# Patient Record
Sex: FEMALE | Race: BLACK OR AFRICAN AMERICAN | NOT HISPANIC OR LATINO | Employment: OTHER | URBAN - METROPOLITAN AREA
[De-identification: names, ages, dates, MRNs, and addresses within clinical notes are randomized per-mention and may not be internally consistent; named-entity substitution may affect disease eponyms.]

---

## 2017-01-03 ENCOUNTER — OFFICE VISIT (OUTPATIENT)
Dept: INTERNAL MEDICINE | Facility: CLINIC | Age: 67
End: 2017-01-03
Payer: MEDICARE

## 2017-01-03 VITALS
HEIGHT: 65 IN | TEMPERATURE: 98 F | WEIGHT: 206.44 LBS | SYSTOLIC BLOOD PRESSURE: 115 MMHG | DIASTOLIC BLOOD PRESSURE: 70 MMHG | HEART RATE: 92 BPM | OXYGEN SATURATION: 96 % | BODY MASS INDEX: 34.39 KG/M2

## 2017-01-03 DIAGNOSIS — M71.50 TRAUMATIC BURSITIS: Primary | ICD-10-CM

## 2017-01-03 PROBLEM — S88.119A: Status: ACTIVE | Noted: 2017-01-03

## 2017-01-03 PROCEDURE — 1125F AMNT PAIN NOTED PAIN PRSNT: CPT | Mod: S$GLB,,, | Performed by: FAMILY MEDICINE

## 2017-01-03 PROCEDURE — 3078F DIAST BP <80 MM HG: CPT | Mod: S$GLB,,, | Performed by: FAMILY MEDICINE

## 2017-01-03 PROCEDURE — 99999 PR PBB SHADOW E&M-EST. PATIENT-LVL III: CPT | Mod: PBBFAC,,, | Performed by: FAMILY MEDICINE

## 2017-01-03 PROCEDURE — 1157F ADVNC CARE PLAN IN RCRD: CPT | Mod: S$GLB,,, | Performed by: FAMILY MEDICINE

## 2017-01-03 PROCEDURE — 1159F MED LIST DOCD IN RCRD: CPT | Mod: S$GLB,,, | Performed by: FAMILY MEDICINE

## 2017-01-03 PROCEDURE — 20610 DRAIN/INJ JOINT/BURSA W/O US: CPT | Mod: S$GLB,,, | Performed by: FAMILY MEDICINE

## 2017-01-03 PROCEDURE — 1160F RVW MEDS BY RX/DR IN RCRD: CPT | Mod: S$GLB,,, | Performed by: FAMILY MEDICINE

## 2017-01-03 PROCEDURE — 99213 OFFICE O/P EST LOW 20 MIN: CPT | Mod: 25,S$GLB,, | Performed by: FAMILY MEDICINE

## 2017-01-03 PROCEDURE — 3074F SYST BP LT 130 MM HG: CPT | Mod: S$GLB,,, | Performed by: FAMILY MEDICINE

## 2017-01-03 RX ORDER — HYDROCODONE BITARTRATE AND ACETAMINOPHEN 7.5; 325 MG/1; MG/1
1 TABLET ORAL DAILY PRN
Qty: 15 TABLET | Refills: 0 | Status: SHIPPED | OUTPATIENT
Start: 2017-01-03 | End: 2017-01-18 | Stop reason: SDUPTHER

## 2017-01-03 NOTE — PROGRESS NOTES
Subjective:       Patient ID: Cassandra Campos is a 66 y.o. female.    Chief Complaint: Joint Swelling (right knee swollen)    HPI  Review of Systems    Objective:      Physical Exam    Assessment:       No diagnosis found.    Plan:       ***

## 2017-01-03 NOTE — PROGRESS NOTES
Subjective:       Patient ID: Cassandra Campos is a 66 y.o. female.    Chief Complaint: Joint Swelling (right knee swollen)    HPI Comments: 10 weeks ago she fell onto her right knee.  Emergency evaluation was done with x-rays of knee reported  negative.  She was reevaluated here 2 weeks later and  x-rays were done and found to be without fracture.  She continues with some discomfort as well as persistent swelling of the right anterior knee.  She denies any fever redness.  She is diabetic and reports her home fasting glucoses are usually 105    Review of Systems   Constitutional: Negative for fever.   Musculoskeletal:        Right knee pain and swelling       Objective:      Physical Exam   Musculoskeletal:   She is a mild to moderate amount of fluctuant swelling to right patellar region.  There is no deformity.  She has full range of motion of the knee.  There is no redness.       Assessment:       No diagnosis found.    Plan:     Betadine prep, 21 gauge needle  aspirated 7.5 cc of bloody fluid with no difficulty. bandaid was applied.   She will continue on Relafen daily.  Apply heat twice a day.  Increase walking.  She requests a refill hydrocodone 7.5 mg that she  used at the time of initial trauma.  Follow-up in 2 weeks if swelling recurs.

## 2017-01-10 ENCOUNTER — TELEPHONE (OUTPATIENT)
Dept: DIABETES | Facility: CLINIC | Age: 67
End: 2017-01-10

## 2017-01-10 ENCOUNTER — CLINICAL SUPPORT (OUTPATIENT)
Dept: DIABETES | Facility: CLINIC | Age: 67
End: 2017-01-10
Payer: MEDICARE

## 2017-01-10 ENCOUNTER — OFFICE VISIT (OUTPATIENT)
Dept: SURGERY | Facility: CLINIC | Age: 67
End: 2017-01-10
Payer: MEDICARE

## 2017-01-10 VITALS
BODY MASS INDEX: 36.72 KG/M2 | HEIGHT: 63 IN | DIASTOLIC BLOOD PRESSURE: 80 MMHG | SYSTOLIC BLOOD PRESSURE: 120 MMHG | WEIGHT: 207.25 LBS

## 2017-01-10 VITALS
SYSTOLIC BLOOD PRESSURE: 121 MMHG | BODY MASS INDEX: 36.9 KG/M2 | WEIGHT: 208.31 LBS | HEART RATE: 100 BPM | DIASTOLIC BLOOD PRESSURE: 76 MMHG

## 2017-01-10 DIAGNOSIS — E66.01 MORBID OBESITY DUE TO EXCESS CALORIES: Primary | ICD-10-CM

## 2017-01-10 DIAGNOSIS — E66.01 MORBID OBESITY DUE TO EXCESS CALORIES: ICD-10-CM

## 2017-01-10 PROCEDURE — 1160F RVW MEDS BY RX/DR IN RCRD: CPT | Mod: S$GLB,,, | Performed by: SURGERY

## 2017-01-10 PROCEDURE — 99499 UNLISTED E&M SERVICE: CPT | Mod: S$GLB,,, | Performed by: SURGERY

## 2017-01-10 PROCEDURE — 3078F DIAST BP <80 MM HG: CPT | Mod: S$GLB,,, | Performed by: SURGERY

## 2017-01-10 PROCEDURE — 99999 PR PBB SHADOW E&M-EST. PATIENT-LVL IV: CPT | Mod: PBBFAC,,, | Performed by: SURGERY

## 2017-01-10 PROCEDURE — 99999 PR PBB SHADOW E&M-EST. PATIENT-LVL III: CPT | Mod: PBBFAC,,, | Performed by: DIETITIAN, REGISTERED

## 2017-01-10 PROCEDURE — 1159F MED LIST DOCD IN RCRD: CPT | Mod: S$GLB,,, | Performed by: SURGERY

## 2017-01-10 PROCEDURE — 3074F SYST BP LT 130 MM HG: CPT | Mod: S$GLB,,, | Performed by: SURGERY

## 2017-01-10 PROCEDURE — 99214 OFFICE O/P EST MOD 30 MIN: CPT | Mod: S$GLB,,, | Performed by: SURGERY

## 2017-01-10 PROCEDURE — 97802 MEDICAL NUTRITION INDIV IN: CPT | Mod: S$GLB,,, | Performed by: DIETITIAN, REGISTERED

## 2017-01-10 PROCEDURE — 1157F ADVNC CARE PLAN IN RCRD: CPT | Mod: S$GLB,,, | Performed by: SURGERY

## 2017-01-10 PROCEDURE — 1126F AMNT PAIN NOTED NONE PRSNT: CPT | Mod: S$GLB,,, | Performed by: SURGERY

## 2017-01-10 RX ORDER — SODIUM CHLORIDE, SODIUM LACTATE, POTASSIUM CHLORIDE, CALCIUM CHLORIDE 600; 310; 30; 20 MG/100ML; MG/100ML; MG/100ML; MG/100ML
INJECTION, SOLUTION INTRAVENOUS CONTINUOUS
Status: CANCELLED | OUTPATIENT
Start: 2017-01-10

## 2017-01-10 RX ORDER — SODIUM CHLORIDE 9 MG/ML
INJECTION, SOLUTION INTRAVENOUS CONTINUOUS
Status: CANCELLED | OUTPATIENT
Start: 2017-01-10

## 2017-01-10 NOTE — TELEPHONE ENCOUNTER
----- Message from Ryanne Avila RD, CDE sent at 1/6/2017  7:58 AM CST -----  Please call pt. Okay for her to be seen on 1/10 but at 1130am since I'll be in class and we have a gestational coming at 1230p. priscila Mcallister

## 2017-01-10 NOTE — H&P
History & Physical    SUBJECTIVE:     History of Present Illness:  Patient is a 66 y.o. female presents with morbid obesity with BMI of 36.9 kg/m².  She did undergo complete and thorough evaluation including preoperative laboratory workups.  She also had dietary as well as psychosocial evaluation consultations.  She has been approved for bariatric surgery.  We have discussed the options and we plan to proceed with robotic-assisted laparoscopic sleeve gastrectomy.  Upon completion of her upper endoscopic examination, she is expected to proceed with the surgery unless there are contraindications.  She has past surgical history of laparoscopic cholecystectomy.    Chief Complaint   Patient presents with    Follow-up     Bariatrics       Review of patient's allergies indicates:  No Known Allergies    Current Outpatient Prescriptions   Medication Sig Dispense Refill    ACCU-CHEK FASTCLIX Misc USE ONE TIME DAILY 100 each 3    albuterol 90 mcg/actuation inhaler Inhale 2 puffs into the lungs every 6 (six) hours as needed for Wheezing. 1 Inhaler 11    amitriptyline (ELAVIL) 100 MG tablet TAKE 1 TABLET (100 MG TOTAL) BY MOUTH NIGHTLY. 90 tablet 0    aspirin (ECOTRIN) 81 MG EC tablet 81 mg once daily.       blood sugar diagnostic (ACCU-CHEK SMARTVIEW TEST STRIP) Strp Use to test once daily 100 strip 0    blood-glucose meter kit Use as instructed 1 each 0    blood-glucose meter Misc 1 each by Misc.(Non-Drug; Combo Route) route once daily. 1 each 0    eszopiclone (LUNESTA) 3 mg Tab Take 1 tablet (3 mg total) by mouth nightly as needed. 90 tablet 0    FOLIC ACID/MULTIVIT-MIN/LUTEIN (CENTRUM SILVER ORAL) Take by mouth.      hydrocodone-acetaminophen 7.5-325mg (NORCO) 7.5-325 mg per tablet Take 1 tablet by mouth daily as needed for Pain. 15 tablet 0    metformin (GLUCOPHAGE-XR) 500 MG 24 hr tablet 4 tablets with meal daily 360 tablet 1    metoprolol succinate (TOPROL-XL) 50 MG 24 hr tablet Take 1 tablet by mouth once  daily.      mupirocin (BACTROBAN) 2 % ointment       nabumetone (RELAFEN) 500 MG tablet Take 500 mg by mouth once daily.      omeprazole (PRILOSEC) 20 MG capsule Take 20 mg by mouth once daily.      pravastatin (PRAVACHOL) 40 MG tablet TAKE 1 TABLET ONE TIME DAILY 30 tablet 0     No current facility-administered medications for this visit.        Past Medical History   Diagnosis Date    Borderline glaucoma     COPD (chronic obstructive pulmonary disease)     Diabetes mellitus, type 2     Gastritis     Hyperlipidemia     Hypertension     Insomnia     Migraines 02/01/2000    Nasal septum perforation     SVT (supraventricular tachycardia) 09/2013    Type II or unspecified type diabetes mellitus without mention of complication, not stated as uncontrolled 05/20/2013     Past Surgical History   Procedure Laterality Date    Cholecystectomy  2/2014    Tonsillectomy, adenoidectomy      Carpal tunnel release       bilateral    Cataract extraction       OU    Axillary hidradenitis excision      Breast lumpectomy Bilateral 07/1998     Benign    Trigger finger release Right april 1, 2015     Dr. Pedraza    Cyst removal  july 2015     sebaceous cyst removed from face     Family History   Problem Relation Age of Onset    Prostate cancer Brother     Diabetes Maternal Aunt      Social History   Substance Use Topics    Smoking status: Former Smoker     Start date: 1/1/2012    Smokeless tobacco: Never Used    Alcohol use Yes      Comment: rarely // wine         Review of Systems:  Review of Systems   Constitutional: Positive for activity change and appetite change. Negative for chills, fever and unexpected weight change.   Respiratory: Negative.    Cardiovascular: Negative.    Gastrointestinal: Negative.    Endocrine: Negative.    Genitourinary: Negative.    Musculoskeletal: Negative.    Psychiatric/Behavioral: Negative.        OBJECTIVE:     Vital Signs (Most Recent)  Pulse: 100 (01/10/17 1256)  BP: 121/76  (01/10/17 1256)     94.5 kg (208 lb 5.4 oz)     Physical Exam:  Physical Exam   Constitutional: She appears well-developed and well-nourished.   Morbidly obese   HENT:   Head: Normocephalic.   Eyes: Pupils are equal, round, and reactive to light.   Neck: Normal range of motion. Neck supple.   Cardiovascular: Normal rate, regular rhythm, normal heart sounds and intact distal pulses.    Pulmonary/Chest: Effort normal and breath sounds normal.   Abdominal: Soft. Bowel sounds are normal.   Morbidly obese abdomen with well-healed scars from previous gallbladder surgery.   Musculoskeletal: Normal range of motion.   Skin: Skin is warm.   Psychiatric: She has a normal mood and affect.   Vitals reviewed.      Laboratory  Lab Results   Component Value Date    WBC 8.24 11/28/2016    HGB 14.5 11/28/2016    HCT 42.2 11/28/2016     11/28/2016    CHOL 170 11/28/2016    TRIG 208 (H) 11/28/2016    HDL 30 (L) 11/28/2016    ALT 45 (H) 11/28/2016    AST 30 11/28/2016     11/28/2016    K 4.3 11/28/2016     11/28/2016    CREATININE 0.8 11/28/2016    BUN 14 11/28/2016    CO2 25 11/28/2016    TSH 2.386 11/28/2016    HGBA1C 7.4 (H) 11/28/2016       No results found for this or any previous visit.      Diagnostic Results:  Labs: Reviewed  ECG: Reviewed  X-Ray: Reviewed    None    ASSESSMENT/PLAN:     Morbid obesity with BMI of 36.90 kg/m².    PLAN:Plan     Robotic-assisted laparoscopic sleeve gastrectomy.  The risk and the benefit of the surgery was fully discussed with the patient.  Patient will initially undergo upper endoscopic examination on February 8 of 2017.  The surgery will follow on February 13 of 2017 at 10:30 AM.    Rashaad Watson

## 2017-01-10 NOTE — PROGRESS NOTES
PCP: Denia Maldonado MD  REFERRING PROVIDER:  Rashaad Watson MD      HISTORY OF PRESENT ILLNESS:  66 y.o. female patient is in clinic today for bariatric surgery assessment. Patient has 0 month(s) of MSD. Patient has history of diabetes and currently takes metformin 500mg 2 tabs twice daily for diabetes.     Hemoglobin A1C   Date Value Ref Range Status   11/28/2016 7.4 (H) 4.5 - 6.2 % Final     Comment:     According to ADA guidelines, hemoglobin A1C <7.0% represents  optimal control in non-pregnant diabetic patients.  Different  metrics may apply to specific populations.   Standards of Medical Care in Diabetes - 2016.  For the purpose of screening for the presence of diabetes:  <5.7%     Consistent with the absence of diabetes  5.7-6.4%  Consistent with increasing risk for diabetes   (prediabetes)  >or=6.5%  Consistent with diabetes  Currently no consensus exists for use of hemoglobin A1C  for diagnosis of diabetes for children.     , ADA recommends less than 7.0.     Per recall, 2 hrs after meal BG . Weight difficulties for entire life.  Weight loss strategies include nutrisystem, weight watcher with most lost (regained) 10.    LABORATORY:  A1C:   Hemoglobin A1C   Date Value Ref Range Status   11/28/2016 7.4 (H) 4.5 - 6.2 % Final     Comment:     According to ADA guidelines, hemoglobin A1C <7.0% represents  optimal control in non-pregnant diabetic patients.  Different  metrics may apply to specific populations.   Standards of Medical Care in Diabetes - 2016.  For the purpose of screening for the presence of diabetes:  <5.7%     Consistent with the absence of diabetes  5.7-6.4%  Consistent with increasing risk for diabetes   (prediabetes)  >or=6.5%  Consistent with diabetes  Currently no consensus exists for use of hemoglobin A1C  for diagnosis of diabetes for children.       Chol:   Cholesterol   Date Value Ref Range Status   11/28/2016 170 120 - 199 mg/dL Final     Comment:     The National Cholesterol  Education Program (NCEP) has set the  following guidelines (reference ranges) for Cholesterol:  Optimal.....................<200 mg/dL  Borderline High.............200-239 mg/dL  High........................> or = 240 mg/dL       Trig:   Triglycerides   Date Value Ref Range Status   11/28/2016 208 (H) 30 - 150 mg/dL Final     Comment:     The National Cholesterol Education Program (NCEP) has set the  following guidelines (reference values) for triglycerides:  Normal......................<150 mg/dL  Borderline High.............150-199 mg/dL  High........................200-499 mg/dL       HDL:   HDL   Date Value Ref Range Status   11/28/2016 30 (L) 40 - 75 mg/dL Final     Comment:     The National Cholesterol Education Program (NCEP) has set the  following guidelines (reference values) for HDL Cholesterol:  Low...............<40 mg/dL  Optimal...........>60 mg/dL       LDL: No components found for: LDL   Micro/Cr Ratio:    Creatinine   Date Value Ref Range Status   11/28/2016 0.8 0.5 - 1.4 mg/dL Final     Protein/Cr Ratio: No components found for: PROTEIN    HEALTH MAINTENANCE: Reviewed and Reconciled  Eye exam:  7/16   Dental exam: Recommend regular exams; denies gums bleeding.  Podiatry exam:  5/16     SELF-MONITORING: Glucometer - accuchek; Food - none are avail    ACTIVITY LEVEL: Walk 2 hrs daily     NUTRITION INTAKE: Meal patterns include 3 meals, 1-2 snacks daily with intake 800-1200 cals/d. Patient is limiting carbohydrates, saturated fat or sodium. Adequate intakes of fruits, vegetables, whole grains.  B - skips - water  L - grilled chix 2.5 strips, cabbage, strawberries OR turkey on wheat w/ baked chips - diet cola   S - none OR fruit  D - gumbo, salad (vinegarette) OR healthy choice cafe steamers - water, crystal light (pt using portion containers)  S - fruit  Beverages - diet cola, water, crystal light  DIning out - rare    PSYCHOSOCIAL: Stage of change - action  Barriers to change - none  Motivation to  change (10high): 10  Predicted compliance (10high): 10  Realistic expectation for wt loss (10high): 10  Verbalizes understanding of dietary changes post procedure and willingness to participate in physical activity (10high): 10    MNT ASSESSMENT:   800-1200 calories, 80 ounces protein daily  15-30 grams carb/meal, 5-15 grams carb/snack  increase fruit 2 serv/d, vegetables 2+ cups/d, whole grains 3+serv/d, lowfat dairy 3+serv/d  low-fat, low-sodium  150 min physical activity per week, moderate intensity, as tolerated    PLAN:  · Reviewed pathophysiology diabetes, complications and importance of annual dilated eye exams, regular dental exams, and daily foot self-exams.   · Encouraged daily self-monitoring of glucose, food and activity patterns, return records to clinic.   · Reviewed glucose goals. Discussed prevention, identification and treatment of hyperglycemia and hypoglycemia and when to contact clinic. Instructed to test glucose fasting, before supper or at bedtime.  · Reviewed action of diabetes medications and to continue taking as prescribed.  · Provided pre & post surgery meal-planning instruction via bariatric diet manual, foodlists, plate method, food models, food labels and/or ADA booklet. Reviewed micro/macronutrient effect on glucose and weight management. Discussed carbohydrate counting and spacing techniques with emphasis on supplementation necessary for altered metabolism. Reviewed principles of energy metabolism to assist weight and health management.                                                         · Discussed physical activity with review of benefits, methods, precautions.  · Discussed bx strategies for improving, strategies for improving social & environmental support of lifestyle changes.  · Discussed role of stress on diabetes management. Reviewed stress management techniques and healthy coping strategies.  · Reviewed risks of smoking and encouraged continued smoking cessation.    · Reviewed ADA goals, progress. Will update ADA labs per protocol.     GOALS: Daily glucose, food & activity journal. Meal plan-90% accuracy. Activity-150 minutes per week.   Visit Time Spent:  60 minutes    Thank you for the opportunity to work with your patient.

## 2017-01-10 NOTE — MR AVS SNAPSHOT
[facility]  [address] [address]  Phone: [phone]  Fax: [phone]                  Cassandra Campos   1/10/2017 1:00 PM   Office Visit    Description:  Female : 1950   Provider:  Rashaad Watson MD   Department:  [facility]           Reason for Visit     Follow-up           Diagnoses this Visit        Comments    Morbid obesity due to excess calories    -  Primary            To Do List           Future Appointments        Provider Department Dept Phone    2017 1:45 PM Jayro Pedraza Sr., MD [facility] [phone]    2017 11:10 AM [facility], [facility] [facility] [phone]    3/7/2017 1:00 PM Tsering Bazzi PA-C [facility] [phone]    2017 9:40 AM Denia Maldonado MD [facility] [phone]      Your Future Surgeries/Procedures     2017   Surgery with Rashaad Watson MD   [facility] ([facility])    [id number] [place] [address] [phone]            2017   Surgery with Rashaad Watson MD   [facility] ([facility])    [address] [place] [address] [phone]              Goals (5 Years of Data)     None      [facility]     [facility] Care [facility] -  Assistance  Registered nurses in the [facility] provide clinical advisement, health education, appointment booking, and other advisory services.  Call for this free service at [phone].             Medications           Message regarding Medications     Verify the changes and/or additions to your medication regime listed below are the same as discussed with your clinician today.  If any of these changes or additions are incorrect, please notify your healthcare provider.             Verify that the below list of medications is an accurate representation of the medications you are  currently taking.  If none reported, the list may be blank. If incorrect, please contact your healthcare provider. Carry this list with you in case of emergency.           Current Medications     ACCU-CHEK FASTCLIX Misc USE ONE TIME DAILY    albuterol 90 mcg/actuation inhaler Inhale 2 puffs into the lungs every 6 (six) hours as needed for Wheezing.    amitriptyline (ELAVIL) 100 MG tablet TAKE 1 TABLET (100 MG TOTAL) BY MOUTH NIGHTLY.    aspirin (ECOTRIN) 81 MG EC tablet 81 mg once daily.     blood sugar diagnostic (ACCU-CHEK SMARTVIEW TEST STRIP) Strp Use to test once daily    blood-glucose meter kit Use as instructed    blood-glucose meter Misc 1 each by Misc.(Non-Drug; Combo Route) route once daily.    eszopiclone (LUNESTA) 3 mg Tab Take 1 tablet (3 mg total) by mouth nightly as needed.    FOLIC ACID/MULTIVIT-MIN/LUTEIN (CENTRUM SILVER ORAL) Take by mouth.    hydrocodone-acetaminophen 7.5-325mg (NORCO) 7.5-325 mg per tablet Take 1 tablet by mouth daily as needed for Pain.    metformin (GLUCOPHAGE-XR) 500 MG 24 hr tablet 4 tablets with meal daily    metoprolol succinate (TOPROL-XL) 50 MG 24 hr tablet Take 1 tablet by mouth once daily.    mupirocin (BACTROBAN) 2 % ointment     nabumetone (RELAFEN) 500 MG tablet Take 500 mg by mouth once daily.    omeprazole (PRILOSEC) 20 MG capsule Take 20 mg by mouth once daily.    pravastatin (PRAVACHOL) 40 MG tablet TAKE 1 TABLET ONE TIME DAILY           Clinical Reference Information           Vital Signs - Last Recorded  Most recent update: 1/10/2017 12:57 PM by Hetal Burks LPN    BP Pulse Wt BMI       121/76 (BP Location: Right arm, Patient Position: Sitting) 100 94.5 kg (208 lb 5.4 oz) 36.9 kg/m2       Blood Pressure          Most Recent Value    BP  121/76      Allergies as of 1/10/2017     No Known Allergies      Immunizations Administered on Date of Encounter - 1/10/2017     None      Orders Placed During Today's Visit      Normal Orders This Visit    Case request  GI: ESOPHAGOGASTRODUODENOSCOPY (EGD)     Case Request Operating Room: XI ROBOT ASSISTED LAPAROSCOPIC SLEEVE-GASTRECTOMY     Medium Risk of VTE     Future Labs/Procedures Expected by Expires    CBC auto differential  1/30/2017 3/11/2018    Comprehensive metabolic panel  1/30/2017 3/11/2018

## 2017-01-10 NOTE — MR AVS SNAPSHOT
[facility]  [address] [facility] [address] [place] [address]  Phone: [phone]  Fax: [phone]                  Cassandra Campos   1/10/2017 11:00 AM   Clinical Support    Description:  Female : 1950   Provider:  Ryanne Avila RD, CDE   Department:  [facility]           Reason for Visit     Diabetes     Nutrition Counseling           Diagnoses this Visit        Comments    Uncontrolled type 2 diabetes mellitus with complication, without long-term current use of insulin    -  Primary     Morbid obesity due to excess calories                To Do List           Future Appointments        Provider Department Dept Phone    1/10/2017 1:00 PM Rashaad Watson MD [facility] General Surgery [phone]    2017 1:45 PM Jayro Pedraza Sr., MD [facility] Orthopedics [phone]    2017 9:40 AM Denia Maldonado MD [facility] Care [phone]      Goals (5 Years of Data)     None      Follow-Up and Disposition     Return surgery dept will coord appt 1 month after surgery .      [facility]     [facility] On Call Nurse [facility] -  Assistance  Registered nurses in the [facility] On Call Center provide clinical advisement, health education, appointment booking, and other advisory services.  Call for this free service at [phone].             Medications           Message regarding Medications     Verify the changes and/or additions to your medication regime listed below are the same as discussed with your clinician today.  If any of these changes or additions are incorrect, please notify your healthcare provider.             Verify that the below list of medications is an accurate representation of the medications you are currently taking.  If none reported, the list may be blank. If incorrect, please contact your healthcare provider. Carry this list with you in case of emergency.           Current Medications     ACCU-CHEK FASTCLIX Misc USE ONE TIME DAILY     "albuterol 90 mcg/actuation inhaler Inhale 2 puffs into the lungs every 6 (six) hours as needed for Wheezing.    amitriptyline (ELAVIL) 100 MG tablet TAKE 1 TABLET (100 MG TOTAL) BY MOUTH NIGHTLY.    aspirin (ECOTRIN) 81 MG EC tablet 81 mg once daily.     blood sugar diagnostic (ACCU-CHEK SMARTVIEW TEST STRIP) Strp Use to test once daily    blood-glucose meter kit Use as instructed    blood-glucose meter Misc 1 each by Misc.(Non-Drug; Combo Route) route once daily.    eszopiclone (LUNESTA) 3 mg Tab Take 1 tablet (3 mg total) by mouth nightly as needed.    FOLIC ACID/MULTIVIT-MIN/LUTEIN (CENTRUM SILVER ORAL) Take by mouth.    hydrocodone-acetaminophen 7.5-325mg (NORCO) 7.5-325 mg per tablet Take 1 tablet by mouth daily as needed for Pain.    metformin (GLUCOPHAGE-XR) 500 MG 24 hr tablet 4 tablets with meal daily    metoprolol succinate (TOPROL-XL) 50 MG 24 hr tablet Take 1 tablet by mouth once daily.    mupirocin (BACTROBAN) 2 % ointment     nabumetone (RELAFEN) 500 MG tablet Take 500 mg by mouth once daily.    omeprazole (PRILOSEC) 20 MG capsule Take 20 mg by mouth once daily.    pravastatin (PRAVACHOL) 40 MG tablet TAKE 1 TABLET ONE TIME DAILY           Clinical Reference Information           Vital Signs - Last Recorded  Most recent update: 1/10/2017 11:18 AM by Melissa Nance MA    BP Ht Wt BMI       120/80 5' 3" (1.6 m) 94 kg (207 lb 3.7 oz) 36.71 kg/m2       Blood Pressure          Most Recent Value    BP  120/80      Allergies as of 1/10/2017     No Known Allergies      Immunizations Administered on Date of Encounter - 1/10/2017     None      "

## 2017-01-17 ENCOUNTER — PATIENT MESSAGE (OUTPATIENT)
Dept: CARDIOLOGY | Facility: CLINIC | Age: 67
End: 2017-01-17

## 2017-01-17 DIAGNOSIS — I15.9 SECONDARY HYPERTENSION: Primary | ICD-10-CM

## 2017-01-18 ENCOUNTER — OFFICE VISIT (OUTPATIENT)
Dept: INTERNAL MEDICINE | Facility: CLINIC | Age: 67
End: 2017-01-18
Payer: MEDICARE

## 2017-01-18 VITALS
SYSTOLIC BLOOD PRESSURE: 140 MMHG | WEIGHT: 204.5 LBS | BODY MASS INDEX: 36.22 KG/M2 | TEMPERATURE: 93 F | DIASTOLIC BLOOD PRESSURE: 88 MMHG | HEART RATE: 96 BPM | OXYGEN SATURATION: 94 %

## 2017-01-18 DIAGNOSIS — I47.10 PAROXYSMAL SVT (SUPRAVENTRICULAR TACHYCARDIA): ICD-10-CM

## 2017-01-18 DIAGNOSIS — T14.90XA TRAUMA: Primary | ICD-10-CM

## 2017-01-18 DIAGNOSIS — E11.9 CONTROLLED TYPE 2 DIABETES MELLITUS WITHOUT COMPLICATION, WITHOUT LONG-TERM CURRENT USE OF INSULIN: ICD-10-CM

## 2017-01-18 DIAGNOSIS — G47.00 INSOMNIA, UNSPECIFIED TYPE: ICD-10-CM

## 2017-01-18 PROCEDURE — 1125F AMNT PAIN NOTED PAIN PRSNT: CPT | Mod: S$GLB,,, | Performed by: FAMILY MEDICINE

## 2017-01-18 PROCEDURE — 3045F PR MOST RECENT HEMOGLOBIN A1C LEVEL 7.0-9.0%: CPT | Mod: S$GLB,,, | Performed by: FAMILY MEDICINE

## 2017-01-18 PROCEDURE — 99999 PR PBB SHADOW E&M-EST. PATIENT-LVL III: CPT | Mod: PBBFAC,,, | Performed by: FAMILY MEDICINE

## 2017-01-18 PROCEDURE — 1160F RVW MEDS BY RX/DR IN RCRD: CPT | Mod: S$GLB,,, | Performed by: FAMILY MEDICINE

## 2017-01-18 PROCEDURE — 99213 OFFICE O/P EST LOW 20 MIN: CPT | Mod: 25,S$GLB,, | Performed by: FAMILY MEDICINE

## 2017-01-18 PROCEDURE — 3060F POS MICROALBUMINURIA REV: CPT | Mod: S$GLB,,, | Performed by: FAMILY MEDICINE

## 2017-01-18 PROCEDURE — 2022F DILAT RTA XM EVC RTNOPTHY: CPT | Mod: S$GLB,,, | Performed by: FAMILY MEDICINE

## 2017-01-18 PROCEDURE — 1159F MED LIST DOCD IN RCRD: CPT | Mod: S$GLB,,, | Performed by: FAMILY MEDICINE

## 2017-01-18 PROCEDURE — 99499 UNLISTED E&M SERVICE: CPT | Mod: S$GLB,,, | Performed by: FAMILY MEDICINE

## 2017-01-18 PROCEDURE — 3079F DIAST BP 80-89 MM HG: CPT | Mod: S$GLB,,, | Performed by: FAMILY MEDICINE

## 2017-01-18 PROCEDURE — 3077F SYST BP >= 140 MM HG: CPT | Mod: S$GLB,,, | Performed by: FAMILY MEDICINE

## 2017-01-18 PROCEDURE — 1157F ADVNC CARE PLAN IN RCRD: CPT | Mod: S$GLB,,, | Performed by: FAMILY MEDICINE

## 2017-01-18 PROCEDURE — 20605 DRAIN/INJ JOINT/BURSA W/O US: CPT | Mod: S$GLB,,, | Performed by: FAMILY MEDICINE

## 2017-01-18 RX ORDER — METOPROLOL SUCCINATE 50 MG/1
50 TABLET, EXTENDED RELEASE ORAL DAILY
Qty: 90 TABLET | Refills: 1 | Status: SHIPPED | OUTPATIENT
Start: 2017-01-18 | End: 2017-04-10 | Stop reason: SDUPTHER

## 2017-01-18 RX ORDER — ESZOPICLONE 3 MG/1
3 TABLET, FILM COATED ORAL NIGHTLY PRN
Qty: 90 TABLET | Refills: 0 | Status: SHIPPED | OUTPATIENT
Start: 2017-01-18 | End: 2017-04-21 | Stop reason: SDUPTHER

## 2017-01-18 RX ORDER — HYDROCODONE BITARTRATE AND ACETAMINOPHEN 7.5; 325 MG/1; MG/1
1 TABLET ORAL DAILY PRN
Qty: 15 TABLET | Refills: 0 | Status: SHIPPED | OUTPATIENT
Start: 2017-01-18 | End: 2017-02-17 | Stop reason: ALTCHOICE

## 2017-01-18 RX ORDER — OMEPRAZOLE 20 MG/1
20 CAPSULE, DELAYED RELEASE ORAL DAILY
Qty: 90 CAPSULE | Refills: 1 | Status: SHIPPED | OUTPATIENT
Start: 2017-01-18 | End: 2017-11-27 | Stop reason: SDUPTHER

## 2017-01-18 NOTE — PROGRESS NOTES
Subjective:       Patient ID: Cassandra Campos is a 66 y.o. female.    Chief Complaint: Medication Refill and right knee swollen    HPI Comments: Prepatellar swelling has returned.  Past medical history includes diabetes, paroxysmal SVT, insomnia, esophageal reflux.  Insomnia and reflux controlled with lunesta  and Prilosec.  Cardiology is following SVT.    Medication Refill       Review of Systems   Musculoskeletal:        Right knee swelling       Objective:      Physical Exam   Musculoskeletal:   Prepatellar area right knee with fluctuant swelling.  No redness tenderness or increased heat       Assessment:       1. Trauma    2. Insomnia, unspecified type    3. Controlled type 2 diabetes mellitus without complication, without long-term current use of insulin    4. Paroxysmal SVT (supraventricular tachycardia)        Plan:       Betadine dressing, 10 cc of nonclotting dark blood  was aspirated with no difficulty from the rt prepatella .  4 x 4 dressing and compression Ace wrap was applied.  Return in one week.  Apply Ace wrap and daily when bathing and then reapplied.  Restricted activity to walking and  discontinue treadmill for 1 week.

## 2017-01-18 NOTE — MR AVS SNAPSHOT
[facility]  [address] [address]  Phone: [phone]  Fax: [phone]                  Cassandra Campos   2017 11:30 AM   Office Visit    Description:  Female : 1950   Provider:  Emil Zhang MD   Department:  [facility]           Reason for Visit     Medication Refill     right knee swollen           Diagnoses this Visit        Comments    Trauma    -  Primary     Insomnia, unspecified type         Controlled type 2 diabetes mellitus without complication, without long-term current use of insulin         Paroxysmal SVT (supraventricular tachycardia)                To Do List           Future Appointments        Provider Department Dept Phone    2017 11:30 AM Emil Zhang MD [facility] [phone]    2017 3:30 PM [facility], [facility] [phone]    2017 4:45 PM Jayro Pedraza Sr., MD [facility] [phone]    3/7/2017 1:00 PM Tsering Bazzi PA-C [facility] General Surgery [phone]    2017 9:40 AM Denia Maldonado MD [facility] [phone]      Your Future Surgeries/Procedures     2017   Surgery with Rashaad Watson MD   [facility]    [address] [place] [id number] [phone]            2017   Surgery with Rashaad Watson MD   [facility]    [address] [place] [id number] [phone]              Goals (5 Years of Data)     None      Follow-Up and Disposition     Return in about 1 week (around 2017).       These Medications        Disp Refills Start End    hydrocodone-acetaminophen 7.5-325mg (NORCO) 7.5-325 mg per tablet 15 tablet 0 2017     Take 1 tablet by mouth daily as needed for Pain. - Oral    Pharmacy: [id number] - [place] - [address] AT [facility] Ph #:  [phone]       eszopiclone (LUNESTA) 3 mg Tab 90 tablet 0 1/18/2017     Take 1 tablet (3 mg total) by mouth nightly as needed. - Oral    Pharmacy: [facility] [address] [facility] #: [phone]       Notes to Pharmacy: This replaces the 90 day rx sent to local [facility] in error    omeprazole (PRILOSEC) 20 MG capsule 90 capsule 1 1/18/2017     Take 1 capsule (20 mg total) by mouth once daily. - Oral    Pharmacy: [facility] - [place], [place] - [address] [facility] Ph #: [phone]         [facility]     [facility] - 24/7 Assistance  Registered nurses in the [facility] Center provide clinical advisement, health education, appointment booking, and other advisory services.  Call for this free service at [phone].             Medications           Message regarding Medications     Verify the changes and/or additions to your medication regime listed below are the same as discussed with your clinician today.  If any of these changes or additions are incorrect, please notify your healthcare provider.        CHANGE how you are taking these medications     Start Taking Instead of    omeprazole (PRILOSEC) 20 MG capsule omeprazole (PRILOSEC) 20 MG capsule    Dosage:  Take 1 capsule (20 mg total) by mouth once daily. Dosage:  Take 20 mg by mouth once daily.    Reason for Change:  Reorder            Verify that the below list of medications is an accurate representation of the medications you are currently taking.  If none reported, the list may be blank. If incorrect, please contact your healthcare provider. Carry this list with you in case of emergency.           Current Medications     ACCU-CHEK FASTCLIX Misc USE ONE TIME DAILY    amitriptyline (ELAVIL) 100 MG tablet TAKE 1 TABLET (100 MG TOTAL) BY MOUTH NIGHTLY.    aspirin (ECOTRIN) 81 MG EC tablet 81 mg once daily.     blood sugar diagnostic (ACCU-CHEK SMARTVIEW TEST STRIP) Strp Use  to test once daily    blood-glucose meter kit Use as instructed    blood-glucose meter Misc 1 each by Misc.(Non-Drug; Combo Route) route once daily.    eszopiclone (LUNESTA) 3 mg Tab Take 1 tablet (3 mg total) by mouth nightly as needed.    FOLIC ACID/MULTIVIT-MIN/LUTEIN (CENTRUM SILVER ORAL) Take by mouth.    hydrocodone-acetaminophen 7.5-325mg (NORCO) 7.5-325 mg per tablet Take 1 tablet by mouth daily as needed for Pain.    metformin (GLUCOPHAGE-XR) 500 MG 24 hr tablet 4 tablets with meal daily    metoprolol succinate (TOPROL-XL) 50 MG 24 hr tablet Take 1 tablet (50 mg total) by mouth once daily.    mupirocin (BACTROBAN) 2 % ointment     nabumetone (RELAFEN) 500 MG tablet Take 500 mg by mouth once daily.    omeprazole (PRILOSEC) 20 MG capsule Take 1 capsule (20 mg total) by mouth once daily.    pravastatin (PRAVACHOL) 40 MG tablet TAKE 1 TABLET ONE TIME DAILY           Clinical Reference Information           Vital Signs - Last Recorded  Most recent update: 1/18/2017 11:13 AM by April Rhodes MA    BP Pulse Temp    (!) 140/88 (BP Location: Right arm, Patient Position: Sitting, BP Method: Automatic) 96 (!) 92.7 °F (33.7 °C) (Tympanic)    Wt SpO2 BMI    92.8 kg (204 lb 7.6 oz) (!) 94% 36.22 kg/m2      Blood Pressure          Most Recent Value    BP  (!)  140/88      Allergies as of 1/18/2017     No Known Allergies      Immunizations Administered on Date of Encounter - 1/18/2017     None

## 2017-01-23 ENCOUNTER — OFFICE VISIT (OUTPATIENT)
Dept: INTERNAL MEDICINE | Facility: CLINIC | Age: 67
End: 2017-01-23
Payer: MEDICARE

## 2017-01-23 VITALS
OXYGEN SATURATION: 94 % | SYSTOLIC BLOOD PRESSURE: 118 MMHG | WEIGHT: 209 LBS | TEMPERATURE: 97 F | HEART RATE: 100 BPM | DIASTOLIC BLOOD PRESSURE: 78 MMHG | BODY MASS INDEX: 37.02 KG/M2

## 2017-01-23 DIAGNOSIS — M70.51 PATELLAR BURSITIS OF RIGHT KNEE: Primary | ICD-10-CM

## 2017-01-23 PROCEDURE — 1126F AMNT PAIN NOTED NONE PRSNT: CPT | Mod: S$GLB,,, | Performed by: FAMILY MEDICINE

## 2017-01-23 PROCEDURE — 3078F DIAST BP <80 MM HG: CPT | Mod: S$GLB,,, | Performed by: FAMILY MEDICINE

## 2017-01-23 PROCEDURE — 1159F MED LIST DOCD IN RCRD: CPT | Mod: S$GLB,,, | Performed by: FAMILY MEDICINE

## 2017-01-23 PROCEDURE — 3074F SYST BP LT 130 MM HG: CPT | Mod: S$GLB,,, | Performed by: FAMILY MEDICINE

## 2017-01-23 PROCEDURE — 99999 PR PBB SHADOW E&M-EST. PATIENT-LVL IV: CPT | Mod: PBBFAC,,, | Performed by: FAMILY MEDICINE

## 2017-01-23 PROCEDURE — 99212 OFFICE O/P EST SF 10 MIN: CPT | Mod: S$GLB,,, | Performed by: FAMILY MEDICINE

## 2017-01-23 PROCEDURE — 1160F RVW MEDS BY RX/DR IN RCRD: CPT | Mod: S$GLB,,, | Performed by: FAMILY MEDICINE

## 2017-01-23 PROCEDURE — 1157F ADVNC CARE PLAN IN RCRD: CPT | Mod: S$GLB,,, | Performed by: FAMILY MEDICINE

## 2017-01-23 NOTE — PATIENT INSTRUCTIONS
OK to continue the nabumetone (Relafen) once daily as currently taking.  What is bursitis?  A bursa is a fluid-filled sac that helps cushion the muscles, tendons, and bones around a joint. When a bursa becomes inflamed, its called bursitis. Common symptoms of bursitis include pain, tenderness, and swelling that limits movement of the joint.  What causes bursitis?  Bursitis is most often caused by overuse of a joint. The repeated movements irritate the bursa and may cause it to swell. When that happens, other surrounding tissues may become inflamed or have less space to move. Bursitis is most common in large joints such as the knee, shoulder, and hip.       Nonsurgical treatment involves both rest and exercise.    How is bursitis treated?  To help reduce pain and swelling, your healthcare provider may recommend one or more of the following:   · Rest gives the bursa time to heal. This means limiting activities that put stress on the joint.  · Anti-inflammatory medications help reduce painful swelling. In some cases, this can include injections of cortisone or other steroid medicines into the bursa.  · Splints and support bandages improve your comfort and allow the bursa to heal.  · Physical therapy may be used to increase flexibility and strengthen muscles that support the joint.  · Aspiration removes extra fluid from the bursa using a needle. This can help your healthcare provider find out what is causing your bursitis. For example, it might be an infection or overuse.   · Surgery can be used to remove an inflamed or infected bursa. This is rarely needed.  © 2984-0476 [employer]. [address] [place] [address], [place] [address]. All rights reserved. This information is not intended as a substitute for professional medical care. Always follow your healthcare professional's instructions.

## 2017-01-23 NOTE — PROGRESS NOTES
Subjective:       Patient ID: Cassandra Campos is a 66 y.o. female.    Chief Complaint: Joint Swelling    HPI Comments: She is here with continued right knee prepatellar traumatic bursitis, s/p aspiration of bloody fluid  1/3/17 and again 1/18/17. The initial aspiration seemed to improve the swelling for a while. The most recent aspiration did not produce any lasting reduction in her swelling. She has not been using any kind of wrap to the area.  Taking relafen once daily with food.     Review of Systems   Constitutional: Negative for fever.   Musculoskeletal: Positive for gait problem and joint swelling.   Psychiatric/Behavioral: Negative for sleep disturbance.       Objective:      Physical Exam   Constitutional: She is oriented to person, place, and time. She appears well-developed.   HENT:   Head: Normocephalic and atraumatic.   Cardiovascular: Normal rate, regular rhythm and normal heart sounds.    Pulmonary/Chest: Effort normal and breath sounds normal.   Musculoskeletal:   Swelling overlying right patella without warmth, erythema, or TTP. Knee otherwise without swelling.   Neurological: She is alert and oriented to person, place, and time.   Skin: Skin is warm and dry.   Psychiatric: She has a normal mood and affect. Her behavior is normal.       Assessment:       1. Patellar bursitis of right knee        Plan:     1. Patellar bursitis of right knee  Ambulatory referral to Orthopedics   continue nabumetone one daily. Refer to ortho for definitive care.

## 2017-01-24 PROBLEM — S88.119A: Status: RESOLVED | Noted: 2017-01-03 | Resolved: 2017-01-24

## 2017-01-27 ENCOUNTER — LAB VISIT (OUTPATIENT)
Dept: LAB | Facility: HOSPITAL | Age: 67
End: 2017-01-27
Attending: SURGERY
Payer: MEDICARE

## 2017-01-27 DIAGNOSIS — E66.01 MORBID OBESITY DUE TO EXCESS CALORIES: ICD-10-CM

## 2017-01-27 LAB
ALBUMIN SERPL BCP-MCNC: 3.7 G/DL
ALP SERPL-CCNC: 111 U/L
ALT SERPL W/O P-5'-P-CCNC: 40 U/L
ANION GAP SERPL CALC-SCNC: 8 MMOL/L
AST SERPL-CCNC: 24 U/L
BASOPHILS # BLD AUTO: 0.04 K/UL
BASOPHILS NFR BLD: 0.5 %
BILIRUB SERPL-MCNC: 0.2 MG/DL
BUN SERPL-MCNC: 13 MG/DL
CALCIUM SERPL-MCNC: 9.9 MG/DL
CHLORIDE SERPL-SCNC: 105 MMOL/L
CO2 SERPL-SCNC: 26 MMOL/L
CREAT SERPL-MCNC: 0.8 MG/DL
DIFFERENTIAL METHOD: ABNORMAL
EOSINOPHIL # BLD AUTO: 0.2 K/UL
EOSINOPHIL NFR BLD: 2.3 %
ERYTHROCYTE [DISTWIDTH] IN BLOOD BY AUTOMATED COUNT: 15.2 %
EST. GFR  (AFRICAN AMERICAN): >60 ML/MIN/1.73 M^2
EST. GFR  (NON AFRICAN AMERICAN): >60 ML/MIN/1.73 M^2
GLUCOSE SERPL-MCNC: 120 MG/DL
HCT VFR BLD AUTO: 38.4 %
HGB BLD-MCNC: 12.9 G/DL
LYMPHOCYTES # BLD AUTO: 4.3 K/UL
LYMPHOCYTES NFR BLD: 50.7 %
MCH RBC QN AUTO: 24.8 PG
MCHC RBC AUTO-ENTMCNC: 33.6 %
MCV RBC AUTO: 74 FL
MONOCYTES # BLD AUTO: 0.7 K/UL
MONOCYTES NFR BLD: 8.3 %
NEUTROPHILS # BLD AUTO: 3.2 K/UL
NEUTROPHILS NFR BLD: 37.8 %
PLATELET # BLD AUTO: 275 K/UL
PMV BLD AUTO: 10.1 FL
POTASSIUM SERPL-SCNC: 4.2 MMOL/L
PROT SERPL-MCNC: 8.3 G/DL
RBC # BLD AUTO: 5.2 M/UL
SODIUM SERPL-SCNC: 139 MMOL/L
WBC # BLD AUTO: 8.44 K/UL

## 2017-01-27 PROCEDURE — 36415 COLL VENOUS BLD VENIPUNCTURE: CPT | Mod: PO

## 2017-01-27 PROCEDURE — 80053 COMPREHEN METABOLIC PANEL: CPT

## 2017-01-27 PROCEDURE — 85025 COMPLETE CBC W/AUTO DIFF WBC: CPT

## 2017-01-31 ENCOUNTER — PATIENT MESSAGE (OUTPATIENT)
Dept: DIABETES | Facility: CLINIC | Age: 67
End: 2017-01-31

## 2017-02-01 RX ORDER — NABUMETONE 500 MG/1
TABLET, FILM COATED ORAL
Qty: 90 TABLET | Refills: 2 | OUTPATIENT
Start: 2017-02-01

## 2017-02-07 ENCOUNTER — PATIENT MESSAGE (OUTPATIENT)
Dept: DIABETES | Facility: CLINIC | Age: 67
End: 2017-02-07

## 2017-02-08 ENCOUNTER — ANESTHESIA (OUTPATIENT)
Dept: ENDOSCOPY | Facility: HOSPITAL | Age: 67
End: 2017-02-08
Payer: MEDICARE

## 2017-02-08 ENCOUNTER — HOSPITAL ENCOUNTER (OUTPATIENT)
Facility: HOSPITAL | Age: 67
Discharge: HOME | End: 2017-02-08
Attending: SURGERY | Admitting: SURGERY
Payer: MEDICARE

## 2017-02-08 ENCOUNTER — ANESTHESIA EVENT (OUTPATIENT)
Dept: ENDOSCOPY | Facility: HOSPITAL | Age: 67
End: 2017-02-08
Payer: MEDICARE

## 2017-02-08 ENCOUNTER — SURGERY (OUTPATIENT)
Age: 67
End: 2017-02-08

## 2017-02-08 VITALS
HEART RATE: 92 BPM | DIASTOLIC BLOOD PRESSURE: 87 MMHG | RESPIRATION RATE: 18 BRPM | SYSTOLIC BLOOD PRESSURE: 117 MMHG | OXYGEN SATURATION: 97 % | TEMPERATURE: 98 F | RESPIRATION RATE: 50 BRPM | WEIGHT: 199 LBS | HEIGHT: 63 IN | BODY MASS INDEX: 35.26 KG/M2

## 2017-02-08 DIAGNOSIS — E66.01 MORBID OBESITY DUE TO EXCESS CALORIES: Primary | ICD-10-CM

## 2017-02-08 DIAGNOSIS — K21.9 GERD (GASTROESOPHAGEAL REFLUX DISEASE): ICD-10-CM

## 2017-02-08 LAB — POCT GLUCOSE: 130 MG/DL (ref 70–110)

## 2017-02-08 PROCEDURE — 25000003 PHARM REV CODE 250: Performed by: NURSE ANESTHETIST, CERTIFIED REGISTERED

## 2017-02-08 PROCEDURE — 43235 EGD DIAGNOSTIC BRUSH WASH: CPT | Performed by: SURGERY

## 2017-02-08 PROCEDURE — 43235 EGD DIAGNOSTIC BRUSH WASH: CPT | Mod: ,,, | Performed by: SURGERY

## 2017-02-08 PROCEDURE — 25000003 PHARM REV CODE 250: Performed by: SURGERY

## 2017-02-08 PROCEDURE — 82962 GLUCOSE BLOOD TEST: CPT | Performed by: SURGERY

## 2017-02-08 PROCEDURE — 37000009 HC ANESTHESIA EA ADD 15 MINS: Performed by: SURGERY

## 2017-02-08 PROCEDURE — 37000008 HC ANESTHESIA 1ST 15 MINUTES: Performed by: SURGERY

## 2017-02-08 PROCEDURE — 63600175 PHARM REV CODE 636 W HCPCS: Performed by: NURSE ANESTHETIST, CERTIFIED REGISTERED

## 2017-02-08 RX ORDER — LIDOCAINE HYDROCHLORIDE 10 MG/ML
INJECTION INFILTRATION; PERINEURAL
Status: DISCONTINUED | OUTPATIENT
Start: 2017-02-08 | End: 2017-02-08

## 2017-02-08 RX ORDER — GLYCOPYRROLATE 0.2 MG/ML
INJECTION INTRAMUSCULAR; INTRAVENOUS
Status: DISCONTINUED | OUTPATIENT
Start: 2017-02-08 | End: 2017-02-08

## 2017-02-08 RX ORDER — PROPOFOL 10 MG/ML
VIAL (ML) INTRAVENOUS
Status: DISCONTINUED | OUTPATIENT
Start: 2017-02-08 | End: 2017-02-08

## 2017-02-08 RX ORDER — SODIUM CHLORIDE, SODIUM LACTATE, POTASSIUM CHLORIDE, CALCIUM CHLORIDE 600; 310; 30; 20 MG/100ML; MG/100ML; MG/100ML; MG/100ML
INJECTION, SOLUTION INTRAVENOUS CONTINUOUS
Status: DISCONTINUED | OUTPATIENT
Start: 2017-02-08 | End: 2017-02-08 | Stop reason: HOSPADM

## 2017-02-08 RX ADMIN — PROPOFOL 30 MG: 10 INJECTION, EMULSION INTRAVENOUS at 07:02

## 2017-02-08 RX ADMIN — GLYCOPYRROLATE 0.2 MG: 0.2 INJECTION INTRAMUSCULAR; INTRAVENOUS at 07:02

## 2017-02-08 RX ADMIN — LIDOCAINE HYDROCHLORIDE 50 MG: 10 INJECTION, SOLUTION INFILTRATION; PERINEURAL at 07:02

## 2017-02-08 RX ADMIN — PROPOFOL 20 MG: 10 INJECTION, EMULSION INTRAVENOUS at 07:02

## 2017-02-08 RX ADMIN — SODIUM CHLORIDE, SODIUM LACTATE, POTASSIUM CHLORIDE, AND CALCIUM CHLORIDE: 600; 310; 30; 20 INJECTION, SOLUTION INTRAVENOUS at 06:02

## 2017-02-08 RX ADMIN — SODIUM CHLORIDE, SODIUM LACTATE, POTASSIUM CHLORIDE, AND CALCIUM CHLORIDE: 600; 310; 30; 20 INJECTION, SOLUTION INTRAVENOUS at 07:02

## 2017-02-08 RX ADMIN — PROPOFOL 50 MG: 10 INJECTION, EMULSION INTRAVENOUS at 07:02

## 2017-02-08 NOTE — ANESTHESIA RELEASE NOTE
"Anesthesia Release from PACU Note    Patient: Cassandra Campos    Procedure(s) Performed: Procedure(s) (LRB):  ESOPHAGOGASTRODUODENOSCOPY (EGD) (N/A)    Anesthesia type: MAC    Post pain: Adequate analgesia    Post assessment: no apparent anesthetic complications, tolerated procedure well and no evidence of recall    Last Vitals:   Visit Vitals    BP (!) 143/98 (BP Location: Left arm, Patient Position: Lying, BP Method: Automatic)    Pulse 98    Temp 36.7 °C (98 °F) (Oral)    Resp 18    Ht 5' 3" (1.6 m)    Wt 90.3 kg (199 lb)    SpO2 97%    Breastfeeding No    BMI 35.25 kg/m2       Post vital signs: stable    Level of consciousness: awake    Nausea/Vomiting: no nausea/no vomiting    Complications: none    Airway Patency: patent    Respiratory: unassisted, spontaneous ventilation, room air    Cardiovascular: stable and blood pressure at baseline    Hydration: euvolemic  "

## 2017-02-08 NOTE — DISCHARGE INSTRUCTIONS
Gastritis (Adult)    Gastritis is inflammation and irritation of the stomach lining. It can be present for a short time (acute) or be long lasting (chronic). Gastritis is often caused by infection with bacteria called H pylori. More than a third of people in the [place] have this bacteria in their bodies. In many cases, H pylori causes no problems or symptoms. In some people, though, the infection irritates the stomach lining and causes gastritis. Other causes of stomach irritation include drinking alcohol or taking pain-relieving medicines called NSAIDs (such as aspirin or ibuprofen).   Symptoms of gastritis can include:  · Abdominal pain or bloating  · Loss of appetite  · Nausea or vomiting  · Vomiting blood or having black stools  · Feeling more tired than usual  An inflamed and irritated stomach lining is more likely to develop a sore called an ulcer. To help prevent this, gastritis should be treated.  Home care  If needed, medicines may be prescribed. If you have H pylori infection, treating it will likely relieve your symptoms. Other changes can help reduce stomach irritation and help it heal.  · If you have been prescribed medicines for H pylori infection, take them as directed. Take all of the medicine until it is finished or your healthcare provider tells you to stop, even if you feel better.  · Your healthcare provider may recommend avoiding NSAIDs. If you take daily aspirin for your heart or other medical reasons, do not stop without talking to your healthcare provider first.  · Avoid drinking alcohol.  · Stop smoking. Smoking can irritate the stomach and delay healing. As much as possible, stay away from second hand smoke.  Follow-up care  Follow up with your healthcare provider, or as advised by our staff. Testing may be needed to check for inflammation or an ulcer.  When to seek medical advice  Call your healthcare provider for any of the following:  · Stomach pain that gets worse or moves to the lower  right abdomen (appendix area)  · Chest pain that appears or gets worse, or spreads to the back, neck, shoulder, or arm  · Frequent vomiting (cant keep down liquids)  · Blood in the stool or vomit (red or black in color)  · Feeling weak or dizzy  · Fever of 100.4ºF (38ºC) or higher, or as directed by your healthcare provider  Date Last Reviewed: 6/22/2015  © 2679-4050 [employer]. [address] [place] [address]. All rights reserved. This information is not intended as a substitute for professional medical care. Always follow your healthcare professional's instructions.

## 2017-02-08 NOTE — TRANSFER OF CARE
"Anesthesia Transfer of Care Note    Patient: Cassandra Campos    Procedure(s) Performed: Procedure(s) (LRB):  ESOPHAGOGASTRODUODENOSCOPY (EGD) (N/A)    Patient location: PACU    Anesthesia Type: MAC    Transport from OR: Transported from OR on room air with adequate spontaneous ventilation    Post pain: adequate analgesia    Post assessment: no apparent anesthetic complications and tolerated procedure well    Post vital signs: stable    Level of consciousness: awake    Nausea/Vomiting: no nausea/vomiting    Complications: none          Last vitals:   Visit Vitals    BP (!) 143/98 (BP Location: Left arm, Patient Position: Lying, BP Method: Automatic)    Pulse 98    Temp 36.7 °C (98 °F) (Oral)    Resp 18    Ht 5' 3" (1.6 m)    Wt 90.3 kg (199 lb)    SpO2 97%    Breastfeeding No    BMI 35.25 kg/m2     "

## 2017-02-08 NOTE — INTERVAL H&P NOTE
The patient has been examined and the H&P has been reviewed:    I concur with the findings and no changes have occurred since H&P was written.    Anesthesia/Surgery risks, benefits and alternative options discussed and understood by patient/family.          Active Hospital Problems    Diagnosis  POA    GERD (gastroesophageal reflux disease) [K21.9]  Yes      Resolved Hospital Problems    Diagnosis Date Resolved POA   No resolved problems to display.

## 2017-02-08 NOTE — IP AVS SNAPSHOT
[address] [place] [address]           Patient Discharge Instructions     Our goal is to set you up for success. This packet includes information on your condition, medications, and your home care. It will help you to care for yourself so you don't get sicker and need to go back to the hospital.     Please ask your nurse if you have any questions.        There are many details to remember when preparing to leave the hospital. Here is what you will need to do:    1. Take your medicine. If you are prescribed medications, review your Medication List in the following pages. You may have new medications to  at the pharmacy and others that you'll need to stop taking. Review the instructions for how and when to take your medications. Talk with your doctor or nurses if you are unsure of what to do.     2. Go to your follow-up appointments. Specific follow-up information is listed in the following pages. Your may be contacted by a transition nurse or clinical provider about future appointments. Be sure we have all of the phone numbers to reach you, if needed. Please contact your provider's office if you are unable to make an appointment.     3. Watch for warning signs. Your doctor or nurse will give you detailed warning signs to watch for and when to call for assistance. These instructions may also include educational information about your condition. If you experience any of warning signs to your health, call your doctor.               ** Verify the list of medication(s) below is accurate and up to date. Carry this with you in case of emergency. If your medications have changed, please notify your healthcare provider.             Medication List      CONTINUE taking these medications        Additional Info                      ACCU-CHEK FASTCLIX Misc   Quantity:  100 each   Refills:  3   Generic drug:  lancets    Instructions:  USE ONE TIME DAILY     Begin Date    AM     Noon    PM    Bedtime       amitriptyline 100 MG tablet   Commonly known as:  ELAVIL   Quantity:  90 tablet   Refills:  0    Instructions:  TAKE 1 TABLET (100 MG TOTAL) BY MOUTH NIGHTLY.     Begin Date    AM    Noon    PM    Bedtime       aspirin 81 MG EC tablet   Commonly known as:  ECOTRIN   Refills:  0   Dose:  81 mg    Instructions:  81 mg once daily.     Begin Date    AM    Noon    PM    Bedtime       blood sugar diagnostic Strp   Commonly known as:  ACCU-CHEK SMARTVIEW TEST STRIP   Quantity:  100 strip   Refills:  0    Instructions:  Use to test once daily     Begin Date    AM    Noon    PM    Bedtime       * blood-glucose meter Misc   Quantity:  1 each   Refills:  0   Dose:  1 each   Comments:  Pt requests Accuchek Saadia replacement - it is not working after changing battery.    Instructions:  1 each by Misc.(Non-Drug; Combo Route) route once daily.     Begin Date    AM    Noon    PM    Bedtime       * blood-glucose meter kit   Quantity:  1 each   Refills:  0   Comments:  accu-chek laya connect meter     Instructions:  Use as instructed     Begin Date    AM    Noon    PM    Bedtime       eszopiclone 3 mg Tab   Commonly known as:  LUNESTA   Quantity:  90 tablet   Refills:  0   Dose:  3 mg   Comments:  This replaces the 90 day rx sent to local [facility] in error    Instructions:  Take 1 tablet (3 mg total) by mouth nightly as needed.     Begin Date    AM    Noon    PM    Bedtime       hydrocodone-acetaminophen 7.5-325mg 7.5-325 mg per tablet   Commonly known as:  NORCO   Quantity:  15 tablet   Refills:  0   Dose:  1 tablet    Instructions:  Take 1 tablet by mouth daily as needed for Pain.     Begin Date    AM    Noon    PM    Bedtime       metformin 500 MG 24 hr tablet   Commonly known as:  GLUCOPHAGE-XR   Quantity:  360 tablet   Refills:  1    Instructions:  4 tablets with meal daily     Begin Date    AM    Noon    PM    Bedtime       metoprolol succinate 50 MG 24 hr tablet   Commonly known as:  TOPROL-XL    Quantity:  90 tablet   Refills:  1   Dose:  50 mg    Instructions:  Take 1 tablet (50 mg total) by mouth once daily.     Begin Date    AM    Noon    PM    Bedtime       mupirocin 2 % ointment   Commonly known as:  BACTROBAN   Refills:  0      Begin Date    AM    Noon    PM    Bedtime       nabumetone 500 MG tablet   Commonly known as:  RELAFEN   Refills:  0   Dose:  500 mg    Instructions:  Take 500 mg by mouth once daily.     Begin Date    AM    Noon    PM    Bedtime       omeprazole 20 MG capsule   Commonly known as:  PRILOSEC   Quantity:  90 capsule   Refills:  1   Dose:  20 mg    Instructions:  Take 1 capsule (20 mg total) by mouth once daily.     Begin Date    AM    Noon    PM    Bedtime       pravastatin 40 MG tablet   Commonly known as:  PRAVACHOL   Quantity:  30 tablet   Refills:  0    Instructions:  TAKE 1 TABLET ONE TIME DAILY     Begin Date    AM    Noon    PM    Bedtime       * Notice:  This list has 2 medication(s) that are the same as other medications prescribed for you. Read the directions carefully, and ask your doctor or other care provider to review them with you.      STOP taking these medications     CENTRUM SILVER ORAL                  Please bring to all follow up appointments:    1. A copy of your discharge instructions.  2. All medicines you are currently taking in their original bottles.  3. Identification and insurance card.    Please arrive 15 minutes ahead of scheduled appointment time.    Please call 24 hours in advance if you must reschedule your appointment and/or time.        Your Scheduled Appointments     Feb 09, 2017  3:30 PM CST   Consult with Jayro Pedraza Sr., MD   [facility]    [address] [place] [address]   [phone]            Mar 07, 2017  1:00 PM CST   Post OP with Tsering Bazzi PA-C   [facility] ([facility]    [address] [place] [address]   [phone]            Jun 29, 2017  9:40 AM CDT   Established Patient  Visit with Denia Maldonado MD   [facility] [place]    [address] [id number] [phone]              Your Future Surgeries/Procedures     Feb 08, 2017   Surgery with Rashaad Watson MD   [facility] ([facility]    [address] [address] [phone]            Feb 13, 2017   Surgery with Rashaad Watson MD   [facility])    [address] [address] [phone]              Follow-up Information     Follow up with Rashaad Watson MD.    Specialty:  General Surgery    Why:  As needed    Contact information:    [address]  [facility] [address] [phone]          Discharge Instructions     Future Orders    Activity as tolerated     Call MD for:  difficulty breathing, headache or visual disturbances     Call MD for:  hives     Call MD for:  persistent dizziness or light-headedness     Call MD for:  persistent nausea and vomiting     Call MD for:  redness, tenderness, or signs of infection (pain, swelling, redness, odor or green/yellow discharge around incision site)     Call MD for:  severe uncontrolled pain     Call MD for:  temperature >100.4     Diet general     Questions:    Total calories:      Fat restriction, if any:      Protein restriction, if any:      Na restriction, if any:      Fluid restriction:      Additional restrictions:      No dressing needed         Discharge Instructions         Gastritis (Adult)    Gastritis is inflammation and irritation of the stomach lining. It can be present for a short time (acute) or be long lasting (chronic). Gastritis is often caused by infection with bacteria called H pylori. More than a third of people in the  have this bacteria in their bodies. In many cases, H pylori causes no problems or symptoms. In some people, though, the infection irritates the stomach lining and causes gastritis. Other causes of  stomach irritation include drinking alcohol or taking pain-relieving medicines called NSAIDs (such as aspirin or ibuprofen).   Symptoms of gastritis can include:  · Abdominal pain or bloating  · Loss of appetite  · Nausea or vomiting  · Vomiting blood or having black stools  · Feeling more tired than usual  An inflamed and irritated stomach lining is more likely to develop a sore called an ulcer. To help prevent this, gastritis should be treated.  Home care  If needed, medicines may be prescribed. If you have H pylori infection, treating it will likely relieve your symptoms. Other changes can help reduce stomach irritation and help it heal.  · If you have been prescribed medicines for H pylori infection, take them as directed. Take all of the medicine until it is finished or your healthcare provider tells you to stop, even if you feel better.  · Your healthcare provider may recommend avoiding NSAIDs. If you take daily aspirin for your heart or other medical reasons, do not stop without talking to your healthcare provider first.  · Avoid drinking alcohol.  · Stop smoking. Smoking can irritate the stomach and delay healing. As much as possible, stay away from second hand smoke.  Follow-up care  Follow up with your healthcare provider, or as advised by our staff. Testing may be needed to check for inflammation or an ulcer.  When to seek medical advice  Call your healthcare provider for any of the following:  · Stomach pain that gets worse or moves to the lower right abdomen (appendix area)  · Chest pain that appears or gets worse, or spreads to the back, neck, shoulder, or arm  · Frequent vomiting (cant keep down liquids)  · Blood in the stool or vomit (red or black in color)  · Feeling weak or dizzy  · Fever of 100.4ºF (38ºC) or higher, or as directed by your healthcare provider  Date Last Reviewed: 6/22/2015  © 8809-1118 [employer]. [address] [place] [address], [place] [address]. All rights reserved. This  "information is not intended as a substitute for professional medical care. Always follow your healthcare professional's instructions.            Primary Diagnosis     Your primary diagnosis was:  Acid Reflux Disease      Admission Information     Date & Time Provider Department CSN    2/8/2017  6:10 AM Rashaad Watson MD [facility] [id number]      Care Providers     Provider Role Specialty Primary office phone    Rashaad Watson MD Attending Provider General Surgery [phone]    Rashaad Watson MD Surgeon  General Surgery [phone]      Your Vitals Were     BP Pulse Temp Resp Height Weight    107/85 (BP Location: Left arm, Patient Position: Lying, BP Method: Automatic) 102 97.8 °F (36.6 °C) (Oral) 18 5' 3" (1.6 m) 90.3 kg (199 lb)    SpO2 BMI             97% 35.25 kg/m2         Recent Lab Values        5/21/2014 9/2/2014 1/20/2015 6/4/2015 12/22/2015 6/15/2016 11/28/2016         8:34 AM 10:41 AM  1:17 PM 10:30 AM  9:46 AM  8:53 AM  2:09 PM     A1C 6.7 (H) 6.8 (H) 6.9 (H) 6.6 (H) 6.7 (H) 6.6 (H) 7.4 (H)     Comment for A1C at  2:09 PM on 11/28/2016:  According to ADA guidelines, hemoglobin A1C <7.0% represents  optimal control in non-pregnant diabetic patients.  Different  metrics may apply to specific populations.   Standards of Medical Care in Diabetes - 2016.  For the purpose of screening for the presence of diabetes:  <5.7%     Consistent with the absence of diabetes  5.7-6.4%  Consistent with increasing risk for diabetes   (prediabetes)  >or=6.5%  Consistent with diabetes  Currently no consensus exists for use of hemoglobin A1C  for diagnosis of diabetes for children.        Allergies as of 2/8/2017     No Known Allergies      [facility]     [facility] Care [facility] - 24/7 Assistance  Unless otherwise directed by your provider, please contact [facility], our nurse care line that is available for 24/7 assistance.     Registered nurses in the [facility] provide clinical " advisement, health education, appointment booking, and other advisory services.  Call for this free service at [phone].        Advance Directives     An advance directive is a document which, in the event you are no longer able to make decisions for yourself, tells your healthcare team what kind of treatment you do or do not want to receive, or who you would like to make those decisions for you.  If you do not currently have an advance directive, [facility] encourages you to create one.  For more information call:  [phone]), [phone] ([phone]),  or log on to [email or web address].        Smoking Cessation     If you would like to quit smoking:   You may be eligible for free services if you are a [place] resident and started smoking cigarettes before September 1, 1988.  Call the Smoking Cessation Trust (SCT) toll free at (497) 658-1916 or (252) 932-5969.   Call 2-065-QUIT-NOW if you do not meet the above criteria.            Language Assistance Services     ATTENTION: Language assistance services are available, free of charge. Please call [phone].      ATENCIÓN: Si habla español, tiene a machuca disposición servicios gratuitos de asistencia lingüística. Llame al [phone].     CHÚ Ý: N?u b?n nói Ti?ng Vi?t, có các d?ch v? h? tr? ngôn ng? mi?n phí dành cho b?n. G?i s? [phone].        Diabetes Discharge Instructions                                    [facility] complies with applicable Federal civil rights laws and does not discriminate on the basis of race, color, national origin, age, disability, or sex.

## 2017-02-08 NOTE — DISCHARGE SUMMARY
[facility]  Short Stay  Discharge Summary    Admit Date: 2/8/2017    Discharge Date and Time: 2/8/2017      Discharge Attending Physician: Rashaad Watson MD     Reason for Admission: preoperative evaluation    Hospital Course (synopsis of major diagnoses, care, treatment, and services provided during the course of the hospital stay): Uneventful and full recovery    Final Diagnoses:    Principal Problem: GERD (gastroesophageal reflux disease)   Secondary Diagnoses: GERD (gastroesophageal reflux disease)    Procedures Performed: Procedure(s) (LRB):  ESOPHAGOGASTRODUODENOSCOPY (EGD) (N/A)      Discharged Condition: good    Disposition: Home or Self Care    Discharge Condition: Stable    Follow up/Patient Instructions:  Follow-up Information     Follow up with Rashaad Watson MD.    Specialty:  General Surgery    Why:  As needed    Contact information:    [address]  [place] [address] [phone]            Medications:      Medication List      CONTINUE taking these medications          ACCU-CHEK FASTCLIX Misc   Generic drug:  lancets   USE ONE TIME DAILY       amitriptyline 100 MG tablet   Commonly known as:  ELAVIL   TAKE 1 TABLET (100 MG TOTAL) BY MOUTH NIGHTLY.       aspirin 81 MG EC tablet   Commonly known as:  ECOTRIN       blood sugar diagnostic Strp   Commonly known as:  ACCU-CHEK SMARTVIEW TEST STRIP   Use to test once daily       * blood-glucose meter Misc   1 each by Misc.(Non-Drug; Combo Route) route once daily.       * blood-glucose meter kit   Use as instructed       eszopiclone 3 mg Tab   Commonly known as:  LUNESTA   Take 1 tablet (3 mg total) by mouth nightly as needed.       hydrocodone-acetaminophen 7.5-325mg 7.5-325 mg per tablet   Commonly known as:  NORCO   Take 1 tablet by mouth daily as needed for Pain.       metformin 500 MG 24 hr tablet   Commonly known as:  GLUCOPHAGE-XR   4 tablets with meal daily       metoprolol succinate 50 MG 24 hr tablet   Commonly known as:  TOPROL-XL    Take 1 tablet (50 mg total) by mouth once daily.       mupirocin 2 % ointment   Commonly known as:  BACTROBAN       nabumetone 500 MG tablet   Commonly known as:  RELAFEN       omeprazole 20 MG capsule   Commonly known as:  PRILOSEC   Take 1 capsule (20 mg total) by mouth once daily.       pravastatin 40 MG tablet   Commonly known as:  PRAVACHOL   TAKE 1 TABLET ONE TIME DAILY       * Notice:  This list has 2 medication(s) that are the same as other medications prescribed for you. Read the directions carefully, and ask your doctor or other care provider to review them with you.      STOP taking these medications          CENTRUM SILVER ORAL             Discharge Procedure Orders  Diet general     Activity as tolerated     Call MD for:  temperature >100.4     Call MD for:  persistent nausea and vomiting     Call MD for:  severe uncontrolled pain     Call MD for:  difficulty breathing, headache or visual disturbances     Call MD for:  redness, tenderness, or signs of infection (pain, swelling, redness, odor or green/yellow discharge around incision site)     Call MD for:  hives     Call MD for:  persistent dizziness or light-headedness     No dressing needed       Rashaad Watson

## 2017-02-08 NOTE — ANESTHESIA PREPROCEDURE EVALUATION
02/08/2017  Cassandra Campos is a 66 y.o., female.    [facility] Anesthesia Evaluation    I have reviewed the Patient Summary Reports.    I have reviewed the Nursing Notes.   I have reviewed the Medications.     Review of Systems  Anesthesia Hx:  No problems with previous Anesthesia  Denies Family Hx of Anesthesia complications.   Denies Personal Hx of Anesthesia complications.   Social:  Former Smoker, Social Alcohol Use    Hematology/Oncology:  Hematology Normal   Oncology Normal     Cardiovascular:   Hypertension Denies MI.   Denies CABG/stent. Dysrhythmias      hyperlipidemia ECG has been reviewed. ekg 11/2016:  Sinus tachycardia 104  Left anterior fascicular block  Abnormal ECG  When compared with ECG of 16-MAY-2016 09:52   Pulmonary:   Denies COPD.  Denies Asthma.  Denies Sleep Apnea.    Renal/:  Renal/ Normal     Hepatic/GI:   GERD Denies Liver Disease. Denies Hepatitis.    Musculoskeletal:   Arthritis     Neurological:   Denies CVA. Headaches Denies Seizures.    Endocrine:   Diabetes, type 2 Denies Hypothyroidism. Denies Hyperthyroidism.        Physical Exam  General:  Morbid Obesity    Airway/Jaw/Neck:  Airway Findings: Mouth Opening: Normal Tongue: Normal  General Airway Assessment: Adult  Mallampati: II      Dental:  Dental Findings: Upper Dentures, Lower Dentures   Chest/Lungs:  Chest/Lungs Findings: Clear to auscultation, Normal Respiratory Rate     Heart/Vascular:  Heart Findings: Rate: Normal  Rhythm: Regular Rhythm  Sounds: Normal             Anesthesia Plan  Type of Anesthesia, risks & benefits discussed:  Anesthesia Type:  MAC  Patient's Preference:   Intra-op Monitoring Plan: standard ASA monitors  Intra-op Monitoring Plan Comments:   Post Op Pain Control Plan:   Post Op Pain Control Plan Comments:   Induction:   IV  Beta Blocker:  Patient is on a Beta-Blocker and has received one dose  within the past 24 hours (No further documentation required).       Informed Consent: Patient understands risks and agrees with Anesthesia plan.  Questions answered. Anesthesia consent signed with patient.  ASA Score: 2     Day of Surgery Review of History & Physical: I have interviewed and examined the patient. I have reviewed the patient's H&P dated:  There are no significant changes.  H&P update referred to the surgeon.         Ready For Surgery From Anesthesia Perspective.

## 2017-02-08 NOTE — ANESTHESIA POSTPROCEDURE EVALUATION
"Anesthesia Post Evaluation    Patient: Cassandra Campos    Procedure(s) Performed: Procedure(s) (LRB):  ESOPHAGOGASTRODUODENOSCOPY (EGD) (N/A)    Final Anesthesia Type: MAC  Patient location during evaluation: PACU  Patient participation: Yes- Able to Participate  Level of consciousness: awake  Post-procedure vital signs: reviewed and stable  Pain management: adequate  Airway patency: patent  PONV status at discharge: No PONV  Anesthetic complications: no      Cardiovascular status: blood pressure returned to baseline and hemodynamically stable  Respiratory status: unassisted, spontaneous ventilation and room air  Hydration status: euvolemic  Follow-up not needed.        Visit Vitals    /85 (BP Location: Left arm, Patient Position: Lying, BP Method: Automatic)    Pulse 102    Temp 36.6 °C (97.8 °F) (Oral)    Resp 18    Ht 5' 3" (1.6 m)    Wt 90.3 kg (199 lb)    SpO2 97%    Breastfeeding No    BMI 35.25 kg/m2       Pain/Francheska Score: Pain Assessment Performed: Yes (2/8/2017  6:42 AM)  Presence of Pain: denies (2/8/2017  6:42 AM)      "

## 2017-02-09 ENCOUNTER — OFFICE VISIT (OUTPATIENT)
Dept: ORTHOPEDICS | Facility: CLINIC | Age: 67
End: 2017-02-09
Payer: MEDICARE

## 2017-02-09 VITALS
HEIGHT: 63 IN | HEART RATE: 97 BPM | SYSTOLIC BLOOD PRESSURE: 111 MMHG | BODY MASS INDEX: 34.38 KG/M2 | RESPIRATION RATE: 12 BRPM | DIASTOLIC BLOOD PRESSURE: 75 MMHG | WEIGHT: 194 LBS

## 2017-02-09 DIAGNOSIS — M70.41 PREPATELLAR BURSITIS OF RIGHT KNEE: Primary | ICD-10-CM

## 2017-02-09 PROCEDURE — 99999 PR PBB SHADOW E&M-EST. PATIENT-LVL III: CPT | Mod: PBBFAC,,, | Performed by: ORTHOPAEDIC SURGERY

## 2017-02-09 PROCEDURE — 3074F SYST BP LT 130 MM HG: CPT | Mod: S$GLB,,, | Performed by: ORTHOPAEDIC SURGERY

## 2017-02-09 PROCEDURE — 1159F MED LIST DOCD IN RCRD: CPT | Mod: S$GLB,,, | Performed by: ORTHOPAEDIC SURGERY

## 2017-02-09 PROCEDURE — 1125F AMNT PAIN NOTED PAIN PRSNT: CPT | Mod: S$GLB,,, | Performed by: ORTHOPAEDIC SURGERY

## 2017-02-09 PROCEDURE — 99214 OFFICE O/P EST MOD 30 MIN: CPT | Mod: S$GLB,,, | Performed by: ORTHOPAEDIC SURGERY

## 2017-02-09 PROCEDURE — 3078F DIAST BP <80 MM HG: CPT | Mod: S$GLB,,, | Performed by: ORTHOPAEDIC SURGERY

## 2017-02-09 PROCEDURE — 1157F ADVNC CARE PLAN IN RCRD: CPT | Mod: S$GLB,,, | Performed by: ORTHOPAEDIC SURGERY

## 2017-02-09 PROCEDURE — 1160F RVW MEDS BY RX/DR IN RCRD: CPT | Mod: S$GLB,,, | Performed by: ORTHOPAEDIC SURGERY

## 2017-02-09 NOTE — PROGRESS NOTES
CC:This is a 66-year-old female that complains of right knee swelling.    HPI:The patient has had a recurrent and persistent right prepatellar bursitis.    PMH:    Past Medical History   Diagnosis Date    Borderline glaucoma     COPD (chronic obstructive pulmonary disease)     Diabetes mellitus, type 2     Gastritis     Hyperlipidemia     Hypertension     Insomnia     Migraines 2000    Nasal septum perforation     SVT (supraventricular tachycardia) 2013    Type II or unspecified type diabetes mellitus without mention of complication, not stated as uncontrolled 2013       PSH:    Past Surgical History   Procedure Laterality Date    Cholecystectomy  2014    Tonsillectomy, adenoidectomy      Carpal tunnel release       bilateral    Cataract extraction       OU    Axillary hidradenitis excision      Breast lumpectomy Bilateral 1998     Benign    Trigger finger release Right 2015     Dr. Pedraza    Cyst removal  2015     sebaceous cyst removed from face     section         Family Hx:    Family History   Problem Relation Age of Onset    Prostate cancer Brother     Diabetes Maternal Aunt        Allergy:  Review of patient's allergies indicates:  No Known Allergies    Medication:    Current Outpatient Prescriptions:     ACCU-CHEK FASTCLIX Misc, USE ONE TIME DAILY, Disp: 100 each, Rfl: 3    amitriptyline (ELAVIL) 100 MG tablet, TAKE 1 TABLET (100 MG TOTAL) BY MOUTH NIGHTLY., Disp: 90 tablet, Rfl: 0    aspirin (ECOTRIN) 81 MG EC tablet, 81 mg once daily. , Disp: , Rfl:     blood sugar diagnostic (ACCU-CHEK SMARTVIEW TEST STRIP) Strp, Use to test once daily, Disp: 100 strip, Rfl: 0    blood-glucose meter kit, Use as instructed, Disp: 1 each, Rfl: 0    blood-glucose meter Misc, 1 each by Misc.(Non-Drug; Combo Route) route once daily., Disp: 1 each, Rfl: 0    eszopiclone (LUNESTA) 3 mg Tab, Take 1 tablet (3 mg total) by mouth nightly as needed., Disp: 90  "tablet, Rfl: 0    hydrocodone-acetaminophen 7.5-325mg (NORCO) 7.5-325 mg per tablet, Take 1 tablet by mouth daily as needed for Pain., Disp: 15 tablet, Rfl: 0    metformin (GLUCOPHAGE-XR) 500 MG 24 hr tablet, 4 tablets with meal daily, Disp: 360 tablet, Rfl: 1    metoprolol succinate (TOPROL-XL) 50 MG 24 hr tablet, Take 1 tablet (50 mg total) by mouth once daily., Disp: 90 tablet, Rfl: 1    mupirocin (BACTROBAN) 2 % ointment, , Disp: , Rfl:     nabumetone (RELAFEN) 500 MG tablet, Take 500 mg by mouth once daily., Disp: , Rfl:     omeprazole (PRILOSEC) 20 MG capsule, Take 1 capsule (20 mg total) by mouth once daily., Disp: 90 capsule, Rfl: 1    pravastatin (PRAVACHOL) 40 MG tablet, TAKE 1 TABLET ONE TIME DAILY, Disp: 30 tablet, Rfl: 0    Social History:    Social History     Social History    Marital status:      Spouse name: N/A    Number of children: 1    Years of education: N/A     Occupational History     aid      Social History Main Topics    Smoking status: Former Smoker     Packs/day: 0.50     Years: 30.00     Types: Cigarettes     Start date: 1/1/2012    Smokeless tobacco: Never Used    Alcohol use Yes      Comment: rarely // wine     Drug use: No    Sexual activity: Not Currently     Other Topics Concern    Not on file     Social History Narrative    Single, part-time teacher. Masters degree biology.        Vitals:     Visit Vitals    /75    Pulse 97    Resp 12    Ht 5' 3" (1.6 m)    Wt 88 kg (194 lb 0.1 oz)    BMI 34.37 kg/m2        ROS:  GENERAL: No fever, chills, fatigability or weight loss.  SKIN: No rashes, itching or changes in color or texture of skin.  HEAD: No headaches or recent head trauma.  EYES: Visual acuity fine. No photophobia, ocular pain or diplopia.  EARS: Denies ear pain, discharge or vertigo.  NOSE: No loss of smell, no epistaxis or postnasal drip.  MOUTH & THROAT: No hoarseness or change in voice. No excessive gum bleeding.  NODES: " Denies swollen glands.  CHEST: Denies BOGGS, cyanosis, wheezing, cough and sputum production.  CARDIOVASCULAR: Denies chest pain, PND, orthopnea or reduced exercise tolerance.  ABDOMEN: Appetite fine. No weight loss. Denies diarrhea, abdominal pain, hematemesis or blood in stool.  URINARY: No flank pain, dysuria or hematuria.  PERIPHERAL VASCULAR: No claudication or cyanosis.  NEUROLOGIC: No history of seizures, paralysis, alteration of gait or coordination.  MUSCULOSKELETAL: See HPI    PE:  APPEARANCE: Well nourished, well developed, in no acute distress.   HEAD: Normocephalic, atraumatic.  EYES: PERRL. EOMI.   EARS: TM's intact. Light reflex normal. No retraction or perforation.   NOSE: Mucosa pink. Airway clear.  MOUTH & THROAT: No tonsillar enlargement. No pharyngeal erythema or exudate. No stridor.  NECK: Supple.   NODES: No cervical, axillary or inguinal lymph node enlargement.  CHEST: Lungs clear to auscultation.  CARDIOVASCULAR: Normal S1, S2. No rubs, murmurs or gallops.  ABDOMEN: Bowel sounds normal. Not distended. Soft. No tenderness or masses.  NEUROLOGIC: Cranial Nerves: II-XII grossly intact, also see MUSCULOSKELETAL  MUSCULOSKELETAL:             Right Knee  2 plus dorsalis pedis and posterior tibial artery pulses, light touch intact Right lower extremity.  All digits are warm. No erythema, no warmth, no drainage, Mild swelling, no significant tenderness.  Less than 2 seconds capillary refill all digits.  4 x 4 centimeter prepatellar bursa with no erythema         Assessment:           Diagnosis:              1.right prepatellar bursitis                    Diagnostic Studies  MRI-No  X-Ray-No  EMG/NCV-No  Arthrogram-No  Bone Scan-No  CT Scan-No  Doppler-No  ESR-No  CRP-No  CBC with Diff-No   Rheumatoid/Arthritis Panel-No      Plan:                                                 1. PT-no                                                 2.OT-no                                          3.NSAID-no                                         4. Narcotics-no                                     5. Wound care-No                                 6. Rest-yes                                           7. Surgery-yes                                         8. VAZQUEZ Hose-no                                    9. Anticoagulation therapy-no               10. Elevation-no                                     11. Crutches-no                                    12. Walker-no             13. Cane no                        14. Referral-no                                     15.Injection-no                            16. Splint   /    Cast   /   Cast Shoe-No              17. RICE-none            18. Follow up- 3 weeks

## 2017-02-09 NOTE — LETTER
February 9, 2017      Denia Maldonado MD  [address] [place] [address]           [facility]  [address] [place] [address]  Phone: [phone]  Fax: [phone]          Patient: Cassandra Campos   MR Number: [id number]   YOB: 1950   Date of Visit: 2/9/2017       Dear Dr. Denia Maldonado:    Thank you for referring Cassandra Campos to me for evaluation. Attached you will find relevant portions of my assessment and plan of care.    If you have questions, please do not hesitate to call me. I look forward to following Cassandra Campos along with you.    Sincerely,    Jayro Pedraza Sr., MD    Enclosure  CC:  No Recipients    If you would like to receive this communication electronically, please contact [email or web address] or [phone] to request more information on [employer] access.    For providers and/or their staff who would like to refer a patient to [facility], please contact us through our one-stop-shop provider referral line, [facility], at [phone].    If you feel you have received this communication in error or would no longer like to receive these types of communications, please e-mail [email or web address]

## 2017-02-10 ENCOUNTER — ANESTHESIA EVENT (OUTPATIENT)
Dept: SURGERY | Facility: HOSPITAL | Age: 67
DRG: 621 | End: 2017-02-10
Payer: MEDICARE

## 2017-02-10 DIAGNOSIS — E11.9 TYPE II OR UNSPECIFIED TYPE DIABETES MELLITUS WITHOUT MENTION OF COMPLICATION, NOT STATED AS UNCONTROLLED: ICD-10-CM

## 2017-02-10 RX ORDER — METFORMIN HYDROCHLORIDE 500 MG/1
TABLET, EXTENDED RELEASE ORAL
Qty: 270 TABLET | Refills: 0 | Status: SHIPPED | OUTPATIENT
Start: 2017-02-10 | End: 2017-04-12 | Stop reason: SDUPTHER

## 2017-02-10 RX ORDER — AMITRIPTYLINE HYDROCHLORIDE 100 MG/1
TABLET ORAL
Qty: 90 TABLET | Refills: 0 | Status: SHIPPED | OUTPATIENT
Start: 2017-02-10 | End: 2017-04-11 | Stop reason: SDUPTHER

## 2017-02-10 NOTE — PRE ADMISSION SCREENING
Pre op instructions reviewed with patient per phone:    To confirm, Your surgeon has instructed you:  Surgery is scheduled 02/13/17 radha 1025.      Please report to [facility] 1st floor main lobby by 0900  Pre admit office to call later today only if arrival time changes.      INSTRUCTIONS IMPORTANT!!!  ¨ Do not eat, drink, or smoke after 12 midnight-including water. OK to brush teeth, no gum, candy or mints!    ¨ Take only these medicines with a small swallow of water-morning of surgery.  N/A    Hold Metformin 24 h prior to surgery      ____  Do not wear makeup, including mascara.  ____  No powder, lotions or creams to surgical area.  ____  Please remove all jewelry, including piercings and leave at home.  ____  No money or valuables needed. Please leave at home.  ____  Please bring identification and insurance information to hospital.  ____  If going home the same day, arrange for a ride home. You will not be able to   drive if Anesthesia was used.  ____  Children, under 12 years old, must remain in the waiting room with an adult.  They are not allowed in patient areas.  ____  Wear loose fitting clothing. Allow for dressings, bandages.  ____  Stop Aspirin, Ibuprofen, Motrin and Aleve at least 5-7 days before surgery, unless otherwise instructed by your doctor, or the nurse.   You MAY use Tylenol/acetaminophen until day of surgery.  ____  If you take diabetic medication, do not take am of surgery unless instructed by   Doctor.  ____ Stop taking any Fish Oil supplement or any Vitamins that contain Vitamin E at least 5 days prior to surgery.          Bathing Instructions-- The night before surgery and the morning prior to coming to the hospital:   -Do not shave the surgical area.   -Shower and wash your hair and body as usual with your regular soap and shampoo.   -Rinse your hair and body completely.   -Use one packet of hibiclens to wash the surgical site (using your hand) gently for 5  minutes.  Do not scrub you skin too hard.   -Do not use hibiclens on your head, face, or genitals.   -Do not wash with regular soap after you use the hibiclens.   -Rinse your body thoroughly.   -Dry with clean, soft towel.  Do not use lotion, cream, deodorant, or powders on   the surgical site.    Use antibacterial soap in place of hibiclens if your surgery is on the head, face or genitals.         Surgical Site Infection    Prevention of surgical site infections:     -Keep incisions clean and dry.   -Do not soak/submerge incisions in water until completely healed.   -Do not apply lotions, powders, creams, or deodorants to site.   -Always make sure hands are cleaned with antibacterial soap/ alcohol-based   prior to touching the surgical site.  (This includes doctors, nurses, staff, and yourself.)    Signs and symptoms:   -Redness and pain around the area where you had surgery   -Drainage of cloudy fluid from your surgical wound   -Fever over 100.4  I have read or had read and explained to me, and understand the above information.

## 2017-02-10 NOTE — PATIENT INSTRUCTIONS
What is bursitis?  A bursa is a fluid-filled sac that helps cushion the muscles, tendons, and bones around a joint. When a bursa becomes inflamed, its called bursitis. Common symptoms of bursitis include pain, tenderness, and swelling that limits movement of the joint.  What causes bursitis?  Bursitis is most often caused by overuse of a joint. The repeated movements irritate the bursa and may cause it to swell. When that happens, other surrounding tissues may become inflamed or have less space to move. Bursitis is most common in large joints such as the knee, shoulder, and hip.       Nonsurgical treatment involves both rest and exercise.    How is bursitis treated?  To help reduce pain and swelling, your healthcare provider may recommend one or more of the following:   · Rest gives the bursa time to heal. This means limiting activities that put stress on the joint.  · Anti-inflammatory medications help reduce painful swelling. In some cases, this can include injections of cortisone or other steroid medicines into the bursa.  · Splints and support bandages improve your comfort and allow the bursa to heal.  · Physical therapy may be used to increase flexibility and strengthen muscles that support the joint.  · Aspiration removes extra fluid from the bursa using a needle. This can help your healthcare provider find out what is causing your bursitis. For example, it might be an infection or overuse.   · Surgery can be used to remove an inflamed or infected bursa. This is rarely needed.  Date Last Reviewed: 9/11/2015  © 5803-8962 [employer]. [address] [place] [address], [place] [address]. All rights reserved. This information is not intended as a substitute for professional medical care. Always follow your healthcare professional's instructions.

## 2017-02-13 ENCOUNTER — ANESTHESIA (OUTPATIENT)
Dept: SURGERY | Facility: HOSPITAL | Age: 67
DRG: 621 | End: 2017-02-13
Payer: MEDICARE

## 2017-02-13 ENCOUNTER — SURGERY (OUTPATIENT)
Age: 67
End: 2017-02-13

## 2017-02-13 ENCOUNTER — HOSPITAL ENCOUNTER (INPATIENT)
Facility: HOSPITAL | Age: 67
LOS: 2 days | Discharge: HOME | DRG: 621 | End: 2017-02-15
Attending: SURGERY | Admitting: SURGERY
Payer: MEDICARE

## 2017-02-13 DIAGNOSIS — E66.01 MORBID OBESITY DUE TO EXCESS CALORIES: ICD-10-CM

## 2017-02-13 LAB
POCT GLUCOSE: 131 MG/DL (ref 70–110)
POCT GLUCOSE: 141 MG/DL (ref 70–110)
POCT GLUCOSE: 151 MG/DL (ref 70–110)
POCT GLUCOSE: 161 MG/DL (ref 70–110)

## 2017-02-13 PROCEDURE — 71000016 HC POSTOP RECOV ADDL HR: Performed by: SURGERY

## 2017-02-13 PROCEDURE — 63600175 PHARM REV CODE 636 W HCPCS: Performed by: NURSE ANESTHETIST, CERTIFIED REGISTERED

## 2017-02-13 PROCEDURE — 11000001 HC ACUTE MED/SURG PRIVATE ROOM

## 2017-02-13 PROCEDURE — 88305 TISSUE EXAM BY PATHOLOGIST: CPT | Performed by: PATHOLOGY

## 2017-02-13 PROCEDURE — 25000003 PHARM REV CODE 250: Performed by: SURGERY

## 2017-02-13 PROCEDURE — 36000713 HC OR TIME LEV V EA ADD 15 MIN: Performed by: SURGERY

## 2017-02-13 PROCEDURE — 37000008 HC ANESTHESIA 1ST 15 MINUTES: Performed by: SURGERY

## 2017-02-13 PROCEDURE — 63600175 PHARM REV CODE 636 W HCPCS: Performed by: SURGERY

## 2017-02-13 PROCEDURE — 27201423 OPTIME MED/SURG SUP & DEVICES STERILE SUPPLY: Performed by: SURGERY

## 2017-02-13 PROCEDURE — 71000015 HC POSTOP RECOV 1ST HR: Performed by: SURGERY

## 2017-02-13 PROCEDURE — 83036 HEMOGLOBIN GLYCOSYLATED A1C: CPT

## 2017-02-13 PROCEDURE — 36000712 HC OR TIME LEV V 1ST 15 MIN: Performed by: SURGERY

## 2017-02-13 PROCEDURE — 25000003 PHARM REV CODE 250: Performed by: NURSE ANESTHETIST, CERTIFIED REGISTERED

## 2017-02-13 PROCEDURE — 88309 TISSUE EXAM BY PATHOLOGIST: CPT | Mod: 26,,, | Performed by: PATHOLOGY

## 2017-02-13 PROCEDURE — C9113 INJ PANTOPRAZOLE SODIUM, VIA: HCPCS | Performed by: SURGERY

## 2017-02-13 PROCEDURE — 0DB64Z3 EXCISION OF STOMACH, PERCUTANEOUS ENDOSCOPIC APPROACH, VERTICAL: ICD-10-PCS | Performed by: SURGERY

## 2017-02-13 PROCEDURE — 88305 TISSUE EXAM BY PATHOLOGIST: CPT | Mod: 26,,, | Performed by: PATHOLOGY

## 2017-02-13 PROCEDURE — 43775 LAP SLEEVE GASTRECTOMY: CPT | Mod: ,,, | Performed by: SURGERY

## 2017-02-13 PROCEDURE — 37000009 HC ANESTHESIA EA ADD 15 MINS: Performed by: SURGERY

## 2017-02-13 PROCEDURE — 8E0W4CZ ROBOTIC ASSISTED PROCEDURE OF TRUNK REGION, PERCUTANEOUS ENDOSCOPIC APPROACH: ICD-10-PCS | Performed by: SURGERY

## 2017-02-13 PROCEDURE — 71000039 HC RECOVERY, EACH ADD'L HOUR: Performed by: SURGERY

## 2017-02-13 PROCEDURE — 63600175 PHARM REV CODE 636 W HCPCS: Performed by: ANESTHESIOLOGY

## 2017-02-13 PROCEDURE — 25000003 PHARM REV CODE 250: Performed by: ANESTHESIOLOGY

## 2017-02-13 PROCEDURE — 71000033 HC RECOVERY, INTIAL HOUR: Performed by: SURGERY

## 2017-02-13 RX ORDER — SUCCINYLCHOLINE CHLORIDE 20 MG/ML
INJECTION INTRAMUSCULAR; INTRAVENOUS
Status: DISCONTINUED | OUTPATIENT
Start: 2017-02-13 | End: 2017-02-13

## 2017-02-13 RX ORDER — HYDROCODONE BITARTRATE AND ACETAMINOPHEN 7.5; 325 MG/15ML; MG/15ML
15 SOLUTION ORAL EVERY 4 HOURS PRN
Status: DISCONTINUED | OUTPATIENT
Start: 2017-02-14 | End: 2017-02-15 | Stop reason: HOSPADM

## 2017-02-13 RX ORDER — LIDOCAINE HCL/PF 100 MG/5ML
SYRINGE (ML) INTRAVENOUS
Status: DISCONTINUED | OUTPATIENT
Start: 2017-02-13 | End: 2017-02-13

## 2017-02-13 RX ORDER — AMITRIPTYLINE HYDROCHLORIDE 50 MG/1
100 TABLET, FILM COATED ORAL NIGHTLY
Status: DISCONTINUED | OUTPATIENT
Start: 2017-02-13 | End: 2017-02-15 | Stop reason: HOSPADM

## 2017-02-13 RX ORDER — BUPIVACAINE HYDROCHLORIDE 2.5 MG/ML
INJECTION, SOLUTION EPIDURAL; INFILTRATION; INTRACAUDAL
Status: DISCONTINUED | OUTPATIENT
Start: 2017-02-13 | End: 2017-02-13 | Stop reason: HOSPADM

## 2017-02-13 RX ORDER — FENTANYL CITRATE 50 UG/ML
INJECTION, SOLUTION INTRAMUSCULAR; INTRAVENOUS
Status: DISCONTINUED | OUTPATIENT
Start: 2017-02-13 | End: 2017-02-13

## 2017-02-13 RX ORDER — GLYCOPYRROLATE 0.2 MG/ML
INJECTION INTRAMUSCULAR; INTRAVENOUS
Status: DISCONTINUED | OUTPATIENT
Start: 2017-02-13 | End: 2017-02-13

## 2017-02-13 RX ORDER — ROCURONIUM BROMIDE 10 MG/ML
INJECTION, SOLUTION INTRAVENOUS
Status: DISCONTINUED | OUTPATIENT
Start: 2017-02-13 | End: 2017-02-13

## 2017-02-13 RX ORDER — GLUCAGON 1 MG
1 KIT INJECTION
Status: DISCONTINUED | OUTPATIENT
Start: 2017-02-13 | End: 2017-02-15 | Stop reason: HOSPADM

## 2017-02-13 RX ORDER — HYDROMORPHONE HYDROCHLORIDE 2 MG/ML
0.2 INJECTION, SOLUTION INTRAMUSCULAR; INTRAVENOUS; SUBCUTANEOUS EVERY 5 MIN PRN
Status: DISCONTINUED | OUTPATIENT
Start: 2017-02-13 | End: 2017-02-14 | Stop reason: HOSPADM

## 2017-02-13 RX ORDER — ONDANSETRON 2 MG/ML
INJECTION INTRAMUSCULAR; INTRAVENOUS
Status: DISCONTINUED | OUTPATIENT
Start: 2017-02-13 | End: 2017-02-13

## 2017-02-13 RX ORDER — ACETAMINOPHEN 10 MG/ML
1000 INJECTION, SOLUTION INTRAVENOUS EVERY 8 HOURS
Status: COMPLETED | OUTPATIENT
Start: 2017-02-13 | End: 2017-02-14

## 2017-02-13 RX ORDER — NEOSTIGMINE METHYLSULFATE 1 MG/ML
INJECTION, SOLUTION INTRAVENOUS
Status: DISCONTINUED | OUTPATIENT
Start: 2017-02-13 | End: 2017-02-13

## 2017-02-13 RX ORDER — SODIUM CHLORIDE, SODIUM LACTATE, POTASSIUM CHLORIDE, CALCIUM CHLORIDE 600; 310; 30; 20 MG/100ML; MG/100ML; MG/100ML; MG/100ML
INJECTION, SOLUTION INTRAVENOUS CONTINUOUS
Status: DISCONTINUED | OUTPATIENT
Start: 2017-02-13 | End: 2017-02-13

## 2017-02-13 RX ORDER — INSULIN ASPART 100 [IU]/ML
0-5 INJECTION, SOLUTION INTRAVENOUS; SUBCUTANEOUS
Status: DISCONTINUED | OUTPATIENT
Start: 2017-02-13 | End: 2017-02-15 | Stop reason: HOSPADM

## 2017-02-13 RX ORDER — PANTOPRAZOLE SODIUM 40 MG/10ML
40 INJECTION, POWDER, LYOPHILIZED, FOR SOLUTION INTRAVENOUS 2 TIMES DAILY
Status: DISCONTINUED | OUTPATIENT
Start: 2017-02-13 | End: 2017-02-15 | Stop reason: HOSPADM

## 2017-02-13 RX ORDER — PROPOFOL 10 MG/ML
VIAL (ML) INTRAVENOUS
Status: DISCONTINUED | OUTPATIENT
Start: 2017-02-13 | End: 2017-02-13

## 2017-02-13 RX ORDER — SODIUM CHLORIDE 9 MG/ML
INJECTION, SOLUTION INTRAVENOUS CONTINUOUS
Status: DISCONTINUED | OUTPATIENT
Start: 2017-02-13 | End: 2017-02-13

## 2017-02-13 RX ORDER — IBUPROFEN 200 MG
24 TABLET ORAL
Status: DISCONTINUED | OUTPATIENT
Start: 2017-02-13 | End: 2017-02-15 | Stop reason: HOSPADM

## 2017-02-13 RX ORDER — SODIUM CHLORIDE 9 MG/ML
INJECTION, SOLUTION INTRAVENOUS CONTINUOUS
Status: DISCONTINUED | OUTPATIENT
Start: 2017-02-13 | End: 2017-02-15 | Stop reason: HOSPADM

## 2017-02-13 RX ORDER — SODIUM CHLORIDE 9 MG/ML
3 INJECTION, SOLUTION INTRAMUSCULAR; INTRAVENOUS; SUBCUTANEOUS
Status: DISCONTINUED | OUTPATIENT
Start: 2017-02-13 | End: 2017-02-14 | Stop reason: HOSPADM

## 2017-02-13 RX ORDER — METOPROLOL SUCCINATE 50 MG/1
50 TABLET, EXTENDED RELEASE ORAL DAILY
Status: DISCONTINUED | OUTPATIENT
Start: 2017-02-14 | End: 2017-02-15 | Stop reason: HOSPADM

## 2017-02-13 RX ORDER — IBUPROFEN 200 MG
16 TABLET ORAL
Status: DISCONTINUED | OUTPATIENT
Start: 2017-02-13 | End: 2017-02-15 | Stop reason: HOSPADM

## 2017-02-13 RX ORDER — FENTANYL CITRATE 50 UG/ML
25 INJECTION, SOLUTION INTRAMUSCULAR; INTRAVENOUS EVERY 5 MIN PRN
Status: DISCONTINUED | OUTPATIENT
Start: 2017-02-13 | End: 2017-02-14 | Stop reason: HOSPADM

## 2017-02-13 RX ORDER — HYDROMORPHONE HYDROCHLORIDE 2 MG/ML
0.5 INJECTION, SOLUTION INTRAMUSCULAR; INTRAVENOUS; SUBCUTANEOUS
Status: DISCONTINUED | OUTPATIENT
Start: 2017-02-13 | End: 2017-02-15 | Stop reason: HOSPADM

## 2017-02-13 RX ORDER — ONDANSETRON 2 MG/ML
4 INJECTION INTRAMUSCULAR; INTRAVENOUS EVERY 6 HOURS PRN
Status: DISCONTINUED | OUTPATIENT
Start: 2017-02-13 | End: 2017-02-15 | Stop reason: HOSPADM

## 2017-02-13 RX ORDER — CEFAZOLIN SODIUM 2 G/50ML
2 SOLUTION INTRAVENOUS
Status: COMPLETED | OUTPATIENT
Start: 2017-02-13 | End: 2017-02-13

## 2017-02-13 RX ORDER — MEPERIDINE HYDROCHLORIDE 50 MG/ML
12.5 INJECTION INTRAMUSCULAR; INTRAVENOUS; SUBCUTANEOUS ONCE AS NEEDED
Status: ACTIVE | OUTPATIENT
Start: 2017-02-13 | End: 2017-02-13

## 2017-02-13 RX ORDER — CEFAZOLIN SODIUM 2 G/50ML
2 SOLUTION INTRAVENOUS ONCE
Status: COMPLETED | OUTPATIENT
Start: 2017-02-13 | End: 2017-02-13

## 2017-02-13 RX ADMIN — HYDROMORPHONE HYDROCHLORIDE 0.5 MG: 2 INJECTION, SOLUTION INTRAMUSCULAR; INTRAVENOUS; SUBCUTANEOUS at 09:02

## 2017-02-13 RX ADMIN — PANTOPRAZOLE SODIUM 40 MG: 40 INJECTION, POWDER, FOR SOLUTION INTRAVENOUS at 10:02

## 2017-02-13 RX ADMIN — ROCURONIUM BROMIDE 10 MG: 10 INJECTION, SOLUTION INTRAVENOUS at 12:02

## 2017-02-13 RX ADMIN — ROCURONIUM BROMIDE 10 MG: 10 INJECTION, SOLUTION INTRAVENOUS at 11:02

## 2017-02-13 RX ADMIN — SODIUM CHLORIDE, SODIUM LACTATE, POTASSIUM CHLORIDE, AND CALCIUM CHLORIDE: 600; 310; 30; 20 INJECTION, SOLUTION INTRAVENOUS at 10:02

## 2017-02-13 RX ADMIN — ROCURONIUM BROMIDE 35 MG: 10 INJECTION, SOLUTION INTRAVENOUS at 10:02

## 2017-02-13 RX ADMIN — CEFAZOLIN SODIUM 2 G: 2 SOLUTION INTRAVENOUS at 11:02

## 2017-02-13 RX ADMIN — ONDANSETRON 4 MG: 2 INJECTION, SOLUTION INTRAMUSCULAR; INTRAVENOUS at 01:02

## 2017-02-13 RX ADMIN — NEOSTIGMINE METHYLSULFATE 4 MG: 1 INJECTION INTRAVENOUS at 01:02

## 2017-02-13 RX ADMIN — GLYCOPYRROLATE 0.6 MG: 0.2 INJECTION, SOLUTION INTRAMUSCULAR; INTRAVENOUS at 01:02

## 2017-02-13 RX ADMIN — CEFAZOLIN SODIUM 2 G: 2 SOLUTION INTRAVENOUS at 10:02

## 2017-02-13 RX ADMIN — PROPOFOL 160 MG: 10 INJECTION, EMULSION INTRAVENOUS at 10:02

## 2017-02-13 RX ADMIN — ROCURONIUM BROMIDE 5 MG: 10 INJECTION, SOLUTION INTRAVENOUS at 10:02

## 2017-02-13 RX ADMIN — ONDANSETRON 4 MG: 2 INJECTION INTRAMUSCULAR; INTRAVENOUS at 05:02

## 2017-02-13 RX ADMIN — SODIUM CHLORIDE: 0.9 INJECTION, SOLUTION INTRAVENOUS at 04:02

## 2017-02-13 RX ADMIN — SUCCINYLCHOLINE CHLORIDE 100 MG: 20 INJECTION, SOLUTION INTRAMUSCULAR; INTRAVENOUS at 10:02

## 2017-02-13 RX ADMIN — ACETAMINOPHEN 1000 MG: 10 INJECTION, SOLUTION INTRAVENOUS at 09:02

## 2017-02-13 RX ADMIN — FENTANYL CITRATE 25 MCG: 50 INJECTION INTRAMUSCULAR; INTRAVENOUS at 03:02

## 2017-02-13 RX ADMIN — BUPIVACAINE HYDROCHLORIDE 30 ML: 2.5 INJECTION, SOLUTION EPIDURAL; INFILTRATION; INTRACAUDAL; PERINEURAL at 11:02

## 2017-02-13 RX ADMIN — FENTANYL CITRATE 100 MCG: 50 INJECTION, SOLUTION INTRAMUSCULAR; INTRAVENOUS at 10:02

## 2017-02-13 RX ADMIN — LIDOCAINE HYDROCHLORIDE 40 MG: 20 INJECTION, SOLUTION INTRAVENOUS at 10:02

## 2017-02-13 RX ADMIN — SODIUM CHLORIDE, SODIUM LACTATE, POTASSIUM CHLORIDE, AND CALCIUM CHLORIDE: 600; 310; 30; 20 INJECTION, SOLUTION INTRAVENOUS at 12:02

## 2017-02-13 NOTE — ANESTHESIA PREPROCEDURE EVALUATION
02/13/2017  Cassandra Campos is a 66 y.o., female.    [facility] Anesthesia Evaluation    I have reviewed the Patient Summary Reports.    I have reviewed the Nursing Notes.   I have reviewed the Medications.     Review of Systems  Anesthesia Hx:  No problems with previous Anesthesia  Denies Family Hx of Anesthesia complications.   Denies Personal Hx of Anesthesia complications.   Social:  Former Smoker    Hematology/Oncology:  Hematology Normal        EENT/Dental:EENT/Dental Normal   Cardiovascular:   Hypertension    Pulmonary:   COPD, mild Sleep Apnea    Renal/:  Renal/ Normal     Hepatic/GI:   GERD    Neurological:   Headaches    Endocrine:   Diabetes, type 2        Physical Exam   Airway/Jaw/Neck:  Airway Findings: General Airway Assessment: Adult Mallampati: II  Improves to II with phonation.  TM Distance: Normal, at least 6 cm       Chest/Lungs:  Chest/Lungs Findings: Clear to auscultation, Normal Respiratory Rate     Heart/Vascular:  Heart Findings: Rate: Normal  Rhythm: Regular Rhythm             Anesthesia Plan  Type of Anesthesia, risks & benefits discussed:  Anesthesia Type:  general  Patient's Preference:   Intra-op Monitoring Plan:   Intra-op Monitoring Plan Comments:   Post Op Pain Control Plan:   Post Op Pain Control Plan Comments:   Induction:    Beta Blocker:  Patient is not currently on a Beta-Blocker (No further documentation required).       Informed Consent: Patient understands risks and agrees with Anesthesia plan.  Questions answered. Anesthesia consent signed with patient.  ASA Score: 2     Day of Surgery Review of History & Physical:    H&P update referred to the surgeon.         Ready For Surgery From Anesthesia Perspective.

## 2017-02-13 NOTE — TRANSFER OF CARE
"Anesthesia Transfer of Care Note    Patient: Cassandra Campos    Procedure(s) Performed: Procedure(s) (LRB):  XI ROBOT ASSISTED LAPAROSCOPIC SLEEVE-GASTRECTOMY (N/A)    Patient location: PACU    Anesthesia Type: general    Transport from OR: Transported from OR on room air with adequate spontaneous ventilation    Post pain: adequate analgesia    Post assessment: no apparent anesthetic complications    Post vital signs: stable    Level of consciousness: responds to stimulation    Nausea/Vomiting: no nausea/vomiting    Complications: none          Last vitals:   Visit Vitals    /69    Pulse 102    Temp 37.4 °C (99.3 °F) (Temporal)    Resp 12    Ht 5' 3" (1.6 m)    Wt 89.5 kg (197 lb 5 oz)    SpO2 (!) 87%    Breastfeeding No    BMI 34.95 kg/m2     "

## 2017-02-13 NOTE — ANESTHESIA RELEASE NOTE
"Anesthesia Release from PACU Note    Patient: Cassandra Campos    Procedure(s) Performed: Procedure(s) (LRB):  XI ROBOT ASSISTED LAPAROSCOPIC SLEEVE-GASTRECTOMY (N/A)    Anesthesia type: general    Post pain: Adequate analgesia    Post assessment: no apparent anesthetic complications, tolerated procedure well and no evidence of recall    Last Vitals:   Visit Vitals    /69    Pulse 102    Temp 37.4 °C (99.3 °F) (Temporal)    Resp 12    Ht 5' 3" (1.6 m)    Wt 89.5 kg (197 lb 5 oz)    SpO2 (!) 87%    Breastfeeding No    BMI 34.95 kg/m2       Post vital signs: stable    Level of consciousness: responds to stimulation    Nausea/Vomiting: no nausea/no vomiting    Complications: none    Airway Patency: patent    Respiratory: unassisted    Cardiovascular: stable and blood pressure at baseline    Hydration: euvolemic     "

## 2017-02-13 NOTE — OP NOTE
"Operative Note       SURGERY DATE:  02/13/2017    PRE-OP DIAGNOSIS:  Morbid obesity due to excess calories [E66.01]    POST-OP DIAGNOSIS:  Morbid obesity due to excess calories [E66.01]    Procedure(s) (LRB):  XI ROBOT ASSISTED LAPAROSCOPIC SLEEVE-GASTRECTOMY (N/A)    Surgeon(s) and Role:     * Rashaad Watson MD - Primary    ASSISTANTS: None  ANESTHESIA: General    FINDINGS: Unremarkable with hepatomegaly.    ESTIMATED BLOOD LOSS: 10 mL              COMPLICATIONS:  None    SPECIMEN:  gastric antrum    Implants: None    INDICATION:  Morbid obesity    DESCRIPTION OF PROCEDURE:    The patient was taken to the operating room and under went general anesthesia with orotracheal intubation. Once the patient was fully sedated, the patient was prepared and draped in the sterile manner. Once the patient was sleep, a "time-out" was called, the patient's ID, the site of surgery, the names of participants, and the planned surgery were confirmed prior to making the incision. Preoperative upper endoscopic examination was completed and visualized all the way to the duodenum second portion. Then attention was turned to the abdomen. A Verres needle was placed on the left upper quadrant, and gained pneumoperitoneum to the pressure of 15 mm Hg. Then in a straight line and 10 cm apart from each other, three 8 mm metal trocars placed above the umbilicus. The camera port was placed slightly left of the midline. A 12 mm metal trocar was then placed on the far right of the straight line. With the patient in a steep reverse trendelenburg position, the stomach was examined. A Eriberto's retractor was placed at the epigastric area after a tract was created with a 5 mm trocar. The tip of the trocar was then drove into the peritoneal cavity, rotated, and then left lobe was lifted. The retractor was then connected to the fast clamp. Once docking of robot completed, then instruments (from far right; bioplar, camera, vessel sealer, and Cardiere) were " loaded. The dissection was commenced from the 5 cm cornell proximally from the pylorus, and thus taking down the omentum, short gastric vessels leading all the way up to the angle of His. During dissection, the posterior attachments were lysed with the vessel sealer. Once the stomach was dissected on the greater curvature, then a stapler was brought in with a Green load, then 48 bougie was inserted into the stomach. With the bougie in place, the stapler was fired twice with Green, and Blue loads the rest of the way all the way up to the angle of His. The staple line was then suture closed with 2-0 Stratifix, imbricating the staple line. Small bleeders also were coagulated with a cautery. After bougie was removed, EGD was performed to check for possible leak. The integrity of the staple line was very goo. Irrigation was then performed and removed irrigant. After the resected stomach removed from the peritoneal cavity, then the robot was undocked. All ports removed, and injected with 30 ml of 0.25% Marcaine, then closed with 4-0 Vicryl. During the procedure, the EBL was minimal. The patient was later awaken, extubated and taken to the recovery room.              CONDITION: Good    DISPOSITION: PACU - hemodynamically stable.     Rashaad Watson

## 2017-02-13 NOTE — IP AVS SNAPSHOT
[facility]  [id number] [place] [address] [address] [place] [address]           Patient Discharge Instructions     Our goal is to set you up for success. This packet includes information on your condition, medications, and your home care. It will help you to care for yourself so you don't get sicker and need to go back to the hospital.     Please ask your nurse if you have any questions.        There are many details to remember when preparing to leave the hospital. Here is what you will need to do:    1. Take your medicine. If you are prescribed medications, review your Medication List in the following pages. You may have new medications to  at the pharmacy and others that you'll need to stop taking. Review the instructions for how and when to take your medications. Talk with your doctor or nurses if you are unsure of what to do.     2. Go to your follow-up appointments. Specific follow-up information is listed in the following pages. Your may be contacted by a transition nurse or clinical provider about future appointments. Be sure we have all of the phone numbers to reach you, if needed. Please contact your provider's office if you are unable to make an appointment.     3. Watch for warning signs. Your doctor or nurse will give you detailed warning signs to watch for and when to call for assistance. These instructions may also include educational information about your condition. If you experience any of warning signs to your health, call your doctor.               ** Verify the list of medication(s) below is accurate and up to date. Carry this with you in case of emergency. If your medications have changed, please notify your healthcare provider.             Medication List      START taking these medications        Additional Info                      ondansetron 4 MG Jayshree   Commonly known as:  ZOFRAN-ODT   Quantity:  15 tablet   Refills:  0   Dose:  4 mg    Instructions:  Take 1 tablet (4 mg  total) by mouth every 8 (eight) hours as needed.     Begin Date    AM    Noon    PM    Bedtime         CHANGE how you take these medications        Additional Info                      * hydrocodone-acetaminophen 7.5-325mg 7.5-325 mg per tablet   Commonly known as:  NORCO   Quantity:  15 tablet   Refills:  0   Dose:  1 tablet   What changed:  Another medication with the same name was added. Make sure you understand how and when to take each.    Instructions:  Take 1 tablet by mouth daily as needed for Pain.     Begin Date    AM    Noon    PM    Bedtime       * hydrocodone-apap 2.5-108 MG/5 ML oral solution   Commonly known as:  HYCET   Quantity:  473 mL   Refills:  0   Dose:  15 mL   What changed:  You were already taking a medication with the same name, and this prescription was added. Make sure you understand how and when to take each.    Last time this was given:  15 mLs on 2/15/2017  4:29 AM   Instructions:  Take 15 mLs by mouth every 4 (four) hours as needed.     Begin Date    AM    Noon    PM    Bedtime       metoprolol succinate 50 MG 24 hr tablet   Commonly known as:  TOPROL-XL   Quantity:  90 tablet   Refills:  1   Dose:  50 mg   What changed:  when to take this    Last time this was given:  50 mg on 2/15/2017  9:20 AM   Instructions:  Take 1 tablet (50 mg total) by mouth once daily.     Begin Date    AM    Noon    PM    Bedtime       omeprazole 20 MG capsule   Commonly known as:  PRILOSEC   Quantity:  90 capsule   Refills:  1   Dose:  20 mg   What changed:  when to take this    Instructions:  Take 1 capsule (20 mg total) by mouth once daily.     Begin Date    AM    Noon    PM    Bedtime       pravastatin 40 MG tablet   Commonly known as:  PRAVACHOL   Quantity:  30 tablet   Refills:  0   What changed:    - how much to take  - how to take this  - when to take this  - additional instructions    Instructions:  TAKE 1 TABLET ONE TIME DAILY     Begin Date    AM    Noon    PM    Bedtime       * Notice:  This list  has 2 medication(s) that are the same as other medications prescribed for you. Read the directions carefully, and ask your doctor or other care provider to review them with you.      CONTINUE taking these medications        Additional Info                      ACCU-CHEK FASTCLIX Misc   Quantity:  100 each   Refills:  3   Generic drug:  lancets    Instructions:  USE ONE TIME DAILY     Begin Date    AM    Noon    PM    Bedtime       amitriptyline 100 MG tablet   Commonly known as:  ELAVIL   Quantity:  90 tablet   Refills:  0    Last time this was given:  100 mg on 2/14/2017 10:05 PM   Instructions:  TAKE 1 TABLET (100 MG TOTAL) BY MOUTH NIGHTLY.     Begin Date    AM    Noon    PM    Bedtime       aspirin 81 MG EC tablet   Commonly known as:  ECOTRIN   Refills:  0   Dose:  81 mg    Instructions:  81 mg once daily.     Begin Date    AM    Noon    PM    Bedtime       blood sugar diagnostic Strp   Commonly known as:  ACCU-CHEK SMARTVIEW TEST STRIP   Quantity:  100 strip   Refills:  0    Instructions:  Use to test once daily     Begin Date    AM    Noon    PM    Bedtime       * blood-glucose meter Misc   Quantity:  1 each   Refills:  0   Dose:  1 each   Comments:  Pt requests Accuchek Saadia replacement - it is not working after changing battery.    Instructions:  1 each by Misc.(Non-Drug; Combo Route) route once daily.     Begin Date    AM    Noon    PM    Bedtime       * blood-glucose meter kit   Quantity:  1 each   Refills:  0   Comments:  accu-chek laya connect meter     Instructions:  Use as instructed     Begin Date    AM    Noon    PM    Bedtime       eszopiclone 3 mg Tab   Commonly known as:  LUNESTA   Quantity:  90 tablet   Refills:  0   Dose:  3 mg   Comments:  This replaces the 90 day rx sent to local [facility] in error    Instructions:  Take 1 tablet (3 mg total) by mouth nightly as needed.     Begin Date    AM    Noon    PM    Bedtime       metformin 500 MG 24 hr tablet   Commonly known as:  GLUCOPHAGE-XR   Quantity:   270 tablet   Refills:  0    Instructions:  TAKE 3 TABLETS ONE TIME DAILY     Begin Date    AM    Noon    PM    Bedtime       mupirocin 2 % ointment   Commonly known as:  BACTROBAN   Refills:  0    Instructions:  Apply topically.     Begin Date    AM    Noon    PM    Bedtime       nabumetone 500 MG tablet   Commonly known as:  RELAFEN   Refills:  0   Dose:  500 mg    Instructions:  Take 500 mg by mouth once daily.     Begin Date    AM    Noon    PM    Bedtime       VENTOLIN HFA INHL   Refills:  0   Dose:  1 puff    Instructions:  Inhale 1 puff into the lungs every 4 (four) hours as needed.     Begin Date    AM    Noon    PM    Bedtime       * Notice:  This list has 2 medication(s) that are the same as other medications prescribed for you. Read the directions carefully, and ask your doctor or other care provider to review them with you.         Where to Get Your Medications      These medications were sent to [facility]/pharmacy #[id number] - [place] - [address] [facility]  [address] [address]     Phone:  [phone]     hydrocodone-apap 2.5-108 MG/5 ML oral solution    ondansetron 4 MG Tbdl                  Please bring to all follow up appointments:    1. A copy of your discharge instructions.  2. All medicines you are currently taking in their original bottles.  3. Identification and insurance card.    Please arrive 15 minutes ahead of scheduled appointment time.    Please call 24 hours in advance if you must reschedule your appointment and/or time.        Your Scheduled Appointments     Mar 07, 2017  1:00 PM CST   Post OP with Tsering Bazzi PA-C   [facility] - General Surgery ([facility])    [address]  [place] [id number] [phone]            Jun 29, 2017  9:40 AM CDT   Established Patient Visit with Denia Maldonado MD   [facility] ([facility]    [address] [place] [address] [phone]              Follow-up Information     Follow up  "with Rashaad Watson MD.    Specialty:  General Surgery    Contact information:    [address]  [place] [address]  [phone]          Discharge Instructions     Future Orders    Call MD for:  difficulty breathing or increased cough     Call MD for:  increased confusion or weakness     Call MD for:  persistent dizziness, light-headedness, or visual disturbances     Call MD for:  persistent nausea and vomiting or diarrhea     Call MD for:  redness, tenderness, or signs of infection (pain, swelling, redness, odor or green/yellow discharge around incision site)     Call MD for:  severe uncontrolled pain     Call MD for:  temperature >100.4     Lifting restrictions     Comments:    Avoid lifting over 20lbs    No dressing needed       Discharge References/Attachments     BARIATRIC (OBESITY) SURGERY, DISCHARGE INSTRUCTIONS FOR (ENGLISH)    ACETAMINOPHEN; HYDROCODONE TABLETS OR CAPSULES (ENGLISH)        Primary Diagnosis     Your primary diagnosis was:  Severe Obesity      Admission Information     Date & Time Provider Department CSN    2/13/2017  7:50 AM Rashaad Watson MD [facility] -  [id number]      Care Providers     Provider Role Specialty Primary office phone    Rashaad Watson MD Attending Provider General Surgery [phone]    Rashaad Watson MD Surgeon  General Surgery [phone]      Your Vitals Were     BP Pulse Temp Resp Height Weight    102/63 (BP Location: Right arm, Patient Position: Lying, BP Method: Automatic) 94 97.8 °F (36.6 °C) (Oral) 18 5' 3" (1.6 m) 89.5 kg (197 lb 5 oz)    SpO2 BMI             98% 34.95 kg/m2         Recent Lab Values        5/21/2014 9/2/2014 1/20/2015 6/4/2015 12/22/2015 6/15/2016 11/28/2016 2/13/2017      8:34 AM 10:41 AM  1:17 PM 10:30 AM  9:46 AM  8:53 AM  2:09 PM  4:15 PM    A1C 6.7 (H) 6.8 (H) 6.9 (H) 6.6 (H) 6.7 (H) 6.6 (H) 7.4 (H) 6.3 (H)    Comment for A1C at  2:09 PM on 11/28/2016:  According to ADA guidelines, hemoglobin A1C <7.0% represents  optimal control in " non-pregnant diabetic patients.  Different  metrics may apply to specific populations.   Standards of Medical Care in Diabetes - 2016.  For the purpose of screening for the presence of diabetes:  <5.7%     Consistent with the absence of diabetes  5.7-6.4%  Consistent with increasing risk for diabetes   (prediabetes)  >or=6.5%  Consistent with diabetes  Currently no consensus exists for use of hemoglobin A1C  for diagnosis of diabetes for children.      Comment for A1C at  4:15 PM on 2/13/2017:  According to ADA guidelines, hemoglobin A1C <7.0% represents  optimal control in non-pregnant diabetic patients.  Different  metrics may apply to specific populations.   Standards of Medical Care in Diabetes - 2016.  For the purpose of screening for the presence of diabetes:  <5.7%     Consistent with the absence of diabetes  5.7-6.4%  Consistent with increasing risk for diabetes   (prediabetes)  >or=6.5%  Consistent with diabetes  Currently no consensus exists for use of hemoglobin A1C  for diagnosis of diabetes for children.        Pending Labs     Order Current Status    Specimen to Pathology - Surgery In process      Allergies as of 2/15/2017     No Known Allergies      [facility]     [facility] Nurse Care Line - 24/7 Assistance  Unless otherwise directed by your provider, please contact [facility], our nurse care line that is available for 24/7 assistance.     Registered nurses in the [facility] provide clinical advisement, health education, appointment booking, and other advisory services.  Call for this free service at [phone].        Advance Directives     An advance directive is a document which, in the event you are no longer able to make decisions for yourself, tells your healthcare team what kind of treatment you do or do not want to receive, or who you would like to make those decisions for you.  If you do not currently have an advance directive, [facility] encourages you to create  one.  For more information call:  [phone]), [phone] ([phone]),  or log on to [email or web address].        Smoking Cessation     If you would like to quit smoking:   You may be eligible for free services if you are a [place] resident and started smoking cigarettes before September 1, 1988.  Call the Smoking Cessation Trust (SCT) toll free at (548) 210-2186 or (782) 818-1820.   Call 4-622-QUIT-NOW if you do not meet the above criteria.            Language Assistance Services     ATTENTION: Language assistance services are available, free of charge. Please call [phone].      ATENCIÓN: Si dasiala yanelis, tiene a machuca disposición servicios gratuitos de asistencia lingüística. Llame al [phone].     CHÚ Ý: N?u b?n nói Ti?ng Vi?t, có các d?ch v? h? tr? ngôn ng? mi?n phí dành cho b?n. G?i s? [phone].        Diabetes Discharge Instructions                                    [facility] -  complies with applicable Federal civil rights laws and does not discriminate on the basis of race, color, national origin, age, disability, or sex.

## 2017-02-13 NOTE — H&P
History & Physical    SUBJECTIVE:     History of Present Illness:  Patient is a 66 y.o. female presents with morbid obesity with BMI of 36.9 kg/m².  She did undergo complete and thorough evaluation including preoperative laboratory workups.  She also had dietary as well as psychosocial evaluation consultations.  She has been approved for bariatric surgery.  We have discussed the options and we plan to proceed with robotic-assisted laparoscopic sleeve gastrectomy.  Upon completion of her upper endoscopic examination, she is expected to proceed with the surgery unless there are contraindications.  She has past surgical history of laparoscopic cholecystectomy.    No chief complaint on file.      Review of patient's allergies indicates:  No Known Allergies    Current Facility-Administered Medications   Medication Dose Route Frequency Provider Last Rate Last Dose    0.9%  NaCl infusion   Intravenous Continuous Rashaad Watson MD        cefazolin (ANCEF) 2 gram in dextrose 5% 50 mL IVPB (premix)  2 g Intravenous On Call Procedure Rashaad Watson MD        lactated ringers infusion   Intravenous Continuous Rehana Mayfield MD           Past Medical History   Diagnosis Date    Borderline glaucoma     COPD (chronic obstructive pulmonary disease)      pt states she does not have copd    Diabetes mellitus, type 2     Gastritis     Hydradenitis     Hyperlipidemia     Hypertension     Insomnia     Migraines 02/01/2000    Nasal septum perforation     Sleep apnea     SVT (supraventricular tachycardia) 09/2013    Type II or unspecified type diabetes mellitus without mention of complication, not stated as uncontrolled 05/20/2013     Past Surgical History   Procedure Laterality Date    Cholecystectomy  2/2014    Tonsillectomy, adenoidectomy      Carpal tunnel release       bilateral    Cataract extraction       OU    Axillary hidradenitis excision      Breast lumpectomy Bilateral 07/1998     Benign    Trigger  "finger release Right 2015     Dr. Pedraza    Cyst removal  2015     sebaceous cyst removed from face     section       Family History   Problem Relation Age of Onset    Prostate cancer Brother     Diabetes Maternal Aunt      Social History   Substance Use Topics    Smoking status: Former Smoker     Packs/day: 0.50     Years: 30.00     Types: Cigarettes     Start date: 2012    Smokeless tobacco: Never Used    Alcohol use Yes      Comment: rarely // wine  No alcohol prior to surgery        Review of Systems:  Review of Systems   Constitutional: Positive for activity change and appetite change. Negative for chills, fever and unexpected weight change.   Respiratory: Negative.    Cardiovascular: Negative.    Gastrointestinal: Negative.    Endocrine: Negative.    Genitourinary: Negative.    Musculoskeletal: Negative.    Psychiatric/Behavioral: Negative.        OBJECTIVE:     Vital Signs (Most Recent)  Temp: 98 °F (36.7 °C) (17)  Pulse: 99 (17)  Resp: 16 (17)  BP: 137/79 (17)  SpO2: (!) 94 % (17)  5' 3" (1.6 m)  89.5 kg (197 lb 5 oz)     Physical Exam:  Physical Exam   Constitutional: She appears well-developed and well-nourished.   Morbidly obese   HENT:   Head: Normocephalic.   Eyes: Pupils are equal, round, and reactive to light.   Neck: Normal range of motion. Neck supple.   Cardiovascular: Normal rate, regular rhythm, normal heart sounds and intact distal pulses.    Pulmonary/Chest: Effort normal and breath sounds normal.   Abdominal: Soft. Bowel sounds are normal.   Morbidly obese abdomen with well-healed scars from previous gallbladder surgery.   Musculoskeletal: Normal range of motion.   Skin: Skin is warm.   Psychiatric: She has a normal mood and affect.   Vitals reviewed.      Laboratory  Lab Results   Component Value Date    WBC 8.44 2017    HGB 12.9 2017    HCT 38.4 2017     2017    CHOL 170 " 11/28/2016    TRIG 208 (H) 11/28/2016    HDL 30 (L) 11/28/2016    ALT 40 01/27/2017    AST 24 01/27/2017     01/27/2017    K 4.2 01/27/2017     01/27/2017    CREATININE 0.8 01/27/2017    BUN 13 01/27/2017    CO2 26 01/27/2017    TSH 2.386 11/28/2016    HGBA1C 7.4 (H) 11/28/2016       No results found for this or any previous visit.      Diagnostic Results:  Labs: Reviewed  ECG: Reviewed  X-Ray: Reviewed    None    ASSESSMENT/PLAN:     Morbid obesity with BMI of 36.90 kg/m².    PLAN:Plan     Robotic-assisted laparoscopic sleeve gastrectomy.  The risk and the benefit of the surgery was fully discussed with the patient.  Patient will initially undergo upper endoscopic examination on February 8 of 2017.  The surgery will follow on February 13 of 2017 at 10:30 AM. No changes noted since I have seen the patient.    Rashaad Watson

## 2017-02-13 NOTE — PLAN OF CARE
Patient prepared for surgery. Patient request that her family not be informed of what surgery she is having. Note placed on the chart to maintain patient confidentiality. Patient verbalized understanding.

## 2017-02-14 LAB
ANION GAP SERPL CALC-SCNC: 11 MMOL/L
BASOPHILS # BLD AUTO: 0.03 K/UL
BASOPHILS NFR BLD: 0.3 %
BUN SERPL-MCNC: 6 MG/DL
CALCIUM SERPL-MCNC: 8.7 MG/DL
CHLORIDE SERPL-SCNC: 103 MMOL/L
CO2 SERPL-SCNC: 25 MMOL/L
CREAT SERPL-MCNC: 0.6 MG/DL
DIFFERENTIAL METHOD: ABNORMAL
EOSINOPHIL # BLD AUTO: 0.1 K/UL
EOSINOPHIL NFR BLD: 0.5 %
ERYTHROCYTE [DISTWIDTH] IN BLOOD BY AUTOMATED COUNT: 14.7 %
EST. GFR  (AFRICAN AMERICAN): >60 ML/MIN/1.73 M^2
EST. GFR  (NON AFRICAN AMERICAN): >60 ML/MIN/1.73 M^2
GLUCOSE SERPL-MCNC: 121 MG/DL
HCT VFR BLD AUTO: 35.6 %
HGB BLD-MCNC: 12.4 G/DL
LYMPHOCYTES # BLD AUTO: 3.2 K/UL
LYMPHOCYTES NFR BLD: 28.8 %
MAGNESIUM SERPL-MCNC: 1.6 MG/DL
MCH RBC QN AUTO: 25.2 PG
MCHC RBC AUTO-ENTMCNC: 34.8 %
MCV RBC AUTO: 72 FL
MONOCYTES # BLD AUTO: 0.8 K/UL
MONOCYTES NFR BLD: 7 %
NEUTROPHILS # BLD AUTO: 7 K/UL
NEUTROPHILS NFR BLD: 63.6 %
PHOSPHATE SERPL-MCNC: 2.4 MG/DL
PLATELET # BLD AUTO: 233 K/UL
PLATELET BLD QL SMEAR: ABNORMAL
PMV BLD AUTO: 9.5 FL
POCT GLUCOSE: 121 MG/DL (ref 70–110)
POCT GLUCOSE: 126 MG/DL (ref 70–110)
POCT GLUCOSE: 127 MG/DL (ref 70–110)
POIKILOCYTOSIS BLD QL SMEAR: SLIGHT
POLYCHROMASIA BLD QL SMEAR: ABNORMAL
POTASSIUM SERPL-SCNC: 3.2 MMOL/L
RBC # BLD AUTO: 4.92 M/UL
SODIUM SERPL-SCNC: 139 MMOL/L
STOMATOCYTES BLD QL SMEAR: PRESENT
TARGETS BLD QL SMEAR: ABNORMAL
WBC # BLD AUTO: 11.1 K/UL

## 2017-02-14 PROCEDURE — 63600175 PHARM REV CODE 636 W HCPCS: Performed by: SURGERY

## 2017-02-14 PROCEDURE — 25000003 PHARM REV CODE 250: Performed by: SURGERY

## 2017-02-14 PROCEDURE — C9113 INJ PANTOPRAZOLE SODIUM, VIA: HCPCS | Performed by: SURGERY

## 2017-02-14 PROCEDURE — 83735 ASSAY OF MAGNESIUM: CPT

## 2017-02-14 PROCEDURE — 84100 ASSAY OF PHOSPHORUS: CPT

## 2017-02-14 PROCEDURE — 85025 COMPLETE CBC W/AUTO DIFF WBC: CPT

## 2017-02-14 PROCEDURE — 99024 POSTOP FOLLOW-UP VISIT: CPT | Mod: ,,, | Performed by: SURGERY

## 2017-02-14 PROCEDURE — 11000001 HC ACUTE MED/SURG PRIVATE ROOM

## 2017-02-14 PROCEDURE — 80048 BASIC METABOLIC PNL TOTAL CA: CPT

## 2017-02-14 RX ADMIN — AMITRIPTYLINE HYDROCHLORIDE 100 MG: 50 TABLET, FILM COATED ORAL at 10:02

## 2017-02-14 RX ADMIN — SODIUM CHLORIDE: 0.9 INJECTION, SOLUTION INTRAVENOUS at 12:02

## 2017-02-14 RX ADMIN — HYDROMORPHONE HYDROCHLORIDE 0.5 MG: 2 INJECTION, SOLUTION INTRAMUSCULAR; INTRAVENOUS; SUBCUTANEOUS at 01:02

## 2017-02-14 RX ADMIN — SODIUM CHLORIDE: 0.9 INJECTION, SOLUTION INTRAVENOUS at 04:02

## 2017-02-14 RX ADMIN — HYDROCODONE BITARTRATE AND ACETAMINOPHEN 15 ML: 7.5; 325 SOLUTION ORAL at 10:02

## 2017-02-14 RX ADMIN — METOPROLOL SUCCINATE 50 MG: 25 TABLET, FILM COATED, EXTENDED RELEASE ORAL at 07:02

## 2017-02-14 RX ADMIN — PANTOPRAZOLE SODIUM 40 MG: 40 INJECTION, POWDER, FOR SOLUTION INTRAVENOUS at 11:02

## 2017-02-14 RX ADMIN — ACETAMINOPHEN 1000 MG: 10 INJECTION, SOLUTION INTRAVENOUS at 05:02

## 2017-02-14 RX ADMIN — ACETAMINOPHEN 1000 MG: 10 INJECTION, SOLUTION INTRAVENOUS at 01:02

## 2017-02-14 RX ADMIN — HYDROMORPHONE HYDROCHLORIDE 0.5 MG: 2 INJECTION, SOLUTION INTRAMUSCULAR; INTRAVENOUS; SUBCUTANEOUS at 08:02

## 2017-02-14 RX ADMIN — PANTOPRAZOLE SODIUM 40 MG: 40 INJECTION, POWDER, FOR SOLUTION INTRAVENOUS at 08:02

## 2017-02-14 RX ADMIN — SODIUM CHLORIDE: 0.9 INJECTION, SOLUTION INTRAVENOUS at 07:02

## 2017-02-14 RX ADMIN — HYDROMORPHONE HYDROCHLORIDE 0.5 MG: 2 INJECTION, SOLUTION INTRAMUSCULAR; INTRAVENOUS; SUBCUTANEOUS at 04:02

## 2017-02-14 NOTE — PLAN OF CARE
Pt doing well. Vital signs stable. Pt able to maintain sats on 2l nasal cannula while asleep. Adequately recovered from anesthesia. Pt ambulating well to bathroom. Voiding very well. Clear yellow urine noted. Pt pain well controlled with dilaudid and ofirmev. cbg <200. No coverage needed. Pt was very upset early evening r/t room not available tonight. Pt was very uncomfortable, pacing the room and refusing to get back in the stretcher saying richi been in it too long. She wanted to call somebody to listen. Supervisor celso vann rn came to bedside and spoke with pt. She also went to 5th floor and obtained a bed. After pt in bed and dilaudid initiated, pt calmed down and reported excellent pain relief and she was feeling much better. Will continue to monitor until room available or change of shift.

## 2017-02-14 NOTE — PROGRESS NOTES
Dr duarte notified pt c/o pain 10/10. ofirmev ord and given. Next dose scheduled for 10pm. Pt oral solution to start in am but also unable to give due to tylenol component. Order received for dilaudid 0.5 mg ivp q 3hr for severe pain 7-10/10. Will continue to monitor.

## 2017-02-14 NOTE — PROGRESS NOTES
Dr duarte notified pt not ordered post op antibiotic. Pt also has metoprolol to start in am and pt is strict npo tonight. Pt has hx of svt, htn, copd, sleep apnea. Pt unclear as to when she last took metoprolol. Currently heart rate 102-104/min and temp 99.1. Orders received to hold metoprolol tonight and start in am and give 2 gms ancef now.

## 2017-02-14 NOTE — TREATMENT PLAN
Pt lying in bed sleeping. No signs/symptoms of pain or distress. Family called to check on pt. Family given update.

## 2017-02-14 NOTE — PLAN OF CARE
Problem: Patient Care Overview  Goal: Plan of Care Review  Outcome: Ongoing (interventions implemented as appropriate)  Pt injury free this shift. Pain controlled. IVF infusing as ordered. Tolerating clear liquids. Ambulating independently. VSS. Chart check complete. Will monitor.

## 2017-02-14 NOTE — PLAN OF CARE
Pt resting on stretcher awaiting room assignment. Denies pain at present. Pt dosing and and off. Pt's sisters called to bedside to sit with pt while awaiting a room assignment. VSS. Will cont to monitor.

## 2017-02-14 NOTE — ANESTHESIA POSTPROCEDURE EVALUATION
"Anesthesia Post Evaluation    Patient: Cassandra Campos    Procedure(s) Performed: Procedure(s) (LRB):  XI ROBOT ASSISTED LAPAROSCOPIC SLEEVE-GASTRECTOMY (N/A)    Final Anesthesia Type: general  Patient location during evaluation: PACU  Patient participation: Yes- Able to Participate  Level of consciousness: awake and alert  Post-procedure vital signs: reviewed and stable  Pain management: adequate  Airway patency: patent  PONV status at discharge: No PONV  Anesthetic complications: no      Cardiovascular status: blood pressure returned to baseline  Respiratory status: unassisted and spontaneous ventilation  Hydration status: euvolemic  Follow-up not needed.        Visit Vitals    BP (!) 168/85    Pulse 94    Temp 37 °C (98.6 °F) (Temporal)    Resp 14    Ht 5' 3" (1.6 m)    Wt 89.5 kg (197 lb 5 oz)    SpO2 96%    Breastfeeding No    BMI 34.95 kg/m2       Pain/Francheska Score: Pain Assessment Performed: Yes (2/13/2017  8:10 AM)  Presence of Pain: non-verbal indicators absent (2/13/2017  4:00 PM)  Pain Rating Prior to Med Admin: 7 (2/13/2017  3:25 PM)  Francheska Score: 8 (2/13/2017  4:00 PM)      "

## 2017-02-14 NOTE — PROGRESS NOTES
General Surgery Progress Note    SUBJECTIVE:    No complaints.  Tolerating sips of clear liquids, pain controlled.      OBJECTIVE:    Vital signs and intake/output reviewed.     Physical Exam:   General: no distress   Abdomen: appropriately tender to palpation, no peritoneal signs  Incisions:  Clean, dry, intact    Labs and images reviewed.    ASSESSMENT/PLAN:    Active Hospital Problems    Diagnosis  POA    Morbid obesity due to excess calories [E66.01]  Yes      Resolved Hospital Problems    Diagnosis Date Resolved POA   No resolved problems to display.      Continue sips of clears, ambulate, incentive spirometer, and DVT prophylaxis.

## 2017-02-15 VITALS
HEIGHT: 63 IN | DIASTOLIC BLOOD PRESSURE: 63 MMHG | SYSTOLIC BLOOD PRESSURE: 102 MMHG | OXYGEN SATURATION: 98 % | HEART RATE: 94 BPM | RESPIRATION RATE: 18 BRPM | BODY MASS INDEX: 34.96 KG/M2 | WEIGHT: 197.31 LBS | TEMPERATURE: 98 F

## 2017-02-15 LAB
ESTIMATED AVG GLUCOSE: 134 MG/DL
HBA1C MFR BLD HPLC: 6.3 %
POCT GLUCOSE: 113 MG/DL (ref 70–110)
POCT GLUCOSE: 126 MG/DL (ref 70–110)

## 2017-02-15 PROCEDURE — 27000221 HC OXYGEN, UP TO 24 HOURS

## 2017-02-15 PROCEDURE — 63600175 PHARM REV CODE 636 W HCPCS: Performed by: SURGERY

## 2017-02-15 PROCEDURE — 25000003 PHARM REV CODE 250: Performed by: SURGERY

## 2017-02-15 PROCEDURE — C9113 INJ PANTOPRAZOLE SODIUM, VIA: HCPCS | Performed by: SURGERY

## 2017-02-15 RX ORDER — HYDROCODONE BITARTRATE AND ACETAMINOPHEN 7.5; 325 MG/15ML; MG/15ML
15 SOLUTION ORAL EVERY 4 HOURS PRN
Qty: 473 ML | Refills: 0 | Status: SHIPPED | OUTPATIENT
Start: 2017-02-15 | End: 2017-03-22

## 2017-02-15 RX ORDER — ONDANSETRON 4 MG/1
4 TABLET, ORALLY DISINTEGRATING ORAL EVERY 8 HOURS PRN
Qty: 15 TABLET | Refills: 0 | Status: SHIPPED | OUTPATIENT
Start: 2017-02-15 | End: 2017-03-22

## 2017-02-15 RX ADMIN — HYDROCODONE BITARTRATE AND ACETAMINOPHEN 15 ML: 7.5; 325 SOLUTION ORAL at 04:02

## 2017-02-15 RX ADMIN — SODIUM CHLORIDE: 0.9 INJECTION, SOLUTION INTRAVENOUS at 04:02

## 2017-02-15 RX ADMIN — PANTOPRAZOLE SODIUM 40 MG: 40 INJECTION, POWDER, FOR SOLUTION INTRAVENOUS at 10:02

## 2017-02-15 RX ADMIN — METOPROLOL SUCCINATE 50 MG: 25 TABLET, FILM COATED, EXTENDED RELEASE ORAL at 09:02

## 2017-02-15 NOTE — PROGRESS NOTES
The patient was seen and examined. Doing well.  No particular concerns.  On clear liquid diet per bariatric protocol.    Rashaad Watson

## 2017-02-15 NOTE — DISCHARGE SUMMARY
[facility]  Discharge Summary  General Surgery      Admit Date: 2/13/2017    Discharge Date and Time:  02/15/2017 9:03 AM    Attending Physician: Rashaad Watson MD     Discharge Provider: Tsering Bazzi    Reason for Admission: robotic sleeve gastrectomy    Procedures Performed: Procedure(s) (LRB):  XI ROBOT ASSISTED LAPAROSCOPIC SLEEVE-GASTRECTOMY (N/A)    Hospital Course (synopsis of major diagnoses, care, treatment, and services provided during the course of the hospital stay): uneventful post op course     Consults: dietician    Significant Diagnostic Studies: Labs:   CMP   Recent Labs  Lab 02/14/17  0550      K 3.2*      CO2 25   *   BUN 6*   CREATININE 0.6   CALCIUM 8.7   ANIONGAP 11   ESTGFRAFRICA >60   EGFRNONAA >60    and CBC   Recent Labs  Lab 02/14/17  0550   WBC 11.10   HGB 12.4   HCT 35.6*          Final Diagnoses:   Principal Problem: Morbid obesity due to excess calories   Secondary Diagnoses:   Active Hospital Problems    Diagnosis  POA    *Morbid obesity due to excess calories [E66.01]  Yes      Resolved Hospital Problems    Diagnosis Date Resolved POA   No resolved problems to display.       Discharged Condition: good    Disposition: Home or Self Care    Follow Up/Patient Instructions:     Medications:  Reconciled Home Medications:   Current Discharge Medication List      START taking these medications    Details   hydrocodone-acetaminophen (HYCET) solution 7.5-325 mg/15mL Take 15 mLs by mouth every 4 (four) hours as needed.  Qty: 473 mL, Refills: 0         CONTINUE these medications which have NOT CHANGED    Details   ACCU-CHEK FASTCLIX Misc USE ONE TIME DAILY  Qty: 100 each, Refills: 3    Associated Diagnoses: DM II (diabetes mellitus, type II), controlled      eszopiclone (LUNESTA) 3 mg Tab Take 1 tablet (3 mg total) by mouth nightly as needed.  Qty: 90 tablet, Refills: 0    Comments: This replaces the 90 day rx sent to local [facility] in error  Associated  Diagnoses: Insomnia, unspecified type      hydrocodone-acetaminophen 7.5-325mg (NORCO) 7.5-325 mg per tablet Take 1 tablet by mouth daily as needed for Pain.  Qty: 15 tablet, Refills: 0    Associated Diagnoses: Trauma      metoprolol succinate (TOPROL-XL) 50 MG 24 hr tablet Take 1 tablet (50 mg total) by mouth once daily.  Qty: 90 tablet, Refills: 1    Associated Diagnoses: Secondary hypertension      nabumetone (RELAFEN) 500 MG tablet Take 500 mg by mouth once daily.      omeprazole (PRILOSEC) 20 MG capsule Take 1 capsule (20 mg total) by mouth once daily.  Qty: 90 capsule, Refills: 1      pravastatin (PRAVACHOL) 40 MG tablet TAKE 1 TABLET ONE TIME DAILY  Qty: 30 tablet, Refills: 0      ALBUTEROL SULFATE (VENTOLIN HFA INHL) Inhale 1 puff into the lungs every 4 (four) hours as needed.      amitriptyline (ELAVIL) 100 MG tablet TAKE 1 TABLET (100 MG TOTAL) BY MOUTH NIGHTLY.  Qty: 90 tablet, Refills: 0      aspirin (ECOTRIN) 81 MG EC tablet 81 mg once daily.       blood sugar diagnostic (ACCU-CHEK SMARTVIEW TEST STRIP) Strp Use to test once daily  Qty: 100 strip, Refills: 0    Associated Diagnoses: DM II (diabetes mellitus, type II), controlled      blood-glucose meter kit Use as instructed  Qty: 1 each, Refills: 0    Comments: accu-chek laya connect meter       blood-glucose meter Misc 1 each by Misc.(Non-Drug; Combo Route) route once daily.  Qty: 1 each, Refills: 0    Comments: Pt requests Accuchek Saadia replacement - it is not working after changing battery.  Associated Diagnoses: Diabetes mellitus type 2, controlled      metformin (GLUCOPHAGE-XR) 500 MG 24 hr tablet TAKE 3 TABLETS ONE TIME DAILY  Qty: 270 tablet, Refills: 0    Associated Diagnoses: Type II or unspecified type diabetes mellitus without mention of complication, not stated as uncontrolled      mupirocin (BACTROBAN) 2 % ointment Apply topically.              Discharge Procedure Orders  Lifting restrictions   Order Comments: Avoid lifting over 20lbs      Call MD for:  temperature >100.4     Call MD for:  persistent nausea and vomiting or diarrhea     Call MD for:  severe uncontrolled pain     Call MD for:  redness, tenderness, or signs of infection (pain, swelling, redness, odor or green/yellow discharge around incision site)     Call MD for:  difficulty breathing or increased cough     Call MD for:  increased confusion or weakness     Call MD for:  persistent dizziness, light-headedness, or visual disturbances     No dressing needed

## 2017-02-15 NOTE — CONSULTS
Provided written materials and verbal instruction of diet post sleeve gastrectomy. Patient has all required and appropriate vitamin/mineral and protein supplementations at home. She was very well versed on the diet materials. Anticipate good compliance.

## 2017-02-15 NOTE — PLAN OF CARE
Met with patient at the bedside to assess for discharge needs.  Patient states she has a walker at home if she needs it, but mostly ambulates without assistance.  Her son will be home with her following discharge and will be able to assist her with any needs.  Her son will also drive her to follow up appointments until she can drive herself.  She does not have any post discharge needs.     02/15/17 0919   Discharge Assessment   Assessment Type Discharge Planning Assessment   Confirmed/corrected address and phone number on facesheet? Yes   Assessment information obtained from? Patient;Medical Record   Expected Length of Stay (days) (discharge today)   Communicated expected length of stay with patient/caregiver yes   Prior to hospitilization cognitive status: Alert/Oriented   Prior to hospitalization functional status: Independent   Current cognitive status: Alert/Oriented   Current Functional Status: Independent   Arrived From admitted as an inpatient;home or self-care   Lives With child(joelle), adult   Able to Return to Prior Arrangements yes   Is patient able to care for self after discharge? Yes   How many people do you have in your home that can help with your care after discharge? 1   Who are your caregiver(s) and their phone number(s)? Claude Monroy, son [phone]   Patient's perception of discharge disposition admitted as an inpatient;home or selfcare   Readmission Within The Last 30 Days no previous admission in last 30 days   Patient currently being followed by outpatient case management? No   Patient currently receives home health services? No   Does the patient currently use HME? Yes   Patient currently receives private duty nursing? No   Patient currently receives any other outside agency services? No   Equipment Currently Used at Home walker, rolling   Do you have any problems affording any of your prescribed medications? No   Is the patient taking medications as prescribed? yes   Do you have any  financial concerns preventing you from receiving the healthcare you need? No   Does the patient have transportation to healthcare appointments? Yes   Transportation Available car;family or friend will provide   On Dialysis? No   Does the patient receive services at the Coumadin Clinic? No   Are there any open cases? No   Discharge Plan A Home with family   Discharge Plan B Home with family   Patient/Family In Agreement With Plan yes

## 2017-02-16 ENCOUNTER — PATIENT MESSAGE (OUTPATIENT)
Dept: SURGERY | Facility: CLINIC | Age: 67
End: 2017-02-16

## 2017-02-16 NOTE — NURSING
Patient discharged home with family. Patient is AAOx4.. Denies discomfort or pain. Discharge instructions given to patient. Patient verbalized understanding.

## 2017-02-17 ENCOUNTER — PATIENT OUTREACH (OUTPATIENT)
Dept: ADMINISTRATIVE | Facility: CLINIC | Age: 67
End: 2017-02-17
Payer: MEDICARE

## 2017-02-17 RX ORDER — MULTIVIT WITH MINERALS/HERBS
1 TABLET ORAL NIGHTLY
COMMUNITY
End: 2018-07-17

## 2017-02-17 RX ORDER — IBUPROFEN 200 MG
1 CAPSULE ORAL DAILY
COMMUNITY
End: 2018-07-17

## 2017-02-17 RX ORDER — FERROUS SULFATE 325(65) MG
18 TABLET, DELAYED RELEASE (ENTERIC COATED) ORAL DAILY
COMMUNITY
End: 2018-07-17

## 2017-02-17 RX ORDER — MULTIVITAMIN
1 TABLET ORAL NIGHTLY
COMMUNITY
End: 2019-01-17

## 2017-02-17 NOTE — PATIENT INSTRUCTIONS
Discharge Instructions for Gastrectomy  You had a gastrectomy. During this surgery, some or all of your stomach was removed. As you heal from surgery, heres what youll need to know to care for yourself.  Eating and drinking  · Follow the diet that was prescribed for you in the hospital. Eat pureed foods and liquids for 3 weeks after the surgery.  · Drink liquids in smaller amounts than you used to. This will make it easier for your body to digest liquids. But, it is important that you continue to drink liquids (in small amounts) so that you do not become dehydrated. Some signs of dehydration include dry mouth and dark urine.  · Eat slowly. Eating too much or too fast will cause nausea and vomiting.  · Liquids and solids may need to be eaten separately.  · Use liquid nutritional supplements recommended by a health care provider to make sure you get enough calories.  · Try to eat small, frequent high protein low carbohydrate meals when you are eating solids again.  Activity  · Remember, recovery takes several weeks. It is common to feel tired. Rest as needed.  · Walk as often as you feel able. Increase your activity slowly.  · Do not lift anything heavier than 10 pounds until the healthcare provider says it's OK.  · Avoid strenuous chores, such as vacuuming or lifting full bags of garbage, until the healthcare provider says its OK.  · Climb stairs slowly and pause after every few steps.  · Do not drive for 2 weeks after surgery.  · Start an exercise program 1 week after discharge. You can benefit from simple activities such as walking or gardening. Ask your healthcare provider how to get started.  · Ask your healthcare provider when you can expect to return to work.  Other home care  · Continue the coughing and deep breathing exercises that you learned in the hospital.  · Shower as needed. But avoid baths, swimming pools, and hot tubs until your healthcare provider says they are OK. This helps prevent infection  of the incision site.  · Keep the incision clean and dry. Wash the incision gently with mild soap and warm water. Then gently pat the incision dry with a towel.  · Follow your healthcare providers instructions about caring for the dressing covering your incisions.  · If your healthcare provider used small white adhesive strips to close the incision, do not remove them. Let the strips fall off on their own. If they dont come off within 2 weeks after you were sent home, call your healthcare provider.  · Take your medicines in crushed or liquid form for 3 weeks after surgery.  · Take a chewable vitamin 2 times a day. Ask your healthcare provider if you also need to take a supplement for vitamin B12.  · Take all medicines as directed by your healthcare provider.  Follow-up care  Follow up with your healthcare provider, or as advised.     When to call your healthcare provider  Call your healthcare provider right away if you have any of the following:  · Cloudy or smelly drainage from the incision site  · Fever of 100.4°F (38°C) or higher, or as directed by your healthcare provider  · Shaking chills  · Fast pulse  · Night sweats  · Pain, nausea, or vomiting that keeps occurring after you eat  · Diarrhea beyond the first week after discharge  · Pain in your upper back, chest, or left shoulder  · Hiccups that wont stop or that keep coming back  · Confusion, depression, or unusual fatigue  · Signs of bladder infection. These include urinating more often than usual, and burning, pain, bleeding, or hesitancy when you urinate.   Date Last Reviewed: 10/1/2016  © 7906-6148 [employer]. [address] [place] [address], [place] [address]. All rights reserved. This information is not intended as a substitute for professional medical care. Always follow your healthcare professional's instructions.

## 2017-02-19 ENCOUNTER — PATIENT MESSAGE (OUTPATIENT)
Dept: SURGERY | Facility: CLINIC | Age: 67
End: 2017-02-19

## 2017-03-07 ENCOUNTER — PATIENT MESSAGE (OUTPATIENT)
Dept: SURGERY | Facility: CLINIC | Age: 67
End: 2017-03-07

## 2017-03-14 DIAGNOSIS — E11.9 DM II (DIABETES MELLITUS, TYPE II), CONTROLLED: ICD-10-CM

## 2017-03-14 RX ORDER — BLOOD SUGAR DIAGNOSTIC
STRIP MISCELLANEOUS
Qty: 100 STRIP | Refills: 3 | Status: SHIPPED | OUTPATIENT
Start: 2017-03-14 | End: 2018-01-09 | Stop reason: SDUPTHER

## 2017-03-22 ENCOUNTER — NUTRITION (OUTPATIENT)
Dept: DIABETES | Facility: CLINIC | Age: 67
End: 2017-03-22
Payer: MEDICARE

## 2017-03-22 ENCOUNTER — OFFICE VISIT (OUTPATIENT)
Dept: SURGERY | Facility: CLINIC | Age: 67
End: 2017-03-22
Payer: MEDICARE

## 2017-03-22 VITALS
HEART RATE: 95 BPM | TEMPERATURE: 98 F | SYSTOLIC BLOOD PRESSURE: 129 MMHG | WEIGHT: 187.38 LBS | DIASTOLIC BLOOD PRESSURE: 82 MMHG | BODY MASS INDEX: 33.19 KG/M2

## 2017-03-22 VITALS — WEIGHT: 188.69 LBS | HEIGHT: 63 IN | BODY MASS INDEX: 33.43 KG/M2

## 2017-03-22 DIAGNOSIS — E66.01 MORBID OBESITY DUE TO EXCESS CALORIES: ICD-10-CM

## 2017-03-22 DIAGNOSIS — Z98.890 POST-OPERATIVE STATE: Primary | ICD-10-CM

## 2017-03-22 PROCEDURE — 99999 PR PBB SHADOW E&M-EST. PATIENT-LVL III: CPT | Mod: PBBFAC,,, | Performed by: DIETITIAN, REGISTERED

## 2017-03-22 PROCEDURE — 99999 PR PBB SHADOW E&M-EST. PATIENT-LVL III: CPT | Mod: PBBFAC,,, | Performed by: PHYSICIAN ASSISTANT

## 2017-03-22 PROCEDURE — 99024 POSTOP FOLLOW-UP VISIT: CPT | Mod: S$GLB,,, | Performed by: PHYSICIAN ASSISTANT

## 2017-03-22 PROCEDURE — 97803 MED NUTRITION INDIV SUBSEQ: CPT | Mod: S$GLB,,, | Performed by: DIETITIAN, REGISTERED

## 2017-03-22 NOTE — PROGRESS NOTES
PCP: Denia Maldonado MD  REFERRING PROVIDER:  Rashaad Watson MD      HISTORY OF PRESENT ILLNESS:  66 y.o. female patient is in clinic today for s/p bariatric surgery.    LAB: Results reviewed    SELF-MONITORING: Glucometer - accuchek; Food - none are avail    ACTIVITY LEVEL: Walk 1-1.5 miles 30 min daily      NUTRITION INTAKE: Meal patterns include 3 meals, 3 snacks daily with intake 1400 cals/d. Excess protein, fruit and bread intake. Adequate vit/min supplementation.   B - banana, protein shake (pure protein) - water  S - premier protein  L - jello, fruit OR 2 slc wheat bread, 2 slc turkey, ff pudding   S - premier protein   D - 2 bkd chix legs, greek yogurt   S -  protein shake (pure protein)  Beverages - water, crystal light  DIning out - rare    PSYCHOSOCIAL: Stage of change - action; Barriers to change - none    MNT ASSESSMENT:   600-800 calories, 80 ounces protein daily  low-fat, low-sodium  150 min physical activity per week, moderate intensity, as tolerated  Protein, vit/min supplementation     PLAN:  · Encouraged daily self-monitoring of glucose, food and activity patterns, return records to clinic.   · Reviewed glucose goals. Discussed prevention, identification and treatment of hyperglycemia and hypoglycemia and when to contact clinic. Instructed to test glucose fasting, before supper or at bedtime. Reviewed action of diabetes medications and to continue taking as prescribed.  · Provided post surgery meal-planning instruction w/ emphasis on reducing fruit servings, protein shakes to 2-3 serv/d and mixing w/ water and avoiding breads.                               · Discussed physical activity with review of benefits, methods, precautions. Encouraged progress.   · Discussed bx strategies for improving, strategies for improving social & environmental support of lifestyle changes.  · Reviewed ADA goals, progress.    GOALS: Daily glucose, food & activity journal. Meal plan-90% accuracy. Activity-150 minutes  per week.   Visit Time Spent:  30 min    Thank you for the opportunity to work with your patient.

## 2017-03-22 NOTE — MR AVS SNAPSHOT
[facility]  [address] [place] [address]  Phone: [phone]  Fax: [phone]                  Cassandra Campos   3/22/2017 1:40 PM   Office Visit    Description:  Female : 1950   Provider:  Tsering Bazzi PA-C   Department:  [facility]           Reason for Visit     Post-op Evaluation                To Do List           Future Appointments        Provider Department Dept Phone    3/22/2017 3:30 PM Ryanne Avila RD, CDE [facility] Diabetes Management [phone]    2017 2:45 PM Jayro Pedraza Sr., MD [facility] Orthopedics [phone]    2017 1:20 PM Rashaad Watson MD [facility] General Surgery [phone]    2017 9:40 AM Denia Maldonado MD [facility] Care [phone]      Goals (5 Years of Data)     None      [facility] On [facility]     [facility] - 7 Assistance  Registered nurses in the [facility] provide clinical advisement, health education, appointment booking, and other advisory services.  Call for this free service at [phone].             Medications           Message regarding Medications     Verify the changes and/or additions to your medication regime listed below are the same as discussed with your clinician today.  If any of these changes or additions are incorrect, please notify your healthcare provider.        STOP taking these medications     hydrocodone-acetaminophen (HYCET) solution 7.5-325 mg/15mL Take 15 mLs by mouth every 4 (four) hours as needed.    ondansetron (ZOFRAN-ODT) 4 MG TbDL Take 1 tablet (4 mg total) by mouth every 8 (eight) hours as needed.           Verify that the below list of medications is an accurate representation of the medications you are currently taking.  If none reported, the list may be blank. If incorrect, please contact your healthcare provider. Carry this list with you in case of emergency.           Current Medications     ACCU-CHEK FASTCLIX Misc USE ONE  TIME DAILY    ACCU-CHEK SMARTVIEW TEST STRIP Strp USE TO TEST ONCE DAILY    ALBUTEROL SULFATE (VENTOLIN HFA INHL) Inhale 1 puff into the lungs every 4 (four) hours as needed.    amitriptyline (ELAVIL) 100 MG tablet TAKE 1 TABLET (100 MG TOTAL) BY MOUTH NIGHTLY.    b complex vitamins tablet Take 1 tablet by mouth once daily.    blood-glucose meter kit Use as instructed    calcium citrate (CALCITRATE) 200 mg (950 mg) tablet Take 1 tablet by mouth once daily.    cyanocobalamin, vitamin B-12, (VITAMIN B-12) 50 mcg tablet Take 50 mcg by mouth once daily.    eszopiclone (LUNESTA) 3 mg Tab Take 1 tablet (3 mg total) by mouth nightly as needed.    ferrous sulfate 325 (65 FE) MG EC tablet Take 325 mg by mouth 3 (three) times daily with meals.    metformin (GLUCOPHAGE-XR) 500 MG 24 hr tablet TAKE 3 TABLETS ONE TIME DAILY    metoprolol succinate (TOPROL-XL) 50 MG 24 hr tablet Take 1 tablet (50 mg total) by mouth once daily.    multivitamin (ONE DAILY MULTIVITAMIN) per tablet Take 1 tablet by mouth once daily.    nabumetone (RELAFEN) 500 MG tablet Take 500 mg by mouth once daily.    omeprazole (PRILOSEC) 20 MG capsule Take 1 capsule (20 mg total) by mouth once daily.    pravastatin (PRAVACHOL) 40 MG tablet TAKE 1 TABLET ONE TIME DAILY           Clinical Reference Information           Your Vitals Were     BP Pulse Temp Weight BMI    129/82 95 98 °F (36.7 °C) (Oral) 85 kg (187 lb 6.3 oz) 33.19 kg/m2      Blood Pressure          Most Recent Value    BP  129/82      Allergies as of 3/22/2017     No Known Allergies      Immunizations Administered on Date of Encounter - 3/22/2017     None      Language Assistance Services     ATTENTION: Language assistance services are available, free of charge. Please call [phone].      ATENCIÓN: Si sheila yanelis, tiene a machuca disposición servicios gratuitos de asistencia lingüística. Llame al [phone].     CHÚ Ý: N?u b?n nói Ti?ng Vi?t, có các d?ch v? h? tr? ngôn ng? mi?n phí dành cho  b?n. G?i s? [phone].         [facility] complies with applicable Federal civil rights laws and does not discriminate on the basis of race, color, national origin, age, disability, or sex.

## 2017-03-22 NOTE — MR AVS SNAPSHOT
[facility] Diabetes Management  [address] [place] [address]  Phone: [phone]  Fax: [phone]                  Cassandra Campos   3/22/2017 3:30 PM   Nutrition    Description:  Female : 1950   Provider:  Rynane Avila RD, CDE   Department:  [facility] - Diabetes Management           Reason for Visit     Nutrition Counseling           Diagnoses this Visit        Comments    Uncontrolled type 2 diabetes mellitus with complication, without long-term current use of insulin    -  Primary     Morbid obesity due to excess calories                To Do List           Future Appointments        Provider Department Dept Phone    3/22/2017 3:30 PM Ryanne Avila RD, CDE [facility] Diabetes Management [phone]    2017 2:45 PM Jayro Pedraza Sr., MD [facility] Orthopedics [phone]    2017 1:20 PM Rashaad Watson MD [facility] General Surgery [phone]    2017 9:40 AM Denia Maldonado MD [facility] Primary Care [phone]      Goals (5 Years of Data)     None      Follow-Up and Disposition     Return in about 3 months (around 2017), or E66.01,E11.9 Rashaad Watson MD; [facility] try coord other appts if poss.      [facility]     [facility] - 24/ Assistance  Registered nurses in the [facility] On Call Center provide clinical advisement, health education, appointment booking, and other advisory services.  Call for this free service at [phone].             Medications           Message regarding Medications     Verify the changes and/or additions to your medication regime listed below are the same as discussed with your clinician today.  If any of these changes or additions are incorrect, please notify your healthcare provider.             Verify that the below list of medications is an accurate representation of the medications you are currently taking.  If none reported, the list may be blank. If incorrect, please contact your healthcare provider.  "Carry this list with you in case of emergency.           Current Medications     ACCU-CHEK FASTCLIX Misc USE ONE TIME DAILY    ACCU-CHEK SMARTVIEW TEST STRIP Strp USE TO TEST ONCE DAILY    ALBUTEROL SULFATE (VENTOLIN HFA INHL) Inhale 1 puff into the lungs every 4 (four) hours as needed.    amitriptyline (ELAVIL) 100 MG tablet TAKE 1 TABLET (100 MG TOTAL) BY MOUTH NIGHTLY.    b complex vitamins tablet Take 1 tablet by mouth once daily.    blood-glucose meter kit Use as instructed    calcium citrate (CALCITRATE) 200 mg (950 mg) tablet Take 1 tablet by mouth once daily. Ca 630 mg/Vit D 500IU - Pt takes 1 tab three times daily    cyanocobalamin, vitamin B-12, (VITAMIN B-12) 50 mcg tablet Take 50 mcg by mouth once daily.    eszopiclone (LUNESTA) 3 mg Tab Take 1 tablet (3 mg total) by mouth nightly as needed.    ferrous sulfate 325 (65 FE) MG EC tablet Take 18 mg by mouth once daily.     metformin (GLUCOPHAGE-XR) 500 MG 24 hr tablet TAKE 3 TABLETS ONE TIME DAILY    metoprolol succinate (TOPROL-XL) 50 MG 24 hr tablet Take 1 tablet (50 mg total) by mouth once daily.    multivitamin (ONE DAILY MULTIVITAMIN) per tablet Take 1 tablet by mouth 2 (two) times daily.     nabumetone (RELAFEN) 500 MG tablet Take 500 mg by mouth once daily.    omeprazole (PRILOSEC) 20 MG capsule Take 1 capsule (20 mg total) by mouth once daily.    pravastatin (PRAVACHOL) 40 MG tablet TAKE 1 TABLET ONE TIME DAILY           Clinical Reference Information           Your Vitals Were     Height Weight BMI          5' 3" (1.6 m) 85.6 kg (188 lb 11.4 oz) 33.43 kg/m2        Allergies as of 3/22/2017     No Known Allergies      Immunizations Administered on Date of Encounter - 3/22/2017     None      Language Assistance Services     ATTENTION: Language assistance services are available, free of charge. Please call [phone].      ATENCIÓN: Si habla español, tiene a machuca disposición servicios gratuitos de asistencia lingüística. Llame al " [phone].     SHORTY Ý: N?u b?n nói Ti?ng Vi?t, có các d?ch v? h? tr? ngôn ng? mi?n phí dành cho b?n. G?i s? [phone].         [facility] complies with applicable Federal civil rights laws and does not discriminate on the basis of race, color, national origin, age, disability, or sex.

## 2017-03-22 NOTE — PROGRESS NOTES
Cassandra Campos is status post robotic sleeve gastrectomy and presents today for follow-up care.  She is tolerating a regular diet and denies fevers, nausea or vomiting. BG averaging around 100.     preop weight: 94.5kg (208 lb 5.4 oz)  Weight today: 85 kg (187 lb 6.3 oz),   PE: Abdomen is soft, non-tender, non-distended.  Incisions clean, dry, and intact.    Pathology report was reviewed with the patient, which showed Gastric antrum:  Partial gastrectomy specimen without significant histologic alteration;  Morbid obesity by clinical history.    A/P:  Normal post-operative course.    The patient is instructed to avoid heavy lifting until 2 weeks after the surgery date.  Return to clinic at 3 months post op.

## 2017-03-26 ENCOUNTER — PATIENT MESSAGE (OUTPATIENT)
Dept: ORTHOPEDICS | Facility: CLINIC | Age: 67
End: 2017-03-26

## 2017-04-09 ENCOUNTER — PATIENT MESSAGE (OUTPATIENT)
Dept: INTERNAL MEDICINE | Facility: CLINIC | Age: 67
End: 2017-04-09

## 2017-04-09 DIAGNOSIS — I15.9 SECONDARY HYPERTENSION: ICD-10-CM

## 2017-04-10 RX ORDER — METOPROLOL SUCCINATE 50 MG/1
50 TABLET, EXTENDED RELEASE ORAL DAILY
Qty: 90 TABLET | Refills: 1 | Status: SHIPPED | OUTPATIENT
Start: 2017-04-10 | End: 2018-01-29 | Stop reason: SDUPTHER

## 2017-04-11 RX ORDER — AMITRIPTYLINE HYDROCHLORIDE 100 MG/1
TABLET ORAL
Qty: 90 TABLET | Refills: 3 | Status: SHIPPED | OUTPATIENT
Start: 2017-04-11 | End: 2018-01-09 | Stop reason: SDUPTHER

## 2017-04-12 RX ORDER — METFORMIN HYDROCHLORIDE 500 MG/1
TABLET, EXTENDED RELEASE ORAL
Qty: 270 TABLET | Refills: 0 | Status: SHIPPED | OUTPATIENT
Start: 2017-04-12 | End: 2017-06-20 | Stop reason: SDUPTHER

## 2017-04-17 ENCOUNTER — PATIENT MESSAGE (OUTPATIENT)
Dept: ORTHOPEDICS | Facility: CLINIC | Age: 67
End: 2017-04-17

## 2017-04-17 DIAGNOSIS — M79.641 RIGHT HAND PAIN: ICD-10-CM

## 2017-04-17 DIAGNOSIS — M25.522 LEFT ELBOW PAIN: Primary | ICD-10-CM

## 2017-04-20 ENCOUNTER — HOSPITAL ENCOUNTER (OUTPATIENT)
Dept: RADIOLOGY | Facility: HOSPITAL | Age: 67
Discharge: HOME | End: 2017-04-20
Attending: ORTHOPAEDIC SURGERY
Payer: MEDICARE

## 2017-04-20 ENCOUNTER — OFFICE VISIT (OUTPATIENT)
Dept: ORTHOPEDICS | Facility: CLINIC | Age: 67
End: 2017-04-20
Payer: MEDICARE

## 2017-04-20 ENCOUNTER — TELEPHONE (OUTPATIENT)
Dept: SURGERY | Facility: CLINIC | Age: 67
End: 2017-04-20

## 2017-04-20 VITALS
SYSTOLIC BLOOD PRESSURE: 91 MMHG | WEIGHT: 182.13 LBS | HEART RATE: 89 BPM | BODY MASS INDEX: 32.26 KG/M2 | DIASTOLIC BLOOD PRESSURE: 63 MMHG

## 2017-04-20 DIAGNOSIS — M65.30 TRIGGER FINGER OF RIGHT HAND, UNSPECIFIED FINGER: Primary | ICD-10-CM

## 2017-04-20 DIAGNOSIS — M25.522 LEFT ELBOW PAIN: ICD-10-CM

## 2017-04-20 DIAGNOSIS — Z01.818 PRE-OP EXAM: Primary | ICD-10-CM

## 2017-04-20 DIAGNOSIS — M65.339 TRIGGER MIDDLE FINGER, UNSPECIFIED LATERALITY: ICD-10-CM

## 2017-04-20 DIAGNOSIS — M77.12 LATERAL EPICONDYLITIS OF LEFT ELBOW: ICD-10-CM

## 2017-04-20 DIAGNOSIS — M79.641 RIGHT HAND PAIN: ICD-10-CM

## 2017-04-20 PROCEDURE — 3078F DIAST BP <80 MM HG: CPT | Mod: S$GLB,,, | Performed by: ORTHOPAEDIC SURGERY

## 2017-04-20 PROCEDURE — 73130 X-RAY EXAM OF HAND: CPT | Mod: 26,RT,, | Performed by: RADIOLOGY

## 2017-04-20 PROCEDURE — 99214 OFFICE O/P EST MOD 30 MIN: CPT | Mod: S$GLB,,, | Performed by: ORTHOPAEDIC SURGERY

## 2017-04-20 PROCEDURE — 73070 X-RAY EXAM OF ELBOW: CPT | Mod: 26,LT,, | Performed by: RADIOLOGY

## 2017-04-20 PROCEDURE — 3074F SYST BP LT 130 MM HG: CPT | Mod: S$GLB,,, | Performed by: ORTHOPAEDIC SURGERY

## 2017-04-20 PROCEDURE — 1160F RVW MEDS BY RX/DR IN RCRD: CPT | Mod: S$GLB,,, | Performed by: ORTHOPAEDIC SURGERY

## 2017-04-20 PROCEDURE — 1159F MED LIST DOCD IN RCRD: CPT | Mod: S$GLB,,, | Performed by: ORTHOPAEDIC SURGERY

## 2017-04-20 PROCEDURE — 1125F AMNT PAIN NOTED PAIN PRSNT: CPT | Mod: S$GLB,,, | Performed by: ORTHOPAEDIC SURGERY

## 2017-04-20 PROCEDURE — 99999 PR PBB SHADOW E&M-EST. PATIENT-LVL III: CPT | Mod: PBBFAC,,, | Performed by: ORTHOPAEDIC SURGERY

## 2017-04-20 NOTE — PROGRESS NOTES
CC:This is a 66-year-old female that complains of right Middle finger triggering and left elbow pain    HPI:The patient has had  Persistent right middle finger triggering for over 6 months as well as left elbow tenderness and pain.    PMH:    Past Medical History:   Diagnosis Date    Borderline glaucoma     COPD (chronic obstructive pulmonary disease)     pt states she does not have copd    Diabetes mellitus, type 2     Gastritis     Hydradenitis     Hyperlipidemia     Hypertension     Insomnia     Migraines 2000    Nasal septum perforation     Sleep apnea     SVT (supraventricular tachycardia) 2013    Type II or unspecified type diabetes mellitus without mention of complication, not stated as uncontrolled 2013       PSH:    Past Surgical History:   Procedure Laterality Date    AXILLARY HIDRADENITIS EXCISION      BREAST LUMPECTOMY Bilateral 1998    Benign    CARPAL TUNNEL RELEASE      bilateral    CATARACT EXTRACTION      OU     SECTION      CHOLECYSTECTOMY  2014    CYST REMOVAL  2015    sebaceous cyst removed from face    TONSILLECTOMY, ADENOIDECTOMY      TRIGGER FINGER RELEASE Right 2015    Dr. Pedraza       Family Hx:    Family History   Problem Relation Age of Onset    Prostate cancer Brother     Diabetes Maternal Aunt        Allergy:  Review of patient's allergies indicates:  No Known Allergies    Medication:    Current Outpatient Prescriptions:     ACCU-CHEK FASTCLIX Misc, USE ONE TIME DAILY, Disp: 100 each, Rfl: 3    ACCU-CHEK SMARTVIEW TEST STRIP Strp, USE TO TEST ONCE DAILY, Disp: 100 strip, Rfl: 3    amitriptyline (ELAVIL) 100 MG tablet, TAKE 1 TABLET NIGHTLY, Disp: 90 tablet, Rfl: 3    b complex vitamins tablet, Take 1 tablet by mouth once daily., Disp: , Rfl:     blood-glucose meter kit, Use as instructed, Disp: 1 each, Rfl: 0    calcium citrate (CALCITRATE) 200 mg (950 mg) tablet, Take 1 tablet by mouth once daily. Ca 630 mg/Vit D  500IU - Pt takes 1 tab three times daily, Disp: , Rfl:     cyanocobalamin, vitamin B-12, (VITAMIN B-12) 50 mcg tablet, Take 50 mcg by mouth once daily., Disp: , Rfl:     eszopiclone (LUNESTA) 3 mg Tab, Take 1 tablet (3 mg total) by mouth nightly as needed., Disp: 90 tablet, Rfl: 0    ferrous sulfate 325 (65 FE) MG EC tablet, Take 18 mg by mouth once daily. , Disp: , Rfl:     metformin (GLUCOPHAGE-XR) 500 MG 24 hr tablet, TAKE 3 TABLETS ONE TIME DAILY, Disp: 270 tablet, Rfl: 0    metoprolol succinate (TOPROL-XL) 50 MG 24 hr tablet, Take 1 tablet (50 mg total) by mouth once daily., Disp: 90 tablet, Rfl: 1    multivitamin (ONE DAILY MULTIVITAMIN) per tablet, Take 1 tablet by mouth 2 (two) times daily. , Disp: , Rfl:     omeprazole (PRILOSEC) 20 MG capsule, Take 1 capsule (20 mg total) by mouth once daily. (Patient taking differently: Take 20 mg by mouth every evening. ), Disp: 90 capsule, Rfl: 1    pravastatin (PRAVACHOL) 40 MG tablet, TAKE 1 TABLET ONE TIME DAILY (Patient taking differently: Take 40 mg by mouth every evening. TAKE 1 TABLET ONE TIME DAILY), Disp: 30 tablet, Rfl: 0    ALBUTEROL SULFATE (VENTOLIN HFA INHL), Inhale 1 puff into the lungs every 4 (four) hours as needed., Disp: , Rfl:     nabumetone (RELAFEN) 500 MG tablet, Take 500 mg by mouth once daily., Disp: , Rfl:     Social History:    Social History     Social History    Marital status:      Spouse name: N/A    Number of children: 1    Years of education: N/A     Occupational History     aid      Social History Main Topics    Smoking status: Former Smoker     Packs/day: 0.50     Years: 30.00     Types: Cigarettes     Start date: 1/1/2012    Smokeless tobacco: Never Used    Alcohol use Yes      Comment: rarely // wine  No alcohol prior to surgery    Drug use: No    Sexual activity: Not Currently     Other Topics Concern    Not on file     Social History Narrative    Single, part-time teacher. Masters  degree biology.        Vitals:     BP 91/63 (BP Location: Right arm, Patient Position: Sitting, BP Method: Automatic)  Pulse 89  Wt 82.6 kg (182 lb 1.6 oz)  BMI 32.26 kg/m2     ROS:  GENERAL: No fever, chills, fatigability or weight loss.  SKIN: No rashes, itching or changes in color or texture of skin.  HEAD: No headaches or recent head trauma.  EYES: Visual acuity fine. No photophobia, ocular pain or diplopia.  EARS: Denies ear pain, discharge or vertigo.  NOSE: No loss of smell, no epistaxis or postnasal drip.  MOUTH & THROAT: No hoarseness or change in voice. No excessive gum bleeding.  NODES: Denies swollen glands.  CHEST: Denies BOGGS, cyanosis, wheezing, cough and sputum production.  CARDIOVASCULAR: Denies chest pain, PND, orthopnea or reduced exercise tolerance.  ABDOMEN: Appetite fine. No weight loss. Denies diarrhea, abdominal pain, hematemesis or blood in stool.  URINARY: No flank pain, dysuria or hematuria.  PERIPHERAL VASCULAR: No claudication or cyanosis.  NEUROLOGIC: No history of seizures, paralysis, alteration of gait or coordination.  MUSCULOSKELETAL: See HPI    PE:  APPEARANCE: Well nourished, well developed, in no acute distress.   HEAD: Normocephalic, atraumatic.  EYES: PERRL. EOMI.   EARS: TM's intact. Light reflex normal. No retraction or perforation.   NOSE: Mucosa pink. Airway clear.  MOUTH & THROAT: No tonsillar enlargement. No pharyngeal erythema or exudate. No stridor.  NECK: Supple.   NODES: No cervical, axillary or inguinal lymph node enlargement.  CHEST: Lungs clear to auscultation.  CARDIOVASCULAR: Normal S1, S2. No rubs, murmurs or gallops.  ABDOMEN: Bowel sounds normal. Not distended. Soft. No tenderness or masses.  NEUROLOGIC: Cranial Nerves: II-XII grossly intact, also see MUSCULOSKELETAL  MUSCULOSKELETAL:               Right middle finger-  2 plus radial  artery and ulnar artery pulses, light touch intact Right upper extremity.  All digits are warm. No erythema, no warmth, no  drainage, No swelling, no significant tenderness. Triggering and locking of the right middle finger    Left elbow-  2 plus radial  artery and ulnar artery pulses, light touch intact Right upper extremity.  All digits are warm. No erythema, no warmth, no drainage, No swelling,  significant tenderness Over the lateral epicondyle.Tenderness and pain with resisted wrist extension at the lateral epicondyle.         Assessment:           Diagnosis:              1.right  Middle trigger finger               2. Left lateral epicondylitis                   Diagnostic Studies  MRI-No  X-Ray-No  EMG/NCV-No  Arthrogram-No  Bone Scan-No  CT Scan-No  Doppler-No  ESR-No  CRP-No  CBC with Diff-No   Rheumatoid/Arthritis Panel-No      Plan:                                                 1. PT-no                                                 2.OT-Yes, left elbow                                          3.NSAID-no                                        4. Narcotics-no                                     5. Wound care-No                                 6. Rest-yes                                           7. Surgery-yes , Right middle trigger finger release                                        8. VAZQUEZ Hose-no                                    9. Anticoagulation therapy-no               10. Elevation-no                                     11. Crutches-no                                    12. Walker-no             13. Cane no                        14. Referral-no                                     15.Injection-no                            16. Splint Left elbow            17. RICE-none            18. Follow up- 3 weeks

## 2017-04-20 NOTE — PATIENT INSTRUCTIONS
Treating Trigger Finger     The tendon sheath is opened to release the tendon. Once the tendon can move freely again, the finger can bend and straighten more normally.     Trigger finger occurs when the tissue inside your finger or thumb becomes inflamed. Mild cases can be treated without surgery. If the problem is severe, surgery may be needed. Your doctor will discuss your options with you.  Nonsurgical treatment  For mild symptoms, your doctor may have you rest the finger or thumb. You may also be told to take anti-inflammatory medicines. These include ibuprofen or aspirin. You may be given an injection of medicine in the base of the finger or thumb. This typically is a steroid, such as cortisone.  Surgery  If nonsurgical treatments dont ease your symptoms, surgery may be recommended. A tendon is a cordlike fiber that attaches muscle to bone and allows joints to bend. The tendon is surrounded by a protective cover called a sheath. During surgery, the sheath in your finger or thumb is opened to enlarge the space and release the swollen tendon. This allows the finger or thumb to bend and straighten normally. Surgery takes about 20 minutes. It can often be done using a local anesthetic. You may be able to go home the same day. Your hand will be wrapped in a soft bandage. You may need to wear a plaster splint for a short time to keep the finger or thumb still as it heals. The stitches will be removed in about 2 weeks. Your doctor can discuss the risks and benefits of surgery with you.  Date Last Reviewed: 9/21/2015 © 2000-2016 [employer]. [address] [place] [address], [place] [address]. All rights reserved. This information is not intended as a substitute for professional medical care. Always follow your healthcare professional's instructions.

## 2017-04-20 NOTE — TELEPHONE ENCOUNTER
Return call in rg to knot at surgical site, no answer left message via v/m to call office. Will cont to contact pt

## 2017-04-20 NOTE — TELEPHONE ENCOUNTER
----- Message from Mary Mirian sent at 4/20/2017 11:33 AM CDT -----  Pt at [phone]//states she had surgery in February 13. 2017//she feels alittle knot near her navel and is concerned//please call to discuss//thanks/[facility]

## 2017-04-21 DIAGNOSIS — G47.00 INSOMNIA, UNSPECIFIED TYPE: ICD-10-CM

## 2017-04-24 RX ORDER — ESZOPICLONE 3 MG/1
TABLET, FILM COATED ORAL
Qty: 90 TABLET | Refills: 0 | Status: SHIPPED | OUTPATIENT
Start: 2017-04-24 | End: 2017-07-02 | Stop reason: SDUPTHER

## 2017-04-25 DIAGNOSIS — Z01.818 PRE-OP TESTING: Primary | ICD-10-CM

## 2017-04-26 DIAGNOSIS — M25.561 CHRONIC PAIN OF RIGHT KNEE: Primary | ICD-10-CM

## 2017-04-26 DIAGNOSIS — G89.29 CHRONIC PAIN OF RIGHT KNEE: Primary | ICD-10-CM

## 2017-05-09 ENCOUNTER — NUTRITION (OUTPATIENT)
Dept: DIABETES | Facility: CLINIC | Age: 67
End: 2017-05-09
Payer: MEDICARE

## 2017-05-09 ENCOUNTER — OFFICE VISIT (OUTPATIENT)
Dept: SURGERY | Facility: CLINIC | Age: 67
End: 2017-05-09
Payer: MEDICARE

## 2017-05-09 ENCOUNTER — OFFICE VISIT (OUTPATIENT)
Dept: PODIATRY | Facility: CLINIC | Age: 67
End: 2017-05-09
Payer: MEDICARE

## 2017-05-09 VITALS
HEART RATE: 74 BPM | WEIGHT: 181 LBS | HEIGHT: 63 IN | BODY MASS INDEX: 32.19 KG/M2 | WEIGHT: 181.69 LBS | SYSTOLIC BLOOD PRESSURE: 100 MMHG | HEIGHT: 63 IN | BODY MASS INDEX: 32.07 KG/M2 | DIASTOLIC BLOOD PRESSURE: 65 MMHG

## 2017-05-09 VITALS
HEIGHT: 63 IN | DIASTOLIC BLOOD PRESSURE: 65 MMHG | HEART RATE: 83 BPM | BODY MASS INDEX: 32.19 KG/M2 | SYSTOLIC BLOOD PRESSURE: 102 MMHG | WEIGHT: 181.69 LBS | TEMPERATURE: 98 F

## 2017-05-09 DIAGNOSIS — E66.9 OBESITY (BMI 30-39.9): ICD-10-CM

## 2017-05-09 DIAGNOSIS — Z98.84 STATUS POST LAPAROSCOPIC SLEEVE GASTRECTOMY: Primary | ICD-10-CM

## 2017-05-09 DIAGNOSIS — B35.1 DERMATOPHYTOSIS OF NAIL: Primary | ICD-10-CM

## 2017-05-09 DIAGNOSIS — M21.611 BILATERAL BUNIONS: ICD-10-CM

## 2017-05-09 DIAGNOSIS — E66.01 MORBID OBESITY DUE TO EXCESS CALORIES: ICD-10-CM

## 2017-05-09 DIAGNOSIS — M21.612 BILATERAL BUNIONS: ICD-10-CM

## 2017-05-09 PROCEDURE — 3078F DIAST BP <80 MM HG: CPT | Mod: S$GLB,,, | Performed by: PODIATRIST

## 2017-05-09 PROCEDURE — 1126F AMNT PAIN NOTED NONE PRSNT: CPT | Mod: S$GLB,,, | Performed by: PODIATRIST

## 2017-05-09 PROCEDURE — 99213 OFFICE O/P EST LOW 20 MIN: CPT | Mod: S$GLB,,, | Performed by: PODIATRIST

## 2017-05-09 PROCEDURE — 99999 PR PBB SHADOW E&M-EST. PATIENT-LVL III: CPT | Mod: PBBFAC,,, | Performed by: DIETITIAN, REGISTERED

## 2017-05-09 PROCEDURE — 1160F RVW MEDS BY RX/DR IN RCRD: CPT | Mod: S$GLB,,, | Performed by: PODIATRIST

## 2017-05-09 PROCEDURE — 99999 PR PBB SHADOW E&M-EST. PATIENT-LVL III: CPT | Mod: PBBFAC,,, | Performed by: PODIATRIST

## 2017-05-09 PROCEDURE — 97803 MED NUTRITION INDIV SUBSEQ: CPT | Mod: S$GLB,,, | Performed by: DIETITIAN, REGISTERED

## 2017-05-09 PROCEDURE — 99999 PR PBB SHADOW E&M-EST. PATIENT-LVL III: CPT | Mod: PBBFAC,,, | Performed by: SURGERY

## 2017-05-09 PROCEDURE — 3074F SYST BP LT 130 MM HG: CPT | Mod: S$GLB,,, | Performed by: PODIATRIST

## 2017-05-09 PROCEDURE — 99499 UNLISTED E&M SERVICE: CPT | Mod: S$GLB,,, | Performed by: PODIATRIST

## 2017-05-09 PROCEDURE — 99024 POSTOP FOLLOW-UP VISIT: CPT | Mod: S$GLB,,, | Performed by: SURGERY

## 2017-05-09 PROCEDURE — 1159F MED LIST DOCD IN RCRD: CPT | Mod: S$GLB,,, | Performed by: PODIATRIST

## 2017-05-09 NOTE — PROGRESS NOTES
PODIATRY NOTE    CHIEF COMPLAINT  Chief Complaint   Patient presents with    Diabetic Foot Exam     Next with PCP Dr. Maldonado 06/07/2017         HPI    SUBJECTIVE: Cassandra Campos is a 66 y.o. female w/ PMH of DM2,  and other medical problems who presents to clinic for high risk diabetic foot exam and care.  Patient denies numbness, burning, and/or tingling sensations in their feet. She complains about bilateral bunions. Pain is present only with certain shoes. She also complains about fungal toenails. No further pedal complaints.        HgA1c:   Hemoglobin A1C   Date Value Ref Range Status   02/13/2017 6.3 (H) 4.5 - 6.2 % Final     Comment:     According to ADA guidelines, hemoglobin A1C <7.0% represents  optimal control in non-pregnant diabetic patients.  Different  metrics may apply to specific populations.   Standards of Medical Care in Diabetes - 2016.  For the purpose of screening for the presence of diabetes:  <5.7%     Consistent with the absence of diabetes  5.7-6.4%  Consistent with increasing risk for diabetes   (prediabetes)  >or=6.5%  Consistent with diabetes  Currently no consensus exists for use of hemoglobin A1C  for diagnosis of diabetes for children.     11/28/2016 7.4 (H) 4.5 - 6.2 % Final     Comment:     According to ADA guidelines, hemoglobin A1C <7.0% represents  optimal control in non-pregnant diabetic patients.  Different  metrics may apply to specific populations.   Standards of Medical Care in Diabetes - 2016.  For the purpose of screening for the presence of diabetes:  <5.7%     Consistent with the absence of diabetes  5.7-6.4%  Consistent with increasing risk for diabetes   (prediabetes)  >or=6.5%  Consistent with diabetes  Currently no consensus exists for use of hemoglobin A1C  for diagnosis of diabetes for children.     06/15/2016 6.6 (H) 4.5 - 6.2 % Final       REVIEW OF SYSTEMS  General: Denies any fever or chills  Chest: Denies shortness of breath, wheezing, coughing, or sputum  "production  Heart: Denies chest pain.  As noted above and per history of current illness above, otherwise negative in the remainder of the 14 systems.     PHYSICAL EXAM  Vitals:    05/09/17 1401   BP: 100/65   Pulse: 74   Weight: 82.4 kg (181 lb 10.5 oz)   Height: 5' 3" (1.6 m)   PainSc: 0-No pain       GEN:  This patient is well-developed, well-nourished and appears stated age, well-oriented to person, place and time, and cooperative and pleasant on today's visit.      LOWER EXTREMITY  Vascular:   · DP pedal pulse 2/4 b/l, PT pedal pulse 2/4 b/l  · Skin temperature warm to warm from prox to distally  · CFT <5 secs b/l  · There is no  edema noted b/l.     Dermatologic:   · Thickened, dystrophic, elongated toenails with subungal debris 1-5 b/l.   · No open skin lesions noted  · No erythema or drainage noted b/l.  · Webspaces are C/D/I B/L.  · There is no hyperkeratotic tissue noted.  · Skin texture and turgor dry/shiny  · There is no pedal hair growth noted    Neurologic:  · Vibratory sensation diminished at level of Hallux IPJ b/l    · Protective sensation absent at 0/10 sites upon examination with [place] Weinsten 5.07 g monofilament.   · Propioception intact at 1st MTPJ b/l.   · Achilles and patellar deep tendon reflexes intact  · Babinski reflex absent b/l. Light touch and sharp/dull sensation intact b/l.    Musculoskeletal/Orthopedic:  · No symptomatic structural abnormalities noted.   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 Muscle tone is normal.  · Pain free range of motion in all four quadrants without stiffness and limitation b/l      ASSESSMENT  1. Dermatophytosis of nail  2.  HAV b/l     Plan:  -Discuss presenting problems, etiology, pathologic processes and management options with patient today.   -I counseled the patient on their conditions, their implications and medical management. An in depth " discussion on diabetic management, risk prevention, amputation prevention verbally and provided educational literature in written format.  -Currently bunion deformity is mildlysymptomatic and patient was guided on accommodating bunions appropriately with wider toe box shoes. Patient was also guided on over-the-counter anti-inflammatories and offloading cushion padding for any acute flareups. If conservative treatment fails, then due to osseous deformity we will discuss surgical intervention.  -vicks vapor rub for toenails    Future Appointments  Date Time Provider Department Center   5/30/2017 8:00 AM Twan Pelaez MD [facility] CARDIO [facility]   5/30/2017 9:00 AM HRA, [facility] IM [facility]   5/30/2017 10:20 AM LABORATORY, [facility] LAB [facility]   5/30/2017 10:30 AM [facility] XR2 [facility] XRAY [facility]   5/30/2017 11:15 AM ORTHOPEDICS NURSE, [facility] ORTHO [facility]   6/7/2017 10:40 AM Denia Maldonado MD [facility] PRIMARY [place]   6/27/2017 2:30 PM Jayro Pedraza Sr., MD [facility] ORTHO [facility]   11/14/2017 1:00 PM Rashaad Watson MD [facility] GENSUR [facility]         Report Electronically Signed By:  Deanna Mchugh DPM     Podiatric Medicine & Surgery  [facility]  5/9/2017

## 2017-05-09 NOTE — PROGRESS NOTES
Cassandra Campos 66 y.o.female  This patient was seen for postoperative visit. Cassandra Campos has no specific complaints and denies of any issues relevant to the surgery. The wound has healed without any complications.  I have discussed the pathology result with the patient. Cassandra Campos will follow up in 6 months.  Her initial preoperative weight was 208 pounds with BMI of 36.9 kg/m².  Today she weighs 181 pounds with BMI of 32.18 kg/m².  She feels extremely healthy and much active.    Rashaad Watson

## 2017-05-09 NOTE — MR AVS SNAPSHOT
[facility]  [address] [address]  Phone: [phone]  Fax: [phone]                  Cassandra Campos   2017 3:00 PM   Nutrition    Description:  Female : 1950   Provider:  Ryanne Avila RD, CDE   Department:  [facility]           Diagnoses this Visit        Comments    Uncontrolled type 2 diabetes mellitus with complication, without long-term current use of insulin    -  Primary     Morbid obesity due to excess calories                To Do List           Future Appointments        Provider Department Dept Phone    2017 3:00 PM Ryanne Avila RD, CDE [facility] [phone]    2017 8:00 AM Twan Pelaez MD [facility] Cardiology [phone]    2017 9:00 AM HRA [facility] [phone]    2017 10:20 AM LABORATORY, [facility] [phone]    2017 10:30 AM [facility] XR2 [facility] [phone]      Your Future Surgeries/Procedures     2017   Surgery with Jayro Pedraza Sr., MD   [facility]  ([facility])    [address] [place] [address] [phone]              Goals (5 Years of Data)     None      Follow-Up and Disposition     Return in about 6 months (around 2017), or E11.8, E66.01 [place]; [facility] pm .      [facility]     [facility] Care Line -  Assistance  Unless otherwise directed by your provider, please contact [facility], our nurse care line that is available for  assistance.     Registered nurses in the [facility] provide: appointment scheduling, clinical advisement, health education, and other advisory services.  Call: [phone] (toll free)               Medications           Message regarding Medications     Verify the changes and/or additions to your medication regime listed below are the same as discussed with  "your clinician today.  If any of these changes or additions are incorrect, please notify your healthcare provider.             Verify that the below list of medications is an accurate representation of the medications you are currently taking.  If none reported, the list may be blank. If incorrect, please contact your healthcare provider. Carry this list with you in case of emergency.           Current Medications     ACCU-CHEK FASTCLIX Misc USE ONE TIME DAILY    ACCU-CHEK SMARTVIEW TEST STRIP Strp USE TO TEST ONCE DAILY    ALBUTEROL SULFATE (VENTOLIN HFA INHL) Inhale 1 puff into the lungs every 4 (four) hours as needed.    amitriptyline (ELAVIL) 100 MG tablet TAKE 1 TABLET NIGHTLY    b complex vitamins tablet Take 1 tablet by mouth once daily.    blood-glucose meter kit Use as instructed    calcium citrate (CALCITRATE) 200 mg (950 mg) tablet Take 1 tablet by mouth once daily. Ca 630 mg/Vit D 500IU - Pt takes 1 tab three times daily    cyanocobalamin, vitamin B-12, (VITAMIN B-12) 50 mcg tablet Take 50 mcg by mouth once daily.    eszopiclone 3 mg Tab TAKE 1 TABLET EVERY NIGHT AS NEEDED    ferrous sulfate 325 (65 FE) MG EC tablet Take 18 mg by mouth once daily.     metformin (GLUCOPHAGE-XR) 500 MG 24 hr tablet TAKE 3 TABLETS ONE TIME DAILY    metoprolol succinate (TOPROL-XL) 50 MG 24 hr tablet Take 1 tablet (50 mg total) by mouth once daily.    multivitamin (ONE DAILY MULTIVITAMIN) per tablet Take 1 tablet by mouth 2 (two) times daily.     nabumetone (RELAFEN) 500 MG tablet Take 500 mg by mouth once daily.    omeprazole (PRILOSEC) 20 MG capsule Take 1 capsule (20 mg total) by mouth once daily.    pravastatin (PRAVACHOL) 40 MG tablet TAKE 1 TABLET ONE TIME DAILY           Clinical Reference Information           Your Vitals Were     Height Weight BMI          5' 3" (1.6 m) 82.1 kg (181 lb) 32.06 kg/m2        Allergies as of 5/9/2017     No Known Allergies      Immunizations Administered on Date of Encounter - 5/9/2017  "    None      Language Assistance Services     ATTENTION: Language assistance services are available, free of charge. Please call [phone].      ATENCIÓN: Si habla yanelis, tiene a machuca disposición servicios gratuitos de asistencia lingüística. Llame al [phone].     CHÚ Ý: N?u b?n nói Ti?ng Vi?t, có các d?ch v? h? tr? ngôn ng? mi?n phí dành cho b?n. G?i s? [phone].         [facility] complies with applicable Federal civil rights laws and does not discriminate on the basis of race, color, national origin, age, disability, or sex.

## 2017-05-09 NOTE — MR AVS SNAPSHOT
[facility]  [address] [facility] [address]  Phone: [phone]  Fax: [phone]                  Cassandra Campos   2017 1:20 PM   Office Visit    Description:  Female : 1950   Provider:  Rashaad Watson MD   Department:  [facility]           Reason for Visit     Post-op Evaluation                To Do List           Future Appointments        Provider Department Dept Phone    2017 2:00 PM Deanna Mchugh DPM [facility] - Podiatry [phone]    2017 3:00 PM Ryanne Avila RD, CDE [facility] - Diabetes Management [phone]    2017 8:00 AM Twan Pelaez MD [facility] Cardiology [phone]    2017 9:00 AM HRA, [facility] Internal Medicine [phone]    2017 10:20 AM LABORATORY, [facility] [phone]      Your Future Surgeries/Procedures     2017   Surgery with Jayro Pedraza Sr., MD   [facility]  ([facility])    [address] [address] [phone]              Goals (5 Years of Data)     None      [facility]     [facility] Care Line - 24/ Assistance  Unless otherwise directed by your provider, please contact [facility], our nurse care line that is available for  assistance.     Registered nurses in the [facility] provide: appointment scheduling, clinical advisement, health education, and other advisory services.  Call: [phone] (toll free)               Medications           Message regarding Medications     Verify the changes and/or additions to your medication regime listed below are the same as discussed with your clinician today.  If any of these changes or additions are incorrect, please notify your healthcare provider.             Verify that the below list of medications is an accurate representation of the medications you are currently taking.  If none reported, the list may be blank. If  "incorrect, please contact your healthcare provider. Carry this list with you in case of emergency.           Current Medications     ACCU-CHEK FASTCLIX Misc USE ONE TIME DAILY    ACCU-CHEK SMARTVIEW TEST STRIP Strp USE TO TEST ONCE DAILY    ALBUTEROL SULFATE (VENTOLIN HFA INHL) Inhale 1 puff into the lungs every 4 (four) hours as needed.    amitriptyline (ELAVIL) 100 MG tablet TAKE 1 TABLET NIGHTLY    b complex vitamins tablet Take 1 tablet by mouth once daily.    blood-glucose meter kit Use as instructed    calcium citrate (CALCITRATE) 200 mg (950 mg) tablet Take 1 tablet by mouth once daily. Ca 630 mg/Vit D 500IU - Pt takes 1 tab three times daily    cyanocobalamin, vitamin B-12, (VITAMIN B-12) 50 mcg tablet Take 50 mcg by mouth once daily.    eszopiclone 3 mg Tab TAKE 1 TABLET EVERY NIGHT AS NEEDED    ferrous sulfate 325 (65 FE) MG EC tablet Take 18 mg by mouth once daily.     metformin (GLUCOPHAGE-XR) 500 MG 24 hr tablet TAKE 3 TABLETS ONE TIME DAILY    metoprolol succinate (TOPROL-XL) 50 MG 24 hr tablet Take 1 tablet (50 mg total) by mouth once daily.    multivitamin (ONE DAILY MULTIVITAMIN) per tablet Take 1 tablet by mouth 2 (two) times daily.     nabumetone (RELAFEN) 500 MG tablet Take 500 mg by mouth once daily.    omeprazole (PRILOSEC) 20 MG capsule Take 1 capsule (20 mg total) by mouth once daily.    pravastatin (PRAVACHOL) 40 MG tablet TAKE 1 TABLET ONE TIME DAILY           Clinical Reference Information           Your Vitals Were     BP Pulse Temp Height Weight BMI    102/65 83 98.4 °F (36.9 °C) (Oral) 5' 3" (1.6 m) 82.4 kg (181 lb 10.5 oz) 32.18 kg/m2      Blood Pressure          Most Recent Value    BP  102/65      Allergies as of 5/9/2017     No Known Allergies      Immunizations Administered on Date of Encounter - 5/9/2017     None      Language Assistance Services     ATTENTION: Language assistance services are available, free of charge. Please call [phone].      ATENCIÓN: Marbella hilario, " tiene a machuca disposición servicios gratuitos de asistencia lingüística. Llame al [phone].     SHORTY Ý: N?u b?n nói Ti?ng Vi?t, có các d?ch v? h? tr? ngôn ng? mi?n phí dành cho b?n. G?i s? [phone].         [facility] - General Surgery complies with applicable Federal civil rights laws and does not discriminate on the basis of race, color, national origin, age, disability, or sex.

## 2017-05-09 NOTE — PROGRESS NOTES
PCP: Denia Maldonado MD  REFERRING PROVIDER:  Rashaad Watson MD      HISTORY OF PRESENT ILLNESS:  66 y.o. female patient is in clinic today for s/p bariatric surgery. Since surgery 2/17, pt wt decreased 18 lbs.  Avg BG .    LAB: Results reviewed    SELF-MONITORING: Glucometer - accuchek; Food - none are avail    ACTIVITY LEVEL: Walk 2-3miles 50-70 min daily      NUTRITION INTAKE: Meal patterns include 3 meals, 3 snacks daily with intake 800-1000 cals/d. Adequate protein, vit/min supplementation.   B - banana, protein shake (pure protein) - water  S - premier protein bar  L - 2 chix legs (no skin) or fish or shrimp (2-3 oz) w/ 1/2-1 cup vegetable (tomato, greens, asparagus)    S - fruit (pear 1 small, arnel or watermelon)  D - leftover OR sardines, salmon on wheat cracker (4)  S -  fruit (pear 1 small, arnel or watermelon)  Beverages - water, crystal light  DIning out - rare    PSYCHOSOCIAL: Stage of change - action; Barriers to change - none    MNT ASSESSMENT:   600-800 calories, 80 ounces protein daily  low-fat, low-sodium  150 min physical activity per week, moderate intensity, as tolerated  Protein, vit/min supplementation     PLAN:  · Encouraged daily self-monitoring of glucose, food and activity patterns, return records to clinic.   · Reviewed glucose goals. Discussed prevention, identification and treatment of hyperglycemia and hypoglycemia and when to contact clinic. Instructed to test glucose fasting, before supper or at bedtime. Reviewed action of diabetes medications and to continue taking as prescribed.  · Provided post surgery meal-planning instruction and support. Encouraged progress and to continue daily protein shakes 2-3 serv/d and vit/min supplements as directed.                              · Discussed physical activity with review of benefits, methods, precautions. Encouraged progress.   · Discussed bx strategies for improving, strategies for improving social & environmental support of  lifestyle changes.  · Reviewed ADA goals, progress.    GOALS: Daily glucose, food & activity journal. Meal plan-90% accuracy. Activity-150 minutes per week.   Visit Time Spent:  30 min    Thank you for the opportunity to work with your patient.

## 2017-05-22 ENCOUNTER — TELEPHONE (OUTPATIENT)
Dept: ORTHOPEDICS | Facility: CLINIC | Age: 67
End: 2017-05-22

## 2017-05-22 NOTE — TELEPHONE ENCOUNTER
----- Message from Rosibel Lorenz sent at 5/22/2017  2:53 PM CDT -----  Contact: pt  Pt states she is returning a missed call, pt can be reached at [phone]//thxMW

## 2017-05-24 ENCOUNTER — PATIENT OUTREACH (OUTPATIENT)
Dept: ADMINISTRATIVE | Facility: HOSPITAL | Age: 67
End: 2017-05-24

## 2017-05-24 DIAGNOSIS — E11.9 DIABETES MELLITUS WITHOUT COMPLICATION: Primary | ICD-10-CM

## 2017-05-25 ENCOUNTER — OFFICE VISIT (OUTPATIENT)
Dept: ORTHOPEDICS | Facility: CLINIC | Age: 67
End: 2017-05-25
Payer: MEDICARE

## 2017-05-25 VITALS
BODY MASS INDEX: 32.11 KG/M2 | HEIGHT: 63 IN | WEIGHT: 181.19 LBS | HEART RATE: 74 BPM | DIASTOLIC BLOOD PRESSURE: 60 MMHG | SYSTOLIC BLOOD PRESSURE: 98 MMHG

## 2017-05-25 DIAGNOSIS — M67.361 TRANSIENT SYNOVITIS OF RIGHT KNEE: ICD-10-CM

## 2017-05-25 DIAGNOSIS — M94.261 CHONDROMALACIA OF RIGHT KNEE: Primary | ICD-10-CM

## 2017-05-25 DIAGNOSIS — M65.30 TRIGGER FINGER, UNSPECIFIED FINGER, UNSPECIFIED LATERALITY: ICD-10-CM

## 2017-05-25 PROCEDURE — 1159F MED LIST DOCD IN RCRD: CPT | Mod: S$GLB,,, | Performed by: ORTHOPAEDIC SURGERY

## 2017-05-25 PROCEDURE — 1126F AMNT PAIN NOTED NONE PRSNT: CPT | Mod: S$GLB,,, | Performed by: ORTHOPAEDIC SURGERY

## 2017-05-25 PROCEDURE — 99999 PR PBB SHADOW E&M-EST. PATIENT-LVL III: CPT | Mod: PBBFAC,,, | Performed by: ORTHOPAEDIC SURGERY

## 2017-05-25 PROCEDURE — 99214 OFFICE O/P EST MOD 30 MIN: CPT | Mod: S$GLB,,, | Performed by: ORTHOPAEDIC SURGERY

## 2017-05-25 NOTE — PATIENT INSTRUCTIONS
What is Trigger Finger?  Trigger finger is an inflammation of tissue inside your finger or thumb. It is also called tenosynovitis (ten-oh-sin-oh-VY-tis). Tendons (cordlike fibers that attach muscle to bone and allow you to bend the joints) become swollen. So does the synovium (a slick membrane that allows the tendons to move easily). This makes it difficult to straighten the finger or thumb.    Causes  Repeated use of a tool with strong gripping, such as a drill or wrench, can irritate and inflame the tendons and the synovium. It is also more common in certain medical conditions such as rheumatoid arthritis, gout, and diabetes. But often the cause of trigger finger is unknown.  Inside your finger  Tendons connect muscles in your forearm to the bones in your fingers. The tendons in each finger are surrounded by a protective tendon sheath. This sheath is lined with synovium, which produces a fluid that allows the tendons to slide easily when you bend and straighten the finger. If a tendon is irritated, it becomes inflamed.  When a tendon is inflamed  When a tendon is inflamed, it causes the lining of the tendon sheath to swell and thicken. Or the tendon itself may thicken. Then the sheath pinches the tendon, and the tendon can no longer slide easily inside the sheath. When you straighten your finger, the tendon sticks or locks as it tries to squeeze back through the sheath.    Symptoms  The first sign of trigger finger may be pain where the finger or thumb joins the palm. You may also notice some swelling. As the tendon becomes more inflamed, the finger may start to catch when you try to straighten it. When the locked tendon releases, the finger jumps, as if you were releasing the trigger of a gun. This further irritates the tendon, and may set up a cycle of catching and swelling.   Date Last Reviewed: 9/28/2015  © 8806-6438 [employer]. [address] [place] [address], [place] [address]. All rights reserved.  This information is not intended as a substitute for professional medical care. Always follow your healthcare professional's instructions.        Knee Arthroscopy  Knee problems can often be diagnosed and treated with a technique called arthroscopy. This type of surgery is done using an instrument called an arthroscope (scope). Only a few small incisions are needed for this surgery. The procedure can be used to diagnose a knee problem. In many cases, treatment can also be done using arthroscopy.     Insertion of fluid, arthroscope, and instruments through small incisions (portals).         Patient undergoes procedure      The arthroscope  The scope allows the doctor to look directly into the knee joint. It is about the size of a pencil and contains a pathway for fluids. It also contains coated glass fibers that beam an intense, cool light into the knee joint. A camera is attached to the scope as well. It provides clear images of most areas in your knee joint. The doctor views these images on a monitor.  Preparing for the procedure  · Have lab or other testing done as advised.  · Tell your doctor about any medicines or supplements you take  · Do not eat or drink anything for 10 hours before the procedure.  · Once you arrive for surgery, you will be given an IV line in your arm or hand. This provides fluids and medicines.  · To keep you free of pain during the surgery, youll receive medicine called anesthesia. You may have:  ¨ General anesthesia. This puts you into a deep sleep during the surgery.  ¨ Regional anesthesia. This numbs the body from the waist down.  ¨ Local anesthesia. This numbs just the knee.  In addition to regional or local anesthesia, you may receive sedation. This medicine makes you relaxed and sleepy during the surgery.  The procedure  · A few small incisions (portals) are made in your knee.  · The scope is inserted through one of the portals.  · Sterile fluid is put into the knee joint. This  makes it easier to see and work inside your joint.  · Using the scope, the doctor confirms the type and degree of knee damage. If possible, the problem is treated at this time. This is done using surgical tools put through the other portals.  · When the surgery is done, all tools are removed. The incisions are closed with sutures, staples, surgical glue, or strips of surgical tape.  Risks and complications of arthroscopy  All surgeries have risks. The risks of arthroscopy include:  · Bleeding  · Infection  · Blood clots  · Swelling and stiffness of the knee  · Injury to normal tissue  · Continuing knee problems   Date Last Reviewed: 9/20/2015  © 1856-8816 [employer]. [address] [place] [address], [place] [address]. All rights reserved. This information is not intended as a substitute for professional medical care. Always follow your healthcare professional's instructions.

## 2017-05-25 NOTE — PROGRESS NOTES
CC:This is a 66-year-old female that complains of right Middle finger triggering and left elbow pain.  She also complains of right knee pain.    HPI:The patient has had  Persistent right middle finger triggering for over 6 months as well as left elbow tenderness and pain.    PMH:    Past Medical History:   Diagnosis Date    Borderline glaucoma     COPD (chronic obstructive pulmonary disease)     pt states she does not have copd    Diabetes mellitus, type 2     Gastritis     Hydradenitis     Hyperlipidemia     Hypertension     Insomnia     Migraines 2000    Nasal septum perforation     Sleep apnea     SVT (supraventricular tachycardia) 2013    Type II or unspecified type diabetes mellitus without mention of complication, not stated as uncontrolled 2013       PSH:    Past Surgical History:   Procedure Laterality Date    AXILLARY HIDRADENITIS EXCISION      BREAST LUMPECTOMY Bilateral 1998    Benign    CARPAL TUNNEL RELEASE      bilateral    CATARACT EXTRACTION      OU     SECTION      CHOLECYSTECTOMY  2014    CYST REMOVAL  2015    sebaceous cyst removed from face    TONSILLECTOMY, ADENOIDECTOMY      TRIGGER FINGER RELEASE Right 2015    Dr. Pedraza       Family Hx:    Family History   Problem Relation Age of Onset    Prostate cancer Brother     Diabetes Maternal Aunt        Allergy:  Review of patient's allergies indicates:  No Known Allergies    Medication:    Current Outpatient Prescriptions:     ACCU-CHEK FASTCLIX Misc, USE ONE TIME DAILY, Disp: 100 each, Rfl: 3    ACCU-CHEK SMARTVIEW TEST STRIP Strp, USE TO TEST ONCE DAILY, Disp: 100 strip, Rfl: 3    ALBUTEROL SULFATE (VENTOLIN HFA INHL), Inhale 1 puff into the lungs every 4 (four) hours as needed., Disp: , Rfl:     amitriptyline (ELAVIL) 100 MG tablet, TAKE 1 TABLET NIGHTLY, Disp: 90 tablet, Rfl: 3    b complex vitamins tablet, Take 1 tablet by mouth once daily., Disp: , Rfl:     blood-glucose  meter kit, Use as instructed, Disp: 1 each, Rfl: 0    calcium citrate (CALCITRATE) 200 mg (950 mg) tablet, Take 1 tablet by mouth once daily. Ca 630 mg/Vit D 500IU - Pt takes 1 tab three times daily, Disp: , Rfl:     cyanocobalamin, vitamin B-12, (VITAMIN B-12) 50 mcg tablet, Take 50 mcg by mouth once daily., Disp: , Rfl:     eszopiclone 3 mg Tab, TAKE 1 TABLET EVERY NIGHT AS NEEDED, Disp: 90 tablet, Rfl: 0    ferrous sulfate 325 (65 FE) MG EC tablet, Take 18 mg by mouth once daily. , Disp: , Rfl:     metformin (GLUCOPHAGE-XR) 500 MG 24 hr tablet, TAKE 3 TABLETS ONE TIME DAILY, Disp: 270 tablet, Rfl: 0    metoprolol succinate (TOPROL-XL) 50 MG 24 hr tablet, Take 1 tablet (50 mg total) by mouth once daily., Disp: 90 tablet, Rfl: 1    multivitamin (ONE DAILY MULTIVITAMIN) per tablet, Take 1 tablet by mouth 2 (two) times daily. , Disp: , Rfl:     nabumetone (RELAFEN) 500 MG tablet, Take 500 mg by mouth once daily., Disp: , Rfl:     omeprazole (PRILOSEC) 20 MG capsule, Take 1 capsule (20 mg total) by mouth once daily. (Patient taking differently: Take 20 mg by mouth every evening. ), Disp: 90 capsule, Rfl: 1    pravastatin (PRAVACHOL) 40 MG tablet, TAKE 1 TABLET ONE TIME DAILY (Patient taking differently: Take 40 mg by mouth every evening. TAKE 1 TABLET ONE TIME DAILY), Disp: 30 tablet, Rfl: 0    Social History:    Social History     Social History    Marital status:      Spouse name: N/A    Number of children: 1    Years of education: N/A     Occupational History     aid      Social History Main Topics    Smoking status: Former Smoker     Packs/day: 0.50     Years: 30.00     Types: Cigarettes     Start date: 1/1/2012    Smokeless tobacco: Never Used    Alcohol use Yes      Comment: rarely // wine  No alcohol prior to surgery    Drug use: No    Sexual activity: Not Currently     Other Topics Concern    Not on file     Social History Narrative    Single, part-time teacher.  "Masters degree biology.        Vitals:     BP 98/60 (BP Location: Right arm, Patient Position: Sitting, BP Method: Automatic)   Pulse 74   Ht 5' 3" (1.6 m)   Wt 82.2 kg (181 lb 3.5 oz)   BMI 32.10 kg/m²      ROS:  GENERAL: No fever, chills, fatigability or weight loss.  SKIN: No rashes, itching or changes in color or texture of skin.  HEAD: No headaches or recent head trauma.  EYES: Visual acuity fine. No photophobia, ocular pain or diplopia.  EARS: Denies ear pain, discharge or vertigo.  NOSE: No loss of smell, no epistaxis or postnasal drip.  MOUTH & THROAT: No hoarseness or change in voice. No excessive gum bleeding.  NODES: Denies swollen glands.  CHEST: Denies BOGGS, cyanosis, wheezing, cough and sputum production.  CARDIOVASCULAR: Denies chest pain, PND, orthopnea or reduced exercise tolerance.  ABDOMEN: Appetite fine. No weight loss. Denies diarrhea, abdominal pain, hematemesis or blood in stool.  URINARY: No flank pain, dysuria or hematuria.  PERIPHERAL VASCULAR: No claudication or cyanosis.  NEUROLOGIC: No history of seizures, paralysis, alteration of gait or coordination.  MUSCULOSKELETAL: See HPI    PE:  APPEARANCE: Well nourished, well developed, in no acute distress.   HEAD: Normocephalic, atraumatic.  EYES: PERRL. EOMI.   EARS: TM's intact. Light reflex normal. No retraction or perforation.   NOSE: Mucosa pink. Airway clear.  MOUTH & THROAT: No tonsillar enlargement. No pharyngeal erythema or exudate. No stridor.  NECK: Supple.   NODES: No cervical, axillary or inguinal lymph node enlargement.  CHEST: Lungs clear to auscultation.  CARDIOVASCULAR: Normal S1, S2. No rubs, murmurs or gallops.  ABDOMEN: Bowel sounds normal. Not distended. Soft. No tenderness or masses.  NEUROLOGIC: Cranial Nerves: II-XII grossly intact, also see MUSCULOSKELETAL  MUSCULOSKELETAL:               Right middle finger-  2 plus radial  artery and ulnar artery pulses, light touch intact Right upper extremity.  All digits are " warm. No erythema, no warmth, no drainage, No swelling, no significant tenderness. Triggering and locking of the right middle finger    Left elbow-  2 plus radial  artery and ulnar artery pulses, light touch intact Right upper extremity.  All digits are warm. No erythema, no warmth, no drainage, No swelling,  significant tenderness Over the lateral epicondyle.Tenderness and pain with resisted wrist extension at the lateral epicondyle.       Right  Knee Exam-abnormal    Gait-abnormal  Muscle Appearance:abnormal  Grooming:normal  Spine Alignment-normal  Muscle Atrophy-Positive  Deformities-Negative  Tenderness-Positive  Paresthesias-Negative  Range of Motion         Ext-normal, 0 degrees         Flex-abnormal  Muscle Strength-abnormal  Sensation-normal  Reflexes-normal  Crepitus-Positive                                Swelling-Negative  Effusion- Positive                                Edema-Negative  Lachman-Negative                                Erythema-Negative  [facility]'s-Positive                              Apley Grind-Positive  Patellar Comp-Positive                         Alignment-normal/symmetric  Patellar Apprehension-Negative              Synovial fullness-Positive  Passive Patellar Tilt-normal  Patellar Tracking-normal   Patellar Glide-normal  Q-Angle at 90 degrees-normal  Patellar Grind-abnormal  [id number]  Fatigue-Negative                                     HS Tightness-Negative  Tests on Exam, No ligamentous laxity  Neurovascular Status-normal+2 DP and PT artery pulses  Skin-normal       Assessment:           Diagnosis:              1.right  Middle trigger finger               2. Left lateral epicondylitis               3. Right knee chondromalacia and synovitis    Diagnostic Studies  MRI-No  X-Ray-No  EMG/NCV-No  Arthrogram-No  Bone Scan-No  CT Scan-No  Doppler-No  ESR-No  CRP-No  CBC with Diff-No   Rheumatoid/Arthritis Panel-No      Plan:                                                 1.  PT-no                                                 2.OT-Yes, left elbow                                          3.NSAID-no                                        4. Narcotics-no                                     5. Wound care-No                                 6. Rest-yes                                           7. Surgery-yes , Right middle trigger finger release and right knee arthroscope.                                       8. VAZQUEZ Hose-no                                    9. Anticoagulation therapy-no               10. Elevation-no                                     11. Crutches-no                                    12. Walker-no             13. Cane no                        14. Referral-no                                     15.Injection-no                            16. Splint Left elbow            17. RICE-none            18. Follow up- 3 weeks

## 2017-05-30 ENCOUNTER — OFFICE VISIT (OUTPATIENT)
Dept: INTERNAL MEDICINE | Facility: CLINIC | Age: 67
End: 2017-05-30
Payer: MEDICARE

## 2017-05-30 ENCOUNTER — OFFICE VISIT (OUTPATIENT)
Dept: CARDIOLOGY | Facility: CLINIC | Age: 67
End: 2017-05-30
Payer: MEDICARE

## 2017-05-30 ENCOUNTER — HOSPITAL ENCOUNTER (OUTPATIENT)
Dept: RADIOLOGY | Facility: HOSPITAL | Age: 67
Discharge: HOME | End: 2017-05-30
Attending: ORTHOPAEDIC SURGERY
Payer: MEDICARE

## 2017-05-30 VITALS
HEIGHT: 63 IN | WEIGHT: 182.75 LBS | BODY MASS INDEX: 32.38 KG/M2 | HEART RATE: 76 BPM | SYSTOLIC BLOOD PRESSURE: 108 MMHG | DIASTOLIC BLOOD PRESSURE: 64 MMHG

## 2017-05-30 VITALS
OXYGEN SATURATION: 97 % | HEIGHT: 63 IN | HEART RATE: 76 BPM | WEIGHT: 182.75 LBS | BODY MASS INDEX: 32.38 KG/M2 | SYSTOLIC BLOOD PRESSURE: 110 MMHG | DIASTOLIC BLOOD PRESSURE: 68 MMHG

## 2017-05-30 DIAGNOSIS — E11.69 HYPERLIPIDEMIA ASSOCIATED WITH TYPE 2 DIABETES MELLITUS: ICD-10-CM

## 2017-05-30 DIAGNOSIS — Z01.818 PRE-OP TESTING: ICD-10-CM

## 2017-05-30 DIAGNOSIS — G47.00 INSOMNIA, UNSPECIFIED TYPE: ICD-10-CM

## 2017-05-30 DIAGNOSIS — M67.361 TRANSIENT SYNOVITIS OF RIGHT KNEE: ICD-10-CM

## 2017-05-30 DIAGNOSIS — K21.9 GASTROESOPHAGEAL REFLUX DISEASE, ESOPHAGITIS PRESENCE NOT SPECIFIED: Chronic | ICD-10-CM

## 2017-05-30 DIAGNOSIS — E78.5 HYPERLIPIDEMIA ASSOCIATED WITH TYPE 2 DIABETES MELLITUS: ICD-10-CM

## 2017-05-30 DIAGNOSIS — J84.10 CALCIFIED GRANULOMA OF LUNG: ICD-10-CM

## 2017-05-30 DIAGNOSIS — E66.9 OBESITY (BMI 30.0-34.9): ICD-10-CM

## 2017-05-30 DIAGNOSIS — Z01.810 PREOP CARDIOVASCULAR EXAM: ICD-10-CM

## 2017-05-30 DIAGNOSIS — I47.10 PAROXYSMAL SVT (SUPRAVENTRICULAR TACHYCARDIA): Primary | ICD-10-CM

## 2017-05-30 DIAGNOSIS — Z00.00 ENCOUNTER FOR PREVENTIVE HEALTH EXAMINATION: Primary | ICD-10-CM

## 2017-05-30 DIAGNOSIS — E11.9 CONTROLLED TYPE 2 DIABETES MELLITUS WITHOUT COMPLICATION, WITHOUT LONG-TERM CURRENT USE OF INSULIN: ICD-10-CM

## 2017-05-30 DIAGNOSIS — E78.2 MIXED HYPERLIPIDEMIA: ICD-10-CM

## 2017-05-30 DIAGNOSIS — M85.80 OSTEOPENIA, UNSPECIFIED LOCATION: ICD-10-CM

## 2017-05-30 DIAGNOSIS — I10 ESSENTIAL HYPERTENSION: ICD-10-CM

## 2017-05-30 DIAGNOSIS — M65.331 TRIGGER MIDDLE FINGER OF RIGHT HAND: ICD-10-CM

## 2017-05-30 DIAGNOSIS — I47.10 PAROXYSMAL SVT (SUPRAVENTRICULAR TACHYCARDIA): ICD-10-CM

## 2017-05-30 DIAGNOSIS — B35.1 ONYCHOMYCOSIS OF MULTIPLE TOENAILS WITH TYPE 2 DIABETES MELLITUS: ICD-10-CM

## 2017-05-30 DIAGNOSIS — E11.69 ONYCHOMYCOSIS OF MULTIPLE TOENAILS WITH TYPE 2 DIABETES MELLITUS: ICD-10-CM

## 2017-05-30 PROBLEM — M77.12 LATERAL EPICONDYLITIS OF LEFT ELBOW: Status: RESOLVED | Noted: 2017-04-20 | Resolved: 2017-05-30

## 2017-05-30 PROBLEM — J98.4 CALCIFIED GRANULOMA OF LUNG: Status: ACTIVE | Noted: 2017-05-30

## 2017-05-30 PROCEDURE — 1159F MED LIST DOCD IN RCRD: CPT | Mod: S$GLB,,, | Performed by: INTERNAL MEDICINE

## 2017-05-30 PROCEDURE — 99213 OFFICE O/P EST LOW 20 MIN: CPT | Mod: S$GLB,,, | Performed by: INTERNAL MEDICINE

## 2017-05-30 PROCEDURE — 99499 UNLISTED E&M SERVICE: CPT | Mod: S$GLB,,, | Performed by: PHYSICIAN ASSISTANT

## 2017-05-30 PROCEDURE — 99999 PR PBB SHADOW E&M-EST. PATIENT-LVL III: CPT | Mod: PBBFAC,,, | Performed by: INTERNAL MEDICINE

## 2017-05-30 PROCEDURE — G0439 PPPS, SUBSEQ VISIT: HCPCS | Mod: S$GLB,,, | Performed by: PHYSICIAN ASSISTANT

## 2017-05-30 PROCEDURE — 93000 ELECTROCARDIOGRAM COMPLETE: CPT | Mod: S$GLB,,, | Performed by: INTERNAL MEDICINE

## 2017-05-30 PROCEDURE — 99999 PR PBB SHADOW E&M-EST. PATIENT-LVL IV: CPT | Mod: PBBFAC,,, | Performed by: PHYSICIAN ASSISTANT

## 2017-05-30 PROCEDURE — 99499 UNLISTED E&M SERVICE: CPT | Mod: S$GLB,,, | Performed by: INTERNAL MEDICINE

## 2017-05-30 PROCEDURE — 71020 XR CHEST PA AND LATERAL PRE-OP: CPT | Mod: 26,,, | Performed by: RADIOLOGY

## 2017-05-30 NOTE — PATIENT INSTRUCTIONS
Counseling and Referral of Other Preventative  (Italic type indicates deductible and co-insurance are waived)    Patient Name: Cassandra Campos  Today's Date: 5/30/2017      SERVICE LIMITATIONS RECOMMENDATION    Vaccines    · Pneumococcal (once after 65)    · Influenza (annually)    · Hepatitis B (if medium/high risk)    · Prevnar 13      Hepatitis B medium/high risk factors:       - End-stage renal disease       - Hemophiliacs who received Factor VII or         IX concentrates       - Clients of institutions for the mentally             retarded       - Persons who live in the same house as          a HepB carrier       - Homosexual men       - Illicit injectable drug abusers     Pneumococcal: done 7/2013   Influenza:10/2016 Done, repeat in one year     Hepatitis B: N/A     Prevnar 13:76/2016 Done, no repeat necessary    Mammogram (biennial age 50-74)  Annually (age 40 or over)  Last done 11/2016, recommend to repeat every 1  years    Pap (up to age 70 and after 70 if unknown history or abnormal study last 10 years)    up to date per pt. Continue follow with PCP     The USPSTF recommends screening for cervical cancer in women age 21 to 65 years with cytology (Pap smear) every 3 years.     Colorectal cancer screening (to age 75)    · Fecal occult blood test (annual)  · Flexible sigmoidoscopy (5y)  · Screening colonoscopy (10y)  · Barium enema   done 2010. Continue follow PCP and GI    Diabetes self-management training (no USPSTF recommendations)  Requires referral by treating physician for patient with diabetes or renal disease. 10 hours of initial DSMT sessions of no less than 30 minutes each in a continuous 12-month period. 2 hours of follow-up DSMT in subsequent years.  Done this year, repeat every year    Bone mass measurements (age 65 & older, biennial)  Requires diagnosis related to osteoporosis or estrogen deficiency. Biennial benefit unless patient has history of long-term glucocorticoid  Last done 11/2015,  recommend to repeat every 5  years    Glaucoma screening (no USPSTF recommendation)  Diabetes mellitus, family history   , age 50 or over    American, age 65 or over  done 7/2016. Follows Dr. Lawrence    Medical nutrition therapy for diabetes or renal disease (no recommended schedule)  Requires referral by treating physician for patient with diabetes or renal disease or kidney transplant within the past 3 years.  Can be provided in same year as diabetes self-management training (DSMT), and CMS recommends medical nutrition therapy take place after DSMT. Up to 3 hours for initial year and 2 hours in subsequent years.  Follow PCP    Cardiovascular screening blood tests (every 5 years)  · Fasting lipid panel  Order as a panel if possible  Done this year, repeat every year. Follow PCP and cardiologist, Dr. Pelaez    Diabetes screening tests (at least every 3 years, Medicare covers annually or at 6-month intervals for prediabetic patients)  · Fasting blood sugar (FBS) or glucose tolerance test (GTT)  Patient must be diagnosed with one of the following:       - Hypertension       - Dyslipidemia       - Obesity (BMI 30kg/m2)       - Previous elevated impaired FBS or GTT       ... or any two of the following:       - Overweight (BMI 25 but <30)       - Family history of diabetes       - Age 65 or older       - History of gestational diabetes or birth of baby weighing more than 9 pounds  Done this year, repeat every year    Abdominal aortic aneurysm screening (once)  · Sonogram   Limited to patients who meet one of the following criteria:       - Men who are 65-75 years old and have smoked more than 100 cigarette in their lifetime       - Anyone with a family history of abdominal aortic aneurysm       - Anyone recommended for screening by the USPSTF  follow PCP    HIV screening (annually for increased risk patients)  · HIV-1 and HIV-2 by EIA, or MELIDA, rapid antibody test or oral mucosa transudate   Patients must be at increased risk for HIV infection per USPSTF guidelines or pregnant. Tests covered annually for patient at increased risk or as requested by the patient. Pregnant patients may receive up to 3 tests during pregnancy.  Risks discussed, screening is not recommended    Smoking cessation counseling (up to 8 sessions per year)  Patients must be asymptomatic of tobacco-related conditions to receive as a preventative service.  Non-smoker    Subsequent annual wellness visit  At least 12 months since last AWV  Return in one year     The following information is provided to all patients.  This information is to help you find resources for any of the problems found today that may be affecting your health:                Living healthy guide: www.[facility].[place].[facility]      Understanding Diabetes: www.diabetes.org      Eating healthy: www.cdc.gov/healthyweight      CDC home safety checklist: www.cdc.gov/steadi/patient.html      Agency on Aging: www.goea.[place].[facility]      Alcoholics anonymous (AA): www.aa.org      Physical Activity: www.marycarmen.nih.gov/hx1mcdo      Tobacco use: www.quitwithusla.org

## 2017-05-30 NOTE — PROGRESS NOTES
Subjective:   Patient ID:  Cassandra Campos is a 66 y.o. female who presents for follow up of Pre-op Exam (trigger finger)        67 yo, female, came in for preop clearance of releasing right finger trigger.  PMH HTN, HLP, DM, obesity, ex smoker, DANIEL, not on CPAP.   Had bariatric surgery done in . Uncomplicated and last 30 pounds.  Walks on treadmill 1 mile daily  Lives alone and no limitation of exercise  Patient feels OK, no chest pain, no sob, no palpitation, no dizziness, no syncope, no CNS symptoms.  Today, EKG NSR                          Past Medical History:   Diagnosis Date    Borderline glaucoma     COPD (chronic obstructive pulmonary disease)     pt states she does not have copd    Diabetes mellitus, type 2     Gastritis     Hydradenitis     Hyperlipidemia     Hypertension     Insomnia     Migraines 2000    Nasal septum perforation     Sleep apnea     SVT (supraventricular tachycardia) 2013    Type II or unspecified type diabetes mellitus without mention of complication, not stated as uncontrolled 2013       Past Surgical History:   Procedure Laterality Date    AXILLARY HIDRADENITIS EXCISION      BREAST LUMPECTOMY Bilateral 1998    Benign    CARPAL TUNNEL RELEASE      bilateral    CATARACT EXTRACTION      OU     SECTION      CHOLECYSTECTOMY  2014    CYST REMOVAL  2015    sebaceous cyst removed from face    TONSILLECTOMY, ADENOIDECTOMY      TRIGGER FINGER RELEASE Right 2015    Dr. Pedraza       Social History   Substance Use Topics    Smoking status: Former Smoker     Packs/day: 0.50     Years: 30.00     Types: Cigarettes     Start date: 2012    Smokeless tobacco: Never Used    Alcohol use Yes      Comment: rarely // wine  No alcohol prior to surgery       Family History   Problem Relation Age of Onset    Prostate cancer Brother     Diabetes Maternal Aunt          Review of Systems   Constitution: Negative for decreased  appetite, diaphoresis, fever, weakness, malaise/fatigue and night sweats.   HENT: Negative for headaches and nosebleeds.    Eyes: Negative for blurred vision and double vision.   Cardiovascular: Negative for chest pain, claudication, dyspnea on exertion, irregular heartbeat, leg swelling, near-syncope, orthopnea, palpitations, paroxysmal nocturnal dyspnea and syncope.   Respiratory: Negative for cough, shortness of breath, sleep disturbances due to breathing, snoring, sputum production and wheezing.    Endocrine: Negative for cold intolerance and polyuria.   Hematologic/Lymphatic: Does not bruise/bleed easily.   Skin: Negative for rash.   Musculoskeletal: Negative for back pain, falls, joint pain, joint swelling and neck pain.   Gastrointestinal: Negative for abdominal pain, heartburn, nausea and vomiting.   Genitourinary: Negative for dysuria, frequency and hematuria.   Neurological: Negative for difficulty with concentration, dizziness, focal weakness, light-headedness, numbness and seizures.   Psychiatric/Behavioral: Negative for depression, memory loss and substance abuse. The patient does not have insomnia.    Allergic/Immunologic: Negative for HIV exposure and hives.       Objective:   Physical Exam   Constitutional: She is oriented to person, place, and time. She appears well-nourished.   HENT:   Head: Normocephalic.   Eyes: Pupils are equal, round, and reactive to light.   Neck: Normal carotid pulses and no JVD present. Carotid bruit is not present. No thyromegaly present.   Cardiovascular: Normal rate, regular rhythm, normal heart sounds and normal pulses.   No extrasystoles are present. PMI is not displaced.  Exam reveals no gallop and no S3.    No murmur heard.  Pulmonary/Chest: Breath sounds normal. No stridor. No respiratory distress.   Abdominal: Soft. Bowel sounds are normal. There is no tenderness. There is no rebound.   Musculoskeletal: Normal range of motion.   Neurological: She is alert and  oriented to person, place, and time.   Skin: Skin is intact. No rash noted.   Psychiatric: Her behavior is normal.       Lab Results   Component Value Date    CHOL 170 11/28/2016    CHOL 132 12/22/2015    CHOL 143 06/04/2015     Lab Results   Component Value Date    HDL 30 (L) 11/28/2016    HDL 33 (L) 12/22/2015    HDL 33 (L) 06/04/2015     Lab Results   Component Value Date    LDLCALC 98.4 11/28/2016    LDLCALC 63.8 12/22/2015    LDLCALC 70.2 06/04/2015     Lab Results   Component Value Date    TRIG 208 (H) 11/28/2016    TRIG 176 (H) 12/22/2015    TRIG 199 (H) 06/04/2015     Lab Results   Component Value Date    CHOLHDL 17.6 (L) 11/28/2016    CHOLHDL 25.0 12/22/2015    CHOLHDL 23.1 06/04/2015       Chemistry        Component Value Date/Time     02/14/2017 0550    K 3.2 (L) 02/14/2017 0550     02/14/2017 0550    CO2 25 02/14/2017 0550    BUN 6 (L) 02/14/2017 0550    CREATININE 0.6 02/14/2017 0550     (H) 02/14/2017 0550        Component Value Date/Time    CALCIUM 8.7 02/14/2017 0550    ALKPHOS 111 01/27/2017 1541    AST 24 01/27/2017 1541    ALT 40 01/27/2017 1541    BILITOT 0.2 01/27/2017 1541          Lab Results   Component Value Date    TSH 2.386 11/28/2016     No results found for: INR, PROTIME  Lab Results   Component Value Date    WBC 11.10 02/14/2017    HGB 12.4 02/14/2017    HCT 35.6 (L) 02/14/2017    MCV 72 (L) 02/14/2017     02/14/2017     BMP  Sodium   Date Value Ref Range Status   02/14/2017 139 136 - 145 mmol/L Final     Potassium   Date Value Ref Range Status   02/14/2017 3.2 (L) 3.5 - 5.1 mmol/L Final     Chloride   Date Value Ref Range Status   02/14/2017 103 95 - 110 mmol/L Final     CO2   Date Value Ref Range Status   02/14/2017 25 23 - 29 mmol/L Final     BUN, Bld   Date Value Ref Range Status   02/14/2017 6 (L) 8 - 23 mg/dL Final     Creatinine   Date Value Ref Range Status   02/14/2017 0.6 0.5 - 1.4 mg/dL Final     Calcium   Date Value Ref Range Status   02/14/2017 8.7  8.7 - 10.5 mg/dL Final     Anion Gap   Date Value Ref Range Status   02/14/2017 11 8 - 16 mmol/L Final     eGFR if    Date Value Ref Range Status   02/14/2017 >60 >60 mL/min/1.73 m^2 Final     eGFR if non    Date Value Ref Range Status   02/14/2017 >60 >60 mL/min/1.73 m^2 Final     Comment:     Calculation used to obtain the estimated glomerular filtration  rate (eGFR) is the CKD-EPI equation. Since race is unknown   in our information system, the eGFR values for   -American and Non--American patients are given   for each creatinine result.       CrCl cannot be calculated (Patient's most recent sCr result is older than the maximum 14 days allowed.).     Assessment:      1. Paroxysmal SVT (supraventricular tachycardia)    2. Preop cardiovascular exam    3. Mixed hyperlipidemia    4. Essential hypertension        Plan:   Low periop risk of CV events for low risk procedure.  Good functional and exercise capacity.  No chest pain, active arrhythmia and CHF symptoms.  Ok to proceed the scheduled surgery without further cardiac study.  Continue Metoprolol and Statin periop.  Avoid periop fluid overloaded.  RTC in 1 yr

## 2017-05-30 NOTE — PROGRESS NOTES
"Cassandra Campos presented for a  Medicare AWV and comprehensive Health Risk Assessment today. The following components were reviewed and updated:    · Medical history  · Family History  · Social history  · Allergies and Current Medications  · Health Risk Assessment  · Health Maintenance  · Care Team     ** See Completed Assessments for Annual Wellness Visit within the encounter summary.**       The following assessments were completed:  · Living Situation  · CAGE  · Depression Screening  · Timed Get Up and Go  · Whisper Test  · Cognitive Function Screening  · Nutrition Screening  · ADL Screening  · PAQ Screening    Vitals:    05/30/17 0853   BP: 110/68   BP Location: Left arm   Patient Position: Sitting   BP Method: Manual   Pulse: 76   SpO2: 97%   Weight: 82.9 kg (182 lb 12.2 oz)   Height: 5' 3" (1.6 m)     Body mass index is 32.37 kg/m².  Physical Exam   Constitutional: She appears well-nourished. No distress.   HENT:   Head: Normocephalic and atraumatic.   Decrease hearing   Eyes: Conjunctivae and EOM are normal. Right eye exhibits no discharge. Left eye exhibits no discharge.   Wearing glasses   Neck: Normal range of motion. Neck supple. No tracheal deviation present. No thyromegaly present.   Cardiovascular: Normal rate, regular rhythm and normal heart sounds.    No murmur heard.  Pulses:       Radial pulses are 2+ on the right side, and 2+ on the left side.   Pulmonary/Chest: Effort normal and breath sounds normal. No respiratory distress. She has no wheezes.   Abdominal: Soft. Bowel sounds are normal. She exhibits no distension. There is no tenderness. There is no rebound and no guarding.   Musculoskeletal: Normal range of motion. She exhibits no edema or tenderness.   Right knee discomfort, right middle finger triggering   Neurological: No cranial nerve deficit.   Grasp equal both hands, No tremors, or muscle fasciculations noted. Toes downgoing, Sensation intact to soft touch. Gait: No ataxia.    Skin: Skin " is warm and dry. No rash noted. She is not diaphoretic. No erythema. No pallor.   Psychiatric: She has a normal mood and affect. Her behavior is normal. Judgment and thought content normal.   Nursing note and vitals reviewed.        Diagnoses and health risks identified today and associated recommendations/orders:    1. Encounter for preventive health examination  Completed today    2. Controlled type 2 diabetes mellitus without complication, without long-term current use of insulin  Stable. On Metformin. Continue current treatment plan as previously prescribed with your PCP.    3. Obesity (BMI 30.0-34.9)  S/p Sleeve Gastrectomy 2/13/17 Dr. Watson. Pt reports losing 40lbs since surgery.  Stable. Continue current treatment plan as previously prescribed with your surgeon. Appt 11/14/17.    4. Paroxysmal SVT (supraventricular tachycardia)  Stable. Followed Dr. LILI Davila in the past. Now Dr. Pelaez. Continue current treatment plan as previously prescribed with your cardiologist, Dr. Pelaez. Appt today.    5. Hyperlipidemia associated with type 2 diabetes mellitus  Triglyceride 208 11/28/16. On Pravastatin. Continue follow with PCP.    6. Essential hypertension  Stable. On Metoprolol. Continue current treatment plan as previously prescribed with your PCP.    7. Insomnia, unspecified type  Stable. Takes Amitriptyline. Continue current treatment plan as previously prescribed with your PCP.    8. Transient synovitis of right knee  Follows Dr. Pedraza. Knee MRI scheduled 6/6/17. Takes Relafen.  Continue current treatment plan as previously prescribed with your orthopaedist.    9. Trigger middle finger of right hand  Scheduled for trigger finger release with Dr. Pedraza. Please continue to follow with orthopaedist.    10. Onychomycosis of multiple toenails with type 2 diabetes mellitus  Stable. Continue current treatment plan as previously prescribed with your podiatrist, Dr. Mchugh.    11. Calcified granuloma of lung  Chest xray  3/10/15. Stable. Continue follow with your PCP.    12. Osteopenia, unspecified location  Dexa 11/2015. Takes Calcium. Stable. Continue current treatment plan as previously prescribed with your PCP.    13. Gastroesophageal reflux disease, esophagitis presence not specified  Stable. Takes Omeprazole. Continue current treatment plan as previously prescribed with your PCP.    Provided Cassandra with a 5-10 year written screening schedule and personal prevention plan. Recommendations were developed using the USPSTF age appropriate recommendations. Education, counseling, and referrals were provided as needed. After Visit Summary printed and given to patient which includes a list of additional screenings\tests needed.  Continue to follow with your PCP as scheduled 6/7/17 or sooner if necessary.    Tristan King PA-C

## 2017-06-01 ENCOUNTER — OFFICE VISIT (OUTPATIENT)
Dept: INTERNAL MEDICINE | Facility: CLINIC | Age: 67
End: 2017-06-01
Payer: MEDICARE

## 2017-06-01 VITALS
WEIGHT: 181.75 LBS | TEMPERATURE: 97 F | BODY MASS INDEX: 32.2 KG/M2 | SYSTOLIC BLOOD PRESSURE: 94 MMHG | OXYGEN SATURATION: 97 % | HEART RATE: 83 BPM | DIASTOLIC BLOOD PRESSURE: 66 MMHG

## 2017-06-01 DIAGNOSIS — E11.9 CONTROLLED TYPE 2 DIABETES MELLITUS WITHOUT COMPLICATION, WITHOUT LONG-TERM CURRENT USE OF INSULIN: ICD-10-CM

## 2017-06-01 DIAGNOSIS — L02.411 CUTANEOUS ABSCESS OF RIGHT AXILLA: Primary | ICD-10-CM

## 2017-06-01 DIAGNOSIS — R74.8 ELEVATED LIVER ENZYMES: ICD-10-CM

## 2017-06-01 PROCEDURE — 1125F AMNT PAIN NOTED PAIN PRSNT: CPT | Mod: S$GLB,,, | Performed by: FAMILY MEDICINE

## 2017-06-01 PROCEDURE — 1159F MED LIST DOCD IN RCRD: CPT | Mod: S$GLB,,, | Performed by: FAMILY MEDICINE

## 2017-06-01 PROCEDURE — 3044F HG A1C LEVEL LT 7.0%: CPT | Mod: S$GLB,,, | Performed by: FAMILY MEDICINE

## 2017-06-01 PROCEDURE — 99999 PR PBB SHADOW E&M-EST. PATIENT-LVL III: CPT | Mod: PBBFAC,,, | Performed by: FAMILY MEDICINE

## 2017-06-01 PROCEDURE — 99499 UNLISTED E&M SERVICE: CPT | Mod: S$GLB,,, | Performed by: FAMILY MEDICINE

## 2017-06-01 PROCEDURE — 99213 OFFICE O/P EST LOW 20 MIN: CPT | Mod: S$GLB,,, | Performed by: FAMILY MEDICINE

## 2017-06-01 RX ORDER — MUPIROCIN 20 MG/G
OINTMENT TOPICAL 3 TIMES DAILY
Qty: 1 TUBE | Refills: 0 | Status: SHIPPED | OUTPATIENT
Start: 2017-06-01 | End: 2017-06-11

## 2017-06-01 RX ORDER — SULFAMETHOXAZOLE AND TRIMETHOPRIM 800; 160 MG/1; MG/1
1 TABLET ORAL 2 TIMES DAILY
Qty: 14 TABLET | Refills: 0 | Status: SHIPPED | OUTPATIENT
Start: 2017-06-01 | End: 2017-06-07 | Stop reason: SDUPTHER

## 2017-06-01 RX ORDER — HYDROCODONE BITARTRATE AND ACETAMINOPHEN 5; 325 MG/1; MG/1
1 TABLET ORAL EVERY 6 HOURS PRN
Qty: 8 TABLET | Refills: 0 | Status: ON HOLD | OUTPATIENT
Start: 2017-06-01 | End: 2017-06-16 | Stop reason: HOSPADM

## 2017-06-01 NOTE — PROGRESS NOTES
"Subjective:       Patient ID: Cassandra Campos is a 66 y.o. female.    Chief Complaint: boil under right arm    Noted a boil under her right arm. She has been using "boil Eeze" warm compresses. Had hydradinitis superativa surgery by Dr Monaco to left axilla in distant past.  She is asking for pain med for this as well.  She has trigger finger sx scheduled for 6/16/17. She will be seen here next week for pre-op.      Review of Systems   Constitutional: Negative for chills and fever.   Gastrointestinal: Positive for constipation.   Musculoskeletal: Positive for arthralgias (left elbow).   Skin: Positive for color change.       Objective:      Physical Exam   Constitutional: She is oriented to person, place, and time. She appears well-developed and well-nourished.   HENT:   Head: Normocephalic and atraumatic.   Neurological: She is alert and oriented to person, place, and time.   Skin: Skin is warm and dry.   erythematous swelling to right axilla - mild induration no fluctuance   Psychiatric: She has a normal mood and affect. Her behavior is normal.         Assessment/Plan:     1. Cutaneous abscess of right axilla  mupirocin (BACTROBAN) 2 % ointment    sulfamethoxazole-trimethoprim 800-160mg (BACTRIM DS) 800-160 mg Tab   2. Controlled type 2 diabetes mellitus without complication, without long-term current use of insulin  Hemoglobin A1c    Microalbumin/creatinine urine ratio    Lipid panel    Comprehensive metabolic panel    CBC auto differential   3. Elevated liver enzymes     LFTs elevated - consider rechecking this at her visit next week (per-op eval for trigger finger).  Warm compresses/topical and oral abx for subacute abscess - she will go to urgent care over the weekend if worsening occurs.  Will plan for DM labs early August.    "

## 2017-06-01 NOTE — PATIENT INSTRUCTIONS
Abscess (Antibiotic Treatment Only)  An abscess (sometimes called a boil) happens when bacteria get trapped under the skin and start to grow. Pus forms inside the abscess as the body responds to the bacteria. An abscess can happen with an insect bite, ingrown hair, blocked oil gland, pimple, cyst, or puncture wound.  In the early stages, your wound may be red and tender. For this stage, you may get antibiotics. If the abscess does not get better with antibiotics, it will need to be drained with a small cut.  Home care  These tips will help you care for your abscess at home:  · Soak the wound in hot water or apply hot packs (small towel soaked in hot water) to the area for 20 minutes at a time. Do this 3 to 4 times a day.  · Do not cut, squeeze, or pop the boil yourself.  · Apply antibiotic cream or ointment to the skin 3 to 4 times a day, unless something else was prescribed. Some ointments include an antibiotic plus a pain reliever.  · If your doctor prescribed antibiotics, do not stop taking them until you have finished the medicine or the doctor tells you to stop.  · You may use an over-the-counter pain medicine to control pain, unless another pain medicine was prescribed. If you have chronic liver or kidney disease or ever had a stomach ulcer or gastrointestinal bleeding, talk with your doctor before using these any of these.  Follow-up care  Follow up with your healthcare provider, or as advised. Check your wound each day for the signs of worsening infection listed below.  When to seek medical advice  Get prompt medical attention if any of these occur:  · An increase in redness or swelling  · Red streaks in the skin leading away from the abscess  · An increase in local pain or swelling  · Fever of 100.4ºF (38ºC) or higher, or as directed by your healthcare provider  · Pus or fluid coming from the abscess  · Boil returns after getting better  Date Last Reviewed: 9/1/2016  © 6156-3330 [employer] [address] [place] [address]. All rights reserved. This information is not intended as a substitute for professional medical care. Always follow your healthcare professional's instructions.

## 2017-06-05 ENCOUNTER — TELEPHONE (OUTPATIENT)
Dept: RADIOLOGY | Facility: HOSPITAL | Age: 67
End: 2017-06-05

## 2017-06-05 DIAGNOSIS — M25.561 CHRONIC PAIN OF RIGHT KNEE: Primary | ICD-10-CM

## 2017-06-05 DIAGNOSIS — G89.29 CHRONIC PAIN OF RIGHT KNEE: Primary | ICD-10-CM

## 2017-06-06 ENCOUNTER — HOSPITAL ENCOUNTER (OUTPATIENT)
Dept: RADIOLOGY | Facility: HOSPITAL | Age: 67
Discharge: HOME | End: 2017-06-06
Attending: ORTHOPAEDIC SURGERY
Payer: MEDICARE

## 2017-06-06 DIAGNOSIS — M25.561 CHRONIC PAIN OF RIGHT KNEE: ICD-10-CM

## 2017-06-06 DIAGNOSIS — G89.29 CHRONIC PAIN OF RIGHT KNEE: ICD-10-CM

## 2017-06-06 PROCEDURE — 73721 MRI JNT OF LWR EXTRE W/O DYE: CPT | Mod: TC,PO,RT

## 2017-06-06 PROCEDURE — 73721 MRI JNT OF LWR EXTRE W/O DYE: CPT | Mod: 26,RT,, | Performed by: RADIOLOGY

## 2017-06-07 ENCOUNTER — OFFICE VISIT (OUTPATIENT)
Dept: INTERNAL MEDICINE | Facility: CLINIC | Age: 67
End: 2017-06-07
Payer: MEDICARE

## 2017-06-07 VITALS
BODY MASS INDEX: 32.36 KG/M2 | OXYGEN SATURATION: 96 % | SYSTOLIC BLOOD PRESSURE: 94 MMHG | HEART RATE: 89 BPM | WEIGHT: 182.63 LBS | TEMPERATURE: 96 F | DIASTOLIC BLOOD PRESSURE: 64 MMHG

## 2017-06-07 DIAGNOSIS — Z01.818 PREOP GENERAL PHYSICAL EXAM: ICD-10-CM

## 2017-06-07 DIAGNOSIS — R74.8 ELEVATED LIVER ENZYMES: Primary | ICD-10-CM

## 2017-06-07 DIAGNOSIS — L02.411 CUTANEOUS ABSCESS OF RIGHT AXILLA: ICD-10-CM

## 2017-06-07 PROCEDURE — 99214 OFFICE O/P EST MOD 30 MIN: CPT | Mod: S$GLB,,, | Performed by: FAMILY MEDICINE

## 2017-06-07 PROCEDURE — 84450 TRANSFERASE (AST) (SGOT): CPT

## 2017-06-07 PROCEDURE — 99999 PR PBB SHADOW E&M-EST. PATIENT-LVL III: CPT | Mod: PBBFAC,,, | Performed by: FAMILY MEDICINE

## 2017-06-07 PROCEDURE — 1159F MED LIST DOCD IN RCRD: CPT | Mod: S$GLB,,, | Performed by: FAMILY MEDICINE

## 2017-06-07 PROCEDURE — 1126F AMNT PAIN NOTED NONE PRSNT: CPT | Mod: S$GLB,,, | Performed by: FAMILY MEDICINE

## 2017-06-07 PROCEDURE — 84460 ALANINE AMINO (ALT) (SGPT): CPT

## 2017-06-07 RX ORDER — SULFAMETHOXAZOLE AND TRIMETHOPRIM 800; 160 MG/1; MG/1
1 TABLET ORAL 2 TIMES DAILY
Qty: 10 TABLET | Refills: 0 | Status: SHIPPED | OUTPATIENT
Start: 2017-06-07 | End: 2017-06-14

## 2017-06-07 NOTE — PROGRESS NOTES
Subjective:       Patient ID: Cassandra Campos is a 66 y.o. female.    Chief Complaint: surgery clearance and recheck boil    She is here for preop exam for right trigger finger scheduled for 6/16/17 with Dr. Pedraza.  She is also here for recheck on her right axillary abscess being treated with antibiotics alone.  She states it is much improved at not sore like it was.  It didn't drain, but is improving.  Less red, less swollen.  Labs are reviewed.  CMP and a CBC were performed, as well as chest x-ray and EKG.  All are acceptable except that she has a new elevation of liver enzymes.      Review of Systems   Constitutional: Positive for diaphoresis. Negative for fever.   HENT: Negative for congestion and sore throat.    Respiratory: Negative for cough, chest tightness and shortness of breath.    Cardiovascular: Negative for chest pain.   Gastrointestinal: Positive for constipation. Negative for abdominal pain, diarrhea, nausea and vomiting.   Genitourinary: Negative for dysuria, frequency and vaginal discharge.   Musculoskeletal: Positive for arthralgias (right middle finger).   Skin: Positive for color change.   Neurological: Headaches: occas mild.       Objective:      Physical Exam   Constitutional: She is oriented to person, place, and time. She appears well-developed and well-nourished.   HENT:   Head: Normocephalic and atraumatic.   Right Ear: Tympanic membrane, external ear and ear canal normal.   Left Ear: Tympanic membrane, external ear and ear canal normal.   Nose: Nose normal.   Mouth/Throat: Oropharynx is clear and moist. No oropharyngeal exudate.   Eyes: Conjunctivae and EOM are normal.   Neck: Normal range of motion. Neck supple. No thyromegaly present.   Cardiovascular: Normal rate, regular rhythm and normal heart sounds.  Exam reveals no gallop and no friction rub.    No murmur heard.  Pulmonary/Chest: Effort normal and breath sounds normal. She exhibits no tenderness.   Abdominal: Soft. She exhibits  no distension. There is no tenderness.   Musculoskeletal: She exhibits no edema.   Lymphadenopathy:     She has no cervical adenopathy.   Neurological: She is alert and oriented to person, place, and time.   Skin: Skin is warm and dry.   Psychiatric: She has a normal mood and affect. Her behavior is normal.         Assessment/Plan:     1. Elevated liver enzymes  AST (SGOT)    ALT (SGPT)   2. Cutaneous abscess of right axilla  sulfamethoxazole-trimethoprim 800-160mg (BACTRIM DS) 800-160 mg Tab   3. Preop general physical exam     repeat AST, ALT today - if level decreasing or WNL, no contraindications to surgery planned for 6/16/17. If increasing will require further eval, and assessment re any increased risk for planned surgery.  I am extending her abx course another 5 days - the abscess is resolving without I&D. Minimal induration remaining.  BP 90/61 noted - Recommend reducing metoprolol to 25 mg metoprolol daily, though she is asymptomatic with BP 90s/60s.    Addendum: Rising AST/ALT which were 24/44 months ago, then 64/132 two weeks ago with repeat at increasing level of 80/219 last week.  Repeat performed 6/13/17 shows stabilization of AST at 79 and decreased ALT of 170.  Acute hepatitis panels were negative.  No contraindication to planned surgery tomorrow.

## 2017-06-08 DIAGNOSIS — R74.02 NONSPECIFIC ELEVATION OF LEVELS OF TRANSAMINASE AND LACTIC ACID DEHYDROGENASE (LDH): ICD-10-CM

## 2017-06-08 DIAGNOSIS — R74.01 NONSPECIFIC ELEVATION OF LEVELS OF TRANSAMINASE AND LACTIC ACID DEHYDROGENASE (LDH): ICD-10-CM

## 2017-06-08 DIAGNOSIS — R74.8 ELEVATED LIVER ENZYMES: Primary | ICD-10-CM

## 2017-06-08 LAB
ALT SERPL W/O P-5'-P-CCNC: 219 U/L
AST SERPL-CCNC: 80 U/L

## 2017-06-09 ENCOUNTER — PATIENT MESSAGE (OUTPATIENT)
Dept: SURGERY | Facility: HOSPITAL | Age: 67
End: 2017-06-09

## 2017-06-09 NOTE — PRE-PROCEDURE INSTRUCTIONS
Pre op instructions reviewed with patient per phone:    To confirm, Your surgeon has instructed you:  Surgery is scheduled 6/16/17 at 0700.      Please report to [facility] 1[facility] floor main lobby by 0530 Pre admit nurse will call day prior to surgery with final arrival time.      INSTRUCTIONS IMPORTANT!!!  ¨ Do not eat, drink, or smoke after 12 midnight-including water. OK to brush teeth, no gum, candy or mints!    ¨ Take only these medicines with a small swallow of water-morning of surgery.  None  ____  Do not wear makeup, including mascara.  ____  No powder, lotions or creams to surgical area.  ____  Please remove all jewelry, including piercings and leave at home.  ____  No money or valuables needed. Please leave at home.  ____  Please bring identification and insurance information to hospital.  ____  If going home the same day, arrange for a ride home. You will not be able to   drive if Anesthesia was used.  ____  Children, under 12 years old, must remain in the waiting room with an adult.  They are not allowed in patient areas.  ____  Wear loose fitting clothing. Allow for dressings, bandages.  ____  Stop Aspirin, Ibuprofen, Motrin and Aleve at least 5-7 days before surgery, unless otherwise instructed by your doctor, or the nurse.   You MAY use Tylenol/acetaminophen until day of surgery.  ____  If you take diabetic medication, do not take am of surgery unless instructed by   Doctor.  ____ Stop taking any Fish Oil supplement or any Vitamins that contain Vitamin E at least 5 days prior to surgery.          Bathing Instructions-- The night before surgery and the morning prior to coming to the hospital:   -Do not shave the surgical area.   -Shower and wash your hair and body as usual with anti-bacterial  soap and shampoo.   -Rinse your hair and body completely.   -Use one packet of hibiclens to wash the surgical site (using your hand) gently for 5 minutes.  Do not scrub you skin too  hard.   -Do not use hibiclens on your head, face, or genitals.   -Do not wash with anti-bacterial soap after you use the hibiclens.   -Rinse your body thoroughly.   -Dry with clean, soft towel.  Do not use lotion, cream, deodorant, or powders on   the surgical site.    Use antibacterial soap in place of hibiclens if your surgery is on the head, face or genitals.         Surgical Site Infection    Prevention of surgical site infections:     -Keep incisions clean and dry.   -Do not soak/submerge incisions in water until completely healed.   -Do not apply lotions, powders, creams, or deodorants to site.   -Always make sure hands are cleaned with antibacterial soap/ alcohol-based   prior to touching the surgical site.  (This includes doctors, nurses, staff, and yourself.)    Signs and symptoms:   -Redness and pain around the area where you had surgery   -Drainage of cloudy fluid from your surgical wound   -Fever over 100.4  I have read or had read and explained to me, and understand the above information.

## 2017-06-12 ENCOUNTER — TELEPHONE (OUTPATIENT)
Dept: INTERNAL MEDICINE | Facility: CLINIC | Age: 67
End: 2017-06-12

## 2017-06-12 ENCOUNTER — ANESTHESIA EVENT (OUTPATIENT)
Dept: SURGERY | Facility: HOSPITAL | Age: 67
End: 2017-06-12
Payer: MEDICARE

## 2017-06-12 ENCOUNTER — LAB VISIT (OUTPATIENT)
Dept: LAB | Facility: HOSPITAL | Age: 67
End: 2017-06-12
Attending: FAMILY MEDICINE
Payer: MEDICARE

## 2017-06-12 DIAGNOSIS — R74.01 NONSPECIFIC ELEVATION OF LEVELS OF TRANSAMINASE AND LACTIC ACID DEHYDROGENASE (LDH): ICD-10-CM

## 2017-06-12 DIAGNOSIS — R74.02 NONSPECIFIC ELEVATION OF LEVELS OF TRANSAMINASE AND LACTIC ACID DEHYDROGENASE (LDH): ICD-10-CM

## 2017-06-12 DIAGNOSIS — R74.8 ELEVATED LIVER ENZYMES: ICD-10-CM

## 2017-06-12 LAB — LDH SERPL L TO P-CCNC: 151 U/L

## 2017-06-12 PROCEDURE — 83615 LACTATE (LD) (LDH) ENZYME: CPT

## 2017-06-12 PROCEDURE — 80074 ACUTE HEPATITIS PANEL: CPT

## 2017-06-12 PROCEDURE — 36415 COLL VENOUS BLD VENIPUNCTURE: CPT | Mod: PO

## 2017-06-12 NOTE — TELEPHONE ENCOUNTER
----- Message from Denia Maldonado MD sent at 6/10/2017 11:57 PM CDT -----  Please see her lab results from my note 6/8/17.  It does not appear that the lab was contacted on Friday.  Please see if those lab tests, the acute hepatitis panel, hepatitis C antibody and LDH, can be run on her blood work from Thursday.  At this point it seems doubtful, so please be sure to get the patient in ASAP to have labs drawn.  Have her do it at one of the labs instead of at our office so that it will be done more quickly. (LDH not important if there is not enough blood.)  Her surgery is scheduled for 6/16 - Friday.

## 2017-06-12 NOTE — TELEPHONE ENCOUNTER
The patient has been notified, she said she will try to get it done today, if not tomorrow.  Stressed to the patient that we need to get it done ASAP

## 2017-06-13 ENCOUNTER — PATIENT MESSAGE (OUTPATIENT)
Dept: INTERNAL MEDICINE | Facility: CLINIC | Age: 67
End: 2017-06-13

## 2017-06-13 ENCOUNTER — TELEPHONE (OUTPATIENT)
Dept: INTERNAL MEDICINE | Facility: CLINIC | Age: 67
End: 2017-06-13

## 2017-06-13 ENCOUNTER — LAB VISIT (OUTPATIENT)
Dept: LAB | Facility: HOSPITAL | Age: 67
End: 2017-06-13
Attending: FAMILY MEDICINE
Payer: MEDICARE

## 2017-06-13 DIAGNOSIS — R74.8 ABNORMAL LIVER ENZYMES: ICD-10-CM

## 2017-06-13 DIAGNOSIS — Z98.890 POST-OPERATIVE STATE: Primary | ICD-10-CM

## 2017-06-13 DIAGNOSIS — R74.8 ABNORMAL LIVER ENZYMES: Primary | ICD-10-CM

## 2017-06-13 LAB
HAV IGM SERPL QL IA: NEGATIVE
HBV CORE IGM SERPL QL IA: NEGATIVE
HBV SURFACE AG SERPL QL IA: NEGATIVE
HCV AB SERPL QL IA: NEGATIVE
HCV AB SERPL QL IA: NEGATIVE

## 2017-06-13 PROCEDURE — 84460 ALANINE AMINO (ALT) (SGPT): CPT

## 2017-06-13 PROCEDURE — 36415 COLL VENOUS BLD VENIPUNCTURE: CPT | Mod: PO

## 2017-06-13 PROCEDURE — 84450 TRANSFERASE (AST) (SGOT): CPT

## 2017-06-14 LAB
ALT SERPL W/O P-5'-P-CCNC: 170 U/L
AST SERPL-CCNC: 79 U/L

## 2017-06-15 ENCOUNTER — PATIENT MESSAGE (OUTPATIENT)
Dept: INTERNAL MEDICINE | Facility: CLINIC | Age: 67
End: 2017-06-15

## 2017-06-15 ENCOUNTER — TELEPHONE (OUTPATIENT)
Dept: INTERNAL MEDICINE | Facility: CLINIC | Age: 67
End: 2017-06-15

## 2017-06-15 NOTE — ANESTHESIA PREPROCEDURE EVALUATION
06/15/2017  Cassandra Campos is a 66 y.o., female.  With trigger finger    Anesthesia Evaluation    I have reviewed the Patient Summary Reports.    I have reviewed the Nursing Notes.      Review of Systems  Hematology/Oncology:     Oncology Normal     Cardiovascular:   Hypertension    Hepatic/GI:   GERD Liver Disease, Improving elevated liver enzymes   Musculoskeletal:  Musculoskeletal Normal    Neurological:   Headaches    Endocrine:   Diabetes    Dermatological:  Skin Normal        Physical Exam  General:  Well nourished    Airway/Jaw/Neck:  Airway Findings: Mouth Opening: Normal General Airway Assessment: Adult  Mallampati: II      Dental:  Dental Findings: Edentulous        Mental Status:  Mental Status Findings:  Cooperative         Anesthesia Plan  Type of Anesthesia, risks & benefits discussed:  Anesthesia Type:  general  Patient's Preference:   Intra-op Monitoring Plan:   Intra-op Monitoring Plan Comments:   Post Op Pain Control Plan:   Post Op Pain Control Plan Comments:   Induction:   IV  Beta Blocker:  Patient is on a Beta-Blocker and has received one dose within the past 24 hours (No further documentation required).       Informed Consent:    ASA Score: 3     Day of Surgery Review of History & Physical: I have interviewed and examined the patient. I have reviewed the patient's H&P dated: 6/7/17. There are no significant changes.          Ready For Surgery From Anesthesia Perspective.

## 2017-06-16 ENCOUNTER — SURGERY (OUTPATIENT)
Age: 67
End: 2017-06-16

## 2017-06-16 ENCOUNTER — ANESTHESIA (OUTPATIENT)
Dept: SURGERY | Facility: HOSPITAL | Age: 67
End: 2017-06-16
Payer: MEDICARE

## 2017-06-16 ENCOUNTER — HOSPITAL ENCOUNTER (OUTPATIENT)
Facility: HOSPITAL | Age: 67
Discharge: HOME | End: 2017-06-16
Attending: ORTHOPAEDIC SURGERY | Admitting: ORTHOPAEDIC SURGERY
Payer: MEDICARE

## 2017-06-16 VITALS
TEMPERATURE: 99 F | DIASTOLIC BLOOD PRESSURE: 57 MMHG | BODY MASS INDEX: 32.07 KG/M2 | HEIGHT: 63 IN | RESPIRATION RATE: 20 BRPM | SYSTOLIC BLOOD PRESSURE: 97 MMHG | OXYGEN SATURATION: 92 % | WEIGHT: 181 LBS | HEART RATE: 91 BPM

## 2017-06-16 DIAGNOSIS — M65.331 TRIGGER MIDDLE FINGER OF RIGHT HAND: Primary | ICD-10-CM

## 2017-06-16 DIAGNOSIS — M65.30 TRIGGER FINGER: ICD-10-CM

## 2017-06-16 DIAGNOSIS — M94.261 CHONDROMALACIA OF RIGHT KNEE: ICD-10-CM

## 2017-06-16 LAB
POCT GLUCOSE: 107 MG/DL (ref 70–110)
POCT GLUCOSE: 109 MG/DL (ref 70–110)

## 2017-06-16 PROCEDURE — 36000711: Performed by: ORTHOPAEDIC SURGERY

## 2017-06-16 PROCEDURE — 25000003 PHARM REV CODE 250: Performed by: CLINIC/CENTER

## 2017-06-16 PROCEDURE — 25000003 PHARM REV CODE 250: Performed by: ORTHOPAEDIC SURGERY

## 2017-06-16 PROCEDURE — 27201423 OPTIME MED/SURG SUP & DEVICES STERILE SUPPLY: Performed by: ORTHOPAEDIC SURGERY

## 2017-06-16 PROCEDURE — 63600175 PHARM REV CODE 636 W HCPCS: Performed by: CLINIC/CENTER

## 2017-06-16 PROCEDURE — 37000009 HC ANESTHESIA EA ADD 15 MINS: Performed by: ORTHOPAEDIC SURGERY

## 2017-06-16 PROCEDURE — 25000003 PHARM REV CODE 250: Performed by: ANESTHESIOLOGY

## 2017-06-16 PROCEDURE — 63600175 PHARM REV CODE 636 W HCPCS: Performed by: PHYSICIAN ASSISTANT

## 2017-06-16 PROCEDURE — 71000015 HC POSTOP RECOV 1ST HR: Performed by: ORTHOPAEDIC SURGERY

## 2017-06-16 PROCEDURE — 37000008 HC ANESTHESIA 1ST 15 MINUTES: Performed by: ORTHOPAEDIC SURGERY

## 2017-06-16 PROCEDURE — 63600175 PHARM REV CODE 636 W HCPCS: Performed by: ANESTHESIOLOGY

## 2017-06-16 PROCEDURE — 36000710: Performed by: ORTHOPAEDIC SURGERY

## 2017-06-16 PROCEDURE — 71000033 HC RECOVERY, INTIAL HOUR: Performed by: ORTHOPAEDIC SURGERY

## 2017-06-16 RX ORDER — BUPIVACAINE HYDROCHLORIDE 2.5 MG/ML
INJECTION, SOLUTION EPIDURAL; INFILTRATION; INTRACAUDAL
Status: DISCONTINUED | OUTPATIENT
Start: 2017-06-16 | End: 2017-06-16 | Stop reason: HOSPADM

## 2017-06-16 RX ORDER — LIDOCAINE HYDROCHLORIDE 10 MG/ML
INJECTION INFILTRATION; PERINEURAL
Status: DISCONTINUED | OUTPATIENT
Start: 2017-06-16 | End: 2017-06-16

## 2017-06-16 RX ORDER — ONDANSETRON 2 MG/ML
INJECTION INTRAMUSCULAR; INTRAVENOUS
Status: DISCONTINUED | OUTPATIENT
Start: 2017-06-16 | End: 2017-06-16

## 2017-06-16 RX ORDER — HYDROMORPHONE HYDROCHLORIDE 2 MG/ML
0.2 INJECTION, SOLUTION INTRAMUSCULAR; INTRAVENOUS; SUBCUTANEOUS EVERY 5 MIN PRN
Status: DISCONTINUED | OUTPATIENT
Start: 2017-06-16 | End: 2017-06-16 | Stop reason: HOSPADM

## 2017-06-16 RX ORDER — GLYCOPYRROLATE 0.2 MG/ML
INJECTION INTRAMUSCULAR; INTRAVENOUS
Status: DISCONTINUED | OUTPATIENT
Start: 2017-06-16 | End: 2017-06-16

## 2017-06-16 RX ORDER — HYDROCODONE BITARTRATE AND ACETAMINOPHEN 5; 325 MG/1; MG/1
1 TABLET ORAL EVERY 4 HOURS PRN
Status: DISCONTINUED | OUTPATIENT
Start: 2017-06-16 | End: 2017-06-16 | Stop reason: HOSPADM

## 2017-06-16 RX ORDER — FENTANYL CITRATE 50 UG/ML
INJECTION, SOLUTION INTRAMUSCULAR; INTRAVENOUS
Status: DISCONTINUED | OUTPATIENT
Start: 2017-06-16 | End: 2017-06-16

## 2017-06-16 RX ORDER — SODIUM CHLORIDE 9 MG/ML
3 INJECTION, SOLUTION INTRAMUSCULAR; INTRAVENOUS; SUBCUTANEOUS EVERY 8 HOURS
Status: DISCONTINUED | OUTPATIENT
Start: 2017-06-16 | End: 2017-06-16 | Stop reason: HOSPADM

## 2017-06-16 RX ORDER — SUCCINYLCHOLINE CHLORIDE 20 MG/ML
INJECTION INTRAMUSCULAR; INTRAVENOUS
Status: DISCONTINUED | OUTPATIENT
Start: 2017-06-16 | End: 2017-06-16

## 2017-06-16 RX ORDER — ACETAMINOPHEN 10 MG/ML
1000 INJECTION, SOLUTION INTRAVENOUS ONCE
Status: DISCONTINUED | OUTPATIENT
Start: 2017-06-16 | End: 2017-06-16 | Stop reason: HOSPADM

## 2017-06-16 RX ORDER — MEPERIDINE HYDROCHLORIDE 50 MG/ML
12.5 INJECTION INTRAMUSCULAR; INTRAVENOUS; SUBCUTANEOUS ONCE AS NEEDED
Status: DISCONTINUED | OUTPATIENT
Start: 2017-06-16 | End: 2017-06-16 | Stop reason: HOSPADM

## 2017-06-16 RX ORDER — MIDAZOLAM HYDROCHLORIDE 1 MG/ML
INJECTION, SOLUTION INTRAMUSCULAR; INTRAVENOUS
Status: DISCONTINUED | OUTPATIENT
Start: 2017-06-16 | End: 2017-06-16

## 2017-06-16 RX ORDER — PHENYLEPHRINE HYDROCHLORIDE 10 MG/ML
INJECTION INTRAVENOUS
Status: DISCONTINUED | OUTPATIENT
Start: 2017-06-16 | End: 2017-06-16

## 2017-06-16 RX ORDER — LIDOCAINE HYDROCHLORIDE 10 MG/ML
INJECTION, SOLUTION EPIDURAL; INFILTRATION; INTRACAUDAL; PERINEURAL
Status: DISCONTINUED | OUTPATIENT
Start: 2017-06-16 | End: 2017-06-16 | Stop reason: HOSPADM

## 2017-06-16 RX ORDER — MUPIROCIN 20 MG/G
1 OINTMENT TOPICAL
Status: DISCONTINUED | OUTPATIENT
Start: 2017-06-16 | End: 2017-06-16 | Stop reason: HOSPADM

## 2017-06-16 RX ORDER — CEFAZOLIN SODIUM 2 G/50ML
2 SOLUTION INTRAVENOUS ONCE
Status: COMPLETED | OUTPATIENT
Start: 2017-06-16 | End: 2017-06-16

## 2017-06-16 RX ORDER — PROPOFOL 10 MG/ML
VIAL (ML) INTRAVENOUS
Status: DISCONTINUED | OUTPATIENT
Start: 2017-06-16 | End: 2017-06-16

## 2017-06-16 RX ORDER — SODIUM CHLORIDE 9 MG/ML
3 INJECTION, SOLUTION INTRAMUSCULAR; INTRAVENOUS; SUBCUTANEOUS
Status: DISCONTINUED | OUTPATIENT
Start: 2017-06-16 | End: 2017-06-16 | Stop reason: HOSPADM

## 2017-06-16 RX ORDER — OXYCODONE AND ACETAMINOPHEN 10; 325 MG/1; MG/1
1 TABLET ORAL EVERY 4 HOURS PRN
Qty: 90 TABLET | Refills: 0 | Status: SHIPPED | OUTPATIENT
Start: 2017-06-16 | End: 2017-07-26 | Stop reason: ALTCHOICE

## 2017-06-16 RX ORDER — SODIUM CHLORIDE, SODIUM LACTATE, POTASSIUM CHLORIDE, CALCIUM CHLORIDE 600; 310; 30; 20 MG/100ML; MG/100ML; MG/100ML; MG/100ML
INJECTION, SOLUTION INTRAVENOUS CONTINUOUS
Status: DISCONTINUED | OUTPATIENT
Start: 2017-06-16 | End: 2017-06-16 | Stop reason: HOSPADM

## 2017-06-16 RX ORDER — ROCURONIUM BROMIDE 10 MG/ML
INJECTION, SOLUTION INTRAVENOUS
Status: DISCONTINUED | OUTPATIENT
Start: 2017-06-16 | End: 2017-06-16

## 2017-06-16 RX ORDER — LIDOCAINE HYDROCHLORIDE 10 MG/ML
1 INJECTION, SOLUTION EPIDURAL; INFILTRATION; INTRACAUDAL; PERINEURAL ONCE
Status: DISCONTINUED | OUTPATIENT
Start: 2017-06-16 | End: 2017-06-16 | Stop reason: HOSPADM

## 2017-06-16 RX ORDER — CEFAZOLIN SODIUM 2 G/50ML
2 SOLUTION INTRAVENOUS
Status: DISCONTINUED | OUTPATIENT
Start: 2017-06-16 | End: 2017-06-16 | Stop reason: HOSPADM

## 2017-06-16 RX ADMIN — LIDOCAINE HYDROCHLORIDE 10 ML: 10 INJECTION, SOLUTION EPIDURAL; INFILTRATION; INTRACAUDAL; PERINEURAL at 08:06

## 2017-06-16 RX ADMIN — ROCURONIUM BROMIDE 5 MG: 10 INJECTION, SOLUTION INTRAVENOUS at 07:06

## 2017-06-16 RX ADMIN — FENTANYL CITRATE 100 MCG: 50 INJECTION, SOLUTION INTRAMUSCULAR; INTRAVENOUS at 07:06

## 2017-06-16 RX ADMIN — SUCCINYLCHOLINE CHLORIDE 100 MG: 20 INJECTION, SOLUTION INTRAMUSCULAR; INTRAVENOUS at 07:06

## 2017-06-16 RX ADMIN — LIDOCAINE HYDROCHLORIDE 50 MG: 10 INJECTION, SOLUTION INFILTRATION; PERINEURAL at 07:06

## 2017-06-16 RX ADMIN — GLYCOPYRROLATE 0.2 MG: 0.2 INJECTION, SOLUTION INTRAMUSCULAR; INTRAVENOUS at 08:06

## 2017-06-16 RX ADMIN — HYDROMORPHONE HYDROCHLORIDE 0.2 MG: 2 INJECTION, SOLUTION INTRAMUSCULAR; INTRAVENOUS; SUBCUTANEOUS at 09:06

## 2017-06-16 RX ADMIN — PHENYLEPHRINE HYDROCHLORIDE 100 MCG: 10 INJECTION INTRAVENOUS at 08:06

## 2017-06-16 RX ADMIN — ONDANSETRON 4 MG: 2 INJECTION, SOLUTION INTRAMUSCULAR; INTRAVENOUS at 08:06

## 2017-06-16 RX ADMIN — PROPOFOL 120 MG: 10 INJECTION, EMULSION INTRAVENOUS at 07:06

## 2017-06-16 RX ADMIN — HYDROCODONE BITARTRATE AND ACETAMINOPHEN 1 TABLET: 5; 325 TABLET ORAL at 09:06

## 2017-06-16 RX ADMIN — BUPIVACAINE HYDROCHLORIDE 30 ML: 2.5 INJECTION, SOLUTION EPIDURAL; INFILTRATION; INTRACAUDAL; PERINEURAL at 08:06

## 2017-06-16 RX ADMIN — GLYCOPYRROLATE 0.2 MG: 0.2 INJECTION, SOLUTION INTRAMUSCULAR; INTRAVENOUS at 07:06

## 2017-06-16 RX ADMIN — PHENYLEPHRINE HYDROCHLORIDE 200 MCG: 10 INJECTION INTRAVENOUS at 08:06

## 2017-06-16 RX ADMIN — SODIUM CHLORIDE, SODIUM LACTATE, POTASSIUM CHLORIDE, AND CALCIUM CHLORIDE: 600; 310; 30; 20 INJECTION, SOLUTION INTRAVENOUS at 06:06

## 2017-06-16 RX ADMIN — CEFAZOLIN SODIUM 2 G: 2 SOLUTION INTRAVENOUS at 06:06

## 2017-06-16 RX ADMIN — MIDAZOLAM HYDROCHLORIDE 2 MG: 1 INJECTION, SOLUTION INTRAMUSCULAR; INTRAVENOUS at 06:06

## 2017-06-16 NOTE — H&P (VIEW-ONLY)
CC:This is a 66-year-old female that complains of right Middle finger triggering and left elbow pain.  She also complains of right knee pain.    HPI:The patient has had  Persistent right middle finger triggering for over 6 months as well as left elbow tenderness and pain.    PMH:    Past Medical History:   Diagnosis Date    Borderline glaucoma     COPD (chronic obstructive pulmonary disease)     pt states she does not have copd    Diabetes mellitus, type 2     Gastritis     Hydradenitis     Hyperlipidemia     Hypertension     Insomnia     Migraines 2000    Nasal septum perforation     Sleep apnea     SVT (supraventricular tachycardia) 2013    Type II or unspecified type diabetes mellitus without mention of complication, not stated as uncontrolled 2013       PSH:    Past Surgical History:   Procedure Laterality Date    AXILLARY HIDRADENITIS EXCISION      BREAST LUMPECTOMY Bilateral 1998    Benign    CARPAL TUNNEL RELEASE      bilateral    CATARACT EXTRACTION      OU     SECTION      CHOLECYSTECTOMY  2014    CYST REMOVAL  2015    sebaceous cyst removed from face    TONSILLECTOMY, ADENOIDECTOMY      TRIGGER FINGER RELEASE Right 2015    Dr. Pedraza       Family Hx:    Family History   Problem Relation Age of Onset    Prostate cancer Brother     Diabetes Maternal Aunt        Allergy:  Review of patient's allergies indicates:  No Known Allergies    Medication:    Current Outpatient Prescriptions:     ACCU-CHEK FASTCLIX Misc, USE ONE TIME DAILY, Disp: 100 each, Rfl: 3    ACCU-CHEK SMARTVIEW TEST STRIP Strp, USE TO TEST ONCE DAILY, Disp: 100 strip, Rfl: 3    ALBUTEROL SULFATE (VENTOLIN HFA INHL), Inhale 1 puff into the lungs every 4 (four) hours as needed., Disp: , Rfl:     amitriptyline (ELAVIL) 100 MG tablet, TAKE 1 TABLET NIGHTLY, Disp: 90 tablet, Rfl: 3    b complex vitamins tablet, Take 1 tablet by mouth once daily., Disp: , Rfl:     blood-glucose  meter kit, Use as instructed, Disp: 1 each, Rfl: 0    calcium citrate (CALCITRATE) 200 mg (950 mg) tablet, Take 1 tablet by mouth once daily. Ca 630 mg/Vit D 500IU - Pt takes 1 tab three times daily, Disp: , Rfl:     cyanocobalamin, vitamin B-12, (VITAMIN B-12) 50 mcg tablet, Take 50 mcg by mouth once daily., Disp: , Rfl:     eszopiclone 3 mg Tab, TAKE 1 TABLET EVERY NIGHT AS NEEDED, Disp: 90 tablet, Rfl: 0    ferrous sulfate 325 (65 FE) MG EC tablet, Take 18 mg by mouth once daily. , Disp: , Rfl:     metformin (GLUCOPHAGE-XR) 500 MG 24 hr tablet, TAKE 3 TABLETS ONE TIME DAILY, Disp: 270 tablet, Rfl: 0    metoprolol succinate (TOPROL-XL) 50 MG 24 hr tablet, Take 1 tablet (50 mg total) by mouth once daily., Disp: 90 tablet, Rfl: 1    multivitamin (ONE DAILY MULTIVITAMIN) per tablet, Take 1 tablet by mouth 2 (two) times daily. , Disp: , Rfl:     nabumetone (RELAFEN) 500 MG tablet, Take 500 mg by mouth once daily., Disp: , Rfl:     omeprazole (PRILOSEC) 20 MG capsule, Take 1 capsule (20 mg total) by mouth once daily. (Patient taking differently: Take 20 mg by mouth every evening. ), Disp: 90 capsule, Rfl: 1    pravastatin (PRAVACHOL) 40 MG tablet, TAKE 1 TABLET ONE TIME DAILY (Patient taking differently: Take 40 mg by mouth every evening. TAKE 1 TABLET ONE TIME DAILY), Disp: 30 tablet, Rfl: 0    Social History:    Social History     Social History    Marital status:      Spouse name: N/A    Number of children: 1    Years of education: N/A     Occupational History     aid      Social History Main Topics    Smoking status: Former Smoker     Packs/day: 0.50     Years: 30.00     Types: Cigarettes     Start date: 1/1/2012    Smokeless tobacco: Never Used    Alcohol use Yes      Comment: rarely // wine  No alcohol prior to surgery    Drug use: No    Sexual activity: Not Currently     Other Topics Concern    Not on file     Social History Narrative    Single, part-time teacher.  "Masters degree biology.        Vitals:     BP 98/60 (BP Location: Right arm, Patient Position: Sitting, BP Method: Automatic)   Pulse 74   Ht 5' 3" (1.6 m)   Wt 82.2 kg (181 lb 3.5 oz)   BMI 32.10 kg/m²      ROS:  GENERAL: No fever, chills, fatigability or weight loss.  SKIN: No rashes, itching or changes in color or texture of skin.  HEAD: No headaches or recent head trauma.  EYES: Visual acuity fine. No photophobia, ocular pain or diplopia.  EARS: Denies ear pain, discharge or vertigo.  NOSE: No loss of smell, no epistaxis or postnasal drip.  MOUTH & THROAT: No hoarseness or change in voice. No excessive gum bleeding.  NODES: Denies swollen glands.  CHEST: Denies BOGGS, cyanosis, wheezing, cough and sputum production.  CARDIOVASCULAR: Denies chest pain, PND, orthopnea or reduced exercise tolerance.  ABDOMEN: Appetite fine. No weight loss. Denies diarrhea, abdominal pain, hematemesis or blood in stool.  URINARY: No flank pain, dysuria or hematuria.  PERIPHERAL VASCULAR: No claudication or cyanosis.  NEUROLOGIC: No history of seizures, paralysis, alteration of gait or coordination.  MUSCULOSKELETAL: See HPI    PE:  APPEARANCE: Well nourished, well developed, in no acute distress.   HEAD: Normocephalic, atraumatic.  EYES: PERRL. EOMI.   EARS: TM's intact. Light reflex normal. No retraction or perforation.   NOSE: Mucosa pink. Airway clear.  MOUTH & THROAT: No tonsillar enlargement. No pharyngeal erythema or exudate. No stridor.  NECK: Supple.   NODES: No cervical, axillary or inguinal lymph node enlargement.  CHEST: Lungs clear to auscultation.  CARDIOVASCULAR: Normal S1, S2. No rubs, murmurs or gallops.  ABDOMEN: Bowel sounds normal. Not distended. Soft. No tenderness or masses.  NEUROLOGIC: Cranial Nerves: II-XII grossly intact, also see MUSCULOSKELETAL  MUSCULOSKELETAL:               Right middle finger-  2 plus radial  artery and ulnar artery pulses, light touch intact Right upper extremity.  All digits are " warm. No erythema, no warmth, no drainage, No swelling, no significant tenderness. Triggering and locking of the right middle finger    Left elbow-  2 plus radial  artery and ulnar artery pulses, light touch intact Right upper extremity.  All digits are warm. No erythema, no warmth, no drainage, No swelling,  significant tenderness Over the lateral epicondyle.Tenderness and pain with resisted wrist extension at the lateral epicondyle.       Right  Knee Exam-abnormal    Gait-abnormal  Muscle Appearance:abnormal  Grooming:normal  Spine Alignment-normal  Muscle Atrophy-Positive  Deformities-Negative  Tenderness-Positive  Paresthesias-Negative  Range of Motion         Ext-normal, 0 degrees         Flex-abnormal  Muscle Strength-abnormal  Sensation-normal  Reflexes-normal  Crepitus-Positive                                Swelling-Negative  Effusion- Positive                                Edema-Negative  Lachman-Negative                                Erythema-Negative  [facility]'s-Positive                              Apley Grind-Positive  Patellar Comp-Positive                         Alignment-normal/symmetric  Patellar Apprehension-Negative              Synovial fullness-Positive  Passive Patellar Tilt-normal  Patellar Tracking-normal   Patellar Glide-normal  Q-Angle at 90 degrees-normal  Patellar Grind-abnormal  [id number]  Fatigue-Negative                                     HS Tightness-Negative  Tests on Exam, No ligamentous laxity  Neurovascular Status-normal+2 DP and PT artery pulses  Skin-normal       Assessment:           Diagnosis:              1.right  Middle trigger finger               2. Left lateral epicondylitis               3. Right knee chondromalacia and synovitis    Diagnostic Studies  MRI-No  X-Ray-No  EMG/NCV-No  Arthrogram-No  Bone Scan-No  CT Scan-No  Doppler-No  ESR-No  CRP-No  CBC with Diff-No   Rheumatoid/Arthritis Panel-No      Plan:                                                 1.  PT-no                                                 2.OT-Yes, left elbow                                          3.NSAID-no                                        4. Narcotics-no                                     5. Wound care-No                                 6. Rest-yes                                           7. Surgery-yes , Right middle trigger finger release and right knee arthroscope.                                       8. VAZQUEZ Hose-no                                    9. Anticoagulation therapy-no               10. Elevation-no                                     11. Crutches-no                                    12. Walker-no             13. Cane no                        14. Referral-no                                     15.Injection-no                            16. Splint Left elbow            17. RICE-none            18. Follow up- 3 weeks

## 2017-06-16 NOTE — PLAN OF CARE
Patient prepared for surgery. No family at bedside. Dentures removed and placed in cup in belongings bag. All belongings locked in locker #6. Obtained number for ride home and placed on PACU jot sheet.

## 2017-06-16 NOTE — OP NOTE
OPERATIVE DICTATION:    DATE OF SERVICE:June 16, 2017      Preoperative Diagnosis:  Right middle trigger finger, right knee chondromalacia    Postoperative Diagnosis:  Right middle finger trigger finger, tenosynovitis of the flexor tendon.  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     Procedure: Right knee patella abrasion arthroplasty and microfracture, lateral release of the patella, partial endoscopic lateral meniscectomy, partial synovectomy, right middle finger trigger finger release, right middle finger flexor tenosynovectomy    Indication for surgery: Right trigger finger and right knee chondral malacia    Anesthesia: Gen. anesthesia     Complications:  None    Surgeon: Jayro Pedraza M.D.     Specimen: None    Assistant:FACUNDO Mejia.  His assistance was critical and necessary with positioning of the extremity throughout the surgical procedure.   The physician assistant allowed me to access areas of the knee and middle finger that could not be possible without the assistance of a trained orthopedic physician assistant.  His assistance was critical to allowing me to provide the highest level of care to the patient.      Operative procedure:    The patient was brought to the operating room and placed under general anesthesia with intubation, after the consent was in.  The timeout was performed and the correct extremity identified.  The right knee was prepped with alcohol chlorhexidine and sterilely draped.  The right upper extremity was Prepped with alcohol chlorhexidine and sterilely draped.    The attention was turned to the right knee.  The Esmarch used for exsanguination and the tourniquet inflated to 285 mmHg pressure.  The inferior medial and inferolateral portals made.  The cannula inserted the camera inserted.  Patient noted to have a fulminant peripatellar synovitis with lateral subluxation of patella.  The patient also  noted to have chondromalacia of the odd facet.  The shaver inserted and the chondroplasty performed.  Patient noted to have grade 4 chondromalacia.  The awl inserted the microfracture patella performed.  The curette inserted and the abrasion arthroplasty performed.  The lateral release was made 1.5 cm lateral to the lateral edge of the patella.  The lateral release extended 2 cm into the vastus lateralis.  The medial gutter was clear and lateral gutter was clear the medial compartment the patient noted to have inflamed synovium protruding into the joint.  The shaver inserted and the partial synovectomy  performed there.  The patient noted to have grade 3 chondral malacia of the mediofemoral condyle.  The shaver inserted and the chondroplasty performed there.  The patient noted to have inflamed synovium and intercondylar notch protruding into the popliteal area.  The shaver inserted and the synovectomy performed in those areas.  The anterior cruciate ligament was noted to be intact and PCL noted to be intact.  The lateral compartment the patient noted to have inflamed synovium protruding into the joint.  Inserted the synovectomy performed.  The patient noted to have degenerative tear of the posterior horn of the lateral meniscus.  The shaver and biter inserted and the partial endoscopic lateral meniscectomy performed.  The remnant of the lateral meniscus noted to be beveled and stable.  Patient noted to have grade 3 chondromalacia of the lateral edge of the lateral femoral condyle.  The shaver inserted and the chondroplasty performed.  The fluid exsanguinated from the knee.  The inferior medial and inferolateral portals closed using interrupted 3-0 nylon sutures.  The tourniquet was deflated.  The patient knee was injected with 0.25% Marcaine plain and 1% Xylocaine plain.    The attention turned to the right middle finger.  The Esmarch used for exsanguination and the tourniquet inflated to 250 mmHg pressure.  An  oblique incision was made over the A1 pulley of the middle finger.  Dissection carried bluntly through the subcutaneous tennis tissue down to the A1 pulley was noted to be thickened and fibrotic.  The A1 pulley was incised and a 2 mm section of the A1 pulley was resected.  The patient noted to have inflamed synovium.  The inflamed synovium was excised.  The wound was irrigated.  The overlying skin opposed with a running 3-0 nylon suture.  Betadine gauze and an Ace wrap was placed and the tourniquet was deflated.               Jayro Pedraza M.D.

## 2017-06-16 NOTE — PROGRESS NOTES
The patient is in pre-op.  I have signed off on her right knee and middle finger and  the consent is signed.  The H and P is updated and the orders have been placed.  I am in room #5    Jayro Pedraza M.D.

## 2017-06-16 NOTE — DISCHARGE SUMMARY
[facility] -   Brief Operative Note     SUMMARY     Surgery Date: 6/16/2017     Surgeon(s) and Role:     * Jayro Pedraza Sr., MD - Primary    Assisting Surgeon: None    Pre-op Diagnosis:  Trigger middle finger, unspecified laterality [M65.339]    Post-op Diagnosis:  Post-Op Diagnosis Codes:     * Trigger middle finger, unspecified laterality [M65.339]  Right middle finger trigger finger, tenosynovitis of the flexor tendon.  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       Procedure(s) (LRB):  RELEASE-FINGER-TRIGGER-right middle finger (Right)  ARTHROSCOPY-KNEE (Right)  SYNOVECTOMY-KNEE (Right)  ARTHROSCOPY-KNEE W/ CHONDROPLASTY (Right)  MICROFRACTURE (Right)  ARTHROSCOPY-MENISCECTOMY medial (Right)    Anesthesia: General    Description of the findings of the procedure:  Right middle finger trigger finger, tenosynovitis of the flexor tendon.  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     Findings/Key Components:  Right middle finger trigger finger, tenosynovitis of the flexor tendon.  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     Estimated Blood Loss: 2 cc         Specimens:   Specimen (12h ago through future)    None          Discharge Note    SUMMARY     Admit Date: 6/16/2017    Discharge Date and Time:  06/16/2017 8:49 AM    Hospital Course (synopsis of major diagnoses, care, treatment, and services provided during the course of the hospital stay): without complications      Final Diagnosis: Post-Op Diagnosis Codes:     * Trigger middle finger, unspecified laterality [M65.339]  Right middle finger trigger finger, tenosynovitis of the flexor tendon.  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      Disposition: Home or Self Care    Follow Up/Patient Instructions: follow up in 2 weeks, elevate the extremities to 5 cm above the level of the heart.  Crutch assisted ambulation weight-bear as tolerated right lower extremity.    Medications:Percocet  Reconciled Home Medications:   Current Discharge Medication List      CONTINUE these medications which have NOT CHANGED    Details   amitriptyline (ELAVIL) 100 MG tablet TAKE 1 TABLET NIGHTLY  Qty: 90 tablet, Refills: 3      b complex vitamins tablet Take 1 tablet by mouth every evening.       calcium citrate (CALCITRATE) 200 mg (950 mg) tablet Take 1 tablet by mouth once daily. Ca 630 mg/Vit D 500IU - Pt takes 1 tab three times daily      eszopiclone 3 mg Tab TAKE 1 TABLET EVERY NIGHT AS NEEDED  Qty: 90 tablet, Refills: 0    Associated Diagnoses: Insomnia, unspecified type      ferrous sulfate 325 (65 FE) MG EC tablet Take 18 mg by mouth once daily.       metformin (GLUCOPHAGE-XR) 500 MG 24 hr tablet TAKE 3 TABLETS ONE TIME DAILY  Qty: 270 tablet, Refills: 0      metoprolol succinate (TOPROL-XL) 50 MG 24 hr tablet Take 1 tablet (50 mg total) by mouth once daily.  Qty: 90 tablet, Refills: 1    Associated Diagnoses: Secondary hypertension      multivitamin (ONE DAILY MULTIVITAMIN) per tablet Take 1 tablet by mouth 2 (two) times daily.       omeprazole (PRILOSEC) 20 MG capsule Take 1 capsule (20 mg total) by mouth once daily.  Qty: 90 capsule, Refills: 1      pravastatin (PRAVACHOL) 40 MG tablet TAKE 1 TABLET ONE TIME DAILY  Qty: 30 tablet, Refills: 0      ACCU-CHEK FASTCLIX Misc USE ONE TIME DAILY  Qty: 100 each, Refills: 3    Associated Diagnoses: DM II (diabetes mellitus, type II), controlled      ACCU-CHEK SMARTVIEW TEST STRIP Strp USE TO TEST ONCE DAILY  Qty: 100 strip, Refills: 3    Associated Diagnoses: DM II (diabetes mellitus, type II), controlled      ALBUTEROL SULFATE (VENTOLIN HFA INHL) Inhale 1 puff into the lungs every 4 (four) hours as needed.     "  blood-glucose meter kit Use as instructed  Qty: 1 each, Refills: 0    Comments: accu-chek laya connect meter       cyanocobalamin, vitamin B-12, (VITAMIN B-12) 50 mcg tablet Take 50 mcg by mouth once daily.         STOP taking these medications       hydrocodone-acetaminophen 5-325mg (NORCO) 5-325 mg per tablet Comments:   Reason for Stopping:               Discharge Procedure Orders  CRUTCHES FOR HOME USE   Order Specific Question Answer Comments   Type: Axillary    Height: 5' 3" (1.6 m)    Weight: 82.1 kg (181 lb)    Length of need (1-99 months): 3      Referral to Physical therapy   Referral Priority: Routine Referral Type: Physical Medicine   Referral Reason: Specialty Services Required    Requested Specialty: Physical Therapy    Number of Visits Requested: 1      Diet general     Call MD for:  temperature >100.4     Call MD for:  persistent nausea and vomiting     Call MD for:  severe uncontrolled pain     Call MD for:  difficulty breathing, headache or visual disturbances     Call MD for:  redness, tenderness, or signs of infection (pain, swelling, redness, odor or green/yellow discharge around incision site)     Call MD for:  hives     Call MD for:  persistent dizziness or light-headedness     Call MD for:  extreme fatigue     Other restrictions (specify):   Order Comments: Assisted ambulation weightbearing as tolerated, keep the knee immobilizer in place, except when performing range of motion and strengthening exercises with physical therapy.     Remove dressing in 48 hours   Order Comments: Apply Betadine gauze and an Ace wrap daily after 48 hours.  Reinforce the dressing with gauze and an Ace wrap with bleeding through the bandage.       Follow-up Information     Jayro Pedraza Sr, MD.    Specialty:  Orthopedic Surgery  Why:  As needed, If symptoms worsen, For suture removal, For wound re-check  Contact information:  [address]  [place] [address] [phone]                 "

## 2017-06-16 NOTE — TRANSFER OF CARE
"Anesthesia Transfer of Care Note    Patient: Cassandra Campos    Procedure(s) Performed: Procedure(s) (LRB):  RELEASE-FINGER-TRIGGER-right middle finger (Right)  ARTHROSCOPY-KNEE (Right)  SYNOVECTOMY-KNEE (Right)  ARTHROSCOPY-KNEE W/ CHONDROPLASTY (Right)  MICROFRACTURE (Right)  ARTHROSCOPY-MENISCECTOMY medial (Right)    Patient location: PACU    Anesthesia Type: general    Transport from OR: Transported from OR on room air with adequate spontaneous ventilation    Post pain: adequate analgesia    Post assessment: no apparent anesthetic complications and tolerated procedure well    Post vital signs: stable    Level of consciousness: sedated    Nausea/Vomiting: no nausea/vomiting    Complications: none    Transfer of care protocol was followed      Last vitals:   Visit Vitals  Ht 5' 3" (1.6 m)   Wt 82.1 kg (181 lb)   Breastfeeding? No   BMI 32.06 kg/m²     "

## 2017-06-16 NOTE — DISCHARGE INSTRUCTIONS
General Information:    1.  Do not drink alcoholic beverages including beer for 24 hours or as long as you are on pain medication..  2.  Do not drive a motor vehicle, operate machinery or power tools, or signs legal papers for 24 hours or as long as you are on pain medication.   3.  You may experience light-headedness, dizziness, and sleepiness following surgery. Please do not stay alone. A responsible adult should be with you for this 24 hour period.  4.  Go home and rest.  5. Progress slowly to a normal diet unless instructed.  Otherwise, begin with liquids such as soft drinks, then soup and crackers working up to solid foods. Drink plenty of nonalcoholic fluids.  6.  Certain anesthetics and pain medications produce nausea and vomiting in certain       individuals. If nausea becomes a problem at home, call you doctor.  7. A nurse will be calling you sometime after surgery. Do not be alarmed. This is our way of finding out how you are doing.  8. Several times every hour while you are awake, take 2-3 deep breaths and cough. If you had stomach surgery hold a pillow or rolled towel firmly against your stomach before you cough. This will help with any pain the cough might cause.  9. Several times every hour while you are awake, pump and flex your feet 5-6 times and do foot circles. This will help prevent blood clots.  10.Call your doctor for severe pain, bleeding, fever, or signs or symptoms of infection (pain, swelling, redness, foul odor, drainage).  11.You can contact your doctor anytime by [phone] for the [facility] (at [facility]) or [phone] for the [facility] on [facility].   [email or web address] is another way to contact your doctor if you are an active participant online with [facility].        Discharge Instructions for Knee Arthroscopy  You had knee arthroscopy. This surgical procedure uses small incisions to locate, identify, and treat problems inside the knee. These problems  include loose bodies, meniscal tears, bone spurs, osteochondritis dissecans (OCD), and synovitis. Below are tips to help speed your recovery from surgery.  Activity  · Dont drive until your doctor says its OK. And never drive while taking opioid pain medicine.  · Remember to take pain medicines as directed; dont wait for the pain to get bad. And don't drink alcohol while taking pain medicines.  · Follow weight-bearing instructions given by your doctor. He or she may require you to use crutches to keep weight off your knee.  · Unless your doctor tells you otherwise, begin using the affected knee as much as you can tolerate 3 days after surgery.  · Slowly bend and straighten your affected leg as far as you can, unless your doctor tells you otherwise. Do this several times a day.  · Rest your knee by lying down and putting pillows under it for the first 3 days after surgery. Keep your ankle elevated above the level of your heart. This helps keep swelling down.  · Follow your doctors instructions about wearing and caring for a brace, immobilizer, or elastic dressing.  · Point and flex your foot, and rotate your ankle as much as possible during the first few weeks following surgery. Also, wiggle your toes as much as possible.  Incision care  · Check your incision daily for redness, tenderness, or drainage.  · Dont be alarmed if there is some bruising, slight swelling of the knee, or a small amount of blood on the bandage.  · Adjust the bandage or brace as needed. It should feel supportive on your knee, but not too tight.   · Dont soak your incision in water (no hot tubs, bathtubs, swimming pools) until your doctor says its OK.  · Wait 2 day(s) after your surgery to begin showering. Then shower as needed. Cover your knee with plastic to keep the dressing or brace dry. Once your dressing is removed, follow your doctors instructions for care of the wound. And sit on a shower stool so that you dont fall while  showering.  · Use an ice pack or bag of frozen peas--or something similar--wrapped in a thin towel to reduce the swelling. Keep the foot elevated while you ice the knee. Apply the ice pack for 20 minutes; then remove it for 20 minutes. Repeat as needed. Icing helps reduce swelling.  Other precautions  · Arrange your household to keep the items you need within reach.  · Remove throw rugs, electrical cords, and anything else that may cause you to fall.  · Use nonslip bath mats, grab bars, an elevated toilet seat, and a shower chair in your bathroom.  · Use a cane, crutches, a walker, or handrails until your balance, flexibility, and strength improve, and you can put weight on your leg. And remember to ask for help from others when you need it.  · Free up your hands so that you can use them to keep balance. Use a jason pack, apron, or pockets to carry things.  Follow-up  Make a follow-up appointment as directed by your doctor.     When to seek medical attention  Call 911 right away if you have any of the following:  · Chest pain  · Shortness of breath  · Severe nausea  Otherwise, call your doctor immediately if you have any of the following:  · Pain that is not relieved by medicine or rest  · Continued bleeding through the bandage  · Tingling, numbness, or coldness in your foot or leg  · Fever above 100.4°F (38.0°C) or shaking chills  · Excessive swelling, increased redness, or any drainage around the incision  · Swelling, tenderness, or pain in your leg      Date Last Reviewed: 11/16/2015 © 2000-2016 [employer]. [address] [place] [address], [place] [address]. All rights reserved. This information is not intended as a substitute for professional medical care. Always follow your healthcare professional's instructions.      Treating Trigger Finger     The tendon sheath is opened to release the tendon. Once the tendon can move freely again, the finger can bend and straighten more normally.     Trigger finger  occurs when the tissue inside your finger or thumb becomes inflamed. Mild cases can be treated without surgery. If the problem is severe, surgery may be needed. Your doctor will discuss your options with you.  Nonsurgical treatment  For mild symptoms, your doctor may have you rest the finger or thumb. You may also be told to take anti-inflammatory medicines. These include ibuprofen or aspirin. You may be given an injection of medicine in the base of the finger or thumb. This typically is a steroid, such as cortisone.  Surgery  If nonsurgical treatments dont ease your symptoms, surgery may be recommended. A tendon is a cordlike fiber that attaches muscle to bone and allows joints to bend. The tendon is surrounded by a protective cover called a sheath. During surgery, the sheath in your finger or thumb is opened to enlarge the space and release the swollen tendon. This allows the finger or thumb to bend and straighten normally. Surgery takes about 20 minutes. It can often be done using a local anesthetic. You may be able to go home the same day. Your hand will be wrapped in a soft bandage. You may need to wear a plaster splint for a short time to keep the finger or thumb still as it heals. The stitches will be removed in about 2 weeks. Your doctor can discuss the risks and benefits of surgery with you.  Date Last Reviewed: 9/21/2015 © 2000-2016 [employer]. [address] [place] [address], [place] [address]. All rights reserved. This information is not intended as a substitute for professional medical care. Always follow your healthcare professional's instructions.          Acetaminophen; Oxycodone tablets  What is this medicine?  ACETAMINOPHEN; OXYCODONE (a set a KUSUM josé miguel fen; ox i KOE done) is a pain reliever. It is used to treat moderate to severe pain.  How should I use this medicine?  Take this medicine by mouth with a full glass of water. Follow the directions on the prescription label. You can take it  with or without food. If it upsets your stomach, take it with food. Take your medicine at regular intervals. Do not take it more often than directed.  A special MedGuide will be given to you by the pharmacist with each prescription and refill. Be sure to read this information carefully each time.  Talk to your pediatrician regarding the use of this medicine in children. Special care may be needed.  What side effects may I notice from receiving this medicine?  Side effects that you should report to your doctor or health care professional as soon as possible:  · allergic reactions like skin rash, itching or hives, swelling of the face, lips, or tongue  · breathing problems  · confusion  · redness, blistering, peeling or loosening of the skin, including inside the mouth  · signs and symptoms of liver injury like dark yellow or brown urine; general ill feeling or flu-like symptoms; light-colored stools; loss of appetite; nausea; right upper belly pain; unusually weak or tired; yellowing of the eyes or skin  · signs and symptoms of low blood pressure like dizziness; feeling faint or lightheaded, falls; unusually weak or tired  · trouble passing urine or change in the amount of urine  Side effects that usually do not require medical attention (report to your doctor or health care professional if they continue or are bothersome):  · constipation  · dry mouth  · nausea, vomiting  · tiredness  What may interact with this medicine?  This medicine may interact with the following medications:  · alcohol  · antihistamines for allergy, cough and cold  · antiviral medicines for HIV or AIDS  · atropine  · certain antibiotics like clarithromycin, erythromycin, linezolid, rifampin  · certain medicines for anxiety or sleep  · certain medicines for bladder problems like oxybutynin, tolterodine  · certain medicines for depression like amitriptyline, fluoxetine, sertraline  · certain medicines for fungal infections like ketoconazole,  itraconazole, voriconazole  · certain medicines for migraine headache like almotriptan, eletriptan, frovatriptan, naratriptan, rizatriptan, sumatriptan, zolmitriptan  · certain medicines for nausea or vomiting like dolasetron, ondansetron, palonosetron  · certain medicines for Parkinson's disease like benztropine, trihexyphenidyl  · certain medicines for seizures like phenobarbital, phenytoin, primidone  · certain medicines for stomach problems like dicyclomine, hyoscyamine  · certain medicines for travel sickness like scopolamine  · diuretics  · general anesthetics like halothane, isoflurane, methoxyflurane, propofol  · ipratropium  · local anesthetics like lidocaine, pramoxine, tetracaine  · MAOIs like Carbex, Eldepryl, Marplan, Nardil, and Parnate  · medicines that relax muscles for surgery  · methylene blue  · nilotinib  · other medicines with acetaminophen  · other narcotic medicines for pain or cough  · phenothiazines like chlorpromazine, mesoridazine, prochlorperazine, thioridazine  What if I miss a dose?  If you miss a dose, take it as soon as you can. If it is almost time for your next dose, take only that dose. Do not take double or extra doses.  Where should I keep my medicine?  Keep out of the reach of children. This medicine can be abused. Keep your medicine in a safe place to protect it from theft. Do not share this medicine with anyone. Selling or giving away this medicine is dangerous and against the law.  This medicine may cause accidental overdose and death if it taken by other adults, children, or pets. Mix any unused medicine with a substance like cat litter or coffee grounds. Then throw the medicine away in a sealed container like a sealed bag or a coffee can with a lid. Do not use the medicine after the expiration date.  Store at room temperature between 20 and 25 degrees C (68 and 77 degrees F).  What should I tell my health care provider before I take this medicine?  They need to know if you  have any of these conditions:  · brain tumor  · Crohn's disease, inflammatory bowel disease, or ulcerative colitis  · drug abuse or addiction  · head injury  · heart or circulation problems  · if you often drink alcohol  · kidney disease or problems going to the bathroom  · liver disease  · lung disease, asthma, or breathing problems  · an unusual or allergic reaction to acetaminophen, oxycodone, other opioid analgesics, other medicines, foods, dyes, or preservatives  · pregnant or trying to get pregnant  · breast-feeding  What should I watch for while using this medicine?  Tell your doctor or health care professional if your pain does not go away, if it gets worse, or if you have new or a different type of pain. You may develop tolerance to the medicine. Tolerance means that you will need a higher dose of the medication for pain relief. Tolerance is normal and is expected if you take this medicine for a long time.  Do not suddenly stop taking your medicine because you may develop a severe reaction. Your body becomes used to the medicine. This does NOT mean you are addicted. Addiction is a behavior related to getting and using a drug for a non-medical reason. If you have pain, you have a medical reason to take pain medicine. Your doctor will tell you how much medicine to take. If your doctor wants you to stop the medicine, the dose will be slowly lowered over time to avoid any side effects.  There are different types of narcotic medicines (opiates). If you take more than one type at the same time or if you are taking another medicine that also causes drowsiness, you may have more side effects. Give your health care provider a list of all medicines you use. Your doctor will tell you how much medicine to take. Do not take more medicine than directed. Call emergency for help if you have problems breathing or unusual sleepiness.  Do not take other medicines that contain acetaminophen with this medicine. Always read  labels carefully. If you have questions, ask your doctor or pharmacist.  If you take too much acetaminophen get medical help right away. Too much acetaminophen can be very dangerous and cause liver damage. Even if you do not have symptoms, it is important to get help right away.  You may get drowsy or dizzy. Do not drive, use machinery, or do anything that needs mental alertness until you know how this medicine affects you. Do not stand or sit up quickly, especially if you are an older patient. This reduces the risk of dizzy or fainting spells. Alcohol may interfere with the effect of this medicine. Avoid alcoholic drinks.  The medicine will cause constipation. Try to have a bowel movement at least every 2 to 3 days. If you do not have a bowel movement for 3 days, call your doctor or health care professional.  Your mouth may get dry. Chewing sugarless gum or sucking hard candy, and drinking plenty or water may help. Contact your doctor if the problem does not go away or is severe.  Date Last Reviewed:   NOTE:This sheet is a summary. It may not cover all possible information. If you have questions about this medicine, talk to your doctor, pharmacist, or health care provider. Copyright© 2016 [employer]        Crutch Walking  Crutch adjustment    Make sure the crutches you use are adjusted to fit you. When you stand, there should be room to fit 2 to 3 fingers between the top of the crutch and your armpit. Your elbow should be slightly bent when holding the hand . When your arms hang down, the crutch handle should be at the top of your hip.   Crutch walking  Place the crutches forward about 1 foot in front of you. The crutches should be a little farther apart than your body. Lean your weight forward as you push down on the hand . Make sure your weight is on your hands and your strong leg, not your armpits. Let your body swing forward, landing on the strong leg. Move the crutches forward again. The crutch  and your injured leg should move together.  Going up steps with no handrails  (Up with the good leg)  · With both crutches (under each armpit) on the same step as your feet, push down on the hand .  · Balancing with very light pressure on the weak leg, let your hands support your weight. Raise your strong leg onto the next higher step.  · Transfer all your weight to your strong leg (still bent). Move the crutches up to the next step, next to your strong leg.  · Keep your weight evenly balanced on the two crutches and your strong leg. Straighten your strong knee as you raise your weak leg up to the next step.  Going down steps with no handrails  (Down with the bad leg)  · With both crutches (under each armpit) on the same step as your feet, push down on the hand .  · Keep your weight evenly balanced on the two crutches and your strong leg. Bend your strong knee as you lower your weak leg down to the next step.  · Let your strong leg support you (still bent) as you move the crutches down next to the weak leg.  · Transfer your weight to your hands. Balance with very light pressure on your weak leg as you lower your strong leg next to your weak leg  Going up steps with handrails  (Up with the good leg)  · Face the stairs, holding the handrail with one hand. Place both crutches under your armpit on the opposite side. Push down on the hand .  · Balancing with very light pressure on the weak leg, let your hands support your weight. Raise your strong leg onto the next higher step.  · Transfer all your weight to your strong leg (still bent) as you move the crutches up (while holding on to the handrail) to the next step next to the strong leg.  · Keep your weight evenly balanced on the handrail, the crutches (still under the same armpit opposite the handrail), and your strong leg. Straighten your strong knee as you raise the weak leg up to the next step.  Going down steps with handrails  (Down with the bad  leg)  · Face the stairs, holding the handrail with one hand. Place both crutches under your armpit on the opposite side. Push down on the hand .  · Balance your weight evenly on the crutches, handrail, and your strong leg. Then bend your strong knee as you lower the weak leg down to the next step.  · Let the handrail and your strong leg support you (still bent) as you move the crutches down alongside the weak leg.  · While holding on to the handrail and crutches (under the same armpit on the other side), transfer your weight to your hands, balancing with very light pressure on the weak leg as you lower your strong leg alongside your weak leg  Tip: If you are worried about falling or you feel unsteady, try sitting when going up or down stairs instead. Sit on the bottom step and keep your injured leg out in front of you. Hold your crutches flat against the stairs. Then slide up to the next step on your bottom. Use your free hand and good leg for support. Face the same way when going down stairs.  Date Last Reviewed: 10/6/2015  © 2217-3710 [employer]. [address] [place] [address], [place] [address]. All rights reserved. This information is not intended as a substitute for professional medical care. Always follow your healthcare professional's instructions.        Discharge Instructions: Using Crutches (Weight-Bearing)  Your healthcare provider has prescribed crutches for you. A healthy leg can support your body weight, but when you have an injured leg or foot, you need to keep weight off it. Once you are told that you can put some weight on your leg, use a weight-bearing method of walking as the leg heals. Depending on your arm strength and balance, you can either step to or step through. Practice will help you learn to step through so that you can cover more ground with each step.      Before you use crutches  Be prepared with the following tips:  · Remove throw rugs, electrical cords, and anything  else that may cause you to fall.  · Arrange your household to keep the items you need handy. Keep everything else out of the way.  · Find a backpack, jason pack, or apron, or use pockets to carry things. This will help you keep your hands free.  Standing with crutches  Use the balanced standing (tripod) position when you start or end a movement. Also use it whenever you're standing for a length of time.  · Move your crutches in front of you about 12 inches.  · Find your balance.  · Be sure not to rest your armpits on the pads.  Walking with crutches  Follow these tips:  · Start in a balanced standing (tripod) position.  · Step forward with your affected foot.  · Land lightly between your crutches.  · Squeeze the pads against the sides of your chest.  · Support your weight with your hands and your affected leg.  · Press down on the handgrips.  Step to  This method includes the following:  · Lift your unaffected foot and step to the crutches.  · Land on your unaffected foot, between your crutches.  · Keep the knee slightly bent.  · Reach forward and out with the crutches to begin the next step.  Step through  This method includes the following:  · Lift the unaffected foot.  · Step forward through the crutches.  · Land on the unaffected foot, with the heel slightly in front of the toe of the other foot.  · Keep the knee slightly bent.  · Reach forward and out with the crutches to begin the next step.  Follow-up  Make a follow-up appointment as directed by your healthcare provider.     When to call your healthcare provider  Call your healthcare provider right away if you have any of the following:  · Sudden or increased shortness of breath  · Sudden chest pain or localized chest pain with coughing  · Fever above 100.4°F (38.0°C)  · Increasing redness, tenderness, or swelling at theincision site or in the injured limb  · Drainage from the incision or injured limb  · Opening of the incision or injury  · Increasing pain,  with or without activity   Date Last Reviewed: 8/1/2016  © 1891-4377 [employer]. [address] [place] [address], [place] [address]. All rights reserved. This information is not intended as a substitute for professional medical care. Always follow your healthcare professional's instructions.

## 2017-06-16 NOTE — PLAN OF CARE
Pt stating pain level is tolerable. Respirations even and unlabored on room air. Both dsgs remain c/d/i. VSS. Pt meets d/c criteria. Will cont to monitor. See flow sheet for detailed assessment.

## 2017-06-16 NOTE — ANESTHESIA POSTPROCEDURE EVALUATION
"Anesthesia Post Evaluation    Patient: Cassandra Campos    Procedure(s) Performed: Procedure(s) (LRB):  RELEASE-FINGER-TRIGGER-right middle finger (Right)  ARTHROSCOPY-KNEE (Right)  SYNOVECTOMY-KNEE (Right)  ARTHROSCOPY-KNEE W/ CHONDROPLASTY (Right)  MICROFRACTURE (Right)  ARTHROSCOPY-MENISCECTOMY medial (Right)    Final Anesthesia Type: general  Patient location during evaluation: PACU  Patient participation: Yes- Able to Participate  Level of consciousness: awake and alert  Post-procedure vital signs: reviewed and stable  Pain management: adequate  Airway patency: patent  PONV status at discharge: No PONV  Anesthetic complications: no      Cardiovascular status: stable  Respiratory status: unassisted  Hydration status: euvolemic  Follow-up not needed.        Visit Vitals  BP (!) 97/57   Pulse 91   Temp 37.1 °C (98.8 °F) (Temporal)   Resp 20   Ht 5' 3" (1.6 m)   Wt 82.1 kg (181 lb)   SpO2 (!) 92%   Breastfeeding? No   BMI 32.06 kg/m²       Pain/Francheska Score: Pain Assessment Performed: Yes (6/16/2017 10:23 AM)  Presence of Pain: complains of pain/discomfort (6/16/2017 10:23 AM)  Pain Rating Prior to Med Admin: 5 (6/16/2017  9:32 AM)  Francheska Score: 10 (6/16/2017 10:23 AM)      "

## 2017-06-16 NOTE — H&P
CC:This is a 66-year-old female that complains of right Middle finger triggering and left elbow pain.  She also complains of right knee pain.     HPI:The patient has had  Persistent right middle finger triggering for over 6 months as well as left elbow tenderness and pain.     PMH:         Past Medical History:   Diagnosis Date    Borderline glaucoma      COPD (chronic obstructive pulmonary disease)       pt states she does not have copd    Diabetes mellitus, type 2      Gastritis      Hydradenitis      Hyperlipidemia      Hypertension      Insomnia      Migraines 2000    Nasal septum perforation      Sleep apnea      SVT (supraventricular tachycardia) 2013    Type II or unspecified type diabetes mellitus without mention of complication, not stated as uncontrolled 2013         PSH:          Past Surgical History:   Procedure Laterality Date    AXILLARY HIDRADENITIS EXCISION        BREAST LUMPECTOMY Bilateral 1998     Benign    CARPAL TUNNEL RELEASE         bilateral    CATARACT EXTRACTION         OU     SECTION        CHOLECYSTECTOMY   2014    CYST REMOVAL   2015     sebaceous cyst removed from face    TONSILLECTOMY, ADENOIDECTOMY        TRIGGER FINGER RELEASE Right 2015     Dr. Pedraza         Family Hx:          Family History   Problem Relation Age of Onset    Prostate cancer Brother      Diabetes Maternal Aunt           Allergy:  Review of patient's allergies indicates:  No Known Allergies     Medication:    Current Outpatient Prescriptions:     ACCU-CHEK FASTCLIX Misc, USE ONE TIME DAILY, Disp: 100 each, Rfl: 3    ACCU-CHEK SMARTVIEW TEST STRIP Strp, USE TO TEST ONCE DAILY, Disp: 100 strip, Rfl: 3    ALBUTEROL SULFATE (VENTOLIN HFA INHL), Inhale 1 puff into the lungs every 4 (four) hours as needed., Disp: , Rfl:     amitriptyline (ELAVIL) 100 MG tablet, TAKE 1 TABLET NIGHTLY, Disp: 90 tablet, Rfl: 3    b complex vitamins tablet, Take 1 tablet  by mouth once daily., Disp: , Rfl:     blood-glucose meter kit, Use as instructed, Disp: 1 each, Rfl: 0    calcium citrate (CALCITRATE) 200 mg (950 mg) tablet, Take 1 tablet by mouth once daily. Ca 630 mg/Vit D 500IU - Pt takes 1 tab three times daily, Disp: , Rfl:     cyanocobalamin, vitamin B-12, (VITAMIN B-12) 50 mcg tablet, Take 50 mcg by mouth once daily., Disp: , Rfl:     eszopiclone 3 mg Tab, TAKE 1 TABLET EVERY NIGHT AS NEEDED, Disp: 90 tablet, Rfl: 0    ferrous sulfate 325 (65 FE) MG EC tablet, Take 18 mg by mouth once daily. , Disp: , Rfl:     metformin (GLUCOPHAGE-XR) 500 MG 24 hr tablet, TAKE 3 TABLETS ONE TIME DAILY, Disp: 270 tablet, Rfl: 0    metoprolol succinate (TOPROL-XL) 50 MG 24 hr tablet, Take 1 tablet (50 mg total) by mouth once daily., Disp: 90 tablet, Rfl: 1    multivitamin (ONE DAILY MULTIVITAMIN) per tablet, Take 1 tablet by mouth 2 (two) times daily. , Disp: , Rfl:     nabumetone (RELAFEN) 500 MG tablet, Take 500 mg by mouth once daily., Disp: , Rfl:     omeprazole (PRILOSEC) 20 MG capsule, Take 1 capsule (20 mg total) by mouth once daily. (Patient taking differently: Take 20 mg by mouth every evening. ), Disp: 90 capsule, Rfl: 1    pravastatin (PRAVACHOL) 40 MG tablet, TAKE 1 TABLET ONE TIME DAILY (Patient taking differently: Take 40 mg by mouth every evening. TAKE 1 TABLET ONE TIME DAILY), Disp: 30 tablet, Rfl: 0     Social History:     Social History    Social History            Social History    Marital status:        Spouse name: N/A    Number of children: 1    Years of education: N/A           Occupational History     aid               Social History Main Topics    Smoking status: Former Smoker       Packs/day: 0.50       Years: 30.00       Types: Cigarettes       Start date: 1/1/2012    Smokeless tobacco: Never Used    Alcohol use Yes         Comment: rarely // wine  No alcohol prior to surgery    Drug use: No    Sexual activity: Not  "Currently           Other Topics Concern    Not on file          Social History Narrative     Single, part-time teacher. Masters degree biology.             Vitals:     BP 98/60 (BP Location: Right arm, Patient Position: Sitting, BP Method: Automatic)   Pulse 74   Ht 5' 3" (1.6 m)   Wt 82.2 kg (181 lb 3.5 oz)   BMI 32.10 kg/m²       ROS:  GENERAL: No fever, chills, fatigability or weight loss.  SKIN: No rashes, itching or changes in color or texture of skin.  HEAD: No headaches or recent head trauma.  EYES: Visual acuity fine. No photophobia, ocular pain or diplopia.  EARS: Denies ear pain, discharge or vertigo.  NOSE: No loss of smell, no epistaxis or postnasal drip.  MOUTH & THROAT: No hoarseness or change in voice. No excessive gum bleeding.  NODES: Denies swollen glands.  CHEST: Denies BOGGS, cyanosis, wheezing, cough and sputum production.  CARDIOVASCULAR: Denies chest pain, PND, orthopnea or reduced exercise tolerance.  ABDOMEN: Appetite fine. No weight loss. Denies diarrhea, abdominal pain, hematemesis or blood in stool.  URINARY: No flank pain, dysuria or hematuria.  PERIPHERAL VASCULAR: No claudication or cyanosis.  NEUROLOGIC: No history of seizures, paralysis, alteration of gait or coordination.  MUSCULOSKELETAL: See HPI     PE:  APPEARANCE: Well nourished, well developed, in no acute distress.   HEAD: Normocephalic, atraumatic.  EYES: PERRL. EOMI.   EARS: TM's intact. Light reflex normal. No retraction or perforation.   NOSE: Mucosa pink. Airway clear.  MOUTH & THROAT: No tonsillar enlargement. No pharyngeal erythema or exudate. No stridor.  NECK: Supple.   NODES: No cervical, axillary or inguinal lymph node enlargement.  CHEST: Lungs clear to auscultation.  CARDIOVASCULAR: Normal S1, S2. No rubs, murmurs or gallops.  ABDOMEN: Bowel sounds normal. Not distended. Soft. No tenderness or masses.  NEUROLOGIC: Cranial Nerves: II-XII grossly intact, also see MUSCULOSKELETAL  MUSCULOSKELETAL:                 " Right middle finger-  2 plus radial  artery and ulnar artery pulses, light touch intact Right upper extremity.  All digits are warm. No erythema, no warmth, no drainage, No swelling, no significant tenderness. Triggering and locking of the right middle finger     Left elbow-  2 plus radial  artery and ulnar artery pulses, light touch intact Right upper extremity.  All digits are warm. No erythema, no warmth, no drainage, No swelling,  significant tenderness Over the lateral epicondyle.Tenderness and pain with resisted wrist extension at the lateral epicondyle.        Right  Knee Exam-abnormal     Gait-abnormal  Muscle Appearance:abnormal  Grooming:normal  Spine Alignment-normal  Muscle Atrophy-Positive  Deformities-Negative  Tenderness-Positive  Paresthesias-Negative  Range of Motion         Ext-normal, 0 degrees         Flex-abnormal  Muscle Strength-abnormal  Sensation-normal  Reflexes-normal  Crepitus-Positive                                Swelling-Negative  Effusion- Positive                                Edema-Negative  Lachman-Negative                                Erythema-Negative  Jostin's-Positive                              Apley Grind-Positive  Patellar Comp-Positive                         Alignment-normal/symmetric  Patellar Apprehension-Negative              Synovial fullness-Positive  Passive Patellar Tilt-normal  Patellar Tracking-normal   Patellar Glide-normal  Q-Angle at 90 degrees-normal  Patellar Grind-abnormal  X-Vuiu-Sfwoeoju  Fatigue-Negative                                     HS Tightness-Negative  Tests on Exam, No ligamentous laxity  Neurovascular Status-normal+2 DP and PT artery pulses  Skin-normal        Assessment:            Diagnosis:              1.right  Middle trigger finger               2. Left lateral epicondylitis               3. Right knee chondromalacia and synovitis     Diagnostic Studies  MRI-No  X-Ray-No  EMG/NCV-No  Arthrogram-No  Bone Scan-No  CT  Scan-No  Doppler-No  ESR-No  CRP-No  CBC with Diff-No   Rheumatoid/Arthritis Panel-No        Plan:                                                  1. PT-no                                                 2.OT-Yes, left elbow                                          3.NSAID-no                                        4. Narcotics-no                                     5. Wound care-No                                 6. Rest-yes                                           7. Surgery-yes , Right middle trigger finger release and right knee arthroscope.                                       8. VAZQUEZ Hose-no                                    9. Anticoagulation therapy-no               10. Elevation-no                                     11. Crutches-no                                    12. Walker-no             13. Cane no                        14. Referral-no                                     15.Injection-no                            16. Splint Left elbow            17. RICE-none            18. Follow up- 3 weeks

## 2017-06-20 RX ORDER — METFORMIN HYDROCHLORIDE 500 MG/1
TABLET, EXTENDED RELEASE ORAL
Qty: 270 TABLET | Refills: 0 | Status: SHIPPED | OUTPATIENT
Start: 2017-06-20 | End: 2018-02-12 | Stop reason: SDUPTHER

## 2017-06-24 ENCOUNTER — NURSE TRIAGE (OUTPATIENT)
Dept: ADMINISTRATIVE | Facility: CLINIC | Age: 67
End: 2017-06-24

## 2017-06-24 ENCOUNTER — PATIENT MESSAGE (OUTPATIENT)
Dept: ORTHOPEDICS | Facility: CLINIC | Age: 67
End: 2017-06-24

## 2017-06-24 NOTE — TELEPHONE ENCOUNTER
Reason for Disposition   General activity, questions about    Protocols used: ST POST-OP SYMPTOMS AND [id number]    Pt calling with concerns of swelling to knee after procedure on 06/16/2017.  Advised to elevate knee above heart level.  Care advice given.

## 2017-06-25 ENCOUNTER — PATIENT MESSAGE (OUTPATIENT)
Dept: ORTHOPEDICS | Facility: CLINIC | Age: 67
End: 2017-06-25

## 2017-06-26 ENCOUNTER — PATIENT MESSAGE (OUTPATIENT)
Dept: INTERNAL MEDICINE | Facility: CLINIC | Age: 67
End: 2017-06-26

## 2017-06-26 DIAGNOSIS — R74.8 ELEVATED LIVER ENZYMES: Primary | ICD-10-CM

## 2017-06-28 ENCOUNTER — PATIENT MESSAGE (OUTPATIENT)
Dept: DIABETES | Facility: CLINIC | Age: 67
End: 2017-06-28

## 2017-06-29 ENCOUNTER — LAB VISIT (OUTPATIENT)
Dept: LAB | Facility: HOSPITAL | Age: 67
End: 2017-06-29
Attending: FAMILY MEDICINE
Payer: MEDICARE

## 2017-06-29 DIAGNOSIS — R74.8 ELEVATED LIVER ENZYMES: ICD-10-CM

## 2017-06-29 LAB
ALT SERPL W/O P-5'-P-CCNC: 65 U/L
AST SERPL-CCNC: 40 U/L

## 2017-06-29 PROCEDURE — 84450 TRANSFERASE (AST) (SGOT): CPT

## 2017-06-29 PROCEDURE — 36415 COLL VENOUS BLD VENIPUNCTURE: CPT | Mod: PO

## 2017-06-29 PROCEDURE — 84460 ALANINE AMINO (ALT) (SGPT): CPT

## 2017-06-30 ENCOUNTER — OFFICE VISIT (OUTPATIENT)
Dept: ORTHOPEDICS | Facility: CLINIC | Age: 67
End: 2017-06-30
Payer: MEDICARE

## 2017-06-30 VITALS
BODY MASS INDEX: 31.48 KG/M2 | TEMPERATURE: 99 F | WEIGHT: 177.69 LBS | HEIGHT: 63 IN | DIASTOLIC BLOOD PRESSURE: 77 MMHG | HEART RATE: 99 BPM | SYSTOLIC BLOOD PRESSURE: 110 MMHG

## 2017-06-30 DIAGNOSIS — Z98.890 POSTOPERATIVE STATE: Primary | ICD-10-CM

## 2017-06-30 PROCEDURE — 99024 POSTOP FOLLOW-UP VISIT: CPT | Mod: S$GLB,,, | Performed by: ORTHOPAEDIC SURGERY

## 2017-06-30 PROCEDURE — 99999 PR PBB SHADOW E&M-EST. PATIENT-LVL III: CPT | Mod: PBBFAC,,, | Performed by: ORTHOPAEDIC SURGERY

## 2017-06-30 RX ORDER — HYDROCODONE BITARTRATE AND ACETAMINOPHEN 10; 325 MG/1; MG/1
1 TABLET ORAL EVERY 4 HOURS PRN
Qty: 60 TABLET | Refills: 0 | Status: SHIPPED | OUTPATIENT
Start: 2017-06-30 | End: 2017-07-10

## 2017-06-30 RX ORDER — HYDROCODONE BITARTRATE AND ACETAMINOPHEN 10; 325 MG/1; MG/1
1 TABLET ORAL EVERY 4 HOURS PRN
Qty: 60 TABLET | Refills: 0 | Status: SHIPPED | OUTPATIENT
Start: 2017-06-30 | End: 2017-06-30

## 2017-06-30 NOTE — PROGRESS NOTES
SUBJECTIVE:  Patient is status post Right Knee arthroscope and middle finger trigger finger release.  Patient complains of 0/ 10 pain..     Past Medical History:   Diagnosis Date    Arthritis     hands    Borderline glaucoma     COPD (chronic obstructive pulmonary disease)     pt states she does not have copd    Diabetes mellitus, type 2     Gastritis     Gastritis     upper GI 2017    Hydradenitis     Hyperlipidemia     Hypertension     Insomnia     Migraines 2000    Morbid obesity due to excess calories 2016    Nasal septum perforation     Obesity     Pneumonia     Sleep apnea     SVT (supraventricular tachycardia) 2013    Type 2 diabetes mellitus     Type II or unspecified type diabetes mellitus without mention of complication, not stated as uncontrolled 2013     Past Surgical History:   Procedure Laterality Date    AXILLARY HIDRADENITIS EXCISION      BREAST LUMPECTOMY Bilateral 1998    Benign    CARPAL TUNNEL RELEASE      bilateral    CATARACT EXTRACTION      OU     SECTION  1979    CHOLECYSTECTOMY  2014    CYST REMOVAL  2015    sebaceous cyst removed from face    gastric sleeve  2017    Dr. Watson    TONSILLECTOMY, ADENOIDECTOMY  1980s    TRIGGER FINGER RELEASE Right 2015    Dr. Pedraza     Review of patient's allergies indicates:  No Known Allergies  Current Outpatient Prescriptions on File Prior to Visit   Medication Sig Dispense Refill    ACCU-CHEK FASTCLIX Misc USE ONE TIME DAILY 100 each 3    ACCU-CHEK SMARTVIEW TEST STRIP Strp USE TO TEST ONCE DAILY 100 strip 3    ALBUTEROL SULFATE (VENTOLIN HFA INHL) Inhale 1 puff into the lungs every 4 (four) hours as needed.      amitriptyline (ELAVIL) 100 MG tablet TAKE 1 TABLET NIGHTLY 90 tablet 3    b complex vitamins tablet Take 1 tablet by mouth every evening.       blood-glucose meter kit Use as instructed 1 each 0    calcium citrate (CALCITRATE) 200 mg (950 mg) tablet Take 1  "tablet by mouth once daily. Ca 630 mg/Vit D 500IU - Pt takes 1 tab three times daily      cyanocobalamin, vitamin B-12, (VITAMIN B-12) 50 mcg tablet Take 50 mcg by mouth once daily.      eszopiclone 3 mg Tab TAKE 1 TABLET EVERY NIGHT AS NEEDED 90 tablet 0    ferrous sulfate 325 (65 FE) MG EC tablet Take 18 mg by mouth once daily.       metformin (GLUCOPHAGE-XR) 500 MG 24 hr tablet TAKE 3 TABLETS ONE TIME DAILY 270 tablet 0    metoprolol succinate (TOPROL-XL) 50 MG 24 hr tablet Take 1 tablet (50 mg total) by mouth once daily. (Patient taking differently: Take 50 mg by mouth every evening. ) 90 tablet 1    multivitamin (ONE DAILY MULTIVITAMIN) per tablet Take 1 tablet by mouth 2 (two) times daily.       omeprazole (PRILOSEC) 20 MG capsule Take 1 capsule (20 mg total) by mouth once daily. (Patient taking differently: Take 20 mg by mouth every evening. ) 90 capsule 1    oxycodone-acetaminophen (PERCOCET)  mg per tablet Take 1 tablet by mouth every 4 (four) hours as needed. 90 tablet 0    pravastatin (PRAVACHOL) 40 MG tablet TAKE 1 TABLET ONE TIME DAILY (Patient taking differently: Take 40 mg by mouth every evening. TAKE 1 TABLET ONE TIME DAILY) 30 tablet 0     No current facility-administered medications on file prior to visit.      /77   Pulse 99   Temp 98.8 °F (37.1 °C)   Ht 5' 3" (1.6 m)   Wt 80.6 kg (177 lb 11.1 oz)   BMI 31.48 kg/m²    ROS:  GENERAL: No fever, chills, fatigability or weight loss.  SKIN: No rashes, itching or changes in color or texture of skin.  HEAD: No headaches or recent head trauma.  EYES: Visual acuity fine. No photophobia, ocular pain or diplopia.  EARS: Denies ear pain, discharge or vertigo.  NOSE: No loss of smell, no epistaxis or postnasal drip.  MOUTH & THROAT: No hoarseness or change in voice. No excessive gum bleeding.  NODES: Denies swollen glands.  CHEST: Denies BOGGS, cyanosis, wheezing, cough and sputum production.  CARDIOVASCULAR: Denies chest pain, PND, " orthopnea or reduced exercise tolerance.  ABDOMEN: Appetite fine. No weight loss. Denies diarrhea, abdominal pain, hematemesis or blood in stool.  URINARY: No flank pain, dysuria or hematuria.  PERIPHERAL VASCULAR: No claudication or cyanosis.  NEUROLOGIC: No history of seizures, paralysis, alteration of gait or coordination.  MUSCULOSKELETAL: See HPI    PE:  APPEARANCE: Well nourished, well developed, in no acute distress.   HEAD: Normocephalic, atraumatic.  EYES: PERRL. EOMI.   EARS: TM's intact. Light reflex normal. No retraction or perforation.   NOSE: Mucosa pink. Airway clear.  MOUTH & THROAT: No tonsillar enlargement. No pharyngeal erythema or exudate. No stridor.  NECK: Supple.   NODES: No cervical, axillary or inguinal lymph node enlargement.  CHEST: Lungs clear to auscultation.  CARDIOVASCULAR: Normal S1, S2. No rubs, murmurs or gallops.  ABDOMEN: Bowel sounds normal. Not distended. Soft. No tenderness or masses.  NEUROLOGIC: Cranial Nerves: II-XII grossly intact, also see MUSCULOSKELETAL  MUSCULOSKELETAL:          Right hand-  Dressing intact, 2 plus radial  artery and ulnar artery pulses, light touch intact Right upper extremity.  All digits are warm. No erythema, no warmth, no drainage, No swelling, no significant tenderness.  Good range of motion of the middle finger without triggering.              Right Knee  -dressing intact, 2 plus dorsalis pedis and posterior tibial artery pulses, light touch intact Right lower extremity.  All digits are warm. No erythema, no warmth, no drainage, minimal swelling, no significant tenderness.  Less than 2 seconds capillary refill all digits.      ASSESSMENT:    The patient is stable and improving.      PLAN:  Sutures removed  Follow-up in6 weeks.  Continue pain medication  Continue wound care  Continue physical therapy

## 2017-06-30 NOTE — PATIENT INSTRUCTIONS
Treating Trigger Finger     The tendon sheath is opened to release the tendon. Once the tendon can move freely again, the finger can bend and straighten more normally.     Trigger finger occurs when the tissue inside your finger or thumb becomes inflamed. Mild cases can be treated without surgery. If the problem is severe, surgery may be needed. Your doctor will discuss your options with you.  Nonsurgical treatment  For mild symptoms, your doctor may have you rest the finger or thumb. You may also be told to take anti-inflammatory medicines. These include ibuprofen or aspirin. You may be given an injection of medicine in the base of the finger or thumb. This typically is a steroid, such as cortisone.  Surgery  If nonsurgical treatments dont ease your symptoms, surgery may be recommended. A tendon is a cordlike fiber that attaches muscle to bone and allows joints to bend. The tendon is surrounded by a protective cover called a sheath. During surgery, the sheath in your finger or thumb is opened to enlarge the space and release the swollen tendon. This allows the finger or thumb to bend and straighten normally. Surgery takes about 20 minutes. It can often be done using a local anesthetic. You may be able to go home the same day. Your hand will be wrapped in a soft bandage. You may need to wear a plaster splint for a short time to keep the finger or thumb still as it heals. The stitches will be removed in about 2 weeks. Your doctor can discuss the risks and benefits of surgery with you.  Date Last Reviewed: 9/21/2015 © 2000-2016 [employer]. [address] [place] [address], [place] [address]. All rights reserved. This information is not intended as a substitute for professional medical care. Always follow your healthcare professional's instructions.

## 2017-07-02 ENCOUNTER — PATIENT MESSAGE (OUTPATIENT)
Dept: INTERNAL MEDICINE | Facility: CLINIC | Age: 67
End: 2017-07-02

## 2017-07-02 ENCOUNTER — PATIENT MESSAGE (OUTPATIENT)
Dept: ORTHOPEDICS | Facility: CLINIC | Age: 67
End: 2017-07-02

## 2017-07-02 DIAGNOSIS — G47.00 INSOMNIA, UNSPECIFIED TYPE: ICD-10-CM

## 2017-07-03 RX ORDER — ESZOPICLONE 3 MG/1
TABLET, FILM COATED ORAL
Qty: 90 TABLET | Refills: 0 | Status: SHIPPED | OUTPATIENT
Start: 2017-07-03 | End: 2017-09-19 | Stop reason: SDUPTHER

## 2017-07-10 ENCOUNTER — PATIENT MESSAGE (OUTPATIENT)
Dept: INTERNAL MEDICINE | Facility: CLINIC | Age: 67
End: 2017-07-10

## 2017-07-11 ENCOUNTER — PATIENT MESSAGE (OUTPATIENT)
Dept: ORTHOPEDICS | Facility: CLINIC | Age: 67
End: 2017-07-11

## 2017-07-13 ENCOUNTER — PATIENT MESSAGE (OUTPATIENT)
Dept: DIABETES | Facility: CLINIC | Age: 67
End: 2017-07-13

## 2017-07-26 ENCOUNTER — OFFICE VISIT (OUTPATIENT)
Dept: INTERNAL MEDICINE | Facility: CLINIC | Age: 67
End: 2017-07-26
Payer: MEDICARE

## 2017-07-26 ENCOUNTER — TELEPHONE (OUTPATIENT)
Dept: INTERNAL MEDICINE | Facility: CLINIC | Age: 67
End: 2017-07-26

## 2017-07-26 VITALS
TEMPERATURE: 98 F | DIASTOLIC BLOOD PRESSURE: 79 MMHG | BODY MASS INDEX: 30.86 KG/M2 | HEART RATE: 103 BPM | OXYGEN SATURATION: 97 % | WEIGHT: 180.75 LBS | SYSTOLIC BLOOD PRESSURE: 114 MMHG | HEIGHT: 64 IN

## 2017-07-26 DIAGNOSIS — E11.9 CONTROLLED TYPE 2 DIABETES MELLITUS WITHOUT COMPLICATION, WITHOUT LONG-TERM CURRENT USE OF INSULIN: ICD-10-CM

## 2017-07-26 DIAGNOSIS — R05.9 COUGH IN ADULT: Primary | ICD-10-CM

## 2017-07-26 DIAGNOSIS — K90.9 IMPAIRED INTESTINAL ABSORPTION: Primary | ICD-10-CM

## 2017-07-26 PROCEDURE — 1126F AMNT PAIN NOTED NONE PRSNT: CPT | Mod: S$GLB,,, | Performed by: FAMILY MEDICINE

## 2017-07-26 PROCEDURE — 99213 OFFICE O/P EST LOW 20 MIN: CPT | Mod: S$GLB,,, | Performed by: FAMILY MEDICINE

## 2017-07-26 PROCEDURE — 99999 PR PBB SHADOW E&M-EST. PATIENT-LVL IV: CPT | Mod: PBBFAC,,, | Performed by: FAMILY MEDICINE

## 2017-07-26 PROCEDURE — 1159F MED LIST DOCD IN RCRD: CPT | Mod: S$GLB,,, | Performed by: FAMILY MEDICINE

## 2017-07-26 PROCEDURE — 99499 UNLISTED E&M SERVICE: CPT | Mod: S$GLB,,, | Performed by: FAMILY MEDICINE

## 2017-07-26 PROCEDURE — 3044F HG A1C LEVEL LT 7.0%: CPT | Mod: S$GLB,,, | Performed by: FAMILY MEDICINE

## 2017-07-26 RX ORDER — PROMETHAZINE HYDROCHLORIDE AND CODEINE PHOSPHATE 6.25; 1 MG/5ML; MG/5ML
5 SOLUTION ORAL EVERY 4 HOURS PRN
Qty: 180 ML | Refills: 0 | Status: SHIPPED | OUTPATIENT
Start: 2017-07-26 | End: 2017-08-05

## 2017-07-26 RX ORDER — ALBUTEROL SULFATE 90 UG/1
2 AEROSOL, METERED RESPIRATORY (INHALATION) EVERY 6 HOURS PRN
Qty: 18 G | Refills: 0 | Status: SHIPPED | OUTPATIENT
Start: 2017-07-26 | End: 2017-08-01

## 2017-07-26 NOTE — TELEPHONE ENCOUNTER
Please advise if she can go back on her multivitamins.  She has been off of them for a month now and would like to start back

## 2017-07-26 NOTE — TELEPHONE ENCOUNTER
Spoke with the pt advised her that she could start back taking her centrum vitamins, but just too hold off on taking her other supplements until after her nurse visit in 2 wks.pt verbalized understanding of information given.

## 2017-07-26 NOTE — PATIENT INSTRUCTIONS
Step-by-Step  Using an Inhaler Without a Spacer       Date Last Reviewed: 5/29/2015  © 6653-7870 [employer], [place]. [address] [place] [address], [place] [address]. All rights reserved. This information is not intended as a substitute for professional medical care. Always follow your healthcare professional's instructions.

## 2017-07-26 NOTE — TELEPHONE ENCOUNTER
She can go back on her Centrum Silver now and then add the rest after we do her labs next week.  I reviewed the copies she left for me 7/17/17.    I will add B12 to the labs we are obtaining next week.

## 2017-07-26 NOTE — PROGRESS NOTES
Subjective:       Patient ID: Cassandra Campos is a 67 y.o. female.    Chief Complaint: Cough    Started a week ago with cough, not associated with head cold or other symptoms. It is a very wet cough.  She has used phenergan with codeine in the past and is looking for a refill on this if warrented.      Cough   This is a recurrent problem. The current episode started in the past 7 days. The problem occurs every few hours. The cough is productive of purulent sputum. Associated symptoms include chills, headaches, nasal congestion, postnasal drip, rhinorrhea, a sore throat and wheezing. Pertinent negatives include no chest pain, ear congestion, ear pain, fever, heartburn, hemoptysis, myalgias, rash, shortness of breath, sweats or weight loss. The symptoms are aggravated by lying down. She has tried OTC cough suppressant (mucinex and Delsym) for the symptoms. The treatment provided no relief. Her past medical history is significant for bronchitis and pneumonia.     Review of Systems   Constitutional: Positive for chills. Negative for fever and weight loss.   HENT: Positive for postnasal drip, rhinorrhea and sore throat. Negative for ear pain.    Respiratory: Positive for cough and wheezing. Negative for hemoptysis, chest tightness and shortness of breath.    Cardiovascular: Negative for chest pain.   Gastrointestinal: Negative for heartburn.   Musculoskeletal: Negative for myalgias.   Skin: Negative for rash.   Neurological: Positive for headaches.       Objective:      Physical Exam   Constitutional: She is oriented to person, place, and time. She appears well-developed and well-nourished.   HENT:   Head: Normocephalic and atraumatic.   Right Ear: Tympanic membrane, external ear and ear canal normal.   Left Ear: Tympanic membrane, external ear and ear canal normal.   Nose: Nose normal.   Mouth/Throat: Oropharynx is clear and moist. No oropharyngeal exudate.   Eyes: Conjunctivae and EOM are normal.   Neck: Neck  supple. No thyromegaly present.   Cardiovascular: Normal rate, regular rhythm and normal heart sounds.    Pulmonary/Chest: Effort normal and breath sounds normal. No respiratory distress. She has no wheezes.   Lymphadenopathy:     She has no cervical adenopathy.   Neurological: She is alert and oriented to person, place, and time.   Skin: Skin is warm and dry.   Psychiatric: She has a normal mood and affect. Her behavior is normal.         Assessment/Plan:     1. Cough in adult  albuterol 90 mcg/actuation inhaler    promethazine-codeine 6.25-10 mg/5 ml (PHENERGAN WITH CODEINE) 6.25-10 mg/5 mL syrup   2. Controlled type 2 diabetes mellitus without complication, without long-term current use of insulin  Ambulatory referral to Optometry    CANCELED: Ambulatory referral to Ophthalmology   she is due for labs, then visit next month for DM.

## 2017-07-27 ENCOUNTER — PATIENT MESSAGE (OUTPATIENT)
Dept: INTERNAL MEDICINE | Facility: CLINIC | Age: 67
End: 2017-07-27

## 2017-08-01 ENCOUNTER — CLINICAL SUPPORT (OUTPATIENT)
Dept: INTERNAL MEDICINE | Facility: CLINIC | Age: 67
End: 2017-08-01
Payer: MEDICARE

## 2017-08-01 ENCOUNTER — OFFICE VISIT (OUTPATIENT)
Dept: OPHTHALMOLOGY | Facility: CLINIC | Age: 67
End: 2017-08-01
Payer: MEDICARE

## 2017-08-01 ENCOUNTER — HOSPITAL ENCOUNTER (OUTPATIENT)
Dept: RADIOLOGY | Facility: HOSPITAL | Age: 67
Discharge: HOME | End: 2017-08-01
Attending: NURSE PRACTITIONER
Payer: MEDICARE

## 2017-08-01 ENCOUNTER — OFFICE VISIT (OUTPATIENT)
Dept: URGENT CARE | Facility: CLINIC | Age: 67
End: 2017-08-01
Payer: MEDICARE

## 2017-08-01 VITALS
HEIGHT: 63 IN | TEMPERATURE: 97 F | BODY MASS INDEX: 30.95 KG/M2 | WEIGHT: 174.69 LBS | DIASTOLIC BLOOD PRESSURE: 80 MMHG | SYSTOLIC BLOOD PRESSURE: 124 MMHG | OXYGEN SATURATION: 97 % | HEART RATE: 99 BPM

## 2017-08-01 DIAGNOSIS — E11.9 TYPE 2 DIABETES MELLITUS WITHOUT RETINOPATHY: Primary | ICD-10-CM

## 2017-08-01 DIAGNOSIS — J20.9 ACUTE BRONCHITIS, UNSPECIFIED ORGANISM: ICD-10-CM

## 2017-08-01 DIAGNOSIS — J01.01 ACUTE RECURRENT MAXILLARY SINUSITIS: ICD-10-CM

## 2017-08-01 DIAGNOSIS — H52.203 ASTIGMATISM WITH PRESBYOPIA, BILATERAL: ICD-10-CM

## 2017-08-01 DIAGNOSIS — E78.5 HYPERLIPIDEMIA ASSOCIATED WITH TYPE 2 DIABETES MELLITUS: ICD-10-CM

## 2017-08-01 DIAGNOSIS — E11.9 CONTROLLED TYPE 2 DIABETES MELLITUS WITHOUT COMPLICATION, WITHOUT LONG-TERM CURRENT USE OF INSULIN: ICD-10-CM

## 2017-08-01 DIAGNOSIS — E11.69 HYPERLIPIDEMIA ASSOCIATED WITH TYPE 2 DIABETES MELLITUS: ICD-10-CM

## 2017-08-01 DIAGNOSIS — R06.2 WHEEZING: Primary | ICD-10-CM

## 2017-08-01 DIAGNOSIS — Z13.5 GLAUCOMA SCREENING: ICD-10-CM

## 2017-08-01 DIAGNOSIS — H52.4 ASTIGMATISM WITH PRESBYOPIA, BILATERAL: ICD-10-CM

## 2017-08-01 DIAGNOSIS — I10 ESSENTIAL HYPERTENSION: ICD-10-CM

## 2017-08-01 DIAGNOSIS — E66.9 OBESITY (BMI 30.0-34.9): ICD-10-CM

## 2017-08-01 DIAGNOSIS — R06.2 WHEEZING: ICD-10-CM

## 2017-08-01 DIAGNOSIS — K90.9 IMPAIRED INTESTINAL ABSORPTION: ICD-10-CM

## 2017-08-01 LAB
ALBUMIN SERPL BCP-MCNC: 3.6 G/DL
ALP SERPL-CCNC: 137 U/L
ALT SERPL W/O P-5'-P-CCNC: 79 U/L
ANION GAP SERPL CALC-SCNC: 10 MMOL/L
AST SERPL-CCNC: 34 U/L
BASOPHILS # BLD AUTO: 0.03 K/UL
BASOPHILS NFR BLD: 0.4 %
BILIRUB SERPL-MCNC: 0.4 MG/DL
BUN SERPL-MCNC: 11 MG/DL
CALCIUM SERPL-MCNC: 9.9 MG/DL
CHLORIDE SERPL-SCNC: 105 MMOL/L
CHOLEST/HDLC SERPL: 4.2 {RATIO}
CO2 SERPL-SCNC: 24 MMOL/L
CREAT SERPL-MCNC: 0.8 MG/DL
CREAT UR-MCNC: 70 MG/DL
DIFFERENTIAL METHOD: ABNORMAL
EOSINOPHIL # BLD AUTO: 0.3 K/UL
EOSINOPHIL NFR BLD: 3.7 %
ERYTHROCYTE [DISTWIDTH] IN BLOOD BY AUTOMATED COUNT: 15.4 %
EST. GFR  (AFRICAN AMERICAN): >60 ML/MIN/1.73 M^2
EST. GFR  (NON AFRICAN AMERICAN): >60 ML/MIN/1.73 M^2
GLUCOSE SERPL-MCNC: 105 MG/DL
HCT VFR BLD AUTO: 40.1 %
HDL/CHOLESTEROL RATIO: 23.7 %
HDLC SERPL-MCNC: 169 MG/DL
HDLC SERPL-MCNC: 40 MG/DL
HGB BLD-MCNC: 13.9 G/DL
LDLC SERPL CALC-MCNC: 96 MG/DL
LYMPHOCYTES # BLD AUTO: 4.3 K/UL
LYMPHOCYTES NFR BLD: 51.1 %
MCH RBC QN AUTO: 25.3 PG
MCHC RBC AUTO-ENTMCNC: 34.7 G/DL
MCV RBC AUTO: 73 FL
MICROALBUMIN UR DL<=1MG/L-MCNC: 9 UG/ML
MICROALBUMIN/CREATININE RATIO: 12.9 UG/MG
MONOCYTES # BLD AUTO: 0.5 K/UL
MONOCYTES NFR BLD: 6.1 %
NEUTROPHILS # BLD AUTO: 3.2 K/UL
NEUTROPHILS NFR BLD: 38.6 %
NONHDLC SERPL-MCNC: 129 MG/DL
PLATELET # BLD AUTO: 293 K/UL
PMV BLD AUTO: 9.7 FL
POTASSIUM SERPL-SCNC: 4.1 MMOL/L
PROT SERPL-MCNC: 8.6 G/DL
RBC # BLD AUTO: 5.5 M/UL
SODIUM SERPL-SCNC: 139 MMOL/L
TRIGL SERPL-MCNC: 165 MG/DL
VIT B12 SERPL-MCNC: 1532 PG/ML
WBC # BLD AUTO: 8.35 K/UL

## 2017-08-01 PROCEDURE — 99999 PR PBB SHADOW E&M-EST. PATIENT-LVL V: CPT | Mod: PBBFAC,,, | Performed by: NURSE PRACTITIONER

## 2017-08-01 PROCEDURE — 99213 OFFICE O/P EST LOW 20 MIN: CPT | Mod: 25,S$GLB,, | Performed by: NURSE PRACTITIONER

## 2017-08-01 PROCEDURE — 99499 UNLISTED E&M SERVICE: CPT | Mod: S$GLB,,, | Performed by: NURSE PRACTITIONER

## 2017-08-01 PROCEDURE — 99999 PR PBB SHADOW E&M-EST. PATIENT-LVL II: CPT | Mod: PBBFAC,,, | Performed by: OPTOMETRIST

## 2017-08-01 PROCEDURE — 92004 COMPRE OPH EXAM NEW PT 1/>: CPT | Mod: S$GLB,,, | Performed by: OPTOMETRIST

## 2017-08-01 PROCEDURE — 71020 XR CHEST PA AND LATERAL: CPT | Mod: TC,PO

## 2017-08-01 PROCEDURE — 71020 XR CHEST PA AND LATERAL: CPT | Mod: 26,,, | Performed by: RADIOLOGY

## 2017-08-01 PROCEDURE — 92015 DETERMINE REFRACTIVE STATE: CPT | Mod: S$GLB,,, | Performed by: OPTOMETRIST

## 2017-08-01 PROCEDURE — 99499 UNLISTED E&M SERVICE: CPT | Mod: S$GLB,,, | Performed by: OPTOMETRIST

## 2017-08-01 PROCEDURE — 94640 AIRWAY INHALATION TREATMENT: CPT | Mod: S$GLB,,, | Performed by: NURSE PRACTITIONER

## 2017-08-01 PROCEDURE — 36415 COLL VENOUS BLD VENIPUNCTURE: CPT | Mod: S$GLB,,, | Performed by: FAMILY MEDICINE

## 2017-08-01 RX ORDER — PREDNISONE 20 MG/1
20 TABLET ORAL 2 TIMES DAILY
Qty: 6 TABLET | Refills: 0 | Status: SHIPPED | OUTPATIENT
Start: 2017-08-01 | End: 2017-08-04

## 2017-08-01 RX ORDER — ALBUTEROL SULFATE 90 UG/1
2 AEROSOL, METERED RESPIRATORY (INHALATION)
Qty: 1 INHALER | Refills: 1 | Status: SHIPPED | OUTPATIENT
Start: 2017-08-01 | End: 2017-10-04 | Stop reason: SDUPTHER

## 2017-08-01 RX ORDER — DOXYCYCLINE 100 MG/1
100 CAPSULE ORAL 2 TIMES DAILY
Qty: 14 CAPSULE | Refills: 0 | Status: SHIPPED | OUTPATIENT
Start: 2017-08-01 | End: 2017-08-08

## 2017-08-01 RX ORDER — BENZONATATE 100 MG/1
200 CAPSULE ORAL 3 TIMES DAILY PRN
Qty: 60 CAPSULE | Refills: 1 | Status: SHIPPED | OUTPATIENT
Start: 2017-08-01 | End: 2017-08-11

## 2017-08-01 RX ORDER — LEVALBUTEROL INHALATION SOLUTION 1.25 MG/3ML
1.25 SOLUTION RESPIRATORY (INHALATION)
Status: COMPLETED | OUTPATIENT
Start: 2017-08-01 | End: 2017-08-01

## 2017-08-01 RX ADMIN — LEVALBUTEROL INHALATION SOLUTION 1.25 MG: 1.25 SOLUTION RESPIRATORY (INHALATION) at 11:08

## 2017-08-01 NOTE — PROGRESS NOTES
HPI     Diabetic Eye Exam    Additional comments: bs 101 this morning           Blurred Vision    Additional comments: distance>near           Comments   Pt's last eye exam was about a year ago elsewhere. First time seeing [facility].   Wears progressive lenses, last updated about 2 yrs ago. States possible   change in va, distance>near.   1. PCIOL OU 5+ yrs ago       Last edited by Jami Sorenson on 8/1/2017 10:20 AM. (History)            Assessment /Plan     For exam results, see Encounter Report.    Type 2 diabetes mellitus without retinopathy    Glaucoma screening    Astigmatism with presbyopia, bilateral      No diabetic retinopathy both eyes.  Glaucoma screening negative both eyes.  Refractive error change right eye.  Updated glasses prescription.  Return to clinic 1 yr.

## 2017-08-01 NOTE — PATIENT INSTRUCTIONS
PLAN: CXR,   Advised increased p.o. Fluids  Advise monitor Blood sugar  Nebulizer with Xopenex 1.25 for 15 and clinic now x 1  Drink plenty of clear fluids--at least 64 ounces of water/juice & rest  Simply saline nasal wash or flonase to irrigate sinuses and for congestion/runny nose.  Cool mist humidifier/vaporizer.  Meds: Doxycycline, prednisone, albuterol inhaler & Tessalon Perles  Practice good handwashing..  Mucinex for cough and chest congestion.  Tylenol  for fever, headache and body aches.  Warm salt water gargles for throat comfort.  Chloraseptic spray or lozenges for throat comfort.  Advise follow up with PCP  Advise go to ER if symptoms worsen or fail to improve with treatment.

## 2017-08-01 NOTE — LETTER
August 1, 2017      Denia Maldonado MD  [address] [place] [address] [address] [place] [id number]           [facility]  [address] [facility] [place] [address]  Phone: [phone]  Fax: [phone]          Patient: Cassandra Campos   MR Number: [id number]   YOB: 1950   Date of Visit: 8/1/2017       Dear Dr. Deina Maldonado:    Thank you for referring Cassandra Campos to me for evaluation. Attached you will find relevant portions of my assessment and plan of care.    If you have questions, please do not hesitate to call me. I look forward to following Cassandra Campos along with you.    Sincerely,    Teetee Zimmerman, OD    Enclosure  CC:  No Recipients    If you would like to receive this communication electronically, please contact [email or web address] or [phone] to request more information on [employer] access.    For providers and/or their staff who would like to refer a patient to [facility], please contact us through our one-stop-shop provider referral line, [facility], at [phone].    If you feel you have received this communication in error or would no longer like to receive these types of communications, please e-mail [email or web address]

## 2017-08-01 NOTE — PROGRESS NOTES
Chief complaint/reason for visit: Nasal congestion, postnasal drip, cough and fever    HISTORY OF PRESENT ILLNESS:  68 y/o female complains of nasal congestion, postnasal drip, headache, shortness of breath, forceful productive cough with wheezing, urinating on self, chills & fatigue onset 3 weeks.  Patient complains cough with wheezing worse at night.  Admits seen & treated per Primary care with cough syrup. Patient requesting refill on cough syrup! Patient admits has  albuterol inhaler & requesting. Admits tried medication with no relief.      Past Medical History:   Diagnosis Date    Arthritis     hands    Borderline glaucoma     COPD (chronic obstructive pulmonary disease)     pt states she does not have copd    Diabetes mellitus, type 2     Gastritis     Gastritis     upper GI 2017    Hydradenitis     Hyperlipidemia     Hypertension     Insomnia     Migraines 2000    Morbid obesity due to excess calories 2016    Nasal septum perforation     Obesity     Pneumonia     Sleep apnea     SVT (supraventricular tachycardia) 2013    Type 2 diabetes mellitus     Type II or unspecified type diabetes mellitus without mention of complication, not stated as uncontrolled 2013       Past Surgical History:   Procedure Laterality Date    AXILLARY HIDRADENITIS EXCISION      BREAST LUMPECTOMY Bilateral 1998    Benign    CARPAL TUNNEL RELEASE      bilateral    CATARACT EXTRACTION      OU     SECTION  1979    CHOLECYSTECTOMY  2014    CYST REMOVAL  2015    sebaceous cyst removed from face    gastric sleeve  2017    Dr. Watson    KNEE SURGERY Right     TONSILLECTOMY, ADENOIDECTOMY  1980s    TRIGGER FINGER RELEASE Right 2015    Dr. Pedraza            Family History   Problem Relation Age of Onset    Prostate cancer Brother     Diabetes Maternal Aunt             Social History     Social History    Marital status:      Spouse name: N/A     Number of children: 1    Years of education: N/A     Occupational History     aid      Social History Main Topics    Smoking status: Former Smoker     Packs/day: 0.50     Years: 30.00     Types: Cigarettes     Start date: 1/1/2012    Smokeless tobacco: Never Used    Alcohol use Yes      Comment: rarely // wine  No alcohol prior to surgery    Drug use: No    Sexual activity: Not Currently     Partners: Male     Other Topics Concern    Not on file     Social History Narrative    Single, part-time teacher. Masters degree biology.        ROS:  GENERAL: Reports fatigue.   SKIN: No rashes, itching or changes in color or texture of skin.  HEENT: Reports nasal congestion, postnasal drip, headache, hoarseness.   NODES: No masses or lesions. Denies swollen glands.  CHEST: Reports productive cough. & wheezing  CARDIOVASCULAR: Denies chest pain, shortness of breath  ABDOMEN: Appetite fair, No weight loss.  MUSCULOSKELETAL: reports no back pain.  NEUROLOGIC: No history of seizures, paralysis, alteration of gait or coordination.  PSYCHIATRIC: Reilly mood swings, depression.    PE:   APPEARANCE: Well nourished, well developed, in moderate distress, pulse ox: 97%  SKIN: Normal skin turgor, no lesions.  HEENT: Turbinates red, Minimal red pharynx, TM's poor light reflex bilateral.  CHEST: minimal expiratory wheezing with rhonchi on auscultation.  CARDIOVASCULAR: Regular rate and rhythm.   MUSCULOSKELETAL: PATIÑO without difficulty  NEUROLOGIC: No sensory deficits. Gait & Posture: normal, No cerebellar signs.  MENTAL STATUS: Patient alert, oriented x 3 & conversant.    PLAN: CXR,   Advised increased p.o. Fluids  Advise monitor Blood sugar  Nebulizer with Xopenex 1.25 for 15 and clinic now x 1  Drink plenty of clear fluids--at least 64 ounces of water/juice & rest  Simply saline nasal wash or Flonase to irrigate sinuses and for congestion/runny nose.  Cool mist humidifier/vaporizer.  Meds: Doxycycline, prednisone,  albuterol inhaler & Tessalon Perles  Practice good handwashing..  Mucinex for cough and chest congestion.  Tylenol  for fever, headache and body aches.  Warm salt water gargles for throat comfort.  Chloraseptic spray or lozenges for throat comfort.  Advise follow up with PCP  Advise go to ER if symptoms worsen or fail to improve with treatment.      DIAGNOSIS:  Sinobronchitis   Hypertension  Morbid obesity  Hyperlipidemia associated with Diabetes

## 2017-08-02 LAB
ESTIMATED AVG GLUCOSE: 111 MG/DL
HBA1C MFR BLD HPLC: 5.5 %

## 2017-08-03 ENCOUNTER — PATIENT MESSAGE (OUTPATIENT)
Dept: INTERNAL MEDICINE | Facility: CLINIC | Age: 67
End: 2017-08-03

## 2017-08-08 ENCOUNTER — OFFICE VISIT (OUTPATIENT)
Dept: INTERNAL MEDICINE | Facility: CLINIC | Age: 67
End: 2017-08-08
Payer: MEDICARE

## 2017-08-08 VITALS
HEART RATE: 93 BPM | WEIGHT: 172.38 LBS | OXYGEN SATURATION: 96 % | BODY MASS INDEX: 30.54 KG/M2 | DIASTOLIC BLOOD PRESSURE: 71 MMHG | SYSTOLIC BLOOD PRESSURE: 104 MMHG | TEMPERATURE: 96 F

## 2017-08-08 DIAGNOSIS — H00.024 HORDEOLUM INTERNUM LEFT UPPER EYELID: Primary | ICD-10-CM

## 2017-08-08 DIAGNOSIS — E11.69 HYPERLIPIDEMIA ASSOCIATED WITH TYPE 2 DIABETES MELLITUS: ICD-10-CM

## 2017-08-08 DIAGNOSIS — R74.8 ABNORMAL LIVER ENZYMES: ICD-10-CM

## 2017-08-08 DIAGNOSIS — R05.9 COUGH IN ADULT: ICD-10-CM

## 2017-08-08 DIAGNOSIS — E11.9 CONTROLLED TYPE 2 DIABETES MELLITUS WITHOUT COMPLICATION, WITHOUT LONG-TERM CURRENT USE OF INSULIN: ICD-10-CM

## 2017-08-08 DIAGNOSIS — E78.5 HYPERLIPIDEMIA ASSOCIATED WITH TYPE 2 DIABETES MELLITUS: ICD-10-CM

## 2017-08-08 DIAGNOSIS — G47.00 INSOMNIA, UNSPECIFIED TYPE: ICD-10-CM

## 2017-08-08 PROCEDURE — 3078F DIAST BP <80 MM HG: CPT | Mod: S$GLB,,, | Performed by: FAMILY MEDICINE

## 2017-08-08 PROCEDURE — 99499 UNLISTED E&M SERVICE: CPT | Mod: S$GLB,,, | Performed by: FAMILY MEDICINE

## 2017-08-08 PROCEDURE — 99214 OFFICE O/P EST MOD 30 MIN: CPT | Mod: S$GLB,,, | Performed by: FAMILY MEDICINE

## 2017-08-08 PROCEDURE — 3044F HG A1C LEVEL LT 7.0%: CPT | Mod: S$GLB,,, | Performed by: FAMILY MEDICINE

## 2017-08-08 PROCEDURE — 84460 ALANINE AMINO (ALT) (SGPT): CPT

## 2017-08-08 PROCEDURE — 1159F MED LIST DOCD IN RCRD: CPT | Mod: S$GLB,,, | Performed by: FAMILY MEDICINE

## 2017-08-08 PROCEDURE — 84450 TRANSFERASE (AST) (SGOT): CPT

## 2017-08-08 PROCEDURE — 3008F BODY MASS INDEX DOCD: CPT | Mod: S$GLB,,, | Performed by: FAMILY MEDICINE

## 2017-08-08 PROCEDURE — 3074F SYST BP LT 130 MM HG: CPT | Mod: S$GLB,,, | Performed by: FAMILY MEDICINE

## 2017-08-08 PROCEDURE — 1126F AMNT PAIN NOTED NONE PRSNT: CPT | Mod: S$GLB,,, | Performed by: FAMILY MEDICINE

## 2017-08-08 PROCEDURE — 99999 PR PBB SHADOW E&M-EST. PATIENT-LVL III: CPT | Mod: PBBFAC,,, | Performed by: FAMILY MEDICINE

## 2017-08-08 NOTE — PROGRESS NOTES
Subjective:       Patient ID: Cassandra Campos is a 67 y.o. female.    Chief Complaint: f/u on blood work and right eye swollen    Here to review labs - excellent weight loss post gastric sleeve and now normal A1C with metformin 500 3 daily.  Excellent lipid levels. BP controlled.  Note ALT is sl up again - AST continues WNL. We are not sure of the etiology of her transaminitis. She is off her supplements due to the elevation - she also stopped the protein drinks she was given after surgery.  Walking 3 miles QOD. Takes an hour.    Lab Results       Component                Value               Date                       WBC                      8.35                08/01/2017                 HGB                      13.9                08/01/2017                 HCT                      40.1                08/01/2017                 PLT                      293                 08/01/2017                 CHOL                     169                 08/01/2017                 TRIG                     165 (H)             08/01/2017                 HDL                      40                  08/01/2017                 LDLCALC                  96.0                08/01/2017                 ALT                      79 (H)              08/01/2017                 AST                      34                  08/01/2017                 NA                       139                 08/01/2017                 K                        4.1                 08/01/2017                 CL                       105                 08/01/2017                 CALCIUM                  9.9                 08/01/2017                 CREATININE               0.8                 08/01/2017                 BUN                      11                  08/01/2017                 CO2                      24                  08/01/2017                 TSH                      2.386               11/28/2016                 GLU                       105                 08/01/2017                 ESTGFRAFRICA             >60.0               08/01/2017                 EGFRNONAA                >60.0               08/01/2017                 HGBA1C                   5.5                 08/01/2017                 MICALBCREAT              12.9                08/01/2017            Reviewed her liver enzymes: ALT now within normal limits and AST still elevated at 79.  Reviewed her supplements, all of which she has been holding, and recommendations given.      Diabetes   She presents for her follow-up diabetic visit. She has type 2 diabetes mellitus. No MedicAlert identification noted. The initial diagnosis of diabetes was made 2012 years ago. Pertinent negatives for hypoglycemia include no confusion, dizziness, headaches, hunger, mood changes, nervousness/anxiousness, pallor, seizures, sleepiness, speech difficulty, sweats or tremors. Associated symptoms include weight loss. Pertinent negatives for diabetes include no blurred vision, no chest pain, no fatigue, no foot paresthesias, no foot ulcerations, no polydipsia, no polyphagia, no polyuria, no visual change and no weakness. (She has lost total 42 lbs since her gastric sleeve 2/13/17) Pertinent negatives for hypoglycemia complications include no blackouts, no hospitalization, no nocturnal hypoglycemia, no required assistance and no required glucagon injection. Symptoms are stable. Pertinent negatives for diabetic complications include no autonomic neuropathy, CVA, heart disease, impotence, nephropathy, peripheral neuropathy, PVD or retinopathy. Risk factors for coronary artery disease include dyslipidemia, hypertension, obesity and diabetes mellitus. Current diabetic treatment includes oral agent (monotherapy). Her weight is decreasing steadily. She is following a generally healthy diet. Meal planning includes avoidance of concentrated sweets. She has had a previous visit with a dietitian. She participates in  exercise every other day. She monitors blood glucose at home 1-2 x per day. Blood glucose monitoring compliance is excellent. Her home blood glucose trend is decreasing steadily. Her breakfast blood glucose range is generally 110-130 mg/dl. An ACE inhibitor/angiotensin II receptor blocker is being taken. She sees a podiatrist.Eye exam is current.     Review of Systems   Constitutional: Positive for weight loss. Negative for fatigue.   Eyes: Negative for blurred vision.   Cardiovascular: Negative for chest pain.   Endocrine: Negative for polydipsia, polyphagia and polyuria.   Genitourinary: Negative for impotence.   Skin: Negative for pallor.   Neurological: Negative for dizziness, tremors, seizures, speech difficulty, weakness and headaches.   Psychiatric/Behavioral: Negative for confusion. The patient is not nervous/anxious.        Objective:      Physical Exam   Constitutional: She is oriented to person, place, and time. She appears well-developed.   HENT:   Head: Normocephalic and atraumatic.   Eyes:   Mild erythematous papule to right upper lid with no external pore   Cardiovascular: Normal rate, regular rhythm and normal heart sounds.    Pulmonary/Chest: Effort normal and breath sounds normal.   Neurological: She is alert and oriented to person, place, and time.   Skin: Skin is warm and dry.   Psychiatric: She has a normal mood and affect. Her behavior is normal.         Assessment/Plan:     1. Hordeolum internum left upper eyelid     2. Controlled type 2 diabetes mellitus without complication, without long-term current use of insulin     3. Cough in adult     4. Abnormal liver enzymes  ALT (SGPT)    AST (SGOT)    ALT (SGPT)    AST (SGOT)   5. Hyperlipidemia associated with type 2 diabetes mellitus     6. Insomnia, unspecified type     Recommend she use a natural tears type eyedrop and warm compresses.  Information given.  Pt instructions:  Restart protein drinks.  One multivitamin (centrum silver) daily with a  meal/food.  OK to restart calcium citrate taken 3 times daily with food (945 mg).  Continue to hold the B-complex and the B12 for now.    You may decrease metformin to 2 with PM meal.  Use the albuterol inhaler 2 puffs 3 x day: morning, midday and evening.    We will recheck her liver enzymes in 4 weeks and if within normal limits, we can recheck 6 months/February with labs.

## 2017-08-10 ENCOUNTER — OFFICE VISIT (OUTPATIENT)
Dept: ORTHOPEDICS | Facility: CLINIC | Age: 67
End: 2017-08-10
Payer: MEDICARE

## 2017-08-10 VITALS
RESPIRATION RATE: 12 BRPM | BODY MASS INDEX: 30.86 KG/M2 | DIASTOLIC BLOOD PRESSURE: 73 MMHG | HEART RATE: 82 BPM | SYSTOLIC BLOOD PRESSURE: 109 MMHG | WEIGHT: 174.19 LBS | HEIGHT: 63 IN

## 2017-08-10 DIAGNOSIS — Z98.890 POSTOPERATIVE STATE: Primary | ICD-10-CM

## 2017-08-10 PROCEDURE — 99024 POSTOP FOLLOW-UP VISIT: CPT | Mod: S$GLB,,, | Performed by: ORTHOPAEDIC SURGERY

## 2017-08-10 PROCEDURE — 99999 PR PBB SHADOW E&M-EST. PATIENT-LVL III: CPT | Mod: PBBFAC,,, | Performed by: ORTHOPAEDIC SURGERY

## 2017-08-10 RX ORDER — NABUMETONE 500 MG/1
500 TABLET, FILM COATED ORAL DAILY
Qty: 30 TABLET | Refills: 2 | Status: SHIPPED | OUTPATIENT
Start: 2017-08-10 | End: 2017-11-06

## 2017-08-10 NOTE — PROGRESS NOTES
SUBJECTIVE:  Patient is status post Right Knee arthroscope and middle finger trigger finger release.  Patient complains of 0/ 10 pain..     Past Medical History:   Diagnosis Date    Arthritis     hands    Borderline glaucoma     COPD (chronic obstructive pulmonary disease)     pt states she does not have copd    Diabetes mellitus, type 2     Gastritis     Gastritis     upper GI 2017    Hydradenitis     Hyperlipidemia     Hypertension     Insomnia     Migraines 2000    Morbid obesity due to excess calories 2016    Nasal septum perforation     Obesity     Pneumonia     Sleep apnea     SVT (supraventricular tachycardia) 2013    Type 2 diabetes mellitus     Type II or unspecified type diabetes mellitus without mention of complication, not stated as uncontrolled 2013     Past Surgical History:   Procedure Laterality Date    AXILLARY HIDRADENITIS EXCISION      BREAST LUMPECTOMY Bilateral 1998    Benign    CARPAL TUNNEL RELEASE      bilateral    CATARACT EXTRACTION Bilateral     OU     SECTION  1979    CHOLECYSTECTOMY  2014    CYST REMOVAL  2015    sebaceous cyst removed from face    gastric sleeve  2017    Dr. Watson    KNEE SURGERY Right     TONSILLECTOMY, ADENOIDECTOMY  1980s    TRIGGER FINGER RELEASE Right 2015    Dr. Pedraza     Review of patient's allergies indicates:  No Known Allergies  Current Outpatient Prescriptions on File Prior to Visit   Medication Sig Dispense Refill    ACCU-CHEK FASTCLIX Misc USE ONE TIME DAILY 100 each 3    ACCU-CHEK SMARTVIEW TEST STRIP Strp USE TO TEST ONCE DAILY 100 strip 3    albuterol 90 mcg/actuation inhaler Inhale 2 puffs into the lungs every 4 to 6 hours as needed for Wheezing. 1 Inhaler 1    amitriptyline (ELAVIL) 100 MG tablet TAKE 1 TABLET NIGHTLY 90 tablet 3    b complex vitamins tablet Take 1 tablet by mouth every evening.       benzonatate (TESSALON) 100 MG capsule Take 2 capsules (200 mg  "total) by mouth 3 (three) times daily as needed. 60 capsule 1    blood-glucose meter kit Use as instructed 1 each 0    calcium citrate (CALCITRATE) 200 mg (950 mg) tablet Take 1 tablet by mouth once daily. Ca 630 mg/Vit D 500IU - Pt takes 1 tab three times daily      cyanocobalamin, vitamin B-12, (VITAMIN B-12) 50 mcg tablet Take 50 mcg by mouth once daily.      eszopiclone 3 mg Tab TAKE 1 TABLET EVERY NIGHT AS NEEDED 90 tablet 0    ferrous sulfate 325 (65 FE) MG EC tablet Take 18 mg by mouth once daily.       metformin (GLUCOPHAGE-XR) 500 MG 24 hr tablet TAKE 3 TABLETS ONE TIME DAILY 270 tablet 0    metoprolol succinate (TOPROL-XL) 50 MG 24 hr tablet Take 1 tablet (50 mg total) by mouth once daily. (Patient taking differently: Take 50 mg by mouth every evening. ) 90 tablet 1    multivitamin (ONE DAILY MULTIVITAMIN) per tablet Take 1 tablet by mouth 2 (two) times daily.       omeprazole (PRILOSEC) 20 MG capsule Take 1 capsule (20 mg total) by mouth once daily. (Patient taking differently: Take 20 mg by mouth every evening. ) 90 capsule 1    pravastatin (PRAVACHOL) 40 MG tablet TAKE 1 TABLET ONE TIME DAILY (Patient taking differently: Take 40 mg by mouth every evening. TAKE 1 TABLET ONE TIME DAILY) 30 tablet 0     No current facility-administered medications on file prior to visit.      /73   Pulse 82   Resp 12   Ht 5' 3" (1.6 m)   Wt 79 kg (174 lb 2.6 oz)   BMI 30.85 kg/m²    ROS:  GENERAL: No fever, chills, fatigability or weight loss.  SKIN: No rashes, itching or changes in color or texture of skin.  HEAD: No headaches or recent head trauma.  EYES: Visual acuity fine. No photophobia, ocular pain or diplopia.  EARS: Denies ear pain, discharge or vertigo.  NOSE: No loss of smell, no epistaxis or postnasal drip.  MOUTH & THROAT: No hoarseness or change in voice. No excessive gum bleeding.  NODES: Denies swollen glands.  CHEST: Denies BOGGS, cyanosis, wheezing, cough and sputum " production.  CARDIOVASCULAR: Denies chest pain, PND, orthopnea or reduced exercise tolerance.  ABDOMEN: Appetite fine. No weight loss. Denies diarrhea, abdominal pain, hematemesis or blood in stool.  URINARY: No flank pain, dysuria or hematuria.  PERIPHERAL VASCULAR: No claudication or cyanosis.  NEUROLOGIC: No history of seizures, paralysis, alteration of gait or coordination.  MUSCULOSKELETAL: See HPI    PE:  APPEARANCE: Well nourished, well developed, in no acute distress.   HEAD: Normocephalic, atraumatic.  EYES: PERRL. EOMI.   EARS: TM's intact. Light reflex normal. No retraction or perforation.   NOSE: Mucosa pink. Airway clear.  MOUTH & THROAT: No tonsillar enlargement. No pharyngeal erythema or exudate. No stridor.  NECK: Supple.   NODES: No cervical, axillary or inguinal lymph node enlargement.  CHEST: Lungs clear to auscultation.  CARDIOVASCULAR: Normal S1, S2. No rubs, murmurs or gallops.  ABDOMEN: Bowel sounds normal. Not distended. Soft. No tenderness or masses.  NEUROLOGIC: Cranial Nerves: II-XII grossly intact, also see MUSCULOSKELETAL  MUSCULOSKELETAL:          Right hand- 2 plus radial  artery and ulnar artery pulses, light touch intact Right upper extremity.  All digits are warm. No erythema, no warmth, no drainage, No swelling, no significant tenderness.  Good range of motion of the middle finger without triggering.              Right Knee  - 2 plus dorsalis pedis and posterior tibial artery pulses, light touch intact Right lower extremity.  All digits are warm. No erythema, no warmth, no drainage, minimal swelling, no significant tenderness.  Less than 2 seconds capillary refill all digits.      ASSESSMENT:    The patient is stable and improving.      PLAN:  Follow-up in 24 weeks.  Continue pain medication  Continue physical therapy

## 2017-08-10 NOTE — PATIENT INSTRUCTIONS
What is Arthritis?  Arthritis is a disease that affects the joints (the parts where bones meet and move). It can affect any joint in your body. There are many types of arthritis, including osteoarthritis and rheumatoid arthrtitis. If your symptoms are mild, medications may be enough to reduce pain and swelling. For more severe arthritis, surgery may be needed to improve the condition of the joint or replace the joint entirely.                  What causes arthritis?  Cartilage is a smooth substance that protects the ends of your bones and provides cushioning. When you have arthritis, this cartilage breaks down and can no longer protect your bones. The bones rub against each other, causing pain and swelling. Over time, bone spurs (small pieces of rough or splintered bone) may develop, and the joint's range of motion can become limited.  Symptoms  Some of the more common symptoms of arthritis include:  · Joint pain and stiffness. Pain and stiffness get worse with long periods of rest or using a joint too long or too hard.  · Joints that have lost normal shape and motion.  · Tender, inflamed joints. They may look red and feel warm.  · Grinding or popping noise with joint movement.   · Feeling tired all the time.  Reducing symptoms  Following a healthy lifestyle by losing weight and exercising can help reduce symptoms of osteoarthritis. Medicines can be very helpful for arthritis.     Date Last Reviewed: 9/10/2015  © 2290-9222 [email or web address]. [address] [place] [address], [place] [address]. All rights reserved. This information is not intended as a substitute for professional medical care. Always follow your healthcare professional's instructions.

## 2017-08-22 ENCOUNTER — PATIENT MESSAGE (OUTPATIENT)
Dept: INTERNAL MEDICINE | Facility: CLINIC | Age: 67
End: 2017-08-22

## 2017-08-22 ENCOUNTER — OFFICE VISIT (OUTPATIENT)
Dept: INTERNAL MEDICINE | Facility: CLINIC | Age: 67
End: 2017-08-22
Payer: MEDICARE

## 2017-08-22 VITALS
OXYGEN SATURATION: 95 % | WEIGHT: 180.75 LBS | BODY MASS INDEX: 32.02 KG/M2 | SYSTOLIC BLOOD PRESSURE: 119 MMHG | TEMPERATURE: 98 F | HEART RATE: 88 BPM | DIASTOLIC BLOOD PRESSURE: 84 MMHG

## 2017-08-22 DIAGNOSIS — R05.3 PERSISTENT COUGH: Primary | ICD-10-CM

## 2017-08-22 DIAGNOSIS — R06.2 EXPIRATORY WHEEZING: ICD-10-CM

## 2017-08-22 DIAGNOSIS — H00.024 HORDEOLUM INTERNUM LEFT UPPER EYELID: ICD-10-CM

## 2017-08-22 PROCEDURE — 3079F DIAST BP 80-89 MM HG: CPT | Mod: S$GLB,,, | Performed by: FAMILY MEDICINE

## 2017-08-22 PROCEDURE — 99213 OFFICE O/P EST LOW 20 MIN: CPT | Mod: S$GLB,,, | Performed by: FAMILY MEDICINE

## 2017-08-22 PROCEDURE — 1126F AMNT PAIN NOTED NONE PRSNT: CPT | Mod: S$GLB,,, | Performed by: FAMILY MEDICINE

## 2017-08-22 PROCEDURE — 3074F SYST BP LT 130 MM HG: CPT | Mod: S$GLB,,, | Performed by: FAMILY MEDICINE

## 2017-08-22 PROCEDURE — 3008F BODY MASS INDEX DOCD: CPT | Mod: S$GLB,,, | Performed by: FAMILY MEDICINE

## 2017-08-22 PROCEDURE — 1159F MED LIST DOCD IN RCRD: CPT | Mod: S$GLB,,, | Performed by: FAMILY MEDICINE

## 2017-08-22 PROCEDURE — 99999 PR PBB SHADOW E&M-EST. PATIENT-LVL IV: CPT | Mod: PBBFAC,,, | Performed by: FAMILY MEDICINE

## 2017-08-22 RX ORDER — PROMETHAZINE HYDROCHLORIDE AND DEXTROMETHORPHAN HYDROBROMIDE 6.25; 15 MG/5ML; MG/5ML
5 SYRUP ORAL
Qty: 180 ML | Refills: 0 | Status: SHIPPED | OUTPATIENT
Start: 2017-08-22 | End: 2017-08-23 | Stop reason: RX

## 2017-08-22 RX ORDER — AZITHROMYCIN 250 MG/1
TABLET, FILM COATED ORAL
Qty: 6 TABLET | Refills: 0 | Status: SHIPPED | OUTPATIENT
Start: 2017-08-22 | End: 2017-09-11 | Stop reason: ALTCHOICE

## 2017-08-22 RX ORDER — PREDNISONE 20 MG/1
40 TABLET ORAL DAILY
Qty: 10 TABLET | Refills: 0 | Status: SHIPPED | OUTPATIENT
Start: 2017-08-22 | End: 2017-08-27

## 2017-08-22 NOTE — PROGRESS NOTES
Subjective:       Patient ID: Cassandra Campos is a 67 y.o. female.    Chief Complaint: stye on eye; Cough; mucus; Sore Throat; and chest hurts    She is c/o persistent cough for a month. I saw her 7/27 and she had been coughing a week at that time. Then seen by urgent care 8/1/17 and given 1 week doxy, albuterol, 3 days prednisone 20 BID. Still coughing a week later when I saw her and I asked her to take the albuterol TID. She states she takes it BID now. Has finished all other meds. She thinks the inhaler makes her cough more.  Reviewed the chest x-ray results with the patient.    She still has swelling to right upper eyelid - sts she has done some compresses but not for long and not daily.      Cough   This is a recurrent problem. The current episode started 1 to 4 weeks ago. The problem has been rapidly worsening. The problem occurs every few minutes. The cough is non-productive. Associated symptoms include chest pain (substernal), headaches, nasal congestion, a sore throat (just with coughing), shortness of breath (just with coughing - no problem when on treadmill ), weight loss and wheezing. Pertinent negatives include no chills, ear congestion, ear pain, fever, heartburn, hemoptysis, postnasal drip, rash, rhinorrhea or sweats. The symptoms are aggravated by lying down. She has tried rest, oral steroids, a beta-agonist inhaler, prescription cough suppressant and OTC cough suppressant for the symptoms. The treatment provided no relief. Her past medical history is significant for bronchitis and pneumonia. There is no history of asthma, bronchiectasis, COPD, emphysema or environmental allergies.     Review of Systems   Constitutional: Positive for weight loss. Negative for chills and fever.   HENT: Positive for sore throat (just with coughing). Negative for ear pain, postnasal drip and rhinorrhea.    Respiratory: Positive for cough, shortness of breath (just with coughing - no problem when on treadmill ) and  wheezing. Negative for hemoptysis.    Cardiovascular: Positive for chest pain (substernal).   Gastrointestinal: Negative for heartburn.   Skin: Negative for rash.   Allergic/Immunologic: Negative for environmental allergies.   Neurological: Positive for headaches.       Objective:      Physical Exam   Constitutional: She is oriented to person, place, and time. She appears well-developed and well-nourished.   HENT:   Head: Normocephalic and atraumatic.   Right Ear: Tympanic membrane, external ear and ear canal normal.   Left Ear: Tympanic membrane, external ear and ear canal normal.   Nose: Nose normal.   Mouth/Throat: Oropharynx is clear and moist. No oropharyngeal exudate.   Eyes: Conjunctivae and EOM are normal.   Neck: Neck supple. No thyromegaly present.   Cardiovascular: Normal rate, regular rhythm and normal heart sounds.    Pulmonary/Chest: Effort normal. She has wheezes (faint coarse end exp basilar wheezes R>L ).   Lymphadenopathy:     She has no cervical adenopathy.   Neurological: She is alert and oriented to person, place, and time.   Skin: Skin is warm and dry.   Psychiatric: She has a normal mood and affect. Her behavior is normal.         Assessment/Plan:     1. Persistent cough  azithromycin (ZITHROMAX Z-EULALIA) 250 MG tablet    Ambulatory referral to Pulmonology    DISCONTINUED: promethazine-dextromethorphan (PROMETHAZINE-DM) 6.25-15 mg/5 mL Syrp   2. Hordeolum internum left upper eyelid  Ambulatory referral to Ophthalmology   3. Expiratory wheezing  predniSONE (DELTASONE) 20 MG tablet    Ambulatory referral to Pulmonology     Try another course this time of Z-Eulalia and oral steroids as well as symptomatic cough medication which patient is requesting.  Referral to pulmonology for further evaluation.

## 2017-08-23 RX ORDER — PROMETHAZINE HYDROCHLORIDE 6.25 MG/5ML
SYRUP ORAL
Qty: 1 BOTTLE | Refills: 0 | Status: SHIPPED | OUTPATIENT
Start: 2017-08-23 | End: 2017-09-11

## 2017-08-31 ENCOUNTER — TELEPHONE (OUTPATIENT)
Dept: INTERNAL MEDICINE | Facility: CLINIC | Age: 67
End: 2017-08-31

## 2017-08-31 ENCOUNTER — PATIENT MESSAGE (OUTPATIENT)
Dept: INTERNAL MEDICINE | Facility: CLINIC | Age: 67
End: 2017-08-31

## 2017-08-31 NOTE — TELEPHONE ENCOUNTER
Pt was calling in regards to scheduling an appointment for her lab work and her annual appointment. Scheduled her for her appointment. Pt verbalized understanding of the information given.

## 2017-08-31 NOTE — TELEPHONE ENCOUNTER
----- Message from Rosibel Lorenz sent at 8/31/2017 11:41 AM CDT -----  Contact: pt  The pt wants to be worked in on 9/5 for her annual appt, pt can be reached at [phone]///[id number]

## 2017-09-04 ENCOUNTER — PATIENT MESSAGE (OUTPATIENT)
Dept: INTERNAL MEDICINE | Facility: CLINIC | Age: 67
End: 2017-09-04

## 2017-09-05 ENCOUNTER — PATIENT MESSAGE (OUTPATIENT)
Dept: INTERNAL MEDICINE | Facility: CLINIC | Age: 67
End: 2017-09-05

## 2017-09-05 DIAGNOSIS — G47.00 INSOMNIA, UNSPECIFIED TYPE: ICD-10-CM

## 2017-09-05 RX ORDER — PRAVASTATIN SODIUM 40 MG/1
TABLET ORAL
Qty: 90 TABLET | Refills: 1 | Status: CANCELLED | OUTPATIENT
Start: 2017-09-05

## 2017-09-05 RX ORDER — PRAVASTATIN SODIUM 40 MG/1
TABLET ORAL
Qty: 90 TABLET | Refills: 1 | Status: SHIPPED | OUTPATIENT
Start: 2017-09-05 | End: 2018-01-29 | Stop reason: SDUPTHER

## 2017-09-05 RX ORDER — ESZOPICLONE 3 MG/1
TABLET, FILM COATED ORAL
Qty: 90 TABLET | Refills: 0 | Status: CANCELLED | OUTPATIENT
Start: 2017-09-05

## 2017-09-05 RX ORDER — ESZOPICLONE 3 MG/1
TABLET, FILM COATED ORAL
Qty: 90 TABLET | Refills: 0 | OUTPATIENT
Start: 2017-09-05

## 2017-09-05 NOTE — TELEPHONE ENCOUNTER
----- Message from Rosemary Alberto sent at 9/5/2017  4:15 PM CDT -----  Contact: Patient  Patient called and stated she sent a request today for refills but made a mistake. She requested that they be sent to [facility] but they need to go to [facility].    [facility] - [place], [place] - [address]  [address] [address]  Phone: [phone] Fax: [phone]    She can be contacted at [phone].    Thanks,  Rosemary

## 2017-09-05 NOTE — TELEPHONE ENCOUNTER
Spoke with pt,pt states that she was wanting her Rx to go to [facility] not the [facility]. Also advised the pt that it was to early to refill her eszopiclone. Pt states that it is not to early and wants to know when will she be able to refill her medication??    Please Advise

## 2017-09-05 NOTE — TELEPHONE ENCOUNTER
Pt is requesting a refill on her medications. eszopiclone 3 mg and Pravastatin 40 mg. Pt is requesting that these Rx be sent through to the [facility].

## 2017-09-05 NOTE — TELEPHONE ENCOUNTER
I'm refusing the eszopiclone Rx because it was sent 7/3/17 for #90 to [facility] mail order. It is too early to refill it.    The pravastatin was sent this AM by Dr Zhang to [facility]. I can send it to [facility] but she will have to contact [facility] to cancel the rx sent earlier today.    Let me know what she wants re pravastatin refill - and [facility]

## 2017-09-11 ENCOUNTER — OFFICE VISIT (OUTPATIENT)
Dept: INTERNAL MEDICINE | Facility: CLINIC | Age: 67
End: 2017-09-11
Payer: MEDICARE

## 2017-09-11 VITALS
WEIGHT: 184.5 LBS | BODY MASS INDEX: 32.69 KG/M2 | HEART RATE: 98 BPM | TEMPERATURE: 96 F | SYSTOLIC BLOOD PRESSURE: 108 MMHG | DIASTOLIC BLOOD PRESSURE: 74 MMHG | OXYGEN SATURATION: 97 %

## 2017-09-11 DIAGNOSIS — Z00.00 WELLNESS EXAMINATION: Primary | ICD-10-CM

## 2017-09-11 DIAGNOSIS — E11.9 CONTROLLED TYPE 2 DIABETES MELLITUS WITHOUT COMPLICATION, WITHOUT LONG-TERM CURRENT USE OF INSULIN: ICD-10-CM

## 2017-09-11 DIAGNOSIS — Z12.31 ENCOUNTER FOR SCREENING MAMMOGRAM FOR BREAST CANCER: ICD-10-CM

## 2017-09-11 DIAGNOSIS — R74.8 ABNORMAL LIVER ENZYMES: ICD-10-CM

## 2017-09-11 LAB
ALT SERPL W/O P-5'-P-CCNC: 112 U/L
AST SERPL-CCNC: 55 U/L

## 2017-09-11 PROCEDURE — 99499 UNLISTED E&M SERVICE: CPT | Mod: S$GLB,,, | Performed by: FAMILY MEDICINE

## 2017-09-11 PROCEDURE — 99999 PR PBB SHADOW E&M-EST. PATIENT-LVL III: CPT | Mod: PBBFAC,,, | Performed by: FAMILY MEDICINE

## 2017-09-11 PROCEDURE — 99397 PER PM REEVAL EST PAT 65+ YR: CPT | Mod: S$GLB,,, | Performed by: FAMILY MEDICINE

## 2017-09-11 NOTE — PROGRESS NOTES
Subjective:       Patient ID: Cassandra Campos is a 67 y.o. female.    Chief Complaint: Annual Exam; pap testing; and lab work    Requesting annual exam.  Also due for recheck on liver enzymes. Reviewed recommendations for PAP testing. No hx abnormal PAPs. Reviewed last PAP 2014 - no endocervical component present.  Patient agreeable to cessation of that testing at this time.    She has been off the iron, B12, multi-B since 8/8/17.  She is taking her calcium TID. We need to check her bottle again: two of her calcium tablets give 635 mg.  She is eating yogurt frequently often twice daily.    ANNUAL EXAM:  Preventative Health Checklist 65+   Cassandra Campos presents today for an annual exam.    Influenza Vaccine due on 08/01/2017  Mammogram due on 11/02/2017    Health Maintenance Summary                Influenza Vaccine Overdue 8/1/2017      Done 10/17/2016      Done 10/17/2016 Imm Admin: Influenza - High Dose     Done 10/20/2015 Imm Admin: Influenza - High Dose     Done 10/17/2014 Imm Admin: Influenza - High Dose     Done 8/21/2012      Patient has more history with this topic...    Mammogram Next Due 11/2/2017      Done 11/2/2016 MAMMO DIGITAL SCREENING BILAT WITH CAD     Done 9/11/2015 MAMMO DIGITAL SCREENING BILAT WITH CAD     Done 9/10/2015 MAMMO PREVIOUS     Done 9/10/2015 MAMMO PREVIOUS     Done 9/10/2015 MAMMO PREVIOUS     Patient has more history with this topic...    Hemoglobin A1c Next Due 2/1/2018      Done 8/1/2017 Hemoglobin A1C     Done 6/1/2017 [employer]: WORKFLOW - HEALTHY PLANET - EXTERNAL DATA -   EXTERNAL LAB DATE - HEMOGLOBIN A1C     Done 2/13/2017 Hemoglobin A1C (A)     Done 1/18/2017 [employer]: WORKFLOW - HEALTHY PLANET - EXTERNAL DATA -   EXTERNAL LAB DATE - HEMOGLOBIN A1C     Done 12/29/2016 [employer]: WORKFLOW - HEALTHY PLANET - EXTERNAL DATA -   EXTERNAL LAB DATE - HEMOGLOBIN A1C     Patient has more history with this topic...    Foot Exam Next Due 5/9/2018      Done 5/9/2017      Done  5/16/2016      Done 5/16/2016 [employer]: WORKFLOW - DIABETES - DIABETIC FOOT EXAM   PERFORMED     Done 6/18/2015 per Dr. Deanna Mchugh     Done 6/8/2015      Patient has more history with this topic...    Pneumococcal (65+) Next Due 7/26/2018      Done 6/22/2016 Imm Admin: Pneumococcal Conjugate - 13 Valent     Done 7/26/2013 Imm Admin: Pneumococcal Polysaccharide - 23 Valent    Lipid Panel Next Due 8/1/2018      Done 8/1/2017 LIPID PANEL (A)     Done 11/28/2016 LIPID PANEL (A)     Done 12/22/2015 LIPID PANEL (A)     Done 6/4/2015 LIPID PANEL (A)     Done 1/20/2015 LIPID PANEL (A)     Patient has more history with this topic...    Eye Exam Next Due 8/1/2018      Done 8/1/2017      Done 8/1/2017 [id number] EYE EXAM, NEW PATIENT,COMPREHESV     Done 1/18/2017 [employer]: WORKFLOW - HEALTHY PLANET - EXTERNAL DATA -   EXTERNAL PROCEDURES DATE - EYE EXAM     Done 12/29/2016 [employer]: WORKFLOW - HEALTHY PLANET - EXTERNAL DATA -   EXTERNAL PROCEDURES DATE - EYE EXAM     Done 12/16/2016 [employer]: WORKFLOW - HEALTHY PLANET - EXTERNAL DATA -   EXTERNAL PROCEDURES DATE - EYE EXAM     Patient has more history with this topic...    Urine Microalbumin Next Due 8/1/2018      Done 8/1/2017 Microalb Creat Ratio     Done 1/18/2017 [employer]: WORKFLOW - HEALTHY PLANET - EXTERNAL DATA -   EXTERNAL LAB DATE - URINE MICROALBUMIN     Done 12/29/2016 [employer]: WORKFLOW - HEALTHY PLANET - EXTERNAL DATA -   EXTERNAL LAB DATE - URINE MICROALBUMIN     Done 12/16/2016 [employer]: WORKFLOW - HEALTHY PLANET - EXTERNAL DATA -   EXTERNAL LAB DATE - URINE MICROALBUMIN     Done 6/15/2016 Microalb Creat Ratio     Patient has more history with this topic...    Colonoscopy Next Due 9/23/2020      Done 9/23/2010 Done at [facility] by Dr Parker    DEXA SCAN Next Due 11/20/2020      Done 11/20/2015 BONE DENSITY REPORT     Done 11/20/2015 DEXA BONE DENSITY SPINE HIP    TETANUS VACCINE Next Due 10/14/2026      Done 10/14/2016      Done 10/14/2016 Imm Admin:  Tdap     Done 11/9/2007 Imm Admin: Tdap     Done 11/9/2007     Zoster Vaccine Completed      Done 8/21/2012 Imm Admin: Zoster    Hepatitis C Screening Completed      Done 6/12/2017 HEPATITIS C ANTIBODY     Done 6/12/2017 HEPATITIS PANEL, ACUTE     Done 9/2/2014 HEPATITIS C ANTIBODY     Done 12/19/2006         Counseling:  Nutrition: Encouraged to take 5-10 servings fruits and vegetables daily   Exercise:  Physical activity recommendations reviewed and given hand out  Avoid Tobacco Use: reviewed  Avoid ETOH/Drug Use:  reviewed  STD Prevention/Abstinence:   Avoid High Risk Behavior:   Unintended Pregnancy:    Lap-shoulder Belts:  Yes  No text/talk while driving: Reviewed  Bike/ATV Motorcycle Helmets:  N/A  Smoke Detector:  yes  Safe Storage/Removal of Firearms: reviewed    Calcium Intake (females):  Calcium recommendations given with handout  Regular Dental Visits:  yes  Floss, Brush and Fluoride: yes  Fall risks: Reviewed  HCPOA/LW: Reviewed  Hot Water Heater Temp: Reviewed  CPR Family Members: Reviewed    She has completed a full 14 system review. All items are negative except as indicated.      Review of Systems   Constitutional: Negative for activity change and unexpected weight change.   HENT: Negative for hearing loss, rhinorrhea and trouble swallowing.    Eyes: Negative for discharge and visual disturbance.   Respiratory: Negative for cough (resolved), chest tightness and wheezing.    Cardiovascular: Negative for chest pain and palpitations.   Gastrointestinal: Positive for constipation. Negative for blood in stool, diarrhea and vomiting.   Endocrine: Negative for polydipsia and polyuria.   Genitourinary: Positive for enuresis (infrequent). Negative for difficulty urinating, dysuria, hematuria and menstrual problem.   Musculoskeletal: Negative for arthralgias, joint swelling and neck pain.   Neurological: Negative for weakness and headaches.   Psychiatric/Behavioral: Negative for confusion and dysphoric mood.        Objective:      Physical Exam   Constitutional: She is oriented to person, place, and time. She appears well-developed and well-nourished.   HENT:   Head: Normocephalic and atraumatic.   Right Ear: Tympanic membrane, external ear and ear canal normal.   Left Ear: Tympanic membrane, external ear and ear canal normal.   Nose: Nose normal.   Mouth/Throat: Oropharynx is clear and moist. No oropharyngeal exudate.   Eyes: Conjunctivae and EOM are normal.   Neck: Normal range of motion. Neck supple. No thyromegaly present.   Cardiovascular: Normal rate, regular rhythm and normal heart sounds.  Exam reveals no gallop and no friction rub.    No murmur heard.  Pulmonary/Chest: Effort normal and breath sounds normal. She exhibits no tenderness.   Abdominal: Soft. She exhibits no distension. There is no tenderness.   Musculoskeletal: She exhibits no edema.   Lymphadenopathy:     She has no cervical adenopathy.   Neurological: She is alert and oriented to person, place, and time.   Skin: Skin is warm and dry.   Psychiatric: She has a normal mood and affect. Her behavior is normal.         Assessment/Plan:     1. Wellness examination     2. Encounter for screening mammogram for breast cancer  Mammo Digital Screening Bilat with CAD   3. Abnormal liver enzymes  Comprehensive metabolic panel   4. Controlled type 2 diabetes mellitus without complication, without long-term current use of insulin  Hemoglobin A1c    Comprehensive metabolic panel   recheck January with labs.  Her AST/ALT is being rechecked today.

## 2017-09-12 DIAGNOSIS — R74.8 ABNORMAL LIVER ENZYMES: Primary | ICD-10-CM

## 2017-09-13 ENCOUNTER — PATIENT MESSAGE (OUTPATIENT)
Dept: INTERNAL MEDICINE | Facility: CLINIC | Age: 67
End: 2017-09-13

## 2017-09-14 ENCOUNTER — PATIENT MESSAGE (OUTPATIENT)
Dept: INTERNAL MEDICINE | Facility: CLINIC | Age: 67
End: 2017-09-14

## 2017-09-19 ENCOUNTER — PATIENT MESSAGE (OUTPATIENT)
Dept: INTERNAL MEDICINE | Facility: CLINIC | Age: 67
End: 2017-09-19

## 2017-09-19 DIAGNOSIS — G47.00 INSOMNIA, UNSPECIFIED TYPE: ICD-10-CM

## 2017-09-20 RX ORDER — ESZOPICLONE 3 MG/1
TABLET, FILM COATED ORAL
Qty: 90 TABLET | Refills: 0 | Status: SHIPPED | OUTPATIENT
Start: 2017-09-20 | End: 2017-11-27 | Stop reason: SDUPTHER

## 2017-09-22 ENCOUNTER — INITIAL CONSULT (OUTPATIENT)
Dept: GASTROENTEROLOGY | Facility: CLINIC | Age: 67
End: 2017-09-22
Payer: MEDICARE

## 2017-09-22 ENCOUNTER — LAB VISIT (OUTPATIENT)
Dept: LAB | Facility: HOSPITAL | Age: 67
End: 2017-09-22
Attending: INTERNAL MEDICINE
Payer: MEDICARE

## 2017-09-22 VITALS
WEIGHT: 183.63 LBS | HEIGHT: 63 IN | DIASTOLIC BLOOD PRESSURE: 78 MMHG | SYSTOLIC BLOOD PRESSURE: 124 MMHG | BODY MASS INDEX: 32.54 KG/M2 | HEART RATE: 92 BPM

## 2017-09-22 DIAGNOSIS — R79.89 ABNORMAL LFTS: Primary | ICD-10-CM

## 2017-09-22 DIAGNOSIS — B35.1 ONYCHOMYCOSIS: ICD-10-CM

## 2017-09-22 DIAGNOSIS — R79.89 ABNORMAL LFTS: ICD-10-CM

## 2017-09-22 DIAGNOSIS — E78.1 HYPERTRIGLYCERIDEMIA: ICD-10-CM

## 2017-09-22 DIAGNOSIS — E11.9 DIABETES MELLITUS WITHOUT COMPLICATION: ICD-10-CM

## 2017-09-22 LAB
A1AT SERPL-MCNC: 122 MG/DL
CERULOPLASMIN SERPL-MCNC: 37 MG/DL

## 2017-09-22 PROCEDURE — 86704 HEP B CORE ANTIBODY TOTAL: CPT

## 2017-09-22 PROCEDURE — 1126F AMNT PAIN NOTED NONE PRSNT: CPT | Mod: S$GLB,,, | Performed by: INTERNAL MEDICINE

## 2017-09-22 PROCEDURE — 99999 PR PBB SHADOW E&M-EST. PATIENT-LVL III: CPT | Mod: PBBFAC,,, | Performed by: INTERNAL MEDICINE

## 2017-09-22 PROCEDURE — 86256 FLUORESCENT ANTIBODY TITER: CPT

## 2017-09-22 PROCEDURE — 86235 NUCLEAR ANTIGEN ANTIBODY: CPT | Mod: 91

## 2017-09-22 PROCEDURE — 3074F SYST BP LT 130 MM HG: CPT | Mod: S$GLB,,, | Performed by: INTERNAL MEDICINE

## 2017-09-22 PROCEDURE — 99499 UNLISTED E&M SERVICE: CPT | Mod: S$GLB,,, | Performed by: INTERNAL MEDICINE

## 2017-09-22 PROCEDURE — 86706 HEP B SURFACE ANTIBODY: CPT

## 2017-09-22 PROCEDURE — 82390 ASSAY OF CERULOPLASMIN: CPT

## 2017-09-22 PROCEDURE — 3008F BODY MASS INDEX DOCD: CPT | Mod: S$GLB,,, | Performed by: INTERNAL MEDICINE

## 2017-09-22 PROCEDURE — 36415 COLL VENOUS BLD VENIPUNCTURE: CPT

## 2017-09-22 PROCEDURE — 1159F MED LIST DOCD IN RCRD: CPT | Mod: S$GLB,,, | Performed by: INTERNAL MEDICINE

## 2017-09-22 PROCEDURE — 99204 OFFICE O/P NEW MOD 45 MIN: CPT | Mod: S$GLB,,, | Performed by: INTERNAL MEDICINE

## 2017-09-22 PROCEDURE — 3078F DIAST BP <80 MM HG: CPT | Mod: S$GLB,,, | Performed by: INTERNAL MEDICINE

## 2017-09-22 PROCEDURE — 82103 ALPHA-1-ANTITRYPSIN TOTAL: CPT

## 2017-09-22 NOTE — PROGRESS NOTES
Subjective:       Patient ID: Cassandra Campos is a 67 y.o. female.    Chief Complaint: Elevated Hepatic Enzymes    The patient has abn LFTs. She has recently had Gastric Sleve on February 13 and has lost 30 pounds since then. She has had low level elevations except on 2 occasions when the increases have spiked. Her acute hepatitis panel is normal. Her triglyceride levels are normal and have decreased. She has not had an abdominal U/S. There is no clear pattern related to intake of medications or supplements. She has never been transfused. She has never used IV drugs but has used cocaine in college years ago. 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 The patient also has history of Type 2 DM with hyperlipidemia which makes her set up for fatty liver; however, these spikes in the abnormalities of her LFTs do not seem to have weight correlation as the patient has been losing weight since her gastric sleeve.      Review of Systems   Constitutional: Positive for unexpected weight change. Negative for activity change, appetite change, chills, diaphoresis, fatigue and fever.        Weight loss because of surgery.   HENT: Negative for congestion, ear discharge, ear pain, hearing loss, nosebleeds, postnasal drip and tinnitus.         Loss sense of smell.   Eyes: Negative for photophobia and visual disturbance.   Respiratory: Negative for apnea, cough, choking, chest tightness, shortness of breath and wheezing.    Cardiovascular: Negative for chest pain, palpitations and leg swelling.   Gastrointestinal: Positive for constipation. Negative for abdominal distention, abdominal pain, anal bleeding, blood in stool, diarrhea, nausea, rectal pain and vomiting.   Genitourinary: Negative for difficulty urinating, dyspareunia, dysuria, flank pain, frequency, hematuria, menstrual problem, pelvic pain, urgency, vaginal bleeding and vaginal discharge.   Musculoskeletal: Negative for  arthralgias, back pain, gait problem, joint swelling, myalgias and neck stiffness.   Skin: Negative for pallor and rash.   Neurological: Negative for dizziness, tremors, seizures, syncope, speech difficulty, weakness, numbness and headaches.   Hematological: Negative for adenopathy.   Psychiatric/Behavioral: Negative for agitation, confusion, hallucinations, sleep disturbance and suicidal ideas.       Objective:      Physical Exam   Constitutional: She is oriented to person, place, and time. She appears well-developed and well-nourished.   HENT:   Head: Normocephalic and atraumatic.   Bilateral turbinate congestion   Eyes: Conjunctivae and EOM are normal. Pupils are equal, round, and reactive to light. Right eye exhibits no discharge. Left eye exhibits no discharge. No scleral icterus.   Neck: Normal range of motion. Neck supple. No JVD present. No thyromegaly present.   Cardiovascular: Normal rate, regular rhythm, normal heart sounds and intact distal pulses.  Exam reveals no gallop and no friction rub.    No murmur heard.  Pulmonary/Chest: Effort normal and breath sounds normal. No respiratory distress. She has no wheezes. She has no rales. She exhibits no tenderness.   Abdominal: Soft. Bowel sounds are normal. She exhibits no distension and no mass. There is no tenderness. There is no rebound and no guarding.   Musculoskeletal: Normal range of motion. She exhibits no edema.   Lymphadenopathy:     She has no cervical adenopathy.   Neurological: She is alert and oriented to person, place, and time. She has normal reflexes. She exhibits normal muscle tone. Coordination normal.   Skin: Skin is warm and dry. No rash noted. No erythema. No pallor.   Psychiatric: She has a normal mood and affect. Her behavior is normal. Judgment and thought content normal.   Vitals reviewed.      Assessment:     Abnormal LFTs    Uncomplicated type 2 diabetes mellitus    Hyperlipidemia    Hypertension    Onychomycosis  No diagnosis  found.    Plan:     Ultrasound. Related labs

## 2017-09-25 ENCOUNTER — NURSE TRIAGE (OUTPATIENT)
Dept: ADMINISTRATIVE | Facility: CLINIC | Age: 67
End: 2017-09-25

## 2017-09-25 LAB
HBV CORE AB SERPL QL IA: NEGATIVE
HBV SURFACE AB SER-ACNC: NEGATIVE M[IU]/ML
MITOCHONDRIA AB TITR SER IF: NORMAL {TITER}
SMOOTH MUSCLE AB TITR SER IF: NORMAL {TITER}

## 2017-09-25 NOTE — TELEPHONE ENCOUNTER
Reason for Disposition   Fall in systolic BP > 20 mm Hg from normal and NOT dizzy, lightheaded, or weak   Mild-moderate nosebleed and bleeding has stopped now    Protocols used: ST LOW BLOOD PRESSURE-A-OH, ST NOSEBLEED-A-OH    Pt calling with concerns of nosebleeds and low blood pressure reading.  Pt states she had a nosebleed earlier this morning (nosebleed has stopped).  Blood pressure reading after nosebleed 111/64, earlier BP reading of 126/75.  Slight headache per Pt.  Care advice given.  Will message MD and Staff.

## 2017-10-03 ENCOUNTER — TELEPHONE (OUTPATIENT)
Dept: RADIOLOGY | Facility: HOSPITAL | Age: 67
End: 2017-10-03

## 2017-10-04 ENCOUNTER — HOSPITAL ENCOUNTER (OUTPATIENT)
Dept: RADIOLOGY | Facility: HOSPITAL | Age: 67
Discharge: HOME | End: 2017-10-04
Attending: INTERNAL MEDICINE
Payer: MEDICARE

## 2017-10-04 ENCOUNTER — OFFICE VISIT (OUTPATIENT)
Dept: PULMONOLOGY | Facility: CLINIC | Age: 67
End: 2017-10-04
Payer: MEDICARE

## 2017-10-04 VITALS
RESPIRATION RATE: 18 BRPM | WEIGHT: 181.88 LBS | DIASTOLIC BLOOD PRESSURE: 68 MMHG | OXYGEN SATURATION: 97 % | BODY MASS INDEX: 32.23 KG/M2 | HEIGHT: 63 IN | SYSTOLIC BLOOD PRESSURE: 110 MMHG | HEART RATE: 88 BPM

## 2017-10-04 DIAGNOSIS — R06.2 WHEEZING: ICD-10-CM

## 2017-10-04 DIAGNOSIS — J01.01 ACUTE RECURRENT MAXILLARY SINUSITIS: ICD-10-CM

## 2017-10-04 DIAGNOSIS — R06.02 SOB (SHORTNESS OF BREATH): ICD-10-CM

## 2017-10-04 DIAGNOSIS — R79.89 ABNORMAL LFTS: ICD-10-CM

## 2017-10-04 DIAGNOSIS — I10 ESSENTIAL HYPERTENSION: ICD-10-CM

## 2017-10-04 DIAGNOSIS — E11.9 DIABETES MELLITUS WITHOUT COMPLICATION: ICD-10-CM

## 2017-10-04 DIAGNOSIS — R93.89 ABNORMAL CXR: Primary | ICD-10-CM

## 2017-10-04 DIAGNOSIS — K21.9 GASTROESOPHAGEAL REFLUX DISEASE, ESOPHAGITIS PRESENCE NOT SPECIFIED: Chronic | ICD-10-CM

## 2017-10-04 DIAGNOSIS — J20.9 ACUTE BRONCHITIS, UNSPECIFIED ORGANISM: ICD-10-CM

## 2017-10-04 PROCEDURE — 76700 US EXAM ABDOM COMPLETE: CPT | Mod: 26,,, | Performed by: RADIOLOGY

## 2017-10-04 PROCEDURE — 76700 US EXAM ABDOM COMPLETE: CPT | Mod: TC,PO

## 2017-10-04 PROCEDURE — G0008 ADMIN INFLUENZA VIRUS VAC: HCPCS | Mod: S$GLB,,, | Performed by: INTERNAL MEDICINE

## 2017-10-04 PROCEDURE — 99999 PR PBB SHADOW E&M-EST. PATIENT-LVL IV: CPT | Mod: PBBFAC,,, | Performed by: INTERNAL MEDICINE

## 2017-10-04 PROCEDURE — 99205 OFFICE O/P NEW HI 60 MIN: CPT | Mod: S$GLB,,, | Performed by: INTERNAL MEDICINE

## 2017-10-04 PROCEDURE — 99499 UNLISTED E&M SERVICE: CPT | Mod: S$GLB,,, | Performed by: INTERNAL MEDICINE

## 2017-10-04 PROCEDURE — 90662 IIV NO PRSV INCREASED AG IM: CPT | Mod: S$GLB,,, | Performed by: INTERNAL MEDICINE

## 2017-10-04 RX ORDER — ALBUTEROL SULFATE 90 UG/1
2 AEROSOL, METERED RESPIRATORY (INHALATION)
Qty: 1 INHALER | Refills: 11 | Status: SHIPPED | OUTPATIENT
Start: 2017-10-04 | End: 2018-07-10 | Stop reason: SDUPTHER

## 2017-10-04 NOTE — PATIENT INSTRUCTIONS
Diagnosing Chest and Lung Problems: Imaging Tests  Youve been told that you need imaging tests to diagnose a problem in your chest or lung. These images (scans) help the healthcare provider find the problem and figure out if it affects other structures. You will likely need more than one imaging test. If a mass has been found, imaging tests can also help find out if it has spread. Common imaging tests are described below.  CT scan  CT scans allow the healthcare provider to see a more detailed picture of the chest and lungs than a regular chest X-ray. During a CT scan, many images are taken of the lungs and chest. A computer combines the images to create one detailed image. In some cases, special dye (contrast) is given through an IV (intravenous) line. The contrast highlights any suspicious area on the scan.  PET scan    Positron emission tomography (PET) is used to diagnose chest and lung problems. For a PET scan, a safe radioactive liquid (tracer) is injected into the bloodstream. It takes about 45 minutes for the tracer to be in your system. Once the tracer is there, the healthcare provider takes a scan of your body. A PET scan can be helpful for finding cancer. It can also help find out if a cancer has spread. This is called staging. In some cases, you may need more testing before cancer is diagnosed or ruled out.  MRI   Like the CT scan, MRI takes many detailed images of the chest and lungs without the use of X-ray radiation. Contrast dye may be given through an IV line. The healthcare provider then takes a scan. MRI helps your provider find out if a mass is affecting other structures in the chest, such as blood vessels.  Getting ready for the test  Before your imaging test, do the following:  · Follow your healthcare providers instructions about eating and drinking  · Tell your provider about the medicines you take. You may need to stop taking certain medicines before the test.  · Discuss any allergies and  health problems with your healthcare provider. Be sure to tell him or her if you are allergic to iodine or contrast or if you have kidney problems.  · Tell your healthcare provider and the healthcare person doing the scan if you wear a medicated adhesive patch.  · Mention if you have any metal in your body. This includes loose pieces of metal or metal devices such as an aneurysm clip, a pacemaker, a prosthesis, braces on your teeth, or an intraocular lens.  · Tell your healthcare provider if you are pregnant.  During the test  For the test, you lie on your back inside a tube-like machine (scanner). You hear loud clicking sounds as images are taken. To make sure the images taken are clear, you must lie still. Straps may be used to help with this. Imaging tests (especially MRI) are done in a confined space. Talk with your healthcare provider before the day of your test if you are afraid of confined spaces. You may be given medicine (sedation) to help you relax during the test.  Risks and complications  · Swelling, infection, or other problems at the IV site  · Contrast or tracer-related problems, such as allergic reaction or kidney damage  · Damage to metal devices or prostheses from large magnetic MRI scanner   Date Last Reviewed: 11/1/2016 © 2000-2017 [employer] [address] [place] [address], [place] [address]. All rights reserved. This information is not intended as a substitute for professional medical care. Always follow your healthcare professional's instructions.        Albuterol inhalation aerosol  What is this medicine?  ALBUTEROL (al BYOO ter ole) is a bronchodilator. It helps open up the airways in your lungs to make it easier to breathe. This medicine is used to treat and to prevent bronchospasm.  How should I use this medicine?  This medicine is for inhalation through the mouth. Follow the directions on your prescription label. Take your medicine at regular intervals. Do not use more often than  directed. Make sure that you are using your inhaler correctly. Ask you doctor or health care provider if you have any questions.  Talk to your pediatrician regarding the use of this medicine in children. Special care may be needed.  What side effects may I notice from receiving this medicine?  Side effects that you should report to your doctor or health care professional as soon as possible:  · allergic reactions like skin rash, itching or hives, swelling of the face, lips, or tongue  · breathing problems  · chest pain  · feeling faint or lightheaded, falls  · high blood pressure  · irregular heartbeat  · fever  · muscle cramps or weakness  · pain, tingling, numbness in the hands or feet  · vomiting  Side effects that usually do not require medical attention (report to your doctor or health care professional if they continue or are bothersome):  · cough  · difficulty sleeping  · headache  · nervousness or trembling  · stomach upset  · stuffy or runny nose  · throat irritation  · unusual taste  What may interact with this medicine?  · anti-infectives like chloroquine and pentamidine  · caffeine  · cisapride  · diuretics  · medicines for colds  · medicines for depression or for emotional or psychotic conditions  · medicines for weight loss including some herbal products  · methadone  · some antibiotics like clarithromycin, erythromycin, levofloxacin, and linezolid  · some heart medicines  · steroid hormones like dexamethasone, cortisone, hydrocortisone  · theophylline  · thyroid hormones  What if I miss a dose?  If you miss a dose, use it as soon as you can. If it is almost time for your next dose, use only that dose. Do not use double or extra doses.  Where should I keep my medicine?  Keep out of the reach of children.  Store at room temperature between 15 and 30 degrees C (59 and 86 degrees F). The contents are under pressure and may burst when exposed to heat or flame. Do not freeze. This medicine does not work as  well if it is too cold. Throw away any unused medicine after the expiration date. Inhalers need to be thrown away after the labeled number of puffs have been used or by the expiration date; whichever comes first. Ventolin HFA should be thrown away 12 months after removing from foil pouch. Check the instructions that come with your medicine.  What should I tell my health care provider before I take this medicine?  They need to know if you have any of the following conditions:  · diabetes  · heart disease or irregular heartbeat  · high blood pressure  · pheochromocytoma  · seizures  · thyroid disease  · an unusual or allergic reaction to albuterol, levalbuterol, sulfites, other medicines, foods, dyes, or preservatives  · pregnant or trying to get pregnant  · breast-feeding  What should I watch for while using this medicine?  Tell your doctor or health care professional if your symptoms do not improve. Do not use extra albuterol. If your asthma or bronchitis gets worse while you are using this medicine, call your doctor right away.  If your mouth gets dry try chewing sugarless gum or sucking hard candy. Drink water as directed.  NOTE:This sheet is a summary. It may not cover all possible information. If you have questions about this medicine, talk to your doctor, pharmacist, or health care provider. Copyright© 2017 [employer]        Inhaler Use  The inhaler that you were prescribed contains a potent medicine. It should only be used as directed. The medicine in your inhaler must be breathed deeply into your lungs for it to work. It will not work at all if it only reaches your mouth and throat. Follow the instructions below for best results. And remember to follow your asthma action plan as given to you by your doctor.  1. Keep your inhaler at room temperature.  2. Hold the inhaler so that the part that goes into your mouth is at the bottom.  3. Shake the inhaler well and remove the cap.  4. Breathe out through your  mouth to fully empty your lungs.  5. Place the inhaler in your mouth and close your lips tightly around it. (Or hold the inhaler 1 to 2 inches from your open mouth if told to do so by your healthcare provider.)  6. Squeeze the inhaler as you breathe in slowly through your mouth until your lungs are full of air, drawing the medicine deep into your lungs.  7. Hold your breath for 10 seconds, or as long as you can comfortably hold your breath. Then breathe out slowly.  8. If you have been advised to take 2 puffs, wait 5 minutes, then repeat steps 3-7 above. Waiting 5 minutes between puffs will alow the medicine to open up your lungs so the second puff can get deeper into the lungs. Replace the cap when done.  9. If you were prescribed both a steroid inhaler and a bronchodilator inhaler, use the bronchodilator first to open the air passages. Wait 5 minutes, then use the steroid inhaler.  10. Rinse your mouth with water and spit it out (especially after using a steroid inhaler). This is very important if you are using a steroid inhaler to prevent thrush, a mild yeast infection of the mouth and back of the throat.  11. A special chamber (spacer) may be prescribed that attaches to your inhaler. This increases the amount of medicine that goes to your lungs. It also improves how well each treatment works. Ask your doctor about this if you did not receive one.    Keep it clean  Remove the metal canister and do not immerse it in water. Then clean the plastic mouthpiece, cap, and spacer if you have one, by rinsing them well in warm running water for 30 to 60 seconds. Shake off excess water and allow the mouthpiece to dry completely (overnight is recommended). This should be done once a week. If you need the inhaler before the mouthpiece is dry, shake off excess water, replace canister, and test spray 2 times (away from the face).  Warning  A steroid inhaler is used to prevent an asthma attack. Do not use this to treat an  "acute wheezing episode. Use only bronchodilator inhalers (quick relief) to treat an acute asthma attack.  If you find that your medicine is not working and you need to use it more often than prescribed, this could be a sign that your asthma is getting worse. Go to the emergency room or urgent care right away. An asthma attack is easiest to treat in the early stages before it becomes severe.  When to seek medical advice  Get prompt medical attention if any of the following occur:  · Increased wheezing or shortness of breath  · Need to use your inhalers more often than usual without relief  · Fever of 100.4°F (38°C) or higher, or as directed by your healthcare provider  · Coughing up lots of dark-colored or bloody sputum (mucus)  · Chest pain with each breath  · Blue lips or fingernails  · Peak flow reading less than 50% of your normal best  Date Last Reviewed: 12/2/2015  © 9183-2404 [employer]. [address] [place] [address]. All rights reserved. This information is not intended as a substitute for professional medical care. Always follow your healthcare professional's instructions.        Using an Inhaler  Your healthcare provider may prescribe medicine that you breathe in using a metered-dose inhaler (MDI). An inhaler sends a measured amount of medicine in a fine mist.  Step 1:  · Shake the inhaler and remove the cap.  · Take a deep breath and let it out.  Step 2:  · Close your lips around the end of the inhaler mouthpiece. Or if you were told to use the "open-mouth" method, hold the inhaler 1 to 2 inches from your mouth.  Step 3:  · Breathe in slowly and deeply as you press down on the inhaler to release the medicine.  · Inhale fully.  Step 4:  · Hold your breath for a count of 10, or as long as you can comfortably.  · Then breathe out slowly through your mouth.  · Repeat these steps for each puff of medicine prescribed.             Important  · If the inhaler is being used for the first " time or has not been used for a while, prime it as directed by the product maker. You can find important information about the medicine in the package insert. This is the paper that comes with the medicine.  · If you use more than one inhaler, make sure you know which one to use first.  · Your healthcare provider or pharmacist can show you how to use your inhaler the right way. Even if you think you are using it the right way, it is still a good idea to check.   Date Last Reviewed: 10/1/2016  © 1328-5186 [employer]. [address] [place] [address]. All rights reserved. This information is not intended as a substitute for professional medical care. Always follow your healthcare professional's instructions.

## 2017-10-04 NOTE — PROGRESS NOTES
Subjective:       Patient ID: Cassandra Campos is a 67 y.o. female.    Chief Complaint: She       Chest Congestion    HPI     Exercises regularly  Intermittent cough and chest congestion  Sinus congestion - hole in nose from snorting cocaine  Smoked 1ppd for many years    Asthma  She presents for initial evaluation of chronic breathing problems and a history of wheezing. The patient has not been previously diagnosed with asthma. The patient is not currently have symptoms / an exacerbation. The patient has been having episodes for approximately 2 months. Symptoms in previous episodes have included dyspnea, non-productive cough and wheezing, and typically last 2 weeks. Previous episodes have been triggered by smoke. Treatments tried during prior episodes include short-acting inhaled beta-adrenergic agonists, which usually provides some relief of symptoms.        Current Disease Severity  The patient is having daytime symptoms less than or equal to 2 days per week. The patient is having daytime symptoms less than or equal to 2 times per month. The patient is using short-acting beta agonists for symptom control less than or equal to 2 days per week. She has exacerbations requiring oral systemic corticosteroids 0 times per year. Current limitations in activity from asthma: none. Number of days of school or work missed in the last month: not applicable. Number of urgent/emergent visit in last year: 0.  The patient is not using a spacer with MDIs. Her best peak flow rate is na. She is not monitoring peak flow rates at home.        Past Medical History:   Diagnosis Date    Arthritis     hands    Borderline glaucoma     COPD (chronic obstructive pulmonary disease)     pt states she does not have copd    Diabetes mellitus, type 2     Gastritis     Gastritis     upper GI 2/2017    Hydradenitis     Hyperlipidemia     Hypertension     Insomnia     Migraines 02/01/2000    Morbid obesity due to excess calories  2016    Nasal septum perforation     Obesity     Pneumonia     Sleep apnea     SVT (supraventricular tachycardia) 2013    Type 2 diabetes mellitus     Type II or unspecified type diabetes mellitus without mention of complication, not stated as uncontrolled 2013     Past Surgical History:   Procedure Laterality Date    AXILLARY HIDRADENITIS EXCISION      BREAST LUMPECTOMY Bilateral 1998    Benign    CARPAL TUNNEL RELEASE      bilateral    CATARACT EXTRACTION Bilateral     OU     SECTION  1979    CHOLECYSTECTOMY  2014    CYST REMOVAL  2015    sebaceous cyst removed from face    gastric sleeve  2017    Dr. Watson    KNEE SURGERY Right     TONSILLECTOMY, ADENOIDECTOMY  1980s    TRIGGER FINGER RELEASE Right 2015    Dr. Pedraza     Social History     Social History    Marital status:      Spouse name: N/A    Number of children: 1    Years of education: N/A     Occupational History     aid      Social History Main Topics    Smoking status: Former Smoker     Packs/day: 0.50     Years: 30.00     Types: Cigarettes     Start date: 1970     Quit date: 2012    Smokeless tobacco: Never Used    Alcohol use Yes      Comment: rarely // wine  No alcohol prior to surgery    Drug use: No    Sexual activity: Not Currently     Partners: Male     Other Topics Concern    Not on file     Social History Narrative    Single, part-time teacher. Masters degree biology.      Review of Systems   Constitutional: Positive for fatigue. Negative for fever.   HENT: Positive for postnasal drip, rhinorrhea and congestion.    Eyes: Negative for redness and itching.   Respiratory: Positive for cough, sputum production, shortness of breath, dyspnea on extertion, use of rescue inhaler and Paroxysmal Nocturnal Dyspnea.    Cardiovascular: Negative for chest pain, palpitations and leg swelling.   Genitourinary: Negative for difficulty urinating and  hematuria.   Endocrine: Negative for cold intolerance and heat intolerance.    Skin: Negative for rash.   Gastrointestinal: Negative for nausea and abdominal pain.   Neurological: Negative for dizziness, syncope, weakness and light-headedness.   Hematological: Negative for adenopathy. Does not bruise/bleed easily.   Psychiatric/Behavioral: Negative for sleep disturbance. The patient is not nervous/anxious.        Objective:      Physical Exam   Constitutional: She is oriented to person, place, and time. She appears well-developed and well-nourished.   HENT:   Head: Normocephalic and atraumatic.   Mouth/Throat: Oropharyngeal exudate present.   Eyes: Conjunctivae are normal. Pupils are equal, round, and reactive to light.   Neck: Neck supple. No JVD present. No tracheal deviation present. No thyromegaly present.   Cardiovascular: Normal rate, regular rhythm and normal heart sounds.    Pulmonary/Chest: Effort normal. No respiratory distress. She has decreased breath sounds. She has wheezes in the right lower field and the left lower field. She has no rhonchi. She has no rales. She exhibits no tenderness.   Abdominal: Soft. Bowel sounds are normal.   Musculoskeletal: Normal range of motion. She exhibits no edema.   Lymphadenopathy:     She has no cervical adenopathy.   Neurological: She is alert and oriented to person, place, and time.   Skin: Skin is warm and dry.   Nursing note and vitals reviewed.    Personal Diagnostic Review  Chest x-ray: min interstitial changes  Radiology Result      Name:   :  Patient MRN:   Cassandra Campos 1950 [id number]   Account Number: Room & Bed Accession Number:   [id number]   [id number]   Authorizing Physician: Patient Class: Diagnosis:   Danna Mai OP- Outpatient Diagnostic Testing Wheezing [R06.2 (ICD-10-CM)]  Acute bronchitis, unspecified organism [J20.9 (ICD-10-CM)]  Acute recurrent maxillary sinusitis [J01.01 (ICD-10-CM)]   Procedure:  Exam Date: Reason for Exam:    X-Ray Chest PA And Lateral 08/01/2017 cough with wheezing          RESULTS:  Comparison: 05/30/2017     2 views     Findings:     Heart and pulmonary vasculature are within normal limits.  Minimal prominence interstitial markings without consolidation.  Minimal bilateral apical pleural thickening.  Aorta is tortuous and trachea midline.  CP angles are sharp.  Mild spondylosis.  IMPRESSION:          Stable exam without acute infiltrate.           Electronically signed by: ARIE WINTERS III, MD  Date:                                            08/01/17  Time:                                           10:51           Signed By:  Arie Winters III, MD on 8/1/2017 10:51 AM           No flowsheet data found.      Assessment:       1. Abnormal CXR    2. SOB (shortness of breath)    3. Gastroesophageal reflux disease, esophagitis presence not specified    4. Wheezing    5. Acute bronchitis, unspecified organism    6. Acute recurrent maxillary sinusitis    7. Essential hypertension        Outpatient Encounter Prescriptions as of 10/4/2017   Medication Sig Dispense Refill    ACCU-CHEK FASTCLIX Misc USE ONE TIME DAILY 100 each 3    ACCU-CHEK SMARTVIEW TEST STRIP Strp USE TO TEST ONCE DAILY 100 strip 3    albuterol 90 mcg/actuation inhaler Inhale 2 puffs into the lungs every 4 to 6 hours as needed for Wheezing. 1 Inhaler 11    amitriptyline (ELAVIL) 100 MG tablet TAKE 1 TABLET NIGHTLY 90 tablet 3    blood-glucose meter kit Use as instructed 1 each 0    calcium citrate (CALCITRATE) 200 mg (950 mg) tablet Take 1 tablet by mouth once daily. Ca 630 mg/Vit D 500IU - Pt takes 1 tab three times daily      eszopiclone 3 mg Tab TAKE 1 TABLET EVERY NIGHT AS NEEDED 90 tablet 0    metformin (GLUCOPHAGE-XR) 500 MG 24 hr tablet TAKE 3 TABLETS ONE TIME DAILY 270 tablet 0    metoprolol succinate (TOPROL-XL) 50 MG 24 hr tablet Take 1 tablet (50 mg total) by mouth once daily. (Patient taking differently: Take 50 mg by mouth every  evening. ) 90 tablet 1    multivitamin (ONE DAILY MULTIVITAMIN) per tablet Take 1 tablet by mouth 2 (two) times daily.       omeprazole (PRILOSEC) 20 MG capsule Take 1 capsule (20 mg total) by mouth once daily. (Patient taking differently: Take 20 mg by mouth every evening. ) 90 capsule 1    pravastatin (PRAVACHOL) 40 MG tablet TAKE 1 TABLET ONE TIME DAILY 90 tablet 1    [DISCONTINUED] albuterol 90 mcg/actuation inhaler Inhale 2 puffs into the lungs every 4 to 6 hours as needed for Wheezing. 1 Inhaler 1    b complex vitamins tablet Take 1 tablet by mouth every evening.       cyanocobalamin, vitamin B-12, (VITAMIN B-12) 50 mcg tablet Take 50 mcg by mouth once daily.      ferrous sulfate 325 (65 FE) MG EC tablet Take 18 mg by mouth once daily.       nabumetone (RELAFEN) 500 MG tablet Take 1 tablet (500 mg total) by mouth once daily. 30 tablet 2     No facility-administered encounter medications on file as of 10/4/2017.      Orders Placed This Encounter   Procedures    CT Chest Without Contrast     Standing Status:   Future     Standing Expiration Date:   10/4/2018     Order Specific Question:   May the Radiologist modify the order per protocol to meet the clinical needs of the patient?     Answer:   Yes    Influenza - High Dose (65+) (PF) (IM)    Spirometry with/without bronchodilator     Standing Status:   Future     Standing Expiration Date:   10/4/2018     Plan:       Requested Prescriptions     Signed Prescriptions Disp Refills    albuterol 90 mcg/actuation inhaler 1 Inhaler 11     Sig: Inhale 2 puffs into the lungs every 4 to 6 hours as needed for Wheezing.     Abnormal CXR  -     CT Chest Without Contrast; Future; Expected date: 10/04/2017    SOB (shortness of breath)  -     Spirometry with/without bronchodilator; Future; Expected date: 04/06/2018  -     Influenza - High Dose (65+) (PF) (IM)    Gastroesophageal reflux disease, esophagitis presence not specified    Wheezing  -     albuterol 90  mcg/actuation inhaler; Inhale 2 puffs into the lungs every 4 to 6 hours as needed for Wheezing.  Dispense: 1 Inhaler; Refill: 11    Acute bronchitis, unspecified organism  -     albuterol 90 mcg/actuation inhaler; Inhale 2 puffs into the lungs every 4 to 6 hours as needed for Wheezing.  Dispense: 1 Inhaler; Refill: 11    Acute recurrent maxillary sinusitis    Essential hypertension           Return in about 2 weeks (around 10/18/2017) for review CT and ami.    MEDICAL DECISION MAKING: Moderate to high complexity.  Overall, the multiple problems listed are of moderate to high severity that may impact quality of life and activities of daily living. Side effects of medications, treatment plan as well as options and alternatives reviewed and discussed with patient. There was counseling of patient concerning these issues.60    Total time spent in face to face counseling and coordination of care - 60  minutes over 50% of time was used in discussion of prognosis, risks, benefits of treatment, instructions and compliance with regimen . Discussion with other physicians or health care providers (DME, NP, pharmacy, respiratory therapy) occurred.

## 2017-10-04 NOTE — LETTER
October 6, 2017      Denia Maldonado MD  [address] [place] [address]           [facility]  [address] [place] [address]  Phone: [phone]  Fax: [phone]          Patient: Cassandra Campos   MR Number: [id number]   YOB: 1950   Date of Visit: 10/4/2017       Dear Dr. Denia Maldonado:    Thank you for referring Cassandra Campos to me for evaluation. Attached you will find relevant portions of my assessment and plan of care.    If you have questions, please do not hesitate to call me. I look forward to following Cassandra Campos along with you.    Sincerely,    Nixon Mcdermott MD    Enclosure  CC:  No Recipients    If you would like to receive this communication electronically, please contact [email or web address] or [phone] to request more information on [employer] access.    For providers and/or their staff who would like to refer a patient to [facility], please contact us through our one-stop-shop provider referral line, [facility], at [phone].    If you feel you have received this communication in error or would no longer like to receive these types of communications, please e-mail [email or web address]

## 2017-10-06 ENCOUNTER — PATIENT MESSAGE (OUTPATIENT)
Dept: GASTROENTEROLOGY | Facility: CLINIC | Age: 67
End: 2017-10-06

## 2017-10-09 ENCOUNTER — PATIENT MESSAGE (OUTPATIENT)
Dept: INTERNAL MEDICINE | Facility: CLINIC | Age: 67
End: 2017-10-09

## 2017-10-09 ENCOUNTER — OFFICE VISIT (OUTPATIENT)
Dept: GASTROENTEROLOGY | Facility: CLINIC | Age: 67
End: 2017-10-09
Payer: MEDICARE

## 2017-10-09 VITALS
BODY MASS INDEX: 36.02 KG/M2 | HEIGHT: 60 IN | SYSTOLIC BLOOD PRESSURE: 112 MMHG | HEART RATE: 84 BPM | WEIGHT: 183.44 LBS | DIASTOLIC BLOOD PRESSURE: 70 MMHG

## 2017-10-09 DIAGNOSIS — E11.9 TYPE 2 DIABETES MELLITUS WITHOUT COMPLICATION, WITHOUT LONG-TERM CURRENT USE OF INSULIN: ICD-10-CM

## 2017-10-09 DIAGNOSIS — E78.5 HYPERLIPIDEMIA, UNSPECIFIED HYPERLIPIDEMIA TYPE: ICD-10-CM

## 2017-10-09 DIAGNOSIS — R16.0 HEPATOMEGALY: ICD-10-CM

## 2017-10-09 DIAGNOSIS — R79.89 ABNORMAL LIVER FUNCTION TESTS: Primary | ICD-10-CM

## 2017-10-09 PROCEDURE — 99212 OFFICE O/P EST SF 10 MIN: CPT | Mod: S$GLB,,, | Performed by: INTERNAL MEDICINE

## 2017-10-09 PROCEDURE — 99499 UNLISTED E&M SERVICE: CPT | Mod: S$GLB,,, | Performed by: INTERNAL MEDICINE

## 2017-10-09 PROCEDURE — 99999 PR PBB SHADOW E&M-EST. PATIENT-LVL II: CPT | Mod: PBBFAC,,, | Performed by: INTERNAL MEDICINE

## 2017-10-09 NOTE — PROGRESS NOTES
Subjective:       Patient ID: Cassandra Campos is a 67 y.o. female.    Chief Complaint: Follow-up    The patient has been seen regarding  abnormal liver function tests.  Her initial consultation was done on September 22 in the details of that evaluation were reviewed today with the patient as outlined in the note in the record from that date.    At the time of the patient's visit, labs to assess for alternative etiologies to the patient's abnormal liver function tests were undertaken.  All of the studies are reviewed today and found to be normal.  Abdominal ultrasound was also done and findings included a post cholecystectomy situation and associated bile duct dilatation.  There was also mild hepatomegaly noted which may be suggestive of the possibility of fatty infiltration of the liver.  This does not explain all of the findings and the patient's history, however, because as outlined in her note from the previous visit, the changes in her weight do not appear to correspond with the SPIKES and decreases in the patient's values.  The patient definitely has risk factors for fatty infiltration and she remains well above her ideal body weight though her weight has decreased significantly since her gastric sleeve.    We have also discussed the fact that she is on therapy for cholesterol.  We may also have to consider discontinuing this; however, there doesn't seem to be a good correlation with the changes in her labs and dosing of her Pravachol.    The patient is also had significant weight loss which she feels is beyond what could be contributed to her sleeve alone.  There have been improvements in her other conditions so we will continue to observe to see of there is any improvement as this process continues.  If not, the patient may have to undergo liver biopsy in referral to hepatology.      Review of Systems    Objective:      Physical Exam    Assessment:     Abnormal liver function tests    Hepatomegaly     Hyperlipidemia    Type 2 diabetes mellitus  No diagnosis found.    Plan:     As outlined above.

## 2017-10-13 ENCOUNTER — PATIENT MESSAGE (OUTPATIENT)
Dept: INTERNAL MEDICINE | Facility: CLINIC | Age: 67
End: 2017-10-13

## 2017-10-17 ENCOUNTER — PATIENT MESSAGE (OUTPATIENT)
Dept: INTERNAL MEDICINE | Facility: CLINIC | Age: 67
End: 2017-10-17

## 2017-10-19 ENCOUNTER — PATIENT MESSAGE (OUTPATIENT)
Dept: INTERNAL MEDICINE | Facility: CLINIC | Age: 67
End: 2017-10-19

## 2017-10-25 ENCOUNTER — TELEPHONE (OUTPATIENT)
Dept: RADIOLOGY | Facility: HOSPITAL | Age: 67
End: 2017-10-25

## 2017-10-26 ENCOUNTER — PROCEDURE VISIT (OUTPATIENT)
Dept: PULMONOLOGY | Facility: CLINIC | Age: 67
End: 2017-10-26
Payer: MEDICARE

## 2017-10-26 ENCOUNTER — TELEPHONE (OUTPATIENT)
Dept: PULMONOLOGY | Facility: CLINIC | Age: 67
End: 2017-10-26

## 2017-10-26 ENCOUNTER — OFFICE VISIT (OUTPATIENT)
Dept: PULMONOLOGY | Facility: CLINIC | Age: 67
End: 2017-10-26
Payer: MEDICARE

## 2017-10-26 ENCOUNTER — HOSPITAL ENCOUNTER (OUTPATIENT)
Dept: RADIOLOGY | Facility: HOSPITAL | Age: 67
Discharge: HOME | End: 2017-10-26
Attending: INTERNAL MEDICINE
Payer: MEDICARE

## 2017-10-26 VITALS
BODY MASS INDEX: 32.15 KG/M2 | HEIGHT: 63 IN | OXYGEN SATURATION: 97 % | DIASTOLIC BLOOD PRESSURE: 76 MMHG | HEART RATE: 104 BPM | SYSTOLIC BLOOD PRESSURE: 108 MMHG | WEIGHT: 181.44 LBS

## 2017-10-26 DIAGNOSIS — R93.89 ABNORMAL CXR: ICD-10-CM

## 2017-10-26 DIAGNOSIS — R06.02 SOB (SHORTNESS OF BREATH): ICD-10-CM

## 2017-10-26 DIAGNOSIS — R91.1 PULMONARY NODULE: Primary | ICD-10-CM

## 2017-10-26 DIAGNOSIS — J45.20 MILD INTERMITTENT ASTHMA, UNSPECIFIED WHETHER COMPLICATED: ICD-10-CM

## 2017-10-26 PROCEDURE — 71250 CT THORAX DX C-: CPT | Mod: 26,,, | Performed by: RADIOLOGY

## 2017-10-26 PROCEDURE — 99215 OFFICE O/P EST HI 40 MIN: CPT | Mod: 25,S$GLB,, | Performed by: INTERNAL MEDICINE

## 2017-10-26 PROCEDURE — 94060 EVALUATION OF WHEEZING: CPT | Mod: S$GLB,,, | Performed by: INTERNAL MEDICINE

## 2017-10-26 PROCEDURE — 99499 UNLISTED E&M SERVICE: CPT | Mod: S$GLB,,, | Performed by: INTERNAL MEDICINE

## 2017-10-26 PROCEDURE — 99999 PR PBB SHADOW E&M-EST. PATIENT-LVL IV: CPT | Mod: PBBFAC,,, | Performed by: INTERNAL MEDICINE

## 2017-10-26 PROCEDURE — 71250 CT THORAX DX C-: CPT | Mod: TC,PO

## 2017-10-26 NOTE — PROGRESS NOTES
Subjective:       Patient ID: Cassandra Campos is a 67 y.o. female.    Chief Complaint: She       No chief complaint on file.    HPI     Exercises regularly  Intermittent cough and chest congestion  Sinus congestion - hole in nose from snorting cocaine  Smoked 1ppd for many years     Asthma  She presents for initial evaluation of chronic breathing problems and a history of wheezing. The patient has not been previously diagnosed with asthma. The patient is not currently have symptoms / an exacerbation. The patient has been having episodes for approximately 2 months. Symptoms in previous episodes have included dyspnea, non-productive cough and wheezing, and typically last 2 weeks. Previous episodes have been triggered by smoke. Treatments tried during prior episodes include short-acting inhaled beta-adrenergic agonists, which usually provides some relief of symptoms.           Current Disease Severity  The patient is having daytime symptoms less than or equal to 2 days per week. The patient is having daytime symptoms less than or equal to 2 times per month. The patient is using short-acting beta agonists for symptom control less than or equal to 2 days per week. She has exacerbations requiring oral systemic corticosteroids 0 times per year. Current limitations in activity from asthma: none. Number of days of school or work missed in the last month: not applicable. Number of urgent/emergent visit in last year: 0.  The patient is not using a spacer with MDIs. Her best peak flow rate is na. She is not monitoring peak flow rates     Past Medical History:   Diagnosis Date    Arthritis     hands    Borderline glaucoma     COPD (chronic obstructive pulmonary disease)     pt states she does not have copd    Diabetes mellitus, type 2     Gastritis     Gastritis     upper GI 2/2017    Hydradenitis     Hyperlipidemia     Hypertension     Insomnia     Migraines 02/01/2000    Morbid obesity due to excess calories  2016    Nasal septum perforation     Obesity     Pneumonia     Sleep apnea     SVT (supraventricular tachycardia) 2013    Type 2 diabetes mellitus     Type II or unspecified type diabetes mellitus without mention of complication, not stated as uncontrolled 2013     Past Surgical History:   Procedure Laterality Date    AXILLARY HIDRADENITIS EXCISION      BREAST LUMPECTOMY Bilateral 1998    Benign    CARPAL TUNNEL RELEASE      bilateral    CATARACT EXTRACTION Bilateral     OU     SECTION  1979    CHOLECYSTECTOMY  2014    CYST REMOVAL  2015    sebaceous cyst removed from face    gastric sleeve  2017    Dr. Watson    KNEE SURGERY Right     TONSILLECTOMY, ADENOIDECTOMY  1980s    TRIGGER FINGER RELEASE Right 2015    Dr. Pedraza     Social History     Social History    Marital status:      Spouse name: N/A    Number of children: 1    Years of education: N/A     Occupational History     aid      Social History Main Topics    Smoking status: Former Smoker     Packs/day: 0.50     Years: 30.00     Types: Cigarettes     Start date: 1970     Quit date: 2012    Smokeless tobacco: Never Used    Alcohol use Yes      Comment: rarely // wine  No alcohol prior to surgery    Drug use: No    Sexual activity: Not Currently     Partners: Male     Other Topics Concern    Not on file     Social History Narrative    Single, part-time teacher. Masters degree biology.      Review of Systems   Constitutional: Negative for fever and fatigue.   HENT: Negative for postnasal drip and rhinorrhea.    Eyes: Negative for redness and itching.   Respiratory: Negative for cough, shortness of breath, wheezing, dyspnea on extertion and Paroxysmal Nocturnal Dyspnea.    Cardiovascular: Negative for chest pain.   Genitourinary: Negative for difficulty urinating and hematuria.   Endocrine: Negative for polyphagia, cold intolerance and heat intolerance.     Musculoskeletal: Negative for arthralgias.   Skin: Negative for rash.   Gastrointestinal: Negative for nausea, vomiting, abdominal pain and abdominal distention.   Neurological: Negative for dizziness and headaches.   Hematological: Negative for adenopathy. Does not bruise/bleed easily and no excessive bruising.   Psychiatric/Behavioral: The patient is not nervous/anxious.        Objective:      Physical Exam   Constitutional: She is oriented to person, place, and time. She appears well-developed and well-nourished.   HENT:   Head: Normocephalic and atraumatic.   Eyes: Conjunctivae are normal. Pupils are equal, round, and reactive to light.   Neck: Neck supple. No JVD present. No tracheal deviation present. No thyromegaly present.   Cardiovascular: Normal rate, regular rhythm and normal heart sounds.    Pulmonary/Chest: Effort normal and breath sounds normal. No respiratory distress. She has no wheezes. She has no rales. She exhibits no tenderness.   Abdominal: Soft. Bowel sounds are normal.   Musculoskeletal: Normal range of motion. She exhibits no edema.   Lymphadenopathy:     She has no cervical adenopathy.   Neurological: She is alert and oriented to person, place, and time.   Skin: Skin is warm and dry.   Nursing note and vitals reviewed.    Personal Diagnostic Review  CT of chest performed on 10/26/2017 without contrast revealed pulmonary nodule    .  Radiology Result      Name:   :  Patient MRN:   Cassandra Campos 1950 [id number]   Account Number: Room & Bed Accession Number:   [id number]   [id number]   Authorizing Physician: Patient Class: Diagnosis:   Nixon MLilia Mcdermott OP- Outpatient Diagnostic Testing Abnormal CXR [R93.8 (ICD-10-CM)]   Procedure:  Exam Date: Reason for Exam:   CT Chest Without Contrast 10/26/2017 None Specified          RESULTS:  Comparison:  None.     Technique:  CT of the chest was performed withoutintravenous contrast material.     Findings: There is mild subpleural reticulation  and pleural-based nodularity in the lungs bilaterally, more pronounced in the upper lung zones and consistent with nonspecific fibrotic change.  An 11 mm noncalcified nodule is identified in the posterior left lower lobe which contacts the pleural surface.  Scattered tiny pulmonary micronodules are present along the fissures bilaterally.  No alveolar consolidation or pleural effusion.     There are multiple subcentimeter in short axis lymph nodes in the mediastinum and axillae bilaterally.  No definite pathologic lymphadenopathy.  Aortic and coronary arterial calcifications are noted.  No pericardial effusion.     The gallbladder is surgically absent.  Post surgical changes in the stomach.  Included upper abdominal structures are otherwise within normal limits.     There is multilevel degenerative change in the spine.  IMPRESSION:         1.  Indeterminate 11 mm noncalcified nodule left lower lobe.  PET/CT scan recommended for further evaluation.     2.  Mild reticulonodular fibrotic changes and pleural parenchymal scarring in the lungs bilaterally.     3.  Remainder as above.        Electronically signed by: SERGEI DAVIES MD  Date:                                            10/26/17  Time:                                           13:35           Signed By:  Sergei Davies MD on 10/26/2017  1:36 PM              No flowsheet data found.      Assessment:       1. Pulmonary nodule    2. Mild intermittent asthma, unspecified whether complicated        Outpatient Encounter Prescriptions as of 10/26/2017   Medication Sig Dispense Refill    ACCU-CHEK FASTCLIX Misc USE ONE TIME DAILY 100 each 3    ACCU-CHEK SMARTVIEW TEST STRIP Strp USE TO TEST ONCE DAILY 100 strip 3    albuterol 90 mcg/actuation inhaler Inhale 2 puffs into the lungs every 4 to 6 hours as needed for Wheezing. 1 Inhaler 11    amitriptyline (ELAVIL) 100 MG tablet TAKE 1 TABLET NIGHTLY 90 tablet 3    b complex vitamins tablet Take 1 tablet by mouth  every evening.       blood-glucose meter kit Use as instructed 1 each 0    calcium citrate (CALCITRATE) 200 mg (950 mg) tablet Take 1 tablet by mouth once daily. Ca 630 mg/Vit D 500IU - Pt takes 1 tab three times daily      cyanocobalamin, vitamin B-12, (VITAMIN B-12) 50 mcg tablet Take 50 mcg by mouth once daily.      eszopiclone 3 mg Tab TAKE 1 TABLET EVERY NIGHT AS NEEDED 90 tablet 0    ferrous sulfate 325 (65 FE) MG EC tablet Take 18 mg by mouth once daily.       metformin (GLUCOPHAGE-XR) 500 MG 24 hr tablet TAKE 3 TABLETS ONE TIME DAILY 270 tablet 0    metoprolol succinate (TOPROL-XL) 50 MG 24 hr tablet Take 1 tablet (50 mg total) by mouth once daily. (Patient taking differently: Take 50 mg by mouth every evening. ) 90 tablet 1    multivitamin (ONE DAILY MULTIVITAMIN) per tablet Take 1 tablet by mouth 2 (two) times daily.       nabumetone (RELAFEN) 500 MG tablet Take 1 tablet (500 mg total) by mouth once daily. 30 tablet 2    omeprazole (PRILOSEC) 20 MG capsule Take 1 capsule (20 mg total) by mouth once daily. (Patient taking differently: Take 20 mg by mouth every evening. ) 90 capsule 1    pravastatin (PRAVACHOL) 40 MG tablet TAKE 1 TABLET ONE TIME DAILY 90 tablet 1     No facility-administered encounter medications on file as of 10/26/2017.      Orders Placed This Encounter   Procedures    X-Ray Chest PA And Lateral     Standing Status:   Future     Standing Expiration Date:   4/26/2019     Order Specific Question:   Reason for Exam:     Answer:   SOB    Spirometry with/without bronchodilator     Standing Status:   Future     Standing Expiration Date:   10/26/2018     Plan:       Requested Prescriptions      No prescriptions requested or ordered in this encounter     Pulmonary nodule    Mild intermittent asthma, unspecified whether complicated  -     Spirometry with/without bronchodilator; Future; Expected date: 04/28/2018  -     X-Ray Chest PA And Lateral; Future; Expected date: 10/26/2017            Return in about 1 year (around 10/26/2018) for ami and cxr.    MEDICAL DECISION MAKING: Moderate to high complexity.  Overall, the multiple problems listed are of moderate to high severity that may impact quality of life and activities of daily living. Side effects of medications, treatment plan as well as options and alternatives reviewed and discussed with patient. There was counseling of patient concerning these issues.    Total time spent in face to face counseling and coordination of care - 40  minutes over 50% of time was used in discussion of prognosis, risks, benefits of treatment, instructions and compliance with regimen . Discussion with other physicians or health care providers (DME, NP, pharmacy, respiratory therapy) occurred.

## 2017-11-03 ENCOUNTER — TELEPHONE (OUTPATIENT)
Dept: RADIOLOGY | Facility: HOSPITAL | Age: 67
End: 2017-11-03

## 2017-11-06 ENCOUNTER — OFFICE VISIT (OUTPATIENT)
Dept: PULMONOLOGY | Facility: CLINIC | Age: 67
End: 2017-11-06
Payer: MEDICARE

## 2017-11-06 ENCOUNTER — HOSPITAL ENCOUNTER (OUTPATIENT)
Dept: RADIOLOGY | Facility: HOSPITAL | Age: 67
Discharge: HOME | End: 2017-11-06
Attending: INTERNAL MEDICINE
Payer: MEDICARE

## 2017-11-06 VITALS
HEART RATE: 104 BPM | SYSTOLIC BLOOD PRESSURE: 110 MMHG | WEIGHT: 185.19 LBS | RESPIRATION RATE: 18 BRPM | DIASTOLIC BLOOD PRESSURE: 68 MMHG | OXYGEN SATURATION: 95 % | HEIGHT: 63 IN | BODY MASS INDEX: 32.81 KG/M2

## 2017-11-06 DIAGNOSIS — R91.1 PULMONARY NODULE: ICD-10-CM

## 2017-11-06 DIAGNOSIS — R91.1 PULMONARY NODULE, LEFT: Primary | ICD-10-CM

## 2017-11-06 LAB
PRE FEV1 FVC: 91.8 % (ref 67.11–88.47)
PRE FEV1: 1.52 L (ref 1.21–2.39)
PRE FVC: 1.66 L (ref 1.63–3.01)
PRE PEF: 5.05 L/S (ref 2.91–6.79)

## 2017-11-06 PROCEDURE — 78815 PET IMAGE W/CT SKULL-THIGH: CPT | Mod: 26,PI,, | Performed by: RADIOLOGY

## 2017-11-06 PROCEDURE — A9552 F18 FDG: HCPCS | Mod: PO

## 2017-11-06 PROCEDURE — 99215 OFFICE O/P EST HI 40 MIN: CPT | Mod: S$GLB,,, | Performed by: INTERNAL MEDICINE

## 2017-11-06 PROCEDURE — 99999 PR PBB SHADOW E&M-EST. PATIENT-LVL IV: CPT | Mod: PBBFAC,,, | Performed by: INTERNAL MEDICINE

## 2017-11-06 NOTE — PROGRESS NOTES
Subjective:       Patient ID: Cassandra Campos is a 67 y.o. female.    Chief Complaint: She       Calcified Granuloma of lung (rev PET)    HPI       Lung Nodule  She presents for evaluation and treatment of a lung nodule. The patient had an abnormal imaging study but reports experiencing no current or past pulmonary symptoms. The patient denies other associated symptoms. She quit smoking approximately 6 years ago. The patient has a known exposure to tuberculosis.  Negative TB skin test in past. The patient does not have a history of cancer.      Exercises regularly  Intermittent cough and chest congestion  Sinus congestion - hole in nose from snorting cocaine  Smoked 1ppd for many years         Past Medical History:   Diagnosis Date    Arthritis     hands    Borderline glaucoma     COPD (chronic obstructive pulmonary disease)     pt states she does not have copd    Diabetes mellitus, type 2     Gastritis     Gastritis     upper GI 2017    Hydradenitis     Hyperlipidemia     Hypertension     Insomnia     Migraines 2000    Morbid obesity due to excess calories 2016    Nasal septum perforation     Obesity     Pneumonia     Sleep apnea     SVT (supraventricular tachycardia) 2013    Type 2 diabetes mellitus     Type II or unspecified type diabetes mellitus without mention of complication, not stated as uncontrolled 2013     Past Surgical History:   Procedure Laterality Date    AXILLARY HIDRADENITIS EXCISION      BREAST LUMPECTOMY Bilateral 1998    Benign    CARPAL TUNNEL RELEASE      bilateral    CATARACT EXTRACTION Bilateral     OU     SECTION  1979    CHOLECYSTECTOMY  2014    CYST REMOVAL  2015    sebaceous cyst removed from face    gastric sleeve  2017    Dr. Watson    KNEE SURGERY Right     TONSILLECTOMY, ADENOIDECTOMY  1980s    TRIGGER FINGER RELEASE Right 2015    Dr. Pedraza     Social History     Social History    Marital status:       Spouse name: N/A    Number of children: 1    Years of education: N/A     Occupational History     aid      Social History Main Topics    Smoking status: Former Smoker     Packs/day: 0.50     Years: 30.00     Types: Cigarettes     Start date: 1/1/1970     Quit date: 1/1/2012    Smokeless tobacco: Never Used    Alcohol use No      Comment: rarely // wine  No alcohol prior to surgery    Drug use: No    Sexual activity: Not Currently     Partners: Male     Other Topics Concern    Not on file     Social History Narrative    Single, part-time teacher. Masters degree biology.      Review of Systems   Constitutional: Negative for fever and fatigue.   HENT: Positive for postnasal drip. Negative for rhinorrhea.    Eyes: Negative for redness and itching.   Respiratory: Positive for dyspnea on extertion. Negative for cough, shortness of breath, wheezing and Paroxysmal Nocturnal Dyspnea.    Cardiovascular: Negative for chest pain.   Genitourinary: Negative for difficulty urinating and hematuria.   Endocrine: Negative for polyphagia, cold intolerance and heat intolerance.    Musculoskeletal: Negative for arthralgias.   Skin: Negative for rash.   Gastrointestinal: Negative for nausea, vomiting, abdominal pain and abdominal distention.   Neurological: Negative for dizziness and headaches.   Hematological: Negative for adenopathy. Does not bruise/bleed easily and no excessive bruising.   Psychiatric/Behavioral: The patient is not nervous/anxious.        Objective:      Physical Exam   Constitutional: She is oriented to person, place, and time. She appears well-developed and well-nourished.   HENT:   Head: Normocephalic and atraumatic.   Eyes: Conjunctivae are normal. Pupils are equal, round, and reactive to light.   Neck: Neck supple. No JVD present. No tracheal deviation present. No thyromegaly present.   Cardiovascular: Normal rate, regular rhythm and normal heart sounds.    Pulmonary/Chest:  Effort normal. No respiratory distress. She has decreased breath sounds. She has no wheezes. She has no rales. She exhibits no tenderness.   Abdominal: Soft. Bowel sounds are normal.   Musculoskeletal: Normal range of motion. She exhibits no edema.   Lymphadenopathy:     She has no cervical adenopathy.   Neurological: She is alert and oriented to person, place, and time.   Skin: Skin is warm and dry.   Nursing note and vitals reviewed.    Personal Diagnostic Review  PET:CT of chest performed revealed :  Radiology Result      Name:   :  Patient MRN:   Cassandra Campos 1950 [id number]   Account Number: Room & Bed Accession Number:   [id number]   [id number]   Authorizing Physician: Patient Class: Diagnosis:   Nixon Mcdermott OP- Outpatient Diagnostic Testing Pulmonary nodule [R91.1 (ICD-10-CM)]   Procedure:  Exam Date: Reason for Exam:   NM PET CT Routine Skull to Mid Thigh 2017 Cancer suspected          RESULTS:  Comparison: CT chest from 10/26/2017.     Clinical History: Solitary pulmonary nodule, initial treatment strategy     Technique:  Segmented attenuation corrected 3-D PET imaging was obtained from the skull base through the mid thighs utilizing 11.85 mCi F-18-FDG.  Noncontrast CT imaging was performed for attenuation correction, diagnosis, and anatomical fusion with emission PET images.     Findings:     Head/neck: There is normal physiologic uptake noted within the visualized brain parenchyma. No FDG avid adenopathy within the neck.      Chest: The noncalcified pulmonary nodule within the left lower lobe near the lung base demonstrates no FDG avidity. No FDG avid mediastinal, hilar or axillary lymph nodes.     Abdomen/Pelvis: Normal physiologic uptake noted within the liver, spleen, urinary tract, and bowel.  Postoperative changes associated with the stomach.  The gallbladder is surgically absent.  A fat filled periumbilical hernia is noted.  No FDG avid osseus lesions identified.  IMPRESSION:          1. No FDG avidity associated with the pulmonary nodules noted within the left lower lobe.  Continued followup with CT is recommended.      2.  Remaining findings as discussed above.        Electronically signed by: SOLOMON FOURNIER D.O.  Date:                                            11/06/17  Time:                                           14:31           Signed By:  Solomon Fournier DO on 11/6/2017  2:32 PM       No flowsheet data found.      Assessment:       1. Pulmonary nodule, left        Outpatient Encounter Prescriptions as of 11/6/2017   Medication Sig Dispense Refill    ACCU-CHEK FASTCLIX Misc USE ONE TIME DAILY 100 each 3    ACCU-CHEK SMARTVIEW TEST STRIP Strp USE TO TEST ONCE DAILY 100 strip 3    albuterol 90 mcg/actuation inhaler Inhale 2 puffs into the lungs every 4 to 6 hours as needed for Wheezing. 1 Inhaler 11    amitriptyline (ELAVIL) 100 MG tablet TAKE 1 TABLET NIGHTLY 90 tablet 3    b complex vitamins tablet Take 1 tablet by mouth every evening.       blood-glucose meter kit Use as instructed 1 each 0    calcium citrate (CALCITRATE) 200 mg (950 mg) tablet Take 1 tablet by mouth once daily. Ca 630 mg/Vit D 500IU - Pt takes 1 tab three times daily      cyanocobalamin, vitamin B-12, (VITAMIN B-12) 50 mcg tablet Take 50 mcg by mouth once daily.      eszopiclone 3 mg Tab TAKE 1 TABLET EVERY NIGHT AS NEEDED 90 tablet 0    ferrous sulfate 325 (65 FE) MG EC tablet Take 18 mg by mouth once daily.       metformin (GLUCOPHAGE-XR) 500 MG 24 hr tablet TAKE 3 TABLETS ONE TIME DAILY 270 tablet 0    metoprolol succinate (TOPROL-XL) 50 MG 24 hr tablet Take 1 tablet (50 mg total) by mouth once daily. (Patient taking differently: Take 50 mg by mouth every evening. ) 90 tablet 1    multivitamin (ONE DAILY MULTIVITAMIN) per tablet Take 1 tablet by mouth 2 (two) times daily.       omeprazole (PRILOSEC) 20 MG capsule Take 1 capsule (20 mg total) by mouth once daily. (Patient taking differently:  Take 20 mg by mouth every evening. ) 90 capsule 1    pravastatin (PRAVACHOL) 40 MG tablet TAKE 1 TABLET ONE TIME DAILY 90 tablet 1    [DISCONTINUED] nabumetone (RELAFEN) 500 MG tablet Take 1 tablet (500 mg total) by mouth once daily. 30 tablet 2     No facility-administered encounter medications on file as of 11/6/2017.      Orders Placed This Encounter   Procedures    CT Chest Without Contrast     Standing Status:   Future     Standing Expiration Date:   11/6/2018     Order Specific Question:   May the Radiologist modify the order per protocol to meet the clinical needs of the patient?     Answer:   Yes     Plan:       Requested Prescriptions      No prescriptions requested or ordered in this encounter     Pulmonary nodule, left  -     CT Chest Without Contrast; Future; Expected date: 11/06/2017           Return in about 1 year (around 11/6/2018) for CT scan on retrun.    MEDICAL DECISION MAKING: Moderate to high complexity.  Overall, the multiple problems listed are of moderate to high severity that may impact quality of life and activities of daily living. Side effects of medications, treatment plan as well as options and alternatives reviewed and discussed with patient. There was counseling of patient concerning these issues.    Total time spent in face to face counseling and coordination of care - 40  minutes over 50% of time was used in discussion of prognosis, risks, benefits of treatment, instructions and compliance with regimen . Discussion with other physicians or health care providers (DME, NP, pharmacy, respiratory therapy) occurred.

## 2017-11-21 ENCOUNTER — HOSPITAL ENCOUNTER (OUTPATIENT)
Dept: RADIOLOGY | Facility: HOSPITAL | Age: 67
Discharge: HOME | End: 2017-11-21
Attending: FAMILY MEDICINE
Payer: MEDICARE

## 2017-11-21 ENCOUNTER — OFFICE VISIT (OUTPATIENT)
Dept: SURGERY | Facility: CLINIC | Age: 67
End: 2017-11-21
Payer: MEDICARE

## 2017-11-21 VITALS
WEIGHT: 183.19 LBS | DIASTOLIC BLOOD PRESSURE: 67 MMHG | HEART RATE: 94 BPM | SYSTOLIC BLOOD PRESSURE: 98 MMHG | TEMPERATURE: 98 F | BODY MASS INDEX: 32.45 KG/M2

## 2017-11-21 DIAGNOSIS — Z98.84 S/P LAPAROSCOPIC SLEEVE GASTRECTOMY: Primary | ICD-10-CM

## 2017-11-21 DIAGNOSIS — Z12.31 ENCOUNTER FOR SCREENING MAMMOGRAM FOR BREAST CANCER: ICD-10-CM

## 2017-11-21 DIAGNOSIS — Z98.890 POST-OPERATIVE STATE: ICD-10-CM

## 2017-11-21 PROCEDURE — 77067 SCR MAMMO BI INCL CAD: CPT | Mod: 26,,, | Performed by: RADIOLOGY

## 2017-11-21 PROCEDURE — 99999 PR PBB SHADOW E&M-EST. PATIENT-LVL III: CPT | Mod: PBBFAC,,, | Performed by: PHYSICIAN ASSISTANT

## 2017-11-21 PROCEDURE — 77063 BREAST TOMOSYNTHESIS BI: CPT | Mod: 26,,, | Performed by: RADIOLOGY

## 2017-11-21 PROCEDURE — 77067 SCR MAMMO BI INCL CAD: CPT | Mod: TC

## 2017-11-21 PROCEDURE — 99211 OFF/OP EST MAY X REQ PHY/QHP: CPT | Mod: S$GLB,,, | Performed by: PHYSICIAN ASSISTANT

## 2017-11-21 NOTE — PROGRESS NOTES
Cassandra Campos is status post sleeve gastrectomy and presents today for follow-up care.  Her weight has been fairly stable but she reports eating more because she stopped taking her vitamin. She is seeing Dr. Briggs, GI regarding elevated transaminases.     PE: Abdomen is soft, non-tender, non-distended.  Incisions clean, dry, and intact.      A/P:  Normal post-operative course.  CMP today.   Recommend dietician follow up

## 2017-11-27 ENCOUNTER — PATIENT MESSAGE (OUTPATIENT)
Dept: DIABETES | Facility: CLINIC | Age: 67
End: 2017-11-27

## 2017-11-27 DIAGNOSIS — G47.00 INSOMNIA, UNSPECIFIED TYPE: ICD-10-CM

## 2017-11-27 RX ORDER — OMEPRAZOLE 20 MG/1
CAPSULE, DELAYED RELEASE ORAL
Qty: 90 CAPSULE | Refills: 1 | Status: SHIPPED | OUTPATIENT
Start: 2017-11-27 | End: 2018-06-18 | Stop reason: SDUPTHER

## 2017-11-28 RX ORDER — ESZOPICLONE 3 MG/1
TABLET, FILM COATED ORAL
Qty: 90 TABLET | Refills: 0 | Status: SHIPPED | OUTPATIENT
Start: 2017-11-28 | End: 2018-02-05 | Stop reason: SDUPTHER

## 2018-01-09 DIAGNOSIS — E11.9 DM II (DIABETES MELLITUS, TYPE II), CONTROLLED: ICD-10-CM

## 2018-01-09 RX ORDER — AMITRIPTYLINE HYDROCHLORIDE 100 MG/1
TABLET ORAL
Qty: 90 TABLET | Refills: 3 | Status: SHIPPED | OUTPATIENT
Start: 2018-01-09 | End: 2018-10-23 | Stop reason: SDUPTHER

## 2018-01-09 RX ORDER — BLOOD SUGAR DIAGNOSTIC
STRIP MISCELLANEOUS
Qty: 100 STRIP | Refills: 3 | Status: SHIPPED | OUTPATIENT
Start: 2018-01-09 | End: 2019-01-02 | Stop reason: SDUPTHER

## 2018-01-15 ENCOUNTER — PATIENT MESSAGE (OUTPATIENT)
Dept: DIABETES | Facility: CLINIC | Age: 68
End: 2018-01-15

## 2018-01-29 DIAGNOSIS — I15.9 SECONDARY HYPERTENSION: ICD-10-CM

## 2018-01-30 RX ORDER — METOPROLOL SUCCINATE 50 MG/1
TABLET, EXTENDED RELEASE ORAL
Qty: 90 TABLET | Refills: 1 | Status: SHIPPED | OUTPATIENT
Start: 2018-01-30 | End: 2018-07-18 | Stop reason: SDUPTHER

## 2018-01-30 RX ORDER — PRAVASTATIN SODIUM 40 MG/1
TABLET ORAL
Qty: 90 TABLET | Refills: 1 | Status: SHIPPED | OUTPATIENT
Start: 2018-01-30 | End: 2018-08-03 | Stop reason: SDUPTHER

## 2018-01-31 ENCOUNTER — PATIENT MESSAGE (OUTPATIENT)
Dept: INTERNAL MEDICINE | Facility: CLINIC | Age: 68
End: 2018-01-31

## 2018-02-01 ENCOUNTER — PATIENT MESSAGE (OUTPATIENT)
Dept: INTERNAL MEDICINE | Facility: CLINIC | Age: 68
End: 2018-02-01

## 2018-02-05 ENCOUNTER — PATIENT OUTREACH (OUTPATIENT)
Dept: ADMINISTRATIVE | Facility: HOSPITAL | Age: 68
End: 2018-02-05

## 2018-02-05 ENCOUNTER — PATIENT MESSAGE (OUTPATIENT)
Dept: INTERNAL MEDICINE | Facility: CLINIC | Age: 68
End: 2018-02-05

## 2018-02-05 DIAGNOSIS — G47.00 INSOMNIA, UNSPECIFIED TYPE: ICD-10-CM

## 2018-02-05 NOTE — PROGRESS NOTES
Last documented 2017 Medication reconciliation Post d/c has been sent to [facility] as attestation documentation for MEDICATION RECONCILITION.(DOS 02/17/17 PER [facility] CALL) Measure has been met.

## 2018-02-07 RX ORDER — ESZOPICLONE 3 MG/1
TABLET, FILM COATED ORAL
Qty: 90 TABLET | Refills: 0 | Status: SHIPPED | OUTPATIENT
Start: 2018-02-07 | End: 2018-04-11 | Stop reason: SDUPTHER

## 2018-02-12 ENCOUNTER — OFFICE VISIT (OUTPATIENT)
Dept: INTERNAL MEDICINE | Facility: CLINIC | Age: 68
End: 2018-02-12
Payer: MEDICARE

## 2018-02-12 VITALS
OXYGEN SATURATION: 93 % | TEMPERATURE: 98 F | HEART RATE: 86 BPM | WEIGHT: 190.94 LBS | BODY MASS INDEX: 33.83 KG/M2 | DIASTOLIC BLOOD PRESSURE: 82 MMHG | HEIGHT: 63 IN | SYSTOLIC BLOOD PRESSURE: 129 MMHG

## 2018-02-12 DIAGNOSIS — E78.5 HYPERLIPIDEMIA ASSOCIATED WITH TYPE 2 DIABETES MELLITUS: ICD-10-CM

## 2018-02-12 DIAGNOSIS — G47.00 INSOMNIA, UNSPECIFIED TYPE: ICD-10-CM

## 2018-02-12 DIAGNOSIS — K90.9 IMPAIRED INTESTINAL ABSORPTION: ICD-10-CM

## 2018-02-12 DIAGNOSIS — R74.8 ELEVATED LIVER ENZYMES: ICD-10-CM

## 2018-02-12 DIAGNOSIS — E11.9 CONTROLLED TYPE 2 DIABETES MELLITUS WITHOUT COMPLICATION, WITHOUT LONG-TERM CURRENT USE OF INSULIN: Primary | ICD-10-CM

## 2018-02-12 DIAGNOSIS — R74.8 ABNORMAL LIVER ENZYMES: ICD-10-CM

## 2018-02-12 DIAGNOSIS — I47.10 PAROXYSMAL SVT (SUPRAVENTRICULAR TACHYCARDIA): ICD-10-CM

## 2018-02-12 DIAGNOSIS — E11.69 HYPERLIPIDEMIA ASSOCIATED WITH TYPE 2 DIABETES MELLITUS: ICD-10-CM

## 2018-02-12 DIAGNOSIS — I77.1 TORTUOUS AORTA: ICD-10-CM

## 2018-02-12 DIAGNOSIS — J84.10 CALCIFIED GRANULOMA OF LUNG: ICD-10-CM

## 2018-02-12 LAB
ALBUMIN SERPL BCP-MCNC: 3.6 G/DL
ALP SERPL-CCNC: 111 U/L
ALT SERPL W/O P-5'-P-CCNC: 38 U/L
ANION GAP SERPL CALC-SCNC: 8 MMOL/L
AST SERPL-CCNC: 30 U/L
BILIRUB SERPL-MCNC: 0.4 MG/DL
BUN SERPL-MCNC: 9 MG/DL
CALCIUM SERPL-MCNC: 9.6 MG/DL
CHLORIDE SERPL-SCNC: 106 MMOL/L
CO2 SERPL-SCNC: 26 MMOL/L
CREAT SERPL-MCNC: 0.7 MG/DL
EST. GFR  (AFRICAN AMERICAN): >60 ML/MIN/1.73 M^2
EST. GFR  (NON AFRICAN AMERICAN): >60 ML/MIN/1.73 M^2
ESTIMATED AVG GLUCOSE: 105 MG/DL
GLUCOSE SERPL-MCNC: 96 MG/DL
HBA1C MFR BLD HPLC: 5.3 %
POTASSIUM SERPL-SCNC: 4.4 MMOL/L
PROT SERPL-MCNC: 8.4 G/DL
SODIUM SERPL-SCNC: 140 MMOL/L

## 2018-02-12 PROCEDURE — 99999 PR PBB SHADOW E&M-EST. PATIENT-LVL III: CPT | Mod: PBBFAC,,, | Performed by: FAMILY MEDICINE

## 2018-02-12 PROCEDURE — 99214 OFFICE O/P EST MOD 30 MIN: CPT | Mod: S$GLB,,, | Performed by: FAMILY MEDICINE

## 2018-02-12 PROCEDURE — 1126F AMNT PAIN NOTED NONE PRSNT: CPT | Mod: S$GLB,,, | Performed by: FAMILY MEDICINE

## 2018-02-12 PROCEDURE — 99499 UNLISTED E&M SERVICE: CPT | Mod: S$GLB,,, | Performed by: FAMILY MEDICINE

## 2018-02-12 PROCEDURE — 83036 HEMOGLOBIN GLYCOSYLATED A1C: CPT

## 2018-02-12 PROCEDURE — 3008F BODY MASS INDEX DOCD: CPT | Mod: S$GLB,,, | Performed by: FAMILY MEDICINE

## 2018-02-12 PROCEDURE — 80053 COMPREHEN METABOLIC PANEL: CPT

## 2018-02-12 PROCEDURE — 1159F MED LIST DOCD IN RCRD: CPT | Mod: S$GLB,,, | Performed by: FAMILY MEDICINE

## 2018-02-12 RX ORDER — METFORMIN HYDROCHLORIDE 500 MG/1
1000 TABLET, EXTENDED RELEASE ORAL DAILY
Qty: 180 TABLET | Refills: 3 | Status: SHIPPED | OUTPATIENT
Start: 2018-02-12 | End: 2019-01-02 | Stop reason: SDUPTHER

## 2018-02-12 NOTE — PROGRESS NOTES
Subjective:       Patient ID: Cassandra Campos is a 67 y.o. female.    Chief Complaint: Diabetes; Extremity Weakness; and dry mouth    Here for 6 month recheck DM2 - labs due. revewed meds and most recent labs. She is taking 2 metformin daily and her BS checks are good per her report.  Lab Results       Component                Value               Date                       WBC                      8.35                08/01/2017                 HGB                      13.9                08/01/2017                 HCT                      40.1                08/01/2017                 PLT                      293                 08/01/2017                 CHOL                     169                 08/01/2017                 TRIG                     165 (H)             08/01/2017                 HDL                      40                  08/01/2017                 LDLCALC                  96.0                08/01/2017                 ALT                      89 (H)              11/21/2017                 AST                      48 (H)              11/21/2017                 NA                       140                 11/21/2017                 K                        4.4                 11/21/2017                 CL                       103                 11/21/2017                 CALCIUM                  10.2                11/21/2017                 CREATININE               0.8                 11/21/2017                 BUN                      20                  11/21/2017                 CO2                      30 (H)              11/21/2017                 TSH                      2.386               11/28/2016                 GLU                      91                  11/21/2017                 ESTGFRAFRICA             >60.0               11/21/2017                 EGFRNONAA                >60.0               11/21/2017                 HGBA1C                   5.5                 08/01/2017                  MICALBCREAT              12.9                08/01/2017            Obtain A1C and CMP today as prev ordered.  Reviewed prior chest x-ray results and note CT exam ordered.  Her next follow-up with pulmonary is in October.  Tortuous aorta noted.      Diabetes   She presents for her follow-up diabetic visit. She has type 2 diabetes mellitus. No MedicAlert identification noted. The initial diagnosis of diabetes was made 5 years ago. Her disease course has been stable. Hypoglycemia symptoms include hunger. Pertinent negatives for hypoglycemia include no confusion, dizziness, headaches, mood changes, nervousness/anxiousness, pallor, seizures, sleepiness, speech difficulty, sweats or tremors. Associated symptoms include blurred vision, polyphagia and polyuria. Pertinent negatives for diabetes include no chest pain, no fatigue, no foot paresthesias, no foot ulcerations, no polydipsia, no visual change, no weakness and no weight loss. Hypoglycemia complications include nocturnal hypoglycemia. Pertinent negatives for hypoglycemia complications include no blackouts, no hospitalization, no required assistance and no required glucagon injection. Symptoms are stable. Pertinent negatives for diabetic complications include no autonomic neuropathy, CVA, heart disease, impotence, nephropathy, peripheral neuropathy, PVD or retinopathy. There are no known risk factors for coronary artery disease. Current diabetic treatment includes diet and oral agent (monotherapy). Her weight is fluctuating minimally. She is following a diabetic diet. Meal planning includes avoidance of concentrated sweets, calorie counting and carbohydrate counting. She has had a previous visit with a dietitian. She participates in exercise daily. She monitors blood glucose at home 3-4 x per week. She monitors urine at home <1 x per month. Blood glucose monitoring compliance is excellent. There is no change in her home blood glucose trend. Her breakfast  blood glucose range is generally  mg/dl. An ACE inhibitor/angiotensin II receptor blocker is not being taken (She is on a beta blocker for PSVT). She sees a podiatrist.Eye exam is current.     Review of Systems   Constitutional: Negative for fatigue and weight loss.   HENT: Negative for congestion and rhinorrhea.    Eyes: Positive for blurred vision.   Respiratory: Positive for cough (some at night).    Cardiovascular: Negative for chest pain.   Endocrine: Positive for polyphagia and polyuria. Negative for polydipsia.   Genitourinary: Negative for impotence.   Musculoskeletal: Positive for extremity weakness.   Skin: Negative for pallor.   Neurological: Negative for dizziness, tremors, seizures, speech difficulty, weakness and headaches.   Psychiatric/Behavioral: Negative for confusion. The patient is not nervous/anxious.        Objective:      Physical Exam   Constitutional: She is oriented to person, place, and time. She appears well-developed and well-nourished.   HENT:   Head: Normocephalic and atraumatic.   Right Ear: External ear normal.   Left Ear: External ear normal.   Mouth/Throat: Oropharynx is clear and moist. No oropharyngeal exudate.   Neck: Normal range of motion. Neck supple.   Cardiovascular: Normal rate, regular rhythm and normal heart sounds.    Pulses:       Dorsalis pedis pulses are 2+ on the right side, and 2+ on the left side.        Posterior tibial pulses are 1+ on the right side, and 1+ on the left side.   Pulmonary/Chest: Effort normal and breath sounds normal.   Abdominal: Soft. Bowel sounds are normal. She exhibits no distension. There is no tenderness.   Musculoskeletal:        Right foot: There is normal range of motion and no deformity.        Left foot: There is normal range of motion and no deformity.   Feet:   Right Foot:   Protective Sensation: 10 sites tested. 10 sites sensed.   Skin Integrity: Negative for ulcer or skin breakdown.   Left Foot:   Protective Sensation: 10  sites tested. 10 sites sensed.   Skin Integrity: Negative for ulcer or skin breakdown.   Neurological: She is alert and oriented to person, place, and time.   Skin: Skin is warm and dry.   Onychomycosis multiple nails B feet   Psychiatric: She has a normal mood and affect. Her behavior is normal.         Assessment/Plan:     1. Controlled type 2 diabetes mellitus without complication, without long-term current use of insulin  Hemoglobin A1c    Comprehensive metabolic panel    metFORMIN (GLUCOPHAGE-XR) 500 MG 24 hr tablet    Hemoglobin A1c    Microalbumin/creatinine urine ratio    Comprehensive metabolic panel   2. Abnormal liver enzymes  Comprehensive metabolic panel    Comprehensive metabolic panel   3. Hyperlipidemia associated with type 2 diabetes mellitus  Lipid panel   4. Insomnia, unspecified type     5. Elevated liver enzymes     6. Impaired intestinal absorption  CBC auto differential    Vitamin B12   7. Paroxysmal SVT (supraventricular tachycardia)     8. Calcified granuloma of lung      excellent control of diabetes on metformin alone.  Lipids acceptable on pravastatin 40 mg daily.  No history of hypertension.  Her blood pressure is 129/82 on metoprolol 50.  Consider Ace in the future.  She will continue to f/u with pulmonary for lung rechecks.  PSVT controlled on BB.  Continue to F/U with GI for elevated LFTS.  She will see podiatry again in May.  Recheck 6 months with labs.

## 2018-02-13 ENCOUNTER — PATIENT MESSAGE (OUTPATIENT)
Dept: INTERNAL MEDICINE | Facility: CLINIC | Age: 68
End: 2018-02-13

## 2018-02-13 ENCOUNTER — PATIENT MESSAGE (OUTPATIENT)
Dept: PODIATRY | Facility: CLINIC | Age: 68
End: 2018-02-13

## 2018-04-11 DIAGNOSIS — G47.00 INSOMNIA, UNSPECIFIED TYPE: ICD-10-CM

## 2018-04-11 RX ORDER — ESZOPICLONE 3 MG/1
TABLET, FILM COATED ORAL
Qty: 90 TABLET | Refills: 0 | Status: SHIPPED | OUTPATIENT
Start: 2018-04-11 | End: 2018-06-26 | Stop reason: SDUPTHER

## 2018-04-12 ENCOUNTER — PES CALL (OUTPATIENT)
Dept: ADMINISTRATIVE | Facility: CLINIC | Age: 68
End: 2018-04-12

## 2018-05-20 ENCOUNTER — OFFICE VISIT (OUTPATIENT)
Dept: URGENT CARE | Facility: CLINIC | Age: 68
End: 2018-05-20
Payer: MEDICARE

## 2018-05-20 VITALS
DIASTOLIC BLOOD PRESSURE: 70 MMHG | BODY MASS INDEX: 35.17 KG/M2 | RESPIRATION RATE: 18 BRPM | SYSTOLIC BLOOD PRESSURE: 110 MMHG | TEMPERATURE: 97 F | HEART RATE: 88 BPM | WEIGHT: 198.5 LBS | OXYGEN SATURATION: 95 % | HEIGHT: 63 IN

## 2018-05-20 DIAGNOSIS — J98.01 BRONCHOSPASM: Primary | ICD-10-CM

## 2018-05-20 DIAGNOSIS — J06.9 VIRAL UPPER RESPIRATORY TRACT INFECTION: ICD-10-CM

## 2018-05-20 PROCEDURE — 99999 PR PBB SHADOW E&M-EST. PATIENT-LVL III: CPT | Mod: PBBFAC,,, | Performed by: FAMILY MEDICINE

## 2018-05-20 PROCEDURE — 3074F SYST BP LT 130 MM HG: CPT | Mod: CPTII,S$GLB,, | Performed by: FAMILY MEDICINE

## 2018-05-20 PROCEDURE — 99214 OFFICE O/P EST MOD 30 MIN: CPT | Mod: 25,S$GLB,, | Performed by: FAMILY MEDICINE

## 2018-05-20 PROCEDURE — 96372 THER/PROPH/DIAG INJ SC/IM: CPT | Mod: 59,S$GLB,, | Performed by: FAMILY MEDICINE

## 2018-05-20 PROCEDURE — 94640 AIRWAY INHALATION TREATMENT: CPT | Mod: S$GLB,,, | Performed by: FAMILY MEDICINE

## 2018-05-20 PROCEDURE — 3078F DIAST BP <80 MM HG: CPT | Mod: CPTII,S$GLB,, | Performed by: FAMILY MEDICINE

## 2018-05-20 RX ORDER — BETAMETHASONE SODIUM PHOSPHATE AND BETAMETHASONE ACETATE 3; 3 MG/ML; MG/ML
6 INJECTION, SUSPENSION INTRA-ARTICULAR; INTRALESIONAL; INTRAMUSCULAR; SOFT TISSUE
Status: COMPLETED | OUTPATIENT
Start: 2018-05-20 | End: 2018-05-20

## 2018-05-20 RX ORDER — METHYLPREDNISOLONE 4 MG/1
TABLET ORAL
Qty: 1 PACKAGE | Refills: 0 | Status: SHIPPED | OUTPATIENT
Start: 2018-05-20 | End: 2018-06-05

## 2018-05-20 RX ORDER — ALBUTEROL SULFATE 0.83 MG/ML
2.5 SOLUTION RESPIRATORY (INHALATION)
Status: COMPLETED | OUTPATIENT
Start: 2018-05-20 | End: 2018-05-20

## 2018-05-20 RX ORDER — PROMETHAZINE HYDROCHLORIDE AND DEXTROMETHORPHAN HYDROBROMIDE 6.25; 15 MG/5ML; MG/5ML
5 SYRUP ORAL NIGHTLY PRN
Qty: 120 ML | Refills: 0 | Status: SHIPPED | OUTPATIENT
Start: 2018-05-20 | End: 2018-06-05

## 2018-05-20 RX ADMIN — ALBUTEROL SULFATE 2.5 MG: 0.83 SOLUTION RESPIRATORY (INHALATION) at 02:05

## 2018-05-20 RX ADMIN — BETAMETHASONE SODIUM PHOSPHATE AND BETAMETHASONE ACETATE 6 MG: 3; 3 INJECTION, SUSPENSION INTRA-ARTICULAR; INTRALESIONAL; INTRAMUSCULAR; SOFT TISSUE at 02:05

## 2018-05-20 NOTE — PROGRESS NOTES
"Subjective:       Patient ID: Cassandra Campos is a 67 y.o. female.    Chief Complaint: Sinus Problem and Cough    /70 (BP Location: Right arm, Patient Position: Sitting, BP Method: Large (Manual))   Pulse 88   Temp 97.4 °F (36.3 °C) (Tympanic)   Resp 18   Ht 5' 3" (1.6 m)   Wt 90.1 kg (198 lb 8.4 oz)   SpO2 95%   BMI 35.17 kg/m²     HPI  Patient presented with congestion, sore throat, cough wheeze shortness of breath, right upper back pain for the past 3 weeks. She used home inhaler once, not too much help.     Review of Systems   Constitutional: Positive for activity change. Negative for chills and fever.   HENT: Positive for congestion, sinus pressure and sore throat. Negative for ear pain, facial swelling, rhinorrhea and sinus pain.    Eyes: Negative.    Respiratory: Positive for cough, shortness of breath and wheezing.    Cardiovascular: Negative.    Gastrointestinal: Negative for diarrhea and nausea.   Musculoskeletal: Positive for back pain.   Neurological: Negative.  Negative for headaches.       Objective:      Physical Exam   Constitutional: She is oriented to person, place, and time. She appears well-developed and well-nourished. No distress.   HENT:   Head: Normocephalic and atraumatic.   Mouth/Throat: Oropharynx is clear and moist. No oropharyngeal exudate.   TM no erythema   Eyes: EOM are normal. Pupils are equal, round, and reactive to light. Right eye exhibits no discharge. Left eye exhibits no discharge.   Neck: Normal range of motion. Neck supple.   Cardiovascular: Regular rhythm and normal heart sounds.    Pulmonary/Chest: Effort normal. No respiratory distress. She has no wheezes. She has no rales.   Tight BS   Musculoskeletal: Normal range of motion.   Lymphadenopathy:     She has no cervical adenopathy.   Neurological: She is alert and oriented to person, place, and time. A cranial nerve deficit is present.   Skin: Skin is warm and dry. She is not diaphoretic.   Nursing note and " vitals reviewed.      Assessment:       1. Bronchospasm    2. Viral upper respiratory tract infection        Plan:     Cassandra was seen today for sinus problem and cough.    Diagnoses and all orders for this visit:    Bronchospasm  -     albuterol nebulizer solution 2.5 mg; Take 3 mLs (2.5 mg total) by nebulization one time.  -     methylPREDNISolone (MEDROL DOSEPACK) 4 mg tablet; use as directed  -     promethazine-dextromethorphan (PROMETHAZINE-DM) 6.25-15 mg/5 mL Syrp; Take 5 mLs by mouth nightly as needed (Cough).  -     betamethasone acetate-betamethasone sodium phosphate injection 6 mg; Inject 1 mL (6 mg total) into the muscle one time.    Viral upper respiratory tract infection      Instructions  1. Use your home albuterol inhaler for wheeze/cough/shortness of breath, 2 puffs every 4-6 hours  2. You received corticosteroid injection today. Start medrol dose pack tomorrow  3. Take Rx cough syrup at night  4. Follow up with PCP if not better in a few days  Discussed with pt/family all information and results pertaining to this visit. Discussed diagnosis and plan of treatment.  All questions and concerns were addressed at this time. Pt/family expresses understanding of information and instructions.  Care and follow up instruction provided.

## 2018-05-20 NOTE — PROGRESS NOTES
Per written order by Dr. Pelaez  Albuterol .083%/3ml given per inhalation, Patient tolerated well, Pulse ox before 97% , Pulse ox after 97%,  Per written order Dr. Pelaez , Celestone 6 mg/ml was given to the Right  VG, advised to wait 15-20 min, to observe for S/S of adverse reaction, AVS given and read to patient, No further questions at this time.

## 2018-05-20 NOTE — PATIENT INSTRUCTIONS
Bronchospasm (Adult)    Bronchospasm occurs when the airways (bronchial tubes) go into spasm and contract. This makes it hard to breathe and causes wheezing (a high-pitched whistling sound). Bronchospasm can also cause frequent coughing without wheezing.  Bronchospasm is due to irritation, inflammation, or allergic reaction of the airways. People with asthma get bronchospasm. However, not everyone with bronchospasm has asthma.  Being exposed to harmful fumes, a recent case of bronchitis, exercise, or a flare-up of chronic obstructive pulmonary disease (COPD) may cause the airways to spasm. An episode of bronchospasm may last 7 to 14 days. Medicine may be prescribed to relax the airways and prevent wheezing. Antibiotics will be prescribed only if your healthcare provider thinks there is a bacterial infection. Antibiotics do not help a viral infection.  Home care  · Drink lots of water or other fluids (at least 10 glasses a day) during an attack. This will loosen lung secretions and make it easier to breathe. If you have heart or kidney disease, check with your doctor before you drink extra fluids.  · Take prescribed medicine exactly at the times advised. If you take an inhaled medicine to help with breathing, do not use it more than once every 4 hours, unless told to do so. If prescribed an antibiotic or prednisone, take all of the medicine, even if you are feeling better after a few days.  · Do not smoke. Also avoid being exposed to secondhand smoke.  · If you were given an inhaler, use it exactly as directed. If you need to use it more often than prescribed, your condition may be getting worse. Contact your healthcare provider.  Follow-up care  Follow up with your healthcare provider, or as advised.  Note: If you are age 65 or older, have a chronic lung disease or condition that affects your immune system, or you smoke, we recommend getting pneumococcal vaccinations, as well as an influenza vaccination (flu shot)  every autumn. Ask your healthcare provider about this.  When to seek medical advice  Call your healthcare provider right away if any of these occur:  · You need to use your inhalers more often than usual.  · You develop a fever of 100.4°F (38°C) or higher.  · You are coughing up lots of dark-colored sputum (mucus).  · You do not start to improve within 24 hours.  Call 911, or get immediate medical care  Contact emergency services if any of these occur:  · Coughing up bloody sputum (mucus)  · Chest pain with each breath  · Increased wheezing or shortness of breath  Date Last Reviewed: 9/13/2015  © 7480-1634 [employer]. [address] [place] [address], [place] [address]. All rights reserved. This information is not intended as a substitute for professional medical care. Always follow your healthcare professional's instructions.

## 2018-06-05 ENCOUNTER — OFFICE VISIT (OUTPATIENT)
Dept: CARDIOLOGY | Facility: CLINIC | Age: 68
End: 2018-06-05
Payer: MEDICARE

## 2018-06-05 ENCOUNTER — HOSPITAL ENCOUNTER (OUTPATIENT)
Dept: RADIOLOGY | Facility: HOSPITAL | Age: 68
Discharge: HOME | End: 2018-06-05
Attending: ORTHOPAEDIC SURGERY
Payer: MEDICARE

## 2018-06-05 ENCOUNTER — OFFICE VISIT (OUTPATIENT)
Dept: PODIATRY | Facility: CLINIC | Age: 68
End: 2018-06-05
Payer: MEDICARE

## 2018-06-05 ENCOUNTER — OFFICE VISIT (OUTPATIENT)
Dept: ORTHOPEDICS | Facility: CLINIC | Age: 68
End: 2018-06-05
Payer: MEDICARE

## 2018-06-05 VITALS
DIASTOLIC BLOOD PRESSURE: 64 MMHG | WEIGHT: 194 LBS | HEIGHT: 63 IN | BODY MASS INDEX: 34.38 KG/M2 | SYSTOLIC BLOOD PRESSURE: 110 MMHG | HEART RATE: 89 BPM

## 2018-06-05 VITALS
WEIGHT: 194 LBS | SYSTOLIC BLOOD PRESSURE: 109 MMHG | HEART RATE: 84 BPM | HEIGHT: 63 IN | DIASTOLIC BLOOD PRESSURE: 70 MMHG | BODY MASS INDEX: 34.38 KG/M2

## 2018-06-05 VITALS
HEIGHT: 63 IN | SYSTOLIC BLOOD PRESSURE: 119 MMHG | DIASTOLIC BLOOD PRESSURE: 81 MMHG | HEART RATE: 90 BPM | BODY MASS INDEX: 34.38 KG/M2 | WEIGHT: 194 LBS

## 2018-06-05 DIAGNOSIS — Z01.818 PRE-OP TESTING: Primary | ICD-10-CM

## 2018-06-05 DIAGNOSIS — I47.10 PAROXYSMAL SVT (SUPRAVENTRICULAR TACHYCARDIA): Primary | ICD-10-CM

## 2018-06-05 DIAGNOSIS — M70.21 OLECRANON BURSITIS OF RIGHT ELBOW: Primary | ICD-10-CM

## 2018-06-05 DIAGNOSIS — E78.5 HYPERLIPIDEMIA ASSOCIATED WITH TYPE 2 DIABETES MELLITUS: Chronic | ICD-10-CM

## 2018-06-05 DIAGNOSIS — I10 HYPERTENSION, UNSPECIFIED TYPE: ICD-10-CM

## 2018-06-05 DIAGNOSIS — R52 PAIN: ICD-10-CM

## 2018-06-05 DIAGNOSIS — M89.8X9 EXOSTOSIS: ICD-10-CM

## 2018-06-05 DIAGNOSIS — R52 PAIN: Primary | ICD-10-CM

## 2018-06-05 DIAGNOSIS — I10 ESSENTIAL HYPERTENSION: Chronic | ICD-10-CM

## 2018-06-05 DIAGNOSIS — E11.9 CONTROLLED TYPE 2 DIABETES MELLITUS WITHOUT COMPLICATION, WITHOUT LONG-TERM CURRENT USE OF INSULIN: Chronic | ICD-10-CM

## 2018-06-05 DIAGNOSIS — B35.1 DERMATOPHYTOSIS OF NAIL: Primary | ICD-10-CM

## 2018-06-05 DIAGNOSIS — E11.69 HYPERLIPIDEMIA ASSOCIATED WITH TYPE 2 DIABETES MELLITUS: Chronic | ICD-10-CM

## 2018-06-05 PROCEDURE — 93000 ELECTROCARDIOGRAM COMPLETE: CPT | Mod: S$GLB,,, | Performed by: INTERNAL MEDICINE

## 2018-06-05 PROCEDURE — 73080 X-RAY EXAM OF ELBOW: CPT | Mod: TC,FY,PO,RT

## 2018-06-05 PROCEDURE — 3078F DIAST BP <80 MM HG: CPT | Mod: CPTII,S$GLB,, | Performed by: ORTHOPAEDIC SURGERY

## 2018-06-05 PROCEDURE — 99499 UNLISTED E&M SERVICE: CPT | Mod: S$PBB,,, | Performed by: INTERNAL MEDICINE

## 2018-06-05 PROCEDURE — 99214 OFFICE O/P EST MOD 30 MIN: CPT | Mod: S$GLB,,, | Performed by: ORTHOPAEDIC SURGERY

## 2018-06-05 PROCEDURE — 99999 PR PBB SHADOW E&M-EST. PATIENT-LVL III: CPT | Mod: PBBFAC,,, | Performed by: ORTHOPAEDIC SURGERY

## 2018-06-05 PROCEDURE — 73080 X-RAY EXAM OF ELBOW: CPT | Mod: 26,RT,, | Performed by: RADIOLOGY

## 2018-06-05 PROCEDURE — 3044F HG A1C LEVEL LT 7.0%: CPT | Mod: CPTII,S$GLB,, | Performed by: INTERNAL MEDICINE

## 2018-06-05 PROCEDURE — 99213 OFFICE O/P EST LOW 20 MIN: CPT | Mod: S$GLB,,, | Performed by: PODIATRIST

## 2018-06-05 PROCEDURE — 99999 PR PBB SHADOW E&M-EST. PATIENT-LVL III: CPT | Mod: PBBFAC,,, | Performed by: INTERNAL MEDICINE

## 2018-06-05 PROCEDURE — 99213 OFFICE O/P EST LOW 20 MIN: CPT | Mod: S$GLB,,, | Performed by: INTERNAL MEDICINE

## 2018-06-05 PROCEDURE — 3079F DIAST BP 80-89 MM HG: CPT | Mod: CPTII,S$GLB,, | Performed by: PODIATRIST

## 2018-06-05 PROCEDURE — 99999 PR PBB SHADOW E&M-EST. PATIENT-LVL IV: CPT | Mod: PBBFAC,,, | Performed by: PODIATRIST

## 2018-06-05 PROCEDURE — 3074F SYST BP LT 130 MM HG: CPT | Mod: CPTII,S$GLB,, | Performed by: INTERNAL MEDICINE

## 2018-06-05 PROCEDURE — 3074F SYST BP LT 130 MM HG: CPT | Mod: CPTII,S$GLB,, | Performed by: PODIATRIST

## 2018-06-05 PROCEDURE — 3078F DIAST BP <80 MM HG: CPT | Mod: CPTII,S$GLB,, | Performed by: INTERNAL MEDICINE

## 2018-06-05 PROCEDURE — 3074F SYST BP LT 130 MM HG: CPT | Mod: CPTII,S$GLB,, | Performed by: ORTHOPAEDIC SURGERY

## 2018-06-05 RX ORDER — CICLOPIROX 80 MG/ML
SOLUTION TOPICAL
Qty: 1 BOTTLE | Refills: 10 | Status: SHIPPED | OUTPATIENT
Start: 2018-06-05 | End: 2020-04-07

## 2018-06-05 NOTE — PROGRESS NOTES
PODIATRY NOTE    CHIEF COMPLAINT  Chief Complaint   Patient presents with    Diabetic Foot Exam     annual PCP Dr. Maldonado 2/12/18         HPI    SUBJECTIVE: Cassandra Campos is a 67 y.o. female w/ PMH of DM2,  and other medical problems who presents to clinic for follow up fungal toenails. She has tried vicks vapor rub with mild improvement. She would like topical Rx. No further pedal complaints.        HgA1c:   Hemoglobin A1C   Date Value Ref Range Status   02/12/2018 5.3 4.0 - 5.6 % Final     Comment:     According to ADA guidelines, hemoglobin A1c <7.0% represents  optimal control in non-pregnant diabetic patients. Different  metrics may apply to specific patient populations.   Standards of Medical Care in Diabetes-2016.  For the purpose of screening for the presence of diabetes:  <5.7%     Consistent with the absence of diabetes  5.7-6.4%  Consistent with increasing risk for diabetes   (prediabetes)  >or=6.5%  Consistent with diabetes  Currently, no consensus exists for use of hemoglobin A1c  for diagnosis of diabetes for children.  This Hemoglobin A1c assay has significant interference with fetal   hemoglobin   (HbF). The results are invalid for patients with abnormal amounts of   HbF,   including those with known Hereditary Persistence   of Fetal Hemoglobin. Heterozygous hemoglobin variants (HbAS, HbAC,   HbAD, HbAE, HbA2) do not significantly interfere with this assay;   however, presence of multiple variants in a sample may impact the %   interference.     08/01/2017 5.5 4.0 - 5.6 % Final     Comment:     According to ADA guidelines, hemoglobin A1c <7.0% represents  optimal control in non-pregnant diabetic patients. Different  metrics may apply to specific patient populations.   Standards of Medical Care in Diabetes-2016.  For the purpose of screening for the presence of diabetes:  <5.7%     Consistent with the absence of diabetes  5.7-6.4%  Consistent with increasing risk for diabetes  "  (prediabetes)  >or=6.5%  Consistent with diabetes  Currently, no consensus exists for use of hemoglobin A1c  for diagnosis of diabetes for children.  This Hemoglobin A1c assay has significant interference with fetal   hemoglobin   (HbF). The results are invalid for patients with abnormal amounts of   HbF,   including those with known Hereditary Persistence   of Fetal Hemoglobin. Heterozygous hemoglobin variants (HbAS, HbAC,   HbAD, HbAE, HbA2) do not significantly interfere with this assay;   however, presence of multiple variants in a sample may impact the %   interference.     02/13/2017 6.3 (H) 4.5 - 6.2 % Final     Comment:     According to ADA guidelines, hemoglobin A1C <7.0% represents  optimal control in non-pregnant diabetic patients.  Different  metrics may apply to specific populations.   Standards of Medical Care in Diabetes - 2016.  For the purpose of screening for the presence of diabetes:  <5.7%     Consistent with the absence of diabetes  5.7-6.4%  Consistent with increasing risk for diabetes   (prediabetes)  >or=6.5%  Consistent with diabetes  Currently no consensus exists for use of hemoglobin A1C  for diagnosis of diabetes for children.         REVIEW OF SYSTEMS  General: Denies any fever or chills  Chest: Denies shortness of breath, wheezing, coughing, or sputum production  Heart: Denies chest pain.  As noted above and per history of current illness above, otherwise negative in the remainder of the 14 systems.     PHYSICAL EXAM  Vitals:    06/05/18 1039   BP: 119/81   Pulse: 90   Weight: 88 kg (194 lb 0.1 oz)   Height: 5' 3" (1.6 m)       GEN:  This patient is well-developed, well-nourished and appears stated age, well-oriented to person, place and time, and cooperative and pleasant on today's visit.      LOWER EXTREMITY  Vascular:   · DP pedal pulse 2/4 b/l, PT pedal pulse 2/4 b/l  · Skin temperature warm to warm from prox to distally  · CFT <5 secs b/l  · There is no  edema noted b/l. "     Dermatologic:   · Thickened, dystrophic, elongated toenails with subungal debris 1-5 b/l.   · No open skin lesions noted  · No erythema or drainage noted b/l.  · Webspaces are C/D/I B/L.  · There is no hyperkeratotic tissue noted.  · Skin texture and turgor dry/shiny  · There is no pedal hair growth noted    Neurologic:  · Vibratory sensation diminished at level of Hallux IPJ b/l    · Protective sensation absent at 0/10 sites upon examination with Koppel Weinsten 5.07 g monofilament.   · Propioception intact at 1st MTPJ b/l.   · Achilles and patellar deep tendon reflexes intact  · Babinski reflex absent b/l. Light touch and sharp/dull sensation intact b/l.    Musculoskeletal/Orthopedic:  · No symptomatic structural abnormalities noted.   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 Muscle tone is normal.  · Pain free range of motion in all four quadrants without stiffness and limitation b/l      ASSESSMENT  Dermatophytosis of nail    Other orders  -     ciclopirox (PENLAC) 8 % Soln; Apply to affected toenails at night time DAILY. On 7th day, file nails down, clean all nails with alcohol and restart application process.  Dispense: 1 Bottle; Refill: 10          Plan:  -Discuss presenting problems, etiology, pathologic processes and management options with patient today.     -Patient was given written and verbal instructions on maintenance care for mycotic nails. The need for proper skin care in order to prevent infection and colonization in the nails was explained to the patient.    -For the nails, patient was prescribed  Ciclopirox nail lacquer to be applied every day in the evening. On the 7th day clean nails with alcohol swab, file nails down, and restart cycle.    -In addition, recommendations were made to spray and disinfect shoes with Lysol and to alternate shoes            Future Appointments  Date Time  Provider Department Center   6/5/2018 1:15 PM [facility] X[facility]2 [facility] [place]   6/5/2018 1:30 PM Jayro Pedraza Sr., MD [facility] ORTHO [place]   8/13/2018 10:30 AM Denia Maldonado MD [facility] [place]   10/9/2018 12:45 PM [facility] XR2 [facility] X[facility] [place]   10/9/2018 1:00 PM PULMONARY LAB, [facility] PULMLAB [place]   10/9/2018 1:20 PM Danna Mckeon NP [facility] PULMSVC [place]

## 2018-06-05 NOTE — PATIENT INSTRUCTIONS
PENLAC ANTI-FUNGAL TOPICAL INSTRUCTIONS   1. You have been prescribed Penlac nail lacquer (Ciclopirox) topical solution 8%. Go and  the prescription from your local pharmacy.    2. Remove any nail polish on your feet. File away (with emery board) loose nail material and trim nails.    3. Apply the Penlac nail lacquer (ciclopirox) Topical Solution, 8% once daily (preferably at bedtime or eight hours before washing) to all affected nails with the applicator brush provided. The nail lacquer should be applied evenly over the entire nail plate. If possible, apply the solution to the nail bed, hyponychium, and the under surface of the nail plate when if your nail is loosely attached. Remember fungus lives under the nail plate, therefore the more the solution penetrates the nail bed, the better.    3. Continue to apply the solution daily (at bedtime preferably).  Daily applications should be made over the previous coat and removed with alcohol every seven days. This cycle should be repeated throughout the duration of therapy.     4. On the 7th day, remove the solution from all of your nails with alcohol. File your nails again with an emery board and then repeat the cycle again.    5. This treatment must be consistent. If you skip a day, continue the cycle as recommended. It may take up to 1 year for your nails to clear the fungal infection. Be patient.    It is recommended to dispose of all infected shoe gear that you will not likely wear again as well as weekly cleansing of all showering/bathing areas with antiseptics.Fungal spores like to hide in dark, warm, moist environments. Lastly, monthly treatments of all current shoe gear with moth balls x 8 hours.                  FUNGAL NAIL INFECTIONS    If your nail is thick and looks white or yellow, it may be infected with fungus. Nail   infections don't look pleasant, but they are not serious. There are treatments that   can clear up the infection.     What is a  fungal nail infection?   It's easy to catch a fungal nail infection, and lots of people get them. The sooner you   treat an infection, the easier it is to get rid of it. The fungi that cause these infections often live in warm, damp places, such as showers and floors around changing rooms.   People used to think there was nothing you could do about a fungal nail infection. But   there are now good treatments that can get rid of it. However, they take a long time to   work (as long as one year if the infection is bad). So you need to be patient.    Fungal infection of the nails causes changes in the appearance of fingernails and toenails. They may thicken, discolor, change shape or split. This condition is difficult to treat because nails grow slowly and have limited blood supply. It is common to have the infection come back after treatment.       What are the symptoms?   Your nail may look whitish or yellowish, and raised up from the finger or toe. The skin   around your nail may turn red. Sometimes the nail lifts off altogether. If the infection is   severe, the nail may also be crumbly. It's more common to get an infection of a toenail   than a fingernail. If you've had an infection for a long time, it may be painful. If you have a badly infected toe, it may be difficult to walk. If your immune system is weak or you have diabetes, you may be more likely to get these infections. The infection can also be more serious. So it's important to see your doctor as soon as possible if you think you have a nail infection.     What treatments work?   To get rid of a fungal nail infection you will probably have to take tablets, sometimes for   several months. There are also topical solutions (over the counter and prescription) that  you can put on your nail, and  things you can do to try to avoid getting another nail infection.    There are two types of medicines used.   Topical anti-fungal medicines are applied to the  "surface of the skin and nail area. These medicines are not very effective because they cannot get deep into the nail.     Topical medicines are most useful in combination with oral medicines . Oral antifungal medicines are more effective because they penetrate the nail from the inside out.  If medicines fail, the nail can be removed surgically or chemically. This improves the effectiveness of medical treatment because the fungus is physically removed from the body.       Tablets   The tablets that doctors use most often are called itraconazole (brand name Sporanox)   and terbinafine (Lamisil). Terbinafine seems to work slightly better. If you take terbinafine every day for 12 weeks, there's a 6 in 10 chance you'll get rid of   your fungal toenail infection.      How are fungal nail infections treated? -- Treatment depends, in part, on how severe the infection is, and how much it bothers you. If your infection is mild or doesn't bother you very much, you might choose not to treat it. An untreated nail infection probably won't go away, but it probably won't cause any long-term problems either.  When people need or choose to have treatment, it usually involves "antifungal" medicines that you get with a prescription from your doctor. These medicines are taken by mouth or put on the nail.Treatment with pills usually lasts a few months. Some people who take these medicines need to have blood tests. That's because these medicines can affect the liver.  If you don't want to or can't take antifungal pills, your doctor will talk with you about other treatment options. These might include using an antifungal medicine on the nail or having surgery to remove your nail.    Before starting any of these treatments, you should know that:  ?It can take many months for your nail to look normal again.  ?There is a chance that the treatment won't work. The infection might not get better, or it might come back. If either of these things " happen, your doctor can try another treatment or send you to a specialist.  Can fungal nail infections be prevented? -- Sometimes. To reduce your chance of getting one, you can:  ?Keep your feet clean and dry.  ?Avoid sharing nail tools, such as clippers and scissors.  ?Wear flip-flops or other footwear in a gym shower or locker room.  What if I want to get pregnant? -- If you want to get pregnant, let your doctor or nurse know. He or she might recommend that you not take certain antifungal medicines during pregnancy.    Alternative final options are:   If still no resolution with oral tablets or topics: then we can completely avulse/remove all involved toenails with subsequent treatment with topical antifungal solution.       Home Care:   1) Use medicines exactly as directed for as long as directed. Treating a fungal infection can take longer than other kinds of infections.   2) Smoking is a risk factor for fungal infection. This is one more reason to quit.   3) Wear absorbent socks and shoes that have ventilation. Sweaty feet increases risk of fungal infection and make an existing infection harder to treat.   4) Use footwear when in damp public places like swimming pools, gyms and shower rooms. This helps avoid exposure to the fungus that grows there.   It is recommended to dispose of all infected shoe gear that you will not likely wear again as well as weekly cleansing of all showering/bathing areas with antiseptics.Fungal spores like to hide in dark, warm, moist environments. Lastly, monthly treatments of all current shoe gear with moth balls x 8 hours.       Follow Up   with your doctor as directed by our staff.   Get Prompt Medical Attention   if any of the following occur:   -- Skin alongside the nail becomes reddened, swollen, painful or drains pus   -- Side effects from oral anti-fungal medicines       © 5353-0910 [employer]. [address] [place] [address], [place] [address]. All rights reserved.  This information is not intended as a substitute for professional medical care. Always follow your healthcare professional's instructions.

## 2018-06-05 NOTE — PROGRESS NOTES
Subjective:   Patient ID:  Cassandra Campos is a 67 y.o. female who presents for follow up of No chief complaint on file.        67 yo, female, came in fro 1yr f/u  PMH PSVT, HTN, HLP, NIDDM, obesity, ex smoker, DANIEL, not on CPAP.   Had bariatric surgery done in . Uncomplicated and last 30 pounds.  Walks 2 miles daily  Lives alone and no limitation of exercise  Patient feels OK, no chest pain, no sob, no palpitation, no dizziness, no syncope, no CNS symptoms.  Today, EKG NSR                          Past Medical History:   Diagnosis Date    Arthritis     hands    Borderline glaucoma     COPD (chronic obstructive pulmonary disease)     pt states she does not have copd    Diabetes mellitus, type 2     Gastritis     Gastritis     upper GI 2017    Hydradenitis     Hyperlipidemia     Hypertension     Insomnia     Migraines 2000    Morbid obesity due to excess calories 2016    Nasal septum perforation     Obesity     Pneumonia     Sleep apnea     SVT (supraventricular tachycardia) 2013    Type 2 diabetes mellitus     Type II or unspecified type diabetes mellitus without mention of complication, not stated as uncontrolled 2013       Past Surgical History:   Procedure Laterality Date    AXILLARY HIDRADENITIS EXCISION      BREAST BIOPSY      BREAST LUMPECTOMY Bilateral 1998    Benign    CARPAL TUNNEL RELEASE      bilateral    CATARACT EXTRACTION Bilateral     OU     SECTION  1979    CHOLECYSTECTOMY  2014    CYST REMOVAL  2015    sebaceous cyst removed from face    gastric sleeve  2017    Dr. Watson    KNEE SURGERY Right     TONSILLECTOMY, ADENOIDECTOMY  1980s    TRIGGER FINGER RELEASE Right 2015    Dr. Pedraza       Social History   Substance Use Topics    Smoking status: Former Smoker     Packs/day: 0.50     Years: 30.00     Types: Cigarettes     Start date: 1970     Quit date: 2012    Smokeless tobacco: Never Used    Alcohol  use Yes      Comment: rarely // wine  No alcohol prior to surgery       Family History   Problem Relation Age of Onset    Prostate cancer Brother     Diabetes Maternal Aunt          Review of Systems   Constitution: Negative for decreased appetite, diaphoresis, fever, weakness, malaise/fatigue and night sweats.   HENT: Negative for nosebleeds.    Eyes: Negative for blurred vision and double vision.   Cardiovascular: Negative for chest pain, claudication, dyspnea on exertion, irregular heartbeat, leg swelling, near-syncope, orthopnea, palpitations, paroxysmal nocturnal dyspnea and syncope.   Respiratory: Negative for cough, shortness of breath, sleep disturbances due to breathing, snoring, sputum production and wheezing.    Endocrine: Negative for cold intolerance and polyuria.   Hematologic/Lymphatic: Does not bruise/bleed easily.   Skin: Negative for rash.   Musculoskeletal: Negative for back pain, falls, joint pain, joint swelling and neck pain.   Gastrointestinal: Negative for abdominal pain, heartburn, nausea and vomiting.   Genitourinary: Negative for dysuria, frequency and hematuria.   Neurological: Negative for difficulty with concentration, dizziness, focal weakness, headaches, light-headedness, numbness and seizures.   Psychiatric/Behavioral: Negative for depression, memory loss and substance abuse. The patient does not have insomnia.    Allergic/Immunologic: Negative for HIV exposure and hives.       Objective:   Physical Exam   Constitutional: She is oriented to person, place, and time. She appears well-nourished.   HENT:   Head: Normocephalic.   Eyes: Pupils are equal, round, and reactive to light.   Neck: Normal carotid pulses and no JVD present. Carotid bruit is not present. No thyromegaly present.   Cardiovascular: Normal rate, regular rhythm, normal heart sounds and normal pulses.   No extrasystoles are present. PMI is not displaced.  Exam reveals no gallop and no S3.    No murmur  heard.  Pulmonary/Chest: Breath sounds normal. No stridor. No respiratory distress.   Abdominal: Soft. Bowel sounds are normal. There is no tenderness. There is no rebound.   Musculoskeletal: Normal range of motion.   Neurological: She is alert and oriented to person, place, and time.   Skin: Skin is intact. No rash noted.   Psychiatric: Her behavior is normal.       Lab Results   Component Value Date    CHOL 169 08/01/2017    CHOL 170 11/28/2016    CHOL 132 12/22/2015     Lab Results   Component Value Date    HDL 40 08/01/2017    HDL 30 (L) 11/28/2016    HDL 33 (L) 12/22/2015     Lab Results   Component Value Date    LDLCALC 96.0 08/01/2017    LDLCALC 98.4 11/28/2016    LDLCALC 63.8 12/22/2015     Lab Results   Component Value Date    TRIG 165 (H) 08/01/2017    TRIG 208 (H) 11/28/2016    TRIG 176 (H) 12/22/2015     Lab Results   Component Value Date    CHOLHDL 23.7 08/01/2017    CHOLHDL 17.6 (L) 11/28/2016    CHOLHDL 25.0 12/22/2015       Chemistry        Component Value Date/Time     02/12/2018 1226    K 4.4 02/12/2018 1226     02/12/2018 1226    CO2 26 02/12/2018 1226    BUN 9 02/12/2018 1226    CREATININE 0.7 02/12/2018 1226    GLU 96 02/12/2018 1226        Component Value Date/Time    CALCIUM 9.6 02/12/2018 1226    ALKPHOS 111 02/12/2018 1226    AST 30 02/12/2018 1226    ALT 38 02/12/2018 1226    BILITOT 0.4 02/12/2018 1226    ESTGFRAFRICA >60.0 02/12/2018 1226    EGFRNONAA >60.0 02/12/2018 1226          Lab Results   Component Value Date    TSH 2.386 11/28/2016     No results found for: INR, PROTIME  Lab Results   Component Value Date    WBC 8.35 08/01/2017    HGB 13.9 08/01/2017    HCT 40.1 08/01/2017    MCV 73 (L) 08/01/2017     08/01/2017     BMP  Sodium   Date Value Ref Range Status   02/12/2018 140 136 - 145 mmol/L Final     Potassium   Date Value Ref Range Status   02/12/2018 4.4 3.5 - 5.1 mmol/L Final     Chloride   Date Value Ref Range Status   02/12/2018 106 95 - 110 mmol/L Final      CO2   Date Value Ref Range Status   02/12/2018 26 23 - 29 mmol/L Final     BUN, Bld   Date Value Ref Range Status   02/12/2018 9 8 - 23 mg/dL Final     Creatinine   Date Value Ref Range Status   02/12/2018 0.7 0.5 - 1.4 mg/dL Final     Calcium   Date Value Ref Range Status   02/12/2018 9.6 8.7 - 10.5 mg/dL Final     Anion Gap   Date Value Ref Range Status   02/12/2018 8 8 - 16 mmol/L Final     eGFR if    Date Value Ref Range Status   02/12/2018 >60.0 >60 mL/min/1.73 m^2 Final     eGFR if non    Date Value Ref Range Status   02/12/2018 >60.0 >60 mL/min/1.73 m^2 Final     Comment:     Calculation used to obtain the estimated glomerular filtration  rate (eGFR) is the CKD-EPI equation.        CrCl cannot be calculated (Patient's most recent lab result is older than the maximum 7 days allowed.).     Assessment:      1. Paroxysmal SVT (supraventricular tachycardia)    2. Hyperlipidemia associated with type 2 diabetes mellitus    3. Essential hypertension    4. Controlled type 2 diabetes mellitus without complication, without long-term current use of insulin      BP and LDL wnl  Np palpitation  Plan:   contineu metoprolol and Statin  RTC in 1 yr

## 2018-06-06 PROBLEM — M70.21 OLECRANON BURSITIS OF RIGHT ELBOW: Status: ACTIVE | Noted: 2018-06-06

## 2018-06-06 PROBLEM — M89.8X9 EXOSTOSIS: Status: ACTIVE | Noted: 2018-06-06

## 2018-06-06 NOTE — PROGRESS NOTES
CC:  This is a 67-year-old female that complains of right elbow pain that interferes with activities of daily living.  Chief Complaint   Patient presents with    Right Elbow - Pain       HPI:  Patient states that she has failed conservative treatment of her right elbow pain. She continues to bump the right olecranon which causes significant pain and debility.  Patient states that the pain level as a 10/10.    PMH:    Past Medical History:   Diagnosis Date    Arthritis     hands    Borderline glaucoma     COPD (chronic obstructive pulmonary disease)     pt states she does not have copd    Diabetes mellitus, type 2     Gastritis     Gastritis     upper GI 2017    Hydradenitis     Hyperlipidemia     Hypertension     Insomnia     Migraines 2000    Morbid obesity due to excess calories 2016    Nasal septum perforation     Obesity     Pneumonia     Sleep apnea     SVT (supraventricular tachycardia) 2013    Type 2 diabetes mellitus     Type II or unspecified type diabetes mellitus without mention of complication, not stated as uncontrolled 2013       PSH:    Past Surgical History:   Procedure Laterality Date    AXILLARY HIDRADENITIS EXCISION      BREAST BIOPSY      BREAST LUMPECTOMY Bilateral 1998    Benign    CARPAL TUNNEL RELEASE      bilateral    CATARACT EXTRACTION Bilateral     OU     SECTION  1979    CHOLECYSTECTOMY  2014    CYST REMOVAL  2015    sebaceous cyst removed from face    gastric sleeve  2017    Dr. Watson    KNEE SURGERY Right     TONSILLECTOMY, ADENOIDECTOMY  1980s    TRIGGER FINGER RELEASE Right 2015    Dr. Pedraza       Family Hx:    Family History   Problem Relation Age of Onset    Prostate cancer Brother     Diabetes Maternal Aunt        Allergy:  Review of patient's allergies indicates:  No Known Allergies    Medication:    Current Outpatient Prescriptions:     ACCU-CHEK FASTCLIX Misc, USE ONE TIME DAILY, Disp: 100  each, Rfl: 3    ACCU-CHEK SMARTVIEW TEST STRIP Strp, TEST ONE TIME DAILY, Disp: 100 strip, Rfl: 3    albuterol 90 mcg/actuation inhaler, Inhale 2 puffs into the lungs every 4 to 6 hours as needed for Wheezing., Disp: 1 Inhaler, Rfl: 11    amitriptyline (ELAVIL) 100 MG tablet, TAKE 1 TABLET NIGHTLY, Disp: 90 tablet, Rfl: 3    b complex vitamins tablet, Take 1 tablet by mouth every evening. , Disp: , Rfl:     blood-glucose meter kit, Use as instructed, Disp: 1 each, Rfl: 0    calcium citrate (CALCITRATE) 200 mg (950 mg) tablet, Take 1 tablet by mouth once daily. Ca 630 mg/Vit D 500IU - Pt takes 1 tab three times daily, Disp: , Rfl:     ciclopirox (PENLAC) 8 % Soln, Apply to affected toenails at night time DAILY. On 7th day, file nails down, clean all nails with alcohol and restart application process., Disp: 1 Bottle, Rfl: 10    cyanocobalamin, vitamin B-12, (VITAMIN B-12) 50 mcg tablet, Take 50 mcg by mouth once daily., Disp: , Rfl:     eszopiclone 3 mg Tab, TAKE 1 TABLET EVERY NIGHT AS NEEDED, Disp: 90 tablet, Rfl: 0    ferrous sulfate 325 (65 FE) MG EC tablet, Take 18 mg by mouth once daily. , Disp: , Rfl:     metFORMIN (GLUCOPHAGE-XR) 500 MG 24 hr tablet, Take 2 tablets (1,000 mg total) by mouth once daily., Disp: 180 tablet, Rfl: 3    metoprolol succinate (TOPROL-XL) 50 MG 24 hr tablet, TAKE 1 TABLET EVERY DAY, Disp: 90 tablet, Rfl: 1    multivitamin (ONE DAILY MULTIVITAMIN) per tablet, Take 1 tablet by mouth 2 (two) times daily. , Disp: , Rfl:     omeprazole (PRILOSEC) 20 MG capsule, TAKE 1 CAPSULE ONE TIME DAILY, Disp: 90 capsule, Rfl: 1    pravastatin (PRAVACHOL) 40 MG tablet, TAKE 1 TABLET EVERY DAY, Disp: 90 tablet, Rfl: 1    Social History:    Social History     Social History    Marital status:      Spouse name: N/A    Number of children: 1    Years of education: N/A     Occupational History     aid      Social History Main Topics    Smoking status: Former  "Smoker     Packs/day: 0.50     Years: 30.00     Types: Cigarettes     Start date: 1/1/1970     Quit date: 1/1/2012    Smokeless tobacco: Never Used    Alcohol use Yes      Comment: rarely // wine  No alcohol prior to surgery    Drug use: No    Sexual activity: Not Currently     Partners: Male     Other Topics Concern    Not on file     Social History Narrative    Single, part-time teacher. Masters degree biology.        Vitals:   /70   Pulse 84   Ht 5' 3" (1.6 m)   Wt 88 kg (194 lb 0.1 oz)   BMI 34.37 kg/m²      ROS:  GENERAL: No fever, chills, fatigability or weight loss.  SKIN: No rashes, itching or changes in color or texture of skin.  HEAD: No headaches or recent head trauma.  EYES: Visual acuity fine. No photophobia, ocular pain or diplopia.  EARS: Denies ear pain, discharge or vertigo.  NOSE: No loss of smell, no epistaxis or postnasal drip.  MOUTH & THROAT: No hoarseness or change in voice. No excessive gum bleeding.  NODES: Denies swollen glands.  CHEST: Denies BOGGS, cyanosis, wheezing, cough and sputum production.  CARDIOVASCULAR: Denies chest pain, PND, orthopnea or reduced exercise tolerance.  ABDOMEN: Appetite fine. No weight loss. Denies diarrhea, abdominal pain, hematemesis or blood in stool.  URINARY: No flank pain, dysuria or hematuria.  PERIPHERAL VASCULAR: No claudication or cyanosis.  NEUROLOGIC: No history of seizures, paralysis, alteration of gait or coordination.  MUSCULOSKELETAL: See HPI    PE:  APPEARANCE: Well nourished, well developed, in no acute distress.   HEAD: Normocephalic, atraumatic.  EYES: PERRL. EOMI.   EARS: TM's intact. Light reflex normal. No retraction or perforation.   NOSE: Mucosa pink. Airway clear.  MOUTH & THROAT: No tonsillar enlargement. No pharyngeal erythema or exudate. No stridor.  NECK: Supple.   NODES: No cervical, axillary or inguinal lymph node enlargement.  CHEST: Lungs clear to auscultation.  CARDIOVASCULAR: Normal S1, S2. No rubs, murmurs or " gallops.  ABDOMEN: Bowel sounds normal. Not distended. Soft. No tenderness or masses.  NEUROLOGIC: Cranial Nerves: II-XII grossly intact, also see MUSCULOSKELETAL  MUSCULOSKELETAL:     Right elbow-prominent olecranon osteophyte, swelling within the olecranon bursa.  Full range motion of the elbow no erythema or warmth.  Patient's skin is intact.    Assessment:  X-Ray Elbow Complete 3 view Right  Narrative: EXAMINATION:  XR ELBOW COMPLETE 3 VIEW RIGHT    CLINICAL HISTORY:  . Pain, unspecified    TECHNIQUE:  AP, lateral, and oblique views of the right elbow were performed.    COMPARISON:  Radiographs from September 8, 2016    FINDINGS:  A posterior olecranon spur is again noted.  There is mild spurring about the coronoid and lateral epicondyle regions which is stable in appearance.  No acute fracture or dislocation.  No joint effusion.  Impression: Stable exam as above    Electronically signed by: Pasha Johnson MD  Date:    06/05/2018  Time:    13:12             Diagnosis:              1.  Right olecranon bursitis                2.  Right elbow exostosis                   Diagnostic Studies  MRI-No  X-Ray-No  EMG/NCV-No  Arthrogram-No  Bone Scan-No  CT Scan-No  Doppler-No  ESR-No  CRP-No  CBC with Diff-No   Rheumatoid/Arthritis Panel-No      Plan:                                                 1. PT-no                                                 2.OT-yes                                          3.NSAID-yes                                        4. Narcotics-no                                     5. Wound care-No                                 6. Rest-yes                                           7. Surgery-yes , exostosis excision of the right elbow with olecranon bursectomy                                        8. VAZQUEZ Hose-no                                    9. Anticoagulation therapy-no               10. Elevation-no                                     11. Crutches-no                                    12.  Walker-no             13. Cane no                        14. Referral-no                                     15.Injection-no                            16. Splint   /    Cast   /   Cast Shoe-No              17. RICE-none            18. Follow up-  32 weeks

## 2018-06-06 NOTE — PATIENT INSTRUCTIONS
Bursitis of the Elbow (Olecranon)  Your elbow joint contains a small fluid-filled sac called a bursa. The bursa helps the muscles and tendons move smoothly over the bone. It also cushions and protects your elbow. Bursitis is when the bursa is inflamed or swollen. This is most often due to overuse of or injury to the elbow. Symptoms include swelling and pain. If the elbow is red and feels warm to the touch, the bursa itself may be infected.  In most cases, elbow bursitis resolves with medicine and self-care at home. It may take several weeks for the bursa to heal and the swelling to go away. In some cases, your healthcare provider may drain excess fluid from the bursa. Or, he or she may inject medicine directly into the bursa to help relieve symptoms. In severe cases, you may need surgery to remove the bursa may. If there is concern that the bursa is infected, your healthcare provider may prescribe antibiotics to treat the infection.    Home care  Your healthcare provider may prescribe medicine to help relieve pain and swelling. This may be an over-the-counter pain reliever or prescription pain medicine. Take all medicines as directed. To help treat or prevent infection, your provider may prescribe antibiotics. If these are prescribed, take them as directed until they are gone.  The following are general care guidelines:  · Apply an ice pack or bag of frozen peas wrapped in a thin towel to your elbow for 15 to 20 minutes at a time. Do this 3 to 4 times a day until pain and swelling improve.  · Keep your elbow raised above the level of your heart whenever possible. This helps reduce swelling. When sitting or lying down, place your arm on a pillow that rests on your chest or on a pillow at your side.  · Use an elastic wrap around the elbow joint to compress the area while it is healing. Make the wrap snug but not tight to the point of causing pain.  · Rest your elbow to give it time to heal. You may need to wear an  elbow pad to help protect and limit the movement of your elbow. During and after healing, avoid leaning on your elbows.  Follow-up care  Follow up with your healthcare provider, or as advised. If you have been referred to a specialist, make that appointment promptly.  When to seek medical advice  Call your healthcare provider right away if any of these occur:  · Fever of 100.4°F (38°C) or higher, or as advised  · Chills  · Increased pain, swelling, warmth, redness, or drainage from the joint  · Trouble moving the elbow joint  · Numbness or tingling in the hand  · Severe pain or swelling in forearm or hand  · Loss of pink color and slow return of color after squeezing fingertip or hand  Date Last Reviewed: 6/1/2016  © 3588-8953 [facility], [employer]. [address] [place] [address], [place] [address]. All rights reserved. This information is not intended as a substitute for professional medical care. Always follow your healthcare professional's instructions.

## 2018-06-13 DIAGNOSIS — M70.21 OLECRANON BURSITIS OF RIGHT ELBOW: Primary | ICD-10-CM

## 2018-06-15 ENCOUNTER — TELEPHONE (OUTPATIENT)
Dept: PULMONOLOGY | Facility: CLINIC | Age: 68
End: 2018-06-15

## 2018-06-15 ENCOUNTER — TELEPHONE (OUTPATIENT)
Dept: INTERNAL MEDICINE | Facility: CLINIC | Age: 68
End: 2018-06-15

## 2018-06-15 NOTE — TELEPHONE ENCOUNTER
Pt contacted staff to schedule [facility] appointment with Dr Mcdermott on 7/02/2018. Pt informed that MD Baldemar would not be available at [facility] on that day.    Pt verbalized understanding.

## 2018-06-15 NOTE — TELEPHONE ENCOUNTER
Patient called in regards to add a urine test to her July visit. Pt urine was added to her visit. Pt verbalized understanding of the information given.

## 2018-06-15 NOTE — TELEPHONE ENCOUNTER
----- Message from Carmen Castro sent at 6/15/2018 12:04 PM CDT -----  Contact: pt  Pt wants to add urine test for her August visit.

## 2018-06-15 NOTE — TELEPHONE ENCOUNTER
----- Message from Sugar Hernandez sent at 6/15/2018 12:14 PM CDT -----  Contact: self/[phone]  Would like to consult with nurse regarding come in on 07-02, please call back at [phone]. Thanks/ar

## 2018-06-18 RX ORDER — OMEPRAZOLE 20 MG/1
CAPSULE, DELAYED RELEASE ORAL
Qty: 90 CAPSULE | Refills: 1 | Status: SHIPPED | OUTPATIENT
Start: 2018-06-18 | End: 2018-12-13 | Stop reason: SDUPTHER

## 2018-06-26 DIAGNOSIS — G47.00 INSOMNIA, UNSPECIFIED TYPE: ICD-10-CM

## 2018-06-26 RX ORDER — ESZOPICLONE 3 MG/1
TABLET, FILM COATED ORAL
Qty: 90 TABLET | Refills: 0 | Status: SHIPPED | OUTPATIENT
Start: 2018-06-26 | End: 2018-09-05 | Stop reason: SDUPTHER

## 2018-07-02 ENCOUNTER — LAB VISIT (OUTPATIENT)
Dept: LAB | Facility: HOSPITAL | Age: 68
End: 2018-07-02
Attending: ORTHOPAEDIC SURGERY
Payer: MEDICARE

## 2018-07-02 DIAGNOSIS — Z01.818 PRE-OP TESTING: ICD-10-CM

## 2018-07-02 LAB
ALBUMIN SERPL BCP-MCNC: 3.7 G/DL
ALP SERPL-CCNC: 120 U/L
ALT SERPL W/O P-5'-P-CCNC: 70 U/L
ANION GAP SERPL CALC-SCNC: 6 MMOL/L
AST SERPL-CCNC: 38 U/L
BASOPHILS # BLD AUTO: 0.03 K/UL
BASOPHILS NFR BLD: 0.4 %
BILIRUB SERPL-MCNC: 0.3 MG/DL
BUN SERPL-MCNC: 20 MG/DL
CALCIUM SERPL-MCNC: 10 MG/DL
CHLORIDE SERPL-SCNC: 105 MMOL/L
CO2 SERPL-SCNC: 28 MMOL/L
CREAT SERPL-MCNC: 0.8 MG/DL
DIFFERENTIAL METHOD: ABNORMAL
EOSINOPHIL # BLD AUTO: 0.2 K/UL
EOSINOPHIL NFR BLD: 2.8 %
ERYTHROCYTE [DISTWIDTH] IN BLOOD BY AUTOMATED COUNT: 15 %
EST. GFR  (AFRICAN AMERICAN): >60 ML/MIN/1.73 M^2
EST. GFR  (NON AFRICAN AMERICAN): >60 ML/MIN/1.73 M^2
GLUCOSE SERPL-MCNC: 122 MG/DL
HCT VFR BLD AUTO: 40.7 %
HGB BLD-MCNC: 13.7 G/DL
LYMPHOCYTES # BLD AUTO: 3.6 K/UL
LYMPHOCYTES NFR BLD: 52.8 %
MCH RBC QN AUTO: 25.8 PG
MCHC RBC AUTO-ENTMCNC: 33.7 G/DL
MCV RBC AUTO: 77 FL
MONOCYTES # BLD AUTO: 0.4 K/UL
MONOCYTES NFR BLD: 5.9 %
NEUTROPHILS # BLD AUTO: 2.6 K/UL
NEUTROPHILS NFR BLD: 38.1 %
PLATELET # BLD AUTO: 233 K/UL
PMV BLD AUTO: 9.1 FL
POTASSIUM SERPL-SCNC: 3.9 MMOL/L
PROT SERPL-MCNC: 8.2 G/DL
RBC # BLD AUTO: 5.32 M/UL
SODIUM SERPL-SCNC: 139 MMOL/L
WBC # BLD AUTO: 6.82 K/UL

## 2018-07-02 PROCEDURE — 80053 COMPREHEN METABOLIC PANEL: CPT | Mod: PO

## 2018-07-02 PROCEDURE — 85025 COMPLETE CBC W/AUTO DIFF WBC: CPT | Mod: PO

## 2018-07-05 ENCOUNTER — TELEPHONE (OUTPATIENT)
Dept: PULMONOLOGY | Facility: CLINIC | Age: 68
End: 2018-07-05

## 2018-07-05 DIAGNOSIS — R06.02 SOB (SHORTNESS OF BREATH): Primary | ICD-10-CM

## 2018-07-05 NOTE — TELEPHONE ENCOUNTER
Pt contacted to schedule PreOp Testing.    Home called. Message left stating intentions.  Mobile called. Message left stating intentions.

## 2018-07-06 ENCOUNTER — TELEPHONE (OUTPATIENT)
Dept: PULMONOLOGY | Facility: CLINIC | Age: 68
End: 2018-07-06

## 2018-07-10 ENCOUNTER — PROCEDURE VISIT (OUTPATIENT)
Dept: PULMONOLOGY | Facility: CLINIC | Age: 68
End: 2018-07-10
Payer: MEDICARE

## 2018-07-10 ENCOUNTER — HOSPITAL ENCOUNTER (OUTPATIENT)
Dept: RADIOLOGY | Facility: HOSPITAL | Age: 68
Discharge: HOME | End: 2018-07-10
Attending: INTERNAL MEDICINE
Payer: MEDICARE

## 2018-07-10 ENCOUNTER — OFFICE VISIT (OUTPATIENT)
Dept: PULMONOLOGY | Facility: CLINIC | Age: 68
End: 2018-07-10
Payer: MEDICARE

## 2018-07-10 VITALS
BODY MASS INDEX: 34.44 KG/M2 | OXYGEN SATURATION: 94 % | RESPIRATION RATE: 18 BRPM | HEART RATE: 86 BPM | DIASTOLIC BLOOD PRESSURE: 66 MMHG | WEIGHT: 194.38 LBS | HEIGHT: 63 IN | SYSTOLIC BLOOD PRESSURE: 104 MMHG

## 2018-07-10 DIAGNOSIS — R06.02 SOB (SHORTNESS OF BREATH): ICD-10-CM

## 2018-07-10 DIAGNOSIS — R06.2 WHEEZING: ICD-10-CM

## 2018-07-10 DIAGNOSIS — Z01.811 PRE-OPERATIVE RESPIRATORY EXAMINATION: Primary | ICD-10-CM

## 2018-07-10 DIAGNOSIS — J98.4 RESTRICTIVE LUNG DISEASE: ICD-10-CM

## 2018-07-10 LAB
ALLENS TEST: ABNORMAL
DELSYS: ABNORMAL
FIO2: 21
HCO3 UR-SCNC: 24.8 MMOL/L (ref 24–28)
MODE: ABNORMAL
PCO2 BLDA: 39.7 MMHG (ref 35–45)
PH SMN: 7.4 [PH] (ref 7.35–7.45)
PO2 BLDA: 66 MMHG (ref 80–100)
POC BE: 0 MMOL/L
POC SATURATED O2: 93 % (ref 95–100)
SAMPLE: ABNORMAL
SITE: ABNORMAL

## 2018-07-10 PROCEDURE — 3074F SYST BP LT 130 MM HG: CPT | Mod: CPTII,S$GLB,, | Performed by: INTERNAL MEDICINE

## 2018-07-10 PROCEDURE — 94729 DIFFUSING CAPACITY: CPT | Mod: S$GLB,,, | Performed by: INTERNAL MEDICINE

## 2018-07-10 PROCEDURE — 94060 EVALUATION OF WHEEZING: CPT | Mod: S$GLB,,, | Performed by: INTERNAL MEDICINE

## 2018-07-10 PROCEDURE — 99215 OFFICE O/P EST HI 40 MIN: CPT | Mod: 25,S$GLB,, | Performed by: INTERNAL MEDICINE

## 2018-07-10 PROCEDURE — 94726 PLETHYSMOGRAPHY LUNG VOLUMES: CPT | Mod: S$GLB,,, | Performed by: INTERNAL MEDICINE

## 2018-07-10 PROCEDURE — 36600 WITHDRAWAL OF ARTERIAL BLOOD: CPT | Mod: S$GLB,,, | Performed by: INTERNAL MEDICINE

## 2018-07-10 PROCEDURE — 71048 X-RAY EXAM CHEST 4+ VIEWS: CPT | Mod: TC,PO

## 2018-07-10 PROCEDURE — 71048 X-RAY EXAM CHEST 4+ VIEWS: CPT | Mod: 26,,, | Performed by: RADIOLOGY

## 2018-07-10 PROCEDURE — 99999 PR PBB SHADOW E&M-EST. PATIENT-LVL IV: CPT | Mod: PBBFAC,,, | Performed by: INTERNAL MEDICINE

## 2018-07-10 PROCEDURE — 82803 BLOOD GASES ANY COMBINATION: CPT | Mod: S$GLB,,, | Performed by: INTERNAL MEDICINE

## 2018-07-10 PROCEDURE — 3078F DIAST BP <80 MM HG: CPT | Mod: CPTII,S$GLB,, | Performed by: INTERNAL MEDICINE

## 2018-07-10 RX ORDER — ALBUTEROL SULFATE 90 UG/1
2 AEROSOL, METERED RESPIRATORY (INHALATION)
Qty: 1 INHALER | Refills: 11 | Status: SHIPPED | OUTPATIENT
Start: 2018-07-10 | End: 2019-08-22 | Stop reason: SDUPTHER

## 2018-07-10 NOTE — PROCEDURES
See ABG Results  .Interpretation:  Arterial blood gases on room air are abnormal demonstrating hypoxemia.  Nixon Mcdermott MD  Pulmonary / Critical Care Medicine

## 2018-07-10 NOTE — PROGRESS NOTES
Subjective:       Patient ID: Cassandra Campos is a 67 y.o. female.    Chief Complaint: She       Pre-op Exam    HPI   Preop Dr. Powell - right elbow - bone spurs    Dyspnea  Patient complains of shortness of breath. Symptoms occur with more than one block walking, able to walk a few miles at a time. Symptoms began 5 years ago, unchanged since. Associated symptoms include  post nasal drip. She denies chest pain, located left chest. She does not have had recent travel. Weight has been stable. Symptoms are exacerbated by strenuous activity. Symptoms are alleviated by rest.   Exercises regularly  Intermittent cough and chest congestion - seaonal  Sinus congestion - hole in nose from snorting cocaine  Smoked 1ppd for many years - not smoking now, 15 pack years    Lung Nodule  She presents for evaluation and treatment of a lung nodule. The patient had an abnormal imaging study but reports experiencing no current or past pulmonary symptoms. The patient denies other associated symptoms. She quit smoking approximately 6 years ago. The patient has a known exposure to tuberculosis.  Negative TB skin test in past. The patient does not have a history of cancer.          Past Medical History:   Diagnosis Date    Arthritis     hands    Borderline glaucoma     COPD (chronic obstructive pulmonary disease)     pt states she does not have copd    Diabetes mellitus, type 2     Gastritis     Gastritis     upper GI 2/2017    Hydradenitis     Hyperlipidemia     Hypertension     Insomnia     Migraines 02/01/2000    Morbid obesity due to excess calories 11/1/2016    Nasal septum perforation     Obesity     Pneumonia     Sleep apnea     SVT (supraventricular tachycardia) 09/2013    Type 2 diabetes mellitus     Type II or unspecified type diabetes mellitus without mention of complication, not stated as uncontrolled 05/20/2013     Past Surgical History:   Procedure Laterality Date    AXILLARY HIDRADENITIS EXCISION       BREAST BIOPSY      BREAST LUMPECTOMY Bilateral 1998    Benign    CARPAL TUNNEL RELEASE      bilateral    CATARACT EXTRACTION Bilateral     OU     SECTION  1979    CHOLECYSTECTOMY  2014    CYST REMOVAL  2015    sebaceous cyst removed from face    gastric sleeve  2017    Dr. Watson    KNEE SURGERY Right     TONSILLECTOMY, ADENOIDECTOMY  1980s    TRIGGER FINGER RELEASE Right 2015    Dr. Pedraza     Social History     Social History    Marital status:      Spouse name: N/A    Number of children: 1    Years of education: N/A     Occupational History     aid      Social History Main Topics    Smoking status: Former Smoker     Packs/day: 0.50     Years: 30.00     Types: Cigarettes     Start date: 1970     Quit date: 2012    Smokeless tobacco: Never Used    Alcohol use Yes      Comment: rarely // wine  No alcohol prior to surgery    Drug use: No    Sexual activity: Not Currently     Partners: Male     Other Topics Concern    Not on file     Social History Narrative    Single, part-time teacher. Masters degree biology.      Review of Systems   Constitutional: Negative for fever and fatigue.   HENT: Negative for postnasal drip and rhinorrhea.    Eyes: Negative for redness and itching.   Respiratory: Positive for dyspnea on extertion. Negative for cough, shortness of breath, wheezing and Paroxysmal Nocturnal Dyspnea.    Cardiovascular: Negative for chest pain.   Genitourinary: Negative for difficulty urinating and hematuria.   Endocrine: Negative for polyphagia, cold intolerance and heat intolerance.    Musculoskeletal: Negative for arthralgias.   Skin: Negative for rash.   Gastrointestinal: Negative for nausea, vomiting, abdominal pain and abdominal distention.   Neurological: Negative for dizziness and headaches.   Hematological: Negative for adenopathy. Does not bruise/bleed easily and no excessive bruising.   Psychiatric/Behavioral: The  patient is not nervous/anxious.        Objective:      Physical Exam   Constitutional: She is oriented to person, place, and time. She appears well-developed and well-nourished.   HENT:   Head: Normocephalic and atraumatic.   Eyes: Conjunctivae are normal. Pupils are equal, round, and reactive to light.   Neck: Neck supple. No JVD present. No tracheal deviation present. No thyromegaly present.   Cardiovascular: Normal rate, regular rhythm and normal heart sounds.    Pulmonary/Chest: Effort normal. No respiratory distress. She has decreased breath sounds in the right lower field and the left lower field. She has no wheezes. She has no rales. She exhibits no tenderness.   Abdominal: Soft. Bowel sounds are normal.   Musculoskeletal: She exhibits no edema.   Right elbow - decrease ROM     Lymphadenopathy:     She has no cervical adenopathy.   Neurological: She is alert and oriented to person, place, and time.   Skin: Skin is warm and dry.   Nursing note and vitals reviewed.    Personal Diagnostic Review  Chest x-ray: stable;    Radiology Result      Name:   :  Patient MRN:   Cassandra Campos Maria 1950 [id number]   Account Number: Room & Bed Accession Number:   [id number]   [id number]   Authorizing Physician: Patient Class: Diagnosis:   Nixon Mcdermott OP- Outpatient Diagnostic Testing SOB (shortness of breath) [R06.02 (ICD-10-CM)]   Procedure:  Exam Date: Reason for Exam:   X-Ray Chest 4 Or More View 07/10/2018 None Specified             RESULTS:  EXAMINATION:  XR CHEST 4 OR MORE VIEW     CLINICAL HISTORY:  Shortness of breath     TECHNIQUE:  Four views of the chest     COMPARISON:  2017     FINDINGS:  Heart size and mediastinal contour are normal.  Lungs are clear bilaterally.  Multilevel degenerative change in the spine.     IMPRESSION:      No acute disease.        Electronically signed by: NABEEL Davies MD  Date:                                            07/10/2018  Time:                                            08:56                    Signed By:  Sergei Davies MD on 7/10/2018  8:56 AM       Pulmonary function tests: Mild restriction    Office Spirometry Results:      Results for RICK SAMAYOA (MRN [id number]) as of 7/10/2018 11:00   Ref. Range 7/10/2018 09:13   POC PH Latest Ref Range: 7.35 - 7.45  7.403   POC PCO2 Latest Ref Range: 35 - 45 mmHg 39.7   POC PO2 Latest Ref Range: 80 - 100 mmHg 66 (L)   POC BE Latest Ref Range: -2 to 2 mmol/L 0   POC HCO3 Latest Ref Range: 24 - 28 mmol/L 24.8   POC SATURATED O2 Latest Ref Range: 95 - 100 % 93 (L)   FiO2 Unknown 21   Sample Unknown ARTERIAL   DelSys Unknown Room Air   Allens Test Unknown Pass   Site Unknown LR   Mode Unknown SPONT         No flowsheet data found.  Pulmonary Studies Review 7/10/2018   SpO2 94   Height 63.000   Weight 3110.4   BMI (Calculated) 34.5   Predicted Distance 266.28   Predicted Distance Meters (Calculated) 409.63         Assessment:       1. Pre-operative respiratory examination    2. Restrictive lung disease    3. Wheezing        Outpatient Encounter Prescriptions as of 7/10/2018   Medication Sig Dispense Refill    ACCU-CHEK FASTCLIX Misc USE ONE TIME DAILY 100 each 3    ACCU-CHEK SMARTVIEW TEST STRIP Strp TEST ONE TIME DAILY 100 strip 3    albuterol 90 mcg/actuation inhaler Inhale 2 puffs into the lungs every 4 to 6 hours as needed for Wheezing. 1 Inhaler 11    amitriptyline (ELAVIL) 100 MG tablet TAKE 1 TABLET NIGHTLY 90 tablet 3    b complex vitamins tablet Take 1 tablet by mouth every evening.       blood-glucose meter kit Use as instructed 1 each 0    calcium citrate (CALCITRATE) 200 mg (950 mg) tablet Take 1 tablet by mouth once daily. Ca 630 mg/Vit D 500IU - Pt takes 1 tab three times daily      ciclopirox (PENLAC) 8 % Soln Apply to affected toenails at night time DAILY. On 7th day, file nails down, clean all nails with alcohol and restart application process. 1 Bottle 10    cyanocobalamin, vitamin B-12, (VITAMIN  B-12) 50 mcg tablet Take 50 mcg by mouth once daily.      eszopiclone 3 mg Tab TAKE 1 TABLET EVERY NIGHT AS NEEDED 90 tablet 0    ferrous sulfate 325 (65 FE) MG EC tablet Take 18 mg by mouth once daily.       metFORMIN (GLUCOPHAGE-XR) 500 MG 24 hr tablet Take 2 tablets (1,000 mg total) by mouth once daily. 180 tablet 3    metoprolol succinate (TOPROL-XL) 50 MG 24 hr tablet TAKE 1 TABLET EVERY DAY 90 tablet 1    multivitamin (ONE DAILY MULTIVITAMIN) per tablet Take 1 tablet by mouth 2 (two) times daily.       omeprazole (PRILOSEC) 20 MG capsule TAKE 1 CAPSULE EVERY DAY 90 capsule 1    pravastatin (PRAVACHOL) 40 MG tablet TAKE 1 TABLET EVERY DAY 90 tablet 1    [DISCONTINUED] albuterol 90 mcg/actuation inhaler Inhale 2 puffs into the lungs every 4 to 6 hours as needed for Wheezing. 1 Inhaler 11     No facility-administered encounter medications on file as of 7/10/2018.      Orders Placed This Encounter   Procedures    X-Ray Chest PA And Lateral     Standing Status:   Future     Standing Expiration Date:   1/10/2020     Order Specific Question:   Reason for Exam:     Answer:   SOB    Spirometry with/without bronchodilator     Standing Status:   Future     Standing Expiration Date:   7/10/2019     Plan:       Requested Prescriptions     Signed Prescriptions Disp Refills    albuterol 90 mcg/actuation inhaler 1 Inhaler 11     Sig: Inhale 2 puffs into the lungs every 4 to 6 hours as needed for Wheezing.     Pre-operative respiratory examination    Restrictive lung disease  -     X-Ray Chest PA And Lateral; Future; Expected date: 07/10/2018  -     Spirometry with/without bronchodilator; Future; Expected date: 01/10/2019    Wheezing  -     albuterol 90 mcg/actuation inhaler; Inhale 2 puffs into the lungs every 4 to 6 hours as needed for Wheezing.  Dispense: 1 Inhaler; Refill: 11    Clearance for surgery:  The patient is at an increased risk of complications from surgery due to multiple medical problems including  restrictive lung disease. Vaccination status reviewed and updated. Risks of possible complications of surgery discussed in detail including risk of respiratory failure requiring mechanical ventilation, post operative pneumonia, deep venous thrombosis, pulmonary embolism and death.  Pulmonary function studies, arterial blood gases and chest X ray reports are independently reviewed. Methods of improving lung function prior to surgery including incentive spirometry, regular use of bronchodilators, exercise and respiratory toilet discussed. Patient voices understanding of increased risks involved due to compromised pulmonary status and the need to comply with treatment plan prior to surgery.  The patient is cleared for anesthesia and surgery from a pulmonary standpoint.         Follow-up in about 1 year (around 7/10/2019) for Review ami and CXR.    MEDICAL DECISION MAKING: Moderate to high complexity.  Overall, the multiple problems listed are of moderate to high severity that may impact quality of life and activities of daily living. Side effects of medications, treatment plan as well as options and alternatives reviewed and discussed with patient. There was counseling of patient concerning these issues.    Total time spent in face to face counseling and coordination of care - 45  minutes over 50% of time was used in discussion of prognosis, risks, benefits of treatment, instructions and compliance with regimen . Discussion with other physicians or health care providers (DME, NP, pharmacy, respiratory therapy) occurred.

## 2018-07-10 NOTE — LETTER
July 10, 2018      Jayro Pedraza Sr., MD  [address] [facility] [place] [address]           [facility]  [address] [address] [place] [address]  Phone: [phone]  Fax: [phone]          Patient: Cassandra Campos   MR Number: [id number]   YOB: 1950   Date of Visit: 7/10/2018       Dear No ref. provider found:    Thank you for referring Cassandra Campos to me for evaluation. Attached you will find relevant portions of my assessment and plan of care.    If you have questions, please do not hesitate to call me. I look forward to following Cassandra Campos along with you.    Sincerely,    Nixon Mcdermott MD    Enclosure  CC:  Denia Maldonado MD    IIf you would like to receive this communication electronically, please contact [email or web address] or [phone] to request [employer] Link access.    [employer] Link is a tool which provides read-only access to select patient information with whom you have a relationship. Its easy to use and provides real time access to review your patients record including encounter summaries, notes, results, and demographic information.    If you feel you have received this communication in error or would no longer like to receive these types of communications, please e-mail [email or web address]

## 2018-07-11 ENCOUNTER — PATIENT MESSAGE (OUTPATIENT)
Dept: INTERNAL MEDICINE | Facility: CLINIC | Age: 68
End: 2018-07-11

## 2018-07-11 LAB
BRPFT: ABNORMAL
DLCO ADJ PRE: 12.74 ML/(MIN*MMHG) (ref 15.22–26.68)
DLCO PRE: 12.86 ML/(MIN*MMHG) (ref 15.22–26.68)
DLCO SINGLE BREATH LLN: 15.22
DLCO SINGLE BREATH PRE REF: 61.4 %
DLCO SINGLE BREATH REF: 20.95
DLCOC SBVA LLN: 2.88
DLCOC SBVA PRE REF: 121.7 %
DLCOC SBVA REF: 4.39
DLCOC SINGLE BREATH LLN: 15.22
DLCOC SINGLE BREATH PRE REF: 60.8 %
DLCOC SINGLE BREATH REF: 20.95
DLCOVA LLN: 2.88
DLCOVA PRE REF: 122.8 %
DLCOVA PRE: 5.39 ML/(MIN*MMHG*L) (ref 2.88–5.9)
DLCOVA REF: 4.39
DLVAADJ PRE: 5.34 ML/(MIN*MMHG*L) (ref 2.88–5.9)
ERV LLN: 0.67
ERV PRE REF: 9.4 %
ERV PRE: 0.06 L (ref 0.67–0.67)
ERV REF: 0.67
ERVN2 LLN: 0.67
ERVN2 REF: 0.67
FEF 25 75 CHG: -23.6 %
FEF 25 75 LLN: 0.63
FEF 25 75 POST REF: 72.8 %
FEF 25 75 POST: 1.2 L/S (ref 0.63–2.66)
FEF 25 75 PRE REF: 95.2 %
FEF 25 75 PRE: 1.57 L/S (ref 0.63–2.66)
FEF 25 75 REF: 1.65
FET100 CHG: 7.1 %
FET100 POST: 7.74 SEC
FET100 PRE: 7.23 SEC
FEV1 CHG: -0.9 %
FEV1 FVC CHG: -11.9 %
FEV1 FVC LLN: 66
FEV1 FVC POST REF: 96.4 %
FEV1 FVC POST: 76.02 % (ref 66.03–91.61)
FEV1 FVC PRE REF: 109.5 %
FEV1 FVC PRE: 86.3 % (ref 66.03–91.61)
FEV1 FVC REF: 79
FEV1 LLN: 1.31
FEV1 POST REF: 73.2 %
FEV1 POST: 1.37 L (ref 1.31–2.42)
FEV1 PRE REF: 73.9 %
FEV1 PRE: 1.38 L (ref 1.31–2.42)
FEV1 REF: 1.87
FEV6 CHG: 12 %
FEV6 LLN: 1.55
FEV6 POST REF: 80.4 %
FEV6 POST: 1.79 L (ref 1.55–2.9)
FEV6 PRE REF: 71.8 %
FEV6 PRE: 1.6 L (ref 1.55–2.9)
FEV6 REF: 2.23
FRCN2 LLN: 1.83
FRCN2 REF: 2.65
FRCPL PRE: 1.54 L
FRCPLETH LLN: 1.83
FRCPLETH PREREF: 58 %
FRCPLETH REF: 2.65
FVC CHG: 12.5 %
FVC LLN: 1.69
FVC POST REF: 75.5 %
FVC POST: 1.8 L (ref 1.69–3.07)
FVC PRE REF: 67.1 %
FVC PRE: 1.6 L (ref 1.69–3.07)
FVC REF: 2.38
IVC PRE: 1.6 L (ref 1.69–3.07)
IVC SINGLE BREATH LLN: 1.69
IVC SINGLE BREATH PRE REF: 67.4 %
IVC SINGLE BREATH REF: 2.38
MVV LLN: 69
MVV REF: 81
PEF CHG: -22.2 %
PEF LLN: 2.91
PEF POST REF: 87.5 %
PEF POST: 4.24 L/S (ref 2.91–6.79)
PEF PRE REF: 112.4 %
PEF PRE: 5.45 L/S (ref 2.91–6.79)
PEF REF: 4.85
RAW LLN: 3.06
RAW PRE REF: 265.2 %
RAW PRE: 8.11 CMH2O*S/L (ref 3.06–3.06)
RAW REF: 3.06
RV LLN: 1.41
RV PRE REF: 73.9 %
RV PRE: 1.47 L (ref 1.41–2.56)
RV REF: 1.98
RVN2 LLN: 1.41
RVN2 REF: 1.98
RVN2TLCN2 LLN: 32
RVN2TLCN2 REF: 42
RVTLC LLN: 32
RVTLC PRE REF: 105.1 %
RVTLC PRE: 44.23 % (ref 32.49–51.67)
RVTLC REF: 42
TLC LLN: 3.78
TLC PRE REF: 69.5 %
TLC PRE: 3.32 L (ref 3.78–5.76)
TLC REF: 4.77
TLCN2 LLN: 3.78
TLCN2 REF: 4.77
VA PRE: 2.39 L (ref 4.62–4.62)
VA SINGLE BREATH LLN: 4.62
VA SINGLE BREATH PRE REF: 51.7 %
VA SINGLE BREATH REF: 4.62
VC LLN: 1.69
VC PRE REF: 77.7 %
VC PRE: 1.85 L (ref 1.69–3.07)
VC REF: 2.38
VCMAXN2 LLN: 1.69
VCMAXN2 REF: 2.38
VTGRAWPRE: 1.91 L

## 2018-07-17 ENCOUNTER — OFFICE VISIT (OUTPATIENT)
Dept: INTERNAL MEDICINE | Facility: CLINIC | Age: 68
End: 2018-07-17
Payer: MEDICARE

## 2018-07-17 VITALS
RESPIRATION RATE: 18 BRPM | BODY MASS INDEX: 34.59 KG/M2 | HEART RATE: 84 BPM | WEIGHT: 195.19 LBS | SYSTOLIC BLOOD PRESSURE: 102 MMHG | DIASTOLIC BLOOD PRESSURE: 76 MMHG | OXYGEN SATURATION: 95 % | HEIGHT: 63 IN | TEMPERATURE: 98 F

## 2018-07-17 DIAGNOSIS — Z01.818 PREOPERATIVE GENERAL PHYSICAL EXAMINATION: ICD-10-CM

## 2018-07-17 DIAGNOSIS — J98.4 RESTRICTIVE LUNG DISEASE: ICD-10-CM

## 2018-07-17 DIAGNOSIS — E11.9 CONTROLLED TYPE 2 DIABETES MELLITUS WITHOUT COMPLICATION, WITHOUT LONG-TERM CURRENT USE OF INSULIN: Primary | ICD-10-CM

## 2018-07-17 DIAGNOSIS — I47.10 PAROXYSMAL SVT (SUPRAVENTRICULAR TACHYCARDIA): ICD-10-CM

## 2018-07-17 DIAGNOSIS — R39.9 URINARY SYMPTOM OR SIGN: ICD-10-CM

## 2018-07-17 LAB
BILIRUB SERPL-MCNC: NORMAL MG/DL
BLOOD URINE, POC: NORMAL
COLOR, POC UA: NORMAL
GLUCOSE UR QL STRIP: NORMAL
KETONES UR QL STRIP: NORMAL
LEUKOCYTE ESTERASE URINE, POC: NORMAL
NITRITE, POC UA: NORMAL
PH, POC UA: 8
PROTEIN, POC: NORMAL
SPECIFIC GRAVITY, POC UA: 1
UROBILINOGEN, POC UA: NORMAL

## 2018-07-17 PROCEDURE — 81002 URINALYSIS NONAUTO W/O SCOPE: CPT | Mod: S$GLB,,, | Performed by: FAMILY MEDICINE

## 2018-07-17 PROCEDURE — 99499 UNLISTED E&M SERVICE: CPT | Mod: S$GLB,,, | Performed by: FAMILY MEDICINE

## 2018-07-17 PROCEDURE — 3044F HG A1C LEVEL LT 7.0%: CPT | Mod: CPTII,S$GLB,, | Performed by: FAMILY MEDICINE

## 2018-07-17 PROCEDURE — 3078F DIAST BP <80 MM HG: CPT | Mod: CPTII,S$GLB,, | Performed by: FAMILY MEDICINE

## 2018-07-17 PROCEDURE — 99999 PR PBB SHADOW E&M-EST. PATIENT-LVL III: CPT | Mod: PBBFAC,,, | Performed by: FAMILY MEDICINE

## 2018-07-17 PROCEDURE — 99214 OFFICE O/P EST MOD 30 MIN: CPT | Mod: 25,S$GLB,, | Performed by: FAMILY MEDICINE

## 2018-07-17 PROCEDURE — 3074F SYST BP LT 130 MM HG: CPT | Mod: CPTII,S$GLB,, | Performed by: FAMILY MEDICINE

## 2018-07-17 NOTE — PROGRESS NOTES
Subjective:       Patient ID: Cassandra Campos is a 67 y.o. female.    Chief Complaint: Pre-op Exam    She has type 2 diabetes, controlled, essential hypertension, hyperlipidemia, GERD, insomnia, restrictive pulmonary disease, history of SVT.  She is due for her regular DM labs in August.  She is scheduled for right olecranon bursectomy with Dr. Pedraza for July 27th.  Labs, chest x-ray, EKG are reviewed.  Lab Results       Component                Value               Date                       WBC                      6.82                07/02/2018                 HGB                      13.7                07/02/2018                 HCT                      40.7                07/02/2018                 PLT                      233                 07/02/2018                 CHOL                     169                 08/01/2017                 TRIG                     165 (H)             08/01/2017                 HDL                      40                  08/01/2017                 LDLCALC                  96.0                08/01/2017                 ALT                      70 (H)              07/02/2018                 AST                      38                  07/02/2018                 NA                       139                 07/02/2018                 K                        3.9                 07/02/2018                 CL                       105                 07/02/2018                 CALCIUM                  10.0                07/02/2018                 CREATININE               0.8                 07/02/2018                 BUN                      20                  07/02/2018                 CO2                      28                  07/02/2018                 TSH                      2.386               11/28/2016                 GLU                      122 (H)             07/02/2018                 ESTGFRAFRICA             >60                 07/02/2018                 EGFRNONAA                 >60                 07/02/2018                 HGBA1C                   5.3                 02/12/2018                 MICALBCREAT              12.9                08/01/2017            Pulmonary evaluation was reviewed.  She was instructed to do inspiration spirometry, pulmonary toilet prior to surgery. Pt sts she was told to do 2 puffs alb prior to her walk in the days/weeks prior to susrgery.  She is having some problems with urination current - will check a dip.      Review of Systems   Constitutional: Negative for activity change, fever and unexpected weight change.   HENT: Positive for hearing loss (chronic). Negative for congestion, dental problem, rhinorrhea, sore throat and trouble swallowing.    Eyes: Positive for visual disturbance (not new - sees opto). Negative for discharge.   Respiratory: Positive for chest tightness (not a problem - sts walknig with no problem - ), shortness of breath (sometimes - has been told to use albuterol prior to walking) and wheezing (unaware of any wheezing =- has been told she wheezes).    Cardiovascular: Positive for palpitations (she has hx of SVT but does not feel any palpitations). Negative for chest pain.   Gastrointestinal: Positive for constipation (uses fiber supplement - last stool yesterday). Negative for abdominal pain, blood in stool, diarrhea, nausea and vomiting.   Endocrine: Positive for polyuria. Negative for polydipsia.   Genitourinary: Positive for difficulty urinating (hard time getting stream started sometimes) and vaginal discharge (always - no change - no odor). Negative for dysuria, frequency, hematuria and menstrual problem.   Musculoskeletal: Negative for arthralgias, joint swelling and neck pain.   Neurological: Negative for weakness and headaches.   Psychiatric/Behavioral: Positive for sleep disturbance (uses meds and sleeps OK). Negative for confusion and dysphoric mood.       Objective:      Physical Exam   Constitutional: She is  oriented to person, place, and time. She appears well-developed and well-nourished.   HENT:   Head: Normocephalic and atraumatic.   Right Ear: Tympanic membrane, external ear and ear canal normal.   Left Ear: Tympanic membrane, external ear and ear canal normal.   Nose: Nose normal.   Mouth/Throat: Oropharynx is clear and moist. No oropharyngeal exudate.   Eyes: Conjunctivae and EOM are normal.   Neck: Normal range of motion. Neck supple. No thyromegaly present.   Cardiovascular: Normal rate, regular rhythm and normal heart sounds.  Exam reveals no gallop and no friction rub.    No murmur heard.  Pulmonary/Chest: Effort normal and breath sounds normal. She exhibits no tenderness.   Abdominal: Soft. She exhibits no distension. There is no tenderness.   obese   Musculoskeletal: She exhibits no edema.   Lymphadenopathy:     She has no cervical adenopathy.   Neurological: She is alert and oriented to person, place, and time.   Skin: Skin is warm and dry.   Psychiatric: She has a normal mood and affect. Her behavior is normal.         Assessment/Plan:     1. Controlled type 2 diabetes mellitus without complication, without long-term current use of insulin     2. Paroxysmal SVT (supraventricular tachycardia)     3. Restrictive lung disease     4. Urinary symptom or sign  POCT urine dipstick without microscope   5. Preoperative general physical examination     DM2 controlled, PSVT controlled on BB. Urine dip negative.  All testing reviewed -  she has no contraindications to proposed surgical procedure. She has increased risk due to pulmonary issues, in addition to chronic diseases, as outlined by Dr Kiser.  She is informed she may take her metformin the night prior to surgery but to skip it the evening following surgery.  Will recheck with DM labs in August.

## 2018-07-18 DIAGNOSIS — I15.9 SECONDARY HYPERTENSION: ICD-10-CM

## 2018-07-18 RX ORDER — METOPROLOL SUCCINATE 50 MG/1
TABLET, EXTENDED RELEASE ORAL
Qty: 90 TABLET | Refills: 1 | Status: SHIPPED | OUTPATIENT
Start: 2018-07-18 | End: 2018-12-13 | Stop reason: SDUPTHER

## 2018-07-23 NOTE — PRE ADMISSION SCREENING
Pre op instructions reviewed with patient per phone:    To confirm, Your surgeon has instructed you:  Surgery is scheduled 07/25/18 at 1215      Please report to [facility] 1st floor main lobby by 1045.   Pre admit office to call afternoon prior to surgery with final arrival time      INSTRUCTIONS IMPORTANT!!!  ¨ Do not eat, drink, or smoke after 12 midnight-including water. OK to brush teeth, no gum, candy or mints!    ¨ Take only these medicines with a small swallow of water-morning of surgery.  Use Albuterol inhaler    Remove all artificial nails/polish prior to surgery      ____  Do not wear makeup, including mascara.  ____  No powder, lotions or creams to surgical area.  ____  Please remove all jewelry, including piercings and leave at home.  ____  No money or valuables needed. Please leave at home.  ____  Please bring identification and insurance information to hospital.  ____  If going home the same day, arrange for a ride home. You will not be able to   drive if Anesthesia was used.  ____  Children, under 12 years old, must remain in the waiting room with an adult.  They are not allowed in patient areas.  ____  Wear loose fitting clothing. Allow for dressings, bandages.  ____  Stop Aspirin, Ibuprofen, Motrin and Aleve at least 5-7 days before surgery, unless otherwise instructed by your doctor, or the nurse.   You MAY use Tylenol/acetaminophen until day of surgery.  ____  If you take diabetic medication, do not take am of surgery unless instructed by   Doctor.  ____ Stop taking any Fish Oil supplement or any Vitamins that contain Vitamin E at least 5 days prior to surgery.          Bathing Instructions-- The night before surgery and the morning prior to coming to the hospital:   -Do not shave the surgical area.   -Shower and wash your hair and body as usual with anti-bacterial  soap and shampoo.   -Rinse your hair and body completely.   -Use one packet of hibiclens to wash the  surgical site (using your hand) gently for 5 minutes.  Do not scrub you skin too hard.   -Do not use hibiclens on your head, face, or genitals.   -Do not wash with anti-bacterial soap after you use the hibiclens.   -Rinse your body thoroughly.   -Dry with clean, soft towel.  Do not use lotion, cream, deodorant, or powders on   the surgical site.    Use antibacterial soap in place of hibiclens if your surgery is on the head, face or genitals.         Surgical Site Infection    Prevention of surgical site infections:     -Keep incisions clean and dry.   -Do not soak/submerge incisions in water until completely healed.   -Do not apply lotions, powders, creams, or deodorants to site.   -Always make sure hands are cleaned with antibacterial soap/ alcohol-based   prior to touching the surgical site.  (This includes doctors, nurses, staff, and yourself.)    Signs and symptoms:   -Redness and pain around the area where you had surgery   -Drainage of cloudy fluid from your surgical wound   -Fever over 100.4  I have read or had read and explained to me, and understand the above information.

## 2018-07-24 ENCOUNTER — ANESTHESIA EVENT (OUTPATIENT)
Dept: SURGERY | Facility: HOSPITAL | Age: 68
End: 2018-07-24
Payer: MEDICARE

## 2018-07-25 ENCOUNTER — HOSPITAL ENCOUNTER (OUTPATIENT)
Facility: HOSPITAL | Age: 68
Discharge: HOME | End: 2018-07-25
Attending: ORTHOPAEDIC SURGERY | Admitting: ORTHOPAEDIC SURGERY
Payer: MEDICARE

## 2018-07-25 ENCOUNTER — SURGERY (OUTPATIENT)
Age: 68
End: 2018-07-25

## 2018-07-25 ENCOUNTER — ANESTHESIA (OUTPATIENT)
Dept: SURGERY | Facility: HOSPITAL | Age: 68
End: 2018-07-25
Payer: MEDICARE

## 2018-07-25 DIAGNOSIS — Z98.890 POST-OPERATIVE STATE: ICD-10-CM

## 2018-07-25 DIAGNOSIS — M70.21 OLECRANON BURSITIS OF RIGHT ELBOW: Primary | ICD-10-CM

## 2018-07-25 LAB — POCT GLUCOSE: 106 MG/DL (ref 70–110)

## 2018-07-25 PROCEDURE — 25000003 PHARM REV CODE 250: Performed by: ANESTHESIOLOGY

## 2018-07-25 PROCEDURE — 63600175 PHARM REV CODE 636 W HCPCS: Performed by: NURSE ANESTHETIST, CERTIFIED REGISTERED

## 2018-07-25 PROCEDURE — 37000009 HC ANESTHESIA EA ADD 15 MINS: Performed by: ORTHOPAEDIC SURGERY

## 2018-07-25 PROCEDURE — 71000015 HC POSTOP RECOV 1ST HR: Performed by: ORTHOPAEDIC SURGERY

## 2018-07-25 PROCEDURE — 88305 TISSUE EXAM BY PATHOLOGIST: CPT | Mod: 26,,, | Performed by: PATHOLOGY

## 2018-07-25 PROCEDURE — 24105 EXCISION OLECRANON BURSA: CPT | Mod: RT,,, | Performed by: ORTHOPAEDIC SURGERY

## 2018-07-25 PROCEDURE — 36000707: Performed by: ORTHOPAEDIC SURGERY

## 2018-07-25 PROCEDURE — 25000003 PHARM REV CODE 250: Performed by: ORTHOPAEDIC SURGERY

## 2018-07-25 PROCEDURE — 36000706: Performed by: ORTHOPAEDIC SURGERY

## 2018-07-25 PROCEDURE — 25000003 PHARM REV CODE 250: Performed by: NURSE ANESTHETIST, CERTIFIED REGISTERED

## 2018-07-25 PROCEDURE — 63600175 PHARM REV CODE 636 W HCPCS: Performed by: ANESTHESIOLOGY

## 2018-07-25 PROCEDURE — 71000033 HC RECOVERY, INTIAL HOUR: Performed by: ORTHOPAEDIC SURGERY

## 2018-07-25 PROCEDURE — 88305 TISSUE EXAM BY PATHOLOGIST: CPT | Performed by: PATHOLOGY

## 2018-07-25 PROCEDURE — 37000008 HC ANESTHESIA 1ST 15 MINUTES: Performed by: ORTHOPAEDIC SURGERY

## 2018-07-25 PROCEDURE — 63600175 PHARM REV CODE 636 W HCPCS: Performed by: ORTHOPAEDIC SURGERY

## 2018-07-25 RX ORDER — PROPOFOL 10 MG/ML
VIAL (ML) INTRAVENOUS
Status: DISCONTINUED | OUTPATIENT
Start: 2018-07-25 | End: 2018-07-25

## 2018-07-25 RX ORDER — LIDOCAINE HYDROCHLORIDE 10 MG/ML
1 INJECTION, SOLUTION EPIDURAL; INFILTRATION; INTRACAUDAL; PERINEURAL ONCE
Status: DISCONTINUED | OUTPATIENT
Start: 2018-07-25 | End: 2018-07-25 | Stop reason: HOSPADM

## 2018-07-25 RX ORDER — ROCURONIUM BROMIDE 10 MG/ML
INJECTION, SOLUTION INTRAVENOUS
Status: DISCONTINUED | OUTPATIENT
Start: 2018-07-25 | End: 2018-07-25

## 2018-07-25 RX ORDER — CHLORHEXIDINE GLUCONATE ORAL RINSE 1.2 MG/ML
10 SOLUTION DENTAL 2 TIMES DAILY
Status: DISCONTINUED | OUTPATIENT
Start: 2018-07-25 | End: 2018-07-25 | Stop reason: HOSPADM

## 2018-07-25 RX ORDER — BUPIVACAINE HYDROCHLORIDE 2.5 MG/ML
INJECTION, SOLUTION EPIDURAL; INFILTRATION; INTRACAUDAL
Status: DISCONTINUED | OUTPATIENT
Start: 2018-07-25 | End: 2018-07-25 | Stop reason: HOSPADM

## 2018-07-25 RX ORDER — ONDANSETRON 2 MG/ML
INJECTION INTRAMUSCULAR; INTRAVENOUS
Status: DISCONTINUED | OUTPATIENT
Start: 2018-07-25 | End: 2018-07-25

## 2018-07-25 RX ORDER — ONDANSETRON 2 MG/ML
4 INJECTION INTRAMUSCULAR; INTRAVENOUS DAILY PRN
Status: DISCONTINUED | OUTPATIENT
Start: 2018-07-25 | End: 2018-07-25 | Stop reason: HOSPADM

## 2018-07-25 RX ORDER — SODIUM CHLORIDE 9 MG/ML
INJECTION, SOLUTION INTRAVENOUS CONTINUOUS
Status: DISCONTINUED | OUTPATIENT
Start: 2018-07-25 | End: 2018-07-25 | Stop reason: HOSPADM

## 2018-07-25 RX ORDER — HYDROCODONE BITARTRATE AND ACETAMINOPHEN 10; 325 MG/1; MG/1
1 TABLET ORAL EVERY 4 HOURS PRN
Qty: 60 TABLET | Refills: 0 | Status: SHIPPED | OUTPATIENT
Start: 2018-07-25 | End: 2018-08-04

## 2018-07-25 RX ORDER — HYDROMORPHONE HYDROCHLORIDE 1 MG/ML
0.2 INJECTION, SOLUTION INTRAMUSCULAR; INTRAVENOUS; SUBCUTANEOUS EVERY 5 MIN PRN
Status: DISCONTINUED | OUTPATIENT
Start: 2018-07-25 | End: 2018-07-25 | Stop reason: HOSPADM

## 2018-07-25 RX ORDER — SODIUM CHLORIDE 0.9 % (FLUSH) 0.9 %
3 SYRINGE (ML) INJECTION
Status: DISCONTINUED | OUTPATIENT
Start: 2018-07-25 | End: 2018-07-25 | Stop reason: HOSPADM

## 2018-07-25 RX ORDER — FENTANYL CITRATE 50 UG/ML
INJECTION, SOLUTION INTRAMUSCULAR; INTRAVENOUS
Status: DISCONTINUED | OUTPATIENT
Start: 2018-07-25 | End: 2018-07-25

## 2018-07-25 RX ORDER — OXYCODONE HYDROCHLORIDE 5 MG/1
5 TABLET ORAL
Status: DISCONTINUED | OUTPATIENT
Start: 2018-07-25 | End: 2018-07-25 | Stop reason: HOSPADM

## 2018-07-25 RX ORDER — CEFAZOLIN SODIUM 2 G/50ML
2 SOLUTION INTRAVENOUS
Status: DISCONTINUED | OUTPATIENT
Start: 2018-07-25 | End: 2018-07-25 | Stop reason: HOSPADM

## 2018-07-25 RX ORDER — HYDROCODONE BITARTRATE AND ACETAMINOPHEN 5; 325 MG/1; MG/1
1 TABLET ORAL EVERY 4 HOURS PRN
Status: DISCONTINUED | OUTPATIENT
Start: 2018-07-25 | End: 2018-07-25 | Stop reason: HOSPADM

## 2018-07-25 RX ORDER — CHLORHEXIDINE GLUCONATE ORAL RINSE 1.2 MG/ML
10 SOLUTION DENTAL
Status: DISCONTINUED | OUTPATIENT
Start: 2018-07-25 | End: 2018-07-25 | Stop reason: HOSPADM

## 2018-07-25 RX ORDER — LIDOCAINE HYDROCHLORIDE 10 MG/ML
INJECTION INFILTRATION; PERINEURAL
Status: DISCONTINUED | OUTPATIENT
Start: 2018-07-25 | End: 2018-07-25

## 2018-07-25 RX ORDER — SODIUM CHLORIDE, SODIUM LACTATE, POTASSIUM CHLORIDE, CALCIUM CHLORIDE 600; 310; 30; 20 MG/100ML; MG/100ML; MG/100ML; MG/100ML
INJECTION, SOLUTION INTRAVENOUS CONTINUOUS PRN
Status: DISCONTINUED | OUTPATIENT
Start: 2018-07-25 | End: 2018-07-25

## 2018-07-25 RX ORDER — ACETAMINOPHEN 10 MG/ML
1000 INJECTION, SOLUTION INTRAVENOUS ONCE
Status: COMPLETED | OUTPATIENT
Start: 2018-07-25 | End: 2018-07-25

## 2018-07-25 RX ORDER — LIDOCAINE HYDROCHLORIDE 10 MG/ML
INJECTION, SOLUTION EPIDURAL; INFILTRATION; INTRACAUDAL; PERINEURAL
Status: DISCONTINUED | OUTPATIENT
Start: 2018-07-25 | End: 2018-07-25 | Stop reason: HOSPADM

## 2018-07-25 RX ADMIN — Medication 0.2 MG: at 02:07

## 2018-07-25 RX ADMIN — ONDANSETRON 4 MG: 2 INJECTION, SOLUTION INTRAMUSCULAR; INTRAVENOUS at 12:07

## 2018-07-25 RX ADMIN — PROPOFOL 100 MG: 10 INJECTION, EMULSION INTRAVENOUS at 12:07

## 2018-07-25 RX ADMIN — ROCURONIUM BROMIDE 20 MG: 10 INJECTION, SOLUTION INTRAVENOUS at 12:07

## 2018-07-25 RX ADMIN — CEFAZOLIN SODIUM 2 G: 2 SOLUTION INTRAVENOUS at 12:07

## 2018-07-25 RX ADMIN — SODIUM CHLORIDE, SODIUM LACTATE, POTASSIUM CHLORIDE, AND CALCIUM CHLORIDE: 600; 310; 30; 20 INJECTION, SOLUTION INTRAVENOUS at 12:07

## 2018-07-25 RX ADMIN — LIDOCAINE HYDROCHLORIDE 5 ML: 10 INJECTION, SOLUTION EPIDURAL; INFILTRATION; INTRACAUDAL; PERINEURAL at 12:07

## 2018-07-25 RX ADMIN — LIDOCAINE HYDROCHLORIDE 50 MG: 10 INJECTION, SOLUTION INFILTRATION; PERINEURAL at 12:07

## 2018-07-25 RX ADMIN — BUPIVACAINE HYDROCHLORIDE 10 ML: 2.5 INJECTION, SOLUTION EPIDURAL; INFILTRATION; INTRACAUDAL; PERINEURAL at 12:07

## 2018-07-25 RX ADMIN — Medication 0.2 MG: at 01:07

## 2018-07-25 RX ADMIN — FENTANYL CITRATE 100 MCG: 50 INJECTION, SOLUTION INTRAMUSCULAR; INTRAVENOUS at 12:07

## 2018-07-25 RX ADMIN — ACETAMINOPHEN 1000 MG: 10 INJECTION, SOLUTION INTRAVENOUS at 02:07

## 2018-07-25 RX ADMIN — OXYCODONE HYDROCHLORIDE 5 MG: 5 TABLET ORAL at 02:07

## 2018-07-25 NOTE — DISCHARGE SUMMARY
[facility] -   Brief Operative Note     SUMMARY     Surgery Date: 7/25/2018     Surgeon(s) and Role:     * Jayro Pedraza Sr., MD - Primary    Assisting Surgeon: None    Pre-op Diagnosis:  Olecranon bursitis of right elbow [M70.21]    Post-op Diagnosis:  Post-Op Diagnosis Codes:     * Olecranon bursitis of right elbow [M70.21]    Procedure(s) (LRB):  BURSECTOMY, OLECRANON-RIGHT (Right)  EXCISION, EXOSTOSIS (Right)    Anesthesia: General    Description of the findings of the procedure:   Right elbow olecranon bursitis, exostosis of the olecranon.    Findings/Key Components:   Right elbow olecranon bursitis, exostosis of the olecranon.    Estimated Blood Loss: 1 cc         Specimens:   Specimen (12h ago through future)    Start     Ordered    07/25/18 1308  Specimen to Pathology - Surgery  Once     Comments:  1. Right elbow bursa Dx: Olecranon bursitis of right elbow      07/25/18 1308          Discharge Note    SUMMARY     Admit Date: 7/25/2018    Discharge Date and Time:  07/25/2018 1:35 PM    Hospital Course (synopsis of major diagnoses, care, treatment, and services provided during the course of the hospital stay):  Without complication     Final Diagnosis: Post-Op Diagnosis Codes:     * Olecranon bursitis of right elbow [M70.21]   Right elbow olecranon bursitis, exostosis of the olecranon.    Disposition: Home or Self Care    Follow Up/Patient Instructions:  Follow-up in 2 weeks, daily dressing changes with Betadine gauze and an Ace wrap    Medications:  Central.  Reconciled Home Medications:      Medication List      START taking these medications    HYDROcodone-acetaminophen  mg per tablet  Commonly known as:  NORCO  Take 1 tablet by mouth every 4 (four) hours as needed.        CHANGE how you take these medications    metFORMIN 500 MG 24 hr tablet  Commonly known as:  GLUCOPHAGE-XR  Take 2 tablets (1,000 mg total) by mouth once daily.  What changed:  when to take this     metoprolol succinate  50 MG 24 hr tablet  Commonly known as:  TOPROL-XL  TAKE 1 TABLET EVERY DAY  What changed:  See the new instructions.     omeprazole 20 MG capsule  Commonly known as:  PRILOSEC  TAKE 1 CAPSULE EVERY DAY  What changed:  See the new instructions.     pravastatin 40 MG tablet  Commonly known as:  PRAVACHOL  TAKE 1 TABLET EVERY DAY  What changed:  See the new instructions.        CONTINUE taking these medications    ACCU-CHEK FASTCLIX LANCING DEV Misc  Generic drug:  lancets  USE ONE TIME DAILY     ACCU-CHEK SMARTVIEW TEST STRIP Strp  Generic drug:  blood sugar diagnostic  TEST ONE TIME DAILY     albuterol 90 mcg/actuation inhaler  Inhale 2 puffs into the lungs every 4 to 6 hours as needed for Wheezing.     amitriptyline 100 MG tablet  Commonly known as:  ELAVIL  TAKE 1 TABLET NIGHTLY     blood-glucose meter kit  Use as instructed     ciclopirox 8 % Soln  Commonly known as:  PENLAC  Apply to affected toenails at night time DAILY. On 7th day, file nails down, clean all nails with alcohol and restart application process.     eszopiclone 3 mg Tab  TAKE 1 TABLET EVERY NIGHT AS NEEDED     ONE DAILY MULTIVITAMIN per tablet  Generic drug:  multivitamin  Take 1 tablet by mouth nightly.            Discharge Procedure Orders  Diet general     Call MD for:  temperature >100.4     Call MD for:  persistent nausea and vomiting     Call MD for:  severe uncontrolled pain     Call MD for:  difficulty breathing, headache or visual disturbances     Call MD for:  redness, tenderness, or signs of infection (pain, swelling, redness, odor or green/yellow discharge around incision site)     Call MD for:  hives     Call MD for:  persistent dizziness or light-headedness     Call MD for:  extreme fatigue     Other restrictions (specify):   Order Comments: Keep the right elbow immobilized for 2 weeks     Remove dressing in 48 hours   Order Comments: Apply betadine, gauze and an ace wrap, daily       Follow-up Information     Jayro Pedraza Sr, MD.     Specialty:  Orthopedic Surgery  Why:  As needed, If symptoms worsen, For suture removal, For wound re-check  Contact information:  [address] [address] [phone]

## 2018-07-25 NOTE — INTERVAL H&P NOTE
The patient has been examined and the H&P has been reviewed:    I concur with the findings and no changes have occurred since H&P was written.    Anesthesia/Surgery risks, benefits and alternative options discussed and understood by patient/family.          Active Hospital Problems    Diagnosis  POA    Olecranon bursitis of right elbow [M70.21]  Yes      Resolved Hospital Problems    Diagnosis Date Resolved POA   No resolved problems to display.

## 2018-07-25 NOTE — ANESTHESIA POSTPROCEDURE EVALUATION
"Anesthesia Post Evaluation    Patient: Cassandra Campos    Procedure(s) Performed: Procedure(s) (LRB):  BURSECTOMY, OLECRANON-RIGHT (Right)  EXCISION, EXOSTOSIS (Right)    Final Anesthesia Type: general  Patient location during evaluation: PACU  Patient participation: Yes- Able to Participate  Level of consciousness: awake and alert  Post-procedure vital signs: reviewed and stable  Pain management: adequate  Airway patency: patent  PONV status at discharge: No PONV  Anesthetic complications: no      Cardiovascular status: blood pressure returned to baseline  Respiratory status: unassisted  Hydration status: euvolemic  Follow-up not needed.        Visit Vitals  /78   Pulse 80   Temp 36.9 °C (98.5 °F) (Oral)   Resp 13   Ht 5' 3" (1.6 m)   Wt 90.4 kg (199 lb 4.7 oz)   SpO2 (!) 94%   Breastfeeding? No   BMI 35.30 kg/m²       Pain/Francheska Score: Pain Assessment Performed: Yes (7/25/2018  2:15 PM)  Presence of Pain: complains of pain/discomfort (7/25/2018  2:15 PM)  Pain Rating Prior to Med Admin: 5 (7/25/2018  2:12 PM)  Francheska Score: 10 (7/25/2018  2:15 PM)      "

## 2018-07-25 NOTE — TRANSFER OF CARE
"Anesthesia Transfer of Care Note    Patient: Cassandra Campos    Procedure(s) Performed: Procedure(s) (LRB):  BURSECTOMY, OLECRANON-RIGHT (Right)  EXCISION, EXOSTOSIS (Right)    Patient location: PACU    Anesthesia Type: general    Transport from OR: Transported from OR on room air with adequate spontaneous ventilation    Post pain: adequate analgesia    Post assessment: no apparent anesthetic complications    Post vital signs: stable    Level of consciousness: awake    Nausea/Vomiting: no nausea/vomiting    Complications: none    Transfer of care protocol was followed      Last vitals:   Visit Vitals  /79 (BP Location: Left arm, Patient Position: Sitting)   Pulse 85   Temp 36.6 °C (97.9 °F) (Temporal)   Resp 16   Ht 5' 3" (1.6 m)   Wt 90.4 kg (199 lb 4.7 oz)   SpO2 97%   Breastfeeding? No   BMI 35.30 kg/m²     "

## 2018-07-25 NOTE — H&P
CC:  This is a 67-year-old female that complains of right elbow pain that interferes with activities of daily living.      Chief Complaint   Patient presents with    Right Elbow - Pain         HPI:  Patient states that she has failed conservative treatment of her right elbow pain. She continues to bump the right olecranon which causes significant pain and debility.  Patient states that the pain level as a 10/10.     PMH:         Past Medical History:   Diagnosis Date    Arthritis       hands    Borderline glaucoma      COPD (chronic obstructive pulmonary disease)       pt states she does not have copd    Diabetes mellitus, type 2      Gastritis      Gastritis       upper GI 2017    Hydradenitis      Hyperlipidemia      Hypertension      Insomnia      Migraines 2000    Morbid obesity due to excess calories 2016    Nasal septum perforation      Obesity      Pneumonia      Sleep apnea      SVT (supraventricular tachycardia) 2013    Type 2 diabetes mellitus      Type II or unspecified type diabetes mellitus without mention of complication, not stated as uncontrolled 2013         PSH:          Past Surgical History:   Procedure Laterality Date    AXILLARY HIDRADENITIS EXCISION        BREAST BIOPSY        BREAST LUMPECTOMY Bilateral 1998     Benign    CARPAL TUNNEL RELEASE         bilateral    CATARACT EXTRACTION Bilateral       OU     SECTION   1979    CHOLECYSTECTOMY   2014    CYST REMOVAL   2015     sebaceous cyst removed from face    gastric sleeve   2017     Dr. Watson    KNEE SURGERY Right      TONSILLECTOMY, ADENOIDECTOMY   1980s    TRIGGER FINGER RELEASE Right 2015     Dr. Pedraza         Family Hx:          Family History   Problem Relation Age of Onset    Prostate cancer Brother      Diabetes Maternal Aunt           Allergy:  Review of patient's allergies indicates:  No Known Allergies     Medication:    Current Outpatient  Prescriptions:     ACCU-CHEK FASTCLIX Misc, USE ONE TIME DAILY, Disp: 100 each, Rfl: 3    ACCU-CHEK SMARTVIEW TEST STRIP Strp, TEST ONE TIME DAILY, Disp: 100 strip, Rfl: 3    albuterol 90 mcg/actuation inhaler, Inhale 2 puffs into the lungs every 4 to 6 hours as needed for Wheezing., Disp: 1 Inhaler, Rfl: 11    amitriptyline (ELAVIL) 100 MG tablet, TAKE 1 TABLET NIGHTLY, Disp: 90 tablet, Rfl: 3    b complex vitamins tablet, Take 1 tablet by mouth every evening. , Disp: , Rfl:     blood-glucose meter kit, Use as instructed, Disp: 1 each, Rfl: 0    calcium citrate (CALCITRATE) 200 mg (950 mg) tablet, Take 1 tablet by mouth once daily. Ca 630 mg/Vit D 500IU - Pt takes 1 tab three times daily, Disp: , Rfl:     ciclopirox (PENLAC) 8 % Soln, Apply to affected toenails at night time DAILY. On 7th day, file nails down, clean all nails with alcohol and restart application process., Disp: 1 Bottle, Rfl: 10    cyanocobalamin, vitamin B-12, (VITAMIN B-12) 50 mcg tablet, Take 50 mcg by mouth once daily., Disp: , Rfl:     eszopiclone 3 mg Tab, TAKE 1 TABLET EVERY NIGHT AS NEEDED, Disp: 90 tablet, Rfl: 0    ferrous sulfate 325 (65 FE) MG EC tablet, Take 18 mg by mouth once daily. , Disp: , Rfl:     metFORMIN (GLUCOPHAGE-XR) 500 MG 24 hr tablet, Take 2 tablets (1,000 mg total) by mouth once daily., Disp: 180 tablet, Rfl: 3    metoprolol succinate (TOPROL-XL) 50 MG 24 hr tablet, TAKE 1 TABLET EVERY DAY, Disp: 90 tablet, Rfl: 1    multivitamin (ONE DAILY MULTIVITAMIN) per tablet, Take 1 tablet by mouth 2 (two) times daily. , Disp: , Rfl:     omeprazole (PRILOSEC) 20 MG capsule, TAKE 1 CAPSULE ONE TIME DAILY, Disp: 90 capsule, Rfl: 1    pravastatin (PRAVACHOL) 40 MG tablet, TAKE 1 TABLET EVERY DAY, Disp: 90 tablet, Rfl: 1     Social History:    Social History   Social History            Social History    Marital status:        Spouse name: N/A    Number of children: 1    Years of education: N/A          "  Occupational History     aid               Social History Main Topics    Smoking status: Former Smoker       Packs/day: 0.50       Years: 30.00       Types: Cigarettes       Start date: 1/1/1970       Quit date: 1/1/2012    Smokeless tobacco: Never Used    Alcohol use Yes         Comment: rarely // wine  No alcohol prior to surgery    Drug use: No    Sexual activity: Not Currently       Partners: Male           Other Topics Concern    Not on file          Social History Narrative     Single, part-time teacher. Masters degree biology.             Vitals:   /70   Pulse 84   Ht 5' 3" (1.6 m)   Wt 88 kg (194 lb 0.1 oz)   BMI 34.37 kg/m²       ROS:  GENERAL: No fever, chills, fatigability or weight loss.  SKIN: No rashes, itching or changes in color or texture of skin.  HEAD: No headaches or recent head trauma.  EYES: Visual acuity fine. No photophobia, ocular pain or diplopia.  EARS: Denies ear pain, discharge or vertigo.  NOSE: No loss of smell, no epistaxis or postnasal drip.  MOUTH & THROAT: No hoarseness or change in voice. No excessive gum bleeding.  NODES: Denies swollen glands.  CHEST: Denies BOGGS, cyanosis, wheezing, cough and sputum production.  CARDIOVASCULAR: Denies chest pain, PND, orthopnea or reduced exercise tolerance.  ABDOMEN: Appetite fine. No weight loss. Denies diarrhea, abdominal pain, hematemesis or blood in stool.  URINARY: No flank pain, dysuria or hematuria.  PERIPHERAL VASCULAR: No claudication or cyanosis.  NEUROLOGIC: No history of seizures, paralysis, alteration of gait or coordination.  MUSCULOSKELETAL: See HPI     PE:  APPEARANCE: Well nourished, well developed, in no acute distress.   HEAD: Normocephalic, atraumatic.  EYES: PERRL. EOMI.   EARS: TM's intact. Light reflex normal. No retraction or perforation.   NOSE: Mucosa pink. Airway clear.  MOUTH & THROAT: No tonsillar enlargement. No pharyngeal erythema or exudate. No stridor.  NECK: Supple.   NODES: " No cervical, axillary or inguinal lymph node enlargement.  CHEST: Lungs clear to auscultation.  CARDIOVASCULAR: Normal S1, S2. No rubs, murmurs or gallops.  ABDOMEN: Bowel sounds normal. Not distended. Soft. No tenderness or masses.  NEUROLOGIC: Cranial Nerves: II-XII grossly intact, also see MUSCULOSKELETAL  MUSCULOSKELETAL:      Right elbow-prominent olecranon osteophyte, swelling within the olecranon bursa.  Full range motion of the elbow no erythema or warmth.  Patient's skin is intact.     Assessment:  X-Ray Elbow Complete 3 view Right  Narrative: EXAMINATION:  XR ELBOW COMPLETE 3 VIEW RIGHT     CLINICAL HISTORY:  . Pain, unspecified     TECHNIQUE:  AP, lateral, and oblique views of the right elbow were performed.     COMPARISON:  Radiographs from September 8, 2016     FINDINGS:  A posterior olecranon spur is again noted.  There is mild spurring about the coronoid and lateral epicondyle regions which is stable in appearance.  No acute fracture or dislocation.  No joint effusion.  Impression: Stable exam as above     Electronically signed by:         Pasha Johnson MD  Date:                                        06/05/2018  Time:                                       13:12               Diagnosis:              1.  Right olecranon bursitis                2.  Right elbow exostosis                    Diagnostic Studies  MRI-No  X-Ray-No  EMG/NCV-No  Arthrogram-No  Bone Scan-No  CT Scan-No  Doppler-No  ESR-No  CRP-No  CBC with Diff-No   Rheumatoid/Arthritis Panel-No        Plan:                                                  1. PT-no                                                 2.OT-yes                                          3.NSAID-yes                                        4. Narcotics-no                                     5. Wound care-No                                 6. Rest-yes                                           7. Surgery-yes , exostosis excision of the right elbow with olecranon bursectomy                                         8. VAZQUEZ Hose-no                                    9. Anticoagulation therapy-no               10. Elevation-no                                     11. Crutches-no                                    12. Walker-no             13. Cane no                        14. Referral-no                                     15.Injection-no                            16. Splint   /    Cast   /   Cast Shoe-No              17. RICE-none            18. Follow up-  32 weeks

## 2018-07-25 NOTE — ANESTHESIA PREPROCEDURE EVALUATION
07/25/2018  Cassandra Campos is a 68 y.o., female.    Anesthesia Evaluation    I have reviewed the Patient Summary Reports.        Review of Systems  Anesthesia Hx:  No problems with previous Anesthesia  Denies Family Hx of Anesthesia complications.   Denies Personal Hx of Anesthesia complications.   Cardiovascular:   Hypertension Denies MI.  Denies CAD.       Pulmonary:   Pneumonia Sleep Apnea    Renal/:   Denies Chronic Renal Disease.     Hepatic/GI:   GERD    Neurological:   Denies CVA. Headaches Denies Seizures.    Endocrine:   Diabetes        Physical Exam  General:  Well nourished    Airway/Jaw/Neck:  Airway Findings: Mouth Opening: Normal General Airway Assessment: Adult  Mallampati: II       Chest/Lungs:  Chest/Lungs Findings: Clear to auscultation, Normal Respiratory Rate     Heart/Vascular:  Heart Findings: Rate: Normal  Rhythm: Regular Rhythm  Sounds: Normal             Anesthesia Plan  Type of Anesthesia, risks & benefits discussed:  Anesthesia Type:  general  Patient's Preference:   Intra-op Monitoring Plan:   Intra-op Monitoring Plan Comments:   Post Op Pain Control Plan:   Post Op Pain Control Plan Comments:   Induction:   IV  Beta Blocker:  Patient is not currently on a Beta-Blocker (No further documentation required).       Informed Consent: Patient understands risks and agrees with Anesthesia plan.  Questions answered. Anesthesia consent signed with patient.  ASA Score: 2     Day of Surgery Review of History & Physical:            Ready For Surgery From Anesthesia Perspective.

## 2018-07-25 NOTE — DISCHARGE INSTRUCTIONS
General Information:    1. Do not drink alcoholic beverages including beer for 24 hours or as long as you are on pain medication..  2. Do not drive a motor vehicle, operate machinery or power tools, or signs legal papers for 24 hours or as long as you are on pain medication.   3. You may experience light-headedness, dizziness, and sleepiness following surgery. Please do not stay alone. A responsible adult should be with you for this 24 hour period.  4. Go home and rest.  5. Progress slowly to a normal diet unless instructed.  Otherwise, begin with liquids such as soft drinks, then soup and crackers working up to solid foods. Drink plenty of nonalcoholic fluids.  6. Certain anesthetics and pain medications produce nausea and vomiting in certain individuals. If nausea becomes a problem at home, call you doctor.  7. A nurse will be calling you sometime after surgery. Do not be alarmed. This is our way of finding out how you are doing.  8. Several times every hour while you are awake, take 2-3 deep breaths and cough. If you had stomach surgery hold a pillow or rolled towel firmly against your stomach before you cough. This will help with any pain the cough might cause.  9. Several times every hour while you are awake, pump and flex your feet 5-6 times and do foot circles. This will help prevent blood clots.  10. Call your doctor for severe pain, bleeding, fever, or signs or symptoms of infection (pain, swelling, redness, foul odor, drainage).  11. You can contact your doctor anytime by [phone] for the [facility] (at [facility]) or [phone] for the [facility] on [facility].   [email or web address] is another way to contact your doctor if you are an active participant online with [facility].  12. Continue Nozin provided at discharge twice daily for 7 days or until the incision is healed.  See pamphlet or box for instructions.

## 2018-07-25 NOTE — OP NOTE
OPERATIVE DICTATION:    DATE OF SERVICE:07/25/2018      Preoperative Diagnosis:  Right elbow olecranon bursitis, exostosis of the olecranon.    Postoperative Diagnosis:   Right elbow olecranon bursitis, exostosis of the olecranon.    Procedure:  Right olecranon bursectomy, exostosis is excision of the olecranon    Indication for surgery:  Right olecranon bursitis, exostosis of the olecranon.    Anesthesia:  General anesthesia    Complications:  None    Surgeon: Jayro Pedraza M.D.     Specimen:  Right olecranon bursa    Assistant: FACUNDO Mejia. His assistance was critical and necesssary with positioning of the extremity throughout the surgical procedure.The physician assistant allowed me to access areas of the elbow that could not be possible without the assistance of a trained orthopedic physician assistant.  His assistance was critical to allowing me to provide the highest level of care to the patient.      Operative procedure:  Patient was brought to the operating room after proper consent and placed under general anesthesia with intubation.  The right upper extremity was prepped with alcohol, chlorhexidine and sterilely draped.  The time-out was performed and the correct extremity identified. The Esmarch used for exsanguination and the tourniquet inflated to 250 mm hg pressure.  A curved incision was made just ulnar to the olecranon extending distally 1/2 cm and proximally 2 cm.  Dissection carried through the subcutaneous tissue and the olecranon bursa identified. The bursa noted to be thickened and fibrotic with inflammation within the bursa.  The bursa was excised on block and noted to be approximately 2 x 2 cm.  Patient noted to have an exostosis over the posterior aspect of the proximal olecranon.  The osteotome was used to excise the exostosis.  Patient had minimal degeneration over the radial aspect the triceps tendon insertion at the proximal olecranon.  The fibrotic fibers were debrided.   Irrigation was performed.  The incision closed using a interrupted 3 O nylon suture as well as a running 3 O nylon suture. The patient was injected with 0.25% Marcaine plain and 1% xylocaine plain.  Betadine gauze and cast padding applied. The patient placed in a posterior splint at 90° of flexion. Patient tolerated procedure well left the operating room in good condition.                 Jayro Pedraza M.D.

## 2018-07-25 NOTE — ANESTHESIA RELEASE NOTE
"Anesthesia Release from PACU Note    Patient: Cassandra Campos    Procedure(s) Performed: Procedure(s) (LRB):  BURSECTOMY, OLECRANON-RIGHT (Right)  EXCISION, EXOSTOSIS (Right)    Anesthesia type: general    Post pain: Adequate analgesia    Post assessment: no apparent anesthetic complications    Last Vitals:   Visit Vitals  /79 (BP Location: Left arm, Patient Position: Sitting)   Pulse 85   Temp 36.6 °C (97.9 °F) (Temporal)   Resp 16   Ht 5' 3" (1.6 m)   Wt 90.4 kg (199 lb 4.7 oz)   SpO2 97%   Breastfeeding? No   BMI 35.30 kg/m²       Post vital signs: stable    Level of consciousness: awake    Nausea/Vomiting: no nausea/no vomiting    Complications: none    Airway Patency: patent    Respiratory: unassisted    Cardiovascular: stable and blood pressure at baseline    Hydration: euvolemic  "

## 2018-08-02 VITALS
WEIGHT: 199.31 LBS | HEIGHT: 63 IN | TEMPERATURE: 99 F | HEART RATE: 80 BPM | BODY MASS INDEX: 35.32 KG/M2 | RESPIRATION RATE: 13 BRPM | OXYGEN SATURATION: 94 % | DIASTOLIC BLOOD PRESSURE: 78 MMHG | SYSTOLIC BLOOD PRESSURE: 138 MMHG

## 2018-08-03 ENCOUNTER — PATIENT MESSAGE (OUTPATIENT)
Dept: INTERNAL MEDICINE | Facility: CLINIC | Age: 68
End: 2018-08-03

## 2018-08-03 DIAGNOSIS — G47.00 INSOMNIA, UNSPECIFIED TYPE: ICD-10-CM

## 2018-08-03 RX ORDER — ESZOPICLONE 3 MG/1
TABLET, FILM COATED ORAL
Qty: 90 TABLET | Refills: 0 | OUTPATIENT
Start: 2018-08-03

## 2018-08-03 RX ORDER — PRAVASTATIN SODIUM 40 MG/1
40 TABLET ORAL DAILY
Qty: 90 TABLET | Refills: 0 | Status: SHIPPED | OUTPATIENT
Start: 2018-08-03 | End: 2018-11-01 | Stop reason: SDUPTHER

## 2018-08-04 RX ORDER — PRAVASTATIN SODIUM 40 MG/1
TABLET ORAL
Qty: 90 TABLET | Refills: 1 | Status: SHIPPED | OUTPATIENT
Start: 2018-08-04 | End: 2018-11-01 | Stop reason: SDUPTHER

## 2018-08-06 ENCOUNTER — PATIENT MESSAGE (OUTPATIENT)
Dept: ORTHOPEDICS | Facility: CLINIC | Age: 68
End: 2018-08-06

## 2018-08-06 ENCOUNTER — TELEPHONE (OUTPATIENT)
Dept: INTERNAL MEDICINE | Facility: CLINIC | Age: 68
End: 2018-08-06

## 2018-08-10 ENCOUNTER — OFFICE VISIT (OUTPATIENT)
Dept: ORTHOPEDICS | Facility: CLINIC | Age: 68
End: 2018-08-10
Payer: MEDICARE

## 2018-08-10 VITALS
RESPIRATION RATE: 18 BRPM | BODY MASS INDEX: 35.26 KG/M2 | HEART RATE: 95 BPM | HEIGHT: 63 IN | DIASTOLIC BLOOD PRESSURE: 79 MMHG | TEMPERATURE: 98 F | WEIGHT: 199 LBS | SYSTOLIC BLOOD PRESSURE: 109 MMHG

## 2018-08-10 DIAGNOSIS — Z98.890 POSTOPERATIVE STATE: ICD-10-CM

## 2018-08-10 DIAGNOSIS — M89.8X9 EXOSTOSIS: ICD-10-CM

## 2018-08-10 DIAGNOSIS — M70.21 OLECRANON BURSITIS OF RIGHT ELBOW: Primary | ICD-10-CM

## 2018-08-10 PROCEDURE — 99999 PR PBB SHADOW E&M-EST. PATIENT-LVL IV: CPT | Mod: PBBFAC,,, | Performed by: PHYSICIAN ASSISTANT

## 2018-08-10 PROCEDURE — 99024 POSTOP FOLLOW-UP VISIT: CPT | Mod: S$GLB,,, | Performed by: PHYSICIAN ASSISTANT

## 2018-08-10 NOTE — PROGRESS NOTES
Patient ID: Cassandra aCmpos is a 68 y.o. female.    Chief Complaint: Pain of the Right Elbow      HPI: Cassandra Campos  is a 68 y.o. female who c/o Pain of the Right Elbow   for duration of nearly 2 and half weeks.  She had a right elbow olecranon bursectomy by Dr. Pedraza.  She has a family emergency at home today, so I am seeing her for him.  Pain level today at worst is 7/10 in severity.  It is improving steadily.  She is off pain medications. She denies erythema, purulent drainage, as well as fevers.  She is happy with her progress.  She was not giving any antibiotics at time of discharge per her report.    Past Medical History:   Diagnosis Date    Arthritis     hands    Borderline glaucoma     Gastritis     upper GI 2017    Hydradenitis     Hyperlipidemia     Hypertension     Insomnia     Migraines 2000    Morbid obesity due to excess calories 2016    Nasal septum perforation     Obesity     Pneumonia     Restrictive airway disease     Sleep apnea     SVT (supraventricular tachycardia) 2013    Type 2 diabetes mellitus     Type II or unspecified type diabetes mellitus without mention of complication, not stated as uncontrolled 2013     Past Surgical History:   Procedure Laterality Date    AXILLARY HIDRADENITIS EXCISION      BONE EXOSTOSIS EXCISION Right 2018    Procedure: EXCISION, EXOSTOSIS;  Surgeon: Jayro Pedraza Sr., MD;  Location: [facility] OR;  Service: Orthopedics;  Laterality: Right;    BREAST BIOPSY      BREAST LUMPECTOMY Bilateral 1998    Benign    CARPAL TUNNEL RELEASE      bilateral    CATARACT EXTRACTION Bilateral     OU     SECTION  1979    CHOLECYSTECTOMY  2014    CYST REMOVAL  2015    sebaceous cyst removed from face    gastric sleeve  2017    Dr. Watson    KNEE SURGERY Right     OLECRANON BURSECTOMY Right 2018    Procedure: BURSECTOMY, OLECRANON;  Surgeon: Jayro Pedraza Sr., MD;  Location: [facility] OR;  Service:  Orthopedics;  Laterality: Right;    TONSILLECTOMY, ADENOIDECTOMY  1980s    TRIGGER FINGER RELEASE Right april 1, 2015    Dr. Pedraza     Family History   Problem Relation Age of Onset    Prostate cancer Brother     Diabetes Maternal Aunt      Social History     Social History    Marital status:      Spouse name: N/A    Number of children: 1    Years of education: N/A     Occupational History     aid      Social History Main Topics    Smoking status: Former Smoker     Packs/day: 0.50     Years: 30.00     Types: Cigarettes     Start date: 1/1/1970     Quit date: 1/1/2012    Smokeless tobacco: Never Used    Alcohol use Yes      Comment: rarely // wine  No alcohol prior to surgery    Drug use: No    Sexual activity: Not Currently     Partners: Male     Other Topics Concern    Not on file     Social History Narrative    Single, part-time teacher. Masters degree biology.      Medication List with Changes/Refills   Current Medications    ACCU-CHEK FASTCLIX MISC    USE ONE TIME DAILY    ACCU-CHEK SMARTVIEW TEST STRIP STRP    TEST ONE TIME DAILY    ALBUTEROL 90 MCG/ACTUATION INHALER    Inhale 2 puffs into the lungs every 4 to 6 hours as needed for Wheezing.    AMITRIPTYLINE (ELAVIL) 100 MG TABLET    TAKE 1 TABLET NIGHTLY    BLOOD-GLUCOSE METER KIT    Use as instructed    CICLOPIROX (PENLAC) 8 % SOLN    Apply to affected toenails at night time DAILY. On 7th day, file nails down, clean all nails with alcohol and restart application process.    ESZOPICLONE 3 MG TAB    TAKE 1 TABLET EVERY NIGHT AS NEEDED    METFORMIN (GLUCOPHAGE-XR) 500 MG 24 HR TABLET    Take 2 tablets (1,000 mg total) by mouth once daily.    METOPROLOL SUCCINATE (TOPROL-XL) 50 MG 24 HR TABLET    TAKE 1 TABLET EVERY DAY    MULTIVITAMIN (ONE DAILY MULTIVITAMIN) PER TABLET    Take 1 tablet by mouth nightly.     OMEPRAZOLE (PRILOSEC) 20 MG CAPSULE    TAKE 1 CAPSULE EVERY DAY    PRAVASTATIN (PRAVACHOL) 40 MG TABLET    Take 1  tablet (40 mg total) by mouth once daily.    PRAVASTATIN (PRAVACHOL) 40 MG TABLET    TAKE 1 TABLET EVERY DAY     Review of patient's allergies indicates:  No Known Allergies        Objective:                Right Elbow Exam     Comments:  Full ROM right elbow  No erythema, no purulence, no drainage, no s/sx infxn  No TTP over olecranon  Minimal swelling  2+ radial pulse  Sensation intact                      Assessment:       Encounter Diagnoses   Name Primary?    Olecranon bursitis of right elbow Yes    Exostosis     Postoperative state           Plan:       Cassandra was seen today for pain.    Diagnoses and all orders for this visit:    Olecranon bursitis of right elbow    Exostosis    Postoperative state    Ms. Trujillo underwent a right elbow olecranon bursectomy nearly 2 weeks ago by Dr. Pedraza.  I am seeing her today, she has a family emergency needs to leave the clinic as soon as possible.  She is doing quite well.  She is off pain medications. Her incision is clean dry and intact. We have clean the incision remove sutures. Suture strips were applied across the incision.  She can get it wet with clean running water an antibacterial soap.  I have advised her against soaking it in standing water.  She will follow up with Dr. Pedraza in the office in 1 month.  If she has any problems before then, she will notify the office.  Specifically, she develops erythema, worsening pain, purulent drainage, or fevers, she needs to be seen.  She verbalizes understanding and agrees with the above plan.  Follow-up in about 1 month (around 9/10/2018) for f/u with Dr. Pedraza.          The patient understands, chooses and consents to this plan and accepts all   the risks which include but are not limited to the risks mentioned above.     Disclaimer: This note was prepared using a voice recognition system and is likely to have sound alike errors within the text.

## 2018-08-14 ENCOUNTER — CLINICAL SUPPORT (OUTPATIENT)
Dept: INTERNAL MEDICINE | Facility: CLINIC | Age: 68
End: 2018-08-14
Payer: MEDICARE

## 2018-08-14 DIAGNOSIS — E11.9 CONTROLLED TYPE 2 DIABETES MELLITUS WITHOUT COMPLICATION, WITHOUT LONG-TERM CURRENT USE OF INSULIN: ICD-10-CM

## 2018-08-14 DIAGNOSIS — E78.5 HYPERLIPIDEMIA ASSOCIATED WITH TYPE 2 DIABETES MELLITUS: ICD-10-CM

## 2018-08-14 DIAGNOSIS — E11.69 HYPERLIPIDEMIA ASSOCIATED WITH TYPE 2 DIABETES MELLITUS: ICD-10-CM

## 2018-08-14 DIAGNOSIS — K90.9 IMPAIRED INTESTINAL ABSORPTION: ICD-10-CM

## 2018-08-14 DIAGNOSIS — R74.8 ABNORMAL LIVER ENZYMES: ICD-10-CM

## 2018-08-14 LAB
ALBUMIN SERPL BCP-MCNC: 3.6 G/DL
ALBUMIN/CREAT UR: 9.2 UG/MG
ALP SERPL-CCNC: 97 U/L
ALT SERPL W/O P-5'-P-CCNC: 30 U/L
ANION GAP SERPL CALC-SCNC: 8 MMOL/L
AST SERPL-CCNC: 26 U/L
BASOPHILS # BLD AUTO: 0.06 K/UL
BASOPHILS NFR BLD: 0.9 %
BILIRUB SERPL-MCNC: 0.4 MG/DL
BUN SERPL-MCNC: 13 MG/DL
CALCIUM SERPL-MCNC: 9.9 MG/DL
CHLORIDE SERPL-SCNC: 106 MMOL/L
CHOLEST SERPL-MCNC: 132 MG/DL
CHOLEST/HDLC SERPL: 3.8 {RATIO}
CO2 SERPL-SCNC: 26 MMOL/L
CREAT SERPL-MCNC: 0.7 MG/DL
CREAT UR-MCNC: 87 MG/DL
DIFFERENTIAL METHOD: ABNORMAL
EOSINOPHIL # BLD AUTO: 0.2 K/UL
EOSINOPHIL NFR BLD: 2.9 %
ERYTHROCYTE [DISTWIDTH] IN BLOOD BY AUTOMATED COUNT: 15.1 %
EST. GFR  (AFRICAN AMERICAN): >60 ML/MIN/1.73 M^2
EST. GFR  (NON AFRICAN AMERICAN): >60 ML/MIN/1.73 M^2
ESTIMATED AVG GLUCOSE: 117 MG/DL
GLUCOSE SERPL-MCNC: 95 MG/DL
HBA1C MFR BLD HPLC: 5.7 %
HCT VFR BLD AUTO: 38.6 %
HDLC SERPL-MCNC: 35 MG/DL
HDLC SERPL: 26.5 %
HGB BLD-MCNC: 12.7 G/DL
IMM GRANULOCYTES # BLD AUTO: 0.01 K/UL
IMM GRANULOCYTES NFR BLD AUTO: 0.1 %
LDLC SERPL CALC-MCNC: 61 MG/DL
LYMPHOCYTES # BLD AUTO: 3.4 K/UL
LYMPHOCYTES NFR BLD: 50.3 %
MCH RBC QN AUTO: 25.3 PG
MCHC RBC AUTO-ENTMCNC: 32.9 G/DL
MCV RBC AUTO: 77 FL
MICROALBUMIN UR DL<=1MG/L-MCNC: 8 UG/ML
MONOCYTES # BLD AUTO: 0.6 K/UL
MONOCYTES NFR BLD: 9.2 %
NEUTROPHILS # BLD AUTO: 2.5 K/UL
NEUTROPHILS NFR BLD: 36.6 %
NONHDLC SERPL-MCNC: 97 MG/DL
NRBC BLD-RTO: 0 /100 WBC
PLATELET # BLD AUTO: 320 K/UL
PMV BLD AUTO: 10.6 FL
POTASSIUM SERPL-SCNC: 4.1 MMOL/L
PROT SERPL-MCNC: 7.8 G/DL
RBC # BLD AUTO: 5.02 M/UL
SODIUM SERPL-SCNC: 140 MMOL/L
TRIGL SERPL-MCNC: 180 MG/DL
VIT B12 SERPL-MCNC: 1440 PG/ML
WBC # BLD AUTO: 6.84 K/UL

## 2018-08-14 PROCEDURE — 82607 VITAMIN B-12: CPT

## 2018-08-14 PROCEDURE — 80053 COMPREHEN METABOLIC PANEL: CPT

## 2018-08-14 PROCEDURE — 85025 COMPLETE CBC W/AUTO DIFF WBC: CPT

## 2018-08-14 PROCEDURE — 80061 LIPID PANEL: CPT

## 2018-08-14 PROCEDURE — 83036 HEMOGLOBIN GLYCOSYLATED A1C: CPT

## 2018-08-14 PROCEDURE — 82043 UR ALBUMIN QUANTITATIVE: CPT

## 2018-08-16 NOTE — PROGRESS NOTES
Patient labs drawn. Patient verbally understood the information given and will be notified when her results are in.

## 2018-08-20 ENCOUNTER — TELEPHONE (OUTPATIENT)
Dept: ORTHOPEDICS | Facility: CLINIC | Age: 68
End: 2018-08-20

## 2018-08-20 RX ORDER — HYDROCODONE BITARTRATE AND ACETAMINOPHEN 5; 325 MG/1; MG/1
1 TABLET ORAL EVERY 8 HOURS PRN
Qty: 30 TABLET | Refills: 0 | Status: SHIPPED | OUTPATIENT
Start: 2018-08-20 | End: 2019-01-17

## 2018-08-20 NOTE — TELEPHONE ENCOUNTER
Called patient to tell her that her medication was sent  to her pharmacy. Patient verbalized understanding..        ----- Message from Star Pryor PA-C sent at 8/20/2018  3:05 PM CDT -----  RX sent

## 2018-08-23 ENCOUNTER — PATIENT OUTREACH (OUTPATIENT)
Dept: ADMINISTRATIVE | Facility: HOSPITAL | Age: 68
End: 2018-08-23

## 2018-08-23 NOTE — LETTER
2018      We are seeing Cassandra Campos,  1950, at [facility]. Denia Maldonado MD is their primary care physician. To help with our [place] maintenance records could you please send the following:     RECENT DM EYE EXAM    Please fax to [facility] [phone].     Thank-you in advance for your assistance. If you have any questions or concerns please feel free to call.     Shaila KAPLAN LPN Care Coordinator  Care Coordination Department  [facility]  [phone]

## 2018-08-24 ENCOUNTER — TELEPHONE (OUTPATIENT)
Dept: ORTHOPEDICS | Facility: CLINIC | Age: 68
End: 2018-08-24

## 2018-08-24 NOTE — TELEPHONE ENCOUNTER
Called the patient to reschedule her appointment that she has on 9/11/18 with Dr. Pedraza. Due to the fact that Dr. Pedraza is out of the office on that day. Left a message for her to give the office a call back to reschedule.FP

## 2018-08-28 ENCOUNTER — PATIENT MESSAGE (OUTPATIENT)
Dept: ORTHOPEDICS | Facility: CLINIC | Age: 68
End: 2018-08-28

## 2018-09-04 DIAGNOSIS — G47.00 INSOMNIA, UNSPECIFIED TYPE: ICD-10-CM

## 2018-09-04 RX ORDER — ESZOPICLONE 3 MG/1
TABLET, FILM COATED ORAL
Qty: 90 TABLET | Refills: 0 | OUTPATIENT
Start: 2018-09-04

## 2018-09-05 RX ORDER — ESZOPICLONE 3 MG/1
TABLET, FILM COATED ORAL
Qty: 90 TABLET | Refills: 1 | Status: SHIPPED | OUTPATIENT
Start: 2018-09-05 | End: 2019-02-13 | Stop reason: SDUPTHER

## 2018-09-05 NOTE — TELEPHONE ENCOUNTER
Pt states that since it takes so long to get it from pharmacy she wanted to go ahead and get it sent to pharmacy. States she waited until she was almost left and ran out before she got her meds in. She has only 10 left now. Please advise.

## 2018-09-06 ENCOUNTER — OFFICE VISIT (OUTPATIENT)
Dept: INTERNAL MEDICINE | Facility: CLINIC | Age: 68
End: 2018-09-06
Payer: MEDICARE

## 2018-09-06 VITALS
DIASTOLIC BLOOD PRESSURE: 78 MMHG | TEMPERATURE: 98 F | OXYGEN SATURATION: 94 % | HEART RATE: 97 BPM | HEIGHT: 63 IN | BODY MASS INDEX: 34.22 KG/M2 | SYSTOLIC BLOOD PRESSURE: 116 MMHG | WEIGHT: 193.13 LBS

## 2018-09-06 DIAGNOSIS — Z23 IMMUNIZATION DUE: ICD-10-CM

## 2018-09-06 DIAGNOSIS — J34.89 NASAL SEPTAL PERFORATION: ICD-10-CM

## 2018-09-06 DIAGNOSIS — R74.8 ABNORMAL LIVER ENZYMES: ICD-10-CM

## 2018-09-06 DIAGNOSIS — E11.9 CONTROLLED TYPE 2 DIABETES MELLITUS WITHOUT COMPLICATION, WITHOUT LONG-TERM CURRENT USE OF INSULIN: ICD-10-CM

## 2018-09-06 DIAGNOSIS — R04.0 ANTERIOR EPISTAXIS: Primary | ICD-10-CM

## 2018-09-06 PROCEDURE — 1101F PT FALLS ASSESS-DOCD LE1/YR: CPT | Mod: CPTII,,, | Performed by: FAMILY MEDICINE

## 2018-09-06 PROCEDURE — 99213 OFFICE O/P EST LOW 20 MIN: CPT | Mod: PBBFAC,PO | Performed by: FAMILY MEDICINE

## 2018-09-06 PROCEDURE — 99999 PR PBB SHADOW E&M-EST. PATIENT-LVL III: CPT | Mod: PBBFAC,,, | Performed by: FAMILY MEDICINE

## 2018-09-06 PROCEDURE — 3078F DIAST BP <80 MM HG: CPT | Mod: CPTII,,, | Performed by: FAMILY MEDICINE

## 2018-09-06 PROCEDURE — 99214 OFFICE O/P EST MOD 30 MIN: CPT | Mod: S$PBB,,, | Performed by: FAMILY MEDICINE

## 2018-09-06 PROCEDURE — 3044F HG A1C LEVEL LT 7.0%: CPT | Mod: CPTII,,, | Performed by: FAMILY MEDICINE

## 2018-09-06 PROCEDURE — 3074F SYST BP LT 130 MM HG: CPT | Mod: CPTII,,, | Performed by: FAMILY MEDICINE

## 2018-09-06 RX ORDER — CODEINE PHOSPHATE AND GUAIFENESIN 10; 100 MG/5ML; MG/5ML
SOLUTION ORAL
Refills: 0 | COMMUNITY
Start: 2018-09-02 | End: 2019-01-17

## 2018-09-06 NOTE — PROGRESS NOTES
Subjective:       Patient ID: Cassandra Campos is a 68 y.o. female.    Chief Complaint: Follow-up (lab results)    She went to urgent care at [facility] 9/2/18 due to coughing for 3 days. Got IM steroids, ZPack, tussiOrganidin.  She started with nose bleed later that day - she was laughing and leaned over and had bleeding. Yesterday leaned over and bled. All from right - she has nasal septal perforation.  Again today it occurred with leaning over - later today she blew her nose and it restarted.    Lab Results       Component                Value               Date                       WBC                      6.84                08/14/2018                 HGB                      12.7                08/14/2018                 HCT                      38.6                08/14/2018                 PLT                      320                 08/14/2018                 CHOL                     132                 08/14/2018                 TRIG                     180 (H)             08/14/2018                 HDL                      35 (L)              08/14/2018                 LDLCALC                  61.0 (L)            08/14/2018                 ALT                      30                  08/14/2018                 AST                      26                  08/14/2018                 NA                       140                 08/14/2018                 K                        4.1                 08/14/2018                 CL                       106                 08/14/2018                 CALCIUM                  9.9                 08/14/2018                 CREATININE               0.7                 08/14/2018                 BUN                      13                  08/14/2018                 CO2                      26                  08/14/2018                 TSH                      2.386               11/28/2016                 GLU                      95                   08/14/2018                 ESTGFRAFRICA             >60.0               08/14/2018                 EGFRNONAA                >60.0               08/14/2018                 HGBA1C                   5.7 (H)             08/14/2018                 MICALBCREAT              9.2                 08/14/2018            Reviewed labs from last month.  The A1c is up from normal range 6 months ago but still shows excellent control on diet and 1000 mg metformin daily.  No microalbuminuria present.  Excellent lipid levels on pravastatin 40 mg daily.  The blood pressure is controlled.  She states the metoprolol actually is for palpitations not for high blood pressure.      Review of Systems   Constitutional: Positive for unexpected weight change. Negative for activity change.   HENT: Positive for hearing loss, nosebleeds, rhinorrhea and trouble swallowing.    Eyes: Negative for discharge.   Respiratory: Positive for wheezing.    Cardiovascular: Positive for palpitations. Negative for chest pain.   Gastrointestinal: Negative for blood in stool, constipation, diarrhea and vomiting.   Endocrine: Negative for polydipsia and polyuria.   Genitourinary: Negative for difficulty urinating, dysuria, hematuria and menstrual problem.   Musculoskeletal: Negative for arthralgias, joint swelling and neck pain.   Neurological: Positive for headaches. Negative for weakness.   Psychiatric/Behavioral: Negative for confusion and dysphoric mood.       Objective:      Physical Exam   Constitutional: She is oriented to person, place, and time. She appears well-developed and well-nourished.   HENT:   Head: Normocephalic and atraumatic.   Right Ear: Tympanic membrane, external ear and ear canal normal.   Left Ear: Tympanic membrane, external ear and ear canal normal.   Mouth/Throat: No oropharyngeal exudate.   Nasal septal perforation present.  There is fresh blood clotted along the inferior and anterior borders of the perforation.  No active bleeding.    Eyes: Conjunctivae and EOM are normal.   Neck: Neck supple. No thyromegaly present.   Cardiovascular: Normal rate, regular rhythm and normal heart sounds.   Pulmonary/Chest: Effort normal and breath sounds normal.   Lymphadenopathy:     She has no cervical adenopathy.   Neurological: She is alert and oriented to person, place, and time.   Skin: Skin is warm and dry.   Psychiatric: She has a normal mood and affect. Her behavior is normal.         Assessment/Plan:     1. Anterior epistaxis     2. Nasal septal perforation     3. Controlled type 2 diabetes mellitus without complication, without long-term current use of insulin  Comprehensive metabolic panel    Hemoglobin A1c   4. Abnormal liver enzymes     5. Immunization due  Pneumococcal Polysaccharide Vaccine (23 Valent) (SQ/IM)   will wait a week before giving her the PNV 23 which is now due, 2/2 recent IM steroid tx.  She is going to use nasal saline gel and spray, blot don't blow. If still problems in a week with gently nasal precautions, will have her see ENT.  Controlled DM2/lipids/HTN. Recheck with labs in 6 months.

## 2018-09-07 DIAGNOSIS — G47.00 INSOMNIA, UNSPECIFIED TYPE: ICD-10-CM

## 2018-09-07 RX ORDER — ESZOPICLONE 3 MG/1
TABLET, FILM COATED ORAL
Qty: 90 TABLET | Refills: 1 | OUTPATIENT
Start: 2018-09-07

## 2018-09-17 ENCOUNTER — PATIENT MESSAGE (OUTPATIENT)
Dept: INTERNAL MEDICINE | Facility: CLINIC | Age: 68
End: 2018-09-17

## 2018-09-18 NOTE — TELEPHONE ENCOUNTER
Patient sent a My Chart message in regards to scheduling her FLU and Pneumonia injection. Pt is scheduled to come in on Monday 24, 2018 for 4pm. Pt verbally understood the information given.

## 2018-09-24 ENCOUNTER — CLINICAL SUPPORT (OUTPATIENT)
Dept: INTERNAL MEDICINE | Facility: CLINIC | Age: 68
End: 2018-09-24
Payer: MEDICARE

## 2018-09-24 DIAGNOSIS — Z23 IMMUNIZATION DUE: ICD-10-CM

## 2018-09-24 PROCEDURE — 99212 OFFICE O/P EST SF 10 MIN: CPT | Mod: PBBFAC,PO

## 2018-09-24 PROCEDURE — 90662 IIV NO PRSV INCREASED AG IM: CPT | Mod: PBBFAC,PO

## 2018-09-24 PROCEDURE — 99999 PR PBB SHADOW E&M-EST. PATIENT-LVL II: CPT | Mod: PBBFAC,,,

## 2018-09-24 PROCEDURE — 90732 PPSV23 VACC 2 YRS+ SUBQ/IM: CPT | Mod: PBBFAC,PO

## 2018-10-09 RX ORDER — AMITRIPTYLINE HYDROCHLORIDE 100 MG/1
TABLET ORAL
Qty: 90 TABLET | Refills: 3 | OUTPATIENT
Start: 2018-10-09

## 2018-10-11 DIAGNOSIS — E11.9 DM II (DIABETES MELLITUS, TYPE II), CONTROLLED: ICD-10-CM

## 2018-10-11 RX ORDER — BLOOD SUGAR DIAGNOSTIC
STRIP MISCELLANEOUS
Qty: 100 STRIP | Refills: 3 | OUTPATIENT
Start: 2018-10-11

## 2018-10-11 RX ORDER — AMITRIPTYLINE HYDROCHLORIDE 100 MG/1
TABLET ORAL
Qty: 90 TABLET | Refills: 3 | OUTPATIENT
Start: 2018-10-11

## 2018-10-22 ENCOUNTER — PATIENT MESSAGE (OUTPATIENT)
Dept: INTERNAL MEDICINE | Facility: CLINIC | Age: 68
End: 2018-10-22

## 2018-10-22 RX ORDER — AMITRIPTYLINE HYDROCHLORIDE 100 MG/1
TABLET ORAL
Qty: 90 TABLET | Refills: 3 | OUTPATIENT
Start: 2018-10-22

## 2018-10-23 ENCOUNTER — PATIENT MESSAGE (OUTPATIENT)
Dept: INTERNAL MEDICINE | Facility: CLINIC | Age: 68
End: 2018-10-23

## 2018-10-23 RX ORDER — AMITRIPTYLINE HYDROCHLORIDE 100 MG/1
100 TABLET ORAL NIGHTLY
Qty: 90 TABLET | Refills: 3 | Status: SHIPPED | OUTPATIENT
Start: 2018-10-23 | End: 2019-09-06 | Stop reason: SDUPTHER

## 2018-10-23 RX ORDER — AMITRIPTYLINE HYDROCHLORIDE 100 MG/1
TABLET ORAL
Qty: 90 TABLET | Refills: 0 | OUTPATIENT
Start: 2018-10-23

## 2018-10-23 NOTE — TELEPHONE ENCOUNTER
[facility] is refusing to fill her 4th refill - they think I only ordered 9 months, not 12 months in January - pt sts pls send to local pharmacy.

## 2018-10-24 ENCOUNTER — TELEPHONE (OUTPATIENT)
Dept: INTERNAL MEDICINE | Facility: CLINIC | Age: 68
End: 2018-10-24

## 2018-10-24 DIAGNOSIS — Z12.31 ENCOUNTER FOR SCREENING MAMMOGRAM FOR BREAST CANCER: Primary | ICD-10-CM

## 2018-10-24 NOTE — TELEPHONE ENCOUNTER
Returned the patient phone call. She called in regards to getting an order placed for her mammogram. Patient last seen on 9/6/18. Patient was made aware of Dr. Maldonado being out of the office on this afternoon and verbally understood the information given.     Please Advise

## 2018-10-24 NOTE — TELEPHONE ENCOUNTER
----- Message from Sudha Abernathyite sent at 10/24/2018  1:47 PM CDT -----  Contact: Pt   Pt called and stated she needs an order to schedule a mammo. She can be reached at [phone].    Thanks,  TF

## 2018-10-25 NOTE — TELEPHONE ENCOUNTER
Will call [facility] pharmacy for member [phone] - RX 1/9/28 was for #90 x 4.    [phone] - open 7A-7P CST

## 2018-10-25 NOTE — TELEPHONE ENCOUNTER
Spoke to April with [facility] and was advised that a new prescription was needed for amitriptyline.  Advised April that prescription was sent to [facility] on 10/23/2018.  April stated ok.    Called [facility] to verify if prescription for amitriptyline was picked up by patient.  Was informed by pharmacist that prescription was picked up.

## 2018-10-25 NOTE — TELEPHONE ENCOUNTER
----- Message from Nancy Mendenhall sent at 10/25/2018 10:58 AM CDT -----  Contact: [facility]  She's calling to get a Rx for Amitriptyline 100 mg, please advise [phone]

## 2018-10-26 NOTE — TELEPHONE ENCOUNTER
Called [facility] pharmacy yesterday, 10/25/2018.  They gave me the dates that she requested and the dates she was sent her four 90 day refills.  She has gotten #360 tablets since early January.    I informed the patient that she should have approximately 88 tablets left according to my calculations.  She did get some tablets locally.  She states she will look and see if she can find tablets in bottles that she might have.

## 2018-11-01 RX ORDER — PRAVASTATIN SODIUM 40 MG/1
TABLET ORAL
Qty: 90 TABLET | Refills: 2 | Status: SHIPPED | OUTPATIENT
Start: 2018-11-01 | End: 2019-08-01 | Stop reason: SDUPTHER

## 2018-12-06 ENCOUNTER — PATIENT MESSAGE (OUTPATIENT)
Dept: INTERNAL MEDICINE | Facility: CLINIC | Age: 68
End: 2018-12-06

## 2018-12-07 ENCOUNTER — TELEPHONE (OUTPATIENT)
Dept: ORTHOPEDICS | Facility: CLINIC | Age: 68
End: 2018-12-07

## 2018-12-07 NOTE — TELEPHONE ENCOUNTER
I spoke with the patient.  She will make an appointment with Carlita Macdonald whom she has seen previously.

## 2018-12-07 NOTE — TELEPHONE ENCOUNTER
Discussed Dr. Pedraza no longer at [facility]. No further information available regarding that. Discussed she could see TABITHA CUELLAR as previously and if needed she could be seen by another surgeon that's available here. She verbalized understanding.

## 2018-12-07 NOTE — TELEPHONE ENCOUNTER
----- Message from Lubna Ross sent at 12/7/2018  2:17 PM CST -----  Contact: pt  She's calling in regards to speak with nurse pls call pt back at [phone]

## 2018-12-13 DIAGNOSIS — I15.9 SECONDARY HYPERTENSION: ICD-10-CM

## 2018-12-13 RX ORDER — OMEPRAZOLE 20 MG/1
CAPSULE, DELAYED RELEASE ORAL
Qty: 90 CAPSULE | Refills: 1 | Status: SHIPPED | OUTPATIENT
Start: 2018-12-13 | End: 2019-05-08 | Stop reason: SDUPTHER

## 2018-12-16 RX ORDER — METOPROLOL SUCCINATE 50 MG/1
TABLET, EXTENDED RELEASE ORAL
Qty: 90 TABLET | Refills: 1 | Status: SHIPPED | OUTPATIENT
Start: 2018-12-16 | End: 2019-05-08 | Stop reason: SDUPTHER

## 2018-12-20 ENCOUNTER — OFFICE VISIT (OUTPATIENT)
Dept: ORTHOPEDICS | Facility: CLINIC | Age: 68
End: 2018-12-20
Payer: MEDICARE

## 2018-12-20 ENCOUNTER — OFFICE VISIT (OUTPATIENT)
Dept: OPHTHALMOLOGY | Facility: CLINIC | Age: 68
End: 2018-12-20
Payer: MEDICARE

## 2018-12-20 ENCOUNTER — HOSPITAL ENCOUNTER (OUTPATIENT)
Dept: RADIOLOGY | Facility: HOSPITAL | Age: 68
Discharge: HOME | End: 2018-12-20
Attending: PHYSICIAN ASSISTANT
Payer: MEDICARE

## 2018-12-20 ENCOUNTER — HOSPITAL ENCOUNTER (OUTPATIENT)
Dept: RADIOLOGY | Facility: HOSPITAL | Age: 68
Discharge: HOME | End: 2018-12-20
Attending: FAMILY MEDICINE
Payer: MEDICARE

## 2018-12-20 VITALS
SYSTOLIC BLOOD PRESSURE: 140 MMHG | HEIGHT: 63 IN | DIASTOLIC BLOOD PRESSURE: 90 MMHG | BODY MASS INDEX: 34.2 KG/M2 | HEART RATE: 109 BPM | WEIGHT: 193 LBS

## 2018-12-20 VITALS — WEIGHT: 193 LBS | BODY MASS INDEX: 34.2 KG/M2 | HEIGHT: 63 IN

## 2018-12-20 DIAGNOSIS — Z96.1 STATUS POST CATARACT EXTRACTION AND INSERTION OF INTRAOCULAR LENS, RIGHT: ICD-10-CM

## 2018-12-20 DIAGNOSIS — M65.4 DE QUERVAIN'S TENOSYNOVITIS, RIGHT: Primary | ICD-10-CM

## 2018-12-20 DIAGNOSIS — E11.9 TYPE 2 DIABETES MELLITUS WITHOUT RETINOPATHY: Primary | ICD-10-CM

## 2018-12-20 DIAGNOSIS — Z96.1 STATUS POST CATARACT EXTRACTION AND INSERTION OF INTRAOCULAR LENS, LEFT: ICD-10-CM

## 2018-12-20 DIAGNOSIS — M25.531 RIGHT WRIST PAIN: Primary | ICD-10-CM

## 2018-12-20 DIAGNOSIS — Z98.42 STATUS POST CATARACT EXTRACTION AND INSERTION OF INTRAOCULAR LENS, LEFT: ICD-10-CM

## 2018-12-20 DIAGNOSIS — Z12.31 ENCOUNTER FOR SCREENING MAMMOGRAM FOR BREAST CANCER: ICD-10-CM

## 2018-12-20 DIAGNOSIS — I10 ESSENTIAL HYPERTENSION: ICD-10-CM

## 2018-12-20 DIAGNOSIS — Z98.41 STATUS POST CATARACT EXTRACTION AND INSERTION OF INTRAOCULAR LENS, RIGHT: ICD-10-CM

## 2018-12-20 DIAGNOSIS — M25.531 RIGHT WRIST PAIN: ICD-10-CM

## 2018-12-20 DIAGNOSIS — Z13.5 GLAUCOMA SCREENING: ICD-10-CM

## 2018-12-20 DIAGNOSIS — H52.203 ASTIGMATISM WITH PRESBYOPIA, BILATERAL: ICD-10-CM

## 2018-12-20 DIAGNOSIS — H52.4 ASTIGMATISM WITH PRESBYOPIA, BILATERAL: ICD-10-CM

## 2018-12-20 PROCEDURE — 92015 DETERMINE REFRACTIVE STATE: CPT | Mod: HCNC,S$GLB,, | Performed by: OPTOMETRIST

## 2018-12-20 PROCEDURE — 77063 BREAST TOMOSYNTHESIS BI: CPT | Mod: TC,HCNC,PO

## 2018-12-20 PROCEDURE — 99999 PR PBB SHADOW E&M-EST. PATIENT-LVL IV: CPT | Mod: PBBFAC,HCNC,, | Performed by: PHYSICIAN ASSISTANT

## 2018-12-20 PROCEDURE — 1101F PT FALLS ASSESS-DOCD LE1/YR: CPT | Mod: CPTII,HCNC,S$GLB, | Performed by: PHYSICIAN ASSISTANT

## 2018-12-20 PROCEDURE — 92014 COMPRE OPH EXAM EST PT 1/>: CPT | Mod: HCNC,S$GLB,, | Performed by: OPTOMETRIST

## 2018-12-20 PROCEDURE — 3080F DIAST BP >= 90 MM HG: CPT | Mod: CPTII,HCNC,S$GLB, | Performed by: PHYSICIAN ASSISTANT

## 2018-12-20 PROCEDURE — 77067 SCR MAMMO BI INCL CAD: CPT | Mod: 26,HCNC,, | Performed by: RADIOLOGY

## 2018-12-20 PROCEDURE — 3077F SYST BP >= 140 MM HG: CPT | Mod: CPTII,HCNC,S$GLB, | Performed by: PHYSICIAN ASSISTANT

## 2018-12-20 PROCEDURE — 99999 PR PBB SHADOW E&M-EST. PATIENT-LVL II: CPT | Mod: PBBFAC,HCNC,, | Performed by: OPTOMETRIST

## 2018-12-20 PROCEDURE — 73110 X-RAY EXAM OF WRIST: CPT | Mod: 26,HCNC,RT, | Performed by: RADIOLOGY

## 2018-12-20 PROCEDURE — 20550 NJX 1 TENDON SHEATH/LIGAMENT: CPT | Mod: HCNC,RT,S$GLB, | Performed by: PHYSICIAN ASSISTANT

## 2018-12-20 PROCEDURE — 73110 X-RAY EXAM OF WRIST: CPT | Mod: TC,FY,HCNC,PO,RT

## 2018-12-20 PROCEDURE — 77063 BREAST TOMOSYNTHESIS BI: CPT | Mod: 26,HCNC,, | Performed by: RADIOLOGY

## 2018-12-20 PROCEDURE — 99214 OFFICE O/P EST MOD 30 MIN: CPT | Mod: 25,HCNC,S$GLB, | Performed by: PHYSICIAN ASSISTANT

## 2018-12-20 RX ORDER — METHYLPREDNISOLONE ACETATE 40 MG/ML
40 INJECTION, SUSPENSION INTRA-ARTICULAR; INTRALESIONAL; INTRAMUSCULAR; SOFT TISSUE ONCE
Status: COMPLETED | OUTPATIENT
Start: 2018-12-20 | End: 2018-12-20

## 2018-12-20 RX ORDER — MELOXICAM 7.5 MG/1
7.5 TABLET ORAL DAILY
Qty: 30 TABLET | Refills: 1 | Status: SHIPPED | OUTPATIENT
Start: 2018-12-20 | End: 2019-05-08

## 2018-12-20 RX ADMIN — METHYLPREDNISOLONE ACETATE 40 MG: 40 INJECTION, SUSPENSION INTRA-ARTICULAR; INTRALESIONAL; INTRAMUSCULAR; SOFT TISSUE at 03:12

## 2018-12-20 NOTE — PROGRESS NOTES
HPI     Diabetic Eye Exam      Additional comments: Yearly              Comments     Last seen by [facility] on 8/01/17 for yearly DM eye exam  Patient here today for yearly DM eye exam  No noticeable changes in vision   Wears PAL glasses (broken)  No other complaints  Blood sugar stable   No drops          Last edited by Nya Wilkinson, PCT on 12/20/2018  1:30 PM.   (History)            Assessment /Plan     For exam results, see Encounter Report.    Type 2 diabetes mellitus without retinopathy    Essential hypertension    Glaucoma screening    Astigmatism with presbyopia, bilateral    Status post cataract extraction and insertion of intraocular lens, right    Status post cataract extraction and insertion of intraocular lens, left      No diabetic or hypertensive retinopathy in either eye.  Glaucoma screening negative in both eyes.  Updated glasses prescription.  Return to clinic 1 yr.

## 2018-12-20 NOTE — PROGRESS NOTES
Patient ID: Cassandra Campos is a 68 y.o. female.    Chief Complaint: Pain of the Right Wrist      HPI: Cassandra Campos  is a 68 y.o. female who c/o Pain of the Right Wrist   for duration of 2 weeks.  She denies any inciting injury.  Pain level is 9/10 in severity.  Quality is aching and throbbing.  It is intermittent in nature.  Alleviating factors include a volar wrist splint.  Aggravating factors include repetitive use as well as movement of the wrist. She denies associated numbness and tingling.    Past Medical History:   Diagnosis Date    Arthritis     hands    Borderline glaucoma     Gastritis     upper GI 2/2017    Hydradenitis     Hyperlipidemia     Hypertension     Insomnia     Migraines 02/01/2000    Morbid obesity due to excess calories 11/1/2016    Nasal septum perforation     Obesity     Pneumonia     Restrictive airway disease     Sleep apnea     SVT (supraventricular tachycardia) 09/2013    Type 2 diabetes mellitus     Type II or unspecified type diabetes mellitus without mention of complication, not stated as uncontrolled 05/20/2013     Past Surgical History:   Procedure Laterality Date    ARTHROGRAM Left 5/25/2016    Performed by Jayro Pedraza Sr., [facility] at [facility] OR    ARTHROSCOPY-KNEE Right 6/16/2017    Performed by [facility] Sr[facility] at [facility] OR    ARTHROSCOPY-KNEE W/ CHONDROPLASTY Right 6/16/2017    [facility] by [facility] at [facility] OR    ARTHROSCOPY-MENISCECTOMY Right 6/16/2017    Per[facility] by [facility] Sr[facility] at [facility] OR    AXILLARY HIDRADENITIS EXCISION      BONE EXOSTOSIS EXCISION Right 7/25/2018    Procedure: EXCISION, EXOSTOSIS;  Surgeon: Jayro Pedraza Sr., MD;  Location: [facility] OR;  Service: Orthopedics;  Laterality: Right;    BREAST BIOPSY      BREAST LUMPECTOMY Bilateral 07/1998    Benign    BURSECTOMY, OLECRANON Right 7/25/2018    [facility] by [facility] Sr[facility] at [facility] OR    CARPAL TUNNEL RELEASE      bilateral    CATARACT EXTRACTION  Bilateral     OU     SECTION  1979    CHOLECYSTECTOMY  2014    CYST REMOVAL  2015    sebaceous cyst removed from face    ESOPHAGOGASTRODUODENOSCOPY (EGD) N/A 2017    Performed by R[facility]ad Watson [facility] at [facility] ENDO    EXCISION, EXOSTOSIS Right 2018    Per[facility] by [facility] Sr[facility] at [facility] OR    gastric sleeve  2017    Dr. Watson    KNEE SURGERY Right     MICROFRACTURE Right 2017    Performed by [facility] Sr[facility] at [facility] OR    OLECRANON BURSECTOMY Right 2018    Procedure: BURSECTOMY, OLECRANON;  Surgeon: Jayor Pedraza Sr., MD;  Location: [facility] OR;  Service: Orthopedics;  Laterality: Right;    RELEASE-FINGER-TRIGGER Right 2017    Performed by [facility] Sr[facility] at [facility] OR    RELEASE-FINGER-TRIGGER Right 2015    Performed by [facility] Sr[facility] at [facility] OR    SYNOVECTOMY-KNEE Right 2017    Performed by [facility] Sr[facility] at [facility] OR    TENOSYNOVECTOMY Right 2017    Performed by [facility] Sr[facility] at [facility] OR    TENOSYNOVECTOMY Right 2015    Performed by [facility] Sr[facility] at [facility] OR    TONSILLECTOMY, ADENOIDECTOMY  1980s    TRIGGER FINGER RELEASE Right 2015    Dr. Milo HALL ROBOT ASSISTED LAPAROSCOPIC SLEEVE-GASTRECTOMY N/A 2017    Performed by [facility]ad Watson MD at [facility] OR     Family History   Problem Relation Age of Onset    Prostate cancer Brother     Diabetes Maternal Aunt      Social History     Socioeconomic History    Marital status:      Spouse name: Not on file    Number of children: 1    Years of education: Not on file    Highest education level: Not on file   Social Needs    Financial resource strain: Not on file    Food insecurity - worry: Not on file    Food insecurity - inability: Not on file    Transportation needs - medical: Not on file    Transportation needs - non-medical: Not on file   Occupational History    Occupation:  aid   Tobacco Use    Smoking  status: Former Smoker     Packs/day: 0.50     Years: 30.00     Pack years: 15.00     Types: Cigarettes     Start date: 1970     Last attempt to quit: 2012     Years since quittin.9    Smokeless tobacco: Never Used   Substance and Sexual Activity    Alcohol use: Yes     Comment: rarely // wine  No alcohol prior to surgery    Drug use: No    Sexual activity: Not Currently     Partners: Male   Other Topics Concern    Not on file   Social History Narrative    Single, part-time teacher. Masters degree biology.         Medication List           Accurate as of 18  3:37 PM. If you have any questions, ask your nurse or doctor.               START taking these medications    meloxicam 7.5 MG tablet  Commonly known as:  MOBIC  Take 1 tablet (7.5 mg total) by mouth once daily. Take with food.  DC if develop GI side effects  Started by:  Carlita Macdonald PA-C        CHANGE how you take these medications    metFORMIN 500 MG 24 hr tablet  Commonly known as:  GLUCOPHAGE-XR  Take 2 tablets (1,000 mg total) by mouth once daily.  What changed:  when to take this        CONTINUE taking these medications    ACCU-CHEK FASTCLIX LANCING DEV Misc  Generic drug:  lancets  USE ONE TIME DAILY     ACCU-CHEK SMARTVIEW TEST STRIP Strp  Generic drug:  blood sugar diagnostic  TEST ONE TIME DAILY     albuterol 90 mcg/actuation inhaler  Commonly known as:  PROVENTIL/VENTOLIN HFA  Inhale 2 puffs into the lungs every 4 to 6 hours as needed for Wheezing.     amitriptyline 100 MG tablet  Commonly known as:  ELAVIL  Take 1 tablet (100 mg total) by mouth nightly.     blood-glucose meter kit  Use as instructed     ciclopirox 8 % Soln  Commonly known as:  PENLAC  Apply to affected toenails at night time DAILY. On  day, file nails down, clean all nails with alcohol and restart application process.     eszopiclone 3 mg Tab  Commonly known as:  LUNESTA  TAKE 1 TABLET EVERY NIGHT AS NEEDED     guaifenesin-codeine 100-10 mg/5 ml   mg/5 mL syrup  Commonly known as:  TUSSI-ORGANIDIN NR     HYDROcodone-acetaminophen 5-325 mg per tablet  Commonly known as:  NORCO  Take 1 tablet by mouth every 8 (eight) hours as needed for Pain.     metoprolol succinate 50 MG 24 hr tablet  Commonly known as:  TOPROL-XL  TAKE 1 TABLET EVERY DAY     omeprazole 20 MG capsule  Commonly known as:  PRILOSEC  TAKE 1 CAPSULE EVERY DAY     ONE DAILY MULTIVITAMIN per tablet  Generic drug:  multivitamin     pravastatin 40 MG tablet  Commonly known as:  PRAVACHOL  TAKE 1 TABLET BY MOUTH EVERY DAY           Where to Get Your Medications      These medications were sent to [facility]/pharmacy #[id number] - [place], [place] - [address] AT AT [facility]  [address], [place] [address]    Phone:  [phone]   · meloxicam 7.5 MG tablet       Review of patient's allergies indicates:  No Known Allergies        Objective:        General    Nursing note and vitals reviewed.  Constitutional: She is oriented to person, place, and time. She appears well-developed and well-nourished.   HENT:   Head: Normocephalic and atraumatic.   Eyes: EOM are normal.   Cardiovascular: Normal rate and regular rhythm.    Pulmonary/Chest: Effort normal.   Abdominal: Soft.   Neurological: She is alert and oriented to person, place, and time.   Psychiatric: She has a normal mood and affect. Her behavior is normal.             Right Hand/Wrist Exam     Inspection   Scars: Wrist - absent Hand -  absent  Effusion: Wrist - absent Hand -  absent  Bruising: Wrist - absent Hand -  absent  Deformity: Wrist - deformity Hand -  deformity    Tenderness   The patient is tender to palpation of the radial area.    Range of Motion     Wrist   Extension: normal   Flexion: normal   Pronation: normal   Supination: normal     Tests   Phalens Sign: negative  Tinel's sign (median nerve): negative  Finkelstein's test: positive    Atrophy   Thenar:  negative  Hypothenar:  negative  Intrinsic:  negative  1st  Dorsal Interosseous: negative    Other     Neuorologic Exam    Median Distribution: normal  Ulnar Distribution: normal  Radial Distribution: normal    Comments:  TTP 1st dorsal compartment      Left Hand/Wrist Exam     Inspection   Scars: Wrist - absent Hand -  absent  Effusion: Wrist - absent Hand -  absent  Bruising: Wrist - absent Hand -  absent  Deformity: Wrist - absent Hand -  absent    Range of Motion     Wrist   Extension: normal   Flexion: normal   Pronation: normal   Supination: normal       Right Elbow Exam     Tests   Tinel's sign (cubital tunnel): negative          Muscle Strength   Right Upper Extremity   Wrist extension: 5/5/5   Wrist flexion: 5/5/5   : 5/5/5   Thumb - APB: 4/5  Pinch Mechanism: 5/5  Left Upper Extremity  Wrist extension: 5/5/5   Wrist flexion: 5/5/5   :  5/5/5   Pinch Mechanism: 5/5    Vascular Exam       Capillary Refill  Right Hand: normal capillary refill            Xray:   Right wrist from today images and report were reviewed today.  I agree with the radiologist's interpretation.  No acute fracture, subluxation, or dislocation is seen.  There is a small calcific focus seen on the lateral projection projecting about the distal carpal row.    Assessment:       Encounter Diagnosis   Name Primary?    De Quervain's tenosynovitis, right Yes          Plan:       Cassandra was seen today for pain.    Diagnoses and all orders for this visit:    De Quervain's tenosynovitis, right  -     methylPREDNISolone acetate injection 40 mg  -     meloxicam (MOBIC) 7.5 MG tablet; Take 1 tablet (7.5 mg total) by mouth once daily. Take with food.  DC if develop GI side effects        Cassandra Campos comes in today with the above problems.  Is an established patient with a new problem.  She has de Quervain tenosynovitis of the thumb.  We have discussed risks and benefits of a steroid injection into the de Quervain tenosynovitis.  She wishes to proceed. I also recommend splint immobilization  using a thumb spica splint rather than the volar wrist splint.  I have also given her prescription for meloxicam as above. She can follow up on an as-needed basis.  If she does not get good symptomatic relief, I would consider to occupational therapy referral sources a referral to 1 of our surgeons to discuss a de Quervain release.  She has asked about narcotic pain prescription.  I have advised I would not prescribe narcotics for this.  She did stated that she would go to a clinic elsewhere if the injection and brace did not help.  I told her that that was perfectly fine and up to her to decide.  As it would be the decision of the provider she sees as to whether not they would prescribe narcotics for this.  However, it is my belief that narcotics are not indicated at this time. She verbalizes understanding and agrees with the above/    No Follow-up on file.    Right Dequervain's Injection Report:  After verbal consent was obtained for right wrist dequervain's injection, patient ID, site, and side were verified.  The  right wrist was sterilly prepped in the standard fashion.  A 22-gauge needle was introduced into right wrist without complication. The rightt wrist dequervain's tenosynovitis was then injected with 10 mg lidocaine plain and 40 mg depomedrol.  A sterile bandaid was applied.  The patient was informed to apply an ice pack approximately 10min once arriving home and not to do anything strenuous for 24hours.  She was instructed to call if there were any problems. The patient was discharged in stable condition.    The patient understands, chooses and consents to this plan and accepts all   the risks which include but are not limited to the risks mentioned above.     Disclaimer: This note was prepared using a voice recognition system and is likely to have sound alike errors within the text.

## 2019-01-02 ENCOUNTER — PATIENT MESSAGE (OUTPATIENT)
Dept: INTERNAL MEDICINE | Facility: CLINIC | Age: 69
End: 2019-01-02

## 2019-01-02 DIAGNOSIS — E11.9 DM II (DIABETES MELLITUS, TYPE II), CONTROLLED: ICD-10-CM

## 2019-01-02 DIAGNOSIS — E11.9 CONTROLLED TYPE 2 DIABETES MELLITUS WITHOUT COMPLICATION, WITHOUT LONG-TERM CURRENT USE OF INSULIN: ICD-10-CM

## 2019-01-02 RX ORDER — METFORMIN HYDROCHLORIDE 500 MG/1
1000 TABLET, EXTENDED RELEASE ORAL DAILY
Qty: 180 TABLET | Refills: 3 | OUTPATIENT
Start: 2019-01-02

## 2019-01-02 RX ORDER — METFORMIN HYDROCHLORIDE 500 MG/1
TABLET, EXTENDED RELEASE ORAL
Qty: 180 TABLET | Refills: 3 | Status: SHIPPED | OUTPATIENT
Start: 2019-01-02 | End: 2020-03-06

## 2019-01-02 RX ORDER — BLOOD SUGAR DIAGNOSTIC
STRIP MISCELLANEOUS
Qty: 100 STRIP | Refills: 3 | Status: SHIPPED | OUTPATIENT
Start: 2019-01-02 | End: 2020-05-19

## 2019-01-03 ENCOUNTER — NURSE TRIAGE (OUTPATIENT)
Dept: ADMINISTRATIVE | Facility: CLINIC | Age: 69
End: 2019-01-03

## 2019-01-03 ENCOUNTER — PATIENT MESSAGE (OUTPATIENT)
Dept: INTERNAL MEDICINE | Facility: CLINIC | Age: 69
End: 2019-01-03

## 2019-01-03 NOTE — TELEPHONE ENCOUNTER
"Woke up sweating this am, felt dizzy, and had a slight headache.  Still has a slight headache, but the sweating is gone, feels a little light headed when she stands up.  176 blood glucose while on the line with me.     Pt states she's been drinking a lot of liquids lately, and she checked her bp while on the phone with me, she is hypotensive.  Advised ER or UCC now.    Reason for Disposition   Patient sounds very sick or weak to the triager    Answer Assessment - Initial Assessment Questions  1. DESCRIPTION: "Describe your dizziness."      Lightheaded this am upon waking and when she tries to stand up  2. LIGHTHEADED: "Do you feel lightheaded?" (e.g., somewhat faint, woozy, weak upon standing)      yes  3. VERTIGO: "Do you feel like either you or the room is spinning or tilting?" (i.e. vertigo)      no  4. SEVERITY: "How bad is it?"  "Do you feel like you are going to faint?" "Can you stand and walk?"    - MILD - walking normally    - MODERATE - interferes with normal activities (e.g., work, school)     - SEVERE - unable to stand, requires support to walk, feels like passing out now.       mild  5. ONSET:  "When did the dizziness begin?"      This am  6. AGGRAVATING FACTORS: "Does anything make it worse?" (e.g., standing, change in head position)      Just when she tried to stand up  7. HEART RATE: "Can you tell me your heart rate?" "How many beats in 15 seconds?"  (Note: not all patients can do this)        87/64, pulse 104, 88/66 pulse 86  8. CAUSE: "What do you think is causing the dizziness?"      unsure  9. RECURRENT SYMPTOM: "Have you had dizziness before?" If so, ask: "When was the last time?" "What happened that time?"      Can't remember  10. OTHER SYMPTOMS: "Do you have any other symptoms?" (e.g., fever, chest pain, vomiting, diarrhea, bleeding)        none  11. PREGNANCY: "Is there any chance you are pregnant?" "When was your last menstrual period?"        Na    Protocols used: ST DIZZINESS - " [id number]

## 2019-01-05 NOTE — TELEPHONE ENCOUNTER
Please check patient chart.  If she has not made an appointment with Cardiology/Dr. DOREEN Pelaez, please contact patient to schedule that.  It is a follow-up for episode of SVT.    I spoke with the patient.  She had only been taking 1/2 of her 50 mg metoprolol.  She is now taking 1 full tablet daily since her visit to the emergency department and episode of SVT with adenosine treatment on 01/03/2019.    Patient is advised to make an appointment with Dr. Pelaez, cards, for recheck    .  She also notes she was seen at a fast track at [facility] [address] on 12/20/2018 and diagnosed with bilateral pneumonia.  She was placed on a Z-Andre and given some anti-inflammatories, meloxicam which she is still taking.  Reviewed that she had a chest x-ray done 01/03/2018 at [facility] and there was no sign of infiltrate on that chest x-ray.  She states she is doing well and I advised no sign continuing infection.

## 2019-01-07 ENCOUNTER — TELEPHONE (OUTPATIENT)
Dept: CARDIOLOGY | Facility: CLINIC | Age: 69
End: 2019-01-07

## 2019-01-07 NOTE — TELEPHONE ENCOUNTER
Spoke with pt and made follow up.  Pt also wanted to know if she should increase her Metoprolol based on [facility] visit on 1/3.  Will ask Dr. Pelaez to advise.    ----- Message from Rica Rodriguez sent at 1/7/2019  3:41 PM CST -----  pls work pt in for 1/3 [facility]..[phone] (home)

## 2019-01-07 NOTE — TELEPHONE ENCOUNTER
Called patient to schedule an appointment with cardiology.  Received no answer.  Left a message and sent a message through [facility].

## 2019-01-09 ENCOUNTER — PATIENT MESSAGE (OUTPATIENT)
Dept: INTERNAL MEDICINE | Facility: CLINIC | Age: 69
End: 2019-01-09

## 2019-01-09 ENCOUNTER — TELEPHONE (OUTPATIENT)
Dept: CARDIOLOGY | Facility: CLINIC | Age: 69
End: 2019-01-09

## 2019-01-09 NOTE — TELEPHONE ENCOUNTER
"Spoke with pt who stated when she went to [facility] her HR was 110 and BP was 101/71.  She stated ER  Said she should increase metoprolol.  Pt wants to know if she should.  Pt stated her HR is normally "high" and this has happened before.  Pt states she takes Amitriptyline and knows this can raise the pulse.  Pts pulse today is 95 and BP is 113/71.  Should she increase metoprolol?  If increase is needed, a new RX needs to be sent to [facility] [address].  Will ask Dr. Pelaez to advise.  Will return call to pt on cell as she is going out of town.    "

## 2019-01-09 NOTE — TELEPHONE ENCOUNTER
Ok  Check BP and Pulse at home.  Keep SBP > 100 mmHG and pulse > 55 bpm.  If below above parameters, decrease Metoprolol dose

## 2019-01-17 ENCOUNTER — OFFICE VISIT (OUTPATIENT)
Dept: INTERNAL MEDICINE | Facility: CLINIC | Age: 69
End: 2019-01-17
Payer: MEDICARE

## 2019-01-17 VITALS
BODY MASS INDEX: 36.08 KG/M2 | WEIGHT: 203.69 LBS | SYSTOLIC BLOOD PRESSURE: 110 MMHG | TEMPERATURE: 97 F | HEART RATE: 92 BPM | DIASTOLIC BLOOD PRESSURE: 82 MMHG | OXYGEN SATURATION: 96 %

## 2019-01-17 DIAGNOSIS — I47.10 PAROXYSMAL SVT (SUPRAVENTRICULAR TACHYCARDIA): ICD-10-CM

## 2019-01-17 DIAGNOSIS — B97.89 VIRAL SORE THROAT: Primary | ICD-10-CM

## 2019-01-17 DIAGNOSIS — J02.8 VIRAL SORE THROAT: Primary | ICD-10-CM

## 2019-01-17 PROCEDURE — 99212 OFFICE O/P EST SF 10 MIN: CPT | Mod: HCNC,S$GLB,, | Performed by: FAMILY MEDICINE

## 2019-01-17 PROCEDURE — 99499 RISK ADDL DX/OHS AUDIT: ICD-10-PCS | Mod: HCNC,S$GLB,, | Performed by: FAMILY MEDICINE

## 2019-01-17 PROCEDURE — 3079F PR MOST RECENT DIASTOLIC BLOOD PRESSURE 80-89 MM HG: ICD-10-PCS | Mod: CPTII,HCNC,S$GLB, | Performed by: FAMILY MEDICINE

## 2019-01-17 PROCEDURE — 3074F SYST BP LT 130 MM HG: CPT | Mod: CPTII,HCNC,S$GLB, | Performed by: FAMILY MEDICINE

## 2019-01-17 PROCEDURE — 99212 PR OFFICE/OUTPT VISIT, EST, LEVL II, 10-19 MIN: ICD-10-PCS | Mod: HCNC,S$GLB,, | Performed by: FAMILY MEDICINE

## 2019-01-17 PROCEDURE — 3074F PR MOST RECENT SYSTOLIC BLOOD PRESSURE < 130 MM HG: ICD-10-PCS | Mod: CPTII,HCNC,S$GLB, | Performed by: FAMILY MEDICINE

## 2019-01-17 PROCEDURE — 99999 PR PBB SHADOW E&M-EST. PATIENT-LVL III: CPT | Mod: PBBFAC,HCNC,, | Performed by: FAMILY MEDICINE

## 2019-01-17 PROCEDURE — 3288F FALL RISK ASSESSMENT DOCD: CPT | Mod: CPTII,HCNC,S$GLB, | Performed by: FAMILY MEDICINE

## 2019-01-17 PROCEDURE — 99499 UNLISTED E&M SERVICE: CPT | Mod: HCNC,S$GLB,, | Performed by: FAMILY MEDICINE

## 2019-01-17 PROCEDURE — 3288F PR FALLS RISK ASSESSMENT DOCUMENTED: ICD-10-PCS | Mod: CPTII,HCNC,S$GLB, | Performed by: FAMILY MEDICINE

## 2019-01-17 PROCEDURE — 99999 PR PBB SHADOW E&M-EST. PATIENT-LVL III: ICD-10-PCS | Mod: PBBFAC,HCNC,, | Performed by: FAMILY MEDICINE

## 2019-01-17 PROCEDURE — 1100F PR PT FALLS ASSESS DOC 2+ FALLS/FALL W/INJURY/YR: ICD-10-PCS | Mod: CPTII,HCNC,S$GLB, | Performed by: FAMILY MEDICINE

## 2019-01-17 PROCEDURE — 3079F DIAST BP 80-89 MM HG: CPT | Mod: CPTII,HCNC,S$GLB, | Performed by: FAMILY MEDICINE

## 2019-01-17 PROCEDURE — 1100F PTFALLS ASSESS-DOCD GE2>/YR: CPT | Mod: CPTII,HCNC,S$GLB, | Performed by: FAMILY MEDICINE

## 2019-01-17 NOTE — PATIENT INSTRUCTIONS

## 2019-01-17 NOTE — PROGRESS NOTES
Subjective:       Patient ID: Cassandra Campos is a 68 y.o. female.    Chief Complaint: Annual Exam    Sore Throat    This is a new problem. The current episode started in the past 7 days. There has been no fever. The pain is at a severity of 3/10. Associated symptoms include congestion and coughing. Pertinent negatives include no ear pain, hoarse voice, plugged ear sensation, shortness of breath or trouble swallowing.     Review of Systems   HENT: Positive for congestion and sore throat. Negative for ear pain, hoarse voice and trouble swallowing.    Respiratory: Positive for cough. Negative for shortness of breath.        Objective:      Physical Exam   Constitutional: She is oriented to person, place, and time. She appears well-developed and well-nourished.   HENT:   Head: Normocephalic and atraumatic.   Right Ear: Tympanic membrane, external ear and ear canal normal.   Left Ear: Tympanic membrane, external ear and ear canal normal.   Nose: Nose normal.   Mouth/Throat: Oropharynx is clear and moist. No oropharyngeal exudate.   Left tonsillar fullness - no erythema no exudate   Eyes: Conjunctivae and EOM are normal.   Neck: Neck supple. No thyromegaly present.   Cardiovascular: Normal rate, regular rhythm and normal heart sounds.   Pulmonary/Chest: Effort normal and breath sounds normal. No respiratory distress.   Lymphadenopathy:     She has no cervical adenopathy.   Neurological: She is alert and oriented to person, place, and time.   Skin: Skin is warm and dry.   Psychiatric: She has a normal mood and affect. Her behavior is normal.         Assessment/Plan:     1. Viral sore throat     2. Paroxysmal SVT (supraventricular tachycardia)     asked to pls Follow up with Dr Pelaez.  jerome given.

## 2019-01-18 ENCOUNTER — TELEPHONE (OUTPATIENT)
Dept: INTERNAL MEDICINE | Facility: CLINIC | Age: 69
End: 2019-01-18

## 2019-01-18 NOTE — TELEPHONE ENCOUNTER
Called patient to advise that appointment has been changed.  Received no answer.  Left her a message.

## 2019-01-18 NOTE — TELEPHONE ENCOUNTER
----- Message from Carter Abraham sent at 1/18/2019  4:08 PM CST -----  Contact: Pt.   Pt is calling regarding requesting to have nurse call pt. Pt states that she would like to reschedule nurse visit appt for 02/19/2019 @ 10:00 at the [facility]. .[phone] (home)         .Thank You  Adrianne Abraham

## 2019-02-13 DIAGNOSIS — G47.00 INSOMNIA, UNSPECIFIED TYPE: ICD-10-CM

## 2019-02-13 RX ORDER — ESZOPICLONE 3 MG/1
TABLET, FILM COATED ORAL
Qty: 90 TABLET | Refills: 1 | Status: SHIPPED | OUTPATIENT
Start: 2019-02-13 | End: 2019-08-01 | Stop reason: SDUPTHER

## 2019-02-13 NOTE — TELEPHONE ENCOUNTER
Spoke to patient and was advised that April was faxing over her refill request and a PA notification.  Advised patient that faxes have not come over yet. Patient verbally understood.

## 2019-02-13 NOTE — TELEPHONE ENCOUNTER
----- Message from Maura Cho sent at 2/13/2019  3:34 PM CST -----  Contact: pt  Type:  Needs Medical Advice    Who Called: Pt  Symptoms (please be specific): N/A   How long has patient had these symptoms:  N/A  Pharmacy name and phone #: N/A  Would the patient rather a call back or a response via [facility]? Call back  Best Call Back Number: [phone]  Additional Information: She is calling in regards to getting a prior authorization for her medication called Ezopiclone and [facility] has sent in the forms.

## 2019-02-13 NOTE — TELEPHONE ENCOUNTER
----- Message from Milton Londono sent at 2/13/2019 10:09 AM CST -----  Contact: Pt  Caller is calling to inform the staff that April has faxed over a request for her medication. Please give pt a andrew at [phone]

## 2019-02-18 ENCOUNTER — TELEPHONE (OUTPATIENT)
Dept: INTERNAL MEDICINE | Facility: CLINIC | Age: 69
End: 2019-02-18

## 2019-02-18 NOTE — TELEPHONE ENCOUNTER
Spoke with pt, states that pharmacy is telling her she needs a PA for her med. Will check to see if anything was faxed over. Will call pharmacy to start PA if no paper received.

## 2019-02-18 NOTE — TELEPHONE ENCOUNTER
----- Message from Mary Vela sent at 2/18/2019  2:10 PM CST -----  Type:  RX Refill Request    Who Called: pt: Cassandra  Refill or New Rx: refill  RX Name and Strength:Esqclopiclone??????  How is the patient currently taking it? (ex. 1XDay): takes once daily  Is this a 30 day or 90 day RX:90 days  Preferred Pharmacy with phone number: [facility]  Local or Mail Order: Mail Order  Ordering Provider: Dr Maldonado  Would the patient rather a call back or a response via [facility]?  Call back  Best Call Back Number: [phone]  Additional Information:  Pharmacy is needing a prior authorization to fill her med//she has been waiting since last Thursday//2-14-19//please call asap//rg/herman

## 2019-02-19 ENCOUNTER — OFFICE VISIT (OUTPATIENT)
Dept: PODIATRY | Facility: CLINIC | Age: 69
End: 2019-02-19
Payer: MEDICARE

## 2019-02-19 ENCOUNTER — LAB VISIT (OUTPATIENT)
Dept: LAB | Facility: HOSPITAL | Age: 69
End: 2019-02-19
Attending: FAMILY MEDICINE
Payer: MEDICARE

## 2019-02-19 VITALS
HEART RATE: 103 BPM | DIASTOLIC BLOOD PRESSURE: 71 MMHG | WEIGHT: 203.69 LBS | HEIGHT: 63 IN | BODY MASS INDEX: 36.09 KG/M2 | SYSTOLIC BLOOD PRESSURE: 131 MMHG

## 2019-02-19 DIAGNOSIS — E11.9 CONTROLLED TYPE 2 DIABETES MELLITUS WITHOUT COMPLICATION, WITHOUT LONG-TERM CURRENT USE OF INSULIN: ICD-10-CM

## 2019-02-19 DIAGNOSIS — M21.612 BILATERAL BUNIONS: ICD-10-CM

## 2019-02-19 DIAGNOSIS — M21.611 BILATERAL BUNIONS: ICD-10-CM

## 2019-02-19 DIAGNOSIS — B35.1 DERMATOPHYTOSIS OF NAIL: Primary | ICD-10-CM

## 2019-02-19 LAB
ALBUMIN SERPL BCP-MCNC: 4 G/DL
ALP SERPL-CCNC: 108 U/L
ALT SERPL W/O P-5'-P-CCNC: 36 U/L
ANION GAP SERPL CALC-SCNC: 9 MMOL/L
AST SERPL-CCNC: 29 U/L
BILIRUB SERPL-MCNC: 0.5 MG/DL
BUN SERPL-MCNC: 12 MG/DL
CALCIUM SERPL-MCNC: 10.2 MG/DL
CHLORIDE SERPL-SCNC: 103 MMOL/L
CO2 SERPL-SCNC: 29 MMOL/L
CREAT SERPL-MCNC: 0.8 MG/DL
EST. GFR  (AFRICAN AMERICAN): >60 ML/MIN/1.73 M^2
EST. GFR  (NON AFRICAN AMERICAN): >60 ML/MIN/1.73 M^2
ESTIMATED AVG GLUCOSE: 128 MG/DL
GLUCOSE SERPL-MCNC: 108 MG/DL
HBA1C MFR BLD HPLC: 6.1 %
POTASSIUM SERPL-SCNC: 4.4 MMOL/L
PROT SERPL-MCNC: 8.6 G/DL
SODIUM SERPL-SCNC: 141 MMOL/L

## 2019-02-19 PROCEDURE — 3288F FALL RISK ASSESSMENT DOCD: CPT | Mod: HCNC,CPTII,S$GLB, | Performed by: PODIATRIST

## 2019-02-19 PROCEDURE — 3075F SYST BP GE 130 - 139MM HG: CPT | Mod: HCNC,CPTII,S$GLB, | Performed by: PODIATRIST

## 2019-02-19 PROCEDURE — 99999 PR PBB SHADOW E&M-EST. PATIENT-LVL III: CPT | Mod: PBBFAC,HCNC,, | Performed by: PODIATRIST

## 2019-02-19 PROCEDURE — 36415 COLL VENOUS BLD VENIPUNCTURE: CPT | Mod: HCNC

## 2019-02-19 PROCEDURE — 1100F PR PT FALLS ASSESS DOC 2+ FALLS/FALL W/INJURY/YR: ICD-10-PCS | Mod: HCNC,CPTII,S$GLB, | Performed by: PODIATRIST

## 2019-02-19 PROCEDURE — 99213 OFFICE O/P EST LOW 20 MIN: CPT | Mod: HCNC,S$GLB,, | Performed by: PODIATRIST

## 2019-02-19 PROCEDURE — 3078F PR MOST RECENT DIASTOLIC BLOOD PRESSURE < 80 MM HG: ICD-10-PCS | Mod: HCNC,CPTII,S$GLB, | Performed by: PODIATRIST

## 2019-02-19 PROCEDURE — 3075F PR MOST RECENT SYSTOLIC BLOOD PRESS GE 130-139MM HG: ICD-10-PCS | Mod: HCNC,CPTII,S$GLB, | Performed by: PODIATRIST

## 2019-02-19 PROCEDURE — 99999 PR PBB SHADOW E&M-EST. PATIENT-LVL III: ICD-10-PCS | Mod: PBBFAC,HCNC,, | Performed by: PODIATRIST

## 2019-02-19 PROCEDURE — 3078F DIAST BP <80 MM HG: CPT | Mod: HCNC,CPTII,S$GLB, | Performed by: PODIATRIST

## 2019-02-19 PROCEDURE — 83036 HEMOGLOBIN GLYCOSYLATED A1C: CPT | Mod: HCNC

## 2019-02-19 PROCEDURE — 99213 PR OFFICE/OUTPT VISIT, EST, LEVL III, 20-29 MIN: ICD-10-PCS | Mod: HCNC,S$GLB,, | Performed by: PODIATRIST

## 2019-02-19 PROCEDURE — 1100F PTFALLS ASSESS-DOCD GE2>/YR: CPT | Mod: HCNC,CPTII,S$GLB, | Performed by: PODIATRIST

## 2019-02-19 PROCEDURE — 3288F PR FALLS RISK ASSESSMENT DOCUMENTED: ICD-10-PCS | Mod: HCNC,CPTII,S$GLB, | Performed by: PODIATRIST

## 2019-02-19 PROCEDURE — 80053 COMPREHEN METABOLIC PANEL: CPT | Mod: HCNC

## 2019-02-25 ENCOUNTER — PATIENT MESSAGE (OUTPATIENT)
Dept: INTERNAL MEDICINE | Facility: CLINIC | Age: 69
End: 2019-02-25

## 2019-02-26 ENCOUNTER — PATIENT MESSAGE (OUTPATIENT)
Dept: INTERNAL MEDICINE | Facility: CLINIC | Age: 69
End: 2019-02-26

## 2019-02-26 DIAGNOSIS — E78.5 HYPERLIPIDEMIA ASSOCIATED WITH TYPE 2 DIABETES MELLITUS: ICD-10-CM

## 2019-02-26 DIAGNOSIS — E11.9 CONTROLLED TYPE 2 DIABETES MELLITUS WITHOUT COMPLICATION, WITHOUT LONG-TERM CURRENT USE OF INSULIN: Primary | ICD-10-CM

## 2019-02-26 DIAGNOSIS — I10 ESSENTIAL HYPERTENSION: Chronic | ICD-10-CM

## 2019-02-26 DIAGNOSIS — E11.69 HYPERLIPIDEMIA ASSOCIATED WITH TYPE 2 DIABETES MELLITUS: ICD-10-CM

## 2019-03-04 ENCOUNTER — PATIENT MESSAGE (OUTPATIENT)
Dept: INTERNAL MEDICINE | Facility: CLINIC | Age: 69
End: 2019-03-04

## 2019-03-04 ENCOUNTER — TELEPHONE (OUTPATIENT)
Dept: INTERNAL MEDICINE | Facility: CLINIC | Age: 69
End: 2019-03-04

## 2019-03-04 DIAGNOSIS — J98.01 BRONCHOSPASM: ICD-10-CM

## 2019-03-04 DIAGNOSIS — R06.2 WHEEZING: ICD-10-CM

## 2019-03-04 DIAGNOSIS — J01.01 ACUTE RECURRENT MAXILLARY SINUSITIS: ICD-10-CM

## 2019-03-04 DIAGNOSIS — I10 ESSENTIAL HYPERTENSION: ICD-10-CM

## 2019-03-04 DIAGNOSIS — J20.9 ACUTE BRONCHITIS, UNSPECIFIED ORGANISM: ICD-10-CM

## 2019-03-04 RX ORDER — PROMETHAZINE HYDROCHLORIDE AND DEXTROMETHORPHAN HYDROBROMIDE 6.25; 15 MG/5ML; MG/5ML
5 SYRUP ORAL NIGHTLY PRN
Qty: 120 ML | Refills: 0 | Status: SHIPPED | OUTPATIENT
Start: 2019-03-04 | End: 2019-03-12

## 2019-03-04 NOTE — TELEPHONE ENCOUNTER
----- Message from Leela Connor sent at 3/4/2019  3:04 PM CST -----  Contact: pt  States she has a dry cough and its making her throat sore. She doesn't have any fever. She would like to get some cough syrup called in. Pt uses     [facility]/pharmacy #[id number] - [place] - [address] AT AT [facility]  [address]  [place] [address]  Phone: [phone] Fax: [phone]    Please call pt at [phone]. Thank you

## 2019-03-05 ENCOUNTER — PATIENT MESSAGE (OUTPATIENT)
Dept: INTERNAL MEDICINE | Facility: CLINIC | Age: 69
End: 2019-03-05

## 2019-03-05 NOTE — TELEPHONE ENCOUNTER
----- Message from Rosibel Kelechi sent at 3/5/2019  1:39 PM CST -----  Contact: pt  1. What is the name of the medication you are requesting? Robitussin AC, Phenergan is on back order  2. What is the dose? n/a  3. How do you take the medication? Orally, topically, etc? n/a  4. How often do you take this medication? n/a  5. Do you need a 30 day or 90 day supply? n/a  6. How many refills are you requesting? n/a  7. What is your preferred pharmacy and location of the pharmacy? [facility] [address]  8. Who can we contact with further questions? The pt

## 2019-03-06 ENCOUNTER — TELEPHONE (OUTPATIENT)
Dept: INTERNAL MEDICINE | Facility: CLINIC | Age: 69
End: 2019-03-06

## 2019-03-06 ENCOUNTER — OFFICE VISIT (OUTPATIENT)
Dept: INTERNAL MEDICINE | Facility: CLINIC | Age: 69
End: 2019-03-06
Payer: MEDICARE

## 2019-03-06 ENCOUNTER — APPOINTMENT (OUTPATIENT)
Dept: RADIOLOGY | Facility: HOSPITAL | Age: 69
End: 2019-03-06
Attending: FAMILY MEDICINE
Payer: MEDICARE

## 2019-03-06 VITALS
HEART RATE: 103 BPM | WEIGHT: 205.94 LBS | BODY MASS INDEX: 36.48 KG/M2 | SYSTOLIC BLOOD PRESSURE: 118 MMHG | OXYGEN SATURATION: 97 % | TEMPERATURE: 99 F | DIASTOLIC BLOOD PRESSURE: 78 MMHG

## 2019-03-06 DIAGNOSIS — R05.9 COUGH: ICD-10-CM

## 2019-03-06 DIAGNOSIS — J11.1 FLU: ICD-10-CM

## 2019-03-06 DIAGNOSIS — E11.9 CONTROLLED TYPE 2 DIABETES MELLITUS WITHOUT COMPLICATION, WITHOUT LONG-TERM CURRENT USE OF INSULIN: ICD-10-CM

## 2019-03-06 DIAGNOSIS — I10 ESSENTIAL HYPERTENSION: ICD-10-CM

## 2019-03-06 DIAGNOSIS — I47.10 PAROXYSMAL SVT (SUPRAVENTRICULAR TACHYCARDIA): ICD-10-CM

## 2019-03-06 DIAGNOSIS — R05.9 COUGH: Primary | ICD-10-CM

## 2019-03-06 LAB
CTP QC/QA: YES
POC MOLECULAR INFLUENZA A AGN: POSITIVE
POC MOLECULAR INFLUENZA B AGN: NEGATIVE

## 2019-03-06 PROCEDURE — 99214 PR OFFICE/OUTPT VISIT, EST, LEVL IV, 30-39 MIN: ICD-10-PCS | Mod: HCNC,S$GLB,, | Performed by: FAMILY MEDICINE

## 2019-03-06 PROCEDURE — 87502 INFLUENZA DNA AMP PROBE: CPT | Mod: QW,HCNC,S$GLB, | Performed by: FAMILY MEDICINE

## 2019-03-06 PROCEDURE — 3044F HG A1C LEVEL LT 7.0%: CPT | Mod: HCNC,CPTII,S$GLB, | Performed by: FAMILY MEDICINE

## 2019-03-06 PROCEDURE — 1101F PT FALLS ASSESS-DOCD LE1/YR: CPT | Mod: HCNC,CPTII,S$GLB, | Performed by: FAMILY MEDICINE

## 2019-03-06 PROCEDURE — 71046 X-RAY EXAM CHEST 2 VIEWS: CPT | Mod: TC,HCNC,FY,PO

## 2019-03-06 PROCEDURE — 99999 PR PBB SHADOW E&M-EST. PATIENT-LVL IV: CPT | Mod: PBBFAC,HCNC,, | Performed by: FAMILY MEDICINE

## 2019-03-06 PROCEDURE — 3078F PR MOST RECENT DIASTOLIC BLOOD PRESSURE < 80 MM HG: ICD-10-PCS | Mod: HCNC,CPTII,S$GLB, | Performed by: FAMILY MEDICINE

## 2019-03-06 PROCEDURE — 3074F PR MOST RECENT SYSTOLIC BLOOD PRESSURE < 130 MM HG: ICD-10-PCS | Mod: HCNC,CPTII,S$GLB, | Performed by: FAMILY MEDICINE

## 2019-03-06 PROCEDURE — 99214 OFFICE O/P EST MOD 30 MIN: CPT | Mod: HCNC,S$GLB,, | Performed by: FAMILY MEDICINE

## 2019-03-06 PROCEDURE — 99999 PR PBB SHADOW E&M-EST. PATIENT-LVL IV: ICD-10-PCS | Mod: PBBFAC,HCNC,, | Performed by: FAMILY MEDICINE

## 2019-03-06 PROCEDURE — 3074F SYST BP LT 130 MM HG: CPT | Mod: HCNC,CPTII,S$GLB, | Performed by: FAMILY MEDICINE

## 2019-03-06 PROCEDURE — 71046 XR CHEST PA AND LATERAL: ICD-10-PCS | Mod: 26,HCNC,, | Performed by: RADIOLOGY

## 2019-03-06 PROCEDURE — 3078F DIAST BP <80 MM HG: CPT | Mod: HCNC,CPTII,S$GLB, | Performed by: FAMILY MEDICINE

## 2019-03-06 PROCEDURE — 87502 POCT INFLUENZA A/B MOLECULAR: ICD-10-PCS | Mod: QW,HCNC,S$GLB, | Performed by: FAMILY MEDICINE

## 2019-03-06 PROCEDURE — 71046 X-RAY EXAM CHEST 2 VIEWS: CPT | Mod: 26,HCNC,, | Performed by: RADIOLOGY

## 2019-03-06 PROCEDURE — 1101F PR PT FALLS ASSESS DOC 0-1 FALLS W/OUT INJ PAST YR: ICD-10-PCS | Mod: HCNC,CPTII,S$GLB, | Performed by: FAMILY MEDICINE

## 2019-03-06 PROCEDURE — 3044F PR MOST RECENT HEMOGLOBIN A1C LEVEL <7.0%: ICD-10-PCS | Mod: HCNC,CPTII,S$GLB, | Performed by: FAMILY MEDICINE

## 2019-03-06 RX ORDER — ACETAMINOPHEN AND CODEINE PHOSPHATE 300; 15 MG/1; MG/1
TABLET ORAL
Qty: 21 TABLET | Refills: 0 | Status: ON HOLD | OUTPATIENT
Start: 2019-03-06 | End: 2019-05-10 | Stop reason: HOSPADM

## 2019-03-06 RX ORDER — OSELTAMIVIR PHOSPHATE 75 MG/1
75 CAPSULE ORAL 2 TIMES DAILY
Qty: 10 CAPSULE | Refills: 0 | Status: SHIPPED | OUTPATIENT
Start: 2019-03-06 | End: 2019-03-11

## 2019-03-06 NOTE — TELEPHONE ENCOUNTER
----- Message from Quentin Moses sent at 3/6/2019  3:03 PM CST -----  Contact: [phone]   Pt would like to consult with the nurse about Rx medication. Pt is at pharmacy waiting on Tylenol Rx.  Pharmacy has not received Rx for medication.  Pt state that she is very weak and is at the pharmacy waiting.  Please call back at [phone].  [facility]

## 2019-03-06 NOTE — PROGRESS NOTES
Subjective:       Patient ID: Cassandra Campos is a 68 y.o. female.    Chief Complaint: Cough and Sore Throat (all since monday)    Medical history includes diabetes hypertension.  She reports she was hospitalized in the recent past for pneumonia.  She has received flu vaccination and pneumonia vaccination up-to-date.  She reports 2 days duration nasal congestion frequent cough chills low-grade fever fatigue and myalgia.  She also reports that her blood sugars controlled with last A1c of 6.1.  She denies polyuria polydipsia hypoglycemic symptoms.  She denies headache chest pain palpitations.  She has a past history of paroxysmal supraventricular tachycardia.      Cough   This is a new problem. The current episode started yesterday. The problem has been rapidly worsening. The problem occurs constantly. The cough is non-productive. Associated symptoms include chills, a fever, headaches, myalgias, nasal congestion, postnasal drip, rhinorrhea and a sore throat. Pertinent negatives include no chest pain, ear congestion, ear pain, heartburn, hemoptysis, rash, shortness of breath, sweats, weight loss or wheezing. Nothing aggravates the symptoms. Risk factors for lung disease include occupational exposure. She has tried OTC cough suppressant and rest for the symptoms. Her past medical history is significant for bronchitis and pneumonia. There is no history of bronchiectasis, COPD, emphysema or environmental allergies.     Review of Systems   Constitutional: Positive for chills, fatigue and fever. Negative for activity change and weight loss.   HENT: Positive for congestion, postnasal drip, rhinorrhea and sore throat. Negative for ear pain and sinus pressure.    Respiratory: Positive for cough. Negative for hemoptysis, chest tightness, shortness of breath and wheezing.    Cardiovascular: Negative for chest pain, palpitations and leg swelling.        Denies lightheadedness   Gastrointestinal: Negative for diarrhea,  heartburn and nausea.   Genitourinary: Negative for difficulty urinating.   Musculoskeletal: Positive for myalgias.   Skin: Negative for rash.   Allergic/Immunologic: Negative for environmental allergies.   Neurological: Positive for weakness and headaches. Negative for dizziness and light-headedness.       Objective:      Physical Exam   Constitutional: She is oriented to person, place, and time. She appears well-developed and well-nourished. No distress.   HENT:   Right Ear: External ear normal.   Left Ear: External ear normal.   Mouth/Throat: Oropharynx is clear and moist. No oropharyngeal exudate.   Nasal congestion   Neck: Neck supple. No thyromegaly present.   Cardiovascular: Normal rate, regular rhythm and normal heart sounds.   No murmur heard.  Pulmonary/Chest: Effort normal and breath sounds normal. No respiratory distress. She has no wheezes.   Abdominal: Soft. Bowel sounds are normal. She exhibits no mass. There is no tenderness.   Lymphadenopathy:     She has no cervical adenopathy.   Neurological: She is alert and oriented to person, place, and time.   Skin: She is not diaphoretic.       Lab Visit on 02/19/2019   Component Date Value Ref Range Status    Sodium 02/19/2019 141  136 - 145 mmol/L Final    Potassium 02/19/2019 4.4  3.5 - 5.1 mmol/L Final    Chloride 02/19/2019 103  95 - 110 mmol/L Final    CO2 02/19/2019 29  23 - 29 mmol/L Final    Glucose 02/19/2019 108  70 - 110 mg/dL Final    BUN, Bld 02/19/2019 12  8 - 23 mg/dL Final    Creatinine 02/19/2019 0.8  0.5 - 1.4 mg/dL Final    Calcium 02/19/2019 10.2  8.7 - 10.5 mg/dL Final    Total Protein 02/19/2019 8.6* 6.0 - 8.4 g/dL Final    Albumin 02/19/2019 4.0  3.5 - 5.2 g/dL Final    Total Bilirubin 02/19/2019 0.5  0.1 - 1.0 mg/dL Final    Alkaline Phosphatase 02/19/2019 108  55 - 135 U/L Final    AST 02/19/2019 29  10 - 40 U/L Final    ALT 02/19/2019 36  10 - 44 U/L Final    Anion Gap 02/19/2019 9  8 - 16 mmol/L Final    eGFR if   02/19/2019 >60.0  >60 mL/min/1.73 m^2 Final    eGFR if non African American 02/19/2019 >60.0  >60 mL/min/1.73 m^2 Final    Hemoglobin A1C 02/19/2019 6.1* 4.0 - 5.6 % Final    Estimated Avg Glucose 02/19/2019 128  68 - 131 mg/dL Final     Assessment:       1. Cough        Plan:   Flu screen pos chest x-ray no infiltrate rest fluids Tamiflu Tylenol with codeine for cough.  Blood pressure 118/78.  Pulse is 103 but regular  Follow-up if needed.      Cough  -     POCT Influenza A/B Molecular  -     X-Ray Chest PA And Lateral; Future; Expected date: 03/06/2019

## 2019-03-11 ENCOUNTER — PES CALL (OUTPATIENT)
Dept: ADMINISTRATIVE | Facility: CLINIC | Age: 69
End: 2019-03-11

## 2019-03-12 ENCOUNTER — PATIENT MESSAGE (OUTPATIENT)
Dept: INTERNAL MEDICINE | Facility: CLINIC | Age: 69
End: 2019-03-12

## 2019-03-12 RX ORDER — BENZONATATE 200 MG/1
200 CAPSULE ORAL 3 TIMES DAILY PRN
Qty: 30 CAPSULE | Refills: 1 | Status: SHIPPED | OUTPATIENT
Start: 2019-03-12 | End: 2019-03-22

## 2019-04-29 ENCOUNTER — OFFICE VISIT (OUTPATIENT)
Dept: PODIATRY | Facility: CLINIC | Age: 69
End: 2019-04-29
Payer: MEDICARE

## 2019-04-29 ENCOUNTER — CLINICAL SUPPORT (OUTPATIENT)
Dept: CARDIOLOGY | Facility: CLINIC | Age: 69
End: 2019-04-29
Payer: MEDICARE

## 2019-04-29 ENCOUNTER — HOSPITAL ENCOUNTER (OUTPATIENT)
Dept: RADIOLOGY | Facility: HOSPITAL | Age: 69
Discharge: HOME | End: 2019-04-29
Attending: PODIATRIST
Payer: MEDICARE

## 2019-04-29 VITALS
HEIGHT: 63 IN | BODY MASS INDEX: 36.52 KG/M2 | WEIGHT: 206.13 LBS | DIASTOLIC BLOOD PRESSURE: 87 MMHG | SYSTOLIC BLOOD PRESSURE: 134 MMHG | HEART RATE: 105 BPM

## 2019-04-29 DIAGNOSIS — M20.11 HALLUX VALGUS (ACQUIRED), RIGHT FOOT: Primary | ICD-10-CM

## 2019-04-29 DIAGNOSIS — E11.9 CONTROLLED TYPE 2 DIABETES MELLITUS WITHOUT COMPLICATION, WITHOUT LONG-TERM CURRENT USE OF INSULIN: ICD-10-CM

## 2019-04-29 DIAGNOSIS — M79.672 PAIN IN LEFT FOOT: ICD-10-CM

## 2019-04-29 DIAGNOSIS — M20.12 HALLUX VALGUS (ACQUIRED), LEFT FOOT: ICD-10-CM

## 2019-04-29 DIAGNOSIS — M20.11 HALLUX VALGUS (ACQUIRED), RIGHT FOOT: ICD-10-CM

## 2019-04-29 DIAGNOSIS — M79.671 PAIN IN RIGHT FOOT: ICD-10-CM

## 2019-04-29 DIAGNOSIS — E66.01 SEVERE OBESITY (BMI 35.0-39.9) WITH COMORBIDITY: ICD-10-CM

## 2019-04-29 DIAGNOSIS — M20.5X2 HALLUX LIMITUS, ACQUIRED, LEFT: ICD-10-CM

## 2019-04-29 DIAGNOSIS — E55.9 VITAMIN D DEFICIENCY: ICD-10-CM

## 2019-04-29 PROCEDURE — 73630 X-RAY EXAM OF FOOT: CPT | Mod: 50,TC,HCNC

## 2019-04-29 PROCEDURE — 99214 PR OFFICE/OUTPT VISIT, EST, LEVL IV, 30-39 MIN: ICD-10-PCS | Mod: HCNC,S$GLB,, | Performed by: PODIATRIST

## 2019-04-29 PROCEDURE — 3044F PR MOST RECENT HEMOGLOBIN A1C LEVEL <7.0%: ICD-10-PCS | Mod: HCNC,CPTII,S$GLB, | Performed by: PODIATRIST

## 2019-04-29 PROCEDURE — 3079F DIAST BP 80-89 MM HG: CPT | Mod: HCNC,CPTII,S$GLB, | Performed by: PODIATRIST

## 2019-04-29 PROCEDURE — 1101F PR PT FALLS ASSESS DOC 0-1 FALLS W/OUT INJ PAST YR: ICD-10-PCS | Mod: HCNC,CPTII,S$GLB, | Performed by: PODIATRIST

## 2019-04-29 PROCEDURE — 99999 PR PBB SHADOW E&M-EST. PATIENT-LVL IV: ICD-10-PCS | Mod: PBBFAC,HCNC,, | Performed by: PODIATRIST

## 2019-04-29 PROCEDURE — 93005 ELECTROCARDIOGRAM TRACING: CPT | Mod: HCNC,S$GLB,, | Performed by: PODIATRIST

## 2019-04-29 PROCEDURE — 3075F PR MOST RECENT SYSTOLIC BLOOD PRESS GE 130-139MM HG: ICD-10-PCS | Mod: HCNC,CPTII,S$GLB, | Performed by: PODIATRIST

## 2019-04-29 PROCEDURE — 3044F HG A1C LEVEL LT 7.0%: CPT | Mod: HCNC,CPTII,S$GLB, | Performed by: PODIATRIST

## 2019-04-29 PROCEDURE — 93005 EKG 12-LEAD: ICD-10-PCS | Mod: HCNC,S$GLB,, | Performed by: PODIATRIST

## 2019-04-29 PROCEDURE — 1101F PT FALLS ASSESS-DOCD LE1/YR: CPT | Mod: HCNC,CPTII,S$GLB, | Performed by: PODIATRIST

## 2019-04-29 PROCEDURE — 93010 EKG 12-LEAD: ICD-10-PCS | Mod: HCNC,S$GLB,, | Performed by: NUCLEAR MEDICINE

## 2019-04-29 PROCEDURE — 99999 PR PBB SHADOW E&M-EST. PATIENT-LVL IV: CPT | Mod: PBBFAC,HCNC,, | Performed by: PODIATRIST

## 2019-04-29 PROCEDURE — 3075F SYST BP GE 130 - 139MM HG: CPT | Mod: HCNC,CPTII,S$GLB, | Performed by: PODIATRIST

## 2019-04-29 PROCEDURE — 73630 X-RAY EXAM OF FOOT: CPT | Mod: 26,50,HCNC, | Performed by: RADIOLOGY

## 2019-04-29 PROCEDURE — 99214 OFFICE O/P EST MOD 30 MIN: CPT | Mod: HCNC,S$GLB,, | Performed by: PODIATRIST

## 2019-04-29 PROCEDURE — 93010 ELECTROCARDIOGRAM REPORT: CPT | Mod: HCNC,S$GLB,, | Performed by: NUCLEAR MEDICINE

## 2019-04-29 PROCEDURE — 3079F PR MOST RECENT DIASTOLIC BLOOD PRESSURE 80-89 MM HG: ICD-10-PCS | Mod: HCNC,CPTII,S$GLB, | Performed by: PODIATRIST

## 2019-04-29 PROCEDURE — 73630 XR FOOT COMPLETE 3 VIEW BILATERAL: ICD-10-PCS | Mod: 26,50,HCNC, | Performed by: RADIOLOGY

## 2019-04-29 NOTE — H&P (VIEW-ONLY)
Subjective:       Patient ID: Cassandra Campos is a 68 y.o. female.    Chief Complaint: Foot Problem (consult for left bunion sx; pt rates pain at 8/10.)      HPI: Cassandra Campos presents to the office today with complaints of moderate pains to the right and left foot at the great toe due to bunion deformity. Patient states pains are recalcitrant to NSAID therapy and wider width shoe gear. Patient has no had injection therapy to the 1st MTPJ of either limb. Patient has had discomfort to the great toe for the past several months. Patient is indeed interested in surgical intervention and/or cure for alleviation of symptoms. Patient's Primary Care Provider is Denia Maldonado MD. Patient is a DMII without stated pedal complications.  Patient was referred by my colleague Dr. Deanna Soto.  Patient states NSAIDs and/or Tylenol as needed.    Hemoglobin A1C   Date Value Ref Range Status   02/19/2019 6.1 (H) 4.0 - 5.6 % Final     Comment:     ADA Screening Guidelines:  5.7-6.4%  Consistent with prediabetes  >or=6.5%  Consistent with diabetes  High levels of fetal hemoglobin interfere with the HbA1C  assay. Heterozygous hemoglobin variants (HbS, HgC, etc)do  not significantly interfere with this assay.   However, presence of multiple variants may affect accuracy.     08/14/2018 5.7 (H) 4.0 - 5.6 % Final     Comment:     ADA Screening Guidelines:  5.7-6.4%  Consistent with prediabetes  >or=6.5%  Consistent with diabetes  High levels of fetal hemoglobin interfere with the HbA1C  assay. Heterozygous hemoglobin variants (HbS, HgC, etc)do  not significantly interfere with this assay.   However, presence of multiple variants may affect accuracy.     02/12/2018 5.3 4.0 - 5.6 % Final     Comment:     According to ADA guidelines, hemoglobin A1c <7.0% represents  optimal control in non-pregnant diabetic patients. Different  metrics may apply to specific patient populations.   Standards of Medical Care in Diabetes-2016.  For  the purpose of screening for the presence of diabetes:  <5.7%     Consistent with the absence of diabetes  5.7-6.4%  Consistent with increasing risk for diabetes   (prediabetes)  >or=6.5%  Consistent with diabetes  Currently, no consensus exists for use of hemoglobin A1c  for diagnosis of diabetes for children.  This Hemoglobin A1c assay has significant interference with fetal   hemoglobin   (HbF). The results are invalid for patients with abnormal amounts of   HbF,   including those with known Hereditary Persistence   of Fetal Hemoglobin. Heterozygous hemoglobin variants (HbAS, HbAC,   HbAD, HbAE, HbA2) do not significantly interfere with this assay;   however, presence of multiple variants in a sample may impact the %   interference.         Review of patient's allergies indicates:  No Known Allergies    Past Medical History:   Diagnosis Date    Arthritis     hands    Borderline glaucoma     Gastritis     upper GI 2/2017    Hydradenitis     Hyperlipidemia     Hypertension     Insomnia     Migraines 02/01/2000    Morbid obesity due to excess calories 11/1/2016    Nasal septum perforation     Obesity     Pneumonia     Restrictive airway disease     Sleep apnea     SVT (supraventricular tachycardia) 09/2013    Type 2 diabetes mellitus     Type II or unspecified type diabetes mellitus without mention of complication, not stated as uncontrolled 05/20/2013       Family History   Problem Relation Age of Onset    Prostate cancer Brother     Diabetes Maternal Aunt        Social History     Socioeconomic History    Marital status:      Spouse name: Not on file    Number of children: 1    Years of education: Not on file    Highest education level: Not on file   Occupational History    Occupation:  aid   Social Needs    Financial resource strain: Not on file    Food insecurity:     Worry: Not on file     Inability: Not on file    Transportation needs:     Medical: Not on  file     Non-medical: Not on file   Tobacco Use    Smoking status: Former Smoker     Packs/day: 0.50     Years: 30.00     Pack years: 15.00     Types: Cigarettes     Start date: 1970     Last attempt to quit: 2012     Years since quittin.3    Smokeless tobacco: Never Used   Substance and Sexual Activity    Alcohol use: Yes     Comment: rarely // wine  No alcohol prior to surgery    Drug use: No    Sexual activity: Not Currently     Partners: Male   Lifestyle    Physical activity:     Days per week: Not on file     Minutes per session: Not on file    Stress: Not on file   Relationships    Social connections:     Talks on phone: Not on file     Gets together: Not on file     Attends Oriental orthodox service: Not on file     Active member of club or organization: Not on file     Attends meetings of clubs or organizations: Not on file     Relationship status: Not on file   Other Topics Concern    Not on file   Social History Narrative    Single, part-time teacher. Masters degree biology.        Past Surgical History:   Procedure Laterality Date    ARTHROGRAM Left 2016    [facility]    ARTHROSCOPY-KNEE Right 2017    [facility]    ARTHROSCOPY-KNEE W/ CHONDROPLASTY Right 2017    [facility]    ARTHROSCOPY-MENISCECTOMY Right 2017    [facility]    AXILLARY HIDRADENITIS EXCISION      BREAST BIOPSY      BREAST LUMPECTOMY Bilateral 1998    Benign    BURSECTOMY, OLECRANON Right 2018    [facility]    CARPAL TUNNEL RELEASE      bilateral    CATARACT EXTRACTION Bilateral     OU     SECTION  1979    CHOLECYSTECTOMY  2014    CYST REMOVAL  2015    sebaceous cyst removed from face    ESOPHAGOGASTRODUODENOSCOPY (EGD) N/A 2017    [facility]ad [facility] at [facility]    EXCISION,  "EXOSTOSIS Right 7/25/2018    [facility]    gastric sleeve  02/13/2017    Dr. Watson    KNEE SURGERY Right     MICROFRACTURE Right 6/16/2017    [facility] at [facility]    RELEASE-FINGER-TRIGGER Right 6/16/2017    Per[facility] by [facility] at [facility]    RELEASE-FINGER-TRIGGER Right 4/1/2015    [facility]    SYNOVECTOMY-KNEE Right 6/16/2017    Performed [facility] at [facility]    TENOSYNOVECTOMY Right 6/16/2017    Performed [facility] at [facility]    TENOSYNOVECTOMY Right 4/1/2015    Performed [facility] at [facility]    TONSILLECTOMY, ADENOIDECTOMY  1980s    TRIGGER FINGER RELEASE Right april 1, 2015    Dr. Pedraza    XI ROBOT ASSISTED LAPAROSCOPIC SLEEVE-GASTRECTOMY N/A 2/13/2017    Performed by [facility]ad Watson MD at [facility]       Review of Systems   Constitutional: Negative for chills, fatigue and fever.   HENT: Negative for hearing loss.    Eyes: Negative for photophobia and visual disturbance.   Respiratory: Negative for cough, chest tightness, shortness of breath and wheezing.    Cardiovascular: Negative for chest pain and palpitations.   Gastrointestinal: Negative for constipation, diarrhea, nausea and vomiting.   Endocrine: Negative for cold intolerance and heat intolerance.   Genitourinary: Negative for flank pain.   Musculoskeletal: Positive for gait problem. Negative for neck pain and neck stiffness.   Skin: Negative for wound.   Neurological: Negative for light-headedness, numbness and headaches.   Psychiatric/Behavioral: Negative for sleep disturbance.         Objective:   /87 (BP Location: Right arm, Patient Position: Sitting)   Pulse 105   Ht 5' 3" (1.6 m)   Wt 93.5 kg (206 lb 2.1 oz)   BMI 36.51 kg/m²       LOWER EXTREMITY PHYSICAL EXAMINATION    VASCULAR: On the right and left foot, the dorsalis pedis pulse is 2/4 and the posterior tibial pulse is " 2/4. Capillary refill time is less than 3 seconds. Hair growth is sparse, but present on the dorsum of the foot and at the digits. Proximal to distal temperature is warm to warm.    ORTHOPEDIC (right and left):  Mild bunion deformity is noted to the right foot. Upon ROM in the sagittal plan, there is mild pain related at the end ROM, dorsally; no plantar pains at the 1st MTPJ are stated. No pains to palpation of the tibial or the fibular sesamoid. There is a moderately sized medial eminence appreciated. There is minimal dorsal spurring noted. Palpation of the dorsal-lateral aspect of the 1st MTPJ is painful. Hallux abductus is noted.  Hallux interphalangeus is not noted. There is tracking. The hallux is not trackbound. There is no hypermobility noted at the 1st TMTJ. Upon reduction of the IM angle at the 1st metatarsal head, the hallux is not rectus.     DERMATOLOGY: Skin is supple, dry and intact. No ecchymosis is noted. No ulcerations are noted. Skin is supple and moist.     NEUROLOGY: Protective sensation is intact via 5.07 [place] monofilament. Proprioception is intact. Sensation to light touch is intact.     Assessment:     1. Hallux valgus (acquired), right foot    2. Hallux valgus (acquired), left foot    3. Pain in right foot    4. Pain in left foot    5. Controlled type 2 diabetes mellitus without complication, without long-term current use of insulin    6. Severe obesity (BMI 35.0-39.9) with comorbidity    7. Vitamin D deficiency    8. Hallux limitus, acquired, left          Plan:     Hallux valgus (acquired), right foot  Pain in right foot  XRAYS are reviewed in detail with the patient. All questions and concerns regarding findings and its/their implications are outlined and discussed.    Rec. shoe gear with soft and accommodative foot bed and with a high toe box for alleviation of symptoms. Possible EE or EEE in width as well for alleviation of symptoms.    NSAIDs or Tylenol as needed.    Hallux  valgus (acquired), left foot  Pain in left foot  Hallux limitus, left  XRAYS are reviewed in detail with the patient. All questions and concerns regarding findings and its/their implications are outlined and discussed.    Rec. shoe gear with soft and accommodative foot bed and with a high toe box for alleviation of symptoms. Possible EE or EEE in width as well for alleviation of symptoms.    The procedure of (left bunionectomy ([place] vs. [place] (for decompression)) was thoroughly explained to the patient. Its necessity was outlined, including its necessity, implications, advantages and/or disadvantages, and possible complications, if any. Possible complications include recurrence of pathology and/or deformity, infection (cellulitis, drainage, purulence, malodor, etc...), pain, numbness, neuritis, edema, burning, loss of function, need for further surgery, possible need for removal of any implanted hardware, soft tissue contracture and/or scarring, etc... No guarantees were given and/or implied. Post-operative expectations and weightbearing protocol is thoroughly explained the patient, who acknowledges understanding. The patient acknowledges understanding of all the aforementioned, and does sign a consent form, that is witnessed. Preoperative labs and/or EKG and/or XRay Chest or other pertinent imaging, as well as medical clearances are to be reviewed and read over by myself.      Controlled type 2 diabetes mellitus without complication, without long-term current use of insulin  I counseled the patient on his/her Diabetic Mellitus regarding today's clinical examination and objection findings. We did also discuss recent medication changes, pertinent labs and imaging evaluations and other medical consultation notes and progress notes. Greater than 50% of this visit was spent on counseling and coordination of care. Greater than 20 minutes of this appt. was spent on education about the diabetic foot, in relation to  PVD and/or neuropathy, and the prevention of limb loss.     Shoe gear is inspected and wear and proper fit/type. Patient is reminded of the importance of good nutrition and blood sugar control to help prevent podiatric complications of diabetes. Patient instructed on proper foot hygeine. We discussed wearing proper shoe gear, daily foot inspections, never walking without protective shoe gear, never putting sharp instruments to feet.  Patient  will continue to monitor the areas daily, inspect feet, wear protective shoe gear when ambulatory, moisturizer to maintain skin integrity.     Patient's DMI/DMII is managed by Primary Care Provider and/or Endocrinology Advanced Practice Provider. As per the most recent glycohemoglobin level, this patient is at goal.    Severe obesity (BMI 35.0-39.9) with comorbidity  Patient is counseled and reminded concerning the importance of good nutrition and healthy eating habits, which may include blood sugar control, to prevent and/or help podiatric foot and ankle complications.    Surgical Consent Information  Procedure Date: Friday, May 10th, 2019.    Treatment/Procedure: Bunionectomy, left foot.    Sedation: Moderate: Local MAC.    Patient Condition/Indication for Procedure:  To alleviate pain and deformity.    Surgical Coding  Diagnosis: Hallux valgus (acquired), left foot  CPT: 72860          Future Appointments   Date Time Provider Department Center   5/2/2019 11:00 AM Emil Zhang MD [facility] [place] [facility] [place]   5/27/2019 11:30 AM Srinivasan Villanueva DPM [facility] POD  [facility]   5/28/2019 11:45 AM Srinivasan Villanueva DPM [facility] POD  [facility]   7/10/2019  1:45 PM [facility] XR2 [facility] [place]   7/10/2019  2:00 PM [facility]ULMONARY LAB, [facility] [place]   7/10/2019  2:20 PM Nixon Mcdermott MD [facility] [place]

## 2019-04-29 NOTE — PROGRESS NOTES
Subjective:       Patient ID: Cassandra Campos is a 68 y.o. female.    Chief Complaint: Foot Problem (consult for left bunion sx; pt rates pain at 8/10.)      HPI: Cassandra Campos presents to the office today with complaints of moderate pains to the right and left foot at the great toe due to bunion deformity. Patient states pains are recalcitrant to NSAID therapy and wider width shoe gear. Patient has no had injection therapy to the 1st MTPJ of either limb. Patient has had discomfort to the great toe for the past several months. Patient is indeed interested in surgical intervention and/or cure for alleviation of symptoms. Patient's Primary Care Provider is Denia Maldonado MD. Patient is a DMII without stated pedal complications.  Patient was referred by my colleague Dr. Deanna Soto.  Patient states NSAIDs and/or Tylenol as needed.    Hemoglobin A1C   Date Value Ref Range Status   02/19/2019 6.1 (H) 4.0 - 5.6 % Final     Comment:     ADA Screening Guidelines:  5.7-6.4%  Consistent with prediabetes  >or=6.5%  Consistent with diabetes  High levels of fetal hemoglobin interfere with the HbA1C  assay. Heterozygous hemoglobin variants (HbS, HgC, etc)do  not significantly interfere with this assay.   However, presence of multiple variants may affect accuracy.     08/14/2018 5.7 (H) 4.0 - 5.6 % Final     Comment:     ADA Screening Guidelines:  5.7-6.4%  Consistent with prediabetes  >or=6.5%  Consistent with diabetes  High levels of fetal hemoglobin interfere with the HbA1C  assay. Heterozygous hemoglobin variants (HbS, HgC, etc)do  not significantly interfere with this assay.   However, presence of multiple variants may affect accuracy.     02/12/2018 5.3 4.0 - 5.6 % Final     Comment:     According to ADA guidelines, hemoglobin A1c <7.0% represents  optimal control in non-pregnant diabetic patients. Different  metrics may apply to specific patient populations.   Standards of Medical Care in Diabetes-2016.  For  the purpose of screening for the presence of diabetes:  <5.7%     Consistent with the absence of diabetes  5.7-6.4%  Consistent with increasing risk for diabetes   (prediabetes)  >or=6.5%  Consistent with diabetes  Currently, no consensus exists for use of hemoglobin A1c  for diagnosis of diabetes for children.  This Hemoglobin A1c assay has significant interference with fetal   hemoglobin   (HbF). The results are invalid for patients with abnormal amounts of   HbF,   including those with known Hereditary Persistence   of Fetal Hemoglobin. Heterozygous hemoglobin variants (HbAS, HbAC,   HbAD, HbAE, HbA2) do not significantly interfere with this assay;   however, presence of multiple variants in a sample may impact the %   interference.         Review of patient's allergies indicates:  No Known Allergies    Past Medical History:   Diagnosis Date    Arthritis     hands    Borderline glaucoma     Gastritis     upper GI 2/2017    Hydradenitis     Hyperlipidemia     Hypertension     Insomnia     Migraines 02/01/2000    Morbid obesity due to excess calories 11/1/2016    Nasal septum perforation     Obesity     Pneumonia     Restrictive airway disease     Sleep apnea     SVT (supraventricular tachycardia) 09/2013    Type 2 diabetes mellitus     Type II or unspecified type diabetes mellitus without mention of complication, not stated as uncontrolled 05/20/2013       Family History   Problem Relation Age of Onset    Prostate cancer Brother     Diabetes Maternal Aunt        Social History     Socioeconomic History    Marital status:      Spouse name: Not on file    Number of children: 1    Years of education: Not on file    Highest education level: Not on file   Occupational History    Occupation:  aid   Social Needs    Financial resource strain: Not on file    Food insecurity:     Worry: Not on file     Inability: Not on file    Transportation needs:     Medical: Not on  file     Non-medical: Not on file   Tobacco Use    Smoking status: Former Smoker     Packs/day: 0.50     Years: 30.00     Pack years: 15.00     Types: Cigarettes     Start date: 1970     Last attempt to quit: 2012     Years since quittin.3    Smokeless tobacco: Never Used   Substance and Sexual Activity    Alcohol use: Yes     Comment: rarely // wine  No alcohol prior to surgery    Drug use: No    Sexual activity: Not Currently     Partners: Male   Lifestyle    Physical activity:     Days per week: Not on file     Minutes per session: Not on file    Stress: Not on file   Relationships    Social connections:     Talks on phone: Not on file     Gets together: Not on file     Attends Hindu service: Not on file     Active member of club or organization: Not on file     Attends meetings of clubs or organizations: Not on file     Relationship status: Not on file   Other Topics Concern    Not on file   Social History Narrative    Single, part-time teacher. Masters degree biology.        Past Surgical History:   Procedure Laterality Date    ARTHROGRAM Left 2016    Per[facility] Sr[facility] OR    ARTHROSCOPY-KNEE Right 2017    [facility]    ARTHROSCOPY-KNEE W/ CHONDROPLASTY Right 2017    [facility] OR    ARTHROSCOPY-MENISCECTOMY Right 2017    [facility]    AXILLARY HIDRADENITIS EXCISION      BREAST BIOPSY      BREAST LUMPECTOMY Bilateral 1998    Benign    BURSECTOMY, OLECRANON Right 2018    [facility]    CARPAL TUNNEL RELEASE      bilateral    CATARACT EXTRACTION Bilateral     OU     SECTION  1979    CHOLECYSTECTOMY  2014    CYST REMOVAL  2015    sebaceous cyst removed from face    ESOPHAGOGASTRODUODENOSCOPY (EGD) N/A 2017    Performed by R[facility]ad Watson [facility] at [facility] ENDO    EXCISION,  "EXOSTOSIS Right 7/25/2018    [facility] OR    gastric sleeve  02/13/2017    Dr. Watson    KNEE SURGERY Right     MICROFRACTURE Right 6/16/2017    [facility] OR    RELEASE-FINGER-TRIGGER Right 6/16/2017    [facility]    RELEASE-FINGER-TRIGGER Right 4/1/2015    Performed by [facility]    SYNOVECTOMY-KNEE Right 6/16/2017    Performed by [facility]    TENOSYNOVECTOMY Right 6/16/2017    [facility] OR    TENOSYNOVECTOMY Right 4/1/2015    [facility] OR    TONSILLECTOMY, ADENOIDECTOMY  1980s    TRIGGER FINGER RELEASE Right april 1, 2015    Dr. Pedraza    XI ROBOT ASSISTED LAPAROSCOPIC SLEEVE-GASTRECTOMY N/A 2/13/2017    Performed by [facility]ad Watson [facility] at [facility] OR       Review of Systems   Constitutional: Negative for chills, fatigue and fever.   HENT: Negative for hearing loss.    Eyes: Negative for photophobia and visual disturbance.   Respiratory: Negative for cough, chest tightness, shortness of breath and wheezing.    Cardiovascular: Negative for chest pain and palpitations.   Gastrointestinal: Negative for constipation, diarrhea, nausea and vomiting.   Endocrine: Negative for cold intolerance and heat intolerance.   Genitourinary: Negative for flank pain.   Musculoskeletal: Positive for gait problem. Negative for neck pain and neck stiffness.   Skin: Negative for wound.   Neurological: Negative for light-headedness, numbness and headaches.   Psychiatric/Behavioral: Negative for sleep disturbance.         Objective:   /87 (BP Location: Right arm, Patient Position: Sitting)   Pulse 105   Ht 5' 3" (1.6 m)   Wt 93.5 kg (206 lb 2.1 oz)   BMI 36.51 kg/m²       LOWER EXTREMITY PHYSICAL EXAMINATION    VASCULAR: On the right and left foot, the dorsalis pedis pulse is 2/4 and the posterior tibial pulse is " 2/4. Capillary refill time is less than 3 seconds. Hair growth is sparse, but present on the dorsum of the foot and at the digits. Proximal to distal temperature is warm to warm.    ORTHOPEDIC (right and left):  Mild bunion deformity is noted to the right foot. Upon ROM in the sagittal plan, there is mild pain related at the end ROM, dorsally; no plantar pains at the 1st MTPJ are stated. No pains to palpation of the tibial or the fibular sesamoid. There is a moderately sized medial eminence appreciated. There is minimal dorsal spurring noted. Palpation of the dorsal-lateral aspect of the 1st MTPJ is painful. Hallux abductus is noted.  Hallux interphalangeus is not noted. There is tracking. The hallux is not trackbound. There is no hypermobility noted at the 1st TMTJ. Upon reduction of the IM angle at the 1st metatarsal head, the hallux is not rectus.     DERMATOLOGY: Skin is supple, dry and intact. No ecchymosis is noted. No ulcerations are noted. Skin is supple and moist.     NEUROLOGY: Protective sensation is intact via 5.07 Prentice Alejo monofilament. Proprioception is intact. Sensation to light touch is intact.     Assessment:     1. Hallux valgus (acquired), right foot    2. Hallux valgus (acquired), left foot    3. Pain in right foot    4. Pain in left foot    5. Controlled type 2 diabetes mellitus without complication, without long-term current use of insulin    6. Severe obesity (BMI 35.0-39.9) with comorbidity    7. Vitamin D deficiency    8. Hallux limitus, acquired, left          Plan:     Hallux valgus (acquired), right foot  Pain in right foot  XRAYS are reviewed in detail with the patient. All questions and concerns regarding findings and its/their implications are outlined and discussed.    Rec. shoe gear with soft and accommodative foot bed and with a high toe box for alleviation of symptoms. Possible EE or EEE in width as well for alleviation of symptoms.    NSAIDs or Tylenol as needed.    Hallux  valgus (acquired), left foot  Pain in left foot  Hallux limitus, left  XRAYS are reviewed in detail with the patient. All questions and concerns regarding findings and its/their implications are outlined and discussed.    Rec. shoe gear with soft and accommodative foot bed and with a high toe box for alleviation of symptoms. Possible EE or EEE in width as well for alleviation of symptoms.    The procedure of (left bunionectomy ([place] vs. [place] (for decompression)) was thoroughly explained to the patient. Its necessity was outlined, including its necessity, implications, advantages and/or disadvantages, and possible complications, if any. Possible complications include recurrence of pathology and/or deformity, infection (cellulitis, drainage, purulence, malodor, etc...), pain, numbness, neuritis, edema, burning, loss of function, need for further surgery, possible need for removal of any implanted hardware, soft tissue contracture and/or scarring, etc... No guarantees were given and/or implied. Post-operative expectations and weightbearing protocol is thoroughly explained the patient, who acknowledges understanding. The patient acknowledges understanding of all the aforementioned, and does sign a consent form, that is witnessed. Preoperative labs and/or EKG and/or XRay Chest or other pertinent imaging, as well as medical clearances are to be reviewed and read over by myself.      Controlled type 2 diabetes mellitus without complication, without long-term current use of insulin  I counseled the patient on his/her Diabetic Mellitus regarding today's clinical examination and objection findings. We did also discuss recent medication changes, pertinent labs and imaging evaluations and other medical consultation notes and progress notes. Greater than 50% of this visit was spent on counseling and coordination of care. Greater than 20 minutes of this appt. was spent on education about the diabetic foot, in relation to  PVD and/or neuropathy, and the prevention of limb loss.     Shoe gear is inspected and wear and proper fit/type. Patient is reminded of the importance of good nutrition and blood sugar control to help prevent podiatric complications of diabetes. Patient instructed on proper foot hygeine. We discussed wearing proper shoe gear, daily foot inspections, never walking without protective shoe gear, never putting sharp instruments to feet.  Patient  will continue to monitor the areas daily, inspect feet, wear protective shoe gear when ambulatory, moisturizer to maintain skin integrity.     Patient's DMI/DMII is managed by Primary Care Provider and/or Endocrinology Advanced Practice Provider. As per the most recent glycohemoglobin level, this patient is at goal.    Severe obesity (BMI 35.0-39.9) with comorbidity  Patient is counseled and reminded concerning the importance of good nutrition and healthy eating habits, which may include blood sugar control, to prevent and/or help podiatric foot and ankle complications.    Surgical Consent Information  Procedure Date: Friday, May 10th, 2019.    Treatment/Procedure: Bunionectomy, left foot.    Sedation: Moderate: Local MAC.    Patient Condition/Indication for Procedure:  To alleviate pain and deformity.    Surgical Coding  Diagnosis: Hallux valgus (acquired), left foot  CPT: 47800          Future Appointments   Date Time Provider Department Center   5/2/2019 11:00 AM [place] Zhang [place] [facility] [place] [facility] [place]   5/27/2019 11:30 AM Srinivasan Villanueva DPM [facility] POD  [facility]   5/28/2019 11:45 AM Srinivasan Villanueva DPM [facility] POD  [facility]   7/10/2019  1:45 PM [facility]2 [facility] [place]   7/10/2019  2:00 PM [facility]ULMONARY LAB, [facility] [place]   7/10/2019  2:20 PM Nixon Mcdermott MD [facility] [place]

## 2019-04-30 ENCOUNTER — PATIENT MESSAGE (OUTPATIENT)
Dept: INTERNAL MEDICINE | Facility: CLINIC | Age: 69
End: 2019-04-30

## 2019-04-30 ENCOUNTER — PATIENT MESSAGE (OUTPATIENT)
Dept: SURGERY | Facility: HOSPITAL | Age: 69
End: 2019-04-30

## 2019-04-30 ENCOUNTER — PATIENT MESSAGE (OUTPATIENT)
Dept: CARDIOLOGY | Facility: CLINIC | Age: 69
End: 2019-04-30

## 2019-04-30 DIAGNOSIS — E55.9 VITAMIN D DEFICIENCY: Primary | ICD-10-CM

## 2019-04-30 RX ORDER — ERGOCALCIFEROL 1.25 MG/1
50000 CAPSULE ORAL
Qty: 8 CAPSULE | Refills: 0 | Status: SHIPPED | OUTPATIENT
Start: 2019-04-30 | End: 2019-05-21 | Stop reason: SDUPTHER

## 2019-05-01 ENCOUNTER — TELEPHONE (OUTPATIENT)
Dept: PODIATRY | Facility: CLINIC | Age: 69
End: 2019-05-01

## 2019-05-01 NOTE — TELEPHONE ENCOUNTER
Spoke with pt about med, pt do understand that she needs to take Vit.D 1 tab weeky... Please advise    Ivonne Orr MA  Podiatry Surgical Department

## 2019-05-07 NOTE — DISCHARGE INSTRUCTIONS
Weightbearing Status and Wound care:  1. When walking, wear the surgical shoe at all times.  2. When walking with the surgical shoe, place the majority of the weight on the lateral of the foot (towards the 5th toe) and the back of the foot (towards the heel).  3. Ice the anterior (front) of the ankle or the posterior (behind) the knee, every 15-20 minutes per hour for the initial 3-4 days, then at east 30 minutes per hour thereafter.  4. Elevate the extremity above the level of the heart at all times when sitting.  5. Take the pain mediations as prescribed for the initial 3 or so days, then only as needed.  6. If no overt contra-indication, take Motrin or Advil or Ibuprofen or Aleve in between the pain medication doses.  7. Do not change the dressings.  8. Do not get the dressings wet.

## 2019-05-08 ENCOUNTER — OFFICE VISIT (OUTPATIENT)
Dept: INTERNAL MEDICINE | Facility: CLINIC | Age: 69
End: 2019-05-08
Payer: MEDICARE

## 2019-05-08 VITALS
HEART RATE: 97 BPM | OXYGEN SATURATION: 93 % | DIASTOLIC BLOOD PRESSURE: 62 MMHG | BODY MASS INDEX: 36.5 KG/M2 | TEMPERATURE: 98 F | HEIGHT: 63 IN | SYSTOLIC BLOOD PRESSURE: 104 MMHG | WEIGHT: 206 LBS

## 2019-05-08 DIAGNOSIS — I47.10 PAROXYSMAL SVT (SUPRAVENTRICULAR TACHYCARDIA): ICD-10-CM

## 2019-05-08 DIAGNOSIS — I15.9 SECONDARY HYPERTENSION: ICD-10-CM

## 2019-05-08 DIAGNOSIS — E11.69 HYPERLIPIDEMIA ASSOCIATED WITH TYPE 2 DIABETES MELLITUS: ICD-10-CM

## 2019-05-08 DIAGNOSIS — Z01.818 PREOPERATIVE CLEARANCE: Primary | ICD-10-CM

## 2019-05-08 DIAGNOSIS — E11.9 CONTROLLED TYPE 2 DIABETES MELLITUS WITHOUT COMPLICATION, WITHOUT LONG-TERM CURRENT USE OF INSULIN: ICD-10-CM

## 2019-05-08 DIAGNOSIS — G47.00 INSOMNIA, UNSPECIFIED TYPE: ICD-10-CM

## 2019-05-08 DIAGNOSIS — E78.5 HYPERLIPIDEMIA ASSOCIATED WITH TYPE 2 DIABETES MELLITUS: ICD-10-CM

## 2019-05-08 DIAGNOSIS — K21.9 GASTROESOPHAGEAL REFLUX DISEASE, ESOPHAGITIS PRESENCE NOT SPECIFIED: ICD-10-CM

## 2019-05-08 PROBLEM — J11.1 FLU: Status: RESOLVED | Noted: 2019-03-06 | Resolved: 2019-05-08

## 2019-05-08 PROBLEM — M70.21 OLECRANON BURSITIS OF RIGHT ELBOW: Status: RESOLVED | Noted: 2018-06-06 | Resolved: 2019-05-08

## 2019-05-08 PROBLEM — R05.9 COUGH: Status: RESOLVED | Noted: 2019-03-06 | Resolved: 2019-05-08

## 2019-05-08 PROCEDURE — 3074F PR MOST RECENT SYSTOLIC BLOOD PRESSURE < 130 MM HG: ICD-10-PCS | Mod: HCNC,CPTII,S$GLB, | Performed by: FAMILY MEDICINE

## 2019-05-08 PROCEDURE — 3044F HG A1C LEVEL LT 7.0%: CPT | Mod: HCNC,CPTII,S$GLB, | Performed by: FAMILY MEDICINE

## 2019-05-08 PROCEDURE — 3078F PR MOST RECENT DIASTOLIC BLOOD PRESSURE < 80 MM HG: ICD-10-PCS | Mod: HCNC,CPTII,S$GLB, | Performed by: FAMILY MEDICINE

## 2019-05-08 PROCEDURE — 99999 PR PBB SHADOW E&M-EST. PATIENT-LVL III: CPT | Mod: PBBFAC,HCNC,, | Performed by: FAMILY MEDICINE

## 2019-05-08 PROCEDURE — 1101F PR PT FALLS ASSESS DOC 0-1 FALLS W/OUT INJ PAST YR: ICD-10-PCS | Mod: HCNC,CPTII,S$GLB, | Performed by: FAMILY MEDICINE

## 2019-05-08 PROCEDURE — 1101F PT FALLS ASSESS-DOCD LE1/YR: CPT | Mod: HCNC,CPTII,S$GLB, | Performed by: FAMILY MEDICINE

## 2019-05-08 PROCEDURE — 3074F SYST BP LT 130 MM HG: CPT | Mod: HCNC,CPTII,S$GLB, | Performed by: FAMILY MEDICINE

## 2019-05-08 PROCEDURE — 3078F DIAST BP <80 MM HG: CPT | Mod: HCNC,CPTII,S$GLB, | Performed by: FAMILY MEDICINE

## 2019-05-08 PROCEDURE — 3044F PR MOST RECENT HEMOGLOBIN A1C LEVEL <7.0%: ICD-10-PCS | Mod: HCNC,CPTII,S$GLB, | Performed by: FAMILY MEDICINE

## 2019-05-08 PROCEDURE — 99999 PR PBB SHADOW E&M-EST. PATIENT-LVL III: ICD-10-PCS | Mod: PBBFAC,HCNC,, | Performed by: FAMILY MEDICINE

## 2019-05-08 PROCEDURE — 99214 PR OFFICE/OUTPT VISIT, EST, LEVL IV, 30-39 MIN: ICD-10-PCS | Mod: HCNC,S$GLB,, | Performed by: FAMILY MEDICINE

## 2019-05-08 PROCEDURE — 99214 OFFICE O/P EST MOD 30 MIN: CPT | Mod: HCNC,S$GLB,, | Performed by: FAMILY MEDICINE

## 2019-05-08 RX ORDER — OMEPRAZOLE 20 MG/1
20 CAPSULE, DELAYED RELEASE ORAL DAILY
Qty: 90 CAPSULE | Refills: 3 | Status: SHIPPED | OUTPATIENT
Start: 2019-05-08 | End: 2020-05-18

## 2019-05-08 RX ORDER — METOPROLOL SUCCINATE 50 MG/1
50 TABLET, EXTENDED RELEASE ORAL DAILY
Qty: 90 TABLET | Refills: 3 | Status: SHIPPED | OUTPATIENT
Start: 2019-05-08 | End: 2020-05-18

## 2019-05-08 NOTE — H&P (VIEW-ONLY)
Subjective:       Patient ID: Cassandra Campos is a 68 y.o. female.    Chief Complaint: Pre-op Exam    Preop clearance for bunionectomy.  Medical history includes hypertension paroxysmal supraventricular tachycardia hyperlipidemia diabetes mellitus vitamin-D deficiency.  She denies headache chest pain palpitations shortness of breath or edema.    Review of Systems   Constitutional: Negative for activity change, appetite change, chills, diaphoresis, fatigue, fever and unexpected weight change.   HENT: Negative for congestion.    Respiratory: Negative for cough, chest tightness, shortness of breath and wheezing.    Cardiovascular: Negative for chest pain, palpitations and leg swelling.        Denies lightheadedness   Gastrointestinal: Negative for abdominal distention, abdominal pain, blood in stool, constipation, diarrhea, nausea and vomiting.        Reflux controlled with Prilosec   Genitourinary: Negative for difficulty urinating, dysuria, frequency and urgency.   Musculoskeletal: Positive for joint swelling.        Bilateral bunions 1st MTP joints   Skin: Negative for rash and wound.   Neurological: Negative for dizziness, tremors, syncope, weakness, light-headedness, numbness and headaches.   Hematological: Negative for adenopathy. Does not bruise/bleed easily.   Psychiatric/Behavioral: Positive for sleep disturbance.        Lunesta is working well for sleep       Objective:      Physical Exam   Constitutional: She is oriented to person, place, and time. She appears well-developed and well-nourished. No distress.   HENT:   Right Ear: External ear normal.   Left Ear: External ear normal.   Nose: Nose normal.   Mouth/Throat: Oropharynx is clear and moist.   Eyes: Pupils are equal, round, and reactive to light.   Neck: Neck supple. No JVD present. No thyromegaly present.   Cardiovascular: Normal rate, regular rhythm and normal heart sounds.   No murmur heard.  Pulmonary/Chest: Effort normal and breath sounds  normal. No respiratory distress. She has no wheezes.   Abdominal: Soft. Bowel sounds are normal. She exhibits no mass. There is no tenderness.   Musculoskeletal:   Bilateral bunions   Lymphadenopathy:     She has no cervical adenopathy.   Neurological: She is alert and oriented to person, place, and time. She exhibits normal muscle tone. Coordination normal.   Skin: Skin is warm and dry. She is not diaphoretic.       Lab Visit on 04/29/2019   Component Date Value Ref Range Status    Vit D, 25-Hydroxy 04/29/2019 21* 30 - 96 ng/mL Final     Assessment:       1. Secondary hypertension    2. Insomnia, unspecified type        Plan:     Blood pressure controlled 01/04/2062.  CBC CMP vitamin-D were reviewed.  Vitamin-D level is low which she recently started vitamin-D replacement.  Recent EKG was normal Refill metoprolol and Prilosec.  She is cleared for surgery.    Secondary hypertension  -     metoprolol succinate (TOPROL-XL) 50 MG 24 hr tablet; Take 1 tablet (50 mg total) by mouth once daily.  Dispense: 90 tablet; Refill: 3    Insomnia, unspecified type    Other orders  -     omeprazole (PRILOSEC) 20 MG capsule; Take 1 capsule (20 mg total) by mouth once daily.  Dispense: 90 capsule; Refill: 3

## 2019-05-08 NOTE — PRE ADMISSION SCREENING
Pre op instructions reviewed with patient per phone:    To confirm, Your surgeon has instructed you:  Surgery is scheduled 05/10/2019 at 12:40 PM.      Please report to [facility] 1st floor main lobby by 11:10 AM.   Pre admit office to call afternoon prior to surgery with final arrival time      INSTRUCTIONS IMPORTANT!!!  ¨ Do not eat, drink, or smoke after 12 midnight-including water. OK to brush teeth, no gum, candy or mints!    ¨ Take only these medicines with a small swallow of water-morning of surgery.  N/A  ____  Do not wear makeup, including mascara.  ____  No powder, lotions or creams to surgical area.  ____  Please remove all jewelry, including piercings and leave at home.  ____  No money or valuables needed. Please leave at home.  ____  Please bring identification and insurance information to hospital.  ____  If going home the same day, arrange for a ride home. You will not be able to   drive if Anesthesia was used.  ____  Children, under 12 years old, must remain in the waiting room with an adult.  They are not allowed in patient areas.  ____  Wear loose fitting clothing. Allow for dressings, bandages.  ____  Stop Aspirin, Ibuprofen, Motrin and Aleve at least 5-7 days before surgery, unless otherwise instructed by your doctor, or the nurse.   You MAY use Tylenol/acetaminophen until day of surgery.  ____  If you take diabetic medication, do not take am of surgery unless instructed by   Doctor.  ____ Stop taking any Fish Oil supplement or any Vitamins that contain Vitamin E at least 5 days prior to surgery.          Bathing Instructions-- The night before surgery and the morning prior to coming to the hospital:   -Do not shave the surgical area.   -Shower and wash your hair and body as usual with anti-bacterial  soap and shampoo.   -Rinse your hair and body completely.   -Use one packet of hibiclens to wash the surgical site (using your hand) gently for 5 minutes.  Do not scrub you  skin too hard.   -Do not use hibiclens on your head, face, or genitals.   -Do not wash with anti-bacterial soap after you use the hibiclens.   -Rinse your body thoroughly.   -Dry with clean, soft towel.  Do not use lotion, cream, deodorant, or powders on   the surgical site.    Use antibacterial soap in place of hibiclens if your surgery is on the head, face or genitals.         Surgical Site Infection    Prevention of surgical site infections:     -Keep incisions clean and dry.   -Do not soak/submerge incisions in water until completely healed.   -Do not apply lotions, powders, creams, or deodorants to site.   -Always make sure hands are cleaned with antibacterial soap/ alcohol-based   prior to touching the surgical site.  (This includes doctors, nurses, staff, and yourself.)    Signs and symptoms:   -Redness and pain around the area where you had surgery   -Drainage of cloudy fluid from your surgical wound   -Fever over 100.4  I have read or had read and explained to me, and understand the above information.

## 2019-05-10 ENCOUNTER — ANESTHESIA EVENT (OUTPATIENT)
Dept: SURGERY | Facility: HOSPITAL | Age: 69
End: 2019-05-10
Payer: MEDICARE

## 2019-05-10 ENCOUNTER — HOSPITAL ENCOUNTER (OUTPATIENT)
Facility: HOSPITAL | Age: 69
Discharge: HOME | End: 2019-05-10
Attending: PODIATRIST | Admitting: PODIATRIST
Payer: MEDICARE

## 2019-05-10 ENCOUNTER — NURSE TRIAGE (OUTPATIENT)
Dept: ADMINISTRATIVE | Facility: CLINIC | Age: 69
End: 2019-05-10

## 2019-05-10 ENCOUNTER — ANESTHESIA (OUTPATIENT)
Dept: SURGERY | Facility: HOSPITAL | Age: 69
End: 2019-05-10
Payer: MEDICARE

## 2019-05-10 DIAGNOSIS — M21.612 BUNION, LEFT FOOT: ICD-10-CM

## 2019-05-10 LAB
POCT GLUCOSE: 110 MG/DL (ref 70–110)
POCT GLUCOSE: 125 MG/DL (ref 70–110)

## 2019-05-10 PROCEDURE — 37000008 HC ANESTHESIA 1ST 15 MINUTES: Mod: HCNC | Performed by: PODIATRIST

## 2019-05-10 PROCEDURE — 27201423 OPTIME MED/SURG SUP & DEVICES STERILE SUPPLY: Mod: HCNC | Performed by: PODIATRIST

## 2019-05-10 PROCEDURE — 28299 PR CORRECT BUNION,DOUBLE OSTEOTOMY: ICD-10-PCS | Mod: TA,HCNC,, | Performed by: PODIATRIST

## 2019-05-10 PROCEDURE — 37000009 HC ANESTHESIA EA ADD 15 MINS: Mod: HCNC | Performed by: PODIATRIST

## 2019-05-10 PROCEDURE — C1713 ANCHOR/SCREW BN/BN,TIS/BN: HCPCS | Mod: HCNC | Performed by: PODIATRIST

## 2019-05-10 PROCEDURE — 25000003 PHARM REV CODE 250: Mod: HCNC | Performed by: NURSE ANESTHETIST, CERTIFIED REGISTERED

## 2019-05-10 PROCEDURE — 25000003 PHARM REV CODE 250: Mod: HCNC | Performed by: PODIATRIST

## 2019-05-10 PROCEDURE — 36000707: Mod: HCNC | Performed by: PODIATRIST

## 2019-05-10 PROCEDURE — 28299 COR HLX VLGS DOUBLE OSTEOT: CPT | Mod: TA,HCNC,, | Performed by: PODIATRIST

## 2019-05-10 PROCEDURE — C1769 GUIDE WIRE: HCPCS | Mod: HCNC | Performed by: PODIATRIST

## 2019-05-10 PROCEDURE — 36000706: Mod: HCNC | Performed by: PODIATRIST

## 2019-05-10 PROCEDURE — 63600175 PHARM REV CODE 636 W HCPCS: Mod: HCNC | Performed by: NURSE ANESTHETIST, CERTIFIED REGISTERED

## 2019-05-10 PROCEDURE — S0020 INJECTION, BUPIVICAINE HYDRO: HCPCS | Mod: HCNC | Performed by: PODIATRIST

## 2019-05-10 PROCEDURE — 25000003 PHARM REV CODE 250: Mod: HCNC | Performed by: ANESTHESIOLOGY

## 2019-05-10 PROCEDURE — 71000033 HC RECOVERY, INTIAL HOUR: Mod: HCNC | Performed by: PODIATRIST

## 2019-05-10 PROCEDURE — 71000015 HC POSTOP RECOV 1ST HR: Mod: HCNC | Performed by: PODIATRIST

## 2019-05-10 DEVICE — IMPLANTABLE DEVICE: Type: IMPLANTABLE DEVICE | Site: FOOT | Status: FUNCTIONAL

## 2019-05-10 RX ORDER — SODIUM CHLORIDE 0.9 % (FLUSH) 0.9 %
10 SYRINGE (ML) INJECTION
Status: DISCONTINUED | OUTPATIENT
Start: 2019-05-10 | End: 2019-05-10 | Stop reason: HOSPADM

## 2019-05-10 RX ORDER — OXYCODONE HYDROCHLORIDE 5 MG/1
5 TABLET ORAL
Status: DISCONTINUED | OUTPATIENT
Start: 2019-05-10 | End: 2019-05-10 | Stop reason: HOSPADM

## 2019-05-10 RX ORDER — PROPOFOL 10 MG/ML
VIAL (ML) INTRAVENOUS
Status: DISCONTINUED | OUTPATIENT
Start: 2019-05-10 | End: 2019-05-10

## 2019-05-10 RX ORDER — LIDOCAINE HYDROCHLORIDE 10 MG/ML
INJECTION INFILTRATION; PERINEURAL
Status: DISCONTINUED | OUTPATIENT
Start: 2019-05-10 | End: 2019-05-10

## 2019-05-10 RX ORDER — OXYCODONE AND ACETAMINOPHEN 7.5; 325 MG/1; MG/1
1 TABLET ORAL EVERY 6 HOURS PRN
Qty: 28 TABLET | Refills: 0 | Status: SHIPPED | OUTPATIENT
Start: 2019-05-10 | End: 2019-05-17

## 2019-05-10 RX ORDER — FENTANYL CITRATE 50 UG/ML
INJECTION, SOLUTION INTRAMUSCULAR; INTRAVENOUS
Status: DISCONTINUED | OUTPATIENT
Start: 2019-05-10 | End: 2019-05-10

## 2019-05-10 RX ORDER — CEFAZOLIN SODIUM 2 G/50ML
2 SOLUTION INTRAVENOUS
Status: DISPENSED | OUTPATIENT
Start: 2019-05-10 | End: 2019-05-10

## 2019-05-10 RX ORDER — HYDROMORPHONE HYDROCHLORIDE 2 MG/ML
0.2 INJECTION, SOLUTION INTRAMUSCULAR; INTRAVENOUS; SUBCUTANEOUS EVERY 5 MIN PRN
Status: DISCONTINUED | OUTPATIENT
Start: 2019-05-10 | End: 2019-05-10 | Stop reason: HOSPADM

## 2019-05-10 RX ORDER — ONDANSETRON 2 MG/ML
4 INJECTION INTRAMUSCULAR; INTRAVENOUS DAILY PRN
Status: DISCONTINUED | OUTPATIENT
Start: 2019-05-10 | End: 2019-05-10 | Stop reason: HOSPADM

## 2019-05-10 RX ORDER — CEFAZOLIN SODIUM 1 G/3ML
INJECTION, POWDER, FOR SOLUTION INTRAMUSCULAR; INTRAVENOUS
Status: DISCONTINUED | OUTPATIENT
Start: 2019-05-10 | End: 2019-05-10

## 2019-05-10 RX ORDER — MEPERIDINE HYDROCHLORIDE 50 MG/ML
12.5 INJECTION INTRAMUSCULAR; INTRAVENOUS; SUBCUTANEOUS EVERY 10 MIN PRN
Status: DISCONTINUED | OUTPATIENT
Start: 2019-05-10 | End: 2019-05-10 | Stop reason: HOSPADM

## 2019-05-10 RX ORDER — BUPIVACAINE HYDROCHLORIDE 5 MG/ML
INJECTION, SOLUTION EPIDURAL; INTRACAUDAL
Status: DISCONTINUED | OUTPATIENT
Start: 2019-05-10 | End: 2019-05-10 | Stop reason: HOSPADM

## 2019-05-10 RX ORDER — MIDAZOLAM HYDROCHLORIDE 1 MG/ML
INJECTION, SOLUTION INTRAMUSCULAR; INTRAVENOUS
Status: DISCONTINUED | OUTPATIENT
Start: 2019-05-10 | End: 2019-05-10

## 2019-05-10 RX ORDER — FENTANYL CITRATE 50 UG/ML
25 INJECTION, SOLUTION INTRAMUSCULAR; INTRAVENOUS EVERY 5 MIN PRN
Status: DISCONTINUED | OUTPATIENT
Start: 2019-05-10 | End: 2019-05-10 | Stop reason: HOSPADM

## 2019-05-10 RX ORDER — CEFADROXIL 500 MG/1
500 CAPSULE ORAL EVERY 12 HOURS
Qty: 10 CAPSULE | Refills: 0 | Status: SHIPPED | OUTPATIENT
Start: 2019-05-10 | End: 2019-05-15

## 2019-05-10 RX ORDER — SODIUM CHLORIDE, SODIUM LACTATE, POTASSIUM CHLORIDE, CALCIUM CHLORIDE 600; 310; 30; 20 MG/100ML; MG/100ML; MG/100ML; MG/100ML
INJECTION, SOLUTION INTRAVENOUS CONTINUOUS PRN
Status: DISCONTINUED | OUTPATIENT
Start: 2019-05-10 | End: 2019-05-10

## 2019-05-10 RX ADMIN — PROPOFOL 40 MG: 10 INJECTION, EMULSION INTRAVENOUS at 01:05

## 2019-05-10 RX ADMIN — CEFAZOLIN 2 G: 1 INJECTION, POWDER, FOR SOLUTION INTRAMUSCULAR; INTRAVENOUS at 01:05

## 2019-05-10 RX ADMIN — PROPOFOL 30 MG: 10 INJECTION, EMULSION INTRAVENOUS at 02:05

## 2019-05-10 RX ADMIN — MIDAZOLAM 2 MG: 1 INJECTION INTRAMUSCULAR; INTRAVENOUS at 01:05

## 2019-05-10 RX ADMIN — OXYCODONE HYDROCHLORIDE 5 MG: 5 TABLET ORAL at 03:05

## 2019-05-10 RX ADMIN — PROPOFOL 40 MG: 10 INJECTION, EMULSION INTRAVENOUS at 02:05

## 2019-05-10 RX ADMIN — SODIUM CHLORIDE, SODIUM LACTATE, POTASSIUM CHLORIDE, AND CALCIUM CHLORIDE: 600; 310; 30; 20 INJECTION, SOLUTION INTRAVENOUS at 01:05

## 2019-05-10 RX ADMIN — LIDOCAINE HYDROCHLORIDE 30 MG: 10 INJECTION, SOLUTION INFILTRATION; PERINEURAL at 01:05

## 2019-05-10 RX ADMIN — FENTANYL CITRATE 100 MCG: 50 INJECTION, SOLUTION INTRAMUSCULAR; INTRAVENOUS at 01:05

## 2019-05-10 NOTE — OP NOTE
[facility]  Podiatric Medicine & Surgery  Operative Report    SUMMARY     Date of Procedure: 5/10/2019    Procedure: Procedure(s):  BUNIONECTOMY, WITH METATARSAL OSTEOTOMY    Surgeon(s) and Role: Surgeon(s) and Role:     * Srinivasan Villanueva DPM - Primary    Pre-Operative Diagnosis: Pre-Op Diagnosis Codes:     * Hallux valgus (acquired), left foot [M20.12]     * Pain in left foot [M79.672]    Post-Operative Diagnosis: Post-Op Diagnosis Codes:     * Hallux valgus (acquired), left foot [M20.12]     * Pain in left foot [M79.672]    Anesthesia: Local MAC    Technical Procedures Used:   1. Nhan bunionectomy, left foot.   2. AKIN phalangeal osteotomy, left foot    Description of the Findings of the Procedure: The patient was seen in the Holding Room. The risks, benefits, complications, treatment options, and expected outcomes were discussed with the patient. The risks and potential complications of their problem and purposed treatment include but are not limited to infection, nerve injury, vascular injury, nonunion/malunion/delayed union of the surgical site, persistent pain, potential skin necrosis, deep vein thrombosis, possible pulmonary embolus, complications of the anesthetics and failure of the implant.  The patient concurred with the proposed plan, giving informed consent. The patient is aware that the procedure may be a part of a staged collection of procedures for definitive cure and/or alleviation of symptoms. The site of surgery properly noted/marked. Preoperative intravenous antibiotics are hanging at bedside, and are currently being administered via the heparin lock. The patient was taken to Operating Suite.    The patient is wheeled back to the operative suite on a gurney/stretcher/hospital bed in the supine position. Once in the operative suite, the patient is transferred onto the operative table in the supine position. A TIME-OUT is taken as per protocol to identify the proper  patient, procedure to be performed, and laterality.  The patient is properly positioned on the operating room table for ease of dissection and for any ancillary imaging.  A well-padded tourniquet was applied to the left ankle.  Nursing and ancillary OR staff prepared the patient for the procedure. The patient is adequately sedated by the Attending Anesthesiologist and/or covering CRNA.  Following this, a preoperative regional/local block was given to the proximal first ray in Bazzi block fashion.  Next, the operative limb was rendered sterile using chlorhexidine paint and scrub.  Sterile sheets and drapes were applied thereafter. Another TIME-OUT is taken as per protocol to identify the proper patient, procedure to be performed, and laterality.      The limb is elevated and is exsanguinated with an Esmarch bandage for approximately 2 min.  The ankle tourniquet was inflated to 250mmHg.  Dorsal medial skin incision about the 1st metatarsophalangeal joint with a sharp scalpel blade. Deep dissection with large curved Metzenbaum scissors.  The extensor hallucis longus tendon is retracted laterally.  The capsule was entered with a sharp scalpel blade. The capsule was freed from the metatarsal head as well as the base of the proximal phalanx with a sharp scalpel.  Of note, there are/is OCD lesions of the 1st met. head. These lesions are subchondral drilled with a 0.062 k-wire.    Following this, lateral release and a lateral capsulotomy is performed in standard fashion.  No adductor tendon transfer performed.  The sesamoids were free with a large McGlamry elevator.  Further soft tissue dissection at the metatarsal head.  The dorsal osteophytes of the metatarsal head are resected with a large bone rongeur.  The dorsal osteophytes to the proximal phalanx were resected in similar fashion.  The medial eminences resected in line with the diaphysis of the 1st metatarsal bone.    Following standard technique, a standard 60 degree  Nhan osteotomy was performed.  The capital fragment shifted laterally.  X-ray confirmed anatomical reduction and alignment of the sesamoids.  The osteotomy was fixated with 2 Arthrex FT compression screws; 3.5mm and 2.5mm.  Copious irrigation with sterile saline solution.    Following this, it is determined that an AKIN phalangeal osteotomy will be performed.  Following standard technique, the soft tissues at the medial border of the diaphysis of the proximal phalanx were resected with a sharp scalpel blade. An osteotomy is performed at the mid-shaft of the proximal phalanx in a medial to lateral direction, be mindful not to sever the lateral cortex.  Copious irrigation with sterile saline solution.  This osteotomy was fixated using an Arthrex 10mm staple.     The medial soft tissues of the 1st metatarsophalangeal joint are plicated and tightened with 2-0 Vicryl suture. Copious irrigation with sterile saline solution.  Following this, deep closure of the wound using 2-0 Vicryl suture. Subcutaneous/Subcuticular closure using for 3-0 Vicryl. The skin is closed with 3-0 Prolene. A postop block consisting of approximately 15 cc of Marcaine 0.50% was given in a Abzzi block fashion.  The limb is dressed with Xeroform/Adaptic nonadherent, followed by sterile 4 x 4 gauze, a light Vic and an Ace bandage for compression.  The ankle tourniquet deflated and capillary refill time is within normal limits x5.    The anesthesia is weaned. There were no complications to this procedure. Any final necessary imaging to be performed in the PACU if it was not performed here in the OR suite.  The patient is transferred to the [facility] bed/stretcher, [facility]. The patient is transferred to the PACU.    Significant Surgical Tasks Conducted by the Assistant(s), if Applicable: N/A    Complications: * No complications entered in OR log *    Estimated Blood Loss (EBL): Minimal    Implants:   Implant Name Type Inv. Item Serial No.   Lot No. LRB No. Used   DynaNite Nitinol Staple     [id number] Left 1   SCREW COMPRESION FT 2.5X20 - [id number]  SCREW COMPRESION FT 2.5X20  ARTHREX  Left 1   GUIDEWIRE .86MM TROCAR TIP - [id number]  GUIDEWIRE .86MM TROCAR TIP  ARTHREX  Left 1       Specimens: * No specimens in log *    Condition: stable    Disposition: PACU - hemodynamically stable.    Attestation: I performed the procedure.

## 2019-05-10 NOTE — INTERVAL H&P NOTE
The patient has been examined and the H&P has been reviewed:    I concur with the findings and no changes have occurred since H&P was written.    Anesthesia/Surgery risks, benefits and alternative options discussed and understood by patient/family.          Active Hospital Problems    Diagnosis  POA    Bunion, left foot [M21.612]  Yes      Resolved Hospital Problems   No resolved problems to display.

## 2019-05-10 NOTE — TRANSFER OF CARE
"Anesthesia Transfer of Care Note    Patient: Cassandra Campos    Procedure(s) Performed: Procedure(s) (LRB):  BUNIONECTOMY, WITH METATARSAL OSTEOTOMY (Left)    Patient location: PACU    Anesthesia Type: MAC    Transport from OR: Transported from OR on room air with adequate spontaneous ventilation    Post pain: adequate analgesia    Post assessment: no apparent anesthetic complications and tolerated procedure well    Post vital signs: stable    Level of consciousness: awake    Nausea/Vomiting: no nausea/vomiting    Complications: none    Transfer of care protocol was followed      Last vitals:   Visit Vitals  /77 (BP Location: Left arm, Patient Position: Sitting)   Pulse 94   Temp 36.2 °C (97.2 °F) (Temporal)   Resp 18   Ht 5' 3" (1.6 m)   Wt 93.3 kg (205 lb 11 oz)   SpO2 98%   Breastfeeding? No   BMI 36.44 kg/m²     "

## 2019-05-10 NOTE — ANESTHESIA POSTPROCEDURE EVALUATION
Anesthesia Post Evaluation    Patient: Cassandra Campos    Procedure(s) Performed: Procedure(s) (LRB):  BUNIONECTOMY, WITH METATARSAL OSTEOTOMY (Left)    Final Anesthesia Type: MAC  Patient location during evaluation: PACU  Patient participation: Yes- Able to Participate  Level of consciousness: awake  Post-procedure vital signs: reviewed and stable  Pain management: adequate  Airway patency: patent  PONV status at discharge: No PONV  Anesthetic complications: no      Cardiovascular status: blood pressure returned to baseline  Respiratory status: unassisted, spontaneous ventilation and room air  Hydration status: euvolemic  Follow-up not needed.          Vitals Value Taken Time   /68 5/10/2019  4:10 PM   Temp 36.7 °C (98.1 °F) 5/10/2019  4:10 PM   Pulse 100 5/10/2019  4:10 PM   Resp 18 5/10/2019  4:10 PM   SpO2 97 % 5/10/2019  4:10 PM         Event Time     Out of Recovery 15:48:00          Pain/Francheska Score: Pain Rating Prior to Med Admin: 5 (5/10/2019  3:17 PM)  Francheska Score: 10 (5/10/2019  4:00 PM)

## 2019-05-10 NOTE — BRIEF OP NOTE
[facility] -   Brief Operative Note     SUMMARY     Surgery Date: 5/10/2019     Surgeon(s) and Role:     * Srinivasan Villanueva DPM - Primary    Assisting Surgeon: None    Pre-op Diagnosis:  Hallux valgus (acquired), left foot [M20.12]  Pain in left foot [M79.672]    Post-op Diagnosis:  Post-Op Diagnosis Codes:     * Hallux valgus (acquired), left foot [M20.12]     * Pain in left foot [M79.672]    Procedure(s) (LRB):  BUNIONECTOMY, WITH METATARSAL OSTEOTOMY (Left)    Anesthesia: Local MAC    Description of the findings of the procedure:    1. Nhan bunionectomy, left foot.   2. AKIN phalangeal osteotomy, left foot    Findings/Key Components:  As per diagnosis    Estimated Blood Loss: * No values recorded between 5/10/2019  1:53 PM and 5/10/2019  2:47 PM *         Specimens:   Specimen (12h ago, onward)    None          Discharge Note    SUMMARY     Admit Date: 5/10/2019    Discharge Date and Time:  05/10/2019 2:48 PM    Hospital Course (synopsis of major diagnoses, care, treatment, and services provided during the course of the hospital stay):  Patient on a successful left foot, 1st ray bunionectomy.      Final Diagnosis: Post-Op Diagnosis Codes:     * Hallux valgus (acquired), left foot [M20.12]     * Pain in left foot [M79.672]    Disposition: Home or Self Care    Follow Up/Patient Instructions:     Medications:  Reconciled Home Medications:      Medication List      START taking these medications    cefadroxil 500 MG Cap  Commonly known as:  DURICEF  Take 1 capsule (500 mg total) by mouth every 12 (twelve) hours. for 5 days     oxyCODONE-acetaminophen 7.5-325 mg per tablet  Commonly known as:  PERCOCET  Take 1 tablet by mouth every 6 (six) hours as needed for Pain.        CHANGE how you take these medications    metFORMIN 500 MG 24 hr tablet  Commonly known as:  GLUCOPHAGE-XR  TAKE 2 TABLETS ONE TIME DAILY  What changed:    · how much to take  · how to take this  · when to take this        CONTINUE  taking these medications    ACCU-CHEK FASTCLIX LANCING DEV Misc  Generic drug:  lancets  USE ONE TIME DAILY     ACCU-CHEK SMARTVIEW TEST STRIP Strp  Generic drug:  blood sugar diagnostic  TEST ONE TIME DAILY     albuterol 90 mcg/actuation inhaler  Commonly known as:  PROVENTIL/VENTOLIN HFA  Inhale 2 puffs into the lungs every 4 to 6 hours as needed for Wheezing.     amitriptyline 100 MG tablet  Commonly known as:  ELAVIL  Take 1 tablet (100 mg total) by mouth nightly.     blood-glucose meter kit  Use as instructed     ciclopirox 8 % Soln  Commonly known as:  PENLAC  Apply to affected toenails at night time DAILY. On 7th day, file nails down, clean all nails with alcohol and restart application process.     ergocalciferol 50,000 unit Cap  Commonly known as:  ERGOCALCIFEROL  Take 1 capsule (50,000 Units total) by mouth every 7 days. for 8 doses     eszopiclone 3 mg Tab  Commonly known as:  LUNESTA  TAKE 1 TABLET EVERY NIGHT AS NEEDED     metoprolol succinate 50 MG 24 hr tablet  Commonly known as:  TOPROL-XL  Take 1 tablet (50 mg total) by mouth once daily.     omeprazole 20 MG capsule  Commonly known as:  PRILOSEC  Take 1 capsule (20 mg total) by mouth once daily.        STOP taking these medications    acetaminophen-codeine 300-15mg 300-15 mg per tablet  Commonly known as:  TYLENOL #2        ASK your doctor about these medications    pravastatin 40 MG tablet  Commonly known as:  PRAVACHOL  TAKE 1 TABLET BY MOUTH EVERY DAY          No discharge procedures on file.  Follow-up Information     Srinivasan Villanueva DPM On 5/28/2019.    Specialty:  Podiatry  Contact information:  [address] [place] [address] [address] [place] [id number] [phone]

## 2019-05-10 NOTE — ANESTHESIA PREPROCEDURE EVALUATION
05/10/2019  Cassandra Campos is a 68 y.o., female.    Anesthesia Evaluation    I have reviewed the Patient Summary Reports.    I have reviewed the Nursing Notes.   I have reviewed the Medications.     Review of Systems  Cardiovascular:   Hypertension Dysrhythmias (SVT) ECG has been reviewed.    Pulmonary:   Sleep Apnea    Hepatic/GI:   GERD    Musculoskeletal:   Lt foot hallux valgus   Neurological:   Headaches    Endocrine:   Diabetes           Anesthesia Plan  Type of Anesthesia, risks & benefits discussed:  Anesthesia Type:  MAC  Patient's Preference:   Intra-op Monitoring Plan: standard ASA monitors  Intra-op Monitoring Plan Comments:   Post Op Pain Control Plan: multimodal analgesia  Post Op Pain Control Plan Comments:   Induction:   IV  Beta Blocker:  Patient is on a Beta-Blocker and has received one dose within the past 24 hours (No further documentation required).       Informed Consent: Patient understands risks and agrees with Anesthesia plan.  Questions answered. Anesthesia consent signed with patient.  ASA Score: 3     Day of Surgery Review of History & Physical: I have interviewed and examined the patient. I have reviewed the patient's H&P dated:  There are no significant changes.          Ready For Surgery From Anesthesia Perspective.

## 2019-05-11 NOTE — TELEPHONE ENCOUNTER
"  Reason for Disposition   Health Information question, no triage required and triager able to answer question    Protocols used: INFORMATION ONLY CALL-A-    Patient calling asking if she has to wear boot while asleep. Advised patient according to discharge instructions she only needs to wear surgical shoe while walking at all times. "1. When walking, wear the surgical shoe at all times." patient voices understanding. Denies any further questions. Please contact caller directly to discuss any further care advice.  "

## 2019-05-13 VITALS
HEIGHT: 63 IN | TEMPERATURE: 98 F | WEIGHT: 205.69 LBS | HEART RATE: 100 BPM | DIASTOLIC BLOOD PRESSURE: 68 MMHG | RESPIRATION RATE: 18 BRPM | BODY MASS INDEX: 36.45 KG/M2 | SYSTOLIC BLOOD PRESSURE: 131 MMHG | OXYGEN SATURATION: 97 %

## 2019-05-20 ENCOUNTER — PATIENT MESSAGE (OUTPATIENT)
Dept: INTERNAL MEDICINE | Facility: CLINIC | Age: 69
End: 2019-05-20

## 2019-05-20 ENCOUNTER — PATIENT MESSAGE (OUTPATIENT)
Dept: PODIATRY | Facility: CLINIC | Age: 69
End: 2019-05-20

## 2019-05-20 DIAGNOSIS — M20.12 HALLUX VALGUS (ACQUIRED), LEFT FOOT: Primary | ICD-10-CM

## 2019-05-20 DIAGNOSIS — M79.672 PAIN IN LEFT FOOT: ICD-10-CM

## 2019-05-20 RX ORDER — OXYCODONE AND ACETAMINOPHEN 5; 325 MG/1; MG/1
1 TABLET ORAL EVERY 6 HOURS PRN
Qty: 28 TABLET | Refills: 0 | Status: SHIPPED | OUTPATIENT
Start: 2019-05-20 | End: 2019-05-27

## 2019-05-21 DIAGNOSIS — E55.9 VITAMIN D DEFICIENCY: ICD-10-CM

## 2019-05-21 RX ORDER — ERGOCALCIFEROL 1.25 MG/1
CAPSULE ORAL
Qty: 8 CAPSULE | Refills: 0 | Status: SHIPPED | OUTPATIENT
Start: 2019-05-21 | End: 2019-06-21 | Stop reason: ALTCHOICE

## 2019-05-28 ENCOUNTER — PATIENT MESSAGE (OUTPATIENT)
Dept: PODIATRY | Facility: CLINIC | Age: 69
End: 2019-05-28

## 2019-05-28 ENCOUNTER — OFFICE VISIT (OUTPATIENT)
Dept: PODIATRY | Facility: CLINIC | Age: 69
End: 2019-05-28
Payer: MEDICARE

## 2019-05-28 VITALS — RESPIRATION RATE: 17 BRPM | WEIGHT: 205 LBS | HEIGHT: 63 IN | BODY MASS INDEX: 36.32 KG/M2

## 2019-05-28 DIAGNOSIS — E55.9 VITAMIN D DEFICIENCY: ICD-10-CM

## 2019-05-28 DIAGNOSIS — E11.9 CONTROLLED TYPE 2 DIABETES MELLITUS WITHOUT COMPLICATION, WITHOUT LONG-TERM CURRENT USE OF INSULIN: ICD-10-CM

## 2019-05-28 DIAGNOSIS — M79.672 PAIN IN LEFT FOOT: ICD-10-CM

## 2019-05-28 DIAGNOSIS — T81.89XA DELAYED SURGICAL WOUND HEALING, INITIAL ENCOUNTER: ICD-10-CM

## 2019-05-28 DIAGNOSIS — E66.01 SEVERE OBESITY (BMI 35.0-39.9) WITH COMORBIDITY: ICD-10-CM

## 2019-05-28 DIAGNOSIS — M20.12 HALLUX VALGUS (ACQUIRED), LEFT FOOT: Primary | ICD-10-CM

## 2019-05-28 PROCEDURE — 99024 POSTOP FOLLOW-UP VISIT: CPT | Mod: HCNC,S$GLB,, | Performed by: PODIATRIST

## 2019-05-28 PROCEDURE — 99999 PR PBB SHADOW E&M-EST. PATIENT-LVL III: ICD-10-PCS | Mod: PBBFAC,HCNC,, | Performed by: PODIATRIST

## 2019-05-28 PROCEDURE — 99999 PR PBB SHADOW E&M-EST. PATIENT-LVL III: CPT | Mod: PBBFAC,HCNC,, | Performed by: PODIATRIST

## 2019-05-28 PROCEDURE — 99024 PR POST-OP FOLLOW-UP VISIT: ICD-10-PCS | Mod: HCNC,S$GLB,, | Performed by: PODIATRIST

## 2019-05-28 RX ORDER — SULFAMETHOXAZOLE AND TRIMETHOPRIM 800; 160 MG/1; MG/1
1 TABLET ORAL 2 TIMES DAILY
Qty: 14 TABLET | Refills: 0 | Status: SHIPPED | OUTPATIENT
Start: 2019-05-28 | End: 2019-06-04

## 2019-05-28 RX ORDER — OXYCODONE AND ACETAMINOPHEN 7.5; 325 MG/1; MG/1
1 TABLET ORAL EVERY 6 HOURS PRN
Qty: 20 TABLET | Refills: 0 | Status: SHIPPED | OUTPATIENT
Start: 2019-05-28 | End: 2019-06-02

## 2019-05-28 NOTE — PROGRESS NOTES
Subjective:       Patient ID: Cassandra Campos is a 68 y.o. female.    Chief Complaint: Foot Problem (Left foot post-op, pain 5/10, wear walking boot, ambualtion with out ephraim, diabetic, PCP Dr. Maldonado)      HPI:  Cassandra Campos presents to the office today, s/p 5/10/19 Nhan bunionectomy, left foot w/ AKIN phalangeal osteotomy, left foot. Patient presents weight-bearing as tolerated with a CAM Walker.  Patient denies local or systemic signs of infection.  Patient does admit to change the dressings once or twice since status post.  Patient denies local or systemic signs infection.  Patient does state swelling of the left lower extremity.  Patient does state RICE therapy.  Patient states her pains are typically 5/10 with walking and standing. Patient is a well controlled DMII without complications. Denia Maldonado MD is her primary care provider.    Hemoglobin A1C   Date Value Ref Range Status   02/19/2019 6.1 (H) 4.0 - 5.6 % Final     Comment:     ADA Screening Guidelines:  5.7-6.4%  Consistent with prediabetes  >or=6.5%  Consistent with diabetes  High levels of fetal hemoglobin interfere with the HbA1C  assay. Heterozygous hemoglobin variants (HbS, HgC, etc)do  not significantly interfere with this assay.   However, presence of multiple variants may affect accuracy.     08/14/2018 5.7 (H) 4.0 - 5.6 % Final     Comment:     ADA Screening Guidelines:  5.7-6.4%  Consistent with prediabetes  >or=6.5%  Consistent with diabetes  High levels of fetal hemoglobin interfere with the HbA1C  assay. Heterozygous hemoglobin variants (HbS, HgC, etc)do  not significantly interfere with this assay.   However, presence of multiple variants may affect accuracy.     02/12/2018 5.3 4.0 - 5.6 % Final     Comment:     According to ADA guidelines, hemoglobin A1c <7.0% represents  optimal control in non-pregnant diabetic patients. Different  metrics may apply to specific patient populations.   Standards of Medical Care in  Diabetes-2016.  For the purpose of screening for the presence of diabetes:  <5.7%     Consistent with the absence of diabetes  5.7-6.4%  Consistent with increasing risk for diabetes   (prediabetes)  >or=6.5%  Consistent with diabetes  Currently, no consensus exists for use of hemoglobin A1c  for diagnosis of diabetes for children.  This Hemoglobin A1c assay has significant interference with fetal   hemoglobin   (HbF). The results are invalid for patients with abnormal amounts of   HbF,   including those with known Hereditary Persistence   of Fetal Hemoglobin. Heterozygous hemoglobin variants (HbAS, HbAC,   HbAD, HbAE, HbA2) do not significantly interfere with this assay;   however, presence of multiple variants in a sample may impact the %   interference.           Review of patient's allergies indicates:  No Known Allergies    Past Medical History:   Diagnosis Date    Arthritis     hands    Borderline glaucoma     Gastritis     upper GI 2/2017    Hydradenitis     Hyperlipidemia     Hypertension     Insomnia     Migraines 02/01/2000    Morbid obesity due to excess calories 11/1/2016    Nasal septum perforation     Obesity     Pneumonia     Restrictive airway disease     Sleep apnea     SVT (supraventricular tachycardia) 09/2013    Type 2 diabetes mellitus     Type II or unspecified type diabetes mellitus without mention of complication, not stated as uncontrolled 05/20/2013       Family History   Problem Relation Age of Onset    Prostate cancer Brother     Diabetes Maternal Aunt        Social History     Socioeconomic History    Marital status:      Spouse name: Not on file    Number of children: 1    Years of education: Not on file    Highest education level: Not on file   Occupational History    Occupation:  aid   Social Needs    Financial resource strain: Not on file    Food insecurity:     Worry: Not on file     Inability: Not on file    Transportation needs:      Medical: Not on file     Non-medical: Not on file   Tobacco Use    Smoking status: Former Smoker     Packs/day: 0.50     Years: 30.00     Pack years: 15.00     Types: Cigarettes     Start date: 1970     Last attempt to quit: 2012     Years since quittin.4    Smokeless tobacco: Never Used   Substance and Sexual Activity    Alcohol use: Yes     Comment: rarely // wine  No alcohol 72 HOURS prior to surgery    Drug use: No    Sexual activity: Not Currently     Partners: Male   Lifestyle    Physical activity:     Days per week: Not on file     Minutes per session: Not on file    Stress: Not on file   Relationships    Social connections:     Talks on phone: Not on file     Gets together: Not on file     Attends Cheondoism service: Not on file     Active member of club or organization: Not on file     Attends meetings of clubs or organizations: Not on file     Relationship status: Not on file   Other Topics Concern    Not on file   Social History Narrative    Single, part-time teacher. Masters degree biology.        Past Surgical History:   Procedure Laterality Date    ARTHROGRAM Left 2016    Performed by [facility]ro [facility]za Sr[facility] at [facility] OR    ARTHROSCOPY-KNEE Right 2017    Per[facility] at [facility]    ARTHROSCOPY-KNEE W/ CHONDROPLASTY Right 2017    Performed [facility] at [facility] OR    ARTHROSCOPY-MENISCECTOMY Right 2017    Performed [facility] at [facility] OR    AXILLARY HIDRADENITIS EXCISION      BREAST BIOPSY Bilateral     BREAST LUMPECTOMY Bilateral 1998    Benign    BUNIONECTOMY, WITH METATARSAL OSTEOTOMY Left 5/10/2019    Performed by Srinivasan Villanueva DPM at [facility] OR    BURSECTOMY, OLECRANON Right 2018    Performed by aJyro [facility] Sr[facility] at [facility] OR    CARPAL TUNNEL RELEASE      bilateral    CATARACT EXTRACTION Bilateral     OU     SECTION  1979    CHOLECYSTECTOMY  2014    CYST REMOVAL  2015     "sebaceous cyst removed from face    ESOPHAGOGASTRODUODENOSCOPY (EGD) N/A 2/8/2017    [facility]formed [facility]ad [facility] at [facility] ENDO    EXCISION, EXOSTOSIS Right 7/25/2018    Performed by [facility] Sr[facility] at [facility] OR    gastric sleeve  02/13/2017    Dr. Watson    KNEE SURGERY Right     MICROFRACTURE Right 6/16/2017    Performed by [facility] Sr[facility] at [facility] OR    RELEASE-FINGER-TRIGGER Right 6/16/2017    Performed by Jayro Pedraza Sr., MD at [facility] OR    RELEASE-FINGER-TRIGGER Right 4/1/2015    Performed by Jayro Pedra[facility] Sr[facility] at [facility]    SYNOVECTOMY-KNEE Right 6/16/2017    Performed by Jayro [facility] Sr[facility]    TENOSYNOVECTOMY Right 6/16/2017    Performed by Jayro [facility] Sr[facility]    TENOSYNOVECTOMY Right 4/1/2015    Performed by Jayro Pedraza Sr., [facility] at [facility]    TONSILLECTOMY, ADENOIDECTOMY  1980s    TRIGGER FINGER RELEASE Right april 1, 2015    Dr. Pedraza    XI ROBOT ASSISTED LAPAROSCOPIC SLEEVE-GASTRECTOMY N/A 2/13/2017    Performed by Rashaad Watson MD at [facility] OR       Review of Systems   Constitutional: Negative for chills, fatigue and fever.   HENT: Negative for hearing loss.    Eyes: Negative for photophobia and visual disturbance.   Respiratory: Negative for cough, chest tightness, shortness of breath and wheezing.    Cardiovascular: Negative for chest pain and palpitations.   Gastrointestinal: Negative for constipation, diarrhea, nausea and vomiting.   Endocrine: Negative for cold intolerance and heat intolerance.   Genitourinary: Negative for flank pain.   Musculoskeletal: Positive for gait problem. Negative for neck pain and neck stiffness.   Skin: Positive for wound.   Neurological: Positive for numbness. Negative for light-headedness and headaches.   Psychiatric/Behavioral: Negative for sleep disturbance.          Objective:   Resp 17   Ht 5' 3" (1.6 m)   Wt 93 kg (205 lb 0.4 oz)   BMI 36.32 kg/m²     X-Ray Foot 2 View Left  Narrative: EXAMINATION:  XR FOOT 2 " VIEW LEFT    CLINICAL HISTORY:  s/p bunion repair;    COMPARISON:  04/29/2019    FINDINGS:  There has been interval surgical changes associated with a bunionectomy.  There has been interval placement of 2 screws in the distal diaphyseal portion of the left 1st metatarsal.  There has been interval placement of a surgical staple across an osteotomy in the proximal metadiaphyseal portion of the proximal phalanx of the great toe.  There is no dislocation.  Impression: 1. There has been interval surgical changes associated with a bunionectomy. There has been interval placement of 2 screws in the distal diaphyseal portion of the left 1st metatarsal.  2. There has been interval placement of a surgical staple across an osteotomy in the proximal metadiaphyseal portion of the proximal phalanx of the great toe.    Electronically signed by: Venu Perez MD  Date:    05/10/2019  Time:    15:28       LOWER EXTREMITY PHYSICAL EXAMINATION  ORTHOPEDIC:  Anatomic alignment the 1st ray is noted. Mild edema is noted, proximal aspect of the 1st metatarsophalangeal joint, left.  Slight discomfort palpation at the proximal incision.     VASCULAR: On the right and left foot, the dorsalis pedis pulse is 2/4 and the posterior tibial pulse is 2/4. Capillary refill time is less than 3 seconds. Hair growth is sparse, but present on the dorsum of the foot and at the digits. Proximal to distal temperature is warm to warm.    DERMATOLOGY:  Upon removal of sutures, proximally, there is an area delayed closure/dehiscence.  There is slight and scar tissue with underlying fibrosis.  There is no overt wound dehiscence noted. No probe to bone or osseous exposure is noted. No periwound erythema or cellulitis noted. The area of wound dehiscence/delayed closure measures approximately 2.0 cm x 0.30 cm x 0.20 cm.     NEUROLOGY: Protective sensation is intact via 5.07 [place] monofilament. Proprioception is intact. Sensation to light touch is  intact.     Assessment:     1. Hallux valgus (acquired), left foot    2. Pain in left foot    3. Vitamin D deficiency    4. Controlled type 2 diabetes mellitus without complication, without long-term current use of insulin    5. Severe obesity (BMI 35.0-39.9) with comorbidity    6. Delayed surgical wound healing, initial encounter          Plan:     Hallux valgus (acquired), left foot  Pain in left foot  -     sulfamethoxazole-trimethoprim 800-160mg (BACTRIM DS) 800-160 mg Tab; Take 1 tablet by mouth 2 (two) times daily. for 7 days  Dispense: 14 tablet; Refill: 0  -     oxyCODONE-acetaminophen (PERCOCET) 7.5-325 mg per tablet; Take 1 tablet by mouth every 6 (six) hours as needed for Pain.  Dispense: 20 tablet; Refill: 0      Vitamin D deficiency  Continue oral supplementation.    Controlled type 2 diabetes mellitus without complication, without long-term current use of insulin  Patient advised to follow up with Primary Care Physician for management of comorbid states.    Severe obesity (BMI 35.0-39.9) with comorbidity  Patient is counseled and reminded concerning the importance of good nutrition and healthy eating habits, which may include blood sugar control, to prevent and/or help podiatric foot and ankle complications.    Delayed surgical wound healing, initial encounter  -     collagenase (SANTYL) ointment; Apply topically once daily.  Dispense: 30 g; Refill: 1    Thorough discussion is had with the patient today, concerning the diagnosis, its etiology, and the treatment algorithm at present.    The wound was surgically debrided after adequate prep with alcohol and/or betadine paint. Excisional wound debridement was performed using sharp #10/#15 blade/rounded scalpel and tissue nipper, with removal of all non-viable skin and soft tissues; necrotic skin/tissue formation. The woundbase/wound bed was also debrided to encourage bleeding as to promote/stimulate healing. Debridement was excisional and included  epidermal, dermal and subcutaneous tissues. Post debridement measurements are as above. Hemostasis was achieved. Patient tolerated procedure well and without complications. Local woundcare with wet to dry dressings and bandage thereafter.     Patient will continue similar dressings until follow-up on next week.  Patient may transition to surgical shoe.         Future Appointments   Date Time Provider Department Center   6/4/2019 11:45 AM Srinivasan Villanueva DPM [facility]   7/10/2019  1:45 PM [facility] XR2 [facility] [place]   7/10/2019  2:00 PM PULMONARY LAB, [facility] [place]   7/10/2019  2:20 PM [facility]

## 2019-05-29 ENCOUNTER — PATIENT MESSAGE (OUTPATIENT)
Dept: PODIATRY | Facility: CLINIC | Age: 69
End: 2019-05-29

## 2019-06-04 ENCOUNTER — PATIENT MESSAGE (OUTPATIENT)
Dept: PODIATRY | Facility: CLINIC | Age: 69
End: 2019-06-04

## 2019-06-04 ENCOUNTER — HOSPITAL ENCOUNTER (OUTPATIENT)
Dept: RADIOLOGY | Facility: HOSPITAL | Age: 69
Discharge: HOME | End: 2019-06-04
Attending: PODIATRIST
Payer: MEDICARE

## 2019-06-04 ENCOUNTER — OFFICE VISIT (OUTPATIENT)
Dept: PODIATRY | Facility: CLINIC | Age: 69
End: 2019-06-04
Payer: MEDICARE

## 2019-06-04 VITALS
BODY MASS INDEX: 36.5 KG/M2 | HEART RATE: 96 BPM | SYSTOLIC BLOOD PRESSURE: 124 MMHG | WEIGHT: 206 LBS | HEIGHT: 63 IN | DIASTOLIC BLOOD PRESSURE: 85 MMHG

## 2019-06-04 DIAGNOSIS — E11.9 CONTROLLED TYPE 2 DIABETES MELLITUS WITHOUT COMPLICATION, WITHOUT LONG-TERM CURRENT USE OF INSULIN: ICD-10-CM

## 2019-06-04 DIAGNOSIS — M79.672 PAIN IN LEFT FOOT: ICD-10-CM

## 2019-06-04 DIAGNOSIS — E66.01 SEVERE OBESITY (BMI 35.0-39.9) WITH COMORBIDITY: ICD-10-CM

## 2019-06-04 DIAGNOSIS — M79.672 FOOT PAIN, LEFT: Primary | ICD-10-CM

## 2019-06-04 DIAGNOSIS — T81.89XD DELAYED SURGICAL WOUND HEALING, SUBSEQUENT ENCOUNTER: ICD-10-CM

## 2019-06-04 DIAGNOSIS — M20.12 HALLUX VALGUS (ACQUIRED), LEFT FOOT: Primary | ICD-10-CM

## 2019-06-04 DIAGNOSIS — M79.672 FOOT PAIN, LEFT: ICD-10-CM

## 2019-06-04 PROCEDURE — 99999 PR PBB SHADOW E&M-EST. PATIENT-LVL III: ICD-10-PCS | Mod: PBBFAC,HCNC,, | Performed by: PODIATRIST

## 2019-06-04 PROCEDURE — 99024 POSTOP FOLLOW-UP VISIT: CPT | Mod: HCNC,S$GLB,, | Performed by: PODIATRIST

## 2019-06-04 PROCEDURE — 73630 X-RAY EXAM OF FOOT: CPT | Mod: TC,HCNC,LT

## 2019-06-04 PROCEDURE — 73630 X-RAY EXAM OF FOOT: CPT | Mod: 26,HCNC,LT, | Performed by: RADIOLOGY

## 2019-06-04 PROCEDURE — 99499 RISK ADDL DX/OHS AUDIT: ICD-10-PCS | Mod: HCNC,S$GLB,, | Performed by: PODIATRIST

## 2019-06-04 PROCEDURE — 99499 UNLISTED E&M SERVICE: CPT | Mod: HCNC,S$GLB,, | Performed by: PODIATRIST

## 2019-06-04 PROCEDURE — 99999 PR PBB SHADOW E&M-EST. PATIENT-LVL III: CPT | Mod: PBBFAC,HCNC,, | Performed by: PODIATRIST

## 2019-06-04 PROCEDURE — 73630 XR FOOT COMPLETE 3 VIEW LEFT: ICD-10-PCS | Mod: 26,HCNC,LT, | Performed by: RADIOLOGY

## 2019-06-04 PROCEDURE — 99024 PR POST-OP FOLLOW-UP VISIT: ICD-10-PCS | Mod: HCNC,S$GLB,, | Performed by: PODIATRIST

## 2019-06-04 RX ORDER — ACETAMINOPHEN AND CODEINE PHOSPHATE 300; 30 MG/1; MG/1
1 TABLET ORAL EVERY 6 HOURS PRN
Qty: 28 TABLET | Refills: 0 | Status: SHIPPED | OUTPATIENT
Start: 2019-06-04 | End: 2019-06-11

## 2019-06-04 RX ORDER — TRAMADOL HYDROCHLORIDE 50 MG/1
50 TABLET ORAL EVERY 8 HOURS PRN
Qty: 21 TABLET | Refills: 0 | Status: SHIPPED | OUTPATIENT
Start: 2019-06-04 | End: 2019-06-04

## 2019-06-04 NOTE — H&P (VIEW-ONLY)
Subjective:       Patient ID: Cassandra Campos is a 68 y.o. female.    Chief Complaint: Wound Care (left foot pain rated at 3/10; pt reports no problem with wound. )    HPI:  Cassandra Campos presents to the office today, s/p 5/10/19 Nhan bunionectomy, left foot w/ AKIN phalangeal osteotomy, left foot. Patient presents weight-bearing as tolerated with a surgical shoe. Patient states tolerable pains at approximately 3/10. Patient does state improved swelling. Patient is nearly completely oral antibiotics that were prescribed on last week. Patient denies systemic signs of infection at this time. States no drainage from the wound at present. States less redness. Patient states QDaily collagenase dressings. Patient is a well controlled DMII without complications. Denia Maldonado MD is her primary care provider.     Hemoglobin A1C   Date Value Ref Range Status   02/19/2019 6.1 (H) 4.0 - 5.6 % Final     Comment:     ADA Screening Guidelines:  5.7-6.4%  Consistent with prediabetes  >or=6.5%  Consistent with diabetes  High levels of fetal hemoglobin interfere with the HbA1C  assay. Heterozygous hemoglobin variants (HbS, HgC, etc)do  not significantly interfere with this assay.   However, presence of multiple variants may affect accuracy.     08/14/2018 5.7 (H) 4.0 - 5.6 % Final     Comment:     ADA Screening Guidelines:  5.7-6.4%  Consistent with prediabetes  >or=6.5%  Consistent with diabetes  High levels of fetal hemoglobin interfere with the HbA1C  assay. Heterozygous hemoglobin variants (HbS, HgC, etc)do  not significantly interfere with this assay.   However, presence of multiple variants may affect accuracy.     02/12/2018 5.3 4.0 - 5.6 % Final     Comment:     According to ADA guidelines, hemoglobin A1c <7.0% represents  optimal control in non-pregnant diabetic patients. Different  metrics may apply to specific patient populations.   Standards of Medical Care in Diabetes-2016.  For the purpose of screening  for the presence of diabetes:  <5.7%     Consistent with the absence of diabetes  5.7-6.4%  Consistent with increasing risk for diabetes   (prediabetes)  >or=6.5%  Consistent with diabetes  Currently, no consensus exists for use of hemoglobin A1c  for diagnosis of diabetes for children.  This Hemoglobin A1c assay has significant interference with fetal   hemoglobin   (HbF). The results are invalid for patients with abnormal amounts of   HbF,   including those with known Hereditary Persistence   of Fetal Hemoglobin. Heterozygous hemoglobin variants (HbAS, HbAC,   HbAD, HbAE, HbA2) do not significantly interfere with this assay;   however, presence of multiple variants in a sample may impact the %   interference.         Review of patient's allergies indicates:  No Known Allergies    Past Medical History:   Diagnosis Date    Arthritis     hands    Borderline glaucoma     Gastritis     upper GI 2/2017    Hydradenitis     Hyperlipidemia     Hypertension     Insomnia     Migraines 02/01/2000    Morbid obesity due to excess calories 11/1/2016    Nasal septum perforation     Obesity     Pneumonia     Restrictive airway disease     Sleep apnea     SVT (supraventricular tachycardia) 09/2013    Type 2 diabetes mellitus     Type II or unspecified type diabetes mellitus without mention of complication, not stated as uncontrolled 05/20/2013       Family History   Problem Relation Age of Onset    Prostate cancer Brother     Diabetes Maternal Aunt        Social History     Socioeconomic History    Marital status:      Spouse name: Not on file    Number of children: 1    Years of education: Not on file    Highest education level: Not on file   Occupational History    Occupation:  aid   Social Needs    Financial resource strain: Not on file    Food insecurity:     Worry: Not on file     Inability: Not on file    Transportation needs:     Medical: Not on file     Non-medical: Not  on file   Tobacco Use    Smoking status: Former Smoker     Packs/day: 0.50     Years: 30.00     Pack years: 15.00     Types: Cigarettes     Start date: 1970     Last attempt to quit: 2012     Years since quittin.4    Smokeless tobacco: Never Used   Substance and Sexual Activity    Alcohol use: Yes     Comment: rarely // wine  No alcohol 72 HOURS prior to surgery    Drug use: No    Sexual activity: Not Currently     Partners: Male   Lifestyle    Physical activity:     Days per week: Not on file     Minutes per session: Not on file    Stress: Not on file   Relationships    Social connections:     Talks on phone: Not on file     Gets together: Not on file     Attends Baptism service: Not on file     Active member of club or organization: Not on file     Attends meetings of clubs or organizations: Not on file     Relationship status: Not on file   Other Topics Concern    Not on file   Social History Narrative    Single, part-time teacher. Masters degree biology.        Past Surgical History:   Procedure Laterality Date    ARTHROGRAM Left 2016    Performed by Jayro Pedraza Sr., MD at [facility] OR    ARTHROSCOPY-KNEE Right 2017    Performed by Jayro Pedraza Sr., MD at [facility] OR    ARTHROSCOPY-KNEE W/ CHONDROPLASTY Right 2017    Performed by Jayro Ped[facility] Sr[facility] MD at [facility] OR    ARTHROSCOPY-MENISCECTOMY Right 2017    Performed by Jayro Ped[facility] Sr[facility] at [facility] OR    AXILLARY HIDRADENITIS EXCISION      BREAST BIOPSY Bilateral     BREAST LUMPECTOMY Bilateral 1998    Benign    BUNIONECTOMY, WITH METATARSAL OSTEOTOMY Left 5/10/2019    Performed by Srinivasan Villanueva DPM at [facility]    BURSECTOMY, OLECRANON Right 2018    Performed by Jayro Ped[facility] Sr[facility] at [facility] OR    CARPAL TUNNEL RELEASE      bilateral    CATARACT EXTRACTION Bilateral     OU     SECTION  1979    CHOLECYSTECTOMY  2014    CYST REMOVAL  2015    sebaceous cyst removed from face     "ESOPHAGOGASTRODUODENOSCOPY (EGD) N/A 2/8/2017    Performed by [facility]ad Watson [facility] at [facility] ENDO    EXCISION, EXOSTOSIS Right 7/25/2018    Performed by [facility] Sr[facility] at [facility] OR    gastric sleeve  02/13/2017    Dr. Watson    KNEE SURGERY Right     MICROFRACTURE Right 6/16/2017    Performed by [facility] Sr[facility] at [facility] OR    RELEASE-FINGER-TRIGGER Right 6/16/2017    Performed by [facility]ro [facility] Sr[facility] at [facility] OR    RELEASE-FINGER-TRIGGER Right 4/1/2015    Performed by [facility] Sr[facility] at [facility]    SYNOVECTOMY-KNEE Right 6/16/2017    Performed by Jayro Pedra[facility] Sr., [facility] at [facility]    TENOSYNOVECTOMY Right 6/16/2017    Performed by [facility] at [facility] OR    TENOSYNOVECTOMY Right 4/1/2015    Performed by [facility] Sr[facility] at [facility] OR    TONSILLECTOMY, ADENOIDECTOMY  1980s    TRIGGER FINGER RELEASE Right april 1, 2015    Dr. Pedraza    XI ROBOT ASSISTED LAPAROSCOPIC SLEEVE-GASTRECTOMY N/A 2/13/2017    Performed by Rashaad Watson MD at [facility] OR       Review of Systems   Constitutional: Negative for chills, fatigue and fever.   HENT: Negative for hearing loss.    Eyes: Negative for photophobia and visual disturbance.   Respiratory: Negative for cough, chest tightness, shortness of breath and wheezing.    Cardiovascular: Negative for chest pain and palpitations.   Gastrointestinal: Negative for constipation, diarrhea, nausea and vomiting.   Endocrine: Negative for cold intolerance and heat intolerance.   Genitourinary: Negative for flank pain.   Musculoskeletal: Positive for arthralgias and gait problem. Negative for neck pain and neck stiffness.   Skin: Positive for wound. Negative for color change.   Neurological: Negative for light-headedness, numbness and headaches.   Psychiatric/Behavioral: Negative for sleep disturbance.          Objective:   /85 (BP Location: Right arm, Patient Position: Sitting)   Pulse 96   Ht 5' 3" (1.6 m)   Wt 93.4 kg (206 lb)   BMI 36.49 kg/m²      X-Ray " Foot 2 View Left  Narrative: EXAMINATION:  XR FOOT 2 VIEW LEFT    CLINICAL HISTORY:  s/p bunion repair;    COMPARISON:  04/29/2019    FINDINGS:  There has been interval surgical changes associated with a bunionectomy.  There has been interval placement of 2 screws in the distal diaphyseal portion of the left 1st metatarsal.  There has been interval placement of a surgical staple across an osteotomy in the proximal metadiaphyseal portion of the proximal phalanx of the great toe.  There is no dislocation.  Impression: 1. There has been interval surgical changes associated with a bunionectomy. There has been interval placement of 2 screws in the distal diaphyseal portion of the left 1st metatarsal.  2. There has been interval placement of a surgical staple across an osteotomy in the proximal metadiaphyseal portion of the proximal phalanx of the great toe.    Electronically signed by: Venu Perez MD  Date:    05/10/2019  Time:    15:28     LOWER EXTREMITY PHYSICAL EXAMINATION    NEUROLOGY: Proprioception is intact. Sensation to light touch is intact. Lisa-incisional sensation is intact.    VASCULAR: On the left foot, the dorsalis pedis pulse is 2/4 and the posterior tibial pulse is 2/4. Capillary refill time is less than 3 seconds. Hair growth is present on the dorsum of the foot and at the digits. No rubor is present. Proximal to distal temperature is warm to warm.     DERMATOLOGY: Persistent area of delayed wound healing is noted to the proximal aspect of the incision site. No cellulitis is noted. No erythema is noted. No fluctuance. No purulence is noted. No osseous exposure is noted. Mild lisa-incisional edema and hyperpigmentation is noted.     ORTHOPEDIC: Mild edema is noted to the left 1st MTPJ. Decreased ROM of the 1st MTPJ is noted. No overt crepitus is noted.      Assessment:     1. Hallux valgus (acquired), left foot    2. Pain in left foot    3. Controlled type 2 diabetes mellitus without complication,  without long-term current use of insulin    4. Severe obesity (BMI 35.0-39.9) with comorbidity    5. Delayed surgical wound healing, subsequent encounter          Plan:     Hallux valgus (acquired), left foot  Pain in left foot  Delayed surgical wound healing, subsequent encounter  Thorough discussion is had with the patient today, concerning the diagnosis, its etiology, and the treatment algorithm at present.  Orders placed for x-ray evaluation.    The wound was surgically debrided after adequate prep with alcohol and/or betadine paint. Excisional wound debridement was performed using sharp #10/#15 blade/rounded scalpel and tissue nipper, with removal of all non-viable skin and soft tissues; necrotic skin/tissue formation. The woundbase/wound bed was also debrided to encourage bleeding as to promote/stimulate healing. Debridement was excisional and included epidermal, dermal and subcutaneous tissues. Post debridement measurements are as above. Hemostasis was achieved. Patient tolerated procedure well and without complications. Local woundcare with wet to dry dressings and bandage thereafter.         Patient may continue daily collagenase changes as instructed. No signs of localized infection at this time. Patient to continue oral antibiotics as prescribed at present. Patient to follow up in approximately 10 days.     Controlled type 2 diabetes mellitus without complication, without long-term current use of insulin  I counseled the patient on his/her Diabetic Mellitus regarding today's clinical examination and objection findings. We did also discuss recent medication changes, pertinent labs and imaging evaluations and other medical consultation notes and progress notes. Greater than 50% of this visit was spent on counseling and coordination of care. Greater than 20 minutes of this appt. was spent on education about the diabetic foot, in relation to PVD and/or neuropathy, and the prevention of limb loss.     Shoe  gear is inspected and wear and proper fit/type. Patient is reminded of the importance of good nutrition and blood sugar control to help prevent podiatric complications of diabetes. Patient instructed on proper foot hygeine. We discussed wearing proper shoe gear, daily foot inspections, never walking without protective shoe gear, never putting sharp instruments to feet.  Patient  will continue to monitor the areas daily, inspect feet, wear protective shoe gear when ambulatory, moisturizer to maintain skin integrity.     Patient's DMI/DMII is managed by Primary Care Provider and/or Endocrinology Advanced Practice Provider. As per the most recent glycohemoglobin level, this patient is  at goal.    Severe obesity (BMI 35.0-39.9) with comorbidity  Patient is counseled and reminded concerning the importance of good nutrition and healthy eating habits, which may include blood sugar control, to prevent and/or help podiatric foot and ankle complications.          Future Appointments   Date Time Provider Department Center   6/4/2019  1:15 PM [facility] XR1- [facility] [place]   6/18/2019  1:30 PM Srinivasan Villanueva DPM [facility]   7/10/2019  1:45 PM [facility] XR2 [facility] [place]   7/10/2019  2:00 PM [facility] [place]   7/10/2019  2:20 PM Nixon Mcdermott MD [facility] [place]

## 2019-06-04 NOTE — PROGRESS NOTES
Subjective:       Patient ID: Cassandra Campos is a 68 y.o. female.    Chief Complaint: Wound Care (left foot pain rated at 3/10; pt reports no problem with wound. )    HPI:  Cassandra Campos presents to the office today, s/p 5/10/19 Nhan bunionectomy, left foot w/ AKIN phalangeal osteotomy, left foot. Patient presents weight-bearing as tolerated with a surgical shoe. Patient states tolerable pains at approximately 3/10. Patient does state improved swelling. Patient is nearly completely oral antibiotics that were prescribed on last week. Patient denies systemic signs of infection at this time. States no drainage from the wound at present. States less redness. Patient states QDaily collagenase dressings. Patient is a well controlled DMII without complications. Denia Maldonado MD is her primary care provider.     Hemoglobin A1C   Date Value Ref Range Status   02/19/2019 6.1 (H) 4.0 - 5.6 % Final     Comment:     ADA Screening Guidelines:  5.7-6.4%  Consistent with prediabetes  >or=6.5%  Consistent with diabetes  High levels of fetal hemoglobin interfere with the HbA1C  assay. Heterozygous hemoglobin variants (HbS, HgC, etc)do  not significantly interfere with this assay.   However, presence of multiple variants may affect accuracy.     08/14/2018 5.7 (H) 4.0 - 5.6 % Final     Comment:     ADA Screening Guidelines:  5.7-6.4%  Consistent with prediabetes  >or=6.5%  Consistent with diabetes  High levels of fetal hemoglobin interfere with the HbA1C  assay. Heterozygous hemoglobin variants (HbS, HgC, etc)do  not significantly interfere with this assay.   However, presence of multiple variants may affect accuracy.     02/12/2018 5.3 4.0 - 5.6 % Final     Comment:     According to ADA guidelines, hemoglobin A1c <7.0% represents  optimal control in non-pregnant diabetic patients. Different  metrics may apply to specific patient populations.   Standards of Medical Care in Diabetes-2016.  For the purpose of screening  for the presence of diabetes:  <5.7%     Consistent with the absence of diabetes  5.7-6.4%  Consistent with increasing risk for diabetes   (prediabetes)  >or=6.5%  Consistent with diabetes  Currently, no consensus exists for use of hemoglobin A1c  for diagnosis of diabetes for children.  This Hemoglobin A1c assay has significant interference with fetal   hemoglobin   (HbF). The results are invalid for patients with abnormal amounts of   HbF,   including those with known Hereditary Persistence   of Fetal Hemoglobin. Heterozygous hemoglobin variants (HbAS, HbAC,   HbAD, HbAE, HbA2) do not significantly interfere with this assay;   however, presence of multiple variants in a sample may impact the %   interference.         Review of patient's allergies indicates:  No Known Allergies    Past Medical History:   Diagnosis Date    Arthritis     hands    Borderline glaucoma     Gastritis     upper GI 2/2017    Hydradenitis     Hyperlipidemia     Hypertension     Insomnia     Migraines 02/01/2000    Morbid obesity due to excess calories 11/1/2016    Nasal septum perforation     Obesity     Pneumonia     Restrictive airway disease     Sleep apnea     SVT (supraventricular tachycardia) 09/2013    Type 2 diabetes mellitus     Type II or unspecified type diabetes mellitus without mention of complication, not stated as uncontrolled 05/20/2013       Family History   Problem Relation Age of Onset    Prostate cancer Brother     Diabetes Maternal Aunt        Social History     Socioeconomic History    Marital status:      Spouse name: Not on file    Number of children: 1    Years of education: Not on file    Highest education level: Not on file   Occupational History    Occupation:  aid   Social Needs    Financial resource strain: Not on file    Food insecurity:     Worry: Not on file     Inability: Not on file    Transportation needs:     Medical: Not on file     Non-medical: Not  on file   Tobacco Use    Smoking status: Former Smoker     Packs/day: 0.50     Years: 30.00     Pack years: 15.00     Types: Cigarettes     Start date: 1970     Last attempt to quit: 2012     Years since quittin.4    Smokeless tobacco: Never Used   Substance and Sexual Activity    Alcohol use: Yes     Comment: rarely // wine  No alcohol 72 HOURS prior to surgery    Drug use: No    Sexual activity: Not Currently     Partners: Male   Lifestyle    Physical activity:     Days per week: Not on file     Minutes per session: Not on file    Stress: Not on file   Relationships    Social connections:     Talks on phone: Not on file     Gets together: Not on file     Attends Mosque service: Not on file     Active member of club or organization: Not on file     Attends meetings of clubs or organizations: Not on file     Relationship status: Not on file   Other Topics Concern    Not on file   Social History Narrative    Single, part-time teacher. Masters degree biology.        Past Surgical History:   Procedure Laterality Date    ARTHROGRAM Left 2016    Performed by [facility] Sr[facility] at [facility]    ARTHROSCOPY-KNEE Right 2017    Per[facility] at [facility]    ARTHROSCOPY-KNEE W/ CHONDROPLASTY Right 2017    [facility] at [facility]    ARTHROSCOPY-MENISCECTOMY Right 2017    [facility] at [facility]    AXILLARY HIDRADENITIS EXCISION      BREAST BIOPSY Bilateral     BREAST LUMPECTOMY Bilateral 1998    Benign    BUNIONECTOMY, WITH METATARSAL OSTEOTOMY Left 5/10/2019    Performed by Srinivasan Vill[facility] D[facility] at [facility]    BURSECTOMY, OLECRANON Right 2018    [facility] at [facility]    CARPAL TUNNEL RELEASE      bilateral    CATARACT EXTRACTION Bilateral     OU     SECTION  1979    CHOLECYSTECTOMY  2014    CYST REMOVAL  2015    sebaceous cyst removed from face     "ESOPHAGOGASTRODUODENOSCOPY (EGD) N/A 2/8/2017    Performed [facility]ad [facility] at [facility]    EXCISION, EXOSTOSIS Right 7/25/2018    Per[facility] by [facility] at [facility] OR    gastric sleeve  02/13/2017    Dr. Watsno    KNEE SURGERY Right     MICROFRACTURE Right 6/16/2017    Performed by [facility] Sr[facility] at [facility] OR    RELEASE-FINGER-TRIGGER Right 6/16/2017    Performed by Jayro Pedraza Sr., MD at [facility] OR    RELEASE-FINGER-TRIGGER Right 4/1/2015    Performed by [facility] Sr[facility] at [facility]    SYNOVECTOMY-KNEE Right 6/16/2017    Performed by [facility] Sr[facility]    TENOSYNOVECTOMY Right 6/16/2017    Performed by [facility] Sr[facility]    TENOSYNOVECTOMY Right 4/1/2015    Performed by Jayro [facility] Sr., [facility] at [facility]    TONSILLECTOMY, ADENOIDECTOMY  1980s    TRIGGER FINGER RELEASE Right april 1, 2015    Dr. Pedraza    XI ROBOT ASSISTED LAPAROSCOPIC SLEEVE-GASTRECTOMY N/A 2/13/2017    Performed by R[facility]ad Watson MD at [facility] OR       Review of Systems   Constitutional: Negative for chills, fatigue and fever.   HENT: Negative for hearing loss.    Eyes: Negative for photophobia and visual disturbance.   Respiratory: Negative for cough, chest tightness, shortness of breath and wheezing.    Cardiovascular: Negative for chest pain and palpitations.   Gastrointestinal: Negative for constipation, diarrhea, nausea and vomiting.   Endocrine: Negative for cold intolerance and heat intolerance.   Genitourinary: Negative for flank pain.   Musculoskeletal: Positive for arthralgias and gait problem. Negative for neck pain and neck stiffness.   Skin: Positive for wound. Negative for color change.   Neurological: Negative for light-headedness, numbness and headaches.   Psychiatric/Behavioral: Negative for sleep disturbance.          Objective:   /85 (BP Location: Right arm, Patient Position: Sitting)   Pulse 96   Ht 5' 3" (1.6 m)   Wt 93.4 kg (206 lb)   BMI 36.49 kg/m²      X-Ray " Foot 2 View Left  Narrative: EXAMINATION:  XR FOOT 2 VIEW LEFT    CLINICAL HISTORY:  s/p bunion repair;    COMPARISON:  04/29/2019    FINDINGS:  There has been interval surgical changes associated with a bunionectomy.  There has been interval placement of 2 screws in the distal diaphyseal portion of the left 1st metatarsal.  There has been interval placement of a surgical staple across an osteotomy in the proximal metadiaphyseal portion of the proximal phalanx of the great toe.  There is no dislocation.  Impression: 1. There has been interval surgical changes associated with a bunionectomy. There has been interval placement of 2 screws in the distal diaphyseal portion of the left 1st metatarsal.  2. There has been interval placement of a surgical staple across an osteotomy in the proximal metadiaphyseal portion of the proximal phalanx of the great toe.    Electronically signed by: Venu Perez MD  Date:    05/10/2019  Time:    15:28     LOWER EXTREMITY PHYSICAL EXAMINATION    NEUROLOGY: Proprioception is intact. Sensation to light touch is intact. Lisa-incisional sensation is intact.    VASCULAR: On the left foot, the dorsalis pedis pulse is 2/4 and the posterior tibial pulse is 2/4. Capillary refill time is less than 3 seconds. Hair growth is present on the dorsum of the foot and at the digits. No rubor is present. Proximal to distal temperature is warm to warm.     DERMATOLOGY: Persistent area of delayed wound healing is noted to the proximal aspect of the incision site. No cellulitis is noted. No erythema is noted. No fluctuance. No purulence is noted. No osseous exposure is noted. Mild lisa-incisional edema and hyperpigmentation is noted.     ORTHOPEDIC: Mild edema is noted to the left 1st MTPJ. Decreased ROM of the 1st MTPJ is noted. No overt crepitus is noted.      Assessment:     1. Hallux valgus (acquired), left foot    2. Pain in left foot    3. Controlled type 2 diabetes mellitus without complication,  without long-term current use of insulin    4. Severe obesity (BMI 35.0-39.9) with comorbidity    5. Delayed surgical wound healing, subsequent encounter          Plan:     Hallux valgus (acquired), left foot  Pain in left foot  Delayed surgical wound healing, subsequent encounter  Thorough discussion is had with the patient today, concerning the diagnosis, its etiology, and the treatment algorithm at present.  Orders placed for x-ray evaluation.    The wound was surgically debrided after adequate prep with alcohol and/or betadine paint. Excisional wound debridement was performed using sharp #10/#15 blade/rounded scalpel and tissue nipper, with removal of all non-viable skin and soft tissues; necrotic skin/tissue formation. The woundbase/wound bed was also debrided to encourage bleeding as to promote/stimulate healing. Debridement was excisional and included epidermal, dermal and subcutaneous tissues. Post debridement measurements are as above. Hemostasis was achieved. Patient tolerated procedure well and without complications. Local woundcare with wet to dry dressings and bandage thereafter.         Patient may continue daily collagenase changes as instructed. No signs of localized infection at this time. Patient to continue oral antibiotics as prescribed at present. Patient to follow up in approximately 10 days.     Controlled type 2 diabetes mellitus without complication, without long-term current use of insulin  I counseled the patient on his/her Diabetic Mellitus regarding today's clinical examination and objection findings. We did also discuss recent medication changes, pertinent labs and imaging evaluations and other medical consultation notes and progress notes. Greater than 50% of this visit was spent on counseling and coordination of care. Greater than 20 minutes of this appt. was spent on education about the diabetic foot, in relation to PVD and/or neuropathy, and the prevention of limb loss.     Shoe  gear is inspected and wear and proper fit/type. Patient is reminded of the importance of good nutrition and blood sugar control to help prevent podiatric complications of diabetes. Patient instructed on proper foot hygeine. We discussed wearing proper shoe gear, daily foot inspections, never walking without protective shoe gear, never putting sharp instruments to feet.  Patient  will continue to monitor the areas daily, inspect feet, wear protective shoe gear when ambulatory, moisturizer to maintain skin integrity.     Patient's DMI/DMII is managed by Primary Care Provider and/or Endocrinology Advanced Practice Provider. As per the most recent glycohemoglobin level, this patient is  at goal.    Severe obesity (BMI 35.0-39.9) with comorbidity  Patient is counseled and reminded concerning the importance of good nutrition and healthy eating habits, which may include blood sugar control, to prevent and/or help podiatric foot and ankle complications.          Future Appointments   Date Time Provider Department Center   6/4/2019  1:15 PM [facility] XR1- [facility] [place]   6/18/2019  1:30 PM Srinivasan Villanueva DPM [facility]   7/10/2019  1:45 PM [facility] XR2 [facility] [place]   7/10/2019  2:00 PM [facility] [place]   7/10/2019  2:20 PM Nixon Mcdermott MD [facility] [place]

## 2019-06-18 ENCOUNTER — OFFICE VISIT (OUTPATIENT)
Dept: PODIATRY | Facility: CLINIC | Age: 69
End: 2019-06-18
Payer: MEDICARE

## 2019-06-18 ENCOUNTER — LAB VISIT (OUTPATIENT)
Dept: LAB | Facility: HOSPITAL | Age: 69
End: 2019-06-18
Attending: PODIATRIST
Payer: MEDICARE

## 2019-06-18 VITALS
HEART RATE: 112 BPM | RESPIRATION RATE: 17 BRPM | SYSTOLIC BLOOD PRESSURE: 143 MMHG | WEIGHT: 205.94 LBS | BODY MASS INDEX: 36.49 KG/M2 | HEIGHT: 63 IN | DIASTOLIC BLOOD PRESSURE: 81 MMHG

## 2019-06-18 DIAGNOSIS — T81.89XD DELAYED SURGICAL WOUND HEALING, SUBSEQUENT ENCOUNTER: Primary | ICD-10-CM

## 2019-06-18 DIAGNOSIS — M79.671 PAIN IN RIGHT FOOT: ICD-10-CM

## 2019-06-18 DIAGNOSIS — M79.672 FOOT PAIN, LEFT: ICD-10-CM

## 2019-06-18 DIAGNOSIS — M20.11 HALLUX VALGUS (ACQUIRED), RIGHT FOOT: ICD-10-CM

## 2019-06-18 DIAGNOSIS — E11.9 CONTROLLED TYPE 2 DIABETES MELLITUS WITHOUT COMPLICATION, WITHOUT LONG-TERM CURRENT USE OF INSULIN: ICD-10-CM

## 2019-06-18 DIAGNOSIS — M20.12 HALLUX VALGUS (ACQUIRED), LEFT FOOT: ICD-10-CM

## 2019-06-18 DIAGNOSIS — E66.01 SEVERE OBESITY (BMI 35.0-39.9) WITH COMORBIDITY: ICD-10-CM

## 2019-06-18 LAB
ALBUMIN SERPL BCP-MCNC: 3.9 G/DL (ref 3.5–5.2)
ALP SERPL-CCNC: 126 U/L (ref 55–135)
ALT SERPL W/O P-5'-P-CCNC: 37 U/L (ref 10–44)
ANION GAP SERPL CALC-SCNC: 12 MMOL/L (ref 8–16)
AST SERPL-CCNC: 35 U/L (ref 10–40)
BILIRUB SERPL-MCNC: 0.3 MG/DL (ref 0.1–1)
BUN SERPL-MCNC: 12 MG/DL (ref 8–23)
CALCIUM SERPL-MCNC: 10.4 MG/DL (ref 8.7–10.5)
CHLORIDE SERPL-SCNC: 104 MMOL/L (ref 95–110)
CO2 SERPL-SCNC: 25 MMOL/L (ref 23–29)
CREAT SERPL-MCNC: 0.8 MG/DL (ref 0.5–1.4)
ERYTHROCYTE [DISTWIDTH] IN BLOOD BY AUTOMATED COUNT: 15.9 % (ref 11.5–14.5)
EST. GFR  (AFRICAN AMERICAN): >60 ML/MIN/1.73 M^2
EST. GFR  (NON AFRICAN AMERICAN): >60 ML/MIN/1.73 M^2
ESTIMATED AVG GLUCOSE: 131 MG/DL (ref 68–131)
GLUCOSE SERPL-MCNC: 126 MG/DL (ref 70–110)
HBA1C MFR BLD HPLC: 6.2 % (ref 4–5.6)
HCT VFR BLD AUTO: 43.3 % (ref 37–48.5)
HGB BLD-MCNC: 13.7 G/DL (ref 12–16)
MCH RBC QN AUTO: 24.6 PG (ref 27–31)
MCHC RBC AUTO-ENTMCNC: 31.6 G/DL (ref 32–36)
MCV RBC AUTO: 78 FL (ref 82–98)
PLATELET # BLD AUTO: 273 K/UL (ref 150–350)
PMV BLD AUTO: 10.7 FL (ref 9.2–12.9)
POTASSIUM SERPL-SCNC: 4 MMOL/L (ref 3.5–5.1)
PROT SERPL-MCNC: 8.8 G/DL (ref 6–8.4)
RBC # BLD AUTO: 5.57 M/UL (ref 4–5.4)
SODIUM SERPL-SCNC: 141 MMOL/L (ref 136–145)
WBC # BLD AUTO: 7.39 K/UL (ref 3.9–12.7)

## 2019-06-18 PROCEDURE — 80053 COMPREHEN METABOLIC PANEL: CPT | Mod: HCNC

## 2019-06-18 PROCEDURE — 3077F SYST BP >= 140 MM HG: CPT | Mod: HCNC,CPTII,S$GLB, | Performed by: PODIATRIST

## 2019-06-18 PROCEDURE — 1101F PT FALLS ASSESS-DOCD LE1/YR: CPT | Mod: HCNC,CPTII,S$GLB, | Performed by: PODIATRIST

## 2019-06-18 PROCEDURE — 3079F DIAST BP 80-89 MM HG: CPT | Mod: HCNC,CPTII,S$GLB, | Performed by: PODIATRIST

## 2019-06-18 PROCEDURE — 3044F HG A1C LEVEL LT 7.0%: CPT | Mod: HCNC,CPTII,S$GLB, | Performed by: PODIATRIST

## 2019-06-18 PROCEDURE — 99999 PR PBB SHADOW E&M-EST. PATIENT-LVL IV: CPT | Mod: PBBFAC,HCNC,, | Performed by: PODIATRIST

## 2019-06-18 PROCEDURE — 3079F PR MOST RECENT DIASTOLIC BLOOD PRESSURE 80-89 MM HG: ICD-10-PCS | Mod: HCNC,CPTII,S$GLB, | Performed by: PODIATRIST

## 2019-06-18 PROCEDURE — 3044F PR MOST RECENT HEMOGLOBIN A1C LEVEL <7.0%: ICD-10-PCS | Mod: HCNC,CPTII,S$GLB, | Performed by: PODIATRIST

## 2019-06-18 PROCEDURE — 99214 PR OFFICE/OUTPT VISIT, EST, LEVL IV, 30-39 MIN: ICD-10-PCS | Mod: 57,HCNC,S$GLB, | Performed by: PODIATRIST

## 2019-06-18 PROCEDURE — 1101F PR PT FALLS ASSESS DOC 0-1 FALLS W/OUT INJ PAST YR: ICD-10-PCS | Mod: HCNC,CPTII,S$GLB, | Performed by: PODIATRIST

## 2019-06-18 PROCEDURE — 85027 COMPLETE CBC AUTOMATED: CPT | Mod: HCNC

## 2019-06-18 PROCEDURE — 36415 COLL VENOUS BLD VENIPUNCTURE: CPT | Mod: HCNC

## 2019-06-18 PROCEDURE — 99214 OFFICE O/P EST MOD 30 MIN: CPT | Mod: 57,HCNC,S$GLB, | Performed by: PODIATRIST

## 2019-06-18 PROCEDURE — 83036 HEMOGLOBIN GLYCOSYLATED A1C: CPT | Mod: HCNC

## 2019-06-18 PROCEDURE — 99999 PR PBB SHADOW E&M-EST. PATIENT-LVL IV: ICD-10-PCS | Mod: PBBFAC,HCNC,, | Performed by: PODIATRIST

## 2019-06-18 PROCEDURE — 3077F PR MOST RECENT SYSTOLIC BLOOD PRESSURE >= 140 MM HG: ICD-10-PCS | Mod: HCNC,CPTII,S$GLB, | Performed by: PODIATRIST

## 2019-06-18 NOTE — H&P (VIEW-ONLY)
Subjective:       Patient ID: Cassandra Campos is a 68 y.o. female.    Chief Complaint: Wound Check (Left wound check, pain 0/10,ambulation withotu ephraim, wear post-op, diabetic, PCP Dr. Maldonado)    HPI:  Cassandra Campos presents to the office today, s/p 5/10/19 Nhan bunionectomy, left foot w/ AKIN phalangeal osteotomy, left foot. Patient presents weight-bearing as tolerated with sneaker. Patient states tolerable pains at approximately 0/10 on the right lower extremity.  Patient states the wound has long since healed.  Patient is a well controlled DMII without complications. Denia Maldonado MD is her primary care provider.  At this time, patient is interested in surgical intervention on the contralateral limb.  She states pains due to bunion deformity.    Hemoglobin A1C   Date Value Ref Range Status   02/19/2019 6.1 (H) 4.0 - 5.6 % Final     Comment:     ADA Screening Guidelines:  5.7-6.4%  Consistent with prediabetes  >or=6.5%  Consistent with diabetes  High levels of fetal hemoglobin interfere with the HbA1C  assay. Heterozygous hemoglobin variants (HbS, HgC, etc)do  not significantly interfere with this assay.   However, presence of multiple variants may affect accuracy.     08/14/2018 5.7 (H) 4.0 - 5.6 % Final     Comment:     ADA Screening Guidelines:  5.7-6.4%  Consistent with prediabetes  >or=6.5%  Consistent with diabetes  High levels of fetal hemoglobin interfere with the HbA1C  assay. Heterozygous hemoglobin variants (HbS, HgC, etc)do  not significantly interfere with this assay.   However, presence of multiple variants may affect accuracy.     02/12/2018 5.3 4.0 - 5.6 % Final     Comment:     According to ADA guidelines, hemoglobin A1c <7.0% represents  optimal control in non-pregnant diabetic patients. Different  metrics may apply to specific patient populations.   Standards of Medical Care in Diabetes-2016.  For the purpose of screening for the presence of diabetes:  <5.7%     Consistent with  the absence of diabetes  5.7-6.4%  Consistent with increasing risk for diabetes   (prediabetes)  >or=6.5%  Consistent with diabetes  Currently, no consensus exists for use of hemoglobin A1c  for diagnosis of diabetes for children.  This Hemoglobin A1c assay has significant interference with fetal   hemoglobin   (HbF). The results are invalid for patients with abnormal amounts of   HbF,   including those with known Hereditary Persistence   of Fetal Hemoglobin. Heterozygous hemoglobin variants (HbAS, HbAC,   HbAD, HbAE, HbA2) do not significantly interfere with this assay;   however, presence of multiple variants in a sample may impact the %   interference.           Review of patient's allergies indicates:  No Known Allergies    Past Medical History:   Diagnosis Date    Arthritis     hands    Borderline glaucoma     Gastritis     upper GI 2/2017    Hydradenitis     Hyperlipidemia     Hypertension     Insomnia     Migraines 02/01/2000    Morbid obesity due to excess calories 11/1/2016    Nasal septum perforation     Obesity     Pneumonia     Restrictive airway disease     Sleep apnea     SVT (supraventricular tachycardia) 09/2013    Type 2 diabetes mellitus     Type II or unspecified type diabetes mellitus without mention of complication, not stated as uncontrolled 05/20/2013       Family History   Problem Relation Age of Onset    Prostate cancer Brother     Diabetes Maternal Aunt        Social History     Socioeconomic History    Marital status:      Spouse name: Not on file    Number of children: 1    Years of education: Not on file    Highest education level: Not on file   Occupational History    Occupation:  aid   Social Needs    Financial resource strain: Not on file    Food insecurity:     Worry: Not on file     Inability: Not on file    Transportation needs:     Medical: Not on file     Non-medical: Not on file   Tobacco Use    Smoking status: Former Smoker      Packs/day: 0.50     Years: 30.00     Pack years: 15.00     Types: Cigarettes     Start date: 1970     Last attempt to quit: 2012     Years since quittin.4    Smokeless tobacco: Never Used   Substance and Sexual Activity    Alcohol use: Yes     Comment: rarely // wine  No alcohol 72 HOURS prior to surgery    Drug use: No    Sexual activity: Not Currently     Partners: Male   Lifestyle    Physical activity:     Days per week: Not on file     Minutes per session: Not on file    Stress: Not on file   Relationships    Social connections:     Talks on phone: Not on file     Gets together: Not on file     Attends [employer]ism service: Not on file     Active member of club or organization: Not on file     Attends meetings of clubs or organizations: Not on file     Relationship status: Not on file   Other Topics Concern    Not on file   Social History Narrative    Single, part-time teacher. Masters degree biology.        Past Surgical History:   Procedure Laterality Date    ARTHROGRAM Left 2016    Performed by [facility]raza Sr[facility] at [facility] OR    ARTHROSCOPY-KNEE Right 2017    Per[facility] at [facility]    ARTHROSCOPY-KNEE W/ CHONDROPLASTY Right 2017    [facility] at [facility]    ARTHROSCOPY-MENISCECTOMY Right 2017    [facility] at [facility]    AXILLARY HIDRADENITIS EXCISION      BREAST BIOPSY Bilateral     BREAST LUMPECTOMY Bilateral 1998    Benign    BUNIONECTOMY, WITH METATARSAL OSTEOTOMY Left 5/10/2019    Performed [facility]rin[facility]an [facility]ueva DPM at [facility] OR    BURSECTOMY, OLECRANON Right 2018    [facility] at [facility]    CARPAL TUNNEL RELEASE      bilateral    CATARACT EXTRACTION Bilateral     OU     SECTION  1979    CHOLECYSTECTOMY  2014    CYST REMOVAL  2015    sebaceous cyst removed from face    ESOPHAGOGASTRODUODENOSCOPY (EGD) N/A 2017    Performed by  "[facility]ad Watson MD at [facility] ENDO    EXCISION, EXOSTOSIS Right 7/25/2018    Per[facility] at [facility] OR    gastric sleeve  02/13/2017    Dr. Watson    KNEE SURGERY Right     MICROFRACTURE Right 6/16/2017    Performed by [facility] Sr[facility] at [facility] OR    RELEASE-FINGER-TRIGGER Right 6/16/2017    Performed by Jayro Pedraza Sr., [facility] at [facility]    RELEASE-FINGER-TRIGGER Right 4/1/2015    Performed by Jayro [facility] Sr[facility]    SYNOVECTOMY-KNEE Right 6/16/2017    Performed by Jayro [facility] Sr[facility]    TENOSYNOVECTOMY Right 6/16/2017    Performed by Jay[facility] Sr[facility]    TENOSYNOVECTOMY Right 4/1/2015    Performed by Jayro [facility] Sr., [facility] at [facility]    TONSILLECTOMY, ADENOIDECTOMY  1980s    TRIGGER FINGER RELEASE Right april 1, 2015    Dr. Pedraza    XI ROBOT ASSISTED LAPAROSCOPIC SLEEVE-GASTRECTOMY N/A 2/13/2017    Performed by Rashaad Watson MD at [facility] OR       Review of Systems   Constitutional: Negative for chills, fatigue and fever.   HENT: Negative for hearing loss.    Eyes: Negative for photophobia and visual disturbance.   Respiratory: Negative for cough, chest tightness, shortness of breath and wheezing.    Cardiovascular: Negative for chest pain and palpitations.   Gastrointestinal: Negative for constipation, diarrhea, nausea and vomiting.   Endocrine: Negative for cold intolerance and heat intolerance.   Genitourinary: Negative for flank pain.   Musculoskeletal: Positive for arthralgias and gait problem. Negative for neck pain and neck stiffness.   Neurological: Negative for light-headedness, numbness and headaches.   Psychiatric/Behavioral: Negative for sleep disturbance.          Objective:   BP (!) 143/81 (BP Location: Right arm, Patient Position: Sitting, BP Method: Medium (Automatic))   Pulse (!) 112   Resp 17   Ht 5' 3" (1.6 m)   Wt 93.4 kg (205 lb 14.6 oz)   BMI 36.48 kg/m²     X-Ray Foot Complete Bilateral   Order: [id number]   Status:  Final " result   Visible to patient:  Yes (Patient Portal) Next appt:  07/10/2019 at 01:45 PM in Radiology ([facility] XR2) Dx:  Hallux valgus (acquired), right foot;...   Details     Reading Physician Reading Date Result Priority   Ulises Dunne MD 4/29/2019       Narrative     EXAMINATION:  XR FOOT COMPLETE 3 VIEW BILATERAL    CLINICAL HISTORY:  Hallux valgus (acquired), right foot    TECHNIQUE:  AP, lateral, and oblique views of both feet were performed.    COMPARISON:  None    FINDINGS:  Right: There is no radiographic evidence of acute osseous, articular, or soft tissue abnormality.  There is mild hallux valgus deformity with mild associated degenerative changes at the great toe MPT joint.  No erosive changes demonstrated.    Left: There is no radiographic evidence of acute osseous, articular, or soft tissue abnormality.  There is mild hallux valgus with mild-to-moderate degenerative findings present at the great toe MTP joint.No erosive changes demonstrated.      Impression       As above.  No acute findings.      Electronically signed by: Ulises Dunne MD  Date: 04/29/2019  Time: 10:51             Last Resulted: 04/29/19 10:51                LOWER EXTREMITY PHYSICAL EXAMINATION    NEUROLOGY: Proprioception is intact. Sensation to light touch is intact. Lisa-incisional sensation is intact.    VASCULAR: On the left and right foot, the dorsalis pedis pulse is 2/4 and the posterior tibial pulse is 1/4. Capillary refill time is less than 3 seconds. Hair growth is present on the dorsum of the foot and at the digits. No rubor is present. Proximal to distal temperature is warm to warm.     DERMATOLOGY: Skin is supple, dry and intact. No ecchymosis is noted. No hypertrophic skin formation. No erythema or cellulitis is noted. The incision site that had delayed closure the left lower extremity is healed and resolved.  There are no local signs infection.      ORTHOPEDIC:  Mild edema is noted, left great toe joint.  Anatomic  and rectus alignment of the 1st metatarsophalangeal joint is noted on the left.  Concerning the contralateral limb, there is a mild bunion deformity is noted. Upon ROM in the sagittal plan, there is mild pains to the end ROM, dorsally; no plantar pains at the 1st MTPJ are noted. No pains to palpation of the tibial or the fibular sesamoid. There is a small to moderately sized medial eminence appreciated. There is minimal dorsal spurring noted. Palpation of the dorsal-lateral aspect of the 1st MTPJ is slightly painful. Hallux abductus is noted.  Hallux interphalangeus is not noted. There is tracking. The hallux is trackbound. There is no hypermobility noted at the 1st TMTJ. Upon reduction of the IM angle at the 1st metatarsal head, the hallux is rectus.     Assessment:     1. Delayed surgical wound healing, subsequent encounter    2. Foot pain, left    3. Hallux valgus (acquired), left foot    4. Controlled type 2 diabetes mellitus without complication, without long-term current use of insulin    5. Severe obesity (BMI 35.0-39.9) with comorbidity    6. Hallux valgus (acquired), right foot    7. Pain in right foot          Plan:     Delayed surgical wound healing, subsequent encounter  Foot pain, left  Hallux valgus (acquired), left foot  Resolved symptomology.  Patient may continue to weight bear and ambulate with normal shoe gear.  Range of motion exercises are to be started.    Controlled type 2 diabetes mellitus without complication, without long-term current use of insulin  Hallux valgus (acquired), right foot  Pain in right foot  -     Comprehensive metabolic panel; Future; Expected date: 06/18/2019  -     CBC Without Differential; Future; Expected date: 06/18/2019  -     Hemoglobin A1c; Future; Expected date: 06/18/2019    Thorough discussion is had with the patient today, concerning the diagnosis, its etiology, and the treatment algorithm at present.  XRAYS are reviewed in detail with the patient. All questions  and concerns regarding findings and its/their implications are outlined and discussed.  Most recent labs reviewed in detail with the patient. All questions and concerns regarding findings and its/their implications are outlined and discussed.  Patient's past medical history is thoroughly reviewed today, in light of the fact that surgical intervention is discussed/planned/initiated.    The procedure of (bunionectomy, right great toe) was thoroughly explained to the patient. Its necessity and/or purpose and the implications therein were outlined, including any pertinent advantages and/or disadvantages, and possible complications, if any. Possible complications include recurrence of pathology and/or deformity, infection (cellulitis, drainage, purulence, malodor, etc...), pain, numbness, neuritis, edema, burning, loss of function, need for further surgery, possible need for removal of any implanted hardware, soft tissue contracture and/or scarring, etc... No guarantees were given and/or implied. Post-operative expectations and weightbearing protocol is thoroughly explained the patient, who acknowledges understanding. Rx. is given for pre-operative labs and for medical clearance by patient's PMD.       Severe obesity (BMI 35.0-39.9) with comorbidity  Patient is counseled and reminded concerning the importance of good nutrition and healthy eating habits, which may include blood sugar control, to prevent and/or help podiatric foot and ankle complications.          Future Appointments   Date Time Provider Department Center   7/10/2019  1:45 PM [facility] XR2 [facility] [place]   7/10/2019  2:00 PM PULMONARY LAB, [facility] VIPIN [place]   7/10/2019  2:20 PM Nixon Mcdermott MD [facility]

## 2019-06-18 NOTE — PROGRESS NOTES
Subjective:       Patient ID: Cassadnra Campos is a 68 y.o. female.    Chief Complaint: Wound Check (Left wound check, pain 0/10,ambulation withotu ephraim, wear post-op, diabetic, PCP Dr. Maldonado)    HPI:  Cassandra Campos presents to the office today, s/p 5/10/19 Nhan bunionectomy, left foot w/ AKIN phalangeal osteotomy, left foot. Patient presents weight-bearing as tolerated with sneaker. Patient states tolerable pains at approximately 0/10 on the right lower extremity.  Patient states the wound has long since healed.  Patient is a well controlled DMII without complications. Denia Maldonado MD is her primary care provider.  At this time, patient is interested in surgical intervention on the contralateral limb.  She states pains due to bunion deformity.    Hemoglobin A1C   Date Value Ref Range Status   02/19/2019 6.1 (H) 4.0 - 5.6 % Final     Comment:     ADA Screening Guidelines:  5.7-6.4%  Consistent with prediabetes  >or=6.5%  Consistent with diabetes  High levels of fetal hemoglobin interfere with the HbA1C  assay. Heterozygous hemoglobin variants (HbS, HgC, etc)do  not significantly interfere with this assay.   However, presence of multiple variants may affect accuracy.     08/14/2018 5.7 (H) 4.0 - 5.6 % Final     Comment:     ADA Screening Guidelines:  5.7-6.4%  Consistent with prediabetes  >or=6.5%  Consistent with diabetes  High levels of fetal hemoglobin interfere with the HbA1C  assay. Heterozygous hemoglobin variants (HbS, HgC, etc)do  not significantly interfere with this assay.   However, presence of multiple variants may affect accuracy.     02/12/2018 5.3 4.0 - 5.6 % Final     Comment:     According to ADA guidelines, hemoglobin A1c <7.0% represents  optimal control in non-pregnant diabetic patients. Different  metrics may apply to specific patient populations.   Standards of Medical Care in Diabetes-2016.  For the purpose of screening for the presence of diabetes:  <5.7%     Consistent with  the absence of diabetes  5.7-6.4%  Consistent with increasing risk for diabetes   (prediabetes)  >or=6.5%  Consistent with diabetes  Currently, no consensus exists for use of hemoglobin A1c  for diagnosis of diabetes for children.  This Hemoglobin A1c assay has significant interference with fetal   hemoglobin   (HbF). The results are invalid for patients with abnormal amounts of   HbF,   including those with known Hereditary Persistence   of Fetal Hemoglobin. Heterozygous hemoglobin variants (HbAS, HbAC,   HbAD, HbAE, HbA2) do not significantly interfere with this assay;   however, presence of multiple variants in a sample may impact the %   interference.           Review of patient's allergies indicates:  No Known Allergies    Past Medical History:   Diagnosis Date    Arthritis     hands    Borderline glaucoma     Gastritis     upper GI 2/2017    Hydradenitis     Hyperlipidemia     Hypertension     Insomnia     Migraines 02/01/2000    Morbid obesity due to excess calories 11/1/2016    Nasal septum perforation     Obesity     Pneumonia     Restrictive airway disease     Sleep apnea     SVT (supraventricular tachycardia) 09/2013    Type 2 diabetes mellitus     Type II or unspecified type diabetes mellitus without mention of complication, not stated as uncontrolled 05/20/2013       Family History   Problem Relation Age of Onset    Prostate cancer Brother     Diabetes Maternal Aunt        Social History     Socioeconomic History    Marital status:      Spouse name: Not on file    Number of children: 1    Years of education: Not on file    Highest education level: Not on file   Occupational History    Occupation:  aid   Social Needs    Financial resource strain: Not on file    Food insecurity:     Worry: Not on file     Inability: Not on file    Transportation needs:     Medical: Not on file     Non-medical: Not on file   Tobacco Use    Smoking status: Former Smoker      Packs/day: 0.50     Years: 30.00     Pack years: 15.00     Types: Cigarettes     Start date: 1970     Last attempt to quit: 2012     Years since quittin.4    Smokeless tobacco: Never Used   Substance and Sexual Activity    Alcohol use: Yes     Comment: rarely // wine  No alcohol 72 HOURS prior to surgery    Drug use: No    Sexual activity: Not Currently     Partners: Male   Lifestyle    Physical activity:     Days per week: Not on file     Minutes per session: Not on file    Stress: Not on file   Relationships    Social connections:     Talks on phone: Not on file     Gets together: Not on file     Attends Sabianism service: Not on file     Active member of club or organization: Not on file     Attends meetings of clubs or organizations: Not on file     Relationship status: Not on file   Other Topics Concern    Not on file   Social History Narrative    Single, part-time teacher. Masters degree biology.        Past Surgical History:   Procedure Laterality Date    ARTHROGRAM Left 2016    Performed by [facility] Sr[facility] at [facility] OR    ARTHROSCOPY-KNEE Right 2017    Performed by [facility] at [facility]    ARTHROSCOPY-KNEE W/ CHONDROPLASTY Right 2017    Performed by [facility] at [facility] OR    ARTHROSCOPY-MENISCECTOMY Right 2017    Performed by [facility] at [facility] OR    AXILLARY HIDRADENITIS EXCISION      BREAST BIOPSY Bilateral     BREAST LUMPECTOMY Bilateral 1998    Benign    BUNIONECTOMY, WITH METATARSAL OSTEOTOMY Left 5/10/2019    Performed by Srinivasan Villanueva DPM at [facility]    BURSECTOMY, OLECRANON Right 2018    Performed by [facility] at [facility] OR    CARPAL TUNNEL RELEASE      bilateral    CATARACT EXTRACTION Bilateral     OU     SECTION  1979    CHOLECYSTECTOMY  2014    CYST REMOVAL  2015    sebaceous cyst removed from face    ESOPHAGOGASTRODUODENOSCOPY (EGD) N/A 2017    Performed by  "R[facility]ad Watson MD at [facility] ENDO    EXCISION, EXOSTOSIS Right 7/25/2018    Performed by [facility] Sr[facility] at [facility] OR    gastric sleeve  02/13/2017    Dr. Watson    KNEE SURGERY Right     MICROFRACTURE Right 6/16/2017    Performed by [facility] Sr[facility] at [facility] OR    RELEASE-FINGER-TRIGGER Right 6/16/2017    Performed by Jayro Pedraza Sr., MD at [facility] OR    RELEASE-FINGER-TRIGGER Right 4/1/2015    Performed by Jayro [facility]raza Sr., [facility] at [facility] OR    SYNOVECTOMY-KNEE Right 6/16/2017    Performed by Jayro [facility] Sr[facility] at [facility]    TENOSYNOVECTOMY Right 6/16/2017    Performed by [facility] Sr[facility] at [facility] OR    TENOSYNOVECTOMY Right 4/1/2015    Performed by [facility] Sr[facility] at [facility] OR    TONSILLECTOMY, ADENOIDECTOMY  1980s    TRIGGER FINGER RELEASE Right april 1, 2015    Dr. Pedraza    XI ROBOT ASSISTED LAPAROSCOPIC SLEEVE-GASTRECTOMY N/A 2/13/2017    Performed by R[facility]ad Watson MD at [facility] OR       Review of Systems   Constitutional: Negative for chills, fatigue and fever.   HENT: Negative for hearing loss.    Eyes: Negative for photophobia and visual disturbance.   Respiratory: Negative for cough, chest tightness, shortness of breath and wheezing.    Cardiovascular: Negative for chest pain and palpitations.   Gastrointestinal: Negative for constipation, diarrhea, nausea and vomiting.   Endocrine: Negative for cold intolerance and heat intolerance.   Genitourinary: Negative for flank pain.   Musculoskeletal: Positive for arthralgias and gait problem. Negative for neck pain and neck stiffness.   Neurological: Negative for light-headedness, numbness and headaches.   Psychiatric/Behavioral: Negative for sleep disturbance.          Objective:   BP (!) 143/81 (BP Location: Right arm, Patient Position: Sitting, BP Method: Medium (Automatic))   Pulse (!) 112   Resp 17   Ht 5' 3" (1.6 m)   Wt 93.4 kg (205 lb 14.6 oz)   BMI 36.48 kg/m²     X-Ray Foot Complete Bilateral   Order: [id number]   Status:  Final " result   Visible to patient:  Yes (Patient Portal) Next appt:  07/10/2019 at 01:45 PM in Radiology ([facility] XR2) Dx:  Hallux valgus (acquired), right foot;...   Details     Reading Physician Reading Date Result Priority   Ulises Dunne MD 4/29/2019       Narrative     EXAMINATION:  XR FOOT COMPLETE 3 VIEW BILATERAL    CLINICAL HISTORY:  Hallux valgus (acquired), right foot    TECHNIQUE:  AP, lateral, and oblique views of both feet were performed.    COMPARISON:  None    FINDINGS:  Right: There is no radiographic evidence of acute osseous, articular, or soft tissue abnormality.  There is mild hallux valgus deformity with mild associated degenerative changes at the great toe MPT joint.  No erosive changes demonstrated.    Left: There is no radiographic evidence of acute osseous, articular, or soft tissue abnormality.  There is mild hallux valgus with mild-to-moderate degenerative findings present at the great toe MTP joint.No erosive changes demonstrated.      Impression       As above.  No acute findings.      Electronically signed by: Ulises Dunne MD  Date: 04/29/2019  Time: 10:51             Last Resulted: 04/29/19 10:51                LOWER EXTREMITY PHYSICAL EXAMINATION    NEUROLOGY: Proprioception is intact. Sensation to light touch is intact. Lisa-incisional sensation is intact.    VASCULAR: On the left and right foot, the dorsalis pedis pulse is 2/4 and the posterior tibial pulse is 1/4. Capillary refill time is less than 3 seconds. Hair growth is present on the dorsum of the foot and at the digits. No rubor is present. Proximal to distal temperature is warm to warm.     DERMATOLOGY: Skin is supple, dry and intact. No ecchymosis is noted. No hypertrophic skin formation. No erythema or cellulitis is noted. The incision site that had delayed closure the left lower extremity is healed and resolved.  There are no local signs infection.      ORTHOPEDIC:  Mild edema is noted, left great toe joint.  Anatomic  and rectus alignment of the 1st metatarsophalangeal joint is noted on the left.  Concerning the contralateral limb, there is a mild bunion deformity is noted. Upon ROM in the sagittal plan, there is mild pains to the end ROM, dorsally; no plantar pains at the 1st MTPJ are noted. No pains to palpation of the tibial or the fibular sesamoid. There is a small to moderately sized medial eminence appreciated. There is minimal dorsal spurring noted. Palpation of the dorsal-lateral aspect of the 1st MTPJ is slightly painful. Hallux abductus is noted.  Hallux interphalangeus is not noted. There is tracking. The hallux is trackbound. There is no hypermobility noted at the 1st TMTJ. Upon reduction of the IM angle at the 1st metatarsal head, the hallux is rectus.     Assessment:     1. Delayed surgical wound healing, subsequent encounter    2. Foot pain, left    3. Hallux valgus (acquired), left foot    4. Controlled type 2 diabetes mellitus without complication, without long-term current use of insulin    5. Severe obesity (BMI 35.0-39.9) with comorbidity    6. Hallux valgus (acquired), right foot    7. Pain in right foot          Plan:     Delayed surgical wound healing, subsequent encounter  Foot pain, left  Hallux valgus (acquired), left foot  Resolved symptomology.  Patient may continue to weight bear and ambulate with normal shoe gear.  Range of motion exercises are to be started.    Controlled type 2 diabetes mellitus without complication, without long-term current use of insulin  Hallux valgus (acquired), right foot  Pain in right foot  -     Comprehensive metabolic panel; Future; Expected date: 06/18/2019  -     CBC Without Differential; Future; Expected date: 06/18/2019  -     Hemoglobin A1c; Future; Expected date: 06/18/2019    Thorough discussion is had with the patient today, concerning the diagnosis, its etiology, and the treatment algorithm at present.  XRAYS are reviewed in detail with the patient. All questions  and concerns regarding findings and its/their implications are outlined and discussed.  Most recent labs reviewed in detail with the patient. All questions and concerns regarding findings and its/their implications are outlined and discussed.  Patient's past medical history is thoroughly reviewed today, in light of the fact that surgical intervention is discussed/planned/initiated.    The procedure of (bunionectomy, right great toe) was thoroughly explained to the patient. Its necessity and/or purpose and the implications therein were outlined, including any pertinent advantages and/or disadvantages, and possible complications, if any. Possible complications include recurrence of pathology and/or deformity, infection (cellulitis, drainage, purulence, malodor, etc...), pain, numbness, neuritis, edema, burning, loss of function, need for further surgery, possible need for removal of any implanted hardware, soft tissue contracture and/or scarring, etc... No guarantees were given and/or implied. Post-operative expectations and weightbearing protocol is thoroughly explained the patient, who acknowledges understanding. Rx. is given for pre-operative labs and for medical clearance by patient's PMD.       Severe obesity (BMI 35.0-39.9) with comorbidity  Patient is counseled and reminded concerning the importance of good nutrition and healthy eating habits, which may include blood sugar control, to prevent and/or help podiatric foot and ankle complications.          Future Appointments   Date Time Provider Department Center   7/10/2019  1:45 PM [facility] XR2 [facility] XRAY [place]   7/10/2019  2:00 PM PULMONARY LAB, [facility] VIPIN [place]   7/10/2019  2:20 PM Nixon Mcdermott MD [facility]

## 2019-06-20 NOTE — PRE-PROCEDURE INSTRUCTIONS
Pre op instructions reviewed with patient per phone.     To confirm, Your surgeon has instructed you:  Surgery is scheduled 06/28/19 at 1320  Pre admit office to call afternoon prior to surgery with final arrival time.    [facility] main lobby by 1145.      INSTRUCTIONS IMPORTANT!!!  ¨ No smoking after 12 midnight, the night before surgery.  ¨ No solid food after 12 midnight, but you may have clear liquids up until 3 hours prior to surgery.  This includes: grape, cranberry, and apple juice (not orange, and no coffee.)   ¨ OK to brush teeth, but no gum, candy or mints!    ¨ Take only these medicines with a small swallow of water-morning of surgery.  N/A  ____  Do not wear makeup, including mascara.  ____  No powder, lotions or creams to surgical area.  ____  Please remove all jewelry, including piercings and leave at home.  ____  No money or valuables needed. Please leave at home.  ____  Please bring identification and insurance information to hospital.  ____  If going home the same day, arrange for a ride home. You will not be able to   drive if Anesthesia was used.  ____  Children, under 12 years old, must remain in the waiting room with an adult.  They are not allowed in patient areas.  ____  Wear loose fitting clothing. Allow for dressings, bandages.  ____  Stop Aspirin, Ibuprofen, Motrin and Aleve at least 5-7 days before surgery, unless otherwise instructed by your doctor, or the nurse.   You MAY use Tylenol/acetaminophen until day of surgery.  ____  If you take diabetic medication, do not take am of surgery unless instructed by   Doctor.  ____ Stop taking any Fish Oil supplement or any Vitamins that contain Vitamin E at least 5 days prior to surgery.          Bathing Instructions-- The night before surgery and the morning prior to coming to the hospital:   -Do not shave the surgical area.   -Shower and wash your hair and body as usual with your regular soap  and shampoo.   -Rinse your hair and body completely.   -Use one packet of hibiclens to wash the surgical site (using your hand) gently for 5 minutes.  Do not scrub you skin too hard.   -Do not use hibiclens on your head, face, or genitals.   -Do not wash with regular soap after you use the hibiclens.   -Rinse your body thoroughly.   -Dry with clean, soft towel.  Do not use lotion, cream, deodorant, or powders on   the surgical site.    Use antibacterial soap in place of hibiclens if your surgery is on the head, face or genitals.         Surgical Site Infection    Prevention of surgical site infections:     -Keep incisions clean and dry.   -Do not soak/submerge incisions in water until completely healed.   -Do not apply lotions, powders, creams, or deodorants to site.   -Always make sure hands are cleaned with antibacterial soap/ alcohol-based   prior to touching the surgical site.  (This includes doctors, nurses, staff, and yourself.)    Signs and symptoms:   -Redness and pain around the area where you had surgery   -Drainage of cloudy fluid from your surgical wound   -Fever over 100.4  I have read or had read and explained to me, and understand the above information.

## 2019-06-21 ENCOUNTER — PATIENT MESSAGE (OUTPATIENT)
Dept: INTERNAL MEDICINE | Facility: CLINIC | Age: 69
End: 2019-06-21

## 2019-06-21 ENCOUNTER — OFFICE VISIT (OUTPATIENT)
Dept: INTERNAL MEDICINE | Facility: CLINIC | Age: 69
End: 2019-06-21
Payer: MEDICARE

## 2019-06-21 VITALS
TEMPERATURE: 97 F | HEIGHT: 63 IN | OXYGEN SATURATION: 98 % | BODY MASS INDEX: 36.17 KG/M2 | DIASTOLIC BLOOD PRESSURE: 61 MMHG | SYSTOLIC BLOOD PRESSURE: 115 MMHG | WEIGHT: 204.13 LBS | HEART RATE: 92 BPM

## 2019-06-21 DIAGNOSIS — I10 ESSENTIAL HYPERTENSION: ICD-10-CM

## 2019-06-21 DIAGNOSIS — R91.1 INCIDENTAL LUNG NODULE, GREATER THAN OR EQUAL TO 8MM: ICD-10-CM

## 2019-06-21 DIAGNOSIS — E78.5 HYPERLIPIDEMIA ASSOCIATED WITH TYPE 2 DIABETES MELLITUS: ICD-10-CM

## 2019-06-21 DIAGNOSIS — M21.611 BUNION OF RIGHT FOOT: ICD-10-CM

## 2019-06-21 DIAGNOSIS — J84.10 CALCIFIED GRANULOMA OF LUNG: ICD-10-CM

## 2019-06-21 DIAGNOSIS — E11.69 HYPERLIPIDEMIA ASSOCIATED WITH TYPE 2 DIABETES MELLITUS: ICD-10-CM

## 2019-06-21 DIAGNOSIS — E11.9 CONTROLLED TYPE 2 DIABETES MELLITUS WITHOUT COMPLICATION, WITHOUT LONG-TERM CURRENT USE OF INSULIN: Primary | ICD-10-CM

## 2019-06-21 DIAGNOSIS — M79.671 RIGHT FOOT PAIN: ICD-10-CM

## 2019-06-21 PROCEDURE — 99999 PR PBB SHADOW E&M-EST. PATIENT-LVL III: ICD-10-PCS | Mod: PBBFAC,HCNC,, | Performed by: FAMILY MEDICINE

## 2019-06-21 PROCEDURE — 3074F PR MOST RECENT SYSTOLIC BLOOD PRESSURE < 130 MM HG: ICD-10-PCS | Mod: HCNC,CPTII,S$GLB, | Performed by: FAMILY MEDICINE

## 2019-06-21 PROCEDURE — 99499 RISK ADDL DX/OHS AUDIT: ICD-10-PCS | Mod: HCNC,S$GLB,, | Performed by: FAMILY MEDICINE

## 2019-06-21 PROCEDURE — 3078F PR MOST RECENT DIASTOLIC BLOOD PRESSURE < 80 MM HG: ICD-10-PCS | Mod: HCNC,CPTII,S$GLB, | Performed by: FAMILY MEDICINE

## 2019-06-21 PROCEDURE — 99499 UNLISTED E&M SERVICE: CPT | Mod: HCNC,S$GLB,, | Performed by: FAMILY MEDICINE

## 2019-06-21 PROCEDURE — 3044F PR MOST RECENT HEMOGLOBIN A1C LEVEL <7.0%: ICD-10-PCS | Mod: HCNC,CPTII,S$GLB, | Performed by: FAMILY MEDICINE

## 2019-06-21 PROCEDURE — 3074F SYST BP LT 130 MM HG: CPT | Mod: HCNC,CPTII,S$GLB, | Performed by: FAMILY MEDICINE

## 2019-06-21 PROCEDURE — 99214 PR OFFICE/OUTPT VISIT, EST, LEVL IV, 30-39 MIN: ICD-10-PCS | Mod: HCNC,S$GLB,, | Performed by: FAMILY MEDICINE

## 2019-06-21 PROCEDURE — 99999 PR PBB SHADOW E&M-EST. PATIENT-LVL III: CPT | Mod: PBBFAC,HCNC,, | Performed by: FAMILY MEDICINE

## 2019-06-21 PROCEDURE — 1101F PT FALLS ASSESS-DOCD LE1/YR: CPT | Mod: HCNC,CPTII,S$GLB, | Performed by: FAMILY MEDICINE

## 2019-06-21 PROCEDURE — 99214 OFFICE O/P EST MOD 30 MIN: CPT | Mod: HCNC,S$GLB,, | Performed by: FAMILY MEDICINE

## 2019-06-21 PROCEDURE — 3078F DIAST BP <80 MM HG: CPT | Mod: HCNC,CPTII,S$GLB, | Performed by: FAMILY MEDICINE

## 2019-06-21 PROCEDURE — 1101F PR PT FALLS ASSESS DOC 0-1 FALLS W/OUT INJ PAST YR: ICD-10-PCS | Mod: HCNC,CPTII,S$GLB, | Performed by: FAMILY MEDICINE

## 2019-06-21 PROCEDURE — 3044F HG A1C LEVEL LT 7.0%: CPT | Mod: HCNC,CPTII,S$GLB, | Performed by: FAMILY MEDICINE

## 2019-06-21 RX ORDER — TRAMADOL HYDROCHLORIDE 50 MG/1
50 TABLET ORAL EVERY 8 HOURS PRN
Refills: 0 | COMMUNITY
Start: 2019-06-04 | End: 2019-06-21

## 2019-06-21 NOTE — LETTER
June 23, 2019    Cassandra Campos  [address]  [place] [address]             [address] [address]  Phone: [phone]  Fax: [phone] Srinivasan Villanueva DPM:    Please see preoperative evaluation 06/21/2019.  Patient with acceptable risk for proposed procedure.  There are no contraindications to proposed procedure.      Denia Maldonado MD

## 2019-06-21 NOTE — H&P (VIEW-ONLY)
Subjective:       Patient ID: Cassandra Campos is a 68 y.o. female.    Chief Complaint: Pre-op Exam    Patient with controlled type 2 diabetes, controlled hypertension, hyperlipidemia controlled on meds, here for preop eval with recent labs.  Dr. Villanueva to perform right bunionectomy with metatarsal osteotomy 06/28/2019.  He has obtained A1c, CBC, CMP.       EKG reviewed from April 2018.  Chest x-ray report 03/06/2019 reviewed.  Medical clearance is being requested by Dr. Villanueva.  She had a left bunionectomy 05/10/2019.  This is to be on the right.    Component                Value               Date                       WBC                      7.39                06/18/2019                 HGB                      13.7                06/18/2019                 HCT                      43.3                06/18/2019                 PLT                      273                 06/18/2019                 CHOL                     132                 08/14/2018                 TRIG                     180 (H)             08/14/2018                 HDL                      35 (L)              08/14/2018                 LDLCALC                  61.0 (L)            08/14/2018                 ALT                      37                  06/18/2019                 AST                      35                  06/18/2019                 NA                       141                 06/18/2019                 K                        4.0                 06/18/2019                 CL                       104                 06/18/2019                 CALCIUM                  10.4                06/18/2019                 CREATININE               0.8                 06/18/2019                 BUN                      12                  06/18/2019                 CO2                      25                  06/18/2019                 TSH                      2.386               11/28/2016                 GLU                       126 (H)             06/18/2019                 ESTGFRAFRICA             >60.0               06/18/2019                 EGFRNONAA                >60.0               06/18/2019                 HGBA1C                   6.2 (H)             06/18/2019                 MICALBCREAT              9.2                 08/14/2018              Review of Systems   Constitutional: Negative for activity change and unexpected weight change.   HENT: Positive for hearing loss. Negative for congestion, rhinorrhea, sore throat and trouble swallowing.    Eyes: Negative for discharge and visual disturbance.   Respiratory: Positive for shortness of breath (just with rushing). Negative for cough, chest tightness and wheezing.    Cardiovascular: Positive for palpitations (occas brief asymptomatic palpitations - currently rare). Negative for chest pain.   Gastrointestinal: Positive for abdominal pain (occas epigastric pain lately - at night - eats near bedtime). Negative for blood in stool, constipation, diarrhea and vomiting.   Endocrine: Negative for polyuria.   Genitourinary: Positive for difficulty urinating (has occas hesitancy). Negative for dysuria, hematuria, menstrual problem and urgency.   Musculoskeletal: Negative for arthralgias, back pain, joint swelling and neck pain.   Neurological: Negative for weakness and headaches.   Psychiatric/Behavioral: Negative for confusion, dysphoric mood and sleep disturbance.       Objective:      Physical Exam   Constitutional: She is oriented to person, place, and time. She appears well-developed and well-nourished.   HENT:   Head: Normocephalic and atraumatic.   Right Ear: Tympanic membrane, external ear and ear canal normal.   Left Ear: Tympanic membrane, external ear and ear canal normal.   Nose: Nose normal.   Mouth/Throat: Oropharynx is clear and moist. No oropharyngeal exudate.   Eyes: Conjunctivae and EOM are normal.   Neck: Normal range of motion. Neck supple. No thyromegaly present.  "  Cardiovascular: Normal rate, regular rhythm and normal heart sounds. Exam reveals no gallop and no friction rub.   No murmur heard.  Pulmonary/Chest: Effort normal and breath sounds normal. She exhibits no tenderness.   Abdominal: Soft. She exhibits no distension and no mass. There is tenderness (mild epigastric TTP). There is no rebound and no guarding.   Musculoskeletal: She exhibits no edema.   Lymphadenopathy:     She has no cervical adenopathy.   Neurological: She is alert and oriented to person, place, and time.   Skin: Skin is warm and dry.   Psychiatric: She has a normal mood and affect. Her behavior is normal.         Assessment/Plan:     1. Controlled type 2 diabetes mellitus without complication, without long-term current use of insulin     2. Hyperlipidemia associated with type 2 diabetes mellitus  CANCELED: Comprehensive metabolic panel   3. Essential hypertension     4. Right foot pain     5. Bunion of right foot     6. Calcified granuloma of lung     7. Incidental lung nodule, greater than or equal to 8mm     shingrix info given - talk to pharmacy.  Removed CMP and CBC from August orders - she is to go ahead with other labs and DM2 recheck at that time.  Needs to see cards for annual - will assist scheduling. F/U with pulmonary.  Preoperative evlauation:  She has controlled DM2, controlled HTN, DANIEL, restrictive airway disease.  She has no contraindications to proposed procedure, performed under MAC, with attn to history DANIEL dx during procedure and during recovery.  Acceptable risk for proposed procedure and did well last month with same procedure on the opposite limb.  Letter sent to Dr Villanueva.    Addendum 6/23/19: Scattered calcified granulomas noted present noted on 3/10/15 CXR, with stability noted on further CXRs. CT chest 10/26/15 found 11 mm noncalcified nodule is identified in the posterior left lower lobe which contacts the pleural surface. Subsequent PET 11/6/19 "No FDG avidity associated " "with the pulmonary nodules noted within the left lower lobe.  Continued followup with CT is recommended." she is following with pulmonary, advised to keep/make appt with pulmonary.    CXR, EKG, recent labs reviewed. Pt with hx restrictive lung ds. occas use of albuterol. She has no current lung c/o. Had similar sx 2 months ago, performed via MAC.  She has average risk for planned procedure.  She is advised not to take metformin evening prior to surgery and restart 48 hrs later. OK to take her BB the evening before and all other meds.    "

## 2019-06-21 NOTE — TELEPHONE ENCOUNTER
Spoke to patient and was advised that she has mis placed her prescription for pravastatin (PRAVACHOL) 40 MG tablet.  Patient then stated that she was advised by the pharmacy that she picked up a 90 day supply back in may.  Patient stated that she does not know where she put the prescription.  I advised the patient that she can contact her pharmacy and advise the pharmacy that she has misplaced her prescription and they can refill it for her, but she will have to pay for it.  Patient verbally understood.

## 2019-06-21 NOTE — LETTER
June 23, 2019    Cassandra Campos  [address] [place] [address]             [facility]  [address] [facility] [address]  Phone: [phone]  Fax: [phone] Srinivasan Villanueva DPM:    Re preoperative clearance:    Ms Cassandra Campos has controlled DM2, controlled HTN, DANIEL, restrictive airway disease.    She has no contraindications to proposed procedure, performed under MAC, with attn to history DANIEL dx during procedure and during recovery.    Acceptable risk for proposed procedure and did well last month with same procedure on the opposite limb.    See her visit 6/21/19 for full report.        Denia Maldonado MD

## 2019-06-25 ENCOUNTER — PATIENT MESSAGE (OUTPATIENT)
Dept: PULMONOLOGY | Facility: CLINIC | Age: 69
End: 2019-06-25

## 2019-06-25 NOTE — DISCHARGE INSTRUCTIONS
Weightbearing Status and Wound care:  1. When walking, wear the surgical shoe at all times.  2. When walking with the surgical shoe, place the majority of the weight on the lateral of the foot (towards the 5th toe) and the back of the foot (towards the heel).  3. During daytime/awake hours, if a CAST or POSTERIOR SPLINT is in place, ice the posterior aspect of the leg, behind the knee, 20 minutes on (w/ ice) and followed by 20 minutes off (w/o ice) until follow up; repeat accordingly until follow up. IF no CAST or POSTERIOR SPLINT is in place, ice the anterior aspect of the ankle, in a similar manner. During night time/sleep hours, simply elevating the limb above the level of the heart is sufficient.   4. Elevate the extremity above the level of the heart at all times when sitting.  5. Take the pain mediations as prescribed for the initial 3 or so days, then only as needed.  6. If no overt contra-indication, take Motrin or Advil or Ibuprofen or Aleve in between the pain medication doses.  7. Do not change the dressings.  8. Do not get the dressings wet.

## 2019-06-26 ENCOUNTER — PATIENT MESSAGE (OUTPATIENT)
Dept: SURGERY | Facility: HOSPITAL | Age: 69
End: 2019-06-26

## 2019-06-28 ENCOUNTER — ANESTHESIA EVENT (OUTPATIENT)
Dept: SURGERY | Facility: HOSPITAL | Age: 69
End: 2019-06-28
Payer: MEDICARE

## 2019-06-28 ENCOUNTER — ANESTHESIA (OUTPATIENT)
Dept: SURGERY | Facility: HOSPITAL | Age: 69
End: 2019-06-28
Payer: MEDICARE

## 2019-06-28 ENCOUNTER — HOSPITAL ENCOUNTER (OUTPATIENT)
Facility: HOSPITAL | Age: 69
Discharge: HOME | End: 2019-06-28
Attending: PODIATRIST | Admitting: PODIATRIST
Payer: MEDICARE

## 2019-06-28 VITALS
BODY MASS INDEX: 36.17 KG/M2 | OXYGEN SATURATION: 97 % | SYSTOLIC BLOOD PRESSURE: 118 MMHG | RESPIRATION RATE: 20 BRPM | DIASTOLIC BLOOD PRESSURE: 77 MMHG | TEMPERATURE: 98 F | HEIGHT: 63 IN | HEART RATE: 89 BPM | WEIGHT: 204.13 LBS

## 2019-06-28 LAB
POCT GLUCOSE: 135 MG/DL (ref 70–110)
POCT GLUCOSE: 140 MG/DL (ref 70–110)

## 2019-06-28 PROCEDURE — 25000003 PHARM REV CODE 250: Mod: HCNC | Performed by: PODIATRIST

## 2019-06-28 PROCEDURE — 36000707: Mod: HCNC | Performed by: PODIATRIST

## 2019-06-28 PROCEDURE — 63600175 PHARM REV CODE 636 W HCPCS: Mod: HCNC | Performed by: PODIATRIST

## 2019-06-28 PROCEDURE — 27201423 OPTIME MED/SURG SUP & DEVICES STERILE SUPPLY: Mod: HCNC | Performed by: PODIATRIST

## 2019-06-28 PROCEDURE — 63600175 PHARM REV CODE 636 W HCPCS: Mod: HCNC | Performed by: NURSE ANESTHETIST, CERTIFIED REGISTERED

## 2019-06-28 PROCEDURE — C9290 INJ, BUPIVACAINE LIPOSOME: HCPCS | Mod: HCNC | Performed by: PODIATRIST

## 2019-06-28 PROCEDURE — 71000015 HC POSTOP RECOV 1ST HR: Mod: HCNC | Performed by: PODIATRIST

## 2019-06-28 PROCEDURE — 37000009 HC ANESTHESIA EA ADD 15 MINS: Mod: HCNC | Performed by: PODIATRIST

## 2019-06-28 PROCEDURE — S0020 INJECTION, BUPIVICAINE HYDRO: HCPCS | Mod: HCNC | Performed by: PODIATRIST

## 2019-06-28 PROCEDURE — 71000033 HC RECOVERY, INTIAL HOUR: Mod: HCNC | Performed by: PODIATRIST

## 2019-06-28 PROCEDURE — 28296 COR HLX VLGS DSTL MTAR OSTEO: CPT | Mod: T5,HCNC,, | Performed by: PODIATRIST

## 2019-06-28 PROCEDURE — 28296 PR CORRECT BUNION, DISTAL METATARSAL OSTEOTOMY: ICD-10-PCS | Mod: T5,HCNC,, | Performed by: PODIATRIST

## 2019-06-28 PROCEDURE — C1769 GUIDE WIRE: HCPCS | Mod: HCNC | Performed by: PODIATRIST

## 2019-06-28 PROCEDURE — C1713 ANCHOR/SCREW BN/BN,TIS/BN: HCPCS | Mod: HCNC | Performed by: PODIATRIST

## 2019-06-28 PROCEDURE — 37000008 HC ANESTHESIA 1ST 15 MINUTES: Mod: HCNC | Performed by: PODIATRIST

## 2019-06-28 PROCEDURE — 36000706: Mod: HCNC | Performed by: PODIATRIST

## 2019-06-28 PROCEDURE — 25000003 PHARM REV CODE 250: Mod: HCNC | Performed by: NURSE ANESTHETIST, CERTIFIED REGISTERED

## 2019-06-28 DEVICE — IMPLANTABLE DEVICE: Type: IMPLANTABLE DEVICE | Site: FOOT | Status: FUNCTIONAL

## 2019-06-28 RX ORDER — FENTANYL CITRATE 50 UG/ML
INJECTION, SOLUTION INTRAMUSCULAR; INTRAVENOUS
Status: DISCONTINUED | OUTPATIENT
Start: 2019-06-28 | End: 2019-06-28

## 2019-06-28 RX ORDER — SODIUM CHLORIDE 0.9 % (FLUSH) 0.9 %
10 SYRINGE (ML) INJECTION
Status: DISCONTINUED | OUTPATIENT
Start: 2019-06-28 | End: 2019-06-28 | Stop reason: HOSPADM

## 2019-06-28 RX ORDER — ONDANSETRON 2 MG/ML
INJECTION INTRAMUSCULAR; INTRAVENOUS
Status: DISCONTINUED | OUTPATIENT
Start: 2019-06-28 | End: 2019-06-28

## 2019-06-28 RX ORDER — MIDAZOLAM HYDROCHLORIDE 1 MG/ML
INJECTION, SOLUTION INTRAMUSCULAR; INTRAVENOUS
Status: DISCONTINUED | OUTPATIENT
Start: 2019-06-28 | End: 2019-06-28

## 2019-06-28 RX ORDER — SODIUM CHLORIDE, SODIUM LACTATE, POTASSIUM CHLORIDE, CALCIUM CHLORIDE 600; 310; 30; 20 MG/100ML; MG/100ML; MG/100ML; MG/100ML
INJECTION, SOLUTION INTRAVENOUS CONTINUOUS PRN
Status: DISCONTINUED | OUTPATIENT
Start: 2019-06-28 | End: 2019-06-28

## 2019-06-28 RX ORDER — CEFAZOLIN SODIUM 2 G/50ML
2 SOLUTION INTRAVENOUS
Status: COMPLETED | OUTPATIENT
Start: 2019-06-28 | End: 2019-06-28

## 2019-06-28 RX ORDER — CEFADROXIL 500 MG/1
500 CAPSULE ORAL EVERY 12 HOURS
Qty: 10 CAPSULE | Refills: 0 | Status: SHIPPED | OUTPATIENT
Start: 2019-06-28 | End: 2019-07-03

## 2019-06-28 RX ORDER — LIDOCAINE HYDROCHLORIDE AND EPINEPHRINE 10; 10 MG/ML; UG/ML
INJECTION, SOLUTION INFILTRATION; PERINEURAL
Status: DISCONTINUED | OUTPATIENT
Start: 2019-06-28 | End: 2019-06-28 | Stop reason: HOSPADM

## 2019-06-28 RX ORDER — BUPIVACAINE HYDROCHLORIDE 5 MG/ML
INJECTION, SOLUTION EPIDURAL; INTRACAUDAL
Status: DISCONTINUED | OUTPATIENT
Start: 2019-06-28 | End: 2019-06-28 | Stop reason: HOSPADM

## 2019-06-28 RX ORDER — KETOROLAC TROMETHAMINE 30 MG/ML
15 INJECTION, SOLUTION INTRAMUSCULAR; INTRAVENOUS EVERY 8 HOURS PRN
Status: DISCONTINUED | OUTPATIENT
Start: 2019-06-28 | End: 2019-06-28 | Stop reason: HOSPADM

## 2019-06-28 RX ORDER — OXYCODONE HYDROCHLORIDE 5 MG/1
5 TABLET ORAL
Status: DISCONTINUED | OUTPATIENT
Start: 2019-06-28 | End: 2019-06-28 | Stop reason: HOSPADM

## 2019-06-28 RX ORDER — ONDANSETRON 2 MG/ML
4 INJECTION INTRAMUSCULAR; INTRAVENOUS DAILY PRN
Status: DISCONTINUED | OUTPATIENT
Start: 2019-06-28 | End: 2019-06-28 | Stop reason: HOSPADM

## 2019-06-28 RX ORDER — OXYCODONE AND ACETAMINOPHEN 7.5; 325 MG/1; MG/1
1 TABLET ORAL EVERY 6 HOURS PRN
Qty: 28 TABLET | Refills: 0 | Status: SHIPPED | OUTPATIENT
Start: 2019-06-28 | End: 2019-07-05

## 2019-06-28 RX ORDER — PROPOFOL 10 MG/ML
VIAL (ML) INTRAVENOUS
Status: DISCONTINUED | OUTPATIENT
Start: 2019-06-28 | End: 2019-06-28

## 2019-06-28 RX ORDER — FENTANYL CITRATE 50 UG/ML
25 INJECTION, SOLUTION INTRAMUSCULAR; INTRAVENOUS EVERY 5 MIN PRN
Status: DISCONTINUED | OUTPATIENT
Start: 2019-06-28 | End: 2019-06-28 | Stop reason: HOSPADM

## 2019-06-28 RX ORDER — LIDOCAINE HCL/PF 100 MG/5ML
SYRINGE (ML) INTRAVENOUS
Status: DISCONTINUED | OUTPATIENT
Start: 2019-06-28 | End: 2019-06-28

## 2019-06-28 RX ORDER — PROPOFOL 10 MG/ML
VIAL (ML) INTRAVENOUS CONTINUOUS PRN
Status: DISCONTINUED | OUTPATIENT
Start: 2019-06-28 | End: 2019-06-28

## 2019-06-28 RX ADMIN — FENTANYL CITRATE 25 MCG: 50 INJECTION, SOLUTION INTRAMUSCULAR; INTRAVENOUS at 07:06

## 2019-06-28 RX ADMIN — BUPIVACAINE 266 MG: 13.3 INJECTION, SUSPENSION, LIPOSOMAL INFILTRATION at 05:06

## 2019-06-28 RX ADMIN — ONDANSETRON 4 MG: 2 INJECTION, SOLUTION INTRAMUSCULAR; INTRAVENOUS at 07:06

## 2019-06-28 RX ADMIN — CEFAZOLIN SODIUM 2 G: 2 SOLUTION INTRAVENOUS at 07:06

## 2019-06-28 RX ADMIN — LIDOCAINE HYDROCHLORIDE 100 MG: 20 INJECTION, SOLUTION INTRAVENOUS at 07:06

## 2019-06-28 RX ADMIN — MIDAZOLAM 2 MG: 1 INJECTION INTRAMUSCULAR; INTRAVENOUS at 07:06

## 2019-06-28 RX ADMIN — PROPOFOL 50 MCG/KG/MIN: 10 INJECTION, EMULSION INTRAVENOUS at 07:06

## 2019-06-28 RX ADMIN — PROPOFOL 30 MG: 10 INJECTION, EMULSION INTRAVENOUS at 07:06

## 2019-06-28 RX ADMIN — SODIUM CHLORIDE, SODIUM LACTATE, POTASSIUM CHLORIDE, AND CALCIUM CHLORIDE: 600; 310; 30; 20 INJECTION, SOLUTION INTRAVENOUS at 07:06

## 2019-06-28 NOTE — ANESTHESIA POSTPROCEDURE EVALUATION
Anesthesia Post Evaluation    Patient: Cassandra Campos    Procedure(s) Performed: Procedure(s) (LRB):  BUNIONECTOMY, WITH METATARSAL OSTEOTOMY (Right)    Final Anesthesia Type: MAC  Patient location during evaluation: PACU  Patient participation: Yes- Able to Participate  Level of consciousness: awake and alert  Post-procedure vital signs: reviewed and stable  Pain management: adequate  Airway patency: patent  PONV status at discharge: No PONV  Anesthetic complications: no      Cardiovascular status: hemodynamically stable  Respiratory status: spontaneous ventilation  Hydration status: euvolemic  Follow-up not needed.          Vitals Value Taken Time   /71 6/28/2019  8:45 AM   Temp 36.7 °C (98.1 °F) 6/28/2019  5:55 AM   Pulse 98 6/28/2019  8:46 AM   Resp 33 6/28/2019  8:46 AM   SpO2 95 % 6/28/2019  8:46 AM   Vitals shown include unvalidated device data.      No case tracking events are documented in the log.      Pain/Francheska Score: No data recorded

## 2019-06-28 NOTE — OP NOTE
[facility]  Podiatric Medicine & Surgery  Operative Report    SUMMARY     Date of Procedure: 6/28/2019    Procedure: Procedure(s):  BUNIONECTOMY, WITH METATARSAL OSTEOTOMY    Surgeon(s) and Role: Surgeon(s) and Role:     * Srinivasan Villanueva DPM - Primary    Pre-Operative Diagnosis: Pre-Op Diagnosis Codes:     * Hallux valgus (acquired), right foot [M20.11]     * Pain in right foot [M79.671]    Post-Operative Diagnosis: Post-Op Diagnosis Codes:     * Hallux valgus (acquired), right foot [M20.11]     * Pain in right foot [M79.671]    Anesthesia: Local MAC    Technical Procedures Used:   1.  Nhan bunionectomy, right foot.    Description of the Findings of the Procedure: The patient was seen in the Holding Room. The risks, benefits, complications, treatment options, and expected outcomes were discussed with the patient. The risks and potential complications of their problem and purposed treatment include but are not limited to infection, nerve injury, vascular injury, nonunion/malunion/delayed union of the surgical site, persistent pain, potential skin necrosis, deep vein thrombosis, possible pulmonary embolus, complications of the anesthetics and failure of the implant.  The patient concurred with the proposed plan, giving informed consent. The patient is aware that the procedure may be a part of a staged collection of procedures for definitive cure and/or alleviation of symptoms. The site of surgery properly noted/marked. Preoperative intravenous antibiotics are hanging at bedside, and are currently being administered via the heparin lock. The patient was taken to Operating Suite.    The patient is wheeled back to the operative suite on a gurney/stretcher/hospital bed in the supine position. Once in the operative suite, the patient is transferred onto the operative table in the supine position. A TIME-OUT is taken as per protocol to identify the proper patient, procedure to be performed, and  laterality.  The patient is properly positioned on the operating room table for ease of dissection and for any ancillary imaging.  A well-padded tourniquet was applied to the right proximal ankle.  Nursing and ancillary OR staff prepared the patient for the procedure. The patient is adequately sedated by the Attending Anesthesiologist and/or covering CRNA.  Following this, a preoperative regional/local block was given to the proximal first ray in Bazzi block fashion.  Next, the operative limb was rendered sterile using chlorhexidine paint and scrub.  Sterile sheets and drapes were applied thereafter. Another TIME-OUT is taken as per protocol to identify the proper patient, procedure to be performed, and laterality.      The limb is elevated and is exsanguinated with an Esmarch bandage for approximately 2 min.  The ankle tourniquet was inflated to 250mmHg.  Dorsal medial skin incision about the 1st metatarsophalangeal joint with a sharp scalpel blade. Deep dissection with large curved Metzenbaum scissors.  The extensor hallucis longus tendon is retracted laterally.  The capsule was entered with a sharp scalpel blade. The capsule was freed from the metatarsal head as well as the base of the proximal phalanx with a sharp scalpel.  Of note, there are/is no OCD lesions of the 1st met. head.     Following this, lateral release and a lateral capsulotomy is performed in standard fashion.  No adductor tendon transfer performed.  The sesamoids were free with a large McGlamry elevator.  Further soft tissue dissection at the metatarsal head.  The dorsal osteophytes of the metatarsal head are resected with a large bone rongeur.  The dorsal osteophytes to the proximal phalanx were resected in similar fashion.  The medial eminences resected in line with the diaphysis of the 1st metatarsal bone.    Following standard technique, a standard 60 degree Nhan osteotomy was performed.  The capital fragment shifted laterally.  X-ray  confirmed anatomical reduction and alignment of the sesamoids.  The osteotomy was fixated with [place] 3.0 x 26 mm compression headless screw.  Copious irrigation with sterile saline solution.    The medial soft tissues of the 1st metatarsophalangeal joint are plicated and tightened with 2-0 Vicryl suture. Copious irrigation with sterile saline solution.  Following this, deep closure of the wound using 2-0 Vicryl suture. The skin is closed with 3-0 Nylon. A postop block consisting of approximately 15 cc of Exparel was given in a Bazzi block fashion.  The limb is dressed with Xeroform/Adaptic nonadherent, followed by sterile 4 x 4 gauze, a light Vic and an Ace bandage for compression.  The ankle tourniquet deflated and capillary refill time is within normal limits x5.    The anesthesia is weaned. There were no complications to this procedure. Any final necessary imaging to be performed in the PACU if it was not performed here in the OR suite.  The patient is transferred to the [facility] bed/stretcher, [facility]. The patient is transferred to the PACU.    Significant Surgical Tasks Conducted by the Assistant(s), if Applicable: N/A    Complications: * No complications entered in OR log *    Estimated Blood Loss (EBL): Minimal    Implants:   Implant Name Type Inv. Item Serial No.  Lot No. LRB No. Used   KWIRE FIXATION NTHRD 3.0MM - [id number]  KWIRE FIXATION NTHRD 3.0MM  [employer] MOLLY.  Right 2   3.0 x 26mm screw      Right 1       Specimens: * No specimens in log *    Condition: stable    Disposition: PACU - hemodynamically stable.    Attestation: I performed the procedure.

## 2019-06-28 NOTE — TRANSFER OF CARE
"Anesthesia Transfer of Care Note    Patient: Cassandra Campos    Procedure(s) Performed: Procedure(s) (LRB):  BUNIONECTOMY, WITH METATARSAL OSTEOTOMY (Right)    Patient location: PACU    Anesthesia Type: MAC    Transport from OR: Transported from OR on room air with adequate spontaneous ventilation    Post pain: adequate analgesia    Post assessment: no apparent anesthetic complications and tolerated procedure well    Post vital signs: stable    Level of consciousness: awake    Nausea/Vomiting: no nausea/vomiting    Complications: none    Transfer of care protocol was followed      Last vitals:   Visit Vitals  /75 (BP Location: Right arm)   Pulse 95   Temp 36.7 °C (98.1 °F) (Temporal)   Resp 18   Ht 5' 3" (1.6 m)   Wt 92.6 kg (204 lb 2.3 oz)   SpO2 96%   Breastfeeding? No   BMI 36.16 kg/m²     "

## 2019-06-28 NOTE — ANESTHESIA PREPROCEDURE EVALUATION
06/28/2019  Cassandra Campos is a 68 y.o., female.    Anesthesia Evaluation    I have reviewed the Patient Summary Reports.    I have reviewed the Nursing Notes.   I have reviewed the Medications.     Review of Systems  Cardiovascular:   Hypertension Dysrhythmias (Pt reports tachycardia)    Hepatic/GI:   GERD, well controlled    Neurological:   Headaches    Endocrine:   Diabetes, well controlled        Physical Exam  General:  Obesity    Airway/Jaw/Neck:  Airway Findings: Mouth Opening: Normal General Airway Assessment: Adult, Good  Mallampati: III  Improves to II with phonation.  TM Distance: Normal, at least 6 cm      Dental:  Dental Findings: Edentulous   Chest/Lungs:  Chest/Lungs Findings: Clear to auscultation     Heart/Vascular:  Heart Findings: Rate: Normal  Rhythm: Regular Rhythm            Patient Active Problem List   Diagnosis    Hyperlipidemia associated with type 2 diabetes mellitus    Insomnia    Hip arthritis    Obesity (BMI 30.0-34.9)    Paroxysmal SVT (supraventricular tachycardia)    GERD (gastroesophageal reflux disease)    Prepatellar bursitis of right knee    Transient synovitis of right knee    Chondromalacia of right knee    Preop cardiovascular exam    Essential hypertension    Calcified granuloma of lung    Osteopenia    Trigger middle finger of right hand    Onychomycosis of multiple toenails with type 2 diabetes mellitus    Trigger finger    Exostosis    Controlled type 2 diabetes mellitus without complication, without long-term current use of insulin    Secondary hypertension    Preoperative clearance    Bunion, left foot         Anesthesia Plan  Type of Anesthesia, risks & benefits discussed:  Anesthesia Type:  general, MAC  Patient's Preference:   Intra-op Monitoring Plan: standard ASA monitors  Intra-op Monitoring Plan Comments:   Post Op Pain Control  Plan:   Post Op Pain Control Plan Comments:   Induction:   IV  Beta Blocker:  Patient is on a Beta-Blocker and has received one dose within the past 24 hours (No further documentation required).       Informed Consent: Patient understands risks and agrees with Anesthesia plan.  Questions answered.   ASA Score: 3     Day of Surgery Review of History & Physical:  There are no significant changes.          Ready For Surgery From Anesthesia Perspective.     Contains abnormal data Complete PFT with bronchodilator   Order: [id number]   Status:  Final result   Visible to patient:  Yes (Patient Portal) Next appt:  07/15/2019 at 08:00 AM in Pulmonology (PULMONARY LAB 2, ON) Dx:  SOB (shortness of breath)    Ref Range & Units 11mo ago   Interpretation    Mild obstruction (FEV1 >70% and <79% predicted).   Improvement in airflow following bronchodilator therapy suggests an asthmatic component.   Patient unable to perform lung volumes - results not accurate.   Mild reduction in diffusion capacity - adjusted for hemoglobin (DLCO >60% and less than 79%) .

## 2019-06-28 NOTE — BRIEF OP NOTE
[facility]  Brief Operative Note     SUMMARY     Surgery Date: 6/28/2019     Surgeon(s) and Role:     * Srinivasan Villanueva DPM - Primary    Assisting Surgeon: None    Pre-op Diagnosis:  Hallux valgus (acquired), right foot [M20.11]  Pain in right foot [M79.671]    Post-op Diagnosis:  Post-Op Diagnosis Codes:     * Hallux valgus (acquired), right foot [M20.11]     * Pain in right foot [M79.671]    Procedure(s) (LRB):  BUNIONECTOMY, WITH METATARSAL OSTEOTOMY (Right)    Anesthesia: Local MAC    Description of the findings of the procedure: Nhan bunionectomy, right foot.    Findings/Key Components:  As per diagnosis.    Estimated Blood Loss: * No values recorded between 6/28/2019  7:24 AM and 6/28/2019  8:15 AM *         Specimens:   Specimen (12h ago, onward)    None          Discharge Note    SUMMARY     Admit Date: 6/28/2019    Discharge Date and Time:  06/28/2019 8:15 AM    Hospital Course (synopsis of major diagnoses, care, treatment, and services provided during the course of the hospital stay):  Patient underwent successful Nhan bunionectomy, right foot.     Final Diagnosis: Post-Op Diagnosis Codes:     * Hallux valgus (acquired), right foot [M20.11]     * Pain in right foot [M79.671]    Disposition: Home or Self Care    Follow Up/Patient Instructions:   Weightbearing Status and Wound care:  1. When walking, wear the surgical shoe or walking boot at all times.  2. During daytime/awake hours, if a CAST or POSTERIOR SPLINT is in place, ice the posterior aspect of the leg, behind the knee, 20 minutes on (w/ ice) and followed by 20 minutes off (w/o ice) until follow up; repeat accordingly until follow up. IF no CAST or POSTERIOR SPLINT is in place, ice the anterior aspect of the ankle, in a similar manner. During night time/sleep hours, simply elevating the limb above the level of the heart is sufficient.   3. Elevate the extremity above the level of the heart at all times when sitting.  4. Take  the pain mediations as prescribed for the initial 3 or so days, then only as needed.  5. If no overt medical contra-indication, take Motrin or Advil or Ibuprofen or Aleve in between the pain medication doses.  7. Do not change the dressings.  8. Do not get the dressings wet.     Medications:  Reconciled Home Medications:      Medication List      START taking these medications    cefadroxil 500 MG Cap  Commonly known as:  DURICEF  Take 1 capsule (500 mg total) by mouth every 12 (twelve) hours. for 5 days     oxyCODONE-acetaminophen 7.5-325 mg per tablet  Commonly known as:  PERCOCET  Take 1 tablet by mouth every 6 (six) hours as needed for Pain.        CHANGE how you take these medications    metFORMIN 500 MG 24 hr tablet  Commonly known as:  GLUCOPHAGE-XR  TAKE 2 TABLETS ONE TIME DAILY  What changed:    · how much to take  · how to take this  · when to take this     metoprolol succinate 50 MG 24 hr tablet  Commonly known as:  TOPROL-XL  Take 1 tablet (50 mg total) by mouth once daily.  What changed:  when to take this     omeprazole 20 MG capsule  Commonly known as:  PRILOSEC  Take 1 capsule (20 mg total) by mouth once daily.  What changed:  when to take this     pravastatin 40 MG tablet  Commonly known as:  PRAVACHOL  TAKE 1 TABLET BY MOUTH EVERY DAY  What changed:    · how much to take  · how to take this  · when to take this        CONTINUE taking these medications    ACCU-CHEK FASTCLIX LANCING DEV Misc  Generic drug:  lancets  USE ONE TIME DAILY     ACCU-CHEK SMARTVIEW TEST STRIP Strp  Generic drug:  blood sugar diagnostic  TEST ONE TIME DAILY     albuterol 90 mcg/actuation inhaler  Commonly known as:  PROVENTIL/VENTOLIN HFA  Inhale 2 puffs into the lungs every 4 to 6 hours as needed for Wheezing.     amitriptyline 100 MG tablet  Commonly known as:  ELAVIL  Take 1 tablet (100 mg total) by mouth nightly.     blood-glucose meter kit  Use as instructed     ciclopirox 8 % Soln  Commonly known as:  PENLAC  Apply to  affected toenails at night time DAILY. On 7th day, file nails down, clean all nails with alcohol and restart application process.     collagenase ointment  Commonly known as:  SANTYL  Apply topically once daily.     eszopiclone 3 mg Tab  Commonly known as:  LUNESTA  TAKE 1 TABLET EVERY NIGHT AS NEEDED          No discharge procedures on file.  Follow-up Information     Srinivasan Villanueva DPM On 7/15/2019.    Specialty:  Podiatry  Contact information:  [address] [place] [address] [address] [place] [address] [phone]

## 2019-07-03 NOTE — MR AVS SNAPSHOT
[address] [address]  Phone: [phone]  Fax: [phone]                  Cassandra Campos   2017 4:00 PM   Office Visit    Description:  Female : 1950   Provider:  Denia Maldonado MD   Department:  [facility]           Reason for Visit     Joint Swelling           Diagnoses this Visit        Comments    Patellar bursitis of right knee    -  Primary            To Do List           Future Appointments        Provider Department Dept Phone    2017 11:45 AM Jayro Pedraza Sr., MD [facility] [phone]    2017 3:30 PM [facility], [facility] [phone]    2017 3:30 PM Jayro Pedraza Sr., MD [facility] [phone]    3/7/2017 1:00 PM Tsering Bazzi PA-C [facility] [phone]    2017 9:40 AM Denia Maldonado MD [facility] [phone]      Your Future Surgeries/Procedures     2017   Surgery with Rashaad Watson MD   [facility]    [address] [place] [id number] [phone]            2017   Surgery with Rashaad Watson MD   [facility] ([facility]    [address] [place] [id number] [phone]              Goals (5 Years of Data)     None      [facility]     [facility] Care [facility] -  Assistance  Registered nurses in the [facility] provide clinical advisement, health education, appointment booking, and other advisory services.  Call for this free service at [phone].             Medications           Message regarding Medications     Verify the changes and/or additions to your medication regime listed below are the same as discussed with your clinician today.  If any of these changes or additions are incorrect, please notify your healthcare provider.             Verify that the below  Patient called made her an appointment. She has had c-diff a few years back. Thinks she might have it again. Advised her to go to ER if it gets any worse. Mailed new patient paper work.   list of medications is an accurate representation of the medications you are currently taking.  If none reported, the list may be blank. If incorrect, please contact your healthcare provider. Carry this list with you in case of emergency.           Current Medications     ACCU-CHEK FASTCLIX Misc USE ONE TIME DAILY    amitriptyline (ELAVIL) 100 MG tablet TAKE 1 TABLET (100 MG TOTAL) BY MOUTH NIGHTLY.    aspirin (ECOTRIN) 81 MG EC tablet 81 mg once daily.     blood sugar diagnostic (ACCU-CHEK SMARTVIEW TEST STRIP) Strp Use to test once daily    blood-glucose meter kit Use as instructed    blood-glucose meter Misc 1 each by Misc.(Non-Drug; Combo Route) route once daily.    eszopiclone (LUNESTA) 3 mg Tab Take 1 tablet (3 mg total) by mouth nightly as needed.    FOLIC ACID/MULTIVIT-MIN/LUTEIN (CENTRUM SILVER ORAL) Take by mouth.    hydrocodone-acetaminophen 7.5-325mg (NORCO) 7.5-325 mg per tablet Take 1 tablet by mouth daily as needed for Pain.    metformin (GLUCOPHAGE-XR) 500 MG 24 hr tablet 4 tablets with meal daily    metoprolol succinate (TOPROL-XL) 50 MG 24 hr tablet Take 1 tablet (50 mg total) by mouth once daily.    mupirocin (BACTROBAN) 2 % ointment     nabumetone (RELAFEN) 500 MG tablet Take 500 mg by mouth once daily.    omeprazole (PRILOSEC) 20 MG capsule Take 1 capsule (20 mg total) by mouth once daily.    pravastatin (PRAVACHOL) 40 MG tablet TAKE 1 TABLET ONE TIME DAILY           Clinical Reference Information           Vital Signs - Last Recorded  Most recent update: 1/23/2017  4:45 PM by Denia Maldonado MD    BP Pulse Temp Wt SpO2 BMI    118/78 100 97.4 °F (36.3 °C) (Tympanic) 94.8 kg (208 lb 15.9 oz) (!) 94% 37.02 kg/m2      Blood Pressure          Most Recent Value    BP  118/78      Allergies as of 1/23/2017     No Known Allergies      Immunizations Administered on Date of Encounter - 1/23/2017     None      Orders Placed During Today's Visit      Normal Orders This Visit    Ambulatory referral to  Orthopedics       Instructions    OK to continue the nabumetone (Relafen) once daily as currently taking.  What is bursitis?  A bursa is a fluid-filled sac that helps cushion the muscles, tendons, and bones around a joint. When a bursa becomes inflamed, its called bursitis. Common symptoms of bursitis include pain, tenderness, and swelling that limits movement of the joint.  What causes bursitis?  Bursitis is most often caused by overuse of a joint. The repeated movements irritate the bursa and may cause it to swell. When that happens, other surrounding tissues may become inflamed or have less space to move. Bursitis is most common in large joints such as the knee, shoulder, and hip.       Nonsurgical treatment involves both rest and exercise.    How is bursitis treated?  To help reduce pain and swelling, your healthcare provider may recommend one or more of the following:   · Rest gives the bursa time to heal. This means limiting activities that put stress on the joint.  · Anti-inflammatory medications help reduce painful swelling. In some cases, this can include injections of cortisone or other steroid medicines into the bursa.  · Splints and support bandages improve your comfort and allow the bursa to heal.  · Physical therapy may be used to increase flexibility and strengthen muscles that support the joint.  · Aspiration removes extra fluid from the bursa using a needle. This can help your healthcare provider find out what is causing your bursitis. For example, it might be an infection or overuse.   · Surgery can be used to remove an inflamed or infected bursa. This is rarely needed.  © 2647-7846 [employer]. [address] [place] [address], [place] [address]. All rights reserved. This information is not intended as a substitute for professional medical care. Always follow your healthcare professional's instructions.

## 2019-07-11 ENCOUNTER — PATIENT MESSAGE (OUTPATIENT)
Dept: PODIATRY | Facility: CLINIC | Age: 69
End: 2019-07-11

## 2019-07-11 DIAGNOSIS — M20.11 HALLUX VALGUS (ACQUIRED), RIGHT FOOT: Primary | ICD-10-CM

## 2019-07-11 DIAGNOSIS — M79.671 PAIN IN RIGHT FOOT: ICD-10-CM

## 2019-07-11 RX ORDER — OXYCODONE AND ACETAMINOPHEN 5; 325 MG/1; MG/1
1 TABLET ORAL EVERY 8 HOURS PRN
Qty: 15 TABLET | Refills: 0 | Status: SHIPPED | OUTPATIENT
Start: 2019-07-11 | End: 2019-07-16

## 2019-07-11 RX ORDER — SULFAMETHOXAZOLE AND TRIMETHOPRIM 800; 160 MG/1; MG/1
1 TABLET ORAL 2 TIMES DAILY
Qty: 14 TABLET | Refills: 0 | Status: SHIPPED | OUTPATIENT
Start: 2019-07-11 | End: 2019-07-18

## 2019-07-15 ENCOUNTER — CLINICAL SUPPORT (OUTPATIENT)
Dept: PULMONOLOGY | Facility: CLINIC | Age: 69
End: 2019-07-15
Payer: MEDICARE

## 2019-07-15 ENCOUNTER — OFFICE VISIT (OUTPATIENT)
Dept: PODIATRY | Facility: CLINIC | Age: 69
End: 2019-07-15
Payer: MEDICARE

## 2019-07-15 VITALS
SYSTOLIC BLOOD PRESSURE: 130 MMHG | RESPIRATION RATE: 17 BRPM | WEIGHT: 202.81 LBS | DIASTOLIC BLOOD PRESSURE: 82 MMHG | BODY MASS INDEX: 35.93 KG/M2 | HEART RATE: 79 BPM | HEIGHT: 63 IN

## 2019-07-15 DIAGNOSIS — M20.11 HALLUX VALGUS (ACQUIRED), RIGHT FOOT: Primary | ICD-10-CM

## 2019-07-15 DIAGNOSIS — M79.671 PAIN IN RIGHT FOOT: ICD-10-CM

## 2019-07-15 DIAGNOSIS — E55.9 VITAMIN D DEFICIENCY: ICD-10-CM

## 2019-07-15 DIAGNOSIS — E11.9 CONTROLLED TYPE 2 DIABETES MELLITUS WITHOUT COMPLICATION, WITHOUT LONG-TERM CURRENT USE OF INSULIN: ICD-10-CM

## 2019-07-15 DIAGNOSIS — J98.4 RESTRICTIVE LUNG DISEASE: ICD-10-CM

## 2019-07-15 LAB
BRPFT: NORMAL
FEF 25 75 CHG: 30.6 %
FEF 25 75 LLN: 0.61
FEF 25 75 POST REF: 68.1 %
FEF 25 75 PRE REF: 52.2 %
FEF 25 75 REF: 1.62
FET100 CHG: -24.8 %
FEV1 CHG: 13.2 %
FEV1 FVC CHG: 5.4 %
FEV1 FVC LLN: 66
FEV1 FVC POST REF: 92.7 %
FEV1 FVC PRE REF: 88 %
FEV1 FVC REF: 79
FEV1 LLN: 1.29
FEV1 POST REF: 81.4 %
FEV1 PRE REF: 71.9 %
FEV1 REF: 1.84
FVC CHG: 7.4 %
FVC LLN: 1.67
FVC POST REF: 87.4 %
FVC PRE REF: 81.3 %
FVC REF: 2.35
PEF CHG: 25.2 %
PEF LLN: 2.83
PEF POST REF: 79.5 %
PEF PRE REF: 63.5 %
PEF REF: 4.77
POST FEF 25 75: 1.1 L/S (ref 0.61–2.62)
POST FET 100: 8.09 SEC
POST FEV1 FVC: 72.96 % (ref 65.78–91.62)
POST FEV1: 1.5 L (ref 1.29–2.4)
POST FVC: 2.06 L (ref 1.67–3.04)
POST PEF: 3.79 L/S (ref 2.83–6.71)
PRE FEF 25 75: 0.84 L/S (ref 0.61–2.62)
PRE FET 100: 10.76 SEC
PRE FEV1 FVC: 69.23 % (ref 65.78–91.62)
PRE FEV1: 1.32 L (ref 1.29–2.4)
PRE FVC: 1.91 L (ref 1.67–3.04)
PRE PEF: 3.03 L/S (ref 2.83–6.71)

## 2019-07-15 PROCEDURE — 99999 PR PBB SHADOW E&M-EST. PATIENT-LVL III: CPT | Mod: PBBFAC,HCNC,, | Performed by: PODIATRIST

## 2019-07-15 PROCEDURE — 99024 POSTOP FOLLOW-UP VISIT: CPT | Mod: HCNC,S$GLB,, | Performed by: PODIATRIST

## 2019-07-15 PROCEDURE — 94060 EVALUATION OF WHEEZING: CPT | Mod: HCNC,S$GLB,, | Performed by: INTERNAL MEDICINE

## 2019-07-15 PROCEDURE — 99999 PR PBB SHADOW E&M-EST. PATIENT-LVL III: ICD-10-PCS | Mod: PBBFAC,HCNC,, | Performed by: PODIATRIST

## 2019-07-15 PROCEDURE — 99024 PR POST-OP FOLLOW-UP VISIT: ICD-10-PCS | Mod: HCNC,S$GLB,, | Performed by: PODIATRIST

## 2019-07-15 PROCEDURE — 94060 PR EVAL OF BRONCHOSPASM: ICD-10-PCS | Mod: HCNC,S$GLB,, | Performed by: INTERNAL MEDICINE

## 2019-07-15 NOTE — PROGRESS NOTES
Subjective:       Patient ID: Cassandra Campos is a 68 y.o. female.    Chief Complaint: Post-op Evaluation (2 wk post-op, pain 5/10, ambulation without ephraim, diabetic, PCP Dr. Maldonado)      HPI:  Cassandra Campos presents to the office today, s/p 6/28/19 RLE Nhan bunionectomy. Patient is also s/p 5/10/2019 LLE bunionectomy. Patient is a DMII w/o complications. She does continue Vit. D supplementation. Patient did come my office on last week stating severe pains.  At that time, I did reorder pain medications and start oral antibiotic.  Patient was unable to be seen due to the volume inpatient on that day, which is prior to the pending bed weather.  Patient states controlled blood sugars.  At this time, patient states her pains on the right lower extremity are 5/10.  Patient does present ambulatory with a walking boot.    Hemoglobin A1C   Date Value Ref Range Status   06/18/2019 6.2 (H) 4.0 - 5.6 % Final     Comment:     ADA Screening Guidelines:  5.7-6.4%  Consistent with prediabetes  >or=6.5%  Consistent with diabetes  High levels of fetal hemoglobin interfere with the HbA1C  assay. Heterozygous hemoglobin variants (HbS, HgC, etc)do  not significantly interfere with this assay.   However, presence of multiple variants may affect accuracy.     02/19/2019 6.1 (H) 4.0 - 5.6 % Final     Comment:     ADA Screening Guidelines:  5.7-6.4%  Consistent with prediabetes  >or=6.5%  Consistent with diabetes  High levels of fetal hemoglobin interfere with the HbA1C  assay. Heterozygous hemoglobin variants (HbS, HgC, etc)do  not significantly interfere with this assay.   However, presence of multiple variants may affect accuracy.     08/14/2018 5.7 (H) 4.0 - 5.6 % Final     Comment:     ADA Screening Guidelines:  5.7-6.4%  Consistent with prediabetes  >or=6.5%  Consistent with diabetes  High levels of fetal hemoglobin interfere with the HbA1C  assay. Heterozygous hemoglobin variants (HbS, HgC, etc)do  not significantly  interfere with this assay.   However, presence of multiple variants may affect accuracy.           Review of patient's allergies indicates:  No Known Allergies    Past Medical History:   Diagnosis Date    Arthritis     hands    Borderline glaucoma     Gastritis     upper GI 2017    Hydradenitis     Hyperlipidemia     Hypertension     Insomnia     Migraines 2000    Morbid obesity due to excess calories 2016    Nasal septum perforation     Obesity     Pneumonia     Restrictive airway disease     Sleep apnea     SVT (supraventricular tachycardia) 2013    Type 2 diabetes mellitus     Type II or unspecified type diabetes mellitus without mention of complication, not stated as uncontrolled 2013       Family History   Problem Relation Age of Onset    Prostate cancer Brother     Diabetes Maternal Aunt        Social History     Socioeconomic History    Marital status:      Spouse name: Not on file    Number of children: 1    Years of education: Not on file    Highest education level: Not on file   Occupational History    Occupation:  aid   Social Needs    Financial resource strain: Not on file    Food insecurity:     Worry: Not on file     Inability: Not on file    Transportation needs:     Medical: Not on file     Non-medical: Not on file   Tobacco Use    Smoking status: Former Smoker     Packs/day: 0.50     Years: 30.00     Pack years: 15.00     Types: Cigarettes     Start date: 1970     Last attempt to quit: 2012     Years since quittin.5    Smokeless tobacco: Never Used   Substance and Sexual Activity    Alcohol use: Yes     Comment: rarely // No alcohol 72 HOURS prior to surgery    Drug use: No    Sexual activity: Not Currently     Partners: Male   Lifestyle    Physical activity:     Days per week: Not on file     Minutes per session: Not on file    Stress: Not on file   Relationships    Social connections:     Talks on phone:  Not on file     Gets together: Not on file     Attends Islam service: Not on file     Active member of club or organization: Not on file     Attends meetings of clubs or organizations: Not on file     Relationship status: Not on file   Other Topics Concern    Not on file   Social History Narrative    Single, part-time teacher. Masters degree biology.        Past Surgical History:   Procedure Laterality Date    ARTHROGRAM Left 2016    [facility]    ARTHROSCOPY-KNEE Right 2017    [facility]    ARTHROSCOPY-KNEE W/ CHONDROPLASTY Right 2017    [facility]    ARTHROSCOPY-MENISCECTOMY Right 2017    [facility]    AXILLARY HIDRADENITIS EXCISION      BREAST BIOPSY Bilateral     BREAST LUMPECTOMY Bilateral 1998    Benign    BUNIONECTOMY, WITH METATARSAL OSTEOTOMY Right 2019    [facility]    BUNIONECTOMY, WITH METATARSAL OSTEOTOMY Left 5/10/2019    [facility]    BURSECTOMY, OLECRANON Right 2018    [facility]    CARPAL TUNNEL RELEASE      bilateral    CATARACT EXTRACTION Bilateral     OU     SECTION  1979    CHOLECYSTECTOMY  2014    CYST REMOVAL  2015    sebaceous cyst removed from face    ESOPHAGOGASTRODUODENOSCOPY (EGD) N/A 2017    [facility]    EXCISION, EXOSTOSIS Right 2018    [facility]    gastric sleeve  2017    Dr. Watson    KNEE SURGERY Right     MICROFRACTURE Right 2017    [facility]    RELEASE-FINGER-TRIGGER Right 2017    [facility]    RELEASE-FINGER-TRIGGER Right 2015    [facility]    SYNOVECTOMY-KNEE  "Right 6/16/2017    [facility]    TENOSYNOVECTOMY Right 6/16/2017    [facility]    TENOSYNOVECTOMY Right 4/1/2015    [facility]    TONSILLECTOMY, ADENOIDECTOMY  1980s    TRIGGER FINGER RELEASE Right april 1, 2015    Dr. Milo HALL ROBOT ASSISTED LAPAROSCOPIC SLEEVE-GASTRECTOMY N/A 2/13/2017    Performed by [facility] at [facility]       Review of Systems   Constitutional: Negative for chills, fatigue and fever.   HENT: Negative for hearing loss.    Eyes: Negative for photophobia and visual disturbance.   Respiratory: Negative for cough, chest tightness, shortness of breath and wheezing.    Cardiovascular: Negative for chest pain and palpitations.   Gastrointestinal: Negative for constipation, diarrhea, nausea and vomiting.   Endocrine: Negative for cold intolerance and heat intolerance.   Genitourinary: Negative for flank pain.   Musculoskeletal: Positive for gait problem. Negative for neck pain and neck stiffness.   Skin: Negative for wound.   Neurological: Positive for numbness. Negative for light-headedness and headaches.   Psychiatric/Behavioral: Negative for sleep disturbance.          Objective:   /82 (BP Location: Right arm, Patient Position: Sitting, BP Method: Medium (Automatic))   Pulse 79   Resp 17   Ht 5' 3" (1.6 m)   Wt 92 kg (202 lb 13.2 oz)   BMI 35.93 kg/m²       LOWER EXTREMITY PHYSICAL EXAMINATION    NEUROLOGY: Proprioception is intact. Sensation to light touch is intact. Lisa-incisional sensation is intact.    VASCULAR: On the right foot, the dorsalis pedis pulse is 2/4 and the posterior tibial pulse is 1/4. Capillary refill time is less than 3 seconds. Hair growth is present on the dorsum of the foot and at the digits. No rubor is present. Proximal to distal temperature is warm to warm. No ipsilateral calf pain or tenderness is noted with palpation and compression. No " palpable cords noted.    DERMATOLOGY:  No signs of wound healing complications pre or post suture removal.    ORTHOPEDIC:  Anatomic alignment of 1st ray is noted. Mild edema is noted to the 1st metatarsophalangeal joint    Assessment:     1. Hallux valgus (acquired), right foot    2. Pain in right foot    3. Controlled type 2 diabetes mellitus without complication, without long-term current use of insulin    4. Vitamin D deficiency          Plan:     Hallux valgus (acquired), right foot  Pain in right foot  Thorough discussion is had with the patient today, concerning the diagnosis, its etiology, and the treatment algorithm at present.  Sutures are removed without incident.  Patient may discontinue CAM Walker and transition to surgical shoe.  Local wound care with Betadine paint and DSD.  Patient may start to shower and bathe starting on this Wednesday. Please repeat Xrays upon follow up.      Controlled type 2 diabetes mellitus without complication, without long-term current use of insulin  I counseled the patient on his/her Diabetic Mellitus regarding today's clinical examination and objection findings. We did also discuss recent medication changes, pertinent labs and imaging evaluations and other medical consultation notes and progress notes. Greater than 50% of this visit was spent on counseling and coordination of care. Greater than 20 minutes of this appt. was spent on education about the diabetic foot, in relation to PVD and/or neuropathy, and the prevention of limb loss.     Shoe gear is inspected and wear and proper fit/type. Patient is reminded of the importance of good nutrition and blood sugar control to help prevent podiatric complications of diabetes. Patient instructed on proper foot hygeine. We discussed wearing proper shoe gear, daily foot inspections, never walking without protective shoe gear, never putting sharp instruments to feet.  Patient  will continue to monitor the areas daily,  inspect feet, wear protective shoe gear when ambulatory, moisturizer to maintain skin integrity.     Patient's DMI/DMII is managed by Primary Care Provider and/or Endocrinology Advanced Practice Provider. As per the most recent glycohemoglobin level, this patient is at goal.    Vitamin D deficiency  Continue oral supplementation.        Future Appointments   Date Time Provider Department Center   7/29/2019  9:15 AM Srinivasan Villanueva DPM [facility]   8/22/2019 11:00 AM Twan Pelaez MD [facility] CARDIO [facility]   8/22/2019 11:30 AM LABORATORY, [facility] HG LAB [facility]   8/22/2019  1:00 PM [facility]

## 2019-07-29 ENCOUNTER — OFFICE VISIT (OUTPATIENT)
Dept: INTERNAL MEDICINE | Facility: CLINIC | Age: 69
End: 2019-07-29
Payer: MEDICARE

## 2019-07-29 ENCOUNTER — HOSPITAL ENCOUNTER (OUTPATIENT)
Dept: RADIOLOGY | Facility: HOSPITAL | Age: 69
Discharge: HOME | End: 2019-07-29
Attending: PODIATRIST
Payer: MEDICARE

## 2019-07-29 ENCOUNTER — OFFICE VISIT (OUTPATIENT)
Dept: PODIATRY | Facility: CLINIC | Age: 69
End: 2019-07-29
Payer: MEDICARE

## 2019-07-29 VITALS
DIASTOLIC BLOOD PRESSURE: 75 MMHG | WEIGHT: 205.69 LBS | HEART RATE: 98 BPM | HEIGHT: 63 IN | SYSTOLIC BLOOD PRESSURE: 124 MMHG | BODY MASS INDEX: 36.45 KG/M2

## 2019-07-29 VITALS
SYSTOLIC BLOOD PRESSURE: 122 MMHG | HEIGHT: 63 IN | HEART RATE: 92 BPM | DIASTOLIC BLOOD PRESSURE: 84 MMHG | TEMPERATURE: 98 F | BODY MASS INDEX: 36.5 KG/M2 | OXYGEN SATURATION: 95 % | WEIGHT: 206 LBS

## 2019-07-29 DIAGNOSIS — M79.671 PAIN IN RIGHT FOOT: ICD-10-CM

## 2019-07-29 DIAGNOSIS — E66.01 SEVERE OBESITY (BMI 35.0-39.9) WITH COMORBIDITY: ICD-10-CM

## 2019-07-29 DIAGNOSIS — M20.11 HALLUX VALGUS (ACQUIRED), RIGHT FOOT: ICD-10-CM

## 2019-07-29 DIAGNOSIS — R07.81 RIB PAIN ON RIGHT SIDE: Primary | ICD-10-CM

## 2019-07-29 DIAGNOSIS — G47.00 INSOMNIA, UNSPECIFIED TYPE: ICD-10-CM

## 2019-07-29 DIAGNOSIS — M20.11 HALLUX VALGUS (ACQUIRED), RIGHT FOOT: Primary | ICD-10-CM

## 2019-07-29 DIAGNOSIS — E11.9 CONTROLLED TYPE 2 DIABETES MELLITUS WITHOUT COMPLICATION, WITHOUT LONG-TERM CURRENT USE OF INSULIN: ICD-10-CM

## 2019-07-29 PROCEDURE — 3079F PR MOST RECENT DIASTOLIC BLOOD PRESSURE 80-89 MM HG: ICD-10-PCS | Mod: HCNC,CPTII,S$GLB, | Performed by: FAMILY MEDICINE

## 2019-07-29 PROCEDURE — 99024 PR POST-OP FOLLOW-UP VISIT: ICD-10-PCS | Mod: HCNC,S$GLB,, | Performed by: PODIATRIST

## 2019-07-29 PROCEDURE — 3074F SYST BP LT 130 MM HG: CPT | Mod: HCNC,CPTII,S$GLB, | Performed by: FAMILY MEDICINE

## 2019-07-29 PROCEDURE — 1101F PT FALLS ASSESS-DOCD LE1/YR: CPT | Mod: HCNC,CPTII,S$GLB, | Performed by: FAMILY MEDICINE

## 2019-07-29 PROCEDURE — 3074F PR MOST RECENT SYSTOLIC BLOOD PRESSURE < 130 MM HG: ICD-10-PCS | Mod: HCNC,CPTII,S$GLB, | Performed by: FAMILY MEDICINE

## 2019-07-29 PROCEDURE — 99213 OFFICE O/P EST LOW 20 MIN: CPT | Mod: HCNC,S$GLB,, | Performed by: FAMILY MEDICINE

## 2019-07-29 PROCEDURE — 99999 PR PBB SHADOW E&M-EST. PATIENT-LVL III: ICD-10-PCS | Mod: PBBFAC,HCNC,, | Performed by: PODIATRIST

## 2019-07-29 PROCEDURE — 73630 XR FOOT COMPLETE 3 VIEW RIGHT: ICD-10-PCS | Mod: 26,HCNC,RT, | Performed by: RADIOLOGY

## 2019-07-29 PROCEDURE — 99213 PR OFFICE/OUTPT VISIT, EST, LEVL III, 20-29 MIN: ICD-10-PCS | Mod: HCNC,S$GLB,, | Performed by: FAMILY MEDICINE

## 2019-07-29 PROCEDURE — 99024 POSTOP FOLLOW-UP VISIT: CPT | Mod: HCNC,S$GLB,, | Performed by: PODIATRIST

## 2019-07-29 PROCEDURE — 1101F PR PT FALLS ASSESS DOC 0-1 FALLS W/OUT INJ PAST YR: ICD-10-PCS | Mod: HCNC,CPTII,S$GLB, | Performed by: FAMILY MEDICINE

## 2019-07-29 PROCEDURE — 99999 PR PBB SHADOW E&M-EST. PATIENT-LVL III: CPT | Mod: PBBFAC,HCNC,, | Performed by: PODIATRIST

## 2019-07-29 PROCEDURE — 73630 X-RAY EXAM OF FOOT: CPT | Mod: TC,HCNC,RT

## 2019-07-29 PROCEDURE — 73630 X-RAY EXAM OF FOOT: CPT | Mod: 26,HCNC,RT, | Performed by: RADIOLOGY

## 2019-07-29 PROCEDURE — 99999 PR PBB SHADOW E&M-EST. PATIENT-LVL III: CPT | Mod: PBBFAC,HCNC,, | Performed by: FAMILY MEDICINE

## 2019-07-29 PROCEDURE — 99999 PR PBB SHADOW E&M-EST. PATIENT-LVL III: ICD-10-PCS | Mod: PBBFAC,HCNC,, | Performed by: FAMILY MEDICINE

## 2019-07-29 PROCEDURE — 3079F DIAST BP 80-89 MM HG: CPT | Mod: HCNC,CPTII,S$GLB, | Performed by: FAMILY MEDICINE

## 2019-07-29 RX ORDER — ESZOPICLONE 3 MG/1
TABLET, FILM COATED ORAL
Qty: 90 TABLET | Refills: 1 | Status: CANCELLED | OUTPATIENT
Start: 2019-07-29

## 2019-07-29 RX ORDER — IBUPROFEN 600 MG/1
600 TABLET ORAL 3 TIMES DAILY PRN
Qty: 30 TABLET | Refills: 0 | Status: SHIPPED | OUTPATIENT
Start: 2019-07-29 | End: 2019-08-22

## 2019-07-29 RX ORDER — CYCLOBENZAPRINE HCL 5 MG
5 TABLET ORAL NIGHTLY PRN
Qty: 20 TABLET | Refills: 0 | Status: SHIPPED | OUTPATIENT
Start: 2019-07-29 | End: 2019-08-08

## 2019-07-29 NOTE — PROGRESS NOTES
Subjective:       Patient ID: Cassandra Campos is a 69 y.o. female.    Chief Complaint: possible pulled muscle (right side)    Noted pain to right side for last week. Notes pain with movement.  No exacerbating activity recalled.  She is coughing - it does hurt with coughing as well.  Taking aleve two every 4 hrs - up to 6 daily. None today.  No problem with sleeping.    Back Pain   This is a new problem. The current episode started in the past 7 days. The problem has been gradually worsening since onset. The pain is present in the thoracic spine. The quality of the pain is described as aching. The pain does not radiate. The pain is at a severity of 8/10. The pain is moderate. The pain is the same all the time. The symptoms are aggravated by position and twisting. Stiffness is present all day. Pertinent negatives include no abdominal pain, bladder incontinence, bowel incontinence, chest pain, dysuria, fever, headaches, leg pain, numbness, paresis, paresthesias, pelvic pain, perianal numbness, tingling, weakness or weight loss. Risk factors include lack of exercise, obesity and sedentary lifestyle. She has tried NSAIDs for the symptoms. The treatment provided mild relief.     Review of Systems   Constitutional: Negative for fever and weight loss.   Cardiovascular: Negative for chest pain.   Gastrointestinal: Negative for abdominal pain and bowel incontinence.   Genitourinary: Negative for bladder incontinence, dysuria, hematuria and pelvic pain.   Musculoskeletal: Positive for back pain and myalgias.   Neurological: Negative for tingling, weakness, numbness, headaches and paresthesias.       Objective:      Physical Exam   Constitutional: She is oriented to person, place, and time. She appears well-developed.   HENT:   Head: Normocephalic and atraumatic.   Cardiovascular: Normal rate, regular rhythm and normal heart sounds.   Pulmonary/Chest: Effort normal and breath sounds normal. She exhibits tenderness (Tender to  palpation over right lateral ribs).   Pain to right lateral ribs with left lateral flexion and bilateral trunk rotation.   Neurological: She is alert and oriented to person, place, and time.   Skin: Skin is warm and dry.   Psychiatric: She has a normal mood and affect. Her behavior is normal.         Assessment/Plan:     1. Rib pain on right side  ibuprofen (ADVIL,MOTRIN) 600 MG tablet    cyclobenzaprine (FLEXERIL) 5 MG tablet   2. Insomnia, unspecified type      Muscle relaxer at night for few days.  Ibuprofen okay b.i.d. to t.i.d..  Continue to monitor.    Answers for HPI/ROS submitted by the patient on 7/27/2019   Back pain  genital pain: No

## 2019-07-29 NOTE — PROGRESS NOTES
Subjective:       Patient ID: Cassandra Campos is a 69 y.o. female.    Chief Complaint: Post-op Evaluation (DOS: 6/28/19-bunionectomy, right foot; pt reports pain at 8/10.)      HPI:  Cassandra Campos presents to the office today, s/p 6/28/19 RLE Nhan bunionectomy. Patient is also s/p 5/10/2019 LLE bunionectomy. Patient is a DMII w/o complications. She does continue Vit. D supplementation.  Patient denies local or systemic signs of infection at this time.  She does state markedly swelling, moderate.  States her pains are 8/10.  She does present ambulatory with a flip-flop.  Patient denies RICE therapy.  Denies wound healing complications.    Hemoglobin A1C   Date Value Ref Range Status   06/18/2019 6.2 (H) 4.0 - 5.6 % Final     Comment:     ADA Screening Guidelines:  5.7-6.4%  Consistent with prediabetes  >or=6.5%  Consistent with diabetes  High levels of fetal hemoglobin interfere with the HbA1C  assay. Heterozygous hemoglobin variants (HbS, HgC, etc)do  not significantly interfere with this assay.   However, presence of multiple variants may affect accuracy.     02/19/2019 6.1 (H) 4.0 - 5.6 % Final     Comment:     ADA Screening Guidelines:  5.7-6.4%  Consistent with prediabetes  >or=6.5%  Consistent with diabetes  High levels of fetal hemoglobin interfere with the HbA1C  assay. Heterozygous hemoglobin variants (HbS, HgC, etc)do  not significantly interfere with this assay.   However, presence of multiple variants may affect accuracy.     08/14/2018 5.7 (H) 4.0 - 5.6 % Final     Comment:     ADA Screening Guidelines:  5.7-6.4%  Consistent with prediabetes  >or=6.5%  Consistent with diabetes  High levels of fetal hemoglobin interfere with the HbA1C  assay. Heterozygous hemoglobin variants (HbS, HgC, etc)do  not significantly interfere with this assay.   However, presence of multiple variants may affect accuracy.         Review of patient's allergies indicates:  No Known Allergies    Past Medical History:    Diagnosis Date    Arthritis     hands    Borderline glaucoma     Gastritis     upper GI 2017    Hydradenitis     Hyperlipidemia     Hypertension     Insomnia     Migraines 2000    Morbid obesity due to excess calories 2016    Nasal septum perforation     Obesity     Pneumonia     Restrictive airway disease     Sleep apnea     SVT (supraventricular tachycardia) 2013    Type 2 diabetes mellitus     Type II or unspecified type diabetes mellitus without mention of complication, not stated as uncontrolled 2013       Family History   Problem Relation Age of Onset    Prostate cancer Brother     Diabetes Maternal Aunt        Social History     Socioeconomic History    Marital status:      Spouse name: Not on file    Number of children: 1    Years of education: Not on file    Highest education level: Not on file   Occupational History    Occupation:  aid   Social Needs    Financial resource strain: Not on file    Food insecurity:     Worry: Not on file     Inability: Not on file    Transportation needs:     Medical: Not on file     Non-medical: Not on file   Tobacco Use    Smoking status: Former Smoker     Packs/day: 0.50     Years: 30.00     Pack years: 15.00     Types: Cigarettes     Start date: 1970     Last attempt to quit: 2012     Years since quittin.5    Smokeless tobacco: Never Used   Substance and Sexual Activity    Alcohol use: Yes     Comment: rarely // No alcohol 72 HOURS prior to surgery    Drug use: No    Sexual activity: Not Currently     Partners: Male   Lifestyle    Physical activity:     Days per week: Not on file     Minutes per session: Not on file    Stress: Not on file   Relationships    Social connections:     Talks on phone: Not on file     Gets together: Not on file     Attends Spiritism service: Not on file     Active member of club or organization: Not on file     Attends meetings of clubs or  organizations: Not on file     Relationship status: Not on file   Other Topics Concern    Not on file   Social History Narrative    Single, part-time teacher. Masters degree biology.        Past Surgical History:   Procedure Laterality Date    ARTHROGRAM Left 2016    Performed by Jayro Pedraza Sr., MD at [facility] OR    ARTHROSCOPY-KNEE Right 2017    Performed by Jayro Ped[facility]za Sr[facility] MD at [facility] OR    ARTHROSCOPY-KNEE W/ CHONDROPLASTY Right 2017    Performed by Jayro Ped[facility]za Sr[facility] MD at [facility] OR    ARTHROSCOPY-MENISCECTOMY Right 2017    Performed by [facility] Sr[facility] at [facility] OR    AXILLARY HIDRADENITIS EXCISION      BREAST BIOPSY Bilateral     BREAST LUMPECTOMY Bilateral 1998    Benign    BUNIONECTOMY, WITH METATARSAL OSTEOTOMY Right 2019    Performed by Srinivasan Villanueva DPM at [facility] OR    BUNIONECTOMY, WITH METATARSAL OSTEOTOMY Left 5/10/2019    Performed by S[facility]ivasan Vill[facility]va D[facility] at [facility] OR    BURSECTOMY, OLECRANON Right 2018    Performed by Jayro Pedraza Sr[facility] MD at [facility] OR    CARPAL TUNNEL RELEASE      bilateral    CATARACT EXTRACTION Bilateral     OU     SECTION  1979    CHOLECYSTECTOMY  2014    CYST REMOVAL  2015    sebaceous cyst removed from face    ESOPHAGOGASTRODUODENOSCOPY (EGD) N/A 2017    Performed by Rashaad Watson MD at [facility] ENDO    EXCISION, EXOSTOSIS Right 2018    Performed by Jay[facility] Sr[facility] MD at [facility] OR    gastric sleeve  2017    Dr. Watson    KNEE SURGERY Right     MICROFRACTURE Right 2017    Performed by Jayro Ped[facility] Sr[facility] MD at [facility] OR    RELEASE-FINGER-TRIGGER Right 2017    Performed by Jayro Pedraza Sr., MD at [facility] OR    RELEASE-FINGER-TRIGGER Right 2015    Performed by Jayro Pedraza Sr., MD at [facility]    SYNOVECTOMY-KNEE Right 2017    Performed by Jayro Pedraza Sr., MD at [facility]    TENOSYNOVECTOMY Right 2017    Performed by Jayro Pedra[facility] Sr[facility] MD at [facility] OR    TENOSYNOVECTOMY Right  "4/1/2015    Performed [facility] at [facility] OR    TONSILLECTOMY, ADENOIDECTOMY  1980s    TRIGGER FINGER RELEASE Right april 1, 2015    Dr. Pedraza    XI ROBOT ASSISTED LAPAROSCOPIC SLEEVE-GASTRECTOMY N/A 2/13/2017    Performed by Rashaad Watson MD at [facility] OR       Review of Systems   Constitutional: Negative for chills, fatigue and fever.   HENT: Negative for hearing loss.    Eyes: Negative for photophobia and visual disturbance.   Respiratory: Negative for cough, chest tightness, shortness of breath and wheezing.    Cardiovascular: Negative for chest pain and palpitations.   Gastrointestinal: Negative for constipation, diarrhea, nausea and vomiting.   Endocrine: Negative for cold intolerance and heat intolerance.   Genitourinary: Negative for flank pain.   Musculoskeletal: Positive for gait problem. Negative for neck pain and neck stiffness.   Skin: Negative for wound.   Neurological: Negative for light-headedness, numbness and headaches.   Psychiatric/Behavioral: Negative for sleep disturbance.          Objective:   /75 (BP Location: Left arm, Patient Position: Sitting)   Pulse 98   Ht 5' 3" (1.6 m)   Wt 93.3 kg (205 lb 11 oz)   BMI 36.44 kg/m²     X-Ray Foot Complete Right  Narrative: EXAMINATION:  XR FOOT COMPLETE 3 VIEW RIGHT    CLINICAL HISTORY:  s/p HAV correction;. Hallux valgus (acquired), right foot    TECHNIQUE:  AP, lateral, and oblique views of the foot were performed.    COMPARISON:  06/28/2019    FINDINGS:  Osteotomy changes associated with the 1st metatarsal head.  Degenerative changes noted at the 1st MTP joint.  Bandaging material has been removed in the interval.  There is some persistent soft tissue swelling.  Impression: As above    Electronically signed by: Solomon Pennington DO  Date:    07/29/2019  Time:    09:19       LOWER EXTREMITY PHYSICAL EXAMINATION  VASCULAR: On the right foot, the dorsalis pedis pulse is 2/4 and the posterior tibial pulse is 1/4. Capillary refill time " is less than 3 seconds. Hair growth is sparse to absent on the dorsum of the foot and at the digits. No rubor is present. Proximal to distal temperature is warm to warm.     NEUROLOGY: Proprioception is intact. Sensation to light touch is intact. Lisa-incisional sensation is intact.    DERMATOLOGY: Skin is supple, dry and intact. No ecchymosis is noted. No hypertrophic skin formation. No erythema or cellulitis is noted. No calor is noted.     ORTHOPEDIC:  Moderate edema is noted, right 1st metatarsophalangeal joint. Joint range of motion is slightly painful, but not crepitant.  Mild residual HAV is noted.      Assessment:     1. Hallux valgus (acquired), right foot    2. Pain in right foot    3. Controlled type 2 diabetes mellitus without complication, without long-term current use of insulin    4. Severe obesity (BMI 35.0-39.9) with comorbidity          Plan:     Hallux valgus (acquired), right foot  Pain in right foot  Thorough discussion is had with the patient today, concerning the diagnosis, its etiology, and the treatment algorithm at present.  XRAYS are reviewed in detail with the patient. All questions and concerns regarding findings and its/their implications are outlined and discussed.  X-ray does show progressive healing.  Patient does have pain and swelling as the osteotomy does need more time to consolidate.  As such, please transition back to stiff-soled surgical shoe.  Follow up in approximately 1 month.      Controlled type 2 diabetes mellitus without complication, without long-term current use of insulin  I counseled the patient on his/her Diabetic Mellitus regarding today's clinical examination and objection findings. We did also discuss recent medication changes, pertinent labs and imaging evaluations and other medical consultation notes and progress notes. Greater than 50% of this visit was spent on counseling and coordination of care. Greater than 20 minutes of this appt. was spent on education  about the diabetic foot, in relation to PVD and/or neuropathy, and the prevention of limb loss.     Shoe gear is inspected and wear and proper fit/type. Patient is reminded of the importance of good nutrition and blood sugar control to help prevent podiatric complications of diabetes. Patient instructed on proper foot hygeine. We discussed wearing proper shoe gear, daily foot inspections, never walking without protective shoe gear, never putting sharp instruments to feet.  Patient  will continue to monitor the areas daily, inspect feet, wear protective shoe gear when ambulatory, moisturizer to maintain skin integrity.     Patient's DMI/DMII is managed by Primary Care Provider and/or Endocrinology Advanced Practice Provider. As per the most recent glycohemoglobin level, this patient is at goal.    Severe obesity (BMI 35.0-39.9) with comorbidity  Patient is counseled and reminded concerning the importance of good nutrition and healthy eating habits, which may include blood sugar control, to prevent and/or help podiatric foot and ankle complications.            Future Appointments   Date Time Provider Department Center   8/22/2019 11:00 AM Twan Pelaez MD [facility] CARDIO [facility]   8/22/2019 11:30 AM LABORATORY, [facility] LAB [facility]   8/22/2019  1:00 PM Nixon Mcdermott MD [facility] PULMSVC [facility]   8/29/2019  1:30 PM Srinivasan Villanueva DPM [facility]

## 2019-07-31 ENCOUNTER — PATIENT MESSAGE (OUTPATIENT)
Dept: PODIATRY | Facility: CLINIC | Age: 69
End: 2019-07-31

## 2019-07-31 ENCOUNTER — PATIENT MESSAGE (OUTPATIENT)
Dept: INTERNAL MEDICINE | Facility: CLINIC | Age: 69
End: 2019-07-31

## 2019-07-31 DIAGNOSIS — G47.00 INSOMNIA, UNSPECIFIED TYPE: ICD-10-CM

## 2019-08-01 RX ORDER — ESZOPICLONE 3 MG/1
3 TABLET, FILM COATED ORAL NIGHTLY PRN
Qty: 90 TABLET | Refills: 1 | Status: SHIPPED | OUTPATIENT
Start: 2019-08-01 | End: 2020-01-15

## 2019-08-01 RX ORDER — PRAVASTATIN SODIUM 40 MG/1
40 TABLET ORAL NIGHTLY
Qty: 30 TABLET | Refills: 0 | Status: SHIPPED | OUTPATIENT
Start: 2019-08-01 | End: 2019-09-26 | Stop reason: SDUPTHER

## 2019-08-01 NOTE — TELEPHONE ENCOUNTER
Please advise on a referral for Ortho for her trigger finger that is getting worse. Also needing a refill on Lunesta, last refill 02/13/19.

## 2019-08-02 ENCOUNTER — PATIENT MESSAGE (OUTPATIENT)
Dept: INTERNAL MEDICINE | Facility: CLINIC | Age: 69
End: 2019-08-02

## 2019-08-02 NOTE — TELEPHONE ENCOUNTER
I have no dx for trigger finger in chart - she is on an HMO. Would need to be seen by PCP first. Please schedule.

## 2019-08-08 DIAGNOSIS — M79.641 PAIN OF RIGHT HAND: Primary | ICD-10-CM

## 2019-08-22 ENCOUNTER — CLINICAL SUPPORT (OUTPATIENT)
Dept: CARDIOLOGY | Facility: CLINIC | Age: 69
End: 2019-08-22
Attending: INTERNAL MEDICINE
Payer: MEDICARE

## 2019-08-22 ENCOUNTER — OFFICE VISIT (OUTPATIENT)
Dept: CARDIOLOGY | Facility: CLINIC | Age: 69
End: 2019-08-22
Payer: MEDICARE

## 2019-08-22 ENCOUNTER — PATIENT MESSAGE (OUTPATIENT)
Dept: PODIATRY | Facility: CLINIC | Age: 69
End: 2019-08-22

## 2019-08-22 ENCOUNTER — OFFICE VISIT (OUTPATIENT)
Dept: PULMONOLOGY | Facility: CLINIC | Age: 69
End: 2019-08-22
Payer: MEDICARE

## 2019-08-22 ENCOUNTER — LAB VISIT (OUTPATIENT)
Dept: LAB | Facility: HOSPITAL | Age: 69
End: 2019-08-22
Attending: FAMILY MEDICINE
Payer: MEDICARE

## 2019-08-22 ENCOUNTER — OFFICE VISIT (OUTPATIENT)
Dept: PODIATRY | Facility: CLINIC | Age: 69
End: 2019-08-22
Payer: MEDICARE

## 2019-08-22 VITALS
HEART RATE: 106 BPM | BODY MASS INDEX: 36.41 KG/M2 | RESPIRATION RATE: 18 BRPM | WEIGHT: 205.5 LBS | HEIGHT: 63 IN | OXYGEN SATURATION: 95 % | SYSTOLIC BLOOD PRESSURE: 138 MMHG | DIASTOLIC BLOOD PRESSURE: 80 MMHG

## 2019-08-22 VITALS
DIASTOLIC BLOOD PRESSURE: 72 MMHG | BODY MASS INDEX: 36.41 KG/M2 | WEIGHT: 205.5 LBS | SYSTOLIC BLOOD PRESSURE: 120 MMHG | HEIGHT: 63 IN | HEART RATE: 94 BPM

## 2019-08-22 VITALS
WEIGHT: 205 LBS | SYSTOLIC BLOOD PRESSURE: 127 MMHG | BODY MASS INDEX: 36.32 KG/M2 | DIASTOLIC BLOOD PRESSURE: 84 MMHG | HEIGHT: 63 IN | HEART RATE: 98 BPM

## 2019-08-22 DIAGNOSIS — I10 ESSENTIAL HYPERTENSION: Primary | Chronic | ICD-10-CM

## 2019-08-22 DIAGNOSIS — E11.9 CONTROLLED TYPE 2 DIABETES MELLITUS WITHOUT COMPLICATION, WITHOUT LONG-TERM CURRENT USE OF INSULIN: ICD-10-CM

## 2019-08-22 DIAGNOSIS — M20.11 HALLUX VALGUS (ACQUIRED), RIGHT FOOT: Primary | ICD-10-CM

## 2019-08-22 DIAGNOSIS — E66.9 OBESITY (BMI 30.0-34.9): ICD-10-CM

## 2019-08-22 DIAGNOSIS — Z01.811 PRE-OPERATIVE RESPIRATORY EXAMINATION: ICD-10-CM

## 2019-08-22 DIAGNOSIS — J45.20 MILD INTERMITTENT ASTHMA, UNSPECIFIED WHETHER COMPLICATED: Primary | ICD-10-CM

## 2019-08-22 DIAGNOSIS — R09.89 UNEQUAL BLOOD PRESSURES IN PAIRED EXTREMITIES: ICD-10-CM

## 2019-08-22 DIAGNOSIS — E78.5 HYPERLIPIDEMIA ASSOCIATED WITH TYPE 2 DIABETES MELLITUS: Chronic | ICD-10-CM

## 2019-08-22 DIAGNOSIS — E11.69 HYPERLIPIDEMIA ASSOCIATED WITH TYPE 2 DIABETES MELLITUS: ICD-10-CM

## 2019-08-22 DIAGNOSIS — R06.2 WHEEZING: ICD-10-CM

## 2019-08-22 DIAGNOSIS — E78.5 HYPERLIPIDEMIA ASSOCIATED WITH TYPE 2 DIABETES MELLITUS: ICD-10-CM

## 2019-08-22 DIAGNOSIS — M79.671 PAIN IN RIGHT FOOT: ICD-10-CM

## 2019-08-22 DIAGNOSIS — E66.01 SEVERE OBESITY (BMI 35.0-39.9) WITH COMORBIDITY: ICD-10-CM

## 2019-08-22 DIAGNOSIS — I47.10 PAROXYSMAL SVT (SUPRAVENTRICULAR TACHYCARDIA): ICD-10-CM

## 2019-08-22 DIAGNOSIS — E55.9 VITAMIN D DEFICIENCY: ICD-10-CM

## 2019-08-22 DIAGNOSIS — E11.69 HYPERLIPIDEMIA ASSOCIATED WITH TYPE 2 DIABETES MELLITUS: Chronic | ICD-10-CM

## 2019-08-22 LAB
CHOLEST SERPL-MCNC: 162 MG/DL (ref 120–199)
CHOLEST/HDLC SERPL: 4.3 {RATIO} (ref 2–5)
ESTIMATED AVG GLUCOSE: 131 MG/DL (ref 68–131)
HBA1C MFR BLD HPLC: 6.2 % (ref 4–5.6)
HDLC SERPL-MCNC: 38 MG/DL (ref 40–75)
HDLC SERPL: 23.5 % (ref 20–50)
LDLC SERPL CALC-MCNC: 62.4 MG/DL (ref 63–159)
NONHDLC SERPL-MCNC: 124 MG/DL
TRIGL SERPL-MCNC: 308 MG/DL (ref 30–150)

## 2019-08-22 PROCEDURE — 36415 COLL VENOUS BLD VENIPUNCTURE: CPT | Mod: HCNC

## 2019-08-22 PROCEDURE — 99214 OFFICE O/P EST MOD 30 MIN: CPT | Mod: HCNC,S$GLB,, | Performed by: INTERNAL MEDICINE

## 2019-08-22 PROCEDURE — 99999 PR PBB SHADOW E&M-EST. PATIENT-LVL III: CPT | Mod: PBBFAC,HCNC,, | Performed by: INTERNAL MEDICINE

## 2019-08-22 PROCEDURE — 99214 PR OFFICE/OUTPT VISIT, EST, LEVL IV, 30-39 MIN: ICD-10-PCS | Mod: HCNC,S$GLB,, | Performed by: INTERNAL MEDICINE

## 2019-08-22 PROCEDURE — 1101F PT FALLS ASSESS-DOCD LE1/YR: CPT | Mod: HCNC,CPTII,S$GLB, | Performed by: INTERNAL MEDICINE

## 2019-08-22 PROCEDURE — 93931 UPPER EXTREMITY STUDY: CPT | Mod: HCNC,S$GLB,, | Performed by: INTERNAL MEDICINE

## 2019-08-22 PROCEDURE — 3079F PR MOST RECENT DIASTOLIC BLOOD PRESSURE 80-89 MM HG: ICD-10-PCS | Mod: HCNC,CPTII,S$GLB, | Performed by: INTERNAL MEDICINE

## 2019-08-22 PROCEDURE — 3044F HG A1C LEVEL LT 7.0%: CPT | Mod: HCNC,CPTII,S$GLB, | Performed by: INTERNAL MEDICINE

## 2019-08-22 PROCEDURE — 3075F SYST BP GE 130 - 139MM HG: CPT | Mod: HCNC,CPTII,S$GLB, | Performed by: INTERNAL MEDICINE

## 2019-08-22 PROCEDURE — 80061 LIPID PANEL: CPT | Mod: HCNC

## 2019-08-22 PROCEDURE — 99024 PR POST-OP FOLLOW-UP VISIT: ICD-10-PCS | Mod: HCNC,S$GLB,, | Performed by: PODIATRIST

## 2019-08-22 PROCEDURE — 1101F PR PT FALLS ASSESS DOC 0-1 FALLS W/OUT INJ PAST YR: ICD-10-PCS | Mod: HCNC,CPTII,S$GLB, | Performed by: INTERNAL MEDICINE

## 2019-08-22 PROCEDURE — 99999 PR PBB SHADOW E&M-EST. PATIENT-LVL III: ICD-10-PCS | Mod: PBBFAC,HCNC,, | Performed by: PODIATRIST

## 2019-08-22 PROCEDURE — 93931 CAR US DOPPLER ARTERIAL ARM RIGHT: ICD-10-PCS | Mod: HCNC,S$GLB,, | Performed by: INTERNAL MEDICINE

## 2019-08-22 PROCEDURE — 3074F SYST BP LT 130 MM HG: CPT | Mod: HCNC,CPTII,S$GLB, | Performed by: INTERNAL MEDICINE

## 2019-08-22 PROCEDURE — 99999 PR PBB SHADOW E&M-EST. PATIENT-LVL III: ICD-10-PCS | Mod: PBBFAC,HCNC,, | Performed by: INTERNAL MEDICINE

## 2019-08-22 PROCEDURE — 99024 POSTOP FOLLOW-UP VISIT: CPT | Mod: HCNC,S$GLB,, | Performed by: PODIATRIST

## 2019-08-22 PROCEDURE — 3078F PR MOST RECENT DIASTOLIC BLOOD PRESSURE < 80 MM HG: ICD-10-PCS | Mod: HCNC,CPTII,S$GLB, | Performed by: INTERNAL MEDICINE

## 2019-08-22 PROCEDURE — 3078F DIAST BP <80 MM HG: CPT | Mod: HCNC,CPTII,S$GLB, | Performed by: INTERNAL MEDICINE

## 2019-08-22 PROCEDURE — 3044F PR MOST RECENT HEMOGLOBIN A1C LEVEL <7.0%: ICD-10-PCS | Mod: HCNC,CPTII,S$GLB, | Performed by: INTERNAL MEDICINE

## 2019-08-22 PROCEDURE — 3079F DIAST BP 80-89 MM HG: CPT | Mod: HCNC,CPTII,S$GLB, | Performed by: INTERNAL MEDICINE

## 2019-08-22 PROCEDURE — 3074F PR MOST RECENT SYSTOLIC BLOOD PRESSURE < 130 MM HG: ICD-10-PCS | Mod: HCNC,CPTII,S$GLB, | Performed by: INTERNAL MEDICINE

## 2019-08-22 PROCEDURE — 83036 HEMOGLOBIN GLYCOSYLATED A1C: CPT | Mod: HCNC

## 2019-08-22 PROCEDURE — 99999 PR PBB SHADOW E&M-EST. PATIENT-LVL III: CPT | Mod: PBBFAC,HCNC,, | Performed by: PODIATRIST

## 2019-08-22 PROCEDURE — 3075F PR MOST RECENT SYSTOLIC BLOOD PRESS GE 130-139MM HG: ICD-10-PCS | Mod: HCNC,CPTII,S$GLB, | Performed by: INTERNAL MEDICINE

## 2019-08-22 RX ORDER — ALBUTEROL SULFATE 90 UG/1
2 AEROSOL, METERED RESPIRATORY (INHALATION)
Qty: 18 G | Refills: 11 | Status: SHIPPED | OUTPATIENT
Start: 2019-08-22 | End: 2019-08-22 | Stop reason: SDUPTHER

## 2019-08-22 RX ORDER — ALBUTEROL SULFATE 90 UG/1
2 AEROSOL, METERED RESPIRATORY (INHALATION)
Qty: 18 G | Refills: 11 | Status: SHIPPED | OUTPATIENT
Start: 2019-08-22 | End: 2020-08-21 | Stop reason: SDUPTHER

## 2019-08-22 NOTE — PROGRESS NOTES
Subjective:       Patient ID: Cassandra Campos is a 69 y.o. female.    Chief Complaint: She       Insomnia    HPI   Preop Dr. Powell - right elbow - bone spurs    Dyspnea  Patient complains of shortness of breath. Symptoms occur with more than one block walking, able to walk a few miles at a time. Symptoms began 5 years ago, unchanged since. Associated symptoms include  post nasal drip. She denies chest pain, located left chest. She does not have had recent travel. Weight has been stable. Symptoms are exacerbated by strenuous activity. Symptoms are alleviated by rest.   Exercises regularly  Intermittent cough and chest congestion - seaonal  Sinus congestion - hole in nose from snorting cocaine  Smoked 1ppd for many years - not smoking now, 15 pack years    Lung Nodule  She presents for evaluation and treatment of a lung nodule. The patient had an abnormal imaging study but reports experiencing no current or past pulmonary symptoms. The patient denies other associated symptoms. She quit smoking approximately 6 years ago. The patient has a known exposure to tuberculosis.  Negative TB skin test in past. The patient does not have a history of cancer.          Past Medical History:   Diagnosis Date    Arthritis     hands    Borderline glaucoma     Gastritis     upper GI 2/2017    Hydradenitis     Hyperlipidemia     Hypertension     Insomnia     Migraines 02/01/2000    Morbid obesity due to excess calories 11/1/2016    Nasal septum perforation     Obesity     Pneumonia     Restrictive airway disease     Sleep apnea     SVT (supraventricular tachycardia) 09/2013    Type 2 diabetes mellitus     Type II or unspecified type diabetes mellitus without mention of complication, not stated as uncontrolled 05/20/2013     Past Surgical History:   Procedure Laterality Date    ARTHROGRAM Left 5/25/2016    Performed [facility] Sr[facility] OR    ARTHROSCOPY-KNEE Right 6/16/2017    Performed by Jayro  Milo Trevino [facility] at [facility] OR    ARTHROSCOPY-KNEE W/ CHONDROPLASTY Right 2017    Per[facility] at [facility] OR    ARTHROSCOPY-MENISCECTOMY Right 2017    [facility] at [facility] OR    AXILLARY HIDRADENITIS EXCISION      BREAST BIOPSY Bilateral     BREAST LUMPECTOMY Bilateral 1998    Benign    BUNIONECTOMY, WITH METATARSAL OSTEOTOMY Right 2019    Performed [facility]ivasan [facility]ueva DPM at [facility] OR    BUNIONECTOMY, WITH METATARSAL OSTEOTOMY Left 5/10/2019    Performed [facility]va [facility] at [facility] OR    BURSECTOMY, OLECRANON Right 2018    Performed [facility] Sr[facility] at [facility] OR    CARPAL TUNNEL RELEASE      bilateral    CATARACT EXTRACTION Bilateral     OU     SECTION  1979    CHOLECYSTECTOMY  2014    CYST REMOVAL  2015    sebaceous cyst removed from face    ESOPHAGOGASTRODUODENOSCOPY (EGD) N/A 2017    Performed by Rashaad Watson MD at [facility] ENDO    EXCISION, EXOSTOSIS Right 2018    Per[facility] Sr[facility] at [facility] OR    gastric sleeve  2017    Dr. Watson    KNEE SURGERY Right     MICROFRACTURE Right 2017    Per[facility] Sr[facility] at [facility] OR    RELEASE-FINGER-TRIGGER Right 2017    Performed by [facility] Sr[facility] at [facility] OR    RELEASE-FINGER-TRIGGER Right 2015    Performed by [facility] Sr[facility] at [facility] OR    SYNOVECTOMY-KNEE Right 2017    Performed by Jayro Pedra[facility] Sr[facility] at [facility]    TENOSYNOVECTOMY Right 2017    Per[facility] at [facility] OR    TENOSYNOVECTOMY Right 2015    Performed by [facility] Sr[facility] at [facility] OR    TONSILLECTOMY, ADENOIDECTOMY  1980s    TRIGGER FINGER RELEASE Right 2015    Dr. Milo HALL ROBOT ASSISTED LAPAROSCOPIC SLEEVE-GASTRECTOMY N/A 2017    Performed by R[facility]ad Watson [facility] at [facility] OR     Social History     Socioeconomic History    Marital status:      Spouse name: Not on file    Number  of children: 1    Years of education: Not on file    Highest education level: Not on file   Occupational History    Occupation:  aid   Social Needs    Financial resource strain: Not on file    Food insecurity:     Worry: Not on file     Inability: Not on file    Transportation needs:     Medical: Not on file     Non-medical: Not on file   Tobacco Use    Smoking status: Former Smoker     Packs/day: 0.50     Years: 30.00     Pack years: 15.00     Types: Cigarettes     Start date: 1970     Last attempt to quit: 2012     Years since quittin.6    Smokeless tobacco: Never Used   Substance and Sexual Activity    Alcohol use: Yes     Comment: rarely // No alcohol 72 HOURS prior to surgery    Drug use: No    Sexual activity: Not Currently     Partners: Male   Lifestyle    Physical activity:     Days per week: Not on file     Minutes per session: Not on file    Stress: Not on file   Relationships    Social connections:     Talks on phone: Not on file     Gets together: Not on file     Attends L[employer] service: Not on file     Active member of club or organization: Not on file     Attends meetings of clubs or organizations: Not on file     Relationship status: Not on file   Other Topics Concern    Not on file   Social History Narrative    Single, part-time teacher. Masters degree biology.      Review of Systems   Constitutional: Negative for fever and fatigue.   HENT: Negative for postnasal drip and rhinorrhea.    Eyes: Negative for redness and itching.   Respiratory: Positive for dyspnea on extertion. Negative for cough, shortness of breath, wheezing and Paroxysmal Nocturnal Dyspnea.    Cardiovascular: Negative for chest pain.   Genitourinary: Negative for difficulty urinating and hematuria.   Endocrine: Negative for polyphagia, cold intolerance and heat intolerance.    Musculoskeletal: Negative for arthralgias.   Skin: Negative for rash.   Gastrointestinal: Negative for nausea,  vomiting, abdominal pain and abdominal distention.   Neurological: Negative for dizziness and headaches.   Hematological: Negative for adenopathy. Does not bruise/bleed easily and no excessive bruising.   Psychiatric/Behavioral: The patient is not nervous/anxious.        Objective:      Physical Exam   Constitutional: She is oriented to person, place, and time. She appears well-developed and well-nourished.   HENT:   Head: Normocephalic and atraumatic.   Eyes: Pupils are equal, round, and reactive to light. Conjunctivae are normal.   Neck: Neck supple. No JVD present. No tracheal deviation present. No thyromegaly present.   Cardiovascular: Normal rate, regular rhythm and normal heart sounds.   Pulmonary/Chest: Effort normal. No respiratory distress. She has decreased breath sounds in the right lower field and the left lower field. She has no wheezes. She has no rales. She exhibits no tenderness.   Abdominal: Soft. Bowel sounds are normal.   Musculoskeletal: She exhibits no edema.   Right elbow - decrease ROM     Lymphadenopathy:     She has no cervical adenopathy.   Neurological: She is alert and oriented to person, place, and time.   Skin: Skin is warm and dry.   Nursing note and vitals reviewed.    Personal Diagnostic Review  Improvement in airflow following bronchodilator therapy suggests an asthmatic component. (FEV1>12% and >200ml  and/or FVC >12% and >200mls). Normal spirometry. (FEV1/VC greater than or equal to LLN and FVC greater than or  equal to LLN)  (Physician Final: 07/15/2019 11:07AM, Electronically signed by Dr. Nixon Mcdermott)  Physician    Chest x-ray: stable;    Radiology Result      Name:   :  Patient MRN:   Cassandra Campos Maria 1950 [id number]   Account Number: Room & Bed Accession Number:   [id number]   [id number]   Authorizing Physician: Patient Class: Diagnosis:   Nixon Mcdermott OP- Outpatient Diagnostic Testing SOB (shortness of breath) [R06.02 (ICD-10-CM)]   Procedure:  Exam Date: Reason for  Exam:   X-Ray Chest 4 Or More View 07/10/2018 None Specified             RESULTS:  EXAMINATION:  XR CHEST 4 OR MORE VIEW     CLINICAL HISTORY:  Shortness of breath     TECHNIQUE:  Four views of the chest     COMPARISON:  August 1, 2017     FINDINGS:  Heart size and mediastinal contour are normal.  Lungs are clear bilaterally.  Multilevel degenerative change in the spine.     IMPRESSION:      No acute disease.        Electronically signed by: NABEEL Davies MD  Date:                                            07/10/2018  Time:                                           08:56                    Signed By:  Sergei Davies MD on 7/10/2018  8:56 AM       Pulmonary function tests: reversible obstruction    Office Spirometry Results:      Results for RICK SAMAYOA (MRN [id number]) as of 7/10/2018 11:00   Ref. Range 7/10/2018 09:13   POC PH Latest Ref Range: 7.35 - 7.45  7.403   POC PCO2 Latest Ref Range: 35 - 45 mmHg 39.7   POC PO2 Latest Ref Range: 80 - 100 mmHg 66 (L)   POC BE Latest Ref Range: -2 to 2 mmol/L 0   POC HCO3 Latest Ref Range: 24 - 28 mmol/L 24.8   POC SATURATED O2 Latest Ref Range: 95 - 100 % 93 (L)   FiO2 Unknown 21   Sample Unknown ARTERIAL   DelSys Unknown Room Air   Allens Test Unknown Pass   Site Unknown LR   Mode Unknown SPONT         No flowsheet data found.  Pulmonary Studies Review 8/22/2019   SpO2 95   Height 63.000   Weight 3287.5   BMI (Calculated) 36.5   Predicted Distance 242.14   Predicted Distance Meters (Calculated) 386.57         Assessment:       1. Mild intermittent asthma, unspecified whether complicated    2. Pre-operative respiratory examination    3. Wheezing        Outpatient Encounter Medications as of 8/22/2019   Medication Sig Dispense Refill    ACCU-CHEK FASTCLIX Misc USE ONE TIME DAILY 100 each 3    ACCU-CHEK SMARTVIEW TEST STRIP Strp TEST ONE TIME DAILY 100 strip 3    albuterol (PROVENTIL/VENTOLIN HFA) 90 mcg/actuation inhaler Inhale 2 puffs into the lungs every 4 to  6 hours as needed for Wheezing or Shortness of Breath. Hold for refills 18 g 11    amitriptyline (ELAVIL) 100 MG tablet Take 1 tablet (100 mg total) by mouth nightly. 90 tablet 3    blood-glucose meter kit Use as instructed 1 each 0    ciclopirox (PENLAC) 8 % Soln Apply to affected toenails at night time DAILY. On 7th day, file nails down, clean all nails with alcohol and restart application process. 1 Bottle 10    eszopiclone (LUNESTA) 3 mg Tab Take 1 tablet (3 mg total) by mouth nightly as needed (sleep). 90 tablet 1    metFORMIN (GLUCOPHAGE-XR) 500 MG 24 hr tablet TAKE 2 TABLETS ONE TIME DAILY (Patient taking differently: TAKE 2 TABLETS ONE TIME nightly) 180 tablet 3    metoprolol succinate (TOPROL-XL) 50 MG 24 hr tablet Take 1 tablet (50 mg total) by mouth once daily. (Patient taking differently: Take 50 mg by mouth every evening. ) 90 tablet 3    omeprazole (PRILOSEC) 20 MG capsule Take 1 capsule (20 mg total) by mouth once daily. (Patient taking differently: Take 20 mg by mouth every evening. ) 90 capsule 3    pravastatin (PRAVACHOL) 40 MG tablet Take 1 tablet (40 mg total) by mouth nightly. 30 tablet 0    [DISCONTINUED] albuterol (PROVENTIL/VENTOLIN HFA) 90 mcg/actuation inhaler Inhale 2 puffs into the lungs every 4 to 6 hours as needed for Wheezing or Shortness of Breath. Hold for refills 18 g 11    [DISCONTINUED] albuterol 90 mcg/actuation inhaler Inhale 2 puffs into the lungs every 4 to 6 hours as needed for Wheezing. 1 Inhaler 11    [DISCONTINUED] collagenase (SANTYL) ointment Apply topically once daily. 30 g 1    [DISCONTINUED] ibuprofen (ADVIL,MOTRIN) 600 MG tablet Take 1 tablet (600 mg total) by mouth 3 (three) times daily as needed for Pain. Take with food. 30 tablet 0     No facility-administered encounter medications on file as of 8/22/2019.      Orders Placed This Encounter   Procedures    X-Ray Chest PA And Lateral     Standing Status:   Future     Standing Expiration Date:    2/22/2021     Order Specific Question:   Reason for Exam:     Answer:   SOB    Spirometry with/without bronchodilator     Standing Status:   Future     Standing Expiration Date:   8/21/2020     Plan:       Requested Prescriptions     Signed Prescriptions Disp Refills    albuterol (PROVENTIL/VENTOLIN HFA) 90 mcg/actuation inhaler 18 g 11     Sig: Inhale 2 puffs into the lungs every 4 to 6 hours as needed for Wheezing or Shortness of Breath. Hold for refills     Mild intermittent asthma, unspecified whether complicated  -     Spirometry with/without bronchodilator; Future; Expected date: 02/22/2020  -     X-Ray Chest PA And Lateral; Future; Expected date: 08/22/2019  -     albuterol (PROVENTIL/VENTOLIN HFA) 90 mcg/actuation inhaler; Inhale 2 puffs into the lungs every 4 to 6 hours as needed for Wheezing or Shortness of Breath. Hold for refills  Dispense: 18 g; Refill: 11    Pre-operative respiratory examination    Wheezing  -     Discontinue: albuterol (PROVENTIL/VENTOLIN HFA) 90 mcg/actuation inhaler; Inhale 2 puffs into the lungs every 4 to 6 hours as needed for Wheezing or Shortness of Breath. Hold for refills  Dispense: 18 g; Refill: 11  -     albuterol (PROVENTIL/VENTOLIN HFA) 90 mcg/actuation inhaler; Inhale 2 puffs into the lungs every 4 to 6 hours as needed for Wheezing or Shortness of Breath. Hold for refills  Dispense: 18 g; Refill: 11    Clearance for surgery:  The patient is at an increased risk of complications from surgery due to multiple medical problems including COPD. Vaccination status reviewed and updated. Risks of possible complications of surgery discussed in detail including risk of respiratory failure requiring mechanical ventilation, post operative pneumonia, deep venous thrombosis, pulmonary embolism and death.  Pulmonary function studies, arterial blood gases and chest X ray reports are independently reviewed. Methods of improving lung function prior to surgery including incentive  spirometry, regular use of bronchodilators, exercise and respiratory toilet discussed. Patient voices understanding of increased risks involved due to compromised pulmonary status and the need to comply with treatment plan prior to surgery.  The patient is cleared for anesthesia and surgery from a pulmonary standpoint.         Follow up in about 1 year (around 8/22/2020) for Review ami and CXR.    MEDICAL DECISION MAKING: Moderate to high complexity.  Overall, the multiple problems listed are of moderate to high severity that may impact quality of life and activities of daily living. Side effects of medications, treatment plan as well as options and alternatives reviewed and discussed with patient. There was counseling of patient concerning these issues.    Total time spent in face to face counseling and coordination of care - 25 minutes over 50% of time was used in discussion of prognosis, risks, benefits of treatment, instructions and compliance with regimen . Discussion with other physicians or health care providers (DME, NP, pharmacy, respiratory therapy) occurred.

## 2019-08-22 NOTE — PROGRESS NOTES
Subjective:   Patient ID:  Cassandra Campos is a 69 y.o. female who presents for follow up of Tachycardia        68 yo, female, came in fro 1yr f/u  PMH PSVT, HTN, HLP, NIDDM 10 yrs, obesity, ex smoker, DANIEL, not on CPAP.   Had bariatric surgery done in . Uncomplicated and last 30 pounds.  Walks 2 miles daily.   No smoking and occasional drink  Lives alone and no limitation of exercise  Patient feels OK, no chest pain, no sob, no palpitation, no dizziness, no syncope, no CNS symptoms.   EKG NSR  Plan to see orthopedic for possible carpal tunnel syndromes                         Past Medical History:   Diagnosis Date    Arthritis     hands    Borderline glaucoma     Gastritis     upper GI 2/2017    Hydradenitis     Hyperlipidemia     Hypertension     Insomnia     Migraines 02/01/2000    Morbid obesity due to excess calories 11/1/2016    Nasal septum perforation     Obesity     Pneumonia     Restrictive airway disease     Sleep apnea     SVT (supraventricular tachycardia) 09/2013    Type 2 diabetes mellitus     Type II or unspecified type diabetes mellitus without mention of complication, not stated as uncontrolled 05/20/2013       Past Surgical History:   Procedure Laterality Date    ARTHROGRAM Left 5/25/2016    [facility]    ARTHROSCOPY-KNEE Right 6/16/2017    [facility]    ARTHROSCOPY-KNEE W/ CHONDROPLASTY Right 6/16/2017    [facility]    ARTHROSCOPY-MENISCECTOMY Right 6/16/2017    [facility]    AXILLARY HIDRADENITIS EXCISION      BREAST BIOPSY Bilateral     BREAST LUMPECTOMY Bilateral 07/1998    Benign    BUNIONECTOMY, WITH METATARSAL OSTEOTOMY Right 6/28/2019    [facility]formed by [facility]ivasan [facility]ueva DPM at [facility] OR    BUNIONECTOMY, WITH METATARSAL OSTEOTOMY Left 5/10/2019    [facility] OR    BURSECTOMY,  OLECRANON Right 2018    [facility] OR    CARPAL TUNNEL RELEASE      bilateral    CATARACT EXTRACTION Bilateral     OU     SECTION  1979    CHOLECYSTECTOMY  2014    CYST REMOVAL  2015    sebaceous cyst removed from face    ESOPHAGOGASTRODUODENOSCOPY (EGD) N/A 2017    Performed by Rashaad Watson MD at [facility] ENDO    EXCISION, EXOSTOSIS Right 2018    [facility] at [facility] OR    gastric sleeve  2017    Dr. Watson    KNEE SURGERY Right     MICROFRACTURE Right 2017    [facility] at [facility] OR    RELEASE-FINGER-TRIGGER Right 2017    [facility] at [facility] OR    RELEASE-FINGER-TRIGGER Right 2015    Performed by [facility] Sr[facility] at [facility] OR    SYNOVECTOMY-KNEE Right 2017    Per[facility] at [facility] OR    TENOSYNOVECTOMY Right 2017    [facility] at [facility] OR    TENOSYNOVECTOMY Right 2015    [facility] by [facility] at [facility] OR    TONSILLECTOMY, ADENOIDECTOMY  1980s    TRIGGER FINGER RELEASE Right 2015    Dr. Pedraza    XI ROBOT ASSISTED LAPAROSCOPIC SLEEVE-GASTRECTOMY N/A 2017    Performed by R[facility]ad Watson MD at [facility] OR       Social History     Tobacco Use    Smoking status: Former Smoker     Packs/day: 0.50     Years: 30.00     Pack years: 15.00     Types: Cigarettes     Start date: 1970     Last attempt to quit: 2012     Years since quittin.6    Smokeless tobacco: Never Used   Substance Use Topics    Alcohol use: Yes     Comment: rarely // No alcohol 72 HOURS prior to surgery    Drug use: No       Family History   Problem Relation Age of Onset    Prostate cancer Brother     Diabetes Maternal Aunt          Review of Systems   Constitution: Negative for decreased appetite, diaphoresis, fever, malaise/fatigue and night sweats.   HENT: Negative for nosebleeds.    Eyes:  Negative for blurred vision and double vision.   Cardiovascular: Negative for chest pain, claudication, dyspnea on exertion, irregular heartbeat, leg swelling, near-syncope, orthopnea, palpitations, paroxysmal nocturnal dyspnea and syncope.   Respiratory: Negative for cough, shortness of breath, sleep disturbances due to breathing, snoring, sputum production and wheezing.    Endocrine: Negative for cold intolerance and polyuria.   Hematologic/Lymphatic: Does not bruise/bleed easily.   Skin: Negative for rash.   Musculoskeletal: Negative for back pain, falls, joint pain, joint swelling and neck pain.        Right index finger pain   Gastrointestinal: Negative for abdominal pain, heartburn, nausea and vomiting.   Genitourinary: Negative for dysuria, frequency and hematuria.   Neurological: Negative for difficulty with concentration, dizziness, focal weakness, headaches, light-headedness, numbness, seizures and weakness.   Psychiatric/Behavioral: Negative for depression, memory loss and substance abuse. The patient does not have insomnia.    Allergic/Immunologic: Negative for HIV exposure and hives.       Objective:   Physical Exam   Constitutional: She is oriented to person, place, and time. She appears well-nourished.   HENT:   Head: Normocephalic.   Eyes: Pupils are equal, round, and reactive to light.   Neck: Normal carotid pulses and no JVD present. Carotid bruit is not present. No thyromegaly present.   Cardiovascular: Normal rate, regular rhythm, normal heart sounds and normal pulses.  No extrasystoles are present. PMI is not displaced. Exam reveals no gallop and no S3.   No murmur heard.  Pulmonary/Chest: Breath sounds normal. No stridor. No respiratory distress.   Abdominal: Soft. Bowel sounds are normal. There is no tenderness. There is no rebound.   Musculoskeletal: Normal range of motion.   Neurological: She is alert and oriented to person, place, and time.   Skin: Skin is intact. No rash noted.    Psychiatric: Her behavior is normal.       Lab Results   Component Value Date    CHOL 132 08/14/2018    CHOL 169 08/01/2017    CHOL 170 11/28/2016     Lab Results   Component Value Date    HDL 35 (L) 08/14/2018    HDL 40 08/01/2017    HDL 30 (L) 11/28/2016     Lab Results   Component Value Date    LDLCALC 61.0 (L) 08/14/2018    LDLCALC 96.0 08/01/2017    LDLCALC 98.4 11/28/2016     Lab Results   Component Value Date    TRIG 180 (H) 08/14/2018    TRIG 165 (H) 08/01/2017    TRIG 208 (H) 11/28/2016     Lab Results   Component Value Date    CHOLHDL 26.5 08/14/2018    CHOLHDL 23.7 08/01/2017    CHOLHDL 17.6 (L) 11/28/2016       Chemistry        Component Value Date/Time     06/18/2019 1419    K 4.0 06/18/2019 1419     06/18/2019 1419    CO2 25 06/18/2019 1419    BUN 12 06/18/2019 1419    CREATININE 0.8 06/18/2019 1419     (H) 06/18/2019 1419        Component Value Date/Time    CALCIUM 10.4 06/18/2019 1419    ALKPHOS 126 06/18/2019 1419    AST 35 06/18/2019 1419    ALT 37 06/18/2019 1419    BILITOT 0.3 06/18/2019 1419    ESTGFRAFRICA >60.0 06/18/2019 1419    EGFRNONAA >60.0 06/18/2019 1419          Lab Results   Component Value Date    HGBA1C 6.2 (H) 06/18/2019     Lab Results   Component Value Date    TSH 2.386 11/28/2016     No results found for: INR, PROTIME  Lab Results   Component Value Date    WBC 7.39 06/18/2019    HGB 13.7 06/18/2019    HCT 43.3 06/18/2019    MCV 78 (L) 06/18/2019     06/18/2019     BMP  Sodium   Date Value Ref Range Status   06/18/2019 141 136 - 145 mmol/L Final     Potassium   Date Value Ref Range Status   06/18/2019 4.0 3.5 - 5.1 mmol/L Final     Chloride   Date Value Ref Range Status   06/18/2019 104 95 - 110 mmol/L Final     CO2   Date Value Ref Range Status   06/18/2019 25 23 - 29 mmol/L Final     BUN, Bld   Date Value Ref Range Status   06/18/2019 12 8 - 23 mg/dL Final     Creatinine   Date Value Ref Range Status   06/18/2019 0.8 0.5 - 1.4 mg/dL Final     Calcium    Date Value Ref Range Status   06/18/2019 10.4 8.7 - 10.5 mg/dL Final     Anion Gap   Date Value Ref Range Status   06/18/2019 12 8 - 16 mmol/L Final     eGFR if    Date Value Ref Range Status   06/18/2019 >60.0 >60 mL/min/1.73 m^2 Final     eGFR if non    Date Value Ref Range Status   06/18/2019 >60.0 >60 mL/min/1.73 m^2 Final     Comment:     Calculation used to obtain the estimated glomerular filtration  rate (eGFR) is the CKD-EPI equation.        BNP  @LABRCNTIP(BNP,BNPTRIAGEBLO)@  @LABRCNTIP(troponini)@  CrCl cannot be calculated (Patient's most recent lab result is older than the maximum 7 days allowed.).  No results found in the last 24 hours.  No results found in the last 24 hours.  No results found in the last 24 hours.    Assessment:      1. Essential hypertension    2. Hyperlipidemia associated with type 2 diabetes mellitus    3. Paroxysmal SVT (supraventricular tachycardia)    4. Controlled type 2 diabetes mellitus without complication, without long-term current use of insulin    5. Obesity (BMI 30.0-34.9)    6. Unequal blood pressures in paired extremities      Right arm BP low    Plan:   RUE arterial US  Continue BB and statin  Continue current meds.  Recommend heart-healthy diet, weight control and regular exercise.  Adrian. Risk modification.   RTC in 1 yr    I have reviewed all pertinent labs and cardiac studies. Plans and recommendations have been formulated under my direct supervision. All questions answered and patient voiced understanding. Patient to continue current medications.

## 2019-08-22 NOTE — LETTER
August 22, 2019      Denia Maldonado MD  [address] [place] [address] [address] [place] [address]           [facility]  [address] [facility]  [place] [address]  Phone: [phone]  Fax: [phone]          Patient: Cassandra Campos   MR Number: [id number]   YOB: 1950   Date of Visit: 8/22/2019       Dear Dr. Denia Maldonado:    Thank you for referring Cassandra Campos to me for evaluation. Attached you will find relevant portions of my assessment and plan of care.    If you have questions, please do not hesitate to call me. I look forward to following Cassandra Campos along with you.    Sincerely,    Twan Pelaez MD    Enclosure  CC:  No Recipients    If you would like to receive this communication electronically, please contact [email or web address] or [phone] to request more information on [employer] Link access.    For providers and/or their staff who would like to refer a patient to [facility], please contact us through our one-stop-shop provider referral line, [facility], at [phone].    If you feel you have received this communication in error or would no longer like to receive these types of communications, please e-mail [email or web address]

## 2019-08-22 NOTE — PROGRESS NOTES
Subjective:       Patient ID: Cassandra Campos is a 69 y.o. female.    Chief Complaint: Follow-up (follow/up for bunions; patient reports no pain at present. )      HPI:  Cassandra Campos presents to the office today, s/p 6/28/19 RLE Nhan bunionectomy. Patient is also s/p 5/10/2019 LLE bunionectomy. Patient states no pains this time, but states mild persistent swelling. Patient is a well controlled DMII w/o complications. Denia Maldonado MD is her primary care provider.    Hemoglobin A1C   Date Value Ref Range Status   06/18/2019 6.2 (H) 4.0 - 5.6 % Final     Comment:     ADA Screening Guidelines:  5.7-6.4%  Consistent with prediabetes  >or=6.5%  Consistent with diabetes  High levels of fetal hemoglobin interfere with the HbA1C  assay. Heterozygous hemoglobin variants (HbS, HgC, etc)do  not significantly interfere with this assay.   However, presence of multiple variants may affect accuracy.     02/19/2019 6.1 (H) 4.0 - 5.6 % Final     Comment:     ADA Screening Guidelines:  5.7-6.4%  Consistent with prediabetes  >or=6.5%  Consistent with diabetes  High levels of fetal hemoglobin interfere with the HbA1C  assay. Heterozygous hemoglobin variants (HbS, HgC, etc)do  not significantly interfere with this assay.   However, presence of multiple variants may affect accuracy.     08/14/2018 5.7 (H) 4.0 - 5.6 % Final     Comment:     ADA Screening Guidelines:  5.7-6.4%  Consistent with prediabetes  >or=6.5%  Consistent with diabetes  High levels of fetal hemoglobin interfere with the HbA1C  assay. Heterozygous hemoglobin variants (HbS, HgC, etc)do  not significantly interfere with this assay.   However, presence of multiple variants may affect accuracy.         Review of patient's allergies indicates:  No Known Allergies    Past Medical History:   Diagnosis Date    Arthritis     hands    Borderline glaucoma     Gastritis     upper GI 2/2017    Hydradenitis     Hyperlipidemia     Hypertension     Insomnia      Migraines 2000    Morbid obesity due to excess calories 2016    Nasal septum perforation     Obesity     Pneumonia     Restrictive airway disease     Sleep apnea     SVT (supraventricular tachycardia) 2013    Type 2 diabetes mellitus     Type II or unspecified type diabetes mellitus without mention of complication, not stated as uncontrolled 2013       Family History   Problem Relation Age of Onset    Prostate cancer Brother     Diabetes Maternal Aunt        Social History     Socioeconomic History    Marital status:      Spouse name: Not on file    Number of children: 1    Years of education: Not on file    Highest education level: Not on file   Occupational History    Occupation:  aid   Social Needs    Financial resource strain: Not on file    Food insecurity:     Worry: Not on file     Inability: Not on file    Transportation needs:     Medical: Not on file     Non-medical: Not on file   Tobacco Use    Smoking status: Former Smoker     Packs/day: 0.50     Years: 30.00     Pack years: 15.00     Types: Cigarettes     Start date: 1970     Last attempt to quit: 2012     Years since quittin.6    Smokeless tobacco: Never Used   Substance and Sexual Activity    Alcohol use: Yes     Comment: rarely // No alcohol 72 HOURS prior to surgery    Drug use: No    Sexual activity: Not Currently     Partners: Male   Lifestyle    Physical activity:     Days per week: Not on file     Minutes per session: Not on file    Stress: Not on file   Relationships    Social connections:     Talks on phone: Not on file     Gets together: Not on file     Attends Quaker service: Not on file     Active member of club or organization: Not on file     Attends meetings of clubs or organizations: Not on file     Relationship status: Not on file   Other Topics Concern    Not on file   Social History Narrative    Single, part-time teacher. Masters degree biology.         Past Surgical History:   Procedure Laterality Date    ARTHROGRAM Left 2016    [facility] OR    ARTHROSCOPY-KNEE Right 2017    [facility]    ARTHROSCOPY-KNEE W/ CHONDROPLASTY Right 2017    [facility]    ARTHROSCOPY-MENISCECTOMY Right 2017    [facility]    AXILLARY HIDRADENITIS EXCISION      BREAST BIOPSY Bilateral     BREAST LUMPECTOMY Bilateral 1998    Benign    BUNIONECTOMY, WITH METATARSAL OSTEOTOMY Right 2019    [facility] at [facility]    BUNIONECTOMY, WITH METATARSAL OSTEOTOMY Left 5/10/2019    [facility]    BURSECTOMY, OLECRANON Right 2018    [facility] by [facility] at [facility]    CARPAL TUNNEL RELEASE      bilateral    CATARACT EXTRACTION Bilateral     OU     SECTION  1979    CHOLECYSTECTOMY  2014    CYST REMOVAL  2015    sebaceous cyst removed from face    ESOPHAGOGASTRODUODENOSCOPY (EGD) N/A 2017    [facility] by [facility]ad Watson [facility] at [facility] ENDO    EXCISION, EXOSTOSIS Right 2018    [facility]    gastric sleeve  2017    Dr. Watson    KNEE SURGERY Right     MICROFRACTURE Right 2017    [facility]    RELEASE-FINGER-TRIGGER Right 2017    [facility]    RELEASE-FINGER-TRIGGER Right 2015    [facility] at [facility]    SYNOVECTOMY-KNEE Right 2017    Performed by [facility] Sr[facility] at [facility]    TENOSYNOVECTOMY Right 2017    [facility]    TENOSYNOVECTOMY Right 2015    [facility] at [facility]    TONSILLECTOMY, ADENOIDECTOMY  1980s    TRIGGER FINGER RELEASE Right 2015    Dr. Pedraza    XI ROBOT ASSISTED  "LAPAROSCOPIC SLEEVE-GASTRECTOMY N/A 2/13/2017    Performed by [facility] at [facility] OR       Review of Systems   Constitutional: Negative for chills, fatigue and fever.   HENT: Negative for hearing loss.    Eyes: Negative for photophobia and visual disturbance.   Respiratory: Negative for cough, chest tightness, shortness of breath and wheezing.    Cardiovascular: Negative for chest pain and palpitations.   Gastrointestinal: Negative for constipation, diarrhea, nausea and vomiting.   Endocrine: Negative for cold intolerance and heat intolerance.   Genitourinary: Negative for flank pain.   Musculoskeletal: Negative for gait problem, neck pain and neck stiffness.   Skin: Negative for wound.   Neurological: Negative for light-headedness and headaches.   Psychiatric/Behavioral: Negative for sleep disturbance.          Objective:   /84 (BP Location: Left arm, Patient Position: Sitting)   Pulse 98   Ht 5' 3" (1.6 m)   Wt 93 kg (205 lb)   BMI 36.31 kg/m²       Physical Exam  LOWER EXTREMITY PHYSICAL EXAMINATION    NEUROLOGY: Proprioception is intact. Sensation to light touch is intact. Lisa-incisional sensation is intact.    VASCULAR: Hair growth is sparse to absent on the dorsum of the foot and at the digits. On the  left and right  foot, the dorsalis pedis pulse is 2/4 and the posterior tibial pulse is 1/4. Capillary refill time is less than 3 seconds.  No rubor is present. Proximal to distal temperature is warm to warm.      DERMATOLOGY: Skin is supple, dry and intact. No ecchymosis is noted. No hypertrophic skin formation. No erythema or cellulitis is noted. No calor is noted.      ORTHOPEDIC: Anatomic alignment to the bilateral 1st ray is noted.       Assessment:     1. Hallux valgus (acquired), right foot    2. Pain in right foot    3. Controlled type 2 diabetes mellitus without complication, without long-term current use of insulin    4. Severe obesity (BMI 35.0-39.9) with comorbidity    5. Vitamin D " deficiency          Plan:     Hallux valgus (acquired), right foot  Pain in right foot  No signs of complications postoperatively. Patient is discharged at present, and is advised to follow up as needed or in the event of symptomology.    Controlled type 2 diabetes mellitus without complication, without long-term current use of insulin  I counseled the patient on his/her Diabetic Mellitus regarding today's clinical examination and objection findings. We did also discuss recent medication changes, pertinent labs and imaging evaluations and other medical consultation notes and progress notes. Greater than 50% of this visit was spent on counseling and coordination of care. Greater than 20 minutes of this appt. was spent on education about the diabetic foot, in relation to PVD and/or neuropathy, and the prevention of limb loss.     Shoe gear is inspected and wear and proper fit/type. Patient is reminded of the importance of good nutrition and blood sugar control to help prevent podiatric complications of diabetes. Patient instructed on proper foot hygeine. We discussed wearing proper shoe gear, daily foot inspections, never walking without protective shoe gear, never putting sharp instruments to feet.  Patient  will continue to monitor the areas daily, inspect feet, wear protective shoe gear when ambulatory, moisturizer to maintain skin integrity.     Patient's DMI/DMII is managed by Primary Care Provider and/or Endocrinology Advanced Practice Provider. As per the most recent glycohemoglobin level, this patient is at goal.    Severe obesity (BMI 35.0-39.9) with comorbidity  Patient is counseled and reminded concerning the importance of good nutrition and healthy eating habits, which may include blood sugar control, to prevent and/or help podiatric foot and ankle complications.    Vitamin D deficiency  Continue oral supplementation.        Future Appointments   Date Time Provider Department Center   8/23/2019 11:15 AM  [facility] XR1-DR ALEKSANDER [place]   8/23/2019 11:30 AM [facility]

## 2019-08-22 NOTE — LETTER
August 22, 2019      Jaime Oswald MD  [address]  [place] [address]           [facility]  [address] [facility] [address]  Phone: [phone]  Fax: [phone]          Patient: Cassandra Campos   MR Number: [id number]   YOB: 1950   Date of Visit: 8/22/2019           Thank you for referring Cassandra Campos to me for evaluation. Attached you will find relevant portions of my assessment and plan of care.    If you have questions, please do not hesitate to call me. I look forward to following Cassandra Campos along with you.    Sincerely,    Nixon Mcdermott MD    Enclosure  CC:  Denia Maldonado MD    IIf you would like to receive this communication electronically, please contact [email or web address] or [phone] to request [employer] Link access.    [employer] Link is a tool which provides read-only access to select patient information with whom you have a relationship. Its easy to use and provides real time access to review your patients record including encounter summaries, notes, results, and demographic information.    If you feel you have received this communication in error or would no longer like to receive these types of communications, please e-mail [email or web address]

## 2019-08-23 ENCOUNTER — TELEPHONE (OUTPATIENT)
Dept: INTERNAL MEDICINE | Facility: CLINIC | Age: 69
End: 2019-08-23

## 2019-08-23 ENCOUNTER — HOSPITAL ENCOUNTER (OUTPATIENT)
Dept: RADIOLOGY | Facility: HOSPITAL | Age: 69
Discharge: HOME | End: 2019-08-23
Attending: ORTHOPAEDIC SURGERY
Payer: MEDICARE

## 2019-08-23 ENCOUNTER — OFFICE VISIT (OUTPATIENT)
Dept: ORTHOPEDICS | Facility: CLINIC | Age: 69
End: 2019-08-23
Payer: MEDICARE

## 2019-08-23 VITALS
HEART RATE: 111 BPM | WEIGHT: 205 LBS | SYSTOLIC BLOOD PRESSURE: 108 MMHG | BODY MASS INDEX: 36.32 KG/M2 | DIASTOLIC BLOOD PRESSURE: 78 MMHG | HEIGHT: 63 IN

## 2019-08-23 DIAGNOSIS — M65.321 TRIGGER INDEX FINGER OF RIGHT HAND: ICD-10-CM

## 2019-08-23 DIAGNOSIS — M79.641 PAIN OF RIGHT HAND: ICD-10-CM

## 2019-08-23 DIAGNOSIS — M77.8 TENDINITIS OF RIGHT WRIST: Primary | ICD-10-CM

## 2019-08-23 PROCEDURE — 73130 X-RAY EXAM OF HAND: CPT | Mod: TC,HCNC,RT

## 2019-08-23 PROCEDURE — 20550 TENDON SHEATH: ICD-10-PCS | Mod: F6,HCNC,S$GLB, | Performed by: ORTHOPAEDIC SURGERY

## 2019-08-23 PROCEDURE — 99214 OFFICE O/P EST MOD 30 MIN: CPT | Mod: 25,HCNC,S$GLB, | Performed by: ORTHOPAEDIC SURGERY

## 2019-08-23 PROCEDURE — 20550 NJX 1 TENDON SHEATH/LIGAMENT: CPT | Mod: F6,HCNC,S$GLB, | Performed by: ORTHOPAEDIC SURGERY

## 2019-08-23 PROCEDURE — 1101F PT FALLS ASSESS-DOCD LE1/YR: CPT | Mod: HCNC,CPTII,S$GLB, | Performed by: ORTHOPAEDIC SURGERY

## 2019-08-23 PROCEDURE — 73130 XR HAND COMPLETE 3 VIEW RIGHT: ICD-10-PCS | Mod: 26,HCNC,RT, | Performed by: RADIOLOGY

## 2019-08-23 PROCEDURE — 1101F PR PT FALLS ASSESS DOC 0-1 FALLS W/OUT INJ PAST YR: ICD-10-PCS | Mod: HCNC,CPTII,S$GLB, | Performed by: ORTHOPAEDIC SURGERY

## 2019-08-23 PROCEDURE — 3078F PR MOST RECENT DIASTOLIC BLOOD PRESSURE < 80 MM HG: ICD-10-PCS | Mod: HCNC,CPTII,S$GLB, | Performed by: ORTHOPAEDIC SURGERY

## 2019-08-23 PROCEDURE — 3074F SYST BP LT 130 MM HG: CPT | Mod: HCNC,CPTII,S$GLB, | Performed by: ORTHOPAEDIC SURGERY

## 2019-08-23 PROCEDURE — 99999 PR PBB SHADOW E&M-EST. PATIENT-LVL III: CPT | Mod: PBBFAC,HCNC,, | Performed by: ORTHOPAEDIC SURGERY

## 2019-08-23 PROCEDURE — 73130 X-RAY EXAM OF HAND: CPT | Mod: 26,HCNC,RT, | Performed by: RADIOLOGY

## 2019-08-23 PROCEDURE — 3078F DIAST BP <80 MM HG: CPT | Mod: HCNC,CPTII,S$GLB, | Performed by: ORTHOPAEDIC SURGERY

## 2019-08-23 PROCEDURE — 3074F PR MOST RECENT SYSTOLIC BLOOD PRESSURE < 130 MM HG: ICD-10-PCS | Mod: HCNC,CPTII,S$GLB, | Performed by: ORTHOPAEDIC SURGERY

## 2019-08-23 PROCEDURE — 99999 PR PBB SHADOW E&M-EST. PATIENT-LVL III: ICD-10-PCS | Mod: PBBFAC,HCNC,, | Performed by: ORTHOPAEDIC SURGERY

## 2019-08-23 PROCEDURE — 99214 PR OFFICE/OUTPT VISIT, EST, LEVL IV, 30-39 MIN: ICD-10-PCS | Mod: 25,HCNC,S$GLB, | Performed by: ORTHOPAEDIC SURGERY

## 2019-08-23 NOTE — LETTER
August 23, 2019      Denia Maldonado MD  [address] [place] [address] [address] [place] [address]           [facility]  [address] [place] [address]  [place] [address]  Phone: [phone]  Fax: [phone]          Patient: Cassandra Campos   MR Number: [id number]   YOB: 1950   Date of Visit: 8/23/2019       Dear Dr. Denia Maldonado:    Thank you for referring Cassandra Campos to me for evaluation. Attached you will find relevant portions of my assessment and plan of care.    If you have questions, please do not hesitate to call me. I look forward to following Cassandra Campos along with you.    Sincerely,    Jaime Oswald MD    Enclosure  CC:  No Recipients    If you would like to receive this communication electronically, please contact [email or web address] or [phone] to request more information on [employer] Link access.    For providers and/or their staff who would like to refer a patient to [facility], please contact us through our one-stop-shop provider referral line, [facility], at [phone].    If you feel you have received this communication in error or would no longer like to receive these types of communications, please e-mail [email or web address]

## 2019-08-23 NOTE — TELEPHONE ENCOUNTER
----- Message from Jina Arndt sent at 8/23/2019 11:16 AM CDT -----  Patient would like orders to be put in so she could do her labs..[phone] (home)

## 2019-08-23 NOTE — TELEPHONE ENCOUNTER
Spoke to Zhang and was advise that the patient came on yesterday to have her fasting labs completed.  Patient did not mention that she had a coke zero until after completing the labs and leaving.  The patient returned to the lab this morning advising Zhang that she drunk a coke zero and wants to re-do her labs.  I advised Zhang that  was out of the office and I will send a message to her to have look at the patient lab results from yesterday and will call the patient to advise the next step.

## 2019-08-23 NOTE — PROCEDURES
Tendon Sheath  Date/Time: 8/23/2019 12:49 PM  Performed by: Jaime Oswald MD  Authorized by: Jaime Oswald MD     Timeout: prior to procedure the correct patient, procedure, and site was verified    Indications:  Pain  Timeout: prior to procedure the correct patient, procedure, and site was verified    Location:  Index finger  Needle size:  25 G  Medications:  32 mg triamcinolone acetonide 32 mg

## 2019-08-23 NOTE — PROGRESS NOTES
Subjective:     Patient ID: Cassandra Campos is a 69 y.o. female.    Chief Complaint: Pain of the Right Hand    The patient is 69-year-old female with right wrist de Quervain tendonitis and right index trigger finger unresponsive to conservative measures that she wished to have injected.  She is status post a right thumb and long finger trigger finger release by       Past Medical History:   Diagnosis Date    Arthritis     hands    Borderline glaucoma     Gastritis     upper GI 2017    Hydradenitis     Hyperlipidemia     Hypertension     Insomnia     Migraines 2000    Morbid obesity due to excess calories 2016    Nasal septum perforation     Obesity     Pneumonia     Restrictive airway disease     Sleep apnea     SVT (supraventricular tachycardia) 2013    Type 2 diabetes mellitus     Type II or unspecified type diabetes mellitus without mention of complication, not stated as uncontrolled 2013     Past Surgical History:   Procedure Laterality Date    ARTHROGRAM Left 2016    Performed by [facility] Sr[facility] at [facility]    ARTHROSCOPY-KNEE Right 2017    Performed by [facility]    ARTHROSCOPY-KNEE W/ CHONDROPLASTY Right 2017    [facility]    ARTHROSCOPY-MENISCECTOMY Right 2017    [facility]    AXILLARY HIDRADENITIS EXCISION      BREAST BIOPSY Bilateral     BREAST LUMPECTOMY Bilateral 1998    Benign    BUNIONECTOMY, WITH METATARSAL OSTEOTOMY Right 2019    Performed by [facility]an [facility]va DPM at [facility]    BUNIONECTOMY, WITH METATARSAL OSTEOTOMY Left 5/10/2019    Performed [facility] at [facility]    BURSECTOMY, OLECRANON Right 2018    Performed by [facility] Sr[facility]    CARPAL TUNNEL RELEASE      bilateral    CATARACT EXTRACTION Bilateral     OU     SECTION  1979    CHOLECYSTECTOMY  2014    CYST  REMOVAL  2015    sebaceous cyst removed from face    ESOPHAGOGASTRODUODENOSCOPY (EGD) N/A 2017    Per[facility] at [facility]    EXCISION, EXOSTOSIS Right 2018    Performed by [facility] Sr[facility] at [facility]    gastric sleeve  2017    Dr. Watson    KNEE SURGERY Right     MICROFRACTURE Right 2017    Performed [facility] Sr[facility] at [facility]    RELEASE-FINGER-TRIGGER Right 2017    Performed by [facility] Sr[facility] MD at [facility]    RELEASE-FINGER-TRIGGER Right 2015    Performed by [facility] Sr[facility] MD at [facility]    SYNOVECTOMY-KNEE Right 2017    Performed by [facility] Sr[facility] at [facility]    TENOSYNOVECTOMY Right 2017    Performed by [facility] Sr[facility] at [facility]    TENOSYNOVECTOMY Right 2015    Performed by [facility] Sr[facility] at [facility]    TONSILLECTOMY, ADENOIDECTOMY  1980s    TRIGGER FINGER RELEASE Right 2015    Dr. Pedraza    XI ROBOT ASSISTED LAPAROSCOPIC SLEEVE-GASTRECTOMY N/A 2017    Performed by Rashaad Watson MD at [facility]     Family History   Problem Relation Age of Onset    Prostate cancer Brother     Diabetes Maternal Aunt      Social History     Socioeconomic History    Marital status:      Spouse name: Not on file    Number of children: 1    Years of education: Not on file    Highest education level: Not on file   Occupational History    Occupation:  aid   Social Needs    Financial resource strain: Not on file    Food insecurity:     Worry: Not on file     Inability: Not on file    Transportation needs:     Medical: Not on file     Non-medical: Not on file   Tobacco Use    Smoking status: Former Smoker     Packs/day: 0.50     Years: 30.00     Pack years: 15.00     Types: Cigarettes     Start date: 1970     Last attempt to quit: 2012     Years since quittin.6    Smokeless tobacco: Never Used   Substance and Sexual Activity    Alcohol use: Yes     Comment:  rarely // No alcohol 72 HOURS prior to surgery    Drug use: No    Sexual activity: Not Currently     Partners: Male   Lifestyle    Physical activity:     Days per week: Not on file     Minutes per session: Not on file    Stress: Not on file   Relationships    Social connections:     Talks on phone: Not on file     Gets together: Not on file     Attends Roman Catholic service: Not on file     Active member of club or organization: Not on file     Attends meetings of clubs or organizations: Not on file     Relationship status: Not on file   Other Topics Concern    Not on file   Social History Narrative    Single, part-time teacher. Masters degree biology.      Medication List with Changes/Refills   Current Medications    ACCU-CHEK FASTCLIX MISC    USE ONE TIME DAILY    ACCU-CHEK SMARTVIEW TEST STRIP STRP    TEST ONE TIME DAILY    ALBUTEROL (PROVENTIL/VENTOLIN HFA) 90 MCG/ACTUATION INHALER    Inhale 2 puffs into the lungs every 4 to 6 hours as needed for Wheezing or Shortness of Breath. Hold for refills    AMITRIPTYLINE (ELAVIL) 100 MG TABLET    Take 1 tablet (100 mg total) by mouth nightly.    BLOOD-GLUCOSE METER KIT    Use as instructed    CICLOPIROX (PENLAC) 8 % SOLN    Apply to affected toenails at night time DAILY. On 7th day, file nails down, clean all nails with alcohol and restart application process.    ESZOPICLONE (LUNESTA) 3 MG TAB    Take 1 tablet (3 mg total) by mouth nightly as needed (sleep).    METFORMIN (GLUCOPHAGE-XR) 500 MG 24 HR TABLET    TAKE 2 TABLETS ONE TIME DAILY    METOPROLOL SUCCINATE (TOPROL-XL) 50 MG 24 HR TABLET    Take 1 tablet (50 mg total) by mouth once daily.    OMEPRAZOLE (PRILOSEC) 20 MG CAPSULE    Take 1 capsule (20 mg total) by mouth once daily.    PRAVASTATIN (PRAVACHOL) 40 MG TABLET    Take 1 tablet (40 mg total) by mouth nightly.     Review of patient's allergies indicates:  No Known Allergies  Review of Systems   Constitution: Negative for chills and decreased appetite.    HENT: Negative for hearing loss.    Eyes: Negative for double vision and visual disturbance.   Cardiovascular: Positive for palpitations. Negative for chest pain.   Endocrine: Negative for cold intolerance.   Hematologic/Lymphatic: Does not bruise/bleed easily.   Skin: Negative for poor wound healing and suspicious lesions.   Musculoskeletal: Positive for arthritis, joint pain and joint swelling. Negative for gout.   Gastrointestinal: Positive for heartburn. Negative for nausea and vomiting.   Genitourinary: Negative for dysuria.   Neurological: Negative for numbness, paresthesias, seizures and sensory change.   Psychiatric/Behavioral: Negative for depression, memory loss, substance abuse, suicidal ideas and thoughts of violence.   Allergic/Immunologic: Negative for HIV exposure and persistent infections.       Objective:   Body mass index is 36.32 kg/m².  Vitals:    08/23/19 1139   BP: 108/78   Pulse: (!) 111                General    Constitutional: She is oriented to person, place, and time. She appears well-developed and well-nourished.   HENT:   Head: Normocephalic.   Eyes: EOM are normal. Pupils are equal, round, and reactive to light.   Neck: Normal range of motion.   Pulmonary/Chest: Effort normal.   Neurological: She is oriented to person, place, and time.   Psychiatric: She has a normal mood and affect.             Right Hand/Wrist Exam     Inspection   Scars: Wrist - absent   Effusion: Hand -  absent  Deformity: Wrist - deformity Hand -  deformity    Tests   Finkelstein's test: positive      Other     Neuorologic Exam    Median Distribution: normal  Ulnar Distribution: normal  Radial Distribution: normal    Comments:  The patient had tenderness of the 1st dorsal extensor tendon compartment and a positive Finkelstein test.  Additionally she had active triggering of the right index finger with a palpable nodule that moves with tendon excursion.  There are no motor or sensory deficits.          Vascular  Exam       Capillary Refill  Right Hand: normal capillary refill      Relevant imaging results reviewed and interpreted by me, discussed with the patient and / or family today radiographs of the right wrist showed only early degenerative change  Assessment:     Encounter Diagnoses   Name Primary?    Tendinitis of right wrist Yes    Trigger index finger of right hand         Plan:       The patient was injected with 0.5 cc of plain lidocaine and 0.5 cc of Kenalog both in the 1st dorsal extensor tendon compartment and the tendon sheath of the index finger of flexor tendon. This is done under sterile technique. She will return in 3 weeks if not improved.  She was told that may end up with a surgery if the injection did not work.            Disclaimer: This note was prepared using a voice recognition system and is likely to have sound alike errors within the text.

## 2019-08-25 NOTE — TELEPHONE ENCOUNTER
No repeat lab needed.  Coke Zero has no calories/no carbs/no caffeine. Her numbers were very good - see lab results.  She is due for her DM visit.  Please schedule. The microalbumin urine test was not checked for some reason.  We will check it at her visit.

## 2019-08-26 ENCOUNTER — TELEPHONE (OUTPATIENT)
Dept: CARDIOLOGY | Facility: CLINIC | Age: 69
End: 2019-08-26

## 2019-08-26 ENCOUNTER — PATIENT MESSAGE (OUTPATIENT)
Dept: INTERNAL MEDICINE | Facility: CLINIC | Age: 69
End: 2019-08-26

## 2019-08-26 NOTE — TELEPHONE ENCOUNTER
Called and left v/m arm ultrasound was normal. Call if any questions. Cm  ----- Message from Twan Pelaez MD sent at 8/23/2019  3:41 PM CDT -----  Arm us normal

## 2019-08-28 RX ORDER — AMITRIPTYLINE HYDROCHLORIDE 100 MG/1
TABLET ORAL
Qty: 30 TABLET | Refills: 2 | OUTPATIENT
Start: 2019-08-28

## 2019-09-03 RX ORDER — AMITRIPTYLINE HYDROCHLORIDE 100 MG/1
100 TABLET ORAL NIGHTLY
Qty: 90 TABLET | Refills: 3 | OUTPATIENT
Start: 2019-09-03

## 2019-09-06 RX ORDER — AMITRIPTYLINE HYDROCHLORIDE 100 MG/1
100 TABLET ORAL NIGHTLY
Qty: 90 TABLET | Refills: 4 | Status: SHIPPED | OUTPATIENT
Start: 2019-09-06 | End: 2020-08-21 | Stop reason: SDUPTHER

## 2019-09-26 RX ORDER — PRAVASTATIN SODIUM 40 MG/1
40 TABLET ORAL NIGHTLY
Qty: 90 TABLET | Refills: 0 | Status: SHIPPED | OUTPATIENT
Start: 2019-09-26 | End: 2019-12-24

## 2019-10-11 LAB — MICROALB/CREAT RATIO: 0.29

## 2019-10-21 ENCOUNTER — PATIENT MESSAGE (OUTPATIENT)
Dept: ADMINISTRATIVE | Facility: HOSPITAL | Age: 69
End: 2019-10-21

## 2019-10-29 DIAGNOSIS — E11.9 CONTROLLED TYPE 2 DIABETES MELLITUS WITHOUT COMPLICATION, WITHOUT LONG-TERM CURRENT USE OF INSULIN: ICD-10-CM

## 2019-10-30 RX ORDER — METFORMIN HYDROCHLORIDE 500 MG/1
TABLET, EXTENDED RELEASE ORAL
Qty: 180 TABLET | Refills: 3 | OUTPATIENT
Start: 2019-10-30

## 2019-11-06 ENCOUNTER — PATIENT OUTREACH (OUTPATIENT)
Dept: ADMINISTRATIVE | Facility: HOSPITAL | Age: 69
End: 2019-11-06

## 2019-11-21 ENCOUNTER — PATIENT OUTREACH (OUTPATIENT)
Dept: ADMINISTRATIVE | Facility: HOSPITAL | Age: 69
End: 2019-11-21

## 2019-11-21 ENCOUNTER — PATIENT MESSAGE (OUTPATIENT)
Dept: INTERNAL MEDICINE | Facility: CLINIC | Age: 69
End: 2019-11-21

## 2019-12-02 ENCOUNTER — TELEPHONE (OUTPATIENT)
Dept: ORTHOPEDICS | Facility: CLINIC | Age: 69
End: 2019-12-02

## 2019-12-02 DIAGNOSIS — M25.532 LEFT WRIST PAIN: Primary | ICD-10-CM

## 2019-12-02 DIAGNOSIS — M79.641 PAIN IN BOTH HANDS: ICD-10-CM

## 2019-12-02 DIAGNOSIS — M79.642 PAIN IN BOTH HANDS: ICD-10-CM

## 2019-12-02 NOTE — TELEPHONE ENCOUNTER
Left voicemail for patient stating that we need xrays before her appointment on Wed. 12/4/19 and to be here at 3:15 P.M. If she needs anything else or has any questions to please call back.

## 2019-12-04 ENCOUNTER — OFFICE VISIT (OUTPATIENT)
Dept: ORTHOPEDICS | Facility: CLINIC | Age: 69
End: 2019-12-04
Payer: MEDICARE

## 2019-12-04 ENCOUNTER — HOSPITAL ENCOUNTER (OUTPATIENT)
Dept: RADIOLOGY | Facility: HOSPITAL | Age: 69
Discharge: HOME | End: 2019-12-04
Attending: ORTHOPAEDIC SURGERY
Payer: MEDICARE

## 2019-12-04 ENCOUNTER — PATIENT MESSAGE (OUTPATIENT)
Dept: INTERNAL MEDICINE | Facility: CLINIC | Age: 69
End: 2019-12-04

## 2019-12-04 VITALS
BODY MASS INDEX: 36.32 KG/M2 | DIASTOLIC BLOOD PRESSURE: 69 MMHG | HEIGHT: 63 IN | SYSTOLIC BLOOD PRESSURE: 112 MMHG | WEIGHT: 205 LBS | HEART RATE: 107 BPM

## 2019-12-04 DIAGNOSIS — M25.532 LEFT WRIST PAIN: ICD-10-CM

## 2019-12-04 DIAGNOSIS — M65.4 RADIAL STYLOID TENOSYNOVITIS (DE QUERVAIN): Primary | ICD-10-CM

## 2019-12-04 DIAGNOSIS — M79.642 PAIN IN BOTH HANDS: ICD-10-CM

## 2019-12-04 DIAGNOSIS — M79.641 PAIN IN BOTH HANDS: ICD-10-CM

## 2019-12-04 PROCEDURE — 3074F PR MOST RECENT SYSTOLIC BLOOD PRESSURE < 130 MM HG: ICD-10-PCS | Mod: HCNC,CPTII,S$GLB, | Performed by: ORTHOPAEDIC SURGERY

## 2019-12-04 PROCEDURE — 3078F DIAST BP <80 MM HG: CPT | Mod: HCNC,CPTII,S$GLB, | Performed by: ORTHOPAEDIC SURGERY

## 2019-12-04 PROCEDURE — 20550 TENDON SHEATH: L THUMB MCP: ICD-10-PCS | Mod: FA,HCNC,S$GLB, | Performed by: ORTHOPAEDIC SURGERY

## 2019-12-04 PROCEDURE — 1101F PT FALLS ASSESS-DOCD LE1/YR: CPT | Mod: HCNC,CPTII,S$GLB, | Performed by: ORTHOPAEDIC SURGERY

## 2019-12-04 PROCEDURE — 73110 X-RAY EXAM OF WRIST: CPT | Mod: TC,HCNC,LT

## 2019-12-04 PROCEDURE — 73110 XR WRIST COMPLETE 3 VIEWS LEFT: ICD-10-PCS | Mod: 26,HCNC,LT, | Performed by: RADIOLOGY

## 2019-12-04 PROCEDURE — 73110 X-RAY EXAM OF WRIST: CPT | Mod: 26,HCNC,LT, | Performed by: RADIOLOGY

## 2019-12-04 PROCEDURE — 73130 X-RAY EXAM OF HAND: CPT | Mod: 50,TC,HCNC

## 2019-12-04 PROCEDURE — 99214 OFFICE O/P EST MOD 30 MIN: CPT | Mod: 25,HCNC,S$GLB, | Performed by: ORTHOPAEDIC SURGERY

## 2019-12-04 PROCEDURE — 1159F MED LIST DOCD IN RCRD: CPT | Mod: HCNC,S$GLB,, | Performed by: ORTHOPAEDIC SURGERY

## 2019-12-04 PROCEDURE — 1101F PR PT FALLS ASSESS DOC 0-1 FALLS W/OUT INJ PAST YR: ICD-10-PCS | Mod: HCNC,CPTII,S$GLB, | Performed by: ORTHOPAEDIC SURGERY

## 2019-12-04 PROCEDURE — 99999 PR PBB SHADOW E&M-EST. PATIENT-LVL III: CPT | Mod: PBBFAC,HCNC,, | Performed by: ORTHOPAEDIC SURGERY

## 2019-12-04 PROCEDURE — 99214 PR OFFICE/OUTPT VISIT, EST, LEVL IV, 30-39 MIN: ICD-10-PCS | Mod: 25,HCNC,S$GLB, | Performed by: ORTHOPAEDIC SURGERY

## 2019-12-04 PROCEDURE — 3078F PR MOST RECENT DIASTOLIC BLOOD PRESSURE < 80 MM HG: ICD-10-PCS | Mod: HCNC,CPTII,S$GLB, | Performed by: ORTHOPAEDIC SURGERY

## 2019-12-04 PROCEDURE — 99999 PR PBB SHADOW E&M-EST. PATIENT-LVL III: ICD-10-PCS | Mod: PBBFAC,HCNC,, | Performed by: ORTHOPAEDIC SURGERY

## 2019-12-04 PROCEDURE — 1125F AMNT PAIN NOTED PAIN PRSNT: CPT | Mod: HCNC,S$GLB,, | Performed by: ORTHOPAEDIC SURGERY

## 2019-12-04 PROCEDURE — 1159F PR MEDICATION LIST DOCUMENTED IN MEDICAL RECORD: ICD-10-PCS | Mod: HCNC,S$GLB,, | Performed by: ORTHOPAEDIC SURGERY

## 2019-12-04 PROCEDURE — 20550 NJX 1 TENDON SHEATH/LIGAMENT: CPT | Mod: FA,HCNC,S$GLB, | Performed by: ORTHOPAEDIC SURGERY

## 2019-12-04 PROCEDURE — 3074F SYST BP LT 130 MM HG: CPT | Mod: HCNC,CPTII,S$GLB, | Performed by: ORTHOPAEDIC SURGERY

## 2019-12-04 PROCEDURE — 73130 X-RAY EXAM OF HAND: CPT | Mod: 26,50,HCNC, | Performed by: RADIOLOGY

## 2019-12-04 PROCEDURE — 1125F PR PAIN SEVERITY QUANTIFIED, PAIN PRESENT: ICD-10-PCS | Mod: HCNC,S$GLB,, | Performed by: ORTHOPAEDIC SURGERY

## 2019-12-04 PROCEDURE — 73130 XR HAND COMPLETE 3 VIEWS BILATERAL: ICD-10-PCS | Mod: 26,50,HCNC, | Performed by: RADIOLOGY

## 2019-12-04 RX ORDER — METHYLPREDNISOLONE ACETATE 80 MG/ML
40 INJECTION, SUSPENSION INTRA-ARTICULAR; INTRALESIONAL; INTRAMUSCULAR; SOFT TISSUE
Status: DISCONTINUED | OUTPATIENT
Start: 2019-12-04 | End: 2019-12-04 | Stop reason: HOSPADM

## 2019-12-04 RX ORDER — HYDROCODONE BITARTRATE AND ACETAMINOPHEN 5; 325 MG/1; MG/1
1 TABLET ORAL EVERY 6 HOURS PRN
Qty: 15 TABLET | Refills: 0 | Status: SHIPPED | OUTPATIENT
Start: 2019-12-04 | End: 2020-01-29 | Stop reason: SDUPTHER

## 2019-12-04 RX ADMIN — METHYLPREDNISOLONE ACETATE 40 MG: 80 INJECTION, SUSPENSION INTRA-ARTICULAR; INTRALESIONAL; INTRAMUSCULAR; SOFT TISSUE at 03:12

## 2019-12-04 NOTE — PROCEDURES
Tendon Sheath: L thumb MCP  Date/Time: 12/4/2019 3:40 PM  Performed by: Jaime Oswald MD  Authorized by: Jaime Oswald MD     Consent Done?:  Yes (Verbal)  Timeout: prior to procedure the correct patient, procedure, and site was verified    Timeout: prior to procedure the correct patient, procedure, and site was verified    Location:  Thumb  Site:  L thumb MCP  Prep: patient was prepped and draped in usual sterile fashion    Needle size:  25 G  Medications:  40 mg methylPREDNISolone acetate 80 mg/mL

## 2019-12-04 NOTE — PROGRESS NOTES
Subjective:     Patient ID: Cassandra Campos is a 69 y.o. female.    Chief Complaint: Pain of the Left Wrist    The patient is a 69-year-old female with left de Quervain tendonitis unresponsive to conservative measures.  She has tried splinting with a wrist splint without improvement.  She wishes to try cortisone injection.      Past Medical History:   Diagnosis Date    Arthritis     hands    Borderline glaucoma     Gastritis     upper GI 2017    Hydradenitis     Hyperlipidemia     Hypertension     Insomnia     Migraines 2000    Morbid obesity due to excess calories 2016    Nasal septum perforation     Obesity     Pneumonia     Restrictive airway disease     Sleep apnea     SVT (supraventricular tachycardia) 2013    Type 2 diabetes mellitus     Type II or unspecified type diabetes mellitus without mention of complication, not stated as uncontrolled 2013     Past Surgical History:   Procedure Laterality Date    AXILLARY HIDRADENITIS EXCISION      BONE EXOSTOSIS EXCISION Right 2018    Procedure: EXCISION, EXOSTOSIS;  Surgeon: Jayro Pedraza Sr., MD;  Location: [facility];  Service: Orthopedics;  Laterality: Right;    BREAST BIOPSY Bilateral     BREAST LUMPECTOMY Bilateral 1998    Benign    CARPAL TUNNEL RELEASE      bilateral    CATARACT EXTRACTION Bilateral     OU     SECTION  1979    CHOLECYSTECTOMY  2014    CYST REMOVAL  2015    sebaceous cyst removed from face    gastric sleeve  2017    Dr. Watson    KNEE SURGERY Right     OLECRANON BURSECTOMY Right 2018    Procedure: BURSECTOMY, OLECRANON;  Surgeon: Jayro Pedraza Sr., MD;  Location: [facility] OR;  Service: Orthopedics;  Laterality: Right;    SURGICAL REMOVAL OF BUNION WITH OSTEOTOMY OF METATARSAL BONE Left 5/10/2019    Procedure: BUNIONECTOMY, WITH METATARSAL OSTEOTOMY;  Surgeon: Srinivasan Villanueva DPM;  Location: [facility] OR;  Service: Podiatry;  Laterality: Left;    SURGICAL REMOVAL OF  BUNION WITH OSTEOTOMY OF METATARSAL BONE Right 2019    Procedure: BUNIONECTOMY, WITH METATARSAL OSTEOTOMY;  Surgeon: Srinivasan Villanueva DPM;  Location: [facility];  Service: Podiatry;  Laterality: Right;    TONSILLECTOMY, ADENOIDECTOMY  1980s    TRIGGER FINGER RELEASE Right 2015    Dr. Pedraza     Family History   Problem Relation Age of Onset    Prostate cancer Brother     Diabetes Maternal Aunt      Social History     Socioeconomic History    Marital status:      Spouse name: Not on file    Number of children: 1    Years of education: Not on file    Highest education level: Not on file   Occupational History    Occupation:  aid   Social Needs    Financial resource strain: Not on file    Food insecurity:     Worry: Not on file     Inability: Not on file    Transportation needs:     Medical: Not on file     Non-medical: Not on file   Tobacco Use    Smoking status: Former Smoker     Packs/day: 0.50     Years: 30.00     Pack years: 15.00     Types: Cigarettes     Start date: 1970     Last attempt to quit: 2012     Years since quittin.9    Smokeless tobacco: Never Used   Substance and Sexual Activity    Alcohol use: Yes     Comment: rarely // No alcohol 72 HOURS prior to surgery    Drug use: No    Sexual activity: Not Currently     Partners: Male   Lifestyle    Physical activity:     Days per week: Not on file     Minutes per session: Not on file    Stress: Not on file   Relationships    Social connections:     Talks on phone: Not on file     Gets together: Not on file     Attends Jewish service: Not on file     Active member of club or organization: Not on file     Attends meetings of clubs or organizations: Not on file     Relationship status: Not on file   Other Topics Concern    Not on file   Social History Narrative    Single, part-time teacher. Masters degree biology.      Medication List with Changes/Refills   Current Medications    ACCU-CHEK  FASTCLIX MISC    USE ONE TIME DAILY    ACCU-CHEK SMARTVIEW TEST STRIP STRP    TEST ONE TIME DAILY    ALBUTEROL (PROVENTIL/VENTOLIN HFA) 90 MCG/ACTUATION INHALER    Inhale 2 puffs into the lungs every 4 to 6 hours as needed for Wheezing or Shortness of Breath. Hold for refills    AMITRIPTYLINE (ELAVIL) 100 MG TABLET    Take 1 tablet (100 mg total) by mouth nightly.    BLOOD-GLUCOSE METER KIT    Use as instructed    CICLOPIROX (PENLAC) 8 % SOLN    Apply to affected toenails at night time DAILY. On 7th day, file nails down, clean all nails with alcohol and restart application process.    ESZOPICLONE (LUNESTA) 3 MG TAB    Take 1 tablet (3 mg total) by mouth nightly as needed (sleep).    METFORMIN (GLUCOPHAGE-XR) 500 MG 24 HR TABLET    TAKE 2 TABLETS ONE TIME DAILY    METOPROLOL SUCCINATE (TOPROL-XL) 50 MG 24 HR TABLET    Take 1 tablet (50 mg total) by mouth once daily.    OMEPRAZOLE (PRILOSEC) 20 MG CAPSULE    Take 1 capsule (20 mg total) by mouth once daily.    PRAVASTATIN (PRAVACHOL) 40 MG TABLET    Take 1 tablet (40 mg total) by mouth nightly.     Review of patient's allergies indicates:  No Known Allergies  Review of Systems   Constitution: Negative for malaise/fatigue.   HENT: Negative for hearing loss.    Eyes: Negative for double vision and visual disturbance.   Cardiovascular: Negative for chest pain.   Respiratory: Negative for shortness of breath.    Endocrine: Negative for cold intolerance.   Hematologic/Lymphatic: Does not bruise/bleed easily.   Skin: Negative for poor wound healing and suspicious lesions.   Musculoskeletal: Positive for arthritis. Negative for gout, joint pain and joint swelling.   Gastrointestinal: Positive for heartburn. Negative for nausea and vomiting.   Genitourinary: Negative for dysuria.   Neurological: Negative for numbness, paresthesias and sensory change.   Psychiatric/Behavioral: Negative for depression, memory loss and substance abuse. The patient has insomnia. The patient is  not nervous/anxious.    Allergic/Immunologic: Negative for persistent infections.       Objective:   Body mass index is 36.31 kg/m².  Vitals:    12/04/19 1505   BP: 112/69   Pulse: 107                General    Constitutional: She is oriented to person, place, and time. She appears well-developed and well-nourished. No distress.   HENT:   Head: Normocephalic.   Eyes: EOM are normal.   Neck: Normal range of motion.   Pulmonary/Chest: Effort normal.   Neurological: She is oriented to person, place, and time.   Psychiatric: She has a normal mood and affect.         Left Hand/Wrist Exam     Inspection   Scars: Wrist - absent Hand -  absent  Effusion: Wrist - present Hand -  absent    Tenderness   The patient is tender to palpation of the radial area.     Tests   Finkelstein's test: positive      Other     Sensory Exam  Median Distribution: normal  Ulnar Distribution: normal  Radial Distribution: normal    Comments:  The patient has tenderness 1st dorsal extensor tendon compartment and a positive Finkelstein test.  There are no motor or sensory deficits.          Vascular Exam       Capillary Refill  Left Hand: normal capillary refill      Relevant imaging results reviewed and interpreted by me, discussed with the patient and / or family today radiographs left wrist were normal.  Assessment:     Encounter Diagnosis   Name Primary?    Radial styloid tenosynovitis (de quervain) Yes        Plan:     The patient was injected in the 1st dorsal extensor tendon compartment with 0.5 cc of 2% plain lidocaine 0.5 cc of Depo-Medrol.  She was given a thumb spica splint.  She will return in 3 weeks if not improved.                Disclaimer: This note was prepared using a voice recognition system and is likely to have sound alike errors within the text.

## 2019-12-05 NOTE — TELEPHONE ENCOUNTER
Patient asking if urine protein -check form that was faxed been received and updated. Please advise.

## 2019-12-10 ENCOUNTER — PATIENT OUTREACH (OUTPATIENT)
Dept: ADMINISTRATIVE | Facility: HOSPITAL | Age: 69
End: 2019-12-10

## 2019-12-24 RX ORDER — PRAVASTATIN SODIUM 40 MG/1
TABLET ORAL
Qty: 90 TABLET | Refills: 4 | Status: SHIPPED | OUTPATIENT
Start: 2019-12-24 | End: 2021-02-03

## 2020-01-06 ENCOUNTER — PATIENT MESSAGE (OUTPATIENT)
Dept: ORTHOPEDICS | Facility: CLINIC | Age: 70
End: 2020-01-06

## 2020-01-06 ENCOUNTER — PATIENT MESSAGE (OUTPATIENT)
Dept: INTERNAL MEDICINE | Facility: CLINIC | Age: 70
End: 2020-01-06

## 2020-01-06 DIAGNOSIS — Z12.31 ENCOUNTER FOR SCREENING MAMMOGRAM FOR BREAST CANCER: Primary | ICD-10-CM

## 2020-01-07 ENCOUNTER — TELEPHONE (OUTPATIENT)
Dept: ORTHOPEDICS | Facility: CLINIC | Age: 70
End: 2020-01-07

## 2020-01-07 ENCOUNTER — PATIENT MESSAGE (OUTPATIENT)
Dept: ORTHOPEDICS | Facility: CLINIC | Age: 70
End: 2020-01-07

## 2020-01-07 NOTE — TELEPHONE ENCOUNTER
Left voicemail for patient regarding previous message concerning her hand not feeling better after injection. I spoke to Dr. Oswald an he stated that Surgery would be the way to fix her concerns. I also left that information for the patient on her voicemail and that if she has any other concerns or questions to please give us a call back.

## 2020-01-14 ENCOUNTER — OFFICE VISIT (OUTPATIENT)
Dept: PODIATRY | Facility: CLINIC | Age: 70
End: 2020-01-14
Payer: MEDICARE

## 2020-01-14 ENCOUNTER — OFFICE VISIT (OUTPATIENT)
Dept: ORTHOPEDICS | Facility: CLINIC | Age: 70
End: 2020-01-14
Payer: MEDICARE

## 2020-01-14 ENCOUNTER — HOSPITAL ENCOUNTER (OUTPATIENT)
Dept: RADIOLOGY | Facility: HOSPITAL | Age: 70
Discharge: HOME | End: 2020-01-14
Attending: FAMILY MEDICINE
Payer: MEDICARE

## 2020-01-14 ENCOUNTER — CLINICAL SUPPORT (OUTPATIENT)
Dept: CARDIOLOGY | Facility: CLINIC | Age: 70
End: 2020-01-14
Payer: MEDICARE

## 2020-01-14 ENCOUNTER — HOSPITAL ENCOUNTER (OUTPATIENT)
Dept: RADIOLOGY | Facility: HOSPITAL | Age: 70
Discharge: HOME | End: 2020-01-14
Attending: ORTHOPAEDIC SURGERY
Payer: MEDICARE

## 2020-01-14 VITALS
SYSTOLIC BLOOD PRESSURE: 120 MMHG | BODY MASS INDEX: 36.64 KG/M2 | HEART RATE: 92 BPM | DIASTOLIC BLOOD PRESSURE: 84 MMHG | HEIGHT: 63 IN | WEIGHT: 206 LBS | WEIGHT: 206.81 LBS | DIASTOLIC BLOOD PRESSURE: 84 MMHG | SYSTOLIC BLOOD PRESSURE: 134 MMHG | HEART RATE: 90 BPM | BODY MASS INDEX: 36.5 KG/M2 | HEIGHT: 63 IN

## 2020-01-14 VITALS — HEIGHT: 63 IN | WEIGHT: 206 LBS | BODY MASS INDEX: 36.5 KG/M2

## 2020-01-14 DIAGNOSIS — Z01.818 PREOP TESTING: ICD-10-CM

## 2020-01-14 DIAGNOSIS — E11.9 COMPREHENSIVE DIABETIC FOOT EXAMINATION, TYPE 2 DM, ENCOUNTER FOR: Primary | ICD-10-CM

## 2020-01-14 DIAGNOSIS — Z12.31 ENCOUNTER FOR SCREENING MAMMOGRAM FOR BREAST CANCER: ICD-10-CM

## 2020-01-14 DIAGNOSIS — E11.8 TYPE 2 DIABETES MELLITUS WITH COMPLICATION: ICD-10-CM

## 2020-01-14 DIAGNOSIS — E66.01 SEVERE OBESITY (BMI 35.0-39.9) WITH COMORBIDITY: ICD-10-CM

## 2020-01-14 DIAGNOSIS — B35.1 ONYCHOMYCOSIS: ICD-10-CM

## 2020-01-14 DIAGNOSIS — M65.4 RADIAL STYLOID TENOSYNOVITIS (DE QUERVAIN): Primary | ICD-10-CM

## 2020-01-14 PROCEDURE — 93005 EKG 12-LEAD: ICD-10-PCS | Mod: HCNC,S$GLB,, | Performed by: ORTHOPAEDIC SURGERY

## 2020-01-14 PROCEDURE — 77063 BREAST TOMOSYNTHESIS BI: CPT | Mod: 26,HCNC,, | Performed by: RADIOLOGY

## 2020-01-14 PROCEDURE — 93010 ELECTROCARDIOGRAM REPORT: CPT | Mod: HCNC,S$GLB,, | Performed by: INTERNAL MEDICINE

## 2020-01-14 PROCEDURE — 93010 EKG 12-LEAD: ICD-10-PCS | Mod: HCNC,S$GLB,, | Performed by: INTERNAL MEDICINE

## 2020-01-14 PROCEDURE — 99999 PR PBB SHADOW E&M-EST. PATIENT-LVL III: ICD-10-PCS | Mod: PBBFAC,HCNC,, | Performed by: PODIATRIST

## 2020-01-14 PROCEDURE — 93005 ELECTROCARDIOGRAM TRACING: CPT | Mod: HCNC,S$GLB,, | Performed by: ORTHOPAEDIC SURGERY

## 2020-01-14 PROCEDURE — 99213 OFFICE O/P EST LOW 20 MIN: CPT | Mod: HCNC,S$GLB,, | Performed by: ORTHOPAEDIC SURGERY

## 2020-01-14 PROCEDURE — 3079F DIAST BP 80-89 MM HG: CPT | Mod: HCNC,CPTII,S$GLB, | Performed by: ORTHOPAEDIC SURGERY

## 2020-01-14 PROCEDURE — 3079F PR MOST RECENT DIASTOLIC BLOOD PRESSURE 80-89 MM HG: ICD-10-PCS | Mod: HCNC,CPTII,S$GLB, | Performed by: ORTHOPAEDIC SURGERY

## 2020-01-14 PROCEDURE — 71046 X-RAY EXAM CHEST 2 VIEWS: CPT | Mod: 26,HCNC,, | Performed by: RADIOLOGY

## 2020-01-14 PROCEDURE — 71046 XR CHEST PA AND LATERAL PRE-OP: ICD-10-PCS | Mod: 26,HCNC,, | Performed by: RADIOLOGY

## 2020-01-14 PROCEDURE — 1101F PR PT FALLS ASSESS DOC 0-1 FALLS W/OUT INJ PAST YR: ICD-10-PCS | Mod: HCNC,CPTII,S$GLB, | Performed by: PODIATRIST

## 2020-01-14 PROCEDURE — 3079F DIAST BP 80-89 MM HG: CPT | Mod: HCNC,CPTII,S$GLB, | Performed by: PODIATRIST

## 2020-01-14 PROCEDURE — 3075F PR MOST RECENT SYSTOLIC BLOOD PRESS GE 130-139MM HG: ICD-10-PCS | Mod: HCNC,CPTII,S$GLB, | Performed by: ORTHOPAEDIC SURGERY

## 2020-01-14 PROCEDURE — 3079F PR MOST RECENT DIASTOLIC BLOOD PRESSURE 80-89 MM HG: ICD-10-PCS | Mod: HCNC,CPTII,S$GLB, | Performed by: PODIATRIST

## 2020-01-14 PROCEDURE — 3044F HG A1C LEVEL LT 7.0%: CPT | Mod: HCNC,CPTII,S$GLB, | Performed by: PODIATRIST

## 2020-01-14 PROCEDURE — 77067 MAMMO DIGITAL SCREENING BILAT WITH TOMOSYNTHESIS_CAD: ICD-10-PCS | Mod: 26,HCNC,, | Performed by: RADIOLOGY

## 2020-01-14 PROCEDURE — 71046 X-RAY EXAM CHEST 2 VIEWS: CPT | Mod: TC,HCNC

## 2020-01-14 PROCEDURE — 1126F AMNT PAIN NOTED NONE PRSNT: CPT | Mod: HCNC,S$GLB,, | Performed by: PODIATRIST

## 2020-01-14 PROCEDURE — 99214 PR OFFICE/OUTPT VISIT, EST, LEVL IV, 30-39 MIN: ICD-10-PCS | Mod: HCNC,S$GLB,, | Performed by: PODIATRIST

## 2020-01-14 PROCEDURE — 99499 UNLISTED E&M SERVICE: CPT | Mod: HCNC,S$GLB,, | Performed by: PODIATRIST

## 2020-01-14 PROCEDURE — 1159F MED LIST DOCD IN RCRD: CPT | Mod: HCNC,S$GLB,, | Performed by: PODIATRIST

## 2020-01-14 PROCEDURE — 1159F MED LIST DOCD IN RCRD: CPT | Mod: HCNC,S$GLB,, | Performed by: ORTHOPAEDIC SURGERY

## 2020-01-14 PROCEDURE — 1159F PR MEDICATION LIST DOCUMENTED IN MEDICAL RECORD: ICD-10-PCS | Mod: HCNC,S$GLB,, | Performed by: PODIATRIST

## 2020-01-14 PROCEDURE — 1101F PT FALLS ASSESS-DOCD LE1/YR: CPT | Mod: HCNC,CPTII,S$GLB, | Performed by: ORTHOPAEDIC SURGERY

## 2020-01-14 PROCEDURE — 1101F PT FALLS ASSESS-DOCD LE1/YR: CPT | Mod: HCNC,CPTII,S$GLB, | Performed by: PODIATRIST

## 2020-01-14 PROCEDURE — 3044F PR MOST RECENT HEMOGLOBIN A1C LEVEL <7.0%: ICD-10-PCS | Mod: HCNC,CPTII,S$GLB, | Performed by: PODIATRIST

## 2020-01-14 PROCEDURE — 3074F PR MOST RECENT SYSTOLIC BLOOD PRESSURE < 130 MM HG: ICD-10-PCS | Mod: HCNC,CPTII,S$GLB, | Performed by: PODIATRIST

## 2020-01-14 PROCEDURE — 1125F AMNT PAIN NOTED PAIN PRSNT: CPT | Mod: HCNC,S$GLB,, | Performed by: ORTHOPAEDIC SURGERY

## 2020-01-14 PROCEDURE — 3074F SYST BP LT 130 MM HG: CPT | Mod: HCNC,CPTII,S$GLB, | Performed by: PODIATRIST

## 2020-01-14 PROCEDURE — 99499 RISK ADDL DX/OHS AUDIT: ICD-10-PCS | Mod: HCNC,S$GLB,, | Performed by: PODIATRIST

## 2020-01-14 PROCEDURE — 1126F PR PAIN SEVERITY QUANTIFIED, NO PAIN PRESENT: ICD-10-PCS | Mod: HCNC,S$GLB,, | Performed by: PODIATRIST

## 2020-01-14 PROCEDURE — 1159F PR MEDICATION LIST DOCUMENTED IN MEDICAL RECORD: ICD-10-PCS | Mod: HCNC,S$GLB,, | Performed by: ORTHOPAEDIC SURGERY

## 2020-01-14 PROCEDURE — 1101F PR PT FALLS ASSESS DOC 0-1 FALLS W/OUT INJ PAST YR: ICD-10-PCS | Mod: HCNC,CPTII,S$GLB, | Performed by: ORTHOPAEDIC SURGERY

## 2020-01-14 PROCEDURE — 1125F PR PAIN SEVERITY QUANTIFIED, PAIN PRESENT: ICD-10-PCS | Mod: HCNC,S$GLB,, | Performed by: ORTHOPAEDIC SURGERY

## 2020-01-14 PROCEDURE — 99999 PR PBB SHADOW E&M-EST. PATIENT-LVL III: ICD-10-PCS | Mod: PBBFAC,HCNC,, | Performed by: ORTHOPAEDIC SURGERY

## 2020-01-14 PROCEDURE — 99999 PR PBB SHADOW E&M-EST. PATIENT-LVL III: CPT | Mod: PBBFAC,HCNC,, | Performed by: ORTHOPAEDIC SURGERY

## 2020-01-14 PROCEDURE — 3075F SYST BP GE 130 - 139MM HG: CPT | Mod: HCNC,CPTII,S$GLB, | Performed by: ORTHOPAEDIC SURGERY

## 2020-01-14 PROCEDURE — 77063 MAMMO DIGITAL SCREENING BILAT WITH TOMOSYNTHESIS_CAD: ICD-10-PCS | Mod: 26,HCNC,, | Performed by: RADIOLOGY

## 2020-01-14 PROCEDURE — 99999 PR PBB SHADOW E&M-EST. PATIENT-LVL III: CPT | Mod: PBBFAC,HCNC,, | Performed by: PODIATRIST

## 2020-01-14 PROCEDURE — 77067 SCR MAMMO BI INCL CAD: CPT | Mod: TC,HCNC

## 2020-01-14 PROCEDURE — 77067 SCR MAMMO BI INCL CAD: CPT | Mod: 26,HCNC,, | Performed by: RADIOLOGY

## 2020-01-14 PROCEDURE — 99213 PR OFFICE/OUTPT VISIT, EST, LEVL III, 20-29 MIN: ICD-10-PCS | Mod: HCNC,S$GLB,, | Performed by: ORTHOPAEDIC SURGERY

## 2020-01-14 PROCEDURE — 99214 OFFICE O/P EST MOD 30 MIN: CPT | Mod: HCNC,S$GLB,, | Performed by: PODIATRIST

## 2020-01-14 RX ORDER — KETOCONAZOLE 20 MG/G
CREAM TOPICAL 2 TIMES DAILY
Qty: 30 G | Refills: 2 | Status: SHIPPED | OUTPATIENT
Start: 2020-01-14 | End: 2020-04-07

## 2020-01-14 NOTE — H&P (VIEW-ONLY)
Subjective:     Patient ID: Cassandra Campos is a 69 y.o. female.    Chief Complaint: Pain of the Left Wrist    The patient is a 69-year-old female with left de Quervain tendonitis.  She has tried splinting and injection without improvement.  She wishes to try a surgical release.      Past Medical History:   Diagnosis Date    Arthritis     hands    Borderline glaucoma     Gastritis     upper GI 2017    Hydradenitis     Hyperlipidemia     Hypertension     Insomnia     Migraines 2000    Morbid obesity due to excess calories 2016    Nasal septum perforation     Obesity     Pneumonia     Restrictive airway disease     Sleep apnea     SVT (supraventricular tachycardia) 2013    Type 2 diabetes mellitus     Type II or unspecified type diabetes mellitus without mention of complication, not stated as uncontrolled 2013     Past Surgical History:   Procedure Laterality Date    AXILLARY HIDRADENITIS EXCISION      BONE EXOSTOSIS EXCISION Right 2018    Procedure: EXCISION, EXOSTOSIS;  Surgeon: Jayro Pedraza Sr., MD;  Location: [facility] OR;  Service: Orthopedics;  Laterality: Right;    BREAST BIOPSY Bilateral     BREAST LUMPECTOMY Bilateral 1998    Benign    CARPAL TUNNEL RELEASE      bilateral    CATARACT EXTRACTION Bilateral     OU     SECTION  1979    CHOLECYSTECTOMY  2014    CYST REMOVAL  2015    sebaceous cyst removed from face    gastric sleeve  2017    Dr. Watson    KNEE SURGERY Right     OLECRANON BURSECTOMY Right 2018    Procedure: BURSECTOMY, OLECRANON;  Surgeon: Jayro Pedraza Sr., MD;  Location: [facility] OR;  Service: Orthopedics;  Laterality: Right;    SURGICAL REMOVAL OF BUNION WITH OSTEOTOMY OF METATARSAL BONE Left 5/10/2019    Procedure: BUNIONECTOMY, WITH METATARSAL OSTEOTOMY;  Surgeon: Srinivasan Villanueva DPM;  Location: [facility] OR;  Service: Podiatry;  Laterality: Left;    SURGICAL REMOVAL OF BUNION WITH OSTEOTOMY OF METATARSAL BONE Right  2019    Procedure: BUNIONECTOMY, WITH METATARSAL OSTEOTOMY;  Surgeon: Srinivasan Villanueva DPM;  Location: [facility];  Service: Podiatry;  Laterality: Right;    TONSILLECTOMY, ADENOIDECTOMY  1980s    TRIGGER FINGER RELEASE Right 2015    Dr. Pedraza     Family History   Problem Relation Age of Onset    Prostate cancer Brother     Diabetes Maternal Aunt      Social History     Socioeconomic History    Marital status:      Spouse name: Not on file    Number of children: 1    Years of education: Not on file    Highest education level: Not on file   Occupational History    Occupation:  aid   Social Needs    Financial resource strain: Not on file    Food insecurity:     Worry: Not on file     Inability: Not on file    Transportation needs:     Medical: Not on file     Non-medical: Not on file   Tobacco Use    Smoking status: Former Smoker     Packs/day: 0.50     Years: 30.00     Pack years: 15.00     Types: Cigarettes     Start date: 1970     Last attempt to quit: 2012     Years since quittin.0    Smokeless tobacco: Never Used   Substance and Sexual Activity    Alcohol use: Yes     Comment: rarely // No alcohol 72 HOURS prior to surgery    Drug use: No    Sexual activity: Not Currently     Partners: Male   Lifestyle    Physical activity:     Days per week: Not on file     Minutes per session: Not on file    Stress: Not on file   Relationships    Social connections:     Talks on phone: Not on file     Gets together: Not on file     Attends Latter-day service: Not on file     Active member of club or organization: Not on file     Attends meetings of clubs or organizations: Not on file     Relationship status: Not on file   Other Topics Concern    Not on file   Social History Narrative    Single, part-time teacher. Masters degree biology.      Medication List with Changes/Refills   New Medications    KETOCONAZOLE (NIZORAL) 2 % CREAM    Apply topically 2 (two)  times daily.   Current Medications    ACCU-CHEK FASTCLIX MISC    USE ONE TIME DAILY    ACCU-CHEK SMARTVIEW TEST STRIP STRP    TEST ONE TIME DAILY    ALBUTEROL (PROVENTIL/VENTOLIN HFA) 90 MCG/ACTUATION INHALER    Inhale 2 puffs into the lungs every 4 to 6 hours as needed for Wheezing or Shortness of Breath. Hold for refills    AMITRIPTYLINE (ELAVIL) 100 MG TABLET    Take 1 tablet (100 mg total) by mouth nightly.    BLOOD-GLUCOSE METER KIT    Use as instructed    CICLOPIROX (PENLAC) 8 % SOLN    Apply to affected toenails at night time DAILY. On 7th day, file nails down, clean all nails with alcohol and restart application process.    ESZOPICLONE (LUNESTA) 3 MG TAB    Take 1 tablet (3 mg total) by mouth nightly as needed (sleep).    HYDROCODONE-ACETAMINOPHEN (NORCO) 5-325 MG PER TABLET    Take 1 tablet by mouth every 6 (six) hours as needed for Pain.    METFORMIN (GLUCOPHAGE-XR) 500 MG 24 HR TABLET    TAKE 2 TABLETS ONE TIME DAILY    METOPROLOL SUCCINATE (TOPROL-XL) 50 MG 24 HR TABLET    Take 1 tablet (50 mg total) by mouth once daily.    OMEPRAZOLE (PRILOSEC) 20 MG CAPSULE    Take 1 capsule (20 mg total) by mouth once daily.    PRAVASTATIN (PRAVACHOL) 40 MG TABLET    TAKE 1 TABLET BY MOUTH EVERY DAY AT NIGHT     Review of patient's allergies indicates:  No Known Allergies  Review of Systems   Constitution: Negative for malaise/fatigue.   HENT: Negative for hearing loss.    Eyes: Negative for double vision and visual disturbance.   Cardiovascular: Negative for chest pain.   Respiratory: Negative for shortness of breath.    Endocrine: Negative for cold intolerance.   Hematologic/Lymphatic: Does not bruise/bleed easily.   Skin: Negative for poor wound healing and suspicious lesions.   Musculoskeletal: Positive for arthritis, joint pain and joint swelling. Negative for gout.   Gastrointestinal: Positive for heartburn. Negative for nausea and vomiting.   Genitourinary: Negative for dysuria.   Neurological: Negative for  numbness, paresthesias and sensory change.   Psychiatric/Behavioral: Negative for depression, memory loss and substance abuse. The patient has insomnia. The patient is not nervous/anxious.    Allergic/Immunologic: Negative for persistent infections.       Objective:   Body mass index is 36.49 kg/m².  Vitals:    01/14/20 1021   BP: 134/84   Pulse: 90                General    Constitutional: She is oriented to person, place, and time. She appears well-developed and well-nourished. No distress.   HENT:   Head: Normocephalic.   Mouth/Throat: Oropharynx is clear and moist.   Eyes: EOM are normal.   Neck: Normal range of motion.   Cardiovascular: Normal rate.    Pulmonary/Chest: Effort normal.   Abdominal: Soft.   Neurological: She is alert and oriented to person, place, and time. No cranial nerve deficit.   Psychiatric: She has a normal mood and affect.         Left Hand/Wrist Exam     Inspection   Scars: Wrist - absent   Effusion: Wrist - absent     Pain   Wrist - The patient exhibits pain of the extensory musculature.    Tests   Finkelstein's test: positive      Other     Sensory Exam  Median Distribution: normal  Ulnar Distribution: normal  Radial Distribution: normal    Comments:  The patient has a tender 1st dorsal extensor tendon compartment left thumb.  There is no sign of infection.  There is a positive Finkelstein test.  There are no motor or sensory deficits.          Vascular Exam       Capillary Refill  Left Hand: normal capillary refill       radiographs left wrist were normal  Assessment:     Encounter Diagnosis   Name Primary?    Radial styloid tenosynovitis (de quervain) Yes    left wrist    Plan:     The patient wished to have a left de Quervain release.  She was counseled regarding that surgery. Risk complications and alternatives were discussed including the risk of infection, anesthetic risk, injury to nerves and vessels, loss of motion, and possible need for additional surgeries were discussed. She  seems to understand and agree that surgery. All questions were answered.              Disclaimer: This note was prepared using a voice recognition system and is likely to have sound alike errors within the text.

## 2020-01-14 NOTE — PROGRESS NOTES
Subjective:       Patient ID: Cassandra Campos is a 69 y.o. female.    Chief Complaint: Diabetic Foot Exam (pt rates pain 0/10,slippers ,diabetic pt, PCP Dr. Maldonado.)      HPI: Cassandra Campos presents to the office today, under referral by their Primary Care Provider, Denia Maldonado MD, for her annual diabetic foot assessment and risk evalution Patient is a DMII. Patient states no complications due to the disease state. This patient last saw his/her primary care provider on 1/6/2020.     Hemoglobin A1C   Date Value Ref Range Status   08/22/2019 6.2 (H) 4.0 - 5.6 % Final     Comment:     ADA Screening Guidelines:  5.7-6.4%  Consistent with prediabetes  >or=6.5%  Consistent with diabetes  High levels of fetal hemoglobin interfere with the HbA1C  assay. Heterozygous hemoglobin variants (HbS, HgC, etc)do  not significantly interfere with this assay.   However, presence of multiple variants may affect accuracy.     06/18/2019 6.2 (H) 4.0 - 5.6 % Final     Comment:     ADA Screening Guidelines:  5.7-6.4%  Consistent with prediabetes  >or=6.5%  Consistent with diabetes  High levels of fetal hemoglobin interfere with the HbA1C  assay. Heterozygous hemoglobin variants (HbS, HgC, etc)do  not significantly interfere with this assay.   However, presence of multiple variants may affect accuracy.     02/19/2019 6.1 (H) 4.0 - 5.6 % Final     Comment:     ADA Screening Guidelines:  5.7-6.4%  Consistent with prediabetes  >or=6.5%  Consistent with diabetes  High levels of fetal hemoglobin interfere with the HbA1C  assay. Heterozygous hemoglobin variants (HbS, HgC, etc)do  not significantly interfere with this assay.   However, presence of multiple variants may affect accuracy.     .    Review of patient's allergies indicates:  No Known Allergies    Past Medical History:   Diagnosis Date    Arthritis     hands    Borderline glaucoma     Gastritis     upper GI 2/2017    Hydradenitis     Hyperlipidemia     Hypertension      Insomnia     Migraines 2000    Morbid obesity due to excess calories 2016    Nasal septum perforation     Obesity     Pneumonia     Restrictive airway disease     Sleep apnea     SVT (supraventricular tachycardia) 2013    Type 2 diabetes mellitus     Type II or unspecified type diabetes mellitus without mention of complication, not stated as uncontrolled 2013       Family History   Problem Relation Age of Onset    Prostate cancer Brother     Diabetes Maternal Aunt        Social History     Socioeconomic History    Marital status:      Spouse name: Not on file    Number of children: 1    Years of education: Not on file    Highest education level: Not on file   Occupational History    Occupation:  aid   Social Needs    Financial resource strain: Not on file    Food insecurity:     Worry: Not on file     Inability: Not on file    Transportation needs:     Medical: Not on file     Non-medical: Not on file   Tobacco Use    Smoking status: Former Smoker     Packs/day: 0.50     Years: 30.00     Pack years: 15.00     Types: Cigarettes     Start date: 1970     Last attempt to quit: 2012     Years since quittin.0    Smokeless tobacco: Never Used   Substance and Sexual Activity    Alcohol use: Yes     Comment: rarely // No alcohol 72 HOURS prior to surgery    Drug use: No    Sexual activity: Not Currently     Partners: Male   Lifestyle    Physical activity:     Days per week: Not on file     Minutes per session: Not on file    Stress: Not on file   Relationships    Social connections:     Talks on phone: Not on file     Gets together: Not on file     Attends Taoism service: Not on file     Active member of club or organization: Not on file     Attends meetings of clubs or organizations: Not on file     Relationship status: Not on file   Other Topics Concern    Not on file   Social History Narrative    Single, part-time teacher.  Masters degree biology.        Past Surgical History:   Procedure Laterality Date    AXILLARY HIDRADENITIS EXCISION      BONE EXOSTOSIS EXCISION Right 2018    Procedure: EXCISION, EXOSTOSIS;  Surgeon: Jayro Pedraza Sr., MD;  Location: [facility] OR;  Service: Orthopedics;  Laterality: Right;    BREAST BIOPSY Bilateral     BREAST LUMPECTOMY Bilateral 1998    Benign    CARPAL TUNNEL RELEASE      bilateral    CATARACT EXTRACTION Bilateral     OU     SECTION  1979    CHOLECYSTECTOMY  2014    CYST REMOVAL  2015    sebaceous cyst removed from face    gastric sleeve  2017    Dr. Watson    KNEE SURGERY Right     OLECRANON BURSECTOMY Right 2018    Procedure: BURSECTOMY, OLECRANON;  Surgeon: Jayro Pedraza Sr., MD;  Location: [facility] OR;  Service: Orthopedics;  Laterality: Right;    SURGICAL REMOVAL OF BUNION WITH OSTEOTOMY OF METATARSAL BONE Left 5/10/2019    Procedure: BUNIONECTOMY, WITH METATARSAL OSTEOTOMY;  Surgeon: Srinivasan Villanueva DPM;  Location: [facility] OR;  Service: Podiatry;  Laterality: Left;    SURGICAL REMOVAL OF BUNION WITH OSTEOTOMY OF METATARSAL BONE Right 2019    Procedure: BUNIONECTOMY, WITH METATARSAL OSTEOTOMY;  Surgeon: Srinivasan Villanueva DPM;  Location: [facility] OR;  Service: Podiatry;  Laterality: Right;    TONSILLECTOMY, ADENOIDECTOMY  1980s    TRIGGER FINGER RELEASE Right 2015    Dr. Pedraza       Review of Systems   Constitutional: Negative for chills, fatigue and fever.   HENT: Negative for hearing loss.    Eyes: Negative for photophobia and visual disturbance.   Respiratory: Negative for cough, chest tightness, shortness of breath and wheezing.    Cardiovascular: Negative for chest pain and palpitations.   Gastrointestinal: Negative for constipation, diarrhea, nausea and vomiting.   Endocrine: Negative for cold intolerance and heat intolerance.   Genitourinary: Negative for flank pain.   Musculoskeletal: Negative for gait problem, neck pain and neck  "stiffness.   Skin: Negative for wound.   Neurological: Negative for light-headedness and headaches.   Psychiatric/Behavioral: Negative for sleep disturbance.         Objective:   /84 (BP Location: Right arm, Patient Position: Sitting, BP Method: Medium (Automatic))   Pulse 92   Ht 5' 3" (1.6 m)   Wt 93.8 kg (206 lb 12.7 oz)   BMI 36.63 kg/m²     Physical Exam  LOWER EXTREMITY PHYSICAL EXAMINATION    ORTHOPEDIC: Manual Muscle Testing is 5/5 in all planes on the left and right, without pains, with and without resistance. No pains to palpation of the medial or lateral ankle ligaments. No discomfort to palpation of the posterior tibial tendon, peroneal tendon, Achilles tendon or the anterior ankle tendons.  Rectus foot type is noted. No pain upon range of motion of the midfoot or hindfoot joints. No crepitus upon range of motion and midfoot or hindfoot joints. No ankle pathology is noted. Gait pattern is non-antalgic.    VASCULAR: Warm to warm, proximal to distal. Capillary refill time is within normal limits and less  than 3 seconds. Hair growth is sparse on the left and right dorsal foot and at the digits. The left dorsalis pedis pulse is 1/4 and on the right is 1/4, and the left posterior tibial pulse is 1/4 and is 1/4 on the right.     NEUROLOGY: Proprioception is intact. Sensation to light touch is intact. Protective sensation is intact via 5.07 [place] monofilament. Straight leg raise is negative. Negative Tinel's Sign and negative Valleix Sign. No neurological sensations with compression of the area of Martinez's Nerve in the area of the Abductor Hallucis muscle belly. Upon palpation of the interspaces, there are no neurological sensations stated that radiate proximal or distal. Upon compression of the metatarsal heads from medial to lateral, no neurological sensations or symptoms are stated. Deep tendon reflexes to the lower extremity are WNL.    DERMATOLOGY: Skin is supple, moist, dry and " intact. There are nail changes which are consistent with onychomycosis on the left and right foot. On the left and the right foot, the great toe nails are thickened, are dystrophic, with yellow discoloration, and with malodorous subungual debris. These thickened and dystrophic nails are painful to touch and palpation. The remaining nail plates are elongated, and are not suggestive of onychomycosis.     Assessment:     1. Comprehensive diabetic foot examination, type 2 DM, encounter for    2. Type 2 diabetes mellitus with complication    3. Severe obesity (BMI 35.0-39.9) with comorbidity    4. Onychomycosis        Plan:     Comprehensive diabetic foot examination, type 2 DM, encounter for  Type 2 diabetes mellitus with complication  I counseled the patient on his/her Diabetic Mellitus regarding today's clinical examination and objection findings. We did also discuss recent medication changes, pertinent labs and imaging evaluations and other medical consultation notes and progress notes. Greater than 50% of this visit was spent on counseling and coordination of care. Greater than 20 minutes of this appt. was spent on education about the diabetic foot, in relation to PVD and/or neuropathy, and the prevention of limb loss.     Shoe gear is inspected and wear and proper fit/type. Patient is reminded of the importance of good nutrition and blood sugar control to help prevent podiatric complications of diabetes. Patient instructed on proper foot hygeine. We discussed wearing proper shoe gear, daily foot inspections, never walking without protective shoe gear, never putting sharp instruments to feet.  Patient  will continue to monitor the areas daily, inspect feet, wear protective shoe gear when ambulatory, moisturizer to maintain skin integrity.     Patient's DMI/DMII is managed by Primary Care Provider and/or Endocrinology Advanced Practice Provider. As per the most recent glycohemoglobin level, this patient is at  goal.    Severe obesity (BMI 35.0-39.9) with comorbidity  Patient is counseled and reminded concerning the importance of good nutrition and healthy eating habits, which may include blood sugar control, to prevent and/or help podiatric foot and ankle complications.    Onychomycosis  -     ketoconazole (NIZORAL) 2 % cream; Apply topically 2 (two) times daily.  Dispense: 30 g; Refill: 2    Discussed the various treatment options concerning onychomycosis, these being over-the-counter topicals (efficacy is low in regards to cure), prescription strength topicals (better efficacy as compared to OTC variants, but only slightly so, and potentially costly), laser therapy (which is not covered by insurance companies), oral medications (patient is advised that there is a potential, though less than 5%, for the potential of adverse health and side effects; namely liver pathology) and doing nothing (frequent debridements) as onychomycosis is a cosmetic ailment, and has no potential for long-term deleterious effects on the health.     Protective Sensation (w/ 10 gram monofilament):  Right: Intact  Left: Intact    Visual Inspection:  Onychomycosis -  Bilateral    Pedal Pulses:   Right: Diminshed  Left: Diminshed    Posterior tibialis:   Right:Diminshed  Left: Diminshed          Future Appointments   Date Time Provider Department Center   1/14/2020 11:00 AM [facility] MAMMO2 [facility] M[facility]MO [facility]   3/3/2020 11:30 AM [facility] XR2 [facility] XRAY [facility]

## 2020-01-14 NOTE — PROGRESS NOTES
Subjective:     Patient ID: Cassandra Campos is a 69 y.o. female.    Chief Complaint: Pain of the Left Wrist    The patient is a 69-year-old female with left de Quervain tendonitis.  She has tried splinting and injection without improvement.  She wishes to try a surgical release.      Past Medical History:   Diagnosis Date    Arthritis     hands    Borderline glaucoma     Gastritis     upper GI 2017    Hydradenitis     Hyperlipidemia     Hypertension     Insomnia     Migraines 2000    Morbid obesity due to excess calories 2016    Nasal septum perforation     Obesity     Pneumonia     Restrictive airway disease     Sleep apnea     SVT (supraventricular tachycardia) 2013    Type 2 diabetes mellitus     Type II or unspecified type diabetes mellitus without mention of complication, not stated as uncontrolled 2013     Past Surgical History:   Procedure Laterality Date    AXILLARY HIDRADENITIS EXCISION      BONE EXOSTOSIS EXCISION Right 2018    Procedure: EXCISION, EXOSTOSIS;  Surgeon: Jayro Pedraza Sr., MD;  Location: [facility] OR;  Service: Orthopedics;  Laterality: Right;    BREAST BIOPSY Bilateral     BREAST LUMPECTOMY Bilateral 1998    Benign    CARPAL TUNNEL RELEASE      bilateral    CATARACT EXTRACTION Bilateral     OU     SECTION  1979    CHOLECYSTECTOMY  2014    CYST REMOVAL  2015    sebaceous cyst removed from face    gastric sleeve  2017    Dr. Watson    KNEE SURGERY Right     OLECRANON BURSECTOMY Right 2018    Procedure: BURSECTOMY, OLECRANON;  Surgeon: Jayro Pedraza Sr., MD;  Location: [facility] OR;  Service: Orthopedics;  Laterality: Right;    SURGICAL REMOVAL OF BUNION WITH OSTEOTOMY OF METATARSAL BONE Left 5/10/2019    Procedure: BUNIONECTOMY, WITH METATARSAL OSTEOTOMY;  Surgeon: Srinivasan Villanueva DPM;  Location: [facility] OR;  Service: Podiatry;  Laterality: Left;    SURGICAL REMOVAL OF BUNION WITH OSTEOTOMY OF METATARSAL BONE Right  2019    Procedure: BUNIONECTOMY, WITH METATARSAL OSTEOTOMY;  Surgeon: Srinivasan Villanueva DPM;  Location: [facility];  Service: Podiatry;  Laterality: Right;    TONSILLECTOMY, ADENOIDECTOMY  1980s    TRIGGER FINGER RELEASE Right 2015    Dr. Pedraza     Family History   Problem Relation Age of Onset    Prostate cancer Brother     Diabetes Maternal Aunt      Social History     Socioeconomic History    Marital status:      Spouse name: Not on file    Number of children: 1    Years of education: Not on file    Highest education level: Not on file   Occupational History    Occupation:  aid   Social Needs    Financial resource strain: Not on file    Food insecurity:     Worry: Not on file     Inability: Not on file    Transportation needs:     Medical: Not on file     Non-medical: Not on file   Tobacco Use    Smoking status: Former Smoker     Packs/day: 0.50     Years: 30.00     Pack years: 15.00     Types: Cigarettes     Start date: 1970     Last attempt to quit: 2012     Years since quittin.0    Smokeless tobacco: Never Used   Substance and Sexual Activity    Alcohol use: Yes     Comment: rarely // No alcohol 72 HOURS prior to surgery    Drug use: No    Sexual activity: Not Currently     Partners: Male   Lifestyle    Physical activity:     Days per week: Not on file     Minutes per session: Not on file    Stress: Not on file   Relationships    Social connections:     Talks on phone: Not on file     Gets together: Not on file     Attends [employer] service: Not on file     Active member of club or organization: Not on file     Attends meetings of clubs or organizations: Not on file     Relationship status: Not on file   Other Topics Concern    Not on file   Social History Narrative    Single, part-time teacher. Masters degree biology.      Medication List with Changes/Refills   New Medications    KETOCONAZOLE (NIZORAL) 2 % CREAM    Apply topically 2 (two)  times daily.   Current Medications    ACCU-CHEK FASTCLIX MISC    USE ONE TIME DAILY    ACCU-CHEK SMARTVIEW TEST STRIP STRP    TEST ONE TIME DAILY    ALBUTEROL (PROVENTIL/VENTOLIN HFA) 90 MCG/ACTUATION INHALER    Inhale 2 puffs into the lungs every 4 to 6 hours as needed for Wheezing or Shortness of Breath. Hold for refills    AMITRIPTYLINE (ELAVIL) 100 MG TABLET    Take 1 tablet (100 mg total) by mouth nightly.    BLOOD-GLUCOSE METER KIT    Use as instructed    CICLOPIROX (PENLAC) 8 % SOLN    Apply to affected toenails at night time DAILY. On 7th day, file nails down, clean all nails with alcohol and restart application process.    ESZOPICLONE (LUNESTA) 3 MG TAB    Take 1 tablet (3 mg total) by mouth nightly as needed (sleep).    HYDROCODONE-ACETAMINOPHEN (NORCO) 5-325 MG PER TABLET    Take 1 tablet by mouth every 6 (six) hours as needed for Pain.    METFORMIN (GLUCOPHAGE-XR) 500 MG 24 HR TABLET    TAKE 2 TABLETS ONE TIME DAILY    METOPROLOL SUCCINATE (TOPROL-XL) 50 MG 24 HR TABLET    Take 1 tablet (50 mg total) by mouth once daily.    OMEPRAZOLE (PRILOSEC) 20 MG CAPSULE    Take 1 capsule (20 mg total) by mouth once daily.    PRAVASTATIN (PRAVACHOL) 40 MG TABLET    TAKE 1 TABLET BY MOUTH EVERY DAY AT NIGHT     Review of patient's allergies indicates:  No Known Allergies  Review of Systems   Constitution: Negative for malaise/fatigue.   HENT: Negative for hearing loss.    Eyes: Negative for double vision and visual disturbance.   Cardiovascular: Negative for chest pain.   Respiratory: Negative for shortness of breath.    Endocrine: Negative for cold intolerance.   Hematologic/Lymphatic: Does not bruise/bleed easily.   Skin: Negative for poor wound healing and suspicious lesions.   Musculoskeletal: Positive for arthritis, joint pain and joint swelling. Negative for gout.   Gastrointestinal: Positive for heartburn. Negative for nausea and vomiting.   Genitourinary: Negative for dysuria.   Neurological: Negative for  numbness, paresthesias and sensory change.   Psychiatric/Behavioral: Negative for depression, memory loss and substance abuse. The patient has insomnia. The patient is not nervous/anxious.    Allergic/Immunologic: Negative for persistent infections.       Objective:   Body mass index is 36.49 kg/m².  Vitals:    01/14/20 1021   BP: 134/84   Pulse: 90                General    Constitutional: She is oriented to person, place, and time. She appears well-developed and well-nourished. No distress.   HENT:   Head: Normocephalic.   Mouth/Throat: Oropharynx is clear and moist.   Eyes: EOM are normal.   Neck: Normal range of motion.   Cardiovascular: Normal rate.    Pulmonary/Chest: Effort normal.   Abdominal: Soft.   Neurological: She is alert and oriented to person, place, and time. No cranial nerve deficit.   Psychiatric: She has a normal mood and affect.         Left Hand/Wrist Exam     Inspection   Scars: Wrist - absent   Effusion: Wrist - absent     Pain   Wrist - The patient exhibits pain of the extensory musculature.    Tests   Finkelstein's test: positive      Other     Sensory Exam  Median Distribution: normal  Ulnar Distribution: normal  Radial Distribution: normal    Comments:  The patient has a tender 1st dorsal extensor tendon compartment left thumb.  There is no sign of infection.  There is a positive Finkelstein test.  There are no motor or sensory deficits.          Vascular Exam       Capillary Refill  Left Hand: normal capillary refill       radiographs left wrist were normal  Assessment:     Encounter Diagnosis   Name Primary?    Radial styloid tenosynovitis (de quervain) Yes    left wrist    Plan:     The patient wished to have a left de Quervain release.  She was counseled regarding that surgery. Risk complications and alternatives were discussed including the risk of infection, anesthetic risk, injury to nerves and vessels, loss of motion, and possible need for additional surgeries were discussed. She  seems to understand and agree that surgery. All questions were answered.              Disclaimer: This note was prepared using a voice recognition system and is likely to have sound alike errors within the text.

## 2020-01-15 ENCOUNTER — PATIENT MESSAGE (OUTPATIENT)
Dept: INTERNAL MEDICINE | Facility: CLINIC | Age: 70
End: 2020-01-15

## 2020-01-15 ENCOUNTER — ANESTHESIA EVENT (OUTPATIENT)
Dept: SURGERY | Facility: HOSPITAL | Age: 70
End: 2020-01-15
Payer: MEDICARE

## 2020-01-15 DIAGNOSIS — G47.00 INSOMNIA, UNSPECIFIED TYPE: ICD-10-CM

## 2020-01-15 PROBLEM — Z01.818 PREOPERATIVE CLEARANCE: Status: RESOLVED | Noted: 2019-05-08 | Resolved: 2020-01-15

## 2020-01-15 PROBLEM — M65.30 TRIGGER FINGER: Status: RESOLVED | Noted: 2017-06-16 | Resolved: 2020-01-15

## 2020-01-15 PROBLEM — M21.612 BUNION, LEFT FOOT: Status: RESOLVED | Noted: 2019-05-10 | Resolved: 2020-01-15

## 2020-01-15 PROBLEM — Z01.810 PREOP CARDIOVASCULAR EXAM: Status: RESOLVED | Noted: 2017-05-30 | Resolved: 2020-01-15

## 2020-01-15 PROBLEM — M65.331 TRIGGER MIDDLE FINGER OF RIGHT HAND: Status: RESOLVED | Noted: 2017-05-30 | Resolved: 2020-01-15

## 2020-01-15 PROBLEM — M89.8X9 EXOSTOSIS: Status: RESOLVED | Noted: 2018-06-06 | Resolved: 2020-01-15

## 2020-01-15 RX ORDER — ESZOPICLONE 3 MG/1
TABLET, FILM COATED ORAL
Qty: 90 TABLET | Refills: 1 | Status: SHIPPED | OUTPATIENT
Start: 2020-01-15 | End: 2020-07-01 | Stop reason: SDUPTHER

## 2020-01-15 NOTE — ANESTHESIA PREPROCEDURE EVALUATION
01/15/2020  Cassandra Campos is a 69 y.o., female.    Anesthesia Evaluation    I have reviewed the Patient Summary Reports.    I have reviewed the Nursing Notes.   I have reviewed the Medications.     Review of Systems  Anesthesia Hx:  History of prior surgery of interest to airway management or planning: Previous anesthesia: General  Denies Personal Hx of Anesthesia complications.   Social:  Former Smoker    Cardiovascular:   Hypertension hyperlipidemia ECG has been reviewed.    Pulmonary:   Pneumonia Denies Recent URI.    Hepatic/GI:   GERD, well controlled    Neurological:   Headaches    Endocrine:   Diabetes, well controlled, type 2        Physical Exam  General:  Obesity    Airway/Jaw/Neck:  Airway Findings: General Airway Assessment: Adult Jaw/Neck Findings:  Neck Findings:  Girth Increased      Dental:  Dental Findings: Upper Dentures, Lower Dentures        Mental Status:  Mental Status Findings:  Cooperative, Alert and Oriented         Anesthesia Plan  Type of Anesthesia, risks & benefits discussed:  Anesthesia Type:  MAC, regional  Patient's Preference:   Intra-op Monitoring Plan: standard ASA monitors  Intra-op Monitoring Plan Comments:   Post Op Pain Control Plan: peripheral nerve block  Post Op Pain Control Plan Comments:   Induction:   IV  Beta Blocker:  Patient is on a Beta-Blocker and has received one dose within the past 24 hours (No further documentation required).       Informed Consent: Patient understands risks and agrees with Anesthesia plan.  Questions answered. Anesthesia consent signed with patient.  ASA Score: 2     Day of Surgery Review of History & Physical:    H&P update referred to the surgeon.         Ready For Surgery From Anesthesia Perspective.

## 2020-01-16 ENCOUNTER — ANESTHESIA (OUTPATIENT)
Dept: SURGERY | Facility: HOSPITAL | Age: 70
End: 2020-01-16
Payer: MEDICARE

## 2020-01-16 ENCOUNTER — HOSPITAL ENCOUNTER (OUTPATIENT)
Facility: HOSPITAL | Age: 70
Discharge: HOME | End: 2020-01-16
Attending: ORTHOPAEDIC SURGERY | Admitting: ORTHOPAEDIC SURGERY
Payer: MEDICARE

## 2020-01-16 VITALS
OXYGEN SATURATION: 96 % | HEIGHT: 63 IN | HEART RATE: 94 BPM | BODY MASS INDEX: 37.13 KG/M2 | WEIGHT: 209.56 LBS | DIASTOLIC BLOOD PRESSURE: 86 MMHG | RESPIRATION RATE: 18 BRPM | TEMPERATURE: 98 F | SYSTOLIC BLOOD PRESSURE: 148 MMHG

## 2020-01-16 DIAGNOSIS — M65.4 DE QUERVAIN'S DISEASE (RADIAL STYLOID TENOSYNOVITIS): Primary | ICD-10-CM

## 2020-01-16 LAB
POCT GLUCOSE: 105 MG/DL (ref 70–110)
POCT GLUCOSE: 95 MG/DL (ref 70–110)

## 2020-01-16 PROCEDURE — 25000 PR INCIS TENDON SHEATH,RADIAL STYLOID: ICD-10-PCS | Mod: HCNC,LT,, | Performed by: ORTHOPAEDIC SURGERY

## 2020-01-16 PROCEDURE — 25000 INCISION OF TENDON SHEATH: CPT | Mod: HCNC,LT,, | Performed by: ORTHOPAEDIC SURGERY

## 2020-01-16 PROCEDURE — 25000003 PHARM REV CODE 250: Mod: HCNC | Performed by: ANESTHESIOLOGY

## 2020-01-16 PROCEDURE — 36000707: Mod: HCNC | Performed by: ORTHOPAEDIC SURGERY

## 2020-01-16 PROCEDURE — 71000015 HC POSTOP RECOV 1ST HR: Mod: HCNC | Performed by: ORTHOPAEDIC SURGERY

## 2020-01-16 PROCEDURE — 37000009 HC ANESTHESIA EA ADD 15 MINS: Mod: HCNC | Performed by: ORTHOPAEDIC SURGERY

## 2020-01-16 PROCEDURE — 63600175 PHARM REV CODE 636 W HCPCS: Mod: HCNC | Performed by: ANESTHESIOLOGY

## 2020-01-16 PROCEDURE — 25000003 PHARM REV CODE 250: Mod: HCNC | Performed by: ORTHOPAEDIC SURGERY

## 2020-01-16 PROCEDURE — 36000706: Mod: HCNC | Performed by: ORTHOPAEDIC SURGERY

## 2020-01-16 PROCEDURE — 64450 NJX AA&/STRD OTHER PN/BRANCH: CPT | Mod: HCNC | Performed by: ORTHOPAEDIC SURGERY

## 2020-01-16 PROCEDURE — 76942 ECHO GUIDE FOR BIOPSY: CPT | Mod: HCNC | Performed by: ANESTHESIOLOGY

## 2020-01-16 PROCEDURE — D9220A PRA ANESTHESIA: Mod: HCNC,CRNA,, | Performed by: NURSE ANESTHETIST, CERTIFIED REGISTERED

## 2020-01-16 PROCEDURE — D9220A PRA ANESTHESIA: ICD-10-PCS | Mod: HCNC,CRNA,, | Performed by: NURSE ANESTHETIST, CERTIFIED REGISTERED

## 2020-01-16 PROCEDURE — 37000008 HC ANESTHESIA 1ST 15 MINUTES: Mod: HCNC | Performed by: ORTHOPAEDIC SURGERY

## 2020-01-16 PROCEDURE — 63600175 PHARM REV CODE 636 W HCPCS: Mod: HCNC | Performed by: NURSE ANESTHETIST, CERTIFIED REGISTERED

## 2020-01-16 PROCEDURE — 71000033 HC RECOVERY, INTIAL HOUR: Mod: HCNC | Performed by: ORTHOPAEDIC SURGERY

## 2020-01-16 PROCEDURE — D9220A PRA ANESTHESIA: Mod: HCNC,ANES,, | Performed by: ANESTHESIOLOGY

## 2020-01-16 PROCEDURE — S0020 INJECTION, BUPIVICAINE HYDRO: HCPCS | Mod: HCNC | Performed by: ANESTHESIOLOGY

## 2020-01-16 PROCEDURE — D9220A PRA ANESTHESIA: ICD-10-PCS | Mod: HCNC,ANES,, | Performed by: ANESTHESIOLOGY

## 2020-01-16 RX ORDER — ACETAMINOPHEN 500 MG
1000 TABLET ORAL ONCE
Status: COMPLETED | OUTPATIENT
Start: 2020-01-16 | End: 2020-01-16

## 2020-01-16 RX ORDER — KETOROLAC TROMETHAMINE 30 MG/ML
15 INJECTION, SOLUTION INTRAMUSCULAR; INTRAVENOUS EVERY 8 HOURS PRN
Status: DISCONTINUED | OUTPATIENT
Start: 2020-01-16 | End: 2020-01-16 | Stop reason: HOSPADM

## 2020-01-16 RX ORDER — ONDANSETRON 2 MG/ML
4 INJECTION INTRAMUSCULAR; INTRAVENOUS ONCE AS NEEDED
Status: DISCONTINUED | OUTPATIENT
Start: 2020-01-16 | End: 2020-01-16 | Stop reason: HOSPADM

## 2020-01-16 RX ORDER — HYDROCODONE BITARTRATE AND ACETAMINOPHEN 5; 325 MG/1; MG/1
1 TABLET ORAL
Status: DISCONTINUED | OUTPATIENT
Start: 2020-01-16 | End: 2020-01-16 | Stop reason: HOSPADM

## 2020-01-16 RX ORDER — MEPERIDINE HYDROCHLORIDE 25 MG/ML
12.5 INJECTION INTRAMUSCULAR; INTRAVENOUS; SUBCUTANEOUS ONCE
Status: DISCONTINUED | OUTPATIENT
Start: 2020-01-16 | End: 2020-01-16 | Stop reason: HOSPADM

## 2020-01-16 RX ORDER — ALPRAZOLAM 0.25 MG/1
0.25 TABLET ORAL ONCE AS NEEDED
Status: DISCONTINUED | OUTPATIENT
Start: 2020-01-16 | End: 2020-01-16

## 2020-01-16 RX ORDER — LIDOCAINE HYDROCHLORIDE 20 MG/ML
INJECTION, SOLUTION INFILTRATION; PERINEURAL
Status: DISCONTINUED
Start: 2020-01-16 | End: 2020-01-16 | Stop reason: HOSPADM

## 2020-01-16 RX ORDER — DIPHENHYDRAMINE HYDROCHLORIDE 50 MG/ML
25 INJECTION INTRAMUSCULAR; INTRAVENOUS EVERY 6 HOURS PRN
Status: DISCONTINUED | OUTPATIENT
Start: 2020-01-16 | End: 2020-01-16 | Stop reason: HOSPADM

## 2020-01-16 RX ORDER — SODIUM CHLORIDE 0.9 % (FLUSH) 0.9 %
10 SYRINGE (ML) INJECTION
Status: DISCONTINUED | OUTPATIENT
Start: 2020-01-16 | End: 2020-01-16 | Stop reason: HOSPADM

## 2020-01-16 RX ORDER — FENTANYL CITRATE 50 UG/ML
INJECTION, SOLUTION INTRAMUSCULAR; INTRAVENOUS
Status: DISCONTINUED | OUTPATIENT
Start: 2020-01-16 | End: 2020-01-16

## 2020-01-16 RX ORDER — LIDOCAINE HYDROCHLORIDE 20 MG/ML
INJECTION, SOLUTION EPIDURAL; INFILTRATION; INTRACAUDAL; PERINEURAL
Status: DISCONTINUED | OUTPATIENT
Start: 2020-01-16 | End: 2020-01-16 | Stop reason: HOSPADM

## 2020-01-16 RX ORDER — FENTANYL CITRATE 50 UG/ML
25 INJECTION, SOLUTION INTRAMUSCULAR; INTRAVENOUS EVERY 5 MIN PRN
Status: DISCONTINUED | OUTPATIENT
Start: 2020-01-16 | End: 2020-01-16 | Stop reason: HOSPADM

## 2020-01-16 RX ORDER — LIDOCAINE HYDROCHLORIDE 10 MG/ML
0.5 INJECTION, SOLUTION EPIDURAL; INFILTRATION; INTRACAUDAL; PERINEURAL ONCE
Status: DISCONTINUED | OUTPATIENT
Start: 2020-01-16 | End: 2020-01-16 | Stop reason: HOSPADM

## 2020-01-16 RX ORDER — SODIUM CHLORIDE, SODIUM LACTATE, POTASSIUM CHLORIDE, CALCIUM CHLORIDE 600; 310; 30; 20 MG/100ML; MG/100ML; MG/100ML; MG/100ML
INJECTION, SOLUTION INTRAVENOUS CONTINUOUS
Status: DISCONTINUED | OUTPATIENT
Start: 2020-01-16 | End: 2020-01-16 | Stop reason: HOSPADM

## 2020-01-16 RX ORDER — CHLORHEXIDINE GLUCONATE ORAL RINSE 1.2 MG/ML
10 SOLUTION DENTAL 2 TIMES DAILY
Status: DISCONTINUED | OUTPATIENT
Start: 2020-01-16 | End: 2020-01-16 | Stop reason: HOSPADM

## 2020-01-16 RX ORDER — PROPOFOL 10 MG/ML
VIAL (ML) INTRAVENOUS
Status: DISCONTINUED | OUTPATIENT
Start: 2020-01-16 | End: 2020-01-16

## 2020-01-16 RX ORDER — LIDOCAINE HCL/PF 100 MG/5ML
SYRINGE (ML) INTRAVENOUS
Status: DISCONTINUED | OUTPATIENT
Start: 2020-01-16 | End: 2020-01-16

## 2020-01-16 RX ORDER — MIDAZOLAM HYDROCHLORIDE 1 MG/ML
INJECTION, SOLUTION INTRAMUSCULAR; INTRAVENOUS
Status: DISCONTINUED | OUTPATIENT
Start: 2020-01-16 | End: 2020-01-16

## 2020-01-16 RX ORDER — HYDROCODONE BITARTRATE AND ACETAMINOPHEN 5; 325 MG/1; MG/1
1 TABLET ORAL EVERY 4 HOURS PRN
Status: DISCONTINUED | OUTPATIENT
Start: 2020-01-16 | End: 2020-01-16 | Stop reason: HOSPADM

## 2020-01-16 RX ORDER — BUPIVACAINE HYDROCHLORIDE 2.5 MG/ML
INJECTION, SOLUTION INFILTRATION; PERINEURAL
Status: COMPLETED | OUTPATIENT
Start: 2020-01-16 | End: 2020-01-16

## 2020-01-16 RX ORDER — CHLORHEXIDINE GLUCONATE ORAL RINSE 1.2 MG/ML
10 SOLUTION DENTAL
Status: DISCONTINUED | OUTPATIENT
Start: 2020-01-16 | End: 2020-01-16 | Stop reason: HOSPADM

## 2020-01-16 RX ORDER — OXYCODONE AND ACETAMINOPHEN 5; 325 MG/1; MG/1
1 TABLET ORAL EVERY 6 HOURS PRN
Qty: 15 TABLET | Refills: 0 | Status: SHIPPED | OUTPATIENT
Start: 2020-01-16 | End: 2020-04-07 | Stop reason: ALTCHOICE

## 2020-01-16 RX ORDER — ALBUTEROL SULFATE 0.83 MG/ML
2.5 SOLUTION RESPIRATORY (INHALATION) EVERY 4 HOURS PRN
Status: DISCONTINUED | OUTPATIENT
Start: 2020-01-16 | End: 2020-01-16 | Stop reason: HOSPADM

## 2020-01-16 RX ADMIN — PROPOFOL 25 MG: 10 INJECTION, EMULSION INTRAVENOUS at 11:01

## 2020-01-16 RX ADMIN — BUPIVACAINE HYDROCHLORIDE 10 ML: 2.5 INJECTION, SOLUTION INFILTRATION; PERINEURAL at 09:01

## 2020-01-16 RX ADMIN — FENTANYL CITRATE 25 MCG: 50 INJECTION, SOLUTION INTRAMUSCULAR; INTRAVENOUS at 11:01

## 2020-01-16 RX ADMIN — MIDAZOLAM 1 MG: 1 INJECTION INTRAMUSCULAR; INTRAVENOUS at 11:01

## 2020-01-16 RX ADMIN — SODIUM CHLORIDE, SODIUM LACTATE, POTASSIUM CHLORIDE, AND CALCIUM CHLORIDE: 600; 310; 30; 20 INJECTION, SOLUTION INTRAVENOUS at 12:01

## 2020-01-16 RX ADMIN — LIDOCAINE HYDROCHLORIDE 50 MG: 20 INJECTION, SOLUTION INTRAVENOUS at 11:01

## 2020-01-16 RX ADMIN — DEXTROSE 2 G: 50 INJECTION, SOLUTION INTRAVENOUS at 11:01

## 2020-01-16 RX ADMIN — ACETAMINOPHEN 1000 MG: 500 TABLET ORAL at 08:01

## 2020-01-16 RX ADMIN — MIDAZOLAM 1 MG: 1 INJECTION INTRAMUSCULAR; INTRAVENOUS at 09:01

## 2020-01-16 RX ADMIN — SODIUM CHLORIDE, SODIUM LACTATE, POTASSIUM CHLORIDE, AND CALCIUM CHLORIDE: 600; 310; 30; 20 INJECTION, SOLUTION INTRAVENOUS at 08:01

## 2020-01-16 RX ADMIN — MIDAZOLAM 2 MG: 1 INJECTION INTRAMUSCULAR; INTRAVENOUS at 11:01

## 2020-01-16 NOTE — DISCHARGE SUMMARY
[facility] ED Services  Discharge Note  Short Stay    OUTCOME: Patient tolerated treatment/procedure well without complication and is now ready for discharge.    DISPOSITION: Home or Self Care    FINAL DIAGNOSIS:  De Quervain's disease (radial styloid tenosynovitis) left wrist    FOLLOWUP: In orthopedic clinic

## 2020-01-16 NOTE — INTERVAL H&P NOTE
The patient has been examined and the H&P has been reviewed:    I concur with the findings and no changes have occurred since H&P was written.    Anesthesia/Surgery risks, benefits and alternative options discussed and understood by patient/family.          Active Hospital Problems    Diagnosis  POA    *De Quervain's disease (radial styloid tenosynovitis) [M65.4]  Yes      Resolved Hospital Problems   No resolved problems to display.

## 2020-01-16 NOTE — OP NOTE
[facility]  General Surgery  Operative Note    SUMMARY     Date of Procedure: 1/16/2020     Procedure: Procedure(s) (LRB):  RELEASE, HAND, FOR DEQUERVAIN'S TENOSYNOVITIS (Left)       Surgeon(s) and Role:     * Jaime Oswald MD - Primary    Assisting Surgeon: None    Pre-Operative Diagnosis: Radial styloid tenosynovitis (de quervain) [M65.4]    Post-Operative Diagnosis: Post-Op Diagnosis Codes:     * Radial styloid tenosynovitis (de quervain) [M65.4]    Anesthesia: General    Description:  The patient was taken to the operating room where 10 cc of 2% plain lidocaine use local anesthetic about the radial styloid area. Left wrist. Satisfactory anesthesia had been achieved the left arm was prepped and draped in the usual sterile fashion.  After exsanguination of the extremity a proximal tourniquet was inflated 250 mm of mercury after the patient received 2 g of Ancef intravenously preoperatively.  At this time a transverse incision was made 1 cm proximal to the radial styloid.  The incision extended using blunt sharp dissection using 3.5 loupe magnification.  Cutaneous neurovascular structures were identified and carefully retracted.  The tendon sheath of the 1st dorsal extensor tendon compartment was opened.  It was completely released and a search for a separate compartment for the extensor pollicis brevis was performed and there was no separate compartment.  Satisfactory release had been achieved skin was closed using interrupted 4 nylon suture.  Xeroform gauze 4x4s and 2 ABD pads over wrapped with 3 in gauze dressing.  The patient tolerated the procedure well and was transferred to the recovery room in satisfactory condition    Significant Surgical Tasks Conducted by the Assistant(s), if Applicable:  No assistant    Complications:  None     Estimated Blood Loss (EBL):  5 mL           Implants: None    Specimens: None           Condition: Good    Disposition: PACU - hemodynamically  stable.    Attestation: I was present and scrubbed for the entire procedure.

## 2020-01-16 NOTE — ANESTHESIA PROCEDURE NOTES
Peripheral Block    Patient location during procedure: holding area    Reason for block: primary anesthetic   Diagnosis: left De Quervain's tenosynovitis   Start time: 1/16/2020 9:48 AM  Timeout: 1/16/2020 9:46 AM   End time: 1/16/2020 9:52 AM    Staffing  Authorizing Provider: Naman Frausto MD  Performing Provider: Naman Frausto MD    Preanesthetic Checklist  Completed: patient identified, site marked, surgical consent, pre-op evaluation, timeout performed, IV checked, risks and benefits discussed and monitors and equipment checked  Peripheral Block  Patient position: supine  Prep: ChloraPrep  Patient monitoring: continuous pulse ox, cardiac monitor, heart rate and frequent blood pressure checks  Block type: forearm - radial  Laterality: left  Injection technique: single shot  Needle  Needle type: Quincke   Needle gauge: 25 G  Needle length: 1.5 in  Needle localization: ultrasound guidance   -ultrasound image captured on disc.  Assessment  Injection assessment: negative aspiration, negative parasthesia and local visualized surrounding nerve  Heart rate change: no  Slow fractionated injection: yes

## 2020-01-16 NOTE — TRANSFER OF CARE
"Anesthesia Transfer of Care Note    Patient: Cassandra Campos    Procedure(s) Performed: Procedure(s) (LRB):  RELEASE, HAND, FOR DEQUERVAIN'S TENOSYNOVITIS (Left)    Patient location: PACU    Anesthesia Type: MAC    Transport from OR: Transported from OR on room air with adequate spontaneous ventilation    Post pain: adequate analgesia    Post assessment: no apparent anesthetic complications and tolerated procedure well    Post vital signs: stable    Level of consciousness: awake, alert and oriented    Nausea/Vomiting: no nausea/vomiting    Complications: none    Transfer of care protocol was followed      Last vitals:   Visit Vitals  /75   Pulse 80   Temp 36.7 °C (98.1 °F) (Temporal)   Resp 12   Ht 5' 3" (1.6 m)   Wt 95.1 kg (209 lb 8.8 oz)   SpO2 96%   Breastfeeding? No   BMI 37.12 kg/m²     "

## 2020-01-16 NOTE — ANESTHESIA POSTPROCEDURE EVALUATION
Anesthesia Post Evaluation    Patient: Cassandra Campos    Procedure(s) Performed: Procedure(s) (LRB):  RELEASE, HAND, FOR DEQUERVAIN'S TENOSYNOVITIS (Left)    Final Anesthesia Type: MAC    Patient location during evaluation: PACU  Patient participation: Yes- Able to Participate  Level of consciousness: awake and alert and oriented  Post-procedure vital signs: reviewed and stable  Pain management: adequate  Airway patency: patent    PONV status at discharge: No PONV  Anesthetic complications: no      Cardiovascular status: hemodynamically stable  Respiratory status: unassisted  Hydration status: euvolemic  Follow-up not needed.          Vitals Value Taken Time   /85 1/16/2020 12:20 PM   Temp 36.7 °C (98.1 °F) 1/16/2020  7:57 AM   Pulse 99 1/16/2020 12:21 PM   Resp 11 1/16/2020 12:21 PM   SpO2 99 % 1/16/2020 12:21 PM   Vitals shown include unvalidated device data.      No case tracking events are documented in the log.      Pain/Francheska Score: Pain Rating Prior to Med Admin: 9 (1/16/2020  8:09 AM)

## 2020-01-21 ENCOUNTER — OFFICE VISIT (OUTPATIENT)
Dept: ORTHOPEDICS | Facility: CLINIC | Age: 70
End: 2020-01-21
Payer: MEDICARE

## 2020-01-21 ENCOUNTER — PATIENT MESSAGE (OUTPATIENT)
Dept: ORTHOPEDICS | Facility: CLINIC | Age: 70
End: 2020-01-21

## 2020-01-21 VITALS
BODY MASS INDEX: 37.03 KG/M2 | HEART RATE: 111 BPM | WEIGHT: 209 LBS | HEIGHT: 63 IN | DIASTOLIC BLOOD PRESSURE: 96 MMHG | SYSTOLIC BLOOD PRESSURE: 153 MMHG

## 2020-01-21 DIAGNOSIS — M65.4 DE QUERVAIN'S DISEASE (RADIAL STYLOID TENOSYNOVITIS): Primary | ICD-10-CM

## 2020-01-21 PROCEDURE — 20550 TENDON SHEATH: ICD-10-PCS | Mod: HCNC,79,RT,S$GLB | Performed by: ORTHOPAEDIC SURGERY

## 2020-01-21 PROCEDURE — 20550 NJX 1 TENDON SHEATH/LIGAMENT: CPT | Mod: HCNC,79,RT,S$GLB | Performed by: ORTHOPAEDIC SURGERY

## 2020-01-21 PROCEDURE — 99999 PR PBB SHADOW E&M-EST. PATIENT-LVL III: ICD-10-PCS | Mod: PBBFAC,HCNC,, | Performed by: ORTHOPAEDIC SURGERY

## 2020-01-21 PROCEDURE — 99024 POSTOP FOLLOW-UP VISIT: CPT | Mod: HCNC,S$GLB,, | Performed by: ORTHOPAEDIC SURGERY

## 2020-01-21 PROCEDURE — 99999 PR PBB SHADOW E&M-EST. PATIENT-LVL III: CPT | Mod: PBBFAC,HCNC,, | Performed by: ORTHOPAEDIC SURGERY

## 2020-01-21 PROCEDURE — 99024 PR POST-OP FOLLOW-UP VISIT: ICD-10-PCS | Mod: HCNC,S$GLB,, | Performed by: ORTHOPAEDIC SURGERY

## 2020-01-21 RX ORDER — TRIAMCINOLONE ACETONIDE 40 MG/ML
40 INJECTION, SUSPENSION INTRA-ARTICULAR; INTRAMUSCULAR
Status: DISCONTINUED | OUTPATIENT
Start: 2020-01-21 | End: 2020-01-21 | Stop reason: HOSPADM

## 2020-01-21 RX ADMIN — TRIAMCINOLONE ACETONIDE 40 MG: 40 INJECTION, SUSPENSION INTRA-ARTICULAR; INTRAMUSCULAR at 03:01

## 2020-01-21 NOTE — PROCEDURES
R first doral compartmentTendon Sheath  Date/Time: 1/21/2020 3:40 PM  Performed by: Jaime Oswald MD  Authorized by: Jaime Oswald MD     Consent Done?: Yes (Verbal)  Timeout: prior to procedure the correct patient, procedure, and site was verified    Timeout: prior to procedure the correct patient, procedure, and site was verified    Location:  Wrist  Prep: patient was prepped and draped in usual sterile fashion    Needle size:  25 G  Approach:  Radial  Medications:  40 mg triamcinolone acetonide 40 mg/mL

## 2020-01-21 NOTE — PROGRESS NOTES
Subjective:     Patient ID: Cassandra Campos is a 69 y.o. female.    Chief Complaint: Pain and Post-op Evaluation of the Left Hand    The patient is a 69-year-old female seen in follow-up after left de Quervain release.  Date of that surgery was 2020.  She is doing well with that but complains of right wrist de Quervain tendonitis that she wishes to have injected today.      Past Medical History:   Diagnosis Date    Arthritis     hands    Bilateral bunions     Borderline glaucoma     De Quervain's disease (radial styloid tenosynovitis)     Gastritis     upper GI 2017    Hydradenitis     Hyperlipidemia     Hypertension     Insomnia     Migraines 2000    Nasal septum perforation     Obesity     Pneumonia     Restrictive airway disease     Sleep apnea     SVT (supraventricular tachycardia) 2013    Trigger finger     Type 2 diabetes mellitus      Past Surgical History:   Procedure Laterality Date    AXILLARY HIDRADENITIS EXCISION      BONE EXOSTOSIS EXCISION Right 2018    Procedure: EXCISION, EXOSTOSIS;  Surgeon: Jayro Pedraza Sr., MD;  Location: [facility] OR;  Service: Orthopedics;  Laterality: Right;    BREAST BIOPSY Bilateral     BREAST LUMPECTOMY Bilateral 1998    Benign    CARPAL TUNNEL RELEASE      bilateral    CATARACT EXTRACTION Bilateral     OU     SECTION  1979    CHOLECYSTECTOMY  2014    CYST REMOVAL  2015    sebaceous cyst removed from face    DE QUERVAIN'S RELEASE Left 2020    Procedure: RELEASE, HAND, FOR DEQUERVAIN'S TENOSYNOVITIS;  Surgeon: Jaime Oswald MD;  Location: [facility] OR;  Service: Orthopedics;  Laterality: Left;    gastric sleeve  2017    Dr. Watson    KNEE SURGERY Right     OLECRANON BURSECTOMY Right 2018    Procedure: BURSECTOMY, OLECRANON;  Surgeon: Jayro Pedraza Sr., MD;  Location: [facility] OR;  Service: Orthopedics;  Laterality: Right;    SURGICAL REMOVAL OF BUNION WITH OSTEOTOMY OF METATARSAL BONE Left  5/10/2019    Procedure: BUNIONECTOMY, WITH METATARSAL OSTEOTOMY;  Surgeon: Srinivasan Villanueva DPM;  Location: [facility] OR;  Service: Podiatry;  Laterality: Left;    SURGICAL REMOVAL OF BUNION WITH OSTEOTOMY OF METATARSAL BONE Right 2019    Procedure: BUNIONECTOMY, WITH METATARSAL OSTEOTOMY;  Surgeon: Srinivasan Villanueva DPM;  Location: [facility] OR;  Service: Podiatry;  Laterality: Right;    TONSILLECTOMY, ADENOIDECTOMY  1980s    TRIGGER FINGER RELEASE Right 2015    Dr. Pedraza     Family History   Problem Relation Age of Onset    Prostate cancer Brother     Diabetes Maternal Aunt      Social History     Socioeconomic History    Marital status:      Spouse name: Not on file    Number of children: 1    Years of education: Not on file    Highest education level: Not on file   Occupational History    Occupation:  aid   Social Needs    Financial resource strain: Not on file    Food insecurity:     Worry: Not on file     Inability: Not on file    Transportation needs:     Medical: Not on file     Non-medical: Not on file   Tobacco Use    Smoking status: Former Smoker     Packs/day: 0.50     Years: 30.00     Pack years: 15.00     Types: Cigarettes     Start date: 1970     Last attempt to quit: 2012     Years since quittin.0    Smokeless tobacco: Never Used   Substance and Sexual Activity    Alcohol use: Yes     Comment: rarely // No alcohol 72 HOURS prior to surgery    Drug use: No    Sexual activity: Not Currently     Partners: Male   Lifestyle    Physical activity:     Days per week: Not on file     Minutes per session: Not on file    Stress: Not on file   Relationships    Social connections:     Talks on phone: Not on file     Gets together: Not on file     Attends Zoroastrianism service: Not on file     Active member of club or organization: Not on file     Attends meetings of clubs or organizations: Not on file     Relationship status: Not on file   Other Topics  Concern    Not on file   Social History Narrative    Single, part-time teacher. Masters degree biology.      Medication List with Changes/Refills   Current Medications    ACCU-CHEK FASTCLIX MISC    USE ONE TIME DAILY    ACCU-CHEK SMARTVIEW TEST STRIP STRP    TEST ONE TIME DAILY    ALBUTEROL (PROVENTIL/VENTOLIN HFA) 90 MCG/ACTUATION INHALER    Inhale 2 puffs into the lungs every 4 to 6 hours as needed for Wheezing or Shortness of Breath. Hold for refills    AMITRIPTYLINE (ELAVIL) 100 MG TABLET    Take 1 tablet (100 mg total) by mouth nightly.    BLOOD-GLUCOSE METER KIT    Use as instructed    CICLOPIROX (PENLAC) 8 % SOLN    Apply to affected toenails at night time DAILY. On 7th day, file nails down, clean all nails with alcohol and restart application process.    ESZOPICLONE (LUNESTA) 3 MG TAB    TAKE 1 TABLET EVERY NIGHT AS NEEDED FOR SLEEP    HYDROCODONE-ACETAMINOPHEN (NORCO) 5-325 MG PER TABLET    Take 1 tablet by mouth every 6 (six) hours as needed for Pain.    KETOCONAZOLE (NIZORAL) 2 % CREAM    Apply topically 2 (two) times daily.    METFORMIN (GLUCOPHAGE-XR) 500 MG 24 HR TABLET    TAKE 2 TABLETS ONE TIME DAILY    METOPROLOL SUCCINATE (TOPROL-XL) 50 MG 24 HR TABLET    Take 1 tablet (50 mg total) by mouth once daily.    OMEPRAZOLE (PRILOSEC) 20 MG CAPSULE    Take 1 capsule (20 mg total) by mouth once daily.    OXYCODONE-ACETAMINOPHEN (PERCOCET) 5-325 MG PER TABLET    Take 1 tablet by mouth every 6 (six) hours as needed for Pain.    PRAVASTATIN (PRAVACHOL) 40 MG TABLET    TAKE 1 TABLET BY MOUTH EVERY DAY AT NIGHT     Review of patient's allergies indicates:  No Known Allergies  Review of Systems   Constitution: Negative for malaise/fatigue.   HENT: Negative for hearing loss.    Eyes: Negative for double vision and visual disturbance.   Cardiovascular: Negative for chest pain.   Respiratory: Negative for shortness of breath.    Endocrine: Negative for cold intolerance.   Hematologic/Lymphatic: Does not  bruise/bleed easily.   Skin: Negative for poor wound healing and suspicious lesions.   Musculoskeletal: Positive for arthritis, joint pain and joint swelling. Negative for gout.   Gastrointestinal: Positive for heartburn. Negative for nausea and vomiting.   Genitourinary: Negative for dysuria.   Neurological: Negative for numbness, paresthesias and sensory change.   Psychiatric/Behavioral: Negative for depression, memory loss and substance abuse. The patient has insomnia. The patient is not nervous/anxious.    Allergic/Immunologic: Negative for persistent infections.       Objective:   Body mass index is 37.02 kg/m².  Vitals:    01/21/20 1553   BP: (!) 153/96   Pulse: (!) 111                General    Constitutional: She is oriented to person, place, and time. She appears well-developed and well-nourished. No distress.   HENT:   Head: Normocephalic.   Eyes: EOM are normal.   Neck: Normal range of motion.   Pulmonary/Chest: Effort normal.   Neurological: She is oriented to person, place, and time.   Psychiatric: She has a normal mood and affect.             Right Hand/Wrist Exam     Inspection   Scars: Wrist - absent   Effusion: Wrist - absent     Pain   Hand - The patient exhibits pain of the extensory musculature.    Tests   Finkelstein's test: positive      Other     Neuorologic Exam    Median Distribution: normal  Ulnar Distribution: normal  Radial Distribution: normal    Comments:  The patient has a tenderness about the 1st dorsal extensor tendon compartment and a positive Finkelstein test.  There are no motor or sensory deficits.      Left Hand/Wrist Exam     Inspection   Scars: Wrist - present   Effusion: Wrist - absent     Other     Sensory Exam  Median Distribution: normal  Ulnar Distribution: normal  Radial Distribution: normal    Comments:  The suture line is intact radial styloid area left wrist.  There is no sign of infection.  There are no motor or sensory deficits.          Vascular Exam        Capillary Refill  Right Hand: normal capillary refill  Left Hand: normal capillary refill         radiographs were not obtained today  Assessment:     Encounter Diagnosis   Name Primary?    De Quervain's disease (radial styloid tenosynovitis) Yes    status post loop left de Quervain release    Plan:     The patient was injected in the right wrist 1st dorsal extensor tendon compartment with 0.5 cc of Kenalog and 0.5 cc of 2% plain lidocaine under sterile technique.  The left wrist incision was clean with peroxide a Band-Aid was applied. She will return in 1 week for suture removal.                Disclaimer: This note was prepared using a voice recognition system and is likely to have sound alike errors within the text.

## 2020-01-29 ENCOUNTER — OFFICE VISIT (OUTPATIENT)
Dept: ORTHOPEDICS | Facility: CLINIC | Age: 70
End: 2020-01-29
Payer: MEDICARE

## 2020-01-29 VITALS
HEIGHT: 63 IN | SYSTOLIC BLOOD PRESSURE: 128 MMHG | BODY MASS INDEX: 37.03 KG/M2 | WEIGHT: 209 LBS | HEART RATE: 95 BPM | DIASTOLIC BLOOD PRESSURE: 84 MMHG

## 2020-01-29 DIAGNOSIS — M65.4 DE QUERVAIN'S DISEASE (RADIAL STYLOID TENOSYNOVITIS): Primary | ICD-10-CM

## 2020-01-29 PROCEDURE — 99999 PR PBB SHADOW E&M-EST. PATIENT-LVL III: CPT | Mod: PBBFAC,HCNC,, | Performed by: ORTHOPAEDIC SURGERY

## 2020-01-29 PROCEDURE — 99024 PR POST-OP FOLLOW-UP VISIT: ICD-10-PCS | Mod: HCNC,S$GLB,, | Performed by: ORTHOPAEDIC SURGERY

## 2020-01-29 PROCEDURE — 99024 POSTOP FOLLOW-UP VISIT: CPT | Mod: HCNC,S$GLB,, | Performed by: ORTHOPAEDIC SURGERY

## 2020-01-29 PROCEDURE — 99999 PR PBB SHADOW E&M-EST. PATIENT-LVL III: ICD-10-PCS | Mod: PBBFAC,HCNC,, | Performed by: ORTHOPAEDIC SURGERY

## 2020-01-29 RX ORDER — HYDROCODONE BITARTRATE AND ACETAMINOPHEN 5; 325 MG/1; MG/1
1 TABLET ORAL EVERY 6 HOURS PRN
Qty: 15 TABLET | Refills: 0 | Status: SHIPPED | OUTPATIENT
Start: 2020-01-29 | End: 2020-04-07 | Stop reason: ALTCHOICE

## 2020-01-29 NOTE — PROGRESS NOTES
Subjective:     Patient ID: Cassandra Campos is a 69 y.o. female.    Chief Complaint: Pain and Post-op Evaluation of the Left Hand    The patient is a 69-year-old female seen in follow-up after left de Quervain release.  She is doing well with that surgery and comes in for suture removal.  Date of surgery was 2020.  She says she has significant pain in the right wrist. She was injected on her last visit.  She wants pain pills until the shot kicks in.      Past Medical History:   Diagnosis Date    Arthritis     hands    Bilateral bunions     Borderline glaucoma     De Quervain's disease (radial styloid tenosynovitis)     Gastritis     upper GI 2017    Hydradenitis     Hyperlipidemia     Hypertension     Insomnia     Migraines 2000    Nasal septum perforation     Obesity     Pneumonia     Restrictive airway disease     Sleep apnea     SVT (supraventricular tachycardia) 2013    Trigger finger     Type 2 diabetes mellitus      Past Surgical History:   Procedure Laterality Date    AXILLARY HIDRADENITIS EXCISION      BONE EXOSTOSIS EXCISION Right 2018    Procedure: EXCISION, EXOSTOSIS;  Surgeon: Jayro Pedraza Sr., MD;  Location: [facility];  Service: Orthopedics;  Laterality: Right;    BREAST BIOPSY Bilateral     BREAST LUMPECTOMY Bilateral 1998    Benign    CARPAL TUNNEL RELEASE      bilateral    CATARACT EXTRACTION Bilateral     OU     SECTION  1979    CHOLECYSTECTOMY  2014    CYST REMOVAL  2015    sebaceous cyst removed from face    DE QUERVAIN'S RELEASE Left 2020    Procedure: RELEASE, HAND, FOR DEQUERVAIN'S TENOSYNOVITIS;  Surgeon: Jaime Oswald MD;  Location: [facility] OR;  Service: Orthopedics;  Laterality: Left;    gastric sleeve  2017    Dr. Watson    KNEE SURGERY Right     OLECRANON BURSECTOMY Right 2018    Procedure: BURSECTOMY, OLECRANON;  Surgeon: Jayro Pedraza Sr., MD;  Location: [facility];  Service: Orthopedics;   Laterality: Right;    SURGICAL REMOVAL OF BUNION WITH OSTEOTOMY OF METATARSAL BONE Left 5/10/2019    Procedure: BUNIONECTOMY, WITH METATARSAL OSTEOTOMY;  Surgeon: Srinivasan Villanueva DPM;  Location: [facility] OR;  Service: Podiatry;  Laterality: Left;    SURGICAL REMOVAL OF BUNION WITH OSTEOTOMY OF METATARSAL BONE Right 2019    Procedure: BUNIONECTOMY, WITH METATARSAL OSTEOTOMY;  Surgeon: Srinivasan Villanueva DPM;  Location: [facility] OR;  Service: Podiatry;  Laterality: Right;    TONSILLECTOMY, ADENOIDECTOMY  1980s    TRIGGER FINGER RELEASE Right 2015    Dr. Pedraza     Family History   Problem Relation Age of Onset    Prostate cancer Brother     Diabetes Maternal Aunt      Social History     Socioeconomic History    Marital status:      Spouse name: Not on file    Number of children: 1    Years of education: Not on file    Highest education level: Not on file   Occupational History    Occupation:  aid   Social Needs    Financial resource strain: Not on file    Food insecurity:     Worry: Not on file     Inability: Not on file    Transportation needs:     Medical: Not on file     Non-medical: Not on file   Tobacco Use    Smoking status: Former Smoker     Packs/day: 0.50     Years: 30.00     Pack years: 15.00     Types: Cigarettes     Start date: 1970     Last attempt to quit: 2012     Years since quittin.0    Smokeless tobacco: Never Used   Substance and Sexual Activity    Alcohol use: Yes     Comment: rarely // No alcohol 72 HOURS prior to surgery    Drug use: No    Sexual activity: Not Currently     Partners: Male   Lifestyle    Physical activity:     Days per week: Not on file     Minutes per session: Not on file    Stress: Not on file   Relationships    Social connections:     Talks on phone: Not on file     Gets together: Not on file     Attends Buddhist service: Not on file     Active member of club or organization: Not on file     Attends meetings of  clubs or organizations: Not on file     Relationship status: Not on file   Other Topics Concern    Not on file   Social History Narrative    Single, part-time teacher. Masters degree biology.      Medication List with Changes/Refills   Current Medications    ACCU-CHEK FASTCLIX MISC    USE ONE TIME DAILY    ACCU-CHEK SMARTVIEW TEST STRIP STRP    TEST ONE TIME DAILY    ALBUTEROL (PROVENTIL/VENTOLIN HFA) 90 MCG/ACTUATION INHALER    Inhale 2 puffs into the lungs every 4 to 6 hours as needed for Wheezing or Shortness of Breath. Hold for refills    AMITRIPTYLINE (ELAVIL) 100 MG TABLET    Take 1 tablet (100 mg total) by mouth nightly.    BLOOD-GLUCOSE METER KIT    Use as instructed    CICLOPIROX (PENLAC) 8 % SOLN    Apply to affected toenails at night time DAILY. On 7th day, file nails down, clean all nails with alcohol and restart application process.    ESZOPICLONE (LUNESTA) 3 MG TAB    TAKE 1 TABLET EVERY NIGHT AS NEEDED FOR SLEEP    HYDROCODONE-ACETAMINOPHEN (NORCO) 5-325 MG PER TABLET    Take 1 tablet by mouth every 6 (six) hours as needed for Pain.    KETOCONAZOLE (NIZORAL) 2 % CREAM    Apply topically 2 (two) times daily.    METFORMIN (GLUCOPHAGE-XR) 500 MG 24 HR TABLET    TAKE 2 TABLETS ONE TIME DAILY    METOPROLOL SUCCINATE (TOPROL-XL) 50 MG 24 HR TABLET    Take 1 tablet (50 mg total) by mouth once daily.    OMEPRAZOLE (PRILOSEC) 20 MG CAPSULE    Take 1 capsule (20 mg total) by mouth once daily.    OXYCODONE-ACETAMINOPHEN (PERCOCET) 5-325 MG PER TABLET    Take 1 tablet by mouth every 6 (six) hours as needed for Pain.    PRAVASTATIN (PRAVACHOL) 40 MG TABLET    TAKE 1 TABLET BY MOUTH EVERY DAY AT NIGHT     Review of patient's allergies indicates:  No Known Allergies  Review of Systems   Constitution: Negative for malaise/fatigue.   HENT: Negative for hearing loss.    Eyes: Negative for double vision and visual disturbance.   Cardiovascular: Negative for chest pain.   Respiratory: Negative for shortness of breath.     Endocrine: Negative for cold intolerance.   Hematologic/Lymphatic: Does not bruise/bleed easily.   Skin: Negative for poor wound healing and suspicious lesions.   Musculoskeletal: Negative for gout, joint pain and joint swelling.   Gastrointestinal: Positive for heartburn. Negative for nausea and vomiting.   Genitourinary: Negative for dysuria.   Neurological: Negative for numbness, paresthesias and sensory change.   Psychiatric/Behavioral: Negative for depression, memory loss and substance abuse. The patient has insomnia. The patient is not nervous/anxious.    Allergic/Immunologic: Negative for persistent infections.       Objective:   Body mass index is 37.02 kg/m².  Vitals:    01/29/20 1543   BP: 128/84   Pulse: 95                General    Constitutional: She is oriented to person, place, and time. She appears well-developed and well-nourished. No distress.   HENT:   Head: Normocephalic.   Eyes: EOM are normal.   Neck: Normal range of motion.   Pulmonary/Chest: Effort normal.   Neurological: She is oriented to person, place, and time.   Psychiatric: She has a normal mood and affect.             Right Hand/Wrist Exam     Inspection   Scars: Wrist - absent   Effusion: Wrist - absent     Pain   Wrist - The patient exhibits pain of the extensory musculature.    Tenderness   The patient is tender to palpation of the radial area.    Tests   Finkelstein's test: positive      Other     Neuorologic Exam    Median Distribution: normal  Ulnar Distribution: normal  Radial Distribution: normal    Comments:  She is tender over the 1st dorsal extensor tendon compartment with a positive Finkelstein test.  There are no motor or sensory deficits.      Left Hand/Wrist Exam     Inspection   Scars: Wrist - present   Effusion: Wrist - absent     Other     Sensory Exam  Median Distribution: normal  Ulnar Distribution: normal  Radial Distribution: normal    Comments:  The suture lines intact is no sign of infection.  There are no  motor or sensory deficits.          Vascular Exam       Capillary Refill  Right Hand: normal capillary refill  Left Hand: normal capillary refill            radiographs were not obtained today  Assessment:     Encounter Diagnosis   Name Primary?    De Quervain's disease (radial styloid tenosynovitis) Yes    status post left de Quervain release  Right de Quervain tendonitis    Plan:     The patient had the sutures removed today left wrist.  She was given Norco 5 mg 15.  One p.o. t.i.d. p.r.n. pain. She will return in a month or so if the shot is not helpful.              Disclaimer: This note was prepared using a voice recognition system and is likely to have sound alike errors within the text.

## 2020-02-25 ENCOUNTER — PATIENT MESSAGE (OUTPATIENT)
Dept: INTERNAL MEDICINE | Facility: CLINIC | Age: 70
End: 2020-02-25

## 2020-02-25 DIAGNOSIS — E11.9 CONTROLLED TYPE 2 DIABETES MELLITUS WITHOUT COMPLICATION, WITHOUT LONG-TERM CURRENT USE OF INSULIN: Primary | ICD-10-CM

## 2020-02-25 DIAGNOSIS — R74.8 ELEVATED LIVER ENZYMES: ICD-10-CM

## 2020-02-26 NOTE — TELEPHONE ENCOUNTER
Please contact patient and schedule her for labs on April 7th as requested.  Also schedule her for a visit to follow.

## 2020-03-05 DIAGNOSIS — E11.9 CONTROLLED TYPE 2 DIABETES MELLITUS WITHOUT COMPLICATION, WITHOUT LONG-TERM CURRENT USE OF INSULIN: ICD-10-CM

## 2020-03-06 RX ORDER — METFORMIN HYDROCHLORIDE 500 MG/1
TABLET, EXTENDED RELEASE ORAL
Qty: 180 TABLET | Refills: 0 | Status: SHIPPED | OUTPATIENT
Start: 2020-03-06 | End: 2020-05-19

## 2020-03-20 ENCOUNTER — PATIENT MESSAGE (OUTPATIENT)
Dept: ADMINISTRATIVE | Facility: OTHER | Age: 70
End: 2020-03-20

## 2020-03-23 ENCOUNTER — TELEPHONE (OUTPATIENT)
Dept: PULMONOLOGY | Facility: CLINIC | Age: 70
End: 2020-03-23

## 2020-03-23 DIAGNOSIS — E11.9 TYPE 2 DIABETES MELLITUS: ICD-10-CM

## 2020-03-23 DIAGNOSIS — J98.11 ATELECTASIS OF RIGHT LUNG: ICD-10-CM

## 2020-03-23 DIAGNOSIS — R91.1 LUNG NODULE: Primary | ICD-10-CM

## 2020-03-23 NOTE — Clinical Note
Patient has an abnormal chest x-ray of 01/14/2020 as well as an abnormal CT scan from 2017.  Issues are noted on the report of both radiographic studies that require further investigation.Please cancel order for chest x-ray.  Reschedule the patient for CT scan of the chest within 60 days of this order.  New order for CT scan submitted.

## 2020-03-23 NOTE — PROGRESS NOTES
Patient has an abnormal chest x-ray of 01/14/2020 as well as an abnormal CT scan from 2017.  Issues are noted on the report of both radiographic studies that require further investigation.  Please cancel order for chest x-ray.  Reschedule the patient for CT scan of the chest within 60 days of this order.  New order for CT scan submitted.

## 2020-03-23 NOTE — TELEPHONE ENCOUNTER
----- Message from Nixon Mcdermott MD sent at 3/23/2020 12:20 PM CDT -----  Patient has an abnormal chest x-ray of 01/14/2020 as well as an abnormal CT scan from 2017.  Issues are noted on the report of both radiographic studies that require further investigation.  Please cancel order for chest x-ray.  Reschedule the patient for CT scan of the chest within 60 days of this order.  New order for CT scan submitted.      ----- Message -----  From: Vladislav Solorzano LPN  Sent: 3/20/2020   2:58 PM CDT  To: Nixon Mcdermott MD        ----- Message -----  From: RT Kayley  Sent: 3/20/2020  12:46 PM CDT  To: [facility] Staff    Due to concerns associated with Covid19 the radiology team is seeking to reschedule outpatient diagnostic exams between 3/21 - 4/21 that have been ordered prior to 3/19. Cassandra  is scheduled for x-ray on 4/7/2020 at [facility].  Please respond to this message if you believe it is safe to postpone this exam and the radiology team will reschedule and update you on the patient's response.  If you have concerns that postponement is not safe (urgent or time sensitive diagnostic study), then reply and it will be performed as ordered.   If further discussion needed, please provide a contact number and a Radiologist will reach out to you shortly

## 2020-04-02 ENCOUNTER — PATIENT MESSAGE (OUTPATIENT)
Dept: INTERNAL MEDICINE | Facility: CLINIC | Age: 70
End: 2020-04-02

## 2020-04-07 ENCOUNTER — OFFICE VISIT (OUTPATIENT)
Dept: INTERNAL MEDICINE | Facility: CLINIC | Age: 70
End: 2020-04-07
Payer: MEDICARE

## 2020-04-07 VITALS — SYSTOLIC BLOOD PRESSURE: 131 MMHG | DIASTOLIC BLOOD PRESSURE: 91 MMHG | HEART RATE: 94 BPM

## 2020-04-07 DIAGNOSIS — E78.5 HYPERLIPIDEMIA ASSOCIATED WITH TYPE 2 DIABETES MELLITUS: ICD-10-CM

## 2020-04-07 DIAGNOSIS — I10 ESSENTIAL HYPERTENSION: ICD-10-CM

## 2020-04-07 DIAGNOSIS — I47.10 PAROXYSMAL SVT (SUPRAVENTRICULAR TACHYCARDIA): ICD-10-CM

## 2020-04-07 DIAGNOSIS — F41.0 PANIC ATTACK AS REACTION TO STRESS: ICD-10-CM

## 2020-04-07 DIAGNOSIS — E11.69 HYPERLIPIDEMIA ASSOCIATED WITH TYPE 2 DIABETES MELLITUS: ICD-10-CM

## 2020-04-07 DIAGNOSIS — F43.0 PANIC ATTACK AS REACTION TO STRESS: ICD-10-CM

## 2020-04-07 DIAGNOSIS — F43.0 STRESS REACTION: Primary | ICD-10-CM

## 2020-04-07 DIAGNOSIS — E11.9 CONTROLLED TYPE 2 DIABETES MELLITUS WITHOUT COMPLICATION, WITHOUT LONG-TERM CURRENT USE OF INSULIN: ICD-10-CM

## 2020-04-07 DIAGNOSIS — K21.9 GASTROESOPHAGEAL REFLUX DISEASE, ESOPHAGITIS PRESENCE NOT SPECIFIED: ICD-10-CM

## 2020-04-07 PROCEDURE — 99213 PR OFFICE/OUTPT VISIT, EST, LEVL III, 20-29 MIN: ICD-10-PCS | Mod: HCNC,95,, | Performed by: FAMILY MEDICINE

## 2020-04-07 PROCEDURE — 1159F PR MEDICATION LIST DOCUMENTED IN MEDICAL RECORD: ICD-10-PCS | Mod: HCNC,95,, | Performed by: FAMILY MEDICINE

## 2020-04-07 PROCEDURE — 1101F PR PT FALLS ASSESS DOC 0-1 FALLS W/OUT INJ PAST YR: ICD-10-PCS | Mod: HCNC,CPTII,95, | Performed by: FAMILY MEDICINE

## 2020-04-07 PROCEDURE — 99499 UNLISTED E&M SERVICE: CPT | Mod: HCNC,95,, | Performed by: FAMILY MEDICINE

## 2020-04-07 PROCEDURE — 3044F PR MOST RECENT HEMOGLOBIN A1C LEVEL <7.0%: ICD-10-PCS | Mod: HCNC,CPTII,95, | Performed by: FAMILY MEDICINE

## 2020-04-07 PROCEDURE — 3075F PR MOST RECENT SYSTOLIC BLOOD PRESS GE 130-139MM HG: ICD-10-PCS | Mod: HCNC,CPTII,95, | Performed by: FAMILY MEDICINE

## 2020-04-07 PROCEDURE — 3044F HG A1C LEVEL LT 7.0%: CPT | Mod: HCNC,CPTII,95, | Performed by: FAMILY MEDICINE

## 2020-04-07 PROCEDURE — 3078F PR MOST RECENT DIASTOLIC BLOOD PRESSURE < 80 MM HG: ICD-10-PCS | Mod: HCNC,CPTII,95, | Performed by: FAMILY MEDICINE

## 2020-04-07 PROCEDURE — 1101F PT FALLS ASSESS-DOCD LE1/YR: CPT | Mod: HCNC,CPTII,95, | Performed by: FAMILY MEDICINE

## 2020-04-07 PROCEDURE — 1159F MED LIST DOCD IN RCRD: CPT | Mod: HCNC,95,, | Performed by: FAMILY MEDICINE

## 2020-04-07 PROCEDURE — 3078F DIAST BP <80 MM HG: CPT | Mod: HCNC,CPTII,95, | Performed by: FAMILY MEDICINE

## 2020-04-07 PROCEDURE — 99499 RISK ADDL DX/OHS AUDIT: ICD-10-PCS | Mod: HCNC,95,, | Performed by: FAMILY MEDICINE

## 2020-04-07 PROCEDURE — 3075F SYST BP GE 130 - 139MM HG: CPT | Mod: HCNC,CPTII,95, | Performed by: FAMILY MEDICINE

## 2020-04-07 PROCEDURE — 99213 OFFICE O/P EST LOW 20 MIN: CPT | Mod: HCNC,95,, | Performed by: FAMILY MEDICINE

## 2020-04-07 RX ORDER — LORAZEPAM 0.5 MG/1
TABLET ORAL
Qty: 15 TABLET | Refills: 0 | Status: SHIPPED | OUTPATIENT
Start: 2020-04-07 | End: 2020-04-27 | Stop reason: SDUPTHER

## 2020-04-07 NOTE — PROGRESS NOTES
Subjective:       Patient ID: Cassandra Campos is a 69 y.o. female.    Chief Complaint: Anxiety    The patient location is: home  The chief complaint leading to consultation is: anxiety, panic  Visit type: Virtual visit with synchronous audio and video  Total time spent with patient: 2:36 -   Each patient to whom he or she provides medical services by telemedicine is:  (1) informed of the relationship between the physician and patient and the respective role of any other health care provider with respect to management of the patient; and (2) notified that he or she may decline to receive medical services by telemedicine and may withdraw from such care at any time.    Notes: she is anxious having panic attacks, walks to distract herself. Becomes restless. Last time this AM - 15 -30 min. Prays. She is worried about her son. She talked to  who suggested she see PCP.  She is afraid for herself also knowing the risks.  Had panic attacks when her son  number of years ago  .      Review of Systems   Constitutional: Negative for fatigue and fever.   Respiratory: Positive for shortness of breath (using albuterol during panic attacks also uses it prior to treadmill). Negative for chest tightness and wheezing.    Cardiovascular: Negative for chest pain and palpitations.   Gastrointestinal: Negative for constipation and diarrhea.        No heartburn on omeprazole   Musculoskeletal: Negative for arthralgias and back pain.   Neurological: Positive for headaches.   Psychiatric/Behavioral: Positive for sleep disturbance (on amitriptyline and eszopiclon).       Objective:      Physical Exam   Constitutional: She is oriented to person, place, and time. She appears well-developed and well-nourished.   HENT:   Head: Normocephalic and atraumatic.   Cardiovascular: Normal rate.   Pulmonary/Chest: Effort normal. No respiratory distress.   Neurological: She is alert and oriented to person, place, and time.   Psychiatric: She  has a normal mood and affect. Her behavior is normal.         Assessment/Plan:     1. Stress reaction  LORazepam (ATIVAN) 0.5 MG tablet   2. Controlled type 2 diabetes mellitus without complication, without long-term current use of insulin     3. Hyperlipidemia associated with type 2 diabetes mellitus  Lipid panel   4. Essential hypertension     5. Gastroesophageal reflux disease, esophagitis presence not specified     6. Panic attack as reaction to stress  LORazepam (ATIVAN) 0.5 MG tablet   7. Paroxysmal SVT (supraventricular tachycardia)      Okay for as needed lorazepam.  Continue to see  for counseling.  Information given including diaphragmatic breathing.  Not symptomatic for SVT.  BP elevated at visit.  Recheck 3 months with labs - lipids added to A1c, CMP already in chart.

## 2020-04-07 NOTE — Clinical Note
Schedule labs due today for July please (in orders file) (A1C, CMP) and the lipid ordered today also schedule for July - followed by a visit.

## 2020-04-07 NOTE — PATIENT INSTRUCTIONS
Breathing Techniques: Diaphragmatic Breathing  Diaphragmatic breathing or belly breathing helps you to breathe with your diaphragm. The diaphragm is a large muscle that plays an important part in breathing. It's located below your lungs. It separates your chest from your abdomen.  With a chronic lung disease, you may use your accessory muscles (a combination of muscles in your chest, shoulders, and neck) instead of your diaphragm. Using these muscles takes more effort and makes shortness of breath worse. Using your diaphragm makes breathing easier and allows you to take in more air.  Diaphragmatic breathing may help you:  · Be able to breathe easier  · Take in more air  · Relax  · Exercise or be more active  How to do diaphragmatic breathing      1. Lie on your back with a pillow under your head or sit in a comfortable chair. Make sure your back is supported.  2. Place one hand on your chest and one hand on your abdomen, the area over your stomach.  3. Breathe in slowly through your nose. Count to 2. As you inhale, your abdomen should move out against your hand. Your chest should stay still. .  4. Breathe outthrough your nose. Count to 4. As you breathe out, you should feel your stomach move in.  5. Notice that you breathe in to a count of 2 and that you breathe out to a count of 4. This helps you to keep your breathing slow and steady.  6. Practice this breathing technique for 5 to 10 minutes at first. Try to do it 3 to 4 times a day. Then increase the length of time and how often you practice it.  Date Last Reviewed: 2/1/2017  © 6200-6293 [employer]. [address] [place] [address], [place] [address]. All rights reserved. This information is not intended as a substitute for professional medical care. Always follow your healthcare professional's instructions.        Panic Attack  A panic attack is an extreme fear reaction that comes on for no clear reason. There is often a fear that something terrible will  happen or that you may die. The attack may last a few minutes up to a few hours. Between attacks, things will seem quite normal. This condition has a psychological cause and can be treated with the help of a therapist or psychiatrist. Medicine can be very helpful for this problem.  Panic attacks usually come on suddenly, reaches a peak within minutes, and includes at least 4 of these symptoms:  · Palpitations, pounding heart, or accelerated heart rate  · Sweating  · Crying  · Trembling or shaking  · Sensations of shortness of breath or smothering  · Feelings of choking  · Chest pain or discomfort  · Nausea or abdominal distress  · Feeling dizzy, unsteady, light-headed, or faint  · Numbness or tingling sensations  · Fear of dying  · Fear of going crazy or of losing control  · Feelings of unreality, strangeness, or detachment from the environment  Many of these symptoms can be linked to physical problems, so it is sometimes necessary to rule out conditions like thyroid disorders, heart disease, gastrointestinal problems, and others. They can also start as physical symptoms, but psychologically we may react to them in a fearful way, worsening the way we react and feel.  Home care  · Try to find the sources of stress in your life. They may not be obvious. These may include:  ¨ Daily hassles of life which pile up (traffic jams, missed appointments, car troubles, etc.).  ¨ Major life changes, both good (new baby, job promotion) and bad (loss of job, loss of loved one).  ¨ Feeling that you have too many responsibilities and can't take care of everything at once.  ¨ Helplessness: feeling like your problems are too much for you to handle.  · Notice how your body reacts to stress. Learn to listen to your body signals so that you can take action before the stress becomes severe.  · Try to be aware of what you were doing before the reaction started; this may give you clues to things that can trigger a reaction. It may be  situations in your life, or what you were doing at the time.  · When possible, avoid or reduce the cause of stress. Avoid hassles, limit the amount of change that is happening in your life at one time or take a break when you feel overloaded.  · Unfortunately, many stressful situations cannot be avoided. Therefore, it is necessary to learn how to manage stress better. There are many proven methods that work and will reduce your anxiety. These include simple things like exercise, good nutrition and adequate rest. Also, there are certain techniques that are helpful: relaxation and breathing exercises, visualization, biofeedback, meditation or simply taking some time-out to clear your mind. For more information about this, ask your doctor or go to a local bookstore and review the many books and tapes available on this subject.  Follow-up care  Follow-up with your healthcare provider, or as advised.  Call 911  Call 911 if any of these occur:  · Trouble breathing  · Confusion  · Very drowsy or trouble awakening  · Fainting or loss of consciousness  · Rapid heart rate  · Seizure  · New chest pain that becomes more severe, lasts longer, or spreads into your shoulder, arm, neck, jaw or back  When to seek medical advice  Call your healthcare provider right away if any of these occur:  · Worsening of your symptoms to the point of feeling out-of-control  · Increased pain with breathing  · Increasing feeling of weakness or dizziness  · Cough with dark colored sputum (phlegm) or blood  · Fever 1 degree above normal temperature ) lasting 24 to 48 hours or what your healthcare provider has advised  · Swelling, pain or redness in one leg  · Requests by family or friends for you to seek help for your symptoms  Date Last Reviewed: 9/29/2015  © 8505-0592 [employer]. [address] [place] [address], [place] [address]. All rights reserved. This information is not intended as a substitute for professional medical care. Always  follow your healthcare professional's instructions.

## 2020-04-27 ENCOUNTER — PATIENT MESSAGE (OUTPATIENT)
Dept: INTERNAL MEDICINE | Facility: CLINIC | Age: 70
End: 2020-04-27

## 2020-04-27 DIAGNOSIS — F43.0 PANIC ATTACK AS REACTION TO STRESS: ICD-10-CM

## 2020-04-27 DIAGNOSIS — F41.0 PANIC ATTACK AS REACTION TO STRESS: ICD-10-CM

## 2020-04-27 DIAGNOSIS — F43.0 STRESS REACTION: ICD-10-CM

## 2020-04-27 RX ORDER — LORAZEPAM 0.5 MG/1
TABLET ORAL
Qty: 25 TABLET | Refills: 0 | Status: SHIPPED | OUTPATIENT
Start: 2020-04-27 | End: 2020-05-25 | Stop reason: SDUPTHER

## 2020-04-29 ENCOUNTER — PATIENT MESSAGE (OUTPATIENT)
Dept: INTERNAL MEDICINE | Facility: CLINIC | Age: 70
End: 2020-04-29

## 2020-05-07 ENCOUNTER — PATIENT MESSAGE (OUTPATIENT)
Dept: OTHER | Facility: OTHER | Age: 70
End: 2020-05-07

## 2020-05-18 DIAGNOSIS — I15.9 SECONDARY HYPERTENSION: ICD-10-CM

## 2020-05-18 DIAGNOSIS — E11.9 DM II (DIABETES MELLITUS, TYPE II), CONTROLLED: ICD-10-CM

## 2020-05-18 DIAGNOSIS — E11.9 CONTROLLED TYPE 2 DIABETES MELLITUS WITHOUT COMPLICATION, WITHOUT LONG-TERM CURRENT USE OF INSULIN: ICD-10-CM

## 2020-05-18 RX ORDER — METOPROLOL SUCCINATE 50 MG/1
TABLET, EXTENDED RELEASE ORAL
Qty: 90 TABLET | Refills: 3 | Status: SHIPPED | OUTPATIENT
Start: 2020-05-18 | End: 2021-05-17

## 2020-05-18 RX ORDER — OMEPRAZOLE 20 MG/1
CAPSULE, DELAYED RELEASE ORAL
Qty: 90 CAPSULE | Refills: 1 | Status: SHIPPED | OUTPATIENT
Start: 2020-05-18 | End: 2020-11-30 | Stop reason: SDUPTHER

## 2020-05-19 RX ORDER — METFORMIN HYDROCHLORIDE 500 MG/1
TABLET, EXTENDED RELEASE ORAL
Qty: 180 TABLET | Refills: 0 | Status: SHIPPED | OUTPATIENT
Start: 2020-05-19 | End: 2020-09-02 | Stop reason: SDUPTHER

## 2020-05-19 RX ORDER — BLOOD SUGAR DIAGNOSTIC
STRIP MISCELLANEOUS
Qty: 100 STRIP | Refills: 3 | Status: SHIPPED | OUTPATIENT
Start: 2020-05-19 | End: 2021-03-22

## 2020-05-24 ENCOUNTER — PATIENT MESSAGE (OUTPATIENT)
Dept: INTERNAL MEDICINE | Facility: CLINIC | Age: 70
End: 2020-05-24

## 2020-05-24 DIAGNOSIS — F43.0 PANIC ATTACK AS REACTION TO STRESS: ICD-10-CM

## 2020-05-24 DIAGNOSIS — F43.0 STRESS REACTION: ICD-10-CM

## 2020-05-24 DIAGNOSIS — F41.0 PANIC ATTACK AS REACTION TO STRESS: ICD-10-CM

## 2020-05-25 RX ORDER — LORAZEPAM 0.5 MG/1
TABLET ORAL
Qty: 25 TABLET | Refills: 0 | Status: SHIPPED | OUTPATIENT
Start: 2020-05-25 | End: 2020-06-18 | Stop reason: SDUPTHER

## 2020-05-26 DIAGNOSIS — E11.9 CONTROLLED TYPE 2 DIABETES MELLITUS WITHOUT COMPLICATION, WITHOUT LONG-TERM CURRENT USE OF INSULIN: ICD-10-CM

## 2020-05-27 RX ORDER — METFORMIN HYDROCHLORIDE 500 MG/1
TABLET, EXTENDED RELEASE ORAL
Qty: 180 TABLET | Refills: 0 | OUTPATIENT
Start: 2020-05-27

## 2020-06-08 ENCOUNTER — TELEPHONE (OUTPATIENT)
Dept: OPHTHALMOLOGY | Facility: CLINIC | Age: 70
End: 2020-06-08

## 2020-06-08 NOTE — TELEPHONE ENCOUNTER
----- Message from Digna Macias sent at 6/8/2020  4:05 PM CDT -----  Contact: self [phone]  Type:  Patient Returning Call    Who Called: Cassandra Campos  Who Left Message for Patient: unk  Does the patient know what this is regarding?:appt  Would the patient rather a call back or a response via [facility]? Call back   Best Call Back Number:[phone]  Additional Information:

## 2020-06-15 ENCOUNTER — PATIENT OUTREACH (OUTPATIENT)
Dept: ADMINISTRATIVE | Facility: HOSPITAL | Age: 70
End: 2020-06-15

## 2020-06-15 NOTE — PROGRESS NOTES
[employer] report for pt. over due on HM. Pt. Is already scheduled for HA1c & Eye Exam on 07-.

## 2020-06-17 ENCOUNTER — PATIENT MESSAGE (OUTPATIENT)
Dept: INTERNAL MEDICINE | Facility: CLINIC | Age: 70
End: 2020-06-17

## 2020-06-17 DIAGNOSIS — F41.0 PANIC ATTACK AS REACTION TO STRESS: ICD-10-CM

## 2020-06-17 DIAGNOSIS — F43.0 PANIC ATTACK AS REACTION TO STRESS: ICD-10-CM

## 2020-06-17 DIAGNOSIS — F43.0 STRESS REACTION: ICD-10-CM

## 2020-06-18 RX ORDER — LORAZEPAM 0.5 MG/1
TABLET ORAL
Qty: 25 TABLET | Refills: 0 | Status: SHIPPED | OUTPATIENT
Start: 2020-06-18 | End: 2020-07-10 | Stop reason: SDUPTHER

## 2020-07-01 ENCOUNTER — TELEPHONE (OUTPATIENT)
Dept: INTERNAL MEDICINE | Facility: CLINIC | Age: 70
End: 2020-07-01

## 2020-07-01 DIAGNOSIS — G47.00 INSOMNIA, UNSPECIFIED TYPE: Primary | ICD-10-CM

## 2020-07-01 DIAGNOSIS — G47.00 INSOMNIA, UNSPECIFIED TYPE: ICD-10-CM

## 2020-07-01 RX ORDER — ESZOPICLONE 3 MG/1
TABLET, FILM COATED ORAL
Qty: 90 TABLET | Refills: 1 | Status: SHIPPED | OUTPATIENT
Start: 2020-07-01 | End: 2020-11-30 | Stop reason: SDUPTHER

## 2020-07-01 NOTE — TELEPHONE ENCOUNTER
Please advise- patient is requesting refill for Eszopiclone 3 mg. Last office visit was 04/07/2020

## 2020-07-01 NOTE — TELEPHONE ENCOUNTER
Please advise ----- Message from Adelaida Fabian sent at 7/1/2020  3:13 PM CDT -----  Contact: Afua with [employer] [facility] [phone]  Requesting an RX refill or new RX.  Is this a refill or new RX:  Refill  RX name and strength: Eszopiclone (LUNESTA) 3 mg Tab  Directions (copy/paste from chart): TAKE 1 TABLET EVERY NIGHT AS NEEDED FOR SLEEP   Is this a 30 day or 90 day RX:  7-9 days until mail order comes in  Local pharmacy or mail order pharmacy:  Local  Pharmacy name and phone # (copy/paste from chart):[facility]  [phone]     Comments:

## 2020-07-02 RX ORDER — ESZOPICLONE 3 MG/1
3 TABLET, FILM COATED ORAL NIGHTLY
Qty: 10 TABLET | Refills: 0 | Status: ON HOLD | OUTPATIENT
Start: 2020-07-02 | End: 2020-10-02 | Stop reason: SDUPTHER

## 2020-07-02 NOTE — TELEPHONE ENCOUNTER
April just wanted you to send an order for 1 weeks worth of medication to [facility] on [address] by Mo[address]  until her mail order comes in which will be 7 to 9 days.

## 2020-07-02 NOTE — TELEPHONE ENCOUNTER
This was requested as [employer] rx and submitted to [employer] this AM. Does pt want it sent locally instead?

## 2020-07-06 ENCOUNTER — PATIENT OUTREACH (OUTPATIENT)
Dept: ADMINISTRATIVE | Facility: OTHER | Age: 70
End: 2020-07-06

## 2020-07-06 ENCOUNTER — TELEPHONE (OUTPATIENT)
Dept: INTERNAL MEDICINE | Facility: CLINIC | Age: 70
End: 2020-07-06

## 2020-07-06 NOTE — PROGRESS NOTES
Requested updates within Care Everywhere.  Patient's chart was reviewed for overdue OPHELIA topics.  Immunizations reconciled.    Orders previously placed: hgb a1c  Eye exam scheduled 7/7/20.

## 2020-07-06 NOTE — TELEPHONE ENCOUNTER
----- Message from Lauren Zhang sent at 7/6/2020  1:46 PM CDT -----  Regarding: returning call  Contact: self [phone]  Patient is returning a phone call.  Who left a message for the patient:pablo padilla  Does patient know what this is regarding:    Comments:

## 2020-07-06 NOTE — TELEPHONE ENCOUNTER
Called pt to let pt know that her medications were approved and sent to her pharmacy. Pt stated that she has her medication already. Pt verbalized understanding.

## 2020-07-07 ENCOUNTER — OFFICE VISIT (OUTPATIENT)
Dept: OPHTHALMOLOGY | Facility: CLINIC | Age: 70
End: 2020-07-07
Payer: MEDICARE

## 2020-07-07 ENCOUNTER — HOSPITAL ENCOUNTER (OUTPATIENT)
Dept: RADIOLOGY | Facility: HOSPITAL | Age: 70
Discharge: HOME | End: 2020-07-07
Attending: INTERNAL MEDICINE
Payer: MEDICARE

## 2020-07-07 DIAGNOSIS — R91.1 LUNG NODULE: ICD-10-CM

## 2020-07-07 DIAGNOSIS — E11.9 TYPE 2 DIABETES MELLITUS WITHOUT RETINOPATHY: Primary | ICD-10-CM

## 2020-07-07 DIAGNOSIS — J98.11 ATELECTASIS OF RIGHT LUNG: ICD-10-CM

## 2020-07-07 PROCEDURE — 71250 CT THORAX DX C-: CPT | Mod: TC,HCNC

## 2020-07-07 PROCEDURE — 92014 COMPRE OPH EXAM EST PT 1/>: CPT | Mod: HCNC,S$GLB,, | Performed by: OPTOMETRIST

## 2020-07-07 PROCEDURE — 99999 PR PBB SHADOW E&M-EST. PATIENT-LVL III: CPT | Mod: PBBFAC,HCNC,, | Performed by: OPTOMETRIST

## 2020-07-07 PROCEDURE — 71250 CT THORAX DX C-: CPT | Mod: 26,HCNC,, | Performed by: RADIOLOGY

## 2020-07-07 PROCEDURE — 92014 PR EYE EXAM, EST PATIENT,COMPREHESV: ICD-10-PCS | Mod: HCNC,S$GLB,, | Performed by: OPTOMETRIST

## 2020-07-07 PROCEDURE — 99999 PR PBB SHADOW E&M-EST. PATIENT-LVL III: ICD-10-PCS | Mod: PBBFAC,HCNC,, | Performed by: OPTOMETRIST

## 2020-07-07 PROCEDURE — 71250 CT CHEST WITHOUT CONTRAST: ICD-10-PCS | Mod: 26,HCNC,, | Performed by: RADIOLOGY

## 2020-07-07 NOTE — PROGRESS NOTES
HPI     Diabetic Eye Exam     Comments: Yearly              Comments     NP to DNL  Last seen by [facility] on 12/20/18 for yearly eye exam  Diabetic eye exam  Diagnosed with diabetes in 2015  Recent vision fluctuations No  Lab Results       Component                Value               Date                       HGBA1C                   6.2 (H)             08/22/2019              HPI    Any vision changes since last exam: No   Eye pain: No  Other ocular symptoms: No    Do you wear currently wear glasses or contacts? PAL glasses     Interested in contacts today? No    Do you plan on getting new glasses today? No just updated glasses                Last edited by Nya Wilkinson, PCT on 7/7/2020 10:45 AM. (History)            Assessment /Plan     For exam results, see Encounter Report.    Type 2 diabetes mellitus without retinopathy      No diabetic retinopathy in either eye  Continue close care with PCP   Monitor 12 months    RTC 1 yr for dilated eye exam or PRN if any problems.   Discussed above and answered questions.

## 2020-07-10 DIAGNOSIS — F43.0 STRESS REACTION: ICD-10-CM

## 2020-07-10 DIAGNOSIS — F41.0 PANIC ATTACK AS REACTION TO STRESS: ICD-10-CM

## 2020-07-10 DIAGNOSIS — F43.0 PANIC ATTACK AS REACTION TO STRESS: ICD-10-CM

## 2020-07-10 NOTE — TELEPHONE ENCOUNTER
Dr. Maldonado i had an appointment(video) for 3PM today but had difficulty doing the call. we were to discuss my blood work myAIC is 6.2 also i need my rx for anxiety i also would like the rx for 1mg one time a day instead of 0.5 2 times a day .  Thanking you in advance  Ms Cassandra Campos  [phone] Cell#

## 2020-07-11 ENCOUNTER — PATIENT MESSAGE (OUTPATIENT)
Dept: INTERNAL MEDICINE | Facility: CLINIC | Age: 70
End: 2020-07-11

## 2020-07-11 RX ORDER — LORAZEPAM 0.5 MG/1
TABLET ORAL
Qty: 25 TABLET | Refills: 0 | Status: SHIPPED | OUTPATIENT
Start: 2020-07-11 | End: 2020-07-30 | Stop reason: SDUPTHER

## 2020-07-15 ENCOUNTER — PATIENT MESSAGE (OUTPATIENT)
Dept: INTERNAL MEDICINE | Facility: CLINIC | Age: 70
End: 2020-07-15

## 2020-07-15 ENCOUNTER — PATIENT MESSAGE (OUTPATIENT)
Dept: PULMONOLOGY | Facility: CLINIC | Age: 70
End: 2020-07-15

## 2020-07-15 ENCOUNTER — TELEPHONE (OUTPATIENT)
Dept: PULMONOLOGY | Facility: CLINIC | Age: 70
End: 2020-07-15

## 2020-07-15 NOTE — TELEPHONE ENCOUNTER
Pt asking questions about info sent to her from Internal Medicine.  Encouraged her to reach out to that dept for further explanation.

## 2020-07-15 NOTE — TELEPHONE ENCOUNTER
----- Message from Ana Romero sent at 7/15/2020  1:24 PM CDT -----  Regarding: NEED ORDERS TO GET COVID TEST AN APPOINTMENT W/PROVIDER  Contact: PATIENT  CALL PATIENT @ [phone]. THANKS

## 2020-07-30 ENCOUNTER — OFFICE VISIT (OUTPATIENT)
Dept: INTERNAL MEDICINE | Facility: CLINIC | Age: 70
End: 2020-07-30
Payer: MEDICARE

## 2020-07-30 VITALS — DIASTOLIC BLOOD PRESSURE: 70 MMHG | SYSTOLIC BLOOD PRESSURE: 115 MMHG

## 2020-07-30 DIAGNOSIS — G47.00 INSOMNIA, UNSPECIFIED TYPE: ICD-10-CM

## 2020-07-30 DIAGNOSIS — J84.10 CALCIFIED GRANULOMA OF LUNG: ICD-10-CM

## 2020-07-30 DIAGNOSIS — F41.0 PANIC ATTACK AS REACTION TO STRESS: ICD-10-CM

## 2020-07-30 DIAGNOSIS — F43.0 PANIC ATTACK AS REACTION TO STRESS: ICD-10-CM

## 2020-07-30 DIAGNOSIS — I10 ESSENTIAL HYPERTENSION: ICD-10-CM

## 2020-07-30 DIAGNOSIS — E11.9 CONTROLLED TYPE 2 DIABETES MELLITUS WITHOUT COMPLICATION, WITHOUT LONG-TERM CURRENT USE OF INSULIN: Primary | ICD-10-CM

## 2020-07-30 DIAGNOSIS — Z12.31 ENCOUNTER FOR SCREENING MAMMOGRAM FOR BREAST CANCER: ICD-10-CM

## 2020-07-30 DIAGNOSIS — E11.69 HYPERLIPIDEMIA ASSOCIATED WITH TYPE 2 DIABETES MELLITUS: ICD-10-CM

## 2020-07-30 DIAGNOSIS — Z78.0 ASYMPTOMATIC MENOPAUSAL STATE: ICD-10-CM

## 2020-07-30 DIAGNOSIS — E78.5 HYPERLIPIDEMIA ASSOCIATED WITH TYPE 2 DIABETES MELLITUS: ICD-10-CM

## 2020-07-30 PROCEDURE — 3078F DIAST BP <80 MM HG: CPT | Mod: HCNC,CPTII,95, | Performed by: FAMILY MEDICINE

## 2020-07-30 PROCEDURE — 3044F PR MOST RECENT HEMOGLOBIN A1C LEVEL <7.0%: ICD-10-PCS | Mod: HCNC,CPTII,95, | Performed by: FAMILY MEDICINE

## 2020-07-30 PROCEDURE — 1159F MED LIST DOCD IN RCRD: CPT | Mod: HCNC,95,, | Performed by: FAMILY MEDICINE

## 2020-07-30 PROCEDURE — 99214 OFFICE O/P EST MOD 30 MIN: CPT | Mod: HCNC,95,, | Performed by: FAMILY MEDICINE

## 2020-07-30 PROCEDURE — 1101F PR PT FALLS ASSESS DOC 0-1 FALLS W/OUT INJ PAST YR: ICD-10-PCS | Mod: HCNC,CPTII,95, | Performed by: FAMILY MEDICINE

## 2020-07-30 PROCEDURE — 3074F PR MOST RECENT SYSTOLIC BLOOD PRESSURE < 130 MM HG: ICD-10-PCS | Mod: HCNC,CPTII,95, | Performed by: FAMILY MEDICINE

## 2020-07-30 PROCEDURE — 1159F PR MEDICATION LIST DOCUMENTED IN MEDICAL RECORD: ICD-10-PCS | Mod: HCNC,95,, | Performed by: FAMILY MEDICINE

## 2020-07-30 PROCEDURE — 3074F SYST BP LT 130 MM HG: CPT | Mod: HCNC,CPTII,95, | Performed by: FAMILY MEDICINE

## 2020-07-30 PROCEDURE — 3078F PR MOST RECENT DIASTOLIC BLOOD PRESSURE < 80 MM HG: ICD-10-PCS | Mod: HCNC,CPTII,95, | Performed by: FAMILY MEDICINE

## 2020-07-30 PROCEDURE — 3044F HG A1C LEVEL LT 7.0%: CPT | Mod: HCNC,CPTII,95, | Performed by: FAMILY MEDICINE

## 2020-07-30 PROCEDURE — 99214 PR OFFICE/OUTPT VISIT, EST, LEVL IV, 30-39 MIN: ICD-10-PCS | Mod: HCNC,95,, | Performed by: FAMILY MEDICINE

## 2020-07-30 PROCEDURE — 1101F PT FALLS ASSESS-DOCD LE1/YR: CPT | Mod: HCNC,CPTII,95, | Performed by: FAMILY MEDICINE

## 2020-07-30 RX ORDER — LORAZEPAM 0.5 MG/1
0.5 TABLET ORAL 2 TIMES DAILY PRN
Qty: 25 TABLET | Refills: 0 | Status: SHIPPED | OUTPATIENT
Start: 2020-07-30 | End: 2020-08-21 | Stop reason: SDUPTHER

## 2020-07-30 NOTE — PROGRESS NOTES
Subjective:       Patient ID: Cassandra Campos is a 70 y.o. female.    Chief Complaint: COVID-19 Concerns    The patient location is: home /[place]  The chief complaint leading to consultation is: covid and recheck DM - recent labs.  Visit type: audiovisual  Total time spent with patient: 1:43 - 2:29  Each patient to whom he or she provides medical services by telemedicine is:  (1) informed of the relationship between the physician and patient and the respective role of any other health care provider with respect to management of the patient; and (2) notified that he or she may decline to receive medical services by telemedicine and may withdraw from such care at any time.    Notes:covid questions answered - given the [employer] website address and info.    Patient with type 2 diabetes, hypertension, hyperlipidemia here for scheduled recheck with recent labs.   Lab Results       Component                Value               Date                       WBC                      7.98                01/14/2020                 HGB                      13.4                01/14/2020                 HCT                      42.3                01/14/2020                 PLT                      297                 01/14/2020                 CHOL                     127                 07/07/2020                 TRIG                     135                 07/07/2020                 HDL                      32 (L)              07/07/2020                 LDLCALC                  68.0                07/07/2020                 ALT                      50 (H)              07/07/2020                 AST                      39                  07/07/2020                 NA                       141                 07/07/2020                 K                        4.2                 07/07/2020                 CL                       106                 07/07/2020                 CALCIUM                  9.4                 07/07/2020                  CREATININE               0.7                 07/07/2020                 BUN                      16                  07/07/2020                 CO2                      28                  07/07/2020                 TSH                      2.386               11/28/2016                 GLU                      108                 07/07/2020                 ESTGFRAFRICA             >60.0               07/07/2020                 EGFRNONAA                >60.0               07/07/2020                 HGBA1C                   6.3 (H)             07/07/2020                 MICALBCREAT              0.29                10/11/19            Lab Results       Component                Value               Date                       HGBA1C                   6.3 (H)             07/07/2020                 HGBA1C                   6.2 (H)             08/22/2019                 HGBA1C                   6.2 (H)             06/18/2019                 HGBA1C                   6.1 (H)             02/19/2019                 HGBA1C                   5.7 (H)             08/14/2018                 HGBA1C                   5.3                 02/12/2018            Diabetes Medications        metFORMIN (GLUCOPHAGE-XR) 500 MG XR 24hr tablet TAKE 2 TABLETS ONE TIME DAILY   Continued excellent control of diabetes on diet and metformin.      BP Readings from Last 3 Encounters:  04/07/20 : (!) 131/91  01/29/20 : 128/84  01/21/20 : (!) 153/96  Her last /90s she states - she is going to test now: 115/70 on her meter - visualized by me.  Hypertension Medications        metoprolol succinate (TOPROL-XL) 50 MG 24 hr tablet TAKE 1 TABLET EVERY DAY        Lab Results on pravastatin 40       Component                Value               Date                       CHOL                     127                 07/07/2020                 CHOL                     162                 08/22/2019                 CHOL                     132                  08/14/2018            Lab Results       Component                Value               Date                       HDL                      32 (L)              07/07/2020                 HDL                      38 (L)              08/22/2019                 HDL                      35 (L)              08/14/2018            Lab Results       Component                Value               Date                       LDLCALC                  68.0                07/07/2020                 LDLCALC                  62.4 (L)            08/22/2019                 LDLCALC                  61.0 (L)            08/14/2018            Lab Results       Component                Value               Date                       TRIG                     135                 07/07/2020                 TRIG                     308 (H)             08/22/2019                 TRIG                     180 (H)             08/14/2018                 She is still having panic - last time 10 days ago.        Diabetes  She has type 2 diabetes mellitus. No MedicAlert identification noted. The initial diagnosis of diabetes was made 12 years ago. Hypoglycemia symptoms include headaches and nervousness/anxiousness. Pertinent negatives for hypoglycemia include no confusion, dizziness, hunger, mood changes, pallor, seizures, sleepiness, speech difficulty, sweats or tremors. Pertinent negatives for diabetes include no blurred vision, no chest pain, no fatigue, no foot paresthesias, no foot ulcerations, no polydipsia, no polyphagia, no polyuria, no visual change, no weakness and no weight loss. Hypoglycemia complications include nocturnal hypoglycemia. Pertinent negatives for hypoglycemia complications include no blackouts, no hospitalization, no required assistance and no required glucagon injection. Symptoms are stable. Pertinent negatives for diabetic complications include no autonomic neuropathy, CVA, heart disease, impotence, nephropathy,  peripheral neuropathy, PVD or retinopathy. Risk factors for coronary artery disease include diabetes mellitus. Current diabetic treatment includes oral agent (monotherapy). She is compliant with treatment all of the time. Her weight is stable. She is following a generally healthy diet. Meal planning includes avoidance of concentrated sweets. She has not had a previous visit with a dietitian. She participates in exercise daily (60 minutes walking five days week). She monitors blood glucose at home 3-4 x per week. She monitors urine at home <1 x per month. Blood glucose monitoring compliance is excellent. Her home blood glucose trend is decreasing steadily. An ACE inhibitor/angiotensin II receptor blocker is contraindicated. She sees a podiatrist.Eye exam is current.     Review of Systems   Constitutional: Negative for fatigue and weight loss.   Eyes: Negative for blurred vision.   Cardiovascular: Negative for chest pain.   Endocrine: Negative for polydipsia, polyphagia and polyuria.   Genitourinary: Negative for impotence.   Skin: Negative for pallor.   Neurological: Positive for headaches. Negative for dizziness, tremors, seizures, speech difficulty and weakness.   Psychiatric/Behavioral: Negative for confusion. The patient is nervous/anxious.        Objective:      Physical Exam  Constitutional:       Appearance: Normal appearance. She is well-developed.   HENT:      Head: Normocephalic and atraumatic.   Pulmonary:      Effort: Pulmonary effort is normal. No respiratory distress.      Breath sounds: Normal breath sounds.   Neurological:      Mental Status: She is alert and oriented to person, place, and time.   Psychiatric:         Behavior: Behavior normal.           Assessment/Plan:     1. Controlled type 2 diabetes mellitus without complication, without long-term current use of insulin  Hemoglobin A1C   2. Calcified granuloma of lung     3. Panic attack as reaction to stress  DISCONTINUED: LORazepam (ATIVAN) 0.5  MG tablet   4. Hyperlipidemia associated with type 2 diabetes mellitus     5. Essential hypertension     6. Insomnia, unspecified type     7. Encounter for screening mammogram for breast cancer  Mammo Digital Screening Bilat w/ Ricardo   8. Asymptomatic menopausal state  DXA Bone Density Spine And Hip

## 2020-07-31 ENCOUNTER — TELEPHONE (OUTPATIENT)
Dept: PODIATRY | Facility: CLINIC | Age: 70
End: 2020-07-31

## 2020-07-31 NOTE — TELEPHONE ENCOUNTER
Spoke with pt she wanted to make a appt on January 14,2021. I informed the pt that the Dr will not be in clinic on that day I offered the day before January 13, or the day after January 15, pt stated that she will give the office a call back.        Tiny Guerra MA  Podiatry Surgical Department  [facility]                      ----- Message from Vahe Rizvi sent at 7/31/2020  1:57 PM CDT -----  Regarding: pt  .Type:  Patient Returning Call    Who Called:pt   Who Left Message for Patient:Tiny   Does the patient know what this is regarding?:n/a   Would the patient rather a call back or a response via [facility]? Call back   Best Call Back Number:.[phone]  Additional Information:       Thank You,   Vahe Rizvi

## 2020-07-31 NOTE — TELEPHONE ENCOUNTER
Contacted pt to see what time she wanted to make her appt for. Pt did not answer the phone I left a voice mail.          Tiny Guerra MA  Podiatry Surgical Department  [facility]

## 2020-08-21 ENCOUNTER — OFFICE VISIT (OUTPATIENT)
Dept: INTERNAL MEDICINE | Facility: CLINIC | Age: 70
End: 2020-08-21
Payer: MEDICARE

## 2020-08-21 VITALS
OXYGEN SATURATION: 95 % | HEIGHT: 63 IN | HEART RATE: 97 BPM | DIASTOLIC BLOOD PRESSURE: 86 MMHG | SYSTOLIC BLOOD PRESSURE: 138 MMHG | TEMPERATURE: 98 F | WEIGHT: 204.81 LBS | BODY MASS INDEX: 36.29 KG/M2

## 2020-08-21 DIAGNOSIS — R05.9 COUGH: Primary | ICD-10-CM

## 2020-08-21 DIAGNOSIS — Z86.69 HISTORY OF MIGRAINE: ICD-10-CM

## 2020-08-21 DIAGNOSIS — Z12.11 SCREENING FOR COLON CANCER: ICD-10-CM

## 2020-08-21 DIAGNOSIS — J45.20 MILD INTERMITTENT ASTHMA, UNSPECIFIED WHETHER COMPLICATED: ICD-10-CM

## 2020-08-21 DIAGNOSIS — R06.2 WHEEZING: ICD-10-CM

## 2020-08-21 DIAGNOSIS — J98.01 BRONCHOSPASM: ICD-10-CM

## 2020-08-21 DIAGNOSIS — F43.0 PANIC ATTACK AS REACTION TO STRESS: ICD-10-CM

## 2020-08-21 DIAGNOSIS — F41.0 PANIC ATTACK AS REACTION TO STRESS: ICD-10-CM

## 2020-08-21 LAB
MICROALBUM.,U,RANDOM: 12
MICROALBUMIN URINE: 12

## 2020-08-21 PROCEDURE — 3079F PR MOST RECENT DIASTOLIC BLOOD PRESSURE 80-89 MM HG: ICD-10-PCS | Mod: HCNC,CPTII,S$GLB, | Performed by: FAMILY MEDICINE

## 2020-08-21 PROCEDURE — 3008F BODY MASS INDEX DOCD: CPT | Mod: HCNC,CPTII,S$GLB, | Performed by: FAMILY MEDICINE

## 2020-08-21 PROCEDURE — 99214 PR OFFICE/OUTPT VISIT, EST, LEVL IV, 30-39 MIN: ICD-10-PCS | Mod: HCNC,S$GLB,, | Performed by: FAMILY MEDICINE

## 2020-08-21 PROCEDURE — 3008F PR BODY MASS INDEX (BMI) DOCUMENTED: ICD-10-PCS | Mod: HCNC,CPTII,S$GLB, | Performed by: FAMILY MEDICINE

## 2020-08-21 PROCEDURE — 99999 PR PBB SHADOW E&M-EST. PATIENT-LVL IV: CPT | Mod: PBBFAC,HCNC,, | Performed by: FAMILY MEDICINE

## 2020-08-21 PROCEDURE — 99214 OFFICE O/P EST MOD 30 MIN: CPT | Mod: HCNC,S$GLB,, | Performed by: FAMILY MEDICINE

## 2020-08-21 PROCEDURE — 1101F PR PT FALLS ASSESS DOC 0-1 FALLS W/OUT INJ PAST YR: ICD-10-PCS | Mod: HCNC,CPTII,S$GLB, | Performed by: FAMILY MEDICINE

## 2020-08-21 PROCEDURE — 1101F PT FALLS ASSESS-DOCD LE1/YR: CPT | Mod: HCNC,CPTII,S$GLB, | Performed by: FAMILY MEDICINE

## 2020-08-21 PROCEDURE — 3079F DIAST BP 80-89 MM HG: CPT | Mod: HCNC,CPTII,S$GLB, | Performed by: FAMILY MEDICINE

## 2020-08-21 PROCEDURE — 3075F SYST BP GE 130 - 139MM HG: CPT | Mod: HCNC,CPTII,S$GLB, | Performed by: FAMILY MEDICINE

## 2020-08-21 PROCEDURE — 1159F PR MEDICATION LIST DOCUMENTED IN MEDICAL RECORD: ICD-10-PCS | Mod: HCNC,S$GLB,, | Performed by: FAMILY MEDICINE

## 2020-08-21 PROCEDURE — 1126F PR PAIN SEVERITY QUANTIFIED, NO PAIN PRESENT: ICD-10-PCS | Mod: HCNC,S$GLB,, | Performed by: FAMILY MEDICINE

## 2020-08-21 PROCEDURE — 3075F PR MOST RECENT SYSTOLIC BLOOD PRESS GE 130-139MM HG: ICD-10-PCS | Mod: HCNC,CPTII,S$GLB, | Performed by: FAMILY MEDICINE

## 2020-08-21 PROCEDURE — 1126F AMNT PAIN NOTED NONE PRSNT: CPT | Mod: HCNC,S$GLB,, | Performed by: FAMILY MEDICINE

## 2020-08-21 PROCEDURE — 99999 PR PBB SHADOW E&M-EST. PATIENT-LVL IV: ICD-10-PCS | Mod: PBBFAC,HCNC,, | Performed by: FAMILY MEDICINE

## 2020-08-21 PROCEDURE — 1159F MED LIST DOCD IN RCRD: CPT | Mod: HCNC,S$GLB,, | Performed by: FAMILY MEDICINE

## 2020-08-21 RX ORDER — LORAZEPAM 0.5 MG/1
0.5 TABLET ORAL 2 TIMES DAILY PRN
Qty: 25 TABLET | Refills: 0 | Status: SHIPPED | OUTPATIENT
Start: 2020-08-21 | End: 2020-09-30 | Stop reason: SDUPTHER

## 2020-08-21 RX ORDER — PROMETHAZINE HYDROCHLORIDE AND DEXTROMETHORPHAN HYDROBROMIDE 6.25; 15 MG/5ML; MG/5ML
5 SYRUP ORAL NIGHTLY PRN
Qty: 180 ML | Refills: 0 | Status: ON HOLD | OUTPATIENT
Start: 2020-08-21 | End: 2020-10-02

## 2020-08-21 RX ORDER — AMITRIPTYLINE HYDROCHLORIDE 100 MG/1
100 TABLET ORAL NIGHTLY
Qty: 90 TABLET | Refills: 4 | Status: SHIPPED | OUTPATIENT
Start: 2020-08-21 | End: 2021-08-17 | Stop reason: SDUPTHER

## 2020-08-21 RX ORDER — ALBUTEROL SULFATE 90 UG/1
2 AEROSOL, METERED RESPIRATORY (INHALATION)
Qty: 18 G | Refills: 0 | Status: SHIPPED | OUTPATIENT
Start: 2020-08-21 | End: 2020-09-14

## 2020-08-21 NOTE — PROGRESS NOTES
Subjective:       Patient ID: Cassandra Campos is a 70 y.o. female.    Chief Complaint: Chest Congestion, Epistaxis, and Cough    She made the appt for pain with urination yesterday. Has not hurt since then. Is currently concerned aobut cough  She has had a cough for last 2 weeks - tickles her throat at night. Taking cough med from pharmacy q4 hours: Tussin for diabetics. Coughing up discolored sputum. No change to taste or smell. See ROS for other particulars. Wants cough med.        Cough  This is a new problem. The current episode started 1 to 4 weeks ago. The problem has been unchanged. The cough is productive of sputum. Associated symptoms include headaches (sl brief all-over HA - uses tylenol, it can resolve on its own after 20-30 min), nasal congestion and a sore throat. Pertinent negatives include no chest pain, chills, ear pain, fever, hemoptysis, postnasal drip, rhinorrhea, shortness of breath or sweats. Associated symptoms comments: Nose bleeding x 2 weeks. The symptoms are aggravated by lying down. She has tried OTC cough suppressant for the symptoms.     Review of Systems   Constitutional: Negative for chills and fever.   HENT: Positive for sore throat. Negative for ear pain, postnasal drip and rhinorrhea.    Respiratory: Positive for cough. Negative for hemoptysis and shortness of breath.    Cardiovascular: Negative for chest pain.   Neurological: Positive for headaches (sl brief all-over HA - uses tylenol, it can resolve on its own after 20-30 min).       Objective:      Physical Exam  Constitutional:       Appearance: She is well-developed.   HENT:      Head: Normocephalic and atraumatic.   Cardiovascular:      Rate and Rhythm: Normal rate and regular rhythm.      Heart sounds: Normal heart sounds.   Pulmonary:      Effort: Pulmonary effort is normal. No respiratory distress.      Breath sounds: Normal breath sounds.   Skin:     General: Skin is warm and dry.   Neurological:      Mental Status: She  is alert and oriented to person, place, and time.   Psychiatric:         Behavior: Behavior normal.           Assessment/Plan:     1. Cough  COVID-19 Routine Screening   2. Screening for colon cancer  Case request GI: COLONOSCOPY   3. Wheezing  albuterol (PROVENTIL/VENTOLIN HFA) 90 mcg/actuation inhaler   4. Mild intermittent asthma, unspecified whether complicated  albuterol (PROVENTIL/VENTOLIN HFA) 90 mcg/actuation inhaler   5. Bronchospasm  promethazine-dextromethorphan (PROMETHAZINE-DM) 6.25-15 mg/5 mL Syrp   6. History of migraine  amitriptyline (ELAVIL) 100 MG tablet   7. Panic attack as reaction to stress  LORazepam (ATIVAN) 0.5 MG tablet   check COVID 19 first. Advised to use albuterol TID for cough, OK for cough syrup.   She was sent urine collection kit for microalbumin from her [employer] - and is sending it in today.  Colonoscopy due October - refer.  Wants refill on her BZD. usijng up to BID - but intermittently. Has had more frequent anxiety even panic with COVID, other changes    She has lab and appt for Feb already sched for chronic diseases.    Using amitriptyline for sleep and migraine prophylaxis and wants refill.

## 2020-08-21 NOTE — PATIENT INSTRUCTIONS
Take your albuterol inhaler up to 3 x dialy for cough.    Instructions for Patients with Confirmed or Suspected COVID-19    If you are awaiting your test result, you will either be called or it will be released to the patient portal.  If you have any questions about your test, please visit [email or web address] or call our COVID-19 information line at [phone].      Instructions for non-hospitalized or discharged patients with confirmed or suspected COVID-19:       Stay home except to get medical care.    Separate yourself from other people and animals in your home.    Call ahead before visiting your doctor.    Wear a face mask.    Cover your coughs and sneezes.    Clean your hands often.    Avoid sharing personal household items.    Clean all high-touch surfaces every day.    Monitor your symptoms. Seek prompt medical attention if your illness is worsening (e.g., difficulty breathing). Before seeking care, call your healthcare provider.    If you have a medical emergency and must call 911, notify the dispatcher that you have or are being evaluated for COVID-19. If possible, put on a face mask before emergency medical services arrive.    Use the following symptom-based strategy to return to normal activity following a suspected or confirmed case of COVID-19. Continue isolation until:   o At least 3 days (72 hours) have passed since recovery defined as resolution of fever without the use of fever-reducing medications and improvement in respiratory symptoms (e.g. cough, shortness of breath), and   o At least 10 days have passed since the first positive test.       As one of the next steps, you will receive a call or text from the [place] Department of Health ([facility]) COVID-19 Tracing Team. See the contact information below so you know not to ignore the health departments call. It is important that you contact them back immediately so they can help.     Contact Tracer  Number:  [phone]  Caller ID for most carriers: [facility]    What is contact tracing?   Contact tracing is a process that helps identify everyone who has been in close contact with an infected person. Contact tracers let those people know they may have been exposed and guide them on next steps. Confidentiality is important for everyone; no one will be told who may have exposed them to the virus.   Your involvement is important. The more we know about where and how this virus is spreading, the better chance we have at stopping it from spreading further.  What does exposure mean?   Exposure means you have been within 6 feet for more than 15 minutes with a person who has or had COVID-19.  What kind of questions do the contact tracers ask?   A contact tracer will confirm your basic contact information including name, address, phone number, and next of kin, as well as asking about any symptoms you may have had. Theyll also ask you how you think you may have gotten sick, such as places where you may have been exposed to the virus, and people you were with. Those names will never be shared with anyone outside of that call, and will only be used to help trace and stop the spread of the virus.   I have privacy concerns. How will the state use my information?   Your privacy about your health is important. All calls are completed using call centers that use the appropriate health privacy protection measures (HIPAA compliance), meaning that your patient information is safe. No one will ever ask you any questions related to immigration status. Your health comes first.   Do I have to participate?   You do not have to participate, but we strongly encourage you to. Contact tracing can help us catch and control new outbreaks as theyre developing to keep your friends and family safe.   What if I dont hear from anyone?   If you dont receive a call within 24 hours, you can call the number above right away to  inquire about your status. That line is open from 8:00 am - 8:00 p.m., 7 days a week.  Contact tracing saves lives! Together, we have the power to beat this virus and keep our loved ones and neighbors safe.       Instructions for household members, intimate partners and caregivers in a non-healthcare setting of a patient with confirmed or suspected COVID-19:         Close contacts should monitor their health and call their healthcare provider right away if they develop symptoms suggestive of COVID-19 (e.g., fever, cough, shortness of breath).    Stay home except to get medical care. Separate yourself from other people and animals in the home.   Monitor the patients symptoms. If the patient is getting sicker, call his or her healthcare provider. If the patient has a medical emergency and you need to call 911, notify the dispatch personnel that the patient has or is being evaluated for COVID-19.    Wear a facemask when around other people such as sharing a room or vehicle and before entering a healthcare provider's office.   Cover coughs and sneezes with a tissue. Throw used tissues in a lined trash can immediately and wash hands.   Clean hands often with soap and water for at least 20 seconds or with an alcohol-based hand , rubbing hands together until they feel dry. Avoid touching your eyes, nose, and mouth with unwashed hands.   Clean all high-touch; surfaces every day, including counters, tabletops, doorknobs, bathroom fixtures, toilets, phones, keyboards, tablets, bedside tables, etc. Use a household cleaning spray or wipe according to label instructions.   Avoid sharing personal household items such as dishes, drinking glasses, cups, towels, bedding, etc. After these items are used, they should be washed thoroughly with soap and water.   Continue isolation until:   At least 3 days (72 hours) have passed since recovery defined as resolution of fever without the use of fever-reducing  medications and improvement in respiratory symptoms (e.g. cough, shortness of breath), and    At least 10 days have passed since the patients first positive test.    https://www.cdc.gov/coronavirus/2019-ncov/your-health/index.htm

## 2020-08-23 DIAGNOSIS — J98.01 BRONCHOSPASM: Primary | ICD-10-CM

## 2020-08-23 RX ORDER — METHYLPREDNISOLONE 4 MG/1
TABLET ORAL
Qty: 1 PACKAGE | Refills: 0 | Status: ON HOLD | OUTPATIENT
Start: 2020-08-23 | End: 2020-10-02 | Stop reason: ALTCHOICE

## 2020-09-02 ENCOUNTER — PATIENT MESSAGE (OUTPATIENT)
Dept: INTERNAL MEDICINE | Facility: CLINIC | Age: 70
End: 2020-09-02

## 2020-09-02 ENCOUNTER — NURSE TRIAGE (OUTPATIENT)
Dept: ADMINISTRATIVE | Facility: CLINIC | Age: 70
End: 2020-09-02

## 2020-09-02 DIAGNOSIS — E11.9 CONTROLLED TYPE 2 DIABETES MELLITUS WITHOUT COMPLICATION, WITHOUT LONG-TERM CURRENT USE OF INSULIN: ICD-10-CM

## 2020-09-02 RX ORDER — LANCETS
1 EACH MISCELLANEOUS DAILY
Qty: 100 EACH | Refills: 4 | Status: SHIPPED | OUTPATIENT
Start: 2020-09-02 | End: 2020-09-03 | Stop reason: SDUPTHER

## 2020-09-02 RX ORDER — METFORMIN HYDROCHLORIDE 500 MG/1
1000 TABLET, EXTENDED RELEASE ORAL DAILY
Qty: 180 TABLET | Refills: 3 | Status: SHIPPED | OUTPATIENT
Start: 2020-09-02 | End: 2020-09-02 | Stop reason: SDUPTHER

## 2020-09-02 RX ORDER — METFORMIN HYDROCHLORIDE 500 MG/1
1000 TABLET, EXTENDED RELEASE ORAL DAILY
Qty: 180 TABLET | Refills: 3 | Status: SHIPPED | OUTPATIENT
Start: 2020-09-02 | End: 2020-09-03 | Stop reason: SDUPTHER

## 2020-09-02 NOTE — TELEPHONE ENCOUNTER
Pt is out of medication: requesting orders for:     Metformin 500 mg  2 by mouth  daily.   accucheck softclix lancets- dispense 100.     [facility] Fax#[phone]    instructed I would send high alert message to MD office. Verbalizes understanding.     Reason for Disposition   Request for URGENT new prescription or refill of 'essential' medication (i.e., likelihood of harm to patient if not taken) and triager unable to fill per department policy    Protocols used: MEDICATION QUESTION CALL-A-[place]

## 2020-09-02 NOTE — TELEPHONE ENCOUNTER
pharmacy called back and has another request     Also can a Rx be sent to local pharmacy at the [facility].

## 2020-09-02 NOTE — TELEPHONE ENCOUNTER
Reason for Disposition   [1] Follow-up call to recent contact AND [2] information only call, no triage required    Protocols used: INFORMATION ONLY CALL-A-AH    [employer] Rep Jason  Called stated he just spoke with Melissa Rn and needed to speak with her again. Melissa notified and call transferred.

## 2020-09-04 RX ORDER — LANCETS
1 EACH MISCELLANEOUS DAILY
Qty: 100 EACH | Refills: 4 | Status: SHIPPED | OUTPATIENT
Start: 2020-09-04 | End: 2021-02-18

## 2020-09-04 RX ORDER — METFORMIN HYDROCHLORIDE 500 MG/1
1000 TABLET, EXTENDED RELEASE ORAL DAILY
Qty: 180 TABLET | Refills: 3 | Status: SHIPPED | OUTPATIENT
Start: 2020-09-04 | End: 2021-10-14 | Stop reason: SDUPTHER

## 2020-09-17 ENCOUNTER — PATIENT MESSAGE (OUTPATIENT)
Dept: INTERNAL MEDICINE | Facility: CLINIC | Age: 70
End: 2020-09-17

## 2020-09-18 ENCOUNTER — TELEPHONE (OUTPATIENT)
Dept: ADMINISTRATIVE | Facility: HOSPITAL | Age: 70
End: 2020-09-18

## 2020-09-28 ENCOUNTER — PATIENT MESSAGE (OUTPATIENT)
Dept: INTERNAL MEDICINE | Facility: CLINIC | Age: 70
End: 2020-09-28

## 2020-09-29 ENCOUNTER — PATIENT MESSAGE (OUTPATIENT)
Dept: INTERNAL MEDICINE | Facility: CLINIC | Age: 70
End: 2020-09-29

## 2020-09-29 ENCOUNTER — LAB VISIT (OUTPATIENT)
Dept: OTOLARYNGOLOGY | Facility: CLINIC | Age: 70
End: 2020-09-29
Payer: MEDICARE

## 2020-09-29 DIAGNOSIS — F43.0 PANIC ATTACK AS REACTION TO STRESS: ICD-10-CM

## 2020-09-29 DIAGNOSIS — F41.0 PANIC ATTACK AS REACTION TO STRESS: ICD-10-CM

## 2020-09-29 DIAGNOSIS — Z01.818 PRE-OP TESTING: Primary | ICD-10-CM

## 2020-09-29 PROCEDURE — U0003 INFECTIOUS AGENT DETECTION BY NUCLEIC ACID (DNA OR RNA); SEVERE ACUTE RESPIRATORY SYNDROME CORONAVIRUS 2 (SARS-COV-2) (CORONAVIRUS DISEASE [COVID-19]), AMPLIFIED PROBE TECHNIQUE, MAKING USE OF HIGH THROUGHPUT TECHNOLOGIES AS DESCRIBED BY CMS-2020-01-R: HCPCS | Mod: HCNC

## 2020-09-29 NOTE — TELEPHONE ENCOUNTER
Currently scheduled with [facility] for January - if she wants to get BMD by itself in November that is fine.

## 2020-09-30 ENCOUNTER — PATIENT MESSAGE (OUTPATIENT)
Dept: ENDOSCOPY | Facility: HOSPITAL | Age: 70
End: 2020-09-30

## 2020-09-30 ENCOUNTER — TELEPHONE (OUTPATIENT)
Dept: GASTROENTEROLOGY | Facility: CLINIC | Age: 70
End: 2020-09-30

## 2020-09-30 ENCOUNTER — PATIENT MESSAGE (OUTPATIENT)
Dept: INTERNAL MEDICINE | Facility: CLINIC | Age: 70
End: 2020-09-30

## 2020-09-30 LAB — SARS-COV-2 RNA RESP QL NAA+PROBE: NOT DETECTED

## 2020-09-30 RX ORDER — LORAZEPAM 0.5 MG/1
0.5 TABLET ORAL 2 TIMES DAILY PRN
Qty: 25 TABLET | Refills: 0 | Status: SHIPPED | OUTPATIENT
Start: 2020-09-30 | End: 2020-10-29 | Stop reason: SDUPTHER

## 2020-09-30 RX ORDER — SODIUM, POTASSIUM,MAG SULFATES 17.5-3.13G
1 SOLUTION, RECONSTITUTED, ORAL ORAL DAILY
Qty: 1 KIT | Refills: 0 | Status: ON HOLD | OUTPATIENT
Start: 2020-09-30 | End: 2020-10-02 | Stop reason: ALTCHOICE

## 2020-09-30 NOTE — TELEPHONE ENCOUNTER
Spoke with patient in regards to not receiving prep for colonoscopy scheduled on 10/02/2020. Prep order sent for approval before being sent to pharmacy. Patient verbalized understanding. Instructions sent through patient portal.

## 2020-10-01 ENCOUNTER — TELEPHONE (OUTPATIENT)
Dept: INTERNAL MEDICINE | Facility: CLINIC | Age: 70
End: 2020-10-01

## 2020-10-01 ENCOUNTER — PATIENT MESSAGE (OUTPATIENT)
Dept: INTERNAL MEDICINE | Facility: CLINIC | Age: 70
End: 2020-10-01

## 2020-10-02 ENCOUNTER — HOSPITAL ENCOUNTER (OUTPATIENT)
Facility: HOSPITAL | Age: 70
Discharge: HOME | End: 2020-10-02
Attending: SURGERY | Admitting: SURGERY
Payer: MEDICARE

## 2020-10-02 ENCOUNTER — ANESTHESIA (OUTPATIENT)
Dept: ENDOSCOPY | Facility: HOSPITAL | Age: 70
End: 2020-10-02
Payer: MEDICARE

## 2020-10-02 ENCOUNTER — ANESTHESIA EVENT (OUTPATIENT)
Dept: ENDOSCOPY | Facility: HOSPITAL | Age: 70
End: 2020-10-02
Payer: MEDICARE

## 2020-10-02 VITALS
TEMPERATURE: 98 F | OXYGEN SATURATION: 95 % | DIASTOLIC BLOOD PRESSURE: 70 MMHG | RESPIRATION RATE: 14 BRPM | SYSTOLIC BLOOD PRESSURE: 109 MMHG | HEART RATE: 101 BPM

## 2020-10-02 DIAGNOSIS — Z12.11 COLON CANCER SCREENING: ICD-10-CM

## 2020-10-02 PROCEDURE — 27201012 HC FORCEPS, HOT/COLD, DISP: Mod: HCNC | Performed by: SURGERY

## 2020-10-02 PROCEDURE — 88305 TISSUE EXAM BY PATHOLOGIST: ICD-10-PCS | Mod: 26,HCNC,, | Performed by: PATHOLOGY

## 2020-10-02 PROCEDURE — 45380 COLONOSCOPY AND BIOPSY: CPT | Mod: HCNC | Performed by: SURGERY

## 2020-10-02 PROCEDURE — 45380 COLONOSCOPY AND BIOPSY: CPT | Mod: PT,HCNC,, | Performed by: SURGERY

## 2020-10-02 PROCEDURE — 88305 TISSUE EXAM BY PATHOLOGIST: CPT | Mod: 26,HCNC,, | Performed by: PATHOLOGY

## 2020-10-02 PROCEDURE — 37000009 HC ANESTHESIA EA ADD 15 MINS: Mod: HCNC | Performed by: SURGERY

## 2020-10-02 PROCEDURE — 25000003 PHARM REV CODE 250: Mod: HCNC | Performed by: NURSE ANESTHETIST, CERTIFIED REGISTERED

## 2020-10-02 PROCEDURE — 45380 PR COLONOSCOPY,BIOPSY: ICD-10-PCS | Mod: PT,HCNC,, | Performed by: SURGERY

## 2020-10-02 PROCEDURE — 88305 TISSUE EXAM BY PATHOLOGIST: CPT | Mod: 59,HCNC | Performed by: PATHOLOGY

## 2020-10-02 PROCEDURE — 37000008 HC ANESTHESIA 1ST 15 MINUTES: Mod: HCNC | Performed by: SURGERY

## 2020-10-02 PROCEDURE — 63600175 PHARM REV CODE 636 W HCPCS: Mod: HCNC | Performed by: NURSE ANESTHETIST, CERTIFIED REGISTERED

## 2020-10-02 RX ORDER — SODIUM CHLORIDE 0.9 % (FLUSH) 0.9 %
3 SYRINGE (ML) INJECTION
Status: DISCONTINUED | OUTPATIENT
Start: 2020-10-02 | End: 2020-10-02 | Stop reason: HOSPADM

## 2020-10-02 RX ORDER — PROPOFOL 10 MG/ML
VIAL (ML) INTRAVENOUS
Status: DISCONTINUED | OUTPATIENT
Start: 2020-10-02 | End: 2020-10-02

## 2020-10-02 RX ORDER — SODIUM CHLORIDE, SODIUM LACTATE, POTASSIUM CHLORIDE, CALCIUM CHLORIDE 600; 310; 30; 20 MG/100ML; MG/100ML; MG/100ML; MG/100ML
INJECTION, SOLUTION INTRAVENOUS CONTINUOUS
Status: DISCONTINUED | OUTPATIENT
Start: 2020-10-02 | End: 2020-10-02 | Stop reason: HOSPADM

## 2020-10-02 RX ORDER — LIDOCAINE HYDROCHLORIDE 10 MG/ML
INJECTION, SOLUTION EPIDURAL; INFILTRATION; INTRACAUDAL; PERINEURAL
Status: DISCONTINUED | OUTPATIENT
Start: 2020-10-02 | End: 2020-10-02

## 2020-10-02 RX ORDER — SODIUM CHLORIDE, SODIUM LACTATE, POTASSIUM CHLORIDE, CALCIUM CHLORIDE 600; 310; 30; 20 MG/100ML; MG/100ML; MG/100ML; MG/100ML
INJECTION, SOLUTION INTRAVENOUS CONTINUOUS PRN
Status: DISCONTINUED | OUTPATIENT
Start: 2020-10-02 | End: 2020-10-02

## 2020-10-02 RX ADMIN — LIDOCAINE HYDROCHLORIDE 50 MG: 10 INJECTION, SOLUTION EPIDURAL; INFILTRATION; INTRACAUDAL; PERINEURAL at 02:10

## 2020-10-02 RX ADMIN — SODIUM CHLORIDE, SODIUM LACTATE, POTASSIUM CHLORIDE, AND CALCIUM CHLORIDE: 600; 310; 30; 20 INJECTION, SOLUTION INTRAVENOUS at 02:10

## 2020-10-02 RX ADMIN — PROPOFOL 20 MG: 10 INJECTION, EMULSION INTRAVENOUS at 02:10

## 2020-10-02 RX ADMIN — PROPOFOL 10 MG: 10 INJECTION, EMULSION INTRAVENOUS at 02:10

## 2020-10-02 RX ADMIN — PROPOFOL 80 MG: 10 INJECTION, EMULSION INTRAVENOUS at 02:10

## 2020-10-02 NOTE — ANESTHESIA POSTPROCEDURE EVALUATION
Anesthesia Post Evaluation    Patient: Casasndra Campos    Procedure(s) Performed: Procedure(s) (LRB):  COLONOSCOPY (N/A)    Final Anesthesia Type: MAC    Patient location during evaluation: PACU  Patient participation: Yes- Able to Participate  Level of consciousness: awake and alert  Post-procedure vital signs: reviewed and stable  Pain management: adequate  Airway patency: patent    PONV status at discharge: No PONV  Anesthetic complications: no      Cardiovascular status: blood pressure returned to baseline and hemodynamically stable  Respiratory status: unassisted, spontaneous ventilation and room air  Hydration status: euvolemic  Follow-up not needed.          Vitals Value Taken Time   /72 10/02/20 1443   Temp 36.7 °C (98 °F) 10/02/20 1443   Pulse 94 10/02/20 1443   Resp 15 10/02/20 1443   SpO2 93 % 10/02/20 1443         No case tracking events are documented in the log.      Pain/Francheska Score: Francheska Score: 9 (10/2/2020  2:43 PM)

## 2020-10-02 NOTE — [employer] PATIENT INSTRUCTIONS
Discharge Summary/Instructions after an Endoscopic Procedure  Patient Name: Cassandra Campos  Patient MRN: [id number]  Patient YOB: 1950 Friday, October 2, 2020 Tushar Edwards MD  RESTRICTIONS:  During your procedure today, you received medications for sedation.  These   medications may affect your judgment, balance and coordination.  Therefore,   for 24 hours, you have the following restrictions:   - DO NOT drive a car, operate machinery, make legal/financial decisions,   sign important papers or drink alcohol.    ACTIVITY:  Today: no heavy lifting, straining or running due to procedural   sedation/anesthesia.  The following day: return to full activity including work.  DIET:  Eat and drink normally unless instructed otherwise.     TREATMENT FOR COMMON SIDE EFFECTS:  - Mild abdominal pain, nausea, belching, bloating or excessive gas:  rest,   eat lightly and use a heating pad.  - Sore Throat: treat with throat lozenges and/or gargle with warm salt   water.  - Because air was used during the procedure, expelling large amounts of air   from your rectum or belching is normal.  - If a bowel prep was taken, you may not have a bowel movement for 1-3 days.    This is normal.  SYMPTOMS TO WATCH FOR AND REPORT TO YOUR PHYSICIAN:  1. Abdominal pain or bloating, other than gas cramps.  2. Chest pain.  3. Back pain.  4. Signs of infection such as: chills or fever occurring within 24 hours   after the procedure.  5. Rectal bleeding, which would show as bright red, maroon, or black stools.   (A tablespoon of blood from the rectum is not serious, especially if   hemorrhoids are present.)  6. Vomiting.  7. Weakness or dizziness.  GO DIRECTLY TO THE NEAREST EMERGENCY ROOM IF YOU HAVE ANY OF THE FOLLOWING:      Difficulty breathing              Chills and/or fever over 101 F   Persistent vomiting and/or vomiting blood   Severe abdominal pain   Severe chest pain   Black, tarry stools   Bleeding- more than one  tablespoon   Any other symptom or condition that you feel may need urgent attention  Your doctor recommends these additional instructions:  If any biopsies were taken, your doctors clinic will contact you in 1 to 2   weeks with any results.  - Patient has a contact number available for emergencies.  The signs and   symptoms of potential delayed complications were discussed with the   patient.  Return to normal activities tomorrow.  Written discharge   instructions were provided to the patient.   - Resume previous diet.   - Continue present medications.   - Await pathology results.   - Repeat colonoscopy in 5 years for surveillance.   - Return to referring physician as previously scheduled.   - Discharge patient to home.  For questions, problems or results please call your physician Tushar Edwards MD at Work:  [phone]  If you have any questions about the above instructions, call the GI   department at [phone] or call the endoscopy unit at [phone]   from 7am until 3 pm.  [facility], EMERGENCY ROOM PHONE NUMBER:   [phone]  IF A COMPLICATION OR EMERGENCY SITUATION ARISES AND YOU ARE UNABLE TO REACH   YOUR PHYSICIAN - GO DIRECTLY TO THE EMERGENCY ROOM.  I have read or have had read to me these discharge instructions for my   procedure and have received a written copy.  I understand these   instructions and will follow-up with my physician if I have any questions.     __________________________________       _____________________________________  Nurse Signature                                          Patient/Designated   Responsible Party Signature  Tushar Edwards MD  10/2/2020 2:42:55 PM  This report has been verified and signed electronically.  [employer]

## 2020-10-02 NOTE — PLAN OF CARE
D/C criteria met. D/C instructions given to pt and son. Demonstrated understanding by teach back method.

## 2020-10-02 NOTE — ANESTHESIA PREPROCEDURE EVALUATION
10/02/2020  Cassandra Campos is a 70 y.o., female.    Anesthesia Evaluation    I have reviewed the Patient Summary Reports.    I have reviewed the Nursing Notes. I have reviewed the NPO Status.   I have reviewed the Medications.     Review of Systems  Anesthesia Hx:  No problems with previous Anesthesia  Denies Family Hx of Anesthesia complications.   Denies Personal Hx of Anesthesia complications.   Social:  Non-Smoker    Hematology/Oncology:  Hematology Normal   Oncology Normal     EENT/Dental:EENT/Dental Normal   Cardiovascular:   Exercise tolerance: good Hypertension, well controlled ECG has been reviewed.    Pulmonary:   Sleep Apnea    Renal/:  Renal/ Normal     Hepatic/GI:   GERD, well controlled    Musculoskeletal:   Arthritis     Neurological:   Headaches    Endocrine:   Diabetes, well controlled, type 2    Dermatological:  Skin Normal    Psych:  Psychiatric Normal           Physical Exam  General:  Well nourished, Obesity    Airway/Jaw/Neck:  Airway Findings: Mouth Opening: Normal Tongue: Normal  General Airway Assessment: Adult  Mallampati: III  TM Distance: Normal, at least 6 cm  Jaw/Neck Findings:  Neck ROM: Normal ROM      Dental:  Dental Findings: In tact, Periodontal disease, MildPt denies loose teeth.   Chest/Lungs:  Chest/Lungs Findings: Clear to auscultation, Normal Respiratory Rate     Heart/Vascular:  Heart Findings: Rate: Normal  Rhythm: Regular Rhythm  Sounds: Normal             Anesthesia Plan  Type of Anesthesia, risks & benefits discussed:  Anesthesia Type:  MAC  Patient's Preference:   Intra-op Monitoring Plan: standard ASA monitors  Intra-op Monitoring Plan Comments:   Post Op Pain Control Plan: multimodal analgesia  Post Op Pain Control Plan Comments:   Induction:   IV  Beta Blocker:  Patient is on a Beta-Blocker and has received one dose within the past 24 hours (No further  documentation required).       Informed Consent: Patient understands risks and agrees with Anesthesia plan.  Questions answered. Anesthesia consent signed with patient.  ASA Score: 3     Day of Surgery Review of History & Physical: I have interviewed and examined the patient. I have reviewed the patient's H&P dated:  There are no significant changes.          Ready For Surgery From Anesthesia Perspective.

## 2020-10-02 NOTE — DISCHARGE SUMMARY
[facility]  Discharge Note  Short Stay    Procedure(s) (LRB):  COLONOSCOPY (N/A)    OUTCOME: Patient tolerated treatment/procedure well without complication and is now ready for discharge.    DISPOSITION: Home or Self Care    FINAL DIAGNOSIS:  Colon cancer screening    FOLLOWUP: None   None known

## 2020-10-02 NOTE — TRANSFER OF CARE
Anesthesia Transfer of Care Note    Patient: Cassandra Campos    Procedure(s) Performed: Procedure(s) (LRB):  COLONOSCOPY (N/A)    Patient location: PACU    Anesthesia Type: MAC    Transport from OR: Transported from OR on room air with adequate spontaneous ventilation    Post pain: adequate analgesia    Post assessment: no apparent anesthetic complications and tolerated procedure well    Post vital signs: stable    Level of consciousness: awake    Nausea/Vomiting: no nausea/vomiting    Complications: none    Transfer of care protocol was followed      Last vitals:   Visit Vitals  /72 (BP Location: Left arm, Patient Position: Lying)   Pulse 94   Temp 36.7 °C (98 °F) (Temporal)   Resp 15   SpO2 (!) 93%   Breastfeeding No

## 2020-10-02 NOTE — DISCHARGE INSTRUCTIONS

## 2020-10-02 NOTE — H&P
Endoscopy H&P    Procedure : Colonoscopy      asymptomatic screening exam and most recent endoscopic exam 10 years ago      Past Medical History:   Diagnosis Date    Arthritis     hands    Bilateral bunions     Borderline glaucoma     De Quervain's disease (radial styloid tenosynovitis)     Gastritis     upper GI 2017    Hydradenitis     Hyperlipidemia     Hypertension     Insomnia     Migraines 2000    Nasal septum perforation     Obesity     Pneumonia     Restrictive airway disease     Sleep apnea     SVT (supraventricular tachycardia) 2013    Trigger finger     Type 2 diabetes mellitus        Past Surgical History:   Procedure Laterality Date    AXILLARY HIDRADENITIS EXCISION      BONE EXOSTOSIS EXCISION Right 2018    Procedure: EXCISION, EXOSTOSIS;  Surgeon: Jayro Pedraza Sr., MD;  Location: [facility] OR;  Service: Orthopedics;  Laterality: Right;    BREAST BIOPSY Bilateral     BREAST LUMPECTOMY Bilateral 1998    Benign    CARPAL TUNNEL RELEASE      bilateral    CATARACT EXTRACTION Bilateral     OU     SECTION  1979    CHOLECYSTECTOMY  2014    CYST REMOVAL  2015    sebaceous cyst removed from face    DE QUERVAIN'S RELEASE Left 2020    Procedure: RELEASE, HAND, FOR DEQUERVAIN'S TENOSYNOVITIS;  Surgeon: Jaime Oswald MD;  Location: [facility] OR;  Service: Orthopedics;  Laterality: Left;    gastric sleeve  2017    Dr. Watson    KNEE SURGERY Right     OLECRANON BURSECTOMY Right 2018    Procedure: BURSECTOMY, OLECRANON;  Surgeon: Jayro Pedraza Sr., MD;  Location: [facility];  Service: Orthopedics;  Laterality: Right;    SURGICAL REMOVAL OF BUNION WITH OSTEOTOMY OF METATARSAL BONE Left 5/10/2019    Procedure: BUNIONECTOMY, WITH METATARSAL OSTEOTOMY;  Surgeon: Srinivasan Villanueva DPM;  Location: [facility] OR;  Service: Podiatry;  Laterality: Left;    SURGICAL REMOVAL OF BUNION WITH OSTEOTOMY OF METATARSAL BONE Right 2019    Procedure:  BUNIONECTOMY, WITH METATARSAL OSTEOTOMY;  Surgeon: Srinivasan Villanueva DPM;  Location: [facility];  Service: Podiatry;  Laterality: Right;    TONSILLECTOMY, ADENOIDECTOMY  1980s    TRIGGER FINGER RELEASE Right april 1, 2015    Dr. Pedraza     No current facility-administered medications on file prior to encounter.      Current Outpatient Medications on File Prior to Encounter   Medication Sig Dispense Refill    amitriptyline (ELAVIL) 100 MG tablet Take 1 tablet (100 mg total) by mouth nightly. 90 tablet 4    eszopiclone (LUNESTA) 3 mg Tab TAKE 1 TABLET EVERY NIGHT AS NEEDED FOR SLEEP 90 tablet 1    metoprolol succinate (TOPROL-XL) 50 MG 24 hr tablet TAKE 1 TABLET EVERY DAY 90 tablet 3    omeprazole (PRILOSEC) 20 MG capsule TAKE 1 CAPSULE EVERY DAY 90 capsule 1    pravastatin (PRAVACHOL) 40 MG tablet TAKE 1 TABLET BY MOUTH EVERY DAY AT NIGHT 90 tablet 4    ACCU-CHEK FASTCLIX Misc USE ONE TIME DAILY 100 each 3    ACCU-CHEK SMARTVIEW TEST STRIP Strp TEST ONE TIME DAILY 100 strip 3    blood-glucose meter kit Use as instructed 1 each 0    [DISCONTINUED] eszopiclone (LUNESTA) 3 mg Tab Take 1 tablet (3 mg total) by mouth every evening. (Patient not taking: Reported on 8/21/2020) 10 tablet 0    [DISCONTINUED] promethazine-dextromethorphan (PROMETHAZINE-DM) 6.25-15 mg/5 mL Syrp Take 5 mLs by mouth nightly as needed (Cough). 180 mL 0     Review of patient's allergies indicates:  No Known Allergies        Review of Systems -ROS:  GENERAL: No fever, chills, fatigability or weight loss.  CHEST: Denies BOGGS, cyanosis, wheezing, cough and sputum production.  CARDIOVASCULAR: Denies chest pain, PND, orthopnea or reduced exercise tolerance.   Musculoskeletal ROS: negative for - gait disturbance or joint pain  Neurological ROS: negative for - confusion or memory loss        Physical Exam:  General: well developed, well nourished, no distress  Head: normocephalic  Neck: supple, symmetrical, trachea midline  Lungs:  clear to  auscultation bilaterally and normal respiratory effort  Heart: regular rate and rhythm, S1, S2 normal, no murmur, rub or gallop and regular rate and rhythm  Abdomen: soft, non-tender non-distented; bowel sounds normal; no masses,  no organomegaly  Extremities: no cyanosis or edema, or clubbing       Moderate Sedation (choice): Mallampati Score 1    ASA : II    IMP: asymptomatic screening exam and most recent endoscopic exam 10 years ago    Plan: Colonoscopy with Moderate sedation.  I have explained the procedure including indications, alternatives, expected outcomes and potential complications. The patient appears to understand and gives informed consent. The patient is medically ready for surgery.

## 2020-10-05 ENCOUNTER — PATIENT OUTREACH (OUTPATIENT)
Dept: ADMINISTRATIVE | Facility: HOSPITAL | Age: 70
End: 2020-10-05

## 2020-10-07 ENCOUNTER — PATIENT MESSAGE (OUTPATIENT)
Dept: INTERNAL MEDICINE | Facility: CLINIC | Age: 70
End: 2020-10-07

## 2020-10-09 LAB
FINAL PATHOLOGIC DIAGNOSIS: NORMAL
GROSS: NORMAL
Lab: NORMAL

## 2020-10-11 ENCOUNTER — PATIENT MESSAGE (OUTPATIENT)
Dept: SURGERY | Facility: CLINIC | Age: 70
End: 2020-10-11

## 2020-10-27 ENCOUNTER — TELEPHONE (OUTPATIENT)
Dept: ORTHOPEDICS | Facility: CLINIC | Age: 70
End: 2020-10-27

## 2020-10-28 ENCOUNTER — PATIENT MESSAGE (OUTPATIENT)
Dept: INTERNAL MEDICINE | Facility: CLINIC | Age: 70
End: 2020-10-28

## 2020-10-28 DIAGNOSIS — F41.0 PANIC ATTACK AS REACTION TO STRESS: ICD-10-CM

## 2020-10-28 DIAGNOSIS — F43.0 PANIC ATTACK AS REACTION TO STRESS: ICD-10-CM

## 2020-10-29 RX ORDER — LORAZEPAM 0.5 MG/1
0.5 TABLET ORAL 2 TIMES DAILY PRN
Qty: 25 TABLET | Refills: 0 | Status: SHIPPED | OUTPATIENT
Start: 2020-10-29 | End: 2020-12-02

## 2020-10-30 ENCOUNTER — PATIENT MESSAGE (OUTPATIENT)
Dept: ORTHOPEDICS | Facility: CLINIC | Age: 70
End: 2020-10-30

## 2020-11-10 ENCOUNTER — TELEPHONE (OUTPATIENT)
Dept: UROLOGY | Facility: CLINIC | Age: 70
End: 2020-11-10

## 2020-11-10 ENCOUNTER — OFFICE VISIT (OUTPATIENT)
Dept: ORTHOPEDICS | Facility: CLINIC | Age: 70
End: 2020-11-10
Payer: MEDICARE

## 2020-11-10 ENCOUNTER — LAB VISIT (OUTPATIENT)
Dept: LAB | Facility: HOSPITAL | Age: 70
End: 2020-11-10
Attending: ORTHOPAEDIC SURGERY
Payer: MEDICARE

## 2020-11-10 VITALS
SYSTOLIC BLOOD PRESSURE: 143 MMHG | HEIGHT: 63 IN | HEART RATE: 105 BPM | WEIGHT: 204 LBS | BODY MASS INDEX: 36.14 KG/M2 | DIASTOLIC BLOOD PRESSURE: 88 MMHG

## 2020-11-10 DIAGNOSIS — Z01.818 PREOP TESTING: ICD-10-CM

## 2020-11-10 DIAGNOSIS — M65.4 DE QUERVAIN'S DISEASE (RADIAL STYLOID TENOSYNOVITIS): Primary | ICD-10-CM

## 2020-11-10 LAB
ALBUMIN SERPL BCP-MCNC: 3.9 G/DL (ref 3.5–5.2)
ALP SERPL-CCNC: 116 U/L (ref 55–135)
ALT SERPL W/O P-5'-P-CCNC: 70 U/L (ref 10–44)
ANION GAP SERPL CALC-SCNC: 8 MMOL/L (ref 8–16)
AST SERPL-CCNC: 65 U/L (ref 10–40)
BILIRUB SERPL-MCNC: 0.4 MG/DL (ref 0.1–1)
BUN SERPL-MCNC: 11 MG/DL (ref 8–23)
CALCIUM SERPL-MCNC: 10.2 MG/DL (ref 8.7–10.5)
CHLORIDE SERPL-SCNC: 104 MMOL/L (ref 95–110)
CO2 SERPL-SCNC: 29 MMOL/L (ref 23–29)
CREAT SERPL-MCNC: 0.7 MG/DL (ref 0.5–1.4)
EST. GFR  (AFRICAN AMERICAN): >60 ML/MIN/1.73 M^2
EST. GFR  (NON AFRICAN AMERICAN): >60 ML/MIN/1.73 M^2
GLUCOSE SERPL-MCNC: 135 MG/DL (ref 70–110)
POTASSIUM SERPL-SCNC: 4.1 MMOL/L (ref 3.5–5.1)
PROT SERPL-MCNC: 8.7 G/DL (ref 6–8.4)
SODIUM SERPL-SCNC: 141 MMOL/L (ref 136–145)

## 2020-11-10 PROCEDURE — 3077F PR MOST RECENT SYSTOLIC BLOOD PRESSURE >= 140 MM HG: ICD-10-PCS | Mod: CPTII,S$GLB,, | Performed by: ORTHOPAEDIC SURGERY

## 2020-11-10 PROCEDURE — 1101F PT FALLS ASSESS-DOCD LE1/YR: CPT | Mod: CPTII,S$GLB,, | Performed by: ORTHOPAEDIC SURGERY

## 2020-11-10 PROCEDURE — 1159F MED LIST DOCD IN RCRD: CPT | Mod: S$GLB,,, | Performed by: ORTHOPAEDIC SURGERY

## 2020-11-10 PROCEDURE — 1125F PR PAIN SEVERITY QUANTIFIED, PAIN PRESENT: ICD-10-PCS | Mod: S$GLB,,, | Performed by: ORTHOPAEDIC SURGERY

## 2020-11-10 PROCEDURE — 3079F PR MOST RECENT DIASTOLIC BLOOD PRESSURE 80-89 MM HG: ICD-10-PCS | Mod: CPTII,S$GLB,, | Performed by: ORTHOPAEDIC SURGERY

## 2020-11-10 PROCEDURE — 80053 COMPREHEN METABOLIC PANEL: CPT

## 2020-11-10 PROCEDURE — 99213 PR OFFICE/OUTPT VISIT, EST, LEVL III, 20-29 MIN: ICD-10-PCS | Mod: S$GLB,,, | Performed by: ORTHOPAEDIC SURGERY

## 2020-11-10 PROCEDURE — 99999 PR PBB SHADOW E&M-EST. PATIENT-LVL III: ICD-10-PCS | Mod: PBBFAC,HCNC,, | Performed by: ORTHOPAEDIC SURGERY

## 2020-11-10 PROCEDURE — 1101F PR PT FALLS ASSESS DOC 0-1 FALLS W/OUT INJ PAST YR: ICD-10-PCS | Mod: CPTII,S$GLB,, | Performed by: ORTHOPAEDIC SURGERY

## 2020-11-10 PROCEDURE — 3008F BODY MASS INDEX DOCD: CPT | Mod: CPTII,S$GLB,, | Performed by: ORTHOPAEDIC SURGERY

## 2020-11-10 PROCEDURE — 99213 OFFICE O/P EST LOW 20 MIN: CPT | Mod: S$GLB,,, | Performed by: ORTHOPAEDIC SURGERY

## 2020-11-10 PROCEDURE — 3008F PR BODY MASS INDEX (BMI) DOCUMENTED: ICD-10-PCS | Mod: CPTII,S$GLB,, | Performed by: ORTHOPAEDIC SURGERY

## 2020-11-10 PROCEDURE — 36415 COLL VENOUS BLD VENIPUNCTURE: CPT | Mod: HCNC

## 2020-11-10 PROCEDURE — 3079F DIAST BP 80-89 MM HG: CPT | Mod: CPTII,S$GLB,, | Performed by: ORTHOPAEDIC SURGERY

## 2020-11-10 PROCEDURE — 1159F PR MEDICATION LIST DOCUMENTED IN MEDICAL RECORD: ICD-10-PCS | Mod: S$GLB,,, | Performed by: ORTHOPAEDIC SURGERY

## 2020-11-10 PROCEDURE — 99999 PR PBB SHADOW E&M-EST. PATIENT-LVL III: CPT | Mod: PBBFAC,HCNC,, | Performed by: ORTHOPAEDIC SURGERY

## 2020-11-10 PROCEDURE — 85025 COMPLETE CBC W/AUTO DIFF WBC: CPT

## 2020-11-10 PROCEDURE — 1125F AMNT PAIN NOTED PAIN PRSNT: CPT | Mod: S$GLB,,, | Performed by: ORTHOPAEDIC SURGERY

## 2020-11-10 PROCEDURE — 3077F SYST BP >= 140 MM HG: CPT | Mod: CPTII,S$GLB,, | Performed by: ORTHOPAEDIC SURGERY

## 2020-11-10 RX ORDER — HYDROCODONE BITARTRATE AND ACETAMINOPHEN 5; 325 MG/1; MG/1
1 TABLET ORAL EVERY 6 HOURS PRN
Qty: 28 TABLET | Refills: 0 | Status: SHIPPED | OUTPATIENT
Start: 2020-11-10 | End: 2020-11-10

## 2020-11-10 RX ORDER — HYDROCODONE BITARTRATE AND ACETAMINOPHEN 5; 325 MG/1; MG/1
1 TABLET ORAL EVERY 6 HOURS PRN
Qty: 28 TABLET | Refills: 0 | Status: SHIPPED | OUTPATIENT
Start: 2020-11-10 | End: 2020-11-25

## 2020-11-10 NOTE — TELEPHONE ENCOUNTER
Pts Rx for [place] was sent to [facility]; please resend to [facility] [place]; pt is waiting.  Thanks

## 2020-11-10 NOTE — PROGRESS NOTES
The patient is a 70-year-old female with right de Quervain tendonitis.  She had left de Quervain release and is quite pleased with that.  She wishes to have a right de Quervain release.  She has not responded to splinting or injection.  Subjective:     Patient ID: Cassandra Campos is a 70 y.o. female.    Chief Complaint: Pain of the Right Wrist    The patient is a 70-year-old female with right de Quervain tendonitis that is not responded to injection or splinting.  She is pleased the results were left de Quervain release and wishes to have a right de Quervain release      Past Medical History:   Diagnosis Date    Arthritis     hands    Bilateral bunions     Borderline glaucoma     De Quervain's disease (radial styloid tenosynovitis)     Gastritis     upper GI 2017    Hydradenitis     Hyperlipidemia     Hypertension     Insomnia     Migraines 2000    Nasal septum perforation     Obesity     Pneumonia     Restrictive airway disease     Sleep apnea     SVT (supraventricular tachycardia) 2013    Trigger finger     Type 2 diabetes mellitus      Past Surgical History:   Procedure Laterality Date    AXILLARY HIDRADENITIS EXCISION      BONE EXOSTOSIS EXCISION Right 2018    Procedure: EXCISION, EXOSTOSIS;  Surgeon: Jayro Pedraza Sr., MD;  Location: [facility] OR;  Service: Orthopedics;  Laterality: Right;    BREAST BIOPSY Bilateral     BREAST LUMPECTOMY Bilateral 1998    Benign    CARPAL TUNNEL RELEASE      bilateral    CATARACT EXTRACTION Bilateral     OU     SECTION  1979    CHOLECYSTECTOMY  2014    COLONOSCOPY N/A 10/2/2020    Procedure: COLONOSCOPY;  Surgeon: Tushar Edwards MD;  Location: [facility];  Service: Endoscopy;  Laterality: N/A;    COLONOSCOPY W/ POLYPECTOMY  10/02/2020    Polyps x3, repeat 5 years; Tushar Edwards MD     CYST REMOVAL  2015    sebaceous cyst removed from face    DE QUERVAIN'S RELEASE Left 2020    Procedure: RELEASE, HAND, FOR  DEQUERVAIN'S TENOSYNOVITIS;  Surgeon: Jaime Oswald MD;  Location: [facility] OR;  Service: Orthopedics;  Laterality: Left;    gastric sleeve  2017    Dr. Watson    KNEE SURGERY Right     OLECRANON BURSECTOMY Right 2018    Procedure: BURSECTOMY, OLECRANON;  Surgeon: Jayro Pedraza Sr., MD;  Location: [facility];  Service: Orthopedics;  Laterality: Right;    SURGICAL REMOVAL OF BUNION WITH OSTEOTOMY OF METATARSAL BONE Left 5/10/2019    Procedure: BUNIONECTOMY, WITH METATARSAL OSTEOTOMY;  Surgeon: Srinivasan Villanueva DPM;  Location: [facility] OR;  Service: Podiatry;  Laterality: Left;    SURGICAL REMOVAL OF BUNION WITH OSTEOTOMY OF METATARSAL BONE Right 2019    Procedure: BUNIONECTOMY, WITH METATARSAL OSTEOTOMY;  Surgeon: Srinivasan Villanueva DPM;  Location: [facility];  Service: Podiatry;  Laterality: Right;    TONSILLECTOMY, ADENOIDECTOMY  1980s    TRIGGER FINGER RELEASE Right 2015    Dr. Pedraza     Family History   Problem Relation Age of Onset    Prostate cancer Brother     Diabetes Maternal Aunt      Social History     Socioeconomic History    Marital status:      Spouse name: Not on file    Number of children: 1    Years of education: Not on file    Highest education level: Not on file   Occupational History    Occupation:  aid   Social Needs    Financial resource strain: Not very hard    Food insecurity     Worry: Sometimes true     Inability: Sometimes true    Transportation needs     Medical: No     Non-medical: No   Tobacco Use    Smoking status: Former Smoker     Packs/day: 0.50     Years: 30.00     Pack years: 15.00     Types: Cigarettes     Start date: 1970     Quit date: 2012     Years since quittin.8    Smokeless tobacco: Never Used   Substance and Sexual Activity    Alcohol use: Yes     Frequency: Never     Drinks per session: 1 or 2     Binge frequency: Never     Comment: rarely // No alcohol 72 HOURS prior to surgery    Drug use: No     Sexual activity: Not Currently     Partners: Male   Lifestyle    Physical activity     Days per week: 5 days     Minutes per session: 90 min    Stress: To some extent   Relationships    Social connections     Talks on phone: More than three times a week     Gets together: Once a week     Attends Taoist service: More than 4 times per year     Active member of club or organization: Yes     Attends meetings of clubs or organizations: More than 4 times per year     Relationship status:    Other Topics Concern    Not on file   Social History Narrative    Single, part-time teacher. Masters degree biology.      Medication List with Changes/Refills   Current Medications    ACCU-CHEK FASTCLIX MISC    USE ONE TIME DAILY    ACCU-CHEK SMARTVIEW TEST STRIP STRP    TEST ONE TIME DAILY    ALBUTEROL (PROVENTIL/VENTOLIN HFA) 90 MCG/ACTUATION INHALER    INHALE 2 PUFFS INTO THE LUNGS EVERY 4 TO 6 HOURS AS NEEDED FOR WHEEZING OR SHORTNESS OF BREATH.    AMITRIPTYLINE (ELAVIL) 100 MG TABLET    Take 1 tablet (100 mg total) by mouth nightly.    BLOOD-GLUCOSE METER KIT    Use as instructed    ESZOPICLONE (LUNESTA) 3 MG TAB    TAKE 1 TABLET EVERY NIGHT AS NEEDED FOR SLEEP    LANCETS MISC    1 each by Misc.(Non-Drug; Combo Route) route once daily.    LORAZEPAM (ATIVAN) 0.5 MG TABLET    Take 1 tablet (0.5 mg total) by mouth 2 (two) times daily as needed for Anxiety.    METFORMIN (GLUCOPHAGE-XR) 500 MG ER 24HR TABLET    Take 2 tablets (1,000 mg total) by mouth once daily.    METOPROLOL SUCCINATE (TOPROL-XL) 50 MG 24 HR TABLET    TAKE 1 TABLET EVERY DAY    OMEPRAZOLE (PRILOSEC) 20 MG CAPSULE    TAKE 1 CAPSULE EVERY DAY    PRAVASTATIN (PRAVACHOL) 40 MG TABLET    TAKE 1 TABLET BY MOUTH EVERY DAY AT NIGHT     Review of patient's allergies indicates:  No Known Allergies  Review of Systems   Constitution: Negative for malaise/fatigue.   HENT: Negative for hearing loss.    Eyes: Negative for double vision and visual disturbance.    Cardiovascular: Negative for chest pain.   Respiratory: Negative for shortness of breath.    Endocrine: Negative for cold intolerance.   Hematologic/Lymphatic: Does not bruise/bleed easily.   Skin: Negative for poor wound healing and suspicious lesions.   Musculoskeletal: Negative for gout, joint pain and joint swelling.   Gastrointestinal: Positive for heartburn. Negative for nausea and vomiting.   Genitourinary: Negative for dysuria.   Neurological: Negative for numbness, paresthesias and sensory change.   Psychiatric/Behavioral: Negative for depression, memory loss and substance abuse. The patient has insomnia. The patient is not nervous/anxious.    Allergic/Immunologic: Negative for persistent infections.       Objective:   Body mass index is 36.14 kg/m².  Vitals:    11/10/20 1455   BP: (!) 143/88   Pulse: 105                General    Constitutional: She is oriented to person, place, and time. She appears well-developed and well-nourished. No distress.   HENT:   Head: Normocephalic.   Mouth/Throat: Oropharynx is clear and moist.   Eyes: EOM are normal.   Neck: Normal range of motion.   Cardiovascular: Normal rate.    Pulmonary/Chest: Effort normal.   Abdominal: Soft.   Neurological: She is alert and oriented to person, place, and time. No cranial nerve deficit.   Psychiatric: She has a normal mood and affect.             Right Hand/Wrist Exam     Inspection   Scars: Wrist - absent Hand -  absent  Effusion: Wrist - absent Hand -  absent    Pain   Wrist - The patient exhibits pain of the extensory musculature.    Tests   Finkelstein's test: positive      Other     Neuorologic Exam    Median Distribution: normal  Ulnar Distribution: normal  Radial Distribution: normal    Comments:  The patient is tender 1st dorsal extensor tendon compartment with a positive Finkelstein test.  There are no motor or sensory deficits.          Vascular Exam       Capillary Refill  Right Hand: normal capillary refill          radiographs were not obtained today.  Previous radiographs of both wrist have been normal  Assessment:     Encounter Diagnosis   Name Primary?    De Quervain's disease (radial styloid tenosynovitis) Yes        Plan:     The patient wishes to have a right de Quervain release.  She was counseled regarding the surgery.  Risk complications and alternatives were discussed including the risk of infection, anesthetic risk, injury to nerves and vessels, loss of motion, and possible need for additional surgeries were discussed.  She seems to understand and agree that surgery.  All questions were answered.                Disclaimer: This note was prepared using a voice recognition system and is likely to have sound alike errors within the text.

## 2020-11-10 NOTE — H&P (VIEW-ONLY)
The patient is a 70-year-old female with right de Quervain tendonitis.  She had left de Quervain release and is quite pleased with that.  She wishes to have a right de Quervain release.  She has not responded to splinting or injection.  Subjective:     Patient ID: Cassandra Campos is a 70 y.o. female.    Chief Complaint: Pain of the Right Wrist    The patient is a 70-year-old female with right de Quervain tendonitis that is not responded to injection or splinting.  She is pleased the results were left de Quervain release and wishes to have a right de Quervain release      Past Medical History:   Diagnosis Date    Arthritis     hands    Bilateral bunions     Borderline glaucoma     De Quervain's disease (radial styloid tenosynovitis)     Gastritis     upper GI 2017    Hydradenitis     Hyperlipidemia     Hypertension     Insomnia     Migraines 2000    Nasal septum perforation     Obesity     Pneumonia     Restrictive airway disease     Sleep apnea     SVT (supraventricular tachycardia) 2013    Trigger finger     Type 2 diabetes mellitus      Past Surgical History:   Procedure Laterality Date    AXILLARY HIDRADENITIS EXCISION      BONE EXOSTOSIS EXCISION Right 2018    Procedure: EXCISION, EXOSTOSIS;  Surgeon: Jayro Pedraza Sr., MD;  Location: [facility] OR;  Service: Orthopedics;  Laterality: Right;    BREAST BIOPSY Bilateral     BREAST LUMPECTOMY Bilateral 1998    Benign    CARPAL TUNNEL RELEASE      bilateral    CATARACT EXTRACTION Bilateral     OU     SECTION  1979    CHOLECYSTECTOMY  2014    COLONOSCOPY N/A 10/2/2020    Procedure: COLONOSCOPY;  Surgeon: Tushar Edwards MD;  Location: [facility];  Service: Endoscopy;  Laterality: N/A;    COLONOSCOPY W/ POLYPECTOMY  10/02/2020    Polyps x3, repeat 5 years; Tushar Edwards MD     CYST REMOVAL  2015    sebaceous cyst removed from face    DE QUERVAIN'S RELEASE Left 2020    Procedure: RELEASE, HAND, FOR  DEQUERVAIN'S TENOSYNOVITIS;  Surgeon: Jaime Oswald MD;  Location: [facility] OR;  Service: Orthopedics;  Laterality: Left;    gastric sleeve  2017    Dr. Waston    KNEE SURGERY Right     OLECRANON BURSECTOMY Right 2018    Procedure: BURSECTOMY, OLECRANON;  Surgeon: Jayro Pedraza Sr., MD;  Location: [facility];  Service: Orthopedics;  Laterality: Right;    SURGICAL REMOVAL OF BUNION WITH OSTEOTOMY OF METATARSAL BONE Left 5/10/2019    Procedure: BUNIONECTOMY, WITH METATARSAL OSTEOTOMY;  Surgeon: Srinivasan Villanueva DPM;  Location: [facility] OR;  Service: Podiatry;  Laterality: Left;    SURGICAL REMOVAL OF BUNION WITH OSTEOTOMY OF METATARSAL BONE Right 2019    Procedure: BUNIONECTOMY, WITH METATARSAL OSTEOTOMY;  Surgeon: Srinivasan Villanueva DPM;  Location: [facility];  Service: Podiatry;  Laterality: Right;    TONSILLECTOMY, ADENOIDECTOMY  1980s    TRIGGER FINGER RELEASE Right 2015    Dr. Pedraza     Family History   Problem Relation Age of Onset    Prostate cancer Brother     Diabetes Maternal Aunt      Social History     Socioeconomic History    Marital status:      Spouse name: Not on file    Number of children: 1    Years of education: Not on file    Highest education level: Not on file   Occupational History    Occupation:  aid   Social Needs    Financial resource strain: Not very hard    Food insecurity     Worry: Sometimes true     Inability: Sometimes true    Transportation needs     Medical: No     Non-medical: No   Tobacco Use    Smoking status: Former Smoker     Packs/day: 0.50     Years: 30.00     Pack years: 15.00     Types: Cigarettes     Start date: 1970     Quit date: 2012     Years since quittin.8    Smokeless tobacco: Never Used   Substance and Sexual Activity    Alcohol use: Yes     Frequency: Never     Drinks per session: 1 or 2     Binge frequency: Never     Comment: rarely // No alcohol 72 HOURS prior to surgery    Drug use: No     Sexual activity: Not Currently     Partners: Male   Lifestyle    Physical activity     Days per week: 5 days     Minutes per session: 90 min    Stress: To some extent   Relationships    Social connections     Talks on phone: More than three times a week     Gets together: Once a week     Attends Confucianism service: More than 4 times per year     Active member of club or organization: Yes     Attends meetings of clubs or organizations: More than 4 times per year     Relationship status:    Other Topics Concern    Not on file   Social History Narrative    Single, part-time teacher. Masters degree biology.      Medication List with Changes/Refills   Current Medications    ACCU-CHEK FASTCLIX MISC    USE ONE TIME DAILY    ACCU-CHEK SMARTVIEW TEST STRIP STRP    TEST ONE TIME DAILY    ALBUTEROL (PROVENTIL/VENTOLIN HFA) 90 MCG/ACTUATION INHALER    INHALE 2 PUFFS INTO THE LUNGS EVERY 4 TO 6 HOURS AS NEEDED FOR WHEEZING OR SHORTNESS OF BREATH.    AMITRIPTYLINE (ELAVIL) 100 MG TABLET    Take 1 tablet (100 mg total) by mouth nightly.    BLOOD-GLUCOSE METER KIT    Use as instructed    ESZOPICLONE (LUNESTA) 3 MG TAB    TAKE 1 TABLET EVERY NIGHT AS NEEDED FOR SLEEP    LANCETS MISC    1 each by Misc.(Non-Drug; Combo Route) route once daily.    LORAZEPAM (ATIVAN) 0.5 MG TABLET    Take 1 tablet (0.5 mg total) by mouth 2 (two) times daily as needed for Anxiety.    METFORMIN (GLUCOPHAGE-XR) 500 MG ER 24HR TABLET    Take 2 tablets (1,000 mg total) by mouth once daily.    METOPROLOL SUCCINATE (TOPROL-XL) 50 MG 24 HR TABLET    TAKE 1 TABLET EVERY DAY    OMEPRAZOLE (PRILOSEC) 20 MG CAPSULE    TAKE 1 CAPSULE EVERY DAY    PRAVASTATIN (PRAVACHOL) 40 MG TABLET    TAKE 1 TABLET BY MOUTH EVERY DAY AT NIGHT     Review of patient's allergies indicates:  No Known Allergies  Review of Systems   Constitution: Negative for malaise/fatigue.   HENT: Negative for hearing loss.    Eyes: Negative for double vision and visual disturbance.    Cardiovascular: Negative for chest pain.   Respiratory: Negative for shortness of breath.    Endocrine: Negative for cold intolerance.   Hematologic/Lymphatic: Does not bruise/bleed easily.   Skin: Negative for poor wound healing and suspicious lesions.   Musculoskeletal: Negative for gout, joint pain and joint swelling.   Gastrointestinal: Positive for heartburn. Negative for nausea and vomiting.   Genitourinary: Negative for dysuria.   Neurological: Negative for numbness, paresthesias and sensory change.   Psychiatric/Behavioral: Negative for depression, memory loss and substance abuse. The patient has insomnia. The patient is not nervous/anxious.    Allergic/Immunologic: Negative for persistent infections.       Objective:   Body mass index is 36.14 kg/m².  Vitals:    11/10/20 1455   BP: (!) 143/88   Pulse: 105                General    Constitutional: She is oriented to person, place, and time. She appears well-developed and well-nourished. No distress.   HENT:   Head: Normocephalic.   Mouth/Throat: Oropharynx is clear and moist.   Eyes: EOM are normal.   Neck: Normal range of motion.   Cardiovascular: Normal rate.    Pulmonary/Chest: Effort normal.   Abdominal: Soft.   Neurological: She is alert and oriented to person, place, and time. No cranial nerve deficit.   Psychiatric: She has a normal mood and affect.             Right Hand/Wrist Exam     Inspection   Scars: Wrist - absent Hand -  absent  Effusion: Wrist - absent Hand -  absent    Pain   Wrist - The patient exhibits pain of the extensory musculature.    Tests   Finkelstein's test: positive      Other     Neuorologic Exam    Median Distribution: normal  Ulnar Distribution: normal  Radial Distribution: normal    Comments:  The patient is tender 1st dorsal extensor tendon compartment with a positive Finkelstein test.  There are no motor or sensory deficits.          Vascular Exam       Capillary Refill  Right Hand: normal capillary refill          radiographs were not obtained today.  Previous radiographs of both wrist have been normal  Assessment:     Encounter Diagnosis   Name Primary?    De Quervain's disease (radial styloid tenosynovitis) Yes        Plan:     The patient wishes to have a right de Quervain release.  She was counseled regarding the surgery.  Risk complications and alternatives were discussed including the risk of infection, anesthetic risk, injury to nerves and vessels, loss of motion, and possible need for additional surgeries were discussed.  She seems to understand and agree that surgery.  All questions were answered.                Disclaimer: This note was prepared using a voice recognition system and is likely to have sound alike errors within the text.      [FreeTextEntry1] : f/u abnl cv testing

## 2020-11-11 DIAGNOSIS — M65.4 DE QUERVAIN'S TENOSYNOVITIS: Primary | ICD-10-CM

## 2020-11-11 LAB
BASOPHILS # BLD AUTO: 0.06 K/UL (ref 0–0.2)
BASOPHILS NFR BLD: 0.8 % (ref 0–1.9)
DIFFERENTIAL METHOD: ABNORMAL
EOSINOPHIL # BLD AUTO: 0.2 K/UL (ref 0–0.5)
EOSINOPHIL NFR BLD: 2.1 % (ref 0–8)
ERYTHROCYTE [DISTWIDTH] IN BLOOD BY AUTOMATED COUNT: 16.4 % (ref 11.5–14.5)
HCT VFR BLD AUTO: 42 % (ref 37–48.5)
HGB BLD-MCNC: 13.4 G/DL (ref 12–16)
IMM GRANULOCYTES # BLD AUTO: 0.01 K/UL (ref 0–0.04)
IMM GRANULOCYTES NFR BLD AUTO: 0.1 % (ref 0–0.5)
LYMPHOCYTES # BLD AUTO: 3.9 K/UL (ref 1–4.8)
LYMPHOCYTES NFR BLD: 51.2 % (ref 18–48)
MCH RBC QN AUTO: 24.6 PG (ref 27–31)
MCHC RBC AUTO-ENTMCNC: 31.9 G/DL (ref 32–36)
MCV RBC AUTO: 77 FL (ref 82–98)
MONOCYTES # BLD AUTO: 0.7 K/UL (ref 0.3–1)
MONOCYTES NFR BLD: 8.6 % (ref 4–15)
NEUTROPHILS # BLD AUTO: 2.8 K/UL (ref 1.8–7.7)
NEUTROPHILS NFR BLD: 37.2 % (ref 38–73)
NRBC BLD-RTO: 0 /100 WBC
PLATELET # BLD AUTO: 262 K/UL (ref 150–350)
PMV BLD AUTO: 10.8 FL (ref 9.2–12.9)
RBC # BLD AUTO: 5.44 M/UL (ref 4–5.4)
WBC # BLD AUTO: 7.56 K/UL (ref 3.9–12.7)

## 2020-11-17 ENCOUNTER — LAB VISIT (OUTPATIENT)
Dept: OTOLARYNGOLOGY | Facility: CLINIC | Age: 70
End: 2020-11-17
Payer: MEDICARE

## 2020-11-17 DIAGNOSIS — Z01.818 PREOP TESTING: ICD-10-CM

## 2020-11-17 PROCEDURE — U0003 INFECTIOUS AGENT DETECTION BY NUCLEIC ACID (DNA OR RNA); SEVERE ACUTE RESPIRATORY SYNDROME CORONAVIRUS 2 (SARS-COV-2) (CORONAVIRUS DISEASE [COVID-19]), AMPLIFIED PROBE TECHNIQUE, MAKING USE OF HIGH THROUGHPUT TECHNOLOGIES AS DESCRIBED BY CMS-2020-01-R: HCPCS

## 2020-11-18 ENCOUNTER — NURSE TRIAGE (OUTPATIENT)
Dept: ADMINISTRATIVE | Facility: CLINIC | Age: 70
End: 2020-11-18

## 2020-11-18 ENCOUNTER — PATIENT MESSAGE (OUTPATIENT)
Dept: ADMINISTRATIVE | Facility: OTHER | Age: 70
End: 2020-11-18

## 2020-11-18 NOTE — TELEPHONE ENCOUNTER
RN ELIZABETH for pt providing info on how long results take to come back in response to message sent to COVID Questions inbox.    Reason for Disposition   Message left on unidentified voice mail. Phone number verified.    Protocols used: NO CONTACT OR DUPLICATE CONTACT CALL-A-OH

## 2020-11-19 ENCOUNTER — HOSPITAL ENCOUNTER (OUTPATIENT)
Dept: CARDIOLOGY | Facility: HOSPITAL | Age: 70
Discharge: HOME | End: 2020-11-19
Attending: ORTHOPAEDIC SURGERY
Payer: MEDICARE

## 2020-11-19 DIAGNOSIS — Z01.818 PREOP TESTING: Primary | ICD-10-CM

## 2020-11-19 DIAGNOSIS — Z01.818 PREOP TESTING: ICD-10-CM

## 2020-11-19 LAB — SARS-COV-2 RNA RESP QL NAA+PROBE: NOT DETECTED

## 2020-11-19 PROCEDURE — 93010 EKG 12-LEAD: ICD-10-PCS | Mod: HCNC,,, | Performed by: INTERNAL MEDICINE

## 2020-11-19 PROCEDURE — 93010 ELECTROCARDIOGRAM REPORT: CPT | Mod: HCNC,,, | Performed by: INTERNAL MEDICINE

## 2020-11-19 PROCEDURE — 93005 ELECTROCARDIOGRAM TRACING: CPT | Mod: HCNC

## 2020-11-19 NOTE — PRE ADMISSION SCREENING
Pre op instructions reviewed with patient per phone:    To confirm, Your surgeon has instructed you:  Surgery is scheduled 11/20/20at 1015.      [facility] main lobby by 0890.  Pre admit office to call later today if arrival time changes    Covid 19 testing is scheduled for 11/17/20 at 1120 @ ON  Please self quarantine after Covid testing, prior to surgery    INSTRUCTIONS IMPORTANT!!!  ¨ No smoking after 12 midnight, the night before surgery.  ¨ No solid food after 12 midnight, but you may have clear liquids up until 3 hours prior to surgery.  This includes: grape, cranberry, and apple juice (not orange, and no coffee.)   ¨ OK to brush teeth, but no gum, candy or mints!    ¨ Take only these medicines with a small swallow of water-morning of surgery.  N/A             ____   Due to COVID 19 concerns, 1 visitor will be allowed in the pre operative area, and must adhere to social distancing guidelines.  One visitor/family member is currently allowed to visit in-patient rooms from 08:00 a.m to 6:00 p.m  ____   Family/caregivers will be updated re pt status via text/cell phone      ____  Do not wear makeup, including mascara.  ____  No powder, lotions or creams to surgical area.  ____  Please remove all jewelry, including piercings and leave at home.  ____  No money or valuables needed. Please leave at home.  ____  Please bring identification and insurance information to hospital.  ____  If going home the same day, arrange for a ride home. You will not be able to   drive if Anesthesia was used.  ____  Children, under 12 years old, must remain in the waiting room with an adult.  They are not allowed in patient areas.  ____  Wear loose fitting clothing. Allow for dressings, bandages.  ____  Stop Aspirin, Ibuprofen, Motrin and Aleve at least 5-7 days before surgery, unless otherwise instructed by your doctor, or the nurse.   You MAY use Tylenol/acetaminophen until day of  surgery.  ____  If you take diabetic medication, do not take am of surgery unless instructed by   Doctor.  ____ Stop taking any Fish Oil supplement or any Vitamins that contain Vitamin E at least 5 days prior to surgery.          Bathing Instructions-- The night before surgery and the morning prior to coming to the hospital:   -Do not shave the surgical area.   -Shower and wash your hair and body as usual with your regular soap and shampoo.   -Rinse your hair and body completely.   -Use one packet of hibiclens to wash the surgical site (using your hand) gently for 5 minutes.  Do not scrub you skin too hard.   -Do not use hibiclens on your head, face, or genitals.   -Do not wash with regular soap after you use the hibiclens.   -Rinse your body thoroughly.   -Dry with clean, soft towel.  Do not use lotion, cream, deodorant, or powders on   the surgical site.    Use antibacterial soap in place of hibiclens if your surgery is on the head, face or genitals.         Surgical Site Infection    Prevention of surgical site infections:     -Keep incisions clean and dry.   -Do not soak/submerge incisions in water until completely healed.   -Do not apply lotions, powders, creams, or deodorants to site.   -Always make sure hands are cleaned with antibacterial soap/ alcohol-based   prior to touching the surgical site.  (This includes doctors, nurses, staff, and yourself.)    Signs and symptoms:   -Redness and pain around the area where you had surgery   -Drainage of cloudy fluid from your surgical wound   -Fever over 100.4  I have read or had read and explained to me, and understand the above information.

## 2020-11-20 ENCOUNTER — HOSPITAL ENCOUNTER (OUTPATIENT)
Facility: HOSPITAL | Age: 70
Discharge: HOME | End: 2020-11-20
Attending: ORTHOPAEDIC SURGERY | Admitting: ORTHOPAEDIC SURGERY
Payer: MEDICARE

## 2020-11-20 ENCOUNTER — ANESTHESIA EVENT (OUTPATIENT)
Dept: SURGERY | Facility: HOSPITAL | Age: 70
End: 2020-11-20
Payer: MEDICARE

## 2020-11-20 ENCOUNTER — ANESTHESIA (OUTPATIENT)
Dept: SURGERY | Facility: HOSPITAL | Age: 70
End: 2020-11-20
Payer: MEDICARE

## 2020-11-20 VITALS
HEART RATE: 95 BPM | BODY MASS INDEX: 35.75 KG/M2 | RESPIRATION RATE: 18 BRPM | SYSTOLIC BLOOD PRESSURE: 140 MMHG | HEIGHT: 63 IN | WEIGHT: 201.75 LBS | DIASTOLIC BLOOD PRESSURE: 68 MMHG | TEMPERATURE: 98 F | OXYGEN SATURATION: 95 %

## 2020-11-20 DIAGNOSIS — M65.4 TENOSYNOVITIS, DE QUERVAIN: Primary | ICD-10-CM

## 2020-11-20 LAB
POCT GLUCOSE: 159 MG/DL (ref 70–110)
POCT GLUCOSE: 165 MG/DL (ref 70–110)

## 2020-11-20 PROCEDURE — 63600175 PHARM REV CODE 636 W HCPCS: Mod: HCNC | Performed by: NURSE ANESTHETIST, CERTIFIED REGISTERED

## 2020-11-20 PROCEDURE — 25000 PR INCIS TENDON SHEATH,RADIAL STYLOID: ICD-10-PCS | Mod: HCNC,RT,, | Performed by: ORTHOPAEDIC SURGERY

## 2020-11-20 PROCEDURE — 37000008 HC ANESTHESIA 1ST 15 MINUTES: Mod: HCNC | Performed by: ORTHOPAEDIC SURGERY

## 2020-11-20 PROCEDURE — 37000009 HC ANESTHESIA EA ADD 15 MINS: Mod: HCNC | Performed by: ORTHOPAEDIC SURGERY

## 2020-11-20 PROCEDURE — 25000003 PHARM REV CODE 250: Mod: HCNC | Performed by: NURSE ANESTHETIST, CERTIFIED REGISTERED

## 2020-11-20 PROCEDURE — 36000706: Mod: HCNC | Performed by: ORTHOPAEDIC SURGERY

## 2020-11-20 PROCEDURE — 36000707: Mod: HCNC | Performed by: ORTHOPAEDIC SURGERY

## 2020-11-20 PROCEDURE — 71000015 HC POSTOP RECOV 1ST HR: Mod: HCNC | Performed by: ORTHOPAEDIC SURGERY

## 2020-11-20 PROCEDURE — 25000003 PHARM REV CODE 250: Mod: HCNC | Performed by: ORTHOPAEDIC SURGERY

## 2020-11-20 PROCEDURE — 71000033 HC RECOVERY, INTIAL HOUR: Mod: HCNC | Performed by: ORTHOPAEDIC SURGERY

## 2020-11-20 PROCEDURE — 25000 INCISION OF TENDON SHEATH: CPT | Mod: HCNC,RT,, | Performed by: ORTHOPAEDIC SURGERY

## 2020-11-20 PROCEDURE — 63600175 PHARM REV CODE 636 W HCPCS: Mod: HCNC | Performed by: PHYSICIAN ASSISTANT

## 2020-11-20 RX ORDER — HYDROCODONE BITARTRATE AND ACETAMINOPHEN 10; 325 MG/1; MG/1
1 TABLET ORAL EVERY 6 HOURS PRN
Qty: 15 TABLET | Refills: 0 | Status: SHIPPED | OUTPATIENT
Start: 2020-11-20 | End: 2020-11-25

## 2020-11-20 RX ORDER — FENTANYL CITRATE 50 UG/ML
25 INJECTION, SOLUTION INTRAMUSCULAR; INTRAVENOUS EVERY 5 MIN PRN
Status: DISCONTINUED | OUTPATIENT
Start: 2020-11-20 | End: 2020-11-20 | Stop reason: HOSPADM

## 2020-11-20 RX ORDER — LIDOCAINE HYDROCHLORIDE 20 MG/ML
INJECTION, SOLUTION EPIDURAL; INFILTRATION; INTRACAUDAL; PERINEURAL
Status: DISCONTINUED | OUTPATIENT
Start: 2020-11-20 | End: 2020-11-20 | Stop reason: HOSPADM

## 2020-11-20 RX ORDER — HYDROCODONE BITARTRATE AND ACETAMINOPHEN 5; 325 MG/1; MG/1
1 TABLET ORAL EVERY 4 HOURS PRN
Status: DISCONTINUED | OUTPATIENT
Start: 2020-11-20 | End: 2020-11-20 | Stop reason: HOSPADM

## 2020-11-20 RX ORDER — ONDANSETRON 2 MG/ML
4 INJECTION INTRAMUSCULAR; INTRAVENOUS DAILY PRN
Status: DISCONTINUED | OUTPATIENT
Start: 2020-11-20 | End: 2020-11-20 | Stop reason: HOSPADM

## 2020-11-20 RX ORDER — FENTANYL CITRATE 50 UG/ML
INJECTION, SOLUTION INTRAMUSCULAR; INTRAVENOUS
Status: DISCONTINUED | OUTPATIENT
Start: 2020-11-20 | End: 2020-11-20

## 2020-11-20 RX ORDER — PROPOFOL 10 MG/ML
VIAL (ML) INTRAVENOUS
Status: DISCONTINUED | OUTPATIENT
Start: 2020-11-20 | End: 2020-11-20

## 2020-11-20 RX ORDER — CHLORHEXIDINE GLUCONATE ORAL RINSE 1.2 MG/ML
10 SOLUTION DENTAL 2 TIMES DAILY
Status: DISCONTINUED | OUTPATIENT
Start: 2020-11-20 | End: 2020-11-20 | Stop reason: HOSPADM

## 2020-11-20 RX ORDER — CEFAZOLIN SODIUM 2 G/50ML
2 SOLUTION INTRAVENOUS
Status: COMPLETED | OUTPATIENT
Start: 2020-11-20 | End: 2020-11-20

## 2020-11-20 RX ORDER — LIDOCAINE HYDROCHLORIDE 20 MG/ML
INJECTION INTRAVENOUS
Status: DISCONTINUED | OUTPATIENT
Start: 2020-11-20 | End: 2020-11-20

## 2020-11-20 RX ORDER — SODIUM CHLORIDE, SODIUM LACTATE, POTASSIUM CHLORIDE, CALCIUM CHLORIDE 600; 310; 30; 20 MG/100ML; MG/100ML; MG/100ML; MG/100ML
INJECTION, SOLUTION INTRAVENOUS
Status: ACTIVE | OUTPATIENT
Start: 2020-11-20

## 2020-11-20 RX ADMIN — PROPOFOL 50 MG: 10 INJECTION, EMULSION INTRAVENOUS at 09:11

## 2020-11-20 RX ADMIN — CEFAZOLIN SODIUM 2 G: 2 SOLUTION INTRAVENOUS at 09:11

## 2020-11-20 RX ADMIN — PROPOFOL 20 MG: 10 INJECTION, EMULSION INTRAVENOUS at 09:11

## 2020-11-20 RX ADMIN — LIDOCAINE HYDROCHLORIDE 100 MG: 20 INJECTION, SOLUTION INTRAVENOUS at 09:11

## 2020-11-20 RX ADMIN — FENTANYL CITRATE 100 MCG: 50 INJECTION, SOLUTION INTRAMUSCULAR; INTRAVENOUS at 09:11

## 2020-11-20 RX ADMIN — SODIUM CHLORIDE, SODIUM LACTATE, POTASSIUM CHLORIDE, AND CALCIUM CHLORIDE: 600; 310; 30; 20 INJECTION, SOLUTION INTRAVENOUS at 09:11

## 2020-11-20 NOTE — DISCHARGE SUMMARY
[facility]  Discharge Note  Short Stay    Procedure(s) (LRB):  RELEASE, HAND, FOR DEQUERVAIN'S TENOSYNOVITIS (Right)    OUTCOME: Patient tolerated treatment/procedure well without complication and is now ready for discharge.    DISPOSITION: Home or Self Care    FINAL DIAGNOSIS:  Right de Quervain tendonitis    FOLLOWUP: In clinic    DISCHARGE INSTRUCTIONS:    Discharge Procedure Orders   Diet general     Call MD for:  temperature >100.4     Call MD for:  persistent nausea and vomiting     Call MD for:  severe uncontrolled pain     Call MD for:  difficulty breathing, headache or visual disturbances     Call MD for:  redness, tenderness, or signs of infection (pain, swelling, redness, odor or green/yellow discharge around incision site)     Call MD for:  hives     Call MD for:  persistent dizziness or light-headedness     Call MD for:  extreme fatigue

## 2020-11-20 NOTE — TRANSFER OF CARE
"Anesthesia Transfer of Care Note    Patient: Cassandra Campos    Procedure(s) Performed: Procedure(s) (LRB):  RELEASE, HAND, FOR DEQUERVAIN'S TENOSYNOVITIS (Right)    Anesthesia Type: MAC    Transport from OR: Transported from OR on room air with adequate spontaneous ventilation    Post pain: adequate analgesia    Post assessment: no apparent anesthetic complications    Post vital signs: stable    Level of consciousness: awake    Nausea/Vomiting: no nausea/vomiting    Complications: none    Transfer of care protocol was followed      Last vitals:   Visit Vitals  /75 (BP Location: Right arm, Patient Position: Sitting)   Pulse 102   Temp 36.5 °C (97.7 °F) (Temporal)   Resp 20   Ht 5' 3" (1.6 m)   Wt 91.5 kg (201 lb 11.5 oz)   SpO2 95%   Breastfeeding No   BMI 35.73 kg/m²     "

## 2020-11-20 NOTE — ANESTHESIA PREPROCEDURE EVALUATION
11/20/2020  Cassandra Campos is a 70 y.o., female.    Anesthesia Evaluation    I have reviewed the Patient Summary Reports.    I have reviewed the Nursing Notes. I have reviewed the NPO Status.   I have reviewed the Medications.     Review of Systems  Anesthesia Hx:  No problems with previous Anesthesia Denies Hx of Anesthetic complications  Denies Family Hx of Anesthesia complications.   Denies Personal Hx of Anesthesia complications.   Social:  Non-Smoker, No Alcohol Use    Cardiovascular:   Hypertension ECG has been reviewed.    Pulmonary:   Pneumonia Sleep Apnea    Renal/:  Renal/ Normal     Hepatic/GI:   GERD    Musculoskeletal:   Arthritis     Neurological:   Headaches    Endocrine:   Diabetes, type 2    Psych:  Psychiatric Normal         Patient Active Problem List   Diagnosis    Hyperlipidemia associated with type 2 diabetes mellitus    Insomnia    Hip arthritis    Obesity    Paroxysmal SVT (supraventricular tachycardia)    GERD (gastroesophageal reflux disease)    Prepatellar bursitis of right knee    Transient synovitis of right knee    Chondromalacia of right knee    Essential hypertension    Calcified granuloma of lung    Osteopenia    Onychomycosis of multiple toenails with type 2 diabetes mellitus    Controlled type 2 diabetes mellitus without complication, without long-term current use of insulin    Secondary hypertension    Unequal blood pressures in paired extremities    De Quervain's disease (radial styloid tenosynovitis)    Colon cancer screening    Tenosynovitis, de Quervain     No current facility-administered medications on file prior to encounter.      Current Outpatient Medications on File Prior to Encounter   Medication Sig Dispense Refill    ACCU-CHEK FASTCLIX Misc USE ONE TIME DAILY 100 each 3    ACCU-CHEK SMARTVIEW TEST STRIP Strp TEST ONE TIME DAILY 100  strip 3    amitriptyline (ELAVIL) 100 MG tablet Take 1 tablet (100 mg total) by mouth nightly. 90 tablet 4    eszopiclone (LUNESTA) 3 mg Tab TAKE 1 TABLET EVERY NIGHT AS NEEDED FOR SLEEP (Patient taking differently: Take 3 mg by mouth nightly as needed. TAKE 1 TABLET EVERY NIGHT AS NEEDED FOR SLEEP) 90 tablet 1    HYDROcodone-acetaminophen (NORCO) 5-325 mg per tablet Take 1 tablet by mouth every 6 (six) hours as needed for Pain. 28 tablet 0    LORazepam (ATIVAN) 0.5 MG tablet Take 1 tablet (0.5 mg total) by mouth 2 (two) times daily as needed for Anxiety. 25 tablet 0    metFORMIN (GLUCOPHAGE-XR) 500 MG ER 24hr tablet Take 2 tablets (1,000 mg total) by mouth once daily. (Patient taking differently: Take 1,000 mg by mouth nightly. ) 180 tablet 3    metoprolol succinate (TOPROL-XL) 50 MG 24 hr tablet TAKE 1 TABLET EVERY DAY (Patient taking differently: Take 50 mg by mouth every evening. DO NOT CRUSH OR CHEW; SWALLOW WHOLE.) 90 tablet 3    omeprazole (PRILOSEC) 20 MG capsule TAKE 1 CAPSULE EVERY DAY (Patient taking differently: Take 20 mg by mouth nightly. ) 90 capsule 1    pravastatin (PRAVACHOL) 40 MG tablet TAKE 1 TABLET BY MOUTH EVERY DAY AT NIGHT (Patient taking differently: Take 40 mg by mouth every evening. ) 90 tablet 4    albuterol (PROVENTIL/VENTOLIN HFA) 90 mcg/actuation inhaler INHALE 2 PUFFS INTO THE LUNGS EVERY 4 TO 6 HOURS AS NEEDED FOR WHEEZING OR SHORTNESS OF BREATH. 18 g 0    blood-glucose meter kit Use as instructed 1 each 0    lancets Misc 1 each by Misc.(Non-Drug; Combo Route) route once daily. 100 each 4           Physical Exam  General:  Well nourished    Airway/Jaw/Neck:  Airway Findings: Mouth Opening: Normal Tongue: Normal  General Airway Assessment: Adult  Mallampati: II  TM Distance: 4 - 6 cm  Jaw/Neck Findings:  Neck ROM: Normal ROM      Dental:  Dental Findings: Edentulous   Chest/Lungs:  Chest/Lungs Findings: Clear to auscultation, Normal Respiratory Rate      Heart/Vascular:  Heart Findings: Rate: Normal  Rhythm: Regular Rhythm  Sounds: Normal        Mental Status:  Mental Status Findings:  Cooperative, Alert and Oriented     .  Lab Results   Component Value Date    WBC 7.56 11/10/2020    HGB 13.4 11/10/2020    HCT 42.0 11/10/2020    MCV 77 (L) 11/10/2020     11/10/2020         Chemistry        Component Value Date/Time     11/10/2020 1609    K 4.1 11/10/2020 1609     11/10/2020 1609    CO2 29 11/10/2020 1609    BUN 11 11/10/2020 1609    CREATININE 0.7 11/10/2020 1609     (H) 11/10/2020 1609        Component Value Date/Time    CALCIUM 10.2 11/10/2020 1609    ALKPHOS 116 11/10/2020 1609    AST 65 (H) 11/10/2020 1609    ALT 70 (H) 11/10/2020 1609    BILITOT 0.4 11/10/2020 1609    ESTGFRAFRICA >60.0 11/10/2020 1609    EGFRNONAA >60.0 11/10/2020 1609            Anesthesia Plan  Type of Anesthesia, risks & benefits discussed:  Anesthesia Type:  general  Patient's Preference:   Intra-op Monitoring Plan: standard ASA monitors  Intra-op Monitoring Plan Comments:   Post Op Pain Control Plan: per primary service following discharge from PACU  Post Op Pain Control Plan Comments:   Induction:   IV  Beta Blocker:  Patient is on a Beta-Blocker and has received one dose within the past 24 hours (No further documentation required).       Informed Consent: Patient understands risks and agrees with Anesthesia plan.  Questions answered. Anesthesia consent signed with patient.  ASA Score: 3     Day of Surgery Review of History & Physical: I have interviewed and examined the patient. I have reviewed the patient's H&P dated:  There are no significant changes.  H&P update referred to the surgeon.         Ready For Surgery From Anesthesia Perspective.

## 2020-11-20 NOTE — ANESTHESIA RELEASE NOTE
"Anesthesia Release from PACU Note    Patient: Cassandra Campos    Procedure(s) Performed: Procedure(s) (LRB):  RELEASE, HAND, FOR DEQUERVAIN'S TENOSYNOVITIS (Right)    Anesthesia type: MAC    Post pain: Adequate analgesia    Post assessment: no apparent anesthetic complications    Last Vitals:   Visit Vitals  /75 (BP Location: Right arm, Patient Position: Sitting)   Pulse 102   Temp 36.5 °C (97.7 °F) (Temporal)   Resp 20   Ht 5' 3" (1.6 m)   Wt 91.5 kg (201 lb 11.5 oz)   SpO2 95%   Breastfeeding No   BMI 35.73 kg/m²       Post vital signs: stable    Level of consciousness: awake    Nausea/Vomiting: no nausea/no vomiting    Complications: none    Airway Patency: patent    Respiratory: unassisted    Cardiovascular: stable and blood pressure at baseline    Hydration: euvolemic  "

## 2020-11-20 NOTE — OP NOTE
[facility] -   General Surgery  Operative Note    SUMMARY     Date of Procedure: 11/20/2020     Procedure: Procedure(s) (LRB):  RELEASE, HAND, FOR DEQUERVAIN'S TENOSYNOVITIS (Right)       Surgeon(s) and Role:     * Jaime Oswald MD - Primary    Assisting Surgeon: None    Pre-Operative Diagnosis: De Quervain's tenosynovitis [M65.4] right    Post-Operative Diagnosis: Post-Op Diagnosis Codes:     * De Quervain's tenosynovitis [M65.4] right    Anesthesia: Local MAC    Description:  The patient is taken the operating room where 10 cc of 2% plain lidocaine use local anesthetic about the radial styloid area right wrist.  Satisfactory anesthesia had been achieved the right hand was prepped and draped usual sterile fashion.  After exsanguination of the extremity a proximal tourniquet was inflated to 250 mm of mercury after patient received 2 g of Ancef intravenously preoperatively.  At this time a transverse incision was made over the 1st dorsal extensor tendon compartment of finger breath proximal to the radial styloid.  The incision was extended using blunt sharp dissection using 3.5 loupe magnification.  Cutaneous neurovascular structures were identified and carefully retracted.  Extensor tendon sheath 1st dorsal extensor tendon compartment was sharply incised under direct vision.  A search was made for separate compartment for the extensor pollicis brevis but no separate compartment was identified.  Skin was closed using horizontal mattress 4 nylon suture.  Xeroform gauze 4x4s and 2 ABD pads over wrapped with 3 in gauze dressing.  The patient tolerated the procedure well and was transferred recovery room in satisfactory condition.    Significant Surgical Tasks Conducted by the Assistant(s), if Applicable:  No assistant    Complications:  None     Estimated Blood Loss (EBL):  5 mL           Implants: None    Specimens: None           Condition: Good    Disposition: PACU - hemodynamically  stable.    Attestation: I was present and scrubbed for the entire procedure.

## 2020-11-21 ENCOUNTER — PATIENT MESSAGE (OUTPATIENT)
Dept: CARDIOLOGY | Facility: CLINIC | Age: 70
End: 2020-11-21

## 2020-11-23 NOTE — ANESTHESIA POSTPROCEDURE EVALUATION
Anesthesia Post Evaluation    Patient: Cassandra Campos    Procedure(s) Performed: Procedure(s) (LRB):  RELEASE, HAND, FOR DEQUERVAIN'S TENOSYNOVITIS (Right)    Final Anesthesia Type: MAC    Patient location during evaluation: PACU  Patient participation: Yes- Able to Participate  Level of consciousness: awake and alert  Post-procedure vital signs: reviewed and stable  Pain management: adequate  Airway patency: patent    PONV status at discharge: No PONV  Anesthetic complications: no      Cardiovascular status: blood pressure returned to baseline  Respiratory status: unassisted  Hydration status: euvolemic  Follow-up not needed.          Vitals Value Taken Time   /68 11/20/20 1015   Temp 36.7 °C (98 °F) 11/20/20 1015   Pulse 95 11/20/20 1015   Resp 18 11/20/20 0945   SpO2 95 % 11/20/20 1015         Event Time   Out of Recovery 10:19:25         Pain/Francheska Score: No data recorded

## 2020-11-25 ENCOUNTER — OFFICE VISIT (OUTPATIENT)
Dept: ORTHOPEDICS | Facility: CLINIC | Age: 70
End: 2020-11-25
Payer: MEDICARE

## 2020-11-25 ENCOUNTER — PATIENT MESSAGE (OUTPATIENT)
Dept: INTERNAL MEDICINE | Facility: CLINIC | Age: 70
End: 2020-11-25

## 2020-11-25 VITALS
BODY MASS INDEX: 35.75 KG/M2 | HEART RATE: 104 BPM | HEIGHT: 63 IN | WEIGHT: 201.75 LBS | SYSTOLIC BLOOD PRESSURE: 130 MMHG | TEMPERATURE: 98 F | DIASTOLIC BLOOD PRESSURE: 89 MMHG

## 2020-11-25 DIAGNOSIS — M65.4 TENOSYNOVITIS, DE QUERVAIN: Primary | ICD-10-CM

## 2020-11-25 DIAGNOSIS — G47.00 INSOMNIA, UNSPECIFIED TYPE: ICD-10-CM

## 2020-11-25 DIAGNOSIS — F43.0 PANIC ATTACK AS REACTION TO STRESS: ICD-10-CM

## 2020-11-25 DIAGNOSIS — F41.0 PANIC ATTACK AS REACTION TO STRESS: ICD-10-CM

## 2020-11-25 PROCEDURE — 3288F PR FALLS RISK ASSESSMENT DOCUMENTED: ICD-10-PCS | Mod: HCNC,CPTII,S$GLB, | Performed by: ORTHOPAEDIC SURGERY

## 2020-11-25 PROCEDURE — 1101F PR PT FALLS ASSESS DOC 0-1 FALLS W/OUT INJ PAST YR: ICD-10-PCS | Mod: HCNC,CPTII,S$GLB, | Performed by: ORTHOPAEDIC SURGERY

## 2020-11-25 PROCEDURE — 99999 PR PBB SHADOW E&M-EST. PATIENT-LVL IV: ICD-10-PCS | Mod: PBBFAC,HCNC,, | Performed by: ORTHOPAEDIC SURGERY

## 2020-11-25 PROCEDURE — 3008F BODY MASS INDEX DOCD: CPT | Mod: HCNC,CPTII,S$GLB, | Performed by: ORTHOPAEDIC SURGERY

## 2020-11-25 PROCEDURE — 3008F PR BODY MASS INDEX (BMI) DOCUMENTED: ICD-10-PCS | Mod: HCNC,CPTII,S$GLB, | Performed by: ORTHOPAEDIC SURGERY

## 2020-11-25 PROCEDURE — 99024 POSTOP FOLLOW-UP VISIT: CPT | Mod: HCNC,S$GLB,, | Performed by: ORTHOPAEDIC SURGERY

## 2020-11-25 PROCEDURE — 99999 PR PBB SHADOW E&M-EST. PATIENT-LVL IV: CPT | Mod: PBBFAC,HCNC,, | Performed by: ORTHOPAEDIC SURGERY

## 2020-11-25 PROCEDURE — 3288F FALL RISK ASSESSMENT DOCD: CPT | Mod: HCNC,CPTII,S$GLB, | Performed by: ORTHOPAEDIC SURGERY

## 2020-11-25 PROCEDURE — 1125F AMNT PAIN NOTED PAIN PRSNT: CPT | Mod: HCNC,S$GLB,, | Performed by: ORTHOPAEDIC SURGERY

## 2020-11-25 PROCEDURE — 1125F PR PAIN SEVERITY QUANTIFIED, PAIN PRESENT: ICD-10-PCS | Mod: HCNC,S$GLB,, | Performed by: ORTHOPAEDIC SURGERY

## 2020-11-25 PROCEDURE — 99024 PR POST-OP FOLLOW-UP VISIT: ICD-10-PCS | Mod: HCNC,S$GLB,, | Performed by: ORTHOPAEDIC SURGERY

## 2020-11-25 PROCEDURE — 1101F PT FALLS ASSESS-DOCD LE1/YR: CPT | Mod: HCNC,CPTII,S$GLB, | Performed by: ORTHOPAEDIC SURGERY

## 2020-11-25 RX ORDER — A/SINGAPORE/GP1908/2015 IVR-180 (AN A/MICHIGAN/45/2015 (H1N1)PDM09-LIKE VIRUS, A/HONG KONG/4801/2014, NYMC X-263B (H3N2) (AN A/HONG KONG/4801/2014-LIKE VIRUS), AND B/BRISBANE/60/2008, WILD TYPE (A B/BRISBANE/60/2008-LIKE VIRUS) 15; 15; 15 UG/.5ML; UG/.5ML; UG/.5ML
INJECTION, SUSPENSION INTRAMUSCULAR
COMMUNITY
Start: 2020-09-03 | End: 2021-01-24 | Stop reason: ALTCHOICE

## 2020-11-25 RX ORDER — HYDROCODONE BITARTRATE AND ACETAMINOPHEN 10; 325 MG/1; MG/1
1 TABLET ORAL EVERY 6 HOURS PRN
Qty: 15 TABLET | Refills: 0 | Status: SHIPPED | OUTPATIENT
Start: 2020-11-25 | End: 2021-03-18

## 2020-11-25 RX ORDER — PNEUMOCOCCAL 13-VALENT CONJUGATE VACCINE 2.2; 2.2; 2.2; 2.2; 2.2; 4.4; 2.2; 2.2; 2.2; 2.2; 2.2; 2.2; 2.2 UG/.5ML; UG/.5ML; UG/.5ML; UG/.5ML; UG/.5ML; UG/.5ML; UG/.5ML; UG/.5ML; UG/.5ML; UG/.5ML; UG/.5ML; UG/.5ML; UG/.5ML
INJECTION, SUSPENSION INTRAMUSCULAR
COMMUNITY
Start: 2020-09-03 | End: 2022-03-01

## 2020-11-25 NOTE — PROGRESS NOTES
Subjective:     Patient ID: Cassandra Campos is a 70 y.o. female.    Chief Complaint: Post-op Evaluation and Pain of the Right Hand    The patient is 70-year-old female status post right de Quervain release date of surgery was 2020.  She seems to be doing well.      Past Medical History:   Diagnosis Date    Arthritis     hands    Bilateral bunions     Borderline glaucoma     De Quervain's disease (radial styloid tenosynovitis)     Gastritis     upper GI 2017    Hydradenitis     Hyperlipidemia     Hypertension     Insomnia     Migraines 2000    Nasal septum perforation     Obesity     Pneumonia     Restrictive airway disease     Sleep apnea     SVT (supraventricular tachycardia) 2013    Trigger finger     Type 2 diabetes mellitus      Past Surgical History:   Procedure Laterality Date    AXILLARY HIDRADENITIS EXCISION Bilateral     BONE EXOSTOSIS EXCISION Right 2018    Procedure: EXCISION, EXOSTOSIS;  Surgeon: Jayro Pedraza Sr., MD;  Location: [facility];  Service: Orthopedics;  Laterality: Right;    BREAST BIOPSY Bilateral     BREAST LUMPECTOMY Bilateral 1998    Benign    CARPAL TUNNEL RELEASE      bilateral    CATARACT EXTRACTION Bilateral     OU     SECTION  1979    CHOLECYSTECTOMY  2014    COLONOSCOPY N/A 10/2/2020    Procedure: COLONOSCOPY;  Surgeon: Tushar Edwards MD;  Location: [facility];  Service: Endoscopy;  Laterality: N/A;    COLONOSCOPY W/ POLYPECTOMY  10/02/2020    Polyps x3, repeat 5 years; Tushar Edwards MD     CYST REMOVAL  2015    sebaceous cyst removed from face    DE QUERVAIN'S RELEASE Left 2020    Procedure: RELEASE, HAND, FOR DEQUERVAIN'S TENOSYNOVITIS;  Surgeon: Jaime Oswald MD;  Location: [facility] OR;  Service: Orthopedics;  Laterality: Left;    DE QUERVAIN'S RELEASE Right 2020    Procedure: RELEASE, HAND, FOR DEQUERVAIN'S TENOSYNOVITIS;  Surgeon: Jaime Oswald MD;  Location: [facility] OR;  Service:  Orthopedics;  Laterality: Right;    gastric sleeve  2017    Dr. Watson    KNEE SURGERY Right     OLECRANON BURSECTOMY Right 2018    Procedure: BURSECTOMY, OLECRANON;  Surgeon: Jayro Pedraza Sr., MD;  Location: [facility] OR;  Service: Orthopedics;  Laterality: Right;    SURGICAL REMOVAL OF BUNION WITH OSTEOTOMY OF METATARSAL BONE Left 5/10/2019    Procedure: BUNIONECTOMY, WITH METATARSAL OSTEOTOMY;  Surgeon: Srinivasan Villanueva DPM;  Location: [facility] OR;  Service: Podiatry;  Laterality: Left;    SURGICAL REMOVAL OF BUNION WITH OSTEOTOMY OF METATARSAL BONE Right 2019    Procedure: BUNIONECTOMY, WITH METATARSAL OSTEOTOMY;  Surgeon: Srinivasan Villanueva DPM;  Location: [facility] OR;  Service: Podiatry;  Laterality: Right;    TONSILLECTOMY, ADENOIDECTOMY  1980s    TRIGGER FINGER RELEASE Right 2015    Dr. Pedraza     Family History   Problem Relation Age of Onset    Prostate cancer Brother     Diabetes Maternal Aunt      Social History     Socioeconomic History    Marital status:      Spouse name: Not on file    Number of children: 1    Years of education: Not on file    Highest education level: Not on file   Occupational History    Occupation:  aid   Social Needs    Financial resource strain: Not very hard    Food insecurity     Worry: Sometimes true     Inability: Sometimes true    Transportation needs     Medical: No     Non-medical: No   Tobacco Use    Smoking status: Former Smoker     Packs/day: 0.50     Years: 30.00     Pack years: 15.00     Types: Cigarettes     Start date: 1970     Quit date: 2012     Years since quittin.9    Smokeless tobacco: Never Used   Substance and Sexual Activity    Alcohol use: Yes     Frequency: Never     Drinks per session: 1 or 2     Binge frequency: Never     Comment: rarely / No alcohol prior to surgery    Drug use: No    Sexual activity: Not Currently     Partners: Male   Lifestyle    Physical activity     Days per week:  5 days     Minutes per session: 90 min    Stress: To some extent   Relationships    Social connections     Talks on phone: More than three times a week     Gets together: Once a week     Attends Spiritism service: More than 4 times per year     Active member of club or organization: Yes     Attends meetings of clubs or organizations: More than 4 times per year     Relationship status:    Other Topics Concern    Not on file   Social History Narrative    Single, part-time teacher. Masters degree biology.      Medication List with Changes/Refills   Current Medications    ACCU-CHEK FASTCLIX MISC    USE ONE TIME DAILY    ACCU-CHEK SMARTVIEW TEST STRIP STRP    TEST ONE TIME DAILY    ALBUTEROL (PROVENTIL/VENTOLIN HFA) 90 MCG/ACTUATION INHALER    INHALE 2 PUFFS INTO THE LUNGS EVERY 4 TO 6 HOURS AS NEEDED FOR WHEEZING OR SHORTNESS OF BREATH.    AMITRIPTYLINE (ELAVIL) 100 MG TABLET    Take 1 tablet (100 mg total) by mouth nightly.    BLOOD-GLUCOSE METER KIT    Use as instructed    ESZOPICLONE (LUNESTA) 3 MG TAB    TAKE 1 TABLET EVERY NIGHT AS NEEDED FOR SLEEP    FLUAD QUAD 2020-21,65Y UP,,PF, 60 MCG (15 MCG X 4)/0.5 ML SYRG    PHARMACY ADMINISTERED    HYDROCODONE-ACETAMINOPHEN (NORCO)  MG PER TABLET    Take 1 tablet by mouth every 6 (six) hours as needed for Pain.    HYDROCODONE-ACETAMINOPHEN (NORCO) 5-325 MG PER TABLET    Take 1 tablet by mouth every 6 (six) hours as needed for Pain.    LANCETS MISC    1 each by Misc.(Non-Drug; Combo Route) route once daily.    LORAZEPAM (ATIVAN) 0.5 MG TABLET    Take 1 tablet (0.5 mg total) by mouth 2 (two) times daily as needed for Anxiety.    METFORMIN (GLUCOPHAGE-XR) 500 MG ER 24HR TABLET    Take 2 tablets (1,000 mg total) by mouth once daily.    METOPROLOL SUCCINATE (TOPROL-XL) 50 MG 24 HR TABLET    TAKE 1 TABLET EVERY DAY    OMEPRAZOLE (PRILOSEC) 20 MG CAPSULE    TAKE 1 CAPSULE EVERY DAY    PRAVASTATIN (PRAVACHOL) 40 MG TABLET    TAKE 1 TABLET BY MOUTH EVERY DAY AT  NIGHT    PREVNAR 13, PF, 0.5 ML SYRG         Review of patient's allergies indicates:  No Known Allergies  Review of Systems   Constitution: Negative for malaise/fatigue.   HENT: Negative for hearing loss.    Eyes: Negative for double vision and visual disturbance.   Cardiovascular: Negative for chest pain.   Respiratory: Negative for shortness of breath.    Endocrine: Negative for cold intolerance.   Hematologic/Lymphatic: Does not bruise/bleed easily.   Skin: Negative for poor wound healing and suspicious lesions.   Musculoskeletal: Positive for arthritis, joint pain and joint swelling. Negative for gout.   Gastrointestinal: Positive for heartburn. Negative for nausea and vomiting.   Genitourinary: Negative for dysuria.   Neurological: Negative for numbness, paresthesias and sensory change.   Psychiatric/Behavioral: Negative for depression, memory loss and substance abuse. The patient is not nervous/anxious.    Allergic/Immunologic: Negative for persistent infections.       Objective:   Body mass index is 35.73 kg/m².  Vitals:    11/25/20 1432   BP: 130/89   Pulse: 104   Temp: 97.8 °F (36.6 °C)                General    Constitutional: She is oriented to person, place, and time. She appears well-developed and well-nourished. No distress.   HENT:   Head: Normocephalic.   Eyes: EOM are normal.   Neck: Normal range of motion.   Pulmonary/Chest: Effort normal.   Neurological: She is oriented to person, place, and time.   Psychiatric: She has a normal mood and affect.             Right Hand/Wrist Exam     Inspection   Scars: Wrist - present Hand -  present  Effusion: Wrist - absent Hand -  absent    Pain   Wrist - The patient exhibits pain of the extensory musculature.    Other     Neuorologic Exam    Median Distribution: normal  Ulnar Distribution: normal  Radial Distribution: normal    Comments:  The de Quervain incision right wrist looks good.  The suture lines intact.  There is no sign of infection.  There are no  motor or sensory deficits.          Vascular Exam       Capillary Refill  Right Hand: normal capillary refill      Relevant imaging results reviewed and interpreted by me, discussed with the patient and / or family today radiographs were not obtained today  Assessment:     Encounter Diagnosis   Name Primary?    Tenosynovitis, de Quervain Yes      Status post right de Quervain release  Plan:       The patient had a Band-Aid applied.  Wound care was discussed.  She will return in 1 week for suture removal.  She is given Norco 10 mg 15.  One p.o. t.i.d. p.r.n. pain              Disclaimer: This note was prepared using a voice recognition system and is likely to have sound alike errors within the text.

## 2020-11-30 ENCOUNTER — PATIENT MESSAGE (OUTPATIENT)
Dept: INTERNAL MEDICINE | Facility: CLINIC | Age: 70
End: 2020-11-30

## 2020-11-30 DIAGNOSIS — G47.00 INSOMNIA, UNSPECIFIED TYPE: ICD-10-CM

## 2020-11-30 RX ORDER — ESZOPICLONE 3 MG/1
TABLET, FILM COATED ORAL
Qty: 90 TABLET | Refills: 1 | Status: CANCELLED | OUTPATIENT
Start: 2020-11-30

## 2020-11-30 RX ORDER — OMEPRAZOLE 20 MG/1
20 CAPSULE, DELAYED RELEASE ORAL DAILY
Qty: 90 CAPSULE | Refills: 1 | Status: CANCELLED | OUTPATIENT
Start: 2020-11-30

## 2020-12-01 ENCOUNTER — TELEPHONE (OUTPATIENT)
Dept: ORTHOPEDICS | Facility: CLINIC | Age: 70
End: 2020-12-01

## 2020-12-01 ENCOUNTER — PATIENT MESSAGE (OUTPATIENT)
Dept: ORTHOPEDICS | Facility: CLINIC | Age: 70
End: 2020-12-01

## 2020-12-01 NOTE — TELEPHONE ENCOUNTER
Spoke with patient and she will come in for 1:30pm due to transportation issues she needs to come in tomorrow afternoon.

## 2020-12-01 NOTE — TELEPHONE ENCOUNTER
Called the patient in regards to their appointment on 12/2/20. Called the patient to reschedule their appointment due to the fact that Carlita Hammond will be out of office on that day. Left a message for the patient to give the office a call back. FP

## 2020-12-01 NOTE — TELEPHONE ENCOUNTER
Called the patient in regards to their appointment on 12/2/20. Called the patient to reschedule their appointment due to the fact that Carlita Renae will be out of the office on that day and time. Left a message for the patient to give the office a call back.FP

## 2020-12-01 NOTE — TELEPHONE ENCOUNTER
----- Message from Georgina Wilkinson sent at 12/1/2020  2:44 PM CST -----  Regarding: post op appt  Good afternoon,      Pt missed call about post op appt. She is supposed to get stitches out and would like a call back at [phone]      Thanks,  Georgina Wilkinson

## 2020-12-02 ENCOUNTER — OFFICE VISIT (OUTPATIENT)
Dept: ORTHOPEDICS | Facility: CLINIC | Age: 70
End: 2020-12-02
Payer: MEDICARE

## 2020-12-02 VITALS
BODY MASS INDEX: 35.75 KG/M2 | HEIGHT: 63 IN | HEART RATE: 110 BPM | SYSTOLIC BLOOD PRESSURE: 134 MMHG | DIASTOLIC BLOOD PRESSURE: 85 MMHG | TEMPERATURE: 98 F | WEIGHT: 201.75 LBS

## 2020-12-02 DIAGNOSIS — Z09 POSTOP CHECK: ICD-10-CM

## 2020-12-02 DIAGNOSIS — M65.4 TENOSYNOVITIS, DE QUERVAIN: Primary | ICD-10-CM

## 2020-12-02 PROCEDURE — 3288F FALL RISK ASSESSMENT DOCD: CPT | Mod: HCNC,CPTII,S$GLB, | Performed by: PHYSICIAN ASSISTANT

## 2020-12-02 PROCEDURE — 99024 POSTOP FOLLOW-UP VISIT: CPT | Mod: HCNC,S$GLB,, | Performed by: PHYSICIAN ASSISTANT

## 2020-12-02 PROCEDURE — 3288F PR FALLS RISK ASSESSMENT DOCUMENTED: ICD-10-PCS | Mod: HCNC,CPTII,S$GLB, | Performed by: PHYSICIAN ASSISTANT

## 2020-12-02 PROCEDURE — 1101F PT FALLS ASSESS-DOCD LE1/YR: CPT | Mod: HCNC,CPTII,S$GLB, | Performed by: PHYSICIAN ASSISTANT

## 2020-12-02 PROCEDURE — 3008F PR BODY MASS INDEX (BMI) DOCUMENTED: ICD-10-PCS | Mod: HCNC,CPTII,S$GLB, | Performed by: PHYSICIAN ASSISTANT

## 2020-12-02 PROCEDURE — 99999 PR PBB SHADOW E&M-EST. PATIENT-LVL IV: CPT | Mod: PBBFAC,HCNC,, | Performed by: PHYSICIAN ASSISTANT

## 2020-12-02 PROCEDURE — 1101F PR PT FALLS ASSESS DOC 0-1 FALLS W/OUT INJ PAST YR: ICD-10-PCS | Mod: HCNC,CPTII,S$GLB, | Performed by: PHYSICIAN ASSISTANT

## 2020-12-02 PROCEDURE — 99024 PR POST-OP FOLLOW-UP VISIT: ICD-10-PCS | Mod: HCNC,S$GLB,, | Performed by: PHYSICIAN ASSISTANT

## 2020-12-02 PROCEDURE — 3008F BODY MASS INDEX DOCD: CPT | Mod: HCNC,CPTII,S$GLB, | Performed by: PHYSICIAN ASSISTANT

## 2020-12-02 PROCEDURE — 1125F AMNT PAIN NOTED PAIN PRSNT: CPT | Mod: HCNC,S$GLB,, | Performed by: PHYSICIAN ASSISTANT

## 2020-12-02 PROCEDURE — 1125F PR PAIN SEVERITY QUANTIFIED, PAIN PRESENT: ICD-10-PCS | Mod: HCNC,S$GLB,, | Performed by: PHYSICIAN ASSISTANT

## 2020-12-02 PROCEDURE — 99999 PR PBB SHADOW E&M-EST. PATIENT-LVL IV: ICD-10-PCS | Mod: PBBFAC,HCNC,, | Performed by: PHYSICIAN ASSISTANT

## 2020-12-02 RX ORDER — ESZOPICLONE 3 MG/1
TABLET, FILM COATED ORAL
Qty: 90 TABLET | Refills: 1 | Status: SHIPPED | OUTPATIENT
Start: 2020-12-02 | End: 2021-05-17 | Stop reason: SDUPTHER

## 2020-12-02 RX ORDER — LORAZEPAM 0.5 MG/1
0.5 TABLET ORAL 2 TIMES DAILY PRN
Qty: 25 TABLET | Refills: 0 | OUTPATIENT
Start: 2020-12-02 | End: 2021-01-01

## 2020-12-02 RX ORDER — OMEPRAZOLE 20 MG/1
20 CAPSULE, DELAYED RELEASE ORAL DAILY
Qty: 90 CAPSULE | Refills: 1 | Status: SHIPPED | OUTPATIENT
Start: 2020-12-02 | End: 2021-04-27

## 2020-12-02 NOTE — PROGRESS NOTES
Patient ID: Cassandra Campos is a 70 y.o. female.    Chief Complaint: No chief complaint on file.      HPI: Cassandra Campos  is a 70 y.o. female who c/o No chief complaint on file.   for duration of 2 weeks.  She underwent right de Quervain release by Dr. Oswald.  Patient states pain is much improved.  She rates it a 7/10, but states she really does not have any functional limitations and minimal pain to speak of.  Quality is intermittent aching pain.  Alleviating factors include ice and rest.  Also improved with Advil.  She occasionally takes Norco at night go to bed.  Aggravating factors include weight-bearing.    Procedure: Right wrist de Quervain release  DOS:  2020    Past Medical History:   Diagnosis Date    Arthritis     hands    Bilateral bunions     Borderline glaucoma     De Quervain's disease (radial styloid tenosynovitis)     Gastritis     upper GI 2017    Hydradenitis     Hyperlipidemia     Hypertension     Insomnia     Migraines 2000    Nasal septum perforation     Obesity     Pneumonia     Restrictive airway disease     Sleep apnea     SVT (supraventricular tachycardia) 2013    Trigger finger     Type 2 diabetes mellitus      Past Surgical History:   Procedure Laterality Date    AXILLARY HIDRADENITIS EXCISION Bilateral     BONE EXOSTOSIS EXCISION Right 2018    Procedure: EXCISION, EXOSTOSIS;  Surgeon: Jayro Pedraza Sr., MD;  Location: [facility] OR;  Service: Orthopedics;  Laterality: Right;    BREAST BIOPSY Bilateral     BREAST LUMPECTOMY Bilateral 1998    Benign    CARPAL TUNNEL RELEASE      bilateral    CATARACT EXTRACTION Bilateral     OU     SECTION  1979    CHOLECYSTECTOMY  2014    COLONOSCOPY N/A 10/2/2020    Procedure: COLONOSCOPY;  Surgeon: Tushar Edwards MD;  Location: [facility];  Service: Endoscopy;  Laterality: N/A;    COLONOSCOPY W/ POLYPECTOMY  10/02/2020    Polyps x3, repeat 5 years; Tushar Edwards MD     CYST REMOVAL   july 2015    sebaceous cyst removed from face    DE QUERVAIN'S RELEASE Left 1/16/2020    Procedure: RELEASE, HAND, FOR DEQUERVAIN'S TENOSYNOVITIS;  Surgeon: Jaime Oswald MD;  Location: [facility];  Service: Orthopedics;  Laterality: Left;    DE QUERVAIN'S RELEASE Right 11/20/2020    Procedure: RELEASE, HAND, FOR DEQUERVAIN'S TENOSYNOVITIS;  Surgeon: Jaime Oswald MD;  Location: [facility];  Service: Orthopedics;  Laterality: Right;    gastric sleeve  02/13/2017    Dr. Watson    KNEE SURGERY Right     OLECRANON BURSECTOMY Right 7/25/2018    Procedure: BURSECTOMY, OLECRANON;  Surgeon: Jayro Pedraza Sr., MD;  Location: [facility];  Service: Orthopedics;  Laterality: Right;    SURGICAL REMOVAL OF BUNION WITH OSTEOTOMY OF METATARSAL BONE Left 5/10/2019    Procedure: BUNIONECTOMY, WITH METATARSAL OSTEOTOMY;  Surgeon: Srinivasan Villanueva DPM;  Location: [facility];  Service: Podiatry;  Laterality: Left;    SURGICAL REMOVAL OF BUNION WITH OSTEOTOMY OF METATARSAL BONE Right 6/28/2019    Procedure: BUNIONECTOMY, WITH METATARSAL OSTEOTOMY;  Surgeon: Srinivasan Villanueva DPM;  Location: [facility];  Service: Podiatry;  Laterality: Right;    TONSILLECTOMY, ADENOIDECTOMY  1980s    TRIGGER FINGER RELEASE Right april 1, 2015    Dr. Pedraza     Family History   Problem Relation Age of Onset    Prostate cancer Brother     Diabetes Maternal Aunt      Social History     Socioeconomic History    Marital status:      Spouse name: Not on file    Number of children: 1    Years of education: Not on file    Highest education level: Not on file   Occupational History    Occupation:  aid   Social Needs    Financial resource strain: Not very hard    Food insecurity     Worry: Sometimes true     Inability: Sometimes true    Transportation needs     Medical: No     Non-medical: No   Tobacco Use    Smoking status: Former Smoker     Packs/day: 0.50     Years: 30.00     Pack years: 15.00     Types: Cigarettes      Start date: 1970     Quit date: 2012     Years since quittin.9    Smokeless tobacco: Never Used   Substance and Sexual Activity    Alcohol use: Yes     Frequency: Never     Drinks per session: 1 or 2     Binge frequency: Never     Comment: rarely / No alcohol prior to surgery    Drug use: No    Sexual activity: Not Currently     Partners: Male   Lifestyle    Physical activity     Days per week: 5 days     Minutes per session: 90 min    Stress: To some extent   Relationships    Social connections     Talks on phone: More than three times a week     Gets together: Once a week     Attends Confucianism service: More than 4 times per year     Active member of club or organization: Yes     Attends meetings of clubs or organizations: More than 4 times per year     Relationship status:    Other Topics Concern    Not on file   Social History Narrative    Single, part-time teacher. Masters degree biology.      Medication List with Changes/Refills   Current Medications    ACCU-CHEK FASTCLIX MISC    USE ONE TIME DAILY    ACCU-CHEK SMARTVIEW TEST STRIP STRP    TEST ONE TIME DAILY    ALBUTEROL (PROVENTIL/VENTOLIN HFA) 90 MCG/ACTUATION INHALER    INHALE 2 PUFFS INTO THE LUNGS EVERY 4 TO 6 HOURS AS NEEDED FOR WHEEZING OR SHORTNESS OF BREATH.    AMITRIPTYLINE (ELAVIL) 100 MG TABLET    Take 1 tablet (100 mg total) by mouth nightly.    BLOOD-GLUCOSE METER KIT    Use as instructed    ESZOPICLONE (LUNESTA) 3 MG TAB    TAKE 1 TABLET EVERY NIGHT AS NEEDED FOR SLEEP    FLUAD QUAD 2020-21,65Y UP,,PF, 60 MCG (15 MCG X 4)/0.5 ML SYRG    PHARMACY ADMINISTERED    HYDROCODONE-ACETAMINOPHEN (NORCO)  MG PER TABLET    Take 1 tablet by mouth every 6 (six) hours as needed for Pain.    LANCETS MISC    1 each by Misc.(Non-Drug; Combo Route) route once daily.    LORAZEPAM (ATIVAN) 0.5 MG TABLET    TAKE 1 TABLET (0.5 MG TOTAL) BY MOUTH 2 (TWO) TIMES DAILY AS NEEDED FOR ANXIETY.    METFORMIN (GLUCOPHAGE-XR) 500 MG ER  24HR TABLET    Take 2 tablets (1,000 mg total) by mouth once daily.    METOPROLOL SUCCINATE (TOPROL-XL) 50 MG 24 HR TABLET    TAKE 1 TABLET EVERY DAY    OMEPRAZOLE (PRILOSEC) 20 MG CAPSULE    Take 1 capsule (20 mg total) by mouth once daily.    PRAVASTATIN (PRAVACHOL) 40 MG TABLET    TAKE 1 TABLET BY MOUTH EVERY DAY AT NIGHT    PREVNAR 13, PF, 0.5 ML SYRG         Review of patient's allergies indicates:  No Known Allergies    Objective:     Right Hand  2+ rad pulse  Comp soft  Sensation intact  Inc C/D/I  No SOI  Cap refill < 2 sec  Motor intact to hand and wrist    Assessment:       Encounter Diagnoses   Name Primary?    Tenosynovitis, de Quervain Yes    Postop check           Plan:       Diagnoses and all orders for this visit:    Tenosynovitis, de Quervain    Postop check        Cassandra Maria Campos is an established pt here for postop follow-up after surgery as above by Dr. Oswald.  She is doing well.  She is happy with her progress.  She has minimal discomfort.  Clean the incision with peroxide and removed all sutures.  I applied suture strips across the incision.  She may get the incision wet with clean running water and antibacterial soap.  She will avoid submerging in standing water for another 2 weeks.  I have offered to see her back 1 last time in about a month for final recheck.  She has declined that stating she will call if she has any problems.  She verbalized understanding and agrees.    Follow up if symptoms worsen or fail to improve - per pt request.    The patient understands, chooses and consents to this plan and accepts all   the risks which include but are not limited to the risks mentioned above.     Disclaimer: This note was prepared using a voice recognition system and is likely to have sound alike errors within the text.

## 2020-12-09 ENCOUNTER — PATIENT OUTREACH (OUTPATIENT)
Dept: ADMINISTRATIVE | Facility: HOSPITAL | Age: 70
End: 2020-12-09

## 2020-12-22 ENCOUNTER — PATIENT MESSAGE (OUTPATIENT)
Dept: PODIATRY | Facility: CLINIC | Age: 70
End: 2020-12-22

## 2020-12-22 ENCOUNTER — PATIENT MESSAGE (OUTPATIENT)
Dept: INTERNAL MEDICINE | Facility: CLINIC | Age: 70
End: 2020-12-22

## 2020-12-22 DIAGNOSIS — F43.0 PANIC ATTACK AS REACTION TO STRESS: ICD-10-CM

## 2020-12-22 DIAGNOSIS — F41.0 PANIC ATTACK AS REACTION TO STRESS: ICD-10-CM

## 2020-12-22 RX ORDER — LORAZEPAM 0.5 MG/1
0.5 TABLET ORAL 2 TIMES DAILY PRN
Qty: 25 TABLET | Refills: 0 | Status: SHIPPED | OUTPATIENT
Start: 2020-12-22 | End: 2021-01-11 | Stop reason: SDUPTHER

## 2021-01-06 ENCOUNTER — PATIENT MESSAGE (OUTPATIENT)
Dept: PODIATRY | Facility: CLINIC | Age: 71
End: 2021-01-06

## 2021-01-10 ENCOUNTER — IMMUNIZATION (OUTPATIENT)
Dept: INTERNAL MEDICINE | Facility: CLINIC | Age: 71
End: 2021-01-10
Payer: MEDICARE

## 2021-01-10 DIAGNOSIS — Z23 NEED FOR VACCINATION: ICD-10-CM

## 2021-01-10 PROCEDURE — 91300 COVID-19, MRNA, LNP-S, PF, 30 MCG/0.3 ML DOSE VACCINE: CPT | Mod: HCNC,PBBFAC | Performed by: FAMILY MEDICINE

## 2021-01-13 ENCOUNTER — PATIENT MESSAGE (OUTPATIENT)
Dept: INTERNAL MEDICINE | Facility: CLINIC | Age: 71
End: 2021-01-13

## 2021-01-14 ENCOUNTER — HOSPITAL ENCOUNTER (OUTPATIENT)
Dept: RADIOLOGY | Facility: HOSPITAL | Age: 71
Discharge: HOME | End: 2021-01-14
Attending: FAMILY MEDICINE
Payer: MEDICARE

## 2021-01-14 DIAGNOSIS — Z12.31 ENCOUNTER FOR SCREENING MAMMOGRAM FOR BREAST CANCER: ICD-10-CM

## 2021-01-14 PROCEDURE — 77067 MAMMO DIGITAL SCREENING BILAT WITH TOMOSYNTHESIS_CAD: ICD-10-PCS | Mod: 26,HCNC,, | Performed by: RADIOLOGY

## 2021-01-14 PROCEDURE — 77067 SCR MAMMO BI INCL CAD: CPT | Mod: TC,HCNC

## 2021-01-14 PROCEDURE — 77063 MAMMO DIGITAL SCREENING BILAT WITH TOMOSYNTHESIS_CAD: ICD-10-PCS | Mod: 26,HCNC,, | Performed by: RADIOLOGY

## 2021-01-14 PROCEDURE — 77063 BREAST TOMOSYNTHESIS BI: CPT | Mod: 26,HCNC,, | Performed by: RADIOLOGY

## 2021-01-14 PROCEDURE — 77067 SCR MAMMO BI INCL CAD: CPT | Mod: 26,HCNC,, | Performed by: RADIOLOGY

## 2021-01-21 ENCOUNTER — OFFICE VISIT (OUTPATIENT)
Dept: PODIATRY | Facility: CLINIC | Age: 71
End: 2021-01-21
Payer: MEDICARE

## 2021-01-21 ENCOUNTER — PATIENT MESSAGE (OUTPATIENT)
Dept: INTERNAL MEDICINE | Facility: CLINIC | Age: 71
End: 2021-01-21

## 2021-01-21 ENCOUNTER — HOSPITAL ENCOUNTER (OUTPATIENT)
Dept: RADIOLOGY | Facility: HOSPITAL | Age: 71
Discharge: HOME | End: 2021-01-21
Attending: PODIATRIST
Payer: MEDICARE

## 2021-01-21 ENCOUNTER — OFFICE VISIT (OUTPATIENT)
Dept: INTERNAL MEDICINE | Facility: CLINIC | Age: 71
End: 2021-01-21
Payer: MEDICARE

## 2021-01-21 VITALS
BODY MASS INDEX: 35.9 KG/M2 | DIASTOLIC BLOOD PRESSURE: 76 MMHG | WEIGHT: 202.63 LBS | TEMPERATURE: 98 F | OXYGEN SATURATION: 94 % | HEART RATE: 96 BPM | HEIGHT: 63 IN | SYSTOLIC BLOOD PRESSURE: 128 MMHG

## 2021-01-21 VITALS
HEIGHT: 63 IN | WEIGHT: 202.63 LBS | DIASTOLIC BLOOD PRESSURE: 76 MMHG | SYSTOLIC BLOOD PRESSURE: 128 MMHG | HEART RATE: 96 BPM | BODY MASS INDEX: 35.9 KG/M2

## 2021-01-21 DIAGNOSIS — F43.0 STRESS REACTION: ICD-10-CM

## 2021-01-21 DIAGNOSIS — B35.1 ONYCHOMYCOSIS OF MULTIPLE TOENAILS WITH TYPE 2 DIABETES MELLITUS: ICD-10-CM

## 2021-01-21 DIAGNOSIS — F41.0 PANIC ATTACK AS REACTION TO STRESS: ICD-10-CM

## 2021-01-21 DIAGNOSIS — R69 TAKING MEDICATION FOR CHRONIC DISEASE: ICD-10-CM

## 2021-01-21 DIAGNOSIS — E11.69 ONYCHOMYCOSIS OF MULTIPLE TOENAILS WITH TYPE 2 DIABETES MELLITUS: ICD-10-CM

## 2021-01-21 DIAGNOSIS — M79.675 PAIN OF LEFT GREAT TOE: ICD-10-CM

## 2021-01-21 DIAGNOSIS — E11.9 COMPREHENSIVE DIABETIC FOOT EXAMINATION, TYPE 2 DM, ENCOUNTER FOR: Primary | ICD-10-CM

## 2021-01-21 DIAGNOSIS — E66.01 SEVERE OBESITY (BMI 35.0-39.9) WITH COMORBIDITY: ICD-10-CM

## 2021-01-21 DIAGNOSIS — Z98.890 HISTORY OF BUNIONECTOMY OF LEFT GREAT TOE: ICD-10-CM

## 2021-01-21 DIAGNOSIS — E78.5 HYPERLIPIDEMIA ASSOCIATED WITH TYPE 2 DIABETES MELLITUS: ICD-10-CM

## 2021-01-21 DIAGNOSIS — I10 ESSENTIAL HYPERTENSION: ICD-10-CM

## 2021-01-21 DIAGNOSIS — M19.072 OSTEOARTHRITIS OF LEFT ANKLE OR FOOT: ICD-10-CM

## 2021-01-21 DIAGNOSIS — M79.675 PAIN IN TOES OF BOTH FEET: ICD-10-CM

## 2021-01-21 DIAGNOSIS — E11.69 HYPERLIPIDEMIA ASSOCIATED WITH TYPE 2 DIABETES MELLITUS: ICD-10-CM

## 2021-01-21 DIAGNOSIS — F43.0 PANIC ATTACK AS REACTION TO STRESS: ICD-10-CM

## 2021-01-21 DIAGNOSIS — E11.9 CONTROLLED TYPE 2 DIABETES MELLITUS WITHOUT COMPLICATION, WITHOUT LONG-TERM CURRENT USE OF INSULIN: ICD-10-CM

## 2021-01-21 DIAGNOSIS — E11.9 CONTROLLED TYPE 2 DIABETES MELLITUS WITHOUT COMPLICATION, WITHOUT LONG-TERM CURRENT USE OF INSULIN: Primary | ICD-10-CM

## 2021-01-21 DIAGNOSIS — M79.674 PAIN IN TOES OF BOTH FEET: ICD-10-CM

## 2021-01-21 PROCEDURE — 20600 PR DRAIN/INJECT SMALL JOINT/BURSA: ICD-10-PCS | Mod: HCNC,LT,S$GLB, | Performed by: PODIATRIST

## 2021-01-21 PROCEDURE — 3074F PR MOST RECENT SYSTOLIC BLOOD PRESSURE < 130 MM HG: ICD-10-PCS | Mod: HCNC,CPTII,S$GLB, | Performed by: PODIATRIST

## 2021-01-21 PROCEDURE — 99999 PR PBB SHADOW E&M-EST. PATIENT-LVL IV: ICD-10-PCS | Mod: PBBFAC,HCNC,, | Performed by: PODIATRIST

## 2021-01-21 PROCEDURE — 1101F PR PT FALLS ASSESS DOC 0-1 FALLS W/OUT INJ PAST YR: ICD-10-PCS | Mod: HCNC,CPTII,S$GLB, | Performed by: FAMILY MEDICINE

## 2021-01-21 PROCEDURE — 1159F MED LIST DOCD IN RCRD: CPT | Mod: HCNC,S$GLB,, | Performed by: PODIATRIST

## 2021-01-21 PROCEDURE — 11721 DEBRIDE NAIL 6 OR MORE: CPT | Mod: HCNC,S$GLB,, | Performed by: PODIATRIST

## 2021-01-21 PROCEDURE — 3078F DIAST BP <80 MM HG: CPT | Mod: HCNC,CPTII,S$GLB, | Performed by: FAMILY MEDICINE

## 2021-01-21 PROCEDURE — 3078F PR MOST RECENT DIASTOLIC BLOOD PRESSURE < 80 MM HG: ICD-10-PCS | Mod: HCNC,CPTII,S$GLB, | Performed by: FAMILY MEDICINE

## 2021-01-21 PROCEDURE — 1126F PR PAIN SEVERITY QUANTIFIED, NO PAIN PRESENT: ICD-10-PCS | Mod: HCNC,S$GLB,, | Performed by: PODIATRIST

## 2021-01-21 PROCEDURE — 3074F SYST BP LT 130 MM HG: CPT | Mod: HCNC,CPTII,S$GLB, | Performed by: FAMILY MEDICINE

## 2021-01-21 PROCEDURE — 99999 PR PBB SHADOW E&M-EST. PATIENT-LVL IV: ICD-10-PCS | Mod: PBBFAC,HCNC,, | Performed by: FAMILY MEDICINE

## 2021-01-21 PROCEDURE — 1126F AMNT PAIN NOTED NONE PRSNT: CPT | Mod: HCNC,S$GLB,, | Performed by: FAMILY MEDICINE

## 2021-01-21 PROCEDURE — 3074F SYST BP LT 130 MM HG: CPT | Mod: HCNC,CPTII,S$GLB, | Performed by: PODIATRIST

## 2021-01-21 PROCEDURE — 99999 PR PBB SHADOW E&M-EST. PATIENT-LVL IV: CPT | Mod: PBBFAC,HCNC,, | Performed by: PODIATRIST

## 2021-01-21 PROCEDURE — 3288F PR FALLS RISK ASSESSMENT DOCUMENTED: ICD-10-PCS | Mod: HCNC,CPTII,S$GLB, | Performed by: FAMILY MEDICINE

## 2021-01-21 PROCEDURE — 3044F PR MOST RECENT HEMOGLOBIN A1C LEVEL <7.0%: ICD-10-PCS | Mod: HCNC,CPTII,S$GLB, | Performed by: FAMILY MEDICINE

## 2021-01-21 PROCEDURE — 3008F PR BODY MASS INDEX (BMI) DOCUMENTED: ICD-10-PCS | Mod: HCNC,CPTII,S$GLB, | Performed by: PODIATRIST

## 2021-01-21 PROCEDURE — 1159F PR MEDICATION LIST DOCUMENTED IN MEDICAL RECORD: ICD-10-PCS | Mod: HCNC,S$GLB,, | Performed by: FAMILY MEDICINE

## 2021-01-21 PROCEDURE — 99214 PR OFFICE/OUTPT VISIT, EST, LEVL IV, 30-39 MIN: ICD-10-PCS | Mod: 25,HCNC,S$GLB, | Performed by: PODIATRIST

## 2021-01-21 PROCEDURE — 3288F PR FALLS RISK ASSESSMENT DOCUMENTED: ICD-10-PCS | Mod: HCNC,CPTII,S$GLB, | Performed by: PODIATRIST

## 2021-01-21 PROCEDURE — 3008F BODY MASS INDEX DOCD: CPT | Mod: HCNC,CPTII,S$GLB, | Performed by: FAMILY MEDICINE

## 2021-01-21 PROCEDURE — 99999 PR PBB SHADOW E&M-EST. PATIENT-LVL IV: CPT | Mod: PBBFAC,HCNC,, | Performed by: FAMILY MEDICINE

## 2021-01-21 PROCEDURE — 73630 X-RAY EXAM OF FOOT: CPT | Mod: TC,HCNC,LT

## 2021-01-21 PROCEDURE — 3044F PR MOST RECENT HEMOGLOBIN A1C LEVEL <7.0%: ICD-10-PCS | Mod: HCNC,CPTII,S$GLB, | Performed by: PODIATRIST

## 2021-01-21 PROCEDURE — 99499 RISK ADDL DX/OHS AUDIT: ICD-10-PCS | Mod: HCNC,S$GLB,, | Performed by: PODIATRIST

## 2021-01-21 PROCEDURE — 1126F AMNT PAIN NOTED NONE PRSNT: CPT | Mod: HCNC,S$GLB,, | Performed by: PODIATRIST

## 2021-01-21 PROCEDURE — 73630 X-RAY EXAM OF FOOT: CPT | Mod: 26,HCNC,LT, | Performed by: RADIOLOGY

## 2021-01-21 PROCEDURE — 3078F DIAST BP <80 MM HG: CPT | Mod: HCNC,CPTII,S$GLB, | Performed by: PODIATRIST

## 2021-01-21 PROCEDURE — 3074F PR MOST RECENT SYSTOLIC BLOOD PRESSURE < 130 MM HG: ICD-10-PCS | Mod: HCNC,CPTII,S$GLB, | Performed by: FAMILY MEDICINE

## 2021-01-21 PROCEDURE — 20600 DRAIN/INJ JOINT/BURSA W/O US: CPT | Mod: HCNC,LT,S$GLB, | Performed by: PODIATRIST

## 2021-01-21 PROCEDURE — 1101F PT FALLS ASSESS-DOCD LE1/YR: CPT | Mod: HCNC,CPTII,S$GLB, | Performed by: FAMILY MEDICINE

## 2021-01-21 PROCEDURE — 3072F PR LOW RISK FOR RETINOPATHY: ICD-10-PCS | Mod: HCNC,S$GLB,, | Performed by: PODIATRIST

## 2021-01-21 PROCEDURE — 99214 PR OFFICE/OUTPT VISIT, EST, LEVL IV, 30-39 MIN: ICD-10-PCS | Mod: HCNC,S$GLB,, | Performed by: FAMILY MEDICINE

## 2021-01-21 PROCEDURE — 1101F PR PT FALLS ASSESS DOC 0-1 FALLS W/OUT INJ PAST YR: ICD-10-PCS | Mod: HCNC,CPTII,S$GLB, | Performed by: PODIATRIST

## 2021-01-21 PROCEDURE — 3072F PR LOW RISK FOR RETINOPATHY: ICD-10-PCS | Mod: HCNC,S$GLB,, | Performed by: FAMILY MEDICINE

## 2021-01-21 PROCEDURE — 3072F LOW RISK FOR RETINOPATHY: CPT | Mod: HCNC,S$GLB,, | Performed by: FAMILY MEDICINE

## 2021-01-21 PROCEDURE — 1126F PR PAIN SEVERITY QUANTIFIED, NO PAIN PRESENT: ICD-10-PCS | Mod: HCNC,S$GLB,, | Performed by: FAMILY MEDICINE

## 2021-01-21 PROCEDURE — 3072F LOW RISK FOR RETINOPATHY: CPT | Mod: HCNC,S$GLB,, | Performed by: PODIATRIST

## 2021-01-21 PROCEDURE — 3044F HG A1C LEVEL LT 7.0%: CPT | Mod: HCNC,CPTII,S$GLB, | Performed by: FAMILY MEDICINE

## 2021-01-21 PROCEDURE — 1159F PR MEDICATION LIST DOCUMENTED IN MEDICAL RECORD: ICD-10-PCS | Mod: HCNC,S$GLB,, | Performed by: PODIATRIST

## 2021-01-21 PROCEDURE — 99499 UNLISTED E&M SERVICE: CPT | Mod: HCNC,S$GLB,, | Performed by: PODIATRIST

## 2021-01-21 PROCEDURE — 99214 OFFICE O/P EST MOD 30 MIN: CPT | Mod: 25,HCNC,S$GLB, | Performed by: PODIATRIST

## 2021-01-21 PROCEDURE — 1101F PT FALLS ASSESS-DOCD LE1/YR: CPT | Mod: HCNC,CPTII,S$GLB, | Performed by: PODIATRIST

## 2021-01-21 PROCEDURE — 3078F PR MOST RECENT DIASTOLIC BLOOD PRESSURE < 80 MM HG: ICD-10-PCS | Mod: HCNC,CPTII,S$GLB, | Performed by: PODIATRIST

## 2021-01-21 PROCEDURE — 11721 PR DEBRIDEMENT OF NAILS, 6 OR MORE: ICD-10-PCS | Mod: HCNC,S$GLB,, | Performed by: PODIATRIST

## 2021-01-21 PROCEDURE — 3008F PR BODY MASS INDEX (BMI) DOCUMENTED: ICD-10-PCS | Mod: HCNC,CPTII,S$GLB, | Performed by: FAMILY MEDICINE

## 2021-01-21 PROCEDURE — 73630 XR FOOT COMPLETE 3 VIEW LEFT: ICD-10-PCS | Mod: 26,HCNC,LT, | Performed by: RADIOLOGY

## 2021-01-21 PROCEDURE — 3008F BODY MASS INDEX DOCD: CPT | Mod: HCNC,CPTII,S$GLB, | Performed by: PODIATRIST

## 2021-01-21 PROCEDURE — 3288F FALL RISK ASSESSMENT DOCD: CPT | Mod: HCNC,CPTII,S$GLB, | Performed by: FAMILY MEDICINE

## 2021-01-21 PROCEDURE — 3044F HG A1C LEVEL LT 7.0%: CPT | Mod: HCNC,CPTII,S$GLB, | Performed by: PODIATRIST

## 2021-01-21 PROCEDURE — 1159F MED LIST DOCD IN RCRD: CPT | Mod: HCNC,S$GLB,, | Performed by: FAMILY MEDICINE

## 2021-01-21 PROCEDURE — 99214 OFFICE O/P EST MOD 30 MIN: CPT | Mod: HCNC,S$GLB,, | Performed by: FAMILY MEDICINE

## 2021-01-21 PROCEDURE — 3288F FALL RISK ASSESSMENT DOCD: CPT | Mod: HCNC,CPTII,S$GLB, | Performed by: PODIATRIST

## 2021-01-21 RX ORDER — QUINAPRIL 5 1/1
5 TABLET ORAL NIGHTLY
Qty: 90 TABLET | Refills: 1 | Status: SHIPPED | OUTPATIENT
Start: 2021-01-21 | End: 2021-05-08

## 2021-01-21 RX ORDER — DEXAMETHASONE SODIUM PHOSPHATE 4 MG/ML
4 INJECTION, SOLUTION INTRA-ARTICULAR; INTRALESIONAL; INTRAMUSCULAR; INTRAVENOUS; SOFT TISSUE ONCE
Status: COMPLETED | OUTPATIENT
Start: 2021-01-21 | End: 2021-01-21

## 2021-01-21 RX ORDER — LORAZEPAM 0.5 MG/1
0.5 TABLET ORAL 2 TIMES DAILY PRN
Qty: 30 TABLET | Refills: 2 | Status: SHIPPED | OUTPATIENT
Start: 2021-01-21 | End: 2021-03-18 | Stop reason: SDUPTHER

## 2021-01-21 RX ORDER — SERTRALINE HYDROCHLORIDE 50 MG/1
50 TABLET, FILM COATED ORAL DAILY
Qty: 30 TABLET | Refills: 5 | Status: SHIPPED | OUTPATIENT
Start: 2021-01-21 | End: 2021-02-18

## 2021-01-21 RX ORDER — TRIAMCINOLONE ACETONIDE 40 MG/ML
40 INJECTION, SUSPENSION INTRA-ARTICULAR; INTRAMUSCULAR ONCE
Status: COMPLETED | OUTPATIENT
Start: 2021-01-21 | End: 2021-01-21

## 2021-01-21 RX ADMIN — DEXAMETHASONE SODIUM PHOSPHATE 4 MG: 4 INJECTION, SOLUTION INTRA-ARTICULAR; INTRALESIONAL; INTRAMUSCULAR; INTRAVENOUS; SOFT TISSUE at 03:01

## 2021-01-21 RX ADMIN — TRIAMCINOLONE ACETONIDE 40 MG: 40 INJECTION, SUSPENSION INTRA-ARTICULAR; INTRAMUSCULAR at 03:01

## 2021-01-26 ENCOUNTER — PATIENT MESSAGE (OUTPATIENT)
Dept: PODIATRY | Facility: CLINIC | Age: 71
End: 2021-01-26

## 2021-01-31 ENCOUNTER — IMMUNIZATION (OUTPATIENT)
Dept: INTERNAL MEDICINE | Facility: CLINIC | Age: 71
End: 2021-01-31
Payer: MEDICARE

## 2021-01-31 DIAGNOSIS — Z23 NEED FOR VACCINATION: Primary | ICD-10-CM

## 2021-01-31 PROCEDURE — 91300 COVID-19, MRNA, LNP-S, PF, 30 MCG/0.3 ML DOSE VACCINE: CPT | Mod: HCNC,PBBFAC | Performed by: FAMILY MEDICINE

## 2021-01-31 PROCEDURE — 0002A COVID-19, MRNA, LNP-S, PF, 30 MCG/0.3 ML DOSE VACCINE: CPT | Mod: HCNC,PBBFAC | Performed by: FAMILY MEDICINE

## 2021-02-03 RX ORDER — VIT C/E/ZN/COPPR/LUTEIN/ZEAXAN 250MG-90MG
1000 CAPSULE ORAL DAILY
COMMUNITY

## 2021-02-03 RX ORDER — CALCIUM CARBONATE 500(1250)
1 TABLET ORAL 2 TIMES DAILY
COMMUNITY
End: 2021-11-03

## 2021-02-09 ENCOUNTER — PES CALL (OUTPATIENT)
Dept: ADMINISTRATIVE | Facility: CLINIC | Age: 71
End: 2021-02-09

## 2021-02-18 ENCOUNTER — LAB VISIT (OUTPATIENT)
Dept: LAB | Facility: HOSPITAL | Age: 71
End: 2021-02-18
Attending: FAMILY MEDICINE
Payer: MEDICARE

## 2021-02-18 ENCOUNTER — OFFICE VISIT (OUTPATIENT)
Dept: INTERNAL MEDICINE | Facility: CLINIC | Age: 71
End: 2021-02-18
Payer: MEDICARE

## 2021-02-18 VITALS
SYSTOLIC BLOOD PRESSURE: 132 MMHG | HEART RATE: 100 BPM | WEIGHT: 203.06 LBS | DIASTOLIC BLOOD PRESSURE: 80 MMHG | OXYGEN SATURATION: 94 % | BODY MASS INDEX: 35.98 KG/M2 | HEIGHT: 63 IN | TEMPERATURE: 99 F

## 2021-02-18 DIAGNOSIS — I10 ESSENTIAL HYPERTENSION: ICD-10-CM

## 2021-02-18 DIAGNOSIS — E11.69 HYPERLIPIDEMIA ASSOCIATED WITH TYPE 2 DIABETES MELLITUS: ICD-10-CM

## 2021-02-18 DIAGNOSIS — I47.10 PAROXYSMAL SVT (SUPRAVENTRICULAR TACHYCARDIA): ICD-10-CM

## 2021-02-18 DIAGNOSIS — R69 TAKING MEDICATION FOR CHRONIC DISEASE: ICD-10-CM

## 2021-02-18 DIAGNOSIS — F43.0 PANIC ATTACK AS REACTION TO STRESS: Primary | ICD-10-CM

## 2021-02-18 DIAGNOSIS — F41.0 PANIC ATTACK AS REACTION TO STRESS: Primary | ICD-10-CM

## 2021-02-18 DIAGNOSIS — E78.5 HYPERLIPIDEMIA ASSOCIATED WITH TYPE 2 DIABETES MELLITUS: ICD-10-CM

## 2021-02-18 DIAGNOSIS — E11.9 CONTROLLED TYPE 2 DIABETES MELLITUS WITHOUT COMPLICATION, WITHOUT LONG-TERM CURRENT USE OF INSULIN: ICD-10-CM

## 2021-02-18 DIAGNOSIS — L91.8 SKIN TAG: ICD-10-CM

## 2021-02-18 PROCEDURE — 3079F DIAST BP 80-89 MM HG: CPT | Mod: CPTII,S$GLB,, | Performed by: FAMILY MEDICINE

## 2021-02-18 PROCEDURE — 1126F AMNT PAIN NOTED NONE PRSNT: CPT | Mod: S$GLB,,, | Performed by: FAMILY MEDICINE

## 2021-02-18 PROCEDURE — 1126F PR PAIN SEVERITY QUANTIFIED, NO PAIN PRESENT: ICD-10-PCS | Mod: S$GLB,,, | Performed by: FAMILY MEDICINE

## 2021-02-18 PROCEDURE — 3075F SYST BP GE 130 - 139MM HG: CPT | Mod: CPTII,S$GLB,, | Performed by: FAMILY MEDICINE

## 2021-02-18 PROCEDURE — 80048 BASIC METABOLIC PNL TOTAL CA: CPT

## 2021-02-18 PROCEDURE — 1101F PT FALLS ASSESS-DOCD LE1/YR: CPT | Mod: CPTII,S$GLB,, | Performed by: FAMILY MEDICINE

## 2021-02-18 PROCEDURE — 3044F HG A1C LEVEL LT 7.0%: CPT | Mod: CPTII,S$GLB,, | Performed by: FAMILY MEDICINE

## 2021-02-18 PROCEDURE — 3008F BODY MASS INDEX DOCD: CPT | Mod: CPTII,S$GLB,, | Performed by: FAMILY MEDICINE

## 2021-02-18 PROCEDURE — 1101F PR PT FALLS ASSESS DOC 0-1 FALLS W/OUT INJ PAST YR: ICD-10-PCS | Mod: CPTII,S$GLB,, | Performed by: FAMILY MEDICINE

## 2021-02-18 PROCEDURE — 99214 PR OFFICE/OUTPT VISIT, EST, LEVL IV, 30-39 MIN: ICD-10-PCS | Mod: S$GLB,,, | Performed by: FAMILY MEDICINE

## 2021-02-18 PROCEDURE — 3072F LOW RISK FOR RETINOPATHY: CPT | Mod: S$GLB,,, | Performed by: FAMILY MEDICINE

## 2021-02-18 PROCEDURE — 99214 OFFICE O/P EST MOD 30 MIN: CPT | Mod: S$GLB,,, | Performed by: FAMILY MEDICINE

## 2021-02-18 PROCEDURE — 3079F PR MOST RECENT DIASTOLIC BLOOD PRESSURE 80-89 MM HG: ICD-10-PCS | Mod: CPTII,S$GLB,, | Performed by: FAMILY MEDICINE

## 2021-02-18 PROCEDURE — 99999 PR PBB SHADOW E&M-EST. PATIENT-LVL V: ICD-10-PCS | Mod: PBBFAC,,, | Performed by: FAMILY MEDICINE

## 2021-02-18 PROCEDURE — 99999 PR PBB SHADOW E&M-EST. PATIENT-LVL V: CPT | Mod: PBBFAC,,, | Performed by: FAMILY MEDICINE

## 2021-02-18 PROCEDURE — 1159F MED LIST DOCD IN RCRD: CPT | Mod: S$GLB,,, | Performed by: FAMILY MEDICINE

## 2021-02-18 PROCEDURE — 36415 COLL VENOUS BLD VENIPUNCTURE: CPT

## 2021-02-18 PROCEDURE — 3288F FALL RISK ASSESSMENT DOCD: CPT | Mod: CPTII,S$GLB,, | Performed by: FAMILY MEDICINE

## 2021-02-18 PROCEDURE — 3072F PR LOW RISK FOR RETINOPATHY: ICD-10-PCS | Mod: S$GLB,,, | Performed by: FAMILY MEDICINE

## 2021-02-18 PROCEDURE — 1159F PR MEDICATION LIST DOCUMENTED IN MEDICAL RECORD: ICD-10-PCS | Mod: S$GLB,,, | Performed by: FAMILY MEDICINE

## 2021-02-18 PROCEDURE — 3075F PR MOST RECENT SYSTOLIC BLOOD PRESS GE 130-139MM HG: ICD-10-PCS | Mod: CPTII,S$GLB,, | Performed by: FAMILY MEDICINE

## 2021-02-18 PROCEDURE — 3044F PR MOST RECENT HEMOGLOBIN A1C LEVEL <7.0%: ICD-10-PCS | Mod: CPTII,S$GLB,, | Performed by: FAMILY MEDICINE

## 2021-02-18 PROCEDURE — 3008F PR BODY MASS INDEX (BMI) DOCUMENTED: ICD-10-PCS | Mod: CPTII,S$GLB,, | Performed by: FAMILY MEDICINE

## 2021-02-18 PROCEDURE — 3288F PR FALLS RISK ASSESSMENT DOCUMENTED: ICD-10-PCS | Mod: CPTII,S$GLB,, | Performed by: FAMILY MEDICINE

## 2021-02-18 RX ORDER — BUSPIRONE HYDROCHLORIDE 5 MG/1
5 TABLET ORAL 2 TIMES DAILY
Qty: 60 TABLET | Refills: 0 | Status: SHIPPED | OUTPATIENT
Start: 2021-02-18 | End: 2021-03-12

## 2021-02-19 LAB
ANION GAP SERPL CALC-SCNC: 12 MMOL/L (ref 8–16)
BUN SERPL-MCNC: 11 MG/DL (ref 8–23)
CALCIUM SERPL-MCNC: 10 MG/DL (ref 8.7–10.5)
CHLORIDE SERPL-SCNC: 105 MMOL/L (ref 95–110)
CO2 SERPL-SCNC: 25 MMOL/L (ref 23–29)
CREAT SERPL-MCNC: 0.8 MG/DL (ref 0.5–1.4)
EST. GFR  (AFRICAN AMERICAN): >60 ML/MIN/1.73 M^2
EST. GFR  (NON AFRICAN AMERICAN): >60 ML/MIN/1.73 M^2
GLUCOSE SERPL-MCNC: 144 MG/DL (ref 70–110)
POTASSIUM SERPL-SCNC: 4.2 MMOL/L (ref 3.5–5.1)
SODIUM SERPL-SCNC: 142 MMOL/L (ref 136–145)

## 2021-03-12 RX ORDER — BUSPIRONE HYDROCHLORIDE 5 MG/1
TABLET ORAL
Qty: 60 TABLET | Refills: 0 | Status: SHIPPED | OUTPATIENT
Start: 2021-03-12 | End: 2021-08-25

## 2021-03-18 ENCOUNTER — OFFICE VISIT (OUTPATIENT)
Dept: INTERNAL MEDICINE | Facility: CLINIC | Age: 71
End: 2021-03-18
Payer: MEDICARE

## 2021-03-18 ENCOUNTER — OFFICE VISIT (OUTPATIENT)
Dept: DERMATOLOGY | Facility: CLINIC | Age: 71
End: 2021-03-18
Payer: MEDICARE

## 2021-03-18 VITALS
BODY MASS INDEX: 36.32 KG/M2 | HEIGHT: 63 IN | SYSTOLIC BLOOD PRESSURE: 122 MMHG | DIASTOLIC BLOOD PRESSURE: 84 MMHG | HEART RATE: 103 BPM | WEIGHT: 205 LBS | OXYGEN SATURATION: 95 % | TEMPERATURE: 98 F

## 2021-03-18 DIAGNOSIS — F41.0 PANIC ATTACK AS REACTION TO STRESS: Primary | ICD-10-CM

## 2021-03-18 DIAGNOSIS — F43.0 PANIC ATTACK AS REACTION TO STRESS: Primary | ICD-10-CM

## 2021-03-18 DIAGNOSIS — L70.0 ACNE VULGARIS: Primary | ICD-10-CM

## 2021-03-18 DIAGNOSIS — I47.10 PAROXYSMAL SVT (SUPRAVENTRICULAR TACHYCARDIA): ICD-10-CM

## 2021-03-18 DIAGNOSIS — I10 ESSENTIAL HYPERTENSION: ICD-10-CM

## 2021-03-18 DIAGNOSIS — E11.9 CONTROLLED TYPE 2 DIABETES MELLITUS WITHOUT COMPLICATION, WITHOUT LONG-TERM CURRENT USE OF INSULIN: ICD-10-CM

## 2021-03-18 DIAGNOSIS — D23.9 DILATED PORE OF WINER: ICD-10-CM

## 2021-03-18 DIAGNOSIS — L91.8 ACROCHORDON: ICD-10-CM

## 2021-03-18 PROCEDURE — 3078F PR MOST RECENT DIASTOLIC BLOOD PRESSURE < 80 MM HG: ICD-10-PCS | Mod: CPTII,S$GLB,, | Performed by: DERMATOLOGY

## 2021-03-18 PROCEDURE — 1126F PR PAIN SEVERITY QUANTIFIED, NO PAIN PRESENT: ICD-10-PCS | Mod: S$GLB,,, | Performed by: FAMILY MEDICINE

## 2021-03-18 PROCEDURE — 1159F PR MEDICATION LIST DOCUMENTED IN MEDICAL RECORD: ICD-10-PCS | Mod: S$GLB,,, | Performed by: FAMILY MEDICINE

## 2021-03-18 PROCEDURE — 1126F PR PAIN SEVERITY QUANTIFIED, NO PAIN PRESENT: ICD-10-PCS | Mod: S$GLB,,, | Performed by: DERMATOLOGY

## 2021-03-18 PROCEDURE — 1159F MED LIST DOCD IN RCRD: CPT | Mod: S$GLB,,, | Performed by: DERMATOLOGY

## 2021-03-18 PROCEDURE — 99999 PR PBB SHADOW E&M-EST. PATIENT-LVL III: ICD-10-PCS | Mod: PBBFAC,,, | Performed by: DERMATOLOGY

## 2021-03-18 PROCEDURE — 1159F MED LIST DOCD IN RCRD: CPT | Mod: S$GLB,,, | Performed by: FAMILY MEDICINE

## 2021-03-18 PROCEDURE — 3072F LOW RISK FOR RETINOPATHY: CPT | Mod: S$GLB,,, | Performed by: FAMILY MEDICINE

## 2021-03-18 PROCEDURE — 1101F PT FALLS ASSESS-DOCD LE1/YR: CPT | Mod: CPTII,S$GLB,, | Performed by: DERMATOLOGY

## 2021-03-18 PROCEDURE — 99999 PR PBB SHADOW E&M-EST. PATIENT-LVL IV: ICD-10-PCS | Mod: PBBFAC,,, | Performed by: FAMILY MEDICINE

## 2021-03-18 PROCEDURE — 3074F PR MOST RECENT SYSTOLIC BLOOD PRESSURE < 130 MM HG: ICD-10-PCS | Mod: CPTII,S$GLB,, | Performed by: DERMATOLOGY

## 2021-03-18 PROCEDURE — 3072F PR LOW RISK FOR RETINOPATHY: ICD-10-PCS | Mod: S$GLB,,, | Performed by: DERMATOLOGY

## 2021-03-18 PROCEDURE — 3044F HG A1C LEVEL LT 7.0%: CPT | Mod: CPTII,S$GLB,, | Performed by: FAMILY MEDICINE

## 2021-03-18 PROCEDURE — 99214 OFFICE O/P EST MOD 30 MIN: CPT | Mod: S$GLB,,, | Performed by: FAMILY MEDICINE

## 2021-03-18 PROCEDURE — 3008F BODY MASS INDEX DOCD: CPT | Mod: CPTII,S$GLB,, | Performed by: FAMILY MEDICINE

## 2021-03-18 PROCEDURE — 3074F SYST BP LT 130 MM HG: CPT | Mod: CPTII,S$GLB,, | Performed by: DERMATOLOGY

## 2021-03-18 PROCEDURE — 3288F FALL RISK ASSESSMENT DOCD: CPT | Mod: CPTII,S$GLB,, | Performed by: DERMATOLOGY

## 2021-03-18 PROCEDURE — 3044F PR MOST RECENT HEMOGLOBIN A1C LEVEL <7.0%: ICD-10-PCS | Mod: CPTII,S$GLB,, | Performed by: FAMILY MEDICINE

## 2021-03-18 PROCEDURE — 3074F SYST BP LT 130 MM HG: CPT | Mod: CPTII,S$GLB,, | Performed by: FAMILY MEDICINE

## 2021-03-18 PROCEDURE — 1101F PR PT FALLS ASSESS DOC 0-1 FALLS W/OUT INJ PAST YR: ICD-10-PCS | Mod: CPTII,S$GLB,, | Performed by: FAMILY MEDICINE

## 2021-03-18 PROCEDURE — 1126F AMNT PAIN NOTED NONE PRSNT: CPT | Mod: S$GLB,,, | Performed by: DERMATOLOGY

## 2021-03-18 PROCEDURE — 99999 PR PBB SHADOW E&M-EST. PATIENT-LVL III: CPT | Mod: PBBFAC,,, | Performed by: DERMATOLOGY

## 2021-03-18 PROCEDURE — 3288F FALL RISK ASSESSMENT DOCD: CPT | Mod: CPTII,S$GLB,, | Performed by: FAMILY MEDICINE

## 2021-03-18 PROCEDURE — 99214 PR OFFICE/OUTPT VISIT, EST, LEVL IV, 30-39 MIN: ICD-10-PCS | Mod: S$GLB,,, | Performed by: FAMILY MEDICINE

## 2021-03-18 PROCEDURE — 1101F PR PT FALLS ASSESS DOC 0-1 FALLS W/OUT INJ PAST YR: ICD-10-PCS | Mod: CPTII,S$GLB,, | Performed by: DERMATOLOGY

## 2021-03-18 PROCEDURE — 3078F DIAST BP <80 MM HG: CPT | Mod: CPTII,S$GLB,, | Performed by: DERMATOLOGY

## 2021-03-18 PROCEDURE — 3072F LOW RISK FOR RETINOPATHY: CPT | Mod: S$GLB,,, | Performed by: DERMATOLOGY

## 2021-03-18 PROCEDURE — 1159F PR MEDICATION LIST DOCUMENTED IN MEDICAL RECORD: ICD-10-PCS | Mod: S$GLB,,, | Performed by: DERMATOLOGY

## 2021-03-18 PROCEDURE — 99203 OFFICE O/P NEW LOW 30 MIN: CPT | Mod: S$GLB,,, | Performed by: DERMATOLOGY

## 2021-03-18 PROCEDURE — 3288F PR FALLS RISK ASSESSMENT DOCUMENTED: ICD-10-PCS | Mod: CPTII,S$GLB,, | Performed by: FAMILY MEDICINE

## 2021-03-18 PROCEDURE — 3008F PR BODY MASS INDEX (BMI) DOCUMENTED: ICD-10-PCS | Mod: CPTII,S$GLB,, | Performed by: FAMILY MEDICINE

## 2021-03-18 PROCEDURE — 3074F PR MOST RECENT SYSTOLIC BLOOD PRESSURE < 130 MM HG: ICD-10-PCS | Mod: CPTII,S$GLB,, | Performed by: FAMILY MEDICINE

## 2021-03-18 PROCEDURE — 1126F AMNT PAIN NOTED NONE PRSNT: CPT | Mod: S$GLB,,, | Performed by: FAMILY MEDICINE

## 2021-03-18 PROCEDURE — 3288F PR FALLS RISK ASSESSMENT DOCUMENTED: ICD-10-PCS | Mod: CPTII,S$GLB,, | Performed by: DERMATOLOGY

## 2021-03-18 PROCEDURE — 3079F DIAST BP 80-89 MM HG: CPT | Mod: CPTII,S$GLB,, | Performed by: FAMILY MEDICINE

## 2021-03-18 PROCEDURE — 99999 PR PBB SHADOW E&M-EST. PATIENT-LVL IV: CPT | Mod: PBBFAC,,, | Performed by: FAMILY MEDICINE

## 2021-03-18 PROCEDURE — 99203 PR OFFICE/OUTPT VISIT, NEW, LEVL III, 30-44 MIN: ICD-10-PCS | Mod: S$GLB,,, | Performed by: DERMATOLOGY

## 2021-03-18 PROCEDURE — 1101F PT FALLS ASSESS-DOCD LE1/YR: CPT | Mod: CPTII,S$GLB,, | Performed by: FAMILY MEDICINE

## 2021-03-18 PROCEDURE — 3079F PR MOST RECENT DIASTOLIC BLOOD PRESSURE 80-89 MM HG: ICD-10-PCS | Mod: CPTII,S$GLB,, | Performed by: FAMILY MEDICINE

## 2021-03-18 PROCEDURE — 3072F PR LOW RISK FOR RETINOPATHY: ICD-10-PCS | Mod: S$GLB,,, | Performed by: FAMILY MEDICINE

## 2021-03-18 RX ORDER — BUSPIRONE HYDROCHLORIDE 10 MG/1
10 TABLET ORAL 3 TIMES DAILY
Qty: 90 TABLET | Refills: 0 | Status: SHIPPED | OUTPATIENT
Start: 2021-03-18 | End: 2021-04-07

## 2021-03-18 RX ORDER — TRETINOIN 0.25 MG/G
CREAM TOPICAL NIGHTLY
Qty: 20 G | Refills: 3 | Status: SHIPPED | OUTPATIENT
Start: 2021-03-18 | End: 2021-08-25

## 2021-03-18 RX ORDER — LORAZEPAM 0.5 MG/1
0.5 TABLET ORAL DAILY PRN
Qty: 30 TABLET | Refills: 0 | Status: SHIPPED | OUTPATIENT
Start: 2021-03-18 | End: 2021-04-22 | Stop reason: SDUPTHER

## 2021-03-19 DIAGNOSIS — E11.9 DM II (DIABETES MELLITUS, TYPE II), CONTROLLED: ICD-10-CM

## 2021-03-19 RX ORDER — BUSPIRONE HYDROCHLORIDE 5 MG/1
TABLET ORAL
Qty: 180 TABLET | Refills: 1 | OUTPATIENT
Start: 2021-03-19

## 2021-03-20 ENCOUNTER — PATIENT MESSAGE (OUTPATIENT)
Dept: DERMATOLOGY | Facility: CLINIC | Age: 71
End: 2021-03-20

## 2021-03-22 ENCOUNTER — PATIENT MESSAGE (OUTPATIENT)
Dept: INTERNAL MEDICINE | Facility: CLINIC | Age: 71
End: 2021-03-22

## 2021-03-22 RX ORDER — BLOOD SUGAR DIAGNOSTIC
STRIP MISCELLANEOUS
Qty: 100 STRIP | Refills: 3 | Status: SHIPPED | OUTPATIENT
Start: 2021-03-22 | End: 2022-03-21 | Stop reason: SDUPTHER

## 2021-04-20 RX ORDER — BUSPIRONE HYDROCHLORIDE 10 MG/1
TABLET ORAL
Qty: 90 TABLET | Refills: 0 | Status: SHIPPED | OUTPATIENT
Start: 2021-04-20 | End: 2021-04-22 | Stop reason: SDUPTHER

## 2021-04-22 ENCOUNTER — OFFICE VISIT (OUTPATIENT)
Dept: INTERNAL MEDICINE | Facility: CLINIC | Age: 71
End: 2021-04-22
Payer: MEDICARE

## 2021-04-22 VITALS
SYSTOLIC BLOOD PRESSURE: 138 MMHG | TEMPERATURE: 98 F | OXYGEN SATURATION: 95 % | HEART RATE: 106 BPM | HEIGHT: 63 IN | WEIGHT: 207.25 LBS | DIASTOLIC BLOOD PRESSURE: 82 MMHG | BODY MASS INDEX: 36.72 KG/M2

## 2021-04-22 DIAGNOSIS — I10 ESSENTIAL HYPERTENSION: ICD-10-CM

## 2021-04-22 DIAGNOSIS — E11.9 CONTROLLED TYPE 2 DIABETES MELLITUS WITHOUT COMPLICATION, WITHOUT LONG-TERM CURRENT USE OF INSULIN: ICD-10-CM

## 2021-04-22 DIAGNOSIS — F43.0 PANIC ATTACK AS REACTION TO STRESS: Primary | ICD-10-CM

## 2021-04-22 DIAGNOSIS — F41.0 PANIC ATTACK AS REACTION TO STRESS: Primary | ICD-10-CM

## 2021-04-22 DIAGNOSIS — I47.10 PAROXYSMAL SVT (SUPRAVENTRICULAR TACHYCARDIA): ICD-10-CM

## 2021-04-22 PROCEDURE — 1101F PT FALLS ASSESS-DOCD LE1/YR: CPT | Mod: HCNC,CPTII,S$GLB, | Performed by: FAMILY MEDICINE

## 2021-04-22 PROCEDURE — 99999 PR PBB SHADOW E&M-EST. PATIENT-LVL IV: CPT | Mod: PBBFAC,HCNC,, | Performed by: FAMILY MEDICINE

## 2021-04-22 PROCEDURE — 3288F FALL RISK ASSESSMENT DOCD: CPT | Mod: HCNC,CPTII,S$GLB, | Performed by: FAMILY MEDICINE

## 2021-04-22 PROCEDURE — 99214 PR OFFICE/OUTPT VISIT, EST, LEVL IV, 30-39 MIN: ICD-10-PCS | Mod: HCNC,S$GLB,, | Performed by: FAMILY MEDICINE

## 2021-04-22 PROCEDURE — 1159F MED LIST DOCD IN RCRD: CPT | Mod: HCNC,S$GLB,, | Performed by: FAMILY MEDICINE

## 2021-04-22 PROCEDURE — 99214 OFFICE O/P EST MOD 30 MIN: CPT | Mod: HCNC,S$GLB,, | Performed by: FAMILY MEDICINE

## 2021-04-22 PROCEDURE — 99499 RISK ADDL DX/OHS AUDIT: ICD-10-PCS | Mod: S$GLB,,, | Performed by: FAMILY MEDICINE

## 2021-04-22 PROCEDURE — 3288F PR FALLS RISK ASSESSMENT DOCUMENTED: ICD-10-PCS | Mod: HCNC,CPTII,S$GLB, | Performed by: FAMILY MEDICINE

## 2021-04-22 PROCEDURE — 3008F PR BODY MASS INDEX (BMI) DOCUMENTED: ICD-10-PCS | Mod: HCNC,CPTII,S$GLB, | Performed by: FAMILY MEDICINE

## 2021-04-22 PROCEDURE — 99499 UNLISTED E&M SERVICE: CPT | Mod: S$GLB,,, | Performed by: FAMILY MEDICINE

## 2021-04-22 PROCEDURE — 3008F BODY MASS INDEX DOCD: CPT | Mod: HCNC,CPTII,S$GLB, | Performed by: FAMILY MEDICINE

## 2021-04-22 PROCEDURE — 99999 PR PBB SHADOW E&M-EST. PATIENT-LVL IV: ICD-10-PCS | Mod: PBBFAC,HCNC,, | Performed by: FAMILY MEDICINE

## 2021-04-22 PROCEDURE — 3072F LOW RISK FOR RETINOPATHY: CPT | Mod: HCNC,S$GLB,, | Performed by: FAMILY MEDICINE

## 2021-04-22 PROCEDURE — 1101F PR PT FALLS ASSESS DOC 0-1 FALLS W/OUT INJ PAST YR: ICD-10-PCS | Mod: HCNC,CPTII,S$GLB, | Performed by: FAMILY MEDICINE

## 2021-04-22 PROCEDURE — 1159F PR MEDICATION LIST DOCUMENTED IN MEDICAL RECORD: ICD-10-PCS | Mod: HCNC,S$GLB,, | Performed by: FAMILY MEDICINE

## 2021-04-22 PROCEDURE — 3072F PR LOW RISK FOR RETINOPATHY: ICD-10-PCS | Mod: HCNC,S$GLB,, | Performed by: FAMILY MEDICINE

## 2021-04-22 PROCEDURE — 1126F PR PAIN SEVERITY QUANTIFIED, NO PAIN PRESENT: ICD-10-PCS | Mod: HCNC,S$GLB,, | Performed by: FAMILY MEDICINE

## 2021-04-22 PROCEDURE — 1126F AMNT PAIN NOTED NONE PRSNT: CPT | Mod: HCNC,S$GLB,, | Performed by: FAMILY MEDICINE

## 2021-04-22 RX ORDER — BUSPIRONE HYDROCHLORIDE 10 MG/1
10 TABLET ORAL 3 TIMES DAILY
Qty: 90 TABLET | Refills: 2 | Status: SHIPPED | OUTPATIENT
Start: 2021-04-22 | End: 2021-06-27

## 2021-04-22 RX ORDER — LORAZEPAM 0.5 MG/1
0.5 TABLET ORAL DAILY PRN
Qty: 30 TABLET | Refills: 2 | Status: SHIPPED | OUTPATIENT
Start: 2021-04-22 | End: 2021-07-22 | Stop reason: SDUPTHER

## 2021-04-22 RX ORDER — ASPIRIN 81 MG/1
81 TABLET ORAL DAILY
COMMUNITY
End: 2021-11-03

## 2021-04-27 RX ORDER — OMEPRAZOLE 20 MG/1
20 CAPSULE, DELAYED RELEASE ORAL DAILY
Qty: 90 CAPSULE | Refills: 3 | Status: SHIPPED | OUTPATIENT
Start: 2021-04-27 | End: 2022-04-06

## 2021-05-07 DIAGNOSIS — I10 ESSENTIAL HYPERTENSION: ICD-10-CM

## 2021-05-08 RX ORDER — QUINAPRIL 5 1/1
TABLET ORAL
Qty: 90 TABLET | Refills: 2 | Status: SHIPPED | OUTPATIENT
Start: 2021-05-08 | End: 2021-11-03

## 2021-05-13 DIAGNOSIS — I15.9 SECONDARY HYPERTENSION: ICD-10-CM

## 2021-05-16 ENCOUNTER — PATIENT MESSAGE (OUTPATIENT)
Dept: INTERNAL MEDICINE | Facility: CLINIC | Age: 71
End: 2021-05-16

## 2021-05-16 DIAGNOSIS — G47.00 INSOMNIA, UNSPECIFIED TYPE: ICD-10-CM

## 2021-05-17 RX ORDER — METOPROLOL SUCCINATE 50 MG/1
TABLET, EXTENDED RELEASE ORAL
Qty: 90 TABLET | Refills: 3 | Status: SHIPPED | OUTPATIENT
Start: 2021-05-17 | End: 2022-04-06

## 2021-05-17 RX ORDER — ESZOPICLONE 3 MG/1
TABLET, FILM COATED ORAL
Qty: 90 TABLET | Refills: 1 | Status: SHIPPED | OUTPATIENT
Start: 2021-05-17 | End: 2021-10-25 | Stop reason: SDUPTHER

## 2021-05-26 ENCOUNTER — PES CALL (OUTPATIENT)
Dept: ADMINISTRATIVE | Facility: CLINIC | Age: 71
End: 2021-05-26

## 2021-06-03 NOTE — TELEPHONE ENCOUNTER
Patient informed of lab results.  Attempted to add AST/ALT to the blood drawn yesterday but tubes noncompatible.  Patient informed I'm requesting a repeat of her AST/ALT.  She will get it tonight or tomorrow.   Signed form faxed back to forms completion.

## 2021-06-27 RX ORDER — BUSPIRONE HYDROCHLORIDE 10 MG/1
TABLET ORAL
Qty: 270 TABLET | Refills: 0 | Status: SHIPPED | OUTPATIENT
Start: 2021-06-27 | End: 2021-08-25

## 2021-07-11 ENCOUNTER — PATIENT MESSAGE (OUTPATIENT)
Dept: INTERNAL MEDICINE | Facility: CLINIC | Age: 71
End: 2021-07-11

## 2021-07-22 ENCOUNTER — OFFICE VISIT (OUTPATIENT)
Dept: INTERNAL MEDICINE | Facility: CLINIC | Age: 71
End: 2021-07-22
Payer: MEDICARE

## 2021-07-22 ENCOUNTER — LAB VISIT (OUTPATIENT)
Dept: LAB | Facility: HOSPITAL | Age: 71
End: 2021-07-22
Attending: FAMILY MEDICINE
Payer: MEDICARE

## 2021-07-22 VITALS
DIASTOLIC BLOOD PRESSURE: 80 MMHG | BODY MASS INDEX: 36.45 KG/M2 | SYSTOLIC BLOOD PRESSURE: 136 MMHG | TEMPERATURE: 98 F | WEIGHT: 205.69 LBS | HEIGHT: 63 IN | HEART RATE: 118 BPM

## 2021-07-22 DIAGNOSIS — E11.9 CONTROLLED TYPE 2 DIABETES MELLITUS WITHOUT COMPLICATION, WITHOUT LONG-TERM CURRENT USE OF INSULIN: ICD-10-CM

## 2021-07-22 DIAGNOSIS — E11.69 HYPERLIPIDEMIA ASSOCIATED WITH TYPE 2 DIABETES MELLITUS: ICD-10-CM

## 2021-07-22 DIAGNOSIS — I47.10 PAROXYSMAL SVT (SUPRAVENTRICULAR TACHYCARDIA): ICD-10-CM

## 2021-07-22 DIAGNOSIS — I10 ESSENTIAL HYPERTENSION: ICD-10-CM

## 2021-07-22 DIAGNOSIS — K21.9 GASTROESOPHAGEAL REFLUX DISEASE, UNSPECIFIED WHETHER ESOPHAGITIS PRESENT: Chronic | ICD-10-CM

## 2021-07-22 DIAGNOSIS — F41.0 PANIC ATTACK AS REACTION TO STRESS: ICD-10-CM

## 2021-07-22 DIAGNOSIS — E78.5 HYPERLIPIDEMIA ASSOCIATED WITH TYPE 2 DIABETES MELLITUS: ICD-10-CM

## 2021-07-22 DIAGNOSIS — I10 ESSENTIAL HYPERTENSION: Primary | ICD-10-CM

## 2021-07-22 DIAGNOSIS — F43.0 PANIC ATTACK AS REACTION TO STRESS: ICD-10-CM

## 2021-07-22 LAB
BILIRUB UR QL STRIP: NEGATIVE
CLARITY UR REFRACT.AUTO: ABNORMAL
COLOR UR AUTO: YELLOW
GLUCOSE UR QL STRIP: NEGATIVE
HGB UR QL STRIP: NEGATIVE
KETONES UR QL STRIP: NEGATIVE
LEUKOCYTE ESTERASE UR QL STRIP: NEGATIVE
NITRITE UR QL STRIP: NEGATIVE
PH UR STRIP: 6 [PH] (ref 5–8)
PROT UR QL STRIP: NEGATIVE
SP GR UR STRIP: 1.01 (ref 1–1.03)
URN SPEC COLLECT METH UR: ABNORMAL

## 2021-07-22 PROCEDURE — 1126F PR PAIN SEVERITY QUANTIFIED, NO PAIN PRESENT: ICD-10-PCS | Mod: HCNC,CPTII,S$GLB, | Performed by: FAMILY MEDICINE

## 2021-07-22 PROCEDURE — 3072F LOW RISK FOR RETINOPATHY: CPT | Mod: HCNC,CPTII,S$GLB, | Performed by: FAMILY MEDICINE

## 2021-07-22 PROCEDURE — 3288F PR FALLS RISK ASSESSMENT DOCUMENTED: ICD-10-PCS | Mod: HCNC,CPTII,S$GLB, | Performed by: FAMILY MEDICINE

## 2021-07-22 PROCEDURE — 3075F SYST BP GE 130 - 139MM HG: CPT | Mod: HCNC,CPTII,S$GLB, | Performed by: FAMILY MEDICINE

## 2021-07-22 PROCEDURE — 99214 PR OFFICE/OUTPT VISIT, EST, LEVL IV, 30-39 MIN: ICD-10-PCS | Mod: HCNC,S$GLB,, | Performed by: FAMILY MEDICINE

## 2021-07-22 PROCEDURE — 82043 UR ALBUMIN QUANTITATIVE: CPT | Mod: HCNC | Performed by: FAMILY MEDICINE

## 2021-07-22 PROCEDURE — 3008F PR BODY MASS INDEX (BMI) DOCUMENTED: ICD-10-PCS | Mod: HCNC,CPTII,S$GLB, | Performed by: FAMILY MEDICINE

## 2021-07-22 PROCEDURE — 3079F DIAST BP 80-89 MM HG: CPT | Mod: HCNC,CPTII,S$GLB, | Performed by: FAMILY MEDICINE

## 2021-07-22 PROCEDURE — 99499 RISK ADDL DX/OHS AUDIT: ICD-10-PCS | Mod: S$GLB,,, | Performed by: FAMILY MEDICINE

## 2021-07-22 PROCEDURE — 3288F FALL RISK ASSESSMENT DOCD: CPT | Mod: HCNC,CPTII,S$GLB, | Performed by: FAMILY MEDICINE

## 2021-07-22 PROCEDURE — 3075F PR MOST RECENT SYSTOLIC BLOOD PRESS GE 130-139MM HG: ICD-10-PCS | Mod: HCNC,CPTII,S$GLB, | Performed by: FAMILY MEDICINE

## 2021-07-22 PROCEDURE — 1101F PR PT FALLS ASSESS DOC 0-1 FALLS W/OUT INJ PAST YR: ICD-10-PCS | Mod: HCNC,CPTII,S$GLB, | Performed by: FAMILY MEDICINE

## 2021-07-22 PROCEDURE — 81003 URINALYSIS AUTO W/O SCOPE: CPT | Mod: HCNC | Performed by: FAMILY MEDICINE

## 2021-07-22 PROCEDURE — 3072F PR LOW RISK FOR RETINOPATHY: ICD-10-PCS | Mod: HCNC,CPTII,S$GLB, | Performed by: FAMILY MEDICINE

## 2021-07-22 PROCEDURE — 1159F PR MEDICATION LIST DOCUMENTED IN MEDICAL RECORD: ICD-10-PCS | Mod: HCNC,CPTII,S$GLB, | Performed by: FAMILY MEDICINE

## 2021-07-22 PROCEDURE — 1126F AMNT PAIN NOTED NONE PRSNT: CPT | Mod: HCNC,CPTII,S$GLB, | Performed by: FAMILY MEDICINE

## 2021-07-22 PROCEDURE — 3079F PR MOST RECENT DIASTOLIC BLOOD PRESSURE 80-89 MM HG: ICD-10-PCS | Mod: HCNC,CPTII,S$GLB, | Performed by: FAMILY MEDICINE

## 2021-07-22 PROCEDURE — 99999 PR PBB SHADOW E&M-EST. PATIENT-LVL V: CPT | Mod: PBBFAC,HCNC,, | Performed by: FAMILY MEDICINE

## 2021-07-22 PROCEDURE — 99214 OFFICE O/P EST MOD 30 MIN: CPT | Mod: HCNC,S$GLB,, | Performed by: FAMILY MEDICINE

## 2021-07-22 PROCEDURE — 1159F MED LIST DOCD IN RCRD: CPT | Mod: HCNC,CPTII,S$GLB, | Performed by: FAMILY MEDICINE

## 2021-07-22 PROCEDURE — 99999 PR PBB SHADOW E&M-EST. PATIENT-LVL V: ICD-10-PCS | Mod: PBBFAC,HCNC,, | Performed by: FAMILY MEDICINE

## 2021-07-22 PROCEDURE — 3008F BODY MASS INDEX DOCD: CPT | Mod: HCNC,CPTII,S$GLB, | Performed by: FAMILY MEDICINE

## 2021-07-22 PROCEDURE — 1101F PT FALLS ASSESS-DOCD LE1/YR: CPT | Mod: HCNC,CPTII,S$GLB, | Performed by: FAMILY MEDICINE

## 2021-07-22 PROCEDURE — 3044F PR MOST RECENT HEMOGLOBIN A1C LEVEL <7.0%: ICD-10-PCS | Mod: HCNC,CPTII,S$GLB, | Performed by: FAMILY MEDICINE

## 2021-07-22 PROCEDURE — 3044F HG A1C LEVEL LT 7.0%: CPT | Mod: HCNC,CPTII,S$GLB, | Performed by: FAMILY MEDICINE

## 2021-07-22 PROCEDURE — 82570 ASSAY OF URINE CREATININE: CPT | Mod: HCNC | Performed by: FAMILY MEDICINE

## 2021-07-22 PROCEDURE — 99499 UNLISTED E&M SERVICE: CPT | Mod: S$GLB,,, | Performed by: FAMILY MEDICINE

## 2021-07-22 RX ORDER — LORAZEPAM 0.5 MG/1
0.5 TABLET ORAL DAILY PRN
Qty: 30 TABLET | Refills: 2 | Status: SHIPPED | OUTPATIENT
Start: 2021-07-22 | End: 2021-08-25 | Stop reason: SDUPTHER

## 2021-07-23 LAB
ALBUMIN/CREAT UR: 92.7 UG/MG (ref 0–30)
CREAT UR-MCNC: 124 MG/DL (ref 15–325)
MICROALBUMIN UR DL<=1MG/L-MCNC: 115 UG/ML

## 2021-07-27 ENCOUNTER — TELEPHONE (OUTPATIENT)
Dept: INTERNAL MEDICINE | Facility: CLINIC | Age: 71
End: 2021-07-27

## 2021-07-28 DIAGNOSIS — E11.69 HYPERLIPIDEMIA ASSOCIATED WITH TYPE 2 DIABETES MELLITUS: ICD-10-CM

## 2021-07-28 DIAGNOSIS — E78.5 HYPERLIPIDEMIA ASSOCIATED WITH TYPE 2 DIABETES MELLITUS: ICD-10-CM

## 2021-08-09 ENCOUNTER — PATIENT OUTREACH (OUTPATIENT)
Dept: ADMINISTRATIVE | Facility: OTHER | Age: 71
End: 2021-08-09

## 2021-08-10 ENCOUNTER — OFFICE VISIT (OUTPATIENT)
Dept: OPHTHALMOLOGY | Facility: CLINIC | Age: 71
End: 2021-08-10
Payer: MEDICARE

## 2021-08-10 DIAGNOSIS — E11.9 TYPE 2 DIABETES MELLITUS WITHOUT RETINOPATHY: Primary | ICD-10-CM

## 2021-08-10 DIAGNOSIS — E11.9 CONTROLLED TYPE 2 DIABETES MELLITUS WITHOUT COMPLICATION, WITHOUT LONG-TERM CURRENT USE OF INSULIN: ICD-10-CM

## 2021-08-10 DIAGNOSIS — J84.10 CALCIFIED GRANULOMA OF LUNG: ICD-10-CM

## 2021-08-10 DIAGNOSIS — Z96.1 PSEUDOPHAKIA OF BOTH EYES: ICD-10-CM

## 2021-08-10 DIAGNOSIS — H52.7 REFRACTIVE ERRORS: ICD-10-CM

## 2021-08-10 PROCEDURE — 1159F MED LIST DOCD IN RCRD: CPT | Mod: HCNC,CPTII,S$GLB, | Performed by: OPTOMETRIST

## 2021-08-10 PROCEDURE — 2023F PR DILATED RETINAL EXAM W/O EVID OF RETINOPATHY: ICD-10-PCS | Mod: HCNC,CPTII,S$GLB, | Performed by: OPTOMETRIST

## 2021-08-10 PROCEDURE — 92014 PR EYE EXAM, EST PATIENT,COMPREHESV: ICD-10-PCS | Mod: HCNC,S$GLB,, | Performed by: OPTOMETRIST

## 2021-08-10 PROCEDURE — 92015 DETERMINE REFRACTIVE STATE: CPT | Mod: HCNC,S$GLB,, | Performed by: OPTOMETRIST

## 2021-08-10 PROCEDURE — 99999 PR PBB SHADOW E&M-EST. PATIENT-LVL III: CPT | Mod: PBBFAC,HCNC,, | Performed by: OPTOMETRIST

## 2021-08-10 PROCEDURE — 1159F PR MEDICATION LIST DOCUMENTED IN MEDICAL RECORD: ICD-10-PCS | Mod: HCNC,CPTII,S$GLB, | Performed by: OPTOMETRIST

## 2021-08-10 PROCEDURE — 92014 COMPRE OPH EXAM EST PT 1/>: CPT | Mod: HCNC,S$GLB,, | Performed by: OPTOMETRIST

## 2021-08-10 PROCEDURE — 3051F HG A1C>EQUAL 7.0%<8.0%: CPT | Mod: HCNC,CPTII,S$GLB, | Performed by: OPTOMETRIST

## 2021-08-10 PROCEDURE — 3051F PR MOST RECENT HEMOGLOBIN A1C LEVEL 7.0 - < 8.0%: ICD-10-PCS | Mod: HCNC,CPTII,S$GLB, | Performed by: OPTOMETRIST

## 2021-08-10 PROCEDURE — 99999 PR PBB SHADOW E&M-EST. PATIENT-LVL III: ICD-10-PCS | Mod: PBBFAC,HCNC,, | Performed by: OPTOMETRIST

## 2021-08-10 PROCEDURE — 92015 PR REFRACTION: ICD-10-PCS | Mod: HCNC,S$GLB,, | Performed by: OPTOMETRIST

## 2021-08-10 PROCEDURE — 2023F DILAT RTA XM W/O RTNOPTHY: CPT | Mod: HCNC,CPTII,S$GLB, | Performed by: OPTOMETRIST

## 2021-08-12 ENCOUNTER — PATIENT MESSAGE (OUTPATIENT)
Dept: INTERNAL MEDICINE | Facility: CLINIC | Age: 71
End: 2021-08-12

## 2021-08-14 ENCOUNTER — PATIENT MESSAGE (OUTPATIENT)
Dept: INTERNAL MEDICINE | Facility: CLINIC | Age: 71
End: 2021-08-14

## 2021-08-17 DIAGNOSIS — Z86.69 HISTORY OF MIGRAINE: ICD-10-CM

## 2021-08-17 RX ORDER — AMITRIPTYLINE HYDROCHLORIDE 100 MG/1
100 TABLET ORAL NIGHTLY
Qty: 90 TABLET | Refills: 1 | Status: SHIPPED | OUTPATIENT
Start: 2021-08-17 | End: 2021-12-24

## 2021-08-24 ENCOUNTER — PATIENT MESSAGE (OUTPATIENT)
Dept: INTERNAL MEDICINE | Facility: CLINIC | Age: 71
End: 2021-08-24

## 2021-08-25 ENCOUNTER — TELEPHONE (OUTPATIENT)
Dept: INTERNAL MEDICINE | Facility: CLINIC | Age: 71
End: 2021-08-25

## 2021-08-25 DIAGNOSIS — F41.0 PANIC ATTACK AS REACTION TO STRESS: ICD-10-CM

## 2021-08-25 DIAGNOSIS — J45.20 MILD INTERMITTENT ASTHMA, UNSPECIFIED WHETHER COMPLICATED: ICD-10-CM

## 2021-08-25 DIAGNOSIS — R06.2 WHEEZING: ICD-10-CM

## 2021-08-25 DIAGNOSIS — F43.0 PANIC ATTACK AS REACTION TO STRESS: ICD-10-CM

## 2021-08-25 RX ORDER — ALBUTEROL SULFATE 90 UG/1
2 AEROSOL, METERED RESPIRATORY (INHALATION) EVERY 6 HOURS PRN
Qty: 18 G | Refills: 0 | Status: SHIPPED | OUTPATIENT
Start: 2021-08-25 | End: 2021-09-13

## 2021-08-25 RX ORDER — BENZONATATE 200 MG/1
200 CAPSULE ORAL 3 TIMES DAILY PRN
Qty: 30 CAPSULE | Refills: 0 | Status: SHIPPED | OUTPATIENT
Start: 2021-08-25 | End: 2021-09-04

## 2021-08-25 RX ORDER — LORAZEPAM 0.5 MG/1
0.5 TABLET ORAL DAILY PRN
Qty: 10 TABLET | Refills: 0 | Status: SHIPPED | OUTPATIENT
Start: 2021-08-25 | End: 2021-09-21

## 2021-09-11 DIAGNOSIS — J45.20 MILD INTERMITTENT ASTHMA, UNSPECIFIED WHETHER COMPLICATED: ICD-10-CM

## 2021-09-11 DIAGNOSIS — R06.2 WHEEZING: ICD-10-CM

## 2021-09-13 RX ORDER — ALBUTEROL SULFATE 90 UG/1
2 AEROSOL, METERED RESPIRATORY (INHALATION) EVERY 6 HOURS PRN
Qty: 54 G | Refills: 3 | Status: SHIPPED | OUTPATIENT
Start: 2021-09-13 | End: 2022-02-08

## 2021-09-21 ENCOUNTER — PATIENT MESSAGE (OUTPATIENT)
Dept: INTERNAL MEDICINE | Facility: CLINIC | Age: 71
End: 2021-09-21

## 2021-09-21 DIAGNOSIS — F43.0 PANIC ATTACK AS REACTION TO STRESS: ICD-10-CM

## 2021-09-21 DIAGNOSIS — F41.0 PANIC ATTACK AS REACTION TO STRESS: ICD-10-CM

## 2021-09-21 RX ORDER — LORAZEPAM 0.5 MG/1
TABLET ORAL
Qty: 10 TABLET | Refills: 0 | Status: SHIPPED | OUTPATIENT
Start: 2021-09-21 | End: 2021-09-22 | Stop reason: SDUPTHER

## 2021-09-21 RX ORDER — LORAZEPAM 0.5 MG/1
0.5 TABLET ORAL DAILY PRN
Qty: 30 TABLET | Refills: 0 | Status: CANCELLED | OUTPATIENT
Start: 2021-09-21

## 2021-09-22 ENCOUNTER — TELEPHONE (OUTPATIENT)
Dept: ORTHOPEDICS | Facility: CLINIC | Age: 71
End: 2021-09-22

## 2021-09-22 DIAGNOSIS — F41.0 PANIC ATTACK AS REACTION TO STRESS: ICD-10-CM

## 2021-09-22 DIAGNOSIS — F43.0 PANIC ATTACK AS REACTION TO STRESS: ICD-10-CM

## 2021-09-22 RX ORDER — LORAZEPAM 0.5 MG/1
0.5 TABLET ORAL DAILY PRN
Qty: 30 TABLET | Refills: 0 | Status: SHIPPED | OUTPATIENT
Start: 2021-09-22 | End: 2021-10-25 | Stop reason: SDUPTHER

## 2021-09-24 RX ORDER — BLOOD-GLUCOSE METER
1 EACH MISCELLANEOUS DAILY
Qty: 1 EACH | Refills: 5 | Status: SHIPPED | OUTPATIENT
Start: 2021-09-24 | End: 2022-01-24 | Stop reason: SDUPTHER

## 2021-09-24 RX ORDER — LANCETS 33 GAUGE
1 EACH MISCELLANEOUS DAILY
Qty: 100 EACH | Refills: 0 | Status: SHIPPED | OUTPATIENT
Start: 2021-09-24 | End: 2021-12-13

## 2021-10-01 DIAGNOSIS — E11.9 DM II (DIABETES MELLITUS, TYPE II), CONTROLLED: ICD-10-CM

## 2021-10-01 NOTE — INTERVAL H&P NOTE
The patient has been examined and the H&P has been reviewed:    I concur with the findings and no changes have occurred since H&P was written.    Anesthesia/Surgery risks, benefits and alternative options discussed and understood by patient/family.          There are no hospital problems to display for this patient.     [Fever] : no fever [Chills] : no chills [Chest Pain] : no chest pain [Palpitations] : no palpitations [Lower Ext Edema] : no lower extremity edema [Shortness Of Breath] : no shortness of breath [Wheezing] : no wheezing [Cough] : no cough [Dyspnea on Exertion] : not dyspnea on exertion [Abdominal Pain] : no abdominal pain [Nausea] : no nausea [Diarrhea] : no diarrhea [Vomiting] : no vomiting [Anxiety] : no anxiety [Depression] : no depression [Negative] : Integumentary [FreeTextEntry2] : Gained 20 pounds since last visit

## 2021-10-03 RX ORDER — LANCETS
EACH MISCELLANEOUS
Refills: 0 | OUTPATIENT
Start: 2021-10-03

## 2021-10-03 RX ORDER — BLOOD GLUCOSE CONTROL HIGH,LOW
EACH MISCELLANEOUS
Refills: 0 | OUTPATIENT
Start: 2021-10-03

## 2021-10-03 RX ORDER — DEXTROSE 4 G
TABLET,CHEWABLE ORAL
Refills: 0 | OUTPATIENT
Start: 2021-10-03

## 2021-10-03 RX ORDER — ISOPROPYL ALCOHOL 70 ML/100ML
SWAB TOPICAL
Refills: 0 | OUTPATIENT
Start: 2021-10-03

## 2021-10-13 RX ORDER — LANCETS
EACH MISCELLANEOUS
Qty: 100 EACH | Refills: 3 | Status: SHIPPED | OUTPATIENT
Start: 2021-10-13 | End: 2021-12-13 | Stop reason: SDUPTHER

## 2021-10-13 RX ORDER — INSULIN PUMP SYRINGE, 3 ML
EACH MISCELLANEOUS
Qty: 1 EACH | Refills: 0 | Status: SHIPPED | OUTPATIENT
Start: 2021-10-13 | End: 2022-02-07

## 2021-10-14 DIAGNOSIS — E11.9 CONTROLLED TYPE 2 DIABETES MELLITUS WITHOUT COMPLICATION, WITHOUT LONG-TERM CURRENT USE OF INSULIN: ICD-10-CM

## 2021-10-14 RX ORDER — METFORMIN HYDROCHLORIDE 500 MG/1
1000 TABLET, EXTENDED RELEASE ORAL DAILY
Qty: 180 TABLET | Refills: 0 | Status: SHIPPED | OUTPATIENT
Start: 2021-10-14 | End: 2021-12-27

## 2021-10-23 ENCOUNTER — PATIENT MESSAGE (OUTPATIENT)
Dept: INTERNAL MEDICINE | Facility: CLINIC | Age: 71
End: 2021-10-23
Payer: MEDICARE

## 2021-10-23 DIAGNOSIS — F43.0 PANIC ATTACK AS REACTION TO STRESS: ICD-10-CM

## 2021-10-23 DIAGNOSIS — G47.00 INSOMNIA, UNSPECIFIED TYPE: ICD-10-CM

## 2021-10-23 DIAGNOSIS — F41.0 PANIC ATTACK AS REACTION TO STRESS: ICD-10-CM

## 2021-10-25 ENCOUNTER — TELEPHONE (OUTPATIENT)
Dept: PODIATRY | Facility: CLINIC | Age: 71
End: 2021-10-25
Payer: MEDICARE

## 2021-10-25 ENCOUNTER — PATIENT MESSAGE (OUTPATIENT)
Dept: INTERNAL MEDICINE | Facility: CLINIC | Age: 71
End: 2021-10-25
Payer: MEDICARE

## 2021-10-25 RX ORDER — ESZOPICLONE 3 MG/1
TABLET, FILM COATED ORAL
Qty: 90 TABLET | Refills: 1 | Status: SHIPPED | OUTPATIENT
Start: 2021-10-25 | End: 2022-04-11 | Stop reason: SDUPTHER

## 2021-10-25 RX ORDER — LORAZEPAM 0.5 MG/1
0.5 TABLET ORAL DAILY PRN
Qty: 30 TABLET | Refills: 0 | Status: SHIPPED | OUTPATIENT
Start: 2021-10-25 | End: 2021-11-19 | Stop reason: SDUPTHER

## 2021-11-03 ENCOUNTER — OFFICE VISIT (OUTPATIENT)
Dept: ORTHOPEDICS | Facility: CLINIC | Age: 71
End: 2021-11-03
Payer: MEDICARE

## 2021-11-03 ENCOUNTER — HOSPITAL ENCOUNTER (OUTPATIENT)
Dept: RADIOLOGY | Facility: HOSPITAL | Age: 71
Discharge: HOME | End: 2021-11-03
Attending: FAMILY MEDICINE
Payer: MEDICARE

## 2021-11-03 ENCOUNTER — OFFICE VISIT (OUTPATIENT)
Dept: INTERNAL MEDICINE | Facility: CLINIC | Age: 71
End: 2021-11-03
Payer: MEDICARE

## 2021-11-03 ENCOUNTER — OFFICE VISIT (OUTPATIENT)
Dept: PODIATRY | Facility: CLINIC | Age: 71
End: 2021-11-03
Payer: MEDICARE

## 2021-11-03 VITALS
BODY MASS INDEX: 36.29 KG/M2 | TEMPERATURE: 98 F | HEART RATE: 106 BPM | DIASTOLIC BLOOD PRESSURE: 80 MMHG | WEIGHT: 204 LBS | DIASTOLIC BLOOD PRESSURE: 80 MMHG | HEIGHT: 63 IN | WEIGHT: 204.81 LBS | HEART RATE: 106 BPM | BODY MASS INDEX: 36.14 KG/M2 | SYSTOLIC BLOOD PRESSURE: 114 MMHG | SYSTOLIC BLOOD PRESSURE: 114 MMHG | OXYGEN SATURATION: 96 % | HEIGHT: 63 IN

## 2021-11-03 VITALS — HEIGHT: 63 IN | BODY MASS INDEX: 36.14 KG/M2 | WEIGHT: 204 LBS

## 2021-11-03 DIAGNOSIS — E11.69 HYPERLIPIDEMIA ASSOCIATED WITH TYPE 2 DIABETES MELLITUS: Chronic | ICD-10-CM

## 2021-11-03 DIAGNOSIS — E11.9 CONTROLLED TYPE 2 DIABETES MELLITUS WITHOUT COMPLICATION, WITHOUT LONG-TERM CURRENT USE OF INSULIN: ICD-10-CM

## 2021-11-03 DIAGNOSIS — I15.9 SECONDARY HYPERTENSION: Primary | ICD-10-CM

## 2021-11-03 DIAGNOSIS — G56.01 CARPAL TUNNEL SYNDROME OF RIGHT WRIST: Primary | ICD-10-CM

## 2021-11-03 DIAGNOSIS — I47.10 PAROXYSMAL SVT (SUPRAVENTRICULAR TACHYCARDIA): ICD-10-CM

## 2021-11-03 DIAGNOSIS — B35.1 ONYCHOMYCOSIS: ICD-10-CM

## 2021-11-03 DIAGNOSIS — J45.20 MILD INTERMITTENT ASTHMA, UNSPECIFIED WHETHER COMPLICATED: ICD-10-CM

## 2021-11-03 DIAGNOSIS — J84.10 CALCIFIED GRANULOMA OF LUNG: ICD-10-CM

## 2021-11-03 DIAGNOSIS — M79.675 PAIN IN TOES OF BOTH FEET: ICD-10-CM

## 2021-11-03 DIAGNOSIS — M79.674 PAIN IN TOES OF BOTH FEET: ICD-10-CM

## 2021-11-03 DIAGNOSIS — I10 ESSENTIAL HYPERTENSION: Chronic | ICD-10-CM

## 2021-11-03 DIAGNOSIS — R05.9 COUGH: ICD-10-CM

## 2021-11-03 DIAGNOSIS — E78.5 HYPERLIPIDEMIA ASSOCIATED WITH TYPE 2 DIABETES MELLITUS: Chronic | ICD-10-CM

## 2021-11-03 DIAGNOSIS — E11.9 COMPREHENSIVE DIABETIC FOOT EXAMINATION, TYPE 2 DM, ENCOUNTER FOR: Primary | ICD-10-CM

## 2021-11-03 PROCEDURE — 3072F LOW RISK FOR RETINOPATHY: CPT | Mod: CPTII,S$GLB,, | Performed by: ORTHOPAEDIC SURGERY

## 2021-11-03 PROCEDURE — 99499 UNLISTED E&M SERVICE: CPT | Mod: S$GLB,,, | Performed by: FAMILY MEDICINE

## 2021-11-03 PROCEDURE — 99213 PR OFFICE/OUTPT VISIT, EST, LEVL III, 20-29 MIN: ICD-10-PCS | Mod: 25,S$GLB,, | Performed by: PODIATRIST

## 2021-11-03 PROCEDURE — 1101F PT FALLS ASSESS-DOCD LE1/YR: CPT | Mod: CPTII,S$GLB,, | Performed by: PODIATRIST

## 2021-11-03 PROCEDURE — 99999 PR PBB SHADOW E&M-EST. PATIENT-LVL IV: ICD-10-PCS | Mod: PBBFAC,,, | Performed by: ORTHOPAEDIC SURGERY

## 2021-11-03 PROCEDURE — 4010F ACE/ARB THERAPY RXD/TAKEN: CPT | Mod: CPTII,S$GLB,, | Performed by: ORTHOPAEDIC SURGERY

## 2021-11-03 PROCEDURE — 99999 PR PBB SHADOW E&M-EST. PATIENT-LVL IV: ICD-10-PCS | Mod: PBBFAC,,, | Performed by: PODIATRIST

## 2021-11-03 PROCEDURE — 1159F PR MEDICATION LIST DOCUMENTED IN MEDICAL RECORD: ICD-10-PCS | Mod: CPTII,S$GLB,, | Performed by: ORTHOPAEDIC SURGERY

## 2021-11-03 PROCEDURE — 99999 PR PBB SHADOW E&M-EST. PATIENT-LVL IV: CPT | Mod: PBBFAC,,, | Performed by: PODIATRIST

## 2021-11-03 PROCEDURE — 1160F RVW MEDS BY RX/DR IN RCRD: CPT | Mod: CPTII,S$GLB,, | Performed by: ORTHOPAEDIC SURGERY

## 2021-11-03 PROCEDURE — 3008F PR BODY MASS INDEX (BMI) DOCUMENTED: ICD-10-PCS | Mod: CPTII,S$GLB,, | Performed by: PODIATRIST

## 2021-11-03 PROCEDURE — 3288F PR FALLS RISK ASSESSMENT DOCUMENTED: ICD-10-PCS | Mod: CPTII,S$GLB,, | Performed by: FAMILY MEDICINE

## 2021-11-03 PROCEDURE — 3288F FALL RISK ASSESSMENT DOCD: CPT | Mod: CPTII,S$GLB,, | Performed by: PODIATRIST

## 2021-11-03 PROCEDURE — 3072F LOW RISK FOR RETINOPATHY: CPT | Mod: CPTII,S$GLB,, | Performed by: PODIATRIST

## 2021-11-03 PROCEDURE — 3051F HG A1C>EQUAL 7.0%<8.0%: CPT | Mod: CPTII,S$GLB,, | Performed by: ORTHOPAEDIC SURGERY

## 2021-11-03 PROCEDURE — 3060F POS MICROALBUMINURIA REV: CPT | Mod: CPTII,S$GLB,, | Performed by: FAMILY MEDICINE

## 2021-11-03 PROCEDURE — 99213 OFFICE O/P EST LOW 20 MIN: CPT | Mod: 25,S$GLB,, | Performed by: PODIATRIST

## 2021-11-03 PROCEDURE — 71046 X-RAY EXAM CHEST 2 VIEWS: CPT | Mod: 26,,, | Performed by: RADIOLOGY

## 2021-11-03 PROCEDURE — 11720 PR DEBRIDEMENT OF NAIL(S), 1-5: ICD-10-PCS | Mod: S$GLB,,, | Performed by: PODIATRIST

## 2021-11-03 PROCEDURE — 1101F PR PT FALLS ASSESS DOC 0-1 FALLS W/OUT INJ PAST YR: ICD-10-PCS | Mod: CPTII,S$GLB,, | Performed by: PODIATRIST

## 2021-11-03 PROCEDURE — 3060F PR POS MICROALBUMINURIA RESULT DOCUMENTED/REVIEW: ICD-10-PCS | Mod: CPTII,S$GLB,, | Performed by: PODIATRIST

## 2021-11-03 PROCEDURE — 3051F PR MOST RECENT HEMOGLOBIN A1C LEVEL 7.0 - < 8.0%: ICD-10-PCS | Mod: CPTII,S$GLB,, | Performed by: ORTHOPAEDIC SURGERY

## 2021-11-03 PROCEDURE — 1160F PR REVIEW ALL MEDS BY PRESCRIBER/CLIN PHARMACIST DOCUMENTED: ICD-10-PCS | Mod: CPTII,S$GLB,, | Performed by: PODIATRIST

## 2021-11-03 PROCEDURE — 1159F MED LIST DOCD IN RCRD: CPT | Mod: CPTII,S$GLB,, | Performed by: PODIATRIST

## 2021-11-03 PROCEDURE — 11720 DEBRIDE NAIL 1-5: CPT | Mod: S$GLB,,, | Performed by: PODIATRIST

## 2021-11-03 PROCEDURE — 1159F PR MEDICATION LIST DOCUMENTED IN MEDICAL RECORD: ICD-10-PCS | Mod: CPTII,S$GLB,, | Performed by: PODIATRIST

## 2021-11-03 PROCEDURE — 1125F AMNT PAIN NOTED PAIN PRSNT: CPT | Mod: CPTII,S$GLB,, | Performed by: ORTHOPAEDIC SURGERY

## 2021-11-03 PROCEDURE — 3288F FALL RISK ASSESSMENT DOCD: CPT | Mod: CPTII,S$GLB,, | Performed by: ORTHOPAEDIC SURGERY

## 2021-11-03 PROCEDURE — 3060F PR POS MICROALBUMINURIA RESULT DOCUMENTED/REVIEW: ICD-10-PCS | Mod: CPTII,S$GLB,, | Performed by: ORTHOPAEDIC SURGERY

## 2021-11-03 PROCEDURE — 3051F PR MOST RECENT HEMOGLOBIN A1C LEVEL 7.0 - < 8.0%: ICD-10-PCS | Mod: CPTII,S$GLB,, | Performed by: FAMILY MEDICINE

## 2021-11-03 PROCEDURE — 1126F AMNT PAIN NOTED NONE PRSNT: CPT | Mod: CPTII,S$GLB,, | Performed by: PODIATRIST

## 2021-11-03 PROCEDURE — 99214 PR OFFICE/OUTPT VISIT, EST, LEVL IV, 30-39 MIN: ICD-10-PCS | Mod: S$GLB,,, | Performed by: FAMILY MEDICINE

## 2021-11-03 PROCEDURE — 3066F NEPHROPATHY DOC TX: CPT | Mod: CPTII,S$GLB,, | Performed by: FAMILY MEDICINE

## 2021-11-03 PROCEDURE — 99999 PR PBB SHADOW E&M-EST. PATIENT-LVL V: CPT | Mod: PBBFAC,,, | Performed by: FAMILY MEDICINE

## 2021-11-03 PROCEDURE — 3079F DIAST BP 80-89 MM HG: CPT | Mod: CPTII,S$GLB,, | Performed by: FAMILY MEDICINE

## 2021-11-03 PROCEDURE — 99213 OFFICE O/P EST LOW 20 MIN: CPT | Mod: 25,S$GLB,, | Performed by: ORTHOPAEDIC SURGERY

## 2021-11-03 PROCEDURE — 1101F PT FALLS ASSESS-DOCD LE1/YR: CPT | Mod: CPTII,S$GLB,, | Performed by: ORTHOPAEDIC SURGERY

## 2021-11-03 PROCEDURE — 1126F AMNT PAIN NOTED NONE PRSNT: CPT | Mod: CPTII,S$GLB,, | Performed by: FAMILY MEDICINE

## 2021-11-03 PROCEDURE — 3072F PR LOW RISK FOR RETINOPATHY: ICD-10-PCS | Mod: CPTII,S$GLB,, | Performed by: PODIATRIST

## 2021-11-03 PROCEDURE — 3008F BODY MASS INDEX DOCD: CPT | Mod: CPTII,S$GLB,, | Performed by: ORTHOPAEDIC SURGERY

## 2021-11-03 PROCEDURE — 3079F PR MOST RECENT DIASTOLIC BLOOD PRESSURE 80-89 MM HG: ICD-10-PCS | Mod: CPTII,S$GLB,, | Performed by: ORTHOPAEDIC SURGERY

## 2021-11-03 PROCEDURE — 71046 XR CHEST PA AND LATERAL: ICD-10-PCS | Mod: 26,,, | Performed by: RADIOLOGY

## 2021-11-03 PROCEDURE — 1160F PR REVIEW ALL MEDS BY PRESCRIBER/CLIN PHARMACIST DOCUMENTED: ICD-10-PCS | Mod: CPTII,S$GLB,, | Performed by: ORTHOPAEDIC SURGERY

## 2021-11-03 PROCEDURE — 99999 PR PBB SHADOW E&M-EST. PATIENT-LVL IV: CPT | Mod: PBBFAC,,, | Performed by: ORTHOPAEDIC SURGERY

## 2021-11-03 PROCEDURE — 1159F PR MEDICATION LIST DOCUMENTED IN MEDICAL RECORD: ICD-10-PCS | Mod: CPTII,S$GLB,, | Performed by: FAMILY MEDICINE

## 2021-11-03 PROCEDURE — 99213 PR OFFICE/OUTPT VISIT, EST, LEVL III, 20-29 MIN: ICD-10-PCS | Mod: 25,S$GLB,, | Performed by: ORTHOPAEDIC SURGERY

## 2021-11-03 PROCEDURE — 3074F PR MOST RECENT SYSTOLIC BLOOD PRESSURE < 130 MM HG: ICD-10-PCS | Mod: CPTII,S$GLB,, | Performed by: FAMILY MEDICINE

## 2021-11-03 PROCEDURE — 1159F MED LIST DOCD IN RCRD: CPT | Mod: CPTII,S$GLB,, | Performed by: ORTHOPAEDIC SURGERY

## 2021-11-03 PROCEDURE — 4010F PR ACE/ARB THEARPY RXD/TAKEN: ICD-10-PCS | Mod: CPTII,S$GLB,, | Performed by: PODIATRIST

## 2021-11-03 PROCEDURE — 3066F PR DOCUMENTATION OF TREATMENT FOR NEPHROPATHY: ICD-10-PCS | Mod: CPTII,S$GLB,, | Performed by: PODIATRIST

## 2021-11-03 PROCEDURE — 4010F PR ACE/ARB THEARPY RXD/TAKEN: ICD-10-PCS | Mod: CPTII,S$GLB,, | Performed by: ORTHOPAEDIC SURGERY

## 2021-11-03 PROCEDURE — 3066F NEPHROPATHY DOC TX: CPT | Mod: CPTII,S$GLB,, | Performed by: PODIATRIST

## 2021-11-03 PROCEDURE — 1101F PT FALLS ASSESS-DOCD LE1/YR: CPT | Mod: CPTII,S$GLB,, | Performed by: FAMILY MEDICINE

## 2021-11-03 PROCEDURE — 4010F PR ACE/ARB THEARPY RXD/TAKEN: ICD-10-PCS | Mod: CPTII,S$GLB,, | Performed by: FAMILY MEDICINE

## 2021-11-03 PROCEDURE — 1101F PR PT FALLS ASSESS DOC 0-1 FALLS W/OUT INJ PAST YR: ICD-10-PCS | Mod: CPTII,S$GLB,, | Performed by: ORTHOPAEDIC SURGERY

## 2021-11-03 PROCEDURE — 1159F MED LIST DOCD IN RCRD: CPT | Mod: CPTII,S$GLB,, | Performed by: FAMILY MEDICINE

## 2021-11-03 PROCEDURE — 3060F POS MICROALBUMINURIA REV: CPT | Mod: CPTII,S$GLB,, | Performed by: ORTHOPAEDIC SURGERY

## 2021-11-03 PROCEDURE — 99499 RISK ADDL DX/OHS AUDIT: ICD-10-PCS | Mod: S$GLB,,, | Performed by: FAMILY MEDICINE

## 2021-11-03 PROCEDURE — 3074F PR MOST RECENT SYSTOLIC BLOOD PRESSURE < 130 MM HG: ICD-10-PCS | Mod: CPTII,S$GLB,, | Performed by: ORTHOPAEDIC SURGERY

## 2021-11-03 PROCEDURE — 3079F PR MOST RECENT DIASTOLIC BLOOD PRESSURE 80-89 MM HG: ICD-10-PCS | Mod: CPTII,S$GLB,, | Performed by: FAMILY MEDICINE

## 2021-11-03 PROCEDURE — 99999 PR PBB SHADOW E&M-EST. PATIENT-LVL V: ICD-10-PCS | Mod: PBBFAC,,, | Performed by: FAMILY MEDICINE

## 2021-11-03 PROCEDURE — 20526 THER INJECTION CARP TUNNEL: CPT | Mod: RT,S$GLB,, | Performed by: ORTHOPAEDIC SURGERY

## 2021-11-03 PROCEDURE — 1101F PR PT FALLS ASSESS DOC 0-1 FALLS W/OUT INJ PAST YR: ICD-10-PCS | Mod: CPTII,S$GLB,, | Performed by: FAMILY MEDICINE

## 2021-11-03 PROCEDURE — 3079F DIAST BP 80-89 MM HG: CPT | Mod: CPTII,S$GLB,, | Performed by: ORTHOPAEDIC SURGERY

## 2021-11-03 PROCEDURE — 3008F PR BODY MASS INDEX (BMI) DOCUMENTED: ICD-10-PCS | Mod: CPTII,S$GLB,, | Performed by: ORTHOPAEDIC SURGERY

## 2021-11-03 PROCEDURE — 4010F ACE/ARB THERAPY RXD/TAKEN: CPT | Mod: CPTII,S$GLB,, | Performed by: FAMILY MEDICINE

## 2021-11-03 PROCEDURE — 3008F BODY MASS INDEX DOCD: CPT | Mod: CPTII,S$GLB,, | Performed by: FAMILY MEDICINE

## 2021-11-03 PROCEDURE — 3008F BODY MASS INDEX DOCD: CPT | Mod: CPTII,S$GLB,, | Performed by: PODIATRIST

## 2021-11-03 PROCEDURE — 3072F LOW RISK FOR RETINOPATHY: CPT | Mod: CPTII,S$GLB,, | Performed by: FAMILY MEDICINE

## 2021-11-03 PROCEDURE — 1125F PR PAIN SEVERITY QUANTIFIED, PAIN PRESENT: ICD-10-PCS | Mod: CPTII,S$GLB,, | Performed by: ORTHOPAEDIC SURGERY

## 2021-11-03 PROCEDURE — 3008F PR BODY MASS INDEX (BMI) DOCUMENTED: ICD-10-PCS | Mod: CPTII,S$GLB,, | Performed by: FAMILY MEDICINE

## 2021-11-03 PROCEDURE — 3074F SYST BP LT 130 MM HG: CPT | Mod: CPTII,S$GLB,, | Performed by: FAMILY MEDICINE

## 2021-11-03 PROCEDURE — 3288F PR FALLS RISK ASSESSMENT DOCUMENTED: ICD-10-PCS | Mod: CPTII,S$GLB,, | Performed by: PODIATRIST

## 2021-11-03 PROCEDURE — 3060F PR POS MICROALBUMINURIA RESULT DOCUMENTED/REVIEW: ICD-10-PCS | Mod: CPTII,S$GLB,, | Performed by: FAMILY MEDICINE

## 2021-11-03 PROCEDURE — 3288F PR FALLS RISK ASSESSMENT DOCUMENTED: ICD-10-PCS | Mod: CPTII,S$GLB,, | Performed by: ORTHOPAEDIC SURGERY

## 2021-11-03 PROCEDURE — 1126F PR PAIN SEVERITY QUANTIFIED, NO PAIN PRESENT: ICD-10-PCS | Mod: CPTII,S$GLB,, | Performed by: FAMILY MEDICINE

## 2021-11-03 PROCEDURE — 3072F PR LOW RISK FOR RETINOPATHY: ICD-10-PCS | Mod: CPTII,S$GLB,, | Performed by: FAMILY MEDICINE

## 2021-11-03 PROCEDURE — 1126F PR PAIN SEVERITY QUANTIFIED, NO PAIN PRESENT: ICD-10-PCS | Mod: CPTII,S$GLB,, | Performed by: PODIATRIST

## 2021-11-03 PROCEDURE — 1160F RVW MEDS BY RX/DR IN RCRD: CPT | Mod: CPTII,S$GLB,, | Performed by: PODIATRIST

## 2021-11-03 PROCEDURE — 3072F PR LOW RISK FOR RETINOPATHY: ICD-10-PCS | Mod: CPTII,S$GLB,, | Performed by: ORTHOPAEDIC SURGERY

## 2021-11-03 PROCEDURE — 3074F SYST BP LT 130 MM HG: CPT | Mod: CPTII,S$GLB,, | Performed by: ORTHOPAEDIC SURGERY

## 2021-11-03 PROCEDURE — 3066F PR DOCUMENTATION OF TREATMENT FOR NEPHROPATHY: ICD-10-PCS | Mod: CPTII,S$GLB,, | Performed by: ORTHOPAEDIC SURGERY

## 2021-11-03 PROCEDURE — 99214 OFFICE O/P EST MOD 30 MIN: CPT | Mod: S$GLB,,, | Performed by: FAMILY MEDICINE

## 2021-11-03 PROCEDURE — 3066F PR DOCUMENTATION OF TREATMENT FOR NEPHROPATHY: ICD-10-PCS | Mod: CPTII,S$GLB,, | Performed by: FAMILY MEDICINE

## 2021-11-03 PROCEDURE — 20526 CARPAL TUNNEL: ICD-10-PCS | Mod: RT,S$GLB,, | Performed by: ORTHOPAEDIC SURGERY

## 2021-11-03 PROCEDURE — 3066F NEPHROPATHY DOC TX: CPT | Mod: CPTII,S$GLB,, | Performed by: ORTHOPAEDIC SURGERY

## 2021-11-03 PROCEDURE — 3060F POS MICROALBUMINURIA REV: CPT | Mod: CPTII,S$GLB,, | Performed by: PODIATRIST

## 2021-11-03 PROCEDURE — 4010F ACE/ARB THERAPY RXD/TAKEN: CPT | Mod: CPTII,S$GLB,, | Performed by: PODIATRIST

## 2021-11-03 PROCEDURE — 3288F FALL RISK ASSESSMENT DOCD: CPT | Mod: CPTII,S$GLB,, | Performed by: FAMILY MEDICINE

## 2021-11-03 PROCEDURE — 3051F HG A1C>EQUAL 7.0%<8.0%: CPT | Mod: CPTII,S$GLB,, | Performed by: FAMILY MEDICINE

## 2021-11-03 PROCEDURE — 3051F HG A1C>EQUAL 7.0%<8.0%: CPT | Mod: CPTII,S$GLB,, | Performed by: PODIATRIST

## 2021-11-03 PROCEDURE — 71046 X-RAY EXAM CHEST 2 VIEWS: CPT | Mod: TC

## 2021-11-03 PROCEDURE — 3051F PR MOST RECENT HEMOGLOBIN A1C LEVEL 7.0 - < 8.0%: ICD-10-PCS | Mod: CPTII,S$GLB,, | Performed by: PODIATRIST

## 2021-11-03 RX ORDER — LOSARTAN POTASSIUM 25 MG/1
25 TABLET ORAL DAILY
Qty: 90 TABLET | Refills: 3 | Status: SHIPPED | OUTPATIENT
Start: 2021-11-03 | End: 2022-08-30

## 2021-11-03 RX ORDER — TRIAMCINOLONE ACETONIDE 40 MG/ML
40 INJECTION, SUSPENSION INTRA-ARTICULAR; INTRAMUSCULAR
Status: DISCONTINUED | OUTPATIENT
Start: 2021-11-03 | End: 2021-11-03 | Stop reason: HOSPADM

## 2021-11-03 RX ADMIN — TRIAMCINOLONE ACETONIDE 40 MG: 40 INJECTION, SUSPENSION INTRA-ARTICULAR; INTRAMUSCULAR at 02:11

## 2021-11-19 ENCOUNTER — PATIENT MESSAGE (OUTPATIENT)
Dept: INTERNAL MEDICINE | Facility: CLINIC | Age: 71
End: 2021-11-19
Payer: MEDICARE

## 2021-11-19 DIAGNOSIS — F43.0 PANIC ATTACK AS REACTION TO STRESS: ICD-10-CM

## 2021-11-19 DIAGNOSIS — F41.0 PANIC ATTACK AS REACTION TO STRESS: ICD-10-CM

## 2021-11-21 RX ORDER — LORAZEPAM 0.5 MG/1
0.5 TABLET ORAL DAILY PRN
Qty: 30 TABLET | Refills: 0 | Status: SHIPPED | OUTPATIENT
Start: 2021-11-21 | End: 2021-12-21 | Stop reason: SDUPTHER

## 2021-12-13 RX ORDER — LANCETS 33 GAUGE
EACH MISCELLANEOUS
Qty: 100 EACH | Refills: 3 | Status: SHIPPED | OUTPATIENT
Start: 2021-12-13 | End: 2022-11-30

## 2021-12-21 ENCOUNTER — PATIENT MESSAGE (OUTPATIENT)
Dept: INTERNAL MEDICINE | Facility: CLINIC | Age: 71
End: 2021-12-21
Payer: MEDICARE

## 2021-12-21 DIAGNOSIS — F43.0 PANIC ATTACK AS REACTION TO STRESS: ICD-10-CM

## 2021-12-21 DIAGNOSIS — F41.0 PANIC ATTACK AS REACTION TO STRESS: ICD-10-CM

## 2021-12-22 DIAGNOSIS — Z86.69 HISTORY OF MIGRAINE: ICD-10-CM

## 2021-12-22 RX ORDER — LORAZEPAM 0.5 MG/1
0.5 TABLET ORAL DAILY PRN
Qty: 30 TABLET | Refills: 0 | Status: SHIPPED | OUTPATIENT
Start: 2021-12-22 | End: 2022-01-24 | Stop reason: SDUPTHER

## 2021-12-24 RX ORDER — AMITRIPTYLINE HYDROCHLORIDE 100 MG/1
TABLET ORAL
Qty: 90 TABLET | Refills: 1 | Status: SHIPPED | OUTPATIENT
Start: 2021-12-24 | End: 2022-02-08

## 2021-12-29 DIAGNOSIS — E11.9 CONTROLLED TYPE 2 DIABETES MELLITUS WITHOUT COMPLICATION, WITHOUT LONG-TERM CURRENT USE OF INSULIN: ICD-10-CM

## 2022-01-06 ENCOUNTER — PATIENT MESSAGE (OUTPATIENT)
Dept: INTERNAL MEDICINE | Facility: CLINIC | Age: 72
End: 2022-01-06
Payer: MEDICARE

## 2022-01-06 DIAGNOSIS — E11.9 CONTROLLED TYPE 2 DIABETES MELLITUS WITHOUT COMPLICATION, WITHOUT LONG-TERM CURRENT USE OF INSULIN: ICD-10-CM

## 2022-01-09 DIAGNOSIS — Z12.31 ENCOUNTER FOR SCREENING MAMMOGRAM FOR MALIGNANT NEOPLASM OF BREAST: ICD-10-CM

## 2022-01-09 DIAGNOSIS — Z12.39 BREAST SCREENING: Primary | ICD-10-CM

## 2022-01-12 DIAGNOSIS — E11.9 CONTROLLED TYPE 2 DIABETES MELLITUS WITHOUT COMPLICATION, WITHOUT LONG-TERM CURRENT USE OF INSULIN: ICD-10-CM

## 2022-01-19 NOTE — TELEPHONE ENCOUNTER
No new care gaps identified.  Powered by [employer]. Reference number: [id number].   1/19/2022 8:25:36 AM CST

## 2022-01-21 NOTE — PATIENT INSTRUCTIONS
Restart protein drinks.    One multivitamin (centrum silver) daily with a meal/food.  OK to restart calcium citrate taken 3 times daily with food (945 mg).    Continue to hold the B-complex and the B12 for now.    You may decrease metformin to 2 with PM meal.  Use the albuterol inhaler 2 puffs 3 x day: morning, midday and evening.    Sty (or Stye)  A sty is an infection of the oil gland of the eyelid. It may develop into a small pocket of pus (abscess). This can cause pain, redness, and swelling. In early stages, styes are treated with antibiotic cream, eye drops, or warm packs (small towels soaked in warm water). More severe cases may need to be opened and drained by a health care provider.  Home care  · Eye drops or ointment are usually prescribed to treat the infection. Use these as directed.   ¨ Artificial tears may also be used to lubricate the eye and make it more comfortable. These may be purchased without a prescription.   ¨ Talk to your health care provider before using any over-the-counter treatment for a sty.  · Apply a warm, damp towel to the affected eye for at least 5 minutes, 3 to 4 times a day for a week. Warm compresses open the pores and speed the healing. If the compresses are too hot, they may burn your eyelid.  · Sometimes the sty will drain with this treatment alone. If this happens, continue the antibiotic until all the redness and swelling are gone.  · Wash your hands before and after touching the infected eye to avoid spreading the infection.  · Do not squeeze or try to puncture the sty.  Follow-up care  Follow up with your health care provider, or as advised.   When to seek medical advice  Call your health care provider right away if you have:  · Increase in swelling or redness around the eyelid after 48 to 72 hours  · Increase in eye pain or the eyelid blisters  · Increase in warmth--the eyelid feels hot  · Drainage of blood or thick pus from the sty  · Blister on the eyelid  · Inability  to open the eyelid due to swelling  · Fever  ¨ 1 degree above your normal temperature lasting for 24 to 48 hours, or  ¨ Whatever your health care provider told you to report based on your medical condition  · Vision changes  · Headache or stiff neck  · Recurrence of the sty  Date Last Reviewed: 6/14/2015  © 8014-4151 [employer]. [address] [place] [address]. All rights reserved. This information is not intended as a substitute for professional medical care. Always follow your healthcare professional's instructions.        Step-by-Step  Using an Inhaler Without a Spacer       Date Last Reviewed: 5/29/2015  © 1581-9481 [employer]. [address] [place] [address]. All rights reserved. This information is not intended as a substitute for professional medical care. Always follow your healthcare professional's instructions.         buttocks pain s/t abscess/yes

## 2022-01-22 ENCOUNTER — PATIENT MESSAGE (OUTPATIENT)
Dept: INTERNAL MEDICINE | Facility: CLINIC | Age: 72
End: 2022-01-22
Payer: MEDICARE

## 2022-01-22 DIAGNOSIS — E11.65 UNCONTROLLED TYPE 2 DIABETES MELLITUS WITH HYPERGLYCEMIA: ICD-10-CM

## 2022-01-22 DIAGNOSIS — I10 PRIMARY HYPERTENSION: Primary | ICD-10-CM

## 2022-01-24 ENCOUNTER — PATIENT MESSAGE (OUTPATIENT)
Dept: INTERNAL MEDICINE | Facility: CLINIC | Age: 72
End: 2022-01-24
Payer: MEDICARE

## 2022-01-24 DIAGNOSIS — F43.0 PANIC ATTACK AS REACTION TO STRESS: ICD-10-CM

## 2022-01-24 DIAGNOSIS — F41.0 PANIC ATTACK AS REACTION TO STRESS: ICD-10-CM

## 2022-01-24 RX ORDER — LORAZEPAM 0.5 MG/1
0.5 TABLET ORAL DAILY PRN
Qty: 30 TABLET | Refills: 0 | Status: SHIPPED | OUTPATIENT
Start: 2022-01-24 | End: 2022-01-25 | Stop reason: SDUPTHER

## 2022-01-24 RX ORDER — BLOOD-GLUCOSE METER
1 EACH MISCELLANEOUS DAILY
Qty: 1 EACH | Refills: 5 | Status: SHIPPED | OUTPATIENT
Start: 2022-01-24

## 2022-01-24 RX ORDER — LORAZEPAM 0.5 MG/1
0.5 TABLET ORAL DAILY PRN
Qty: 30 TABLET | Refills: 0 | OUTPATIENT
Start: 2022-01-24

## 2022-01-24 NOTE — TELEPHONE ENCOUNTER
----- Message from Kesha Oneil sent at 1/24/2022  1:11 PM CST -----  Type:  Pharmacy Calling to Clarify an RX    Name of Caller pharmacist   Pharmacy Name:[facility]  Prescription Name:Lorazepam 0.5mg  What do they need to clarify?:refill request  Best Call Back Number:[phone] or fax [phone]  Additional Information:na

## 2022-01-24 NOTE — TELEPHONE ENCOUNTER
----- Message from Rehana Chinchilla sent at 1/24/2022  8:07 AM CST -----  .Type:  RX Refill Request      Refill or New Rx:LORazepam (ATIVAN) 0.5 MG tablet  RX Name and Strength  How is the patient currently taking it? (ex. 1XDay): Daily   Is this a 30 day or 90 day RX: 30    Preferred Pharmacy with phone number:   [facility] - [place], [place] - [address]  [address] [address]  Phone: [phone] Fax: [phone]

## 2022-01-24 NOTE — TELEPHONE ENCOUNTER
LOV 11/3/21 Vicente    Transmission to pharmacy failed and I can't give  Verbal for a control substance w/o your NIKKI#

## 2022-01-24 NOTE — TELEPHONE ENCOUNTER
No new care gaps identified.  Powered by [employer] Reference number: [id number].   1/24/2022 8:29:00 AM CST

## 2022-01-25 ENCOUNTER — PATIENT MESSAGE (OUTPATIENT)
Dept: INTERNAL MEDICINE | Facility: CLINIC | Age: 72
End: 2022-01-25
Payer: MEDICARE

## 2022-01-25 DIAGNOSIS — F43.0 PANIC ATTACK AS REACTION TO STRESS: ICD-10-CM

## 2022-01-25 DIAGNOSIS — F41.0 PANIC ATTACK AS REACTION TO STRESS: ICD-10-CM

## 2022-01-25 RX ORDER — LORAZEPAM 0.5 MG/1
0.5 TABLET ORAL DAILY PRN
Qty: 30 TABLET | Refills: 0 | Status: SHIPPED | OUTPATIENT
Start: 2022-01-25 | End: 2022-01-25 | Stop reason: SDUPTHER

## 2022-01-25 RX ORDER — LORAZEPAM 0.5 MG/1
0.5 TABLET ORAL DAILY PRN
Qty: 30 TABLET | Refills: 0 | Status: SHIPPED | OUTPATIENT
Start: 2022-01-25 | End: 2022-01-27 | Stop reason: SDUPTHER

## 2022-01-27 ENCOUNTER — TELEPHONE (OUTPATIENT)
Dept: INTERNAL MEDICINE | Facility: CLINIC | Age: 72
End: 2022-01-27
Payer: MEDICARE

## 2022-01-27 DIAGNOSIS — F43.0 PANIC ATTACK AS REACTION TO STRESS: ICD-10-CM

## 2022-01-27 DIAGNOSIS — F41.0 PANIC ATTACK AS REACTION TO STRESS: ICD-10-CM

## 2022-01-27 RX ORDER — LORAZEPAM 0.5 MG/1
0.5 TABLET ORAL DAILY PRN
Qty: 15 TABLET | Refills: 0 | Status: SHIPPED | OUTPATIENT
Start: 2022-01-27 | End: 2022-03-01 | Stop reason: SDUPTHER

## 2022-01-27 NOTE — TELEPHONE ENCOUNTER
April was calling to see if a short supply of pt Rx can be sent in to a local pharmacy since pt is currently out of medication and it can take up to 1-2 before April can deliver pt RX. Can you please advise.    Lorazepam

## 2022-01-27 NOTE — TELEPHONE ENCOUNTER
----- Message from Norma Pavon sent at 1/27/2022 10:56 AM CST -----  Contact: Ayesha from [facility]/ [phone]  Requesting an RX refill or new RX.  Is this a refill or new RX: refill  RX name and strength :  LORazepam (ATIVAN) 0.5 MG tablet  Is this a 30 day or 90 day RX: 2 week supply  Patient advised that in the future they can use their [facility] account to request a refill?:  n/a  Patient advised that RX refills can take up to 72 hours?:  n/a  Pharmacy name and phone # :  [id number] [place] [address] AT AT [facility]   Phone:  [phone]  Fax:  [phone]  Comments:

## 2022-01-31 ENCOUNTER — PATIENT MESSAGE (OUTPATIENT)
Dept: INTERNAL MEDICINE | Facility: CLINIC | Age: 72
End: 2022-01-31
Payer: MEDICARE

## 2022-02-04 NOTE — TELEPHONE ENCOUNTER
Quick DC. Inappropriate Request    Refill Authorization Note   Cassandra Campos  is requesting a refill authorization.  Brief Assessment and Rationale for Refill:  Quick Discontinue  Medication Therapy Plan:  True Metrix BG Meter last picked up 10/13/21    Medication Reconciliation Completed:  No      Comments:   Pended Medication(s)       Requested Prescriptions     Pending Prescriptions Disp Refills    TRUE METRIX GLUCOSE METER kit [Pharmacy Med Name: TRUE METRIX BLOOD GLUCOSEMETER w/Device  Kit]       Sig: USE AS DIRECTED        Duplicate Pended Encounter(s)/ Last Prescribed Details: (includes pharmacy & prescriber details)   Ordering Encounter Report    Associated Reports   View Encounter              Note composed:12:21 PM 02/04/2022

## 2022-02-07 RX ORDER — INSULIN PUMP SYRINGE, 3 ML
EACH MISCELLANEOUS
Qty: 1 EACH | Refills: 0 | Status: SHIPPED | OUTPATIENT
Start: 2022-02-07 | End: 2023-02-07

## 2022-02-07 NOTE — TELEPHONE ENCOUNTER
Refill Authorization Note   Cassandra Campos  is requesting a refill authorization.  Brief Assessment and Rationale for Refill:  Approve     Medication Therapy Plan:       Medication Reconciliation Completed: No   Comments:   --->Care Gap information included below if applicable.   Orders Placed This Encounter    blood-glucose meter (TRUE METRIX GLUCOSE METER) kit      Requested Prescriptions   Signed Prescriptions Disp Refills    blood-glucose meter (TRUE METRIX GLUCOSE METER) kit 1 each 0     Sig: To check BG 1 times daily, to use with insurance preferred meter       Endocrinology: Diabetes - Supplies Passed - 2/7/2022  7:45 AM        Passed - Patient is at least 18 years old        Passed - Valid encounter within last 15 months     Recent Visits  Date Type Provider Dept   11/03/21 Office Visit Emil Zhang MD [facility] Internal Medicine   07/22/21 Office Visit Emil Zhang MD [facility] Internal Medicine   04/22/21 Office Visit Emil Zhang MD [facility] Internal Medicine   01/21/21 Office Visit Denia Maldonado MD [facility] Internal Medicine   08/21/20 Office Visit Denia Maldonado MD [facility] Internal Medicine   07/30/20 Office Visit Denia Maldonado MD [facility] Internal Medicine   04/07/20 Office Visit Denia Maldonado MD [facility] Internal Medicine   Showing recent visits within past 720 days and meeting all other requirements  Future Appointments  No visits were found meeting these conditions.  Showing future appointments within next 150 days and meeting all other requirements      Future Appointments              In 3 weeks [facility]    In 3 weeks Emil Zhang MD [facility] Internal Medicine,  [facility]    In 3 weeks Adam Wilkins, [facility] - Audiology,  [facility]    In 3 weeks MD Alexsandra Florez - Ear Nose Throat,  Medical C                Passed - HBA1C within 1080 days     Lab Results   Component Value Date    HGBA1C 7.2 (H) 11/03/2021    HGBA1C 7.4 (H) 07/22/2021     HGBA1C 6.8 (H) 01/14/2021                  Appointments  past 12m or future 3m with PCP    Date Provider   Last Visit   11/3/2021 Emil Zhang MD   Next Visit   1/24/2022 Emil Zhang MD   ED visits in past 90 days: 0     Note composed:7:47 AM 02/07/2022

## 2022-02-08 ENCOUNTER — OFFICE VISIT (OUTPATIENT)
Dept: INTERNAL MEDICINE | Facility: CLINIC | Age: 72
End: 2022-02-08
Payer: MEDICARE

## 2022-02-08 DIAGNOSIS — J20.9 ACUTE BRONCHITIS, UNSPECIFIED ORGANISM: Primary | ICD-10-CM

## 2022-02-08 PROCEDURE — 1159F MED LIST DOCD IN RCRD: CPT | Mod: HCNC,CPTII,95, | Performed by: PHYSICIAN ASSISTANT

## 2022-02-08 PROCEDURE — 1160F RVW MEDS BY RX/DR IN RCRD: CPT | Mod: HCNC,CPTII,95, | Performed by: PHYSICIAN ASSISTANT

## 2022-02-08 PROCEDURE — 1160F PR REVIEW ALL MEDS BY PRESCRIBER/CLIN PHARMACIST DOCUMENTED: ICD-10-PCS | Mod: HCNC,CPTII,95, | Performed by: PHYSICIAN ASSISTANT

## 2022-02-08 PROCEDURE — 3072F PR LOW RISK FOR RETINOPATHY: ICD-10-PCS | Mod: HCNC,CPTII,95, | Performed by: PHYSICIAN ASSISTANT

## 2022-02-08 PROCEDURE — 99213 PR OFFICE/OUTPT VISIT, EST, LEVL III, 20-29 MIN: ICD-10-PCS | Mod: HCNC,95,, | Performed by: PHYSICIAN ASSISTANT

## 2022-02-08 PROCEDURE — 3072F LOW RISK FOR RETINOPATHY: CPT | Mod: HCNC,CPTII,95, | Performed by: PHYSICIAN ASSISTANT

## 2022-02-08 PROCEDURE — 99213 OFFICE O/P EST LOW 20 MIN: CPT | Mod: HCNC,95,, | Performed by: PHYSICIAN ASSISTANT

## 2022-02-08 PROCEDURE — 1159F PR MEDICATION LIST DOCUMENTED IN MEDICAL RECORD: ICD-10-PCS | Mod: HCNC,CPTII,95, | Performed by: PHYSICIAN ASSISTANT

## 2022-02-08 RX ORDER — ALBUTEROL SULFATE 90 UG/1
2 AEROSOL, METERED RESPIRATORY (INHALATION) EVERY 4 HOURS PRN
Qty: 18 G | Refills: 0 | Status: SHIPPED | OUTPATIENT
Start: 2022-02-08 | End: 2022-04-02

## 2022-02-08 RX ORDER — LEVOCETIRIZINE DIHYDROCHLORIDE 5 MG/1
5 TABLET, FILM COATED ORAL NIGHTLY
Qty: 30 TABLET | Refills: 0 | Status: SHIPPED | OUTPATIENT
Start: 2022-02-08 | End: 2022-04-02

## 2022-02-08 RX ORDER — PROMETHAZINE HYDROCHLORIDE AND DEXTROMETHORPHAN HYDROBROMIDE 6.25; 15 MG/5ML; MG/5ML
5 SYRUP ORAL EVERY 6 HOURS PRN
Qty: 118 ML | Refills: 0 | Status: SHIPPED | OUTPATIENT
Start: 2022-02-08 | End: 2022-03-01

## 2022-02-08 RX ORDER — PREDNISONE 10 MG/1
10 TABLET ORAL 2 TIMES DAILY
Qty: 10 TABLET | Refills: 0 | Status: SHIPPED | OUTPATIENT
Start: 2022-02-08 | End: 2022-02-13

## 2022-02-08 NOTE — PROGRESS NOTES
Subjective:       Patient ID: Cassandra Campos is a 71 y.o. female.    Chief Complaint: No chief complaint on file.      The patient location is: work, [place]  The chief complaint leading to consultation is: cough    Visit type: audiovisual    Face to Face time with patient: 5 minutes (chart review started 837; video started 837; video ended 841)  6 minutes of total time spent on the encounter, which includes face to face time and non-face to face time preparing to see the patient (eg, review of tests), Obtaining and/or reviewing separately obtained history, Documenting clinical information in the electronic or other health record, Independently interpreting results (not separately reported) and communicating results to the patient/family/caregiver, or Care coordination (not separately reported).     Each patient to whom he or she provides medical services by telemedicine is:  (1) informed of the relationship between the physician and patient and the respective role of any other health care provider with respect to management of the patient; and (2) notified that he or she may decline to receive medical services by telemedicine and may withdraw from such care at any time.    Patient Care Team:  Emil Zhang MD as PCP - General (Family Medicine)  Duane Davila MD (Cardiovascular Disease)  Sultana Sofia MD as Consulting Physician (Dermatology)  Eduardo Lawrence MD (Ophthalmology)  Twan Pelaez MD as Consulting Physician (Cardiology)  Deanna Soto DPM as Consulting Physician (Podiatry)  Rashaad Watson MD as Consulting Physician (General Surgery)     Cough  This is a new problem. The current episode started yesterday. The problem has been rapidly worsening. The problem occurs every few minutes. The cough is non-productive. Associated symptoms include headaches, postnasal drip, rhinorrhea, a sore throat and wheezing. Pertinent negatives include no chest pain, chills, ear congestion, ear pain, fever, heartburn,  hemoptysis, myalgias, nasal congestion, rash, sweats or weight loss. The treatment provided no relief. Her past medical history is significant for bronchitis and pneumonia. There is no history of asthma, bronchiectasis, COPD, emphysema or environmental allergies.   She has taken 2 covid tests, last one yesterday, both negative.    Review of Systems   Constitutional: Negative for chills, fever and weight loss.   HENT: Positive for postnasal drip, rhinorrhea and sore throat. Negative for ear pain.    Respiratory: Positive for cough and wheezing. Negative for hemoptysis.    Cardiovascular: Negative for chest pain.   Gastrointestinal: Negative for diarrhea, heartburn, nausea and vomiting.   Musculoskeletal: Negative for myalgias.   Skin: Negative for rash.   Allergic/Immunologic: Negative for environmental allergies.   Neurological: Positive for headaches.         Objective:        Wt Readings from Last 3 Encounters:   11/03/21 92.5 kg (204 lb)   11/03/21 92.5 kg (204 lb)   11/03/21 92.9 kg (204 lb 12.9 oz)     Temp Readings from Last 3 Encounters:   11/03/21 98 °F (36.7 °C) (Tympanic)   07/22/21 97.9 °F (36.6 °C) (Tympanic)   04/22/21 97.9 °F (36.6 °C) (Tympanic)     BP Readings from Last 3 Encounters:   11/03/21 114/80   11/03/21 114/80   07/22/21 136/80     Pulse Readings from Last 3 Encounters:   11/03/21 106   11/03/21 106   07/22/21 (!) 118     There is no height or weight on file to calculate BMI.    Physical Exam  Vitals and nursing note reviewed.   Constitutional:       General: She is not in acute distress.     Appearance: She is well-developed.   HENT:      Head: Normocephalic and atraumatic.   Eyes:      General: Lids are normal. No scleral icterus.     Extraocular Movements: Extraocular movements intact.      Conjunctiva/sclera: Conjunctivae normal.   Pulmonary:      Effort: Pulmonary effort is normal.   Neurological:      Mental Status: She is alert.   Psychiatric:         Mood and Affect: Mood and affect  normal.         Assessment:       1. Acute bronchitis, unspecified organism        Plan:     Problem List Items Addressed This Visit    None     Visit Diagnoses     Acute bronchitis, unspecified organism    -  Primary    Relevant Medications    levocetirizine (XYZAL) 5 MG tablet    promethazine-dextromethorphan (PROMETHAZINE-DM) 6.25-15 mg/5 mL Syrp    albuterol (VENTOLIN HFA) 90 mcg/actuation inhaler    predniSONE (DELTASONE) 10 MG tablet

## 2022-02-08 NOTE — PATIENT INSTRUCTIONS
1. Drink plenty of fluids  2. Get plenty of rest.  3. Take Tylenol as directed on package for pain/fever. Do not take more than package suggests to take.   4. Go to Emergency Department if your symptoms worsen.  5. Follow up with your PCP in 1 week if symptoms persist.   Patient Education       Acute Bronchitis Discharge Instructions, Adult   About this topic   Acute bronchitis is an infection of the bronchi which are tubes that carry air into your lungs. Most of the time, this infection is caused by a virus so an antibiotic wont help. Your cough should get better within 2 to 3 weeks, but may take a bit longer.     What care is needed at home?   · Ask your doctor what you need to do when you go home. Make sure you ask questions if you do not understand what the doctor says.  · Do not smoke or be in smoke-filled places. Avoid other things that may cause breathing problems like fumes, pollution, dust, and other common allergens.  · Drink lots of water, juice, or broth to replace fluids lost in runny nose and fever.  · If you have medicines to take when you are feeling short of breath, be sure to carry them with you. Then, you can take them when needed.  · If you smoke, stop.  · Take warm, steamy showers to help soothe the cough.  · Use a cool mist humidifier. This may make it easier to breathe.  · Use hard candy or cough drops to soothe sore throat and cough.  · Wash your hands often. This will help prevent you from spreading germs to others.  What follow-up care is needed?   · Your doctor may ask you to make visits to the office to check on your progress. Be sure to keep these visits.  · It may take 1 to 3 weeks to feel better.  · Ask your doctor if you need a vaccine to prevent problems from bronchitis.  What drugs may be needed?   Take your drugs as ordered by your doctor. The doctor may order drugs to:  · Make breathing easier  · Control coughing  · Lower swelling in your airways  · Treat infection  · Control  extra mucus  Will physical activity be limited?   Your physical activities may be limited as long as you have the signs of this health problem. Avoid heavy and tiring activities. Talk to your doctor about the right amount of activity for you.  What problems could happen?   · Long-term swelling of the lungs. This is chronic bronchitis.  · Asthma  · Lung infection  · Cough continues even after you feel better  What can be done to prevent this health problem?   · Wash your hands often with soap and water for at least 20 seconds, especially after coughing or sneezing. Alcohol-based hand sanitizers also work to kill germs.       · If you are sick, cover your mouth and nose with tissue when you cough or sneeze. You can also cough into your elbow. Throw away tissues in the trash and wash your hands after touching used tissues.  · Do not get too close (kissing, hugging) to people who are sick.  · Do not share towels or hankies with anyone who is sick.  · Clean commonly handled things like door handles, remotes, toys, and phones. Wipe them with a disinfectant.  · Stay away from crowded places.  · Stop smoking. Stay away from dust and fumes.  · Get a flu shot each year.     When do I need to call the doctor?   · Signs of infection. These include a fever of 100.4°F (38°C) or higher, chills, cough, more sputum or change in color of sputum.  · You are having so much trouble breathing that you can only say one or two words at a time.  · You need to sit upright at all times to be able to breathe and or cannot lie down.  · You have trouble breathing when talking or sitting still.  · You have a fever of 100.4°F (38°C) or higher or chills.  · You have chest pain when you cough, have trouble breathing but can still talk in full sentences, or cough up blood.  · You develop a barking cough.  · You are still coughing in 3 weeks.  Teach Back: Helping You Understand   The Teach Back Method helps you understand the information we are giving  you. After you talk with the staff, tell them in your own words what you learned. This helps to make sure the staff has described each thing clearly. It also helps to explain things that may have been confusing. Before going home, make sure you can do these:  · I can tell you about my condition.  · I can tell you what may help ease my breathing.  · I can tell you what I will do if I have more trouble breathing, it is harder to breathe, or I feel like I am getting less air.  Where can I learn more?   American Academy of [employer]icians  https://familydoctor.org/condition/acute-bronchitis/   [facility] Choices  https://www.nhs.uk/conditions/bronchitis/   Last Reviewed Date   2021-06-09  Consumer Information Use and Disclaimer   This information is not specific medical advice and does not replace information you receive from your health care provider. This is only a brief summary of general information. It does NOT include all information about conditions, illnesses, injuries, tests, procedures, treatments, therapies, discharge instructions or life-style choices that may apply to you. You must talk with your health care provider for complete information about your health and treatment options. This information should not be used to decide whether or not to accept your health care providers advice, instructions or recommendations. Only your health care provider has the knowledge and training to provide advice that is right for you.  Copyright   Copyright © 2021 [employer] and its affiliates and/or licensors. All rights reserved.

## 2022-02-14 ENCOUNTER — PATIENT MESSAGE (OUTPATIENT)
Dept: INTERNAL MEDICINE | Facility: CLINIC | Age: 72
End: 2022-02-14
Payer: MEDICARE

## 2022-02-15 RX ORDER — AMOXICILLIN 500 MG/1
500 CAPSULE ORAL 3 TIMES DAILY
Qty: 21 CAPSULE | Refills: 0 | Status: SHIPPED | OUTPATIENT
Start: 2022-02-15 | End: 2022-03-01

## 2022-02-15 RX ORDER — BENZONATATE 200 MG/1
200 CAPSULE ORAL 3 TIMES DAILY PRN
Qty: 30 CAPSULE | Refills: 0 | Status: SHIPPED | OUTPATIENT
Start: 2022-02-15 | End: 2022-02-25

## 2022-03-01 ENCOUNTER — HOSPITAL ENCOUNTER (OUTPATIENT)
Dept: RADIOLOGY | Facility: HOSPITAL | Age: 72
Discharge: HOME | End: 2022-03-01
Attending: FAMILY MEDICINE
Payer: MEDICARE

## 2022-03-01 ENCOUNTER — OFFICE VISIT (OUTPATIENT)
Dept: INTERNAL MEDICINE | Facility: CLINIC | Age: 72
End: 2022-03-01
Payer: MEDICARE

## 2022-03-01 VITALS
HEART RATE: 125 BPM | WEIGHT: 203.69 LBS | HEIGHT: 63 IN | RESPIRATION RATE: 18 BRPM | BODY MASS INDEX: 36.09 KG/M2 | OXYGEN SATURATION: 95 % | DIASTOLIC BLOOD PRESSURE: 80 MMHG | SYSTOLIC BLOOD PRESSURE: 132 MMHG | TEMPERATURE: 98 F

## 2022-03-01 DIAGNOSIS — J45.20 MILD INTERMITTENT ASTHMA WITHOUT COMPLICATION: ICD-10-CM

## 2022-03-01 DIAGNOSIS — E11.9 CONTROLLED TYPE 2 DIABETES MELLITUS WITHOUT COMPLICATION, WITHOUT LONG-TERM CURRENT USE OF INSULIN: ICD-10-CM

## 2022-03-01 DIAGNOSIS — F41.0 PANIC ATTACK AS REACTION TO STRESS: ICD-10-CM

## 2022-03-01 DIAGNOSIS — R05.9 COUGH: ICD-10-CM

## 2022-03-01 DIAGNOSIS — Z12.39 BREAST SCREENING: ICD-10-CM

## 2022-03-01 DIAGNOSIS — I10 PRIMARY HYPERTENSION: Primary | ICD-10-CM

## 2022-03-01 DIAGNOSIS — Z12.31 ENCOUNTER FOR SCREENING MAMMOGRAM FOR MALIGNANT NEOPLASM OF BREAST: ICD-10-CM

## 2022-03-01 DIAGNOSIS — I47.10 PAROXYSMAL SVT (SUPRAVENTRICULAR TACHYCARDIA): ICD-10-CM

## 2022-03-01 DIAGNOSIS — F43.0 PANIC ATTACK AS REACTION TO STRESS: ICD-10-CM

## 2022-03-01 PROCEDURE — 3051F HG A1C>EQUAL 7.0%<8.0%: CPT | Mod: CPTII,S$GLB,, | Performed by: FAMILY MEDICINE

## 2022-03-01 PROCEDURE — 3075F SYST BP GE 130 - 139MM HG: CPT | Mod: CPTII,S$GLB,, | Performed by: FAMILY MEDICINE

## 2022-03-01 PROCEDURE — 77063 BREAST TOMOSYNTHESIS BI: CPT | Mod: 26,,, | Performed by: RADIOLOGY

## 2022-03-01 PROCEDURE — 1159F MED LIST DOCD IN RCRD: CPT | Mod: CPTII,S$GLB,, | Performed by: FAMILY MEDICINE

## 2022-03-01 PROCEDURE — 3051F PR MOST RECENT HEMOGLOBIN A1C LEVEL 7.0 - < 8.0%: ICD-10-PCS | Mod: CPTII,S$GLB,, | Performed by: FAMILY MEDICINE

## 2022-03-01 PROCEDURE — 77067 SCR MAMMO BI INCL CAD: CPT | Mod: 26,,, | Performed by: RADIOLOGY

## 2022-03-01 PROCEDURE — 1101F PR PT FALLS ASSESS DOC 0-1 FALLS W/OUT INJ PAST YR: ICD-10-PCS | Mod: CPTII,S$GLB,, | Performed by: FAMILY MEDICINE

## 2022-03-01 PROCEDURE — 99214 PR OFFICE/OUTPT VISIT, EST, LEVL IV, 30-39 MIN: ICD-10-PCS | Mod: S$GLB,,, | Performed by: FAMILY MEDICINE

## 2022-03-01 PROCEDURE — 1126F PR PAIN SEVERITY QUANTIFIED, NO PAIN PRESENT: ICD-10-PCS | Mod: CPTII,S$GLB,, | Performed by: FAMILY MEDICINE

## 2022-03-01 PROCEDURE — 77063 BREAST TOMOSYNTHESIS BI: CPT | Mod: TC

## 2022-03-01 PROCEDURE — 4010F ACE/ARB THERAPY RXD/TAKEN: CPT | Mod: CPTII,S$GLB,, | Performed by: FAMILY MEDICINE

## 2022-03-01 PROCEDURE — 3072F PR LOW RISK FOR RETINOPATHY: ICD-10-PCS | Mod: CPTII,S$GLB,, | Performed by: FAMILY MEDICINE

## 2022-03-01 PROCEDURE — 77063 MAMMO DIGITAL SCREENING BILAT WITH TOMO: ICD-10-PCS | Mod: 26,,, | Performed by: RADIOLOGY

## 2022-03-01 PROCEDURE — 3075F PR MOST RECENT SYSTOLIC BLOOD PRESS GE 130-139MM HG: ICD-10-PCS | Mod: CPTII,S$GLB,, | Performed by: FAMILY MEDICINE

## 2022-03-01 PROCEDURE — 3288F FALL RISK ASSESSMENT DOCD: CPT | Mod: CPTII,S$GLB,, | Performed by: FAMILY MEDICINE

## 2022-03-01 PROCEDURE — 99999 PR PBB SHADOW E&M-EST. PATIENT-LVL IV: CPT | Mod: PBBFAC,,, | Performed by: FAMILY MEDICINE

## 2022-03-01 PROCEDURE — 3079F DIAST BP 80-89 MM HG: CPT | Mod: CPTII,S$GLB,, | Performed by: FAMILY MEDICINE

## 2022-03-01 PROCEDURE — 3072F LOW RISK FOR RETINOPATHY: CPT | Mod: CPTII,S$GLB,, | Performed by: FAMILY MEDICINE

## 2022-03-01 PROCEDURE — 99214 OFFICE O/P EST MOD 30 MIN: CPT | Mod: S$GLB,,, | Performed by: FAMILY MEDICINE

## 2022-03-01 PROCEDURE — 99999 PR PBB SHADOW E&M-EST. PATIENT-LVL IV: ICD-10-PCS | Mod: PBBFAC,,, | Performed by: FAMILY MEDICINE

## 2022-03-01 PROCEDURE — 3008F PR BODY MASS INDEX (BMI) DOCUMENTED: ICD-10-PCS | Mod: CPTII,S$GLB,, | Performed by: FAMILY MEDICINE

## 2022-03-01 PROCEDURE — 1101F PT FALLS ASSESS-DOCD LE1/YR: CPT | Mod: CPTII,S$GLB,, | Performed by: FAMILY MEDICINE

## 2022-03-01 PROCEDURE — 77067 MAMMO DIGITAL SCREENING BILAT WITH TOMO: ICD-10-PCS | Mod: 26,,, | Performed by: RADIOLOGY

## 2022-03-01 PROCEDURE — 1126F AMNT PAIN NOTED NONE PRSNT: CPT | Mod: CPTII,S$GLB,, | Performed by: FAMILY MEDICINE

## 2022-03-01 PROCEDURE — 3288F PR FALLS RISK ASSESSMENT DOCUMENTED: ICD-10-PCS | Mod: CPTII,S$GLB,, | Performed by: FAMILY MEDICINE

## 2022-03-01 PROCEDURE — 4010F PR ACE/ARB THEARPY RXD/TAKEN: ICD-10-PCS | Mod: CPTII,S$GLB,, | Performed by: FAMILY MEDICINE

## 2022-03-01 PROCEDURE — 1159F PR MEDICATION LIST DOCUMENTED IN MEDICAL RECORD: ICD-10-PCS | Mod: CPTII,S$GLB,, | Performed by: FAMILY MEDICINE

## 2022-03-01 PROCEDURE — 3008F BODY MASS INDEX DOCD: CPT | Mod: CPTII,S$GLB,, | Performed by: FAMILY MEDICINE

## 2022-03-01 PROCEDURE — 3079F PR MOST RECENT DIASTOLIC BLOOD PRESSURE 80-89 MM HG: ICD-10-PCS | Mod: CPTII,S$GLB,, | Performed by: FAMILY MEDICINE

## 2022-03-01 RX ORDER — LORAZEPAM 0.5 MG/1
0.5 TABLET ORAL DAILY PRN
Qty: 30 TABLET | Refills: 5 | Status: SHIPPED | OUTPATIENT
Start: 2022-03-01 | End: 2022-06-09 | Stop reason: SDUPTHER

## 2022-03-01 NOTE — PROGRESS NOTES
Patient, Cassandra Campos (MRN #[id number]), presented with a recorded BMI of 36.08 kg/m^2 and a documented comorbidity(s):  - Diabetes Mellitus Type 2  - Hypertension  to which the severe obesity is a contributing factor. This is consistent with the definition of severe obesity (BMI 35.0-39.9) with comorbidity (ICD-10 E66.01, Z68.35). The patient's severe obesity was monitored, evaluated, addressed and/or treated. This addendum to the medical record is made on 03/01/2022.

## 2022-03-01 NOTE — PROGRESS NOTES
Subjective:       Patient ID: Cassandra Campos is a 71 y.o. female.    Chief Complaint: Follow-up    Follow-up hypertension cough diabetes elevated serum protein leg pain.  She denies headache chest pain palpitations shortness of breath edema.  She reports cough is improved since lisinopril discontinued Cozaar started.  She has had that she strained her left thigh several weeks ago.    Review of Systems   Constitutional: Negative for activity change, appetite change, fatigue and unexpected weight change.   HENT: Negative for congestion.    Respiratory: Positive for cough. Negative for chest tightness, shortness of breath and wheezing.    Cardiovascular: Negative for chest pain, palpitations and leg swelling.   Gastrointestinal: Negative for abdominal distention and abdominal pain.   Endocrine: Negative for polydipsia and polyuria.   Genitourinary: Negative for difficulty urinating.   Musculoskeletal: Positive for myalgias.   Neurological: Negative for headaches.       Objective:      Physical Exam  Constitutional:       General: She is not in acute distress.     Appearance: She is not ill-appearing or diaphoretic.   Cardiovascular:      Rate and Rhythm: Normal rate and regular rhythm.      Heart sounds: No murmur heard.    No gallop.   Pulmonary:      Effort: Pulmonary effort is normal. No respiratory distress.      Breath sounds: No wheezing, rhonchi or rales.   Abdominal:      General: There is no distension.   Lymphadenopathy:      Cervical: No cervical adenopathy.   Neurological:      Mental Status: She is alert and oriented to person, place, and time.         Lab Visit on 11/03/2021   Component Date Value Ref Range Status    Sodium 11/03/2021 138  136 - 145 mmol/L Final    Potassium 11/03/2021 3.9  3.5 - 5.1 mmol/L Final    Chloride 11/03/2021 103  95 - 110 mmol/L Final    CO2 11/03/2021 25  23 - 29 mmol/L Final    Glucose 11/03/2021 115 (A) 70 - 110 mg/dL Final    BUN 11/03/2021 11  8 - 23 mg/dL Final     Creatinine 11/03/2021 0.7  0.5 - 1.4 mg/dL Final    Calcium 11/03/2021 9.7  8.7 - 10.5 mg/dL Final    Total Protein 11/03/2021 9.0 (A) 6.0 - 8.4 g/dL Final    Albumin 11/03/2021 3.9  3.5 - 5.2 g/dL Final    Total Bilirubin 11/03/2021 0.4  0.1 - 1.0 mg/dL Final    Alkaline Phosphatase 11/03/2021 116  55 - 135 U/L Final    AST 11/03/2021 80 (A) 10 - 40 U/L Final    ALT 11/03/2021 83 (A) 10 - 44 U/L Final    Anion Gap 11/03/2021 10  8 - 16 mmol/L Final    eGFR if African American 11/03/2021 >60.0  >60 mL/min/1.73 m^2 Final    eGFR if non African American 11/03/2021 >60.0  >60 mL/min/1.73 m^2 Final    Hemoglobin A1C 11/03/2021 7.2 (A) 4.0 - 5.6 % Final    Estimated Avg Glucose 11/03/2021 160 (A) 68 - 131 mg/dL Final    Cholesterol 11/03/2021 164  120 - 199 mg/dL Final    Triglycerides 11/03/2021 149  30 - 150 mg/dL Final    HDL 11/03/2021 38 (A) 40 - 75 mg/dL Final    LDL Cholesterol 11/03/2021 96.2  63.0 - 159.0 mg/dL Final    HDL/Cholesterol Ratio 11/03/2021 23.2  20.0 - 50.0 % Final    Total Cholesterol/HDL Ratio 11/03/2021 4.3  2.0 - 5.0 Final    Non-HDL Cholesterol 11/03/2021 126  mg/dL Final     Assessment:       1. Primary hypertension    2. Panic attack as reaction to stress    3. Controlled type 2 diabetes mellitus without complication, without long-term current use of insulin    4. Paroxysmal SVT (supraventricular tachycardia)    5. BMI 36.0-36.9,adult    6. Cough    7. Mild intermittent asthma without complication        Plan:     Blood pressure controlled refill lorazepam lab was ordered follow-up in 3 months.  Elevated pulse noted.  History of SVT.  She is also using albuterol for mild intermittent asthma    Primary hypertension    Panic attack as reaction to stress  -     LORazepam (ATIVAN) 0.5 MG tablet; Take 1 tablet (0.5 mg total) by mouth daily as needed.  Dispense: 30 tablet; Refill: 5    Controlled type 2 diabetes mellitus without complication, without long-term current use of  insulin  -     Comprehensive Metabolic Panel; Future; Expected date: 03/01/2022  -     Hemoglobin A1C; Future; Expected date: 03/01/2022    Paroxysmal SVT (supraventricular tachycardia)    BMI 36.0-36.9,adult    Cough    Mild intermittent asthma without complication

## 2022-03-02 ENCOUNTER — PATIENT MESSAGE (OUTPATIENT)
Dept: INTERNAL MEDICINE | Facility: CLINIC | Age: 72
End: 2022-03-02
Payer: MEDICARE

## 2022-03-09 ENCOUNTER — PATIENT MESSAGE (OUTPATIENT)
Dept: INTERNAL MEDICINE | Facility: CLINIC | Age: 72
End: 2022-03-09
Payer: MEDICARE

## 2022-03-16 ENCOUNTER — IMMUNIZATION (OUTPATIENT)
Dept: PRIMARY CARE CLINIC | Facility: CLINIC | Age: 72
End: 2022-03-16
Payer: MEDICARE

## 2022-03-16 DIAGNOSIS — E11.9 DM II (DIABETES MELLITUS, TYPE II), CONTROLLED: ICD-10-CM

## 2022-03-16 DIAGNOSIS — Z23 NEED FOR VACCINATION: Primary | ICD-10-CM

## 2022-03-16 PROCEDURE — 0003A COVID-19, MRNA, LNP-S, PF, 30 MCG/0.3 ML DOSE VACCINE: CPT | Mod: CV19,PBBFAC | Performed by: FAMILY MEDICINE

## 2022-03-16 NOTE — TELEPHONE ENCOUNTER
No new care gaps identified.  Powered by [employer]. Reference number: [id number].   3/16/2022 3:11:06 AM CDT

## 2022-03-21 ENCOUNTER — PATIENT MESSAGE (OUTPATIENT)
Dept: INTERNAL MEDICINE | Facility: CLINIC | Age: 72
End: 2022-03-21
Payer: MEDICARE

## 2022-03-21 DIAGNOSIS — E11.9 DM II (DIABETES MELLITUS, TYPE II), CONTROLLED: ICD-10-CM

## 2022-03-21 NOTE — TELEPHONE ENCOUNTER
No new care gaps identified.  Powered by [employer]. Reference number: [id number].   3/21/2022 10:55:13 AM CDT

## 2022-03-22 RX ORDER — BLOOD SUGAR DIAGNOSTIC
STRIP MISCELLANEOUS
Qty: 100 STRIP | Refills: 3 | OUTPATIENT
Start: 2022-03-22

## 2022-04-05 DIAGNOSIS — I15.9 SECONDARY HYPERTENSION: ICD-10-CM

## 2022-04-05 DIAGNOSIS — E11.9 CONTROLLED TYPE 2 DIABETES MELLITUS WITHOUT COMPLICATION, WITHOUT LONG-TERM CURRENT USE OF INSULIN: ICD-10-CM

## 2022-04-05 NOTE — TELEPHONE ENCOUNTER
No new care gaps identified.  Powered by [employer] by [employer] Reference number: [id number].   4/05/2022 12:30:01 AM CDT

## 2022-04-06 RX ORDER — METFORMIN HYDROCHLORIDE 500 MG/1
TABLET, EXTENDED RELEASE ORAL
Qty: 180 TABLET | Refills: 1 | Status: SHIPPED | OUTPATIENT
Start: 2022-04-06 | End: 2022-08-30

## 2022-04-06 RX ORDER — OMEPRAZOLE 20 MG/1
CAPSULE, DELAYED RELEASE ORAL
Qty: 90 CAPSULE | Refills: 3 | Status: SHIPPED | OUTPATIENT
Start: 2022-04-06 | End: 2023-02-15 | Stop reason: SDUPTHER

## 2022-04-06 RX ORDER — METOPROLOL SUCCINATE 50 MG/1
TABLET, EXTENDED RELEASE ORAL
Qty: 90 TABLET | Refills: 3 | Status: SHIPPED | OUTPATIENT
Start: 2022-04-06 | End: 2023-02-15 | Stop reason: SDUPTHER

## 2022-04-06 NOTE — TELEPHONE ENCOUNTER
Refill Routing Note   Medication(s) are not appropriate for processing by [facility] for the following reason(s):      - Required laboratory values are abnormal    [facility] action(s):  Defer  Approve       Medication Therapy Plan: Defer toprol xl - abnormal lab  Medication reconciliation completed: No     Appointments  past 12m or future 3m with PCP    Date Provider   Last Visit   3/1/2022 Emil Zhang MD   Next Visit   6/1/2022 Emil Zhang MD   ED visits in past 90 days: 0        Note composed:1:19 PM 04/06/2022

## 2022-04-11 ENCOUNTER — PATIENT MESSAGE (OUTPATIENT)
Dept: INTERNAL MEDICINE | Facility: CLINIC | Age: 72
End: 2022-04-11
Payer: MEDICARE

## 2022-04-11 DIAGNOSIS — G47.00 INSOMNIA, UNSPECIFIED TYPE: ICD-10-CM

## 2022-04-11 RX ORDER — ESZOPICLONE 3 MG/1
TABLET, FILM COATED ORAL
Qty: 90 TABLET | Refills: 1 | Status: SHIPPED | OUTPATIENT
Start: 2022-04-11 | End: 2022-08-31

## 2022-04-11 NOTE — TELEPHONE ENCOUNTER
No new care gaps identified.  Powered by [employer]. Reference number: [id number].   4/11/2022 11:08:24 AM CDT

## 2022-04-13 ENCOUNTER — OFFICE VISIT (OUTPATIENT)
Dept: OTOLARYNGOLOGY | Facility: CLINIC | Age: 72
End: 2022-04-13
Payer: MEDICARE

## 2022-04-13 ENCOUNTER — LAB VISIT (OUTPATIENT)
Dept: LAB | Facility: HOSPITAL | Age: 72
End: 2022-04-13
Attending: PHYSICIAN ASSISTANT
Payer: MEDICARE

## 2022-04-13 ENCOUNTER — OFFICE VISIT (OUTPATIENT)
Dept: INTERNAL MEDICINE | Facility: CLINIC | Age: 72
End: 2022-04-13
Payer: MEDICARE

## 2022-04-13 ENCOUNTER — CLINICAL SUPPORT (OUTPATIENT)
Dept: AUDIOLOGY | Facility: CLINIC | Age: 72
End: 2022-04-13
Payer: MEDICARE

## 2022-04-13 VITALS
DIASTOLIC BLOOD PRESSURE: 84 MMHG | HEART RATE: 104 BPM | HEIGHT: 63 IN | TEMPERATURE: 98 F | SYSTOLIC BLOOD PRESSURE: 134 MMHG | BODY MASS INDEX: 35.86 KG/M2 | RESPIRATION RATE: 18 BRPM | WEIGHT: 202.38 LBS | OXYGEN SATURATION: 96 %

## 2022-04-13 VITALS — SYSTOLIC BLOOD PRESSURE: 116 MMHG | TEMPERATURE: 98 F | DIASTOLIC BLOOD PRESSURE: 75 MMHG

## 2022-04-13 DIAGNOSIS — I10 PRIMARY HYPERTENSION: ICD-10-CM

## 2022-04-13 DIAGNOSIS — F14.11 HISTORY OF COCAINE ABUSE: ICD-10-CM

## 2022-04-13 DIAGNOSIS — E78.5 HYPERLIPIDEMIA ASSOCIATED WITH TYPE 2 DIABETES MELLITUS: Chronic | ICD-10-CM

## 2022-04-13 DIAGNOSIS — R74.8 ELEVATED LIVER ENZYMES: ICD-10-CM

## 2022-04-13 DIAGNOSIS — H90.3 SENSORINEURAL HEARING LOSS (SNHL) OF BOTH EARS: ICD-10-CM

## 2022-04-13 DIAGNOSIS — J34.89 NASAL SEPTAL PERFORATION: Primary | ICD-10-CM

## 2022-04-13 DIAGNOSIS — H61.23 IMPACTED CERUMEN, BILATERAL: Primary | ICD-10-CM

## 2022-04-13 DIAGNOSIS — E11.69 HYPERLIPIDEMIA ASSOCIATED WITH TYPE 2 DIABETES MELLITUS: Chronic | ICD-10-CM

## 2022-04-13 DIAGNOSIS — R74.8 ELEVATED LIVER ENZYMES: Primary | ICD-10-CM

## 2022-04-13 DIAGNOSIS — H61.23 BILATERAL IMPACTED CERUMEN: ICD-10-CM

## 2022-04-13 DIAGNOSIS — H90.3 SENSORINEURAL HEARING LOSS, BILATERAL: ICD-10-CM

## 2022-04-13 LAB
ALBUMIN SERPL BCP-MCNC: 3.7 G/DL (ref 3.5–5.2)
ALP SERPL-CCNC: 111 U/L (ref 55–135)
ALT SERPL W/O P-5'-P-CCNC: 58 U/L (ref 10–44)
AST SERPL-CCNC: 52 U/L (ref 10–40)
BILIRUB DIRECT SERPL-MCNC: 0.1 MG/DL (ref 0.1–0.3)
BILIRUB SERPL-MCNC: 0.3 MG/DL (ref 0.1–1)
CHOLEST SERPL-MCNC: 208 MG/DL (ref 120–199)
CHOLEST/HDLC SERPL: 5.6 {RATIO} (ref 2–5)
HDLC SERPL-MCNC: 37 MG/DL (ref 40–75)
HDLC SERPL: 17.8 % (ref 20–50)
LDLC SERPL CALC-MCNC: 129 MG/DL (ref 63–159)
NONHDLC SERPL-MCNC: 171 MG/DL
PROT SERPL-MCNC: 8.8 G/DL (ref 6–8.4)
TRIGL SERPL-MCNC: 210 MG/DL (ref 30–150)

## 2022-04-13 PROCEDURE — 69210 PR REMOVAL IMPACTED CERUMEN REQUIRING INSTRUMENTATION, UNILATERAL: ICD-10-PCS | Mod: S$GLB,,, | Performed by: OTOLARYNGOLOGY

## 2022-04-13 PROCEDURE — 3072F PR LOW RISK FOR RETINOPATHY: ICD-10-PCS | Mod: CPTII,S$GLB,, | Performed by: PHYSICIAN ASSISTANT

## 2022-04-13 PROCEDURE — 3051F HG A1C>EQUAL 7.0%<8.0%: CPT | Mod: CPTII,S$GLB,, | Performed by: PHYSICIAN ASSISTANT

## 2022-04-13 PROCEDURE — 3075F PR MOST RECENT SYSTOLIC BLOOD PRESS GE 130-139MM HG: ICD-10-PCS | Mod: CPTII,S$GLB,, | Performed by: PHYSICIAN ASSISTANT

## 2022-04-13 PROCEDURE — 3008F PR BODY MASS INDEX (BMI) DOCUMENTED: ICD-10-PCS | Mod: CPTII,S$GLB,, | Performed by: PHYSICIAN ASSISTANT

## 2022-04-13 PROCEDURE — 1126F PR PAIN SEVERITY QUANTIFIED, NO PAIN PRESENT: ICD-10-PCS | Mod: CPTII,S$GLB,, | Performed by: OTOLARYNGOLOGY

## 2022-04-13 PROCEDURE — 99999 PR PBB SHADOW E&M-EST. PATIENT-LVL I: CPT | Mod: PBBFAC,,, | Performed by: AUDIOLOGIST-HEARING AID FITTER

## 2022-04-13 PROCEDURE — 80074 ACUTE HEPATITIS PANEL: CPT | Mod: GA | Performed by: PHYSICIAN ASSISTANT

## 2022-04-13 PROCEDURE — 1160F PR REVIEW ALL MEDS BY PRESCRIBER/CLIN PHARMACIST DOCUMENTED: ICD-10-PCS | Mod: CPTII,S$GLB,, | Performed by: PHYSICIAN ASSISTANT

## 2022-04-13 PROCEDURE — 3288F PR FALLS RISK ASSESSMENT DOCUMENTED: ICD-10-PCS | Mod: CPTII,S$GLB,, | Performed by: PHYSICIAN ASSISTANT

## 2022-04-13 PROCEDURE — 99203 OFFICE O/P NEW LOW 30 MIN: CPT | Mod: 25,S$GLB,, | Performed by: OTOLARYNGOLOGY

## 2022-04-13 PROCEDURE — 99203 PR OFFICE/OUTPT VISIT, NEW, LEVL III, 30-44 MIN: ICD-10-PCS | Mod: 25,S$GLB,, | Performed by: OTOLARYNGOLOGY

## 2022-04-13 PROCEDURE — 1101F PR PT FALLS ASSESS DOC 0-1 FALLS W/OUT INJ PAST YR: ICD-10-PCS | Mod: CPTII,S$GLB,, | Performed by: PHYSICIAN ASSISTANT

## 2022-04-13 PROCEDURE — 99999 PR PBB SHADOW E&M-EST. PATIENT-LVL V: CPT | Mod: PBBFAC,,, | Performed by: PHYSICIAN ASSISTANT

## 2022-04-13 PROCEDURE — 4010F PR ACE/ARB THEARPY RXD/TAKEN: ICD-10-PCS | Mod: CPTII,S$GLB,, | Performed by: PHYSICIAN ASSISTANT

## 2022-04-13 PROCEDURE — 80076 HEPATIC FUNCTION PANEL: CPT | Performed by: PHYSICIAN ASSISTANT

## 2022-04-13 PROCEDURE — 3074F PR MOST RECENT SYSTOLIC BLOOD PRESSURE < 130 MM HG: ICD-10-PCS | Mod: CPTII,S$GLB,, | Performed by: OTOLARYNGOLOGY

## 2022-04-13 PROCEDURE — 1159F PR MEDICATION LIST DOCUMENTED IN MEDICAL RECORD: ICD-10-PCS | Mod: CPTII,S$GLB,, | Performed by: PHYSICIAN ASSISTANT

## 2022-04-13 PROCEDURE — 3075F SYST BP GE 130 - 139MM HG: CPT | Mod: CPTII,S$GLB,, | Performed by: PHYSICIAN ASSISTANT

## 2022-04-13 PROCEDURE — 99999 PR PBB SHADOW E&M-EST. PATIENT-LVL V: ICD-10-PCS | Mod: PBBFAC,,, | Performed by: PHYSICIAN ASSISTANT

## 2022-04-13 PROCEDURE — 3078F DIAST BP <80 MM HG: CPT | Mod: CPTII,S$GLB,, | Performed by: OTOLARYNGOLOGY

## 2022-04-13 PROCEDURE — 69210 REMOVE IMPACTED EAR WAX UNI: CPT | Mod: S$GLB,,, | Performed by: OTOLARYNGOLOGY

## 2022-04-13 PROCEDURE — 36415 COLL VENOUS BLD VENIPUNCTURE: CPT | Performed by: PHYSICIAN ASSISTANT

## 2022-04-13 PROCEDURE — 3051F HG A1C>EQUAL 7.0%<8.0%: CPT | Mod: CPTII,S$GLB,, | Performed by: OTOLARYNGOLOGY

## 2022-04-13 PROCEDURE — 3288F PR FALLS RISK ASSESSMENT DOCUMENTED: ICD-10-PCS | Mod: CPTII,S$GLB,, | Performed by: OTOLARYNGOLOGY

## 2022-04-13 PROCEDURE — 4010F ACE/ARB THERAPY RXD/TAKEN: CPT | Mod: CPTII,S$GLB,, | Performed by: OTOLARYNGOLOGY

## 2022-04-13 PROCEDURE — 1101F PT FALLS ASSESS-DOCD LE1/YR: CPT | Mod: CPTII,S$GLB,, | Performed by: PHYSICIAN ASSISTANT

## 2022-04-13 PROCEDURE — 3008F BODY MASS INDEX DOCD: CPT | Mod: CPTII,S$GLB,, | Performed by: PHYSICIAN ASSISTANT

## 2022-04-13 PROCEDURE — 1101F PR PT FALLS ASSESS DOC 0-1 FALLS W/OUT INJ PAST YR: ICD-10-PCS | Mod: CPTII,S$GLB,, | Performed by: OTOLARYNGOLOGY

## 2022-04-13 PROCEDURE — 3051F PR MOST RECENT HEMOGLOBIN A1C LEVEL 7.0 - < 8.0%: ICD-10-PCS | Mod: CPTII,S$GLB,, | Performed by: OTOLARYNGOLOGY

## 2022-04-13 PROCEDURE — 3072F LOW RISK FOR RETINOPATHY: CPT | Mod: CPTII,S$GLB,, | Performed by: PHYSICIAN ASSISTANT

## 2022-04-13 PROCEDURE — 1159F MED LIST DOCD IN RCRD: CPT | Mod: CPTII,S$GLB,, | Performed by: PHYSICIAN ASSISTANT

## 2022-04-13 PROCEDURE — 3079F PR MOST RECENT DIASTOLIC BLOOD PRESSURE 80-89 MM HG: ICD-10-PCS | Mod: CPTII,S$GLB,, | Performed by: PHYSICIAN ASSISTANT

## 2022-04-13 PROCEDURE — 3288F FALL RISK ASSESSMENT DOCD: CPT | Mod: CPTII,S$GLB,, | Performed by: OTOLARYNGOLOGY

## 2022-04-13 PROCEDURE — 3051F PR MOST RECENT HEMOGLOBIN A1C LEVEL 7.0 - < 8.0%: ICD-10-PCS | Mod: CPTII,S$GLB,, | Performed by: PHYSICIAN ASSISTANT

## 2022-04-13 PROCEDURE — 1160F RVW MEDS BY RX/DR IN RCRD: CPT | Mod: CPTII,S$GLB,, | Performed by: PHYSICIAN ASSISTANT

## 2022-04-13 PROCEDURE — 1101F PT FALLS ASSESS-DOCD LE1/YR: CPT | Mod: CPTII,S$GLB,, | Performed by: OTOLARYNGOLOGY

## 2022-04-13 PROCEDURE — 3072F LOW RISK FOR RETINOPATHY: CPT | Mod: CPTII,S$GLB,, | Performed by: OTOLARYNGOLOGY

## 2022-04-13 PROCEDURE — 3074F SYST BP LT 130 MM HG: CPT | Mod: CPTII,S$GLB,, | Performed by: OTOLARYNGOLOGY

## 2022-04-13 PROCEDURE — 99214 PR OFFICE/OUTPT VISIT, EST, LEVL IV, 30-39 MIN: ICD-10-PCS | Mod: S$GLB,,, | Performed by: PHYSICIAN ASSISTANT

## 2022-04-13 PROCEDURE — 4010F PR ACE/ARB THEARPY RXD/TAKEN: ICD-10-PCS | Mod: CPTII,S$GLB,, | Performed by: OTOLARYNGOLOGY

## 2022-04-13 PROCEDURE — 3288F FALL RISK ASSESSMENT DOCD: CPT | Mod: CPTII,S$GLB,, | Performed by: PHYSICIAN ASSISTANT

## 2022-04-13 PROCEDURE — 99999 PR PBB SHADOW E&M-EST. PATIENT-LVL I: ICD-10-PCS | Mod: PBBFAC,,, | Performed by: AUDIOLOGIST-HEARING AID FITTER

## 2022-04-13 PROCEDURE — 99999 PR PBB SHADOW E&M-EST. PATIENT-LVL III: CPT | Mod: PBBFAC,,, | Performed by: OTOLARYNGOLOGY

## 2022-04-13 PROCEDURE — 3079F DIAST BP 80-89 MM HG: CPT | Mod: CPTII,S$GLB,, | Performed by: PHYSICIAN ASSISTANT

## 2022-04-13 PROCEDURE — 99214 OFFICE O/P EST MOD 30 MIN: CPT | Mod: S$GLB,,, | Performed by: PHYSICIAN ASSISTANT

## 2022-04-13 PROCEDURE — 80061 LIPID PANEL: CPT | Performed by: PHYSICIAN ASSISTANT

## 2022-04-13 PROCEDURE — 3072F PR LOW RISK FOR RETINOPATHY: ICD-10-PCS | Mod: CPTII,S$GLB,, | Performed by: OTOLARYNGOLOGY

## 2022-04-13 PROCEDURE — 4010F ACE/ARB THERAPY RXD/TAKEN: CPT | Mod: CPTII,S$GLB,, | Performed by: PHYSICIAN ASSISTANT

## 2022-04-13 PROCEDURE — 99999 PR PBB SHADOW E&M-EST. PATIENT-LVL III: ICD-10-PCS | Mod: PBBFAC,,, | Performed by: OTOLARYNGOLOGY

## 2022-04-13 PROCEDURE — 3078F PR MOST RECENT DIASTOLIC BLOOD PRESSURE < 80 MM HG: ICD-10-PCS | Mod: CPTII,S$GLB,, | Performed by: OTOLARYNGOLOGY

## 2022-04-13 PROCEDURE — 1126F AMNT PAIN NOTED NONE PRSNT: CPT | Mod: CPTII,S$GLB,, | Performed by: OTOLARYNGOLOGY

## 2022-04-13 NOTE — Clinical Note
Patient seen by you in March.  On her labs live enzymes were elevated, she was advised to d/c statin.  I saw her for follow up last week.  Liver enzymes are still mildly elevated, somewhat improved.  Hepatitis panel negative.  Cholesterol is elevated since d/c statin.  She is due to follow up with you in June.  Would like me to do anything additionally prior to that time?  Liver u/s?  Alternative cholesterol medication?  Thank you for your assistance.

## 2022-04-13 NOTE — PROGRESS NOTES
Referring Provider:    Aaareferral Self  No address on file  Subjective:   Patient: Cassandra Campos [id number], :1950   Visit date:2022 11:38 AM    Chief Complaint: see below  HPI:         Prior notes reviewed  Clinical documentation obtained by nursing staff reviewed.     Ear- slowly progressive hearing loss AU.  Recent audiogram with symmetric SNHL bilaterally.  Needs medical clearance.  Notes hearing has worsened more recently on the left with some pressure AU.    Nose- chronic crusting secondary to a chronic perforation.  No surgery or trauma.  +History of cocaine, no longer using.        Objective:     Physical Exam:  Vitals:  /75   Temp 97.9 °F (36.6 °C) (Temporal)   General appearance:  Well developed, well nourished    Ears:  Bilateral ears completely impacted with cerumen    Nose:  No masses/lesions of external nose, nasal mucosa, and turbinates were within normal limits.  Large central perforation with crusting present.     Mouth:  No mass/lesion of lips, teeth, gums, hard/soft palate, tongue, tonsils, or oropharynx.    Neck & Lymphatics:  No cervical lymphadenopathy, no neck mass/crepitus/ asymmetry, trachea is midline, no thyroid enlargement/tenderness/mass.              []  Data Reviewed:    Lab Results   Component Value Date    WBC 7.56 2021    HGB 13.8 2021    HCT 42.6 2021    MCV 77 (L) 2021    EOSINOPHIL 2.1 2021         Patient: Cassandra Campos [id number], :1950  Procedure date:2022  Patient's medications, allergies, past medical, surgical, social and family histories were reviewed and updated as appropriate.  Chief Complaint:  Cerumen Impaction (Tried to clean wax with Q tips)    HPI:  Cassandra is a 71 y.o. female with the history of present illness as discussed in the clinic note from today.  During this unrelated patient encounter I observed impacted cerumen.  The otoscopic examination of the tympanic membrane was not possible due  to copious cerumen impaction.      Procedure: The patient was in agreement with the examination and debridement of the ears. Removal of the cerumen required a high level of expertise and use of an operating microscope and multiple micro-instruments.     With the patient in the supine position, we used the operating microscope to examine both ears with the appropriate sized ear speculum.  A variety of sterile, micro-instruments were utilized to remove the cerumen atraumatically.  I performed the procedure which required a significant amount of time and effort. The tympanic membrane was then well visualized.  The patient tolerated the procedure well and there were no complications.    Findings:   Right ear had significant wax, the EAC was normal, and the tympanic membrane was intact with no evidence of middle ear fluid.    Left ear had significant wax, the EAC was normal, and the tympanic membrane was intact with no evidence of middle ear fluid.          Assessment & Plan:   Nasal septal perforation    Sensorineural hearing loss (SNHL) of both ears    Bilateral impacted cerumen    History of cocaine abuse        -     Cerumen Impaction - Cassandra has cerumen in both ear(s).  We discussed preventative measures and treatment options.  Q-tips must be avoided, instead the ears can be cleaned with OTC ear rinses (or mineral oil).  If the cerumen impacts the ear canal and causes hearing loss or infection she needs to follow-up in the clinic for treatment and cleaning.    SNHL- clear for hearing aids bilaterally    Septal perforation- recommend consistent use of nasal saline to assist in clearance of crusting.      Thank you for allowing me to participate in the care of Cassandra.       Jorge A Pan MD, FACS  [facility]   [facility]  [address]  [place] [address]  P: [phone]  F: [phone]

## 2022-04-13 NOTE — PROGRESS NOTES
"Subjective:      Patient ID: Cassandra Campos is a 71 y.o. female.    Chief Complaint: Follow-up    Patient is new to me, being seen today for follow up.     Liver labs completed, liver enzymes elevated, recommended she d/c statin    Last visit March 2022 with Dr. Zhang.     Review of Systems   Constitutional: Negative for chills, diaphoresis and fever.   HENT: Negative for congestion, rhinorrhea and sore throat.    Respiratory: Negative for cough, shortness of breath and wheezing.    Cardiovascular: Negative for chest pain and leg swelling.   Gastrointestinal: Negative for abdominal pain, constipation, diarrhea, nausea and vomiting.        Denies change in stool color   Skin: Negative for rash.   Neurological: Negative for dizziness, light-headedness and headaches.       Objective:   /84   Pulse 104   Temp 98 °F (36.7 °C) (Tympanic)   Resp 18   Ht 5' 3" (1.6 m)   Wt 91.8 kg (202 lb 6.1 oz)   SpO2 96%   BMI 35.85 kg/m²   Physical Exam  Constitutional:       General: She is not in acute distress.     Appearance: Normal appearance. She is well-developed. She is not ill-appearing.   HENT:      Head: Normocephalic and atraumatic.   Cardiovascular:      Rate and Rhythm: Normal rate and regular rhythm.      Heart sounds: Normal heart sounds. No murmur heard.  Pulmonary:      Effort: Pulmonary effort is normal. No respiratory distress.      Breath sounds: Normal breath sounds. No decreased breath sounds.   Abdominal:      General: Bowel sounds are normal.      Palpations: Abdomen is soft.      Tenderness: There is no abdominal tenderness.   Musculoskeletal:      Right lower leg: No edema.      Left lower leg: No edema.   Skin:     General: Skin is warm and dry.      Findings: No rash.   Psychiatric:         Speech: Speech normal.         Behavior: Behavior normal.         Thought Content: Thought content normal.       Assessment:      1. Elevated liver enzymes    2. Hyperlipidemia associated with type 2 " diabetes mellitus    3. Primary hypertension       Plan:   Elevated liver enzymes  -     Hepatic Function Panel; Future; Expected date: 04/13/2022  -     HEPATITIS PANEL, ACUTE; Future; Expected date: 04/13/2022    Hyperlipidemia associated with type 2 diabetes mellitus  -     Lipid Panel; Future; Expected date: 04/13/2022    Primary hypertension   Controlled on current regimen     Fasting labs today   Additional recs pending above    Keep f/u with Dr. Zhang in [place]

## 2022-04-13 NOTE — PROGRESS NOTES
Cassandra Campos was scheduled 04/13/2022 for an audiological evaluation.  However, patient had comprehensive audiogram last month at [facility]. She is here today for cerumen impaction and medical clearance so that she may return to [facility] for hearing aids.     Otoscopy confirms complete cerumen impaction for the right ear and complete cerumen impaction for the left ear. She followed with ENT today. Repeat audiogram is not needed. Audiogram from 03/09/2022 from [facility] scanned into patient media.

## 2022-04-14 LAB
HAV IGM SERPL QL IA: NEGATIVE
HBV CORE IGM SERPL QL IA: NEGATIVE
HBV SURFACE AG SERPL QL IA: NEGATIVE
HCV AB SERPL QL IA: NEGATIVE

## 2022-04-19 DIAGNOSIS — R74.8 ELEVATED LIVER ENZYMES: Primary | ICD-10-CM

## 2022-04-19 NOTE — PROGRESS NOTES
Please advise patient I discussed her recent results with Dr. Zhang.  He recommends liver ultrasound d/t persistently elevated liver enzymes. Please schedule.

## 2022-04-21 ENCOUNTER — HOSPITAL ENCOUNTER (OUTPATIENT)
Dept: RADIOLOGY | Facility: HOSPITAL | Age: 72
Discharge: HOME | End: 2022-04-21
Attending: PHYSICIAN ASSISTANT
Payer: MEDICARE

## 2022-04-21 ENCOUNTER — TELEPHONE (OUTPATIENT)
Dept: HEPATOLOGY | Facility: CLINIC | Age: 72
End: 2022-04-21
Payer: MEDICARE

## 2022-04-21 DIAGNOSIS — R74.8 ELEVATED LIVER ENZYMES: ICD-10-CM

## 2022-04-21 DIAGNOSIS — R74.8 ELEVATED LIVER ENZYMES: Primary | ICD-10-CM

## 2022-04-21 PROCEDURE — 76705 ECHO EXAM OF ABDOMEN: CPT | Mod: TC

## 2022-04-21 PROCEDURE — 76705 US ABDOMEN LIMITED_LIVER: ICD-10-PCS | Mod: 26,,, | Performed by: RADIOLOGY

## 2022-04-21 PROCEDURE — 76705 ECHO EXAM OF ABDOMEN: CPT | Mod: 26,,, | Performed by: RADIOLOGY

## 2022-04-21 NOTE — TELEPHONE ENCOUNTER
----- Message from Jin Castro sent at 4/21/2022  2:49 PM CDT -----  Contact: self  Cassandra Campos would like a call back at [phone], in regards to if she has to fast or not before her appointment on 4/27/22.

## 2022-04-21 NOTE — TELEPHONE ENCOUNTER
Called patient to confirm appt for next Wednesday with Dr. Roque and patient wanted to know if she needed to fast patient was informed she does not need to fast. Patient verbalized understanding.

## 2022-04-27 ENCOUNTER — LAB VISIT (OUTPATIENT)
Dept: LAB | Facility: HOSPITAL | Age: 72
End: 2022-04-27
Attending: INTERNAL MEDICINE
Payer: MEDICARE

## 2022-04-27 ENCOUNTER — OFFICE VISIT (OUTPATIENT)
Dept: HEPATOLOGY | Facility: CLINIC | Age: 72
End: 2022-04-27
Payer: MEDICARE

## 2022-04-27 VITALS
SYSTOLIC BLOOD PRESSURE: 140 MMHG | DIASTOLIC BLOOD PRESSURE: 80 MMHG | HEIGHT: 63 IN | WEIGHT: 207.69 LBS | HEART RATE: 74 BPM | BODY MASS INDEX: 36.8 KG/M2

## 2022-04-27 DIAGNOSIS — E88.810 METABOLIC SYNDROME: Primary | ICD-10-CM

## 2022-04-27 DIAGNOSIS — R74.8 ELEVATED LIVER ENZYMES: ICD-10-CM

## 2022-04-27 LAB
FERRITIN SERPL-MCNC: 36 NG/ML (ref 20–300)
IGA SERPL-MCNC: 418 MG/DL (ref 40–350)
IGG SERPL-MCNC: 2133 MG/DL (ref 650–1600)
IGM SERPL-MCNC: 88 MG/DL (ref 50–300)

## 2022-04-27 PROCEDURE — 1126F PR PAIN SEVERITY QUANTIFIED, NO PAIN PRESENT: ICD-10-PCS | Mod: CPTII,S$GLB,, | Performed by: INTERNAL MEDICINE

## 2022-04-27 PROCEDURE — 99999 PR PBB SHADOW E&M-EST. PATIENT-LVL V: CPT | Mod: PBBFAC,,, | Performed by: INTERNAL MEDICINE

## 2022-04-27 PROCEDURE — 3077F SYST BP >= 140 MM HG: CPT | Mod: CPTII,S$GLB,, | Performed by: INTERNAL MEDICINE

## 2022-04-27 PROCEDURE — 99204 PR OFFICE/OUTPT VISIT, NEW, LEVL IV, 45-59 MIN: ICD-10-PCS | Mod: S$GLB,,, | Performed by: INTERNAL MEDICINE

## 2022-04-27 PROCEDURE — 86039 ANTINUCLEAR ANTIBODIES (ANA): CPT | Performed by: INTERNAL MEDICINE

## 2022-04-27 PROCEDURE — 86256 FLUORESCENT ANTIBODY TITER: CPT | Mod: 91 | Performed by: INTERNAL MEDICINE

## 2022-04-27 PROCEDURE — 86235 NUCLEAR ANTIGEN ANTIBODY: CPT | Performed by: INTERNAL MEDICINE

## 2022-04-27 PROCEDURE — 3008F PR BODY MASS INDEX (BMI) DOCUMENTED: ICD-10-PCS | Mod: CPTII,S$GLB,, | Performed by: INTERNAL MEDICINE

## 2022-04-27 PROCEDURE — 82784 ASSAY IGA/IGD/IGG/IGM EACH: CPT | Performed by: INTERNAL MEDICINE

## 2022-04-27 PROCEDURE — 1159F MED LIST DOCD IN RCRD: CPT | Mod: CPTII,S$GLB,, | Performed by: INTERNAL MEDICINE

## 2022-04-27 PROCEDURE — 3051F PR MOST RECENT HEMOGLOBIN A1C LEVEL 7.0 - < 8.0%: ICD-10-PCS | Mod: CPTII,S$GLB,, | Performed by: INTERNAL MEDICINE

## 2022-04-27 PROCEDURE — 3051F HG A1C>EQUAL 7.0%<8.0%: CPT | Mod: CPTII,S$GLB,, | Performed by: INTERNAL MEDICINE

## 2022-04-27 PROCEDURE — 86038 ANTINUCLEAR ANTIBODIES: CPT | Performed by: INTERNAL MEDICINE

## 2022-04-27 PROCEDURE — 1159F PR MEDICATION LIST DOCUMENTED IN MEDICAL RECORD: ICD-10-PCS | Mod: CPTII,S$GLB,, | Performed by: INTERNAL MEDICINE

## 2022-04-27 PROCEDURE — 3008F BODY MASS INDEX DOCD: CPT | Mod: CPTII,S$GLB,, | Performed by: INTERNAL MEDICINE

## 2022-04-27 PROCEDURE — 3072F PR LOW RISK FOR RETINOPATHY: ICD-10-PCS | Mod: CPTII,S$GLB,, | Performed by: INTERNAL MEDICINE

## 2022-04-27 PROCEDURE — 3079F DIAST BP 80-89 MM HG: CPT | Mod: CPTII,S$GLB,, | Performed by: INTERNAL MEDICINE

## 2022-04-27 PROCEDURE — 3079F PR MOST RECENT DIASTOLIC BLOOD PRESSURE 80-89 MM HG: ICD-10-PCS | Mod: CPTII,S$GLB,, | Performed by: INTERNAL MEDICINE

## 2022-04-27 PROCEDURE — 99499 UNLISTED E&M SERVICE: CPT | Mod: S$GLB,,, | Performed by: INTERNAL MEDICINE

## 2022-04-27 PROCEDURE — 86376 MICROSOMAL ANTIBODY EACH: CPT | Performed by: INTERNAL MEDICINE

## 2022-04-27 PROCEDURE — 1101F PT FALLS ASSESS-DOCD LE1/YR: CPT | Mod: CPTII,S$GLB,, | Performed by: INTERNAL MEDICINE

## 2022-04-27 PROCEDURE — 4010F PR ACE/ARB THEARPY RXD/TAKEN: ICD-10-PCS | Mod: CPTII,S$GLB,, | Performed by: INTERNAL MEDICINE

## 2022-04-27 PROCEDURE — 1101F PR PT FALLS ASSESS DOC 0-1 FALLS W/OUT INJ PAST YR: ICD-10-PCS | Mod: CPTII,S$GLB,, | Performed by: INTERNAL MEDICINE

## 2022-04-27 PROCEDURE — 3288F FALL RISK ASSESSMENT DOCD: CPT | Mod: CPTII,S$GLB,, | Performed by: INTERNAL MEDICINE

## 2022-04-27 PROCEDURE — 4010F ACE/ARB THERAPY RXD/TAKEN: CPT | Mod: CPTII,S$GLB,, | Performed by: INTERNAL MEDICINE

## 2022-04-27 PROCEDURE — 82728 ASSAY OF FERRITIN: CPT | Performed by: INTERNAL MEDICINE

## 2022-04-27 PROCEDURE — 1160F RVW MEDS BY RX/DR IN RCRD: CPT | Mod: CPTII,S$GLB,, | Performed by: INTERNAL MEDICINE

## 2022-04-27 PROCEDURE — 86235 NUCLEAR ANTIGEN ANTIBODY: CPT | Mod: 59 | Performed by: INTERNAL MEDICINE

## 2022-04-27 PROCEDURE — 99499 RISK ADDL DX/OHS AUDIT: ICD-10-PCS | Mod: S$GLB,,, | Performed by: INTERNAL MEDICINE

## 2022-04-27 PROCEDURE — 99204 OFFICE O/P NEW MOD 45 MIN: CPT | Mod: S$GLB,,, | Performed by: INTERNAL MEDICINE

## 2022-04-27 PROCEDURE — 3072F LOW RISK FOR RETINOPATHY: CPT | Mod: CPTII,S$GLB,, | Performed by: INTERNAL MEDICINE

## 2022-04-27 PROCEDURE — 3077F PR MOST RECENT SYSTOLIC BLOOD PRESSURE >= 140 MM HG: ICD-10-PCS | Mod: CPTII,S$GLB,, | Performed by: INTERNAL MEDICINE

## 2022-04-27 PROCEDURE — 3288F PR FALLS RISK ASSESSMENT DOCUMENTED: ICD-10-PCS | Mod: CPTII,S$GLB,, | Performed by: INTERNAL MEDICINE

## 2022-04-27 PROCEDURE — 99999 PR PBB SHADOW E&M-EST. PATIENT-LVL V: ICD-10-PCS | Mod: PBBFAC,,, | Performed by: INTERNAL MEDICINE

## 2022-04-27 PROCEDURE — 1160F PR REVIEW ALL MEDS BY PRESCRIBER/CLIN PHARMACIST DOCUMENTED: ICD-10-PCS | Mod: CPTII,S$GLB,, | Performed by: INTERNAL MEDICINE

## 2022-04-27 PROCEDURE — 36415 COLL VENOUS BLD VENIPUNCTURE: CPT | Performed by: INTERNAL MEDICINE

## 2022-04-27 PROCEDURE — 1126F AMNT PAIN NOTED NONE PRSNT: CPT | Mod: CPTII,S$GLB,, | Performed by: INTERNAL MEDICINE

## 2022-04-28 LAB
ANA PATTERN 1: NORMAL
ANA SER QL IF: POSITIVE
ANA TITR SER IF: NORMAL {TITER}

## 2022-04-29 ENCOUNTER — TELEPHONE (OUTPATIENT)
Dept: HEPATOLOGY | Facility: CLINIC | Age: 72
End: 2022-04-29
Payer: MEDICARE

## 2022-04-29 LAB
MITOCHONDRIA AB TITR SER IF: NORMAL {TITER}
SMOOTH MUSCLE AB TITR SER IF: NORMAL {TITER}

## 2022-04-29 NOTE — TELEPHONE ENCOUNTER
Spoke with patient who asks to receive a call on Monday, 05/02/2022 after 12:00 pm to schedule. She has to check her school calendar and will be unable to answer due to LEAP testing.

## 2022-04-29 NOTE — TELEPHONE ENCOUNTER
----- Message from Akosua Gupta MA sent at 4/27/2022  3:14 PM CDT -----  Regarding: FIbroscan  Please call and schedule patient US Elastography Liver

## 2022-05-02 ENCOUNTER — TELEPHONE (OUTPATIENT)
Dept: GASTROENTEROLOGY | Facility: CLINIC | Age: 72
End: 2022-05-02

## 2022-05-02 LAB
ANTI SM ANTIBODY: 0.1 RATIO (ref 0–0.99)
ANTI SM/RNP ANTIBODY: 0.15 RATIO (ref 0–0.99)
ANTI-SM INTERPRETATION: NEGATIVE
ANTI-SM/RNP INTERPRETATION: NEGATIVE
ANTI-SSA ANTIBODY: 4.47 RATIO (ref 0–0.99)
ANTI-SSA INTERPRETATION: POSITIVE
ANTI-SSB ANTIBODY: 0.15 RATIO (ref 0–0.99)
ANTI-SSB INTERPRETATION: NEGATIVE
DSDNA AB SER-ACNC: ABNORMAL [IU]/ML
LKM AB SER-ACNC: 1.8 UNITS

## 2022-05-02 NOTE — TELEPHONE ENCOUNTER
Returned call and spoke with patient.   She wanted to schedule a fibroscan.  Advised her that the person who schedules those are gone for the day and will get back with her tomorrow.  Patient verbalized understanding.

## 2022-05-02 NOTE — TELEPHONE ENCOUNTER
----- Message from Rosibel Lorenz sent at 4/29/2022  1:45 PM CDT -----  Regarding: appt  Contact: pt  The pt request a call to schedule a elastography appt, no additional info given and can be reached at [phone]///[email or web address]

## 2022-05-03 ENCOUNTER — PATIENT MESSAGE (OUTPATIENT)
Dept: HEPATOLOGY | Facility: CLINIC | Age: 72
End: 2022-05-03
Payer: MEDICARE

## 2022-05-04 ENCOUNTER — PATIENT MESSAGE (OUTPATIENT)
Dept: HEPATOLOGY | Facility: CLINIC | Age: 72
End: 2022-05-04
Payer: MEDICARE

## 2022-05-05 ENCOUNTER — PROCEDURE VISIT (OUTPATIENT)
Dept: HEPATOLOGY | Facility: CLINIC | Age: 72
End: 2022-05-05
Attending: INTERNAL MEDICINE
Payer: MEDICARE

## 2022-05-05 ENCOUNTER — PATIENT MESSAGE (OUTPATIENT)
Dept: HEPATOLOGY | Facility: CLINIC | Age: 72
End: 2022-05-05

## 2022-05-05 VITALS — WEIGHT: 203.5 LBS | BODY MASS INDEX: 36.06 KG/M2 | HEIGHT: 63 IN

## 2022-05-05 DIAGNOSIS — R74.8 ELEVATED LIVER ENZYMES: ICD-10-CM

## 2022-05-05 PROCEDURE — 91200 LIVER ELASTOGRAPHY: CPT | Mod: S$GLB,,, | Performed by: INTERNAL MEDICINE

## 2022-05-05 PROCEDURE — 91200 PR LIVER ELASTOGRAPHY W/OUT IMAG W/INTERP & REPORT: ICD-10-PCS | Mod: S$GLB,,, | Performed by: INTERNAL MEDICINE

## 2022-05-05 NOTE — PROGRESS NOTES
Subjective:     Cassandra Campos is here for evaluation of abnormal LFTs    History of Present Illness:  Cassandra Campos is here for eval of abnormal LFTs, she had persistent elevation of liver enzymes since 2019, prior to that she had fluctuating levels of for elevated transaminases, normal total bilirubin and the noted only 1 time elevation of alkaline phosphatase.  Mainly ALT is elevated, but AST and ALT are less than twice the upper limit of normal.  denies liver related complaints    Duration of abnormality- since 2020  Medications/OTC/Herbal- denies   ETOH- occasional  Metabolic issues- HTN, DM< Hyperlipidemia   BMI-36  Family Hx-none      Review of Systems   Constitutional: Negative for fatigue and fever.   Gastrointestinal: Negative for abdominal pain, blood in stool, nausea and vomiting.   Musculoskeletal: Positive for arthralgias and back pain.   Hematological: Does not bruise/bleed easily.   Psychiatric/Behavioral: Negative for confusion.       Objective:     Physical Exam  Vitals reviewed.   Constitutional:       Appearance: Normal appearance. She is obese.   Eyes:      General: No scleral icterus.  Abdominal:      General: Bowel sounds are normal. There is no distension.      Palpations: There is no mass.      Tenderness: There is no abdominal tenderness.   Musculoskeletal:      Right lower leg: No edema.      Left lower leg: No edema.   Skin:     General: Skin is warm and dry.      Coloration: Skin is not jaundiced.   Neurological:      Mental Status: She is alert and oriented to person, place, and time.   Psychiatric:         Thought Content: Thought content normal.         Computed MELD-Na score unavailable. Necessary lab results were not found in the last year.  Computed MELD score unavailable. Necessary lab results were not found in the last year.    WBC   Date Value Ref Range Status   07/22/2021 7.56 3.90 - 12.70 K/uL Final     Hemoglobin   Date Value Ref Range Status   07/22/2021 13.8 12.0 -  16.0 g/dL Final     Hematocrit   Date Value Ref Range Status   07/22/2021 42.6 37.0 - 48.5 % Final     Platelets   Date Value Ref Range Status   07/22/2021 263 150 - 450 K/uL Final     BUN   Date Value Ref Range Status   03/01/2022 11 8 - 23 mg/dL Final     Creatinine   Date Value Ref Range Status   03/01/2022 0.7 0.5 - 1.4 mg/dL Final     Glucose   Date Value Ref Range Status   03/01/2022 104 70 - 110 mg/dL Final     Calcium   Date Value Ref Range Status   03/01/2022 9.7 8.7 - 10.5 mg/dL Final     Sodium   Date Value Ref Range Status   03/01/2022 143 136 - 145 mmol/L Final     Potassium   Date Value Ref Range Status   03/01/2022 4.2 3.5 - 5.1 mmol/L Final     Chloride   Date Value Ref Range Status   03/01/2022 103 95 - 110 mmol/L Final     Magnesium   Date Value Ref Range Status   02/14/2017 1.6 1.6 - 2.6 mg/dL Final     AST   Date Value Ref Range Status   04/13/2022 52 (H) 10 - 40 U/L Final     ALT   Date Value Ref Range Status   04/13/2022 58 (H) 10 - 44 U/L Final     Alkaline Phosphatase   Date Value Ref Range Status   04/13/2022 111 55 - 135 U/L Final     Total Bilirubin   Date Value Ref Range Status   04/13/2022 0.3 0.1 - 1.0 mg/dL Final     Comment:     For infants and newborns, interpretation of results should be based  on gestational age, weight and in agreement with clinical  observations.    Premature Infant recommended reference ranges:  Up to 24 hours.............<8.0 mg/dL  Up to 48 hours............<12.0 mg/dL  3-5 days..................<15.0 mg/dL  6-29 days.................<15.0 mg/dL       Albumin   Date Value Ref Range Status   04/13/2022 3.7 3.5 - 5.2 g/dL Final     No results found for: INR      Assessment/Plan:       1.Abnormal LFTs:  Will initiate work up to rule out infectious/autoimmune/genetic disorders of the liver  Suspect she may have underlying fatty liver disease, certainly she has the risk factors.  She may need liver biopsy if workup is negative  Will also obtain VCTE to assess the  steatosis and fibrosis.    2.Obesity-class -1  Discussed dietary recommendations- Low calorie, low carbohydrate, high protein diet with goal of loosing >3-5% to improve steatosis and >7-10% to improve histological changes if present    3.Metabolic syndrome:   Reduction of risk factors for CVD  Continued treatment for HTN  Optimization of blood glucose control.  Continue lipid-lowering therapy  Increase physical activity       I have reviewed existing labs, imaging, procedures. Educated patient about disease process, prognosis. Ordered required labs, images and discussed treatment plan.     Loretta Roque MD  Transplant Hepatologist  Dept of Hepatology, [place] [facility]

## 2022-05-05 NOTE — PROCEDURES
Fibroscan Procedure     Name: Cassandra Campos  Date of Procedure : 2022   :: Loretta Roque MD  Diagnosis: NAFLD    Probe: M    Fibroscan readin.0 KPa    Fibrosis:F3     CAP readin dB/m    Steatosis: :S3       Interpretation: moderate steatosis and moderate  fibrosis

## 2022-06-08 ENCOUNTER — OFFICE VISIT (OUTPATIENT)
Dept: HEPATOLOGY | Facility: CLINIC | Age: 72
End: 2022-06-08
Payer: MEDICARE

## 2022-06-08 ENCOUNTER — TELEPHONE (OUTPATIENT)
Dept: RADIOLOGY | Facility: HOSPITAL | Age: 72
End: 2022-06-08
Payer: MEDICARE

## 2022-06-08 VITALS
DIASTOLIC BLOOD PRESSURE: 80 MMHG | BODY MASS INDEX: 35.9 KG/M2 | HEART RATE: 76 BPM | SYSTOLIC BLOOD PRESSURE: 130 MMHG | WEIGHT: 202.63 LBS | HEIGHT: 63 IN

## 2022-06-08 DIAGNOSIS — R74.8 ELEVATED LIVER ENZYMES: Primary | ICD-10-CM

## 2022-06-08 PROCEDURE — 1126F PR PAIN SEVERITY QUANTIFIED, NO PAIN PRESENT: ICD-10-PCS | Mod: CPTII,S$GLB,, | Performed by: INTERNAL MEDICINE

## 2022-06-08 PROCEDURE — 3075F PR MOST RECENT SYSTOLIC BLOOD PRESS GE 130-139MM HG: ICD-10-PCS | Mod: CPTII,S$GLB,, | Performed by: INTERNAL MEDICINE

## 2022-06-08 PROCEDURE — 3008F PR BODY MASS INDEX (BMI) DOCUMENTED: ICD-10-PCS | Mod: CPTII,S$GLB,, | Performed by: INTERNAL MEDICINE

## 2022-06-08 PROCEDURE — 3051F HG A1C>EQUAL 7.0%<8.0%: CPT | Mod: CPTII,S$GLB,, | Performed by: INTERNAL MEDICINE

## 2022-06-08 PROCEDURE — 99499 UNLISTED E&M SERVICE: CPT | Mod: S$GLB,,, | Performed by: INTERNAL MEDICINE

## 2022-06-08 PROCEDURE — 1160F RVW MEDS BY RX/DR IN RCRD: CPT | Mod: CPTII,S$GLB,, | Performed by: INTERNAL MEDICINE

## 2022-06-08 PROCEDURE — 3008F BODY MASS INDEX DOCD: CPT | Mod: CPTII,S$GLB,, | Performed by: INTERNAL MEDICINE

## 2022-06-08 PROCEDURE — 3072F LOW RISK FOR RETINOPATHY: CPT | Mod: CPTII,S$GLB,, | Performed by: INTERNAL MEDICINE

## 2022-06-08 PROCEDURE — 3079F DIAST BP 80-89 MM HG: CPT | Mod: CPTII,S$GLB,, | Performed by: INTERNAL MEDICINE

## 2022-06-08 PROCEDURE — 3072F PR LOW RISK FOR RETINOPATHY: ICD-10-PCS | Mod: CPTII,S$GLB,, | Performed by: INTERNAL MEDICINE

## 2022-06-08 PROCEDURE — 99999 PR PBB SHADOW E&M-EST. PATIENT-LVL IV: ICD-10-PCS | Mod: PBBFAC,,, | Performed by: INTERNAL MEDICINE

## 2022-06-08 PROCEDURE — 1160F PR REVIEW ALL MEDS BY PRESCRIBER/CLIN PHARMACIST DOCUMENTED: ICD-10-PCS | Mod: CPTII,S$GLB,, | Performed by: INTERNAL MEDICINE

## 2022-06-08 PROCEDURE — 3051F PR MOST RECENT HEMOGLOBIN A1C LEVEL 7.0 - < 8.0%: ICD-10-PCS | Mod: CPTII,S$GLB,, | Performed by: INTERNAL MEDICINE

## 2022-06-08 PROCEDURE — 99214 PR OFFICE/OUTPT VISIT, EST, LEVL IV, 30-39 MIN: ICD-10-PCS | Mod: S$GLB,,, | Performed by: INTERNAL MEDICINE

## 2022-06-08 PROCEDURE — 1101F PR PT FALLS ASSESS DOC 0-1 FALLS W/OUT INJ PAST YR: ICD-10-PCS | Mod: CPTII,S$GLB,, | Performed by: INTERNAL MEDICINE

## 2022-06-08 PROCEDURE — 99499 RISK ADDL DX/OHS AUDIT: ICD-10-PCS | Mod: S$GLB,,, | Performed by: INTERNAL MEDICINE

## 2022-06-08 PROCEDURE — 4010F PR ACE/ARB THEARPY RXD/TAKEN: ICD-10-PCS | Mod: CPTII,S$GLB,, | Performed by: INTERNAL MEDICINE

## 2022-06-08 PROCEDURE — 99214 OFFICE O/P EST MOD 30 MIN: CPT | Mod: S$GLB,,, | Performed by: INTERNAL MEDICINE

## 2022-06-08 PROCEDURE — 99999 PR PBB SHADOW E&M-EST. PATIENT-LVL IV: CPT | Mod: PBBFAC,,, | Performed by: INTERNAL MEDICINE

## 2022-06-08 PROCEDURE — 3075F SYST BP GE 130 - 139MM HG: CPT | Mod: CPTII,S$GLB,, | Performed by: INTERNAL MEDICINE

## 2022-06-08 PROCEDURE — 3288F FALL RISK ASSESSMENT DOCD: CPT | Mod: CPTII,S$GLB,, | Performed by: INTERNAL MEDICINE

## 2022-06-08 PROCEDURE — 3288F PR FALLS RISK ASSESSMENT DOCUMENTED: ICD-10-PCS | Mod: CPTII,S$GLB,, | Performed by: INTERNAL MEDICINE

## 2022-06-08 PROCEDURE — 1159F PR MEDICATION LIST DOCUMENTED IN MEDICAL RECORD: ICD-10-PCS | Mod: CPTII,S$GLB,, | Performed by: INTERNAL MEDICINE

## 2022-06-08 PROCEDURE — 1101F PT FALLS ASSESS-DOCD LE1/YR: CPT | Mod: CPTII,S$GLB,, | Performed by: INTERNAL MEDICINE

## 2022-06-08 PROCEDURE — 4010F ACE/ARB THERAPY RXD/TAKEN: CPT | Mod: CPTII,S$GLB,, | Performed by: INTERNAL MEDICINE

## 2022-06-08 PROCEDURE — 1126F AMNT PAIN NOTED NONE PRSNT: CPT | Mod: CPTII,S$GLB,, | Performed by: INTERNAL MEDICINE

## 2022-06-08 PROCEDURE — 1159F MED LIST DOCD IN RCRD: CPT | Mod: CPTII,S$GLB,, | Performed by: INTERNAL MEDICINE

## 2022-06-08 PROCEDURE — 3079F PR MOST RECENT DIASTOLIC BLOOD PRESSURE 80-89 MM HG: ICD-10-PCS | Mod: CPTII,S$GLB,, | Performed by: INTERNAL MEDICINE

## 2022-06-08 NOTE — PROGRESS NOTES
Subjective:     Cassandra Campos is here for evaluation of abnormal LFTs    History of Present Illness:  Cassandra Campos is here for eval of abnormal LFTs, she had persistent elevation of liver enzymes since 2019, prior to that she had fluctuating levels of for elevated transaminases, normal total bilirubin and the noted only 1 time elevation of alkaline phosphatase.  Mainly ALT is elevated, but AST and ALT are less than twice the upper limit of normal.  denies liver related complaints    Duration of abnormality- since 2020  Medications/OTC/Herbal- denies   ETOH- occasional  Metabolic issues- HTN, DM< Hyperlipidemia   BMI-36  Family Hx-none    Interval history 06/08/2022;  Since her last visit 5 weeks ago, she lost 5 lb.  She changed her dietary habits to low-calorie, low-carbohydrate, high-protein diet, also started exercising.  She is motivated to lose 15 more lbs.  Denies liver related complaints, no hospitalizations, no new medication.      Review of Systems   Constitutional: Negative for fatigue and fever.   Gastrointestinal: Negative for abdominal pain, blood in stool, nausea and vomiting.   Musculoskeletal: Positive for arthralgias and back pain.   Hematological: Does not bruise/bleed easily.   Psychiatric/Behavioral: Negative for confusion.       Objective:     Physical Exam  Vitals reviewed.   Constitutional:       Appearance: Normal appearance. She is obese.   Eyes:      General: No scleral icterus.  Abdominal:      General: Bowel sounds are normal. There is no distension.      Palpations: There is no mass.      Tenderness: There is no abdominal tenderness.   Musculoskeletal:      Right lower leg: No edema.      Left lower leg: No edema.   Skin:     General: Skin is warm and dry.      Coloration: Skin is not jaundiced.   Neurological:      Mental Status: She is alert and oriented to person, place, and time.   Psychiatric:         Thought Content: Thought content normal.         Computed MELD-Na score  unavailable. Necessary lab results were not found in the last year.  Computed MELD score unavailable. Necessary lab results were not found in the last year.    WBC   Date Value Ref Range Status   07/22/2021 7.56 3.90 - 12.70 K/uL Final     Hemoglobin   Date Value Ref Range Status   07/22/2021 13.8 12.0 - 16.0 g/dL Final     Hematocrit   Date Value Ref Range Status   07/22/2021 42.6 37.0 - 48.5 % Final     Platelets   Date Value Ref Range Status   07/22/2021 263 150 - 450 K/uL Final     BUN   Date Value Ref Range Status   03/01/2022 11 8 - 23 mg/dL Final     Creatinine   Date Value Ref Range Status   03/01/2022 0.7 0.5 - 1.4 mg/dL Final     Glucose   Date Value Ref Range Status   03/01/2022 104 70 - 110 mg/dL Final     Calcium   Date Value Ref Range Status   03/01/2022 9.7 8.7 - 10.5 mg/dL Final     Sodium   Date Value Ref Range Status   03/01/2022 143 136 - 145 mmol/L Final     Potassium   Date Value Ref Range Status   03/01/2022 4.2 3.5 - 5.1 mmol/L Final     Chloride   Date Value Ref Range Status   03/01/2022 103 95 - 110 mmol/L Final     Magnesium   Date Value Ref Range Status   02/14/2017 1.6 1.6 - 2.6 mg/dL Final     AST   Date Value Ref Range Status   04/13/2022 52 (H) 10 - 40 U/L Final     ALT   Date Value Ref Range Status   04/13/2022 58 (H) 10 - 44 U/L Final     Alkaline Phosphatase   Date Value Ref Range Status   04/13/2022 111 55 - 135 U/L Final     Total Bilirubin   Date Value Ref Range Status   04/13/2022 0.3 0.1 - 1.0 mg/dL Final     Comment:     For infants and newborns, interpretation of results should be based  on gestational age, weight and in agreement with clinical  observations.    Premature Infant recommended reference ranges:  Up to 24 hours.............<8.0 mg/dL  Up to 48 hours............<12.0 mg/dL  3-5 days..................<15.0 mg/dL  6-29 days.................<15.0 mg/dL       Albumin   Date Value Ref Range Status   04/13/2022 3.7 3.5 - 5.2 g/dL Final     No results found for:  INR      Assessment/Plan:     1.Abnormal LFTs:  Work up showed positive DEBORAH  1:1280, IgG 2133, IgA 418  Suspect she may have underlying fatty liver disease, however underlying autoimmune liver disease need to be ruled out.  Risks and benefits of liver biopsy were discussed, she decided to proceed with  liver biopsy.   VCTE showed S3 steatosis and F3 fibrosis.    2.Obesity-class -1  Discussed dietary recommendations- Low calorie, low carbohydrate, high protein diet with goal of loosing >3-5% to improve steatosis and >7-10% to improve histological changes if present    3.Metabolic syndrome:   Reduction of risk factors for CVD  Continued treatment for HTN  Optimization of blood glucose control.  Continue lipid-lowering therapy  Increase physical activity       I have reviewed existing labs, imaging, procedures. Educated patient about disease process, prognosis. Ordered required labs, images and discussed treatment plan.     Loretta Roque MD  Transplant Hepatologist  Dept of Hepatology, [place] [facility]

## 2022-06-09 ENCOUNTER — TELEPHONE (OUTPATIENT)
Dept: INTERNAL MEDICINE | Facility: CLINIC | Age: 72
End: 2022-06-09

## 2022-06-09 ENCOUNTER — LAB VISIT (OUTPATIENT)
Dept: LAB | Facility: HOSPITAL | Age: 72
End: 2022-06-09
Attending: FAMILY MEDICINE
Payer: MEDICARE

## 2022-06-09 ENCOUNTER — OFFICE VISIT (OUTPATIENT)
Dept: INTERNAL MEDICINE | Facility: CLINIC | Age: 72
End: 2022-06-09
Payer: MEDICARE

## 2022-06-09 VITALS
SYSTOLIC BLOOD PRESSURE: 120 MMHG | BODY MASS INDEX: 36.01 KG/M2 | WEIGHT: 203.25 LBS | OXYGEN SATURATION: 96 % | DIASTOLIC BLOOD PRESSURE: 84 MMHG | HEART RATE: 94 BPM | HEIGHT: 63 IN

## 2022-06-09 DIAGNOSIS — F41.0 PANIC ATTACK AS REACTION TO STRESS: ICD-10-CM

## 2022-06-09 DIAGNOSIS — E78.5 HYPERLIPIDEMIA ASSOCIATED WITH TYPE 2 DIABETES MELLITUS: ICD-10-CM

## 2022-06-09 DIAGNOSIS — F43.0 PANIC ATTACK AS REACTION TO STRESS: ICD-10-CM

## 2022-06-09 DIAGNOSIS — I10 PRIMARY HYPERTENSION: ICD-10-CM

## 2022-06-09 DIAGNOSIS — E11.9 CONTROLLED TYPE 2 DIABETES MELLITUS WITHOUT COMPLICATION, WITHOUT LONG-TERM CURRENT USE OF INSULIN: ICD-10-CM

## 2022-06-09 DIAGNOSIS — R74.8 ELEVATED LIVER ENZYMES: ICD-10-CM

## 2022-06-09 DIAGNOSIS — E11.69 HYPERLIPIDEMIA ASSOCIATED WITH TYPE 2 DIABETES MELLITUS: ICD-10-CM

## 2022-06-09 DIAGNOSIS — E11.9 CONTROLLED TYPE 2 DIABETES MELLITUS WITHOUT COMPLICATION, WITHOUT LONG-TERM CURRENT USE OF INSULIN: Primary | ICD-10-CM

## 2022-06-09 LAB
ESTIMATED AVG GLUCOSE: 157 MG/DL (ref 68–131)
GLUCOSE SERPL-MCNC: 131 MG/DL (ref 70–110)
HBA1C MFR BLD: 7.1 % (ref 4–5.6)

## 2022-06-09 PROCEDURE — 1101F PT FALLS ASSESS-DOCD LE1/YR: CPT | Mod: CPTII,S$GLB,, | Performed by: FAMILY MEDICINE

## 2022-06-09 PROCEDURE — 1159F MED LIST DOCD IN RCRD: CPT | Mod: CPTII,S$GLB,, | Performed by: FAMILY MEDICINE

## 2022-06-09 PROCEDURE — 3288F PR FALLS RISK ASSESSMENT DOCUMENTED: ICD-10-PCS | Mod: CPTII,S$GLB,, | Performed by: FAMILY MEDICINE

## 2022-06-09 PROCEDURE — 3051F PR MOST RECENT HEMOGLOBIN A1C LEVEL 7.0 - < 8.0%: ICD-10-PCS | Mod: CPTII,S$GLB,, | Performed by: FAMILY MEDICINE

## 2022-06-09 PROCEDURE — 3072F LOW RISK FOR RETINOPATHY: CPT | Mod: CPTII,S$GLB,, | Performed by: FAMILY MEDICINE

## 2022-06-09 PROCEDURE — 3008F BODY MASS INDEX DOCD: CPT | Mod: CPTII,S$GLB,, | Performed by: FAMILY MEDICINE

## 2022-06-09 PROCEDURE — 1126F AMNT PAIN NOTED NONE PRSNT: CPT | Mod: CPTII,S$GLB,, | Performed by: FAMILY MEDICINE

## 2022-06-09 PROCEDURE — 1159F PR MEDICATION LIST DOCUMENTED IN MEDICAL RECORD: ICD-10-PCS | Mod: CPTII,S$GLB,, | Performed by: FAMILY MEDICINE

## 2022-06-09 PROCEDURE — 36415 COLL VENOUS BLD VENIPUNCTURE: CPT | Performed by: FAMILY MEDICINE

## 2022-06-09 PROCEDURE — 3051F HG A1C>EQUAL 7.0%<8.0%: CPT | Mod: CPTII,S$GLB,, | Performed by: FAMILY MEDICINE

## 2022-06-09 PROCEDURE — 99214 PR OFFICE/OUTPT VISIT, EST, LEVL IV, 30-39 MIN: ICD-10-PCS | Mod: S$GLB,,, | Performed by: FAMILY MEDICINE

## 2022-06-09 PROCEDURE — 99214 OFFICE O/P EST MOD 30 MIN: CPT | Mod: S$GLB,,, | Performed by: FAMILY MEDICINE

## 2022-06-09 PROCEDURE — 4010F ACE/ARB THERAPY RXD/TAKEN: CPT | Mod: CPTII,S$GLB,, | Performed by: FAMILY MEDICINE

## 2022-06-09 PROCEDURE — 3074F SYST BP LT 130 MM HG: CPT | Mod: CPTII,S$GLB,, | Performed by: FAMILY MEDICINE

## 2022-06-09 PROCEDURE — 1126F PR PAIN SEVERITY QUANTIFIED, NO PAIN PRESENT: ICD-10-PCS | Mod: CPTII,S$GLB,, | Performed by: FAMILY MEDICINE

## 2022-06-09 PROCEDURE — 82947 ASSAY GLUCOSE BLOOD QUANT: CPT | Performed by: FAMILY MEDICINE

## 2022-06-09 PROCEDURE — 99999 PR PBB SHADOW E&M-EST. PATIENT-LVL IV: ICD-10-PCS | Mod: PBBFAC,,, | Performed by: FAMILY MEDICINE

## 2022-06-09 PROCEDURE — 3288F FALL RISK ASSESSMENT DOCD: CPT | Mod: CPTII,S$GLB,, | Performed by: FAMILY MEDICINE

## 2022-06-09 PROCEDURE — 3079F PR MOST RECENT DIASTOLIC BLOOD PRESSURE 80-89 MM HG: ICD-10-PCS | Mod: CPTII,S$GLB,, | Performed by: FAMILY MEDICINE

## 2022-06-09 PROCEDURE — 3079F DIAST BP 80-89 MM HG: CPT | Mod: CPTII,S$GLB,, | Performed by: FAMILY MEDICINE

## 2022-06-09 PROCEDURE — 83036 HEMOGLOBIN GLYCOSYLATED A1C: CPT | Performed by: FAMILY MEDICINE

## 2022-06-09 PROCEDURE — 3074F PR MOST RECENT SYSTOLIC BLOOD PRESSURE < 130 MM HG: ICD-10-PCS | Mod: CPTII,S$GLB,, | Performed by: FAMILY MEDICINE

## 2022-06-09 PROCEDURE — 3008F PR BODY MASS INDEX (BMI) DOCUMENTED: ICD-10-PCS | Mod: CPTII,S$GLB,, | Performed by: FAMILY MEDICINE

## 2022-06-09 PROCEDURE — 99999 PR PBB SHADOW E&M-EST. PATIENT-LVL IV: CPT | Mod: PBBFAC,,, | Performed by: FAMILY MEDICINE

## 2022-06-09 PROCEDURE — 1101F PR PT FALLS ASSESS DOC 0-1 FALLS W/OUT INJ PAST YR: ICD-10-PCS | Mod: CPTII,S$GLB,, | Performed by: FAMILY MEDICINE

## 2022-06-09 PROCEDURE — 4010F PR ACE/ARB THEARPY RXD/TAKEN: ICD-10-PCS | Mod: CPTII,S$GLB,, | Performed by: FAMILY MEDICINE

## 2022-06-09 PROCEDURE — 3072F PR LOW RISK FOR RETINOPATHY: ICD-10-PCS | Mod: CPTII,S$GLB,, | Performed by: FAMILY MEDICINE

## 2022-06-09 RX ORDER — LORAZEPAM 0.5 MG/1
1 TABLET ORAL 2 TIMES DAILY
Qty: 180 TABLET | Refills: 1 | Status: SHIPPED | OUTPATIENT
Start: 2022-06-09 | End: 2022-12-15

## 2022-06-09 NOTE — H&P (VIEW-ONLY)
Subjective:       Patient ID: Cassandra Campos is a 71 y.o. female.    Chief Complaint: Annual Exam    Follow-up hypertension hyperlipidemia diabetes.  She is followed by hepatology regards elevated liver enzymes.  She denies headache chest pain palpitations shortness of breath or edema.  Pravastatin was held due to elevated liver functions.  Recent total cholesterol of 204 with an LDL of 129. She has increased anxiety dealing with increased liver functions.  She would like to increased dose of lorazepam 0.5 mg twice a day.      Review of Systems   Constitutional: Negative for appetite change, fatigue and unexpected weight change.   Respiratory: Negative for cough, chest tightness, shortness of breath and wheezing.    Cardiovascular: Negative for chest pain, palpitations and leg swelling.   Gastrointestinal: Negative for abdominal distention, abdominal pain, nausea and vomiting.   Endocrine: Negative for polydipsia and polyuria.   Genitourinary: Negative for difficulty urinating.   Neurological: Negative for dizziness, weakness, light-headedness and headaches.   Psychiatric/Behavioral: The patient is nervous/anxious.        Objective:      Physical Exam  Constitutional:       General: She is not in acute distress.     Appearance: She is not ill-appearing or diaphoretic.   Cardiovascular:      Rate and Rhythm: Normal rate and regular rhythm.      Heart sounds: No murmur heard.    No gallop.   Pulmonary:      Effort: Pulmonary effort is normal. No respiratory distress.      Breath sounds: No wheezing, rhonchi or rales.   Abdominal:      General: There is no distension.      Palpations: Abdomen is soft.   Lymphadenopathy:      Cervical: No cervical adenopathy.   Skin:     General: Skin is warm and dry.      Coloration: Skin is not pale.      Findings: No erythema.   Neurological:      Mental Status: She is alert and oriented to person, place, and time.   Psychiatric:         Behavior: Behavior normal.          Judgment: Judgment normal.         Lab Visit on 04/27/2022   Component Date Value Ref Range Status    IgG 04/27/2022 2133 (A) 650 - 1600 mg/dL Final    IgA 04/27/2022 418 (A) 40 - 350 mg/dL Final    IgM 04/27/2022 88  50 - 300 mg/dL Final    Smooth Muscle Ab 04/27/2022 Negative 1:40  Negative Final    Anti-Liver/Kidney Microsome Ab 04/27/2022 1.8  <=20 UNITS Final    DEBORAH Screen 04/27/2022 Positive (A) Negative <1:80 Final    Anti-Mitochon Ab IFA 04/27/2022 Negative 1:40  Negative Final    Ferritin 04/27/2022 36  20.0 - 300.0 ng/mL Final    DEBORAH PATTERN 1 04/27/2022 Speckled   Final    Anti Sm Antibody 04/27/2022 0.10  0.00 - 0.99 Ratio Final    Anti-Sm Interpretation 04/27/2022 Negative  Negative Final    Anti-SSA Antibody 04/27/2022 4.47 (A) 0.00 - 0.99 Ratio Final    Anti-SSA Interpretation 04/27/2022 Positive (A) Negative Final    Anti-SSB Antibody 04/27/2022 0.15  0.00 - 0.99 Ratio Final    Anti-SSB Interpretation 04/27/2022 Negative  Negative Final    ds DNA Ab 04/27/2022 Negative 1:10  Negative 1:10 Final    Anti Sm/RNP Antibody 04/27/2022 0.15  0.00 - 0.99 Ratio Final    Anti-Sm/RNP Interpretation 04/27/2022 Negative  Negative Final    DEOBRAH Titer 1 04/27/2022 1:1280   Final     Assessment:       1. Controlled type 2 diabetes mellitus without complication, without long-term current use of insulin    2. Panic attack as reaction to stress    3. Elevated liver enzymes    4. Primary hypertension    5. Hyperlipidemia associated with type 2 diabetes mellitus        Plan:   Blood pressure controlled glucose A1c ordered medications reviewed increase lorazepam start 1 mg twice a day follow-up in 3 months      Controlled type 2 diabetes mellitus without complication, without long-term current use of insulin  -     Hemoglobin A1C; Future; Expected date: 06/09/2022  -     Glucose, Fasting; Future; Expected date: 06/09/2022    Panic attack as reaction to stress  -     LORazepam (ATIVAN) 0.5 MG tablet; Take  2 tablets (1 mg total) by mouth 2 (two) times daily.  Dispense: 180 tablet; Refill: 1    Elevated liver enzymes    Primary hypertension    Hyperlipidemia associated with type 2 diabetes mellitus

## 2022-06-09 NOTE — PROGRESS NOTES
Subjective:       Patient ID: Cassandra Campos is a 71 y.o. female.    Chief Complaint: Annual Exam    Follow-up hypertension hyperlipidemia diabetes.  She is followed by hepatology regards elevated liver enzymes.  She denies headache chest pain palpitations shortness of breath or edema.  Pravastatin was held due to elevated liver functions.  Recent total cholesterol of 204 with an LDL of 129. She has increased anxiety dealing with increased liver functions.  She would like to increased dose of lorazepam 0.5 mg twice a day.      Review of Systems   Constitutional: Negative for appetite change, fatigue and unexpected weight change.   Respiratory: Negative for cough, chest tightness, shortness of breath and wheezing.    Cardiovascular: Negative for chest pain, palpitations and leg swelling.   Gastrointestinal: Negative for abdominal distention, abdominal pain, nausea and vomiting.   Endocrine: Negative for polydipsia and polyuria.   Genitourinary: Negative for difficulty urinating.   Neurological: Negative for dizziness, weakness, light-headedness and headaches.   Psychiatric/Behavioral: The patient is nervous/anxious.        Objective:      Physical Exam  Constitutional:       General: She is not in acute distress.     Appearance: She is not ill-appearing or diaphoretic.   Cardiovascular:      Rate and Rhythm: Normal rate and regular rhythm.      Heart sounds: No murmur heard.    No gallop.   Pulmonary:      Effort: Pulmonary effort is normal. No respiratory distress.      Breath sounds: No wheezing, rhonchi or rales.   Abdominal:      General: There is no distension.      Palpations: Abdomen is soft.   Lymphadenopathy:      Cervical: No cervical adenopathy.   Skin:     General: Skin is warm and dry.      Coloration: Skin is not pale.      Findings: No erythema.   Neurological:      Mental Status: She is alert and oriented to person, place, and time.   Psychiatric:         Behavior: Behavior normal.          Judgment: Judgment normal.         Lab Visit on 04/27/2022   Component Date Value Ref Range Status    IgG 04/27/2022 2133 (A) 650 - 1600 mg/dL Final    IgA 04/27/2022 418 (A) 40 - 350 mg/dL Final    IgM 04/27/2022 88  50 - 300 mg/dL Final    Smooth Muscle Ab 04/27/2022 Negative 1:40  Negative Final    Anti-Liver/Kidney Microsome Ab 04/27/2022 1.8  <=20 UNITS Final    DEBORAH Screen 04/27/2022 Positive (A) Negative <1:80 Final    Anti-Mitochon Ab IFA 04/27/2022 Negative 1:40  Negative Final    Ferritin 04/27/2022 36  20.0 - 300.0 ng/mL Final    DEBORAH PATTERN 1 04/27/2022 Speckled   Final    Anti Sm Antibody 04/27/2022 0.10  0.00 - 0.99 Ratio Final    Anti-Sm Interpretation 04/27/2022 Negative  Negative Final    Anti-SSA Antibody 04/27/2022 4.47 (A) 0.00 - 0.99 Ratio Final    Anti-SSA Interpretation 04/27/2022 Positive (A) Negative Final    Anti-SSB Antibody 04/27/2022 0.15  0.00 - 0.99 Ratio Final    Anti-SSB Interpretation 04/27/2022 Negative  Negative Final    ds DNA Ab 04/27/2022 Negative 1:10  Negative 1:10 Final    Anti Sm/RNP Antibody 04/27/2022 0.15  0.00 - 0.99 Ratio Final    Anti-Sm/RNP Interpretation 04/27/2022 Negative  Negative Final    DEBORAH Titer 1 04/27/2022 1:1280   Final     Assessment:       1. Controlled type 2 diabetes mellitus without complication, without long-term current use of insulin    2. Panic attack as reaction to stress    3. Elevated liver enzymes    4. Primary hypertension    5. Hyperlipidemia associated with type 2 diabetes mellitus        Plan:   Blood pressure controlled glucose A1c ordered medications reviewed increase lorazepam start 1 mg twice a day follow-up in 3 months      Controlled type 2 diabetes mellitus without complication, without long-term current use of insulin  -     Hemoglobin A1C; Future; Expected date: 06/09/2022  -     Glucose, Fasting; Future; Expected date: 06/09/2022    Panic attack as reaction to stress  -     LORazepam (ATIVAN) 0.5 MG tablet; Take  2 tablets (1 mg total) by mouth 2 (two) times daily.  Dispense: 180 tablet; Refill: 1    Elevated liver enzymes    Primary hypertension    Hyperlipidemia associated with type 2 diabetes mellitus

## 2022-06-09 NOTE — TELEPHONE ENCOUNTER
----- Message from Carol Alejandro sent at 6/9/2022 12:58 PM CDT -----  Contact: Joseph([facility])  Ty called to consult with nurse or staff regarding a prescription dosage change  for the lorazepam. She would like a call back and can be reached at [phone] dial [phone], then 2 again and ask to speak with pharmacy staff. Thanks/MR

## 2022-06-20 ENCOUNTER — TELEPHONE (OUTPATIENT)
Dept: RADIOLOGY | Facility: HOSPITAL | Age: 72
End: 2022-06-20
Payer: MEDICARE

## 2022-06-20 DIAGNOSIS — R74.8 ELEVATED LIVER ENZYMES: Primary | ICD-10-CM

## 2022-06-20 DIAGNOSIS — D68.9 COAGULATION DEFECT: ICD-10-CM

## 2022-06-20 NOTE — TELEPHONE ENCOUNTER
Called pt to confirm radiology procedure appointment on 6/21/22 at 10:30. Advised pt to arrive to [facility] on [address] at 09:30 and be NPO after midnight tonight. Ok to take BP meds in AM with small sip of water. Pt verbalized understanding and all questions answered. Pt confirmed she is not on any blood thinners and will have a ride home post-procedure.

## 2022-06-21 ENCOUNTER — HOSPITAL ENCOUNTER (OUTPATIENT)
Dept: RADIOLOGY | Facility: HOSPITAL | Age: 72
Discharge: HOME | End: 2022-06-21
Attending: INTERNAL MEDICINE
Payer: MEDICARE

## 2022-06-21 VITALS
HEIGHT: 63 IN | OXYGEN SATURATION: 98 % | DIASTOLIC BLOOD PRESSURE: 62 MMHG | HEART RATE: 71 BPM | BODY MASS INDEX: 35.97 KG/M2 | RESPIRATION RATE: 18 BRPM | WEIGHT: 203 LBS | SYSTOLIC BLOOD PRESSURE: 118 MMHG

## 2022-06-21 DIAGNOSIS — R74.8 ELEVATED LIVER ENZYMES: ICD-10-CM

## 2022-06-21 PROCEDURE — 63600175 PHARM REV CODE 636 W HCPCS: Performed by: STUDENT IN AN ORGANIZED HEALTH CARE EDUCATION/TRAINING PROGRAM

## 2022-06-21 PROCEDURE — 77012 CT SCAN FOR NEEDLE BIOPSY: CPT | Mod: TC

## 2022-06-21 PROCEDURE — 88313 SPECIAL STAINS GROUP 2: CPT | Mod: 26,,, | Performed by: PATHOLOGY

## 2022-06-21 PROCEDURE — 88307 TISSUE EXAM BY PATHOLOGIST: CPT | Performed by: PATHOLOGY

## 2022-06-21 PROCEDURE — 88307 TISSUE EXAM BY PATHOLOGIST: CPT | Mod: 26,,, | Performed by: PATHOLOGY

## 2022-06-21 PROCEDURE — 88313 SPECIAL STAINS GROUP 2: CPT | Mod: 59 | Performed by: PATHOLOGY

## 2022-06-21 PROCEDURE — 88307 PR  SURG PATH,LEVEL V: ICD-10-PCS | Mod: 26,,, | Performed by: PATHOLOGY

## 2022-06-21 PROCEDURE — 88313 PR  SPECIAL STAINS,GROUP II: ICD-10-PCS | Mod: 26,,, | Performed by: PATHOLOGY

## 2022-06-21 RX ORDER — FENTANYL CITRATE 50 UG/ML
INJECTION, SOLUTION INTRAMUSCULAR; INTRAVENOUS CODE/TRAUMA/SEDATION MEDICATION
Status: COMPLETED | OUTPATIENT
Start: 2022-06-21 | End: 2022-06-21

## 2022-06-21 RX ORDER — MIDAZOLAM HYDROCHLORIDE 1 MG/ML
INJECTION INTRAMUSCULAR; INTRAVENOUS CODE/TRAUMA/SEDATION MEDICATION
Status: COMPLETED | OUTPATIENT
Start: 2022-06-21 | End: 2022-06-21

## 2022-06-21 RX ADMIN — MIDAZOLAM HYDROCHLORIDE 1 MG: 1 INJECTION INTRAMUSCULAR; INTRAVENOUS at 10:06

## 2022-06-21 RX ADMIN — FENTANYL CITRATE 25 MCG: 50 INJECTION, SOLUTION INTRAMUSCULAR; INTRAVENOUS at 10:06

## 2022-06-21 NOTE — SEDATION DOCUMENTATION
While moving patient to her bed, IV tubing was caught in the blankets and the IV was accidentally pulled.  Cath intact, no bleeding noted

## 2022-06-21 NOTE — PLAN OF CARE
Patient received from procedure area awake and alert. Bandaid noted to right side C/D/I. Report received from BEAU More. Will continue to monitor.

## 2022-06-23 ENCOUNTER — PATIENT MESSAGE (OUTPATIENT)
Dept: HEPATOLOGY | Facility: CLINIC | Age: 72
End: 2022-06-23
Payer: MEDICARE

## 2022-06-23 LAB
FINAL PATHOLOGIC DIAGNOSIS: NORMAL
GROSS: NORMAL
Lab: NORMAL

## 2022-06-29 ENCOUNTER — OFFICE VISIT (OUTPATIENT)
Dept: HEPATOLOGY | Facility: CLINIC | Age: 72
End: 2022-06-29
Payer: MEDICARE

## 2022-06-29 VITALS
WEIGHT: 205.38 LBS | SYSTOLIC BLOOD PRESSURE: 120 MMHG | BODY MASS INDEX: 36.39 KG/M2 | HEIGHT: 63 IN | DIASTOLIC BLOOD PRESSURE: 78 MMHG | HEART RATE: 85 BPM

## 2022-06-29 DIAGNOSIS — K75.81 NASH (NONALCOHOLIC STEATOHEPATITIS): Primary | ICD-10-CM

## 2022-06-29 DIAGNOSIS — E66.9 OBESITY (BMI 35.0-39.9 WITHOUT COMORBIDITY): ICD-10-CM

## 2022-06-29 PROCEDURE — 99213 OFFICE O/P EST LOW 20 MIN: CPT | Mod: S$GLB,,, | Performed by: INTERNAL MEDICINE

## 2022-06-29 PROCEDURE — 3288F FALL RISK ASSESSMENT DOCD: CPT | Mod: CPTII,S$GLB,, | Performed by: INTERNAL MEDICINE

## 2022-06-29 PROCEDURE — 3072F LOW RISK FOR RETINOPATHY: CPT | Mod: CPTII,S$GLB,, | Performed by: INTERNAL MEDICINE

## 2022-06-29 PROCEDURE — 3051F PR MOST RECENT HEMOGLOBIN A1C LEVEL 7.0 - < 8.0%: ICD-10-PCS | Mod: CPTII,S$GLB,, | Performed by: INTERNAL MEDICINE

## 2022-06-29 PROCEDURE — 3008F BODY MASS INDEX DOCD: CPT | Mod: CPTII,S$GLB,, | Performed by: INTERNAL MEDICINE

## 2022-06-29 PROCEDURE — 1126F PR PAIN SEVERITY QUANTIFIED, NO PAIN PRESENT: ICD-10-PCS | Mod: CPTII,S$GLB,, | Performed by: INTERNAL MEDICINE

## 2022-06-29 PROCEDURE — 99499 RISK ADDL DX/OHS AUDIT: ICD-10-PCS | Mod: S$GLB,,, | Performed by: INTERNAL MEDICINE

## 2022-06-29 PROCEDURE — 3078F PR MOST RECENT DIASTOLIC BLOOD PRESSURE < 80 MM HG: ICD-10-PCS | Mod: CPTII,S$GLB,, | Performed by: INTERNAL MEDICINE

## 2022-06-29 PROCEDURE — 1126F AMNT PAIN NOTED NONE PRSNT: CPT | Mod: CPTII,S$GLB,, | Performed by: INTERNAL MEDICINE

## 2022-06-29 PROCEDURE — 1101F PR PT FALLS ASSESS DOC 0-1 FALLS W/OUT INJ PAST YR: ICD-10-PCS | Mod: CPTII,S$GLB,, | Performed by: INTERNAL MEDICINE

## 2022-06-29 PROCEDURE — 4010F PR ACE/ARB THEARPY RXD/TAKEN: ICD-10-PCS | Mod: CPTII,S$GLB,, | Performed by: INTERNAL MEDICINE

## 2022-06-29 PROCEDURE — 1101F PT FALLS ASSESS-DOCD LE1/YR: CPT | Mod: CPTII,S$GLB,, | Performed by: INTERNAL MEDICINE

## 2022-06-29 PROCEDURE — 3078F DIAST BP <80 MM HG: CPT | Mod: CPTII,S$GLB,, | Performed by: INTERNAL MEDICINE

## 2022-06-29 PROCEDURE — 99213 PR OFFICE/OUTPT VISIT, EST, LEVL III, 20-29 MIN: ICD-10-PCS | Mod: S$GLB,,, | Performed by: INTERNAL MEDICINE

## 2022-06-29 PROCEDURE — 3288F PR FALLS RISK ASSESSMENT DOCUMENTED: ICD-10-PCS | Mod: CPTII,S$GLB,, | Performed by: INTERNAL MEDICINE

## 2022-06-29 PROCEDURE — 4010F ACE/ARB THERAPY RXD/TAKEN: CPT | Mod: CPTII,S$GLB,, | Performed by: INTERNAL MEDICINE

## 2022-06-29 PROCEDURE — 3074F SYST BP LT 130 MM HG: CPT | Mod: CPTII,S$GLB,, | Performed by: INTERNAL MEDICINE

## 2022-06-29 PROCEDURE — 1159F PR MEDICATION LIST DOCUMENTED IN MEDICAL RECORD: ICD-10-PCS | Mod: CPTII,S$GLB,, | Performed by: INTERNAL MEDICINE

## 2022-06-29 PROCEDURE — 3072F PR LOW RISK FOR RETINOPATHY: ICD-10-PCS | Mod: CPTII,S$GLB,, | Performed by: INTERNAL MEDICINE

## 2022-06-29 PROCEDURE — 3051F HG A1C>EQUAL 7.0%<8.0%: CPT | Mod: CPTII,S$GLB,, | Performed by: INTERNAL MEDICINE

## 2022-06-29 PROCEDURE — 99999 PR PBB SHADOW E&M-EST. PATIENT-LVL IV: CPT | Mod: PBBFAC,,, | Performed by: INTERNAL MEDICINE

## 2022-06-29 PROCEDURE — 1159F MED LIST DOCD IN RCRD: CPT | Mod: CPTII,S$GLB,, | Performed by: INTERNAL MEDICINE

## 2022-06-29 PROCEDURE — 99999 PR PBB SHADOW E&M-EST. PATIENT-LVL IV: ICD-10-PCS | Mod: PBBFAC,,, | Performed by: INTERNAL MEDICINE

## 2022-06-29 PROCEDURE — 99499 UNLISTED E&M SERVICE: CPT | Mod: S$GLB,,, | Performed by: INTERNAL MEDICINE

## 2022-06-29 PROCEDURE — 3008F PR BODY MASS INDEX (BMI) DOCUMENTED: ICD-10-PCS | Mod: CPTII,S$GLB,, | Performed by: INTERNAL MEDICINE

## 2022-06-29 PROCEDURE — 3074F PR MOST RECENT SYSTOLIC BLOOD PRESSURE < 130 MM HG: ICD-10-PCS | Mod: CPTII,S$GLB,, | Performed by: INTERNAL MEDICINE

## 2022-06-29 NOTE — PROGRESS NOTES
Subjective:     Cassandra Campos is here for evaluation of abnormal LFTs    History of Present Illness:  Cassandra Campos is here for eval of abnormal LFTs, she had persistent elevation of liver enzymes since 2019, prior to that she had fluctuating levels of for elevated transaminases, normal total bilirubin and the noted only 1 time elevation of alkaline phosphatase.  Mainly ALT is elevated, but AST and ALT are less than twice the upper limit of normal.  denies liver related complaints    Duration of abnormality- since 2020  Medications/OTC/Herbal- denies   ETOH- occasional  Metabolic issues- HTN, DM< Hyperlipidemia   BMI-36  Family Hx-none    Interval history 06/08/2022;  Since her last visit 5 weeks ago, she lost 5 lb.  She changed her dietary habits to low-calorie, low-carbohydrate, high-protein diet, also started exercising.  She is motivated to lose 15 more lbs.  Denies liver related complaints, no hospitalizations, no new medication.    Interval history: 6/29/22  Came to discuss biopsy findings. No liver related complaints since last visit     LIVER, BIOPSY: 6/21/22  Macrovesicular steatosis - 15%   Few ballooned hepatocytes   Mild non-specific portal chronic inflammation   Glycogenated nuclei present   Increased pericellular and portal fibrosis (stage 1-2 of 4)   Negative for iron deposits   Causes of steatohepatitis are numerous and include drug/toxin (including   alcohol), metabolic conditions (including nonalcoholic fatty liver disease),   infections and others. Please correlate clinically.   [facility] [employer] SCORING SYSTEM   Steatosis grade - 1   Lobular inflammation - 0   Hepatocellular ballooning - 1   Fibrosis - 1/2     Review of Systems   Constitutional: Negative for fatigue and fever.   Gastrointestinal: Negative for abdominal pain, blood in stool, nausea and vomiting.   Musculoskeletal: Positive for arthralgias and back pain.   Hematological: Does not bruise/bleed easily.    Psychiatric/Behavioral: Negative for confusion.       Objective:     Physical Exam  Vitals reviewed.   Constitutional:       Appearance: Normal appearance. She is obese.   Eyes:      General: No scleral icterus.  Abdominal:      General: Bowel sounds are normal. There is no distension.      Palpations: There is no mass.      Tenderness: There is no abdominal tenderness.   Musculoskeletal:      Right lower leg: No edema.      Left lower leg: No edema.   Skin:     General: Skin is warm and dry.      Coloration: Skin is not jaundiced.   Neurological:      Mental Status: She is alert and oriented to person, place, and time.   Psychiatric:         Thought Content: Thought content normal.         MELD-Na score: 6 at 6/21/2022  9:35 AM  MELD score: 6 at 6/21/2022  9:35 AM  Calculated from:  Serum Creatinine: 0.7 mg/dL (Using min of 1 mg/dL) at 6/21/2022  9:35 AM  Serum Sodium: 138 mmol/L (Using max of 137 mmol/L) at 6/21/2022  9:35 AM  Total Bilirubin: 0.3 mg/dL (Using min of 1 mg/dL) at 6/21/2022  9:35 AM  INR(ratio): 1.0 at 6/21/2022  9:35 AM  Age: 71 years    WBC   Date Value Ref Range Status   06/21/2022 7.65 3.90 - 12.70 K/uL Final     Hemoglobin   Date Value Ref Range Status   06/21/2022 13.2 12.0 - 16.0 g/dL Final     Hematocrit   Date Value Ref Range Status   06/21/2022 40.2 37.0 - 48.5 % Final     Platelets   Date Value Ref Range Status   06/21/2022 244 150 - 450 K/uL Final     BUN   Date Value Ref Range Status   06/21/2022 16 8 - 23 mg/dL Final     Creatinine   Date Value Ref Range Status   06/21/2022 0.7 0.5 - 1.4 mg/dL Final     Glucose   Date Value Ref Range Status   06/21/2022 131 (H) 70 - 110 mg/dL Final     Calcium   Date Value Ref Range Status   06/21/2022 9.5 8.7 - 10.5 mg/dL Final     Sodium   Date Value Ref Range Status   06/21/2022 138 136 - 145 mmol/L Final     Potassium   Date Value Ref Range Status   06/21/2022 4.2 3.5 - 5.1 mmol/L Final     Chloride   Date Value Ref Range Status   06/21/2022 104  95 - 110 mmol/L Final     Magnesium   Date Value Ref Range Status   02/14/2017 1.6 1.6 - 2.6 mg/dL Final     AST   Date Value Ref Range Status   06/21/2022 38 10 - 40 U/L Final     ALT   Date Value Ref Range Status   06/21/2022 57 (H) 10 - 44 U/L Final     Alkaline Phosphatase   Date Value Ref Range Status   06/21/2022 103 55 - 135 U/L Final     Total Bilirubin   Date Value Ref Range Status   06/21/2022 0.3 0.1 - 1.0 mg/dL Final     Comment:     For infants and newborns, interpretation of results should be based  on gestational age, weight and in agreement with clinical  observations.    Premature Infant recommended reference ranges:  Up to 24 hours.............<8.0 mg/dL  Up to 48 hours............<12.0 mg/dL  3-5 days..................<15.0 mg/dL  6-29 days.................<15.0 mg/dL       Albumin   Date Value Ref Range Status   06/21/2022 3.7 3.5 - 5.2 g/dL Final     INR   Date Value Ref Range Status   06/21/2022 1.0 0.8 - 1.2 Final     Comment:     Coumadin Therapy:  2.0 - 3.0 for INR for all indicators except mechanical heart valves  and antiphospholipid syndromes which should use 2.5 - 3.5.           Assessment/Plan:     1.Abnormal LFTs:  Work up showed positive DEBORAH  1:1280, IgG 2133, IgA 418  Suspect she may have underlying fatty liver disease, however underlying autoimmune liver disease needed to be ruled out. S/p liver biopsy shows macrovesicular steatosis 15%, ballooning hepatocytes,steatohepatitis, no lobular inflammation or bile duct injury. F2 fibrosis   VCTE showed S3 steatosis and F3 fibrosis.    2.Obesity-class -1  Discussed dietary recommendations- Low calorie, low carbohydrate, high protein diet with goal of loosing >3-5% to improve steatosis and >7-10% to improve histological changes if present    3.Metabolic syndrome:   Reduction of risk factors for CVD  Continued treatment for HTN  Optimization of blood glucose control.  Continue lipid-lowering therapy  Increase physical activity     RTC; 3  months     I have reviewed existing labs, imaging, procedures. Educated patient about disease process, prognosis. Ordered required labs, images and discussed treatment plan.     Loretta Roque MD  Transplant Hepatologist  Dept of Hepatology, [place] [facility]

## 2022-08-17 ENCOUNTER — PATIENT MESSAGE (OUTPATIENT)
Dept: HEPATOLOGY | Facility: CLINIC | Age: 72
End: 2022-08-17
Payer: MEDICARE

## 2022-08-24 ENCOUNTER — PATIENT OUTREACH (OUTPATIENT)
Dept: ADMINISTRATIVE | Facility: HOSPITAL | Age: 72
End: 2022-08-24
Payer: MEDICARE

## 2022-08-24 NOTE — PROGRESS NOTES
Working [employer] Statin Report.    Per med card pt had been on pravastatin 40 mg.  Notes indicate med stopped due to elevated LFT's.    Mess sent to pcp to update problem list as appropriate and drop code.   A list of a few of the codes to be used were sent.    Pt has appt scheduled, 9/12/22.

## 2022-08-24 NOTE — Clinical Note
[employer] is triggering for a statin due to DM. April note indicates statin stopped due to elevated lft's.  If patient cannot take statin medications due to allergy or intolerance the PCP must drop that code every year in a visit.  A few codes to place on Problem list instead of insensitivity/intolerance/allergy:  G72.0 Drug induced myopathy G72.2 Myopathy due to other toxic agent G72.9 Myopathy, unspecified M62.82 Rhabdomyolysis M60.80 Other myositis unspecified M79.1 Myalgia Elevated LFT (enzymes)  T.46.6 adverse reaction to statin (Elevated LFT's) K71.9 Hepatotoxicity due to statin (Elevated LFT's)

## 2022-09-12 ENCOUNTER — LAB VISIT (OUTPATIENT)
Dept: LAB | Facility: HOSPITAL | Age: 72
End: 2022-09-12
Attending: INTERNAL MEDICINE
Payer: MEDICARE

## 2022-09-12 ENCOUNTER — OFFICE VISIT (OUTPATIENT)
Dept: INTERNAL MEDICINE | Facility: CLINIC | Age: 72
End: 2022-09-12
Payer: MEDICARE

## 2022-09-12 VITALS
DIASTOLIC BLOOD PRESSURE: 74 MMHG | BODY MASS INDEX: 34.84 KG/M2 | WEIGHT: 196.63 LBS | SYSTOLIC BLOOD PRESSURE: 116 MMHG | HEIGHT: 63 IN | OXYGEN SATURATION: 95 % | RESPIRATION RATE: 18 BRPM | HEART RATE: 95 BPM

## 2022-09-12 DIAGNOSIS — E78.5 HYPERLIPIDEMIA ASSOCIATED WITH TYPE 2 DIABETES MELLITUS: ICD-10-CM

## 2022-09-12 DIAGNOSIS — E11.9 CONTROLLED TYPE 2 DIABETES MELLITUS WITHOUT COMPLICATION, WITHOUT LONG-TERM CURRENT USE OF INSULIN: ICD-10-CM

## 2022-09-12 DIAGNOSIS — I10 PRIMARY HYPERTENSION: Primary | ICD-10-CM

## 2022-09-12 DIAGNOSIS — R74.8 ELEVATED LIVER ENZYMES: ICD-10-CM

## 2022-09-12 DIAGNOSIS — I10 PRIMARY HYPERTENSION: ICD-10-CM

## 2022-09-12 DIAGNOSIS — F41.0 PANIC ATTACK AS REACTION TO STRESS: ICD-10-CM

## 2022-09-12 DIAGNOSIS — I47.10 PAROXYSMAL SVT (SUPRAVENTRICULAR TACHYCARDIA): ICD-10-CM

## 2022-09-12 DIAGNOSIS — E61.1 IRON DEFICIENCY: ICD-10-CM

## 2022-09-12 DIAGNOSIS — J84.10 CALCIFIED GRANULOMA OF LUNG: ICD-10-CM

## 2022-09-12 DIAGNOSIS — F43.0 PANIC ATTACK AS REACTION TO STRESS: ICD-10-CM

## 2022-09-12 DIAGNOSIS — E11.69 HYPERLIPIDEMIA ASSOCIATED WITH TYPE 2 DIABETES MELLITUS: ICD-10-CM

## 2022-09-12 LAB
ALBUMIN SERPL BCP-MCNC: 3.8 G/DL (ref 3.5–5.2)
ALP SERPL-CCNC: 96 U/L (ref 55–135)
ALT SERPL W/O P-5'-P-CCNC: 42 U/L (ref 10–44)
ANION GAP SERPL CALC-SCNC: 11 MMOL/L (ref 8–16)
AST SERPL-CCNC: 33 U/L (ref 10–40)
BILIRUB SERPL-MCNC: 0.3 MG/DL (ref 0.1–1)
BUN SERPL-MCNC: 15 MG/DL (ref 8–23)
CALCIUM SERPL-MCNC: 10.1 MG/DL (ref 8.7–10.5)
CHLORIDE SERPL-SCNC: 102 MMOL/L (ref 95–110)
CHOLEST SERPL-MCNC: 190 MG/DL (ref 120–199)
CHOLEST/HDLC SERPL: 4.9 {RATIO} (ref 2–5)
CO2 SERPL-SCNC: 24 MMOL/L (ref 23–29)
CREAT SERPL-MCNC: 0.8 MG/DL (ref 0.5–1.4)
EST. GFR  (NO RACE VARIABLE): >60 ML/MIN/1.73 M^2
ESTIMATED AVG GLUCOSE: 126 MG/DL (ref 68–131)
FERRITIN SERPL-MCNC: 35 NG/ML (ref 20–300)
GLUCOSE SERPL-MCNC: 88 MG/DL (ref 70–110)
GLUCOSE SERPL-MCNC: 88 MG/DL (ref 70–110)
HBA1C MFR BLD: 6 % (ref 4–5.6)
HDLC SERPL-MCNC: 39 MG/DL (ref 40–75)
HDLC SERPL: 20.5 % (ref 20–50)
IRON SERPL-MCNC: 112 UG/DL (ref 30–160)
LDLC SERPL CALC-MCNC: 114.6 MG/DL (ref 63–159)
NONHDLC SERPL-MCNC: 151 MG/DL
POTASSIUM SERPL-SCNC: 4.2 MMOL/L (ref 3.5–5.1)
PROT SERPL-MCNC: 8.8 G/DL (ref 6–8.4)
SODIUM SERPL-SCNC: 137 MMOL/L (ref 136–145)
TRIGL SERPL-MCNC: 182 MG/DL (ref 30–150)
TSH SERPL DL<=0.005 MIU/L-ACNC: 3.22 UIU/ML (ref 0.4–4)

## 2022-09-12 PROCEDURE — 3008F PR BODY MASS INDEX (BMI) DOCUMENTED: ICD-10-PCS | Mod: CPTII,S$GLB,, | Performed by: FAMILY MEDICINE

## 2022-09-12 PROCEDURE — 4010F ACE/ARB THERAPY RXD/TAKEN: CPT | Mod: CPTII,S$GLB,, | Performed by: FAMILY MEDICINE

## 2022-09-12 PROCEDURE — 1159F MED LIST DOCD IN RCRD: CPT | Mod: CPTII,S$GLB,, | Performed by: FAMILY MEDICINE

## 2022-09-12 PROCEDURE — 1101F PT FALLS ASSESS-DOCD LE1/YR: CPT | Mod: CPTII,S$GLB,, | Performed by: FAMILY MEDICINE

## 2022-09-12 PROCEDURE — 84443 ASSAY THYROID STIM HORMONE: CPT | Performed by: FAMILY MEDICINE

## 2022-09-12 PROCEDURE — 3074F SYST BP LT 130 MM HG: CPT | Mod: CPTII,S$GLB,, | Performed by: FAMILY MEDICINE

## 2022-09-12 PROCEDURE — 80061 LIPID PANEL: CPT | Performed by: FAMILY MEDICINE

## 2022-09-12 PROCEDURE — 83540 ASSAY OF IRON: CPT | Performed by: FAMILY MEDICINE

## 2022-09-12 PROCEDURE — 1126F PR PAIN SEVERITY QUANTIFIED, NO PAIN PRESENT: ICD-10-PCS | Mod: CPTII,S$GLB,, | Performed by: FAMILY MEDICINE

## 2022-09-12 PROCEDURE — 1101F PR PT FALLS ASSESS DOC 0-1 FALLS W/OUT INJ PAST YR: ICD-10-PCS | Mod: CPTII,S$GLB,, | Performed by: FAMILY MEDICINE

## 2022-09-12 PROCEDURE — 3072F PR LOW RISK FOR RETINOPATHY: ICD-10-PCS | Mod: CPTII,S$GLB,, | Performed by: FAMILY MEDICINE

## 2022-09-12 PROCEDURE — 82728 ASSAY OF FERRITIN: CPT | Performed by: FAMILY MEDICINE

## 2022-09-12 PROCEDURE — 3008F BODY MASS INDEX DOCD: CPT | Mod: CPTII,S$GLB,, | Performed by: FAMILY MEDICINE

## 2022-09-12 PROCEDURE — 99999 PR PBB SHADOW E&M-EST. PATIENT-LVL IV: ICD-10-PCS | Mod: PBBFAC,,, | Performed by: FAMILY MEDICINE

## 2022-09-12 PROCEDURE — 3078F PR MOST RECENT DIASTOLIC BLOOD PRESSURE < 80 MM HG: ICD-10-PCS | Mod: CPTII,S$GLB,, | Performed by: FAMILY MEDICINE

## 2022-09-12 PROCEDURE — 3078F DIAST BP <80 MM HG: CPT | Mod: CPTII,S$GLB,, | Performed by: FAMILY MEDICINE

## 2022-09-12 PROCEDURE — 3072F LOW RISK FOR RETINOPATHY: CPT | Mod: CPTII,S$GLB,, | Performed by: FAMILY MEDICINE

## 2022-09-12 PROCEDURE — 80053 COMPREHEN METABOLIC PANEL: CPT | Performed by: INTERNAL MEDICINE

## 2022-09-12 PROCEDURE — 82947 ASSAY GLUCOSE BLOOD QUANT: CPT | Performed by: FAMILY MEDICINE

## 2022-09-12 PROCEDURE — 3074F PR MOST RECENT SYSTOLIC BLOOD PRESSURE < 130 MM HG: ICD-10-PCS | Mod: CPTII,S$GLB,, | Performed by: FAMILY MEDICINE

## 2022-09-12 PROCEDURE — 99999 PR PBB SHADOW E&M-EST. PATIENT-LVL IV: CPT | Mod: PBBFAC,,, | Performed by: FAMILY MEDICINE

## 2022-09-12 PROCEDURE — 36415 COLL VENOUS BLD VENIPUNCTURE: CPT | Performed by: INTERNAL MEDICINE

## 2022-09-12 PROCEDURE — 1159F PR MEDICATION LIST DOCUMENTED IN MEDICAL RECORD: ICD-10-PCS | Mod: CPTII,S$GLB,, | Performed by: FAMILY MEDICINE

## 2022-09-12 PROCEDURE — 99499 RISK ADDL DX/OHS AUDIT: ICD-10-PCS | Mod: S$GLB,,, | Performed by: FAMILY MEDICINE

## 2022-09-12 PROCEDURE — 99499 UNLISTED E&M SERVICE: CPT | Mod: S$GLB,,, | Performed by: FAMILY MEDICINE

## 2022-09-12 PROCEDURE — 3288F FALL RISK ASSESSMENT DOCD: CPT | Mod: CPTII,S$GLB,, | Performed by: FAMILY MEDICINE

## 2022-09-12 PROCEDURE — 3288F PR FALLS RISK ASSESSMENT DOCUMENTED: ICD-10-PCS | Mod: CPTII,S$GLB,, | Performed by: FAMILY MEDICINE

## 2022-09-12 PROCEDURE — 4010F PR ACE/ARB THEARPY RXD/TAKEN: ICD-10-PCS | Mod: CPTII,S$GLB,, | Performed by: FAMILY MEDICINE

## 2022-09-12 PROCEDURE — 1126F AMNT PAIN NOTED NONE PRSNT: CPT | Mod: CPTII,S$GLB,, | Performed by: FAMILY MEDICINE

## 2022-09-12 PROCEDURE — 3051F HG A1C>EQUAL 7.0%<8.0%: CPT | Mod: CPTII,S$GLB,, | Performed by: FAMILY MEDICINE

## 2022-09-12 PROCEDURE — 85025 COMPLETE CBC W/AUTO DIFF WBC: CPT | Performed by: FAMILY MEDICINE

## 2022-09-12 PROCEDURE — 99214 PR OFFICE/OUTPT VISIT, EST, LEVL IV, 30-39 MIN: ICD-10-PCS | Mod: S$GLB,,, | Performed by: FAMILY MEDICINE

## 2022-09-12 PROCEDURE — 99214 OFFICE O/P EST MOD 30 MIN: CPT | Mod: S$GLB,,, | Performed by: FAMILY MEDICINE

## 2022-09-12 PROCEDURE — 3051F PR MOST RECENT HEMOGLOBIN A1C LEVEL 7.0 - < 8.0%: ICD-10-PCS | Mod: CPTII,S$GLB,, | Performed by: FAMILY MEDICINE

## 2022-09-12 PROCEDURE — 83036 HEMOGLOBIN GLYCOSYLATED A1C: CPT | Performed by: FAMILY MEDICINE

## 2022-09-12 NOTE — PROGRESS NOTES
Subjective:       Patient ID: Cassandra Campos is a 72 y.o. female.    Chief Complaint: Annual Exam    Annual exam.  Follow-up hypertension hyperlipidemia diabetes paroxysmal SVT.  She is also being follow-up elevated liver enzymes.  She is due for diabetic eye exam soon.  She has requested our level being done will along with her routine lab.  She denies headache chest pain palpitations shortness of breath edema.  She denies polyuria polydipsia hypoglycemic symptoms.    Diabetes  She has type 2 diabetes mellitus. No MedicAlert identification noted. Hypoglycemia symptoms include nervousness/anxiousness. Pertinent negatives for hypoglycemia include no confusion, dizziness, headaches, hunger, mood changes, pallor, seizures, sleepiness, speech difficulty, sweats or tremors. Associated symptoms include weight loss. Pertinent negatives for diabetes include no blurred vision, no chest pain, no fatigue, no foot paresthesias, no foot ulcerations, no polydipsia, no polyphagia, no polyuria, no visual change and no weakness. Pertinent negatives for hypoglycemia complications include no blackouts, no hospitalization, no nocturnal hypoglycemia, no required assistance and no required glucagon injection. Symptoms are stable. Pertinent negatives for diabetic complications include no autonomic neuropathy, CVA, heart disease, nephropathy, peripheral neuropathy, PVD or retinopathy. Risk factors for coronary artery disease include dyslipidemia, hypertension, obesity, stress and diabetes mellitus. Current diabetic treatment includes diet and oral agent (monotherapy). She is compliant with treatment most of the time. Her weight is fluctuating minimally. She is following a diabetic and generally healthy diet. Meal planning includes carbohydrate counting. She has not had a previous visit with a dietitian. She participates in exercise three times a week. She monitors blood glucose at home 1-2 x per day. She monitors urine at home <1 x per  month. Blood glucose monitoring compliance is excellent. Her home blood glucose trend is fluctuating dramatically. She sees a podiatrist.Eye exam is current.   Review of Systems   Constitutional:  Positive for weight loss. Negative for activity change, appetite change, chills, fatigue, fever and unexpected weight change.   HENT:  Negative for congestion.    Eyes:  Negative for blurred vision.   Respiratory:  Negative for cough, chest tightness, shortness of breath and wheezing.    Cardiovascular:  Negative for chest pain, palpitations and leg swelling.   Gastrointestinal:  Negative for abdominal distention, abdominal pain, diarrhea and nausea.   Endocrine: Negative for polydipsia, polyphagia and polyuria.   Genitourinary:  Negative for dysuria, frequency, hematuria and urgency.   Skin:  Negative for pallor.   Neurological:  Negative for dizziness, tremors, seizures, speech difficulty, weakness and headaches.   Psychiatric/Behavioral:  Negative for confusion. The patient is nervous/anxious.      Objective:      Physical Exam  Constitutional:       General: She is not in acute distress.     Appearance: She is not ill-appearing or diaphoretic.   HENT:      Nose: Nose normal.   Eyes:      Conjunctiva/sclera: Conjunctivae normal.   Neck:      Comments: Normal thyroid 2+ carotids  Cardiovascular:      Rate and Rhythm: Normal rate and regular rhythm.      Heart sounds: No murmur heard.    No gallop.   Pulmonary:      Effort: Pulmonary effort is normal. No respiratory distress.      Breath sounds: No wheezing, rhonchi or rales.   Abdominal:      General: There is no distension.      Palpations: Abdomen is soft. There is no mass.      Tenderness: There is no abdominal tenderness.   Musculoskeletal:      Right foot: No deformity.      Left foot: No deformity (Good  capillary filling).   Feet:      Right foot:      Protective Sensation: 10 sites tested.  10 sites sensed.      Skin integrity: Skin integrity normal.      Toenail  "Condition: Right toenails are normal.      Left foot:      Protective Sensation: 10 sites tested.  10 sites sensed.      Skin integrity: Skin integrity normal.      Toenail Condition: Left toenails are normal.   Lymphadenopathy:      Cervical: No cervical adenopathy.   Skin:     General: Skin is warm and dry.      Coloration: Skin is not pale.      Findings: No erythema.   Neurological:      Mental Status: She is alert and oriented to person, place, and time.   Psychiatric:         Mood and Affect: Mood normal.         Behavior: Behavior normal.         Thought Content: Thought content normal.         Judgment: Judgment normal.       Hospital Outpatient Visit on 06/21/2022   Component Date Value Ref Range Status    Final Pathologic Diagnosis 06/21/2022    Final                    Value:LIVER, BIOPSY:  Macrovesicular steatosis - 15%  Few ballooned hepatocytes  Mild non-specific portal chronic inflammation  Glycogenated nuclei present  Increased pericellular and portal fibrosis (stage 1-2 of 4)  Negative for iron deposits  Causes of steatohepatitis are numerous and include drug/toxin (including  alcohol), metabolic conditions (including nonalcoholic fatty liver disease),  infections and others. Please correlate clinically.  [facility] [employer] SC[employer] SYSTEM  Steatosis grade - 1  Lobular inflammation - 0  Hepatocellular ballooning - 1  Fibrosis - 1/2  Special stains iron and trichrome performed.      Gross 06/21/2022    Final                    Value:Surgery ID:  [id number]   Pathology ID:  [id number]  1. Received in formalin labeled "random liver" are 2 red-brown core tissue  fragments each measuring 1.5 cm in length.  The specimen is submitted  entirely in cassette 1A.  YFB--1-A    Grossed by: Brien Santana      Disclaimer 06/21/2022    Final                    Value:Unless the case is a 'gross only' or additional testing only, the final  diagnosis for each specimen is based on a microscopic examination " of  appropriate tissue sections.       Assessment:       1. Primary hypertension    2. Controlled type 2 diabetes mellitus without complication, without long-term current use of insulin    3. Iron deficiency    4. Calcified granuloma of lung    5. BMI 34.0-34.9,adult    6. Paroxysmal SVT (supraventricular tachycardia)    7. Elevated liver enzymes    8. Hyperlipidemia associated with type 2 diabetes mellitus    9. Panic attack as reaction to stress          Plan:     Blood pressure controlled.  Lab was ordered.  Diabetic foot exam done today.  Medication reviewed  BMI 34.  She lost some weight.  Continue diet.  .    Primary hypertension  -     Lipid Panel; Future; Expected date: 09/12/2022  -     TSH; Future; Expected date: 09/12/2022    Controlled type 2 diabetes mellitus without complication, without long-term current use of insulin  -     Glucose, Fasting; Future; Expected date: 09/12/2022  -     Hemoglobin A1C; Future; Expected date: 09/12/2022  -     Microalbumin/Creatinine Ratio, Urine; Future; Expected date: 09/12/2022    Iron deficiency  -     CBC Auto Differential; Future; Expected date: 09/12/2022  -     Ferritin; Future; Expected date: 09/12/2022  -     Iron; Future; Expected date: 09/12/2022    Calcified granuloma of lung    BMI 34.0-34.9,adult    Paroxysmal SVT (supraventricular tachycardia)    Elevated liver enzymes    Hyperlipidemia associated with type 2 diabetes mellitus    Panic attack as reaction to stress

## 2022-09-13 ENCOUNTER — PATIENT MESSAGE (OUTPATIENT)
Dept: INTERNAL MEDICINE | Facility: CLINIC | Age: 72
End: 2022-09-13
Payer: MEDICARE

## 2022-09-13 ENCOUNTER — TELEPHONE (OUTPATIENT)
Dept: INTERNAL MEDICINE | Facility: CLINIC | Age: 72
End: 2022-09-13
Payer: MEDICARE

## 2022-09-13 LAB
BASOPHILS # BLD AUTO: 0.05 K/UL (ref 0–0.2)
BASOPHILS NFR BLD: 0.7 % (ref 0–1.9)
DIFFERENTIAL METHOD: ABNORMAL
EOSINOPHIL # BLD AUTO: 0.2 K/UL (ref 0–0.5)
EOSINOPHIL NFR BLD: 3.2 % (ref 0–8)
ERYTHROCYTE [DISTWIDTH] IN BLOOD BY AUTOMATED COUNT: 16.7 % (ref 11.5–14.5)
HCT VFR BLD AUTO: 42 % (ref 37–48.5)
HGB BLD-MCNC: 13.8 G/DL (ref 12–16)
IMM GRANULOCYTES # BLD AUTO: 0.01 K/UL (ref 0–0.04)
IMM GRANULOCYTES NFR BLD AUTO: 0.1 % (ref 0–0.5)
LYMPHOCYTES # BLD AUTO: 3.7 K/UL (ref 1–4.8)
LYMPHOCYTES NFR BLD: 49 % (ref 18–48)
MCH RBC QN AUTO: 24.7 PG (ref 27–31)
MCHC RBC AUTO-ENTMCNC: 32.9 G/DL (ref 32–36)
MCV RBC AUTO: 75 FL (ref 82–98)
MONOCYTES # BLD AUTO: 0.7 K/UL (ref 0.3–1)
MONOCYTES NFR BLD: 8.6 % (ref 4–15)
NEUTROPHILS # BLD AUTO: 2.9 K/UL (ref 1.8–7.7)
NEUTROPHILS NFR BLD: 38.4 % (ref 38–73)
NRBC BLD-RTO: 0 /100 WBC
PLATELET # BLD AUTO: 255 K/UL (ref 150–450)
PMV BLD AUTO: 10.5 FL (ref 9.2–12.9)
RBC # BLD AUTO: 5.59 M/UL (ref 4–5.4)
WBC # BLD AUTO: 7.55 K/UL (ref 3.9–12.7)

## 2022-09-13 NOTE — TELEPHONE ENCOUNTER
----- Message from Emil Zhang MD sent at 9/13/2022  8:07 AM CDT -----  No anemia iron levels are normal A1c is good at 6.0 rest lab looks good continue as planned  ----- Message -----  From: [facility]  Sent: 9/12/2022   9:53 PM CDT  To: Emil Zhang MD

## 2022-09-19 ENCOUNTER — TELEPHONE (OUTPATIENT)
Dept: OPHTHALMOLOGY | Facility: CLINIC | Age: 72
End: 2022-09-19
Payer: MEDICARE

## 2022-09-20 ENCOUNTER — OFFICE VISIT (OUTPATIENT)
Dept: HEPATOLOGY | Facility: CLINIC | Age: 72
End: 2022-09-20
Payer: MEDICARE

## 2022-09-20 VITALS
HEIGHT: 63 IN | WEIGHT: 196.88 LBS | HEART RATE: 99 BPM | DIASTOLIC BLOOD PRESSURE: 80 MMHG | BODY MASS INDEX: 34.88 KG/M2 | SYSTOLIC BLOOD PRESSURE: 120 MMHG

## 2022-09-20 DIAGNOSIS — K75.81 NASH (NONALCOHOLIC STEATOHEPATITIS): Primary | ICD-10-CM

## 2022-09-20 PROCEDURE — 3008F PR BODY MASS INDEX (BMI) DOCUMENTED: ICD-10-PCS | Mod: CPTII,S$GLB,, | Performed by: INTERNAL MEDICINE

## 2022-09-20 PROCEDURE — 3079F DIAST BP 80-89 MM HG: CPT | Mod: CPTII,S$GLB,, | Performed by: INTERNAL MEDICINE

## 2022-09-20 PROCEDURE — 3066F NEPHROPATHY DOC TX: CPT | Mod: CPTII,S$GLB,, | Performed by: INTERNAL MEDICINE

## 2022-09-20 PROCEDURE — 3072F PR LOW RISK FOR RETINOPATHY: ICD-10-PCS | Mod: CPTII,S$GLB,, | Performed by: INTERNAL MEDICINE

## 2022-09-20 PROCEDURE — 1101F PR PT FALLS ASSESS DOC 0-1 FALLS W/OUT INJ PAST YR: ICD-10-PCS | Mod: CPTII,S$GLB,, | Performed by: INTERNAL MEDICINE

## 2022-09-20 PROCEDURE — 1159F PR MEDICATION LIST DOCUMENTED IN MEDICAL RECORD: ICD-10-PCS | Mod: CPTII,S$GLB,, | Performed by: INTERNAL MEDICINE

## 2022-09-20 PROCEDURE — 99213 PR OFFICE/OUTPT VISIT, EST, LEVL III, 20-29 MIN: ICD-10-PCS | Mod: S$GLB,,, | Performed by: INTERNAL MEDICINE

## 2022-09-20 PROCEDURE — 3061F PR NEG MICROALBUMINURIA RESULT DOCUMENTED/REVIEW: ICD-10-PCS | Mod: CPTII,S$GLB,, | Performed by: INTERNAL MEDICINE

## 2022-09-20 PROCEDURE — 3074F PR MOST RECENT SYSTOLIC BLOOD PRESSURE < 130 MM HG: ICD-10-PCS | Mod: CPTII,S$GLB,, | Performed by: INTERNAL MEDICINE

## 2022-09-20 PROCEDURE — 4010F ACE/ARB THERAPY RXD/TAKEN: CPT | Mod: CPTII,S$GLB,, | Performed by: INTERNAL MEDICINE

## 2022-09-20 PROCEDURE — 3061F NEG MICROALBUMINURIA REV: CPT | Mod: CPTII,S$GLB,, | Performed by: INTERNAL MEDICINE

## 2022-09-20 PROCEDURE — 3008F BODY MASS INDEX DOCD: CPT | Mod: CPTII,S$GLB,, | Performed by: INTERNAL MEDICINE

## 2022-09-20 PROCEDURE — 99499 UNLISTED E&M SERVICE: CPT | Mod: S$GLB,,, | Performed by: INTERNAL MEDICINE

## 2022-09-20 PROCEDURE — 3072F LOW RISK FOR RETINOPATHY: CPT | Mod: CPTII,S$GLB,, | Performed by: INTERNAL MEDICINE

## 2022-09-20 PROCEDURE — 1101F PT FALLS ASSESS-DOCD LE1/YR: CPT | Mod: CPTII,S$GLB,, | Performed by: INTERNAL MEDICINE

## 2022-09-20 PROCEDURE — 99999 PR PBB SHADOW E&M-EST. PATIENT-LVL III: CPT | Mod: PBBFAC,,, | Performed by: INTERNAL MEDICINE

## 2022-09-20 PROCEDURE — 1159F MED LIST DOCD IN RCRD: CPT | Mod: CPTII,S$GLB,, | Performed by: INTERNAL MEDICINE

## 2022-09-20 PROCEDURE — 3044F HG A1C LEVEL LT 7.0%: CPT | Mod: CPTII,S$GLB,, | Performed by: INTERNAL MEDICINE

## 2022-09-20 PROCEDURE — 3074F SYST BP LT 130 MM HG: CPT | Mod: CPTII,S$GLB,, | Performed by: INTERNAL MEDICINE

## 2022-09-20 PROCEDURE — 3079F PR MOST RECENT DIASTOLIC BLOOD PRESSURE 80-89 MM HG: ICD-10-PCS | Mod: CPTII,S$GLB,, | Performed by: INTERNAL MEDICINE

## 2022-09-20 PROCEDURE — 99213 OFFICE O/P EST LOW 20 MIN: CPT | Mod: S$GLB,,, | Performed by: INTERNAL MEDICINE

## 2022-09-20 PROCEDURE — 3288F PR FALLS RISK ASSESSMENT DOCUMENTED: ICD-10-PCS | Mod: CPTII,S$GLB,, | Performed by: INTERNAL MEDICINE

## 2022-09-20 PROCEDURE — 3044F PR MOST RECENT HEMOGLOBIN A1C LEVEL <7.0%: ICD-10-PCS | Mod: CPTII,S$GLB,, | Performed by: INTERNAL MEDICINE

## 2022-09-20 PROCEDURE — 3066F PR DOCUMENTATION OF TREATMENT FOR NEPHROPATHY: ICD-10-PCS | Mod: CPTII,S$GLB,, | Performed by: INTERNAL MEDICINE

## 2022-09-20 PROCEDURE — 99999 PR PBB SHADOW E&M-EST. PATIENT-LVL III: ICD-10-PCS | Mod: PBBFAC,,, | Performed by: INTERNAL MEDICINE

## 2022-09-20 PROCEDURE — 99499 RISK ADDL DX/OHS AUDIT: ICD-10-PCS | Mod: S$GLB,,, | Performed by: INTERNAL MEDICINE

## 2022-09-20 PROCEDURE — 4010F PR ACE/ARB THEARPY RXD/TAKEN: ICD-10-PCS | Mod: CPTII,S$GLB,, | Performed by: INTERNAL MEDICINE

## 2022-09-20 PROCEDURE — 3288F FALL RISK ASSESSMENT DOCD: CPT | Mod: CPTII,S$GLB,, | Performed by: INTERNAL MEDICINE

## 2022-09-20 NOTE — PROGRESS NOTES
Subjective:     Cassandra Campos is here for evaluation of abnormal LFTs    History of Present Illness:  Cassandra Campos is here for eval of abnormal LFTs, she had persistent elevation of liver enzymes since 2019, prior to that she had fluctuating levels of for elevated transaminases, normal total bilirubin noted only 1 time elevation of alkaline phosphatase.  Mainly ALT is elevated, but AST and ALT are less than twice the upper limit of normal.  denies liver related complaints    Duration of abnormality- since 2020  Medications/OTC/Herbal- denies   ETOH- occasional  Metabolic issues- HTN, DM< Hyperlipidemia   BMI-36  Family Hx-none    Interval history 06/08/2022;  Since her last visit 5 weeks ago, she lost 5 lb.  She changed her dietary habits to low-calorie, low-carbohydrate, high-protein diet, also started exercising.  She is motivated to lose 15 more lbs.  Denies liver related complaints, no hospitalizations, no new medication.    Interval history: 6/29/22  Came to discuss biopsy findings. No liver related complaints since last visit     LIVER, BIOPSY: 6/21/22  Macrovesicular steatosis - 15%   Few ballooned hepatocytes   Mild non-specific portal chronic inflammation   Glycogenated nuclei present   Increased pericellular and portal fibrosis (stage 1-2 of 4)   Negative for iron deposits   Causes of steatohepatitis are numerous and include drug/toxin (including   alcohol), metabolic conditions (including nonalcoholic fatty liver disease),   infections and others. Please correlate clinically.   [facility] [employer] SCORING SYSTEM   Steatosis grade - 1   Lobular inflammation - 0   Hepatocellular ballooning - 1   Fibrosis - 1/2     9/20/22  Watching diet, focusing on low carb, low sugar, lost 10 lbs. Feeling better, started walking 30 minutes once a week.     Review of Systems   Constitutional:  Negative for fatigue and fever.   Gastrointestinal:  Negative for abdominal pain, blood in stool, nausea and  vomiting.   Musculoskeletal:  Positive for arthralgias and back pain.   Hematological:  Does not bruise/bleed easily.   Psychiatric/Behavioral:  Negative for confusion.      Objective:     Physical Exam  Vitals reviewed.   Constitutional:       Appearance: Normal appearance. She is obese.   Eyes:      General: No scleral icterus.  Abdominal:      General: Bowel sounds are normal. There is no distension.      Palpations: There is no mass.      Tenderness: There is no abdominal tenderness.   Musculoskeletal:      Right lower leg: No edema.      Left lower leg: No edema.   Skin:     General: Skin is warm and dry.      Coloration: Skin is not jaundiced.   Neurological:      Mental Status: She is alert and oriented to person, place, and time.   Psychiatric:         Thought Content: Thought content normal.       MELD-Na score: 6 at 6/21/2022  9:35 AM  MELD score: 6 at 6/21/2022  9:35 AM  Calculated from:  Serum Creatinine: 0.7 mg/dL (Using min of 1 mg/dL) at 6/21/2022  9:35 AM  Serum Sodium: 138 mmol/L (Using max of 137 mmol/L) at 6/21/2022  9:35 AM  Total Bilirubin: 0.3 mg/dL (Using min of 1 mg/dL) at 6/21/2022  9:35 AM  INR(ratio): 1.0 at 6/21/2022  9:35 AM  Age: 71 years    WBC   Date Value Ref Range Status   09/12/2022 7.55 3.90 - 12.70 K/uL Final     Hemoglobin   Date Value Ref Range Status   09/12/2022 13.8 12.0 - 16.0 g/dL Final     Hematocrit   Date Value Ref Range Status   09/12/2022 42.0 37.0 - 48.5 % Final     Platelets   Date Value Ref Range Status   09/12/2022 255 150 - 450 K/uL Final     BUN   Date Value Ref Range Status   09/12/2022 15 8 - 23 mg/dL Final     Creatinine   Date Value Ref Range Status   09/12/2022 0.8 0.5 - 1.4 mg/dL Final     Glucose   Date Value Ref Range Status   09/12/2022 88 70 - 110 mg/dL Final     Calcium   Date Value Ref Range Status   09/12/2022 10.1 8.7 - 10.5 mg/dL Final     Sodium   Date Value Ref Range Status   09/12/2022 137 136 - 145 mmol/L Final     Potassium   Date Value Ref  Range Status   09/12/2022 4.2 3.5 - 5.1 mmol/L Final     Chloride   Date Value Ref Range Status   09/12/2022 102 95 - 110 mmol/L Final     Magnesium   Date Value Ref Range Status   02/14/2017 1.6 1.6 - 2.6 mg/dL Final     AST   Date Value Ref Range Status   09/12/2022 33 10 - 40 U/L Final     ALT   Date Value Ref Range Status   09/12/2022 42 10 - 44 U/L Final     Alkaline Phosphatase   Date Value Ref Range Status   09/12/2022 96 55 - 135 U/L Final     Total Bilirubin   Date Value Ref Range Status   09/12/2022 0.3 0.1 - 1.0 mg/dL Final     Comment:     For infants and newborns, interpretation of results should be based  on gestational age, weight and in agreement with clinical  observations.    Premature Infant recommended reference ranges:  Up to 24 hours.............<8.0 mg/dL  Up to 48 hours............<12.0 mg/dL  3-5 days..................<15.0 mg/dL  6-29 days.................<15.0 mg/dL       Albumin   Date Value Ref Range Status   09/12/2022 3.8 3.5 - 5.2 g/dL Final     INR   Date Value Ref Range Status   06/21/2022 1.0 0.8 - 1.2 Final     Comment:     Coumadin Therapy:  2.0 - 3.0 for INR for all indicators except mechanical heart valves  and antiphospholipid syndromes which should use 2.5 - 3.5.           Assessment/Plan:     1.Abnormal LFTs/ Steatohepatitis:  Suspect secondary to TSANG  Work up showed positive DEBORAH  1:1280, IgG 2133, IgA 418  Underlying autoimmune liver disease can not  be ruled out. S/p liver biopsy shows macrovesicular steatosis 15%, ballooning hepatocytes,steatohepatitis, no lobular inflammation or bile duct injury. F2 fibrosis  VCTE showed S3 steatosis and F3 fibrosis.    2.Obesity-class -1  Discussed dietary recommendations- Low calorie, low carbohydrate, high protein diet with goal of loosing >3-5% to improve steatosis and >7-10% to improve histological changes if present    3.Metabolic syndrome:   Reduction of risk factors for CVD  Continued treatment for HTN  Optimization of blood  glucose control.  Continue lipid-lowering therapy  Increase physical activity     LFTs improving, A1C improved, LDL and cholesterol decreased. Encouraged to continue to loose weight, increase exercise    RTC: 6 months     I have reviewed existing labs, imaging, procedures. Educated patient about disease process, prognosis. Ordered required labs, images and discussed treatment plan.     Loretta Roque MD  Transplant Hepatologist  Dept of Hepatology, [place] [facility]

## 2022-09-25 ENCOUNTER — PATIENT MESSAGE (OUTPATIENT)
Dept: INTERNAL MEDICINE | Facility: CLINIC | Age: 72
End: 2022-09-25
Payer: MEDICARE

## 2022-09-26 DIAGNOSIS — Z86.69 HISTORY OF MIGRAINE: ICD-10-CM

## 2022-09-26 RX ORDER — AMITRIPTYLINE HYDROCHLORIDE 100 MG/1
100 TABLET ORAL NIGHTLY
Qty: 15 TABLET | Refills: 1 | Status: SHIPPED | OUTPATIENT
Start: 2022-09-26 | End: 2022-12-15 | Stop reason: SDUPTHER

## 2022-10-05 ENCOUNTER — PATIENT MESSAGE (OUTPATIENT)
Dept: INTERNAL MEDICINE | Facility: CLINIC | Age: 72
End: 2022-10-05
Payer: MEDICARE

## 2022-10-05 RX ORDER — PROMETHAZINE HYDROCHLORIDE AND DEXTROMETHORPHAN HYDROBROMIDE 6.25; 15 MG/5ML; MG/5ML
5 SYRUP ORAL 3 TIMES DAILY
Qty: 150 ML | Refills: 0 | Status: SHIPPED | OUTPATIENT
Start: 2022-10-05 | End: 2022-10-13

## 2022-10-06 ENCOUNTER — PATIENT OUTREACH (OUTPATIENT)
Dept: ADMINISTRATIVE | Facility: HOSPITAL | Age: 72
End: 2022-10-06
Payer: MEDICARE

## 2022-10-13 ENCOUNTER — PATIENT MESSAGE (OUTPATIENT)
Dept: INTERNAL MEDICINE | Facility: CLINIC | Age: 72
End: 2022-10-13
Payer: MEDICARE

## 2022-10-13 RX ORDER — PROMETHAZINE HYDROCHLORIDE AND CODEINE PHOSPHATE 6.25; 1 MG/5ML; MG/5ML
5 SOLUTION ORAL EVERY 6 HOURS PRN
Qty: 240 ML | Refills: 0 | Status: SHIPPED | OUTPATIENT
Start: 2022-10-13 | End: 2022-10-24 | Stop reason: SDUPTHER

## 2022-10-18 ENCOUNTER — PATIENT MESSAGE (OUTPATIENT)
Dept: ADMINISTRATIVE | Facility: HOSPITAL | Age: 72
End: 2022-10-18
Payer: MEDICARE

## 2022-10-21 ENCOUNTER — PATIENT MESSAGE (OUTPATIENT)
Dept: INTERNAL MEDICINE | Facility: CLINIC | Age: 72
End: 2022-10-21
Payer: MEDICARE

## 2022-10-23 ENCOUNTER — PATIENT MESSAGE (OUTPATIENT)
Dept: INTERNAL MEDICINE | Facility: CLINIC | Age: 72
End: 2022-10-23
Payer: MEDICARE

## 2022-10-24 ENCOUNTER — PATIENT MESSAGE (OUTPATIENT)
Dept: INTERNAL MEDICINE | Facility: CLINIC | Age: 72
End: 2022-10-24
Payer: MEDICARE

## 2022-10-24 RX ORDER — PROMETHAZINE HYDROCHLORIDE AND CODEINE PHOSPHATE 6.25; 1 MG/5ML; MG/5ML
5 SOLUTION ORAL EVERY 6 HOURS PRN
Qty: 240 ML | Refills: 0 | Status: SHIPPED | OUTPATIENT
Start: 2022-10-24 | End: 2022-10-25 | Stop reason: SDUPTHER

## 2022-10-25 RX ORDER — PROMETHAZINE HYDROCHLORIDE AND CODEINE PHOSPHATE 6.25; 1 MG/5ML; MG/5ML
5 SOLUTION ORAL EVERY 6 HOURS PRN
Qty: 240 ML | Refills: 0 | Status: SHIPPED | OUTPATIENT
Start: 2022-10-25 | End: 2022-11-06

## 2022-11-11 ENCOUNTER — PATIENT MESSAGE (OUTPATIENT)
Dept: INTERNAL MEDICINE | Facility: CLINIC | Age: 72
End: 2022-11-11
Payer: MEDICARE

## 2022-11-11 ENCOUNTER — TELEPHONE (OUTPATIENT)
Dept: INTERNAL MEDICINE | Facility: CLINIC | Age: 72
End: 2022-11-11
Payer: MEDICARE

## 2022-11-11 NOTE — TELEPHONE ENCOUNTER
Returned call to patient regarding UTI patient stated she took a home test but is going to follow up at Urgent Care since Dr Zhang is out of clinic today.

## 2022-11-15 ENCOUNTER — TELEPHONE (OUTPATIENT)
Dept: ADMINISTRATIVE | Facility: HOSPITAL | Age: 72
End: 2022-11-15
Payer: MEDICARE

## 2022-11-20 ENCOUNTER — PATIENT MESSAGE (OUTPATIENT)
Dept: INTERNAL MEDICINE | Facility: CLINIC | Age: 72
End: 2022-11-20
Payer: MEDICARE

## 2022-11-26 ENCOUNTER — NURSE TRIAGE (OUTPATIENT)
Dept: ADMINISTRATIVE | Facility: CLINIC | Age: 72
End: 2022-11-26
Payer: MEDICARE

## 2022-11-26 NOTE — TELEPHONE ENCOUNTER
Please be advised per triage message     Spoke with patient states she has moderate pain, redness, swelling, and clear drainage coming from right eye.  Patient states her symptoms started 97.4.  Patient states she has tried visine no relief.  Symptoms started yesterday.  Advised patient to be seen in 4 hours.  Patient verbalized understanding.   Reason for Disposition   MODERATE eye pain (e.g., interferes with normal activities)    Additional Information   Negative: SEVERE eye pain (e.g., excruciating)   Negative: [1] Eyelids are very swollen (shut or almost) AND [2] fever   Negative: [1] Eyelid (outer) is very red (or tender to touch) AND [2] fever   Negative: Patient sounds very sick or weak to the triager    Protocols used: Eye - Pus or [id number]

## 2022-11-26 NOTE — TELEPHONE ENCOUNTER
Spoke with patient states she has moderate pain, redness, swelling, and clear drainage coming from right eye.  Patient states her symptoms started 97.4.  Patient states she has tried visine no relief.  Symptoms started yesterday.  Advised patient to be seen in 4 hours.  Patient verbalized understanding.   Reason for Disposition   MODERATE eye pain (e.g., interferes with normal activities)    Additional Information   Negative: SEVERE eye pain (e.g., excruciating)   Negative: [1] Eyelids are very swollen (shut or almost) AND [2] fever   Negative: [1] Eyelid (outer) is very red (or tender to touch) AND [2] fever   Negative: Patient sounds very sick or weak to the triager    Protocols used: Eye - Pus or [id number]

## 2022-12-07 DIAGNOSIS — E11.9 DM II (DIABETES MELLITUS, TYPE II), CONTROLLED: ICD-10-CM

## 2022-12-07 RX ORDER — CALCIUM CITRATE/VITAMIN D3 200MG-6.25
TABLET ORAL
Qty: 100 STRIP | Refills: 3 | Status: SHIPPED | OUTPATIENT
Start: 2022-12-07 | End: 2023-01-25

## 2022-12-07 NOTE — TELEPHONE ENCOUNTER
Refill Decision Note   Cassandra Campos  is requesting a refill authorization.  Brief Assessment and Rationale for Refill:  Approve     Medication Therapy Plan:       Medication Reconciliation Completed: No   Comments:     No Care Gaps recommended.     Note composed:4:44 PM 12/07/2022           [facility]

## 2022-12-07 NOTE — TELEPHONE ENCOUNTER
No new care gaps identified.  [facility] Embedded Care Gaps. Reference number: [id number]. 12/07/2022   1:05:46 AM CST

## 2022-12-15 ENCOUNTER — LAB VISIT (OUTPATIENT)
Dept: LAB | Facility: HOSPITAL | Age: 72
End: 2022-12-15
Attending: FAMILY MEDICINE
Payer: MEDICARE

## 2022-12-15 ENCOUNTER — OFFICE VISIT (OUTPATIENT)
Dept: INTERNAL MEDICINE | Facility: CLINIC | Age: 72
End: 2022-12-15
Payer: MEDICARE

## 2022-12-15 VITALS
HEIGHT: 63 IN | TEMPERATURE: 98 F | BODY MASS INDEX: 35.27 KG/M2 | DIASTOLIC BLOOD PRESSURE: 80 MMHG | SYSTOLIC BLOOD PRESSURE: 126 MMHG | HEART RATE: 89 BPM | WEIGHT: 199.06 LBS | OXYGEN SATURATION: 97 %

## 2022-12-15 DIAGNOSIS — E66.01 SEVERE OBESITY (BMI 35.0-39.9) WITH COMORBIDITY: ICD-10-CM

## 2022-12-15 DIAGNOSIS — I10 PRIMARY HYPERTENSION: ICD-10-CM

## 2022-12-15 DIAGNOSIS — E11.9 CONTROLLED TYPE 2 DIABETES MELLITUS WITHOUT COMPLICATION, WITHOUT LONG-TERM CURRENT USE OF INSULIN: ICD-10-CM

## 2022-12-15 DIAGNOSIS — E11.69 HYPERLIPIDEMIA ASSOCIATED WITH TYPE 2 DIABETES MELLITUS: ICD-10-CM

## 2022-12-15 DIAGNOSIS — E78.5 HYPERLIPIDEMIA ASSOCIATED WITH TYPE 2 DIABETES MELLITUS: ICD-10-CM

## 2022-12-15 DIAGNOSIS — E11.9 CONTROLLED TYPE 2 DIABETES MELLITUS WITHOUT COMPLICATION, WITHOUT LONG-TERM CURRENT USE OF INSULIN: Primary | ICD-10-CM

## 2022-12-15 LAB
ESTIMATED AVG GLUCOSE: 126 MG/DL (ref 68–131)
GLUCOSE SERPL-MCNC: 121 MG/DL (ref 70–110)
HBA1C MFR BLD: 6 % (ref 4–5.6)

## 2022-12-15 PROCEDURE — 1125F AMNT PAIN NOTED PAIN PRSNT: CPT | Mod: HCNC,CPTII,S$GLB, | Performed by: FAMILY MEDICINE

## 2022-12-15 PROCEDURE — 99999 PR PBB SHADOW E&M-EST. PATIENT-LVL IV: CPT | Mod: PBBFAC,HCNC,, | Performed by: FAMILY MEDICINE

## 2022-12-15 PROCEDURE — 3074F PR MOST RECENT SYSTOLIC BLOOD PRESSURE < 130 MM HG: ICD-10-PCS | Mod: HCNC,CPTII,S$GLB, | Performed by: FAMILY MEDICINE

## 2022-12-15 PROCEDURE — 3288F PR FALLS RISK ASSESSMENT DOCUMENTED: ICD-10-PCS | Mod: HCNC,CPTII,S$GLB, | Performed by: FAMILY MEDICINE

## 2022-12-15 PROCEDURE — 99999 PR PBB SHADOW E&M-EST. PATIENT-LVL IV: ICD-10-PCS | Mod: PBBFAC,HCNC,, | Performed by: FAMILY MEDICINE

## 2022-12-15 PROCEDURE — 3066F NEPHROPATHY DOC TX: CPT | Mod: HCNC,CPTII,S$GLB, | Performed by: FAMILY MEDICINE

## 2022-12-15 PROCEDURE — 82947 ASSAY GLUCOSE BLOOD QUANT: CPT | Mod: HCNC | Performed by: FAMILY MEDICINE

## 2022-12-15 PROCEDURE — 99214 PR OFFICE/OUTPT VISIT, EST, LEVL IV, 30-39 MIN: ICD-10-PCS | Mod: HCNC,S$GLB,, | Performed by: FAMILY MEDICINE

## 2022-12-15 PROCEDURE — 90694 VACC AIIV4 NO PRSRV 0.5ML IM: CPT | Mod: HCNC,S$GLB,, | Performed by: FAMILY MEDICINE

## 2022-12-15 PROCEDURE — 1101F PR PT FALLS ASSESS DOC 0-1 FALLS W/OUT INJ PAST YR: ICD-10-PCS | Mod: HCNC,CPTII,S$GLB, | Performed by: FAMILY MEDICINE

## 2022-12-15 PROCEDURE — 1159F MED LIST DOCD IN RCRD: CPT | Mod: HCNC,CPTII,S$GLB, | Performed by: FAMILY MEDICINE

## 2022-12-15 PROCEDURE — G0008 ADMIN INFLUENZA VIRUS VAC: HCPCS | Mod: HCNC,S$GLB,, | Performed by: FAMILY MEDICINE

## 2022-12-15 PROCEDURE — 36415 COLL VENOUS BLD VENIPUNCTURE: CPT | Mod: HCNC | Performed by: FAMILY MEDICINE

## 2022-12-15 PROCEDURE — 3079F PR MOST RECENT DIASTOLIC BLOOD PRESSURE 80-89 MM HG: ICD-10-PCS | Mod: HCNC,CPTII,S$GLB, | Performed by: FAMILY MEDICINE

## 2022-12-15 PROCEDURE — 3066F PR DOCUMENTATION OF TREATMENT FOR NEPHROPATHY: ICD-10-PCS | Mod: HCNC,CPTII,S$GLB, | Performed by: FAMILY MEDICINE

## 2022-12-15 PROCEDURE — 3072F LOW RISK FOR RETINOPATHY: CPT | Mod: HCNC,CPTII,S$GLB, | Performed by: FAMILY MEDICINE

## 2022-12-15 PROCEDURE — 3008F BODY MASS INDEX DOCD: CPT | Mod: HCNC,CPTII,S$GLB, | Performed by: FAMILY MEDICINE

## 2022-12-15 PROCEDURE — 3044F HG A1C LEVEL LT 7.0%: CPT | Mod: HCNC,CPTII,S$GLB, | Performed by: FAMILY MEDICINE

## 2022-12-15 PROCEDURE — 1159F PR MEDICATION LIST DOCUMENTED IN MEDICAL RECORD: ICD-10-PCS | Mod: HCNC,CPTII,S$GLB, | Performed by: FAMILY MEDICINE

## 2022-12-15 PROCEDURE — 4010F ACE/ARB THERAPY RXD/TAKEN: CPT | Mod: HCNC,CPTII,S$GLB, | Performed by: FAMILY MEDICINE

## 2022-12-15 PROCEDURE — 99214 OFFICE O/P EST MOD 30 MIN: CPT | Mod: HCNC,S$GLB,, | Performed by: FAMILY MEDICINE

## 2022-12-15 PROCEDURE — 3061F NEG MICROALBUMINURIA REV: CPT | Mod: HCNC,CPTII,S$GLB, | Performed by: FAMILY MEDICINE

## 2022-12-15 PROCEDURE — 3074F SYST BP LT 130 MM HG: CPT | Mod: HCNC,CPTII,S$GLB, | Performed by: FAMILY MEDICINE

## 2022-12-15 PROCEDURE — 90694 FLU VACCINE - QUADRIVALENT - ADJUVANTED: ICD-10-PCS | Mod: HCNC,S$GLB,, | Performed by: FAMILY MEDICINE

## 2022-12-15 PROCEDURE — 3008F PR BODY MASS INDEX (BMI) DOCUMENTED: ICD-10-PCS | Mod: HCNC,CPTII,S$GLB, | Performed by: FAMILY MEDICINE

## 2022-12-15 PROCEDURE — 3079F DIAST BP 80-89 MM HG: CPT | Mod: HCNC,CPTII,S$GLB, | Performed by: FAMILY MEDICINE

## 2022-12-15 PROCEDURE — 4010F PR ACE/ARB THEARPY RXD/TAKEN: ICD-10-PCS | Mod: HCNC,CPTII,S$GLB, | Performed by: FAMILY MEDICINE

## 2022-12-15 PROCEDURE — 83036 HEMOGLOBIN GLYCOSYLATED A1C: CPT | Mod: HCNC | Performed by: FAMILY MEDICINE

## 2022-12-15 PROCEDURE — 1101F PT FALLS ASSESS-DOCD LE1/YR: CPT | Mod: HCNC,CPTII,S$GLB, | Performed by: FAMILY MEDICINE

## 2022-12-15 PROCEDURE — 3061F PR NEG MICROALBUMINURIA RESULT DOCUMENTED/REVIEW: ICD-10-PCS | Mod: HCNC,CPTII,S$GLB, | Performed by: FAMILY MEDICINE

## 2022-12-15 PROCEDURE — 3044F PR MOST RECENT HEMOGLOBIN A1C LEVEL <7.0%: ICD-10-PCS | Mod: HCNC,CPTII,S$GLB, | Performed by: FAMILY MEDICINE

## 2022-12-15 PROCEDURE — 3288F FALL RISK ASSESSMENT DOCD: CPT | Mod: HCNC,CPTII,S$GLB, | Performed by: FAMILY MEDICINE

## 2022-12-15 PROCEDURE — 1125F PR PAIN SEVERITY QUANTIFIED, PAIN PRESENT: ICD-10-PCS | Mod: HCNC,CPTII,S$GLB, | Performed by: FAMILY MEDICINE

## 2022-12-15 PROCEDURE — G0008 FLU VACCINE - QUADRIVALENT - ADJUVANTED: ICD-10-PCS | Mod: HCNC,S$GLB,, | Performed by: FAMILY MEDICINE

## 2022-12-15 PROCEDURE — 3072F PR LOW RISK FOR RETINOPATHY: ICD-10-PCS | Mod: HCNC,CPTII,S$GLB, | Performed by: FAMILY MEDICINE

## 2022-12-15 RX ORDER — SEMAGLUTIDE 1.34 MG/ML
0.25 INJECTION, SOLUTION SUBCUTANEOUS
Qty: 1 PEN | Refills: 1 | Status: SHIPPED | OUTPATIENT
Start: 2022-12-15 | End: 2022-12-15 | Stop reason: SDUPTHER

## 2022-12-15 RX ORDER — SEMAGLUTIDE 1.34 MG/ML
0.25 INJECTION, SOLUTION SUBCUTANEOUS
Qty: 1 PEN | Refills: 1 | Status: SHIPPED | OUTPATIENT
Start: 2022-12-15 | End: 2023-01-09 | Stop reason: SDUPTHER

## 2022-12-15 NOTE — PROGRESS NOTES
Subjective:       Patient ID: Cassandra Campos is a 72 y.o. female.    Chief Complaint: Follow-up    Follow-up hypertension hyperlipidemia diabetes BMI 35.26.  She is on metformin XR.  She reports diarrhea gas and bloating with the medication.  She would like to try Ozempic.  She denies chest pain palpitations shortness breath or edema.    Follow-up  Associated symptoms include nausea. Pertinent negatives include no abdominal pain, chest pain, chills, coughing, fever, headaches, vomiting or weakness.   Review of Systems   Constitutional:  Negative for chills and fever.   Eyes:         She is scheduled for diabetic eye exam   Respiratory:  Negative for cough, chest tightness, shortness of breath and wheezing.    Cardiovascular:  Negative for chest pain, palpitations and leg swelling.   Gastrointestinal:  Positive for abdominal distention, diarrhea and nausea. Negative for abdominal pain and vomiting.   Endocrine: Negative for polydipsia and polyuria.   Neurological:  Negative for dizziness, weakness, light-headedness and headaches.     Objective:      Physical Exam  Constitutional:       General: She is not in acute distress.     Appearance: She is not ill-appearing or diaphoretic.   Cardiovascular:      Rate and Rhythm: Normal rate and regular rhythm.      Heart sounds: No murmur heard.    No gallop.   Pulmonary:      Effort: Pulmonary effort is normal. No respiratory distress.      Breath sounds: No wheezing, rhonchi or rales.   Abdominal:      General: There is no distension.   Lymphadenopathy:      Cervical: No cervical adenopathy.   Skin:     Coloration: Skin is not pale.      Findings: No erythema.   Neurological:      Mental Status: She is alert.       Lab Visit on 09/12/2022   Component Date Value Ref Range Status    Microalbumin, Urine 09/12/2022 9.0  ug/mL Final    Creatinine, Urine 09/12/2022 55.0  15.0 - 325.0 mg/dL Final    Microalb/Creat Ratio 09/12/2022 16.4  0.0 - 30.0 ug/mg Final     Assessment:        1. Controlled type 2 diabetes mellitus without complication, without long-term current use of insulin    2. Severe obesity (BMI 35.0-39.9) with comorbidity    3. Primary hypertension    4. Hyperlipidemia associated with type 2 diabetes mellitus          Plan:     Blood pressure controlled glucose A1c was ordered.  Discontinue metformin and start Ozempic 0.25 mg weekly.  Follow-up 1 month    Controlled type 2 diabetes mellitus without complication, without long-term current use of insulin  -     Hemoglobin A1C; Future; Expected date: 12/15/2022  -     Glucose, Fasting; Future; Expected date: 12/15/2022    Severe obesity (BMI 35.0-39.9) with comorbidity    Primary hypertension    Hyperlipidemia associated with type 2 diabetes mellitus    Other orders  -     semaglutide (OZEMPIC) 0.25 mg or 0.5 mg(2 mg/1.5 mL) pen injector; Inject 0.25 mg into the skin every 7 days.  Dispense: 1 pen; Refill: 1

## 2023-01-01 NOTE — TELEPHONE ENCOUNTER
----- Message from Lachelle Orosco sent at 10/23/2018  3:19 PM CDT -----  Contact: Pharmacy  1. What is the name of the medication you are requesting? Rx Amitriptyline  2. What is the dose? 100 mg  3. How do you take the medication? Orally, topically, etc? oral  4. How often do you take this medication? Once a day   5. Do you need a 30 day or 90 day supply? 30 days if possible   6. How many refills are you requesting? 0  7. What is your preferred pharmacy and location of the pharmacy?   [facility]/pharmacy #[id number] - [place] - [address] AT AT [facility]  [address]  [place] [address]  Phone: [phone] Fax: [phone]  8. Who can we contact with further questions? Patient/[phone], She does not have a year refill she only got 4 refills that was back in 1/9/18 on the last prescription. And she wants to know if she can get enough to cover her at a local pharmacy.     Infant is sleeping in open crib at this time, repeat bilirubin id 10.6, Dr Francis of [facility] notified, says will see talk to the parents  this AM.

## 2023-01-09 ENCOUNTER — PATIENT MESSAGE (OUTPATIENT)
Dept: INTERNAL MEDICINE | Facility: CLINIC | Age: 73
End: 2023-01-09
Payer: MEDICARE

## 2023-01-09 RX ORDER — SEMAGLUTIDE 1.34 MG/ML
0.25 INJECTION, SOLUTION SUBCUTANEOUS
Qty: 1 PEN | Refills: 1 | Status: SHIPPED | OUTPATIENT
Start: 2023-01-09 | End: 2023-01-17 | Stop reason: SDUPTHER

## 2023-01-09 NOTE — TELEPHONE ENCOUNTER
No new care gaps identified.  [facility] Embedded Care Gaps. Reference number: [id number]. 1/09/2023   11:22:26 AM CST

## 2023-01-17 ENCOUNTER — PATIENT MESSAGE (OUTPATIENT)
Dept: INTERNAL MEDICINE | Facility: CLINIC | Age: 73
End: 2023-01-17
Payer: MEDICARE

## 2023-01-17 RX ORDER — SEMAGLUTIDE 1.34 MG/ML
0.25 INJECTION, SOLUTION SUBCUTANEOUS
Qty: 1 PEN | Refills: 1 | Status: SHIPPED | OUTPATIENT
Start: 2023-01-17 | End: 2023-01-17

## 2023-01-17 NOTE — TELEPHONE ENCOUNTER
No new care gaps identified.  [facility] Embedded Care Gaps. Reference number: [id number]. 1/17/2023   10:21:01 AM CST

## 2023-01-19 ENCOUNTER — PATIENT MESSAGE (OUTPATIENT)
Dept: INTERNAL MEDICINE | Facility: CLINIC | Age: 73
End: 2023-01-19
Payer: MEDICARE

## 2023-01-19 RX ORDER — SEMAGLUTIDE 1.34 MG/ML
0.25 INJECTION, SOLUTION SUBCUTANEOUS
Qty: 1 PEN | Refills: 1 | Status: CANCELLED | OUTPATIENT
Start: 2023-01-19 | End: 2024-01-19

## 2023-01-19 NOTE — TELEPHONE ENCOUNTER
No new care gaps identified.  [facility] Embedded Care Gaps. Reference number: [id number]. 1/19/2023   1:14:24 PM CST

## 2023-01-19 NOTE — TELEPHONE ENCOUNTER
----- Message from Jyotsna Galvan sent at 1/19/2023  1:15 PM CST -----  Contact: [phone]  Patient states [facility] does not have her Ozempic in stock ,Please send script to [facility].Please call back  patient at [phone].Thanks

## 2023-01-20 RX ORDER — SEMAGLUTIDE 1.34 MG/ML
0.25 INJECTION, SOLUTION SUBCUTANEOUS
Qty: 1 PEN | Refills: 1 | Status: SHIPPED | OUTPATIENT
Start: 2023-01-20 | End: 2023-02-27

## 2023-01-20 NOTE — TELEPHONE ENCOUNTER
No new care gaps identified.  [facility] Embedded Care Gaps. Reference number: [id number]. 1/20/2023   9:46:11 AM CST

## 2023-01-24 ENCOUNTER — PATIENT MESSAGE (OUTPATIENT)
Dept: OPHTHALMOLOGY | Facility: CLINIC | Age: 73
End: 2023-01-24

## 2023-01-24 ENCOUNTER — OFFICE VISIT (OUTPATIENT)
Dept: OPHTHALMOLOGY | Facility: CLINIC | Age: 73
End: 2023-01-24
Payer: MEDICARE

## 2023-01-24 ENCOUNTER — PATIENT MESSAGE (OUTPATIENT)
Dept: INTERNAL MEDICINE | Facility: CLINIC | Age: 73
End: 2023-01-24
Payer: MEDICARE

## 2023-01-24 DIAGNOSIS — E11.9 TYPE 2 DIABETES MELLITUS WITHOUT RETINOPATHY: Primary | ICD-10-CM

## 2023-01-24 DIAGNOSIS — H52.7 REFRACTIVE ERRORS: ICD-10-CM

## 2023-01-24 DIAGNOSIS — Z96.1 PSEUDOPHAKIA OF BOTH EYES: ICD-10-CM

## 2023-01-24 PROCEDURE — 1159F PR MEDICATION LIST DOCUMENTED IN MEDICAL RECORD: ICD-10-PCS | Mod: CPTII,S$GLB,, | Performed by: OPTOMETRIST

## 2023-01-24 PROCEDURE — 2023F PR DILATED RETINAL EXAM W/O EVID OF RETINOPATHY: ICD-10-PCS | Mod: CPTII,S$GLB,, | Performed by: OPTOMETRIST

## 2023-01-24 PROCEDURE — 92015 PR REFRACTION: ICD-10-PCS | Mod: S$GLB,,, | Performed by: OPTOMETRIST

## 2023-01-24 PROCEDURE — 99999 PR PBB SHADOW E&M-EST. PATIENT-LVL II: ICD-10-PCS | Mod: PBBFAC,,, | Performed by: OPTOMETRIST

## 2023-01-24 PROCEDURE — 1159F MED LIST DOCD IN RCRD: CPT | Mod: CPTII,S$GLB,, | Performed by: OPTOMETRIST

## 2023-01-24 PROCEDURE — 92014 PR EYE EXAM, EST PATIENT,COMPREHESV: ICD-10-PCS | Mod: S$GLB,,, | Performed by: OPTOMETRIST

## 2023-01-24 PROCEDURE — 92014 COMPRE OPH EXAM EST PT 1/>: CPT | Mod: S$GLB,,, | Performed by: OPTOMETRIST

## 2023-01-24 PROCEDURE — 99999 PR PBB SHADOW E&M-EST. PATIENT-LVL II: CPT | Mod: PBBFAC,,, | Performed by: OPTOMETRIST

## 2023-01-24 PROCEDURE — 2023F DILAT RTA XM W/O RTNOPTHY: CPT | Mod: CPTII,S$GLB,, | Performed by: OPTOMETRIST

## 2023-01-24 PROCEDURE — 92015 DETERMINE REFRACTIVE STATE: CPT | Mod: S$GLB,,, | Performed by: OPTOMETRIST

## 2023-01-24 NOTE — PROGRESS NOTES
HPI     Diabetic Eye Exam     Additional comments: Lab Results       Component                Value               Date                       HGBA1C                   6.0 (H)             12/15/2022                       Comments    Pt. States vision unchanged since last year.  No other complaints.    Lab Results       Component                Value               Date                       HGBA1C                   6.0 (H)             12/15/2022                      Last edited by Jennifer Verdugo MA on 1/24/2023 10:00 AM.            Assessment /Plan     For exam results, see Encounter Report.    Type 2 diabetes mellitus without retinopathy    Pseudophakia of both eyes    Refractive errors      No Background Diabetic Retinopathy    Stable IOL OU.    Dispense Final Rx for glasses.  RTC 1 year  Discussed above and answered questions.

## 2023-01-25 DIAGNOSIS — E11.9 DM II (DIABETES MELLITUS, TYPE II), CONTROLLED: ICD-10-CM

## 2023-02-03 ENCOUNTER — PATIENT MESSAGE (OUTPATIENT)
Dept: INTERNAL MEDICINE | Facility: CLINIC | Age: 73
End: 2023-02-03
Payer: MEDICARE

## 2023-02-03 DIAGNOSIS — E11.9 CONTROLLED TYPE 2 DIABETES MELLITUS WITHOUT COMPLICATION, WITH LONG-TERM CURRENT USE OF INSULIN: Primary | ICD-10-CM

## 2023-02-03 DIAGNOSIS — Z79.4 CONTROLLED TYPE 2 DIABETES MELLITUS WITHOUT COMPLICATION, WITH LONG-TERM CURRENT USE OF INSULIN: Primary | ICD-10-CM

## 2023-02-03 RX ORDER — DEXTROSE 4 G
1 TABLET,CHEWABLE ORAL 2 TIMES DAILY
COMMUNITY
End: 2023-02-03 | Stop reason: SDUPTHER

## 2023-02-03 RX ORDER — DEXTROSE 4 G
1 TABLET,CHEWABLE ORAL 2 TIMES DAILY
Qty: 1 EACH | Refills: 1 | Status: SHIPPED | OUTPATIENT
Start: 2023-02-03

## 2023-02-03 RX ORDER — LANCETS 26 GAUGE
1 EACH MISCELLANEOUS 2 TIMES DAILY
Qty: 1 EACH | Refills: 4 | Status: SHIPPED | OUTPATIENT
Start: 2023-02-03 | End: 2024-02-03

## 2023-02-03 NOTE — TELEPHONE ENCOUNTER
No new care gaps identified.  [facility] Embedded Care Gaps. Reference number: [id number]. 2/03/2023   9:25:22 AM CST

## 2023-02-04 NOTE — DISCHARGE SUMMARY
Pre Op Diagnosis: elevated LFTs     Post Op Diagnosis: same     Procedure:  Liver core biopsy     Procedure performed by: Claudia MARES, Dayo MENDES     Written Informed Consent Obtained: Yes     Specimen Removed:  yes, liver (type of tissue to be determined by lab analysis)     Estimated Blood Loss:  minimal    Moderate Sedation: yes     The patient tolerated the procedure well and there were no complications.      Sterile technique was performed in the RUQ, lidocaine was used as a local anesthetic.  Multiple samples taken percutaneously from the liver core.  Pt tolerated the procedure well without immediate complications.  Please see radiologist report for details. F/u with PCP and/or ordering physician.    -Patient sustained multiple mandibular fractures after a mechanical fall   -OMFS planning on ORIF of mandibular fractures in the OR  -RCRI 0 for low risk procedure  -mets >4  -F/U EKG if Qtc, MA interval are normal, and no pathologic Q waves or ST elevations patient is medically optimized. If abnormalities noted please reach    - psych to management home meds

## 2023-02-14 ENCOUNTER — PATIENT MESSAGE (OUTPATIENT)
Dept: INTERNAL MEDICINE | Facility: CLINIC | Age: 73
End: 2023-02-14
Payer: MEDICARE

## 2023-02-14 DIAGNOSIS — G47.00 INSOMNIA, UNSPECIFIED TYPE: ICD-10-CM

## 2023-02-15 ENCOUNTER — PATIENT MESSAGE (OUTPATIENT)
Dept: INTERNAL MEDICINE | Facility: CLINIC | Age: 73
End: 2023-02-15
Payer: MEDICARE

## 2023-02-15 DIAGNOSIS — I15.9 SECONDARY HYPERTENSION: ICD-10-CM

## 2023-02-15 RX ORDER — LOSARTAN POTASSIUM 25 MG/1
TABLET ORAL
Qty: 90 TABLET | Refills: 0 | OUTPATIENT
Start: 2023-02-15

## 2023-02-15 RX ORDER — LORAZEPAM 1 MG/1
1 TABLET ORAL 2 TIMES DAILY
Qty: 60 TABLET | Refills: 0 | Status: SHIPPED | OUTPATIENT
Start: 2023-02-15 | End: 2023-03-13 | Stop reason: SDUPTHER

## 2023-02-15 RX ORDER — LOSARTAN POTASSIUM 25 MG/1
25 TABLET ORAL DAILY
Qty: 90 TABLET | Refills: 1 | Status: SHIPPED | OUTPATIENT
Start: 2023-02-15 | End: 2023-07-10

## 2023-02-15 RX ORDER — OMEPRAZOLE 20 MG/1
CAPSULE, DELAYED RELEASE ORAL
Qty: 90 CAPSULE | Refills: 0 | OUTPATIENT
Start: 2023-02-15

## 2023-02-15 RX ORDER — METOPROLOL SUCCINATE 50 MG/1
50 TABLET, EXTENDED RELEASE ORAL DAILY
Qty: 90 TABLET | Refills: 3 | Status: SHIPPED | OUTPATIENT
Start: 2023-02-15 | End: 2023-12-05

## 2023-02-15 RX ORDER — ESZOPICLONE 3 MG/1
TABLET, FILM COATED ORAL
Qty: 30 TABLET | Refills: 0 | Status: SHIPPED | OUTPATIENT
Start: 2023-02-15 | End: 2023-05-23

## 2023-02-15 RX ORDER — OMEPRAZOLE 20 MG/1
20 CAPSULE, DELAYED RELEASE ORAL DAILY
Qty: 90 CAPSULE | Refills: 3 | Status: SHIPPED | OUTPATIENT
Start: 2023-02-15 | End: 2023-12-05

## 2023-02-15 NOTE — TELEPHONE ENCOUNTER
No new care gaps identified.  [facility] Embedded Care Gaps. Reference number: [id number]. 2/15/2023   7:23:51 AM CST

## 2023-02-15 NOTE — TELEPHONE ENCOUNTER
No new care gaps identified.  [facility] Embedded Care Gaps. Reference number: [id number]. 2/15/2023   9:55:32 AM CST

## 2023-02-15 NOTE — TELEPHONE ENCOUNTER
Refill Decision Note   Cassandra Campos  is requesting a refill authorization.  Brief Assessment and Rationale for Refill:  Quick Discontinue     Medication Therapy Plan:      Pharmacy is requesting new scripts for the following medications without required information, (sig/ frequency/qty/etc)      Medication Reconciliation Completed: No     Comments: Pharmacies have been requesting medications for patients without required information, (sig, frequency, qty, etc.). In addition, requests are sent for medication(s) pt. are currently not taking, and medications patients have never taken.    We have spoken to the pharmacies about these request types and advised their teams previously that we are unable to assess these New Script requests and require all details for these requests. This is a known issue and has been reported.     Note composed:9:09 AM 02/15/2023

## 2023-02-15 NOTE — TELEPHONE ENCOUNTER
Refill Decision Note   Cassandra Campos  is requesting a refill authorization.  Brief Assessment and Rationale for Refill:  Approve     Medication Therapy Plan:  Rx sent to patient's requested pharmacy. Toprol - PCP previously prescribed rx.    Medication Reconciliation Completed: No   Comments:     No Care Gaps recommended.     Note composed:12:08 PM 02/15/2023

## 2023-02-15 NOTE — TELEPHONE ENCOUNTER
No new care gaps identified.  [facility] Embedded Care Gaps. Reference number: [id number]. 2/15/2023   12:03:30 PM CST

## 2023-02-15 NOTE — TELEPHONE ENCOUNTER
No new care gaps identified.  [facility] Embedded Care Gaps. Reference number: [id number]. 2/15/2023   7:57:28 AM CST

## 2023-02-27 ENCOUNTER — OFFICE VISIT (OUTPATIENT)
Dept: INTERNAL MEDICINE | Facility: CLINIC | Age: 73
End: 2023-02-27
Payer: MEDICARE

## 2023-02-27 VITALS
SYSTOLIC BLOOD PRESSURE: 130 MMHG | BODY MASS INDEX: 35.86 KG/M2 | HEIGHT: 63 IN | TEMPERATURE: 98 F | HEART RATE: 98 BPM | OXYGEN SATURATION: 95 % | WEIGHT: 202.38 LBS | DIASTOLIC BLOOD PRESSURE: 76 MMHG

## 2023-02-27 DIAGNOSIS — E11.9 CONTROLLED TYPE 2 DIABETES MELLITUS WITHOUT COMPLICATION, WITHOUT LONG-TERM CURRENT USE OF INSULIN: ICD-10-CM

## 2023-02-27 DIAGNOSIS — E66.01 SEVERE OBESITY (BMI 35.0-39.9) WITH COMORBIDITY: ICD-10-CM

## 2023-02-27 DIAGNOSIS — Z12.31 ENCOUNTER FOR SCREENING MAMMOGRAM FOR MALIGNANT NEOPLASM OF BREAST: ICD-10-CM

## 2023-02-27 DIAGNOSIS — I47.10 PAROXYSMAL SVT (SUPRAVENTRICULAR TACHYCARDIA): ICD-10-CM

## 2023-02-27 DIAGNOSIS — J84.10 CALCIFIED GRANULOMA OF LUNG: ICD-10-CM

## 2023-02-27 DIAGNOSIS — E78.5 HYPERLIPIDEMIA ASSOCIATED WITH TYPE 2 DIABETES MELLITUS: ICD-10-CM

## 2023-02-27 DIAGNOSIS — G47.00 INSOMNIA, UNSPECIFIED TYPE: ICD-10-CM

## 2023-02-27 DIAGNOSIS — I10 PRIMARY HYPERTENSION: Primary | ICD-10-CM

## 2023-02-27 DIAGNOSIS — Z12.39 BREAST SCREENING: ICD-10-CM

## 2023-02-27 DIAGNOSIS — E11.69 HYPERLIPIDEMIA ASSOCIATED WITH TYPE 2 DIABETES MELLITUS: ICD-10-CM

## 2023-02-27 DIAGNOSIS — I70.0 AORTIC ATHEROSCLEROSIS: ICD-10-CM

## 2023-02-27 PROBLEM — F14.11 HISTORY OF COCAINE ABUSE: Status: ACTIVE | Noted: 2023-02-27

## 2023-02-27 PROCEDURE — 3008F BODY MASS INDEX DOCD: CPT | Mod: CPTII,S$GLB,, | Performed by: FAMILY MEDICINE

## 2023-02-27 PROCEDURE — 3008F PR BODY MASS INDEX (BMI) DOCUMENTED: ICD-10-PCS | Mod: CPTII,S$GLB,, | Performed by: FAMILY MEDICINE

## 2023-02-27 PROCEDURE — 1159F PR MEDICATION LIST DOCUMENTED IN MEDICAL RECORD: ICD-10-PCS | Mod: CPTII,S$GLB,, | Performed by: FAMILY MEDICINE

## 2023-02-27 PROCEDURE — 99999 PR PBB SHADOW E&M-EST. PATIENT-LVL IV: CPT | Mod: PBBFAC,,, | Performed by: FAMILY MEDICINE

## 2023-02-27 PROCEDURE — 1101F PR PT FALLS ASSESS DOC 0-1 FALLS W/OUT INJ PAST YR: ICD-10-PCS | Mod: CPTII,S$GLB,, | Performed by: FAMILY MEDICINE

## 2023-02-27 PROCEDURE — 99214 PR OFFICE/OUTPT VISIT, EST, LEVL IV, 30-39 MIN: ICD-10-PCS | Mod: S$GLB,,, | Performed by: FAMILY MEDICINE

## 2023-02-27 PROCEDURE — 3288F PR FALLS RISK ASSESSMENT DOCUMENTED: ICD-10-PCS | Mod: CPTII,S$GLB,, | Performed by: FAMILY MEDICINE

## 2023-02-27 PROCEDURE — 1101F PT FALLS ASSESS-DOCD LE1/YR: CPT | Mod: CPTII,S$GLB,, | Performed by: FAMILY MEDICINE

## 2023-02-27 PROCEDURE — 3075F PR MOST RECENT SYSTOLIC BLOOD PRESS GE 130-139MM HG: ICD-10-PCS | Mod: CPTII,S$GLB,, | Performed by: FAMILY MEDICINE

## 2023-02-27 PROCEDURE — 3078F PR MOST RECENT DIASTOLIC BLOOD PRESSURE < 80 MM HG: ICD-10-PCS | Mod: CPTII,S$GLB,, | Performed by: FAMILY MEDICINE

## 2023-02-27 PROCEDURE — 1126F PR PAIN SEVERITY QUANTIFIED, NO PAIN PRESENT: ICD-10-PCS | Mod: CPTII,S$GLB,, | Performed by: FAMILY MEDICINE

## 2023-02-27 PROCEDURE — 3075F SYST BP GE 130 - 139MM HG: CPT | Mod: CPTII,S$GLB,, | Performed by: FAMILY MEDICINE

## 2023-02-27 PROCEDURE — 4010F PR ACE/ARB THEARPY RXD/TAKEN: ICD-10-PCS | Mod: CPTII,S$GLB,, | Performed by: FAMILY MEDICINE

## 2023-02-27 PROCEDURE — 3078F DIAST BP <80 MM HG: CPT | Mod: CPTII,S$GLB,, | Performed by: FAMILY MEDICINE

## 2023-02-27 PROCEDURE — 99999 PR PBB SHADOW E&M-EST. PATIENT-LVL IV: ICD-10-PCS | Mod: PBBFAC,,, | Performed by: FAMILY MEDICINE

## 2023-02-27 PROCEDURE — 4010F ACE/ARB THERAPY RXD/TAKEN: CPT | Mod: CPTII,S$GLB,, | Performed by: FAMILY MEDICINE

## 2023-02-27 PROCEDURE — 1126F AMNT PAIN NOTED NONE PRSNT: CPT | Mod: CPTII,S$GLB,, | Performed by: FAMILY MEDICINE

## 2023-02-27 PROCEDURE — 99214 OFFICE O/P EST MOD 30 MIN: CPT | Mod: S$GLB,,, | Performed by: FAMILY MEDICINE

## 2023-02-27 PROCEDURE — 3288F FALL RISK ASSESSMENT DOCD: CPT | Mod: CPTII,S$GLB,, | Performed by: FAMILY MEDICINE

## 2023-02-27 PROCEDURE — 1159F MED LIST DOCD IN RCRD: CPT | Mod: CPTII,S$GLB,, | Performed by: FAMILY MEDICINE

## 2023-02-27 RX ORDER — SEMAGLUTIDE 1.34 MG/ML
0.5 INJECTION, SOLUTION SUBCUTANEOUS
Qty: 3 PEN | Refills: 1 | Status: SHIPPED | OUTPATIENT
Start: 2023-02-27 | End: 2023-03-13

## 2023-02-27 NOTE — PROGRESS NOTES
Subjective:       Patient ID: Cassandra Campos is a 72 y.o. female.    Chief Complaint: Follow-up    Follow-up diabetes BMI 35.85 hyperlipidemia paroxysmal SVT hypertension aortic atherosclerosis anxiety insomnia.  She gained 3 lb of weight since started on Ozempic.  Up-to-date A1c was good.  She would like to increase Ozempic 0.25 and start 0.50.  She denies chest pain palpitations shortness of breath or edema.  Lunesta is not covered on her no insurance.  She is currently on amitriptyline 100 mg a day which is helping with headaches.  She would like something for insomnia.  Approved medications reviewed.  Trazodone is on her list.    Follow-up  Pertinent negatives include no abdominal pain, chest pain, chills, congestion, coughing, fever, headaches or weakness.   Review of Systems   Constitutional:  Negative for chills and fever.   HENT:  Negative for congestion.    Respiratory:  Negative for cough, chest tightness, shortness of breath and wheezing.         History of pulmonary granuloma   Cardiovascular:  Negative for chest pain, palpitations and leg swelling.   Gastrointestinal:  Negative for abdominal distention, abdominal pain and diarrhea.        Diarrhea stopped when metformin was discontinued   Endocrine: Negative for polydipsia and polyuria.   Neurological:  Negative for dizziness, weakness, light-headedness and headaches.   Psychiatric/Behavioral:  Positive for self-injury. The patient is nervous/anxious.         She was seen by ENT for nasal septal perforation with a history of previous cocaine use     Objective:      Physical Exam  Constitutional:       General: She is not in acute distress.     Appearance: She is not ill-appearing or diaphoretic.   Cardiovascular:      Rate and Rhythm: Normal rate and regular rhythm.      Heart sounds: No murmur heard.    No gallop.   Pulmonary:      Effort: Pulmonary effort is normal. No respiratory distress.      Breath sounds: No wheezing, rhonchi or rales.    Abdominal:      General: There is no distension.   Lymphadenopathy:      Cervical: No cervical adenopathy.   Neurological:      Mental Status: She is alert and oriented to person, place, and time.   Psychiatric:         Mood and Affect: Mood normal.         Behavior: Behavior normal.         Thought Content: Thought content normal.         Judgment: Judgment normal.       Lab Visit on 12/15/2022   Component Date Value Ref Range Status    Hemoglobin A1C 12/15/2022 6.0 (H)  4.0 - 5.6 % Final    Estimated Avg Glucose 12/15/2022 126  68 - 131 mg/dL Final    Glucose, Fasting 12/15/2022 121 (H)  70 - 110 mg/dL Final     Assessment:       1. Primary hypertension    2. Aortic atherosclerosis    3. Hyperlipidemia associated with type 2 diabetes mellitus    4. Severe obesity (BMI 35.0-39.9) with comorbidity    5. Paroxysmal SVT (supraventricular tachycardia)    6. Calcified granuloma of lung    7. Controlled type 2 diabetes mellitus without complication, without long-term current use of insulin    8. Breast screening    9. Encounter for screening mammogram for malignant neoplasm of breast    10. Insomnia, unspecified type          Plan:   She has soon due for mammogram which is being ordered.  Will increase Ozempic 0.5 mg per week follow-up in 6 weeks.  BMI elevated.  Diet exercise discussed.  She wants to try trazodone for insomnia.  Will decrease amitriptyline 50 mg at bedtime for 2 weeks and if she is able discontinue will start trazodone.  Blood Pressure controlled      Primary hypertension    Aortic atherosclerosis    Hyperlipidemia associated with type 2 diabetes mellitus    Severe obesity (BMI 35.0-39.9) with comorbidity    Paroxysmal SVT (supraventricular tachycardia)    Calcified granuloma of lung    Controlled type 2 diabetes mellitus without complication, without long-term current use of insulin    Breast screening    Encounter for screening mammogram for malignant neoplasm of breast    Insomnia, unspecified  type

## 2023-03-01 ENCOUNTER — PATIENT MESSAGE (OUTPATIENT)
Dept: INTERNAL MEDICINE | Facility: CLINIC | Age: 73
End: 2023-03-01
Payer: MEDICARE

## 2023-03-13 ENCOUNTER — LAB VISIT (OUTPATIENT)
Dept: LAB | Facility: HOSPITAL | Age: 73
End: 2023-03-13
Attending: INTERNAL MEDICINE
Payer: MEDICARE

## 2023-03-13 ENCOUNTER — OFFICE VISIT (OUTPATIENT)
Dept: INTERNAL MEDICINE | Facility: CLINIC | Age: 73
End: 2023-03-13
Payer: MEDICARE

## 2023-03-13 VITALS
TEMPERATURE: 98 F | OXYGEN SATURATION: 96 % | HEIGHT: 63 IN | HEART RATE: 107 BPM | WEIGHT: 203.94 LBS | BODY MASS INDEX: 36.14 KG/M2 | SYSTOLIC BLOOD PRESSURE: 128 MMHG | DIASTOLIC BLOOD PRESSURE: 76 MMHG

## 2023-03-13 DIAGNOSIS — E66.01 SEVERE OBESITY (BMI 35.0-39.9) WITH COMORBIDITY: ICD-10-CM

## 2023-03-13 DIAGNOSIS — I10 PRIMARY HYPERTENSION: ICD-10-CM

## 2023-03-13 DIAGNOSIS — F41.9 ANXIETY: ICD-10-CM

## 2023-03-13 DIAGNOSIS — E11.9 CONTROLLED TYPE 2 DIABETES MELLITUS WITHOUT COMPLICATION, WITHOUT LONG-TERM CURRENT USE OF INSULIN: Primary | ICD-10-CM

## 2023-03-13 DIAGNOSIS — G47.00 INSOMNIA, UNSPECIFIED TYPE: Chronic | ICD-10-CM

## 2023-03-13 DIAGNOSIS — K75.81 NASH (NONALCOHOLIC STEATOHEPATITIS): ICD-10-CM

## 2023-03-13 LAB
ALBUMIN SERPL BCP-MCNC: 3.7 G/DL (ref 3.5–5.2)
ALP SERPL-CCNC: 98 U/L (ref 55–135)
ALT SERPL W/O P-5'-P-CCNC: 51 U/L (ref 10–44)
ANION GAP SERPL CALC-SCNC: 6 MMOL/L (ref 8–16)
AST SERPL-CCNC: 33 U/L (ref 10–40)
BILIRUB SERPL-MCNC: 0.3 MG/DL (ref 0.1–1)
BUN SERPL-MCNC: 12 MG/DL (ref 8–23)
CALCIUM SERPL-MCNC: 9.9 MG/DL (ref 8.7–10.5)
CHLORIDE SERPL-SCNC: 104 MMOL/L (ref 95–110)
CO2 SERPL-SCNC: 29 MMOL/L (ref 23–29)
CREAT SERPL-MCNC: 0.7 MG/DL (ref 0.5–1.4)
EST. GFR  (NO RACE VARIABLE): >60 ML/MIN/1.73 M^2
GLUCOSE SERPL-MCNC: 96 MG/DL (ref 70–110)
POTASSIUM SERPL-SCNC: 4.4 MMOL/L (ref 3.5–5.1)
PROT SERPL-MCNC: 8.2 G/DL (ref 6–8.4)
SODIUM SERPL-SCNC: 139 MMOL/L (ref 136–145)

## 2023-03-13 PROCEDURE — 1159F PR MEDICATION LIST DOCUMENTED IN MEDICAL RECORD: ICD-10-PCS | Mod: CPTII,S$GLB,, | Performed by: FAMILY MEDICINE

## 2023-03-13 PROCEDURE — 1126F PR PAIN SEVERITY QUANTIFIED, NO PAIN PRESENT: ICD-10-PCS | Mod: CPTII,S$GLB,, | Performed by: FAMILY MEDICINE

## 2023-03-13 PROCEDURE — 99999 PR PBB SHADOW E&M-EST. PATIENT-LVL III: ICD-10-PCS | Mod: PBBFAC,,, | Performed by: FAMILY MEDICINE

## 2023-03-13 PROCEDURE — 80053 COMPREHEN METABOLIC PANEL: CPT | Performed by: INTERNAL MEDICINE

## 2023-03-13 PROCEDURE — 4010F PR ACE/ARB THEARPY RXD/TAKEN: ICD-10-PCS | Mod: CPTII,S$GLB,, | Performed by: FAMILY MEDICINE

## 2023-03-13 PROCEDURE — 3074F PR MOST RECENT SYSTOLIC BLOOD PRESSURE < 130 MM HG: ICD-10-PCS | Mod: CPTII,S$GLB,, | Performed by: FAMILY MEDICINE

## 2023-03-13 PROCEDURE — 3074F SYST BP LT 130 MM HG: CPT | Mod: CPTII,S$GLB,, | Performed by: FAMILY MEDICINE

## 2023-03-13 PROCEDURE — 1101F PT FALLS ASSESS-DOCD LE1/YR: CPT | Mod: CPTII,S$GLB,, | Performed by: FAMILY MEDICINE

## 2023-03-13 PROCEDURE — 1159F MED LIST DOCD IN RCRD: CPT | Mod: CPTII,S$GLB,, | Performed by: FAMILY MEDICINE

## 2023-03-13 PROCEDURE — 3078F PR MOST RECENT DIASTOLIC BLOOD PRESSURE < 80 MM HG: ICD-10-PCS | Mod: CPTII,S$GLB,, | Performed by: FAMILY MEDICINE

## 2023-03-13 PROCEDURE — 3008F BODY MASS INDEX DOCD: CPT | Mod: CPTII,S$GLB,, | Performed by: FAMILY MEDICINE

## 2023-03-13 PROCEDURE — 99999 PR PBB SHADOW E&M-EST. PATIENT-LVL III: CPT | Mod: PBBFAC,,, | Performed by: FAMILY MEDICINE

## 2023-03-13 PROCEDURE — 3288F PR FALLS RISK ASSESSMENT DOCUMENTED: ICD-10-PCS | Mod: CPTII,S$GLB,, | Performed by: FAMILY MEDICINE

## 2023-03-13 PROCEDURE — 36415 COLL VENOUS BLD VENIPUNCTURE: CPT | Performed by: INTERNAL MEDICINE

## 2023-03-13 PROCEDURE — 3008F PR BODY MASS INDEX (BMI) DOCUMENTED: ICD-10-PCS | Mod: CPTII,S$GLB,, | Performed by: FAMILY MEDICINE

## 2023-03-13 PROCEDURE — 3288F FALL RISK ASSESSMENT DOCD: CPT | Mod: CPTII,S$GLB,, | Performed by: FAMILY MEDICINE

## 2023-03-13 PROCEDURE — 99214 PR OFFICE/OUTPT VISIT, EST, LEVL IV, 30-39 MIN: ICD-10-PCS | Mod: S$GLB,,, | Performed by: FAMILY MEDICINE

## 2023-03-13 PROCEDURE — 1126F AMNT PAIN NOTED NONE PRSNT: CPT | Mod: CPTII,S$GLB,, | Performed by: FAMILY MEDICINE

## 2023-03-13 PROCEDURE — 99214 OFFICE O/P EST MOD 30 MIN: CPT | Mod: S$GLB,,, | Performed by: FAMILY MEDICINE

## 2023-03-13 PROCEDURE — 1101F PR PT FALLS ASSESS DOC 0-1 FALLS W/OUT INJ PAST YR: ICD-10-PCS | Mod: CPTII,S$GLB,, | Performed by: FAMILY MEDICINE

## 2023-03-13 PROCEDURE — 3078F DIAST BP <80 MM HG: CPT | Mod: CPTII,S$GLB,, | Performed by: FAMILY MEDICINE

## 2023-03-13 PROCEDURE — 4010F ACE/ARB THERAPY RXD/TAKEN: CPT | Mod: CPTII,S$GLB,, | Performed by: FAMILY MEDICINE

## 2023-03-13 RX ORDER — TRAZODONE HYDROCHLORIDE 50 MG/1
50 TABLET ORAL NIGHTLY
Qty: 30 TABLET | Refills: 5 | Status: SHIPPED | OUTPATIENT
Start: 2023-03-13 | End: 2023-04-11 | Stop reason: SDUPTHER

## 2023-03-13 RX ORDER — LORAZEPAM 1 MG/1
1 TABLET ORAL 2 TIMES DAILY
Qty: 60 TABLET | Refills: 0 | Status: SHIPPED | OUTPATIENT
Start: 2023-03-13 | End: 2023-04-11 | Stop reason: SDUPTHER

## 2023-03-13 RX ORDER — SEMAGLUTIDE 1.34 MG/ML
1 INJECTION, SOLUTION SUBCUTANEOUS
Qty: 3 ML | Refills: 2 | Status: SHIPPED | OUTPATIENT
Start: 2023-03-13 | End: 2023-04-11

## 2023-03-13 NOTE — PROGRESS NOTES
Subjective:       Patient ID: Cassandra Campos is a 72 y.o. female.    Chief Complaint: Follow-up    Follow-up diabetes with a BMI of 36.12.  She is Ozempic 0.5 mg weekly.  She gained 2 lb of weight since last visit.  She reports follow diabetic diet and walking.  She would like to increase the dose of Ozempic.  She denies any side effects such as nausea vomiting.  Additionally she needs refill on lorazepam for cracking anxiety.  She weaned herself off amitriptyline would like to start trazodone for insomnia.  She is also followed by hepatology for elevated liver enzymes with repeat labs scheduled for today.    Follow-up  Pertinent negatives include no abdominal pain, chest pain, chills, coughing, fever, headaches, nausea, vomiting or weakness.   Review of Systems   Constitutional:  Negative for activity change, appetite change, chills, fever and unexpected weight change.   Respiratory:  Negative for cough, chest tightness, shortness of breath and wheezing.    Cardiovascular:  Negative for chest pain, palpitations and leg swelling.   Gastrointestinal:  Negative for abdominal distention, abdominal pain, diarrhea, nausea and vomiting.   Endocrine: Negative for polydipsia and polyuria.   Neurological:  Negative for dizziness, weakness, light-headedness and headaches.     Objective:      Physical Exam  Constitutional:       General: She is not in acute distress.     Appearance: She is not ill-appearing or diaphoretic.   Cardiovascular:      Rate and Rhythm: Normal rate and regular rhythm.      Heart sounds: No murmur heard.    No gallop.   Pulmonary:      Effort: Pulmonary effort is normal. No respiratory distress.      Breath sounds: No wheezing, rhonchi or rales.   Abdominal:      General: There is no distension.      Palpations: Abdomen is soft.   Lymphadenopathy:      Cervical: No cervical adenopathy.   Skin:     General: Skin is warm and dry.      Coloration: Skin is not pale.      Findings: No erythema.    Neurological:      Mental Status: She is alert and oriented to person, place, and time.   Psychiatric:         Mood and Affect: Mood normal.         Behavior: Behavior normal.         Thought Content: Thought content normal.         Judgment: Judgment normal.       Lab Visit on 12/15/2022   Component Date Value Ref Range Status    Hemoglobin A1C 12/15/2022 6.0 (H)  4.0 - 5.6 % Final    Estimated Avg Glucose 12/15/2022 126  68 - 131 mg/dL Final    Glucose, Fasting 12/15/2022 121 (H)  70 - 110 mg/dL Final     Assessment:       1. Controlled type 2 diabetes mellitus without complication, without long-term current use of insulin    2. Primary hypertension    3. Severe obesity (BMI 35.0-39.9) with comorbidity    4. Insomnia, unspecified type    5. Anxiety        Plan:   Blood pressure controlled.  Increase Ozempic 1 mg weekly.  CMP was ordered by hepatology.  Diabetic diet was discussed.  Start trazodone 50 mg at bedtime for insomnia and refill lorazepam.  Follow-up in 1 month.      Controlled type 2 diabetes mellitus without complication, without long-term current use of insulin    Primary hypertension    Severe obesity (BMI 35.0-39.9) with comorbidity    Insomnia, unspecified type    Anxiety    Other orders  -     semaglutide (OZEMPIC) 1 mg/dose (4 mg/3 mL); Inject 1 mg into the skin every 7 days.  Dispense: 3 mL; Refill: 2  -     traZODone (DESYREL) 50 MG tablet; Take 1 tablet (50 mg total) by mouth every evening.  Dispense: 30 tablet; Refill: 5  -     LORazepam (ATIVAN) 1 MG tablet; Take 1 tablet (1 mg total) by mouth 2 (two) times daily.  Dispense: 60 tablet; Refill: 0

## 2023-04-04 ENCOUNTER — HOSPITAL ENCOUNTER (OUTPATIENT)
Dept: RADIOLOGY | Facility: HOSPITAL | Age: 73
Discharge: HOME | End: 2023-04-04
Attending: FAMILY MEDICINE
Payer: MEDICARE

## 2023-04-04 DIAGNOSIS — Z12.39 BREAST SCREENING: ICD-10-CM

## 2023-04-04 DIAGNOSIS — Z12.31 ENCOUNTER FOR SCREENING MAMMOGRAM FOR MALIGNANT NEOPLASM OF BREAST: ICD-10-CM

## 2023-04-04 PROCEDURE — 77067 SCR MAMMO BI INCL CAD: CPT | Mod: TC

## 2023-04-04 PROCEDURE — 77063 BREAST TOMOSYNTHESIS BI: CPT | Mod: 26,,, | Performed by: RADIOLOGY

## 2023-04-04 PROCEDURE — 77067 SCR MAMMO BI INCL CAD: CPT | Mod: 26,,, | Performed by: RADIOLOGY

## 2023-04-04 PROCEDURE — 77067 MAMMO DIGITAL SCREENING BILAT WITH TOMO: ICD-10-PCS | Mod: 26,,, | Performed by: RADIOLOGY

## 2023-04-04 PROCEDURE — 77063 MAMMO DIGITAL SCREENING BILAT WITH TOMO: ICD-10-PCS | Mod: 26,,, | Performed by: RADIOLOGY

## 2023-04-10 DIAGNOSIS — M79.641 RIGHT HAND PAIN: Primary | ICD-10-CM

## 2023-04-11 ENCOUNTER — OFFICE VISIT (OUTPATIENT)
Dept: INTERNAL MEDICINE | Facility: CLINIC | Age: 73
End: 2023-04-11
Payer: MEDICARE

## 2023-04-11 ENCOUNTER — HOSPITAL ENCOUNTER (OUTPATIENT)
Dept: RADIOLOGY | Facility: HOSPITAL | Age: 73
Discharge: HOME | End: 2023-04-11
Attending: ORTHOPAEDIC SURGERY
Payer: MEDICARE

## 2023-04-11 ENCOUNTER — OFFICE VISIT (OUTPATIENT)
Dept: ORTHOPEDICS | Facility: CLINIC | Age: 73
End: 2023-04-11
Payer: MEDICARE

## 2023-04-11 VITALS
DIASTOLIC BLOOD PRESSURE: 72 MMHG | SYSTOLIC BLOOD PRESSURE: 124 MMHG | HEART RATE: 104 BPM | HEIGHT: 63 IN | WEIGHT: 198.19 LBS | TEMPERATURE: 98 F | OXYGEN SATURATION: 97 % | BODY MASS INDEX: 35.12 KG/M2

## 2023-04-11 VITALS — WEIGHT: 203 LBS | BODY MASS INDEX: 35.97 KG/M2 | HEIGHT: 63 IN

## 2023-04-11 DIAGNOSIS — I10 PRIMARY HYPERTENSION: ICD-10-CM

## 2023-04-11 DIAGNOSIS — M65.30 TRIGGER FINGER, ACQUIRED: Primary | ICD-10-CM

## 2023-04-11 DIAGNOSIS — E11.9 CONTROLLED TYPE 2 DIABETES MELLITUS WITHOUT COMPLICATION, WITHOUT LONG-TERM CURRENT USE OF INSULIN: Primary | ICD-10-CM

## 2023-04-11 DIAGNOSIS — G47.00 INSOMNIA, UNSPECIFIED TYPE: ICD-10-CM

## 2023-04-11 DIAGNOSIS — M79.641 RIGHT HAND PAIN: ICD-10-CM

## 2023-04-11 PROCEDURE — 99999 PR PBB SHADOW E&M-EST. PATIENT-LVL III: ICD-10-PCS | Mod: PBBFAC,,, | Performed by: ORTHOPAEDIC SURGERY

## 2023-04-11 PROCEDURE — 1159F MED LIST DOCD IN RCRD: CPT | Mod: CPTII,S$GLB,, | Performed by: ORTHOPAEDIC SURGERY

## 2023-04-11 PROCEDURE — 3288F FALL RISK ASSESSMENT DOCD: CPT | Mod: CPTII,S$GLB,, | Performed by: FAMILY MEDICINE

## 2023-04-11 PROCEDURE — 1101F PT FALLS ASSESS-DOCD LE1/YR: CPT | Mod: CPTII,S$GLB,, | Performed by: FAMILY MEDICINE

## 2023-04-11 PROCEDURE — 3288F PR FALLS RISK ASSESSMENT DOCUMENTED: ICD-10-PCS | Mod: CPTII,S$GLB,, | Performed by: ORTHOPAEDIC SURGERY

## 2023-04-11 PROCEDURE — 20550 TENDON SHEATH: ICD-10-PCS | Mod: RT,S$GLB,, | Performed by: ORTHOPAEDIC SURGERY

## 2023-04-11 PROCEDURE — 3078F DIAST BP <80 MM HG: CPT | Mod: CPTII,S$GLB,, | Performed by: FAMILY MEDICINE

## 2023-04-11 PROCEDURE — 3074F PR MOST RECENT SYSTOLIC BLOOD PRESSURE < 130 MM HG: ICD-10-PCS | Mod: CPTII,S$GLB,, | Performed by: FAMILY MEDICINE

## 2023-04-11 PROCEDURE — 99999 PR PBB SHADOW E&M-EST. PATIENT-LVL III: CPT | Mod: PBBFAC,,, | Performed by: ORTHOPAEDIC SURGERY

## 2023-04-11 PROCEDURE — 4010F PR ACE/ARB THEARPY RXD/TAKEN: ICD-10-PCS | Mod: CPTII,S$GLB,, | Performed by: FAMILY MEDICINE

## 2023-04-11 PROCEDURE — 1101F PT FALLS ASSESS-DOCD LE1/YR: CPT | Mod: CPTII,S$GLB,, | Performed by: ORTHOPAEDIC SURGERY

## 2023-04-11 PROCEDURE — 99213 OFFICE O/P EST LOW 20 MIN: CPT | Mod: 25,S$GLB,, | Performed by: ORTHOPAEDIC SURGERY

## 2023-04-11 PROCEDURE — 1125F AMNT PAIN NOTED PAIN PRSNT: CPT | Mod: CPTII,S$GLB,, | Performed by: ORTHOPAEDIC SURGERY

## 2023-04-11 PROCEDURE — 3008F PR BODY MASS INDEX (BMI) DOCUMENTED: ICD-10-PCS | Mod: CPTII,S$GLB,, | Performed by: ORTHOPAEDIC SURGERY

## 2023-04-11 PROCEDURE — 3074F SYST BP LT 130 MM HG: CPT | Mod: CPTII,S$GLB,, | Performed by: FAMILY MEDICINE

## 2023-04-11 PROCEDURE — 3008F PR BODY MASS INDEX (BMI) DOCUMENTED: ICD-10-PCS | Mod: CPTII,S$GLB,, | Performed by: FAMILY MEDICINE

## 2023-04-11 PROCEDURE — 3008F BODY MASS INDEX DOCD: CPT | Mod: CPTII,S$GLB,, | Performed by: FAMILY MEDICINE

## 2023-04-11 PROCEDURE — 1125F AMNT PAIN NOTED PAIN PRSNT: CPT | Mod: CPTII,S$GLB,, | Performed by: FAMILY MEDICINE

## 2023-04-11 PROCEDURE — 3288F FALL RISK ASSESSMENT DOCD: CPT | Mod: CPTII,S$GLB,, | Performed by: ORTHOPAEDIC SURGERY

## 2023-04-11 PROCEDURE — 99999 PR PBB SHADOW E&M-EST. PATIENT-LVL III: ICD-10-PCS | Mod: PBBFAC,,, | Performed by: FAMILY MEDICINE

## 2023-04-11 PROCEDURE — 4010F ACE/ARB THERAPY RXD/TAKEN: CPT | Mod: CPTII,S$GLB,, | Performed by: FAMILY MEDICINE

## 2023-04-11 PROCEDURE — 99214 OFFICE O/P EST MOD 30 MIN: CPT | Mod: S$GLB,,, | Performed by: FAMILY MEDICINE

## 2023-04-11 PROCEDURE — 1101F PR PT FALLS ASSESS DOC 0-1 FALLS W/OUT INJ PAST YR: ICD-10-PCS | Mod: CPTII,S$GLB,, | Performed by: FAMILY MEDICINE

## 2023-04-11 PROCEDURE — 4010F PR ACE/ARB THEARPY RXD/TAKEN: ICD-10-PCS | Mod: CPTII,S$GLB,, | Performed by: ORTHOPAEDIC SURGERY

## 2023-04-11 PROCEDURE — 1160F PR REVIEW ALL MEDS BY PRESCRIBER/CLIN PHARMACIST DOCUMENTED: ICD-10-PCS | Mod: CPTII,S$GLB,, | Performed by: ORTHOPAEDIC SURGERY

## 2023-04-11 PROCEDURE — 73130 XR HAND COMPLETE 3 VIEW RIGHT: ICD-10-PCS | Mod: 26,RT,, | Performed by: RADIOLOGY

## 2023-04-11 PROCEDURE — 1125F PR PAIN SEVERITY QUANTIFIED, PAIN PRESENT: ICD-10-PCS | Mod: CPTII,S$GLB,, | Performed by: FAMILY MEDICINE

## 2023-04-11 PROCEDURE — 1159F MED LIST DOCD IN RCRD: CPT | Mod: CPTII,S$GLB,, | Performed by: FAMILY MEDICINE

## 2023-04-11 PROCEDURE — 4010F ACE/ARB THERAPY RXD/TAKEN: CPT | Mod: CPTII,S$GLB,, | Performed by: ORTHOPAEDIC SURGERY

## 2023-04-11 PROCEDURE — 3008F BODY MASS INDEX DOCD: CPT | Mod: CPTII,S$GLB,, | Performed by: ORTHOPAEDIC SURGERY

## 2023-04-11 PROCEDURE — 99214 PR OFFICE/OUTPT VISIT, EST, LEVL IV, 30-39 MIN: ICD-10-PCS | Mod: S$GLB,,, | Performed by: FAMILY MEDICINE

## 2023-04-11 PROCEDURE — 99213 PR OFFICE/OUTPT VISIT, EST, LEVL III, 20-29 MIN: ICD-10-PCS | Mod: 25,S$GLB,, | Performed by: ORTHOPAEDIC SURGERY

## 2023-04-11 PROCEDURE — 73130 X-RAY EXAM OF HAND: CPT | Mod: 26,RT,, | Performed by: RADIOLOGY

## 2023-04-11 PROCEDURE — 1159F PR MEDICATION LIST DOCUMENTED IN MEDICAL RECORD: ICD-10-PCS | Mod: CPTII,S$GLB,, | Performed by: ORTHOPAEDIC SURGERY

## 2023-04-11 PROCEDURE — 73130 X-RAY EXAM OF HAND: CPT | Mod: TC,RT

## 2023-04-11 PROCEDURE — 3288F PR FALLS RISK ASSESSMENT DOCUMENTED: ICD-10-PCS | Mod: CPTII,S$GLB,, | Performed by: FAMILY MEDICINE

## 2023-04-11 PROCEDURE — 99999 PR PBB SHADOW E&M-EST. PATIENT-LVL III: CPT | Mod: PBBFAC,,, | Performed by: FAMILY MEDICINE

## 2023-04-11 PROCEDURE — 1125F PR PAIN SEVERITY QUANTIFIED, PAIN PRESENT: ICD-10-PCS | Mod: CPTII,S$GLB,, | Performed by: ORTHOPAEDIC SURGERY

## 2023-04-11 PROCEDURE — 3078F PR MOST RECENT DIASTOLIC BLOOD PRESSURE < 80 MM HG: ICD-10-PCS | Mod: CPTII,S$GLB,, | Performed by: FAMILY MEDICINE

## 2023-04-11 PROCEDURE — 20550 NJX 1 TENDON SHEATH/LIGAMENT: CPT | Mod: RT,S$GLB,, | Performed by: ORTHOPAEDIC SURGERY

## 2023-04-11 PROCEDURE — 1159F PR MEDICATION LIST DOCUMENTED IN MEDICAL RECORD: ICD-10-PCS | Mod: CPTII,S$GLB,, | Performed by: FAMILY MEDICINE

## 2023-04-11 PROCEDURE — 1160F RVW MEDS BY RX/DR IN RCRD: CPT | Mod: CPTII,S$GLB,, | Performed by: ORTHOPAEDIC SURGERY

## 2023-04-11 PROCEDURE — 1101F PR PT FALLS ASSESS DOC 0-1 FALLS W/OUT INJ PAST YR: ICD-10-PCS | Mod: CPTII,S$GLB,, | Performed by: ORTHOPAEDIC SURGERY

## 2023-04-11 RX ORDER — TRIAMCINOLONE ACETONIDE 40 MG/ML
40 INJECTION, SUSPENSION INTRA-ARTICULAR; INTRAMUSCULAR
Status: DISCONTINUED | OUTPATIENT
Start: 2023-04-11 | End: 2023-04-11 | Stop reason: HOSPADM

## 2023-04-11 RX ORDER — LORAZEPAM 1 MG/1
1 TABLET ORAL 2 TIMES DAILY
Qty: 60 TABLET | Refills: 2 | Status: SHIPPED | OUTPATIENT
Start: 2023-04-11 | End: 2023-07-06

## 2023-04-11 RX ORDER — TRAZODONE HYDROCHLORIDE 100 MG/1
100 TABLET ORAL NIGHTLY
Qty: 30 TABLET | Refills: 5 | Status: SHIPPED | OUTPATIENT
Start: 2023-04-11 | End: 2023-05-23

## 2023-04-11 RX ORDER — SEMAGLUTIDE 2.68 MG/ML
2 INJECTION, SOLUTION SUBCUTANEOUS
Qty: 3 ML | Refills: 2 | Status: SHIPPED | OUTPATIENT
Start: 2023-04-11 | End: 2023-05-23 | Stop reason: SDUPTHER

## 2023-04-11 RX ORDER — SEMAGLUTIDE 1.34 MG/ML
1.5 INJECTION, SOLUTION SUBCUTANEOUS
Qty: 6 ML | Refills: 2 | Status: SHIPPED | OUTPATIENT
Start: 2023-04-11 | End: 2023-04-11

## 2023-04-11 RX ADMIN — TRIAMCINOLONE ACETONIDE 40 MG: 40 INJECTION, SUSPENSION INTRA-ARTICULAR; INTRAMUSCULAR at 11:04

## 2023-04-11 NOTE — PROCEDURES
Tendon Sheath    Date/Time: 4/11/2023 11:45 AM  Performed by: Jaime Oswald MD  Authorized by: Jaime Oswald MD     Consent Done?:  Yes (Verbal)  Indications:  Pain  Timeout: prior to procedure the correct patient, procedure, and site was verified    Prep: patient was prepped and draped in usual sterile fashion      Local anesthesia used?: Yes    Local anesthetic:  Lidocaine 2% without epinephrine  Anesthetic total (ml):  0.5    Location:  Long finger  Site:  R long flexor tendon sheath  Ultrasonic guidance for needle placement?: No    Needle size:  25 G  Approach:  Volar  Medications:  40 mg triamcinolone acetonide 40 mg/mL

## 2023-04-11 NOTE — PROCEDURES
Tendon Sheath    Date/Time: 4/11/2023 11:45 AM  Performed by: Jaime Oswald MD  Authorized by: Jaime Oswald MD     Consent Done?:  Yes (Verbal)  Indications:  Pain  Timeout: prior to procedure the correct patient, procedure, and site was verified    Prep: patient was prepped and draped in usual sterile fashion      Local anesthesia used?: Yes    Local anesthetic:  Lidocaine 2% without epinephrine  Anesthetic total (ml):  0.5    Location:  Ring finger  Site:  R ring flexor tendon sheath  Ultrasonic guidance for needle placement?: No    Needle size:  25 G  Approach:  Volar  Medications:  40 mg triamcinolone acetonide 40 mg/mL

## 2023-04-11 NOTE — PROGRESS NOTES
Subjective:       Patient ID: Cassandra Campos is a 72 y.o. female.    Chief Complaint: Follow-up and Diabetes    Follow-up hypertension diabetes BMI 35.11 insomnia chronic anxiety.  She lost about 5 lb of weight since last visit.  She is currently on Ozempic 1.0 mg weekly.  She is denied any nausea vomiting.  She is monitor blood sugars at home and mostly been running normal range.  She denies hypoglycemic symptoms.  Trazodone 50 mg at bedtime is not helping her sleep.  She would like to increase the dose.    Follow-up  Pertinent negatives include no abdominal pain, chest pain, coughing or nausea.   Diabetes  Hypoglycemia symptoms include nervousness/anxiousness. Pertinent negatives for diabetes include no chest pain, no polydipsia and no polyuria.   Review of Systems   Constitutional:  Negative for activity change, appetite change and unexpected weight change.   Respiratory:  Negative for cough, chest tightness, shortness of breath and wheezing.    Cardiovascular:  Negative for chest pain, palpitations and leg swelling.   Gastrointestinal:  Negative for abdominal distention, abdominal pain, diarrhea and nausea.   Endocrine: Negative for polydipsia and polyuria.   Psychiatric/Behavioral:  Positive for sleep disturbance. The patient is nervous/anxious.      Objective:      Physical Exam  Constitutional:       General: She is not in acute distress.     Appearance: She is not ill-appearing or diaphoretic.   Cardiovascular:      Rate and Rhythm: Normal rate and regular rhythm.      Heart sounds: No murmur heard.    No gallop.   Pulmonary:      Effort: Pulmonary effort is normal. No respiratory distress.      Breath sounds: No wheezing, rhonchi or rales.   Abdominal:      General: There is no distension.   Lymphadenopathy:      Cervical: No cervical adenopathy.   Neurological:      Mental Status: She is alert and oriented to person, place, and time.   Psychiatric:         Mood and Affect: Mood normal.          Behavior: Behavior normal.         Thought Content: Thought content normal.         Judgment: Judgment normal.       Lab Visit on 03/13/2023   Component Date Value Ref Range Status    Sodium 03/13/2023 139  136 - 145 mmol/L Final    Potassium 03/13/2023 4.4  3.5 - 5.1 mmol/L Final    Chloride 03/13/2023 104  95 - 110 mmol/L Final    CO2 03/13/2023 29  23 - 29 mmol/L Final    Glucose 03/13/2023 96  70 - 110 mg/dL Final    BUN 03/13/2023 12  8 - 23 mg/dL Final    Creatinine 03/13/2023 0.7  0.5 - 1.4 mg/dL Final    Calcium 03/13/2023 9.9  8.7 - 10.5 mg/dL Final    Total Protein 03/13/2023 8.2  6.0 - 8.4 g/dL Final    Albumin 03/13/2023 3.7  3.5 - 5.2 g/dL Final    Total Bilirubin 03/13/2023 0.3  0.1 - 1.0 mg/dL Final    Alkaline Phosphatase 03/13/2023 98  55 - 135 U/L Final    AST 03/13/2023 33  10 - 40 U/L Final    ALT 03/13/2023 51 (H)  10 - 44 U/L Final    Anion Gap 03/13/2023 6 (L)  8 - 16 mmol/L Final    eGFR 03/13/2023 >60.0  >60 mL/min/1.73 m^2 Final     Assessment:       1. Controlled type 2 diabetes mellitus without complication, without long-term current use of insulin    2. Insomnia, unspecified type    3. BMI 35.0-35.9,adult    4. Primary hypertension        Plan:   A1c ordered will increase Ozempic to 1.5 mg weekly.  Increase trazodone 100 mg nightly refill lorazepam follow-up in 6 weeks diet discussed      Controlled type 2 diabetes mellitus without complication, without long-term current use of insulin  -     Hemoglobin A1C; Future; Expected date: 04/11/2023    Insomnia, unspecified type  -     traZODone (DESYREL) 100 MG tablet; Take 1 tablet (100 mg total) by mouth every evening.  Dispense: 30 tablet; Refill: 5    BMI 35.0-35.9,adult    Primary hypertension    Other orders  -     semaglutide (OZEMPIC) 1 mg/dose (4 mg/3 mL); Inject 1.5 mg into the skin every 7 days.  Dispense: 6 mL; Refill: 2  -     LORazepam (ATIVAN) 1 MG tablet; Take 1 tablet (1 mg total) by mouth 2 (two) times daily.  Dispense: 60  tablet; Refill: 2

## 2023-04-12 ENCOUNTER — PATIENT MESSAGE (OUTPATIENT)
Dept: INTERNAL MEDICINE | Facility: CLINIC | Age: 73
End: 2023-04-12
Payer: MEDICARE

## 2023-05-23 ENCOUNTER — OFFICE VISIT (OUTPATIENT)
Dept: INTERNAL MEDICINE | Facility: CLINIC | Age: 73
End: 2023-05-23
Payer: MEDICARE

## 2023-05-23 VITALS
DIASTOLIC BLOOD PRESSURE: 72 MMHG | HEART RATE: 103 BPM | TEMPERATURE: 98 F | SYSTOLIC BLOOD PRESSURE: 128 MMHG | HEIGHT: 63 IN | WEIGHT: 194.69 LBS | BODY MASS INDEX: 34.5 KG/M2 | OXYGEN SATURATION: 95 %

## 2023-05-23 DIAGNOSIS — G47.00 INSOMNIA, UNSPECIFIED TYPE: ICD-10-CM

## 2023-05-23 DIAGNOSIS — E11.9 CONTROLLED TYPE 2 DIABETES MELLITUS WITHOUT COMPLICATION, WITHOUT LONG-TERM CURRENT USE OF INSULIN: Primary | ICD-10-CM

## 2023-05-23 DIAGNOSIS — I10 PRIMARY HYPERTENSION: ICD-10-CM

## 2023-05-23 PROCEDURE — 1101F PT FALLS ASSESS-DOCD LE1/YR: CPT | Mod: CPTII,S$GLB,, | Performed by: FAMILY MEDICINE

## 2023-05-23 PROCEDURE — 99214 PR OFFICE/OUTPT VISIT, EST, LEVL IV, 30-39 MIN: ICD-10-PCS | Mod: S$GLB,,, | Performed by: FAMILY MEDICINE

## 2023-05-23 PROCEDURE — 3008F BODY MASS INDEX DOCD: CPT | Mod: CPTII,S$GLB,, | Performed by: FAMILY MEDICINE

## 2023-05-23 PROCEDURE — 99214 OFFICE O/P EST MOD 30 MIN: CPT | Mod: S$GLB,,, | Performed by: FAMILY MEDICINE

## 2023-05-23 PROCEDURE — 3074F PR MOST RECENT SYSTOLIC BLOOD PRESSURE < 130 MM HG: ICD-10-PCS | Mod: CPTII,S$GLB,, | Performed by: FAMILY MEDICINE

## 2023-05-23 PROCEDURE — 3078F PR MOST RECENT DIASTOLIC BLOOD PRESSURE < 80 MM HG: ICD-10-PCS | Mod: CPTII,S$GLB,, | Performed by: FAMILY MEDICINE

## 2023-05-23 PROCEDURE — 3008F PR BODY MASS INDEX (BMI) DOCUMENTED: ICD-10-PCS | Mod: CPTII,S$GLB,, | Performed by: FAMILY MEDICINE

## 2023-05-23 PROCEDURE — 3288F PR FALLS RISK ASSESSMENT DOCUMENTED: ICD-10-PCS | Mod: CPTII,S$GLB,, | Performed by: FAMILY MEDICINE

## 2023-05-23 PROCEDURE — 99999 PR PBB SHADOW E&M-EST. PATIENT-LVL IV: ICD-10-PCS | Mod: PBBFAC,,, | Performed by: FAMILY MEDICINE

## 2023-05-23 PROCEDURE — 3074F SYST BP LT 130 MM HG: CPT | Mod: CPTII,S$GLB,, | Performed by: FAMILY MEDICINE

## 2023-05-23 PROCEDURE — 1126F PR PAIN SEVERITY QUANTIFIED, NO PAIN PRESENT: ICD-10-PCS | Mod: CPTII,S$GLB,, | Performed by: FAMILY MEDICINE

## 2023-05-23 PROCEDURE — 4010F ACE/ARB THERAPY RXD/TAKEN: CPT | Mod: CPTII,S$GLB,, | Performed by: FAMILY MEDICINE

## 2023-05-23 PROCEDURE — 1126F AMNT PAIN NOTED NONE PRSNT: CPT | Mod: CPTII,S$GLB,, | Performed by: FAMILY MEDICINE

## 2023-05-23 PROCEDURE — 3288F FALL RISK ASSESSMENT DOCD: CPT | Mod: CPTII,S$GLB,, | Performed by: FAMILY MEDICINE

## 2023-05-23 PROCEDURE — 3044F HG A1C LEVEL LT 7.0%: CPT | Mod: CPTII,S$GLB,, | Performed by: FAMILY MEDICINE

## 2023-05-23 PROCEDURE — 1159F PR MEDICATION LIST DOCUMENTED IN MEDICAL RECORD: ICD-10-PCS | Mod: CPTII,S$GLB,, | Performed by: FAMILY MEDICINE

## 2023-05-23 PROCEDURE — 4010F PR ACE/ARB THEARPY RXD/TAKEN: ICD-10-PCS | Mod: CPTII,S$GLB,, | Performed by: FAMILY MEDICINE

## 2023-05-23 PROCEDURE — 3044F PR MOST RECENT HEMOGLOBIN A1C LEVEL <7.0%: ICD-10-PCS | Mod: CPTII,S$GLB,, | Performed by: FAMILY MEDICINE

## 2023-05-23 PROCEDURE — 1101F PR PT FALLS ASSESS DOC 0-1 FALLS W/OUT INJ PAST YR: ICD-10-PCS | Mod: CPTII,S$GLB,, | Performed by: FAMILY MEDICINE

## 2023-05-23 PROCEDURE — 1159F MED LIST DOCD IN RCRD: CPT | Mod: CPTII,S$GLB,, | Performed by: FAMILY MEDICINE

## 2023-05-23 PROCEDURE — 3078F DIAST BP <80 MM HG: CPT | Mod: CPTII,S$GLB,, | Performed by: FAMILY MEDICINE

## 2023-05-23 PROCEDURE — 99999 PR PBB SHADOW E&M-EST. PATIENT-LVL IV: CPT | Mod: PBBFAC,,, | Performed by: FAMILY MEDICINE

## 2023-05-23 RX ORDER — MIRTAZAPINE 15 MG/1
15 TABLET, FILM COATED ORAL NIGHTLY
Qty: 30 TABLET | Refills: 2 | Status: SHIPPED | OUTPATIENT
Start: 2023-05-23 | End: 2023-06-13

## 2023-05-23 RX ORDER — SEMAGLUTIDE 2.68 MG/ML
2 INJECTION, SOLUTION SUBCUTANEOUS
Qty: 3 ML | Refills: 2 | Status: SHIPPED | OUTPATIENT
Start: 2023-05-23 | End: 2023-08-28 | Stop reason: SDUPTHER

## 2023-05-23 NOTE — PROGRESS NOTES
Subjective:       Patient ID: Cassandra Campos is a 72 y.o. female.    Chief Complaint: Follow-up    Follow-up diabetes ozempic therapy and insomnia.  Follow-up hypertension.  Her weight has decreased additional 4 lb over the last 6 weeks.  Initially her weight was 203 than 198 now 194 she has no side effects from medication.  She denies chest pain palpitations shortness of breath edema.  Trazodone is not apnea.  She use Lunesta will try Remeron.  She is also taking lorazepam twice a day.  Medical history includes past cocaine abuse.    Follow-up  Pertinent negatives include no abdominal pain, chest pain, chills, coughing, fever or nausea.   Review of Systems   Constitutional:  Negative for activity change, appetite change, chills, fever and unexpected weight change.   Respiratory:  Negative for cough, chest tightness, shortness of breath and wheezing.    Cardiovascular:  Negative for chest pain, palpitations and leg swelling.   Gastrointestinal:  Negative for abdominal distention, abdominal pain, diarrhea and nausea.   Endocrine: Negative for polyphagia and polyuria.   Psychiatric/Behavioral:  Positive for sleep disturbance.      Objective:      Physical Exam  Constitutional:       General: She is not in acute distress.     Appearance: She is not ill-appearing or diaphoretic.   Cardiovascular:      Rate and Rhythm: Normal rate and regular rhythm.      Heart sounds: No murmur heard.    No gallop.   Pulmonary:      Effort: Pulmonary effort is normal. No respiratory distress.      Breath sounds: No wheezing, rhonchi or rales.   Abdominal:      General: There is no distension.   Lymphadenopathy:      Cervical: No cervical adenopathy.   Skin:     General: Skin is warm and dry.   Neurological:      Mental Status: She is alert and oriented to person, place, and time.   Psychiatric:         Mood and Affect: Mood normal.         Behavior: Behavior normal.         Thought Content: Thought content normal.         Judgment:  Judgment normal.       Lab Visit on 04/11/2023   Component Date Value Ref Range Status    Hemoglobin A1C 04/11/2023 5.6  4.0 - 5.6 % Final    Estimated Avg Glucose 04/11/2023 114  68 - 131 mg/dL Final     Assessment:       1. Controlled type 2 diabetes mellitus without complication, without long-term current use of insulin    2. BMI 34.0-34.9,adult    3. Insomnia, unspecified type    4. Primary hypertension        Plan:     Blood pressure controlled up-to-date A1c is normal  cont ozempic 2 mg weekly.  Remeron for sleep.  Follow-up in 6 weeks.    Controlled type 2 diabetes mellitus without complication, without long-term current use of insulin    BMI 34.0-34.9,adult    Insomnia, unspecified type    Primary hypertension    Other orders  -     mirtazapine (REMERON) 15 MG tablet; Take 1 tablet (15 mg total) by mouth every evening.  Dispense: 30 tablet; Refill: 2  -     semaglutide (OZEMPIC) 2 mg/dose (8 mg/3 mL) PnIj; Inject 2 mg into the skin every 7 days.  Dispense: 3 mL; Refill: 2

## 2023-06-10 ENCOUNTER — PATIENT MESSAGE (OUTPATIENT)
Dept: INTERNAL MEDICINE | Facility: CLINIC | Age: 73
End: 2023-06-10
Payer: MEDICARE

## 2023-06-13 RX ORDER — ESZOPICLONE 3 MG/1
3 TABLET, FILM COATED ORAL NIGHTLY
Qty: 90 TABLET | Refills: 0 | Status: SHIPPED | OUTPATIENT
Start: 2023-06-13 | End: 2023-06-20

## 2023-06-14 DIAGNOSIS — E11.9 CONTROLLED TYPE 2 DIABETES MELLITUS WITHOUT COMPLICATION, WITH LONG-TERM CURRENT USE OF INSULIN: ICD-10-CM

## 2023-06-14 DIAGNOSIS — Z79.4 CONTROLLED TYPE 2 DIABETES MELLITUS WITHOUT COMPLICATION, WITH LONG-TERM CURRENT USE OF INSULIN: ICD-10-CM

## 2023-06-14 RX ORDER — BLOOD SUGAR DIAGNOSTIC
STRIP MISCELLANEOUS
Qty: 200 STRIP | Refills: 3 | Status: SHIPPED | OUTPATIENT
Start: 2023-06-14 | End: 2024-02-12 | Stop reason: SDUPTHER

## 2023-06-14 NOTE — TELEPHONE ENCOUNTER
No care due was identified.  Health [facility] Embedded Care Due Messages. Reference number: [id number].   6/14/2023 12:49:00 PM CDT

## 2023-06-14 NOTE — TELEPHONE ENCOUNTER
Refill Decision Note   Cassandra Campos  is requesting a refill authorization.  Brief Assessment and Rationale for Refill:  Approve     Medication Therapy Plan:         Comments:     Note composed:1:09 PM 06/14/2023             Appointments     Last Visit   5/23/2023 Emil Zhang MD   Next Visit   7/26/2023 Emil Zhang MD

## 2023-06-15 ENCOUNTER — PATIENT MESSAGE (OUTPATIENT)
Dept: INTERNAL MEDICINE | Facility: CLINIC | Age: 73
End: 2023-06-15
Payer: MEDICARE

## 2023-06-16 ENCOUNTER — PATIENT MESSAGE (OUTPATIENT)
Dept: INTERNAL MEDICINE | Facility: CLINIC | Age: 73
End: 2023-06-16
Payer: MEDICARE

## 2023-06-16 ENCOUNTER — TELEPHONE (OUTPATIENT)
Dept: INTERNAL MEDICINE | Facility: CLINIC | Age: 73
End: 2023-06-16
Payer: MEDICARE

## 2023-06-20 ENCOUNTER — PATIENT MESSAGE (OUTPATIENT)
Dept: INTERNAL MEDICINE | Facility: CLINIC | Age: 73
End: 2023-06-20
Payer: MEDICARE

## 2023-06-20 RX ORDER — DARIDOREXANT 25 MG/1
1 TABLET, FILM COATED ORAL NIGHTLY
Qty: 90 TABLET | Refills: 1 | Status: SHIPPED | OUTPATIENT
Start: 2023-06-20 | End: 2023-07-26

## 2023-07-05 NOTE — TELEPHONE ENCOUNTER
No care due was identified.  [facility] Embedded Care Due Messages. Reference number: [id number].   7/05/2023 3:27:11 PM CDT

## 2023-07-06 RX ORDER — LORAZEPAM 1 MG/1
TABLET ORAL
Qty: 60 TABLET | Refills: 2 | Status: SHIPPED | OUTPATIENT
Start: 2023-07-06 | End: 2023-09-27

## 2023-07-10 RX ORDER — LOSARTAN POTASSIUM 25 MG/1
TABLET ORAL
Qty: 90 TABLET | Refills: 2 | Status: SHIPPED | OUTPATIENT
Start: 2023-07-10 | End: 2024-03-21

## 2023-07-10 NOTE — TELEPHONE ENCOUNTER
Refill Decision Note   Cassandra Campos  is requesting a refill authorization.  Brief Assessment and Rationale for Refill:  Approve     Medication Therapy Plan:         Comments:     Note composed:11:12 AM 07/10/2023

## 2023-07-10 NOTE — TELEPHONE ENCOUNTER
No care due was identified.  Health [facility] Embedded Care Due Messages. Reference number: [id number].   7/09/2023 9:27:03 PM CDT

## 2023-07-19 DIAGNOSIS — E11.9 CONTROLLED TYPE 2 DIABETES MELLITUS WITHOUT COMPLICATION, WITHOUT LONG-TERM CURRENT USE OF INSULIN: Primary | ICD-10-CM

## 2023-07-19 RX ORDER — BLOOD-GLUCOSE SENSOR
1 EACH MISCELLANEOUS CONTINUOUS
Qty: 3 EACH | Refills: 2 | Status: SHIPPED | OUTPATIENT
Start: 2023-07-19 | End: 2023-12-26

## 2023-07-23 ENCOUNTER — PATIENT MESSAGE (OUTPATIENT)
Dept: INTERNAL MEDICINE | Facility: CLINIC | Age: 73
End: 2023-07-23
Payer: MEDICARE

## 2023-07-26 ENCOUNTER — OFFICE VISIT (OUTPATIENT)
Dept: INTERNAL MEDICINE | Facility: CLINIC | Age: 73
End: 2023-07-26
Payer: MEDICARE

## 2023-07-26 ENCOUNTER — LAB VISIT (OUTPATIENT)
Dept: LAB | Facility: HOSPITAL | Age: 73
End: 2023-07-26
Attending: FAMILY MEDICINE
Payer: MEDICARE

## 2023-07-26 ENCOUNTER — OFFICE VISIT (OUTPATIENT)
Dept: ORTHOPEDICS | Facility: CLINIC | Age: 73
End: 2023-07-26
Payer: MEDICARE

## 2023-07-26 VITALS
WEIGHT: 192.88 LBS | WEIGHT: 194.69 LBS | DIASTOLIC BLOOD PRESSURE: 78 MMHG | BODY MASS INDEX: 34.5 KG/M2 | HEIGHT: 63 IN | HEART RATE: 100 BPM | TEMPERATURE: 98 F | SYSTOLIC BLOOD PRESSURE: 132 MMHG | BODY MASS INDEX: 34.18 KG/M2 | HEIGHT: 63 IN | OXYGEN SATURATION: 96 %

## 2023-07-26 DIAGNOSIS — I10 PRIMARY HYPERTENSION: ICD-10-CM

## 2023-07-26 DIAGNOSIS — E11.9 CONTROLLED TYPE 2 DIABETES MELLITUS WITHOUT COMPLICATION, WITHOUT LONG-TERM CURRENT USE OF INSULIN: ICD-10-CM

## 2023-07-26 DIAGNOSIS — E11.9 CONTROLLED TYPE 2 DIABETES MELLITUS WITHOUT COMPLICATION, WITHOUT LONG-TERM CURRENT USE OF INSULIN: Primary | ICD-10-CM

## 2023-07-26 DIAGNOSIS — M65.30 TRIGGER FINGER, ACQUIRED: Primary | ICD-10-CM

## 2023-07-26 DIAGNOSIS — G47.00 INSOMNIA, UNSPECIFIED TYPE: ICD-10-CM

## 2023-07-26 LAB
ALBUMIN SERPL BCP-MCNC: 3.7 G/DL (ref 3.5–5.2)
ALP SERPL-CCNC: 94 U/L (ref 55–135)
ALT SERPL W/O P-5'-P-CCNC: 61 U/L (ref 10–44)
ANION GAP SERPL CALC-SCNC: 11 MMOL/L (ref 8–16)
AST SERPL-CCNC: 38 U/L (ref 10–40)
BILIRUB SERPL-MCNC: 0.3 MG/DL (ref 0.1–1)
BUN SERPL-MCNC: 12 MG/DL (ref 8–23)
CALCIUM SERPL-MCNC: 10.5 MG/DL (ref 8.7–10.5)
CHLORIDE SERPL-SCNC: 103 MMOL/L (ref 95–110)
CO2 SERPL-SCNC: 24 MMOL/L (ref 23–29)
CREAT SERPL-MCNC: 0.7 MG/DL (ref 0.5–1.4)
EST. GFR  (NO RACE VARIABLE): >60 ML/MIN/1.73 M^2
GLUCOSE SERPL-MCNC: 88 MG/DL (ref 70–110)
POTASSIUM SERPL-SCNC: 4.3 MMOL/L (ref 3.5–5.1)
PROT SERPL-MCNC: 8.7 G/DL (ref 6–8.4)
SODIUM SERPL-SCNC: 138 MMOL/L (ref 136–145)

## 2023-07-26 PROCEDURE — 3288F FALL RISK ASSESSMENT DOCD: CPT | Mod: CPTII,S$GLB,, | Performed by: FAMILY MEDICINE

## 2023-07-26 PROCEDURE — 1126F PR PAIN SEVERITY QUANTIFIED, NO PAIN PRESENT: ICD-10-PCS | Mod: CPTII,S$GLB,, | Performed by: FAMILY MEDICINE

## 2023-07-26 PROCEDURE — 1125F PR PAIN SEVERITY QUANTIFIED, PAIN PRESENT: ICD-10-PCS | Mod: CPTII,S$GLB,, | Performed by: ORTHOPAEDIC SURGERY

## 2023-07-26 PROCEDURE — 99999 PR PBB SHADOW E&M-EST. PATIENT-LVL III: CPT | Mod: PBBFAC,,, | Performed by: ORTHOPAEDIC SURGERY

## 2023-07-26 PROCEDURE — 4010F PR ACE/ARB THEARPY RXD/TAKEN: ICD-10-PCS | Mod: CPTII,S$GLB,, | Performed by: ORTHOPAEDIC SURGERY

## 2023-07-26 PROCEDURE — 99213 OFFICE O/P EST LOW 20 MIN: CPT | Mod: 25,S$GLB,, | Performed by: ORTHOPAEDIC SURGERY

## 2023-07-26 PROCEDURE — 1159F PR MEDICATION LIST DOCUMENTED IN MEDICAL RECORD: ICD-10-PCS | Mod: CPTII,S$GLB,, | Performed by: ORTHOPAEDIC SURGERY

## 2023-07-26 PROCEDURE — 3044F HG A1C LEVEL LT 7.0%: CPT | Mod: CPTII,S$GLB,, | Performed by: FAMILY MEDICINE

## 2023-07-26 PROCEDURE — 36415 COLL VENOUS BLD VENIPUNCTURE: CPT | Performed by: FAMILY MEDICINE

## 2023-07-26 PROCEDURE — 99214 PR OFFICE/OUTPT VISIT, EST, LEVL IV, 30-39 MIN: ICD-10-PCS | Mod: S$GLB,,, | Performed by: FAMILY MEDICINE

## 2023-07-26 PROCEDURE — 1101F PT FALLS ASSESS-DOCD LE1/YR: CPT | Mod: CPTII,S$GLB,, | Performed by: ORTHOPAEDIC SURGERY

## 2023-07-26 PROCEDURE — 3008F BODY MASS INDEX DOCD: CPT | Mod: CPTII,S$GLB,, | Performed by: ORTHOPAEDIC SURGERY

## 2023-07-26 PROCEDURE — 3008F PR BODY MASS INDEX (BMI) DOCUMENTED: ICD-10-PCS | Mod: CPTII,S$GLB,, | Performed by: FAMILY MEDICINE

## 2023-07-26 PROCEDURE — 3044F PR MOST RECENT HEMOGLOBIN A1C LEVEL <7.0%: ICD-10-PCS | Mod: CPTII,S$GLB,, | Performed by: FAMILY MEDICINE

## 2023-07-26 PROCEDURE — 3075F PR MOST RECENT SYSTOLIC BLOOD PRESS GE 130-139MM HG: ICD-10-PCS | Mod: CPTII,S$GLB,, | Performed by: FAMILY MEDICINE

## 2023-07-26 PROCEDURE — 20550 NJX 1 TENDON SHEATH/LIGAMENT: CPT | Mod: LT,S$GLB,, | Performed by: ORTHOPAEDIC SURGERY

## 2023-07-26 PROCEDURE — 3044F HG A1C LEVEL LT 7.0%: CPT | Mod: CPTII,S$GLB,, | Performed by: ORTHOPAEDIC SURGERY

## 2023-07-26 PROCEDURE — 3008F BODY MASS INDEX DOCD: CPT | Mod: CPTII,S$GLB,, | Performed by: FAMILY MEDICINE

## 2023-07-26 PROCEDURE — 4010F PR ACE/ARB THEARPY RXD/TAKEN: ICD-10-PCS | Mod: CPTII,S$GLB,, | Performed by: FAMILY MEDICINE

## 2023-07-26 PROCEDURE — 1159F MED LIST DOCD IN RCRD: CPT | Mod: CPTII,S$GLB,, | Performed by: ORTHOPAEDIC SURGERY

## 2023-07-26 PROCEDURE — 99999 PR PBB SHADOW E&M-EST. PATIENT-LVL III: CPT | Mod: PBBFAC,,, | Performed by: FAMILY MEDICINE

## 2023-07-26 PROCEDURE — 99999 PR PBB SHADOW E&M-EST. PATIENT-LVL III: ICD-10-PCS | Mod: PBBFAC,,, | Performed by: FAMILY MEDICINE

## 2023-07-26 PROCEDURE — 4010F ACE/ARB THERAPY RXD/TAKEN: CPT | Mod: CPTII,S$GLB,, | Performed by: ORTHOPAEDIC SURGERY

## 2023-07-26 PROCEDURE — 3288F FALL RISK ASSESSMENT DOCD: CPT | Mod: CPTII,S$GLB,, | Performed by: ORTHOPAEDIC SURGERY

## 2023-07-26 PROCEDURE — 99999 PR PBB SHADOW E&M-EST. PATIENT-LVL III: ICD-10-PCS | Mod: PBBFAC,,, | Performed by: ORTHOPAEDIC SURGERY

## 2023-07-26 PROCEDURE — 3075F SYST BP GE 130 - 139MM HG: CPT | Mod: CPTII,S$GLB,, | Performed by: FAMILY MEDICINE

## 2023-07-26 PROCEDURE — 3044F PR MOST RECENT HEMOGLOBIN A1C LEVEL <7.0%: ICD-10-PCS | Mod: CPTII,S$GLB,, | Performed by: ORTHOPAEDIC SURGERY

## 2023-07-26 PROCEDURE — 1101F PR PT FALLS ASSESS DOC 0-1 FALLS W/OUT INJ PAST YR: ICD-10-PCS | Mod: CPTII,S$GLB,, | Performed by: FAMILY MEDICINE

## 2023-07-26 PROCEDURE — 3288F PR FALLS RISK ASSESSMENT DOCUMENTED: ICD-10-PCS | Mod: CPTII,S$GLB,, | Performed by: ORTHOPAEDIC SURGERY

## 2023-07-26 PROCEDURE — 3078F PR MOST RECENT DIASTOLIC BLOOD PRESSURE < 80 MM HG: ICD-10-PCS | Mod: CPTII,S$GLB,, | Performed by: FAMILY MEDICINE

## 2023-07-26 PROCEDURE — 1101F PR PT FALLS ASSESS DOC 0-1 FALLS W/OUT INJ PAST YR: ICD-10-PCS | Mod: CPTII,S$GLB,, | Performed by: ORTHOPAEDIC SURGERY

## 2023-07-26 PROCEDURE — 80053 COMPREHEN METABOLIC PANEL: CPT | Performed by: FAMILY MEDICINE

## 2023-07-26 PROCEDURE — 99214 OFFICE O/P EST MOD 30 MIN: CPT | Mod: S$GLB,,, | Performed by: FAMILY MEDICINE

## 2023-07-26 PROCEDURE — 20550 TENDON SHEATH: ICD-10-PCS | Mod: LT,S$GLB,, | Performed by: ORTHOPAEDIC SURGERY

## 2023-07-26 PROCEDURE — 3078F DIAST BP <80 MM HG: CPT | Mod: CPTII,S$GLB,, | Performed by: FAMILY MEDICINE

## 2023-07-26 PROCEDURE — 1126F AMNT PAIN NOTED NONE PRSNT: CPT | Mod: CPTII,S$GLB,, | Performed by: FAMILY MEDICINE

## 2023-07-26 PROCEDURE — 99213 PR OFFICE/OUTPT VISIT, EST, LEVL III, 20-29 MIN: ICD-10-PCS | Mod: 25,S$GLB,, | Performed by: ORTHOPAEDIC SURGERY

## 2023-07-26 PROCEDURE — 1160F PR REVIEW ALL MEDS BY PRESCRIBER/CLIN PHARMACIST DOCUMENTED: ICD-10-PCS | Mod: CPTII,S$GLB,, | Performed by: ORTHOPAEDIC SURGERY

## 2023-07-26 PROCEDURE — 1125F AMNT PAIN NOTED PAIN PRSNT: CPT | Mod: CPTII,S$GLB,, | Performed by: ORTHOPAEDIC SURGERY

## 2023-07-26 PROCEDURE — 1101F PT FALLS ASSESS-DOCD LE1/YR: CPT | Mod: CPTII,S$GLB,, | Performed by: FAMILY MEDICINE

## 2023-07-26 PROCEDURE — 3008F PR BODY MASS INDEX (BMI) DOCUMENTED: ICD-10-PCS | Mod: CPTII,S$GLB,, | Performed by: ORTHOPAEDIC SURGERY

## 2023-07-26 PROCEDURE — 4010F ACE/ARB THERAPY RXD/TAKEN: CPT | Mod: CPTII,S$GLB,, | Performed by: FAMILY MEDICINE

## 2023-07-26 PROCEDURE — 3288F PR FALLS RISK ASSESSMENT DOCUMENTED: ICD-10-PCS | Mod: CPTII,S$GLB,, | Performed by: FAMILY MEDICINE

## 2023-07-26 PROCEDURE — 1160F RVW MEDS BY RX/DR IN RCRD: CPT | Mod: CPTII,S$GLB,, | Performed by: ORTHOPAEDIC SURGERY

## 2023-07-26 RX ORDER — ESZOPICLONE 3 MG/1
3 TABLET, FILM COATED ORAL NIGHTLY
Qty: 30 TABLET | Refills: 2 | Status: SHIPPED | OUTPATIENT
Start: 2023-07-26 | End: 2023-10-23 | Stop reason: SDUPTHER

## 2023-07-26 RX ORDER — AMITRIPTYLINE HYDROCHLORIDE 150 MG/1
150 TABLET ORAL NIGHTLY
Qty: 90 TABLET | Refills: 1 | Status: SHIPPED | OUTPATIENT
Start: 2023-07-26 | End: 2024-01-17 | Stop reason: SDUPTHER

## 2023-07-26 RX ORDER — TRIAMCINOLONE ACETONIDE 40 MG/ML
40 INJECTION, SUSPENSION INTRA-ARTICULAR; INTRAMUSCULAR
Status: DISCONTINUED | OUTPATIENT
Start: 2023-07-26 | End: 2023-07-26 | Stop reason: HOSPADM

## 2023-07-26 RX ADMIN — TRIAMCINOLONE ACETONIDE 40 MG: 40 INJECTION, SUSPENSION INTRA-ARTICULAR; INTRAMUSCULAR at 01:07

## 2023-07-26 NOTE — PROGRESS NOTES
Subjective:     Patient ID: Cassandra Campos is a 73 y.o. female.    Chief Complaint: Pain of the Left Hand      HPI:  The patient is a 73-year-old female with a left thumb index long and ring trigger finger who request injection.  She had her right ring and long finger trigger fingers injected 2023 with good results and no recurrence yet    Past Medical History:   Diagnosis Date    Arthritis     hands    Bilateral bunions     Borderline glaucoma     De Quervain's disease (radial styloid tenosynovitis)     Gastritis     upper GI 2017    Hydradenitis     Hyperlipidemia     Hypertension     Insomnia     Migraines 2000    Nasal septum perforation     Obesity     Pneumonia     Restrictive airway disease     Sleep apnea     SVT (supraventricular tachycardia) 2013    Trigger finger     Type 2 diabetes mellitus      am 08/10/2021     Past Surgical History:   Procedure Laterality Date    AXILLARY HIDRADENITIS EXCISION Bilateral     BONE EXOSTOSIS EXCISION Right 2018    Procedure: EXCISION, EXOSTOSIS;  Surgeon: Jayro Pedraza Sr., MD;  Location: [facility];  Service: Orthopedics;  Laterality: Right;    BREAST BIOPSY Bilateral     both benign    BREAST SURGERY  1998    CARPAL TUNNEL RELEASE      bilateral    CATARACT EXTRACTION Bilateral     OU     SECTION  1979    CHOLECYSTECTOMY  2014    COLONOSCOPY N/A 10/02/2020    Procedure: COLONOSCOPY;  Surgeon: Tushar Edwards MD;  Location: [facility];  Service: Endoscopy;  Laterality: N/A;    COLONOSCOPY W/ POLYPECTOMY  10/02/2020    Polyps x3, repeat 5 years; Tushar Edwards MD     CYST REMOVAL  2015    sebaceous cyst removed from face    DE QUERVAIN'S RELEASE Left 2020    Procedure: RELEASE, HAND, FOR DEQUERVAIN'S TENOSYNOVITIS;  Surgeon: Jaime Oswald MD;  Location: [facility] OR;  Service: Orthopedics;  Laterality: Left;    DE QUERVAIN'S RELEASE Right 2020    Procedure: RELEASE, HAND, FOR DEQUERVAIN'S TENOSYNOVITIS;   Surgeon: Jaime Oswald MD;  Location: [facility] OR;  Service: Orthopedics;  Laterality: Right;    EYE SURGERY      gastric sleeve  2017    Dr. Watson    KNEE SURGERY Right     OLECRANON BURSECTOMY Right 2018    Procedure: BURSECTOMY, OLECRANON;  Surgeon: Jayro Pedraza Sr., MD;  Location: [facility];  Service: Orthopedics;  Laterality: Right;    SURGICAL REMOVAL OF BUNION WITH OSTEOTOMY OF METATARSAL BONE Left 05/10/2019    Procedure: BUNIONECTOMY, WITH METATARSAL OSTEOTOMY;  Surgeon: Srinivasan Villanueva DPM;  Location: [facility];  Service: Podiatry;  Laterality: Left;    SURGICAL REMOVAL OF BUNION WITH OSTEOTOMY OF METATARSAL BONE Right 2019    Procedure: BUNIONECTOMY, WITH METATARSAL OSTEOTOMY;  Surgeon: Srinivasan Villanueva DPM;  Location: [facility];  Service: Podiatry;  Laterality: Right;    TONSILLECTOMY, ADENOIDECTOMY  1980s    TRIGGER FINGER RELEASE Right 2015    Dr. Pedraza     Family History   Problem Relation Age of Onset    Prostate cancer Brother     Diabetes Maternal Aunt     Diabetes Cousin     Hypertension Maternal Grandmother      Social History     Socioeconomic History    Marital status:     Number of children: 1   Occupational History    Occupation:  aid   Tobacco Use    Smoking status: Former     Packs/day: 0.50     Years: 30.00     Pack years: 15.00     Types: Cigarettes     Start date: 1970     Quit date: 2012     Years since quittin.5    Smokeless tobacco: Never   Substance and Sexual Activity    Alcohol use: Yes     Alcohol/week: 1.0 standard drink     Types: 1 Glasses of wine per week     Comment: Glass red wine once a every 2 weeks    Drug use: Never    Sexual activity: Not Currently     Partners: Male     Birth control/protection: Abstinence, None   Social History Narrative    Single, part-time teacher. Masters degree biology.      Social Determinants of Health     Financial Resource Strain: Low Risk     Difficulty of Paying Living Expenses:  Not hard at all   Food Insecurity: Food Insecurity Present    Worried About Running Out of Food in the Last Year: Sometimes true    Ran Out of Food in the Last Year: Sometimes true   Transportation Needs: No Transportation Needs    Lack of Transportation (Medical): No    Lack of Transportation (Non-Medical): No   Physical Activity: Insufficiently Active    Days of Exercise per Week: 3 days    Minutes of Exercise per Session: 30 min   Stress: Stress Concern Present    Feeling of Stress : To some extent   Social Connections: Unknown    Frequency of Communication with Friends and Family: Twice a week    Frequency of Social Gatherings with Friends and Family: Three times a week    Active Member of Clubs or Organizations: Yes    Attends Club or Organization Meetings: 1 to 4 times per year    Marital Status:    Housing Stability: Low Risk     Unable to Pay for Housing in the Last Year: No    Number of Places Lived in the Last Year: 1    Unstable Housing in the Last Year: No     Medication List with Changes/Refills   Current Medications    ACCU-CHEK GUIDE TEST STRIPS STRP    USE TO TEST TWICE A DAY    ALBUTEROL (PROVENTIL/VENTOLIN HFA) 90 MCG/ACTUATION INHALER    INHALE 2 PUFFS INTO THE LUNGS EVERY 4 HOURS AS NEEDED FOR WHEEZING OR SHORTNESS OF BREATH.    BLOOD GLUCOSE CONTROL, HIGH (TRUE METRIX LEVEL 3) SOLN    1 each by Other route once daily.    BLOOD-GLUCOSE METER MISC    1 each by Misc.(Non-Drug; Combo Route) route 2 (two) times a day.    BLOOD-GLUCOSE SENSOR (DEXCOM G7 SENSOR) LUISA    1 Device by Misc.(Non-Drug; Combo Route) route continuous.    CHOLECALCIFEROL, VITAMIN D3, (VITAMIN D3) 25 MCG (1,000 UNIT) CAPSULE    Take 1,000 Units by mouth once daily.    DARIDOREXANT (QUVIVIQ) 25 MG TAB    Take 1 each by mouth every evening.    LANCING DEVICE WITH LANCETS (ACCU-CHEK SOFT DEV LANCETS) KIT    1 each by Misc.(Non-Drug; Combo Route) route 2 (two) times a day.    LORAZEPAM (ATIVAN) 1 MG TABLET    TAKE 1 TABLET  BY MOUTH TWICE A DAY    LOSARTAN (COZAAR) 25 MG TABLET    TAKE 1 TABLET BY MOUTH ONCE  DAILY    METOPROLOL SUCCINATE (TOPROL-XL) 50 MG 24 HR TABLET    Take 1 tablet (50 mg total) by mouth once daily.    OMEPRAZOLE (PRILOSEC) 20 MG CAPSULE    Take 1 capsule (20 mg total) by mouth once daily.    SEMAGLUTIDE (OZEMPIC) 2 MG/DOSE (8 MG/3 ML) PNIJ    Inject 2 mg into the skin every 7 days.    TRUEPLUS LANCETS 33 GAUGE MISC    TEST BLOOD SUGAR ONE TIME DAILY   Discontinued Medications    LANCETS MISC    USE TO TEST BLOOD SUGAR ONCE DAILY     Review of patient's allergies indicates:   Allergen Reactions    Statins-hmg-coa reductase inhibitors Other (See Comments)     Elevated liver functions     Review of Systems   Constitutional: Negative for malaise/fatigue.   HENT:  Negative for hearing loss.    Eyes:  Negative for double vision and visual disturbance.   Cardiovascular:  Negative for chest pain.   Respiratory:  Positive for wheezing. Negative for shortness of breath.    Endocrine: Negative for cold intolerance.   Hematologic/Lymphatic: Does not bruise/bleed easily.   Skin:  Negative for poor wound healing and suspicious lesions.   Musculoskeletal:  Positive for arthritis, joint pain and joint swelling. Negative for gout.   Gastrointestinal:  Positive for heartburn. Negative for nausea and vomiting.   Genitourinary:  Negative for dysuria.   Neurological:  Negative for numbness, paresthesias and sensory change.   Psychiatric/Behavioral:  Positive for altered mental status and substance abuse. Negative for depression and memory loss. The patient has insomnia and is nervous/anxious.    Allergic/Immunologic: Negative for persistent infections.     Objective:   Body mass index is 34.48 kg/m².  There were no vitals filed for this visit.             General    Constitutional: She is oriented to person, place, and time. She appears well-developed and well-nourished. No distress.   HENT:   Head: Normocephalic.   Eyes: EOM are  normal.   Pulmonary/Chest: Effort normal.   Neurological: She is oriented to person, place, and time.   Psychiatric: She has a normal mood and affect.         Left Hand/Wrist Exam     Inspection   Scars: Wrist - absent Hand -  absent  Effusion: Wrist - absent Hand -  absent    Pain   Hand - The patient exhibits pain of the thumb MCP, ring MCP, middle MCP and index MCP.    Other     Sensory Exam  Median Distribution: normal  Ulnar Distribution: normal  Radial Distribution: normal    Comments:  The patient has active triggering left thumb index long and ring fingers with palpable nodules that move with tendon excursion.          Vascular Exam       Capillary Refill  Left Hand: normal capillary refill        Relevant imaging results reviewed and interpreted by me, discussed with the patient and / or family today radiographs were not obtained today  Assessment:     Encounter Diagnosis   Name Primary?    Trigger finger, acquired Yes    Left thumb index long and ring fingers    Plan:     The patient is left thumb index long and ring fingers were injected each with 0.5 cc of Kenalog and 0.5 cc of 2% plain lidocaine under sterile technique.  She will wait at least 3 months between injections.                Disclaimer: This note was prepared using a voice recognition system and is likely to have sound alike errors within the text.

## 2023-07-26 NOTE — PROCEDURES
Tendon Sheath    Date/Time: 7/26/2023 1:15 PM  Performed by: Jaime Oswald MD  Authorized by: Jaime Oswald MD     Consent Done?:  Yes (Verbal)  Indications:  Pain  Timeout: prior to procedure the correct patient, procedure, and site was verified    Prep: patient was prepped and draped in usual sterile fashion      Local anesthesia used?: Yes    Local anesthetic:  Lidocaine 2% without epinephrine  Anesthetic total (ml):  0.5    Location:  Ring finger  Site:  L ring flexor tendon sheath  Ultrasonic guidance for needle placement?: No    Needle size:  25 G  Approach:  Volar  Medications:  40 mg triamcinolone acetonide 40 mg/mL

## 2023-07-26 NOTE — PROGRESS NOTES
Subjective:       Patient ID: Cassandra Campos is a 73 y.o. female.    Chief Complaint: Follow-up    Follow-up diabetes BMI insomnia hypertension.  She could not afford new sleep medication.  She wants to resume Lunesta 3 mg at bedtime which helped.  She also needs refill on amitriptyline 150 mg.  She denies chest pain palpitations shortness breath.  She denies polyuria polydipsia hypoglycemic symptoms.  She lost additional 3 lb of weight since last visit.  She currently is not using Ozempic because is on back order.    Follow-up  Pertinent negatives include no abdominal pain, chest pain, chills, coughing, fever or weakness.   Review of Systems   Constitutional:  Negative for chills and fever.   Respiratory:  Negative for cough, chest tightness, shortness of breath and wheezing.    Cardiovascular:  Negative for chest pain, palpitations and leg swelling.   Gastrointestinal:  Negative for abdominal distention and abdominal pain.   Endocrine: Negative for polydipsia and polyuria.   Neurological:  Negative for dizziness, weakness and light-headedness.   Psychiatric/Behavioral:  Positive for sleep disturbance.      Objective:      Physical Exam  Constitutional:       General: She is not in acute distress.     Appearance: She is not ill-appearing or diaphoretic.   Cardiovascular:      Rate and Rhythm: Normal rate and regular rhythm.      Heart sounds: No murmur heard.    No gallop.   Pulmonary:      Effort: Pulmonary effort is normal. No respiratory distress.      Breath sounds: No wheezing, rhonchi or rales.   Abdominal:      General: There is no distension.   Musculoskeletal:      Right foot: No deformity.      Left foot: No deformity (good capillary filling).   Feet:      Right foot:      Protective Sensation: 10 sites tested.  10 sites sensed.      Skin integrity: Skin integrity normal.      Toenail Condition: Right toenails are normal.      Left foot:      Protective Sensation: 10 sites tested.  10 sites sensed.       Skin integrity: Skin integrity normal.      Toenail Condition: Left toenails are normal.   Lymphadenopathy:      Cervical: No cervical adenopathy.   Skin:     General: Skin is warm and dry.      Coloration: Skin is not pale.      Findings: No erythema.   Neurological:      Mental Status: She is alert.   Psychiatric:         Mood and Affect: Mood normal.         Behavior: Behavior normal.       Lab Visit on 04/11/2023   Component Date Value Ref Range Status    Hemoglobin A1C 04/11/2023 5.6  4.0 - 5.6 % Final    Estimated Avg Glucose 04/11/2023 114  68 - 131 mg/dL Final     Assessment:       1. Controlled type 2 diabetes mellitus without complication, without long-term current use of insulin        Plan:   Refill lunesta refill amitriptyline.  CMP microalbumin creatinine ratio was ordered.  Continue diet.  Follow-up in 3 months.      Controlled type 2 diabetes mellitus without complication, without long-term current use of insulin  -     Comprehensive Metabolic Panel; Future; Expected date: 07/26/2023  -     Microalbumin/Creatinine Ratio, Urine; Future; Expected date: 07/26/2023    Other orders  -     eszopiclone (LUNESTA) 3 mg Tab; Take 1 tablet (3 mg total) by mouth every evening.  Dispense: 30 tablet; Refill: 2  -     amitriptyline (ELAVIL) 150 MG Tab; Take 1 tablet (150 mg total) by mouth every evening.  Dispense: 90 tablet; Refill: 1

## 2023-07-26 NOTE — PROCEDURES
Tendon Sheath    Date/Time: 7/26/2023 1:15 PM  Performed by: Jaime Oswald MD  Authorized by: Jaime Oswald MD     Consent Done?:  Yes (Verbal)  Indications:  Pain  Timeout: prior to procedure the correct patient, procedure, and site was verified    Prep: patient was prepped and draped in usual sterile fashion      Local anesthesia used?: Yes    Local anesthetic:  Lidocaine 2% without epinephrine  Anesthetic total (ml):  0.5    Location:  Long finger  Site:  L long flexor tendon sheath  Ultrasonic guidance for needle placement?: No    Needle size:  25 G  Approach:  Volar  Medications:  40 mg triamcinolone acetonide 40 mg/mL

## 2023-07-26 NOTE — PROCEDURES
Tendon Sheath    Date/Time: 7/26/2023 1:15 PM  Performed by: Jaime Oswald MD  Authorized by: Jaime Oswald MD     Consent Done?:  Yes (Verbal)  Indications:  Pain  Timeout: prior to procedure the correct patient, procedure, and site was verified    Prep: patient was prepped and draped in usual sterile fashion      Local anesthesia used?: Yes    Local anesthetic:  Lidocaine 2% without epinephrine  Anesthetic total (ml):  0.5    Location:  Index finger  Site:  L index flexor tendon sheath  Ultrasonic guidance for needle placement?: No    Needle size:  25 G  Approach:  Volar  Medications:  40 mg triamcinolone acetonide 40 mg/mL

## 2023-07-26 NOTE — PROCEDURES
Tendon Sheath    Date/Time: 7/26/2023 1:15 PM  Performed by: Jaime Oswald MD  Authorized by: Jaime Osawld MD     Consent Done?:  Yes (Verbal)  Indications:  Pain  Timeout: prior to procedure the correct patient, procedure, and site was verified    Prep: patient was prepped and draped in usual sterile fashion      Local anesthesia used?: Yes    Local anesthetic:  Lidocaine 2% without epinephrine  Anesthetic total (ml):  0.5    Location:  Thumb  Site:  L thumb flexor tendon sheath  Ultrasonic guidance for needle placement?: No    Needle size:  25 G  Approach:  Volar  Medications:  40 mg triamcinolone acetonide 40 mg/mL

## 2023-08-26 DIAGNOSIS — G47.00 INSOMNIA, UNSPECIFIED TYPE: ICD-10-CM

## 2023-08-27 RX ORDER — SEMAGLUTIDE 2.68 MG/ML
2 INJECTION, SOLUTION SUBCUTANEOUS
Qty: 3 ML | Refills: 2 | Status: CANCELLED | OUTPATIENT
Start: 2023-08-27 | End: 2024-08-26

## 2023-08-27 NOTE — TELEPHONE ENCOUNTER
No care due was identified.  Health [facility] Embedded Care Due Messages. Reference number: [id number].   8/27/2023 11:01:30 AM CDT

## 2023-08-27 NOTE — TELEPHONE ENCOUNTER
No care due was identified.  [facility] Embedded Care Due Messages. Reference number: [id number].   8/26/2023 9:46:34 PM CDT

## 2023-08-28 RX ORDER — TRAZODONE HYDROCHLORIDE 100 MG/1
100 TABLET ORAL NIGHTLY
Qty: 90 TABLET | Refills: 1 | OUTPATIENT
Start: 2023-08-28

## 2023-08-28 RX ORDER — SEMAGLUTIDE 2.68 MG/ML
2 INJECTION, SOLUTION SUBCUTANEOUS
Qty: 3 ML | Refills: 2 | Status: SHIPPED | OUTPATIENT
Start: 2023-08-28 | End: 2023-11-21 | Stop reason: SDUPTHER

## 2023-08-28 NOTE — TELEPHONE ENCOUNTER
No care due was identified.  [facility] Embedded Care Due Messages. Reference number: [id number].   8/28/2023 9:18:17 AM CDT

## 2023-09-22 ENCOUNTER — PATIENT MESSAGE (OUTPATIENT)
Dept: INTERNAL MEDICINE | Facility: CLINIC | Age: 73
End: 2023-09-22
Payer: MEDICARE

## 2023-09-22 ENCOUNTER — TELEPHONE (OUTPATIENT)
Dept: ORTHOPEDICS | Facility: CLINIC | Age: 73
End: 2023-09-22
Payer: MEDICARE

## 2023-09-22 NOTE — TELEPHONE ENCOUNTER
Called patient in regards to appointment requests. Patient expressed pain in her shoulder going down into her arm. Appt made with Dr. Palma next Wednesday at 10:45am. Patient also requests to be seen sooner for her hands. Informed patient that she has Dr. Oswald's next available but we can get her scheduled with Falguni Lindquist PA-C, patient accepted. Advised patient she cannot get Left hand injections until 10/26 due to having to wait 3 month between shots. Informed patients I can make her appointment that exact day. Patient now scheduled with Falguni Lindquist PA-C on 10/26 at 1pm. Patient expressed frustration about appointment scheduling.

## 2023-09-23 DIAGNOSIS — M25.512 BILATERAL SHOULDER PAIN, UNSPECIFIED CHRONICITY: Primary | ICD-10-CM

## 2023-09-23 DIAGNOSIS — M25.511 BILATERAL SHOULDER PAIN, UNSPECIFIED CHRONICITY: Primary | ICD-10-CM

## 2023-09-25 ENCOUNTER — TELEPHONE (OUTPATIENT)
Dept: SPORTS MEDICINE | Facility: CLINIC | Age: 73
End: 2023-09-25
Payer: MEDICARE

## 2023-09-25 NOTE — TELEPHONE ENCOUNTER
Reached out to pt about her upcoming appointment and let her know of the X-rays that are needed before, Pt expressed understanding

## 2023-09-27 ENCOUNTER — OFFICE VISIT (OUTPATIENT)
Dept: SPORTS MEDICINE | Facility: CLINIC | Age: 73
End: 2023-09-27
Payer: MEDICARE

## 2023-09-27 ENCOUNTER — HOSPITAL ENCOUNTER (OUTPATIENT)
Dept: RADIOLOGY | Facility: HOSPITAL | Age: 73
Discharge: HOME | End: 2023-09-27
Attending: STUDENT IN AN ORGANIZED HEALTH CARE EDUCATION/TRAINING PROGRAM
Payer: MEDICARE

## 2023-09-27 VITALS — WEIGHT: 192 LBS | BODY MASS INDEX: 34.02 KG/M2 | HEIGHT: 63 IN | RESPIRATION RATE: 17 BRPM

## 2023-09-27 DIAGNOSIS — M67.919 TENDINOPATHY OF ROTATOR CUFF, UNSPECIFIED LATERALITY: Primary | ICD-10-CM

## 2023-09-27 DIAGNOSIS — M25.511 BILATERAL SHOULDER PAIN, UNSPECIFIED CHRONICITY: ICD-10-CM

## 2023-09-27 DIAGNOSIS — M75.40 SUBACROMIAL IMPINGEMENT, UNSPECIFIED LATERALITY: ICD-10-CM

## 2023-09-27 DIAGNOSIS — M25.512 BILATERAL SHOULDER PAIN, UNSPECIFIED CHRONICITY: ICD-10-CM

## 2023-09-27 PROCEDURE — 1160F PR REVIEW ALL MEDS BY PRESCRIBER/CLIN PHARMACIST DOCUMENTED: ICD-10-PCS | Mod: CPTII,S$GLB,, | Performed by: STUDENT IN AN ORGANIZED HEALTH CARE EDUCATION/TRAINING PROGRAM

## 2023-09-27 PROCEDURE — 4010F PR ACE/ARB THEARPY RXD/TAKEN: ICD-10-PCS | Mod: CPTII,S$GLB,, | Performed by: STUDENT IN AN ORGANIZED HEALTH CARE EDUCATION/TRAINING PROGRAM

## 2023-09-27 PROCEDURE — 3044F PR MOST RECENT HEMOGLOBIN A1C LEVEL <7.0%: ICD-10-PCS | Mod: CPTII,S$GLB,, | Performed by: STUDENT IN AN ORGANIZED HEALTH CARE EDUCATION/TRAINING PROGRAM

## 2023-09-27 PROCEDURE — 97110 PR THERAPEUTIC EXERCISES: ICD-10-PCS | Mod: GP,S$GLB,, | Performed by: STUDENT IN AN ORGANIZED HEALTH CARE EDUCATION/TRAINING PROGRAM

## 2023-09-27 PROCEDURE — 1125F AMNT PAIN NOTED PAIN PRSNT: CPT | Mod: CPTII,S$GLB,, | Performed by: STUDENT IN AN ORGANIZED HEALTH CARE EDUCATION/TRAINING PROGRAM

## 2023-09-27 PROCEDURE — 1160F RVW MEDS BY RX/DR IN RCRD: CPT | Mod: CPTII,S$GLB,, | Performed by: STUDENT IN AN ORGANIZED HEALTH CARE EDUCATION/TRAINING PROGRAM

## 2023-09-27 PROCEDURE — 1101F PR PT FALLS ASSESS DOC 0-1 FALLS W/OUT INJ PAST YR: ICD-10-PCS | Mod: CPTII,S$GLB,, | Performed by: STUDENT IN AN ORGANIZED HEALTH CARE EDUCATION/TRAINING PROGRAM

## 2023-09-27 PROCEDURE — 97110 THERAPEUTIC EXERCISES: CPT | Mod: GP,S$GLB,, | Performed by: STUDENT IN AN ORGANIZED HEALTH CARE EDUCATION/TRAINING PROGRAM

## 2023-09-27 PROCEDURE — 3288F FALL RISK ASSESSMENT DOCD: CPT | Mod: CPTII,S$GLB,, | Performed by: STUDENT IN AN ORGANIZED HEALTH CARE EDUCATION/TRAINING PROGRAM

## 2023-09-27 PROCEDURE — 73030 XR SHOULDER COMPLETE 2 OR MORE VIEWS BILATERAL: ICD-10-PCS | Mod: 26,50,, | Performed by: RADIOLOGY

## 2023-09-27 PROCEDURE — 1125F PR PAIN SEVERITY QUANTIFIED, PAIN PRESENT: ICD-10-PCS | Mod: CPTII,S$GLB,, | Performed by: STUDENT IN AN ORGANIZED HEALTH CARE EDUCATION/TRAINING PROGRAM

## 2023-09-27 PROCEDURE — 99999 PR PBB SHADOW E&M-EST. PATIENT-LVL IV: CPT | Mod: PBBFAC,,, | Performed by: STUDENT IN AN ORGANIZED HEALTH CARE EDUCATION/TRAINING PROGRAM

## 2023-09-27 PROCEDURE — 3008F BODY MASS INDEX DOCD: CPT | Mod: CPTII,S$GLB,, | Performed by: STUDENT IN AN ORGANIZED HEALTH CARE EDUCATION/TRAINING PROGRAM

## 2023-09-27 PROCEDURE — 3044F HG A1C LEVEL LT 7.0%: CPT | Mod: CPTII,S$GLB,, | Performed by: STUDENT IN AN ORGANIZED HEALTH CARE EDUCATION/TRAINING PROGRAM

## 2023-09-27 PROCEDURE — 20610 DRAIN/INJ JOINT/BURSA W/O US: CPT | Mod: 50,S$GLB,, | Performed by: STUDENT IN AN ORGANIZED HEALTH CARE EDUCATION/TRAINING PROGRAM

## 2023-09-27 PROCEDURE — 1159F MED LIST DOCD IN RCRD: CPT | Mod: CPTII,S$GLB,, | Performed by: STUDENT IN AN ORGANIZED HEALTH CARE EDUCATION/TRAINING PROGRAM

## 2023-09-27 PROCEDURE — 99213 OFFICE O/P EST LOW 20 MIN: CPT | Mod: 25,S$GLB,, | Performed by: STUDENT IN AN ORGANIZED HEALTH CARE EDUCATION/TRAINING PROGRAM

## 2023-09-27 PROCEDURE — 3066F NEPHROPATHY DOC TX: CPT | Mod: CPTII,S$GLB,, | Performed by: STUDENT IN AN ORGANIZED HEALTH CARE EDUCATION/TRAINING PROGRAM

## 2023-09-27 PROCEDURE — 73030 X-RAY EXAM OF SHOULDER: CPT | Mod: 26,50,, | Performed by: RADIOLOGY

## 2023-09-27 PROCEDURE — 1159F PR MEDICATION LIST DOCUMENTED IN MEDICAL RECORD: ICD-10-PCS | Mod: CPTII,S$GLB,, | Performed by: STUDENT IN AN ORGANIZED HEALTH CARE EDUCATION/TRAINING PROGRAM

## 2023-09-27 PROCEDURE — 4010F ACE/ARB THERAPY RXD/TAKEN: CPT | Mod: CPTII,S$GLB,, | Performed by: STUDENT IN AN ORGANIZED HEALTH CARE EDUCATION/TRAINING PROGRAM

## 2023-09-27 PROCEDURE — 1101F PT FALLS ASSESS-DOCD LE1/YR: CPT | Mod: CPTII,S$GLB,, | Performed by: STUDENT IN AN ORGANIZED HEALTH CARE EDUCATION/TRAINING PROGRAM

## 2023-09-27 PROCEDURE — 73030 X-RAY EXAM OF SHOULDER: CPT | Mod: TC,50

## 2023-09-27 PROCEDURE — 99999 PR PBB SHADOW E&M-EST. PATIENT-LVL IV: ICD-10-PCS | Mod: PBBFAC,,, | Performed by: STUDENT IN AN ORGANIZED HEALTH CARE EDUCATION/TRAINING PROGRAM

## 2023-09-27 PROCEDURE — 3288F PR FALLS RISK ASSESSMENT DOCUMENTED: ICD-10-PCS | Mod: CPTII,S$GLB,, | Performed by: STUDENT IN AN ORGANIZED HEALTH CARE EDUCATION/TRAINING PROGRAM

## 2023-09-27 PROCEDURE — 3008F PR BODY MASS INDEX (BMI) DOCUMENTED: ICD-10-PCS | Mod: CPTII,S$GLB,, | Performed by: STUDENT IN AN ORGANIZED HEALTH CARE EDUCATION/TRAINING PROGRAM

## 2023-09-27 PROCEDURE — 3061F NEG MICROALBUMINURIA REV: CPT | Mod: CPTII,S$GLB,, | Performed by: STUDENT IN AN ORGANIZED HEALTH CARE EDUCATION/TRAINING PROGRAM

## 2023-09-27 PROCEDURE — 99213 PR OFFICE/OUTPT VISIT, EST, LEVL III, 20-29 MIN: ICD-10-PCS | Mod: 25,S$GLB,, | Performed by: STUDENT IN AN ORGANIZED HEALTH CARE EDUCATION/TRAINING PROGRAM

## 2023-09-27 PROCEDURE — 3061F PR NEG MICROALBUMINURIA RESULT DOCUMENTED/REVIEW: ICD-10-PCS | Mod: CPTII,S$GLB,, | Performed by: STUDENT IN AN ORGANIZED HEALTH CARE EDUCATION/TRAINING PROGRAM

## 2023-09-27 PROCEDURE — 3066F PR DOCUMENTATION OF TREATMENT FOR NEPHROPATHY: ICD-10-PCS | Mod: CPTII,S$GLB,, | Performed by: STUDENT IN AN ORGANIZED HEALTH CARE EDUCATION/TRAINING PROGRAM

## 2023-09-27 PROCEDURE — 20610 LARGE JOINT ASPIRATION/INJECTION: BILATERAL SUBACROMIAL BURSA: ICD-10-PCS | Mod: 50,S$GLB,, | Performed by: STUDENT IN AN ORGANIZED HEALTH CARE EDUCATION/TRAINING PROGRAM

## 2023-09-27 RX ORDER — LORAZEPAM 1 MG/1
TABLET ORAL
Qty: 60 TABLET | Refills: 2 | Status: SHIPPED | OUTPATIENT
Start: 2023-09-27 | End: 2023-12-19 | Stop reason: SDUPTHER

## 2023-09-27 RX ADMIN — TRIAMCINOLONE ACETONIDE 40 MG: 40 INJECTION, SUSPENSION INTRA-ARTICULAR; INTRAMUSCULAR at 10:09

## 2023-09-27 NOTE — PROGRESS NOTES
Orthopaedics Sports Medicine     Shoulder Initial Visit         2023    Referring MD: Self, Aaareferral    Chief Complaint   Patient presents with    Right Shoulder - Pain    Left Shoulder - Pain         History of Present Illness:   Cassandra Campos is a 73 y.o. right-hand dominant female who presents with left shoulder pain and dysfunction. Endorses a hx of a fall in the last year but cannot recall a specific date.     Onset of the symptoms was 3-4 weeks     Inciting event:Fall     Current symptoms include pain and lack of range of potion     Pain is aggravated by overhead movement, reaching movements       Evaluation to date: X-Ray, ***     Treatment to date: Rest, activity modification, Muscle relaxer, ibuprofen, heat and ice    Past Medical History:   Past Medical History:   Diagnosis Date    Arthritis     hands    Bilateral bunions     Borderline glaucoma     De Quervain's disease (radial styloid tenosynovitis)     Gastritis     upper GI 2017    Hydradenitis     Hyperlipidemia     Hypertension     Insomnia     Migraines 2000    Nasal septum perforation     Obesity     Pneumonia     Restrictive airway disease     Sleep apnea     SVT (supraventricular tachycardia) 2013    Trigger finger     Type 2 diabetes mellitus      am 08/10/2021       Past Surgical History:   Past Surgical History:   Procedure Laterality Date    AXILLARY HIDRADENITIS EXCISION Bilateral     BONE EXOSTOSIS EXCISION Right 2018    Procedure: EXCISION, EXOSTOSIS;  Surgeon: Jayro Pedraza Sr., MD;  Location: [facility] OR;  Service: Orthopedics;  Laterality: Right;    BREAST BIOPSY Bilateral     both benign    BREAST SURGERY  1998    CARPAL TUNNEL RELEASE      bilateral    CATARACT EXTRACTION Bilateral     OU     SECTION  1979    CHOLECYSTECTOMY  2014    COLONOSCOPY N/A 10/02/2020    Procedure: COLONOSCOPY;  Surgeon: Tushar Edwards MD;  Location: [facility] ENDO;  Service: Endoscopy;  Laterality: N/A;     COLONOSCOPY W/ POLYPECTOMY  10/02/2020    Polyps x3, repeat 5 years; Tushar Edwards MD     CYST REMOVAL  07/2015    sebaceous cyst removed from face    DE QUERVAIN'S RELEASE Left 01/16/2020    Procedure: RELEASE, HAND, FOR DEQUERVAIN'S TENOSYNOVITIS;  Surgeon: Jaime Oswald MD;  Location: [facility];  Service: Orthopedics;  Laterality: Left;    DE QUERVAIN'S RELEASE Right 11/20/2020    Procedure: RELEASE, HAND, FOR DEQUERVAIN'S TENOSYNOVITIS;  Surgeon: Jaime Oswald MD;  Location: [facility];  Service: Orthopedics;  Laterality: Right;    EYE SURGERY      gastric sleeve  02/13/2017    Dr. Watson    KNEE SURGERY Right     OLECRANON BURSECTOMY Right 07/25/2018    Procedure: BURSECTOMY, OLECRANON;  Surgeon: Jayro Pedraza Sr., MD;  Location: [facility];  Service: Orthopedics;  Laterality: Right;    SURGICAL REMOVAL OF BUNION WITH OSTEOTOMY OF METATARSAL BONE Left 05/10/2019    Procedure: BUNIONECTOMY, WITH METATARSAL OSTEOTOMY;  Surgeon: Srinivasan Villanueva DPM;  Location: [facility];  Service: Podiatry;  Laterality: Left;    SURGICAL REMOVAL OF BUNION WITH OSTEOTOMY OF METATARSAL BONE Right 06/28/2019    Procedure: BUNIONECTOMY, WITH METATARSAL OSTEOTOMY;  Surgeon: Srinivasan Villanueva DPM;  Location: [facility];  Service: Podiatry;  Laterality: Right;    TONSILLECTOMY, ADENOIDECTOMY  1980s    TRIGGER FINGER RELEASE Right 04/01/2015    Dr. Pedraza       Medications:  Patient's Medications   New Prescriptions    No medications on file   Previous Medications    ACCU-CHEK GUIDE TEST STRIPS STRP    USE TO TEST TWICE A DAY    ALBUTEROL (PROVENTIL/VENTOLIN HFA) 90 MCG/ACTUATION INHALER    INHALE 2 PUFFS INTO THE LUNGS EVERY 4 HOURS AS NEEDED FOR WHEEZING OR SHORTNESS OF BREATH.    AMITRIPTYLINE (ELAVIL) 150 MG TAB    Take 1 tablet (150 mg total) by mouth every evening.    BLOOD GLUCOSE CONTROL, HIGH (TRUE METRIX LEVEL 3) SOLN    1 each by Other route once daily.    BLOOD-GLUCOSE METER MISC    1 each by Misc.(Non-Drug; Combo  Route) route 2 (two) times a day.    BLOOD-GLUCOSE SENSOR (DEXCOM G7 SENSOR) LUISA    1 Device by Misc.(Non-Drug; Combo Route) route continuous.    CHOLECALCIFEROL, VITAMIN D3, (VITAMIN D3) 25 MCG (1,000 UNIT) CAPSULE    Take 1,000 Units by mouth once daily.    ESZOPICLONE (LUNESTA) 3 MG TAB    Take 1 tablet (3 mg total) by mouth every evening.    LANCING DEVICE WITH LANCETS (ACCU-CHEK SOFT DEV LANCETS) KIT    1 each by Misc.(Non-Drug; Combo Route) route 2 (two) times a day.    LORAZEPAM (ATIVAN) 1 MG TABLET    TAKE 1 TABLET BY MOUTH TWICE A DAY    LOSARTAN (COZAAR) 25 MG TABLET    TAKE 1 TABLET BY MOUTH ONCE  DAILY    METOPROLOL SUCCINATE (TOPROL-XL) 50 MG 24 HR TABLET    Take 1 tablet (50 mg total) by mouth once daily.    OMEPRAZOLE (PRILOSEC) 20 MG CAPSULE    Take 1 capsule (20 mg total) by mouth once daily.    SEMAGLUTIDE (OZEMPIC) 2 MG/DOSE (8 MG/3 ML) PNIJ    Inject 2 mg into the skin every 7 days.    TRUEPLUS LANCETS 33 GAUGE MISC    TEST BLOOD SUGAR ONE TIME DAILY   Modified Medications    No medications on file   Discontinued Medications    No medications on file       Allergies:   Review of patient's allergies indicates:   Allergen Reactions    Statins-hmg-coa reductase inhibitors Other (See Comments)     Elevated liver functions       Social History:   Home town: ***  Occupation: ***  Alcohol use: She reports current alcohol use of about 1.0 standard drink of alcohol per week.  Tobacco use: She reports that she quit smoking about 11 years ago. Her smoking use included cigarettes. She started smoking about 53 years ago. She has a 21.0 pack-year smoking history. She has never used smokeless tobacco.    Review of systems:  History of recent illness, fevers, shakes, or chills: no***  History of cardiac problems or chest pain: no***  History of pulmonary problems or asthma: no***  History of diabetes: no***  History of prior dvt or clotting problems: no***  History of sleep apnea: no***      Physical  "Examination:  Estimated body mass index is 34.01 kg/m² as calculated from the following:    Height as of this encounter: 5' 3" (1.6 m).    Weight as of this encounter: 87.1 kg (192 lb).    General  Healthy appearing female in no acute distress  Alert and oriented, normal mood, appropriate affect    Shoulder Examination:  Patient is alert and oriented, no distress. Skin is intact. Neuro is normal with no focal motor or sensory findings.    Cervical exam is unremarkable. Intact cervical ROM. Negative Spurling's test    Physical Exam:  RIGHT    LEFT    Scap Dyskinesis/Winging (-)    (-)    Tenderness:          Greater Tuberosity             (-)    (-)  Bicipital Groove  (-)    (-)  AC joint   (-)    (-)  Other:     ROM:  Forward Elevation 180***    100***  Abduction  120***    100***  ER (at side)  80***    30***  IR   T8***    T8***    Strength:   Supraspinatus  5/5    5/5  Infraspinatus  5/5    5/5  Subscap / IR  5/5    5/5     Special Tests:   Apprehension:   (-)    (-)   [place] Relocation:  (-)    (-)   Jerk / Posterior Load:  (-)    (-)   Neer:    (-)    +   Whalen:   (-)    (+   SS Stress:   (-)    +   Bear Hug:   (-)    (-)   [place]'s:   (-)    +   Resisted Thrower's:   (-)    +   Cross Arm Abduction:  (-)    (-)   Speeds test       +    Neurovascular examination  - Motor grossly intact bilaterally to shoulder abduction, elbow flexion and extension, wrist flexion and extension, and intrinsic hand musculature  - Sensation intact to light touch bilaterally in axillary, median, radial, and ulnar distributions  - Symmetrical radial pulses      Imaging:  XR Results:  Results for orders placed during the hospital encounter of 09/27/23    X-Ray Shoulder 2 or more views Bilat    Narrative  EXAM: XR SHOULDER COMPLETE 2 OR MORE VIEWS BILATERAL    CLINICAL HISTORY: [Pain]    FINDINGS: 8 views bilateral shoulders.  Mild AC joint degenerative changes.  No evidence of acute fracture or dislocation.  Normal mineralization.  " Soft tissues are unremarkable.    Impression  No acute findings.    Finalized on: 9/27/2023 10:07 AM By:  Sergei Mcclure MD  RG# [id number]      2023-09-27 10:09:43.166    BRANNMARIE      MRI Results:  No results found for this or any previous visit.      CT Results:  No results found for this or any previous visit.      Physician Read: I agree with the above impression.***      Impression:  73 y.o. female with rotator cuff tendinopathy      Plan:  Discussed diagnosis and treatment options with patient today.   I discussed performing a steroid injection and going to physical therapy. Patient does not want to go to physical therapy but will do a home exercise program instead.   I recommend proceeding with intra-articular corticosteroid injection into the bilateral subacromial space. The patient is in agreement with this plan.   Procedure performed today and patient tolerated the procedure well with no immediate complications.   I also explained if the HEP and injections don't provide any relief then we can consider moving forward with an MRI to look for further pathology  At least 15 minutes were spent developing, teaching, and performing a home exercise program.  A written summary was provided and all questions were answered.  This service was performed under the direction of Francesco Palma MD.  CPT 58284-IG.  Follow up as needed           Francesco Palma MD    I, Carlos Farley, acted as a scribe for Francesco Palma MD for the duration of this office visit.

## 2023-09-27 NOTE — TELEPHONE ENCOUNTER
No care due was identified.  Health [facility] Embedded Care Due Messages. Reference number: [id number].   9/26/2023 10:04:04 PM CDT

## 2023-10-03 NOTE — PROGRESS NOTES
Orthopaedics Sports Medicine     Shoulder Initial Visit         10/3/2023    Referring MD: Self, Aaareferral    Chief Complaint   Patient presents with    Right Shoulder - Pain    Left Shoulder - Pain         History of Present Illness:   Cassandra Campos is a 73 y.o. right-hand dominant female who presents with left shoulder pain and dysfunction. Endorses a history of a fall in the last year but cannot recall a specific date.     Onset of the symptoms was 3-4 weeks ago     Inciting event: Fall onto left shoulder    Current symptoms include pain and lack of range of motion     Pain is aggravated by overhead movement, reaching movements       Evaluation to date: X-Ray     Treatment to date: Rest, activity modification, Muscle relaxer, ibuprofen, heat and ice    Past Medical History:   Past Medical History:   Diagnosis Date    Arthritis     hands    Bilateral bunions     Borderline glaucoma     De Quervain's disease (radial styloid tenosynovitis)     Gastritis     upper GI 2017    Hydradenitis     Hyperlipidemia     Hypertension     Insomnia     Migraines 2000    Nasal septum perforation     Obesity     Pneumonia     Restrictive airway disease     Sleep apnea     SVT (supraventricular tachycardia) 2013    Trigger finger     Type 2 diabetes mellitus      am 08/10/2021       Past Surgical History:   Past Surgical History:   Procedure Laterality Date    AXILLARY HIDRADENITIS EXCISION Bilateral     BONE EXOSTOSIS EXCISION Right 2018    Procedure: EXCISION, EXOSTOSIS;  Surgeon: Jayro Pedraza Sr., MD;  Location: [facility] OR;  Service: Orthopedics;  Laterality: Right;    BREAST BIOPSY Bilateral     both benign    BREAST SURGERY  1998    CARPAL TUNNEL RELEASE      bilateral    CATARACT EXTRACTION Bilateral     OU     SECTION  1979    CHOLECYSTECTOMY  2014    COLONOSCOPY N/A 10/02/2020    Procedure: COLONOSCOPY;  Surgeon: Tushar Edwards MD;  Location: [facility] ENDO;  Service: Endoscopy;   Laterality: N/A;    COLONOSCOPY W/ POLYPECTOMY  10/02/2020    Polyps x3, repeat 5 years; Tushar Edwards MD     CYST REMOVAL  07/2015    sebaceous cyst removed from face    DE QUERVAIN'S RELEASE Left 01/16/2020    Procedure: RELEASE, HAND, FOR DEQUERVAIN'S TENOSYNOVITIS;  Surgeon: Jaime Oswald MD;  Location: [facility];  Service: Orthopedics;  Laterality: Left;    DE QUERVAIN'S RELEASE Right 11/20/2020    Procedure: RELEASE, HAND, FOR DEQUERVAIN'S TENOSYNOVITIS;  Surgeon: Jaime Oswald MD;  Location: [facility];  Service: Orthopedics;  Laterality: Right;    EYE SURGERY      gastric sleeve  02/13/2017    Dr. Watson    KNEE SURGERY Right     OLECRANON BURSECTOMY Right 07/25/2018    Procedure: BURSECTOMY, OLECRANON;  Surgeon: Jayro Pedraza Sr., MD;  Location: [facility];  Service: Orthopedics;  Laterality: Right;    SURGICAL REMOVAL OF BUNION WITH OSTEOTOMY OF METATARSAL BONE Left 05/10/2019    Procedure: BUNIONECTOMY, WITH METATARSAL OSTEOTOMY;  Surgeon: Srinivasan Villanueva DPM;  Location: [facility];  Service: Podiatry;  Laterality: Left;    SURGICAL REMOVAL OF BUNION WITH OSTEOTOMY OF METATARSAL BONE Right 06/28/2019    Procedure: BUNIONECTOMY, WITH METATARSAL OSTEOTOMY;  Surgeon: Srinivasan Villanueva DPM;  Location: [facility];  Service: Podiatry;  Laterality: Right;    TONSILLECTOMY, ADENOIDECTOMY  1980s    TRIGGER FINGER RELEASE Right 04/01/2015    Dr. Pedraza       Medications:  Patient's Medications   New Prescriptions    No medications on file   Previous Medications    ACCU-CHEK GUIDE TEST STRIPS STRP    USE TO TEST TWICE A DAY    ALBUTEROL (PROVENTIL/VENTOLIN HFA) 90 MCG/ACTUATION INHALER    INHALE 2 PUFFS INTO THE LUNGS EVERY 4 HOURS AS NEEDED FOR WHEEZING OR SHORTNESS OF BREATH.    AMITRIPTYLINE (ELAVIL) 150 MG TAB    Take 1 tablet (150 mg total) by mouth every evening.    BLOOD GLUCOSE CONTROL, HIGH (TRUE METRIX LEVEL 3) SOLN    1 each by Other route once daily.    BLOOD-GLUCOSE METER MISC    1 each by  Misc.(Non-Drug; Combo Route) route 2 (two) times a day.    BLOOD-GLUCOSE SENSOR (DEXCOM G7 SENSOR) LUISA    1 Device by Misc.(Non-Drug; Combo Route) route continuous.    CHOLECALCIFEROL, VITAMIN D3, (VITAMIN D3) 25 MCG (1,000 UNIT) CAPSULE    Take 1,000 Units by mouth once daily.    ESZOPICLONE (LUNESTA) 3 MG TAB    Take 1 tablet (3 mg total) by mouth every evening.    LANCING DEVICE WITH LANCETS (ACCU-CHEK SOFT DEV LANCETS) KIT    1 each by Misc.(Non-Drug; Combo Route) route 2 (two) times a day.    LORAZEPAM (ATIVAN) 1 MG TABLET    TAKE 1 TABLET BY MOUTH TWICE A DAY    LOSARTAN (COZAAR) 25 MG TABLET    TAKE 1 TABLET BY MOUTH ONCE  DAILY    METOPROLOL SUCCINATE (TOPROL-XL) 50 MG 24 HR TABLET    Take 1 tablet (50 mg total) by mouth once daily.    OMEPRAZOLE (PRILOSEC) 20 MG CAPSULE    Take 1 capsule (20 mg total) by mouth once daily.    SEMAGLUTIDE (OZEMPIC) 2 MG/DOSE (8 MG/3 ML) PNIJ    Inject 2 mg into the skin every 7 days.    TRUEPLUS LANCETS 33 GAUGE MISC    TEST BLOOD SUGAR ONE TIME DAILY   Modified Medications    No medications on file   Discontinued Medications    No medications on file       Allergies:   Review of patient's allergies indicates:   Allergen Reactions    Statins-hmg-coa reductase inhibitors Other (See Comments)     Elevated liver functions       Social History:   Home town: [place], [place]  Alcohol use: She reports current alcohol use of about 1.0 standard drink of alcohol per week.  Tobacco use: She reports that she quit smoking about 11 years ago. Her smoking use included cigarettes. She started smoking about 53 years ago. She has a 21.0 pack-year smoking history. She has never used smokeless tobacco.    Review of systems:  History of recent illness, fevers, shakes, or chills: no  History of cardiac problems or chest pain: Yes  History of pulmonary problems or asthma: Yes  History of diabetes: no  History of prior dvt or clotting problems: no  History of sleep apnea: no      Physical  "Examination:  Estimated body mass index is 34.01 kg/m² as calculated from the following:    Height as of this encounter: 5' 3" (1.6 m).    Weight as of this encounter: 87.1 kg (192 lb).    General  Healthy appearing female in no acute distress  Alert and oriented, normal mood, appropriate affect    Shoulder Examination:  Patient is alert and oriented, no distress. Skin is intact. Neuro is normal with no focal motor or sensory findings.    Cervical exam is unremarkable. Intact cervical ROM. Negative Spurling's test    Physical Exam:  RIGHT    LEFT    Scap Dyskinesis/Winging (-)    (-)    Tenderness:          Greater Tuberosity             (-)    +  Bicipital Groove  (-)    (-)  AC joint   (-)    (-)  Other:     ROM:  Forward Elevation 180    100  Abduction  120    90  ER (at side)  80    30    Strength:   Supraspinatus  5/5    5/5  Infraspinatus  5/5    5/5  Subscap / IR  5/5    5/5     Special Tests:   Neer:    (-)    +   Whalen:   (-)    +   SS Stress:   (-)    +   Bear Hug:   (-)    (-)   [place]'s:   (-)    +   Resisted Thrower's:   (-)    +   Cross Arm Abduction:  (-)    (-)   Speeds test       +    Neurovascular examination  - Motor grossly intact bilaterally to shoulder abduction, elbow flexion and extension, wrist flexion and extension, and intrinsic hand musculature  - Sensation intact to light touch bilaterally in axillary, median, radial, and ulnar distributions  - Symmetrical radial pulses      Imaging:  XR Results:  Results for orders placed during the hospital encounter of 09/27/23    X-Ray Shoulder 2 or more views Bilat    Narrative  EXAM: XR SHOULDER COMPLETE 2 OR MORE VIEWS BILATERAL    CLINICAL HISTORY: [Pain]    FINDINGS: 8 views bilateral shoulders.  Mild AC joint degenerative changes.  No evidence of acute fracture or dislocation.  Normal mineralization.  Soft tissues are unremarkable.    Impression  No acute findings.    Finalized on: 9/27/2023 10:07 AM By:  Sergei Mcclure MD  BRRG# [id number]     "  2023-09-27 10:09:43.166    RG      MRI Results:  No results found for this or any previous visit.      CT Results:  No results found for this or any previous visit.      Physician Read: I agree with the above impression.      Impression:  73 y.o. female with bilateral shoulder subacromial impingement, rotator cuff tendinopathy      Plan:  Discussed diagnosis and treatment options with patient today. Her symptoms and physical exam was most consistent with bilateral shoulder subacromial impingement and rotator cuff tendinopathy.   Discussed non-operative treatment options in the form of rest, activity modifications, oral anti-inflammatories, corticosteroid injections, and physical therapy/physician directed home exercise program. At this time she has tried rest, activity modification, Muscle relaxer, ibuprofen, heat and ice  We discussed moving forward with corticosteroid injections into the subacromial space and referral to physical therapy or home exercise program. Patient elected to proceed with home exercise program. The patient is in agreement with this plan.   Bilateral shoulder corticosteroid injections into the subacromial space performed today and patient tolerated the procedure well with no immediate complications.  At least 15 minutes were spent developing, teaching, and performing a home exercise program.  A written summary was provided and all questions were answered.  This service was performed under the direction of Francesco Palma MD.  CPT 15874-SU.    Follow up as needed. Discussed that if her symptoms persisted following these treatments we would likely proceed with MRI.            Francesco Palma MD    I, Carlos Farley, acted as a scribe for Francesco Palma MD for the duration of this office visit.

## 2023-10-13 RX ORDER — TRIAMCINOLONE ACETONIDE 40 MG/ML
40 INJECTION, SUSPENSION INTRA-ARTICULAR; INTRAMUSCULAR
Status: DISCONTINUED | OUTPATIENT
Start: 2023-09-27 | End: 2023-10-13 | Stop reason: HOSPADM

## 2023-10-13 NOTE — PROCEDURES
Large Joint Aspiration/Injection: bilateral subacromial bursa    Date/Time: 9/27/2023 10:45 AM    Performed by: Francesco Palma MD  Authorized by: Francesco Palma MD    Consent Done?:  Yes (Verbal)  Indications:  Pain  Site marked: the procedure site was marked    Timeout: prior to procedure the correct patient, procedure, and site was verified    Prep: patient was prepped and draped in usual sterile fashion      Local anesthesia used?: Yes    Anesthesia:  Local infiltration  Local anesthetic:  Lidocaine 1% without epinephrine    Details:  Needle Size:  22 G  Ultrasonic Guidance for needle placement?: No    Approach:  Posterior  Location:  Shoulder  Laterality:  Bilateral  Site:  Bilateral subacromial bursa  Medications (Right):  40 mg triamcinolone acetonide 40 mg/mL  Medications (Left):  40 mg triamcinolone acetonide 40 mg/mL  Patient tolerance:  Patient tolerated the procedure well with no immediate complications

## 2023-10-23 ENCOUNTER — TELEPHONE (OUTPATIENT)
Dept: SPORTS MEDICINE | Facility: CLINIC | Age: 73
End: 2023-10-23
Payer: MEDICARE

## 2023-10-23 ENCOUNTER — TELEPHONE (OUTPATIENT)
Dept: ORTHOPEDICS | Facility: CLINIC | Age: 73
End: 2023-10-23
Payer: MEDICARE

## 2023-10-23 RX ORDER — ESZOPICLONE 3 MG/1
3 TABLET, FILM COATED ORAL NIGHTLY
Qty: 30 TABLET | Refills: 2 | Status: SHIPPED | OUTPATIENT
Start: 2023-10-23 | End: 2024-01-17 | Stop reason: SDUPTHER

## 2023-10-23 NOTE — TELEPHONE ENCOUNTER
Attempted to return the patients phone call there was no answer so I LVM         ----- Message from Silas Rizvi sent at 10/23/2023 10:47 AM CDT -----  Contact: Cassandra Trujillo is needing a call back in regards to scheduling an injection. Please give her a call back at [phone]

## 2023-10-23 NOTE — TELEPHONE ENCOUNTER
Care Due:                  Date            Visit Type   Department     Provider  --------------------------------------------------------------------------------                                EP -                              PRIMARY      [facility] INTERNAL  Last Visit: 07-      CARE ([facility])   ANA Zhang                              EP -                              PRIMARY      [facility] INTERNAL  Next Visit: 01-      CARE ([facility])   ANA Zhang                                                            Last  Test          Frequency    Reason                     Performed    Due Date  --------------------------------------------------------------------------------    HBA1C.......  6 months...  semaglutide..............  04-   10-    Health [facility] Embedded Care Due Messages. Reference number: [id number].   10/23/2023 11:12:34 AM CDT

## 2023-10-23 NOTE — TELEPHONE ENCOUNTER
Reached out to pt about her message to get her appointment moved up to a sooner date, pt did not answer LVM for a return call

## 2023-10-30 ENCOUNTER — TELEPHONE (OUTPATIENT)
Dept: ORTHOPEDICS | Facility: CLINIC | Age: 73
End: 2023-10-30
Payer: MEDICARE

## 2023-10-30 NOTE — TELEPHONE ENCOUNTER
Attempted to return the patients phone call there was no answer so I left a voicemail stating that Dr. Oswald is going to be on vacation so right now December is his next available appointment       ----- Message from Taylor Blackwell sent at 10/30/2023  8:19 AM CDT -----  Contact: Cassandra  Type:  Same Day Appointment Request    Caller is requesting a same day appointment.  Caller declined first available appointment listed below.    Name of Caller:Cassandra  When is the first available appointment?12/6  Symptoms:hand pain, cannot lift hand  Best Call Back Number:[phone]  Additional Information: MRN: [id number]\Margret Campos    Thanks  TS

## 2023-11-05 NOTE — PROGRESS NOTES
"Orthopaedic Follow-Up Visit    Last Appointment: 9/27/23  Diagnosis: Bilateral shoulder subacromial impingement, rotator cuff tendinopathy  Prior Procedure: Bi-L SAS CSI and HEP    Cassandra Campos is a 73 y.o. female who is here for f/u evaluation of her right shoulder. The patient was last seen here by me on 9/27/23 at which her symptoms and physical exam were most consistent with bilateral shoulder subacromial impingement and rotator cuff tendinopathy. She had tried rest, activity modification, Muscle relaxer, ibuprofen, heat and ice. We proceeded with corticosteroid injections into the subacromial space and home exercise program. The patient returns today reporting minimal relief from the injections and home exercise program. She also uses Tylenol as needed but reports that this does not provide her with much relief. Her symptoms have persisted and is interested in discussing further treatment options. She also reports some new left hand pain in the area of her MCP joint. She notes a little "bump" in this area.     To review her history, Cassandra Campos is a 73 y.o. right-hand dominant female who presented on 9/27/23 with left shoulder pain and dysfunction that had started 3-4 weeks prior due to a fall onto the left shoulder. Her symptoms included pain and lack of range of motion. Her pain was aggravated by overhead movement, reaching movements. She had tried rest, activity modification, Muscle relaxer, ibuprofen, heat and ice.     Patient's medications, allergies, past medical, surgical, social and family histories were reviewed and updated as appropriate.    Review of Systems   All systems reviewed were negative.  Specifically, the patient denies fever, chills, weight loss, chest pain, shortness of breath, or dyspnea on exertion.      Past Medical History:   Diagnosis Date    Arthritis     hands    Bilateral bunions     Borderline glaucoma     De Quervain's disease (radial styloid tenosynovitis)     " Gastritis     upper GI 2017    Hydradenitis     Hyperlipidemia     Hypertension     Insomnia     Migraines 2000    Nasal septum perforation     Obesity     Pneumonia     Restrictive airway disease     Sleep apnea     SVT (supraventricular tachycardia) 2013    Trigger finger     Type 2 diabetes mellitus 2012     am 08/10/2021       Past Surgical History:   Procedure Laterality Date    AXILLARY HIDRADENITIS EXCISION Bilateral     BONE EXOSTOSIS EXCISION Right 2018    Procedure: EXCISION, EXOSTOSIS;  Surgeon: Jayro Pedraza Sr., MD;  Location: [facility];  Service: Orthopedics;  Laterality: Right;    BREAST BIOPSY Bilateral     both benign    BREAST SURGERY  1998    CARPAL TUNNEL RELEASE      bilateral    CATARACT EXTRACTION Bilateral     OU     SECTION  1979    CHOLECYSTECTOMY  2014    COLONOSCOPY N/A 10/02/2020    Procedure: COLONOSCOPY;  Surgeon: Tushar Edwards MD;  Location: [facility];  Service: Endoscopy;  Laterality: N/A;    COLONOSCOPY W/ POLYPECTOMY  10/02/2020    Polyps x3, repeat 5 years; Tushar Edwards MD     CYST REMOVAL  2015    sebaceous cyst removed from face    DE QUERVAIN'S RELEASE Left 2020    Procedure: RELEASE, HAND, FOR DEQUERVAIN'S TENOSYNOVITIS;  Surgeon: Jaime Oswald MD;  Location: [facility] OR;  Service: Orthopedics;  Laterality: Left;    DE QUERVAIN'S RELEASE Right 2020    Procedure: RELEASE, HAND, FOR DEQUERVAIN'S TENOSYNOVITIS;  Surgeon: Jaime Oswald MD;  Location: [facility];  Service: Orthopedics;  Laterality: Right;    EYE SURGERY      gastric sleeve  2017    Dr. Watson    KNEE SURGERY Right     OLECRANON BURSECTOMY Right 2018    Procedure: BURSECTOMY, OLECRANON;  Surgeon: Jayro Pedraza Sr., MD;  Location: [facility];  Service: Orthopedics;  Laterality: Right;    SURGICAL REMOVAL OF BUNION WITH OSTEOTOMY OF METATARSAL BONE Left 05/10/2019    Procedure: BUNIONECTOMY, WITH METATARSAL OSTEOTOMY;  Surgeon: Srinivasan Villanueva,  RICCO;  Location: [facility];  Service: Podiatry;  Laterality: Left;    SURGICAL REMOVAL OF BUNION WITH OSTEOTOMY OF METATARSAL BONE Right 06/28/2019    Procedure: BUNIONECTOMY, WITH METATARSAL OSTEOTOMY;  Surgeon: Srinivasan Villanueva DPM;  Location: [facility] OR;  Service: Podiatry;  Laterality: Right;    TONSILLECTOMY, ADENOIDECTOMY  1980s    TRIGGER FINGER RELEASE Right 04/01/2015    Dr. Pedraza       Patient's Medications   New Prescriptions    No medications on file   Previous Medications    ACCU-CHEK GUIDE TEST STRIPS STRP    USE TO TEST TWICE A DAY    ALBUTEROL (PROVENTIL/VENTOLIN HFA) 90 MCG/ACTUATION INHALER    INHALE 2 PUFFS INTO THE LUNGS EVERY 4 HOURS AS NEEDED FOR WHEEZING OR SHORTNESS OF BREATH.    AMITRIPTYLINE (ELAVIL) 150 MG TAB    Take 1 tablet (150 mg total) by mouth every evening.    BLOOD GLUCOSE CONTROL, HIGH (TRUE METRIX LEVEL 3) SOLN    1 each by Other route once daily.    BLOOD-GLUCOSE METER MISC    1 each by Misc.(Non-Drug; Combo Route) route 2 (two) times a day.    BLOOD-GLUCOSE SENSOR (DEXCOM G7 SENSOR) LUISA    1 Device by Misc.(Non-Drug; Combo Route) route continuous.    CHOLECALCIFEROL, VITAMIN D3, (VITAMIN D3) 25 MCG (1,000 UNIT) CAPSULE    Take 1,000 Units by mouth once daily.    ESZOPICLONE (LUNESTA) 3 MG TAB    Take 1 tablet (3 mg total) by mouth every evening.    LANCING DEVICE WITH LANCETS (ACCU-CHEK SOFT DEV LANCETS) KIT    1 each by Misc.(Non-Drug; Combo Route) route 2 (two) times a day.    LORAZEPAM (ATIVAN) 1 MG TABLET    TAKE 1 TABLET BY MOUTH TWICE A DAY    LOSARTAN (COZAAR) 25 MG TABLET    TAKE 1 TABLET BY MOUTH ONCE  DAILY    METOPROLOL SUCCINATE (TOPROL-XL) 50 MG 24 HR TABLET    Take 1 tablet (50 mg total) by mouth once daily.    OMEPRAZOLE (PRILOSEC) 20 MG CAPSULE    Take 1 capsule (20 mg total) by mouth once daily.    SEMAGLUTIDE (OZEMPIC) 2 MG/DOSE (8 MG/3 ML) PNIJ    Inject 2 mg into the skin every 7 days.    TRUEPLUS LANCETS 33 GAUGE MISC    TEST BLOOD SUGAR ONE TIME DAILY    Modified Medications    No medications on file   Discontinued Medications    No medications on file       Family History   Problem Relation Age of Onset    Prostate cancer Brother     Diabetes Maternal Aunt     Diabetes Cousin     Hypertension Maternal Grandmother        Review of patient's allergies indicates:   Allergen Reactions    Statins-hmg-coa reductase inhibitors Other (See Comments)     Elevated liver functions         Objective:      Physical Exam  Patient is alert and oriented, no distress. Skin is intact. Neuro is normal with no focal motor or sensory findings.    Cervical exam is unremarkable. Intact cervical ROM. Negative Spurling's test    Physical Exam:                       RIGHT                                     LEFT     Scap Dyskinesis/Winging       (-)                                             (-)     Tenderness:                                                                              Greater Tuberosity                  (-)                                            +  Bicipital Groove                       (-)                                             (-)  AC joint                                   (-)                                             (-)  Other:      ROM:  Forward Elevation       180                                          140  Abduction                    120                                          100  ER (at side)                 80                                            50     Strength:   Supraspinatus             5/5                                           4/5  Infraspinatus               5/5                                           4/5  Subscap / IR               5/5                                           5/5      Special Tests:              Neer:                                       (-)                                             +              Whalen:                                 (-)                                             +              SS  Stress:                               (-)                                             +              Bear Hug:                                (-)                                             (-)              Ventura's:                                 (-)                                             +              Resisted Thrower's:                (-)                                             +              Cross Arm Abduction:             (-)                                             (-)              Speeds test                                                                             +    Neurovascular examination  - Motor grossly intact bilaterally to shoulder abduction, elbow flexion and extension, wrist flexion and extension, and intrinsic hand musculature  - Sensation intact to light touch bilaterally in axillary, median, radial, and ulnar distributions  - Symmetrical radial pulses    Imaging:    XR Results:  Results for orders placed during the hospital encounter of 09/27/23    X-Ray Shoulder 2 or more views Bilat    Narrative  EXAM: XR SHOULDER COMPLETE 2 OR MORE VIEWS BILATERAL    CLINICAL HISTORY: [Pain]    FINDINGS: 8 views bilateral shoulders.  Mild AC joint degenerative changes.  No evidence of acute fracture or dislocation.  Normal mineralization.  Soft tissues are unremarkable.    Impression  No acute findings.    Finalized on: 9/27/2023 10:07 AM By:  Sergei Mcclure MD  [facility]# [id number]      2023-09-27 10:09:[id number]    [facility]      MRI Results:  No results found for this or any previous visit.      CT Results:  No results found for this or any previous visit.      Physician read: I agree with the above impression.    Assessment/Plan:   Cassandra Campos is a 73 y.o. female with chronic left shoulder pain, suspected rotator cuff tear     Plan:    She continues to have left shoulder pain and symptoms despite over 6 weeks of conservative treatment that has consisted of rest, activity modification, Tylenol,  corticosteroid injection and home exercise program. These treatments have only provided her with minimal relief thus far.   Due to her continued symptoms, I recommend proceeding with MRI of the left shoulder for further evaluation   We will also start her on Mobic to try and help her pain at night.   Follow up with me after MRI.           Francesco Palma MD    I, Toñito Martinez, acted as a scribe for Francesco Palma MD for the duration of this office visit.

## 2023-11-07 ENCOUNTER — OFFICE VISIT (OUTPATIENT)
Dept: SPORTS MEDICINE | Facility: CLINIC | Age: 73
End: 2023-11-07
Payer: MEDICARE

## 2023-11-07 VITALS — HEIGHT: 63 IN | BODY MASS INDEX: 34.02 KG/M2 | WEIGHT: 192 LBS | RESPIRATION RATE: 17 BRPM

## 2023-11-07 DIAGNOSIS — G89.29 CHRONIC LEFT SHOULDER PAIN: Primary | ICD-10-CM

## 2023-11-07 DIAGNOSIS — M25.512 CHRONIC LEFT SHOULDER PAIN: Primary | ICD-10-CM

## 2023-11-07 DIAGNOSIS — M67.912 TENDINOPATHY OF ROTATOR CUFF, LEFT: ICD-10-CM

## 2023-11-07 PROCEDURE — 1101F PR PT FALLS ASSESS DOC 0-1 FALLS W/OUT INJ PAST YR: ICD-10-PCS | Mod: CPTII,S$GLB,, | Performed by: STUDENT IN AN ORGANIZED HEALTH CARE EDUCATION/TRAINING PROGRAM

## 2023-11-07 PROCEDURE — 3288F FALL RISK ASSESSMENT DOCD: CPT | Mod: CPTII,S$GLB,, | Performed by: STUDENT IN AN ORGANIZED HEALTH CARE EDUCATION/TRAINING PROGRAM

## 2023-11-07 PROCEDURE — 3066F PR DOCUMENTATION OF TREATMENT FOR NEPHROPATHY: ICD-10-PCS | Mod: CPTII,S$GLB,, | Performed by: STUDENT IN AN ORGANIZED HEALTH CARE EDUCATION/TRAINING PROGRAM

## 2023-11-07 PROCEDURE — 3288F PR FALLS RISK ASSESSMENT DOCUMENTED: ICD-10-PCS | Mod: CPTII,S$GLB,, | Performed by: STUDENT IN AN ORGANIZED HEALTH CARE EDUCATION/TRAINING PROGRAM

## 2023-11-07 PROCEDURE — 3044F PR MOST RECENT HEMOGLOBIN A1C LEVEL <7.0%: ICD-10-PCS | Mod: CPTII,S$GLB,, | Performed by: STUDENT IN AN ORGANIZED HEALTH CARE EDUCATION/TRAINING PROGRAM

## 2023-11-07 PROCEDURE — 3008F BODY MASS INDEX DOCD: CPT | Mod: CPTII,S$GLB,, | Performed by: STUDENT IN AN ORGANIZED HEALTH CARE EDUCATION/TRAINING PROGRAM

## 2023-11-07 PROCEDURE — 1159F MED LIST DOCD IN RCRD: CPT | Mod: CPTII,S$GLB,, | Performed by: STUDENT IN AN ORGANIZED HEALTH CARE EDUCATION/TRAINING PROGRAM

## 2023-11-07 PROCEDURE — 99214 OFFICE O/P EST MOD 30 MIN: CPT | Mod: S$GLB,,, | Performed by: STUDENT IN AN ORGANIZED HEALTH CARE EDUCATION/TRAINING PROGRAM

## 2023-11-07 PROCEDURE — 1159F PR MEDICATION LIST DOCUMENTED IN MEDICAL RECORD: ICD-10-PCS | Mod: CPTII,S$GLB,, | Performed by: STUDENT IN AN ORGANIZED HEALTH CARE EDUCATION/TRAINING PROGRAM

## 2023-11-07 PROCEDURE — 3061F PR NEG MICROALBUMINURIA RESULT DOCUMENTED/REVIEW: ICD-10-PCS | Mod: CPTII,S$GLB,, | Performed by: STUDENT IN AN ORGANIZED HEALTH CARE EDUCATION/TRAINING PROGRAM

## 2023-11-07 PROCEDURE — 99999 PR PBB SHADOW E&M-EST. PATIENT-LVL IV: ICD-10-PCS | Mod: PBBFAC,,, | Performed by: STUDENT IN AN ORGANIZED HEALTH CARE EDUCATION/TRAINING PROGRAM

## 2023-11-07 PROCEDURE — 1125F AMNT PAIN NOTED PAIN PRSNT: CPT | Mod: CPTII,S$GLB,, | Performed by: STUDENT IN AN ORGANIZED HEALTH CARE EDUCATION/TRAINING PROGRAM

## 2023-11-07 PROCEDURE — 4010F ACE/ARB THERAPY RXD/TAKEN: CPT | Mod: CPTII,S$GLB,, | Performed by: STUDENT IN AN ORGANIZED HEALTH CARE EDUCATION/TRAINING PROGRAM

## 2023-11-07 PROCEDURE — 3066F NEPHROPATHY DOC TX: CPT | Mod: CPTII,S$GLB,, | Performed by: STUDENT IN AN ORGANIZED HEALTH CARE EDUCATION/TRAINING PROGRAM

## 2023-11-07 PROCEDURE — 3061F NEG MICROALBUMINURIA REV: CPT | Mod: CPTII,S$GLB,, | Performed by: STUDENT IN AN ORGANIZED HEALTH CARE EDUCATION/TRAINING PROGRAM

## 2023-11-07 PROCEDURE — 99214 PR OFFICE/OUTPT VISIT, EST, LEVL IV, 30-39 MIN: ICD-10-PCS | Mod: S$GLB,,, | Performed by: STUDENT IN AN ORGANIZED HEALTH CARE EDUCATION/TRAINING PROGRAM

## 2023-11-07 PROCEDURE — 3008F PR BODY MASS INDEX (BMI) DOCUMENTED: ICD-10-PCS | Mod: CPTII,S$GLB,, | Performed by: STUDENT IN AN ORGANIZED HEALTH CARE EDUCATION/TRAINING PROGRAM

## 2023-11-07 PROCEDURE — 1101F PT FALLS ASSESS-DOCD LE1/YR: CPT | Mod: CPTII,S$GLB,, | Performed by: STUDENT IN AN ORGANIZED HEALTH CARE EDUCATION/TRAINING PROGRAM

## 2023-11-07 PROCEDURE — 1125F PR PAIN SEVERITY QUANTIFIED, PAIN PRESENT: ICD-10-PCS | Mod: CPTII,S$GLB,, | Performed by: STUDENT IN AN ORGANIZED HEALTH CARE EDUCATION/TRAINING PROGRAM

## 2023-11-07 PROCEDURE — 3044F HG A1C LEVEL LT 7.0%: CPT | Mod: CPTII,S$GLB,, | Performed by: STUDENT IN AN ORGANIZED HEALTH CARE EDUCATION/TRAINING PROGRAM

## 2023-11-07 PROCEDURE — 99999 PR PBB SHADOW E&M-EST. PATIENT-LVL IV: CPT | Mod: PBBFAC,,, | Performed by: STUDENT IN AN ORGANIZED HEALTH CARE EDUCATION/TRAINING PROGRAM

## 2023-11-07 PROCEDURE — 1160F RVW MEDS BY RX/DR IN RCRD: CPT | Mod: CPTII,S$GLB,, | Performed by: STUDENT IN AN ORGANIZED HEALTH CARE EDUCATION/TRAINING PROGRAM

## 2023-11-07 PROCEDURE — 1160F PR REVIEW ALL MEDS BY PRESCRIBER/CLIN PHARMACIST DOCUMENTED: ICD-10-PCS | Mod: CPTII,S$GLB,, | Performed by: STUDENT IN AN ORGANIZED HEALTH CARE EDUCATION/TRAINING PROGRAM

## 2023-11-07 PROCEDURE — 4010F PR ACE/ARB THEARPY RXD/TAKEN: ICD-10-PCS | Mod: CPTII,S$GLB,, | Performed by: STUDENT IN AN ORGANIZED HEALTH CARE EDUCATION/TRAINING PROGRAM

## 2023-11-07 RX ORDER — MELOXICAM 15 MG/1
15 TABLET ORAL DAILY
Qty: 30 TABLET | Refills: 1 | Status: SHIPPED | OUTPATIENT
Start: 2023-11-07 | End: 2024-02-01

## 2023-11-18 ENCOUNTER — HOSPITAL ENCOUNTER (OUTPATIENT)
Dept: RADIOLOGY | Facility: HOSPITAL | Age: 73
Discharge: HOME | End: 2023-11-18
Attending: STUDENT IN AN ORGANIZED HEALTH CARE EDUCATION/TRAINING PROGRAM
Payer: MEDICARE

## 2023-11-18 DIAGNOSIS — M25.512 CHRONIC LEFT SHOULDER PAIN: ICD-10-CM

## 2023-11-18 DIAGNOSIS — G89.29 CHRONIC LEFT SHOULDER PAIN: ICD-10-CM

## 2023-11-18 PROCEDURE — 73221 MRI JOINT UPR EXTREM W/O DYE: CPT | Mod: 26,LT,, | Performed by: RADIOLOGY

## 2023-11-18 PROCEDURE — 73221 MRI JOINT UPR EXTREM W/O DYE: CPT | Mod: TC,LT

## 2023-11-18 PROCEDURE — 73221 MRI SHOULDER WITHOUT CONTRAST LEFT: ICD-10-PCS | Mod: 26,LT,, | Performed by: RADIOLOGY

## 2023-11-18 NOTE — PROGRESS NOTES
Orthopaedic Follow-Up Visit    Last Appointment: 11/7/23  Diagnosis: Chronic left shoulder pain, suspected rotator cuff tear   Prior Procedure: MRI and Mobic    Cassandra Campos is a 73 y.o. female who is here for f/u evaluation of her right shoulder. The patient was last seen here by me on 11/7/23 at which point she continued to have left shoulder pain and symptoms despite treatments of rest, activity modification, Tylenol, corticosteroid injection and home exercise program, so we proceeded with MRI of the left shoulder for further evaluation. We also started her on Mobic to try and help her pain at night. The patient returns today to review her MRI and discuss further treatment options.     To review her history, Cassandra Campos is a 73 y.o. right-hand dominant female who presented on 9/27/23 with left shoulder pain and dysfunction that had started 3-4 weeks prior due to a fall onto the left shoulder. Her symptoms included pain and lack of range of motion. Her pain was aggravated by overhead movement, reaching movements. She had tried rest, activity modification, Muscle relaxer, ibuprofen, heat and ice. Her symptoms and physical exam were most consistent with bilateral shoulder subacromial impingement and rotator cuff tendinopathy. She had tried rest, activity modification, Muscle relaxer, ibuprofen, heat and ice. We proceeded with corticosteroid injections into the subacromial space and home exercise program.    Patient's medications, allergies, past medical, surgical, social and family histories were reviewed and updated as appropriate.    Review of Systems   All systems reviewed were negative.  Specifically, the patient denies fever, chills, weight loss, chest pain, shortness of breath, or dyspnea on exertion.      Past Medical History:   Diagnosis Date    Arthritis     hands    Bilateral bunions     Borderline glaucoma     De Quervain's disease (radial styloid tenosynovitis)     Gastritis     upper GI  2017    Hydradenitis     Hyperlipidemia     Hypertension     Insomnia     Migraines 2000    Nasal septum perforation     Obesity     Pneumonia     Restrictive airway disease     Sleep apnea     SVT (supraventricular tachycardia) 2013    Trigger finger     Type 2 diabetes mellitus 2012     am 08/10/2021       Past Surgical History:   Procedure Laterality Date    AXILLARY HIDRADENITIS EXCISION Bilateral     BONE EXOSTOSIS EXCISION Right 2018    Procedure: EXCISION, EXOSTOSIS;  Surgeon: Jayro Pedraza Sr., MD;  Location: [facility] OR;  Service: Orthopedics;  Laterality: Right;    BREAST BIOPSY Bilateral     both benign    BREAST SURGERY  1998    CARPAL TUNNEL RELEASE      bilateral    CATARACT EXTRACTION Bilateral     OU     SECTION  1979    CHOLECYSTECTOMY  2014    COLONOSCOPY N/A 10/02/2020    Procedure: COLONOSCOPY;  Surgeon: Tushar Edwards MD;  Location: [facility];  Service: Endoscopy;  Laterality: N/A;    COLONOSCOPY W/ POLYPECTOMY  10/02/2020    Polyps x3, repeat 5 years; Tushar Edwards MD     CYST REMOVAL  2015    sebaceous cyst removed from face    DE QUERVAIN'S RELEASE Left 2020    Procedure: RELEASE, HAND, FOR DEQUERVAIN'S TENOSYNOVITIS;  Surgeon: Jaime Oswald MD;  Location: [facility] OR;  Service: Orthopedics;  Laterality: Left;    DE QUERVAIN'S RELEASE Right 2020    Procedure: RELEASE, HAND, FOR DEQUERVAIN'S TENOSYNOVITIS;  Surgeon: Jaime Oswald MD;  Location: [facility];  Service: Orthopedics;  Laterality: Right;    EYE SURGERY      gastric sleeve  2017    Dr. Watson    KNEE SURGERY Right     OLECRANON BURSECTOMY Right 2018    Procedure: BURSECTOMY, OLECRANON;  Surgeon: Jayro Pedraza Sr., MD;  Location: [facility] OR;  Service: Orthopedics;  Laterality: Right;    SURGICAL REMOVAL OF BUNION WITH OSTEOTOMY OF METATARSAL BONE Left 05/10/2019    Procedure: BUNIONECTOMY, WITH METATARSAL OSTEOTOMY;  Surgeon: Srinivasan Villanueva DPM;  Location: [facility]  OR;  Service: Podiatry;  Laterality: Left;    SURGICAL REMOVAL OF BUNION WITH OSTEOTOMY OF METATARSAL BONE Right 06/28/2019    Procedure: BUNIONECTOMY, WITH METATARSAL OSTEOTOMY;  Surgeon: Srinivasan Villanueva DPM;  Location: [facility] OR;  Service: Podiatry;  Laterality: Right;    TONSILLECTOMY, ADENOIDECTOMY  1980s    TRIGGER FINGER RELEASE Right 04/01/2015    Dr. Pedraza       Patient's Medications   New Prescriptions    No medications on file   Previous Medications    ACCU-CHEK GUIDE TEST STRIPS STRP    USE TO TEST TWICE A DAY    ALBUTEROL (PROVENTIL/VENTOLIN HFA) 90 MCG/ACTUATION INHALER    INHALE 2 PUFFS INTO THE LUNGS EVERY 4 HOURS AS NEEDED FOR WHEEZING OR SHORTNESS OF BREATH.    AMITRIPTYLINE (ELAVIL) 150 MG TAB    Take 1 tablet (150 mg total) by mouth every evening.    BLOOD GLUCOSE CONTROL, HIGH (TRUE METRIX LEVEL 3) SOLN    1 each by Other route once daily.    BLOOD-GLUCOSE METER MISC    1 each by Misc.(Non-Drug; Combo Route) route 2 (two) times a day.    BLOOD-GLUCOSE SENSOR (DEXCOM G7 SENSOR) LUISA    1 Device by Misc.(Non-Drug; Combo Route) route continuous.    CHOLECALCIFEROL, VITAMIN D3, (VITAMIN D3) 25 MCG (1,000 UNIT) CAPSULE    Take 1,000 Units by mouth once daily.    ESZOPICLONE (LUNESTA) 3 MG TAB    Take 1 tablet (3 mg total) by mouth every evening.    LANCING DEVICE WITH LANCETS (ACCU-CHEK SOFT DEV LANCETS) KIT    1 each by Misc.(Non-Drug; Combo Route) route 2 (two) times a day.    LORAZEPAM (ATIVAN) 1 MG TABLET    TAKE 1 TABLET BY MOUTH TWICE A DAY    LOSARTAN (COZAAR) 25 MG TABLET    TAKE 1 TABLET BY MOUTH ONCE  DAILY    MELOXICAM (MOBIC) 15 MG TABLET    Take 1 tablet (15 mg total) by mouth once daily.    METOPROLOL SUCCINATE (TOPROL-XL) 50 MG 24 HR TABLET    Take 1 tablet (50 mg total) by mouth once daily.    OMEPRAZOLE (PRILOSEC) 20 MG CAPSULE    Take 1 capsule (20 mg total) by mouth once daily.    SEMAGLUTIDE (OZEMPIC) 2 MG/DOSE (8 MG/3 ML) PNIJ    Inject 2 mg into the skin every 7 days.    TRUEPLUS  LANCETS 33 GAUGE MISC    TEST BLOOD SUGAR ONE TIME DAILY   Modified Medications    No medications on file   Discontinued Medications    No medications on file       Family History   Problem Relation Age of Onset    Prostate cancer Brother     Diabetes Maternal Aunt     Diabetes Cousin     Hypertension Maternal Grandmother        Review of patient's allergies indicates:   Allergen Reactions    Statins-hmg-coa reductase inhibitors Other (See Comments)     Elevated liver functions         Objective:      Physical Exam  Patient is alert and oriented, no distress. Skin is intact. Neuro is normal with no focal motor or sensory findings.    Cervical exam is unremarkable. Intact cervical ROM. Negative Spurling's test    Physical Exam:                       RIGHT                                     LEFT     Scap Dyskinesis/Winging       (-)                                             (-)     Tenderness:                                                                              Greater Tuberosity                  (-)                                            +  Bicipital Groove                       (-)                                             (-)  AC joint                                   (-)                                             (-)  Other:      ROM:  Forward Elevation       180                                          140  Abduction                    120                                          100  ER (at side)                 80                                            50     Strength:   Supraspinatus             5/5                                           4/5  Infraspinatus               5/5                                           4/5  Subscap / IR               5/5                                           5/5      Special Tests:              Neer:                                       (-)                                             +              Whalen:                                 (-)                                              +              SS Stress:                               (-)                                             +              Bear Hug:                                (-)                                             (-)              Bartholomew's:                                 (-)                                             +              Resisted Thrower's:                (-)                                             +              Cross Arm Abduction:             (-)                                             (-)              Speeds test                                                                             +    Neurovascular examination  - Motor grossly intact bilaterally to shoulder abduction, elbow flexion and extension, wrist flexion and extension, and intrinsic hand musculature  - Sensation intact to light touch bilaterally in axillary, median, radial, and ulnar distributions  - Symmetrical radial pulses    Imaging:    XR Results:  Results for orders placed during the hospital encounter of 09/27/23    X-Ray Shoulder 2 or more views Bilat    Narrative  EXAM: XR SHOULDER COMPLETE 2 OR MORE VIEWS BILATERAL    CLINICAL HISTORY: [Pain]    FINDINGS: 8 views bilateral shoulders.  Mild AC joint degenerative changes.  No evidence of acute fracture or dislocation.  Normal mineralization.  Soft tissues are unremarkable.    Impression  No acute findings.    Finalized on: 9/27/2023 10:07 AM By:  Sergei Mcclure MD  [facility]# [id number]      2023-09-27 10:09:43.166    [facility]      MRI Results:  ***      CT Results:  No results found for this or any previous visit.      Physician read: I agree with the above impression.    Assessment/Plan:   Cassandra Campos is a 73 y.o. female with chronic left shoulder pain, suspected rotator cuff tear     Plan:    Reviewed MRI with the patient today. Her MRI shows ***  Follow up ***          Francesco Palma MD    I, Toñito Martinez, acted as a scribe for  Francesco Palma MD for the duration of this office visit.

## 2023-11-21 ENCOUNTER — OFFICE VISIT (OUTPATIENT)
Dept: SPORTS MEDICINE | Facility: CLINIC | Age: 73
End: 2023-11-21
Payer: MEDICARE

## 2023-11-21 DIAGNOSIS — M67.814 TENDINOSIS OF LEFT ROTATOR CUFF: Primary | ICD-10-CM

## 2023-11-21 DIAGNOSIS — M67.813 TENDINOSIS OF RIGHT ROTATOR CUFF: ICD-10-CM

## 2023-11-21 DIAGNOSIS — M54.12 CERVICAL RADICULOPATHY: ICD-10-CM

## 2023-11-21 PROCEDURE — 3061F NEG MICROALBUMINURIA REV: CPT | Mod: CPTII,S$GLB,, | Performed by: STUDENT IN AN ORGANIZED HEALTH CARE EDUCATION/TRAINING PROGRAM

## 2023-11-21 PROCEDURE — 1100F PR PT FALLS ASSESS DOC 2+ FALLS/FALL W/INJURY/YR: ICD-10-PCS | Mod: CPTII,S$GLB,, | Performed by: STUDENT IN AN ORGANIZED HEALTH CARE EDUCATION/TRAINING PROGRAM

## 2023-11-21 PROCEDURE — 97110 PR THERAPEUTIC EXERCISES: ICD-10-PCS | Mod: S$GLB,,, | Performed by: STUDENT IN AN ORGANIZED HEALTH CARE EDUCATION/TRAINING PROGRAM

## 2023-11-21 PROCEDURE — 3044F HG A1C LEVEL LT 7.0%: CPT | Mod: CPTII,S$GLB,, | Performed by: STUDENT IN AN ORGANIZED HEALTH CARE EDUCATION/TRAINING PROGRAM

## 2023-11-21 PROCEDURE — 97110 THERAPEUTIC EXERCISES: CPT | Mod: S$GLB,,, | Performed by: STUDENT IN AN ORGANIZED HEALTH CARE EDUCATION/TRAINING PROGRAM

## 2023-11-21 PROCEDURE — 1159F MED LIST DOCD IN RCRD: CPT | Mod: CPTII,S$GLB,, | Performed by: STUDENT IN AN ORGANIZED HEALTH CARE EDUCATION/TRAINING PROGRAM

## 2023-11-21 PROCEDURE — 3044F PR MOST RECENT HEMOGLOBIN A1C LEVEL <7.0%: ICD-10-PCS | Mod: CPTII,S$GLB,, | Performed by: STUDENT IN AN ORGANIZED HEALTH CARE EDUCATION/TRAINING PROGRAM

## 2023-11-21 PROCEDURE — 3066F PR DOCUMENTATION OF TREATMENT FOR NEPHROPATHY: ICD-10-PCS | Mod: CPTII,S$GLB,, | Performed by: STUDENT IN AN ORGANIZED HEALTH CARE EDUCATION/TRAINING PROGRAM

## 2023-11-21 PROCEDURE — 3061F PR NEG MICROALBUMINURIA RESULT DOCUMENTED/REVIEW: ICD-10-PCS | Mod: CPTII,S$GLB,, | Performed by: STUDENT IN AN ORGANIZED HEALTH CARE EDUCATION/TRAINING PROGRAM

## 2023-11-21 PROCEDURE — 99999 PR PBB SHADOW E&M-EST. PATIENT-LVL III: CPT | Mod: PBBFAC,,, | Performed by: STUDENT IN AN ORGANIZED HEALTH CARE EDUCATION/TRAINING PROGRAM

## 2023-11-21 PROCEDURE — 3288F PR FALLS RISK ASSESSMENT DOCUMENTED: ICD-10-PCS | Mod: CPTII,S$GLB,, | Performed by: STUDENT IN AN ORGANIZED HEALTH CARE EDUCATION/TRAINING PROGRAM

## 2023-11-21 PROCEDURE — 3288F FALL RISK ASSESSMENT DOCD: CPT | Mod: CPTII,S$GLB,, | Performed by: STUDENT IN AN ORGANIZED HEALTH CARE EDUCATION/TRAINING PROGRAM

## 2023-11-21 PROCEDURE — 4010F ACE/ARB THERAPY RXD/TAKEN: CPT | Mod: CPTII,S$GLB,, | Performed by: STUDENT IN AN ORGANIZED HEALTH CARE EDUCATION/TRAINING PROGRAM

## 2023-11-21 PROCEDURE — 1160F PR REVIEW ALL MEDS BY PRESCRIBER/CLIN PHARMACIST DOCUMENTED: ICD-10-PCS | Mod: CPTII,S$GLB,, | Performed by: STUDENT IN AN ORGANIZED HEALTH CARE EDUCATION/TRAINING PROGRAM

## 2023-11-21 PROCEDURE — 4010F PR ACE/ARB THEARPY RXD/TAKEN: ICD-10-PCS | Mod: CPTII,S$GLB,, | Performed by: STUDENT IN AN ORGANIZED HEALTH CARE EDUCATION/TRAINING PROGRAM

## 2023-11-21 PROCEDURE — 1160F RVW MEDS BY RX/DR IN RCRD: CPT | Mod: CPTII,S$GLB,, | Performed by: STUDENT IN AN ORGANIZED HEALTH CARE EDUCATION/TRAINING PROGRAM

## 2023-11-21 PROCEDURE — 1100F PTFALLS ASSESS-DOCD GE2>/YR: CPT | Mod: CPTII,S$GLB,, | Performed by: STUDENT IN AN ORGANIZED HEALTH CARE EDUCATION/TRAINING PROGRAM

## 2023-11-21 PROCEDURE — 99214 PR OFFICE/OUTPT VISIT, EST, LEVL IV, 30-39 MIN: ICD-10-PCS | Mod: S$GLB,,, | Performed by: STUDENT IN AN ORGANIZED HEALTH CARE EDUCATION/TRAINING PROGRAM

## 2023-11-21 PROCEDURE — 99214 OFFICE O/P EST MOD 30 MIN: CPT | Mod: S$GLB,,, | Performed by: STUDENT IN AN ORGANIZED HEALTH CARE EDUCATION/TRAINING PROGRAM

## 2023-11-21 PROCEDURE — 3066F NEPHROPATHY DOC TX: CPT | Mod: CPTII,S$GLB,, | Performed by: STUDENT IN AN ORGANIZED HEALTH CARE EDUCATION/TRAINING PROGRAM

## 2023-11-21 PROCEDURE — 99999 PR PBB SHADOW E&M-EST. PATIENT-LVL III: ICD-10-PCS | Mod: PBBFAC,,, | Performed by: STUDENT IN AN ORGANIZED HEALTH CARE EDUCATION/TRAINING PROGRAM

## 2023-11-21 PROCEDURE — 1159F PR MEDICATION LIST DOCUMENTED IN MEDICAL RECORD: ICD-10-PCS | Mod: CPTII,S$GLB,, | Performed by: STUDENT IN AN ORGANIZED HEALTH CARE EDUCATION/TRAINING PROGRAM

## 2023-11-21 PROCEDURE — 1125F AMNT PAIN NOTED PAIN PRSNT: CPT | Mod: CPTII,S$GLB,, | Performed by: STUDENT IN AN ORGANIZED HEALTH CARE EDUCATION/TRAINING PROGRAM

## 2023-11-21 PROCEDURE — 1125F PR PAIN SEVERITY QUANTIFIED, PAIN PRESENT: ICD-10-PCS | Mod: CPTII,S$GLB,, | Performed by: STUDENT IN AN ORGANIZED HEALTH CARE EDUCATION/TRAINING PROGRAM

## 2023-11-21 RX ORDER — METHYLPREDNISOLONE 4 MG/1
TABLET ORAL
Status: CANCELLED | OUTPATIENT
Start: 2023-11-21

## 2023-11-21 RX ORDER — METHYLPREDNISOLONE 4 MG/1
TABLET ORAL
Qty: 21 EACH | Refills: 0 | Status: SHIPPED | OUTPATIENT
Start: 2023-11-21 | End: 2023-12-26

## 2023-11-21 NOTE — PROGRESS NOTES
Orthopaedic Follow-Up Visit    Last Appointment: 11/7/23  Diagnosis: Chronic left shoulder pain, suspected rotator cuff tear   Prior Procedure: MRI and Mobic    Cassandra Campos is a 73 y.o. female who is here for f/u evaluation of her left shoulder. The patient was last seen here by me on 11/7/23 at which point she continued to have left shoulder pain and symptoms despite treatments of rest, activity modification, Tylenol, corticosteroid injection and home exercise program, so we proceeded with MRI of the left shoulder for further evaluation. We also started her on Mobic to try and help her pain at night. The patient returns today to review her MRI and discuss further treatment options.  She reports continued pain in both shoulders, worse on the left.    To review her history, Cassandra Campos is a 73 y.o. right-hand dominant female who presented on 9/27/23 with left shoulder pain and dysfunction that had started 3-4 weeks prior due to a fall onto the left shoulder. Her symptoms included pain and lack of range of motion. Her pain was aggravated by overhead movement, reaching movements. She had tried rest, activity modification, Muscle relaxer, ibuprofen, heat and ice. Her symptoms and physical exam were most consistent with bilateral shoulder subacromial impingement and rotator cuff tendinopathy. She had tried rest, activity modification, Muscle relaxer, ibuprofen, heat and ice. We proceeded with corticosteroid injections into the subacromial space and home exercise program.    Patient's medications, allergies, past medical, surgical, social and family histories were reviewed and updated as appropriate.    Review of Systems   All systems reviewed were negative.  Specifically, the patient denies fever, chills, weight loss, chest pain, shortness of breath, or dyspnea on exertion.      Past Medical History:   Diagnosis Date    Arthritis     hands    Bilateral bunions     Borderline glaucoma     De Quervain's  disease (radial styloid tenosynovitis)     Gastritis     upper GI 2017    Hydradenitis     Hyperlipidemia     Hypertension     Insomnia     Migraines 2000    Nasal septum perforation     Obesity     Pneumonia     Restrictive airway disease     Sleep apnea     SVT (supraventricular tachycardia) 2013    Trigger finger     Type 2 diabetes mellitus      am 08/10/2021       Past Surgical History:   Procedure Laterality Date    AXILLARY HIDRADENITIS EXCISION Bilateral     BONE EXOSTOSIS EXCISION Right 2018    Procedure: EXCISION, EXOSTOSIS;  Surgeon: Jayro Pedraza Sr., MD;  Location: [facility] OR;  Service: Orthopedics;  Laterality: Right;    BREAST BIOPSY Bilateral     both benign    BREAST SURGERY  1998    CARPAL TUNNEL RELEASE      bilateral    CATARACT EXTRACTION Bilateral     OU     SECTION  1979    CHOLECYSTECTOMY  2014    COLONOSCOPY N/A 10/02/2020    Procedure: COLONOSCOPY;  Surgeon: Tushar Edwards MD;  Location: [facility];  Service: Endoscopy;  Laterality: N/A;    COLONOSCOPY W/ POLYPECTOMY  10/02/2020    Polyps x3, repeat 5 years; Tushar Edwards MD     CYST REMOVAL  2015    sebaceous cyst removed from face    DE QUERVAIN'S RELEASE Left 2020    Procedure: RELEASE, HAND, FOR DEQUERVAIN'S TENOSYNOVITIS;  Surgeon: Jaime Oswald MD;  Location: [facility] OR;  Service: Orthopedics;  Laterality: Left;    DE QUERVAIN'S RELEASE Right 2020    Procedure: RELEASE, HAND, FOR DEQUERVAIN'S TENOSYNOVITIS;  Surgeon: Jaime Oswald MD;  Location: [facility];  Service: Orthopedics;  Laterality: Right;    EYE SURGERY      gastric sleeve  2017    Dr. Watson    KNEE SURGERY Right     OLECRANON BURSECTOMY Right 2018    Procedure: BURSECTOMY, OLECRANON;  Surgeon: Jayro Pedraza Sr., MD;  Location: [facility];  Service: Orthopedics;  Laterality: Right;    SURGICAL REMOVAL OF BUNION WITH OSTEOTOMY OF METATARSAL BONE Left 05/10/2019    Procedure: BUNIONECTOMY, WITH  METATARSAL OSTEOTOMY;  Surgeon: Srinivasan Villanueva DPM;  Location: [facility] OR;  Service: Podiatry;  Laterality: Left;    SURGICAL REMOVAL OF BUNION WITH OSTEOTOMY OF METATARSAL BONE Right 06/28/2019    Procedure: BUNIONECTOMY, WITH METATARSAL OSTEOTOMY;  Surgeon: Srinivasan Villanueva DPM;  Location: [facility] OR;  Service: Podiatry;  Laterality: Right;    TONSILLECTOMY, ADENOIDECTOMY  1980s    TRIGGER FINGER RELEASE Right 04/01/2015    Dr. Pedraza       Patient's Medications   New Prescriptions    No medications on file   Previous Medications    ACCU-CHEK GUIDE TEST STRIPS STRP    USE TO TEST TWICE A DAY    ALBUTEROL (PROVENTIL/VENTOLIN HFA) 90 MCG/ACTUATION INHALER    INHALE 2 PUFFS INTO THE LUNGS EVERY 4 HOURS AS NEEDED FOR WHEEZING OR SHORTNESS OF BREATH.    AMITRIPTYLINE (ELAVIL) 150 MG TAB    Take 1 tablet (150 mg total) by mouth every evening.    BLOOD GLUCOSE CONTROL, HIGH (TRUE METRIX LEVEL 3) SOLN    1 each by Other route once daily.    BLOOD-GLUCOSE METER MISC    1 each by Misc.(Non-Drug; Combo Route) route 2 (two) times a day.    BLOOD-GLUCOSE SENSOR (DEXCOM G7 SENSOR) LUISA    1 Device by Misc.(Non-Drug; Combo Route) route continuous.    CHOLECALCIFEROL, VITAMIN D3, (VITAMIN D3) 25 MCG (1,000 UNIT) CAPSULE    Take 1,000 Units by mouth once daily.    ESZOPICLONE (LUNESTA) 3 MG TAB    Take 1 tablet (3 mg total) by mouth every evening.    LANCING DEVICE WITH LANCETS (ACCU-CHEK SOFT DEV LANCETS) KIT    1 each by Misc.(Non-Drug; Combo Route) route 2 (two) times a day.    LORAZEPAM (ATIVAN) 1 MG TABLET    TAKE 1 TABLET BY MOUTH TWICE A DAY    LOSARTAN (COZAAR) 25 MG TABLET    TAKE 1 TABLET BY MOUTH ONCE  DAILY    MELOXICAM (MOBIC) 15 MG TABLET    Take 1 tablet (15 mg total) by mouth once daily.    METOPROLOL SUCCINATE (TOPROL-XL) 50 MG 24 HR TABLET    Take 1 tablet (50 mg total) by mouth once daily.    OMEPRAZOLE (PRILOSEC) 20 MG CAPSULE    Take 1 capsule (20 mg total) by mouth once daily.    SEMAGLUTIDE (OZEMPIC) 2 MG/DOSE (8  MG/3 ML) PNIJ    Inject 2 mg into the skin every 7 days.    TRUEPLUS LANCETS 33 GAUGE MISC    TEST BLOOD SUGAR ONE TIME DAILY   Modified Medications    No medications on file   Discontinued Medications    No medications on file       Family History   Problem Relation Age of Onset    Prostate cancer Brother     Diabetes Maternal Aunt     Diabetes Cousin     Hypertension Maternal Grandmother        Review of patient's allergies indicates:   Allergen Reactions    Statins-hmg-coa reductase inhibitors Other (See Comments)     Elevated liver functions         Objective:      Physical Exam  Patient is alert and oriented, no distress. Skin is intact. Neuro is normal with no focal motor or sensory findings.    Limited cervical ROM. + spurling's test    Physical Exam:                       RIGHT                                     LEFT     Scap Dyskinesis/Winging       (-)                                             (-)     Tenderness:                                                                              Greater Tuberosity                  (-)                                            +  Bicipital Groove                       (-)                                             (-)  AC joint                                   (-)                                             (-)  Other:      ROM:  Forward Elevation       160                                          140  Abduction                    120                                          100  ER (at side)                 80                                            50     Strength:   Supraspinatus             5/5                                           4/5  Infraspinatus               5/5                                           4/5  Subscap / IR               5/5                                           5/5      Special Tests:              Neer:                                       (-)                                             +              Whalen:                                   +                                             +              SS Stress:                                +                                             +              Bear Hug:                                (-)                                             (-)              Grant's:                                 +                                             +              Resisted Thrower's:                +                                             +              Cross Arm Abduction:             (-)                                             (-)       Neurovascular examination  - Motor grossly intact bilaterally to shoulder abduction, elbow flexion and extension, wrist flexion and extension, and intrinsic hand musculature  - Sensation intact to light touch bilaterally in axillary, median, radial, and ulnar distributions  - Symmetrical radial pulses    Imaging:    XR Results:  Results for orders placed during the hospital encounter of 09/27/23    X-Ray Shoulder 2 or more views Bilat    Narrative  EXAM: XR SHOULDER COMPLETE 2 OR MORE VIEWS BILATERAL    CLINICAL HISTORY: [Pain]    FINDINGS: 8 views bilateral shoulders.  Mild AC joint degenerative changes.  No evidence of acute fracture or dislocation.  Normal mineralization.  Soft tissues are unremarkable.    Impression  No acute findings.    Finalized on: 9/27/2023 10:07 AM By:  Sergei Mcclure MD  [facility]# [id number]      2023-09-27 10:09:43.166    [facility]      MRI Results:  MRI Shoulder Without Contrast Left  Narrative: EXAM:  MRI SHOULDER WITHOUT CONTRAST LEFT    CLINICAL HISTORY: Left shoulder pain. Shoulder pain, rotator cuff disorder suspected, xray done; [M25.512]-Pain in left shoulder./[G89.29]-Other chronic pain.    TECHNIQUE: Standard multiplanar, multisequence MR imaging protocol of the left shoulder was performed.    FINDINGS:  ROTATOR CUFF: Moderate to severe supraspinatus, infraspinatus and subscapularis tendinosis.  Tiny low-grade partial  interstitial tear of the posterior supraspinatus tendon insertion at the footprint. Small high-grade partial articular surface tear of the subscapularis tendon insertion.  No other rotator cuff tear identified.    MUSCULATURE: Within normal limits.    BURSITIS: Mild subcoracoid bursitis.    LONG HEAD BICEPS TENDON: Intact and normal in position.    LABRUM and GLENOHUMERAL LIGAMENTS: No discernible tears.    MARROW: No fractures, marrow edema or suspicious bone lesions.    GLENOHUMERAL JOINT: No full-thickness chondral defects.  Normal alignment.  Small joint effusion.    AC JOINT: Mild  osteoarthritis with capsular hypertrophy and small marginal osteophytes.  Normal alignment.    ACROMIAL ARCH: Type 1 acromion with minor anterior downsloping.  No os acromiale.  Impression: 1.  Significant rotator cuff tendinosis.  Tiny low-grade interstitial tear supraspinatus tendon insertion.  Small high-grade partial articular surface tear subscapularis tendon insertion.  Mild subcoracoid bursitis.  2.  Mild acromioclavicular osteoarthritis.  Glenohumeral joint effusion.  Minimally downsloping acromion.    Finalized on: 11/20/2023 9:18 AM By:  Dmitri Helm MD  [facility]# [id number]      2023-11-20 09:20:55.740    [facility]          CT Results:  No results found for this or any previous visit.      Physician read: I agree with the above impression.    Assessment/Plan:   Cassandra Campos is a 73 y.o. female with chronic left shoulder rotator cuff tendinosis, right rotator cuff tendinosis, cervical radiculopathy.      Plan:    We discussed diagnosis and treatment options and reviewed MRI with the patient today. Her MRI shows left shoulder rotator cuff tendinosis without any significant tear.   Based on imaging findings, I do not recommend any operative treatment.  We went over options for continued nonoperative management.  Some of her symptoms seem to be related to her cervical spine and could be radicular in nature.  I recommend physical  therapy to improve blood flow and strength to her rotator cuff and shoulders. Patient deferred and opted for a home exercise program instead.   I also recommend short course of oral steroids for treatment of cervical radiculopathy.  At least 15 minutes were spent developing, teaching, and performing a home exercise program.  A written summary was provided and all questions were answered.  This service was performed under the direction of Francesco Palma MD.  CPT 62581-HY.  Follow up as needed          Francesco Palma MD    I, Carlos Farley, acted as a scribe for Francesco Palma MD for the duration of this office visit.

## 2023-11-22 RX ORDER — SEMAGLUTIDE 2.68 MG/ML
2 INJECTION, SOLUTION SUBCUTANEOUS
Qty: 3 ML | Refills: 2 | Status: SHIPPED | OUTPATIENT
Start: 2023-11-22 | End: 2024-02-13 | Stop reason: SDUPTHER

## 2023-11-22 NOTE — TELEPHONE ENCOUNTER
No care due was identified.  [facility] Embedded Care Due Messages. Reference number: [id number].   11/21/2023 6:03:54 PM CST

## 2023-11-28 ENCOUNTER — OFFICE VISIT (OUTPATIENT)
Dept: ORTHOPEDICS | Facility: CLINIC | Age: 73
End: 2023-11-28
Payer: MEDICARE

## 2023-11-28 VITALS — BODY MASS INDEX: 34.02 KG/M2 | WEIGHT: 192 LBS | HEIGHT: 63 IN

## 2023-11-28 DIAGNOSIS — M65.30 TRIGGER FINGER, ACQUIRED: Primary | ICD-10-CM

## 2023-11-28 PROCEDURE — 20550 TENDON SHEATH: ICD-10-PCS | Mod: 50,S$GLB,, | Performed by: ORTHOPAEDIC SURGERY

## 2023-11-28 PROCEDURE — 1125F AMNT PAIN NOTED PAIN PRSNT: CPT | Mod: CPTII,S$GLB,, | Performed by: ORTHOPAEDIC SURGERY

## 2023-11-28 PROCEDURE — 99213 OFFICE O/P EST LOW 20 MIN: CPT | Mod: 25,S$GLB,, | Performed by: ORTHOPAEDIC SURGERY

## 2023-11-28 PROCEDURE — 3066F NEPHROPATHY DOC TX: CPT | Mod: CPTII,S$GLB,, | Performed by: ORTHOPAEDIC SURGERY

## 2023-11-28 PROCEDURE — 1159F PR MEDICATION LIST DOCUMENTED IN MEDICAL RECORD: ICD-10-PCS | Mod: CPTII,S$GLB,, | Performed by: ORTHOPAEDIC SURGERY

## 2023-11-28 PROCEDURE — 1101F PR PT FALLS ASSESS DOC 0-1 FALLS W/OUT INJ PAST YR: ICD-10-PCS | Mod: CPTII,S$GLB,, | Performed by: ORTHOPAEDIC SURGERY

## 2023-11-28 PROCEDURE — 1159F MED LIST DOCD IN RCRD: CPT | Mod: CPTII,S$GLB,, | Performed by: ORTHOPAEDIC SURGERY

## 2023-11-28 PROCEDURE — 1101F PT FALLS ASSESS-DOCD LE1/YR: CPT | Mod: CPTII,S$GLB,, | Performed by: ORTHOPAEDIC SURGERY

## 2023-11-28 PROCEDURE — 3288F PR FALLS RISK ASSESSMENT DOCUMENTED: ICD-10-PCS | Mod: CPTII,S$GLB,, | Performed by: ORTHOPAEDIC SURGERY

## 2023-11-28 PROCEDURE — 4010F ACE/ARB THERAPY RXD/TAKEN: CPT | Mod: CPTII,S$GLB,, | Performed by: ORTHOPAEDIC SURGERY

## 2023-11-28 PROCEDURE — 3061F NEG MICROALBUMINURIA REV: CPT | Mod: CPTII,S$GLB,, | Performed by: ORTHOPAEDIC SURGERY

## 2023-11-28 PROCEDURE — 4010F PR ACE/ARB THEARPY RXD/TAKEN: ICD-10-PCS | Mod: CPTII,S$GLB,, | Performed by: ORTHOPAEDIC SURGERY

## 2023-11-28 PROCEDURE — 99999 PR PBB SHADOW E&M-EST. PATIENT-LVL III: CPT | Mod: PBBFAC,,, | Performed by: ORTHOPAEDIC SURGERY

## 2023-11-28 PROCEDURE — 99999 PR PBB SHADOW E&M-EST. PATIENT-LVL III: ICD-10-PCS | Mod: PBBFAC,,, | Performed by: ORTHOPAEDIC SURGERY

## 2023-11-28 PROCEDURE — 3044F HG A1C LEVEL LT 7.0%: CPT | Mod: CPTII,S$GLB,, | Performed by: ORTHOPAEDIC SURGERY

## 2023-11-28 PROCEDURE — 3008F BODY MASS INDEX DOCD: CPT | Mod: CPTII,S$GLB,, | Performed by: ORTHOPAEDIC SURGERY

## 2023-11-28 PROCEDURE — 99213 PR OFFICE/OUTPT VISIT, EST, LEVL III, 20-29 MIN: ICD-10-PCS | Mod: 25,S$GLB,, | Performed by: ORTHOPAEDIC SURGERY

## 2023-11-28 PROCEDURE — 20550 NJX 1 TENDON SHEATH/LIGAMENT: CPT | Mod: 50,S$GLB,, | Performed by: ORTHOPAEDIC SURGERY

## 2023-11-28 PROCEDURE — 3066F PR DOCUMENTATION OF TREATMENT FOR NEPHROPATHY: ICD-10-PCS | Mod: CPTII,S$GLB,, | Performed by: ORTHOPAEDIC SURGERY

## 2023-11-28 PROCEDURE — 3044F PR MOST RECENT HEMOGLOBIN A1C LEVEL <7.0%: ICD-10-PCS | Mod: CPTII,S$GLB,, | Performed by: ORTHOPAEDIC SURGERY

## 2023-11-28 PROCEDURE — 3288F FALL RISK ASSESSMENT DOCD: CPT | Mod: CPTII,S$GLB,, | Performed by: ORTHOPAEDIC SURGERY

## 2023-11-28 PROCEDURE — 1160F PR REVIEW ALL MEDS BY PRESCRIBER/CLIN PHARMACIST DOCUMENTED: ICD-10-PCS | Mod: CPTII,S$GLB,, | Performed by: ORTHOPAEDIC SURGERY

## 2023-11-28 PROCEDURE — 1160F RVW MEDS BY RX/DR IN RCRD: CPT | Mod: CPTII,S$GLB,, | Performed by: ORTHOPAEDIC SURGERY

## 2023-11-28 PROCEDURE — 1125F PR PAIN SEVERITY QUANTIFIED, PAIN PRESENT: ICD-10-PCS | Mod: CPTII,S$GLB,, | Performed by: ORTHOPAEDIC SURGERY

## 2023-11-28 PROCEDURE — 3061F PR NEG MICROALBUMINURIA RESULT DOCUMENTED/REVIEW: ICD-10-PCS | Mod: CPTII,S$GLB,, | Performed by: ORTHOPAEDIC SURGERY

## 2023-11-28 PROCEDURE — 3008F PR BODY MASS INDEX (BMI) DOCUMENTED: ICD-10-PCS | Mod: CPTII,S$GLB,, | Performed by: ORTHOPAEDIC SURGERY

## 2023-11-28 RX ORDER — TRIAMCINOLONE ACETONIDE 40 MG/ML
40 INJECTION, SUSPENSION INTRA-ARTICULAR; INTRAMUSCULAR
Status: DISCONTINUED | OUTPATIENT
Start: 2023-11-28 | End: 2023-11-28 | Stop reason: HOSPADM

## 2023-11-28 RX ADMIN — TRIAMCINOLONE ACETONIDE 40 MG: 40 INJECTION, SUSPENSION INTRA-ARTICULAR; INTRAMUSCULAR at 09:11

## 2023-11-28 NOTE — PROGRESS NOTES
Subjective:     Patient ID: Cassandra Campos is a 73 y.o. female.    Chief Complaint: Pain of the Left Hand and Pain of the Right Hand      HPI:  The patient is a 73-year-old female with bilateral trigger thumb and left ring trigger finger.  She requests injection today    Past Medical History:   Diagnosis Date    Arthritis     hands    Bilateral bunions     Borderline glaucoma     De Quervain's disease (radial styloid tenosynovitis)     Gastritis     upper GI 2017    Hydradenitis     Hyperlipidemia     Hypertension     Insomnia     Migraines 2000    Nasal septum perforation     Obesity     Pneumonia     Restrictive airway disease     Sleep apnea     SVT (supraventricular tachycardia) 2013    Trigger finger     Type 2 diabetes mellitus      am 08/10/2021     Past Surgical History:   Procedure Laterality Date    AXILLARY HIDRADENITIS EXCISION Bilateral     BONE EXOSTOSIS EXCISION Right 2018    Procedure: EXCISION, EXOSTOSIS;  Surgeon: Jayro Pedraza Sr., MD;  Location: [facility];  Service: Orthopedics;  Laterality: Right;    BREAST BIOPSY Bilateral     both benign    BREAST SURGERY  1998    CARPAL TUNNEL RELEASE      bilateral    CATARACT EXTRACTION Bilateral     OU     SECTION  1979    CHOLECYSTECTOMY  2014    COLONOSCOPY N/A 10/02/2020    Procedure: COLONOSCOPY;  Surgeon: Tushar Edwards MD;  Location: [facility];  Service: Endoscopy;  Laterality: N/A;    COLONOSCOPY W/ POLYPECTOMY  10/02/2020    Polyps x3, repeat 5 years; Tushar Edwards MD     CYST REMOVAL  2015    sebaceous cyst removed from face    DE QUERVAIN'S RELEASE Left 2020    Procedure: RELEASE, HAND, FOR DEQUERVAIN'S TENOSYNOVITIS;  Surgeon: Jaime Oswald MD;  Location[facility] OR;  Service: Orthopedics;  Laterality: Left;    DE QUERVAIN'S RELEASE Right 2020    Procedure: RELEASE, HAND, FOR DEQUERVAIN'S TENOSYNOVITIS;  Surgeon: Jaime Oswald MD;  Location: [facility] OR;  Service:  Orthopedics;  Laterality: Right;    EYE SURGERY      gastric sleeve  2017    Dr. Watson    KNEE SURGERY Right     OLECRANON BURSECTOMY Right 2018    Procedure: BURSECTOMY, OLECRANON;  Surgeon: Jayro Pedraza Sr., MD;  Location: [facility] OR;  Service: Orthopedics;  Laterality: Right;    SURGICAL REMOVAL OF BUNION WITH OSTEOTOMY OF METATARSAL BONE Left 05/10/2019    Procedure: BUNIONECTOMY, WITH METATARSAL OSTEOTOMY;  Surgeon: Srinivasan Villanueva DPM;  Location: [facility] OR;  Service: Podiatry;  Laterality: Left;    SURGICAL REMOVAL OF BUNION WITH OSTEOTOMY OF METATARSAL BONE Right 2019    Procedure: BUNIONECTOMY, WITH METATARSAL OSTEOTOMY;  Surgeon: Srinivasan Villanueva DPM;  Location: [facility] OR;  Service: Podiatry;  Laterality: Right;    TONSILLECTOMY, ADENOIDECTOMY  1980s    TRIGGER FINGER RELEASE Right 2015    Dr. Pedraza     Family History   Problem Relation Age of Onset    Prostate cancer Brother     Diabetes Maternal Aunt     Diabetes Cousin     Hypertension Maternal Grandmother      Social History     Socioeconomic History    Marital status:     Number of children: 1   Occupational History    Occupation:  aid   Tobacco Use    Smoking status: Former     Current packs/day: 0.00     Average packs/day: 0.5 packs/day for 42.0 years (21.0 ttl pk-yrs)     Types: Cigarettes     Start date: 1970     Quit date: 2012     Years since quittin.9    Smokeless tobacco: Never   Substance and Sexual Activity    Alcohol use: Yes     Alcohol/week: 1.0 standard drink of alcohol     Types: 1 Glasses of wine per week     Comment: Glass red wine once a every 2 weeks    Drug use: Never    Sexual activity: Not Currently     Partners: Male     Birth control/protection: Abstinence, None   Social History Narrative    Single, part-time teacher. Masters degree biology.      Social Determinants of Health     Financial Resource Strain: Low Risk  (2023)    Overall Financial Resource Strain (CARDIA)      Difficulty of Paying Living Expenses: Not hard at all   Food Insecurity: Food Insecurity Present (7/19/2023)    Hunger Vital Sign     Worried About Running Out of Food in the Last Year: Sometimes true     Ran Out of Food in the Last Year: Sometimes true   Transportation Needs: No Transportation Needs (7/19/2023)    PRAPARE - Transportation     Lack of Transportation (Medical): No     Lack of Transportation (Non-Medical): No   Physical Activity: Insufficiently Active (7/19/2023)    Exercise Vital Sign     Days of Exercise per Week: 3 days     Minutes of Exercise per Session: 30 min   Stress: Stress Concern Present (7/19/2023)    [employer] [place] [employer] - Occupational Stress Questionnaire     Feeling of Stress : To some extent   Social Connections: Unknown (7/19/2023)    Social Connection and Isolation Panel [NHANES]     Frequency of Communication with Friends and Family: Twice a week     Frequency of Social Gatherings with Friends and Family: Three times a week     Active Member of Clubs or Organizations: Yes     Attends Club or Organization Meetings: 1 to 4 times per year     Marital Status:    Housing Stability: Low Risk  (7/19/2023)    Housing Stability Vital Sign     Unable to Pay for Housing in the Last Year: No     Number of Places Lived in the Last Year: 1     Unstable Housing in the Last Year: No     Medication List with Changes/Refills   Current Medications    ACCU-CHEK GUIDE TEST STRIPS STRP    USE TO TEST TWICE A DAY    ALBUTEROL (PROVENTIL/VENTOLIN HFA) 90 MCG/ACTUATION INHALER    INHALE 2 PUFFS INTO THE LUNGS EVERY 4 HOURS AS NEEDED FOR WHEEZING OR SHORTNESS OF BREATH.    AMITRIPTYLINE (ELAVIL) 150 MG TAB    Take 1 tablet (150 mg total) by mouth every evening.    BLOOD GLUCOSE CONTROL, HIGH (TRUE METRIX LEVEL 3) SOLN    1 each by Other route once daily.    BLOOD-GLUCOSE METER MISC    1 each by Misc.(Non-Drug; Combo Route) route 2 (two) times a day.    BLOOD-GLUCOSE SENSOR  (DEXCOM G7 SENSOR) LUISA    1 Device by Misc.(Non-Drug; Combo Route) route continuous.    CHOLECALCIFEROL, VITAMIN D3, (VITAMIN D3) 25 MCG (1,000 UNIT) CAPSULE    Take 1,000 Units by mouth once daily.    ESZOPICLONE (LUNESTA) 3 MG TAB    Take 1 tablet (3 mg total) by mouth every evening.    LANCING DEVICE WITH LANCETS (ACCU-CHEK SOFT DEV LANCETS) KIT    1 each by Misc.(Non-Drug; Combo Route) route 2 (two) times a day.    LORAZEPAM (ATIVAN) 1 MG TABLET    TAKE 1 TABLET BY MOUTH TWICE A DAY    LOSARTAN (COZAAR) 25 MG TABLET    TAKE 1 TABLET BY MOUTH ONCE  DAILY    MELOXICAM (MOBIC) 15 MG TABLET    Take 1 tablet (15 mg total) by mouth once daily.    METHYLPREDNISOLONE (MEDROL DOSEPACK) 4 MG TABLET    use as directed    METOPROLOL SUCCINATE (TOPROL-XL) 50 MG 24 HR TABLET    Take 1 tablet (50 mg total) by mouth once daily.    OMEPRAZOLE (PRILOSEC) 20 MG CAPSULE    Take 1 capsule (20 mg total) by mouth once daily.    SEMAGLUTIDE (OZEMPIC) 2 MG/DOSE (8 MG/3 ML) PNIJ    Inject 2 mg into the skin every 7 days.    TRUEPLUS LANCETS 33 GAUGE MISC    TEST BLOOD SUGAR ONE TIME DAILY     Review of patient's allergies indicates:   Allergen Reactions    Statins-hmg-coa reductase inhibitors Other (See Comments)     Elevated liver functions     Review of Systems   Constitutional: Negative for malaise/fatigue.   HENT:  Negative for hearing loss.    Eyes:  Negative for double vision and visual disturbance.   Cardiovascular:  Positive for irregular heartbeat. Negative for chest pain.   Respiratory:  Positive for wheezing. Negative for shortness of breath.    Endocrine: Negative for cold intolerance.   Hematologic/Lymphatic: Does not bruise/bleed easily.   Skin:  Negative for poor wound healing and suspicious lesions.   Musculoskeletal:  Positive for arthritis, joint pain and joint swelling. Negative for gout.   Gastrointestinal:  Positive for heartburn. Negative for nausea and vomiting.   Genitourinary:  Negative for dysuria.    Neurological:  Negative for numbness, paresthesias and sensory change.   Psychiatric/Behavioral:  Positive for altered mental status and substance abuse. Negative for depression and memory loss. The patient has insomnia and is nervous/anxious.    Allergic/Immunologic: Negative for persistent infections.       Objective:   Body mass index is 34.01 kg/m².  There were no vitals filed for this visit.             General    Constitutional: She is oriented to person, place, and time. She appears well-developed and well-nourished. No distress.   HENT:   Head: Normocephalic.   Eyes: EOM are normal.   Pulmonary/Chest: Effort normal.   Neurological: She is oriented to person, place, and time.   Psychiatric: She has a normal mood and affect.             Right Hand/Wrist Exam     Inspection   Scars: Wrist - present Hand -  present  Effusion: Wrist - absent Hand -  absent    Pain   Hand - The patient exhibits pain of the thumb MCP.    Other     Neuorologic Exam    Median Distribution: normal  Ulnar Distribution: normal  Radial Distribution: normal    Comments:  The patient has a right carpal tunnel scar and de Quervain scar.  She has active triggering of the right thumb with a palpable nodule that moves with tendon excursion.      Left Hand/Wrist Exam     Inspection   Scars: Wrist - present Hand -  present  Effusion: Wrist - absent Hand -  absent    Pain   Hand - The patient exhibits pain of the thumb MCP and ring MCP.    Other     Sensory Exam  Median Distribution: normal  Ulnar Distribution: normal  Radial Distribution: normal    Comments:  The patient has left carpal tunnel scar and de Quervain scar.  She has active triggering of the left thumb and left ring finger with palpable nodules that move with tendon excursion.          Vascular Exam       Capillary Refill  Right Hand: normal capillary refill  Left Hand: normal capillary refill         radiographs were not obtained today  Assessment:     Encounter Diagnosis   Name  Primary?    Trigger finger, acquired Yes    Bilateral thumb and left ring finger    Plan:       The patient injected bilateral thumb trigger thumbs and left ring trigger finger each with 0.5 cc of Kenalog and 0.5 cc of 2% plain lidocaine.  She will wait at least 3 months between injection.              Disclaimer: This note was prepared using a voice recognition system and is likely to have sound alike errors within the text.

## 2023-11-28 NOTE — PROCEDURES
Tendon Sheath    Date/Time: 11/28/2023 9:15 AM    Performed by: Jaime Oswald MD  Authorized by: Jaime Oswald MD    Consent Done?:  Yes (Verbal)  Indications:  Pain  Timeout: prior to procedure the correct patient, procedure, and site was verified    Prep: patient was prepped and draped in usual sterile fashion      Local anesthesia used?: Yes    Local anesthetic:  Lidocaine 2% without epinephrine  Anesthetic total (ml):  0.5    Location:  Thumb  Site:  R thumb flexor tendon sheath and L thumb flexor tendon sheath  Ultrasonic guidance for needle placement?: No    Needle size:  25 G  Approach:  Volar  Medications:  40 mg triamcinolone acetonide 40 mg/mL; 40 mg triamcinolone acetonide 40 mg/mL

## 2023-11-28 NOTE — PROCEDURES
Tendon Sheath    Date/Time: 11/28/2023 9:15 AM    Performed by: Jaime Oswald MD  Authorized by: Jaime Oswald MD    Consent Done?:  Yes (Verbal)  Indications:  Pain  Timeout: prior to procedure the correct patient, procedure, and site was verified    Prep: patient was prepped and draped in usual sterile fashion      Local anesthesia used?: Yes    Local anesthetic:  Lidocaine 2% without epinephrine  Anesthetic total (ml):  0.5    Location:  Ring finger  Site:  L ring flexor tendon sheath  Ultrasonic guidance for needle placement?: No    Needle size:  25 G  Approach:  Volar  Medications:  40 mg triamcinolone acetonide 40 mg/mL

## 2023-11-28 NOTE — PROGRESS NOTES
Detail Level: Detailed PODIATRY NOTE    CHIEF COMPLAINT  Chief Complaint   Patient presents with    Diabetic Foot Exam     PCP Dr. Maldonado 01/17/19, DFE         HPI    SUBJECTIVE: Cassandra Campos is a 68 y.o. female w/ PMH of DM2,  and other medical problems who presents to clinic for follow up fungal toenails and routine foot exam. She has no further pedal complaints.    HgA1c:   Hemoglobin A1C   Date Value Ref Range Status   02/19/2019 6.1 (H) 4.0 - 5.6 % Final     Comment:     ADA Screening Guidelines:  5.7-6.4%  Consistent with prediabetes  >or=6.5%  Consistent with diabetes  High levels of fetal hemoglobin interfere with the HbA1C  assay. Heterozygous hemoglobin variants (HbS, HgC, etc)do  not significantly interfere with this assay.   However, presence of multiple variants may affect accuracy.     08/14/2018 5.7 (H) 4.0 - 5.6 % Final     Comment:     ADA Screening Guidelines:  5.7-6.4%  Consistent with prediabetes  >or=6.5%  Consistent with diabetes  High levels of fetal hemoglobin interfere with the HbA1C  assay. Heterozygous hemoglobin variants (HbS, HgC, etc)do  not significantly interfere with this assay.   However, presence of multiple variants may affect accuracy.     02/12/2018 5.3 4.0 - 5.6 % Final     Comment:     According to ADA guidelines, hemoglobin A1c <7.0% represents  optimal control in non-pregnant diabetic patients. Different  metrics may apply to specific patient populations.   Standards of Medical Care in Diabetes-2016.  For the purpose of screening for the presence of diabetes:  <5.7%     Consistent with the absence of diabetes  5.7-6.4%  Consistent with increasing risk for diabetes   (prediabetes)  >or=6.5%  Consistent with diabetes  Currently, no consensus exists for use of hemoglobin A1c  for diagnosis of diabetes for children.  This Hemoglobin A1c assay has significant interference with fetal   hemoglobin   (HbF). The results are invalid for patients with abnormal amounts of   HbF,   including those with  "known Hereditary Persistence   of Fetal Hemoglobin. Heterozygous hemoglobin variants (HbAS, HbAC,   HbAD, HbAE, HbA2) do not significantly interfere with this assay;   however, presence of multiple variants in a sample may impact the %   interference.         REVIEW OF SYSTEMS  General: Denies any fever or chills  Chest: Denies shortness of breath, wheezing, coughing, or sputum production  Heart: Denies chest pain.  As noted above and per history of current illness above, otherwise negative in the remainder of the 14 systems.     PHYSICAL EXAM  Vitals:    02/19/19 1109   BP: 131/71   Pulse: 103   Weight: 92.4 kg (203 lb 11.3 oz)   Height: 5' 3" (1.6 m)       GEN:  This patient is well-developed, well-nourished and appears stated age, well-oriented to person, place and time, and cooperative and pleasant on today's visit.      LOWER EXTREMITY  Vascular:   · DP pedal pulse 2/4 b/l, PT pedal pulse 2/4 b/l  · Skin temperature warm to warm from prox to distally  · CFT <5 secs b/l  · There is no  edema noted b/l.     Dermatologic:   · Thickened, dystrophic, elongated toenails with subungal debris 1-5 b/l.   · No open skin lesions noted  · No erythema or drainage noted b/l.  · Webspaces are C/D/I B/L.  · There is no hyperkeratotic tissue noted.  · Skin texture and turgor dry/shiny  · There is no pedal hair growth noted    Neurologic:  · Vibratory sensation diminished at level of Hallux IPJ b/l    · Protective sensation absent at 0/10 sites upon examination with Dana Weinsten 5.07 g monofilament.   · Propioception intact at 1st MTPJ b/l.   · Achilles and patellar deep tendon reflexes intact  · Babinski reflex absent b/l. Light touch and sharp/dull sensation intact b/l.    Musculoskeletal/Orthopedic:  · No symptomatic structural abnormalities noted.   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 Muscle tone is normal.  · Pain " Add 48841 Cpt? (Important Note: In 2017 The Use Of 01699 Is Being Tracked By Cms To Determine Future Global Period Reimbursement For Global Periods): no Wound Evaluated By (Optional): Sage Rela MD free range of motion in all four quadrants without stiffness and limitation b/l      ASSESSMENT  Dermatophytosis of nail    Bilateral bunions      Plan:  -Discuss presenting problems, etiology, pathologic processes and management options with patient today.   -continue with topical antifungal     Currently bunion deformity is asymptomatic and patient was guided on accommodating bunions appropriately with wider toe box shoes. Patient was also guided on over-the-counter anti-inflammatories and offloading cushion padding for any acute flareups. If conservative treatment fails, then due to osseous deformity we will discuss surgical intervention.            Future Appointments   Date Time Provider Department Center   2/26/2019  3:30 PM Denia Maldonado [place] [facility] [place] [facility] [place]   7/10/2019  1:45 PM [facility] XR2 [facility] [place]   7/10/2019  2:00 PM PULMONARY LAB, [facility] [place]   7/10/2019  2:20 PM [facility]            Wound Diameter In Cm(Optional): 0 Wound Crusting?: clean Wound Color?: pink Patient To Follow-Up With?: our clinic Follow Up Units (Optional): 6 Follow Up Time Frame (Optional): months

## 2023-12-04 DIAGNOSIS — I15.9 SECONDARY HYPERTENSION: ICD-10-CM

## 2023-12-05 RX ORDER — OMEPRAZOLE 20 MG/1
20 CAPSULE, DELAYED RELEASE ORAL
Qty: 90 CAPSULE | Refills: 2 | Status: SHIPPED | OUTPATIENT
Start: 2023-12-05

## 2023-12-05 RX ORDER — METOPROLOL SUCCINATE 50 MG/1
50 TABLET, EXTENDED RELEASE ORAL
Qty: 90 TABLET | Refills: 2 | Status: SHIPPED | OUTPATIENT
Start: 2023-12-05

## 2023-12-05 NOTE — TELEPHONE ENCOUNTER
Refill Decision Note   Cassandra Campos  is requesting a refill authorization.  Brief Assessment and Rationale for Refill:  Approve     Medication Therapy Plan:         Comments:     Note composed:12:23 PM 12/05/2023

## 2023-12-05 NOTE — TELEPHONE ENCOUNTER
No care due was identified.  Health [facility] Embedded Care Due Messages. Reference number: [id number].   12/04/2023 9:28:30 PM CST

## 2023-12-11 ENCOUNTER — PATIENT MESSAGE (OUTPATIENT)
Dept: ORTHOPEDICS | Facility: CLINIC | Age: 73
End: 2023-12-11
Payer: MEDICARE

## 2023-12-13 ENCOUNTER — OFFICE VISIT (OUTPATIENT)
Dept: ORTHOPEDICS | Facility: CLINIC | Age: 73
End: 2023-12-13
Payer: MEDICARE

## 2023-12-13 VITALS — BODY MASS INDEX: 34.02 KG/M2 | WEIGHT: 192 LBS | HEIGHT: 63 IN

## 2023-12-13 DIAGNOSIS — M65.30 TRIGGER FINGER, ACQUIRED: Primary | ICD-10-CM

## 2023-12-13 PROCEDURE — 99213 PR OFFICE/OUTPT VISIT, EST, LEVL III, 20-29 MIN: ICD-10-PCS | Mod: 25,S$GLB,, | Performed by: ORTHOPAEDIC SURGERY

## 2023-12-13 PROCEDURE — 3044F HG A1C LEVEL LT 7.0%: CPT | Mod: CPTII,S$GLB,, | Performed by: ORTHOPAEDIC SURGERY

## 2023-12-13 PROCEDURE — 1101F PT FALLS ASSESS-DOCD LE1/YR: CPT | Mod: CPTII,S$GLB,, | Performed by: ORTHOPAEDIC SURGERY

## 2023-12-13 PROCEDURE — 1101F PR PT FALLS ASSESS DOC 0-1 FALLS W/OUT INJ PAST YR: ICD-10-PCS | Mod: CPTII,S$GLB,, | Performed by: ORTHOPAEDIC SURGERY

## 2023-12-13 PROCEDURE — 3066F PR DOCUMENTATION OF TREATMENT FOR NEPHROPATHY: ICD-10-PCS | Mod: CPTII,S$GLB,, | Performed by: ORTHOPAEDIC SURGERY

## 2023-12-13 PROCEDURE — 4010F ACE/ARB THERAPY RXD/TAKEN: CPT | Mod: CPTII,S$GLB,, | Performed by: ORTHOPAEDIC SURGERY

## 2023-12-13 PROCEDURE — 1125F PR PAIN SEVERITY QUANTIFIED, PAIN PRESENT: ICD-10-PCS | Mod: CPTII,S$GLB,, | Performed by: ORTHOPAEDIC SURGERY

## 2023-12-13 PROCEDURE — 3008F PR BODY MASS INDEX (BMI) DOCUMENTED: ICD-10-PCS | Mod: CPTII,S$GLB,, | Performed by: ORTHOPAEDIC SURGERY

## 2023-12-13 PROCEDURE — 3044F PR MOST RECENT HEMOGLOBIN A1C LEVEL <7.0%: ICD-10-PCS | Mod: CPTII,S$GLB,, | Performed by: ORTHOPAEDIC SURGERY

## 2023-12-13 PROCEDURE — 3066F NEPHROPATHY DOC TX: CPT | Mod: CPTII,S$GLB,, | Performed by: ORTHOPAEDIC SURGERY

## 2023-12-13 PROCEDURE — 1159F MED LIST DOCD IN RCRD: CPT | Mod: CPTII,S$GLB,, | Performed by: ORTHOPAEDIC SURGERY

## 2023-12-13 PROCEDURE — 1159F PR MEDICATION LIST DOCUMENTED IN MEDICAL RECORD: ICD-10-PCS | Mod: CPTII,S$GLB,, | Performed by: ORTHOPAEDIC SURGERY

## 2023-12-13 PROCEDURE — 3288F PR FALLS RISK ASSESSMENT DOCUMENTED: ICD-10-PCS | Mod: CPTII,S$GLB,, | Performed by: ORTHOPAEDIC SURGERY

## 2023-12-13 PROCEDURE — 3288F FALL RISK ASSESSMENT DOCD: CPT | Mod: CPTII,S$GLB,, | Performed by: ORTHOPAEDIC SURGERY

## 2023-12-13 PROCEDURE — 1160F PR REVIEW ALL MEDS BY PRESCRIBER/CLIN PHARMACIST DOCUMENTED: ICD-10-PCS | Mod: CPTII,S$GLB,, | Performed by: ORTHOPAEDIC SURGERY

## 2023-12-13 PROCEDURE — 4010F PR ACE/ARB THEARPY RXD/TAKEN: ICD-10-PCS | Mod: CPTII,S$GLB,, | Performed by: ORTHOPAEDIC SURGERY

## 2023-12-13 PROCEDURE — 1125F AMNT PAIN NOTED PAIN PRSNT: CPT | Mod: CPTII,S$GLB,, | Performed by: ORTHOPAEDIC SURGERY

## 2023-12-13 PROCEDURE — 3061F PR NEG MICROALBUMINURIA RESULT DOCUMENTED/REVIEW: ICD-10-PCS | Mod: CPTII,S$GLB,, | Performed by: ORTHOPAEDIC SURGERY

## 2023-12-13 PROCEDURE — 20550 TENDON SHEATH: ICD-10-PCS | Mod: 50,S$GLB,, | Performed by: ORTHOPAEDIC SURGERY

## 2023-12-13 PROCEDURE — 99213 OFFICE O/P EST LOW 20 MIN: CPT | Mod: 25,S$GLB,, | Performed by: ORTHOPAEDIC SURGERY

## 2023-12-13 PROCEDURE — 1160F RVW MEDS BY RX/DR IN RCRD: CPT | Mod: CPTII,S$GLB,, | Performed by: ORTHOPAEDIC SURGERY

## 2023-12-13 PROCEDURE — 3008F BODY MASS INDEX DOCD: CPT | Mod: CPTII,S$GLB,, | Performed by: ORTHOPAEDIC SURGERY

## 2023-12-13 PROCEDURE — 20550 NJX 1 TENDON SHEATH/LIGAMENT: CPT | Mod: 50,S$GLB,, | Performed by: ORTHOPAEDIC SURGERY

## 2023-12-13 PROCEDURE — 3061F NEG MICROALBUMINURIA REV: CPT | Mod: CPTII,S$GLB,, | Performed by: ORTHOPAEDIC SURGERY

## 2023-12-13 PROCEDURE — 99999 PR PBB SHADOW E&M-EST. PATIENT-LVL III: ICD-10-PCS | Mod: PBBFAC,,, | Performed by: ORTHOPAEDIC SURGERY

## 2023-12-13 PROCEDURE — 99999 PR PBB SHADOW E&M-EST. PATIENT-LVL III: CPT | Mod: PBBFAC,,, | Performed by: ORTHOPAEDIC SURGERY

## 2023-12-13 RX ORDER — TRIAMCINOLONE ACETONIDE 40 MG/ML
40 INJECTION, SUSPENSION INTRA-ARTICULAR; INTRAMUSCULAR
Status: DISCONTINUED | OUTPATIENT
Start: 2023-12-13 | End: 2023-12-13 | Stop reason: HOSPADM

## 2023-12-13 RX ADMIN — TRIAMCINOLONE ACETONIDE 40 MG: 40 INJECTION, SUSPENSION INTRA-ARTICULAR; INTRAMUSCULAR at 08:12

## 2023-12-13 NOTE — PROGRESS NOTES
Subjective:     Patient ID: Cassandra Campos is a 73 y.o. female.    Chief Complaint: Pain of the Left Hand and Pain of the Right Hand      HPI:  The patient is a 73-year-old female with a left small and right index trigger finger.  She requests injection.  She was last injected bilateral thumb and left ring finger 2023.  This was good results.    Past Medical History:   Diagnosis Date    Arthritis     hands    Bilateral bunions     Borderline glaucoma     De Quervain's disease (radial styloid tenosynovitis)     Gastritis     upper GI 2017    Hydradenitis     Hyperlipidemia     Hypertension     Insomnia     Migraines 2000    Nasal septum perforation     Obesity     Pneumonia     Restrictive airway disease     Sleep apnea     SVT (supraventricular tachycardia) 2013    Trigger finger     Type 2 diabetes mellitus      am 08/10/2021     Past Surgical History:   Procedure Laterality Date    AXILLARY HIDRADENITIS EXCISION Bilateral     BONE EXOSTOSIS EXCISION Right 2018    Procedure: EXCISION, EXOSTOSIS;  Surgeon: Jayro Pedraza Sr., MD;  Location: [facility] OR;  Service: Orthopedics;  Laterality: Right;    BREAST BIOPSY Bilateral     both benign    BREAST SURGERY  1998    CARPAL TUNNEL RELEASE      bilateral    CATARACT EXTRACTION Bilateral     OU     SECTION  1979    CHOLECYSTECTOMY  2014    COLONOSCOPY N/A 10/02/2020    Procedure: COLONOSCOPY;  Surgeon: Tushar Edwards MD;  Location: [facility];  Service: Endoscopy;  Laterality: N/A;    COLONOSCOPY W/ POLYPECTOMY  10/02/2020    Polyps x3, repeat 5 years; Tushar Edwards MD     CYST REMOVAL  2015    sebaceous cyst removed from face    DE QUERVAIN'S RELEASE Left 2020    Procedure: RELEASE, HAND, FOR DEQUERVAIN'S TENOSYNOVITIS;  Surgeon: Jaime Oswald MD;  Location: [facility] OR;  Service: Orthopedics;  Laterality: Left;    DE QUERVAIN'S RELEASE Right 2020    Procedure: RELEASE, HAND, FOR DEQUERVAIN'S  TENOSYNOVITIS;  Surgeon: Jaime Oswald MD;  Location: [facility] OR;  Service: Orthopedics;  Laterality: Right;    EYE SURGERY      gastric sleeve  2017    Dr. Watson    KNEE SURGERY Right     OLECRANON BURSECTOMY Right 2018    Procedure: BURSECTOMY, OLECRANON;  Surgeon: Jayro Pedraza Sr., MD;  Location: [facility] OR;  Service: Orthopedics;  Laterality: Right;    SURGICAL REMOVAL OF BUNION WITH OSTEOTOMY OF METATARSAL BONE Left 05/10/2019    Procedure: BUNIONECTOMY, WITH METATARSAL OSTEOTOMY;  Surgeon: Srinivasan Villanueva DPM;  Location: [facility] OR;  Service: Podiatry;  Laterality: Left;    SURGICAL REMOVAL OF BUNION WITH OSTEOTOMY OF METATARSAL BONE Right 2019    Procedure: BUNIONECTOMY, WITH METATARSAL OSTEOTOMY;  Surgeon: Srinivasan Villanueva DPM;  Location: [facility] OR;  Service: Podiatry;  Laterality: Right;    TONSILLECTOMY, ADENOIDECTOMY  1980s    TRIGGER FINGER RELEASE Right 2015    Dr. Pedraza     Family History   Problem Relation Age of Onset    Prostate cancer Brother     Diabetes Maternal Aunt     Diabetes Cousin     Hypertension Maternal Grandmother      Social History     Socioeconomic History    Marital status:     Number of children: 1   Occupational History    Occupation:  aid   Tobacco Use    Smoking status: Former     Current packs/day: 0.00     Average packs/day: 0.5 packs/day for 42.0 years (21.0 ttl pk-yrs)     Types: Cigarettes     Start date: 1970     Quit date: 2012     Years since quittin.9    Smokeless tobacco: Never   Substance and Sexual Activity    Alcohol use: Yes     Alcohol/week: 1.0 standard drink of alcohol     Types: 1 Glasses of wine per week     Comment: Glass red wine once a every 2 weeks    Drug use: Never    Sexual activity: Not Currently     Partners: Male     Birth control/protection: Abstinence, None   Social History Narrative    Single, part-time teacher. Masters degree biology.      Social Determinants of Health     Financial  Resource Strain: Low Risk  (7/19/2023)    Overall Financial Resource Strain (CARDIA)     Difficulty of Paying Living Expenses: Not hard at all   Food Insecurity: Food Insecurity Present (7/19/2023)    Hunger Vital Sign     Worried About Running Out of Food in the Last Year: Sometimes true     Ran Out of Food in the Last Year: Sometimes true   Transportation Needs: No Transportation Needs (7/19/2023)    PRAPARE - Transportation     Lack of Transportation (Medical): No     Lack of Transportation (Non-Medical): No   Physical Activity: Insufficiently Active (7/19/2023)    Exercise Vital Sign     Days of Exercise per Week: 3 days     Minutes of Exercise per Session: 30 min   Stress: Stress Concern Present (7/19/2023)    [place] [employer] - Occupational Stress Questionnaire     Feeling of Stress : To some extent   Social Connections: Unknown (7/19/2023)    Social Connection and Isolation Panel [NHANES]     Frequency of Communication with Friends and Family: Twice a week     Frequency of Social Gatherings with Friends and Family: Three times a week     Active Member of Clubs or Organizations: Yes     Attends Club or Organization Meetings: 1 to 4 times per year     Marital Status:    Housing Stability: Low Risk  (7/19/2023)    Housing Stability Vital Sign     Unable to Pay for Housing in the Last Year: No     Number of Places Lived in the Last Year: 1     Unstable Housing in the Last Year: No     Medication List with Changes/Refills   Current Medications    ACCU-CHEK GUIDE TEST STRIPS STRP    USE TO TEST TWICE A DAY    ALBUTEROL (PROVENTIL/VENTOLIN HFA) 90 MCG/ACTUATION INHALER    INHALE 2 PUFFS INTO THE LUNGS EVERY 4 HOURS AS NEEDED FOR WHEEZING OR SHORTNESS OF BREATH.    AMITRIPTYLINE (ELAVIL) 150 MG TAB    Take 1 tablet (150 mg total) by mouth every evening.    BLOOD GLUCOSE CONTROL, HIGH (TRUE METRIX LEVEL 3) SOLN    1 each by Other route once daily.    BLOOD-GLUCOSE METER MISC    1 each  by Misc.(Non-Drug; Combo Route) route 2 (two) times a day.    BLOOD-GLUCOSE SENSOR (DEXCOM G7 SENSOR) LUISA    1 Device by Misc.(Non-Drug; Combo Route) route continuous.    CHOLECALCIFEROL, VITAMIN D3, (VITAMIN D3) 25 MCG (1,000 UNIT) CAPSULE    Take 1,000 Units by mouth once daily.    ESZOPICLONE (LUNESTA) 3 MG TAB    Take 1 tablet (3 mg total) by mouth every evening.    LANCING DEVICE WITH LANCETS (ACCU-CHEK SOFT DEV LANCETS) KIT    1 each by Misc.(Non-Drug; Combo Route) route 2 (two) times a day.    LORAZEPAM (ATIVAN) 1 MG TABLET    TAKE 1 TABLET BY MOUTH TWICE A DAY    LOSARTAN (COZAAR) 25 MG TABLET    TAKE 1 TABLET BY MOUTH ONCE  DAILY    MELOXICAM (MOBIC) 15 MG TABLET    Take 1 tablet (15 mg total) by mouth once daily.    METHYLPREDNISOLONE (MEDROL DOSEPACK) 4 MG TABLET    use as directed    METOPROLOL SUCCINATE (TOPROL-XL) 50 MG 24 HR TABLET    TAKE 1 TABLET BY MOUTH ONCE  DAILY    OMEPRAZOLE (PRILOSEC) 20 MG CAPSULE    TAKE 1 CAPSULE BY MOUTH ONCE  DAILY    SEMAGLUTIDE (OZEMPIC) 2 MG/DOSE (8 MG/3 ML) PNIJ    Inject 2 mg into the skin every 7 days.    TRUEPLUS LANCETS 33 GAUGE MISC    TEST BLOOD SUGAR ONE TIME DAILY     Review of patient's allergies indicates:   Allergen Reactions    Statins-hmg-coa reductase inhibitors Other (See Comments)     Elevated liver functions     Review of Systems   Constitutional: Negative for malaise/fatigue.   HENT:  Negative for hearing loss.    Eyes:  Negative for double vision and visual disturbance.   Cardiovascular:  Positive for irregular heartbeat. Negative for chest pain.   Respiratory:  Positive for wheezing. Negative for shortness of breath.    Endocrine: Negative for cold intolerance.   Hematologic/Lymphatic: Does not bruise/bleed easily.   Skin:  Negative for poor wound healing and suspicious lesions.   Musculoskeletal:  Negative for gout, joint pain and joint swelling.   Gastrointestinal:  Negative for nausea and vomiting.   Genitourinary:  Negative for dysuria.    Neurological:  Negative for numbness, paresthesias and sensory change.   Psychiatric/Behavioral:  Positive for altered mental status and substance abuse. Negative for depression and memory loss. The patient has insomnia and is nervous/anxious.    Allergic/Immunologic: Negative for persistent infections.       Objective:   Body mass index is 34.01 kg/m².  There were no vitals filed for this visit.             General    Constitutional: She is oriented to person, place, and time. She appears well-developed and well-nourished. No distress.   HENT:   Head: Normocephalic.   Eyes: EOM are normal.   Pulmonary/Chest: Effort normal.   Neurological: She is oriented to person, place, and time.   Psychiatric: She has a normal mood and affect.             Right Hand/Wrist Exam     Inspection   Scars: Wrist - present Hand -  present  Effusion: Wrist - absent Hand -  absent    Pain   Hand - The patient exhibits pain of the index MCP.    Other     Neuorologic Exam    Median Distribution: normal  Ulnar Distribution: normal  Radial Distribution: normal    Comments:  The patient has well-healed carpal tunnel scar.  She is active triggering of the right index finger with palpable nodule that moves with tendon excursion.      Left Hand/Wrist Exam     Inspection   Scars: Wrist - present Hand -  present  Effusion: Wrist - absent Hand -  absent    Pain   Hand - The patient exhibits pain of the little MCP.    Other     Sensory Exam  Median Distribution: normal  Ulnar Distribution: normal  Radial Distribution: normal    Comments:  The patient has well-healed carpal tunnel scar.  She is active triggering left small finger with a palpable nodule that moves with tendon excursion.          Vascular Exam       Capillary Refill  Right Hand: normal capillary refill  Left Hand: normal capillary refill         radiographs were not obtained today  Assessment:     Encounter Diagnosis   Name Primary?    Trigger finger, acquired Yes    Right index and  left small finger    Plan:     The patient injected right index trigger finger with 0.5 cc of Kenalog and 0.5 cc of 2% plain lidocaine and left small finger again with 0.5 cc of Kenalog and 0.5 cc of 2% plain lidocaine.  She will wait at least 3 months between injections.  She says she may have a trigger finger release if she has recurrence                Disclaimer: This note was prepared using a voice recognition system and is likely to have sound alike errors within the text.

## 2023-12-19 DIAGNOSIS — G47.00 INSOMNIA, UNSPECIFIED TYPE: ICD-10-CM

## 2023-12-19 RX ORDER — TRAZODONE HYDROCHLORIDE 100 MG/1
100 TABLET ORAL NIGHTLY
Qty: 90 TABLET | Refills: 1 | OUTPATIENT
Start: 2023-12-19

## 2023-12-19 NOTE — TELEPHONE ENCOUNTER
Refill Decision Note   Cassandra Campos  is requesting a refill authorization.  Brief Assessment and Rationale for Refill:  Quick Discontinue     Medication Therapy Plan:  The original prescription was discontinued on 5/23/2023 by Emil Zhang MD.      Comments:     Note composed:12:11 PM 12/19/2023             Appointments     Last Visit   7/26/2023 Emil Zhang MD   Next Visit   12/26/2023 Emil Zhang MD           Appointments     Last Visit   7/26/2023 Emil Zhang MD   Next Visit   12/26/2023 Emil Zhang MD

## 2023-12-19 NOTE — TELEPHONE ENCOUNTER
No care due was identified.  Health [facility] Embedded Care Due Messages. Reference number: [id number].   12/19/2023 11:57:24 AM CST

## 2023-12-20 RX ORDER — PRAVASTATIN SODIUM 40 MG/1
1 TABLET ORAL EVERY MORNING
COMMUNITY
End: 2024-02-01

## 2023-12-20 RX ORDER — TRAZODONE HYDROCHLORIDE 100 MG/1
100 TABLET ORAL
COMMUNITY
Start: 2023-07-07 | End: 2024-02-01

## 2023-12-20 NOTE — TELEPHONE ENCOUNTER
No care due was identified.  Health [facility] Embedded Care Due Messages. Reference number: [id number].   12/19/2023 7:39:45 PM CST

## 2023-12-20 NOTE — TELEPHONE ENCOUNTER
Refill Routing Note   Medication(s) are not appropriate for processing by [facility] for the following reason(s):        Outside of protocol    [facility] action(s):  Route               Appointments  past 12m or future 3m with PCP    Date Provider   Last Visit   7/26/2023 Emil Zhang MD   Next Visit   12/26/2023 Emil Zhang MD   ED visits in past 90 days: 0        Note composed:9:26 AM 12/20/2023

## 2023-12-21 ENCOUNTER — PATIENT MESSAGE (OUTPATIENT)
Dept: ADMINISTRATIVE | Facility: HOSPITAL | Age: 73
End: 2023-12-21
Payer: MEDICARE

## 2023-12-21 DIAGNOSIS — E11.9 CONTROLLED TYPE 2 DIABETES MELLITUS WITHOUT COMPLICATION, WITHOUT LONG-TERM CURRENT USE OF INSULIN: Primary | ICD-10-CM

## 2023-12-22 ENCOUNTER — TELEPHONE (OUTPATIENT)
Dept: INTERNAL MEDICINE | Facility: CLINIC | Age: 73
End: 2023-12-22
Payer: MEDICARE

## 2023-12-22 RX ORDER — LORAZEPAM 1 MG/1
1 TABLET ORAL 2 TIMES DAILY
Qty: 60 TABLET | Refills: 2 | Status: SHIPPED | OUTPATIENT
Start: 2023-12-22 | End: 2024-01-18 | Stop reason: SDUPTHER

## 2023-12-22 NOTE — TELEPHONE ENCOUNTER
Replying to Campaign Questionnaire for Overdue HM:  DM Labs.     Labs scheduled with PCP visit 12/26/23

## 2023-12-22 NOTE — TELEPHONE ENCOUNTER
Called pt to inform her that the prescription for lorazepam was refused  because pt needs an appointment. Appointment already scheduled for 12/26/2023.

## 2023-12-26 ENCOUNTER — LAB VISIT (OUTPATIENT)
Dept: LAB | Facility: HOSPITAL | Age: 73
End: 2023-12-26
Attending: FAMILY MEDICINE
Payer: MEDICARE

## 2023-12-26 ENCOUNTER — OFFICE VISIT (OUTPATIENT)
Dept: INTERNAL MEDICINE | Facility: CLINIC | Age: 73
End: 2023-12-26
Payer: MEDICARE

## 2023-12-26 ENCOUNTER — TELEPHONE (OUTPATIENT)
Dept: OPHTHALMOLOGY | Facility: CLINIC | Age: 73
End: 2023-12-26
Payer: MEDICARE

## 2023-12-26 VITALS
OXYGEN SATURATION: 97 % | WEIGHT: 180.31 LBS | DIASTOLIC BLOOD PRESSURE: 86 MMHG | HEART RATE: 100 BPM | SYSTOLIC BLOOD PRESSURE: 130 MMHG | TEMPERATURE: 98 F | BODY MASS INDEX: 31.95 KG/M2 | RESPIRATION RATE: 18 BRPM

## 2023-12-26 DIAGNOSIS — I47.10 PAROXYSMAL SVT (SUPRAVENTRICULAR TACHYCARDIA): ICD-10-CM

## 2023-12-26 DIAGNOSIS — E11.9 CONTROLLED TYPE 2 DIABETES MELLITUS WITHOUT COMPLICATION, WITHOUT LONG-TERM CURRENT USE OF INSULIN: Primary | ICD-10-CM

## 2023-12-26 DIAGNOSIS — E11.69 HYPERLIPIDEMIA ASSOCIATED WITH TYPE 2 DIABETES MELLITUS: Chronic | ICD-10-CM

## 2023-12-26 DIAGNOSIS — E78.5 HYPERLIPIDEMIA ASSOCIATED WITH TYPE 2 DIABETES MELLITUS: Chronic | ICD-10-CM

## 2023-12-26 DIAGNOSIS — I10 PRIMARY HYPERTENSION: ICD-10-CM

## 2023-12-26 DIAGNOSIS — E11.9 CONTROLLED TYPE 2 DIABETES MELLITUS WITHOUT COMPLICATION, WITHOUT LONG-TERM CURRENT USE OF INSULIN: ICD-10-CM

## 2023-12-26 LAB
ALBUMIN SERPL BCP-MCNC: 3.1 G/DL (ref 3.5–5.2)
ALP SERPL-CCNC: 108 U/L (ref 55–135)
ALT SERPL W/O P-5'-P-CCNC: 71 U/L (ref 10–44)
ANION GAP SERPL CALC-SCNC: 10 MMOL/L (ref 8–16)
AST SERPL-CCNC: 32 U/L (ref 10–40)
BILIRUB SERPL-MCNC: 0.2 MG/DL (ref 0.1–1)
BUN SERPL-MCNC: 11 MG/DL (ref 8–23)
CALCIUM SERPL-MCNC: 10.1 MG/DL (ref 8.7–10.5)
CHLORIDE SERPL-SCNC: 101 MMOL/L (ref 95–110)
CHOLEST SERPL-MCNC: 184 MG/DL (ref 120–199)
CHOLEST/HDLC SERPL: 3.8 {RATIO} (ref 2–5)
CO2 SERPL-SCNC: 26 MMOL/L (ref 23–29)
CREAT SERPL-MCNC: 0.7 MG/DL (ref 0.5–1.4)
EST. GFR  (NO RACE VARIABLE): >60 ML/MIN/1.73 M^2
ESTIMATED AVG GLUCOSE: 111 MG/DL (ref 68–131)
GLUCOSE SERPL-MCNC: 93 MG/DL (ref 70–110)
HBA1C MFR BLD: 5.5 % (ref 4–5.6)
HDLC SERPL-MCNC: 48 MG/DL (ref 40–75)
HDLC SERPL: 26.1 % (ref 20–50)
LDLC SERPL CALC-MCNC: 110.4 MG/DL (ref 63–159)
NONHDLC SERPL-MCNC: 136 MG/DL
POTASSIUM SERPL-SCNC: 4.1 MMOL/L (ref 3.5–5.1)
PROT SERPL-MCNC: 8.1 G/DL (ref 6–8.4)
SODIUM SERPL-SCNC: 137 MMOL/L (ref 136–145)
TRIGL SERPL-MCNC: 128 MG/DL (ref 30–150)

## 2023-12-26 PROCEDURE — 3288F FALL RISK ASSESSMENT DOCD: CPT | Mod: CPTII,S$GLB,, | Performed by: FAMILY MEDICINE

## 2023-12-26 PROCEDURE — 3061F NEG MICROALBUMINURIA REV: CPT | Mod: CPTII,S$GLB,, | Performed by: FAMILY MEDICINE

## 2023-12-26 PROCEDURE — 4010F PR ACE/ARB THEARPY RXD/TAKEN: ICD-10-PCS | Mod: CPTII,S$GLB,, | Performed by: FAMILY MEDICINE

## 2023-12-26 PROCEDURE — 3075F PR MOST RECENT SYSTOLIC BLOOD PRESS GE 130-139MM HG: ICD-10-PCS | Mod: CPTII,S$GLB,, | Performed by: FAMILY MEDICINE

## 2023-12-26 PROCEDURE — 3008F BODY MASS INDEX DOCD: CPT | Mod: CPTII,S$GLB,, | Performed by: FAMILY MEDICINE

## 2023-12-26 PROCEDURE — 3079F DIAST BP 80-89 MM HG: CPT | Mod: CPTII,S$GLB,, | Performed by: FAMILY MEDICINE

## 2023-12-26 PROCEDURE — 3066F PR DOCUMENTATION OF TREATMENT FOR NEPHROPATHY: ICD-10-PCS | Mod: CPTII,S$GLB,, | Performed by: FAMILY MEDICINE

## 2023-12-26 PROCEDURE — 1125F AMNT PAIN NOTED PAIN PRSNT: CPT | Mod: CPTII,S$GLB,, | Performed by: FAMILY MEDICINE

## 2023-12-26 PROCEDURE — 3008F PR BODY MASS INDEX (BMI) DOCUMENTED: ICD-10-PCS | Mod: CPTII,S$GLB,, | Performed by: FAMILY MEDICINE

## 2023-12-26 PROCEDURE — 3066F NEPHROPATHY DOC TX: CPT | Mod: CPTII,S$GLB,, | Performed by: FAMILY MEDICINE

## 2023-12-26 PROCEDURE — 4010F ACE/ARB THERAPY RXD/TAKEN: CPT | Mod: CPTII,S$GLB,, | Performed by: FAMILY MEDICINE

## 2023-12-26 PROCEDURE — 99214 OFFICE O/P EST MOD 30 MIN: CPT | Mod: S$GLB,,, | Performed by: FAMILY MEDICINE

## 2023-12-26 PROCEDURE — 36415 COLL VENOUS BLD VENIPUNCTURE: CPT | Performed by: FAMILY MEDICINE

## 2023-12-26 PROCEDURE — 99999 PR PBB SHADOW E&M-EST. PATIENT-LVL III: ICD-10-PCS | Mod: PBBFAC,,, | Performed by: FAMILY MEDICINE

## 2023-12-26 PROCEDURE — 80061 LIPID PANEL: CPT | Performed by: FAMILY MEDICINE

## 2023-12-26 PROCEDURE — 1125F PR PAIN SEVERITY QUANTIFIED, PAIN PRESENT: ICD-10-PCS | Mod: CPTII,S$GLB,, | Performed by: FAMILY MEDICINE

## 2023-12-26 PROCEDURE — 1101F PR PT FALLS ASSESS DOC 0-1 FALLS W/OUT INJ PAST YR: ICD-10-PCS | Mod: CPTII,S$GLB,, | Performed by: FAMILY MEDICINE

## 2023-12-26 PROCEDURE — 99214 PR OFFICE/OUTPT VISIT, EST, LEVL IV, 30-39 MIN: ICD-10-PCS | Mod: S$GLB,,, | Performed by: FAMILY MEDICINE

## 2023-12-26 PROCEDURE — 3044F HG A1C LEVEL LT 7.0%: CPT | Mod: CPTII,S$GLB,, | Performed by: FAMILY MEDICINE

## 2023-12-26 PROCEDURE — 1101F PT FALLS ASSESS-DOCD LE1/YR: CPT | Mod: CPTII,S$GLB,, | Performed by: FAMILY MEDICINE

## 2023-12-26 PROCEDURE — 3079F PR MOST RECENT DIASTOLIC BLOOD PRESSURE 80-89 MM HG: ICD-10-PCS | Mod: CPTII,S$GLB,, | Performed by: FAMILY MEDICINE

## 2023-12-26 PROCEDURE — 3044F PR MOST RECENT HEMOGLOBIN A1C LEVEL <7.0%: ICD-10-PCS | Mod: CPTII,S$GLB,, | Performed by: FAMILY MEDICINE

## 2023-12-26 PROCEDURE — 3288F PR FALLS RISK ASSESSMENT DOCUMENTED: ICD-10-PCS | Mod: CPTII,S$GLB,, | Performed by: FAMILY MEDICINE

## 2023-12-26 PROCEDURE — 83036 HEMOGLOBIN GLYCOSYLATED A1C: CPT | Performed by: FAMILY MEDICINE

## 2023-12-26 PROCEDURE — 3061F PR NEG MICROALBUMINURIA RESULT DOCUMENTED/REVIEW: ICD-10-PCS | Mod: CPTII,S$GLB,, | Performed by: FAMILY MEDICINE

## 2023-12-26 PROCEDURE — 99999 PR PBB SHADOW E&M-EST. PATIENT-LVL III: CPT | Mod: PBBFAC,,, | Performed by: FAMILY MEDICINE

## 2023-12-26 PROCEDURE — 3075F SYST BP GE 130 - 139MM HG: CPT | Mod: CPTII,S$GLB,, | Performed by: FAMILY MEDICINE

## 2023-12-26 PROCEDURE — 80053 COMPREHEN METABOLIC PANEL: CPT | Performed by: FAMILY MEDICINE

## 2023-12-26 RX ORDER — TIZANIDINE 2 MG/1
2 TABLET ORAL 2 TIMES DAILY
Qty: 30 TABLET | Refills: 1 | Status: SHIPPED | OUTPATIENT
Start: 2023-12-26 | End: 2024-02-01

## 2023-12-26 NOTE — TELEPHONE ENCOUNTER
Called patient at 3:50pm LVM to call the main number if she needs to reschedule appt she has scheduled with Dr. Jason.    SRS

## 2023-12-26 NOTE — TELEPHONE ENCOUNTER
----- Message from Marko Rizvi sent at 12/26/2023  2:58 PM CST -----  Contact: self [phone]  Pt is calling regarding appt . Please call back at [phone]

## 2023-12-26 NOTE — TELEPHONE ENCOUNTER
----- Message from Marko Rizvi sent at 12/26/2023  2:58 PM CST -----  Contact: self [phone]  Pt is calling regarding appt . Please call back at [phone]

## 2023-12-26 NOTE — PROGRESS NOTES
Subjective:       Patient ID: Cassandra Campos is a 73 y.o. female.    Chief Complaint: Follow-up (6M F/U per last labs. Patient had elevated lfts at that time. Patient states whole body is aching. )    Follow-up hypertension hyperlipidemia diabetes SVT elevated BMI.  She is also followed by orthopedics regards a fall as well as carpal tunnel syndrome.  She is requesting muscle relaxant for neck pain.  She is currently on meloxicam.  She lost 12 lb of weight on Ozempic.  She reports improvement in her diabetic diet.  She denies chest pain palpitations shortness of breath edema she denies hypoglycemic symptoms.    Follow-up  Pertinent negatives include no abdominal pain, chest pain, coughing, headaches or weakness.     Review of Systems   Constitutional:  Negative for appetite change and unexpected weight change.   Respiratory:  Negative for cough, chest tightness, shortness of breath and wheezing.    Cardiovascular:  Negative for chest pain, palpitations and leg swelling.   Gastrointestinal:  Negative for abdominal distention and abdominal pain.   Endocrine: Negative for polydipsia and polyuria.   Musculoskeletal:  Negative for neck stiffness.   Neurological:  Negative for dizziness, weakness, light-headedness and headaches.   Psychiatric/Behavioral:  Positive for sleep disturbance. The patient is nervous/anxious.        Objective:      Physical Exam  Constitutional:       Appearance: She is not ill-appearing or diaphoretic.   Cardiovascular:      Rate and Rhythm: Normal rate and regular rhythm.      Heart sounds: No murmur heard.     No gallop.   Pulmonary:      Effort: Pulmonary effort is normal. No respiratory distress.      Breath sounds: No wheezing, rhonchi or rales.   Abdominal:      General: There is no distension.   Skin:     General: Skin is warm and dry.      Coloration: Skin is not pale.      Findings: No erythema.   Neurological:      Mental Status: She is alert.   Psychiatric:         Mood and  Affect: Mood normal.         Behavior: Behavior normal.         Lab Visit on 07/26/2023   Component Date Value Ref Range Status    Sodium 07/26/2023 138  136 - 145 mmol/L Final    Potassium 07/26/2023 4.3  3.5 - 5.1 mmol/L Final    Chloride 07/26/2023 103  95 - 110 mmol/L Final    CO2 07/26/2023 24  23 - 29 mmol/L Final    Glucose 07/26/2023 88  70 - 110 mg/dL Final    BUN 07/26/2023 12  8 - 23 mg/dL Final    Creatinine 07/26/2023 0.7  0.5 - 1.4 mg/dL Final    Calcium 07/26/2023 10.5  8.7 - 10.5 mg/dL Final    Total Protein 07/26/2023 8.7 (H)  6.0 - 8.4 g/dL Final    Albumin 07/26/2023 3.7  3.5 - 5.2 g/dL Final    Total Bilirubin 07/26/2023 0.3  0.1 - 1.0 mg/dL Final    Alkaline Phosphatase 07/26/2023 94  55 - 135 U/L Final    AST 07/26/2023 38  10 - 40 U/L Final    ALT 07/26/2023 61 (H)  10 - 44 U/L Final    eGFR 07/26/2023 >60.0  >60 mL/min/1.73 m^2 Final    Anion Gap 07/26/2023 11  8 - 16 mmol/L Final     Assessment:       1. Controlled type 2 diabetes mellitus without complication, without long-term current use of insulin    2. Primary hypertension    3. Paroxysmal SVT (supraventricular tachycardia)    4. Hyperlipidemia associated with type 2 diabetes mellitus    5. BMI 31.0-31.9,adult        Plan:   Blood pressure controlled lab was ordered to include CMP A1c lipid profile.  Prescription for Zanaflex as she requested.  Medications reviewed.  Continue diabetic diet continue Ozempic.  Follow-up in 6 weeks regards diabetes and elevated BMI she reports she quit smoking cigarettes about 40 years ago.      Controlled type 2 diabetes mellitus without complication, without long-term current use of insulin    Primary hypertension    Paroxysmal SVT (supraventricular tachycardia)    Hyperlipidemia associated with type 2 diabetes mellitus    BMI 31.0-31.9,adult    Other orders  -     tiZANidine (ZANAFLEX) 2 MG tablet; Take 1 tablet (2 mg total) by mouth 2 (two) times a day.  Dispense: 30 tablet; Refill: 1

## 2023-12-27 ENCOUNTER — TELEPHONE (OUTPATIENT)
Dept: INTERNAL MEDICINE | Facility: CLINIC | Age: 73
End: 2023-12-27
Payer: MEDICARE

## 2023-12-27 ENCOUNTER — HOSPITAL ENCOUNTER (EMERGENCY)
Facility: HOSPITAL | Age: 73
Discharge: HOME | End: 2023-12-27
Attending: EMERGENCY MEDICINE
Payer: MEDICARE

## 2023-12-27 ENCOUNTER — NURSE TRIAGE (OUTPATIENT)
Dept: ADMINISTRATIVE | Facility: CLINIC | Age: 73
End: 2023-12-27
Payer: MEDICARE

## 2023-12-27 VITALS
DIASTOLIC BLOOD PRESSURE: 70 MMHG | TEMPERATURE: 98 F | SYSTOLIC BLOOD PRESSURE: 130 MMHG | OXYGEN SATURATION: 100 % | RESPIRATION RATE: 18 BRPM | HEART RATE: 88 BPM

## 2023-12-27 DIAGNOSIS — M79.10 MYALGIA: Primary | ICD-10-CM

## 2023-12-27 LAB
ALBUMIN SERPL BCP-MCNC: 3.2 G/DL (ref 3.5–5.2)
ALP SERPL-CCNC: 110 U/L (ref 55–135)
ALT SERPL W/O P-5'-P-CCNC: 58 U/L (ref 10–44)
ANION GAP SERPL CALC-SCNC: 10 MMOL/L (ref 8–16)
AST SERPL-CCNC: 26 U/L (ref 10–40)
BASOPHILS # BLD AUTO: 0.03 K/UL (ref 0–0.2)
BASOPHILS NFR BLD: 0.4 % (ref 0–1.9)
BILIRUB SERPL-MCNC: 0.2 MG/DL (ref 0.1–1)
BILIRUB UR QL STRIP: NEGATIVE
BUN SERPL-MCNC: 14 MG/DL (ref 8–23)
CALCIUM SERPL-MCNC: 9.7 MG/DL (ref 8.7–10.5)
CHLORIDE SERPL-SCNC: 103 MMOL/L (ref 95–110)
CK SERPL-CCNC: 30 U/L (ref 20–180)
CLARITY UR: CLEAR
CO2 SERPL-SCNC: 24 MMOL/L (ref 23–29)
COLOR UR: COLORLESS
CREAT SERPL-MCNC: 0.7 MG/DL (ref 0.5–1.4)
DIFFERENTIAL METHOD BLD: ABNORMAL
EOSINOPHIL # BLD AUTO: 0.1 K/UL (ref 0–0.5)
EOSINOPHIL NFR BLD: 0.9 % (ref 0–8)
ERYTHROCYTE [DISTWIDTH] IN BLOOD BY AUTOMATED COUNT: 13.9 % (ref 11.5–14.5)
EST. GFR  (NO RACE VARIABLE): >60 ML/MIN/1.73 M^2
GLUCOSE SERPL-MCNC: 91 MG/DL (ref 70–110)
GLUCOSE UR QL STRIP: NEGATIVE
HCT VFR BLD AUTO: 40.2 % (ref 37–48.5)
HGB BLD-MCNC: 13.5 G/DL (ref 12–16)
HGB UR QL STRIP: NEGATIVE
IMM GRANULOCYTES # BLD AUTO: 0.02 K/UL (ref 0–0.04)
IMM GRANULOCYTES NFR BLD AUTO: 0.2 % (ref 0–0.5)
INFLUENZA A, MOLECULAR: NEGATIVE
INFLUENZA B, MOLECULAR: NEGATIVE
KETONES UR QL STRIP: NEGATIVE
LEUKOCYTE ESTERASE UR QL STRIP: NEGATIVE
LYMPHOCYTES # BLD AUTO: 2.6 K/UL (ref 1–4.8)
LYMPHOCYTES NFR BLD: 31.2 % (ref 18–48)
MCH RBC QN AUTO: 25.4 PG (ref 27–31)
MCHC RBC AUTO-ENTMCNC: 33.6 G/DL (ref 32–36)
MCV RBC AUTO: 76 FL (ref 82–98)
MONOCYTES # BLD AUTO: 0.7 K/UL (ref 0.3–1)
MONOCYTES NFR BLD: 7.7 % (ref 4–15)
NEUTROPHILS # BLD AUTO: 5 K/UL (ref 1.8–7.7)
NEUTROPHILS NFR BLD: 59.6 % (ref 38–73)
NITRITE UR QL STRIP: NEGATIVE
NRBC BLD-RTO: 0 /100 WBC
PH UR STRIP: 6 [PH] (ref 5–8)
PLATELET # BLD AUTO: 311 K/UL (ref 150–450)
PMV BLD AUTO: 9.2 FL (ref 9.2–12.9)
POTASSIUM SERPL-SCNC: 4 MMOL/L (ref 3.5–5.1)
PROT SERPL-MCNC: 8.3 G/DL (ref 6–8.4)
PROT UR QL STRIP: NEGATIVE
RBC # BLD AUTO: 5.31 M/UL (ref 4–5.4)
SARS-COV-2 RDRP RESP QL NAA+PROBE: NEGATIVE
SODIUM SERPL-SCNC: 137 MMOL/L (ref 136–145)
SP GR UR STRIP: 1.01 (ref 1–1.03)
SPECIMEN SOURCE: NORMAL
URN SPEC COLLECT METH UR: ABNORMAL
UROBILINOGEN UR STRIP-ACNC: NEGATIVE EU/DL
WBC # BLD AUTO: 8.45 K/UL (ref 3.9–12.7)

## 2023-12-27 PROCEDURE — 87502 INFLUENZA DNA AMP PROBE: CPT | Performed by: EMERGENCY MEDICINE

## 2023-12-27 PROCEDURE — 25000003 PHARM REV CODE 250: Performed by: EMERGENCY MEDICINE

## 2023-12-27 PROCEDURE — 80053 COMPREHEN METABOLIC PANEL: CPT | Performed by: EMERGENCY MEDICINE

## 2023-12-27 PROCEDURE — 85025 COMPLETE CBC W/AUTO DIFF WBC: CPT | Performed by: EMERGENCY MEDICINE

## 2023-12-27 PROCEDURE — U0002 COVID-19 LAB TEST NON-CDC: HCPCS | Performed by: EMERGENCY MEDICINE

## 2023-12-27 PROCEDURE — 99284 EMERGENCY DEPT VISIT MOD MDM: CPT | Mod: 25

## 2023-12-27 PROCEDURE — 96361 HYDRATE IV INFUSION ADD-ON: CPT

## 2023-12-27 PROCEDURE — 81003 URINALYSIS AUTO W/O SCOPE: CPT | Performed by: EMERGENCY MEDICINE

## 2023-12-27 PROCEDURE — 82550 ASSAY OF CK (CPK): CPT | Performed by: EMERGENCY MEDICINE

## 2023-12-27 PROCEDURE — 96360 HYDRATION IV INFUSION INIT: CPT

## 2023-12-27 RX ORDER — HYDROCODONE BITARTRATE AND ACETAMINOPHEN 5; 325 MG/1; MG/1
2 TABLET ORAL
Status: COMPLETED | OUTPATIENT
Start: 2023-12-27 | End: 2023-12-27

## 2023-12-27 RX ORDER — TRAMADOL HYDROCHLORIDE 50 MG/1
50 TABLET ORAL EVERY 6 HOURS PRN
Qty: 12 TABLET | Refills: 0 | Status: SHIPPED | OUTPATIENT
Start: 2023-12-27 | End: 2024-02-01

## 2023-12-27 RX ADMIN — SODIUM CHLORIDE 1000 ML: 0.9 INJECTION, SOLUTION INTRAVENOUS at 01:12

## 2023-12-27 RX ADMIN — HYDROCODONE BITARTRATE AND ACETAMINOPHEN 2 TABLET: 5; 325 TABLET ORAL at 01:12

## 2023-12-27 NOTE — ED PROVIDER NOTES
"SCRIBE #1 NOTE: ITiera, am scribing for, and in the presence of, Michael Curran MD. I have scribed the HPI, ROS, PEx.     SCRIBE #2 NOTE: I, Analisa Phipps, am scribing for, and in the presence of,  Shaila Mathews MD. I have scribed the remaining portions of the note not scribed by Scribe #1.      History     Chief Complaint   Patient presents with    Generalized Body Aches     Pt complaining of body aches and weakness x2-3 days      Review of patient's allergies indicates:  No Known Allergies      History of Present Illness     HPI    12/27/2023, 1:58 PM  History obtained from the patient      History of Present Illness: Cassandra Campos is a 73 y.o. female patient with a PMHx of HLD, HTN, DM who presents to the Emergency Department for evaluation of generalized myalgias which onset 2-3 days PTA. Symptoms are constant and moderate in severity. No mitigating or exacerbating factors reported. Pt also complains of weakness and swollen lymph nodes in the L neck. She states that "she is in pain all over." Patient denies any fever, chills, cough, congestion, headaches, n/v/d, and all other sxs at this time. No prior Tx mentioned. Pt denies any PMHx of cancer. No further complaints or concerns at this time.       Arrival mode: EMS    PCP: Emil Zhang MD        Past Medical History:  Past Medical History:   Diagnosis Date    Arthritis     hands    Bilateral bunions     Borderline glaucoma     De Quervain's disease (radial styloid tenosynovitis)     Gastritis     upper GI 2/2017    Hydradenitis     Hyperlipidemia     Hypertension     Insomnia     Migraines 02/01/2000    Nasal septum perforation     Obesity     Pneumonia     Restrictive airway disease     Sleep apnea     SVT (supraventricular tachycardia) 09/2013    Trigger finger     Type 2 diabetes mellitus 2012     am 08/10/2021       Past Surgical History:  Past Surgical History:   Procedure Laterality Date    AXILLARY HIDRADENITIS " EXCISION Bilateral     BONE EXOSTOSIS EXCISION Right 2018    Procedure: EXCISION, EXOSTOSIS;  Surgeon: Jayro Pedraza Sr., MD;  Location: [facility] OR;  Service: Orthopedics;  Laterality: Right;    BREAST BIOPSY Bilateral     both benign    BREAST SURGERY  1998    CARPAL TUNNEL RELEASE      bilateral    CATARACT EXTRACTION Bilateral     OU     SECTION  1979    CHOLECYSTECTOMY  2014    COLONOSCOPY N/A 10/02/2020    Procedure: COLONOSCOPY;  Surgeon: Tushar Edwards MD;  Location: [facility];  Service: Endoscopy;  Laterality: N/A;    COLONOSCOPY W/ POLYPECTOMY  10/02/2020    Polyps x3, repeat 5 years; Tushar Edwards MD     CYST REMOVAL  2015    sebaceous cyst removed from face    DE QUERVAIN'S RELEASE Left 2020    Procedure: RELEASE, HAND, FOR DEQUERVAIN'S TENOSYNOVITIS;  Surgeon: Jaime Oswald MD;  Location: [facility] OR;  Service: Orthopedics;  Laterality: Left;    DE QUERVAIN'S RELEASE Right 2020    Procedure: RELEASE, HAND, FOR DEQUERVAIN'S TENOSYNOVITIS;  Surgeon: Jaime Oswald MD;  Location: [facility];  Service: Orthopedics;  Laterality: Right;    EYE SURGERY      gastric sleeve  2017    Dr. Watson    KNEE SURGERY Right     OLECRANON BURSECTOMY Right 2018    Procedure: BURSECTOMY, OLECRANON;  Surgeon: Jayro Pedraza Sr., MD;  Location: [facility];  Service: Orthopedics;  Laterality: Right;    SURGICAL REMOVAL OF BUNION WITH OSTEOTOMY OF METATARSAL BONE Left 05/10/2019    Procedure: BUNIONECTOMY, WITH METATARSAL OSTEOTOMY;  Surgeon: Srinivasan Villanueva DPM;  Location: [facility];  Service: Podiatry;  Laterality: Left;    SURGICAL REMOVAL OF BUNION WITH OSTEOTOMY OF METATARSAL BONE Right 2019    Procedure: BUNIONECTOMY, WITH METATARSAL OSTEOTOMY;  Surgeon: Srinivasan Villanueva DPM;  Location: [facility];  Service: Podiatry;  Laterality: Right;    TONSILLECTOMY, ADENOIDECTOMY  1980s    TRIGGER FINGER RELEASE Right 2015    Dr. Pedraza         Family History:  Family  History   Problem Relation Age of Onset    Prostate cancer Brother     Diabetes Maternal Aunt     Diabetes Cousin     Hypertension Maternal Grandmother        Social History:  Social History     Tobacco Use    Smoking status: Former     Current packs/day: 0.00     Average packs/day: 0.5 packs/day for 42.0 years (21.0 ttl pk-yrs)     Types: Cigarettes     Start date: 1970     Quit date: 2012     Years since quittin.9     Passive exposure: Past    Smokeless tobacco: Never   Substance and Sexual Activity    Alcohol use: Not Currently     Alcohol/week: 1.0 standard drink of alcohol     Types: 1 Glasses of wine per week     Comment: Glass red wine once a every 2 weeks    Drug use: Never    Sexual activity: Not Currently     Partners: Female     Birth control/protection: Abstinence, None        Review of Systems     Review of Systems   Constitutional:  Negative for chills and fever.   HENT:  Negative for congestion and sore throat.    Respiratory:  Negative for cough and shortness of breath.    Cardiovascular:  Negative for chest pain.   Gastrointestinal:  Negative for diarrhea, nausea and vomiting.   Genitourinary:  Negative for dysuria.   Musculoskeletal:  Positive for myalgias (generalized). Negative for back pain.   Skin:  Negative for rash.   Neurological:  Positive for weakness. Negative for headaches.   Hematological:  Does not bruise/bleed easily.   All other systems reviewed and are negative.     Physical Exam     Initial Vitals [23 1257]   BP Pulse Resp Temp SpO2   134/68 86 18 98 °F (36.7 °C) 100 %      MAP       --          Physical Exam  Nursing Notes and Vital Signs Reviewed.  Constitutional: Patient is in no acute distress. Well-developed and well-nourished.  Head: Atraumatic. Normocephalic.  Eyes: PERRL. EOM intact. Conjunctivae are not pale. No scleral icterus.  ENT: Mucous membranes are moist. Oropharynx is clear and symmetric.    Neck: Tender jayna-cervical lymphadenotomy in L  neck.  Cardiovascular: Regular rate. Regular rhythm. No murmurs, rubs, or gallops. Distal pulses are 2+ and symmetric.  Pulmonary/Chest: No respiratory distress. Clear to auscultation bilaterally. No wheezing or rales.  Abdominal: Soft and non-distended.  There is no tenderness.  No rebound, guarding, or rigidity. Good bowel sounds.  Genitourinary: No CVA tenderness  Musculoskeletal: Moves all extremities. No obvious deformities. No edema. No calf tenderness.  Skin: Warm and dry.  Neurological:  Alert, awake, and appropriate.  Normal speech.  No acute focal neurological deficits are appreciated.  Psychiatric: Normal affect. Good eye contact. Appropriate in content.     ED Course   Procedures  ED Vital Signs:  Vitals:    12/27/23 1257 12/27/23 1358 12/27/23 1953   BP: 134/68  130/70   Pulse: 86  88   Resp: 18 18 18   Temp: 98 °F (36.7 °C)  98 °F (36.7 °C)   TempSrc: Oral  Oral   SpO2: 100%         Abnormal Lab Results:  Labs Reviewed   CBC W/ AUTO DIFFERENTIAL - Abnormal; Notable for the following components:       Result Value    MCV 76 (*)     MCH 25.4 (*)     All other components within normal limits   COMPREHENSIVE METABOLIC PANEL - Abnormal; Notable for the following components:    Albumin 3.2 (*)     ALT 58 (*)     All other components within normal limits   URINALYSIS, REFLEX TO URINE CULTURE - Abnormal; Notable for the following components:    Color, UA Colorless (*)     All other components within normal limits    Narrative:     Specimen Source->Urine   INFLUENZA A & B BY MOLECULAR   SARS-COV-2 RNA AMPLIFICATION, QUAL   CK        All Lab Results:  Results for orders placed or performed during the hospital encounter of 12/27/23   Influenza A & B by Molecular    Specimen: Nasopharyngeal Swab   Result Value Ref Range    Influenza A, Molecular Negative Negative    Influenza B, Molecular Negative Negative    Flu A & B Source NP    COVID-19 Rapid Screening   Result Value Ref Range    SARS-CoV-2 RNA, Amplification,  Qual Negative Negative   CBC auto differential   Result Value Ref Range    WBC 8.45 3.90 - 12.70 K/uL    RBC 5.31 4.00 - 5.40 M/uL    Hemoglobin 13.5 12.0 - 16.0 g/dL    Hematocrit 40.2 37.0 - 48.5 %    MCV 76 (L) 82 - 98 fL    MCH 25.4 (L) 27.0 - 31.0 pg    MCHC 33.6 32.0 - 36.0 g/dL    RDW 13.9 11.5 - 14.5 %    Platelets 311 150 - 450 K/uL    MPV 9.2 9.2 - 12.9 fL    Immature Granulocytes 0.2 0.0 - 0.5 %    Gran # (ANC) 5.0 1.8 - 7.7 K/uL    Immature Grans (Abs) 0.02 0.00 - 0.04 K/uL    Lymph # 2.6 1.0 - 4.8 K/uL    Mono # 0.7 0.3 - 1.0 K/uL    Eos # 0.1 0.0 - 0.5 K/uL    Baso # 0.03 0.00 - 0.20 K/uL    nRBC 0 0 /100 WBC    Gran % 59.6 38.0 - 73.0 %    Lymph % 31.2 18.0 - 48.0 %    Mono % 7.7 4.0 - 15.0 %    Eosinophil % 0.9 0.0 - 8.0 %    Basophil % 0.4 0.0 - 1.9 %    Differential Method Automated    Comprehensive metabolic panel   Result Value Ref Range    Sodium 137 136 - 145 mmol/L    Potassium 4.0 3.5 - 5.1 mmol/L    Chloride 103 95 - 110 mmol/L    CO2 24 23 - 29 mmol/L    Glucose 91 70 - 110 mg/dL    BUN 14 8 - 23 mg/dL    Creatinine 0.7 0.5 - 1.4 mg/dL    Calcium 9.7 8.7 - 10.5 mg/dL    Total Protein 8.3 6.0 - 8.4 g/dL    Albumin 3.2 (L) 3.5 - 5.2 g/dL    Total Bilirubin 0.2 0.1 - 1.0 mg/dL    Alkaline Phosphatase 110 55 - 135 U/L    AST 26 10 - 40 U/L    ALT 58 (H) 10 - 44 U/L    eGFR >60 >60 mL/min/1.73 m^2    Anion Gap 10 8 - 16 mmol/L   CPK   Result Value Ref Range    CPK 30 20 - 180 U/L   Urinalysis, Reflex to Urine Culture Urine, Clean Catch    Specimen: Urine   Result Value Ref Range    Specimen UA Urine, Clean Catch     Color, UA Colorless (A) Yellow, Straw, Yoon    Appearance, UA Clear Clear    pH, UA 6.0 5.0 - 8.0    Specific Gravity, UA 1.015 1.005 - 1.030    Protein, UA Negative Negative    Glucose, UA Negative Negative    Ketones, UA Negative Negative    Bilirubin (UA) Negative Negative    Occult Blood UA Negative Negative    Nitrite, UA Negative Negative    Urobilinogen, UA Negative <2.0  EU/dL    Leukocytes, UA Negative Negative     *Note: Due to a large number of results and/or encounters for the requested time period, some results have not been displayed. A complete set of results can be found in Results Review.        Imaging Results:  Imaging Results    None                 The Emergency Provider reviewed the vital signs and test results, which are outlined above.     ED Discussion     4:00 PM: Dr. Curran transfers care of patient to Dr. Mathews pending lab results.     7:21 PM: Reassessed pt at this time.Discussed with pt all pertinent ED information and results. Discussed pt dx and plan of tx. Gave pt all f/u and return to the ED instructions. All questions and concerns were addressed at this time. Pt expresses understanding of information and instructions, and is comfortable with plan to discharge. Pt is stable for discharge.    I discussed with patient and/or family/caretaker that evaluation in the ED does not suggest any emergent or life threatening medical conditions requiring immediate intervention beyond what was provided in the ED, and I believe patient is safe for discharge.  Regardless, an unremarkable evaluation in the ED does not preclude the development or presence of a serious of life threatening condition. As such, patient was instructed to return immediately for any worsening or change in current symptoms.         Medical Decision Making  Amount and/or Complexity of Data Reviewed  External Data Reviewed: notes.     Details: External records reviewed including telephone call with Emil Zhang (Family Medicine) today, 12/27/2023.    Labs: ordered. Decision-making details documented in ED Course.    Risk  Prescription drug management.                ED Medication(s):  Medications   HYDROcodone-acetaminophen 5-325 mg per tablet 2 tablet (2 tablets Oral Given 12/27/23 1358)   sodium chloride 0.9% bolus 1,000 mL 1,000 mL (0 mLs Intravenous Stopped 12/27/23 4857)       Discharge  Medication List as of 12/27/2023  7:06 PM           Follow-up Information       Schedule an appointment as soon as possible for a visit  with Emil Zhang MD.    Specialty: Family Medicine  Why: As needed  Contact information:  [address] [facility] [address] [phone]                                 Scribe Attestation:   Scribe #1: I performed the above scribed service and the documentation accurately describes the services I performed. I attest to the accuracy of the note.     Attending:   Physician Attestation Statement for Scribe #1: I, Michael Curran MD, personally performed the services described in this documentation, as scribed by Tiera Villavicencio, in my presence, and it is both accurate and complete.       Scribe Attestation:   Scribe #2: I performed the above scribed service and the documentation accurately describes the services I performed. I attest to the accuracy of the note.    Attending Attestation:           Physician Attestation for Scribe:    Physician Attestation Statement for Scribe #2: I, Shaila Mathews MD, reviewed documentation, as scribed by Analisa Phipps in my presence, and it is both accurate and complete. I also acknowledge and confirm the content of the note done by Traciibe #1.           Clinical Impression       ICD-10-CM ICD-9-CM   1. Myalgia  M79.10 729.1       Disposition:   Disposition: Discharged  Condition: Stable        Shaila Mathews MD  12/28/23 0041

## 2023-12-27 NOTE — TELEPHONE ENCOUNTER
Pt states two weeks ago she fell went to ortho and was told nothing was tore then had labs yesterday and was given a muscle relaxer.states this morning she  can't walk without falling and can't lift arms above her head and states she feels disoriented. Per protocol instructed pt to hang up and call 911 now.   Reason for Disposition   Difficult to awaken or acting confused (e.g., disoriented, slurred speech)    Additional Information   Negative: SEVERE difficulty breathing (e.g., struggling for each breath, speaks in single words)   Negative: Shock suspected (e.g., cold/pale/clammy skin, too weak to stand, low BP, rapid pulse)    Protocols used: Weakness (Generalized) and Fatigue-A-OH

## 2023-12-28 NOTE — ED NOTES
Patient identifiers verified and correct for Cassandra Campos.    Pt present to er with generalized body.  LOC: The patient is awake, alert and aware of environment with an appropriate affect, the patient is oriented x 3 and speaking appropriately.  APPEARANCE: Patient resting comfortably and in no acute distress, patient is clean and well groomed, patient's clothing is properly fastened.  SKIN: The skin is warm and dry, color consistent with ethnicity, patient has normal skin turgor and moist mucus membranes, skin intact, no breakdown or bruising noted.  MUSCULOSKELETAL: Patient moving all extremities spontaneously.  RESPIRATORY: Airway is open and patent, respirations are spontaneous.  CARDIAC: Patient has a normal rate, no periphreal edema noted, capillary refill < 3 seconds.  ABDOMEN: Soft and non tender to palpation.

## 2024-01-03 ENCOUNTER — OFFICE VISIT (OUTPATIENT)
Dept: ORTHOPEDICS | Facility: CLINIC | Age: 74
End: 2024-01-03
Payer: MEDICARE

## 2024-01-03 VITALS — BODY MASS INDEX: 31.95 KG/M2 | HEIGHT: 63 IN

## 2024-01-03 DIAGNOSIS — R20.2 NUMBNESS AND TINGLING: Primary | ICD-10-CM

## 2024-01-03 DIAGNOSIS — G56.03 CARPAL TUNNEL SYNDROME ON BOTH SIDES: Primary | ICD-10-CM

## 2024-01-03 DIAGNOSIS — R20.0 NUMBNESS AND TINGLING: Primary | ICD-10-CM

## 2024-01-03 PROCEDURE — 1125F AMNT PAIN NOTED PAIN PRSNT: CPT | Mod: CPTII,S$GLB,, | Performed by: ORTHOPAEDIC SURGERY

## 2024-01-03 PROCEDURE — 3072F LOW RISK FOR RETINOPATHY: CPT | Mod: CPTII,S$GLB,, | Performed by: ORTHOPAEDIC SURGERY

## 2024-01-03 PROCEDURE — 3008F BODY MASS INDEX DOCD: CPT | Mod: CPTII,S$GLB,, | Performed by: ORTHOPAEDIC SURGERY

## 2024-01-03 PROCEDURE — 99213 OFFICE O/P EST LOW 20 MIN: CPT | Mod: 25,S$GLB,, | Performed by: ORTHOPAEDIC SURGERY

## 2024-01-03 PROCEDURE — 1159F MED LIST DOCD IN RCRD: CPT | Mod: CPTII,S$GLB,, | Performed by: ORTHOPAEDIC SURGERY

## 2024-01-03 PROCEDURE — 1101F PT FALLS ASSESS-DOCD LE1/YR: CPT | Mod: CPTII,S$GLB,, | Performed by: ORTHOPAEDIC SURGERY

## 2024-01-03 PROCEDURE — 3288F FALL RISK ASSESSMENT DOCD: CPT | Mod: CPTII,S$GLB,, | Performed by: ORTHOPAEDIC SURGERY

## 2024-01-03 PROCEDURE — 1160F RVW MEDS BY RX/DR IN RCRD: CPT | Mod: CPTII,S$GLB,, | Performed by: ORTHOPAEDIC SURGERY

## 2024-01-03 PROCEDURE — 99999 PR PBB SHADOW E&M-EST. PATIENT-LVL III: CPT | Mod: PBBFAC,,, | Performed by: ORTHOPAEDIC SURGERY

## 2024-01-03 PROCEDURE — 20526 THER INJECTION CARP TUNNEL: CPT | Mod: RT,S$GLB,, | Performed by: ORTHOPAEDIC SURGERY

## 2024-01-03 RX ORDER — TRIAMCINOLONE ACETONIDE 40 MG/ML
40 INJECTION, SUSPENSION INTRA-ARTICULAR; INTRAMUSCULAR
Status: DISCONTINUED | OUTPATIENT
Start: 2024-01-03 | End: 2024-01-03 | Stop reason: HOSPADM

## 2024-01-03 RX ORDER — HYDROCODONE BITARTRATE AND ACETAMINOPHEN 5; 325 MG/1; MG/1
1 TABLET ORAL EVERY 6 HOURS PRN
Qty: 15 TABLET | Refills: 0 | Status: SHIPPED | OUTPATIENT
Start: 2024-01-03 | End: 2024-02-01

## 2024-01-03 RX ADMIN — TRIAMCINOLONE ACETONIDE 40 MG: 40 INJECTION, SUSPENSION INTRA-ARTICULAR; INTRAMUSCULAR at 02:01

## 2024-01-03 NOTE — PROGRESS NOTES
Subjective:     Patient ID: Cassandra Campos is a 73 y.o. female.    Chief Complaint: No chief complaint on file.      HPI:  The patient is a 73-year-old female who had bilateral carpal tunnel surgery many years ago.  She was last injected for carpal tunnel 2021.  Her right is more symptomatic than the left.  She request injection right carpal tunnel.  She does not have wrist splints.  She has not had recent nerve conduction studies    Past Medical History:   Diagnosis Date    Arthritis     hands    Bilateral bunions     Borderline glaucoma     De Quervain's disease (radial styloid tenosynovitis)     Gastritis     upper GI 2017    Hydradenitis     Hyperlipidemia     Hypertension     Insomnia     Migraines 2000    Nasal septum perforation     Obesity     Pneumonia     Restrictive airway disease     Sleep apnea     SVT (supraventricular tachycardia) 2013    Trigger finger     Type 2 diabetes mellitus      am 08/10/2021     Past Surgical History:   Procedure Laterality Date    AXILLARY HIDRADENITIS EXCISION Bilateral     BONE EXOSTOSIS EXCISION Right 2018    Procedure: EXCISION, EXOSTOSIS;  Surgeon: Jayro Pedraza Sr., MD;  Location: [facility];  Service: Orthopedics;  Laterality: Right;    BREAST BIOPSY Bilateral     both benign    BREAST SURGERY  1998    CARPAL TUNNEL RELEASE      bilateral    CATARACT EXTRACTION Bilateral     OU     SECTION  1979    CHOLECYSTECTOMY  2014    COLONOSCOPY N/A 10/02/2020    Procedure: COLONOSCOPY;  Surgeon: Tushar Edwards MD;  Location: [facility];  Service: Endoscopy;  Laterality: N/A;    COLONOSCOPY W/ POLYPECTOMY  10/02/2020    Polyps x3, repeat 5 years; Tushar Edwards MD     CYST REMOVAL  2015    sebaceous cyst removed from face    DE QUERVAIN'S RELEASE Left 2020    Procedure: RELEASE, HAND, FOR DEQUERVAIN'S TENOSYNOVITIS;  Surgeon: Jaime Oswald MD;  Location: [facility];  Service: Orthopedics;  Laterality: Left;    DE  QUERVAIN'S RELEASE Right 2020    Procedure: RELEASE, HAND, FOR DEQUERVAIN'S TENOSYNOVITIS;  Surgeon: Jaime Oswald MD;  Location: [facility] OR;  Service: Orthopedics;  Laterality: Right;    EYE SURGERY      gastric sleeve  2017    Dr. Watson    KNEE SURGERY Right     OLECRANON BURSECTOMY Right 2018    Procedure: BURSECTOMY, OLECRANON;  Surgeon: Jayro Pedraza Sr., MD;  Location: [facility] OR;  Service: Orthopedics;  Laterality: Right;    SURGICAL REMOVAL OF BUNION WITH OSTEOTOMY OF METATARSAL BONE Left 05/10/2019    Procedure: BUNIONECTOMY, WITH METATARSAL OSTEOTOMY;  Surgeon: Srinivasan Villanueva DPM;  Location: [facility] OR;  Service: Podiatry;  Laterality: Left;    SURGICAL REMOVAL OF BUNION WITH OSTEOTOMY OF METATARSAL BONE Right 2019    Procedure: BUNIONECTOMY, WITH METATARSAL OSTEOTOMY;  Surgeon: Srinivasan Villanueva DPM;  Location: [facility] OR;  Service: Podiatry;  Laterality: Right;    TONSILLECTOMY, ADENOIDECTOMY  1980s    TRIGGER FINGER RELEASE Right 2015    Dr. Pedraza     Family History   Problem Relation Age of Onset    Prostate cancer Brother     Diabetes Maternal Aunt     Diabetes Cousin     Hypertension Maternal Grandmother      Social History     Socioeconomic History    Marital status:     Number of children: 1   Occupational History    Occupation:  aid   Tobacco Use    Smoking status: Former     Current packs/day: 0.00     Average packs/day: 0.5 packs/day for 42.0 years (21.0 ttl pk-yrs)     Types: Cigarettes     Start date: 1970     Quit date: 2012     Years since quittin.0     Passive exposure: Past    Smokeless tobacco: Never   Substance and Sexual Activity    Alcohol use: Not Currently     Alcohol/week: 1.0 standard drink of alcohol     Types: 1 Glasses of wine per week     Comment: Glass red wine once a every 2 weeks    Drug use: Never    Sexual activity: Not Currently     Partners: Female     Birth control/protection: Abstinence, None   Social  History Narrative    Single, part-time teacher. Masters degree biology.      Social Determinants of Health     Financial Resource Strain: Low Risk  (12/19/2023)    Overall Financial Resource Strain (CARDIA)     Difficulty of Paying Living Expenses: Not hard at all   Food Insecurity: Food Insecurity Present (12/19/2023)    Hunger Vital Sign     Worried About Running Out of Food in the Last Year: Never true     Ran Out of Food in the Last Year: Sometimes true   Transportation Needs: No Transportation Needs (12/19/2023)    PRAPARE - Transportation     Lack of Transportation (Medical): No     Lack of Transportation (Non-Medical): No   Physical Activity: Insufficiently Active (12/19/2023)    Exercise Vital Sign     Days of Exercise per Week: 3 days     Minutes of Exercise per Session: 20 min   Stress: No Stress Concern Present (12/19/2023)    [place] [place] [employer] - Occupational Stress Questionnaire     Feeling of Stress : Not at all   Social Connections: Unknown (12/19/2023)    Social Connection and Isolation Panel [NHANES]     Frequency of Communication with Friends and Family: Never     Frequency of Social Gatherings with Friends and Family: More than three times a week     Active Member of Clubs or Organizations: Patient declined     Attends Club or Organization Meetings: 1 to 4 times per year     Marital Status:    Housing Stability: Low Risk  (12/19/2023)    Housing Stability Vital Sign     Unable to Pay for Housing in the Last Year: No     Number of Places Lived in the Last Year: 1     Unstable Housing in the Last Year: No     Medication List with Changes/Refills   Current Medications    ACCU-CHEK GUIDE TEST STRIPS STRP    USE TO TEST TWICE A DAY    ALBUTEROL (PROVENTIL/VENTOLIN HFA) 90 MCG/ACTUATION INHALER    INHALE 2 PUFFS INTO THE LUNGS EVERY 4 HOURS AS NEEDED FOR WHEEZING OR SHORTNESS OF BREATH.    AMITRIPTYLINE (ELAVIL) 150 MG TAB    Take 1 tablet (150 mg total) by mouth every  evening.    BLOOD GLUCOSE CONTROL, HIGH (TRUE METRIX LEVEL 3) SOLN    1 each by Other route once daily.    BLOOD-GLUCOSE METER MISC    1 each by Misc.(Non-Drug; Combo Route) route 2 (two) times a day.    CAMPHOR-METHYL SALICYL-MENTHOL 3.1-10-6 % PTMD    Apply 1 patch topically.    CHOLECALCIFEROL, VITAMIN D3, (VITAMIN D3) 25 MCG (1,000 UNIT) CAPSULE    Take 1,000 Units by mouth once daily.    ESZOPICLONE (LUNESTA) 3 MG TAB    Take 1 tablet (3 mg total) by mouth every evening.    LANCING DEVICE WITH LANCETS (ACCU-CHEK SOFT DEV LANCETS) KIT    1 each by Misc.(Non-Drug; Combo Route) route 2 (two) times a day.    LORAZEPAM (ATIVAN) 1 MG TABLET    Take 1 tablet (1 mg total) by mouth 2 (two) times daily.    LOSARTAN (COZAAR) 25 MG TABLET    TAKE 1 TABLET BY MOUTH ONCE  DAILY    MELOXICAM (MOBIC) 15 MG TABLET    Take 1 tablet (15 mg total) by mouth once daily.    METOPROLOL SUCCINATE (TOPROL-XL) 50 MG 24 HR TABLET    TAKE 1 TABLET BY MOUTH ONCE  DAILY    OMEPRAZOLE (PRILOSEC) 20 MG CAPSULE    TAKE 1 CAPSULE BY MOUTH ONCE  DAILY    PRAVASTATIN (PRAVACHOL) 40 MG TABLET    Take 1 tablet by mouth every morning.    SEMAGLUTIDE (OZEMPIC) 2 MG/DOSE (8 MG/3 ML) PNIJ    Inject 2 mg into the skin every 7 days.    TIZANIDINE (ZANAFLEX) 2 MG TABLET    Take 1 tablet (2 mg total) by mouth 2 (two) times a day.    TRAMADOL (ULTRAM) 50 MG TABLET    Take 1 tablet (50 mg total) by mouth every 6 (six) hours as needed.    TRAZODONE (DESYREL) 100 MG TABLET    Take 100 mg by mouth.    TRUEPLUS LANCETS 33 GAUGE MISC    TEST BLOOD SUGAR ONE TIME DAILY     Review of patient's allergies indicates:  No Known Allergies  Review of Systems   Constitutional: Negative for malaise/fatigue.   HENT:  Negative for hearing loss.    Eyes:  Negative for double vision and visual disturbance.   Cardiovascular:  Positive for irregular heartbeat. Negative for chest pain.   Respiratory:  Positive for wheezing. Negative for shortness of breath.    Endocrine: Negative  for cold intolerance.   Hematologic/Lymphatic: Does not bruise/bleed easily.   Skin:  Negative for poor wound healing and suspicious lesions.   Musculoskeletal:  Positive for arthritis, joint pain and joint swelling. Negative for gout.   Gastrointestinal:  Positive for heartburn. Negative for nausea and vomiting.   Genitourinary:  Negative for dysuria.   Neurological:  Positive for numbness, paresthesias and sensory change.   Psychiatric/Behavioral:  Positive for substance abuse. Negative for depression and memory loss. The patient has insomnia and is nervous/anxious.    Allergic/Immunologic: Negative for persistent infections.       Objective:   There is no height or weight on file to calculate BMI.  There were no vitals filed for this visit.             General    Constitutional: She is oriented to person, place, and time. She appears well-developed and well-nourished. No distress.   HENT:   Head: Normocephalic.   Eyes: EOM are normal.   Pulmonary/Chest: Effort normal.   Neurological: She is oriented to person, place, and time.   Psychiatric: She has a normal mood and affect.             Right Hand/Wrist Exam     Inspection   Scars: Wrist - present Hand -  present  Effusion: Wrist - absent Hand -  absent    Pain   Wrist - The patient exhibits pain of the flexor/pronator group.    Tests   Phalens sign: positive  Tinel's sign (median nerve): positive  Carpal Tunnel Compression Test: positive    Atrophy   Thenar:  negative  Intrinsic:  negative    Other     Neuorologic Exam    Median Distribution: abnormal  Ulnar Distribution: normal  Radial Distribution: normal    Comments:  The patient has a positive Tinel and positive Phalen sign.  There is no thenar atrophy noted.  She has a well-healed carpal tunnel scar      Left Hand/Wrist Exam     Inspection   Scars: Wrist - present Hand -  present  Effusion: Wrist - absent Hand -  absent    Pain   Wrist - The patient exhibits pain of the flexor/pronator group.    Tests    Phalens sign: positive  Tinel's sign (median nerve): positive  Carpal Tunnel Compression Test: positive    Atrophy  Thenar:  Negative  Intrinsic: negative    Other     Sensory Exam  Median Distribution: abnormal  Ulnar Distribution: normal  Radial Distribution: normal    Comments:  There is a well-healed carpal tunnel scar.  The patient has a positive Tinel and positive Phalen sign.  There is no thenar atrophy noted.          Vascular Exam       Capillary Refill  Right Hand: normal capillary refill  Left Hand: normal capillary refill           radiographs were not obtained today  Assessment:     Recurrent bilateral carpal tunnel syndrome right greater than left     Plan:       The patient is sent for repeat nerve conduction studies both upper extremities.  She was injected in the right carpal tunnel with 1 cc of Kenalog and 1 cc of 2% plain lidocaine.  She was given bilateral wrist splints.  She was given Norco 5 mg number 15 one p.o. q.i.d. p.r.n. pain              Disclaimer: This note was prepared using a voice recognition system and is likely to have sound alike errors within the text.

## 2024-01-03 NOTE — PROCEDURES
Carpal Tunnel    Date/Time: 1/3/2024 2:00 PM    Performed by: Jaime Oswald MD  Authorized by: Jaime Oswald MD    Consent Done?:  Yes (Verbal)  Indications:  Pain  Timeout: prior to procedure the correct patient, procedure, and site was verified    Prep: patient was prepped and draped in usual sterile fashion      Local anesthesia used?: Yes    Local anesthetic:  Lidocaine 2% without epinephrine  Anesthetic total (ml):  1    Location:  Wrist  Site:  R carpal tunnel  Ultrasonic Guidance for Needle Placement?: No    Needle size:  25 G  Approach:  Volar  Medications:  40 mg triamcinolone acetonide 40 mg/mL

## 2024-01-17 ENCOUNTER — OFFICE VISIT (OUTPATIENT)
Dept: ORTHOPEDICS | Facility: CLINIC | Age: 74
End: 2024-01-17
Payer: MEDICARE

## 2024-01-17 ENCOUNTER — OFFICE VISIT (OUTPATIENT)
Dept: PHYSICAL MEDICINE AND REHAB | Facility: CLINIC | Age: 74
End: 2024-01-17
Payer: MEDICARE

## 2024-01-17 VITALS — BODY MASS INDEX: 31.89 KG/M2 | HEIGHT: 63 IN | WEIGHT: 180 LBS

## 2024-01-17 VITALS
BODY MASS INDEX: 31.89 KG/M2 | DIASTOLIC BLOOD PRESSURE: 84 MMHG | RESPIRATION RATE: 13 BRPM | SYSTOLIC BLOOD PRESSURE: 122 MMHG | WEIGHT: 180 LBS | HEART RATE: 96 BPM | HEIGHT: 63 IN

## 2024-01-17 DIAGNOSIS — G56.03 CARPAL TUNNEL SYNDROME ON BOTH SIDES: Primary | ICD-10-CM

## 2024-01-17 DIAGNOSIS — Z01.818 PREOPERATIVE EXAMINATION: ICD-10-CM

## 2024-01-17 DIAGNOSIS — G56.03 BILATERAL CARPAL TUNNEL SYNDROME: ICD-10-CM

## 2024-01-17 PROCEDURE — 3288F FALL RISK ASSESSMENT DOCD: CPT | Mod: CPTII,S$GLB,, | Performed by: ORTHOPAEDIC SURGERY

## 2024-01-17 PROCEDURE — 1160F RVW MEDS BY RX/DR IN RCRD: CPT | Mod: CPTII,S$GLB,, | Performed by: ORTHOPAEDIC SURGERY

## 2024-01-17 PROCEDURE — 99999 PR PBB SHADOW E&M-EST. PATIENT-LVL III: CPT | Mod: PBBFAC,,, | Performed by: PHYSICAL MEDICINE & REHABILITATION

## 2024-01-17 PROCEDURE — 1125F AMNT PAIN NOTED PAIN PRSNT: CPT | Mod: CPTII,S$GLB,, | Performed by: ORTHOPAEDIC SURGERY

## 2024-01-17 PROCEDURE — 99499 UNLISTED E&M SERVICE: CPT | Mod: S$GLB,,, | Performed by: PHYSICAL MEDICINE & REHABILITATION

## 2024-01-17 PROCEDURE — 3008F BODY MASS INDEX DOCD: CPT | Mod: CPTII,S$GLB,, | Performed by: ORTHOPAEDIC SURGERY

## 2024-01-17 PROCEDURE — 99214 OFFICE O/P EST MOD 30 MIN: CPT | Mod: S$GLB,,, | Performed by: ORTHOPAEDIC SURGERY

## 2024-01-17 PROCEDURE — 3072F LOW RISK FOR RETINOPATHY: CPT | Mod: CPTII,S$GLB,, | Performed by: ORTHOPAEDIC SURGERY

## 2024-01-17 PROCEDURE — 99999 PR PBB SHADOW E&M-EST. PATIENT-LVL III: CPT | Mod: PBBFAC,,, | Performed by: ORTHOPAEDIC SURGERY

## 2024-01-17 PROCEDURE — 1101F PT FALLS ASSESS-DOCD LE1/YR: CPT | Mod: CPTII,S$GLB,, | Performed by: ORTHOPAEDIC SURGERY

## 2024-01-17 PROCEDURE — 95911 NRV CNDJ TEST 9-10 STUDIES: CPT | Mod: S$GLB,,, | Performed by: PHYSICAL MEDICINE & REHABILITATION

## 2024-01-17 PROCEDURE — 1159F MED LIST DOCD IN RCRD: CPT | Mod: CPTII,S$GLB,, | Performed by: ORTHOPAEDIC SURGERY

## 2024-01-17 NOTE — PROGRESS NOTES
[facility]   [id number]  [place] [address]  Phone: [phone]        Full Name: Cassandra Campos YOB: 1950  Patient ID: [id number]      Visit Date: 1/17/2024 12:54  Age: 73 Years 5 Months Old  Examining Physician: Kristen Nichole M.D.  Referring Physician: Dr Oswald  Reason for Referral: upper ext      Chief Complaint   Patient presents with    Hand Pain       HPI: This is a 73 y.o.  female being seen in clinic today for evaluation of chronic hand numbness/tingling-worse on the right.  She has a history of CTR years ago and CTS around 2021.  Her hands bother her with rest and activity.  Bracing and position change provide minimal relief.     History obtained from patient    Past family, medical, social, and surgical history reviewed in chart    Review of Systems:     General- denies lethargy, weight change, fever, chills  Head/neck- denies swallowing difficulties  ENT- denies hearing changes  Cardiovascular-denies chest pain  Pulmonary- denies shortness of breath  GI- denies constipation or bowel incontinence  - denies bladder incontinence  Skin- denies wounds or rashes  Musculoskeletal- denies weakness, + pain  Neurologic- + numbness and tingling  Psychiatric- denies depressive or psychotic features, + anxiety  Lymphatic-denies swelling  Endocrine- denies hypoglycemic symptoms/+DM history  All other pertinent systems negative     Physical Examination:  General: Well developed, well nourished female, NAD  HEENT:NCAT EOMI bilaterally   Pulmonary:Normal respirations    Spinal Examination: CERVICAL  Active ROM is within normal limits.  Inspection: No deformity of spinal alignment.    Musculoskeletal Tests:  Phalen:   Elbow compression (ulnar): neg  Tinels at wrist: +bilaterally    Bilateral Upper and Lower Extremities:  Pulses are 2+ at radial bilaterally.  Shoulder/Elbow/Wrist/Hand ROM wnl  Hip/Knee/Ankle ROM   Bilateral Extremities show normal capillary refill.  No signs of  cyanosis, rubor, edema, skin changes, or dysvascular changes of appendages.  Nails appear intact.    Neurological Exam:  Cranial Nerves:  II-XII grossly intact    Manual Muscle Testing: (Motor 5=normal)  5/5 strength bilateral upper extremities    No focal atrophy is noted of either upper extremity.    Bilateral Reflexes:  Wilkins's response is absent bilaterally.    Sensation: tested to light touch  - intact in arms except dec at right 1st-4th digits  Gait: Narrow base and good arm swing.      Entire procedure explained to patient prior to proceeding.  Verbal consent obtained        SNC      Nerve / Sites Rec. Site Onset Lat Peak Lat Amp Segments Distance Velocity     ms ms µV  mm m/s   R Median - Digit II (Antidromic)      Wrist Dig II 4.4 5.7 10.0 Wrist - Dig  32   L Median - Digit II (Antidromic)      Wrist Dig II 3.8 4.7 19.7 Wrist - Dig  37   R Ulnar - Digit V (Antidromic)      Wrist Dig V 2.4 3.1 21.0 Wrist - Dig V 140 58   L Ulnar - Digit V (Antidromic)      Wrist Dig V 2.7 3.5 11.7 Wrist - Dig V 140 53   R Radial - Anatomical snuff box (Forearm)      Forearm Wrist 1.7 2.3 16.0 Forearm - Wrist 100 58   L Radial - Anatomical snuff box (Forearm)      Forearm Wrist 1.8 2.3 18.0 Forearm - Wrist 100 56       MNC      Nerve / Sites Muscle Latency Amplitude Duration Rel Amp Segments Distance Lat Diff Velocity     ms mV ms %  mm ms m/s   R Median - APB      Wrist APB 4.9 6.8 7.8 100 Wrist - APB 80        Elbow APB 8.8 5.7 8.1 83.5 Elbow - Wrist 210 3.9 54   L Median - APB      Wrist APB 4.1 7.1 7.2 100 Wrist - APB 80        Elbow APB 8.1 6.4 7.5 90.1 Elbow - Wrist 210 4.0 53   R Ulnar - ADM      Wrist ADM 2.7 9.2 5.4 100 Wrist -         B. Elbow ADM 6.5 7.5 5.6 81.5 B. Elbow - Wrist 230 3.8 60      A. Elbow ADM 8.2 7.9 5.8 105 A. Elbow - B. Elbow 90 1.7 52   L Ulnar - ADM      Wrist ADM 3.2 6.1 5.7 100 Wrist -         B. Elbow ADM 6.9 6.2 6.1 102 B. Elbow - Wrist 230 3.6 63      A. Elbow  ADM 8.4 6.0 7.0 97 A. Elbow - B. Elbow 95 1.5 63                                          INTERPRETATION  -Bilateral median motor nerve conduction study showed prolonged latency on the right, normal amplitude, and conduction velocity  -Bilateral median sensory nerve conduction study showed prolonged peak latency and normal amplitude  -Bilateral ulnar motor nerve conduction study showed normal latency, amplitude, and conduction velocity  -Bilateral ulnar sensory nerve conduction study showed normal peak latency and amplitude  -Bilateral radial sensory nerve conduction study showed normal peak latency and amplitude      IMPRESSION  ABNORMAL study  2. 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 Follow up with referring provider: Dr. Jaime Moreno*  2. Handouts on CTS provided  3. This study is good for one year. If symptoms worsen or do not improve, please re-consult.    Kristen Nichole M.D.  Physical Medicine and Rehab

## 2024-01-17 NOTE — PROGRESS NOTES
Subjective:     Patient ID: Cassandra Campos is a 73 y.o. female.    Chief Complaint: No chief complaint on file.      HPI:  The patient is a 73-year-old female who had bilateral carpal tunnel syndrome many years ago.  She had recurrence about 3 years ago.  She wishes to have a right carpal tunnel release.  She had nerve conduction studies today that did confirm bilateral carpal tunnel syndrome.  She has tried splints and injections with recurrence    Past Medical History:   Diagnosis Date    Arthritis     hands    Bilateral bunions     Borderline glaucoma     De Quervain's disease (radial styloid tenosynovitis)     Gastritis     upper GI 2017    Hydradenitis     Hyperlipidemia     Hypertension     Insomnia     Migraines 2000    Nasal septum perforation     Obesity     Pneumonia     Restrictive airway disease     Sleep apnea     SVT (supraventricular tachycardia) 2013    Trigger finger     Type 2 diabetes mellitus      am 08/10/2021     Past Surgical History:   Procedure Laterality Date    AXILLARY HIDRADENITIS EXCISION Bilateral     BONE EXOSTOSIS EXCISION Right 2018    Procedure: EXCISION, EXOSTOSIS;  Surgeon: Jayro Pedraza Sr., MD;  Location: [facility];  Service: Orthopedics;  Laterality: Right;    BREAST BIOPSY Bilateral     both benign    BREAST SURGERY  1998    CARPAL TUNNEL RELEASE      bilateral    CATARACT EXTRACTION Bilateral     OU     SECTION  1979    CHOLECYSTECTOMY  2014    COLONOSCOPY N/A 10/02/2020    Procedure: COLONOSCOPY;  Surgeon: Tushar Edwards MD;  Location: [facility];  Service: Endoscopy;  Laterality: N/A;    COLONOSCOPY W/ POLYPECTOMY  10/02/2020    Polyps x3, repeat 5 years; Tushar Edwards MD     CYST REMOVAL  2015    sebaceous cyst removed from face    DE QUERVAIN'S RELEASE Left 2020    Procedure: RELEASE, HAND, FOR DEQUERVAIN'S TENOSYNOVITIS;  Surgeon: Jaime Oswald MD;  Location: [facility];  Service: Orthopedics;  Laterality:  Left;    DE QUERVAIN'S RELEASE Right 2020    Procedure: RELEASE, HAND, FOR DEQUERVAIN'S TENOSYNOVITIS;  Surgeon: Jaime Oswald MD;  Location: [facility];  Service: Orthopedics;  Laterality: Right;    EYE SURGERY      gastric sleeve  2017    Dr. Watson    KNEE SURGERY Right     OLECRANON BURSECTOMY Right 2018    Procedure: BURSECTOMY, OLECRANON;  Surgeon: Jayro Pedraza Sr., MD;  Location: [facility];  Service: Orthopedics;  Laterality: Right;    SURGICAL REMOVAL OF BUNION WITH OSTEOTOMY OF METATARSAL BONE Left 05/10/2019    Procedure: BUNIONECTOMY, WITH METATARSAL OSTEOTOMY;  Surgeon: Srinivasan Villanueva DPM;  Location: [facility];  Service: Podiatry;  Laterality: Left;    SURGICAL REMOVAL OF BUNION WITH OSTEOTOMY OF METATARSAL BONE Right 2019    Procedure: BUNIONECTOMY, WITH METATARSAL OSTEOTOMY;  Surgeon: Srinivasan Villanueva DPM;  Location: [facility];  Service: Podiatry;  Laterality: Right;    TONSILLECTOMY, ADENOIDECTOMY  1980s    TRIGGER FINGER RELEASE Right 2015    Dr. Pedraza     Family History   Problem Relation Age of Onset    Prostate cancer Brother     Diabetes Maternal Aunt     Diabetes Cousin     Hypertension Maternal Grandmother      Social History     Socioeconomic History    Marital status:     Number of children: 1   Occupational History    Occupation:  aid   Tobacco Use    Smoking status: Former     Current packs/day: 0.00     Average packs/day: 0.5 packs/day for 42.0 years (21.0 ttl pk-yrs)     Types: Cigarettes     Start date: 1970     Quit date: 2012     Years since quittin.0     Passive exposure: Past    Smokeless tobacco: Never   Substance and Sexual Activity    Alcohol use: Not Currently     Alcohol/week: 1.0 standard drink of alcohol     Types: 1 Glasses of wine per week     Comment: Glass red wine once a every 2 weeks    Drug use: Never    Sexual activity: Not Currently     Partners: Female     Birth control/protection: Abstinence, None    Social History Narrative    Single, part-time teacher. Masters degree biology.      Social Determinants of Health     Financial Resource Strain: Low Risk  (12/19/2023)    Overall Financial Resource Strain (CARDIA)     Difficulty of Paying Living Expenses: Not hard at all   Food Insecurity: Food Insecurity Present (12/19/2023)    Hunger Vital Sign     Worried About Running Out of Food in the Last Year: Never true     Ran Out of Food in the Last Year: Sometimes true   Transportation Needs: No Transportation Needs (12/19/2023)    PRAPARE - Transportation     Lack of Transportation (Medical): No     Lack of Transportation (Non-Medical): No   Physical Activity: Insufficiently Active (12/19/2023)    Exercise Vital Sign     Days of Exercise per Week: 3 days     Minutes of Exercise per Session: 20 min   Stress: No Stress Concern Present (12/19/2023)    [employer] [place] [employer] - Occupational Stress Questionnaire     Feeling of Stress : Not at all   Social Connections: Unknown (12/19/2023)    Social Connection and Isolation Panel [NHANES]     Frequency of Communication with Friends and Family: Never     Frequency of Social Gatherings with Friends and Family: More than three times a week     Active Member of Clubs or Organizations: Patient declined     Attends Club or Organization Meetings: 1 to 4 times per year     Marital Status:    Housing Stability: Low Risk  (12/19/2023)    Housing Stability Vital Sign     Unable to Pay for Housing in the Last Year: No     Number of Places Lived in the Last Year: 1     Unstable Housing in the Last Year: No     Medication List with Changes/Refills   Current Medications    ACCU-CHEK GUIDE TEST STRIPS STRP    USE TO TEST TWICE A DAY    ALBUTEROL (PROVENTIL/VENTOLIN HFA) 90 MCG/ACTUATION INHALER    INHALE 2 PUFFS INTO THE LUNGS EVERY 4 HOURS AS NEEDED FOR WHEEZING OR SHORTNESS OF BREATH.    AMITRIPTYLINE (ELAVIL) 150 MG TAB    Take 1 tablet (150 mg total) by mouth  every evening.    BLOOD GLUCOSE CONTROL, HIGH (TRUE METRIX LEVEL 3) SOLN    1 each by Other route once daily.    BLOOD-GLUCOSE METER MISC    1 each by Misc.(Non-Drug; Combo Route) route 2 (two) times a day.    CAMPHOR-METHYL SALICYL-MENTHOL 3.1-10-6 % PTMD    Apply 1 patch topically.    CHOLECALCIFEROL, VITAMIN D3, (VITAMIN D3) 25 MCG (1,000 UNIT) CAPSULE    Take 1,000 Units by mouth once daily.    ESZOPICLONE (LUNESTA) 3 MG TAB    Take 1 tablet (3 mg total) by mouth every evening.    HYDROCODONE-ACETAMINOPHEN (NORCO) 5-325 MG PER TABLET    Take 1 tablet by mouth every 6 (six) hours as needed for Pain.    LANCING DEVICE WITH LANCETS (ACCU-CHEK SOFT DEV LANCETS) KIT    1 each by Misc.(Non-Drug; Combo Route) route 2 (two) times a day.    LORAZEPAM (ATIVAN) 1 MG TABLET    Take 1 tablet (1 mg total) by mouth 2 (two) times daily.    LOSARTAN (COZAAR) 25 MG TABLET    TAKE 1 TABLET BY MOUTH ONCE  DAILY    MELOXICAM (MOBIC) 15 MG TABLET    Take 1 tablet (15 mg total) by mouth once daily.    METOPROLOL SUCCINATE (TOPROL-XL) 50 MG 24 HR TABLET    TAKE 1 TABLET BY MOUTH ONCE  DAILY    OMEPRAZOLE (PRILOSEC) 20 MG CAPSULE    TAKE 1 CAPSULE BY MOUTH ONCE  DAILY    PRAVASTATIN (PRAVACHOL) 40 MG TABLET    Take 1 tablet by mouth every morning.    SEMAGLUTIDE (OZEMPIC) 2 MG/DOSE (8 MG/3 ML) PNIJ    Inject 2 mg into the skin every 7 days.    TIZANIDINE (ZANAFLEX) 2 MG TABLET    Take 1 tablet (2 mg total) by mouth 2 (two) times a day.    TRAMADOL (ULTRAM) 50 MG TABLET    Take 1 tablet (50 mg total) by mouth every 6 (six) hours as needed.    TRAZODONE (DESYREL) 100 MG TABLET    Take 100 mg by mouth.    TRUEPLUS LANCETS 33 GAUGE MISC    TEST BLOOD SUGAR ONE TIME DAILY     Review of patient's allergies indicates:  No Known Allergies  Review of Systems   Constitutional: Negative for malaise/fatigue.   HENT:  Negative for hearing loss.    Eyes:  Negative for double vision and visual disturbance.   Cardiovascular:  Positive for irregular  heartbeat. Negative for chest pain.   Respiratory:  Negative for shortness of breath.    Endocrine: Negative for cold intolerance.   Hematologic/Lymphatic: Does not bruise/bleed easily.   Skin:  Negative for poor wound healing and suspicious lesions.   Musculoskeletal:  Positive for arthritis, joint pain and joint swelling. Negative for gout.   Gastrointestinal:  Positive for heartburn. Negative for nausea and vomiting.   Genitourinary:  Negative for dysuria.   Neurological:  Positive for numbness, paresthesias and sensory change.   Psychiatric/Behavioral:  Positive for altered mental status and substance abuse. Negative for depression and memory loss. The patient has insomnia and is nervous/anxious.    Allergic/Immunologic: Negative for persistent infections.       Objective:   There is no height or weight on file to calculate BMI.  There were no vitals filed for this visit.             General    Constitutional: She is oriented to person, place, and time. She appears well-developed and well-nourished. No distress.   HENT:   Head: Normocephalic.   Mouth/Throat: Oropharynx is clear and moist.   Eyes: EOM are normal.   Cardiovascular:  Normal rate.            Pulmonary/Chest: Effort normal.   Abdominal: Soft.   Neurological: She is alert and oriented to person, place, and time. No cranial nerve deficit.   Psychiatric: She has a normal mood and affect.             Right Hand/Wrist Exam     Inspection   Scars: Wrist - present Hand -  present  Effusion: Wrist - absent Hand -  absent    Pain   Wrist - The patient exhibits pain of the flexor/pronator group.    Tests   Phalens sign: positive  Tinel's sign (median nerve): positive  Carpal Tunnel Compression Test: positive    Atrophy   Thenar:  negative  Intrinsic:  negative    Other     Neuorologic Exam    Median Distribution: abnormal  Ulnar Distribution: normal  Radial Distribution: normal    Comments:  The patient has well-healed carpal tunnel scar.  There is a positive  Tinel and positive Phalen sign.  There is no thenar atrophy noted.      Left Hand/Wrist Exam     Inspection   Scars: Wrist - present Hand -  present  Effusion: Wrist - absent Hand -  absent    Pain   Wrist - The patient exhibits pain of the flexor/pronator group.    Tests   Phalens sign: positive  Tinel's sign (median nerve): positive  Carpal Tunnel Compression Test: positive    Atrophy  Thenar:  Negative  Intrinsic: negative    Other     Sensory Exam  Median Distribution: abnormal  Ulnar Distribution: normal  Radial Distribution: normal    Comments:  The patient has well-healed carpal tunnel scar.  There is a positive Tinel and positive Phalen sign.  There is no thenar atrophy noted.          Vascular Exam       Capillary Refill  Right Hand: normal capillary refill  Left Hand: normal capillary refill           radiographs were not obtained today  Assessment:     Bilateral carpal tunnel syndrome, recurrent     Plan:     The patient wishes to have a right carpal tunnel release with median nerve epineural neural lysis.  Risk complications and alternatives were discussed including the risk of infection, anesthetic risk, injury to nerves and vessels, loss of motion, and possible need for additional surgeries were discussed.  She seems to understand and agree that surgery.  All questions were answered.                Disclaimer: This note was prepared using a voice recognition system and is likely to have sound alike errors within the text.

## 2024-01-17 NOTE — TELEPHONE ENCOUNTER
No care due was identified.  [facility] Embedded Care Due Messages. Reference number: [id number].   1/17/2024 1:27:09 PM CST

## 2024-01-17 NOTE — H&P (VIEW-ONLY)
Subjective:     Patient ID: Cassandra Campos is a 73 y.o. female.    Chief Complaint: No chief complaint on file.      HPI:  The patient is a 73-year-old female who had bilateral carpal tunnel syndrome many years ago.  She had recurrence about 3 years ago.  She wishes to have a right carpal tunnel release.  She had nerve conduction studies today that did confirm bilateral carpal tunnel syndrome.  She has tried splints and injections with recurrence    Past Medical History:   Diagnosis Date    Arthritis     hands    Bilateral bunions     Borderline glaucoma     De Quervain's disease (radial styloid tenosynovitis)     Gastritis     upper GI 2017    Hydradenitis     Hyperlipidemia     Hypertension     Insomnia     Migraines 2000    Nasal septum perforation     Obesity     Pneumonia     Restrictive airway disease     Sleep apnea     SVT (supraventricular tachycardia) 2013    Trigger finger     Type 2 diabetes mellitus      am 08/10/2021     Past Surgical History:   Procedure Laterality Date    AXILLARY HIDRADENITIS EXCISION Bilateral     BONE EXOSTOSIS EXCISION Right 2018    Procedure: EXCISION, EXOSTOSIS;  Surgeon: Jayro Pedraza Sr., MD;  Location: [facility];  Service: Orthopedics;  Laterality: Right;    BREAST BIOPSY Bilateral     both benign    BREAST SURGERY  1998    CARPAL TUNNEL RELEASE      bilateral    CATARACT EXTRACTION Bilateral     OU     SECTION  1979    CHOLECYSTECTOMY  2014    COLONOSCOPY N/A 10/02/2020    Procedure: COLONOSCOPY;  Surgeon: Tushar Edwards MD;  Location: [facility];  Service: Endoscopy;  Laterality: N/A;    COLONOSCOPY W/ POLYPECTOMY  10/02/2020    Polyps x3, repeat 5 years; Tushar Edwards MD     CYST REMOVAL  2015    sebaceous cyst removed from face    DE QUERVAIN'S RELEASE Left 2020    Procedure: RELEASE, HAND, FOR DEQUERVAIN'S TENOSYNOVITIS;  Surgeon: Jaime Oswald MD;  Location: [facility];  Service: Orthopedics;  Laterality:  Left;    DE QUERVAIN'S RELEASE Right 2020    Procedure: RELEASE, HAND, FOR DEQUERVAIN'S TENOSYNOVITIS;  Surgeon: Jaime Oswald MD;  Location: [facility];  Service: Orthopedics;  Laterality: Right;    EYE SURGERY      gastric sleeve  2017    Dr. Watson    KNEE SURGERY Right     OLECRANON BURSECTOMY Right 2018    Procedure: BURSECTOMY, OLECRANON;  Surgeon: Jayro Pedraza Sr., MD;  Location: [facility] OR;  Service: Orthopedics;  Laterality: Right;    SURGICAL REMOVAL OF BUNION WITH OSTEOTOMY OF METATARSAL BONE Left 05/10/2019    Procedure: BUNIONECTOMY, WITH METATARSAL OSTEOTOMY;  Surgeon: Srinivasan Villanueva DPM;  Location: [facility];  Service: Podiatry;  Laterality: Left;    SURGICAL REMOVAL OF BUNION WITH OSTEOTOMY OF METATARSAL BONE Right 2019    Procedure: BUNIONECTOMY, WITH METATARSAL OSTEOTOMY;  Surgeon: Srinivasan Villanueva DPM;  Location: [facility];  Service: Podiatry;  Laterality: Right;    TONSILLECTOMY, ADENOIDECTOMY  1980s    TRIGGER FINGER RELEASE Right 2015    Dr. Pedraza     Family History   Problem Relation Age of Onset    Prostate cancer Brother     Diabetes Maternal Aunt     Diabetes Cousin     Hypertension Maternal Grandmother      Social History     Socioeconomic History    Marital status:     Number of children: 1   Occupational History    Occupation:  aid   Tobacco Use    Smoking status: Former     Current packs/day: 0.00     Average packs/day: 0.5 packs/day for 42.0 years (21.0 ttl pk-yrs)     Types: Cigarettes     Start date: 1970     Quit date: 2012     Years since quittin.0     Passive exposure: Past    Smokeless tobacco: Never   Substance and Sexual Activity    Alcohol use: Not Currently     Alcohol/week: 1.0 standard drink of alcohol     Types: 1 Glasses of wine per week     Comment: Glass red wine once a every 2 weeks    Drug use: Never    Sexual activity: Not Currently     Partners: Female     Birth control/protection: Abstinence, None    Social History Narrative    Single, part-time teacher. Masters degree biology.      Social Determinants of Health     Financial Resource Strain: Low Risk  (12/19/2023)    Overall Financial Resource Strain (CARDIA)     Difficulty of Paying Living Expenses: Not hard at all   Food Insecurity: Food Insecurity Present (12/19/2023)    Hunger Vital Sign     Worried About Running Out of Food in the Last Year: Never true     Ran Out of Food in the Last Year: Sometimes true   Transportation Needs: No Transportation Needs (12/19/2023)    PRAPARE - Transportation     Lack of Transportation (Medical): No     Lack of Transportation (Non-Medical): No   Physical Activity: Insufficiently Active (12/19/2023)    Exercise Vital Sign     Days of Exercise per Week: 3 days     Minutes of Exercise per Session: 20 min   Stress: No Stress Concern Present (12/19/2023)    [employer] - Occupational Stress Questionnaire     Feeling of Stress : Not at all   Social Connections: Unknown (12/19/2023)    Social Connection and Isolation Panel [NHANES]     Frequency of Communication with Friends and Family: Never     Frequency of Social Gatherings with Friends and Family: More than three times a week     Active Member of Clubs or Organizations: Patient declined     Attends Club or Organization Meetings: 1 to 4 times per year     Marital Status:    Housing Stability: Low Risk  (12/19/2023)    Housing Stability Vital Sign     Unable to Pay for Housing in the Last Year: No     Number of Places Lived in the Last Year: 1     Unstable Housing in the Last Year: No     Medication List with Changes/Refills   Current Medications    ACCU-CHEK GUIDE TEST STRIPS STRP    USE TO TEST TWICE A DAY    ALBUTEROL (PROVENTIL/VENTOLIN HFA) 90 MCG/ACTUATION INHALER    INHALE 2 PUFFS INTO THE LUNGS EVERY 4 HOURS AS NEEDED FOR WHEEZING OR SHORTNESS OF BREATH.    AMITRIPTYLINE (ELAVIL) 150 MG TAB    Take 1 tablet (150 mg total) by mouth  every evening.    BLOOD GLUCOSE CONTROL, HIGH (TRUE METRIX LEVEL 3) SOLN    1 each by Other route once daily.    BLOOD-GLUCOSE METER MISC    1 each by Misc.(Non-Drug; Combo Route) route 2 (two) times a day.    CAMPHOR-METHYL SALICYL-MENTHOL 3.1-10-6 % PTMD    Apply 1 patch topically.    CHOLECALCIFEROL, VITAMIN D3, (VITAMIN D3) 25 MCG (1,000 UNIT) CAPSULE    Take 1,000 Units by mouth once daily.    ESZOPICLONE (LUNESTA) 3 MG TAB    Take 1 tablet (3 mg total) by mouth every evening.    HYDROCODONE-ACETAMINOPHEN (NORCO) 5-325 MG PER TABLET    Take 1 tablet by mouth every 6 (six) hours as needed for Pain.    LANCING DEVICE WITH LANCETS (ACCU-CHEK SOFT DEV LANCETS) KIT    1 each by Misc.(Non-Drug; Combo Route) route 2 (two) times a day.    LORAZEPAM (ATIVAN) 1 MG TABLET    Take 1 tablet (1 mg total) by mouth 2 (two) times daily.    LOSARTAN (COZAAR) 25 MG TABLET    TAKE 1 TABLET BY MOUTH ONCE  DAILY    MELOXICAM (MOBIC) 15 MG TABLET    Take 1 tablet (15 mg total) by mouth once daily.    METOPROLOL SUCCINATE (TOPROL-XL) 50 MG 24 HR TABLET    TAKE 1 TABLET BY MOUTH ONCE  DAILY    OMEPRAZOLE (PRILOSEC) 20 MG CAPSULE    TAKE 1 CAPSULE BY MOUTH ONCE  DAILY    PRAVASTATIN (PRAVACHOL) 40 MG TABLET    Take 1 tablet by mouth every morning.    SEMAGLUTIDE (OZEMPIC) 2 MG/DOSE (8 MG/3 ML) PNIJ    Inject 2 mg into the skin every 7 days.    TIZANIDINE (ZANAFLEX) 2 MG TABLET    Take 1 tablet (2 mg total) by mouth 2 (two) times a day.    TRAMADOL (ULTRAM) 50 MG TABLET    Take 1 tablet (50 mg total) by mouth every 6 (six) hours as needed.    TRAZODONE (DESYREL) 100 MG TABLET    Take 100 mg by mouth.    TRUEPLUS LANCETS 33 GAUGE MISC    TEST BLOOD SUGAR ONE TIME DAILY     Review of patient's allergies indicates:  No Known Allergies  Review of Systems   Constitutional: Negative for malaise/fatigue.   HENT:  Negative for hearing loss.    Eyes:  Negative for double vision and visual disturbance.   Cardiovascular:  Positive for irregular  heartbeat. Negative for chest pain.   Respiratory:  Negative for shortness of breath.    Endocrine: Negative for cold intolerance.   Hematologic/Lymphatic: Does not bruise/bleed easily.   Skin:  Negative for poor wound healing and suspicious lesions.   Musculoskeletal:  Positive for arthritis, joint pain and joint swelling. Negative for gout.   Gastrointestinal:  Positive for heartburn. Negative for nausea and vomiting.   Genitourinary:  Negative for dysuria.   Neurological:  Positive for numbness, paresthesias and sensory change.   Psychiatric/Behavioral:  Positive for altered mental status and substance abuse. Negative for depression and memory loss. The patient has insomnia and is nervous/anxious.    Allergic/Immunologic: Negative for persistent infections.       Objective:   There is no height or weight on file to calculate BMI.  There were no vitals filed for this visit.             General    Constitutional: She is oriented to person, place, and time. She appears well-developed and well-nourished. No distress.   HENT:   Head: Normocephalic.   Mouth/Throat: Oropharynx is clear and moist.   Eyes: EOM are normal.   Cardiovascular:  Normal rate.            Pulmonary/Chest: Effort normal.   Abdominal: Soft.   Neurological: She is alert and oriented to person, place, and time. No cranial nerve deficit.   Psychiatric: She has a normal mood and affect.             Right Hand/Wrist Exam     Inspection   Scars: Wrist - present Hand -  present  Effusion: Wrist - absent Hand -  absent    Pain   Wrist - The patient exhibits pain of the flexor/pronator group.    Tests   Phalens sign: positive  Tinel's sign (median nerve): positive  Carpal Tunnel Compression Test: positive    Atrophy   Thenar:  negative  Intrinsic:  negative    Other     Neuorologic Exam    Median Distribution: abnormal  Ulnar Distribution: normal  Radial Distribution: normal    Comments:  The patient has well-healed carpal tunnel scar.  There is a positive  Tinel and positive Phalen sign.  There is no thenar atrophy noted.      Left Hand/Wrist Exam     Inspection   Scars: Wrist - present Hand -  present  Effusion: Wrist - absent Hand -  absent    Pain   Wrist - The patient exhibits pain of the flexor/pronator group.    Tests   Phalens sign: positive  Tinel's sign (median nerve): positive  Carpal Tunnel Compression Test: positive    Atrophy  Thenar:  Negative  Intrinsic: negative    Other     Sensory Exam  Median Distribution: abnormal  Ulnar Distribution: normal  Radial Distribution: normal    Comments:  The patient has well-healed carpal tunnel scar.  There is a positive Tinel and positive Phalen sign.  There is no thenar atrophy noted.          Vascular Exam       Capillary Refill  Right Hand: normal capillary refill  Left Hand: normal capillary refill           radiographs were not obtained today  Assessment:     Bilateral carpal tunnel syndrome, recurrent     Plan:     The patient wishes to have a right carpal tunnel release with median nerve epineural neural lysis.  Risk complications and alternatives were discussed including the risk of infection, anesthetic risk, injury to nerves and vessels, loss of motion, and possible need for additional surgeries were discussed.  She seems to understand and agree that surgery.  All questions were answered.                Disclaimer: This note was prepared using a voice recognition system and is likely to have sound alike errors within the text.

## 2024-01-18 ENCOUNTER — PATIENT MESSAGE (OUTPATIENT)
Dept: INTERNAL MEDICINE | Facility: CLINIC | Age: 74
End: 2024-01-18
Payer: MEDICARE

## 2024-01-18 RX ORDER — ESZOPICLONE 3 MG/1
3 TABLET, FILM COATED ORAL NIGHTLY
Qty: 30 TABLET | Refills: 2 | Status: SHIPPED | OUTPATIENT
Start: 2024-01-18 | End: 2024-04-10 | Stop reason: SDUPTHER

## 2024-01-18 RX ORDER — AMITRIPTYLINE HYDROCHLORIDE 150 MG/1
150 TABLET ORAL NIGHTLY
Qty: 90 TABLET | Refills: 1 | Status: SHIPPED | OUTPATIENT
Start: 2024-01-18 | End: 2024-02-01

## 2024-01-18 NOTE — TELEPHONE ENCOUNTER
No care due was identified.  [facility] Embedded Care Due Messages. Reference number: [id number].   1/18/2024 2:27:37 PM CST

## 2024-01-20 RX ORDER — LORAZEPAM 1 MG/1
1 TABLET ORAL 2 TIMES DAILY
Qty: 60 TABLET | Refills: 2 | Status: SHIPPED | OUTPATIENT
Start: 2024-01-20 | End: 2024-04-10 | Stop reason: SDUPTHER

## 2024-02-01 ENCOUNTER — HOSPITAL ENCOUNTER (OUTPATIENT)
Dept: CARDIOLOGY | Facility: HOSPITAL | Age: 74
Discharge: HOME | End: 2024-02-01
Payer: MEDICARE

## 2024-02-01 ENCOUNTER — OFFICE VISIT (OUTPATIENT)
Dept: OPHTHALMOLOGY | Facility: CLINIC | Age: 74
End: 2024-02-01
Payer: MEDICARE

## 2024-02-01 ENCOUNTER — OFFICE VISIT (OUTPATIENT)
Dept: INTERNAL MEDICINE | Facility: CLINIC | Age: 74
End: 2024-02-01
Payer: MEDICARE

## 2024-02-01 ENCOUNTER — PATIENT MESSAGE (OUTPATIENT)
Dept: OPHTHALMOLOGY | Facility: CLINIC | Age: 74
End: 2024-02-01

## 2024-02-01 VITALS
OXYGEN SATURATION: 94 % | HEART RATE: 94 BPM | DIASTOLIC BLOOD PRESSURE: 78 MMHG | SYSTOLIC BLOOD PRESSURE: 117 MMHG | TEMPERATURE: 98 F

## 2024-02-01 DIAGNOSIS — E11.9 CONTROLLED TYPE 2 DIABETES MELLITUS WITHOUT COMPLICATION, WITHOUT LONG-TERM CURRENT USE OF INSULIN: ICD-10-CM

## 2024-02-01 DIAGNOSIS — E11.9 DIABETES MELLITUS TYPE 2 WITHOUT RETINOPATHY: Primary | ICD-10-CM

## 2024-02-01 DIAGNOSIS — F14.11 HISTORY OF COCAINE ABUSE: ICD-10-CM

## 2024-02-01 DIAGNOSIS — G47.00 INSOMNIA, UNSPECIFIED TYPE: Chronic | ICD-10-CM

## 2024-02-01 DIAGNOSIS — Z01.818 PREOPERATIVE EXAMINATION: ICD-10-CM

## 2024-02-01 DIAGNOSIS — I10 PRIMARY HYPERTENSION: ICD-10-CM

## 2024-02-01 DIAGNOSIS — G56.03 BILATERAL CARPAL TUNNEL SYNDROME: Primary | ICD-10-CM

## 2024-02-01 DIAGNOSIS — F41.9 ANXIETY: ICD-10-CM

## 2024-02-01 DIAGNOSIS — Z96.1 PSEUDOPHAKIA OF BOTH EYES: ICD-10-CM

## 2024-02-01 DIAGNOSIS — I47.10 PAROXYSMAL SVT (SUPRAVENTRICULAR TACHYCARDIA): ICD-10-CM

## 2024-02-01 DIAGNOSIS — H52.7 REFRACTIVE ERRORS: ICD-10-CM

## 2024-02-01 DIAGNOSIS — Z01.818 PREOP TESTING: ICD-10-CM

## 2024-02-01 DIAGNOSIS — J45.20 MILD INTERMITTENT ASTHMA WITHOUT COMPLICATION: ICD-10-CM

## 2024-02-01 PROCEDURE — 99999 PR PBB SHADOW E&M-EST. PATIENT-LVL III: CPT | Mod: PBBFAC,,,

## 2024-02-01 PROCEDURE — 92014 COMPRE OPH EXAM EST PT 1/>: CPT | Mod: S$GLB,,, | Performed by: OPTOMETRIST

## 2024-02-01 PROCEDURE — 4010F ACE/ARB THERAPY RXD/TAKEN: CPT | Mod: CPTII,S$GLB,, | Performed by: SPECIALIST

## 2024-02-01 PROCEDURE — 3074F SYST BP LT 130 MM HG: CPT | Mod: CPTII,S$GLB,, | Performed by: SPECIALIST

## 2024-02-01 PROCEDURE — 93010 ELECTROCARDIOGRAM REPORT: CPT | Mod: ,,, | Performed by: INTERNAL MEDICINE

## 2024-02-01 PROCEDURE — 92015 DETERMINE REFRACTIVE STATE: CPT | Mod: S$GLB,,, | Performed by: OPTOMETRIST

## 2024-02-01 PROCEDURE — 99999 PR PBB SHADOW E&M-EST. PATIENT-LVL III: CPT | Mod: PBBFAC,,, | Performed by: OPTOMETRIST

## 2024-02-01 PROCEDURE — 1159F MED LIST DOCD IN RCRD: CPT | Mod: CPTII,S$GLB,, | Performed by: OPTOMETRIST

## 2024-02-01 PROCEDURE — 3078F DIAST BP <80 MM HG: CPT | Mod: CPTII,S$GLB,, | Performed by: SPECIALIST

## 2024-02-01 PROCEDURE — 99204 OFFICE O/P NEW MOD 45 MIN: CPT | Mod: S$GLB,,, | Performed by: SPECIALIST

## 2024-02-01 PROCEDURE — 4010F ACE/ARB THERAPY RXD/TAKEN: CPT | Mod: CPTII,S$GLB,, | Performed by: OPTOMETRIST

## 2024-02-01 PROCEDURE — 93005 ELECTROCARDIOGRAM TRACING: CPT

## 2024-02-01 PROCEDURE — 2023F DILAT RTA XM W/O RTNOPTHY: CPT | Mod: CPTII,S$GLB,, | Performed by: OPTOMETRIST

## 2024-02-01 NOTE — DISCHARGE INSTRUCTIONS
Pre op instructions reviewed with patient during Clinic Visit with Provider, verbalized understanding.    To confirm, Surgery is scheduled on 2/09/24. We will call you late afternoon the business day prior to surgery with your arrival time.    *Please report to the [facility] (1st Floor) located off of [facility] (2nd Entrance/Building on the left, in front of the flag pole).  Address: [address] [place] [address] [place]. [address]    INSTRUCTIONS IMPORTANT!!!  DO NOT Eat, Drink, or Smoke after 12 midnight unless instructed otherwise by your Surgeon. OK to brush teeth, no gum, candy or mints!    MORNING OF SURGERY, drink small sip of water with the following medications instructed by Pre-Admit Provider:  NONE-Pt takes all meds a night    *Additional Medication Instructions: HOLD OZEMPIC 7 DAYS PRIOR TO SURGERY-LAST DOSE 1/26/24    Diabetic Patients: If you take diabetic or weight loss medication, Do NOT take morning of surgery unless instructed by Doctor. Metformin to be stopped 24 hrs prior to surgery. Ozempic/ Mounjaro/ Wegovy/ Trulicity/ Semaglutide or any weight loss injections to be stopped 7 days prior to surgery. DO NOT take long-acting insulin the evening before surgery. Blood sugars will be checked in pre-op by Nurse.    *Patients should HOLD all vitamins, herbal supplements, weight loss medication, aspirin products & NSAIDS 7 days prior to surgery, as these can thin the blood. Ok to take Tylenol.    ____  Avoid Alcoholic beverages 3 days prior to surgery, as it can thin the blood.  ____  NO Acrylic/fake nails or nail polish worn day of surgery (specifically hand/arm & foot surgeries).  ____  NO powder, lotions, deodorants, oils or cream on body.  ____  Remove all jewelry, piercings, & foreign objects prior to arrival and leave at home.  ____  Remove Dentures, Hearing Aids & Contact Lens prior to surgery.  ____  Bring photo ID and insurance information to hospital (Leave Valuables at  Home).  ____  If going home the same day, arrange for a ride home. You will not be able to drive for 24 hrs if Anesthesia was used.   ____  Females (ages 11-60): may need to give a urine sample the morning of surgery; please see Pre op Nurse prior to using the restroom.  ____  Males: Stop ED medications (Viagra, Cialis) 24 hrs prior to surgery.  ____  Wear clean, loose fitting clothing to allow for dressings/ bandages.      Bathing Instructions:    -Shower with anti-bacterial Soap (Hibiclens or Dial) the night before surgery and the morning of.   -Do not use Hibiclens on your face or genitals.   -Apply clean clothes after shower.  -Do not shave your face or body 2 days prior to surgery unless instructed otherwise by your Surgeon.  -Do not shave pubic hair 7 days prior to surgery (gyn pt's).    [facility] Policy:  Only 2 adults allowed in pre op/recovery area during your procedure. You MUST HAVE A RIDE HOME from a responsible adult that you know and trust. Medical Transport, Uber or Lyft can ONLY be used if patient has a responsible adult to accompany them during ride home.    Discharge Instructions: You will receive Post-op/Discharge instructions by your Discharge Nurse prior to going home.   *Prevention of surgical site infections:   -Keep incisions clean and dry.   -Do not soak/submerge incisions in water until completely healed.   -Do not apply lotions, powders, creams, or deodorants to site.   -Always make sure hands are cleaned with antibacterial soap/ alcohol-based  prior to touching the surgical site.        *Signs and symptoms of Infection Before or After Surgery:               !!!If you experience any fever, chills, nausea/ vomiting, foul odor/ excessive drainage from surgical site, flu-like symptoms, new wounds or cuts, PLEASE CALL THE SURGEON OFFICE at [phone] or SEND MESSAGE THROUGH [employer]!!!       *If you are running late day of surgery, please call the Surgery Dept @  [phone].    *Billing question, please call  [phone]       Thank you,  -[facility] Pre Admit Dept.  [phone] or [phone]  M-F 7:30 am-4:00 pm (Closed Major Holidays)    Additional Tests Scheduled Today:  EKG (4TH Floor) Check in at the !  Additional Tests Scheduled Today:  LABS (1ST Floor) Check in at the !

## 2024-02-01 NOTE — PROGRESS NOTES
Preoperative History and Physical    Chief Complaint: Preoperative evaluation     History of Present Illness:      Cassandra Campos is a 73 y.o. female with a history of HTN, HLD, DM who presents to the office today for a preoperative consultation at the request of Dr. Oswald who plans on performing carpal tunnel release on .     Functional Status:      The patient is able to climb a flight of stairs. The patient is able to ambulate without difficulty. The patient's functional status is not affected by the surgical problem. The patient's functional status is not affected by shortness of breath, chest pain, dyspnea on exertion and fatigue.    MET score greater than 4    Past Medical History:      Past Medical History:   Diagnosis Date    Arthritis     hands    Bilateral bunions     Borderline glaucoma     De Quervain's disease (radial styloid tenosynovitis)     Gastritis     upper GI 2017    Hydradenitis     Hyperlipidemia     Hypertension     Insomnia     Migraines 2000    Nasal septum perforation     Obesity     Pneumonia     Restrictive airway disease     Sleep apnea     SVT (supraventricular tachycardia) 2013    Trigger finger     Type 2 diabetes mellitus      am 08/10/2021        Past Surgical History:      Past Surgical History:   Procedure Laterality Date    AXILLARY HIDRADENITIS EXCISION Bilateral     BONE EXOSTOSIS EXCISION Right 2018    Procedure: EXCISION, EXOSTOSIS;  Surgeon: Jayro Pedraza Sr., MD;  Location: [facility] OR;  Service: Orthopedics;  Laterality: Right;    BREAST BIOPSY Bilateral     both benign    BREAST SURGERY  1998    CARPAL TUNNEL RELEASE      bilateral    CATARACT EXTRACTION Bilateral     OU     SECTION  1979    CHOLECYSTECTOMY  2014    COLONOSCOPY N/A 10/02/2020    Procedure: COLONOSCOPY;  Surgeon: Tushar Edwards MD;  Location: [facility] ENDO;  Service: Endoscopy;  Laterality: N/A;    COLONOSCOPY  W/ POLYPECTOMY  10/02/2020    Polyps x3, repeat 5 years; Tusahr Edwards MD     CYST REMOVAL  2015    sebaceous cyst removed from face    DE QUERVAIN'S RELEASE Left 2020    Procedure: RELEASE, HAND, FOR DEQUERVAIN'S TENOSYNOVITIS;  Surgeon: Jaime Oswald MD;  Location: [facility];  Service: Orthopedics;  Laterality: Left;    DE QUERVAIN'S RELEASE Right 2020    Procedure: RELEASE, HAND, FOR DEQUERVAIN'S TENOSYNOVITIS;  Surgeon: Jaime Oswald MD;  Location: [facility];  Service: Orthopedics;  Laterality: Right;    EYE SURGERY      gastric sleeve  2017    Dr. Watson    KNEE SURGERY Right     OLECRANON BURSECTOMY Right 2018    Procedure: BURSECTOMY, OLECRANON;  Surgeon: Jayro Pedraza Sr., MD;  Location: [facility];  Service: Orthopedics;  Laterality: Right;    SURGICAL REMOVAL OF BUNION WITH OSTEOTOMY OF METATARSAL BONE Left 05/10/2019    Procedure: BUNIONECTOMY, WITH METATARSAL OSTEOTOMY;  Surgeon: Srinivasan Villanueva DPM;  Location: [facility];  Service: Podiatry;  Laterality: Left;    SURGICAL REMOVAL OF BUNION WITH OSTEOTOMY OF METATARSAL BONE Right 2019    Procedure: BUNIONECTOMY, WITH METATARSAL OSTEOTOMY;  Surgeon: Srinivasan Villanueva DPM;  Location: [facility];  Service: Podiatry;  Laterality: Right;    TONSILLECTOMY, ADENOIDECTOMY  1980s    TRIGGER FINGER RELEASE Right 2015    Dr. Pedraza        Social History:      Social History     Socioeconomic History    Marital status:     Number of children: 1   Occupational History    Occupation:  aid   Tobacco Use    Smoking status: Former     Current packs/day: 0.00     Average packs/day: 0.5 packs/day for 42.0 years (21.0 ttl pk-yrs)     Types: Cigarettes     Start date: 1970     Quit date: 2012     Years since quittin.0     Passive exposure: Past    Smokeless tobacco: Never   Substance and Sexual Activity    Alcohol use: Not Currently     Alcohol/week: 1.0 standard drink of alcohol     Types:  1 Glasses of wine per week     Comment: Glass red wine once a every 2 weeks    Drug use: Never    Sexual activity: Not Currently     Partners: Female     Birth control/protection: Abstinence, None   Social History Narrative    Single, part-time teacher. Masters degree biology.      Social Determinants of Health     Financial Resource Strain: Low Risk  (12/19/2023)    Overall Financial Resource Strain (CARDIA)     Difficulty of Paying Living Expenses: Not hard at all   Food Insecurity: Food Insecurity Present (12/19/2023)    Hunger Vital Sign     Worried About Running Out of Food in the Last Year: Never true     Ran Out of Food in the Last Year: Sometimes true   Transportation Needs: No Transportation Needs (12/19/2023)    PRAPARE - Transportation     Lack of Transportation (Medical): No     Lack of Transportation (Non-Medical): No   Physical Activity: Insufficiently Active (12/19/2023)    Exercise Vital Sign     Days of Exercise per Week: 3 days     Minutes of Exercise per Session: 20 min   Stress: No Stress Concern Present (12/19/2023)    [place] [employer] - Occupational Stress Questionnaire     Feeling of Stress : Not at all   Social Connections: Unknown (12/19/2023)    Social Connection and Isolation Panel [NHANES]     Frequency of Communication with Friends and Family: Never     Frequency of Social Gatherings with Friends and Family: More than three times a week     Active Member of Clubs or Organizations: Patient declined     Attends Club or Organization Meetings: 1 to 4 times per year     Marital Status:    Housing Stability: Low Risk  (12/19/2023)    Housing Stability Vital Sign     Unable to Pay for Housing in the Last Year: No     Number of Places Lived in the Last Year: 1     Unstable Housing in the Last Year: No        Family History:      Family History   Problem Relation Age of Onset    Prostate cancer Brother     Diabetes Maternal Aunt     Diabetes Cousin     Hypertension  Maternal Grandmother        Allergies:      Review of patient's allergies indicates:  No Known Allergies    Medications:      Current Outpatient Medications   Medication Sig    ACCU-CHEK GUIDE TEST STRIPS Strp USE TO TEST TWICE A DAY    albuterol (PROVENTIL/VENTOLIN HFA) 90 mcg/actuation inhaler INHALE 2 PUFFS INTO THE LUNGS EVERY 4 HOURS AS NEEDED FOR WHEEZING OR SHORTNESS OF BREATH.    blood glucose control, high (TRUE METRIX LEVEL 3) Soln 1 each by Other route once daily.    blood-glucose meter Misc 1 each by Misc.(Non-Drug; Combo Route) route 2 (two) times a day.    cholecalciferol, vitamin D3, (VITAMIN D3) 25 mcg (1,000 unit) capsule Take 1,000 Units by mouth once daily.    eszopiclone (LUNESTA) 3 mg Tab Take 1 tablet (3 mg total) by mouth every evening.    lancing device with lancets (ACCU-CHEK SOFT DEV LANCETS) Kit 1 each by Misc.(Non-Drug; Combo Route) route 2 (two) times a day.    LORazepam (ATIVAN) 1 MG tablet Take 1 tablet (1 mg total) by mouth 2 (two) times daily.    losartan (COZAAR) 25 MG tablet TAKE 1 TABLET BY MOUTH ONCE  DAILY    metoprolol succinate (TOPROL-XL) 50 MG 24 hr tablet TAKE 1 TABLET BY MOUTH ONCE  DAILY    omeprazole (PRILOSEC) 20 MG capsule TAKE 1 CAPSULE BY MOUTH ONCE  DAILY    semaglutide (OZEMPIC) 2 mg/dose (8 mg/3 mL) PnIj Inject 2 mg into the skin every 7 days.    TRUEPLUS LANCETS 33 gauge Misc TEST BLOOD SUGAR ONE TIME DAILY     No current facility-administered medications for this visit.     Facility-Administered Medications Ordered in Other Visits   Medication    lactated ringers infusion    nozaseptin (NOZIN) nasal        Vitals:      Vitals:    02/01/24 1323   BP: 117/78   Pulse: 94   Temp: 97.5 °F (36.4 °C)       Review of Systems:        Constitutional: Negative for fever, chills, weight loss, malaise/fatigue and diaphoresis.   HENT: Negative for hearing loss, ear pain, nosebleeds, congestion, sore throat, neck pain, tinnitus and ear discharge.    Eyes: Negative for  blurred vision, double vision, photophobia, pain, discharge and redness.   Respiratory: Negative for cough, hemoptysis, sputum production, shortness of breath, wheezing and stridor.    Cardiovascular: Negative for chest pain, palpitations, orthopnea, claudication, leg swelling and PND.   Gastrointestinal: Negative for heartburn, nausea, vomiting, abdominal pain, diarrhea, constipation, blood in stool and melena.   Genitourinary: Negative for dysuria, urgency, frequency, hematuria and flank pain.   Musculoskeletal: +right hand pain Negative for myalgias, back pain, joint pain and falls.   Skin: Negative for itching and rash.   Neurological: Negative for dizziness, tingling, tremors, sensory change, speech change, focal weakness, seizures, loss of consciousness, weakness and headaches.   Endo/Heme/Allergies: Negative for environmental allergies and polydipsia. Does not bruise/bleed easily.   Psychiatric/Behavioral: Negative for depression, suicidal ideas, hallucinations, memory loss and substance abuse. The patient is not nervous/anxious and does not have insomnia.    All 14 systems reviewed and negative except as noted above.    Physical Exam:      Constitutional: Appears well-developed, well-nourished and in no acute distress.  Patient is oriented to person, place, and time.   Head: Normocephalic and atraumatic. Mucous membranes moist.  Neck: Neck supple no mass.   Cardiovascular: Normal rate and regular rhythm.  S1 S2 appreciated by ascultation.  Pulmonary/Chest: Effort normal and clear to auscultation bilaterally. No respiratory distress.   Abdomen: Soft. Non-tender and non-distended. Bowel sounds are normal.   Neurological: Patient is alert and oriented to person, place and time. Moves all extremities.  Skin: Warm and dry. No lesions.  Extremities: No clubbing, cyanosis or edema.    Laboratory data:      Reviewed and noted in plan where applicable. Please see chart for full laboratory data.    Lab Results    Component Value Date    WBC 7.12 02/01/2024    HGB 13.9 02/01/2024    HCT 41.8 02/01/2024    MCV 76 (L) 02/01/2024     02/01/2024       Sodium   Date Value Ref Range Status   02/01/2024 139 136 - 145 mmol/L Final     Chloride   Date Value Ref Range Status   02/01/2024 106 95 - 110 mmol/L Final     CO2   Date Value Ref Range Status   02/01/2024 26 23 - 29 mmol/L Final     Glucose   Date Value Ref Range Status   02/01/2024 78 70 - 110 mg/dL Final     BUN   Date Value Ref Range Status   02/01/2024 7 (L) 8 - 23 mg/dL Final     Creatinine   Date Value Ref Range Status   02/01/2024 0.7 0.5 - 1.4 mg/dL Final     Calcium   Date Value Ref Range Status   02/01/2024 9.7 8.7 - 10.5 mg/dL Final     Anion Gap   Date Value Ref Range Status   02/01/2024 7 (L) 8 - 16 mmol/L Final     eGFR   Date Value Ref Range Status   02/01/2024 >60 >60 mL/min/1.73 m^2 Final           Predictors of intubation difficulty:       Morbid obesity? yes - BMI 31   Anatomically abnormal facies? no   Prominent incisors? no   Receding mandible? no   Short, thick neck? no   Neck range of motion: normal   Dentition:  upper and lower dentures  Based on the Modified Mallampati, patient is a mallampati score: I (soft palate, uvula, fauces, and tonsillar pillars visible)    Cardiographics:      ECG: normal sinus rhythm, no blocks or conduction defects, no ischemic changes  Echocardiogram: not indicated    Imaging:      Chest x-ray: not indicated    Assessment and Plan:      Bilateral carpal tunnel syndrome  - follows with Dr. Oswald, planning on carparl tunnel release 2.9  - hx of previous carpal tunnel release approx 4 years ago, tolerated surgery well    Known risk factors for perioperative complications: HTN, asthma  Difficulty with intubation is not anticipated.    Cardiac Risk Estimation: Based on the Revised Cardiac Risk index, patient is a Class I risk with a 3.9% risk of a major cardiac event in a low risk procedure.    1.) Preoperative workup  as follows: ECG, hemoglobin, hematocrit, electrolytes, creatinine, glucose.  2.) Change in medication regimen before surgery: not on any asa/nsaids, hold home ozempic 1 week prior to surgery;   3.) Prophylaxis for cardiac events with perioperative beta-blockers: continue home metoprolol am of surgery .  4.) Invasive hemodynamic monitoring perioperatively: at the discretion of anesthesiologist.  5.) Deep vein thrombosis prophylaxis postoperatively: intermittent pneumatic compression boots and regimen to be chosen by surgical team.  6.) Current medications which may produce withdrawal symptoms if withheld perioperatively: none  7.) Other measures: Postoperative hypertension management with IV hydralazine until able to take oral medications.  Postoperative incentive spirometry to prevent pneumonia.    Anxiety  - ativan as needed    Mild intermittent asthma  - albuterol inhaler as needed, has not used in over a month  - no recent hospitalizations for exacerbation   - o2 sats stable on room air     Primary hypertension  - on losartan nightly, may continue    Controlled type 2 diabetes mellitus without complication, without long-term current use of insulin  - A1C 5.5 in 12/23  - holding home ozempic 1 week prior to sx     Paroxysmal SVT (supraventricular tachycardia)  - remote hx of episode of svt in 2013, remains on metoprolol nightly, will continue    Insomnia  - Lunesta as needed nightly        Electronically signed by Yesy Ricardo MSN, FNP-C on 2/1/2024 at 1:47 PM.

## 2024-02-01 NOTE — ASSESSMENT & PLAN NOTE
- follows with Dr. Oswald, planning on carparl tunnel release 2.9    Known risk factors for perioperative complications: HTN, asthma  Difficulty with intubation is not anticipated.    Cardiac Risk Estimation: Based on the Revised Cardiac Risk index, patient is a Class risk with a % risk of a major cardiac event in a low risk procedure.    1.) Preoperative workup as follows: ECG, hemoglobin, hematocrit, electrolytes, creatinine, glucose.  2.) Change in medication regimen before surgery: discontinue ASA 6 days before surgery, discontinue NSAIDs 5 days before surgery, hold Metformin 24 hours prior to surgery.  3.) Prophylaxis for cardiac events with perioperative beta-blockers: continue home metoprolol am of surgery .  4.) Invasive hemodynamic monitoring perioperatively: at the discretion of anesthesiologist.  5.) Deep vein thrombosis prophylaxis postoperatively: intermittent pneumatic compression boots and regimen to be chosen by surgical team.  6.) Current medications which may produce withdrawal symptoms if withheld perioperatively: none  7.) Other measures: Postoperative hypertension management with IV hydralazine until able to take oral medications.  Postoperative incentive spirometry to prevent pneumonia.

## 2024-02-01 NOTE — ASSESSMENT & PLAN NOTE
- albuterol inhaler as needed, has not used in over a month  - no recent hospitalizations for exacerbation   - o2 sats stable on room air

## 2024-02-01 NOTE — PRE-PROCEDURE INSTRUCTIONS
To confirm, Surgery is scheduled on 2/09/24. We will call you late afternoon the business day prior to surgery with your arrival time.    *Please report to the [facility] (1st Floor) located off of [facility] (2nd Entrance/Building on the left, in front of the flag pole).  Address: [address] [place] [address] [place]. [address]    INSTRUCTIONS IMPORTANT!!!  DO NOT Eat, Drink, or Smoke after 12 midnight unless instructed otherwise by your Surgeon. OK to brush teeth, no gum, candy or mints!    MORNING OF SURGERY, drink small sip of water with the following medications instructed by Pre-Admit Provider:  NONE    *Additional Medication Instructions: HOLD OZEMPIC 7 DAYS PRIOR TO SURGERY-LAST DOSE 1/26/24    Diabetic Patients: If you take diabetic or weight loss medication, Do NOT take morning of surgery unless instructed by Doctor. Metformin to be stopped 24 hrs prior to surgery. Ozempic/ Mounjaro/ Wegovy/ Trulicity/ Semaglutide or any weight loss injections to be stopped 7 days prior to surgery. DO NOT take long-acting insulin the evening before surgery. Blood sugars will be checked in pre-op by Nurse.    *Patients should HOLD all vitamins, herbal supplements, weight loss medication, aspirin products & NSAIDS 7 days prior to surgery, as these can thin the blood. Ok to take Tylenol.    ____  Avoid Alcoholic beverages 3 days prior to surgery, as it can thin the blood.  ____  NO Acrylic/fake nails or nail polish worn day of surgery (specifically hand/arm & foot surgeries).  ____  NO powder, lotions, deodorants, oils or cream on body.  ____  Remove all jewelry, piercings, & foreign objects prior to arrival and leave at home.  ____  Remove Dentures, Hearing Aids & Contact Lens prior to surgery.  ____  Bring photo ID and insurance information to hospital (Leave Valuables at Home).  ____  If going home the same day, arrange for a ride home. You will not be able to drive for 24 hrs if Anesthesia was used.   ____   yes Females (ages 11-60): may need to give a urine sample the morning of surgery; please see Pre op Nurse prior to using the restroom.  ____  Males: Stop ED medications (Viagra, Cialis) 24 hrs prior to surgery.  ____  Wear clean, loose fitting clothing to allow for dressings/ bandages.      Bathing Instructions:    -Shower with anti-bacterial Soap (Hibiclens or Dial) the night before surgery and the morning of.   -Do not use Hibiclens on your face or genitals.   -Apply clean clothes after shower.  -Do not shave your face or body 2 days prior to surgery unless instructed otherwise by your Surgeon.  -Do not shave pubic hair 7 days prior to surgery (gyn pt's).    [facility]Ride Policy:  Only 2 adults allowed in pre op/recovery area during your procedure. You MUST HAVE A RIDE HOME from a responsible adult that you know and trust. Medical Transport, Uber or Lyft can ONLY be used if patient has a responsible adult to accompany them during ride home.    Discharge Instructions: You will receive Post-op/Discharge instructions by your Discharge Nurse prior to going home.   *Prevention of surgical site infections:   -Keep incisions clean and dry.   -Do not soak/submerge incisions in water until completely healed.   -Do not apply lotions, powders, creams, or deodorants to site.   -Always make sure hands are cleaned with antibacterial soap/ alcohol-based  prior to touching the surgical site.        *Signs and symptoms of Infection Before or After Surgery:               !!!If you experience any fever, chills, nausea/ vomiting, foul odor/ excessive drainage from surgical site, flu-like symptoms, new wounds or cuts, PLEASE CALL THE SURGEON OFFICE at [phone] or SEND MESSAGE THROUGH [employer]!!!       *If you are running late day of surgery, please call the Surgery Dept @ [phone].    *Billing question, please call  [phone]       Thank you,  -[facility] Pre Admit Dept.  [phone]  or [phone]  M-F 7:30 am-4:00 pm (Closed Major Holidays)    Additional Tests Scheduled Today:  EKG (4TH Floor) Check in at the !  Additional Tests Scheduled Today:  LABS (1ST Floor) Check in at the !

## 2024-02-01 NOTE — PROGRESS NOTES
SUBJECTIVE  Cassandra Maria Campos is 73 y.o. female  Corrected distance visual acuity was 20/25 -2 in the right eye and 20/25 -2 in the left eye. Corrected near visual acuity was J4 in the right eye and J2 in the left eye.   Chief Complaint   Patient presents with    Diabetic Eye Exam    Hypertensive Eye Exam    Eye Exam          HPI    NIDDM exam  No visual complaints.  Last eye exam 01/24/2023 TRF.  Update glasses RX.  Lab Results       Component                Value               Date                       HGBA1C                   5.5                 12/26/2023              Last edited by Ioana Lorenz MA on 2/1/2024  2:18 PM.         Assessment /Plan :  1. Diabetes mellitus type 2 without retinopathy   No Background Diabetic Retinopathy  Strict BG control, f/u w/ PCP, and annual DFE  Stressed importance of DM control to preserve vision      2. Pseudophakia of both eyes   Well-centered, stable IOL OU. Monitor annually.      3. Refractive errors   Dispense Final Rx for glasses.  RTC 1 year  Discussed above and answered questions.

## 2024-02-06 ENCOUNTER — ANESTHESIA EVENT (OUTPATIENT)
Dept: SURGERY | Facility: HOSPITAL | Age: 74
End: 2024-02-06
Payer: MEDICARE

## 2024-02-06 ENCOUNTER — PATIENT MESSAGE (OUTPATIENT)
Dept: SPORTS MEDICINE | Facility: CLINIC | Age: 74
End: 2024-02-06
Payer: MEDICARE

## 2024-02-07 ENCOUNTER — OFFICE VISIT (OUTPATIENT)
Dept: SPORTS MEDICINE | Facility: CLINIC | Age: 74
End: 2024-02-07
Payer: MEDICARE

## 2024-02-07 VITALS — RESPIRATION RATE: 17 BRPM | HEIGHT: 63 IN | BODY MASS INDEX: 31.89 KG/M2 | WEIGHT: 180 LBS

## 2024-02-07 DIAGNOSIS — M67.814 TENDINOSIS OF LEFT ROTATOR CUFF: Primary | ICD-10-CM

## 2024-02-07 DIAGNOSIS — M54.12 CERVICAL RADICULOPATHY: ICD-10-CM

## 2024-02-07 DIAGNOSIS — M67.813 TENDINOSIS OF RIGHT ROTATOR CUFF: ICD-10-CM

## 2024-02-07 PROCEDURE — 1101F PT FALLS ASSESS-DOCD LE1/YR: CPT | Mod: CPTII,S$GLB,, | Performed by: STUDENT IN AN ORGANIZED HEALTH CARE EDUCATION/TRAINING PROGRAM

## 2024-02-07 PROCEDURE — 1160F RVW MEDS BY RX/DR IN RCRD: CPT | Mod: CPTII,S$GLB,, | Performed by: STUDENT IN AN ORGANIZED HEALTH CARE EDUCATION/TRAINING PROGRAM

## 2024-02-07 PROCEDURE — 20610 DRAIN/INJ JOINT/BURSA W/O US: CPT | Mod: 50,S$GLB,, | Performed by: STUDENT IN AN ORGANIZED HEALTH CARE EDUCATION/TRAINING PROGRAM

## 2024-02-07 PROCEDURE — 1159F MED LIST DOCD IN RCRD: CPT | Mod: CPTII,S$GLB,, | Performed by: STUDENT IN AN ORGANIZED HEALTH CARE EDUCATION/TRAINING PROGRAM

## 2024-02-07 PROCEDURE — 1125F AMNT PAIN NOTED PAIN PRSNT: CPT | Mod: CPTII,S$GLB,, | Performed by: STUDENT IN AN ORGANIZED HEALTH CARE EDUCATION/TRAINING PROGRAM

## 2024-02-07 PROCEDURE — 99214 OFFICE O/P EST MOD 30 MIN: CPT | Mod: 25,S$GLB,, | Performed by: STUDENT IN AN ORGANIZED HEALTH CARE EDUCATION/TRAINING PROGRAM

## 2024-02-07 PROCEDURE — 99999 PR PBB SHADOW E&M-EST. PATIENT-LVL III: CPT | Mod: PBBFAC,,, | Performed by: STUDENT IN AN ORGANIZED HEALTH CARE EDUCATION/TRAINING PROGRAM

## 2024-02-07 PROCEDURE — 4010F ACE/ARB THERAPY RXD/TAKEN: CPT | Mod: CPTII,S$GLB,, | Performed by: STUDENT IN AN ORGANIZED HEALTH CARE EDUCATION/TRAINING PROGRAM

## 2024-02-07 PROCEDURE — 3008F BODY MASS INDEX DOCD: CPT | Mod: CPTII,S$GLB,, | Performed by: STUDENT IN AN ORGANIZED HEALTH CARE EDUCATION/TRAINING PROGRAM

## 2024-02-07 PROCEDURE — 3288F FALL RISK ASSESSMENT DOCD: CPT | Mod: CPTII,S$GLB,, | Performed by: STUDENT IN AN ORGANIZED HEALTH CARE EDUCATION/TRAINING PROGRAM

## 2024-02-07 RX ORDER — TRIAMCINOLONE ACETONIDE 40 MG/ML
40 INJECTION, SUSPENSION INTRA-ARTICULAR; INTRAMUSCULAR
Status: DISCONTINUED | OUTPATIENT
Start: 2024-02-07 | End: 2024-02-07 | Stop reason: HOSPADM

## 2024-02-07 RX ADMIN — TRIAMCINOLONE ACETONIDE 40 MG: 40 INJECTION, SUSPENSION INTRA-ARTICULAR; INTRAMUSCULAR at 07:02

## 2024-02-07 NOTE — PROGRESS NOTES
Orthopaedic Follow-Up Visit    Last Appointment: 11/21/23  Diagnosis: Chronic left shoulder rotator cuff tendinosis, right rotator cuff tendinosis, cervical radiculopathy  Prior Procedure: HEP    Cassandra Campos is a 73 y.o. female who is here for f/u evaluation of her left shoulder. The patient was last seen here by me on 11/7/23 at which point her MRI showed left shoulder rotator cuff tendinosis without any significant tear.  Based on imaging findings, I did not recommend any operative treatment.  We went over options for continued nonoperative management.  Some of her symptoms seem to be related to her cervical spine and could be radicular in nature. I recommended physical therapy to improve blood flow and strength to her rotator cuff and shoulders but patient deferred and opted for a home exercise program instead. I also recommended short course of oral steroids for treatment of cervical radiculopathy. The patient returns today ***    To review her history, Cassandra Campos is a 73 y.o. right-hand dominant female who presented on 9/27/23 with left shoulder pain and dysfunction that had started 3-4 weeks prior due to a fall onto the left shoulder. Her symptoms included pain and lack of range of motion. Her pain was aggravated by overhead movement, reaching movements. She had tried rest, activity modification, Muscle relaxer, ibuprofen, heat and ice. Her symptoms and physical exam were most consistent with bilateral shoulder subacromial impingement and rotator cuff tendinopathy. She had tried rest, activity modification, Muscle relaxer, ibuprofen, heat and ice. We proceeded with corticosteroid injections into the subacromial space and home exercise program. At follow-up on 11/7/23 she continued to have left shoulder pain and symptoms despite treatments of rest, activity modification, Tylenol, corticosteroid injection and home exercise program, so we proceeded with MRI of the left shoulder for further  evaluation. We also started her on Mobic to try and help her pain at night.    Patient's medications, allergies, past medical, surgical, social and family histories were reviewed and updated as appropriate.    Review of Systems   All systems reviewed were negative.  Specifically, the patient denies fever, chills, weight loss, chest pain, shortness of breath, or dyspnea on exertion.      Past Medical History:   Diagnosis Date    Arthritis     hands    Bilateral bunions     Borderline glaucoma     De Quervain's disease (radial styloid tenosynovitis)     Gastritis     upper GI 2017    Hydradenitis     Hyperlipidemia     Hypertension     Insomnia     Migraines 2000    Nasal septum perforation     Obesity     Pneumonia     Restrictive airway disease     Sleep apnea     SVT (supraventricular tachycardia) 2013    Trigger finger     Type 2 diabetes mellitus      am 2024       Past Surgical History:   Procedure Laterality Date    AXILLARY HIDRADENITIS EXCISION Bilateral     BONE EXOSTOSIS EXCISION Right 2018    Procedure: EXCISION, EXOSTOSIS;  Surgeon: Jayro Pedraza Sr., MD;  Location: [facility];  Service: Orthopedics;  Laterality: Right;    BREAST BIOPSY Bilateral     both benign    BREAST SURGERY  1998    CARPAL TUNNEL RELEASE      bilateral    CATARACT EXTRACTION Bilateral     OU     SECTION  1979    CHOLECYSTECTOMY  2014    COLONOSCOPY N/A 10/02/2020    Procedure: COLONOSCOPY;  Surgeon: Tushar Edwards MD;  Location: [facility];  Service: Endoscopy;  Laterality: N/A;    COLONOSCOPY W/ POLYPECTOMY  10/02/2020    Polyps x3, repeat 5 years; Tushar Edwards MD     CYST REMOVAL  2015    sebaceous cyst removed from face    DE QUERVAIN'S RELEASE Left 2020    Procedure: RELEASE, HAND, FOR DEQUERVAIN'S TENOSYNOVITIS;  Surgeon: Jaime Oswald MD;  Location: [facility] OR;  Service: Orthopedics;  Laterality: Left;    DE QUERVAIN'S RELEASE Right 2020    Procedure:  RELEASE, HAND, FOR DEQUERVAIN'S TENOSYNOVITIS;  Surgeon: Jaime Oswald MD;  Location: [facility] OR;  Service: Orthopedics;  Laterality: Right;    EYE SURGERY      gastric sleeve  02/13/2017    Dr. Watson    KNEE SURGERY Right     OLECRANON BURSECTOMY Right 07/25/2018    Procedure: BURSECTOMY, OLECRANON;  Surgeon: Jayro Pedraza Sr., MD;  Location: [facility] OR;  Service: Orthopedics;  Laterality: Right;    SURGICAL REMOVAL OF BUNION WITH OSTEOTOMY OF METATARSAL BONE Left 05/10/2019    Procedure: BUNIONECTOMY, WITH METATARSAL OSTEOTOMY;  Surgeon: Srinivasan Villanueva DPM;  Location: [facility] OR;  Service: Podiatry;  Laterality: Left;    SURGICAL REMOVAL OF BUNION WITH OSTEOTOMY OF METATARSAL BONE Right 06/28/2019    Procedure: BUNIONECTOMY, WITH METATARSAL OSTEOTOMY;  Surgeon: Srinivasan Villanueva DPM;  Location: [facility] OR;  Service: Podiatry;  Laterality: Right;    TONSILLECTOMY, ADENOIDECTOMY  1980s    TRIGGER FINGER RELEASE Right 04/01/2015    Dr. Pedraza       Patient's Medications   New Prescriptions    No medications on file   Previous Medications    ACCU-CHEK GUIDE TEST STRIPS STRP    USE TO TEST TWICE A DAY    ALBUTEROL (PROVENTIL/VENTOLIN HFA) 90 MCG/ACTUATION INHALER    INHALE 2 PUFFS INTO THE LUNGS EVERY 4 HOURS AS NEEDED FOR WHEEZING OR SHORTNESS OF BREATH.    BLOOD GLUCOSE CONTROL, HIGH (TRUE METRIX LEVEL 3) SOLN    1 each by Other route once daily.    BLOOD-GLUCOSE METER MISC    1 each by Misc.(Non-Drug; Combo Route) route 2 (two) times a day.    CHOLECALCIFEROL, VITAMIN D3, (VITAMIN D3) 25 MCG (1,000 UNIT) CAPSULE    Take 1,000 Units by mouth once daily.    ESZOPICLONE (LUNESTA) 3 MG TAB    Take 1 tablet (3 mg total) by mouth every evening.    LANCING DEVICE WITH LANCETS (ACCU-CHEK SOFT DEV LANCETS) KIT    1 each by Misc.(Non-Drug; Combo Route) route 2 (two) times a day.    LORAZEPAM (ATIVAN) 1 MG TABLET    Take 1 tablet (1 mg total) by mouth 2 (two) times daily.    LOSARTAN (COZAAR) 25 MG TABLET    TAKE 1 TABLET BY MOUTH  ONCE  DAILY    METOPROLOL SUCCINATE (TOPROL-XL) 50 MG 24 HR TABLET    TAKE 1 TABLET BY MOUTH ONCE  DAILY    OMEPRAZOLE (PRILOSEC) 20 MG CAPSULE    TAKE 1 CAPSULE BY MOUTH ONCE  DAILY    SEMAGLUTIDE (OZEMPIC) 2 MG/DOSE (8 MG/3 ML) PNIJ    Inject 2 mg into the skin every 7 days.    TRUEPLUS LANCETS 33 GAUGE MISC    TEST BLOOD SUGAR ONE TIME DAILY   Modified Medications    No medications on file   Discontinued Medications    No medications on file       Family History   Problem Relation Age of Onset    Prostate cancer Brother     Diabetes Maternal Aunt     Diabetes Cousin     Hypertension Maternal Grandmother        Review of patient's allergies indicates:  No Known Allergies        Objective:      Physical Exam  Patient is alert and oriented, no distress. Skin is intact. Neuro is normal with no focal motor or sensory findings.    Limited cervical ROM. + spurling's test    Physical Exam:                       RIGHT                                     LEFT     Scap Dyskinesis/Winging       (-)                                             (-)     Tenderness:                                                                              Greater Tuberosity                  (-)                                            +  Bicipital Groove                       (-)                                             (-)  AC joint                                   (-)                                             (-)  Other:      ROM:  Forward Elevation       160                                          140  Abduction                    120                                          100  ER (at side)                 80                                            50     Strength:   Supraspinatus             5/5                                           4/5  Infraspinatus               5/5                                           4/5  Subscap / IR               5/5                                           5/5      Special Tests:               Neer:                                       (-)                                             +              Whalen:                                  +                                             +              SS Stress:                                +                                             +              Bear Hug:                                (-)                                             (-)              Modena's:                                 +                                             +              Resisted Thrower's:                +                                             +              Cross Arm Abduction:             (-)                                             (-)       Neurovascular examination  - Motor grossly intact bilaterally to shoulder abduction, elbow flexion and extension, wrist flexion and extension, and intrinsic hand musculature  - Sensation intact to light touch bilaterally in axillary, median, radial, and ulnar distributions  - Symmetrical radial pulses    Imaging:    XR Results:  Results for orders placed during the hospital encounter of 09/27/23    X-Ray Shoulder 2 or more views Bilat    Narrative  EXAM: XR SHOULDER COMPLETE 2 OR MORE VIEWS BILATERAL    CLINICAL HISTORY: [Pain]    FINDINGS: 8 views bilateral shoulders.  Mild AC joint degenerative changes.  No evidence of acute fracture or dislocation.  Normal mineralization.  Soft tissues are unremarkable.    Impression  No acute findings.    Finalized on: 9/27/2023 10:07 AM By:  Sergei Mcclure MD  [facility]# [id number]      2023-09-27 10:09:[id number]    [facility]      MRI Results:  MRI Shoulder Without Contrast Left  Narrative: EXAM:  MRI SHOULDER WITHOUT CONTRAST LEFT    CLINICAL HISTORY: Left shoulder pain. Shoulder pain, rotator cuff disorder suspected, xray done; [M25.512]-Pain in left shoulder./[G89.29]-Other chronic pain.    TECHNIQUE: Standard multiplanar, multisequence MR imaging protocol of the left shoulder was  performed.    FINDINGS:  ROTATOR CUFF: Moderate to severe supraspinatus, infraspinatus and subscapularis tendinosis.  Tiny low-grade partial interstitial tear of the posterior supraspinatus tendon insertion at the footprint. Small high-grade partial articular surface tear of the subscapularis tendon insertion.  No other rotator cuff tear identified.    MUSCULATURE: Within normal limits.    BURSITIS: Mild subcoracoid bursitis.    LONG HEAD BICEPS TENDON: Intact and normal in position.    LABRUM and GLENOHUMERAL LIGAMENTS: No discernible tears.    MARROW: No fractures, marrow edema or suspicious bone lesions.    GLENOHUMERAL JOINT: No full-thickness chondral defects.  Normal alignment.  Small joint effusion.    AC JOINT: Mild  osteoarthritis with capsular hypertrophy and small marginal osteophytes.  Normal alignment.    ACROMIAL ARCH: Type 1 acromion with minor anterior downsloping.  No os acromiale.  Impression: 1.  Significant rotator cuff tendinosis.  Tiny low-grade interstitial tear supraspinatus tendon insertion.  Small high-grade partial articular surface tear subscapularis tendon insertion.  Mild subcoracoid bursitis.  2.  Mild acromioclavicular osteoarthritis.  Glenohumeral joint effusion.  Minimally downsloping acromion.    Finalized on: 11/20/2023 9:18 AM By:  Dmitri Helm MD  [facility]# [id number]      2023-11-20 09:20:[id number]    [facility]      CT Results:  No results found for this or any previous visit.      Physician read: I agree with the above impression.    Assessment/Plan:   Cassandra Campos is a 73 y.o. female with chronic left shoulder rotator cuff tendinosis, right rotator cuff tendinosis, cervical radiculopathy.      Plan:    She has left shoulder rotator cuff tendinosis without any significant tear as evidenced on prior MRI and symptoms and exam findings consistent with right shoulder rotator cuff tendinosis as well. ***  ***  Follow up ***          Francesco Palma MD    I, Toñito Martinez, acted as a  Francesco Brooks MD for the duration of this office visit.

## 2024-02-07 NOTE — PROGRESS NOTES
Orthopaedic Follow-Up Visit    Last Appointment: 11/21/23  Diagnosis: Chronic left shoulder rotator cuff tendinosis, right rotator cuff tendinosis, cervical radiculopathy  Prior Procedure: HEP    Cassandra Campos is a 73 y.o. female who is here for f/u evaluation of her left shoulder. The patient was last seen here by me on 11/7/23 at which point her MRI showed left shoulder rotator cuff tendinosis without any significant tear.  Based on imaging findings, I did not recommend any operative treatment.  We went over options for continued nonoperative management.  Some of her symptoms seem to be related to her cervical spine and could be radicular in nature. I recommended physical therapy to improve strength to her rotator cuff and shoulders but patient deferred and opted for a home exercise program instead. I also recommended short course of oral steroids for treatment of cervical radiculopathy. The patient returns today reporting the pain has returned. Of note, she is getting carpal tunnel surgery in the next two weeks.     To review her history, Cassandra Campos is a 73 y.o. right-hand dominant female who presented on 9/27/23 with left shoulder pain and dysfunction that had started 3-4 weeks prior due to a fall onto the left shoulder. Her symptoms included pain and lack of range of motion. Her pain was aggravated by overhead movement, reaching movements. She had tried rest, activity modification, Muscle relaxer, ibuprofen, heat and ice. Her symptoms and physical exam were most consistent with bilateral shoulder subacromial impingement and rotator cuff tendinopathy. She had tried rest, activity modification, Muscle relaxer, ibuprofen, heat and ice. We proceeded with corticosteroid injections into the subacromial space and home exercise program. At follow-up on 11/7/23 she continued to have left shoulder pain and symptoms despite treatments of rest, activity modification, Tylenol, corticosteroid injection and home  exercise program, so we proceeded with MRI of the left shoulder for further evaluation. We also started her on Mobic to try and help her pain at night.    Patient's medications, allergies, past medical, surgical, social and family histories were reviewed and updated as appropriate.    Review of Systems   All systems reviewed were negative.  Specifically, the patient denies fever, chills, weight loss, chest pain, shortness of breath, or dyspnea on exertion.      Past Medical History:   Diagnosis Date    Arthritis     hands    Bilateral bunions     Borderline glaucoma     De Quervain's disease (radial styloid tenosynovitis)     Gastritis     upper GI 2017    Hydradenitis     Hyperlipidemia     Hypertension     Insomnia     Migraines 2000    Nasal septum perforation     Obesity     Pneumonia     Restrictive airway disease     Sleep apnea     SVT (supraventricular tachycardia) 2013    Trigger finger     Type 2 diabetes mellitus      am 2024       Past Surgical History:   Procedure Laterality Date    AXILLARY HIDRADENITIS EXCISION Bilateral     BONE EXOSTOSIS EXCISION Right 2018    Procedure: EXCISION, EXOSTOSIS;  Surgeon: Jayro Pedraza Sr., MD;  Location: [facility];  Service: Orthopedics;  Laterality: Right;    BREAST BIOPSY Bilateral     both benign    BREAST SURGERY  1998    CARPAL TUNNEL RELEASE      bilateral    CATARACT EXTRACTION Bilateral     OU     SECTION  1979    CHOLECYSTECTOMY  2014    COLONOSCOPY N/A 10/02/2020    Procedure: COLONOSCOPY;  Surgeon: Tushar Edwards MD;  Location: [facility];  Service: Endoscopy;  Laterality: N/A;    COLONOSCOPY W/ POLYPECTOMY  10/02/2020    Polyps x3, repeat 5 years; Tushar Edwards MD     CYST REMOVAL  2015    sebaceous cyst removed from face    DE QUERVAIN'S RELEASE Left 2020    Procedure: RELEASE, HAND, FOR DEQUERVAIN'S TENOSYNOVITIS;  Surgeon: Jaime Oswald MD;  Location: [facility] OR;  Service: Orthopedics;   Laterality: Left;    DE QUERVAIN'S RELEASE Right 11/20/2020    Procedure: RELEASE, HAND, FOR DEQUERVAIN'S TENOSYNOVITIS;  Surgeon: Jaime Oswald MD;  Location: [facility] OR;  Service: Orthopedics;  Laterality: Right;    EYE SURGERY      gastric sleeve  02/13/2017    Dr. Watson    KNEE SURGERY Right     OLECRANON BURSECTOMY Right 07/25/2018    Procedure: BURSECTOMY, OLECRANON;  Surgeon: Jayro Pderaza Sr., MD;  Location: [facility] OR;  Service: Orthopedics;  Laterality: Right;    SURGICAL REMOVAL OF BUNION WITH OSTEOTOMY OF METATARSAL BONE Left 05/10/2019    Procedure: BUNIONECTOMY, WITH METATARSAL OSTEOTOMY;  Surgeon: Srinivasan Villanueva DPM;  Location: [facility] OR;  Service: Podiatry;  Laterality: Left;    SURGICAL REMOVAL OF BUNION WITH OSTEOTOMY OF METATARSAL BONE Right 06/28/2019    Procedure: BUNIONECTOMY, WITH METATARSAL OSTEOTOMY;  Surgeon: Srinivasan Villanueva DPM;  Location: [facility] OR;  Service: Podiatry;  Laterality: Right;    TONSILLECTOMY, ADENOIDECTOMY  1980s    TRIGGER FINGER RELEASE Right 04/01/2015    Dr. Pedraza       Patient's Medications   New Prescriptions    No medications on file   Previous Medications    ACCU-CHEK GUIDE TEST STRIPS STRP    USE TO TEST TWICE A DAY    ALBUTEROL (PROVENTIL/VENTOLIN HFA) 90 MCG/ACTUATION INHALER    INHALE 2 PUFFS INTO THE LUNGS EVERY 4 HOURS AS NEEDED FOR WHEEZING OR SHORTNESS OF BREATH.    BLOOD GLUCOSE CONTROL, HIGH (TRUE METRIX LEVEL 3) SOLN    1 each by Other route once daily.    BLOOD-GLUCOSE METER MISC    1 each by Misc.(Non-Drug; Combo Route) route 2 (two) times a day.    CHOLECALCIFEROL, VITAMIN D3, (VITAMIN D3) 25 MCG (1,000 UNIT) CAPSULE    Take 1,000 Units by mouth once daily.    ESZOPICLONE (LUNESTA) 3 MG TAB    Take 1 tablet (3 mg total) by mouth every evening.    LANCING DEVICE WITH LANCETS (ACCU-CHEK SOFT DEV LANCETS) KIT    1 each by Misc.(Non-Drug; Combo Route) route 2 (two) times a day.    LORAZEPAM (ATIVAN) 1 MG TABLET    Take 1 tablet (1 mg total) by mouth 2  (two) times daily.    LOSARTAN (COZAAR) 25 MG TABLET    TAKE 1 TABLET BY MOUTH ONCE  DAILY    METOPROLOL SUCCINATE (TOPROL-XL) 50 MG 24 HR TABLET    TAKE 1 TABLET BY MOUTH ONCE  DAILY    OMEPRAZOLE (PRILOSEC) 20 MG CAPSULE    TAKE 1 CAPSULE BY MOUTH ONCE  DAILY    SEMAGLUTIDE (OZEMPIC) 2 MG/DOSE (8 MG/3 ML) PNIJ    Inject 2 mg into the skin every 7 days.    TRUEPLUS LANCETS 33 GAUGE MISC    TEST BLOOD SUGAR ONE TIME DAILY   Modified Medications    No medications on file   Discontinued Medications    No medications on file       Family History   Problem Relation Age of Onset    Prostate cancer Brother     Diabetes Maternal Aunt     Diabetes Cousin     Hypertension Maternal Grandmother        Review of patient's allergies indicates:  No Known Allergies        Objective:      Physical Exam  Patient is alert and oriented, no distress. Skin is intact. Neuro is normal with no focal motor or sensory findings.    Limited cervical ROM. + spurling's test    Physical Exam:                       RIGHT                                     LEFT     Scap Dyskinesis/Winging       (-)                                             (-)     Tenderness:                                                                              Greater Tuberosity                  (-)                                            +  Bicipital Groove                       (-)                                             (-)  AC joint                                   (-)                                             (-)  Other:      ROM:  Forward Elevation       160                                          140  Abduction                    120                                          100  ER (at side)                 80                                            50     Strength:   Supraspinatus             5/5                                           4/5  Infraspinatus               5/5                                           4/5  Subscap / IR                5/5 5/5      Special Tests:              Neer:                                       (-)                                             +              Whalen:                                  +                                             +              SS Stress:                                +                                             +              Bear Hug:                                (-)                                             (-)              Caroline's:                                 +                                             +              Resisted Thrower's:                +                                             +              Cross Arm Abduction:             (-)                                             (-)       Neurovascular examination  - Motor grossly intact bilaterally to shoulder abduction, elbow flexion and extension, wrist flexion and extension, and intrinsic hand musculature  - Sensation intact to light touch bilaterally in axillary, median, radial, and ulnar distributions  - Symmetrical radial pulses    Imaging:    XR Results:  Results for orders placed during the hospital encounter of 09/27/23    X-Ray Shoulder 2 or more views Bilat    Narrative  EXAM: XR SHOULDER COMPLETE 2 OR MORE VIEWS BILATERAL    CLINICAL HISTORY: [Pain]    FINDINGS: 8 views bilateral shoulders.  Mild AC joint degenerative changes.  No evidence of acute fracture or dislocation.  Normal mineralization.  Soft tissues are unremarkable.    Impression  No acute findings.    Finalized on: 9/27/2023 10:07 AM By:  Sergei Mcclure MD  [facility]# [id number]      2023-09-27 10:09:[id number]    [facility]      MRI Results:  MRI Shoulder Without Contrast Left  Narrative: EXAM:  MRI SHOULDER WITHOUT CONTRAST LEFT    CLINICAL HISTORY: Left shoulder pain. Shoulder pain, rotator cuff disorder suspected, xray done; [M25.512]-Pain in left shoulder./[G89.29]-Other chronic pain.    TECHNIQUE: Standard  multiplanar, multisequence MR imaging protocol of the left shoulder was performed.    FINDINGS:  ROTATOR CUFF: Moderate to severe supraspinatus, infraspinatus and subscapularis tendinosis.  Tiny low-grade partial interstitial tear of the posterior supraspinatus tendon insertion at the footprint. Small high-grade partial articular surface tear of the subscapularis tendon insertion.  No other rotator cuff tear identified.    MUSCULATURE: Within normal limits.    BURSITIS: Mild subcoracoid bursitis.    LONG HEAD BICEPS TENDON: Intact and normal in position.    LABRUM and GLENOHUMERAL LIGAMENTS: No discernible tears.    MARROW: No fractures, marrow edema or suspicious bone lesions.    GLENOHUMERAL JOINT: No full-thickness chondral defects.  Normal alignment.  Small joint effusion.    AC JOINT: Mild  osteoarthritis with capsular hypertrophy and small marginal osteophytes.  Normal alignment.    ACROMIAL ARCH: Type 1 acromion with minor anterior downsloping.  No os acromiale.  Impression: 1.  Significant rotator cuff tendinosis.  Tiny low-grade interstitial tear supraspinatus tendon insertion.  Small high-grade partial articular surface tear subscapularis tendon insertion.  Mild subcoracoid bursitis.  2.  Mild acromioclavicular osteoarthritis.  Glenohumeral joint effusion.  Minimally downsloping acromion.    Finalized on: 11/20/2023 9:18 AM By:  Dmitri Helm MD  [facility]# [id number]      2023-11-20 09:20:55.740    [facility]      CT Results:  No results found for this or any previous visit.      Physician read: I agree with the above impression.    Assessment/Plan:   Cassandra Campos is a 73 y.o. female with chronic left shoulder rotator cuff tendinosis, right rotator cuff tendinosis, cervical radiculopathy.      Plan:    She has left shoulder rotator cuff tendinosis without any significant tear as evidenced on prior MRI and symptoms and exam findings consistent with right shoulder rotator cuff tendinosis as well.   Again reviewed  MRI which shows no significant rotator cuff tear that I think would benefit from operative intervention  I recommend proceeding with left and right shoulder corticosteroid injection into the subacromial space. The patient is in agreement with this plan.   Procedure performed today and patient tolerated the procedure well with no immediate complications.   Follow up as needed          Francesco Palma MD    I, Carlos Farley, acted as a scribe for Francesco Palma MD for the duration of this office visit.

## 2024-02-07 NOTE — PROCEDURES
Large Joint Aspiration/Injection: R subacromial bursa    Date/Time: 2/7/2024 7:45 AM    Performed by: Francesco Palma MD  Authorized by: Francesco Palma MD    Consent Done?:  Yes (Verbal)  Indications:  Pain  Site marked: the procedure site was marked    Timeout: prior to procedure the correct patient, procedure, and site was verified    Prep: patient was prepped and draped in usual sterile fashion      Local anesthesia used?: Yes    Anesthesia:  Local infiltration  Local anesthetic:  Lidocaine 1% without epinephrine    Details:  Needle Size:  22 G  Ultrasonic Guidance for needle placement?: No    Approach:  Posterior  Location:  Shoulder  Site:  R subacromial bursa  Medications:  40 mg triamcinolone acetonide 40 mg/mL  Patient tolerance:  Patient tolerated the procedure well with no immediate complications  Large Joint Aspiration/Injection: L subacromial bursa    Date/Time: 2/7/2024 7:45 AM    Performed by: Francesoc Palma MD  Authorized by: Francesco Palma MD    Consent Done?:  Yes (Verbal)  Indications:  Pain  Site marked: the procedure site was marked    Timeout: prior to procedure the correct patient, procedure, and site was verified    Prep: patient was prepped and draped in usual sterile fashion      Local anesthesia used?: Yes    Anesthesia:  Local infiltration  Local anesthetic:  Lidocaine 1% without epinephrine    Details:  Needle Size:  22 G  Ultrasonic Guidance for needle placement?: No    Approach:  Posterior  Location:  Shoulder  Site:  L subacromial bursa  Medications:  40 mg triamcinolone acetonide 40 mg/mL  Patient tolerance:  Patient tolerated the procedure well with no immediate complications

## 2024-02-08 ENCOUNTER — TELEPHONE (OUTPATIENT)
Dept: PREADMISSION TESTING | Facility: HOSPITAL | Age: 74
End: 2024-02-08
Payer: MEDICARE

## 2024-02-08 NOTE — TELEPHONE ENCOUNTER
Called and spoke with Pt about the following:     Please arrive to [facility] ([facility] at 6:00 am on 2/09/24 for your scheduled procedure.  Address: [address] [place] [address] [place]. [address] ([address] on left, [address] Floor Lobby)  >>>NO eating or drinking after midnight unless instructed otherwise by your Surgeon<<<

## 2024-02-09 ENCOUNTER — HOSPITAL ENCOUNTER (OUTPATIENT)
Facility: HOSPITAL | Age: 74
Discharge: HOME | End: 2024-02-09
Attending: ORTHOPAEDIC SURGERY | Admitting: ORTHOPAEDIC SURGERY
Payer: MEDICARE

## 2024-02-09 ENCOUNTER — ANESTHESIA (OUTPATIENT)
Dept: SURGERY | Facility: HOSPITAL | Age: 74
End: 2024-02-09
Payer: MEDICARE

## 2024-02-09 DIAGNOSIS — Z01.818 PREOP TESTING: ICD-10-CM

## 2024-02-09 DIAGNOSIS — G56.01 RIGHT CARPAL TUNNEL SYNDROME: ICD-10-CM

## 2024-02-09 LAB — POCT GLUCOSE: 106 MG/DL (ref 70–110)

## 2024-02-09 PROCEDURE — 37000009 HC ANESTHESIA EA ADD 15 MINS: Performed by: ORTHOPAEDIC SURGERY

## 2024-02-09 PROCEDURE — 36000706: Performed by: ORTHOPAEDIC SURGERY

## 2024-02-09 PROCEDURE — 63600175 PHARM REV CODE 636 W HCPCS

## 2024-02-09 PROCEDURE — 71000033 HC RECOVERY, INTIAL HOUR: Performed by: ORTHOPAEDIC SURGERY

## 2024-02-09 PROCEDURE — 25000003 PHARM REV CODE 250: Performed by: FAMILY MEDICINE

## 2024-02-09 PROCEDURE — 37000008 HC ANESTHESIA 1ST 15 MINUTES: Performed by: ORTHOPAEDIC SURGERY

## 2024-02-09 PROCEDURE — 64721 CARPAL TUNNEL SURGERY: CPT | Mod: RT,,, | Performed by: ORTHOPAEDIC SURGERY

## 2024-02-09 PROCEDURE — 63600175 PHARM REV CODE 636 W HCPCS: Performed by: FAMILY MEDICINE

## 2024-02-09 PROCEDURE — 36000707: Performed by: ORTHOPAEDIC SURGERY

## 2024-02-09 PROCEDURE — 25000003 PHARM REV CODE 250: Performed by: ORTHOPAEDIC SURGERY

## 2024-02-09 PROCEDURE — 71000015 HC POSTOP RECOV 1ST HR: Performed by: ORTHOPAEDIC SURGERY

## 2024-02-09 RX ORDER — SODIUM CHLORIDE 0.9 % (FLUSH) 0.9 %
10 SYRINGE (ML) INJECTION
Status: DISCONTINUED | OUTPATIENT
Start: 2024-02-09 | End: 2024-02-09 | Stop reason: HOSPADM

## 2024-02-09 RX ORDER — HYDROCODONE BITARTRATE AND ACETAMINOPHEN 5; 325 MG/1; MG/1
1 TABLET ORAL EVERY 6 HOURS PRN
Qty: 15 TABLET | Refills: 0 | Status: SHIPPED | OUTPATIENT
Start: 2024-02-09 | End: 2024-04-01

## 2024-02-09 RX ORDER — HYDROCODONE BITARTRATE AND ACETAMINOPHEN 5; 325 MG/1; MG/1
1 TABLET ORAL EVERY 4 HOURS PRN
Status: CANCELLED | OUTPATIENT
Start: 2024-02-09

## 2024-02-09 RX ORDER — FENTANYL CITRATE 50 UG/ML
INJECTION, SOLUTION INTRAMUSCULAR; INTRAVENOUS
Status: DISCONTINUED | OUTPATIENT
Start: 2024-02-09 | End: 2024-02-09

## 2024-02-09 RX ORDER — CHLORHEXIDINE GLUCONATE ORAL RINSE 1.2 MG/ML
10 SOLUTION DENTAL
Status: DISCONTINUED | OUTPATIENT
Start: 2024-02-09 | End: 2024-02-22

## 2024-02-09 RX ORDER — LIDOCAINE HYDROCHLORIDE 20 MG/ML
INJECTION, SOLUTION EPIDURAL; INFILTRATION; INTRACAUDAL; PERINEURAL
Status: DISCONTINUED | OUTPATIENT
Start: 2024-02-09 | End: 2024-02-09

## 2024-02-09 RX ORDER — CEFAZOLIN SODIUM 2 G/50ML
2 SOLUTION INTRAVENOUS
Status: COMPLETED | OUTPATIENT
Start: 2024-02-09 | End: 2024-02-09

## 2024-02-09 RX ORDER — PROPOFOL 10 MG/ML
VIAL (ML) INTRAVENOUS
Status: DISCONTINUED | OUTPATIENT
Start: 2024-02-09 | End: 2024-02-09

## 2024-02-09 RX ORDER — OXYCODONE AND ACETAMINOPHEN 5; 325 MG/1; MG/1
1 TABLET ORAL
Status: DISCONTINUED | OUTPATIENT
Start: 2024-02-09 | End: 2024-02-09 | Stop reason: HOSPADM

## 2024-02-09 RX ORDER — CHLORHEXIDINE GLUCONATE ORAL RINSE 1.2 MG/ML
10 SOLUTION DENTAL 2 TIMES DAILY
Status: CANCELLED | OUTPATIENT
Start: 2024-02-09 | End: 2024-02-14

## 2024-02-09 RX ORDER — HYDROMORPHONE HYDROCHLORIDE 2 MG/ML
0.2 INJECTION, SOLUTION INTRAMUSCULAR; INTRAVENOUS; SUBCUTANEOUS EVERY 5 MIN PRN
Status: DISCONTINUED | OUTPATIENT
Start: 2024-02-09 | End: 2024-02-09 | Stop reason: HOSPADM

## 2024-02-09 RX ORDER — LIDOCAINE HYDROCHLORIDE 20 MG/ML
INJECTION, SOLUTION EPIDURAL; INFILTRATION; INTRACAUDAL; PERINEURAL
Status: DISCONTINUED | OUTPATIENT
Start: 2024-02-09 | End: 2024-02-09 | Stop reason: HOSPADM

## 2024-02-09 RX ORDER — ONDANSETRON HYDROCHLORIDE 2 MG/ML
4 INJECTION, SOLUTION INTRAVENOUS DAILY PRN
Status: DISCONTINUED | OUTPATIENT
Start: 2024-02-09 | End: 2024-02-09 | Stop reason: HOSPADM

## 2024-02-09 RX ADMIN — FENTANYL CITRATE 50 MCG: 50 INJECTION, SOLUTION INTRAMUSCULAR; INTRAVENOUS at 07:02

## 2024-02-09 RX ADMIN — CEFAZOLIN SODIUM 2 G: 2 SOLUTION INTRAVENOUS at 07:02

## 2024-02-09 RX ADMIN — PROPOFOL 50 MG: 10 INJECTION, EMULSION INTRAVENOUS at 07:02

## 2024-02-09 RX ADMIN — LIDOCAINE HYDROCHLORIDE 50 MG: 20 INJECTION, SOLUTION EPIDURAL; INFILTRATION; INTRACAUDAL; PERINEURAL at 07:02

## 2024-02-09 RX ADMIN — SODIUM CHLORIDE, SODIUM LACTATE, POTASSIUM CHLORIDE, AND CALCIUM CHLORIDE: .6; .31; .03; .02 INJECTION, SOLUTION INTRAVENOUS at 07:02

## 2024-02-09 NOTE — DISCHARGE SUMMARY
[facility] - Surgery (Hospital)  Discharge Note  Short Stay    Procedure(s) (LRB):  RELEASE, CARPAL TUNNEL (Right) with median nerve epineural neural lysis      OUTCOME: Patient tolerated treatment/procedure well without complication and is now ready for discharge.    DISPOSITION: Home or Self Care    FINAL DIAGNOSIS:  Recurrent right carpal tunnel syndrome    FOLLOWUP: In clinic    DISCHARGE INSTRUCTIONS:    Discharge Procedure Orders   Basic Metabolic Panel   Standing Status: Future Number of Occurrences: 1 Standing Exp. Date: 03/31/25     CBC Auto Differential   Standing Status: Future Number of Occurrences: 1 Standing Exp. Date: 03/31/25     EKG 12-lead   Standing Status: Future Number of Occurrences: 1 Standing Exp. Date: 01/31/25        TIME SPENT ON DISCHARGE:  20 minutes

## 2024-02-09 NOTE — TRANSFER OF CARE
"Anesthesia Transfer of Care Note    Patient: Cassandra Campos    Procedure(s) Performed: Procedure(s) (LRB):  RELEASE, CARPAL TUNNEL (Right)    Patient location: PACU    Anesthesia Type: MAC    Transport from OR: Transported from OR on room air with adequate spontaneous ventilation    Post pain: adequate analgesia    Post assessment: no apparent anesthetic complications    Post vital signs: stable    Level of consciousness: awake and responds to stimulation    Nausea/Vomiting: no nausea/vomiting    Complications: none    Transfer of care protocol was followed      Last vitals: Visit Vitals  /81 (BP Location: Right arm, Patient Position: Sitting)   Pulse 90   Temp 36.3 °C (97.3 °F) (Temporal)   Resp 18   Ht 5' 3" (1.6 m)   Wt 79.1 kg (174 lb 6.1 oz)   SpO2 96%   Breastfeeding No   BMI 30.89 kg/m²     "

## 2024-02-09 NOTE — OP NOTE
[facility] - Surgery ([facility])  Orthopedic Surgery  Operative Note    SUMMARY     Date of Procedure: 2/9/2024   Assistant: None    Procedure: Procedure(s) (LRB):  RELEASE, CARPAL TUNNEL (Right)   and median nerve epineural neural lysis    Surgeon(s) and Role:     * Jaime Oswald MD - Primary    Assisting Surgeon: None    Pre-Operative Diagnosis: Carpal tunnel syndrome right, recurrent [G56.03]    Post-Operative Diagnosis:  Recurrent right carpal tunnel syndrome    Anesthesia:  Local with sedation    Technical Procedures Used:  right carpal tunnel release with epineural neural lysis median nerve    Description of the Findings of the Procedure:  The patient was taken to the operating room where 10 cc of 2% plain lidocaine was used for local anesthetic and was administered.  After exsanguination of the extremity a proximal tourniquet was inflated to 250 mm of mercury.  Satisfactory anesthesia had been achieved the right hand was prepped and draped in the usual sterile fashion.  The patient did receive 2 g of Ancef intravenously preoperatively.  At this time a longitudinal incision was made in line with the 4th ray over the transverse carpal ligament and extended about 2 in proximal to the flexor crease of the wrist in a zigzag manner.  The skin flaps were elevated and a ulnar to the palmaris longus tendon incision was made in the volar antebrachial fascia.  The transverse carpal ligament was sharply incised under direct vision.  A complete release was verified proximally and distally  The median nerve was identified.  The recurrent motor branch was identified and penetrated the radial transverse carpal ligament.  This was carefully avoided but a epineural neural lysis of the median nerve was performed circumferentially.  The transverse carpal ligament was identified and sharply incised under direct vision.  A complete release was performed and verified by the surgeon's small finger both proximally and distally.   No additional pathology was encountered.  Satisfactory release had been achieved subcutaneous tissue was closed using 4-0 Vicryl suture. Skin was closed using horizontal mattress 4 0 nylon suture.  Xeroform gauze 4x4s and 2 ABD pads were over wrapped with 3 in gauze dressing.  The patient tolerated the procedure well and was transferred to the recovery room in satisfactory condition.      Complications: No    Estimated Blood Loss (EBL): 5cc                        Condition: Good    Disposition: PACU - hemodynamically stable.    Attestation: I was present and scrubbed for the entire procedure.

## 2024-02-09 NOTE — ANESTHESIA PREPROCEDURE EVALUATION
02/09/2024  Cassandra Campos is a 73 y.o., female    Patient Active Problem List   Diagnosis    Hyperlipidemia associated with type 2 diabetes mellitus    Insomnia    Hip arthritis    Obesity    Paroxysmal SVT (supraventricular tachycardia)    GERD (gastroesophageal reflux disease)    Prepatellar bursitis of right knee    Transient synovitis of right knee    Chondromalacia of right knee    Primary hypertension    Calcified granuloma of lung    Osteopenia    Onychomycosis of multiple toenails with type 2 diabetes mellitus    Cough    Controlled type 2 diabetes mellitus without complication, without long-term current use of insulin    Secondary hypertension    Unequal blood pressures in paired extremities    De Quervain's disease (radial styloid tenosynovitis)    Breast screening    Tenosynovitis, de Quervain    Skin tag    Panic attack as reaction to stress    Mild intermittent asthma    BMI 31.0-31.9,adult    Elevated liver enzymes    Iron deficiency    Severe obesity (BMI 35.0-39.9) with comorbidity    Aortic atherosclerosis    Anxiety    Bilateral carpal tunnel syndrome     Past Medical History:   Diagnosis Date    Arthritis     hands    Bilateral bunions     Borderline glaucoma     De Quervain's disease (radial styloid tenosynovitis)     Gastritis     upper GI 2/2017    Hydradenitis     Hyperlipidemia     Hypertension     Insomnia     Migraines 02/01/2000    Nasal septum perforation     Obesity     Pneumonia     Restrictive airway disease     Sleep apnea     SVT (supraventricular tachycardia) 09/2013    Trigger finger     Type 2 diabetes mellitus 2012     am 02/01/2024     Past Surgical History:   Procedure Laterality Date    AXILLARY HIDRADENITIS EXCISION Bilateral     BONE EXOSTOSIS EXCISION Right 07/25/2018    Procedure: EXCISION, EXOSTOSIS;  Surgeon: Jayro Pedraza Sr., MD;  Location: [facility] OR;   Service: Orthopedics;  Laterality: Right;    BREAST BIOPSY Bilateral     both benign    BREAST SURGERY  1998    CARPAL TUNNEL RELEASE      bilateral    CATARACT EXTRACTION Bilateral     OU     SECTION  1979    CHOLECYSTECTOMY  2014    COLONOSCOPY N/A 10/02/2020    Procedure: COLONOSCOPY;  Surgeon: Tushar Edwards MD;  Location: [facility];  Service: Endoscopy;  Laterality: N/A;    COLONOSCOPY W/ POLYPECTOMY  10/02/2020    Polyps x3, repeat 5 years; Tushar Edwards MD     CYST REMOVAL  2015    sebaceous cyst removed from face    DE QUERVAIN'S RELEASE Left 2020    Procedure: RELEASE, HAND, FOR DEQUERVAIN'S TENOSYNOVITIS;  Surgeon: Jaime Oswald MD;  Location: [facility];  Service: Orthopedics;  Laterality: Left;    DE QUERVAIN'S RELEASE Right 2020    Procedure: RELEASE, HAND, FOR DEQUERVAIN'S TENOSYNOVITIS;  Surgeon: Jaime Oswald MD;  Location: [facility];  Service: Orthopedics;  Laterality: Right;    EYE SURGERY      gastric sleeve  2017    Dr. Watson    KNEE SURGERY Right     OLECRANON BURSECTOMY Right 2018    Procedure: BURSECTOMY, OLECRANON;  Surgeon: Jayro Pedraza Sr., MD;  Location: [facility];  Service: Orthopedics;  Laterality: Right;    SURGICAL REMOVAL OF BUNION WITH OSTEOTOMY OF METATARSAL BONE Left 05/10/2019    Procedure: BUNIONECTOMY, WITH METATARSAL OSTEOTOMY;  Surgeon: Srinivasan Villanueva DPM;  Location: [facility];  Service: Podiatry;  Laterality: Left;    SURGICAL REMOVAL OF BUNION WITH OSTEOTOMY OF METATARSAL BONE Right 2019    Procedure: BUNIONECTOMY, WITH METATARSAL OSTEOTOMY;  Surgeon: Srinivasan Villanueva DPM;  Location: [facility];  Service: Podiatry;  Laterality: Right;    TONSILLECTOMY, ADENOIDECTOMY  1980s    TRIGGER FINGER RELEASE Right 2015    Dr. Pedraza       Chemistry        Component Value Date/Time     2024 1410    K 4.1 2024 1410     2024 1410    CO2 26 2024 1410    BUN 7 (L) 2024 1410     CREATININE 0.7 02/01/2024 1410    GLU 78 02/01/2024 1410        Component Value Date/Time    CALCIUM 9.7 02/01/2024 1410    ALKPHOS 110 12/27/2023 1441    AST 26 12/27/2023 1441    ALT 58 (H) 12/27/2023 1441    BILITOT 0.2 12/27/2023 1441    ESTGFRAFRICA >60 06/21/2022 0935    EGFRNONAA >60 06/21/2022 0935        Lab Results   Component Value Date    WBC 7.12 02/01/2024    HGB 13.9 02/01/2024    HCT 41.8 02/01/2024    MCV 76 (L) 02/01/2024     02/01/2024       Pre-op Assessment    I have reviewed the Patient Summary Reports.    I have reviewed the NPO Status.   I have reviewed the Medications.     Review of Systems  Anesthesia Hx:  No problems with previous Anesthesia   History of prior surgery of interest to airway management or planning:  Previous anesthesia: General, MAC          Denies Personal Hx of Anesthesia complications.                    Social:  Non-Smoker       Hematology/Oncology:  Hematology Normal   Oncology Normal                                   Cardiovascular:     Hypertension              ECG has been reviewed. Normal sinus rhythm   Left axis deviation   Minimal voltage criteria for LVH, may be normal variant ( [place] product )   Abnormal ECG   When compared with ECG of 19-NOV-2020 16:10,   No significant change was found   Confirmed by TY MARES, LAM ([id number]) on 2/1/2024 11:21:17 PM                            Pulmonary:  Pneumonia  Asthma mild   Sleep Apnea                Renal/:  Renal/ Normal                 Hepatic/GI:     GERD             Musculoskeletal:  Arthritis   Bilateral CTS            Neurological:      Headaches                                 Endocrine:  Diabetes, type 2   BMI 31      Obesity / BMI > 30      Physical Exam  General: Well nourished, Cooperative, Alert and Oriented    Airway:  Mallampati: II   Mouth Opening: Normal  TM Distance: Normal  Tongue: Normal  Neck ROM: Normal ROM    Dental:  Intact        Anesthesia Plan  Type of Anesthesia, risks & benefits  discussed:    Anesthesia Type: MAC  Intra-op Monitoring Plan: Standard ASA Monitors  Post Op Pain Control Plan: multimodal analgesia and IV/PO Opioids PRN  Induction:  IV  Informed Consent: Informed consent signed with the Patient and all parties understand the risks and agree with anesthesia plan.  All questions answered.   ASA Score: 3  Day of Surgery Review of History & Physical: H&P Update referred to the surgeon/provider.    Ready For Surgery From Anesthesia Perspective.     .

## 2024-02-09 NOTE — PLAN OF CARE
Patient  ready for surgery. Family at bedside. Belongings given to family to secure. Patient instructed on Preop Process verbalizes understanding.

## 2024-02-12 ENCOUNTER — PATIENT MESSAGE (OUTPATIENT)
Dept: INTERNAL MEDICINE | Facility: CLINIC | Age: 74
End: 2024-02-12
Payer: MEDICARE

## 2024-02-12 VITALS
HEIGHT: 63 IN | DIASTOLIC BLOOD PRESSURE: 71 MMHG | WEIGHT: 174.38 LBS | RESPIRATION RATE: 16 BRPM | SYSTOLIC BLOOD PRESSURE: 145 MMHG | TEMPERATURE: 99 F | OXYGEN SATURATION: 95 % | BODY MASS INDEX: 30.9 KG/M2 | HEART RATE: 83 BPM

## 2024-02-12 DIAGNOSIS — Z79.4 CONTROLLED TYPE 2 DIABETES MELLITUS WITHOUT COMPLICATION, WITH LONG-TERM CURRENT USE OF INSULIN: ICD-10-CM

## 2024-02-12 DIAGNOSIS — E11.9 CONTROLLED TYPE 2 DIABETES MELLITUS WITHOUT COMPLICATION, WITH LONG-TERM CURRENT USE OF INSULIN: ICD-10-CM

## 2024-02-12 NOTE — ANESTHESIA POSTPROCEDURE EVALUATION
Anesthesia Post Evaluation    Patient: Cassandra Campos    Procedure(s) Performed: Procedure(s) (LRB):  RELEASE, CARPAL TUNNEL (Right)    Final Anesthesia Type: MAC      Patient location during evaluation: PACU  Patient participation: Yes- Able to Participate  Level of consciousness: awake and alert  Post-procedure vital signs: reviewed and stable  Airway patency: patent      Anesthetic complications: no      Cardiovascular status: blood pressure returned to baseline  Respiratory status: unassisted and spontaneous ventilation  Hydration status: euvolemic  Follow-up not needed.              Vitals Value Taken Time   /71 02/09/24 0820   Temp 37 °C (98.6 °F) 02/09/24 0800   Pulse 88 02/09/24 0821   Resp 44 02/09/24 0821   SpO2 95 % 02/09/24 0821   Vitals shown include unvalidated device data.      Event Time   Out of Recovery 08:22:30         Pain/Francheska Score: No data recorded

## 2024-02-12 NOTE — TELEPHONE ENCOUNTER
No care due was identified.  Health [facility] Embedded Care Due Messages. Reference number: [id number].   2/12/2024 3:32:26 PM CST

## 2024-02-13 ENCOUNTER — HOSPITAL ENCOUNTER (OUTPATIENT)
Dept: RADIOLOGY | Facility: HOSPITAL | Age: 74
Discharge: HOME | End: 2024-02-13
Attending: FAMILY MEDICINE
Payer: MEDICARE

## 2024-02-13 ENCOUNTER — TELEPHONE (OUTPATIENT)
Dept: ORTHOPEDICS | Facility: CLINIC | Age: 74
End: 2024-02-13

## 2024-02-13 ENCOUNTER — OFFICE VISIT (OUTPATIENT)
Dept: ORTHOPEDICS | Facility: CLINIC | Age: 74
End: 2024-02-13
Payer: MEDICARE

## 2024-02-13 ENCOUNTER — OFFICE VISIT (OUTPATIENT)
Dept: INTERNAL MEDICINE | Facility: CLINIC | Age: 74
End: 2024-02-13
Payer: MEDICARE

## 2024-02-13 VITALS
HEIGHT: 63 IN | WEIGHT: 177 LBS | DIASTOLIC BLOOD PRESSURE: 74 MMHG | SYSTOLIC BLOOD PRESSURE: 132 MMHG | BODY MASS INDEX: 31.36 KG/M2 | TEMPERATURE: 98 F | BODY MASS INDEX: 31.48 KG/M2 | HEIGHT: 63 IN | OXYGEN SATURATION: 96 % | HEART RATE: 103 BPM | WEIGHT: 177.69 LBS

## 2024-02-13 DIAGNOSIS — R10.2 PELVIC PAIN: ICD-10-CM

## 2024-02-13 DIAGNOSIS — I70.0 AORTIC ATHEROSCLEROSIS: ICD-10-CM

## 2024-02-13 DIAGNOSIS — R91.1 SOLITARY PULMONARY NODULE: ICD-10-CM

## 2024-02-13 DIAGNOSIS — Z98.890 S/P CARPAL TUNNEL RELEASE: Primary | ICD-10-CM

## 2024-02-13 DIAGNOSIS — J84.10 CALCIFIED GRANULOMA OF LUNG: ICD-10-CM

## 2024-02-13 DIAGNOSIS — R22.2 ABDOMINAL WALL MASS: ICD-10-CM

## 2024-02-13 DIAGNOSIS — E78.5 HYPERLIPIDEMIA ASSOCIATED WITH TYPE 2 DIABETES MELLITUS: ICD-10-CM

## 2024-02-13 DIAGNOSIS — I47.10 PAROXYSMAL SVT (SUPRAVENTRICULAR TACHYCARDIA): ICD-10-CM

## 2024-02-13 DIAGNOSIS — E11.69 HYPERLIPIDEMIA ASSOCIATED WITH TYPE 2 DIABETES MELLITUS: ICD-10-CM

## 2024-02-13 DIAGNOSIS — G47.00 INSOMNIA, UNSPECIFIED TYPE: ICD-10-CM

## 2024-02-13 DIAGNOSIS — R22.2 ABDOMINAL WALL MASS: Primary | ICD-10-CM

## 2024-02-13 DIAGNOSIS — E11.9 CONTROLLED TYPE 2 DIABETES MELLITUS WITHOUT COMPLICATION, WITHOUT LONG-TERM CURRENT USE OF INSULIN: ICD-10-CM

## 2024-02-13 DIAGNOSIS — E66.01 SEVERE OBESITY (BMI 35.0-39.9) WITH COMORBIDITY: ICD-10-CM

## 2024-02-13 DIAGNOSIS — F32.0 MAJOR DEPRESSIVE DISORDER, SINGLE EPISODE, MILD: ICD-10-CM

## 2024-02-13 PROCEDURE — 99999 PR PBB SHADOW E&M-EST. PATIENT-LVL III: CPT | Mod: PBBFAC,,,

## 2024-02-13 PROCEDURE — 99999 PR PBB SHADOW E&M-EST. PATIENT-LVL IV: CPT | Mod: PBBFAC,,, | Performed by: FAMILY MEDICINE

## 2024-02-13 PROCEDURE — 1125F AMNT PAIN NOTED PAIN PRSNT: CPT | Mod: CPTII,S$GLB,,

## 2024-02-13 PROCEDURE — 4010F ACE/ARB THERAPY RXD/TAKEN: CPT | Mod: CPTII,S$GLB,, | Performed by: FAMILY MEDICINE

## 2024-02-13 PROCEDURE — 3288F FALL RISK ASSESSMENT DOCD: CPT | Mod: CPTII,S$GLB,, | Performed by: FAMILY MEDICINE

## 2024-02-13 PROCEDURE — 1159F MED LIST DOCD IN RCRD: CPT | Mod: CPTII,S$GLB,, | Performed by: FAMILY MEDICINE

## 2024-02-13 PROCEDURE — 3008F BODY MASS INDEX DOCD: CPT | Mod: CPTII,S$GLB,, | Performed by: FAMILY MEDICINE

## 2024-02-13 PROCEDURE — 4010F ACE/ARB THERAPY RXD/TAKEN: CPT | Mod: CPTII,S$GLB,,

## 2024-02-13 PROCEDURE — 3078F DIAST BP <80 MM HG: CPT | Mod: CPTII,S$GLB,, | Performed by: FAMILY MEDICINE

## 2024-02-13 PROCEDURE — 1100F PTFALLS ASSESS-DOCD GE2>/YR: CPT | Mod: CPTII,S$GLB,, | Performed by: FAMILY MEDICINE

## 2024-02-13 PROCEDURE — 76705 ECHO EXAM OF ABDOMEN: CPT | Mod: 26,,, | Performed by: RADIOLOGY

## 2024-02-13 PROCEDURE — 3075F SYST BP GE 130 - 139MM HG: CPT | Mod: CPTII,S$GLB,, | Performed by: FAMILY MEDICINE

## 2024-02-13 PROCEDURE — 99214 OFFICE O/P EST MOD 30 MIN: CPT | Mod: S$GLB,,, | Performed by: FAMILY MEDICINE

## 2024-02-13 PROCEDURE — 99024 POSTOP FOLLOW-UP VISIT: CPT | Mod: S$GLB,,,

## 2024-02-13 PROCEDURE — 1125F AMNT PAIN NOTED PAIN PRSNT: CPT | Mod: CPTII,S$GLB,, | Performed by: FAMILY MEDICINE

## 2024-02-13 PROCEDURE — 76705 ECHO EXAM OF ABDOMEN: CPT | Mod: TC

## 2024-02-13 PROCEDURE — 3288F FALL RISK ASSESSMENT DOCD: CPT | Mod: CPTII,S$GLB,,

## 2024-02-13 PROCEDURE — 1101F PT FALLS ASSESS-DOCD LE1/YR: CPT | Mod: CPTII,S$GLB,,

## 2024-02-13 PROCEDURE — 1159F MED LIST DOCD IN RCRD: CPT | Mod: CPTII,S$GLB,,

## 2024-02-13 RX ORDER — AMITRIPTYLINE HYDROCHLORIDE 100 MG/1
100 TABLET ORAL NIGHTLY
Qty: 90 TABLET | Refills: 1 | Status: SHIPPED | OUTPATIENT
Start: 2024-02-13

## 2024-02-13 RX ORDER — SEMAGLUTIDE 2.68 MG/ML
2 INJECTION, SOLUTION SUBCUTANEOUS
Qty: 3 ML | Refills: 2 | Status: SHIPPED | OUTPATIENT
Start: 2024-02-13 | End: 2024-05-08 | Stop reason: SDUPTHER

## 2024-02-13 NOTE — TELEPHONE ENCOUNTER
Spoke to the patient an was able to get her rescheduled to 2/21/2024 @ 11:30      ----- Message from Vicenta Lopez sent at 2/13/2024  2:08 PM CST -----  Contact: jeremiah Allen is calling to follow up on appointment that provider needed to change. Please call her back at [phone].          Thanks  DD

## 2024-02-13 NOTE — PROGRESS NOTES
Subjective:       Patient ID: Cassandra Campos is a 73 y.o. female.    Chief Complaint: Follow-up    Three-month follow-up diabetes hypertension anxiety elevated BMI paroxysmal supraventricular tachycardia abdominal swelling pelvic pain.  She has tolerated Ozempic.  She continues to lose weight.  She denies chest pain palpitations shortness breath edema.  She has a mass of the abdomen has been present for quite some time in his painful.  She also has some left-sided pelvic pain.  Review of her record with pulmonary nodule seen on CT lung 2020.  She needs follow-up which is being ordered.  She reports she quit smoking cigarettes during adolescence.  She needs refill on amitriptyline 100 mg daily that helps with anxiety and headaches    Follow-up  Associated symptoms include headaches. Pertinent negatives include no abdominal pain, chest pain, chills, congestion, coughing, fever or weakness.     Review of Systems   Constitutional:  Negative for activity change, chills and fever.   HENT:  Negative for congestion.    Respiratory:  Negative for cough, chest tightness, shortness of breath and wheezing.    Cardiovascular:  Negative for chest pain, palpitations and leg swelling.   Gastrointestinal:  Negative for abdominal distention and abdominal pain.   Genitourinary:  Positive for pelvic pain. Negative for vaginal bleeding.   Neurological:  Positive for headaches. Negative for dizziness, weakness and light-headedness.       Objective:      Physical Exam  Constitutional:       General: She is not in acute distress.     Appearance: She is not ill-appearing or diaphoretic.   Cardiovascular:      Rate and Rhythm: Normal rate and regular rhythm.      Heart sounds: No murmur heard.     No gallop.   Pulmonary:      Effort: Pulmonary effort is normal. No respiratory distress.      Breath sounds: No wheezing, rhonchi or rales.   Abdominal:      General: There is no distension.      Palpations: Abdomen is soft. There is mass.       Comments: She has a tender mass about 3-4 cm in diameter below the umbilicus.  No abdominal distension.   Lymphadenopathy:      Cervical: No cervical adenopathy.   Neurological:      Mental Status: She is alert.   Psychiatric:         Mood and Affect: Mood normal.         Behavior: Behavior normal.         Admission on 02/09/2024, Discharged on 02/09/2024   Component Date Value Ref Range Status    POCT Glucose 02/09/2024 106  70 - 110 mg/dL Final     Assessment:       1. Abdominal wall mass    2. Pelvic pain    3. Controlled type 2 diabetes mellitus without complication, without long-term current use of insulin    4. Insomnia, unspecified type    5. Solitary pulmonary nodule    6. Major depressive disorder, single episode, mild    7. Calcified granuloma of lung    8. Hyperlipidemia associated with type 2 diabetes mellitus    9. Severe obesity (BMI 35.0-39.9) with comorbidity    10. Aortic atherosclerosis    11. Paroxysmal SVT (supraventricular tachycardia)        Plan:     Blood pressure controlled continue Ozempic for diabetes and elevated BMI.  Gyn referral for pelvic pain.  Refill amitriptyline.  CT lung was ordered.  She has a history of calcium carotid granuloma of the lung as well as pulmonary nodule.  She is due for RSV depression is controlled with current medication.  Lipid profile up-to-date and normal.  Continue diet.  Continue Ozempic diet for obesity.  Atherosclerosis aorta is stable and monitored..  Follow-up in 3 months.     Abdominal wall mass  -     US Soft Tissue Abdomen; Future; Expected date: 02/13/2024    Pelvic pain  -     Ambulatory referral/consult to Gynecology; Future; Expected date: 02/20/2024    Controlled type 2 diabetes mellitus without complication, without long-term current use of insulin  -     semaglutide (OZEMPIC) 2 mg/dose (8 mg/3 mL) PnIj; Inject 2 mg into the skin every 7 days.  Dispense: 3 mL; Refill: 2    Insomnia, unspecified type    Solitary pulmonary nodule  -     CT Chest  Without Contrast; Future; Expected date: 02/13/2024    Major depressive disorder, single episode, mild    Calcified granuloma of lung    Hyperlipidemia associated with type 2 diabetes mellitus    Severe obesity (BMI 35.0-39.9) with comorbidity    Aortic atherosclerosis    Paroxysmal SVT (supraventricular tachycardia)    Other orders  -     amitriptyline (ELAVIL) 100 MG tablet; Take 1 tablet (100 mg total) by mouth every evening.  Dispense: 90 tablet; Refill: 1

## 2024-02-13 NOTE — PROGRESS NOTES
SUBJECTIVE:      Patient ID: Cassandra Campos is a 73 y.o. female.    HPI: Ms. Campos is here today for post-operative visit #1.  She is 4 days status post RELEASE, CARPAL TUNNEL (Right) and median nerve epineural neural lysis by Dr. Oswald on 24. She reports that she is doing well.  Pain is 5/10. She is taking the Norco for pain as prescribed.  She has been compliant with postop instructions and keeping the extremity dry. She denies fever, chills, and sweats since the time of the surgery.     Past Medical History:   Diagnosis Date    Arthritis     hands    Bilateral bunions     Borderline glaucoma     De Quervain's disease (radial styloid tenosynovitis)     Gastritis     upper GI 2017    Hydradenitis     Hyperlipidemia     Hypertension     Insomnia     Migraines 2000    Nasal septum perforation     Obesity     Pneumonia     Restrictive airway disease     Sleep apnea     SVT (supraventricular tachycardia) 2013    Trigger finger     Type 2 diabetes mellitus      am 2024     Past Surgical History:   Procedure Laterality Date    AXILLARY HIDRADENITIS EXCISION Bilateral     BONE EXOSTOSIS EXCISION Right 2018    Procedure: EXCISION, EXOSTOSIS;  Surgeon: Jayro Pedraza Sr., MD;  Location: [facility];  Service: Orthopedics;  Laterality: Right;    BREAST BIOPSY Bilateral     both benign    BREAST SURGERY  1998    CARPAL TUNNEL RELEASE      bilateral    CARPAL TUNNEL RELEASE Right 2024    Procedure: RELEASE, CARPAL TUNNEL;  Surgeon: Jaime Oswald MD;  Location: [facility];  Service: Orthopedics;  Laterality: Right;    CATARACT EXTRACTION Bilateral     OU     SECTION  1979    CHOLECYSTECTOMY  2014    COLONOSCOPY N/A 10/02/2020    Procedure: COLONOSCOPY;  Surgeon: Tushar Edwards MD;  Location: [facility];  Service: Endoscopy;  Laterality: N/A;    COLONOSCOPY W/ POLYPECTOMY  10/02/2020    Polyps x3, repeat 5 years; Tushar Edwards MD     CYST REMOVAL  2015     sebaceous cyst removed from face    DE QUERVAIN'S RELEASE Left 2020    Procedure: RELEASE, HAND, FOR DEQUERVAIN'S TENOSYNOVITIS;  Surgeon: Jaime Oswald MD;  Location: [facility];  Service: Orthopedics;  Laterality: Left;    DE QUERVAIN'S RELEASE Right 2020    Procedure: RELEASE, HAND, FOR DEQUERVAIN'S TENOSYNOVITIS;  Surgeon: Jaime Oswald MD;  Location: [facility];  Service: Orthopedics;  Laterality: Right;    EYE SURGERY      gastric sleeve  2017    Dr. Watson    KNEE SURGERY Right     OLECRANON BURSECTOMY Right 2018    Procedure: BURSECTOMY, OLECRANON;  Surgeon: Jayro Pedraza Sr., MD;  Location: [facility];  Service: Orthopedics;  Laterality: Right;    SURGICAL REMOVAL OF BUNION WITH OSTEOTOMY OF METATARSAL BONE Left 05/10/2019    Procedure: BUNIONECTOMY, WITH METATARSAL OSTEOTOMY;  Surgeon: Srinivasan Villanueva DPM;  Location: [facility];  Service: Podiatry;  Laterality: Left;    SURGICAL REMOVAL OF BUNION WITH OSTEOTOMY OF METATARSAL BONE Right 2019    Procedure: BUNIONECTOMY, WITH METATARSAL OSTEOTOMY;  Surgeon: Srinivasan Villanueva DPM;  Location: [facility];  Service: Podiatry;  Laterality: Right;    TONSILLECTOMY, ADENOIDECTOMY  1980s    TRIGGER FINGER RELEASE Right 2015    Dr. Pedraza     Family History   Problem Relation Age of Onset    Prostate cancer Brother     Diabetes Maternal Aunt     Diabetes Cousin     Hypertension Maternal Grandmother      Social History     Socioeconomic History    Marital status:     Number of children: 1   Occupational History    Occupation:  aid   Tobacco Use    Smoking status: Former     Current packs/day: 0.00     Average packs/day: 0.5 packs/day for 42.0 years (21.0 ttl pk-yrs)     Types: Cigarettes     Start date: 1970     Quit date: 2012     Years since quittin.1     Passive exposure: Past    Smokeless tobacco: Never   Substance and Sexual Activity    Alcohol use: Not Currently     Alcohol/week: 1.0  standard drink of alcohol     Types: 1 Glasses of wine per week     Comment: Glass red wine once a every 2 weeks    Drug use: Never    Sexual activity: Not Currently     Partners: Female     Birth control/protection: Abstinence, None   Social History Narrative    Single, part-time teacher. Masters degree biology.      Social Determinants of Health     Financial Resource Strain: Low Risk  (12/19/2023)    Overall Financial Resource Strain (CARDIA)     Difficulty of Paying Living Expenses: Not hard at all   Food Insecurity: Food Insecurity Present (12/19/2023)    Hunger Vital Sign     Worried About Running Out of Food in the Last Year: Never true     Ran Out of Food in the Last Year: Sometimes true   Transportation Needs: No Transportation Needs (12/19/2023)    PRAPARE - Transportation     Lack of Transportation (Medical): No     Lack of Transportation (Non-Medical): No   Physical Activity: Insufficiently Active (12/19/2023)    Exercise Vital Sign     Days of Exercise per Week: 3 days     Minutes of Exercise per Session: 20 min   Stress: No Stress Concern Present (12/19/2023)    [place] [employer] - Occupational Stress Questionnaire     Feeling of Stress : Not at all   Social Connections: Unknown (12/19/2023)    Social Connection and Isolation Panel [NHANES]     Frequency of Communication with Friends and Family: Never     Frequency of Social Gatherings with Friends and Family: More than three times a week     Active Member of Clubs or Organizations: Patient declined     Attends Club or Organization Meetings: 1 to 4 times per year     Marital Status:    Housing Stability: Low Risk  (12/19/2023)    Housing Stability Vital Sign     Unable to Pay for Housing in the Last Year: No     Number of Places Lived in the Last Year: 1     Unstable Housing in the Last Year: No     Medication List with Changes/Refills   Current Medications    ALBUTEROL (PROVENTIL/VENTOLIN HFA) 90 MCG/ACTUATION INHALER     "INHALE 2 PUFFS INTO THE LUNGS EVERY 4 HOURS AS NEEDED FOR WHEEZING OR SHORTNESS OF BREATH.    AMITRIPTYLINE (ELAVIL) 100 MG TABLET    Take 1 tablet (100 mg total) by mouth every evening.    BLOOD GLUCOSE CONTROL, HIGH (TRUE METRIX LEVEL 3) SOLN    1 each by Other route once daily.    BLOOD SUGAR DIAGNOSTIC (ACCU-CHEK GUIDE TEST STRIPS) STRP    USE TO TEST TWICE A DAY    BLOOD-GLUCOSE METER MISC    1 each by Misc.(Non-Drug; Combo Route) route 2 (two) times a day.    CHOLECALCIFEROL, VITAMIN D3, (VITAMIN D3) 25 MCG (1,000 UNIT) CAPSULE    Take 1,000 Units by mouth once daily.    ESZOPICLONE (LUNESTA) 3 MG TAB    Take 1 tablet (3 mg total) by mouth every evening.    HYDROCODONE-ACETAMINOPHEN (NORCO) 5-325 MG PER TABLET    Take 1 tablet by mouth every 6 (six) hours as needed for Pain.    LANCING DEVICE WITH LANCETS (ACCU-CHEK SOFT DEV LANCETS) KIT    1 each by Misc.(Non-Drug; Combo Route) route 2 (two) times a day.    LORAZEPAM (ATIVAN) 1 MG TABLET    Take 1 tablet (1 mg total) by mouth 2 (two) times daily.    LOSARTAN (COZAAR) 25 MG TABLET    TAKE 1 TABLET BY MOUTH ONCE  DAILY    METOPROLOL SUCCINATE (TOPROL-XL) 50 MG 24 HR TABLET    TAKE 1 TABLET BY MOUTH ONCE  DAILY    OMEPRAZOLE (PRILOSEC) 20 MG CAPSULE    TAKE 1 CAPSULE BY MOUTH ONCE  DAILY    SEMAGLUTIDE (OZEMPIC) 2 MG/DOSE (8 MG/3 ML) PNIJ    Inject 2 mg into the skin every 7 days.    TRUEPLUS LANCETS 33 GAUGE MISC    TEST BLOOD SUGAR ONE TIME DAILY     Review of patient's allergies indicates:  No Known Allergies    OBJECTIVE:     Physical exam:    Vitals:    02/13/24 1055   Weight: 80.3 kg (177 lb)   Height: 5' 3" (1.6 m)   PainSc:   5   PainLoc: Wrist     Vital signs are stable, patient is afebrile.  Patient is well dressed and well groomed, no acute distress.  Alert and oriented to person, place, and time.    Right UE:  Post op dressing taken down.   Incision is clean, dry, and intact.   There is no erythema or exudate. There is no sign of any infection. "   Mild swelling to hand and wrist  Mild-mod ecchymosis locally  She is NVI.   Sutures in place.   2+ pulses noted.  Cap refill <2 seconds  Motor intact to hand    ASSESSMENT         Encounter Diagnosis   Name Primary?    S/P carpal tunnel release Yes            4 days status post RELEASE, CARPAL TUNNEL (Right) and median nerve epineural neural lysis    PLAN:           Cassandra was seen today for pain and post-op evaluation.    Diagnoses and all orders for this visit:    S/P carpal tunnel release      - PO instruction reviewed and provided to patient  - The incision was cleaned with hydrogen peroxide and normal saline. A sterile Band-Aid was applied.   - Patient may clean the incision daily as above.   - she has a brace at home. Patient may use brace as needed for symptomatic relief.    - Patient should notify the office of any signs or symptoms of infection including fevers, erythema, purulent drainage, increasing pain.    - Follow up for suture removal     POST OPERATIVE INSTRUCTIONS - Visit #1    BANDAGES/DRESSING/BATHING:  We have removed the dressing, cleaned your incision, and put on a band-aid at your first post op visit today. You may clean the incision daily as we did today. Keep the incision clean and dry. When showering, you may cover the incision with a plastic bag/umbrella bag.   Do not use Neosporin or other topical ointments or creams on the incision. If you are concerned about any redness or drainage of the incisions, please call the office.    SWELLING  Some swelling of your arm, hand and fingers is normal. The swelling can be decreased by  elevating your arm on a few pillows when lying down. Swelling can be further controlled by cold therapy over the surgical site. Flexion/extension of the fingers (opening and closing your hands) will also help to relieve swelling and prevent stiffness.    ACTIVITY  You may use the hand and wrist as tolerated for light activity. You are encouraged to bend the wrist,  elbow and fingers as soon as the initial surgical dressing is removed. Avoid lifting, pushing, or pulling any object greater than 5-10 pounds for the first 10-14 days.     BRACE  Wear your brace or splint as directed.    MEDICATIONS  Take pain medicines exactly as directed.  If the doctor gave you a prescription medicine for pain, take it as prescribed.  If you are not taking a prescription pain medicine, take an over-the-counter medicine such as acetaminophen (Tylenol), ibuprofen (Advil, Motrin), or naproxen (Aleve) as long as you do not have an allergy or medical condition that prevents you from taking them.  Do not take two or more pain medicines at the same time unless the doctor told you to. Many pain medicines have acetaminophen, which is Tylenol. Too much acetaminophen (Tylenol) can be harmful.    FOLLOW -UP  You should be scheduled for a post-op appointment within the 10-14 days following surgery, at which time we will review your surgery, remove sutures and evaluate incisions, review your post-operative program and answer any of your questions.          Randi Seneca, PA-C [facility]

## 2024-02-14 ENCOUNTER — OFFICE VISIT (OUTPATIENT)
Dept: OBSTETRICS AND GYNECOLOGY | Facility: CLINIC | Age: 74
End: 2024-02-14
Payer: MEDICARE

## 2024-02-14 VITALS
WEIGHT: 177 LBS | DIASTOLIC BLOOD PRESSURE: 68 MMHG | BODY MASS INDEX: 31.36 KG/M2 | HEIGHT: 63 IN | SYSTOLIC BLOOD PRESSURE: 102 MMHG

## 2024-02-14 DIAGNOSIS — R10.32 LEFT GROIN PAIN: Primary | ICD-10-CM

## 2024-02-14 DIAGNOSIS — K42.9 UMBILICAL HERNIA WITHOUT OBSTRUCTION AND WITHOUT GANGRENE: Primary | ICD-10-CM

## 2024-02-14 DIAGNOSIS — R10.2 PELVIC PAIN: ICD-10-CM

## 2024-02-14 PROCEDURE — 3288F FALL RISK ASSESSMENT DOCD: CPT | Mod: CPTII,S$GLB,, | Performed by: NURSE PRACTITIONER

## 2024-02-14 PROCEDURE — 4010F ACE/ARB THERAPY RXD/TAKEN: CPT | Mod: CPTII,S$GLB,, | Performed by: NURSE PRACTITIONER

## 2024-02-14 PROCEDURE — 1101F PT FALLS ASSESS-DOCD LE1/YR: CPT | Mod: CPTII,S$GLB,, | Performed by: NURSE PRACTITIONER

## 2024-02-14 PROCEDURE — 3008F BODY MASS INDEX DOCD: CPT | Mod: CPTII,S$GLB,, | Performed by: NURSE PRACTITIONER

## 2024-02-14 PROCEDURE — 99202 OFFICE O/P NEW SF 15 MIN: CPT | Mod: S$GLB,,, | Performed by: NURSE PRACTITIONER

## 2024-02-14 PROCEDURE — 99999 PR PBB SHADOW E&M-EST. PATIENT-LVL V: CPT | Mod: PBBFAC,,, | Performed by: NURSE PRACTITIONER

## 2024-02-14 PROCEDURE — 3078F DIAST BP <80 MM HG: CPT | Mod: CPTII,S$GLB,, | Performed by: NURSE PRACTITIONER

## 2024-02-14 PROCEDURE — 1160F RVW MEDS BY RX/DR IN RCRD: CPT | Mod: CPTII,S$GLB,, | Performed by: NURSE PRACTITIONER

## 2024-02-14 PROCEDURE — 3074F SYST BP LT 130 MM HG: CPT | Mod: CPTII,S$GLB,, | Performed by: NURSE PRACTITIONER

## 2024-02-14 PROCEDURE — 1125F AMNT PAIN NOTED PAIN PRSNT: CPT | Mod: CPTII,S$GLB,, | Performed by: NURSE PRACTITIONER

## 2024-02-14 PROCEDURE — 1159F MED LIST DOCD IN RCRD: CPT | Mod: CPTII,S$GLB,, | Performed by: NURSE PRACTITIONER

## 2024-02-14 NOTE — PROGRESS NOTES
Subjective:       Patient ID: Cassandra Campos is a 73 y.o. female.    Chief Complaint:  Pelvic Pain    No LMP recorded. Patient is postmenopausal.  History of Present Illness  Over the past two weeks complains of left side groin pain (pretty consistent). Feels the pain when she is sitting walking and sometimes when she is walking   Was seen by Dr. Zhang for abdominal/pelvic pain and pre Dr. Zhang   Ultrasound large umbilical hernia.  Needs referral to surgery for possible treatment   States that she is aware of his recommendations but she would still like a pelvic ultrasound to rule out gynecological issue as the source of pain   OB History    Para Term  AB Living   1 1 1         SAB IAB Ectopic Multiple Live Births                  # Outcome Date GA Lbr Rex/2nd Weight Sex Delivery Anes PTL Lv   1 Term                Review of Systems  Review of Systems        Objective:    Physical Exam  Abdominal:             Assessment:     1. Left groin pain    2. Pelvic pain              Plan:   Cassandra was seen today for pelvic pain.    Diagnoses and all orders for this visit:    Left groin pain  -     US Soft Tissue, Groin Left; Future    Pelvic pain  -     Ambulatory referral/consult to Gynecology  -     Cancel: US Pelvis Complete Non OB; Future      Will rule out gynecological issues

## 2024-02-16 ENCOUNTER — HOSPITAL ENCOUNTER (OUTPATIENT)
Dept: RADIOLOGY | Facility: HOSPITAL | Age: 74
Discharge: HOME | End: 2024-02-16
Attending: FAMILY MEDICINE
Payer: MEDICARE

## 2024-02-16 ENCOUNTER — HOSPITAL ENCOUNTER (OUTPATIENT)
Dept: RADIOLOGY | Facility: HOSPITAL | Age: 74
Discharge: HOME | End: 2024-02-16
Attending: NURSE PRACTITIONER
Payer: MEDICARE

## 2024-02-16 DIAGNOSIS — R91.1 SOLITARY PULMONARY NODULE: ICD-10-CM

## 2024-02-16 DIAGNOSIS — R10.32 LEFT GROIN PAIN: ICD-10-CM

## 2024-02-16 PROCEDURE — 71250 CT THORAX DX C-: CPT | Mod: TC

## 2024-02-16 PROCEDURE — 71250 CT THORAX DX C-: CPT | Mod: 26,,, | Performed by: RADIOLOGY

## 2024-02-16 PROCEDURE — 76882 US LMTD JT/FCL EVL NVASC XTR: CPT | Mod: TC,LT

## 2024-02-16 PROCEDURE — 76882 US LMTD JT/FCL EVL NVASC XTR: CPT | Mod: 26,LT,, | Performed by: RADIOLOGY

## 2024-02-18 DIAGNOSIS — R91.8 PULMONARY NODULES: Primary | ICD-10-CM

## 2024-02-20 NOTE — PROGRESS NOTES
SUBJECTIVE:      Patient ID: Cassandra Campos is a 73 y.o. female.    HPI: Ms. Campos is here today for post-operative visit #2.  She is 12 days status post RELEASE, CARPAL TUNNEL (Right) and median nerve epineural neural lysis by Dr. Oswald on 2/9/24. She reports that she is doing well.  Pain is 3/10.  She is taking tylenol as needed for pain. She is ready to schedule a left carpal tunnel release. She has been compliant with postop instructions and keeping the extremity dry. She denies fever, chills, and sweats since the time of the surgery.     Interval hx 2/13/24: Ms. Campos is here today for post-operative visit #1.  She is 4 days status post RELEASE, CARPAL TUNNEL (Right) and median nerve epineural neural lysis by Dr. Oswald on 2/9/24. She reports that she is doing well.  Pain is 5/10. She is taking the Norco for pain as prescribed.  She has been compliant with postop instructions and keeping the extremity dry. She denies fever, chills, and sweats since the time of the surgery.     Past Medical History:   Diagnosis Date    Arthritis     hands    Bilateral bunions     Borderline glaucoma     De Quervain's disease (radial styloid tenosynovitis)     Gastritis     upper GI 2/2017    Hydradenitis     Hyperlipidemia     Hypertension     Insomnia     Migraines 02/01/2000    Nasal septum perforation     Obesity     Pneumonia     Restrictive airway disease     Sleep apnea     SVT (supraventricular tachycardia) 09/2013    Trigger finger     Type 2 diabetes mellitus 2012     am 02/01/2024     Past Surgical History:   Procedure Laterality Date    AXILLARY HIDRADENITIS EXCISION Bilateral     BONE EXOSTOSIS EXCISION Right 07/25/2018    Procedure: EXCISION, EXOSTOSIS;  Surgeon: Jayro Pedraza Sr., MD;  Location: [facility];  Service: Orthopedics;  Laterality: Right;    BREAST BIOPSY Bilateral     both benign    BREAST SURGERY  07/1998    CARPAL TUNNEL RELEASE      bilateral    CARPAL TUNNEL RELEASE Right 2/9/2024     Procedure: RELEASE, CARPAL TUNNEL;  Surgeon: Jaime Oswald MD;  Location: [facility] OR;  Service: Orthopedics;  Laterality: Right;    CATARACT EXTRACTION Bilateral     OU     SECTION  1979    CHOLECYSTECTOMY  2014    COLONOSCOPY N/A 10/02/2020    Procedure: COLONOSCOPY;  Surgeon: Tushar Edwards MD;  Location: [facility];  Service: Endoscopy;  Laterality: N/A;    COLONOSCOPY W/ POLYPECTOMY  10/02/2020    Polyps x3, repeat 5 years; Tushar Edwards MD     CYST REMOVAL  2015    sebaceous cyst removed from face    DE QUERVAIN'S RELEASE Left 2020    Procedure: RELEASE, HAND, FOR DEQUERVAIN'S TENOSYNOVITIS;  Surgeon: Jaime Oswald MD;  Location: [facility] OR;  Service: Orthopedics;  Laterality: Left;    DE QUERVAIN'S RELEASE Right 2020    Procedure: RELEASE, HAND, FOR DEQUERVAIN'S TENOSYNOVITIS;  Surgeon: Jaime Oswald MD;  Location: [facility] OR;  Service: Orthopedics;  Laterality: Right;    EYE SURGERY      gastric sleeve  2017    Dr. Watson    KNEE SURGERY Right     OLECRANON BURSECTOMY Right 2018    Procedure: BURSECTOMY, OLECRANON;  Surgeon: Jayro Pedraza Sr., MD;  Location: [facility] OR;  Service: Orthopedics;  Laterality: Right;    SURGICAL REMOVAL OF BUNION WITH OSTEOTOMY OF METATARSAL BONE Left 05/10/2019    Procedure: BUNIONECTOMY, WITH METATARSAL OSTEOTOMY;  Surgeon: Srinivasan Villanueva DPM;  Location: [facility] OR;  Service: Podiatry;  Laterality: Left;    SURGICAL REMOVAL OF BUNION WITH OSTEOTOMY OF METATARSAL BONE Right 2019    Procedure: BUNIONECTOMY, WITH METATARSAL OSTEOTOMY;  Surgeon: Srinivasan Villanueva DPM;  Location: [facility] OR;  Service: Podiatry;  Laterality: Right;    TONSILLECTOMY, ADENOIDECTOMY  1980s    TRIGGER FINGER RELEASE Right 2015    Dr. Pedraza     Family History   Problem Relation Age of Onset    Prostate cancer Brother     Diabetes Maternal Aunt     Diabetes Cousin     Hypertension Maternal Grandmother      Social History     Socioeconomic History     Marital status:     Number of children: 1   Occupational History    Occupation:  aid   Tobacco Use    Smoking status: Former     Current packs/day: 0.00     Average packs/day: 0.5 packs/day for 42.0 years (21.0 ttl pk-yrs)     Types: Cigarettes     Start date: 1970     Quit date: 2012     Years since quittin.1     Passive exposure: Past    Smokeless tobacco: Never   Substance and Sexual Activity    Alcohol use: Not Currently     Alcohol/week: 1.0 standard drink of alcohol     Types: 1 Glasses of wine per week     Comment: Glass red wine once a every 2 weeks    Drug use: Never    Sexual activity: Not Currently     Partners: Female     Birth control/protection: Abstinence, None   Social History Narrative    Single, part-time teacher. Masters degree biology.      Social Determinants of Health     Financial Resource Strain: Low Risk  (2023)    Overall Financial Resource Strain (CARDIA)     Difficulty of Paying Living Expenses: Not hard at all   Food Insecurity: Food Insecurity Present (2023)    Hunger Vital Sign     Worried About Running Out of Food in the Last Year: Never true     Ran Out of Food in the Last Year: Sometimes true   Transportation Needs: No Transportation Needs (2023)    PRAPARE - Transportation     Lack of Transportation (Medical): No     Lack of Transportation (Non-Medical): No   Physical Activity: Insufficiently Active (2023)    Exercise Vital Sign     Days of Exercise per Week: 3 days     Minutes of Exercise per Session: 20 min   Stress: No Stress Concern Present (2023)    [employer] - Occupational Stress Questionnaire     Feeling of Stress : Not at all   Social Connections: Unknown (2023)    Social Connection and Isolation Panel [NHANES]     Frequency of Communication with Friends and Family: Never     Frequency of Social Gatherings with Friends and Family: More than three times a week      Active Member of Clubs or Organizations: Patient declined     Attends Club or Organization Meetings: 1 to 4 times per year     Marital Status:    Housing Stability: Low Risk  (12/19/2023)    Housing Stability Vital Sign     Unable to Pay for Housing in the Last Year: No     Number of Places Lived in the Last Year: 1     Unstable Housing in the Last Year: No     Medication List with Changes/Refills   Current Medications    ALBUTEROL (PROVENTIL/VENTOLIN HFA) 90 MCG/ACTUATION INHALER    INHALE 2 PUFFS INTO THE LUNGS EVERY 4 HOURS AS NEEDED FOR WHEEZING OR SHORTNESS OF BREATH.    AMITRIPTYLINE (ELAVIL) 100 MG TABLET    Take 1 tablet (100 mg total) by mouth every evening.    BLOOD GLUCOSE CONTROL, HIGH (TRUE METRIX LEVEL 3) SOLN    1 each by Other route once daily.    BLOOD SUGAR DIAGNOSTIC (ACCU-CHEK GUIDE TEST STRIPS) STRP    USE TO TEST TWICE A DAY    BLOOD-GLUCOSE METER MISC    1 each by Misc.(Non-Drug; Combo Route) route 2 (two) times a day.    CHOLECALCIFEROL, VITAMIN D3, (VITAMIN D3) 25 MCG (1,000 UNIT) CAPSULE    Take 1,000 Units by mouth once daily.    ESZOPICLONE (LUNESTA) 3 MG TAB    Take 1 tablet (3 mg total) by mouth every evening.    HYDROCODONE-ACETAMINOPHEN (NORCO) 5-325 MG PER TABLET    Take 1 tablet by mouth every 6 (six) hours as needed for Pain.    LANCING DEVICE WITH LANCETS (ACCU-CHEK SOFT DEV LANCETS) KIT    1 each by Misc.(Non-Drug; Combo Route) route 2 (two) times a day.    LORAZEPAM (ATIVAN) 1 MG TABLET    Take 1 tablet (1 mg total) by mouth 2 (two) times daily.    LOSARTAN (COZAAR) 25 MG TABLET    TAKE 1 TABLET BY MOUTH ONCE  DAILY    METOPROLOL SUCCINATE (TOPROL-XL) 50 MG 24 HR TABLET    TAKE 1 TABLET BY MOUTH ONCE  DAILY    OMEPRAZOLE (PRILOSEC) 20 MG CAPSULE    TAKE 1 CAPSULE BY MOUTH ONCE  DAILY    RSVPREF3 ANTIGEN-AS01E, PF, (AREXVY, PF,) 120 MCG/0.5 ML SUSR VACCINE    Inject into the muscle.    SEMAGLUTIDE (OZEMPIC) 2 MG/DOSE (8 MG/3 ML) PNIJ    Inject 2 mg into the skin every 7  "days.    TRUEPLUS LANCETS 33 GAUGE MISC    TEST BLOOD SUGAR ONE TIME DAILY     Review of patient's allergies indicates:  No Known Allergies    OBJECTIVE:     Physical exam:    Vitals:    02/21/24 1113   Weight: 80.3 kg (177 lb 0.5 oz)   Height: 5' 3" (1.6 m)   PainSc:   3   PainLoc: Wrist     Vital signs are stable, patient is afebrile.  Patient is well dressed and well groomed, no acute distress.  Alert and oriented to person, place, and time.    Right UE:  Incision is clean, dry, and intact. Mild scabbing noted  There is no erythema or exudate. There is no sign of any infection.   Mild swelling locally  She is NVI.   Sutures in place.   2+ pulses noted.  Cap refill <2 seconds  Motor intact to hand    EMG 1/17/24  IMPRESSION  ABNORMAL study  2. There is electrodiagnostic evidence of a moderate demyelinating median neuropathy (Carpal tunnel syndrome) across the right and a mild demyelinating CTS across the left wrist     ASSESSMENT         Encounter Diagnoses   Name Primary?    S/P carpal tunnel release Yes    Left carpal tunnel syndrome             12 days status post RELEASE, CARPAL TUNNEL (Right) and median nerve epineural neural lysis     PLAN:           Cassandra was seen today for post-op evaluation.    Diagnoses and all orders for this visit:    S/P carpal tunnel release    Left carpal tunnel syndrome      - PO instruction reviewed and provided to patient.   - Dr. Oswald also saw the patient face to face and counseled her on a left carpal tunnel release. Risks complications and alternatives were discussed including the risk of infection, anesthetic risk, injury to nerves and vessels, loss of motion, and possible need for additional surgeries were discussed. She seems to understand and agree that surgery. All questions were answered.   - The incision was cleaned with hydrogen peroxide. Sutures removed with no difficulty. Steri-Strips applied.   - Patient may use brace as needed for symptomatic relief.    - " Patient should notify the office of any signs or symptoms of infection including fevers, erythema, purulent drainage, increasing pain.    - Follow up PRN     POST OPERATIVE VISIT INSTRUCTIONS - Visit #2    1. No soaking the incision for at least 7-10 days. You may get it wet in the shower and use regular soap.    2. To avoid a hard, painful scar, we recommend you use Mederma and/or Silicone Scar patches. You may also use Cocoa Butter, Vitamin E oil, Coconut oil, etc.    You may start this 5-7 days after stitches are removed and when wound is completely closed.    - Mederma scar cream: scar massage over incision 1-2 times per day. You may use topical lotion or Vitamin E oil. Massage an additional few times a day to help the scar become soft and less sensitive.    - Silicone Scar Patches: cut and place over the incision, then massage over the patch to help with scarring and sensitivity. Can be reused up to 10 days if you rinse it clean, let it dry out, then reapply.    Brands: Mepiform Silicone Scar Sheets (recommended), Scar Away, [employer] brand or generic pharmacy brand ([employer]obubble, "[employer])    3. Continue range of motion exercises as instructed at todays visit.    4. You may take Tylenol 500mg and/or Ibuprofen 400mg every 4-6 hours with food for pain as tolerated as long as you do not have an allergy or medical condition that prevents you from taking them.    5. Therapy recommended: none at this time    6. Immobilization: brace as needed    7. Lifting restrictions: start light at about 10lb and increase gradually as tolerated    8. Follow up: prn         Randi Seneca, PA-C [facility]

## 2024-02-21 ENCOUNTER — OFFICE VISIT (OUTPATIENT)
Dept: ORTHOPEDICS | Facility: CLINIC | Age: 74
End: 2024-02-21
Payer: MEDICARE

## 2024-02-21 VITALS — HEIGHT: 63 IN | WEIGHT: 177 LBS | BODY MASS INDEX: 31.36 KG/M2

## 2024-02-21 DIAGNOSIS — Z98.890 S/P CARPAL TUNNEL RELEASE: Primary | ICD-10-CM

## 2024-02-21 DIAGNOSIS — G56.03 CARPAL TUNNEL SYNDROME ON BOTH SIDES: Primary | ICD-10-CM

## 2024-02-21 DIAGNOSIS — G56.02 LEFT CARPAL TUNNEL SYNDROME: ICD-10-CM

## 2024-02-21 PROCEDURE — 1101F PT FALLS ASSESS-DOCD LE1/YR: CPT | Mod: CPTII,S$GLB,,

## 2024-02-21 PROCEDURE — 4010F ACE/ARB THERAPY RXD/TAKEN: CPT | Mod: CPTII,S$GLB,,

## 2024-02-21 PROCEDURE — 1125F AMNT PAIN NOTED PAIN PRSNT: CPT | Mod: CPTII,S$GLB,,

## 2024-02-21 PROCEDURE — 99999 PR PBB SHADOW E&M-EST. PATIENT-LVL III: CPT | Mod: PBBFAC,,,

## 2024-02-21 PROCEDURE — 3288F FALL RISK ASSESSMENT DOCD: CPT | Mod: CPTII,S$GLB,,

## 2024-02-21 PROCEDURE — 99024 POSTOP FOLLOW-UP VISIT: CPT | Mod: S$GLB,,,

## 2024-02-21 PROCEDURE — 1159F MED LIST DOCD IN RCRD: CPT | Mod: CPTII,S$GLB,,

## 2024-02-26 ENCOUNTER — OFFICE VISIT (OUTPATIENT)
Dept: SURGERY | Facility: CLINIC | Age: 74
End: 2024-02-26
Payer: MEDICARE

## 2024-02-26 VITALS
TEMPERATURE: 97 F | HEIGHT: 63 IN | DIASTOLIC BLOOD PRESSURE: 80 MMHG | WEIGHT: 176 LBS | BODY MASS INDEX: 31.18 KG/M2 | SYSTOLIC BLOOD PRESSURE: 115 MMHG | HEART RATE: 103 BPM

## 2024-02-26 DIAGNOSIS — R91.8 LUNG MASS: Primary | ICD-10-CM

## 2024-02-26 DIAGNOSIS — K42.9 UMBILICAL HERNIA WITHOUT OBSTRUCTION AND WITHOUT GANGRENE: ICD-10-CM

## 2024-02-26 PROCEDURE — 3074F SYST BP LT 130 MM HG: CPT | Mod: CPTII,S$GLB,, | Performed by: SURGERY

## 2024-02-26 PROCEDURE — 99999 PR PBB SHADOW E&M-EST. PATIENT-LVL V: CPT | Mod: PBBFAC,,, | Performed by: SURGERY

## 2024-02-26 PROCEDURE — 1159F MED LIST DOCD IN RCRD: CPT | Mod: CPTII,S$GLB,, | Performed by: SURGERY

## 2024-02-26 PROCEDURE — 1126F AMNT PAIN NOTED NONE PRSNT: CPT | Mod: CPTII,S$GLB,, | Performed by: SURGERY

## 2024-02-26 PROCEDURE — 4010F ACE/ARB THERAPY RXD/TAKEN: CPT | Mod: CPTII,S$GLB,, | Performed by: SURGERY

## 2024-02-26 PROCEDURE — 99203 OFFICE O/P NEW LOW 30 MIN: CPT | Mod: S$GLB,,, | Performed by: SURGERY

## 2024-02-26 PROCEDURE — 3079F DIAST BP 80-89 MM HG: CPT | Mod: CPTII,S$GLB,, | Performed by: SURGERY

## 2024-02-26 PROCEDURE — 3008F BODY MASS INDEX DOCD: CPT | Mod: CPTII,S$GLB,, | Performed by: SURGERY

## 2024-02-26 NOTE — PATIENT INSTRUCTIONS
It was very important that you get your PET scan.      After the PET scan you will need to see the pulmonologist or lung doctors to further discuss the abnormality on the CT scan in your lung.      It was likely you will need some type of biopsy of the lesion and or a bronchoscopy, looking into your lungs, as a further evaluation of this lesion.          With regard to the umbilical hernia      I would recommend that you stop taking your Ozempic or Semaglutide now as taking it can delay any necessary procedures    I will be happy to call you with the results of your PET scan    Our office phone numbers are  [phone] and

## 2024-02-26 NOTE — LETTER
February 26, 2024        Adrian Robin MD  [address]  [place] [address]             [facility]  [address] [place] [id number]  Phone: [phone]  Fax: [phone]   Patient: Cassandra Campos   MR Number: [id number]   YOB: 1950   Date of Visit: 2/26/2024       Dear Dr. Robin:    Thank you for referring Cassandra Campos to me for evaluation. Below are the relevant portions of my assessment and plan of care.            If you have questions, please do not hesitate to call me. I look forward to following Cassandra along with you.    Sincerely,      Harmeet Ball MD           CC    No Recipients

## 2024-02-26 NOTE — PROGRESS NOTES
Patient ID: Cassandra Campos is a 73 y.o. female.    Chief Complaint: No chief complaint on file.      HPI:  Evaluation of an umbilical hernia.  She noticed a fullness around her umbilicus and an ultrasound was done showing a hernia.      The patient states that that is fullness is present for several years it does not cause her any pain.      The patient has an approximately 20 pack year smoking history.  She did request a screening CT scan and this was performed.  The screening CT scan showed a 2 cm mass in the left lung that was concerning for malignancy.  A PET scan was recommended.      The patient stated that she was scheduled to see pulmonology in May for this.      She does not report any significant shortness of breath.  She has not report any weight loss or sputum production    Review of Systems   Constitutional:  Negative for activity change and unexpected weight change.   HENT:  Positive for hearing loss and rhinorrhea. Negative for trouble swallowing.    Eyes:  Negative for discharge and visual disturbance.   Respiratory:  Negative for chest tightness and wheezing.    Cardiovascular:  Negative for chest pain and palpitations.   Gastrointestinal:  Positive for diarrhea. Negative for blood in stool, constipation and vomiting.   Endocrine: Negative for polyuria.   Genitourinary:  Positive for difficulty urinating. Negative for dysuria, hematuria and menstrual problem.   Musculoskeletal:  Negative for arthralgias, joint swelling and neck pain.   Neurological:  Negative for weakness and headaches.   Psychiatric/Behavioral:  Negative for confusion and dysphoric mood.      Current Outpatient Medications   Medication Sig Dispense Refill    albuterol (PROVENTIL/VENTOLIN HFA) 90 mcg/actuation inhaler INHALE 2 PUFFS INTO THE LUNGS EVERY 4 HOURS AS NEEDED FOR WHEEZING OR SHORTNESS OF BREATH. 18 g 1    amitriptyline (ELAVIL) 100 MG tablet Take 1 tablet (100 mg total) by mouth every evening. 90 tablet 1    blood  glucose control, high (TRUE METRIX LEVEL 3) Soln 1 each by Other route once daily. 1 each 5    blood sugar diagnostic (ACCU-CHEK GUIDE TEST STRIPS) Strp USE TO TEST TWICE A  strip 3    blood-glucose meter Misc 1 each by Misc.(Non-Drug; Combo Route) route 2 (two) times a day. 1 each 1    cholecalciferol, vitamin D3, (VITAMIN D3) 25 mcg (1,000 unit) capsule Take 1,000 Units by mouth once daily.      eszopiclone (LUNESTA) 3 mg Tab Take 1 tablet (3 mg total) by mouth every evening. 30 tablet 2    HYDROcodone-acetaminophen (NORCO) 5-325 mg per tablet Take 1 tablet by mouth every 6 (six) hours as needed for Pain. 15 tablet 0    lancing device with lancets (ACCU-CHEK SOFT DEV LANCETS) Kit 1 each by Misc.(Non-Drug; Combo Route) route 2 (two) times a day. 1 each 4    LORazepam (ATIVAN) 1 MG tablet Take 1 tablet (1 mg total) by mouth 2 (two) times daily. 60 tablet 2    losartan (COZAAR) 25 MG tablet TAKE 1 TABLET BY MOUTH ONCE  DAILY 90 tablet 2    metoprolol succinate (TOPROL-XL) 50 MG 24 hr tablet TAKE 1 TABLET BY MOUTH ONCE  DAILY 90 tablet 2    omeprazole (PRILOSEC) 20 MG capsule TAKE 1 CAPSULE BY MOUTH ONCE  DAILY 90 capsule 2    RSVPreF3 antigen-AS01E, PF, (AREXVY, PF,) 120 mcg/0.5 mL SusR vaccine Inject into the muscle. 0.5 mL 0    semaglutide (OZEMPIC) 2 mg/dose (8 mg/3 mL) PnIj Inject 2 mg into the skin every 7 days. 3 mL 2    TRUEPLUS LANCETS 33 gauge Misc TEST BLOOD SUGAR ONE TIME DAILY 100 each 3     No current facility-administered medications for this visit.     Facility-Administered Medications Ordered in Other Visits   Medication Dose Route Frequency Provider Last Rate Last Admin    lactated ringers infusion   Intravenous On Call Procedure Dayanna Jones PA   New Bag at 11/20/20 0912    nozaseptin (NOZIN) nasal    Each Nostril On Call Procedure Dayanna Jones PA   Given at 11/20/20 0825       Review of patient's allergies indicates:  No Known Allergies    Past Medical History:    Diagnosis Date    Arthritis     hands    Bilateral bunions     Borderline glaucoma     De Quervain's disease (radial styloid tenosynovitis)     Gastritis     upper GI 2017    Hydradenitis     Hyperlipidemia     Hypertension     Insomnia     Migraines 2000    Nasal septum perforation     Obesity     Pneumonia     Restrictive airway disease     Sleep apnea     SVT (supraventricular tachycardia) 2013    Trigger finger     Type 2 diabetes mellitus 2012     am 2024       Past Surgical History:   Procedure Laterality Date    AXILLARY HIDRADENITIS EXCISION Bilateral     BONE EXOSTOSIS EXCISION Right 2018    Procedure: EXCISION, EXOSTOSIS;  Surgeon: Jayro Pedraza Sr., MD;  Location: [facility];  Service: Orthopedics;  Laterality: Right;    BREAST BIOPSY Bilateral     both benign    BREAST SURGERY  1998    CARPAL TUNNEL RELEASE      bilateral    CARPAL TUNNEL RELEASE Right 2024    Procedure: RELEASE, CARPAL TUNNEL;  Surgeon: Jaime Oswald MD;  Location: [facility];  Service: Orthopedics;  Laterality: Right;    CATARACT EXTRACTION Bilateral     OU     SECTION  1979    CHOLECYSTECTOMY  2014    COLONOSCOPY N/A 10/02/2020    Procedure: COLONOSCOPY;  Surgeon: Tushar Edwards MD;  Location: [facility];  Service: Endoscopy;  Laterality: N/A;    COLONOSCOPY W/ POLYPECTOMY  10/02/2020    Polyps x3, repeat 5 years; Tushar Edwards MD     CYST REMOVAL  2015    sebaceous cyst removed from face    DE QUERVAIN'S RELEASE Left 2020    Procedure: RELEASE, HAND, FOR DEQUERVAIN'S TENOSYNOVITIS;  Surgeon: Jaime Oswald MD;  Location: [facility] OR;  Service: Orthopedics;  Laterality: Left;    DE QUERVAIN'S RELEASE Right 2020    Procedure: RELEASE, HAND, FOR DEQUERVAIN'S TENOSYNOVITIS;  Surgeon: Jaime Oswald MD;  Location: [facility];  Service: Orthopedics;  Laterality: Right;    EYE SURGERY      gastric sleeve  2017    Dr. Watson    KNEE SURGERY Right      OLECRANON BURSECTOMY Right 2018    Procedure: BURSECTOMY, OLECRANON;  Surgeon: Jayro Pedraza Sr., MD;  Location: [facility] OR;  Service: Orthopedics;  Laterality: Right;    SURGICAL REMOVAL OF BUNION WITH OSTEOTOMY OF METATARSAL BONE Left 05/10/2019    Procedure: BUNIONECTOMY, WITH METATARSAL OSTEOTOMY;  Surgeon: Srinivasan Villanueva DPM;  Location: [facility] OR;  Service: Podiatry;  Laterality: Left;    SURGICAL REMOVAL OF BUNION WITH OSTEOTOMY OF METATARSAL BONE Right 2019    Procedure: BUNIONECTOMY, WITH METATARSAL OSTEOTOMY;  Surgeon: Srinivasan Villanueva DPM;  Location: [facility] OR;  Service: Podiatry;  Laterality: Right;    TONSILLECTOMY, ADENOIDECTOMY  1980s    TRIGGER FINGER RELEASE Right 2015    Dr. Pedraza       Social History     Socioeconomic History    Marital status:     Number of children: 1   Occupational History    Occupation:  aid   Tobacco Use    Smoking status: Former     Current packs/day: 0.00     Average packs/day: 0.5 packs/day for 42.0 years (21.0 ttl pk-yrs)     Types: Cigarettes     Start date: 1970     Quit date: 2012     Years since quittin.1     Passive exposure: Past    Smokeless tobacco: Never   Substance and Sexual Activity    Alcohol use: Not Currently     Alcohol/week: 1.0 standard drink of alcohol     Types: 1 Glasses of wine per week     Comment: Glass red wine once a every 2 weeks    Drug use: Never    Sexual activity: Not Currently     Partners: Female     Birth control/protection: Abstinence, None   Social History Narrative    Single, part-time teacher. Masters degree biology.      Social Determinants of Health     Financial Resource Strain: Low Risk  (2023)    Overall Financial Resource Strain (CARDIA)     Difficulty of Paying Living Expenses: Not hard at all   Food Insecurity: Food Insecurity Present (2023)    Hunger Vital Sign     Worried About Running Out of Food in the Last Year: Never true     Ran Out of Food in the Last  Year: Sometimes true   Transportation Needs: No Transportation Needs (12/19/2023)    PRAPARE - Transportation     Lack of Transportation (Medical): No     Lack of Transportation (Non-Medical): No   Physical Activity: Insufficiently Active (12/19/2023)    Exercise Vital Sign     Days of Exercise per Week: 3 days     Minutes of Exercise per Session: 20 min   Stress: No Stress Concern Present (12/19/2023)    [employer] - Occupational Stress Questionnaire     Feeling of Stress : Not at all   Social Connections: Unknown (12/19/2023)    Social Connection and Isolation Panel [NHANES]     Frequency of Communication with Friends and Family: Never     Frequency of Social Gatherings with Friends and Family: More than three times a week     Active Member of Clubs or Organizations: Patient declined     Attends Club or Organization Meetings: 1 to 4 times per year     Marital Status:    Housing Stability: Low Risk  (12/19/2023)    Housing Stability Vital Sign     Unable to Pay for Housing in the Last Year: No     Number of Places Lived in the Last Year: 1     Unstable Housing in the Last Year: No       Vitals:    02/26/24 1402   BP: 115/80   Pulse: 103   Temp: 97 °F (36.1 °C)       Physical Exam  Constitutional:       Appearance: She is well-developed. She is obese.   HENT:      Head: Normocephalic.   Eyes:      Pupils: Pupils are equal, round, and reactive to light.   Neck:      Thyroid: No thyromegaly.      Vascular: No JVD.      Trachea: No tracheal deviation.   Cardiovascular:      Rate and Rhythm: Normal rate and regular rhythm.      Heart sounds: Normal heart sounds.   Pulmonary:      Breath sounds: Normal breath sounds. No wheezing.   Abdominal:      General: Bowel sounds are normal. There is no distension.      Palpations: Abdomen is soft. Abdomen is not rigid. There is no mass.      Tenderness: There is no abdominal tenderness. There is no guarding or rebound.      Comments: There is  a fullness to the left and inferiorly to the umbilicus   Musculoskeletal:         General: Normal range of motion.   Lymphadenopathy:      Cervical: No cervical adenopathy.   Skin:     General: Skin is warm and dry.      Findings: No erythema or rash.   Neurological:      Mental Status: She is alert and oriented to person, place, and time.     EXAM: US SOFT TISSUE, ABDOMEN     CLINICAL HISTORY:  Abdominal wall mass     PRIOR:  NONE     TECHNIQUE: Grayscale and Doppler imaging provided     FINDINGS: Ultrasound interrogation of the area of palpability at the umbilicus confirms the presence of a 4.6 cm fat containing umbilical hernia sac with a 1.3 cm fascial defect.  There is no peristalsis in the sac and there appears to be preperitoneal fat only.        Impression:   Large umbilical hernia containing preperitoneal fat as described    EXAMINATION:  CT CHEST WITHOUT CONTRAST     CLINICAL HISTORY:  Solitary pulmonary noduleAbnormal xray - lung nodule, < 1 cm, low risk;     TECHNIQUE:  Noncontrast CT scan of the chest.     COMPARISON:  Chest x-ray dated 11/03/2021.     FINDINGS:  2.3 x 1.9 cm lobular mass involving the lingula abutting the pleural surface in the left lung base.  Additional 13 mm subpleural nodule left lower lobe.  No other pulmonary nodules are identified.  No enlarged lymph nodes.  No pleural fluid.  There are aortic and coronary artery calcifications.  Gallbladder has been removed.  No acute osseous abnormality is identified.     Impression:     There are 2 nodules in the left lung, 1 in the lingula measuring 2.3 x 1.9 cm in diameter and a subpleural 1.3 cm nodule in the left lower lobe.  Consider PET scan or histologic evaluation.        Electronically signed by: Jorge A Olivares  Date:                                            02/16/2024  Time:                                           12:33    Assessment & Plan:    1.  An umbilical hernia.  The patient would likely benefit from repair of the umbilical  hernia through an open reports with mesh however the lung issue is more pressing    Two.  2.3 x 1 point 9 cm mass involving the lingula that is concerning for malignancy   A stable 13 mm subpleural left lung nodule that is essentially stable in size      Plan: 1.  Obtain a PET scan   A more urgent appointment with Pulmonary Medicine, this was discussed with Dr. Robin and he will see her after the PET scan has been completed.      Follow up with surgery as needed    PET scan is scheduled for :  March 14, 2024

## 2024-02-27 ENCOUNTER — TELEPHONE (OUTPATIENT)
Dept: PULMONOLOGY | Facility: CLINIC | Age: 74
End: 2024-02-27
Payer: MEDICARE

## 2024-02-27 NOTE — TELEPHONE ENCOUNTER
----- Message from Adrian Robin MD sent at 2/26/2024  3:13 PM CST -----  Okay, appt after PET  ----- Message -----  From: Harmeet Ball MD  Sent: 2/26/2024   2:52 PM CST  To: Adrian Robin MD    PET scan is scheduled for March 14th.

## 2024-03-01 ENCOUNTER — PATIENT MESSAGE (OUTPATIENT)
Dept: ORTHOPEDICS | Facility: CLINIC | Age: 74
End: 2024-03-01
Payer: MEDICARE

## 2024-03-07 ENCOUNTER — TELEPHONE (OUTPATIENT)
Dept: PREADMISSION TESTING | Facility: HOSPITAL | Age: 74
End: 2024-03-07
Payer: MEDICARE

## 2024-03-07 ENCOUNTER — ANESTHESIA EVENT (OUTPATIENT)
Dept: SURGERY | Facility: HOSPITAL | Age: 74
End: 2024-03-07
Payer: MEDICARE

## 2024-03-07 NOTE — TELEPHONE ENCOUNTER
Called and spoke with pt about the following:     Please arrive to [facility] ([facility] at 0530 am on 3/8/24 for your scheduled procedure.  Address: [address] [place] [address] [place] [address] ([address] Building on left, 1st Floor Lobby)  >>>NO eating or drinking after midnight unless instructed otherwise by your Surgeon<<<    Thank you,  -[facility].  Mon-Fri 7:30 am - 4 pm [phone]

## 2024-03-08 ENCOUNTER — ANESTHESIA (OUTPATIENT)
Dept: SURGERY | Facility: HOSPITAL | Age: 74
End: 2024-03-08
Payer: MEDICARE

## 2024-03-08 ENCOUNTER — HOSPITAL ENCOUNTER (OUTPATIENT)
Facility: HOSPITAL | Age: 74
Discharge: HOME | End: 2024-03-08
Attending: ORTHOPAEDIC SURGERY | Admitting: ORTHOPAEDIC SURGERY
Payer: MEDICARE

## 2024-03-08 DIAGNOSIS — G56.02 LEFT CARPAL TUNNEL SYNDROME: ICD-10-CM

## 2024-03-08 DIAGNOSIS — G56.03 BILATERAL CARPAL TUNNEL SYNDROME: Primary | ICD-10-CM

## 2024-03-08 LAB — POCT GLUCOSE: 84 MG/DL (ref 70–110)

## 2024-03-08 PROCEDURE — 25000003 PHARM REV CODE 250: Performed by: ORTHOPAEDIC SURGERY

## 2024-03-08 PROCEDURE — 36000707: Performed by: ORTHOPAEDIC SURGERY

## 2024-03-08 PROCEDURE — 64721 CARPAL TUNNEL SURGERY: CPT | Mod: 79,LT,, | Performed by: ORTHOPAEDIC SURGERY

## 2024-03-08 PROCEDURE — 37000008 HC ANESTHESIA 1ST 15 MINUTES: Performed by: ORTHOPAEDIC SURGERY

## 2024-03-08 PROCEDURE — 36000706: Performed by: ORTHOPAEDIC SURGERY

## 2024-03-08 PROCEDURE — 37000009 HC ANESTHESIA EA ADD 15 MINS: Performed by: ORTHOPAEDIC SURGERY

## 2024-03-08 PROCEDURE — 63600175 PHARM REV CODE 636 W HCPCS: Performed by: FAMILY MEDICINE

## 2024-03-08 PROCEDURE — 71000015 HC POSTOP RECOV 1ST HR: Performed by: ORTHOPAEDIC SURGERY

## 2024-03-08 PROCEDURE — 63600175 PHARM REV CODE 636 W HCPCS

## 2024-03-08 PROCEDURE — 25000003 PHARM REV CODE 250: Performed by: FAMILY MEDICINE

## 2024-03-08 PROCEDURE — 71000033 HC RECOVERY, INTIAL HOUR: Performed by: ORTHOPAEDIC SURGERY

## 2024-03-08 RX ORDER — PROPOFOL 10 MG/ML
VIAL (ML) INTRAVENOUS
Status: DISCONTINUED | OUTPATIENT
Start: 2024-03-08 | End: 2024-03-08

## 2024-03-08 RX ORDER — ONDANSETRON HYDROCHLORIDE 2 MG/ML
4 INJECTION, SOLUTION INTRAVENOUS DAILY PRN
Status: CANCELLED | OUTPATIENT
Start: 2024-03-08

## 2024-03-08 RX ORDER — OXYCODONE AND ACETAMINOPHEN 5; 325 MG/1; MG/1
1 TABLET ORAL
Status: CANCELLED | OUTPATIENT
Start: 2024-03-08

## 2024-03-08 RX ORDER — OXYCODONE HYDROCHLORIDE 5 MG/1
10 TABLET ORAL EVERY 4 HOURS PRN
Status: CANCELLED | OUTPATIENT
Start: 2024-03-08

## 2024-03-08 RX ORDER — HYDROMORPHONE HYDROCHLORIDE 2 MG/ML
0.2 INJECTION, SOLUTION INTRAMUSCULAR; INTRAVENOUS; SUBCUTANEOUS EVERY 5 MIN PRN
Status: CANCELLED | OUTPATIENT
Start: 2024-03-08

## 2024-03-08 RX ORDER — LIDOCAINE HYDROCHLORIDE 20 MG/ML
INJECTION, SOLUTION EPIDURAL; INFILTRATION; INTRACAUDAL; PERINEURAL
Status: DISCONTINUED | OUTPATIENT
Start: 2024-03-08 | End: 2024-03-08 | Stop reason: HOSPADM

## 2024-03-08 RX ORDER — SODIUM CHLORIDE 0.9 % (FLUSH) 0.9 %
10 SYRINGE (ML) INJECTION
Status: DISCONTINUED | OUTPATIENT
Start: 2024-03-08 | End: 2024-03-08 | Stop reason: HOSPADM

## 2024-03-08 RX ORDER — LIDOCAINE HYDROCHLORIDE 20 MG/ML
INJECTION, SOLUTION EPIDURAL; INFILTRATION; INTRACAUDAL; PERINEURAL
Status: DISCONTINUED | OUTPATIENT
Start: 2024-03-08 | End: 2024-03-08

## 2024-03-08 RX ORDER — CHLORHEXIDINE GLUCONATE ORAL RINSE 1.2 MG/ML
10 SOLUTION DENTAL
Status: ACTIVE | OUTPATIENT
Start: 2024-03-08

## 2024-03-08 RX ORDER — CHLORHEXIDINE GLUCONATE ORAL RINSE 1.2 MG/ML
10 SOLUTION DENTAL 2 TIMES DAILY
Status: CANCELLED | OUTPATIENT
Start: 2024-03-08 | End: 2024-03-13

## 2024-03-08 RX ORDER — CEFAZOLIN SODIUM 2 G/50ML
2 SOLUTION INTRAVENOUS
Status: COMPLETED | OUTPATIENT
Start: 2024-03-08 | End: 2024-03-08

## 2024-03-08 RX ORDER — OXYCODONE AND ACETAMINOPHEN 10; 325 MG/1; MG/1
1 TABLET ORAL EVERY 6 HOURS PRN
Qty: 28 TABLET | Refills: 0 | Status: SHIPPED | OUTPATIENT
Start: 2024-03-08 | End: 2024-04-01

## 2024-03-08 RX ADMIN — PROPOFOL 30 MG: 10 INJECTION, EMULSION INTRAVENOUS at 06:03

## 2024-03-08 RX ADMIN — SODIUM CHLORIDE, SODIUM LACTATE, POTASSIUM CHLORIDE, AND CALCIUM CHLORIDE: .6; .31; .03; .02 INJECTION, SOLUTION INTRAVENOUS at 06:03

## 2024-03-08 RX ADMIN — PROPOFOL 20 MG: 10 INJECTION, EMULSION INTRAVENOUS at 07:03

## 2024-03-08 RX ADMIN — CEFAZOLIN SODIUM 2 G: 2 SOLUTION INTRAVENOUS at 07:03

## 2024-03-08 RX ADMIN — LIDOCAINE HYDROCHLORIDE 50 MG: 20 INJECTION, SOLUTION EPIDURAL; INFILTRATION; INTRACAUDAL; PERINEURAL at 06:03

## 2024-03-08 NOTE — ANESTHESIA PREPROCEDURE EVALUATION
03/07/2024  Cassandra Campos is a 73 y.o., female    Patient Active Problem List   Diagnosis    Hyperlipidemia associated with type 2 diabetes mellitus    Insomnia    Hip arthritis    Obesity    Paroxysmal SVT (supraventricular tachycardia)    GERD (gastroesophageal reflux disease)    Prepatellar bursitis of right knee    Transient synovitis of right knee    Chondromalacia of right knee    Primary hypertension    Calcified granuloma of lung    Osteopenia    Onychomycosis of multiple toenails with type 2 diabetes mellitus    Cough    Controlled type 2 diabetes mellitus without complication, without long-term current use of insulin    Secondary hypertension    Unequal blood pressures in paired extremities    De Quervain's disease (radial styloid tenosynovitis)    Breast screening    Tenosynovitis, de Quervain    Skin tag    Panic attack as reaction to stress    Mild intermittent asthma    BMI 31.0-31.9,adult    Elevated liver enzymes    Iron deficiency    Severe obesity (BMI 35.0-39.9) with comorbidity    Aortic atherosclerosis    Anxiety    Bilateral carpal tunnel syndrome    Major depressive disorder, single episode, mild    Solitary pulmonary nodule    Pelvic pain    Abdominal wall mass     Past Medical History:   Diagnosis Date    Arthritis     hands    Bilateral bunions     Borderline glaucoma     De Quervain's disease (radial styloid tenosynovitis)     Gastritis     upper GI 2/2017    Hydradenitis     Hyperlipidemia     Hypertension     Insomnia     Migraines 02/01/2000    Nasal septum perforation     Obesity     Pneumonia     Restrictive airway disease     Sleep apnea     SVT (supraventricular tachycardia) 09/2013    Trigger finger     Type 2 diabetes mellitus 2012     am 02/01/2024     Past Surgical History:   Procedure Laterality Date    AXILLARY HIDRADENITIS EXCISION Bilateral     BONE  EXOSTOSIS EXCISION Right 2018    Procedure: EXCISION, EXOSTOSIS;  Surgeon: Jayro Pedraza Sr., MD;  Location: [facility] OR;  Service: Orthopedics;  Laterality: Right;    BREAST BIOPSY Bilateral     both benign    BREAST SURGERY  1998    CARPAL TUNNEL RELEASE      bilateral    CARPAL TUNNEL RELEASE Right 2024    Procedure: RELEASE, CARPAL TUNNEL;  Surgeon: Jaime Oswald MD;  Location: [facility] OR;  Service: Orthopedics;  Laterality: Right;    CATARACT EXTRACTION Bilateral     OU     SECTION  1979    CHOLECYSTECTOMY  2014    COLONOSCOPY N/A 10/02/2020    Procedure: COLONOSCOPY;  Surgeon: Tushar Edwards MD;  Location: [facility];  Service: Endoscopy;  Laterality: N/A;    COLONOSCOPY W/ POLYPECTOMY  10/02/2020    Polyps x3, repeat 5 years; Tushar Edwards MD     CYST REMOVAL  2015    sebaceous cyst removed from face    DE QUERVAIN'S RELEASE Left 2020    Procedure: RELEASE, HAND, FOR DEQUERVAIN'S TENOSYNOVITIS;  Surgeon: Jaime Oswald MD;  Location: [facility] OR;  Service: Orthopedics;  Laterality: Left;    DE QUERVAIN'S RELEASE Right 2020    Procedure: RELEASE, HAND, FOR DEQUERVAIN'S TENOSYNOVITIS;  Surgeon: Jaime Oswald MD;  Location: [facility];  Service: Orthopedics;  Laterality: Right;    EYE SURGERY      gastric sleeve  2017    Dr. Watson    KNEE SURGERY Right     OLECRANON BURSECTOMY Right 2018    Procedure: BURSECTOMY, OLECRANON;  Surgeon: Jayro Pedraza Sr., MD;  Location: [facility];  Service: Orthopedics;  Laterality: Right;    SURGICAL REMOVAL OF BUNION WITH OSTEOTOMY OF METATARSAL BONE Left 05/10/2019    Procedure: BUNIONECTOMY, WITH METATARSAL OSTEOTOMY;  Surgeon: Srinivasan Villanueva DPM;  Location: [facility] OR;  Service: Podiatry;  Laterality: Left;    SURGICAL REMOVAL OF BUNION WITH OSTEOTOMY OF METATARSAL BONE Right 2019    Procedure: BUNIONECTOMY, WITH METATARSAL OSTEOTOMY;  Surgeon: Srinivasan Villanueva DPM;  Location: [facility] OR;  Service: Podiatry;   Laterality: Right;    TONSILLECTOMY, ADENOIDECTOMY  1980s    TRIGGER FINGER RELEASE Right 04/01/2015    Dr. Pedraza       Chemistry        Component Value Date/Time     02/01/2024 1410    K 4.1 02/01/2024 1410     02/01/2024 1410    CO2 26 02/01/2024 1410    BUN 7 (L) 02/01/2024 1410    CREATININE 0.7 02/01/2024 1410    GLU 78 02/01/2024 1410        Component Value Date/Time    CALCIUM 9.7 02/01/2024 1410    ALKPHOS 110 12/27/2023 1441    AST 26 12/27/2023 1441    ALT 58 (H) 12/27/2023 1441    BILITOT 0.2 12/27/2023 1441    ESTGFRAFRICA >60 06/21/2022 0935    EGFRNONAA >60 06/21/2022 0935        Lab Results   Component Value Date    WBC 7.12 02/01/2024    HGB 13.9 02/01/2024    HCT 41.8 02/01/2024    MCV 76 (L) 02/01/2024     02/01/2024       Pre-op Assessment    I have reviewed the Patient Summary Reports.    I have reviewed the NPO Status.   I have reviewed the Medications.     Review of Systems  Anesthesia Hx:  No problems with previous Anesthesia   History of prior surgery of interest to airway management or planning:  Previous anesthesia: General, MAC          Denies Personal Hx of Anesthesia complications.                    Social:  Non-Smoker       Hematology/Oncology:  Hematology Normal   Oncology Normal                                   Cardiovascular:     Hypertension              ECG has been reviewed. Normal sinus rhythm   Left axis deviation   Minimal voltage criteria for LVH, may be normal variant ( Madhav product )   Abnormal ECG   When compared with ECG of 19-NOV-2020 16:10,   No significant change was found   Confirmed by TY MARES, LAM ([id number]) on 2/1/2024 11:21:17 PM                            Pulmonary:  Pneumonia  Asthma mild   Sleep Apnea                Renal/:  Renal/ Normal                 Hepatic/GI:     GERD             Musculoskeletal:  Arthritis   Bilateral CTS            Neurological:      Headaches                                 Endocrine:  Diabetes, type 2   BMI  31      Obesity / BMI > 30      Physical Exam  General: Well nourished, Cooperative, Alert and Oriented    Airway:  Mallampati: II   Mouth Opening: Normal  TM Distance: Normal  Tongue: Normal  Neck ROM: Normal ROM    Dental:  Intact        Anesthesia Plan  Type of Anesthesia, risks & benefits discussed:    Anesthesia Type: MAC  Intra-op Monitoring Plan: Standard ASA Monitors  Post Op Pain Control Plan: multimodal analgesia and IV/PO Opioids PRN  Induction:  IV  Informed Consent: Informed consent signed with the Patient and all parties understand the risks and agree with anesthesia plan.  All questions answered.   ASA Score: 3  Day of Surgery Review of History & Physical: H&P Update referred to the surgeon/provider.    Ready For Surgery From Anesthesia Perspective.     .

## 2024-03-08 NOTE — OP NOTE
Critical access hospital - Surgery ([facility])  Orthopedic Surgery  Operative Note    SUMMARY     Date of Procedure: 3/8/2024   Assistant: None    Procedure: Procedure(s) (LRB):  RELEASE, CARPAL TUNNEL (Left)       Surgeon(s) and Role:     * Jaime Oswald MD - Primary    Assisting Surgeon: None    Pre-Operative Diagnosis: Carpal tunnel syndrome left wrist    Post-Operative Diagnosis:  Left carpal tunnel syndrome    Anesthesia:  Local with sedation    Technical Procedures Used:  left carpal tunnel release    Description of the Findings of the Procedure:  The patient was taken to the operating room where 10 cc of 2% plain lidocaine was used for local anesthetic and was administered.  After exsanguination of the extremity a proximal tourniquet was inflated to 250 mm of mercury.  Satisfactory anesthesia had been achieved the left hand was prepped and draped in the usual sterile fashion.  The patient did receive 2 g of Ancef intravenously preoperatively.  At this time a longitudinal incision was made in line with the 4th ray over the transverse carpal ligament.  The incision was extended using blunt and sharp dissection under 3.5 loupe magnification.  The transverse carpal ligament was identified and sharply incised under direct vision.  A complete release was performed and verified by the surgeon's small finger both proximally and distally.  No additional pathology was encountered.  Satisfactory release had been achieved skin was closed using horizontal mattress 4 0 nylon suture.  Xeroform gauze 4x4s and 2 ABD pads were over wrapped with 3 in gauze dressing.  The patient tolerated the procedure well and was transferred to the recovery room in satisfactory condition.      Complications: No    Estimated Blood Loss (EBL): 5cc                        Condition: Good    Disposition: PACU - hemodynamically stable.    Attestation: I was present and scrubbed for the entire procedure.

## 2024-03-08 NOTE — H&P
SUBJECTIVE:      Patient ID: Cassandra Campos is a 73 y.o. female.    HPI: Ms. Campos is here today for post-operative visit #1.  She is 4 days status post RELEASE, CARPAL TUNNEL (Right) and median nerve epineural neural lysis by Dr. Oswald on 24. She reports that she is doing well.  Pain is 5/10. She is taking the Norco for pain as prescribed.  She has been compliant with postop instructions and keeping the extremity dry. She denies fever, chills, and sweats since the time of the surgery.     Past Medical History:   Diagnosis Date    Arthritis     hands    Bilateral bunions     Borderline glaucoma     De Quervain's disease (radial styloid tenosynovitis)     Gastritis     upper GI 2017    Hydradenitis     Hyperlipidemia     Hypertension     Insomnia     Migraines 2000    Nasal septum perforation     Obesity     Pneumonia     Restrictive airway disease     Sleep apnea     SVT (supraventricular tachycardia) 2013    Trigger finger     Type 2 diabetes mellitus      am 2024     Past Surgical History:   Procedure Laterality Date    AXILLARY HIDRADENITIS EXCISION Bilateral     BONE EXOSTOSIS EXCISION Right 2018    Procedure: EXCISION, EXOSTOSIS;  Surgeon: Jayro Pedraza Sr., MD;  Location: [facility] OR;  Service: Orthopedics;  Laterality: Right;    BREAST BIOPSY Bilateral     both benign    BREAST SURGERY  1998    CARPAL TUNNEL RELEASE      bilateral    CARPAL TUNNEL RELEASE Right 2024    Procedure: RELEASE, CARPAL TUNNEL;  Surgeon: Jaime Oswald MD;  Location: [facility] OR;  Service: Orthopedics;  Laterality: Right;    CATARACT EXTRACTION Bilateral     OU     SECTION  1979    CHOLECYSTECTOMY  2014    COLONOSCOPY N/A 10/02/2020    Procedure: COLONOSCOPY;  Surgeon: Tushar Edwards MD;  Location: [facility] ENDO;  Service: Endoscopy;  Laterality: N/A;    COLONOSCOPY W/ POLYPECTOMY  10/02/2020    Polyps x3, repeat 5 years; Tushar Edwards MD     CYST REMOVAL  2015     sebaceous cyst removed from face    DE QUERVAIN'S RELEASE Left 2020    Procedure: RELEASE, HAND, FOR DEQUERVAIN'S TENOSYNOVITIS;  Surgeon: Jaime Oswald MD;  Location: [facility];  Service: Orthopedics;  Laterality: Left;    DE QUERVAIN'S RELEASE Right 2020    Procedure: RELEASE, HAND, FOR DEQUERVAIN'S TENOSYNOVITIS;  Surgeon: Jaime Oswald MD;  Location: [facility];  Service: Orthopedics;  Laterality: Right;    EYE SURGERY      gastric sleeve  2017    Dr. Watson    KNEE SURGERY Right     OLECRANON BURSECTOMY Right 2018    Procedure: BURSECTOMY, OLECRANON;  Surgeon: Jayro Pedraza Sr., MD;  Location: [facility];  Service: Orthopedics;  Laterality: Right;    SURGICAL REMOVAL OF BUNION WITH OSTEOTOMY OF METATARSAL BONE Left 05/10/2019    Procedure: BUNIONECTOMY, WITH METATARSAL OSTEOTOMY;  Surgeon: Srinivasan Villanueva DPM;  Location: [facility];  Service: Podiatry;  Laterality: Left;    SURGICAL REMOVAL OF BUNION WITH OSTEOTOMY OF METATARSAL BONE Right 2019    Procedure: BUNIONECTOMY, WITH METATARSAL OSTEOTOMY;  Surgeon: Srinivasan Villanueva DPM;  Location: [facility];  Service: Podiatry;  Laterality: Right;    TONSILLECTOMY, ADENOIDECTOMY  1980s    TRIGGER FINGER RELEASE Right 2015    Dr. Pedraza     Family History   Problem Relation Age of Onset    Prostate cancer Brother     Diabetes Maternal Aunt     Diabetes Cousin     Hypertension Maternal Grandmother      Social History     Socioeconomic History    Marital status:     Number of children: 1   Occupational History    Occupation:  aid   Tobacco Use    Smoking status: Former     Current packs/day: 0.00     Average packs/day: 0.5 packs/day for 42.0 years (21.0 ttl pk-yrs)     Types: Cigarettes     Start date: 1970     Quit date: 2012     Years since quittin.1     Passive exposure: Past    Smokeless tobacco: Never   Substance and Sexual Activity    Alcohol use: Not Currently     Alcohol/week: 1.0  standard drink of alcohol     Types: 1 Glasses of wine per week     Comment: Glass red wine once a every 2 weeks    Drug use: Never    Sexual activity: Not Currently     Partners: Female     Birth control/protection: Abstinence, None   Social History Narrative    Single, part-time teacher. Masters degree biology.      Social Determinants of Health     Financial Resource Strain: Low Risk  (12/19/2023)    Overall Financial Resource Strain (CARDIA)     Difficulty of Paying Living Expenses: Not hard at all   Food Insecurity: Food Insecurity Present (12/19/2023)    Hunger Vital Sign     Worried About Running Out of Food in the Last Year: Never true     Ran Out of Food in the Last Year: Sometimes true   Transportation Needs: No Transportation Needs (12/19/2023)    PRAPARE - Transportation     Lack of Transportation (Medical): No     Lack of Transportation (Non-Medical): No   Physical Activity: Insufficiently Active (12/19/2023)    Exercise Vital Sign     Days of Exercise per Week: 3 days     Minutes of Exercise per Session: 20 min   Stress: No Stress Concern Present (12/19/2023)    [employer] [place] [employer] - Occupational Stress Questionnaire     Feeling of Stress : Not at all   Social Connections: Unknown (12/19/2023)    Social Connection and Isolation Panel [NHANES]     Frequency of Communication with Friends and Family: Never     Frequency of Social Gatherings with Friends and Family: More than three times a week     Active Member of Clubs or Organizations: Patient declined     Attends Club or Organization Meetings: 1 to 4 times per year     Marital Status:    Housing Stability: Low Risk  (12/19/2023)    Housing Stability Vital Sign     Unable to Pay for Housing in the Last Year: No     Number of Places Lived in the Last Year: 1     Unstable Housing in the Last Year: No     Current Discharge Medication List        CONTINUE these medications which have NOT CHANGED    Details   amitriptyline  (ELAVIL) 100 MG tablet Take 1 tablet (100 mg total) by mouth every evening.  Qty: 90 tablet, Refills: 1      cholecalciferol, vitamin D3, (VITAMIN D3) 25 mcg (1,000 unit) capsule Take 1,000 Units by mouth once daily.      LORazepam (ATIVAN) 1 MG tablet Take 1 tablet (1 mg total) by mouth 2 (two) times daily.  Qty: 60 tablet, Refills: 2    Comments: Not to exceed 3 additional fills before 01/02/2024      losartan (COZAAR) 25 MG tablet TAKE 1 TABLET BY MOUTH ONCE  DAILY  Qty: 90 tablet, Refills: 2    Comments: Requesting 1 year supply      metoprolol succinate (TOPROL-XL) 50 MG 24 hr tablet TAKE 1 TABLET BY MOUTH ONCE  DAILY  Qty: 90 tablet, Refills: 2    Comments: Please send a replace/new response with 90-Day Supply if appropriate to maximize member benefit. Requesting 1 year supply. - .  Associated Diagnoses: Secondary hypertension      omeprazole (PRILOSEC) 20 MG capsule TAKE 1 CAPSULE BY MOUTH ONCE  DAILY  Qty: 90 capsule, Refills: 2    Comments: Please send a replace/new response with 90-Day Supply if appropriate to maximize member benefit. Requesting 1 year supply.      semaglutide (OZEMPIC) 2 mg/dose (8 mg/3 mL) PnIj Inject 2 mg into the skin every 7 days.  Qty: 3 mL, Refills: 2    Associated Diagnoses: Controlled type 2 diabetes mellitus without complication, without long-term current use of insulin      albuterol (PROVENTIL/VENTOLIN HFA) 90 mcg/actuation inhaler INHALE 2 PUFFS INTO THE LUNGS EVERY 4 HOURS AS NEEDED FOR WHEEZING OR SHORTNESS OF BREATH.  Qty: 18 g, Refills: 1    Associated Diagnoses: Acute bronchitis, unspecified organism      blood glucose control, high (TRUE METRIX LEVEL 3) Soln 1 each by Other route once daily.  Qty: 1 each, Refills: 5      blood sugar diagnostic (ACCU-CHEK GUIDE TEST STRIPS) Strp USE TO TEST TWICE A DAY  Qty: 200 strip, Refills: 3    Associated Diagnoses: Controlled type 2 diabetes mellitus without complication, with long-term current use of insulin      blood-glucose  "meter Misc 1 each by Misc.(Non-Drug; Combo Route) route 2 (two) times a day.  Qty: 1 each, Refills: 1    Comments: AccuChek  Associated Diagnoses: Controlled type 2 diabetes mellitus without complication, with long-term current use of insulin      eszopiclone (LUNESTA) 3 mg Tab Take 1 tablet (3 mg total) by mouth every evening.  Qty: 30 tablet, Refills: 2      HYDROcodone-acetaminophen (NORCO) 5-325 mg per tablet Take 1 tablet by mouth every 6 (six) hours as needed for Pain.  Qty: 15 tablet, Refills: 0    Comments: Quantity prescribed more than 7 day supply? No      lancing device with lancets (ACCU-CHEK SOFT DEV LANCETS) Kit 1 each by Misc.(Non-Drug; Combo Route) route 2 (two) times a day.  Qty: 1 each, Refills: 4    Associated Diagnoses: Controlled type 2 diabetes mellitus without complication, with long-term current use of insulin      RSVPreF3 antigen-AS01E, PF, (AREXVY, PF,) 120 mcg/0.5 mL SusR vaccine Inject into the muscle.  Qty: 0.5 mL, Refills: 0      TRUEPLUS LANCETS 33 gauge Misc TEST BLOOD SUGAR ONE TIME DAILY  Qty: 100 each, Refills: 3           Review of patient's allergies indicates:  No Known Allergies    OBJECTIVE:     Physical exam:    Vitals:    03/08/24 0547   BP: 120/82   Pulse: 93   Resp: 18   Temp: 97.5 °F (36.4 °C)   TempSrc: Temporal   Weight: 80 kg (176 lb 5.9 oz)   Height: 5' 3" (1.6 m)     Vital signs are stable, patient is afebrile.  Patient is well dressed and well groomed, no acute distress.  Alert and oriented to person, place, and time.    Right UE:  Post op dressing taken down.   Incision is clean, dry, and intact.   There is no erythema or exudate. There is no sign of any infection.   Mild swelling to hand and wrist  Mild-mod ecchymosis locally  She is NVI.   Sutures in place.   2+ pulses noted.  Cap refill <2 seconds  Motor intact to hand    ASSESSMENT         Encounter Diagnosis   Name Primary?    S/P carpal tunnel release Yes            4 days status post RELEASE, CARPAL TUNNEL " (Right) and median nerve epineural neural lysis    PLAN:           Cassandra was seen today for pain and post-op evaluation.    Diagnoses and all orders for this visit:    S/P carpal tunnel release    - PO instruction reviewed and provided to patient  - The incision was cleaned with hydrogen peroxide and normal saline. A sterile Band-Aid was applied.   - Patient may clean the incision daily as above.   - she has a brace at home. Patient may use brace as needed for symptomatic relief.    - Patient should notify the office of any signs or symptoms of infection including fevers, erythema, purulent drainage, increasing pain.    - Follow up for suture removal     POST OPERATIVE INSTRUCTIONS - Visit #1    BANDAGES/DRESSING/BATHING:  We have removed the dressing, cleaned your incision, and put on a band-aid at your first post op visit today. You may clean the incision daily as we did today. Keep the incision clean and dry. When showering, you may cover the incision with a plastic bag/umbrella bag.   Do not use Neosporin or other topical ointments or creams on the incision. If you are concerned about any redness or drainage of the incisions, please call the office.    SWELLING  Some swelling of your arm, hand and fingers is normal. The swelling can be decreased by  elevating your arm on a few pillows when lying down. Swelling can be further controlled by cold therapy over the surgical site. Flexion/extension of the fingers (opening and closing your hands) will also help to relieve swelling and prevent stiffness.    ACTIVITY  You may use the hand and wrist as tolerated for light activity. You are encouraged to bend the wrist, elbow and fingers as soon as the initial surgical dressing is removed. Avoid lifting, pushing, or pulling any object greater than 5-10 pounds for the first 10-14 days.     BRACE  Wear your brace or splint as directed.    MEDICATIONS  Take pain medicines exactly as directed.  If the doctor gave you a  prescription medicine for pain, take it as prescribed.  If you are not taking a prescription pain medicine, take an over-the-counter medicine such as acetaminophen (Tylenol), ibuprofen (Advil, Motrin), or naproxen (Aleve) as long as you do not have an allergy or medical condition that prevents you from taking them.  Do not take two or more pain medicines at the same time unless the doctor told you to. Many pain medicines have acetaminophen, which is Tylenol. Too much acetaminophen (Tylenol) can be harmful.    FOLLOW -UP  You should be scheduled for a post-op appointment within the 10-14 days following surgery, at which time we will review your surgery, remove sutures and evaluate incisions, review your post-operative program and answer any of your questions.          Falguni Lindquist PA-C   [facility]   SUBJECTIVE:      Patient ID: Cassandra Campos is a 73 y.o. female.    HPI: Ms. Campos is here today for post-operative visit #2.  She is 12 days status post RELEASE, CARPAL TUNNEL (Right) and median nerve epineural neural lysis by Dr. Oswald on 2/9/24. She reports that she is doing well.  Pain is 3/10.  She is taking tylenol as needed for pain. She is ready to schedule a left carpal tunnel release. She has been compliant with postop instructions and keeping the extremity dry. She denies fever, chills, and sweats since the time of the surgery.     Interval hx 2/13/24: Ms. Campos is here today for post-operative visit #1.  She is 4 days status post RELEASE, CARPAL TUNNEL (Right) and median nerve epineural neural lysis by Dr. Oswald on 2/9/24. She reports that she is doing well.  Pain is 5/10. She is taking the Norco for pain as prescribed.  She has been compliant with postop instructions and keeping the extremity dry. She denies fever, chills, and sweats since the time of the surgery.     Past Medical History:   Diagnosis Date    Arthritis     hands    Bilateral bunions     Borderline glaucoma     De Quervain's  disease (radial styloid tenosynovitis)     Gastritis     upper GI 2017    Hydradenitis     Hyperlipidemia     Hypertension     Insomnia     Migraines 2000    Nasal septum perforation     Obesity     Pneumonia     Restrictive airway disease     Sleep apnea     SVT (supraventricular tachycardia) 2013    Trigger finger     Type 2 diabetes mellitus      am 2024     Past Surgical History:   Procedure Laterality Date    AXILLARY HIDRADENITIS EXCISION Bilateral     BONE EXOSTOSIS EXCISION Right 2018    Procedure: EXCISION, EXOSTOSIS;  Surgeon: Jayro Pedraza Sr., MD;  Location: [facility] OR;  Service: Orthopedics;  Laterality: Right;    BREAST BIOPSY Bilateral     both benign    BREAST SURGERY  1998    CARPAL TUNNEL RELEASE      bilateral    CARPAL TUNNEL RELEASE Right 2024    Procedure: RELEASE, CARPAL TUNNEL;  Surgeon: Jaime Oswald MD;  Location: [facility];  Service: Orthopedics;  Laterality: Right;    CATARACT EXTRACTION Bilateral     OU     SECTION  1979    CHOLECYSTECTOMY  2014    COLONOSCOPY N/A 10/02/2020    Procedure: COLONOSCOPY;  Surgeon: Tushar Edwards MD;  Location: [facility];  Service: Endoscopy;  Laterality: N/A;    COLONOSCOPY W/ POLYPECTOMY  10/02/2020    Polyps x3, repeat 5 years; Tushar Edwards MD     CYST REMOVAL  2015    sebaceous cyst removed from face    DE QUERVAIN'S RELEASE Left 2020    Procedure: RELEASE, HAND, FOR DEQUERVAIN'S TENOSYNOVITIS;  Surgeon: Jaime Oswald MD;  Location: [facility] OR;  Service: Orthopedics;  Laterality: Left;    DE QUERVAIN'S RELEASE Right 2020    Procedure: RELEASE, HAND, FOR DEQUERVAIN'S TENOSYNOVITIS;  Surgeon: Jaime Oswald MD;  Location: [facility];  Service: Orthopedics;  Laterality: Right;    EYE SURGERY      gastric sleeve  2017    Dr. Watson    KNEE SURGERY Right     OLECRANON BURSECTOMY Right 2018    Procedure: BURSECTOMY, OLECRANON;  Surgeon: Jayro Pedraza Sr., MD;   Location: [facility] OR;  Service: Orthopedics;  Laterality: Right;    SURGICAL REMOVAL OF BUNION WITH OSTEOTOMY OF METATARSAL BONE Left 05/10/2019    Procedure: BUNIONECTOMY, WITH METATARSAL OSTEOTOMY;  Surgeon: Srinivasan Villanueva DPM;  Location: [facility] OR;  Service: Podiatry;  Laterality: Left;    SURGICAL REMOVAL OF BUNION WITH OSTEOTOMY OF METATARSAL BONE Right 2019    Procedure: BUNIONECTOMY, WITH METATARSAL OSTEOTOMY;  Surgeon: Srinivasan Villanueva DPM;  Location: [facility] OR;  Service: Podiatry;  Laterality: Right;    TONSILLECTOMY, ADENOIDECTOMY  1980s    TRIGGER FINGER RELEASE Right 2015    Dr. Pedraza     Family History   Problem Relation Age of Onset    Prostate cancer Brother     Diabetes Maternal Aunt     Diabetes Cousin     Hypertension Maternal Grandmother      Social History     Socioeconomic History    Marital status:     Number of children: 1   Occupational History    Occupation:  aid   Tobacco Use    Smoking status: Former     Current packs/day: 0.00     Average packs/day: 0.5 packs/day for 42.0 years (21.0 ttl pk-yrs)     Types: Cigarettes     Start date: 1970     Quit date: 2012     Years since quittin.1     Passive exposure: Past    Smokeless tobacco: Never   Substance and Sexual Activity    Alcohol use: Not Currently     Alcohol/week: 1.0 standard drink of alcohol     Types: 1 Glasses of wine per week     Comment: Glass red wine once a every 2 weeks    Drug use: Never    Sexual activity: Not Currently     Partners: Female     Birth control/protection: Abstinence, None   Social History Narrative    Single, part-time teacher. Masters degree biology.      Social Determinants of Health     Financial Resource Strain: Low Risk  (2023)    Overall Financial Resource Strain (CARDIA)     Difficulty of Paying Living Expenses: Not hard at all   Food Insecurity: Food Insecurity Present (2023)    Hunger Vital Sign     Worried About Running Out of Food in the Last  Year: Never true     Ran Out of Food in the Last Year: Sometimes true   Transportation Needs: No Transportation Needs (12/19/2023)    PRAPARE - Transportation     Lack of Transportation (Medical): No     Lack of Transportation (Non-Medical): No   Physical Activity: Insufficiently Active (12/19/2023)    Exercise Vital Sign     Days of Exercise per Week: 3 days     Minutes of Exercise per Session: 20 min   Stress: No Stress Concern Present (12/19/2023)    [employer] [place] [employer] - Occupational Stress Questionnaire     Feeling of Stress : Not at all   Social Connections: Unknown (12/19/2023)    Social Connection and Isolation Panel [NHANES]     Frequency of Communication with Friends and Family: Never     Frequency of Social Gatherings with Friends and Family: More than three times a week     Active Member of Clubs or Organizations: Patient declined     Attends Club or Organization Meetings: 1 to 4 times per year     Marital Status:    Housing Stability: Low Risk  (12/19/2023)    Housing Stability Vital Sign     Unable to Pay for Housing in the Last Year: No     Number of Places Lived in the Last Year: 1     Unstable Housing in the Last Year: No     Current Discharge Medication List        CONTINUE these medications which have NOT CHANGED    Details   amitriptyline (ELAVIL) 100 MG tablet Take 1 tablet (100 mg total) by mouth every evening.  Qty: 90 tablet, Refills: 1      cholecalciferol, vitamin D3, (VITAMIN D3) 25 mcg (1,000 unit) capsule Take 1,000 Units by mouth once daily.      LORazepam (ATIVAN) 1 MG tablet Take 1 tablet (1 mg total) by mouth 2 (two) times daily.  Qty: 60 tablet, Refills: 2    Comments: Not to exceed 3 additional fills before 01/02/2024      losartan (COZAAR) 25 MG tablet TAKE 1 TABLET BY MOUTH ONCE  DAILY  Qty: 90 tablet, Refills: 2    Comments: Requesting 1 year supply      metoprolol succinate (TOPROL-XL) 50 MG 24 hr tablet TAKE 1 TABLET BY MOUTH ONCE  DAILY  Qty: 90  tablet, Refills: 2    Comments: Please send a replace/new response with 90-Day Supply if appropriate to maximize member benefit. Requesting 1 year supply. - .  Associated Diagnoses: Secondary hypertension      omeprazole (PRILOSEC) 20 MG capsule TAKE 1 CAPSULE BY MOUTH ONCE  DAILY  Qty: 90 capsule, Refills: 2    Comments: Please send a replace/new response with 90-Day Supply if appropriate to maximize member benefit. Requesting 1 year supply.      semaglutide (OZEMPIC) 2 mg/dose (8 mg/3 mL) PnIj Inject 2 mg into the skin every 7 days.  Qty: 3 mL, Refills: 2    Associated Diagnoses: Controlled type 2 diabetes mellitus without complication, without long-term current use of insulin      albuterol (PROVENTIL/VENTOLIN HFA) 90 mcg/actuation inhaler INHALE 2 PUFFS INTO THE LUNGS EVERY 4 HOURS AS NEEDED FOR WHEEZING OR SHORTNESS OF BREATH.  Qty: 18 g, Refills: 1    Associated Diagnoses: Acute bronchitis, unspecified organism      blood glucose control, high (TRUE METRIX LEVEL 3) Soln 1 each by Other route once daily.  Qty: 1 each, Refills: 5      blood sugar diagnostic (ACCU-CHEK GUIDE TEST STRIPS) Strp USE TO TEST TWICE A DAY  Qty: 200 strip, Refills: 3    Associated Diagnoses: Controlled type 2 diabetes mellitus without complication, with long-term current use of insulin      blood-glucose meter Misc 1 each by Misc.(Non-Drug; Combo Route) route 2 (two) times a day.  Qty: 1 each, Refills: 1    Comments: AccuChek  Associated Diagnoses: Controlled type 2 diabetes mellitus without complication, with long-term current use of insulin      eszopiclone (LUNESTA) 3 mg Tab Take 1 tablet (3 mg total) by mouth every evening.  Qty: 30 tablet, Refills: 2      HYDROcodone-acetaminophen (NORCO) 5-325 mg per tablet Take 1 tablet by mouth every 6 (six) hours as needed for Pain.  Qty: 15 tablet, Refills: 0    Comments: Quantity prescribed more than 7 day supply? No      lancing device with lancets (ACCU-CHEK SOFT DEV LANCETS) Kit 1 each by  "Misc.(Non-Drug; Combo Route) route 2 (two) times a day.  Qty: 1 each, Refills: 4    Associated Diagnoses: Controlled type 2 diabetes mellitus without complication, with long-term current use of insulin      RSVPreF3 antigen-AS01E, PF, (AREXVY, PF,) 120 mcg/0.5 mL SusR vaccine Inject into the muscle.  Qty: 0.5 mL, Refills: 0      TRUEPLUS LANCETS 33 gauge Misc TEST BLOOD SUGAR ONE TIME DAILY  Qty: 100 each, Refills: 3           Review of patient's allergies indicates:  No Known Allergies    OBJECTIVE:     Physical exam:    Vitals:    03/08/24 0547   BP: 120/82   Pulse: 93   Resp: 18   Temp: 97.5 °F (36.4 °C)   TempSrc: Temporal   Weight: 80 kg (176 lb 5.9 oz)   Height: 5' 3" (1.6 m)     Vital signs are stable, patient is afebrile.  Patient is well dressed and well groomed, no acute distress.  Alert and oriented to person, place, and time.    Right UE:  Incision is clean, dry, and intact. Mild scabbing noted  There is no erythema or exudate. There is no sign of any infection.   Mild swelling locally  She is NVI.   Sutures in place.   2+ pulses noted.  Cap refill <2 seconds  Motor intact to hand    EMG 1/17/24  IMPRESSION  ABNORMAL study  2. There is electrodiagnostic evidence of a moderate demyelinating median neuropathy (Carpal tunnel syndrome) across the right and a mild demyelinating CTS across the left wrist     ASSESSMENT         Encounter Diagnoses   Name Primary?    S/P carpal tunnel release Yes    Left carpal tunnel syndrome             12 days status post RELEASE, CARPAL TUNNEL (Right) and median nerve epineural neural lysis     PLAN:           Cassandra was seen today for post-op evaluation.    Diagnoses and all orders for this visit:    S/P carpal tunnel release    Left carpal tunnel syndrome    - PO instruction reviewed and provided to patient.   - Dr. Oswald also saw the patient face to face and counseled her on a left carpal tunnel release. Risks complications and alternatives were discussed including " the risk of infection, anesthetic risk, injury to nerves and vessels, loss of motion, and possible need for additional surgeries were discussed. She seems to understand and agree that surgery. All questions were answered.   - The incision was cleaned with hydrogen peroxide. Sutures removed with no difficulty. Steri-Strips applied.   - Patient may use brace as needed for symptomatic relief.    - Patient should notify the office of any signs or symptoms of infection including fevers, erythema, purulent drainage, increasing pain.    - Follow up PRN     POST OPERATIVE VISIT INSTRUCTIONS - Visit #2    1. No soaking the incision for at least 7-10 days. You may get it wet in the shower and use regular soap.    2. To avoid a hard, painful scar, we recommend you use Mederma and/or Silicone Scar patches. You may also use Cocoa Butter, Vitamin E oil, Coconut oil, etc.    You may start this 5-7 days after stitches are removed and when wound is completely closed.    - Mederma scar cream: scar massage over incision 1-2 times per day. You may use topical lotion or Vitamin E oil. Massage an additional few times a day to help the scar become soft and less sensitive.    - Silicone Scar Patches: cut and place over the incision, then massage over the patch to help with scarring and sensitivity. Can be reused up to 10 days if you rinse it clean, let it dry out, then reapply.    Brands: Mepiform Silicone Scar Sheets (recommended), Scar Away, [employer] brand or generic pharmacy brand ([facility], [facility], [facility])    3. Continue range of motion exercises as instructed at todays visit.    4. You may take Tylenol 500mg and/or Ibuprofen 400mg every 4-6 hours with food for pain as tolerated as long as you do not have an allergy or medical condition that prevents you from taking them.    5. Therapy recommended: none at this time    6. Immobilization: brace as needed    7. Lifting restrictions: start light at about 10lb and increase gradually as  tolerated    8. Follow up: prn Randi Seneca, PA-C [facility]

## 2024-03-08 NOTE — TRANSFER OF CARE
"Anesthesia Transfer of Care Note    Patient: Cassandra Campos    Procedure(s) Performed: Procedure(s) (LRB):  RELEASE, CARPAL TUNNEL (Left)    Patient location: PACU    Anesthesia Type: MAC    Transport from OR: Transported from OR on room air with adequate spontaneous ventilation    Post pain: adequate analgesia    Post assessment: no apparent anesthetic complications    Post vital signs: stable    Level of consciousness: awake and responds to stimulation    Nausea/Vomiting: no nausea/vomiting    Complications: none    Transfer of care protocol was followed      Last vitals: Visit Vitals  /82 (BP Location: Right arm, Patient Position: Sitting)   Pulse 93   Temp 36.4 °C (97.5 °F) (Temporal)   Resp 18   Ht 5' 3" (1.6 m)   Wt 80 kg (176 lb 5.9 oz)   Breastfeeding No   BMI 31.24 kg/m²     "

## 2024-03-08 NOTE — DISCHARGE SUMMARY
[facility] (Hospital)  Discharge Note  Short Stay    Procedure(s) (LRB):  RELEASE, CARPAL TUNNEL (Left)      OUTCOME: Patient tolerated treatment/procedure well without complication and is now ready for discharge.    DISPOSITION: Home or Self Care    FINAL DIAGNOSIS:  Left carpal tunnel syndrome    FOLLOWUP: In clinic    DISCHARGE INSTRUCTIONS:    Discharge Procedure Orders   Diet general     Call MD for:  temperature >100.4     Call MD for:  persistent nausea and vomiting     Call MD for:  severe uncontrolled pain     Call MD for:  difficulty breathing, headache or visual disturbances     Call MD for:  redness, tenderness, or signs of infection (pain, swelling, redness, odor or green/yellow discharge around incision site)     Call MD for:  hives     Call MD for:  persistent dizziness or light-headedness     Call MD for:  extreme fatigue        TIME SPENT ON DISCHARGE:  20 minutes

## 2024-03-11 VITALS
SYSTOLIC BLOOD PRESSURE: 133 MMHG | DIASTOLIC BLOOD PRESSURE: 81 MMHG | TEMPERATURE: 98 F | RESPIRATION RATE: 19 BRPM | OXYGEN SATURATION: 97 % | HEART RATE: 89 BPM | HEIGHT: 63 IN | BODY MASS INDEX: 31.25 KG/M2 | WEIGHT: 176.38 LBS

## 2024-03-12 ENCOUNTER — HOSPITAL ENCOUNTER (OUTPATIENT)
Dept: RADIOLOGY | Facility: HOSPITAL | Age: 74
Discharge: HOME | End: 2024-03-12
Attending: SURGERY
Payer: MEDICARE

## 2024-03-12 ENCOUNTER — OFFICE VISIT (OUTPATIENT)
Dept: ORTHOPEDICS | Facility: CLINIC | Age: 74
End: 2024-03-12
Payer: MEDICARE

## 2024-03-12 VITALS — WEIGHT: 176.38 LBS | HEIGHT: 63 IN | BODY MASS INDEX: 31.25 KG/M2

## 2024-03-12 DIAGNOSIS — Z98.890 S/P CARPAL TUNNEL RELEASE: Primary | ICD-10-CM

## 2024-03-12 DIAGNOSIS — M25.561 PAIN IN BOTH KNEES, UNSPECIFIED CHRONICITY: Primary | ICD-10-CM

## 2024-03-12 DIAGNOSIS — M25.562 PAIN IN BOTH KNEES, UNSPECIFIED CHRONICITY: Primary | ICD-10-CM

## 2024-03-12 DIAGNOSIS — M25.552 BILATERAL HIP PAIN: ICD-10-CM

## 2024-03-12 DIAGNOSIS — R91.8 LUNG MASS: ICD-10-CM

## 2024-03-12 DIAGNOSIS — M25.551 BILATERAL HIP PAIN: ICD-10-CM

## 2024-03-12 PROCEDURE — A9552 F18 FDG: HCPCS | Performed by: SURGERY

## 2024-03-12 PROCEDURE — 99999 PR PBB SHADOW E&M-EST. PATIENT-LVL III: CPT | Mod: PBBFAC,,,

## 2024-03-12 PROCEDURE — 78815 PET IMAGE W/CT SKULL-THIGH: CPT | Mod: 26,PS,, | Performed by: RADIOLOGY

## 2024-03-12 PROCEDURE — 1159F MED LIST DOCD IN RCRD: CPT | Mod: CPTII,S$GLB,,

## 2024-03-12 PROCEDURE — 78815 PET IMAGE W/CT SKULL-THIGH: CPT | Mod: TC

## 2024-03-12 PROCEDURE — 4010F ACE/ARB THERAPY RXD/TAKEN: CPT | Mod: CPTII,S$GLB,,

## 2024-03-12 PROCEDURE — 99024 POSTOP FOLLOW-UP VISIT: CPT | Mod: S$GLB,,,

## 2024-03-12 PROCEDURE — 1125F AMNT PAIN NOTED PAIN PRSNT: CPT | Mod: CPTII,S$GLB,,

## 2024-03-12 RX ORDER — FLUDEOXYGLUCOSE F18 500 MCI/ML
10.9 INJECTION INTRAVENOUS
Status: COMPLETED | OUTPATIENT
Start: 2024-03-12 | End: 2024-03-12

## 2024-03-12 RX ADMIN — FLUDEOXYGLUCOSE F-18 10.9 MILLICURIE: 500 INJECTION INTRAVENOUS at 12:03

## 2024-03-12 NOTE — PROGRESS NOTES
SUBJECTIVE:      Patient ID: Cassandra Campos is a 73 y.o. female.    HPI: Ms. Campos is here today for post-operative visit #1.  She is 4 days status post left carpal tunnel release by Dr. Oswald on 3/8/24. She reports that she is doing well.  Pain is 3/10.  She is taking the Percocet as directed for pain.  She has been compliant with postop instructions and keeping the extremity dry. She denies fever, chills, and sweats since the time of the surgery.   Of note, she also admits to left knee and left groin pain for about 2 weeks.  Upon chart review, the patient has a large umbilical hernia on the left side and has been seen by General surgery for this.  She wishes to be evaluated for her knee pain.    Past Medical History:   Diagnosis Date    Arthritis     hands    Bilateral bunions     Borderline glaucoma     De Quervain's disease (radial styloid tenosynovitis)     Gastritis     upper GI 2/2017    Hydradenitis     Hyperlipidemia     Hypertension     Insomnia     Migraines 02/01/2000    Nasal septum perforation     Obesity     Pneumonia     Restrictive airway disease     Sleep apnea     SVT (supraventricular tachycardia) 09/2013    Trigger finger     Type 2 diabetes mellitus 2012     am 02/01/2024     Past Surgical History:   Procedure Laterality Date    AXILLARY HIDRADENITIS EXCISION Bilateral     BONE EXOSTOSIS EXCISION Right 07/25/2018    Procedure: EXCISION, EXOSTOSIS;  Surgeon: Jayro Pedraza Sr., MD;  Location: [facility] OR;  Service: Orthopedics;  Laterality: Right;    BREAST BIOPSY Bilateral     both benign    BREAST SURGERY  07/1998    CARPAL TUNNEL RELEASE      bilateral    CARPAL TUNNEL RELEASE Right 2/9/2024    Procedure: RELEASE, CARPAL TUNNEL;  Surgeon: Jaime Oswald MD;  Location: [facility] OR;  Service: Orthopedics;  Laterality: Right;    CARPAL TUNNEL RELEASE Left 3/8/2024    Procedure: RELEASE, CARPAL TUNNEL;  Surgeon: Jaime Oswald MD;  Location: [facility] OR;  Service: Orthopedics;   Laterality: Left;    CATARACT EXTRACTION Bilateral     OU     SECTION  1979    CHOLECYSTECTOMY  2014    COLONOSCOPY N/A 10/02/2020    Procedure: COLONOSCOPY;  Surgeon: Tushar Edwards MD;  Location: [facility];  Service: Endoscopy;  Laterality: N/A;    COLONOSCOPY W/ POLYPECTOMY  10/02/2020    Polyps x3, repeat 5 years; Tushar Edwards MD     CYST REMOVAL  2015    sebaceous cyst removed from face    DE QUERVAIN'S RELEASE Left 2020    Procedure: RELEASE, HAND, FOR DEQUERVAIN'S TENOSYNOVITIS;  Surgeon: Jaime Oswald MD;  Location: [facility];  Service: Orthopedics;  Laterality: Left;    DE QUERVAIN'S RELEASE Right 2020    Procedure: RELEASE, HAND, FOR DEQUERVAIN'S TENOSYNOVITIS;  Surgeon: Jaime Oswald MD;  Location: [facility];  Service: Orthopedics;  Laterality: Right;    EYE SURGERY      gastric sleeve  2017    Dr. Watson    KNEE SURGERY Right     OLECRANON BURSECTOMY Right 2018    Procedure: BURSECTOMY, OLECRANON;  Surgeon: Jayro Pedraza Sr., MD;  Location: [facility];  Service: Orthopedics;  Laterality: Right;    SURGICAL REMOVAL OF BUNION WITH OSTEOTOMY OF METATARSAL BONE Left 05/10/2019    Procedure: BUNIONECTOMY, WITH METATARSAL OSTEOTOMY;  Surgeon: Srinivasan Villanueva DPM;  Location: [facility];  Service: Podiatry;  Laterality: Left;    SURGICAL REMOVAL OF BUNION WITH OSTEOTOMY OF METATARSAL BONE Right 2019    Procedure: BUNIONECTOMY, WITH METATARSAL OSTEOTOMY;  Surgeon: Srinivasan Villanueva DPM;  Location: [facility];  Service: Podiatry;  Laterality: Right;    TONSILLECTOMY, ADENOIDECTOMY  1980s    TRIGGER FINGER RELEASE Right 2015    Dr. Pedraza     Family History   Problem Relation Age of Onset    Prostate cancer Brother     Diabetes Maternal Aunt     Diabetes Cousin     Hypertension Maternal Grandmother      Social History     Socioeconomic History    Marital status:     Number of children: 1   Occupational History    Occupation:  aid    Tobacco Use    Smoking status: Former     Current packs/day: 0.00     Average packs/day: 0.5 packs/day for 42.0 years (21.0 ttl pk-yrs)     Types: Cigarettes     Start date: 1970     Quit date: 2012     Years since quittin.2     Passive exposure: Past    Smokeless tobacco: Never   Substance and Sexual Activity    Alcohol use: Not Currently     Alcohol/week: 1.0 standard drink of alcohol     Types: 1 Glasses of wine per week     Comment: Glass red wine once a every 2 weeks    Drug use: Never    Sexual activity: Not Currently     Partners: Female     Birth control/protection: Abstinence, None   Social History Narrative    Single, part-time teacher. Masters degree biology.      Social Determinants of Health     Financial Resource Strain: Low Risk  (2023)    Overall Financial Resource Strain (CARDIA)     Difficulty of Paying Living Expenses: Not hard at all   Food Insecurity: Food Insecurity Present (2023)    Hunger Vital Sign     Worried About Running Out of Food in the Last Year: Never true     Ran Out of Food in the Last Year: Sometimes true   Transportation Needs: No Transportation Needs (2023)    PRAPARE - Transportation     Lack of Transportation (Medical): No     Lack of Transportation (Non-Medical): No   Physical Activity: Insufficiently Active (2023)    Exercise Vital Sign     Days of Exercise per Week: 3 days     Minutes of Exercise per Session: 20 min   Stress: No Stress Concern Present (2023)    [employer] [place] [employer] - Occupational Stress Questionnaire     Feeling of Stress : Not at all   Social Connections: Unknown (2023)    Social Connection and Isolation Panel [NHANES]     Frequency of Communication with Friends and Family: Never     Frequency of Social Gatherings with Friends and Family: More than three times a week     Active Member of Clubs or Organizations: Patient declined     Attends Club or Organization Meetings: 1 to 4 times per  year     Marital Status:    Housing Stability: Low Risk  (12/19/2023)    Housing Stability Vital Sign     Unable to Pay for Housing in the Last Year: No     Number of Places Lived in the Last Year: 1     Unstable Housing in the Last Year: No     Medication List with Changes/Refills   Current Medications    ALBUTEROL (PROVENTIL/VENTOLIN HFA) 90 MCG/ACTUATION INHALER    INHALE 2 PUFFS INTO THE LUNGS EVERY 4 HOURS AS NEEDED FOR WHEEZING OR SHORTNESS OF BREATH.    AMITRIPTYLINE (ELAVIL) 100 MG TABLET    Take 1 tablet (100 mg total) by mouth every evening.    BLOOD GLUCOSE CONTROL, HIGH (TRUE METRIX LEVEL 3) SOLN    1 each by Other route once daily.    BLOOD SUGAR DIAGNOSTIC (ACCU-CHEK GUIDE TEST STRIPS) STRP    USE TO TEST TWICE A DAY    BLOOD-GLUCOSE METER MISC    1 each by Misc.(Non-Drug; Combo Route) route 2 (two) times a day.    CHOLECALCIFEROL, VITAMIN D3, (VITAMIN D3) 25 MCG (1,000 UNIT) CAPSULE    Take 1,000 Units by mouth once daily.    ESZOPICLONE (LUNESTA) 3 MG TAB    Take 1 tablet (3 mg total) by mouth every evening.    HYDROCODONE-ACETAMINOPHEN (NORCO) 5-325 MG PER TABLET    Take 1 tablet by mouth every 6 (six) hours as needed for Pain.    LANCING DEVICE WITH LANCETS (ACCU-CHEK SOFT DEV LANCETS) KIT    1 each by Misc.(Non-Drug; Combo Route) route 2 (two) times a day.    LORAZEPAM (ATIVAN) 1 MG TABLET    Take 1 tablet (1 mg total) by mouth 2 (two) times daily.    LOSARTAN (COZAAR) 25 MG TABLET    TAKE 1 TABLET BY MOUTH ONCE  DAILY    METOPROLOL SUCCINATE (TOPROL-XL) 50 MG 24 HR TABLET    TAKE 1 TABLET BY MOUTH ONCE  DAILY    OMEPRAZOLE (PRILOSEC) 20 MG CAPSULE    TAKE 1 CAPSULE BY MOUTH ONCE  DAILY    OXYCODONE-ACETAMINOPHEN (PERCOCET)  MG PER TABLET    Take 1 tablet by mouth every 6 (six) hours as needed for Pain.    RSVPREF3 ANTIGEN-AS01E, PF, (AREXVY, PF,) 120 MCG/0.5 ML SUSR VACCINE    Inject into the muscle.    SEMAGLUTIDE (OZEMPIC) 2 MG/DOSE (8 MG/3 ML) PNIJ    Inject 2 mg into the skin  "every 7 days.    TRUEPLUS LANCETS 33 GAUGE MISC    TEST BLOOD SUGAR ONE TIME DAILY     Review of patient's allergies indicates:  No Known Allergies    OBJECTIVE:     Physical exam:    Vitals:    03/12/24 1006   Weight: 80 kg (176 lb 5.9 oz)   Height: 5' 3" (1.6 m)   PainSc:   3   PainLoc: Wrist     Vital signs are stable, patient is afebrile.  Patient is well dressed and well groomed, no acute distress.  Alert and oriented to person, place, and time.    Left UE:  Post op dressing taken down.   Incision is clean, dry, and intact.   There is no erythema or exudate. There is no sign of any infection.   Mild swelling to hand.  Mild ecchymosis locally  She is NVI.   Sutures in place.   2+ pulses noted.  Cap refill <2 seconds  Motor intact to hand    ASSESSMENT         Encounter Diagnosis   Name Primary?    S/P carpal tunnel release Yes            4 days status post left carpal tunnel release    PLAN:           Cassandra was seen today for post-op evaluation.    Diagnoses and all orders for this visit:    S/P carpal tunnel release      - PO instruction reviewed and provided to patient  - The incision was cleaned with hydrogen peroxide and normal saline. A sterile Band-Aid was applied.   - Patient may clean the incision daily as above.   - carpal tunnel brace provided today.  Patient may use brace as needed for symptomatic relief.    - Patient should notify the office of any signs or symptoms of infection including fevers, erythema, purulent drainage, increasing pain.    - appointment made for left knee pain  - Follow up for suture removal     POST OPERATIVE INSTRUCTIONS - Visit #1    BANDAGES/DRESSING/BATHING:  We have removed the dressing, cleaned your incision, and put on a band-aid at your first post op visit today. You may clean the incision daily as we did today. Keep the incision clean and dry. When showering, you may cover the incision with a plastic bag/umbrella bag.   Do not use Neosporin or other topical " ointments or creams on the incision. If you are concerned about any redness or drainage of the incisions, please call the office.    SWELLING  Some swelling of your arm, hand and fingers is normal. The swelling can be decreased by  elevating your arm on a few pillows when lying down. Swelling can be further controlled by cold therapy over the surgical site. Flexion/extension of the fingers (opening and closing your hands) will also help to relieve swelling and prevent stiffness.    ACTIVITY  You may use the hand and wrist as tolerated for light activity. You are encouraged to bend the wrist, elbow and fingers as soon as the initial surgical dressing is removed. Avoid lifting, pushing, or pulling any object greater than 5-10 pounds for the first 10-14 days.     BRACE  Wear your brace or splint as directed.    MEDICATIONS  Take pain medicines exactly as directed.  If the doctor gave you a prescription medicine for pain, take it as prescribed.  If you are not taking a prescription pain medicine, take an over-the-counter medicine such as acetaminophen (Tylenol), ibuprofen (Advil, Motrin), or naproxen (Aleve) as long as you do not have an allergy or medical condition that prevents you from taking them.  Do not take two or more pain medicines at the same time unless the doctor told you to. Many pain medicines have acetaminophen, which is Tylenol. Too much acetaminophen (Tylenol) can be harmful.    FOLLOW -UP  You should be scheduled for a post-op appointment within the 10-14 days following surgery, at which time we will review your surgery, remove sutures and evaluate incisions, review your post-operative program and answer any of your questions.          Falguni Lindquist PA-C   [facility]

## 2024-03-13 ENCOUNTER — TELEPHONE (OUTPATIENT)
Dept: PULMONOLOGY | Facility: CLINIC | Age: 74
End: 2024-03-13
Payer: MEDICARE

## 2024-03-13 ENCOUNTER — TELEPHONE (OUTPATIENT)
Dept: SURGERY | Facility: HOSPITAL | Age: 74
End: 2024-03-13
Payer: MEDICARE

## 2024-03-13 ENCOUNTER — TELEPHONE (OUTPATIENT)
Dept: SURGERY | Facility: CLINIC | Age: 74
End: 2024-03-13
Payer: MEDICARE

## 2024-03-13 NOTE — TELEPHONE ENCOUNTER
----- Message from Isak Maharaj sent at 3/13/2024  3:07 PM CDT -----  Contact: [phone]  Patient is calling for CT results. Please call patient at [phone]. Thanks KB

## 2024-03-13 NOTE — TELEPHONE ENCOUNTER
Attempted to call patient her PET scan results.    Not answer her phone.      Message was left for her to call the office

## 2024-03-14 ENCOUNTER — TELEPHONE (OUTPATIENT)
Dept: PULMONOLOGY | Facility: CLINIC | Age: 74
End: 2024-03-14
Payer: MEDICARE

## 2024-03-14 ENCOUNTER — TELEPHONE (OUTPATIENT)
Dept: SURGERY | Facility: CLINIC | Age: 74
End: 2024-03-14
Payer: MEDICARE

## 2024-03-14 NOTE — TELEPHONE ENCOUNTER
----- Message from Silas Rizvi sent at 3/14/2024  3:06 PM CDT -----  Contact: Cassandra  Type:  Sooner Apoointment Request    Caller is requesting a sooner appointment.  Caller declined first available appointment listed below.  Caller will not accept being placed on the waitlist and is requesting a message be sent to doctor.  Name of Caller:Cassandra  When is the first available appointment?05/28  Symptoms:pulmonary  Would the patient rather a call back or a response via [facility]? call  Best Call Back Number:[phone]   Additional Information: needs to be scheduled sooner per Dr. George

## 2024-03-14 NOTE — TELEPHONE ENCOUNTER
Patient was informed of her PET scan results in the need to see pulmonary for further evaluation, biopsies and recommendations of treatment of a likely lung cancer        Reading Physician Reading Date Result Priority   Juan Pablo Vega MD  [phone] 3/12/2024 Routine     Narrative & Impression  EXAMINATION:  NM PET CT FDG SKULL BASE TO MID THIGH     CLINICAL HISTORY:  Lung nodule, > 8mm; Other nonspecific abnormal finding of lung field     TECHNIQUE:  10.9 mCi of F18-FDG was administered intravenously in the left antecubital fossa.  After an approximately 60 min distribution time, PET/CT images were acquired from the skull base to the mid thigh. Transmission images were acquired to correct for attenuation using a whole body low-dose CT scan without contrast with the arms positioned above the head. Glycemia at the time of injection was 97 mg/dL.     COMPARISON:  Chest CT, 02/16/2024.  PET CT, 11/06/2017.     FINDINGS:  Quality of the study: Adequate.     SUV max of the liver parenchyma is 2.7     Neck: No abnormal FDG avidity.  No lymphadenopathy.     Chest: Lingular subpleural pulmonary nodule measuring 2.3 x 2.2 cm with SUV max 8.4.     Subpleural posterior left lower lobe pulmonary nodule measuring 1.4 x 1.1 cm with SUV max 1.4.     Left hilar lymph node measuring 1.8 x 1.3 cm with SUV max 4.7.     Left axillary lymph node measuring 1.9 x 1.5 cm with eccentric cortical thickening with SUV max 3.6.     Abdomen/pelvis: No abnormal FDG avidity.  No ascites or lymphadenopathy.     Skeletal structures: No abnormal FDG avidity.  No focal lytic or sclerotic lesion.     Physiologic uptake of the tracer is present within the brain, salivary glands, myocardium, GI and  tracts.     Incidental CT findings: N/A     Impression:     1. Markedly FDG avid lingular subpleural pulmonary nodule concerning for primary lung cancer.  2. Mildly FDG avid left hilar lymph node measuring 1.8 x 1.3 cm, nonspecific, but could be a  metastatic left hilar lymph node.  3. Mildly FDG avid left axillary lymph node as described above, nonspecific.  4. No abnormal FDG avidity in the neck, abdomen, pelvis, or osseous structures.  All CT scans at this facility are performed  using dose modulation techniques as appropriate to performed exam including the following:  automated exposure control; adjustment of mA and/or kV according to the patients size (this includes techniques or standardized protocols for targeted exams where dose is matched to indication/reason for exam: i.e. extremities or head);  iterative reconstruction technique.        Electronically signed by: Juan Pablo Vega MD  Date:                                            03/12/2024  Time:                                           18:23

## 2024-03-18 ENCOUNTER — OFFICE VISIT (OUTPATIENT)
Dept: PULMONOLOGY | Facility: CLINIC | Age: 74
End: 2024-03-18
Payer: MEDICARE

## 2024-03-18 VITALS
OXYGEN SATURATION: 97 % | RESPIRATION RATE: 17 BRPM | HEIGHT: 63 IN | SYSTOLIC BLOOD PRESSURE: 122 MMHG | WEIGHT: 171.75 LBS | BODY MASS INDEX: 30.43 KG/M2 | DIASTOLIC BLOOD PRESSURE: 80 MMHG | HEART RATE: 118 BPM

## 2024-03-18 DIAGNOSIS — R91.1 SOLITARY PULMONARY NODULE: ICD-10-CM

## 2024-03-18 DIAGNOSIS — R91.8 PULMONARY NODULES: Primary | ICD-10-CM

## 2024-03-18 DIAGNOSIS — I10 PRIMARY HYPERTENSION: ICD-10-CM

## 2024-03-18 DIAGNOSIS — Z12.31 ENCOUNTER FOR SCREENING MAMMOGRAM FOR MALIGNANT NEOPLASM OF BREAST: Primary | ICD-10-CM

## 2024-03-18 DIAGNOSIS — J45.20 MILD INTERMITTENT ASTHMA WITHOUT COMPLICATION: ICD-10-CM

## 2024-03-18 DIAGNOSIS — R91.8 HILAR MASS: ICD-10-CM

## 2024-03-18 DIAGNOSIS — G47.09 OTHER INSOMNIA: Chronic | ICD-10-CM

## 2024-03-18 DIAGNOSIS — E78.5 HYPERLIPIDEMIA ASSOCIATED WITH TYPE 2 DIABETES MELLITUS: Chronic | ICD-10-CM

## 2024-03-18 DIAGNOSIS — E11.9 CONTROLLED TYPE 2 DIABETES MELLITUS WITHOUT COMPLICATION, WITHOUT LONG-TERM CURRENT USE OF INSULIN: ICD-10-CM

## 2024-03-18 DIAGNOSIS — J84.10 CALCIFIED GRANULOMA OF LUNG: ICD-10-CM

## 2024-03-18 DIAGNOSIS — E11.69 HYPERLIPIDEMIA ASSOCIATED WITH TYPE 2 DIABETES MELLITUS: Chronic | ICD-10-CM

## 2024-03-18 PROBLEM — E66.01 SEVERE OBESITY (BMI 35.0-39.9) WITH COMORBIDITY: Status: RESOLVED | Noted: 2022-12-15 | Resolved: 2024-03-18

## 2024-03-18 PROCEDURE — 3074F SYST BP LT 130 MM HG: CPT | Mod: CPTII,S$GLB,, | Performed by: INTERNAL MEDICINE

## 2024-03-18 PROCEDURE — 1159F MED LIST DOCD IN RCRD: CPT | Mod: CPTII,S$GLB,, | Performed by: INTERNAL MEDICINE

## 2024-03-18 PROCEDURE — 4010F ACE/ARB THERAPY RXD/TAKEN: CPT | Mod: CPTII,S$GLB,, | Performed by: INTERNAL MEDICINE

## 2024-03-18 PROCEDURE — 1101F PT FALLS ASSESS-DOCD LE1/YR: CPT | Mod: CPTII,S$GLB,, | Performed by: INTERNAL MEDICINE

## 2024-03-18 PROCEDURE — 3079F DIAST BP 80-89 MM HG: CPT | Mod: CPTII,S$GLB,, | Performed by: INTERNAL MEDICINE

## 2024-03-18 PROCEDURE — 99205 OFFICE O/P NEW HI 60 MIN: CPT | Mod: S$GLB,,, | Performed by: INTERNAL MEDICINE

## 2024-03-18 PROCEDURE — 1160F RVW MEDS BY RX/DR IN RCRD: CPT | Mod: CPTII,S$GLB,, | Performed by: INTERNAL MEDICINE

## 2024-03-18 PROCEDURE — 99999 PR PBB SHADOW E&M-EST. PATIENT-LVL V: CPT | Mod: PBBFAC,,, | Performed by: INTERNAL MEDICINE

## 2024-03-18 PROCEDURE — 3288F FALL RISK ASSESSMENT DOCD: CPT | Mod: CPTII,S$GLB,, | Performed by: INTERNAL MEDICINE

## 2024-03-18 PROCEDURE — 3008F BODY MASS INDEX DOCD: CPT | Mod: CPTII,S$GLB,, | Performed by: INTERNAL MEDICINE

## 2024-03-18 NOTE — ASSESSMENT & PLAN NOTE
2.3 cm subpleural lingular mass.    FDG avid   Pre test is high for neoplasm   Low-grade uptake in the left hilar lymph nodes  Will need a percutaneous CT-guided biopsy  EBUS will be   planned subsequently     Inform patient she likely has early stage disease that would be amenable to a surgical resection  PFT ABG and 6 minute walk  Will discussing multidisciplinary tumor board

## 2024-03-18 NOTE — PROGRESS NOTES
"                                           Pulmonary Outpatient  Visit     Subjective:       Patient ID: Cassandra Campos is a 73 y.o. female.    Social History     Tobacco Use   Smoking Status Former    Current packs/day: 0.00    Average packs/day: 0.5 packs/day for 42.0 years (21.0 ttl pk-yrs)    Types: Cigarettes    Start date: 1970    Quit date: 2012    Years since quittin.2    Passive exposure: Past   Smokeless Tobacco Never            Chief Complaint: Cough, Insomnia, and Asthma          Cassandra Campos is 73 y.o.  Asked to see by Dr Ball  Abn imaging CT abdomen   No cough no wheezing no shortness of breath   Last seen in this department in to any to any   History of asthma on inhaler   Former smoker quit 20 years ago   Retired teacher   No past no history of cancer   No family history of cancer   Sinus congestion - hole in nose from snorting cocaine  Smoked 1ppd for many years - not smoking now, 15 pack years  Denies weight loss hemoptysis no TB exposure       Imaging reviewed with patient        Solitary Pulmonary Nodule (SPN) Malignancy Risk Score ([facility] Model) from [employer]  on 3/18/2024  ** All calculations should be rechecked by clinician prior to use **    RESULT SUMMARY:  95.7 %  Probability of malignancy      One study suggests watchful waiting only at very low post-test probabilities (<2%), biopsy at "lower" post-test probabilities (2% to 20%), and surgery at higher post-test probabilities (>70%). See Next Steps.      INPUTS:  Age --> 73 years  Nodule diameter --> 23 mm  Current or former smoker --> 1 = Yes  Extrathoracic cancer diagnosis ?5 years prior --> 0 = No  Upper lobe location of tumor --> 1 = Yes  Nodule spiculation --> 1 = Yes  FDG-PET --> 4 = Intense uptake      Review of Systems   Constitutional: Negative.    Eyes: Negative.    Respiratory:  Negative for snoring, cough, sputum production, shortness of breath, wheezing, use of rescue inhaler and somnolence.  "   Cardiovascular: Negative.    Genitourinary: Negative.    Endocrine: endocrine negative    Musculoskeletal: Negative.    Skin: Negative.    Gastrointestinal: Negative.    Psychiatric/Behavioral: Negative.     All other systems reviewed and are negative.      Outpatient Encounter Medications as of 3/18/2024   Medication Sig Dispense Refill    albuterol (PROVENTIL/VENTOLIN HFA) 90 mcg/actuation inhaler INHALE 2 PUFFS INTO THE LUNGS EVERY 4 HOURS AS NEEDED FOR WHEEZING OR SHORTNESS OF BREATH. 18 g 1    amitriptyline (ELAVIL) 100 MG tablet Take 1 tablet (100 mg total) by mouth every evening. 90 tablet 1    blood glucose control, high (TRUE METRIX LEVEL 3) Soln 1 each by Other route once daily. 1 each 5    blood sugar diagnostic (ACCU-CHEK GUIDE TEST STRIPS) Strp USE TO TEST TWICE A  strip 3    blood-glucose meter Misc 1 each by Misc.(Non-Drug; Combo Route) route 2 (two) times a day. 1 each 1    cholecalciferol, vitamin D3, (VITAMIN D3) 25 mcg (1,000 unit) capsule Take 1,000 Units by mouth once daily.      eszopiclone (LUNESTA) 3 mg Tab Take 1 tablet (3 mg total) by mouth every evening. 30 tablet 2    HYDROcodone-acetaminophen (NORCO) 5-325 mg per tablet Take 1 tablet by mouth every 6 (six) hours as needed for Pain. 15 tablet 0    lancing device with lancets (ACCU-CHEK SOFT DEV LANCETS) Kit 1 each by Misc.(Non-Drug; Combo Route) route 2 (two) times a day. 1 each 4    LORazepam (ATIVAN) 1 MG tablet Take 1 tablet (1 mg total) by mouth 2 (two) times daily. 60 tablet 2    losartan (COZAAR) 25 MG tablet TAKE 1 TABLET BY MOUTH ONCE  DAILY 90 tablet 2    metoprolol succinate (TOPROL-XL) 50 MG 24 hr tablet TAKE 1 TABLET BY MOUTH ONCE  DAILY 90 tablet 2    omeprazole (PRILOSEC) 20 MG capsule TAKE 1 CAPSULE BY MOUTH ONCE  DAILY 90 capsule 2    oxyCODONE-acetaminophen (PERCOCET)  mg per tablet Take 1 tablet by mouth every 6 (six) hours as needed for Pain. 28 tablet 0    RSVPreF3 antigen-AS01E, PF, (AREXVY, PF,) 120  mcg/0.5 mL SusR vaccine Inject into the muscle. 0.5 mL 0    semaglutide (OZEMPIC) 2 mg/dose (8 mg/3 mL) PnIj Inject 2 mg into the skin every 7 days. 3 mL 2    TRUEPLUS LANCETS 33 gauge Misc TEST BLOOD SUGAR ONE TIME DAILY 100 each 3     Facility-Administered Encounter Medications as of 3/18/2024   Medication Dose Route Frequency Provider Last Rate Last Admin    chlorhexidine 0.12 % solution 10 mL  10 mL Mouth/Throat On Call Procedure Falguni Lindquist PA-C        lactated ringers infusion   Intravenous On Call Procedure Dayanna Jones PA   New Bag at 20 0912    nozaseptin (NOZIN) nasal    Each Nostril On Call Procedure Dayanna Jones PA   Given at 20 0825       The following portions of the patient's history were reviewed and updated as appropriate: She  has a past medical history of Arthritis, Bilateral bunions, Borderline glaucoma, De Quervain's disease (radial styloid tenosynovitis), Gastritis, Hydradenitis, Hyperlipidemia, Hypertension, Insomnia, Migraines (2000), Nasal septum perforation, Obesity, Pneumonia, Restrictive airway disease, Sleep apnea, SVT (supraventricular tachycardia) (2013), Trigger finger, and Type 2 diabetes mellitus ().  She does not have any pertinent problems on file.  She  has a past surgical history that includes Cholecystectomy (2014); Carpal tunnel release; Axillary hidradenitis excision (Bilateral); Trigger finger release (Right, 2015); Cyst Removal (2015); TONSILLECTOMY, ADENOIDECTOMY ();  section (); gastric sleeve (2017); Knee surgery (Right); Cataract extraction (Bilateral); Breast biopsy (Bilateral); Olecranon bursectomy (Right, 2018); Bone exostosis excision (Right, 2018); Surgical removal of bunion with osteotomy of metatarsal bone (Left, 05/10/2019); Surgical removal of bunion with osteotomy of metatarsal bone (Right, 2019); De Quervain's release (Left, 2020); Colonoscopy w/  polypectomy (10/02/2020); Colonoscopy (N/A, 10/02/2020); De Quervain's release (Right, 11/20/2020); Eye surgery; Breast surgery (07/1998); Carpal tunnel release (Right, 2/9/2024); and Carpal tunnel release (Left, 3/8/2024).  Her family history includes Diabetes in her cousin and maternal aunt; Hypertension in her maternal grandmother; Prostate cancer in her brother.  She  reports that she quit smoking about 12 years ago. Her smoking use included cigarettes. She started smoking about 54 years ago. She has a 21.0 pack-year smoking history. She has been exposed to tobacco smoke. She has never used smokeless tobacco. She reports that she does not currently use alcohol after a past usage of about 1.0 standard drink of alcohol per week. She reports that she does not use drugs.  She has a current medication list which includes the following prescription(s): albuterol, amitriptyline, true metrix level 3, blood sugar diagnostic, blood-glucose meter, cholecalciferol (vitamin d3), eszopiclone, hydrocodone-acetaminophen, lancing device with lancets, lorazepam, losartan, metoprolol succinate, omeprazole, oxycodone-acetaminophen, arexvy (pf), ozempic, and trueplus lancets, and the following Facility-Administered Medications: chlorhexidine, lactated ringers, and nozaseptin.  Current Outpatient Medications on File Prior to Visit   Medication Sig Dispense Refill    albuterol (PROVENTIL/VENTOLIN HFA) 90 mcg/actuation inhaler INHALE 2 PUFFS INTO THE LUNGS EVERY 4 HOURS AS NEEDED FOR WHEEZING OR SHORTNESS OF BREATH. 18 g 1    amitriptyline (ELAVIL) 100 MG tablet Take 1 tablet (100 mg total) by mouth every evening. 90 tablet 1    blood glucose control, high (TRUE METRIX LEVEL 3) Soln 1 each by Other route once daily. 1 each 5    blood sugar diagnostic (ACCU-CHEK GUIDE TEST STRIPS) Strp USE TO TEST TWICE A  strip 3    blood-glucose meter Misc 1 each by Misc.(Non-Drug; Combo Route) route 2 (two) times a day. 1 each 1     cholecalciferol, vitamin D3, (VITAMIN D3) 25 mcg (1,000 unit) capsule Take 1,000 Units by mouth once daily.      eszopiclone (LUNESTA) 3 mg Tab Take 1 tablet (3 mg total) by mouth every evening. 30 tablet 2    HYDROcodone-acetaminophen (NORCO) 5-325 mg per tablet Take 1 tablet by mouth every 6 (six) hours as needed for Pain. 15 tablet 0    lancing device with lancets (ACCU-CHEK SOFT DEV LANCETS) Kit 1 each by Misc.(Non-Drug; Combo Route) route 2 (two) times a day. 1 each 4    LORazepam (ATIVAN) 1 MG tablet Take 1 tablet (1 mg total) by mouth 2 (two) times daily. 60 tablet 2    losartan (COZAAR) 25 MG tablet TAKE 1 TABLET BY MOUTH ONCE  DAILY 90 tablet 2    metoprolol succinate (TOPROL-XL) 50 MG 24 hr tablet TAKE 1 TABLET BY MOUTH ONCE  DAILY 90 tablet 2    omeprazole (PRILOSEC) 20 MG capsule TAKE 1 CAPSULE BY MOUTH ONCE  DAILY 90 capsule 2    oxyCODONE-acetaminophen (PERCOCET)  mg per tablet Take 1 tablet by mouth every 6 (six) hours as needed for Pain. 28 tablet 0    RSVPreF3 antigen-AS01E, PF, (AREXVY, PF,) 120 mcg/0.5 mL SusR vaccine Inject into the muscle. 0.5 mL 0    semaglutide (OZEMPIC) 2 mg/dose (8 mg/3 mL) PnIj Inject 2 mg into the skin every 7 days. 3 mL 2    TRUEPLUS LANCETS 33 gauge Misc TEST BLOOD SUGAR ONE TIME DAILY 100 each 3     Current Facility-Administered Medications on File Prior to Visit   Medication Dose Route Frequency Provider Last Rate Last Admin    chlorhexidine 0.12 % solution 10 mL  10 mL Mouth/Throat On Call Procedure Falguni Lindquist PA-C        lactated ringers infusion   Intravenous On Call Procedure Dayanna Jones PA   New Bag at 11/20/20 0912    nozaseptin (NOZIN) nasal    Each Nostril On Call Procedure Dayanna Jones PA   Given at 11/20/20 0825     She has No Known Allergies..      BP Readings from Last 3 Encounters:   03/18/24 122/80   03/08/24 133/81   02/26/24 115/80     Snoring / Sleep:            MMRC Dyspnea Scale (4 is worst)     [x] MMRC 0: Dyspneic  "on strenuous excercise (0 points)    [] MMRC 1: Dyspneic on walking a slight hill (0 points)    [] MMRC 2: Dyspneic on walking level ground; must stop occasionally due to breathlessness (1 point)    [] MMRC 3: Must stop for breathlessness after walking 100 yards or after a few minutes (2 points)    [] MMRC 4: Cannot leave house; breathless on dressing/undressing (3 points)       Asthma Control Test  In the past 4  weeks, how much of the time did your asthma keep you from getting as much done at work, school or at home?: None of the time  During the past 4 weeks, how often have you had shortness of breath?: Not at all  During the past 4 weeks, how often did your asthma symptoms (wheezing, couging, shortness of breath, chest tightness or pain) wake you up at night or earlier than usual in the morning?: Not at all  During the past 4 weeks, how often have you used your rescue inhaler or nebulizer medication (such as albuterol)?: Not at all  How would you rate your asthma control during the past 4 weeks?: Completely controlled  If your score is 19 or less, your asthma may not be under control: 25                  No data to display                          Objective:     Vital Signs (Most Recent)  Vital Signs  Pulse: (!) 118  Resp: 17  SpO2: 97 %  BP: 122/80  Height and Weight  Height: 5' 3" (160 cm)  Weight: 77.9 kg (171 lb 11.8 oz)  BSA (Calculated - sq m): 1.86 sq meters  BMI (Calculated): 30.4  Weight in (lb) to have BMI = 25: 140.8]  Wt Readings from Last 2 Encounters:   03/18/24 77.9 kg (171 lb 11.8 oz)   03/12/24 80 kg (176 lb 5.9 oz)       Physical Exam  Vitals and nursing note reviewed.   Constitutional:       Appearance: She is normal weight.   HENT:      Head: Normocephalic and atraumatic.      Nose: Nose normal.   Eyes:      Pupils: Pupils are equal, round, and reactive to light.   Cardiovascular:      Rate and Rhythm: Normal rate and regular rhythm.      Pulses: Normal pulses.      Heart sounds: Normal heart " "sounds.   Pulmonary:      Effort: Pulmonary effort is normal.      Breath sounds: Normal breath sounds.   Abdominal:      General: Bowel sounds are normal.      Palpations: Abdomen is soft.   Musculoskeletal:      Cervical back: Normal range of motion.   Skin:     General: Skin is warm.   Neurological:      General: No focal deficit present.      Mental Status: She is alert and oriented to person, place, and time.   Psychiatric:         Mood and Affect: Mood normal.          Laboratory  Lab Results   Component Value Date    WBC 7.12 02/01/2024    RBC 5.50 (H) 02/01/2024    HGB 13.9 02/01/2024    HCT 41.8 02/01/2024    MCV 76 (L) 02/01/2024    MCH 25.3 (L) 02/01/2024    MCHC 33.3 02/01/2024    RDW 15.0 (H) 02/01/2024     02/01/2024    MPV 9.8 02/01/2024    GRAN 3.7 02/01/2024    GRAN 52.6 02/01/2024    LYMPH 2.6 02/01/2024    LYMPH 35.8 02/01/2024    MONO 0.7 02/01/2024    MONO 9.7 02/01/2024    EOS 0.1 02/01/2024    BASO 0.03 02/01/2024    EOSINOPHIL 1.4 02/01/2024    BASOPHIL 0.4 02/01/2024       BMP  Lab Results   Component Value Date     02/01/2024    K 4.1 02/01/2024     02/01/2024    CO2 26 02/01/2024    BUN 7 (L) 02/01/2024    CREATININE 0.7 02/01/2024    CALCIUM 9.7 02/01/2024    ANIONGAP 7 (L) 02/01/2024    ESTGFRAFRICA >60 06/21/2022    EGFRNONAA >60 06/21/2022    AST 26 12/27/2023    ALT 58 (H) 12/27/2023    PROT 8.3 12/27/2023          No results found for: "IGE"     No results found for: "ASPERGILLUS"  No results found for: "AFUMIGATUSCL"     No results found for: "ACE"     Diagnostic Results:  I have personally reviewed today the following studies:    NM PET CT FDG Skull Base to Mid Thigh  Narrative: EXAMINATION:  NM PET CT FDG SKULL BASE TO MID THIGH    CLINICAL HISTORY:  Lung nodule, > 8mm; Other nonspecific abnormal finding of lung field    TECHNIQUE:  10.9 mCi of F18-FDG was administered intravenously in the left antecubital fossa.  After an approximately 60 min distribution time, " PET/CT images were acquired from the skull base to the mid thigh. Transmission images were acquired to correct for attenuation using a whole body low-dose CT scan without contrast with the arms positioned above the head. Glycemia at the time of injection was 97 mg/dL.    COMPARISON:  Chest CT, 02/16/2024.  PET CT, 11/06/2017.    FINDINGS:  Quality of the study: Adequate.    SUV max of the liver parenchyma is 2.7    Neck: No abnormal FDG avidity.  No lymphadenopathy.    Chest: Lingular subpleural pulmonary nodule measuring 2.3 x 2.2 cm with SUV max 8.4.    Subpleural posterior left lower lobe pulmonary nodule measuring 1.4 x 1.1 cm with SUV max 1.4.    Left hilar lymph node measuring 1.8 x 1.3 cm with SUV max 4.7.    Left axillary lymph node measuring 1.9 x 1.5 cm with eccentric cortical thickening with SUV max 3.6.    Abdomen/pelvis: No abnormal FDG avidity.  No ascites or lymphadenopathy.    Skeletal structures: No abnormal FDG avidity.  No focal lytic or sclerotic lesion.    Physiologic uptake of the tracer is present within the brain, salivary glands, myocardium, GI and  tracts.    Incidental CT findings: N/A  Impression: 1. Markedly FDG avid lingular subpleural pulmonary nodule concerning for primary lung cancer.  2. Mildly FDG avid left hilar lymph node measuring 1.8 x 1.3 cm, nonspecific, but could be a metastatic left hilar lymph node.  3. Mildly FDG avid left axillary lymph node as described above, nonspecific.  4. No abnormal FDG avidity in the neck, abdomen, pelvis, or osseous structures.  All CT scans at this facility are performed  using dose modulation techniques as appropriate to performed exam including the following:  automated exposure control; adjustment of mA and/or kV according to the patients size (this includes techniques or standardized protocols for targeted exams where dose is matched to indication/reason for exam: i.e. extremities or head);  iterative reconstruction  technique.    Electronically signed by: Juan Pablo Vega MD  Date:    03/12/2024  Time:    18:23                       Assessment/Plan:     Problem List Items Addressed This Visit       Hyperlipidemia associated with type 2 diabetes mellitus (Chronic)    Insomnia (Chronic)     On LUNESTA         Calcified granuloma of lung    BMI 31.0-31.9,adult    Primary hypertension     On Cozaar and Metoprolol         Controlled type 2 diabetes mellitus without complication, without long-term current use of insulin     On OZEMPIC         Mild intermittent asthma     PFT's  P.r.n. albuterol         Solitary pulmonary nodule     2.3 cm subpleural lingular mass.    FDG avid   Pre test is high for neoplasm   Low-grade uptake in the left hilar lymph nodes  Will need a percutaneous CT-guided biopsy  EBUS will be   planned subsequently     Inform patient she likely has early stage disease that would be amenable to a surgical resection  PFT ABG and 6 minute walk  Will discussing multidisciplinary tumor board           Hilar mass     FDG avid  Will plan for EBUS after CT biopsy          Other Visit Diagnoses       Pulmonary nodules    -  Primary    Relevant Orders    Stress test, pulmonary    Complete PFT without bronchodilator - Clinic    PFT - related Arterial Blood Gas    CT Biopsy Lung w/ guidance                  Follow up in about 2 weeks (around 4/1/2024), or CT biopsy, PFT, ABG, 6MWD.    This note was prepared using voice recognition system and is likely to have sound alike errors that may have been overlooked even after proof reading.  Please call me with any questions    Discussed diagnosis, its evaluation, treatment and usual course. All questions answered.    Thank you for the courtesy of participating in the care of this patient    Adrian Robin MD      Personal Diagnostic Review  []  CXR    []  ECHO    []  ONSAT    []  6MWD    []  LABS    []  CHEST CT    []  PET CT    []  Biopsy results

## 2024-03-18 NOTE — ANESTHESIA POSTPROCEDURE EVALUATION
Anesthesia Post Evaluation    Patient: Cassandra Campos    Procedure(s) Performed: Procedure(s) (LRB):  RELEASE, CARPAL TUNNEL (Left)    Final Anesthesia Type: MAC      Patient location during evaluation: PACU  Patient participation: Yes- Able to Participate  Level of consciousness: awake and alert and oriented  Post-procedure vital signs: reviewed and stable  Pain management: adequate  Airway patency: patent  DANIEL mitigation strategies: Multimodal analgesia  PONV status at discharge: No PONV  Anesthetic complications: no      Cardiovascular status: blood pressure returned to baseline and hemodynamically stable  Respiratory status: unassisted  Hydration status: euvolemic  Follow-up not needed.              Vitals Value Taken Time   /81 03/08/24 0745   Temp 36.6 °C (97.8 °F) 03/08/24 0745   Pulse 89 03/08/24 0745   Resp 19 03/08/24 0745   SpO2 97 % 03/08/24 0745         Event Time   Out of Recovery 07:49:56         Pain/Francheska Score: No data recorded

## 2024-03-21 RX ORDER — LOSARTAN POTASSIUM 25 MG/1
TABLET ORAL
Qty: 90 TABLET | Refills: 3 | Status: SHIPPED | OUTPATIENT
Start: 2024-03-21

## 2024-03-21 NOTE — TELEPHONE ENCOUNTER
Refill Decision Note   Cassandra Campos  is requesting a refill authorization.  Brief Assessment and Rationale for Refill:  Approve     Medication Therapy Plan:         Comments:     Note composed:9:14 AM 03/21/2024             Appointments     Last Visit   2/13/2024 Emil Zhang MD   Next Visit   5/13/2024 Emil Zhang MD

## 2024-03-21 NOTE — TELEPHONE ENCOUNTER
No care due was identified.  [facility] Embedded Care Due Messages. Reference number: [id number].   3/20/2024 9:34:10 PM CDT

## 2024-03-22 ENCOUNTER — OFFICE VISIT (OUTPATIENT)
Dept: ORTHOPEDICS | Facility: CLINIC | Age: 74
End: 2024-03-22
Payer: MEDICARE

## 2024-03-22 ENCOUNTER — HOSPITAL ENCOUNTER (OUTPATIENT)
Dept: RADIOLOGY | Facility: HOSPITAL | Age: 74
Discharge: HOME | End: 2024-03-22
Attending: PHYSICIAN ASSISTANT
Payer: MEDICARE

## 2024-03-22 VITALS
DIASTOLIC BLOOD PRESSURE: 89 MMHG | WEIGHT: 171.75 LBS | SYSTOLIC BLOOD PRESSURE: 123 MMHG | BODY MASS INDEX: 30.43 KG/M2 | HEART RATE: 115 BPM | BODY MASS INDEX: 30.43 KG/M2 | HEIGHT: 63 IN | HEIGHT: 63 IN | WEIGHT: 171.75 LBS

## 2024-03-22 DIAGNOSIS — Z98.890 S/P CARPAL TUNNEL RELEASE: Primary | ICD-10-CM

## 2024-03-22 DIAGNOSIS — M25.562 PAIN IN BOTH KNEES, UNSPECIFIED CHRONICITY: ICD-10-CM

## 2024-03-22 DIAGNOSIS — M54.16 LUMBAR RADICULOPATHY: ICD-10-CM

## 2024-03-22 DIAGNOSIS — M17.0 PRIMARY OSTEOARTHRITIS OF BOTH KNEES: ICD-10-CM

## 2024-03-22 DIAGNOSIS — M25.552 BILATERAL HIP PAIN: ICD-10-CM

## 2024-03-22 DIAGNOSIS — M25.561 PAIN IN BOTH KNEES, UNSPECIFIED CHRONICITY: ICD-10-CM

## 2024-03-22 DIAGNOSIS — M25.552 BILATERAL HIP PAIN: Primary | ICD-10-CM

## 2024-03-22 DIAGNOSIS — M25.551 BILATERAL HIP PAIN: Primary | ICD-10-CM

## 2024-03-22 DIAGNOSIS — M77.11 LATERAL EPICONDYLITIS, RIGHT ELBOW: ICD-10-CM

## 2024-03-22 DIAGNOSIS — M25.551 BILATERAL HIP PAIN: ICD-10-CM

## 2024-03-22 PROCEDURE — 1101F PT FALLS ASSESS-DOCD LE1/YR: CPT | Mod: CPTII,S$GLB,, | Performed by: PHYSICIAN ASSISTANT

## 2024-03-22 PROCEDURE — 73564 X-RAY EXAM KNEE 4 OR MORE: CPT | Mod: TC,50

## 2024-03-22 PROCEDURE — 3288F FALL RISK ASSESSMENT DOCD: CPT | Mod: CPTII,S$GLB,,

## 2024-03-22 PROCEDURE — 1125F AMNT PAIN NOTED PAIN PRSNT: CPT | Mod: CPTII,S$GLB,, | Performed by: PHYSICIAN ASSISTANT

## 2024-03-22 PROCEDURE — 99999 PR PBB SHADOW E&M-EST. PATIENT-LVL IV: CPT | Mod: PBBFAC,,, | Performed by: PHYSICIAN ASSISTANT

## 2024-03-22 PROCEDURE — 73521 X-RAY EXAM HIPS BI 2 VIEWS: CPT | Mod: TC

## 2024-03-22 PROCEDURE — 72100 X-RAY EXAM L-S SPINE 2/3 VWS: CPT | Mod: TC

## 2024-03-22 PROCEDURE — 99204 OFFICE O/P NEW MOD 45 MIN: CPT | Mod: 25,S$GLB,, | Performed by: PHYSICIAN ASSISTANT

## 2024-03-22 PROCEDURE — 72100 X-RAY EXAM L-S SPINE 2/3 VWS: CPT | Mod: 26,,, | Performed by: RADIOLOGY

## 2024-03-22 PROCEDURE — 3288F FALL RISK ASSESSMENT DOCD: CPT | Mod: CPTII,S$GLB,, | Performed by: PHYSICIAN ASSISTANT

## 2024-03-22 PROCEDURE — 73564 X-RAY EXAM KNEE 4 OR MORE: CPT | Mod: 26,50,, | Performed by: RADIOLOGY

## 2024-03-22 PROCEDURE — 1126F AMNT PAIN NOTED NONE PRSNT: CPT | Mod: CPTII,S$GLB,,

## 2024-03-22 PROCEDURE — 3079F DIAST BP 80-89 MM HG: CPT | Mod: CPTII,S$GLB,, | Performed by: PHYSICIAN ASSISTANT

## 2024-03-22 PROCEDURE — 20610 DRAIN/INJ JOINT/BURSA W/O US: CPT | Mod: LT,S$GLB,, | Performed by: PHYSICIAN ASSISTANT

## 2024-03-22 PROCEDURE — 4010F ACE/ARB THERAPY RXD/TAKEN: CPT | Mod: CPTII,S$GLB,, | Performed by: PHYSICIAN ASSISTANT

## 2024-03-22 PROCEDURE — 1101F PT FALLS ASSESS-DOCD LE1/YR: CPT | Mod: CPTII,S$GLB,,

## 2024-03-22 PROCEDURE — 73521 X-RAY EXAM HIPS BI 2 VIEWS: CPT | Mod: 26,,, | Performed by: RADIOLOGY

## 2024-03-22 PROCEDURE — 20551 NJX 1 TENDON ORIGIN/INSJ: CPT | Mod: RT,S$GLB,,

## 2024-03-22 PROCEDURE — 3008F BODY MASS INDEX DOCD: CPT | Mod: CPTII,S$GLB,, | Performed by: PHYSICIAN ASSISTANT

## 2024-03-22 PROCEDURE — 3074F SYST BP LT 130 MM HG: CPT | Mod: CPTII,S$GLB,, | Performed by: PHYSICIAN ASSISTANT

## 2024-03-22 PROCEDURE — 99999 PR PBB SHADOW E&M-EST. PATIENT-LVL III: CPT | Mod: PBBFAC,,,

## 2024-03-22 PROCEDURE — 4010F ACE/ARB THERAPY RXD/TAKEN: CPT | Mod: CPTII,S$GLB,,

## 2024-03-22 PROCEDURE — 99024 POSTOP FOLLOW-UP VISIT: CPT | Mod: S$GLB,,,

## 2024-03-22 RX ORDER — METHYLPREDNISOLONE ACETATE 80 MG/ML
80 INJECTION, SUSPENSION INTRA-ARTICULAR; INTRALESIONAL; INTRAMUSCULAR; SOFT TISSUE
Status: DISCONTINUED | OUTPATIENT
Start: 2024-03-22 | End: 2024-03-22 | Stop reason: HOSPADM

## 2024-03-22 RX ORDER — TRIAMCINOLONE ACETONIDE 40 MG/ML
40 INJECTION, SUSPENSION INTRA-ARTICULAR; INTRAMUSCULAR
Status: DISCONTINUED | OUTPATIENT
Start: 2024-03-22 | End: 2024-03-22 | Stop reason: HOSPADM

## 2024-03-22 RX ORDER — METHOCARBAMOL 500 MG/1
500 TABLET, FILM COATED ORAL 3 TIMES DAILY
Qty: 30 TABLET | Refills: 0 | Status: SHIPPED | OUTPATIENT
Start: 2024-03-22 | End: 2024-04-04

## 2024-03-22 RX ORDER — LIDOCAINE HYDROCHLORIDE 10 MG/ML
5 INJECTION INFILTRATION; PERINEURAL
Status: DISCONTINUED | OUTPATIENT
Start: 2024-03-22 | End: 2024-03-22 | Stop reason: HOSPADM

## 2024-03-22 RX ORDER — GABAPENTIN 100 MG/1
100 CAPSULE ORAL NIGHTLY
Qty: 30 CAPSULE | Refills: 0 | Status: SHIPPED | OUTPATIENT
Start: 2024-03-22 | End: 2024-04-25

## 2024-03-22 RX ADMIN — METHYLPREDNISOLONE ACETATE 80 MG: 80 INJECTION, SUSPENSION INTRA-ARTICULAR; INTRALESIONAL; INTRAMUSCULAR; SOFT TISSUE at 10:03

## 2024-03-22 RX ADMIN — TRIAMCINOLONE ACETONIDE 40 MG: 40 INJECTION, SUSPENSION INTRA-ARTICULAR; INTRAMUSCULAR at 11:03

## 2024-03-22 RX ADMIN — LIDOCAINE HYDROCHLORIDE 5 ML: 10 INJECTION INFILTRATION; PERINEURAL at 10:03

## 2024-03-22 NOTE — PROGRESS NOTES
SUBJECTIVE:      Patient ID: Cassandra Campos is a 73 y.o. female.    HPI: Ms. Campos is here today for post-operative visit #2.  She is 14 days status post L carpal tunnel release by Dr. Oswald on 3/8/24. She reports that she is doing well. Pain is 0/10.  She is taking no medication for pain.  She has been compliant with postop instructions and keeping the extremity dry. She denies fever, chills, and sweats since the time of the surgery.   She also reports pain at the right lateral epicondyle today. States that she cannot lift things without it hurting. She would like an injection today.    Interval hx 3/12/24: Ms. Campos is here today for post-operative visit #1.  She is 4 days status post left carpal tunnel release by Dr. Oswald on 3/8/24. She reports that she is doing well.  Pain is 3/10.  She is taking the Percocet as directed for pain.  She has been compliant with postop instructions and keeping the extremity dry. She denies fever, chills, and sweats since the time of the surgery.   Of note, she also admits to left knee and left groin pain for about 2 weeks.  Upon chart review, the patient has a large umbilical hernia on the left side and has been seen by General surgery for this.  She wishes to be evaluated for her knee pain.    Past Medical History:   Diagnosis Date    Arthritis     hands    Bilateral bunions     Borderline glaucoma     De Quervain's disease (radial styloid tenosynovitis)     Gastritis     upper GI 2/2017    Hydradenitis     Hyperlipidemia     Hypertension     Insomnia     Migraines 02/01/2000    Nasal septum perforation     Obesity     Pneumonia     Restrictive airway disease     Sleep apnea     SVT (supraventricular tachycardia) 09/2013    Trigger finger     Type 2 diabetes mellitus 2012     am 02/01/2024     Past Surgical History:   Procedure Laterality Date    AXILLARY HIDRADENITIS EXCISION Bilateral     BONE EXOSTOSIS EXCISION Right 07/25/2018    Procedure: EXCISION, EXOSTOSIS;   Surgeon: Jayro Pedraza Sr., MD;  Location: [facility];  Service: Orthopedics;  Laterality: Right;    BREAST BIOPSY Bilateral     both benign    BREAST SURGERY  1998    CARPAL TUNNEL RELEASE      bilateral    CARPAL TUNNEL RELEASE Right 2024    Procedure: RELEASE, CARPAL TUNNEL;  Surgeon: Jaime Oswald MD;  Location: [facility] OR;  Service: Orthopedics;  Laterality: Right;    CARPAL TUNNEL RELEASE Left 3/8/2024    Procedure: RELEASE, CARPAL TUNNEL;  Surgeon: Jaime Oswald MD;  Location: [facility] OR;  Service: Orthopedics;  Laterality: Left;    CATARACT EXTRACTION Bilateral     OU     SECTION  1979    CHOLECYSTECTOMY  2014    COLONOSCOPY N/A 10/02/2020    Procedure: COLONOSCOPY;  Surgeon: Tushar Edwards MD;  Location: [facility];  Service: Endoscopy;  Laterality: N/A;    COLONOSCOPY W/ POLYPECTOMY  10/02/2020    Polyps x3, repeat 5 years; Tushar Edwards MD     CYST REMOVAL  2015    sebaceous cyst removed from face    DE QUERVAIN'S RELEASE Left 2020    Procedure: RELEASE, HAND, FOR DEQUERVAIN'S TENOSYNOVITIS;  Surgeon: Jaime Oswald MD;  Location: [facility] OR;  Service: Orthopedics;  Laterality: Left;    DE QUERVAIN'S RELEASE Right 2020    Procedure: RELEASE, HAND, FOR DEQUERVAIN'S TENOSYNOVITIS;  Surgeon: Jaime Oswald MD;  Location: [facility];  Service: Orthopedics;  Laterality: Right;    EYE SURGERY      gastric sleeve  2017    Dr. Watson    KNEE SURGERY Right     OLECRANON BURSECTOMY Right 2018    Procedure: BURSECTOMY, OLECRANON;  Surgeon: Jayro Pedraza Sr., MD;  Location: [facility];  Service: Orthopedics;  Laterality: Right;    SURGICAL REMOVAL OF BUNION WITH OSTEOTOMY OF METATARSAL BONE Left 05/10/2019    Procedure: BUNIONECTOMY, WITH METATARSAL OSTEOTOMY;  Surgeon: Srinivasan Villanueva DPM;  Location: [facility] OR;  Service: Podiatry;  Laterality: Left;    SURGICAL REMOVAL OF BUNION WITH OSTEOTOMY OF METATARSAL BONE Right 2019    Procedure:  BUNIONECTOMY, WITH METATARSAL OSTEOTOMY;  Surgeon: Srinivasan Villanueva DPM;  Location: [facility];  Service: Podiatry;  Laterality: Right;    TONSILLECTOMY, ADENOIDECTOMY  1980s    TRIGGER FINGER RELEASE Right 2015    Dr. Pedraza     Family History   Problem Relation Age of Onset    Prostate cancer Brother     Diabetes Maternal Aunt     Diabetes Cousin     Hypertension Maternal Grandmother      Social History     Socioeconomic History    Marital status:     Number of children: 1   Occupational History    Occupation:  aid   Tobacco Use    Smoking status: Former     Current packs/day: 0.00     Average packs/day: 0.5 packs/day for 42.0 years (21.0 ttl pk-yrs)     Types: Cigarettes     Start date: 1970     Quit date: 2012     Years since quittin.2     Passive exposure: Past    Smokeless tobacco: Never   Substance and Sexual Activity    Alcohol use: Not Currently     Alcohol/week: 1.0 standard drink of alcohol     Types: 1 Glasses of wine per week     Comment: Glass red wine once a every 2 weeks    Drug use: Never    Sexual activity: Not Currently     Partners: Female     Birth control/protection: Abstinence, None   Social History Narrative    Single, part-time teacher. Masters degree biology.      Social Determinants of Health     Financial Resource Strain: Low Risk  (2023)    Overall Financial Resource Strain (CARDIA)     Difficulty of Paying Living Expenses: Not hard at all   Food Insecurity: Food Insecurity Present (2023)    Hunger Vital Sign     Worried About Running Out of Food in the Last Year: Never true     Ran Out of Food in the Last Year: Sometimes true   Transportation Needs: No Transportation Needs (2023)    PRAPARE - Transportation     Lack of Transportation (Medical): No     Lack of Transportation (Non-Medical): No   Physical Activity: Insufficiently Active (2023)    Exercise Vital Sign     Days of Exercise per Week: 3 days     Minutes of  Exercise per Session: 20 min   Stress: No Stress Concern Present (12/19/2023)    [employer] - Occupational Stress Questionnaire     Feeling of Stress : Not at all   Social Connections: Unknown (12/19/2023)    Social Connection and Isolation Panel [NHANES]     Frequency of Communication with Friends and Family: Never     Frequency of Social Gatherings with Friends and Family: More than three times a week     Active Member of Clubs or Organizations: Patient declined     Attends Club or Organization Meetings: 1 to 4 times per year     Marital Status:    Housing Stability: Low Risk  (12/19/2023)    Housing Stability Vital Sign     Unable to Pay for Housing in the Last Year: No     Number of Places Lived in the Last Year: 1     Unstable Housing in the Last Year: No     Medication List with Changes/Refills   Current Medications    ALBUTEROL (PROVENTIL/VENTOLIN HFA) 90 MCG/ACTUATION INHALER    INHALE 2 PUFFS INTO THE LUNGS EVERY 4 HOURS AS NEEDED FOR WHEEZING OR SHORTNESS OF BREATH.    AMITRIPTYLINE (ELAVIL) 100 MG TABLET    Take 1 tablet (100 mg total) by mouth every evening.    BLOOD GLUCOSE CONTROL, HIGH (TRUE METRIX LEVEL 3) SOLN    1 each by Other route once daily.    BLOOD SUGAR DIAGNOSTIC (ACCU-CHEK GUIDE TEST STRIPS) STRP    USE TO TEST TWICE A DAY    BLOOD-GLUCOSE METER MISC    1 each by Misc.(Non-Drug; Combo Route) route 2 (two) times a day.    CHOLECALCIFEROL, VITAMIN D3, (VITAMIN D3) 25 MCG (1,000 UNIT) CAPSULE    Take 1,000 Units by mouth once daily.    ESZOPICLONE (LUNESTA) 3 MG TAB    Take 1 tablet (3 mg total) by mouth every evening.    GABAPENTIN (NEURONTIN) 100 MG CAPSULE    Take 1 capsule (100 mg total) by mouth every evening.    HYDROCODONE-ACETAMINOPHEN (NORCO) 5-325 MG PER TABLET    Take 1 tablet by mouth every 6 (six) hours as needed for Pain.    LANCING DEVICE WITH LANCETS (ACCU-CHEK SOFT DEV LANCETS) KIT    1 each by Misc.(Non-Drug; Combo Route) route 2 (two)  "times a day.    LORAZEPAM (ATIVAN) 1 MG TABLET    Take 1 tablet (1 mg total) by mouth 2 (two) times daily.    LOSARTAN (COZAAR) 25 MG TABLET    TAKE 1 TABLET BY MOUTH ONCE  DAILY    METHOCARBAMOL (ROBAXIN) 500 MG TAB    Take 1 tablet (500 mg total) by mouth 3 (three) times daily. for 10 days    METOPROLOL SUCCINATE (TOPROL-XL) 50 MG 24 HR TABLET    TAKE 1 TABLET BY MOUTH ONCE  DAILY    OMEPRAZOLE (PRILOSEC) 20 MG CAPSULE    TAKE 1 CAPSULE BY MOUTH ONCE  DAILY    OXYCODONE-ACETAMINOPHEN (PERCOCET)  MG PER TABLET    Take 1 tablet by mouth every 6 (six) hours as needed for Pain.    RSVPREF3 ANTIGEN-AS01E, PF, (AREXVY, PF,) 120 MCG/0.5 ML SUSR VACCINE    Inject into the muscle.    SEMAGLUTIDE (OZEMPIC) 2 MG/DOSE (8 MG/3 ML) PNIJ    Inject 2 mg into the skin every 7 days.    TRUEPLUS LANCETS 33 GAUGE MISC    TEST BLOOD SUGAR ONE TIME DAILY     Review of patient's allergies indicates:  No Known Allergies    OBJECTIVE:     Physical exam:    Vitals:    03/22/24 1031   Weight: 77.9 kg (171 lb 11.8 oz)   Height: 5' 3" (1.6 m)   PainSc: 0-No pain   PainLoc: Wrist     Vital signs are stable, patient is afebrile.  Patient is well dressed and well groomed, no acute distress.  Alert and oriented to person, place, and time.    Left UE:  Incision is clean, dry, and intact. Wound margins well approximated.  There is no erythema or exudate. There is no sign of any infection.   She is NVI.   Sutures in place.   2+ pulses noted.  Cap refill <2 seconds  Motor intact to hand    Right Elbow:  TTP at the lateral epicondyle. Pain with resisted wrist extension.  Skin intact, no abrasions no lesions  She is NVI  No motor or sensory deficits  No signs of infection    ASSESSMENT         Encounter Diagnoses   Name Primary?    S/P carpal tunnel release Yes    Lateral epicondylitis, right elbow             14 days status post L carpal tunnel release     PLAN:           Cassandra was seen today for pain and post-op evaluation.    Diagnoses and " all orders for this visit:    S/P carpal tunnel release    Lateral epicondylitis, right elbow  -     Tendon Origin: R elbow    Other orders  -     Cancel: Carpal Tunnel      - Lateral epicondylitis dx and treatment options discussed with patient.   - Right tennis elbow injection today. Patient must wait at least 3 months between injections.    PROCEDURE:  I have explained the risks, benefits, and alternatives of the procedure in detail.  The patient voices understanding and all questions have been answered.  The patient agrees to proceed as planned. So after a sterile prep of the skin in the normal fashion the right lateral epicondyle is injected from the anterolateral approach using a 25 gauge needle with a combination of 1cc 2% plain lidocaine and 1cc of kenalog. The patient is cautioned and immediate relief of pain is secondary to the local anesthetic and will be temporary. After the anesthetic wears off there may be a increase in pain that may last for a few hours or a few days and they should use ice to help alleviate this flare up of pain. Patient tolerated the procedure well.     - PO instruction reviewed and provided to patient. She seems to be doing well.  - The incision was cleaned with hydrogen peroxide. Sutures removed with no difficulty. Steri-Strips applied.   - Patient may use brace as needed for symptomatic relief.  Right CT brace provided.  - Patient should notify the office of any signs or symptoms of infection including fevers, erythema, purulent drainage, increasing pain.    - Follow up PRN     POST OPERATIVE VISIT INSTRUCTIONS - Visit #2    1. No soaking the incision for at least 7-10 days. You may get it wet in the shower and use regular soap.    2. To avoid a hard, painful scar, we recommend you use Mederma and/or Silicone Scar patches. You may also use Cocoa Butter, Vitamin E oil, Coconut oil, etc.    You may start this 5-7 days after stitches are removed and when wound is completely  closed.    - Mederma scar cream: scar massage over incision 1-2 times per day. You may use topical lotion or Vitamin E oil. Massage an additional few times a day to help the scar become soft and less sensitive.    - Silicone Scar Patches: cut and place over the incision, then massage over the patch to help with scarring and sensitivity. Can be reused up to 10 days if you rinse it clean, let it dry out, then reapply.    Brands: Mepiform Silicone Scar Sheets (recommended), Scar Away, [employer] brand or generic pharmacy brand ([employer], [employer], [employer])    3. Continue range of motion exercises as instructed at todays visit.    4. You may take Tylenol 500mg and/or Ibuprofen 400mg every 4-6 hours with food for pain as tolerated as long as you do not have an allergy or medical condition that prevents you from taking them.    5. Therapy recommended: none at this time    6. Immobilization: brace as needed    7. Lifting restrictions: start light at about 10lb and increase gradually as tolerated    8. Follow up: prn Randi Seneca, PA-C [facility]

## 2024-03-22 NOTE — PROCEDURES
Tendon Origin: R elbow    Date/Time: 3/22/2024 11:00 AM    Performed by: Falguni Lindquist PA-C  Authorized by: Falguni Lindquist PA-C    Consent Done?:  Yes (Verbal)  Timeout: prior to procedure the correct patient, procedure, and site was verified    Indications:  Pain  Site marked: the procedure site was marked    Timeout: prior to procedure the correct patient, procedure, and site was verified    Location:  Elbow  Site:  R elbow (lateral epicondyle)  Prep: patient was prepped and draped in usual sterile fashion    Ultrasonic Guidance for Needle Placement?: No    Needle size:  25 G  Approach:  Anterolateral  Medications:  40 mg triamcinolone acetonide 40 mg/mL  Patient tolerance:  Patient tolerated the procedure well with no immediate complications

## 2024-03-22 NOTE — PROGRESS NOTES
Patient ID: Cassandra Campos is a 73 y.o. female.    Chief Complaint: Pain of the Left Knee and Pain of the Left Hip      HPI: Cassandra Campos  is a 73 y.o. female who c/o Pain of the Left Knee and Pain of the Left Hip       Patient presents as a new patient to me today with chief complaint of left knee and left hip pain.  Patient notes pain is at worst a 10/10 at times affecting her activity of daily living and thus her quality of life.  She has not using any devices to assist with ambulation nor is she wearing any knee braces.  She states she takes Tylenol as needed to help with the pain but she has a lot going on, presently waiting to undergo a lung biopsy for further evaluation.  She states she has having a hard time walking long distances, going from a sitting to standing or standing to seated position, unlevel terrain or stairs.  She states the knee is bothering her more today would like to focus on that.  She points to the patella saying it aches and affects mobility of bending her leg.      Patient is presently denying any shortness of breath, chest pain, fever/chills, nausea/vomiting, loss of taste or smell, numbness/tingling or sensation changes, loss of bladder or bowel function.    Past Medical History:   Diagnosis Date    Arthritis     hands    Bilateral bunions     Borderline glaucoma     De Quervain's disease (radial styloid tenosynovitis)     Gastritis     upper GI 2/2017    Hydradenitis     Hyperlipidemia     Hypertension     Insomnia     Migraines 02/01/2000    Nasal septum perforation     Obesity     Pneumonia     Restrictive airway disease     Sleep apnea     SVT (supraventricular tachycardia) 09/2013    Trigger finger     Type 2 diabetes mellitus 2012     am 02/01/2024       Past Surgical History:   Procedure Laterality Date    AXILLARY HIDRADENITIS EXCISION Bilateral     BONE EXOSTOSIS EXCISION Right 07/25/2018    Procedure: EXCISION, EXOSTOSIS;  Surgeon: Jayro Pedraza Sr., MD;   Location: [facility] OR;  Service: Orthopedics;  Laterality: Right;    BREAST BIOPSY Bilateral     both benign    BREAST SURGERY  1998    CARPAL TUNNEL RELEASE      bilateral    CARPAL TUNNEL RELEASE Right 2024    Procedure: RELEASE, CARPAL TUNNEL;  Surgeon: Jaime Oswald MD;  Location: [facility] OR;  Service: Orthopedics;  Laterality: Right;    CARPAL TUNNEL RELEASE Left 3/8/2024    Procedure: RELEASE, CARPAL TUNNEL;  Surgeon: Jaime Oswald MD;  Location: [facility];  Service: Orthopedics;  Laterality: Left;    CATARACT EXTRACTION Bilateral     OU     SECTION  1979    CHOLECYSTECTOMY  2014    COLONOSCOPY N/A 10/02/2020    Procedure: COLONOSCOPY;  Surgeon: Tushar Edwards MD;  Location: [facility];  Service: Endoscopy;  Laterality: N/A;    COLONOSCOPY W/ POLYPECTOMY  10/02/2020    Polyps x3, repeat 5 years; Tushar Edwards MD     CYST REMOVAL  2015    sebaceous cyst removed from face    DE QUERVAIN'S RELEASE Left 2020    Procedure: RELEASE, HAND, FOR DEQUERVAIN'S TENOSYNOVITIS;  Surgeon: Jiame Oswald MD;  Location: [facility] OR;  Service: Orthopedics;  Laterality: Left;    DE QUERVAIN'S RELEASE Right 2020    Procedure: RELEASE, HAND, FOR DEQUERVAIN'S TENOSYNOVITIS;  Surgeon: Jaime Oswald MD;  Location: [facility];  Service: Orthopedics;  Laterality: Right;    EYE SURGERY      gastric sleeve  2017    Dr. Watson    KNEE SURGERY Right     OLECRANON BURSECTOMY Right 2018    Procedure: BURSECTOMY, OLECRANON;  Surgeon: Jayro Pedraza Sr., MD;  Location: [facility];  Service: Orthopedics;  Laterality: Right;    SURGICAL REMOVAL OF BUNION WITH OSTEOTOMY OF METATARSAL BONE Left 05/10/2019    Procedure: BUNIONECTOMY, WITH METATARSAL OSTEOTOMY;  Surgeon: Srinivasan Villanueva DPM;  Location: [facility];  Service: Podiatry;  Laterality: Left;    SURGICAL REMOVAL OF BUNION WITH OSTEOTOMY OF METATARSAL BONE Right 2019    Procedure: BUNIONECTOMY, WITH METATARSAL OSTEOTOMY;   Surgeon: Srinivasan Villanueva DPM;  Location: [facility];  Service: Podiatry;  Laterality: Right;    TONSILLECTOMY, ADENOIDECTOMY  1980s    TRIGGER FINGER RELEASE Right 2015    Dr. Pedraza       Family History   Problem Relation Age of Onset    Prostate cancer Brother     Diabetes Maternal Aunt     Diabetes Cousin     Hypertension Maternal Grandmother        Social History     Socioeconomic History    Marital status:     Number of children: 1   Occupational History    Occupation:  aid   Tobacco Use    Smoking status: Former     Current packs/day: 0.00     Average packs/day: 0.5 packs/day for 42.0 years (21.0 ttl pk-yrs)     Types: Cigarettes     Start date: 1970     Quit date: 2012     Years since quittin.2     Passive exposure: Past    Smokeless tobacco: Never   Substance and Sexual Activity    Alcohol use: Not Currently     Alcohol/week: 1.0 standard drink of alcohol     Types: 1 Glasses of wine per week     Comment: Glass red wine once a every 2 weeks    Drug use: Never    Sexual activity: Not Currently     Partners: Female     Birth control/protection: Abstinence, None   Social History Narrative    Single, part-time teacher. Masters degree biology.      Social Determinants of Health     Financial Resource Strain: Low Risk  (2023)    Overall Financial Resource Strain (CARDIA)     Difficulty of Paying Living Expenses: Not hard at all   Food Insecurity: Food Insecurity Present (2023)    Hunger Vital Sign     Worried About Running Out of Food in the Last Year: Never true     Ran Out of Food in the Last Year: Sometimes true   Transportation Needs: No Transportation Needs (2023)    PRAPARE - Transportation     Lack of Transportation (Medical): No     Lack of Transportation (Non-Medical): No   Physical Activity: Insufficiently Active (2023)    Exercise Vital Sign     Days of Exercise per Week: 3 days     Minutes of Exercise per Session: 20 min   Stress: No  Stress Concern Present (12/19/2023)    [employer] - Occupational Stress Questionnaire     Feeling of Stress : Not at all   Social Connections: Unknown (12/19/2023)    Social Connection and Isolation Panel [NHANES]     Frequency of Communication with Friends and Family: Never     Frequency of Social Gatherings with Friends and Family: More than three times a week     Active Member of Clubs or Organizations: Patient declined     Attends Club or Organization Meetings: 1 to 4 times per year     Marital Status:    Housing Stability: Low Risk  (12/19/2023)    Housing Stability Vital Sign     Unable to Pay for Housing in the Last Year: No     Number of Places Lived in the Last Year: 1     Unstable Housing in the Last Year: No       Medication List with Changes/Refills   Current Medications    ALBUTEROL (PROVENTIL/VENTOLIN HFA) 90 MCG/ACTUATION INHALER    INHALE 2 PUFFS INTO THE LUNGS EVERY 4 HOURS AS NEEDED FOR WHEEZING OR SHORTNESS OF BREATH.    AMITRIPTYLINE (ELAVIL) 100 MG TABLET    Take 1 tablet (100 mg total) by mouth every evening.    BLOOD GLUCOSE CONTROL, HIGH (TRUE METRIX LEVEL 3) SOLN    1 each by Other route once daily.    BLOOD SUGAR DIAGNOSTIC (ACCU-CHEK GUIDE TEST STRIPS) STRP    USE TO TEST TWICE A DAY    BLOOD-GLUCOSE METER MISC    1 each by Misc.(Non-Drug; Combo Route) route 2 (two) times a day.    CHOLECALCIFEROL, VITAMIN D3, (VITAMIN D3) 25 MCG (1,000 UNIT) CAPSULE    Take 1,000 Units by mouth once daily.    ESZOPICLONE (LUNESTA) 3 MG TAB    Take 1 tablet (3 mg total) by mouth every evening.    HYDROCODONE-ACETAMINOPHEN (NORCO) 5-325 MG PER TABLET    Take 1 tablet by mouth every 6 (six) hours as needed for Pain.    LANCING DEVICE WITH LANCETS (ACCU-CHEK SOFT DEV LANCETS) KIT    1 each by Misc.(Non-Drug; Combo Route) route 2 (two) times a day.    LORAZEPAM (ATIVAN) 1 MG TABLET    Take 1 tablet (1 mg total) by mouth 2 (two) times daily.    LOSARTAN (COZAAR) 25 MG  TABLET    TAKE 1 TABLET BY MOUTH ONCE  DAILY    METOPROLOL SUCCINATE (TOPROL-XL) 50 MG 24 HR TABLET    TAKE 1 TABLET BY MOUTH ONCE  DAILY    OMEPRAZOLE (PRILOSEC) 20 MG CAPSULE    TAKE 1 CAPSULE BY MOUTH ONCE  DAILY    OXYCODONE-ACETAMINOPHEN (PERCOCET)  MG PER TABLET    Take 1 tablet by mouth every 6 (six) hours as needed for Pain.    RSVPREF3 ANTIGEN-AS01E, PF, (AREXVY, PF,) 120 MCG/0.5 ML SUSR VACCINE    Inject into the muscle.    SEMAGLUTIDE (OZEMPIC) 2 MG/DOSE (8 MG/3 ML) PNIJ    Inject 2 mg into the skin every 7 days.    TRUEPLUS LANCETS 33 GAUGE MISC    TEST BLOOD SUGAR ONE TIME DAILY       Review of patient's allergies indicates:  No Known Allergies      Objective:     Right Lower Extremity    HIP:  Pelvis equal   Pain to palpation along the greater trochanter  Groin pain seen with both internal and external rotation  Abbductors/Adductors 5/5  Quad resistance 5/5    KNEE:  ROM: passive flex/ ext full slightly hyperextending  Patella midling, mild to moderate crepitus noted   Ligaments stable  No defect in the patellar quadriceps tendon  Pain on palpation to anterior patella patellofemoral  Patella tracks midline  Calf NT, soft  (-) Prisca sign  DF/PF full  Wiggles toes  Sensation intact to light touch   No pitting edema appreciated   NVI  Cap refill < 2 sec    Skin warm to touch, no obvious lesion noted       IMAGING:    XRAY:  Impression:     1.  Negative for acute process involving the visualized osseous structures.  2.  Age-appropriate degenerative changes as detailed above.    Xray knee  FINDINGS:  No comparison studies are available.  AP standing views, AP flexion views, sunrise views and lateral views of the bilateral knees were submitted for interpretation.  Negative for right or left knee joint effusion.     Alignment is satisfactory. No     fractures, dislocations, or erosive arthritic change.  Negative for radiopaque foreign bodies or air in the soft tissues.  Mild bilateral medial  tibiofemoral compartment joint space narrowing.  Joint spaces otherwise well-maintained with mild tricompartment osteophyte formation.  Vascular calcifications.        Impression:     1.  Negative for acute process involving the right or left knee.  2.  Mild tricompartment degenerative changes most significantly involving the bilateral medial tibiofemoral compartments.    Kellgren Sanchez scale : 3       Assessment:       Encounter Diagnoses   Name Primary?    Bilateral hip pain Yes    Pain in both knees, unspecified chronicity     Primary osteoarthritis of both knees     Lumbar radiculopathy           Plan:       Cassandra was seen today for pain and pain.    Diagnoses and all orders for this visit:    Bilateral hip pain    Pain in both knees, unspecified chronicity    Primary osteoarthritis of both knees  -     Large Joint Aspiration/Injection: L knee    Lumbar radiculopathy  -     X-Ray Lumbar Spine AP And Lateral; Future        Cassandra Campos is a new patient who presents to me today with chief complaint of left knee pain.  The patient also attests to left hip and leg pain. We had a long discussion today regarding degenerative arthritis in the knees. The patient understands that arthritis is chronic and will worsen over time.  The patient also understands that arthritis may cause episodic flare-ups in pain. Management or if arthritis is achieved through a multi-modal approach including weight loss in obese individuals, activity modification, NSAIDs (topical vs oral) where appropriate, periodic intra-articular steroid injections, viscosupplementation, physical therapy, knee bracing, ambulatory aids, as well as geniculate nerve blocks.  We discussed that I do believe some of her pain is referring from her lumbar and I would like to obtain lumbar x-rays on her way out today.  Additionally she can continue the Tylenol as needed to help with the pain.  I would like to add Voltaren gel, muscle relaxer and  Neurontin to her regimen to see if this provides further relief.  We also discussed adding a short acting steroid injection to the left knee today which she wishes to proceed with.  Ask that she refrain from soaking in standing water for 24 hours.  She may advance activity as tolerated with elevation and ice as needed.  I would like to see her back in 3 months for further evaluation.  She may call with any questions or concerns in the interim.    Dr. Latham is aware of the patient & current presentation. He agrees with the current plan above.     Patient verbalized understanding of all instructions and agreed with the above plan.    No follow-ups on file.    The patient understands, chooses and consents to this plan and accepts all   the risks which include but are not limited to the risks mentioned above.     Disclaimer: This note was prepared using a voice recognition system and is likely to have sound alike errors within the text.

## 2024-03-22 NOTE — PROCEDURES
Large Joint Aspiration/Injection: L knee    Date/Time: 3/22/2024 10:00 AM    Performed by: Dayanna Jones PA  Authorized by: Dayanna Jones PA    Consent Done?:  Yes (Verbal)  Indications:  Joint swelling, arthritis and pain  Site marked: the procedure site was marked      Local anesthesia used?: Yes    Local anesthetic:  Topical anesthetic    Details:  Needle Size:  21 G  Approach:  Anterolateral  Location:  Knee  Site:  L knee  Medications:  5 mL LIDOcaine HCL 10 mg/ml (1%) 10 mg/mL (1 %); 80 mg methylPREDNISolone acetate 80 mg/mL  Patient tolerance:  Patient tolerated the procedure well with no immediate complications     Verbal consent was obtained  The patient's ID, site, side was verified  The site was sterile prepped in standard fashion  The injection was performed in the jayna-lateral side without complication  A sterile Band-Aid was applied    Patient was directed to apply ice today at roughly 15 minutes at a time as needed.  It was discussed that they may be sore for the next few days or so.  Please avoid strenuous activity over the next 24 hours.  It was also discussed that the patient may have a increase in glucose if diabetic and should monitor levels.  Patient was instructed to call as needed.

## 2024-03-25 ENCOUNTER — TELEPHONE (OUTPATIENT)
Dept: ORTHOPEDICS | Facility: CLINIC | Age: 74
End: 2024-03-25
Payer: MEDICARE

## 2024-03-25 NOTE — TELEPHONE ENCOUNTER
Called the patient in regards to their appointment. Informed the patient that we just saw them on Friday and did an injection. Informed the patient that it would be to soon for them to come in. Asked the patient if they picked up their prescriptions from the [facility] pharmacy. Patient states that they did not pick them up. Informed the patient to  the medications from the pharmacy. Informed the patient that I will cancel this appointment this afternoon. Patient verbalized understanding.

## 2024-03-26 ENCOUNTER — TELEPHONE (OUTPATIENT)
Dept: RADIOLOGY | Facility: HOSPITAL | Age: 74
End: 2024-03-26
Payer: MEDICARE

## 2024-03-26 NOTE — TELEPHONE ENCOUNTER
Interventional Radiology  Scheduled 8:30AM lung biopsy for 4/4/24 w/patient.  Instructed pt. to arrive by 7:15AM at hospital off [address], she must have a ride and NPO after midnight the night before.  Pt. will skip her 3/29/24 Ozempic injection.  Pt. denies taking ASA and other blood thinners, NSAIDS, or fish oil.  I gave patient our direct callback number [phone] and she verbalized understanding of all instructions.

## 2024-04-01 ENCOUNTER — CLINICAL SUPPORT (OUTPATIENT)
Dept: PULMONOLOGY | Facility: CLINIC | Age: 74
End: 2024-04-01
Payer: MEDICARE

## 2024-04-01 ENCOUNTER — OFFICE VISIT (OUTPATIENT)
Dept: PULMONOLOGY | Facility: CLINIC | Age: 74
End: 2024-04-01
Payer: MEDICARE

## 2024-04-01 ENCOUNTER — LAB VISIT (OUTPATIENT)
Dept: LAB | Facility: HOSPITAL | Age: 74
End: 2024-04-01
Attending: INTERNAL MEDICINE
Payer: MEDICARE

## 2024-04-01 VITALS
WEIGHT: 171.06 LBS | SYSTOLIC BLOOD PRESSURE: 131 MMHG | DIASTOLIC BLOOD PRESSURE: 77 MMHG | BODY MASS INDEX: 30.31 KG/M2 | HEIGHT: 63 IN | RESPIRATION RATE: 16 BRPM | OXYGEN SATURATION: 98 % | BODY MASS INDEX: 30.3 KG/M2 | HEIGHT: 63 IN | HEART RATE: 105 BPM | WEIGHT: 171 LBS

## 2024-04-01 DIAGNOSIS — E11.9 CONTROLLED TYPE 2 DIABETES MELLITUS WITHOUT COMPLICATION, WITHOUT LONG-TERM CURRENT USE OF INSULIN: ICD-10-CM

## 2024-04-01 DIAGNOSIS — J84.10 CALCIFIED GRANULOMA OF LUNG: ICD-10-CM

## 2024-04-01 DIAGNOSIS — E78.5 HYPERLIPIDEMIA ASSOCIATED WITH TYPE 2 DIABETES MELLITUS: Chronic | ICD-10-CM

## 2024-04-01 DIAGNOSIS — R91.8 PULMONARY NODULES: ICD-10-CM

## 2024-04-01 DIAGNOSIS — E11.69 HYPERLIPIDEMIA ASSOCIATED WITH TYPE 2 DIABETES MELLITUS: Chronic | ICD-10-CM

## 2024-04-01 DIAGNOSIS — R91.1 SOLITARY PULMONARY NODULE: ICD-10-CM

## 2024-04-01 DIAGNOSIS — R59.0 HILAR ADENOPATHY: ICD-10-CM

## 2024-04-01 DIAGNOSIS — R59.0 HILAR ADENOPATHY: Primary | ICD-10-CM

## 2024-04-01 DIAGNOSIS — J45.20 MILD INTERMITTENT ASTHMA WITHOUT COMPLICATION: ICD-10-CM

## 2024-04-01 LAB
ALBUMIN SERPL BCP-MCNC: 3.3 G/DL (ref 3.5–5.2)
ALLENS TEST: ABNORMAL
ALP SERPL-CCNC: 108 U/L (ref 55–135)
ALT SERPL W/O P-5'-P-CCNC: 53 U/L (ref 10–44)
ANION GAP SERPL CALC-SCNC: 8 MMOL/L (ref 8–16)
AST SERPL-CCNC: 31 U/L (ref 10–40)
BASOPHILS # BLD AUTO: 0.06 K/UL (ref 0–0.2)
BASOPHILS NFR BLD: 0.7 % (ref 0–1.9)
BILIRUB SERPL-MCNC: 0.3 MG/DL (ref 0.1–1)
BRPFT: NORMAL
BUN SERPL-MCNC: 10 MG/DL (ref 8–23)
CALCIUM SERPL-MCNC: 9.8 MG/DL (ref 8.7–10.5)
CHLORIDE SERPL-SCNC: 106 MMOL/L (ref 95–110)
CO2 SERPL-SCNC: 26 MMOL/L (ref 23–29)
CREAT SERPL-MCNC: 0.7 MG/DL (ref 0.5–1.4)
DELSYS: ABNORMAL
DIFFERENTIAL METHOD BLD: ABNORMAL
DLCO ADJ PRE: 13.93 ML/(MIN*MMHG)
DLCO SINGLE BREATH LLN: 14.34
DLCO SINGLE BREATH PRE REF: 69.4 %
DLCO SINGLE BREATH REF: 20.07
DLCOC SBVA LLN: 2.72
DLCOC SBVA PRE REF: 124.9 %
DLCOC SBVA REF: 4.21
DLCOC SINGLE BREATH LLN: 14.34
DLCOC SINGLE BREATH PRE REF: 69.4 %
DLCOC SINGLE BREATH REF: 20.07
DLCOVA LLN: 2.72
DLCOVA PRE REF: 124.9 %
DLCOVA PRE: 5.25 ML/(MIN*MMHG*L)
DLCOVA REF: 4.21
DLVAADJ PRE: 5.25 ML/(MIN*MMHG*L)
EOSINOPHIL # BLD AUTO: 0.1 K/UL (ref 0–0.5)
EOSINOPHIL NFR BLD: 1.1 % (ref 0–8)
ERV LLN: -16449.42
ERV PRE REF: 70.9 %
ERV REF: 0.58
ERYTHROCYTE [DISTWIDTH] IN BLOOD BY AUTOMATED COUNT: 16 % (ref 11.5–14.5)
EST. GFR  (NO RACE VARIABLE): >60 ML/MIN/1.73 M^2
FEF 25 75 LLN: 0.53
FEF 25 75 PRE REF: 82 %
FEF 25 75 REF: 1.48
FEV1 FVC LLN: 65
FEV1 FVC PRE REF: 98.7 %
FEV1 FVC REF: 78
FEV1 LLN: 1.18
FEV1 PRE REF: 80.4 %
FEV1 REF: 1.73
FIO2: 21
FRCPLETH LLN: 1.84
FRCPLETH PREREF: 74.6 %
FRCPLETH REF: 2.66
FVC LLN: 1.55
FVC PRE REF: 80.8 %
FVC REF: 2.23
GLUCOSE SERPL-MCNC: 92 MG/DL (ref 70–110)
HCO3 UR-SCNC: 27.9 MMOL/L (ref 24–28)
HCT VFR BLD AUTO: 41.5 % (ref 37–48.5)
HGB BLD-MCNC: 13.7 G/DL (ref 12–16)
IMM GRANULOCYTES # BLD AUTO: 0.03 K/UL (ref 0–0.04)
IMM GRANULOCYTES NFR BLD AUTO: 0.4 % (ref 0–0.5)
IVC PRE: 1.52 L
IVC SINGLE BREATH LLN: 1.55
IVC SINGLE BREATH PRE REF: 68 %
IVC SINGLE BREATH REF: 2.23
LYMPHOCYTES # BLD AUTO: 2.6 K/UL (ref 1–4.8)
LYMPHOCYTES NFR BLD: 30.7 % (ref 18–48)
MCH RBC QN AUTO: 25.4 PG (ref 27–31)
MCHC RBC AUTO-ENTMCNC: 33 G/DL (ref 32–36)
MCV RBC AUTO: 77 FL (ref 82–98)
MODE: ABNORMAL
MONOCYTES # BLD AUTO: 0.7 K/UL (ref 0.3–1)
MONOCYTES NFR BLD: 8.9 % (ref 4–15)
MVV LLN: 64
MVV PRE REF: 53.5 %
MVV REF: 75
NEUTROPHILS # BLD AUTO: 4.9 K/UL (ref 1.8–7.7)
NEUTROPHILS NFR BLD: 58.2 % (ref 38–73)
NRBC BLD-RTO: 0 /100 WBC
PCO2 BLDA: 41.4 MMHG (ref 35–45)
PEF LLN: 2.39
PEF PRE REF: 109.4 %
PEF REF: 4.34
PH SMN: 7.44 [PH] (ref 7.35–7.45)
PLATELET # BLD AUTO: 366 K/UL (ref 150–450)
PMV BLD AUTO: 9.7 FL (ref 9.2–12.9)
PO2 BLDA: 82 MMHG (ref 80–100)
POC BE: 4 MMOL/L
POC SATURATED O2: 96 % (ref 95–100)
POTASSIUM SERPL-SCNC: 3.8 MMOL/L (ref 3.5–5.1)
PRE DLCO: 13.93 ML/(MIN*MMHG)
PRE ERV: 0.41 L
PRE FEF 25 75: 1.21 L/S
PRE FET 100: 7.07 SEC
PRE FEV1 FVC: 77.14 %
PRE FEV1: 1.39 L
PRE FRC PL: 1.98 L
PRE FVC: 1.8 L
PRE MVV: 40 L/MIN
PRE PEF: 4.75 L/S
PRE RV: 1.38 L
PRE TLC: 3.27 L
PROT SERPL-MCNC: 7.7 G/DL (ref 6–8.4)
RAW LLN: 3.06
RAW PRE REF: 159.4 %
RAW PRE: 4.88 CMH2O*S/L
RAW REF: 3.06
RBC # BLD AUTO: 5.4 M/UL (ref 4–5.4)
RV LLN: 1.5
RV PRE REF: 66.4 %
RV REF: 2.08
RVTLC LLN: 35
RVTLC PRE REF: 95.7 %
RVTLC PRE: 42.21 %
RVTLC REF: 44
SAMPLE: ABNORMAL
SITE: ABNORMAL
SODIUM SERPL-SCNC: 140 MMOL/L (ref 136–145)
TLC LLN: 3.78
TLC PRE REF: 68.6 %
TLC REF: 4.77
VA PRE: 2.66 L
VA SINGLE BREATH LLN: 4.62
VA SINGLE BREATH PRE REF: 57.6 %
VA SINGLE BREATH REF: 4.62
VC LLN: 1.55
VC PRE REF: 84.8 %
VC PRE: 1.89 L
VC REF: 2.23
VTGRAWPRE: 2.36 L
WBC # BLD AUTO: 8.34 K/UL (ref 3.9–12.7)

## 2024-04-01 PROCEDURE — 1101F PT FALLS ASSESS-DOCD LE1/YR: CPT | Mod: CPTII,S$GLB,, | Performed by: INTERNAL MEDICINE

## 2024-04-01 PROCEDURE — 1159F MED LIST DOCD IN RCRD: CPT | Mod: CPTII,S$GLB,, | Performed by: INTERNAL MEDICINE

## 2024-04-01 PROCEDURE — 99999 PR PBB SHADOW E&M-EST. PATIENT-LVL V: CPT | Mod: PBBFAC,,, | Performed by: INTERNAL MEDICINE

## 2024-04-01 PROCEDURE — 94618 PULMONARY STRESS TESTING: CPT | Mod: S$GLB,,, | Performed by: INTERNAL MEDICINE

## 2024-04-01 PROCEDURE — 36415 COLL VENOUS BLD VENIPUNCTURE: CPT | Performed by: INTERNAL MEDICINE

## 2024-04-01 PROCEDURE — 3288F FALL RISK ASSESSMENT DOCD: CPT | Mod: CPTII,S$GLB,, | Performed by: INTERNAL MEDICINE

## 2024-04-01 PROCEDURE — 1160F RVW MEDS BY RX/DR IN RCRD: CPT | Mod: CPTII,S$GLB,, | Performed by: INTERNAL MEDICINE

## 2024-04-01 PROCEDURE — 94726 PLETHYSMOGRAPHY LUNG VOLUMES: CPT | Mod: S$GLB,,, | Performed by: INTERNAL MEDICINE

## 2024-04-01 PROCEDURE — 82803 BLOOD GASES ANY COMBINATION: CPT | Mod: S$GLB,,, | Performed by: INTERNAL MEDICINE

## 2024-04-01 PROCEDURE — 36600 WITHDRAWAL OF ARTERIAL BLOOD: CPT | Mod: 59,S$GLB,, | Performed by: INTERNAL MEDICINE

## 2024-04-01 PROCEDURE — 85025 COMPLETE CBC W/AUTO DIFF WBC: CPT | Performed by: INTERNAL MEDICINE

## 2024-04-01 PROCEDURE — 3078F DIAST BP <80 MM HG: CPT | Mod: CPTII,S$GLB,, | Performed by: INTERNAL MEDICINE

## 2024-04-01 PROCEDURE — 3008F BODY MASS INDEX DOCD: CPT | Mod: CPTII,S$GLB,, | Performed by: INTERNAL MEDICINE

## 2024-04-01 PROCEDURE — 4010F ACE/ARB THERAPY RXD/TAKEN: CPT | Mod: CPTII,S$GLB,, | Performed by: INTERNAL MEDICINE

## 2024-04-01 PROCEDURE — 94729 DIFFUSING CAPACITY: CPT | Mod: S$GLB,,, | Performed by: INTERNAL MEDICINE

## 2024-04-01 PROCEDURE — 80053 COMPREHEN METABOLIC PANEL: CPT | Performed by: INTERNAL MEDICINE

## 2024-04-01 PROCEDURE — 99214 OFFICE O/P EST MOD 30 MIN: CPT | Mod: 25,S$GLB,, | Performed by: INTERNAL MEDICINE

## 2024-04-01 PROCEDURE — 3075F SYST BP GE 130 - 139MM HG: CPT | Mod: CPTII,S$GLB,, | Performed by: INTERNAL MEDICINE

## 2024-04-01 PROCEDURE — 94010 BREATHING CAPACITY TEST: CPT | Mod: 59,S$GLB,, | Performed by: INTERNAL MEDICINE

## 2024-04-01 RX ORDER — SODIUM CHLORIDE 9 MG/ML
INJECTION, SOLUTION INTRAVENOUS CONTINUOUS
Status: CANCELLED | OUTPATIENT
Start: 2024-04-01

## 2024-04-01 RX ORDER — LIDOCAINE HYDROCHLORIDE 40 MG/ML
4 SOLUTION TOPICAL ONCE
Status: CANCELLED | OUTPATIENT
Start: 2024-04-01 | End: 2024-04-01

## 2024-04-01 RX ORDER — LIDOCAINE HYDROCHLORIDE 20 MG/ML
20 INJECTION, SOLUTION INFILTRATION; PERINEURAL ONCE
Status: CANCELLED | OUTPATIENT
Start: 2024-04-01 | End: 2024-04-01

## 2024-04-01 NOTE — PROGRESS NOTES
"                                           Pulmonary Outpatient  Visit     Subjective:       Patient ID: Cassandra Campos is a 73 y.o. female.    Social History     Tobacco Use   Smoking Status Former    Current packs/day: 0.00    Average packs/day: 0.5 packs/day for 42.0 years (21.0 ttl pk-yrs)    Types: Cigarettes    Start date: 1970    Quit date: 2012    Years since quittin.2    Passive exposure: Past   Smokeless Tobacco Never            Chief Complaint: Follow-up (2 week// rev abg,walk,PFT)          Cassandra Campos is 73 y.o.  Asked to see by Dr Ball  Abn imaging CT abdomen   No cough no wheezing no shortness of breath   Last seen in this department in to any to any   History of asthma on inhaler   Former smoker quit 20 years ago   Retired teacher   No past no history of cancer   No family history of cancer   Sinus congestion - hole in nose from snorting cocaine  Smoked 1ppd for many years - not smoking now, 15 pack years  Denies weight loss hemoptysis no TB exposure       Imaging reviewed with patient          2024  Followup  Revewed PFT  ABG  Walk  CT biopsy for   Will do EBUS on 04/10  Will need cardiology clearance          Solitary Pulmonary Nodule (SPN) Malignancy Risk Score ([facility] Model) from [email or web address]  on 3/18/2024  ** All calculations should be rechecked by clinician prior to use **    RESULT SUMMARY:  95.7 %  Probability of malignancy      One study suggests watchful waiting only at very low post-test probabilities (<2%), biopsy at "lower" post-test probabilities (2% to 20%), and surgery at higher post-test probabilities (>70%). See Next Steps.      INPUTS:  Age --> 73 years  Nodule diameter --> 23 mm  Current or former smoker --> 1 = Yes  Extrathoracic cancer diagnosis ?5 years prior --> 0 = No  Upper lobe location of tumor --> 1 = Yes  Nodule spiculation --> 1 = Yes  FDG-PET --> 4 = Intense uptake    Situation Frequency of Problem   My cough is worse when I " lie down   1    My coughing problem causes me to restrict my personal and social life. 0     I tend to avoid places because of my cough problem. 0     I feel embarrassed because of my coughing problem. 0    People ask, Whats wrong? Because I cough a lot. 0    I run out of air when I cough. 0    My coughing problem affects my voice.   1     My coughing problem limits my physical activity 0     My coughing problem upsets me   1    People ask me if I am sick because I cough a lot. 0        CSI 3            Review of Systems   Constitutional: Negative.    Eyes: Negative.    Respiratory:  Negative for snoring, cough, sputum production, shortness of breath, wheezing, use of rescue inhaler and somnolence.    Cardiovascular: Negative.    Genitourinary: Negative.    Endocrine: endocrine negative    Musculoskeletal: Negative.    Skin: Negative.    Gastrointestinal: Negative.    Psychiatric/Behavioral: Negative.     All other systems reviewed and are negative.      Outpatient Encounter Medications as of 4/1/2024   Medication Sig Dispense Refill    albuterol (PROVENTIL/VENTOLIN HFA) 90 mcg/actuation inhaler INHALE 2 PUFFS INTO THE LUNGS EVERY 4 HOURS AS NEEDED FOR WHEEZING OR SHORTNESS OF BREATH. 18 g 1    amitriptyline (ELAVIL) 100 MG tablet Take 1 tablet (100 mg total) by mouth every evening. 90 tablet 1    blood glucose control, high (TRUE METRIX LEVEL 3) Soln 1 each by Other route once daily. 1 each 5    blood sugar diagnostic (ACCU-CHEK GUIDE TEST STRIPS) Strp USE TO TEST TWICE A  strip 3    blood-glucose meter Misc 1 each by Misc.(Non-Drug; Combo Route) route 2 (two) times a day. 1 each 1    cholecalciferol, vitamin D3, (VITAMIN D3) 25 mcg (1,000 unit) capsule Take 1,000 Units by mouth once daily.      eszopiclone (LUNESTA) 3 mg Tab Take 1 tablet (3 mg total) by mouth every evening. 30 tablet 2    gabapentin (NEURONTIN) 100 MG capsule Take 1 capsule (100 mg total) by mouth every evening. 30 capsule 0     LORazepam (ATIVAN) 1 MG tablet Take 1 tablet (1 mg total) by mouth 2 (two) times daily. 60 tablet 2    losartan (COZAAR) 25 MG tablet TAKE 1 TABLET BY MOUTH ONCE  DAILY 90 tablet 3    methocarbamoL (ROBAXIN) 500 MG Tab Take 1 tablet (500 mg total) by mouth 3 (three) times daily. for 10 days 30 tablet 0    metoprolol succinate (TOPROL-XL) 50 MG 24 hr tablet TAKE 1 TABLET BY MOUTH ONCE  DAILY 90 tablet 2    omeprazole (PRILOSEC) 20 MG capsule TAKE 1 CAPSULE BY MOUTH ONCE  DAILY 90 capsule 2    semaglutide (OZEMPIC) 2 mg/dose (8 mg/3 mL) PnIj Inject 2 mg into the skin every 7 days. 3 mL 2    TRUEPLUS LANCETS 33 gauge Misc TEST BLOOD SUGAR ONE TIME DAILY 100 each 3    lancing device with lancets (ACCU-CHEK SOFT DEV LANCETS) Kit 1 each by Misc.(Non-Drug; Combo Route) route 2 (two) times a day. 1 each 4    [DISCONTINUED] HYDROcodone-acetaminophen (NORCO) 5-325 mg per tablet Take 1 tablet by mouth every 6 (six) hours as needed for Pain. 15 tablet 0    [DISCONTINUED] losartan (COZAAR) 25 MG tablet TAKE 1 TABLET BY MOUTH ONCE  DAILY 90 tablet 2    [DISCONTINUED] oxyCODONE-acetaminophen (PERCOCET)  mg per tablet Take 1 tablet by mouth every 6 (six) hours as needed for Pain. 28 tablet 0    [DISCONTINUED] RSVPreF3 antigen-AS01E, PF, (AREXVY, PF,) 120 mcg/0.5 mL SusR vaccine Inject into the muscle. 0.5 mL 0     Facility-Administered Encounter Medications as of 4/1/2024   Medication Dose Route Frequency Provider Last Rate Last Admin    chlorhexidine 0.12 % solution 10 mL  10 mL Mouth/Throat On Call Procedure Falguni Lindquist PA-C        lactated ringers infusion   Intravenous On Call Procedure Dayanna Jones PA   New Bag at 11/20/20 0912    nozaseptin (NOZIN) nasal    Each Nostril On Call Procedure Dayanna Jones PA   Given at 11/20/20 0825       The following portions of the patient's history were reviewed and updated as appropriate: She  has a past medical history of Arthritis, Bilateral bunions,  Borderline glaucoma, De Quervain's disease (radial styloid tenosynovitis), Gastritis, Hydradenitis, Hyperlipidemia, Hypertension, Insomnia, Migraines (2000), Nasal septum perforation, Obesity, Pneumonia, Restrictive airway disease, Sleep apnea, SVT (supraventricular tachycardia) (2013), Trigger finger, and Type 2 diabetes mellitus ().  She does not have any pertinent problems on file.  She  has a past surgical history that includes Cholecystectomy (2014); Carpal tunnel release; Axillary hidradenitis excision (Bilateral); Trigger finger release (Right, 2015); Cyst Removal (2015); TONSILLECTOMY, ADENOIDECTOMY (1980s);  section (); gastric sleeve (2017); Knee surgery (Right); Cataract extraction (Bilateral); Breast biopsy (Bilateral); Olecranon bursectomy (Right, 2018); Bone exostosis excision (Right, 2018); Surgical removal of bunion with osteotomy of metatarsal bone (Left, 05/10/2019); Surgical removal of bunion with osteotomy of metatarsal bone (Right, 2019); De Quervain's release (Left, 2020); Colonoscopy w/ polypectomy (10/02/2020); Colonoscopy (N/A, 10/02/2020); De Quervain's release (Right, 2020); Eye surgery; Breast surgery (1998); Carpal tunnel release (Right, 2024); and Carpal tunnel release (Left, 3/8/2024).  Her family history includes Diabetes in her cousin and maternal aunt; Hypertension in her maternal grandmother; Prostate cancer in her brother.  She  reports that she quit smoking about 12 years ago. Her smoking use included cigarettes. She started smoking about 54 years ago. She has a 21.0 pack-year smoking history. She has been exposed to tobacco smoke. She has never used smokeless tobacco. She reports that she does not currently use alcohol after a past usage of about 1.0 standard drink of alcohol per week. She reports that she does not use drugs.  She has a current medication list which includes the following  prescription(s): albuterol, amitriptyline, true metrix level 3, blood sugar diagnostic, blood-glucose meter, cholecalciferol (vitamin d3), eszopiclone, gabapentin, lorazepam, losartan, methocarbamol, metoprolol succinate, omeprazole, ozempic, trueplus lancets, and lancing device with lancets, and the following Facility-Administered Medications: chlorhexidine, lactated ringers, and nozaseptin.  Current Outpatient Medications on File Prior to Visit   Medication Sig Dispense Refill    albuterol (PROVENTIL/VENTOLIN HFA) 90 mcg/actuation inhaler INHALE 2 PUFFS INTO THE LUNGS EVERY 4 HOURS AS NEEDED FOR WHEEZING OR SHORTNESS OF BREATH. 18 g 1    amitriptyline (ELAVIL) 100 MG tablet Take 1 tablet (100 mg total) by mouth every evening. 90 tablet 1    blood glucose control, high (TRUE METRIX LEVEL 3) Soln 1 each by Other route once daily. 1 each 5    blood sugar diagnostic (ACCU-CHEK GUIDE TEST STRIPS) Strp USE TO TEST TWICE A  strip 3    blood-glucose meter Misc 1 each by Misc.(Non-Drug; Combo Route) route 2 (two) times a day. 1 each 1    cholecalciferol, vitamin D3, (VITAMIN D3) 25 mcg (1,000 unit) capsule Take 1,000 Units by mouth once daily.      eszopiclone (LUNESTA) 3 mg Tab Take 1 tablet (3 mg total) by mouth every evening. 30 tablet 2    gabapentin (NEURONTIN) 100 MG capsule Take 1 capsule (100 mg total) by mouth every evening. 30 capsule 0    LORazepam (ATIVAN) 1 MG tablet Take 1 tablet (1 mg total) by mouth 2 (two) times daily. 60 tablet 2    losartan (COZAAR) 25 MG tablet TAKE 1 TABLET BY MOUTH ONCE  DAILY 90 tablet 3    methocarbamoL (ROBAXIN) 500 MG Tab Take 1 tablet (500 mg total) by mouth 3 (three) times daily. for 10 days 30 tablet 0    metoprolol succinate (TOPROL-XL) 50 MG 24 hr tablet TAKE 1 TABLET BY MOUTH ONCE  DAILY 90 tablet 2    omeprazole (PRILOSEC) 20 MG capsule TAKE 1 CAPSULE BY MOUTH ONCE  DAILY 90 capsule 2    semaglutide (OZEMPIC) 2 mg/dose (8 mg/3 mL) PnIj Inject 2 mg into the skin every  "7 days. 3 mL 2    TRUEPLUS LANCETS 33 gauge Misc TEST BLOOD SUGAR ONE TIME DAILY 100 each 3    lancing device with lancets (ACCU-CHEK SOFT DEV LANCETS) Kit 1 each by Misc.(Non-Drug; Combo Route) route 2 (two) times a day. 1 each 4    [DISCONTINUED] HYDROcodone-acetaminophen (NORCO) 5-325 mg per tablet Take 1 tablet by mouth every 6 (six) hours as needed for Pain. 15 tablet 0    [DISCONTINUED] oxyCODONE-acetaminophen (PERCOCET)  mg per tablet Take 1 tablet by mouth every 6 (six) hours as needed for Pain. 28 tablet 0    [DISCONTINUED] RSVPreF3 antigen-AS01E, PF, (AREXVY, PF,) 120 mcg/0.5 mL SusR vaccine Inject into the muscle. 0.5 mL 0     Current Facility-Administered Medications on File Prior to Visit   Medication Dose Route Frequency Provider Last Rate Last Admin    chlorhexidine 0.12 % solution 10 mL  10 mL Mouth/Throat On Call Procedure Falguni Lindquist PA-C        lactated ringers infusion   Intravenous On Call Procedure Dayanna Jones PA   New Bag at 11/20/20 0912    nozaseptin (NOZIN) nasal    Each Nostril On Call Procedure Dayanna Jones PA   Given at 11/20/20 0825     She has No Known Allergies..      BP Readings from Last 3 Encounters:   04/01/24 131/77   03/22/24 123/89   03/18/24 122/80     Snoring / Sleep:            MMRC Dyspnea Scale (4 is worst)     [x] MMRC 0: Dyspneic on strenuous excercise (0 points)    [] MMRC 1: Dyspneic on walking a slight hill (0 points)    [] MMRC 2: Dyspneic on walking level ground; must stop occasionally due to breathlessness (1 point)    [] MMRC 3: Must stop for breathlessness after walking 100 yards or after a few minutes (2 points)    [] MMRC 4: Cannot leave house; breathless on dressing/undressing (3 points)                          No data to display                          Objective:     Vital Signs (Most Recent)  Vital Signs  Pulse: 105  Resp: 16  SpO2: 98 %  BP: 131/77  Height and Weight  Height: 5' 3" (160 cm)  Weight: 77.6 kg (171 lb 1.2 " oz)  BSA (Calculated - sq m): 1.86 sq meters  BMI (Calculated): 30.3  Weight in (lb) to have BMI = 25: 140.8]  Wt Readings from Last 2 Encounters:   04/01/24 77.6 kg (170 lb 15.8 oz)   04/01/24 77.6 kg (171 lb 1.2 oz)       Physical Exam  Vitals and nursing note reviewed.   Constitutional:       Appearance: She is normal weight.   HENT:      Head: Normocephalic and atraumatic.      Nose: Nose normal.   Eyes:      Pupils: Pupils are equal, round, and reactive to light.   Cardiovascular:      Rate and Rhythm: Normal rate and regular rhythm.      Pulses: Normal pulses.      Heart sounds: Normal heart sounds.   Pulmonary:      Effort: Pulmonary effort is normal.      Breath sounds: Normal breath sounds.   Abdominal:      General: Bowel sounds are normal.      Palpations: Abdomen is soft.   Musculoskeletal:      Cervical back: Normal range of motion.   Skin:     General: Skin is warm.   Neurological:      General: No focal deficit present.      Mental Status: She is alert and oriented to person, place, and time.   Psychiatric:         Mood and Affect: Mood normal.          Laboratory  Lab Results   Component Value Date    WBC 7.12 02/01/2024    RBC 5.50 (H) 02/01/2024    HGB 13.9 02/01/2024    HCT 41.8 02/01/2024    MCV 76 (L) 02/01/2024    MCH 25.3 (L) 02/01/2024    MCHC 33.3 02/01/2024    RDW 15.0 (H) 02/01/2024     02/01/2024    MPV 9.8 02/01/2024    GRAN 3.7 02/01/2024    GRAN 52.6 02/01/2024    LYMPH 2.6 02/01/2024    LYMPH 35.8 02/01/2024    MONO 0.7 02/01/2024    MONO 9.7 02/01/2024    EOS 0.1 02/01/2024    BASO 0.03 02/01/2024    EOSINOPHIL 1.4 02/01/2024    BASOPHIL 0.4 02/01/2024       BMP  Lab Results   Component Value Date     02/01/2024    K 4.1 02/01/2024     02/01/2024    CO2 26 02/01/2024    BUN 7 (L) 02/01/2024    CREATININE 0.7 02/01/2024    CALCIUM 9.7 02/01/2024    ANIONGAP 7 (L) 02/01/2024    ESTGFRAFRICA >60 06/21/2022    EGFRNONAA >60 06/21/2022    AST 26 12/27/2023    ALT 58 (H)  "12/27/2023    PROT 8.3 12/27/2023          No results found for: "IGE"     No results found for: "ASPERGILLUS"  No results found for: "AFUMIGATUSCL"     No results found for: "ACE"     Diagnostic Results:  I have personally reviewed today the following studies:    X-Ray Lumbar Spine AP And Lateral  Narrative: EXAM: XR LUMBAR SPINE AP AND LATERAL, 3 images    CLINICAL INDICATION:    Low back pain    FINDINGS:  No comparison studies are available. There are 5 weight bearing lumbar vertebra. Mild L5/S1 disc height reduction.  Multilevel marginal spondylosis.  Marked lower lumbar spine degenerative facet arthropathy. The vertebral body heights and intervertebral disc heights are otherwise well-maintained. Negative for     spondylolysis or spondylolisthesis.  Posterior elements are intact.    The sacral ala and sacroiliac joints are intact. There is sclerosis of the sacroiliac joints.    The bowel gas pattern is normal. Vascular calcifications are present without aneurysmal change. Cholecystectomy clips.  Postoperative changes in the left upper quadrant.  Impression: 1.  Negative for acute process involving the lumbar spine.  2.  Degenerative disc changes and degenerative facet arthropathy most significantly involving L5/S1.  3.  Incidental findings as noted above.    Finalized on: 3/22/2024 12:18 PM By:  Delfino Cox MD  [facility]# [id number]      2024-03-22 12:20:10.726    [facility]  X-ray Knee Ortho Bilateral with Flexion  Narrative: EXAM: XR KNEE ORTHO BILAT WITH FLEXION    CLINICAL INDICATION:   Pain in right knee.  Pain in left knee.    FINDINGS:  No comparison studies are available.  AP standing views, AP flexion views, sunrise views and lateral views of the bilateral knees were submitted for interpretation.  Negative for right or left knee joint effusion.    Alignment is satisfactory. No     fractures, dislocations, or erosive arthritic change.  Negative for radiopaque foreign bodies or air in the soft tissues.  Mild " "bilateral medial tibiofemoral compartment joint space narrowing.  Joint spaces otherwise well-maintained with mild tricompartment osteophyte formation.  Vascular calcifications.  Impression: 1.  Negative for acute process involving the right or left knee.  2.  Mild tricompartment degenerative changes most significantly involving the bilateral medial tibiofemoral compartments.    Finalized on: 3/22/2024 11:09 AM By:  Delfino Cox MD  [facility]# [id number]      2024-03-22 11:11:15.952    [facility]  X-Ray Hips Bilateral 2 View Incl AP Pelvis  Narrative: EXAM: XR HIPS BILATERAL 2 VIEW INCL AP PELVIS    CLINICAL INDICATION:   Pelvis and perineal pain.  Pain in right hip.  Pain in left hip.    FINDINGS:  No comparison studies are available.  5 total views of the pelvis and bilateral hips were submitted for interpretation.  Left greater than right axial hip joint space narrowing.  Moderate enthesopathic changes involve the pelvis.  Moderate degenerative changes of the lower lumbar spine.    Alignment is satisfactory. No     fractures, dislocations, or erosive arthritic change.  Negative for radiopaque foreign bodies or air in the soft tissues.    Normal bowel gas pattern.  Phleboliths versus ureteroliths are present within the pelvis.  Vascular calcifications.  Impression: 1.  Negative for acute process involving the visualized osseous structures.  2.  Age-appropriate degenerative changes as detailed above.    Finalized on: 3/22/2024 10:05 AM By:  Delfino Cox MD  [facility]# [id number]      2024-03-22 10:08:02.240    [facility]         Recent Labs     04/01/24  1059   PH 7.438   PCO2 41.4   PO2 82   HCO3 27.9   POCSATURATED 96   BE 4*        Ordering Provider: Dr. Robin          Interpreting Provider: Dr. Robin  Performing nurse/tech/RT: ROSAURA Manuel, RRT  Diagnosis:  (pulmonary nodule)  Height: 5' 3" (160 cm)  Weight: 77.6 kg (170 lb 15.8 oz)  BMI (Calculated): 30.3              Patient Race:                                      "                            Phase Oxygen Assessment Supplemental O2 Heart   Rate Blood Pressure Pollo Dyspnea Scale Rating   Resting 96 % Room Air 109 bpm 116/74 0   Exercise             Minute             1 95 % Room Air 120 bpm       2 95 % Room Air 120 bpm       3 95 % Room Air 121 bpm       4 97 % Room Air 121 bpm       5 96 % Room Air 122 bpm       6  96 % Room Air 120 bpm 132/74 2   Recovery             Minute             1 99 % Room Air 118 bpm       2 99 % Room Air 108 bpm       3 99 % Room Air 107 bpm       4 98 % Room Air 105 bpm 131/77 0      Six Minute Walk Summary  6MWT Status: completed without stopping  Patient Reported: Dyspnea         Interpretation:  Did the patient stop or pause?: No  Total Time Walked (Calculated): 360 seconds  Final Partial Lap Distance (feet): 150 feet  Total Distance Meters (Calculated): 289.56 meters  Predicted Distance Meters (Calculated): 399.27 meters  Percentage of Predicted (Calculated): 72.52  Peak VO2 (Calculated): 12.67  Mets: 3.62  Has The Patient Had a Previous Six Minute Walk Test?: No     Previous 6MWT Results  Has The Patient Had a Previous Six Minute Walk Test?: No      PFT  FEV1: 1.39L( 80.8%), FVC 1.39L( 80.4%), FEV1/FVC 77  TLC 3.27L(68.6%)  DLCo 13.93( 69.4%)                Assessment/Plan:     Problem List Items Addressed This Visit       Hyperlipidemia associated with type 2 diabetes mellitus (Chronic)    Calcified granuloma of lung    BMI 31.0-31.9,adult    Controlled type 2 diabetes mellitus without complication, without long-term current use of insulin    Mild intermittent asthma    Solitary pulmonary nodule    Relevant Orders    Case Request Endoscopy: ENDOBRONCHIAL ULTRASOUND (EBUS) (Completed)    Ambulatory referral/consult to Cardiology    Hilar adenopathy - Primary     EBUS for station 10 L for mediastinal staging         Relevant Orders    Case Request Endoscopy: ENDOBRONCHIAL ULTRASOUND (EBUS) (Completed)    Ambulatory referral/consult to  Cardiology    CBC auto differential    Comprehensive Metabolic Panel        Would be surgical candidate if mediastinal staging -ve  CT biopsy on 04/04 ( lieu of Robotic due to technical issues) then ediastinal staging next week      My diagnostic impression and work-up plan were discussed at length with patient. Risks were discussed. EBUS procedure. Complications of the procedure discussed in detail with patient. Complications including but not limited to infection that may require hospital admission, bleeding that may require blood transfusion and or hospital admission, perforation of the lung which may require surgery,chance of injury to the throat, windpipe or bronchial tubes, laryngospam, coughing, aspiration, hypoxemia or cardiac arrythmias. Patient expressed and verbalized understanding. The material risks of anesthesia in connection with the procedure including brain damage, paralysis from the neck downwards, paralysis from the waist downwards, loss of function of an arm or a leg, disfigurement (incluing scars)and death were discussed with patient who expressedand verbalized understanding. Alternate treatments and material risks associated with such alternatives were discussed with pateint. These include radiologic surveillance with minimal risk and sugery with an indeterminate risk. The material risks of refusing the procedure was discussed in detail. This includes no diagnosis or confirmation of diagnisis and rendering of appropriate treatment the risk of which depends on the nature of the diagnosed illness. Patient expressed and verbalized understanding. Procedure scheduled for date 04/10/2024. Consent signed. Orders entered. Coagulation studies per orders.   All questions were answered and the patient expressed understanding      Follow up in about 4 weeks (around 4/29/2024), or Ebus consent, ref cardio, Labs today.    This note was prepared using voice recognition system and is likely to have sound alike  errors that may have been overlooked even after proof reading.  Please call me with any questions    Discussed diagnosis, its evaluation, treatment and usual course. All questions answered.    Thank you for the courtesy of participating in the care of this patient    Adrian Robin MD      Personal Diagnostic Review  []  CXR    []  ECHO    []  ONSAT    []  6MWD    []  LABS    []  CHEST CT    []  PET CT    []  Biopsy results

## 2024-04-01 NOTE — H&P (VIEW-ONLY)
"                                           Pulmonary Outpatient  Visit     Subjective:       Patient ID: Cassandra Campos is a 73 y.o. female.    Social History     Tobacco Use   Smoking Status Former    Current packs/day: 0.00    Average packs/day: 0.5 packs/day for 42.0 years (21.0 ttl pk-yrs)    Types: Cigarettes    Start date: 1970    Quit date: 2012    Years since quittin.2    Passive exposure: Past   Smokeless Tobacco Never            Chief Complaint: Follow-up (2 week// rev abg,walk,PFT)          Cassandra Campos is 73 y.o.  Asked to see by Dr Ball  Abn imaging CT abdomen   No cough no wheezing no shortness of breath   Last seen in this department in to any to any   History of asthma on inhaler   Former smoker quit 20 years ago   Retired teacher   No past no history of cancer   No family history of cancer   Sinus congestion - hole in nose from snorting cocaine  Smoked 1ppd for many years - not smoking now, 15 pack years  Denies weight loss hemoptysis no TB exposure       Imaging reviewed with patient          2024  Followup  Revewed PFT  ABG  Walk  CT biopsy for   Will do EBUS on 04/10  Will need cardiology clearance          Solitary Pulmonary Nodule (SPN) Malignancy Risk Score ([facility] Model) from [email or web address]  on 3/18/2024  ** All calculations should be rechecked by clinician prior to use **    RESULT SUMMARY:  95.7 %  Probability of malignancy      One study suggests watchful waiting only at very low post-test probabilities (<2%), biopsy at "lower" post-test probabilities (2% to 20%), and surgery at higher post-test probabilities (>70%). See Next Steps.      INPUTS:  Age --> 73 years  Nodule diameter --> 23 mm  Current or former smoker --> 1 = Yes  Extrathoracic cancer diagnosis ?5 years prior --> 0 = No  Upper lobe location of tumor --> 1 = Yes  Nodule spiculation --> 1 = Yes  FDG-PET --> 4 = Intense uptake    Situation Frequency of Problem   My cough is worse when I " lie down   1    My coughing problem causes me to restrict my personal and social life. 0     I tend to avoid places because of my cough problem. 0     I feel embarrassed because of my coughing problem. 0    People ask, Whats wrong? Because I cough a lot. 0    I run out of air when I cough. 0    My coughing problem affects my voice.   1     My coughing problem limits my physical activity 0     My coughing problem upsets me   1    People ask me if I am sick because I cough a lot. 0        CSI 3            Review of Systems   Constitutional: Negative.    Eyes: Negative.    Respiratory:  Negative for snoring, cough, sputum production, shortness of breath, wheezing, use of rescue inhaler and somnolence.    Cardiovascular: Negative.    Genitourinary: Negative.    Endocrine: endocrine negative    Musculoskeletal: Negative.    Skin: Negative.    Gastrointestinal: Negative.    Psychiatric/Behavioral: Negative.     All other systems reviewed and are negative.      Outpatient Encounter Medications as of 4/1/2024   Medication Sig Dispense Refill    albuterol (PROVENTIL/VENTOLIN HFA) 90 mcg/actuation inhaler INHALE 2 PUFFS INTO THE LUNGS EVERY 4 HOURS AS NEEDED FOR WHEEZING OR SHORTNESS OF BREATH. 18 g 1    amitriptyline (ELAVIL) 100 MG tablet Take 1 tablet (100 mg total) by mouth every evening. 90 tablet 1    blood glucose control, high (TRUE METRIX LEVEL 3) Soln 1 each by Other route once daily. 1 each 5    blood sugar diagnostic (ACCU-CHEK GUIDE TEST STRIPS) Strp USE TO TEST TWICE A  strip 3    blood-glucose meter Misc 1 each by Misc.(Non-Drug; Combo Route) route 2 (two) times a day. 1 each 1    cholecalciferol, vitamin D3, (VITAMIN D3) 25 mcg (1,000 unit) capsule Take 1,000 Units by mouth once daily.      eszopiclone (LUNESTA) 3 mg Tab Take 1 tablet (3 mg total) by mouth every evening. 30 tablet 2    gabapentin (NEURONTIN) 100 MG capsule Take 1 capsule (100 mg total) by mouth every evening. 30 capsule 0     LORazepam (ATIVAN) 1 MG tablet Take 1 tablet (1 mg total) by mouth 2 (two) times daily. 60 tablet 2    losartan (COZAAR) 25 MG tablet TAKE 1 TABLET BY MOUTH ONCE  DAILY 90 tablet 3    methocarbamoL (ROBAXIN) 500 MG Tab Take 1 tablet (500 mg total) by mouth 3 (three) times daily. for 10 days 30 tablet 0    metoprolol succinate (TOPROL-XL) 50 MG 24 hr tablet TAKE 1 TABLET BY MOUTH ONCE  DAILY 90 tablet 2    omeprazole (PRILOSEC) 20 MG capsule TAKE 1 CAPSULE BY MOUTH ONCE  DAILY 90 capsule 2    semaglutide (OZEMPIC) 2 mg/dose (8 mg/3 mL) PnIj Inject 2 mg into the skin every 7 days. 3 mL 2    TRUEPLUS LANCETS 33 gauge Misc TEST BLOOD SUGAR ONE TIME DAILY 100 each 3    lancing device with lancets (ACCU-CHEK SOFT DEV LANCETS) Kit 1 each by Misc.(Non-Drug; Combo Route) route 2 (two) times a day. 1 each 4    [DISCONTINUED] HYDROcodone-acetaminophen (NORCO) 5-325 mg per tablet Take 1 tablet by mouth every 6 (six) hours as needed for Pain. 15 tablet 0    [DISCONTINUED] losartan (COZAAR) 25 MG tablet TAKE 1 TABLET BY MOUTH ONCE  DAILY 90 tablet 2    [DISCONTINUED] oxyCODONE-acetaminophen (PERCOCET)  mg per tablet Take 1 tablet by mouth every 6 (six) hours as needed for Pain. 28 tablet 0    [DISCONTINUED] RSVPreF3 antigen-AS01E, PF, (AREXVY, PF,) 120 mcg/0.5 mL SusR vaccine Inject into the muscle. 0.5 mL 0     Facility-Administered Encounter Medications as of 4/1/2024   Medication Dose Route Frequency Provider Last Rate Last Admin    chlorhexidine 0.12 % solution 10 mL  10 mL Mouth/Throat On Call Procedure Falguni Lindquist PA-C        lactated ringers infusion   Intravenous On Call Procedure Dayanna Jones PA   New Bag at 11/20/20 0912    nozaseptin (NOZIN) nasal    Each Nostril On Call Procedure Dayanna Jones PA   Given at 11/20/20 0825       The following portions of the patient's history were reviewed and updated as appropriate: She  has a past medical history of Arthritis, Bilateral bunions,  Borderline glaucoma, De Quervain's disease (radial styloid tenosynovitis), Gastritis, Hydradenitis, Hyperlipidemia, Hypertension, Insomnia, Migraines (2000), Nasal septum perforation, Obesity, Pneumonia, Restrictive airway disease, Sleep apnea, SVT (supraventricular tachycardia) (2013), Trigger finger, and Type 2 diabetes mellitus ().  She does not have any pertinent problems on file.  She  has a past surgical history that includes Cholecystectomy (2014); Carpal tunnel release; Axillary hidradenitis excision (Bilateral); Trigger finger release (Right, 2015); Cyst Removal (2015); TONSILLECTOMY, ADENOIDECTOMY (1980s);  section (); gastric sleeve (2017); Knee surgery (Right); Cataract extraction (Bilateral); Breast biopsy (Bilateral); Olecranon bursectomy (Right, 2018); Bone exostosis excision (Right, 2018); Surgical removal of bunion with osteotomy of metatarsal bone (Left, 05/10/2019); Surgical removal of bunion with osteotomy of metatarsal bone (Right, 2019); De Quervain's release (Left, 2020); Colonoscopy w/ polypectomy (10/02/2020); Colonoscopy (N/A, 10/02/2020); De Quervain's release (Right, 2020); Eye surgery; Breast surgery (1998); Carpal tunnel release (Right, 2024); and Carpal tunnel release (Left, 3/8/2024).  Her family history includes Diabetes in her cousin and maternal aunt; Hypertension in her maternal grandmother; Prostate cancer in her brother.  She  reports that she quit smoking about 12 years ago. Her smoking use included cigarettes. She started smoking about 54 years ago. She has a 21.0 pack-year smoking history. She has been exposed to tobacco smoke. She has never used smokeless tobacco. She reports that she does not currently use alcohol after a past usage of about 1.0 standard drink of alcohol per week. She reports that she does not use drugs.  She has a current medication list which includes the following  prescription(s): albuterol, amitriptyline, true metrix level 3, blood sugar diagnostic, blood-glucose meter, cholecalciferol (vitamin d3), eszopiclone, gabapentin, lorazepam, losartan, methocarbamol, metoprolol succinate, omeprazole, ozempic, trueplus lancets, and lancing device with lancets, and the following Facility-Administered Medications: chlorhexidine, lactated ringers, and nozaseptin.  Current Outpatient Medications on File Prior to Visit   Medication Sig Dispense Refill    albuterol (PROVENTIL/VENTOLIN HFA) 90 mcg/actuation inhaler INHALE 2 PUFFS INTO THE LUNGS EVERY 4 HOURS AS NEEDED FOR WHEEZING OR SHORTNESS OF BREATH. 18 g 1    amitriptyline (ELAVIL) 100 MG tablet Take 1 tablet (100 mg total) by mouth every evening. 90 tablet 1    blood glucose control, high (TRUE METRIX LEVEL 3) Soln 1 each by Other route once daily. 1 each 5    blood sugar diagnostic (ACCU-CHEK GUIDE TEST STRIPS) Strp USE TO TEST TWICE A  strip 3    blood-glucose meter Misc 1 each by Misc.(Non-Drug; Combo Route) route 2 (two) times a day. 1 each 1    cholecalciferol, vitamin D3, (VITAMIN D3) 25 mcg (1,000 unit) capsule Take 1,000 Units by mouth once daily.      eszopiclone (LUNESTA) 3 mg Tab Take 1 tablet (3 mg total) by mouth every evening. 30 tablet 2    gabapentin (NEURONTIN) 100 MG capsule Take 1 capsule (100 mg total) by mouth every evening. 30 capsule 0    LORazepam (ATIVAN) 1 MG tablet Take 1 tablet (1 mg total) by mouth 2 (two) times daily. 60 tablet 2    losartan (COZAAR) 25 MG tablet TAKE 1 TABLET BY MOUTH ONCE  DAILY 90 tablet 3    methocarbamoL (ROBAXIN) 500 MG Tab Take 1 tablet (500 mg total) by mouth 3 (three) times daily. for 10 days 30 tablet 0    metoprolol succinate (TOPROL-XL) 50 MG 24 hr tablet TAKE 1 TABLET BY MOUTH ONCE  DAILY 90 tablet 2    omeprazole (PRILOSEC) 20 MG capsule TAKE 1 CAPSULE BY MOUTH ONCE  DAILY 90 capsule 2    semaglutide (OZEMPIC) 2 mg/dose (8 mg/3 mL) PnIj Inject 2 mg into the skin every  "7 days. 3 mL 2    TRUEPLUS LANCETS 33 gauge Misc TEST BLOOD SUGAR ONE TIME DAILY 100 each 3    lancing device with lancets (ACCU-CHEK SOFT DEV LANCETS) Kit 1 each by Misc.(Non-Drug; Combo Route) route 2 (two) times a day. 1 each 4    [DISCONTINUED] HYDROcodone-acetaminophen (NORCO) 5-325 mg per tablet Take 1 tablet by mouth every 6 (six) hours as needed for Pain. 15 tablet 0    [DISCONTINUED] oxyCODONE-acetaminophen (PERCOCET)  mg per tablet Take 1 tablet by mouth every 6 (six) hours as needed for Pain. 28 tablet 0    [DISCONTINUED] RSVPreF3 antigen-AS01E, PF, (AREXVY, PF,) 120 mcg/0.5 mL SusR vaccine Inject into the muscle. 0.5 mL 0     Current Facility-Administered Medications on File Prior to Visit   Medication Dose Route Frequency Provider Last Rate Last Admin    chlorhexidine 0.12 % solution 10 mL  10 mL Mouth/Throat On Call Procedure Falguni Lindquist PA-C        lactated ringers infusion   Intravenous On Call Procedure Dayanna Jones PA   New Bag at 11/20/20 0912    nozaseptin (NOZIN) nasal    Each Nostril On Call Procedure Dayanna Jones PA   Given at 11/20/20 0825     She has No Known Allergies..      BP Readings from Last 3 Encounters:   04/01/24 131/77   03/22/24 123/89   03/18/24 122/80     Snoring / Sleep:            MMRC Dyspnea Scale (4 is worst)     [x] MMRC 0: Dyspneic on strenuous excercise (0 points)    [] MMRC 1: Dyspneic on walking a slight hill (0 points)    [] MMRC 2: Dyspneic on walking level ground; must stop occasionally due to breathlessness (1 point)    [] MMRC 3: Must stop for breathlessness after walking 100 yards or after a few minutes (2 points)    [] MMRC 4: Cannot leave house; breathless on dressing/undressing (3 points)                          No data to display                          Objective:     Vital Signs (Most Recent)  Vital Signs  Pulse: 105  Resp: 16  SpO2: 98 %  BP: 131/77  Height and Weight  Height: 5' 3" (160 cm)  Weight: 77.6 kg (171 lb 1.2 " oz)  BSA (Calculated - sq m): 1.86 sq meters  BMI (Calculated): 30.3  Weight in (lb) to have BMI = 25: 140.8]  Wt Readings from Last 2 Encounters:   04/01/24 77.6 kg (170 lb 15.8 oz)   04/01/24 77.6 kg (171 lb 1.2 oz)       Physical Exam  Vitals and nursing note reviewed.   Constitutional:       Appearance: She is normal weight.   HENT:      Head: Normocephalic and atraumatic.      Nose: Nose normal.   Eyes:      Pupils: Pupils are equal, round, and reactive to light.   Cardiovascular:      Rate and Rhythm: Normal rate and regular rhythm.      Pulses: Normal pulses.      Heart sounds: Normal heart sounds.   Pulmonary:      Effort: Pulmonary effort is normal.      Breath sounds: Normal breath sounds.   Abdominal:      General: Bowel sounds are normal.      Palpations: Abdomen is soft.   Musculoskeletal:      Cervical back: Normal range of motion.   Skin:     General: Skin is warm.   Neurological:      General: No focal deficit present.      Mental Status: She is alert and oriented to person, place, and time.   Psychiatric:         Mood and Affect: Mood normal.          Laboratory  Lab Results   Component Value Date    WBC 7.12 02/01/2024    RBC 5.50 (H) 02/01/2024    HGB 13.9 02/01/2024    HCT 41.8 02/01/2024    MCV 76 (L) 02/01/2024    MCH 25.3 (L) 02/01/2024    MCHC 33.3 02/01/2024    RDW 15.0 (H) 02/01/2024     02/01/2024    MPV 9.8 02/01/2024    GRAN 3.7 02/01/2024    GRAN 52.6 02/01/2024    LYMPH 2.6 02/01/2024    LYMPH 35.8 02/01/2024    MONO 0.7 02/01/2024    MONO 9.7 02/01/2024    EOS 0.1 02/01/2024    BASO 0.03 02/01/2024    EOSINOPHIL 1.4 02/01/2024    BASOPHIL 0.4 02/01/2024       BMP  Lab Results   Component Value Date     02/01/2024    K 4.1 02/01/2024     02/01/2024    CO2 26 02/01/2024    BUN 7 (L) 02/01/2024    CREATININE 0.7 02/01/2024    CALCIUM 9.7 02/01/2024    ANIONGAP 7 (L) 02/01/2024    ESTGFRAFRICA >60 06/21/2022    EGFRNONAA >60 06/21/2022    AST 26 12/27/2023    ALT 58 (H)  "12/27/2023    PROT 8.3 12/27/2023          No results found for: "IGE"     No results found for: "ASPERGILLUS"  No results found for: "AFUMIGATUSCL"     No results found for: "ACE"     Diagnostic Results:  I have personally reviewed today the following studies:    X-Ray Lumbar Spine AP And Lateral  Narrative: EXAM: XR LUMBAR SPINE AP AND LATERAL, 3 images    CLINICAL INDICATION:    Low back pain    FINDINGS:  No comparison studies are available. There are 5 weight bearing lumbar vertebra. Mild L5/S1 disc height reduction.  Multilevel marginal spondylosis.  Marked lower lumbar spine degenerative facet arthropathy. The vertebral body heights and intervertebral disc heights are otherwise well-maintained. Negative for     spondylolysis or spondylolisthesis.  Posterior elements are intact.    The sacral ala and sacroiliac joints are intact. There is sclerosis of the sacroiliac joints.    The bowel gas pattern is normal. Vascular calcifications are present without aneurysmal change. Cholecystectomy clips.  Postoperative changes in the left upper quadrant.  Impression: 1.  Negative for acute process involving the lumbar spine.  2.  Degenerative disc changes and degenerative facet arthropathy most significantly involving L5/S1.  3.  Incidental findings as noted above.    Finalized on: 3/22/2024 12:18 PM By:  Delfino Cox MD  [facility]# [id number]      2024-03-22 12:20:10.726    [facility]  X-ray Knee Ortho Bilateral with Flexion  Narrative: EXAM: XR KNEE ORTHO BILAT WITH FLEXION    CLINICAL INDICATION:   Pain in right knee.  Pain in left knee.    FINDINGS:  No comparison studies are available.  AP standing views, AP flexion views, sunrise views and lateral views of the bilateral knees were submitted for interpretation.  Negative for right or left knee joint effusion.    Alignment is satisfactory. No     fractures, dislocations, or erosive arthritic change.  Negative for radiopaque foreign bodies or air in the soft tissues.  Mild " "bilateral medial tibiofemoral compartment joint space narrowing.  Joint spaces otherwise well-maintained with mild tricompartment osteophyte formation.  Vascular calcifications.  Impression: 1.  Negative for acute process involving the right or left knee.  2.  Mild tricompartment degenerative changes most significantly involving the bilateral medial tibiofemoral compartments.    Finalized on: 3/22/2024 11:09 AM By:  Delfino Cox MD  [facility]# [id number]      2024-03-22 11:11:15.952    [facility]  X-Ray Hips Bilateral 2 View Incl AP Pelvis  Narrative: EXAM: XR HIPS BILATERAL 2 VIEW INCL AP PELVIS    CLINICAL INDICATION:   Pelvis and perineal pain.  Pain in right hip.  Pain in left hip.    FINDINGS:  No comparison studies are available.  5 total views of the pelvis and bilateral hips were submitted for interpretation.  Left greater than right axial hip joint space narrowing.  Moderate enthesopathic changes involve the pelvis.  Moderate degenerative changes of the lower lumbar spine.    Alignment is satisfactory. No     fractures, dislocations, or erosive arthritic change.  Negative for radiopaque foreign bodies or air in the soft tissues.    Normal bowel gas pattern.  Phleboliths versus ureteroliths are present within the pelvis.  Vascular calcifications.  Impression: 1.  Negative for acute process involving the visualized osseous structures.  2.  Age-appropriate degenerative changes as detailed above.    Finalized on: 3/22/2024 10:05 AM By:  Delfino Cox MD  [facility]# [id number]      2024-03-22 10:08:02.240    [facility]         Recent Labs     04/01/24  1059   PH 7.438   PCO2 41.4   PO2 82   HCO3 27.9   POCSATURATED 96   BE 4*        Ordering Provider: Dr. Robin          Interpreting Provider: Dr. Robin  Performing nurse/tech/RT: ROSAURA Manuel, RRT  Diagnosis:  (pulmonary nodule)  Height: 5' 3" (160 cm)  Weight: 77.6 kg (170 lb 15.8 oz)  BMI (Calculated): 30.3              Patient Race:                                      "                            Phase Oxygen Assessment Supplemental O2 Heart   Rate Blood Pressure Pollo Dyspnea Scale Rating   Resting 96 % Room Air 109 bpm 116/74 0   Exercise             Minute             1 95 % Room Air 120 bpm       2 95 % Room Air 120 bpm       3 95 % Room Air 121 bpm       4 97 % Room Air 121 bpm       5 96 % Room Air 122 bpm       6  96 % Room Air 120 bpm 132/74 2   Recovery             Minute             1 99 % Room Air 118 bpm       2 99 % Room Air 108 bpm       3 99 % Room Air 107 bpm       4 98 % Room Air 105 bpm 131/77 0      Six Minute Walk Summary  6MWT Status: completed without stopping  Patient Reported: Dyspnea         Interpretation:  Did the patient stop or pause?: No  Total Time Walked (Calculated): 360 seconds  Final Partial Lap Distance (feet): 150 feet  Total Distance Meters (Calculated): 289.56 meters  Predicted Distance Meters (Calculated): 399.27 meters  Percentage of Predicted (Calculated): 72.52  Peak VO2 (Calculated): 12.67  Mets: 3.62  Has The Patient Had a Previous Six Minute Walk Test?: No     Previous 6MWT Results  Has The Patient Had a Previous Six Minute Walk Test?: No      PFT  FEV1: 1.39L( 80.8%), FVC 1.39L( 80.4%), FEV1/FVC 77  TLC 3.27L(68.6%)  DLCo 13.93( 69.4%)                Assessment/Plan:     Problem List Items Addressed This Visit       Hyperlipidemia associated with type 2 diabetes mellitus (Chronic)    Calcified granuloma of lung    BMI 31.0-31.9,adult    Controlled type 2 diabetes mellitus without complication, without long-term current use of insulin    Mild intermittent asthma    Solitary pulmonary nodule    Relevant Orders    Case Request Endoscopy: ENDOBRONCHIAL ULTRASOUND (EBUS) (Completed)    Ambulatory referral/consult to Cardiology    Hilar adenopathy - Primary     EBUS for station 10 L for mediastinal staging         Relevant Orders    Case Request Endoscopy: ENDOBRONCHIAL ULTRASOUND (EBUS) (Completed)    Ambulatory referral/consult to  Cardiology    CBC auto differential    Comprehensive Metabolic Panel        Would be surgical candidate if mediastinal staging -ve  CT biopsy on 04/04 ( lieu of Robotic due to technical issues) then ediastinal staging next week      My diagnostic impression and work-up plan were discussed at length with patient. Risks were discussed. EBUS procedure. Complications of the procedure discussed in detail with patient. Complications including but not limited to infection that may require hospital admission, bleeding that may require blood transfusion and or hospital admission, perforation of the lung which may require surgery,chance of injury to the throat, windpipe or bronchial tubes, laryngospam, coughing, aspiration, hypoxemia or cardiac arrythmias. Patient expressed and verbalized understanding. The material risks of anesthesia in connection with the procedure including brain damage, paralysis from the neck downwards, paralysis from the waist downwards, loss of function of an arm or a leg, disfigurement (incluing scars)and death were discussed with patient who expressedand verbalized understanding. Alternate treatments and material risks associated with such alternatives were discussed with pateint. These include radiologic surveillance with minimal risk and sugery with an indeterminate risk. The material risks of refusing the procedure was discussed in detail. This includes no diagnosis or confirmation of diagnisis and rendering of appropriate treatment the risk of which depends on the nature of the diagnosed illness. Patient expressed and verbalized understanding. Procedure scheduled for date 04/10/2024. Consent signed. Orders entered. Coagulation studies per orders.   All questions were answered and the patient expressed understanding      Follow up in about 4 weeks (around 4/29/2024), or Ebus consent, ref cardio, Labs today.    This note was prepared using voice recognition system and is likely to have sound alike  errors that may have been overlooked even after proof reading.  Please call me with any questions    Discussed diagnosis, its evaluation, treatment and usual course. All questions answered.    Thank you for the courtesy of participating in the care of this patient    Adrian Robin MD      Personal Diagnostic Review  []  CXR    []  ECHO    []  ONSAT    []  6MWD    []  LABS    []  CHEST CT    []  PET CT    []  Biopsy results

## 2024-04-01 NOTE — PROCEDURES
"[facility] - Pulmonary Function  Six Minute Walk     SUMMARY     Ordering Provider: Dr. Robin   Interpreting Provider: Dr. Robin  Performing nurse/tech/RT: ROSAURA Manuel, RRT  Diagnosis:  (pulmonary nodule)  Height: 5' 3" (160 cm)  Weight: 77.6 kg (170 lb 15.8 oz)  BMI (Calculated): 30.3   Patient Race:             Phase Oxygen Assessment Supplemental O2 Heart   Rate Blood Pressure [place] Dyspnea Scale Rating   Resting 96 % Room Air 109 bpm 116/74 0   Exercise        Minute        1 95 % Room Air 120 bpm     2 95 % Room Air 120 bpm     3 95 % Room Air 121 bpm     4 97 % Room Air 121 bpm     5 96 % Room Air 122 bpm     6  96 % Room Air 120 bpm 132/74 2   Recovery        Minute        1 99 % Room Air 118 bpm     2 99 % Room Air 108 bpm     3 99 % Room Air 107 bpm     4 98 % Room Air 105 bpm 131/77 0     Six Minute Walk Summary  6MWT Status: completed without stopping  Patient Reported: Dyspnea     Interpretation:  Did the patient stop or pause?: No            Total Time Walked (Calculated): 360 seconds  Final Partial Lap Distance (feet): 150 feet  Total Distance Meters (Calculated): 289.56 meters  Predicted Distance Meters (Calculated): 399.27 meters  Percentage of Predicted (Calculated): 72.52  Peak VO2 (Calculated): 12.67  Mets: 3.62  Has The Patient Had a Previous Six Minute Walk Test?: No       Previous 6MWT Results  Has The Patient Had a Previous Six Minute Walk Test?: No           CLINICAL INTERPRETATION:  Six minute walk distance is 289.56m (72.52 % predicted) with very light dyspnea.  During exercise, there was no significant desaturation while breathing room air.  Blood pressure remained stable and Heart rate increased significantly with walking.  The patient did not report non-pulmonary symptoms during exercise.  The patient did complete the study, walking 360 seconds of the 360 second test.  Based upon age and body mass index, exercise capacity is normal.      [x] Mild exercise-induced " hypoxemia described as an arterial oxygen saturation of 93-95% (or 3-4% less than at rest)  []  Moderate exercise-induced hypoxemia as 89-93%  []  Severe exercise induced hypoxemia as < 89% O2 saturation.  Medicare Criteria for oxygen prescription comments:   []  When arterial oxygen saturation is at or below 88% during exercise (severe exercise induced hypoxemia) then the patient falls under Medicare Group 1 criteria for supplemental oxygen

## 2024-04-01 NOTE — H&P (VIEW-ONLY)
"                                           Pulmonary Outpatient  Visit     Subjective:       Patient ID: Cassandra Campos is a 73 y.o. female.    Social History     Tobacco Use   Smoking Status Former    Current packs/day: 0.00    Average packs/day: 0.5 packs/day for 42.0 years (21.0 ttl pk-yrs)    Types: Cigarettes    Start date: 1970    Quit date: 2012    Years since quittin.2    Passive exposure: Past   Smokeless Tobacco Never            Chief Complaint: Follow-up (2 week// rev abg,walk,PFT)          Cassandra Campos is 73 y.o.  Asked to see by Dr Ball  Abn imaging CT abdomen   No cough no wheezing no shortness of breath   Last seen in this department in to any to any   History of asthma on inhaler   Former smoker quit 20 years ago   Retired teacher   No past no history of cancer   No family history of cancer   Sinus congestion - hole in nose from snorting cocaine  Smoked 1ppd for many years - not smoking now, 15 pack years  Denies weight loss hemoptysis no TB exposure       Imaging reviewed with patient          2024  Followup  Revewed PFT  ABG  Walk  CT biopsy for   Will do EBUS on 04/10  Will need cardiology clearance          Solitary Pulmonary Nodule (SPN) Malignancy Risk Score ([facility] Model) from [email or web address]  on 3/18/2024  ** All calculations should be rechecked by clinician prior to use **    RESULT SUMMARY:  95.7 %  Probability of malignancy      One study suggests watchful waiting only at very low post-test probabilities (<2%), biopsy at "lower" post-test probabilities (2% to 20%), and surgery at higher post-test probabilities (>70%). See Next Steps.      INPUTS:  Age --> 73 years  Nodule diameter --> 23 mm  Current or former smoker --> 1 = Yes  Extrathoracic cancer diagnosis ?5 years prior --> 0 = No  Upper lobe location of tumor --> 1 = Yes  Nodule spiculation --> 1 = Yes  FDG-PET --> 4 = Intense uptake    Situation Frequency of Problem   My cough is worse when I " lie down   1    My coughing problem causes me to restrict my personal and social life. 0     I tend to avoid places because of my cough problem. 0     I feel embarrassed because of my coughing problem. 0    People ask, Whats wrong? Because I cough a lot. 0    I run out of air when I cough. 0    My coughing problem affects my voice.   1     My coughing problem limits my physical activity 0     My coughing problem upsets me   1    People ask me if I am sick because I cough a lot. 0        CSI 3            Review of Systems   Constitutional: Negative.    Eyes: Negative.    Respiratory:  Negative for snoring, cough, sputum production, shortness of breath, wheezing, use of rescue inhaler and somnolence.    Cardiovascular: Negative.    Genitourinary: Negative.    Endocrine: endocrine negative    Musculoskeletal: Negative.    Skin: Negative.    Gastrointestinal: Negative.    Psychiatric/Behavioral: Negative.     All other systems reviewed and are negative.      Outpatient Encounter Medications as of 4/1/2024   Medication Sig Dispense Refill    albuterol (PROVENTIL/VENTOLIN HFA) 90 mcg/actuation inhaler INHALE 2 PUFFS INTO THE LUNGS EVERY 4 HOURS AS NEEDED FOR WHEEZING OR SHORTNESS OF BREATH. 18 g 1    amitriptyline (ELAVIL) 100 MG tablet Take 1 tablet (100 mg total) by mouth every evening. 90 tablet 1    blood glucose control, high (TRUE METRIX LEVEL 3) Soln 1 each by Other route once daily. 1 each 5    blood sugar diagnostic (ACCU-CHEK GUIDE TEST STRIPS) Strp USE TO TEST TWICE A  strip 3    blood-glucose meter Misc 1 each by Misc.(Non-Drug; Combo Route) route 2 (two) times a day. 1 each 1    cholecalciferol, vitamin D3, (VITAMIN D3) 25 mcg (1,000 unit) capsule Take 1,000 Units by mouth once daily.      eszopiclone (LUNESTA) 3 mg Tab Take 1 tablet (3 mg total) by mouth every evening. 30 tablet 2    gabapentin (NEURONTIN) 100 MG capsule Take 1 capsule (100 mg total) by mouth every evening. 30 capsule 0     LORazepam (ATIVAN) 1 MG tablet Take 1 tablet (1 mg total) by mouth 2 (two) times daily. 60 tablet 2    losartan (COZAAR) 25 MG tablet TAKE 1 TABLET BY MOUTH ONCE  DAILY 90 tablet 3    methocarbamoL (ROBAXIN) 500 MG Tab Take 1 tablet (500 mg total) by mouth 3 (three) times daily. for 10 days 30 tablet 0    metoprolol succinate (TOPROL-XL) 50 MG 24 hr tablet TAKE 1 TABLET BY MOUTH ONCE  DAILY 90 tablet 2    omeprazole (PRILOSEC) 20 MG capsule TAKE 1 CAPSULE BY MOUTH ONCE  DAILY 90 capsule 2    semaglutide (OZEMPIC) 2 mg/dose (8 mg/3 mL) PnIj Inject 2 mg into the skin every 7 days. 3 mL 2    TRUEPLUS LANCETS 33 gauge Misc TEST BLOOD SUGAR ONE TIME DAILY 100 each 3    lancing device with lancets (ACCU-CHEK SOFT DEV LANCETS) Kit 1 each by Misc.(Non-Drug; Combo Route) route 2 (two) times a day. 1 each 4    [DISCONTINUED] HYDROcodone-acetaminophen (NORCO) 5-325 mg per tablet Take 1 tablet by mouth every 6 (six) hours as needed for Pain. 15 tablet 0    [DISCONTINUED] losartan (COZAAR) 25 MG tablet TAKE 1 TABLET BY MOUTH ONCE  DAILY 90 tablet 2    [DISCONTINUED] oxyCODONE-acetaminophen (PERCOCET)  mg per tablet Take 1 tablet by mouth every 6 (six) hours as needed for Pain. 28 tablet 0    [DISCONTINUED] RSVPreF3 antigen-AS01E, PF, (AREXVY, PF,) 120 mcg/0.5 mL SusR vaccine Inject into the muscle. 0.5 mL 0     Facility-Administered Encounter Medications as of 4/1/2024   Medication Dose Route Frequency Provider Last Rate Last Admin    chlorhexidine 0.12 % solution 10 mL  10 mL Mouth/Throat On Call Procedure Falguni Lindquist PA-C        lactated ringers infusion   Intravenous On Call Procedure Dayanna Jones PA   New Bag at 11/20/20 0912    nozaseptin (NOZIN) nasal    Each Nostril On Call Procedure Dayanna Jones PA   Given at 11/20/20 0825       The following portions of the patient's history were reviewed and updated as appropriate: She  has a past medical history of Arthritis, Bilateral bunions,  Borderline glaucoma, De Quervain's disease (radial styloid tenosynovitis), Gastritis, Hydradenitis, Hyperlipidemia, Hypertension, Insomnia, Migraines (2000), Nasal septum perforation, Obesity, Pneumonia, Restrictive airway disease, Sleep apnea, SVT (supraventricular tachycardia) (2013), Trigger finger, and Type 2 diabetes mellitus ().  She does not have any pertinent problems on file.  She  has a past surgical history that includes Cholecystectomy (2014); Carpal tunnel release; Axillary hidradenitis excision (Bilateral); Trigger finger release (Right, 2015); Cyst Removal (2015); TONSILLECTOMY, ADENOIDECTOMY (1980s);  section (); gastric sleeve (2017); Knee surgery (Right); Cataract extraction (Bilateral); Breast biopsy (Bilateral); Olecranon bursectomy (Right, 2018); Bone exostosis excision (Right, 2018); Surgical removal of bunion with osteotomy of metatarsal bone (Left, 05/10/2019); Surgical removal of bunion with osteotomy of metatarsal bone (Right, 2019); De Quervain's release (Left, 2020); Colonoscopy w/ polypectomy (10/02/2020); Colonoscopy (N/A, 10/02/2020); De Quervain's release (Right, 2020); Eye surgery; Breast surgery (1998); Carpal tunnel release (Right, 2024); and Carpal tunnel release (Left, 3/8/2024).  Her family history includes Diabetes in her cousin and maternal aunt; Hypertension in her maternal grandmother; Prostate cancer in her brother.  She  reports that she quit smoking about 12 years ago. Her smoking use included cigarettes. She started smoking about 54 years ago. She has a 21.0 pack-year smoking history. She has been exposed to tobacco smoke. She has never used smokeless tobacco. She reports that she does not currently use alcohol after a past usage of about 1.0 standard drink of alcohol per week. She reports that she does not use drugs.  She has a current medication list which includes the following  prescription(s): albuterol, amitriptyline, true metrix level 3, blood sugar diagnostic, blood-glucose meter, cholecalciferol (vitamin d3), eszopiclone, gabapentin, lorazepam, losartan, methocarbamol, metoprolol succinate, omeprazole, ozempic, trueplus lancets, and lancing device with lancets, and the following Facility-Administered Medications: chlorhexidine, lactated ringers, and nozaseptin.  Current Outpatient Medications on File Prior to Visit   Medication Sig Dispense Refill    albuterol (PROVENTIL/VENTOLIN HFA) 90 mcg/actuation inhaler INHALE 2 PUFFS INTO THE LUNGS EVERY 4 HOURS AS NEEDED FOR WHEEZING OR SHORTNESS OF BREATH. 18 g 1    amitriptyline (ELAVIL) 100 MG tablet Take 1 tablet (100 mg total) by mouth every evening. 90 tablet 1    blood glucose control, high (TRUE METRIX LEVEL 3) Soln 1 each by Other route once daily. 1 each 5    blood sugar diagnostic (ACCU-CHEK GUIDE TEST STRIPS) Strp USE TO TEST TWICE A  strip 3    blood-glucose meter Misc 1 each by Misc.(Non-Drug; Combo Route) route 2 (two) times a day. 1 each 1    cholecalciferol, vitamin D3, (VITAMIN D3) 25 mcg (1,000 unit) capsule Take 1,000 Units by mouth once daily.      eszopiclone (LUNESTA) 3 mg Tab Take 1 tablet (3 mg total) by mouth every evening. 30 tablet 2    gabapentin (NEURONTIN) 100 MG capsule Take 1 capsule (100 mg total) by mouth every evening. 30 capsule 0    LORazepam (ATIVAN) 1 MG tablet Take 1 tablet (1 mg total) by mouth 2 (two) times daily. 60 tablet 2    losartan (COZAAR) 25 MG tablet TAKE 1 TABLET BY MOUTH ONCE  DAILY 90 tablet 3    methocarbamoL (ROBAXIN) 500 MG Tab Take 1 tablet (500 mg total) by mouth 3 (three) times daily. for 10 days 30 tablet 0    metoprolol succinate (TOPROL-XL) 50 MG 24 hr tablet TAKE 1 TABLET BY MOUTH ONCE  DAILY 90 tablet 2    omeprazole (PRILOSEC) 20 MG capsule TAKE 1 CAPSULE BY MOUTH ONCE  DAILY 90 capsule 2    semaglutide (OZEMPIC) 2 mg/dose (8 mg/3 mL) PnIj Inject 2 mg into the skin every  "7 days. 3 mL 2    TRUEPLUS LANCETS 33 gauge Misc TEST BLOOD SUGAR ONE TIME DAILY 100 each 3    lancing device with lancets (ACCU-CHEK SOFT DEV LANCETS) Kit 1 each by Misc.(Non-Drug; Combo Route) route 2 (two) times a day. 1 each 4    [DISCONTINUED] HYDROcodone-acetaminophen (NORCO) 5-325 mg per tablet Take 1 tablet by mouth every 6 (six) hours as needed for Pain. 15 tablet 0    [DISCONTINUED] oxyCODONE-acetaminophen (PERCOCET)  mg per tablet Take 1 tablet by mouth every 6 (six) hours as needed for Pain. 28 tablet 0    [DISCONTINUED] RSVPreF3 antigen-AS01E, PF, (AREXVY, PF,) 120 mcg/0.5 mL SusR vaccine Inject into the muscle. 0.5 mL 0     Current Facility-Administered Medications on File Prior to Visit   Medication Dose Route Frequency Provider Last Rate Last Admin    chlorhexidine 0.12 % solution 10 mL  10 mL Mouth/Throat On Call Procedure Falguni Lindquist PA-C        lactated ringers infusion   Intravenous On Call Procedure Dayanna Jones PA   New Bag at 11/20/20 0912    nozaseptin (NOZIN) nasal    Each Nostril On Call Procedure Dayanna Jones PA   Given at 11/20/20 0825     She has No Known Allergies..      BP Readings from Last 3 Encounters:   04/01/24 131/77   03/22/24 123/89   03/18/24 122/80     Snoring / Sleep:            MMRC Dyspnea Scale (4 is worst)     [x] MMRC 0: Dyspneic on strenuous excercise (0 points)    [] MMRC 1: Dyspneic on walking a slight hill (0 points)    [] MMRC 2: Dyspneic on walking level ground; must stop occasionally due to breathlessness (1 point)    [] MMRC 3: Must stop for breathlessness after walking 100 yards or after a few minutes (2 points)    [] MMRC 4: Cannot leave house; breathless on dressing/undressing (3 points)                          No data to display                          Objective:     Vital Signs (Most Recent)  Vital Signs  Pulse: 105  Resp: 16  SpO2: 98 %  BP: 131/77  Height and Weight  Height: 5' 3" (160 cm)  Weight: 77.6 kg (171 lb 1.2 " oz)  BSA (Calculated - sq m): 1.86 sq meters  BMI (Calculated): 30.3  Weight in (lb) to have BMI = 25: 140.8]  Wt Readings from Last 2 Encounters:   04/01/24 77.6 kg (170 lb 15.8 oz)   04/01/24 77.6 kg (171 lb 1.2 oz)       Physical Exam  Vitals and nursing note reviewed.   Constitutional:       Appearance: She is normal weight.   HENT:      Head: Normocephalic and atraumatic.      Nose: Nose normal.   Eyes:      Pupils: Pupils are equal, round, and reactive to light.   Cardiovascular:      Rate and Rhythm: Normal rate and regular rhythm.      Pulses: Normal pulses.      Heart sounds: Normal heart sounds.   Pulmonary:      Effort: Pulmonary effort is normal.      Breath sounds: Normal breath sounds.   Abdominal:      General: Bowel sounds are normal.      Palpations: Abdomen is soft.   Musculoskeletal:      Cervical back: Normal range of motion.   Skin:     General: Skin is warm.   Neurological:      General: No focal deficit present.      Mental Status: She is alert and oriented to person, place, and time.   Psychiatric:         Mood and Affect: Mood normal.          Laboratory  Lab Results   Component Value Date    WBC 7.12 02/01/2024    RBC 5.50 (H) 02/01/2024    HGB 13.9 02/01/2024    HCT 41.8 02/01/2024    MCV 76 (L) 02/01/2024    MCH 25.3 (L) 02/01/2024    MCHC 33.3 02/01/2024    RDW 15.0 (H) 02/01/2024     02/01/2024    MPV 9.8 02/01/2024    GRAN 3.7 02/01/2024    GRAN 52.6 02/01/2024    LYMPH 2.6 02/01/2024    LYMPH 35.8 02/01/2024    MONO 0.7 02/01/2024    MONO 9.7 02/01/2024    EOS 0.1 02/01/2024    BASO 0.03 02/01/2024    EOSINOPHIL 1.4 02/01/2024    BASOPHIL 0.4 02/01/2024       BMP  Lab Results   Component Value Date     02/01/2024    K 4.1 02/01/2024     02/01/2024    CO2 26 02/01/2024    BUN 7 (L) 02/01/2024    CREATININE 0.7 02/01/2024    CALCIUM 9.7 02/01/2024    ANIONGAP 7 (L) 02/01/2024    ESTGFRAFRICA >60 06/21/2022    EGFRNONAA >60 06/21/2022    AST 26 12/27/2023    ALT 58 (H)  "12/27/2023    PROT 8.3 12/27/2023          No results found for: "IGE"     No results found for: "ASPERGILLUS"  No results found for: "AFUMIGATUSCL"     No results found for: "ACE"     Diagnostic Results:  I have personally reviewed today the following studies:    X-Ray Lumbar Spine AP And Lateral  Narrative: EXAM: XR LUMBAR SPINE AP AND LATERAL, 3 images    CLINICAL INDICATION:    Low back pain    FINDINGS:  No comparison studies are available. There are 5 weight bearing lumbar vertebra. Mild L5/S1 disc height reduction.  Multilevel marginal spondylosis.  Marked lower lumbar spine degenerative facet arthropathy. The vertebral body heights and intervertebral disc heights are otherwise well-maintained. Negative for     spondylolysis or spondylolisthesis.  Posterior elements are intact.    The sacral ala and sacroiliac joints are intact. There is sclerosis of the sacroiliac joints.    The bowel gas pattern is normal. Vascular calcifications are present without aneurysmal change. Cholecystectomy clips.  Postoperative changes in the left upper quadrant.  Impression: 1.  Negative for acute process involving the lumbar spine.  2.  Degenerative disc changes and degenerative facet arthropathy most significantly involving L5/S1.  3.  Incidental findings as noted above.    Finalized on: 3/22/2024 12:18 PM By:  Delfino Cox MD  [facility]# [id number]      2024-03-22 12:20:10.726    [facility]  X-ray Knee Ortho Bilateral with Flexion  Narrative: EXAM: XR KNEE ORTHO BILAT WITH FLEXION    CLINICAL INDICATION:   Pain in right knee.  Pain in left knee.    FINDINGS:  No comparison studies are available.  AP standing views, AP flexion views, sunrise views and lateral views of the bilateral knees were submitted for interpretation.  Negative for right or left knee joint effusion.    Alignment is satisfactory. No     fractures, dislocations, or erosive arthritic change.  Negative for radiopaque foreign bodies or air in the soft tissues.  Mild " "bilateral medial tibiofemoral compartment joint space narrowing.  Joint spaces otherwise well-maintained with mild tricompartment osteophyte formation.  Vascular calcifications.  Impression: 1.  Negative for acute process involving the right or left knee.  2.  Mild tricompartment degenerative changes most significantly involving the bilateral medial tibiofemoral compartments.    Finalized on: 3/22/2024 11:09 AM By:  Delfino Cox MD  [facility]# [id number]      2024-03-22 11:11:15.952    [facility]  X-Ray Hips Bilateral 2 View Incl AP Pelvis  Narrative: EXAM: XR HIPS BILATERAL 2 VIEW INCL AP PELVIS    CLINICAL INDICATION:   Pelvis and perineal pain.  Pain in right hip.  Pain in left hip.    FINDINGS:  No comparison studies are available.  5 total views of the pelvis and bilateral hips were submitted for interpretation.  Left greater than right axial hip joint space narrowing.  Moderate enthesopathic changes involve the pelvis.  Moderate degenerative changes of the lower lumbar spine.    Alignment is satisfactory. No     fractures, dislocations, or erosive arthritic change.  Negative for radiopaque foreign bodies or air in the soft tissues.    Normal bowel gas pattern.  Phleboliths versus ureteroliths are present within the pelvis.  Vascular calcifications.  Impression: 1.  Negative for acute process involving the visualized osseous structures.  2.  Age-appropriate degenerative changes as detailed above.    Finalized on: 3/22/2024 10:05 AM By:  Delfino Cox MD  [facility]# [id number]      2024-03-22 10:08:02.240    [facility]         Recent Labs     04/01/24  1059   PH 7.438   PCO2 41.4   PO2 82   HCO3 27.9   POCSATURATED 96   BE 4*        Ordering Provider: Dr. Robin          Interpreting Provider: Dr. Robin  Performing nurse/tech/RT: ROSAURA Manuel, RRT  Diagnosis:  (pulmonary nodule)  Height: 5' 3" (160 cm)  Weight: 77.6 kg (170 lb 15.8 oz)  BMI (Calculated): 30.3              Patient Race:                                      "                            Phase Oxygen Assessment Supplemental O2 Heart   Rate Blood Pressure Pollo Dyspnea Scale Rating   Resting 96 % Room Air 109 bpm 116/74 0   Exercise             Minute             1 95 % Room Air 120 bpm       2 95 % Room Air 120 bpm       3 95 % Room Air 121 bpm       4 97 % Room Air 121 bpm       5 96 % Room Air 122 bpm       6  96 % Room Air 120 bpm 132/74 2   Recovery             Minute             1 99 % Room Air 118 bpm       2 99 % Room Air 108 bpm       3 99 % Room Air 107 bpm       4 98 % Room Air 105 bpm 131/77 0      Six Minute Walk Summary  6MWT Status: completed without stopping  Patient Reported: Dyspnea         Interpretation:  Did the patient stop or pause?: No  Total Time Walked (Calculated): 360 seconds  Final Partial Lap Distance (feet): 150 feet  Total Distance Meters (Calculated): 289.56 meters  Predicted Distance Meters (Calculated): 399.27 meters  Percentage of Predicted (Calculated): 72.52  Peak VO2 (Calculated): 12.67  Mets: 3.62  Has The Patient Had a Previous Six Minute Walk Test?: No     Previous 6MWT Results  Has The Patient Had a Previous Six Minute Walk Test?: No      PFT  FEV1: 1.39L( 80.8%), FVC 1.39L( 80.4%), FEV1/FVC 77  TLC 3.27L(68.6%)  DLCo 13.93( 69.4%)                Assessment/Plan:     Problem List Items Addressed This Visit       Hyperlipidemia associated with type 2 diabetes mellitus (Chronic)    Calcified granuloma of lung    BMI 31.0-31.9,adult    Controlled type 2 diabetes mellitus without complication, without long-term current use of insulin    Mild intermittent asthma    Solitary pulmonary nodule    Relevant Orders    Case Request Endoscopy: ENDOBRONCHIAL ULTRASOUND (EBUS) (Completed)    Ambulatory referral/consult to Cardiology    Hilar adenopathy - Primary     EBUS for station 10 L for mediastinal staging         Relevant Orders    Case Request Endoscopy: ENDOBRONCHIAL ULTRASOUND (EBUS) (Completed)    Ambulatory referral/consult to  Cardiology    CBC auto differential    Comprehensive Metabolic Panel        Would be surgical candidate if mediastinal staging -ve  CT biopsy on 04/04 ( lieu of Robotic due to technical issues) then ediastinal staging next week      My diagnostic impression and work-up plan were discussed at length with patient. Risks were discussed. EBUS procedure. Complications of the procedure discussed in detail with patient. Complications including but not limited to infection that may require hospital admission, bleeding that may require blood transfusion and or hospital admission, perforation of the lung which may require surgery,chance of injury to the throat, windpipe or bronchial tubes, laryngospam, coughing, aspiration, hypoxemia or cardiac arrythmias. Patient expressed and verbalized understanding. The material risks of anesthesia in connection with the procedure including brain damage, paralysis from the neck downwards, paralysis from the waist downwards, loss of function of an arm or a leg, disfigurement (incluing scars)and death were discussed with patient who expressedand verbalized understanding. Alternate treatments and material risks associated with such alternatives were discussed with pateint. These include radiologic surveillance with minimal risk and sugery with an indeterminate risk. The material risks of refusing the procedure was discussed in detail. This includes no diagnosis or confirmation of diagnisis and rendering of appropriate treatment the risk of which depends on the nature of the diagnosed illness. Patient expressed and verbalized understanding. Procedure scheduled for date 04/10/2024. Consent signed. Orders entered. Coagulation studies per orders.   All questions were answered and the patient expressed understanding      Follow up in about 4 weeks (around 4/29/2024), or Ebus consent, ref cardio, Labs today.    This note was prepared using voice recognition system and is likely to have sound alike  errors that may have been overlooked even after proof reading.  Please call me with any questions    Discussed diagnosis, its evaluation, treatment and usual course. All questions answered.    Thank you for the courtesy of participating in the care of this patient    Adrian Robin MD      Personal Diagnostic Review  []  CXR    []  ECHO    []  ONSAT    []  6MWD    []  LABS    []  CHEST CT    []  PET CT    []  Biopsy results

## 2024-04-01 NOTE — PROCEDURES
ABG completed. See ABG Below.    Component      Latest Ref Rng 4/1/2024   POC PH      7.35 - 7.45  7.438    POC PCO2      35 - 45 mmHg 41.4    POC PO2      80 - 100 mmHg 82    POC HCO3      24 - 28 mmol/L 27.9    POC BE      -2 to 2 mmol/L 4 (H)    POC SATURATED O2      95 - 100 % 96    Sample ARTERIAL    Site LR    Allens Test Pass    DelSys Room Air    Mode SPONT    FiO2 21       Legend:  (H) High      Interpretation:  [x] Arterial blood gases on room air demonstrate normal pCO2 and pO2  [] Arterial blood gases on room air are abnormal demonstrating hypercarbia (pCO2 >45 mmHg)  [] Arterial blood gases on room air are abnormal demonstrating hypocarbia (pCO2 < 35 mmHg)  [] Arterial blood gases on room air are abnormal demonstrating hypoxemia (pO2 < 80 mmHg)  [] Arterial blood gases on room air are abnormal demonstrating hyperoxemia (pO2 >120 mmHg)  [] Arterial blood gases on room air are abnormal demonstrating hypoxemia (pO2 < 80 mmHg) and hypercarbia (pCO2>45 mmHg)    The table below summarizes the main interpretations of the relationship between the arterial blood gases, pH, pCO2 and HCO-3    []    I

## 2024-04-03 ENCOUNTER — TELEPHONE (OUTPATIENT)
Dept: RADIOLOGY | Facility: HOSPITAL | Age: 74
End: 2024-04-03
Payer: MEDICARE

## 2024-04-03 NOTE — TELEPHONE ENCOUNTER
Pre-procedure phone call made at this time. Informed pt to be NPO after midnight, show up to [facility] on [address] at 0730, either have a ride with them or have a phone number for the person driving them home so that we can get in contact with them to keep them updated. Pt denies use of blood thinners or fish oils and has not taken her weight loss injections for the past 2 weeks. Answered all questions that the pt had and pt verbalized understanding of all discussed.

## 2024-04-04 ENCOUNTER — HOSPITAL ENCOUNTER (OUTPATIENT)
Dept: RADIOLOGY | Facility: HOSPITAL | Age: 74
Discharge: HOME | End: 2024-04-04
Attending: INTERNAL MEDICINE
Payer: MEDICARE

## 2024-04-04 ENCOUNTER — HOSPITAL ENCOUNTER (OUTPATIENT)
Dept: RADIOLOGY | Facility: HOSPITAL | Age: 74
Discharge: HOME | End: 2024-04-04
Attending: PHYSICIAN ASSISTANT
Payer: MEDICARE

## 2024-04-04 VITALS
WEIGHT: 171 LBS | DIASTOLIC BLOOD PRESSURE: 83 MMHG | SYSTOLIC BLOOD PRESSURE: 153 MMHG | OXYGEN SATURATION: 95 % | HEIGHT: 63 IN | HEART RATE: 97 BPM | BODY MASS INDEX: 30.3 KG/M2 | RESPIRATION RATE: 16 BRPM

## 2024-04-04 DIAGNOSIS — R91.8 PULMONARY NODULES: ICD-10-CM

## 2024-04-04 LAB
INR PPP: 0.9 (ref 0.8–1.2)
PROTHROMBIN TIME: 10.6 SEC (ref 9–12.5)

## 2024-04-04 PROCEDURE — 63600175 PHARM REV CODE 636 W HCPCS: Performed by: RADIOLOGY

## 2024-04-04 PROCEDURE — 71045 X-RAY EXAM CHEST 1 VIEW: CPT | Mod: 26,,, | Performed by: RADIOLOGY

## 2024-04-04 PROCEDURE — 71045 X-RAY EXAM CHEST 1 VIEW: CPT | Mod: TC

## 2024-04-04 PROCEDURE — A4215 STERILE NEEDLE: HCPCS

## 2024-04-04 PROCEDURE — 25000003 PHARM REV CODE 250: Performed by: RADIOLOGY

## 2024-04-04 PROCEDURE — 32408 CORE NDL BX LNG/MED PERQ: CPT | Mod: ,,, | Performed by: RADIOLOGY

## 2024-04-04 PROCEDURE — 85610 PROTHROMBIN TIME: CPT | Performed by: RADIOLOGY

## 2024-04-04 RX ORDER — FENTANYL CITRATE 50 UG/ML
INJECTION, SOLUTION INTRAMUSCULAR; INTRAVENOUS CODE/TRAUMA/SEDATION MEDICATION
Status: COMPLETED | OUTPATIENT
Start: 2024-04-04 | End: 2024-04-04

## 2024-04-04 RX ORDER — MIDAZOLAM HYDROCHLORIDE 1 MG/ML
INJECTION, SOLUTION INTRAMUSCULAR; INTRAVENOUS CODE/TRAUMA/SEDATION MEDICATION
Status: COMPLETED | OUTPATIENT
Start: 2024-04-04 | End: 2024-04-04

## 2024-04-04 RX ORDER — LIDOCAINE HYDROCHLORIDE 10 MG/ML
INJECTION INFILTRATION; PERINEURAL CODE/TRAUMA/SEDATION MEDICATION
Status: COMPLETED | OUTPATIENT
Start: 2024-04-04 | End: 2024-04-04

## 2024-04-04 RX ORDER — KETOROLAC TROMETHAMINE 30 MG/ML
15 INJECTION, SOLUTION INTRAMUSCULAR; INTRAVENOUS ONCE
Status: COMPLETED | OUTPATIENT
Start: 2024-04-04 | End: 2024-04-04

## 2024-04-04 RX ADMIN — KETOROLAC TROMETHAMINE 15 MG: 30 INJECTION, SOLUTION INTRAMUSCULAR; INTRAVENOUS at 10:04

## 2024-04-04 RX ADMIN — LIDOCAINE HYDROCHLORIDE 5 ML: 10 INJECTION, SOLUTION INFILTRATION; PERINEURAL at 09:04

## 2024-04-04 RX ADMIN — FENTANYL CITRATE 50 MCG: 50 INJECTION, SOLUTION INTRAMUSCULAR; INTRAVENOUS at 09:04

## 2024-04-04 RX ADMIN — FENTANYL CITRATE 25 MCG: 50 INJECTION, SOLUTION INTRAMUSCULAR; INTRAVENOUS at 09:04

## 2024-04-04 RX ADMIN — MIDAZOLAM HYDROCHLORIDE 0.5 MG: 1 INJECTION, SOLUTION INTRAMUSCULAR; INTRAVENOUS at 09:04

## 2024-04-04 NOTE — DISCHARGE SUMMARY
[facility] (H[facility])  Discharge Note  Short Stay    CT Biopsy Lung w/ guidance      OUTCOME: Patient tolerated treatment/procedure well without complication and is now ready for discharge.    DISPOSITION: Home or Self Care    FINAL DIAGNOSIS:  <principal problem not specified>    FOLLOWUP: In clinic    DISCHARGE INSTRUCTIONS:  No discharge procedures on file.      Clinical Reference Documents Added to Patient Instructions         Document    NEEDLE BIOPSY OF THE LUNG AND PLEURA (ENGLISH)    PROCEDURAL SEDATION, ADULT ED (ENGLISH)            TIME SPENT ON DISCHARGE: 15 minutes    Pre Op Diagnosis: left lung nodule     Post Op Diagnosis: same     Procedure:  biopsy     Procedure performed by: Ren MARES, Dayo MENDES     Written Informed Consent Obtained: Yes     Specimen Removed:  yes     Estimated Blood Loss:  minimal     Findings: Local anesthesia     Sedation:  yes     The patient tolerated the procedure well and there were no complications.      Disposition:  F/U in clinic or with ordering physician    Discharge instructions:  Light activity for 24 hours.  Remove band aid in 24 hours.  No baths (showers are appropriate).      Sterile technique was performed in the anterior left chest, lidocaine was used as a local anesthetic.  Multiple samples taken percutaneously from the left lung nodule.  Pt tolerated the procedure well without immediate complications.  Please see radiologist report for details. F/u with PCP and/or ordering physician.

## 2024-04-04 NOTE — PLAN OF CARE
Received patient from procedure via stretcher. VSS. Breathing even and unlabored. No complaints of pain, nausea, or headache. Procedure to left lung, bandage CDI. Family updated with patients status. Patient resting prone on stretcher.

## 2024-04-04 NOTE — PLAN OF CARE
Band aid to left chest, C/D/I with no bleeding/redness/swelling noted. VSS, NADN, and pt meets criteria for discharge. Discharge instructions given to and reviewed with pt, and pt verbalized understanding of all. Pt discharged to home, taken out via wheelchair and driven home by friend.

## 2024-04-04 NOTE — DISCHARGE INSTRUCTIONS
Please return to ER if any of these symptoms occur:  Fever over 101 degrees,  Any purulent drainage from site (pus, yellow or has foul odor), or any redness or swelling to site  Bleeding from the puncture site not controlled, If bleeding occurs at site hold pressure for 5 mins.  If bleeding continues go to ER  Pain not controlled with Aleve or Tylenol,    No driving for 24 hours after procedure due to sedation given during procedure.     Do not submerge in standing water for 2 days after biopsy but you may shower.    May change bandage if it becomes soiled and bandage may be removed in 2 days.    Resume home medications and diet    Biopsy results will be with Dr. Robin in 5-7 days, please follow up with him for results and any other questions or concerns that you may have.

## 2024-04-08 ENCOUNTER — PATIENT MESSAGE (OUTPATIENT)
Dept: INTERNAL MEDICINE | Facility: CLINIC | Age: 74
End: 2024-04-08
Payer: MEDICARE

## 2024-04-08 LAB
FINAL PATHOLOGIC DIAGNOSIS: ABNORMAL
GROSS: ABNORMAL
Lab: ABNORMAL
MICROSCOPIC EXAM: ABNORMAL

## 2024-04-09 ENCOUNTER — TELEPHONE (OUTPATIENT)
Dept: SLEEP MEDICINE | Facility: CLINIC | Age: 74
End: 2024-04-09
Payer: MEDICARE

## 2024-04-09 ENCOUNTER — TELEPHONE (OUTPATIENT)
Dept: HEMATOLOGY/ONCOLOGY | Facility: CLINIC | Age: 74
End: 2024-04-09
Payer: MEDICARE

## 2024-04-09 DIAGNOSIS — C34.92 ADENOCARCINOMA OF LEFT LUNG: Primary | ICD-10-CM

## 2024-04-09 DIAGNOSIS — Z79.4 CONTROLLED TYPE 2 DIABETES MELLITUS WITHOUT COMPLICATION, WITH LONG-TERM CURRENT USE OF INSULIN: ICD-10-CM

## 2024-04-09 DIAGNOSIS — E11.9 CONTROLLED TYPE 2 DIABETES MELLITUS WITHOUT COMPLICATION, WITH LONG-TERM CURRENT USE OF INSULIN: ICD-10-CM

## 2024-04-09 RX ORDER — LANCETS
EACH MISCELLANEOUS
Qty: 200 EACH | Refills: 3 | Status: SHIPPED | OUTPATIENT
Start: 2024-04-09 | End: 2024-05-30

## 2024-04-09 RX ORDER — INSULIN PUMP SYRINGE, 3 ML
EACH MISCELLANEOUS
Qty: 1 EACH | Refills: 0 | Status: SHIPPED | OUTPATIENT
Start: 2024-04-09 | End: 2024-04-09 | Stop reason: SDUPTHER

## 2024-04-09 RX ORDER — DEXTROSE 4 G
TABLET,CHEWABLE ORAL
Qty: 1 EACH | Refills: 0 | Status: SHIPPED | OUTPATIENT
Start: 2024-04-09 | End: 2024-05-30 | Stop reason: SDUPTHER

## 2024-04-09 NOTE — TELEPHONE ENCOUNTER
Spoke with patient  Relayed path was +ve for cancer  Adeno  For EBUS tomorrow    LUNG, LEFT, 'MASS', BIOPSY:   - Invasive lung adenocarcinoma   - See comment     Comment:   The tumor cells are positive for TTF-1 and negative for p40 by immunohistochemistry (IHC). The morphologic features and immunoprofile support the above diagnostic impression.     Tissue will be sent for molecular profiling by next-generation sequencing and PD-L1 analysis ([employer]). Results of these studies will be issued directly to the electronic medical record.

## 2024-04-09 NOTE — TELEPHONE ENCOUNTER
I called patient to review [employer] testing and their financial assistance. She requested for info to be sent to Payment plugin, message sent, no further questions at this time.

## 2024-04-10 ENCOUNTER — HOSPITAL ENCOUNTER (OUTPATIENT)
Facility: HOSPITAL | Age: 74
Discharge: HOME | End: 2024-04-10
Attending: INTERNAL MEDICINE | Admitting: INTERNAL MEDICINE
Payer: MEDICARE

## 2024-04-10 ENCOUNTER — ANESTHESIA EVENT (OUTPATIENT)
Dept: ENDOSCOPY | Facility: HOSPITAL | Age: 74
End: 2024-04-10
Payer: MEDICARE

## 2024-04-10 ENCOUNTER — ANESTHESIA (OUTPATIENT)
Dept: ENDOSCOPY | Facility: HOSPITAL | Age: 74
End: 2024-04-10
Payer: MEDICARE

## 2024-04-10 DIAGNOSIS — R59.0 HILAR ADENOPATHY: ICD-10-CM

## 2024-04-10 PROBLEM — C34.32 MALIGNANT NEOPLASM OF LOWER LOBE OF LEFT LUNG: Status: ACTIVE | Noted: 2024-04-10

## 2024-04-10 LAB
GLUCOSE SERPL-MCNC: 99 MG/DL (ref 70–110)
POCT GLUCOSE: 99 MG/DL (ref 70–110)

## 2024-04-10 PROCEDURE — 87015 SPECIMEN INFECT AGNT CONCNTJ: CPT | Performed by: INTERNAL MEDICINE

## 2024-04-10 PROCEDURE — 87077 CULTURE AEROBIC IDENTIFY: CPT | Performed by: INTERNAL MEDICINE

## 2024-04-10 PROCEDURE — 87102 FUNGUS ISOLATION CULTURE: CPT | Performed by: INTERNAL MEDICINE

## 2024-04-10 PROCEDURE — 31645 BRNCHSC W/THER ASPIR 1ST: CPT | Performed by: INTERNAL MEDICINE

## 2024-04-10 PROCEDURE — 88305 TISSUE EXAM BY PATHOLOGIST: CPT | Performed by: PATHOLOGY

## 2024-04-10 PROCEDURE — 87205 SMEAR GRAM STAIN: CPT | Performed by: INTERNAL MEDICINE

## 2024-04-10 PROCEDURE — 87107 FUNGI IDENTIFICATION MOLD: CPT | Performed by: INTERNAL MEDICINE

## 2024-04-10 PROCEDURE — 37000009 HC ANESTHESIA EA ADD 15 MINS: Performed by: INTERNAL MEDICINE

## 2024-04-10 PROCEDURE — 31652 BRONCH EBUS SAMPLNG 1/2 NODE: CPT | Performed by: INTERNAL MEDICINE

## 2024-04-10 PROCEDURE — 37000008 HC ANESTHESIA 1ST 15 MINUTES: Performed by: INTERNAL MEDICINE

## 2024-04-10 PROCEDURE — 63600175 PHARM REV CODE 636 W HCPCS: Performed by: STUDENT IN AN ORGANIZED HEALTH CARE EDUCATION/TRAINING PROGRAM

## 2024-04-10 PROCEDURE — 88173 CYTOPATH EVAL FNA REPORT: CPT | Mod: 59 | Performed by: PATHOLOGY

## 2024-04-10 PROCEDURE — 87185 SC STD ENZYME DETCJ PER NZM: CPT | Performed by: INTERNAL MEDICINE

## 2024-04-10 PROCEDURE — 88173 CYTOPATH EVAL FNA REPORT: CPT | Mod: 26,,, | Performed by: PATHOLOGY

## 2024-04-10 PROCEDURE — 27202059 HC NEEDLE, FNA (ANY): Performed by: INTERNAL MEDICINE

## 2024-04-10 PROCEDURE — 31652 BRONCH EBUS SAMPLNG 1/2 NODE: CPT | Mod: ,,, | Performed by: INTERNAL MEDICINE

## 2024-04-10 PROCEDURE — 82962 GLUCOSE BLOOD TEST: CPT | Performed by: INTERNAL MEDICINE

## 2024-04-10 PROCEDURE — 88305 TISSUE EXAM BY PATHOLOGIST: CPT | Mod: 26,,, | Performed by: PATHOLOGY

## 2024-04-10 PROCEDURE — 87210 SMEAR WET MOUNT SALINE/INK: CPT | Performed by: INTERNAL MEDICINE

## 2024-04-10 PROCEDURE — 87070 CULTURE OTHR SPECIMN AEROBIC: CPT | Performed by: INTERNAL MEDICINE

## 2024-04-10 PROCEDURE — 87206 SMEAR FLUORESCENT/ACID STAI: CPT | Performed by: INTERNAL MEDICINE

## 2024-04-10 PROCEDURE — 31645 BRNCHSC W/THER ASPIR 1ST: CPT | Mod: ,,, | Performed by: INTERNAL MEDICINE

## 2024-04-10 PROCEDURE — 87116 MYCOBACTERIA CULTURE: CPT | Performed by: INTERNAL MEDICINE

## 2024-04-10 PROCEDURE — 25000003 PHARM REV CODE 250: Performed by: STUDENT IN AN ORGANIZED HEALTH CARE EDUCATION/TRAINING PROGRAM

## 2024-04-10 PROCEDURE — 25000003 PHARM REV CODE 250: Performed by: INTERNAL MEDICINE

## 2024-04-10 RX ORDER — SODIUM CHLORIDE 9 MG/ML
INJECTION, SOLUTION INTRAVENOUS CONTINUOUS
Status: DISCONTINUED | OUTPATIENT
Start: 2024-04-10 | End: 2024-04-10 | Stop reason: HOSPADM

## 2024-04-10 RX ORDER — FENTANYL CITRATE 50 UG/ML
INJECTION, SOLUTION INTRAMUSCULAR; INTRAVENOUS
Status: DISCONTINUED | OUTPATIENT
Start: 2024-04-10 | End: 2024-04-10

## 2024-04-10 RX ORDER — LIDOCAINE HYDROCHLORIDE 20 MG/ML
INJECTION INTRAVENOUS
Status: DISCONTINUED | OUTPATIENT
Start: 2024-04-10 | End: 2024-04-10

## 2024-04-10 RX ORDER — ROCURONIUM BROMIDE 10 MG/ML
INJECTION, SOLUTION INTRAVENOUS
Status: DISCONTINUED | OUTPATIENT
Start: 2024-04-10 | End: 2024-04-10

## 2024-04-10 RX ORDER — LIDOCAINE HYDROCHLORIDE 10 MG/ML
INJECTION, SOLUTION EPIDURAL; INFILTRATION; INTRACAUDAL; PERINEURAL
Status: COMPLETED | OUTPATIENT
Start: 2024-04-10 | End: 2024-04-10

## 2024-04-10 RX ORDER — LIDOCAINE HYDROCHLORIDE 40 MG/ML
4 SOLUTION TOPICAL ONCE
Status: COMPLETED | OUTPATIENT
Start: 2024-04-10 | End: 2024-04-10

## 2024-04-10 RX ORDER — ONDANSETRON HYDROCHLORIDE 2 MG/ML
INJECTION, SOLUTION INTRAVENOUS
Status: DISCONTINUED | OUTPATIENT
Start: 2024-04-10 | End: 2024-04-10

## 2024-04-10 RX ORDER — LIDOCAINE HYDROCHLORIDE 20 MG/ML
INJECTION, SOLUTION EPIDURAL; INFILTRATION; INTRACAUDAL; PERINEURAL
Status: COMPLETED | OUTPATIENT
Start: 2024-04-10 | End: 2024-04-10

## 2024-04-10 RX ORDER — DEXAMETHASONE SODIUM PHOSPHATE 4 MG/ML
INJECTION, SOLUTION INTRA-ARTICULAR; INTRALESIONAL; INTRAMUSCULAR; INTRAVENOUS; SOFT TISSUE
Status: DISCONTINUED | OUTPATIENT
Start: 2024-04-10 | End: 2024-04-10

## 2024-04-10 RX ORDER — KETOROLAC TROMETHAMINE 30 MG/ML
INJECTION, SOLUTION INTRAMUSCULAR; INTRAVENOUS
Status: DISCONTINUED | OUTPATIENT
Start: 2024-04-10 | End: 2024-04-10

## 2024-04-10 RX ORDER — SUCCINYLCHOLINE CHLORIDE 20 MG/ML
INJECTION INTRAMUSCULAR; INTRAVENOUS
Status: DISCONTINUED | OUTPATIENT
Start: 2024-04-10 | End: 2024-04-10

## 2024-04-10 RX ORDER — PROPOFOL 10 MG/ML
VIAL (ML) INTRAVENOUS
Status: DISCONTINUED | OUTPATIENT
Start: 2024-04-10 | End: 2024-04-10

## 2024-04-10 RX ORDER — SODIUM CHLORIDE, SODIUM LACTATE, POTASSIUM CHLORIDE, CALCIUM CHLORIDE 600; 310; 30; 20 MG/100ML; MG/100ML; MG/100ML; MG/100ML
INJECTION, SOLUTION INTRAVENOUS CONTINUOUS
Status: DISCONTINUED | OUTPATIENT
Start: 2024-04-10 | End: 2024-04-10 | Stop reason: HOSPADM

## 2024-04-10 RX ADMIN — SODIUM CHLORIDE, SODIUM LACTATE, POTASSIUM CHLORIDE, AND CALCIUM CHLORIDE: .6; .31; .03; .02 INJECTION, SOLUTION INTRAVENOUS at 01:04

## 2024-04-10 RX ADMIN — ONDANSETRON 4 MG: 2 INJECTION INTRAMUSCULAR; INTRAVENOUS at 01:04

## 2024-04-10 RX ADMIN — PROPOFOL 150 MG: 10 INJECTION, EMULSION INTRAVENOUS at 01:04

## 2024-04-10 RX ADMIN — DEXAMETHASONE SODIUM PHOSPHATE 4 MG: 4 INJECTION, SOLUTION INTRA-ARTICULAR; INTRALESIONAL; INTRAMUSCULAR; INTRAVENOUS; SOFT TISSUE at 01:04

## 2024-04-10 RX ADMIN — LIDOCAINE HYDROCHLORIDE 100 MG: 20 INJECTION INTRAVENOUS at 01:04

## 2024-04-10 RX ADMIN — KETOROLAC TROMETHAMINE 30 MG: 30 INJECTION, SOLUTION INTRAMUSCULAR; INTRAVENOUS at 02:04

## 2024-04-10 RX ADMIN — SUCCINYLCHOLINE CHLORIDE 100 MG: 20 INJECTION, SOLUTION INTRAMUSCULAR; INTRAVENOUS; PARENTERAL at 01:04

## 2024-04-10 RX ADMIN — LIDOCAINE HYDROCHLORIDE 4 ML: 40 SOLUTION ORAL at 09:04

## 2024-04-10 RX ADMIN — ROCURONIUM BROMIDE 5 MG: 10 SOLUTION INTRAVENOUS at 01:04

## 2024-04-10 RX ADMIN — FENTANYL CITRATE 50 MCG: 50 INJECTION, SOLUTION INTRAMUSCULAR; INTRAVENOUS at 01:04

## 2024-04-10 RX ADMIN — SUGAMMADEX 200 MG: 100 INJECTION, SOLUTION INTRAVENOUS at 02:04

## 2024-04-10 RX ADMIN — ROCURONIUM BROMIDE 45 MG: 10 SOLUTION INTRAVENOUS at 01:04

## 2024-04-10 NOTE — TELEPHONE ENCOUNTER
Care Due:                  Date            Visit Type   Department     Provider  --------------------------------------------------------------------------------                                EP -                              PRIMARY      [facility] INTERNAL  Last Visit: 02-      CARE ([facility])   ANA Zhang                              EP -                              PRIMARY      [facility] INTERNAL  Next Visit: 05-      CARE ([facility])   ANA Zhang                                                            Last  Test          Frequency    Reason                     Performed    Due Date  --------------------------------------------------------------------------------    HBA1C.......  6 months...  semaglutide..............  12- 06-    [facility] Embedded Care Due Messages. Reference number: [id number].   4/10/2024 4:10:43 PM CDT

## 2024-04-10 NOTE — TRANSFER OF CARE
"Anesthesia Transfer of Care Note    Patient: Cassandra Campos    Procedure(s) Performed: Procedure(s) (LRB):  ENDOBRONCHIAL ULTRASOUND (EBUS) (Bilateral)    Patient location: PACU    Anesthesia Type: general    Transport from OR: Transported from OR on room air with adequate spontaneous ventilation    Post pain: adequate analgesia    Post assessment: no apparent anesthetic complications and tolerated procedure well    Post vital signs: stable    Level of consciousness: responds to stimulation and sedated    Nausea/Vomiting: no nausea/vomiting    Complications: none    Transfer of care protocol was followedComments: Report given to PACU RN at bedside. Hand off tool used. RN given opportunity to ask questions or clarify concerns. No Concerns verbalized. RN was asked if ready to assume care of patient. RN verbally confirmed. Pt. left in stable condition. SV. Vital Signs Return to Near Baseline. No s/s of distress noted.     Last vitals: Visit Vitals  /72 (BP Location: Left arm, Patient Position: Lying)   Pulse 95   Temp 37 °C (98.6 °F) (Temporal)   Resp 14   Ht 5' 3" (1.6 m)   Wt 77.6 kg (171 lb)   SpO2 95%   Breastfeeding No   BMI 30.29 kg/m²     "

## 2024-04-10 NOTE — OP NOTE
Patient intubated for procedure  8.5 ETT  Notable secretions  Washing obtained  Station 7: 5.4 mm: 3 passess  Station 11L : 6.3 mm: 3 passess  Station 11 R: 4.6 mm    Lidocaine 1%,  4 cc to right airway and left airway end of procedure    Extubated at end to recovery

## 2024-04-10 NOTE — ANESTHESIA PREPROCEDURE EVALUATION
04/10/2024  Cassandra Campos is a 73 y.o., female    Patient Active Problem List   Diagnosis    Hyperlipidemia associated with type 2 diabetes mellitus    Insomnia    Hip arthritis    Obesity    Paroxysmal SVT (supraventricular tachycardia)    GERD (gastroesophageal reflux disease)    Prepatellar bursitis of right knee    Transient synovitis of right knee    Chondromalacia of right knee    Primary hypertension    Calcified granuloma of lung    Osteopenia    Onychomycosis of multiple toenails with type 2 diabetes mellitus    Cough    Controlled type 2 diabetes mellitus without complication, without long-term current use of insulin    Secondary hypertension    Unequal blood pressures in paired extremities    De Quervain's disease (radial styloid tenosynovitis)    Breast screening    Tenosynovitis, de Quervain    Skin tag    Panic attack as reaction to stress    Mild intermittent asthma    BMI 31.0-31.9,adult    Elevated liver enzymes    Iron deficiency    Aortic atherosclerosis    Anxiety    Bilateral carpal tunnel syndrome    Major depressive disorder, single episode, mild    Solitary pulmonary nodule    Pelvic pain    Abdominal wall mass    Hilar mass    Hilar adenopathy     Past Medical History:   Diagnosis Date    Arthritis     hands    Bilateral bunions     Borderline glaucoma     De Quervain's disease (radial styloid tenosynovitis)     Gastritis     upper GI 2/2017    Hydradenitis     Hyperlipidemia     Hypertension     Insomnia     Migraines 02/01/2000    Nasal septum perforation     Obesity     Pneumonia     Restrictive airway disease     Sleep apnea     SVT (supraventricular tachycardia) 09/2013    Trigger finger     Type 2 diabetes mellitus 2012     am 02/01/2024     Past Surgical History:   Procedure Laterality Date    AXILLARY  HIDRADENITIS EXCISION Bilateral     BONE EXOSTOSIS EXCISION Right 2018    Procedure: EXCISION, EXOSTOSIS;  Surgeon: Jayro Pedraza Sr., MD;  Location: [facility] OR;  Service: Orthopedics;  Laterality: Right;    BREAST BIOPSY Bilateral     both benign    BREAST SURGERY  1998    CARPAL TUNNEL RELEASE      bilateral    CARPAL TUNNEL RELEASE Right 2024    Procedure: RELEASE, CARPAL TUNNEL;  Surgeon: Jaime Oswald MD;  Location: [facility] OR;  Service: Orthopedics;  Laterality: Right;    CARPAL TUNNEL RELEASE Left 3/8/2024    Procedure: RELEASE, CARPAL TUNNEL;  Surgeon: Jaime Oswald MD;  Location: [facility] OR;  Service: Orthopedics;  Laterality: Left;    CATARACT EXTRACTION Bilateral     OU     SECTION  1979    CHOLECYSTECTOMY  2014    COLONOSCOPY N/A 10/02/2020    Procedure: COLONOSCOPY;  Surgeon: Tushar Edwards MD;  Location: [facility];  Service: Endoscopy;  Laterality: N/A;    COLONOSCOPY W/ POLYPECTOMY  10/02/2020    Polyps x3, repeat 5 years; Tushar Edwards MD     CYST REMOVAL  2015    sebaceous cyst removed from face    DE QUERVAIN'S RELEASE Left 2020    Procedure: RELEASE, HAND, FOR DEQUERVAIN'S TENOSYNOVITIS;  Surgeon: Jaime Oswald MD;  Location: [facility] OR;  Service: Orthopedics;  Laterality: Left;    DE QUERVAIN'S RELEASE Right 2020    Procedure: RELEASE, HAND, FOR DEQUERVAIN'S TENOSYNOVITIS;  Surgeon: Jaime Oswald MD;  Location: [facility] OR;  Service: Orthopedics;  Laterality: Right;    EYE SURGERY      gastric sleeve  2017    Dr. Watson    KNEE SURGERY Right     OLECRANON BURSECTOMY Right 2018    Procedure: BURSECTOMY, OLECRANON;  Surgeon: Jayro Pedraza Sr., MD;  Location: [facility] OR;  Service: Orthopedics;  Laterality: Right;    SURGICAL REMOVAL OF BUNION WITH OSTEOTOMY OF METATARSAL BONE Left 05/10/2019    Procedure: BUNIONECTOMY, WITH METATARSAL OSTEOTOMY;  Surgeon: Srinivasan Villanueva DPM;  Location: [facility] OR;  Service:  Podiatry;  Laterality: Left;    SURGICAL REMOVAL OF BUNION WITH OSTEOTOMY OF METATARSAL BONE Right 06/28/2019    Procedure: BUNIONECTOMY, WITH METATARSAL OSTEOTOMY;  Surgeon: Srinivasan Villanueva DPM;  Location: [facility];  Service: Podiatry;  Laterality: Right;    TONSILLECTOMY, ADENOIDECTOMY  1980s    TRIGGER FINGER RELEASE Right 04/01/2015    Dr. Pedraza       Chemistry        Component Value Date/Time     04/01/2024 1211    K 3.8 04/01/2024 1211     04/01/2024 1211    CO2 26 04/01/2024 1211    BUN 10 04/01/2024 1211    CREATININE 0.7 04/01/2024 1211    GLU 92 04/01/2024 1211        Component Value Date/Time    CALCIUM 9.8 04/01/2024 1211    ALKPHOS 108 04/01/2024 1211    AST 31 04/01/2024 1211    ALT 53 (H) 04/01/2024 1211    BILITOT 0.3 04/01/2024 1211    ESTGFRAFRICA >60 06/21/2022 0935    EGFRNONAA >60 06/21/2022 0935        Lab Results   Component Value Date    WBC 8.34 04/01/2024    HGB 13.7 04/01/2024    HCT 41.5 04/01/2024    MCV 77 (L) 04/01/2024     04/01/2024       Pre-op Assessment    I have reviewed the Patient Summary Reports.    I have reviewed the NPO Status.   I have reviewed the Medications.     Review of Systems  Anesthesia Hx:  No problems with previous Anesthesia   History of prior surgery of interest to airway management or planning:  Previous anesthesia: General, MAC          Denies Personal Hx of Anesthesia complications.                    Social:  Non-Smoker       Hematology/Oncology:  Hematology Normal   Oncology Normal                                   Cardiovascular:     Hypertension              ECG has been reviewed. Normal sinus rhythm   Left axis deviation   Minimal voltage criteria for LVH, may be normal variant ( Madhav product )   Abnormal ECG   When compared with ECG of 19-NOV-2020 16:10,   No significant change was found   Confirmed by TY MARES, LAM ([id number]) on 2/1/2024 11:21:17 PM                            Pulmonary:  Pneumonia  Asthma mild   Sleep Apnea                 Renal/:  Renal/ Normal                 Hepatic/GI:     GERD             Musculoskeletal:  Arthritis   Bilateral CTS            Neurological:      Headaches                                 Endocrine:  Diabetes, type 2   BMI 31      Obesity / BMI > 30      Physical Exam  General: Well nourished, Cooperative, Alert and Oriented    Airway:  Mallampati: II   Mouth Opening: Normal  TM Distance: Normal  Tongue: Normal  Neck ROM: Normal ROM    Dental:  Intact      Anesthesia Plan  Type of Anesthesia, risks & benefits discussed:    Anesthesia Type: Gen ETT  Intra-op Monitoring Plan: Standard ASA Monitors  Post Op Pain Control Plan: multimodal analgesia and IV/PO Opioids PRN  Induction:  IV  Informed Consent: Informed consent signed with the Patient and all parties understand the risks and agree with anesthesia plan.  All questions answered.   ASA Score: 3  Day of Surgery Review of History & Physical: H&P Update referred to the surgeon/provider.    Ready For Surgery From Anesthesia Perspective.     .

## 2024-04-10 NOTE — ANESTHESIA PROCEDURE NOTES
Intubation    Date/Time: 4/10/2024 1:25 PM    Performed by: Moisés Samuels CRNA  Authorized by: Naman Gonzalez II, MD    Intubation:     Induction:  Intravenous    Intubated:  Postinduction    Mask Ventilation:  Easy mask    Attempts:  1    Attempted By:  CRNA    Method of Intubation:  Video laryngoscopy    Blade:  Mathis 3    Laryngeal View Grade: Grade I - full view of cords      Difficult Airway Encountered?: No      Complications:  None    Airway Device:  Oral endotracheal tube    Airway Device Size:  8.5    Style/Cuff Inflation:  Cuffed (inflated to minimal occlusive pressure)    Tube secured:  19    Secured at:  The lips    Placement Verified By:  Capnometry    Complicating Factors:  Obesity, short neck and oropharyngeal edema or fat    Findings Post-Intubation:  BS equal bilateral and atraumatic/condition of teeth unchanged

## 2024-04-10 NOTE — PLAN OF CARE
Dr Robin came to bedside and discussed findings. NO N/V,  no abdominal pain, no GI bleeding, and vitals stable.  Pt discharged from unit.

## 2024-04-10 NOTE — TELEPHONE ENCOUNTER
No care due was identified.  Health [facility] Embedded Care Due Messages. Reference number: [id number].   4/10/2024 3:04:37 PM CDT

## 2024-04-10 NOTE — DISCHARGE SUMMARY
[facility] (Hospital)  Discharge Note  Short Stay    Procedure(s) (LRB):  ENDOBRONCHIAL ULTRASOUND (EBUS) (Bilateral)      OUTCOME: Patient tolerated treatment/procedure well without complication and is now ready for discharge.    DISPOSITION: Home or Self Care    FINAL DIAGNOSIS:  Malignant neoplasm of lower lobe of left lung    FOLLOWUP: In clinic    DISCHARGE INSTRUCTIONS:  No discharge procedures on file.     TIME SPENT ON DISCHARGE: 15 minutes

## 2024-04-10 NOTE — ANESTHESIA POSTPROCEDURE EVALUATION
Anesthesia Post Evaluation    Patient: Cassandra Campos    Procedure(s) Performed: Procedure(s) (LRB):  ENDOBRONCHIAL ULTRASOUND (EBUS) (Bilateral)    Final Anesthesia Type: general      Patient location during evaluation: PACU  Patient participation: Yes- Able to Participate  Level of consciousness: awake and alert  Post-procedure vital signs: reviewed and not stable  Pain management: adequate  Airway patency: patent  DANIEL mitigation strategies: Preoperative initiation of continuous positive airway pressure (CPAP) or non-invasive positive pressure ventilation (NIPPV)  PONV status at discharge: No PONV  Anesthetic complications: no      Cardiovascular status: blood pressure returned to baseline and hemodynamically stable  Respiratory status: unassisted and spontaneous ventilation  Hydration status: euvolemic  Follow-up not needed.  Comments: Report given to PACU RN. Hand Off Tool Used. RN given opportunity to ask questions or clarify concerns. No Concerns verbalized. RN was asked if ready to assume care of patient. RN verbally confirmed. Pt. Left in stable condition. SV. Vital Signs Return to Near Baseline. No s/s of distress noted.           Vitals Value Taken Time   /72 04/10/24 0820   Temp 37 °C (98.6 °F) 04/10/24 0820   Pulse 95 04/10/24 0820   Resp 14 04/10/24 0820   SpO2 95 % 04/10/24 0820         No case tracking events are documented in the log.      Pain/Francheska Score: No data recorded         Is this the right Lisinopril dose? The chart says 40 mg.

## 2024-04-11 ENCOUNTER — TELEPHONE (OUTPATIENT)
Dept: PULMONOLOGY | Facility: CLINIC | Age: 74
End: 2024-04-11
Payer: MEDICARE

## 2024-04-11 VITALS
RESPIRATION RATE: 18 BRPM | HEART RATE: 86 BPM | DIASTOLIC BLOOD PRESSURE: 87 MMHG | HEIGHT: 63 IN | BODY MASS INDEX: 30.3 KG/M2 | OXYGEN SATURATION: 95 % | SYSTOLIC BLOOD PRESSURE: 132 MMHG | WEIGHT: 171 LBS | TEMPERATURE: 98 F

## 2024-04-11 LAB — KOH PREP SPEC: NORMAL

## 2024-04-11 RX ORDER — DEXTROSE 4 G
TABLET,CHEWABLE ORAL
Refills: 0 | OUTPATIENT
Start: 2024-04-11

## 2024-04-11 RX ORDER — ESZOPICLONE 3 MG/1
3 TABLET, FILM COATED ORAL NIGHTLY
Qty: 30 TABLET | Refills: 2 | Status: SHIPPED | OUTPATIENT
Start: 2024-04-11

## 2024-04-11 RX ORDER — LORAZEPAM 1 MG/1
1 TABLET ORAL 2 TIMES DAILY
Qty: 60 TABLET | Refills: 2 | Status: SHIPPED | OUTPATIENT
Start: 2024-04-11
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
## 2024-04-11 NOTE — TELEPHONE ENCOUNTER
Refill Decision Note   Cassandra Campos  is requesting a refill authorization.  Brief Assessment and Rationale for Refill:  Quick Discontinue     Medication Therapy Plan: Pharmacy is requesting new scripts for the following medications without required information, (sig/ frequency/qty/etc)     Medication Reconciliation Completed: No   Comments:     No Care Gaps recommended.     Note composed:6:11 AM 04/11/2024
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
## 2024-04-13 LAB
BACTERIA SPEC AEROBE CULT: ABNORMAL
BACTERIA SPEC AEROBE CULT: ABNORMAL
GRAM STN SPEC: ABNORMAL
GRAM STN SPEC: ABNORMAL

## 2024-04-15 DIAGNOSIS — C34.32 MALIGNANT NEOPLASM OF LOWER LOBE OF LEFT LUNG: ICD-10-CM

## 2024-04-15 DIAGNOSIS — J14 PNEUMONIA DUE TO HAEMOPHILUS INFLUENZAE, UNSPECIFIED LATERALITY, UNSPECIFIED PART OF LUNG: Primary | ICD-10-CM

## 2024-04-15 LAB
ADEQUACY: ABNORMAL
FINAL PATHOLOGIC DIAGNOSIS: ABNORMAL
Lab: ABNORMAL

## 2024-04-15 RX ORDER — AMOXICILLIN AND CLAVULANATE POTASSIUM 875; 125 MG/1; MG/1
1 TABLET, FILM COATED ORAL 2 TIMES DAILY
Qty: 14 TABLET | Refills: 0 | Status: SHIPPED | OUTPATIENT
Start: 2024-04-15 | End: 2024-04-23

## 2024-04-15 NOTE — PROGRESS NOTES
Discussed results  LN _ve  Left lung Bx +ve for cancer  Has PFT  Refer to rad onc and surgery    Abx for h influenzae      Requested Prescriptions     Signed Prescriptions Disp Refills    amoxicillin-clavulanate 875-125mg (AUGMENTIN) 875-125 mg per tablet 14 tablet 0     Sig: Take 1 tablet by mouth 2 (two) times daily. for 7 days        Orders Placed This Encounter   Procedures    Ambulatory referral/consult to Radiation Oncology     Standing Status:   Future     Standing Expiration Date:   5/15/2025     Referral Priority:   Routine     Referral Type:   Consultation     Referral Reason:   Specialty Services Required     Requested Specialty:   Radiation Oncology     Number of Visits Requested:   1    Ambulatory referral/consult to Cardiothoracic Surgery     Standing Status:   Future     Standing Expiration Date:   5/15/2025     Referral Priority:   Routine     Referral Type:   Consultation     Referral Reason:   Specialty Services Required     Referred to Provider:   Mike Nuñez MD     Requested Specialty:   Cardiothoracic Surgery     Number of Visits Requested:   1

## 2024-04-17 ENCOUNTER — PATIENT MESSAGE (OUTPATIENT)
Dept: INTERNAL MEDICINE | Facility: CLINIC | Age: 74
End: 2024-04-17
Payer: MEDICARE

## 2024-04-18 ENCOUNTER — OFFICE VISIT (OUTPATIENT)
Dept: CARDIOTHORACIC SURGERY | Facility: CLINIC | Age: 74
End: 2024-04-18
Payer: MEDICARE

## 2024-04-18 VITALS
DIASTOLIC BLOOD PRESSURE: 70 MMHG | WEIGHT: 175.06 LBS | BODY MASS INDEX: 31.02 KG/M2 | HEIGHT: 63 IN | HEART RATE: 103 BPM | SYSTOLIC BLOOD PRESSURE: 128 MMHG | OXYGEN SATURATION: 95 %

## 2024-04-18 DIAGNOSIS — M16.10 HIP ARTHRITIS: ICD-10-CM

## 2024-04-18 DIAGNOSIS — I10 PRIMARY HYPERTENSION: ICD-10-CM

## 2024-04-18 DIAGNOSIS — E11.9 CONTROLLED TYPE 2 DIABETES MELLITUS WITHOUT COMPLICATION, WITHOUT LONG-TERM CURRENT USE OF INSULIN: Primary | ICD-10-CM

## 2024-04-18 DIAGNOSIS — R91.1 SOLITARY PULMONARY NODULE: ICD-10-CM

## 2024-04-18 DIAGNOSIS — E11.9 CONTROLLED TYPE 2 DIABETES MELLITUS WITHOUT COMPLICATION, WITHOUT LONG-TERM CURRENT USE OF INSULIN: ICD-10-CM

## 2024-04-18 DIAGNOSIS — C34.32 MALIGNANT NEOPLASM OF LOWER LOBE OF LEFT LUNG: ICD-10-CM

## 2024-04-18 DIAGNOSIS — I47.10 PAROXYSMAL SVT (SUPRAVENTRICULAR TACHYCARDIA): ICD-10-CM

## 2024-04-18 DIAGNOSIS — J84.10 CALCIFIED GRANULOMA OF LUNG: ICD-10-CM

## 2024-04-18 DIAGNOSIS — R91.8 HILAR MASS: ICD-10-CM

## 2024-04-18 DIAGNOSIS — F43.0 PANIC ATTACK AS REACTION TO STRESS: ICD-10-CM

## 2024-04-18 DIAGNOSIS — F32.0 MAJOR DEPRESSIVE DISORDER, SINGLE EPISODE, MILD: ICD-10-CM

## 2024-04-18 DIAGNOSIS — F41.0 PANIC ATTACK AS REACTION TO STRESS: ICD-10-CM

## 2024-04-18 DIAGNOSIS — E66.01 CLASS 2 SEVERE OBESITY DUE TO EXCESS CALORIES WITH SERIOUS COMORBIDITY IN ADULT, UNSPECIFIED BMI: Chronic | ICD-10-CM

## 2024-04-18 DIAGNOSIS — E11.69 HYPERLIPIDEMIA ASSOCIATED WITH TYPE 2 DIABETES MELLITUS: Chronic | ICD-10-CM

## 2024-04-18 DIAGNOSIS — G56.03 BILATERAL CARPAL TUNNEL SYNDROME: Primary | ICD-10-CM

## 2024-04-18 DIAGNOSIS — E78.5 HYPERLIPIDEMIA ASSOCIATED WITH TYPE 2 DIABETES MELLITUS: Chronic | ICD-10-CM

## 2024-04-18 DIAGNOSIS — M65.4 DE QUERVAIN'S DISEASE (RADIAL STYLOID TENOSYNOVITIS): ICD-10-CM

## 2024-04-18 DIAGNOSIS — K21.00 GASTROESOPHAGEAL REFLUX DISEASE WITH ESOPHAGITIS WITHOUT HEMORRHAGE: Chronic | ICD-10-CM

## 2024-04-18 PROCEDURE — 99205 OFFICE O/P NEW HI 60 MIN: CPT | Mod: S$GLB,,, | Performed by: THORACIC SURGERY (CARDIOTHORACIC VASCULAR SURGERY)

## 2024-04-18 PROCEDURE — 3074F SYST BP LT 130 MM HG: CPT | Mod: CPTII,S$GLB,, | Performed by: THORACIC SURGERY (CARDIOTHORACIC VASCULAR SURGERY)

## 2024-04-18 PROCEDURE — 4010F ACE/ARB THERAPY RXD/TAKEN: CPT | Mod: CPTII,S$GLB,, | Performed by: THORACIC SURGERY (CARDIOTHORACIC VASCULAR SURGERY)

## 2024-04-18 PROCEDURE — 1125F AMNT PAIN NOTED PAIN PRSNT: CPT | Mod: CPTII,S$GLB,, | Performed by: THORACIC SURGERY (CARDIOTHORACIC VASCULAR SURGERY)

## 2024-04-18 PROCEDURE — 3288F FALL RISK ASSESSMENT DOCD: CPT | Mod: CPTII,S$GLB,, | Performed by: THORACIC SURGERY (CARDIOTHORACIC VASCULAR SURGERY)

## 2024-04-18 PROCEDURE — 1101F PT FALLS ASSESS-DOCD LE1/YR: CPT | Mod: CPTII,S$GLB,, | Performed by: THORACIC SURGERY (CARDIOTHORACIC VASCULAR SURGERY)

## 2024-04-18 PROCEDURE — 1159F MED LIST DOCD IN RCRD: CPT | Mod: CPTII,S$GLB,, | Performed by: THORACIC SURGERY (CARDIOTHORACIC VASCULAR SURGERY)

## 2024-04-18 PROCEDURE — 3078F DIAST BP <80 MM HG: CPT | Mod: CPTII,S$GLB,, | Performed by: THORACIC SURGERY (CARDIOTHORACIC VASCULAR SURGERY)

## 2024-04-18 PROCEDURE — 99999 PR PBB SHADOW E&M-EST. PATIENT-LVL V: CPT | Mod: PBBFAC,,, | Performed by: THORACIC SURGERY (CARDIOTHORACIC VASCULAR SURGERY)

## 2024-04-18 RX ORDER — SODIUM CHLORIDE 0.9 % (FLUSH) 0.9 %
10 SYRINGE (ML) INJECTION
Status: ACTIVE | OUTPATIENT
Start: 2024-04-18

## 2024-04-18 RX ORDER — BLOOD-GLUCOSE SENSOR
1 EACH MISCELLANEOUS CONTINUOUS
Qty: 12 EACH | Refills: 3 | Status: SHIPPED | OUTPATIENT
Start: 2024-04-18 | End: 2025-04-18

## 2024-04-18 NOTE — PROGRESS NOTES
Subjective:      Patient ID: Cassandra Campos is a 73 y.o. female.    Chief Complaint: No chief complaint on file.    HPI:  73-year-old female who has a ex-smoker found to have a left upper lobe pulmonary nodule had a biopsy which showed adenocarcinoma.  Patient has history of hypertension type 2 diabetes mellitus and hyperlipidemia.  No history of cough no history of hemoptysis at this time.  She had a full workup including a PET scan which showed left upper lobe lesion which has high activity and a mediastinal lymph node.  She underwent biopsy for mediastinal lymph node with an EBUS which came back as negative for malignancy.  She is here to discuss her surgery and risk for her left upper lobe adenocarcinoma.  Review of patient's allergies indicates:  No Known Allergies    Past Medical History:   Diagnosis Date    Arthritis     hands    Bilateral bunions     Borderline glaucoma     De Quervain's disease (radial styloid tenosynovitis)     Gastritis     upper GI 2017    Hydradenitis     Hyperlipidemia     Hypertension     Insomnia     Migraines 2000    Nasal septum perforation     Obesity     Pneumonia     Restrictive airway disease     Sleep apnea     SVT (supraventricular tachycardia) 2013    Trigger finger     Type 2 diabetes mellitus      am 2024       Family History   Problem Relation Name Age of Onset    Prostate cancer Brother      Diabetes Maternal Aunt Alondra Campos     Diabetes Cousin      Hypertension Maternal Grandmother Portia Orosco        Social History     Socioeconomic History    Marital status:     Number of children: 1   Occupational History    Occupation:  aid   Tobacco Use    Smoking status: Former     Current packs/day: 0.00     Average packs/day: 0.5 packs/day for 42.0 years (21.0 ttl pk-yrs)     Types: Cigarettes     Start date: 1970     Quit date: 2012     Years since quittin.3     Passive exposure: Past    Smokeless tobacco:  Never   Substance and Sexual Activity    Alcohol use: Not Currently     Alcohol/week: 1.0 standard drink of alcohol     Types: 1 Glasses of wine per week     Comment: Glass red wine once a every 2 weeks    Drug use: Never    Sexual activity: Not Currently     Partners: Female     Birth control/protection: Abstinence, None   Social History Narrative    Single, part-time teacher. Masters degree biology.      Social Determinants of Health     Financial Resource Strain: Low Risk  (12/19/2023)    Overall Financial Resource Strain (CARDIA)     Difficulty of Paying Living Expenses: Not hard at all   Food Insecurity: Food Insecurity Present (12/19/2023)    Hunger Vital Sign     Worried About Running Out of Food in the Last Year: Never true     Ran Out of Food in the Last Year: Sometimes true   Transportation Needs: No Transportation Needs (12/19/2023)    PRAPARE - Transportation     Lack of Transportation (Medical): No     Lack of Transportation (Non-Medical): No   Physical Activity: Insufficiently Active (12/19/2023)    Exercise Vital Sign     Days of Exercise per Week: 3 days     Minutes of Exercise per Session: 20 min   Stress: No Stress Concern Present (12/19/2023)    [place] [employer] - Occupational Stress Questionnaire     Feeling of Stress : Not at all   Social Connections: Unknown (12/19/2023)    Social Connection and Isolation Panel [NHANES]     Frequency of Communication with Friends and Family: Never     Frequency of Social Gatherings with Friends and Family: More than three times a week     Active Member of Clubs or Organizations: Patient declined     Attends Club or Organization Meetings: 1 to 4 times per year     Marital Status:    Housing Stability: Low Risk  (12/19/2023)    Housing Stability Vital Sign     Unable to Pay for Housing in the Last Year: No     Number of Places Lived in the Last Year: 1     Unstable Housing in the Last Year: No       Past Surgical History:    Procedure Laterality Date    AXILLARY HIDRADENITIS EXCISION Bilateral     BONE EXOSTOSIS EXCISION Right 2018    Procedure: EXCISION, EXOSTOSIS;  Surgeon: Jayro Pedraza Sr., MD;  Location: [facility];  Service: Orthopedics;  Laterality: Right;    BREAST BIOPSY Bilateral     both benign    BREAST SURGERY  1998    CARPAL TUNNEL RELEASE      bilateral    CARPAL TUNNEL RELEASE Right 2024    Procedure: RELEASE, CARPAL TUNNEL;  Surgeon: Jaime Oswald MD;  Location: [facility] OR;  Service: Orthopedics;  Laterality: Right;    CARPAL TUNNEL RELEASE Left 3/8/2024    Procedure: RELEASE, CARPAL TUNNEL;  Surgeon: Jaime Oswald MD;  Location: [facility] OR;  Service: Orthopedics;  Laterality: Left;    CATARACT EXTRACTION Bilateral     OU     SECTION  1979    CHOLECYSTECTOMY  2014    COLONOSCOPY N/A 10/02/2020    Procedure: COLONOSCOPY;  Surgeon: Tushar Edwards MD;  Location: [facility];  Service: Endoscopy;  Laterality: N/A;    COLONOSCOPY W/ POLYPECTOMY  10/02/2020    Polyps x3, repeat 5 years; Tushar Edwards MD     CYST REMOVAL  2015    sebaceous cyst removed from face    DE QUERVAIN'S RELEASE Left 2020    Procedure: RELEASE, HAND, FOR DEQUERVAIN'S TENOSYNOVITIS;  Surgeon: Jaime Oswald MD;  Location: [facility] OR;  Service: Orthopedics;  Laterality: Left;    DE QUERVAIN'S RELEASE Right 2020    Procedure: RELEASE, HAND, FOR DEQUERVAIN'S TENOSYNOVITIS;  Surgeon: Jaime Oswald MD;  Location: [facility];  Service: Orthopedics;  Laterality: Right;    ENDOBRONCHIAL ULTRASOUND Bilateral 4/10/2024    Procedure: ENDOBRONCHIAL ULTRASOUND (EBUS);  Surgeon: Adrian Robin MD;  Location: [facility];  Service: Pulmonary;  Laterality: Bilateral;    EYE SURGERY      gastric sleeve  2017    Dr. Watson    KNEE SURGERY Right     OLECRANON BURSECTOMY Right 2018    Procedure: BURSECTOMY, OLECRANON;  Surgeon: Jayro Pedraza Sr., MD;  Location: [facility];  Service: Orthopedics;   "Laterality: Right;    SURGICAL REMOVAL OF BUNION WITH OSTEOTOMY OF METATARSAL BONE Left 05/10/2019    Procedure: BUNIONECTOMY, WITH METATARSAL OSTEOTOMY;  Surgeon: Srinivasan Villanueva DPM;  Location: [facility] OR;  Service: Podiatry;  Laterality: Left;    SURGICAL REMOVAL OF BUNION WITH OSTEOTOMY OF METATARSAL BONE Right 06/28/2019    Procedure: BUNIONECTOMY, WITH METATARSAL OSTEOTOMY;  Surgeon: Srinivasan Villanueva DPM;  Location: [facility] OR;  Service: Podiatry;  Laterality: Right;    TONSILLECTOMY, ADENOIDECTOMY  1980s    TRIGGER FINGER RELEASE Right 04/01/2015    Dr. Pedraza       Review of Systems   Constitutional:  Negative for activity change and appetite change.   HENT:  Negative for dental problem, nosebleeds and sore throat.    Eyes:  Negative for discharge and visual disturbance.   Respiratory:  Negative for cough, chest tightness and stridor.    Cardiovascular:  Negative for leg swelling.   Gastrointestinal:  Negative for abdominal distention and abdominal pain.   Genitourinary:  Negative for difficulty urinating and dysuria.   Musculoskeletal:  Positive for arthralgias and myalgias. Negative for back pain.   Allergic/Immunologic: Negative for environmental allergies.   Neurological:  Negative for dizziness, syncope and headaches.   Hematological:  Does not bruise/bleed easily.   Psychiatric/Behavioral:  Negative for behavioral problems.           Objective:   /70   Pulse 103   Ht 5' 3" (1.6 m)   Wt 79.4 kg (175 lb 0.7 oz)   SpO2 95%   BMI 31.01 kg/m²     X-Ray Chest 1 View  Narrative: EXAMINATION:  XR CHEST 1 VIEW    CLINICAL HISTORY:  2 hour s/p left lung biopsy.  inspiraiton. show to radiologist.;    TECHNIQUE:  Single frontal view of the chest was performed.    COMPARISON:  <4/4/24>    FINDINGS:  In comparison to the prior study, there is no adverse interval changes  Impression: In comparison to the prior study, there is no adverse interval changes    Electronically signed by: Sergei Mcclure, " MD  Date:    04/04/2024  Time:    11:42  X-Ray Chest 1 View  Narrative: EXAMINATION:  XR CHEST 1 VIEW    CLINICAL HISTORY:  s/p left lung biopsy. inspiration. show to radiologist.;    TECHNIQUE:  Single frontal view of the chest was performed.    COMPARISON:  11/03/2021    FINDINGS:  Small post biopsy infiltrate/blood within the left lower lung zone from recent biopsy.  No pneumothorax.  In comparison to the prior study, there is no adverse interval changes  Impression: In comparison to the prior study, there is no adverse interval changes    Electronically signed by: Sergei Mcclure MD  Date:    04/04/2024  Time:    10:29  CT Biopsy Lung w/ guidance  Narrative: EXAMINATION:  CT BIOPSY LUNG W/ GUIDANCE    CLINICAL HISTORY:  Other nonspecific abnormal finding of lung fieldLeft subpleural lingular 23 mm FDG avid mas;    COMPARISON:  02/16/2024    FINDINGS:  Informed consent was obtained prior to the exam after discussion of risks and benefits of the procedure with the patient.  All questions were answered and signed informed consent was obtained.    All elements of maximal sterile barrier technique including cap and mask and sterile gown and sterile gloves and sterile full-body draped and hand hygiene and 2% chlorhexidine for cutaneous and sepsis.    5 mm CT images were obtained through the chest for localization which demonstrated left upper lobe lung mass.  1% lidocaine was used for local anesthesia.  A 19 gauge needle was advanced to the lesion.  Position was confirmed with CT images.  Four core biopsies were obtained using a 20 gauge core biopsy set.  Needle was removed and hemostasis achieved.  Post images demonstrate no immediate complication.  Patient tolerated the procedure.  Impression: Successful CT-guided left upper lobe lung biopsy.    All CT scans at this facility use dose modulation, iterative reconstruction, and/or weight based dosing when appropriate to reduce radiation dose to as low as reasonable  achievable.    Electronically signed by: Sergei Mcclure MD  Date:    04/04/2024  Time:    09:48     Reading Physician Reading Date Result Priority   Juan Pablo Vega MD  [phone] 3/12/2024 Routine     Narrative & Impression  EXAMINATION:  NM PET CT FDG SKULL BASE TO MID THIGH     CLINICAL HISTORY:  Lung nodule, > 8mm; Other nonspecific abnormal finding of lung field     TECHNIQUE:  10.9 mCi of F18-FDG was administered intravenously in the left antecubital fossa.  After an approximately 60 min distribution time, PET/CT images were acquired from the skull base to the mid thigh. Transmission images were acquired to correct for attenuation using a whole body low-dose CT scan without contrast with the arms positioned above the head. Glycemia at the time of injection was 97 mg/dL.     COMPARISON:  Chest CT, 02/16/2024.  PET CT, 11/06/2017.     FINDINGS:  Quality of the study: Adequate.     SUV max of the liver parenchyma is 2.7     Neck: No abnormal FDG avidity.  No lymphadenopathy.     Chest: Lingular subpleural pulmonary nodule measuring 2.3 x 2.2 cm with SUV max 8.4.     Subpleural posterior left lower lobe pulmonary nodule measuring 1.4 x 1.1 cm with SUV max 1.4.     Left hilar lymph node measuring 1.8 x 1.3 cm with SUV max 4.7.     Left axillary lymph node measuring 1.9 x 1.5 cm with eccentric cortical thickening with SUV max 3.6.     Abdomen/pelvis: No abnormal FDG avidity.  No ascites or lymphadenopathy.     Skeletal structures: No abnormal FDG avidity.  No focal lytic or sclerotic lesion.     Physiologic uptake of the tracer is present within the brain, salivary glands, myocardium, GI and  tracts.     Incidental CT findings: N/A     Impression:     1. Markedly FDG avid lingular subpleural pulmonary nodule concerning for primary lung cancer.  2. Mildly FDG avid left hilar lymph node measuring 1.8 x 1.3 cm, nonspecific, but could be a metastatic left hilar lymph node.  3. Mildly FDG avid left axillary lymph node  as described above, nonspecific.  4. No abnormal FDG avidity in the neck, abdomen, pelvis, or osseous structures.  All CT scans at this facility are performed  using dose modulation techniques as appropriate to performed exam including the following:  automated exposure control; adjustment of mA and/or kV according to the patients size (this includes techniques or standardized protocols for targeted exams where dose is matched to indication/reason for exam: i.e. extremities or head);  iterative reconstruction technique.     1.3 and a DLCO of 70%.      Physical Exam  Vitals reviewed.   Constitutional:       Appearance: Normal appearance.   HENT:      Head: Normocephalic and atraumatic.      Mouth/Throat:      Mouth: Mucous membranes are moist.   Eyes:      Extraocular Movements: Extraocular movements intact.   Cardiovascular:      Rate and Rhythm: Normal rate and regular rhythm.      Pulses: Normal pulses.      Heart sounds: Normal heart sounds.   Pulmonary:      Effort: Pulmonary effort is normal.      Breath sounds: Normal breath sounds.   Abdominal:      Palpations: Abdomen is soft.   Musculoskeletal:         General: Normal range of motion.      Cervical back: Normal range of motion and neck supple.   Skin:     General: Skin is warm.      Capillary Refill: Capillary refill takes less than 2 seconds.   Neurological:      General: No focal deficit present.      Mental Status: She is alert and oriented to person, place, and time.   Psychiatric:         Mood and Affect: Mood normal.         Behavior: Behavior normal.         Assessment:     1. Bilateral carpal tunnel syndrome    2. Major depressive disorder, single episode, mild    3. Panic attack as reaction to stress    4. Calcified granuloma of lung    5. Hilar mass    6. Solitary pulmonary nodule    7. Hyperlipidemia associated with type 2 diabetes mellitus    8. Primary hypertension    9. Paroxysmal SVT (supraventricular tachycardia)    10. Class 2 severe  obesity due to excess calories with serious comorbidity in adult, unspecified BMI    11. Controlled type 2 diabetes mellitus without complication, without long-term current use of insulin    12. Gastroesophageal reflux disease with esophagitis without hemorrhage    13. Hip arthritis    14. De Quervain's disease (radial styloid tenosynovitis)        Plan   Cardiology clearance pending with Dr. Bell  I will schedule her for robotic assisted left upper lobectomy lingual lobectomy and mediastinal lymph node dissection on May 10, 2024  I have explained the risks benefits and alternatives of the procedure and the patient understand after detailed discussion and agreed to proceed.          Mike Nuñez MD,   [facility]  [place]

## 2024-04-18 NOTE — H&P (VIEW-ONLY)
Subjective:      Patient ID: Cassandra Campos is a 73 y.o. female.    Chief Complaint: No chief complaint on file.    HPI:  73-year-old female who has a ex-smoker found to have a left upper lobe pulmonary nodule had a biopsy which showed adenocarcinoma.  Patient has history of hypertension type 2 diabetes mellitus and hyperlipidemia.  No history of cough no history of hemoptysis at this time.  She had a full workup including a PET scan which showed left upper lobe lesion which has high activity and a mediastinal lymph node.  She underwent biopsy for mediastinal lymph node with an EBUS which came back as negative for malignancy.  She is here to discuss her surgery and risk for her left upper lobe adenocarcinoma.  Review of patient's allergies indicates:  No Known Allergies    Past Medical History:   Diagnosis Date    Arthritis     hands    Bilateral bunions     Borderline glaucoma     De Quervain's disease (radial styloid tenosynovitis)     Gastritis     upper GI 2017    Hydradenitis     Hyperlipidemia     Hypertension     Insomnia     Migraines 2000    Nasal septum perforation     Obesity     Pneumonia     Restrictive airway disease     Sleep apnea     SVT (supraventricular tachycardia) 2013    Trigger finger     Type 2 diabetes mellitus      am 2024       Family History   Problem Relation Name Age of Onset    Prostate cancer Brother      Diabetes Maternal Aunt Alondra Campos     Diabetes Cousin      Hypertension Maternal Grandmother Portia Orosco        Social History     Socioeconomic History    Marital status:     Number of children: 1   Occupational History    Occupation:  aid   Tobacco Use    Smoking status: Former     Current packs/day: 0.00     Average packs/day: 0.5 packs/day for 42.0 years (21.0 ttl pk-yrs)     Types: Cigarettes     Start date: 1970     Quit date: 2012     Years since quittin.3     Passive exposure: Past    Smokeless tobacco:  Never   Substance and Sexual Activity    Alcohol use: Not Currently     Alcohol/week: 1.0 standard drink of alcohol     Types: 1 Glasses of wine per week     Comment: Glass red wine once a every 2 weeks    Drug use: Never    Sexual activity: Not Currently     Partners: Female     Birth control/protection: Abstinence, None   Social History Narrative    Single, part-time teacher. Masters degree biology.      Social Determinants of Health     Financial Resource Strain: Low Risk  (12/19/2023)    Overall Financial Resource Strain (CARDIA)     Difficulty of Paying Living Expenses: Not hard at all   Food Insecurity: Food Insecurity Present (12/19/2023)    Hunger Vital Sign     Worried About Running Out of Food in the Last Year: Never true     Ran Out of Food in the Last Year: Sometimes true   Transportation Needs: No Transportation Needs (12/19/2023)    PRAPARE - Transportation     Lack of Transportation (Medical): No     Lack of Transportation (Non-Medical): No   Physical Activity: Insufficiently Active (12/19/2023)    Exercise Vital Sign     Days of Exercise per Week: 3 days     Minutes of Exercise per Session: 20 min   Stress: No Stress Concern Present (12/19/2023)    [place] [employer] - Occupational Stress Questionnaire     Feeling of Stress : Not at all   Social Connections: Unknown (12/19/2023)    Social Connection and Isolation Panel [NHANES]     Frequency of Communication with Friends and Family: Never     Frequency of Social Gatherings with Friends and Family: More than three times a week     Active Member of Clubs or Organizations: Patient declined     Attends Club or Organization Meetings: 1 to 4 times per year     Marital Status:    Housing Stability: Low Risk  (12/19/2023)    Housing Stability Vital Sign     Unable to Pay for Housing in the Last Year: No     Number of Places Lived in the Last Year: 1     Unstable Housing in the Last Year: No       Past Surgical History:    Procedure Laterality Date    AXILLARY HIDRADENITIS EXCISION Bilateral     BONE EXOSTOSIS EXCISION Right 2018    Procedure: EXCISION, EXOSTOSIS;  Surgeon: Jayro Pedraza Sr., MD;  Location: [facility];  Service: Orthopedics;  Laterality: Right;    BREAST BIOPSY Bilateral     both benign    BREAST SURGERY  1998    CARPAL TUNNEL RELEASE      bilateral    CARPAL TUNNEL RELEASE Right 2024    Procedure: RELEASE, CARPAL TUNNEL;  Surgeon: Jaime Oswald MD;  Location: [facility] OR;  Service: Orthopedics;  Laterality: Right;    CARPAL TUNNEL RELEASE Left 3/8/2024    Procedure: RELEASE, CARPAL TUNNEL;  Surgeon: Jaime Oswald MD;  Location: [facility] OR;  Service: Orthopedics;  Laterality: Left;    CATARACT EXTRACTION Bilateral     OU     SECTION  1979    CHOLECYSTECTOMY  2014    COLONOSCOPY N/A 10/02/2020    Procedure: COLONOSCOPY;  Surgeon: Tushar Edwards MD;  Location: [facility];  Service: Endoscopy;  Laterality: N/A;    COLONOSCOPY W/ POLYPECTOMY  10/02/2020    Polyps x3, repeat 5 years; Tushar Edwards MD     CYST REMOVAL  2015    sebaceous cyst removed from face    DE QUERVAIN'S RELEASE Left 2020    Procedure: RELEASE, HAND, FOR DEQUERVAIN'S TENOSYNOVITIS;  Surgeon: Jaime Oswald MD;  Location: [facility] OR;  Service: Orthopedics;  Laterality: Left;    DE QUERVAIN'S RELEASE Right 2020    Procedure: RELEASE, HAND, FOR DEQUERVAIN'S TENOSYNOVITIS;  Surgeon: Jaime Oswald MD;  Location: [facility];  Service: Orthopedics;  Laterality: Right;    ENDOBRONCHIAL ULTRASOUND Bilateral 4/10/2024    Procedure: ENDOBRONCHIAL ULTRASOUND (EBUS);  Surgeon: Adrian Robin MD;  Location: [facility];  Service: Pulmonary;  Laterality: Bilateral;    EYE SURGERY      gastric sleeve  2017    Dr. Watson    KNEE SURGERY Right     OLECRANON BURSECTOMY Right 2018    Procedure: BURSECTOMY, OLECRANON;  Surgeon: Jayro Pedraza Sr., MD;  Location: [facility];  Service: Orthopedics;   "Laterality: Right;    SURGICAL REMOVAL OF BUNION WITH OSTEOTOMY OF METATARSAL BONE Left 05/10/2019    Procedure: BUNIONECTOMY, WITH METATARSAL OSTEOTOMY;  Surgeon: Srinivasan Villanueva DPM;  Location: [facility] OR;  Service: Podiatry;  Laterality: Left;    SURGICAL REMOVAL OF BUNION WITH OSTEOTOMY OF METATARSAL BONE Right 06/28/2019    Procedure: BUNIONECTOMY, WITH METATARSAL OSTEOTOMY;  Surgeon: Srinivasan Villanueva DPM;  Location: [facility] OR;  Service: Podiatry;  Laterality: Right;    TONSILLECTOMY, ADENOIDECTOMY  1980s    TRIGGER FINGER RELEASE Right 04/01/2015    Dr. Pedraza       Review of Systems   Constitutional:  Negative for activity change and appetite change.   HENT:  Negative for dental problem, nosebleeds and sore throat.    Eyes:  Negative for discharge and visual disturbance.   Respiratory:  Negative for cough, chest tightness and stridor.    Cardiovascular:  Negative for leg swelling.   Gastrointestinal:  Negative for abdominal distention and abdominal pain.   Genitourinary:  Negative for difficulty urinating and dysuria.   Musculoskeletal:  Positive for arthralgias and myalgias. Negative for back pain.   Allergic/Immunologic: Negative for environmental allergies.   Neurological:  Negative for dizziness, syncope and headaches.   Hematological:  Does not bruise/bleed easily.   Psychiatric/Behavioral:  Negative for behavioral problems.           Objective:   /70   Pulse 103   Ht 5' 3" (1.6 m)   Wt 79.4 kg (175 lb 0.7 oz)   SpO2 95%   BMI 31.01 kg/m²     X-Ray Chest 1 View  Narrative: EXAMINATION:  XR CHEST 1 VIEW    CLINICAL HISTORY:  2 hour s/p left lung biopsy.  inspiraiton. show to radiologist.;    TECHNIQUE:  Single frontal view of the chest was performed.    COMPARISON:  <4/4/24>    FINDINGS:  In comparison to the prior study, there is no adverse interval changes  Impression: In comparison to the prior study, there is no adverse interval changes    Electronically signed by: Sergei Mcclure, " MD  Date:    04/04/2024  Time:    11:42  X-Ray Chest 1 View  Narrative: EXAMINATION:  XR CHEST 1 VIEW    CLINICAL HISTORY:  s/p left lung biopsy. inspiration. show to radiologist.;    TECHNIQUE:  Single frontal view of the chest was performed.    COMPARISON:  11/03/2021    FINDINGS:  Small post biopsy infiltrate/blood within the left lower lung zone from recent biopsy.  No pneumothorax.  In comparison to the prior study, there is no adverse interval changes  Impression: In comparison to the prior study, there is no adverse interval changes    Electronically signed by: Sergei Mcclure MD  Date:    04/04/2024  Time:    10:29  CT Biopsy Lung w/ guidance  Narrative: EXAMINATION:  CT BIOPSY LUNG W/ GUIDANCE    CLINICAL HISTORY:  Other nonspecific abnormal finding of lung fieldLeft subpleural lingular 23 mm FDG avid mas;    COMPARISON:  02/16/2024    FINDINGS:  Informed consent was obtained prior to the exam after discussion of risks and benefits of the procedure with the patient.  All questions were answered and signed informed consent was obtained.    All elements of maximal sterile barrier technique including cap and mask and sterile gown and sterile gloves and sterile full-body draped and hand hygiene and 2% chlorhexidine for cutaneous and sepsis.    5 mm CT images were obtained through the chest for localization which demonstrated left upper lobe lung mass.  1% lidocaine was used for local anesthesia.  A 19 gauge needle was advanced to the lesion.  Position was confirmed with CT images.  Four core biopsies were obtained using a 20 gauge core biopsy set.  Needle was removed and hemostasis achieved.  Post images demonstrate no immediate complication.  Patient tolerated the procedure.  Impression: Successful CT-guided left upper lobe lung biopsy.    All CT scans at this facility use dose modulation, iterative reconstruction, and/or weight based dosing when appropriate to reduce radiation dose to as low as reasonable  achievable.    Electronically signed by: Sergei Mcclure MD  Date:    04/04/2024  Time:    09:48     Reading Physician Reading Date Result Priority   Juan Pablo Vega MD  [phone] 3/12/2024 Routine     Narrative & Impression  EXAMINATION:  NM PET CT FDG SKULL BASE TO MID THIGH     CLINICAL HISTORY:  Lung nodule, > 8mm; Other nonspecific abnormal finding of lung field     TECHNIQUE:  10.9 mCi of F18-FDG was administered intravenously in the left antecubital fossa.  After an approximately 60 min distribution time, PET/CT images were acquired from the skull base to the mid thigh. Transmission images were acquired to correct for attenuation using a whole body low-dose CT scan without contrast with the arms positioned above the head. Glycemia at the time of injection was 97 mg/dL.     COMPARISON:  Chest CT, 02/16/2024.  PET CT, 11/06/2017.     FINDINGS:  Quality of the study: Adequate.     SUV max of the liver parenchyma is 2.7     Neck: No abnormal FDG avidity.  No lymphadenopathy.     Chest: Lingular subpleural pulmonary nodule measuring 2.3 x 2.2 cm with SUV max 8.4.     Subpleural posterior left lower lobe pulmonary nodule measuring 1.4 x 1.1 cm with SUV max 1.4.     Left hilar lymph node measuring 1.8 x 1.3 cm with SUV max 4.7.     Left axillary lymph node measuring 1.9 x 1.5 cm with eccentric cortical thickening with SUV max 3.6.     Abdomen/pelvis: No abnormal FDG avidity.  No ascites or lymphadenopathy.     Skeletal structures: No abnormal FDG avidity.  No focal lytic or sclerotic lesion.     Physiologic uptake of the tracer is present within the brain, salivary glands, myocardium, GI and  tracts.     Incidental CT findings: N/A     Impression:     1. Markedly FDG avid lingular subpleural pulmonary nodule concerning for primary lung cancer.  2. Mildly FDG avid left hilar lymph node measuring 1.8 x 1.3 cm, nonspecific, but could be a metastatic left hilar lymph node.  3. Mildly FDG avid left axillary lymph node  as described above, nonspecific.  4. No abnormal FDG avidity in the neck, abdomen, pelvis, or osseous structures.  All CT scans at this facility are performed  using dose modulation techniques as appropriate to performed exam including the following:  automated exposure control; adjustment of mA and/or kV according to the patients size (this includes techniques or standardized protocols for targeted exams where dose is matched to indication/reason for exam: i.e. extremities or head);  iterative reconstruction technique.     1.3 and a DLCO of 70%.      Physical Exam  Vitals reviewed.   Constitutional:       Appearance: Normal appearance.   HENT:      Head: Normocephalic and atraumatic.      Mouth/Throat:      Mouth: Mucous membranes are moist.   Eyes:      Extraocular Movements: Extraocular movements intact.   Cardiovascular:      Rate and Rhythm: Normal rate and regular rhythm.      Pulses: Normal pulses.      Heart sounds: Normal heart sounds.   Pulmonary:      Effort: Pulmonary effort is normal.      Breath sounds: Normal breath sounds.   Abdominal:      Palpations: Abdomen is soft.   Musculoskeletal:         General: Normal range of motion.      Cervical back: Normal range of motion and neck supple.   Skin:     General: Skin is warm.      Capillary Refill: Capillary refill takes less than 2 seconds.   Neurological:      General: No focal deficit present.      Mental Status: She is alert and oriented to person, place, and time.   Psychiatric:         Mood and Affect: Mood normal.         Behavior: Behavior normal.         Assessment:     1. Bilateral carpal tunnel syndrome    2. Major depressive disorder, single episode, mild    3. Panic attack as reaction to stress    4. Calcified granuloma of lung    5. Hilar mass    6. Solitary pulmonary nodule    7. Hyperlipidemia associated with type 2 diabetes mellitus    8. Primary hypertension    9. Paroxysmal SVT (supraventricular tachycardia)    10. Class 2 severe  obesity due to excess calories with serious comorbidity in adult, unspecified BMI    11. Controlled type 2 diabetes mellitus without complication, without long-term current use of insulin    12. Gastroesophageal reflux disease with esophagitis without hemorrhage    13. Hip arthritis    14. De Quervain's disease (radial styloid tenosynovitis)        Plan   Cardiology clearance pending with Dr. Bell  I will schedule her for robotic assisted left upper lobectomy lingual lobectomy and mediastinal lymph node dissection on May 10, 2024  I have explained the risks benefits and alternatives of the procedure and the patient understand after detailed discussion and agreed to proceed.          Mike Nuñez MD,   [facility]  [place]

## 2024-04-19 ENCOUNTER — TELEPHONE (OUTPATIENT)
Dept: RADIATION ONCOLOGY | Facility: CLINIC | Age: 74
End: 2024-04-19
Payer: MEDICARE

## 2024-04-19 NOTE — TELEPHONE ENCOUNTER
Called patient to schedule consult appt in radiation oncology. Patient stated she is having surgery on May 10 th and if everything goes well, she will not need radiation. She wants to wait until a couple of weeks after surgery to schedule appt if it is still needed. Informed patient I will set a reminder to give her a call back. Patient agreed to plan.

## 2024-04-22 ENCOUNTER — HOSPITAL ENCOUNTER (OUTPATIENT)
Dept: CARDIOLOGY | Facility: HOSPITAL | Age: 74
Discharge: HOME | End: 2024-04-22
Attending: THORACIC SURGERY (CARDIOTHORACIC VASCULAR SURGERY)
Payer: MEDICARE

## 2024-04-22 ENCOUNTER — OFFICE VISIT (OUTPATIENT)
Dept: CARDIOLOGY | Facility: CLINIC | Age: 74
End: 2024-04-22
Payer: MEDICARE

## 2024-04-22 ENCOUNTER — HOSPITAL ENCOUNTER (OUTPATIENT)
Dept: RADIOLOGY | Facility: HOSPITAL | Age: 74
Discharge: HOME | End: 2024-04-22
Attending: THORACIC SURGERY (CARDIOTHORACIC VASCULAR SURGERY)
Payer: MEDICARE

## 2024-04-22 VITALS
BODY MASS INDEX: 30.54 KG/M2 | SYSTOLIC BLOOD PRESSURE: 112 MMHG | HEIGHT: 63 IN | HEART RATE: 105 BPM | DIASTOLIC BLOOD PRESSURE: 80 MMHG | OXYGEN SATURATION: 95 % | WEIGHT: 172.38 LBS

## 2024-04-22 DIAGNOSIS — I77.89 OTHER SPECIFIED DISORDERS OF ARTERIES AND ARTERIOLES: ICD-10-CM

## 2024-04-22 DIAGNOSIS — I10 PRIMARY HYPERTENSION: ICD-10-CM

## 2024-04-22 DIAGNOSIS — E11.9 CONTROLLED TYPE 2 DIABETES MELLITUS WITHOUT COMPLICATION, WITHOUT LONG-TERM CURRENT USE OF INSULIN: ICD-10-CM

## 2024-04-22 DIAGNOSIS — E78.5 HYPERLIPIDEMIA ASSOCIATED WITH TYPE 2 DIABETES MELLITUS: Chronic | ICD-10-CM

## 2024-04-22 DIAGNOSIS — R91.1 SOLITARY PULMONARY NODULE: ICD-10-CM

## 2024-04-22 DIAGNOSIS — R91.8 HILAR MASS: ICD-10-CM

## 2024-04-22 DIAGNOSIS — C34.32 MALIGNANT NEOPLASM OF LOWER LOBE OF LEFT LUNG: ICD-10-CM

## 2024-04-22 DIAGNOSIS — I70.0 CALCIFICATION OF AORTA: ICD-10-CM

## 2024-04-22 DIAGNOSIS — R59.0 HILAR ADENOPATHY: ICD-10-CM

## 2024-04-22 DIAGNOSIS — I47.10 PAROXYSMAL SVT (SUPRAVENTRICULAR TACHYCARDIA): ICD-10-CM

## 2024-04-22 DIAGNOSIS — I70.0 AORTIC ATHEROSCLEROSIS: ICD-10-CM

## 2024-04-22 DIAGNOSIS — Z01.810 PREOP CARDIOVASCULAR EXAM: Primary | ICD-10-CM

## 2024-04-22 DIAGNOSIS — E11.69 HYPERLIPIDEMIA ASSOCIATED WITH TYPE 2 DIABETES MELLITUS: Chronic | ICD-10-CM

## 2024-04-22 DIAGNOSIS — J84.10 CALCIFIED GRANULOMA OF LUNG: ICD-10-CM

## 2024-04-22 LAB
OHS QRS DURATION: 86 MS
OHS QTC CALCULATION: 438 MS

## 2024-04-22 PROCEDURE — 1101F PT FALLS ASSESS-DOCD LE1/YR: CPT | Mod: CPTII,S$GLB,, | Performed by: INTERNAL MEDICINE

## 2024-04-22 PROCEDURE — 3079F DIAST BP 80-89 MM HG: CPT | Mod: CPTII,S$GLB,, | Performed by: INTERNAL MEDICINE

## 2024-04-22 PROCEDURE — 71046 X-RAY EXAM CHEST 2 VIEWS: CPT | Mod: 26,,, | Performed by: RADIOLOGY

## 2024-04-22 PROCEDURE — 99999 PR PBB SHADOW E&M-EST. PATIENT-LVL V: CPT | Mod: PBBFAC,,, | Performed by: INTERNAL MEDICINE

## 2024-04-22 PROCEDURE — 1126F AMNT PAIN NOTED NONE PRSNT: CPT | Mod: CPTII,S$GLB,, | Performed by: INTERNAL MEDICINE

## 2024-04-22 PROCEDURE — 3074F SYST BP LT 130 MM HG: CPT | Mod: CPTII,S$GLB,, | Performed by: INTERNAL MEDICINE

## 2024-04-22 PROCEDURE — 71046 X-RAY EXAM CHEST 2 VIEWS: CPT | Mod: TC

## 2024-04-22 PROCEDURE — 93010 ELECTROCARDIOGRAM REPORT: CPT | Mod: ,,, | Performed by: INTERNAL MEDICINE

## 2024-04-22 PROCEDURE — 3288F FALL RISK ASSESSMENT DOCD: CPT | Mod: CPTII,S$GLB,, | Performed by: INTERNAL MEDICINE

## 2024-04-22 PROCEDURE — 93005 ELECTROCARDIOGRAM TRACING: CPT

## 2024-04-22 PROCEDURE — 1160F RVW MEDS BY RX/DR IN RCRD: CPT | Mod: CPTII,S$GLB,, | Performed by: INTERNAL MEDICINE

## 2024-04-22 PROCEDURE — 99204 OFFICE O/P NEW MOD 45 MIN: CPT | Mod: S$GLB,,, | Performed by: INTERNAL MEDICINE

## 2024-04-22 PROCEDURE — 1159F MED LIST DOCD IN RCRD: CPT | Mod: CPTII,S$GLB,, | Performed by: INTERNAL MEDICINE

## 2024-04-22 PROCEDURE — 4010F ACE/ARB THERAPY RXD/TAKEN: CPT | Mod: CPTII,S$GLB,, | Performed by: INTERNAL MEDICINE

## 2024-04-22 NOTE — PROGRESS NOTES
Subjective:   Patient ID:  Cassandra Campos is a 73 y.o. female who presents for evaluation of Establish Care      70 yo, female, came in for preop clearance of robotic lobectomy for lung ca  PMH PSVT, HTN, HLP, NIDDM 10 yrs, obesity, ex smoker, DANIEL, not on CPAP. No h/o MI CVA  Had bariatric surgery done in . Uncomplicated and last 30 pounds.  Walks 2 miles daily.   No smoking and occasional drink  Lives alone and no limitation of exercise  Patient feels OK, no chest pain, no sob, no palpitation, no dizziness, no syncope, no CNS symptoms.      Interval history  Plan robotic lobectomy for lung ca. Quit smoking 20 yrs ago.  Walks at home. No chest pain dizziness orthopnea . On inhaler as needed  EKG reviewed by myself today reveals NSR nonspecific STT change sinus tachy and LVH.  No change compared to prior one in 2020.                                   No results found for this or any previous visit.     No results found for this or any previous visit.       Past Medical History:   Diagnosis Date    Arthritis     hands    Bilateral bunions     Borderline glaucoma     De Quervain's disease (radial styloid tenosynovitis)     Gastritis     upper GI 2/2017    Hydradenitis     Hyperlipidemia     Hypertension     Insomnia     Migraines 02/01/2000    Nasal septum perforation     Obesity     Pneumonia     Restrictive airway disease     Sleep apnea     SVT (supraventricular tachycardia) 09/2013    Trigger finger     Type 2 diabetes mellitus 2012     am 02/01/2024       Past Surgical History:   Procedure Laterality Date    AXILLARY HIDRADENITIS EXCISION Bilateral     BONE EXOSTOSIS EXCISION Right 07/25/2018    Procedure: EXCISION, EXOSTOSIS;  Surgeon: Jayro Pedraza Sr., MD;  Location: [facility];  Service: Orthopedics;  Laterality: Right;    BREAST BIOPSY Bilateral     both benign    BREAST SURGERY  07/1998    CARPAL TUNNEL RELEASE      bilateral    CARPAL TUNNEL RELEASE Right 2/9/2024    Procedure: RELEASE,  CARPAL TUNNEL;  Surgeon: Jaime Oswald MD;  Location: [facility];  Service: Orthopedics;  Laterality: Right;    CARPAL TUNNEL RELEASE Left 3/8/2024    Procedure: RELEASE, CARPAL TUNNEL;  Surgeon: Jaime Oswald MD;  Location: [facility];  Service: Orthopedics;  Laterality: Left;    CATARACT EXTRACTION Bilateral     OU     SECTION  1979    CHOLECYSTECTOMY  2014    COLONOSCOPY N/A 10/02/2020    Procedure: COLONOSCOPY;  Surgeon: Tushar Edwards MD;  Location: [facility];  Service: Endoscopy;  Laterality: N/A;    COLONOSCOPY W/ POLYPECTOMY  10/02/2020    Polyps x3, repeat 5 years; Tushar Edwards MD     CYST REMOVAL  2015    sebaceous cyst removed from face    DE QUERVAIN'S RELEASE Left 2020    Procedure: RELEASE, HAND, FOR DEQUERVAIN'S TENOSYNOVITIS;  Surgeon: Jaime Oswald MD;  Location: [facility];  Service: Orthopedics;  Laterality: Left;    DE QUERVAIN'S RELEASE Right 2020    Procedure: RELEASE, HAND, FOR DEQUERVAIN'S TENOSYNOVITIS;  Surgeon: Jaime Oswald MD;  Location: [facility];  Service: Orthopedics;  Laterality: Right;    ENDOBRONCHIAL ULTRASOUND Bilateral 4/10/2024    Procedure: ENDOBRONCHIAL ULTRASOUND (EBUS);  Surgeon: Adrian Robin MD;  Location: [facility];  Service: Pulmonary;  Laterality: Bilateral;    EYE SURGERY      gastric sleeve  2017    Dr. Watson    KNEE SURGERY Right     OLECRANON BURSECTOMY Right 2018    Procedure: BURSECTOMY, OLECRANON;  Surgeon: Jayro Pedraza Sr., MD;  Location: [facility];  Service: Orthopedics;  Laterality: Right;    SURGICAL REMOVAL OF BUNION WITH OSTEOTOMY OF METATARSAL BONE Left 05/10/2019    Procedure: BUNIONECTOMY, WITH METATARSAL OSTEOTOMY;  Surgeon: Srinivasan Villanueva DPM;  Location: [facility];  Service: Podiatry;  Laterality: Left;    SURGICAL REMOVAL OF BUNION WITH OSTEOTOMY OF METATARSAL BONE Right 2019    Procedure: BUNIONECTOMY, WITH METATARSAL OSTEOTOMY;  Surgeon: Srinivasan Villanueva DPM;   Location: [facility];  Service: Podiatry;  Laterality: Right;    TONSILLECTOMY, ADENOIDECTOMY  1980s    TRIGGER FINGER RELEASE Right 2015    Dr. Pedraza       Social History     Tobacco Use    Smoking status: Former     Current packs/day: 0.00     Average packs/day: 0.5 packs/day for 42.0 years (21.0 ttl pk-yrs)     Types: Cigarettes     Start date: 1970     Quit date: 2012     Years since quittin.3     Passive exposure: Past    Smokeless tobacco: Never   Substance Use Topics    Alcohol use: Not Currently     Alcohol/week: 1.0 standard drink of alcohol     Types: 1 Glasses of wine per week     Comment: Glass red wine once a every 2 weeks    Drug use: Never       Family History   Problem Relation Name Age of Onset    Prostate cancer Brother      Diabetes Maternal Aunt Alondra Campos     Diabetes Cousin      Hypertension Maternal Grandmother Portia Orosco        Review of Systems   Constitutional: Negative for decreased appetite, diaphoresis, fever, malaise/fatigue and night sweats.   HENT:  Negative for nosebleeds.    Eyes:  Negative for blurred vision and double vision.   Cardiovascular:  Positive for dyspnea on exertion. Negative for chest pain, claudication, irregular heartbeat, leg swelling, near-syncope, orthopnea, palpitations, paroxysmal nocturnal dyspnea and syncope.   Respiratory:  Positive for shortness of breath. Negative for cough, sleep disturbances due to breathing, snoring, sputum production and wheezing.    Endocrine: Negative for cold intolerance and polyuria.   Hematologic/Lymphatic: Does not bruise/bleed easily.   Skin:  Negative for rash.   Musculoskeletal:  Negative for back pain, falls, joint pain, joint swelling and neck pain.   Gastrointestinal:  Negative for abdominal pain, heartburn, nausea and vomiting.   Genitourinary:  Negative for dysuria, frequency and hematuria.   Neurological:  Negative for difficulty with concentration, dizziness, focal weakness, headaches, light-headedness,  numbness, seizures and weakness.   Psychiatric/Behavioral:  Negative for depression, memory loss and substance abuse. The patient does not have insomnia.    Allergic/Immunologic: Negative for HIV exposure and hives.       Objective:   Physical Exam  HENT:      Head: Normocephalic.   Eyes:      Pupils: Pupils are equal, round, and reactive to light.   Neck:      Thyroid: No thyromegaly.      Vascular: Normal carotid pulses. No carotid bruit or JVD.   Cardiovascular:      Rate and Rhythm: Normal rate and regular rhythm. No extrasystoles are present.     Chest Wall: PMI is not displaced.      Pulses: Normal pulses.      Heart sounds: Normal heart sounds. No murmur heard.     No gallop. No S3 sounds.   Pulmonary:      Effort: No respiratory distress.      Breath sounds: Normal breath sounds. No stridor.   Abdominal:      General: Bowel sounds are normal.      Palpations: Abdomen is soft.      Tenderness: There is no abdominal tenderness. There is no rebound.   Skin:     Findings: No rash.   Neurological:      Mental Status: She is alert and oriented to person, place, and time.   Psychiatric:         Behavior: Behavior normal.         Lab Results   Component Value Date    CHOL 184 12/26/2023    CHOL 190 09/12/2022    CHOL 208 (H) 04/13/2022     Lab Results   Component Value Date    HDL 48 12/26/2023    HDL 39 (L) 09/12/2022    HDL 37 (L) 04/13/2022     Lab Results   Component Value Date    LDLCALC 110.4 12/26/2023    LDLCALC 114.6 09/12/2022    LDLCALC 129.0 04/13/2022     Lab Results   Component Value Date    TRIG 128 12/26/2023    TRIG 182 (H) 09/12/2022    TRIG 210 (H) 04/13/2022     Lab Results   Component Value Date    CHOLHDL 26.1 12/26/2023    CHOLHDL 20.5 09/12/2022    CHOLHDL 17.8 (L) 04/13/2022       Chemistry        Component Value Date/Time     04/01/2024 1211    K 3.8 04/01/2024 1211     04/01/2024 1211    CO2 26 04/01/2024 1211    BUN 10 04/01/2024 1211    CREATININE 0.7 04/01/2024 1211    GLU  92 04/01/2024 1211        Component Value Date/Time    CALCIUM 9.8 04/01/2024 1211    ALKPHOS 108 04/01/2024 1211    AST 31 04/01/2024 1211    ALT 53 (H) 04/01/2024 1211    BILITOT 0.3 04/01/2024 1211    ESTGFRAFRICA >60 06/21/2022 0935    EGFRNONAA >60 06/21/2022 0935          Lab Results   Component Value Date    HGBA1C 5.5 12/26/2023     Lab Results   Component Value Date    TSH 3.220 09/12/2022     Lab Results   Component Value Date    INR 0.9 04/04/2024    INR 1.0 06/21/2022     Lab Results   Component Value Date    WBC 8.34 04/01/2024    HGB 13.7 04/01/2024    HCT 41.5 04/01/2024    MCV 77 (L) 04/01/2024     04/01/2024     BNP  @LABRCNTIP(BNP,BNPTRIAGEBLO)@  CrCl cannot be calculated (Patient's most recent lab result is older than the maximum 7 days allowed.).  No results found in the last 24 hours.  No results found in the last 24 hours.  No results found in the last 24 hours.    Assessment:      1. Preop cardiovascular exam    2. Solitary pulmonary nodule    3. Hilar adenopathy    4. Calcified granuloma of lung    5. Hilar mass    6. Aortic atherosclerosis    7. Hyperlipidemia associated with type 2 diabetes mellitus    8. Paroxysmal SVT (supraventricular tachycardia)    9. Primary hypertension    10. Controlled type 2 diabetes mellitus without complication, without long-term current use of insulin    11. Calcification of aorta        Plan:   Echo for PREOP    Carotid US   Continue losartan and Metoprolol  DM Rx per PCP  Counseled DASH  Check Lipid profile with PCP in 6 months  Recommend heart-healthy diet, weight control and regular exercise.  Adrian. Risk modification.   I have reviewed all pertinent labs and cardiac studies independently. Plans and recommendations have been formulated under my direct supervision. All questions answered and patient voiced understanding.   If symptoms persist go to the ED  RTC as needed       05/02/24 addendum  Echo showed normal function.  Elevated periop risk of CV  events for non-high risk procedure.  Ok to proceed the scheduled procedure without further cardiac study.

## 2024-04-25 ENCOUNTER — OFFICE VISIT (OUTPATIENT)
Dept: INTERNAL MEDICINE | Facility: CLINIC | Age: 74
End: 2024-04-25
Payer: MEDICARE

## 2024-04-25 ENCOUNTER — HOSPITAL ENCOUNTER (OUTPATIENT)
Dept: RADIOLOGY | Facility: HOSPITAL | Age: 74
Discharge: HOME | End: 2024-04-25
Attending: FAMILY MEDICINE
Payer: MEDICARE

## 2024-04-25 VITALS
TEMPERATURE: 98 F | OXYGEN SATURATION: 96 % | DIASTOLIC BLOOD PRESSURE: 70 MMHG | BODY MASS INDEX: 30.97 KG/M2 | HEIGHT: 63 IN | HEART RATE: 101 BPM | WEIGHT: 174.81 LBS | SYSTOLIC BLOOD PRESSURE: 118 MMHG

## 2024-04-25 DIAGNOSIS — J42 CHRONIC BRONCHITIS, UNSPECIFIED CHRONIC BRONCHITIS TYPE: ICD-10-CM

## 2024-04-25 DIAGNOSIS — I10 PRIMARY HYPERTENSION: ICD-10-CM

## 2024-04-25 DIAGNOSIS — F41.9 ANXIETY: ICD-10-CM

## 2024-04-25 DIAGNOSIS — R05.9 COUGH, UNSPECIFIED TYPE: ICD-10-CM

## 2024-04-25 DIAGNOSIS — F41.0 PANIC ATTACK AS REACTION TO STRESS: ICD-10-CM
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
## 2024-04-25 DIAGNOSIS — E11.9 CONTROLLED TYPE 2 DIABETES MELLITUS WITHOUT COMPLICATION, WITH LONG-TERM CURRENT USE OF INSULIN: ICD-10-CM

## 2024-04-25 DIAGNOSIS — G47.00 INSOMNIA, UNSPECIFIED TYPE: ICD-10-CM

## 2024-04-25 DIAGNOSIS — Z79.4 CONTROLLED TYPE 2 DIABETES MELLITUS WITHOUT COMPLICATION, WITH LONG-TERM CURRENT USE OF INSULIN: ICD-10-CM

## 2024-04-25 DIAGNOSIS — I47.10 PAROXYSMAL SVT (SUPRAVENTRICULAR TACHYCARDIA): ICD-10-CM

## 2024-04-25 DIAGNOSIS — C34.32 MALIGNANT NEOPLASM OF LOWER LOBE OF LEFT LUNG: ICD-10-CM

## 2024-04-25 DIAGNOSIS — F43.0 PANIC ATTACK AS REACTION TO STRESS: ICD-10-CM

## 2024-04-25 PROCEDURE — 1101F PT FALLS ASSESS-DOCD LE1/YR: CPT | Mod: CPTII,S$GLB,, | Performed by: FAMILY MEDICINE

## 2024-04-25 PROCEDURE — G2211 COMPLEX E/M VISIT ADD ON: HCPCS | Mod: S$GLB,,, | Performed by: FAMILY MEDICINE

## 2024-04-25 PROCEDURE — 1159F MED LIST DOCD IN RCRD: CPT | Mod: CPTII,S$GLB,, | Performed by: FAMILY MEDICINE

## 2024-04-25 PROCEDURE — 99999 PR PBB SHADOW E&M-EST. PATIENT-LVL V: CPT | Mod: PBBFAC,,, | Performed by: FAMILY MEDICINE

## 2024-04-25 PROCEDURE — 1125F AMNT PAIN NOTED PAIN PRSNT: CPT | Mod: CPTII,S$GLB,, | Performed by: FAMILY MEDICINE

## 2024-04-25 PROCEDURE — 3288F FALL RISK ASSESSMENT DOCD: CPT | Mod: CPTII,S$GLB,, | Performed by: FAMILY MEDICINE

## 2024-04-25 PROCEDURE — 4010F ACE/ARB THERAPY RXD/TAKEN: CPT | Mod: CPTII,S$GLB,, | Performed by: FAMILY MEDICINE

## 2024-04-25 PROCEDURE — 3078F DIAST BP <80 MM HG: CPT | Mod: CPTII,S$GLB,, | Performed by: FAMILY MEDICINE

## 2024-04-25 PROCEDURE — 73560 X-RAY EXAM OF KNEE 1 OR 2: CPT | Mod: TC,59,RT

## 2024-04-25 PROCEDURE — 73562 X-RAY EXAM OF KNEE 3: CPT | Mod: 26,LT,, | Performed by: RADIOLOGY

## 2024-04-25 PROCEDURE — 99214 OFFICE O/P EST MOD 30 MIN: CPT | Mod: S$GLB,,, | Performed by: FAMILY MEDICINE

## 2024-04-25 PROCEDURE — 3074F SYST BP LT 130 MM HG: CPT | Mod: CPTII,S$GLB,, | Performed by: FAMILY MEDICINE

## 2024-04-25 RX ORDER — ACETAMINOPHEN AND CODEINE PHOSPHATE 300; 30 MG/1; MG/1
1 TABLET ORAL 2 TIMES DAILY PRN
Qty: 20 TABLET | Refills: 0 | Status: SHIPPED | OUTPATIENT
Start: 2024-04-25 | End: 2024-05-30

## 2024-04-25 NOTE — PROGRESS NOTES
Subjective:       Patient ID: Cassandra Campos is a 73 y.o. female.    Chief Complaint: Follow-up    Follow-up hypertension diabetes elevated BMI SVT paroxysmal panic attacks.  She is scheduled for thoracic lung surgery with a diagnosis of lung cancer.  She denies chest pain palpitations shortness breath or edema.  She continues on Ozempic injections with no side effects.  She denies polyuria polydipsia hypoglycemic symptoms  Her weight initially was 194 now 174.  She is on Lunesta for insomnia and lorazepam for panic attacks.  Additionally she fell several days ago landing on her left knee.  She reports pain of the knee.  She is requesting Percocet for pain.      Follow-up  Associated symptoms include coughing. Pertinent negatives include no abdominal pain, chest pain, chills, fever, headaches, nausea or weakness.     Review of Systems   Constitutional:  Negative for chills, fever and unexpected weight change.   Respiratory:  Positive for cough. Negative for chest tightness, shortness of breath and wheezing.    Cardiovascular:  Negative for chest pain, palpitations and leg swelling.   Gastrointestinal:  Negative for abdominal distention, abdominal pain, constipation, diarrhea and nausea.   Endocrine: Negative for polydipsia and polyuria.   Musculoskeletal:         Left knee pain secondary to recent fall   Neurological:  Negative for dizziness, weakness, light-headedness and headaches.       Objective:      Physical Exam  Constitutional:       General: She is not in acute distress.     Appearance: She is not ill-appearing or diaphoretic.   Cardiovascular:      Rate and Rhythm: Normal rate and regular rhythm.      Heart sounds: No murmur heard.     No gallop.   Pulmonary:      Effort: Pulmonary effort is normal. No respiratory distress.      Breath sounds: No wheezing, rhonchi or rales.   Abdominal:      General: There is no distension.   Musculoskeletal:      Comments: Mild swelling tenderness of the left knee  especially anterior in the region of patella.  No effusion and full range of motion   Lymphadenopathy:      Cervical: No cervical adenopathy.   Skin:     General: Skin is warm and dry.   Neurological:      Mental Status: She is alert.   Psychiatric:         Mood and Affect: Mood normal.         Behavior: Behavior normal.         Hospital Outpatient Visit on 04/22/2024   Component Date Value Ref Range Status    QRS Duration 04/22/2024 86  ms Final    [facility] QTC Calculation 04/22/2024 438  ms Final     Assessment:       1. Fall, initial encounter    2. Controlled type 2 diabetes mellitus without complication, with long-term current use of insulin    3. Insomnia, unspecified type    4. Paroxysmal SVT (supraventricular tachycardia)    5. Chronic bronchitis, unspecified chronic bronchitis type    6. Anxiety    7. Panic attack as reaction to stress    8. Cough, unspecified type    9. Primary hypertension    10. Malignant neoplasm of lower lobe of left lung        Plan:   Blood pressure controlled she is scheduled for surgery with diagnosis of lung cancer.  She has a stable chronic cough.  She is using lorazepam for anxiety and panic attacks.  Also Lunesta for sleep.  She has a history of paroxysmal SVT with no current palpitations.  She is requesting Percocet for knee pain.  Knee was x-rayed.  Will give low-dose  Codeine for knee pain.  Ice 3 times a day.  Hold Ozempic anticipating surgery.  Follow-up in 4 months.      Fall, initial encounter  -     X-Ray Knee 3 View Left; Future; Expected date: 04/25/2024    Controlled type 2 diabetes mellitus without complication, with long-term current use of insulin    Insomnia, unspecified type    Paroxysmal SVT (supraventricular tachycardia)    Chronic bronchitis, unspecified chronic bronchitis type    Anxiety    Panic attack as reaction to stress    Cough, unspecified type    Primary hypertension    Malignant neoplasm of lower lobe of left lung    Other orders  -      acetaminophen-codeine 300-30mg (TYLENOL #3) 300-30 mg Tab; Take 1 tablet by mouth 2 (two) times daily as needed (as needed).  Dispense: 20 tablet; Refill: 0

## 2024-04-30 ENCOUNTER — TELEPHONE (OUTPATIENT)
Dept: CARDIOLOGY | Facility: CLINIC | Age: 74
End: 2024-04-30
Payer: MEDICARE

## 2024-04-30 NOTE — TELEPHONE ENCOUNTER
Spoke with the patient to get her appointments rescheduled to the [facility] location at the patients request

## 2024-05-01 ENCOUNTER — HOSPITAL ENCOUNTER (OUTPATIENT)
Dept: CARDIOLOGY | Facility: HOSPITAL | Age: 74
Discharge: HOME | End: 2024-05-01
Attending: INTERNAL MEDICINE
Payer: MEDICARE

## 2024-05-01 VITALS
HEIGHT: 63 IN | WEIGHT: 174 LBS | BODY MASS INDEX: 30.83 KG/M2 | DIASTOLIC BLOOD PRESSURE: 68 MMHG | SYSTOLIC BLOOD PRESSURE: 106 MMHG

## 2024-05-01 VITALS
BODY MASS INDEX: 30.83 KG/M2 | SYSTOLIC BLOOD PRESSURE: 118 MMHG | WEIGHT: 174 LBS | HEIGHT: 63 IN | DIASTOLIC BLOOD PRESSURE: 70 MMHG

## 2024-05-01 DIAGNOSIS — I77.89 OTHER SPECIFIED DISORDERS OF ARTERIES AND ARTERIOLES: ICD-10-CM

## 2024-05-01 DIAGNOSIS — Z01.810 PREOP CARDIOVASCULAR EXAM: ICD-10-CM

## 2024-05-01 DIAGNOSIS — I70.0 CALCIFICATION OF AORTA: ICD-10-CM

## 2024-05-01 LAB
LEFT ARM DIASTOLIC BLOOD PRESSURE: 74 MMHG
LEFT ARM SYSTOLIC BLOOD PRESSURE: 114 MMHG
LEFT CBA DIAS: 16 CM/S
LEFT CBA SYS: 64 CM/S
LEFT CCA DIST DIAS: 20 CM/S
LEFT CCA DIST SYS: 70 CM/S
LEFT CCA MID DIAS: 16 CM/S
LEFT CCA MID SYS: 73 CM/S
LEFT CCA PROX DIAS: 12 CM/S
LEFT CCA PROX SYS: 62 CM/S
LEFT ECA DIAS: 13 CM/S
LEFT ECA SYS: 61 CM/S
LEFT ICA DIST DIAS: 12 CM/S
LEFT ICA DIST SYS: 48 CM/S
LEFT ICA MID DIAS: 18 CM/S
LEFT ICA MID SYS: 59 CM/S
LEFT ICA PROX DIAS: 18 CM/S
LEFT ICA PROX SYS: 77 CM/S
LEFT VERTEBRAL DIAS: 12 CM/S
LEFT VERTEBRAL SYS: 45 CM/S
OHS CV CAROTID RIGHT ICA EDV HIGHEST: 19
OHS CV CAROTID ULTRASOUND LEFT ICA/CCA RATIO: 1.1
OHS CV CAROTID ULTRASOUND RIGHT ICA/CCA RATIO: 1.43
OHS CV PV CAROTID LEFT HIGHEST CCA: 73
OHS CV PV CAROTID LEFT HIGHEST ICA: 77
OHS CV PV CAROTID RIGHT HIGHEST CCA: 74
OHS CV PV CAROTID RIGHT HIGHEST ICA: 73
OHS CV US CAROTID LEFT HIGHEST EDV: 18
RIGHT ARM DIASTOLIC BLOOD PRESSURE: 68 MMHG
RIGHT ARM SYSTOLIC BLOOD PRESSURE: 106 MMHG
RIGHT CBA DIAS: 7 CM/S
RIGHT CBA SYS: 39 CM/S
RIGHT CCA DIST DIAS: 11 CM/S
RIGHT CCA DIST SYS: 51 CM/S
RIGHT CCA MID DIAS: 12 CM/S
RIGHT CCA MID SYS: 65 CM/S
RIGHT CCA PROX DIAS: 12 CM/S
RIGHT CCA PROX SYS: 74 CM/S
RIGHT ECA DIAS: 10 CM/S
RIGHT ECA SYS: 50 CM/S
RIGHT ICA DIST DIAS: 19 CM/S
RIGHT ICA DIST SYS: 73 CM/S
RIGHT ICA MID DIAS: 19 CM/S
RIGHT ICA MID SYS: 69 CM/S
RIGHT ICA PROX DIAS: 12 CM/S
RIGHT ICA PROX SYS: 38 CM/S
RIGHT VERTEBRAL DIAS: 9 CM/S
RIGHT VERTEBRAL SYS: 41 CM/S

## 2024-05-01 PROCEDURE — 93880 EXTRACRANIAL BILAT STUDY: CPT | Mod: 26,,, | Performed by: INTERNAL MEDICINE

## 2024-05-01 PROCEDURE — 93880 EXTRACRANIAL BILAT STUDY: CPT

## 2024-05-01 PROCEDURE — 93306 TTE W/DOPPLER COMPLETE: CPT

## 2024-05-01 PROCEDURE — 93306 TTE W/DOPPLER COMPLETE: CPT | Mod: 26,,, | Performed by: INTERNAL MEDICINE

## 2024-05-02 ENCOUNTER — TELEPHONE (OUTPATIENT)
Dept: PREADMISSION TESTING | Facility: HOSPITAL | Age: 74
End: 2024-05-02
Payer: MEDICARE

## 2024-05-02 ENCOUNTER — PATIENT MESSAGE (OUTPATIENT)
Dept: PREADMISSION TESTING | Facility: HOSPITAL | Age: 74
End: 2024-05-02
Payer: MEDICARE

## 2024-05-02 ENCOUNTER — E-CONSULT (OUTPATIENT)
Dept: CARDIOLOGY | Facility: CLINIC | Age: 74
End: 2024-05-02
Payer: MEDICARE

## 2024-05-02 ENCOUNTER — TELEPHONE (OUTPATIENT)
Dept: CARDIOLOGY | Facility: CLINIC | Age: 74
End: 2024-05-02
Payer: MEDICARE

## 2024-05-02 DIAGNOSIS — Z01.810 PREOP CARDIOVASCULAR EXAM: Primary | ICD-10-CM

## 2024-05-02 LAB
AORTIC ROOT ANNULUS: 2.97 CM
ASCENDING AORTA: 3.21 CM
AV INDEX (PROSTH): 0.74
AV MEAN GRADIENT: 3 MMHG
AV PEAK GRADIENT: 7 MMHG
AV VALVE AREA BY VELOCITY RATIO: 2.64 CM²
AV VALVE AREA: 2.22 CM²
AV VELOCITY RATIO: 0.88
BSA FOR ECHO PROCEDURE: 1.87 M2
CV ECHO LV RWT: 0.61 CM
DOP CALC AO PEAK VEL: 1.28 M/S
DOP CALC AO VTI: 23.3 CM
DOP CALC LVOT AREA: 3 CM2
DOP CALC LVOT DIAMETER: 1.95 CM
DOP CALC LVOT PEAK VEL: 1.13 M/S
DOP CALC LVOT STROKE VOLUME: 51.64 CM3
DOP CALC RVOT PEAK VEL: 0.82 M/S
DOP CALC RVOT VTI: 13.4 CM
DOP CALCLVOT PEAK VEL VTI: 17.3 CM
E WAVE DECELERATION TIME: 222.1 MSEC
E/A RATIO: 0.54
E/E' RATIO: 6.12 M/S
ECHO LV POSTERIOR WALL: 1.09 CM (ref 0.6–1.1)
EJECTION FRACTION: 55 %
FRACTIONAL SHORTENING: 28 % (ref 28–44)
INTERVENTRICULAR SEPTUM: 1.28 CM (ref 0.6–1.1)
IVRT: 88.49 MSEC
LA MAJOR: 4.99 CM
LA MINOR: 5.17 CM
LA WIDTH: 3.5 CM
LEFT ATRIUM SIZE: 3.01 CM
LEFT ATRIUM VOLUME INDEX MOD: 17.4 ML/M2
LEFT ATRIUM VOLUME INDEX: 25 ML/M2
LEFT ATRIUM VOLUME MOD: 31.61 CM3
LEFT ATRIUM VOLUME: 45.48 CM3
LEFT INTERNAL DIMENSION IN SYSTOLE: 2.54 CM (ref 2.1–4)
LEFT VENTRICLE DIASTOLIC VOLUME INDEX: 28.97 ML/M2
LEFT VENTRICLE DIASTOLIC VOLUME: 52.72 ML
LEFT VENTRICLE MASS INDEX: 75 G/M2
LEFT VENTRICLE SYSTOLIC VOLUME INDEX: 12.8 ML/M2
LEFT VENTRICLE SYSTOLIC VOLUME: 23.24 ML
LEFT VENTRICULAR INTERNAL DIMENSION IN DIASTOLE: 3.55 CM (ref 3.5–6)
LEFT VENTRICULAR MASS: 136 G
LV LATERAL E/E' RATIO: 5.78 M/S
LV SEPTAL E/E' RATIO: 6.5 M/S
LVOT MG: 2.71 MMHG
LVOT MV: 0.78 CM/S
MV PEAK A VEL: 0.97 M/S
MV PEAK E VEL: 0.52 M/S
MV STENOSIS PRESSURE HALF TIME: 64.41 MS
MV VALVE AREA P 1/2 METHOD: 3.42 CM2
OHS CV RV/LV RATIO: 0.99 CM
PISA TR MAX VEL: 2.57 M/S
PV MEAN GRADIENT: 1 MMHG
PV MV: 0.59 M/S
PV PEAK GRADIENT: 3 MMHG
PV PEAK VELOCITY: 0.91 M/S
RA MAJOR: 5.29 CM
RA PRESSURE ESTIMATED: 3 MMHG
RA WIDTH: 3.4 CM
RIGHT VENTRICULAR END-DIASTOLIC DIMENSION: 3.52 CM
RV TB RVSP: 6 MMHG
SINUS: 2.98 CM
STJ: 3.1 CM
TDI LATERAL: 0.09 M/S
TDI SEPTAL: 0.08 M/S
TDI: 0.09 M/S
TR MAX PG: 26 MMHG
TRICUSPID ANNULAR PLANE SYSTOLIC EXCURSION: 2.22 CM
TV REST PULMONARY ARTERY PRESSURE: 29 MMHG
Z-SCORE OF LEFT VENTRICULAR DIMENSION IN END DIASTOLE: -3.5
Z-SCORE OF LEFT VENTRICULAR DIMENSION IN END SYSTOLE: -1.61

## 2024-05-02 PROCEDURE — 99499 UNLISTED E&M SERVICE: CPT | Mod: S$GLB,,, | Performed by: INTERNAL MEDICINE

## 2024-05-02 NOTE — TELEPHONE ENCOUNTER
Pt was contacted about results:     Carotid US showed mild Dz   Echo showed normal function   The procedure is cleared     Pt verbalized understanding with no questions or concerns.      ----- Message from Twan Pelaez MD sent at 5/2/2024  4:35 PM CDT -----  Carotid US showed mild Dz  Echo showed normal function   The procedure is cleared

## 2024-05-03 NOTE — CONSULTS
The [facility]  Response for E-Consult     Patient Name: Cassandra Campos  MRN: [id number]  Primary Care Provider: Emil Zhang MD   Requesting Provider: Elise Lorenzo NP  E-Consult to Cardiology  Consult performed by: Twan Pelaez MD  Consult ordered by: Elise Lorenzo NP          Please see my note done on 04/22/24      Total time of Consultation: 5 minute    I did not speak to the requesting provider verbally about this.     *This eConsult is based on the clinical data available to me and is furnished without benefit of a physical examination. The eConsult will need to be interpreted in light of any clinical issues or changes in patient status not available to me at the time of filing this eConsults. Significant changes in patient condition or level of acuity should result in immediate formal consultation and reevaluation. Please alert me if you have further questions.    Thank you for this eConsult referral.     Twan Pelaez MD  The [address]

## 2024-05-03 NOTE — TELEPHONE ENCOUNTER
Pre op instructions reviewed with Pt over telephone, verbalized understanding.    To confirm, Surgery is scheduled on 5/10/24. We will call you late afternoon the business day prior to surgery with your arrival time.    *Please report to the [facility] Lobby (1st Floor) located off of [facility] (2nd Entrance/Building on the left, in front of the flag pole).  Address: [address] [place] [address] [place] [address]    Your Type & Screen appointment is scheduled on 5/08/24 @ [facility] ([facility]). Please DO NOT remove the red arm band applied.      INSTRUCTIONS IMPORTANT!!!  DO NOT Eat, Drink, or Smoke after 12 midnight unless instructed otherwise by your Surgeon. OK to brush teeth, no gum, candy or mints!    >>>MEDICATION INSTRUCTIONS<<<: Morning of Surgery, take small sip of water with ONLY these medications:  NONE    *Diabetic/ Prediabetic Patients: !!!If you take diabetic or weight loss medication, Do NOT take morning of surgery unless instructed by Doctor!!!  Metformin to be stopped 24 hrs prior to surgery.   Long Acting Insulin Instructions: HOLD the night before surgery unless instructed differently by Provider!  Ozempic/ Mounjaro/ Wegovy/ Trulicity/ Semaglutide injections or weight loss medication to be stopped 7 days prior to surgery.    !!!STOP ALL Aspirins, NSAIDS, WEIGHT LOSS INJECTIONS/PILLS, Herbal supplements, & Vitamins 7 DAYS BEFORE SURGERY!!!    ____  Avoid Alcoholic beverages 3 days prior to surgery, as it can thin the blood.  ____  NO Acrylic/fake nails or nail polish worn day of surgery (specifically hand/arm & foot surgeries).  ____  NO powder, lotions, deodorants, oils or cream on body.  ____  Remove all jewelry & piercings & foreign objects before arrival & leave at home.  ____  Remove Dentures, Hearing Aids & Contact Lens prior to surgery.  ____  Bring photo ID and insurance information to hospital (Leave Valuables at Home).  ____  If going home the same day, arrange for a ride  home. You will not be able to drive for 24 hrs if Anesthesia was used.   ____  Females (ages 11-60): may need to give a urine sample the morning of surgery; please see Pre op Nurse prior to using the restroom.  ____  Males: Stop ED medications (Viagra, Cialis) 24 hrs prior to surgery.  ____  Wear clean, loose fitting clothing to allow for dressings/ bandages.      Bathing Instructions:    -Shower with anti-bacterial Soap (Hibiclens or Dial) the night before surgery and the morning of.   -Do not use Hibiclens on your face or genitals.   -Apply clean clothes after shower.  -Do not shave your face or body 2 days prior to surgery unless instructed otherwise by your Surgeon.  -Do not shave pubic hair 7 days prior to surgery (gyn pt's).    [facility] Visitor/Ride Policy:  Only 2 adults allowed in pre op/recovery area during your procedure. You MUST HAVE A RIDE HOME from a responsible adult that you know and trust. Medical Transport, Uber or Lyft can ONLY be used if patient has a responsible adult to accompany them during ride home.       *Signs and symptoms of Infection Before or After Surgery:               !!!If you experience any fever, chills, nausea/ vomiting, foul odor/ excessive drainage from surgical site, flu-like symptoms, new wounds or cuts, PLEASE CALL THE SURGEON OFFICE at [phone] or SEND MESSAGE THROUGH [employer] PORTAL!!!     *If you are running late the morning of surgery, please call the Hospital Surgery Dept @ [phone].     *Billing questions:  [phone]     Thank you,  -[facility] Pre Admit Dept.  [phone] or [phone]  M-F 7:30 am-4:00 pm (Closed Major Holidays)

## 2024-05-06 ENCOUNTER — HOSPITAL ENCOUNTER (OUTPATIENT)
Dept: RADIOLOGY | Facility: HOSPITAL | Age: 74
Discharge: HOME | End: 2024-05-06
Attending: FAMILY MEDICINE
Payer: MEDICARE

## 2024-05-06 DIAGNOSIS — Z12.31 ENCOUNTER FOR SCREENING MAMMOGRAM FOR MALIGNANT NEOPLASM OF BREAST: ICD-10-CM

## 2024-05-06 PROCEDURE — 77067 SCR MAMMO BI INCL CAD: CPT | Mod: TC

## 2024-05-06 PROCEDURE — 77063 BREAST TOMOSYNTHESIS BI: CPT | Mod: 26,,, | Performed by: RADIOLOGY

## 2024-05-06 PROCEDURE — 77067 SCR MAMMO BI INCL CAD: CPT | Mod: 26,,, | Performed by: RADIOLOGY

## 2024-05-08 DIAGNOSIS — E11.9 CONTROLLED TYPE 2 DIABETES MELLITUS WITHOUT COMPLICATION, WITHOUT LONG-TERM CURRENT USE OF INSULIN: ICD-10-CM

## 2024-05-08 RX ORDER — SEMAGLUTIDE 2.68 MG/ML
2 INJECTION, SOLUTION SUBCUTANEOUS
Qty: 3 ML | Refills: 2 | Status: SHIPPED | OUTPATIENT
Start: 2024-05-08 | End: 2024-06-05 | Stop reason: SDUPTHER

## 2024-05-08 NOTE — TELEPHONE ENCOUNTER
No care due was identified.  [facility] Embedded Care Due Messages. Reference number: [id number].   5/08/2024 10:57:16 AM CDT

## 2024-05-09 ENCOUNTER — TELEPHONE (OUTPATIENT)
Dept: CARDIOLOGY | Facility: CLINIC | Age: 74
End: 2024-05-09
Payer: MEDICARE

## 2024-05-09 ENCOUNTER — TELEPHONE (OUTPATIENT)
Dept: PREADMISSION TESTING | Facility: HOSPITAL | Age: 74
End: 2024-05-09
Payer: MEDICARE

## 2024-05-09 ENCOUNTER — ANESTHESIA EVENT (OUTPATIENT)
Dept: SURGERY | Facility: HOSPITAL | Age: 74
DRG: 164 | End: 2024-05-09
Payer: MEDICARE

## 2024-05-09 LAB
DNA RANGE(S) EXAMINED NAR: NORMAL
GENE DIS ANL INTERP-IMP: NORMAL
GENE DIS ASSESSED: NORMAL
PD-L1 BY 22C3 TISS IMSTN DOC: POSITIVE
REASON FOR STUDY: NORMAL
TEMPUS PD-L1 (22C3) COMBINED POSITIVE SCORE: 35
TEMPUS PD-L1 (22C3) TUMOR PROPORTION SCORE: 35 %
TEMPUS PORTAL: NORMAL

## 2024-05-09 NOTE — TELEPHONE ENCOUNTER
Called and spoke with Pt about the following:     Please arrive to [facility] ([facility]) at 9:30 am on 5/10/24 for your scheduled procedure.  Address: [address] [place] [address] [place]. [address] (2nd Building on left, 1st Floor Lobby)  >>>NO eating or drinking after midnight unless instructed otherwise by your Surgeon<<<

## 2024-05-09 NOTE — TELEPHONE ENCOUNTER
Looks to be scheduled for 11am but informed pt that preservice should be calling her with more information          ----- Message from Lizeth Dalal sent at 5/9/2024 10:33 AM CDT -----  Contact: Cassandra  Patient is calling top see what time is her appointment scheduled for. Patient is nervous and need a callback [phone]

## 2024-05-10 ENCOUNTER — TELEPHONE (OUTPATIENT)
Dept: SLEEP MEDICINE | Facility: CLINIC | Age: 74
End: 2024-05-10
Payer: MEDICARE

## 2024-05-10 ENCOUNTER — ANESTHESIA (OUTPATIENT)
Dept: SURGERY | Facility: HOSPITAL | Age: 74
DRG: 164 | End: 2024-05-10
Payer: MEDICARE

## 2024-05-10 ENCOUNTER — HOSPITAL ENCOUNTER (INPATIENT)
Facility: HOSPITAL | Age: 74
LOS: 5 days | Discharge: HOME HEALTH | DRG: 164 | End: 2024-05-15
Attending: THORACIC SURGERY (CARDIOTHORACIC VASCULAR SURGERY) | Admitting: THORACIC SURGERY (CARDIOTHORACIC VASCULAR SURGERY)
Payer: MEDICARE

## 2024-05-10 DIAGNOSIS — I48.92 ATRIAL FLUTTER: ICD-10-CM

## 2024-05-10 DIAGNOSIS — R00.0 TACHYCARDIA: ICD-10-CM

## 2024-05-10 DIAGNOSIS — I48.91 ATRIAL FIBRILLATION, UNSPECIFIED TYPE: ICD-10-CM

## 2024-05-10 DIAGNOSIS — I15.9 SECONDARY HYPERTENSION: ICD-10-CM

## 2024-05-10 DIAGNOSIS — G56.03 BILATERAL CARPAL TUNNEL SYNDROME: ICD-10-CM

## 2024-05-10 DIAGNOSIS — R59.0 HILAR ADENOPATHY: ICD-10-CM

## 2024-05-10 DIAGNOSIS — R91.1 SOLITARY PULMONARY NODULE: Primary | ICD-10-CM

## 2024-05-10 DIAGNOSIS — C34.32 MALIGNANT NEOPLASM OF LOWER LOBE OF LEFT LUNG: ICD-10-CM

## 2024-05-10 DIAGNOSIS — I48.91 AF (ATRIAL FIBRILLATION): ICD-10-CM

## 2024-05-10 DIAGNOSIS — I48.3 TYPICAL ATRIAL FLUTTER: ICD-10-CM

## 2024-05-10 DIAGNOSIS — J42 CHRONIC BRONCHITIS, UNSPECIFIED CHRONIC BRONCHITIS TYPE: ICD-10-CM

## 2024-05-10 LAB
ANION GAP SERPL CALC-SCNC: 10 MMOL/L (ref 8–16)
BUN SERPL-MCNC: 9 MG/DL (ref 8–23)
CALCIUM SERPL-MCNC: 9.2 MG/DL (ref 8.7–10.5)
CHLORIDE SERPL-SCNC: 105 MMOL/L (ref 95–110)
CO2 SERPL-SCNC: 24 MMOL/L (ref 23–29)
CREAT SERPL-MCNC: 0.6 MG/DL (ref 0.5–1.4)
EST. GFR  (NO RACE VARIABLE): >60 ML/MIN/1.73 M^2
GLUCOSE SERPL-MCNC: 156 MG/DL (ref 70–110)
POCT GLUCOSE: 115 MG/DL (ref 70–110)
POCT GLUCOSE: 161 MG/DL (ref 70–110)
POCT GLUCOSE: 91 MG/DL (ref 70–110)
POTASSIUM SERPL-SCNC: 3.9 MMOL/L (ref 3.5–5.1)
SODIUM SERPL-SCNC: 139 MMOL/L (ref 136–145)

## 2024-05-10 PROCEDURE — 63600175 PHARM REV CODE 636 W HCPCS: Performed by: THORACIC SURGERY (CARDIOTHORACIC VASCULAR SURGERY)

## 2024-05-10 PROCEDURE — 37000008 HC ANESTHESIA 1ST 15 MINUTES: Performed by: THORACIC SURGERY (CARDIOTHORACIC VASCULAR SURGERY)

## 2024-05-10 PROCEDURE — 32674 THORACOSCOPY LYMPH NODE EXC: CPT | Mod: ,,, | Performed by: THORACIC SURGERY (CARDIOTHORACIC VASCULAR SURGERY)

## 2024-05-10 PROCEDURE — 27000221 HC OXYGEN, UP TO 24 HOURS

## 2024-05-10 PROCEDURE — 37000009 HC ANESTHESIA EA ADD 15 MINS: Performed by: THORACIC SURGERY (CARDIOTHORACIC VASCULAR SURGERY)

## 2024-05-10 PROCEDURE — 36000712 HC OR TIME LEV V 1ST 15 MIN: Performed by: THORACIC SURGERY (CARDIOTHORACIC VASCULAR SURGERY)

## 2024-05-10 PROCEDURE — 71000039 HC RECOVERY, EACH ADD'L HOUR: Performed by: THORACIC SURGERY (CARDIOTHORACIC VASCULAR SURGERY)

## 2024-05-10 PROCEDURE — 94761 N-INVAS EAR/PLS OXIMETRY MLT: CPT

## 2024-05-10 PROCEDURE — 36000713 HC OR TIME LEV V EA ADD 15 MIN: Performed by: THORACIC SURGERY (CARDIOTHORACIC VASCULAR SURGERY)

## 2024-05-10 PROCEDURE — 25000003 PHARM REV CODE 250: Performed by: THORACIC SURGERY (CARDIOTHORACIC VASCULAR SURGERY)

## 2024-05-10 PROCEDURE — 63600175 PHARM REV CODE 636 W HCPCS: Performed by: ANESTHESIOLOGY

## 2024-05-10 PROCEDURE — 63600175 PHARM REV CODE 636 W HCPCS

## 2024-05-10 PROCEDURE — 82962 GLUCOSE BLOOD TEST: CPT | Performed by: THORACIC SURGERY (CARDIOTHORACIC VASCULAR SURGERY)

## 2024-05-10 PROCEDURE — 94640 AIRWAY INHALATION TREATMENT: CPT

## 2024-05-10 PROCEDURE — 0BBH4ZZ EXCISION OF LUNG LINGULA, PERCUTANEOUS ENDOSCOPIC APPROACH: ICD-10-PCS | Performed by: THORACIC SURGERY (CARDIOTHORACIC VASCULAR SURGERY)

## 2024-05-10 PROCEDURE — 71000033 HC RECOVERY, INTIAL HOUR: Performed by: THORACIC SURGERY (CARDIOTHORACIC VASCULAR SURGERY)

## 2024-05-10 PROCEDURE — 99900035 HC TECH TIME PER 15 MIN (STAT)

## 2024-05-10 PROCEDURE — 27201423 OPTIME MED/SURG SUP & DEVICES STERILE SUPPLY: Performed by: THORACIC SURGERY (CARDIOTHORACIC VASCULAR SURGERY)

## 2024-05-10 PROCEDURE — 94799 UNLISTED PULMONARY SVC/PX: CPT

## 2024-05-10 PROCEDURE — 25000003 PHARM REV CODE 250

## 2024-05-10 PROCEDURE — C1729 CATH, DRAINAGE: HCPCS | Performed by: THORACIC SURGERY (CARDIOTHORACIC VASCULAR SURGERY)

## 2024-05-10 PROCEDURE — 32669 THORACOSCOPY REMOVE SEGMENT: CPT | Mod: LT,,, | Performed by: THORACIC SURGERY (CARDIOTHORACIC VASCULAR SURGERY)

## 2024-05-10 PROCEDURE — C9290 INJ, BUPIVACAINE LIPOSOME: HCPCS | Performed by: THORACIC SURGERY (CARDIOTHORACIC VASCULAR SURGERY)

## 2024-05-10 PROCEDURE — 07B74ZZ EXCISION OF THORAX LYMPHATIC, PERCUTANEOUS ENDOSCOPIC APPROACH: ICD-10-PCS | Performed by: THORACIC SURGERY (CARDIOTHORACIC VASCULAR SURGERY)

## 2024-05-10 PROCEDURE — 36415 COLL VENOUS BLD VENIPUNCTURE: CPT | Performed by: THORACIC SURGERY (CARDIOTHORACIC VASCULAR SURGERY)

## 2024-05-10 PROCEDURE — 80048 BASIC METABOLIC PNL TOTAL CA: CPT | Performed by: THORACIC SURGERY (CARDIOTHORACIC VASCULAR SURGERY)

## 2024-05-10 PROCEDURE — 8E0W4CZ ROBOTIC ASSISTED PROCEDURE OF TRUNK REGION, PERCUTANEOUS ENDOSCOPIC APPROACH: ICD-10-PCS | Performed by: THORACIC SURGERY (CARDIOTHORACIC VASCULAR SURGERY)

## 2024-05-10 PROCEDURE — 21400001 HC TELEMETRY ROOM

## 2024-05-10 RX ORDER — ONDANSETRON 8 MG/1
8 TABLET, ORALLY DISINTEGRATING ORAL EVERY 8 HOURS PRN
Status: DISCONTINUED | OUTPATIENT
Start: 2024-05-10 | End: 2024-05-15 | Stop reason: HOSPADM

## 2024-05-10 RX ORDER — ENOXAPARIN SODIUM 100 MG/ML
40 INJECTION SUBCUTANEOUS EVERY 24 HOURS
Status: DISCONTINUED | OUTPATIENT
Start: 2024-05-10 | End: 2024-05-14

## 2024-05-10 RX ORDER — ONDANSETRON HYDROCHLORIDE 2 MG/ML
4 INJECTION, SOLUTION INTRAVENOUS DAILY PRN
Status: DISCONTINUED | OUTPATIENT
Start: 2024-05-10 | End: 2024-05-10 | Stop reason: HOSPADM

## 2024-05-10 RX ORDER — DEXTROSE MONOHYDRATE, SODIUM CHLORIDE, AND POTASSIUM CHLORIDE 50; 1.49; 4.5 G/1000ML; G/1000ML; G/1000ML
INJECTION, SOLUTION INTRAVENOUS CONTINUOUS
Status: DISCONTINUED | OUTPATIENT
Start: 2024-05-10 | End: 2024-05-15 | Stop reason: HOSPADM

## 2024-05-10 RX ORDER — IBUPROFEN 200 MG
24 TABLET ORAL
Status: DISCONTINUED | OUTPATIENT
Start: 2024-05-10 | End: 2024-05-15 | Stop reason: HOSPADM

## 2024-05-10 RX ORDER — POLYETHYLENE GLYCOL 3350 17 G/17G
17 POWDER, FOR SOLUTION ORAL DAILY
Status: DISCONTINUED | OUTPATIENT
Start: 2024-05-11 | End: 2024-05-15 | Stop reason: HOSPADM

## 2024-05-10 RX ORDER — CEFAZOLIN SODIUM 1 G/3ML
INJECTION, POWDER, FOR SOLUTION INTRAMUSCULAR; INTRAVENOUS
Status: DISCONTINUED | OUTPATIENT
Start: 2024-05-10 | End: 2024-05-10

## 2024-05-10 RX ORDER — FENTANYL CITRATE 50 UG/ML
25 INJECTION, SOLUTION INTRAMUSCULAR; INTRAVENOUS EVERY 5 MIN PRN
Status: DISCONTINUED | OUTPATIENT
Start: 2024-05-10 | End: 2024-05-10 | Stop reason: HOSPADM

## 2024-05-10 RX ORDER — MORPHINE SULFATE 4 MG/ML
4 INJECTION, SOLUTION INTRAMUSCULAR; INTRAVENOUS EVERY 4 HOURS PRN
Status: DISCONTINUED | OUTPATIENT
Start: 2024-05-10 | End: 2024-05-15 | Stop reason: HOSPADM

## 2024-05-10 RX ORDER — OXYCODONE AND ACETAMINOPHEN 5; 325 MG/1; MG/1
1 TABLET ORAL
Status: DISCONTINUED | OUTPATIENT
Start: 2024-05-10 | End: 2024-05-10 | Stop reason: HOSPADM

## 2024-05-10 RX ORDER — OXYCODONE HYDROCHLORIDE 5 MG/1
5 TABLET ORAL EVERY 4 HOURS PRN
Status: DISCONTINUED | OUTPATIENT
Start: 2024-05-10 | End: 2024-05-11

## 2024-05-10 RX ORDER — FENTANYL CITRATE 50 UG/ML
INJECTION, SOLUTION INTRAMUSCULAR; INTRAVENOUS
Status: DISCONTINUED | OUTPATIENT
Start: 2024-05-10 | End: 2024-05-10

## 2024-05-10 RX ORDER — METOPROLOL TARTRATE 25 MG/1
25 TABLET, FILM COATED ORAL 2 TIMES DAILY
Status: DISCONTINUED | OUTPATIENT
Start: 2024-05-10 | End: 2024-05-12

## 2024-05-10 RX ORDER — PHENYLEPHRINE HYDROCHLORIDE 10 MG/ML
INJECTION INTRAVENOUS
Status: DISCONTINUED | OUTPATIENT
Start: 2024-05-10 | End: 2024-05-10

## 2024-05-10 RX ORDER — METOCLOPRAMIDE HYDROCHLORIDE 5 MG/ML
5 INJECTION INTRAMUSCULAR; INTRAVENOUS EVERY 6 HOURS PRN
Status: DISCONTINUED | OUTPATIENT
Start: 2024-05-10 | End: 2024-05-15 | Stop reason: HOSPADM

## 2024-05-10 RX ORDER — ONDANSETRON HYDROCHLORIDE 2 MG/ML
INJECTION, SOLUTION INTRAVENOUS
Status: DISCONTINUED | OUTPATIENT
Start: 2024-05-10 | End: 2024-05-10

## 2024-05-10 RX ORDER — ONDANSETRON HYDROCHLORIDE 2 MG/ML
4 INJECTION, SOLUTION INTRAVENOUS ONCE AS NEEDED
Status: DISCONTINUED | OUTPATIENT
Start: 2024-05-10 | End: 2024-05-10 | Stop reason: HOSPADM

## 2024-05-10 RX ORDER — GLUCAGON 1 MG
1 KIT INJECTION
Status: DISCONTINUED | OUTPATIENT
Start: 2024-05-10 | End: 2024-05-15 | Stop reason: HOSPADM

## 2024-05-10 RX ORDER — BUPIVACAINE HYDROCHLORIDE 2.5 MG/ML
INJECTION, SOLUTION EPIDURAL; INFILTRATION; INTRACAUDAL
Status: DISCONTINUED | OUTPATIENT
Start: 2024-05-10 | End: 2024-05-10 | Stop reason: HOSPADM

## 2024-05-10 RX ORDER — HYDROCODONE BITARTRATE AND ACETAMINOPHEN 500; 5 MG/1; MG/1
TABLET ORAL
Status: DISCONTINUED | OUTPATIENT
Start: 2024-05-10 | End: 2024-05-10 | Stop reason: HOSPADM

## 2024-05-10 RX ORDER — MEPERIDINE HYDROCHLORIDE 25 MG/ML
12.5 INJECTION INTRAMUSCULAR; INTRAVENOUS; SUBCUTANEOUS ONCE AS NEEDED
Status: DISCONTINUED | OUTPATIENT
Start: 2024-05-10 | End: 2024-05-10 | Stop reason: HOSPADM

## 2024-05-10 RX ORDER — POLYETHYLENE GLYCOL 3350 17 G/17G
17 POWDER, FOR SOLUTION ORAL DAILY
Status: DISCONTINUED | OUTPATIENT
Start: 2024-05-11 | End: 2024-05-10 | Stop reason: SDUPTHER

## 2024-05-10 RX ORDER — BISACODYL 10 MG/1
10 SUPPOSITORY RECTAL DAILY PRN
Status: DISCONTINUED | OUTPATIENT
Start: 2024-05-11 | End: 2024-05-15 | Stop reason: HOSPADM

## 2024-05-10 RX ORDER — ROCURONIUM BROMIDE 10 MG/ML
INJECTION, SOLUTION INTRAVENOUS
Status: DISCONTINUED | OUTPATIENT
Start: 2024-05-10 | End: 2024-05-10

## 2024-05-10 RX ORDER — IPRATROPIUM BROMIDE 0.5 MG/2.5ML
0.5 SOLUTION RESPIRATORY (INHALATION) EVERY 4 HOURS
Status: DISCONTINUED | OUTPATIENT
Start: 2024-05-11 | End: 2024-05-14

## 2024-05-10 RX ORDER — MUPIROCIN 20 MG/G
1 OINTMENT TOPICAL 2 TIMES DAILY
Status: COMPLETED | OUTPATIENT
Start: 2024-05-10 | End: 2024-05-15

## 2024-05-10 RX ORDER — HYDROMORPHONE HYDROCHLORIDE 2 MG/ML
0.2 INJECTION, SOLUTION INTRAMUSCULAR; INTRAVENOUS; SUBCUTANEOUS EVERY 5 MIN PRN
Status: DISCONTINUED | OUTPATIENT
Start: 2024-05-10 | End: 2024-05-10 | Stop reason: HOSPADM

## 2024-05-10 RX ORDER — LIDOCAINE HYDROCHLORIDE 10 MG/ML
INJECTION, SOLUTION EPIDURAL; INFILTRATION; INTRACAUDAL; PERINEURAL
Status: DISCONTINUED | OUTPATIENT
Start: 2024-05-10 | End: 2024-05-10

## 2024-05-10 RX ORDER — PROPOFOL 10 MG/ML
VIAL (ML) INTRAVENOUS
Status: DISCONTINUED | OUTPATIENT
Start: 2024-05-10 | End: 2024-05-10

## 2024-05-10 RX ORDER — IBUPROFEN 200 MG
16 TABLET ORAL
Status: DISCONTINUED | OUTPATIENT
Start: 2024-05-10 | End: 2024-05-15 | Stop reason: HOSPADM

## 2024-05-10 RX ORDER — MIDAZOLAM HYDROCHLORIDE 1 MG/ML
INJECTION INTRAMUSCULAR; INTRAVENOUS
Status: DISCONTINUED | OUTPATIENT
Start: 2024-05-10 | End: 2024-05-10

## 2024-05-10 RX ORDER — LORAZEPAM 1 MG/1
1 TABLET ORAL 2 TIMES DAILY
Status: DISCONTINUED | OUTPATIENT
Start: 2024-05-10 | End: 2024-05-12

## 2024-05-10 RX ORDER — FAMOTIDINE 20 MG/1
20 TABLET, FILM COATED ORAL 2 TIMES DAILY
Status: DISCONTINUED | OUTPATIENT
Start: 2024-05-10 | End: 2024-05-15 | Stop reason: HOSPADM

## 2024-05-10 RX ORDER — AMITRIPTYLINE HYDROCHLORIDE 50 MG/1
100 TABLET, FILM COATED ORAL NIGHTLY
Status: DISCONTINUED | OUTPATIENT
Start: 2024-05-10 | End: 2024-05-15 | Stop reason: HOSPADM

## 2024-05-10 RX ADMIN — PHENYLEPHRINE HYDROCHLORIDE 100 MCG: 10 INJECTION INTRAVENOUS at 01:05

## 2024-05-10 RX ADMIN — METOPROLOL TARTRATE 25 MG: 25 TABLET, FILM COATED ORAL at 09:05

## 2024-05-10 RX ADMIN — SODIUM CHLORIDE, SODIUM LACTATE, POTASSIUM CHLORIDE, AND CALCIUM CHLORIDE: .6; .31; .03; .02 INJECTION, SOLUTION INTRAVENOUS at 02:05

## 2024-05-10 RX ADMIN — SUGAMMADEX 200 MG: 100 INJECTION, SOLUTION INTRAVENOUS at 05:05

## 2024-05-10 RX ADMIN — HYDROMORPHONE HYDROCHLORIDE 0.2 MG: 2 INJECTION INTRAMUSCULAR; INTRAVENOUS; SUBCUTANEOUS at 07:05

## 2024-05-10 RX ADMIN — LIDOCAINE HYDROCHLORIDE 100 MG: 10 SOLUTION INTRAVENOUS at 11:05

## 2024-05-10 RX ADMIN — MIDAZOLAM HYDROCHLORIDE 2 MG: 1 INJECTION, SOLUTION INTRAMUSCULAR; INTRAVENOUS at 11:05

## 2024-05-10 RX ADMIN — CEFAZOLIN 2 G: 330 INJECTION, POWDER, FOR SOLUTION INTRAMUSCULAR; INTRAVENOUS at 12:05

## 2024-05-10 RX ADMIN — FENTANYL CITRATE 50 MCG: 50 INJECTION, SOLUTION INTRAMUSCULAR; INTRAVENOUS at 11:05

## 2024-05-10 RX ADMIN — ROCURONIUM BROMIDE 40 MG: 10 SOLUTION INTRAVENOUS at 11:05

## 2024-05-10 RX ADMIN — PROPOFOL 30 MG: 10 INJECTION, EMULSION INTRAVENOUS at 05:05

## 2024-05-10 RX ADMIN — HYDROMORPHONE HYDROCHLORIDE 0.2 MG: 2 INJECTION INTRAMUSCULAR; INTRAVENOUS; SUBCUTANEOUS at 06:05

## 2024-05-10 RX ADMIN — MUPIROCIN 1 G: 20 OINTMENT TOPICAL at 09:05

## 2024-05-10 RX ADMIN — ONDANSETRON 4 MG: 2 INJECTION INTRAMUSCULAR; INTRAVENOUS at 05:05

## 2024-05-10 RX ADMIN — ENOXAPARIN SODIUM 40 MG: 40 INJECTION SUBCUTANEOUS at 09:05

## 2024-05-10 RX ADMIN — FENTANYL CITRATE 50 MCG: 50 INJECTION, SOLUTION INTRAMUSCULAR; INTRAVENOUS at 02:05

## 2024-05-10 RX ADMIN — PROPOFOL 40 MG: 10 INJECTION, EMULSION INTRAVENOUS at 12:05

## 2024-05-10 RX ADMIN — FENTANYL CITRATE 25 MCG: 50 INJECTION, SOLUTION INTRAMUSCULAR; INTRAVENOUS at 05:05

## 2024-05-10 RX ADMIN — PROPOFOL 20 MG: 10 INJECTION, EMULSION INTRAVENOUS at 05:05

## 2024-05-10 RX ADMIN — LORAZEPAM 1 MG: 1 TABLET ORAL at 09:05

## 2024-05-10 RX ADMIN — ROCURONIUM BROMIDE 30 MG: 10 SOLUTION INTRAVENOUS at 01:05

## 2024-05-10 RX ADMIN — ROCURONIUM BROMIDE 20 MG: 10 SOLUTION INTRAVENOUS at 12:05

## 2024-05-10 RX ADMIN — FENTANYL CITRATE 50 MCG: 50 INJECTION, SOLUTION INTRAMUSCULAR; INTRAVENOUS at 12:05

## 2024-05-10 RX ADMIN — CEFAZOLIN 2 G: 2 INJECTION, POWDER, FOR SOLUTION INTRAMUSCULAR; INTRAVENOUS at 09:05

## 2024-05-10 RX ADMIN — SODIUM CHLORIDE, SODIUM LACTATE, POTASSIUM CHLORIDE, AND CALCIUM CHLORIDE: .6; .31; .03; .02 INJECTION, SOLUTION INTRAVENOUS at 11:05

## 2024-05-10 RX ADMIN — PHENYLEPHRINE HYDROCHLORIDE 50 MCG: 10 INJECTION INTRAVENOUS at 03:05

## 2024-05-10 RX ADMIN — CEFAZOLIN 2 G: 330 INJECTION, POWDER, FOR SOLUTION INTRAMUSCULAR; INTRAVENOUS at 04:05

## 2024-05-10 RX ADMIN — FENTANYL CITRATE 25 MCG: 50 INJECTION, SOLUTION INTRAMUSCULAR; INTRAVENOUS at 01:05

## 2024-05-10 RX ADMIN — ROCURONIUM BROMIDE 30 MG: 10 SOLUTION INTRAVENOUS at 02:05

## 2024-05-10 RX ADMIN — ROCURONIUM BROMIDE 10 MG: 10 SOLUTION INTRAVENOUS at 11:05

## 2024-05-10 RX ADMIN — FAMOTIDINE 20 MG: 20 TABLET ORAL at 09:05

## 2024-05-10 RX ADMIN — AMITRIPTYLINE HYDROCHLORIDE 100 MG: 50 TABLET, FILM COATED ORAL at 09:05

## 2024-05-10 RX ADMIN — PROPOFOL 50 MG: 10 INJECTION, EMULSION INTRAVENOUS at 11:05

## 2024-05-10 RX ADMIN — PROPOFOL 150 MG: 10 INJECTION, EMULSION INTRAVENOUS at 11:05

## 2024-05-10 RX ADMIN — ROCURONIUM BROMIDE 20 MG: 10 SOLUTION INTRAVENOUS at 03:05

## 2024-05-10 RX ADMIN — DEXTROSE, SODIUM CHLORIDE, AND POTASSIUM CHLORIDE: 5; .45; .15 INJECTION INTRAVENOUS at 09:05

## 2024-05-10 RX ADMIN — PROPOFOL 30 MG: 10 INJECTION, EMULSION INTRAVENOUS at 06:05

## 2024-05-10 RX ADMIN — PROPOFOL 50 MG: 10 INJECTION, EMULSION INTRAVENOUS at 12:05

## 2024-05-10 RX ADMIN — IPRATROPIUM BROMIDE 0.5 MG: 0.5 SOLUTION RESPIRATORY (INHALATION) at 11:05

## 2024-05-10 RX ADMIN — ONDANSETRON 4 MG: 2 INJECTION INTRAMUSCULAR; INTRAVENOUS at 12:05

## 2024-05-10 RX ADMIN — ROCURONIUM BROMIDE 20 MG: 10 SOLUTION INTRAVENOUS at 04:05

## 2024-05-10 NOTE — INTERVAL H&P NOTE
The patient has been examined and the H&P has been reviewed:    I concur with the findings and no changes have occurred since H&P was written.    Anesthesia/Surgery risks, benefits and alternative options discussed and understood by patient/family.      Discussed the robotic assisted left upper lobectomy lingual lobectomy mediastinal lymph node dissection with the patient.  She understands the risks and benefits and agreed to proceed.    There are no hospital problems to display for this patient.

## 2024-05-10 NOTE — TRANSFER OF CARE
"Anesthesia Transfer of Care Note    Patient: Cassandra Campos    Procedure(s) Performed: Procedure(s) (LRB):  XI ROBOTIC RATS,WITH LOBECTOMY,LUNG (Left)  EXCISION, LYMPH NODE (Left)  BLOCK, NERVE, INTERCOSTAL, 2 OR MORE    Patient location: PACU    Anesthesia Type: general    Transport from OR: Transported from OR on room air with adequate spontaneous ventilation    Post pain: adequate analgesia    Post assessment: no apparent anesthetic complications and tolerated procedure well    Post vital signs: stable    Level of consciousness: awake    Nausea/Vomiting: no nausea/vomiting    Complications: none    Transfer of care protocol was followed      Last vitals: Visit Vitals  /73 (BP Location: Left arm, Patient Position: Sitting)   Pulse 94   Temp 36.2 °C (97.2 °F) (Temporal)   Resp 20   Ht 5' 3" (1.6 m)   Wt 76.1 kg (167 lb 10.6 oz)   SpO2 96%   Breastfeeding No   BMI 29.70 kg/m²     "

## 2024-05-10 NOTE — TELEPHONE ENCOUNTER
PD-L1 Interpretation by 22C3  positive   PD-L1 (22C3) Combined Positive Score  35   PD-L1 (22C3) Tumor Proportion Score % 35

## 2024-05-10 NOTE — ANESTHESIA PREPROCEDURE EVALUATION
05/10/2024  Cassandra Campos is a 73 y.o., female w/ PMH PSVT, HTN, HLP, NIDDM 10 yrs, obesity, ex smoker, DANIEL, not on CPAP. No h/o MI or CVA    *Cleared recently by her cardiologist (Dr. Pelaez)     Patient Active Problem List   Diagnosis    Hyperlipidemia associated with type 2 diabetes mellitus    Insomnia    Hip arthritis    Obesity    Paroxysmal SVT (supraventricular tachycardia)    GERD (gastroesophageal reflux disease)    Prepatellar bursitis of right knee    Transient synovitis of right knee    Chondromalacia of right knee    Primary hypertension    Calcified granuloma of lung    Osteopenia    Onychomycosis of multiple toenails with type 2 diabetes mellitus    Cough    Controlled type 2 diabetes mellitus without complication, without long-term current use of insulin    Secondary hypertension    Unequal blood pressures in paired extremities    De Quervain's disease (radial styloid tenosynovitis)    Breast screening    Tenosynovitis, de Quervain    Skin tag    Panic attack as reaction to stress    Mild intermittent asthma    BMI 31.0-31.9,adult    Elevated liver enzymes    Iron deficiency    Aortic atherosclerosis    Anxiety    Bilateral carpal tunnel syndrome    Major depressive disorder, single episode, mild    Solitary pulmonary nodule    Pelvic pain    Abdominal wall mass    Hilar mass    Hilar adenopathy    Malignant neoplasm of lower lobe of left lung    Preop cardiovascular exam    Calcification of aorta    Chronic bronchitis, unspecified chronic bronchitis type    Fall    Controlled type 2 diabetes mellitus without complication, with long-term current use of insulin     Past Medical History:   Diagnosis Date    Arthritis     hands    Bilateral bunions     Borderline glaucoma     De Quervain's disease (radial styloid tenosynovitis)     Gastritis     upper GI 2/2017    Hydradenitis      Hyperlipidemia     Hypertension     Insomnia     Migraines 2000    Nasal septum perforation     Obesity     Pneumonia     Restrictive airway disease     Sleep apnea     SVT (supraventricular tachycardia) 2013    Trigger finger     Type 2 diabetes mellitus 2012     am 2024     Past Surgical History:   Procedure Laterality Date    AXILLARY HIDRADENITIS EXCISION Bilateral     BONE EXOSTOSIS EXCISION Right 2018    Procedure: EXCISION, EXOSTOSIS;  Surgeon: Jayro Pedraza Sr., MD;  Location: [facility] OR;  Service: Orthopedics;  Laterality: Right;    BREAST BIOPSY Bilateral     both benign    BREAST SURGERY  1998    CARPAL TUNNEL RELEASE      bilateral    CARPAL TUNNEL RELEASE Right 2024    Procedure: RELEASE, CARPAL TUNNEL;  Surgeon: Jaime Oswald MD;  Location: [facility] OR;  Service: Orthopedics;  Laterality: Right;    CARPAL TUNNEL RELEASE Left 2024    Procedure: RELEASE, CARPAL TUNNEL;  Surgeon: Jaime Oswald MD;  Location: [facility] OR;  Service: Orthopedics;  Laterality: Left;    CATARACT EXTRACTION Bilateral     OU     SECTION  1979    CHOLECYSTECTOMY  2014    COLONOSCOPY N/A 10/02/2020    Procedure: COLONOSCOPY;  Surgeon: Tushar Edwards MD;  Location: [facility];  Service: Endoscopy;  Laterality: N/A;    COLONOSCOPY W/ POLYPECTOMY  10/02/2020    Polyps x3, repeat 5 years; Tushar Edwards MD     CYST REMOVAL  2015    sebaceous cyst removed from face    DE QUERVAIN'S RELEASE Left 2020    Procedure: RELEASE, HAND, FOR DEQUERVAIN'S TENOSYNOVITIS;  Surgeon: Jaime Oswald MD;  Location: [facility] OR;  Service: Orthopedics;  Laterality: Left;    DE QUERVAIN'S RELEASE Right 2020    Procedure: RELEASE, HAND, FOR DEQUERVAIN'S TENOSYNOVITIS;  Surgeon: Jaime Oswald MD;  Location: [facility] OR;  Service: Orthopedics;  Laterality: Right;    ENDOBRONCHIAL ULTRASOUND Bilateral 04/10/2024    Procedure: ENDOBRONCHIAL ULTRASOUND (EBUS);  Surgeon:  Adrian Robin MD;  Location: [facility];  Service: Pulmonary;  Laterality: Bilateral;    EYE SURGERY      gastric sleeve  02/13/2017    Dr. Watson    KNEE SURGERY Right     OLECRANON BURSECTOMY Right 07/25/2018    Procedure: BURSECTOMY, OLECRANON;  Surgeon: Jayro Pedraza Sr., MD;  Location: [facility] [place] OR;  Service: Orthopedics;  Laterality: Right;    SURGICAL REMOVAL OF BUNION WITH OSTEOTOMY OF METATARSAL BONE Left 05/10/2019    Procedure: BUNIONECTOMY, WITH METATARSAL OSTEOTOMY;  Surgeon: Srinivasan Villanueva DPM;  Location: [facility] [place] OR;  Service: Podiatry;  Laterality: Left;    SURGICAL REMOVAL OF BUNION WITH OSTEOTOMY OF METATARSAL BONE Right 06/28/2019    Procedure: BUNIONECTOMY, WITH METATARSAL OSTEOTOMY;  Surgeon: Srinivasan Villanueva DPM;  Location: [facility] [place] OR;  Service: Podiatry;  Laterality: Right;    TONSILLECTOMY, ADENOIDECTOMY  1980s    TRIGGER FINGER RELEASE Right 04/01/2015    Dr. Pedraza           Pre-op Assessment    I have reviewed the Patient Summary Reports.     I have reviewed the Nursing Notes. I have reviewed the NPO Status.      Review of Systems  Anesthesia Hx:  No problems with previous Anesthesia   History of prior surgery of interest to airway management or planning:  Previous anesthesia: General          Denies Personal Hx of Anesthesia complications.                    Social:  Former Smoker       Hematology/Oncology:  Hematology Normal   Oncology Normal    -- Denies Anemia:                                  Cardiovascular:  Exercise tolerance: good   Hypertension              ECG has been reviewed.                          Pulmonary:  Pneumonia  Asthma    Sleep Apnea                Renal/:  Renal/ Normal                 Hepatic/GI:     GERD             Musculoskeletal:  Arthritis               Neurological:    Neuromuscular Disease,  Headaches                                 Endocrine:  Diabetes, type 2   BMI 31      Obesity / BMI > 30  Dermatological:  Skin Normal    Psych:  Psychiatric History                 Patient Active Problem List   Diagnosis    Hyperlipidemia associated with type 2 diabetes mellitus    Insomnia    Hip arthritis    Obesity    Paroxysmal SVT (supraventricular tachycardia)    GERD (gastroesophageal reflux disease)    Prepatellar bursitis of right knee    Transient synovitis of right knee    Chondromalacia of right knee    Primary hypertension    Calcified granuloma of lung    Osteopenia    Onychomycosis of multiple toenails with type 2 diabetes mellitus    Cough    Controlled type 2 diabetes mellitus without complication, without long-term current use of insulin    Secondary hypertension    Unequal blood pressures in paired extremities    De Quervain's disease (radial styloid tenosynovitis)    Breast screening    Tenosynovitis, de Quervain    Skin tag    Panic attack as reaction to stress    Mild intermittent asthma    BMI 31.0-31.9,adult    Elevated liver enzymes    Iron deficiency    Aortic atherosclerosis    Anxiety    Bilateral carpal tunnel syndrome    Major depressive disorder, single episode, mild    Solitary pulmonary nodule    Pelvic pain    Abdominal wall mass    Hilar mass    Hilar adenopathy    Malignant neoplasm of lower lobe of left lung    Preop cardiovascular exam    Calcification of aorta    Chronic bronchitis, unspecified chronic bronchitis type    Fall    Controlled type 2 diabetes mellitus without complication, with long-term current use of insulin       Chemistry        Component Value Date/Time     05/08/2024 1110    K 4.0 05/08/2024 1110     05/08/2024 1110    CO2 20 (L) 05/08/2024 1110    BUN 7 (L) 05/08/2024 1110    CREATININE 0.6 05/08/2024 1110    GLU 88 05/08/2024 1110        Component Value Date/Time    CALCIUM 10.2 05/08/2024 1110    ALKPHOS 108 04/01/2024 1211    AST 31 04/01/2024 1211    ALT 53 (H) 04/01/2024 1211    BILITOT 0.3 04/01/2024 1211    ESTGFRAFRICA >60 06/21/2022 0935    EGFRNONAA >60 06/21/2022 0935        Lab Results   Component Value  Date    WBC 7.17 05/08/2024    HGB 13.9 05/08/2024    HCT 40.6 05/08/2024    MCV 76 (L) 05/08/2024     05/08/2024         Results for orders placed during the hospital encounter of 05/01/24    Echo    Interpretation Summary    Left Ventricle: The left ventricle is normal in size. Normal wall thickness. There is concentric remodeling. Normal wall motion. Septal motion is consistent with bundle branch block. There is normal systolic function. Ejection fraction by visual approximation is 55%. There is normal diastolic function.    Right Ventricle: Normal right ventricular cavity size. Wall thickness is normal. Systolic function is normal.    Tricuspid Valve: There is moderate regurgitation with a centrally directed jet.    Pulmonary Artery: The estimated pulmonary artery systolic pressure is 29 mmHg.    IVC/SVC: Normal venous pressure at 3 mmHg.      Physical Exam  General: Cooperative, Alert, Oriented and Well nourished    Airway:  Mallampati: III   Mouth Opening: Normal  TM Distance: Normal  Tongue: Normal  Neck ROM: Normal ROM    Dental:  Dentures, Edentulous        Anesthesia Plan  Type of Anesthesia, risks & benefits discussed:    Anesthesia Type: Gen ETT  Intra-op Monitoring Plan: Standard ASA Monitors  Post Op Pain Control Plan: multimodal analgesia and IV/PO Opioids PRN  Induction:  IV  Airway Plan: Direct, Post-Induction  Informed Consent: Informed consent signed with the Patient and all parties understand the risks and agree with anesthesia plan.  All questions answered.   ASA Score: 3  Day of Surgery Review of History & Physical: H&P Update referred to the surgeon/provider.I have interviewed and examined the patient. I have reviewed the patient's H&P dated: There are no significant changes. H&P completed by Anesthesiologist.    Ready For Surgery From Anesthesia Perspective.     .

## 2024-05-10 NOTE — OP NOTE
[facility] - Surgery ([facility])  Cardiothoracic Surgery  Operative Note    SUMMARY     Date of Procedure: 5/10/2024     Procedure: Procedure(s) (LRB):  XI ROBOTIC RATS,WITH LOBECTOMY,LUNG (Left)  EXCISION, LYMPH NODE (Left)  BLOCK, NERVE, INTERCOSTAL, 2 OR MORE  Lingulectomy   The external lymph node dissection    Surgeons and Role:     * Mike Nuñez MD - Primary    Assisting Surgeon: None    Pre-Operative Diagnosis: Malignant neoplasm of upper lobe of left lung [C34.32]  Adenocarcinoma of the left upper lobe  Hypertension  Hyperlipidemia  Obesity  Sleep apnea  COPD  Type 2 diabetes mellitus  Depression    Post-Operative Diagnosis:   Same        Anesthesia: General    Operative Findings (including complications, if any):  Mass involving the lingula of the left upper lobe.  Removal of the lymph node 4L 567 9 L    Description of Technical Procedures:   Patient was taken to the operating room placed in a supine position endotracheal general anesthesia was given with double-lumen endotracheal tube monitoring lines were placed preoperative antibiotics were given Medina catheter was introduced under aseptic precaution patient was then placed in a right lateral position.  The patient was then adequately padded pressure points protected surgical time-out was done.  A IntYi robot was then trouble shot and brought into the field under aseptic precautions sterilely prepared made a port placement in the 8th intercostal space in the midaxillary line 2 more working ports were placed anteriorly and 2 posteriorly in the same intercostal space.  With the help of a 7 mm 0 degree scope diagnostic thoracoscopy was done in a systematic fashion underlying lung was found to be anthracotic single lung ventilation was used carbon dioxide insufflation was used to create pneumothorax.  Mass on the lingula was felt.  And the robot was docked in position.  I started the procedure with the help of a cardia and bipolar cautery to take down  the inferior pulmonary ligament.  This expose the inferior pulmonary vein.  Continued the posterior dissection all the way up to the hilum and superiorly where I entered the plane exposing the main pulmonary artery.  Dissection was carried in the same adventitial plane up to the apex of the lung.  This freed up the posterior aspect then I carried out the anterior hilar dissection into the tracheoesophageal groove inferior pulmonary ligament the inferior pulmonary vein was identified continued upwards superior pulmonary vein and the branch to the lingula was identified.  At this time I also entered the interlobar fissure and opened up and displayed the branches to the lingular artery 2 branches were taking off.  After delineating all the vascular structures I used a vascular load staple as well as the blue load to take down the lingula in a generous wedge towards the hilum.  After final firing the lung was inflated and made sure that the upper lobe was expanding.  The specimen was sent for pathology.  At this time I went and did the lymph node dissection involving the lymph nodes 9 L anterior hilar node Tinel 7 6 5 and 4R lymph nodes frozen section of the anterior hilar node 10R came back suspicious for malignancy.  Staple line was checked for hemostasis found to be good port sites were checked for hemostasis found to be good used the anterior port to place a 24 [place] straight chest tube intercostal nerve block was given with 0.5% Marcaine and Exparel.  The patient was extubated on the table was transferred to the recovery in stable condition.    Significant Surgical Tasks Conducted by the Assistant(s), if Applicable:     Estimated Blood Loss (EBL): * No values recorded between 5/10/2024 12:17 PM and 5/10/2024  6:06 PM *           Implants: * No implants in log *    Specimens:   Specimen (24h ago, onward)       Start     Ordered    05/10/24 1750  Specimen to Pathology, Surgery Cardiovascular  Once        Comments:  Pre-op Diagnosis: Malignant neoplasm of lower lobe of left lung [C34.32]Procedure(s):XI ROBOTIC RATS,WITH LOBECTOMY,LUNGEXCISION, LYMPH NODEBLOCK, NERVE, INTERCOSTAL, 2 OR MORE Number of specimens: 6Name of specimens: 1.  Hilar lymph node-frozen section/perm.; 2.  Level 9 lymph node-perm.; 3.  Level 6 lymph node-perm.; 4. Level 5 lymph node-perm.; 5. Level 4L lymph node-perm.; 6. Left Upper Lobe-perm.     References:    Click here for ordering Quick Tip   Question Answer Comment   Procedure Type: Cardiovascular    Specimen Class: Routine/Screening    Release to patient Immediate        05/10/24 1752    Signed and Held  Specimen to Pathology, Surgery Cardiovascular  Once        Comments: Pre-op Diagnosis: Malignant neoplasm of lower lobe of left lung [C34.32]Procedure(s):XI ROBOTIC RATS,WITH LOBECTOMY,LUNGEXCISION, LYMPH NODEBLOCK, NERVE, INTERCOSTAL, 2 OR MORE Number of specimens: 6Name of specimens: 1) Hilar lymph node-frozen section/perm.; 2) Level 9 lymph node-perm.; 3) Level 6 lymph node-perm.; 4) Level 5 lymph node-perm.; 5) Level 4 L lymph node-perm.; 6) Left Upper Lobe-perm.     References:    Click here for ordering Quick Tip   Question Answer Comment   Procedure Type: Cardiovascular    Specimen Class: Routine/Screening    Release to patient Immediate        Signed and Held                            Condition: Good    Disposition: PACU - hemodynamically stable.    Attestation: I was present and scrubbed for the entire procedure.

## 2024-05-11 LAB
ANION GAP SERPL CALC-SCNC: 8 MMOL/L (ref 8–16)
BASOPHILS # BLD AUTO: 0.04 K/UL (ref 0–0.2)
BASOPHILS NFR BLD: 0.4 % (ref 0–1.9)
BUN SERPL-MCNC: 10 MG/DL (ref 8–23)
CALCIUM SERPL-MCNC: 9.3 MG/DL (ref 8.7–10.5)
CHLORIDE SERPL-SCNC: 103 MMOL/L (ref 95–110)
CO2 SERPL-SCNC: 25 MMOL/L (ref 23–29)
CREAT SERPL-MCNC: 0.7 MG/DL (ref 0.5–1.4)
DIFFERENTIAL METHOD BLD: ABNORMAL
EOSINOPHIL # BLD AUTO: 0 K/UL (ref 0–0.5)
EOSINOPHIL NFR BLD: 0.1 % (ref 0–8)
ERYTHROCYTE [DISTWIDTH] IN BLOOD BY AUTOMATED COUNT: 14.4 % (ref 11.5–14.5)
EST. GFR  (NO RACE VARIABLE): >60 ML/MIN/1.73 M^2
ESTIMATED AVG GLUCOSE: 108 MG/DL (ref 68–131)
GLUCOSE SERPL-MCNC: 156 MG/DL (ref 70–110)
HBA1C MFR BLD: 5.4 % (ref 4–5.6)
HCT VFR BLD AUTO: 37 % (ref 37–48.5)
HGB BLD-MCNC: 12.5 G/DL (ref 12–16)
IMM GRANULOCYTES # BLD AUTO: 0.04 K/UL (ref 0–0.04)
IMM GRANULOCYTES NFR BLD AUTO: 0.4 % (ref 0–0.5)
LYMPHOCYTES # BLD AUTO: 1.8 K/UL (ref 1–4.8)
LYMPHOCYTES NFR BLD: 17.3 % (ref 18–48)
MCH RBC QN AUTO: 25.8 PG (ref 27–31)
MCHC RBC AUTO-ENTMCNC: 33.8 G/DL (ref 32–36)
MCV RBC AUTO: 76 FL (ref 82–98)
MONOCYTES # BLD AUTO: 0.8 K/UL (ref 0.3–1)
MONOCYTES NFR BLD: 7.3 % (ref 4–15)
NEUTROPHILS # BLD AUTO: 7.9 K/UL (ref 1.8–7.7)
NEUTROPHILS NFR BLD: 74.5 % (ref 38–73)
NRBC BLD-RTO: 0 /100 WBC
PLATELET # BLD AUTO: 246 K/UL (ref 150–450)
PMV BLD AUTO: 9 FL (ref 9.2–12.9)
POCT GLUCOSE: 128 MG/DL (ref 70–110)
POCT GLUCOSE: 135 MG/DL (ref 70–110)
POCT GLUCOSE: 206 MG/DL (ref 70–110)
POTASSIUM SERPL-SCNC: 4 MMOL/L (ref 3.5–5.1)
RBC # BLD AUTO: 4.85 M/UL (ref 4–5.4)
SODIUM SERPL-SCNC: 136 MMOL/L (ref 136–145)
WBC # BLD AUTO: 10.6 K/UL (ref 3.9–12.7)

## 2024-05-11 PROCEDURE — 99900035 HC TECH TIME PER 15 MIN (STAT)

## 2024-05-11 PROCEDURE — 94640 AIRWAY INHALATION TREATMENT: CPT

## 2024-05-11 PROCEDURE — 94761 N-INVAS EAR/PLS OXIMETRY MLT: CPT

## 2024-05-11 PROCEDURE — 94799 UNLISTED PULMONARY SVC/PX: CPT

## 2024-05-11 PROCEDURE — 97530 THERAPEUTIC ACTIVITIES: CPT

## 2024-05-11 PROCEDURE — 27000221 HC OXYGEN, UP TO 24 HOURS

## 2024-05-11 PROCEDURE — 25000242 PHARM REV CODE 250 ALT 637 W/ HCPCS: Performed by: THORACIC SURGERY (CARDIOTHORACIC VASCULAR SURGERY)

## 2024-05-11 PROCEDURE — 85025 COMPLETE CBC W/AUTO DIFF WBC: CPT | Performed by: THORACIC SURGERY (CARDIOTHORACIC VASCULAR SURGERY)

## 2024-05-11 PROCEDURE — 36415 COLL VENOUS BLD VENIPUNCTURE: CPT | Performed by: THORACIC SURGERY (CARDIOTHORACIC VASCULAR SURGERY)

## 2024-05-11 PROCEDURE — 21400001 HC TELEMETRY ROOM

## 2024-05-11 PROCEDURE — 63600175 PHARM REV CODE 636 W HCPCS: Performed by: THORACIC SURGERY (CARDIOTHORACIC VASCULAR SURGERY)

## 2024-05-11 PROCEDURE — 80048 BASIC METABOLIC PNL TOTAL CA: CPT | Performed by: THORACIC SURGERY (CARDIOTHORACIC VASCULAR SURGERY)

## 2024-05-11 PROCEDURE — 25000003 PHARM REV CODE 250: Performed by: THORACIC SURGERY (CARDIOTHORACIC VASCULAR SURGERY)

## 2024-05-11 PROCEDURE — 97161 PT EVAL LOW COMPLEX 20 MIN: CPT

## 2024-05-11 PROCEDURE — 83036 HEMOGLOBIN GLYCOSYLATED A1C: CPT | Performed by: THORACIC SURGERY (CARDIOTHORACIC VASCULAR SURGERY)

## 2024-05-11 RX ORDER — OXYCODONE HYDROCHLORIDE 5 MG/1
10 TABLET ORAL EVERY 4 HOURS PRN
Status: DISCONTINUED | OUTPATIENT
Start: 2024-05-11 | End: 2024-05-12

## 2024-05-11 RX ADMIN — ENOXAPARIN SODIUM 40 MG: 40 INJECTION SUBCUTANEOUS at 04:05

## 2024-05-11 RX ADMIN — FAMOTIDINE 20 MG: 20 TABLET ORAL at 08:05

## 2024-05-11 RX ADMIN — CEFAZOLIN 2 G: 2 INJECTION, POWDER, FOR SOLUTION INTRAMUSCULAR; INTRAVENOUS at 10:05

## 2024-05-11 RX ADMIN — OXYCODONE HYDROCHLORIDE 5 MG: 5 TABLET ORAL at 05:05

## 2024-05-11 RX ADMIN — FAMOTIDINE 20 MG: 20 TABLET ORAL at 09:05

## 2024-05-11 RX ADMIN — METOPROLOL TARTRATE 25 MG: 25 TABLET, FILM COATED ORAL at 09:05

## 2024-05-11 RX ADMIN — LORAZEPAM 1 MG: 1 TABLET ORAL at 08:05

## 2024-05-11 RX ADMIN — CEFAZOLIN 2 G: 2 INJECTION, POWDER, FOR SOLUTION INTRAMUSCULAR; INTRAVENOUS at 05:05

## 2024-05-11 RX ADMIN — CEFAZOLIN 2 G: 2 INJECTION, POWDER, FOR SOLUTION INTRAMUSCULAR; INTRAVENOUS at 12:05

## 2024-05-11 RX ADMIN — MUPIROCIN 1 G: 20 OINTMENT TOPICAL at 09:05

## 2024-05-11 RX ADMIN — IPRATROPIUM BROMIDE 0.5 MG: 0.5 SOLUTION RESPIRATORY (INHALATION) at 12:05

## 2024-05-11 RX ADMIN — IPRATROPIUM BROMIDE 0.5 MG: 0.5 SOLUTION RESPIRATORY (INHALATION) at 03:05

## 2024-05-11 RX ADMIN — IPRATROPIUM BROMIDE 0.5 MG: 0.5 SOLUTION RESPIRATORY (INHALATION) at 08:05

## 2024-05-11 RX ADMIN — METOPROLOL TARTRATE 25 MG: 25 TABLET, FILM COATED ORAL at 08:05

## 2024-05-11 RX ADMIN — AMITRIPTYLINE HYDROCHLORIDE 100 MG: 50 TABLET, FILM COATED ORAL at 09:05

## 2024-05-11 RX ADMIN — POLYETHYLENE GLYCOL 3350 17 G: 17 POWDER, FOR SOLUTION ORAL at 08:05

## 2024-05-11 RX ADMIN — IPRATROPIUM BROMIDE 0.5 MG: 0.5 SOLUTION RESPIRATORY (INHALATION) at 04:05

## 2024-05-11 RX ADMIN — LORAZEPAM 1 MG: 1 TABLET ORAL at 09:05

## 2024-05-11 RX ADMIN — OXYCODONE HYDROCHLORIDE 10 MG: 5 TABLET ORAL at 01:05

## 2024-05-11 RX ADMIN — ESOMEPRAZOLE MAGNESIUM 10 MG: 2.5 GRANULE, DELAYED RELEASE ORAL at 05:05

## 2024-05-11 RX ADMIN — OXYCODONE HYDROCHLORIDE 10 MG: 5 TABLET ORAL at 05:05

## 2024-05-11 RX ADMIN — MUPIROCIN 1 G: 20 OINTMENT TOPICAL at 08:05

## 2024-05-11 NOTE — PT/OT/SLP EVAL
Physical Therapy Evaluation    Patient Name:  Cassandra Capmos   MRN:  [id number]    Recommendations:     Discharge Recommendations: Moderate Intensity Therapy   Discharge Equipment Recommendations: none   Barriers to discharge: None    Assessment:     Cassandra Campos is a 73 y.o. female admitted with a medical diagnosis of <principal problem not specified>.  She presents with the following impairments/functional limitations: weakness, impaired endurance, impaired functional mobility, decreased safety awareness, gait instability, impaired balance .    Rehab Prognosis: Good; patient would benefit from acute skilled PT services to address these deficits and reach maximum level of function.    Recent Surgery: Procedure(s) (LRB):  XI ROBOTIC RATS,WITH LOBECTOMY,LUNG (Left)  EXCISION, LYMPH NODE (Left)  BLOCK, NERVE, INTERCOSTAL, 2 OR MORE 1 Day Post-Op    Plan:     During this hospitalization, patient to be seen 3 x/week to address the identified rehab impairments via gait training, therapeutic activities, therapeutic exercises, neuromuscular re-education and progress toward the following goals:    Plan of Care Expires:  05/25/24    Subjective     Chief Complaint: weak  Patient/Family Comments/goals: weak tired and wants to return home with family when able  Pain/Comfort:  Pain Rating 1: 6/10 (chest)  Pain Addressed 1: Reposition, Nurse notified, Distraction  Pain Rating Post-Intervention 1: 6/10    Patients cultural, spiritual, Bahai conflicts given the current situation: yes    Living Environment:  Pt was living home alone including drivng and self care.   Prior to admission, patients level of function was Mod I.  Equipment used at home: cane, straight, cane, quad, walker, rolling, shower chair.  DME owned (not currently used): none.  Upon discharge, patient will have assistance from family nearby.    Objective:     Communicated with nurse Smith prior to session.  Patient found supine with telemetry, SCD,  peripheral IV  upon PT entry to room.    General Precautions: Standard, fall  Orthopedic Precautions:N/A   Braces: N/A  Respiratory Status: Room air    Exams:  RLE ROM: WFL  RLE Strength: WFL except 3/5 grossly  LLE ROM: WFL  LLE Strength: WFL except grossly 3/5    Functional Mobility:  Bed Mobility:     Rolling Left:  moderate assistance  Supine to Sit: moderate assistance  Transfers:     Sit to Stand:  minimum assistance with rolling walker  Gait: ambulated with RW 5 ft to walk to chair with CGA      AM-PAC 6 CLICK MOBILITY  Total Score:16       Treatment & Education:  PT educated pt on using call button to return to bed and performing LAQ as tolerated to help work on mobilty.    Patient left up in chair with all lines intact, call button in reach, bed alarm on, nurse notified, and family present.    GOALS:   Multidisciplinary Problems       Physical Therapy Goals          Problem: Physical Therapy    Goal Priority Disciplines Outcome Goal Variances Interventions   Physical Therapy Goal     PT, PT/OT      Description: LTG'S TO BE MET IN 14 DAYS (5/25/24)  PT WILL REQUIRE Mod I FOR BED MOBILITY  PT WILL REQUIRE Mod I FOR BED<>CHAIR TF'S  PT WILL  FEET WITH RW AND SBA  PT WILL INC AMPAC SCORE BY 2 POINTS TO PROGRESS GROSS FUNC MOBILITY                         History:     Past Medical History:   Diagnosis Date    Arthritis     hands    Bilateral bunions     Borderline glaucoma     De Quervain's disease (radial styloid tenosynovitis)     Gastritis     upper GI 2/2017    Hydradenitis     Hyperlipidemia     Hypertension     Insomnia     Migraines 02/01/2000    Nasal septum perforation     Obesity     Pneumonia     Restrictive airway disease     Sleep apnea     SVT (supraventricular tachycardia) 09/2013    Trigger finger     Type 2 diabetes mellitus 2012     am 02/01/2024       Past Surgical History:   Procedure Laterality Date    AXILLARY HIDRADENITIS EXCISION Bilateral     BONE EXOSTOSIS EXCISION Right  2018    Procedure: EXCISION, EXOSTOSIS;  Surgeon: Jayro Pedraza Sr., MD;  Location: [facility];  Service: Orthopedics;  Laterality: Right;    BREAST BIOPSY Bilateral     both benign    BREAST SURGERY  1998    CARPAL TUNNEL RELEASE      bilateral    CARPAL TUNNEL RELEASE Right 2024    Procedure: RELEASE, CARPAL TUNNEL;  Surgeon: Jaime Oswald MD;  Location: [facility] OR;  Service: Orthopedics;  Laterality: Right;    CARPAL TUNNEL RELEASE Left 2024    Procedure: RELEASE, CARPAL TUNNEL;  Surgeon: Jaime Oswald MD;  Location: [facility];  Service: Orthopedics;  Laterality: Left;    CATARACT EXTRACTION Bilateral     OU     SECTION  1979    CHOLECYSTECTOMY  2014    COLONOSCOPY N/A 10/02/2020    Procedure: COLONOSCOPY;  Surgeon: Tushar Edwards MD;  Location: [facility];  Service: Endoscopy;  Laterality: N/A;    COLONOSCOPY W/ POLYPECTOMY  10/02/2020    Polyps x3, repeat 5 years; Tushar Edwards MD     CYST REMOVAL  2015    sebaceous cyst removed from face    DE QUERVAIN'S RELEASE Left 2020    Procedure: RELEASE, HAND, FOR DEQUERVAIN'S TENOSYNOVITIS;  Surgeon: Jaime Oswald MD;  Location: [facility] OR;  Service: Orthopedics;  Laterality: Left;    DE QUERVAIN'S RELEASE Right 2020    Procedure: RELEASE, HAND, FOR DEQUERVAIN'S TENOSYNOVITIS;  Surgeon: Jaime Oswald MD;  Location: [facility];  Service: Orthopedics;  Laterality: Right;    ENDOBRONCHIAL ULTRASOUND Bilateral 04/10/2024    Procedure: ENDOBRONCHIAL ULTRASOUND (EBUS);  Surgeon: Adrian Robin MD;  Location: [facility];  Service: Pulmonary;  Laterality: Bilateral;    EYE SURGERY      gastric sleeve  2017    Dr. Watson    KNEE SURGERY Right     OLECRANON BURSECTOMY Right 2018    Procedure: BURSECTOMY, OLECRANON;  Surgeon: Jayro Pedraza Sr., MD;  Location: [facility];  Service: Orthopedics;  Laterality: Right;    SURGICAL REMOVAL OF BUNION WITH OSTEOTOMY OF METATARSAL BONE Left 05/10/2019     Procedure: BUNIONECTOMY, WITH METATARSAL OSTEOTOMY;  Surgeon: Srinivasan Villanueva DPM;  Location: [facility] OR;  Service: Podiatry;  Laterality: Left;    SURGICAL REMOVAL OF BUNION WITH OSTEOTOMY OF METATARSAL BONE Right 06/28/2019    Procedure: BUNIONECTOMY, WITH METATARSAL OSTEOTOMY;  Surgeon: Srinivasan Villanueva DPM;  Location: [facility] OR;  Service: Podiatry;  Laterality: Right;    TONSILLECTOMY, ADENOIDECTOMY  1980s    TRIGGER FINGER RELEASE Right 04/01/2015    Dr. Pedraza       Time Tracking:     PT Received On: 05/11/24  PT Start Time: 1101     PT Stop Time: 1133  PT Total Time (min): 32 min     Billable Minutes: Evaluation 15 and Therapeutic Activity 17      05/11/2024

## 2024-05-11 NOTE — PROGRESS NOTES
Subjective:      Patient ID: Cassandra Campos is a 73 y.o. female.    Chief Complaint: No chief complaint on file.    HPI:  73-year-old female who has a ex-smoker found to have a left upper lobe pulmonary nodule had a biopsy which showed adenocarcinoma.  Patient has history of hypertension type 2 diabetes mellitus and hyperlipidemia.  No history of cough no history of hemoptysis at this time.  She had a full workup including a PET scan which showed left upper lobe lesion which has high activity and a mediastinal lymph node.  She underwent biopsy for mediastinal lymph node with an EBUS which came back as negative for malignancy.  Date of Procedure: 5/10/2024      Procedure: Procedure(s) (LRB):  XI ROBOTIC RATS,WITH LOBECTOMY,LUNG (Left)  EXCISION, LYMPH NODE (Left)  BLOCK, NERVE, INTERCOSTAL, 2 OR MORE  Lingulectomy   The external lymph node dissection     Surgeons and Role:     * Mike Nuñez MD - Primary     Assisting Surgeon: None     Pre-Operative Diagnosis: Malignant neoplasm of upper lobe of left lung [C34.32]  Adenocarcinoma of the left upper lobe  Hypertension  Hyperlipidemia  Obesity  Sleep apnea  COPD  Type 2 diabetes mellitus  Depression     Post-Operative Diagnosis:   Same    Pain overnight chest tube draining serosanguineous drainage no air leak chest tube to water seal this morning incentive spirometry less than 500 mostly splinting from the pain.      Review of patient's allergies indicates:  No Known Allergies    Past Medical History:   Diagnosis Date    Arthritis     hands    Bilateral bunions     Borderline glaucoma     De Quervain's disease (radial styloid tenosynovitis)     Gastritis     upper GI 2/2017    Hydradenitis     Hyperlipidemia     Hypertension     Insomnia     Migraines 02/01/2000    Nasal septum perforation     Obesity     Pneumonia     Restrictive airway disease     Sleep apnea     SVT (supraventricular tachycardia) 09/2013    Trigger finger     Type 2 diabetes mellitus 2012      am 2024       Family History   Problem Relation Name Age of Onset    Prostate cancer Brother      Diabetes Maternal Aunt Alondra Campos     Diabetes Cousin      Hypertension Maternal Grandmother Portia Orosco        Social History     Socioeconomic History    Marital status:     Number of children: 1   Occupational History    Occupation:  aid   Tobacco Use    Smoking status: Former     Current packs/day: 0.00     Average packs/day: 0.5 packs/day for 42.0 years (21.0 ttl pk-yrs)     Types: Cigarettes     Start date: 1970     Quit date: 2012     Years since quittin.3     Passive exposure: Past    Smokeless tobacco: Never   Substance and Sexual Activity    Alcohol use: Not Currently     Alcohol/week: 1.0 standard drink of alcohol     Types: 1 Glasses of wine per week     Comment: Glass red wine once a every 2 weeks    Drug use: Never    Sexual activity: Not Currently     Partners: Female     Birth control/protection: Abstinence, None   Social History Narrative    Single, part-time teacher. Masters degree biology.      Social Determinants of Health     Financial Resource Strain: Low Risk  (2023)    Overall Financial Resource Strain (CARDIA)     Difficulty of Paying Living Expenses: Not hard at all   Food Insecurity: Food Insecurity Present (2023)    Hunger Vital Sign     Worried About Running Out of Food in the Last Year: Never true     Ran Out of Food in the Last Year: Sometimes true   Transportation Needs: No Transportation Needs (2023)    PRAPARE - Transportation     Lack of Transportation (Medical): No     Lack of Transportation (Non-Medical): No   Physical Activity: Insufficiently Active (2023)    Exercise Vital Sign     Days of Exercise per Week: 3 days     Minutes of Exercise per Session: 20 min   Stress: No Stress Concern Present (2023)    [employer] [place] [employer] - Occupational Stress Questionnaire     Feeling of  Stress : Not at all   Housing Stability: Low Risk  (2023)    Housing Stability Vital Sign     Unable to Pay for Housing in the Last Year: No     Number of Places Lived in the Last Year: 1     Unstable Housing in the Last Year: No       Past Surgical History:   Procedure Laterality Date    AXILLARY HIDRADENITIS EXCISION Bilateral     BONE EXOSTOSIS EXCISION Right 2018    Procedure: EXCISION, EXOSTOSIS;  Surgeon: Jayro Pedraza Sr., MD;  Location: [facility];  Service: Orthopedics;  Laterality: Right;    BREAST BIOPSY Bilateral     both benign    BREAST SURGERY  1998    CARPAL TUNNEL RELEASE      bilateral    CARPAL TUNNEL RELEASE Right 2024    Procedure: RELEASE, CARPAL TUNNEL;  Surgeon: Jaime Oswald MD;  Location: [facility] OR;  Service: Orthopedics;  Laterality: Right;    CARPAL TUNNEL RELEASE Left 2024    Procedure: RELEASE, CARPAL TUNNEL;  Surgeon: Jaime Oswald MD;  Location: [facility];  Service: Orthopedics;  Laterality: Left;    CATARACT EXTRACTION Bilateral     OU     SECTION  1979    CHOLECYSTECTOMY  2014    COLONOSCOPY N/A 10/02/2020    Procedure: COLONOSCOPY;  Surgeon: Tushar Edwards MD;  Location: [facility];  Service: Endoscopy;  Laterality: N/A;    COLONOSCOPY W/ POLYPECTOMY  10/02/2020    Polyps x3, repeat 5 years; Tushar Edwards MD     CYST REMOVAL  2015    sebaceous cyst removed from face    DE QUERVAIN'S RELEASE Left 2020    Procedure: RELEASE, HAND, FOR DEQUERVAIN'S TENOSYNOVITIS;  Surgeon: Jaime Oswald MD;  Location: [facility] OR;  Service: Orthopedics;  Laterality: Left;    DE QUERVAIN'S RELEASE Right 2020    Procedure: RELEASE, HAND, FOR DEQUERVAIN'S TENOSYNOVITIS;  Surgeon: Jaime Oswald MD;  Location: [facility];  Service: Orthopedics;  Laterality: Right;    ENDOBRONCHIAL ULTRASOUND Bilateral 04/10/2024    Procedure: ENDOBRONCHIAL ULTRASOUND (EBUS);  Surgeon: Adrian Robin MD;  Location: [facility];  Service:  "Pulmonary;  Laterality: Bilateral;    EYE SURGERY      gastric sleeve  02/13/2017    Dr. Watson    KNEE SURGERY Right     OLECRANON BURSECTOMY Right 07/25/2018    Procedure: BURSECTOMY, OLECRANON;  Surgeon: Jayro Pedraza Sr., MD;  Location: [facility];  Service: Orthopedics;  Laterality: Right;    SURGICAL REMOVAL OF BUNION WITH OSTEOTOMY OF METATARSAL BONE Left 05/10/2019    Procedure: BUNIONECTOMY, WITH METATARSAL OSTEOTOMY;  Surgeon: Srinivasan Villanueva DPM;  Location: [facility] OR;  Service: Podiatry;  Laterality: Left;    SURGICAL REMOVAL OF BUNION WITH OSTEOTOMY OF METATARSAL BONE Right 06/28/2019    Procedure: BUNIONECTOMY, WITH METATARSAL OSTEOTOMY;  Surgeon: Srinivasan Villanueva DPM;  Location: [facility] OR;  Service: Podiatry;  Laterality: Right;    TONSILLECTOMY, ADENOIDECTOMY  1980s    TRIGGER FINGER RELEASE Right 04/01/2015    Dr. Pedraza       Review of Systems   Constitutional:  Negative for activity change and appetite change.   HENT:  Negative for dental problem, nosebleeds and sore throat.    Eyes:  Negative for discharge and visual disturbance.   Respiratory:  Positive for chest tightness and shortness of breath. Negative for cough and stridor.    Cardiovascular:  Negative for leg swelling.   Gastrointestinal:  Negative for abdominal distention and abdominal pain.   Genitourinary:  Negative for difficulty urinating and dysuria.   Musculoskeletal:  Negative for arthralgias, back pain and joint swelling.   Allergic/Immunologic: Negative for environmental allergies.   Neurological:  Negative for dizziness, syncope and headaches.   Hematological:  Does not bruise/bleed easily.   Psychiatric/Behavioral:  Negative for behavioral problems.           Objective:   BP (!) 145/79   Pulse 97   Temp 99.5 °F (37.5 °C) (Oral)   Resp 18   Ht 5' 3" (1.6 m)   Wt 80.7 kg (177 lb 14.6 oz)   SpO2 (!) 94%   Breastfeeding No   BMI 31.52 kg/m²     X-Ray Chest AP Single View  Narrative: EXAMINATION:  XR CHEST 1 VIEW    CLINICAL " HISTORY:  post operative; Localized enlarged lymph nodes    TECHNIQUE:  Single frontal view of the chest was performed.    COMPARISON:  None    FINDINGS:  Prominent superior mediastinum likely related to mediastinal lipomatosis.  Discoid opacity in the left hilar region.  Blunting of the right costophrenic angle.  Tubing overlying the left lower chest suggestive of chest tube.  Correlate clinically.  No pneumothorax.  Correlate to surgical history.    Bones are intact.  Impression: As above    Electronically signed by: Gregor Deluna  Date:    05/10/2024  Time:    19:11         Physical Exam  Vitals reviewed.   Constitutional:       Appearance: Normal appearance.   HENT:      Head: Normocephalic and atraumatic.      Mouth/Throat:      Mouth: Mucous membranes are moist.   Eyes:      Extraocular Movements: Extraocular movements intact.   Cardiovascular:      Rate and Rhythm: Normal rate and regular rhythm.      Pulses: Normal pulses.      Heart sounds: Normal heart sounds.      Comments: Chest tube in place draining serosanguineous liquid no air leak  Pulmonary:      Effort: Pulmonary effort is normal.      Breath sounds: Rhonchi and rales present.   Abdominal:      Palpations: Abdomen is soft.   Musculoskeletal:         General: Normal range of motion.      Cervical back: Normal range of motion and neck supple.   Skin:     General: Skin is warm.      Capillary Refill: Capillary refill takes less than 2 seconds.   Neurological:      General: No focal deficit present.      Mental Status: She is alert and oriented to person, place, and time.   Psychiatric:         Mood and Affect: Mood normal.     Chest x-ray shows bilateral atelectasis postsurgical changes.    Assessment:     1. Hilar adenopathy    2. Malignant neoplasm of lower lobe of left lung    3. Bilateral carpal tunnel syndrome    Obesity  Hypertension  Hyperlipidemia  Depression  Sleep apnea      Plan   Postop day 1 status post rats left-sided lingulectomy and  mediastinal lymph node dissection for a adenocarcinoma.  Neuro Pain control as needed on oxycodone p.r.n.  Cardio vascular staple beta-blocker  Respiratory aggressive pulmonary toilet nebulizers out of bed ambulate incentive spirometry Pain control chest tube to water seal  PTOT out of bed ambulate.  GI regular diet   removed the [place]  DVT prophylaxis on Lovenox  GI prophylaxis on esomeprazole  Restarted her home meds    Chest x-ray in a.m.  Plan for DC tomorrow          Mike Nuñez MD  [facility]  [place]

## 2024-05-11 NOTE — PLAN OF CARE
Pt resting on bed s/p RATS with hilar lymph node excision, intercostal nerve block, lingulectomy performed undfer general anesthesia by Dr. Nuñez. VSS. Will cont plan of care.

## 2024-05-11 NOTE — PLAN OF CARE
P.T. EVAL COMPLETE.  PT CURRENTLY REQUIRES MOD A x 1 for rolling to the R for supine to sit, sit to stand with MIN A and ambulated with RW for 5 ft with SBA .  P.T. RECOMMENDS Moderate INTENSITY THERAPY UPON DISCHARGE

## 2024-05-11 NOTE — PLAN OF CARE
A207/A207 RUBEN Campos is a 73 y.o.female admitted on 5/10/2024 for <principal problem not specified>   Code Status: Full Code MRN: [id number]   Review of patient's allergies indicates:  No Known Allergies  Past Medical History:   Diagnosis Date    Arthritis     hands    Bilateral bunions     Borderline glaucoma     De Quervain's disease (radial styloid tenosynovitis)     Gastritis     upper GI 2/2017    Hydradenitis     Hyperlipidemia     Hypertension     Insomnia     Migraines 02/01/2000    Nasal septum perforation     Obesity     Pneumonia     Restrictive airway disease     Sleep apnea     SVT (supraventricular tachycardia) 09/2013    Trigger finger     Type 2 diabetes mellitus 2012     am 02/01/2024      PRN meds    bisacodyL, 10 mg, Daily PRN  dextrose 10%, 12.5 g, PRN  dextrose 10%, 25 g, PRN  glucagon (human recombinant), 1 mg, PRN  glucose, 16 g, PRN  glucose, 24 g, PRN  metoclopramide, 5 mg, Q6H PRN  morphine, 4 mg, Q4H PRN  ondansetron, 8 mg, Q8H PRN  oxyCODONE, 10 mg, Q4H PRN      Chart check completed. Will continue plan of care.      Orientation: oriented x 4  Eileen Coma Scale Score: 15     Lead Monitored: Lead II Rhythm: normal sinus rhythm    Cardiac/Telemetry Box Number: [id number]  VTE Required Core Measure: Pharmacological prophylaxis initiated/maintained, (SCDs) Sequential compression device initiated/maintained Last Bowel Movement: 05/09/24  Diet Adult Regular (IDDSI Level 7)  Voiding Characteristics: external catheter  Naresh Score: 18  Fall Risk Score: 10  Accucheck []   Freq?      Lines/Drains/Airways       Drain  Duration                  Chest Tube 05/10/24 1721 Tube - 1 Left Pleural 24 Fr. 1 day              Peripheral Intravenous Line  Duration                  Peripheral IV - Single Lumen 05/10/24 0930 20 G Left Hand 1 day         Peripheral IV - Single Lumen 05/10/24 1827 18 G Right Forearm <1 day                       Problem: Adult Inpatient Plan of Care  Goal: Plan of Care  Review  Outcome: Progressing  Goal: Patient-Specific Goal (Individualized)  Outcome: Progressing  Goal: Absence of Hospital-Acquired Illness or Injury  Outcome: Progressing  Goal: Optimal Comfort and Wellbeing  Outcome: Progressing  Goal: Readiness for Transition of Care  Outcome: Progressing     Problem: Diabetes Comorbidity  Goal: Blood Glucose Level Within Targeted Range  Outcome: Progressing     Problem: Infection  Goal: Absence of Infection Signs and Symptoms  Outcome: Progressing     Problem: Wound  Goal: Optimal Coping  Outcome: Progressing  Goal: Optimal Functional Ability  Outcome: Progressing  Goal: Absence of Infection Signs and Symptoms  Outcome: Progressing  Goal: Improved Oral Intake  Outcome: Progressing  Goal: Optimal Pain Control and Function  Outcome: Progressing  Goal: Skin Health and Integrity  Outcome: Progressing  Goal: Optimal Wound Healing  Outcome: Progressing     Problem: Fall Injury Risk  Goal: Absence of Fall and Fall-Related Injury  Outcome: Progressing     Problem: Skin Injury Risk Increased  Goal: Skin Health and Integrity  Outcome: Progressing

## 2024-05-12 LAB
BASOPHILS # BLD AUTO: 0.03 K/UL (ref 0–0.2)
BASOPHILS NFR BLD: 0.3 % (ref 0–1.9)
DIFFERENTIAL METHOD BLD: ABNORMAL
EOSINOPHIL # BLD AUTO: 0.1 K/UL (ref 0–0.5)
EOSINOPHIL NFR BLD: 0.9 % (ref 0–8)
ERYTHROCYTE [DISTWIDTH] IN BLOOD BY AUTOMATED COUNT: 14 % (ref 11.5–14.5)
HCT VFR BLD AUTO: 33.3 % (ref 37–48.5)
HGB BLD-MCNC: 11.1 G/DL (ref 12–16)
IMM GRANULOCYTES # BLD AUTO: 0.04 K/UL (ref 0–0.04)
IMM GRANULOCYTES NFR BLD AUTO: 0.4 % (ref 0–0.5)
LYMPHOCYTES # BLD AUTO: 2.2 K/UL (ref 1–4.8)
LYMPHOCYTES NFR BLD: 21.3 % (ref 18–48)
MCH RBC QN AUTO: 25.4 PG (ref 27–31)
MCHC RBC AUTO-ENTMCNC: 33.3 G/DL (ref 32–36)
MCV RBC AUTO: 76 FL (ref 82–98)
MONOCYTES # BLD AUTO: 0.8 K/UL (ref 0.3–1)
MONOCYTES NFR BLD: 7.6 % (ref 4–15)
NEUTROPHILS # BLD AUTO: 7.2 K/UL (ref 1.8–7.7)
NEUTROPHILS NFR BLD: 69.5 % (ref 38–73)
NRBC BLD-RTO: 0 /100 WBC
PLATELET # BLD AUTO: 217 K/UL (ref 150–450)
PMV BLD AUTO: 9.8 FL (ref 9.2–12.9)
POCT GLUCOSE: 113 MG/DL (ref 70–110)
POCT GLUCOSE: 99 MG/DL (ref 70–110)
RBC # BLD AUTO: 4.37 M/UL (ref 4–5.4)
WBC # BLD AUTO: 10.37 K/UL (ref 3.9–12.7)

## 2024-05-12 PROCEDURE — 99900035 HC TECH TIME PER 15 MIN (STAT)

## 2024-05-12 PROCEDURE — 21400001 HC TELEMETRY ROOM

## 2024-05-12 PROCEDURE — 63600175 PHARM REV CODE 636 W HCPCS: Performed by: THORACIC SURGERY (CARDIOTHORACIC VASCULAR SURGERY)

## 2024-05-12 PROCEDURE — 27000221 HC OXYGEN, UP TO 24 HOURS

## 2024-05-12 PROCEDURE — 85025 COMPLETE CBC W/AUTO DIFF WBC: CPT | Performed by: THORACIC SURGERY (CARDIOTHORACIC VASCULAR SURGERY)

## 2024-05-12 PROCEDURE — 25000003 PHARM REV CODE 250: Performed by: THORACIC SURGERY (CARDIOTHORACIC VASCULAR SURGERY)

## 2024-05-12 PROCEDURE — 25000242 PHARM REV CODE 250 ALT 637 W/ HCPCS: Performed by: THORACIC SURGERY (CARDIOTHORACIC VASCULAR SURGERY)

## 2024-05-12 PROCEDURE — 27100171 HC OXYGEN HIGH FLOW UP TO 24 HOURS

## 2024-05-12 PROCEDURE — 36415 COLL VENOUS BLD VENIPUNCTURE: CPT | Performed by: THORACIC SURGERY (CARDIOTHORACIC VASCULAR SURGERY)

## 2024-05-12 PROCEDURE — 94761 N-INVAS EAR/PLS OXIMETRY MLT: CPT

## 2024-05-12 PROCEDURE — 94799 UNLISTED PULMONARY SVC/PX: CPT

## 2024-05-12 PROCEDURE — 94640 AIRWAY INHALATION TREATMENT: CPT

## 2024-05-12 RX ORDER — LIDOCAINE 50 MG/G
1 PATCH TOPICAL
Status: DISCONTINUED | OUTPATIENT
Start: 2024-05-12 | End: 2024-05-15 | Stop reason: HOSPADM

## 2024-05-12 RX ORDER — OXYCODONE HYDROCHLORIDE 5 MG/1
5 TABLET ORAL EVERY 4 HOURS PRN
Status: DISCONTINUED | OUTPATIENT
Start: 2024-05-12 | End: 2024-05-15 | Stop reason: HOSPADM

## 2024-05-12 RX ORDER — METOPROLOL TARTRATE 25 MG/1
25 TABLET, FILM COATED ORAL 3 TIMES DAILY
Status: DISCONTINUED | OUTPATIENT
Start: 2024-05-12 | End: 2024-05-13

## 2024-05-12 RX ADMIN — AMITRIPTYLINE HYDROCHLORIDE 100 MG: 50 TABLET, FILM COATED ORAL at 09:05

## 2024-05-12 RX ADMIN — IPRATROPIUM BROMIDE 0.5 MG: 0.5 SOLUTION RESPIRATORY (INHALATION) at 07:05

## 2024-05-12 RX ADMIN — OXYCODONE HYDROCHLORIDE 10 MG: 5 TABLET ORAL at 12:05

## 2024-05-12 RX ADMIN — POLYETHYLENE GLYCOL 3350 17 G: 17 POWDER, FOR SOLUTION ORAL at 08:05

## 2024-05-12 RX ADMIN — IPRATROPIUM BROMIDE 0.5 MG: 0.5 SOLUTION RESPIRATORY (INHALATION) at 04:05

## 2024-05-12 RX ADMIN — CEFAZOLIN 2 G: 2 INJECTION, POWDER, FOR SOLUTION INTRAMUSCULAR; INTRAVENOUS at 05:05

## 2024-05-12 RX ADMIN — IPRATROPIUM BROMIDE 0.5 MG: 0.5 SOLUTION RESPIRATORY (INHALATION) at 08:05

## 2024-05-12 RX ADMIN — METOPROLOL TARTRATE 25 MG: 25 TABLET, FILM COATED ORAL at 08:05

## 2024-05-12 RX ADMIN — METOPROLOL TARTRATE 25 MG: 25 TABLET, FILM COATED ORAL at 09:05

## 2024-05-12 RX ADMIN — IPRATROPIUM BROMIDE 0.5 MG: 0.5 SOLUTION RESPIRATORY (INHALATION) at 03:05

## 2024-05-12 RX ADMIN — LORAZEPAM 1 MG: 1 TABLET ORAL at 09:05

## 2024-05-12 RX ADMIN — FAMOTIDINE 20 MG: 20 TABLET ORAL at 09:05

## 2024-05-12 RX ADMIN — IPRATROPIUM BROMIDE 0.5 MG: 0.5 SOLUTION RESPIRATORY (INHALATION) at 12:05

## 2024-05-12 RX ADMIN — MUPIROCIN 1 G: 20 OINTMENT TOPICAL at 08:05

## 2024-05-12 RX ADMIN — DEXTROSE, SODIUM CHLORIDE, AND POTASSIUM CHLORIDE: 5; .45; .15 INJECTION INTRAVENOUS at 01:05

## 2024-05-12 RX ADMIN — LORAZEPAM 1 MG: 1 TABLET ORAL at 08:05

## 2024-05-12 RX ADMIN — ENOXAPARIN SODIUM 40 MG: 40 INJECTION SUBCUTANEOUS at 04:05

## 2024-05-12 RX ADMIN — FAMOTIDINE 20 MG: 20 TABLET ORAL at 08:05

## 2024-05-12 RX ADMIN — MUPIROCIN 1 G: 20 OINTMENT TOPICAL at 09:05

## 2024-05-12 RX ADMIN — CEFAZOLIN 2 G: 2 INJECTION, POWDER, FOR SOLUTION INTRAMUSCULAR; INTRAVENOUS at 12:05

## 2024-05-12 RX ADMIN — IPRATROPIUM BROMIDE 0.5 MG: 0.5 SOLUTION RESPIRATORY (INHALATION) at 11:05

## 2024-05-12 RX ADMIN — ESOMEPRAZOLE MAGNESIUM 10 MG: 2.5 GRANULE, DELAYED RELEASE ORAL at 05:05

## 2024-05-12 RX ADMIN — LIDOCAINE 1 PATCH: 50 PATCH CUTANEOUS at 08:05

## 2024-05-12 NOTE — PLAN OF CARE
[facility] - Telemetry (Hospital)  Initial Discharge Assessment       Primary Care Provider: Emil Zhang MD    Admission Diagnosis: Malignant neoplasm of lower lobe of left lung [C34.32]  Bilateral carpal tunnel syndrome [G56.03]    Admission Date: 5/10/2024  Expected Discharge Date:     Transition of Care Barriers: None    Payor: [facility] MCARE / Plan: [employer] [facility] MEDICARE ADVANTAGE / Product Type: Medicare Advantage /     Extended Emergency Contact Information  Primary Emergency Contact: Claude Monroy   [place]  Mobile Phone: [phone]  Relation: Son  Secondary Emergency Contact: Joseph Diez  Mobile Phone: [phone]  Relation: Relative    Discharge Plan A: Home  Discharge Plan B: Home with family      [facility] [address]  [address] [facility] [address]  [facility] [address]  Phone: [phone] Fax: [phone]    [facility]  [address] [place] [address] Dr Lawson [address] [place] [id number]  Phone: [phone] Fax: [phone]      Initial Assessment (most recent)       Adult Discharge Assessment - 05/12/24 1122          Discharge Assessment    Assessment Type Discharge Planning Assessment     Confirmed/corrected address, phone number and insurance Yes     Confirmed Demographics Correct on Facesheet     Source of Information patient     Does patient/caregiver understand observation status Yes     Reason For Admission biospy     People in Home alone     Do you expect to return to your current living situation? Yes     Do you have help at home or someone to help you manage your care at home? Yes     Who are your caregiver(s) and their phone number(s)? gwendolyn Rea [phone]     Prior to hospitilization cognitive status: Alert/Oriented     Current cognitive status: Alert/Oriented     Walking or Climbing Stairs Difficulty no     Dressing/Bathing Difficulty no     Equipment Currently Used at Home none      Readmission within 30 days? No     Patient currently being followed by outpatient case management? No     Do you currently have service(s) that help you manage your care at home? No     Do you take prescription medications? Yes     Do you have prescription coverage? Yes     Coverage [employer]     Do you have any problems affording any of your prescribed medications? No     Is the patient taking medications as prescribed? yes     Who is going to help you get home at discharge? Claude Monroy, son [phone]     How do you get to doctors appointments? family or friend will provide     Are you on dialysis? No     Do you take coumadin? No     Discharge Plan A Home     Discharge Plan B Home with family     DME Needed Upon Discharge  oxygen     Discharge Plan discussed with: Patient;Adult children     Transition of Care Barriers None                   Swer met with pt and son at the bedside to complete d/c assessment. Pt report no needs at this time. Son available at d/c. ELIZABETH, ALFRED

## 2024-05-12 NOTE — PROGRESS NOTES
Subjective:      Patient ID: Cassandra Campos is a 73 y.o. female.    Chief Complaint: No chief complaint on file.    HPI:  73-year-old female who has a ex-smoker found to have a left upper lobe pulmonary nodule had a biopsy which showed adenocarcinoma.  Patient has history of hypertension type 2 diabetes mellitus and hyperlipidemia.  No history of cough no history of hemoptysis at this time.  She had a full workup including a PET scan which showed left upper lobe lesion which has high activity and a mediastinal lymph node.  She underwent biopsy for mediastinal lymph node with an EBUS which came back as negative for malignancy.  Date of Procedure: 5/10/2024      Procedure: Procedure(s) (LRB):  XI ROBOTIC RATS,WITH LOBECTOMY,LUNG (Left)  EXCISION, LYMPH NODE (Left)  BLOCK, NERVE, INTERCOSTAL, 2 OR MORE  Lingulectomy   The external lymph node dissection     Surgeons and Role:     * Mike Nuñez MD - Primary     Assisting Surgeon: None     Pre-Operative Diagnosis: Malignant neoplasm of upper lobe of left lung [C34.32]  Adenocarcinoma of the left upper lobe  Hypertension  Hyperlipidemia  Obesity  Sleep apnea  COPD  Type 2 diabetes mellitus  Depression     Post-Operative Diagnosis:   Same    Pain overnight chest tube draining serosanguineous drainage no air leak chest tube to water seal this morning incentive spirometry less than 500 mostly splinting from the pain.    05/12/2024 the patient had urinary retention overnight for which the Medina was reintroduced she is still having pain issues chest tube draining minimal fluid.  Issues with ambulation    Review of patient's allergies indicates:  No Known Allergies    Past Medical History:   Diagnosis Date    Arthritis     hands    Bilateral bunions     Borderline glaucoma     De Quervain's disease (radial styloid tenosynovitis)     Gastritis     upper GI 2/2017    Hydradenitis     Hyperlipidemia     Hypertension     Insomnia     Migraines 02/01/2000    Nasal septum  perforation     Obesity     Pneumonia     Restrictive airway disease     Sleep apnea     SVT (supraventricular tachycardia) 2013    Trigger finger     Type 2 diabetes mellitus      am 2024       Family History   Problem Relation Name Age of Onset    Prostate cancer Brother      Diabetes Maternal Aunt Alondra Campos     Diabetes Cousin      Hypertension Maternal Grandmother Portia Orosco        Social History     Socioeconomic History    Marital status:     Number of children: 1   Occupational History    Occupation:  aid   Tobacco Use    Smoking status: Former     Current packs/day: 0.00     Average packs/day: 0.5 packs/day for 42.0 years (21.0 ttl pk-yrs)     Types: Cigarettes     Start date: 1970     Quit date: 2012     Years since quittin.3     Passive exposure: Past    Smokeless tobacco: Never   Substance and Sexual Activity    Alcohol use: Not Currently     Alcohol/week: 1.0 standard drink of alcohol     Types: 1 Glasses of wine per week     Comment: Glass red wine once a every 2 weeks    Drug use: Never    Sexual activity: Not Currently     Partners: Female     Birth control/protection: Abstinence, None   Social History Narrative    Single, part-time teacher. Masters degree biology.      Social Determinants of Health     Financial Resource Strain: Low Risk  (2023)    Overall Financial Resource Strain (CARDIA)     Difficulty of Paying Living Expenses: Not hard at all   Food Insecurity: Food Insecurity Present (2023)    Hunger Vital Sign     Worried About Running Out of Food in the Last Year: Never true     Ran Out of Food in the Last Year: Sometimes true   Transportation Needs: No Transportation Needs (2023)    PRAPARE - Transportation     Lack of Transportation (Medical): No     Lack of Transportation (Non-Medical): No   Physical Activity: Insufficiently Active (2023)    Exercise Vital Sign     Days of Exercise per Week: 3 days      Minutes of Exercise per Session: 20 min   Stress: No Stress Concern Present (2023)    [employer] - Occupational Stress Questionnaire     Feeling of Stress : Not at all   Housing Stability: Low Risk  (2023)    Housing Stability Vital Sign     Unable to Pay for Housing in the Last Year: No     Number of Places Lived in the Last Year: 1     Unstable Housing in the Last Year: No       Past Surgical History:   Procedure Laterality Date    AXILLARY HIDRADENITIS EXCISION Bilateral     BONE EXOSTOSIS EXCISION Right 2018    Procedure: EXCISION, EXOSTOSIS;  Surgeon: Jayro Pedraza Sr., MD;  Location: [facility];  Service: Orthopedics;  Laterality: Right;    BREAST BIOPSY Bilateral     both benign    BREAST SURGERY  1998    CARPAL TUNNEL RELEASE      bilateral    CARPAL TUNNEL RELEASE Right 2024    Procedure: RELEASE, CARPAL TUNNEL;  Surgeon: Jaime Oswald MD;  Location: [facility];  Service: Orthopedics;  Laterality: Right;    CARPAL TUNNEL RELEASE Left 2024    Procedure: RELEASE, CARPAL TUNNEL;  Surgeon: Jaime Oswald MD;  Location: [facility];  Service: Orthopedics;  Laterality: Left;    CATARACT EXTRACTION Bilateral     OU     SECTION  1979    CHOLECYSTECTOMY  2014    COLONOSCOPY N/A 10/02/2020    Procedure: COLONOSCOPY;  Surgeon: Tushar Edwards MD;  Location: [facility];  Service: Endoscopy;  Laterality: N/A;    COLONOSCOPY W/ POLYPECTOMY  10/02/2020    Polyps x3, repeat 5 years; Tushar Edwards MD     CYST REMOVAL  2015    sebaceous cyst removed from face    DE QUERVAIN'S RELEASE Left 2020    Procedure: RELEASE, HAND, FOR DEQUERVAIN'S TENOSYNOVITIS;  Surgeon: Jaime Oswald MD;  Location: [facility] OR;  Service: Orthopedics;  Laterality: Left;    DE QUERVAIN'S RELEASE Right 2020    Procedure: RELEASE, HAND, FOR DEQUERVAIN'S TENOSYNOVITIS;  Surgeon: Jaime Oswald MD;  Location: [facility];  Service: Orthopedics;   Laterality: Right;    ENDOBRONCHIAL ULTRASOUND Bilateral 04/10/2024    Procedure: ENDOBRONCHIAL ULTRASOUND (EBUS);  Surgeon: Adrian Robin MD;  Location: [facility];  Service: Pulmonary;  Laterality: Bilateral;    EYE SURGERY      gastric sleeve  02/13/2017    Dr. Watson    KNEE SURGERY Right     OLECRANON BURSECTOMY Right 07/25/2018    Procedure: BURSECTOMY, OLECRANON;  Surgeon: Jayro Pedraza Sr., MD;  Location: [facility] [place] [facility];  Service: Orthopedics;  Laterality: Right;    SURGICAL REMOVAL OF BUNION WITH OSTEOTOMY OF METATARSAL BONE Left 05/10/2019    Procedure: BUNIONECTOMY, WITH METATARSAL OSTEOTOMY;  Surgeon: Srinivasan Villanueva DPM;  Location: [facility] [place] [facility];  Service: Podiatry;  Laterality: Left;    SURGICAL REMOVAL OF BUNION WITH OSTEOTOMY OF METATARSAL BONE Right 06/28/2019    Procedure: BUNIONECTOMY, WITH METATARSAL OSTEOTOMY;  Surgeon: Srinivasan Villanueva DPM;  Location: [facility] [place] [facility];  Service: Podiatry;  Laterality: Right;    TONSILLECTOMY, ADENOIDECTOMY  1980s    TRIGGER FINGER RELEASE Right 04/01/2015    Dr. Pedraza       Review of Systems   Constitutional:  Negative for activity change and appetite change.   HENT:  Negative for dental problem, nosebleeds and sore throat.    Eyes:  Negative for discharge and visual disturbance.   Respiratory:  Positive for chest tightness and shortness of breath. Negative for cough and stridor.    Cardiovascular:  Negative for leg swelling.   Gastrointestinal:  Negative for abdominal distention and abdominal pain.   Genitourinary:  Negative for difficulty urinating and dysuria.   Musculoskeletal:  Negative for arthralgias, back pain and joint swelling.   Allergic/Immunologic: Negative for environmental allergies.   Neurological:  Negative for dizziness, syncope and headaches.   Hematological:  Does not bruise/bleed easily.   Psychiatric/Behavioral:  Negative for behavioral problems.           Objective:   /62 (BP Location: Left arm, Patient Position: Lying)   Pulse 103   Temp 98.4 °F  "(36.9 °C) (Axillary)   Resp 16   Ht 5' 3" (1.6 m)   Wt 84.1 kg (185 lb 6.5 oz)   SpO2 95%   Breastfeeding No   BMI 32.84 kg/m²     X-Ray Chest 1 View  Narrative: EXAMINATION:  XR CHEST 1 VIEW    CLINICAL HISTORY:  pneumothorax; Localized enlarged lymph nodes    TECHNIQUE:  Single frontal view of the chest was performed.    COMPARISON:  Multiple priors    FINDINGS:  Left chest tube in place.  No residual pneumothorax identified.  Atelectatic opacities bilaterally.  Impression: As above.    Electronically signed by: Satish Jarvis  Date:    05/11/2024  Time:    09:48         Physical Exam  Vitals reviewed.   Constitutional:       Appearance: Normal appearance.   HENT:      Head: Normocephalic and atraumatic.      Mouth/Throat:      Mouth: Mucous membranes are moist.   Eyes:      Extraocular Movements: Extraocular movements intact.   Cardiovascular:      Rate and Rhythm: Normal rate and regular rhythm.      Pulses: Normal pulses.      Heart sounds: Normal heart sounds.      Comments: Chest tube in place draining serosanguineous liquid no air leak  Pulmonary:      Effort: Pulmonary effort is normal.      Breath sounds: Rhonchi and rales present.   Abdominal:      Palpations: Abdomen is soft.   Musculoskeletal:         General: Normal range of motion.      Cervical back: Normal range of motion and neck supple.   Skin:     General: Skin is warm.      Capillary Refill: Capillary refill takes less than 2 seconds.   Neurological:      General: No focal deficit present.      Mental Status: She is alert and oriented to person, place, and time.   Psychiatric:         Mood and Affect: Mood normal.     Chest x-ray shows bilateral atelectasis postsurgical changes.    Assessment:     1. Chronic bronchitis, unspecified chronic bronchitis type    2. Malignant neoplasm of lower lobe of left lung    3. Bilateral carpal tunnel syndrome    4. Hilar adenopathy    5. Solitary pulmonary nodule  "   Obesity  Hypertension  Hyperlipidemia  Depression  Sleep apnea      Plan   Postop day 2status post rats left-sided lingulectomy and mediastinal lymph node dissection for a adenocarcinoma.  Neuro Pain control as needed on oxycodone p.r.n.  Cardio vascular staple beta-blocker  Respiratory aggressive pulmonary toilet nebulizers out of bed ambulate incentive spirometry Pain con  Chest tube out this morning  PTOT out of bed ambulate.  GI regular diet  We will discontinue the Medina this morning  DVT prophylaxis on Lovenox  GI prophylaxis on esomeprazole  Restarted her home meds  Chest x-ray tomorrow  Planning discharge tomorrow              Mike Nuñez MD  [facility]  [place]

## 2024-05-13 LAB
BASOPHILS # BLD AUTO: 0.04 K/UL (ref 0–0.2)
BASOPHILS NFR BLD: 0.4 % (ref 0–1.9)
DIFFERENTIAL METHOD BLD: ABNORMAL
EOSINOPHIL # BLD AUTO: 0.1 K/UL (ref 0–0.5)
EOSINOPHIL NFR BLD: 0.5 % (ref 0–8)
ERYTHROCYTE [DISTWIDTH] IN BLOOD BY AUTOMATED COUNT: 13.9 % (ref 11.5–14.5)
FUNGUS SPEC CULT: NORMAL
HCT VFR BLD AUTO: 33.9 % (ref 37–48.5)
HGB BLD-MCNC: 11.3 G/DL (ref 12–16)
IMM GRANULOCYTES # BLD AUTO: 0.03 K/UL (ref 0–0.04)
IMM GRANULOCYTES NFR BLD AUTO: 0.3 % (ref 0–0.5)
LYMPHOCYTES # BLD AUTO: 1.8 K/UL (ref 1–4.8)
LYMPHOCYTES NFR BLD: 19.8 % (ref 18–48)
MCH RBC QN AUTO: 25.5 PG (ref 27–31)
MCHC RBC AUTO-ENTMCNC: 33.3 G/DL (ref 32–36)
MCV RBC AUTO: 77 FL (ref 82–98)
MONOCYTES # BLD AUTO: 0.7 K/UL (ref 0.3–1)
MONOCYTES NFR BLD: 7.3 % (ref 4–15)
NEUTROPHILS # BLD AUTO: 6.7 K/UL (ref 1.8–7.7)
NEUTROPHILS NFR BLD: 71.7 % (ref 38–73)
NRBC BLD-RTO: 0 /100 WBC
PLATELET # BLD AUTO: 237 K/UL (ref 150–450)
PMV BLD AUTO: 9.6 FL (ref 9.2–12.9)
POCT GLUCOSE: 120 MG/DL (ref 70–110)
POCT GLUCOSE: 130 MG/DL (ref 70–110)
POCT GLUCOSE: 164 MG/DL (ref 70–110)
RBC # BLD AUTO: 4.43 M/UL (ref 4–5.4)
WBC # BLD AUTO: 9.3 K/UL (ref 3.9–12.7)

## 2024-05-13 PROCEDURE — 85025 COMPLETE CBC W/AUTO DIFF WBC: CPT | Performed by: THORACIC SURGERY (CARDIOTHORACIC VASCULAR SURGERY)

## 2024-05-13 PROCEDURE — 97530 THERAPEUTIC ACTIVITIES: CPT | Mod: CQ

## 2024-05-13 PROCEDURE — 94640 AIRWAY INHALATION TREATMENT: CPT

## 2024-05-13 PROCEDURE — 21400001 HC TELEMETRY ROOM

## 2024-05-13 PROCEDURE — 27100171 HC OXYGEN HIGH FLOW UP TO 24 HOURS

## 2024-05-13 PROCEDURE — 25000242 PHARM REV CODE 250 ALT 637 W/ HCPCS: Performed by: THORACIC SURGERY (CARDIOTHORACIC VASCULAR SURGERY)

## 2024-05-13 PROCEDURE — 99900035 HC TECH TIME PER 15 MIN (STAT)

## 2024-05-13 PROCEDURE — 97116 GAIT TRAINING THERAPY: CPT | Mod: CQ

## 2024-05-13 PROCEDURE — 63600175 PHARM REV CODE 636 W HCPCS: Performed by: THORACIC SURGERY (CARDIOTHORACIC VASCULAR SURGERY)

## 2024-05-13 PROCEDURE — 36415 COLL VENOUS BLD VENIPUNCTURE: CPT | Performed by: THORACIC SURGERY (CARDIOTHORACIC VASCULAR SURGERY)

## 2024-05-13 PROCEDURE — 25000003 PHARM REV CODE 250: Performed by: THORACIC SURGERY (CARDIOTHORACIC VASCULAR SURGERY)

## 2024-05-13 PROCEDURE — 94799 UNLISTED PULMONARY SVC/PX: CPT

## 2024-05-13 PROCEDURE — 94761 N-INVAS EAR/PLS OXIMETRY MLT: CPT

## 2024-05-13 RX ORDER — METOPROLOL TARTRATE 25 MG/1
25 TABLET, FILM COATED ORAL ONCE
Status: COMPLETED | OUTPATIENT
Start: 2024-05-13 | End: 2024-05-13

## 2024-05-13 RX ORDER — ACETAMINOPHEN 325 MG/1
650 TABLET ORAL EVERY 6 HOURS PRN
Status: DISCONTINUED | OUTPATIENT
Start: 2024-05-13 | End: 2024-05-15 | Stop reason: HOSPADM

## 2024-05-13 RX ORDER — METOPROLOL TARTRATE 50 MG/1
50 TABLET ORAL 3 TIMES DAILY
Status: DISCONTINUED | OUTPATIENT
Start: 2024-05-13 | End: 2024-05-14

## 2024-05-13 RX ADMIN — METOPROLOL TARTRATE 25 MG: 25 TABLET, FILM COATED ORAL at 04:05

## 2024-05-13 RX ADMIN — METOPROLOL TARTRATE 50 MG: 50 TABLET, FILM COATED ORAL at 09:05

## 2024-05-13 RX ADMIN — LIDOCAINE 1 PATCH: 50 PATCH CUTANEOUS at 09:05

## 2024-05-13 RX ADMIN — IPRATROPIUM BROMIDE 0.5 MG: 0.5 SOLUTION RESPIRATORY (INHALATION) at 03:05

## 2024-05-13 RX ADMIN — ACETAMINOPHEN 650 MG: 325 TABLET ORAL at 04:05

## 2024-05-13 RX ADMIN — IPRATROPIUM BROMIDE 0.5 MG: 0.5 SOLUTION RESPIRATORY (INHALATION) at 07:05

## 2024-05-13 RX ADMIN — METOPROLOL TARTRATE 25 MG: 25 TABLET, FILM COATED ORAL at 09:05

## 2024-05-13 RX ADMIN — MUPIROCIN 1 G: 20 OINTMENT TOPICAL at 09:05

## 2024-05-13 RX ADMIN — IPRATROPIUM BROMIDE 0.5 MG: 0.5 SOLUTION RESPIRATORY (INHALATION) at 04:05

## 2024-05-13 RX ADMIN — IPRATROPIUM BROMIDE 0.5 MG: 0.5 SOLUTION RESPIRATORY (INHALATION) at 12:05

## 2024-05-13 RX ADMIN — AMITRIPTYLINE HYDROCHLORIDE 100 MG: 50 TABLET, FILM COATED ORAL at 09:05

## 2024-05-13 RX ADMIN — ESOMEPRAZOLE MAGNESIUM 10 MG: 2.5 GRANULE, DELAYED RELEASE ORAL at 06:05

## 2024-05-13 RX ADMIN — FAMOTIDINE 20 MG: 20 TABLET ORAL at 09:05

## 2024-05-13 RX ADMIN — POLYETHYLENE GLYCOL 3350 17 G: 17 POWDER, FOR SOLUTION ORAL at 09:05

## 2024-05-13 RX ADMIN — ENOXAPARIN SODIUM 40 MG: 40 INJECTION SUBCUTANEOUS at 04:05

## 2024-05-13 NOTE — PLAN OF CARE
1520:Discussed possible placement due to therapy and oxygen weaning with patient at bedside. Patient declines placement. Advised oxygen needs must be lower than 10LPM prior to her DCing home. Multiple referrals sent for home health. Patient will discharge to her family's home at [address] [place] [address]    1557: [facility] health accepted. CM will follow up in the morning with any other accepting agencies and evaluate oxygen needs.

## 2024-05-13 NOTE — NURSING
No more adverse events with patient's oxygen level dropping throughout the night. Purposeful monitoring continued. Oxygen levels have been good; pt is still on nonrebreather   Tiago Beck(Attending)

## 2024-05-13 NOTE — ASSESSMENT & PLAN NOTE
- HR noted to be 130-140 since around [place], this is about same time she required increased O2 to maintain sats per chart review  - additional BB given per primary team, continue to monitor and treat as needed to achieve controlled HR, goal HR <100  - controlled HR will likely help reduce O2 requirements  - does not appear volume up on exam, echo from 5/1 with EF 55% and normal diastolic function    - continue telemetry monitoring

## 2024-05-13 NOTE — ASSESSMENT & PLAN NOTE
- admitted 5/10 per CTS for planned surgical intervention, pt s/p lingulectomy and external lymph node dissection and left sided CT since removed  - CXR with atelectasis, no effusion or other process appreciated   - instructed on how to use IS correctly, poor effort with return demonstration, weak cough  - consulted PT/OT, pt needs to be OOB with meals, mobilization, pulmonary hygiene measures   - pt does not appear to be very motivated currently, will follow up closely tomorrow, all questions answered

## 2024-05-13 NOTE — NURSING
Nurse went in around midnight to do vitals. Pt's Oxygen level was in the 70s and her breathing was labored. Pt' was already on oxygen via nasal cannula.  Pt's voiced no other c/o. Nurse turned oxygen level up to 6. Charge nurse was called and respiratory. Charge Nurse spoke with provider who ordered chest x-ray. Respiratory placed a nonrebreather mask on patient with oxygen flow going at max level and oxygen levels returned to normal. Pt was placed on continuous pulse ox to continue monitoring.

## 2024-05-13 NOTE — RESPIRATORY THERAPY
Pt was titrated from NRB to HFNC, spoke with MD Savannah and stated to move home oxygen evaluation to later date due to pt oxygen needs. Pt will be continually weaned from oxygen.

## 2024-05-13 NOTE — PROGRESS NOTES
Subjective:      Patient ID: Cassandra Campos is a 73 y.o. female.    Chief Complaint: No chief complaint on file.    HPI:  73-year-old female who has a ex-smoker found to have a left upper lobe pulmonary nodule had a biopsy which showed adenocarcinoma.  Patient has history of hypertension type 2 diabetes mellitus and hyperlipidemia.  No history of cough no history of hemoptysis at this time.  She had a full workup including a PET scan which showed left upper lobe lesion which has high activity and a mediastinal lymph node.  She underwent biopsy for mediastinal lymph node with an EBUS which came back as negative for malignancy.  Date of Procedure: 5/10/2024      Procedure: Procedure(s) (LRB):  XI ROBOTIC RATS,WITH LOBECTOMY,LUNG (Left)  EXCISION, LYMPH NODE (Left)  BLOCK, NERVE, INTERCOSTAL, 2 OR MORE  Lingulectomy   The external lymph node dissection     Surgeons and Role:     * Mike Nuñez MD - Primary     Assisting Surgeon: None     Pre-Operative Diagnosis: Malignant neoplasm of upper lobe of left lung [C34.32]  Adenocarcinoma of the left upper lobe  Hypertension  Hyperlipidemia  Obesity  Sleep apnea  COPD  Type 2 diabetes mellitus  Depression     Post-Operative Diagnosis:   Same    Pain overnight chest tube draining serosanguineous drainage no air leak chest tube to water seal this morning incentive spirometry less than 500 mostly splinting from the pain.    05/12/2024 the patient had urinary retention overnight for which the Medina was reintroduced she is still having pain issues chest tube draining minimal fluid.  Issues with ambulation    05/13/2024 patient had a episode of desaturate last night for which she was put on non-rebreather chest x-ray showed no evidence of a pneumothorax was stable in the morning still requiring high oxygen incentive spirometry 500.    Review of patient's allergies indicates:  No Known Allergies    Past Medical History:   Diagnosis Date    Arthritis     hands    Bilateral  bunions     Borderline glaucoma     De Quervain's disease (radial styloid tenosynovitis)     Gastritis     upper GI 2017    Hydradenitis     Hyperlipidemia     Hypertension     Insomnia     Migraines 2000    Nasal septum perforation     Obesity     Pneumonia     Restrictive airway disease     Sleep apnea     SVT (supraventricular tachycardia) 2013    Trigger finger     Type 2 diabetes mellitus 2012     am 2024       Family History   Problem Relation Name Age of Onset    Prostate cancer Brother      Diabetes Maternal Aunt Alondra Campos     Diabetes Cousin      Hypertension Maternal Grandmother Portia Orsoco        Social History     Socioeconomic History    Marital status:     Number of children: 1   Occupational History    Occupation:  aid   Tobacco Use    Smoking status: Former     Current packs/day: 0.00     Average packs/day: 0.5 packs/day for 42.0 years (21.0 ttl pk-yrs)     Types: Cigarettes     Start date: 1970     Quit date: 2012     Years since quittin.3     Passive exposure: Past    Smokeless tobacco: Never   Substance and Sexual Activity    Alcohol use: Not Currently     Alcohol/week: 1.0 standard drink of alcohol     Types: 1 Glasses of wine per week     Comment: Glass red wine once a every 2 weeks    Drug use: Never    Sexual activity: Not Currently     Partners: Female     Birth control/protection: Abstinence, None   Social History Narrative    Single, part-time teacher. Masters degree biology.      Social Determinants of Health     Financial Resource Strain: Low Risk  (2023)    Overall Financial Resource Strain (CARDIA)     Difficulty of Paying Living Expenses: Not hard at all   Food Insecurity: Food Insecurity Present (2023)    Hunger Vital Sign     Worried About Running Out of Food in the Last Year: Never true     Ran Out of Food in the Last Year: Sometimes true   Transportation Needs: No Transportation Needs (2023)     PRAPARE - Transportation     Lack of Transportation (Medical): No     Lack of Transportation (Non-Medical): No   Physical Activity: Insufficiently Active (2023)    Exercise Vital Sign     Days of Exercise per Week: 3 days     Minutes of Exercise per Session: 20 min   Stress: No Stress Concern Present (2023)    [employer] [place] [employer] - Occupational Stress Questionnaire     Feeling of Stress : Not at all   Housing Stability: Low Risk  (2023)    Housing Stability Vital Sign     Unable to Pay for Housing in the Last Year: No     Number of Places Lived in the Last Year: 1     Unstable Housing in the Last Year: No       Past Surgical History:   Procedure Laterality Date    AXILLARY HIDRADENITIS EXCISION Bilateral     BONE EXOSTOSIS EXCISION Right 2018    Procedure: EXCISION, EXOSTOSIS;  Surgeon: Jayro Pedraza Sr., MD;  Location: [facility];  Service: Orthopedics;  Laterality: Right;    BREAST BIOPSY Bilateral     both benign    BREAST SURGERY  1998    CARPAL TUNNEL RELEASE      bilateral    CARPAL TUNNEL RELEASE Right 2024    Procedure: RELEASE, CARPAL TUNNEL;  Surgeon: Jaime Oswald MD;  Location: [facility];  Service: Orthopedics;  Laterality: Right;    CARPAL TUNNEL RELEASE Left 2024    Procedure: RELEASE, CARPAL TUNNEL;  Surgeon: Jaime Oswald MD;  Location: [facility];  Service: Orthopedics;  Laterality: Left;    CATARACT EXTRACTION Bilateral     OU     SECTION  1979    CHOLECYSTECTOMY  2014    COLONOSCOPY N/A 10/02/2020    Procedure: COLONOSCOPY;  Surgeon: Tushar Edwards MD;  Location: [facility];  Service: Endoscopy;  Laterality: N/A;    COLONOSCOPY W/ POLYPECTOMY  10/02/2020    Polyps x3, repeat 5 years; Tushar Edwards MD     CYST REMOVAL  2015    sebaceous cyst removed from face    DE QUERVAIN'S RELEASE Left 2020    Procedure: RELEASE, HAND, FOR DEQUERVAIN'S TENOSYNOVITIS;  Surgeon: Jaime Oswald MD;  Location:  [facility] OR;  Service: Orthopedics;  Laterality: Left;    DE QUERVAIN'S RELEASE Right 11/20/2020    Procedure: RELEASE, HAND, FOR DEQUERVAIN'S TENOSYNOVITIS;  Surgeon: Jaime Oswald MD;  Location: [facility] OR;  Service: Orthopedics;  Laterality: Right;    ENDOBRONCHIAL ULTRASOUND Bilateral 04/10/2024    Procedure: ENDOBRONCHIAL ULTRASOUND (EBUS);  Surgeon: Adrian Robin MD;  Location: [facility];  Service: Pulmonary;  Laterality: Bilateral;    EYE SURGERY      gastric sleeve  02/13/2017    Dr. Watson    KNEE SURGERY Right     OLECRANON BURSECTOMY Right 07/25/2018    Procedure: BURSECTOMY, OLECRANON;  Surgeon: Jayro Pedraza Sr., MD;  Location: [facility] OR;  Service: Orthopedics;  Laterality: Right;    SURGICAL REMOVAL OF BUNION WITH OSTEOTOMY OF METATARSAL BONE Left 05/10/2019    Procedure: BUNIONECTOMY, WITH METATARSAL OSTEOTOMY;  Surgeon: Srinivasan Villanueva DPM;  Location: [facility] OR;  Service: Podiatry;  Laterality: Left;    SURGICAL REMOVAL OF BUNION WITH OSTEOTOMY OF METATARSAL BONE Right 06/28/2019    Procedure: BUNIONECTOMY, WITH METATARSAL OSTEOTOMY;  Surgeon: Srinivasan Villanueva DPM;  Location: [facility] OR;  Service: Podiatry;  Laterality: Right;    TONSILLECTOMY, ADENOIDECTOMY  1980s    TRIGGER FINGER RELEASE Right 04/01/2015    Dr. Pedraza       Review of Systems   Constitutional:  Negative for activity change and appetite change.   HENT:  Negative for dental problem, nosebleeds and sore throat.    Eyes:  Negative for discharge and visual disturbance.   Respiratory:  Positive for chest tightness and shortness of breath. Negative for cough and stridor.    Cardiovascular:  Negative for leg swelling.   Gastrointestinal:  Negative for abdominal distention and abdominal pain.   Genitourinary:  Negative for difficulty urinating and dysuria.   Musculoskeletal:  Negative for arthralgias, back pain and joint swelling.   Allergic/Immunologic: Negative for environmental allergies.   Neurological:  Negative for dizziness, syncope  "and headaches.   Hematological:  Does not bruise/bleed easily.   Psychiatric/Behavioral:  Negative for behavioral problems.           Objective:   BP (!) 103/59 (Patient Position: Lying)   Pulse (!) 140   Temp 97.7 °F (36.5 °C) (Tympanic)   Resp 18   Ht 5' 3" (1.6 m)   Wt 84.1 kg (185 lb 6.5 oz)   SpO2 95%   Breastfeeding No   BMI 32.84 kg/m²     X-Ray Chest 1 View  Narrative: EXAMINATION:  XR CHEST 1 VIEW    CLINICAL HISTORY:  SOB, congestion, low o2 sats;    TECHNIQUE:  Single frontal view of the chest was performed.    COMPARISON:  05/11/2024    FINDINGS:  Left-sided chest tube is no longer visualized.  No definitive or detectable pneumothorax.  Moderate left lower lung and right mid lung airspace disease and/or atelectasis.  Normal heart size.  Impression: As above.    Electronically signed by: Tres Nicole  Date:    05/13/2024  Time:    00:42         Physical Exam  Vitals reviewed.   Constitutional:       Appearance: Normal appearance.   HENT:      Head: Normocephalic and atraumatic.      Mouth/Throat:      Mouth: Mucous membranes are moist.   Eyes:      Extraocular Movements: Extraocular movements intact.   Cardiovascular:      Rate and Rhythm: Normal rate and regular rhythm.      Pulses: Normal pulses.      Heart sounds: Normal heart sounds.      Comments: Chest tube in place draining serosanguineous liquid no air leak  Pulmonary:      Effort: Pulmonary effort is normal.      Breath sounds: Rhonchi and rales present.   Abdominal:      Palpations: Abdomen is soft.   Musculoskeletal:         General: Normal range of motion.      Cervical back: Normal range of motion and neck supple.   Skin:     General: Skin is warm.      Capillary Refill: Capillary refill takes less than 2 seconds.   Neurological:      General: No focal deficit present.      Mental Status: She is alert and oriented to person, place, and time.   Psychiatric:         Mood and Affect: Mood normal.     Chest x-ray shows bilateral " atelectasis postsurgical changes.    Assessment:     1. Solitary pulmonary nodule    2. Chronic bronchitis, unspecified chronic bronchitis type    3. Malignant neoplasm of lower lobe of left lung    4. Bilateral carpal tunnel syndrome    5. Hilar adenopathy    Obesity  Hypertension  Hyperlipidemia  Depression  Sleep apnea      Plan   Postop day 3 status post rats left-sided lingulectomy and mediastinal lymph node dissection for a adenocarcinoma.  Neuro Pain control as needed on oxycodone p.r.n.  Cardio vascular staple beta-blocker  Respiratory aggressive pulmonary toilet nebulizers out of bed ambulate incentive spirometry Pain con  Chest tube out   PTOT out of bed ambulate.  GI regular diet  DVT prophylaxis on Lovenox  GI prophylaxis on esomeprazole  Restarted her home meds  Chest x-ray tomorrow  May need home oxygen and home health with  and  working  Planning discharge tomorrow              Mike Nuñez MD  [facility]  [place]

## 2024-05-13 NOTE — ASSESSMENT & PLAN NOTE
Body mass index is 32.84 kg/m². Morbid obesity complicates all aspects of disease management from diagnostic modalities to treatment. Weight loss encouraged and health benefits explained to patient.   - pt would benefit greatly from weight loss and lifestyle modifications

## 2024-05-13 NOTE — SUBJECTIVE & OBJECTIVE
Past Medical History:   Diagnosis Date    Arthritis     hands    Bilateral bunions     Borderline glaucoma     De Quervain's disease (radial styloid tenosynovitis)     Gastritis     upper GI 2017    Hydradenitis     Hyperlipidemia     Hypertension     Insomnia     Migraines 2000    Nasal septum perforation     Obesity     Pneumonia     Restrictive airway disease     Sleep apnea     SVT (supraventricular tachycardia) 2013    Trigger finger     Type 2 diabetes mellitus      am 2024       Past Surgical History:   Procedure Laterality Date    AXILLARY HIDRADENITIS EXCISION Bilateral     BONE EXOSTOSIS EXCISION Right 2018    Procedure: EXCISION, EXOSTOSIS;  Surgeon: Jayro Pedraza Sr., MD;  Location: [facility];  Service: Orthopedics;  Laterality: Right;    BREAST BIOPSY Bilateral     both benign    BREAST SURGERY  1998    CARPAL TUNNEL RELEASE      bilateral    CARPAL TUNNEL RELEASE Right 2024    Procedure: RELEASE, CARPAL TUNNEL;  Surgeon: Jaime Oswald MD;  Location: [facility] OR;  Service: Orthopedics;  Laterality: Right;    CARPAL TUNNEL RELEASE Left 2024    Procedure: RELEASE, CARPAL TUNNEL;  Surgeon: Jaime Oswald MD;  Location: [facility] OR;  Service: Orthopedics;  Laterality: Left;    CATARACT EXTRACTION Bilateral     OU     SECTION  1979    CHOLECYSTECTOMY  2014    COLONOSCOPY N/A 10/02/2020    Procedure: COLONOSCOPY;  Surgeon: Tushar Edwards MD;  Location: [facility];  Service: Endoscopy;  Laterality: N/A;    COLONOSCOPY W/ POLYPECTOMY  10/02/2020    Polyps x3, repeat 5 years; Tushar Edwards MD     CYST REMOVAL  2015    sebaceous cyst removed from face    DE QUERVAIN'S RELEASE Left 2020    Procedure: RELEASE, HAND, FOR DEQUERVAIN'S TENOSYNOVITIS;  Surgeon: Jaime Oswald MD;  Location: [facility] OR;  Service: Orthopedics;  Laterality: Left;    DE QUERVAIN'S RELEASE Right 2020    Procedure: RELEASE, HAND, FOR DEQUERVAIN'S  TENOSYNOVITIS;  Surgeon: Jaime Oswald MD;  Location: [facility];  Service: Orthopedics;  Laterality: Right;    ENDOBRONCHIAL ULTRASOUND Bilateral 04/10/2024    Procedure: ENDOBRONCHIAL ULTRASOUND (EBUS);  Surgeon: Adrian Robin MD;  Location: [facility];  Service: Pulmonary;  Laterality: Bilateral;    EYE SURGERY      gastric sleeve  02/13/2017    Dr. Watson    INJECTION OF ANESTHETIC AGENT AROUND MULTIPLE INTERCOSTAL NERVES  5/10/2024    Procedure: BLOCK, NERVE, INTERCOSTAL, 2 OR MORE;  Surgeon: Mike Nuñez MD;  Location: [facility];  Service: Cardiothoracic;;    KNEE SURGERY Right     OLECRANON BURSECTOMY Right 07/25/2018    Procedure: BURSECTOMY, OLECRANON;  Surgeon: Jayro Pedraza Sr., MD;  Location: [facility];  Service: Orthopedics;  Laterality: Right;    SURGICAL REMOVAL OF BUNION WITH OSTEOTOMY OF METATARSAL BONE Left 05/10/2019    Procedure: BUNIONECTOMY, WITH METATARSAL OSTEOTOMY;  Surgeon: Srinivasan Villanueva DPM;  Location: [facility];  Service: Podiatry;  Laterality: Left;    SURGICAL REMOVAL OF BUNION WITH OSTEOTOMY OF METATARSAL BONE Right 06/28/2019    Procedure: BUNIONECTOMY, WITH METATARSAL OSTEOTOMY;  Surgeon: Srinivasan Villanueva DPM;  Location: [facility];  Service: Podiatry;  Laterality: Right;    SURGICAL REMOVAL OF LYMPH NODE Left 5/10/2024    Procedure: EXCISION, LYMPH NODE;  Surgeon: Mike Nuñez MD;  Location: [facility];  Service: Cardiothoracic;  Laterality: Left;    TONSILLECTOMY, ADENOIDECTOMY  1980s    TRIGGER FINGER RELEASE Right 04/01/2015    Dr. Pedraza    XI ROBOTIC RATS,WITH LOBECTOMY,LUNG Left 5/10/2024    Procedure: XI ROBOTIC RATS,WITH LOBECTOMY,LUNG;  Surgeon: Mike Nuñez MD;  Location: [facility];  Service: Cardiothoracic;  Laterality: Left;  Lingulectomy       Review of patient's allergies indicates:  No Known Allergies    Family History       Problem Relation (Age of Onset)    Diabetes Maternal Aunt, Cousin    Hypertension Maternal Grandmother    Prostate cancer Brother           Tobacco Use    Smoking status: Former     Current packs/day: 0.00     Average packs/day: 0.5 packs/day for 42.0 years (21.0 ttl pk-yrs)     Types: Cigarettes     Start date: 1970     Quit date: 2012     Years since quittin.3     Passive exposure: Past    Smokeless tobacco: Never   Substance and Sexual Activity    Alcohol use: Not Currently     Alcohol/week: 1.0 standard drink of alcohol     Types: 1 Glasses of wine per week     Comment: Glass red wine once a every 2 weeks    Drug use: Never    Sexual activity: Not Currently     Partners: Female     Birth control/protection: Abstinence, None         Review of Systems   Respiratory:  Positive for cough. Negative for shortness of breath and wheezing.    Cardiovascular:  Positive for chest pain (from surgical and chest tube site) and palpitations.   All other systems reviewed and are negative.    Objective:     Vital Signs (Most Recent):  Temp: 98.9 °F (37.2 °C) (24 1201)  Pulse: (!) 144 (24 1627)  Resp: 16 (24 1516)  BP: 116/72 (24 1627)  SpO2: 99 % (24 1516) Vital Signs (24h Range):  Temp:  [97.7 °F (36.5 °C)-99.8 °F (37.7 °C)] 98.9 °F (37.2 °C)  Pulse:  [] 144  Resp:  [16-20] 16  SpO2:  [89 %-99 %] 99 %  BP: (103-198)/(59-98) 116/72     Weight: 84.1 kg (185 lb 6.5 oz)  Body mass index is 32.84 kg/m².      Intake/Output Summary (Last 24 hours) at 2024 1726  Last data filed at 2024 1838  Gross per 24 hour   Intake 320.77 ml   Output 1 ml   Net 319.77 ml        Physical Exam  Vitals reviewed.   Constitutional:       General: She is awake. She is not in acute distress.     Appearance: She is obese. She is not ill-appearing or toxic-appearing.   Cardiovascular:      Rate and Rhythm: Tachycardia present.      Pulses:           Radial pulses are 2+ on the right side and 2+ on the left side.   Pulmonary:      Effort: Pulmonary effort is normal.      Breath sounds: Decreased breath sounds present. No  "wheezing, rhonchi or rales.      Comments: On 8L NC with sat of 92-93%  Abdominal:      General: There is no distension.      Palpations: Abdomen is soft.   Musculoskeletal:      Comments: HAROON   Skin:     General: Skin is warm and dry.   Neurological:      General: No focal deficit present.      Mental Status: She is alert.   Psychiatric:         Behavior: Behavior is cooperative.          Vents:  Oxygen Concentration (%): 40 (05/12/24 2008)    Lines/Drains/Airways       Drain  Duration             Female External Urinary Catheter w/ Suction 05/12/24 2316 <1 day              Peripheral Intravenous Line  Duration                  Peripheral IV - Single Lumen 05/10/24 0930 20 G Left Hand 3 days         Peripheral IV - Single Lumen 05/10/24 1827 18 G Right Forearm 2 days                    Significant Labs:    CBC/Anemia Profile:  Recent Labs   Lab 05/12/24  0626 05/13/24  0525   WBC 10.37 9.30   HGB 11.1* 11.3*   HCT 33.3* 33.9*    237   MCV 76* 77*   RDW 14.0 13.9        Chemistries:  No results for input(s): "NA", "K", "CL", "CO2", "BUN", "CREATININE", "CALCIUM", "ALBUMIN", "PROT", "BILITOT", "ALKPHOS", "ALT", "AST", "GLUCOSE", "MG", "PHOS" in the last 48 hours.    All pertinent labs within the past 24 hours have been reviewed.    Significant Imaging:   I have reviewed all pertinent imaging results/findings within the past 24 hours.  "

## 2024-05-13 NOTE — HPI
73-year-old female with a known past medical history of hypertension, diabetes, hyperlipidemia, asthma, previous tobacco abuse (quit 20 years ago), and obesity that recently underwent biopsy of left upper lobe lung mass which was consistent with adenocarcinoma as well as a PET scan consistent with increased metabolic activity in that site as well as lymph node interaction that was admitted to the hospital 5/10 for planned lingulectomy and external lymph node dissection with Dr. Nuñez.  Patient with routine postop care with chest tube removal as expected.  Early in the morning of 5/13 patient requiring non-rebreather and was noted to have elevated heart rate.  On the afternoon of 5/13, pulmonary consulted for further evaluation given ongoing higher O2 requirements.    At the time of my exam, patient is awake and alert in no acute distress on 8L nasal cannula.  Multiple family members at bedside.    Interval History:  5/14: no acute events overnight, has been weaned to RA with sat 94-97%, HR remains elevated with some atrial flutter for which cards has been consulted  5/15: OOB to chair on RA this AM just after working with PT this AM, reports she walked the halls with no issues, plans for cardioversion today, eager to go home

## 2024-05-13 NOTE — CONSULTS
[facility] - Telemetry ([facility])  Pulmonology  Consult Note    Patient Name: Cassandra Campos  MRN: [id number]  Admission Date: 5/10/2024  Hospital Length of Stay: 3 days  Code Status: Full Code  Attending Physician: Mike Nuñez MD  Primary Care Provider: Emil Zhang MD   Principal Problem: Malignant neoplasm of lower lobe of left lung    Consults  Subjective:     HPI:  73-year-old female with a known past medical history of hypertension, diabetes, hyperlipidemia, asthma, previous tobacco abuse (quit 20 years ago), and obesity that recently underwent biopsy of left upper lobe lung mass which was consistent with adenocarcinoma as well as a PET scan consistent with increased metabolic activity in that site as well as lymph node interaction that was admitted to the hospital 5/10 for planned lingulectomy and external lymph node dissection with Dr. Nuñez.  Patient with routine postop care with chest tube removal as expected.  Early in the morning of 5/13 patient requiring non-rebreather and was noted to have elevated heart rate.  On the afternoon of 5/13, pulmonary consulted for further evaluation given ongoing higher O2 requirements.    At the time of my exam, patient is awake and alert in no acute distress on 8L nasal cannula.  Multiple family members at bedside.    Past Medical History:   Diagnosis Date    Arthritis     hands    Bilateral bunions     Borderline glaucoma     De Quervain's disease (radial styloid tenosynovitis)     Gastritis     upper GI 2/2017    Hydradenitis     Hyperlipidemia     Hypertension     Insomnia     Migraines 02/01/2000    Nasal septum perforation     Obesity     Pneumonia     Restrictive airway disease     Sleep apnea     SVT (supraventricular tachycardia) 09/2013    Trigger finger     Type 2 diabetes mellitus 2012     am 02/01/2024       Past Surgical History:   Procedure Laterality Date    AXILLARY HIDRADENITIS EXCISION Bilateral     BONE EXOSTOSIS EXCISION Right  2018    Procedure: EXCISION, EXOSTOSIS;  Surgeon: Jayro Pedraza Sr., MD;  Location: [facility];  Service: Orthopedics;  Laterality: Right;    BREAST BIOPSY Bilateral     both benign    BREAST SURGERY  1998    CARPAL TUNNEL RELEASE      bilateral    CARPAL TUNNEL RELEASE Right 2024    Procedure: RELEASE, CARPAL TUNNEL;  Surgeon: Jaime Oswald MD;  Location: [facility];  Service: Orthopedics;  Laterality: Right;    CARPAL TUNNEL RELEASE Left 2024    Procedure: RELEASE, CARPAL TUNNEL;  Surgeon: Jaime Oswald MD;  Location: [facility];  Service: Orthopedics;  Laterality: Left;    CATARACT EXTRACTION Bilateral     OU     SECTION  1979    CHOLECYSTECTOMY  2014    COLONOSCOPY N/A 10/02/2020    Procedure: COLONOSCOPY;  Surgeon: Tushar Edwards MD;  Location: [facility];  Service: Endoscopy;  Laterality: N/A;    COLONOSCOPY W/ POLYPECTOMY  10/02/2020    Polyps x3, repeat 5 years; Tushar Edwards MD     CYST REMOVAL  2015    sebaceous cyst removed from face    DE QUERVAIN'S RELEASE Left 2020    Procedure: RELEASE, HAND, FOR DEQUERVAIN'S TENOSYNOVITIS;  Surgeon: Jaime Oswald MD;  Location: [facility] OR;  Service: Orthopedics;  Laterality: Left;    DE QUERVAIN'S RELEASE Right 2020    Procedure: RELEASE, HAND, FOR DEQUERVAIN'S TENOSYNOVITIS;  Surgeon: Jaime Oswald MD;  Location: [facility];  Service: Orthopedics;  Laterality: Right;    ENDOBRONCHIAL ULTRASOUND Bilateral 04/10/2024    Procedure: ENDOBRONCHIAL ULTRASOUND (EBUS);  Surgeon: Adrian Robin MD;  Location: [facility];  Service: Pulmonary;  Laterality: Bilateral;    EYE SURGERY      gastric sleeve  2017    Dr. Watson    INJECTION OF ANESTHETIC AGENT AROUND MULTIPLE INTERCOSTAL NERVES  5/10/2024    Procedure: BLOCK, NERVE, INTERCOSTAL, 2 OR MORE;  Surgeon: Mike Nuñez MD;  Location: [facility];  Service: Cardiothoracic;;    KNEE SURGERY Right     OLECRANON BURSECTOMY Right 2018     Procedure: BURSECTOMY, OLECRANON;  Surgeon: Jayro Pedraza Sr., MD;  Location: [facility];  Service: Orthopedics;  Laterality: Right;    SURGICAL REMOVAL OF BUNION WITH OSTEOTOMY OF METATARSAL BONE Left 05/10/2019    Procedure: BUNIONECTOMY, WITH METATARSAL OSTEOTOMY;  Surgeon: Srinivasan Villanueva DPM;  Location: [facility];  Service: Podiatry;  Laterality: Left;    SURGICAL REMOVAL OF BUNION WITH OSTEOTOMY OF METATARSAL BONE Right 2019    Procedure: BUNIONECTOMY, WITH METATARSAL OSTEOTOMY;  Surgeon: Srinivasan Villanueva DPM;  Location: [facility] OR;  Service: Podiatry;  Laterality: Right;    SURGICAL REMOVAL OF LYMPH NODE Left 5/10/2024    Procedure: EXCISION, LYMPH NODE;  Surgeon: Mike Nuñez MD;  Location: [facility];  Service: Cardiothoracic;  Laterality: Left;    TONSILLECTOMY, ADENOIDECTOMY  1980s    TRIGGER FINGER RELEASE Right 2015    Dr. Milo HALL ROBOTIC RATS,WITH LOBECTOMY,LUNG Left 5/10/2024    Procedure: XI ROBOTIC RATS,WITH LOBECTOMY,LUNG;  Surgeon: Mike Nuñez MD;  Location: [facility];  Service: Cardiothoracic;  Laterality: Left;  Lingulectomy       Review of patient's allergies indicates:  No Known Allergies    Family History       Problem Relation (Age of Onset)    Diabetes Maternal Aunt, Cousin    Hypertension Maternal Grandmother    Prostate cancer Brother          Tobacco Use    Smoking status: Former     Current packs/day: 0.00     Average packs/day: 0.5 packs/day for 42.0 years (21.0 ttl pk-yrs)     Types: Cigarettes     Start date: 1970     Quit date: 2012     Years since quittin.3     Passive exposure: Past    Smokeless tobacco: Never   Substance and Sexual Activity    Alcohol use: Not Currently     Alcohol/week: 1.0 standard drink of alcohol     Types: 1 Glasses of wine per week     Comment: Glass red wine once a every 2 weeks    Drug use: Never    Sexual activity: Not Currently     Partners: Female     Birth control/protection: Abstinence, None         Review of Systems    Respiratory:  Positive for cough. Negative for shortness of breath and wheezing.    Cardiovascular:  Positive for chest pain (from surgical and chest tube site) and palpitations.   All other systems reviewed and are negative.    Objective:     Vital Signs (Most Recent):  Temp: 98.9 °F (37.2 °C) (05/13/24 1201)  Pulse: (!) 144 (05/13/24 1627)  Resp: 16 (05/13/24 1516)  BP: 116/72 (05/13/24 1627)  SpO2: 99 % (05/13/24 1516) Vital Signs (24h Range):  Temp:  [97.7 °F (36.5 °C)-99.8 °F (37.7 °C)] 98.9 °F (37.2 °C)  Pulse:  [] 144  Resp:  [16-20] 16  SpO2:  [89 %-99 %] 99 %  BP: (103-198)/(59-98) 116/72     Weight: 84.1 kg (185 lb 6.5 oz)  Body mass index is 32.84 kg/m².      Intake/Output Summary (Last 24 hours) at 5/13/2024 1726  Last data filed at 5/12/2024 1838  Gross per 24 hour   Intake 320.77 ml   Output 1 ml   Net 319.77 ml        Physical Exam  Vitals reviewed.   Constitutional:       General: She is awake. She is not in acute distress.     Appearance: She is obese. She is not ill-appearing or toxic-appearing.   Cardiovascular:      Rate and Rhythm: Tachycardia present.      Pulses:           Radial pulses are 2+ on the right side and 2+ on the left side.   Pulmonary:      Effort: Pulmonary effort is normal.      Breath sounds: Decreased breath sounds present. No wheezing, rhonchi or rales.      Comments: On 8L NC with sat of 92-93%  Abdominal:      General: There is no distension.      Palpations: Abdomen is soft.   Musculoskeletal:      Comments: PATIÑO   Skin:     General: Skin is warm and dry.   Neurological:      General: No focal deficit present.      Mental Status: She is alert.   Psychiatric:         Behavior: Behavior is cooperative.          Vents:  Oxygen Concentration (%): 40 (05/12/24 2008)    Lines/Drains/Airways       Drain  Duration             Female External Urinary Catheter w/ Suction 05/12/24 2316 <1 day              Peripheral Intravenous Line  Duration                  Peripheral IV -  "Single Lumen 05/10/24 0930 20 G Left Hand 3 days         Peripheral IV - Single Lumen 05/10/24 1827 18 G Right Forearm 2 days                    Significant Labs:    CBC/Anemia Profile:  Recent Labs   Lab 05/12/24  0626 05/13/24  0525   WBC 10.37 9.30   HGB 11.1* 11.3*   HCT 33.3* 33.9*    237   MCV 76* 77*   RDW 14.0 13.9        Chemistries:  No results for input(s): "NA", "K", "CL", "CO2", "BUN", "CREATININE", "CALCIUM", "ALBUMIN", "PROT", "BILITOT", "ALKPHOS", "ALT", "AST", "GLUCOSE", "MG", "PHOS" in the last 48 hours.    All pertinent labs within the past 24 hours have been reviewed.    Significant Imaging:   I have reviewed all pertinent imaging results/findings within the past 24 hours.    ABG  No results for input(s): "PH", "PO2", "PCO2", "HCO3", "BE" in the last 168 hours.  Assessment/Plan:     Cardiac/Vascular  Paroxysmal SVT (supraventricular tachycardia)  - HR noted to be 130-140 since around [place], this is about same time she required increased O2 to maintain sats per chart review  - additional BB given per primary team, continue to monitor and treat as needed to achieve controlled HR, goal HR <100  - controlled HR will likely help reduce O2 requirements  - does not appear volume up on exam, echo from 5/1 with EF 55% and normal diastolic function    - continue telemetry monitoring     Oncology  * Malignant neoplasm of lower lobe of left lung  - admitted 5/10 per CTS for planned surgical intervention, pt s/p lingulectomy and external lymph node dissection and left sided CT since removed  - CXR with atelectasis, no effusion or other process appreciated   - instructed on how to use IS correctly, poor effort with return demonstration, weak cough  - consulted PT/OT, pt needs to be OOB with meals, mobilization, pulmonary hygiene measures   - pt does not appear to be very motivated currently, will follow up closely tomorrow, all questions answered     Endocrine  Obesity  Body mass index is 32.84 kg/m². " Morbid obesity complicates all aspects of disease management from diagnostic modalities to treatment. Weight loss encouraged and health benefits explained to patient.   - pt would benefit greatly from weight loss and lifestyle modifications     Thank you for your consult. I will follow-up with patient. Please contact us if you have any additional questions.     Bud Jones NP  Pulmonology  [facility] - Telemetry ([facility])

## 2024-05-13 NOTE — CHAPLAIN
Initial visit with patient.  Visited with patient to assess for spiritual and emotional needs.  Patient was doing well at the time of my visit but did ask for prayer.  I took time to do this before leaving and spiritual care remains available as needed.    Chaplain Brennan Freedman M.Div., [facility]

## 2024-05-13 NOTE — PLAN OF CARE
A207/A207 RUBEN Campos is a 73 y.o.female admitted on 5/10/2024 for Malignant neoplasm of lower lobe of left lung   Code Status: Full Code MRN: [id number]   Review of patient's allergies indicates:  No Known Allergies  Past Medical History:   Diagnosis Date    Arthritis     hands    Bilateral bunions     Borderline glaucoma     De Quervain's disease (radial styloid tenosynovitis)     Gastritis     upper GI 2/2017    Hydradenitis     Hyperlipidemia     Hypertension     Insomnia     Migraines 02/01/2000    Nasal septum perforation     Obesity     Pneumonia     Restrictive airway disease     Sleep apnea     SVT (supraventricular tachycardia) 09/2013    Trigger finger     Type 2 diabetes mellitus 2012     am 02/01/2024      PRN meds    acetaminophen, 650 mg, Q6H PRN  bisacodyL, 10 mg, Daily PRN  dextrose 10%, 12.5 g, PRN  dextrose 10%, 25 g, PRN  glucagon (human recombinant), 1 mg, PRN  glucose, 16 g, PRN  glucose, 24 g, PRN  metoclopramide, 5 mg, Q6H PRN  morphine, 4 mg, Q4H PRN  ondansetron, 8 mg, Q8H PRN  oxyCODONE, 5 mg, Q4H PRN      Chart check completed. Will continue plan of care.      Orientation: oriented x 4  [place] Coma Scale Score: 15     Lead Monitored: Lead II Rhythm: sinus tachycardia    Cardiac/Telemetry Box Number: [id number]  VTE Required Core Measure: Pharmacological prophylaxis initiated/maintained Last Bowel Movement: 05/13/24  Diet Adult Regular (IDDSI Level 7)  Voiding Characteristics: voids spontaneously without difficulty  Naresh Score: 18  Fall Risk Score: 10  Accucheck []   Freq?      Lines/Drains/Airways       Drain  Duration             Female External Urinary Catheter w/ Suction 05/12/24 2316 <1 day              Peripheral Intravenous Line  Duration                  Peripheral IV - Single Lumen 05/10/24 0930 20 G Left Hand 3 days         Peripheral IV - Single Lumen 05/10/24 1827 18 G Right Forearm 2 days                       Problem: Adult Inpatient Plan of Care  Goal: Plan of  Care Review  Outcome: Progressing  Goal: Patient-Specific Goal (Individualized)  Outcome: Progressing  Goal: Absence of Hospital-Acquired Illness or Injury  Outcome: Progressing  Goal: Optimal Comfort and Wellbeing  Outcome: Progressing  Goal: Readiness for Transition of Care  Outcome: Progressing     Problem: Diabetes Comorbidity  Goal: Blood Glucose Level Within Targeted Range  Outcome: Progressing     Problem: Infection  Goal: Absence of Infection Signs and Symptoms  Outcome: Progressing     Problem: Wound  Goal: Optimal Coping  Outcome: Progressing  Goal: Optimal Functional Ability  Outcome: Progressing  Goal: Absence of Infection Signs and Symptoms  Outcome: Progressing  Goal: Improved Oral Intake  Outcome: Progressing  Goal: Optimal Pain Control and Function  Outcome: Progressing  Goal: Skin Health and Integrity  Outcome: Progressing  Goal: Optimal Wound Healing  Outcome: Progressing     Problem: Fall Injury Risk  Goal: Absence of Fall and Fall-Related Injury  Outcome: Progressing     Problem: Skin Injury Risk Increased  Goal: Skin Health and Integrity  Outcome: Progressing

## 2024-05-14 PROBLEM — I48.3 TYPICAL ATRIAL FLUTTER: Status: ACTIVE | Noted: 2024-05-14

## 2024-05-14 PROBLEM — I48.92 ATRIAL FLUTTER: Status: ACTIVE | Noted: 2024-05-14

## 2024-05-14 PROBLEM — I48.92 ATRIAL FLUTTER: Status: RESOLVED | Noted: 2024-05-14 | Resolved: 2024-05-14

## 2024-05-14 LAB
APTT PPP: 26.5 SEC (ref 21–32)
APTT PPP: 27.3 SEC (ref 21–32)
BASOPHILS # BLD AUTO: 0.03 K/UL (ref 0–0.2)
BASOPHILS # BLD AUTO: 0.03 K/UL (ref 0–0.2)
BASOPHILS NFR BLD: 0.4 % (ref 0–1.9)
BASOPHILS NFR BLD: 0.4 % (ref 0–1.9)
DIFFERENTIAL METHOD BLD: ABNORMAL
DIFFERENTIAL METHOD BLD: ABNORMAL
EOSINOPHIL # BLD AUTO: 0.1 K/UL (ref 0–0.5)
EOSINOPHIL # BLD AUTO: 0.1 K/UL (ref 0–0.5)
EOSINOPHIL NFR BLD: 0.6 % (ref 0–8)
EOSINOPHIL NFR BLD: 1.6 % (ref 0–8)
ERYTHROCYTE [DISTWIDTH] IN BLOOD BY AUTOMATED COUNT: 13.5 % (ref 11.5–14.5)
ERYTHROCYTE [DISTWIDTH] IN BLOOD BY AUTOMATED COUNT: 13.7 % (ref 11.5–14.5)
HCT VFR BLD AUTO: 34.5 % (ref 37–48.5)
HCT VFR BLD AUTO: 35 % (ref 37–48.5)
HGB BLD-MCNC: 11.6 G/DL (ref 12–16)
HGB BLD-MCNC: 11.7 G/DL (ref 12–16)
IMM GRANULOCYTES # BLD AUTO: 0.03 K/UL (ref 0–0.04)
IMM GRANULOCYTES # BLD AUTO: 0.04 K/UL (ref 0–0.04)
IMM GRANULOCYTES NFR BLD AUTO: 0.4 % (ref 0–0.5)
IMM GRANULOCYTES NFR BLD AUTO: 0.5 % (ref 0–0.5)
INR PPP: 1.1 (ref 0.8–1.2)
LYMPHOCYTES # BLD AUTO: 1.7 K/UL (ref 1–4.8)
LYMPHOCYTES # BLD AUTO: 2 K/UL (ref 1–4.8)
LYMPHOCYTES NFR BLD: 21 % (ref 18–48)
LYMPHOCYTES NFR BLD: 24.8 % (ref 18–48)
MCH RBC QN AUTO: 25.2 PG (ref 27–31)
MCH RBC QN AUTO: 25.2 PG (ref 27–31)
MCHC RBC AUTO-ENTMCNC: 33.4 G/DL (ref 32–36)
MCHC RBC AUTO-ENTMCNC: 33.6 G/DL (ref 32–36)
MCV RBC AUTO: 75 FL (ref 82–98)
MCV RBC AUTO: 75 FL (ref 82–98)
MONOCYTES # BLD AUTO: 0.8 K/UL (ref 0.3–1)
MONOCYTES # BLD AUTO: 0.8 K/UL (ref 0.3–1)
MONOCYTES NFR BLD: 9.3 % (ref 4–15)
MONOCYTES NFR BLD: 9.5 % (ref 4–15)
NEUTROPHILS # BLD AUTO: 5.1 K/UL (ref 1.8–7.7)
NEUTROPHILS # BLD AUTO: 5.6 K/UL (ref 1.8–7.7)
NEUTROPHILS NFR BLD: 63.5 % (ref 38–73)
NEUTROPHILS NFR BLD: 68 % (ref 38–73)
NRBC BLD-RTO: 0 /100 WBC
NRBC BLD-RTO: 0 /100 WBC
OHS QRS DURATION: 82 MS
OHS QTC CALCULATION: 446 MS
PLATELET # BLD AUTO: 271 K/UL (ref 150–450)
PLATELET # BLD AUTO: 281 K/UL (ref 150–450)
PMV BLD AUTO: 8.9 FL (ref 9.2–12.9)
PMV BLD AUTO: 9.1 FL (ref 9.2–12.9)
POCT GLUCOSE: 104 MG/DL (ref 70–110)
POCT GLUCOSE: 116 MG/DL (ref 70–110)
POCT GLUCOSE: 189 MG/DL (ref 70–110)
PROTHROMBIN TIME: 12.3 SEC (ref 9–12.5)
RBC # BLD AUTO: 4.6 M/UL (ref 4–5.4)
RBC # BLD AUTO: 4.64 M/UL (ref 4–5.4)
WBC # BLD AUTO: 8.09 K/UL (ref 3.9–12.7)
WBC # BLD AUTO: 8.21 K/UL (ref 3.9–12.7)

## 2024-05-14 PROCEDURE — 85025 COMPLETE CBC W/AUTO DIFF WBC: CPT | Performed by: THORACIC SURGERY (CARDIOTHORACIC VASCULAR SURGERY)

## 2024-05-14 PROCEDURE — 97166 OT EVAL MOD COMPLEX 45 MIN: CPT

## 2024-05-14 PROCEDURE — 85025 COMPLETE CBC W/AUTO DIFF WBC: CPT | Mod: 91 | Performed by: INTERNAL MEDICINE

## 2024-05-14 PROCEDURE — 94761 N-INVAS EAR/PLS OXIMETRY MLT: CPT

## 2024-05-14 PROCEDURE — 94618 PULMONARY STRESS TESTING: CPT

## 2024-05-14 PROCEDURE — 36415 COLL VENOUS BLD VENIPUNCTURE: CPT | Mod: XB | Performed by: THORACIC SURGERY (CARDIOTHORACIC VASCULAR SURGERY)

## 2024-05-14 PROCEDURE — 27000221 HC OXYGEN, UP TO 24 HOURS

## 2024-05-14 PROCEDURE — 97530 THERAPEUTIC ACTIVITIES: CPT

## 2024-05-14 PROCEDURE — 21400001 HC TELEMETRY ROOM

## 2024-05-14 PROCEDURE — 94799 UNLISTED PULMONARY SVC/PX: CPT | Mod: XB

## 2024-05-14 PROCEDURE — 97535 SELF CARE MNGMENT TRAINING: CPT

## 2024-05-14 PROCEDURE — 85610 PROTHROMBIN TIME: CPT | Performed by: INTERNAL MEDICINE

## 2024-05-14 PROCEDURE — 85730 THROMBOPLASTIN TIME PARTIAL: CPT | Performed by: INTERNAL MEDICINE

## 2024-05-14 PROCEDURE — 99900035 HC TECH TIME PER 15 MIN (STAT)

## 2024-05-14 PROCEDURE — 85730 THROMBOPLASTIN TIME PARTIAL: CPT | Mod: 91 | Performed by: THORACIC SURGERY (CARDIOTHORACIC VASCULAR SURGERY)

## 2024-05-14 PROCEDURE — 63600175 PHARM REV CODE 636 W HCPCS: Performed by: THORACIC SURGERY (CARDIOTHORACIC VASCULAR SURGERY)

## 2024-05-14 PROCEDURE — 36415 COLL VENOUS BLD VENIPUNCTURE: CPT | Performed by: INTERNAL MEDICINE

## 2024-05-14 PROCEDURE — 94640 AIRWAY INHALATION TREATMENT: CPT

## 2024-05-14 PROCEDURE — 25000003 PHARM REV CODE 250: Performed by: THORACIC SURGERY (CARDIOTHORACIC VASCULAR SURGERY)

## 2024-05-14 PROCEDURE — 25000242 PHARM REV CODE 250 ALT 637 W/ HCPCS: Performed by: THORACIC SURGERY (CARDIOTHORACIC VASCULAR SURGERY)

## 2024-05-14 PROCEDURE — 25000003 PHARM REV CODE 250: Performed by: INTERNAL MEDICINE

## 2024-05-14 PROCEDURE — 97116 GAIT TRAINING THERAPY: CPT

## 2024-05-14 PROCEDURE — 63600175 PHARM REV CODE 636 W HCPCS: Performed by: INTERNAL MEDICINE

## 2024-05-14 RX ORDER — HEPARIN SODIUM,PORCINE/D5W 25000/250
0-40 INTRAVENOUS SOLUTION INTRAVENOUS CONTINUOUS
Status: DISCONTINUED | OUTPATIENT
Start: 2024-05-14 | End: 2024-05-15 | Stop reason: HOSPADM

## 2024-05-14 RX ORDER — IPRATROPIUM BROMIDE 0.5 MG/2.5ML
0.5 SOLUTION RESPIRATORY (INHALATION) EVERY 6 HOURS
Status: DISCONTINUED | OUTPATIENT
Start: 2024-05-14 | End: 2024-05-15

## 2024-05-14 RX ORDER — METOPROLOL TARTRATE 50 MG/1
50 TABLET ORAL 2 TIMES DAILY
Status: DISCONTINUED | OUTPATIENT
Start: 2024-05-14 | End: 2024-05-15 | Stop reason: HOSPADM

## 2024-05-14 RX ORDER — DILTIAZEM HCL/D5W 125 MG/125
10 PLASTIC BAG, INJECTION (ML) INTRAVENOUS CONTINUOUS
Status: DISCONTINUED | OUTPATIENT
Start: 2024-05-14 | End: 2024-05-15

## 2024-05-14 RX ORDER — DILTIAZEM HYDROCHLORIDE 5 MG/ML
5 INJECTION INTRAVENOUS ONCE
Status: COMPLETED | OUTPATIENT
Start: 2024-05-14 | End: 2024-05-14

## 2024-05-14 RX ADMIN — DILTIAZEM HYDROCHLORIDE 5 MG: 5 INJECTION INTRAVENOUS at 09:05

## 2024-05-14 RX ADMIN — IPRATROPIUM BROMIDE 0.5 MG: 0.5 SOLUTION RESPIRATORY (INHALATION) at 12:05

## 2024-05-14 RX ADMIN — HEPARIN SODIUM 12 UNITS/KG/HR: 10000 INJECTION, SOLUTION INTRAVENOUS at 12:05

## 2024-05-14 RX ADMIN — METOPROLOL TARTRATE 50 MG: 50 TABLET, FILM COATED ORAL at 08:05

## 2024-05-14 RX ADMIN — POLYETHYLENE GLYCOL 3350 17 G: 17 POWDER, FOR SOLUTION ORAL at 08:05

## 2024-05-14 RX ADMIN — FAMOTIDINE 20 MG: 20 TABLET ORAL at 08:05

## 2024-05-14 RX ADMIN — MUPIROCIN 1 G: 20 OINTMENT TOPICAL at 08:05

## 2024-05-14 RX ADMIN — IPRATROPIUM BROMIDE 0.5 MG: 0.5 SOLUTION RESPIRATORY (INHALATION) at 04:05

## 2024-05-14 RX ADMIN — LIDOCAINE 1 PATCH: 50 PATCH CUTANEOUS at 08:05

## 2024-05-14 RX ADMIN — IPRATROPIUM BROMIDE 0.5 MG: 0.5 SOLUTION RESPIRATORY (INHALATION) at 07:05

## 2024-05-14 RX ADMIN — DEXTROSE, SODIUM CHLORIDE, AND POTASSIUM CHLORIDE: 5; .45; .15 INJECTION INTRAVENOUS at 04:05

## 2024-05-14 RX ADMIN — AMITRIPTYLINE HYDROCHLORIDE 100 MG: 50 TABLET, FILM COATED ORAL at 08:05

## 2024-05-14 RX ADMIN — Medication 5 MG/HR: at 11:05

## 2024-05-14 RX ADMIN — ESOMEPRAZOLE MAGNESIUM 10 MG: 2.5 GRANULE, DELAYED RELEASE ORAL at 05:05

## 2024-05-14 NOTE — PLAN OF CARE
A207/A207 RUBEN Campos is a 73 y.o.female admitted on 5/10/2024 for Malignant neoplasm of lower lobe of left lung   Code Status: Full Code MRN: [id number]   Review of patient's allergies indicates:  No Known Allergies  Past Medical History:   Diagnosis Date    Arthritis     hands    Bilateral bunions     Borderline glaucoma     De Quervain's disease (radial styloid tenosynovitis)     Gastritis     upper GI 2/2017    Hydradenitis     Hyperlipidemia     Hypertension     Insomnia     Migraines 02/01/2000    Nasal septum perforation     Obesity     Pneumonia     Restrictive airway disease     Sleep apnea     SVT (supraventricular tachycardia) 09/2013    Trigger finger     Type 2 diabetes mellitus 2012     am 02/01/2024      PRN meds    acetaminophen, 650 mg, Q6H PRN  bisacodyL, 10 mg, Daily PRN  dextrose 10%, 12.5 g, PRN  dextrose 10%, 25 g, PRN  glucagon (human recombinant), 1 mg, PRN  glucose, 16 g, PRN  glucose, 24 g, PRN  heparin (PORCINE), 60 Units/kg (Adjusted), PRN  heparin (PORCINE), 30 Units/kg (Adjusted), PRN  metoclopramide, 5 mg, Q6H PRN  morphine, 4 mg, Q4H PRN  ondansetron, 8 mg, Q8H PRN  oxyCODONE, 5 mg, Q4H PRN      Chart check completed. Will continue plan of care.      Orientation: oriented x 4  Eileen Coma Scale Score: 15     Lead Monitored: Lead II Rhythm: sinus tachycardia Frequency/Ectopy: PVCs  Cardiac/Telemetry Box Number: [id number]  VTE Required Core Measure: Pharmacological prophylaxis initiated/maintained Last Bowel Movement: 05/13/24  Diet Adult Regular (IDDSI Level 7)  Diet NPO Except for: Sips with Medication  Voiding Characteristics: voids spontaneously without difficulty  Naresh Score: 20  Fall Risk Score: 10  Accucheck []   Freq?      Lines/Drains/Airways       Drain  Duration             Female External Urinary Catheter w/ Suction 05/12/24 2316 1 day              Peripheral Intravenous Line  Duration                  Peripheral IV - Single Lumen 05/10/24 0930 20 G Left Hand 4  days         Peripheral IV - Single Lumen 05/10/24 1827 18 G Right Forearm 4 days         Peripheral IV - Single Lumen 05/14/24 1227 22 G Posterior;Right Forearm <1 day         Peripheral IV - Single Lumen 05/14/24 1228 22 G Left;Posterior Forearm <1 day                       Problem: Adult Inpatient Plan of Care  Goal: Plan of Care Review  Outcome: Progressing  Goal: Patient-Specific Goal (Individualized)  Outcome: Progressing  Goal: Absence of Hospital-Acquired Illness or Injury  Outcome: Progressing  Goal: Optimal Comfort and Wellbeing  Outcome: Progressing  Goal: Readiness for Transition of Care  Outcome: Progressing     Problem: Diabetes Comorbidity  Goal: Blood Glucose Level Within Targeted Range  Outcome: Progressing     Problem: Infection  Goal: Absence of Infection Signs and Symptoms  Outcome: Progressing     Problem: Wound  Goal: Optimal Coping  Outcome: Progressing  Goal: Optimal Functional Ability  Outcome: Progressing  Goal: Absence of Infection Signs and Symptoms  Outcome: Progressing  Goal: Improved Oral Intake  Outcome: Progressing  Goal: Optimal Pain Control and Function  Outcome: Progressing  Goal: Skin Health and Integrity  Outcome: Progressing  Goal: Optimal Wound Healing  Outcome: Progressing     Problem: Fall Injury Risk  Goal: Absence of Fall and Fall-Related Injury  Outcome: Progressing     Problem: Skin Injury Risk Increased  Goal: Skin Health and Integrity  Outcome: Progressing

## 2024-05-14 NOTE — ASSESSMENT & PLAN NOTE
- admitted 5/10 per CTS for planned surgical intervention, pt s/p lingulectomy and external lymph node dissection  - continue aggressive PT/OT, ambulation, and IS use  - appears stronger today and feeling better   - 5/13 CXR reviewed

## 2024-05-14 NOTE — PLAN OF CARE
OT igor completed. Sit>stand SBA, functional mobility x40ft x2 trials with RW and SBA, step>pivot to bedside chair and BSC with RW and SBA, sit>sup with SBA. Recommending low intensity intervention with 24/7 SPV and A at d/c.

## 2024-05-14 NOTE — PROGRESS NOTES
[facility] - Telemetry ([facility])  Pulmonology  Progress Note    Patient Name: Cassandra Campos  MRN: [id number]  Admission Date: 5/10/2024  Hospital Length of Stay: 4 days  Code Status: Full Code  Attending Provider: Mike Nuñez MD  Primary Care Provider: Emil Zhang MD   Principal Problem: Malignant neoplasm of lower lobe of left lung    Subjective:     73-year-old female with a known past medical history of hypertension, diabetes, hyperlipidemia, asthma, previous tobacco abuse (quit 20 years ago), and obesity that recently underwent biopsy of left upper lobe lung mass which was consistent with adenocarcinoma as well as a PET scan consistent with increased metabolic activity in that site as well as lymph node interaction that was admitted to the hospital 5/10 for planned lingulectomy and external lymph node dissection with Dr. Nuñez.  Patient with routine postop care with chest tube removal as expected.  Early in the morning of 5/13 patient requiring non-rebreather and was noted to have elevated heart rate.  On the afternoon of 5/13, pulmonary consulted for further evaluation given ongoing higher O2 requirements.    At the time of my exam, patient is awake and alert in no acute distress on 8L nasal cannula.  Multiple family members at bedside.    Interval History:  5/14: no acute events overnight, has been weaned to RA with sat 94-97%, HR remains elevated with some atrial flutter for which cards has been consulted    ROS complete and negative unless stated in the interval HPI     Objective:     Vital Signs (Most Recent):  Temp: 99.5 °F (37.5 °C) (05/14/24 0712)  Pulse: (!) 136 (05/14/24 0940)  Resp: 20 (05/14/24 0731)  BP: 102/71 (05/14/24 0940)  SpO2: (!) 92 % (05/14/24 0731) Vital Signs (24h Range):  Temp:  [98.7 °F (37.1 °C)-99.5 °F (37.5 °C)] 99.5 °F (37.5 °C)  Pulse:  [130-144] 136  Resp:  [16-20] 20  SpO2:  [91 %-99 %] 92 %  BP: (102-138)/(71-90) 102/71     Weight: 84.1 kg (185 lb 6.5 oz)  Body  "mass index is 32.84 kg/m².      Intake/Output Summary (Last 24 hours) at 5/14/2024 1012  Last data filed at 5/13/2024 1804  Gross per 24 hour   Intake 275.67 ml   Output --   Net 275.67 ml        Physical Exam  Vitals reviewed.   Constitutional:       General: She is awake. She is not in acute distress.     Appearance: She is obese. She is not ill-appearing.   Cardiovascular:      Rate and Rhythm: Tachycardia present.   Pulmonary:      Effort: Pulmonary effort is normal.      Breath sounds: Decreased breath sounds present. No wheezing, rhonchi or rales.      Comments: On RA with sat 94-97%  Neurological:      Mental Status: She is alert.   Psychiatric:         Behavior: Behavior is cooperative.             Vents:  Oxygen Concentration (%): 32 (05/14/24 0731)    Lines/Drains/Airways       Drain  Duration             Female External Urinary Catheter w/ Suction 05/12/24 2316 1 day              Peripheral Intravenous Line  Duration                  Peripheral IV - Single Lumen 05/10/24 0930 20 G Left Hand 4 days         Peripheral IV - Single Lumen 05/10/24 1827 18 G Right Forearm 3 days                    Significant Labs:    CBC/Anemia Profile:  Recent Labs   Lab 05/13/24  0525 05/14/24  0501   WBC 9.30 8.09   HGB 11.3* 11.6*   HCT 33.9* 34.5*    271   MCV 77* 75*   RDW 13.9 13.5        Chemistries:  No results for input(s): "NA", "K", "CL", "CO2", "BUN", "CREATININE", "CALCIUM", "ALBUMIN", "PROT", "BILITOT", "ALKPHOS", "ALT", "AST", "GLUCOSE", "MG", "PHOS" in the last 48 hours.    All pertinent labs within the past 24 hours have been reviewed.    Significant Imaging:  I have reviewed all pertinent imaging results/findings within the past 24 hours.    ABG  No results for input(s): "PH", "PO2", "PCO2", "HCO3", "BE" in the last 168 hours.  Assessment/Plan:     Cardiac/Vascular  Paroxysmal SVT (supraventricular tachycardia)  - HR remains 130-140 with some noted atrial flutter for which cards has been consulted, " given IV cardizem this AM, possible cardioversion if needed   - echo from 5/1 with EF 55% and normal diastolic function    - continue telemetry monitoring     Oncology  * Malignant neoplasm of lower lobe of left lung  - admitted 5/10 per CTS for planned surgical intervention, pt s/p lingulectomy and external lymph node dissection  - continue aggressive PT/OT, ambulation, and IS use  - appears stronger today and feeling better   - 5/13 CXR reviewed     Endocrine  Obesity  Body mass index is 32.84 kg/m². Morbid obesity complicates all aspects of disease management from diagnostic modalities to treatment. Weight loss encouraged and health benefits explained to patient.   - pt would benefit greatly from weight loss and lifestyle modifications       Pulmonary team will remain available, but not plan to see daily. Please call if we can be of assistance sooner or if questions should arise.        Bud Jones NP  Pulmonology  [facility] - Telemetry ([facility])

## 2024-05-14 NOTE — RESPIRATORY THERAPY
Home Oxygen Evaluation - [facility] - Cardiopulmonary Department      Date Performed: 5/14/2024      1) Patient's Home O2 Sat on room air, while at rest: Room Air SpO2 At Rest: 96 %        If O2 sats on room air at rest are 88% or below, patient qualifies.  Document O2 liter flow needed in Step 2.  If O2 sats are 89% or above, complete Step 3.        2)  If patient is not ambulated and O2 sats are <88%, what is the O2 liter flow required to meet ordered saturation?      If O2 sats on room air while exercising remain 89% or above patient does not qualify, no further testing needed Document N/A in step 3. If O2 sats on room air while exercising are 88% or below, continue to Step 4.    3) Patient's O2 Sat on room air while exercising: Room Air SpO2 During Ambulation: 93 %        4) Patient's O2 Sat while exercising on O2:   at           (Must show improvement from #4 for patients to qualify)

## 2024-05-14 NOTE — PT/OT/SLP PROGRESS
"Physical Therapy  Treatment    Cassandra Campos   MRN: [id number]   Admitting Diagnosis: Malignant neoplasm of lower lobe of left lung    PT Received On: 05/13/24  PT Start Time: 1800     PT Stop Time: 1824    PT Total Time (min): 24 min       Billable Minutes:  Gait Training 24    Treatment Type: Treatment  PT/PTA: PTA     Number of PTA visits since last PT visit: 1       General Precautions: Standard, fall  Orthopedic Precautions: N/A  Braces: N/A  Respiratory Status: Nasal cannula, flow 10 L/min    Spiritual, Cultural Beliefs, Confucianism Practices, Values that Affect Care: yes    Subjective:  Communicated with epic and nurse  prior to session.  Pt agreed to tx. Fly in the room. "I want to go home, They said I have to get down to 2 LPM. " Family asked "Can she do that by tomorrow?"    Pain/Comfort  Pain Rating 1: 0/10    Objective:   Patient found with: chair check, oxygen, peripheral IV, telemetry, pulse ox (continuous)    Functional Mobility:  Bed Mobility:     Sit to supine:SBA    Transfers:    Sit to stand: SBA   Bed to chair: SBA    Gait:     100 feet x 2 with a RW and oxygen donned at 10 LPM NC> Pt demonstrated no lob and minimal sob. Oxygen sats at 100 feet and post session >93%.       Treatment and Education:       AM-PAC 6 CLICK MOBILITY  How much help from another person does this patient currently need?   1 = Unable, Total/Dependent Assistance  2 = A lot, Maximum/Moderate Assistance  3 = A little, Minimum/Contact Guard/Supervision  4 = None, Modified [place]/Independent    Turning over in bed (including adjusting bedclothes, sheets and blankets)?: 4  Sitting down on and standing up from a chair with arms (e.g., wheelchair, bedside commode, etc.): 4  Moving from lying on back to sitting on the side of the bed?: 4  Moving to and from a bed to a chair (including a wheelchair)?: 4  Need to walk in hospital room?: 4  Climbing 3-5 steps with a railing?: 3  Basic Mobility Total Score: 23    AM-PAC Raw Score " CMS G-Code Modifier Level of Impairment Assistance   6 % Total / Unable   7 - 9 CM 80 - 100% Maximal Assist   10 - 14 CL 60 - 80% Moderate Assist   15 - 19 CK 40 - 60% Moderate Assist   20 - 22 CJ 20 - 40% Minimal Assist   23 CI 1-20% SBA / CGA   24 CH 0% Independent/ Mod I     Patient left  in the bed, fly in the room   with all lines intact and call button in reach.    Assessment:  Cassandra Campos is a 73 y.o. female with a medical diagnosis of Malignant neoplasm of lower lobe of left lung and presents with improving functional mobility but still requiring significant oxygen. Plan to wean down for discharge home.    Rehab identified problem list/impairments: weakness, impaired endurance, gait instability, impaired functional mobility    Rehab potential is good.    Activity tolerance: Good    Discharge recommendations: Moderate Intensity Therapy      Barriers to discharge:      Equipment recommendations: none     GOALS:   Multidisciplinary Problems       Physical Therapy Goals          Problem: Physical Therapy    Goal Priority Disciplines Outcome Goal Variances Interventions   Physical Therapy Goal     PT, PT/OT Progressing     Description: LTG'S TO BE MET IN 14 DAYS (5/25/24)  PT WILL REQUIRE Mod I FOR BED MOBILITY  PT WILL REQUIRE Mod I FOR BED<>CHAIR TF'S  PT WILL  FEET WITH RW AND SBA  PT WILL INC AMPAC SCORE BY 2 POINTS TO PROGRESS GROSS FUNC MOBILITY                         PLAN:    Patient to be seen 3 x/week to address the above listed problems via gait training, therapeutic activities, therapeutic exercises  Plan of Care expires: 05/25/24  Plan of Care reviewed with: patient, other (see comments)         05/13/2024

## 2024-05-14 NOTE — PLAN OF CARE
05/14/24 1111   Post-Acute Status   Post-Acute Authorization Home Health   Home Health Status Set-up Complete/Auth obtained   Hospital Resources/Appts/Education Provided Post-Acute resouces added to AVS   Discharge Plan   Discharge Plan A Home Health     [facility] accepted patient.

## 2024-05-14 NOTE — ASSESSMENT & PLAN NOTE
- HR remains 130-140 with some noted atrial flutter for which cards has been consulted, given IV cardizem this AM, possible cardioversion if needed   - echo from 5/1 with EF 55% and normal diastolic function    - continue telemetry monitoring

## 2024-05-14 NOTE — H&P (VIEW-ONLY)
[facility] - Telemetry ([facility])  Cardiology  Consult Note    Patient Name: Cassandra Campos  MRN: [id number]  Admission Date: 5/10/2024  Hospital Length of Stay: 4 days  Code Status: Full Code   Attending Provider: Mike Nuñez MD   Consulting Provider: Twan Pelaez MD  Primary Care Physician: Emil Zhang MD  Principal Problem:Malignant neoplasm of lower lobe of left lung    Patient information was obtained from patient and ER records.     Inpatient consult to Cardiology  Consult performed by: Twan Pelaez MD  Consult ordered by: Mike Nuñez MD        Subjective:         HPI:   72 yo female, cardiology consult for AFL with rvr  PMH PSVT, HTN, HLP, NIDDM 10 yrs, obesity, ex smoker, DANIEL, not on CPAP. No h/o MI CVA  Had bariatric surgery done in . Uncomplicated and last 30 pounds.    05/10/20160 s/p biopsy for mediastinal lymph node with an EBUS which came back as negative for malignancy.   Developed AFL 2 mavis ago and on BB Rx.  EKG showed AFL with 2:1 conduction  05/01/24 echo showed EF nml, LA size nml  Cxr yesterday Moderate left lower lung and right mid lung airspace disease and/or atelectasis.   Now AFL HR at 140 bpm.  BP at 120 mmHg      Past Medical History:   Diagnosis Date    Arthritis     hands    Bilateral bunions     Borderline glaucoma     De Quervain's disease (radial styloid tenosynovitis)     Gastritis     upper GI 2/2017    Hydradenitis     Hyperlipidemia     Hypertension     Insomnia     Migraines 02/01/2000    Nasal septum perforation     Obesity     Pneumonia     Restrictive airway disease     Sleep apnea     SVT (supraventricular tachycardia) 09/2013    Trigger finger     Type 2 diabetes mellitus 2012     am 02/01/2024       Past Surgical History:   Procedure Laterality Date    AXILLARY HIDRADENITIS EXCISION Bilateral     BONE EXOSTOSIS EXCISION Right 07/25/2018    Procedure: EXCISION, EXOSTOSIS;  Surgeon: Jayro Pedraza Sr., MD;  Location: [facility];  Service: Orthopedics;   Laterality: Right;    BREAST BIOPSY Bilateral     both benign    BREAST SURGERY  1998    CARPAL TUNNEL RELEASE      bilateral    CARPAL TUNNEL RELEASE Right 2024    Procedure: RELEASE, CARPAL TUNNEL;  Surgeon: Jaime Oswald MD;  Location: [facility] OR;  Service: Orthopedics;  Laterality: Right;    CARPAL TUNNEL RELEASE Left 2024    Procedure: RELEASE, CARPAL TUNNEL;  Surgeon: Jaime Oswald MD;  Location: [facility] OR;  Service: Orthopedics;  Laterality: Left;    CATARACT EXTRACTION Bilateral     OU     SECTION  1979    CHOLECYSTECTOMY  2014    COLONOSCOPY N/A 10/02/2020    Procedure: COLONOSCOPY;  Surgeon: Tushar Edwards MD;  Location: [facility];  Service: Endoscopy;  Laterality: N/A;    COLONOSCOPY W/ POLYPECTOMY  10/02/2020    Polyps x3, repeat 5 years; Tushar Edwards MD     CYST REMOVAL  2015    sebaceous cyst removed from face    DE QUERVAIN'S RELEASE Left 2020    Procedure: RELEASE, HAND, FOR DEQUERVAIN'S TENOSYNOVITIS;  Surgeon: Jaime Oswald MD;  Location: [facility] OR;  Service: Orthopedics;  Laterality: Left;    DE QUERVAIN'S RELEASE Right 2020    Procedure: RELEASE, HAND, FOR DEQUERVAIN'S TENOSYNOVITIS;  Surgeon: Jaime Oswald MD;  Location: [facility] OR;  Service: Orthopedics;  Laterality: Right;    ENDOBRONCHIAL ULTRASOUND Bilateral 04/10/2024    Procedure: ENDOBRONCHIAL ULTRASOUND (EBUS);  Surgeon: Adrian Robin MD;  Location: [facility];  Service: Pulmonary;  Laterality: Bilateral;    EYE SURGERY      gastric sleeve  2017    Dr. Watson    INJECTION OF ANESTHETIC AGENT AROUND MULTIPLE INTERCOSTAL NERVES  5/10/2024    Procedure: BLOCK, NERVE, INTERCOSTAL, 2 OR MORE;  Surgeon: Mike Nuñez MD;  Location: [facility] OR;  Service: Cardiothoracic;;    KNEE SURGERY Right     OLECRANON BURSECTOMY Right 2018    Procedure: BURSECTOMY, OLECRANON;  Surgeon: Jayro Pedraza Sr., MD;  Location: [facility] OR;  Service: Orthopedics;  Laterality:  Right;    SURGICAL REMOVAL OF BUNION WITH OSTEOTOMY OF METATARSAL BONE Left 05/10/2019    Procedure: BUNIONECTOMY, WITH METATARSAL OSTEOTOMY;  Surgeon: Srinivasan Villanueva DPM;  Location: [facility] OR;  Service: Podiatry;  Laterality: Left;    SURGICAL REMOVAL OF BUNION WITH OSTEOTOMY OF METATARSAL BONE Right 06/28/2019    Procedure: BUNIONECTOMY, WITH METATARSAL OSTEOTOMY;  Surgeon: Srinivasan Villanueva DPM;  Location: [facility] OR;  Service: Podiatry;  Laterality: Right;    SURGICAL REMOVAL OF LYMPH NODE Left 5/10/2024    Procedure: EXCISION, LYMPH NODE;  Surgeon: Mike Nuñez MD;  Location: [facility] OR;  Service: Cardiothoracic;  Laterality: Left;    TONSILLECTOMY, ADENOIDECTOMY  1980s    TRIGGER FINGER RELEASE Right 04/01/2015    Dr. Milo HALL ROBOTIC RATS,WITH LOBECTOMY,LUNG Left 5/10/2024    Procedure: XI ROBOTIC RATS,WITH LOBECTOMY,LUNG;  Surgeon: Mike Nuñez MD;  Location: [facility] OR;  Service: Cardiothoracic;  Laterality: Left;  Lingulectomy       Review of patient's allergies indicates:  No Known Allergies    Current Facility-Administered Medications on File Prior to Encounter   Medication    chlorhexidine 0.12 % solution 10 mL    lactated ringers infusion    nozaseptin (NOZIN) nasal      Current Outpatient Medications on File Prior to Encounter   Medication Sig    eszopiclone (LUNESTA) 3 mg Tab Take 1 tablet (3 mg total) by mouth every evening.    LORazepam (ATIVAN) 1 MG tablet Take 1 tablet (1 mg total) by mouth 2 (two) times daily.    losartan (COZAAR) 25 MG tablet TAKE 1 TABLET BY MOUTH ONCE  DAILY (Patient taking differently: Take 25 mg by mouth every evening.)    metoprolol succinate (TOPROL-XL) 50 MG 24 hr tablet TAKE 1 TABLET BY MOUTH ONCE  DAILY (Patient taking differently: Take 50 mg by mouth every evening.)    omeprazole (PRILOSEC) 20 MG capsule TAKE 1 CAPSULE BY MOUTH ONCE  DAILY (Patient taking differently: Take 20 mg by mouth every evening.)    albuterol (PROVENTIL/VENTOLIN HFA) 90  mcg/actuation inhaler INHALE 2 PUFFS INTO THE LUNGS EVERY 4 HOURS AS NEEDED FOR WHEEZING OR SHORTNESS OF BREATH.    amitriptyline (ELAVIL) 100 MG tablet Take 1 tablet (100 mg total) by mouth every evening.    blood glucose control, high (TRUE METRIX LEVEL 3) Soln 1 each by Other route once daily.    blood sugar diagnostic (ACCU-CHEK GUIDE TEST STRIPS) Strp USE TO TEST TWICE A DAY    blood sugar diagnostic Strp To check BG 2   times daily, to use with insurance preferred meter    blood-glucose meter (ACCU-CHEK GUIDE ME GLUCOSE MTR) Misc use to check blood glucose 2 times a day    blood-glucose meter Misc 1 each by Misc.(Non-Drug; Combo Route) route 2 (two) times a day.    blood-glucose sensor (DEXCOM G7 SENSOR) Wilma 1 each by Misc.(Non-Drug; Combo Route) route continuous.    cholecalciferol, vitamin D3, (VITAMIN D3) 25 mcg (1,000 unit) capsule Take 1,000 Units by mouth once daily.    lancets Misc Use to check blood glucose 2 times daily    TRUEPLUS LANCETS 33 gauge Misc TEST BLOOD SUGAR ONE TIME DAILY     Family History       Problem Relation (Age of Onset)    Diabetes Maternal Aunt, Cousin    Hypertension Maternal Grandmother    Prostate cancer Brother          Tobacco Use    Smoking status: Former     Current packs/day: 0.00     Average packs/day: 0.5 packs/day for 42.0 years (21.0 ttl pk-yrs)     Types: Cigarettes     Start date: 1970     Quit date: 2012     Years since quittin.3     Passive exposure: Past    Smokeless tobacco: Never   Substance and Sexual Activity    Alcohol use: Not Currently     Alcohol/week: 1.0 standard drink of alcohol     Types: 1 Glasses of wine per week     Comment: Glass red wine once a every 2 weeks    Drug use: Never    Sexual activity: Not Currently     Partners: Female     Birth control/protection: Abstinence, None     Review of Systems   Constitutional: Negative for decreased appetite, diaphoresis, fever, malaise/fatigue and night sweats.   HENT:  Negative for  nosebleeds.    Eyes:  Negative for blurred vision and double vision.   Cardiovascular:  Positive for palpitations. Negative for chest pain, claudication, dyspnea on exertion, irregular heartbeat, leg swelling, near-syncope, orthopnea, paroxysmal nocturnal dyspnea and syncope.   Respiratory:  Positive for cough. Negative for shortness of breath, sleep disturbances due to breathing, snoring, sputum production and wheezing.    Endocrine: Negative for cold intolerance and polyuria.   Hematologic/Lymphatic: Does not bruise/bleed easily.   Skin:  Negative for rash.   Musculoskeletal:  Negative for back pain, falls, joint pain, joint swelling and neck pain.   Gastrointestinal:  Negative for abdominal pain, heartburn, nausea and vomiting.   Genitourinary:  Negative for dysuria, frequency and hematuria.   Neurological:  Negative for difficulty with concentration, dizziness, focal weakness, headaches, light-headedness, numbness, seizures and weakness.   Psychiatric/Behavioral:  Negative for depression, memory loss and substance abuse. The patient does not have insomnia.    Allergic/Immunologic: Negative for HIV exposure and hives.     Objective:     Vital Signs (Most Recent):  Temp: 99.5 °F (37.5 °C) (05/14/24 0712)  Pulse: (!) 133 (05/14/24 1020)  Resp: 20 (05/14/24 0731)  BP: 116/79 (05/14/24 1020)  SpO2: (!) 92 % (05/14/24 0731) Vital Signs (24h Range):  Temp:  [98.7 °F (37.1 °C)-99.5 °F (37.5 °C)] 99.5 °F (37.5 °C)  Pulse:  [130-144] 133  Resp:  [16-20] 20  SpO2:  [91 %-99 %] 92 %  BP: (102-138)/(71-90) 116/79     Weight: 84.1 kg (185 lb 6.5 oz)  Body mass index is 32.84 kg/m².    SpO2: (!) 92 %         Intake/Output Summary (Last 24 hours) at 5/14/2024 1041  Last data filed at 5/13/2024 1804  Gross per 24 hour   Intake 275.67 ml   Output --   Net 275.67 ml       Lines/Drains/Airways       Drain  Duration             Female External Urinary Catheter w/ Suction 05/12/24 2316 1 day              Peripheral Intravenous Line   "Duration                  Peripheral IV - Single Lumen 05/10/24 0930 20 G Left Hand 4 days         Peripheral IV - Single Lumen 05/10/24 1827 18 G Right Forearm 3 days                     Physical Exam  HENT:      Head: Normocephalic.   Eyes:      Pupils: Pupils are equal, round, and reactive to light.   Neck:      Thyroid: No thyromegaly.      Vascular: Normal carotid pulses. No carotid bruit or JVD.   Cardiovascular:      Rate and Rhythm: Tachycardia present. Rhythm irregular. No extrasystoles are present.     Chest Wall: PMI is not displaced.      Pulses: Normal pulses.           Carotid pulses are 2+ on the right side and 2+ on the left side.     Heart sounds: Normal heart sounds. No murmur heard.     No gallop. No S3 sounds.   Pulmonary:      Effort: No respiratory distress.      Breath sounds: Normal breath sounds. No stridor.   Abdominal:      General: Bowel sounds are normal.      Palpations: Abdomen is soft.      Tenderness: There is no abdominal tenderness. There is no rebound.   Musculoskeletal:         General: Normal range of motion.   Skin:     Findings: No rash.   Neurological:      Mental Status: She is alert and oriented to person, place, and time.   Psychiatric:         Behavior: Behavior normal.          Significant Labs: ABG: No results for input(s): "PH", "PCO2", "HCO3", "POCSATURATED", "BE" in the last 48 hours., Blood Culture: No results for input(s): "LABBLOO" in the last 48 hours., BMP: No results for input(s): "GLU", "NA", "K", "CL", "CO2", "BUN", "CREATININE", "CALCIUM", "MG" in the last 48 hours., CMP No results for input(s): "NA", "K", "CL", "CO2", "GLU", "BUN", "CREATININE", "CALCIUM", "PROT", "ALBUMIN", "BILITOT", "ALKPHOS", "AST", "ALT", "ANIONGAP", "ESTGFRAFRICA", "EGFRNONAA" in the last 48 hours., CBC   Recent Labs   Lab 05/13/24  0525 05/14/24  0501   WBC 9.30 8.09   HGB 11.3* 11.6*   HCT 33.9* 34.5*    271   , INR No results for input(s): "INR", "PROTIME" in the last 48 " "hours., Lipid Panel No results for input(s): "CHOL", "HDL", "LDLCALC", "TRIG", "CHOLHDL" in the last 48 hours., and Troponin No results for input(s): "TROPONINI" in the last 48 hours.    Significant Imaging: EKG:    Assessment and Plan:     Typical atrial flutter  New Dx of AFL with RVR postop  EF and LA size nml    -- add heparin gtt  ok with Dr. Nuñez  --add Cardizem 5 mg ivp now  -- keep NPO now LAKESHIA and DCCV if remains AFL            VTE Risk Mitigation (From admission, onward)           Ordered     heparin 25,000 units in dextrose 5% (100 units/ml) IV bolus from bag LOW INTENSITY nomogram - [facility]  As needed (PRN)        Question:  Heparin Infusion Adjustment (DO NOT MODIFY ANSWER)  Answer:  [email or web address]\epic\Images\Pharmacy\HeparinInfusions\heparin LOW INTENSITY nomogram for [facility] [id number]    05/14/24 1046     heparin 25,000 units in dextrose 5% (100 units/ml) IV bolus from bag LOW INTENSITY nomogram - [facility]  As needed (PRN)        Question:  Heparin Infusion Adjustment (DO NOT MODIFY ANSWER)  Answer:  [email or web address]\epic\Images\Pharmacy\HeparinInfusions\heparin LOW INTENSITY nomogram for [facility] [id number]    05/14/24 1046     heparin 25,000 units in dextrose 5% (100 units/ml) IV bolus from bag LOW INTENSITY nomogram - [facility]  Once        Question:  Heparin Infusion Adjustment (DO NOT MODIFY ANSWER)  Answer:  [email or web address]\epic\Images\Pharmacy\HeparinInfusions\heparin LOW INTENSITY nomogram for [facility] [id number]    05/14/24 1046     heparin 25,000 units in dextrose 5% 250 mL (100 units/mL) infusion LOW INTENSITY nomogram - [facility]  Continuous        Question:  Begin at (units/kg/hr)  Answer:  12    05/14/24 1046     IP VTE HIGH RISK PATIENT  Once         05/10/24 1802     Place sequential compression device  Until discontinued         05/10/24 1802                    Thank you for your consult. I will follow-up with patient. Please contact us if you have any additional questions.    Twan Pelaez MD  Cardiology   [facility] - " Telemetry ([facility])

## 2024-05-14 NOTE — PLAN OF CARE
GOOD PARTICIPATION, SBA FOR TF'S AND GAIT WITH RW.  P.T. DC REC: LOW INTENSITY THERAPY WITH 24 HOUR CARE FOR SAFETY

## 2024-05-14 NOTE — PT/OT/SLP PROGRESS
Physical Therapy Treatment    Patient Name:  Cassandra Campos   MRN:  [id number]    Recommendations:     Discharge Recommendations: Low Intensity Therapy (WITH 24 HOUR CARE FOR SAFETY)  Discharge Equipment Recommendations: none  Barriers to discharge: None    Assessment:     Cassandra Campos is a 73 y.o. female admitted with a medical diagnosis of Malignant neoplasm of lower lobe of left lung.  She presents with the following impairments/functional limitations: weakness, impaired endurance, impaired functional mobility, gait instability, impaired balance, decreased safety awareness, decreased lower extremity function, decreased coordination.    Rehab Prognosis: Good; patient would benefit from acute skilled PT services to address these deficits and reach maximum level of function.    Recent Surgery: Procedure(s) (LRB):  XI ROBOTIC RATS,WITH LOBECTOMY,LUNG (Left)  EXCISION, LYMPH NODE (Left)  BLOCK, NERVE, INTERCOSTAL, 2 OR MORE 4 Days Post-Op    Plan:     During this hospitalization, patient to be seen 3 x/week to address the identified rehab impairments via gait training, therapeutic activities, therapeutic exercises and progress toward the following goals:    Plan of Care Expires:  05/25/24    Subjective     Chief Complaint: NONE, EAGER TO WALK  Patient/Family Comments/goals:   Pain/Comfort:  Pain Rating 1: 0/10      Objective:     Communicated with NURSE GARY prior to session.  Patient found up in chair with chair check, telemetry, peripheral IV, pulse ox (continuous) upon PT entry to room.     General Precautions: Standard, fall, respiratory (ELEVATED HR WITH EXERTION)  Orthopedic Precautions: N/A  Braces: N/A  Respiratory Status: Room air     Functional Mobility:  Bed Mobility:     Rolling Left:  stand by assistance  Scooting: stand by assistance  Sit to Supine: stand by assistance  Transfers:     Sit to Stand:  stand by assistance with rolling walker  Bed to Chair: stand by assistance with  rolling walker   "using  Step Transfer  Toilet Transfer: stand by assistance with  rolling walker  using  Step Transfer, PT ABLE TO CLEAN HERSELF WITH SET UP  Gait: PT AMB 50' X 2 TRIALS WITH RW AND SBA, QUICK TO FATIGUE ON ROOM AIR, ELEVATED HR WITH MINIMAL EXERTION,  BPM, CUES FOR UPRIGHT POSTURE AND RW SAFETY, EDUCATED ON IMPORTANCE OF ACTIVITY PACING  Balance: GOOD SITTING BALANCE, FAIR DYNAMIC BALANCE DURING GAIT    AM-PAC 6 CLICK MOBILITY  Turning over in bed (including adjusting bedclothes, sheets and blankets)?: 4  Sitting down on and standing up from a chair with arms (e.g., wheelchair, bedside commode, etc.): 4  Moving from lying on back to sitting on the side of the bed?: 4  Moving to and from a bed to a chair (including a wheelchair)?: 4  Need to walk in hospital room?: 4  Climbing 3-5 steps with a railing?: 1  Basic Mobility Total Score: 21     Treatment & Education:  PT EDUCATION:  - ROLE OF P.T. AND POC IN ACUTE CARE HOSPITAL SETTING  - RW USE AND SAFETY DURING TF'S AND GAIT  - IMPORTANCE OF ACTIVITY PACING FOR ENERGY CONSERVATION  - ENCOURAGED TO INCREASE TIME OOB IN CHAIR TO TOLERANCE   - TO CONTINUE THERAPUETIC EXERCISES THROUGHOUT THE DAY TO INCREASE ACTIVITY TOLERANCE AND DECREASE RISK FOR PNEUMONIA AND BLOOD CLOTS: HIP FLEX/EXT, HIP ABD/ADD, QUAD SET, HEEL SLIDE, AP  - RISK FOR FALLS DUE TO GENERALIZED WEAKNESS, EDUCATED ON "CALL DON'T FALL", ENCOURAGED TO CALL FOR ASSISTANCE WITH ALL NEEDS SUCH AS BED<>CHAIR TRANSFERS OR TRIPS TO BATHROOM, PT AGREEABLE TO SAFETY PRECAUTIONS    Patient left HOB elevated with all lines intact, call button in reach, and NURSE notified..    GOALS:   Multidisciplinary Problems       Physical Therapy Goals          Problem: Physical Therapy    Goal Priority Disciplines Outcome Goal Variances Interventions   Physical Therapy Goal     PT, PT/OT Progressing     Description: LTG'S TO BE MET IN 14 DAYS (5/25/24)  PT WILL REQUIRE Mod I FOR BED MOBILITY  PT WILL REQUIRE Mod I FOR " BED<>CHAIR TF'S  PT WILL  FEET WITH RW AND SBA  PT WILL INC AMPAC SCORE BY 2 POINTS TO PROGRESS GROSS FUNC MOBILITY                         Time Tracking:     PT Received On: 05/14/24  PT Start Time: 0805     PT Stop Time: 0835  PT Total Time (min): 30 min     Billable Minutes: Gait Training 15 and Therapeutic Activity 15    Treatment Type: Treatment  PT/PTA: PT     Number of PTA visits since last PT visit: 0     05/14/2024

## 2024-05-14 NOTE — SUBJECTIVE & OBJECTIVE
Past Medical History:   Diagnosis Date    Arthritis     hands    Bilateral bunions     Borderline glaucoma     De Quervain's disease (radial styloid tenosynovitis)     Gastritis     upper GI 2017    Hydradenitis     Hyperlipidemia     Hypertension     Insomnia     Migraines 2000    Nasal septum perforation     Obesity     Pneumonia     Restrictive airway disease     Sleep apnea     SVT (supraventricular tachycardia) 2013    Trigger finger     Type 2 diabetes mellitus      am 2024       Past Surgical History:   Procedure Laterality Date    AXILLARY HIDRADENITIS EXCISION Bilateral     BONE EXOSTOSIS EXCISION Right 2018    Procedure: EXCISION, EXOSTOSIS;  Surgeon: Jayro Pedraza Sr., MD;  Location: [facility];  Service: Orthopedics;  Laterality: Right;    BREAST BIOPSY Bilateral     both benign    BREAST SURGERY  1998    CARPAL TUNNEL RELEASE      bilateral    CARPAL TUNNEL RELEASE Right 2024    Procedure: RELEASE, CARPAL TUNNEL;  Surgeon: Jaime Oswald MD;  Location: [facility] OR;  Service: Orthopedics;  Laterality: Right;    CARPAL TUNNEL RELEASE Left 2024    Procedure: RELEASE, CARPAL TUNNEL;  Surgeon: Jaime Oswald MD;  Location: [facility] OR;  Service: Orthopedics;  Laterality: Left;    CATARACT EXTRACTION Bilateral     OU     SECTION  1979    CHOLECYSTECTOMY  2014    COLONOSCOPY N/A 10/02/2020    Procedure: COLONOSCOPY;  Surgeon: Tushar Edwards MD;  Location: [facility];  Service: Endoscopy;  Laterality: N/A;    COLONOSCOPY W/ POLYPECTOMY  10/02/2020    Polyps x3, repeat 5 years; Tushar Edwards MD     CYST REMOVAL  2015    sebaceous cyst removed from face    DE QUERVAIN'S RELEASE Left 2020    Procedure: RELEASE, HAND, FOR DEQUERVAIN'S TENOSYNOVITIS;  Surgeon: Jaime Oswald MD;  Location: [facility] OR;  Service: Orthopedics;  Laterality: Left;    DE QUERVAIN'S RELEASE Right 2020    Procedure: RELEASE, HAND, FOR DEQUERVAIN'S  TENOSYNOVITIS;  Surgeon: Jaime Oswald MD;  Location: [facility] OR;  Service: Orthopedics;  Laterality: Right;    ENDOBRONCHIAL ULTRASOUND Bilateral 04/10/2024    Procedure: ENDOBRONCHIAL ULTRASOUND (EBUS);  Surgeon: Adrian Robin MD;  Location: [facility];  Service: Pulmonary;  Laterality: Bilateral;    EYE SURGERY      gastric sleeve  02/13/2017    Dr. Watson    INJECTION OF ANESTHETIC AGENT AROUND MULTIPLE INTERCOSTAL NERVES  5/10/2024    Procedure: BLOCK, NERVE, INTERCOSTAL, 2 OR MORE;  Surgeon: Mike Nuñez MD;  Location: [facility] OR;  Service: Cardiothoracic;;    KNEE SURGERY Right     OLECRANON BURSECTOMY Right 07/25/2018    Procedure: BURSECTOMY, OLECRANON;  Surgeon: Jayro Pedraza Sr., MD;  Location: [facility];  Service: Orthopedics;  Laterality: Right;    SURGICAL REMOVAL OF BUNION WITH OSTEOTOMY OF METATARSAL BONE Left 05/10/2019    Procedure: BUNIONECTOMY, WITH METATARSAL OSTEOTOMY;  Surgeon: Srinivasan Villanueva DPM;  Location: [facility];  Service: Podiatry;  Laterality: Left;    SURGICAL REMOVAL OF BUNION WITH OSTEOTOMY OF METATARSAL BONE Right 06/28/2019    Procedure: BUNIONECTOMY, WITH METATARSAL OSTEOTOMY;  Surgeon: Srinivasan Villanueva DPM;  Location: [facility] OR;  Service: Podiatry;  Laterality: Right;    SURGICAL REMOVAL OF LYMPH NODE Left 5/10/2024    Procedure: EXCISION, LYMPH NODE;  Surgeon: Mike Nuñez MD;  Location: [facility] OR;  Service: Cardiothoracic;  Laterality: Left;    TONSILLECTOMY, ADENOIDECTOMY  1980s    TRIGGER FINGER RELEASE Right 04/01/2015    Dr. Pedraza    XI ROBOTIC RATS,WITH LOBECTOMY,LUNG Left 5/10/2024    Procedure: XI ROBOTIC RATS,WITH LOBECTOMY,LUNG;  Surgeon: Mike Nuñez MD;  Location: [facility];  Service: Cardiothoracic;  Laterality: Left;  Lingulectomy       Review of patient's allergies indicates:  No Known Allergies    Current Facility-Administered Medications on File Prior to Encounter   Medication    chlorhexidine 0.12 % solution 10 mL    lactated ringers infusion     nozaseptin (NOZIN) nasal      Current Outpatient Medications on File Prior to Encounter   Medication Sig    eszopiclone (LUNESTA) 3 mg Tab Take 1 tablet (3 mg total) by mouth every evening.    LORazepam (ATIVAN) 1 MG tablet Take 1 tablet (1 mg total) by mouth 2 (two) times daily.    losartan (COZAAR) 25 MG tablet TAKE 1 TABLET BY MOUTH ONCE  DAILY (Patient taking differently: Take 25 mg by mouth every evening.)    metoprolol succinate (TOPROL-XL) 50 MG 24 hr tablet TAKE 1 TABLET BY MOUTH ONCE  DAILY (Patient taking differently: Take 50 mg by mouth every evening.)    omeprazole (PRILOSEC) 20 MG capsule TAKE 1 CAPSULE BY MOUTH ONCE  DAILY (Patient taking differently: Take 20 mg by mouth every evening.)    albuterol (PROVENTIL/VENTOLIN HFA) 90 mcg/actuation inhaler INHALE 2 PUFFS INTO THE LUNGS EVERY 4 HOURS AS NEEDED FOR WHEEZING OR SHORTNESS OF BREATH.    amitriptyline (ELAVIL) 100 MG tablet Take 1 tablet (100 mg total) by mouth every evening.    blood glucose control, high (TRUE METRIX LEVEL 3) Soln 1 each by Other route once daily.    blood sugar diagnostic (ACCU-CHEK GUIDE TEST STRIPS) Strp USE TO TEST TWICE A DAY    blood sugar diagnostic Strp To check BG 2   times daily, to use with insurance preferred meter    blood-glucose meter (ACCU-CHEK GUIDE ME GLUCOSE MTR) Misc use to check blood glucose 2 times a day    blood-glucose meter Misc 1 each by Misc.(Non-Drug; Combo Route) route 2 (two) times a day.    blood-glucose sensor (DEXCOM G7 SENSOR) Wilma 1 each by Misc.(Non-Drug; Combo Route) route continuous.    cholecalciferol, vitamin D3, (VITAMIN D3) 25 mcg (1,000 unit) capsule Take 1,000 Units by mouth once daily.    lancets Misc Use to check blood glucose 2 times daily    TRUEPLUS LANCETS 33 gauge Misc TEST BLOOD SUGAR ONE TIME DAILY     Family History       Problem Relation (Age of Onset)    Diabetes Maternal Aunt, Cousin    Hypertension Maternal Grandmother    Prostate cancer Brother           Tobacco Use    Smoking status: Former     Current packs/day: 0.00     Average packs/day: 0.5 packs/day for 42.0 years (21.0 ttl pk-yrs)     Types: Cigarettes     Start date: 1970     Quit date: 2012     Years since quittin.3     Passive exposure: Past    Smokeless tobacco: Never   Substance and Sexual Activity    Alcohol use: Not Currently     Alcohol/week: 1.0 standard drink of alcohol     Types: 1 Glasses of wine per week     Comment: Glass red wine once a every 2 weeks    Drug use: Never    Sexual activity: Not Currently     Partners: Female     Birth control/protection: Abstinence, None     Review of Systems   Constitutional: Negative for decreased appetite, diaphoresis, fever, malaise/fatigue and night sweats.   HENT:  Negative for nosebleeds.    Eyes:  Negative for blurred vision and double vision.   Cardiovascular:  Positive for palpitations. Negative for chest pain, claudication, dyspnea on exertion, irregular heartbeat, leg swelling, near-syncope, orthopnea, paroxysmal nocturnal dyspnea and syncope.   Respiratory:  Positive for cough. Negative for shortness of breath, sleep disturbances due to breathing, snoring, sputum production and wheezing.    Endocrine: Negative for cold intolerance and polyuria.   Hematologic/Lymphatic: Does not bruise/bleed easily.   Skin:  Negative for rash.   Musculoskeletal:  Negative for back pain, falls, joint pain, joint swelling and neck pain.   Gastrointestinal:  Negative for abdominal pain, heartburn, nausea and vomiting.   Genitourinary:  Negative for dysuria, frequency and hematuria.   Neurological:  Negative for difficulty with concentration, dizziness, focal weakness, headaches, light-headedness, numbness, seizures and weakness.   Psychiatric/Behavioral:  Negative for depression, memory loss and substance abuse. The patient does not have insomnia.    Allergic/Immunologic: Negative for HIV exposure and hives.     Objective:     Vital Signs (Most  Recent):  Temp: 99.5 °F (37.5 °C) (05/14/24 0712)  Pulse: (!) 133 (05/14/24 1020)  Resp: 20 (05/14/24 0731)  BP: 116/79 (05/14/24 1020)  SpO2: (!) 92 % (05/14/24 0731) Vital Signs (24h Range):  Temp:  [98.7 °F (37.1 °C)-99.5 °F (37.5 °C)] 99.5 °F (37.5 °C)  Pulse:  [130-144] 133  Resp:  [16-20] 20  SpO2:  [91 %-99 %] 92 %  BP: (102-138)/(71-90) 116/79     Weight: 84.1 kg (185 lb 6.5 oz)  Body mass index is 32.84 kg/m².    SpO2: (!) 92 %         Intake/Output Summary (Last 24 hours) at 5/14/2024 1041  Last data filed at 5/13/2024 1804  Gross per 24 hour   Intake 275.67 ml   Output --   Net 275.67 ml       Lines/Drains/Airways       Drain  Duration             Female External Urinary Catheter w/ Suction 05/12/24 2316 1 day              Peripheral Intravenous Line  Duration                  Peripheral IV - Single Lumen 05/10/24 0930 20 G Left Hand 4 days         Peripheral IV - Single Lumen 05/10/24 1827 18 G Right Forearm 3 days                     Physical Exam  HENT:      Head: Normocephalic.   Eyes:      Pupils: Pupils are equal, round, and reactive to light.   Neck:      Thyroid: No thyromegaly.      Vascular: Normal carotid pulses. No carotid bruit or JVD.   Cardiovascular:      Rate and Rhythm: Tachycardia present. Rhythm irregular. No extrasystoles are present.     Chest Wall: PMI is not displaced.      Pulses: Normal pulses.           Carotid pulses are 2+ on the right side and 2+ on the left side.     Heart sounds: Normal heart sounds. No murmur heard.     No gallop. No S3 sounds.   Pulmonary:      Effort: No respiratory distress.      Breath sounds: Normal breath sounds. No stridor.   Abdominal:      General: Bowel sounds are normal.      Palpations: Abdomen is soft.      Tenderness: There is no abdominal tenderness. There is no rebound.   Musculoskeletal:         General: Normal range of motion.   Skin:     Findings: No rash.   Neurological:      Mental Status: She is alert and oriented to person, place,  "and time.   Psychiatric:         Behavior: Behavior normal.          Significant Labs: ABG: No results for input(s): "PH", "PCO2", "HCO3", "POCSATURATED", "BE" in the last 48 hours., Blood Culture: No results for input(s): "LABBLOO" in the last 48 hours., BMP: No results for input(s): "GLU", "NA", "K", "CL", "CO2", "BUN", "CREATININE", "CALCIUM", "MG" in the last 48 hours., CMP No results for input(s): "NA", "K", "CL", "CO2", "GLU", "BUN", "CREATININE", "CALCIUM", "PROT", "ALBUMIN", "BILITOT", "ALKPHOS", "AST", "ALT", "ANIONGAP", "ESTGFRAFRICA", "EGFRNONAA" in the last 48 hours., CBC   Recent Labs   Lab 05/13/24  0525 05/14/24  0501   WBC 9.30 8.09   HGB 11.3* 11.6*   HCT 33.9* 34.5*    271   , INR No results for input(s): "INR", "PROTIME" in the last 48 hours., Lipid Panel No results for input(s): "CHOL", "HDL", "LDLCALC", "TRIG", "CHOLHDL" in the last 48 hours., and Troponin No results for input(s): "TROPONINI" in the last 48 hours.    Significant Imaging: EKG:    "

## 2024-05-14 NOTE — SUBJECTIVE & OBJECTIVE
73-year-old female with a known past medical history of hypertension, diabetes, hyperlipidemia, asthma, previous tobacco abuse (quit 20 years ago), and obesity that recently underwent biopsy of left upper lobe lung mass which was consistent with adenocarcinoma as well as a PET scan consistent with increased metabolic activity in that site as well as lymph node interaction that was admitted to the hospital 5/10 for planned lingulectomy and external lymph node dissection with Dr. Nuñez.  Patient with routine postop care with chest tube removal as expected.  Early in the morning of 5/13 patient requiring non-rebreather and was noted to have elevated heart rate.  On the afternoon of 5/13, pulmonary consulted for further evaluation given ongoing higher O2 requirements.    At the time of my exam, patient is awake and alert in no acute distress on 8L nasal cannula.  Multiple family members at bedside.    Interval History:  5/14: no acute events overnight, has been weaned to RA with sat 94-97%, HR remains elevated with some atrial flutter for which cards has been consulted    ROS complete and negative unless stated in the interval HPI     Objective:     Vital Signs (Most Recent):  Temp: 99.5 °F (37.5 °C) (05/14/24 0712)  Pulse: (!) 136 (05/14/24 0940)  Resp: 20 (05/14/24 0731)  BP: 102/71 (05/14/24 0940)  SpO2: (!) 92 % (05/14/24 0731) Vital Signs (24h Range):  Temp:  [98.7 °F (37.1 °C)-99.5 °F (37.5 °C)] 99.5 °F (37.5 °C)  Pulse:  [130-144] 136  Resp:  [16-20] 20  SpO2:  [91 %-99 %] 92 %  BP: (102-138)/(71-90) 102/71     Weight: 84.1 kg (185 lb 6.5 oz)  Body mass index is 32.84 kg/m².      Intake/Output Summary (Last 24 hours) at 5/14/2024 1012  Last data filed at 5/13/2024 1804  Gross per 24 hour   Intake 275.67 ml   Output --   Net 275.67 ml        Physical Exam  Vitals reviewed.   Constitutional:       General: She is awake. She is not in acute distress.     Appearance: She is obese. She is not ill-appearing.  "  Cardiovascular:      Rate and Rhythm: Tachycardia present.   Pulmonary:      Effort: Pulmonary effort is normal.      Breath sounds: Decreased breath sounds present. No wheezing, rhonchi or rales.      Comments: On RA with sat 94-97%  Neurological:      Mental Status: She is alert.   Psychiatric:         Behavior: Behavior is cooperative.             Vents:  Oxygen Concentration (%): 32 (05/14/24 0731)    Lines/Drains/Airways       Drain  Duration             Female External Urinary Catheter w/ Suction 05/12/24 2316 1 day              Peripheral Intravenous Line  Duration                  Peripheral IV - Single Lumen 05/10/24 0930 20 G Left Hand 4 days         Peripheral IV - Single Lumen 05/10/24 1827 18 G Right Forearm 3 days                    Significant Labs:    CBC/Anemia Profile:  Recent Labs   Lab 05/13/24  0525 05/14/24  0501   WBC 9.30 8.09   HGB 11.3* 11.6*   HCT 33.9* 34.5*    271   MCV 77* 75*   RDW 13.9 13.5        Chemistries:  No results for input(s): "NA", "K", "CL", "CO2", "BUN", "CREATININE", "CALCIUM", "ALBUMIN", "PROT", "BILITOT", "ALKPHOS", "ALT", "AST", "GLUCOSE", "MG", "PHOS" in the last 48 hours.    All pertinent labs within the past 24 hours have been reviewed.    Significant Imaging:  I have reviewed all pertinent imaging results/findings within the past 24 hours.  "

## 2024-05-14 NOTE — PROGRESS NOTES
Subjective:      Patient ID: Cassandra Campos is a 73 y.o. female.    Chief Complaint: No chief complaint on file.    HPI:  73-year-old female who has a ex-smoker found to have a left upper lobe pulmonary nodule had a biopsy which showed adenocarcinoma.  Patient has history of hypertension type 2 diabetes mellitus and hyperlipidemia.  No history of cough no history of hemoptysis at this time.  She had a full workup including a PET scan which showed left upper lobe lesion which has high activity and a mediastinal lymph node.  She underwent biopsy for mediastinal lymph node with an EBUS which came back as negative for malignancy.  Date of Procedure: 5/10/2024      Procedure: Procedure(s) (LRB):  XI ROBOTIC RATS,WITH LOBECTOMY,LUNG (Left)  EXCISION, LYMPH NODE (Left)  BLOCK, NERVE, INTERCOSTAL, 2 OR MORE  Lingulectomy   The external lymph node dissection     Surgeons and Role:     * Mike Nuñez MD - Primary     Assisting Surgeon: None     Pre-Operative Diagnosis: Malignant neoplasm of upper lobe of left lung [C34.32]  Adenocarcinoma of the left upper lobe  Hypertension  Hyperlipidemia  Obesity  Sleep apnea  COPD  Type 2 diabetes mellitus  Depression     Post-Operative Diagnosis:   Same    Pain overnight chest tube draining serosanguineous drainage no air leak chest tube to water seal this morning incentive spirometry less than 500 mostly splinting from the pain.    05/12/2024 the patient had urinary retention overnight for which the [place] was reintroduced she is still having pain issues chest tube draining minimal fluid.  Issues with ambulation    05/13/2024 patient had a episode of desaturate last night for which she was put on non-rebreather chest x-ray showed no evidence of a pneumothorax was stable in the morning still requiring high oxygen incentive spirometry 500.    05/14/2024 the patient pain issues were better ambulating her heart drain was in the 140s atrial flutter.    Review of patient's allergies  indicates:  No Known Allergies    Past Medical History:   Diagnosis Date    Arthritis     hands    Bilateral bunions     Borderline glaucoma     De Quervain's disease (radial styloid tenosynovitis)     Gastritis     upper GI 2017    Hydradenitis     Hyperlipidemia     Hypertension     Insomnia     Migraines 2000    Nasal septum perforation     Obesity     Pneumonia     Restrictive airway disease     Sleep apnea     SVT (supraventricular tachycardia) 2013    Trigger finger     Type 2 diabetes mellitus 2012     am 2024       Family History   Problem Relation Name Age of Onset    Prostate cancer Brother      Diabetes Maternal Aunt Alondra Campos     Diabetes Cousin      Hypertension Maternal Grandmother Portia Orosco        Social History     Socioeconomic History    Marital status:     Number of children: 1   Occupational History    Occupation:  aid   Tobacco Use    Smoking status: Former     Current packs/day: 0.00     Average packs/day: 0.5 packs/day for 42.0 years (21.0 ttl pk-yrs)     Types: Cigarettes     Start date: 1970     Quit date: 2012     Years since quittin.3     Passive exposure: Past    Smokeless tobacco: Never   Substance and Sexual Activity    Alcohol use: Not Currently     Alcohol/week: 1.0 standard drink of alcohol     Types: 1 Glasses of wine per week     Comment: Glass red wine once a every 2 weeks    Drug use: Never    Sexual activity: Not Currently     Partners: Female     Birth control/protection: Abstinence, None   Social History Narrative    Single, part-time teacher. Masters degree biology.      Social Determinants of Health     Financial Resource Strain: Low Risk  (2023)    Overall Financial Resource Strain (CARDIA)     Difficulty of Paying Living Expenses: Not hard at all   Food Insecurity: Food Insecurity Present (2023)    Hunger Vital Sign     Worried About Running Out of Food in the Last Year: Never true     Ran Out of  Food in the Last Year: Sometimes true   Transportation Needs: No Transportation Needs (2023)    PRAPARE - Transportation     Lack of Transportation (Medical): No     Lack of Transportation (Non-Medical): No   Physical Activity: Insufficiently Active (2023)    Exercise Vital Sign     Days of Exercise per Week: 3 days     Minutes of Exercise per Session: 20 min   Stress: No Stress Concern Present (2023)    [employer] [place] - Occupational Stress Questionnaire     Feeling of Stress : Not at all   Housing Stability: Low Risk  (2023)    Housing Stability Vital Sign     Unable to Pay for Housing in the Last Year: No     Number of Places Lived in the Last Year: 1     Unstable Housing in the Last Year: No       Past Surgical History:   Procedure Laterality Date    AXILLARY HIDRADENITIS EXCISION Bilateral     BONE EXOSTOSIS EXCISION Right 2018    Procedure: EXCISION, EXOSTOSIS;  Surgeon: Jayro Pedraza Sr., MD;  [facility];  Service: Orthopedics;  Laterality: Right;    BREAST BIOPSY Bilateral     both benign    BREAST SURGERY  1998    CARPAL TUNNEL RELEASE      bilateral    CARPAL TUNNEL RELEASE Right 2024    Procedure: RELEASE, CARPAL TUNNEL;  Surgeon: Jaime Oswald MD;  Location: [facility] OR;  Service: Orthopedics;  Laterality: Right;    CARPAL TUNNEL RELEASE Left 2024    Procedure: RELEASE, CARPAL TUNNEL;  Surgeon: Jaime Oswald MD;  Location: [facility] OR;  Service: Orthopedics;  Laterality: Left;    CATARACT EXTRACTION Bilateral     OU     SECTION  1979    CHOLECYSTECTOMY  2014    COLONOSCOPY N/A 10/02/2020    Procedure: COLONOSCOPY;  Surgeon: Tushar Edwards MD;  Location: [facility];  Service: Endoscopy;  Laterality: N/A;    COLONOSCOPY W/ POLYPECTOMY  10/02/2020    Polyps x3, repeat 5 years; Tushar Edwards MD     CYST REMOVAL  2015    sebaceous cyst removed from face    DE QUERVAIN'S RELEASE Left 2020     Procedure: RELEASE, HAND, FOR DEQUERVAIN'S TENOSYNOVITIS;  Surgeon: Jaime Oswald MD;  Location: [facility] OR;  Service: Orthopedics;  Laterality: Left;    DE QUERVAIN'S RELEASE Right 11/20/2020    Procedure: RELEASE, HAND, FOR DEQUERVAIN'S TENOSYNOVITIS;  Surgeon: Jaime Oswald MD;  Location: [facility] OR;  Service: Orthopedics;  Laterality: Right;    ENDOBRONCHIAL ULTRASOUND Bilateral 04/10/2024    Procedure: ENDOBRONCHIAL ULTRASOUND (EBUS);  Surgeon: Adrian Robin MD;  Location: [facility];  Service: Pulmonary;  Laterality: Bilateral;    EYE SURGERY      gastric sleeve  02/13/2017    Dr. Watson    INJECTION OF ANESTHETIC AGENT AROUND MULTIPLE INTERCOSTAL NERVES  5/10/2024    Procedure: BLOCK, NERVE, INTERCOSTAL, 2 OR MORE;  Surgeon: Mike Nuñez MD;  Location: [facility];  Service: Cardiothoracic;;    KNEE SURGERY Right     OLECRANON BURSECTOMY Right 07/25/2018    Procedure: BURSECTOMY, OLECRANON;  Surgeon: Jayro Pedraza Sr., MD;  Location: [facility];  Service: Orthopedics;  Laterality: Right;    SURGICAL REMOVAL OF BUNION WITH OSTEOTOMY OF METATARSAL BONE Left 05/10/2019    Procedure: BUNIONECTOMY, WITH METATARSAL OSTEOTOMY;  Surgeon: Srinivasan Villanueva DPM;  Location: [facility];  Service: Podiatry;  Laterality: Left;    SURGICAL REMOVAL OF BUNION WITH OSTEOTOMY OF METATARSAL BONE Right 06/28/2019    Procedure: BUNIONECTOMY, WITH METATARSAL OSTEOTOMY;  Surgeon: Srinivasan Villanueva DPM;  Location: [facility] OR;  Service: Podiatry;  Laterality: Right;    SURGICAL REMOVAL OF LYMPH NODE Left 5/10/2024    Procedure: EXCISION, LYMPH NODE;  Surgeon: Mike Nuñez MD;  Location: [facility];  Service: Cardiothoracic;  Laterality: Left;    TONSILLECTOMY, ADENOIDECTOMY  1980s    TRIGGER FINGER RELEASE Right 04/01/2015    Dr. Pedraza    XI ROBOTIC RATS,WITH LOBECTOMY,LUNG Left 5/10/2024    Procedure: XI ROBOTIC RATS,WITH LOBECTOMY,LUNG;  Surgeon: Mike Nuñez MD;  Location: [facility];  Service: Cardiothoracic;   "Laterality: Left;  Lingulectomy       Review of Systems   Constitutional:  Negative for activity change and appetite change.   HENT:  Negative for dental problem, nosebleeds and sore throat.    Eyes:  Negative for discharge and visual disturbance.   Respiratory:  Positive for chest tightness and shortness of breath. Negative for cough and stridor.    Cardiovascular:  Negative for leg swelling.   Gastrointestinal:  Negative for abdominal distention and abdominal pain.   Genitourinary:  Negative for difficulty urinating and dysuria.   Musculoskeletal:  Negative for arthralgias, back pain and joint swelling.   Allergic/Immunologic: Negative for environmental allergies.   Neurological:  Negative for dizziness, syncope and headaches.   Hematological:  Does not bruise/bleed easily.   Psychiatric/Behavioral:  Negative for behavioral problems.           Objective:   /73 (Patient Position: Lying)   Pulse (!) 144   Temp 99.5 °F (37.5 °C) (Oral)   Resp 20   Ht 5' 3" (1.6 m)   Wt 84.1 kg (185 lb 6.5 oz)   SpO2 (!) 92%   Breastfeeding No   BMI 32.84 kg/m²     X-Ray Chest 1 View  Narrative: EXAMINATION:  XR CHEST 1 VIEW    CLINICAL HISTORY:  SOB, congestion, low o2 sats;    TECHNIQUE:  Single frontal view of the chest was performed.    COMPARISON:  05/11/2024    FINDINGS:  Left-sided chest tube is no longer visualized.  No definitive or detectable pneumothorax.  Moderate left lower lung and right mid lung airspace disease and/or atelectasis.  Normal heart size.  Impression: As above.    Electronically signed by: Tres Nicole  Date:    05/13/2024  Time:    00:42         Physical Exam  Vitals reviewed.   Constitutional:       Appearance: Normal appearance.   HENT:      Head: Normocephalic and atraumatic.      Mouth/Throat:      Mouth: Mucous membranes are moist.   Eyes:      Extraocular Movements: Extraocular movements intact.   Cardiovascular:      Rate and Rhythm: Normal rate and regular rhythm.      Pulses: " Normal pulses.      Heart sounds: Normal heart sounds.      Comments: Chest tube in place draining serosanguineous liquid no air leak  Pulmonary:      Effort: Pulmonary effort is normal.      Breath sounds: Rhonchi and rales present.   Abdominal:      Palpations: Abdomen is soft.   Musculoskeletal:         General: Normal range of motion.      Cervical back: Normal range of motion and neck supple.   Skin:     General: Skin is warm.      Capillary Refill: Capillary refill takes less than 2 seconds.   Neurological:      General: No focal deficit present.      Mental Status: She is alert and oriented to person, place, and time.   Psychiatric:         Mood and Affect: Mood normal.     Chest x-ray shows bilateral atelectasis postsurgical changes.    Assessment:     1. Solitary pulmonary nodule    2. Chronic bronchitis, unspecified chronic bronchitis type    3. Malignant neoplasm of lower lobe of left lung    4. Bilateral carpal tunnel syndrome    5. Hilar adenopathy    6. Tachycardia    Obesity  Hypertension  Hyperlipidemia  Depression  Sleep apnea      Plan   Postop day 4status post rats left-sided lingulectomy and mediastinal lymph node dissection for a adenocarcinoma.  Neuro Pain control as needed on oxycodone p.r.n.  Cardio vascular staple beta-blocker  The patient had atrial flutter flutter cardiology consulted for cardioversion.  Respiratory aggressive pulmonary toilet nebulizers out of bed ambulate incentive spirometry Pain con  PTOT out of bed ambulate.  GI regular diet  DVT prophylaxis on Lovenox  GI prophylaxis on esomeprazole  Restarted her home meds  May need home oxygen and home health with  and  working  Planning discharge tomorrow              Mike Nuñez MD  [facility]  [place]

## 2024-05-14 NOTE — PT/OT/SLP EVAL
"Occupational Therapy Evaluation and Treatment    Name: Cassandra Campos  MRN: [id number]  Admitting Diagnosis: Malignant neoplasm of lower lobe of left lung  Recent Surgery: Procedure(s) (LRB):  XI ROBOTIC RATS,WITH LOBECTOMY,LUNG (Left)  EXCISION, LYMPH NODE (Left)  BLOCK, NERVE, INTERCOSTAL, 2 OR MORE 4 Days Post-Op    Recommendations:     Discharge Recommendations: Low Intensity Therapy (24/7 SPV and A)  Level of Assistance Recommended: 24 hours light assistance  Discharge Equipment Recommendations: none  Barriers to discharge: None    Assessment:     Cassandra Campos is a 73 y.o. female with a medical diagnosis of Malignant neoplasm of lower lobe of left lung. She presents with performance deficits affecting function including weakness, impaired endurance, impaired self care skills, impaired functional mobility, impaired balance, impaired cardiopulmonary response to activity.     Rehab Prognosis: Fair; patient would benefit from acute OT services to address these deficits and reach maximum level of function.    Plan:     Patient to be seen 2 x/week to address the above listed problems via self-care/home management, therapeutic activities, therapeutic exercises  Plan of Care Expires: 05/28/24  Plan of Care Reviewed with: patient    Subjective     Chief Complaint: Reported "I am so ready to go home."  Patient Comments/Goals: return home  Pain/Comfort:  Pain Rating 1: 0/10    Social History:  Living Environment: Patient lives alone in a single story home with  no steps/stairs to enter. Plans to discharge to family home in [place], [place]. No steps/stairs present at this location.  Prior Level of Function: Prior to admission, patient was independent with ADLs and community distance ambulation.  Roles and Routines: Patient was driving prior to admission.  Equipment Used at Home: none  DME owned (not currently used): none  Assistance Upon Discharge: family    Objective:     Communicated with nurse, Frances, prior to " session. Patient found up in chair with chair check, peripheral IV, telemetry, pulse ox (continuous) upon OT entry to room.    General Precautions: Standard, fall, respiratory (monitor O2 sats and HR with activity)   Orthopedic Precautions: N/A   Braces: N/A    Respiratory Status: Room air- RT present at therapist arrival. Reports poor pulse ox reading with activity. Stated patient was stable on RA with exertion with her and did not qualify for home O2. Unable to obtain pulse ox reading during OT eval and tx. Noted to be tachy to 140s throughout    Occupational Performance    Gait belt applied - Yes    Bed Mobility:   Sit to Supine with stand by assistance on left side of bed    Functional Mobility/Transfers:  Sit <> Stand Transfer with stand by assistance with rolling walker  Bed <> Chair Transfer using Step Transfer technique with stand by assistance with rolling walker  Toilet Transfer Step Transfer technique with stand by assistance with rolling walker  Functional Mobility: Patient completed x40ft x2 trials functional mobility with RW and SBA to increase dynamic standing balance and activity tolerance needed for ADL completion.  Provided with v/c for technique with transfers to increase safety and independence with completion  Significant education re: energy conservation and activity pacing.    Activities of Daily Living:  Grooming: set up assistance  Toileting: completed [place] hygiene from [facility] with setup for item retrieval    Cognitive/Visual Perceptual:  Cognitive/Psychosocial Skills:    -     Oriented to: Person, Place, Time, Situation  -     Follows Commands/attention: Follows one-step commands    Physical Exam:  Balance:    -     Sitting: supervision  -     Standing: contact guard assistance  Upper Extremity Range of Motion:     -       Right Upper Extremity: WFL  -       Left Upper Extremity: WFL  Upper Extremity Strength:    -       Right Upper Extremity: Deficits: grossly 4/5  -       Left Upper  Extremity: Deficits: grossly 4/5   Strength:    -       Right Upper Extremity: Deficits: fair  -       Left Upper Extremity: Deficits: fair    AMPAC 6 Click ADL:  AMPAC Total Score: 20    Treatment & Education:  Therapist provided facilitation and instruction of proper body mechanics, energy conservation, and fall prevention strategies during tasks listed above  Patient educated on role of OT, POC, and goals for therapy  Patient educated on importance of OOB activities with staff member assistance and sitting OOB majority of the day  Encouraged completion of B UE AROM therex throughout the day to increase functional strength and activity tolerance needed for ADL completion.    Patient left HOB elevated with all lines intact, call button in reach, and RN present.    GOALS:   Multidisciplinary Problems       Occupational Therapy Goals          Problem: Occupational Therapy    Goal Priority Disciplines Outcome Interventions   Occupational Therapy Goal     OT, PT/OT     Description: Goals to be met by: 5/28/24     Patient will increase functional independence with ADLs by performing:    Toileting from toilet with Supervision for hygiene and clothing management.   Toilet transfer to toilet with Supervision.  Increased functional strength in B UE grossly by 1/2 MM grade.                         History:     Past Medical History:   Diagnosis Date    Arthritis     hands    Bilateral bunions     Borderline glaucoma     De Quervain's disease (radial styloid tenosynovitis)     Gastritis     upper GI 2/2017    Hydradenitis     Hyperlipidemia     Hypertension     Insomnia     Migraines 02/01/2000    Nasal septum perforation     Obesity     Pneumonia     Restrictive airway disease     Sleep apnea     SVT (supraventricular tachycardia) 09/2013    Trigger finger     Type 2 diabetes mellitus 2012     am 02/01/2024         Past Surgical History:   Procedure Laterality Date    AXILLARY HIDRADENITIS EXCISION Bilateral      BONE EXOSTOSIS EXCISION Right 2018    Procedure: EXCISION, EXOSTOSIS;  Surgeon: Jayro Pedraza Sr., MD;  Location: [facility];  Service: Orthopedics;  Laterality: Right;    BREAST BIOPSY Bilateral     both benign    BREAST SURGERY  1998    CARPAL TUNNEL RELEASE      bilateral    CARPAL TUNNEL RELEASE Right 2024    Procedure: RELEASE, CARPAL TUNNEL;  Surgeon: Jaime Oswald MD;  Location: [facility] OR;  Service: Orthopedics;  Laterality: Right;    CARPAL TUNNEL RELEASE Left 2024    Procedure: RELEASE, CARPAL TUNNEL;  Surgeon: Jaime Oswald MD;  Location: [facility] OR;  Service: Orthopedics;  Laterality: Left;    CATARACT EXTRACTION Bilateral     OU     SECTION  1979    CHOLECYSTECTOMY  2014    COLONOSCOPY N/A 10/02/2020    Procedure: COLONOSCOPY;  Surgeon: Tushar Edwards MD;  Location: [facility];  Service: Endoscopy;  Laterality: N/A;    COLONOSCOPY W/ POLYPECTOMY  10/02/2020    Polyps x3, repeat 5 years; Tushar Edwards MD     CYST REMOVAL  2015    sebaceous cyst removed from face    DE QUERVAIN'S RELEASE Left 2020    Procedure: RELEASE, HAND, FOR DEQUERVAIN'S TENOSYNOVITIS;  Surgeon: Jaime Oswald MD;  Location: [facility] OR;  Service: Orthopedics;  Laterality: Left;    DE QUERVAIN'S RELEASE Right 2020    Procedure: RELEASE, HAND, FOR DEQUERVAIN'S TENOSYNOVITIS;  Surgeon: Jaime Oswald MD;  Location: [facility];  Service: Orthopedics;  Laterality: Right;    ENDOBRONCHIAL ULTRASOUND Bilateral 04/10/2024    Procedure: ENDOBRONCHIAL ULTRASOUND (EBUS);  Surgeon: Adrian Robin MD;  Location: [facility];  Service: Pulmonary;  Laterality: Bilateral;    EYE SURGERY      gastric sleeve  2017    Dr. Watson    INJECTION OF ANESTHETIC AGENT AROUND MULTIPLE INTERCOSTAL NERVES  5/10/2024    Procedure: BLOCK, NERVE, INTERCOSTAL, 2 OR MORE;  Surgeon: Mike Nuñez MD;  Location: [facility];  Service: Cardiothoracic;;    KNEE SURGERY Right     OLECRANON  BURSECTOMY Right 07/25/2018    Procedure: BURSECTOMY, OLECRANON;  Surgeon: Jayro Pedraza Sr., MD;  Location: [facility] OR;  Service: Orthopedics;  Laterality: Right;    SURGICAL REMOVAL OF BUNION WITH OSTEOTOMY OF METATARSAL BONE Left 05/10/2019    Procedure: BUNIONECTOMY, WITH METATARSAL OSTEOTOMY;  Surgeon: Srinivasan Villanueva DPM;  Location: [facility] OR;  Service: Podiatry;  Laterality: Left;    SURGICAL REMOVAL OF BUNION WITH OSTEOTOMY OF METATARSAL BONE Right 06/28/2019    Procedure: BUNIONECTOMY, WITH METATARSAL OSTEOTOMY;  Surgeon: Srinivasan Villanueva DPM;  Location: [facility] OR;  Service: Podiatry;  Laterality: Right;    SURGICAL REMOVAL OF LYMPH NODE Left 5/10/2024    Procedure: EXCISION, LYMPH NODE;  Surgeon: Mike Nuñez MD;  Location: [facility];  Service: Cardiothoracic;  Laterality: Left;    TONSILLECTOMY, ADENOIDECTOMY  1980s    TRIGGER FINGER RELEASE Right 04/01/2015    Dr. Milo HALL ROBOTIC RATS,WITH LOBECTOMY,LUNG Left 5/10/2024    Procedure: XI ROBOTIC RATS,WITH LOBECTOMY,LUNG;  Surgeon: Mike Nuñez MD;  Location: [facility];  Service: Cardiothoracic;  Laterality: Left;  Lingulectomy       Time Tracking:     OT Date of Treatment: 05/14/24  OT Start Time: 0755  OT Stop Time: 0825  OT Total Time (min): 30 min    Billable Minutes: Evaluation 15 and Self Care/Home Management 15    Carlita Santiago, OT  5/14/2024

## 2024-05-14 NOTE — CONSULTS
[facility] - Telemetry ([facility])  Cardiology  Consult Note    Patient Name: Cassandra Campos  MRN: [id number]  Admission Date: 5/10/2024  Hospital Length of Stay: 4 days  Code Status: Full Code   Attending Provider: Mike Nuñez MD   Consulting Provider: Twan Pelaez MD  Primary Care Physician: Emil Zhang MD  Principal Problem:Malignant neoplasm of lower lobe of left lung    Patient information was obtained from patient and ER records.     Inpatient consult to Cardiology  Consult performed by: Twan Pelaez MD  Consult ordered by: Mike Nuñez MD        Subjective:         HPI:   74 yo female, cardiology consult for AFL with rvr  PMH PSVT, HTN, HLP, NIDDM 10 yrs, obesity, ex smoker, DANIEL, not on CPAP. No h/o MI CVA  Had bariatric surgery done in . Uncomplicated and last 30 pounds.    05/10/19513 s/p biopsy for mediastinal lymph node with an EBUS which came back as negative for malignancy.   Developed AFL 2 mavis ago and on BB Rx.  EKG showed AFL with 2:1 conduction  05/01/24 echo showed EF nml, LA size nml  Cxr yesterday Moderate left lower lung and right mid lung airspace disease and/or atelectasis.   Now AFL HR at 140 bpm.  BP at 120 mmHg      Past Medical History:   Diagnosis Date    Arthritis     hands    Bilateral bunions     Borderline glaucoma     De Quervain's disease (radial styloid tenosynovitis)     Gastritis     upper GI 2/2017    Hydradenitis     Hyperlipidemia     Hypertension     Insomnia     Migraines 02/01/2000    Nasal septum perforation     Obesity     Pneumonia     Restrictive airway disease     Sleep apnea     SVT (supraventricular tachycardia) 09/2013    Trigger finger     Type 2 diabetes mellitus 2012     am 02/01/2024       Past Surgical History:   Procedure Laterality Date    AXILLARY HIDRADENITIS EXCISION Bilateral     BONE EXOSTOSIS EXCISION Right 07/25/2018    Procedure: EXCISION, EXOSTOSIS;  Surgeon: Jayro Pedraza Sr., MD;  Location: [facility];  Service: Orthopedics;   Laterality: Right;    BREAST BIOPSY Bilateral     both benign    BREAST SURGERY  1998    CARPAL TUNNEL RELEASE      bilateral    CARPAL TUNNEL RELEASE Right 2024    Procedure: RELEASE, CARPAL TUNNEL;  Surgeon: Jaime Oswald MD;  Location: [facility] OR;  Service: Orthopedics;  Laterality: Right;    CARPAL TUNNEL RELEASE Left 2024    Procedure: RELEASE, CARPAL TUNNEL;  Surgeon: Jaime Oswald MD;  Location: [facility] OR;  Service: Orthopedics;  Laterality: Left;    CATARACT EXTRACTION Bilateral     OU     SECTION  1979    CHOLECYSTECTOMY  2014    COLONOSCOPY N/A 10/02/2020    Procedure: COLONOSCOPY;  Surgeon: Tushar Edwards MD;  Location: [facility];  Service: Endoscopy;  Laterality: N/A;    COLONOSCOPY W/ POLYPECTOMY  10/02/2020    Polyps x3, repeat 5 years; Tushar Edwards MD     CYST REMOVAL  2015    sebaceous cyst removed from face    DE QUERVAIN'S RELEASE Left 2020    Procedure: RELEASE, HAND, FOR DEQUERVAIN'S TENOSYNOVITIS;  Surgeon: Jaime Oswald MD;  Location: [facility] OR;  Service: Orthopedics;  Laterality: Left;    DE QUERVAIN'S RELEASE Right 2020    Procedure: RELEASE, HAND, FOR DEQUERVAIN'S TENOSYNOVITIS;  Surgeon: Jaime Oswald MD;  Location: [facility] OR;  Service: Orthopedics;  Laterality: Right;    ENDOBRONCHIAL ULTRASOUND Bilateral 04/10/2024    Procedure: ENDOBRONCHIAL ULTRASOUND (EBUS);  Surgeon: Adrian Robin MD;  Location: [facility];  Service: Pulmonary;  Laterality: Bilateral;    EYE SURGERY      gastric sleeve  2017    Dr. Watson    INJECTION OF ANESTHETIC AGENT AROUND MULTIPLE INTERCOSTAL NERVES  5/10/2024    Procedure: BLOCK, NERVE, INTERCOSTAL, 2 OR MORE;  Surgeon: Mike Nuñez MD;  Location: [facility] OR;  Service: Cardiothoracic;;    KNEE SURGERY Right     OLECRANON BURSECTOMY Right 2018    Procedure: BURSECTOMY, OLECRANON;  Surgeon: Jayro Pedraza Sr., MD;  Location: [facility] OR;  Service: Orthopedics;  Laterality:  Right;    SURGICAL REMOVAL OF BUNION WITH OSTEOTOMY OF METATARSAL BONE Left 05/10/2019    Procedure: BUNIONECTOMY, WITH METATARSAL OSTEOTOMY;  Surgeon: Srinivasan Villanueva DPM;  Location: [facility] OR;  Service: Podiatry;  Laterality: Left;    SURGICAL REMOVAL OF BUNION WITH OSTEOTOMY OF METATARSAL BONE Right 06/28/2019    Procedure: BUNIONECTOMY, WITH METATARSAL OSTEOTOMY;  Surgeon: Srinivasan Villanueva DPM;  Location: [facility] OR;  Service: Podiatry;  Laterality: Right;    SURGICAL REMOVAL OF LYMPH NODE Left 5/10/2024    Procedure: EXCISION, LYMPH NODE;  Surgeon: Mike Nuñez MD;  Location: [facility] OR;  Service: Cardiothoracic;  Laterality: Left;    TONSILLECTOMY, ADENOIDECTOMY  1980s    TRIGGER FINGER RELEASE Right 04/01/2015    Dr. Milo HALL ROBOTIC RATS,WITH LOBECTOMY,LUNG Left 5/10/2024    Procedure: XI ROBOTIC RATS,WITH LOBECTOMY,LUNG;  Surgeon: Mike Nuñez MD;  Location: [facility] OR;  Service: Cardiothoracic;  Laterality: Left;  Lingulectomy       Review of patient's allergies indicates:  No Known Allergies    Current Facility-Administered Medications on File Prior to Encounter   Medication    chlorhexidine 0.12 % solution 10 mL    lactated ringers infusion    nozaseptin (NOZIN) nasal      Current Outpatient Medications on File Prior to Encounter   Medication Sig    eszopiclone (LUNESTA) 3 mg Tab Take 1 tablet (3 mg total) by mouth every evening.    LORazepam (ATIVAN) 1 MG tablet Take 1 tablet (1 mg total) by mouth 2 (two) times daily.    losartan (COZAAR) 25 MG tablet TAKE 1 TABLET BY MOUTH ONCE  DAILY (Patient taking differently: Take 25 mg by mouth every evening.)    metoprolol succinate (TOPROL-XL) 50 MG 24 hr tablet TAKE 1 TABLET BY MOUTH ONCE  DAILY (Patient taking differently: Take 50 mg by mouth every evening.)    omeprazole (PRILOSEC) 20 MG capsule TAKE 1 CAPSULE BY MOUTH ONCE  DAILY (Patient taking differently: Take 20 mg by mouth every evening.)    albuterol (PROVENTIL/VENTOLIN HFA) 90  mcg/actuation inhaler INHALE 2 PUFFS INTO THE LUNGS EVERY 4 HOURS AS NEEDED FOR WHEEZING OR SHORTNESS OF BREATH.    amitriptyline (ELAVIL) 100 MG tablet Take 1 tablet (100 mg total) by mouth every evening.    blood glucose control, high (TRUE METRIX LEVEL 3) Soln 1 each by Other route once daily.    blood sugar diagnostic (ACCU-CHEK GUIDE TEST STRIPS) Strp USE TO TEST TWICE A DAY    blood sugar diagnostic Strp To check BG 2   times daily, to use with insurance preferred meter    blood-glucose meter (ACCU-CHEK GUIDE ME GLUCOSE MTR) Misc use to check blood glucose 2 times a day    blood-glucose meter Misc 1 each by Misc.(Non-Drug; Combo Route) route 2 (two) times a day.    blood-glucose sensor (DEXCOM G7 SENSOR) Wilma 1 each by Misc.(Non-Drug; Combo Route) route continuous.    cholecalciferol, vitamin D3, (VITAMIN D3) 25 mcg (1,000 unit) capsule Take 1,000 Units by mouth once daily.    lancets Misc Use to check blood glucose 2 times daily    TRUEPLUS LANCETS 33 gauge Misc TEST BLOOD SUGAR ONE TIME DAILY     Family History       Problem Relation (Age of Onset)    Diabetes Maternal Aunt, Cousin    Hypertension Maternal Grandmother    Prostate cancer Brother          Tobacco Use    Smoking status: Former     Current packs/day: 0.00     Average packs/day: 0.5 packs/day for 42.0 years (21.0 ttl pk-yrs)     Types: Cigarettes     Start date: 1970     Quit date: 2012     Years since quittin.3     Passive exposure: Past    Smokeless tobacco: Never   Substance and Sexual Activity    Alcohol use: Not Currently     Alcohol/week: 1.0 standard drink of alcohol     Types: 1 Glasses of wine per week     Comment: Glass red wine once a every 2 weeks    Drug use: Never    Sexual activity: Not Currently     Partners: Female     Birth control/protection: Abstinence, None     Review of Systems   Constitutional: Negative for decreased appetite, diaphoresis, fever, malaise/fatigue and night sweats.   HENT:  Negative for  nosebleeds.    Eyes:  Negative for blurred vision and double vision.   Cardiovascular:  Positive for palpitations. Negative for chest pain, claudication, dyspnea on exertion, irregular heartbeat, leg swelling, near-syncope, orthopnea, paroxysmal nocturnal dyspnea and syncope.   Respiratory:  Positive for cough. Negative for shortness of breath, sleep disturbances due to breathing, snoring, sputum production and wheezing.    Endocrine: Negative for cold intolerance and polyuria.   Hematologic/Lymphatic: Does not bruise/bleed easily.   Skin:  Negative for rash.   Musculoskeletal:  Negative for back pain, falls, joint pain, joint swelling and neck pain.   Gastrointestinal:  Negative for abdominal pain, heartburn, nausea and vomiting.   Genitourinary:  Negative for dysuria, frequency and hematuria.   Neurological:  Negative for difficulty with concentration, dizziness, focal weakness, headaches, light-headedness, numbness, seizures and weakness.   Psychiatric/Behavioral:  Negative for depression, memory loss and substance abuse. The patient does not have insomnia.    Allergic/Immunologic: Negative for HIV exposure and hives.     Objective:     Vital Signs (Most Recent):  Temp: 99.5 °F (37.5 °C) (05/14/24 0712)  Pulse: (!) 133 (05/14/24 1020)  Resp: 20 (05/14/24 0731)  BP: 116/79 (05/14/24 1020)  SpO2: (!) 92 % (05/14/24 0731) Vital Signs (24h Range):  Temp:  [98.7 °F (37.1 °C)-99.5 °F (37.5 °C)] 99.5 °F (37.5 °C)  Pulse:  [130-144] 133  Resp:  [16-20] 20  SpO2:  [91 %-99 %] 92 %  BP: (102-138)/(71-90) 116/79     Weight: 84.1 kg (185 lb 6.5 oz)  Body mass index is 32.84 kg/m².    SpO2: (!) 92 %         Intake/Output Summary (Last 24 hours) at 5/14/2024 1041  Last data filed at 5/13/2024 1804  Gross per 24 hour   Intake 275.67 ml   Output --   Net 275.67 ml       Lines/Drains/Airways       Drain  Duration             Female External Urinary Catheter w/ Suction 05/12/24 2316 1 day              Peripheral Intravenous Line   "Duration                  Peripheral IV - Single Lumen 05/10/24 0930 20 G Left Hand 4 days         Peripheral IV - Single Lumen 05/10/24 1827 18 G Right Forearm 3 days                     Physical Exam  HENT:      Head: Normocephalic.   Eyes:      Pupils: Pupils are equal, round, and reactive to light.   Neck:      Thyroid: No thyromegaly.      Vascular: Normal carotid pulses. No carotid bruit or JVD.   Cardiovascular:      Rate and Rhythm: Tachycardia present. Rhythm irregular. No extrasystoles are present.     Chest Wall: PMI is not displaced.      Pulses: Normal pulses.           Carotid pulses are 2+ on the right side and 2+ on the left side.     Heart sounds: Normal heart sounds. No murmur heard.     No gallop. No S3 sounds.   Pulmonary:      Effort: No respiratory distress.      Breath sounds: Normal breath sounds. No stridor.   Abdominal:      General: Bowel sounds are normal.      Palpations: Abdomen is soft.      Tenderness: There is no abdominal tenderness. There is no rebound.   Musculoskeletal:         General: Normal range of motion.   Skin:     Findings: No rash.   Neurological:      Mental Status: She is alert and oriented to person, place, and time.   Psychiatric:         Behavior: Behavior normal.          Significant Labs: ABG: No results for input(s): "PH", "PCO2", "HCO3", "POCSATURATED", "BE" in the last 48 hours., Blood Culture: No results for input(s): "LABBLOO" in the last 48 hours., BMP: No results for input(s): "GLU", "NA", "K", "CL", "CO2", "BUN", "CREATININE", "CALCIUM", "MG" in the last 48 hours., CMP No results for input(s): "NA", "K", "CL", "CO2", "GLU", "BUN", "CREATININE", "CALCIUM", "PROT", "ALBUMIN", "BILITOT", "ALKPHOS", "AST", "ALT", "ANIONGAP", "ESTGFRAFRICA", "EGFRNONAA" in the last 48 hours., CBC   Recent Labs   Lab 05/13/24  0525 05/14/24  0501   WBC 9.30 8.09   HGB 11.3* 11.6*   HCT 33.9* 34.5*    271   , INR No results for input(s): "INR", "PROTIME" in the last 48 " "hours., Lipid Panel No results for input(s): "CHOL", "HDL", "LDLCALC", "TRIG", "CHOLHDL" in the last 48 hours., and Troponin No results for input(s): "TROPONINI" in the last 48 hours.    Significant Imaging: EKG:    Assessment and Plan:     Typical atrial flutter  New Dx of AFL with RVR postop  EF and LA size nml    -- add heparin gtt  ok with Dr. Nuñez  --add Cardizem 5 mg ivp now  -- keep NPO now LAKESHIA and DCCV if remains AFL            VTE Risk Mitigation (From admission, onward)           Ordered     heparin 25,000 units in dextrose 5% (100 units/ml) IV bolus from bag LOW INTENSITY nomogram - [facility]  As needed (PRN)        Question:  Heparin Infusion Adjustment (DO NOT MODIFY ANSWER)  Answer:  [email or web address]\[email or web address]\Images\Pharmacy\HeparinInfusions\heparin LOW INTENSITY nomogram for [facility] [id number]    05/14/24 1046     heparin 25,000 units in dextrose 5% (100 units/ml) IV bolus from bag LOW INTENSITY nomogram - [facility]  As needed (PRN)        Question:  Heparin Infusion Adjustment (DO NOT MODIFY ANSWER)  Answer:  [email or web address]\epic\Images\Pharmacy\HeparinInfusions\heparin LOW INTENSITY nomogram for [facility] [id number]    05/14/24 1046     heparin 25,000 units in dextrose 5% (100 units/ml) IV bolus from bag LOW INTENSITY nomogram - [facility]  Once        Question:  Heparin Infusion Adjustment (DO NOT MODIFY ANSWER)  Answer:  [email or web address]\epic\Images\Pharmacy\HeparinInfusions\heparin LOW INTENSITY nomogram for [facility] [id number]    05/14/24 1046     heparin 25,000 units in dextrose 5% 250 mL (100 units/mL) infusion LOW INTENSITY nomogram - [facility]  Continuous        Question:  Begin at (units/kg/hr)  Answer:  12    05/14/24 1046     IP VTE HIGH RISK PATIENT  Once         05/10/24 1802     Place sequential compression device  Until discontinued         05/10/24 1802                    Thank you for your consult. I will follow-up with patient. Please contact us if you have any additional questions.    Twan Pelaez MD  Cardiology   [facility] - " Telemetry ([facility])

## 2024-05-14 NOTE — HPI
74 yo female, cardiology consult for AFL with rvr  PMH PSVT, HTN, HLP, NIDDM 10 yrs, obesity, ex smoker, DANIEL, not on CPAP. No h/o MI CVA  Had bariatric surgery done in . Uncomplicated and last 30 pounds.    05/10/58162 s/p biopsy for mediastinal lymph node with an EBUS which came back as negative for malignancy.   Developed AFL 2 mavis ago and on BB Rx.  EKG showed AFL with 2:1 conduction  05/01/24 echo showed EF nml, LA size nml  Cxr yesterday Moderate left lower lung and right mid lung airspace disease and/or atelectasis.   Now AFL HR at 140 bpm.  BP at 120 mmHg

## 2024-05-14 NOTE — ASSESSMENT & PLAN NOTE
New Dx of AFL with RVR postop  EF and LA size nml    -- add heparin gtt  ok with Dr. Nuñez  --add Cardizem 5 mg ivp now  -- keep NPO now LAKESHIA and DCCV if remains AFL

## 2024-05-15 ENCOUNTER — ANESTHESIA EVENT (OUTPATIENT)
Dept: CARDIOLOGY | Facility: HOSPITAL | Age: 74
DRG: 164 | End: 2024-05-15
Payer: MEDICARE

## 2024-05-15 ENCOUNTER — ANESTHESIA (OUTPATIENT)
Dept: CARDIOLOGY | Facility: HOSPITAL | Age: 74
DRG: 164 | End: 2024-05-15
Payer: MEDICARE

## 2024-05-15 VITALS
BODY MASS INDEX: 29.59 KG/M2 | HEART RATE: 90 BPM | SYSTOLIC BLOOD PRESSURE: 105 MMHG | DIASTOLIC BLOOD PRESSURE: 60 MMHG | OXYGEN SATURATION: 97 % | TEMPERATURE: 98 F | RESPIRATION RATE: 19 BRPM | HEIGHT: 63 IN | WEIGHT: 167 LBS

## 2024-05-15 LAB
ANION GAP SERPL CALC-SCNC: 8 MMOL/L (ref 8–16)
APTT PPP: 29.1 SEC (ref 21–32)
APTT PPP: 31.6 SEC (ref 21–32)
ASCENDING AORTA: 3.1 CM
BASOPHILS # BLD AUTO: 0.04 K/UL (ref 0–0.2)
BASOPHILS # BLD AUTO: 0.04 K/UL (ref 0–0.2)
BASOPHILS NFR BLD: 0.6 % (ref 0–1.9)
BASOPHILS NFR BLD: 0.6 % (ref 0–1.9)
BSA FOR ECHO PROCEDURE: 1.83 M2
BUN SERPL-MCNC: 7 MG/DL (ref 8–23)
CALCIUM SERPL-MCNC: 9.2 MG/DL (ref 8.7–10.5)
CHLORIDE SERPL-SCNC: 104 MMOL/L (ref 95–110)
CO2 SERPL-SCNC: 26 MMOL/L (ref 23–29)
CREAT SERPL-MCNC: 0.6 MG/DL (ref 0.5–1.4)
DIFFERENTIAL METHOD BLD: ABNORMAL
DIFFERENTIAL METHOD BLD: ABNORMAL
EOSINOPHIL # BLD AUTO: 0.1 K/UL (ref 0–0.5)
EOSINOPHIL # BLD AUTO: 0.1 K/UL (ref 0–0.5)
EOSINOPHIL NFR BLD: 2.1 % (ref 0–8)
EOSINOPHIL NFR BLD: 2.1 % (ref 0–8)
ERYTHROCYTE [DISTWIDTH] IN BLOOD BY AUTOMATED COUNT: 13.7 % (ref 11.5–14.5)
ERYTHROCYTE [DISTWIDTH] IN BLOOD BY AUTOMATED COUNT: 13.7 % (ref 11.5–14.5)
EST. GFR  (NO RACE VARIABLE): >60 ML/MIN/1.73 M^2
GLUCOSE SERPL-MCNC: 152 MG/DL (ref 70–110)
HCT VFR BLD AUTO: 33.6 % (ref 37–48.5)
HCT VFR BLD AUTO: 33.6 % (ref 37–48.5)
HGB BLD-MCNC: 11.6 G/DL (ref 12–16)
HGB BLD-MCNC: 11.6 G/DL (ref 12–16)
IMM GRANULOCYTES # BLD AUTO: 0.03 K/UL (ref 0–0.04)
IMM GRANULOCYTES # BLD AUTO: 0.03 K/UL (ref 0–0.04)
IMM GRANULOCYTES NFR BLD AUTO: 0.5 % (ref 0–0.5)
IMM GRANULOCYTES NFR BLD AUTO: 0.5 % (ref 0–0.5)
LYMPHOCYTES # BLD AUTO: 1.7 K/UL (ref 1–4.8)
LYMPHOCYTES # BLD AUTO: 1.7 K/UL (ref 1–4.8)
LYMPHOCYTES NFR BLD: 25.8 % (ref 18–48)
LYMPHOCYTES NFR BLD: 25.8 % (ref 18–48)
MCH RBC QN AUTO: 25.8 PG (ref 27–31)
MCH RBC QN AUTO: 25.8 PG (ref 27–31)
MCHC RBC AUTO-ENTMCNC: 34.5 G/DL (ref 32–36)
MCHC RBC AUTO-ENTMCNC: 34.5 G/DL (ref 32–36)
MCV RBC AUTO: 75 FL (ref 82–98)
MCV RBC AUTO: 75 FL (ref 82–98)
MONOCYTES # BLD AUTO: 0.6 K/UL (ref 0.3–1)
MONOCYTES # BLD AUTO: 0.6 K/UL (ref 0.3–1)
MONOCYTES NFR BLD: 9 % (ref 4–15)
MONOCYTES NFR BLD: 9 % (ref 4–15)
NEUTROPHILS # BLD AUTO: 4.1 K/UL (ref 1.8–7.7)
NEUTROPHILS # BLD AUTO: 4.1 K/UL (ref 1.8–7.7)
NEUTROPHILS NFR BLD: 62 % (ref 38–73)
NEUTROPHILS NFR BLD: 62 % (ref 38–73)
NRBC BLD-RTO: 0 /100 WBC
NRBC BLD-RTO: 0 /100 WBC
PLATELET # BLD AUTO: 279 K/UL (ref 150–450)
PLATELET # BLD AUTO: 279 K/UL (ref 150–450)
PMV BLD AUTO: 9 FL (ref 9.2–12.9)
PMV BLD AUTO: 9 FL (ref 9.2–12.9)
POTASSIUM SERPL-SCNC: 3.5 MMOL/L (ref 3.5–5.1)
RA PRESSURE ESTIMATED: 3 MMHG
RBC # BLD AUTO: 4.5 M/UL (ref 4–5.4)
RBC # BLD AUTO: 4.5 M/UL (ref 4–5.4)
SINUS: 3.2 CM
SODIUM SERPL-SCNC: 138 MMOL/L (ref 136–145)
STJ: 2.5 CM
WBC # BLD AUTO: 6.59 K/UL (ref 3.9–12.7)
WBC # BLD AUTO: 6.59 K/UL (ref 3.9–12.7)

## 2024-05-15 PROCEDURE — 92960 CARDIOVERSION ELECTRIC EXT: CPT | Performed by: INTERNAL MEDICINE

## 2024-05-15 PROCEDURE — 93010 ELECTROCARDIOGRAM REPORT: CPT | Mod: 76,,, | Performed by: INTERNAL MEDICINE

## 2024-05-15 PROCEDURE — 97530 THERAPEUTIC ACTIVITIES: CPT

## 2024-05-15 PROCEDURE — 94640 AIRWAY INHALATION TREATMENT: CPT

## 2024-05-15 PROCEDURE — 93005 ELECTROCARDIOGRAM TRACING: CPT

## 2024-05-15 PROCEDURE — 25000242 PHARM REV CODE 250 ALT 637 W/ HCPCS: Performed by: INTERNAL MEDICINE

## 2024-05-15 PROCEDURE — 94761 N-INVAS EAR/PLS OXIMETRY MLT: CPT

## 2024-05-15 PROCEDURE — 25000003 PHARM REV CODE 250: Performed by: NURSE ANESTHETIST, CERTIFIED REGISTERED

## 2024-05-15 PROCEDURE — 63600175 PHARM REV CODE 636 W HCPCS: Performed by: NURSE ANESTHETIST, CERTIFIED REGISTERED

## 2024-05-15 PROCEDURE — 63600175 PHARM REV CODE 636 W HCPCS: Performed by: INTERNAL MEDICINE

## 2024-05-15 PROCEDURE — 37000009 HC ANESTHESIA EA ADD 15 MINS: Performed by: INTERNAL MEDICINE

## 2024-05-15 PROCEDURE — 80048 BASIC METABOLIC PNL TOTAL CA: CPT | Performed by: THORACIC SURGERY (CARDIOTHORACIC VASCULAR SURGERY)

## 2024-05-15 PROCEDURE — 93010 ELECTROCARDIOGRAM REPORT: CPT | Mod: ,,, | Performed by: INTERNAL MEDICINE

## 2024-05-15 PROCEDURE — 97116 GAIT TRAINING THERAPY: CPT

## 2024-05-15 PROCEDURE — 85730 THROMBOPLASTIN TIME PARTIAL: CPT | Performed by: THORACIC SURGERY (CARDIOTHORACIC VASCULAR SURGERY)

## 2024-05-15 PROCEDURE — 5A2204Z RESTORATION OF CARDIAC RHYTHM, SINGLE: ICD-10-PCS | Performed by: INTERNAL MEDICINE

## 2024-05-15 PROCEDURE — 25000003 PHARM REV CODE 250: Performed by: THORACIC SURGERY (CARDIOTHORACIC VASCULAR SURGERY)

## 2024-05-15 PROCEDURE — 85025 COMPLETE CBC W/AUTO DIFF WBC: CPT | Performed by: THORACIC SURGERY (CARDIOTHORACIC VASCULAR SURGERY)

## 2024-05-15 PROCEDURE — 92960 CARDIOVERSION ELECTRIC EXT: CPT | Mod: ,,, | Performed by: INTERNAL MEDICINE

## 2024-05-15 PROCEDURE — 36415 COLL VENOUS BLD VENIPUNCTURE: CPT | Performed by: THORACIC SURGERY (CARDIOTHORACIC VASCULAR SURGERY)

## 2024-05-15 PROCEDURE — 37000008 HC ANESTHESIA 1ST 15 MINUTES: Performed by: INTERNAL MEDICINE

## 2024-05-15 RX ORDER — LIDOCAINE HYDROCHLORIDE 20 MG/ML
INJECTION, SOLUTION EPIDURAL; INFILTRATION; INTRACAUDAL; PERINEURAL
Status: DISCONTINUED | OUTPATIENT
Start: 2024-05-15 | End: 2024-05-15

## 2024-05-15 RX ORDER — PROPOFOL 10 MG/ML
VIAL (ML) INTRAVENOUS
Status: DISCONTINUED | OUTPATIENT
Start: 2024-05-15 | End: 2024-05-15

## 2024-05-15 RX ORDER — LIDOCAINE 50 MG/G
1 PATCH TOPICAL DAILY
Qty: 15 PATCH | Refills: 1 | Status: SHIPPED | OUTPATIENT
Start: 2024-05-15

## 2024-05-15 RX ORDER — IPRATROPIUM BROMIDE 0.5 MG/2.5ML
0.5 SOLUTION RESPIRATORY (INHALATION) 2 TIMES DAILY
Status: DISCONTINUED | OUTPATIENT
Start: 2024-05-15 | End: 2024-05-15 | Stop reason: HOSPADM

## 2024-05-15 RX ORDER — SODIUM CHLORIDE 9 MG/ML
INJECTION, SOLUTION INTRAVENOUS ONCE
Status: DISCONTINUED | OUTPATIENT
Start: 2024-05-15 | End: 2024-05-15 | Stop reason: HOSPADM

## 2024-05-15 RX ORDER — OXYCODONE AND ACETAMINOPHEN 5; 325 MG/1; MG/1
1 TABLET ORAL EVERY 6 HOURS PRN
Qty: 20 TABLET | Refills: 0 | Status: SHIPPED | OUTPATIENT
Start: 2024-05-15 | End: 2024-05-30

## 2024-05-15 RX ORDER — SODIUM CHLORIDE 0.9 % (FLUSH) 0.9 %
10 SYRINGE (ML) INJECTION
Status: DISCONTINUED | OUTPATIENT
Start: 2024-05-15 | End: 2024-05-15 | Stop reason: HOSPADM

## 2024-05-15 RX ADMIN — METOPROLOL TARTRATE 50 MG: 50 TABLET, FILM COATED ORAL at 08:05

## 2024-05-15 RX ADMIN — LIDOCAINE HYDROCHLORIDE 100 MG: 20 INJECTION, SOLUTION EPIDURAL; INFILTRATION; INTRACAUDAL; PERINEURAL at 12:05

## 2024-05-15 RX ADMIN — ESOMEPRAZOLE MAGNESIUM 10 MG: 2.5 GRANULE, DELAYED RELEASE ORAL at 05:05

## 2024-05-15 RX ADMIN — PROPOFOL 50 MG: 10 INJECTION, EMULSION INTRAVENOUS at 12:05

## 2024-05-15 RX ADMIN — Medication 10 MG/HR: at 04:05

## 2024-05-15 RX ADMIN — LIDOCAINE 1 PATCH: 50 PATCH CUTANEOUS at 08:05

## 2024-05-15 RX ADMIN — HEPARIN SODIUM 18 UNITS/KG/HR: 10000 INJECTION, SOLUTION INTRAVENOUS at 05:05

## 2024-05-15 RX ADMIN — SODIUM CHLORIDE: 9 INJECTION, SOLUTION INTRAVENOUS at 11:05

## 2024-05-15 RX ADMIN — MUPIROCIN 1 G: 20 OINTMENT TOPICAL at 09:05

## 2024-05-15 RX ADMIN — IPRATROPIUM BROMIDE 0.5 MG: 0.5 SOLUTION RESPIRATORY (INHALATION) at 08:05

## 2024-05-15 RX ADMIN — FAMOTIDINE 20 MG: 20 TABLET ORAL at 08:05

## 2024-05-15 RX ADMIN — POLYETHYLENE GLYCOL 3350 17 G: 17 POWDER, FOR SOLUTION ORAL at 03:05

## 2024-05-15 NOTE — ANESTHESIA PREPROCEDURE EVALUATION
05/15/2024  Cassandra Campos is a 73 y.o., female.      Pre-op Assessment    I have reviewed the Patient Summary Reports.    I have reviewed the NPO Status.   I have reviewed the Medications.     Review of Systems  Social:  Former Smoker       Cardiovascular:     Hypertension    Dysrhythmias atrial fibrillation      hyperlipidemia    Atrial flutter with 2:1 A-V conduction   Left axis deviation   Pulmonary disease pattern   Inferior infarct ,age undetermined   Abnormal ECG   When compared with ECG of 22-APR-2024 07:49,   Atrial flutter has replaced Sinus rhythm         Summary       ·  Left Ventricle: The left ventricle is normal in size. Normal wall thickness. There is concentric remodeling. Normal wall motion. Septal motion is consistent with bundle branch block. There is normal systolic function. Ejection fraction by visual approximation is 55%. There is normal diastolic function.  ·  Right Ventricle: Normal right ventricular cavity size. Wall thickness is normal. Systolic function is normal.  ·  Tricuspid Valve: There is moderate regurgitation with a centrally directed jet.  ·  Pulmonary Artery: The estimated pulmonary artery systolic pressure is 29 mmHg.  ·  IVC/SVC: Normal venous pressure at 3 mmHg.                           Pulmonary:    Asthma    Sleep Apnea                Hepatic/GI:     GERD             Musculoskeletal:  Arthritis               Neurological:      Headaches                                 Endocrine:  Diabetes, type 2         Obesity / BMI > 30  Psych:  Psychiatric History anxiety depression                Physical Exam  General: Cooperative, Alert, Oriented and Well nourished    Airway:  Mallampati: III   Mouth Opening: Normal  TM Distance: Normal  Tongue: Normal  Neck ROM: Normal ROM    Dental:  Dentures, Edentulous        Anesthesia Plan  Type of Anesthesia, risks & benefits  discussed:    Anesthesia Type: MAC  Intra-op Monitoring Plan: Standard ASA Monitors  Post Op Pain Control Plan: multimodal analgesia  Induction:  IV  Informed Consent: Informed consent signed with the Patient and all parties understand the risks and agree with anesthesia plan.  All questions answered.   ASA Score: 3  Day of Surgery Review of History & Physical: H&P Update referred to the surgeon/provider.I have interviewed and examined the patient. I have reviewed the patient's H&P dated: There are no significant changes.     Ready For Surgery From Anesthesia Perspective.     .       57y/oM CAD, DM, s/p renal transplant presenting with R hallux ulcer    Suspected OM ID following    Renal transplant  CKD suspect stage III  Noted immunosuppressant meds  Cont Tacrolimus / Cellcept  Tacrolimus level pending    HTN BP not ideal meds noted will inc Hydralazine to 50 mg BID    WIll follow     57y/oM CAD, DM, s/p renal transplant presenting with R hallux ulcer    Suspected OM ID following    Renal transplant  CKD suspect stage III  Noted immunosuppressant meds  Cont Tacrolimus / Cellcept  Tacrolimus level pending    HTN BP not ideal meds noted will inc Hydralazine to 50 mg BID    Renally dose meds  Avoid nephrotoxic agents  WIll follow

## 2024-05-15 NOTE — INTERVAL H&P NOTE
The patient has been examined and the H&P has been reviewed:    I concur with the findings and no changes have occurred since H&P was written.    Anesthesia/Surgery risks, benefits and alternative options discussed and understood by patient/family.    LAKESHIA and DCCV for AFL      Active Hospital Problems    Diagnosis  POA    *Malignant neoplasm of lower lobe of left lung [C34.32]  Yes    Typical atrial flutter [I48.3]  Unknown    Paroxysmal SVT (supraventricular tachycardia) [I47.10]  Yes    Obesity [E66.9]  Yes     Chronic      Resolved Hospital Problems    Diagnosis Date Resolved POA    Atrial flutter [I48.92] 05/14/2024 Unknown

## 2024-05-15 NOTE — ANESTHESIA POSTPROCEDURE EVALUATION
Anesthesia Post Evaluation    Patient: Cassandra Campos    Procedure(s) Performed: Procedure(s) (LRB):  Transesophageal echo (LAKESHIA) intra-procedure log documentation (N/A)    Final Anesthesia Type: MAC      Patient location during evaluation: Cath Lab (CV)  Patient participation: Yes- Able to Participate  Level of consciousness: awake and alert  Post-procedure vital signs: reviewed and stable  Pain management: adequate  Airway patency: patent    PONV status at discharge: No PONV  Anesthetic complications: no      Cardiovascular status: hemodynamically stable  Respiratory status: unassisted, room air and spontaneous ventilation  Hydration status: euvolemic  Follow-up not needed.              Vitals Value Taken Time   /63 05/15/24 1219   Temp  05/15/24 1220   Pulse 95 05/15/24 1219   Resp 39 05/15/24 1219   SpO2 97 % 05/15/24 1219   Vitals shown include unfiled device data.      No case tracking events are documented in the log.      Pain/Francheska Score: Francheska Score: 10 (5/15/2024 12:15 PM)

## 2024-05-15 NOTE — PROGRESS NOTES
Subjective:      Patient ID: Cassandra Campos is a 73 y.o. female.    Chief Complaint: No chief complaint on file.    HPI:  73-year-old female who has a ex-smoker found to have a left upper lobe pulmonary nodule had a biopsy which showed adenocarcinoma.  Patient has history of hypertension type 2 diabetes mellitus and hyperlipidemia.  No history of cough no history of hemoptysis at this time.  She had a full workup including a PET scan which showed left upper lobe lesion which has high activity and a mediastinal lymph node.  She underwent biopsy for mediastinal lymph node with an EBUS which came back as negative for malignancy.  Date of Procedure: 5/10/2024      Procedure: Procedure(s) (LRB):  XI ROBOTIC RATS,WITH LOBECTOMY,LUNG (Left)  EXCISION, LYMPH NODE (Left)  BLOCK, NERVE, INTERCOSTAL, 2 OR MORE  Lingulectomy   The external lymph node dissection     Surgeons and Role:     * Mike Nuñez MD - Primary     Assisting Surgeon: None     Pre-Operative Diagnosis: Malignant neoplasm of upper lobe of left lung [C34.32]  Adenocarcinoma of the left upper lobe  Hypertension  Hyperlipidemia  Obesity  Sleep apnea  COPD  Type 2 diabetes mellitus  Depression     Post-Operative Diagnosis:   Same    Pain overnight chest tube draining serosanguineous drainage no air leak chest tube to water seal this morning incentive spirometry less than 500 mostly splinting from the pain.    05/12/2024 the patient had urinary retention overnight for which the [place] was reintroduced she is still having pain issues chest tube draining minimal fluid.  Issues with ambulation    05/13/2024 patient had a episode of desaturate last night for which she was put on non-rebreather chest x-ray showed no evidence of a pneumothorax was stable in the morning still requiring high oxygen incentive spirometry 500.    05/14/2024 the patient pain issues were better ambulating her heart drain was in the 140s atrial flutter.    05/15/2024 the patient is  still in atrial flutter waiting for cardioversion today heart rate is in the 110s otherwise hemodynamically stable off oxygen ambulating pain well controlled    Review of patient's allergies indicates:  No Known Allergies    Past Medical History:   Diagnosis Date    Arthritis     hands    Bilateral bunions     Borderline glaucoma     De Quervain's disease (radial styloid tenosynovitis)     Gastritis     upper GI 2017    Hydradenitis     Hyperlipidemia     Hypertension     Insomnia     Migraines 2000    Nasal septum perforation     Obesity     Pneumonia     Restrictive airway disease     Sleep apnea     SVT (supraventricular tachycardia) 2013    Trigger finger     Type 2 diabetes mellitus 2012     am 2024       Family History   Problem Relation Name Age of Onset    Prostate cancer Brother      Diabetes Maternal Aunt Alondra Campos     Diabetes Cousin      Hypertension Maternal Grandmother Portia Orosco        Social History     Socioeconomic History    Marital status:     Number of children: 1   Occupational History    Occupation:  aid   Tobacco Use    Smoking status: Former     Current packs/day: 0.00     Average packs/day: 0.5 packs/day for 42.0 years (21.0 ttl pk-yrs)     Types: Cigarettes     Start date: 1970     Quit date: 2012     Years since quittin.3     Passive exposure: Past    Smokeless tobacco: Never   Substance and Sexual Activity    Alcohol use: Not Currently     Alcohol/week: 1.0 standard drink of alcohol     Types: 1 Glasses of wine per week     Comment: Glass red wine once a every 2 weeks    Drug use: Never    Sexual activity: Not Currently     Partners: Female     Birth control/protection: Abstinence, None   Social History Narrative    Single, part-time teacher. Masters degree biology.      Social Determinants of Health     Financial Resource Strain: Low Risk  (2023)    Overall Financial Resource Strain (CARDIA)     Difficulty of Paying  Living Expenses: Not hard at all   Food Insecurity: Food Insecurity Present (2023)    Hunger Vital Sign     Worried About Running Out of Food in the Last Year: Never true     Ran Out of Food in the Last Year: Sometimes true   Transportation Needs: No Transportation Needs (2023)    PRAPARE - Transportation     Lack of Transportation (Medical): No     Lack of Transportation (Non-Medical): No   Physical Activity: Insufficiently Active (2023)    Exercise Vital Sign     Days of Exercise per Week: 3 days     Minutes of Exercise per Session: 20 min   Stress: No Stress Concern Present (2023)    [employer] - Occupational Stress Questionnaire     Feeling of Stress : Not at all   Housing Stability: Low Risk  (2023)    Housing Stability Vital Sign     Unable to Pay for Housing in the Last Year: No     Number of Places Lived in the Last Year: 1     Unstable Housing in the Last Year: No       Past Surgical History:   Procedure Laterality Date    AXILLARY HIDRADENITIS EXCISION Bilateral     BONE EXOSTOSIS EXCISION Right 2018    Procedure: EXCISION, EXOSTOSIS;  Surgeon: Jayro Pedraza Sr., MD;  Location: [facility] OR;  Service: Orthopedics;  Laterality: Right;    BREAST BIOPSY Bilateral     both benign    BREAST SURGERY  1998    CARPAL TUNNEL RELEASE      bilateral    CARPAL TUNNEL RELEASE Right 2024    Procedure: RELEASE, CARPAL TUNNEL;  Surgeon: Jaime Oswald MD;  Location: [facility] OR;  Service: Orthopedics;  Laterality: Right;    CARPAL TUNNEL RELEASE Left 2024    Procedure: RELEASE, CARPAL TUNNEL;  Surgeon: Jaime Oswald MD;  Location: [facility] OR;  Service: Orthopedics;  Laterality: Left;    CATARACT EXTRACTION Bilateral     OU     SECTION  1979    CHOLECYSTECTOMY  2014    COLONOSCOPY N/A 10/02/2020    Procedure: COLONOSCOPY;  Surgeon: Tushar Edwards MD;  Location: [facility] ENDO;  Service: Endoscopy;  Laterality: N/A;    COLONOSCOPY  W/ POLYPECTOMY  10/02/2020    Polyps x3, repeat 5 years; Tushar Edwards MD     CYST REMOVAL  07/2015    sebaceous cyst removed from face    DE QUERVAIN'S RELEASE Left 01/16/2020    Procedure: RELEASE, HAND, FOR DEQUERVAIN'S TENOSYNOVITIS;  Surgeon: Jaime Oswald MD;  Location: [facility] OR;  Service: Orthopedics;  Laterality: Left;    DE QUERVAIN'S RELEASE Right 11/20/2020    Procedure: RELEASE, HAND, FOR DEQUERVAIN'S TENOSYNOVITIS;  Surgeon: Jaime Oswald MD;  Location: [facility];  Service: Orthopedics;  Laterality: Right;    ENDOBRONCHIAL ULTRASOUND Bilateral 04/10/2024    Procedure: ENDOBRONCHIAL ULTRASOUND (EBUS);  Surgeon: Adrian Robin MD;  Location: [facility];  Service: Pulmonary;  Laterality: Bilateral;    EYE SURGERY      gastric sleeve  02/13/2017    Dr. Watson    INJECTION OF ANESTHETIC AGENT AROUND MULTIPLE INTERCOSTAL NERVES  5/10/2024    Procedure: BLOCK, NERVE, INTERCOSTAL, 2 OR MORE;  Surgeon: Mike Nuñez MD;  Location: [facility];  Service: Cardiothoracic;;    KNEE SURGERY Right     OLECRANON BURSECTOMY Right 07/25/2018    Procedure: BURSECTOMY, OLECRANON;  Surgeon: Jayro Pedraza Sr., MD;  Location: [facility];  Service: Orthopedics;  Laterality: Right;    SURGICAL REMOVAL OF BUNION WITH OSTEOTOMY OF METATARSAL BONE Left 05/10/2019    Procedure: BUNIONECTOMY, WITH METATARSAL OSTEOTOMY;  Surgeon: Srinivasan Villanueva DPM;  Location: [facility] OR;  Service: Podiatry;  Laterality: Left;    SURGICAL REMOVAL OF BUNION WITH OSTEOTOMY OF METATARSAL BONE Right 06/28/2019    Procedure: BUNIONECTOMY, WITH METATARSAL OSTEOTOMY;  Surgeon: Srinivasan Villanueva DPM;  Location: [facility] OR;  Service: Podiatry;  Laterality: Right;    SURGICAL REMOVAL OF LYMPH NODE Left 5/10/2024    Procedure: EXCISION, LYMPH NODE;  Surgeon: Mike Nuñez MD;  Location: [facility];  Service: Cardiothoracic;  Laterality: Left;    TONSILLECTOMY, ADENOIDECTOMY  1980s    TRIGGER FINGER RELEASE Right 04/01/2015    Dr. Pedraza    XI  "ROBOTIC RATS,WITH LOBECTOMY,LUNG Left 5/10/2024    Procedure: XI ROBOTIC RATS,WITH LOBECTOMY,LUNG;  Surgeon: Mike Nuñez MD;  Location: [facility];  Service: Cardiothoracic;  Laterality: Left;  Lingulectomy       Review of Systems   Constitutional:  Negative for activity change and appetite change.   HENT:  Negative for dental problem, nosebleeds and sore throat.    Eyes:  Negative for discharge and visual disturbance.   Respiratory:  Positive for chest tightness and shortness of breath. Negative for cough and stridor.    Cardiovascular:  Negative for leg swelling.   Gastrointestinal:  Negative for abdominal distention and abdominal pain.   Genitourinary:  Negative for difficulty urinating and dysuria.   Musculoskeletal:  Negative for arthralgias, back pain and joint swelling.   Allergic/Immunologic: Negative for environmental allergies.   Neurological:  Negative for dizziness, syncope and headaches.   Hematological:  Does not bruise/bleed easily.   Psychiatric/Behavioral:  Negative for behavioral problems.           Objective:   /70 (BP Location: Right arm, Patient Position: Lying)   Pulse 104   Temp 99.3 °F (37.4 °C) (Oral)   Resp 20   Ht 5' 3" (1.6 m)   Wt 84.1 kg (185 lb 6.5 oz)   SpO2 (!) 85%   Breastfeeding No   BMI 32.84 kg/m²     X-Ray Chest 1 View  Narrative: EXAMINATION:  XR CHEST 1 VIEW    CLINICAL HISTORY:  SOB, congestion, low o2 sats;    TECHNIQUE:  Single frontal view of the chest was performed.    COMPARISON:  05/11/2024    FINDINGS:  Left-sided chest tube is no longer visualized.  No definitive or detectable pneumothorax.  Moderate left lower lung and right mid lung airspace disease and/or atelectasis.  Normal heart size.  Impression: As above.    Electronically signed by: Tres Nicole  Date:    05/13/2024  Time:    00:42         Physical Exam  Vitals reviewed.   Constitutional:       Appearance: Normal appearance.   HENT:      Head: Normocephalic and atraumatic.      " Mouth/Throat:      Mouth: Mucous membranes are moist.   Eyes:      Extraocular Movements: Extraocular movements intact.   Cardiovascular:      Rate and Rhythm: Normal rate and regular rhythm.      Pulses: Normal pulses.      Heart sounds: Normal heart sounds.      Comments: Chest tube in place draining serosanguineous liquid no air leak  Pulmonary:      Effort: Pulmonary effort is normal.      Breath sounds: Rhonchi and rales present.   Abdominal:      Palpations: Abdomen is soft.   Musculoskeletal:         General: Normal range of motion.      Cervical back: Normal range of motion and neck supple.   Skin:     General: Skin is warm.      Capillary Refill: Capillary refill takes less than 2 seconds.   Neurological:      General: No focal deficit present.      Mental Status: She is alert and oriented to person, place, and time.   Psychiatric:         Mood and Affect: Mood normal.     Chest x-ray shows bilateral atelectasis postsurgical changes.    Assessment:     1. Solitary pulmonary nodule    2. Chronic bronchitis, unspecified chronic bronchitis type    3. Malignant neoplasm of lower lobe of left lung    4. Bilateral carpal tunnel syndrome    5. Hilar adenopathy    6. Tachycardia    Obesity  Hypertension  Hyperlipidemia  Depression  Sleep apnea      Plan   Postop day 5 status post rats left-sided lingulectomy and mediastinal lymph node dissection for a adenocarcinoma.  Neuro Pain control as needed on oxycodone p.r.n.  Cardio vascular  beta-blocker  The patient had atrial flutter flutter cardiology consulted for cardioversion.  Respiratory aggressive pulmonary toilet nebulizers out of bed ambulate incentive spirometry Pain con  PTOT out of bed ambulate.  GI regular diet  DVT prophylaxis on heparin drip for atrial fibrillation  GI prophylaxis on esomeprazole  Restarted her home meds  Planning discharge today.              Mike Nuñez MD  [facility]  [place]

## 2024-05-15 NOTE — OP NOTE
[facility] - Telemetry ([facility])  General Surgery  Operative Note    SUMMARY     Date of Procedure: 5/15/2024     Procedure: Procedure(s) (LRB):  Transesophageal echo (LAKESHIA) intra-procedure log documentation (N/A)       Surgeons and Role:     * Twan Pelaez MD - Primary    Assisting Surgeon: None    Pre-Operative Diagnosis: Atrial fibrillation, unspecified type [I48.91]    Post-Operative Diagnosis: Post-Op Diagnosis Codes:     * Atrial fibrillation, unspecified type [I48.91]    Anesthesia: Monitor Anesthesia Care    Operative Findings (including complications, if any):     Procedure Description: The risks, benefits, and alternatives of the procedure expalined in detail with patient and family. The patient voices understanding and all questions have been addressed. The patient agrees to proceed as planned.   The patient was sedated by anesthesiology service. The probe was advanced via throat into esophagus smoothly. The patient tolerated the procedure well and there was no complications. The probed was withdrawal at the end of study. The patient was hemodynamically stable.   Estimated Blood Loss: none.   Findings / Operative Note:   No CARA thrombus visualized.   After LAKESHIA, DCCV x1 with 150 joules and AFLutter was converted to SR    The primary service service was notified.     Ok to transfer to the floor once hemodynamically stable.        Estimated Blood Loss (EBL): * No values recorded between 5/15/2024 11:57 AM and 5/15/2024 12:14 PM *           Implants: * No implants in log *    Specimens:   Specimen (24h ago, onward)       Start     Ordered    Signed and Held  Specimen to Pathology, Surgery Cardiovascular  Once        Comments: Pre-op Diagnosis: Malignant neoplasm of lower lobe of left lung [C34.32]Procedure(s):XI ROBOTIC RATS,WITH LOBECTOMY,LUNGEXCISION, LYMPH NODEBLOCK, NERVE, INTERCOSTAL, 2 OR MORE Number of specimens: 6Name of specimens: 1) Hilar lymph node-frozen section/perm.; 2) Level 9 lymph node-perm.; 3)  Level 6 lymph node-perm.; 4) Level 5 lymph node-perm.; 5) Level 4 L lymph node-perm.; 6) Left Upper Lobe-perm.     References:    Click here for ordering Quick Tip   Question Answer Comment   Procedure Type: Cardiovascular    Specimen Class: Routine/Screening    Release to patient Immediate        Signed and Held                            Condition: Good    Disposition: PACU - hemodynamically stable.    Attestation: I was present and scrubbed for the entire procedure.

## 2024-05-15 NOTE — ASSESSMENT & PLAN NOTE
- HR remains 130-140 with some noted atrial flutter for which cards has been consulted, plans for cardioversion today   - echo from 5/1 with EF 55% and normal diastolic function    - continue telemetry monitoring

## 2024-05-15 NOTE — PLAN OF CARE
Pt awakened after procedure and remains AAOX3 at time of transfer.  Transferred back to room 207, via hospital bed, in no apparent distress.   Tele monitor placed with confirmation of SR with heart rate in 90s.  Report given to BEAU Zazueta; no further questions at this time.

## 2024-05-15 NOTE — ASSESSMENT & PLAN NOTE
- admitted 5/10 per CTS for planned lingulectomy and external lymph node dissection  - continue PT/OT, ambulation, and IS use  - doing well on RA

## 2024-05-15 NOTE — PROGRESS NOTES
[facility] - Telemetry ([facility])  Pulmonology  Progress Note    Patient Name: Cassandra Campos  MRN: [id number]  Admission Date: 5/10/2024  Hospital Length of Stay: 5 days  Code Status: Full Code  Attending Provider: Mike Nuñez MD  Primary Care Provider: Emil Zhang MD   Principal Problem: Malignant neoplasm of lower lobe of left lung    Subjective:     73-year-old female with a known past medical history of hypertension, diabetes, hyperlipidemia, asthma, previous tobacco abuse (quit 20 years ago), and obesity that recently underwent biopsy of left upper lobe lung mass which was consistent with adenocarcinoma as well as a PET scan consistent with increased metabolic activity in that site as well as lymph node interaction that was admitted to the hospital 5/10 for planned lingulectomy and external lymph node dissection with Dr. Nuñez.  Patient with routine postop care with chest tube removal as expected.  Early in the morning of 5/13 patient requiring non-rebreather and was noted to have elevated heart rate.  On the afternoon of 5/13, pulmonary consulted for further evaluation given ongoing higher O2 requirements.    At the time of my exam, patient is awake and alert in no acute distress on 8L nasal cannula.  Multiple family members at bedside.    Interval History:  5/14: no acute events overnight, has been weaned to RA with sat 94-97%, HR remains elevated with some atrial flutter for which cards has been consulted  5/15: OOB to chair on RA this AM just after working with PT this AM, reports she walked the halls with no issues, plans for cardioversion today, eager to go home     ROS complete and negative unless stated in the interval HPI     Objective:     Vital Signs (Most Recent):  Temp: 98.1 °F (36.7 °C) (05/15/24 0833)  Pulse: (!) 134 (05/15/24 0833)  Resp: 20 (05/15/24 0833)  BP: 116/65 (05/15/24 0833)  SpO2: (!) 94 % (05/15/24 0833) Vital Signs (24h Range):  Temp:  [98.1 °F (36.7 °C)-99.4 °F (37.4  "°C)] 98.1 °F (36.7 °C)  Pulse:  [104-148] 134  Resp:  [16-22] 20  SpO2:  [81 %-100 %] 94 %  BP: ()/(65-82) 116/65     Weight: 84.1 kg (185 lb 6.5 oz)  Body mass index is 32.84 kg/m².      Intake/Output Summary (Last 24 hours) at 5/15/2024 1046  Last data filed at 5/15/2024 0627  Gross per 24 hour   Intake 892.95 ml   Output --   Net 892.95 ml        Physical Exam  Vitals reviewed.   Constitutional:       General: She is awake. She is not in acute distress.     Appearance: She is obese. She is not ill-appearing.   Pulmonary:      Effort: Pulmonary effort is normal.      Breath sounds: Normal breath sounds.      Comments: On RA  Neurological:      Mental Status: She is alert.   Psychiatric:         Behavior: Behavior is cooperative.             Vents:  Oxygen Concentration (%): 32 (05/14/24 0731)    Lines/Drains/Airways       Peripheral Intravenous Line  Duration                  Peripheral IV - Single Lumen 05/14/24 1227 22 G Posterior;Right Forearm <1 day         Peripheral IV - Single Lumen 05/14/24 1228 22 G Left;Posterior Forearm <1 day                    Significant Labs:    CBC/Anemia Profile:  Recent Labs   Lab 05/14/24  0501 05/14/24  1049 05/15/24  0309   WBC 8.09 8.21 6.59  6.59   HGB 11.6* 11.7* 11.6*  11.6*   HCT 34.5* 35.0* 33.6*  33.6*    281 279  279   MCV 75* 75* 75*  75*   RDW 13.5 13.7 13.7  13.7        Chemistries:  Recent Labs   Lab 05/15/24  0309      K 3.5      CO2 26   BUN 7*   CREATININE 0.6   CALCIUM 9.2       All pertinent labs within the past 24 hours have been reviewed.    Significant Imaging:  I have reviewed all pertinent imaging results/findings within the past 24 hours.    ABG  No results for input(s): "PH", "PO2", "PCO2", "HCO3", "BE" in the last 168 hours.  Assessment/Plan:     Cardiac/Vascular  Typical atrial flutter  - see plan for SVT    Paroxysmal SVT (supraventricular tachycardia)  - HR remains 130-140 with some noted atrial flutter for which cards " has been consulted, plans for cardioversion today   - echo from 5/1 with EF 55% and normal diastolic function    - continue telemetry monitoring     Oncology  * Malignant neoplasm of lower lobe of left lung  - admitted 5/10 per CTS for planned lingulectomy and external lymph node dissection  - continue PT/OT, ambulation, and IS use  - doing well on RA    Endocrine  Obesity  Body mass index is 32.84 kg/m². Morbid obesity complicates all aspects of disease management from diagnostic modalities to treatment. Weight loss encouraged and health benefits explained to patient.   - pt would benefit greatly from weight loss and lifestyle modifications        Bud Jones NP  Pulmonology  [facility] - Telemetry ([facility])

## 2024-05-15 NOTE — SUBJECTIVE & OBJECTIVE
73-year-old female with a known past medical history of hypertension, diabetes, hyperlipidemia, asthma, previous tobacco abuse (quit 20 years ago), and obesity that recently underwent biopsy of left upper lobe lung mass which was consistent with adenocarcinoma as well as a PET scan consistent with increased metabolic activity in that site as well as lymph node interaction that was admitted to the hospital 5/10 for planned lingulectomy and external lymph node dissection with Dr. Nuñez.  Patient with routine postop care with chest tube removal as expected.  Early in the morning of 5/13 patient requiring non-rebreather and was noted to have elevated heart rate.  On the afternoon of 5/13, pulmonary consulted for further evaluation given ongoing higher O2 requirements.    At the time of my exam, patient is awake and alert in no acute distress on 8L nasal cannula.  Multiple family members at bedside.    Interval History:  5/14: no acute events overnight, has been weaned to RA with sat 94-97%, HR remains elevated with some atrial flutter for which cards has been consulted  5/15: OOB to chair on RA this AM just after working with PT this AM, reports she walked the halls with no issues, plans for cardioversion today, eager to go home     ROS complete and negative unless stated in the interval HPI     Objective:     Vital Signs (Most Recent):  Temp: 98.1 °F (36.7 °C) (05/15/24 0833)  Pulse: (!) 134 (05/15/24 0833)  Resp: 20 (05/15/24 0833)  BP: 116/65 (05/15/24 0833)  SpO2: (!) 94 % (05/15/24 0833) Vital Signs (24h Range):  Temp:  [98.1 °F (36.7 °C)-99.4 °F (37.4 °C)] 98.1 °F (36.7 °C)  Pulse:  [104-148] 134  Resp:  [16-22] 20  SpO2:  [81 %-100 %] 94 %  BP: ()/(65-82) 116/65     Weight: 84.1 kg (185 lb 6.5 oz)  Body mass index is 32.84 kg/m².      Intake/Output Summary (Last 24 hours) at 5/15/2024 1046  Last data filed at 5/15/2024 0627  Gross per 24 hour   Intake 892.95 ml   Output --   Net 892.95 ml        Physical  Exam  Vitals reviewed.   Constitutional:       General: She is awake. She is not in acute distress.     Appearance: She is obese. She is not ill-appearing.   Pulmonary:      Effort: Pulmonary effort is normal.      Breath sounds: Normal breath sounds.      Comments: On RA  Neurological:      Mental Status: She is alert.   Psychiatric:         Behavior: Behavior is cooperative.             Vents:  Oxygen Concentration (%): 32 (05/14/24 0731)    Lines/Drains/Airways       Peripheral Intravenous Line  Duration                  Peripheral IV - Single Lumen 05/14/24 1227 22 G Posterior;Right Forearm <1 day         Peripheral IV - Single Lumen 05/14/24 1228 22 G Left;Posterior Forearm <1 day                    Significant Labs:    CBC/Anemia Profile:  Recent Labs   Lab 05/14/24  0501 05/14/24  1049 05/15/24  0309   WBC 8.09 8.21 6.59  6.59   HGB 11.6* 11.7* 11.6*  11.6*   HCT 34.5* 35.0* 33.6*  33.6*    281 279  279   MCV 75* 75* 75*  75*   RDW 13.5 13.7 13.7  13.7        Chemistries:  Recent Labs   Lab 05/15/24  0309      K 3.5      CO2 26   BUN 7*   CREATININE 0.6   CALCIUM 9.2       All pertinent labs within the past 24 hours have been reviewed.    Significant Imaging:  I have reviewed all pertinent imaging results/findings within the past 24 hours.

## 2024-05-15 NOTE — PLAN OF CARE
Tolerated session well. Decreased dyspnea on exertion. Recommending low intensity intervention at d/c.

## 2024-05-15 NOTE — PT/OT/SLP PROGRESS
"Occupational Therapy   Treatment    Name: Cassandra Campos  MRN: [id number]  Admitting Diagnosis:  Malignant neoplasm of lower lobe of left lung  Day of Surgery    Recommendations:     Discharge Recommendations: Low Intensity Therapy (24/7 SPV and A)  Discharge Equipment Recommendations:  none  Barriers to discharge:  None    Assessment:     Cassandra Campos is a 73 y.o. female with a medical diagnosis of Malignant neoplasm of lower lobe of left lung.  She presents with the following performance deficits affecting function are weakness, impaired endurance, impaired self care skills, impaired functional mobility, impaired balance, impaired cardiopulmonary response to activity.     Rehab Prognosis:  Good; patient would benefit from acute skilled OT services to address these deficits and reach maximum level of function.       Patient tolerated session well. Improvements in activity tolerance and dynamic standing balance. HR elevated (110s) with exertion, but improved from previous date. Decreased dyspnea noted.    Plan:     Patient to be seen 2 x/week to address the above listed problems via self-care/home management, therapeutic activities, therapeutic exercises  Plan of Care Expires: 05/28/24  Plan of Care Reviewed with: patient    Subjective     Chief Complaint: Reported "I am so ready to go home."  Patient/Family Comments/goals: increase independence  Pain/Comfort:  Pain Rating 1: 0/10    Objective:     Communicated with: Nurse, Carlita, prior to session.  Patient found supine with peripheral IV, telemetry, pulse ox (continuous) (rashel sys) upon OT entry to room.    General Precautions: Standard, fall    Orthopedic Precautions:N/A  Braces: N/A  Respiratory Status: Room air     Occupational Performance:     Bed Mobility:    Patient completed Supine to Sit with stand by assistance     Functional Mobility/Transfers:  Patient completed Sit <> Stand Transfer with stand by assistance  with  rolling walker   Patient " completed Bed <> Chair Transfer using Step Transfer technique with stand by assistance with rolling walker  Functional Mobility: Patient completed x60ft x2 trials functional mobility with RW and SBA to increase dynamic standing balance and activity tolerance needed for ADL completion.  Provided with v/c for technique with transfers to increase safety and independence with completion  Energy conservation/activity pacing cueing throughout, with education on functional translation in home environment.    [facility] 6 Click ADL: 20    Treatment & Education:  Encouraged completion of B UE AROM therex throughout the day to increase functional strength and activity tolerance needed for ADL completion. Return demonstration x8 reps, x3 planes of motion with v/c for pacing. Educated on benefits of OOB activity and importance of calling for A to transfer back to bed. Patient stated understanding and in agreement with POC.    Patient left up in chair with all lines intact, call button in reach, and chair alarm on    GOALS:   Multidisciplinary Problems       Occupational Therapy Goals          Problem: Occupational Therapy    Goal Priority Disciplines Outcome Interventions   Occupational Therapy Goal     OT, PT/OT Progressing    Description: Goals to be met by: 5/28/24     Patient will increase functional independence with ADLs by performing:    Toileting from toilet with Supervision for hygiene and clothing management.   Toilet transfer to toilet with Supervision.  Increased functional strength in B UE grossly by 1/2 MM grade.                         Time Tracking:     OT Date of Treatment: 05/15/24  OT Start Time: 0930  OT Stop Time: 0955  OT Total Time (min): 25 min    Billable Minutes:Therapeutic Activity 25    Carlita Santiago OT  5/15/2024

## 2024-05-15 NOTE — TRANSFER OF CARE
"Anesthesia Transfer of Care Note    Patient: Cassandra Campos    Procedure(s) Performed: Procedure(s) (LRB):  Transesophageal echo (LAKESHIA) intra-procedure log documentation (N/A)    Patient location: Other: CVRU    Anesthesia Type: MAC    Transport from OR: Transported from OR on 2-3 L/min O2 by NC with adequate spontaneous ventilation    Post pain: adequate analgesia    Post assessment: no apparent anesthetic complications    Post vital signs: stable    Level of consciousness: responds to stimulation    Nausea/Vomiting: no nausea/vomiting    Complications: none    Transfer of care protocol was followed      Last vitals: Visit Vitals  /65   Pulse (!) 134   Temp 36.7 °C (98.1 °F)   Resp 20   Ht 5' 3" (1.6 m)   Wt 84.1 kg (185 lb 6.5 oz)   SpO2 (!) 94%   Breastfeeding No   BMI 32.84 kg/m²     "

## 2024-05-15 NOTE — DISCHARGE INSTRUCTIONS
POST OP LAKESHIA AND CARDIOVERSION INSTRUCTIONS:     ACTIVITY LEVEL  You may feel sleepy for several hours.  Rest until you are more awake.  Gradually resume your normal activities over the next 24 hours.   For the next 8 hours, you should be watched by a responsible adult. This person should make sure your condition is not getting worse.   Don't drink any alcohol for the next 24 hours.  Don't drive, operate dangerous machinery, or make important business or personal decisions during the next 24 hours.  Your healthcare provider may tell you not to take any medicine by mouth for pain or sleep in the next 4 hours. These medicines may react with the medicines you were given in the hospital. This could cause a much stronger response than usual.     DIET  At home, begin with liquids and then progress to normal foods if you don't experience nausea.    MEDICATIONS  Continue home medications as before procedure.  You may take Tylenol every four hours as needed for minor discomfort at pad sites or chest soreness.  If you take Coumadin, resume your regimen and continue to have your blood tested weekly as directed by your physician.    FOLLOW UP  You will be scheduled for a follow up appointment and EKG within two weeks of your procedure.            CALL YOUR HEALTHCARE PROVIDER IF YOU START TO HAVE THE FOLLOWING SYMPTOMS:        1.  Drowsiness that doesn't get better       2. Weakness or dizziness that doesn't get better            3. Repeated vomiting

## 2024-05-15 NOTE — PT/OT/SLP PROGRESS
Physical Therapy Treatment    Patient Name:  Cassandra Campos   MRN:  [id number]    Recommendations:     Discharge Recommendations: Low Intensity Therapy (WITH 24 HOUR CARE FOR SAFETY)  Discharge Equipment Recommendations: none  Barriers to discharge: None    Assessment:     Cassandra Campos is a 73 y.o. female admitted with a medical diagnosis of Malignant neoplasm of lower lobe of left lung.  She presents with the following impairments/functional limitations: weakness, impaired endurance, impaired functional mobility, gait instability, impaired balance, decreased coordination.    Rehab Prognosis: Good; patient would benefit from acute skilled PT services to address these deficits and reach maximum level of function.    Recent Surgery: Procedure(s) (LRB):  Transesophageal echo (LAKESHIA) intra-procedure log documentation (N/A) Day of Surgery    Plan:     During this hospitalization, patient to be seen 3 x/week to address the identified rehab impairments via gait training, therapeutic activities, therapeutic exercises and progress toward the following goals:    Plan of Care Expires:  05/25/24    Subjective     Chief Complaint: READY TO GO HOME  Patient/Family Comments/goals:   Pain/Comfort:  Pain Rating 1: 0/10      Objective:     Communicated with NURSE SANTILLAN prior to session.  Patient found supine with telemetry, pulse ox (continuous), peripheral IV upon PT entry to room.     General Precautions: Standard, fall  Orthopedic Precautions: N/A  Braces: N/A  Respiratory Status: Room air     Functional Mobility:  Bed Mobility:     Rolling Left:  stand by assistance  Scooting: stand by assistance  Supine to Sit: stand by assistance  Transfers:     Sit to Stand:  stand by assistance with rolling walker  Bed to Chair: stand by assistance with  rolling walker  using  Step Transfer  Gait: PT AMB 60' X 2 TRIALS WITH RW AND SBA, CUES FOR UPRIGHT POSTURE AND RW SAFETY, NO LOB OR SOB ON ROOM AIR, 91% ON ROOM AIR  Balance: GOOD  "SITTING BALANCE, FAIR+ DYNAMIC BALANCE DURING GAIT  PT EDUCATED IN BLE THEREX TO PERFORM WHILE SEATED IN CHAIR: HIP FLEX/EXT, LAQ, AP'S    AM-PAC 6 CLICK MOBILITY  Turning over in bed (including adjusting bedclothes, sheets and blankets)?: 4  Sitting down on and standing up from a chair with arms (e.g., wheelchair, bedside commode, etc.): 4  Moving from lying on back to sitting on the side of the bed?: 4  Moving to and from a bed to a chair (including a wheelchair)?: 4  Need to walk in hospital room?: 4  Climbing 3-5 steps with a railing?: 1  Basic Mobility Total Score: 21     Treatment & Education:  PT EDUCATION:  - ROLE OF P.T. AND POC IN ACUTE CARE HOSPITAL SETTING  - RW USE AND SAFETY DURING TF'S AND GAIT  - ENCOURAGED TO INCREASE TIME OOB IN CHAIR TO TOLERANCE   - TO CONTINUE THERAPUETIC EXERCISES THROUGHOUT THE DAY TO INCREASE ACTIVITY TOLERANCE AND DECREASE RISK FOR PNEUMONIA AND BLOOD CLOTS: HIP FLEX/EXT, HIP ABD/ADD, QUAD SET, HEEL SLIDE, AP  - RISK FOR FALLS DUE TO GENERALIZED WEAKNESS, EDUCATED ON "CALL DON'T FALL", ENCOURAGED TO CALL FOR ASSISTANCE WITH ALL NEEDS SUCH AS BED<>CHAIR TRANSFERS OR TRIPS TO BATHROOM, PT AGREEABLE TO SAFETY PRECAUTIONS    Patient left up in chair with all lines intact, call button in reach, chair alarm on, and NURSE notified..    GOALS:   Multidisciplinary Problems       Physical Therapy Goals          Problem: Physical Therapy    Goal Priority Disciplines Outcome Goal Variances Interventions   Physical Therapy Goal     PT, PT/OT Progressing     Description: LTG'S TO BE MET IN 14 DAYS (5/25/24)  PT WILL REQUIRE Mod I FOR BED MOBILITY  PT WILL REQUIRE Mod I FOR BED<>CHAIR TF'S  PT WILL  FEET WITH RW AND SBA  PT WILL INC AMPAC SCORE BY 2 POINTS TO PROGRESS GROSS FUNC MOBILITY                         Time Tracking:     PT Received On: 05/15/24  PT Start Time: 0935     PT Stop Time: 1005  PT Total Time (min): 30 min     Billable Minutes: Gait Training 15 and Therapeutic " Activity 15    Treatment Type: Evaluation, Treatment  PT/PTA: PT     Number of PTA visits since last PT visit: 0     05/15/2024

## 2024-05-15 NOTE — DISCHARGE SUMMARY
[facility] - Telemetry (Hospital)  Discharge Note  HPI:  73-year-old female who has a ex-smoker found to have a left upper lobe pulmonary nodule had a biopsy which showed adenocarcinoma.  Patient has history of hypertension type 2 diabetes mellitus and hyperlipidemia.  No history of cough no history of hemoptysis at this time.  She had a full workup including a PET scan which showed left upper lobe lesion which has high activity and a mediastinal lymph node.  She underwent biopsy for mediastinal lymph node with an EBUS which came back as negative for malignancy.  Date of Procedure: 5/10/2024      Procedure: Procedure(s) (LRB):  XI ROBOTIC RATS,WITH LOBECTOMY,LUNG (Left)  EXCISION, LYMPH NODE (Left)  BLOCK, NERVE, INTERCOSTAL, 2 OR MORE  Lingulectomy   The external lymph node dissection     Surgeons and Role:     * Mike Nuñez MD - Primary     Assisting Surgeon: None     Pre-Operative Diagnosis: Malignant neoplasm of upper lobe of left lung [C34.32]  Adenocarcinoma of the left upper lobe  Hypertension  Hyperlipidemia  Obesity  Sleep apnea  COPD  Type 2 diabetes mellitus  Depression     Post-Operative Diagnosis:   Same     Pain overnight chest tube draining serosanguineous drainage no air leak chest tube to water seal this morning incentive spirometry less than 500 mostly splinting from the pain.     05/12/2024 the patient had urinary retention overnight for which the [place] was reintroduced she is still having pain issues chest tube draining minimal fluid.  Issues with ambulation     05/13/2024 patient had a episode of desaturate last night for which she was put on non-rebreather chest x-ray showed no evidence of a pneumothorax was stable in the morning still requiring high oxygen incentive spirometry 500.     05/14/2024 the patient pain issues were better ambulating her heart drain was in the 140s atrial flutter.     05/15/2024 the patient is still in atrial flutter waiting for cardioversion today heart rate is in  the 110s otherwise hemodynamically stable off oxygen ambulating pain well controlled     Patient was cardioverted successfully to sinus rhythm by Dr. Bell.  She remained in sinus rhythm.  Patient was discharged home with beta-blocker and Eliquis and followed up with cardiology and electrophysiology.  She will see me as an outpatient.    Pain medication as needed      OUTCOME: Patient tolerated treatment/procedure well without complication and is now ready for discharge.    DISPOSITION: Home or Self Care    FINAL DIAGNOSIS:  Malignant neoplasm of lower lobe of left lung    FOLLOWUP: In clinic    DISCHARGE INSTRUCTIONS:    Discharge Procedure Orders   Ambulatory referral/consult to Home Health   Standing Status: Future   Referral Priority: Routine Referral Type: Home Health   Referral Reason: Specialty Services Required   Requested Specialty: Home Health Services   Number of Visits Requested: 1     Ambulatory referral/consult to Cardiology   Standing Status: Future   Referral Priority: Routine Referral Type: Consultation   Referral Reason: Specialty Services Required   Requested Specialty: Cardiology   Number of Visits Requested: 1     Diet Cardiac     No dressing needed     Activity as tolerated        TIME SPENT ON DISCHARGE: 25 minutes

## 2024-05-17 LAB
COMMENT: NORMAL
FINAL PATHOLOGIC DIAGNOSIS: NORMAL
FROZEN SECTION DIAGNOSIS: NORMAL
FROZEN SECTION FOOTNOTE: NORMAL
GROSS: NORMAL
Lab: NORMAL
MICROSCOPIC EXAM: NORMAL
OHS QRS DURATION: 100 MS
OHS QTC CALCULATION: 467 MS

## 2024-05-20 ENCOUNTER — TELEPHONE (OUTPATIENT)
Dept: CARDIOLOGY | Facility: CLINIC | Age: 74
End: 2024-05-20
Payer: MEDICARE

## 2024-05-20 DIAGNOSIS — C34.92 ADENOCARCINOMA OF LEFT LUNG: Primary | ICD-10-CM

## 2024-05-20 NOTE — TELEPHONE ENCOUNTER
Hello,      The prior authorization for Cassandradarby Campos's LIODERM prescription has been APPROVED FROM 5/20/24 TO 12/31/24 with copayment of $10.       [facility] will reach out to patient for further correspondence.     If there are any additional questions or concerns, please contact me.     Lance Pal [facility]   Patient Advocate, Prior Authorization Department   [facility] [place]   Phone [phone]

## 2024-05-22 NOTE — ANESTHESIA POSTPROCEDURE EVALUATION
Anesthesia Post Evaluation    Patient: Cassandra Campos    Procedure(s) Performed: Procedure(s) (LRB):  XI ROBOTIC RATS,WITH LOBECTOMY,LUNG (Left)  EXCISION, LYMPH NODE (Left)  BLOCK, NERVE, INTERCOSTAL, 2 OR MORE    Final Anesthesia Type: general      Patient location during evaluation: PACU  Patient participation: Yes- Able to Participate  Level of consciousness: awake and alert and oriented  Post-procedure vital signs: reviewed and stable  Pain management: adequate  Airway patency: patent  DANIEL mitigation strategies: Verification of full reversal of neuromuscular block  PONV status at discharge: No PONV  Anesthetic complications: no      Cardiovascular status: blood pressure returned to baseline and hemodynamically stable  Respiratory status: unassisted  Hydration status: euvolemic  Follow-up not needed.              Vitals Value Taken Time   /60 05/15/24 1247   Temp 36.7 °C (98.1 °F) 05/15/24 0833   Pulse 90 05/15/24 1251   Resp 18 05/15/24 1251   SpO2 94 % 05/15/24 1251   Vitals shown include unfiled device data.      Event Time   Out of Recovery 19:42:00         Pain/Francheska Score: No data recorded

## 2024-05-23 ENCOUNTER — OFFICE VISIT (OUTPATIENT)
Dept: CARDIOTHORACIC SURGERY | Facility: CLINIC | Age: 74
End: 2024-05-23
Payer: MEDICARE

## 2024-05-23 VITALS
DIASTOLIC BLOOD PRESSURE: 78 MMHG | WEIGHT: 165.38 LBS | SYSTOLIC BLOOD PRESSURE: 108 MMHG | HEART RATE: 100 BPM | BODY MASS INDEX: 29.29 KG/M2 | OXYGEN SATURATION: 95 %

## 2024-05-23 DIAGNOSIS — E11.9 CONTROLLED TYPE 2 DIABETES MELLITUS WITHOUT COMPLICATION, WITHOUT LONG-TERM CURRENT USE OF INSULIN: ICD-10-CM

## 2024-05-23 DIAGNOSIS — R91.8 HILAR MASS: ICD-10-CM

## 2024-05-23 DIAGNOSIS — R91.1 SOLITARY PULMONARY NODULE: Primary | ICD-10-CM

## 2024-05-23 DIAGNOSIS — E11.69 HYPERLIPIDEMIA ASSOCIATED WITH TYPE 2 DIABETES MELLITUS: ICD-10-CM

## 2024-05-23 DIAGNOSIS — I47.10 PAROXYSMAL SVT (SUPRAVENTRICULAR TACHYCARDIA): ICD-10-CM

## 2024-05-23 DIAGNOSIS — J84.10 CALCIFIED GRANULOMA OF LUNG: ICD-10-CM

## 2024-05-23 DIAGNOSIS — E66.01 CLASS 2 SEVERE OBESITY DUE TO EXCESS CALORIES WITH SERIOUS COMORBIDITY IN ADULT, UNSPECIFIED BMI: ICD-10-CM

## 2024-05-23 DIAGNOSIS — I10 PRIMARY HYPERTENSION: ICD-10-CM

## 2024-05-23 DIAGNOSIS — E78.5 HYPERLIPIDEMIA ASSOCIATED WITH TYPE 2 DIABETES MELLITUS: ICD-10-CM

## 2024-05-23 DIAGNOSIS — C34.32 MALIGNANT NEOPLASM OF LOWER LOBE OF LEFT LUNG: ICD-10-CM

## 2024-05-23 PROCEDURE — 3044F HG A1C LEVEL LT 7.0%: CPT | Mod: CPTII,S$GLB,, | Performed by: THORACIC SURGERY (CARDIOTHORACIC VASCULAR SURGERY)

## 2024-05-23 PROCEDURE — 4010F ACE/ARB THERAPY RXD/TAKEN: CPT | Mod: CPTII,S$GLB,, | Performed by: THORACIC SURGERY (CARDIOTHORACIC VASCULAR SURGERY)

## 2024-05-23 PROCEDURE — 3074F SYST BP LT 130 MM HG: CPT | Mod: CPTII,S$GLB,, | Performed by: THORACIC SURGERY (CARDIOTHORACIC VASCULAR SURGERY)

## 2024-05-23 PROCEDURE — 99999 PR PBB SHADOW E&M-EST. PATIENT-LVL IV: CPT | Mod: PBBFAC,,, | Performed by: THORACIC SURGERY (CARDIOTHORACIC VASCULAR SURGERY)

## 2024-05-23 PROCEDURE — 1126F AMNT PAIN NOTED NONE PRSNT: CPT | Mod: CPTII,S$GLB,, | Performed by: THORACIC SURGERY (CARDIOTHORACIC VASCULAR SURGERY)

## 2024-05-23 PROCEDURE — 3078F DIAST BP <80 MM HG: CPT | Mod: CPTII,S$GLB,, | Performed by: THORACIC SURGERY (CARDIOTHORACIC VASCULAR SURGERY)

## 2024-05-23 PROCEDURE — 99024 POSTOP FOLLOW-UP VISIT: CPT | Mod: S$GLB,,, | Performed by: THORACIC SURGERY (CARDIOTHORACIC VASCULAR SURGERY)

## 2024-05-23 PROCEDURE — 1159F MED LIST DOCD IN RCRD: CPT | Mod: CPTII,S$GLB,, | Performed by: THORACIC SURGERY (CARDIOTHORACIC VASCULAR SURGERY)

## 2024-05-23 PROCEDURE — 1101F PT FALLS ASSESS-DOCD LE1/YR: CPT | Mod: CPTII,S$GLB,, | Performed by: THORACIC SURGERY (CARDIOTHORACIC VASCULAR SURGERY)

## 2024-05-23 PROCEDURE — 3288F FALL RISK ASSESSMENT DOCD: CPT | Mod: CPTII,S$GLB,, | Performed by: THORACIC SURGERY (CARDIOTHORACIC VASCULAR SURGERY)

## 2024-05-23 NOTE — PROGRESS NOTES
Subjective:      Patient ID: Cassandra Campos is a 73 y.o. female.    Chief Complaint: No chief complaint on file.    HPI:  73-year-old female status post left robotic lingual lobectomy and mediastinal lymph node dissection for a high-grade adenocarcinoma is here for the 1st follow-up visit occasional pain over the chest tube insertion site otherwise patient is back to be her baseline in activities. No fever no cough.    Review of patient's allergies indicates:  No Known Allergies    Review of Systems   Constitutional:  Negative for activity change and appetite change.   HENT:  Negative for dental problem, nosebleeds and sore throat.    Eyes:  Negative for discharge and visual disturbance.   Respiratory:  Negative for cough, chest tightness and stridor.    Cardiovascular:  Positive for chest pain. Negative for leg swelling.   Gastrointestinal:  Negative for abdominal distention and abdominal pain.   Genitourinary:  Negative for difficulty urinating and dysuria.   Musculoskeletal:  Negative for arthralgias, back pain and joint swelling.   Allergic/Immunologic: Negative for environmental allergies.   Neurological:  Negative for dizziness, syncope and headaches.   Hematological:  Does not bruise/bleed easily.   Psychiatric/Behavioral:  Negative for behavioral problems.    All other systems reviewed and are negative.         Objective:   /78   Pulse 100   Wt 75 kg (165 lb 5.5 oz)   SpO2 95%   BMI 29.29 kg/m²     Transesophageal echo (LAKESHIA) with possible cardioversion    Left Ventricle: The left ventricle is normal in size. Normal wall   thickness. There is normal systolic function with a visually estimated   ejection fraction of 55 - 60%. There is normal diastolic function.    Right Ventricle: Normal right ventricular cavity size. Systolic   function is normal.    Left Atrium: The left atrial appendage appears normal. The left atrial   appendage has a windsock morphology. Appendage velocity is normal at    greater than 40 cm/sec. There is no thrombus in the left atrial appendage.    IVC/SVC: Normal venous pressure at 3 mmHg.    The pre-cardioversion rhythm was atrial flutter. A 150 J synchronized   cardioversion was successfully performed with restoration of normal sinus   rhythm. The post-cardioversion rhythm was normal sinus rhythm. Post   cardioversion heart rate was 68 BPM.  Intra-Procedure Documentation  LAKESHIA performed in the Invasive Lab    - See Procedure Log link below for nursing documentation    - See LAKESHIA order on Card Proc Tab for physician findings          Physical Exam  Vitals reviewed.   Constitutional:       Appearance: Normal appearance.   HENT:      Head: Normocephalic and atraumatic.      Mouth/Throat:      Mouth: Mucous membranes are moist.   Eyes:      Extraocular Movements: Extraocular movements intact.   Cardiovascular:      Rate and Rhythm: Normal rate and regular rhythm.      Pulses: Normal pulses.      Heart sounds: Normal heart sounds.   Pulmonary:      Effort: Pulmonary effort is normal.      Breath sounds: Rales present.   Abdominal:      Palpations: Abdomen is soft.   Musculoskeletal:         General: Normal range of motion.      Cervical back: Normal range of motion and neck supple.   Skin:     General: Skin is warm.      Capillary Refill: Capillary refill takes less than 2 seconds.   Neurological:      General: No focal deficit present.      Mental Status: She is alert and oriented to person, place, and time.   Psychiatric:         Mood and Affect: Mood normal.         Assessment:     1. Calcified granuloma of lung    2. Hilar mass    3. Solitary pulmonary nodule    4. Hyperlipidemia associated with type 2 diabetes mellitus    5. Primary hypertension    6. Paroxysmal SVT (supraventricular tachycardia)    7. Class 2 severe obesity due to excess calories with serious comorbidity in adult, unspecified BMI    8. Controlled type 2 diabetes mellitus without complication, without long-term  current use of insulin    9. Malignant neoplasm of lower lobe of left lung          Plan    Continue aggressive chest physical therapy incentive spirometry   Pain control as needed   Continue to increase activity level.   Keep the wound clean and dry   Follow-up with me in 1 month's time with a chest x-ray PA and lateral as an outpatient.          Mike Nuñez MD  [facility]  [place]

## 2024-05-27 ENCOUNTER — PATIENT MESSAGE (OUTPATIENT)
Dept: INTERNAL MEDICINE | Facility: CLINIC | Age: 74
End: 2024-05-27
Payer: MEDICARE

## 2024-05-30 DIAGNOSIS — E11.9 CONTROLLED TYPE 2 DIABETES MELLITUS WITHOUT COMPLICATION, WITHOUT LONG-TERM CURRENT USE OF INSULIN: Primary | ICD-10-CM

## 2024-05-30 LAB
ACID FAST MOD KINY STN SPEC: NORMAL
MYCOBACTERIUM SPEC QL CULT: NORMAL

## 2024-05-30 RX ORDER — DEXTROSE 4 G
TABLET,CHEWABLE ORAL
Qty: 1 EACH | Refills: 0 | Status: SHIPPED | OUTPATIENT
Start: 2024-05-30 | End: 2025-05-30

## 2024-05-31 LAB
DNA RANGE(S) EXAMINED NAR: NORMAL
GENE DIS ANL INTERP-IMP: POSITIVE
GENE DIS ASSESSED: NORMAL
GENE MUT TESTED BLD/T: 14.7 M/MB
MSI CA SPEC-IMP: NORMAL
REASON FOR STUDY: NORMAL
TEMPUS FUSIONADDENDUM: NORMAL
TEMPUS LCA: NORMAL
TEMPUS PERTINENTNEGATIVES: NORMAL
TEMPUS PORTAL: NORMAL
TEMPUS THERAPY1: NORMAL
TEMPUS THERAPYCOUNT: 1
TEMPUS TRIAL1: NORMAL
TEMPUS TRIAL2: NORMAL
TEMPUS TRIAL3: NORMAL
TEMPUS TRIALCOUNT: 3

## 2024-06-03 ENCOUNTER — TELEPHONE (OUTPATIENT)
Dept: INTERNAL MEDICINE | Facility: CLINIC | Age: 74
End: 2024-06-03
Payer: MEDICARE

## 2024-06-03 ENCOUNTER — NURSE TRIAGE (OUTPATIENT)
Dept: ADMINISTRATIVE | Facility: CLINIC | Age: 74
End: 2024-06-03
Payer: MEDICARE

## 2024-06-03 NOTE — TELEPHONE ENCOUNTER
Pt reports ring finger on right hand is swelling up since last night. Pt reports that the hand is very painful and she is unable to turn it over or use it.     Dispo is to go to ED now. Pt v/u.   Reason for Disposition   [1] Can't use hand or can barely use hand AND [2] new-onset    Additional Information   Negative: SEVERE difficulty breathing (e.g., struggling for each breath, speaks in single words)   Negative: Sounds like a life-threatening emergency to the triager   Negative: Difficulty breathing at rest   Negative: Looks like a broken bone or dislocated joint (e.g., crooked or deformed)   Negative: Entire hand is cool or blue in comparison to other side    Protocols used: Hand Swelling-A-AH

## 2024-06-05 ENCOUNTER — OFFICE VISIT (OUTPATIENT)
Dept: INTERNAL MEDICINE | Facility: CLINIC | Age: 74
End: 2024-06-05
Payer: MEDICARE

## 2024-06-05 VITALS
BODY MASS INDEX: 29.3 KG/M2 | HEIGHT: 63 IN | OXYGEN SATURATION: 95 % | HEART RATE: 105 BPM | WEIGHT: 165.38 LBS | TEMPERATURE: 98 F | SYSTOLIC BLOOD PRESSURE: 116 MMHG | DIASTOLIC BLOOD PRESSURE: 74 MMHG

## 2024-06-05 DIAGNOSIS — C34.32 MALIGNANT NEOPLASM OF LOWER LOBE OF LEFT LUNG: ICD-10-CM

## 2024-06-05 DIAGNOSIS — I47.10 PAROXYSMAL SVT (SUPRAVENTRICULAR TACHYCARDIA): ICD-10-CM

## 2024-06-05 DIAGNOSIS — M79.89 HAND SWELLING: ICD-10-CM

## 2024-06-05 DIAGNOSIS — E11.9 CONTROLLED TYPE 2 DIABETES MELLITUS WITHOUT COMPLICATION, WITHOUT LONG-TERM CURRENT USE OF INSULIN: Primary | ICD-10-CM

## 2024-06-05 DIAGNOSIS — R07.89 CHEST WALL PAIN FOLLOWING SURGERY: ICD-10-CM

## 2024-06-05 DIAGNOSIS — G89.18 CHEST WALL PAIN FOLLOWING SURGERY: ICD-10-CM

## 2024-06-05 DIAGNOSIS — I10 PRIMARY HYPERTENSION: ICD-10-CM

## 2024-06-05 PROCEDURE — 99999 PR PBB SHADOW E&M-EST. PATIENT-LVL IV: CPT | Mod: PBBFAC,,, | Performed by: FAMILY MEDICINE

## 2024-06-05 PROCEDURE — 1125F AMNT PAIN NOTED PAIN PRSNT: CPT | Mod: CPTII,S$GLB,, | Performed by: FAMILY MEDICINE

## 2024-06-05 PROCEDURE — 1101F PT FALLS ASSESS-DOCD LE1/YR: CPT | Mod: CPTII,S$GLB,, | Performed by: FAMILY MEDICINE

## 2024-06-05 PROCEDURE — 3044F HG A1C LEVEL LT 7.0%: CPT | Mod: CPTII,S$GLB,, | Performed by: FAMILY MEDICINE

## 2024-06-05 PROCEDURE — 1111F DSCHRG MED/CURRENT MED MERGE: CPT | Mod: CPTII,S$GLB,, | Performed by: FAMILY MEDICINE

## 2024-06-05 PROCEDURE — 99214 OFFICE O/P EST MOD 30 MIN: CPT | Mod: S$GLB,,, | Performed by: FAMILY MEDICINE

## 2024-06-05 PROCEDURE — 3078F DIAST BP <80 MM HG: CPT | Mod: CPTII,S$GLB,, | Performed by: FAMILY MEDICINE

## 2024-06-05 PROCEDURE — 4010F ACE/ARB THERAPY RXD/TAKEN: CPT | Mod: CPTII,S$GLB,, | Performed by: FAMILY MEDICINE

## 2024-06-05 PROCEDURE — 1159F MED LIST DOCD IN RCRD: CPT | Mod: CPTII,S$GLB,, | Performed by: FAMILY MEDICINE

## 2024-06-05 PROCEDURE — G2211 COMPLEX E/M VISIT ADD ON: HCPCS | Mod: S$GLB,,, | Performed by: FAMILY MEDICINE

## 2024-06-05 PROCEDURE — 3288F FALL RISK ASSESSMENT DOCD: CPT | Mod: CPTII,S$GLB,, | Performed by: FAMILY MEDICINE

## 2024-06-05 PROCEDURE — 3074F SYST BP LT 130 MM HG: CPT | Mod: CPTII,S$GLB,, | Performed by: FAMILY MEDICINE

## 2024-06-05 RX ORDER — SEMAGLUTIDE 2.68 MG/ML
2 INJECTION, SOLUTION SUBCUTANEOUS
Qty: 3 ML | Refills: 2 | Status: SHIPPED | OUTPATIENT
Start: 2024-06-05 | End: 2025-06-05

## 2024-06-05 RX ORDER — CODEINE PHOSPHATE AND GUAIFENESIN 10; 100 MG/5ML; MG/5ML
5 SOLUTION ORAL 3 TIMES DAILY
Qty: 473 ML | Refills: 0 | Status: SHIPPED | OUTPATIENT
Start: 2024-06-05

## 2024-06-05 NOTE — PROGRESS NOTES
Subjective:       Patient ID: Cassandra Campos is a 73 y.o. female.    Chief Complaint: Other    She has status post robotic pneumonectomy for lung cancer.  Pathology with negative lymph nodes and tumor not extending into the margins of resection.  She was told chemotherapy and radiation were not needed.  She will continue follow-up Cardiovascular surgery.  She also has diabetes on Ozempic.  She reports over the last 6 months her weight went from 205 1to 65.  She wants to continue Ozempic.  She has having no side effects.  Up-to-date A1c was 5.4.  She reports since surgery she is having chest discomfort with coughing.  She tried lidocaine patch that did not help.  She was told to follow-up with me for pain control.  She reports she used guaifenesin with codeine in the past that relieved her cough and chest wall pain.  Additionally she is on Eliquis.  She has also had recent onset of swelling of her right dorsal hand.  Emergency room evaluation yesterday was done.  She was told blood work and x-rays were negative.  She was told to put warm compresses.  This seems to be a site of previous IV.      Review of Systems   Constitutional:  Negative for appetite change, chills, fever and unexpected weight change.   Respiratory:  Positive for cough. Negative for chest tightness, shortness of breath and wheezing.         Chest wall pain with coughing   Cardiovascular:  Negative for chest pain, palpitations and leg swelling.   Gastrointestinal:  Negative for abdominal distention.   Neurological:  Positive for weakness. Negative for dizziness, light-headedness and headaches.       Objective:      Physical Exam  Constitutional:       Appearance: She is not ill-appearing or diaphoretic.   Cardiovascular:      Rate and Rhythm: Normal rate and regular rhythm.      Heart sounds: No murmur heard.     No gallop.   Pulmonary:      Effort: Pulmonary effort is normal. No respiratory distress.      Breath sounds: Rales present. No  wheezing or rhonchi.      Comments: Occasional dry basilar rales  Abdominal:      General: There is no distension.   Musculoskeletal:      Right foot: No deformity. Right foot bunion: Good capillary filling.     Left foot: No deformity.   Feet:      Right foot:      Protective Sensation: 10 sites tested.  10 sites sensed.      Skin integrity: Skin integrity normal.      Toenail Condition: Right toenails are abnormally thick.      Left foot:      Protective Sensation: 10 sites tested.  10 sites sensed.      Skin integrity: Skin integrity normal.      Toenail Condition: Left toenails are abnormally thick.   Lymphadenopathy:      Cervical: No cervical adenopathy.   Skin:     Comments: Mild swelling tenderness of the right dorsal hand.  Duration.  No redness.   Neurological:      Mental Status: She is alert.   Psychiatric:         Mood and Affect: Mood normal.         Behavior: Behavior normal.         Orders Only on 05/20/2024   Component Date Value Ref Range Status    Reason for Study 05/31/2024 To identify somatic and germline mutations relevant to patient's cancer.   Final    Genetic Diseases Assessed 05/31/2024 Cancer   Final    Description of Ranges of DNA Seque* 05/31/2024 648 gene panel   Final    Overall Interpretation 05/31/2024 positive   Final    MSI 05/31/2024 Stable   Final    TMB 05/31/2024 14.7  m/MB Final    [facility] Portal 05/31/2024 [email or web address]   Final    Fusion Addendum Issue Type 05/31/2024    Final                    Value:NEGATIVE  Negative - This addendum is being issued to report that no gene fusions or altered splicing variants from RNA sequencing analysis were found. This report is being issued to report the results of gene rearrangement and altered splicing analysis from RNA sequencing. No gene rearrangements nor reportable altered splicing events were identified from RNA  sequencing.      Pertinent Negatives 05/31/2024 EGFR, BRAF, ALK, ROS1, RET, MET, ERBB2 (HER2)   Final    Low Coverage Regions 05/31/2024 KDM5D   Final    Therapy Count 05/31/2024 1   Final    [facility]: Potential Therapy 1 05/31/2024    Final                    Value:Gene: 9820^RAD51C^HGNC  Variant: p.E334*  Agent: Olaparib  Tissue: Prostate Cancer  Association: response  Evidence Status: Consensus  Evidence ID: NCCN  Evidence URL:   FDA Approved?: Yes  on label?: No      Trial Count 05/31/2024 3   Final    [facility]: Clinical Trial Match 1 05/31/2024    Final                    Value:Clinical Trial NCT ID: [id number]  Clinical Trial Title: Study of Safety, Pharmacokinetics, and Antitumor Activity of [id number] in Participants With Advanced or Refractory Tumors  Clinical Trial URL: https://clinicaltrials.gov/ct2/show/DVL07095961  Clinical Phase: Phase 1  Matched criteria: KRAS p.G12D mutation      [facility]: Clinical Trial Match 2 05/31/2024    Final                    Value:Clinical Trial NCT ID: [id number]  Clinical Trial Title: Testing the Combination of the Anti-cancer Drugs [id number] ([id number]) and Talazoparib in Patients With Advanced Solid Tumors, The ComBET Trial  Clinical Trial URL: https://clinicaltrials.gov/ct2/show/PWX04820460  Clinical Phase: Phase 2  Matched criteria: KRAS p.G12D mutation, RAD51C p.E334* mutation      [facility]: Clinical Trial Match 3 05/31/2024    Final                    Value:Clinical Trial NCT ID: [id number]  Clinical Trial Title: A Study Investigating the Safety, Tolerability, Pharmacokinetics, and Preliminary Antitumor Activity of [id number]  Clinical Trial URL: https://clinicaltrials.gov/ct2/show/ZQF64031802  Clinical Phase: Phase 1       Assessment:       1. Controlled type 2 diabetes mellitus without complication, without long-term current use of insulin    2. Paroxysmal SVT (supraventricular tachycardia)    3. Primary hypertension    4. Malignant neoplasm of lower lobe of left lung    5.  Chest wall pain following surgery    6. Hand swelling        Plan:   Refill Ozempic.  Refill guaifenesin with codeine to take 3 times a day for chest wall pain postop.  She denies any history narcotic abuse.  A1c is good.  Elevation warm compresses to swelling of the right dorsal hand.  Follow-up one-month.  Diabetic foot exam was done.  She has a history of paroxysmal supraventricular tachycardia.  Her pulse today is 95 and regular.  Blood pressure controlled      Controlled type 2 diabetes mellitus without complication, without long-term current use of insulin    Paroxysmal SVT (supraventricular tachycardia)    Primary hypertension    Malignant neoplasm of lower lobe of left lung    Chest wall pain following surgery    Hand swelling

## 2024-06-07 ENCOUNTER — TELEPHONE (OUTPATIENT)
Dept: INTERNAL MEDICINE | Facility: CLINIC | Age: 74
End: 2024-06-07
Payer: MEDICARE

## 2024-06-07 NOTE — TELEPHONE ENCOUNTER
Returned call to Rosario regarding Home Health. She advised patient does not want to continue the services.

## 2024-06-09 ENCOUNTER — NURSE TRIAGE (OUTPATIENT)
Dept: ADMINISTRATIVE | Facility: CLINIC | Age: 74
End: 2024-06-09
Payer: MEDICARE

## 2024-06-09 NOTE — TELEPHONE ENCOUNTER
Pt would like follow up regarding scheduling an appointment for steroid injections in the shoulders. Reports she would like this so she is able to care for herself and not go into a living facility. I have suggested she follow up with office in the AM but notified her I would send a message for follow up as well.    Reason for Disposition   [1] Caller has NON-URGENT medicine question about med that PCP prescribed AND [2] triager unable to answer question    Protocols used: Medication Question Call-A-AH

## 2024-06-12 ENCOUNTER — OFFICE VISIT (OUTPATIENT)
Dept: SPORTS MEDICINE | Facility: CLINIC | Age: 74
End: 2024-06-12
Payer: MEDICARE

## 2024-06-12 VITALS — WEIGHT: 165.38 LBS | HEIGHT: 63 IN | BODY MASS INDEX: 29.3 KG/M2

## 2024-06-12 DIAGNOSIS — M67.813 TENDINOSIS OF RIGHT ROTATOR CUFF: Primary | ICD-10-CM

## 2024-06-12 DIAGNOSIS — M17.0 PRIMARY OSTEOARTHRITIS OF BOTH KNEES: ICD-10-CM

## 2024-06-12 DIAGNOSIS — M25.561 RIGHT KNEE PAIN, UNSPECIFIED CHRONICITY: ICD-10-CM

## 2024-06-12 PROCEDURE — 3008F BODY MASS INDEX DOCD: CPT | Mod: CPTII,S$GLB,, | Performed by: STUDENT IN AN ORGANIZED HEALTH CARE EDUCATION/TRAINING PROGRAM

## 2024-06-12 PROCEDURE — 99999 PR PBB SHADOW E&M-EST. PATIENT-LVL IV: CPT | Mod: PBBFAC,,, | Performed by: STUDENT IN AN ORGANIZED HEALTH CARE EDUCATION/TRAINING PROGRAM

## 2024-06-12 PROCEDURE — 4010F ACE/ARB THERAPY RXD/TAKEN: CPT | Mod: CPTII,S$GLB,, | Performed by: STUDENT IN AN ORGANIZED HEALTH CARE EDUCATION/TRAINING PROGRAM

## 2024-06-12 PROCEDURE — 1111F DSCHRG MED/CURRENT MED MERGE: CPT | Mod: CPTII,S$GLB,, | Performed by: STUDENT IN AN ORGANIZED HEALTH CARE EDUCATION/TRAINING PROGRAM

## 2024-06-12 PROCEDURE — 1159F MED LIST DOCD IN RCRD: CPT | Mod: CPTII,S$GLB,, | Performed by: STUDENT IN AN ORGANIZED HEALTH CARE EDUCATION/TRAINING PROGRAM

## 2024-06-12 PROCEDURE — 99214 OFFICE O/P EST MOD 30 MIN: CPT | Mod: 25,S$GLB,, | Performed by: STUDENT IN AN ORGANIZED HEALTH CARE EDUCATION/TRAINING PROGRAM

## 2024-06-12 PROCEDURE — 20611 DRAIN/INJ JOINT/BURSA W/US: CPT | Mod: RT,S$GLB,, | Performed by: STUDENT IN AN ORGANIZED HEALTH CARE EDUCATION/TRAINING PROGRAM

## 2024-06-12 PROCEDURE — 3044F HG A1C LEVEL LT 7.0%: CPT | Mod: CPTII,S$GLB,, | Performed by: STUDENT IN AN ORGANIZED HEALTH CARE EDUCATION/TRAINING PROGRAM

## 2024-06-12 PROCEDURE — 1125F AMNT PAIN NOTED PAIN PRSNT: CPT | Mod: CPTII,S$GLB,, | Performed by: STUDENT IN AN ORGANIZED HEALTH CARE EDUCATION/TRAINING PROGRAM

## 2024-06-12 RX ORDER — MELOXICAM 15 MG/1
15 TABLET ORAL DAILY
Qty: 30 TABLET | Refills: 1 | Status: SHIPPED | OUTPATIENT
Start: 2024-06-12

## 2024-06-12 RX ORDER — TRIAMCINOLONE ACETONIDE 40 MG/ML
40 INJECTION, SUSPENSION INTRA-ARTICULAR; INTRAMUSCULAR
Status: DISCONTINUED | OUTPATIENT
Start: 2024-06-12 | End: 2024-06-12 | Stop reason: HOSPADM

## 2024-06-12 RX ADMIN — TRIAMCINOLONE ACETONIDE 40 MG: 40 INJECTION, SUSPENSION INTRA-ARTICULAR; INTRAMUSCULAR at 09:06

## 2024-06-12 NOTE — PROCEDURES
Large Joint Aspiration/Injection: R subacromial bursa    Date/Time: 6/12/2024 9:40 AM    Performed by: Naman Barton MD  Authorized by: Naman Barton MD    Consent Done?:  Yes (Verbal)  Indications:  Pain  Site marked: the procedure site was marked    Timeout: prior to procedure the correct patient, procedure, and site was verified    Prep: patient was prepped and draped in usual sterile fashion    Local anesthetic:  Bupivacaine 0.5% without epinephrine and lidocaine 1% without epinephrine    Details:  Needle Size:  21 G  Ultrasonic Guidance for needle placement?: Yes    Images are saved and documented.  Approach:  Lateral  Location:  Shoulder  Site:  R subacromial bursa  Medications:  40 mg triamcinolone acetonide 40 mg/mL  Patient tolerance:  Patient tolerated the procedure well with no immediate complications     Ultrasound guidance was used for needle localization. Images were saved and stored for documentation. The appropriate structures were visualized. Dynamic visualization of the needle was continuous throughout the procedures and maintained good position.

## 2024-06-12 NOTE — PROCEDURES
Sports Medicine US - Guidance for Needle Placement    Date/Time: 6/12/2024 9:40 AM    Performed by: Naman Barton MD  Authorized by: Naman Barton MD  Preparation: Patient was prepped and draped in the usual sterile fashion.  Local anesthesia used: no    Anesthesia:  Local anesthesia used: no    Sedation:  Patient sedated: no    Patient tolerance: patient tolerated the procedure well with no immediate complications  Comments: Ultrasound guidance was used for needle localization. Images were saved and stored for documentation. The appropriate structures were visualized. Dynamic visualization of the needle was continuous throughout the procedures and maintained good position.

## 2024-06-12 NOTE — PROGRESS NOTES
Patient ID: Cassandra Campos  YOB: 1950  MRN: [id number]    Chief Complaint: Pain in both shoulders and both knees      Referred By: self    History of Present Illness: Cassandra Campos is a right-hand dominant 73 y.o. female who presents today with bilateral shoulder pain and bilateral knee pain.  73-year-old female with history of HTN, HLD, and DM  with c/o left shoulder pain.  States pain to left shoulder with touch and movement. States difficulty lifting shoulder above head and mostly keeping her up at night.. States taking OTC NSAIDs with minimal relief. She was seen by  POLO Alan and received left knee CSI on 3/22/24. Received SA steroid injection by Dr. Palma on 2/07/2024.  Pt states she was concerned she is still having pain. MRI left shoulder on file. Patient is currently doing Home health physical therapy.     The patient is active in none.  Occupation: retired      Past Medical History:   Past Medical History:   Diagnosis Date    Arthritis     hands    Bilateral bunions     Borderline glaucoma     De Quervain's disease (radial styloid tenosynovitis)     Gastritis     upper GI 2/2017    Hydradenitis     Hyperlipidemia     Hypertension     Insomnia     Migraines 02/01/2000    Nasal septum perforation     Obesity     Pneumonia     Restrictive airway disease     Sleep apnea     SVT (supraventricular tachycardia) 09/2013    Trigger finger     Type 2 diabetes mellitus 2012     am 02/01/2024     Past Surgical History:   Procedure Laterality Date    AXILLARY HIDRADENITIS EXCISION Bilateral     BONE EXOSTOSIS EXCISION Right 07/25/2018    Procedure: EXCISION, EXOSTOSIS;  Surgeon: Jayro Pedraza Sr., MD;  Location: [facility];  Service: Orthopedics;  Laterality: Right;    BREAST BIOPSY Bilateral     both benign    BREAST SURGERY  07/1998    CARPAL TUNNEL RELEASE      bilateral    CARPAL TUNNEL RELEASE Right 02/09/2024    Procedure: RELEASE, CARPAL TUNNEL;  Surgeon: Jaime Oswald  MD Dmitri;  Location: [facility];  Service: Orthopedics;  Laterality: Right;    CARPAL TUNNEL RELEASE Left 2024    Procedure: RELEASE, CARPAL TUNNEL;  Surgeon: Jaime Oswald MD;  Location: [facility];  Service: Orthopedics;  Laterality: Left;    CATARACT EXTRACTION Bilateral     OU     SECTION  1979    CHOLECYSTECTOMY  2014    COLONOSCOPY N/A 10/02/2020    Procedure: COLONOSCOPY;  Surgeon: Tushar Edwards MD;  Location: [facility];  Service: Endoscopy;  Laterality: N/A;    COLONOSCOPY W/ POLYPECTOMY  10/02/2020    Polyps x3, repeat 5 years; Tushar Edwards MD     CYST REMOVAL  2015    sebaceous cyst removed from face    DE QUERVAIN'S RELEASE Left 2020    Procedure: RELEASE, HAND, FOR DEQUERVAIN'S TENOSYNOVITIS;  Surgeon: Jaime Oswald MD;  Location: [facility] OR;  Service: Orthopedics;  Laterality: Left;    DE QUERVAIN'S RELEASE Right 2020    Procedure: RELEASE, HAND, FOR DEQUERVAIN'S TENOSYNOVITIS;  Surgeon: Jaime Oswald MD;  Location: [facility];  Service: Orthopedics;  Laterality: Right;    ENDOBRONCHIAL ULTRASOUND Bilateral 04/10/2024    Procedure: ENDOBRONCHIAL ULTRASOUND (EBUS);  Surgeon: Adrian Robin MD;  Location: [facility];  Service: Pulmonary;  Laterality: Bilateral;    EYE SURGERY      gastric sleeve  2017    Dr. Watson    INJECTION OF ANESTHETIC AGENT AROUND MULTIPLE INTERCOSTAL NERVES  5/10/2024    Procedure: BLOCK, NERVE, INTERCOSTAL, 2 OR MORE;  Surgeon: Mike Nuñez MD;  Location: [facility];  Service: Cardiothoracic;;    KNEE SURGERY Right     OLECRANON BURSECTOMY Right 2018    Procedure: BURSECTOMY, OLECRANON;  Surgeon: Jayro Pedraza Sr., MD;  Location: [facility];  Service: Orthopedics;  Laterality: Right;    SURGICAL REMOVAL OF BUNION WITH OSTEOTOMY OF METATARSAL BONE Left 05/10/2019    Procedure: BUNIONECTOMY, WITH METATARSAL OSTEOTOMY;  Surgeon: Srinivasan Villanueva DPM;  Location: [facility];  Service: Podiatry;  Laterality: Left;     SURGICAL REMOVAL OF BUNION WITH OSTEOTOMY OF METATARSAL BONE Right 2019    Procedure: BUNIONECTOMY, WITH METATARSAL OSTEOTOMY;  Surgeon: Srinivasan Villanueva DPM;  Location: [facility] OR;  Service: Podiatry;  Laterality: Right;    SURGICAL REMOVAL OF LYMPH NODE Left 5/10/2024    Procedure: EXCISION, LYMPH NODE;  Surgeon: Mike Nuñez MD;  Location: [facility] OR;  Service: Cardiothoracic;  Laterality: Left;    TONSILLECTOMY, ADENOIDECTOMY  1980s    TRANSESOPHAGEAL ECHOCARDIOGRAM WITH POSSIBLE CARDIOVERSION (LAKESHIA W/ POSS CARDIOVERSION) N/A 5/15/2024    Procedure: Transesophageal echo (LAKESHIA) intra-procedure log documentation;  Surgeon: Twan Pelaez MD;  Location: [facility] CATH LAB;  Service: Cardiology;  Laterality: N/A;    TRIGGER FINGER RELEASE Right 2015    Dr. Milo HALL ROBOTIC RATS,WITH LOBECTOMY,LUNG Left 5/10/2024    Procedure: XI ROBOTIC RATS,WITH LOBECTOMY,LUNG;  Surgeon: Mike Nuñez MD;  Location: [facility] OR;  Service: Cardiothoracic;  Laterality: Left;  Lingulectomy     Family History   Problem Relation Name Age of Onset    Prostate cancer Brother      Diabetes Maternal Aunt Alondra Campos     Diabetes Cousin      Hypertension Maternal Grandmother Portia Orosco      Social History     Socioeconomic History    Marital status:     Number of children: 1   Occupational History    Occupation:  aid   Tobacco Use    Smoking status: Former     Current packs/day: 0.00     Average packs/day: 0.5 packs/day for 42.0 years (21.0 ttl pk-yrs)     Types: Cigarettes     Start date: 1970     Quit date: 2012     Years since quittin.4     Passive exposure: Past    Smokeless tobacco: Never   Substance and Sexual Activity    Alcohol use: Not Currently     Alcohol/week: 1.0 standard drink of alcohol     Types: 1 Glasses of wine per week     Comment: Glass red wine once a every 2 weeks    Drug use: Never    Sexual activity: Not Currently     Partners: Female     Birth control/protection:  Abstinence, None   Social History Narrative    Single, part-time teacher. Masters degree biology.      Social Determinants of Health     Financial Resource Strain: Low Risk  (12/19/2023)    Overall Financial Resource Strain (CARDIA)     Difficulty of Paying Living Expenses: Not hard at all   Food Insecurity: Food Insecurity Present (12/19/2023)    Hunger Vital Sign     Worried About Running Out of Food in the Last Year: Never true     Ran Out of Food in the Last Year: Sometimes true   Transportation Needs: No Transportation Needs (12/19/2023)    PRAPARE - Transportation     Lack of Transportation (Medical): No     Lack of Transportation (Non-Medical): No   Physical Activity: Insufficiently Active (12/19/2023)    Exercise Vital Sign     Days of Exercise per Week: 3 days     Minutes of Exercise per Session: 20 min   Stress: No Stress Concern Present (12/19/2023)    [place] [employer] - Occupational Stress Questionnaire     Feeling of Stress : Not at all   Housing Stability: Low Risk  (12/19/2023)    Housing Stability Vital Sign     Unable to Pay for Housing in the Last Year: No     Number of Places Lived in the Last Year: 1     Unstable Housing in the Last Year: No     Medication List with Changes/Refills   New Medications    MELOXICAM (MOBIC) 15 MG TABLET    Take 1 tab daily for 14 days, then daily as needed.   Current Medications    ALBUTEROL (PROVENTIL/VENTOLIN HFA) 90 MCG/ACTUATION INHALER    INHALE 2 PUFFS INTO THE LUNGS EVERY 4 HOURS AS NEEDED FOR WHEEZING OR SHORTNESS OF BREATH.    AMITRIPTYLINE (ELAVIL) 100 MG TABLET    Take 1 tablet (100 mg total) by mouth every evening.    APIXABAN (ELIQUIS) 5 MG TAB    Take 1 tablet (5 mg total) by mouth 2 (two) times daily.    BLOOD-GLUCOSE METER (ACCU-CHEK GUIDE ME GLUCOSE MTR) MISC    use to check blood glucose 2 times a day    BLOOD-GLUCOSE SENSOR (DEXCOM G7 SENSOR) LUISA    1 each by Misc.(Non-Drug; Combo Route) route continuous.    ESZOPICLONE  (LUNESTA) 3 MG TAB    Take 1 tablet (3 mg total) by mouth every evening.    GUAIFENESIN-CODEINE 100-10 MG/5 ML (TUSSI-ORGANIDIN NR)  MG/5 ML SYRUP    Take 5 mLs by mouth 3 (three) times daily.    LIDOCAINE (LIDODERM) 5 %    Place 1 patch onto the skin once daily. Remove & Discard patch within 12 hours or as directed by MD    LORAZEPAM (ATIVAN) 1 MG TABLET    Take 1 tablet (1 mg total) by mouth 2 (two) times daily.    LOSARTAN (COZAAR) 25 MG TABLET    TAKE 1 TABLET BY MOUTH ONCE  DAILY    METOPROLOL SUCCINATE (TOPROL-XL) 50 MG 24 HR TABLET    TAKE 1 TABLET BY MOUTH ONCE  DAILY    OMEPRAZOLE (PRILOSEC) 20 MG CAPSULE    TAKE 1 CAPSULE BY MOUTH ONCE  DAILY    SEMAGLUTIDE (OZEMPIC) 2 MG/DOSE (8 MG/3 ML) PNIJ    Inject 2 mg into the skin every 7 days.     Review of patient's allergies indicates:  No Known Allergies    Physical Exam:   Body mass index is 29.29 kg/m².    GENERAL: Well appearing, in no acute distress.  HEAD: Normocephalic and atraumatic.  ENT: External ears and nose grossly normal.  EYES: EOMI bilaterally  PULMONARY: Respirations are grossly even and non-labored.  NEURO: Awake, alert, and oriented x 3.  SKIN: No obvious rashes appreciated.  PSYCH: Mood & affect are appropriate.    Detailed MSK exam:     Right shoulder exam:   -ROM: abduction 100, forward flexion 100, external rotation 70, internal rotation 60  -empty can test pain but no weakness, resisted ER negative, belly press pain but no weakness  -landaverde test positive, neers test positive, whipple test positive  -biceps load test negative, yerguson test negative, [place]met[place]'s test negative  -sensation intact, pulses 2+  -TTP: lateral cuff insertion    Left shoulder exam:   -ROM: abduction 100, forward flexion 100, external rotation 70, internal rotation 60  -empty can test pain but no weakness, resisted ER negative, belly press pain but no weakness  -landaverde test positive, neers test positive, whipple test positive  -biceps load test negative,  yerguson test negative, [place]'s test negative  -sensation intact, pulses 2+  -TTP: lateral cuff insertion    Right knee exam:   -ROM: extension 0, flexion 120  -TTP: Medial joint line  -effusion: none  -Patellar apprehension negative  -Thor test negative  -stable to varus and valgus stress tests  -Lachman test negative, anterior drawer test negative, posterior drawer test negative    Left knee exam:   -ROM: extension 0, flexion 120  -TTP: Medial joint line  -effusion: none  -Patellar apprehension negative  -Thor test negative  -stable to varus and valgus stress tests  -Lachman test negative, anterior drawer test negative, posterior drawer test negative      Imaging:  Transesophageal echo (LAKESHIA) with possible cardioversion    Left Ventricle: The left ventricle is normal in size. Normal wall   thickness. There is normal systolic function with a visually estimated   ejection fraction of 55 - 60%. There is normal diastolic function.    Right Ventricle: Normal right ventricular cavity size. Systolic   function is normal.    Left Atrium: The left atrial appendage appears normal. The left atrial   appendage has a windsock morphology. Appendage velocity is normal at   greater than 40 cm/sec. There is no thrombus in the left atrial appendage.    IVC/SVC: Normal venous pressure at 3 mmHg.    The pre-cardioversion rhythm was atrial flutter. A 150 J synchronized   cardioversion was successfully performed with restoration of normal sinus   rhythm. The post-cardioversion rhythm was normal sinus rhythm. Post   cardioversion heart rate was 68 BPM.  Intra-Procedure Documentation  LAKESHIA performed in the Invasive Lab    - See Procedure Log link below for nursing documentation    - See LAKESHIA order on Card Proc Tab for physician findings         Relevant imaging results were reviewed and interpreted by me and per my read shows mild arthritic changes bilateral knees.  This was discussed with the patient and / or family today.      Assessment:  Cassandra Campos is a 73 y.o. female presenting with bilateral shoulder and bilateral knee pain.   History, physical and radiographs are consistent with a likely diagnosis of OA, RC tendinopathy.   Plan: Steroid injection given today (see separate procedure note for details). We discussed the proper protocols after the injection such as no submerging pools, baths tubs, or hot tubs for 24 hr.  Showering is okay today.  We also discussed that blood sugars can be elevated after an injection and asked patient to properly checked her sugars over the next few days and contact their PCP if there are any concerns.  We discussed red flags such as fevers, chills, red, warm, tender joint at the area of injection to please seek medical care immediately.   Meloxicam prescription. Consider gel injection if not improving. Consider pain referral. Continue home health PT. Continue conservative management for pain.   Follow up 2 weeks. Plan to do left knee and left shoulder injections at that time. All questions answered.      Tendinosis of right rotator cuff  -     Sports Medicine US - Guidance for Needle Placement  -     meloxicam (MOBIC) 15 MG tablet; Take 1 tab daily for 14 days, then daily as needed.  Dispense: 30 tablet; Refill: 1  -     Large Joint Aspiration/Injection: R subacromial bursa    Right knee pain, unspecified chronicity  -     Sports Medicine US - Guidance for Needle Placement  -     Large Joint Aspiration/Injection: R knee    Primary osteoarthritis of both knees  -     meloxicam (MOBIC) 15 MG tablet; Take 1 tab daily for 14 days, then daily as needed.  Dispense: 30 tablet; Refill: 1  -     Large Joint Aspiration/Injection: R knee         Ultrasound guidance was used for needle localization. Images were saved and stored for documentation. The appropriate structures were visualized. Dynamic visualization of the needle was continuous throughout the procedures and maintained good position.       MEDICAL NECESSITY FOR VISCOSUPPLEMENTATION: After thorough evaluation of the patient, I have determined that visco-supplementation is medically necessary. The patient has painful degenerative changes of the knee with failure of conservative treatments including lifestyle modifications and rehabilitation exercises.  Oral analgesis/NSAIDs have not adequately controlled symptoms and there is radiographic evidence of Kellgren Sanchez grade 2 or greater osteoarthritic changes, or in lack of radiographic evidence, there is arthroscopic or other evidence of chondrosis.       A copy of today's visit note has been sent to the referring provider.     Electronically signed:  Naman Barton MD, MPH  06/12/2024  10:02 AM

## 2024-06-12 NOTE — PATIENT INSTRUCTIONS
Assessment:  Cassandra Campos is a 73 y.o. female No chief complaint on file.      Encounter Diagnoses   Name Primary?    Tendinosis of right rotator cuff Yes    Right knee pain, unspecified chronicity         Plan:  Ultrasound guided cortisone injection to the right subacromial bursa of shoulder and right knee  We discussed the proper protocols after the injection such as no submerging pools, baths tubs, or hot tubs for 24 hr.  Showering is okay today.  We also discussed that blood sugars can be elevated after an injection and asked patient to properly checked her sugars over the next few days and contact their PCP if there are any concerns.  We discussed red flags such as fevers, chills, red, warm, tender joint at the area of injection to please seek medical care immediately.    You were prescribed meloxicam today as an anti-inflammatory medicine for the pain you are having.  Please take this medicine once a day with food for 2 weeks, and then as needed for days with significant recurrent pain.  Please do not take any other anti-inflammatories such as naproxen, ibuprofen, Aleve at the same time you are taking this medicine.  Patient may use over the counter lidocaine patches as needed for pain.  Patient may use ice and heat as needed for pain every 2 hours for 15 minutes  Follow up in 2 weeks for injection to the left shoulder and left knee    Follow-up: 2 weeks or sooner if there are problems between now and then.    Thank you for choosing [facility] and Dr. Naman Barton for your orthopedic & sports medicine care. It is our goal to provide you with exceptional care that will help keep you healthy, active, and get you back in the game.    Please do not hesitate to reach out to us via email, phone, or [employer] with any questions, concerns, or feedback.    If you felt that you received exemplary care today, please consider leaving us feedback on [employer]  at:  [email or web address]    If you are experiencing pain/discomfort ,or have questions after 5pm and would like to be connected to the [facility] on-call team, please call this number and specify which Sports Medicine provider is treating you: [phone]

## 2024-06-12 NOTE — PROCEDURES
Large Joint Aspiration/Injection: R knee    Date/Time: 6/12/2024 9:40 AM    Performed by: Naman Barton MD  Authorized by: Naman Barton MD    Consent Done?:  Yes (Verbal)  Indications:  Arthritis and pain  Site marked: the procedure site was marked    Timeout: prior to procedure the correct patient, procedure, and site was verified    Prep: patient was prepped and draped in usual sterile fashion    Local anesthetic:  Bupivacaine 0.5% without epinephrine and lidocaine 1% without epinephrine    Details:  Needle Size:  21 G  Ultrasonic Guidance for needle placement?: Yes    Images are saved and documented.  Approach:  Lateral (superior)  Location:  Knee  Site:  R knee  Medications:  40 mg triamcinolone acetonide 40 mg/mL  Patient tolerance:  Patient tolerated the procedure well with no immediate complications     Ultrasound guidance was used for needle localization. Images were saved and stored for documentation. The appropriate structures were visualized. Dynamic visualization of the needle was continuous throughout the procedures and maintained good position.

## 2024-06-17 ENCOUNTER — TELEPHONE (OUTPATIENT)
Dept: RADIATION ONCOLOGY | Facility: CLINIC | Age: 74
End: 2024-06-17
Payer: MEDICARE

## 2024-06-17 NOTE — TELEPHONE ENCOUNTER
Attempted to call patient to schedule Rad Onc consult but there was no answer. Left a detailed message including our direct number to call back to schedule appt.

## 2024-06-24 ENCOUNTER — OFFICE VISIT (OUTPATIENT)
Dept: CARDIOLOGY | Facility: CLINIC | Age: 74
End: 2024-06-24
Payer: MEDICARE

## 2024-06-24 VITALS
DIASTOLIC BLOOD PRESSURE: 76 MMHG | RESPIRATION RATE: 16 BRPM | HEIGHT: 63 IN | OXYGEN SATURATION: 96 % | HEART RATE: 106 BPM | BODY MASS INDEX: 29.61 KG/M2 | SYSTOLIC BLOOD PRESSURE: 107 MMHG | WEIGHT: 167.13 LBS

## 2024-06-24 DIAGNOSIS — I48.3 TYPICAL ATRIAL FLUTTER: Primary | ICD-10-CM

## 2024-06-24 DIAGNOSIS — E78.5 HYPERLIPIDEMIA ASSOCIATED WITH TYPE 2 DIABETES MELLITUS: Chronic | ICD-10-CM

## 2024-06-24 DIAGNOSIS — I15.9 SECONDARY HYPERTENSION: ICD-10-CM

## 2024-06-24 DIAGNOSIS — I10 PRIMARY HYPERTENSION: ICD-10-CM

## 2024-06-24 DIAGNOSIS — R91.1 SOLITARY PULMONARY NODULE: ICD-10-CM

## 2024-06-24 DIAGNOSIS — I10 PRIMARY HYPERTENSION: Primary | ICD-10-CM

## 2024-06-24 DIAGNOSIS — I47.10 PAROXYSMAL SVT (SUPRAVENTRICULAR TACHYCARDIA): ICD-10-CM

## 2024-06-24 DIAGNOSIS — E11.69 HYPERLIPIDEMIA ASSOCIATED WITH TYPE 2 DIABETES MELLITUS: Chronic | ICD-10-CM

## 2024-06-24 DIAGNOSIS — C34.32 MALIGNANT NEOPLASM OF LOWER LOBE OF LEFT LUNG: ICD-10-CM

## 2024-06-24 DIAGNOSIS — I70.0 AORTIC ATHEROSCLEROSIS: ICD-10-CM

## 2024-06-24 PROCEDURE — 99999 PR PBB SHADOW E&M-EST. PATIENT-LVL V: CPT | Mod: PBBFAC,,, | Performed by: INTERNAL MEDICINE

## 2024-06-24 PROCEDURE — 1101F PT FALLS ASSESS-DOCD LE1/YR: CPT | Mod: CPTII,S$GLB,, | Performed by: INTERNAL MEDICINE

## 2024-06-24 PROCEDURE — 3044F HG A1C LEVEL LT 7.0%: CPT | Mod: CPTII,S$GLB,, | Performed by: INTERNAL MEDICINE

## 2024-06-24 PROCEDURE — 93005 ELECTROCARDIOGRAM TRACING: CPT

## 2024-06-24 PROCEDURE — 3078F DIAST BP <80 MM HG: CPT | Mod: CPTII,S$GLB,, | Performed by: INTERNAL MEDICINE

## 2024-06-24 PROCEDURE — 4010F ACE/ARB THERAPY RXD/TAKEN: CPT | Mod: CPTII,S$GLB,, | Performed by: INTERNAL MEDICINE

## 2024-06-24 PROCEDURE — 1160F RVW MEDS BY RX/DR IN RCRD: CPT | Mod: CPTII,S$GLB,, | Performed by: INTERNAL MEDICINE

## 2024-06-24 PROCEDURE — 3008F BODY MASS INDEX DOCD: CPT | Mod: CPTII,S$GLB,, | Performed by: INTERNAL MEDICINE

## 2024-06-24 PROCEDURE — 3074F SYST BP LT 130 MM HG: CPT | Mod: CPTII,S$GLB,, | Performed by: INTERNAL MEDICINE

## 2024-06-24 PROCEDURE — 3288F FALL RISK ASSESSMENT DOCD: CPT | Mod: CPTII,S$GLB,, | Performed by: INTERNAL MEDICINE

## 2024-06-24 PROCEDURE — 99214 OFFICE O/P EST MOD 30 MIN: CPT | Mod: S$GLB,,, | Performed by: INTERNAL MEDICINE

## 2024-06-24 PROCEDURE — 1159F MED LIST DOCD IN RCRD: CPT | Mod: CPTII,S$GLB,, | Performed by: INTERNAL MEDICINE

## 2024-06-24 PROCEDURE — 93010 ELECTROCARDIOGRAM REPORT: CPT | Mod: S$GLB,,, | Performed by: INTERNAL MEDICINE

## 2024-06-24 NOTE — PROGRESS NOTES
Subjective:   Patient ID:  Cassandra Campos is a 73 y.o. female who presents for follow up of No chief complaint on file.      74 yo, female, came in for post LAKESHIA and DCCV FOR AFL  PMH PSVT, pAFL post op, HTN, HLP, NIDDM 10 yrs, obesity, ex smoker, DANIEL, not on CPAP. H/o lung Ca s/p left robotic lingual lobectomy and mediastinal lymph node dissection for a high-grade adenocarcinoma in 05/24.     No h/o MI CVA  Had bariatric surgery done in . Uncomplicated and last 30 pounds.  Walks 2 miles daily.   No smoking and occasional drink  Lives alone and no limitation of exercise  Patient feels OK, no chest pain, no sob, no palpitation, no dizziness, no syncope, no CNS symptoms.    04/24 visit  Plan robotic lobectomy for lung ca. Quit smoking 20 yrs ago.  Walks at home. No chest pain dizziness orthopnea . On inhaler as needed  EKG reviewed by myself today reveals NSR nonspecific STT change sinus tachy and LVH.  No change compared to prior one in 2020.    Interval history  S/p Left robotic lingual lobectomy and mediastinal lymph node dissection for a high-grade adenocarcinoma on 05/10/24  Post OP developed afl. And had LAKESHIA and DCCV converted to SR  TODAY EKG NSR  Oncology eval pending  No palpitation dizziness and chest pain   No orthopnea   and BP C                                         Past Medical History:   Diagnosis Date    Arthritis     hands    Bilateral bunions     Borderline glaucoma     De Quervain's disease (radial styloid tenosynovitis)     Gastritis     upper GI 2/2017    Hydradenitis     Hyperlipidemia     Hypertension     Insomnia     Migraines 02/01/2000    Nasal septum perforation     Obesity     Pneumonia     Restrictive airway disease     Sleep apnea     SVT (supraventricular tachycardia) 09/2013    Trigger finger     Type 2 diabetes mellitus 2012     am 02/01/2024       Past Surgical History:   Procedure Laterality Date    AXILLARY HIDRADENITIS EXCISION Bilateral     BONE EXOSTOSIS  EXCISION Right 2018    Procedure: EXCISION, EXOSTOSIS;  Surgeon: Jayro Pedraza Sr., MD;  Location: [facility];  Service: Orthopedics;  Laterality: Right;    BREAST BIOPSY Bilateral     both benign    BREAST SURGERY  1998    CARPAL TUNNEL RELEASE      bilateral    CARPAL TUNNEL RELEASE Right 2024    Procedure: RELEASE, CARPAL TUNNEL;  Surgeon: Jaime Oswald MD;  Location: [facility] OR;  Service: Orthopedics;  Laterality: Right;    CARPAL TUNNEL RELEASE Left 2024    Procedure: RELEASE, CARPAL TUNNEL;  Surgeon: Jaime Oswald MD;  Location: [facility];  Service: Orthopedics;  Laterality: Left;    CATARACT EXTRACTION Bilateral     OU     SECTION  1979    CHOLECYSTECTOMY  2014    COLONOSCOPY N/A 10/02/2020    Procedure: COLONOSCOPY;  Surgeon: Tushar Edwards MD;  Location: [facility];  Service: Endoscopy;  Laterality: N/A;    COLONOSCOPY W/ POLYPECTOMY  10/02/2020    Polyps x3, repeat 5 years; Tushar Edwards MD     CYST REMOVAL  2015    sebaceous cyst removed from face    DE QUERVAIN'S RELEASE Left 2020    Procedure: RELEASE, HAND, FOR DEQUERVAIN'S TENOSYNOVITIS;  Surgeon: Jaime Oswald MD;  Location: [facility] OR;  Service: Orthopedics;  Laterality: Left;    DE QUERVAIN'S RELEASE Right 2020    Procedure: RELEASE, HAND, FOR DEQUERVAIN'S TENOSYNOVITIS;  Surgeon: Jaime Oswald MD;  Location: [facility];  Service: Orthopedics;  Laterality: Right;    ENDOBRONCHIAL ULTRASOUND Bilateral 04/10/2024    Procedure: ENDOBRONCHIAL ULTRASOUND (EBUS);  Surgeon: Adrian Robin MD;  Location: [facility];  Service: Pulmonary;  Laterality: Bilateral;    EYE SURGERY      gastric sleeve  2017    Dr. Watson    INJECTION OF ANESTHETIC AGENT AROUND MULTIPLE INTERCOSTAL NERVES  5/10/2024    Procedure: BLOCK, NERVE, INTERCOSTAL, 2 OR MORE;  Surgeon: Mike Nuñez MD;  Location: [facility];  Service: Cardiothoracic;;    KNEE SURGERY Right     OLECRANON BURSECTOMY Right  2018    Procedure: BURSECTOMY, OLECRANON;  Surgeon: Jayro Pedraza Sr., MD;  Location: [facility] OR;  Service: Orthopedics;  Laterality: Right;    SURGICAL REMOVAL OF BUNION WITH OSTEOTOMY OF METATARSAL BONE Left 05/10/2019    Procedure: BUNIONECTOMY, WITH METATARSAL OSTEOTOMY;  Surgeon: Srinivasan Villanueva DPM;  Location: [facility] OR;  Service: Podiatry;  Laterality: Left;    SURGICAL REMOVAL OF BUNION WITH OSTEOTOMY OF METATARSAL BONE Right 2019    Procedure: BUNIONECTOMY, WITH METATARSAL OSTEOTOMY;  Surgeon: Srinivasan Villanueva DPM;  Location: [facility] OR;  Service: Podiatry;  Laterality: Right;    SURGICAL REMOVAL OF LYMPH NODE Left 5/10/2024    Procedure: EXCISION, LYMPH NODE;  Surgeon: Mike Nuñez MD;  Location: [facility] OR;  Service: Cardiothoracic;  Laterality: Left;    TONSILLECTOMY, ADENOIDECTOMY  1980s    TRANSESOPHAGEAL ECHOCARDIOGRAM WITH POSSIBLE CARDIOVERSION (LAKESHIA W/ POSS CARDIOVERSION) N/A 5/15/2024    Procedure: Transesophageal echo (LAKESHIA) intra-procedure log documentation;  Surgeon: Twan Pelaez MD;  Location: [facility] CATH LAB;  Service: Cardiology;  Laterality: N/A;    TRIGGER FINGER RELEASE Right 2015    Dr. Milo HALL ROBOTIC RATS,WITH LOBECTOMY,LUNG Left 5/10/2024    Procedure: XI ROBOTIC RATS,WITH LOBECTOMY,LUNG;  Surgeon: Mike Nuñez MD;  Location: [facility] OR;  Service: Cardiothoracic;  Laterality: Left;  Lingulectomy       Social History     Tobacco Use    Smoking status: Former     Current packs/day: 0.00     Average packs/day: 0.5 packs/day for 42.0 years (21.0 ttl pk-yrs)     Types: Cigarettes     Start date: 1970     Quit date: 2012     Years since quittin.4     Passive exposure: Past    Smokeless tobacco: Never   Substance Use Topics    Alcohol use: Not Currently     Alcohol/week: 1.0 standard drink of alcohol     Types: 1 Glasses of wine per week     Comment: Glass red wine once a every 2 weeks    Drug use: Never       Family History   Problem Relation Name Age of Onset     Prostate cancer Brother      Diabetes Maternal Aunt Alondra Campos     Diabetes Cousin      Hypertension Maternal Grandmother Portia TSAI    Objective:   Physical Exam  HENT:      Head: Normocephalic.   Eyes:      Pupils: Pupils are equal, round, and reactive to light.   Neck:      Thyroid: No thyromegaly.      Vascular: Normal carotid pulses. No carotid bruit or JVD.   Cardiovascular:      Rate and Rhythm: Normal rate and regular rhythm. No extrasystoles are present.     Chest Wall: PMI is not displaced.      Pulses: Normal pulses.      Heart sounds: Normal heart sounds. No murmur heard.     No gallop. No S3 sounds.   Pulmonary:      Effort: No respiratory distress.      Breath sounds: Normal breath sounds. No stridor.   Abdominal:      General: Bowel sounds are normal.      Palpations: Abdomen is soft.      Tenderness: There is no abdominal tenderness. There is no rebound.   Skin:     Findings: No rash.   Neurological:      Mental Status: She is alert and oriented to person, place, and time.   Psychiatric:         Behavior: Behavior normal.         Lab Results   Component Value Date    CHOL 184 12/26/2023    CHOL 190 09/12/2022    CHOL 208 (H) 04/13/2022     Lab Results   Component Value Date    HDL 48 12/26/2023    HDL 39 (L) 09/12/2022    HDL 37 (L) 04/13/2022     Lab Results   Component Value Date    LDLCALC 110.4 12/26/2023    LDLCALC 114.6 09/12/2022    LDLCALC 129.0 04/13/2022     Lab Results   Component Value Date    TRIG 128 12/26/2023    TRIG 182 (H) 09/12/2022    TRIG 210 (H) 04/13/2022     Lab Results   Component Value Date    CHOLHDL 26.1 12/26/2023    CHOLHDL 20.5 09/12/2022    CHOLHDL 17.8 (L) 04/13/2022       Chemistry        Component Value Date/Time     05/15/2024 0309    K 3.5 05/15/2024 0309     05/15/2024 0309    CO2 26 05/15/2024 0309    BUN 7 (L) 05/15/2024 0309    CREATININE 0.6 05/15/2024 0309     (H) 05/15/2024 0309        Component Value Date/Time     CALCIUM 9.2 05/15/2024 0309    ALKPHOS 108 04/01/2024 1211    AST 31 04/01/2024 1211    ALT 53 (H) 04/01/2024 1211    BILITOT 0.3 04/01/2024 1211    ESTGFRAFRICA >60 06/21/2022 0935    EGFRNONAA >60 06/21/2022 0935          Lab Results   Component Value Date    HGBA1C 5.4 05/11/2024     Lab Results   Component Value Date    TSH 3.220 09/12/2022     Lab Results   Component Value Date    INR 1.1 05/14/2024    INR 0.9 04/04/2024    INR 1.0 06/21/2022     Lab Results   Component Value Date    WBC 6.59 05/15/2024    WBC 6.59 05/15/2024    HGB 11.6 (L) 05/15/2024    HGB 11.6 (L) 05/15/2024    HCT 33.6 (L) 05/15/2024    HCT 33.6 (L) 05/15/2024    MCV 75 (L) 05/15/2024    MCV 75 (L) 05/15/2024     05/15/2024     05/15/2024     BMP  Sodium   Date Value Ref Range Status   05/15/2024 138 136 - 145 mmol/L Final     Potassium   Date Value Ref Range Status   05/15/2024 3.5 3.5 - 5.1 mmol/L Final     Chloride   Date Value Ref Range Status   05/15/2024 104 95 - 110 mmol/L Final     CO2   Date Value Ref Range Status   05/15/2024 26 23 - 29 mmol/L Final     BUN   Date Value Ref Range Status   05/15/2024 7 (L) 8 - 23 mg/dL Final     Creatinine   Date Value Ref Range Status   05/15/2024 0.6 0.5 - 1.4 mg/dL Final     Calcium   Date Value Ref Range Status   05/15/2024 9.2 8.7 - 10.5 mg/dL Final     Anion Gap   Date Value Ref Range Status   05/15/2024 8 8 - 16 mmol/L Final     eGFR if    Date Value Ref Range Status   06/21/2022 >60 >60 mL/min/1.73 m^2 Final     eGFR if non    Date Value Ref Range Status   06/21/2022 >60 >60 mL/min/1.73 m^2 Final     Comment:     Calculation used to obtain the estimated glomerular filtration  rate (eGFR) is the CKD-EPI equation.        BNP  @LABRCNTIP(BNP,BNPTRIAGEBLO)@  @LABRCNTIP(troponini)@  CrCl cannot be calculated (Patient's most recent lab result is older than the maximum 7 days allowed.).  No results found in the last 24 hours.  No results found in the  last 24 hours.  No results found in the last 24 hours.    Assessment:      1. Typical atrial flutter    2. Solitary pulmonary nodule    3. Aortic atherosclerosis    4. Hyperlipidemia associated with type 2 diabetes mellitus    5. Paroxysmal SVT (supraventricular tachycardia)    6. Primary hypertension    7. Secondary hypertension    8. Malignant neoplasm of lower lobe of left lung        Plan:   VILTALS in 2 months  If no afl, ok to stop ELIQUIS.  Continue Eliquis 5 mg bid losartan and metoprolol    Counseled DASH  Check Lipid profile with PCP in 6 months  Recommend heart-healthy diet, weight control and regular exercise.  Adrian. Risk modification.   I have reviewed all pertinent labs and cardiac studies independently. Plans and recommendations have been formulated under my direct supervision. All questions answered and patient voiced understanding.   If symptoms persist go to the ED  RTC in 6 months

## 2024-06-25 LAB
OHS QRS DURATION: 80 MS
OHS QTC CALCULATION: 435 MS

## 2024-06-26 ENCOUNTER — OFFICE VISIT (OUTPATIENT)
Dept: SPORTS MEDICINE | Facility: CLINIC | Age: 74
End: 2024-06-26
Payer: MEDICARE

## 2024-06-26 DIAGNOSIS — M17.0 PRIMARY OSTEOARTHRITIS OF BOTH KNEES: ICD-10-CM

## 2024-06-26 DIAGNOSIS — M67.912 TENDINOPATHY OF ROTATOR CUFF, LEFT: Primary | ICD-10-CM

## 2024-06-26 DIAGNOSIS — G89.29 CHRONIC PAIN OF LEFT KNEE: ICD-10-CM

## 2024-06-26 DIAGNOSIS — M25.562 CHRONIC PAIN OF LEFT KNEE: ICD-10-CM

## 2024-06-26 PROCEDURE — 99999 PR PBB SHADOW E&M-EST. PATIENT-LVL II: CPT | Mod: PBBFAC,,, | Performed by: STUDENT IN AN ORGANIZED HEALTH CARE EDUCATION/TRAINING PROGRAM

## 2024-06-26 PROCEDURE — 1159F MED LIST DOCD IN RCRD: CPT | Mod: CPTII,S$GLB,, | Performed by: STUDENT IN AN ORGANIZED HEALTH CARE EDUCATION/TRAINING PROGRAM

## 2024-06-26 PROCEDURE — 3044F HG A1C LEVEL LT 7.0%: CPT | Mod: CPTII,S$GLB,, | Performed by: STUDENT IN AN ORGANIZED HEALTH CARE EDUCATION/TRAINING PROGRAM

## 2024-06-26 PROCEDURE — 20611 DRAIN/INJ JOINT/BURSA W/US: CPT | Mod: LT,S$GLB,, | Performed by: STUDENT IN AN ORGANIZED HEALTH CARE EDUCATION/TRAINING PROGRAM

## 2024-06-26 PROCEDURE — 1160F RVW MEDS BY RX/DR IN RCRD: CPT | Mod: CPTII,S$GLB,, | Performed by: STUDENT IN AN ORGANIZED HEALTH CARE EDUCATION/TRAINING PROGRAM

## 2024-06-26 PROCEDURE — 99214 OFFICE O/P EST MOD 30 MIN: CPT | Mod: 25,S$GLB,, | Performed by: STUDENT IN AN ORGANIZED HEALTH CARE EDUCATION/TRAINING PROGRAM

## 2024-06-26 PROCEDURE — 4010F ACE/ARB THERAPY RXD/TAKEN: CPT | Mod: CPTII,S$GLB,, | Performed by: STUDENT IN AN ORGANIZED HEALTH CARE EDUCATION/TRAINING PROGRAM

## 2024-06-26 RX ORDER — MELOXICAM 15 MG/1
15 TABLET ORAL DAILY
Qty: 30 TABLET | Refills: 1 | Status: SHIPPED | OUTPATIENT
Start: 2024-06-26

## 2024-06-26 RX ORDER — TRIAMCINOLONE ACETONIDE 40 MG/ML
40 INJECTION, SUSPENSION INTRA-ARTICULAR; INTRAMUSCULAR
Status: DISCONTINUED | OUTPATIENT
Start: 2024-06-26 | End: 2024-06-26 | Stop reason: HOSPADM

## 2024-06-26 RX ADMIN — TRIAMCINOLONE ACETONIDE 40 MG: 40 INJECTION, SUSPENSION INTRA-ARTICULAR; INTRAMUSCULAR at 10:06

## 2024-06-26 NOTE — PROGRESS NOTES
Patient ID: Cassandra Campos  YOB: 1950  MRN: [id number]    Chief Complaint: left knee and left shoulder pain      History of Present Illness: Cassandra Campos is a right-hand dominant 73 y.o. female who presents today with left knee and left shoulder pain.   Patient notes pain is at worst a 10/10 at times affecting her activity of daily living and thus her quality of life.  She has not using any devices to assist with ambulation nor is she wearing any knee braces.  She states she takes Tylenol as needed to help with the pain .  She states she has having a hard time walking long distances, going from a sitting to standing or standing to seated position, unlevel terrain or stairs.  She also complains of left shoulder pain and dysfunction that had started 3-4 weeks . Her symptoms included pain and lack of range of motion. Her pain was aggravated by overhead movement, reaching movements. She had tried rest, activity modification, Muscle relaxer, ibuprofen, heat and ice.           Past Medical History:   Past Medical History:   Diagnosis Date    Arthritis     hands    Bilateral bunions     Borderline glaucoma     De Quervain's disease (radial styloid tenosynovitis)     Gastritis     upper GI 2/2017    Hydradenitis     Hyperlipidemia     Hypertension     Insomnia     Migraines 02/01/2000    Nasal septum perforation     Obesity     Pneumonia     Restrictive airway disease     Sleep apnea     SVT (supraventricular tachycardia) 09/2013    Trigger finger     Type 2 diabetes mellitus 2012     am 02/01/2024     Past Surgical History:   Procedure Laterality Date    AXILLARY HIDRADENITIS EXCISION Bilateral     BONE EXOSTOSIS EXCISION Right 07/25/2018    Procedure: EXCISION, EXOSTOSIS;  Surgeon: Jayro Pedraza Sr., MD;  Location: [facility];  Service: Orthopedics;  Laterality: Right;    BREAST BIOPSY Bilateral     both benign    BREAST SURGERY  07/1998    CARPAL TUNNEL RELEASE      bilateral    CARPAL  TUNNEL RELEASE Right 2024    Procedure: RELEASE, CARPAL TUNNEL;  Surgeon: Jaime Oswald MD;  Location: [facility];  Service: Orthopedics;  Laterality: Right;    CARPAL TUNNEL RELEASE Left 2024    Procedure: RELEASE, CARPAL TUNNEL;  Surgeon: Jaime Oswald MD;  Location: [facility];  Service: Orthopedics;  Laterality: Left;    CATARACT EXTRACTION Bilateral     OU     SECTION  1979    CHOLECYSTECTOMY  2014    COLONOSCOPY N/A 10/02/2020    Procedure: COLONOSCOPY;  Surgeon: Tushar Edwards MD;  Location: [facility];  Service: Endoscopy;  Laterality: N/A;    COLONOSCOPY W/ POLYPECTOMY  10/02/2020    Polyps x3, repeat 5 years; Tushar Edwards MD     CYST REMOVAL  2015    sebaceous cyst removed from face    DE QUERVAIN'S RELEASE Left 2020    Procedure: RELEASE, HAND, FOR DEQUERVAIN'S TENOSYNOVITIS;  Surgeon: Jaime Oswald MD;  Location: [facility] OR;  Service: Orthopedics;  Laterality: Left;    DE QUERVAIN'S RELEASE Right 2020    Procedure: RELEASE, HAND, FOR DEQUERVAIN'S TENOSYNOVITIS;  Surgeon: Jaime Oswald MD;  Location: [facility];  Service: Orthopedics;  Laterality: Right;    ENDOBRONCHIAL ULTRASOUND Bilateral 04/10/2024    Procedure: ENDOBRONCHIAL ULTRASOUND (EBUS);  Surgeon: Adrian Robin MD;  Location: [facility];  Service: Pulmonary;  Laterality: Bilateral;    EYE SURGERY      gastric sleeve  2017    Dr. Watson    INJECTION OF ANESTHETIC AGENT AROUND MULTIPLE INTERCOSTAL NERVES  5/10/2024    Procedure: BLOCK, NERVE, INTERCOSTAL, 2 OR MORE;  Surgeon: Mike Nuñez MD;  Location: [facility];  Service: Cardiothoracic;;    KNEE SURGERY Right     OLECRANON BURSECTOMY Right 2018    Procedure: BURSECTOMY, OLECRANON;  Surgeon: Jayro Pedraza Sr., MD;  Location: [facility];  Service: Orthopedics;  Laterality: Right;    SURGICAL REMOVAL OF BUNION WITH OSTEOTOMY OF METATARSAL BONE Left 05/10/2019    Procedure: BUNIONECTOMY, WITH METATARSAL OSTEOTOMY;   Surgeon: Srinivasan Villanueva DPM;  Location: [facility] OR;  Service: Podiatry;  Laterality: Left;    SURGICAL REMOVAL OF BUNION WITH OSTEOTOMY OF METATARSAL BONE Right 2019    Procedure: BUNIONECTOMY, WITH METATARSAL OSTEOTOMY;  Surgeon: Srinivasan Villanueva DPM;  Location: [facility] OR;  Service: Podiatry;  Laterality: Right;    SURGICAL REMOVAL OF LYMPH NODE Left 5/10/2024    Procedure: EXCISION, LYMPH NODE;  Surgeon: Mike Nuñez MD;  Location: [facility] OR;  Service: Cardiothoracic;  Laterality: Left;    TONSILLECTOMY, ADENOIDECTOMY  1980s    TRANSESOPHAGEAL ECHOCARDIOGRAM WITH POSSIBLE CARDIOVERSION (LAKESHIA W/ POSS CARDIOVERSION) N/A 5/15/2024    Procedure: Transesophageal echo (LAKESHIA) intra-procedure log documentation;  Surgeon: Twan Pelaez MD;  Location: [facility] CATH LAB;  Service: Cardiology;  Laterality: N/A;    TRIGGER FINGER RELEASE Right 2015    Dr. Milo HALL ROBOTIC RATS,WITH LOBECTOMY,LUNG Left 5/10/2024    Procedure: XI ROBOTIC RATS,WITH LOBECTOMY,LUNG;  Surgeon: Mike Nuñez MD;  Location: [facility] OR;  Service: Cardiothoracic;  Laterality: Left;  Lingulectomy     Family History   Problem Relation Name Age of Onset    Prostate cancer Brother      Diabetes Maternal Aunt Alondra Campos     Diabetes Cousin      Hypertension Maternal Grandmother Portia Orosco      Social History     Socioeconomic History    Marital status:     Number of children: 1   Occupational History    Occupation:  aid   Tobacco Use    Smoking status: Former     Current packs/day: 0.00     Average packs/day: 0.5 packs/day for 42.0 years (21.0 ttl pk-yrs)     Types: Cigarettes     Start date: 1970     Quit date: 2012     Years since quittin.4     Passive exposure: Past    Smokeless tobacco: Never   Substance and Sexual Activity    Alcohol use: Not Currently     Alcohol/week: 1.0 standard drink of alcohol     Types: 1 Glasses of wine per week     Comment: Glass red wine once a every 2 weeks    Drug use:  Never    Sexual activity: Not Currently     Partners: Female     Birth control/protection: Abstinence, None   Social History Narrative    Single, part-time teacher. Masters degree biology.      Social Determinants of Health     Financial Resource Strain: Low Risk  (12/19/2023)    Overall Financial Resource Strain (CARDIA)     Difficulty of Paying Living Expenses: Not hard at all   Food Insecurity: Food Insecurity Present (12/19/2023)    Hunger Vital Sign     Worried About Running Out of Food in the Last Year: Never true     Ran Out of Food in the Last Year: Sometimes true   Transportation Needs: No Transportation Needs (12/19/2023)    PRAPARE - Transportation     Lack of Transportation (Medical): No     Lack of Transportation (Non-Medical): No   Physical Activity: Insufficiently Active (12/19/2023)    Exercise Vital Sign     Days of Exercise per Week: 3 days     Minutes of Exercise per Session: 20 min   Stress: No Stress Concern Present (12/19/2023)    [employer] - Occupational Stress Questionnaire     Feeling of Stress : Not at all   Housing Stability: Low Risk  (12/19/2023)    Housing Stability Vital Sign     Unable to Pay for Housing in the Last Year: No     Number of Places Lived in the Last Year: 1     Unstable Housing in the Last Year: No     Medication List with Changes/Refills   New Medications    MELOXICAM (MOBIC) 15 MG TABLET    Take 1 tablet (15 mg total) by mouth once daily. Take 1 tab daily for 14 days, then daily as needed.   Current Medications    ALBUTEROL (PROVENTIL/VENTOLIN HFA) 90 MCG/ACTUATION INHALER    INHALE 2 PUFFS INTO THE LUNGS EVERY 4 HOURS AS NEEDED FOR WHEEZING OR SHORTNESS OF BREATH.    AMITRIPTYLINE (ELAVIL) 100 MG TABLET    Take 1 tablet (100 mg total) by mouth every evening.    APIXABAN (ELIQUIS) 5 MG TAB    Take 1 tablet (5 mg total) by mouth 2 (two) times daily.    BLOOD-GLUCOSE METER (ACCU-CHEK GUIDE ME GLUCOSE MTR) MISC    use to check blood glucose  2 times a day    BLOOD-GLUCOSE SENSOR (DEXCOM G7 SENSOR) LUISA    1 each by Misc.(Non-Drug; Combo Route) route continuous.    ESZOPICLONE (LUNESTA) 3 MG TAB    Take 1 tablet (3 mg total) by mouth every evening.    GUAIFENESIN-CODEINE 100-10 MG/5 ML (TUSSI-ORGANIDIN NR)  MG/5 ML SYRUP    Take 5 mLs by mouth 3 (three) times daily.    LIDOCAINE (LIDODERM) 5 %    Place 1 patch onto the skin once daily. Remove & Discard patch within 12 hours or as directed by MD    LORAZEPAM (ATIVAN) 1 MG TABLET    Take 1 tablet (1 mg total) by mouth 2 (two) times daily.    LOSARTAN (COZAAR) 25 MG TABLET    TAKE 1 TABLET BY MOUTH ONCE  DAILY    MELOXICAM (MOBIC) 15 MG TABLET    Take 1 tab daily for 14 days, then daily as needed.    METOPROLOL SUCCINATE (TOPROL-XL) 50 MG 24 HR TABLET    TAKE 1 TABLET BY MOUTH ONCE  DAILY    OMEPRAZOLE (PRILOSEC) 20 MG CAPSULE    TAKE 1 CAPSULE BY MOUTH ONCE  DAILY    SEMAGLUTIDE (OZEMPIC) 2 MG/DOSE (8 MG/3 ML) PNIJ    Inject 2 mg into the skin every 7 days.     Review of patient's allergies indicates:  No Known Allergies    Physical Exam:   There is no height or weight on file to calculate BMI.    GENERAL: Well appearing, in no acute distress.  HEAD: Normocephalic and atraumatic.  ENT: External ears and nose grossly normal.  EYES: EOMI bilaterally  PULMONARY: Respirations are grossly even and non-labored.  NEURO: Awake, alert, and oriented x 3.  SKIN: No obvious rashes appreciated.  PSYCH: Mood & affect are appropriate.    Detailed MSK exam:     Left shoulder exam:   -ROM: abduction 100, forward flexion 100, external rotation 70, internal rotation 60  -empty can test pain but no weakness, resisted ER negative, belly press pain but no weakness  -landaverde test positive, neers test positive, whipple test positive  -biceps load test negative, yerguson test negative, Guthrie's test negative  -sensation intact, pulses 2+  -TTP: lateral cuff insertion     Left knee exam:   -ROM: extension 0, flexion  120  -TTP: Medial joint line  -effusion: none  -Patellar apprehension negative  -Thor test negative  -stable to varus and valgus stress tests  -Lachman test negative, anterior drawer test negative, posterior drawer test negative    Imaging:  Sports Medicine US - Guidance for Needle Placement  Naman Barton MD     6/12/2024 10:27 AM  Sports Medicine US - Guidance for Needle Placement    Date/Time: 6/12/2024 9:40 AM    Performed by: Naman Barton MD  Authorized by: Naman Barton MD  Preparation: Patient was prepped and   draped in the usual sterile fashion.  Local anesthesia used: no    Anesthesia:  Local anesthesia used: no    Sedation:  Patient sedated: no    Patient tolerance: patient tolerated the procedure well with no immediate   complications  Comments: Ultrasound guidance was used for needle localization. Images   were saved and stored for documentation. The appropriate structures were   visualized. Dynamic visualization of the needle was continuous throughout   the procedures and maintained good position.         Relevant imaging results were reviewed and interpreted by me and per my read shows mild arthritic changes bilateral knees.  This was discussed with the patient and / or family today.     Assessment:  Cassandra Campos is a 73 y.o. female following up for left shoulder and left knee pain. Interested in steroid injections today.   Plan: Steroid injection given today (see separate procedure note for details). We discussed the proper protocols after the injection such as no submerging pools, baths tubs, or hot tubs for 24 hr.  Showering is okay today.  We also discussed that blood sugars can be elevated after an injection and asked patient to properly checked her sugars over the next few days and contact their PCP if there are any concerns.  We discussed red flags such as fevers, chills, red, warm, tender joint at the area of injection to please seek medical care immediately.    Continue conservative management for pain. Consider gel injection if not improving.   Follow up as needed. All questions answered.     Tendinopathy of rotator cuff, left  -     Sports Medicine US - Guidance for Needle Placement  -     meloxicam (MOBIC) 15 MG tablet; Take 1 tablet (15 mg total) by mouth once daily. Take 1 tab daily for 14 days, then daily as needed.  Dispense: 30 tablet; Refill: 1  -     Large Joint Aspiration/Injection: L subacromial bursa    Chronic pain of left knee  -     Sports Medicine US - Guidance for Needle Placement  -     meloxicam (MOBIC) 15 MG tablet; Take 1 tablet (15 mg total) by mouth once daily. Take 1 tab daily for 14 days, then daily as needed.  Dispense: 30 tablet; Refill: 1  -     Large Joint Aspiration/Injection: L knee    Primary osteoarthritis of both knees  -     meloxicam (MOBIC) 15 MG tablet; Take 1 tablet (15 mg total) by mouth once daily. Take 1 tab daily for 14 days, then daily as needed.  Dispense: 30 tablet; Refill: 1  -     Large Joint Aspiration/Injection: L knee         Ultrasound guidance was used for needle localization. Images were saved and stored for documentation. The appropriate structures were visualized. Dynamic visualization of the needle was continuous throughout the procedures and maintained good position.      MEDICAL NECESSITY FOR VISCOSUPPLEMENTATION: After thorough evaluation of the patient, I have determined that visco-supplementation is medically necessary. The patient has painful degenerative changes of the knee with failure of conservative treatments including lifestyle modifications and rehabilitation exercises.  Oral analgesis/NSAIDs have not adequately controlled symptoms and there is radiographic evidence of Kellgren Sanchez grade 2 or greater osteoarthritic changes, or in lack of radiographic evidence, there is arthroscopic or other evidence of chondrosis.     Electronically signed:  Naman Barton MD, MPH  06/26/2024  10:57 AM

## 2024-06-26 NOTE — PROCEDURES
Large Joint Aspiration/Injection: L subacromial bursa    Date/Time: 6/26/2024 10:40 AM    Performed by: Naman Barton MD  Authorized by: Naman Barton MD    Consent Done?:  Yes (Verbal)  Indications:  Pain  Site marked: the procedure site was marked    Timeout: prior to procedure the correct patient, procedure, and site was verified    Prep: patient was prepped and draped in usual sterile fashion    Local anesthetic:  Bupivacaine 0.5% without epinephrine and lidocaine 1% without epinephrine    Details:  Needle Size:  21 G  Ultrasonic Guidance for needle placement?: Yes    Images are saved and documented.  Approach:  Lateral  Location:  Shoulder  Site:  L subacromial bursa  Medications:  40 mg triamcinolone acetonide 40 mg/mL  Patient tolerance:  Patient tolerated the procedure well with no immediate complications     Ultrasound guidance was used for needle localization. Images were saved and stored for documentation. The appropriate structures were visualized. Dynamic visualization of the needle was continuous throughout the procedures and maintained good position.

## 2024-06-26 NOTE — PROCEDURES
Large Joint Aspiration/Injection: L knee    Date/Time: 6/26/2024 10:40 AM    Performed by: Naman Barton MD  Authorized by: Naman Barton MD    Consent Done?:  Yes (Verbal)  Indications:  Arthritis and pain  Site marked: the procedure site was marked    Timeout: prior to procedure the correct patient, procedure, and site was verified    Prep: patient was prepped and draped in usual sterile fashion    Local anesthetic:  Bupivacaine 0.5% without epinephrine and lidocaine 1% without epinephrine    Details:  Needle Size:  21 G  Ultrasonic Guidance for needle placement?: Yes    Images are saved and documented.  Approach:  Lateral (superior)  Location:  Knee  Site:  L knee  Medications:  40 mg triamcinolone acetonide 40 mg/mL  Patient tolerance:  Patient tolerated the procedure well with no immediate complications     Ultrasound guidance was used for needle localization. Images were saved and stored for documentation. The appropriate structures were visualized. Dynamic visualization of the needle was continuous throughout the procedures and maintained good position.

## 2024-06-26 NOTE — PROCEDURES
Sports Medicine US - Guidance for Needle Placement    Date/Time: 6/26/2024 10:40 AM    Performed by: Naman Barton MD  Authorized by: Naman Barton MD  Preparation: Patient was prepped and draped in the usual sterile fashion.  Local anesthesia used: no    Anesthesia:  Local anesthesia used: no    Sedation:  Patient sedated: no    Patient tolerance: patient tolerated the procedure well with no immediate complications  Comments: Ultrasound guidance was used for needle localization. Images were saved and stored for documentation. The appropriate structures were visualized. Dynamic visualization of the needle was continuous throughout the procedures and maintained good position.

## 2024-06-26 NOTE — PATIENT INSTRUCTIONS
Assessment:  Cassandra Campos is a 73 y.o. female No chief complaint on file.      Encounter Diagnoses   Name Primary?    Tendinopathy of rotator cuff, left Yes    Chronic pain of left knee         Plan:  Ultrasound guided cortisone injection to the left knee  Ultrasound guided subacromial cortisone injection to the left shoulder  We discussed the proper protocols after the injection such as no submerging pools, baths tubs, or hot tubs for 24 hr.  Showering is okay today.  We also discussed that blood sugars can be elevated after an injection and asked patient to properly checked her sugars over the next few days and contact their PCP if there are any concerns.  We discussed red flags such as fevers, chills, red, warm, tender joint at the area of injection to please seek medical care immediately.    Follow up as needed    Follow-up: as needed.    Thank you for choosing [facility] and Dr. Naman Barton for your orthopedic & sports medicine care. It is our goal to provide you with exceptional care that will help keep you healthy, active, and get you back in the game.    Please do not hesitate to reach out to us via email, phone, or MyChart with any questions, concerns, or feedback.    If you felt that you received exemplary care today, please consider leaving us feedback on [employer] at:  [email or web address]    If you are experiencing pain/discomfort ,or have questions after 5pm and would like to be connected to the [facility] on-call team, please call this number and specify which Sports Medicine provider is treating you: [phone]

## 2024-06-28 ENCOUNTER — OFFICE VISIT (OUTPATIENT)
Dept: RADIATION ONCOLOGY | Facility: CLINIC | Age: 74
End: 2024-06-28
Payer: MEDICARE

## 2024-06-28 VITALS
RESPIRATION RATE: 18 BRPM | DIASTOLIC BLOOD PRESSURE: 82 MMHG | BODY MASS INDEX: 29.8 KG/M2 | TEMPERATURE: 98 F | HEIGHT: 63 IN | WEIGHT: 168.19 LBS | SYSTOLIC BLOOD PRESSURE: 124 MMHG | HEART RATE: 96 BPM | OXYGEN SATURATION: 99 %

## 2024-06-28 DIAGNOSIS — C34.32 MALIGNANT NEOPLASM OF LOWER LOBE OF LEFT LUNG: ICD-10-CM

## 2024-06-28 PROCEDURE — 99999 PR PBB SHADOW E&M-EST. PATIENT-LVL V: CPT | Mod: PBBFAC,,, | Performed by: SPECIALIST

## 2024-07-01 ENCOUNTER — PATIENT MESSAGE (OUTPATIENT)
Dept: RADIATION ONCOLOGY | Facility: CLINIC | Age: 74
End: 2024-07-01
Payer: MEDICARE

## 2024-07-06 ENCOUNTER — PATIENT MESSAGE (OUTPATIENT)
Dept: INTERNAL MEDICINE | Facility: CLINIC | Age: 74
End: 2024-07-06
Payer: MEDICARE

## 2024-07-09 ENCOUNTER — PATIENT MESSAGE (OUTPATIENT)
Dept: INTERNAL MEDICINE | Facility: CLINIC | Age: 74
End: 2024-07-09
Payer: MEDICARE

## 2024-07-11 DIAGNOSIS — I15.9 SECONDARY HYPERTENSION: ICD-10-CM

## 2024-07-11 RX ORDER — OMEPRAZOLE 20 MG/1
20 CAPSULE, DELAYED RELEASE ORAL
Qty: 90 CAPSULE | Refills: 3 | Status: SHIPPED | OUTPATIENT
Start: 2024-07-11

## 2024-07-11 RX ORDER — METOPROLOL SUCCINATE 50 MG/1
50 TABLET, EXTENDED RELEASE ORAL
Qty: 90 TABLET | Refills: 3 | Status: SHIPPED | OUTPATIENT
Start: 2024-07-11

## 2024-07-12 NOTE — TELEPHONE ENCOUNTER
No care due was identified.  [facility] Embedded Care Due Messages. Reference number: [id number].   7/11/2024 9:54:08 PM CDT

## 2024-07-12 NOTE — TELEPHONE ENCOUNTER
Refill Decision Note   Cassandra Campos  is requesting a refill authorization.  Brief Assessment and Rationale for Refill:  Approve     Medication Therapy Plan:        Comments:     Note composed:10:50 PM 07/11/2024

## 2024-07-15 ENCOUNTER — PATIENT MESSAGE (OUTPATIENT)
Dept: ADMINISTRATIVE | Facility: HOSPITAL | Age: 74
End: 2024-07-15
Payer: MEDICARE

## 2024-07-17 ENCOUNTER — TELEPHONE (OUTPATIENT)
Dept: SPORTS MEDICINE | Facility: CLINIC | Age: 74
End: 2024-07-17
Payer: MEDICARE

## 2024-07-17 ENCOUNTER — PATIENT MESSAGE (OUTPATIENT)
Dept: INTERNAL MEDICINE | Facility: CLINIC | Age: 74
End: 2024-07-17
Payer: MEDICARE

## 2024-07-17 ENCOUNTER — PATIENT MESSAGE (OUTPATIENT)
Dept: SPORTS MEDICINE | Facility: CLINIC | Age: 74
End: 2024-07-17
Payer: MEDICARE

## 2024-07-17 NOTE — TELEPHONE ENCOUNTER
No care due was identified.  [facility] Embedded Care Due Messages. Reference number: [id number].   7/17/2024 4:46:21 PM CDT

## 2024-07-17 NOTE — TELEPHONE ENCOUNTER
Called and scheduled pt follow up appt with xray.  Offered next day but patient didn't have transportation.  Scheduled for 7/24.  Pt verbalized understanding of appt date, time and location.

## 2024-07-18 ENCOUNTER — TELEPHONE (OUTPATIENT)
Dept: PAIN MEDICINE | Facility: CLINIC | Age: 74
End: 2024-07-18
Payer: MEDICARE

## 2024-07-18 ENCOUNTER — HOSPITAL ENCOUNTER (OUTPATIENT)
Dept: RADIOLOGY | Facility: HOSPITAL | Age: 74
Discharge: HOME | End: 2024-07-18
Attending: STUDENT IN AN ORGANIZED HEALTH CARE EDUCATION/TRAINING PROGRAM
Payer: MEDICARE

## 2024-07-18 ENCOUNTER — OFFICE VISIT (OUTPATIENT)
Dept: SPORTS MEDICINE | Facility: CLINIC | Age: 74
End: 2024-07-18
Payer: MEDICARE

## 2024-07-18 VITALS — WEIGHT: 168.19 LBS | BODY MASS INDEX: 29.8 KG/M2 | HEIGHT: 63 IN

## 2024-07-18 DIAGNOSIS — M17.0 PRIMARY OSTEOARTHRITIS OF BOTH KNEES: Primary | ICD-10-CM

## 2024-07-18 DIAGNOSIS — M25.561 ACUTE PAIN OF RIGHT KNEE: ICD-10-CM

## 2024-07-18 DIAGNOSIS — M25.561 ACUTE PAIN OF RIGHT KNEE: Primary | ICD-10-CM

## 2024-07-18 PROCEDURE — 1159F MED LIST DOCD IN RCRD: CPT | Mod: CPTII,S$GLB,, | Performed by: STUDENT IN AN ORGANIZED HEALTH CARE EDUCATION/TRAINING PROGRAM

## 2024-07-18 PROCEDURE — 20611 DRAIN/INJ JOINT/BURSA W/US: CPT | Mod: 50,S$GLB,, | Performed by: STUDENT IN AN ORGANIZED HEALTH CARE EDUCATION/TRAINING PROGRAM

## 2024-07-18 PROCEDURE — 1125F AMNT PAIN NOTED PAIN PRSNT: CPT | Mod: CPTII,S$GLB,, | Performed by: STUDENT IN AN ORGANIZED HEALTH CARE EDUCATION/TRAINING PROGRAM

## 2024-07-18 PROCEDURE — 73562 X-RAY EXAM OF KNEE 3: CPT | Mod: 26,59,LT, | Performed by: RADIOLOGY

## 2024-07-18 PROCEDURE — 3044F HG A1C LEVEL LT 7.0%: CPT | Mod: CPTII,S$GLB,, | Performed by: STUDENT IN AN ORGANIZED HEALTH CARE EDUCATION/TRAINING PROGRAM

## 2024-07-18 PROCEDURE — 4010F ACE/ARB THERAPY RXD/TAKEN: CPT | Mod: CPTII,S$GLB,, | Performed by: STUDENT IN AN ORGANIZED HEALTH CARE EDUCATION/TRAINING PROGRAM

## 2024-07-18 PROCEDURE — 1160F RVW MEDS BY RX/DR IN RCRD: CPT | Mod: CPTII,S$GLB,, | Performed by: STUDENT IN AN ORGANIZED HEALTH CARE EDUCATION/TRAINING PROGRAM

## 2024-07-18 PROCEDURE — 99999 PR PBB SHADOW E&M-EST. PATIENT-LVL III: CPT | Mod: PBBFAC,,, | Performed by: STUDENT IN AN ORGANIZED HEALTH CARE EDUCATION/TRAINING PROGRAM

## 2024-07-18 PROCEDURE — 3008F BODY MASS INDEX DOCD: CPT | Mod: CPTII,S$GLB,, | Performed by: STUDENT IN AN ORGANIZED HEALTH CARE EDUCATION/TRAINING PROGRAM

## 2024-07-18 PROCEDURE — 3288F FALL RISK ASSESSMENT DOCD: CPT | Mod: CPTII,S$GLB,, | Performed by: STUDENT IN AN ORGANIZED HEALTH CARE EDUCATION/TRAINING PROGRAM

## 2024-07-18 PROCEDURE — 1101F PT FALLS ASSESS-DOCD LE1/YR: CPT | Mod: CPTII,S$GLB,, | Performed by: STUDENT IN AN ORGANIZED HEALTH CARE EDUCATION/TRAINING PROGRAM

## 2024-07-18 PROCEDURE — 73564 X-RAY EXAM KNEE 4 OR MORE: CPT | Mod: TC,RT

## 2024-07-18 PROCEDURE — 73564 X-RAY EXAM KNEE 4 OR MORE: CPT | Mod: 26,RT,, | Performed by: RADIOLOGY

## 2024-07-18 PROCEDURE — 99214 OFFICE O/P EST MOD 30 MIN: CPT | Mod: 25,S$GLB,, | Performed by: STUDENT IN AN ORGANIZED HEALTH CARE EDUCATION/TRAINING PROGRAM

## 2024-07-18 RX ORDER — KETOROLAC TROMETHAMINE 30 MG/ML
30 INJECTION, SOLUTION INTRAMUSCULAR; INTRAVENOUS
Status: DISCONTINUED | OUTPATIENT
Start: 2024-07-18 | End: 2024-07-18 | Stop reason: HOSPADM

## 2024-07-18 RX ORDER — LORAZEPAM 1 MG/1
1 TABLET ORAL 2 TIMES DAILY
Qty: 60 TABLET | Refills: 2 | Status: SHIPPED | OUTPATIENT
Start: 2024-07-18

## 2024-07-18 RX ORDER — AMITRIPTYLINE HYDROCHLORIDE 100 MG/1
100 TABLET ORAL NIGHTLY
Qty: 90 TABLET | Refills: 1 | Status: SHIPPED | OUTPATIENT
Start: 2024-07-18

## 2024-07-18 RX ORDER — TRAMADOL HYDROCHLORIDE 50 MG/1
50 TABLET ORAL EVERY 12 HOURS PRN
Qty: 14 TABLET | Refills: 0 | Status: SHIPPED | OUTPATIENT
Start: 2024-07-18 | End: 2024-07-25

## 2024-07-18 RX ORDER — ESZOPICLONE 3 MG/1
3 TABLET, FILM COATED ORAL NIGHTLY
Qty: 30 TABLET | Refills: 2 | Status: SHIPPED | OUTPATIENT
Start: 2024-07-18

## 2024-07-18 RX ADMIN — KETOROLAC TROMETHAMINE 30 MG: 30 INJECTION, SOLUTION INTRAMUSCULAR; INTRAVENOUS at 11:07

## 2024-07-18 NOTE — PROCEDURES
Large Joint Aspiration/Injection: bilateral knee    Date/Time: 7/18/2024 11:40 AM    Performed by: Naman Barton MD  Authorized by: Naman Barton MD    Consent Done?:  Yes (Verbal)  Indications:  Arthritis and pain  Site marked: the procedure site was marked    Timeout: prior to procedure the correct patient, procedure, and site was verified    Prep: patient was prepped and draped in usual sterile fashion    Local anesthetic:  Bupivacaine 0.5% without epinephrine and lidocaine 1% without epinephrine    Details:  Needle Size:  21 G  Ultrasonic Guidance for needle placement?: Yes    Images are saved and documented.  Approach:  Lateral (superior)  Location:  Knee  Laterality:  Bilateral  Site:  Bilateral knee  Medications (Right):  30 mg ketorolac 60 mg/2 mL  Medications (Left):  30 mg ketorolac 60 mg/2 mL  Patient tolerance:  Patient tolerated the procedure well with no immediate complications     Ultrasound guidance was used for needle localization. Images were saved and stored for documentation. The appropriate structures were visualized. Dynamic visualization of the needle was continuous throughout the procedures and maintained good position.

## 2024-07-18 NOTE — PROGRESS NOTES
Patient ID: Cassandra Campos  YOB: 1950  MRN: [id number]    Chief Complaint: Pain of the Left Knee and Pain of the Right Knee      History of Present Illness: Cassandra Campos is a right-hand dominant 73 y.o. female who presents today with bilateral knee pain, right greater than left.  Reports that she bumped her right knee recently and has increased her pain. Last seen in clinic on 6/26/2024 and received CSI to the    Left knee.  Received cSI to the right knee on 6/12/2024.  Currently rates pain at a 10/10 with constant throbbing pain that keeps her up at night.  Patient reports pain when walking and sitting.  Completed PT last week and currently taking Mobic and Tylenol.     6/26/2024 Interval History of Present Illness: Cassandra Campos is a right-hand dominant 73 y.o. female who presents today with left knee and left shoulder pain.   Patient notes pain is at worst a 10/10 at times affecting her activity of daily living and thus her quality of life.  She has not using any devices to assist with ambulation nor is she wearing any knee braces.  She states she takes Tylenol as needed to help with the pain .  She states she has having a hard time walking long distances, going from a sitting to standing or standing to seated position, unlevel terrain or stairs.  She also complains of left shoulder pain and dysfunction that had started 3-4 weeks . Her symptoms included pain and lack of range of motion. Her pain was aggravated by overhead movement, reaching movements. She had tried rest, activity modification, Muscle relaxer, ibuprofen, heat and ice.     6/12/2024 Interval History of Present Illness: Cassandra Campos is a right-hand dominant 73 y.o. female who presents today with bilateral shoulder pain and bilateral knee pain.  73-year-old female with history of HTN, HLD, and DM  with c/o left shoulder pain.  States pain to left shoulder with touch and movement. States difficulty lifting  shoulder above head and mostly keeping her up at night.. States taking OTC NSAIDs with minimal relief. She was seen by  OPLO Alan and received left knee CSI on 3/22/24. Received SA steroid injection by Dr. Palma on 2024.  Pt states she was concerned she is still having pain. MRI left shoulder on file. Patient is currently doing Home health physical therapy.     The patient is active in none.  Occupation: Retired      Past Medical History:   Past Medical History:   Diagnosis Date    Arthritis     hands    Bilateral bunions     Borderline glaucoma     De Quervain's disease (radial styloid tenosynovitis)     Gastritis     upper GI 2017    Hydradenitis     Hyperlipidemia     Hypertension     Insomnia     Migraines 2000    Nasal septum perforation     Obesity     Pneumonia     Restrictive airway disease     Sleep apnea     SVT (supraventricular tachycardia) 2013    Trigger finger     Type 2 diabetes mellitus      am 2024     Past Surgical History:   Procedure Laterality Date    AXILLARY HIDRADENITIS EXCISION Bilateral     BONE EXOSTOSIS EXCISION Right 2018    Procedure: EXCISION, EXOSTOSIS;  Surgeon: Jayro Pedraza Sr., MD;  Location: [facility] OR;  Service: Orthopedics;  Laterality: Right;    BREAST BIOPSY Bilateral     both benign    BREAST SURGERY  1998    CARPAL TUNNEL RELEASE      bilateral    CARPAL TUNNEL RELEASE Right 2024    Procedure: RELEASE, CARPAL TUNNEL;  Surgeon: Jaime Oswald MD;  Location: [facility] OR;  Service: Orthopedics;  Laterality: Right;    CARPAL TUNNEL RELEASE Left 2024    Procedure: RELEASE, CARPAL TUNNEL;  Surgeon: Jaime Oswald MD;  Location: [facility] OR;  Service: Orthopedics;  Laterality: Left;    CATARACT EXTRACTION Bilateral     OU     SECTION  1979    CHOLECYSTECTOMY  2014    COLONOSCOPY N/A 10/02/2020    Procedure: COLONOSCOPY;  Surgeon: Tushar Edwards MD;  Location: [facility] ENDO;  Service: Endoscopy;  Laterality:  N/A;    COLONOSCOPY W/ POLYPECTOMY  10/02/2020    Polyps x3, repeat 5 years; Tushar Edwards MD     CYST REMOVAL  07/2015    sebaceous cyst removed from face    DE QUERVAIN'S RELEASE Left 01/16/2020    Procedure: RELEASE, HAND, FOR DEQUERVAIN'S TENOSYNOVITIS;  Surgeon: Jaime Oswald MD;  Location: [facility];  Service: Orthopedics;  Laterality: Left;    DE QUERVAIN'S RELEASE Right 11/20/2020    Procedure: RELEASE, HAND, FOR DEQUERVAIN'S TENOSYNOVITIS;  Surgeon: Jaime Oswald MD;  Location: [facility];  Service: Orthopedics;  Laterality: Right;    ENDOBRONCHIAL ULTRASOUND Bilateral 04/10/2024    Procedure: ENDOBRONCHIAL ULTRASOUND (EBUS);  Surgeon: Adrian Rboin MD;  Location: [facility];  Service: Pulmonary;  Laterality: Bilateral;    EYE SURGERY      gastric sleeve  02/13/2017    Dr. Watson    INJECTION OF ANESTHETIC AGENT AROUND MULTIPLE INTERCOSTAL NERVES  5/10/2024    Procedure: BLOCK, NERVE, INTERCOSTAL, 2 OR MORE;  Surgeon: Mike Nuñez MD;  Location: [facility];  Service: Cardiothoracic;;    KNEE SURGERY Right     OLECRANON BURSECTOMY Right 07/25/2018    Procedure: BURSECTOMY, OLECRANON;  Surgeon: Jayro Pedraza Sr., MD;  Location: [facility];  Service: Orthopedics;  Laterality: Right;    SURGICAL REMOVAL OF BUNION WITH OSTEOTOMY OF METATARSAL BONE Left 05/10/2019    Procedure: BUNIONECTOMY, WITH METATARSAL OSTEOTOMY;  Surgeon: Srinivasan Villanueva DPM;  Location: [facility] OR;  Service: Podiatry;  Laterality: Left;    SURGICAL REMOVAL OF BUNION WITH OSTEOTOMY OF METATARSAL BONE Right 06/28/2019    Procedure: BUNIONECTOMY, WITH METATARSAL OSTEOTOMY;  Surgeon: Srinivasan Villanueva DPM;  Location: [facility] OR;  Service: Podiatry;  Laterality: Right;    SURGICAL REMOVAL OF LYMPH NODE Left 5/10/2024    Procedure: EXCISION, LYMPH NODE;  Surgeon: Mike Nuñez MD;  Location: [facility];  Service: Cardiothoracic;  Laterality: Left;    TONSILLECTOMY, ADENOIDECTOMY  1980s    TRANSESOPHAGEAL ECHOCARDIOGRAM WITH  POSSIBLE CARDIOVERSION (LAKESHIA W/ POSS CARDIOVERSION) N/A 5/15/2024    Procedure: Transesophageal echo (LAKESHIA) intra-procedure log documentation;  Surgeon: Twan Pelaez MD;  Location: [facility] CATH LAB;  Service: Cardiology;  Laterality: N/A;    TRIGGER FINGER RELEASE Right 2015    Dr. Milo HALL ROBOTIC RATS,WITH LOBECTOMY,LUNG Left 5/10/2024    Procedure: XI ROBOTIC RATS,WITH LOBECTOMY,LUNG;  Surgeon: Mike Nuñez MD;  Location: [facility] OR;  Service: Cardiothoracic;  Laterality: Left;  Lingulectomy     Family History   Problem Relation Name Age of Onset    Prostate cancer Brother      Diabetes Maternal Aunt Alondra Campos     Diabetes Cousin      Hypertension Maternal Grandmother Portia Orosco      Social History     Socioeconomic History    Marital status:     Number of children: 1   Occupational History    Occupation:  aid   Tobacco Use    Smoking status: Former     Current packs/day: 0.00     Average packs/day: 0.5 packs/day for 42.0 years (21.0 ttl pk-yrs)     Types: Cigarettes     Start date: 1970     Quit date: 2012     Years since quittin.5     Passive exposure: Past    Smokeless tobacco: Never   Substance and Sexual Activity    Alcohol use: Not Currently     Alcohol/week: 1.0 standard drink of alcohol     Types: 1 Glasses of wine per week     Comment: Glass red wine once a every 2 weeks    Drug use: Never    Sexual activity: Not Currently     Partners: Female     Birth control/protection: Abstinence, None   Social History Narrative    Single, part-time teacher. Masters degree biology.      Social Determinants of Health     Financial Resource Strain: Low Risk  (2023)    Overall Financial Resource Strain (CARDIA)     Difficulty of Paying Living Expenses: Not hard at all   Food Insecurity: Food Insecurity Present (2023)    Hunger Vital Sign     Worried About Running Out of Food in the Last Year: Never true     Ran Out of Food in the Last Year: Sometimes true    Transportation Needs: No Transportation Needs (12/19/2023)    PRAPARE - Transportation     Lack of Transportation (Medical): No     Lack of Transportation (Non-Medical): No   Physical Activity: Insufficiently Active (12/19/2023)    Exercise Vital Sign     Days of Exercise per Week: 3 days     Minutes of Exercise per Session: 20 min   Stress: No Stress Concern Present (12/19/2023)    [facility] - Occupational Stress Questionnaire     Feeling of Stress : Not at all   Housing Stability: Low Risk  (12/19/2023)    Housing Stability Vital Sign     Unable to Pay for Housing in the Last Year: No     Number of Places Lived in the Last Year: 1     Unstable Housing in the Last Year: No     Medication List with Changes/Refills   New Medications    TRAMADOL (ULTRAM) 50 MG TABLET    Take 1 tablet (50 mg total) by mouth every 12 (twelve) hours as needed for Pain.   Current Medications    ALBUTEROL (PROVENTIL/VENTOLIN HFA) 90 MCG/ACTUATION INHALER    INHALE 2 PUFFS INTO THE LUNGS EVERY 4 HOURS AS NEEDED FOR WHEEZING OR SHORTNESS OF BREATH.    AMITRIPTYLINE (ELAVIL) 100 MG TABLET    Take 1 tablet (100 mg total) by mouth every evening.    APIXABAN (ELIQUIS) 5 MG TAB    Take 1 tablet (5 mg total) by mouth 2 (two) times daily.    BLOOD-GLUCOSE METER (ACCU-CHEK GUIDE ME GLUCOSE MTR) MISC    use to check blood glucose 2 times a day    BLOOD-GLUCOSE SENSOR (DEXCOM G7 SENSOR) LUISA    1 each by Misc.(Non-Drug; Combo Route) route continuous.    ESZOPICLONE (LUNESTA) 3 MG TAB    Take 1 tablet (3 mg total) by mouth every evening.    GUAIFENESIN-CODEINE 100-10 MG/5 ML (TUSSI-ORGANIDIN NR)  MG/5 ML SYRUP    Take 5 mLs by mouth 3 (three) times daily.    LIDOCAINE (LIDODERM) 5 %    Place 1 patch onto the skin once daily. Remove & Discard patch within 12 hours or as directed by MD    LORAZEPAM (ATIVAN) 1 MG TABLET    Take 1 tablet (1 mg total) by mouth 2 (two) times daily.    LOSARTAN (COZAAR) 25 MG TABLET     TAKE 1 TABLET BY MOUTH ONCE  DAILY    MELOXICAM (MOBIC) 15 MG TABLET    Take 1 tab daily for 14 days, then daily as needed.    MELOXICAM (MOBIC) 15 MG TABLET    Take 1 tablet (15 mg total) by mouth once daily. Take 1 tab daily for 14 days, then daily as needed.    METOPROLOL SUCCINATE (TOPROL-XL) 50 MG 24 HR TABLET    TAKE 1 TABLET BY MOUTH ONCE  DAILY    OMEPRAZOLE (PRILOSEC) 20 MG CAPSULE    TAKE 1 CAPSULE BY MOUTH ONCE  DAILY    SEMAGLUTIDE (OZEMPIC) 2 MG/DOSE (8 MG/3 ML) PNIJ    Inject 2 mg into the skin every 7 days.     Review of patient's allergies indicates:  No Known Allergies    Physical Exam:   Body mass index is 29.8 kg/m².    GENERAL: Well appearing, in no acute distress.  HEAD: Normocephalic and atraumatic.  ENT: External ears and nose grossly normal.  EYES: EOMI bilaterally  PULMONARY: Respirations are grossly even and non-labored.  NEURO: Awake, alert, and oriented x 3.  SKIN: No obvious rashes appreciated.  PSYCH: Mood & affect are appropriate.    Detailed MSK exam:         Imaging:  X-ray Knee Ortho Right with Flexion  Narrative: EXAMINATION:  XR KNEE ORTHO RIGHT WITH FLEXION    CLINICAL HISTORY:  Pain in right knee    TECHNIQUE:  AP standing views of both knees, AP flexion views of both knees, lateral view of the right knee and Merchant views of both knees    COMPARISON:  04/25/2024    FINDINGS:  The joint spaces of all 3 compartments of the right knee appear to be relatively well maintained.  No joint effusion.  No acute fracture or dislocation.  Impression: 1.  As above    Electronically signed by: Solomon Pennington DO  Date:    07/18/2024  Time:    11:39        Relevant imaging results were reviewed and interpreted by me and per my read shows mild arthritic changes bilaterally.  This was discussed with the patient and / or family today.     Assessment:  Cassandra Campos is a 73 y.o. female following up for bilateral knee pain. Had fall recently and pain has become worse. Radiographs today  reassuring.   Plan: toradol injections today. One-time tramadol prescription. Prior auth for gel injections. Pain referral.   Follow up for gel injections. All questions answered.     Primary osteoarthritis of both knees  -     Ambulatory referral/consult to Pain Clinic; Future; Expected date: 07/25/2024  -     Prior authorization Order  -     Sports Medicine US - Guidance for Needle Placement  -     traMADoL (ULTRAM) 50 mg tablet; Take 1 tablet (50 mg total) by mouth every 12 (twelve) hours as needed for Pain.  Dispense: 14 tablet; Refill: 0  -     Large Joint Aspiration/Injection: bilateral knee         Ultrasound guidance was used for needle localization. Images were saved and stored for documentation. The appropriate structures were visualized. Dynamic visualization of the needle was continuous throughout the procedures and maintained good position.      MEDICAL NECESSITY FOR VISCOSUPPLEMENTATION: After thorough evaluation of the patient, I have determined that visco-supplementation is medically necessary. The patient has painful degenerative changes of the knee with failure of conservative treatments including lifestyle modifications and rehabilitation exercises.  Oral analgesis/NSAIDs have not adequately controlled symptoms and there is radiographic evidence of Kellgren Sanchez grade 2 or greater osteoarthritic changes, or in lack of radiographic evidence, there is arthroscopic or other evidence of chondrosis.     Electronically signed:  Naman Barton MD, MPH  07/18/2024  1:29 PM

## 2024-07-18 NOTE — PATIENT INSTRUCTIONS
Assessment:  Cassandra Campos is a 73 y.o. female   Chief Complaint   Patient presents with    Left Knee - Pain    Right Knee - Pain       Encounter Diagnosis   Name Primary?    Primary osteoarthritis of both knees Yes        Plan:  Ultrasound guided Toradol injection for bilateral knees  We discussed the proper protocols after the injection such as no submerging pools, baths tubs, or hot tubs for 24 hr.  Showering is okay today.  We also discussed that blood sugars can be elevated after an injection and asked patient to properly checked her sugars over the next few days and contact their PCP if there are any concerns.  We discussed red flags such as fevers, chills, red, warm, tender joint at the area of injection to please seek medical care immediately.    Prescription for one time Tramadol.  Pre auth for bilateral one series gel - Synvisc One  Referral to Pain Management   Follow up for bilateral gel injections    Follow-up: for gel injections.    Thank you for choosing [facility] and Dr. Naman Barton for your orthopedic & sports medicine care. It is our goal to provide you with exceptional care that will help keep you healthy, active, and get you back in the game.    Please do not hesitate to reach out to us via email, phone, or MyChart with any questions, concerns, or feedback.    If you felt that you received exemplary care today, please consider leaving us feedback on [employer] at:  [email or web address]    If you are experiencing pain/discomfort ,or have questions after 5pm and would like to be connected to the [facility] [place] on-call team, please call this number and specify which Sports Medicine provider is treating you: [phone]

## 2024-07-22 ENCOUNTER — TELEPHONE (OUTPATIENT)
Dept: SPORTS MEDICINE | Facility: CLINIC | Age: 74
End: 2024-07-22
Payer: MEDICARE

## 2024-07-22 ENCOUNTER — EXTERNAL HOME HEALTH (OUTPATIENT)
Dept: HOME HEALTH SERVICES | Facility: HOSPITAL | Age: 74
End: 2024-07-22
Payer: MEDICARE

## 2024-07-23 DIAGNOSIS — M79.642 BILATERAL HAND PAIN: Primary | ICD-10-CM

## 2024-07-23 DIAGNOSIS — M79.641 BILATERAL HAND PAIN: Primary | ICD-10-CM

## 2024-07-26 ENCOUNTER — HOSPITAL ENCOUNTER (OUTPATIENT)
Dept: RADIOLOGY | Facility: HOSPITAL | Age: 74
Discharge: HOME | End: 2024-07-26
Attending: ORTHOPAEDIC SURGERY
Payer: MEDICARE

## 2024-07-26 ENCOUNTER — OFFICE VISIT (OUTPATIENT)
Dept: ORTHOPEDICS | Facility: CLINIC | Age: 74
End: 2024-07-26
Payer: MEDICARE

## 2024-07-26 VITALS — HEIGHT: 63 IN | WEIGHT: 168.19 LBS | BODY MASS INDEX: 29.8 KG/M2

## 2024-07-26 DIAGNOSIS — M79.642 BILATERAL HAND PAIN: ICD-10-CM

## 2024-07-26 DIAGNOSIS — M19.049 ARTHRITIS OF HAND: Primary | ICD-10-CM

## 2024-07-26 DIAGNOSIS — M79.641 BILATERAL HAND PAIN: ICD-10-CM

## 2024-07-26 PROCEDURE — 73130 X-RAY EXAM OF HAND: CPT | Mod: TC,50

## 2024-07-26 PROCEDURE — 99999 PR PBB SHADOW E&M-EST. PATIENT-LVL III: CPT | Mod: PBBFAC,,, | Performed by: ORTHOPAEDIC SURGERY

## 2024-07-26 PROCEDURE — 73130 X-RAY EXAM OF HAND: CPT | Mod: 26,50,, | Performed by: RADIOLOGY

## 2024-07-26 NOTE — PROGRESS NOTES
Subjective:     Patient ID: Cassandra Campos is a 74 y.o. female.    Chief Complaint: Pain of the Right Hand and Pain of the Left Hand      HPI:  The patient is a 74-year-old female with osteoarthritis multiple fingers of both hands.  She complains of swelling and pain.  She has symptoms of COVID.    Past Medical History:   Diagnosis Date    Arthritis     hands    Bilateral bunions     Borderline glaucoma     De Quervain's disease (radial styloid tenosynovitis)     Gastritis     upper GI 2017    Hydradenitis     Hyperlipidemia     Hypertension     Insomnia     Migraines 2000    Nasal septum perforation     Obesity     Pneumonia     Restrictive airway disease     Sleep apnea     SVT (supraventricular tachycardia) 2013    Trigger finger     Type 2 diabetes mellitus      am 2024     Past Surgical History:   Procedure Laterality Date    AXILLARY HIDRADENITIS EXCISION Bilateral     BONE EXOSTOSIS EXCISION Right 2018    Procedure: EXCISION, EXOSTOSIS;  Surgeon: Jayro Pedraza Sr., MD;  Location: [facility] OR;  Service: Orthopedics;  Laterality: Right;    BREAST BIOPSY Bilateral     both benign    BREAST SURGERY  1998    CARPAL TUNNEL RELEASE      bilateral    CARPAL TUNNEL RELEASE Right 2024    Procedure: RELEASE, CARPAL TUNNEL;  Surgeon: Jaime Oswald MD;  Location: [facility] OR;  Service: Orthopedics;  Laterality: Right;    CARPAL TUNNEL RELEASE Left 2024    Procedure: RELEASE, CARPAL TUNNEL;  Surgeon: Jaime Oswald MD;  Location: [facility] OR;  Service: Orthopedics;  Laterality: Left;    CATARACT EXTRACTION Bilateral     OU     SECTION  1979    CHOLECYSTECTOMY  2014    COLONOSCOPY N/A 10/02/2020    Procedure: COLONOSCOPY;  Surgeon: Tushar Edwards MD;  Location: [facility];  Service: Endoscopy;  Laterality: N/A;    COLONOSCOPY W/ POLYPECTOMY  10/02/2020    Polyps x3, repeat 5 years; Tushar Edwards MD     CYST REMOVAL  2015    sebaceous cyst removed  from face    DE QUERVAIN'S RELEASE Left 01/16/2020    Procedure: RELEASE, HAND, FOR DEQUERVAIN'S TENOSYNOVITIS;  Surgeon: Jaime Oswald MD;  Location: [facility] OR;  Service: Orthopedics;  Laterality: Left;    DE QUERVAIN'S RELEASE Right 11/20/2020    Procedure: RELEASE, HAND, FOR DEQUERVAIN'S TENOSYNOVITIS;  Surgeon: Jaime Oswald MD;  Location: [facility] OR;  Service: Orthopedics;  Laterality: Right;    ENDOBRONCHIAL ULTRASOUND Bilateral 04/10/2024    Procedure: ENDOBRONCHIAL ULTRASOUND (EBUS);  Surgeon: Adrian Robin MD;  Location: [facility] ENDO;  Service: Pulmonary;  Laterality: Bilateral;    EYE SURGERY      gastric sleeve  02/13/2017    Dr. Watson    INJECTION OF ANESTHETIC AGENT AROUND MULTIPLE INTERCOSTAL NERVES  5/10/2024    Procedure: BLOCK, NERVE, INTERCOSTAL, 2 OR MORE;  Surgeon: Mike Nuñez MD;  Location: [facility] OR;  Service: Cardiothoracic;;    KNEE SURGERY Right     OLECRANON BURSECTOMY Right 07/25/2018    Procedure: BURSECTOMY, OLECRANON;  Surgeon: Jayro Pedraza Sr., MD;  Location: [facility] OR;  Service: Orthopedics;  Laterality: Right;    SURGICAL REMOVAL OF BUNION WITH OSTEOTOMY OF METATARSAL BONE Left 05/10/2019    Procedure: BUNIONECTOMY, WITH METATARSAL OSTEOTOMY;  Surgeon: Srinivasan Villanueva DPM;  Location: [facility] OR;  Service: Podiatry;  Laterality: Left;    SURGICAL REMOVAL OF BUNION WITH OSTEOTOMY OF METATARSAL BONE Right 06/28/2019    Procedure: BUNIONECTOMY, WITH METATARSAL OSTEOTOMY;  Surgeon: Srinivasan Villanueva DPM;  Location: [facility] OR;  Service: Podiatry;  Laterality: Right;    SURGICAL REMOVAL OF LYMPH NODE Left 5/10/2024    Procedure: EXCISION, LYMPH NODE;  Surgeon: Mike Nuñez MD;  Location: [facility] OR;  Service: Cardiothoracic;  Laterality: Left;    TONSILLECTOMY, ADENOIDECTOMY  1980s    TRANSESOPHAGEAL ECHOCARDIOGRAM WITH POSSIBLE CARDIOVERSION (LAKESHIA W/ POSS CARDIOVERSION) N/A 5/15/2024    Procedure: Transesophageal echo (LAKESHIA) intra-procedure log documentation;  Surgeon:  Twan Pelaez MD;  Location: [facility] CATH LAB;  Service: Cardiology;  Laterality: N/A;    TRIGGER FINGER RELEASE Right 2015    Dr. Milo HALL ROBOTIC RATS,WITH LOBECTOMY,LUNG Left 5/10/2024    Procedure: XI ROBOTIC RATS,WITH LOBECTOMY,LUNG;  Surgeon: Mike Nuñez MD;  Location: [facility] OR;  Service: Cardiothoracic;  Laterality: Left;  Lingulectomy     Family History   Problem Relation Name Age of Onset    Prostate cancer Brother      Diabetes Maternal Aunt Alondra Campos     Diabetes Cousin      Hypertension Maternal Grandmother Portia Orosco      Social History     Socioeconomic History    Marital status:     Number of children: 1   Occupational History    Occupation:  aid   Tobacco Use    Smoking status: Former     Current packs/day: 0.00     Average packs/day: 0.5 packs/day for 42.0 years (21.0 ttl pk-yrs)     Types: Cigarettes     Start date: 1970     Quit date: 2012     Years since quittin.5     Passive exposure: Past    Smokeless tobacco: Never   Substance and Sexual Activity    Alcohol use: Not Currently     Alcohol/week: 1.0 standard drink of alcohol     Types: 1 Glasses of wine per week     Comment: Glass red wine once a every 2 weeks    Drug use: Never    Sexual activity: Not Currently     Partners: Female     Birth control/protection: Abstinence, None   Social History Narrative    Single, part-time teacher. Masters degree biology.      Social Determinants of Health     Financial Resource Strain: Low Risk  (2023)    Overall Financial Resource Strain (CARDIA)     Difficulty of Paying Living Expenses: Not hard at all   Food Insecurity: Food Insecurity Present (2023)    Hunger Vital Sign     Worried About Running Out of Food in the Last Year: Never true     Ran Out of Food in the Last Year: Sometimes true   Transportation Needs: No Transportation Needs (2023)    PRAPARE - Transportation     Lack of Transportation (Medical): No     Lack of Transportation  (Non-Medical): No   Physical Activity: Insufficiently Active (12/19/2023)    Exercise Vital Sign     Days of Exercise per Week: 3 days     Minutes of Exercise per Session: 20 min   Stress: No Stress Concern Present (12/19/2023)    [employer] [place] [employer] - Occupational Stress Questionnaire     Feeling of Stress : Not at all   Housing Stability: Low Risk  (12/19/2023)    Housing Stability Vital Sign     Unable to Pay for Housing in the Last Year: No     Number of Places Lived in the Last Year: 1     Unstable Housing in the Last Year: No     Medication List with Changes/Refills   Current Medications    ALBUTEROL (PROVENTIL/VENTOLIN HFA) 90 MCG/ACTUATION INHALER    INHALE 2 PUFFS INTO THE LUNGS EVERY 4 HOURS AS NEEDED FOR WHEEZING OR SHORTNESS OF BREATH.    AMITRIPTYLINE (ELAVIL) 100 MG TABLET    Take 1 tablet (100 mg total) by mouth every evening.    APIXABAN (ELIQUIS) 5 MG TAB    Take 1 tablet (5 mg total) by mouth 2 (two) times daily.    BLOOD-GLUCOSE METER (ACCU-CHEK GUIDE ME GLUCOSE MTR) MISC    use to check blood glucose 2 times a day    BLOOD-GLUCOSE SENSOR (DEXCOM G7 SENSOR) LUISA    1 each by Misc.(Non-Drug; Combo Route) route continuous.    ESZOPICLONE (LUNESTA) 3 MG TAB    Take 1 tablet (3 mg total) by mouth every evening.    GUAIFENESIN-CODEINE 100-10 MG/5 ML (TUSSI-ORGANIDIN NR)  MG/5 ML SYRUP    Take 5 mLs by mouth 3 (three) times daily.    LIDOCAINE (LIDODERM) 5 %    Place 1 patch onto the skin once daily. Remove & Discard patch within 12 hours or as directed by MD    LORAZEPAM (ATIVAN) 1 MG TABLET    Take 1 tablet (1 mg total) by mouth 2 (two) times daily.    LOSARTAN (COZAAR) 25 MG TABLET    TAKE 1 TABLET BY MOUTH ONCE  DAILY    MELOXICAM (MOBIC) 15 MG TABLET    Take 1 tab daily for 14 days, then daily as needed.    MELOXICAM (MOBIC) 15 MG TABLET    Take 1 tablet (15 mg total) by mouth once daily. Take 1 tab daily for 14 days, then daily as needed.    METOPROLOL SUCCINATE  (TOPROL-XL) 50 MG 24 HR TABLET    TAKE 1 TABLET BY MOUTH ONCE  DAILY    OMEPRAZOLE (PRILOSEC) 20 MG CAPSULE    TAKE 1 CAPSULE BY MOUTH ONCE  DAILY    SEMAGLUTIDE (OZEMPIC) 2 MG/DOSE (8 MG/3 ML) PNIJ    Inject 2 mg into the skin every 7 days.     Review of patient's allergies indicates:  No Known Allergies  Review of Systems   Constitutional: Negative for malaise/fatigue.   HENT:  Negative for hearing loss.    Eyes:  Negative for double vision and visual disturbance.   Cardiovascular:  Positive for chest pain.   Respiratory:  Positive for wheezing. Negative for shortness of breath.    Endocrine: Negative for cold intolerance.   Hematologic/Lymphatic: Does not bruise/bleed easily.   Skin:  Negative for poor wound healing and suspicious lesions.   Musculoskeletal:  Positive for arthritis, falls, joint pain and joint swelling. Negative for gout.   Gastrointestinal:  Positive for heartburn. Negative for nausea and vomiting.   Genitourinary:  Positive for pelvic pain. Negative for dysuria.   Neurological:  Positive for numbness, paresthesias and sensory change.   Psychiatric/Behavioral:  Positive for altered mental status and depression. Negative for memory loss and substance abuse. The patient is nervous/anxious.    Allergic/Immunologic: Negative for persistent infections.       Objective:   Body mass index is 29.8 kg/m².  There were no vitals filed for this visit.             General    Constitutional: She is oriented to person, place, and time. She appears well-developed and well-nourished. No distress.   HENT:   Head: Normocephalic.   Eyes: EOM are normal.   Pulmonary/Chest: Effort normal.   Neurological: She is oriented to person, place, and time.   Psychiatric: She has a normal mood and affect.             Right Hand/Wrist Exam     Inspection   Scars: Wrist - present Hand -  present  Effusion: Wrist - absent Hand -  absent    Pain   Hand - The patient exhibits pain of the middle IP, ring IP, thumb IP, little IP and  index IP.    Other     Neuorologic Exam    Median Distribution: normal  Ulnar Distribution: normal  Radial Distribution: normal    Comments:  The patient has well-healed de Quervain and carpal tunnel scars.  She has Heberden's nodes in multiple digits that are tender      Left Hand/Wrist Exam     Inspection   Scars: Wrist - present Hand -  present  Effusion: Wrist - absent Hand -  absent    Pain   Hand - The patient exhibits pain of the middle IP, ring IP, thumb IP, little IP and index IP.    Other     Sensory Exam  Median Distribution: normal  Ulnar Distribution: normal  Radial Distribution: normal    Comments:  The patient has well-healed de Quervain and carpal tunnel scars.  She has Heberden's nodes in multiple digits that are tender          Vascular Exam       Capillary Refill  Right Hand: normal capillary refill  Left Hand: normal capillary refill          Relevant imaging results reviewed and interpreted by me, discussed with the patient and / or family today radiographs both hands reviewed which showed osteoarthritic change in multiple small joints  Assessment:     Encounter Diagnosis   Name Primary?    Arthritis of hand Yes        Plan:     The patient was sent for COVID testing.  She will use Voltaren gel on an as needed basis.  She has osteoarthritic changes in multiple joints                Disclaimer: This note was prepared using a voice recognition system and is likely to have sound alike errors within the text.

## 2024-07-27 ENCOUNTER — HOSPITAL ENCOUNTER (EMERGENCY)
Facility: HOSPITAL | Age: 74
Discharge: HOME | End: 2024-07-27
Attending: FAMILY MEDICINE
Payer: MEDICARE

## 2024-07-27 VITALS
DIASTOLIC BLOOD PRESSURE: 84 MMHG | HEART RATE: 104 BPM | SYSTOLIC BLOOD PRESSURE: 163 MMHG | OXYGEN SATURATION: 96 % | TEMPERATURE: 98 F | RESPIRATION RATE: 18 BRPM

## 2024-07-27 DIAGNOSIS — R00.0 TACHYCARDIA: ICD-10-CM

## 2024-07-27 DIAGNOSIS — R53.1 WEAKNESS: ICD-10-CM

## 2024-07-27 DIAGNOSIS — R40.0 SOMNOLENCE: ICD-10-CM
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
## 2024-07-27 LAB
ALBUMIN SERPL BCP-MCNC: 3 G/DL (ref 3.5–5.2)
ALP SERPL-CCNC: 89 U/L (ref 55–135)
ALT SERPL W/O P-5'-P-CCNC: 19 U/L (ref 10–44)
AMPHET+METHAMPHET UR QL: NEGATIVE
ANION GAP SERPL CALC-SCNC: 11 MMOL/L (ref 8–16)
AST SERPL-CCNC: 20 U/L (ref 10–40)
BARBITURATES UR QL SCN>200 NG/ML: NEGATIVE
BASOPHILS # BLD AUTO: 0.02 K/UL (ref 0–0.2)
BASOPHILS NFR BLD: 0.3 % (ref 0–1.9)
BENZODIAZ UR QL SCN>200 NG/ML: NEGATIVE
BILIRUB SERPL-MCNC: 0.3 MG/DL (ref 0.1–1)
BILIRUB UR QL STRIP: NEGATIVE
BNP SERPL-MCNC: <10 PG/ML (ref 0–99)
BUN SERPL-MCNC: 7 MG/DL (ref 8–23)
BZE UR QL SCN: NEGATIVE
CALCIUM SERPL-MCNC: 9.6 MG/DL (ref 8.7–10.5)
CANNABINOIDS UR QL SCN: NEGATIVE
CHLORIDE SERPL-SCNC: 102 MMOL/L (ref 95–110)
CLARITY UR: CLEAR
CO2 SERPL-SCNC: 21 MMOL/L (ref 23–29)
COLOR UR: YELLOW
CREAT SERPL-MCNC: 0.6 MG/DL (ref 0.5–1.4)
CREAT UR-MCNC: 63.3 MG/DL (ref 15–325)
DIFFERENTIAL METHOD BLD: ABNORMAL
EOSINOPHIL # BLD AUTO: 0 K/UL (ref 0–0.5)
EOSINOPHIL NFR BLD: 0.5 % (ref 0–8)
ERYTHROCYTE [DISTWIDTH] IN BLOOD BY AUTOMATED COUNT: 15.4 % (ref 11.5–14.5)
EST. GFR  (NO RACE VARIABLE): >60 ML/MIN/1.73 M^2
GLUCOSE SERPL-MCNC: 90 MG/DL (ref 70–110)
GLUCOSE UR QL STRIP: NEGATIVE
HCT VFR BLD AUTO: 39.3 % (ref 37–48.5)
HGB BLD-MCNC: 13.2 G/DL (ref 12–16)
HGB UR QL STRIP: NEGATIVE
IMM GRANULOCYTES # BLD AUTO: 0.02 K/UL (ref 0–0.04)
IMM GRANULOCYTES NFR BLD AUTO: 0.3 % (ref 0–0.5)
KETONES UR QL STRIP: NEGATIVE
LEUKOCYTE ESTERASE UR QL STRIP: NEGATIVE
LYMPHOCYTES # BLD AUTO: 1.7 K/UL (ref 1–4.8)
LYMPHOCYTES NFR BLD: 27.3 % (ref 18–48)
MCH RBC QN AUTO: 24.6 PG (ref 27–31)
MCHC RBC AUTO-ENTMCNC: 33.6 G/DL (ref 32–36)
MCV RBC AUTO: 73 FL (ref 82–98)
METHADONE UR QL SCN>300 NG/ML: NEGATIVE
MONOCYTES # BLD AUTO: 0.7 K/UL (ref 0.3–1)
MONOCYTES NFR BLD: 10.9 % (ref 4–15)
NEUTROPHILS # BLD AUTO: 3.8 K/UL (ref 1.8–7.7)
NEUTROPHILS NFR BLD: 60.7 % (ref 38–73)
NITRITE UR QL STRIP: NEGATIVE
NRBC BLD-RTO: 0 /100 WBC
OPIATES UR QL SCN: NEGATIVE
PCP UR QL SCN>25 NG/ML: NEGATIVE
PH UR STRIP: 7 [PH] (ref 5–8)
PLATELET # BLD AUTO: 308 K/UL (ref 150–450)
PMV BLD AUTO: 9 FL (ref 9.2–12.9)
POTASSIUM SERPL-SCNC: 4 MMOL/L (ref 3.5–5.1)
PROT SERPL-MCNC: 8.1 G/DL (ref 6–8.4)
PROT UR QL STRIP: NEGATIVE
RBC # BLD AUTO: 5.37 M/UL (ref 4–5.4)
SODIUM SERPL-SCNC: 134 MMOL/L (ref 136–145)
SP GR UR STRIP: 1.01 (ref 1–1.03)
TOXICOLOGY INFORMATION: NORMAL
TROPONIN I SERPL DL<=0.01 NG/ML-MCNC: <0.006 NG/ML (ref 0–0.03)
URN SPEC COLLECT METH UR: NORMAL
UROBILINOGEN UR STRIP-ACNC: NEGATIVE EU/DL
WBC # BLD AUTO: 6.31 K/UL (ref 3.9–12.7)

## 2024-07-27 PROCEDURE — 85025 COMPLETE CBC W/AUTO DIFF WBC: CPT | Performed by: FAMILY MEDICINE

## 2024-07-27 PROCEDURE — 80307 DRUG TEST PRSMV CHEM ANLYZR: CPT | Performed by: FAMILY MEDICINE

## 2024-07-27 PROCEDURE — 81003 URINALYSIS AUTO W/O SCOPE: CPT | Mod: 59 | Performed by: FAMILY MEDICINE

## 2024-07-27 PROCEDURE — 93005 ELECTROCARDIOGRAM TRACING: CPT

## 2024-07-27 PROCEDURE — 83880 ASSAY OF NATRIURETIC PEPTIDE: CPT | Performed by: FAMILY MEDICINE

## 2024-07-27 PROCEDURE — 93010 ELECTROCARDIOGRAM REPORT: CPT | Mod: ,,, | Performed by: INTERNAL MEDICINE

## 2024-07-27 PROCEDURE — 84484 ASSAY OF TROPONIN QUANT: CPT | Performed by: FAMILY MEDICINE

## 2024-07-27 PROCEDURE — 96360 HYDRATION IV INFUSION INIT: CPT

## 2024-07-27 PROCEDURE — 80053 COMPREHEN METABOLIC PANEL: CPT | Performed by: FAMILY MEDICINE

## 2024-07-27 PROCEDURE — 25000003 PHARM REV CODE 250: Performed by: FAMILY MEDICINE

## 2024-07-27 PROCEDURE — 99285 EMERGENCY DEPT VISIT HI MDM: CPT | Mod: 25

## 2024-07-27 RX ADMIN — SODIUM CHLORIDE 1000 ML: 9 INJECTION, SOLUTION INTRAVENOUS at 07:07

## 2024-07-27 NOTE — ED PROVIDER NOTES
SCRIBE #1 NOTE: I, Hermila Reed, am scribing for, and in the presence of, Shaila Mathews MD. I have scribed the HPI, ROS, and PEx.    SCRIBE #2 NOTE: I, Latonia Maciel, am scribing for, and in the presence of,  Estevan Fernandez Jr., MD. I have scribed the remaining portions of the note not scribed by Scribe #1.      History     Chief Complaint   Patient presents with    Fall     Pt slipped out of bed today and fell at home. Denies injury, hitting head, or LOC, is + blood thinners. Since the fall pt has been weak. CBG en route: 115.      Review of patient's allergies indicates:  No Known Allergies      History of Present Illness     HPI    7/27/2024, 6:49 PM  History obtained from the patient      History of Present Illness: Cassandra Campos is a 74 y.o. female patient with a PMHx of HTN, HLD, SVT, type 2 DM, and De Quervain's disease who presents to the Emergency Department for evaluation of a fall which occurred today. Pt reports a slip and fall at home today on the way to the bathroom. Pt reports generalized weakness which onset after the fall. Pt denies injury, hitting her head, or LOC. Pt reports blood thinner use. Pt's CBG en route was 115. No further complaints or concerns at this time.       Arrival mode: EMS    PCP: Emil Zhang MD        Past Medical History:  Past Medical History:   Diagnosis Date    Arthritis     hands    Bilateral bunions     Borderline glaucoma     De Quervain's disease (radial styloid tenosynovitis)     Gastritis     upper GI 2/2017    Hydradenitis     Hyperlipidemia     Hypertension     Insomnia     Migraines 02/01/2000    Nasal septum perforation     Obesity     Pneumonia     Restrictive airway disease     Sleep apnea     SVT (supraventricular tachycardia) 09/2013    Trigger finger     Type 2 diabetes mellitus 2012     am 02/01/2024       Past Surgical History:  Past Surgical History:   Procedure Laterality Date    AXILLARY HIDRADENITIS EXCISION Bilateral      BONE EXOSTOSIS EXCISION Right 2018    Procedure: EXCISION, EXOSTOSIS;  Surgeon: Jayro Pedraza Sr., MD;  Location: [facility];  Service: Orthopedics;  Laterality: Right;    BREAST BIOPSY Bilateral     both benign    BREAST SURGERY  1998    CARPAL TUNNEL RELEASE      bilateral    CARPAL TUNNEL RELEASE Right 2024    Procedure: RELEASE, CARPAL TUNNEL;  Surgeon: Jaime Oswald MD;  Location: [facility] OR;  Service: Orthopedics;  Laterality: Right;    CARPAL TUNNEL RELEASE Left 2024    Procedure: RELEASE, CARPAL TUNNEL;  Surgeon: Jaime Oswald MD;  Location: [facility] OR;  Service: Orthopedics;  Laterality: Left;    CATARACT EXTRACTION Bilateral     OU     SECTION  1979    CHOLECYSTECTOMY  2014    COLONOSCOPY N/A 10/02/2020    Procedure: COLONOSCOPY;  Surgeon: Tushar Edwards MD;  Location: [facility];  Service: Endoscopy;  Laterality: N/A;    COLONOSCOPY W/ POLYPECTOMY  10/02/2020    Polyps x3, repeat 5 years; Tushar Edwards MD     CYST REMOVAL  2015    sebaceous cyst removed from face    DE QUERVAIN'S RELEASE Left 2020    Procedure: RELEASE, HAND, FOR DEQUERVAIN'S TENOSYNOVITIS;  Surgeon: Jaime Oswald MD;  Location: [facility] OR;  Service: Orthopedics;  Laterality: Left;    DE QUERVAIN'S RELEASE Right 2020    Procedure: RELEASE, HAND, FOR DEQUERVAIN'S TENOSYNOVITIS;  Surgeon: Jaime Oswald MD;  Location: [facility];  Service: Orthopedics;  Laterality: Right;    ENDOBRONCHIAL ULTRASOUND Bilateral 04/10/2024    Procedure: ENDOBRONCHIAL ULTRASOUND (EBUS);  Surgeon: Adrian Robin MD;  Location: [facility];  Service: Pulmonary;  Laterality: Bilateral;    EYE SURGERY      gastric sleeve  2017    Dr. Watson    INJECTION OF ANESTHETIC AGENT AROUND MULTIPLE INTERCOSTAL NERVES  5/10/2024    Procedure: BLOCK, NERVE, INTERCOSTAL, 2 OR MORE;  Surgeon: Mike Nuñez MD;  Location: [facility];  Service: Cardiothoracic;;    KNEE SURGERY Right     OLECRANON  BURSECTOMY Right 2018    Procedure: BURSECTOMY, OLECRANON;  Surgeon: Jayro Pedraza Sr., MD;  Location: [facility] OR;  Service: Orthopedics;  Laterality: Right;    SURGICAL REMOVAL OF BUNION WITH OSTEOTOMY OF METATARSAL BONE Left 05/10/2019    Procedure: BUNIONECTOMY, WITH METATARSAL OSTEOTOMY;  Surgeon: Srinivasan Villanueva DPM;  Location: [facility] OR;  Service: Podiatry;  Laterality: Left;    SURGICAL REMOVAL OF BUNION WITH OSTEOTOMY OF METATARSAL BONE Right 2019    Procedure: BUNIONECTOMY, WITH METATARSAL OSTEOTOMY;  Surgeon: Srinivasan Villanueva DPM;  Location: [facility] OR;  Service: Podiatry;  Laterality: Right;    SURGICAL REMOVAL OF LYMPH NODE Left 5/10/2024    Procedure: EXCISION, LYMPH NODE;  Surgeon: Mike Nuñez MD;  Location: [facility] OR;  Service: Cardiothoracic;  Laterality: Left;    TONSILLECTOMY, ADENOIDECTOMY  1980s    TRANSESOPHAGEAL ECHOCARDIOGRAM WITH POSSIBLE CARDIOVERSION (LAKESHIA W/ POSS CARDIOVERSION) N/A 5/15/2024    Procedure: Transesophageal echo (LAKESHIA) intra-procedure log documentation;  Surgeon: Twan Pelaez MD;  Location: [facility] CATH LAB;  Service: Cardiology;  Laterality: N/A;    TRIGGER FINGER RELEASE Right 2015    Dr. Pedraza    XI ROBOTIC RATS,WITH LOBECTOMY,LUNG Left 5/10/2024    Procedure: XI ROBOTIC RATS,WITH LOBECTOMY,LUNG;  Surgeon: Mike Nuñez MD;  Location: [facility] OR;  Service: Cardiothoracic;  Laterality: Left;  Lingulectomy         Family History:  Family History   Problem Relation Name Age of Onset    Prostate cancer Brother      Diabetes Maternal Aunt Alondra Campos     Diabetes Cousin      Hypertension Maternal Grandmother Portia Orosco        Social History:  Social History     Tobacco Use    Smoking status: Former     Current packs/day: 0.00     Average packs/day: 0.5 packs/day for 42.0 years (21.0 ttl pk-yrs)     Types: Cigarettes     Start date: 1970     Quit date: 2012     Years since quittin.5     Passive exposure: Past    Smokeless tobacco: Never   Substance and  Sexual Activity    Alcohol use: Not Currently     Alcohol/week: 1.0 standard drink of alcohol     Types: 1 Glasses of wine per week     Comment: Glass red wine once a every 2 weeks    Drug use: Never    Sexual activity: Not Currently     Partners: Female     Birth control/protection: Abstinence, None        Review of Systems     Review of Systems   Constitutional: Negative.    HENT: Negative.     Eyes: Negative.    Respiratory: Negative.     Cardiovascular: Negative.    Gastrointestinal: Negative.    Endocrine: Negative.    Genitourinary: Negative.    Musculoskeletal: Negative.    Skin: Negative.    Allergic/Immunologic: Negative.    Neurological:  Positive for weakness (generalized). Negative for syncope.   Hematological: Negative.    Psychiatric/Behavioral: Negative.     All other systems reviewed and are negative.     Physical Exam     Initial Vitals   BP Pulse Resp Temp SpO2   07/27/24 1727 07/27/24 1727 07/27/24 1727 07/27/24 1734 07/27/24 1727   128/87 (!) 119 19 98.2 °F (36.8 °C) 97 %      MAP       --                 Physical Exam  Nursing Notes and Vital Signs Reviewed.   Constitutional: Patient is in no acute distress. Well-developed and well-nourished. Somnolent but arousable. Keeps falling asleep during exam. Hungry.   Head: Atraumatic. Normocephalic.  Eyes: PERRL. EOM intact. Conjunctivae are not pale. No scleral icterus.  ENT: Mucous membranes are moist. Oropharynx is clear and symmetric.    Neck: Supple. Full ROM. No lymphadenopathy.  Cardiovascular: Regular rate. Regular rhythm. No murmurs, rubs, or gallops. Distal pulses are 2+ and symmetric.  Pulmonary/Chest: No respiratory distress. Clear to auscultation bilaterally. No wheezing or rales.  Abdominal: Soft and non-distended.  There is no tenderness.  No rebound, guarding, or rigidity. Good bowel sounds.  Genitourinary: No CVA tenderness  Musculoskeletal: Moves all extremities. No obvious deformities. No edema. No calf tenderness.  Skin: Warm and  dry.  Neurological:  Awake.  Normal speech.  No acute focal neurological deficits are appreciated.  Psychiatric: Normal affect. Good eye contact. Appropriate in content.     ED Course   Procedures  ED Vital Signs:  Vitals:    07/27/24 1727 07/27/24 1734 07/27/24 1900 07/27/24 1930   BP: 128/87  135/85 123/85   Pulse: (!) 119  107 107   Resp: 19  18 18   Temp:  98.2 °F (36.8 °C) 98.2 °F (36.8 °C) 98.2 °F (36.8 °C)   TempSrc:  Oral     SpO2: 97%  96% 96%    07/27/24 2000 07/27/24 2100 07/27/24 2130   BP: (!) 122/95 (!) 156/130 (!) 163/84   Pulse: 106 105 104   Resp: 16 17 18   Temp:   98.1 °F (36.7 °C)   TempSrc:   Oral   SpO2: 96% 95% 96%       Abnormal Lab Results:  Labs Reviewed   CBC W/ AUTO DIFFERENTIAL - Abnormal       Result Value    WBC 6.31      RBC 5.37      Hemoglobin 13.2      Hematocrit 39.3      MCV 73 (*)     MCH 24.6 (*)     MCHC 33.6      RDW 15.4 (*)     Platelets 308      MPV 9.0 (*)     Immature Granulocytes 0.3      Gran # (ANC) 3.8      Immature Grans (Abs) 0.02      Lymph # 1.7      Mono # 0.7      Eos # 0.0      Baso # 0.02      nRBC 0      Gran % 60.7      Lymph % 27.3      Mono % 10.9      Eosinophil % 0.5      Basophil % 0.3      Differential Method Automated     COMPREHENSIVE METABOLIC PANEL - Abnormal    Sodium 134 (*)     Potassium 4.0      Chloride 102      CO2 21 (*)     Glucose 90      BUN 7 (*)     Creatinine 0.6      Calcium 9.6      Total Protein 8.1      Albumin 3.0 (*)     Total Bilirubin 0.3      Alkaline Phosphatase 89      AST 20      ALT 19      eGFR >60      Anion Gap 11     URINALYSIS    Specimen UA Urine, Clean Catch      Color, UA Yellow      Appearance, UA Clear      pH, UA 7.0      Specific Gravity, UA 1.010      Protein, UA Negative      Glucose, UA Negative      Ketones, UA Negative      Bilirubin (UA) Negative      Occult Blood UA Negative      Nitrite, UA Negative      Urobilinogen, UA Negative      Leukocytes, UA Negative      Narrative:     Specimen Source->Urine    TROPONIN I    Troponin I <0.006     B-TYPE NATRIURETIC PEPTIDE    BNP <10     DRUG SCREEN PANEL, URINE EMERGENCY    Benzodiazepines Negative      Methadone metabolites Negative      Cocaine (Metab.) Negative      Opiate Scrn, Ur Negative      Barbiturate Screen, Ur Negative      Amphetamine Screen, Ur Negative      THC Negative      Phencyclidine Negative      Creatinine, Urine 63.3      Toxicology Information SEE COMMENT      Narrative:     Specimen Source->Urine        All Lab Results:  Results for orders placed or performed during the hospital encounter of 07/27/24   CBC auto differential   Result Value Ref Range    WBC 6.31 3.90 - 12.70 K/uL    RBC 5.37 4.00 - 5.40 M/uL    Hemoglobin 13.2 12.0 - 16.0 g/dL    Hematocrit 39.3 37.0 - 48.5 %    MCV 73 (L) 82 - 98 fL    MCH 24.6 (L) 27.0 - 31.0 pg    MCHC 33.6 32.0 - 36.0 g/dL    RDW 15.4 (H) 11.5 - 14.5 %    Platelets 308 150 - 450 K/uL    MPV 9.0 (L) 9.2 - 12.9 fL    Immature Granulocytes 0.3 0.0 - 0.5 %    Gran # (ANC) 3.8 1.8 - 7.7 K/uL    Immature Grans (Abs) 0.02 0.00 - 0.04 K/uL    Lymph # 1.7 1.0 - 4.8 K/uL    Mono # 0.7 0.3 - 1.0 K/uL    Eos # 0.0 0.0 - 0.5 K/uL    Baso # 0.02 0.00 - 0.20 K/uL    nRBC 0 0 /100 WBC    Gran % 60.7 38.0 - 73.0 %    Lymph % 27.3 18.0 - 48.0 %    Mono % 10.9 4.0 - 15.0 %    Eosinophil % 0.5 0.0 - 8.0 %    Basophil % 0.3 0.0 - 1.9 %    Differential Method Automated    Comprehensive metabolic panel   Result Value Ref Range    Sodium 134 (L) 136 - 145 mmol/L    Potassium 4.0 3.5 - 5.1 mmol/L    Chloride 102 95 - 110 mmol/L    CO2 21 (L) 23 - 29 mmol/L    Glucose 90 70 - 110 mg/dL    BUN 7 (L) 8 - 23 mg/dL    Creatinine 0.6 0.5 - 1.4 mg/dL    Calcium 9.6 8.7 - 10.5 mg/dL    Total Protein 8.1 6.0 - 8.4 g/dL    Albumin 3.0 (L) 3.5 - 5.2 g/dL    Total Bilirubin 0.3 0.1 - 1.0 mg/dL    Alkaline Phosphatase 89 55 - 135 U/L    AST 20 10 - 40 U/L    ALT 19 10 - 44 U/L    eGFR >60 >60 mL/min/1.73 m^2    Anion Gap 11 8 - 16 mmol/L    Urinalysis - Clean Catch   Result Value Ref Range    Specimen UA Urine, Clean Catch     Color, UA Yellow Yellow, Straw, Yono    Appearance, UA Clear Clear    pH, UA 7.0 5.0 - 8.0    Specific Gravity, UA 1.010 1.005 - 1.030    Protein, UA Negative Negative    Glucose, UA Negative Negative    Ketones, UA Negative Negative    Bilirubin (UA) Negative Negative    Occult Blood UA Negative Negative    Nitrite, UA Negative Negative    Urobilinogen, UA Negative <2.0 EU/dL    Leukocytes, UA Negative Negative   Troponin I   Result Value Ref Range    Troponin I <0.006 0.000 - 0.026 ng/mL   B-Type natriuretic peptide (BNP)   Result Value Ref Range    BNP <10 0 - 99 pg/mL   Drug screen panel, emergency   Result Value Ref Range    Benzodiazepines Negative Negative    Methadone metabolites Negative Negative    Cocaine (Metab.) Negative Negative    Opiate Scrn, Ur Negative Negative    Barbiturate Screen, Ur Negative Negative    Amphetamine Screen, Ur Negative Negative    THC Negative Negative    Phencyclidine Negative Negative    Creatinine, Urine 63.3 15.0 - 325.0 mg/dL    Toxicology Information SEE COMMENT    EKG 12-lead   Result Value Ref Range    QRS Duration 84 ms    [facility] QTC Calculation 444 ms     *Note: Due to a large number of results and/or encounters for the requested time period, some results have not been displayed. A complete set of results can be found in Results Review.       Imaging Results:  Imaging Results              CT Head Without Contrast (Final result)  Result time 07/27/24 19:56:38      Final result by Alisha Nichols MD (07/27/24 19:56:38)                   Impression:      No acute finding      Electronically signed by: Alisha Nichols  Date:    07/27/2024  Time:    19:56               Narrative:    EXAMINATION:  CT HEAD WITHOUT CONTRAST    CLINICAL HISTORY:  Mental status change, unknown cause;    TECHNIQUE:  Low dose axial images were obtained through the head.  Coronal and sagittal reformations  were also performed. Contrast was not administered.    COMPARISON:  None.    FINDINGS:  No acute intracranial hemorrhage or mass effect.  Chronic findings present.  No convincing hydrocephalus.  No displaced acute bony finding                                       X-Ray Chest AP Portable (Final result)  Result time 07/27/24 18:52:03      Final result by Alisha Nichols MD (07/27/24 18:52:03)                   Impression:      Improved lung aeration      Electronically signed by: Alisha Nichols  Date:    07/27/2024  Time:    18:52               Narrative:    EXAMINATION:  XR CHEST AP PORTABLE    CLINICAL HISTORY:  Chest Pain;    TECHNIQUE:  Single frontal portable view of the chest was performed.    COMPARISON:  05/13/2020    FINDINGS:  Or lungs are better aerated with no new pulmonary opacity identified as visualized with artifact.                                       The EKG was ordered, reviewed, and independently interpreted by the ED provider.  Interpretation time: 18:46  Rate: 109 BPM  Rhythm: sinus tachycardia  Interpretation: Left axis deviation. Moderate voltage criteria for LVH, may be normal variant (R in aVL, Madhav product). Abnormal EKG. No STEMI.           The Emergency Provider reviewed the vital signs and test results, which are outlined above.     ED Discussion     8:00 PM: Dr. Mathews transfers care of patient to Dr. Fernandez pending lab and imaging results.    8:38 PM: Dr. Fernandez re-evaluated pt. Pt is resting comfortably and is in no acute distress. D/w pt all pertinent results. D/w pt any concerns expressed at this time. Answered all questions. Pt expresses understanding at this time.     9:27 PM: Reassessed pt at this time. Discussed with pt all pertinent ED information and results. Discussed pt dx and plan of tx. Gave pt all f/u and return to the ED instructions. All questions and concerns were addressed at this time. Pt expresses understanding of information and instructions, and  "is comfortable with plan to discharge. Pt is stable for discharge.    I discussed with patient and/or family/caretaker that evaluation in the ED does not suggest any emergent or life threatening medical conditions requiring immediate intervention beyond what was provided in the ED, and I believe patient is safe for discharge.  Regardless, an unremarkable evaluation in the ED does not preclude the development or presence of a serious of life threatening condition. As such, patient was instructed to return immediately for any worsening or change in current symptoms.        Medical Decision Making  75 yo female says she was trying to go to the bathroom and fell. She denies any injury or c/o except "I'm hungry".  She is somnolent and keeps falling asleep during the exam.  She is easily aroused and is oriented x 3.   reveals she takes lunesta, lorazepam and recent rx for ultram.  She is tachycardic .  CT brain negative, CXR WNL.    Amount and/or Complexity of Data Reviewed  Labs: ordered. Decision-making details documented in ED Course.  Radiology: ordered and independent interpretation performed. Decision-making details documented in ED Course.  ECG/medicine tests: ordered and independent interpretation performed. Decision-making details documented in ED Course.    Risk  OTC drugs.  Prescription drug management.  Parenteral controlled substances.  Risk Details: OTC drugs, prescription drugs and controlled substances considered.  Due to patient's symptoms improving and pain controlled pain medications ordered appropriately.  Differential diagnosis: Electrolyte abnormality, strains, sprain, fracture, syncope, sepsis, infection, arrhythmia, or dehydration.                  ED Medication(s):  Medications   sodium chloride 0.9% bolus 1,000 mL 1,000 mL (0 mLs Intravenous Stopped 7/27/24 2055)       Discharge Medication List as of 7/27/2024  9:28 PM           Follow-up Information       Emil Zhang MD. Schedule " an appointment as soon as possible for a visit in 1 week.    Specialty: Family Medicine  Contact information:  [address] [facility] [address]  [phone]               [facility] - Emergency Dept..    Specialty: Emergency Medicine  Why: As needed, If symptoms worsen  Contact information:  [address] [facility][1] [address] [phone]             [facility] - Emergency Dept..    Specialty: Emergency Medicine  Why: As needed, If symptoms worsen  Contact information:  [address] [facility][1] [address] [phone]                               Scribe Attestation:   Scribe #1: I performed the above scribed service and the documentation accurately describes the services I performed. I attest to the accuracy of the note.     Attending:   Physician Attestation Statement for Scribe #1: I, Shaila Mathews MD, personally performed the services described in this documentation, as scribed by Hermila Reed, in my presence, and it is both accurate and complete.       Scribe Attestation:   Scribe #2: I performed the above scribed service and the documentation accurately describes the services I performed. I attest to the accuracy of the note.    Attending Attestation:           Physician Attestation for Scribe:    Physician Attestation Statement for Scribe #2: I, Estevan Fernandez Jr., MD, reviewed documentation, as scribed by Latonia Maciel in my presence, and it is both accurate and complete. I also acknowledge and confirm the content of the note done by Scribe #1.           Clinical Impression       ICD-10-CM ICD-9-CM   1. Fall, initial encounter  W19.XXXA E888.9   2. Weakness  R53.1 780.79   3. Somnolence  R40.0 780.09   4. Tachycardia  R00.0 785.0       Disposition:   Disposition: Discharged  Condition: Stable         Estevan Fernandez Jr., MD  07/28/24 0003

## 2024-07-28 LAB
OHS QRS DURATION: 84 MS
OHS QTC CALCULATION: 444 MS

## 2024-07-28 NOTE — DISCHARGE INSTRUCTIONS
Advised to refrain from using sedating medications and Return to ER if symptoms persist or worsen.    Regarding FALL PRECAUTIONS, I advised patient to: exercise regularly, keep all appointments with caregivers and bring updated, current list of all medications, keep diet healthy and include calcium and Vitamin D, and drink plenty of fluids.    Also recommended that patient keep home safe by: keeping pathways clear; have carpet installed in bathroom; have enough lighting to clearly see all pathways; keep all cords secured and out of the way; secure carpeting to the floor around all edges; remove throw rugs, or secure them with double-sided tape or special backing; if using stairs, install sturdy handrails; leave a light on at night to help you find way to the bathroom and kitchen; and select shoes with non-slip soles that are not too big or too small for your feet. Other home safety tips: do not leave objects in the bathtub or on the floor of the shower; install grab bars on walls around tubs or shower enclosures, and next to toilets; and install non-skid mats on shower floors and in the bathtub.

## 2024-07-30 ENCOUNTER — TELEPHONE (OUTPATIENT)
Dept: OBSTETRICS AND GYNECOLOGY | Facility: CLINIC | Age: 74
End: 2024-07-30
Payer: MEDICARE

## 2024-07-30 NOTE — TELEPHONE ENCOUNTER
Contacted patient, verified 2 patient identifiers, gave patient available dates. Patient accepted first available at [facility]. Patient requested to be added to wait list. Patient added to wait list, patient scheduled.

## 2024-08-01 ENCOUNTER — OFFICE VISIT (OUTPATIENT)
Dept: CARDIOTHORACIC SURGERY | Facility: CLINIC | Age: 74
End: 2024-08-01
Payer: MEDICARE

## 2024-08-01 ENCOUNTER — HOSPITAL ENCOUNTER (OUTPATIENT)
Dept: RADIOLOGY | Facility: HOSPITAL | Age: 74
Discharge: HOME | End: 2024-08-01
Attending: THORACIC SURGERY (CARDIOTHORACIC VASCULAR SURGERY)
Payer: MEDICARE

## 2024-08-01 VITALS
HEIGHT: 63 IN | WEIGHT: 165.13 LBS | SYSTOLIC BLOOD PRESSURE: 130 MMHG | BODY MASS INDEX: 29.26 KG/M2 | OXYGEN SATURATION: 98 % | HEART RATE: 117 BPM | DIASTOLIC BLOOD PRESSURE: 84 MMHG

## 2024-08-01 DIAGNOSIS — F32.0 MAJOR DEPRESSIVE DISORDER, SINGLE EPISODE, MILD: ICD-10-CM

## 2024-08-01 DIAGNOSIS — F41.0 PANIC ATTACK AS REACTION TO STRESS: ICD-10-CM

## 2024-08-01 DIAGNOSIS — C34.32 MALIGNANT NEOPLASM OF LOWER LOBE OF LEFT LUNG: ICD-10-CM

## 2024-08-01 DIAGNOSIS — E78.5 HYPERLIPIDEMIA ASSOCIATED WITH TYPE 2 DIABETES MELLITUS: ICD-10-CM

## 2024-08-01 DIAGNOSIS — J45.20 MILD INTERMITTENT ASTHMA WITHOUT COMPLICATION: ICD-10-CM

## 2024-08-01 DIAGNOSIS — E11.69 HYPERLIPIDEMIA ASSOCIATED WITH TYPE 2 DIABETES MELLITUS: ICD-10-CM

## 2024-08-01 DIAGNOSIS — M16.10 HIP ARTHRITIS: ICD-10-CM

## 2024-08-01 DIAGNOSIS — E11.9 CONTROLLED TYPE 2 DIABETES MELLITUS WITHOUT COMPLICATION, WITHOUT LONG-TERM CURRENT USE OF INSULIN: ICD-10-CM

## 2024-08-01 DIAGNOSIS — R91.1 SOLITARY PULMONARY NODULE: Primary | ICD-10-CM

## 2024-08-01 DIAGNOSIS — K21.00 GASTROESOPHAGEAL REFLUX DISEASE WITH ESOPHAGITIS WITHOUT HEMORRHAGE: ICD-10-CM

## 2024-08-01 DIAGNOSIS — F41.9 ANXIETY: ICD-10-CM

## 2024-08-01 DIAGNOSIS — F43.0 PANIC ATTACK AS REACTION TO STRESS: ICD-10-CM

## 2024-08-01 DIAGNOSIS — I10 PRIMARY HYPERTENSION: ICD-10-CM

## 2024-08-01 DIAGNOSIS — R91.1 SOLITARY PULMONARY NODULE: ICD-10-CM

## 2024-08-01 PROCEDURE — 71046 X-RAY EXAM CHEST 2 VIEWS: CPT | Mod: TC

## 2024-08-01 PROCEDURE — 99999 PR PBB SHADOW E&M-EST. PATIENT-LVL IV: CPT | Mod: PBBFAC,,, | Performed by: THORACIC SURGERY (CARDIOTHORACIC VASCULAR SURGERY)

## 2024-08-01 PROCEDURE — 71046 X-RAY EXAM CHEST 2 VIEWS: CPT | Mod: 26,,, | Performed by: RADIOLOGY

## 2024-08-01 NOTE — PROGRESS NOTES
"Subjective:      Patient ID: Cassandra Campos is a 74 y.o. female.    Chief Complaint: No chief complaint on file.    HPI: 74 year-old female ex-smoker I had a adenocarcinoma T1 a N0 M0 status post robotic assisted lingual lobectomy and mediastinal lymph node dissection here for the follow-up visit does not have any major complaints at this time.  Back to her baseline activity.    Review of patient's allergies indicates:  No Known Allergies    Review of Systems   Constitutional:  Negative for activity change and appetite change.   HENT:  Negative for dental problem, nosebleeds and sore throat.    Eyes:  Negative for discharge and visual disturbance.   Respiratory:  Negative for cough, chest tightness and stridor.    Cardiovascular:  Negative for leg swelling.   Gastrointestinal:  Negative for abdominal distention and abdominal pain.   Genitourinary:  Negative for difficulty urinating and dysuria.   Musculoskeletal:  Negative for arthralgias, back pain and joint swelling.   Allergic/Immunologic: Negative for environmental allergies.   Neurological:  Negative for dizziness, syncope and headaches.   Hematological:  Does not bruise/bleed easily.   Psychiatric/Behavioral:  Negative for behavioral problems.           Objective:   /84 (BP Location: Right arm, Patient Position: Sitting, BP Method: Medium (Manual))   Pulse (!) 117   Ht 5' 3" (1.6 m)   Wt 74.9 kg (165 lb 2 oz)   SpO2 98%   BMI 29.25 kg/m²     X-Ray Sacrum And Coccyx  EXAM: XR SACRUM AND COCCYX    CLINICAL HISTORY: [W19.XXXA]-Unspecified fall, initial encounter./[M53.3]-Sacrococcygeal disorders, not elsewhere classified.    FINDINGS:  Osteopenia.  No acute fracture demonstrated.  Vascular calcifications are present.    IMPRESSION:  Osteopenia.  No acute fracture demonstrated.    Finalized on: 8/2/2024 12:54 PM By:  Kingsley Medina MD  [facility]# [id number]      2024-08-02 12:56:52.176    [facility]         Physical Exam  Vitals reviewed.   Constitutional:       " Appearance: Normal appearance.   HENT:      Head: Normocephalic and atraumatic.      Mouth/Throat:      Mouth: Mucous membranes are moist.   Eyes:      Extraocular Movements: Extraocular movements intact.   Cardiovascular:      Rate and Rhythm: Normal rate and regular rhythm.      Pulses: Normal pulses.      Heart sounds: Normal heart sounds.   Pulmonary:      Effort: Pulmonary effort is normal.      Breath sounds: Rhonchi present.   Abdominal:      Palpations: Abdomen is soft.   Musculoskeletal:         General: Normal range of motion.      Cervical back: Normal range of motion and neck supple.   Skin:     General: Skin is warm.      Capillary Refill: Capillary refill takes less than 2 seconds.   Neurological:      General: No focal deficit present.      Mental Status: She is alert and oriented to person, place, and time.   Psychiatric:         Mood and Affect: Mood normal.         Assessment:     1. Solitary pulmonary nodule    2. Hyperlipidemia associated with type 2 diabetes mellitus    3. Primary hypertension    4. Controlled type 2 diabetes mellitus without complication, without long-term current use of insulin    5. Malignant neoplasm of lower lobe of left lung    6. Major depressive disorder, single episode, mild    7. Panic attack as reaction to stress    8. Gastroesophageal reflux disease with esophagitis without hemorrhage    9. Hip arthritis    10. Anxiety    11. Mild intermittent asthma without complication          Plan   74-year-old female status post robot assisted lingual lobectomy and mediastinal lymph node dissection for T 1 a N0 M0 adenocarcinoma of the lung.  Continue to use incentive spirometry activity as tolerated  Follow up with radiation oncology.  I will see her back in 6 months' time with repeat CT scan of the chest with contrast as a follow-up.        Mike Nuñez MD  [facility]  [place]

## 2024-08-02 ENCOUNTER — HOSPITAL ENCOUNTER (OUTPATIENT)
Dept: RADIOLOGY | Facility: HOSPITAL | Age: 74
Discharge: HOME | End: 2024-08-02
Payer: MEDICARE

## 2024-08-02 ENCOUNTER — OFFICE VISIT (OUTPATIENT)
Dept: INTERNAL MEDICINE | Facility: CLINIC | Age: 74
End: 2024-08-02
Payer: MEDICARE

## 2024-08-02 VITALS
WEIGHT: 166.44 LBS | DIASTOLIC BLOOD PRESSURE: 62 MMHG | OXYGEN SATURATION: 96 % | HEART RATE: 106 BPM | BODY MASS INDEX: 29.49 KG/M2 | TEMPERATURE: 99 F | SYSTOLIC BLOOD PRESSURE: 110 MMHG | HEIGHT: 63 IN

## 2024-08-02 DIAGNOSIS — R10.32 BILATERAL GROIN PAIN: ICD-10-CM

## 2024-08-02 DIAGNOSIS — M53.3 COCCYX PAIN: ICD-10-CM
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
## 2024-08-02 DIAGNOSIS — R10.31 BILATERAL GROIN PAIN: ICD-10-CM
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
## 2024-08-02 DIAGNOSIS — R41.3 MEMORY IMPAIRMENT: ICD-10-CM

## 2024-08-02 DIAGNOSIS — I10 PRIMARY HYPERTENSION: ICD-10-CM

## 2024-08-02 PROCEDURE — 72220 X-RAY EXAM SACRUM TAILBONE: CPT | Mod: 26,,, | Performed by: RADIOLOGY

## 2024-08-02 PROCEDURE — 3288F FALL RISK ASSESSMENT DOCD: CPT | Mod: CPTII,S$GLB,,

## 2024-08-02 PROCEDURE — 1159F MED LIST DOCD IN RCRD: CPT | Mod: CPTII,S$GLB,,

## 2024-08-02 PROCEDURE — 3044F HG A1C LEVEL LT 7.0%: CPT | Mod: CPTII,S$GLB,,

## 2024-08-02 PROCEDURE — 72220 X-RAY EXAM SACRUM TAILBONE: CPT | Mod: TC

## 2024-08-02 PROCEDURE — 3074F SYST BP LT 130 MM HG: CPT | Mod: CPTII,S$GLB,,

## 2024-08-02 PROCEDURE — 99999 PR PBB SHADOW E&M-EST. PATIENT-LVL V: CPT | Mod: PBBFAC,,,

## 2024-08-02 PROCEDURE — 1125F AMNT PAIN NOTED PAIN PRSNT: CPT | Mod: CPTII,S$GLB,,

## 2024-08-02 PROCEDURE — 4010F ACE/ARB THERAPY RXD/TAKEN: CPT | Mod: CPTII,S$GLB,,

## 2024-08-02 PROCEDURE — 3078F DIAST BP <80 MM HG: CPT | Mod: CPTII,S$GLB,,

## 2024-08-02 PROCEDURE — 99214 OFFICE O/P EST MOD 30 MIN: CPT | Mod: S$GLB,,,

## 2024-08-02 PROCEDURE — G2211 COMPLEX E/M VISIT ADD ON: HCPCS | Mod: S$GLB,,,

## 2024-08-02 PROCEDURE — 1100F PTFALLS ASSESS-DOCD GE2>/YR: CPT | Mod: CPTII,S$GLB,,

## 2024-08-02 PROCEDURE — 1160F RVW MEDS BY RX/DR IN RCRD: CPT | Mod: CPTII,S$GLB,,

## 2024-08-02 RX ORDER — HYDROCODONE BITARTRATE AND ACETAMINOPHEN 5; 325 MG/1; MG/1
1 TABLET ORAL EVERY 6 HOURS PRN
Qty: 28 TABLET | Refills: 0 | Status: SHIPPED | OUTPATIENT
Start: 2024-08-02

## 2024-08-05 ENCOUNTER — TELEPHONE (OUTPATIENT)
Dept: INTERNAL MEDICINE | Facility: CLINIC | Age: 74
End: 2024-08-05
Payer: MEDICARE

## 2024-08-13 ENCOUNTER — HOSPITAL ENCOUNTER (OUTPATIENT)
Dept: RADIOLOGY | Facility: HOSPITAL | Age: 74
Discharge: HOME | End: 2024-08-13
Payer: MEDICARE

## 2024-08-13 ENCOUNTER — OFFICE VISIT (OUTPATIENT)
Dept: OBSTETRICS AND GYNECOLOGY | Facility: CLINIC | Age: 74
End: 2024-08-13
Payer: MEDICARE

## 2024-08-13 VITALS
HEIGHT: 63 IN | SYSTOLIC BLOOD PRESSURE: 120 MMHG | BODY MASS INDEX: 28.52 KG/M2 | WEIGHT: 160.94 LBS | DIASTOLIC BLOOD PRESSURE: 72 MMHG

## 2024-08-13 DIAGNOSIS — R10.32 BILATERAL GROIN PAIN: Primary | ICD-10-CM

## 2024-08-13 DIAGNOSIS — R10.32 BILATERAL GROIN PAIN: ICD-10-CM

## 2024-08-13 DIAGNOSIS — R10.31 BILATERAL GROIN PAIN: Primary | ICD-10-CM

## 2024-08-13 DIAGNOSIS — R10.31 BILATERAL GROIN PAIN: ICD-10-CM

## 2024-08-13 PROCEDURE — 3288F FALL RISK ASSESSMENT DOCD: CPT | Mod: CPTII,S$GLB,,

## 2024-08-13 PROCEDURE — 3074F SYST BP LT 130 MM HG: CPT | Mod: CPTII,S$GLB,,

## 2024-08-13 PROCEDURE — 76882 US LMTD JT/FCL EVL NVASC XTR: CPT | Mod: TC,LT

## 2024-08-13 PROCEDURE — 99999 PR PBB SHADOW E&M-EST. PATIENT-LVL III: CPT | Mod: PBBFAC,,,

## 2024-08-13 PROCEDURE — 3044F HG A1C LEVEL LT 7.0%: CPT | Mod: CPTII,S$GLB,,

## 2024-08-13 PROCEDURE — 1101F PT FALLS ASSESS-DOCD LE1/YR: CPT | Mod: CPTII,S$GLB,,

## 2024-08-13 PROCEDURE — 3078F DIAST BP <80 MM HG: CPT | Mod: CPTII,S$GLB,,

## 2024-08-13 PROCEDURE — 99212 OFFICE O/P EST SF 10 MIN: CPT | Mod: S$GLB,,,

## 2024-08-13 PROCEDURE — 76882 US LMTD JT/FCL EVL NVASC XTR: CPT | Mod: TC,RT

## 2024-08-13 PROCEDURE — 1159F MED LIST DOCD IN RCRD: CPT | Mod: CPTII,S$GLB,,

## 2024-08-13 PROCEDURE — 1125F AMNT PAIN NOTED PAIN PRSNT: CPT | Mod: CPTII,S$GLB,,

## 2024-08-13 PROCEDURE — 4010F ACE/ARB THERAPY RXD/TAKEN: CPT | Mod: CPTII,S$GLB,,

## 2024-08-13 PROCEDURE — 76882 US LMTD JT/FCL EVL NVASC XTR: CPT | Mod: 26,LT,, | Performed by: RADIOLOGY

## 2024-08-14 NOTE — PROGRESS NOTES
"Subjective:       Patient ID: Cassandra Campos is a 74 y.o. female.    Chief Complaint:  Pelvic Pain      History of Present Illness  Pelvic Pain  The patient's primary symptoms include pelvic pain (bilateral groin pain).       Patient presents with complaints of bilateral groin pain  She had a visit with PCP last week and has bilateral groin US ordered and scheduled for today     When I notified patient of US already being ordered she stated "I forgot, I'm going to go have that done then." She then proceeds to leave the room.       GYN & OB History  No LMP recorded. Patient is postmenopausal.   Date of Last Pap: 2014    OB History    Para Term  AB Living   1 1 1         SAB IAB Ectopic Multiple Live Births                  # Outcome Date GA Lbr Rex/2nd Weight Sex Type Anes PTL Lv   1 Term                Review of Systems  Review of Systems   Genitourinary:  Positive for pelvic pain (bilateral groin pain).           Objective:      Physical Exam:   Constitutional: She is oriented to person, place, and time. She appears well-developed and well-nourished.    HENT:   Head: Normocephalic and atraumatic.    Eyes: Pupils are equal, round, and reactive to light. Conjunctivae and EOM are normal.     Cardiovascular:  Normal rate, regular rhythm and normal heart sounds.             Pulmonary/Chest: Effort normal.        Abdominal: Soft. There is no abdominal tenderness.     Genitourinary:    Genitourinary Comments: deferred             Musculoskeletal: Normal range of motion and moves all extremeties.      Comments: Patient ambulating with cane        Neurological: She is alert and oriented to person, place, and time.    Skin: Skin is warm and dry. She is not diaphoretic.    Psychiatric: She has a normal mood and affect. Her speech is normal. Mood normal. She is hyperactive. She expresses impulsivity.             Assessment:        1. Bilateral groin pain               Plan:   Patient accompanied to the " first floor to have groin US performed   Encouraged to follow up with PCP for results     Diagnosis and orders this visit:  Bilateral groin pain         Pilar Pool, NP

## 2024-08-21 ENCOUNTER — OFFICE VISIT (OUTPATIENT)
Dept: SPORTS MEDICINE | Facility: CLINIC | Age: 74
End: 2024-08-21
Payer: MEDICARE

## 2024-08-21 VITALS — BODY MASS INDEX: 28.35 KG/M2 | HEIGHT: 63 IN | WEIGHT: 160 LBS

## 2024-08-21 DIAGNOSIS — M17.0 PRIMARY OSTEOARTHRITIS OF BOTH KNEES: Primary | ICD-10-CM

## 2024-08-21 PROCEDURE — 20611 DRAIN/INJ JOINT/BURSA W/US: CPT | Mod: 50,S$GLB,, | Performed by: STUDENT IN AN ORGANIZED HEALTH CARE EDUCATION/TRAINING PROGRAM

## 2024-08-21 PROCEDURE — 99999 PR PBB SHADOW E&M-EST. PATIENT-LVL III: CPT | Mod: PBBFAC,,, | Performed by: STUDENT IN AN ORGANIZED HEALTH CARE EDUCATION/TRAINING PROGRAM

## 2024-08-21 PROCEDURE — 99499 UNLISTED E&M SERVICE: CPT | Mod: S$GLB,,, | Performed by: STUDENT IN AN ORGANIZED HEALTH CARE EDUCATION/TRAINING PROGRAM

## 2024-08-21 NOTE — PROCEDURES
Large Joint Aspiration/Injection: bilateral knee    Date/Time: 8/21/2024 1:40 PM    Performed by: Naman Barton MD  Authorized by: Naman Barton MD    Consent Done?:  Yes (Verbal)  Indications:  Arthritis and pain  Site marked: the procedure site was marked    Timeout: prior to procedure the correct patient, procedure, and site was verified    Prep: patient was prepped and draped in usual sterile fashion    Local anesthetic:  Lidocaine 1% without epinephrine  Anesthetic total (ml):  4      Details:  Needle Size:  21 G  Ultrasonic Guidance for needle placement?: Yes    Images are saved and documented.  Approach:  Lateral (superior)  Location:  Knee  Laterality:  Bilateral  Site:  Bilateral knee  Medications (Right):  48 mg hylan g-f 20 48 mg/6 mL  Medications (Left):  48 mg hylan g-f 20 48 mg/6 mL  Patient tolerance:  Patient tolerated the procedure well with no immediate complications     Ultrasound guidance was used for needle localization. Images were saved and stored for documentation. The appropriate structures were visualized. Dynamic visualization of the needle was continuous throughout the procedures and maintained good position.

## 2024-08-21 NOTE — PATIENT INSTRUCTIONS
Assessment:  Cassandra Campos is a 74 y.o. female   Chief Complaint   Patient presents with    Left Knee - Pain    Right Knee - Pain       Encounter Diagnosis   Name Primary?    Primary osteoarthritis of both knees Yes        Plan:  Ultrasound guided bilateral Synvisc One gel injections to knees  Today you received a gel injection. Unlike steroid injections, these gel injections are a lot thicker and can feel different at first.   It is normal to feel some soreness in the first few days because the gel is expanding that joint space.   It is also normal to experience some swelling in the first few days.   If you are feeling discomfort or having swelling, use ice and tylenol to control symptoms.   It is better to keep the knee joint moving so that the gel can get throughout the joint space.   Sometimes it can take a few weeks for the gel injection to start showing noticeable effects. This is normal.   These gel injections can be repeated every 6 months as needed.   Follow up in 2 months    Follow-up: 3 months or sooner if there are problems between now and then.    Thank you for choosing [facility] and Dr. Naman Barton for your orthopedic & sports medicine care. It is our goal to provide you with exceptional care that will help keep you healthy, active, and get you back in the game.    Please do not hesitate to reach out to us via email, phone, or MyChart with any questions, concerns, or feedback.    If you felt that you received exemplary care today, please consider leaving us feedback on [employer] at:  [email or web address]    If you are experiencing pain/discomfort ,or have questions after 5pm and would like to be connected to the [facility] on-call team, please call this number and specify which Sports Medicine provider is treating you: [phone]

## 2024-08-21 NOTE — PROCEDURES
Sports Medicine US - Guidance for Needle Placement    Date/Time: 8/21/2024 1:40 PM    Performed by: Naman Barton MD  Authorized by: Naman Barton MD  Preparation: Patient was prepped and draped in the usual sterile fashion.  Local anesthesia used: no    Anesthesia:  Local anesthesia used: no    Sedation:  Patient sedated: no    Patient tolerance: patient tolerated the procedure well with no immediate complications  Comments: Ultrasound guidance was used for needle localization. Images were saved and stored for documentation. The appropriate structures were visualized. Dynamic visualization of the needle was continuous throughout the procedures and maintained good position.

## 2024-08-27 DIAGNOSIS — E11.9 CONTROLLED TYPE 2 DIABETES MELLITUS WITHOUT COMPLICATION, WITHOUT LONG-TERM CURRENT USE OF INSULIN: ICD-10-CM

## 2024-08-27 RX ORDER — SEMAGLUTIDE 2.68 MG/ML
2 INJECTION, SOLUTION SUBCUTANEOUS
Qty: 3 ML | Refills: 2 | Status: SHIPPED | OUTPATIENT
Start: 2024-08-27 | End: 2025-08-27

## 2024-08-27 NOTE — TELEPHONE ENCOUNTER
Care Due:                  Date            Visit Type   Department     Provider  --------------------------------------------------------------------------------                                EP -                              PRIMARY      [facility] INTERNAL  Last Visit: 06-      CARE ([facility])   ANA Zhang                              EP -                              PRIMARY      [facility] INTERNAL  Next Visit: 08-      CARE ([facility])   ANA Zhang                                                            Last  Test          Frequency    Reason                     Performed    Due Date  --------------------------------------------------------------------------------    HBA1C.......  6 months...  semaglutide..............  05- 11-    [employer] Embedded Care Due Messages. Reference number: [id number].   8/27/2024 9:41:06 AM CDT

## 2024-08-28 ENCOUNTER — HOSPITAL ENCOUNTER (OUTPATIENT)
Dept: CARDIOLOGY | Facility: HOSPITAL | Age: 74
Discharge: HOME | End: 2024-08-28
Attending: INTERNAL MEDICINE
Payer: MEDICARE

## 2024-08-28 ENCOUNTER — OFFICE VISIT (OUTPATIENT)
Dept: INTERNAL MEDICINE | Facility: CLINIC | Age: 74
End: 2024-08-28
Payer: MEDICARE

## 2024-08-28 VITALS
DIASTOLIC BLOOD PRESSURE: 74 MMHG | TEMPERATURE: 99 F | HEIGHT: 63 IN | OXYGEN SATURATION: 95 % | HEART RATE: 117 BPM | SYSTOLIC BLOOD PRESSURE: 128 MMHG | BODY MASS INDEX: 28.16 KG/M2 | WEIGHT: 158.94 LBS

## 2024-08-28 DIAGNOSIS — G89.18 CHEST WALL PAIN FOLLOWING SURGERY: ICD-10-CM

## 2024-08-28 DIAGNOSIS — I47.10 PAROXYSMAL SVT (SUPRAVENTRICULAR TACHYCARDIA): ICD-10-CM

## 2024-08-28 DIAGNOSIS — E11.9 CONTROLLED TYPE 2 DIABETES MELLITUS WITHOUT COMPLICATION, WITHOUT LONG-TERM CURRENT USE OF INSULIN: Primary | ICD-10-CM

## 2024-08-28 DIAGNOSIS — I10 PRIMARY HYPERTENSION: ICD-10-CM

## 2024-08-28 DIAGNOSIS — R07.89 CHEST WALL PAIN FOLLOWING SURGERY: ICD-10-CM

## 2024-08-28 DIAGNOSIS — R60.0 LOCALIZED EDEMA: ICD-10-CM

## 2024-08-28 DIAGNOSIS — I48.3 TYPICAL ATRIAL FLUTTER: ICD-10-CM

## 2024-08-28 DIAGNOSIS — C34.32 MALIGNANT NEOPLASM OF LOWER LOBE OF LEFT LUNG: ICD-10-CM

## 2024-08-28 PROCEDURE — 3074F SYST BP LT 130 MM HG: CPT | Mod: CPTII,S$GLB,, | Performed by: FAMILY MEDICINE

## 2024-08-28 PROCEDURE — 3078F DIAST BP <80 MM HG: CPT | Mod: CPTII,S$GLB,, | Performed by: FAMILY MEDICINE

## 2024-08-28 PROCEDURE — 99214 OFFICE O/P EST MOD 30 MIN: CPT | Mod: S$GLB,,, | Performed by: FAMILY MEDICINE

## 2024-08-28 PROCEDURE — 3288F FALL RISK ASSESSMENT DOCD: CPT | Mod: CPTII,S$GLB,, | Performed by: FAMILY MEDICINE

## 2024-08-28 PROCEDURE — 99999 PR PBB SHADOW E&M-EST. PATIENT-LVL IV: CPT | Mod: PBBFAC,,, | Performed by: FAMILY MEDICINE

## 2024-08-28 PROCEDURE — 1101F PT FALLS ASSESS-DOCD LE1/YR: CPT | Mod: CPTII,S$GLB,, | Performed by: FAMILY MEDICINE

## 2024-08-28 PROCEDURE — 3044F HG A1C LEVEL LT 7.0%: CPT | Mod: CPTII,S$GLB,, | Performed by: FAMILY MEDICINE

## 2024-08-28 PROCEDURE — 1125F AMNT PAIN NOTED PAIN PRSNT: CPT | Mod: CPTII,S$GLB,, | Performed by: FAMILY MEDICINE

## 2024-08-28 PROCEDURE — 1159F MED LIST DOCD IN RCRD: CPT | Mod: CPTII,S$GLB,, | Performed by: FAMILY MEDICINE

## 2024-08-28 PROCEDURE — 4010F ACE/ARB THERAPY RXD/TAKEN: CPT | Mod: CPTII,S$GLB,, | Performed by: FAMILY MEDICINE

## 2024-08-28 RX ORDER — CODEINE PHOSPHATE AND GUAIFENESIN 10; 100 MG/5ML; MG/5ML
5 SOLUTION ORAL 3 TIMES DAILY
Qty: 473 ML | Refills: 0 | Status: SHIPPED | OUTPATIENT
Start: 2024-08-28

## 2024-08-28 NOTE — PROGRESS NOTES
"Patient ID: Cassandra Campos is a 74 y.o. female.    Chief Complaint: Follow-up    History of Present Illness    Ms. Campos presents today for follow up.    She reports continued use of Ozempic with positive results, experiencing weight loss and denying any GI side effects. She takes meloxicam for arthritis pain and wishes to continue. She also takes sleep medication at bedtime, waking up around 4:00 AM after taking it. She requests Centrum Silver vitamins, inquiring about obtaining them through prescription for potential cost savings.    She reports respiratory issues and difficulty coughing up phlegm. Her pulmonologist, Dr. Sheth, advised her to keep her lungs clean. She inquires about obtaining a new breathing device, describing it as a "puffer" that she previously received in the hospital. She is scheduled to see her pulmonologist next week. On auscultation, her breathing sounds good. She denies current inhaler use.    She reports ongoing knee pain despite a previous gel injection from orthopedic surgeon Dr. Ayala. She denies any knee surgery. She also experiences hand swelling bilaterally with difficulty closing her fingers, which she demonstrated during the visit.    She reports not consuming a lot of salt, but mentions eating Jean Noodles, which are 85% salt. She is aware that salt intake causes fluid retention and agrees to reduce her salt intake.    She reports experiencing occasional abdominal discomfort.    ROS:  General: -fever, -chills, -fatigue, -weight gain, +weight loss  Eyes: -vision changes, -redness, -discharge  ENT: -ear pain, -nasal congestion, -sore throat  Cardiovascular: -chest pain, -palpitations, -lower extremity edema she has some swelling of both hands  Respiratory: +cough, -shortness of breath  Gastrointestinal: +abdominal pain, -nausea, -vomiting, -diarrhea, -constipation, -blood in stool  Genitourinary: -dysuria, -hematuria, -frequency  Musculoskeletal: +joint pain, -muscle " pain  Skin: -rash, -lesion  Neurological: -headache, -dizziness, -numbness, -tingling         Physical Exam    General: In no acute distress. Not ill-appearing or diaphoretic.  Neck: Normal thyroid. No cervical lymphadenopathy. 2+ carotid pulses.  Cardiovascular: Normal rate and regular  rhythm. No murmur heard. No gallop.  Mild edema both hands  Pulmonary: Pulmonary effort is normal. No respiratory distress. No wheezing. No rhonchi. No rales.  Abdominal: Soft. Nontender. Nondistended. No mass.  Musculoskeletal: No swelling. No tenderness. No deformity.  Neurological: Alert.  Psychiatric: Mood normal. Behavior normal.         Assessment & Plan    WEIGHT MANAGEMENT:  - Assessed patient's response to Ozempic for weight management, noting positive results without GI side effects.  - Continued Ozempic for weight management.  RESPIRATORY CONCERNS:  - Evaluated breathing concerns and considered guaifenesin for lung clearance as per Dr. Sheth's recommendation.  - Considered spirometry device for lung expansion, deferring to pulmonologist for further evaluation.  - Refilled guaifenesin (cough syrup) for lung clearance.  KNEE PAIN:  - Reviewed knee pain management, noting recent gel injection by Dr. Ayala (orthopedic surgeon) with persistent discomfort.  SWELLING:  - Assessed swelling in patient's fingers, deciding to order labs to investigate potential causes.  - Weighed benefits of continuing meloxicam for arthritis against its fluid retention side effects, opting to continue use with dietary salt reduction.  - Educated on the fluid retention effects of meloxicam and the importance of reducing salt intake to manage swelling.  - Reduced salt intake to help manage fluid retention and swelling.  ARTHRITIS:  - Continued meloxicam for arthritis pain management, as previously prescribed by orthopedist.  MEDICATIONS/SUPPLEMENTS:  - Recommend Centrum Silver as an OTC multivitamin supplement.  LABS:  - Ordered labs to  investigate swelling.  - Ordered urine test.  FOLLOW UP:  - Follow up in approximately 4 months.          1. Controlled type 2 diabetes mellitus without complication, without long-term current use of insulin  Microalbumin/Creatinine Ratio, Urine    Comprehensive Metabolic Panel    Hemoglobin A1C      2. Chest wall pain following surgery  guaiFENesin-codeine 100-10 mg/5 ml (TUSSI-ORGANIDIN NR)  mg/5 mL syrup      3. Primary hypertension        4. Paroxysmal SVT (supraventricular tachycardia)        5. Malignant neoplasm of lower lobe of left lung  guaiFENesin-codeine 100-10 mg/5 ml (TUSSI-ORGANIDIN NR)  mg/5 mL syrup      6. Localized edema                Follow up in about 4 months (around 12/28/2024).    This note was generated with the assistance of ambient listening technology. Verbal consent was obtained by the patient and accompanying visitor(s) for the recording of patient appointment to facilitate this note. I attest to having reviewed and edited the generated note for accuracy, though some syntax or spelling errors may persist. Please contact the author of this note for any clarification.

## 2024-09-09 DIAGNOSIS — R07.89 CHEST WALL PAIN FOLLOWING SURGERY: ICD-10-CM

## 2024-09-09 DIAGNOSIS — G89.18 CHEST WALL PAIN FOLLOWING SURGERY: ICD-10-CM

## 2024-09-09 DIAGNOSIS — C34.32 MALIGNANT NEOPLASM OF LOWER LOBE OF LEFT LUNG: ICD-10-CM

## 2024-09-09 RX ORDER — CODEINE PHOSPHATE AND GUAIFENESIN 10; 100 MG/5ML; MG/5ML
5 SOLUTION ORAL 3 TIMES DAILY
Qty: 473 ML | Refills: 0 | OUTPATIENT
Start: 2024-09-09

## 2024-09-16 ENCOUNTER — TELEPHONE (OUTPATIENT)
Dept: CARDIOLOGY | Facility: CLINIC | Age: 74
End: 2024-09-16
Payer: MEDICARE

## 2024-09-17 ENCOUNTER — TELEPHONE (OUTPATIENT)
Dept: CARDIOLOGY | Facility: CLINIC | Age: 74
End: 2024-09-17
Payer: MEDICARE

## 2024-09-17 NOTE — TELEPHONE ENCOUNTER
LVM for pt to call back in regards to test results.       ----- Message from Twan Pelaez MD sent at 9/16/2024  7:51 PM CDT -----  The 2 weeks monitor showed rare arrhythmia  Ok to  hold eliquis

## 2024-09-26 ENCOUNTER — PATIENT MESSAGE (OUTPATIENT)
Dept: INTERNAL MEDICINE | Facility: CLINIC | Age: 74
End: 2024-09-26
Payer: MEDICARE

## 2024-09-26 DIAGNOSIS — C34.32 MALIGNANT NEOPLASM OF LOWER LOBE OF LEFT LUNG: ICD-10-CM

## 2024-09-26 DIAGNOSIS — G89.18 CHEST WALL PAIN FOLLOWING SURGERY: ICD-10-CM

## 2024-09-26 DIAGNOSIS — R07.89 CHEST WALL PAIN FOLLOWING SURGERY: ICD-10-CM

## 2024-09-27 RX ORDER — TRIAMCINOLONE ACETONIDE 0.25 MG/ML
1 LOTION TOPICAL 2 TIMES DAILY
Qty: 60 ML | Refills: 0 | Status: SHIPPED | OUTPATIENT
Start: 2024-09-27

## 2024-09-27 RX ORDER — CODEINE PHOSPHATE AND GUAIFENESIN 10; 100 MG/5ML; MG/5ML
5 SOLUTION ORAL 3 TIMES DAILY
Qty: 473 ML | Refills: 0 | Status: SHIPPED | OUTPATIENT
Start: 2024-09-27

## 2024-09-28 NOTE — PROGRESS NOTES
Discharge instructions:   -Do not place anything (no partner, sex toys, tampons, douche, etc.) in your vagina for 6 weeks  -You may walk for exercise for the first 6 weeks then gradually return to your usual activities   -Please do not drive for 1 week if you have no stitches and for 2 weeks if you have stitches    -Please do not drive if you are taking any narcotic medications  -You may take baths or shower per your preference   -Please examine your breasts in the mirror daily and call your doctor for redness, tenderness, increased warmth, fevers, or chills  -Please call your doctor's office for temperature > 100.4*F or 38*C, worsening pain, increased bleeding (filling 2 or more maxi pads within 1 hr or less for at least 2 hrs), foul-smelling discharge/drainage, burning with urination, or symptoms of depression   [facility] STATIN REPORT: NO LONGER ON STATIN MED DUE TO ALLERGY & ELEVATED LIVER ENZYMES

## 2024-09-30 ENCOUNTER — OFFICE VISIT (OUTPATIENT)
Dept: INTERNAL MEDICINE | Facility: CLINIC | Age: 74
End: 2024-09-30
Payer: MEDICARE

## 2024-09-30 VITALS
WEIGHT: 148.56 LBS | OXYGEN SATURATION: 96 % | DIASTOLIC BLOOD PRESSURE: 64 MMHG | HEART RATE: 106 BPM | HEIGHT: 63 IN | TEMPERATURE: 99 F | BODY MASS INDEX: 26.32 KG/M2 | SYSTOLIC BLOOD PRESSURE: 126 MMHG

## 2024-09-30 DIAGNOSIS — N39.0 ACUTE UTI: ICD-10-CM

## 2024-09-30 DIAGNOSIS — G56.03 BILATERAL CARPAL TUNNEL SYNDROME: ICD-10-CM

## 2024-09-30 DIAGNOSIS — Z23 NEED FOR VACCINATION: ICD-10-CM

## 2024-09-30 DIAGNOSIS — M19.049 ARTHRITIS OF HAND: Primary | ICD-10-CM

## 2024-09-30 DIAGNOSIS — J18.9 PNEUMONIA OF RIGHT LOWER LOBE DUE TO INFECTIOUS ORGANISM: ICD-10-CM

## 2024-09-30 PROCEDURE — 3066F NEPHROPATHY DOC TX: CPT | Mod: CPTII,S$GLB,,

## 2024-09-30 PROCEDURE — 99214 OFFICE O/P EST MOD 30 MIN: CPT | Mod: S$GLB,,,

## 2024-09-30 PROCEDURE — G0008 ADMIN INFLUENZA VIRUS VAC: HCPCS | Mod: S$GLB,,,

## 2024-09-30 PROCEDURE — 1160F RVW MEDS BY RX/DR IN RCRD: CPT | Mod: CPTII,S$GLB,,

## 2024-09-30 PROCEDURE — G2211 COMPLEX E/M VISIT ADD ON: HCPCS | Mod: S$GLB,,,

## 2024-09-30 PROCEDURE — 3288F FALL RISK ASSESSMENT DOCD: CPT | Mod: CPTII,S$GLB,,

## 2024-09-30 PROCEDURE — 3044F HG A1C LEVEL LT 7.0%: CPT | Mod: CPTII,S$GLB,,

## 2024-09-30 PROCEDURE — 99999 PR PBB SHADOW E&M-EST. PATIENT-LVL IV: CPT | Mod: PBBFAC,,,

## 2024-09-30 PROCEDURE — 4010F ACE/ARB THERAPY RXD/TAKEN: CPT | Mod: CPTII,S$GLB,,

## 2024-09-30 PROCEDURE — 1125F AMNT PAIN NOTED PAIN PRSNT: CPT | Mod: CPTII,S$GLB,,

## 2024-09-30 PROCEDURE — 3060F POS MICROALBUMINURIA REV: CPT | Mod: CPTII,S$GLB,,

## 2024-09-30 PROCEDURE — 1159F MED LIST DOCD IN RCRD: CPT | Mod: CPTII,S$GLB,,

## 2024-09-30 PROCEDURE — 90653 IIV ADJUVANT VACCINE IM: CPT | Mod: S$GLB,,,

## 2024-09-30 PROCEDURE — 1100F PTFALLS ASSESS-DOCD GE2>/YR: CPT | Mod: CPTII,S$GLB,,

## 2024-09-30 PROCEDURE — 3074F SYST BP LT 130 MM HG: CPT | Mod: CPTII,S$GLB,,

## 2024-09-30 PROCEDURE — 3078F DIAST BP <80 MM HG: CPT | Mod: CPTII,S$GLB,,

## 2024-09-30 RX ORDER — TRIAMCINOLONE ACETONIDE 0.25 MG/ML
1 LOTION TOPICAL 2 TIMES DAILY
Qty: 60 ML | Refills: 0 | Status: SHIPPED | OUTPATIENT
Start: 2024-09-30

## 2024-09-30 RX ORDER — TRIAMCINOLONE ACETONIDE 0.25 MG/ML
1 LOTION TOPICAL 2 TIMES DAILY
Qty: 60 ML | Refills: 0 | Status: CANCELLED | OUTPATIENT
Start: 2024-09-30

## 2024-09-30 RX ORDER — DICLOFENAC SODIUM 10 MG/G
2 GEL TOPICAL DAILY
Qty: 400 G | Refills: 1 | Status: SHIPPED | OUTPATIENT
Start: 2024-09-30

## 2024-09-30 NOTE — TELEPHONE ENCOUNTER
No care due was identified.  [facility] Embedded Care Due Messages. Reference number: [id number].   9/30/2024 11:50:43 AM CDT

## 2024-09-30 NOTE — PROGRESS NOTES
Cassandra Campos  09/30/2024  [id number]    Emil Zhang MD  Patient Care Team:  Emil Zhang MD as PCP - General (Family Medicine)  Duane Davila MD (Cardiovascular Disease)  Sultana Sofia MD as Consulting Physician (Dermatology)  Eduardo Lawrence MD (Ophthalmology)  Twan Pelaez MD as Consulting Physician (Cardiology)  Deanna Soto DPM as Consulting Physician (Podiatry)  Rashaad Watson MD (Inactive) as Consulting Physician (General Surgery)          Visit Type:an urgent visit for an existing problem     Chief Complaint:  Chief Complaint   Patient presents with    Hand Problem     Swelling in hand and fingers.       History of Present Illness:    History of Present Illness    CHIEF COMPLAINT:  Ms. Campos presents today for weakness in hand.    HAND AND WRIST PAIN:  She reports hand weakness that started about two weeks ago. She was previously given a wrist brace by Dr. Oswald, which exacerbated her pain. She denies any improvement with the previously prescribed treatment. She was previously recommended Voltaren gel for hand pain, but currently does not have the medication.    ARTHRITIS:  She has a history of arthritis with swelling in both fingers and hands. She was diagnosed with osteoarthritis changes in multiple joints.   She was prescribed a steroid cream  by Dr. Zhang for dermatitis     HIP PAIN:  She reports experiencing hip pain and has been seen by orthopedic surgery for this issue.    PREVENTIVE CARE:  She plans to receive her flu vaccine at the clinic today and intends to get her COVID booster at the pharmacy.    UPCOMING APPOINTMENTS:  She is scheduled to see pulm on Thursday morning     After reviewing the patient's chart  She was diagnosed with a UTI and PNA on last week at [facility]'s       ROS:  General: -fever, -chills, -fatigue, -weight gain, -weight loss  Eyes: -vision changes, -redness, -discharge  ENT: -ear pain, -nasal congestion, -sore throat  Cardiovascular: -chest pain,  -palpitations, -lower extremity edema  Respiratory: -cough, -shortness of breath  Gastrointestinal: -abdominal pain, -nausea, -vomiting, -diarrhea, -constipation, -blood in stool  Genitourinary: -dysuria, -hematuria, -frequency  Musculoskeletal: +joint pain, -muscle pain  Skin: -rash, -lesion  Neurological: -headache, -dizziness, -numbness, -tingling, +weakness  Psychiatric: -anxiety, -depression, -sleep difficulty            History:  Past Medical History:   Diagnosis Date    Arthritis     hands    Bilateral bunions     Borderline glaucoma     De Quervain's disease (radial styloid tenosynovitis)     Gastritis     upper GI 2017    Hydradenitis     Hyperlipidemia     Hypertension     Insomnia     Migraines 2000    Nasal septum perforation     Obesity     Pneumonia     Restrictive airway disease     Sleep apnea     SVT (supraventricular tachycardia) 2013    Trigger finger     Type 2 diabetes mellitus      am 2024     Past Surgical History:   Procedure Laterality Date    AXILLARY HIDRADENITIS EXCISION Bilateral     BONE EXOSTOSIS EXCISION Right 2018    Procedure: EXCISION, EXOSTOSIS;  Surgeon: Jayro Pedraza Sr., MD;  Location: [facility] OR;  Service: Orthopedics;  Laterality: Right;    BREAST BIOPSY Bilateral     both benign    BREAST SURGERY  1998    CARPAL TUNNEL RELEASE      bilateral    CARPAL TUNNEL RELEASE Right 2024    Procedure: RELEASE, CARPAL TUNNEL;  Surgeon: Jaime Oswald MD;  Location: [facility] OR;  Service: Orthopedics;  Laterality: Right;    CARPAL TUNNEL RELEASE Left 2024    Procedure: RELEASE, CARPAL TUNNEL;  Surgeon: Jaime Oswald MD;  Location: [facility] OR;  Service: Orthopedics;  Laterality: Left;    CATARACT EXTRACTION Bilateral     OU     SECTION  1979    CHOLECYSTECTOMY  2014    COLONOSCOPY N/A 10/02/2020    Procedure: COLONOSCOPY;  Surgeon: Tushar Edwards MD;  Location: [facility] ENDO;  Service: Endoscopy;  Laterality: N/A;     COLONOSCOPY W/ POLYPECTOMY  10/02/2020    Polyps x3, repeat 5 years; Tushar Edwards MD     CYST REMOVAL  07/2015    sebaceous cyst removed from face    DE QUERVAIN'S RELEASE Left 01/16/2020    Procedure: RELEASE, HAND, FOR DEQUERVAIN'S TENOSYNOVITIS;  Surgeon: Jaime Oswald MD;  Location: [facility] OR;  Service: Orthopedics;  Laterality: Left;    DE QUERVAIN'S RELEASE Right 11/20/2020    Procedure: RELEASE, HAND, FOR DEQUERVAIN'S TENOSYNOVITIS;  Surgeon: Jaime Oswald MD;  Location: [facility] OR;  Service: Orthopedics;  Laterality: Right;    ENDOBRONCHIAL ULTRASOUND Bilateral 04/10/2024    Procedure: ENDOBRONCHIAL ULTRASOUND (EBUS);  Surgeon: Adrian Robin MD;  Location: [facility];  Service: Pulmonary;  Laterality: Bilateral;    EYE SURGERY      gastric sleeve  02/13/2017    Dr. Watson    INJECTION OF ANESTHETIC AGENT AROUND MULTIPLE INTERCOSTAL NERVES  5/10/2024    Procedure: BLOCK, NERVE, INTERCOSTAL, 2 OR MORE;  Surgeon: Mike Nuñez MD;  Location: [facility];  Service: Cardiothoracic;;    KNEE SURGERY Right     OLECRANON BURSECTOMY Right 07/25/2018    Procedure: BURSECTOMY, OLECRANON;  Surgeon: Jayro Pedraza Sr., MD;  Location: [facility];  Service: Orthopedics;  Laterality: Right;    SURGICAL REMOVAL OF BUNION WITH OSTEOTOMY OF METATARSAL BONE Left 05/10/2019    Procedure: BUNIONECTOMY, WITH METATARSAL OSTEOTOMY;  Surgeon: Srinivasan Villanueva DPM;  Location: [facility] OR;  Service: Podiatry;  Laterality: Left;    SURGICAL REMOVAL OF BUNION WITH OSTEOTOMY OF METATARSAL BONE Right 06/28/2019    Procedure: BUNIONECTOMY, WITH METATARSAL OSTEOTOMY;  Surgeon: Srinivasan Villanueva DPM;  Location: [facility] OR;  Service: Podiatry;  Laterality: Right;    SURGICAL REMOVAL OF LYMPH NODE Left 5/10/2024    Procedure: EXCISION, LYMPH NODE;  Surgeon: Mike Nuñez MD;  Location: [facility];  Service: Cardiothoracic;  Laterality: Left;    TONSILLECTOMY, ADENOIDECTOMY  1980s    TRANSESOPHAGEAL ECHOCARDIOGRAM WITH POSSIBLE  CARDIOVERSION (LAKESHIA W/ POSS CARDIOVERSION) N/A 5/15/2024    Procedure: Transesophageal echo (LAKESHIA) intra-procedure log documentation;  Surgeon: Twan Pelaez MD;  Location: [facility] CATH LAB;  Service: Cardiology;  Laterality: N/A;    TRIGGER FINGER RELEASE Right 2015    Dr. Milo HALL ROBOTIC RATS,WITH LOBECTOMY,LUNG Left 5/10/2024    Procedure: XI ROBOTIC RATS,WITH LOBECTOMY,LUNG;  Surgeon: Mike Nuñez MD;  Location: [facility] OR;  Service: Cardiothoracic;  Laterality: Left;  Lingulectomy     Family History   Problem Relation Name Age of Onset    Prostate cancer Brother      Diabetes Maternal Aunt Alondra Campos     Diabetes Cousin      Hypertension Maternal Grandmother Portia Orosco      Social History     Socioeconomic History    Marital status:     Number of children: 1   Occupational History    Occupation:  aid   Tobacco Use    Smoking status: Former     Current packs/day: 0.00     Average packs/day: 0.5 packs/day for 42.0 years (21.0 ttl pk-yrs)     Types: Cigarettes     Start date: 1970     Quit date: 2012     Years since quittin.7     Passive exposure: Past    Smokeless tobacco: Never   Substance and Sexual Activity    Alcohol use: Not Currently     Alcohol/week: 1.0 standard drink of alcohol     Types: 1 Glasses of wine per week     Comment: Glass red wine once a every 2 weeks    Drug use: Never    Sexual activity: Not Currently     Partners: Female     Birth control/protection: Abstinence, None   Social History Narrative    Single, part-time teacher. Masters degree biology.      Social Drivers of Health     Financial Resource Strain: Low Risk  (2023)    Overall Financial Resource Strain (CARDIA)     Difficulty of Paying Living Expenses: Not hard at all   Food Insecurity: Food Insecurity Present (2023)    Hunger Vital Sign     Worried About Running Out of Food in the Last Year: Never true     Ran Out of Food in the Last Year: Sometimes true   Transportation  Needs: No Transportation Needs (12/19/2023)    PRAPARE - Transportation     Lack of Transportation (Medical): No     Lack of Transportation (Non-Medical): No   Physical Activity: Insufficiently Active (12/19/2023)    Exercise Vital Sign     Days of Exercise per Week: 3 days     Minutes of Exercise per Session: 20 min   Stress: No Stress Concern Present (12/19/2023)    [employer] [employer] - Occupational Stress Questionnaire     Feeling of Stress : Not at all   Housing Stability: Low Risk  (12/19/2023)    Housing Stability Vital Sign     Unable to Pay for Housing in the Last Year: No     Number of Places Lived in the Last Year: 1     Unstable Housing in the Last Year: No     Patient Active Problem List   Diagnosis    Hyperlipidemia associated with type 2 diabetes mellitus    Insomnia    Hip arthritis    Obesity    Paroxysmal SVT (supraventricular tachycardia)    GERD (gastroesophageal reflux disease)    Prepatellar bursitis of right knee    Transient synovitis of right knee    Chondromalacia of right knee    Primary hypertension    Calcified granuloma of lung    Osteopenia    Onychomycosis of multiple toenails with type 2 diabetes mellitus    Cough    Controlled type 2 diabetes mellitus without complication, without long-term current use of insulin    Secondary hypertension    Unequal blood pressures in paired extremities    De Quervain's disease (radial styloid tenosynovitis)    Breast screening    Tenosynovitis, de Quervain    Skin tag    Panic attack as reaction to stress    Mild intermittent asthma    BMI 31.0-31.9,adult    Elevated liver enzymes    Iron deficiency    Aortic atherosclerosis    Anxiety    Bilateral carpal tunnel syndrome    Major depressive disorder, single episode, mild    Solitary pulmonary nodule    Pelvic pain    Abdominal wall mass    Hilar mass    Hilar adenopathy    Malignant neoplasm of lower lobe of left lung    Preop cardiovascular exam    Calcification of aorta     Chronic bronchitis, unspecified chronic bronchitis type    Fall    Controlled type 2 diabetes mellitus without complication, with long-term current use of insulin    Typical atrial flutter    Hand swelling    Weakness    Somnolence    Tachycardia    Localized edema     Review of patient's allergies indicates:  No Known Allergies    The following were reviewed at this visit: active problem list, medication list, allergies, family history, social history, and health maintenance.    Medications:  Current Outpatient Medications on File Prior to Visit   Medication Sig Dispense Refill    albuterol (PROVENTIL/VENTOLIN HFA) 90 mcg/actuation inhaler INHALE 2 PUFFS INTO THE LUNGS EVERY 4 HOURS AS NEEDED FOR WHEEZING OR SHORTNESS OF BREATH. 18 g 1    amitriptyline (ELAVIL) 100 MG tablet Take 1 tablet (100 mg total) by mouth every evening. 90 tablet 1    blood-glucose meter (ACCU-CHEK GUIDE ME GLUCOSE MTR) Misc use to check blood glucose 2 times a day 1 each 0    blood-glucose sensor (DEXCOM G7 SENSOR) Wilma 1 each by Misc.(Non-Drug; Combo Route) route continuous. 12 each 3    eszopiclone (LUNESTA) 3 mg Tab Take 1 tablet (3 mg total) by mouth every evening. 30 tablet 2    guaiFENesin-codeine 100-10 mg/5 ml (TUSSI-ORGANIDIN NR)  mg/5 mL syrup Take 5 mLs by mouth 3 (three) times daily. 473 mL 0    HYDROcodone-acetaminophen (NORCO) 5-325 mg per tablet Take 1 tablet by mouth every 6 (six) hours as needed for Pain. 28 tablet 0    LIDOcaine (LIDODERM) 5 % Place 1 patch onto the skin once daily. Remove & Discard patch within 12 hours or as directed by MD 15 patch 1    LORazepam (ATIVAN) 1 MG tablet Take 1 tablet (1 mg total) by mouth 2 (two) times daily. 60 tablet 2    losartan (COZAAR) 25 MG tablet TAKE 1 TABLET BY MOUTH ONCE  DAILY (Patient taking differently: Take 25 mg by mouth every evening.) 90 tablet 3    meloxicam (MOBIC) 15 MG tablet Take 1 tablet by mouth daily for 14 days, then take 1 tablet by mouth daily as needed.  30 tablet 1    meloxicam (MOBIC) 15 MG tablet Take 1 tablet by mouth daily for 14 days, then daily as needed. 30 tablet 1    metoprolol succinate (TOPROL-XL) 50 MG 24 hr tablet TAKE 1 TABLET BY MOUTH ONCE  DAILY 90 tablet 3    omeprazole (PRILOSEC) 20 MG capsule TAKE 1 CAPSULE BY MOUTH ONCE  DAILY 90 capsule 3    semaglutide (OZEMPIC) 2 mg/dose (8 mg/3 mL) PnIj Inject 2 mg into the skin every 7 days. 3 mL 2    [DISCONTINUED] triamcinolone acetonide 0.025 % Lotn Apply 1 Dose topically 2 (two) times a day. Apply small amount to affective areas twice a day  take cool showers or baths 60 mL 0     Current Facility-Administered Medications on File Prior to Visit   Medication Dose Route Frequency Provider Last Rate Last Admin    chlorhexidine 0.12 % solution 10 mL  10 mL Mouth/Throat On Call Procedure Falguni Lindquist PA-C        lactated ringers infusion   Intravenous On Call Procedure Dayanna Jones PA   New Bag at 11/20/20 0912    nozaseptin (NOZIN) nasal    Each Nostril On Call Procedure Dayanna Jones PA   Given at 11/20/20 0825    sodium chloride 0.9% flush 10 mL  10 mL Intravenous PRN Mike Nuñez MD        sodium chloride 0.9% flush 10 mL  10 mL Intravenous PRN Mike Nuñez MD           Medications have been reviewed and reconciled with patient at this visit.  Barriers to medications reviewed with patient.    Adverse reactions to current medications reviewed with patient..    Over the counter medications reviewed and reconciled with patient.    Exam:  Wt Readings from Last 3 Encounters:   09/30/24 67.4 kg (148 lb 9.4 oz)   08/28/24 72.1 kg (158 lb 15.2 oz)   08/21/24 72.6 kg (160 lb)     Temp Readings from Last 3 Encounters:   09/30/24 98.5 °F (36.9 °C) (Tympanic)   08/28/24 98.6 °F (37 °C) (Temporal)   08/02/24 98.8 °F (37.1 °C) (Tympanic)     BP Readings from Last 3 Encounters:   09/30/24 126/64   08/28/24 128/74   08/13/24 120/72     Pulse Readings from Last 3 Encounters:   09/30/24  106   08/28/24 (!) 117   08/02/24 106     Body mass index is 26.32 kg/m².    Physical Exam  Nursing note reviewed.   HENT:      Head: Normocephalic and atraumatic.   Cardiovascular:      Rate and Rhythm: Normal rate and regular rhythm.      Pulses:           Radial pulses are 2+ on the right side and 2+ on the left side.      Heart sounds: Normal heart sounds.   Pulmonary:      Effort: Pulmonary effort is normal. No respiratory distress.      Breath sounds: Normal breath sounds.   Abdominal:      General: Bowel sounds are normal.      Tenderness: There is no right CVA tenderness or left CVA tenderness.   Musculoskeletal:         General: Swelling and tenderness present.      Right hand: Swelling present.      Left hand: Swelling present.      Comments: The patient is using an assistive device during the visit     Neurological:      Mental Status: She is alert.      Motor: Weakness present.      Gait: Gait abnormal.   Psychiatric:         Mood and Affect: Mood normal.         Behavior: Behavior normal.         Thought Content: Thought content normal.         Judgment: Judgment normal.           Laboratory Reviewed ({Yes)  Lab Results   Component Value Date    WBC 6.31 07/27/2024    HGB 13.2 07/27/2024    HCT 39.3 07/27/2024     07/27/2024    CHOL 184 12/26/2023    TRIG 128 12/26/2023    HDL 48 12/26/2023    ALT 10 08/28/2024    AST 16 08/28/2024     08/28/2024    K 3.4 (L) 08/28/2024     08/28/2024    CREATININE 0.7 08/28/2024    BUN 6 (L) 08/28/2024    CO2 28 08/28/2024    TSH 3.220 09/12/2022    INR 1.1 05/14/2024    GLUF 121 (H) 12/15/2022    HGBA1C 5.7 (H) 08/28/2024    MICROALBUR 12 08/21/2020       Cassandra was seen today for hand problem.    Diagnoses and all orders for this visit:    Arthritis of hand  -     diclofenac sodium (VOLTAREN ARTHRITIS PAIN) 1 % Gel; Apply 2 g topically once daily.    Bilateral carpal tunnel syndrome    Need for vaccination  -     Influenza - Trivalent  (Adjuvanted)    Acute UTI  -     Urinalysis, Reflex to Urine Culture Urine, Clean Catch; Future    Pneumonia of right lower lobe due to infectious organism        Assessment & Plan    OSTEOARTHRITIS:  - Reviewed patient's history of arthritis and swelling in fingers and hands.  - Noted Dr. Oswald's previous diagnosis of osteoarthritis changes in multiple joints.  - Considered patient's report of hand weakness starting 2 weeks ago.  - Assessed ineffectiveness and discomfort from previously prescribed hand brace.  - Evaluated need for follow-up with Dr. Oswald for hand pain management.  - Started Voltaren gel for hand pain, to be picked up at the clinic pharmacy.  - Referred back to Dr. Oswald for follow-up on hand pain and weakness.    COVID-19 VACCINATION:  - Ms. Campos to get COVID booster at the pharmacy.    INFLUENZA VACCINATION:  - Administered flu vaccine in office.    FOLLOW UP:  - Ms. Campos to schedule follow-up visit with Dr. Oswald at checkout desk.     Pt was recently diagnosed with PNA and UTI  She has an appt with pulm on Thursday    Will order repeat UA as well        Visit today included increased complexity associated with the care of the episodic problem OA bilateral hand pain  , which was addressed while instituting co-management of the longitudinal care of the patient due to the serious and/or complex managed problem(s) .    I have evaluated and discussed management associated with medical care services that serve as the continuing focal point for all needed health care services and/or with medical care services that are part of ongoing care related to my patient's single, serious condition or a complex condition(s).    I am providing ongoing care and I am the primary care provider for this patient, and they are being managed, monitored, and/or observed for their chronic conditions over time.     I have addressed their ongoing health maintenance requirements and needs for all health care  services and reviewed co-management plans provided by specialty providers when available.    Health Maintenance Due   Topic Date Due    COVID-19 Vaccine (7 - 2024-25 season) 09/01/2024          Care Plan/Goals: Reviewed    Goals         Blood Pressure < 140/90 (pt-stated)             Follow up: No follow-ups on file.    After visit summary was printed and given to patient upon discharge today.  Patient goals and care plan are included in After Visit Summary.

## 2024-10-18 ENCOUNTER — TELEPHONE (OUTPATIENT)
Dept: INTERNAL MEDICINE | Facility: CLINIC | Age: 74
End: 2024-10-18
Payer: MEDICARE

## 2024-10-18 RX ORDER — LORAZEPAM 1 MG/1
1 TABLET ORAL 2 TIMES DAILY
Qty: 60 TABLET | Refills: 2 | Status: SHIPPED | OUTPATIENT
Start: 2024-10-18

## 2024-10-18 RX ORDER — ESZOPICLONE 3 MG/1
3 TABLET, FILM COATED ORAL NIGHTLY
Qty: 30 TABLET | Refills: 2 | Status: SHIPPED | OUTPATIENT
Start: 2024-10-18

## 2024-10-18 NOTE — TELEPHONE ENCOUNTER
No care due was identified.  [facility] Embedded Care Due Messages. Reference number: [id number].   10/18/2024 11:09:48 AM CDT

## 2024-10-18 NOTE — TELEPHONE ENCOUNTER
[facility]  is calling for Skill Nursing service,  nurse with OT& PT. Need a face to face visit note, demo sheet and order for [facility]. Please fax [phone] phone [phone]. Can you please placed order for skill nursing with OT&PT?

## 2024-10-18 NOTE — TELEPHONE ENCOUNTER
----- Message from Lizeth sent at 10/18/2024  2:13 PM CDT -----  Janna calling from [facility]  patient is calling to request  Skill Nursing service,  nurse with OT& PT. Need a face to face visit note, demo sheet and order for [facility]. Please fax [phone] phone [phone]

## 2024-10-22 ENCOUNTER — TELEPHONE (OUTPATIENT)
Dept: INTERNAL MEDICINE | Facility: CLINIC | Age: 74
End: 2024-10-22
Payer: MEDICARE

## 2024-10-22 NOTE — TELEPHONE ENCOUNTER
----- Message from Olimpia sent at 10/22/2024  8:51 AM CDT -----  Contact: [phone]  Janna with [facility] is calling she states the pt needs an order for home health   Skilled nursing   Pt   And Ot    Through [facility] and she states they never heard back from you all     Fax [phone]

## 2024-10-23 ENCOUNTER — OFFICE VISIT (OUTPATIENT)
Dept: SPORTS MEDICINE | Facility: CLINIC | Age: 74
End: 2024-10-23
Payer: MEDICARE

## 2024-10-23 ENCOUNTER — HOSPITAL ENCOUNTER (OUTPATIENT)
Dept: RADIOLOGY | Facility: HOSPITAL | Age: 74
Discharge: HOME | End: 2024-10-23
Attending: ORTHOPAEDIC SURGERY
Payer: MEDICARE

## 2024-10-23 ENCOUNTER — OFFICE VISIT (OUTPATIENT)
Dept: ORTHOPEDICS | Facility: CLINIC | Age: 74
End: 2024-10-23
Payer: MEDICARE

## 2024-10-23 VITALS
BODY MASS INDEX: 26.21 KG/M2 | HEIGHT: 63 IN | BODY MASS INDEX: 26.22 KG/M2 | WEIGHT: 148 LBS | HEIGHT: 63 IN | WEIGHT: 147.94 LBS

## 2024-10-23 DIAGNOSIS — M25.522 LEFT ELBOW PAIN: ICD-10-CM

## 2024-10-23 DIAGNOSIS — M25.522 LEFT ELBOW PAIN: Primary | ICD-10-CM

## 2024-10-23 DIAGNOSIS — M67.814 TENDINOSIS OF LEFT ROTATOR CUFF: Primary | ICD-10-CM

## 2024-10-23 DIAGNOSIS — M18.0 ARTHRITIS OF CARPOMETACARPAL (CMC) JOINT OF BOTH THUMBS: Primary | ICD-10-CM

## 2024-10-23 DIAGNOSIS — M67.813 TENDINOSIS OF RIGHT ROTATOR CUFF: ICD-10-CM

## 2024-10-23 DIAGNOSIS — M17.0 PRIMARY OSTEOARTHRITIS OF BOTH KNEES: ICD-10-CM

## 2024-10-23 PROCEDURE — 73080 X-RAY EXAM OF ELBOW: CPT | Mod: 26,LT,, | Performed by: RADIOLOGY

## 2024-10-23 PROCEDURE — 20600 DRAIN/INJ JOINT/BURSA W/O US: CPT | Mod: 50,S$GLB,, | Performed by: ORTHOPAEDIC SURGERY

## 2024-10-23 PROCEDURE — 1159F MED LIST DOCD IN RCRD: CPT | Mod: CPTII,S$GLB,, | Performed by: ORTHOPAEDIC SURGERY

## 2024-10-23 PROCEDURE — 99213 OFFICE O/P EST LOW 20 MIN: CPT | Mod: 25,S$GLB,, | Performed by: ORTHOPAEDIC SURGERY

## 2024-10-23 PROCEDURE — 3044F HG A1C LEVEL LT 7.0%: CPT | Mod: CPTII,S$GLB,, | Performed by: ORTHOPAEDIC SURGERY

## 2024-10-23 PROCEDURE — 1100F PTFALLS ASSESS-DOCD GE2>/YR: CPT | Mod: CPTII,S$GLB,, | Performed by: STUDENT IN AN ORGANIZED HEALTH CARE EDUCATION/TRAINING PROGRAM

## 2024-10-23 PROCEDURE — 73080 X-RAY EXAM OF ELBOW: CPT | Mod: TC,LT

## 2024-10-23 PROCEDURE — 1125F AMNT PAIN NOTED PAIN PRSNT: CPT | Mod: CPTII,S$GLB,, | Performed by: STUDENT IN AN ORGANIZED HEALTH CARE EDUCATION/TRAINING PROGRAM

## 2024-10-23 PROCEDURE — 3060F POS MICROALBUMINURIA REV: CPT | Mod: CPTII,S$GLB,, | Performed by: ORTHOPAEDIC SURGERY

## 2024-10-23 PROCEDURE — 3060F POS MICROALBUMINURIA REV: CPT | Mod: CPTII,S$GLB,, | Performed by: STUDENT IN AN ORGANIZED HEALTH CARE EDUCATION/TRAINING PROGRAM

## 2024-10-23 PROCEDURE — 99999 PR PBB SHADOW E&M-EST. PATIENT-LVL IV: CPT | Mod: PBBFAC,,, | Performed by: STUDENT IN AN ORGANIZED HEALTH CARE EDUCATION/TRAINING PROGRAM

## 2024-10-23 PROCEDURE — 4010F ACE/ARB THERAPY RXD/TAKEN: CPT | Mod: CPTII,S$GLB,, | Performed by: STUDENT IN AN ORGANIZED HEALTH CARE EDUCATION/TRAINING PROGRAM

## 2024-10-23 PROCEDURE — 3288F FALL RISK ASSESSMENT DOCD: CPT | Mod: CPTII,S$GLB,, | Performed by: STUDENT IN AN ORGANIZED HEALTH CARE EDUCATION/TRAINING PROGRAM

## 2024-10-23 PROCEDURE — 3066F NEPHROPATHY DOC TX: CPT | Mod: CPTII,S$GLB,, | Performed by: STUDENT IN AN ORGANIZED HEALTH CARE EDUCATION/TRAINING PROGRAM

## 2024-10-23 PROCEDURE — 1160F RVW MEDS BY RX/DR IN RCRD: CPT | Mod: CPTII,S$GLB,, | Performed by: ORTHOPAEDIC SURGERY

## 2024-10-23 PROCEDURE — 3044F HG A1C LEVEL LT 7.0%: CPT | Mod: CPTII,S$GLB,, | Performed by: STUDENT IN AN ORGANIZED HEALTH CARE EDUCATION/TRAINING PROGRAM

## 2024-10-23 PROCEDURE — 1101F PT FALLS ASSESS-DOCD LE1/YR: CPT | Mod: CPTII,S$GLB,, | Performed by: ORTHOPAEDIC SURGERY

## 2024-10-23 PROCEDURE — 99999 PR PBB SHADOW E&M-EST. PATIENT-LVL III: CPT | Mod: PBBFAC,,, | Performed by: ORTHOPAEDIC SURGERY

## 2024-10-23 PROCEDURE — 3066F NEPHROPATHY DOC TX: CPT | Mod: CPTII,S$GLB,, | Performed by: ORTHOPAEDIC SURGERY

## 2024-10-23 PROCEDURE — 3288F FALL RISK ASSESSMENT DOCD: CPT | Mod: CPTII,S$GLB,, | Performed by: ORTHOPAEDIC SURGERY

## 2024-10-23 PROCEDURE — 4010F ACE/ARB THERAPY RXD/TAKEN: CPT | Mod: CPTII,S$GLB,, | Performed by: ORTHOPAEDIC SURGERY

## 2024-10-23 PROCEDURE — 20611 DRAIN/INJ JOINT/BURSA W/US: CPT | Mod: 50,S$GLB,, | Performed by: STUDENT IN AN ORGANIZED HEALTH CARE EDUCATION/TRAINING PROGRAM

## 2024-10-23 PROCEDURE — 1125F AMNT PAIN NOTED PAIN PRSNT: CPT | Mod: CPTII,S$GLB,, | Performed by: ORTHOPAEDIC SURGERY

## 2024-10-23 PROCEDURE — 99214 OFFICE O/P EST MOD 30 MIN: CPT | Mod: 25,S$GLB,, | Performed by: STUDENT IN AN ORGANIZED HEALTH CARE EDUCATION/TRAINING PROGRAM

## 2024-10-23 RX ORDER — TRIAMCINOLONE ACETONIDE 40 MG/ML
40 INJECTION, SUSPENSION INTRA-ARTICULAR; INTRAMUSCULAR
Status: DISCONTINUED | OUTPATIENT
Start: 2024-10-23 | End: 2024-10-23 | Stop reason: HOSPADM

## 2024-10-23 RX ADMIN — TRIAMCINOLONE ACETONIDE 40 MG: 40 INJECTION, SUSPENSION INTRA-ARTICULAR; INTRAMUSCULAR at 02:10

## 2024-10-23 RX ADMIN — TRIAMCINOLONE ACETONIDE 40 MG: 40 INJECTION, SUSPENSION INTRA-ARTICULAR; INTRAMUSCULAR at 03:10

## 2024-10-23 NOTE — PROGRESS NOTES
Subjective:     Patient ID: Cassandra Campos is a 74 y.o. female.    Chief Complaint: Pain of the Left Hand and Pain of the Right Hand      HPI:  The patient is a 74-year-old female with bilateral thumb basal joint arthritis..  She has not tried splinting or injection.    Past Medical History:   Diagnosis Date    Arthritis     hands    Bilateral bunions     Borderline glaucoma     De Quervain's disease (radial styloid tenosynovitis)     Gastritis     upper GI 2017    Hydradenitis     Hyperlipidemia     Hypertension     Insomnia     Migraines 2000    Nasal septum perforation     Obesity     Pneumonia     Restrictive airway disease     Sleep apnea     SVT (supraventricular tachycardia) 2013    Trigger finger     Type 2 diabetes mellitus      am 2024     Past Surgical History:   Procedure Laterality Date    AXILLARY HIDRADENITIS EXCISION Bilateral     BONE EXOSTOSIS EXCISION Right 2018    Procedure: EXCISION, EXOSTOSIS;  Surgeon: Jayro Pedraza Sr., MD;  Location: [facility];  Service: Orthopedics;  Laterality: Right;    BREAST BIOPSY Bilateral     both benign    BREAST SURGERY  1998    CARPAL TUNNEL RELEASE      bilateral    CARPAL TUNNEL RELEASE Right 2024    Procedure: RELEASE, CARPAL TUNNEL;  Surgeon: Jaime Oswald MD;  Location: [facility] OR;  Service: Orthopedics;  Laterality: Right;    CARPAL TUNNEL RELEASE Left 2024    Procedure: RELEASE, CARPAL TUNNEL;  Surgeon: Jaime Oswald MD;  Location: [facility];  Service: Orthopedics;  Laterality: Left;    CATARACT EXTRACTION Bilateral     OU     SECTION  1979    CHOLECYSTECTOMY  2014    COLONOSCOPY N/A 10/02/2020    Procedure: COLONOSCOPY;  Surgeon: Tushar Edwards MD;  Location: [facility];  Service: Endoscopy;  Laterality: N/A;    COLONOSCOPY W/ POLYPECTOMY  10/02/2020    Polyps x3, repeat 5 years; Tushar Edwards MD     CYST REMOVAL  2015    sebaceous cyst removed from face    DE QUERVAIN'S  RELEASE Left 01/16/2020    Procedure: RELEASE, HAND, FOR DEQUERVAIN'S TENOSYNOVITIS;  Surgeon: Jaime Oswald MD;  Location: [facility] OR;  Service: Orthopedics;  Laterality: Left;    DE QUERVAIN'S RELEASE Right 11/20/2020    Procedure: RELEASE, HAND, FOR DEQUERVAIN'S TENOSYNOVITIS;  Surgeon: Jaime Oswald MD;  Location: [facility] OR;  Service: Orthopedics;  Laterality: Right;    ENDOBRONCHIAL ULTRASOUND Bilateral 04/10/2024    Procedure: ENDOBRONCHIAL ULTRASOUND (EBUS);  Surgeon: Adrian Robin MD;  Location: [facility] ENDO;  Service: Pulmonary;  Laterality: Bilateral;    EYE SURGERY      gastric sleeve  02/13/2017    Dr. Watson    INJECTION OF ANESTHETIC AGENT AROUND MULTIPLE INTERCOSTAL NERVES  5/10/2024    Procedure: BLOCK, NERVE, INTERCOSTAL, 2 OR MORE;  Surgeon: Mike Nuñez MD;  Location: [facility] OR;  Service: Cardiothoracic;;    KNEE SURGERY Right     OLECRANON BURSECTOMY Right 07/25/2018    Procedure: BURSECTOMY, OLECRANON;  Surgeon: Jayro Pedraza Sr., MD;  Location: [facility];  Service: Orthopedics;  Laterality: Right;    SURGICAL REMOVAL OF BUNION WITH OSTEOTOMY OF METATARSAL BONE Left 05/10/2019    Procedure: BUNIONECTOMY, WITH METATARSAL OSTEOTOMY;  Surgeon: Srinivasan Villanueva DPM;  Location: [facility] OR;  Service: Podiatry;  Laterality: Left;    SURGICAL REMOVAL OF BUNION WITH OSTEOTOMY OF METATARSAL BONE Right 06/28/2019    Procedure: BUNIONECTOMY, WITH METATARSAL OSTEOTOMY;  Surgeon: Srinivasan Villanueva DPM;  Location: [facility] OR;  Service: Podiatry;  Laterality: Right;    SURGICAL REMOVAL OF LYMPH NODE Left 5/10/2024    Procedure: EXCISION, LYMPH NODE;  Surgeon: Mike Nuñez MD;  Location: [facility] OR;  Service: Cardiothoracic;  Laterality: Left;    TONSILLECTOMY, ADENOIDECTOMY  1980s    TRANSESOPHAGEAL ECHOCARDIOGRAM WITH POSSIBLE CARDIOVERSION (LAKESHIA W/ POSS CARDIOVERSION) N/A 5/15/2024    Procedure: Transesophageal echo (LAKESHIA) intra-procedure log documentation;  Surgeon: Twan Pelaez MD;  Location: [facility]  CATH LAB;  Service: Cardiology;  Laterality: N/A;    TRIGGER FINGER RELEASE Right 2015    Dr. Milo HALL ROBOTIC RATS,WITH LOBECTOMY,LUNG Left 5/10/2024    Procedure: XI ROBOTIC RATS,WITH LOBECTOMY,LUNG;  Surgeon: Mike Nuñez MD;  Location: [facility];  Service: Cardiothoracic;  Laterality: Left;  Lingulectomy     Family History   Problem Relation Name Age of Onset    Prostate cancer Brother      Diabetes Maternal Aunt Alondra Campos     Diabetes Cousin      Hypertension Maternal Grandmother Portia Orosco      Social History     Socioeconomic History    Marital status:     Number of children: 1   Occupational History    Occupation:  aid   Tobacco Use    Smoking status: Former     Current packs/day: 0.00     Average packs/day: 0.5 packs/day for 42.0 years (21.0 ttl pk-yrs)     Types: Cigarettes     Start date: 1970     Quit date: 2012     Years since quittin.8     Passive exposure: Past    Smokeless tobacco: Never   Substance and Sexual Activity    Alcohol use: Not Currently     Alcohol/week: 1.0 standard drink of alcohol     Types: 1 Glasses of wine per week     Comment: Glass red wine once a every 2 weeks    Drug use: Never    Sexual activity: Not Currently     Partners: Female     Birth control/protection: Abstinence, None   Social History Narrative    Single, part-time teacher. Masters degree biology.      Social Drivers of Health     Financial Resource Strain: Low Risk  (2023)    Overall Financial Resource Strain (CARDIA)     Difficulty of Paying Living Expenses: Not hard at all   Food Insecurity: Food Insecurity Present (2023)    Hunger Vital Sign     Worried About Running Out of Food in the Last Year: Never true     Ran Out of Food in the Last Year: Sometimes true   Transportation Needs: No Transportation Needs (2023)    PRAPARE - Transportation     Lack of Transportation (Medical): No     Lack of Transportation (Non-Medical): No   Physical  Activity: Insufficiently Active (12/19/2023)    Exercise Vital Sign     Days of Exercise per Week: 3 days     Minutes of Exercise per Session: 20 min   Stress: No Stress Concern Present (12/19/2023)    [employer] [place] [employer] - Occupational Stress Questionnaire     Feeling of Stress : Not at all   Housing Stability: Low Risk  (12/19/2023)    Housing Stability Vital Sign     Unable to Pay for Housing in the Last Year: No     Number of Places Lived in the Last Year: 1     Unstable Housing in the Last Year: No     Medication List with Changes/Refills   Current Medications    ALBUTEROL (PROVENTIL/VENTOLIN HFA) 90 MCG/ACTUATION INHALER    INHALE 2 PUFFS INTO THE LUNGS EVERY 4 HOURS AS NEEDED FOR WHEEZING OR SHORTNESS OF BREATH.    AMITRIPTYLINE (ELAVIL) 100 MG TABLET    Take 1 tablet (100 mg total) by mouth every evening.    BLOOD-GLUCOSE METER (ACCU-CHEK GUIDE ME GLUCOSE MTR) MISC    use to check blood glucose 2 times a day    BLOOD-GLUCOSE SENSOR (DEXCOM G7 SENSOR) LUISA    1 each by Misc.(Non-Drug; Combo Route) route continuous.    DICLOFENAC SODIUM (VOLTAREN ARTHRITIS PAIN) 1 % GEL    Apply 2 g topically once daily.    ESZOPICLONE (LUNESTA) 3 MG TAB    Take 1 tablet (3 mg total) by mouth every evening.    GUAIFENESIN-CODEINE 100-10 MG/5 ML (TUSSI-ORGANIDIN NR)  MG/5 ML SYRUP    Take 5 mLs by mouth 3 (three) times daily.    HYDROCODONE-ACETAMINOPHEN (NORCO) 5-325 MG PER TABLET    Take 1 tablet by mouth every 6 (six) hours as needed for Pain.    LIDOCAINE (LIDODERM) 5 %    Place 1 patch onto the skin once daily. Remove & Discard patch within 12 hours or as directed by MD    LORAZEPAM (ATIVAN) 1 MG TABLET    Take 1 tablet (1 mg total) by mouth 2 (two) times daily.    LOSARTAN (COZAAR) 25 MG TABLET    TAKE 1 TABLET BY MOUTH ONCE  DAILY    MELOXICAM (MOBIC) 15 MG TABLET    Take 1 tablet by mouth daily for 14 days, then take 1 tablet by mouth daily as needed.    MELOXICAM (MOBIC) 15 MG TABLET    Take  1 tablet by mouth daily for 14 days, then daily as needed.    METOPROLOL SUCCINATE (TOPROL-XL) 50 MG 24 HR TABLET    TAKE 1 TABLET BY MOUTH ONCE  DAILY    OMEPRAZOLE (PRILOSEC) 20 MG CAPSULE    TAKE 1 CAPSULE BY MOUTH ONCE  DAILY    SEMAGLUTIDE (OZEMPIC) 2 MG/DOSE (8 MG/3 ML) PNIJ    Inject 2 mg into the skin every 7 days.    TRIAMCINOLONE ACETONIDE 0.025 % LOTN    Apply small amount to affective areas twice a day  take cool showers or baths     Review of patient's allergies indicates:  No Known Allergies  Review of Systems   Constitutional: Negative for malaise/fatigue.   HENT:  Negative for hearing loss.    Eyes:  Negative for double vision and visual disturbance.   Cardiovascular:  Positive for irregular heartbeat. Negative for chest pain.   Respiratory:  Negative for shortness of breath.    Endocrine: Negative for cold intolerance.   Hematologic/Lymphatic: Does not bruise/bleed easily.   Skin:  Negative for poor wound healing and suspicious lesions.   Musculoskeletal:  Positive for arthritis, falls, joint pain and joint swelling. Negative for gout.   Gastrointestinal:  Positive for heartburn. Negative for nausea and vomiting.   Genitourinary:  Negative for dysuria.   Neurological:  Positive for numbness, paresthesias, sensory change and weakness.   Psychiatric/Behavioral:  Positive for altered mental status and depression. Negative for memory loss and substance abuse. The patient has insomnia and is nervous/anxious.    Allergic/Immunologic: Negative for persistent infections.       Objective:   Body mass index is 26.2 kg/m².  There were no vitals filed for this visit.             General    Constitutional: She is oriented to person, place, and time. She appears well-developed and well-nourished. No distress.   HENT:   Head: Normocephalic.   Eyes: EOM are normal.   Pulmonary/Chest: Effort normal.   Neurological: She is oriented to person, place, and time.   Psychiatric: She has a normal mood and affect.              Right Hand/Wrist Exam     Inspection   Scars: Wrist - present Hand -  present  Effusion: Wrist - absent Hand -  absent    Pain   Wrist - The patient exhibits pain of the CMC.    Other     Neuorologic Exam    Median Distribution: normal  Ulnar Distribution: normal  Radial Distribution: normal    Comments:  The patient has tenderness well localized right thumb basal joint with a positive axial circumduction grind test      Left Hand/Wrist Exam     Inspection   Scars: Wrist - present Hand -  present  Effusion: Wrist - absent Hand -  absent    Pain   Wrist - The patient exhibits pain of the CMC.    Other     Sensory Exam  Median Distribution: normal  Ulnar Distribution: normal  Radial Distribution: normal    Comments:  The patient has tenderness well localized left thumb basal joint with a positive axial circumduction grind test          Vascular Exam       Capillary Refill  Right Hand: normal capillary refill  Left Hand: normal capillary refill          Relevant imaging results reviewed and interpreted by me, discussed with the patient and / or family today radiographs both hands showed basal joint arthritis  Assessment:     Encounter Diagnosis   Name Primary?    Arthritis of carpometacarpal (CMC) joint of both thumbs Yes        Plan:       The patient injected both thumb basal joints each with 0.5 cc of Kenalog and 0.5 cc of 2% plain lidocaine under sterile technique.  She will wait at least 3 months between injections.  She was given bilateral thumb spica splints              Disclaimer: This note was prepared using a voice recognition system and is likely to have sound alike errors within the text.

## 2024-10-23 NOTE — PROGRESS NOTES
Patient ID: Cassandra Campos  YOB: 1950  MRN: [id number]    Chief Complaint: Pain of the Right Knee, Pain of the Left Knee, Pain of the Right Shoulder, and Pain of the Left Shoulder      History of Present Illness: Cassandra Campos is a right-hand dominant 74 y.o. female who presents today with bilateral shoulder and bilateral knee pain.  Last office visit was on 8/21/2024 where she received bilateral Synvisc One injections to knees.  She can't recall how helpful injections were as she was dealing with cancer issues and having the cancer removed.  Believes that her knee pain started again 2 months ago.  Was seen in clinic on 6/26/2024 and received a left knee and left shoulder cortisone injection.  Was seen on 6/12/2024 for right knee cortisone and right shoulder cortisone injections.  Reports today that she is having both shoulder and knee pain.  Rates her pain today at a 9/10 and interested in repeating CSI.      7/18/2024 Interval History of Present Illness: Cassandra Campos is a right-hand dominant 73 y.o. female who presents today with bilateral knee pain, right greater than left.  Reports that she bumped her right knee recently and has increased her pain. Last seen in clinic on 6/26/2024 and received CSI to the    Left knee.  Received cSI to the right knee on 6/12/2024.  Currently rates pain at a 10/10 with constant throbbing pain that keeps her up at night.  Patient reports pain when walking and sitting.  Completed PT last week and currently taking Mobic and Tylenol.     6/26/2024 Interval History of Present Illness: Cassandra Campos is a right-hand dominant 73 y.o. female who presents today with left knee and left shoulder pain.   Patient notes pain is at worst a 10/10 at times affecting her activity of daily living and thus her quality of life.  She has not using any devices to assist with ambulation nor is she wearing any knee braces.  She states she takes Tylenol as needed to  help with the pain .  She states she has having a hard time walking long distances, going from a sitting to standing or standing to seated position, unlevel terrain or stairs.  She also complains of left shoulder pain and dysfunction that had started 3-4 weeks . Her symptoms included pain and lack of range of motion. Her pain was aggravated by overhead movement, reaching movements. She had tried rest, activity modification, Muscle relaxer, ibuprofen, heat and ice.     6/12/2024 Interval History of Present Illness: Cassandra Campos is a right-hand dominant 73 y.o. female who presents today with bilateral shoulder pain and bilateral knee pain.  73-year-old female with history of HTN, HLD, and DM  with c/o left shoulder pain.  States pain to left shoulder with touch and movement. States difficulty lifting shoulder above head and mostly keeping her up at night.. States taking OTC NSAIDs with minimal relief. She was seen by  POLO Alan and received left knee CSI on 3/22/24. Received SA steroid injection by Dr. Palma on 2/07/2024.  Pt states she was concerned she is still having pain. MRI left shoulder on file. Patient is currently doing Home health physical therapy.       The patient is active in none.  Occupation:       Past Medical History:   Past Medical History:   Diagnosis Date    Arthritis     hands    Bilateral bunions     Borderline glaucoma     De Quervain's disease (radial styloid tenosynovitis)     Gastritis     upper GI 2/2017    Hydradenitis     Hyperlipidemia     Hypertension     Insomnia     Migraines 02/01/2000    Nasal septum perforation     Obesity     Pneumonia     Restrictive airway disease     Sleep apnea     SVT (supraventricular tachycardia) 09/2013    Trigger finger     Type 2 diabetes mellitus 2012     am 02/01/2024     Past Surgical History:   Procedure Laterality Date    AXILLARY HIDRADENITIS EXCISION Bilateral     BONE EXOSTOSIS EXCISION Right 07/25/2018    Procedure: EXCISION,  EXOSTOSIS;  Surgeon: Jayro Pedraza Sr., MD;  Location: [facility];  Service: Orthopedics;  Laterality: Right;    BREAST BIOPSY Bilateral     both benign    BREAST SURGERY  1998    CARPAL TUNNEL RELEASE      bilateral    CARPAL TUNNEL RELEASE Right 2024    Procedure: RELEASE, CARPAL TUNNEL;  Surgeon: Jaime Oswald MD;  Location: [facility];  Service: Orthopedics;  Laterality: Right;    CARPAL TUNNEL RELEASE Left 2024    Procedure: RELEASE, CARPAL TUNNEL;  Surgeon: Jaime Oswald MD;  Location: [facility];  Service: Orthopedics;  Laterality: Left;    CATARACT EXTRACTION Bilateral     OU     SECTION  1979    CHOLECYSTECTOMY  2014    COLONOSCOPY N/A 10/02/2020    Procedure: COLONOSCOPY;  Surgeon: Tushar Edwards MD;  Location: [facility];  Service: Endoscopy;  Laterality: N/A;    COLONOSCOPY W/ POLYPECTOMY  10/02/2020    Polyps x3, repeat 5 years; Tushar Edwrads MD     CYST REMOVAL  2015    sebaceous cyst removed from face    DE QUERVAIN'S RELEASE Left 2020    Procedure: RELEASE, HAND, FOR DEQUERVAIN'S TENOSYNOVITIS;  Surgeon: Jaime Oswald MD;  Location: [facility];  Service: Orthopedics;  Laterality: Left;    DE QUERVAIN'S RELEASE Right 2020    Procedure: RELEASE, HAND, FOR DEQUERVAIN'S TENOSYNOVITIS;  Surgeon: Jaime Oswald MD;  Location: [facility];  Service: Orthopedics;  Laterality: Right;    ENDOBRONCHIAL ULTRASOUND Bilateral 04/10/2024    Procedure: ENDOBRONCHIAL ULTRASOUND (EBUS);  Surgeon: Adrian Robin MD;  Location: [facility];  Service: Pulmonary;  Laterality: Bilateral;    EYE SURGERY      gastric sleeve  2017    Dr. Watson    INJECTION OF ANESTHETIC AGENT AROUND MULTIPLE INTERCOSTAL NERVES  5/10/2024    Procedure: BLOCK, NERVE, INTERCOSTAL, 2 OR MORE;  Surgeon: Mike Nuñez MD;  Location: [facility];  Service: Cardiothoracic;;    KNEE SURGERY Right     OLECRANON BURSECTOMY Right 2018    Procedure: BURSECTOMY, OLECRANON;   Surgeon: Jayro Pedraza Sr., MD;  Location: [facility];  Service: Orthopedics;  Laterality: Right;    SURGICAL REMOVAL OF BUNION WITH OSTEOTOMY OF METATARSAL BONE Left 05/10/2019    Procedure: BUNIONECTOMY, WITH METATARSAL OSTEOTOMY;  Surgeon: Srinivasan Villanueva DPM;  Location: [facility] OR;  Service: Podiatry;  Laterality: Left;    SURGICAL REMOVAL OF BUNION WITH OSTEOTOMY OF METATARSAL BONE Right 2019    Procedure: BUNIONECTOMY, WITH METATARSAL OSTEOTOMY;  Surgeon: Srinivasan Villanueva DPM;  Location: [facility] OR;  Service: Podiatry;  Laterality: Right;    SURGICAL REMOVAL OF LYMPH NODE Left 5/10/2024    Procedure: EXCISION, LYMPH NODE;  Surgeon: Mike Nuñez MD;  Location: [facility] OR;  Service: Cardiothoracic;  Laterality: Left;    TONSILLECTOMY, ADENOIDECTOMY  1980s    TRANSESOPHAGEAL ECHOCARDIOGRAM WITH POSSIBLE CARDIOVERSION (LAKESHIA W/ POSS CARDIOVERSION) N/A 5/15/2024    Procedure: Transesophageal echo (LAKESHIA) intra-procedure log documentation;  Surgeon: Twan Pelaez MD;  Location: [facility] CATH LAB;  Service: Cardiology;  Laterality: N/A;    TRIGGER FINGER RELEASE Right 2015    Dr. Milo HALL ROBOTIC RATS,WITH LOBECTOMY,LUNG Left 5/10/2024    Procedure: XI ROBOTIC RATS,WITH LOBECTOMY,LUNG;  Surgeon: Mike Nuñez MD;  Location: [facility] OR;  Service: Cardiothoracic;  Laterality: Left;  Lingulectomy     Family History   Problem Relation Name Age of Onset    Prostate cancer Brother      Diabetes Maternal Aunt Alondra Campos     Diabetes Cousin      Hypertension Maternal Grandmother Portia Orosco      Social History     Socioeconomic History    Marital status:     Number of children: 1   Occupational History    Occupation:  aid   Tobacco Use    Smoking status: Former     Current packs/day: 0.00     Average packs/day: 0.5 packs/day for 42.0 years (21.0 ttl pk-yrs)     Types: Cigarettes     Start date: 1970     Quit date: 2012     Years since quittin.8     Passive exposure: Past     Smokeless tobacco: Never   Substance and Sexual Activity    Alcohol use: Not Currently     Alcohol/week: 1.0 standard drink of alcohol     Types: 1 Glasses of wine per week     Comment: Glass red wine once a every 2 weeks    Drug use: Never    Sexual activity: Not Currently     Partners: Female     Birth control/protection: Abstinence, None   Social History Narrative    Single, part-time teacher. Masters degree biology.      Social Drivers of Health     Financial Resource Strain: Low Risk  (12/19/2023)    Overall Financial Resource Strain (CARDIA)     Difficulty of Paying Living Expenses: Not hard at all   Food Insecurity: Food Insecurity Present (12/19/2023)    Hunger Vital Sign     Worried About Running Out of Food in the Last Year: Never true     Ran Out of Food in the Last Year: Sometimes true   Transportation Needs: No Transportation Needs (12/19/2023)    PRAPARE - Transportation     Lack of Transportation (Medical): No     Lack of Transportation (Non-Medical): No   Physical Activity: Insufficiently Active (12/19/2023)    Exercise Vital Sign     Days of Exercise per Week: 3 days     Minutes of Exercise per Session: 20 min   Stress: No Stress Concern Present (12/19/2023)    [place] [employer] - Occupational Stress Questionnaire     Feeling of Stress : Not at all   Housing Stability: Low Risk  (12/19/2023)    Housing Stability Vital Sign     Unable to Pay for Housing in the Last Year: No     Number of Places Lived in the Last Year: 1     Unstable Housing in the Last Year: No     Medication List with Changes/Refills   Current Medications    ALBUTEROL (PROVENTIL/VENTOLIN HFA) 90 MCG/ACTUATION INHALER    INHALE 2 PUFFS INTO THE LUNGS EVERY 4 HOURS AS NEEDED FOR WHEEZING OR SHORTNESS OF BREATH.    AMITRIPTYLINE (ELAVIL) 100 MG TABLET    Take 1 tablet (100 mg total) by mouth every evening.    BLOOD-GLUCOSE METER (ACCU-CHEK GUIDE ME GLUCOSE MTR) MISC    use to check blood glucose 2 times a day     BLOOD-GLUCOSE SENSOR (DEXCOM G7 SENSOR) LUISA    1 each by Misc.(Non-Drug; Combo Route) route continuous.    DICLOFENAC SODIUM (VOLTAREN ARTHRITIS PAIN) 1 % GEL    Apply 2 g topically once daily.    ESZOPICLONE (LUNESTA) 3 MG TAB    Take 1 tablet (3 mg total) by mouth every evening.    GUAIFENESIN-CODEINE 100-10 MG/5 ML (TUSSI-ORGANIDIN NR)  MG/5 ML SYRUP    Take 5 mLs by mouth 3 (three) times daily.    HYDROCODONE-ACETAMINOPHEN (NORCO) 5-325 MG PER TABLET    Take 1 tablet by mouth every 6 (six) hours as needed for Pain.    LIDOCAINE (LIDODERM) 5 %    Place 1 patch onto the skin once daily. Remove & Discard patch within 12 hours or as directed by MD    LORAZEPAM (ATIVAN) 1 MG TABLET    Take 1 tablet (1 mg total) by mouth 2 (two) times daily.    LOSARTAN (COZAAR) 25 MG TABLET    TAKE 1 TABLET BY MOUTH ONCE  DAILY    MELOXICAM (MOBIC) 15 MG TABLET    Take 1 tablet by mouth daily for 14 days, then take 1 tablet by mouth daily as needed.    MELOXICAM (MOBIC) 15 MG TABLET    Take 1 tablet by mouth daily for 14 days, then daily as needed.    METOPROLOL SUCCINATE (TOPROL-XL) 50 MG 24 HR TABLET    TAKE 1 TABLET BY MOUTH ONCE  DAILY    OMEPRAZOLE (PRILOSEC) 20 MG CAPSULE    TAKE 1 CAPSULE BY MOUTH ONCE  DAILY    SEMAGLUTIDE (OZEMPIC) 2 MG/DOSE (8 MG/3 ML) PNIJ    Inject 2 mg into the skin every 7 days.    TRIAMCINOLONE ACETONIDE 0.025 % LOTN    Apply small amount to affective areas twice a day  take cool showers or baths     Review of patient's allergies indicates:  No Known Allergies    Physical Exam:   Body mass index is 26.22 kg/m².    GENERAL: Well appearing, in no acute distress.  HEAD: Normocephalic and atraumatic.  ENT: External ears and nose grossly normal.  EYES: EOMI bilaterally  PULMONARY: Respirations are grossly even and non-labored.  NEURO: Awake, alert, and oriented x 3.  SKIN: No obvious rashes appreciated.  PSYCH: Mood & affect are appropriate.    Detailed MSK exam:     Left shoulder exam:   -ROM:  abduction 100, forward flexion 100, external rotation 70, internal rotation 60  -empty can test pain but no weakness, resisted ER negative, belly press pain but no weakness  -landaverde test positive, neers test positive, whipple test positive  -biceps load test negative, yerguson test negative, Flagler's test negative  -sensation intact, pulses 2+  -TTP: lateral cuff insertion    Right shoulder exam:   -ROM: abduction 100, forward flexion 100, external rotation 70, internal rotation 60  -empty can test pain but no weakness, resisted ER negative, belly press pain but no weakness  -landaverde test positive, neers test positive, whipple test positive  -biceps load test negative, yerguson test negative, Flagler's test negative  -sensation intact, pulses 2+  -TTP: lateral cuff insertion    Imaging:  Cardiac Monitor - 3-15 Day Adult (Cupid Only)    The predominant rhythm is sinus.    The patient was monitored for a total of 6d 23h, underlying rhythm is   Sinus.  The minimum heart rate was 83 bpm; the maximum 141 bpm; the average 102   bpm.  0 % of Atrial fibrillation/Atrial flutter with longest episode of 0 ms.  The total burden of AV Block present was 0 % [Complete Heart Block: 0 %;   Advanced (High Grade):  0 %; 2nd Degree, Mobitz II: 0 %; 2nd Degree, Mobitz I: 0 %].  There were 0 pauses, the longest pause was 0 ms at --.  Total count of Ventricular Tachycardia (VT): 1 episode(s). Longest VT: 12   beats on Day 5 / 07:34:46  pm. Fastest VT: 186 bpm on Day 5 / 07:34:46 pm.  4 supraventricular episodes were found. Longest SVT Episode 6 beats,   Fastest  bpm  There were a total of 420 PVCs with 3 morphologies and 4 couplets. Overall   PVC [place] at 0.04 %  There were a total of 0 Other Beats. There were 0 total number of paced   beats.  There were a total of 1012 PSVCs with 1 morphologies and 6 couplets.   Overall PSVC [place] at  0.1 %  There is a total of 0 patient events        Relevant imaging results were reviewed and  interpreted by me and per my read shows mild AC arthritic changes.  This was discussed with the patient and / or family today.     Assessment:  Cassandra Campos is a 74 y.o. female following up for bilateral shoulder pain. Interested in repeat steroid injections today.   Plan: Steroid injection given today (see separate procedure note for details). We discussed the proper protocols after the injection such as no submerging pools, baths tubs, or hot tubs for 24 hr.  Showering is okay today.  We also discussed that blood sugars can be elevated after an injection and asked patient to properly checked her sugars over the next few days and contact their PCP if there are any concerns.  We discussed red flags such as fevers, chills, red, warm, tender joint at the area of injection to please seek medical care immediately.   Pain referral. Continue conservative management for pain.   Follow up as needed. All questions answered.     Tendinosis of left rotator cuff  -     Sports Medicine US - Guidance for Needle Placement  -     Ambulatory referral/consult to Pain Clinic; Future; Expected date: 10/30/2024  -     Large Joint Aspiration/Injection: bilateral subacromial bursa    Tendinosis of right rotator cuff  -     Sports Medicine US - Guidance for Needle Placement  -     Ambulatory referral/consult to Pain Clinic; Future; Expected date: 10/30/2024  -     Large Joint Aspiration/Injection: bilateral subacromial bursa    Primary osteoarthritis of both knees  -     Ambulatory referral/consult to Pain Clinic; Future; Expected date: 10/30/2024         Ultrasound guidance was used for needle localization. Images were saved and stored for documentation. The appropriate structures were visualized. Dynamic visualization of the needle was continuous throughout the procedures and maintained good position.      Electronically signed:  Naman Barton MD, MPH  10/23/2024  2:52 PM

## 2024-10-23 NOTE — PROCEDURES
Sports Medicine US - Guidance for Needle Placement    Date/Time: 10/23/2024 2:40 PM    Performed by: Naman Barton MD  Authorized by: Naman Barton MD  Preparation: Patient was prepped and draped in the usual sterile fashion.  Local anesthesia used: no    Anesthesia:  Local anesthesia used: no    Sedation:  Patient sedated: no    Patient tolerance: patient tolerated the procedure well with no immediate complications  Comments: Ultrasound guidance was used for needle localization. Images were saved and stored for documentation. The appropriate structures were visualized. Dynamic visualization of the needle was continuous throughout the procedures and maintained good position.

## 2024-10-23 NOTE — PATIENT INSTRUCTIONS
Assessment:  Cassandra Campos is a 74 y.o. female   Chief Complaint   Patient presents with    Right Knee - Pain    Left Knee - Pain    Right Shoulder - Pain    Left Shoulder - Pain       Encounter Diagnoses   Name Primary?    Tendinosis of left rotator cuff Yes    Tendinosis of right rotator cuff         Plan:  Ultrasound guided subacromial cortisone injections to bilateral shoulders  We discussed the proper protocols after the injection such as no submerging pools, baths tubs, or hot tubs for 24 hr.  Showering is okay today.  We also discussed that blood sugars can be elevated after an injection and asked patient to properly checked her sugars over the next few days and contact their PCP if there are any concerns.  We discussed red flags such as fevers, chills, red, warm, tender joint at the area of injection to please seek medical care immediately.    Referral to pain management  Follow up for CSI to bilateral knees next week    Follow-up: 1 week or sooner if there are problems between now and then.    Thank you for choosing [facility] and Dr. Naman Barton for your orthopedic & sports medicine care. It is our goal to provide you with exceptional care that will help keep you healthy, active, and get you back in the game.    Please do not hesitate to reach out to us via email, phone, or MyChart with any questions, concerns, or feedback.    If you felt that you received exemplary care today, please consider leaving us feedback on [employer] at:  [email or web address]    If you are experiencing pain/discomfort ,or have questions after 5pm and would like to be connected to the [facility] on-call team, please call this number and specify which Sports Medicine provider is treating you: [phone]

## 2024-10-23 NOTE — PROCEDURES
Small Joint Aspiration/Injection: R thumb CMC, L thumb CMC    Date/Time: 10/23/2024 3:15 PM    Performed by: Jaime Oswald MD  Authorized by: Jaime Oswald MD    Consent Done?:  Yes (Verbal)  Indications:  Pain and arthritis  Site marked: the procedure site was marked    Timeout: prior to procedure the correct patient, procedure, and site was verified    Prep: patient was prepped and draped in usual sterile fashion      Local anesthesia used?: Yes    Local anesthetic:  Lidocaine 2% without epinephrine  Anesthetic total (ml):  1    Location:  Thumb  Site:  R thumb CMC and L thumb CMC  Ultrasonic guidance for needle placement?: No    Needle size:  25 G  Approach:  Dorsal  Medications:  40 mg triamcinolone acetonide 40 mg/mL; 40 mg triamcinolone acetonide 40 mg/mL

## 2024-10-23 NOTE — PROCEDURES
Large Joint Aspiration/Injection: bilateral subacromial bursa    Date/Time: 10/23/2024 2:40 PM    Performed by: Naman Barton MD  Authorized by: Naman Barton MD    Consent Done?:  Yes (Verbal)  Indications:  Pain  Site marked: the procedure site was marked    Timeout: prior to procedure the correct patient, procedure, and site was verified    Prep: patient was prepped and draped in usual sterile fashion    Local anesthetic:  Bupivacaine 0.5% without epinephrine and lidocaine 1% without epinephrine    Details:  Needle Size:  21 G  Ultrasonic Guidance for needle placement?: Yes    Images are saved and documented.  Approach:  Lateral  Location:  Shoulder  Laterality:  Bilateral  Site:  Bilateral subacromial bursa  Medications (Right):  40 mg triamcinolone acetonide 40 mg/mL  Medications (Left):  40 mg triamcinolone acetonide 40 mg/mL  Patient tolerance:  Patient tolerated the procedure well with no immediate complications     Ultrasound guidance was used for needle localization. Images were saved and stored for documentation. The appropriate structures were visualized. Dynamic visualization of the needle was continuous throughout the procedures and maintained good position.

## 2024-10-25 ENCOUNTER — OFFICE VISIT (OUTPATIENT)
Dept: INTERNAL MEDICINE | Facility: CLINIC | Age: 74
End: 2024-10-25
Payer: MEDICARE

## 2024-10-25 VITALS
DIASTOLIC BLOOD PRESSURE: 70 MMHG | OXYGEN SATURATION: 95 % | TEMPERATURE: 96 F | BODY MASS INDEX: 24.47 KG/M2 | WEIGHT: 138.13 LBS | SYSTOLIC BLOOD PRESSURE: 128 MMHG | HEART RATE: 97 BPM
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
## 2024-10-25 PROCEDURE — 99999 PR PBB SHADOW E&M-EST. PATIENT-LVL IV: CPT | Mod: PBBFAC,,, | Performed by: PHYSICIAN ASSISTANT

## 2024-10-25 NOTE — LETTER
October 25, 2024      [facility]  [address] [place] [address] DR RACHELLE BARBOSA [address]  Phone: [phone]  Fax: [phone]       Patient: Cassandra Campos   YOB: 1950  Date of Visit: 10/25/2024    To Whom It May Concern:    Clay Campos  was at [facility] on 10/25/2024. She may return to work/school on 10/28/24 with no restrictions. If you have any questions or concerns, or if I can be of further assistance, please do not hesitate to contact me.    Sincerely,    Selena Thrasher PA-C

## 2024-10-25 NOTE — PROGRESS NOTES
Subjective:       Patient ID: Cassandra Campos is a 74 y.o. female.    Chief Complaint: Follow-up (RTW letter)      HPI:  Patient presents to clinic for return to work letter. She was off work for 4 weeks following a fall. She denies any fracture. 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 She is without any complaints today.     Review of Systems   Constitutional:  Negative for chills, fatigue, fever and unexpected weight change.   Eyes:  Negative for visual disturbance.   Respiratory:  Negative for shortness of breath.    Cardiovascular:  Negative for chest pain.   Musculoskeletal:  Negative for myalgias.   Neurological:  Negative for headaches.       Objective:      Physical Exam  Vitals and nursing note reviewed.   Constitutional:       General: She is not in acute distress.     Appearance: She is well-developed.   HENT:      Head: Normocephalic and atraumatic.   Eyes:      General: Lids are normal. No scleral icterus.     Extraocular Movements: Extraocular movements intact.      Conjunctiva/sclera: Conjunctivae normal.   Cardiovascular:      Rate and Rhythm: Normal rate and regular rhythm.   Pulmonary:      Effort: Pulmonary effort is normal.      Breath sounds: Normal breath sounds. No decreased breath sounds, wheezing, rhonchi or rales.   Neurological:      Mental Status: She is alert.      Cranial Nerves: No cranial nerve deficit.   Psychiatric:         Mood and Affect: Mood and affect normal.         Assessment:       1. Fall, subsequent encounter        Plan:   1. Fall, subsequent encounter      RTW letter provided for 10/28/24

## 2024-10-28 DIAGNOSIS — R07.89 CHEST WALL PAIN FOLLOWING SURGERY: ICD-10-CM

## 2024-10-28 DIAGNOSIS — G89.18 CHEST WALL PAIN FOLLOWING SURGERY: ICD-10-CM

## 2024-10-28 DIAGNOSIS — C34.32 MALIGNANT NEOPLASM OF LOWER LOBE OF LEFT LUNG: ICD-10-CM

## 2024-10-28 RX ORDER — CODEINE PHOSPHATE AND GUAIFENESIN 10; 100 MG/5ML; MG/5ML
5 SOLUTION ORAL 3 TIMES DAILY
Qty: 473 ML | Refills: 0 | Status: SHIPPED | OUTPATIENT
Start: 2024-10-28

## 2024-11-07 ENCOUNTER — OFFICE VISIT (OUTPATIENT)
Dept: SPORTS MEDICINE | Facility: CLINIC | Age: 74
End: 2024-11-07
Payer: MEDICARE

## 2024-11-07 VITALS — HEIGHT: 63 IN | BODY MASS INDEX: 24.45 KG/M2 | WEIGHT: 138 LBS

## 2024-11-07 DIAGNOSIS — M17.0 PRIMARY OSTEOARTHRITIS OF BOTH KNEES: Primary | ICD-10-CM

## 2024-11-07 PROCEDURE — 99999 PR PBB SHADOW E&M-EST. PATIENT-LVL III: CPT | Mod: PBBFAC,,, | Performed by: STUDENT IN AN ORGANIZED HEALTH CARE EDUCATION/TRAINING PROGRAM

## 2024-11-07 RX ORDER — TRIAMCINOLONE ACETONIDE 40 MG/ML
40 INJECTION, SUSPENSION INTRA-ARTICULAR; INTRAMUSCULAR
Status: DISCONTINUED | OUTPATIENT
Start: 2024-11-07 | End: 2024-11-07 | Stop reason: HOSPADM

## 2024-11-07 RX ADMIN — TRIAMCINOLONE ACETONIDE 40 MG: 40 INJECTION, SUSPENSION INTRA-ARTICULAR; INTRAMUSCULAR at 11:11

## 2024-11-07 NOTE — PROGRESS NOTES
Patient ID: Cassandra Campos  YOB: 1950  MRN: [id number]    Chief Complaint: Pain of the Left Knee and Pain of the Right Knee      History of Present Illness: Cassandra Campos is a 74 y.o. female who presents today with bilateral knee pain, left greater than right.  Patient last seen in clinic on 8/21/2024 and received Synvisc One injections.  Received a CSI to the right knee on 6/12/2024 and left knee on 6/26/2024.  Received a Toradol injection on 7/18/2024.  Currently rates her pain at a 7/10 and would like to repeat injections today.      7/18/2024 Interval History of Present Illness: Cassandra Campos is a right-hand dominant 73 y.o. female who presents today with bilateral knee pain, right greater than left.  Reports that she bumped her right knee recently and has increased her pain. Last seen in clinic on 6/26/2024 and received CSI to the    Left knee.  Received cSI to the right knee on 6/12/2024.  Currently rates pain at a 10/10 with constant throbbing pain that keeps her up at night.  Patient reports pain when walking and sitting.  Completed PT last week and currently taking Mobic and Tylenol.     6/26/2024 Interval History of Present Illness: Cassandra Campos is a right-hand dominant 73 y.o. female who presents today with left knee and left shoulder pain.   Patient notes pain is at worst a 10/10 at times affecting her activity of daily living and thus her quality of life.  She has not using any devices to assist with ambulation nor is she wearing any knee braces.  She states she takes Tylenol as needed to help with the pain .  She states she has having a hard time walking long distances, going from a sitting to standing or standing to seated position, unlevel terrain or stairs.  She also complains of left shoulder pain and dysfunction that had started 3-4 weeks . Her symptoms included pain and lack of range of motion. Her pain was aggravated by overhead movement, reaching  movements. She had tried rest, activity modification, Muscle relaxer, ibuprofen, heat and ice.     6/12/2024 Interval History of Present Illness: Cassandra Campos is a right-hand dominant 73 y.o. female who presents today with bilateral shoulder pain and bilateral knee pain.  73-year-old female with history of HTN, HLD, and DM  with c/o left shoulder pain.  States pain to left shoulder with touch and movement. States difficulty lifting shoulder above head and mostly keeping her up at night.. States taking OTC NSAIDs with minimal relief. She was seen by  POLO Alan and received left knee CSI on 3/22/24. Received SA steroid injection by Dr. Palma on 2/07/2024.  Pt states she was concerned she is still having pain. MRI left shoulder on file. Patient is currently doing Home health physical therapy.            Past Medical History:   Past Medical History:   Diagnosis Date    Arthritis     hands    Bilateral bunions     Borderline glaucoma     De Quervain's disease (radial styloid tenosynovitis)     Gastritis     upper GI 2/2017    Hydradenitis     Hyperlipidemia     Hypertension     Insomnia     Migraines 02/01/2000    Nasal septum perforation     Obesity     Pneumonia     Restrictive airway disease     Sleep apnea     SVT (supraventricular tachycardia) 09/2013    Trigger finger     Type 2 diabetes mellitus 2012     am 02/01/2024     Past Surgical History:   Procedure Laterality Date    AXILLARY HIDRADENITIS EXCISION Bilateral     BONE EXOSTOSIS EXCISION Right 07/25/2018    Procedure: EXCISION, EXOSTOSIS;  Surgeon: Jayro Pedraza Sr., MD;  Location: [facility] OR;  Service: Orthopedics;  Laterality: Right;    BREAST BIOPSY Bilateral     both benign    BREAST SURGERY  07/1998    CARPAL TUNNEL RELEASE      bilateral    CARPAL TUNNEL RELEASE Right 02/09/2024    Procedure: RELEASE, CARPAL TUNNEL;  Surgeon: Jaime Oswald MD;  Location: [facility] OR;  Service: Orthopedics;  Laterality: Right;    CARPAL TUNNEL RELEASE  Left 2024    Procedure: RELEASE, CARPAL TUNNEL;  Surgeon: Jaime Oswald MD;  Location: [facility] OR;  Service: Orthopedics;  Laterality: Left;    CATARACT EXTRACTION Bilateral     OU     SECTION  1979    CHOLECYSTECTOMY  2014    COLONOSCOPY N/A 10/02/2020    Procedure: COLONOSCOPY;  Surgeon: Tushar Edwards MD;  Location: [facility];  Service: Endoscopy;  Laterality: N/A;    COLONOSCOPY W/ POLYPECTOMY  10/02/2020    Polyps x3, repeat 5 years; Tushar Edwards MD     CYST REMOVAL  2015    sebaceous cyst removed from face    DE QUERVAIN'S RELEASE Left 2020    Procedure: RELEASE, HAND, FOR DEQUERVAIN'S TENOSYNOVITIS;  Surgeon: Jaime Oswald MD;  Location: [facility] OR;  Service: Orthopedics;  Laterality: Left;    DE QUERVAIN'S RELEASE Right 2020    Procedure: RELEASE, HAND, FOR DEQUERVAIN'S TENOSYNOVITIS;  Surgeon: Jaime Oswald MD;  Location: [facility];  Service: Orthopedics;  Laterality: Right;    ENDOBRONCHIAL ULTRASOUND Bilateral 04/10/2024    Procedure: ENDOBRONCHIAL ULTRASOUND (EBUS);  Surgeon: Adrian Robin MD;  Location: [facility];  Service: Pulmonary;  Laterality: Bilateral;    EYE SURGERY      gastric sleeve  2017    Dr. Watson    INJECTION OF ANESTHETIC AGENT AROUND MULTIPLE INTERCOSTAL NERVES  5/10/2024    Procedure: BLOCK, NERVE, INTERCOSTAL, 2 OR MORE;  Surgeon: Mike Nuñez MD;  Location: [facility];  Service: Cardiothoracic;;    KNEE SURGERY Right     OLECRANON BURSECTOMY Right 2018    Procedure: BURSECTOMY, OLECRANON;  Surgeon: Jayro Pedraza Sr., MD;  Location: [facility] OR;  Service: Orthopedics;  Laterality: Right;    SURGICAL REMOVAL OF BUNION WITH OSTEOTOMY OF METATARSAL BONE Left 05/10/2019    Procedure: BUNIONECTOMY, WITH METATARSAL OSTEOTOMY;  Surgeon: Srinivasan Villanueva DPM;  Location: [facility] OR;  Service: Podiatry;  Laterality: Left;    SURGICAL REMOVAL OF BUNION WITH OSTEOTOMY OF METATARSAL BONE Right 2019    Procedure:  BUNIONECTOMY, WITH METATARSAL OSTEOTOMY;  Surgeon: Srinivasan Villanueva DPM;  Location: [facility] OR;  Service: Podiatry;  Laterality: Right;    SURGICAL REMOVAL OF LYMPH NODE Left 5/10/2024    Procedure: EXCISION, LYMPH NODE;  Surgeon: Mike Nuñez MD;  Location: [facility] OR;  Service: Cardiothoracic;  Laterality: Left;    TONSILLECTOMY, ADENOIDECTOMY  1980s    TRANSESOPHAGEAL ECHOCARDIOGRAM WITH POSSIBLE CARDIOVERSION (LAKESHIA W/ POSS CARDIOVERSION) N/A 5/15/2024    Procedure: Transesophageal echo (LAKESHIA) intra-procedure log documentation;  Surgeon: Twan Pelaez MD;  Location: [facility] CATH LAB;  Service: Cardiology;  Laterality: N/A;    TRIGGER FINGER RELEASE Right 2015    Dr. Milo HALL ROBOTIC RATS,WITH LOBECTOMY,LUNG Left 5/10/2024    Procedure: XI ROBOTIC RATS,WITH LOBECTOMY,LUNG;  Surgeon: Mike Nuñez MD;  Location: [facility] OR;  Service: Cardiothoracic;  Laterality: Left;  Lingulectomy     Family History   Problem Relation Name Age of Onset    Prostate cancer Brother      Diabetes Maternal Aunt Alondra Campos     Diabetes Cousin      Hypertension Maternal Grandmother Portia Orosco      Social History     Socioeconomic History    Marital status:     Number of children: 1   Occupational History    Occupation:  aid   Tobacco Use    Smoking status: Former     Current packs/day: 0.00     Average packs/day: 0.5 packs/day for 42.0 years (21.0 ttl pk-yrs)     Types: Cigarettes     Start date: 1970     Quit date: 2012     Years since quittin.8     Passive exposure: Past    Smokeless tobacco: Never   Substance and Sexual Activity    Alcohol use: Not Currently     Alcohol/week: 1.0 standard drink of alcohol     Types: 1 Glasses of wine per week     Comment: Glass red wine once a every 2 weeks    Drug use: Never    Sexual activity: Not Currently     Partners: Female     Birth control/protection: Abstinence, None   Social History Narrative    Single, part-time teacher. Masters degree biology.       Social Drivers of Health     Financial Resource Strain: Low Risk  (12/19/2023)    Overall Financial Resource Strain (CARDIA)     Difficulty of Paying Living Expenses: Not hard at all   Food Insecurity: Food Insecurity Present (12/19/2023)    Hunger Vital Sign     Worried About Running Out of Food in the Last Year: Never true     Ran Out of Food in the Last Year: Sometimes true   Transportation Needs: No Transportation Needs (12/19/2023)    PRAPARE - Transportation     Lack of Transportation (Medical): No     Lack of Transportation (Non-Medical): No   Physical Activity: Insufficiently Active (12/19/2023)    Exercise Vital Sign     Days of Exercise per Week: 3 days     Minutes of Exercise per Session: 20 min   Stress: No Stress Concern Present (12/19/2023)    [employer] [place] [employer] - Occupational Stress Questionnaire     Feeling of Stress : Not at all   Housing Stability: Low Risk  (12/19/2023)    Housing Stability Vital Sign     Unable to Pay for Housing in the Last Year: No     Number of Places Lived in the Last Year: 1     Unstable Housing in the Last Year: No     Medication List with Changes/Refills   Current Medications    ALBUTEROL (PROVENTIL/VENTOLIN HFA) 90 MCG/ACTUATION INHALER    INHALE 2 PUFFS INTO THE LUNGS EVERY 4 HOURS AS NEEDED FOR WHEEZING OR SHORTNESS OF BREATH.    BLOOD-GLUCOSE METER (ACCU-CHEK GUIDE ME GLUCOSE MTR) MISC    use to check blood glucose 2 times a day    DICLOFENAC SODIUM (VOLTAREN ARTHRITIS PAIN) 1 % GEL    Apply 2 g topically once daily.    ESZOPICLONE (LUNESTA) 3 MG TAB    Take 1 tablet (3 mg total) by mouth every evening.    GUAIFENESIN-CODEINE 100-10 MG/5 ML (TUSSI-ORGANIDIN NR)  MG/5 ML SYRUP    Take 5 mLs by mouth 3 (three) times daily.    LIDOCAINE (LIDODERM) 5 %    Place 1 patch onto the skin once daily. Remove & Discard patch within 12 hours or as directed by MD    LORAZEPAM (ATIVAN) 1 MG TABLET    Take 1 tablet (1 mg total) by mouth 2 (two) times  daily.    LOSARTAN (COZAAR) 25 MG TABLET    TAKE 1 TABLET BY MOUTH ONCE  DAILY    METOPROLOL SUCCINATE (TOPROL-XL) 50 MG 24 HR TABLET    TAKE 1 TABLET BY MOUTH ONCE  DAILY    OMEPRAZOLE (PRILOSEC) 20 MG CAPSULE    TAKE 1 CAPSULE BY MOUTH ONCE  DAILY    SEMAGLUTIDE (OZEMPIC) 2 MG/DOSE (8 MG/3 ML) PNIJ    Inject 2 mg into the skin every 7 days.    TRIAMCINOLONE ACETONIDE 0.025 % LOTN    Apply small amount to affective areas twice a day  take cool showers or baths     Review of patient's allergies indicates:  No Known Allergies    Physical Exam:   Body mass index is 24.45 kg/m².    GENERAL: Well appearing, in no acute distress.  HEAD: Normocephalic and atraumatic.  ENT: External ears and nose grossly normal.  EYES: EOMI bilaterally  PULMONARY: Respirations are grossly even and non-labored.  NEURO: Awake, alert, and oriented x 3.  SKIN: No obvious rashes appreciated.  PSYCH: Mood & affect are appropriate.    Detailed MSK exam:     Left knee exam:   -ROM: extension 0, flexion 120  -TTP: Medial joint line  -effusion: none  -Patellar apprehension negative  -Thor test negative  -stable to varus and valgus stress tests  -Lachman test negative, anterior drawer test negative, posterior drawer test negative    Right knee exam:   -ROM: extension 0, flexion 120  -TTP: Medial joint line  -effusion: none  -Patellar apprehension negative  -Thor test negative  -stable to varus and valgus stress tests  -Lachman test negative, anterior drawer test negative, posterior drawer test negative      Imaging:  X-Ray Elbow Complete Left  Narrative: EXAM:    XR ELBOW COMPLETE 3 VIEW LEFT    CLINICAL HISTORY: [M25.522]-Pain in left elbow.    COMPARISON: None    FINDINGS:  This examination consists of 5 views of the left elbow.  There is no fracture.  There is no dislocation.  Impression:  Normal Study    Finalized on: 10/23/2024 4:11 PM By:  Venu Perez MD  BRR# [id number]      2024-10-23 16:13:[id number]    [facility]  Sports Medicine  -  Guidance for Needle Placement  Naman Barton MD     10/23/2024  3:00 PM  Sports Medicine US - Guidance for Needle Placement    Date/Time: 10/23/2024 2:40 PM    Performed by: Naman Barton MD  Authorized by: Naman Barton MD  Preparation: Patient was prepped and   draped in the usual sterile fashion.  Local anesthesia used: no    Anesthesia:  Local anesthesia used: no    Sedation:  Patient sedated: no    Patient tolerance: patient tolerated the procedure well with no immediate   complications  Comments: Ultrasound guidance was used for needle localization. Images   were saved and stored for documentation. The appropriate structures were   visualized. Dynamic visualization of the needle was continuous throughout   the procedures and maintained good position.         Relevant imaging results were reviewed and interpreted by me and per my read shows mild arthritic changes bilaterally.  This was discussed with the patient and / or family today.     Assessment:  Cassandra Campos is a 74 y.o. female following up for bilateral knee pain. Interested in repeating steroid injections today.   Plan: Steroid injection given today (see separate procedure note for details). We discussed the proper protocols after the injection such as no submerging pools, baths tubs, or hot tubs for 24 hr.  Showering is okay today.  We also discussed that blood sugars can be elevated after an injection and asked patient to properly checked her sugars over the next few days and contact their PCP if there are any concerns.  We discussed red flags such as fevers, chills, red, warm, tender joint at the area of injection to please seek medical care immediately.   Continue conservative management for pain.   Follow up as needed. All questions answered.     Primary osteoarthritis of both knees  -     Sports Medicine US - Guidance for Needle Placement  -     Large Joint Aspiration/Injection: bilateral knee         Ultrasound guidance was  used for needle localization. Images were saved and stored for documentation. The appropriate structures were visualized. Dynamic visualization of the needle was continuous throughout the procedures and maintained good position.      MEDICAL NECESSITY FOR VISCOSUPPLEMENTATION: After thorough evaluation of the patient, I have determined that visco-supplementation is medically necessary. The patient has painful degenerative changes of the knee with failure of conservative treatments including lifestyle modifications and rehabilitation exercises.  Oral analgesis/NSAIDs have not adequately controlled symptoms and there is radiographic evidence of Kellgren Sanchez grade 2 or greater osteoarthritic changes, or in lack of radiographic evidence, there is arthroscopic or other evidence of chondrosis.     I spent a total of 30 minutes on the day of the visit.  This includes face to face time and non-face to face time preparing to see the patient (eg, review of tests), obtaining and/or reviewing separately obtained history, documenting clinical information in the electronic or other health record, independently interpreting results and communicating results to the patient/family/caregiver, or care coordinator.      Electronically signed:  Naman Barton MD, MPH  11/07/2024  11:31 AM

## 2024-11-07 NOTE — PROCEDURES
Sports Medicine US - Guidance for Needle Placement    Date/Time: 11/7/2024 11:20 AM    Performed by: Naman Barton MD  Authorized by: Naman Barton MD  Preparation: Patient was prepped and draped in the usual sterile fashion.  Local anesthesia used: no    Anesthesia:  Local anesthesia used: no    Sedation:  Patient sedated: no    Patient tolerance: patient tolerated the procedure well with no immediate complications  Comments: Ultrasound guidance was used for needle localization. Images were saved and stored for documentation. The appropriate structures were visualized. Dynamic visualization of the needle was continuous throughout the procedures and maintained good position.

## 2024-11-07 NOTE — PROCEDURES
Large Joint Aspiration/Injection: bilateral knee    Date/Time: 11/7/2024 11:20 AM    Performed by: Naman Barton MD  Authorized by: Naman Barton MD    Consent Done?:  Yes (Verbal)  Indications:  Arthritis and pain  Site marked: the procedure site was marked    Timeout: prior to procedure the correct patient, procedure, and site was verified    Prep: patient was prepped and draped in usual sterile fashion    Local anesthetic:  Bupivacaine 0.5% without epinephrine and lidocaine 1% without epinephrine    Details:  Needle Size:  21 G  Ultrasonic Guidance for needle placement?: Yes    Images are saved and documented.  Approach:  Lateral (superior)  Location:  Knee  Laterality:  Bilateral  Site:  Bilateral knee  Medications (Right):  40 mg triamcinolone acetonide 40 mg/mL  Medications (Left):  40 mg triamcinolone acetonide 40 mg/mL  Patient tolerance:  Patient tolerated the procedure well with no immediate complications     Ultrasound guidance was used for needle localization. Images were saved and stored for documentation. The appropriate structures were visualized. Dynamic visualization of the needle was continuous throughout the procedures and maintained good position.

## 2024-11-07 NOTE — PATIENT INSTRUCTIONS
Assessment:  Cassandra Campos is a 74 y.o. female   Chief Complaint   Patient presents with    Left Knee - Pain    Right Knee - Pain       Encounter Diagnosis   Name Primary?    Primary osteoarthritis of both knees Yes        Plan:  Ultrasound guided cortisone injections to bilateral knees  We discussed the proper protocols after the injection such as no submerging pools, baths tubs, or hot tubs for 24 hr.  Showering is okay today.  We also discussed that blood sugars can be elevated after an injection and asked patient to properly checked her sugars over the next few days and contact their PCP if there are any concerns.  We discussed red flags such as fevers, chills, red, warm, tender joint at the area of injection to please seek medical care immediately.    Follow up as needed    Follow-up: as needed.    Thank you for choosing [facility] and Dr. Naman Barton for your orthopedic & sports medicine care. It is our goal to provide you with exceptional care that will help keep you healthy, active, and get you back in the game.    Please do not hesitate to reach out to us via email, phone, or MyChart with any questions, concerns, or feedback.    If you felt that you received exemplary care today, please consider leaving us feedback on [employer] at:  [email or web address]    If you are experiencing pain/discomfort ,or have questions after 5pm and would like to be connected to the [facility] on-call team, please call this number and specify which Sports Medicine provider is treating you: [phone]

## 2024-11-08 ENCOUNTER — OFFICE VISIT (OUTPATIENT)
Dept: INTERNAL MEDICINE | Facility: CLINIC | Age: 74
End: 2024-11-08
Payer: MEDICARE

## 2024-11-08 VITALS
TEMPERATURE: 98 F | DIASTOLIC BLOOD PRESSURE: 72 MMHG | HEIGHT: 63 IN | SYSTOLIC BLOOD PRESSURE: 132 MMHG | BODY MASS INDEX: 25.32 KG/M2 | HEART RATE: 73 BPM | OXYGEN SATURATION: 96 % | WEIGHT: 142.88 LBS

## 2024-11-08 DIAGNOSIS — F41.0 PANIC ATTACK AS REACTION TO STRESS: Primary | ICD-10-CM

## 2024-11-08 DIAGNOSIS — G47.00 INSOMNIA, UNSPECIFIED TYPE: ICD-10-CM

## 2024-11-08 DIAGNOSIS — F43.0 PANIC ATTACK AS REACTION TO STRESS: Primary | ICD-10-CM

## 2024-11-08 DIAGNOSIS — E11.65 TYPE 2 DIABETES MELLITUS WITH HYPERGLYCEMIA, WITHOUT LONG-TERM CURRENT USE OF INSULIN: ICD-10-CM

## 2024-11-08 DIAGNOSIS — E11.9 CONTROLLED TYPE 2 DIABETES MELLITUS WITHOUT COMPLICATION, WITHOUT LONG-TERM CURRENT USE OF INSULIN: ICD-10-CM

## 2024-11-08 PROCEDURE — 99999 PR PBB SHADOW E&M-EST. PATIENT-LVL IV: CPT | Mod: PBBFAC,,,

## 2024-11-08 RX ORDER — LORAZEPAM 1 MG/1
1 TABLET ORAL 2 TIMES DAILY
Qty: 60 TABLET | Refills: 1 | Status: SHIPPED | OUTPATIENT
Start: 2024-11-26

## 2024-11-08 RX ORDER — LANCETS
EACH MISCELLANEOUS
Qty: 100 EACH | Refills: 1 | Status: SHIPPED | OUTPATIENT
Start: 2024-11-08

## 2024-11-08 RX ORDER — ESZOPICLONE 3 MG/1
3 TABLET, FILM COATED ORAL NIGHTLY
Qty: 30 TABLET | Refills: 1 | Status: SHIPPED | OUTPATIENT
Start: 2024-11-26

## 2024-11-08 RX ORDER — SEMAGLUTIDE 2.68 MG/ML
2 INJECTION, SOLUTION SUBCUTANEOUS
Qty: 3 ML | Refills: 2 | Status: SHIPPED | OUTPATIENT
Start: 2024-11-08 | End: 2025-11-08

## 2024-11-08 RX ORDER — DEXTROSE 4 G
TABLET,CHEWABLE ORAL
Qty: 1 EACH | Refills: 0 | Status: SHIPPED | OUTPATIENT
Start: 2024-11-08

## 2024-11-08 NOTE — PROGRESS NOTES
Cassandra Campos  11/08/2024  [id number]    Emil Zhang MD  Patient Care Team:  Emil Zhang MD as PCP - General (Family Medicine)  Duane Davila MD (Cardiovascular Disease)  Sultana Sofia MD as Consulting Physician (Dermatology)  Eduardo Lawrence MD (Ophthalmology)  Twan Pelaez MD as Consulting Physician (Cardiology)  Deanna Soto DPM as Consulting Physician (Podiatry)  Rashaad Watson MD (Inactive) as Consulting Physician (General Surgery)          Visit Type:an urgent visit for an existing problem     Chief Complaint:  Chief Complaint   Patient presents with    Insomnia        History of Present Illness:    History of Present Illness    CHIEF COMPLAINT:  Ms. Campos presents today for difficulty sleeping and medication refills.    SLEEP ISSUES:  She reports difficulty staying asleep, waking up at 3 or 4 AM and being unable to fall back asleep. She is currently taking Lunesta 3 mg nightly for sleep. The medication was effective until a recent two-week break in treatment due to prescription refill issues.    MEDICATION MANAGEMENT:  She reports issues with end-of-month prescription refills, leading to periods without medication. She is currently taking Lorazepam and Esomeprazole. She requested a change in refill date to November 26th to ensure consistent access to her medications. She expresses concern about the impact on her sleep, particularly as a cancer patient requiring adequate rest.    DIABETES MANAGEMENT:  She reports her glucose meter is not functioning and has requested a new one. She typically checks her blood sugar twice daily but has been unable to do so recently due to equipment malfunction. She is taking Ozempic but experienced an interruption in treatment due to cancer surgery and a fall. She resumed Ozempic therapy last Friday. She expresses concern that her glucose may be elevated due to inability to monitor it recently. She indicates needing a new prescription for Ozempic, as  she believes her current prescription was for four doses, and she had to discard the remaining doses.    MEDICAL HISTORY:  She is a cancer patient who recently underwent cancer surgery. She reports a recent fall.      ROS:  General: -fever, -chills, -fatigue, -weight gain, -weight loss  Eyes: -vision changes, -redness, -discharge  ENT: -ear pain, -nasal congestion, -sore throat  Cardiovascular: -chest pain, -palpitations, -lower extremity edema  Respiratory: -cough, -shortness of breath  Gastrointestinal: -abdominal pain, -nausea, -vomiting, -diarrhea, -constipation, -blood in stool  Genitourinary: -dysuria, -hematuria, -frequency  Musculoskeletal: -joint pain, -muscle pain  Skin: -rash, -lesion  Neurological: -headache, -dizziness, -numbness, -tingling  Psychiatric: -anxiety, -depression, +sleep difficulty            History:  Past Medical History:   Diagnosis Date    Arthritis     hands    Bilateral bunions     Borderline glaucoma     De Quervain's disease (radial styloid tenosynovitis)     Gastritis     upper GI 2/2017    Hydradenitis     Hyperlipidemia     Hypertension     Insomnia     Migraines 02/01/2000    Nasal septum perforation     Obesity     Pneumonia     Restrictive airway disease     Sleep apnea     SVT (supraventricular tachycardia) 09/2013    Trigger finger     Type 2 diabetes mellitus 2012     am 02/01/2024     Past Surgical History:   Procedure Laterality Date    AXILLARY HIDRADENITIS EXCISION Bilateral     BONE EXOSTOSIS EXCISION Right 07/25/2018    Procedure: EXCISION, EXOSTOSIS;  Surgeon: Jayro Pedraza Sr., MD;  Location: [facility];  Service: Orthopedics;  Laterality: Right;    BREAST BIOPSY Bilateral     both benign    BREAST SURGERY  07/1998    CARPAL TUNNEL RELEASE      bilateral    CARPAL TUNNEL RELEASE Right 02/09/2024    Procedure: RELEASE, CARPAL TUNNEL;  Surgeon: Jaime Oswald MD;  Location: [facility];  Service: Orthopedics;  Laterality: Right;    CARPAL TUNNEL RELEASE Left  2024    Procedure: RELEASE, CARPAL TUNNEL;  Surgeon: Jaime Oswald MD;  Location: [facility] OR;  Service: Orthopedics;  Laterality: Left;    CATARACT EXTRACTION Bilateral     OU     SECTION  1979    CHOLECYSTECTOMY  2014    COLONOSCOPY N/A 10/02/2020    Procedure: COLONOSCOPY;  Surgeon: Tushar Edwards MD;  Location: [facility];  Service: Endoscopy;  Laterality: N/A;    COLONOSCOPY W/ POLYPECTOMY  10/02/2020    Polyps x3, repeat 5 years; Tushar Edwards MD     CYST REMOVAL  2015    sebaceous cyst removed from face    DE QUERVAIN'S RELEASE Left 2020    Procedure: RELEASE, HAND, FOR DEQUERVAIN'S TENOSYNOVITIS;  Surgeon: Jaime Oswald MD;  Location: [facility] OR;  Service: Orthopedics;  Laterality: Left;    DE QUERVAIN'S RELEASE Right 2020    Procedure: RELEASE, HAND, FOR DEQUERVAIN'S TENOSYNOVITIS;  Surgeon: Jaime Oswald MD;  Location: [facility];  Service: Orthopedics;  Laterality: Right;    ENDOBRONCHIAL ULTRASOUND Bilateral 04/10/2024    Procedure: ENDOBRONCHIAL ULTRASOUND (EBUS);  Surgeon: Adrian Robin MD;  Location: [facility];  Service: Pulmonary;  Laterality: Bilateral;    EYE SURGERY      gastric sleeve  2017    Dr. Watson    INJECTION OF ANESTHETIC AGENT AROUND MULTIPLE INTERCOSTAL NERVES  5/10/2024    Procedure: BLOCK, NERVE, INTERCOSTAL, 2 OR MORE;  Surgeon: Mike Nuñez MD;  Location: [facility];  Service: Cardiothoracic;;    KNEE SURGERY Right     OLECRANON BURSECTOMY Right 2018    Procedure: BURSECTOMY, OLECRANON;  Surgeon: Jayro Pedraza Sr., MD;  Location: [facility] OR;  Service: Orthopedics;  Laterality: Right;    SURGICAL REMOVAL OF BUNION WITH OSTEOTOMY OF METATARSAL BONE Left 05/10/2019    Procedure: BUNIONECTOMY, WITH METATARSAL OSTEOTOMY;  Surgeon: Srinivasan Villanueva DPM;  Location: [facility] OR;  Service: Podiatry;  Laterality: Left;    SURGICAL REMOVAL OF BUNION WITH OSTEOTOMY OF METATARSAL BONE Right 2019    Procedure: BUNIONECTOMY,  WITH METATARSAL OSTEOTOMY;  Surgeon: Srinivasan Villanueva DPM;  Location: [facility] OR;  Service: Podiatry;  Laterality: Right;    SURGICAL REMOVAL OF LYMPH NODE Left 5/10/2024    Procedure: EXCISION, LYMPH NODE;  Surgeon: Mike Nuñez MD;  Location: [facility] OR;  Service: Cardiothoracic;  Laterality: Left;    TONSILLECTOMY, ADENOIDECTOMY  1980s    TRANSESOPHAGEAL ECHOCARDIOGRAM WITH POSSIBLE CARDIOVERSION (LAKESHIA W/ POSS CARDIOVERSION) N/A 5/15/2024    Procedure: Transesophageal echo (LAKESHIA) intra-procedure log documentation;  Surgeon: Twan Pelaez MD;  Location: [facility] CATH LAB;  Service: Cardiology;  Laterality: N/A;    TRIGGER FINGER RELEASE Right 2015    Dr. Milo HALL ROBOTIC RATS,WITH LOBECTOMY,LUNG Left 5/10/2024    Procedure: XI ROBOTIC RATS,WITH LOBECTOMY,LUNG;  Surgeon: Mike Nuñez MD;  Location: [facility] OR;  Service: Cardiothoracic;  Laterality: Left;  Lingulectomy     Family History   Problem Relation Name Age of Onset    Prostate cancer Brother      Diabetes Maternal Aunt Alondra Campos     Diabetes Cousin      Hypertension Maternal Grandmother Portia Orosco      Social History     Socioeconomic History    Marital status:     Number of children: 1   Occupational History    Occupation:  aid   Tobacco Use    Smoking status: Former     Current packs/day: 0.00     Average packs/day: 0.5 packs/day for 42.0 years (21.0 ttl pk-yrs)     Types: Cigarettes     Start date: 1970     Quit date: 2012     Years since quittin.8     Passive exposure: Past    Smokeless tobacco: Never   Substance and Sexual Activity    Alcohol use: Not Currently     Alcohol/week: 1.0 standard drink of alcohol     Types: 1 Glasses of wine per week     Comment: Glass red wine once a every 2 weeks    Drug use: Never    Sexual activity: Not Currently     Partners: Female     Birth control/protection: Abstinence, None   Social History Narrative    Single, part-time teacher. Masters degree biology.      Social  Drivers of Health     Financial Resource Strain: Low Risk  (12/19/2023)    Overall Financial Resource Strain (CARDIA)     Difficulty of Paying Living Expenses: Not hard at all   Food Insecurity: Food Insecurity Present (12/19/2023)    Hunger Vital Sign     Worried About Running Out of Food in the Last Year: Never true     Ran Out of Food in the Last Year: Sometimes true   Transportation Needs: No Transportation Needs (12/19/2023)    PRAPARE - Transportation     Lack of Transportation (Medical): No     Lack of Transportation (Non-Medical): No   Physical Activity: Insufficiently Active (12/19/2023)    Exercise Vital Sign     Days of Exercise per Week: 3 days     Minutes of Exercise per Session: 20 min   Stress: No Stress Concern Present (12/19/2023)    [place] [employer] - Occupational Stress Questionnaire     Feeling of Stress : Not at all   Housing Stability: Low Risk  (12/19/2023)    Housing Stability Vital Sign     Unable to Pay for Housing in the Last Year: No     Number of Places Lived in the Last Year: 1     Unstable Housing in the Last Year: No     Patient Active Problem List   Diagnosis    Hyperlipidemia associated with type 2 diabetes mellitus    Insomnia    Hip arthritis    Obesity    Paroxysmal SVT (supraventricular tachycardia)    GERD (gastroesophageal reflux disease)    Prepatellar bursitis of right knee    Transient synovitis of right knee    Chondromalacia of right knee    Primary hypertension    Calcified granuloma of lung    Osteopenia    Onychomycosis of multiple toenails with type 2 diabetes mellitus    Cough    Controlled type 2 diabetes mellitus without complication, without long-term current use of insulin    Secondary hypertension    Unequal blood pressures in paired extremities    De Quervain's disease (radial styloid tenosynovitis)    Breast screening    Tenosynovitis, de Quervain    Skin tag    Panic attack as reaction to stress    Mild intermittent asthma    BMI  31.0-31.9,adult    Elevated liver enzymes    Iron deficiency    Aortic atherosclerosis    Anxiety    Bilateral carpal tunnel syndrome    Major depressive disorder, single episode, mild    Solitary pulmonary nodule    Pelvic pain    Abdominal wall mass    Hilar mass    Hilar adenopathy    Malignant neoplasm of lower lobe of left lung    Preop cardiovascular exam    Calcification of aorta    Chronic bronchitis, unspecified chronic bronchitis type    Fall    Controlled type 2 diabetes mellitus without complication, with long-term current use of insulin    Typical atrial flutter    Hand swelling    Weakness    Somnolence    Tachycardia    Localized edema     Review of patient's allergies indicates:  No Known Allergies    The following were reviewed at this visit: active problem list, medication list, allergies, family history, social history, and health maintenance.    Medications:  Current Outpatient Medications on File Prior to Visit   Medication Sig Dispense Refill    albuterol (PROVENTIL/VENTOLIN HFA) 90 mcg/actuation inhaler INHALE 2 PUFFS INTO THE LUNGS EVERY 4 HOURS AS NEEDED FOR WHEEZING OR SHORTNESS OF BREATH. 18 g 1    blood-glucose meter (ACCU-CHEK GUIDE ME GLUCOSE MTR) Misc use to check blood glucose 2 times a day 1 each 0    diclofenac sodium (VOLTAREN ARTHRITIS PAIN) 1 % Gel Apply 2 g topically once daily. 400 g 1    LIDOcaine (LIDODERM) 5 % Place 1 patch onto the skin once daily. Remove & Discard patch within 12 hours or as directed by MD 15 patch 1    losartan (COZAAR) 25 MG tablet TAKE 1 TABLET BY MOUTH ONCE  DAILY (Patient taking differently: Take 25 mg by mouth every evening.) 90 tablet 3    metoprolol succinate (TOPROL-XL) 50 MG 24 hr tablet TAKE 1 TABLET BY MOUTH ONCE  DAILY 90 tablet 3    omeprazole (PRILOSEC) 20 MG capsule TAKE 1 CAPSULE BY MOUTH ONCE  DAILY 90 capsule 3    triamcinolone acetonide 0.025 % Lotn Apply small amount to affective areas twice a day  take cool showers or baths 60 mL 0     [DISCONTINUED] eszopiclone (LUNESTA) 3 mg Tab Take 1 tablet (3 mg total) by mouth every evening. 30 tablet 2    [DISCONTINUED] LORazepam (ATIVAN) 1 MG tablet Take 1 tablet (1 mg total) by mouth 2 (two) times daily. 60 tablet 2    [DISCONTINUED] semaglutide (OZEMPIC) 2 mg/dose (8 mg/3 mL) PnIj Inject 2 mg into the skin every 7 days. 3 mL 2    [DISCONTINUED] guaiFENesin-codeine 100-10 mg/5 ml (TUSSI-ORGANIDIN NR)  mg/5 mL syrup Take 5 mLs by mouth 3 (three) times daily. (Patient not taking: Reported on 11/8/2024) 473 mL 0     Current Facility-Administered Medications on File Prior to Visit   Medication Dose Route Frequency Provider Last Rate Last Admin    sodium chloride 0.9% flush 10 mL  10 mL Intravenous PRN Mike Nuñez MD        sodium chloride 0.9% flush 10 mL  10 mL Intravenous PRN Mike Nuñez MD        [DISCONTINUED] triamcinolone acetonide injection 40 mg  40 mg Intra-articular  Naman Barton MD   40 mg at 11/07/24 1120    [DISCONTINUED] triamcinolone acetonide injection 40 mg  40 mg Intra-articular  Naman Barton MD   40 mg at 11/07/24 1120       Medications have been reviewed and reconciled with patient at this visit.  Barriers to medications reviewed with patient.    Adverse reactions to current medications reviewed with patient..    Over the counter medications reviewed and reconciled with patient.    Exam:  Wt Readings from Last 3 Encounters:   11/08/24 64.8 kg (142 lb 13.7 oz)   11/07/24 62.6 kg (138 lb)   10/25/24 62.6 kg (138 lb 1.9 oz)     Temp Readings from Last 3 Encounters:   11/08/24 97.8 °F (36.6 °C) (Tympanic)   10/25/24 96.1 °F (35.6 °C) (Tympanic)   09/30/24 98.5 °F (36.9 °C) (Tympanic)     BP Readings from Last 3 Encounters:   11/08/24 132/72   10/25/24 128/70   09/30/24 126/64     Pulse Readings from Last 3 Encounters:   11/08/24 73   10/25/24 97   09/30/24 106     Body mass index is 25.31 kg/m².    Physical Exam  Nursing note reviewed.   HENT:      Head:  Normocephalic and atraumatic.   Cardiovascular:      Rate and Rhythm: Normal rate and regular rhythm.      Heart sounds: Normal heart sounds.   Pulmonary:      Effort: Pulmonary effort is normal. No respiratory distress.      Breath sounds: Normal breath sounds.   Neurological:      Mental Status: She is alert and oriented to person, place, and time.   Psychiatric:         Mood and Affect: Mood normal.         Behavior: Behavior normal.         Thought Content: Thought content normal.         Judgment: Judgment normal.           Laboratory Reviewed ({Yes)  Lab Results   Component Value Date    WBC 6.31 07/27/2024    HGB 13.2 07/27/2024    HCT 39.3 07/27/2024     07/27/2024    CHOL 184 12/26/2023    TRIG 128 12/26/2023    HDL 48 12/26/2023    ALT 10 08/28/2024    AST 16 08/28/2024     08/28/2024    K 3.4 (L) 08/28/2024     08/28/2024    CREATININE 0.7 08/28/2024    BUN 6 (L) 08/28/2024    CO2 28 08/28/2024    TSH 3.220 09/12/2022    INR 1.1 05/14/2024    GLUF 121 (H) 12/15/2022    HGBA1C 5.7 (H) 08/28/2024    MICROALBUR 12 08/21/2020       Cassandra was seen today for insomnia.    Diagnoses and all orders for this visit:    Panic attack as reaction to stress  -     eszopiclone (LUNESTA) 3 mg Tab; Take 1 tablet (3 mg total) by mouth every evening.  -     LORazepam (ATIVAN) 1 MG tablet; Take 1 tablet (1 mg total) by mouth 2 (two) times daily.    Insomnia, unspecified type  -     eszopiclone (LUNESTA) 3 mg Tab; Take 1 tablet (3 mg total) by mouth every evening.  -     LORazepam (ATIVAN) 1 MG tablet; Take 1 tablet (1 mg total) by mouth 2 (two) times daily.    Type 2 diabetes mellitus with hyperglycemia, without long-term current use of insulin  -     blood-glucose meter Misc; To check blood glucose 1-2 times daily, to use with insurance preferred meter  -     lancets Misc; To check blood glucose 1-2 times daily, to use with insurance preferred meter  -     blood sugar diagnostic Strp; To check blood  glucose 1-2 times daily, to use with insurance preferred meter  -     semaglutide (OZEMPIC) 2 mg/dose (8 mg/3 mL) PnIj; Inject 2 mg into the skin every 7 days.  -     POCT Glucose, Hand-Held Device    Controlled type 2 diabetes mellitus without complication, without long-term current use of insulin  -     semaglutide (OZEMPIC) 2 mg/dose (8 mg/3 mL) PnIj; Inject 2 mg into the skin every 7 days.  -     POCT Glucose, Hand-Held Device            Assessment & Plan    SLEEP ISSUES:  Assessed sleep issues; patient reports difficulty sleeping after medication interruption.  Considered maintaining current sleep medication regimen (Lunesta and Lorazepam) due to potential drowsiness concerns with increased dosage.  Continued Lunesta 3 mg; prescription to be filled on November 26th.  Continued Lorazepam; prescription to be filled on November 26th.    Informed that I will not be adding any additional medications for sleep  I do not want her oversedated which can increase her fall risks  If lunesta continues to be ineffective, can possibly try seroquel and D/C the lunesta     DIABETES:  Evaluated glucose management; patient restarted Ozempic recently.  Reviewed current glucose level (124), indicating good control.  Ms. Campos to check glucose 2 times daily.  Restarted Ozempic; new prescription sent to pharmacy.  Ordered new glucose meter; sent to pharmacy.  Performed in-office glucose check.  Follow up in February for diabetic eye exam.    FOLLOW UP:  Follow up with Dr. Salgado next month as scheduled.        Visit today included increased complexity associated with the care of the episodic problem Insomnia  , which was addressed while instituting co-management of the longitudinal care of the patient due to the serious and/or complex managed problem(s) .    I have evaluated and discussed management associated with medical care services that serve as the continuing focal point for all needed health care services and/or with medical  care services that are part of ongoing care related to my patient's single, serious condition or a complex condition(s).    I am providing ongoing care and I am the primary care provider for this patient, and they are being managed, monitored, and/or observed for their chronic conditions over time.     I have addressed their ongoing health maintenance requirements and needs for all health care services and reviewed co-management plans provided by specialty providers when available.    Health Maintenance Due   Topic Date Due    Eye Exam  02/01/2025        Care Plan/Goals: Reviewed    Goals         Blood Pressure < 140/90 (pt-stated)             Follow up: No follow-ups on file.    After visit summary was printed and given to patient upon discharge today.  Patient goals and care plan are included in After Visit Summary.

## 2024-11-15 ENCOUNTER — TELEPHONE (OUTPATIENT)
Dept: PAIN MEDICINE | Facility: CLINIC | Age: 74
End: 2024-11-15
Payer: MEDICARE

## 2024-11-15 NOTE — TELEPHONE ENCOUNTER
Reached out to patient to schedule appointment from messages. Apt has been made.   Pt understand. All questions answered.     Stefano Barry Medical Assistant

## 2024-11-15 NOTE — TELEPHONE ENCOUNTER
----- Message from Jin sent at 11/15/2024  2:23 PM CST -----  Contact: patient  Type:   Apoointment Request    Name of Caller:Cassandra Campos   Would the patient rather a call back or a response via [facility]? Call   Best Call Back Number: [phone]   Additional Information:  pt would like a call back a call back in regards to rescheduling her upcoming appt. ( I was not able to reschedule her due to the provider schedule not populating).

## 2024-11-15 NOTE — TELEPHONE ENCOUNTER
----- Message from Pilar sent at 11/15/2024  1:26 PM CST -----  Contact: jeremiah  Type:  reschedule Apoointment Request      Name of Caller:jeremiah  When is the first available appointment?unknown  Symptoms:rescg  Would the patient rather a call back or a response via [facility]? Call back   Best Call Back Number:.[phone]  Additional Information:

## 2024-11-18 ENCOUNTER — TELEPHONE (OUTPATIENT)
Dept: INTERNAL MEDICINE | Facility: CLINIC | Age: 74
End: 2024-11-18
Payer: MEDICARE

## 2024-11-18 ENCOUNTER — PATIENT MESSAGE (OUTPATIENT)
Dept: INTERNAL MEDICINE | Facility: CLINIC | Age: 74
End: 2024-11-18
Payer: MEDICARE

## 2024-11-18 NOTE — TELEPHONE ENCOUNTER
----- Message from Nurse Mayo sent at 11/15/2024 11:04 AM CST -----  Contact: RICK SAMAYOA [[id number]]    ----- Message -----  From: Teresa Shirley  Sent: 11/15/2024  10:22 AM CST  To: Vicente GRANDE Staff    .Type:  Patient Requesting Call    Who Called: CHEVY, home health  Does the patient know what this is regarding?:chevy calling to check to see if pt has got Home health  Would the patient rather a call back or a response via [facility]? call  Best Call Back Number:[phone]  Additional Information:

## 2024-11-19 ENCOUNTER — OFFICE VISIT (OUTPATIENT)
Dept: INTERNAL MEDICINE | Facility: CLINIC | Age: 74
End: 2024-11-19
Payer: MEDICARE

## 2024-11-19 VITALS
BODY MASS INDEX: 25.5 KG/M2 | OXYGEN SATURATION: 98 % | HEART RATE: 86 BPM | DIASTOLIC BLOOD PRESSURE: 60 MMHG | RESPIRATION RATE: 20 BRPM | SYSTOLIC BLOOD PRESSURE: 108 MMHG | WEIGHT: 143.94 LBS | HEIGHT: 63 IN | TEMPERATURE: 96 F

## 2024-11-19 DIAGNOSIS — E11.9 CONTROLLED TYPE 2 DIABETES MELLITUS WITHOUT COMPLICATION, WITH LONG-TERM CURRENT USE OF INSULIN: ICD-10-CM

## 2024-11-19 DIAGNOSIS — Z79.4 CONTROLLED TYPE 2 DIABETES MELLITUS WITHOUT COMPLICATION, WITH LONG-TERM CURRENT USE OF INSULIN: ICD-10-CM

## 2024-11-19 DIAGNOSIS — I10 PRIMARY HYPERTENSION: ICD-10-CM

## 2024-11-19 DIAGNOSIS — R19.7 DIARRHEA, UNSPECIFIED TYPE: Primary | ICD-10-CM

## 2024-11-19 DIAGNOSIS — C34.32 MALIGNANT NEOPLASM OF LOWER LOBE OF LEFT LUNG: ICD-10-CM

## 2024-11-19 PROCEDURE — 1160F RVW MEDS BY RX/DR IN RCRD: CPT | Mod: CPTII,S$GLB,, | Performed by: PHYSICIAN ASSISTANT

## 2024-11-19 PROCEDURE — 99214 OFFICE O/P EST MOD 30 MIN: CPT | Mod: S$GLB,,, | Performed by: PHYSICIAN ASSISTANT

## 2024-11-19 PROCEDURE — 1159F MED LIST DOCD IN RCRD: CPT | Mod: CPTII,S$GLB,, | Performed by: PHYSICIAN ASSISTANT

## 2024-11-19 PROCEDURE — 3074F SYST BP LT 130 MM HG: CPT | Mod: CPTII,S$GLB,, | Performed by: PHYSICIAN ASSISTANT

## 2024-11-19 PROCEDURE — 3044F HG A1C LEVEL LT 7.0%: CPT | Mod: CPTII,S$GLB,, | Performed by: PHYSICIAN ASSISTANT

## 2024-11-19 PROCEDURE — 4010F ACE/ARB THERAPY RXD/TAKEN: CPT | Mod: CPTII,S$GLB,, | Performed by: PHYSICIAN ASSISTANT

## 2024-11-19 PROCEDURE — 1126F AMNT PAIN NOTED NONE PRSNT: CPT | Mod: CPTII,S$GLB,, | Performed by: PHYSICIAN ASSISTANT

## 2024-11-19 PROCEDURE — 1100F PTFALLS ASSESS-DOCD GE2>/YR: CPT | Mod: CPTII,S$GLB,, | Performed by: PHYSICIAN ASSISTANT

## 2024-11-19 PROCEDURE — 3060F POS MICROALBUMINURIA REV: CPT | Mod: CPTII,S$GLB,, | Performed by: PHYSICIAN ASSISTANT

## 2024-11-19 PROCEDURE — 3288F FALL RISK ASSESSMENT DOCD: CPT | Mod: CPTII,S$GLB,, | Performed by: PHYSICIAN ASSISTANT

## 2024-11-19 PROCEDURE — 3066F NEPHROPATHY DOC TX: CPT | Mod: CPTII,S$GLB,, | Performed by: PHYSICIAN ASSISTANT

## 2024-11-19 PROCEDURE — 99999 PR PBB SHADOW E&M-EST. PATIENT-LVL IV: CPT | Mod: PBBFAC,,, | Performed by: PHYSICIAN ASSISTANT

## 2024-11-19 PROCEDURE — 3078F DIAST BP <80 MM HG: CPT | Mod: CPTII,S$GLB,, | Performed by: PHYSICIAN ASSISTANT

## 2024-11-19 NOTE — PROGRESS NOTES
"Subjective:      Patient ID: Cassandra Campos is a 74 y.o. female.    Chief Complaint: Diarrhea    Patient is known to me, being seen today for diarrhea x4days.  Treatment includes otc anti-diarrheal   Ordered probiotic on [employer]   Denies sick contacts, denies antibiotic use, hospitalization, out of country travel  No diarrhea thus far today   Eating and drinking well, appetite is fine  Admits eats a lot of sugar free food which she read can sometimes cause diarrhea   Sugar is well controlled, 88 on last check      Last visit Nov 2024 w Lakshmi Vega NP.       Review of Systems   Constitutional:  Negative for chills, diaphoresis and fever.   HENT:  Negative for congestion, rhinorrhea and sore throat.    Respiratory:  Negative for cough, shortness of breath and wheezing.    Gastrointestinal:  Positive for diarrhea (2 episodes/day, loose, occasionally watery). Negative for abdominal pain, blood in stool, constipation, nausea and vomiting.   Skin:  Negative for rash.   Neurological:  Negative for dizziness, weakness, light-headedness and headaches.       Objective:   /60   Pulse 86   Temp 96.1 °F (35.6 °C) (Tympanic)   Resp 20   Ht 5' 3" (1.6 m)   Wt 65.3 kg (143 lb 15.4 oz)   SpO2 98%   BMI 25.50 kg/m²   Physical Exam  Constitutional:       General: She is not in acute distress.     Appearance: Normal appearance. She is well-developed. She is not ill-appearing.   HENT:      Head: Normocephalic and atraumatic.   Cardiovascular:      Rate and Rhythm: Normal rate and regular rhythm.      Heart sounds: Normal heart sounds. No murmur heard.  Pulmonary:      Effort: Pulmonary effort is normal. No respiratory distress.      Breath sounds: Normal breath sounds. No decreased breath sounds.   Abdominal:      General: Bowel sounds are normal.      Palpations: Abdomen is soft.      Tenderness: There is generalized abdominal tenderness (mild).   Musculoskeletal:      Right lower leg: No edema.      Left lower leg: " No edema.   Skin:     General: Skin is warm and dry.      Findings: No rash.   Psychiatric:         Speech: Speech normal.         Behavior: Behavior normal.         Thought Content: Thought content normal.       Assessment:      1. Diarrhea, unspecified type    2. Primary hypertension    3. Controlled type 2 diabetes mellitus without complication, with long-term current use of insulin    4. Malignant neoplasm of lower lobe of left lung       Plan:   Diarrhea, unspecified type    Primary hypertension    Controlled type 2 diabetes mellitus without complication, with long-term current use of insulin    Malignant neoplasm of lower lobe of left lung        Avoid excess sugar free products  Ensure adequate hydration and fiber   Begin probiotic   If symptoms worsen or persist >7days, to let us know and we will plan to do labs and stool studies     Discussed worsening signs/symptoms and when to return to clinic or go to ED.   Patient expresses understanding and agrees with treatment plan.

## 2024-11-23 ENCOUNTER — PATIENT MESSAGE (OUTPATIENT)
Dept: INTERNAL MEDICINE | Facility: CLINIC | Age: 74
End: 2024-11-23
Payer: MEDICARE

## 2024-11-26 ENCOUNTER — PATIENT MESSAGE (OUTPATIENT)
Dept: INTERNAL MEDICINE | Facility: CLINIC | Age: 74
End: 2024-11-26
Payer: MEDICARE

## 2024-11-26 ENCOUNTER — LAB VISIT (OUTPATIENT)
Dept: LAB | Facility: HOSPITAL | Age: 74
End: 2024-11-26
Attending: PHYSICIAN ASSISTANT
Payer: MEDICARE

## 2024-11-26 DIAGNOSIS — R19.7 DIARRHEA, UNSPECIFIED TYPE: ICD-10-CM

## 2024-11-26 LAB
ALBUMIN SERPL BCP-MCNC: 3.2 G/DL (ref 3.5–5.2)
ALP SERPL-CCNC: 99 U/L (ref 40–150)
ALT SERPL W/O P-5'-P-CCNC: 47 U/L (ref 10–44)
ANION GAP SERPL CALC-SCNC: 7 MMOL/L (ref 8–16)
AST SERPL-CCNC: 34 U/L (ref 10–40)
BASOPHILS # BLD AUTO: 0.02 K/UL (ref 0–0.2)
BASOPHILS NFR BLD: 0.4 % (ref 0–1.9)
BILIRUB SERPL-MCNC: 0.2 MG/DL (ref 0.1–1)
BUN SERPL-MCNC: 7 MG/DL (ref 8–23)
CALCIUM SERPL-MCNC: 9.5 MG/DL (ref 8.7–10.5)
CHLORIDE SERPL-SCNC: 106 MMOL/L (ref 95–110)
CO2 SERPL-SCNC: 28 MMOL/L (ref 23–29)
CREAT SERPL-MCNC: 0.6 MG/DL (ref 0.5–1.4)
DIFFERENTIAL METHOD BLD: ABNORMAL
EOSINOPHIL # BLD AUTO: 0.1 K/UL (ref 0–0.5)
EOSINOPHIL NFR BLD: 1.3 % (ref 0–8)
ERYTHROCYTE [DISTWIDTH] IN BLOOD BY AUTOMATED COUNT: 20.4 % (ref 11.5–14.5)
EST. GFR  (NO RACE VARIABLE): >60 ML/MIN/1.73 M^2
GLUCOSE SERPL-MCNC: 64 MG/DL (ref 70–110)
HCT VFR BLD AUTO: 40.8 % (ref 37–48.5)
HGB BLD-MCNC: 13.3 G/DL (ref 12–16)
IMM GRANULOCYTES # BLD AUTO: 0.01 K/UL (ref 0–0.04)
IMM GRANULOCYTES NFR BLD AUTO: 0.2 % (ref 0–0.5)
LYMPHOCYTES # BLD AUTO: 2.4 K/UL (ref 1–4.8)
LYMPHOCYTES NFR BLD: 45.3 % (ref 18–48)
MCH RBC QN AUTO: 23.2 PG (ref 27–31)
MCHC RBC AUTO-ENTMCNC: 32.6 G/DL (ref 32–36)
MCV RBC AUTO: 71 FL (ref 82–98)
MONOCYTES # BLD AUTO: 0.5 K/UL (ref 0.3–1)
MONOCYTES NFR BLD: 10.1 % (ref 4–15)
NEUTROPHILS # BLD AUTO: 2.2 K/UL (ref 1.8–7.7)
NEUTROPHILS NFR BLD: 42.7 % (ref 38–73)
NRBC BLD-RTO: 0 /100 WBC
PLATELET # BLD AUTO: 272 K/UL (ref 150–450)
PMV BLD AUTO: 9.3 FL (ref 9.2–12.9)
POTASSIUM SERPL-SCNC: 3.4 MMOL/L (ref 3.5–5.1)
PROT SERPL-MCNC: 7.5 G/DL (ref 6–8.4)
RBC # BLD AUTO: 5.73 M/UL (ref 4–5.4)
SODIUM SERPL-SCNC: 141 MMOL/L (ref 136–145)
WBC # BLD AUTO: 5.25 K/UL (ref 3.9–12.7)

## 2024-11-26 PROCEDURE — 80053 COMPREHEN METABOLIC PANEL: CPT | Performed by: PHYSICIAN ASSISTANT

## 2024-11-26 PROCEDURE — 36415 COLL VENOUS BLD VENIPUNCTURE: CPT | Performed by: PHYSICIAN ASSISTANT

## 2024-11-26 PROCEDURE — 85025 COMPLETE CBC W/AUTO DIFF WBC: CPT | Performed by: PHYSICIAN ASSISTANT

## 2024-11-29 ENCOUNTER — PATIENT MESSAGE (OUTPATIENT)
Dept: INTERNAL MEDICINE | Facility: CLINIC | Age: 74
End: 2024-11-29
Payer: MEDICARE

## 2024-11-29 DIAGNOSIS — R19.7 DIARRHEA, UNSPECIFIED TYPE: Primary | ICD-10-CM

## 2024-12-03 ENCOUNTER — PATIENT MESSAGE (OUTPATIENT)
Dept: INTERNAL MEDICINE | Facility: CLINIC | Age: 74
End: 2024-12-03
Payer: MEDICARE

## 2024-12-04 ENCOUNTER — PATIENT MESSAGE (OUTPATIENT)
Dept: SPORTS MEDICINE | Facility: CLINIC | Age: 74
End: 2024-12-04
Payer: MEDICARE

## 2024-12-06 ENCOUNTER — OFFICE VISIT (OUTPATIENT)
Dept: CARDIOLOGY | Facility: CLINIC | Age: 74
End: 2024-12-06
Payer: MEDICARE

## 2024-12-06 VITALS
SYSTOLIC BLOOD PRESSURE: 102 MMHG | WEIGHT: 145.75 LBS | BODY MASS INDEX: 25.81 KG/M2 | HEART RATE: 90 BPM | OXYGEN SATURATION: 98 % | DIASTOLIC BLOOD PRESSURE: 68 MMHG

## 2024-12-06 DIAGNOSIS — I47.10 PAROXYSMAL SVT (SUPRAVENTRICULAR TACHYCARDIA): ICD-10-CM

## 2024-12-06 DIAGNOSIS — I70.0 AORTIC ATHEROSCLEROSIS: Primary | ICD-10-CM

## 2024-12-06 DIAGNOSIS — Z79.4 CONTROLLED TYPE 2 DIABETES MELLITUS WITHOUT COMPLICATION, WITH LONG-TERM CURRENT USE OF INSULIN: ICD-10-CM

## 2024-12-06 DIAGNOSIS — I70.0 CALCIFICATION OF AORTA: ICD-10-CM

## 2024-12-06 DIAGNOSIS — E11.9 CONTROLLED TYPE 2 DIABETES MELLITUS WITHOUT COMPLICATION, WITH LONG-TERM CURRENT USE OF INSULIN: ICD-10-CM

## 2024-12-06 DIAGNOSIS — I10 PRIMARY HYPERTENSION: ICD-10-CM

## 2024-12-06 DIAGNOSIS — I48.3 TYPICAL ATRIAL FLUTTER: ICD-10-CM

## 2024-12-06 PROCEDURE — 3044F HG A1C LEVEL LT 7.0%: CPT | Mod: CPTII,S$GLB,, | Performed by: INTERNAL MEDICINE

## 2024-12-06 PROCEDURE — 3066F NEPHROPATHY DOC TX: CPT | Mod: CPTII,S$GLB,, | Performed by: INTERNAL MEDICINE

## 2024-12-06 PROCEDURE — 1126F AMNT PAIN NOTED NONE PRSNT: CPT | Mod: CPTII,S$GLB,, | Performed by: INTERNAL MEDICINE

## 2024-12-06 PROCEDURE — 3074F SYST BP LT 130 MM HG: CPT | Mod: CPTII,S$GLB,, | Performed by: INTERNAL MEDICINE

## 2024-12-06 PROCEDURE — 1159F MED LIST DOCD IN RCRD: CPT | Mod: CPTII,S$GLB,, | Performed by: INTERNAL MEDICINE

## 2024-12-06 PROCEDURE — 3288F FALL RISK ASSESSMENT DOCD: CPT | Mod: CPTII,S$GLB,, | Performed by: INTERNAL MEDICINE

## 2024-12-06 PROCEDURE — 3078F DIAST BP <80 MM HG: CPT | Mod: CPTII,S$GLB,, | Performed by: INTERNAL MEDICINE

## 2024-12-06 PROCEDURE — 3060F POS MICROALBUMINURIA REV: CPT | Mod: CPTII,S$GLB,, | Performed by: INTERNAL MEDICINE

## 2024-12-06 PROCEDURE — 1160F RVW MEDS BY RX/DR IN RCRD: CPT | Mod: CPTII,S$GLB,, | Performed by: INTERNAL MEDICINE

## 2024-12-06 PROCEDURE — 99999 PR PBB SHADOW E&M-EST. PATIENT-LVL IV: CPT | Mod: PBBFAC,,, | Performed by: INTERNAL MEDICINE

## 2024-12-06 PROCEDURE — 1101F PT FALLS ASSESS-DOCD LE1/YR: CPT | Mod: CPTII,S$GLB,, | Performed by: INTERNAL MEDICINE

## 2024-12-06 PROCEDURE — 4010F ACE/ARB THERAPY RXD/TAKEN: CPT | Mod: CPTII,S$GLB,, | Performed by: INTERNAL MEDICINE

## 2024-12-06 PROCEDURE — 99214 OFFICE O/P EST MOD 30 MIN: CPT | Mod: S$GLB,,, | Performed by: INTERNAL MEDICINE

## 2024-12-06 NOTE — PROGRESS NOTES
Subjective:   Patient ID:  Cassandra Campos is a 74 y.o. female who presents for follow up of Follow-up      72 yo, female, came in for 6 m f/u  PMH PSVT, pAFL post op, HTN, HLP, NIDDM 10 yrs, obesity, ex smoker, DANIEL, not on CPAP. H/o lung Ca s/p left robotic lingual lobectomy and mediastinal lymph node dissection for a high-grade adenocarcinoma in 05/24.     No h/o MI CVA  Had bariatric surgery done in . Uncomplicated and last 30 pounds.  Walks 2 miles daily.   No smoking and occasional drink  Lives alone and no limitation of exercise  Patient feels OK, no chest pain, no sob, no palpitation, no dizziness, no syncope, no CNS symptoms.    04/24 visit  Plan robotic lobectomy for lung ca. Quit smoking 20 yrs ago.  Walks at home. No chest pain dizziness orthopnea . On inhaler as needed  EKG reviewed by myself today reveals NSR nonspecific STT change sinus tachy and LVH.  No change compared to prior one in 2020.    06/24 visit  S/p Left robotic lingual lobectomy and mediastinal lymph node dissection for a high-grade adenocarcinoma on 05/10/24  Post OP developed afl. And had LAKESHIA and DCCV converted to SR  TODAY EKG NSR  Oncology eval pending  No palpitation dizziness and chest pain   No orthopnea   and BP C    Interval history  Repeat vitals in 08/24 and no AFL. d/c eliquis.  Occasional dizziness. No faint chest pain palpitation  No coffee and energy drinking   and BP C                                           Past Medical History:   Diagnosis Date    Arthritis     hands    Bilateral bunions     Borderline glaucoma     De Quervain's disease (radial styloid tenosynovitis)     Gastritis     upper GI 2/2017    Hydradenitis     Hyperlipidemia     Hypertension     Insomnia     Migraines 02/01/2000    Nasal septum perforation     Obesity     Pneumonia     Restrictive airway disease     Sleep apnea     SVT (supraventricular tachycardia) 09/2013    Trigger finger     Type 2 diabetes mellitus 2012      am 2024       Past Surgical History:   Procedure Laterality Date    AXILLARY HIDRADENITIS EXCISION Bilateral     BONE EXOSTOSIS EXCISION Right 2018    Procedure: EXCISION, EXOSTOSIS;  Surgeon: Jayro Pedraza Sr., MD;  Location: [facility];  Service: Orthopedics;  Laterality: Right;    BREAST BIOPSY Bilateral     both benign    BREAST SURGERY  1998    CARPAL TUNNEL RELEASE      bilateral    CARPAL TUNNEL RELEASE Right 2024    Procedure: RELEASE, CARPAL TUNNEL;  Surgeon: Jaime Oswald MD;  Location: [facility];  Service: Orthopedics;  Laterality: Right;    CARPAL TUNNEL RELEASE Left 2024    Procedure: RELEASE, CARPAL TUNNEL;  Surgeon: Jaime Oswald MD;  Location: [facility];  Service: Orthopedics;  Laterality: Left;    CATARACT EXTRACTION Bilateral     OU     SECTION  1979    CHOLECYSTECTOMY  2014    COLONOSCOPY N/A 10/02/2020    Procedure: COLONOSCOPY;  Surgeon: Tushar Edwards MD;  Location: [facility];  Service: Endoscopy;  Laterality: N/A;    COLONOSCOPY W/ POLYPECTOMY  10/02/2020    Polyps x3, repeat 5 years; Tushar Edwards MD     CYST REMOVAL  2015    sebaceous cyst removed from face    DE QUERVAIN'S RELEASE Left 2020    Procedure: RELEASE, HAND, FOR DEQUERVAIN'S TENOSYNOVITIS;  Surgeon: Jaime Oswald MD;  Location: [facility];  Service: Orthopedics;  Laterality: Left;    DE QUERVAIN'S RELEASE Right 2020    Procedure: RELEASE, HAND, FOR DEQUERVAIN'S TENOSYNOVITIS;  Surgeon: Jaime Oswald MD;  Location: [facility];  Service: Orthopedics;  Laterality: Right;    ENDOBRONCHIAL ULTRASOUND Bilateral 04/10/2024    Procedure: ENDOBRONCHIAL ULTRASOUND (EBUS);  Surgeon: Adrian Robin MD;  Location: [facility];  Service: Pulmonary;  Laterality: Bilateral;    EYE SURGERY      gastric sleeve  2017    Dr. Watson    INJECTION OF ANESTHETIC AGENT AROUND MULTIPLE INTERCOSTAL NERVES  5/10/2024    Procedure: BLOCK, NERVE, INTERCOSTAL, 2 OR MORE;   Surgeon: Mike Nuñez MD;  Location: [facility] [place] [facility];  Service: Cardiothoracic;;    KNEE SURGERY Right     OLECRANON BURSECTOMY Right 2018    Procedure: BURSECTOMY, OLECRANON;  Surgeon: Jayro Pedraza Sr., MD;  Location: [facility];  Service: Orthopedics;  Laterality: Right;    SURGICAL REMOVAL OF BUNION WITH OSTEOTOMY OF METATARSAL BONE Left 05/10/2019    Procedure: BUNIONECTOMY, WITH METATARSAL OSTEOTOMY;  Surgeon: Srinivasan Villanueva DPM;  Location: [facility] [place] [facility];  Service: Podiatry;  Laterality: Left;    SURGICAL REMOVAL OF BUNION WITH OSTEOTOMY OF METATARSAL BONE Right 2019    Procedure: BUNIONECTOMY, WITH METATARSAL OSTEOTOMY;  Surgeon: Srinivasan Villanueva DPM;  Location: [facility] [place] [facility];  Service: Podiatry;  Laterality: Right;    SURGICAL REMOVAL OF LYMPH NODE Left 5/10/2024    Procedure: EXCISION, LYMPH NODE;  Surgeon: Mike Nuñez MD;  Location: [facility] [place] [facility];  Service: Cardiothoracic;  Laterality: Left;    TONSILLECTOMY, ADENOIDECTOMY  1980s    TRANSESOPHAGEAL ECHOCARDIOGRAM WITH POSSIBLE CARDIOVERSION (LAKESHIA W/ POSS CARDIOVERSION) N/A 5/15/2024    Procedure: Transesophageal echo (LAKESHIA) intra-procedure log documentation;  Surgeon: Twan Pelaez MD;  Location: [facility] [place] [facility];  Service: Cardiology;  Laterality: N/A;    TRIGGER FINGER RELEASE Right 2015    Dr. Milo HALL ROBOTIC RATS,WITH LOBECTOMY,LUNG Left 5/10/2024    Procedure: XI ROBOTIC RATS,WITH LOBECTOMY,LUNG;  Surgeon: Mike Nuñez MD;  Location: [facility] [place] [facility];  Service: Cardiothoracic;  Laterality: Left;  Lingulectomy       Social History     Tobacco Use    Smoking status: Former     Current packs/day: 0.00     Average packs/day: 0.5 packs/day for 42.0 years (21.0 ttl pk-yrs)     Types: Cigarettes     Start date: 1970     Quit date: 2012     Years since quittin.9     Passive exposure: Past    Smokeless tobacco: Never   Substance Use Topics    Alcohol use: Not Currently     Alcohol/week: 1.0 standard drink of alcohol     Types: 1 Glasses of wine per  week     Comment: Glass red wine once a every 2 weeks    Drug use: Never       Family History   Problem Relation Name Age of Onset    Prostate cancer Brother      Diabetes Maternal Aunt Alondra Campos     Diabetes Cousin      Hypertension Maternal Grandmother Portia TSAI    Objective:   Physical Exam  HENT:      Head: Normocephalic.   Eyes:      Pupils: Pupils are equal, round, and reactive to light.   Neck:      Thyroid: No thyromegaly.      Vascular: Normal carotid pulses. No carotid bruit or JVD.   Cardiovascular:      Rate and Rhythm: Normal rate and regular rhythm. No extrasystoles are present.     Chest Wall: PMI is not displaced.      Pulses: Normal pulses.      Heart sounds: Normal heart sounds. No murmur heard.     No gallop. No S3 sounds.   Pulmonary:      Effort: No respiratory distress.      Breath sounds: Normal breath sounds. No stridor.   Abdominal:      General: Bowel sounds are normal.      Palpations: Abdomen is soft.      Tenderness: There is no abdominal tenderness. There is no rebound.   Skin:     Findings: No rash.   Neurological:      Mental Status: She is alert and oriented to person, place, and time.   Psychiatric:         Behavior: Behavior normal.         Lab Results   Component Value Date    CHOL 184 12/26/2023    CHOL 190 09/12/2022    CHOL 208 (H) 04/13/2022     Lab Results   Component Value Date    HDL 48 12/26/2023    HDL 39 (L) 09/12/2022    HDL 37 (L) 04/13/2022     Lab Results   Component Value Date    LDLCALC 110.4 12/26/2023    LDLCALC 114.6 09/12/2022    LDLCALC 129.0 04/13/2022     Lab Results   Component Value Date    TRIG 128 12/26/2023    TRIG 182 (H) 09/12/2022    TRIG 210 (H) 04/13/2022     Lab Results   Component Value Date    CHOLHDL 26.1 12/26/2023    CHOLHDL 20.5 09/12/2022    CHOLHDL 17.8 (L) 04/13/2022       Chemistry        Component Value Date/Time     11/26/2024 1203    K 3.4 (L) 11/26/2024 1203     11/26/2024 1203    CO2 28 11/26/2024 1203     BUN 7 (L) 11/26/2024 1203    CREATININE 0.6 11/26/2024 1203    GLU 64 (L) 11/26/2024 1203        Component Value Date/Time    CALCIUM 9.5 11/26/2024 1203    ALKPHOS 99 11/26/2024 1203    AST 34 11/26/2024 1203    ALT 47 (H) 11/26/2024 1203    BILITOT 0.2 11/26/2024 1203    ESTGFRAFRICA >60 06/21/2022 0935    EGFRNONAA >60 06/21/2022 0935          Lab Results   Component Value Date    HGBA1C 5.7 (H) 08/28/2024     Lab Results   Component Value Date    TSH 3.220 09/12/2022     Lab Results   Component Value Date    INR 1.1 05/14/2024    INR 0.9 04/04/2024    INR 1.0 06/21/2022     Lab Results   Component Value Date    WBC 5.25 11/26/2024    HGB 13.3 11/26/2024    HCT 40.8 11/26/2024    MCV 71 (L) 11/26/2024     11/26/2024     BMP  Sodium   Date Value Ref Range Status   11/26/2024 141 136 - 145 mmol/L Final     Potassium   Date Value Ref Range Status   11/26/2024 3.4 (L) 3.5 - 5.1 mmol/L Final     Chloride   Date Value Ref Range Status   11/26/2024 106 95 - 110 mmol/L Final     CO2   Date Value Ref Range Status   11/26/2024 28 23 - 29 mmol/L Final     BUN   Date Value Ref Range Status   11/26/2024 7 (L) 8 - 23 mg/dL Final     Creatinine   Date Value Ref Range Status   11/26/2024 0.6 0.5 - 1.4 mg/dL Final     Calcium   Date Value Ref Range Status   11/26/2024 9.5 8.7 - 10.5 mg/dL Final     Anion Gap   Date Value Ref Range Status   11/26/2024 7 (L) 8 - 16 mmol/L Final     eGFR if    Date Value Ref Range Status   06/21/2022 >60 >60 mL/min/1.73 m^2 Final     eGFR if non    Date Value Ref Range Status   06/21/2022 >60 >60 mL/min/1.73 m^2 Final     Comment:     Calculation used to obtain the estimated glomerular filtration  rate (eGFR) is the CKD-EPI equation.        BNP  @LABRCNTIP(BNP,BNPTRIAGEBLO)@  @LABRCNTIP(troponini)@  CrCl cannot be calculated (Patient's most recent lab result is older than the maximum 7 days allowed.).  No results found in the last 24 hours.  No results found  in the last 24 hours.  No results found in the last 24 hours.    Assessment:      1. Aortic atherosclerosis    2. Calcification of aorta    3. Primary hypertension    4. Typical atrial flutter    5. Paroxysmal SVT (supraventricular tachycardia)    6. Controlled type 2 diabetes mellitus without complication, with long-term current use of insulin      POST OP AFL. No recurrent  Already held eliquis    Plan:     Add ASA 81mg daily  Continue Losartan and metoprolol  RTC in 6m

## 2024-12-06 NOTE — TELEPHONE ENCOUNTER
Met with Curtis during medical oncology appointment today. He had thoracentesis today with little yield because of lobulation. Dr. Wing reviewed holding immunotherapy today. He will confer with pulmonology for best plan of care.     He was planning on driving to [place] tomorrow. He is going to hold off on laving until plan of care established. He has an appointment with oncology in [place] scheduled 12/16/24.     Refill Routing Note   Medication(s) are not appropriate for processing by [facility] for the following reason(s):        Required labs outdated    [facility] action(s):  Defer               Appointments  past 12m or future 3m with PCP    Date Provider   Last Visit   7/26/2023 Emil Zhang MD   Next Visit   12/26/2023 Emil Zhang MD   ED visits in past 90 days: 0        Note composed:6:37 PM 11/21/2023

## 2024-12-10 ENCOUNTER — PATIENT MESSAGE (OUTPATIENT)
Dept: INTERNAL MEDICINE | Facility: CLINIC | Age: 74
End: 2024-12-10
Payer: MEDICARE

## 2024-12-11 ENCOUNTER — HOSPITAL ENCOUNTER (EMERGENCY)
Facility: HOSPITAL | Age: 74
Discharge: HOME | End: 2024-12-11
Attending: EMERGENCY MEDICINE
Payer: MEDICARE

## 2024-12-11 ENCOUNTER — PATIENT MESSAGE (OUTPATIENT)
Dept: INTERNAL MEDICINE | Facility: CLINIC | Age: 74
End: 2024-12-11
Payer: MEDICARE

## 2024-12-11 ENCOUNTER — PATIENT MESSAGE (OUTPATIENT)
Dept: CARDIOTHORACIC SURGERY | Facility: CLINIC | Age: 74
End: 2024-12-11
Payer: MEDICARE

## 2024-12-11 ENCOUNTER — TELEPHONE (OUTPATIENT)
Dept: CARDIOTHORACIC SURGERY | Facility: CLINIC | Age: 74
End: 2024-12-11
Payer: MEDICARE

## 2024-12-11 VITALS
HEART RATE: 91 BPM | OXYGEN SATURATION: 96 % | DIASTOLIC BLOOD PRESSURE: 79 MMHG | BODY MASS INDEX: 25.69 KG/M2 | SYSTOLIC BLOOD PRESSURE: 135 MMHG | WEIGHT: 145 LBS | HEIGHT: 63 IN | RESPIRATION RATE: 20 BRPM | TEMPERATURE: 99 F

## 2024-12-11 DIAGNOSIS — C34.32 MALIGNANT NEOPLASM OF LOWER LOBE OF LEFT LUNG: Primary | ICD-10-CM

## 2024-12-11 DIAGNOSIS — R93.5 ABNORMAL CT OF THE ABDOMEN: ICD-10-CM

## 2024-12-11 DIAGNOSIS — R19.7 DIARRHEA, UNSPECIFIED TYPE: Primary | ICD-10-CM

## 2024-12-11 LAB
ALBUMIN SERPL BCP-MCNC: 2.9 G/DL (ref 3.5–5.2)
ALP SERPL-CCNC: 96 U/L (ref 40–150)
ALT SERPL W/O P-5'-P-CCNC: 19 U/L (ref 10–44)
ANION GAP SERPL CALC-SCNC: 10 MMOL/L (ref 8–16)
AST SERPL-CCNC: 14 U/L (ref 10–40)
BASOPHILS # BLD AUTO: 0.02 K/UL (ref 0–0.2)
BASOPHILS NFR BLD: 0.4 % (ref 0–1.9)
BILIRUB SERPL-MCNC: 0.3 MG/DL (ref 0.1–1)
BILIRUB UR QL STRIP: NEGATIVE
BUN SERPL-MCNC: 5 MG/DL (ref 8–23)
CALCIUM SERPL-MCNC: 9.4 MG/DL (ref 8.7–10.5)
CHLORIDE SERPL-SCNC: 106 MMOL/L (ref 95–110)
CLARITY UR: CLEAR
CO2 SERPL-SCNC: 22 MMOL/L (ref 23–29)
COLOR UR: YELLOW
CREAT SERPL-MCNC: 0.6 MG/DL (ref 0.5–1.4)
DIFFERENTIAL METHOD BLD: ABNORMAL
EOSINOPHIL # BLD AUTO: 0 K/UL (ref 0–0.5)
EOSINOPHIL NFR BLD: 0.4 % (ref 0–8)
ERYTHROCYTE [DISTWIDTH] IN BLOOD BY AUTOMATED COUNT: 19.4 % (ref 11.5–14.5)
EST. GFR  (NO RACE VARIABLE): >60 ML/MIN/1.73 M^2
GLUCOSE SERPL-MCNC: 85 MG/DL (ref 70–110)
GLUCOSE UR QL STRIP: NEGATIVE
HCT VFR BLD AUTO: 38.5 % (ref 37–48.5)
HCV AB SERPL QL IA: NEGATIVE
HEP C VIRUS HOLD SPECIMEN: NORMAL
HGB BLD-MCNC: 12.6 G/DL (ref 12–16)
HGB UR QL STRIP: NEGATIVE
HIV 1+2 AB+HIV1 P24 AG SERPL QL IA: NEGATIVE
IMM GRANULOCYTES # BLD AUTO: 0.01 K/UL (ref 0–0.04)
IMM GRANULOCYTES NFR BLD AUTO: 0.2 % (ref 0–0.5)
INR PPP: 1 (ref 0.8–1.2)
KETONES UR QL STRIP: NEGATIVE
LEUKOCYTE ESTERASE UR QL STRIP: NEGATIVE
LIPASE SERPL-CCNC: 30 U/L (ref 4–60)
LYMPHOCYTES # BLD AUTO: 2 K/UL (ref 1–4.8)
LYMPHOCYTES NFR BLD: 37.4 % (ref 18–48)
MAGNESIUM SERPL-MCNC: 1.8 MG/DL (ref 1.6–2.6)
MCH RBC QN AUTO: 23.1 PG (ref 27–31)
MCHC RBC AUTO-ENTMCNC: 32.7 G/DL (ref 32–36)
MCV RBC AUTO: 71 FL (ref 82–98)
MONOCYTES # BLD AUTO: 0.7 K/UL (ref 0.3–1)
MONOCYTES NFR BLD: 13.4 % (ref 4–15)
NEUTROPHILS # BLD AUTO: 2.5 K/UL (ref 1.8–7.7)
NEUTROPHILS NFR BLD: 48.2 % (ref 38–73)
NITRITE UR QL STRIP: NEGATIVE
NRBC BLD-RTO: 0 /100 WBC
PH UR STRIP: 7 [PH] (ref 5–8)
PLATELET # BLD AUTO: 262 K/UL (ref 150–450)
PMV BLD AUTO: 9 FL (ref 9.2–12.9)
POTASSIUM SERPL-SCNC: 3.7 MMOL/L (ref 3.5–5.1)
PROT SERPL-MCNC: 7.5 G/DL (ref 6–8.4)
PROT UR QL STRIP: NEGATIVE
PROTHROMBIN TIME: 11.1 SEC (ref 9–12.5)
RBC # BLD AUTO: 5.46 M/UL (ref 4–5.4)
SODIUM SERPL-SCNC: 138 MMOL/L (ref 136–145)
SP GR UR STRIP: 1.02 (ref 1–1.03)
URN SPEC COLLECT METH UR: NORMAL
UROBILINOGEN UR STRIP-ACNC: NEGATIVE EU/DL
WBC # BLD AUTO: 5.21 K/UL (ref 3.9–12.7)

## 2024-12-11 PROCEDURE — 83690 ASSAY OF LIPASE: CPT | Performed by: EMERGENCY MEDICINE

## 2024-12-11 PROCEDURE — 25500020 PHARM REV CODE 255: Performed by: EMERGENCY MEDICINE

## 2024-12-11 PROCEDURE — 87389 HIV-1 AG W/HIV-1&-2 AB AG IA: CPT | Performed by: EMERGENCY MEDICINE

## 2024-12-11 PROCEDURE — 96360 HYDRATION IV INFUSION INIT: CPT

## 2024-12-11 PROCEDURE — 85025 COMPLETE CBC W/AUTO DIFF WBC: CPT | Performed by: EMERGENCY MEDICINE

## 2024-12-11 PROCEDURE — 85610 PROTHROMBIN TIME: CPT | Performed by: EMERGENCY MEDICINE

## 2024-12-11 PROCEDURE — 96361 HYDRATE IV INFUSION ADD-ON: CPT

## 2024-12-11 PROCEDURE — 80053 COMPREHEN METABOLIC PANEL: CPT | Performed by: EMERGENCY MEDICINE

## 2024-12-11 PROCEDURE — 25000003 PHARM REV CODE 250: Performed by: EMERGENCY MEDICINE

## 2024-12-11 PROCEDURE — 86803 HEPATITIS C AB TEST: CPT | Performed by: EMERGENCY MEDICINE

## 2024-12-11 PROCEDURE — 99285 EMERGENCY DEPT VISIT HI MDM: CPT | Mod: 25

## 2024-12-11 PROCEDURE — 81003 URINALYSIS AUTO W/O SCOPE: CPT | Performed by: EMERGENCY MEDICINE

## 2024-12-11 PROCEDURE — 83735 ASSAY OF MAGNESIUM: CPT | Performed by: EMERGENCY MEDICINE

## 2024-12-11 RX ADMIN — IOHEXOL 100 ML: 350 INJECTION, SOLUTION INTRAVENOUS at 11:12

## 2024-12-11 RX ADMIN — SODIUM CHLORIDE 1000 ML: 9 INJECTION, SOLUTION INTRAVENOUS at 09:12

## 2024-12-11 NOTE — ED PROVIDER NOTES
SCRIBE #1 NOTE: I, Kam Martinez, am scribing for, and in the presence of, Francesco Jones DO. I have scribed the entire note.       History     Chief Complaint   Patient presents with    Fatigue     Pt c/o diarrhea since November,      Review of patient's allergies indicates:  No Known Allergies      History of Present Illness     HPI    12/11/2024, 9:09 AM  History obtained from the patient      History of Present Illness: Cassandra Campos is a 74 y.o. female patient with a PMHx of arthritis, glaucoma, DMII, SVT, sleep apnea, HTN, obesity, and HLD who presents to the Emergency Department for evaluation of fatigue which onset gradually over the last month. Pt has experienced diarrhea since November. A specimen collected 11/26 showed no signs of infection. Pt has been unable to see her gastroenterologist until February. Symptoms are constant and moderate in severity. No mitigating or exacerbating factors reported. Associated sxs include weakness, generalized myalgia, and groin pain. Patient denies any abdominal pain, vomiting, nausea, chills, sore throat, and all other sxs at this time. No prior tx reported. No further complaints or concerns at this time.       Arrival mode: [facility]    PCP: Emil Zhang MD        Past Medical History:  Past Medical History:   Diagnosis Date    Arthritis     hands    Bilateral bunions     Borderline glaucoma     De Quervain's disease (radial styloid tenosynovitis)     Gastritis     upper GI 2/2017    Hydradenitis     Hyperlipidemia     Hypertension     Insomnia     Migraines 02/01/2000    Nasal septum perforation     Obesity     Pneumonia     Restrictive airway disease     Sleep apnea     SVT (supraventricular tachycardia) 09/2013    Trigger finger     Type 2 diabetes mellitus 2012     am 02/01/2024       Past Surgical History:  Past Surgical History:   Procedure Laterality Date    AXILLARY HIDRADENITIS EXCISION Bilateral     BONE EXOSTOSIS EXCISION Right  2018    Procedure: EXCISION, EXOSTOSIS;  Surgeon: Jayro Pedraza Sr., MD;  Location: [facility];  Service: Orthopedics;  Laterality: Right;    BREAST BIOPSY Bilateral     both benign    BREAST SURGERY  1998    CARPAL TUNNEL RELEASE      bilateral    CARPAL TUNNEL RELEASE Right 2024    Procedure: RELEASE, CARPAL TUNNEL;  Surgeon: Jaime Oswald MD;  Location: [facility];  Service: Orthopedics;  Laterality: Right;    CARPAL TUNNEL RELEASE Left 2024    Procedure: RELEASE, CARPAL TUNNEL;  Surgeon: Jaime Oswald MD;  Location: [facility];  Service: Orthopedics;  Laterality: Left;    CATARACT EXTRACTION Bilateral     OU     SECTION  1979    CHOLECYSTECTOMY  2014    COLONOSCOPY N/A 10/02/2020    Procedure: COLONOSCOPY;  Surgeon: Tushar Edwards MD;  Location: [facility];  Service: Endoscopy;  Laterality: N/A;    COLONOSCOPY W/ POLYPECTOMY  10/02/2020    Polyps x3, repeat 5 years; Tushar Edwards MD     CYST REMOVAL  2015    sebaceous cyst removed from face    DE QUERVAIN'S RELEASE Left 2020    Procedure: RELEASE, HAND, FOR DEQUERVAIN'S TENOSYNOVITIS;  Surgeon: Jaime Oswald MD;  Location: [facility] OR;  Service: Orthopedics;  Laterality: Left;    DE QUERVAIN'S RELEASE Right 2020    Procedure: RELEASE, HAND, FOR DEQUERVAIN'S TENOSYNOVITIS;  Surgeon: Jaime Oswald MD;  Location: [facility];  Service: Orthopedics;  Laterality: Right;    ENDOBRONCHIAL ULTRASOUND Bilateral 04/10/2024    Procedure: ENDOBRONCHIAL ULTRASOUND (EBUS);  Surgeon: Adrian Robin MD;  Location: [facility];  Service: Pulmonary;  Laterality: Bilateral;    EYE SURGERY      gastric sleeve  2017    Dr. Watson    INJECTION OF ANESTHETIC AGENT AROUND MULTIPLE INTERCOSTAL NERVES  5/10/2024    Procedure: BLOCK, NERVE, INTERCOSTAL, 2 OR MORE;  Surgeon: Mike Nuñez MD;  Location: [facility];  Service: Cardiothoracic;;    KNEE SURGERY Right     OLECRANON BURSECTOMY Right 2018     Procedure: BURSECTOMY, OLECRANON;  Surgeon: Jayro Pedraza Sr., MD;  Location: [facility] OR;  Service: Orthopedics;  Laterality: Right;    SURGICAL REMOVAL OF BUNION WITH OSTEOTOMY OF METATARSAL BONE Left 05/10/2019    Procedure: BUNIONECTOMY, WITH METATARSAL OSTEOTOMY;  Surgeon: Srinivasan Villanueva DPM;  Location: [facility] OR;  Service: Podiatry;  Laterality: Left;    SURGICAL REMOVAL OF BUNION WITH OSTEOTOMY OF METATARSAL BONE Right 2019    Procedure: BUNIONECTOMY, WITH METATARSAL OSTEOTOMY;  Surgeon: Srinivasan Villanueva DPM;  Location: [facility] OR;  Service: Podiatry;  Laterality: Right;    SURGICAL REMOVAL OF LYMPH NODE Left 5/10/2024    Procedure: EXCISION, LYMPH NODE;  Surgeon: Mike Nuñez MD;  Location: [facility] OR;  Service: Cardiothoracic;  Laterality: Left;    TONSILLECTOMY, ADENOIDECTOMY  1980s    TRANSESOPHAGEAL ECHOCARDIOGRAM WITH POSSIBLE CARDIOVERSION (LAKESHIA W/ POSS CARDIOVERSION) N/A 5/15/2024    Procedure: Transesophageal echo (LAKESHIA) intra-procedure log documentation;  Surgeon: Twan Pelaez MD;  Location: [facility] CATH LAB;  Service: Cardiology;  Laterality: N/A;    TRIGGER FINGER RELEASE Right 2015    Dr. Milo HALL ROBOTIC RATS,WITH LOBECTOMY,LUNG Left 5/10/2024    Procedure: XI ROBOTIC RATS,WITH LOBECTOMY,LUNG;  Surgeon: Mike Nuñez MD;  Location: [facility] OR;  Service: Cardiothoracic;  Laterality: Left;  Lingulectomy         Family History:  Family History   Problem Relation Name Age of Onset    Prostate cancer Brother      Diabetes Maternal Aunt Alondra Campos     Diabetes Cousin      Hypertension Maternal Grandmother Portia Orosco        Social History:  Social History     Tobacco Use    Smoking status: Former     Current packs/day: 0.00     Average packs/day: 0.5 packs/day for 42.0 years (21.0 ttl pk-yrs)     Types: Cigarettes     Start date: 1970     Quit date: 2012     Years since quittin.9     Passive exposure: Past    Smokeless tobacco: Never   Substance and Sexual Activity    Alcohol  use: Not Currently     Alcohol/week: 1.0 standard drink of alcohol     Types: 1 Glasses of wine per week     Comment: Glass red wine once a every 2 weeks    Drug use: Never    Sexual activity: Not Currently     Partners: Female     Birth control/protection: Abstinence, None        Review of Systems     Review of Systems   Constitutional:  Positive for fatigue. Negative for chills and fever.   HENT:  Negative for sore throat.    Respiratory:  Negative for shortness of breath.    Cardiovascular:  Negative for chest pain.   Gastrointestinal:  Positive for diarrhea. Negative for nausea and vomiting.   Genitourinary:  Positive for pelvic pain. Negative for dysuria.   Musculoskeletal:  Negative for back pain.   Skin:  Negative for rash.   Neurological:  Negative for weakness.   Hematological:  Does not bruise/bleed easily.   All other systems reviewed and are negative.       Physical Exam     Initial Vitals [12/11/24 0848]   BP Pulse Resp Temp SpO2   115/78 92 18 99.2 °F (37.3 °C) 96 %      MAP       --          Physical Exam  Vitals reviewed.   Constitutional:       Appearance: Normal appearance.   Cardiovascular:      Rate and Rhythm: Normal rate and regular rhythm.      Heart sounds: No murmur heard.  Pulmonary:      Effort: Pulmonary effort is normal.      Breath sounds: No wheezing.   Abdominal:      General: There is no distension.      Palpations: Abdomen is soft.      Tenderness: There is no abdominal tenderness. There is no guarding.   Musculoskeletal:         General: Normal range of motion.   Skin:     General: Skin is warm and dry.      Findings: No rash.   Neurological:      General: No focal deficit present.      Mental Status: She is alert and oriented to person, place, and time.            ED Course   Procedures  ED Vital Signs:  Vitals:    12/11/24 0848 12/11/24 1030 12/11/24 1100 12/11/24 1300   BP: 115/78 138/78 132/75 135/79   Pulse: 92 90 88 91   Resp: 18 17 16 20   Temp: 99.2 °F (37.3 °C)     "  [facility]: Oral      SpO2: 96% 95% 96% 96%   Weight: 65.8 kg (145 lb)      Height: 5' 3" (1.6 m)          Abnormal Lab Results:  Labs Reviewed   CBC W/ AUTO DIFFERENTIAL - Abnormal       Result Value    WBC 5.21      RBC 5.46 (*)     Hemoglobin 12.6      Hematocrit 38.5      MCV 71 (*)     MCH 23.1 (*)     MCHC 32.7      RDW 19.4 (*)     Platelets 262      MPV 9.0 (*)     Immature Granulocytes 0.2      Gran # (ANC) 2.5      Immature Grans (Abs) 0.01      Lymph # 2.0      Mono # 0.7      Eos # 0.0      Baso # 0.02      nRBC 0      Gran % 48.2      Lymph % 37.4      Mono % 13.4      Eosinophil % 0.4      Basophil % 0.4      Differential Method Automated      Narrative:     Release to patient->Immediate   COMPREHENSIVE METABOLIC PANEL - Abnormal    Sodium 138      Potassium 3.7      Chloride 106      CO2 22 (*)     Glucose 85      BUN 5 (*)     Creatinine 0.6      Calcium 9.4      Total Protein 7.5      Albumin 2.9 (*)     Total Bilirubin 0.3      Alkaline Phosphatase 96      AST 14      ALT 19      eGFR >60      Anion Gap 10      Narrative:     Release to patient->Immediate   HEPATITIS C ANTIBODY    Hepatitis C Ab Negative      Narrative:     Release to patient->Immediate   HEP C VIRUS HOLD SPECIMEN    HEP C Virus Hold Specimen Hold for HCV sendout      Narrative:     Release to patient->Immediate   HIV 1 / 2 ANTIBODY    HIV 1/2 Ag/Ab Negative      Narrative:     Release to patient->Immediate   LIPASE    Lipase 30      Narrative:     Release to patient->Immediate   MAGNESIUM    Magnesium 1.8      Narrative:     Release to patient->Immediate   PROTIME-INR    Prothrombin Time 11.1      INR 1.0      Narrative:     Release to patient->Immediate   URINALYSIS, REFLEX TO URINE CULTURE    Specimen UA Urine, Clean Catch      Color, UA Yellow      Appearance, UA Clear      pH, UA 7.0      Specific Gravity, UA 1.020      Protein, UA Negative      Glucose, UA Negative      Ketones, UA Negative      Bilirubin (UA) Negative      " Occult Blood UA Negative      Nitrite, UA Negative      Urobilinogen, UA Negative      Leukocytes, UA Negative      Narrative:     Specimen Source->Urine        All Lab Results:  Results for orders placed or performed during the hospital encounter of 12/11/24   Hepatitis C Antibody    Collection Time: 12/11/24  9:19 AM   Result Value Ref Range    Hepatitis C Ab Negative Negative   HCV Virus Hold Specimen    Collection Time: 12/11/24  9:19 AM   Result Value Ref Range    HEP C Virus Hold Specimen Hold for HCV sendout    HIV 1/2 Ag/Ab (4th Gen)    Collection Time: 12/11/24  9:19 AM   Result Value Ref Range    HIV 1/2 Ag/Ab Negative Negative   CBC auto differential    Collection Time: 12/11/24  9:19 AM   Result Value Ref Range    WBC 5.21 3.90 - 12.70 K/uL    RBC 5.46 (H) 4.00 - 5.40 M/uL    Hemoglobin 12.6 12.0 - 16.0 g/dL    Hematocrit 38.5 37.0 - 48.5 %    MCV 71 (L) 82 - 98 fL    MCH 23.1 (L) 27.0 - 31.0 pg    MCHC 32.7 32.0 - 36.0 g/dL    RDW 19.4 (H) 11.5 - 14.5 %    Platelets 262 150 - 450 K/uL    MPV 9.0 (L) 9.2 - 12.9 fL    Immature Granulocytes 0.2 0.0 - 0.5 %    Gran # (ANC) 2.5 1.8 - 7.7 K/uL    Immature Grans (Abs) 0.01 0.00 - 0.04 K/uL    Lymph # 2.0 1.0 - 4.8 K/uL    Mono # 0.7 0.3 - 1.0 K/uL    Eos # 0.0 0.0 - 0.5 K/uL    Baso # 0.02 0.00 - 0.20 K/uL    nRBC 0 0 /100 WBC    Gran % 48.2 38.0 - 73.0 %    Lymph % 37.4 18.0 - 48.0 %    Mono % 13.4 4.0 - 15.0 %    Eosinophil % 0.4 0.0 - 8.0 %    Basophil % 0.4 0.0 - 1.9 %    Differential Method Automated    Comprehensive metabolic panel    Collection Time: 12/11/24  9:19 AM   Result Value Ref Range    Sodium 138 136 - 145 mmol/L    Potassium 3.7 3.5 - 5.1 mmol/L    Chloride 106 95 - 110 mmol/L    CO2 22 (L) 23 - 29 mmol/L    Glucose 85 70 - 110 mg/dL    BUN 5 (L) 8 - 23 mg/dL    Creatinine 0.6 0.5 - 1.4 mg/dL    Calcium 9.4 8.7 - 10.5 mg/dL    Total Protein 7.5 6.0 - 8.4 g/dL    Albumin 2.9 (L) 3.5 - 5.2 g/dL    Total Bilirubin 0.3 0.1 - 1.0 mg/dL    Alkaline  Phosphatase 96 40 - 150 U/L    AST 14 10 - 40 U/L    ALT 19 10 - 44 U/L    eGFR >60 >60 mL/min/1.73 m^2    Anion Gap 10 8 - 16 mmol/L   Lipase    Collection Time: 12/11/24  9:19 AM   Result Value Ref Range    Lipase 30 4 - 60 U/L   Magnesium    Collection Time: 12/11/24  9:19 AM   Result Value Ref Range    Magnesium 1.8 1.6 - 2.6 mg/dL   Protime-INR    Collection Time: 12/11/24  9:19 AM   Result Value Ref Range    Prothrombin Time 11.1 9.0 - 12.5 sec    INR 1.0 0.8 - 1.2   Urinalysis, Reflex to Urine Culture Urine, Clean Catch    Collection Time: 12/11/24  9:37 AM    Specimen: Urine, Clean Catch   Result Value Ref Range    Specimen UA Urine, Clean Catch     Color, UA Yellow Yellow, Straw, Yoon    Appearance, UA Clear Clear    pH, UA 7.0 5.0 - 8.0    Specific Gravity, UA 1.020 1.005 - 1.030    Protein, UA Negative Negative    Glucose, UA Negative Negative    Ketones, UA Negative Negative    Bilirubin (UA) Negative Negative    Occult Blood UA Negative Negative    Nitrite, UA Negative Negative    Urobilinogen, UA Negative <2.0 EU/dL    Leukocytes, UA Negative Negative     *Note: Due to a large number of results and/or encounters for the requested time period, some results have not been displayed. A complete set of results can be found in Results Review.         Imaging Results:  Imaging Results              CT Abdomen Pelvis With IV Contrast NO Oral Contrast (Final result)  Result time 12/11/24 12:21:22      Final result by Delfino Cox MD (12/11/24 12:21:22)                   Impression:      1.  Detrimental change.  Interval development of a 1.2 cm left lower lobe nodule.  Interval increase in size of a left hilar lymph node measuring 2.1 cm.  Interval development of a subtle 1.8 cm right hepatic lobe lesion near the gallbladder fossa.  Considering the patient's history, these changes must be considered neoplastic in origin until proven otherwise.  A repeat PET-CT scan is recommended.    2. Fluid filled loops of  small bowel.  1 or 2 borderline dilated small bowel loops within the left upper quadrant similar to the comparison study again seen.  Gastroenteritis or other diarrheal disease is could have this appearance in the right clinical setting.    3.  Progressive dilation of the intrahepatic and extrahepatic biliary tree.  Correlation with liver function tests and bilirubin level recommended.    4.  Negative for acute process otherwise.  Normal appendix.  Negative for renal stone disease or hydronephrosis.  Negative for free air.  Negative for inflammatory changes.    5.  Stable enlarged mesenteric and shoddy retroperitoneal lymph nodes in the epigastric region.  Cholecystectomy clips and gastric sleeve postoperative changes again seen.  Other stable findings as noted above.    All CT scans at this facility are performed  using dose modulation techniques as appropriate to performed exam including the following:  automated exposure control; adjustment of mA and/or kV according to the patients size (this includes techniques or standardized protocols for targeted exams where dose is matched to indication/reason for exam: i.e. extremities or head);  iterative reconstruction technique.      Electronically signed by: Delfino Cox MD  Date:    12/11/2024  Time:    12:21               Narrative:    EXAMINATION:  CT ABDOMEN PELVIS WITH IV CONTRAST, multiplanar reconstructions    CLINICAL HISTORY:  Generalized pain with diarrhea;    TECHNIQUE:  Axial images through the abdomen and pelvis were obtained with the use of IV contrast.  Sagittal and coronal reconstructions are provided for review.  Oral contrast was not utilized.    COMPARISON:  PET CT scan from March 12, 2024    FINDINGS:  LUNG BASES: Since the comparison PET-CT scan, the lingular mass has been resected.  Dependent atelectasis in the lung bases again seen.  Interval development of a 1.2 cm posterior left lower lobe pleural base nodule as measured on axial image 35.   Negative for pleural or pericardial effusions. The distal esophagus is normal.  Interval increase in size of a left hilar mass measuring 2.1 cm on axial image 10.  Extensive coronary artery calcifications again seen.  Small hiatal hernia again seen.    ABDOMEN: Cholecystectomy clips.  Mildly progressive prominence of the biliary tree with the common bile duct measuring 1.4 cm in diameter on axial image 66.  Subtle low-attenuation focus in the right hepatic lobe near the gallbladder fossa measuring 1.8 cm on axial image 82. The spleen appears normal.  The pancreas is unremarkable.  Kidneys and adrenal glands are normal.    Stable mesenteric and retroperitoneal prominent lymph nodes, the largest of which is adjacent to the pancreatic head measuring 2.0 x 1.1 cm on axial image 69.  There also some enlarged portacaval space lymph nodes.    Negative for scratch ascites or inflammatory change noted within the abdomen or pelvis.  Vascular calcifications are present without aneurysmal changes. Portal vein is patent.    Gastric sleeve postoperative changes noted.  Normal appendix.  Appropriate stool.  Fluid-filled loops of small bowel noted.  There are 1 or 2 borderline dilated small bowel loops within the left upper quadrant, similar to the comparison study.  Negative for dilated loops of colon.  Negative for free air.    PELVIS: The urinary bladder is unremarkable.    The female pelvic organs are normal. There are pelvic phleboliths.    Mild laxity of the linea alba with a small fat filled umbilical hernia again seen.  The abdominal wall is otherwise intact.    No significant osseous abnormality is identified.  Negative for significant spinal canal stenosis. Multilevel marginal spondylosis especially involving the thoracic region noted.  Degenerative facet arthropathy.  Age-appropriate degenerative changes of the pelvis and hips.  Vacuum phenomenon of the sacroiliac joints.    Negative for groin adenopathy.                                               The Emergency Provider reviewed the vital signs and test results, which are outlined above.     ED Discussion       12:58 PM: Reassessed pt at this time. Discussed with pt all pertinent ED information and results. Discussed pt dx and plan of tx. Gave pt all f/u and return to the ED instructions. All questions and concerns were addressed at this time. Pt expresses understanding of information and instructions, and is comfortable with plan to discharge. Pt is stable for discharge.    I discussed with patient and/or family/caretaker that evaluation in the ED does not suggest any emergent or life threatening medical conditions requiring immediate intervention beyond what was provided in the ED, and I believe patient is safe for discharge.  Regardless, an unremarkable evaluation in the ED does not preclude the development or presence of a serious of life threatening condition. As such, patient was instructed to return immediately for any worsening or change in current symptoms.         Medical Decision Making  Had a discussion with patient concerning concern for re-emergence carcinoma.  She will discuss this with primary care provider and Oncology.  She is able to tolerate fluids at the time of discharge.  Has had no episodes of diarrhea while in the department.  Instructed to return immediately for any new or worsening symptoms and she verbalized understanding.    Amount and/or Complexity of Data Reviewed  Labs: ordered. Decision-making details documented in ED Course.     Details: CBC and CMP show nonspecific findings, hepatitis-C antibody is negative, HIV is negative, lipase is within normal limits, magnesium is within normal limits, INR is within normal limits, urinalysis shows no evidence of UTI  Radiology: ordered. Decision-making details documented in ED Course.     Details: CT scan of the abdomen and pelvis shows  Detrimental change.  Interval development of a 1.2 cm left lower lobe  nodule.  Interval increase in size of a left hilar lymph node measuring 2.1 cm.  Interval development of a subtle 1.8 cm right hepatic lobe lesion near the gallbladder fossa.  Considering the patient's history, these changes must be considered neoplastic in origin until proven otherwise.  A repeat PET-CT scan is recommended.     2. Fluid filled loops of small bowel.  1 or 2 borderline dilated small bowel loops within the left upper quadrant similar to the comparison study again seen.  Gastroenteritis or other diarrheal disease is could have this appearance in the right clinical setting.     3.  Progressive dilation of the intrahepatic and extrahepatic biliary tree.  Correlation with liver function tests and bilirubin level recommended.     4.  Negative for acute process otherwise.  Normal appendix.  Negative for renal stone disease or hydronephrosis.  Negative for free air.  Negative for inflammatory changes.     5.  Stable enlarged mesenteric and shoddy retroperitoneal lymph nodes in the epigastric region.  Cholecystectomy clips and gastric sleeve postoperative changes again seen.  Other stable findings as noted above.      Risk  Prescription drug management.  Risk Details: Differential diagnosis includes but is not limited to: Gastroenteritis, electrolyte abnormality, bowel obstruction, UTI,                ED Medication(s):  Medications   sodium chloride 0.9% bolus 1,000 mL 1,000 mL (0 mLs Intravenous Stopped 12/11/24 1112)   iohexoL (OMNIPAQUE 350) injection 100 mL (100 mLs Intravenous Given 12/11/24 1134)       Discharge Medication List as of 12/11/2024 12:56 PM           Follow-up Information       Schedule an appointment as soon as possible for a visit  with Emil Zhang MD.    Specialty: Family Medicine  Contact information:  [address] [facility] [address] [phone]               Schedule an appointment as soon as possible for a visit  with Emerson Moran MD.    Specialty:  Hematology and Oncology  Contact information:  [address]  [place] [address]  [phone]                                 Scribe Attestation:   Scribe #1: I performed the above scribed service and the documentation accurately describes the services I performed. I attest to the accuracy of the note.     Attending:   Physician Attestation Statement for Scribe #1: I, Francesco Jones DO, personally performed the services described in this documentation, as scribed by Kam Martinez, in my presence, and it is both accurate and complete.           Clinical Impression       ICD-10-CM ICD-9-CM   1. Diarrhea, unspecified type  R19.7 787.91   2. Abnormal CT of the abdomen  R93.5 793.6       Disposition:   Disposition: Discharged  Condition: Stable        Francesco Jones DO  12/13/24 1813

## 2024-12-11 NOTE — TELEPHONE ENCOUNTER
Patient will discuss in her upcoming appointment scheduled for 12/31/24. Patient refused to stop taking ozempic because she doesn't want to gain the weight back. She will eat a banana a day and take otc medicine. Patient states she will try to stop taking ozempic until her appointment.

## 2024-12-11 NOTE — TELEPHONE ENCOUNTER
Patient is requesting Pet scan be done ASAP. Unable to reach scheduling dept to schedule appt.        ----- Message from [employer] sent at 12/11/2024  3:43 PM CST -----  Regarding: Pt Advice  Contact: [phone]  Pt calling regarding an appt that was suppose to be scheduled for tomorrow for a lung test. Appt not in system. Needs to know where she is to go. Please call  [phone]

## 2024-12-12 ENCOUNTER — DOCUMENTATION ONLY (OUTPATIENT)
Dept: HEMATOLOGY/ONCOLOGY | Facility: CLINIC | Age: 74
End: 2024-12-12
Payer: MEDICARE

## 2024-12-12 ENCOUNTER — TELEPHONE (OUTPATIENT)
Dept: CARDIOLOGY | Facility: CLINIC | Age: 74
End: 2024-12-12
Payer: MEDICARE

## 2024-12-12 ENCOUNTER — TELEPHONE (OUTPATIENT)
Dept: HEMATOLOGY/ONCOLOGY | Facility: CLINIC | Age: 74
End: 2024-12-12
Payer: MEDICARE

## 2024-12-12 NOTE — TELEPHONE ENCOUNTER
Message left returning patients call.      ----- Message from Tina sent at 12/12/2024  1:06 PM CST -----  Contact: Mobile  [phone]  Patient is returning a phone call.    Who left a message for the patient: Cindy Singer LPN to Mike Nuñez MD · Sarah Yao RN    Does patient know what this is regarding:  A message from Cindy Singer LPN to Mike Nuñez MD · Sarah Yao RN    Would you like a call back, or a response through your [facility] portal?:   Call

## 2024-12-12 NOTE — TELEPHONE ENCOUNTER
[facility] for patient to call back.        ----- Message from Steffanie sent at 12/12/2024  1:25 PM CST -----  Contact: self  [phone]  Type:  Patient Returning Call    Who Called:jeremiah  Who Left Message for Patient:Cindy  Does the patient know what this is regarding?:  Would the patient rather a call back or a response via [facility]? Call back   Best Call Back Number: [phone]  Additional Information:

## 2024-12-12 NOTE — TELEPHONE ENCOUNTER
Called patient to schedule an appointment with the Oncologist.     Appointment scheduled for: 12/24/2024

## 2024-12-12 NOTE — TELEPHONE ENCOUNTER
Appointment [facility] Instructions reviewed with patient.      Appt with Dr. Nuñez scheduled for 12/26/24      ----- Message from Steffanie sent at 12/12/2024  8:34 AM CST -----  Contact: self  [phone]  Type:  Needs Medical Advice    Who Called: Cassandra  Symptoms (please be specific): pet scan  How long has patient had these symptoms:   Pharmacy name and phone #:   Would the patient rather a call back or a response via [facility]? Call back   Best Call Back Number:  [phone]  Additional Information: patient called in this morning requesting a call back concerning her pet scan. Please call back  [phone]. Thanks zac

## 2024-12-14 ENCOUNTER — PATIENT MESSAGE (OUTPATIENT)
Dept: SPORTS MEDICINE | Facility: CLINIC | Age: 74
End: 2024-12-14
Payer: MEDICARE

## 2024-12-16 ENCOUNTER — PATIENT MESSAGE (OUTPATIENT)
Dept: SPORTS MEDICINE | Facility: CLINIC | Age: 74
End: 2024-12-16
Payer: MEDICARE

## 2024-12-16 ENCOUNTER — TELEPHONE (OUTPATIENT)
Dept: SPORTS MEDICINE | Facility: CLINIC | Age: 74
End: 2024-12-16
Payer: MEDICARE

## 2024-12-16 DIAGNOSIS — M25.512 BILATERAL SHOULDER PAIN, UNSPECIFIED CHRONICITY: Primary | ICD-10-CM

## 2024-12-16 DIAGNOSIS — M25.511 BILATERAL SHOULDER PAIN, UNSPECIFIED CHRONICITY: Primary | ICD-10-CM

## 2024-12-16 NOTE — PROGRESS NOTES
Patient ID: Cassandra Campos  YOB: 1950  MRN: [id number]    Referred By:  Patient of Dr. Barton    Occupation: Data Unavailable      History of Present Illness: Cassandra Campos is a right-hand dominant 74 y.o. female who presents today with Pain of the Right Shoulder, Pain of the Left Shoulder, Pain of the Left Knee, and Pain of the Right Knee     History of Present Illness    HPI:  Cassandra presents with complaints of shoulder and knee pain. 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 She received shoulder injections from Dr. Sewell in October and knee injections last month, but both areas are still causing discomfort. She states that the shoulder injections did not help and believes they were administered in the incorrect location. The pain is in the same spot as before, and her neck experiences stiffness.    Regarding knee pain, she expresses a desire for anti-inflammatory medication. She reports taking ibuprofen, specifically 800 mg (four 200 mg tablets), but it only provides temporary relief. She describes her pain as severe, expressing significant discomfort and anticipation for relief. She has not used knee braces in the past. The symptoms are impacting her work with children, affecting her ability to interact comfortably. Shoulder injections: Administered by Dr. Sewell in October, did not provide relief.  Knee injections: Administered last month, did not provide relief.    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)  - Job involves physically demanding tasks with young children  - Pain affecting ability to work comfortably  - Continues to work despite pain, showing dedication to job    HISTORY:  - Scientologist         Past  Medical History:   Past Medical History:   Diagnosis Date    Arthritis     hands    Bilateral bunions     Borderline glaucoma     De Quervain's disease (radial styloid tenosynovitis)     Gastritis     upper GI 2017    Hydradenitis     Hyperlipidemia     Hypertension     Insomnia     Migraines 2000    Nasal septum perforation     Obesity     Pneumonia     Restrictive airway disease     Sleep apnea     SVT (supraventricular tachycardia) 2013    Trigger finger     Type 2 diabetes mellitus      am 2024     Past Surgical History:   Procedure Laterality Date    AXILLARY HIDRADENITIS EXCISION Bilateral     BONE EXOSTOSIS EXCISION Right 2018    Procedure: EXCISION, EXOSTOSIS;  Surgeon: Jayro Pedraza Sr., MD;  Location: [facility];  Service: Orthopedics;  Laterality: Right;    BREAST BIOPSY Bilateral     both benign    BREAST SURGERY  1998    CARPAL TUNNEL RELEASE      bilateral    CARPAL TUNNEL RELEASE Right 2024    Procedure: RELEASE, CARPAL TUNNEL;  Surgeon: Jaime Oswald MD;  Location: [facility];  Service: Orthopedics;  Laterality: Right;    CARPAL TUNNEL RELEASE Left 2024    Procedure: RELEASE, CARPAL TUNNEL;  Surgeon: Jaime Oswald MD;  Location: [facility] OR;  Service: Orthopedics;  Laterality: Left;    CATARACT EXTRACTION Bilateral     OU     SECTION  1979    CHOLECYSTECTOMY  2014    COLONOSCOPY N/A 10/02/2020    Procedure: COLONOSCOPY;  Surgeon: Tushar Edwards MD;  Location: [facility];  Service: Endoscopy;  Laterality: N/A;    COLONOSCOPY W/ POLYPECTOMY  10/02/2020    Polyps x3, repeat 5 years; Tushar Edwards MD     CYST REMOVAL  2015    sebaceous cyst removed from face    DE QUERVAIN'S RELEASE Left 2020    Procedure: RELEASE, HAND, FOR DEQUERVAIN'S TENOSYNOVITIS;  Surgeon: Jaime Oswald MD;  Location: [facility] OR;  Service: Orthopedics;  Laterality: Left;    DE QUERVAIN'S RELEASE Right 2020    Procedure: RELEASE, HAND, FOR  DEQUERVAIN'S TENOSYNOVITIS;  Surgeon: Jaime Oswald MD;  Location: [facility] OR;  Service: Orthopedics;  Laterality: Right;    ENDOBRONCHIAL ULTRASOUND Bilateral 04/10/2024    Procedure: ENDOBRONCHIAL ULTRASOUND (EBUS);  Surgeon: Adrian Robin MD;  Location: [facility] ENDO;  Service: Pulmonary;  Laterality: Bilateral;    EYE SURGERY      gastric sleeve  02/13/2017    Dr. Watson    INJECTION OF ANESTHETIC AGENT AROUND MULTIPLE INTERCOSTAL NERVES  5/10/2024    Procedure: BLOCK, NERVE, INTERCOSTAL, 2 OR MORE;  Surgeon: Mike Nuñez MD;  Location: [facility] OR;  Service: Cardiothoracic;;    KNEE SURGERY Right     OLECRANON BURSECTOMY Right 07/25/2018    Procedure: BURSECTOMY, OLECRANON;  Surgeon: Jayro Pedraza Sr., MD;  Location: [facility] OR;  Service: Orthopedics;  Laterality: Right;    SURGICAL REMOVAL OF BUNION WITH OSTEOTOMY OF METATARSAL BONE Left 05/10/2019    Procedure: BUNIONECTOMY, WITH METATARSAL OSTEOTOMY;  Surgeon: Srinivasan Villanueva DPM;  Location: [facility] OR;  Service: Podiatry;  Laterality: Left;    SURGICAL REMOVAL OF BUNION WITH OSTEOTOMY OF METATARSAL BONE Right 06/28/2019    Procedure: BUNIONECTOMY, WITH METATARSAL OSTEOTOMY;  Surgeon: Srinivasan Villanueva DPM;  Location: [facility] OR;  Service: Podiatry;  Laterality: Right;    SURGICAL REMOVAL OF LYMPH NODE Left 5/10/2024    Procedure: EXCISION, LYMPH NODE;  Surgeon: Mike Nuñez MD;  Location: [facility] OR;  Service: Cardiothoracic;  Laterality: Left;    TONSILLECTOMY, ADENOIDECTOMY  1980s    TRANSESOPHAGEAL ECHOCARDIOGRAM WITH POSSIBLE CARDIOVERSION (LAKESHIA W/ POSS CARDIOVERSION) N/A 5/15/2024    Procedure: Transesophageal echo (LAKESHIA) intra-procedure log documentation;  Surgeon: Twan Pelaez MD;  Location: [facility] CATH LAB;  Service: Cardiology;  Laterality: N/A;    TRIGGER FINGER RELEASE Right 04/01/2015    Dr. Pedraza    XI ROBOTIC RATS,WITH LOBECTOMY,LUNG Left 5/10/2024    Procedure: XI ROBOTIC RATS,WITH LOBECTOMY,LUNG;  Surgeon: Mike Nuñez MD;  Location:  [facility] OR;  Service: Cardiothoracic;  Laterality: Left;  Lingulectomy     Family History   Problem Relation Name Age of Onset    Prostate cancer Brother      Diabetes Maternal Aunt Alondra Campos     Diabetes Cousin      Hypertension Maternal Grandmother Portia Orosco      Social History     Socioeconomic History    Marital status:     Number of children: 1   Occupational History    Occupation:  aid   Tobacco Use    Smoking status: Former     Current packs/day: 0.00     Average packs/day: 0.5 packs/day for 42.0 years (21.0 ttl pk-yrs)     Types: Cigarettes     Start date: 1970     Quit date: 2012     Years since quittin.9     Passive exposure: Past    Smokeless tobacco: Never   Substance and Sexual Activity    Alcohol use: Not Currently     Alcohol/week: 1.0 standard drink of alcohol     Types: 1 Glasses of wine per week     Comment: Glass red wine once a every 2 weeks    Drug use: Never    Sexual activity: Not Currently     Partners: Female     Birth control/protection: Abstinence, None   Social History Narrative    Single, part-time teacher. Masters degree biology.      Social Drivers of Health     Financial Resource Strain: Low Risk  (2023)    Overall Financial Resource Strain (CARDIA)     Difficulty of Paying Living Expenses: Not hard at all   Food Insecurity: Food Insecurity Present (2023)    Hunger Vital Sign     Worried About Running Out of Food in the Last Year: Never true     Ran Out of Food in the Last Year: Sometimes true   Transportation Needs: No Transportation Needs (2023)    PRAPARE - Transportation     Lack of Transportation (Medical): No     Lack of Transportation (Non-Medical): No   Physical Activity: Insufficiently Active (2023)    Exercise Vital Sign     Days of Exercise per Week: 3 days     Minutes of Exercise per Session: 20 min   Stress: No Stress Concern Present (2023)    [place] [employer] - Occupational  Stress Questionnaire     Feeling of Stress : Not at all   Housing Stability: Low Risk  (12/19/2023)    Housing Stability Vital Sign     Unable to Pay for Housing in the Last Year: No     Number of Places Lived in the Last Year: 1     Unstable Housing in the Last Year: No     Medication List with Changes/Refills   New Medications    DICLOFENAC (VOLTAREN) 75 MG EC TABLET    Take 1 tablet (75 mg total) by mouth 2 (two) times daily with meals.   Current Medications    ALBUTEROL (PROVENTIL/VENTOLIN HFA) 90 MCG/ACTUATION INHALER    INHALE 2 PUFFS INTO THE LUNGS EVERY 4 HOURS AS NEEDED FOR WHEEZING OR SHORTNESS OF BREATH.    BLOOD SUGAR DIAGNOSTIC STRP    To check blood glucose 1-2 times daily, to use with insurance preferred meter    BLOOD-GLUCOSE METER (ACCU-CHEK GUIDE ME GLUCOSE MTR) MISC    use to check blood glucose 2 times a day    BLOOD-GLUCOSE METER MISC    To check blood glucose 1-2 times daily, to use with insurance preferred meter    DICLOFENAC SODIUM (VOLTAREN ARTHRITIS PAIN) 1 % GEL    Apply 2 g topically once daily.    ESZOPICLONE (LUNESTA) 3 MG TAB    Take 1 tablet (3 mg total) by mouth every evening.    LANCETS MISC    To check blood glucose 1-2 times daily, to use with insurance preferred meter    LIDOCAINE (LIDODERM) 5 %    Place 1 patch onto the skin once daily. Remove & Discard patch within 12 hours or as directed by MD    LORAZEPAM (ATIVAN) 1 MG TABLET    Take 1 tablet (1 mg total) by mouth 2 (two) times daily.    LOSARTAN (COZAAR) 25 MG TABLET    TAKE 1 TABLET BY MOUTH ONCE  DAILY    METOPROLOL SUCCINATE (TOPROL-XL) 50 MG 24 HR TABLET    TAKE 1 TABLET BY MOUTH ONCE  DAILY    OMEPRAZOLE (PRILOSEC) 20 MG CAPSULE    TAKE 1 CAPSULE BY MOUTH ONCE  DAILY    SEMAGLUTIDE (OZEMPIC) 2 MG/DOSE (8 MG/3 ML) PNIJ    Inject 2 mg into the skin every 7 days.    TRIAMCINOLONE ACETONIDE 0.025 % LOTN    Apply small amount to affective areas twice a day  take cool showers or baths     Review of patient's allergies  indicates:  No Known Allergies    Physical Exam:   Body mass index is 24.8 kg/m².    Physical Exam  Detailed MSK exam:   Ortho/SPM Exam     Imaging:  XR Results:  Results for orders placed during the hospital encounter of 09/27/23    X-Ray Shoulder 2 or more views Bilat    Narrative  EXAM: XR SHOULDER COMPLETE 2 OR MORE VIEWS BILATERAL    CLINICAL HISTORY: [Pain]    FINDINGS: 8 views bilateral shoulders.  Mild AC joint degenerative changes.  No evidence of acute fracture or dislocation.  Normal mineralization.  Soft tissues are unremarkable.    Impression  No acute findings.    Finalized on: 9/27/2023 10:07 AM By:  Sergei Mcclure MD  [facility]# [id number]      2023-09-27 10:09:43.166    [facility]      MRI Results:  Results for orders placed during the hospital encounter of 11/18/23    MRI Shoulder Without Contrast Left    Narrative  EXAM:  MRI SHOULDER WITHOUT CONTRAST LEFT    CLINICAL HISTORY: Left shoulder pain. Shoulder pain, rotator cuff disorder suspected, xray done; [M25.512]-Pain in left shoulder./[G89.29]-Other chronic pain.    TECHNIQUE: Standard multiplanar, multisequence MR imaging protocol of the left shoulder was performed.    FINDINGS:  ROTATOR CUFF: Moderate to severe supraspinatus, infraspinatus and subscapularis tendinosis.  Tiny low-grade partial interstitial tear of the posterior supraspinatus tendon insertion at the footprint. Small high-grade partial articular surface tear of the subscapularis tendon insertion.  No other rotator cuff tear identified.    MUSCULATURE: Within normal limits.    BURSITIS: Mild subcoracoid bursitis.    LONG HEAD BICEPS TENDON: Intact and normal in position.    LABRUM and GLENOHUMERAL LIGAMENTS: No discernible tears.    MARROW: No fractures, marrow edema or suspicious bone lesions.    GLENOHUMERAL JOINT: No full-thickness chondral defects.  Normal alignment.  Small joint effusion.    AC JOINT: Mild  osteoarthritis with capsular hypertrophy and small marginal osteophytes.  Normal  alignment.    ACROMIAL ARCH: Type 1 acromion with minor anterior downsloping.  No os acromiale.    Impression  1.  Significant rotator cuff tendinosis.  Tiny low-grade interstitial tear supraspinatus tendon insertion.  Small high-grade partial articular surface tear subscapularis tendon insertion.  Mild subcoracoid bursitis.  2.  Mild acromioclavicular osteoarthritis.  Glenohumeral joint effusion.  Minimally downsloping acromion.    Finalized on: 11/20/2023 9:18 AM By:  Dmitri Helm MD  [facility]# [id number]      2023-11-20 09:20:55.740    [facility]     XR Results:  Results for orders placed during the hospital encounter of 07/18/24    X-ray Knee Ortho Right with Flexion    Narrative  EXAMINATION:  XR KNEE ORTHO RIGHT WITH FLEXION    CLINICAL HISTORY:  Pain in right knee    TECHNIQUE:  AP standing views of both knees, AP flexion views of both knees, lateral view of the right knee and Merchant views of both knees    COMPARISON:  04/25/2024    FINDINGS:  The joint spaces of all 3 compartments of the right knee appear to be relatively well maintained.  No joint effusion.  No acute fracture or dislocation.    Impression  1.  As above      Electronically signed by: Solomon Pennington DO  Date:    07/18/2024  Time:    11:39        Patient Instructions   Assessment:  Cassandra Campos is a 74 y.o. female with a chief complaint of Pain of the Right Shoulder, Pain of the Left Shoulder, Pain of the Left Knee, and Pain of the Right Knee    Encounter Diagnoses   Name Primary?    Osteoarthritis of AC (acromioclavicular) joints, bilateral Yes    Chronic pain of both shoulders     Primary osteoarthritis of both knees     Bilateral chronic knee pain       Plan:  Bilateral shoulder AC joint injections today.    Proper protocols after the injection included: no submerging pools, baths tubs, or hot tubs for 24 hr.  Showering is okay today.  Side effects of the corticosteroid injection can include elevated blood glucose levels and blood pressures, so  if you are taking medications for these, please monitor closely, and contact your PCP if any issues.  Red flag symptoms include fever, chills, nausea, vomiting, red, warm, tender joint at the area of injection.  If you are noticing these symptoms, they may be indicative of an infection, and please seek medical care immediately, either by calling our clinic or going to the emergency room.  Hinged knee brace provided for right knee.    At least 15 minutes were spent sizing, fitting, and educating regarding durable medical equipment today by clinical assistant under direction of Troy Fontenot MD.   We will trial anti-inflammatory.  Prescription for diclofenac 50 mg, take twice daily with meals.    Follow-up:  1 month with Dr. Barton or sooner if there are any problems between now and then.    Thank you for choosing [facility] and Dr. Troy Fontenot for your orthopedic & sports medicine care. It is our goal to provide you with exceptional care that will help keep you healthy, active, and get you back in the game.    Please do not hesitate to reach out to us via email, phone, or MyChart with any questions, concerns, or feedback.    If you are experiencing pain/discomfort ,or have questions after 5pm and would like to be connected to the [facility] on-call team, please call this number and specify which Sports Medicine provider is treating you: [phone]       A copy of today's visit note has been sent to the referring provider.           Troy Fontenot MD  Primary Care Sports Medicine    Disclaimer: This note was prepared using a voice recognition system and is likely to have sound alike errors within the text.     This note was generated with the assistance of ambient listening technology. Verbal consent was obtained by the patient and accompanying visitor(s) for the recording of patient appointment to facilitate this note. I attest to having  reviewed and edited the generated note for accuracy, though some syntax or spelling errors may persist. Please contact the author of this note for any clarification.

## 2024-12-16 NOTE — TELEPHONE ENCOUNTER
Called and spoke with patient regarding continued pain.  Advised patient that provider is out of office until after first of year and offered for her to see another provider.  Patient accepted appointment with Dr. Fontenot at [facility].

## 2024-12-17 ENCOUNTER — OFFICE VISIT (OUTPATIENT)
Dept: SPORTS MEDICINE | Facility: CLINIC | Age: 74
End: 2024-12-17
Payer: MEDICARE

## 2024-12-17 ENCOUNTER — HOSPITAL ENCOUNTER (OUTPATIENT)
Dept: RADIOLOGY | Facility: HOSPITAL | Age: 74
Discharge: HOME | End: 2024-12-17
Attending: STUDENT IN AN ORGANIZED HEALTH CARE EDUCATION/TRAINING PROGRAM
Payer: MEDICARE

## 2024-12-17 VITALS — BODY MASS INDEX: 24.8 KG/M2 | WEIGHT: 140 LBS | HEIGHT: 63 IN

## 2024-12-17 DIAGNOSIS — I15.9 SECONDARY HYPERTENSION: ICD-10-CM

## 2024-12-17 DIAGNOSIS — M17.0 PRIMARY OSTEOARTHRITIS OF BOTH KNEES: ICD-10-CM

## 2024-12-17 DIAGNOSIS — M19.011 OSTEOARTHRITIS OF AC (ACROMIOCLAVICULAR) JOINTS, BILATERAL: Primary | ICD-10-CM

## 2024-12-17 DIAGNOSIS — G89.29 CHRONIC PAIN OF BOTH SHOULDERS: ICD-10-CM

## 2024-12-17 DIAGNOSIS — M25.512 CHRONIC PAIN OF BOTH SHOULDERS: ICD-10-CM

## 2024-12-17 DIAGNOSIS — M25.561 BILATERAL CHRONIC KNEE PAIN: ICD-10-CM

## 2024-12-17 DIAGNOSIS — M25.511 CHRONIC PAIN OF BOTH SHOULDERS: ICD-10-CM

## 2024-12-17 DIAGNOSIS — G89.29 BILATERAL CHRONIC KNEE PAIN: ICD-10-CM

## 2024-12-17 DIAGNOSIS — M25.512 BILATERAL SHOULDER PAIN, UNSPECIFIED CHRONICITY: ICD-10-CM

## 2024-12-17 DIAGNOSIS — E11.65 TYPE 2 DIABETES MELLITUS WITH HYPERGLYCEMIA, WITHOUT LONG-TERM CURRENT USE OF INSULIN: ICD-10-CM

## 2024-12-17 DIAGNOSIS — M25.562 BILATERAL CHRONIC KNEE PAIN: ICD-10-CM

## 2024-12-17 DIAGNOSIS — M25.511 BILATERAL SHOULDER PAIN, UNSPECIFIED CHRONICITY: ICD-10-CM

## 2024-12-17 DIAGNOSIS — M19.012 OSTEOARTHRITIS OF AC (ACROMIOCLAVICULAR) JOINTS, BILATERAL: Primary | ICD-10-CM

## 2024-12-17 PROCEDURE — 3066F NEPHROPATHY DOC TX: CPT | Mod: CPTII,S$GLB,, | Performed by: STUDENT IN AN ORGANIZED HEALTH CARE EDUCATION/TRAINING PROGRAM

## 2024-12-17 PROCEDURE — 73030 X-RAY EXAM OF SHOULDER: CPT | Mod: TC,50

## 2024-12-17 PROCEDURE — 99214 OFFICE O/P EST MOD 30 MIN: CPT | Mod: 25,S$GLB,, | Performed by: STUDENT IN AN ORGANIZED HEALTH CARE EDUCATION/TRAINING PROGRAM

## 2024-12-17 PROCEDURE — 20606 DRAIN/INJ JOINT/BURSA W/US: CPT | Mod: 50,S$GLB,, | Performed by: STUDENT IN AN ORGANIZED HEALTH CARE EDUCATION/TRAINING PROGRAM

## 2024-12-17 PROCEDURE — 1160F RVW MEDS BY RX/DR IN RCRD: CPT | Mod: CPTII,S$GLB,, | Performed by: STUDENT IN AN ORGANIZED HEALTH CARE EDUCATION/TRAINING PROGRAM

## 2024-12-17 PROCEDURE — 4010F ACE/ARB THERAPY RXD/TAKEN: CPT | Mod: CPTII,S$GLB,, | Performed by: STUDENT IN AN ORGANIZED HEALTH CARE EDUCATION/TRAINING PROGRAM

## 2024-12-17 PROCEDURE — 3044F HG A1C LEVEL LT 7.0%: CPT | Mod: CPTII,S$GLB,, | Performed by: STUDENT IN AN ORGANIZED HEALTH CARE EDUCATION/TRAINING PROGRAM

## 2024-12-17 PROCEDURE — 1125F AMNT PAIN NOTED PAIN PRSNT: CPT | Mod: CPTII,S$GLB,, | Performed by: STUDENT IN AN ORGANIZED HEALTH CARE EDUCATION/TRAINING PROGRAM

## 2024-12-17 PROCEDURE — 73030 X-RAY EXAM OF SHOULDER: CPT | Mod: 26,50,, | Performed by: RADIOLOGY

## 2024-12-17 PROCEDURE — 3060F POS MICROALBUMINURIA REV: CPT | Mod: CPTII,S$GLB,, | Performed by: STUDENT IN AN ORGANIZED HEALTH CARE EDUCATION/TRAINING PROGRAM

## 2024-12-17 PROCEDURE — 99999 PR PBB SHADOW E&M-EST. PATIENT-LVL III: CPT | Mod: PBBFAC,,, | Performed by: STUDENT IN AN ORGANIZED HEALTH CARE EDUCATION/TRAINING PROGRAM

## 2024-12-17 PROCEDURE — 1159F MED LIST DOCD IN RCRD: CPT | Mod: CPTII,S$GLB,, | Performed by: STUDENT IN AN ORGANIZED HEALTH CARE EDUCATION/TRAINING PROGRAM

## 2024-12-17 RX ORDER — DICLOFENAC SODIUM 75 MG/1
75 TABLET, DELAYED RELEASE ORAL 2 TIMES DAILY WITH MEALS
Qty: 60 TABLET | Refills: 0 | Status: SHIPPED | OUTPATIENT
Start: 2024-12-17

## 2024-12-17 RX ORDER — BETAMETHASONE SODIUM PHOSPHATE AND BETAMETHASONE ACETATE 3; 3 MG/ML; MG/ML
6 INJECTION, SUSPENSION INTRA-ARTICULAR; INTRALESIONAL; INTRAMUSCULAR; SOFT TISSUE
Status: DISCONTINUED | OUTPATIENT
Start: 2024-12-17 | End: 2024-12-17 | Stop reason: HOSPADM

## 2024-12-17 RX ORDER — TRIAMCINOLONE ACETONIDE 40 MG/ML
40 INJECTION, SUSPENSION INTRA-ARTICULAR; INTRAMUSCULAR
Status: DISCONTINUED | OUTPATIENT
Start: 2024-12-17 | End: 2024-12-17 | Stop reason: HOSPADM

## 2024-12-17 RX ORDER — LIDOCAINE HYDROCHLORIDE 10 MG/ML
1 INJECTION, SOLUTION INFILTRATION; PERINEURAL
Status: DISCONTINUED | OUTPATIENT
Start: 2024-12-17 | End: 2024-12-17 | Stop reason: HOSPADM

## 2024-12-17 RX ADMIN — TRIAMCINOLONE ACETONIDE 40 MG: 40 INJECTION, SUSPENSION INTRA-ARTICULAR; INTRAMUSCULAR at 04:12

## 2024-12-17 RX ADMIN — LIDOCAINE HYDROCHLORIDE 1 ML: 10 INJECTION, SOLUTION INFILTRATION; PERINEURAL at 04:12

## 2024-12-17 RX ADMIN — BETAMETHASONE SODIUM PHOSPHATE AND BETAMETHASONE ACETATE 6 MG: 3; 3 INJECTION, SUSPENSION INTRA-ARTICULAR; INTRALESIONAL; INTRAMUSCULAR; SOFT TISSUE at 04:12

## 2024-12-17 NOTE — PROCEDURES
Intermediate Joint Aspiration/Injection    Date/Time: 12/17/2024 4:00 PM    Performed by: Troy Fontenot MD  Authorized by: Troy Fontenot MD    Consent Done?:  Yes (Verbal)  Indications:  Arthritis and pain  Site marked: The procedure site was marked    Timeout: Prior to procedure the correct patient, procedure, and site was verified      Location:  Shoulder  Acromioclavicular joint: Bilateral AC joint.  Ultrasonic Guidance for needle placement: Yes  Images are saved and documented.    Needle size:  25 G  Approach:  Superior  Medications:  6 mg betamethasone acetate-betamethasone sodium phosphate 6 mg/mL; 1 mL LIDOcaine HCL 10 mg/ml (1%) 10 mg/mL (1 %); 40 mg triamcinolone acetonide 40 mg/mL  Patient tolerance:  Patient tolerated the procedure well with no immediate complications       Additional Comments: Ultrasound guidance was used for needle localization. Images were saved and stored for documentation. The appropriate structures were visualized. Dynamic visualization of the needle was continuous throughout the procedures and maintained good position.     We discussed the proper protocols after the injection such as no submerging pools, baths tubs, or hot tubs for 24 hr.  Showering is okay today.  We also discussed that blood sugars can be elevated after an injection and asked patient to properly check their sugars over the next few days and contact their PCP if there are any concerns.  We discussed red flags such as fevers, chills, red, warm, tender joint at the area of injection to please seek medical care immediately.

## 2024-12-17 NOTE — PATIENT INSTRUCTIONS
Assessment:  Cassandra Campos is a 74 y.o. female with a chief complaint of Pain of the Right Shoulder, Pain of the Left Shoulder, Pain of the Left Knee, and Pain of the Right Knee    Encounter Diagnoses   Name Primary?    Osteoarthritis of AC (acromioclavicular) joints, bilateral Yes    Chronic pain of both shoulders     Primary osteoarthritis of both knees     Bilateral chronic knee pain       Plan:  Bilateral shoulder AC joint injections today.    Proper protocols after the injection included: no submerging pools, baths tubs, or hot tubs for 24 hr.  Showering is okay today.  Side effects of the corticosteroid injection can include elevated blood glucose levels and blood pressures, so if you are taking medications for these, please monitor closely, and contact your PCP if any issues.  Red flag symptoms include fever, chills, nausea, vomiting, red, warm, tender joint at the area of injection.  If you are noticing these symptoms, they may be indicative of an infection, and please seek medical care immediately, either by calling our clinic or going to the emergency room.  Hinged knee brace provided for right knee.    At least 15 minutes were spent sizing, fitting, and educating regarding durable medical equipment today by clinical assistant under direction of Troy Fontenot MD.   We will trial anti-inflammatory.  Prescription for diclofenac 50 mg, take twice daily with meals.    Follow-up:  1 month with Dr. Barton or sooner if there are any problems between now and then.    Thank you for choosing [facility] and Dr. Troy Fontenot for your orthopedic & sports medicine care. It is our goal to provide you with exceptional care that will help keep you healthy, active, and get you back in the game.    Please do not hesitate to reach out to us via email, phone, or MyChart with any questions, concerns, or feedback.    If you are experiencing pain/discomfort ,or have questions after 5pm and  would like to be connected to the [facility] on-call team, please call this number and specify which Sports Medicine provider is treating you: [phone]

## 2024-12-18 ENCOUNTER — PATIENT MESSAGE (OUTPATIENT)
Dept: INTERNAL MEDICINE | Facility: CLINIC | Age: 74
End: 2024-12-18
Payer: MEDICARE

## 2024-12-18 RX ORDER — LOSARTAN POTASSIUM 25 MG/1
25 TABLET ORAL DAILY
Qty: 90 TABLET | Refills: 2 | Status: SHIPPED | OUTPATIENT
Start: 2024-12-18

## 2024-12-18 NOTE — TELEPHONE ENCOUNTER
Refill Decision Note   Cassandra Campos  is requesting a refill authorization.  Brief Assessment and Rationale for Refill:  Approve     Medication Therapy Plan:  Reviewed acute care/admission visit notes; No follow up with PCP recommended by acute care provider; Approved per protocol      Extended chart review required: Yes   Comments:     Note composed:1:57 PM 12/18/2024

## 2024-12-18 NOTE — TELEPHONE ENCOUNTER
No care due was identified.  [facility] Embedded Care Due Messages. Reference number: [id number].   12/17/2024 9:36:18 PM CST

## 2024-12-18 NOTE — TELEPHONE ENCOUNTER
No care due was identified.  [facility] Embedded Care Due Messages. Reference number: [id number].   12/17/2024 9:34:18 PM CST

## 2024-12-18 NOTE — TELEPHONE ENCOUNTER
No care due was identified.  [facility] Embedded Care Due Messages. Reference number: [id number].   12/17/2024 9:33:10 PM CST

## 2024-12-18 NOTE — TELEPHONE ENCOUNTER
Refill Routing Note   Medication(s) are not appropriate for processing by [facility] for the following reason(s):        ED/Hospital Visit since last OV with provider    [facility] action(s):  Defer      Medication Therapy Plan: 12/11/24 ED VIST - Abnormal CT of the abdomen    Extended chart review required: Yes     Appointments  past 12m or future 3m with PCP    Date Provider   Last Visit   8/28/2024 Emil Zhang MD   Next Visit   12/17/2024 Emil Zhang MD   ED visits in past 90 days: 1        Note composed:1:53 PM 12/18/2024

## 2024-12-18 NOTE — TELEPHONE ENCOUNTER
Refill Routing Note   Medication(s) are not appropriate for processing by [facility] for the following reason(s):        ED/Hospital Visit since last OV with provider    [facility] action(s):  Defer      Medication Therapy Plan: 12/11/24 ED VIST - Abnormal CT of the abdomen    Extended chart review required: Yes     Appointments  past 12m or future 3m with PCP    Date Provider   Last Visit   8/28/2024 Emil Zhang MD   Next Visit   12/17/2024 Emil Zhang MD   ED visits in past 90 days: 1        Note composed:1:55 PM 12/18/2024

## 2024-12-18 NOTE — TELEPHONE ENCOUNTER
Please see the attached refill request.   High Dose Vitamin A Pregnancy And Lactation Text: High dose vitamin A therapy is contraindicated during pregnancy and breast feeding. Topical Clindamycin Counseling: Patient counseled that this medication may cause skin irritation or allergic reactions.  In the event of skin irritation, the patient was advised to reduce the amount of the drug applied or use it less frequently.   The patient verbalized understanding of the proper use and possible adverse effects of clindamycin.  All of the patient's questions and concerns were addressed. Isotretinoin Pregnancy And Lactation Text: This medication is Pregnancy Category X and is considered extremely dangerous during pregnancy. It is unknown if it is excreted in breast milk. Sarecycline Pregnancy And Lactation Text: This medication is Pregnancy Category D and not consider safe during pregnancy. It is also excreted in breast milk. Topical Sulfur Applications Counseling: Topical Sulfur Counseling: Patient counseled that this medication may cause skin irritation or allergic reactions.  In the event of skin irritation, the patient was advised to reduce the amount of the drug applied or use it less frequently.   The patient verbalized understanding of the proper use and possible adverse effects of topical sulfur application.  All of the patient's questions and concerns were addressed. Spironolactone Pregnancy And Lactation Text: This medication can cause feminization of the male fetus and should be avoided during pregnancy. The active metabolite is also found in breast milk. Azithromycin Counseling:  I discussed with the patient the risks of azithromycin including but not limited to GI upset, allergic reaction, drug rash, diarrhea, and yeast infections. Winlevi Counseling:  I discussed with the patient the risks of topical clascoterone including but not limited to erythema, scaling, itching, and stinging. Patient voiced their understanding. Bactrim Counseling:  I discussed with the patient the risks of sulfa antibiotics including but not limited to GI upset, allergic reaction, drug rash, diarrhea, dizziness, photosensitivity, and yeast infections.  Rarely, more serious reactions can occur including but not limited to aplastic anemia, agranulocytosis, methemoglobinemia, blood dyscrasias, liver or kidney failure, lung infiltrates or desquamative/blistering drug rashes. Birth Control Pills Pregnancy And Lactation Text: This medication should be avoided if pregnant and for the first 30 days post-partum. Azithromycin Pregnancy And Lactation Text: This medication is considered safe during pregnancy and is also secreted in breast milk. Azelaic Acid Counseling: Patient counseled that medicine may cause skin irritation and to avoid applying near the eyes.  In the event of skin irritation, the patient was advised to reduce the amount of the drug applied or use it less frequently.   The patient verbalized understanding of the proper use and possible adverse effects of azelaic acid.  All of the patient's questions and concerns were addressed. Tetracycline Counseling: Patient counseled regarding possible photosensitivity and increased risk for sunburn.  Patient instructed to avoid sunlight, if possible.  When exposed to sunlight, patients should wear protective clothing, sunglasses, and sunscreen.  The patient was instructed to call the office immediately if the following severe adverse effects occur:  hearing changes, easy bruising/bleeding, severe headache, or vision changes.  The patient verbalized understanding of the proper use and possible adverse effects of tetracycline.  All of the patient's questions and concerns were addressed. Patient understands to avoid pregnancy while on therapy due to potential birth defects. Doxycycline Pregnancy And Lactation Text: This medication is Pregnancy Category D and not consider safe during pregnancy. It is also excreted in breast milk but is considered safe for shorter treatment courses. Topical Retinoid counseling:  Patient advised to apply a pea-sized amount only at bedtime and wait 30 minutes after washing their face before applying.  If too drying, patient may add a non-comedogenic moisturizer. The patient verbalized understanding of the proper use and possible adverse effects of retinoids.  All of the patient's questions and concerns were addressed. Benzoyl Peroxide Counseling: Patient counseled that medicine may cause skin irritation and bleach clothing.  In the event of skin irritation, the patient was advised to reduce the amount of the drug applied or use it less frequently.   The patient verbalized understanding of the proper use and possible adverse effects of benzoyl peroxide.  All of the patient's questions and concerns were addressed. Erythromycin Pregnancy And Lactation Text: This medication is Pregnancy Category B and is considered safe during pregnancy. It is also excreted in breast milk. Tazorac Counseling:  Patient advised that medication is irritating and drying.  Patient may need to apply sparingly and wash off after an hour before eventually leaving it on overnight.  The patient verbalized understanding of the proper use and possible adverse effects of tazorac.  All of the patient's questions and concerns were addressed. Dapsone Pregnancy And Lactation Text: This medication is Pregnancy Category C and is not considered safe during pregnancy or breast feeding. Minocycline Counseling: Patient advised regarding possible photosensitivity and discoloration of the teeth, skin, lips, tongue and gums.  Patient instructed to avoid sunlight, if possible.  When exposed to sunlight, patients should wear protective clothing, sunglasses, and sunscreen.  The patient was instructed to call the office immediately if the following severe adverse effects occur:  hearing changes, easy bruising/bleeding, severe headache, or vision changes.  The patient verbalized understanding of the proper use and possible adverse effects of minocycline.  All of the patient's questions and concerns were addressed. Tazorac Pregnancy And Lactation Text: This medication is not safe during pregnancy. It is unknown if this medication is excreted in breast milk. Isotretinoin Counseling: Patient should get monthly blood tests, not donate blood, not drive at night if vision affected, not share medication, and not undergo elective surgery for 6 months after tx completed. Side effects reviewed, pt to contact office should one occur. High Dose Vitamin A Counseling: Side effects reviewed, pt to contact office should one occur. Sarecycline Counseling: Patient advised regarding possible photosensitivity and discoloration of the teeth, skin, lips, tongue and gums.  Patient instructed to avoid sunlight, if possible.  When exposed to sunlight, patients should wear protective clothing, sunglasses, and sunscreen.  The patient was instructed to call the office immediately if the following severe adverse effects occur:  hearing changes, easy bruising/bleeding, severe headache, or vision changes.  The patient verbalized understanding of the proper use and possible adverse effects of sarecycline.  All of the patient's questions and concerns were addressed. Topical Sulfur Applications Pregnancy And Lactation Text: This medication is Pregnancy Category C and has an unknown safety profile during pregnancy. It is unknown if this topical medication is excreted in breast milk. Topical Clindamycin Pregnancy And Lactation Text: This medication is Pregnancy Category B and is considered safe during pregnancy. It is unknown if it is excreted in breast milk. Winlevi Pregnancy And Lactation Text: This medication is considered safe during pregnancy and breastfeeding. Spironolactone Counseling: Patient advised regarding risks of diarrhea, abdominal pain, hyperkalemia, birth defects (for female patients), liver toxicity and renal toxicity. The patient may need blood work to monitor liver and kidney function and potassium levels while on therapy. The patient verbalized understanding of the proper use and possible adverse effects of spironolactone.  All of the patient's questions and concerns were addressed. Include Pregnancy/Lactation Warning?: No Bactrim Pregnancy And Lactation Text: This medication is Pregnancy Category D and is known to cause fetal risk.  It is also excreted in breast milk. Aklief counseling:  Patient advised to apply a pea-sized amount only at bedtime and wait 30 minutes after washing their face before applying.  If too drying, patient may add a non-comedogenic moisturizer.  The most commonly reported side effects including irritation, redness, scaling, dryness, stinging, burning, itching, and increased risk of sunburn.  The patient verbalized understanding of the proper use and possible adverse effects of retinoids.  All of the patient's questions and concerns were addressed. Detail Level: Zone Dapsone Counseling: I discussed with the patient the risks of dapsone including but not limited to hemolytic anemia, agranulocytosis, rashes, methemoglobinemia, kidney failure, peripheral neuropathy, headaches, GI upset, and liver toxicity.  Patients who start dapsone require monitoring including baseline LFTs and weekly CBCs for the first month, then every month thereafter.  The patient verbalized understanding of the proper use and possible adverse effects of dapsone.  All of the patient's questions and concerns were addressed. Azelaic Acid Pregnancy And Lactation Text: This medication is considered safe during pregnancy and breast feeding. Aklief Pregnancy And Lactation Text: It is unknown if this medication is safe to use during pregnancy.  It is unknown if this medication is excreted in breast milk.  Breastfeeding women should use the topical cream on the smallest area of the skin for the shortest time needed while breastfeeding.  Do not apply to nipple and areola. Doxycycline Counseling:  Patient counseled regarding possible photosensitivity and increased risk for sunburn.  Patient instructed to avoid sunlight, if possible.  When exposed to sunlight, patients should wear protective clothing, sunglasses, and sunscreen.  The patient was instructed to call the office immediately if the following severe adverse effects occur:  hearing changes, easy bruising/bleeding, severe headache, or vision changes.  The patient verbalized understanding of the proper use and possible adverse effects of doxycycline.  All of the patient's questions and concerns were addressed. Birth Control Pills Counseling: Birth Control Pill Counseling: I discussed with the patient the potential side effects of OCPs including but not limited to increased risk of stroke, heart attack, thrombophlebitis, deep venous thrombosis, hepatic adenomas, breast changes, GI upset, headaches, and depression.  The patient verbalized understanding of the proper use and possible adverse effects of OCPs. All of the patient's questions and concerns were addressed. Benzoyl Peroxide Pregnancy And Lactation Text: This medication is Pregnancy Category C. It is unknown if benzoyl peroxide is excreted in breast milk. Topical Retinoid Pregnancy And Lactation Text: This medication is Pregnancy Category C. It is unknown if this medication is excreted in breast milk. Erythromycin Counseling:  I discussed with the patient the risks of erythromycin including but not limited to GI upset, allergic reaction, drug rash, diarrhea, increase in liver enzymes, and yeast infections.

## 2024-12-18 NOTE — TELEPHONE ENCOUNTER
Refill Routing Note   Medication(s) are not appropriate for processing by [facility] for the following reason(s):        Responsible provider unclear  ED/Hospital Visit since last OV with provider    [facility] action(s):  Defer      Medication Therapy Plan: Last ordered: by Lakshmi Vega NP. Recent OV but no current order by PCP      Appointments  past 12m or future 3m with PCP    Date Provider   Last Visit   11/8/2024 Lakshmi Vega NP   Next Visit   Visit date not found Lakshmi Vega NP   ED visits in past 90 days: 1        Note composed:11:53 AM 12/18/2024

## 2024-12-20 ENCOUNTER — OFFICE VISIT (OUTPATIENT)
Dept: INTERNAL MEDICINE | Facility: CLINIC | Age: 74
End: 2024-12-20
Payer: MEDICARE

## 2024-12-20 VITALS
HEIGHT: 63 IN | HEART RATE: 83 BPM | SYSTOLIC BLOOD PRESSURE: 118 MMHG | OXYGEN SATURATION: 95 % | TEMPERATURE: 99 F | WEIGHT: 149.94 LBS | DIASTOLIC BLOOD PRESSURE: 62 MMHG | BODY MASS INDEX: 26.57 KG/M2

## 2024-12-20 DIAGNOSIS — G47.00 INSOMNIA, UNSPECIFIED TYPE: Primary | ICD-10-CM

## 2024-12-20 DIAGNOSIS — R45.89 ANXIETY ABOUT HEALTH: ICD-10-CM

## 2024-12-20 DIAGNOSIS — C34.32 MALIGNANT NEOPLASM OF LOWER LOBE OF LEFT LUNG: ICD-10-CM

## 2024-12-20 PROCEDURE — 99999 PR PBB SHADOW E&M-EST. PATIENT-LVL V: CPT | Mod: PBBFAC,,,

## 2024-12-20 RX ORDER — AMITRIPTYLINE HYDROCHLORIDE 50 MG/1
50 TABLET, FILM COATED ORAL NIGHTLY
Qty: 90 TABLET | Refills: 3 | Status: SHIPPED | OUTPATIENT
Start: 2024-12-20 | End: 2025-12-20

## 2024-12-20 NOTE — PROGRESS NOTES
Cassandra Campos  12/20/2024  [id number]    Emil Zhang MD  Patient Care Team:  Emil Zhang MD as PCP - General (Family Medicine)  Duane Davila MD (Cardiovascular Disease)  Sultana Sofia MD as Consulting Physician (Dermatology)  Eduardo Lawrence MD (Ophthalmology)  Twan Pelaez MD as Consulting Physician (Cardiology)  Deanna Soto DPM as Consulting Physician (Podiatry)  Rashaad Watson MD (Inactive) as Consulting Physician (General Surgery)          Visit Type:an urgent visit for an existing problem     Chief Complaint:  Chief Complaint   Patient presents with    Insomnia       History of Present Illness:    History of Present Illness    CHIEF COMPLAINT:  Ms. Campos presents today with sleep disturbances and headaches.    SLEEP:  She reports sleep onset around 10:00-10:30 PM but consistently wakes at 3:00-4:00 AM to use the bathroom and is unable to return to sleep. She drinks liquids throughout the night. She currently takes Lunesta for sleep management.    HEADACHES:  She experiences headaches occurring early in the morning. She previously took amitriptyline for headache management but has discontinued it.    CURRENT MEDICATIONS:  She takes Lunesta for sleep and lorazepam during the day.    RECENT MEDICAL EVENTS:  She recently visited the emergency room for diarrhea. During this visit, imaging revealed two spots on her lung and one on her liver. She is scheduled for a PET scan and oncology consultation.      ROS:  General: -fever, -chills, -fatigue, -weight gain, -weight loss  Eyes: -vision changes, -redness, -discharge  ENT: -ear pain, -nasal congestion, -sore throat  Cardiovascular: -chest pain, -palpitations, -lower extremity edema  Respiratory: -cough, -shortness of breath  Gastrointestinal: -abdominal pain, -nausea, -vomiting, +diarrhea, -constipation, -blood in stool  Genitourinary: -dysuria, -hematuria, -frequency, +nocturia  Musculoskeletal: -joint pain, -muscle pain  Skin: -rash,  -lesion  Neurological: +headache, -dizziness, -numbness, -tingling  Psychiatric: -anxiety, -depression, +sleep difficulty            History:  Past Medical History:   Diagnosis Date    Arthritis     hands    Bilateral bunions     Borderline glaucoma     De Quervain's disease (radial styloid tenosynovitis)     Gastritis     upper GI 2017    Hydradenitis     Hyperlipidemia     Hypertension     Insomnia     Migraines 2000    Nasal septum perforation     Obesity     Pneumonia     Restrictive airway disease     Sleep apnea     SVT (supraventricular tachycardia) 2013    Trigger finger     Type 2 diabetes mellitus      am 2024     Past Surgical History:   Procedure Laterality Date    AXILLARY HIDRADENITIS EXCISION Bilateral     BONE EXOSTOSIS EXCISION Right 2018    Procedure: EXCISION, EXOSTOSIS;  Surgeon: Jayro Pedraza Sr., MD;  Location: [facility] OR;  Service: Orthopedics;  Laterality: Right;    BREAST BIOPSY Bilateral     both benign    BREAST SURGERY  1998    CARPAL TUNNEL RELEASE      bilateral    CARPAL TUNNEL RELEASE Right 2024    Procedure: RELEASE, CARPAL TUNNEL;  Surgeon: Jaime Oswald MD;  Location: [facility] OR;  Service: Orthopedics;  Laterality: Right;    CARPAL TUNNEL RELEASE Left 2024    Procedure: RELEASE, CARPAL TUNNEL;  Surgeon: Jaime Oswald MD;  Location: [facility] OR;  Service: Orthopedics;  Laterality: Left;    CATARACT EXTRACTION Bilateral     OU     SECTION  1979    CHOLECYSTECTOMY  2014    COLONOSCOPY N/A 10/02/2020    Procedure: COLONOSCOPY;  Surgeon: Tushar Edwards MD;  Location: [facility];  Service: Endoscopy;  Laterality: N/A;    COLONOSCOPY W/ POLYPECTOMY  10/02/2020    Polyps x3, repeat 5 years; Tushar Edwards MD     CYST REMOVAL  2015    sebaceous cyst removed from face    DE QUERVAIN'S RELEASE Left 2020    Procedure: RELEASE, HAND, FOR DEQUERVAIN'S TENOSYNOVITIS;  Surgeon: Jaime Oswald MD;  Location:  [facility] OR;  Service: Orthopedics;  Laterality: Left;    DE QUERVAIN'S RELEASE Right 11/20/2020    Procedure: RELEASE, HAND, FOR DEQUERVAIN'S TENOSYNOVITIS;  Surgeon: Jaime Oswald MD;  Location: [facility] OR;  Service: Orthopedics;  Laterality: Right;    ENDOBRONCHIAL ULTRASOUND Bilateral 04/10/2024    Procedure: ENDOBRONCHIAL ULTRASOUND (EBUS);  Surgeon: Adrian Robin MD;  Location: [facility] ENDO;  Service: Pulmonary;  Laterality: Bilateral;    EYE SURGERY      gastric sleeve  02/13/2017    Dr. Watson    INJECTION OF ANESTHETIC AGENT AROUND MULTIPLE INTERCOSTAL NERVES  5/10/2024    Procedure: BLOCK, NERVE, INTERCOSTAL, 2 OR MORE;  Surgeon: Mike Nuñez MD;  Location: [facility] OR;  Service: Cardiothoracic;;    KNEE SURGERY Right     OLECRANON BURSECTOMY Right 07/25/2018    Procedure: BURSECTOMY, OLECRANON;  Surgeon: Jayro Pedraza Sr., MD;  Location: [facility] OR;  Service: Orthopedics;  Laterality: Right;    SURGICAL REMOVAL OF BUNION WITH OSTEOTOMY OF METATARSAL BONE Left 05/10/2019    Procedure: BUNIONECTOMY, WITH METATARSAL OSTEOTOMY;  Surgeon: Srinivasan Villanueva DPM;  Location: [facility] OR;  Service: Podiatry;  Laterality: Left;    SURGICAL REMOVAL OF BUNION WITH OSTEOTOMY OF METATARSAL BONE Right 06/28/2019    Procedure: BUNIONECTOMY, WITH METATARSAL OSTEOTOMY;  Surgeon: Srinivasan Villanueva DPM;  Location: [facility] OR;  Service: Podiatry;  Laterality: Right;    SURGICAL REMOVAL OF LYMPH NODE Left 5/10/2024    Procedure: EXCISION, LYMPH NODE;  Surgeon: Mike Nuñez MD;  Location: [facility] OR;  Service: Cardiothoracic;  Laterality: Left;    TONSILLECTOMY, ADENOIDECTOMY  1980s    TRANSESOPHAGEAL ECHOCARDIOGRAM WITH POSSIBLE CARDIOVERSION (LAKESHIA W/ POSS CARDIOVERSION) N/A 5/15/2024    Procedure: Transesophageal echo (LAKESHIA) intra-procedure log documentation;  Surgeon: Twan Pelaez MD;  Location: [facility] CATH LAB;  Service: Cardiology;  Laterality: N/A;    TRIGGER FINGER RELEASE Right 04/01/2015    Dr. Pedraza    XI ROBOTIC RATS,WITH  LOBECTOMY,LUNG Left 5/10/2024    Procedure: XI ROBOTIC RATS,WITH LOBECTOMY,LUNG;  Surgeon: Mike Nuñez MD;  Location: [facility];  Service: Cardiothoracic;  Laterality: Left;  Lingulectomy     Family History   Problem Relation Name Age of Onset    Prostate cancer Brother      Diabetes Maternal Aunt Alondra Campos     Diabetes Cousin      Hypertension Maternal Grandmother Portia Orosco      Social History     Socioeconomic History    Marital status:     Number of children: 1   Occupational History    Occupation:  aid   Tobacco Use    Smoking status: Former     Current packs/day: 0.00     Average packs/day: 0.5 packs/day for 42.0 years (21.0 ttl pk-yrs)     Types: Cigarettes     Start date: 1970     Quit date: 2012     Years since quittin.9     Passive exposure: Past    Smokeless tobacco: Never   Substance and Sexual Activity    Alcohol use: Not Currently     Alcohol/week: 1.0 standard drink of alcohol     Types: 1 Glasses of wine per week     Comment: Glass red wine once a every 2 weeks    Drug use: Never    Sexual activity: Not Currently     Partners: Female     Birth control/protection: Abstinence, None   Social History Narrative    Single, part-time teacher. Masters degree biology.      Social Drivers of Health     Financial Resource Strain: Patient Declined (2024)    Overall Financial Resource Strain (CARDIA)     Difficulty of Paying Living Expenses: Patient declined   Food Insecurity: Patient Declined (2024)    Hunger Vital Sign     Worried About Running Out of Food in the Last Year: Patient declined     Ran Out of Food in the Last Year: Patient declined   Transportation Needs: No Transportation Needs (2023)    PRAPARE - Transportation     Lack of Transportation (Medical): No     Lack of Transportation (Non-Medical): No   Physical Activity: Insufficiently Active (2024)    Exercise Vital Sign     Days of Exercise per Week: 3 days     Minutes of  Exercise per Session: 20 min   Stress: Stress Concern Present (12/19/2024)    [employer] - Occupational Stress Questionnaire     Feeling of Stress : Very much   Housing Stability: High Risk (12/19/2024)    Housing Stability Vital Sign     Unable to Pay for Housing in the Last Year: Yes     Patient Active Problem List   Diagnosis    Hyperlipidemia associated with type 2 diabetes mellitus    Insomnia    Hip arthritis    Obesity    Paroxysmal SVT (supraventricular tachycardia)    GERD (gastroesophageal reflux disease)    Prepatellar bursitis of right knee    Transient synovitis of right knee    Chondromalacia of right knee    Primary hypertension    Calcified granuloma of lung    Osteopenia    Onychomycosis of multiple toenails with type 2 diabetes mellitus    Cough    Controlled type 2 diabetes mellitus without complication, without long-term current use of insulin    Secondary hypertension    Unequal blood pressures in paired extremities    De Quervain's disease (radial styloid tenosynovitis)    Breast screening    Tenosynovitis, de Quervain    Skin tag    Panic attack as reaction to stress    Mild intermittent asthma    BMI 31.0-31.9,adult    Elevated liver enzymes    Iron deficiency    Aortic atherosclerosis    Anxiety    Bilateral carpal tunnel syndrome    Major depressive disorder, single episode, mild    Solitary pulmonary nodule    Pelvic pain    Abdominal wall mass    Hilar mass    Hilar adenopathy    Malignant neoplasm of lower lobe of left lung    Preop cardiovascular exam    Calcification of aorta    Chronic bronchitis, unspecified chronic bronchitis type    Fall    Controlled type 2 diabetes mellitus without complication, with long-term current use of insulin    Typical atrial flutter    Hand swelling    Weakness    Somnolence    Tachycardia    Localized edema     Review of patient's allergies indicates:  No Known Allergies    The following were reviewed at this visit: active  problem list, medication list, allergies, family history, social history, and health maintenance.    Medications:  Current Outpatient Medications on File Prior to Visit   Medication Sig Dispense Refill    albuterol (PROVENTIL/VENTOLIN HFA) 90 mcg/actuation inhaler INHALE 2 PUFFS INTO THE LUNGS EVERY 4 HOURS AS NEEDED FOR WHEEZING OR SHORTNESS OF BREATH. 18 g 1    blood sugar diagnostic Strp To check blood glucose 1-2 times daily, to use with insurance preferred meter 100 strip 2    blood-glucose meter (ACCU-CHEK GUIDE ME GLUCOSE MTR) Misc use to check blood glucose 2 times a day 1 each 0    blood-glucose meter Misc To check blood glucose 1-2 times daily, to use with insurance preferred meter 1 each 0    diclofenac (VOLTAREN) 75 MG EC tablet Take 1 tablet (75 mg total) by mouth 2 (two) times daily with meals. 60 tablet 0    diclofenac sodium (VOLTAREN ARTHRITIS PAIN) 1 % Gel Apply 2 g topically once daily. 400 g 1    eszopiclone (LUNESTA) 3 mg Tab Take 1 tablet (3 mg total) by mouth every evening. 30 tablet 1    lancets Misc To check blood glucose 1-2 times daily, to use with insurance preferred meter 100 each 1    LIDOcaine (LIDODERM) 5 % Place 1 patch onto the skin once daily. Remove & Discard patch within 12 hours or as directed by MD 15 patch 1    LORazepam (ATIVAN) 1 MG tablet Take 1 tablet (1 mg total) by mouth 2 (two) times daily. 60 tablet 1    losartan (COZAAR) 25 MG tablet Take 1 tablet (25 mg total) by mouth once daily. 90 tablet 2    metoprolol succinate (TOPROL-XL) 50 MG 24 hr tablet TAKE 1 TABLET BY MOUTH ONCE  DAILY 90 tablet 3    omeprazole (PRILOSEC) 20 MG capsule TAKE 1 CAPSULE BY MOUTH ONCE  DAILY 90 capsule 3    semaglutide (OZEMPIC) 2 mg/dose (8 mg/3 mL) PnIj Inject 2 mg into the skin every 7 days. 3 mL 2    triamcinolone acetonide 0.025 % Lotn Apply small amount to affective areas twice a day  take cool showers or baths 60 mL 0     Current Facility-Administered Medications on File Prior to Visit    Medication Dose Route Frequency Provider Last Rate Last Admin    sodium chloride 0.9% flush 10 mL  10 mL Intravenous PRN Mike Nuñez MD        sodium chloride 0.9% flush 10 mL  10 mL Intravenous PRN Mike Nuñez MD           Medications have been reviewed and reconciled with patient at this visit.  Barriers to medications reviewed with patient.    Adverse reactions to current medications reviewed with patient..    Over the counter medications reviewed and reconciled with patient.    Exam:  Wt Readings from Last 3 Encounters:   12/20/24 68 kg (149 lb 14.6 oz)   12/17/24 63.5 kg (140 lb)   12/11/24 65.8 kg (145 lb)     Temp Readings from Last 3 Encounters:   12/20/24 98.5 °F (36.9 °C) (Tympanic)   12/11/24 99.2 °F (37.3 °C) (Oral)   11/19/24 96.1 °F (35.6 °C) (Tympanic)     BP Readings from Last 3 Encounters:   12/20/24 118/62   12/11/24 135/79   12/06/24 102/68     Pulse Readings from Last 3 Encounters:   12/20/24 83   12/11/24 91   12/06/24 90     Body mass index is 26.56 kg/m².    Physical Exam  Nursing note reviewed.   HENT:      Head: Normocephalic and atraumatic.   Cardiovascular:      Rate and Rhythm: Normal rate and regular rhythm.      Heart sounds: Normal heart sounds.   Pulmonary:      Effort: Pulmonary effort is normal. No respiratory distress.      Breath sounds: Normal breath sounds.   Neurological:      Mental Status: She is alert and oriented to person, place, and time.   Psychiatric:         Mood and Affect: Mood normal.         Behavior: Behavior normal.         Thought Content: Thought content normal.         Judgment: Judgment normal.           Laboratory Reviewed ({Yes)  Lab Results   Component Value Date    WBC 5.21 12/11/2024    HGB 12.6 12/11/2024    HCT 38.5 12/11/2024     12/11/2024    CHOL 184 12/26/2023    TRIG 128 12/26/2023    HDL 48 12/26/2023    ALT 19 12/11/2024    AST 14 12/11/2024     12/11/2024    K 3.7 12/11/2024     12/11/2024    CREATININE 0.6  12/11/2024    BUN 5 (L) 12/11/2024    CO2 22 (L) 12/11/2024    TSH 3.220 09/12/2022    INR 1.0 12/11/2024    GLUF 121 (H) 12/15/2022    HGBA1C 5.7 (H) 08/28/2024    MICROALBUR 12 08/21/2020       Cassandra was seen today for insomnia.    Diagnoses and all orders for this visit:    Insomnia, unspecified type  -     amitriptyline (ELAVIL) 50 MG tablet; Take 1 tablet (50 mg total) by mouth every evening.    Anxiety about health  -     amitriptyline (ELAVIL) 50 MG tablet; Take 1 tablet (50 mg total) by mouth every evening.    Malignant neoplasm of lower lobe of left lung            Assessment & Plan    PRIMARY INSOMNIA:  - Considered switch from Lunesta to Seroquel for sleep management due to early morning awakenings.  - Restarted amitriptyline 50mg to address sleep issues and morning headaches.  - Continued Lunesta at current highest dose for sleep management.  - Restarted amitriptyline 50mg daily for sleep issues and morning headaches.      NOCTURIA:  - Addressed nocturia as potential cause of sleep disruption.  - Explained importance of limiting fluid intake before bedtime to reduce nocturia.  - Ms. Campos to decrease liquid intake at night, stopping around 8:00 PM if aiming to sleep at 10:00 PM.    ANXIETY DISORDER:  - Continued daytime lorazepam (Ativan) as previously prescribed.    ABNORMAL LUNG FINDING:  - Noted recent ER visit findings of lung spots and upcoming oncology workup.    FOLLOW-UP:  - Follow up with Dr. Zhang on the 31st as scheduled.  - Report effectiveness of amitriptyline to Dr. Noriega at next appointment.  - Contact office if any issues arise before next appointment with Dr. Zhang            Care Plan/Goals: Reviewed    Goals         Blood Pressure < 140/90 (pt-stated)             Follow up: No follow-ups on file.    After visit summary was printed and given to patient upon discharge today.  Patient goals and care plan are included in After Visit Summary.

## 2024-12-21 RX ORDER — DEXTROSE 4 G
TABLET,CHEWABLE ORAL
Qty: 1 EACH | Refills: 0 | Status: SHIPPED | OUTPATIENT
Start: 2024-12-21 | End: 2025-12-17

## 2024-12-21 RX ORDER — OMEPRAZOLE 20 MG/1
20 CAPSULE, DELAYED RELEASE ORAL DAILY
Qty: 90 CAPSULE | Refills: 2 | Status: SHIPPED | OUTPATIENT
Start: 2024-12-21

## 2024-12-21 RX ORDER — METOPROLOL SUCCINATE 50 MG/1
50 TABLET, EXTENDED RELEASE ORAL DAILY
Qty: 90 TABLET | Refills: 2 | Status: SHIPPED | OUTPATIENT
Start: 2024-12-21

## 2024-12-21 RX ORDER — LANCETS
EACH MISCELLANEOUS
Qty: 200 EACH | Refills: 3 | Status: SHIPPED | OUTPATIENT
Start: 2024-12-21

## 2024-12-23 ENCOUNTER — HOSPITAL ENCOUNTER (OUTPATIENT)
Dept: RADIOLOGY | Facility: HOSPITAL | Age: 74
Discharge: HOME | End: 2024-12-23
Attending: THORACIC SURGERY (CARDIOTHORACIC VASCULAR SURGERY)
Payer: MEDICARE

## 2024-12-23 DIAGNOSIS — C34.32 MALIGNANT NEOPLASM OF LOWER LOBE OF LEFT LUNG: ICD-10-CM

## 2024-12-23 PROCEDURE — A9552 F18 FDG: HCPCS | Performed by: THORACIC SURGERY (CARDIOTHORACIC VASCULAR SURGERY)

## 2024-12-23 PROCEDURE — 78815 PET IMAGE W/CT SKULL-THIGH: CPT | Mod: TC

## 2024-12-23 PROCEDURE — 78815 PET IMAGE W/CT SKULL-THIGH: CPT | Mod: 26,PS,, | Performed by: RADIOLOGY

## 2024-12-23 RX ORDER — FLUDEOXYGLUCOSE F18 500 MCI/ML
12.28 INJECTION INTRAVENOUS
Status: COMPLETED | OUTPATIENT
Start: 2024-12-23 | End: 2024-12-23

## 2024-12-23 RX ADMIN — FLUDEOXYGLUCOSE F-18 12.28 MILLICURIE: 500 INJECTION INTRAVENOUS at 07:12

## 2024-12-24 ENCOUNTER — PATIENT MESSAGE (OUTPATIENT)
Dept: HEMATOLOGY/ONCOLOGY | Facility: CLINIC | Age: 74
End: 2024-12-24

## 2024-12-24 ENCOUNTER — OFFICE VISIT (OUTPATIENT)
Dept: HEMATOLOGY/ONCOLOGY | Facility: CLINIC | Age: 74
End: 2024-12-24
Payer: MEDICARE

## 2024-12-24 VITALS
SYSTOLIC BLOOD PRESSURE: 96 MMHG | DIASTOLIC BLOOD PRESSURE: 62 MMHG | TEMPERATURE: 97 F | BODY MASS INDEX: 25.7 KG/M2 | HEIGHT: 63 IN | HEART RATE: 100 BPM | WEIGHT: 145.06 LBS | OXYGEN SATURATION: 97 %

## 2024-12-24 DIAGNOSIS — C34.92 ADENOCARCINOMA OF LEFT LUNG: Primary | ICD-10-CM

## 2024-12-24 DIAGNOSIS — C34.32 MALIGNANT NEOPLASM OF LOWER LOBE OF LEFT LUNG: ICD-10-CM

## 2024-12-24 DIAGNOSIS — R93.5 ABNORMAL CT OF THE ABDOMEN: ICD-10-CM

## 2024-12-24 PROCEDURE — 3060F POS MICROALBUMINURIA REV: CPT | Mod: CPTII,S$GLB,, | Performed by: INTERNAL MEDICINE

## 2024-12-24 PROCEDURE — 1160F RVW MEDS BY RX/DR IN RCRD: CPT | Mod: CPTII,S$GLB,, | Performed by: INTERNAL MEDICINE

## 2024-12-24 PROCEDURE — 3074F SYST BP LT 130 MM HG: CPT | Mod: CPTII,S$GLB,, | Performed by: INTERNAL MEDICINE

## 2024-12-24 PROCEDURE — 3044F HG A1C LEVEL LT 7.0%: CPT | Mod: CPTII,S$GLB,, | Performed by: INTERNAL MEDICINE

## 2024-12-24 PROCEDURE — 99205 OFFICE O/P NEW HI 60 MIN: CPT | Mod: S$GLB,,, | Performed by: INTERNAL MEDICINE

## 2024-12-24 PROCEDURE — 3066F NEPHROPATHY DOC TX: CPT | Mod: CPTII,S$GLB,, | Performed by: INTERNAL MEDICINE

## 2024-12-24 PROCEDURE — 1101F PT FALLS ASSESS-DOCD LE1/YR: CPT | Mod: CPTII,S$GLB,, | Performed by: INTERNAL MEDICINE

## 2024-12-24 PROCEDURE — 99999 PR PBB SHADOW E&M-EST. PATIENT-LVL IV: CPT | Mod: PBBFAC,,, | Performed by: INTERNAL MEDICINE

## 2024-12-24 PROCEDURE — 3288F FALL RISK ASSESSMENT DOCD: CPT | Mod: CPTII,S$GLB,, | Performed by: INTERNAL MEDICINE

## 2024-12-24 PROCEDURE — 4010F ACE/ARB THERAPY RXD/TAKEN: CPT | Mod: CPTII,S$GLB,, | Performed by: INTERNAL MEDICINE

## 2024-12-24 PROCEDURE — 3078F DIAST BP <80 MM HG: CPT | Mod: CPTII,S$GLB,, | Performed by: INTERNAL MEDICINE

## 2024-12-24 PROCEDURE — 1159F MED LIST DOCD IN RCRD: CPT | Mod: CPTII,S$GLB,, | Performed by: INTERNAL MEDICINE

## 2024-12-24 PROCEDURE — 1125F AMNT PAIN NOTED PAIN PRSNT: CPT | Mod: CPTII,S$GLB,, | Performed by: INTERNAL MEDICINE

## 2024-12-24 NOTE — PROGRESS NOTES
Subjective:       Patient ID: Cassandra Campos is a 74 y.o. female.    Chief Complaint: Results and Lung Cancer    HPI:  74-year-old female with resected stage I left lingula adenocarcinoma.  By robotic surgery.  Patient on route teen follow-up imaging demonstrates a left hilar mass.  PET positive with no other signs of metastatic disease was asked to see the patient for further consideration ECOG status 2    Past Medical History:   Diagnosis Date    Arthritis     hands    Bilateral bunions     Borderline glaucoma     De Quervain's disease (radial styloid tenosynovitis)     Gastritis     upper GI 2017    Hydradenitis     Hyperlipidemia     Hypertension     Insomnia     Migraines 2000    Nasal septum perforation     Obesity     Pneumonia     Restrictive airway disease     Sleep apnea     SVT (supraventricular tachycardia) 2013    Trigger finger     Type 2 diabetes mellitus      am 2024     Family History   Problem Relation Name Age of Onset    Prostate cancer Brother      Diabetes Maternal Aunt Alondra Campos     Diabetes Cousin      Hypertension Maternal Grandmother Portia Kwesi      Social History     Socioeconomic History    Marital status:     Number of children: 1   Occupational History    Occupation:  aid   Tobacco Use    Smoking status: Former     Current packs/day: 0.00     Average packs/day: 0.5 packs/day for 42.0 years (21.0 ttl pk-yrs)     Types: Cigarettes     Start date: 1970     Quit date: 2012     Years since quittin.9     Passive exposure: Past    Smokeless tobacco: Never   Substance and Sexual Activity    Alcohol use: Not Currently     Alcohol/week: 1.0 standard drink of alcohol     Types: 1 Glasses of wine per week     Comment: Glass red wine once a every 2 weeks    Drug use: Never    Sexual activity: Not Currently     Partners: Female     Birth control/protection: Abstinence, None   Social History Narrative    Single, part-time  teacher. Masters degree biology.      Social Drivers of Health     Financial Resource Strain: Patient Declined (2024)    Overall Financial Resource Strain (CARDIA)     Difficulty of Paying Living Expenses: Patient declined   Food Insecurity: Patient Declined (2024)    Hunger Vital Sign     Worried About Running Out of Food in the Last Year: Patient declined     Ran Out of Food in the Last Year: Patient declined   Transportation Needs: No Transportation Needs (2023)    PRAPARE - Transportation     Lack of Transportation (Medical): No     Lack of Transportation (Non-Medical): No   Physical Activity: Insufficiently Active (2024)    Exercise Vital Sign     Days of Exercise per Week: 3 days     Minutes of Exercise per Session: 20 min   Stress: Stress Concern Present (2024)    [employer] [place] - Occupational Stress Questionnaire     Feeling of Stress : Very much   Housing Stability: High Risk (2024)    Housing Stability Vital Sign     Unable to Pay for Housing in the Last Year: Yes     Past Surgical History:   Procedure Laterality Date    AXILLARY HIDRADENITIS EXCISION Bilateral     BONE EXOSTOSIS EXCISION Right 2018    Procedure: EXCISION, EXOSTOSIS;  Surgeon: Jayro Pedraza Sr., MD;  Location: [facility] OR;  Service: Orthopedics;  Laterality: Right;    BREAST BIOPSY Bilateral     both benign    BREAST SURGERY  1998    CARPAL TUNNEL RELEASE      bilateral    CARPAL TUNNEL RELEASE Right 2024    Procedure: RELEASE, CARPAL TUNNEL;  Surgeon: Jaime Oswald MD;  Location: [facility] OR;  Service: Orthopedics;  Laterality: Right;    CARPAL TUNNEL RELEASE Left 2024    Procedure: RELEASE, CARPAL TUNNEL;  Surgeon: Jaime Oswald MD;  Location: [facility] OR;  Service: Orthopedics;  Laterality: Left;    CATARACT EXTRACTION Bilateral     OU     SECTION  1979    CHOLECYSTECTOMY  2014    COLONOSCOPY N/A 10/02/2020    Procedure: COLONOSCOPY;   Surgeon: Tushar Edwards MD;  Location: [facility];  Service: Endoscopy;  Laterality: N/A;    COLONOSCOPY W/ POLYPECTOMY  10/02/2020    Polyps x3, repeat 5 years; Tushar Edwards MD     CYST REMOVAL  07/2015    sebaceous cyst removed from face    DE QUERVAIN'S RELEASE Left 01/16/2020    Procedure: RELEASE, HAND, FOR DEQUERVAIN'S TENOSYNOVITIS;  Surgeon: Jaime Oswald MD;  Location: [facility] OR;  Service: Orthopedics;  Laterality: Left;    DE QUERVAIN'S RELEASE Right 11/20/2020    Procedure: RELEASE, HAND, FOR DEQUERVAIN'S TENOSYNOVITIS;  Surgeon: Jaime Oswald MD;  Location: [facility];  Service: Orthopedics;  Laterality: Right;    ENDOBRONCHIAL ULTRASOUND Bilateral 04/10/2024    Procedure: ENDOBRONCHIAL ULTRASOUND (EBUS);  Surgeon: Adrian Robin MD;  Location: [facility];  Service: Pulmonary;  Laterality: Bilateral;    EYE SURGERY      gastric sleeve  02/13/2017    Dr. Watson    INJECTION OF ANESTHETIC AGENT AROUND MULTIPLE INTERCOSTAL NERVES  5/10/2024    Procedure: BLOCK, NERVE, INTERCOSTAL, 2 OR MORE;  Surgeon: Mike Nuñez MD;  Location: [facility];  Service: Cardiothoracic;;    KNEE SURGERY Right     OLECRANON BURSECTOMY Right 07/25/2018    Procedure: BURSECTOMY, OLECRANON;  Surgeon: Jayro Pedraza Sr., MD;  Location: [facility];  Service: Orthopedics;  Laterality: Right;    SURGICAL REMOVAL OF BUNION WITH OSTEOTOMY OF METATARSAL BONE Left 05/10/2019    Procedure: BUNIONECTOMY, WITH METATARSAL OSTEOTOMY;  Surgeon: Srinivasan Villanueva DPM;  Location: [facility];  Service: Podiatry;  Laterality: Left;    SURGICAL REMOVAL OF BUNION WITH OSTEOTOMY OF METATARSAL BONE Right 06/28/2019    Procedure: BUNIONECTOMY, WITH METATARSAL OSTEOTOMY;  Surgeon: Srinivasan Villanueva DPM;  Location: [facility];  Service: Podiatry;  Laterality: Right;    SURGICAL REMOVAL OF LYMPH NODE Left 5/10/2024    Procedure: EXCISION, LYMPH NODE;  Surgeon: Mike Nuñez MD;  Location: [facility];  Service: Cardiothoracic;  Laterality: Left;     TONSILLECTOMY, ADENOIDECTOMY  1980s    TRANSESOPHAGEAL ECHOCARDIOGRAM WITH POSSIBLE CARDIOVERSION (LAKESHIA W/ POSS CARDIOVERSION) N/A 5/15/2024    Procedure: Transesophageal echo (LAKESHIA) intra-procedure log documentation;  Surgeon: Twan Pelaez MD;  Location: [facility] CATH LAB;  Service: Cardiology;  Laterality: N/A;    TRIGGER FINGER RELEASE Right 04/01/2015    Dr. Milo HALL ROBOTIC RATS,WITH LOBECTOMY,LUNG Left 5/10/2024    Procedure: XI ROBOTIC RATS,WITH LOBECTOMY,LUNG;  Surgeon: Mike Nuñez MD;  Location: [facility] OR;  Service: Cardiothoracic;  Laterality: Left;  Lingulectomy       Labs:  Lab Results   Component Value Date    WBC 5.21 12/11/2024    HGB 12.6 12/11/2024    HCT 38.5 12/11/2024    MCV 71 (L) 12/11/2024     12/11/2024     BMP  Lab Results   Component Value Date     12/11/2024    K 3.7 12/11/2024     12/11/2024    CO2 22 (L) 12/11/2024    BUN 5 (L) 12/11/2024    CREATININE 0.6 12/11/2024    CALCIUM 9.4 12/11/2024    ANIONGAP 10 12/11/2024    ESTGFRAFRICA >60 06/21/2022    EGFRNONAA >60 06/21/2022     Lab Results   Component Value Date    ALT 19 12/11/2024    AST 14 12/11/2024    ALKPHOS 96 12/11/2024    BILITOT 0.3 12/11/2024       Lab Results   Component Value Date    IRON 112 09/12/2022    TIBC 448 11/28/2016    FERRITIN 35 09/12/2022     Lab Results   Component Value Date    SDRFEXXV65 1440 (H) 08/14/2018     Lab Results   Component Value Date    FOLATE 15.5 11/28/2016     Lab Results   Component Value Date    TSH 3.220 09/12/2022         Review of Systems   Constitutional:  Positive for fatigue. Negative for activity change, appetite change, chills, diaphoresis, fever and unexpected weight change.   HENT:  Negative for congestion, dental problem, drooling, ear discharge, ear pain, facial swelling, hearing loss, mouth sores, nosebleeds, postnasal drip, rhinorrhea, sinus pressure, sneezing, sore throat, tinnitus, trouble swallowing and voice change.    Eyes:  Negative for  photophobia, pain, discharge, redness, itching and visual disturbance.   Respiratory:  Negative for cough, choking, chest tightness, shortness of breath, wheezing and stridor.    Cardiovascular:  Negative for chest pain, palpitations and leg swelling.   Gastrointestinal:  Negative for abdominal distention, abdominal pain, anal bleeding, blood in stool, constipation, diarrhea, nausea, rectal pain and vomiting.   Endocrine: Negative for cold intolerance, heat intolerance, polydipsia, polyphagia and polyuria.   Genitourinary:  Negative for decreased urine volume, difficulty urinating, dyspareunia, dysuria, enuresis, flank pain, frequency, genital sores, hematuria, menstrual problem, pelvic pain, urgency, vaginal bleeding, vaginal discharge and vaginal pain.   Musculoskeletal:  Positive for arthralgias, gait problem and myalgias. Negative for back pain, joint swelling, neck pain and neck stiffness.   Skin:  Negative for color change, pallor and rash.   Allergic/Immunologic: Negative for environmental allergies, food allergies and immunocompromised state.   Neurological:  Positive for weakness. Negative for dizziness, tremors, seizures, syncope, facial asymmetry, speech difficulty, light-headedness, numbness and headaches.   Hematological:  Negative for adenopathy. Does not bruise/bleed easily.   Psychiatric/Behavioral:  Positive for dysphoric mood. Negative for agitation, behavioral problems, confusion, decreased concentration, hallucinations, self-injury, sleep disturbance and suicidal ideas. The patient is nervous/anxious. The patient is not hyperactive.        Objective:      Physical Exam  Vitals reviewed.   Constitutional:       General: She is not in acute distress.     Appearance: She is well-developed. She is not diaphoretic.   HENT:      Head: Normocephalic and atraumatic.      Right Ear: External ear normal.      Left Ear: External ear normal.      Nose: Nose normal.      Right Sinus: No maxillary sinus  tenderness or frontal sinus tenderness.      Left Sinus: No maxillary sinus tenderness or frontal sinus tenderness.      Mouth/Throat:      Pharynx: No oropharyngeal exudate.   Eyes:      General: Lids are normal. No scleral icterus.        Right eye: No discharge.         Left eye: No discharge.      Conjunctiva/sclera: Conjunctivae normal.      Right eye: Right conjunctiva is not injected. No hemorrhage.     Left eye: Left conjunctiva is not injected. No hemorrhage.     Pupils: Pupils are equal, round, and reactive to light.   Neck:      Thyroid: No thyromegaly.      Vascular: No JVD.      Trachea: No tracheal deviation.   Cardiovascular:      Rate and Rhythm: Normal rate.      Heart sounds: Normal heart sounds.   Pulmonary:      Effort: Pulmonary effort is normal. No respiratory distress.      Breath sounds: No stridor.   Chest:      Chest wall: No tenderness.   Abdominal:      General: Bowel sounds are normal. There is no distension.      Palpations: Abdomen is soft. There is no hepatomegaly, splenomegaly or mass.      Tenderness: There is no abdominal tenderness. There is no rebound.   Musculoskeletal:         General: No tenderness. Normal range of motion.      Cervical back: Normal range of motion and neck supple.   Lymphadenopathy:      Cervical: No cervical adenopathy.      Upper Body:      Right upper body: No supraclavicular adenopathy.      Left upper body: No supraclavicular adenopathy.   Skin:     General: Skin is dry.      Findings: No erythema or rash.   Neurological:      Mental Status: She is alert and oriented to person, place, and time.      Cranial Nerves: No cranial nerve deficit.      Motor: Weakness present.      Coordination: Coordination abnormal.      Gait: Gait abnormal.   Psychiatric:         Behavior: Behavior normal.         Thought Content: Thought content normal.         Judgment: Judgment normal.             Assessment:      1. Adenocarcinoma of left lung    2. Abnormal CT of the  abdomen    3. Malignant neoplasm of lower lobe of left lung           Med Onc Chart Routing      Follow up with physician . Nurse navigation to coordinate   Follow up with [facility]    Infusion scheduling note    Injection scheduling note    Labs    Imaging MRI   MRI brain   Pharmacy appointment    Other referrals         Needs to see Pulmonary Medicine ASAP as well as Radiation Oncology              Plan:     Robotic resection of left lung mass.  With new finding in left hilar.  Would recommend Pulmonary consultation for bronchoscopic evaluation and biopsy.  Discussed possibility of treatment with concurrent chemoradiation.  But would like to present at multidisciplinary conference.  For consideration but need to establish diagnosis patient is concerned whether not she has small cell lung carcinoma or adenocarcinoma variant previous history of tobacco use.  MRI brain ordered as well.  Discussed above        Emerson Moran Jr, MD FACP

## 2024-12-26 ENCOUNTER — TELEPHONE (OUTPATIENT)
Dept: RADIATION ONCOLOGY | Facility: CLINIC | Age: 74
End: 2024-12-26
Payer: MEDICARE

## 2024-12-26 ENCOUNTER — OFFICE VISIT (OUTPATIENT)
Dept: CARDIOTHORACIC SURGERY | Facility: CLINIC | Age: 74
End: 2024-12-26
Payer: MEDICARE

## 2024-12-26 ENCOUNTER — TELEPHONE (OUTPATIENT)
Dept: HEMATOLOGY/ONCOLOGY | Facility: CLINIC | Age: 74
End: 2024-12-26
Payer: MEDICARE

## 2024-12-26 VITALS
DIASTOLIC BLOOD PRESSURE: 78 MMHG | HEART RATE: 104 BPM | SYSTOLIC BLOOD PRESSURE: 122 MMHG | BODY MASS INDEX: 25.77 KG/M2 | WEIGHT: 145.5 LBS

## 2024-12-26 DIAGNOSIS — E11.69 HYPERLIPIDEMIA ASSOCIATED WITH TYPE 2 DIABETES MELLITUS: ICD-10-CM

## 2024-12-26 DIAGNOSIS — I10 PRIMARY HYPERTENSION: ICD-10-CM

## 2024-12-26 DIAGNOSIS — E11.9 CONTROLLED TYPE 2 DIABETES MELLITUS WITHOUT COMPLICATION, WITHOUT LONG-TERM CURRENT USE OF INSULIN: ICD-10-CM

## 2024-12-26 DIAGNOSIS — I47.10 PAROXYSMAL SVT (SUPRAVENTRICULAR TACHYCARDIA): ICD-10-CM

## 2024-12-26 DIAGNOSIS — E78.5 HYPERLIPIDEMIA ASSOCIATED WITH TYPE 2 DIABETES MELLITUS: ICD-10-CM

## 2024-12-26 DIAGNOSIS — F32.0 MAJOR DEPRESSIVE DISORDER, SINGLE EPISODE, MILD: ICD-10-CM

## 2024-12-26 DIAGNOSIS — R91.1 SOLITARY PULMONARY NODULE: ICD-10-CM

## 2024-12-26 DIAGNOSIS — C34.32 MALIGNANT NEOPLASM OF LOWER LOBE OF LEFT LUNG: Primary | ICD-10-CM

## 2024-12-26 PROCEDURE — 99999 PR PBB SHADOW E&M-EST. PATIENT-LVL IV: CPT | Mod: PBBFAC,,, | Performed by: THORACIC SURGERY (CARDIOTHORACIC VASCULAR SURGERY)

## 2024-12-26 NOTE — TELEPHONE ENCOUNTER
Attempted to call patient to schedule rad onc consult. There was no answer & the mailbox is full, unable to leave a message.

## 2024-12-26 NOTE — PROGRESS NOTES
Subjective:      Patient ID: Cassandra Campos is a 74 y.o. female.    Chief Complaint: Results    HPI:  74 year-old female ex-smoker I had a adenocarcinoma T1 a N0 M0 status post robotic assisted lingual lobectomy and mediastinal lymph node dissection here for the follow-up visit .  THE PATIENT HAD A PET SCAN RECENTLY WHICH SHOWED INCREASE IN ACTIVITY WITH A HILAR LYMPH NODE ON THE LEFT SIDE WITHOUT ANY CT SCAN EVIDENCE OF LOCAL RECURRENCE AT THE RESECTION SITE, DOES NOT HAVE ANY COUGH OR HEMOPTYSIS.    Review of patient's allergies indicates:  No Known Allergies    Review of Systems   Constitutional:  Negative for activity change and appetite change.   HENT:  Negative for dental problem, nosebleeds and sore throat.    Eyes:  Negative for discharge and visual disturbance.   Respiratory:  Negative for cough, chest tightness and stridor.    Cardiovascular:  Negative for leg swelling.   Gastrointestinal:  Negative for abdominal distention and abdominal pain.   Genitourinary:  Negative for difficulty urinating and dysuria.   Musculoskeletal:  Negative for arthralgias, back pain and joint swelling.   Allergic/Immunologic: Negative for environmental allergies.   Neurological:  Negative for dizziness, syncope and headaches.   Hematological:  Does not bruise/bleed easily.   Psychiatric/Behavioral:  Negative for behavioral problems.           Objective:   /78 (BP Location: Right arm, Patient Position: Sitting)   Pulse 104   Wt 66 kg (145 lb 8.1 oz)   BMI 25.77 kg/m²     NM PET CT FDG Skull Base to Mid Thigh  Narrative: EXAMINATION:  NM PET CT FDG SKULL BASE TO MID THIGH    CLINICAL HISTORY:  Lung nodule, > 8mm;  Malignant neoplasm of lower lobe, left bronchus or lung    TECHNIQUE:  Segmented attenuation corrected 3-D PET imaging was obtained from the skull base through the mid thighs utilizing 12.28 mCi F-18-FDG.  Glucose level 76 mg/dL noncontrast CT imaging was performed for attenuation correction, diagnosis,  and anatomical fusion with PET    COMPARISON:  None.    FINDINGS:  Head/neck: There is normal physiologic uptake noted within the visualized brain parenchyma. No FDG avid adenopathy within the neck.    Chest: FDG avid left hilar adenopathy/mass now demonstrating an SUV max of 9.4 as compared to 4.7 on the prior exam.  The soft tissue mass component also appears more prominent in size when compared to the prior exam and measures approximately 1.9 by 1.4 cm in size as compared to approximately 1.3 x 0.9 cm in size on the prior study..  The mass within the lingula appears to have been resected in the interval.  Equivocal subscapular soft tissue density with FDG uptake seen on the left demonstrate an SUV max of 4.5.  Possibilities include metastatic disease versus friction related uptake/inflammation and elastofibroma dorsi.  Benign etiology favored.  There is also some low level uptake in a similar region on the right without a obvious soft tissue mass.    Abdomen/Pelvis: Normal physiologic uptake noted within the liver, spleen, urinary tract, and bowel.The adrenals are unremarkable in appearance.  There is evidence for prior cholecystectomy.    Skeletal: There appears to be degenerative type uptake seen in the region of either shoulder and the bilateral hips and at a few levels along the facet joints of the cervical spine and to a lesser degree the lumbar spine.  Impression: 1. Interval resection of the previously noted lingular mass with no evidence to suggest recurrent or residual disease at the postsurgical site.  2. FDG avid left hilar adenopathy/mass, increased in size and FDG uptake when compared to the prior exam most consistent with disease progression.  3. Remaining findings as discussed above.    Electronically signed by: Solomon Pennington DO  Date:    12/23/2024  Time:    10:20         Physical Exam  Vitals reviewed.   Constitutional:       Appearance: Normal appearance.   HENT:      Head: Normocephalic and  atraumatic.      Mouth/Throat:      Mouth: Mucous membranes are moist.   Eyes:      Extraocular Movements: Extraocular movements intact.   Cardiovascular:      Rate and Rhythm: Normal rate and regular rhythm.      Pulses: Normal pulses.      Heart sounds: Normal heart sounds.   Pulmonary:      Effort: Pulmonary effort is normal.      Breath sounds: Normal breath sounds.   Abdominal:      Palpations: Abdomen is soft.   Musculoskeletal:         General: Normal range of motion.      Cervical back: Normal range of motion and neck supple.   Skin:     General: Skin is warm.      Capillary Refill: Capillary refill takes less than 2 seconds.   Neurological:      General: No focal deficit present.      Mental Status: She is alert and oriented to person, place, and time.   Psychiatric:         Mood and Affect: Mood normal.         Assessment:     1. Malignant neoplasm of lower lobe of left lung    2. Solitary pulmonary nodule    3. Hyperlipidemia associated with type 2 diabetes mellitus    4. Controlled type 2 diabetes mellitus without complication, without long-term current use of insulin    5. Paroxysmal SVT (supraventricular tachycardia)    6. Major depressive disorder, single episode, mild    7. Primary hypertension          Plan   74-YEAR-OLD FEMALE WITH A HISTORY OF ADENOCARCINOMA OF THE LINGULA RESECTED WITH ROBOTIC ASSISTED SURGERY IN THE FOLLOW UP.  DEVELOPED LEFT HILAR ADENOPATHY WITH THE INCREASED PET ACTIVITY WE WILL REFER THE PATIENT TO PULMONOLOGY FOR FURTHER WORKUP AND EBUS BIOPSY OF THE LYMPH NODE AND THE PATIENT WE WILL BE DISCUSSED AT THE MULTIDISCIPLINARY TUMOR CONFERENCE WITH THE RESULTS FOR FURTHER TREATMENT PLANS WHICH INCLUDES CHEMORADIATION.          Mike Nuñez MD  [facility]  [place]

## 2024-12-26 NOTE — TELEPHONE ENCOUNTER
SW attempted to reach pt to assess needs per provider consult. No answer/vm full. SW will remain available.

## 2024-12-27 ENCOUNTER — TUMOR BOARD CONFERENCE (OUTPATIENT)
Dept: HEMATOLOGY/ONCOLOGY | Facility: CLINIC | Age: 74
End: 2024-12-27
Payer: MEDICARE

## 2024-12-27 NOTE — PROGRESS NOTES
Tumor Board Documentation      Cassandra Campos was presented by Emerson Moran MD at our Tumor Board on 12/27/2024, which included representatives from Medical Oncology, Hematology, Surgical Oncology, Pathology, Navigation, Genetics, Radiology, Gastrointestinal (CTS, Gen Surg, PCP).    Cassandra currently presents as a current patient with Lung cancer, with history of the following treatments: Surgical Intervention(s).    Additionally, we reviewed previous medical and familial history, history of present illness, and recent lab results along with all available histopathologic and imaging studies. The tumor board considered available treatment options and made the following recommendations:  Biopsy f/u with Pulm ASAP for possible EBUS.       The following procedures/referrals were also placed: No orders of the defined types were placed in this encounter.      Clinical Trial Status: Not discussed     National site-specific guidelines were discussed with respect to the case.    Tumor board is a meeting of clinicians from various specialty areas who evaluate and discuss patients for whom a multidisciplinary approach is being considered. Final determinations in the plan of care are those of the provider(s). The responsibility for follow up of recommendations given during tumor board is that of the provider.     Emerson Moran MD

## 2024-12-30 ENCOUNTER — OFFICE VISIT (OUTPATIENT)
Dept: PULMONOLOGY | Facility: CLINIC | Age: 74
End: 2024-12-30
Payer: MEDICARE

## 2024-12-30 ENCOUNTER — HOSPITAL ENCOUNTER (OUTPATIENT)
Dept: CARDIOLOGY | Facility: HOSPITAL | Age: 74
Discharge: HOME | End: 2024-12-30
Attending: INTERNAL MEDICINE
Payer: MEDICARE

## 2024-12-30 VITALS
OXYGEN SATURATION: 95 % | WEIGHT: 144.75 LBS | HEART RATE: 95 BPM | DIASTOLIC BLOOD PRESSURE: 62 MMHG | BODY MASS INDEX: 25.65 KG/M2 | SYSTOLIC BLOOD PRESSURE: 116 MMHG | RESPIRATION RATE: 20 BRPM | HEIGHT: 63 IN

## 2024-12-30 DIAGNOSIS — R94.8 ABNORMAL PET SCAN, MEDIASTINUM: Primary | ICD-10-CM

## 2024-12-30 DIAGNOSIS — I10 PRIMARY HYPERTENSION: ICD-10-CM

## 2024-12-30 DIAGNOSIS — R94.8 ABNORMAL PET SCAN, MEDIASTINUM: ICD-10-CM

## 2024-12-30 DIAGNOSIS — C34.92 ADENOCARCINOMA OF LEFT LUNG: ICD-10-CM

## 2024-12-30 DIAGNOSIS — C34.32 MALIGNANT NEOPLASM OF LOWER LOBE OF LEFT LUNG: ICD-10-CM

## 2024-12-30 DIAGNOSIS — J45.20 MILD INTERMITTENT ASTHMA WITHOUT COMPLICATION: ICD-10-CM

## 2024-12-30 DIAGNOSIS — R59.0 HILAR ADENOPATHY: ICD-10-CM

## 2024-12-30 DIAGNOSIS — R91.1 SOLITARY PULMONARY NODULE: ICD-10-CM

## 2024-12-30 DIAGNOSIS — R93.5 ABNORMAL CT OF THE ABDOMEN: ICD-10-CM

## 2024-12-30 LAB
OHS QRS DURATION: 82 MS
OHS QTC CALCULATION: 444 MS

## 2024-12-30 PROCEDURE — 93010 ELECTROCARDIOGRAM REPORT: CPT | Mod: ,,, | Performed by: INTERNAL MEDICINE

## 2024-12-30 PROCEDURE — 1101F PT FALLS ASSESS-DOCD LE1/YR: CPT | Mod: CPTII,S$GLB,, | Performed by: INTERNAL MEDICINE

## 2024-12-30 PROCEDURE — 1159F MED LIST DOCD IN RCRD: CPT | Mod: CPTII,S$GLB,, | Performed by: INTERNAL MEDICINE

## 2024-12-30 PROCEDURE — 3044F HG A1C LEVEL LT 7.0%: CPT | Mod: CPTII,S$GLB,, | Performed by: INTERNAL MEDICINE

## 2024-12-30 PROCEDURE — 3078F DIAST BP <80 MM HG: CPT | Mod: CPTII,S$GLB,, | Performed by: INTERNAL MEDICINE

## 2024-12-30 PROCEDURE — 3060F POS MICROALBUMINURIA REV: CPT | Mod: CPTII,S$GLB,, | Performed by: INTERNAL MEDICINE

## 2024-12-30 PROCEDURE — 3066F NEPHROPATHY DOC TX: CPT | Mod: CPTII,S$GLB,, | Performed by: INTERNAL MEDICINE

## 2024-12-30 PROCEDURE — 3288F FALL RISK ASSESSMENT DOCD: CPT | Mod: CPTII,S$GLB,, | Performed by: INTERNAL MEDICINE

## 2024-12-30 PROCEDURE — 1160F RVW MEDS BY RX/DR IN RCRD: CPT | Mod: CPTII,S$GLB,, | Performed by: INTERNAL MEDICINE

## 2024-12-30 PROCEDURE — 4010F ACE/ARB THERAPY RXD/TAKEN: CPT | Mod: CPTII,S$GLB,, | Performed by: INTERNAL MEDICINE

## 2024-12-30 PROCEDURE — 3074F SYST BP LT 130 MM HG: CPT | Mod: CPTII,S$GLB,, | Performed by: INTERNAL MEDICINE

## 2024-12-30 PROCEDURE — 1125F AMNT PAIN NOTED PAIN PRSNT: CPT | Mod: CPTII,S$GLB,, | Performed by: INTERNAL MEDICINE

## 2024-12-30 PROCEDURE — 99214 OFFICE O/P EST MOD 30 MIN: CPT | Mod: S$GLB,,, | Performed by: INTERNAL MEDICINE

## 2024-12-30 PROCEDURE — 93005 ELECTROCARDIOGRAM TRACING: CPT

## 2024-12-30 PROCEDURE — 99999 PR PBB SHADOW E&M-EST. PATIENT-LVL V: CPT | Mod: PBBFAC,,, | Performed by: INTERNAL MEDICINE

## 2024-12-30 RX ORDER — LIDOCAINE HYDROCHLORIDE 10 MG/ML
20 INJECTION, SOLUTION INFILTRATION; PERINEURAL ONCE
OUTPATIENT
Start: 2024-12-30 | End: 2024-12-30

## 2024-12-30 RX ORDER — SODIUM CHLORIDE 9 MG/ML
INJECTION, SOLUTION INTRAVENOUS CONTINUOUS
OUTPATIENT
Start: 2024-12-30

## 2024-12-30 NOTE — ASSESSMENT & PLAN NOTE
FDG avid left hilar adenopathy/mass now demonstrating an SUV max of 9.4 as compared to 4.7 on the prior exam.

## 2024-12-30 NOTE — PATIENT INSTRUCTIONS
PRE-Procedure INSTRUCTIONS   *Arrive at     am / pm (2 hours before your scheduled procedure time). This arrival time will allow us time to prepare you for your  procedure on (day)  At(time)   On(date)   Check in at Endoscopy Lab desk on the 5th Floor [facility] [address] ([address] [place] [address]). You DO NOT check in on the first floor for this procedure.   Please read the following instructions carefully before your appointment.   ALL PATIENTS:   Plan to be at the hospital for approximately 4-6 hours.   You must have a responsible person who can drive you to the hospital, stay while the procedure is being done, assume responsibility for your care at discharge, and drive you home. If not, your exam will be canceled.   Please leave all valuables at home, including jewelry. You will need to bring your s license and medical insurance card.   Also, you will be responsible for any co-payment at time of registration.   You will be sedated for the procedure. Due to the sedation you will not be able to operate a vehicle or sign any legal documentation for 24 hours after exam.   Wear loose and comfortable clothing and wear socks if you are cold natured.   Bring all medications (in original containers) that you are currently taking, or a complete list.   To prepare for this test, you MAY NOT have anything to eat or drink after midnight, not even water, unless you take any necessary medications as listed in # 1 below and then a sip of water with those medications is allowed.   1 - If you take BLOOD PRESSURE, HEART, SEIZURE or PSYCHIATRIC MEDICATIONS in the morning, please take them the morning of your procedure.   Please take these medications as soon as you awaken with a small sip of water ... please make sure they are taken before you leave to come to the hospital for your procedure.   On the day before your procedure, take all of your regular  scheduled medications except for the blood thinning medications discussed below.   2 - If you are DIABETIC:   Check blood sugar levels on the morning of the exam and/or as needed if you feel hypoglycemic (low blood sugar).   DO NOT TAKE any diabetic medications (including insulin) the morning of the exam.   If your blood sugar goes below 70, you may drink 4 ounces of juice, soda or eat a piece of hard candy. Wait 15 minutes then recheck your blood sugar. If it isn't going up, you may drink another 4 ounces and contact your Primary Care doctor or our office. Please do not have any liquids within 2 hours of your arrival time.   3 - STOP BLOOD THINNING MEDICATION, Aspirin, Ibuprofen, Naproxen, etc as listed below:   Coumadin, Plavix, Aspirin, NSAIDs (nonsteroidal anti-inflammatory drugs)  and fish oil.   If on Coumadin or Plavix, notify the prescribing physician that it is being temporarily discontinued for procedure.   Coumadin must be stopped 3 days prior to exam.   Plavix, Aspirin and NSAIDs (see above) must be stopped 7 DAYS PRIOR TO EXAM.   If you are on a blood thinner not listed, please contact your physician about stop times. Such as Aggrenox, Brilinta, Effient, Pradaxa, Xaralto, etc.   Avoid Smoking for 24 hours prior to the test!   Endoscopy Pre-Procedure Nursing Call Line [phone]   For Insurance or Financial obligations, call [phone]   [facility] Nursing Line [phone]

## 2024-12-30 NOTE — PROGRESS NOTES
Pulmonary Outpatient  Visit     Subjective:       Patient ID: Cassandra Campos is a 74 y.o. female.    Social History     Tobacco Use   Smoking Status Former    Current packs/day: 0.00    Average packs/day: 0.5 packs/day for 42.0 years (21.0 ttl pk-yrs)    Types: Cigarettes    Start date: 1970    Quit date: 2012    Years since quittin.0    Passive exposure: Past   Smokeless Tobacco Never            Chief Complaint: abn PET CT left Hilar          Cassandra Campos is 73 y.o.  Asked to see by Dr Ball  Abn imaging CT abdomen   No cough no wheezing no shortness of breath   Last seen in this department in to any to any   History of asthma on inhaler   Former smoker quit 20 years ago   Retired teacher   No past no history of cancer   No family history of cancer   Sinus congestion - hole in nose from snorting cocaine  Smoked 1ppd for many years - not smoking now, 15 pack years  Denies weight loss hemoptysis no TB exposure       Imaging reviewed with patient          2024  Followup  Revewed PFT  ABG  Walk  CT biopsy for   Will do EBUS on 04/10  Will need cardiology clearance      2024  Followup  Recent PET CT  Left Hilar increased avidity  FDG avid left hilar adenopathy/mass now demonstrating an SUV max of 9.4 as compared to 4.7 on the prior exam.  Images reveiwed with patient    Path  Regional LN were -ve  REGIONAL LYMPH NODES   Regional Lymph Node Status:  Regional lymph nodes present and negative for tumor      Number of Lymph Nodes with Tumor:  0      Number of Lymph Nodes Examined:  5      Xavi Sites Examined:  Level 10, level 9, level 6, level 5, level 4L                          Review of Systems   Constitutional: Negative.    Eyes: Negative.    Respiratory:  Negative for snoring, cough, sputum production, shortness of breath, wheezing, use of rescue inhaler and somnolence.    Cardiovascular: Negative.    Genitourinary: Negative.     Endocrine: endocrine negative    Musculoskeletal: Negative.    Skin: Negative.    Gastrointestinal: Negative.    Psychiatric/Behavioral: Negative.     All other systems reviewed and are negative.      Outpatient Encounter Medications as of 12/30/2024   Medication Sig Dispense Refill    albuterol (PROVENTIL/VENTOLIN HFA) 90 mcg/actuation inhaler INHALE 2 PUFFS INTO THE LUNGS EVERY 4 HOURS AS NEEDED FOR WHEEZING OR SHORTNESS OF BREATH. 18 g 1    amitriptyline (ELAVIL) 50 MG tablet Take 1 tablet (50 mg total) by mouth every evening. 90 tablet 3    blood sugar diagnostic Strp use to test blood sugar 1-2 times a day 200 strip 3    blood-glucose meter (ACCU-CHEK GUIDE ME GLUCOSE MTR) Misc use to check blood glucose 2 times a day 1 each 0    blood-glucose meter (ACCU-CHEK GUIDE ME GLUCOSE MTR) Misc To check blood glucose 1-2 times daily, to use with insurance preferred meter 1 each 0    diclofenac (VOLTAREN) 75 MG EC tablet Take 1 tablet (75 mg total) by mouth 2 (two) times daily with meals. (Patient taking differently: Take 75 mg by mouth 2 (two) times daily with meals. Prn) 60 tablet 0    diclofenac sodium (VOLTAREN ARTHRITIS PAIN) 1 % Gel Apply 2 g topically once daily. 400 g 1    eszopiclone (LUNESTA) 3 mg Tab Take 1 tablet (3 mg total) by mouth every evening. 30 tablet 1    lancets Misc Use to test blood glucose 1-2 times a day, discard lancet after each use 200 each 3    LIDOcaine (LIDODERM) 5 % Place 1 patch onto the skin once daily. Remove & Discard patch within 12 hours or as directed by MD (Patient not taking: Reported on 12/26/2024) 15 patch 1    LORazepam (ATIVAN) 1 MG tablet Take 1 tablet (1 mg total) by mouth 2 (two) times daily. 60 tablet 1    losartan (COZAAR) 25 MG tablet Take 1 tablet (25 mg total) by mouth once daily. 90 tablet 2    metoprolol succinate (TOPROL-XL) 50 MG 24 hr tablet Take 1 tablet (50 mg total) by mouth once daily. 90 tablet 2    omeprazole (PRILOSEC) 20 MG capsule Take 1 capsule (20  mg total) by mouth once daily. 90 capsule 2    semaglutide (OZEMPIC) 2 mg/dose (8 mg/3 mL) PnIj Inject 2 mg into the skin every 7 days. 3 mL 2    triamcinolone acetonide 0.025 % Lotn Apply small amount to affective areas twice a day  take cool showers or baths (Patient taking differently: Apply 1 Dose topically 2 (two) times a day. Prn) 60 mL 0     Facility-Administered Encounter Medications as of 2024   Medication Dose Route Frequency Provider Last Rate Last Admin    sodium chloride 0.9% flush 10 mL  10 mL Intravenous PRN Mike Nuñez MD        sodium chloride 0.9% flush 10 mL  10 mL Intravenous PRN Mike Nuñez MD           The following portions of the patient's history were reviewed and updated as appropriate: She  has a past medical history of Arthritis, Bilateral bunions, Borderline glaucoma, De Quervain's disease (radial styloid tenosynovitis), Gastritis, Hydradenitis, Hyperlipidemia, Hypertension, Insomnia, Migraines (2000), Nasal septum perforation, Obesity, Pneumonia, Restrictive airway disease, Sleep apnea, SVT (supraventricular tachycardia) (2013), Trigger finger, and Type 2 diabetes mellitus ().  She does not have any pertinent problems on file.  She  has a past surgical history that includes Cholecystectomy (2014); Carpal tunnel release; Axillary hidradenitis excision (Bilateral); Trigger finger release (Right, 2015); Cyst Removal (2015); TONSILLECTOMY, ADENOIDECTOMY ();  section (); gastric sleeve (2017); Knee surgery (Right); Cataract extraction (Bilateral); Breast biopsy (Bilateral); Olecranon bursectomy (Right, 2018); Bone exostosis excision (Right, 2018); Surgical removal of bunion with osteotomy of metatarsal bone (Left, 05/10/2019); Surgical removal of bunion with osteotomy of metatarsal bone (Right, 2019); De Quervain's release (Left, 2020); Colonoscopy w/ polypectomy (10/02/2020); Colonoscopy (N/A,  10/02/2020); De Quervain's release (Right, 11/20/2020); Eye surgery; Breast surgery (07/1998); Carpal tunnel release (Right, 02/09/2024); Carpal tunnel release (Left, 03/08/2024); Endobronchial ultrasound (Bilateral, 04/10/2024); transesophageal echocardiogram with possible cardioversion (romero w/ poss cardioversion) (N/A, 5/15/2024); xi robotic rats,with lobectomy,lung (Left, 5/10/2024); Surgical removal of lymph node (Left, 5/10/2024); and Injection of anesthetic agent around multiple intercostal nerves (5/10/2024).  Her family history includes Diabetes in her cousin and maternal aunt; Hypertension in her maternal grandmother; Prostate cancer in her brother.  She  reports that she quit smoking about 13 years ago. Her smoking use included cigarettes. She started smoking about 55 years ago. She has a 21 pack-year smoking history. She has been exposed to tobacco smoke. She has never used smokeless tobacco. She reports that she does not currently use alcohol after a past usage of about 1.0 standard drink of alcohol per week. She reports that she does not use drugs.  She has a current medication list which includes the following prescription(s): albuterol, amitriptyline, blood sugar diagnostic, blood-glucose meter, blood-glucose meter, diclofenac, diclofenac sodium, eszopiclone, lancets, lidocaine, lorazepam, losartan, metoprolol succinate, omeprazole, ozempic, and triamcinolone acetonide, and the following Facility-Administered Medications: sodium chloride 0.9% and sodium chloride 0.9%.  Current Outpatient Medications on File Prior to Visit   Medication Sig Dispense Refill    albuterol (PROVENTIL/VENTOLIN HFA) 90 mcg/actuation inhaler INHALE 2 PUFFS INTO THE LUNGS EVERY 4 HOURS AS NEEDED FOR WHEEZING OR SHORTNESS OF BREATH. 18 g 1    amitriptyline (ELAVIL) 50 MG tablet Take 1 tablet (50 mg total) by mouth every evening. 90 tablet 3    blood sugar diagnostic Strp use to test blood sugar 1-2 times a day 200 strip 3     blood-glucose meter (ACCU-CHEK GUIDE ME GLUCOSE MTR) Misc use to check blood glucose 2 times a day 1 each 0    blood-glucose meter (ACCU-CHEK GUIDE ME GLUCOSE MTR) Misc To check blood glucose 1-2 times daily, to use with insurance preferred meter 1 each 0    diclofenac (VOLTAREN) 75 MG EC tablet Take 1 tablet (75 mg total) by mouth 2 (two) times daily with meals. (Patient taking differently: Take 75 mg by mouth 2 (two) times daily with meals. Prn) 60 tablet 0    diclofenac sodium (VOLTAREN ARTHRITIS PAIN) 1 % Gel Apply 2 g topically once daily. 400 g 1    eszopiclone (LUNESTA) 3 mg Tab Take 1 tablet (3 mg total) by mouth every evening. 30 tablet 1    lancets Misc Use to test blood glucose 1-2 times a day, discard lancet after each use 200 each 3    LIDOcaine (LIDODERM) 5 % Place 1 patch onto the skin once daily. Remove & Discard patch within 12 hours or as directed by MD (Patient not taking: Reported on 12/26/2024) 15 patch 1    LORazepam (ATIVAN) 1 MG tablet Take 1 tablet (1 mg total) by mouth 2 (two) times daily. 60 tablet 1    losartan (COZAAR) 25 MG tablet Take 1 tablet (25 mg total) by mouth once daily. 90 tablet 2    metoprolol succinate (TOPROL-XL) 50 MG 24 hr tablet Take 1 tablet (50 mg total) by mouth once daily. 90 tablet 2    omeprazole (PRILOSEC) 20 MG capsule Take 1 capsule (20 mg total) by mouth once daily. 90 capsule 2    semaglutide (OZEMPIC) 2 mg/dose (8 mg/3 mL) PnIj Inject 2 mg into the skin every 7 days. 3 mL 2    triamcinolone acetonide 0.025 % Lotn Apply small amount to affective areas twice a day  take cool showers or baths (Patient taking differently: Apply 1 Dose topically 2 (two) times a day. Prn) 60 mL 0     Current Facility-Administered Medications on File Prior to Visit   Medication Dose Route Frequency Provider Last Rate Last Admin    sodium chloride 0.9% flush 10 mL  10 mL Intravenous PRN Mike Nuñez MD        sodium chloride 0.9% flush 10 mL  10 mL Intravenous PRN Savannah  "Mike AGUILERA MD         She has No Known Allergies..      BP Readings from Last 3 Encounters:   12/30/24 116/62   12/26/24 122/78   12/24/24 96/62     Snoring / Sleep:            MMRC Dyspnea Scale (4 is worst)     [x] MMRC 0: Dyspneic on strenuous excercise (0 points)    [] MMRC 1: Dyspneic on walking a slight hill (0 points)    [] MMRC 2: Dyspneic on walking level ground; must stop occasionally due to breathlessness (1 point)    [] MMRC 3: Must stop for breathlessness after walking 100 yards or after a few minutes (2 points)    [] MMRC 4: Cannot leave house; breathless on dressing/undressing (3 points)                          No data to display                          Objective:     Vital Signs (Most Recent)  Vital Signs  Pulse: 95  Resp: 20  SpO2: 95 %  BP: 116/62  Pain Score:   5  Pain Loc: Knee  Height and Weight  Height: 5' 3" (160 cm)  Weight: 65.6 kg (144 lb 11.7 oz)  BSA (Calculated - sq m): 1.71 sq meters  BMI (Calculated): 25.6  Weight in (lb) to have BMI = 25: 140.8]  Wt Readings from Last 2 Encounters:   12/30/24 65.6 kg (144 lb 11.7 oz)   12/26/24 66 kg (145 lb 8.1 oz)       Physical Exam  Vitals and nursing note reviewed.   Constitutional:       Appearance: She is normal weight.   HENT:      Head: Normocephalic and atraumatic.      Nose: Nose normal.   Eyes:      Pupils: Pupils are equal, round, and reactive to light.   Cardiovascular:      Rate and Rhythm: Normal rate and regular rhythm.      Pulses: Normal pulses.      Heart sounds: Normal heart sounds.   Pulmonary:      Effort: Pulmonary effort is normal.      Breath sounds: Normal breath sounds.   Abdominal:      General: Bowel sounds are normal.      Palpations: Abdomen is soft.   Musculoskeletal:      Cervical back: Normal range of motion.   Skin:     General: Skin is warm.   Neurological:      General: No focal deficit present.      Mental Status: She is alert and oriented to person, place, and time.   Psychiatric:         Mood and Affect: Mood " "normal.          Laboratory  Lab Results   Component Value Date    WBC 5.21 12/11/2024    RBC 5.46 (H) 12/11/2024    HGB 12.6 12/11/2024    HCT 38.5 12/11/2024    MCV 71 (L) 12/11/2024    MCH 23.1 (L) 12/11/2024    MCHC 32.7 12/11/2024    RDW 19.4 (H) 12/11/2024     12/11/2024    MPV 9.0 (L) 12/11/2024    GRAN 2.5 12/11/2024    GRAN 48.2 12/11/2024    LYMPH 2.0 12/11/2024    LYMPH 37.4 12/11/2024    MONO 0.7 12/11/2024    MONO 13.4 12/11/2024    EOS 0.0 12/11/2024    BASO 0.02 12/11/2024    EOSINOPHIL 0.4 12/11/2024    BASOPHIL 0.4 12/11/2024       BMP  Lab Results   Component Value Date     12/11/2024    K 3.7 12/11/2024     12/11/2024    CO2 22 (L) 12/11/2024    BUN 5 (L) 12/11/2024    CREATININE 0.6 12/11/2024    CALCIUM 9.4 12/11/2024    ANIONGAP 10 12/11/2024    ESTGFRAFRICA >60 06/21/2022    EGFRNONAA >60 06/21/2022    AST 14 12/11/2024    ALT 19 12/11/2024    PROT 7.5 12/11/2024          No results found for: "IGE"     No results found for: "ASPERGILLUS"  No results found for: "AFUMIGATUSCL"     No results found for: "ACE"     Diagnostic Results:  I have personally reviewed today the following studies:    NM PET CT FDG Skull Base to Mid Thigh  Narrative: EXAMINATION:  NM PET CT FDG SKULL BASE TO MID THIGH    CLINICAL HISTORY:  Lung nodule, > 8mm;  Malignant neoplasm of lower lobe, left bronchus or lung    TECHNIQUE:  Segmented attenuation corrected 3-D PET imaging was obtained from the skull base through the mid thighs utilizing 12.28 mCi F-18-FDG.  Glucose level 76 mg/dL noncontrast CT imaging was performed for attenuation correction, diagnosis, and anatomical fusion with PET    COMPARISON:  None.    FINDINGS:  Head/neck: There is normal physiologic uptake noted within the visualized brain parenchyma. No FDG avid adenopathy within the neck.    Chest: FDG avid left hilar adenopathy/mass now demonstrating an SUV max of 9.4 as compared to 4.7 on the prior exam.  The soft tissue mass component " "also appears more prominent in size when compared to the prior exam and measures approximately 1.9 by 1.4 cm in size as compared to approximately 1.3 x 0.9 cm in size on the prior study..  The mass within the lingula appears to have been resected in the interval.  Equivocal subscapular soft tissue density with FDG uptake seen on the left demonstrate an SUV max of 4.5.  Possibilities include metastatic disease versus friction related uptake/inflammation and elastofibroma dorsi.  Benign etiology favored.  There is also some low level uptake in a similar region on the right without a obvious soft tissue mass.    Abdomen/Pelvis: Normal physiologic uptake noted within the liver, spleen, urinary tract, and bowel.The adrenals are unremarkable in appearance.  There is evidence for prior cholecystectomy.    Skeletal: There appears to be degenerative type uptake seen in the region of either shoulder and the bilateral hips and at a few levels along the facet joints of the cervical spine and to a lesser degree the lumbar spine.  Impression: 1. Interval resection of the previously noted lingular mass with no evidence to suggest recurrent or residual disease at the postsurgical site.  2. FDG avid left hilar adenopathy/mass, increased in size and FDG uptake when compared to the prior exam most consistent with disease progression.  3. Remaining findings as discussed above.    Electronically signed by: Solomon Pnenington DO  Date:    12/23/2024  Time:    10:20         No results for input(s): "PH", "PCO2", "PO2", "HCO3", "POCSATURATED", "BE" in the last 72 hours.                         Assessment/Plan:     Problem List Items Addressed This Visit       Primary hypertension    Mild intermittent asthma    Solitary pulmonary nodule    Malignant neoplasm of lower lobe of left lung    Hilar adenopathy     FDG avid left hilar adenopathy/mass now demonstrating an SUV max of 9.4 as compared to 4.7 on the prior exam.          Abnormal PET scan, " mediastinum - Primary     FDG avid left hilar adenopathy/mass now demonstrating an SUV max of 9.4 as compared to 4.7 on the prior exam.     Plan EBUS station 7 and 10L         Relevant Orders    EKG 12-lead    Case Request Endoscopy: Bronchoscopy - insert lighted tube into airway to take a biopsy of lung, ENDOBRONCHIAL ULTRASOUND (EBUS) (Completed)        EBUS for station 10 L      My diagnostic impression and work-up plan were discussed at length with patient. Risks were discussed. EBUS procedure. Complications of the procedure discussed in detail with patient. Complications including but not limited to infection that may require hospital admission, bleeding that may require blood transfusion and or hospital admission, perforation of the lung which may require surgery,chance of injury to the throat, windpipe or bronchial tubes, laryngospam, coughing, aspiration, hypoxemia or cardiac arrythmias. Patient expressed and verbalized understanding. The material risks of anesthesia in connection with the procedure including brain damage, paralysis from the neck downwards, paralysis from the waist downwards, loss of function of an arm or a leg, disfigurement (incluing scars)and death were discussed with patient who expressedand verbalized understanding. Alternate treatments and material risks associated with such alternatives were discussed with pateint. These include radiologic surveillance with minimal risk and sugery with an indeterminate risk. The material risks of refusing the procedure was discussed in detail. This includes no diagnosis or confirmation of diagnisis and rendering of appropriate treatment the risk of which depends on the nature of the diagnosed illness. Patient expressed and verbalized understanding. Procedure scheduled for date 01/08/2025. Consent signed. Orders entered. Coagulation studies per orders.   All questions were answered and the patient expressed understanding      Follow up in about 4 weeks  (around 1/27/2025), or EBUS consent, EKG.    This note was prepared using voice recognition system and is likely to have sound alike errors that may have been overlooked even after proof reading.  Please call me with any questions    Discussed diagnosis, its evaluation, treatment and usual course. All questions answered.    Thank you for the courtesy of participating in the care of this patient    Adrian Robin MD      Personal Diagnostic Review  []  CXR    []  ECHO    []  ONSAT    []  6MWD    []  LABS    []  CHEST CT    []  PET CT    []  Biopsy results

## 2024-12-30 NOTE — H&P (VIEW-ONLY)
Pulmonary Outpatient  Visit     Subjective:       Patient ID: Cassandra Campos is a 74 y.o. female.    Social History     Tobacco Use   Smoking Status Former    Current packs/day: 0.00    Average packs/day: 0.5 packs/day for 42.0 years (21.0 ttl pk-yrs)    Types: Cigarettes    Start date: 1970    Quit date: 2012    Years since quittin.0    Passive exposure: Past   Smokeless Tobacco Never            Chief Complaint: abn PET CT left Hilar          Cassandra Campos is 73 y.o.  Asked to see by Dr Ball  Abn imaging CT abdomen   No cough no wheezing no shortness of breath   Last seen in this department in to any to any   History of asthma on inhaler   Former smoker quit 20 years ago   Retired teacher   No past no history of cancer   No family history of cancer   Sinus congestion - hole in nose from snorting cocaine  Smoked 1ppd for many years - not smoking now, 15 pack years  Denies weight loss hemoptysis no TB exposure       Imaging reviewed with patient          2024  Followup  Revewed PFT  ABG  Walk  CT biopsy for   Will do EBUS on 04/10  Will need cardiology clearance      2024  Followup  Recent PET CT  Left Hilar increased avidity  FDG avid left hilar adenopathy/mass now demonstrating an SUV max of 9.4 as compared to 4.7 on the prior exam.  Images reveiwed with patient    Path  Regional LN were -ve  REGIONAL LYMPH NODES   Regional Lymph Node Status:  Regional lymph nodes present and negative for tumor      Number of Lymph Nodes with Tumor:  0      Number of Lymph Nodes Examined:  5      Xavi Sites Examined:  Level 10, level 9, level 6, level 5, level 4L                          Review of Systems   Constitutional: Negative.    Eyes: Negative.    Respiratory:  Negative for snoring, cough, sputum production, shortness of breath, wheezing, use of rescue inhaler and somnolence.    Cardiovascular: Negative.    Genitourinary: Negative.     Endocrine: endocrine negative    Musculoskeletal: Negative.    Skin: Negative.    Gastrointestinal: Negative.    Psychiatric/Behavioral: Negative.     All other systems reviewed and are negative.      Outpatient Encounter Medications as of 12/30/2024   Medication Sig Dispense Refill    albuterol (PROVENTIL/VENTOLIN HFA) 90 mcg/actuation inhaler INHALE 2 PUFFS INTO THE LUNGS EVERY 4 HOURS AS NEEDED FOR WHEEZING OR SHORTNESS OF BREATH. 18 g 1    amitriptyline (ELAVIL) 50 MG tablet Take 1 tablet (50 mg total) by mouth every evening. 90 tablet 3    blood sugar diagnostic Strp use to test blood sugar 1-2 times a day 200 strip 3    blood-glucose meter (ACCU-CHEK GUIDE ME GLUCOSE MTR) Misc use to check blood glucose 2 times a day 1 each 0    blood-glucose meter (ACCU-CHEK GUIDE ME GLUCOSE MTR) Misc To check blood glucose 1-2 times daily, to use with insurance preferred meter 1 each 0    diclofenac (VOLTAREN) 75 MG EC tablet Take 1 tablet (75 mg total) by mouth 2 (two) times daily with meals. (Patient taking differently: Take 75 mg by mouth 2 (two) times daily with meals. Prn) 60 tablet 0    diclofenac sodium (VOLTAREN ARTHRITIS PAIN) 1 % Gel Apply 2 g topically once daily. 400 g 1    eszopiclone (LUNESTA) 3 mg Tab Take 1 tablet (3 mg total) by mouth every evening. 30 tablet 1    lancets Misc Use to test blood glucose 1-2 times a day, discard lancet after each use 200 each 3    LIDOcaine (LIDODERM) 5 % Place 1 patch onto the skin once daily. Remove & Discard patch within 12 hours or as directed by MD (Patient not taking: Reported on 12/26/2024) 15 patch 1    LORazepam (ATIVAN) 1 MG tablet Take 1 tablet (1 mg total) by mouth 2 (two) times daily. 60 tablet 1    losartan (COZAAR) 25 MG tablet Take 1 tablet (25 mg total) by mouth once daily. 90 tablet 2    metoprolol succinate (TOPROL-XL) 50 MG 24 hr tablet Take 1 tablet (50 mg total) by mouth once daily. 90 tablet 2    omeprazole (PRILOSEC) 20 MG capsule Take 1 capsule (20  mg total) by mouth once daily. 90 capsule 2    semaglutide (OZEMPIC) 2 mg/dose (8 mg/3 mL) PnIj Inject 2 mg into the skin every 7 days. 3 mL 2    triamcinolone acetonide 0.025 % Lotn Apply small amount to affective areas twice a day  take cool showers or baths (Patient taking differently: Apply 1 Dose topically 2 (two) times a day. Prn) 60 mL 0     Facility-Administered Encounter Medications as of 2024   Medication Dose Route Frequency Provider Last Rate Last Admin    sodium chloride 0.9% flush 10 mL  10 mL Intravenous PRN Mike Nuñez MD        sodium chloride 0.9% flush 10 mL  10 mL Intravenous PRN Mike Nuñez MD           The following portions of the patient's history were reviewed and updated as appropriate: She  has a past medical history of Arthritis, Bilateral bunions, Borderline glaucoma, De Quervain's disease (radial styloid tenosynovitis), Gastritis, Hydradenitis, Hyperlipidemia, Hypertension, Insomnia, Migraines (2000), Nasal septum perforation, Obesity, Pneumonia, Restrictive airway disease, Sleep apnea, SVT (supraventricular tachycardia) (2013), Trigger finger, and Type 2 diabetes mellitus ().  She does not have any pertinent problems on file.  She  has a past surgical history that includes Cholecystectomy (2014); Carpal tunnel release; Axillary hidradenitis excision (Bilateral); Trigger finger release (Right, 2015); Cyst Removal (2015); TONSILLECTOMY, ADENOIDECTOMY ();  section (); gastric sleeve (2017); Knee surgery (Right); Cataract extraction (Bilateral); Breast biopsy (Bilateral); Olecranon bursectomy (Right, 2018); Bone exostosis excision (Right, 2018); Surgical removal of bunion with osteotomy of metatarsal bone (Left, 05/10/2019); Surgical removal of bunion with osteotomy of metatarsal bone (Right, 2019); De Quervain's release (Left, 2020); Colonoscopy w/ polypectomy (10/02/2020); Colonoscopy (N/A,  10/02/2020); De Quervain's release (Right, 11/20/2020); Eye surgery; Breast surgery (07/1998); Carpal tunnel release (Right, 02/09/2024); Carpal tunnel release (Left, 03/08/2024); Endobronchial ultrasound (Bilateral, 04/10/2024); transesophageal echocardiogram with possible cardioversion (romero w/ poss cardioversion) (N/A, 5/15/2024); xi robotic rats,with lobectomy,lung (Left, 5/10/2024); Surgical removal of lymph node (Left, 5/10/2024); and Injection of anesthetic agent around multiple intercostal nerves (5/10/2024).  Her family history includes Diabetes in her cousin and maternal aunt; Hypertension in her maternal grandmother; Prostate cancer in her brother.  She  reports that she quit smoking about 13 years ago. Her smoking use included cigarettes. She started smoking about 55 years ago. She has a 21 pack-year smoking history. She has been exposed to tobacco smoke. She has never used smokeless tobacco. She reports that she does not currently use alcohol after a past usage of about 1.0 standard drink of alcohol per week. She reports that she does not use drugs.  She has a current medication list which includes the following prescription(s): albuterol, amitriptyline, blood sugar diagnostic, blood-glucose meter, blood-glucose meter, diclofenac, diclofenac sodium, eszopiclone, lancets, lidocaine, lorazepam, losartan, metoprolol succinate, omeprazole, ozempic, and triamcinolone acetonide, and the following Facility-Administered Medications: sodium chloride 0.9% and sodium chloride 0.9%.  Current Outpatient Medications on File Prior to Visit   Medication Sig Dispense Refill    albuterol (PROVENTIL/VENTOLIN HFA) 90 mcg/actuation inhaler INHALE 2 PUFFS INTO THE LUNGS EVERY 4 HOURS AS NEEDED FOR WHEEZING OR SHORTNESS OF BREATH. 18 g 1    amitriptyline (ELAVIL) 50 MG tablet Take 1 tablet (50 mg total) by mouth every evening. 90 tablet 3    blood sugar diagnostic Strp use to test blood sugar 1-2 times a day 200 strip 3     blood-glucose meter (ACCU-CHEK GUIDE ME GLUCOSE MTR) Misc use to check blood glucose 2 times a day 1 each 0    blood-glucose meter (ACCU-CHEK GUIDE ME GLUCOSE MTR) Misc To check blood glucose 1-2 times daily, to use with insurance preferred meter 1 each 0    diclofenac (VOLTAREN) 75 MG EC tablet Take 1 tablet (75 mg total) by mouth 2 (two) times daily with meals. (Patient taking differently: Take 75 mg by mouth 2 (two) times daily with meals. Prn) 60 tablet 0    diclofenac sodium (VOLTAREN ARTHRITIS PAIN) 1 % Gel Apply 2 g topically once daily. 400 g 1    eszopiclone (LUNESTA) 3 mg Tab Take 1 tablet (3 mg total) by mouth every evening. 30 tablet 1    lancets Misc Use to test blood glucose 1-2 times a day, discard lancet after each use 200 each 3    LIDOcaine (LIDODERM) 5 % Place 1 patch onto the skin once daily. Remove & Discard patch within 12 hours or as directed by MD (Patient not taking: Reported on 12/26/2024) 15 patch 1    LORazepam (ATIVAN) 1 MG tablet Take 1 tablet (1 mg total) by mouth 2 (two) times daily. 60 tablet 1    losartan (COZAAR) 25 MG tablet Take 1 tablet (25 mg total) by mouth once daily. 90 tablet 2    metoprolol succinate (TOPROL-XL) 50 MG 24 hr tablet Take 1 tablet (50 mg total) by mouth once daily. 90 tablet 2    omeprazole (PRILOSEC) 20 MG capsule Take 1 capsule (20 mg total) by mouth once daily. 90 capsule 2    semaglutide (OZEMPIC) 2 mg/dose (8 mg/3 mL) PnIj Inject 2 mg into the skin every 7 days. 3 mL 2    triamcinolone acetonide 0.025 % Lotn Apply small amount to affective areas twice a day  take cool showers or baths (Patient taking differently: Apply 1 Dose topically 2 (two) times a day. Prn) 60 mL 0     Current Facility-Administered Medications on File Prior to Visit   Medication Dose Route Frequency Provider Last Rate Last Admin    sodium chloride 0.9% flush 10 mL  10 mL Intravenous PRN Mike Nuñez MD        sodium chloride 0.9% flush 10 mL  10 mL Intravenous PRN Savannah  "Mike AGUILERA MD         She has No Known Allergies..      BP Readings from Last 3 Encounters:   12/30/24 116/62   12/26/24 122/78   12/24/24 96/62     Snoring / Sleep:            MMRC Dyspnea Scale (4 is worst)     [x] MMRC 0: Dyspneic on strenuous excercise (0 points)    [] MMRC 1: Dyspneic on walking a slight hill (0 points)    [] MMRC 2: Dyspneic on walking level ground; must stop occasionally due to breathlessness (1 point)    [] MMRC 3: Must stop for breathlessness after walking 100 yards or after a few minutes (2 points)    [] MMRC 4: Cannot leave house; breathless on dressing/undressing (3 points)                          No data to display                          Objective:     Vital Signs (Most Recent)  Vital Signs  Pulse: 95  Resp: 20  SpO2: 95 %  BP: 116/62  Pain Score:   5  Pain Loc: Knee  Height and Weight  Height: 5' 3" (160 cm)  Weight: 65.6 kg (144 lb 11.7 oz)  BSA (Calculated - sq m): 1.71 sq meters  BMI (Calculated): 25.6  Weight in (lb) to have BMI = 25: 140.8]  Wt Readings from Last 2 Encounters:   12/30/24 65.6 kg (144 lb 11.7 oz)   12/26/24 66 kg (145 lb 8.1 oz)       Physical Exam  Vitals and nursing note reviewed.   Constitutional:       Appearance: She is normal weight.   HENT:      Head: Normocephalic and atraumatic.      Nose: Nose normal.   Eyes:      Pupils: Pupils are equal, round, and reactive to light.   Cardiovascular:      Rate and Rhythm: Normal rate and regular rhythm.      Pulses: Normal pulses.      Heart sounds: Normal heart sounds.   Pulmonary:      Effort: Pulmonary effort is normal.      Breath sounds: Normal breath sounds.   Abdominal:      General: Bowel sounds are normal.      Palpations: Abdomen is soft.   Musculoskeletal:      Cervical back: Normal range of motion.   Skin:     General: Skin is warm.   Neurological:      General: No focal deficit present.      Mental Status: She is alert and oriented to person, place, and time.   Psychiatric:         Mood and Affect: Mood " "normal.          Laboratory  Lab Results   Component Value Date    WBC 5.21 12/11/2024    RBC 5.46 (H) 12/11/2024    HGB 12.6 12/11/2024    HCT 38.5 12/11/2024    MCV 71 (L) 12/11/2024    MCH 23.1 (L) 12/11/2024    MCHC 32.7 12/11/2024    RDW 19.4 (H) 12/11/2024     12/11/2024    MPV 9.0 (L) 12/11/2024    GRAN 2.5 12/11/2024    GRAN 48.2 12/11/2024    LYMPH 2.0 12/11/2024    LYMPH 37.4 12/11/2024    MONO 0.7 12/11/2024    MONO 13.4 12/11/2024    EOS 0.0 12/11/2024    BASO 0.02 12/11/2024    EOSINOPHIL 0.4 12/11/2024    BASOPHIL 0.4 12/11/2024       BMP  Lab Results   Component Value Date     12/11/2024    K 3.7 12/11/2024     12/11/2024    CO2 22 (L) 12/11/2024    BUN 5 (L) 12/11/2024    CREATININE 0.6 12/11/2024    CALCIUM 9.4 12/11/2024    ANIONGAP 10 12/11/2024    ESTGFRAFRICA >60 06/21/2022    EGFRNONAA >60 06/21/2022    AST 14 12/11/2024    ALT 19 12/11/2024    PROT 7.5 12/11/2024          No results found for: "IGE"     No results found for: "ASPERGILLUS"  No results found for: "AFUMIGATUSCL"     No results found for: "ACE"     Diagnostic Results:  I have personally reviewed today the following studies:    NM PET CT FDG Skull Base to Mid Thigh  Narrative: EXAMINATION:  NM PET CT FDG SKULL BASE TO MID THIGH    CLINICAL HISTORY:  Lung nodule, > 8mm;  Malignant neoplasm of lower lobe, left bronchus or lung    TECHNIQUE:  Segmented attenuation corrected 3-D PET imaging was obtained from the skull base through the mid thighs utilizing 12.28 mCi F-18-FDG.  Glucose level 76 mg/dL noncontrast CT imaging was performed for attenuation correction, diagnosis, and anatomical fusion with PET    COMPARISON:  None.    FINDINGS:  Head/neck: There is normal physiologic uptake noted within the visualized brain parenchyma. No FDG avid adenopathy within the neck.    Chest: FDG avid left hilar adenopathy/mass now demonstrating an SUV max of 9.4 as compared to 4.7 on the prior exam.  The soft tissue mass component " "also appears more prominent in size when compared to the prior exam and measures approximately 1.9 by 1.4 cm in size as compared to approximately 1.3 x 0.9 cm in size on the prior study..  The mass within the lingula appears to have been resected in the interval.  Equivocal subscapular soft tissue density with FDG uptake seen on the left demonstrate an SUV max of 4.5.  Possibilities include metastatic disease versus friction related uptake/inflammation and elastofibroma dorsi.  Benign etiology favored.  There is also some low level uptake in a similar region on the right without a obvious soft tissue mass.    Abdomen/Pelvis: Normal physiologic uptake noted within the liver, spleen, urinary tract, and bowel.The adrenals are unremarkable in appearance.  There is evidence for prior cholecystectomy.    Skeletal: There appears to be degenerative type uptake seen in the region of either shoulder and the bilateral hips and at a few levels along the facet joints of the cervical spine and to a lesser degree the lumbar spine.  Impression: 1. Interval resection of the previously noted lingular mass with no evidence to suggest recurrent or residual disease at the postsurgical site.  2. FDG avid left hilar adenopathy/mass, increased in size and FDG uptake when compared to the prior exam most consistent with disease progression.  3. Remaining findings as discussed above.    Electronically signed by: Solomon Pennington DO  Date:    12/23/2024  Time:    10:20         No results for input(s): "PH", "PCO2", "PO2", "HCO3", "POCSATURATED", "BE" in the last 72 hours.                         Assessment/Plan:     Problem List Items Addressed This Visit       Primary hypertension    Mild intermittent asthma    Solitary pulmonary nodule    Malignant neoplasm of lower lobe of left lung    Hilar adenopathy     FDG avid left hilar adenopathy/mass now demonstrating an SUV max of 9.4 as compared to 4.7 on the prior exam.          Abnormal PET scan, " mediastinum - Primary     FDG avid left hilar adenopathy/mass now demonstrating an SUV max of 9.4 as compared to 4.7 on the prior exam.     Plan EBUS station 7 and 10L         Relevant Orders    EKG 12-lead    Case Request Endoscopy: Bronchoscopy - insert lighted tube into airway to take a biopsy of lung, ENDOBRONCHIAL ULTRASOUND (EBUS) (Completed)        EBUS for station 10 L      My diagnostic impression and work-up plan were discussed at length with patient. Risks were discussed. EBUS procedure. Complications of the procedure discussed in detail with patient. Complications including but not limited to infection that may require hospital admission, bleeding that may require blood transfusion and or hospital admission, perforation of the lung which may require surgery,chance of injury to the throat, windpipe or bronchial tubes, laryngospam, coughing, aspiration, hypoxemia or cardiac arrythmias. Patient expressed and verbalized understanding. The material risks of anesthesia in connection with the procedure including brain damage, paralysis from the neck downwards, paralysis from the waist downwards, loss of function of an arm or a leg, disfigurement (incluing scars)and death were discussed with patient who expressedand verbalized understanding. Alternate treatments and material risks associated with such alternatives were discussed with pateint. These include radiologic surveillance with minimal risk and sugery with an indeterminate risk. The material risks of refusing the procedure was discussed in detail. This includes no diagnosis or confirmation of diagnisis and rendering of appropriate treatment the risk of which depends on the nature of the diagnosed illness. Patient expressed and verbalized understanding. Procedure scheduled for date 01/08/2025. Consent signed. Orders entered. Coagulation studies per orders.   All questions were answered and the patient expressed understanding      Follow up in about 4 weeks  (around 1/27/2025), or EBUS consent, EKG.    This note was prepared using voice recognition system and is likely to have sound alike errors that may have been overlooked even after proof reading.  Please call me with any questions    Discussed diagnosis, its evaluation, treatment and usual course. All questions answered.    Thank you for the courtesy of participating in the care of this patient    Adrian Robin MD      Personal Diagnostic Review  []  CXR    []  ECHO    []  ONSAT    []  6MWD    []  LABS    []  CHEST CT    []  PET CT    []  Biopsy results

## 2024-12-30 NOTE — ASSESSMENT & PLAN NOTE
FDG avid left hilar adenopathy/mass now demonstrating an SUV max of 9.4 as compared to 4.7 on the prior exam.     Plan EBUS station 7 and 10L    EKG use abnormal   Referral to cardiology for preop clearance

## 2024-12-31 ENCOUNTER — OFFICE VISIT (OUTPATIENT)
Dept: INTERNAL MEDICINE | Facility: CLINIC | Age: 74
End: 2024-12-31
Payer: MEDICARE

## 2024-12-31 VITALS
SYSTOLIC BLOOD PRESSURE: 120 MMHG | DIASTOLIC BLOOD PRESSURE: 70 MMHG | HEIGHT: 63 IN | TEMPERATURE: 97 F | BODY MASS INDEX: 25.55 KG/M2 | OXYGEN SATURATION: 97 % | HEART RATE: 102 BPM | WEIGHT: 144.19 LBS

## 2024-12-31 DIAGNOSIS — E11.65 TYPE 2 DIABETES MELLITUS WITH HYPERGLYCEMIA, WITHOUT LONG-TERM CURRENT USE OF INSULIN: ICD-10-CM

## 2024-12-31 DIAGNOSIS — C34.32 MALIGNANT NEOPLASM OF LOWER LOBE OF LEFT LUNG: ICD-10-CM

## 2024-12-31 DIAGNOSIS — M19.90 OSTEOARTHRITIS, UNSPECIFIED OSTEOARTHRITIS TYPE, UNSPECIFIED SITE: Primary | ICD-10-CM

## 2024-12-31 DIAGNOSIS — I10 PRIMARY HYPERTENSION: ICD-10-CM

## 2024-12-31 PROCEDURE — 1101F PT FALLS ASSESS-DOCD LE1/YR: CPT | Mod: CPTII,S$GLB,, | Performed by: FAMILY MEDICINE

## 2024-12-31 PROCEDURE — 3074F SYST BP LT 130 MM HG: CPT | Mod: CPTII,S$GLB,, | Performed by: FAMILY MEDICINE

## 2024-12-31 PROCEDURE — 3066F NEPHROPATHY DOC TX: CPT | Mod: CPTII,S$GLB,, | Performed by: FAMILY MEDICINE

## 2024-12-31 PROCEDURE — 3044F HG A1C LEVEL LT 7.0%: CPT | Mod: CPTII,S$GLB,, | Performed by: FAMILY MEDICINE

## 2024-12-31 PROCEDURE — 3288F FALL RISK ASSESSMENT DOCD: CPT | Mod: CPTII,S$GLB,, | Performed by: FAMILY MEDICINE

## 2024-12-31 PROCEDURE — 99214 OFFICE O/P EST MOD 30 MIN: CPT | Mod: S$GLB,,, | Performed by: FAMILY MEDICINE

## 2024-12-31 PROCEDURE — 3078F DIAST BP <80 MM HG: CPT | Mod: CPTII,S$GLB,, | Performed by: FAMILY MEDICINE

## 2024-12-31 PROCEDURE — 1125F AMNT PAIN NOTED PAIN PRSNT: CPT | Mod: CPTII,S$GLB,, | Performed by: FAMILY MEDICINE

## 2024-12-31 PROCEDURE — 3060F POS MICROALBUMINURIA REV: CPT | Mod: CPTII,S$GLB,, | Performed by: FAMILY MEDICINE

## 2024-12-31 PROCEDURE — 99999 PR PBB SHADOW E&M-EST. PATIENT-LVL IV: CPT | Mod: PBBFAC,,, | Performed by: FAMILY MEDICINE

## 2024-12-31 PROCEDURE — 1159F MED LIST DOCD IN RCRD: CPT | Mod: CPTII,S$GLB,, | Performed by: FAMILY MEDICINE

## 2024-12-31 PROCEDURE — 4010F ACE/ARB THERAPY RXD/TAKEN: CPT | Mod: CPTII,S$GLB,, | Performed by: FAMILY MEDICINE

## 2024-12-31 RX ORDER — TRAMADOL HYDROCHLORIDE 50 MG/1
50 TABLET ORAL 2 TIMES DAILY
Qty: 60 EACH | Refills: 0 | Status: SHIPPED | OUTPATIENT
Start: 2024-12-31

## 2024-12-31 NOTE — PROGRESS NOTES
Patient ID: Cassandra Campos is a 74 y.o. female.    Chief Complaint: Follow-up    History of Present Illness    Ms. Campos presents today with severe joint pain.    She reports joint pain in her hands, knees, and back which worsened about a week ago. She notes that Voltaren cream, Voltaren pills, and Ibuprofen 800 mg have not been effective in alleviating the pain. PET scan showed degenerative arthritis in the shoulder and hips.    PET scan revealed a 1.9 x 1.4 cm mass in the lung. She has history of previous surgery for left lung mass removal.    She continues Ozempic for blood sugar control. Blood sugar levels are well-controlled, typically ranging from , with a previous high of 156 in May. She no longer experiences diarrhea with Ozempic.      ROS:  General: -fever, -chills, -fatigue, -weight gain, -weight loss  Eyes: -vision changes, -redness, -discharge  ENT: -ear pain, -nasal congestion, -sore throat  Cardiovascular: -chest pain, -palpitations, -lower extremity edema  Respiratory: -cough, -shortness of breath  Gastrointestinal: -abdominal pain, -nausea, -vomiting, -diarrhea, -constipation, -blood in stool  Genitourinary: -dysuria, -hematuria, -frequency  Musculoskeletal: +joint pain, -muscle pain, +back pain  Skin: -rash, -lesion  Neurological: -headache, -dizziness, -numbness, -tingling         Physical Exam    General: In no acute distress. Not ill-appearing or diaphoretic.  Neck: Normal thyroid. No cervical lymphadenopathy. 2+ carotid pulses.  Cardiovascular: Normal rate and regular  rhythm. No murmur heard. No gallop.  Pulmonary: Pulmonary effort is normal. No respiratory distress. No wheezing. No rhonchi. No rales.  Abdominal: Soft. Nontender. Nondistended. No mass.  Musculoskeletal: + tenderness.  Neurological: Alert.  Psychiatric: Mood normal. Behavior normal.         Assessment & Plan    DEGENERATIVE ARTHRITIS:  - Reviewed patient's recent PET scan results showing degenerative arthritis in  shoulder, hips, and other areas.  - Considered arthritis as likely cause of patient's widespread joint pain.  - Will prescribe pain medication to manage symptoms until patient can see orthopedics.  - Started tramadol 2 times daily for pain management.  - Follow up with orthopedics when they return from out of town.    LUNG MASS:  - Noted 1.9 x 1.4 cm lung mass, which has changed in size but with no bone metastases.  - Follow up with Dr. Ortiz again for clearance related to lung mass.    TYPE 2 DIABETES:  - Assessed Ozempic use for blood sugar control, noting effectiveness without side effects.  - Continued Ozempic at current dose for blood sugar control.    FOLLOW-UP:  - Follow up on the 14th with Dr. Ortiz (cardiologist) for clearance.              Follow up in about 3 months (around 3/31/2025).    This note was generated with the assistance of ambient listening technology. Verbal consent was obtained by the patient and accompanying visitor(s) for the recording of patient appointment to facilitate this note. I attest to having reviewed and edited the generated note for accuracy, though some syntax or spelling errors may persist. Please contact the author of this note for any clarification.

## 2025-01-05 ENCOUNTER — NURSE TRIAGE (OUTPATIENT)
Dept: ADMINISTRATIVE | Facility: CLINIC | Age: 75
End: 2025-01-05
Payer: MEDICARE

## 2025-01-05 NOTE — TELEPHONE ENCOUNTER
Appointment at 1/8 at 4:00 for radiology, 1/9 appointment at 1:00 pm for Ortho, 1/10 VV with Dr. Pelaez at 2:20, 1/14 at 2:20 with Pulmonary and 1/16 at 11:00 am with Sports Medicine. All appointments reviewed and patient verb understanding. No further questions or concerns.   Reason for Disposition   General information question, no triage required and triager able to answer question    Protocols used: Information Only Call - No Triage-A-

## 2025-01-06 ENCOUNTER — TELEPHONE (OUTPATIENT)
Dept: RADIATION ONCOLOGY | Facility: CLINIC | Age: 75
End: 2025-01-06
Payer: MEDICARE

## 2025-01-06 NOTE — TELEPHONE ENCOUNTER
Made call to schedule consult appt in radiation oncology. No answer and VM is full. Will try another number.

## 2025-01-08 ENCOUNTER — HOSPITAL ENCOUNTER (OUTPATIENT)
Dept: RADIOLOGY | Facility: HOSPITAL | Age: 75
Discharge: HOME | End: 2025-01-08
Attending: INTERNAL MEDICINE
Payer: MEDICARE

## 2025-01-08 ENCOUNTER — OFFICE VISIT (OUTPATIENT)
Dept: ORTHOPEDICS | Facility: CLINIC | Age: 75
End: 2025-01-08
Payer: MEDICARE

## 2025-01-08 VITALS — WEIGHT: 144.19 LBS | BODY MASS INDEX: 25.55 KG/M2 | HEIGHT: 63 IN

## 2025-01-08 DIAGNOSIS — C34.92 ADENOCARCINOMA OF LEFT LUNG: ICD-10-CM

## 2025-01-08 DIAGNOSIS — M18.0 ARTHRITIS OF CARPOMETACARPAL (CMC) JOINT OF BOTH THUMBS: ICD-10-CM

## 2025-01-08 DIAGNOSIS — M65.30 TRIGGER FINGER, ACQUIRED: Primary | ICD-10-CM

## 2025-01-08 PROCEDURE — 25500020 PHARM REV CODE 255: Performed by: INTERNAL MEDICINE

## 2025-01-08 PROCEDURE — 70553 MRI BRAIN STEM W/O & W/DYE: CPT | Mod: 26,,, | Performed by: RADIOLOGY

## 2025-01-08 PROCEDURE — A9585 GADOBUTROL INJECTION: HCPCS | Performed by: INTERNAL MEDICINE

## 2025-01-08 PROCEDURE — 70553 MRI BRAIN STEM W/O & W/DYE: CPT | Mod: TC

## 2025-01-08 PROCEDURE — 99999 PR PBB SHADOW E&M-EST. PATIENT-LVL III: CPT | Mod: PBBFAC,,, | Performed by: ORTHOPAEDIC SURGERY

## 2025-01-08 RX ORDER — TRIAMCINOLONE ACETONIDE 40 MG/ML
40 INJECTION, SUSPENSION INTRA-ARTICULAR; INTRAMUSCULAR
Status: DISCONTINUED | OUTPATIENT
Start: 2025-01-08 | End: 2025-01-08 | Stop reason: HOSPADM

## 2025-01-08 RX ORDER — GADOBUTROL 604.72 MG/ML
10 INJECTION INTRAVENOUS
Status: COMPLETED | OUTPATIENT
Start: 2025-01-08 | End: 2025-01-08

## 2025-01-08 RX ADMIN — GADOBUTROL 6 ML: 604.72 INJECTION INTRAVENOUS at 04:01

## 2025-01-08 RX ADMIN — TRIAMCINOLONE ACETONIDE 40 MG: 40 INJECTION, SUSPENSION INTRA-ARTICULAR; INTRAMUSCULAR at 02:01

## 2025-01-08 NOTE — PROGRESS NOTES
Subjective:     Patient ID: Cassandra Campos is a 74 y.o. female.    Chief Complaint: Pain of the Right Hand and Pain of the Left Hand      HPI:  The patient is a 74-year-old female with bilateral index trigger fingers and bilateral thumb basal joint arthritis.  She was injected in both thumb basal joints 10/23/2024 with good relief until recently and requests reinjection today    Past Medical History:   Diagnosis Date    Arthritis     hands    Bilateral bunions     Borderline glaucoma     De Quervain's disease (radial styloid tenosynovitis)     Gastritis     upper GI 2017    Hydradenitis     Hyperlipidemia     Hypertension     Insomnia     Migraines 2000    Nasal septum perforation     Obesity     Pneumonia     Restrictive airway disease     Sleep apnea     SVT (supraventricular tachycardia) 2013    Trigger finger     Type 2 diabetes mellitus      am 2024     Past Surgical History:   Procedure Laterality Date    AXILLARY HIDRADENITIS EXCISION Bilateral     BONE EXOSTOSIS EXCISION Right 2018    Procedure: EXCISION, EXOSTOSIS;  Surgeon: Jayro Pedraza Sr., MD;  Location: [facility] OR;  Service: Orthopedics;  Laterality: Right;    BREAST BIOPSY Bilateral     both benign    BREAST SURGERY  1998    CARPAL TUNNEL RELEASE      bilateral    CARPAL TUNNEL RELEASE Right 2024    Procedure: RELEASE, CARPAL TUNNEL;  Surgeon: Jaime Oswald MD;  Location: [facility] OR;  Service: Orthopedics;  Laterality: Right;    CARPAL TUNNEL RELEASE Left 2024    Procedure: RELEASE, CARPAL TUNNEL;  Surgeon: Jaime Oswald MD;  Location: [facility] OR;  Service: Orthopedics;  Laterality: Left;    CATARACT EXTRACTION Bilateral     OU     SECTION  1979    CHOLECYSTECTOMY  2014    COLONOSCOPY N/A 10/02/2020    Procedure: COLONOSCOPY;  Surgeon: Tushar Edwards MD;  Location: [facility] ENDO;  Service: Endoscopy;  Laterality: N/A;    COLONOSCOPY W/ POLYPECTOMY  10/02/2020    Polyps x3, repeat 5  years; Tushar Edwards MD     CYST REMOVAL  07/2015    sebaceous cyst removed from face    DE QUERVAIN'S RELEASE Left 01/16/2020    Procedure: RELEASE, HAND, FOR DEQUERVAIN'S TENOSYNOVITIS;  Surgeon: Jaime Oswald MD;  Location: [facility] OR;  Service: Orthopedics;  Laterality: Left;    DE QUERVAIN'S RELEASE Right 11/20/2020    Procedure: RELEASE, HAND, FOR DEQUERVAIN'S TENOSYNOVITIS;  Surgeon: Jaime Oswald MD;  Location: [facility] OR;  Service: Orthopedics;  Laterality: Right;    ENDOBRONCHIAL ULTRASOUND Bilateral 04/10/2024    Procedure: ENDOBRONCHIAL ULTRASOUND (EBUS);  Surgeon: Adrian Robin MD;  Location: [facility];  Service: Pulmonary;  Laterality: Bilateral;    EYE SURGERY      gastric sleeve  02/13/2017    Dr. Watson    INJECTION OF ANESTHETIC AGENT AROUND MULTIPLE INTERCOSTAL NERVES  5/10/2024    Procedure: BLOCK, NERVE, INTERCOSTAL, 2 OR MORE;  Surgeon: Mike Nuñez MD;  Location: [facility];  Service: Cardiothoracic;;    KNEE SURGERY Right     OLECRANON BURSECTOMY Right 07/25/2018    Procedure: BURSECTOMY, OLECRANON;  Surgeon: Jayro Pedraza Sr., MD;  Location: [facility];  Service: Orthopedics;  Laterality: Right;    SURGICAL REMOVAL OF BUNION WITH OSTEOTOMY OF METATARSAL BONE Left 05/10/2019    Procedure: BUNIONECTOMY, WITH METATARSAL OSTEOTOMY;  Surgeon: Srinivasan Villanueva DPM;  Location: [facility] OR;  Service: Podiatry;  Laterality: Left;    SURGICAL REMOVAL OF BUNION WITH OSTEOTOMY OF METATARSAL BONE Right 06/28/2019    Procedure: BUNIONECTOMY, WITH METATARSAL OSTEOTOMY;  Surgeon: Srinivasan Villanueva DPM;  Location: [facility] OR;  Service: Podiatry;  Laterality: Right;    SURGICAL REMOVAL OF LYMPH NODE Left 5/10/2024    Procedure: EXCISION, LYMPH NODE;  Surgeon: Mike Nuñez MD;  Location: [facility];  Service: Cardiothoracic;  Laterality: Left;    TONSILLECTOMY, ADENOIDECTOMY  1980s    TRANSESOPHAGEAL ECHOCARDIOGRAM WITH POSSIBLE CARDIOVERSION (LAKESHIA W/ POSS CARDIOVERSION) N/A 5/15/2024     Procedure: Transesophageal echo (LAKESHIA) intra-procedure log documentation;  Surgeon: Twan Pelaez MD;  Location: [facility] CATH LAB;  Service: Cardiology;  Laterality: N/A;    TRIGGER FINGER RELEASE Right 2015    Dr. Milo HALL ROBOTIC RATS,WITH LOBECTOMY,LUNG Left 5/10/2024    Procedure: RAFAEL ROBOTIC RATS,WITH LOBECTOMY,LUNG;  Surgeon: Mike Nuñez MD;  Location: [facility] OR;  Service: Cardiothoracic;  Laterality: Left;  Lingulectomy     Family History   Problem Relation Name Age of Onset    Prostate cancer Brother      Diabetes Maternal Aunt Alondra Campos     Diabetes Cousin      Hypertension Maternal Grandmother Portia Orosco      Social History     Socioeconomic History    Marital status:     Number of children: 1   Occupational History    Occupation:  aid   Tobacco Use    Smoking status: Former     Current packs/day: 0.00     Average packs/day: 0.5 packs/day for 42.0 years (21.0 ttl pk-yrs)     Types: Cigarettes     Start date: 1970     Quit date: 2012     Years since quittin.0     Passive exposure: Past    Smokeless tobacco: Never   Substance and Sexual Activity    Alcohol use: Not Currently     Alcohol/week: 1.0 standard drink of alcohol     Types: 1 Glasses of wine per week     Comment: Glass red wine once a every 2 weeks    Drug use: Never    Sexual activity: Not Currently     Partners: Female     Birth control/protection: Abstinence, None   Social History Narrative    Single, part-time teacher. Masters degree biology.      Social Drivers of Health     Financial Resource Strain: Patient Declined (2024)    Overall Financial Resource Strain (CARDIA)     Difficulty of Paying Living Expenses: Patient declined   Food Insecurity: Patient Declined (2024)    Hunger Vital Sign     Worried About Running Out of Food in the Last Year: Patient declined     Ran Out of Food in the Last Year: Patient declined   Transportation Needs: No Transportation Needs (2023)     PRAPARE - Transportation     Lack of Transportation (Medical): No     Lack of Transportation (Non-Medical): No   Physical Activity: Insufficiently Active (12/19/2024)    Exercise Vital Sign     Days of Exercise per Week: 3 days     Minutes of Exercise per Session: 20 min   Stress: Stress Concern Present (12/19/2024)    [employer] - Occupational Stress Questionnaire     Feeling of Stress : Very much   Housing Stability: High Risk (12/19/2024)    Housing Stability Vital Sign     Unable to Pay for Housing in the Last Year: Yes     Medication List with Changes/Refills   Current Medications    ALBUTEROL (PROVENTIL/VENTOLIN HFA) 90 MCG/ACTUATION INHALER    INHALE 2 PUFFS INTO THE LUNGS EVERY 4 HOURS AS NEEDED FOR WHEEZING OR SHORTNESS OF BREATH.    AMITRIPTYLINE (ELAVIL) 50 MG TABLET    Take 1 tablet (50 mg total) by mouth every evening.    BLOOD SUGAR DIAGNOSTIC STRP    use to test blood sugar 1-2 times a day    BLOOD-GLUCOSE METER (ACCU-CHEK GUIDE ME GLUCOSE MTR) MISC    use to check blood glucose 2 times a day    BLOOD-GLUCOSE METER (ACCU-CHEK GUIDE ME GLUCOSE MTR) MISC    To check blood glucose 1-2 times daily, to use with insurance preferred meter    ESZOPICLONE (LUNESTA) 3 MG TAB    Take 1 tablet (3 mg total) by mouth every evening.    LANCETS MISC    Use to test blood glucose 1-2 times a day, discard lancet after each use    LORAZEPAM (ATIVAN) 1 MG TABLET    Take 1 tablet (1 mg total) by mouth 2 (two) times daily.    LOSARTAN (COZAAR) 25 MG TABLET    Take 1 tablet (25 mg total) by mouth once daily.    METOPROLOL SUCCINATE (TOPROL-XL) 50 MG 24 HR TABLET    Take 1 tablet (50 mg total) by mouth once daily.    OMEPRAZOLE (PRILOSEC) 20 MG CAPSULE    Take 1 capsule (20 mg total) by mouth once daily.    SEMAGLUTIDE (OZEMPIC) 2 MG/DOSE (8 MG/3 ML) PNIJ    Inject 2 mg into the skin every 7 days.    TRAMADOL (ULTRAM) 50 MG TABLET    Take 1 tablet (50 mg total) by mouth 2 (two) times a day.     TRIAMCINOLONE ACETONIDE 0.025 % LOTN    Apply small amount to affective areas twice a day  take cool showers or baths     Review of patient's allergies indicates:  No Known Allergies  Review of Systems   Constitutional: Negative for malaise/fatigue.   HENT:  Negative for hearing loss.    Eyes:  Negative for double vision and visual disturbance.   Cardiovascular:  Positive for irregular heartbeat. Negative for chest pain.   Respiratory:  Positive for wheezing. Negative for shortness of breath.    Endocrine: Negative for cold intolerance.   Hematologic/Lymphatic: Does not bruise/bleed easily.   Skin:  Negative for poor wound healing and suspicious lesions.   Musculoskeletal:  Positive for arthritis, falls, joint pain and joint swelling. Negative for gout.   Gastrointestinal:  Positive for heartburn. Negative for nausea and vomiting.   Genitourinary:  Negative for dysuria.   Neurological:  Positive for numbness, paresthesias and sensory change.   Psychiatric/Behavioral:  Positive for altered mental status and depression. Negative for memory loss and substance abuse. The patient has insomnia and is nervous/anxious.    Allergic/Immunologic: Negative for persistent infections.       Objective:   Body mass index is 25.54 kg/m².  There were no vitals filed for this visit.             General    Constitutional: She is oriented to person, place, and time. She appears well-developed and well-nourished. No distress.   HENT:   Head: Normocephalic.   Eyes: EOM are normal.   Pulmonary/Chest: Effort normal.   Neurological: She is oriented to person, place, and time.   Psychiatric: She has a normal mood and affect.             Right Hand/Wrist Exam     Inspection   Scars: Wrist - present Hand -  present  Effusion: Wrist - absent Hand -  absent    Pain   Hand - The patient exhibits pain of the index MCP.  Wrist - The patient exhibits pain of the CMC.    Other     Neuorologic Exam    Median Distribution: normal  Ulnar Distribution:  normal  Radial Distribution: normal    Comments:  The patient has well-healed right carpal tunnel scar and de Quervain scar.  She is tender about the basal joint right thumb with a positive axial circumduction grind test.  She also has active triggering right index finger with a palpable nodule that moves with tendon excursion.      Left Hand/Wrist Exam     Inspection   Scars: Wrist - present Hand -  present  Effusion: Wrist - absent Hand -  absent    Pain   Hand - The patient exhibits pain of the index MCP.  Wrist - The patient exhibits pain of the CMC.    Other     Sensory Exam  Median Distribution: normal  Ulnar Distribution: normal  Radial Distribution: normal    Comments:  The patient has a well-healed left carpal tunnel scar and de Quervain scar.  She is tender about the basal joint left thumb with a positive axial circumduction grind test.  She also has active triggering left index finger with a palpable nodule that moves with tendon excursion.          Vascular Exam       Capillary Refill  Right Hand: normal capillary refill  Left Hand: normal capillary refill          Relevant imaging results reviewed and interpreted by me, discussed with the patient and / or family today radiographs were not obtained today  Assessment:     Encounter Diagnoses   Name Primary?    Trigger finger, acquired Yes    Arthritis of carpometacarpal (CMC) joint of both thumbs         Plan:     The patient is injected in both index trigger fingers with 0.5 cc of Kenalog and 0.5 cc of 2% plain lidocaine and in both thumb basal joints each with 0.5 cc of Kenalog and 0.5 cc of 2% plain lidocaine.  She will wait at least 3 months between injections.                Disclaimer: This note was prepared using a voice recognition system and is likely to have sound alike errors within the text.

## 2025-01-08 NOTE — PROCEDURES
Small Joint Aspiration/Injection: R thumb CMC, L thumb CMC    Date/Time: 1/8/2025 2:30 PM    Performed by: Jaime Oswald MD  Authorized by: Jaime Oswald MD    Consent Done?:  Yes (Verbal)  Indications:  Pain  Site marked: the procedure site was marked    Timeout: prior to procedure the correct patient, procedure, and site was verified    Prep: patient was prepped and draped in usual sterile fashion      Local anesthesia used?: Yes    Local anesthetic:  Lidocaine 2% without epinephrine  Anesthetic total (ml):  1    Location:  Thumb  Site:  R thumb CMC and L thumb CMC  Ultrasonic guidance for needle placement?: No    Needle size:  25 G  Approach:  Dorsal  Medications:  40 mg triamcinolone acetonide 40 mg/mL; 40 mg triamcinolone acetonide 40 mg/mL

## 2025-01-08 NOTE — PROCEDURES
Tendon Sheath    Date/Time: 1/8/2025 2:30 PM    Performed by: Jaime Oswald MD  Authorized by: Jaime Oswald MD    Consent Done?:  Yes (Verbal)  Indications:  Pain  Timeout: prior to procedure the correct patient, procedure, and site was verified    Prep: patient was prepped and draped in usual sterile fashion      Local anesthesia used?: Yes    Local anesthetic:  Lidocaine 2% without epinephrine  Anesthetic total (ml):  0.5    Location:  Index finger  Site:  R index flexor tendon sheath and L index flexor tendon sheath  Ultrasonic guidance for needle placement?: Yes    Needle size:  25 G  Approach:  Volar  Medications:  40 mg triamcinolone acetonide 40 mg/mL; 40 mg triamcinolone acetonide 40 mg/mL

## 2025-01-10 ENCOUNTER — OFFICE VISIT (OUTPATIENT)
Dept: CARDIOLOGY | Facility: CLINIC | Age: 75
End: 2025-01-10
Payer: MEDICARE

## 2025-01-10 DIAGNOSIS — E11.69 HYPERLIPIDEMIA ASSOCIATED WITH TYPE 2 DIABETES MELLITUS: Chronic | ICD-10-CM

## 2025-01-10 DIAGNOSIS — I47.10 PAROXYSMAL SVT (SUPRAVENTRICULAR TACHYCARDIA): ICD-10-CM

## 2025-01-10 DIAGNOSIS — E66.01 CLASS 2 SEVERE OBESITY DUE TO EXCESS CALORIES WITH SERIOUS COMORBIDITY IN ADULT, UNSPECIFIED BMI: ICD-10-CM

## 2025-01-10 DIAGNOSIS — I48.3 TYPICAL ATRIAL FLUTTER: ICD-10-CM

## 2025-01-10 DIAGNOSIS — E11.9 CONTROLLED TYPE 2 DIABETES MELLITUS WITHOUT COMPLICATION, WITH LONG-TERM CURRENT USE OF INSULIN: ICD-10-CM

## 2025-01-10 DIAGNOSIS — R94.8 ABNORMAL PET SCAN, MEDIASTINUM: ICD-10-CM

## 2025-01-10 DIAGNOSIS — I70.0 CALCIFICATION OF AORTA: ICD-10-CM

## 2025-01-10 DIAGNOSIS — E66.812 CLASS 2 SEVERE OBESITY DUE TO EXCESS CALORIES WITH SERIOUS COMORBIDITY IN ADULT, UNSPECIFIED BMI: ICD-10-CM

## 2025-01-10 DIAGNOSIS — Z79.4 CONTROLLED TYPE 2 DIABETES MELLITUS WITHOUT COMPLICATION, WITH LONG-TERM CURRENT USE OF INSULIN: ICD-10-CM

## 2025-01-10 DIAGNOSIS — I10 PRIMARY HYPERTENSION: ICD-10-CM

## 2025-01-10 DIAGNOSIS — Z01.810 PREOP CARDIOVASCULAR EXAM: Primary | ICD-10-CM

## 2025-01-10 DIAGNOSIS — E78.5 HYPERLIPIDEMIA ASSOCIATED WITH TYPE 2 DIABETES MELLITUS: Chronic | ICD-10-CM

## 2025-01-10 DIAGNOSIS — C34.32 MALIGNANT NEOPLASM OF LOWER LOBE OF LEFT LUNG: ICD-10-CM

## 2025-01-10 DIAGNOSIS — R59.0 HILAR ADENOPATHY: ICD-10-CM

## 2025-01-10 DIAGNOSIS — I70.0 AORTIC ATHEROSCLEROSIS: ICD-10-CM

## 2025-01-10 RX ORDER — ASPIRIN 81 MG/1
81 TABLET ORAL DAILY
Start: 2025-01-10

## 2025-01-10 NOTE — PROGRESS NOTES
Subjective:   Patient ID:  Cassandra Campos is a 74 y.o. female who presents for follow up of No chief complaint on file.      72 yo, female, came in for 6 preop clearance of bronchoscopy  PMH PSVT, pAFL post op, HTN, HLP, NIDDM 10 yrs, obesity, ex smoker, DANIEL, not on CPAP. H/o lung Ca s/p left robotic lingual lobectomy and mediastinal lymph node dissection for a high-grade adenocarcinoma in 05/24.     No h/o MI CVA  Had bariatric surgery done in . Uncomplicated and last 30 pounds.  Walks 2 miles daily.   No smoking and occasional drink  Lives alone and no limitation of exercise  Patient feels OK, no chest pain, no sob, no palpitation, no dizziness, no syncope, no CNS symptoms.    04/24 visit  Plan robotic lobectomy for lung ca. Quit smoking 20 yrs ago.  Walks at home. No chest pain dizziness orthopnea . On inhaler as needed  EKG reviewed by myself today reveals NSR nonspecific STT change sinus tachy and LVH.  No change compared to prior one in 2020.    06/24 visit  S/p Left robotic lingual lobectomy and mediastinal lymph node dissection for a high-grade adenocarcinoma on 05/10/24  Post OP developed afl. And had LAKESHIA and DCCV converted to SR  TODAY EKG NSR  Oncology eval pending  No palpitation dizziness and chest pain   No orthopnea   and BP C    12/24 visit  Repeat vitals in 08/24 and no AFL. d/c eliquis.  Occasional dizziness. No faint chest pain palpitation  No coffee and energy drinking   and BP C    Interval history telenote  Plan bronch by Dr. Robin r/o ca  No chest pain dizziness orthopnea dyspnea                                         Past Medical History:   Diagnosis Date    Arthritis     hands    Bilateral bunions     Borderline glaucoma     De Quervain's disease (radial styloid tenosynovitis)     Gastritis     upper GI 2/2017    Hydradenitis     Hyperlipidemia     Hypertension     Insomnia     Migraines 02/01/2000    Nasal septum perforation     Obesity     Pneumonia      Restrictive airway disease     Sleep apnea     SVT (supraventricular tachycardia) 2013    Trigger finger     Type 2 diabetes mellitus 2012     am 2024       Past Surgical History:   Procedure Laterality Date    AXILLARY HIDRADENITIS EXCISION Bilateral     BONE EXOSTOSIS EXCISION Right 2018    Procedure: EXCISION, EXOSTOSIS;  Surgeon: Jayro Pedraza Sr., MD;  Location: [facility];  Service: Orthopedics;  Laterality: Right;    BREAST BIOPSY Bilateral     both benign    BREAST SURGERY  1998    CARPAL TUNNEL RELEASE      bilateral    CARPAL TUNNEL RELEASE Right 2024    Procedure: RELEASE, CARPAL TUNNEL;  Surgeon: Jaime Oswald MD;  Location: [facility];  Service: Orthopedics;  Laterality: Right;    CARPAL TUNNEL RELEASE Left 2024    Procedure: RELEASE, CARPAL TUNNEL;  Surgeon: Jaime Oswald MD;  Location: [facility];  Service: Orthopedics;  Laterality: Left;    CATARACT EXTRACTION Bilateral     OU     SECTION  1979    CHOLECYSTECTOMY  2014    COLONOSCOPY N/A 10/02/2020    Procedure: COLONOSCOPY;  Surgeon: Tushar Edwards MD;  Location: [facility];  Service: Endoscopy;  Laterality: N/A;    COLONOSCOPY W/ POLYPECTOMY  10/02/2020    Polyps x3, repeat 5 years; Tushar Edwards MD     CYST REMOVAL  2015    sebaceous cyst removed from face    DE QUERVAIN'S RELEASE Left 2020    Procedure: RELEASE, HAND, FOR DEQUERVAIN'S TENOSYNOVITIS;  Surgeon: Jaime Oswald MD;  Location: [facility];  Service: Orthopedics;  Laterality: Left;    DE QUERVAIN'S RELEASE Right 2020    Procedure: RELEASE, HAND, FOR DEQUERVAIN'S TENOSYNOVITIS;  Surgeon: Jaime Oswald MD;  Location: [facility];  Service: Orthopedics;  Laterality: Right;    ENDOBRONCHIAL ULTRASOUND Bilateral 04/10/2024    Procedure: ENDOBRONCHIAL ULTRASOUND (EBUS);  Surgeon: Adrian Robin MD;  Location: [facility];  Service: Pulmonary;  Laterality: Bilateral;    EYE SURGERY      gastric sleeve   2017    Dr. Watson    INJECTION OF ANESTHETIC AGENT AROUND MULTIPLE INTERCOSTAL NERVES  5/10/2024    Procedure: BLOCK, NERVE, INTERCOSTAL, 2 OR MORE;  Surgeon: Mike Nuñez MD;  Location: [facility] OR;  Service: Cardiothoracic;;    KNEE SURGERY Right     OLECRANON BURSECTOMY Right 2018    Procedure: BURSECTOMY, OLECRANON;  Surgeon: Jayro Pedraza Sr., MD;  Location: [facility] OR;  Service: Orthopedics;  Laterality: Right;    SURGICAL REMOVAL OF BUNION WITH OSTEOTOMY OF METATARSAL BONE Left 05/10/2019    Procedure: BUNIONECTOMY, WITH METATARSAL OSTEOTOMY;  Surgeon: Srinivasan Villanueva DPM;  Location: [facility] OR;  Service: Podiatry;  Laterality: Left;    SURGICAL REMOVAL OF BUNION WITH OSTEOTOMY OF METATARSAL BONE Right 2019    Procedure: BUNIONECTOMY, WITH METATARSAL OSTEOTOMY;  Surgeon: Srinivasan Villanueva DPM;  Location: [facility] OR;  Service: Podiatry;  Laterality: Right;    SURGICAL REMOVAL OF LYMPH NODE Left 5/10/2024    Procedure: EXCISION, LYMPH NODE;  Surgeon: Mike Nuñez MD;  Location: [facility] OR;  Service: Cardiothoracic;  Laterality: Left;    TONSILLECTOMY, ADENOIDECTOMY  1980s    TRANSESOPHAGEAL ECHOCARDIOGRAM WITH POSSIBLE CARDIOVERSION (LAKESHIA W/ POSS CARDIOVERSION) N/A 5/15/2024    Procedure: Transesophageal echo (LAKESHIA) intra-procedure log documentation;  Surgeon: Twan Pelaez MD;  Location: [facility] CATH LAB;  Service: Cardiology;  Laterality: N/A;    TRIGGER FINGER RELEASE Right 2015    Dr. Pedraza    XI ROBOTIC RATS,WITH LOBECTOMY,LUNG Left 5/10/2024    Procedure: XI ROBOTIC RATS,WITH LOBECTOMY,LUNG;  Surgeon: Mike Nuñez MD;  Location: [facility] OR;  Service: Cardiothoracic;  Laterality: Left;  Lingulectomy       Social History     Tobacco Use    Smoking status: Former     Current packs/day: 0.00     Average packs/day: 0.5 packs/day for 42.0 years (21.0 ttl pk-yrs)     Types: Cigarettes     Start date: 1970     Quit date: 2012     Years since quittin.0     Passive exposure: Past     Smokeless tobacco: Never   Substance Use Topics    Alcohol use: Not Currently     Alcohol/week: 1.0 standard drink of alcohol     Types: 1 Glasses of wine per week     Comment: Glass red wine once a every 2 weeks    Drug use: Never       Family History   Problem Relation Name Age of Onset    Prostate cancer Brother      Diabetes Maternal Aunt Alondra Campos     Diabetes Cousin      Hypertension Maternal Grandmother Portia TASI    Objective:   Physical Exam    Lab Results   Component Value Date    CHOL 184 12/26/2023    CHOL 190 09/12/2022    CHOL 208 (H) 04/13/2022     Lab Results   Component Value Date    HDL 48 12/26/2023    HDL 39 (L) 09/12/2022    HDL 37 (L) 04/13/2022     Lab Results   Component Value Date    LDLCALC 110.4 12/26/2023    LDLCALC 114.6 09/12/2022    LDLCALC 129.0 04/13/2022     Lab Results   Component Value Date    TRIG 128 12/26/2023    TRIG 182 (H) 09/12/2022    TRIG 210 (H) 04/13/2022     Lab Results   Component Value Date    CHOLHDL 26.1 12/26/2023    CHOLHDL 20.5 09/12/2022    CHOLHDL 17.8 (L) 04/13/2022       Chemistry        Component Value Date/Time     12/11/2024 0919    K 3.7 12/11/2024 0919     12/11/2024 0919    CO2 22 (L) 12/11/2024 0919    BUN 5 (L) 12/11/2024 0919    CREATININE 0.6 12/11/2024 0919    GLU 85 12/11/2024 0919        Component Value Date/Time    CALCIUM 9.4 12/11/2024 0919    ALKPHOS 96 12/11/2024 0919    AST 14 12/11/2024 0919    ALT 19 12/11/2024 0919    BILITOT 0.3 12/11/2024 0919    ESTGFRAFRICA >60 06/21/2022 0935    EGFRNONAA >60 06/21/2022 0935          Lab Results   Component Value Date    HGBA1C 5.7 (H) 08/28/2024     Lab Results   Component Value Date    TSH 3.220 09/12/2022     Lab Results   Component Value Date    INR 1.0 12/11/2024    INR 1.1 05/14/2024    INR 0.9 04/04/2024     Lab Results   Component Value Date    WBC 5.21 12/11/2024    HGB 12.6 12/11/2024    HCT 38.5 12/11/2024    MCV 71 (L) 12/11/2024     12/11/2024      BMP  Sodium   Date Value Ref Range Status   12/11/2024 138 136 - 145 mmol/L Final     Potassium   Date Value Ref Range Status   12/11/2024 3.7 3.5 - 5.1 mmol/L Final     Chloride   Date Value Ref Range Status   12/11/2024 106 95 - 110 mmol/L Final     CO2   Date Value Ref Range Status   12/11/2024 22 (L) 23 - 29 mmol/L Final     BUN   Date Value Ref Range Status   12/11/2024 5 (L) 8 - 23 mg/dL Final     Creatinine   Date Value Ref Range Status   12/11/2024 0.6 0.5 - 1.4 mg/dL Final     Calcium   Date Value Ref Range Status   12/11/2024 9.4 8.7 - 10.5 mg/dL Final     Anion Gap   Date Value Ref Range Status   12/11/2024 10 8 - 16 mmol/L Final     eGFR if    Date Value Ref Range Status   06/21/2022 >60 >60 mL/min/1.73 m^2 Final     eGFR if non    Date Value Ref Range Status   06/21/2022 >60 >60 mL/min/1.73 m^2 Final     Comment:     Calculation used to obtain the estimated glomerular filtration  rate (eGFR) is the CKD-EPI equation.        BNP  @LABRCNTIP(BNP,BNPTRIAGEBLO)@  @LABRCNTIP(troponini)@  CrCl cannot be calculated (Patient's most recent lab result is older than the maximum 7 days allowed.).  No results found in the last 24 hours.  No results found in the last 24 hours.  No results found in the last 24 hours.    Assessment:      1. Preop cardiovascular exam    2. Abnormal PET scan, mediastinum    3. Hilar adenopathy    4. Class 2 severe obesity due to excess calories with serious comorbidity in adult, unspecified BMI    5. Typical atrial flutter    6. Aortic atherosclerosis    7. Calcification of aorta    8. Hyperlipidemia associated with type 2 diabetes mellitus    9. Paroxysmal SVT (supraventricular tachycardia)    10. Primary hypertension    11. Malignant neoplasm of lower lobe of left lung    12. Controlled type 2 diabetes mellitus without complication, with long-term current use of insulin        Plan:     Elevated periop risk of CV events for non-high risk  procedure.  Good functional and exercise capacity.  No chest pain, active arrhythmia and CHF symptoms.  Ok to proceed the scheduled surgery without further cardiac study.  OK to hold Aspirin 5 to 7 days before the procedure and resume ASAP postop.      Continue current Rx  Rtc as scheduled      The patient location is: home  The chief complaint leading to consultation is: preop clearance    Visit type: audiovisual    Face to Face time with patient: 15 minutes of total time spent on the encounter, which includes face to face time and non-face to face time preparing to see the patient (eg, review of tests), Obtaining and/or reviewing separately obtained history, Documenting clinical information in the electronic or other health record, Independently interpreting results (not separately reported) and communicating results to the patient/family/caregiver, or Care coordination (not separately reported).         Each patient to whom he or she provides medical services by telemedicine is:  (1) informed of the relationship between the physician and patient and the respective role of any other health care provider with respect to management of the patient; and (2) notified that he or she may decline to receive medical services by telemedicine and may withdraw from such care at any time.    Notes:

## 2025-01-15 ENCOUNTER — ANESTHESIA EVENT (OUTPATIENT)
Dept: ENDOSCOPY | Facility: HOSPITAL | Age: 75
End: 2025-01-15
Payer: MEDICARE

## 2025-01-15 ENCOUNTER — HOSPITAL ENCOUNTER (OUTPATIENT)
Facility: HOSPITAL | Age: 75
Discharge: HOME | End: 2025-01-15
Attending: INTERNAL MEDICINE | Admitting: INTERNAL MEDICINE
Payer: MEDICARE

## 2025-01-15 ENCOUNTER — ANESTHESIA (OUTPATIENT)
Dept: ENDOSCOPY | Facility: HOSPITAL | Age: 75
End: 2025-01-15
Payer: MEDICARE

## 2025-01-15 DIAGNOSIS — R94.8 ABNORMAL PET SCAN, MEDIASTINUM: ICD-10-CM

## 2025-01-15 DIAGNOSIS — E11.65 TYPE 2 DIABETES MELLITUS WITH HYPERGLYCEMIA, WITHOUT LONG-TERM CURRENT USE OF INSULIN: ICD-10-CM

## 2025-01-15 DIAGNOSIS — E11.9 CONTROLLED TYPE 2 DIABETES MELLITUS WITHOUT COMPLICATION, WITHOUT LONG-TERM CURRENT USE OF INSULIN: ICD-10-CM

## 2025-01-15 LAB
APPEARANCE FLD: NORMAL
BODY FLD TYPE: NORMAL
COLOR FLD: COLORLESS
KOH PREP SPEC: NORMAL
LYMPHOCYTES NFR FLD MANUAL: 26 %
MESOTHL CELL NFR FLD MANUAL: 1 %
MONOS+MACROS NFR FLD MANUAL: 63 %
NEUTROPHILS NFR FLD MANUAL: 10 %
POCT GLUCOSE: 100 MG/DL (ref 70–110)
WBC # FLD: 55 /CU MM

## 2025-01-15 PROCEDURE — 87106 FUNGI IDENTIFICATION YEAST: CPT | Performed by: INTERNAL MEDICINE

## 2025-01-15 PROCEDURE — 88173 CYTOPATH EVAL FNA REPORT: CPT | Mod: 26,,, | Performed by: PATHOLOGY

## 2025-01-15 PROCEDURE — 63600175 PHARM REV CODE 636 W HCPCS: Performed by: NURSE ANESTHETIST, CERTIFIED REGISTERED

## 2025-01-15 PROCEDURE — 88112 CYTOPATH CELL ENHANCE TECH: CPT | Performed by: PATHOLOGY

## 2025-01-15 PROCEDURE — 63600175 PHARM REV CODE 636 W HCPCS: Performed by: INTERNAL MEDICINE

## 2025-01-15 PROCEDURE — 88173 CYTOPATH EVAL FNA REPORT: CPT | Performed by: PATHOLOGY

## 2025-01-15 PROCEDURE — 31652 BRONCH EBUS SAMPLNG 1/2 NODE: CPT | Performed by: INTERNAL MEDICINE

## 2025-01-15 PROCEDURE — 88341 IMHCHEM/IMCYTCHM EA ADD ANTB: CPT | Mod: 59 | Performed by: PATHOLOGY

## 2025-01-15 PROCEDURE — 37000008 HC ANESTHESIA 1ST 15 MINUTES: Performed by: INTERNAL MEDICINE

## 2025-01-15 PROCEDURE — 88342 IMHCHEM/IMCYTCHM 1ST ANTB: CPT | Performed by: PATHOLOGY

## 2025-01-15 PROCEDURE — 87070 CULTURE OTHR SPECIMN AEROBIC: CPT | Performed by: INTERNAL MEDICINE

## 2025-01-15 PROCEDURE — 88341 IMHCHEM/IMCYTCHM EA ADD ANTB: CPT | Mod: 26,,, | Performed by: PATHOLOGY

## 2025-01-15 PROCEDURE — 25000003 PHARM REV CODE 250: Performed by: NURSE ANESTHETIST, CERTIFIED REGISTERED

## 2025-01-15 PROCEDURE — 88112 CYTOPATH CELL ENHANCE TECH: CPT | Mod: 26,59,, | Performed by: PATHOLOGY

## 2025-01-15 PROCEDURE — 87186 SC STD MICRODIL/AGAR DIL: CPT | Performed by: INTERNAL MEDICINE

## 2025-01-15 PROCEDURE — 88305 TISSUE EXAM BY PATHOLOGIST: CPT | Performed by: PATHOLOGY

## 2025-01-15 PROCEDURE — 31624 DX BRONCHOSCOPE/LAVAGE: CPT | Mod: 51,,, | Performed by: INTERNAL MEDICINE

## 2025-01-15 PROCEDURE — 27202059 HC NEEDLE, FNA (ANY): Performed by: INTERNAL MEDICINE

## 2025-01-15 PROCEDURE — 31624 DX BRONCHOSCOPE/LAVAGE: CPT | Performed by: INTERNAL MEDICINE

## 2025-01-15 PROCEDURE — 87077 CULTURE AEROBIC IDENTIFY: CPT | Mod: 59 | Performed by: INTERNAL MEDICINE

## 2025-01-15 PROCEDURE — 87015 SPECIMEN INFECT AGNT CONCNTJ: CPT | Performed by: INTERNAL MEDICINE

## 2025-01-15 PROCEDURE — 87116 MYCOBACTERIA CULTURE: CPT | Performed by: INTERNAL MEDICINE

## 2025-01-15 PROCEDURE — 88342 IMHCHEM/IMCYTCHM 1ST ANTB: CPT | Mod: 26,,, | Performed by: PATHOLOGY

## 2025-01-15 PROCEDURE — 87205 SMEAR GRAM STAIN: CPT | Performed by: INTERNAL MEDICINE

## 2025-01-15 PROCEDURE — 88305 TISSUE EXAM BY PATHOLOGIST: CPT | Mod: 26,,, | Performed by: PATHOLOGY

## 2025-01-15 PROCEDURE — 31652 BRONCH EBUS SAMPLNG 1/2 NODE: CPT | Mod: ,,, | Performed by: INTERNAL MEDICINE

## 2025-01-15 PROCEDURE — 87102 FUNGUS ISOLATION CULTURE: CPT | Performed by: INTERNAL MEDICINE

## 2025-01-15 PROCEDURE — 87210 SMEAR WET MOUNT SALINE/INK: CPT | Performed by: INTERNAL MEDICINE

## 2025-01-15 PROCEDURE — 37000009 HC ANESTHESIA EA ADD 15 MINS: Performed by: INTERNAL MEDICINE

## 2025-01-15 PROCEDURE — 87206 SMEAR FLUORESCENT/ACID STAI: CPT | Performed by: INTERNAL MEDICINE

## 2025-01-15 PROCEDURE — 89051 BODY FLUID CELL COUNT: CPT | Performed by: INTERNAL MEDICINE

## 2025-01-15 RX ORDER — LIDOCAINE HYDROCHLORIDE 10 MG/ML
INJECTION, SOLUTION EPIDURAL; INFILTRATION; INTRACAUDAL; PERINEURAL
Status: DISCONTINUED | OUTPATIENT
Start: 2025-01-15 | End: 2025-01-15

## 2025-01-15 RX ORDER — PROPOFOL 10 MG/ML
VIAL (ML) INTRAVENOUS
Status: DISCONTINUED | OUTPATIENT
Start: 2025-01-15 | End: 2025-01-15

## 2025-01-15 RX ORDER — ROCURONIUM BROMIDE 10 MG/ML
INJECTION, SOLUTION INTRAVENOUS
Status: DISCONTINUED | OUTPATIENT
Start: 2025-01-15 | End: 2025-01-15

## 2025-01-15 RX ORDER — LIDOCAINE HYDROCHLORIDE 10 MG/ML
20 INJECTION, SOLUTION EPIDURAL; INFILTRATION; INTRACAUDAL; PERINEURAL ONCE
Status: COMPLETED | OUTPATIENT
Start: 2025-01-15 | End: 2025-01-15

## 2025-01-15 RX ORDER — SODIUM CHLORIDE 9 MG/ML
INJECTION, SOLUTION INTRAVENOUS CONTINUOUS
Status: DISCONTINUED | OUTPATIENT
Start: 2025-01-15 | End: 2025-01-15 | Stop reason: HOSPADM

## 2025-01-15 RX ORDER — ONDANSETRON HYDROCHLORIDE 2 MG/ML
INJECTION, SOLUTION INTRAVENOUS
Status: DISCONTINUED | OUTPATIENT
Start: 2025-01-15 | End: 2025-01-15

## 2025-01-15 RX ADMIN — SUGAMMADEX 200 MG: 100 INJECTION, SOLUTION INTRAVENOUS at 08:01

## 2025-01-15 RX ADMIN — ROCURONIUM BROMIDE 50 MG: 10 INJECTION, SOLUTION INTRAVENOUS at 07:01

## 2025-01-15 RX ADMIN — ONDANSETRON 4 MG: 2 INJECTION INTRAMUSCULAR; INTRAVENOUS at 07:01

## 2025-01-15 RX ADMIN — PROPOFOL 120 MG: 10 INJECTION, EMULSION INTRAVENOUS at 07:01

## 2025-01-15 RX ADMIN — LIDOCAINE HYDROCHLORIDE 50 MG: 10 SOLUTION INTRAVENOUS at 07:01

## 2025-01-15 NOTE — OP NOTE
Brought into operating room   Procedure consents completed   Final procedure time-out completed  Intubated with 8.5 ET tube   Airway inspection scanty secretions   BA right middle lobe   EBUS Station 7: 7.8 mm: 3 passes    Stations 10 L:  12.5 mm: 4 passes:  Patient extubated addendum procedure minimal blood loss   Patient willing to PACU area

## 2025-01-15 NOTE — DISCHARGE SUMMARY
[facility] ([facility])  Discharge Note  Short Stay    Procedure(s) (LRB):  Bronchoscopy - insert lighted tube into airway to take a biopsy of lung (Bilateral)  ENDOBRONCHIAL ULTRASOUND (EBUS) (Bilateral)      OUTCOME: Patient tolerated treatment/procedure well without complication and is now ready for discharge.    DISPOSITION: Home or Self Care    FINAL DIAGNOSIS:  Abnormal PET scan, mediastinum    FOLLOWUP: In clinic    DISCHARGE INSTRUCTIONS:  No discharge procedures on file.     TIME SPENT ON DISCHARGE: 15 minutes

## 2025-01-15 NOTE — ANESTHESIA PREPROCEDURE EVALUATION
01/15/2025  Cassandra Campos is a 74 y.o., female.      Pre-op Assessment    I have reviewed the Patient Summary Reports.     I have reviewed the Nursing Notes. I have reviewed the NPO Status.   I have reviewed the Medications.     Review of Systems  Anesthesia Hx:  No problems with previous Anesthesia                Social:  Former Smoker, No Alcohol Use       Hematology/Oncology:  Hematology Normal   Oncology Normal                                   EENT/Dental:  EENT/Dental Normal           Cardiovascular:     Hypertension, well controlled           hyperlipidemia                               Pulmonary:  Pneumonia  Asthma moderate   Sleep Apnea, CPAP                Renal/:  Renal/ Normal                 Hepatic/GI:     GERD, poorly controlled                Musculoskeletal:  Arthritis               Neurological:    Neuromuscular Disease,  Headaches                                 Endocrine:  Diabetes, well controlled, type 2           Dermatological:  Skin Normal    Psych:  Psychiatric History anxiety depression Sleep Disorder and Insomnia.       Sleep Disorder and Insomnia.        Physical Exam  General: Well nourished    Airway:  Mallampati: II   Mouth Opening: Normal  TM Distance: Normal  Tongue: Normal  Neck ROM: Normal ROM    Dental:  Intact    Chest/Lungs:  Clear to auscultation    Heart:  Rate: Normal        Anesthesia Plan  Type of Anesthesia, risks & benefits discussed:    Anesthesia Type: Gen ETT  Intra-op Monitoring Plan: Standard ASA Monitors  Induction:  IV  Informed Consent: Informed consent signed with the Patient and all parties understand the risks and agree with anesthesia plan.  All questions answered. Patient consented to blood products? Yes  ASA Score: 3    Ready For Surgery From Anesthesia Perspective.     .

## 2025-01-15 NOTE — ANESTHESIA POSTPROCEDURE EVALUATION
Anesthesia Post Evaluation    Patient: Cassandra Campos    Procedure(s) Performed: Procedure(s) (LRB):  Bronchoscopy - insert lighted tube into airway to take a biopsy of lung (Bilateral)  ENDOBRONCHIAL ULTRASOUND (EBUS) (Bilateral)    Final Anesthesia Type: general      Patient location during evaluation: PACU  Patient participation: Yes- Able to Participate  Level of consciousness: awake and alert and awake  Post-procedure vital signs: reviewed and stable  Pain management: adequate  Airway patency: patent  DANIEL mitigation strategies: Multimodal analgesia  PONV status at discharge: No PONV  Anesthetic complications: no      Cardiovascular status: blood pressure returned to baseline  Respiratory status: spontaneous ventilation and room air  Hydration status: euvolemic  Follow-up not needed.              Vitals Value Taken Time   /68 01/15/25 0831   Temp 36.6 °C (97.9 °F) 01/15/25 0831   Pulse 85 01/15/25 0831   Resp 12 01/15/25 0841   SpO2 96 % 01/15/25 0831         No case tracking events are documented in the log.      Pain/Francheska Score: Francheska Score: 9 (1/15/2025  8:31 AM)

## 2025-01-15 NOTE — ANESTHESIA PROCEDURE NOTES
Intubation    Date/Time: 1/15/2025 7:35 AM    Performed by: Venu Lopez CRNA  Authorized by: Naman Gonzalez II, MD    Intubation:     Induction:  Intravenous    Intubated:  Postinduction    Mask Ventilation:  Not attempted    Attempts:  1    Attempted By:  CRNA    Method of Intubation:  Direct    Blade:  Piyush 3    Laryngeal View Grade: Grade I - full view of cords      Difficult Airway Encountered?: No      Complications:  None    Airway Device:  Oral endotracheal tube    Airway Device Size:  8.5    Style/Cuff Inflation:  Cuffed (inflated to minimal occlusive pressure)    Inflation Amount (mL):  8    Tube secured:  21    Secured at:  The lips    Placement Verified By:  Capnometry    Complicating Factors:  None    Findings Post-Intubation:  BS equal bilateral

## 2025-01-15 NOTE — TRANSFER OF CARE
Anesthesia Transfer of Care Note    Patient: Cassandra Campos    Procedure(s) Performed: Procedure(s) (LRB):  Bronchoscopy - insert lighted tube into airway to take a biopsy of lung (Bilateral)  ENDOBRONCHIAL ULTRASOUND (EBUS) (Bilateral)    Patient location: PACU    Anesthesia Type: MAC and general    Transport from OR: Transported from OR on room air with adequate spontaneous ventilation    Post pain: adequate analgesia    Post assessment: no apparent anesthetic complications    Post vital signs: stable    Level of consciousness: awake and alert    Nausea/Vomiting: no nausea/vomiting    Complications: none    Transfer of care protocol was followed      Last vitals: Visit Vitals  /68 (BP Location: Left arm, Patient Position: Sitting)   Pulse 85   Temp 36.6 °C (97.9 °F) (Skin)   Resp 18   SpO2 96%   Breastfeeding No

## 2025-01-15 NOTE — PLAN OF CARE
PATIENT LISA. ICE WITHOUT PROBLEMS. PIV DC'D PATIENT UP AND DRESSED, NO NOTED DISTRESS O2 WAS WEANED OFF. DR TURNER  AT BEDSIDE TO SPEAK TO PT. REGARDING RESULTS.  VSS, NO RESP. DISTRESS NOTED NO N/V. PT. DISCHARGED FROM UNIT PER W/C PER THIS RN. ASSISTED TO CAR AND CARE RELEASED TO PATIENTS SISTER, NO DISTRESS NOTED.

## 2025-01-16 VITALS
HEART RATE: 82 BPM | OXYGEN SATURATION: 94 % | DIASTOLIC BLOOD PRESSURE: 75 MMHG | TEMPERATURE: 98 F | SYSTOLIC BLOOD PRESSURE: 143 MMHG | RESPIRATION RATE: 15 BRPM

## 2025-01-16 LAB
ACID FAST MOD KINY STN SPEC: NORMAL
MYCOBACTERIUM SPEC QL CULT: NORMAL

## 2025-01-16 RX ORDER — SEMAGLUTIDE 2.68 MG/ML
2 INJECTION, SOLUTION SUBCUTANEOUS
Qty: 9 ML | Refills: 0 | Status: SHIPPED | OUTPATIENT
Start: 2025-01-16

## 2025-01-16 NOTE — TELEPHONE ENCOUNTER
Refill Decision Note   Cassandra Campos  is requesting a refill authorization.  Brief Assessment and Rationale for Refill:  Approve     Medication Therapy Plan:         Comments:     Note composed:9:28 AM 01/16/2025

## 2025-01-18 LAB
BACTERIA SPEC AEROBE CULT: ABNORMAL
GRAM STN SPEC: ABNORMAL
GRAM STN SPEC: ABNORMAL

## 2025-01-20 ENCOUNTER — TELEPHONE (OUTPATIENT)
Dept: PULMONOLOGY | Facility: HOSPITAL | Age: 75
End: 2025-01-20
Payer: MEDICARE

## 2025-01-20 ENCOUNTER — PATIENT MESSAGE (OUTPATIENT)
Dept: HEMATOLOGY/ONCOLOGY | Facility: CLINIC | Age: 75
End: 2025-01-20
Payer: MEDICARE

## 2025-01-20 ENCOUNTER — PATIENT MESSAGE (OUTPATIENT)
Dept: PULMONOLOGY | Facility: CLINIC | Age: 75
End: 2025-01-20
Payer: MEDICARE

## 2025-01-20 DIAGNOSIS — B95.8 STAPHYLOCOCCAL INFECTION: Primary | ICD-10-CM

## 2025-01-20 RX ORDER — SULFAMETHOXAZOLE AND TRIMETHOPRIM 800; 160 MG/1; MG/1
1 TABLET ORAL 2 TIMES DAILY
Qty: 20 TABLET | Refills: 0 | Status: SHIPPED | OUTPATIENT
Start: 2025-01-20 | End: 2025-01-30

## 2025-01-20 NOTE — TELEPHONE ENCOUNTER
Specimens were positive for staph  Bactrim sent to pharmacy       Requested Prescriptions     Signed Prescriptions Disp Refills    sulfamethoxazole-trimethoprim 800-160mg (BACTRIM DS) 800-160 mg Tab 20 tablet 0     Sig: Take 1 tablet by mouth 2 (two) times daily. for 10 days     Authorizing Provider: IRAIS TURNER

## 2025-01-21 ENCOUNTER — TELEPHONE (OUTPATIENT)
Dept: HEMATOLOGY/ONCOLOGY | Facility: CLINIC | Age: 75
End: 2025-01-21
Payer: MEDICARE

## 2025-01-24 DIAGNOSIS — F41.0 PANIC ATTACK AS REACTION TO STRESS: ICD-10-CM

## 2025-01-24 DIAGNOSIS — F43.0 PANIC ATTACK AS REACTION TO STRESS: ICD-10-CM

## 2025-01-24 DIAGNOSIS — G47.00 INSOMNIA, UNSPECIFIED TYPE: ICD-10-CM

## 2025-01-24 LAB — FUNGUS SPEC CULT: ABNORMAL

## 2025-01-24 NOTE — TELEPHONE ENCOUNTER
Care Due:                  Date            Visit Type   Department     Provider  --------------------------------------------------------------------------------                                EP -                              PRIMARY      [facility] INTERNAL  Last Visit: 12-      CARE ([facility])   ANA Zhang                              EP -                              PRIMARY      [facility] INTERNAL  Next Visit: 04-      CARE ([facility])   ANA Zhang                                                            Last  Test          Frequency    Reason                     Performed    Due Date  --------------------------------------------------------------------------------    HBA1C.......  6 months...  semaglutide..............  08- 02-    [facility] Embedded Care Due Messages. Reference number: [id number].   1/24/2025 4:36:58 PM CST

## 2025-01-25 RX ORDER — ESZOPICLONE 3 MG/1
3 TABLET, FILM COATED ORAL NIGHTLY
Qty: 30 TABLET | Refills: 1 | Status: SHIPPED | OUTPATIENT
Start: 2025-01-25

## 2025-01-25 RX ORDER — LORAZEPAM 1 MG/1
1 TABLET ORAL 2 TIMES DAILY
Qty: 60 TABLET | Refills: 1 | Status: SHIPPED | OUTPATIENT
Start: 2025-01-25

## 2025-01-27 ENCOUNTER — TELEPHONE (OUTPATIENT)
Dept: HEMATOLOGY/ONCOLOGY | Facility: CLINIC | Age: 75
End: 2025-01-27
Payer: MEDICARE

## 2025-01-27 ENCOUNTER — TELEPHONE (OUTPATIENT)
Dept: SLEEP MEDICINE | Facility: CLINIC | Age: 75
End: 2025-01-27
Payer: MEDICARE

## 2025-01-27 ENCOUNTER — OFFICE VISIT (OUTPATIENT)
Dept: PAIN MEDICINE | Facility: CLINIC | Age: 75
End: 2025-01-27
Payer: MEDICARE

## 2025-01-27 VITALS
HEART RATE: 75 BPM | SYSTOLIC BLOOD PRESSURE: 118 MMHG | HEIGHT: 63 IN | DIASTOLIC BLOOD PRESSURE: 81 MMHG | WEIGHT: 140.75 LBS | BODY MASS INDEX: 24.94 KG/M2

## 2025-01-27 DIAGNOSIS — M17.0 PRIMARY OSTEOARTHRITIS OF BOTH KNEES: ICD-10-CM

## 2025-01-27 DIAGNOSIS — M67.813 TENDINOSIS OF RIGHT ROTATOR CUFF: ICD-10-CM

## 2025-01-27 DIAGNOSIS — M19.019 AC JOINT ARTHROPATHY: Primary | ICD-10-CM

## 2025-01-27 DIAGNOSIS — M67.814 TENDINOSIS OF LEFT ROTATOR CUFF: ICD-10-CM

## 2025-01-27 LAB
ADEQUACY: ABNORMAL
FINAL PATHOLOGIC DIAGNOSIS: ABNORMAL
Lab: ABNORMAL

## 2025-01-27 PROCEDURE — 3288F FALL RISK ASSESSMENT DOCD: CPT | Mod: CPTII,S$GLB,, | Performed by: PHYSICAL MEDICINE & REHABILITATION

## 2025-01-27 PROCEDURE — 3079F DIAST BP 80-89 MM HG: CPT | Mod: CPTII,S$GLB,, | Performed by: PHYSICAL MEDICINE & REHABILITATION

## 2025-01-27 PROCEDURE — 3074F SYST BP LT 130 MM HG: CPT | Mod: CPTII,S$GLB,, | Performed by: PHYSICAL MEDICINE & REHABILITATION

## 2025-01-27 PROCEDURE — 3072F LOW RISK FOR RETINOPATHY: CPT | Mod: CPTII,S$GLB,, | Performed by: PHYSICAL MEDICINE & REHABILITATION

## 2025-01-27 PROCEDURE — 1125F AMNT PAIN NOTED PAIN PRSNT: CPT | Mod: CPTII,S$GLB,, | Performed by: PHYSICAL MEDICINE & REHABILITATION

## 2025-01-27 PROCEDURE — G2211 COMPLEX E/M VISIT ADD ON: HCPCS | Mod: S$GLB,,, | Performed by: PHYSICAL MEDICINE & REHABILITATION

## 2025-01-27 PROCEDURE — 99999 PR PBB SHADOW E&M-EST. PATIENT-LVL V: CPT | Mod: PBBFAC,,, | Performed by: PHYSICAL MEDICINE & REHABILITATION

## 2025-01-27 PROCEDURE — 1101F PT FALLS ASSESS-DOCD LE1/YR: CPT | Mod: CPTII,S$GLB,, | Performed by: PHYSICAL MEDICINE & REHABILITATION

## 2025-01-27 PROCEDURE — 99204 OFFICE O/P NEW MOD 45 MIN: CPT | Mod: S$GLB,,, | Performed by: PHYSICAL MEDICINE & REHABILITATION

## 2025-01-27 PROCEDURE — 1159F MED LIST DOCD IN RCRD: CPT | Mod: CPTII,S$GLB,, | Performed by: PHYSICAL MEDICINE & REHABILITATION

## 2025-01-27 RX ORDER — TRAMADOL HYDROCHLORIDE 50 MG/1
50 TABLET ORAL EVERY 12 HOURS PRN
Qty: 60 TABLET | Refills: 2 | Status: SHIPPED | OUTPATIENT
Start: 2025-01-28 | End: 2025-04-28

## 2025-01-27 NOTE — PROGRESS NOTES
New Patient Chronic Pain Note (Initial Visit)    Referring Physician: Naman Barton MD    PCP: Emil Zhang MD    Chief Complaint:   Chief Complaint   Patient presents with    Shoulder Pain    Neck Pain     Stiffness on left side        SUBJECTIVE:    Cassandra Campos is a 74 y.o. female who presents to the clinic for the evaluation of bilateral knee and shoulder pain.  She was referred by Orthopedics for further evaluation and management of this pain.  She has past medical history of carpal tunnel syndrome, panic attacks, anxiety, depression, asthma, chronic bronchitis, hyperlipidemia, hypertension, atrial flutter, lung cancer history, DM2, obesity, GERD, polyarthritis, osteopenia, multiple other medical comorbidities as listed in her chart.  The pain started several years ago and symptoms have been off and on.The pain is located in the bilateral shoulders and bilateral knees.  She reports that the knee pain is relatively controlled at this time and mostly has pain isolated to the left shoulder currently.  The pain is described as  sharp, stabbing, aching  and is rated as  8/10 . The pain is rated with a score of  5/10 on the BEST day and a score of 9/10 on the WORST day.  Symptoms interfere with daily activity. The pain is exacerbated by use of the left arm and shoulder.  The pain is mitigated by medications and rest.  She has found tremendous benefit with tramadol 50 mg b.i.d. p.r.n. which was recently prescribed by her primary care provider.    Patient denies night fever/night sweats, urinary incontinence, bowel incontinence, significant weight loss, significant motor weakness, and loss of sensations.    Pain Disability Index Review:         1/27/2025    10:34 AM   Last 3 PDI Scores   Pain Disability Index (PDI) 56       Non-Pharmacologic Treatments:  Physical Therapy/Home Exercise: yes  Ice/Heat:yes  TENS: no  Acupuncture: no  Massage: yes  Chiropractic: no    Other: no      Pain Medications:  -  Opioids:  Tramadol , Norco  - Adjuvant Medications:  Lorazepam, eszopiclone, Elavil, diclofenac, diclofenac gel, lidocaine patch  - Anti-Coagulants: Aspirin     report:  Reviewed and consistent with medication use as prescribed.    Pain Procedures:   -previous subacromial bursal injections   -previous AC joint injections   -previous knee joint injections      Imaging:   CT abdomen pelvis 12/11/2024:  LUNG BASES: Since the comparison PET-CT scan, the lingular mass has been resected.  Dependent atelectasis in the lung bases again seen.  Interval development of a 1.2 cm posterior left lower lobe pleural base nodule as measured on axial image 35.  Negative for pleural or pericardial effusions. The distal esophagus is normal.  Interval increase in size of a left hilar mass measuring 2.1 cm on axial image 10.  Extensive coronary artery calcifications again seen.  Small hiatal hernia again seen.     ABDOMEN: Cholecystectomy clips.  Mildly progressive prominence of the biliary tree with the common bile duct measuring 1.4 cm in diameter on axial image 66.  Subtle low-attenuation focus in the right hepatic lobe near the gallbladder fossa measuring 1.8 cm on axial image 82. The spleen appears normal.  The pancreas is unremarkable.  Kidneys and adrenal glands are normal.     Stable mesenteric and retroperitoneal prominent lymph nodes, the largest of which is adjacent to the pancreatic head measuring 2.0 x 1.1 cm on axial image 69.  There also some enlarged portacaval space lymph nodes.     Negative for scratch ascites or inflammatory change noted within the abdomen or pelvis.  Vascular calcifications are present without aneurysmal changes. Portal vein is patent.     Gastric sleeve postoperative changes noted.  Normal appendix.  Appropriate stool.  Fluid-filled loops of small bowel noted.  There are 1 or 2 borderline dilated small bowel loops within the left upper quadrant, similar to the comparison study.  Negative for  dilated loops of colon.  Negative for free air.     PELVIS: The urinary bladder is unremarkable.    The female pelvic organs are normal. There are pelvic phleboliths.     Mild laxity of the linea alba with a small fat filled umbilical hernia again seen.  The abdominal wall is otherwise intact.     No significant osseous abnormality is identified.  Negative for significant spinal canal stenosis. Multilevel marginal spondylosis especially involving the thoracic region noted.  Degenerative facet arthropathy.  Age-appropriate degenerative changes of the pelvis and hips.  Vacuum phenomenon of the sacroiliac joints.     Negative for groin adenopathy.    X-ray right knee 07/18/2024:  The joint spaces of all 3 compartments of the right knee appear to be relatively well maintained. No joint effusion. No acute fracture or dislocation.     X-ray left knee 04/25/2024:  Mild medial joint space narrowing.  No fracture or dislocation.  No joint effusion.  Minimal spurring of patella at the patellofemoral joint space.         Past Medical History:   Diagnosis Date    Arthritis     hands    Bilateral bunions     Borderline glaucoma     De Quervain's disease (radial styloid tenosynovitis)     Gastritis     upper GI 2/2017    Hydradenitis     Hyperlipidemia     Hypertension     Insomnia     Migraines 02/01/2000    Nasal septum perforation     Obesity     Pneumonia     Restrictive airway disease     Sleep apnea     SVT (supraventricular tachycardia) 09/2013    Trigger finger     Type 2 diabetes mellitus 2012     am 02/01/2024     Past Surgical History:   Procedure Laterality Date    AXILLARY HIDRADENITIS EXCISION Bilateral     BONE EXOSTOSIS EXCISION Right 07/25/2018    Procedure: EXCISION, EXOSTOSIS;  Surgeon: Jayro Pedraza Sr., MD;  Location: [facility];  Service: Orthopedics;  Laterality: Right;    BREAST BIOPSY Bilateral     both benign    BREAST SURGERY  07/1998    BRONCHOSCOPY Bilateral 1/15/2025     Procedure: Bronchoscopy - insert lighted tube into airway to take a biopsy of lung;  Surgeon: Adrian Robin MD;  Location: [facility];  Service: Pulmonary;  Laterality: Bilateral;    CARPAL TUNNEL RELEASE      bilateral    CARPAL TUNNEL RELEASE Right 2024    Procedure: RELEASE, CARPAL TUNNEL;  Surgeon: Jaime Oswald MD;  Location: [facility];  Service: Orthopedics;  Laterality: Right;    CARPAL TUNNEL RELEASE Left 2024    Procedure: RELEASE, CARPAL TUNNEL;  Surgeon: Jaime Oswald MD;  Location: [facility];  Service: Orthopedics;  Laterality: Left;    CATARACT EXTRACTION Bilateral     OU     SECTION  1979    CHOLECYSTECTOMY  2014    COLONOSCOPY N/A 10/02/2020    Procedure: COLONOSCOPY;  Surgeon: Tushar Edwards MD;  Location: [facility];  Service: Endoscopy;  Laterality: N/A;    COLONOSCOPY W/ POLYPECTOMY  10/02/2020    Polyps x3, repeat 5 years; Tushar Edwards MD     CYST REMOVAL  2015    sebaceous cyst removed from face    DE QUERVAIN'S RELEASE Left 2020    Procedure: RELEASE, HAND, FOR DEQUERVAIN'S TENOSYNOVITIS;  Surgeon: Jaime Oswald MD;  Location: [facility];  Service: Orthopedics;  Laterality: Left;    DE QUERVAIN'S RELEASE Right 2020    Procedure: RELEASE, HAND, FOR DEQUERVAIN'S TENOSYNOVITIS;  Surgeon: Jaime Oswald MD;  Location: [facility];  Service: Orthopedics;  Laterality: Right;    ENDOBRONCHIAL ULTRASOUND Bilateral 04/10/2024    Procedure: ENDOBRONCHIAL ULTRASOUND (EBUS);  Surgeon: Adrian Robin MD;  Location: [facility];  Service: Pulmonary;  Laterality: Bilateral;    ENDOBRONCHIAL ULTRASOUND Bilateral 1/15/2025    Procedure: ENDOBRONCHIAL ULTRASOUND (EBUS);  Surgeon: Adrian Robin MD;  Location: [facility];  Service: Pulmonary;  Laterality: Bilateral;    EYE SURGERY      gastric sleeve  2017    Dr. Watson    INJECTION OF ANESTHETIC AGENT AROUND MULTIPLE INTERCOSTAL NERVES  5/10/2024    Procedure:  BLOCK, NERVE, INTERCOSTAL, 2 OR MORE;  Surgeon: Mike Nuñez MD;  Location: [facility] OR;  Service: Cardiothoracic;;    KNEE SURGERY Right     OLECRANON BURSECTOMY Right 2018    Procedure: BURSECTOMY, OLECRANON;  Surgeon: Jayro Pedraza Sr., MD;  Location: [facility] OR;  Service: Orthopedics;  Laterality: Right;    SURGICAL REMOVAL OF BUNION WITH OSTEOTOMY OF METATARSAL BONE Left 05/10/2019    Procedure: BUNIONECTOMY, WITH METATARSAL OSTEOTOMY;  Surgeon: Srinivasan Villanueva DPM;  Location: [facility] OR;  Service: Podiatry;  Laterality: Left;    SURGICAL REMOVAL OF BUNION WITH OSTEOTOMY OF METATARSAL BONE Right 2019    Procedure: BUNIONECTOMY, WITH METATARSAL OSTEOTOMY;  Surgeon: Srinivasan Villanueva DPM;  Location: [facility] OR;  Service: Podiatry;  Laterality: Right;    SURGICAL REMOVAL OF LYMPH NODE Left 5/10/2024    Procedure: EXCISION, LYMPH NODE;  Surgeon: Mike Nuñez MD;  Location: [facility] OR;  Service: Cardiothoracic;  Laterality: Left;    TONSILLECTOMY, ADENOIDECTOMY  1980s    TRANSESOPHAGEAL ECHOCARDIOGRAM WITH POSSIBLE CARDIOVERSION (LAKESHIA W/ POSS CARDIOVERSION) N/A 5/15/2024    Procedure: Transesophageal echo (LAKESHIA) intra-procedure log documentation;  Surgeon: Twan Pelaez MD;  Location: [facility] CATH LAB;  Service: Cardiology;  Laterality: N/A;    TRIGGER FINGER RELEASE Right 2015    Dr. Milo HALL ROBOTIC RATS,WITH LOBECTOMY,LUNG Left 5/10/2024    Procedure: XI ROBOTIC RATS,WITH LOBECTOMY,LUNG;  Surgeon: Mike Nuñez MD;  Location: [facility] OR;  Service: Cardiothoracic;  Laterality: Left;  Lingulectomy     Social History     Socioeconomic History    Marital status:     Number of children: 1   Occupational History    Occupation:  aid   Tobacco Use    Smoking status: Former     Current packs/day: 0.00     Average packs/day: 0.5 packs/day for 42.0 years (21.0 ttl pk-yrs)     Types: Cigarettes     Start date: 1970     Quit date: 2012     Years since quittin.0      Passive exposure: Past    Smokeless tobacco: Never   Substance and Sexual Activity    Alcohol use: Not Currently     Alcohol/week: 1.0 standard drink of alcohol     Types: 1 Glasses of wine per week     Comment: Glass red wine once a every 2 weeks    Drug use: Never    Sexual activity: Not Currently     Partners: Female     Birth control/protection: Abstinence, None   Social History Narrative    Single, part-time teacher. Masters degree biology.      Social Drivers of Health     Financial Resource Strain: Patient Declined (12/19/2024)    Overall Financial Resource Strain (CARDIA)     Difficulty of Paying Living Expenses: Patient declined   Food Insecurity: Patient Declined (12/19/2024)    Hunger Vital Sign     Worried About Running Out of Food in the Last Year: Patient declined     Ran Out of Food in the Last Year: Patient declined   Transportation Needs: No Transportation Needs (12/19/2023)    PRAPARE - Transportation     Lack of Transportation (Medical): No     Lack of Transportation (Non-Medical): No   Physical Activity: Insufficiently Active (12/19/2024)    Exercise Vital Sign     Days of Exercise per Week: 3 days     Minutes of Exercise per Session: 20 min   Stress: Stress Concern Present (12/19/2024)    [employer] - Occupational Stress Questionnaire     Feeling of Stress : Very much   Housing Stability: High Risk (12/19/2024)    Housing Stability Vital Sign     Unable to Pay for Housing in the Last Year: Yes     Family History   Problem Relation Name Age of Onset    Prostate cancer Brother      Diabetes Maternal Aunt Alondra Aguilarne     Diabetes Cousin      Hypertension Maternal Grandmother Portia Kwesi        Review of patient's allergies indicates:  No Known Allergies    Current Outpatient Medications   Medication Sig    albuterol (PROVENTIL/VENTOLIN HFA) 90 mcg/actuation inhaler INHALE 2 PUFFS INTO THE LUNGS EVERY 4 HOURS AS NEEDED FOR WHEEZING OR SHORTNESS OF  BREATH.    amitriptyline (ELAVIL) 50 MG tablet Take 1 tablet (50 mg total) by mouth every evening.    aspirin (ECOTRIN) 81 MG EC tablet Take 1 tablet (81 mg total) by mouth once daily.    blood sugar diagnostic Strp use to test blood sugar 1-2 times a day    blood-glucose meter (ACCU-CHEK GUIDE ME GLUCOSE MTR) Misc use to check blood glucose 2 times a day    blood-glucose meter (ACCU-CHEK GUIDE ME GLUCOSE MTR) Misc To check blood glucose 1-2 times daily, to use with insurance preferred meter    eszopiclone (LUNESTA) 3 mg Tab Take 1 tablet (3 mg total) by mouth every evening.    lancets Misc Use to test blood glucose 1-2 times a day, discard lancet after each use    LORazepam (ATIVAN) 1 MG tablet Take 1 tablet (1 mg total) by mouth 2 (two) times daily.    losartan (COZAAR) 25 MG tablet Take 1 tablet (25 mg total) by mouth once daily.    metoprolol succinate (TOPROL-XL) 50 MG 24 hr tablet Take 1 tablet (50 mg total) by mouth once daily.    omeprazole (PRILOSEC) 20 MG capsule Take 1 capsule (20 mg total) by mouth once daily.    semaglutide (OZEMPIC) 2 mg/dose (8 mg/3 mL) PnIj Inject 2 mg into the skin every 7 days.    sulfamethoxazole-trimethoprim 800-160mg (BACTRIM DS) 800-160 mg Tab Take 1 tablet by mouth 2 (two) times daily. for 10 days    triamcinolone acetonide 0.025 % Lotn Apply small amount to affective areas twice a day  take cool showers or baths    traMADoL (ULTRAM) 50 mg tablet Take 1 tablet (50 mg total) by mouth 2 (two) times a day. (Patient not taking: Reported on 1/27/2025)    [START ON 1/28/2025] traMADoL (ULTRAM) 50 mg tablet Take 1 tablet (50 mg total) by mouth every 12 (twelve) hours as needed for Pain. Greater than 7 day supply medically necessary.     Current Facility-Administered Medications   Medication    sodium chloride 0.9% flush 10 mL    sodium chloride 0.9% flush 10 mL       Review of Systems     GENERAL:  No weight loss, malaise or fevers.  HEENT:   No recent changes in  "vision or hearing  NECK:  Negative for lumps, no difficulty with swallowing.  RESPIRATORY:  Negative for cough, wheezing or shortness of breath, patient denies any recent URI.  CARDIOVASCULAR:  Negative for chest pain, leg swelling or palpitations.  GI:  Negative for abdominal discomfort, blood in stools or black stools or change in bowel habits.  MUSCULOSKELETAL:  See HPI.  SKIN:  Negative for lesions, rash, and itching.  PSYCH:  No mood disorder or recent psychosocial stressors.  Patients sleep is not disturbed secondary to pain.  HEMATOLOGY/LYMPHOLOGY:  Negative for prolonged bleeding, bruising easily or swollen nodes.  Patient is currently taking anti-coagulants  NEURO:   No history of headaches, syncope, paralysis, seizures or tremors.  All other reviewed and negative other than HPI.    OBJECTIVE:    /81   Pulse 75   Ht 5' 3" (1.6 m)   Wt 63.9 kg (140 lb 12.2 oz)   BMI 24.94 kg/m²         Physical Exam    GENERAL: Well appearing, in no acute distress, alert and oriented x3.  PSYCH:  Mood and affect appropriate.  SKIN: Skin color, texture, turgor normal, no rashes or lesions.  HEAD/FACE:  Normocephalic, atraumatic. Cranial nerves grossly intact.  NECK:  Mild-to-moderate pain to palpation over the cervical paraspinous muscles. Spurling Negative.  Mild-to-moderate pain with neck flexion, extension, and lateral flexion.   CV: RRR with palpation of the radial artery.  PULM: No evidence of respiratory difficulty, symmetric chest rise.  GI:  Soft and non-tender.  BACK:  Normal range of motion without pain reproduction.  EXTREMITIES: Peripheral joint ROM is full and pain free without obvious instability or laxity in all four extremities. No deformities, edema, or skin discoloration. Good capillary refill.  MUSCULOSKELETAL:  Tenderness over the medial joint lines of each knee.  Positive impingement signs of the left shoulder with Whalen, Neer's, and empty can.  Point tenderness over the left AC joint.  " Bilateral upper and lower extremity strength is normal and symmetric.  No atrophy or tone abnormalities are noted.  NEURO: Bilateral upper and lower extremity coordination and muscle stretch reflexes are physiologic and symmetric.  Plantar response are downgoing. No clonus.  No loss of sensation is noted.  GAIT:  Slow, antalgic.      LABS:  Lab Results   Component Value Date    WBC 5.21 12/11/2024    HGB 12.6 12/11/2024    HCT 38.5 12/11/2024    MCV 71 (L) 12/11/2024     12/11/2024       CMP  Sodium   Date Value Ref Range Status   12/11/2024 138 136 - 145 mmol/L Final     Potassium   Date Value Ref Range Status   12/11/2024 3.7 3.5 - 5.1 mmol/L Final     Chloride   Date Value Ref Range Status   12/11/2024 106 95 - 110 mmol/L Final     CO2   Date Value Ref Range Status   12/11/2024 22 (L) 23 - 29 mmol/L Final     Glucose   Date Value Ref Range Status   12/11/2024 85 70 - 110 mg/dL Final     BUN   Date Value Ref Range Status   12/11/2024 5 (L) 8 - 23 mg/dL Final     Creatinine   Date Value Ref Range Status   12/11/2024 0.6 0.5 - 1.4 mg/dL Final     Calcium   Date Value Ref Range Status   12/11/2024 9.4 8.7 - 10.5 mg/dL Final     Total Protein   Date Value Ref Range Status   12/11/2024 7.5 6.0 - 8.4 g/dL Final     Albumin   Date Value Ref Range Status   12/11/2024 2.9 (L) 3.5 - 5.2 g/dL Final     Total Bilirubin   Date Value Ref Range Status   12/11/2024 0.3 0.1 - 1.0 mg/dL Final     Comment:     For infants and newborns, interpretation of results should be based  on gestational age, weight and in agreement with clinical  observations.    Premature Infant recommended reference ranges:  Up to 24 hours.............<8.0 mg/dL  Up to 48 hours............<12.0 mg/dL  3-5 days..................<15.0 mg/dL  6-29 days.................<15.0 mg/dL       Alkaline Phosphatase   Date Value Ref Range Status   12/11/2024 96 40 - 150 U/L Final     AST   Date Value Ref Range Status   12/11/2024 14 10 - 40 U/L Final     ALT    Date Value Ref Range Status   12/11/2024 19 10 - 44 U/L Final     Anion Gap   Date Value Ref Range Status   12/11/2024 10 8 - 16 mmol/L Final     eGFR if    Date Value Ref Range Status   06/21/2022 >60 >60 mL/min/1.73 m^2 Final     eGFR if non    Date Value Ref Range Status   06/21/2022 >60 >60 mL/min/1.73 m^2 Final     Comment:     Calculation used to obtain the estimated glomerular filtration  rate (eGFR) is the CKD-EPI equation.          Lab Results   Component Value Date    HGBA1C 5.7 (H) 08/28/2024             ASSESSMENT: 74 y.o. year old female with chronic shoulder and knee pain, consistent with     1. AC joint arthropathy        2. Primary osteoarthritis of both knees  Ambulatory referral/consult to Pain Clinic    Ambulatory referral/consult to Pain Clinic    traMADoL (ULTRAM) 50 mg tablet      3. Tendinosis of left rotator cuff  Ambulatory referral/consult to Pain Clinic    traMADoL (ULTRAM) 50 mg tablet      4. Tendinosis of right rotator cuff  Ambulatory referral/consult to Pain Clinic    traMADoL (ULTRAM) 50 mg tablet            PLAN:   - Interventions:  Consider repeat shoulder and knee joint injections in the future when warranted    - Anticoagulation use: Patient is taking ASA as 1° prevention    - Medications: I have stressed the importance of physical activity and a home exercise plan to help with pain and improve health. and Patient can continue with medications for now since they are providing benefits, using them appropriately, and without side effects.     An opioid pain contract was completed today after having an extensive conversation about chronic opioid use for pain management. We discussed the risks and benefits of opioids.  I discouraged the patient from escalation of opioid use and try to minimize its use to decrease chance of dependence, tolerance, and opioid-based hyperalgesia.  I reviewed the  in great detail and there are no inconsistencies and it  is appropriate with patient's history.  There are no signs of aberrant drug behavior.  The opioid risk tool was also completed, low risk.  Pt counseled about the side effects of long term use of opioids including dependence, tolerance, addiction, respiratory depression, somnelence , immune and endocrine dysfunction.  The patient expressed understanding.    Provide tramadol 50 mg b.i.d. p.r.n., 60 tablets with 2 refills            - Therapy:  Advised patient continue with activities as tolerated    - Psychological:  Discussed coping mechanisms to help address chronic pain issues    - Labs:  Reviewed    - Imaging: Reviewed available imaging with patient and answered any questions they had regarding study.    - Consults/Referrals:  None at this time    - Records:  Reviewed/Obtain old records from outside physicians and imaging    - Follow up visit: return to clinic in 4-6 weeks or as needed    - Counseled patient regarding the importance of activity modification and physical therapy    - This condition does not require this patient to take time off of work, and the primary goal of our Pain Management services is to improve the patient's functional capacity.    - Patient Questions: Answered all of the patient's questions regarding diagnosis, therapy, and treatment        The above plan and management options were discussed at length with patient. Patient is in agreement with the above and verbalized understanding.    I discussed the goals of interventional chronic pain management with the patient on today's visit.  I explained the utility of injections for diagnostic and therapeutic purposes.  We discussed a multimodal approach to pain including treating the patient's given worst pain at any given time.  We will use a systematic approach to addressing pain.  We will also adopt a multimodal approach that includes injections, adjuvant medications, physical therapy, at times psychiatry.  There may be a limited role for  opioid use intermittently in the treatment of pain, more particularly for acute pain although no one approach can be used as a sole treatment modality.    I emphasized the importance of regular exercise, core strengthening and stretching, diet and weight loss as a cornerstone of long-term pain management.    Visit today included increased complexity associated with the care of the episodic problem of chronic pain which was addressed and continue to manage the longitudinal care of the patient due to the serious and/or complex managed problem(s) listed above.        Michael Willis MD  Interventional Pain Management  [facility]    Disclaimer:  This note was prepared using voice recognition system and is likely to have sound alike errors that may have been overlooked even after proof reading.  Please call me with any questions

## 2025-01-27 NOTE — TELEPHONE ENCOUNTER
Spoke with patient  Station 10 was +ve for NSCLC    1. Lung, right middle lobe, bronchioalveolar lavage (thin prep and cell block):  - Negative for malignancy    2. Lymph node, station 7, fine-needle aspiration (smear slides and cell block):  - Negative for malignancy  - Lymphocytes present  - Benign bronchial cells present    3. Lymph node, 10 L, fine needle aspiration (smear slides and cell block):    - Metastatic carcinoma, consistent with non-small cell carcinoma of pulmonary origin  - Scant lymphoid component identified  - Immunostains were performed with appropriately reactive controls and the cells of interest are positive for CK7 and TTF 1 and are negative for p40, supporting the above interpretation    COMMENT  Additional tissue is present for molecular studies upon request.     Comment: Interp By ROSAURA Petersen MD, Signed on 01/27/2025 at 10:51   Adequacy No adequacy Abnormal    Disclaimer  Abnormal   Screening was performed at [facility], [address], [place] [address].

## 2025-01-28 ENCOUNTER — TELEPHONE (OUTPATIENT)
Dept: PAIN MEDICINE | Facility: CLINIC | Age: 75
End: 2025-01-28
Payer: MEDICARE

## 2025-01-28 NOTE — TELEPHONE ENCOUNTER
----- Message from Lizeth sent at 1/28/2025  8:14 AM CST -----  Type:  Patient Returning Call    Who Called:Cassandra  Who Left Message for Patient:  Does the patient know what this is regarding?:Appointment  Would the patient rather a call back or a response via [employer]? Callback  Best Call Back Number:[phone]  Additional Information: Patient states provider told her to schedule with him and that he is only there on Monday and Wednesday, but she is scheduled with Lizzy and want a callback from office

## 2025-01-28 NOTE — TELEPHONE ENCOUNTER
I returned the patient call letting her know that she can follow up with an FRED/NP. Patient understands.    Maureen VALADEZ ([facility])

## 2025-01-31 ENCOUNTER — OFFICE VISIT (OUTPATIENT)
Dept: HEMATOLOGY/ONCOLOGY | Facility: CLINIC | Age: 75
End: 2025-01-31
Payer: MEDICARE

## 2025-01-31 ENCOUNTER — PATIENT MESSAGE (OUTPATIENT)
Dept: OPHTHALMOLOGY | Facility: CLINIC | Age: 75
End: 2025-01-31

## 2025-01-31 ENCOUNTER — LAB VISIT (OUTPATIENT)
Dept: LAB | Facility: HOSPITAL | Age: 75
End: 2025-01-31
Attending: INTERNAL MEDICINE
Payer: MEDICARE

## 2025-01-31 ENCOUNTER — INITIAL CONSULT (OUTPATIENT)
Dept: RADIATION ONCOLOGY | Facility: CLINIC | Age: 75
End: 2025-01-31
Payer: MEDICARE

## 2025-01-31 ENCOUNTER — OFFICE VISIT (OUTPATIENT)
Dept: OPHTHALMOLOGY | Facility: CLINIC | Age: 75
End: 2025-01-31
Payer: MEDICARE

## 2025-01-31 VITALS
HEIGHT: 63 IN | WEIGHT: 144.19 LBS | HEIGHT: 63 IN | BODY MASS INDEX: 25.47 KG/M2 | DIASTOLIC BLOOD PRESSURE: 79 MMHG | OXYGEN SATURATION: 97 % | TEMPERATURE: 98 F | WEIGHT: 143.75 LBS | SYSTOLIC BLOOD PRESSURE: 114 MMHG | BODY MASS INDEX: 25.55 KG/M2 | HEART RATE: 85 BPM | RESPIRATION RATE: 19 BRPM | OXYGEN SATURATION: 95 % | HEART RATE: 92 BPM | DIASTOLIC BLOOD PRESSURE: 75 MMHG | SYSTOLIC BLOOD PRESSURE: 128 MMHG | TEMPERATURE: 99 F

## 2025-01-31 DIAGNOSIS — C34.92 ADENOCARCINOMA OF LEFT LUNG: Primary | ICD-10-CM

## 2025-01-31 DIAGNOSIS — R94.6 ABNORMAL RESULTS OF THYROID FUNCTION STUDIES: ICD-10-CM

## 2025-01-31 DIAGNOSIS — H52.7 REFRACTIVE ERRORS: ICD-10-CM

## 2025-01-31 DIAGNOSIS — D84.822 IMMUNODEFICIENCY SECONDARY TO RADIATION THERAPY: ICD-10-CM

## 2025-01-31 DIAGNOSIS — C34.32 MALIGNANT NEOPLASM OF LOWER LOBE OF LEFT LUNG: ICD-10-CM

## 2025-01-31 DIAGNOSIS — Z96.1 PSEUDOPHAKIA OF BOTH EYES: ICD-10-CM

## 2025-01-31 DIAGNOSIS — C34.92 ADENOCARCINOMA OF LEFT LUNG: ICD-10-CM

## 2025-01-31 DIAGNOSIS — T45.1X5A IMMUNODEFICIENCY DUE TO CHEMOTHERAPY: ICD-10-CM

## 2025-01-31 DIAGNOSIS — E11.9 DIABETES MELLITUS TYPE 2 WITHOUT RETINOPATHY: Primary | ICD-10-CM

## 2025-01-31 DIAGNOSIS — R93.5 ABNORMAL CT OF THE ABDOMEN: ICD-10-CM

## 2025-01-31 DIAGNOSIS — Z79.899 IMMUNODEFICIENCY DUE TO CHEMOTHERAPY: ICD-10-CM

## 2025-01-31 DIAGNOSIS — D84.821 IMMUNODEFICIENCY DUE TO CHEMOTHERAPY: ICD-10-CM
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
## 2025-01-31 PROBLEM — Z79.69 IMMUNODEFICIENCY DUE TO CHEMOTHERAPY: Status: ACTIVE | Noted: 2025-01-31

## 2025-01-31 LAB
ALBUMIN SERPL BCP-MCNC: 3.2 G/DL (ref 3.5–5.2)
ALP SERPL-CCNC: 96 U/L (ref 40–150)
ALT SERPL W/O P-5'-P-CCNC: 117 U/L (ref 10–44)
ANION GAP SERPL CALC-SCNC: 5 MMOL/L (ref 8–16)
AST SERPL-CCNC: 60 U/L (ref 10–40)
BASOPHILS # BLD AUTO: 0.04 K/UL (ref 0–0.2)
BASOPHILS NFR BLD: 0.7 % (ref 0–1.9)
BILIRUB SERPL-MCNC: 0.3 MG/DL (ref 0.1–1)
BUN SERPL-MCNC: 10 MG/DL (ref 8–23)
CALCIUM SERPL-MCNC: 9.9 MG/DL (ref 8.7–10.5)
CHLORIDE SERPL-SCNC: 106 MMOL/L (ref 95–110)
CO2 SERPL-SCNC: 30 MMOL/L (ref 23–29)
CREAT SERPL-MCNC: 0.7 MG/DL (ref 0.5–1.4)
DIFFERENTIAL METHOD BLD: ABNORMAL
EOSINOPHIL # BLD AUTO: 0.2 K/UL (ref 0–0.5)
EOSINOPHIL NFR BLD: 2.6 % (ref 0–8)
ERYTHROCYTE [DISTWIDTH] IN BLOOD BY AUTOMATED COUNT: 17.1 % (ref 11.5–14.5)
EST. GFR  (NO RACE VARIABLE): >60 ML/MIN/1.73 M^2
GLUCOSE SERPL-MCNC: 82 MG/DL (ref 70–110)
HCT VFR BLD AUTO: 39.3 % (ref 37–48.5)
HGB BLD-MCNC: 12.6 G/DL (ref 12–16)
IMM GRANULOCYTES # BLD AUTO: 0.01 K/UL (ref 0–0.04)
IMM GRANULOCYTES NFR BLD AUTO: 0.2 % (ref 0–0.5)
LYMPHOCYTES # BLD AUTO: 2.9 K/UL (ref 1–4.8)
LYMPHOCYTES NFR BLD: 51.3 % (ref 18–48)
MCH RBC QN AUTO: 23.9 PG (ref 27–31)
MCHC RBC AUTO-ENTMCNC: 32.1 G/DL (ref 32–36)
MCV RBC AUTO: 75 FL (ref 82–98)
MONOCYTES # BLD AUTO: 0.5 K/UL (ref 0.3–1)
MONOCYTES NFR BLD: 8.8 % (ref 4–15)
NEUTROPHILS # BLD AUTO: 2.1 K/UL (ref 1.8–7.7)
NEUTROPHILS NFR BLD: 36.4 % (ref 38–73)
NRBC BLD-RTO: 0 /100 WBC
PLATELET # BLD AUTO: 271 K/UL (ref 150–450)
PMV BLD AUTO: 8.9 FL (ref 9.2–12.9)
POTASSIUM SERPL-SCNC: 3.8 MMOL/L (ref 3.5–5.1)
PROT SERPL-MCNC: 8.1 G/DL (ref 6–8.4)
RBC # BLD AUTO: 5.27 M/UL (ref 4–5.4)
SODIUM SERPL-SCNC: 141 MMOL/L (ref 136–145)
T4 FREE SERPL-MCNC: 0.72 NG/DL (ref 0.71–1.51)
TSH SERPL DL<=0.005 MIU/L-ACNC: 4.82 UIU/ML (ref 0.4–4)
WBC # BLD AUTO: 5.69 K/UL (ref 3.9–12.7)

## 2025-01-31 PROCEDURE — 99215 OFFICE O/P EST HI 40 MIN: CPT | Mod: S$GLB,,, | Performed by: SPECIALIST

## 2025-01-31 PROCEDURE — 36415 COLL VENOUS BLD VENIPUNCTURE: CPT | Performed by: INTERNAL MEDICINE

## 2025-01-31 PROCEDURE — 1159F MED LIST DOCD IN RCRD: CPT | Mod: CPTII,S$GLB,, | Performed by: OPTOMETRIST

## 2025-01-31 PROCEDURE — 92014 COMPRE OPH EXAM EST PT 1/>: CPT | Mod: S$GLB,,, | Performed by: OPTOMETRIST

## 2025-01-31 PROCEDURE — 3074F SYST BP LT 130 MM HG: CPT | Mod: CPTII,S$GLB,, | Performed by: INTERNAL MEDICINE

## 2025-01-31 PROCEDURE — 1159F MED LIST DOCD IN RCRD: CPT | Mod: CPTII,S$GLB,, | Performed by: INTERNAL MEDICINE

## 2025-01-31 PROCEDURE — 99999 PR PBB SHADOW E&M-EST. PATIENT-LVL V: CPT | Mod: PBBFAC,,, | Performed by: SPECIALIST

## 2025-01-31 PROCEDURE — 1101F PT FALLS ASSESS-DOCD LE1/YR: CPT | Mod: CPTII,S$GLB,, | Performed by: INTERNAL MEDICINE

## 2025-01-31 PROCEDURE — 84443 ASSAY THYROID STIM HORMONE: CPT | Performed by: INTERNAL MEDICINE

## 2025-01-31 PROCEDURE — 3078F DIAST BP <80 MM HG: CPT | Mod: CPTII,S$GLB,, | Performed by: INTERNAL MEDICINE

## 2025-01-31 PROCEDURE — 99215 OFFICE O/P EST HI 40 MIN: CPT | Mod: S$GLB,,, | Performed by: INTERNAL MEDICINE

## 2025-01-31 PROCEDURE — 3072F LOW RISK FOR RETINOPATHY: CPT | Mod: CPTII,S$GLB,, | Performed by: INTERNAL MEDICINE

## 2025-01-31 PROCEDURE — 2023F DILAT RTA XM W/O RTNOPTHY: CPT | Mod: CPTII,S$GLB,, | Performed by: OPTOMETRIST

## 2025-01-31 PROCEDURE — 1125F AMNT PAIN NOTED PAIN PRSNT: CPT | Mod: CPTII,S$GLB,, | Performed by: INTERNAL MEDICINE

## 2025-01-31 PROCEDURE — 3288F FALL RISK ASSESSMENT DOCD: CPT | Mod: CPTII,S$GLB,, | Performed by: INTERNAL MEDICINE

## 2025-01-31 PROCEDURE — 92015 DETERMINE REFRACTIVE STATE: CPT | Mod: S$GLB,,, | Performed by: OPTOMETRIST

## 2025-01-31 PROCEDURE — 85025 COMPLETE CBC W/AUTO DIFF WBC: CPT | Performed by: INTERNAL MEDICINE

## 2025-01-31 PROCEDURE — 99999 PR PBB SHADOW E&M-EST. PATIENT-LVL III: CPT | Mod: PBBFAC,,, | Performed by: OPTOMETRIST

## 2025-01-31 PROCEDURE — 80053 COMPREHEN METABOLIC PANEL: CPT | Performed by: INTERNAL MEDICINE

## 2025-01-31 PROCEDURE — 84439 ASSAY OF FREE THYROXINE: CPT | Performed by: INTERNAL MEDICINE

## 2025-01-31 PROCEDURE — 99999 PR PBB SHADOW E&M-EST. PATIENT-LVL V: CPT | Mod: PBBFAC,,, | Performed by: INTERNAL MEDICINE

## 2025-01-31 NOTE — PLAN OF CARE
START ON PATHWAY REGIMEN - Non-Small Cell Lung    [id number]        Paclitaxel       Carboplatin           Additional Orders: Chemotherapy is given concurrently with radiation.    **Always confirm dose/schedule in your pharmacy ordering system**    Patient Characteristics:  Preoperative or Nonsurgical Candidate (Clinical Staging), Stage IIB (N2a only)   or Stage III - Nonsurgical Candidate, PS = 0,1  Therapeutic Status: Preoperative or Nonsurgical Candidate (Clinical Staging)  AJCC T Category: cT3  AJCC N Category: cN1  AJCC M Category: cM0  AJCC 9 Stage Grouping: IIIA  Check here if patient was staged using an edition other than AJCC Staging 9th   Edition: false  ECOG Performance Status: 1  Intent of Therapy:  Curative Intent, Not Discussed with Patient

## 2025-01-31 NOTE — PROGRESS NOTES
I have been asked to see this delightful 74-year-old woman who has failed in lymph nodes after undergoing initial resection of a lingular adenocarcinoma.  History of present illness:  Mrs. Campos was found to have a solitary pulmonary nodule on routine chest x-ray and on 02/16/2024 underwent CT scanning of the chest without contrast which demonstrated a 2.3 cm lobular mass involving the lingula abutting the pleural surface.  There was a separate 13 mm subpleural nodule in the left lower lobe.  There were no other nodules noted or enlarged lymph nodes.  She went to PET scanning on 03/12/2024 showing the lingular nodule to have an SUV of 8.4.  The other nodule had an SUV of 1.4.  She did have a 1.8 cm left hilar lymph node with an SUV of 4.7 and a 1.9 cm left axillary lymph node with an SUV of 3.6.  (she has had a previous history of bilateral hidradenitis suppurativa axillary surgery).  CT biopsy of the lingular mass 04/04/2024 demonstrated adenocarcinoma.  She was taken to endobronchial ultrasound 04/10/2024 without findings of metastatic disease in lymph nodes.  On 05/10/2024 she underwent lingulectomy with resection of hilar lymph nodes as well as lymph nodes at stations 965 and 4 L.  None of the lymph nodes had metastatic disease.  The primary was a 2.8 cm grade 3 lesion with clear margins.  She did well until follow-up PET scanning 12/23/2024 demonstrated a left hilar lymph node measuring 1.9 cm with an SUV of 9.4.  Endobronchial ultrasound 01/15/2025 demonstrated recurrent adenocarcinoma in what was called a 10 L lymph node.  She was further staged with an MRI of the brain 1/ 8 /2025 showing no evidence of metastatic disease.  She is entirely asymptomatic from her lung disease.  She has a history of smoking a pack a week from age [age].  She has hurt herself wheeze over the last 4-5 years but is not symptom limited by her breathing.  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  Her paternal aunt and 2 paternal uncles had some sort of GI malignancy and she is not sure whether it was pancreatic or colon.  Social: She lives here in [place].  She was a  for the [employer] retiring in .  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  Review of systems:  Extensively question noncontributory  Her ECOG performance status is 0  Physical exam: She is a well-developed well-nourished woman in no apparent distress she is alert and oriented answering questions appropriately  She has no palpable supraclavicular adenopathy  On cardiovascular exam she has a regular rhythm and rate without murmur rub or gallop  Her lungs are clear to auscultation  Impression: Mrs. 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  At this point I believe that the options are to proceed with stereotactic radiosurgery to the hilar lymph node versus more protracted fractionated radiation to her regional nodes with concurrent chemotherapy.  I favor the prolonged course of treatment as she is certainly at risk to have other jann disease that is subclinical.  I have discussed this with her and she is willing to proceed.  Plan: She will be scheduled for simulation with plans to treat her  utilizing weekly carboplatin Taxol chemosensitization by Dr. Moran.  I certainly appreciate participating in this delightful woman's care.    A total of 55 minutes was spent reviewing records, images, discussing her case with colleagues and face-to-face with the patient.

## 2025-01-31 NOTE — PROGRESS NOTES
SUBJECTIVE  Cassandra Maria Campos is 74 y.o. female  Corrected distance visual acuity was 20/25 in the right eye and 20/30 in the left eye. Corrected near visual acuity was J1 in the right eye and J1 in the left eye.   Chief Complaint   Patient presents with    Diabetic Eye Exam     Lab Results       Component                Value               Date                       HGBA1C                   5.7 (H)             08/28/2024                   HPI     Diabetic Eye Exam     Additional comments: Lab Results       Component                Value               Date                       HGBA1C                   5.7 (H)             08/28/2024                    Comments    Patient states no visual complaints.  No ocular pain/discomfort.  Not using any otc drops.  Wear PAL glasses.          Last edited by Karina Gonzalez on 1/31/2025  3:37 PM.         Assessment /Plan :  1. Diabetes mellitus type 2 without retinopathy  No Background Diabetic Retinopathy  Strict BG control, f/u w/ PCP, and annual DFE  Stressed importance of DM control to preserve vision     2. Pseudophakia of both eyes  Well-centered, stable IOL OU. Monitor annually.     3. Refractive errors  Dispense Final Rx for glasses.  RTC 1 year  Discussed above and answered questions.

## 2025-01-31 NOTE — PROGRESS NOTES
Subjective:       Patient ID: Cassandra Campos is a 74 y.o. female.    Chief Complaint: Results, Chemotherapy, and Lung Cancer    HPI:  74-year-old female history of low for recurrent biopsy-proven non-small cell lung carcinoma patient returns for clinical follow-up patient has been seen by radiation oncology in his here for review ECOG status 1    Past Medical History:   Diagnosis Date    Arthritis     hands    Bilateral bunions     Borderline glaucoma     De Quervain's disease (radial styloid tenosynovitis)     Gastritis     upper GI 2017    Hydradenitis     Hyperlipidemia     Hypertension     Insomnia     Migraines 2000    Nasal septum perforation     Obesity     Pneumonia     Restrictive airway disease     Sleep apnea     SVT (supraventricular tachycardia) 2013    Trigger finger     Type 2 diabetes mellitus      am 2024     Family History   Problem Relation Name Age of Onset    Prostate cancer Brother      Diabetes Maternal Aunt Alondra Campos     Diabetes Cousin      Hypertension Maternal Grandmother Portia Kwesi      Social History     Socioeconomic History    Marital status:     Number of children: 1   Occupational History    Occupation:  aid   Tobacco Use    Smoking status: Former     Current packs/day: 0.00     Average packs/day: 0.5 packs/day for 42.0 years (21.0 ttl pk-yrs)     Types: Cigarettes     Start date: 1970     Quit date: 2012     Years since quittin.0     Passive exposure: Past    Smokeless tobacco: Never   Substance and Sexual Activity    Alcohol use: Not Currently     Alcohol/week: 1.0 standard drink of alcohol     Types: 1 Glasses of wine per week     Comment: Glass red wine once a every 2 weeks    Drug use: Never    Sexual activity: Not Currently     Partners: Female     Birth control/protection: Abstinence, None   Social History Narrative    Single, part-time teacher. Masters degree biology.      Social Drivers of Health      Financial Resource Strain: Patient Declined (12/19/2024)    Overall Financial Resource Strain (CARDIA)     Difficulty of Paying Living Expenses: Patient declined   Food Insecurity: Patient Declined (12/19/2024)    Hunger Vital Sign     Worried About Running Out of Food in the Last Year: Patient declined     Ran Out of Food in the Last Year: Patient declined   Transportation Needs: No Transportation Needs (12/19/2023)    PRAPARE - Transportation     Lack of Transportation (Medical): No     Lack of Transportation (Non-Medical): No   Physical Activity: Insufficiently Active (12/19/2024)    Exercise Vital Sign     Days of Exercise per Week: 3 days     Minutes of Exercise per Session: 20 min   Stress: Stress Concern Present (12/19/2024)    [employer] [place] [employer] - Occupational Stress Questionnaire     Feeling of Stress : Very much   Housing Stability: High Risk (12/19/2024)    Housing Stability Vital Sign     Unable to Pay for Housing in the Last Year: Yes     Past Surgical History:   Procedure Laterality Date    AXILLARY HIDRADENITIS EXCISION Bilateral     BONE EXOSTOSIS EXCISION Right 07/25/2018    Procedure: EXCISION, EXOSTOSIS;  Surgeon: Jayro Pedraza Sr., MD;  Location[facility];  Service: Orthopedics;  Laterality: Right;    BREAST BIOPSY Bilateral     both benign    BREAST SURGERY  07/1998    BRONCHOSCOPY Bilateral 1/15/2025    Procedure: Bronchoscopy - insert lighted tube into airway to take a biopsy of lung;  Surgeon: Adrian Robin MD;  Location: [facility];  Service: Pulmonary;  Laterality: Bilateral;    CARPAL TUNNEL RELEASE      bilateral    CARPAL TUNNEL RELEASE Right 02/09/2024    Procedure: RELEASE, CARPAL TUNNEL;  Surgeon: Jaime Oswald MD;  Location: [facility] OR;  Service: Orthopedics;  Laterality: Right;    CARPAL TUNNEL RELEASE Left 03/08/2024    Procedure: RELEASE, CARPAL TUNNEL;  Surgeon: Jaime Oswald MD;  Location: [facility] OR;  Service: Orthopedics;   Laterality: Left;    CATARACT EXTRACTION Bilateral     OU     SECTION  1979    CHOLECYSTECTOMY  2014    COLONOSCOPY N/A 10/02/2020    Procedure: COLONOSCOPY;  Surgeon: Tushar Edwards MD;  Location: [facility];  Service: Endoscopy;  Laterality: N/A;    COLONOSCOPY W/ POLYPECTOMY  10/02/2020    Polyps x3, repeat 5 years; Tushar Edwards MD     CYST REMOVAL  2015    sebaceous cyst removed from face    DE QUERVAIN'S RELEASE Left 2020    Procedure: RELEASE, HAND, FOR DEQUERVAIN'S TENOSYNOVITIS;  Surgeon: Jaime Oswald MD;  [facility]  Service: Orthopedics;  Laterality: Left;    DE QUERVAIN'S RELEASE Right 2020    Procedure: RELEASE, HAND, FOR DEQUERVAIN'S TENOSYNOVITIS;  Surgeon: Jaime Oswald MD;  Location: [facility];  Service: Orthopedics;  Laterality: Right;    ENDOBRONCHIAL ULTRASOUND Bilateral 04/10/2024    Procedure: ENDOBRONCHIAL ULTRASOUND (EBUS);  Surgeon: Adrian Robin MD;  Location: [facility];  Service: Pulmonary;  Laterality: Bilateral;    ENDOBRONCHIAL ULTRASOUND Bilateral 1/15/2025    Procedure: ENDOBRONCHIAL ULTRASOUND (EBUS);  Surgeon: Adrian Robin MD;  Location: [facility];  Service: Pulmonary;  Laterality: Bilateral;    EYE SURGERY      gastric sleeve  2017    Dr. Watson    INJECTION OF ANESTHETIC AGENT AROUND MULTIPLE INTERCOSTAL NERVES  5/10/2024    Procedure: BLOCK, NERVE, INTERCOSTAL, 2 OR MORE;  Surgeon: Mike Nuñez MD;  Location: [facility];  Service: Cardiothoracic;;    KNEE SURGERY Right     OLECRANON BURSECTOMY Right 2018    Procedure: BURSECTOMY, OLECRANON;  Surgeon: Jayro Pedraza Sr., MD;  Location: [facility];  Service: Orthopedics;  Laterality: Right;    SURGICAL REMOVAL OF BUNION WITH OSTEOTOMY OF METATARSAL BONE Left 05/10/2019    Procedure: BUNIONECTOMY, WITH METATARSAL OSTEOTOMY;  Surgeon: Srinivasan Villanueva DPM;  Location: [facility];  Service: Podiatry;  Laterality: Left;    SURGICAL REMOVAL OF BUNION WITH  OSTEOTOMY OF METATARSAL BONE Right 06/28/2019    Procedure: BUNIONECTOMY, WITH METATARSAL OSTEOTOMY;  Surgeon: Srinivasan Villanueva DPM;  Location: [facility] OR;  Service: Podiatry;  Laterality: Right;    SURGICAL REMOVAL OF LYMPH NODE Left 5/10/2024    Procedure: EXCISION, LYMPH NODE;  Surgeon: Mike Nuñez MD;  Location: [facility] OR;  Service: Cardiothoracic;  Laterality: Left;    TONSILLECTOMY, ADENOIDECTOMY  1980s    TRANSESOPHAGEAL ECHOCARDIOGRAM WITH POSSIBLE CARDIOVERSION (LAKESHIA W/ POSS CARDIOVERSION) N/A 5/15/2024    Procedure: Transesophageal echo (LAKESHIA) intra-procedure log documentation;  Surgeon: Twan Pelaez MD;  Location: [facility] CATH LAB;  Service: Cardiology;  Laterality: N/A;    TRIGGER FINGER RELEASE Right 04/01/2015    Dr. Milo HALL ROBOTIC RATS,WITH LOBECTOMY,LUNG Left 5/10/2024    Procedure: XI ROBOTIC RATS,WITH LOBECTOMY,LUNG;  Surgeon: Mike Nuñez MD;  Location: [facility] OR;  Service: Cardiothoracic;  Laterality: Left;  Lingulectomy       Labs:  Lab Results   Component Value Date    WBC 5.21 12/11/2024    HGB 12.6 12/11/2024    HCT 38.5 12/11/2024    MCV 71 (L) 12/11/2024     12/11/2024     BMP  Lab Results   Component Value Date     12/11/2024    K 3.7 12/11/2024     12/11/2024    CO2 22 (L) 12/11/2024    BUN 5 (L) 12/11/2024    CREATININE 0.6 12/11/2024    CALCIUM 9.4 12/11/2024    ANIONGAP 10 12/11/2024    ESTGFRAFRICA >60 06/21/2022    EGFRNONAA >60 06/21/2022     Lab Results   Component Value Date    ALT 19 12/11/2024    AST 14 12/11/2024    ALKPHOS 96 12/11/2024    BILITOT 0.3 12/11/2024       Lab Results   Component Value Date    IRON 112 09/12/2022    TIBC 448 11/28/2016    FERRITIN 35 09/12/2022     Lab Results   Component Value Date    [id number] 1440 (H) 08/14/2018     Lab Results   Component Value Date    FOLATE 15.5 11/28/2016     Lab Results   Component Value Date    TSH 3.220 09/12/2022         Review of Systems   Constitutional:  Negative for activity change, appetite  change, chills, diaphoresis, fatigue, fever and unexpected weight change.   HENT:  Negative for congestion, dental problem, drooling, ear discharge, ear pain, facial swelling, hearing loss, mouth sores, nosebleeds, postnasal drip, rhinorrhea, sinus pressure, sneezing, sore throat, tinnitus, trouble swallowing and voice change.    Eyes:  Negative for photophobia, pain, discharge, redness, itching and visual disturbance.   Respiratory:  Negative for cough, choking, chest tightness, shortness of breath, wheezing and stridor.    Cardiovascular:  Negative for chest pain, palpitations and leg swelling.   Gastrointestinal:  Negative for abdominal distention, abdominal pain, anal bleeding, blood in stool, constipation, diarrhea, nausea, rectal pain and vomiting.   Endocrine: Negative for cold intolerance, heat intolerance, polydipsia, polyphagia and polyuria.   Genitourinary:  Negative for decreased urine volume, difficulty urinating, dyspareunia, dysuria, enuresis, flank pain, frequency, genital sores, hematuria, menstrual problem, pelvic pain, urgency, vaginal bleeding, vaginal discharge and vaginal pain.   Musculoskeletal:  Negative for arthralgias, back pain, gait problem, joint swelling, myalgias, neck pain and neck stiffness.   Skin:  Negative for color change, pallor and rash.   Allergic/Immunologic: Negative for environmental allergies, food allergies and immunocompromised state.   Neurological:  Negative for dizziness, tremors, seizures, syncope, facial asymmetry, speech difficulty, weakness, light-headedness, numbness and headaches.   Hematological:  Negative for adenopathy. Does not bruise/bleed easily.   Psychiatric/Behavioral:  Negative for agitation, behavioral problems, confusion, decreased concentration, dysphoric mood, hallucinations, self-injury, sleep disturbance and suicidal ideas. The patient is not nervous/anxious and is not hyperactive.        Objective:      Physical Exam  Vitals reviewed.    Constitutional:       General: She is not in acute distress.     Appearance: She is well-developed. She is not diaphoretic.   HENT:      Head: Normocephalic and atraumatic.      Right Ear: External ear normal.      Left Ear: External ear normal.      Nose: Nose normal.      Right Sinus: No maxillary sinus tenderness or frontal sinus tenderness.      Left Sinus: No maxillary sinus tenderness or frontal sinus tenderness.      Mouth/Throat:      Pharynx: No oropharyngeal exudate.   Eyes:      General: Lids are normal. No scleral icterus.        Right eye: No discharge.         Left eye: No discharge.      Conjunctiva/sclera: Conjunctivae normal.      Right eye: Right conjunctiva is not injected. No hemorrhage.     Left eye: Left conjunctiva is not injected. No hemorrhage.     Pupils: Pupils are equal, round, and reactive to light.   Neck:      Thyroid: No thyromegaly.      Vascular: No JVD.      Trachea: No tracheal deviation.   Cardiovascular:      Rate and Rhythm: Normal rate.   Pulmonary:      Effort: Pulmonary effort is normal. No respiratory distress.      Breath sounds: Normal breath sounds. No stridor.   Chest:      Chest wall: No tenderness.   Abdominal:      General: Bowel sounds are normal. There is no distension.      Palpations: Abdomen is soft. There is no hepatomegaly, splenomegaly or mass.      Tenderness: There is no abdominal tenderness. There is no rebound.   Musculoskeletal:         General: No tenderness. Normal range of motion.      Cervical back: Normal range of motion and neck supple.   Lymphadenopathy:      Cervical: No cervical adenopathy.      Upper Body:      Right upper body: No supraclavicular adenopathy.      Left upper body: No supraclavicular adenopathy.   Skin:     General: Skin is dry.      Findings: No erythema or rash.   Neurological:      Mental Status: She is alert and oriented to person, place, and time.      Cranial Nerves: No cranial nerve deficit.      Coordination:  Coordination normal.   Psychiatric:         Behavior: Behavior normal.         Thought Content: Thought content normal.         Judgment: Judgment normal.             Assessment:      1. Adenocarcinoma of left lung    2. Abnormal results of thyroid function studies    3. Malignant neoplasm of lower lobe of left lung    4. Immunodeficiency due to chemotherapy    5. Immunodeficiency secondary to radiation therapy           Med Onc Chart Routing      Follow up with physician    Follow up with FRED . APAP can see for cycle 2 day 1 CBC CMP   Infusion scheduling note    Injection scheduling note Carboplatin Taxol external beam radiation therapy   Labs    Imaging    Pharmacy appointment    Other referrals                   Plan:     Reviewed information at this time will concurred to start back on carboplatin Taxol with external beam radiation therapy variant patient will have standing labs CBC CMP with concurrent platinum radiation.  Patient crest next generation 3 family members pancreatic cancer.  Orders placed in addition follow with immunotherapy.  Discussed in reviewed article from up-to-date followed to her on stage III non-small cell lung carcinoma consent for chemotherapy signedConsented the patient to the treatment plan and the patient was educated on the planned duration of the treatment and schedule of the treatment administration.  Referral made for advanced care planningAdvance Care Planning              Emerson Moran Jr, MD FACP

## 2025-02-03 ENCOUNTER — HOSPITAL ENCOUNTER (OUTPATIENT)
Dept: RADIATION THERAPY | Facility: HOSPITAL | Age: 75
Discharge: HOME | End: 2025-02-03
Attending: SPECIALIST
Payer: MEDICARE

## 2025-02-10 ENCOUNTER — HOSPITAL ENCOUNTER (OUTPATIENT)
Dept: RADIOLOGY | Facility: HOSPITAL | Age: 75
Discharge: HOME | End: 2025-02-10
Attending: FAMILY MEDICINE
Payer: MEDICARE

## 2025-02-10 ENCOUNTER — OFFICE VISIT (OUTPATIENT)
Dept: PULMONOLOGY | Facility: CLINIC | Age: 75
End: 2025-02-10
Payer: MEDICARE

## 2025-02-10 ENCOUNTER — HOSPITAL ENCOUNTER (OUTPATIENT)
Dept: RADIATION THERAPY | Facility: HOSPITAL | Age: 75
Discharge: HOME | End: 2025-02-10
Attending: FAMILY MEDICINE
Payer: MEDICARE

## 2025-02-10 VITALS
HEIGHT: 63 IN | OXYGEN SATURATION: 97 % | WEIGHT: 142.88 LBS | SYSTOLIC BLOOD PRESSURE: 104 MMHG | BODY MASS INDEX: 25.32 KG/M2 | DIASTOLIC BLOOD PRESSURE: 70 MMHG | RESPIRATION RATE: 16 BRPM | HEART RATE: 105 BPM

## 2025-02-10 DIAGNOSIS — J45.20 MILD INTERMITTENT ASTHMA WITHOUT COMPLICATION: ICD-10-CM

## 2025-02-10 DIAGNOSIS — J84.10 PULMONARY FIBROSIS, UNSPECIFIED: ICD-10-CM

## 2025-02-10 DIAGNOSIS — G47.09 OTHER INSOMNIA: Chronic | ICD-10-CM

## 2025-02-10 DIAGNOSIS — J98.4 CALCIFIED GRANULOMA OF LUNG: ICD-10-CM

## 2025-02-10 DIAGNOSIS — C34.32 MALIGNANT NEOPLASM OF LOWER LOBE OF LEFT LUNG: ICD-10-CM

## 2025-02-10 DIAGNOSIS — C34.92 ADENOCARCINOMA OF LEFT LUNG: Primary | ICD-10-CM

## 2025-02-10 DIAGNOSIS — R94.8 ABNORMAL PET SCAN, MEDIASTINUM: Primary | ICD-10-CM

## 2025-02-10 DIAGNOSIS — R93.5 ABNORMAL CT OF THE ABDOMEN: ICD-10-CM

## 2025-02-10 PROCEDURE — 77014 HC CT GUIDANCE RADIATION THERAPY FLDS PLACEMENT: CPT | Mod: TC | Performed by: SPECIALIST

## 2025-02-10 PROCEDURE — 77014 PR  CT GUIDANCE PLACEMENT RAD THERAPY FIELDS: CPT | Mod: 26,,, | Performed by: SPECIALIST

## 2025-02-10 PROCEDURE — 1126F AMNT PAIN NOTED NONE PRSNT: CPT | Mod: CPTII,S$GLB,, | Performed by: INTERNAL MEDICINE

## 2025-02-10 PROCEDURE — 99999 PR PBB SHADOW E&M-EST. PATIENT-LVL V: CPT | Mod: PBBFAC,,, | Performed by: INTERNAL MEDICINE

## 2025-02-10 PROCEDURE — 3288F FALL RISK ASSESSMENT DOCD: CPT | Mod: CPTII,S$GLB,, | Performed by: INTERNAL MEDICINE

## 2025-02-10 PROCEDURE — 77263 THER RADIOLOGY TX PLNG CPLX: CPT | Mod: ,,, | Performed by: SPECIALIST

## 2025-02-10 PROCEDURE — 3078F DIAST BP <80 MM HG: CPT | Mod: CPTII,S$GLB,, | Performed by: INTERNAL MEDICINE

## 2025-02-10 PROCEDURE — 1159F MED LIST DOCD IN RCRD: CPT | Mod: CPTII,S$GLB,, | Performed by: INTERNAL MEDICINE

## 2025-02-10 PROCEDURE — 77334 RADIATION TREATMENT AID(S): CPT | Mod: TC | Performed by: SPECIALIST

## 2025-02-10 PROCEDURE — 3074F SYST BP LT 130 MM HG: CPT | Mod: CPTII,S$GLB,, | Performed by: INTERNAL MEDICINE

## 2025-02-10 PROCEDURE — 1160F RVW MEDS BY RX/DR IN RCRD: CPT | Mod: CPTII,S$GLB,, | Performed by: INTERNAL MEDICINE

## 2025-02-10 PROCEDURE — 99214 OFFICE O/P EST MOD 30 MIN: CPT | Mod: S$GLB,,, | Performed by: INTERNAL MEDICINE

## 2025-02-10 PROCEDURE — 1101F PT FALLS ASSESS-DOCD LE1/YR: CPT | Mod: CPTII,S$GLB,, | Performed by: INTERNAL MEDICINE

## 2025-02-10 PROCEDURE — 77334 RADIATION TREATMENT AID(S): CPT | Mod: 26,,, | Performed by: SPECIALIST

## 2025-02-10 NOTE — PROGRESS NOTES
Pulmonary Outpatient  Visit     Subjective:       Patient ID: Cassandra Campos is a 74 y.o. female.    Social History     Tobacco Use   Smoking Status Former    Current packs/day: 0.00    Average packs/day: 0.5 packs/day for 42.0 years (21.0 ttl pk-yrs)    Types: Cigarettes    Start date: 1970    Quit date: 2012    Years since quittin.1    Passive exposure: Past   Smokeless Tobacco Never            Chief Complaint: Results          Cassandra Campos is 73 y.o.  Asked to see by Dr Ball  Abn imaging CT abdomen   No cough no wheezing no shortness of breath   Last seen in this department in to any to any   History of asthma on inhaler   Former smoker quit 20 years ago   Retired teacher   No past no history of cancer   No family history of cancer   Sinus congestion - hole in nose from snorting cocaine  Smoked 1ppd for many years - not smoking now, 15 pack years  Denies weight loss hemoptysis no TB exposure       Imaging reviewed with patient          2024  Followup  Revewed PFT  ABG  Walk  CT biopsy for   Will do EBUS on 04/10  Will need cardiology clearance      2024  Followup  Recent PET CT  Left Hilar increased avidity  FDG avid left hilar adenopathy/mass now demonstrating an SUV max of 9.4 as compared to 4.7 on the prior exam.  Images reveiwed with patient    Path  Regional LN were -ve  REGIONAL LYMPH NODES   Regional Lymph Node Status:  Regional lymph nodes present and negative for tumor      Number of Lymph Nodes with Tumor:  0      Number of Lymph Nodes Examined:  5      Xavi Sites Examined:  Level 10, level 9, level 6, level 5, level 4L     02/10/2025   Follow-up visit   Hilar biopsies were positive for local recurrence of resected lung cancer   +ve 10 L  Planned XRT  No respiratory issues  Currently taking lorazepam, Lunesta and Elavil for sleep.                     Review of Systems   Constitutional: Negative.    Eyes: Negative.     Respiratory:  Negative for snoring, cough, sputum production, shortness of breath, wheezing, use of rescue inhaler and somnolence.    Cardiovascular: Negative.    Genitourinary: Negative.    Endocrine: endocrine negative    Musculoskeletal: Negative.    Skin: Negative.    Gastrointestinal: Negative.    Psychiatric/Behavioral: Negative.     All other systems reviewed and are negative.      Outpatient Encounter Medications as of 2/10/2025   Medication Sig Dispense Refill    albuterol (PROVENTIL/VENTOLIN HFA) 90 mcg/actuation inhaler INHALE 2 PUFFS INTO THE LUNGS EVERY 4 HOURS AS NEEDED FOR WHEEZING OR SHORTNESS OF BREATH. 18 g 1    amitriptyline (ELAVIL) 50 MG tablet Take 1 tablet (50 mg total) by mouth every evening. 90 tablet 3    aspirin (ECOTRIN) 81 MG EC tablet Take 1 tablet (81 mg total) by mouth once daily.      blood sugar diagnostic Strp use to test blood sugar 1-2 times a day 200 strip 3    blood-glucose meter (ACCU-CHEK GUIDE ME GLUCOSE MTR) Misc use to check blood glucose 2 times a day 1 each 0    blood-glucose meter (ACCU-CHEK GUIDE ME GLUCOSE MTR) Misc To check blood glucose 1-2 times daily, to use with insurance preferred meter 1 each 0    eszopiclone (LUNESTA) 3 mg Tab Take 1 tablet (3 mg total) by mouth every evening. 30 tablet 1    lancets Misc Use to test blood glucose 1-2 times a day, discard lancet after each use 200 each 3    LORazepam (ATIVAN) 1 MG tablet Take 1 tablet (1 mg total) by mouth 2 (two) times daily. 60 tablet 1    losartan (COZAAR) 25 MG tablet Take 1 tablet (25 mg total) by mouth once daily. 90 tablet 2    metoprolol succinate (TOPROL-XL) 50 MG 24 hr tablet Take 1 tablet (50 mg total) by mouth once daily. 90 tablet 2    omeprazole (PRILOSEC) 20 MG capsule Take 1 capsule (20 mg total) by mouth once daily. 90 capsule 2    semaglutide (OZEMPIC) 2 mg/dose (8 mg/3 mL) PnIj Inject 2 mg into the skin every 7 days. 9 mL 0    traMADoL (ULTRAM) 50 mg tablet Take 1 tablet (50 mg total) by  mouth 2 (two) times a day. 60 each 0    traMADoL (ULTRAM) 50 mg tablet Take 1 tablet (50 mg total) by mouth every 12 (twelve) hours as needed for Pain. Greater than 7 day supply medically necessary. 60 tablet 2    triamcinolone acetonide 0.025 % Lotn Apply small amount to affective areas twice a day  take cool showers or baths 60 mL 0    [] sulfamethoxazole-trimethoprim 800-160mg (BACTRIM DS) 800-160 mg Tab Take 1 tablet by mouth 2 (two) times daily. for 10 days 20 tablet 0    [DISCONTINUED] eszopiclone (LUNESTA) 3 mg Tab Take 1 tablet (3 mg total) by mouth every evening. 30 tablet 1    [DISCONTINUED] LORazepam (ATIVAN) 1 MG tablet Take 1 tablet (1 mg total) by mouth 2 (two) times daily. 60 tablet 1    [DISCONTINUED] semaglutide (OZEMPIC) 2 mg/dose (8 mg/3 mL) PnIj Inject 2 mg into the skin every 7 days. 3 mL 2     Facility-Administered Encounter Medications as of 2/10/2025   Medication Dose Route Frequency Provider Last Rate Last Admin    sodium chloride 0.9% flush 10 mL  10 mL Intravenous PRN Mike Nuñez MD        sodium chloride 0.9% flush 10 mL  10 mL Intravenous PRN Mike Nuñez MD           The following portions of the patient's history were reviewed and updated as appropriate: She  has a past medical history of Arthritis, Bilateral bunions, Borderline glaucoma, De Quervain's disease (radial styloid tenosynovitis), Gastritis, Hydradenitis, Hyperlipidemia, Hypertension, Insomnia, Migraines (2000), Nasal septum perforation, Obesity, Pneumonia, Restrictive airway disease, Sleep apnea, SVT (supraventricular tachycardia) (2013), Trigger finger, and Type 2 diabetes mellitus ().  She does not have any pertinent problems on file.  She  has a past surgical history that includes Cholecystectomy (2014); Carpal tunnel release; Axillary hidradenitis excision (Bilateral); Trigger finger release (Right, 2015); Cyst Removal (2015); TONSILLECTOMY, ADENOIDECTOMY ();   section (1979); gastric sleeve (02/13/2017); Knee surgery (Right); Cataract extraction (Bilateral); Breast biopsy (Bilateral); Olecranon bursectomy (Right, 07/25/2018); Bone exostosis excision (Right, 07/25/2018); Surgical removal of bunion with osteotomy of metatarsal bone (Left, 05/10/2019); Surgical removal of bunion with osteotomy of metatarsal bone (Right, 06/28/2019); De Quervain's release (Left, 01/16/2020); Colonoscopy w/ polypectomy (10/02/2020); Colonoscopy (N/A, 10/02/2020); De Quervain's release (Right, 11/20/2020); Eye surgery; Breast surgery (07/1998); Carpal tunnel release (Right, 02/09/2024); Carpal tunnel release (Left, 03/08/2024); Endobronchial ultrasound (Bilateral, 04/10/2024); transesophageal echocardiogram with possible cardioversion (romero w/ poss cardioversion) (N/A, 5/15/2024); xi robotic rats,with lobectomy,lung (Left, 5/10/2024); Surgical removal of lymph node (Left, 5/10/2024); Injection of anesthetic agent around multiple intercostal nerves (5/10/2024); Bronchoscopy (Bilateral, 1/15/2025); and Endobronchial ultrasound (Bilateral, 1/15/2025).  Her family history includes Diabetes in her cousin and maternal aunt; Hypertension in her maternal grandmother; Prostate cancer in her brother.  She  reports that she quit smoking about 13 years ago. Her smoking use included cigarettes. She started smoking about 55 years ago. She has a 21 pack-year smoking history. She has been exposed to tobacco smoke. She has never used smokeless tobacco. She reports that she does not currently use alcohol after a past usage of about 1.0 standard drink of alcohol per week. She reports that she does not use drugs.  She has a current medication list which includes the following prescription(s): albuterol, amitriptyline, aspirin, blood sugar diagnostic, blood-glucose meter, blood-glucose meter, eszopiclone, lancets, lorazepam, losartan, metoprolol succinate, omeprazole, ozempic, tramadol, tramadol, and triamcinolone  acetonide, and the following Facility-Administered Medications: sodium chloride 0.9% and sodium chloride 0.9%.  Current Outpatient Medications on File Prior to Visit   Medication Sig Dispense Refill    albuterol (PROVENTIL/VENTOLIN HFA) 90 mcg/actuation inhaler INHALE 2 PUFFS INTO THE LUNGS EVERY 4 HOURS AS NEEDED FOR WHEEZING OR SHORTNESS OF BREATH. 18 g 1    amitriptyline (ELAVIL) 50 MG tablet Take 1 tablet (50 mg total) by mouth every evening. 90 tablet 3    aspirin (ECOTRIN) 81 MG EC tablet Take 1 tablet (81 mg total) by mouth once daily.      blood sugar diagnostic Strp use to test blood sugar 1-2 times a day 200 strip 3    blood-glucose meter (ACCU-CHEK GUIDE ME GLUCOSE MTR) Misc use to check blood glucose 2 times a day 1 each 0    blood-glucose meter (ACCU-CHEK GUIDE ME GLUCOSE MTR) Misc To check blood glucose 1-2 times daily, to use with insurance preferred meter 1 each 0    eszopiclone (LUNESTA) 3 mg Tab Take 1 tablet (3 mg total) by mouth every evening. 30 tablet 1    lancets Misc Use to test blood glucose 1-2 times a day, discard lancet after each use 200 each 3    LORazepam (ATIVAN) 1 MG tablet Take 1 tablet (1 mg total) by mouth 2 (two) times daily. 60 tablet 1    losartan (COZAAR) 25 MG tablet Take 1 tablet (25 mg total) by mouth once daily. 90 tablet 2    metoprolol succinate (TOPROL-XL) 50 MG 24 hr tablet Take 1 tablet (50 mg total) by mouth once daily. 90 tablet 2    omeprazole (PRILOSEC) 20 MG capsule Take 1 capsule (20 mg total) by mouth once daily. 90 capsule 2    semaglutide (OZEMPIC) 2 mg/dose (8 mg/3 mL) PnIj Inject 2 mg into the skin every 7 days. 9 mL 0    traMADoL (ULTRAM) 50 mg tablet Take 1 tablet (50 mg total) by mouth 2 (two) times a day. 60 each 0    traMADoL (ULTRAM) 50 mg tablet Take 1 tablet (50 mg total) by mouth every 12 (twelve) hours as needed for Pain. Greater than 7 day supply medically necessary. 60 tablet 2    triamcinolone acetonide 0.025 % Lotn Apply small amount to  "affective areas twice a day  take cool showers or baths 60 mL 0     Current Facility-Administered Medications on File Prior to Visit   Medication Dose Route Frequency Provider Last Rate Last Admin    sodium chloride 0.9% flush 10 mL  10 mL Intravenous PRN Mike Nuñez MD        sodium chloride 0.9% flush 10 mL  10 mL Intravenous PRN Mike Nuñez MD         She has No Known Allergies..      BP Readings from Last 3 Encounters:   02/10/25 104/70   01/31/25 114/75   01/31/25 128/79     Snoring / Sleep:            MMRC Dyspnea Scale (4 is worst)     [x] MMRC 0: Dyspneic on strenuous excercise (0 points)    [] MMRC 1: Dyspneic on walking a slight hill (0 points)    [] MMRC 2: Dyspneic on walking level ground; must stop occasionally due to breathlessness (1 point)    [] MMRC 3: Must stop for breathlessness after walking 100 yards or after a few minutes (2 points)    [] MMRC 4: Cannot leave house; breathless on dressing/undressing (3 points)       Asthma Control Test  In the past 4  weeks, how much of the time did your asthma keep you from getting as much done at work, school or at home?: None of the time  During the past 4 weeks, how often have you had shortness of breath?: Not at all  During the past 4 weeks, how often did your asthma symptoms (wheezing, couging, shortness of breath, chest tightness or pain) wake you up at night or earlier than usual in the morning?: Not at all  During the past 4 weeks, how often have you used your rescue inhaler or nebulizer medication (such as albuterol)?: Not at all  How would you rate your asthma control during the past 4 weeks?: Well controlled  If your score is 19 or less, your asthma may not be under control: 24                  No data to display                          Objective:     Vital Signs (Most Recent)  Vital Signs  Pulse: 105  Resp: 16  SpO2: 97 %  BP: 104/70  Patient Position: Sitting  Pain Score: 0-No pain  Height and Weight  Height: 5' 3" (160 cm)  Weight: " 64.8 kg (142 lb 13.7 oz)  BSA (Calculated - sq m): 1.7 sq meters  BMI (Calculated): 25.3  Weight in (lb) to have BMI = 25: 140.8]  Wt Readings from Last 2 Encounters:   02/10/25 64.8 kg (142 lb 13.7 oz)   01/31/25 65.2 kg (143 lb 11.8 oz)       Physical Exam  Vitals and nursing note reviewed.   Constitutional:       Appearance: She is normal weight.   HENT:      Head: Normocephalic and atraumatic.      Nose: Nose normal.   Eyes:      Pupils: Pupils are equal, round, and reactive to light.   Cardiovascular:      Rate and Rhythm: Normal rate and regular rhythm.      Pulses: Normal pulses.      Heart sounds: Normal heart sounds.   Pulmonary:      Effort: Pulmonary effort is normal.      Breath sounds: Normal breath sounds.   Abdominal:      General: Bowel sounds are normal.      Palpations: Abdomen is soft.   Musculoskeletal:      Cervical back: Normal range of motion.   Skin:     General: Skin is warm.   Neurological:      General: No focal deficit present.      Mental Status: She is alert and oriented to person, place, and time.   Psychiatric:         Mood and Affect: Mood normal.          Laboratory  Lab Results   Component Value Date    WBC 5.69 01/31/2025    RBC 5.27 01/31/2025    HGB 12.6 01/31/2025    HCT 39.3 01/31/2025    MCV 75 (L) 01/31/2025    MCH 23.9 (L) 01/31/2025    MCHC 32.1 01/31/2025    RDW 17.1 (H) 01/31/2025     01/31/2025    MPV 8.9 (L) 01/31/2025    GRAN 2.1 01/31/2025    GRAN 36.4 (L) 01/31/2025    LYMPH 2.9 01/31/2025    LYMPH 51.3 (H) 01/31/2025    MONO 0.5 01/31/2025    MONO 8.8 01/31/2025    EOS 0.2 01/31/2025    BASO 0.04 01/31/2025    EOSINOPHIL 2.6 01/31/2025    BASOPHIL 0.7 01/31/2025       BMP  Lab Results   Component Value Date     01/31/2025    K 3.8 01/31/2025     01/31/2025    CO2 30 (H) 01/31/2025    BUN 10 01/31/2025    CREATININE 0.7 01/31/2025    CALCIUM 9.9 01/31/2025    ANIONGAP 5 (L) 01/31/2025    ESTGFRAFRICA >60 06/21/2022    EGFRNONAA >60 06/21/2022     "AST 60 (H) 01/31/2025     (H) 01/31/2025    PROT 8.1 01/31/2025          No results found for: "IGE"     No results found for: "ASPERGILLUS"  No results found for: "AFUMIGATUSCL"     No results found for: "ACE"     Diagnostic Results:  I have personally reviewed today the following studies:    MRI Brain W WO Contrast  Narrative: EXAMINATION:  MRI BRAIN W WO CONTRAST    CLINICAL HISTORY:  Malignant neoplasm of unspecified part of left bronchus or lungBrain metastases suspected;    TECHNIQUE:  Standard multiplanar noncontrast and contrast enhanced sequences of the brain performed with 6cc Gadavist.    COMPARISON:  07/27/2024 CT scan    FINDINGS:  Intracranial compartment:    Ventricles and sulci are mildly enlarged in size for age without evidence of hydrocephalus. No extra-axial blood or fluid collections.    There is moderate white matter hyperintensity most consistent with age-related degeneration.  No mass lesion, acute hemorrhage, edema or acute infarct. The diffusion-weighted sequence is negative.  No evidence of acute infarct.    No abnormal enhancement.    Normal vascular flow voids are preserved.    Skull/extracranial contents (limited evaluation): Bone marrow signal intensity is normal.  Impression: No evidence of intracranial metastatic disease.    Electronically signed by: Jaime Gomes MD  Date:    01/09/2025  Time:    08:06         No results for input(s): "PH", "PCO2", "PO2", "HCO3", "POCSATURATED", "BE" in the last 72 hours.                  1. Lung, right middle lobe, bronchioalveolar lavage (thin prep and cell block):   - Negative for malignancy     2. Lymph node, station 7, fine-needle aspiration (smear slides and cell block):   - Negative for malignancy   - Lymphocytes present   - Benign bronchial cells present     3. Lymph node, 10 L, fine needle aspiration (smear slides and cell block):    - Metastatic carcinoma, consistent with non-small cell carcinoma of pulmonary origin   - Scant " lymphoid component identified   - Immunostains were performed with appropriately reactive controls and the cells of interest are positive for CK7 and TTF 1 and are negative for p40, supporting the above interpretation     COMMENT   Additional tissue is present for molecular studies upon request.          Assessment/Plan:     Problem List Items Addressed This Visit       Insomnia (Chronic)     Currently on Lunesta and Elavil.         Calcified granuloma of lung    Mild intermittent asthma    Malignant neoplasm of lower lobe of left lung    Abnormal PET scan, mediastinum - Primary     EBUS specimens were consistent with local recurrence   Due for simulation planning today and radiation therapy          Other Visit Diagnoses       Abnormal CT of the abdomen        Pulmonary fibrosis, unspecified                  Planned XRT 10 L       Follow up in about 3 months (around 5/10/2025).    This note was prepared using voice recognition system and is likely to have sound alike errors that may have been overlooked even after proof reading.  Please call me with any questions    Discussed diagnosis, its evaluation, treatment and usual course. All questions answered.    Thank you for the courtesy of participating in the care of this patient    Adrian Robin MD      Personal Diagnostic Review  []  CXR    []  ECHO    []  ONSAT    []  6MWD    []  LABS    []  CHEST CT    []  PET CT    []  Biopsy results

## 2025-02-10 NOTE — ASSESSMENT & PLAN NOTE
EBUS specimens were consistent with local recurrence   Due for simulation planning today and radiation therapy

## 2025-02-11 ENCOUNTER — TELEPHONE (OUTPATIENT)
Dept: HEMATOLOGY/ONCOLOGY | Facility: CLINIC | Age: 75
End: 2025-02-11
Payer: MEDICARE

## 2025-02-11 PROBLEM — Z51.5 ENCOUNTER FOR PALLIATIVE CARE: Status: ACTIVE | Noted: 2025-02-11

## 2025-02-11 NOTE — ASSESSMENT & PLAN NOTE
Followed by pulmonology, oncology, radiation oncology  To begin PACLitaxel Carboplatin weekly plus radiation with goal of control. She is aware of goal of tx  She had questions/concerns about treatment that were addressed today.

## 2025-02-11 NOTE — TELEPHONE ENCOUNTER
"Call placed to pt to discuss and schedule all needed apts to begin treatment per Dr. Moran; reminded pt of my contact info and my roll in her care. Per Rad/onc pt is scheduled to start rad tx on 2/20 therefore chemo will also begin 2/20. All needed appts scheduled with pt agreeable. Pt denies any known dental issues/needs reporting she has dentures. Pt denies any further needs/concerns at this time though I encouraged her to reach out at any time.    Oncology Navigation   Intake  Cancer Type: LDCT/Incidental Lung Finding  Type of Referral: Internal  Date of Referral: 12/11/24  Initial Nurse Navigator Contact: 12/13/24  Referral to Initial Contact Timeline (days): 2  First Appointment Available: 12/24/24  Appointment Date: 12/24/24  First Available Date vs. Scheduled Date (days): 0     Treatment  Current Status: Active  Date Presented to Tumor Board: 12/27/24    Surgery: N/A    Medical Oncologist: Dean  Consult Date: 12/24/24  Chemotherapy: Planned  Chemotherapy Regimen: carbo/taxol    Radiation Therapy: Planned  Radiation Oncologist: Aj  Start Date: 02/20/25       General Referrals: Chemo Education       Radiation Oncologist: Aj    Support Systems: Family members  Barriers of Care: Barriers to Care "Assessment completed-no barriers noted"     Acuity      Follow Up  No follow-ups on file.        "

## 2025-02-11 NOTE — PROGRESS NOTES
Palliative Medicine Clinic Note        Consult Requested By: Dr. Emerson Moran      Reason for Consult: goals of care and advanced care planning    Chief Complaint:   Chief Complaint   Patient presents with    ACP    GOC        ASSESSMENT/PLAN:      Plan/Recommendations:  Problem List Items Addressed This Visit          Oncology    Malignant neoplasm of lower lobe of left lung     Followed by pulmonology, oncology, radiation oncology  To begin PACLitaxel Carboplatin weekly plus radiation with goal of control. She is aware of goal of tx  She had questions/concerns about treatment that were addressed today.              Palliative Care    Encounter for palliative care - Primary     Goals of care: Hopeful she will tolerate tx well and gain control of cancer.   Advanced directive: desires DNR. We reviewed [place]; she is considering completing.   HC POA: son/only child Claude Monroy Jr is SDM  Symptoms: no cancer related sx today  Follow up: PRN            Other Visit Diagnoses       Adenocarcinoma of left lung        Abnormal results of thyroid function studies                Advance Care Planning   Advance Directives:   Living Will: No (desies natural and peaceful death)        Oral Declaration: Yes  [place]: No (she will reivew [place] document with ehr son this weekend.)    Do Not Resuscitate Status: Yes (desires DNR, natural and peaceful death)    Medical Power of : Yes (Her son Claude Monroy Jr is SDM)      Decision Making:  Patient answered questions  Goals of Care: The patient endorses that what is most important right now is to focus on remaining as independent as possible and curative/life-prolongation (regardless of treatment burdens)    Accordingly, we have decided that the best plan to meet the patient's goals includes continuing with treatment.           Thank you for involving me in the care of this patient.     No follow-ups on file.    Future Appointments   Date Time Provider Department Center    2/12/2025  2:00 PM Naman Barton MD [facility]   2/19/2025  9:50 AM RACHELLE ROUPIOTR CC LAB [facility] LAB DS [facility]   2/19/2025 10:00 AM [facility] HEMATOLOGY ONCOLOGY EDU INJ [facility] HEM ONC [facility]   2/19/2025  1:00 PM Emerson Moran MD [facility] HEM ONC [facility]   2/20/2025  9:00 AM CHAIR [facility] INFSN [facility]   2/24/2025  1:20 PM Jessica Ball PA-C [facility] IN PN [facility]   4/1/2025 10:30 AM Emil Zhang MD [facility] IM [facility]   5/12/2025  1:20 PM Adiran Robin MD [facility] PULMSVC [facility]   6/6/2025 11:20 AM Twan Pelaez MD [facility] CARDIO [facility]        SUBJECTIVE:      History of Present Illness / Interval History:  Cassandra Campos is 74 y.o. female with recurrent biopsy-proven non-small cell lung carcinoma stage III, S/P lingular mass resection, continues radiation to begin weekly PACLitaxel CARBOplatin this month. Medical history also significant for anxiety, mild intermittent asthma, HTN, atrial flutter, DMII, .  Presents to Palliative Care Clinic for advance care planning, and additional support..       12/24/25 Oncology  Robotic resection of left lung mass. With new finding in left hilar. Would recommend Pulmonary consultation for bronchoscopic evaluation and biopsy. Discussed possibility of treatment with concurrent chemoradiation. But would like to present at multidisciplinary conference. For consideration but need to establish diagnosis patient is concerned whether not she has small cell lung carcinoma or adenocarcinoma variant     1/31/25 Oncology  Reviewed information at this time will concurred to start back on carboplatin Taxol with external beam radiation therapy variant patient will have standing labs CBC CMP with concurrent platinum radiation. Patient crest next generation 3 family members pancreatic cancer.     2/10/25 Pulmonology  Follow-up visit   Hilar biopsies were positive for local recurrence of resected lung cancer   +ve 10 L  Planned XRT  No respiratory  issues  Currently taking lorazepam, Lunesta and Elavil for sleep.        2/12/25  History obtained from: patient and medical record.      She is optimistic about gaining control of her cancer. Anxious to begin treatment.     Chronic shoulder and knee pain. Attributes to OA. Relieved partially by tramadol.   Followed by Dr Willis.     Takes semaglutide. Feels like it's controlling her CBGs. No side effects.     Has living will at home.   Ms. Campos would want a natural and peaceful death if her heart stops/no longer breathing. She does not want to be resuscitated. She has discussed this with her family and they are aware of her wishes. We reviewed [place] document. She wants to take home and discuss with her son.       Today we discussed role of palliative care, symptom management, goals of care, and advanced care planning.        ROS:  Review of Systems    Review of Symptoms      Symptom Assessment (ESAS 0-10 Scale)  Pain:  7  Dyspnea:  0  Anxiety:  0  Nausea:  0  Depression:  0  Anorexia:  0  Fatigue:  0  Insomnia:  6  Restlessness:  0  Agitation:  0         Pain Assessment:    Location(s): leg    Leg       Location: bilateral        Quality: Aching        Quantity: 7/10 in intensity        Chronicity: Onset 3 year(s) ago, unchanged        Aggravating Factors: None        Alleviating Factors: Opiates and movement        Associated Symptoms: None    ECOG Performance Status stGstrstastdstest:st st1st Living Arrangements:  Lives alone    Psychosocial/Cultural:   See Palliative Psychosocial Note: Yes  Retired teacher, middle school biology.   . Lives alone. Her son Claude Monroy Jr is her only child.   Enjoys substitute teaching for  children.   Has two sisters/double first cousins will help with her direct care if needed.     **Primary  to Follow**  Palliative Care  Consult: No        Medications:    Current Outpatient Medications:     albuterol (PROVENTIL/VENTOLIN HFA) 90 mcg/actuation  inhaler, INHALE 2 PUFFS INTO THE LUNGS EVERY 4 HOURS AS NEEDED FOR WHEEZING OR SHORTNESS OF BREATH., Disp: 18 g, Rfl: 1    amitriptyline (ELAVIL) 50 MG tablet, Take 1 tablet (50 mg total) by mouth every evening., Disp: 90 tablet, Rfl: 3    aspirin (ECOTRIN) 81 MG EC tablet, Take 1 tablet (81 mg total) by mouth once daily., Disp: , Rfl:     blood sugar diagnostic Strp, use to test blood sugar 1-2 times a day, Disp: 200 strip, Rfl: 3    blood-glucose meter (ACCU-CHEK GUIDE ME GLUCOSE MTR) Misc, use to check blood glucose 2 times a day, Disp: 1 each, Rfl: 0    blood-glucose meter (ACCU-CHEK GUIDE ME GLUCOSE MTR) Misc, To check blood glucose 1-2 times daily, to use with insurance preferred meter, Disp: 1 each, Rfl: 0    eszopiclone (LUNESTA) 3 mg Tab, Take 1 tablet (3 mg total) by mouth every evening., Disp: 30 tablet, Rfl: 1    lancets Misc, Use to test blood glucose 1-2 times a day, discard lancet after each use, Disp: 200 each, Rfl: 3    LORazepam (ATIVAN) 1 MG tablet, Take 1 tablet (1 mg total) by mouth 2 (two) times daily., Disp: 60 tablet, Rfl: 1    losartan (COZAAR) 25 MG tablet, Take 1 tablet (25 mg total) by mouth once daily., Disp: 90 tablet, Rfl: 2    metoprolol succinate (TOPROL-XL) 50 MG 24 hr tablet, Take 1 tablet (50 mg total) by mouth once daily., Disp: 90 tablet, Rfl: 2    omeprazole (PRILOSEC) 20 MG capsule, Take 1 capsule (20 mg total) by mouth once daily., Disp: 90 capsule, Rfl: 2    semaglutide (OZEMPIC) 2 mg/dose (8 mg/3 mL) PnIj, Inject 2 mg into the skin every 7 days., Disp: 9 mL, Rfl: 0    traMADoL (ULTRAM) 50 mg tablet, Take 1 tablet (50 mg total) by mouth 2 (two) times a day., Disp: 60 each, Rfl: 0    traMADoL (ULTRAM) 50 mg tablet, Take 1 tablet (50 mg total) by mouth every 12 (twelve) hours as needed for Pain. Greater than 7 day supply medically necessary., Disp: 60 tablet, Rfl: 2    triamcinolone acetonide 0.025 % Lotn, Apply small amount to affective areas twice a day  take cool showers or  baths, Disp: 60 mL, Rfl: 0    Current Facility-Administered Medications:     sodium chloride 0.9% flush 10 mL, 10 mL, Intravenous, PRSavannah YOO Shafi G., MD    sodium chloride 0.9% flush 10 mL, 10 mL, Intravenous, PRNSavannah Shafi G., MD    External  database queried on 02/12/2025  by Candy BARBOSA :     N/A    Review of patient's allergies indicates:  No Known Allergies     OBJECTIVE:   Physical Exam:  Vitals: Temp: 97 °F (36.1 °C) (02/12/25 1111)  Pulse: 96 (02/12/25 1111)  BP: 104/72 (02/12/25 1200)  SpO2: 97 % (02/12/25 1111)    Physical Exam  HENT:      Mouth/Throat:      Mouth: Mucous membranes are moist.   Eyes:      General: No scleral icterus.  Pulmonary:      Effort: Pulmonary effort is normal.   Skin:     General: Skin is warm and dry.   Neurological:      Mental Status: She is alert.      Gait: Gait normal.   Psychiatric:         Mood and Affect: Mood normal.         Behavior: Behavior normal.         Wt Readings from Last 3 Encounters:   02/12/25 1111 66.1 kg (145 lb 11.6 oz)   02/10/25 1104 64.8 kg (142 lb 13.7 oz)   01/31/25 1351 65.2 kg (143 lb 11.8 oz)     BP Readings from Last 3 Encounters:   02/12/25 104/72   02/10/25 104/70   01/31/25 114/75       Labs:  Lab Results   Component Value Date    WBC 5.69 01/31/2025    HGB 12.6 01/31/2025    HCT 39.3 01/31/2025    MCV 75 (L) 01/31/2025     01/31/2025       Sodium   Date Value Ref Range Status   01/31/2025 141 136 - 145 mmol/L Final     Potassium   Date Value Ref Range Status   01/31/2025 3.8 3.5 - 5.1 mmol/L Final     Glucose   Date Value Ref Range Status   01/31/2025 82 70 - 110 mg/dL Final     BUN   Date Value Ref Range Status   01/31/2025 10 8 - 23 mg/dL Final     Creatinine   Date Value Ref Range Status   01/31/2025 0.7 0.5 - 1.4 mg/dL Final     Calcium   Date Value Ref Range Status   01/31/2025 9.9 8.7 - 10.5 mg/dL Final     Total Protein   Date Value Ref Range Status   01/31/2025 8.1 6.0 - 8.4 g/dL Final     Albumin    Date Value Ref Range Status   01/31/2025 3.2 (L) 3.5 - 5.2 g/dL Final     AST   Date Value Ref Range Status   01/31/2025 60 (H) 10 - 40 U/L Final     ALT   Date Value Ref Range Status   01/31/2025 117 (H) 10 - 44 U/L Final     eGFR   Date Value Ref Range Status   01/31/2025 >60 >60 mL/min/1.73 m^2 Final       Imaging:  MRI Brain W WO Contrast  Narrative: EXAMINATION:  MRI BRAIN W WO CONTRAST    CLINICAL HISTORY:  Malignant neoplasm of unspecified part of left bronchus or lungBrain metastases suspected;    TECHNIQUE:  Standard multiplanar noncontrast and contrast enhanced sequences of the brain performed with 6cc Gadavist.    COMPARISON:  07/27/2024 CT scan    FINDINGS:  Intracranial compartment:    Ventricles and sulci are mildly enlarged in size for age without evidence of hydrocephalus. No extra-axial blood or fluid collections.    There is moderate white matter hyperintensity most consistent with age-related degeneration.  No mass lesion, acute hemorrhage, edema or acute infarct. The diffusion-weighted sequence is negative.  No evidence of acute infarct.    No abnormal enhancement.    Normal vascular flow voids are preserved.    Skull/extracranial contents (limited evaluation): Bone marrow signal intensity is normal.  Impression: No evidence of intracranial metastatic disease.    Electronically signed by: Jaime Gomes MD  Date:    01/09/2025  Time:    08:06    12/23/24 PET CT  Chest: FDG avid left hilar adenopathy/mass now demonstrating an SUV max of 9.4 as compared to 4.7 on the prior exam.  The soft tissue mass component also appears more prominent in size when compared to the prior exam and measures approximately 1.9 by 1.4 cm in size as compared to approximately 1.3 x 0.9 cm in size on the prior study.  The mass within the lingula appears to have been resected in the interval.  Equivocal subscapular soft tissue density with FDG uptake seen on the left demonstrate an SUV max of 4.5.  Possibilities  include metastatic disease versus friction related uptake/inflammation and elastofibroma dorsi.  Benign etiology favored.  There is also some low level uptake in a similar region on the right without a obvious soft tissue mass.     Abdomen/Pelvis: Normal physiologic uptake noted within the liver, spleen, urinary tract, and bowel.The adrenals are unremarkable in appearance.  There is evidence for prior cholecystectomy.     Skeletal: There appears to be degenerative type uptake seen in the region of either shoulder and the bilateral hips and at a few levels along the facet joints of the cervical spine and to a lesser degree the lumbar spine.    Impression:     1. Interval resection of the previously noted lingular mass with no evidence to suggest recurrent or residual disease at the postsurgical site.  2. FDG avid left hilar adenopathy/mass, increased in size and FDG uptake when compared to the prior exam most consistent with disease progression.  3. Remaining findings as discussed above.        Electronically signed by:Solomon Pennington,   Date:                                            12/23/2024 12/11/2024 CT abd/pelvis   Impression:     1.  Detrimental change.  Interval development of a 1.2 cm left lower lobe nodule.  Interval increase in size of a left hilar lymph node measuring 2.1 cm.  Interval development of a subtle 1.8 cm right hepatic lobe lesion near the gallbladder fossa.  Considering the patient's history, these changes must be considered neoplastic in origin until proven otherwise.  A repeat PET-CT scan is recommended.  2. Fluid filled loops of small bowel.  1 or 2 borderline dilated small bowel loops within the left upper quadrant similar to the comparison study again seen.  Gastroenteritis or other diarrheal disease is could have this appearance in the right clinical setting.  3.  Progressive dilation of the intrahepatic and extrahepatic biliary tree.  Correlation with liver function tests and  bilirubin level recommended.  4.  Negative for acute process otherwise.  Normal appendix.  Negative for renal stone disease or hydronephrosis.  Negative for free air.  Negative for inflammatory changes.  5.  Stable enlarged mesenteric and shoddy retroperitoneal lymph nodes in the epigastric region.  Cholecystectomy clips and gastric sleeve postoperative changes again seen.  Other stable findings as noted above.        Electronically signed by:Delfino Cox MD  Date:                                            12/11/2024      I spent a total of 40 minutes on the day of the visit. This includes face to face time in discussion of goals of care, symptom assessment, coordination of care and emotional support.  This also includes non-face to face time preparing to see the patient (eg, review of tests/imaging), obtaining and/or reviewing separately obtained history, documenting clinical information in the electronic or other health record, independently interpreting results and communicating results to the patient/family/caregiver, or care coordinator.     Additional 18 min time spent on a voluntary advance care planning and /or goals of care discussion, providing emotional support, formulating and communicating prognosis and exploring burden/benefit of various approaches of treatment.       Candy Roa, NAKIA

## 2025-02-12 ENCOUNTER — OFFICE VISIT (OUTPATIENT)
Dept: PALLIATIVE MEDICINE | Facility: CLINIC | Age: 75
End: 2025-02-12
Payer: MEDICARE

## 2025-02-12 ENCOUNTER — OFFICE VISIT (OUTPATIENT)
Dept: SPORTS MEDICINE | Facility: CLINIC | Age: 75
End: 2025-02-12
Payer: MEDICARE

## 2025-02-12 VITALS
BODY MASS INDEX: 25.82 KG/M2 | OXYGEN SATURATION: 97 % | WEIGHT: 145.75 LBS | SYSTOLIC BLOOD PRESSURE: 104 MMHG | HEIGHT: 63 IN | DIASTOLIC BLOOD PRESSURE: 72 MMHG | HEART RATE: 96 BPM | TEMPERATURE: 97 F

## 2025-02-12 VITALS — WEIGHT: 145.75 LBS | BODY MASS INDEX: 25.82 KG/M2 | HEIGHT: 63 IN

## 2025-02-12 DIAGNOSIS — M17.0 PRIMARY OSTEOARTHRITIS OF BOTH KNEES: Primary | ICD-10-CM

## 2025-02-12 DIAGNOSIS — C34.32 MALIGNANT NEOPLASM OF LOWER LOBE OF LEFT LUNG: ICD-10-CM

## 2025-02-12 DIAGNOSIS — Z51.5 ENCOUNTER FOR PALLIATIVE CARE: Primary | ICD-10-CM

## 2025-02-12 DIAGNOSIS — R94.6 ABNORMAL RESULTS OF THYROID FUNCTION STUDIES: ICD-10-CM

## 2025-02-12 DIAGNOSIS — C34.92 ADENOCARCINOMA OF LEFT LUNG: ICD-10-CM

## 2025-02-12 PROCEDURE — 1160F RVW MEDS BY RX/DR IN RCRD: CPT | Mod: CPTII,S$GLB,, | Performed by: STUDENT IN AN ORGANIZED HEALTH CARE EDUCATION/TRAINING PROGRAM

## 2025-02-12 PROCEDURE — 1123F ACP DISCUSS/DSCN MKR DOCD: CPT | Mod: CPTII,S$GLB,, | Performed by: STUDENT IN AN ORGANIZED HEALTH CARE EDUCATION/TRAINING PROGRAM

## 2025-02-12 PROCEDURE — 1125F AMNT PAIN NOTED PAIN PRSNT: CPT | Mod: CPTII,S$GLB,, | Performed by: STUDENT IN AN ORGANIZED HEALTH CARE EDUCATION/TRAINING PROGRAM

## 2025-02-12 PROCEDURE — 99999 PR PBB SHADOW E&M-EST. PATIENT-LVL V: CPT | Mod: PBBFAC,,, | Performed by: NURSE PRACTITIONER

## 2025-02-12 PROCEDURE — 3078F DIAST BP <80 MM HG: CPT | Mod: CPTII,S$GLB,, | Performed by: NURSE PRACTITIONER

## 2025-02-12 PROCEDURE — 3074F SYST BP LT 130 MM HG: CPT | Mod: CPTII,S$GLB,, | Performed by: NURSE PRACTITIONER

## 2025-02-12 PROCEDURE — 99215 OFFICE O/P EST HI 40 MIN: CPT | Mod: S$GLB,,, | Performed by: NURSE PRACTITIONER

## 2025-02-12 PROCEDURE — 1123F ACP DISCUSS/DSCN MKR DOCD: CPT | Mod: CPTII,S$GLB,, | Performed by: NURSE PRACTITIONER

## 2025-02-12 PROCEDURE — 1101F PT FALLS ASSESS-DOCD LE1/YR: CPT | Mod: CPTII,S$GLB,, | Performed by: NURSE PRACTITIONER

## 2025-02-12 PROCEDURE — 1160F RVW MEDS BY RX/DR IN RCRD: CPT | Mod: CPTII,S$GLB,, | Performed by: NURSE PRACTITIONER

## 2025-02-12 PROCEDURE — 99497 ADVNCD CARE PLAN 30 MIN: CPT | Mod: S$GLB,,, | Performed by: NURSE PRACTITIONER

## 2025-02-12 PROCEDURE — 99214 OFFICE O/P EST MOD 30 MIN: CPT | Mod: 25,S$GLB,, | Performed by: STUDENT IN AN ORGANIZED HEALTH CARE EDUCATION/TRAINING PROGRAM

## 2025-02-12 PROCEDURE — 1159F MED LIST DOCD IN RCRD: CPT | Mod: CPTII,S$GLB,, | Performed by: NURSE PRACTITIONER

## 2025-02-12 PROCEDURE — 1159F MED LIST DOCD IN RCRD: CPT | Mod: CPTII,S$GLB,, | Performed by: STUDENT IN AN ORGANIZED HEALTH CARE EDUCATION/TRAINING PROGRAM

## 2025-02-12 PROCEDURE — 99999 PR PBB SHADOW E&M-EST. PATIENT-LVL III: CPT | Mod: PBBFAC,,, | Performed by: STUDENT IN AN ORGANIZED HEALTH CARE EDUCATION/TRAINING PROGRAM

## 2025-02-12 PROCEDURE — 3288F FALL RISK ASSESSMENT DOCD: CPT | Mod: CPTII,S$GLB,, | Performed by: NURSE PRACTITIONER

## 2025-02-12 PROCEDURE — 20611 DRAIN/INJ JOINT/BURSA W/US: CPT | Mod: 50,S$GLB,, | Performed by: STUDENT IN AN ORGANIZED HEALTH CARE EDUCATION/TRAINING PROGRAM

## 2025-02-12 PROCEDURE — 1125F AMNT PAIN NOTED PAIN PRSNT: CPT | Mod: CPTII,S$GLB,, | Performed by: NURSE PRACTITIONER

## 2025-02-12 RX ORDER — TRIAMCINOLONE ACETONIDE 40 MG/ML
40 INJECTION, SUSPENSION INTRA-ARTICULAR; INTRAMUSCULAR
Status: DISCONTINUED | OUTPATIENT
Start: 2025-02-12 | End: 2025-02-12 | Stop reason: HOSPADM

## 2025-02-12 RX ADMIN — TRIAMCINOLONE ACETONIDE 40 MG: 40 INJECTION, SUSPENSION INTRA-ARTICULAR; INTRAMUSCULAR at 02:02

## 2025-02-12 NOTE — PATIENT INSTRUCTIONS
Assessment:  Cassandra Campos is a 74 y.o. female   Chief Complaint   Patient presents with    Right Knee - Pain    Left Knee - Pain    Follow-up       No diagnosis found.     Plan:  Ultrasound guided cortisone injections to bilateral knees  We discussed the proper protocols after the injection such as no submerging pools, baths tubs, or hot tubs for 24 hr.  Showering is okay today.  We also discussed that blood sugars can be elevated after an injection and asked patient to properly checked her sugars over the next few days and contact their PCP if there are any concerns.  We discussed red flags such as fevers, chills, red, warm, tender joint at the area of injection to please seek medical care immediately.    Follow up as needed      Follow-up: as needed.    Thank you for choosing [facility] and Dr. Naman Barton for your orthopedic & sports medicine care. It is our goal to provide you with exceptional care that will help keep you healthy, active, and get you back in the game.    Please do not hesitate to reach out to us via email, phone, or MyChart with any questions, concerns, or feedback.    If you felt that you received exemplary care today, please consider leaving us feedback on [employer] at:  [email or web address]    If you are experiencing pain/discomfort ,or have questions after 5pm and would like to be connected to the [facility] on-call team, please call this number and specify which Sports Medicine provider is treating you: [phone]

## 2025-02-12 NOTE — PATIENT INSTRUCTIONS
Try Tylenol Arthritis strength 650 mg (extended relief acetaminophen) take one twice daily for arthritis pain, okay to take with Tramadol. Okay to add a third tablet each day.

## 2025-02-12 NOTE — ASSESSMENT & PLAN NOTE
Goals of care: Hopeful she will tolerate tx well and gain control of cancer.   Advanced directive: desires DNR. We reviewed [place]; she is considering completing.   HC POA: son/only child Claude Monroy Jr is SDM  Symptoms: no cancer related sx today  Follow up: PRN

## 2025-02-12 NOTE — PROCEDURES
Sports Medicine US - Guidance for Needle Placement    Date/Time: 2/12/2025 2:00 PM    Performed by: Naman Barton MD  Authorized by: Naman Barton MD  Preparation: Patient was prepped and draped in the usual sterile fashion.  Local anesthesia used: no    Anesthesia:  Local anesthesia used: no    Sedation:  Patient sedated: no    Patient tolerance: patient tolerated the procedure well with no immediate complications  Comments: Ultrasound guidance was used for needle localization. Images were saved and stored for documentation. The appropriate structures were visualized. Dynamic visualization of the needle was continuous throughout the procedures and maintained good position.

## 2025-02-12 NOTE — PROGRESS NOTES
Patient ID: Cassandra Campos  YOB: 1950  MRN: [id number]    Chief Complaint: Pain of the Right Knee, Pain of the Left Knee, and Follow-up    History of Present Illness: Cassandra Campos is a 74 y.o. female who presents today with bilateral knee pain, left greater than right.  Patient last seen in clinic on 11/07/2024 and received steroid injections.   Currently rates her pain at a 7/10 and would like to repeat injections today.      11/07/2024 Interval History of Present Illness: Cassandra Campos is a 74 y.o. female who presents today with bilateral knee pain, left greater than right.  Patient last seen in clinic on 8/21/2024 and received Synvisc One injections.  Received a CSI to the right knee on 6/12/2024 and left knee on 6/26/2024.  Received a Toradol injection on 7/18/2024.  Currently rates her pain at a 7/10 and would like to repeat injections today.      7/18/2024 Interval History of Present Illness: Cassandra Campos is a right-hand dominant 73 y.o. female who presents today with bilateral knee pain, right greater than left.  Reports that she bumped her right knee recently and has increased her pain. Last seen in clinic on 6/26/2024 and received CSI to the    Left knee.  Received cSI to the right knee on 6/12/2024.  Currently rates pain at a 10/10 with constant throbbing pain that keeps her up at night.  Patient reports pain when walking and sitting.  Completed PT last week and currently taking Mobic and Tylenol.     6/26/2024 Interval History of Present Illness: Cassandra Campos is a right-hand dominant 73 y.o. female who presents today with left knee and left shoulder pain.   Patient notes pain is at worst a 10/10 at times affecting her activity of daily living and thus her quality of life.  She has not using any devices to assist with ambulation nor is she wearing any knee braces.  She states she takes Tylenol as needed to help with the pain .  She states she has having a hard  time walking long distances, going from a sitting to standing or standing to seated position, unlevel terrain or stairs.  She also complains of left shoulder pain and dysfunction that had started 3-4 weeks . Her symptoms included pain and lack of range of motion. Her pain was aggravated by overhead movement, reaching movements. She had tried rest, activity modification, Muscle relaxer, ibuprofen, heat and ice.     6/12/2024 Interval History of Present Illness: Cassandra Campos is a right-hand dominant 73 y.o. female who presents today with bilateral shoulder pain and bilateral knee pain.  73-year-old female with history of HTN, HLD, and DM  with c/o left shoulder pain.  States pain to left shoulder with touch and movement. States difficulty lifting shoulder above head and mostly keeping her up at night.. States taking OTC NSAIDs with minimal relief. She was seen by  POLO Alan and received left knee CSI on 3/22/24. Received SA steroid injection by Dr. Palma on 2/07/2024.  Pt states she was concerned she is still having pain. MRI left shoulder on file. Patient is currently doing Home health physical therapy.            Past Medical History:   Past Medical History:   Diagnosis Date    Arthritis     hands    Bilateral bunions     Borderline glaucoma     De Quervain's disease (radial styloid tenosynovitis)     Gastritis     upper GI 2/2017    Hydradenitis     Hyperlipidemia     Hypertension     Insomnia     Migraines 02/01/2000    Nasal septum perforation     Obesity     Pneumonia     Restrictive airway disease     Sleep apnea     SVT (supraventricular tachycardia) 09/2013    Trigger finger     Type 2 diabetes mellitus 2012     am 02/01/2024     Past Surgical History:   Procedure Laterality Date    AXILLARY HIDRADENITIS EXCISION Bilateral     BONE EXOSTOSIS EXCISION Right 07/25/2018    Procedure: EXCISION, EXOSTOSIS;  Surgeon: Jayro Pedraza Sr., MD;  Location: [facility];  Service: Orthopedics;  Laterality:  Right;    BREAST BIOPSY Bilateral     both benign    BREAST SURGERY  1998    BRONCHOSCOPY Bilateral 1/15/2025    Procedure: Bronchoscopy - insert lighted tube into airway to take a biopsy of lung;  Surgeon: Adrian Robin MD;  Location: [facility];  Service: Pulmonary;  Laterality: Bilateral;    CARPAL TUNNEL RELEASE      bilateral    CARPAL TUNNEL RELEASE Right 2024    Procedure: RELEASE, CARPAL TUNNEL;  Surgeon: Jaime Oswald MD;  Location: [facility] OR;  Service: Orthopedics;  Laterality: Right;    CARPAL TUNNEL RELEASE Left 2024    Procedure: RELEASE, CARPAL TUNNEL;  Surgeon: Jaime Oswald MD;  Location: [facility] OR;  Service: Orthopedics;  Laterality: Left;    CATARACT EXTRACTION Bilateral     OU     SECTION  1979    CHOLECYSTECTOMY  2014    COLONOSCOPY N/A 10/02/2020    Procedure: COLONOSCOPY;  Surgeon: Tushar Edwards MD;  Location: [facility];  Service: Endoscopy;  Laterality: N/A;    COLONOSCOPY W/ POLYPECTOMY  10/02/2020    Polyps x3, repeat 5 years; Tushar Edwards MD     CYST REMOVAL  2015    sebaceous cyst removed from face    DE QUERVAIN'S RELEASE Left 2020    Procedure: RELEASE, HAND, FOR DEQUERVAIN'S TENOSYNOVITIS;  Surgeon: Jaime Oswald MD;  Location: [facility] OR;  Service: Orthopedics;  Laterality: Left;    DE QUERVAIN'S RELEASE Right 2020    Procedure: RELEASE, HAND, FOR DEQUERVAIN'S TENOSYNOVITIS;  Surgeon: Jaime Oswald MD;  Location: [facility];  Service: Orthopedics;  Laterality: Right;    ENDOBRONCHIAL ULTRASOUND Bilateral 04/10/2024    Procedure: ENDOBRONCHIAL ULTRASOUND (EBUS);  Surgeon: Adrian Robin MD;  Location: [facility];  Service: Pulmonary;  Laterality: Bilateral;    ENDOBRONCHIAL ULTRASOUND Bilateral 1/15/2025    Procedure: ENDOBRONCHIAL ULTRASOUND (EBUS);  Surgeon: Adrian Robin MD;  Location: [facility];  Service: Pulmonary;  Laterality: Bilateral;    EYE SURGERY      gastric sleeve  2017     Dr. Watson    INJECTION OF ANESTHETIC AGENT AROUND MULTIPLE INTERCOSTAL NERVES  5/10/2024    Procedure: BLOCK, NERVE, INTERCOSTAL, 2 OR MORE;  Surgeon: Mike Nuñez MD;  Location: [facility] OR;  Service: Cardiothoracic;;    KNEE SURGERY Right     OLECRANON BURSECTOMY Right 07/25/2018    Procedure: BURSECTOMY, OLECRANON;  Surgeon: Jayro Pedraza Sr., MD;  Location: [facility] OR;  Service: Orthopedics;  Laterality: Right;    SURGICAL REMOVAL OF BUNION WITH OSTEOTOMY OF METATARSAL BONE Left 05/10/2019    Procedure: BUNIONECTOMY, WITH METATARSAL OSTEOTOMY;  Surgeon: Srinivasan Villanueva DPM;  Location: [facility] OR;  Service: Podiatry;  Laterality: Left;    SURGICAL REMOVAL OF BUNION WITH OSTEOTOMY OF METATARSAL BONE Right 06/28/2019    Procedure: BUNIONECTOMY, WITH METATARSAL OSTEOTOMY;  Surgeon: Srinivasan Villanueva DPM;  Location: [facility] OR;  Service: Podiatry;  Laterality: Right;    SURGICAL REMOVAL OF LYMPH NODE Left 5/10/2024    Procedure: EXCISION, LYMPH NODE;  Surgeon: Mike Nuñez MD;  Location: [facility] OR;  Service: Cardiothoracic;  Laterality: Left;    TONSILLECTOMY, ADENOIDECTOMY  1980s    TRANSESOPHAGEAL ECHOCARDIOGRAM WITH POSSIBLE CARDIOVERSION (LAKESHIA W/ POSS CARDIOVERSION) N/A 5/15/2024    Procedure: Transesophageal echo (LAKESHIA) intra-procedure log documentation;  Surgeon: Twan Pelaez MD;  Location: [facility] CATH LAB;  Service: Cardiology;  Laterality: N/A;    TRIGGER FINGER RELEASE Right 04/01/2015    Dr. Pedraza    XI ROBOTIC RATS,WITH LOBECTOMY,LUNG Left 5/10/2024    Procedure: XI ROBOTIC RATS,WITH LOBECTOMY,LUNG;  Surgeon: Mike Nuñez MD;  Location: [facility] OR;  Service: Cardiothoracic;  Laterality: Left;  Lingulectomy     Family History   Problem Relation Name Age of Onset    Prostate cancer Brother      Diabetes Maternal Aunt Alondra Campos     Diabetes Cousin      Hypertension Maternal Grandmother Portia Orosco      Social History     Socioeconomic History    Marital status:     Number of children: 1   Occupational  History    Occupation:  aid   Tobacco Use    Smoking status: Former     Current packs/day: 0.00     Average packs/day: 0.5 packs/day for 42.0 years (21.0 ttl pk-yrs)     Types: Cigarettes     Start date: 1970     Quit date: 2012     Years since quittin.1     Passive exposure: Past    Smokeless tobacco: Never   Substance and Sexual Activity    Alcohol use: Not Currently     Alcohol/week: 1.0 standard drink of alcohol     Types: 1 Glasses of wine per week     Comment: Glass red wine once a every 2 weeks    Drug use: Never    Sexual activity: Not Currently     Partners: Female     Birth control/protection: Abstinence, None   Social History Narrative    Single, part-time teacher. Masters degree biology.      Social Drivers of Health     Financial Resource Strain: Patient Declined (2024)    Overall Financial Resource Strain (CARDIA)     Difficulty of Paying Living Expenses: Patient declined   Food Insecurity: Patient Declined (2024)    Hunger Vital Sign     Worried About Running Out of Food in the Last Year: Patient declined     Ran Out of Food in the Last Year: Patient declined   Transportation Needs: No Transportation Needs (2023)    PRAPARE - Transportation     Lack of Transportation (Medical): No     Lack of Transportation (Non-Medical): No   Physical Activity: Insufficiently Active (2024)    Exercise Vital Sign     Days of Exercise per Week: 3 days     Minutes of Exercise per Session: 20 min   Stress: Stress Concern Present (2024)    [employer] [place] [employer] - Occupational Stress Questionnaire     Feeling of Stress : Very much   Housing Stability: High Risk (2024)    Housing Stability Vital Sign     Unable to Pay for Housing in the Last Year: Yes     Medication List with Changes/Refills   Current Medications    ALBUTEROL (PROVENTIL/VENTOLIN HFA) 90 MCG/ACTUATION INHALER    INHALE 2 PUFFS INTO THE LUNGS EVERY 4 HOURS AS NEEDED FOR  WHEEZING OR SHORTNESS OF BREATH.    AMITRIPTYLINE (ELAVIL) 50 MG TABLET    Take 1 tablet (50 mg total) by mouth every evening.    ASPIRIN (ECOTRIN) 81 MG EC TABLET    Take 1 tablet (81 mg total) by mouth once daily.    BLOOD SUGAR DIAGNOSTIC STRP    use to test blood sugar 1-2 times a day    BLOOD-GLUCOSE METER (ACCU-CHEK GUIDE ME GLUCOSE MTR) MISC    use to check blood glucose 2 times a day    BLOOD-GLUCOSE METER (ACCU-CHEK GUIDE ME GLUCOSE MTR) MISC    To check blood glucose 1-2 times daily, to use with insurance preferred meter    ESZOPICLONE (LUNESTA) 3 MG TAB    Take 1 tablet (3 mg total) by mouth every evening.    LANCETS MISC    Use to test blood glucose 1-2 times a day, discard lancet after each use    LORAZEPAM (ATIVAN) 1 MG TABLET    Take 1 tablet (1 mg total) by mouth 2 (two) times daily.    LOSARTAN (COZAAR) 25 MG TABLET    Take 1 tablet (25 mg total) by mouth once daily.    METOPROLOL SUCCINATE (TOPROL-XL) 50 MG 24 HR TABLET    Take 1 tablet (50 mg total) by mouth once daily.    OMEPRAZOLE (PRILOSEC) 20 MG CAPSULE    Take 1 capsule (20 mg total) by mouth once daily.    SEMAGLUTIDE (OZEMPIC) 2 MG/DOSE (8 MG/3 ML) PNIJ    Inject 2 mg into the skin every 7 days.    TRAMADOL (ULTRAM) 50 MG TABLET    Take 1 tablet (50 mg total) by mouth 2 (two) times a day.    TRAMADOL (ULTRAM) 50 MG TABLET    Take 1 tablet (50 mg total) by mouth every 12 (twelve) hours as needed for Pain. Greater than 7 day supply medically necessary.    TRIAMCINOLONE ACETONIDE 0.025 % LOTN    Apply small amount to affective areas twice a day  take cool showers or baths     Review of patient's allergies indicates:  No Known Allergies    Physical Exam:   Body mass index is 25.81 kg/m².    GENERAL: Well appearing, in no acute distress.  HEAD: Normocephalic and atraumatic.  ENT: External ears and nose grossly normal.  EYES: EOMI bilaterally  PULMONARY: Respirations are grossly even and non-labored.  NEURO: Awake, alert, and oriented x  3.  SKIN: No obvious rashes appreciated.  PSYCH: Mood & affect are appropriate.    Detailed MSK exam:     Left knee exam:   -ROM: extension 0, flexion 120  -TTP: Medial joint line and Lateral joint line  -effusion: none  -Patellar apprehension negative  -Thor test negative  -stable to varus and valgus stress tests  -Lachman test negative, anterior drawer test negative, posterior drawer test negative    Right knee exam:   -ROM: extension 0, flexion 120  -TTP: Medial joint line and Lateral joint line  -effusion: none  -Patellar apprehension negative  -Thor test negative  -stable to varus and valgus stress tests  -Lachman test negative, anterior drawer test negative, posterior drawer test negative      Imaging:  MRI Brain W WO Contrast  Narrative: EXAMINATION:  MRI BRAIN W WO CONTRAST    CLINICAL HISTORY:  Malignant neoplasm of unspecified part of left bronchus or lungBrain metastases suspected;    TECHNIQUE:  Standard multiplanar noncontrast and contrast enhanced sequences of the brain performed with 6cc Gadavist.    COMPARISON:  07/27/2024 CT scan    FINDINGS:  Intracranial compartment:    Ventricles and sulci are mildly enlarged in size for age without evidence of hydrocephalus. No extra-axial blood or fluid collections.    There is moderate white matter hyperintensity most consistent with age-related degeneration.  No mass lesion, acute hemorrhage, edema or acute infarct. The diffusion-weighted sequence is negative.  No evidence of acute infarct.    No abnormal enhancement.    Normal vascular flow voids are preserved.    Skull/extracranial contents (limited evaluation): Bone marrow signal intensity is normal.  Impression: No evidence of intracranial metastatic disease.    Electronically signed by: Jaime Gomes MD  Date:    01/09/2025  Time:    08:06        Relevant imaging results were reviewed and interpreted by me and per my read shows mild arthritic changes bilaterally.  This was discussed with the  patient and / or family today.     Assessment:  Cassandra Campos is a 74 y.o. female following up for bilateral knee pain. Interested in repeating steroid injections today. Steroid injections have continued to work well for her.   Plan: Steroid injection given today (see separate procedure note for details). We discussed the proper protocols after the injection such as no submerging pools, baths tubs, or hot tubs for 24 hr.  Showering is okay today.  We also discussed that blood sugars can be elevated after an injection and asked patient to properly checked her sugars over the next few days and contact their PCP if there are any concerns.  We discussed red flags such as fevers, chills, red, warm, tender joint at the area of injection to please seek medical care immediately.   Continue conservative management for pain.   Follow up as needed. All questions answered.     Primary osteoarthritis of both knees  -     Sports Medicine US - Guidance for Needle Placement  -     Large Joint Aspiration/Injection: bilateral knee           Ultrasound guidance was used for needle localization. Images were saved and stored for documentation. The appropriate structures were visualized. Dynamic visualization of the needle was continuous throughout the procedures and maintained good position.      MEDICAL NECESSITY FOR VISCOSUPPLEMENTATION: After thorough evaluation of the patient, I have determined that visco-supplementation is medically necessary. The patient has painful degenerative changes of the knee with failure of conservative treatments including lifestyle modifications and rehabilitation exercises.  Oral analgesis/NSAIDs have not adequately controlled symptoms and there is radiographic evidence of Kellgren Sanchez grade 2 or greater osteoarthritic changes, or in lack of radiographic evidence, there is arthroscopic or other evidence of chondrosis.     I spent a total of 30 minutes on the day of the visit.  This includes  face to face time and non-face to face time preparing to see the patient (eg, review of tests), obtaining and/or reviewing separately obtained history, documenting clinical information in the electronic or other health record, independently interpreting results and communicating results to the patient/family/caregiver, or care coordinator.      Electronically signed:  Naman Barton MD, MPH  02/12/2025  11:31 AM

## 2025-02-12 NOTE — PROCEDURES
Large Joint Aspiration/Injection: bilateral knee    Date/Time: 2/12/2025 2:00 PM    Performed by: Naman Barton MD  Authorized by: Naman Barton MD    Consent Done?:  Yes (Verbal)  Indications:  Arthritis and pain  Site marked: the procedure site was marked    Timeout: prior to procedure the correct patient, procedure, and site was verified    Prep: patient was prepped and draped in usual sterile fashion    Local anesthetic:  Bupivacaine 0.5% without epinephrine and lidocaine 1% without epinephrine    Details:  Needle Size:  21 G  Ultrasonic Guidance for needle placement?: Yes    Images are saved and documented.  Approach:  Lateral (superior)  Location:  Knee  Laterality:  Bilateral  Site:  Bilateral knee  Medications (Right):  40 mg triamcinolone acetonide 40 mg/mL  Medications (Left):  40 mg triamcinolone acetonide 40 mg/mL  Patient tolerance:  Patient tolerated the procedure well with no immediate complications     Ultrasound guidance was used for needle localization. Images were saved and stored for documentation. The appropriate structures were visualized. Dynamic visualization of the needle was continuous throughout the procedures and maintained good position.

## 2025-02-19 ENCOUNTER — OFFICE VISIT (OUTPATIENT)
Dept: HEMATOLOGY/ONCOLOGY | Facility: CLINIC | Age: 75
End: 2025-02-19
Payer: MEDICARE

## 2025-02-19 ENCOUNTER — LAB VISIT (OUTPATIENT)
Dept: LAB | Facility: HOSPITAL | Age: 75
End: 2025-02-19
Attending: INTERNAL MEDICINE
Payer: MEDICARE

## 2025-02-19 VITALS
WEIGHT: 147.5 LBS | TEMPERATURE: 98 F | BODY MASS INDEX: 26.13 KG/M2 | HEART RATE: 94 BPM | OXYGEN SATURATION: 97 % | HEIGHT: 63 IN | SYSTOLIC BLOOD PRESSURE: 122 MMHG | DIASTOLIC BLOOD PRESSURE: 76 MMHG

## 2025-02-19 DIAGNOSIS — Z79.899 IMMUNODEFICIENCY DUE TO CHEMOTHERAPY: ICD-10-CM

## 2025-02-19 DIAGNOSIS — C34.32 MALIGNANT NEOPLASM OF LOWER LOBE OF LEFT LUNG: ICD-10-CM

## 2025-02-19 DIAGNOSIS — C34.92 ADENOCARCINOMA OF LEFT LUNG: ICD-10-CM

## 2025-02-19 DIAGNOSIS — T45.1X5A IMMUNODEFICIENCY DUE TO CHEMOTHERAPY: ICD-10-CM

## 2025-02-19 DIAGNOSIS — R94.6 ABNORMAL RESULTS OF THYROID FUNCTION STUDIES: ICD-10-CM
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
## 2025-02-19 DIAGNOSIS — C34.32 MALIGNANT NEOPLASM OF LOWER LOBE OF LEFT LUNG: Primary | ICD-10-CM

## 2025-02-19 DIAGNOSIS — D84.821 IMMUNODEFICIENCY DUE TO CHEMOTHERAPY: ICD-10-CM

## 2025-02-19 DIAGNOSIS — D84.822 IMMUNODEFICIENCY SECONDARY TO RADIATION THERAPY: ICD-10-CM

## 2025-02-19 LAB
ALBUMIN SERPL BCP-MCNC: 3 G/DL (ref 3.5–5.2)
ALP SERPL-CCNC: 80 U/L (ref 40–150)
ALT SERPL W/O P-5'-P-CCNC: 134 U/L (ref 10–44)
ANION GAP SERPL CALC-SCNC: 6 MMOL/L (ref 8–16)
AST SERPL-CCNC: 44 U/L (ref 10–40)
BASOPHILS # BLD AUTO: 0.01 K/UL (ref 0–0.2)
BASOPHILS NFR BLD: 0.1 % (ref 0–1.9)
BILIRUB SERPL-MCNC: 0.3 MG/DL (ref 0.1–1)
BUN SERPL-MCNC: 14 MG/DL (ref 8–23)
CALCIUM SERPL-MCNC: 9.2 MG/DL (ref 8.7–10.5)
CHLORIDE SERPL-SCNC: 104 MMOL/L (ref 95–110)
CO2 SERPL-SCNC: 30 MMOL/L (ref 23–29)
CREAT SERPL-MCNC: 0.7 MG/DL (ref 0.5–1.4)
DIFFERENTIAL METHOD BLD: ABNORMAL
EOSINOPHIL # BLD AUTO: 0.1 K/UL (ref 0–0.5)
EOSINOPHIL NFR BLD: 1.1 % (ref 0–8)
ERYTHROCYTE [DISTWIDTH] IN BLOOD BY AUTOMATED COUNT: 17.1 % (ref 11.5–14.5)
EST. GFR  (NO RACE VARIABLE): >60 ML/MIN/1.73 M^2
GLUCOSE SERPL-MCNC: 77 MG/DL (ref 70–110)
HCT VFR BLD AUTO: 39.4 % (ref 37–48.5)
HGB BLD-MCNC: 13.1 G/DL (ref 12–16)
IMM GRANULOCYTES # BLD AUTO: 0.03 K/UL (ref 0–0.04)
IMM GRANULOCYTES NFR BLD AUTO: 0.4 % (ref 0–0.5)
LYMPHOCYTES # BLD AUTO: 2.9 K/UL (ref 1–4.8)
LYMPHOCYTES NFR BLD: 40.8 % (ref 18–48)
MAGNESIUM SERPL-MCNC: 1.9 MG/DL (ref 1.6–2.6)
MCH RBC QN AUTO: 24.8 PG (ref 27–31)
MCHC RBC AUTO-ENTMCNC: 33.2 G/DL (ref 32–36)
MCV RBC AUTO: 75 FL (ref 82–98)
MONOCYTES # BLD AUTO: 0.6 K/UL (ref 0.3–1)
MONOCYTES NFR BLD: 7.9 % (ref 4–15)
NEUTROPHILS # BLD AUTO: 3.6 K/UL (ref 1.8–7.7)
NEUTROPHILS NFR BLD: 49.7 % (ref 38–73)
NRBC BLD-RTO: 0 /100 WBC
PLATELET # BLD AUTO: 316 K/UL (ref 150–450)
PMV BLD AUTO: 9.1 FL (ref 9.2–12.9)
POTASSIUM SERPL-SCNC: 3.5 MMOL/L (ref 3.5–5.1)
PROT SERPL-MCNC: 7.2 G/DL (ref 6–8.4)
RBC # BLD AUTO: 5.28 M/UL (ref 4–5.4)
SODIUM SERPL-SCNC: 140 MMOL/L (ref 136–145)
WBC # BLD AUTO: 7.19 K/UL (ref 3.9–12.7)

## 2025-02-19 PROCEDURE — 80053 COMPREHEN METABOLIC PANEL: CPT | Performed by: INTERNAL MEDICINE

## 2025-02-19 PROCEDURE — 85025 COMPLETE CBC W/AUTO DIFF WBC: CPT | Performed by: INTERNAL MEDICINE

## 2025-02-19 PROCEDURE — 83735 ASSAY OF MAGNESIUM: CPT | Performed by: INTERNAL MEDICINE

## 2025-02-19 RX ORDER — PROCHLORPERAZINE EDISYLATE 5 MG/ML
5 INJECTION INTRAMUSCULAR; INTRAVENOUS ONCE AS NEEDED
Status: CANCELLED | OUTPATIENT
Start: 2025-02-20

## 2025-02-19 RX ORDER — FAMOTIDINE 10 MG/ML
20 INJECTION INTRAVENOUS
Status: CANCELLED | OUTPATIENT
Start: 2025-02-20

## 2025-02-19 RX ORDER — EPINEPHRINE 0.3 MG/.3ML
0.3 INJECTION SUBCUTANEOUS ONCE AS NEEDED
Status: CANCELLED | OUTPATIENT
Start: 2025-02-20

## 2025-02-19 RX ORDER — OLANZAPINE 5 MG/1
5 TABLET ORAL NIGHTLY
Qty: 12 TABLET | Refills: 1 | Status: SHIPPED | OUTPATIENT
Start: 2025-02-19 | End: 2025-02-19 | Stop reason: SDUPTHER

## 2025-02-19 RX ORDER — SODIUM CHLORIDE 0.9 % (FLUSH) 0.9 %
10 SYRINGE (ML) INJECTION
Status: CANCELLED | OUTPATIENT
Start: 2025-02-20

## 2025-02-19 RX ORDER — DIPHENHYDRAMINE HYDROCHLORIDE 50 MG/ML
50 INJECTION INTRAMUSCULAR; INTRAVENOUS ONCE AS NEEDED
Status: CANCELLED | OUTPATIENT
Start: 2025-02-20

## 2025-02-19 RX ORDER — PROCHLORPERAZINE MALEATE 5 MG
5 TABLET ORAL EVERY 6 HOURS PRN
Qty: 20 TABLET | Refills: 5 | Status: SHIPPED | OUTPATIENT
Start: 2025-02-19 | End: 2025-02-19 | Stop reason: SDUPTHER

## 2025-02-19 RX ORDER — HEPARIN 100 UNIT/ML
500 SYRINGE INTRAVENOUS
Status: CANCELLED | OUTPATIENT
Start: 2025-02-20

## 2025-02-19 RX ORDER — PROCHLORPERAZINE MALEATE 5 MG
5 TABLET ORAL EVERY 6 HOURS PRN
Qty: 20 TABLET | Refills: 5 | Status: SHIPPED | OUTPATIENT
Start: 2025-02-19

## 2025-02-19 RX ORDER — OLANZAPINE 5 MG/1
5 TABLET ORAL NIGHTLY
Qty: 12 TABLET | Refills: 1 | Status: SHIPPED | OUTPATIENT
Start: 2025-02-19

## 2025-02-19 NOTE — PROGRESS NOTES
Subjective:       Patient ID: Cassandra Campos is a 74 y.o. female.    Chief Complaint: Results, Chemotherapy, and Lung Cancer    HPI:  74-year-old female presents with locally advanced non-small cell lung carcinoma to begin treatment with carboplatin Taxol with concurrent radiation.  Patient is here for review if pretreatment and cycle 1 day 1 ECOG status 1    Past Medical History:   Diagnosis Date    Arthritis     hands    Bilateral bunions     Borderline glaucoma     De Quervain's disease (radial styloid tenosynovitis)     Gastritis     upper GI 2/2017    Hydradenitis     Hyperlipidemia     Hypertension     Insomnia     Migraines 02/01/2000    Nasal septum perforation     Obesity     Pneumonia     Restrictive airway disease     Sleep apnea     SVT (supraventricular tachycardia) 09/2013    Trigger finger     Type 2 diabetes mellitus 2012     am 02/01/2024     Family History   Problem Relation Name Age of Onset    Prostate cancer Brother      Diabetes Maternal Aunt Alondra Campos     Diabetes Cousin      Hypertension Maternal Grandmother Portia Orosco      Social History[1]  Past Surgical History:   Procedure Laterality Date    AXILLARY HIDRADENITIS EXCISION Bilateral     BONE EXOSTOSIS EXCISION Right 07/25/2018    Procedure: EXCISION, EXOSTOSIS;  Surgeon: Jayro Pedraza Sr., MD;  Location: [facility] OR;  Service: Orthopedics;  Laterality: Right;    BREAST BIOPSY Bilateral     both benign    BREAST SURGERY  07/1998    BRONCHOSCOPY Bilateral 1/15/2025    Procedure: Bronchoscopy - insert lighted tube into airway to take a biopsy of lung;  Surgeon: Adrian Robin MD;  Location: [facility];  Service: Pulmonary;  Laterality: Bilateral;    CARPAL TUNNEL RELEASE      bilateral    CARPAL TUNNEL RELEASE Right 02/09/2024    Procedure: RELEASE, CARPAL TUNNEL;  Surgeon: Jaime Oswald MD;  Location: [facility] OR;  Service: Orthopedics;  Laterality: Right;    CARPAL TUNNEL RELEASE Left 03/08/2024    Procedure: RELEASE,  CARPAL TUNNEL;  Surgeon: Jaime Oswald MD;  Location: [facility];  Service: Orthopedics;  Laterality: Left;    CATARACT EXTRACTION Bilateral     OU     SECTION  1979    CHOLECYSTECTOMY  2014    COLONOSCOPY N/A 10/02/2020    Procedure: COLONOSCOPY;  Surgeon: Tushar Edwards MD;  Location: [facility];  Service: Endoscopy;  Laterality: N/A;    COLONOSCOPY W/ POLYPECTOMY  10/02/2020    Polyps x3, repeat 5 years; Tushar Edwards MD     CYST REMOVAL  2015    sebaceous cyst removed from face    DE QUERVAIN'S RELEASE Left 2020    Procedure: RELEASE, HAND, FOR DEQUERVAIN'S TENOSYNOVITIS;  Surgeon: Jaime Oswald MD;  Location: [facility] OR;  Service: Orthopedics;  Laterality: Left;    DE QUERVAIN'S RELEASE Right 2020    Procedure: RELEASE, HAND, FOR DEQUERVAIN'S TENOSYNOVITIS;  Surgeon: Jaime Oswald MD;  Location: [facility];  Service: Orthopedics;  Laterality: Right;    ENDOBRONCHIAL ULTRASOUND Bilateral 04/10/2024    Procedure: ENDOBRONCHIAL ULTRASOUND (EBUS);  Surgeon: Adrian Robin MD;  Location: [facility];  Service: Pulmonary;  Laterality: Bilateral;    ENDOBRONCHIAL ULTRASOUND Bilateral 1/15/2025    Procedure: ENDOBRONCHIAL ULTRASOUND (EBUS);  Surgeon: Adrian Robin MD;  Location: [facility];  Service: Pulmonary;  Laterality: Bilateral;    EYE SURGERY      gastric sleeve  2017    Dr. Watson    INJECTION OF ANESTHETIC AGENT AROUND MULTIPLE INTERCOSTAL NERVES  5/10/2024    Procedure: BLOCK, NERVE, INTERCOSTAL, 2 OR MORE;  Surgeon: Mike Nuñez MD;  Location: [facility];  Service: Cardiothoracic;;    KNEE SURGERY Right     OLECRANON BURSECTOMY Right 2018    Procedure: BURSECTOMY, OLECRANON;  Surgeon: Jayro Pedraza Sr., MD;  Location: [facility];  Service: Orthopedics;  Laterality: Right;    SURGICAL REMOVAL OF BUNION WITH OSTEOTOMY OF METATARSAL BONE Left 05/10/2019    Procedure: BUNIONECTOMY, WITH METATARSAL OSTEOTOMY;  Surgeon: Srinivasan Villanueva DPM;   Location: [facility] OR;  Service: Podiatry;  Laterality: Left;    SURGICAL REMOVAL OF BUNION WITH OSTEOTOMY OF METATARSAL BONE Right 06/28/2019    Procedure: BUNIONECTOMY, WITH METATARSAL OSTEOTOMY;  Surgeon: Srinivasan Villanueva DPM;  Location: [facility] OR;  Service: Podiatry;  Laterality: Right;    SURGICAL REMOVAL OF LYMPH NODE Left 5/10/2024    Procedure: EXCISION, LYMPH NODE;  Surgeon: Mike Nuñez MD;  Location: [facility] OR;  Service: Cardiothoracic;  Laterality: Left;    TONSILLECTOMY, ADENOIDECTOMY  1980s    TRANSESOPHAGEAL ECHOCARDIOGRAM WITH POSSIBLE CARDIOVERSION (LAKESHIA W/ POSS CARDIOVERSION) N/A 5/15/2024    Procedure: Transesophageal echo (LAKESHIA) intra-procedure log documentation;  Surgeon: Twan Pelaez MD;  Location: [facility] CATH LAB;  Service: Cardiology;  Laterality: N/A;    TRIGGER FINGER RELEASE Right 04/01/2015    Dr. Milo HALL ROBOTIC RATS,WITH LOBECTOMY,LUNG Left 5/10/2024    Procedure: XI ROBOTIC RATS,WITH LOBECTOMY,LUNG;  Surgeon: Mike Nuñez MD;  Location: [facility] OR;  Service: Cardiothoracic;  Laterality: Left;  Lingulectomy       Labs:  Lab Results   Component Value Date    WBC 7.19 02/19/2025    HGB 13.1 02/19/2025    HCT 39.4 02/19/2025    MCV 75 (L) 02/19/2025     02/19/2025     BMP  Lab Results   Component Value Date     02/19/2025    K 3.5 02/19/2025     02/19/2025    CO2 30 (H) 02/19/2025    BUN 14 02/19/2025    CREATININE 0.7 02/19/2025    CALCIUM 9.2 02/19/2025    ANIONGAP 6 (L) 02/19/2025    ESTGFRAFRICA >60 06/21/2022    EGFRNONAA >60 06/21/2022     Lab Results   Component Value Date     (H) 02/19/2025    AST 44 (H) 02/19/2025    ALKPHOS 80 02/19/2025    BILITOT 0.3 02/19/2025       Lab Results   Component Value Date    IRON 112 09/12/2022    TIBC 448 11/28/2016    FERRITIN 35 09/12/2022     Lab Results   Component Value Date    RNYVQDDY47 1440 (H) 08/14/2018     Lab Results   Component Value Date    FOLATE 15.5 11/28/2016     Lab Results   Component Value Date    TSH  4.816 (H) 01/31/2025         Review of Systems   Constitutional:  Negative for activity change, appetite change, chills, diaphoresis, fatigue, fever and unexpected weight change.   HENT:  Negative for congestion, dental problem, drooling, ear discharge, ear pain, facial swelling, hearing loss, mouth sores, nosebleeds, postnasal drip, rhinorrhea, sinus pressure, sneezing, sore throat, tinnitus, trouble swallowing and voice change.    Eyes:  Negative for photophobia, pain, discharge, redness, itching and visual disturbance.   Respiratory:  Negative for cough, choking, chest tightness, shortness of breath, wheezing and stridor.    Cardiovascular:  Negative for chest pain, palpitations and leg swelling.   Gastrointestinal:  Negative for abdominal distention, abdominal pain, anal bleeding, blood in stool, constipation, diarrhea, nausea, rectal pain and vomiting.   Endocrine: Negative for cold intolerance, heat intolerance, polydipsia, polyphagia and polyuria.   Genitourinary:  Negative for decreased urine volume, difficulty urinating, dyspareunia, dysuria, enuresis, flank pain, frequency, genital sores, hematuria, menstrual problem, pelvic pain, urgency, vaginal bleeding, vaginal discharge and vaginal pain.   Musculoskeletal:  Negative for arthralgias, back pain, gait problem, joint swelling, myalgias, neck pain and neck stiffness.   Skin:  Negative for color change, pallor and rash.   Allergic/Immunologic: Negative for environmental allergies, food allergies and immunocompromised state.   Neurological:  Negative for dizziness, tremors, seizures, syncope, facial asymmetry, speech difficulty, weakness, light-headedness, numbness and headaches.   Hematological:  Negative for adenopathy. Does not bruise/bleed easily.   Psychiatric/Behavioral:  Negative for agitation, behavioral problems, confusion, decreased concentration, dysphoric mood, hallucinations, self-injury, sleep disturbance and suicidal ideas. The patient is not  nervous/anxious and is not hyperactive.        Objective:      Physical Exam  Vitals reviewed.   Constitutional:       General: She is not in acute distress.     Appearance: She is well-developed. She is not diaphoretic.   HENT:      Head: Normocephalic and atraumatic.      Right Ear: External ear normal.      Left Ear: External ear normal.      Nose: Nose normal.      Right Sinus: No maxillary sinus tenderness or frontal sinus tenderness.      Left Sinus: No maxillary sinus tenderness or frontal sinus tenderness.      Mouth/Throat:      Pharynx: No oropharyngeal exudate.   Eyes:      General: Lids are normal. No scleral icterus.        Right eye: No discharge.         Left eye: No discharge.      Conjunctiva/sclera: Conjunctivae normal.      Right eye: Right conjunctiva is not injected. No hemorrhage.     Left eye: Left conjunctiva is not injected. No hemorrhage.     Pupils: Pupils are equal, round, and reactive to light.   Neck:      Thyroid: No thyromegaly.      Vascular: No JVD.      Trachea: No tracheal deviation.   Cardiovascular:      Rate and Rhythm: Normal rate.   Pulmonary:      Effort: Pulmonary effort is normal. No respiratory distress.      Breath sounds: No stridor.   Chest:      Chest wall: No tenderness.   Abdominal:      General: Bowel sounds are normal. There is no distension.      Palpations: Abdomen is soft. There is no hepatomegaly, splenomegaly or mass.      Tenderness: There is no abdominal tenderness. There is no rebound.   Musculoskeletal:         General: No tenderness. Normal range of motion.      Cervical back: Normal range of motion and neck supple.   Lymphadenopathy:      Cervical: No cervical adenopathy.      Upper Body:      Right upper body: No supraclavicular adenopathy.      Left upper body: No supraclavicular adenopathy.   Skin:     General: Skin is dry.      Findings: No erythema or rash.   Neurological:      Mental Status: She is alert and oriented to person, place, and time.       Cranial Nerves: No cranial nerve deficit.      Coordination: Coordination normal.   Psychiatric:         Behavior: Behavior normal.         Thought Content: Thought content normal.         Judgment: Judgment normal.             Assessment:      1. Malignant neoplasm of lower lobe of left lung    2. Immunodeficiency due to chemotherapy    3. Immunodeficiency secondary to radiation therapy           Med Onc Chart Routing      Follow up with physician . Return one-week CBC CMP can be seen by either MD are APAP alternate   Follow up with [facility]    Infusion scheduling note    Injection scheduling note Carboplatin Taxol weekly with concurrent radiation   Labs    Imaging    Pharmacy appointment    Other referrals                   Plan:     Planned start treatment with carboplatin Taxol external beam radiation therapy case reviewed with patient placed through via Oncology pathways.  Consent for chemotherapy signedConsented the patient to the treatment plan and the patient was educated on the planned duration of the treatment and schedule of the treatment administration.  Advanced care planning referral to palliative care madeAdvance Care Planning return to clinic in one-week with CBC CMP alternate MD RAY Moran Jr, MD FACP         [1]   Social History  Socioeconomic History    Marital status:     Number of children: 1   Occupational History    Occupation:  aid   Tobacco Use    Smoking status: Former     Current packs/day: 0.00     Average packs/day: 0.5 packs/day for 42.0 years (21.0 ttl pk-yrs)     Types: Cigarettes     Start date: 1970     Quit date: 2012     Years since quittin.1     Passive exposure: Past    Smokeless tobacco: Never   Substance and Sexual Activity    Alcohol use: Not Currently     Alcohol/week: 1.0 standard drink of alcohol     Types: 1 Glasses of wine per week     Comment: Glass red wine once a every 2 weeks    Drug use: Never    Sexual  activity: Not Currently     Partners: Female     Birth control/protection: Abstinence, None   Social History Narrative    Single, part-time teacher. Masters degree biology.      Social Drivers of Health     Financial Resource Strain: Patient Declined (12/19/2024)    Overall Financial Resource Strain (CARDIA)     Difficulty of Paying Living Expenses: Patient declined   Food Insecurity: Patient Declined (12/19/2024)    Hunger Vital Sign     Worried About Running Out of Food in the Last Year: Patient declined     Ran Out of Food in the Last Year: Patient declined   Transportation Needs: No Transportation Needs (12/19/2023)    PRAPARE - Transportation     Lack of Transportation (Medical): No     Lack of Transportation (Non-Medical): No   Physical Activity: Insufficiently Active (12/19/2024)    Exercise Vital Sign     Days of Exercise per Week: 3 days     Minutes of Exercise per Session: 20 min   Stress: Stress Concern Present (12/19/2024)    [employer] - Occupational Stress Questionnaire     Feeling of Stress : Very much   Housing Stability: High Risk (12/19/2024)    Housing Stability Vital Sign     Unable to Pay for Housing in the Last Year: Yes

## 2025-02-20 ENCOUNTER — INFUSION (OUTPATIENT)
Dept: INFUSION THERAPY | Facility: HOSPITAL | Age: 75
End: 2025-02-20
Attending: INTERNAL MEDICINE
Payer: MEDICARE

## 2025-02-20 ENCOUNTER — DOCUMENTATION ONLY (OUTPATIENT)
Dept: HEMATOLOGY/ONCOLOGY | Facility: CLINIC | Age: 75
End: 2025-02-20
Payer: MEDICARE

## 2025-02-20 ENCOUNTER — DOCUMENTATION ONLY (OUTPATIENT)
Dept: RADIATION ONCOLOGY | Facility: CLINIC | Age: 75
End: 2025-02-20
Payer: MEDICARE

## 2025-02-20 ENCOUNTER — DOCUMENTATION ONLY (OUTPATIENT)
Dept: NUTRITION | Facility: CLINIC | Age: 75
End: 2025-02-20
Payer: MEDICARE

## 2025-02-20 VITALS
BODY MASS INDEX: 26.13 KG/M2 | DIASTOLIC BLOOD PRESSURE: 69 MMHG | OXYGEN SATURATION: 95 % | SYSTOLIC BLOOD PRESSURE: 113 MMHG | WEIGHT: 147.5 LBS | TEMPERATURE: 98 F | HEIGHT: 63 IN | HEART RATE: 78 BPM | RESPIRATION RATE: 16 BRPM

## 2025-02-20 DIAGNOSIS — C34.32 MALIGNANT NEOPLASM OF LOWER LOBE OF LEFT LUNG: Primary | ICD-10-CM

## 2025-02-20 PROCEDURE — 96367 TX/PROPH/DG ADDL SEQ IV INF: CPT

## 2025-02-20 PROCEDURE — 25000003 PHARM REV CODE 250: Performed by: INTERNAL MEDICINE

## 2025-02-20 PROCEDURE — 96375 TX/PRO/DX INJ NEW DRUG ADDON: CPT

## 2025-02-20 PROCEDURE — 96417 CHEMO IV INFUS EACH ADDL SEQ: CPT

## 2025-02-20 PROCEDURE — 96413 CHEMO IV INFUSION 1 HR: CPT

## 2025-02-20 PROCEDURE — 63600175 PHARM REV CODE 636 W HCPCS: Performed by: INTERNAL MEDICINE

## 2025-02-20 RX ORDER — FAMOTIDINE 10 MG/ML
20 INJECTION INTRAVENOUS
Status: COMPLETED | OUTPATIENT
Start: 2025-02-20 | End: 2025-02-20

## 2025-02-20 RX ORDER — EPINEPHRINE 0.3 MG/.3ML
0.3 INJECTION SUBCUTANEOUS ONCE AS NEEDED
Status: DISCONTINUED | OUTPATIENT
Start: 2025-02-20 | End: 2025-02-20 | Stop reason: HOSPADM

## 2025-02-20 RX ORDER — DIPHENHYDRAMINE HYDROCHLORIDE 50 MG/ML
50 INJECTION INTRAMUSCULAR; INTRAVENOUS ONCE AS NEEDED
Status: DISCONTINUED | OUTPATIENT
Start: 2025-02-20 | End: 2025-02-20 | Stop reason: HOSPADM

## 2025-02-20 RX ADMIN — PACLITAXEL 78 MG: 6 INJECTION, SOLUTION INTRAVENOUS at 10:02

## 2025-02-20 RX ADMIN — SODIUM CHLORIDE 0.25 MG: 9 INJECTION, SOLUTION INTRAVENOUS at 09:02

## 2025-02-20 RX ADMIN — SODIUM CHLORIDE 50 MG: 9 INJECTION, SOLUTION INTRAVENOUS at 09:02

## 2025-02-20 RX ADMIN — FAMOTIDINE 20 MG: 10 INJECTION, SOLUTION INTRAVENOUS at 09:02

## 2025-02-20 RX ADMIN — CARBOPLATIN 195 MG: 10 INJECTION, SOLUTION INTRAVENOUS at 11:02

## 2025-02-20 NOTE — PROGRESS NOTES
Met with patient to discuss upcoming systemic therapy initiation.  Discussed patient diagnosis including staging information. Also discussed that therapy regimen prescribed involves the following drugs: Taxol and Carboplatin and timeline of therapy administration. Went over what to expect on first day of treatment, including what to bring with you, visitor policy, and infusion suite guidelines. Also discussed supportive and shared services available to patient, including social work, financial counseling, oncology nutrition, and oncology psychology.      Covered with patient potential side effects and symptom management. Reviewed supportive medications that will be given before, during, and after treatment. Also stressed that other side effects are possible outside of those listed. Spent additional time on signs of infection, infection prevention, and proper nutrition/hydration.    Education provided to patient  via [facility] specific drug information and chemotherapy education binder.  Also provided contact information for clinic and information related to [facility] communication. Discussed communication process for after-hours needs and some common scenarios in which patient should call provider for guidance vs. immediately report to the emergency room.     Finally, patient was given my contact information. Encouraged patient to call with any questions, concerns, or needs. Patient verbalized understanding of all above information.   
28.3

## 2025-02-20 NOTE — NURSING
Pt tolerated Carboplatin/Taxol C1D1 well. No adverse reaction noted. Pt education reinforced on possible side effects, what to expect, and when to call Dr.__. Pt verbalized understanding. I reviewed pt calendar w/ pt and understanding verbalized. IV flushed w/ NS and D/C per protocol.

## 2025-02-20 NOTE — PLAN OF CARE
Problem: Adult Inpatient Plan of Care  Goal: Plan of Care Review  Outcome: Progressing  Flowsheets (Taken 2/20/2025 0915)  Plan of Care Reviewed With: patient  Goal: Patient-Specific Goal (Individualized)  Outcome: Progressing  Flowsheets (Taken 2/20/2025 0915)  Individualized Care Needs: recline with pillow and blanket, likes trail mix and apple juice  Anxieties, Fears or Concerns: none today  Patient/Family-Specific Goals (Include Timeframe): tolerate first chemo tx tdoay  Goal: Absence of Hospital-Acquired Illness or Injury  Outcome: Progressing  Intervention: Prevent Infection  Flowsheets (Taken 2/20/2025 0915)  Infection Prevention:   equipment surfaces disinfected   hand hygiene promoted   personal protective equipment utilized   rest/sleep promoted  Goal: Optimal Comfort and Wellbeing  Outcome: Progressing  Intervention: Provide Person-Centered Care  Flowsheets (Taken 2/20/2025 0915)  Trust Relationship/Rapport:   care explained   thoughts/feelings acknowledged   choices provided   emotional support provided   empathic listening provided   questions answered   questions encouraged   reassurance provided     Problem: Chemotherapy Effects  Goal: Safety Maintained  Outcome: Progressing  Intervention: Promote Safe Chemotherapy Delivery  Flowsheets (Taken 2/20/2025 0915)  Infection Prevention:   equipment surfaces disinfected   hand hygiene promoted   personal protective equipment utilized   rest/sleep promoted  Chemotherapy Environmental Safety:   chemotherapy waste containers in room   protective environment maintained   meticulous hand hygiene promoted   patient environment monitored for safety   personal protective equipment utilized  Goal: Absence of Infection  Outcome: Progressing  Intervention: Prevent Infection and Maximize Resistance  Flowsheets (Taken 2/20/2025 0915)  Infection Prevention:   equipment surfaces disinfected   hand hygiene promoted   personal protective equipment utilized   rest/sleep  promoted     Problem: Fall Injury Risk  Goal: Absence of Fall and Fall-Related Injury  Outcome: Progressing  Intervention: Promote Injury-Free Environment  Flowsheets (Taken 2/20/2025 5108)  Safety Promotion/Fall Prevention:   assistive device/personal item within reach   patient expresses understanding of fall risk and prevention   nonskid shoes/socks when out of bed   in recliner, wheels locked   instructed to call staff for mobility   medications reviewed   high risk medications identified

## 2025-02-20 NOTE — PROGRESS NOTES
Introduced myself to new oncology patient in infusion. Gave my business card with dietitian contact information along with guidance on when to contact me about an appointment, for concerns like significant loss of appetite and weight loss. Will look into her weight loss recently to see if she qualifies for [facility].   Signature: Ana M Navarrete, MPH, RD, LDN

## 2025-02-21 ENCOUNTER — TELEPHONE (OUTPATIENT)
Dept: PAIN MEDICINE | Facility: CLINIC | Age: 75
End: 2025-02-21
Payer: MEDICARE

## 2025-02-21 DIAGNOSIS — C34.32 MALIGNANT NEOPLASM OF LOWER LOBE OF LEFT LUNG: Primary | ICD-10-CM

## 2025-02-21 NOTE — TELEPHONE ENCOUNTER
Reached out to pt to schedule a 4-6 week follow up    Ashley FIGUEROA MA    
Absence of TWO or more brain stem reflexes/Family discussion withdrawal of life-sustaining therapies is anticipated

## 2025-02-25 ENCOUNTER — DOCUMENTATION ONLY (OUTPATIENT)
Dept: RADIATION ONCOLOGY | Facility: CLINIC | Age: 75
End: 2025-02-25
Payer: MEDICARE

## 2025-02-25 NOTE — PLAN OF CARE
Day 4 of outpatient xrt to the lung. She is without complaints. She is getting chemotherapy weekly.Will continue to monitor.

## 2025-02-26 ENCOUNTER — HOSPITAL ENCOUNTER (EMERGENCY)
Facility: HOSPITAL | Age: 75
Discharge: HOME | End: 2025-02-27
Attending: EMERGENCY MEDICINE
Payer: MEDICARE

## 2025-02-26 ENCOUNTER — OFFICE VISIT (OUTPATIENT)
Dept: SPORTS MEDICINE | Facility: CLINIC | Age: 75
End: 2025-02-26
Payer: MEDICARE

## 2025-02-26 ENCOUNTER — OFFICE VISIT (OUTPATIENT)
Dept: PAIN MEDICINE | Facility: CLINIC | Age: 75
End: 2025-02-26
Payer: MEDICARE

## 2025-02-26 ENCOUNTER — DOCUMENTATION ONLY (OUTPATIENT)
Dept: HEMATOLOGY/ONCOLOGY | Facility: CLINIC | Age: 75
End: 2025-02-26
Payer: MEDICARE

## 2025-02-26 ENCOUNTER — TELEPHONE (OUTPATIENT)
Dept: PAIN MEDICINE | Facility: CLINIC | Age: 75
End: 2025-02-26
Payer: MEDICARE

## 2025-02-26 ENCOUNTER — PATIENT MESSAGE (OUTPATIENT)
Dept: HEMATOLOGY/ONCOLOGY | Facility: CLINIC | Age: 75
End: 2025-02-26
Payer: MEDICARE

## 2025-02-26 VITALS
SYSTOLIC BLOOD PRESSURE: 114 MMHG | BODY MASS INDEX: 27.34 KG/M2 | WEIGHT: 154.31 LBS | DIASTOLIC BLOOD PRESSURE: 77 MMHG | RESPIRATION RATE: 17 BRPM | HEIGHT: 63 IN

## 2025-02-26 VITALS — BODY MASS INDEX: 26.05 KG/M2 | WEIGHT: 147 LBS | HEIGHT: 63 IN

## 2025-02-26 DIAGNOSIS — M17.0 PRIMARY OSTEOARTHRITIS OF BOTH KNEES: Primary | ICD-10-CM

## 2025-02-26 DIAGNOSIS — R33.9 URINARY RETENTION: ICD-10-CM

## 2025-02-26 DIAGNOSIS — M67.813 TENDINOSIS OF RIGHT ROTATOR CUFF: ICD-10-CM

## 2025-02-26 DIAGNOSIS — M25.512 CHRONIC PAIN OF BOTH SHOULDERS: Primary | ICD-10-CM

## 2025-02-26 DIAGNOSIS — R53.83 FATIGUE: ICD-10-CM

## 2025-02-26 DIAGNOSIS — M19.011 OSTEOARTHRITIS OF AC (ACROMIOCLAVICULAR) JOINTS, BILATERAL: ICD-10-CM

## 2025-02-26 DIAGNOSIS — M25.511 CHRONIC PAIN OF BOTH SHOULDERS: Primary | ICD-10-CM

## 2025-02-26 DIAGNOSIS — M67.814 TENDINOSIS OF LEFT ROTATOR CUFF: ICD-10-CM

## 2025-02-26 DIAGNOSIS — M19.012 OSTEOARTHRITIS OF AC (ACROMIOCLAVICULAR) JOINTS, BILATERAL: ICD-10-CM

## 2025-02-26 DIAGNOSIS — M19.019 AC JOINT ARTHROPATHY: ICD-10-CM

## 2025-02-26 DIAGNOSIS — G89.29 CHRONIC PAIN OF BOTH SHOULDERS: Primary | ICD-10-CM

## 2025-02-26 PROCEDURE — 99999 PR PBB SHADOW E&M-EST. PATIENT-LVL IV: CPT | Mod: PBBFAC,,, | Performed by: PHYSICIAN ASSISTANT

## 2025-02-26 PROCEDURE — 1125F AMNT PAIN NOTED PAIN PRSNT: CPT | Mod: CPTII,S$GLB,, | Performed by: PHYSICIAN ASSISTANT

## 2025-02-26 PROCEDURE — 3074F SYST BP LT 130 MM HG: CPT | Mod: CPTII,S$GLB,, | Performed by: PHYSICIAN ASSISTANT

## 2025-02-26 PROCEDURE — 1159F MED LIST DOCD IN RCRD: CPT | Mod: CPTII,S$GLB,, | Performed by: PHYSICIAN ASSISTANT

## 2025-02-26 PROCEDURE — 3078F DIAST BP <80 MM HG: CPT | Mod: CPTII,S$GLB,, | Performed by: PHYSICIAN ASSISTANT

## 2025-02-26 PROCEDURE — 99999 PR PBB SHADOW E&M-EST. PATIENT-LVL IV: CPT | Mod: PBBFAC,,, | Performed by: STUDENT IN AN ORGANIZED HEALTH CARE EDUCATION/TRAINING PROGRAM

## 2025-02-26 PROCEDURE — 3288F FALL RISK ASSESSMENT DOCD: CPT | Mod: CPTII,S$GLB,, | Performed by: PHYSICIAN ASSISTANT

## 2025-02-26 PROCEDURE — 99212 OFFICE O/P EST SF 10 MIN: CPT | Mod: S$GLB,,, | Performed by: PHYSICIAN ASSISTANT

## 2025-02-26 PROCEDURE — 1101F PT FALLS ASSESS-DOCD LE1/YR: CPT | Mod: CPTII,S$GLB,, | Performed by: PHYSICIAN ASSISTANT

## 2025-02-26 PROCEDURE — 99285 EMERGENCY DEPT VISIT HI MDM: CPT | Mod: 25

## 2025-02-26 RX ORDER — TRIAMCINOLONE ACETONIDE 40 MG/ML
40 INJECTION, SUSPENSION INTRA-ARTICULAR; INTRAMUSCULAR
Status: DISCONTINUED | OUTPATIENT
Start: 2025-02-26 | End: 2025-02-26 | Stop reason: HOSPADM

## 2025-02-26 RX ADMIN — TRIAMCINOLONE ACETONIDE 40 MG: 40 INJECTION, SUSPENSION INTRA-ARTICULAR; INTRAMUSCULAR at 01:02

## 2025-02-26 NOTE — PROGRESS NOTES
"      Patient ID: Cassandra Campos  YOB: 1950  MRN: [id number]    Chief Complaint: Pain of the Left Shoulder and Pain of the Right Shoulder      History of Present Illness: Cassandra Campos is a right-hand dominant 74 y.o. female who presents today with bilateral shoulder pain, left greater than the right.  Patient reports that she was getting out of bed 2 days ago and hit her left shoulder causing her to slide to the floor.  Currently rates her pain at an 8/10 and reports that pain comes and goes.  Identifies pain at the AC joint region of the left shoulder.  Last seen in clinic on 10/23/2024 and received bilateral subacromial injections.  Was later seen by Dr. Troy Fontenot on 12/17/2024 and received bilateral AC joint injections.  Reports that both injections were helpful and interested in repeating.  She is currently receiving treatment for cancer and "just wants her shoulders to feel better".    10/23/2024 Interval History of Present Illness: Cassandra Campos is a right-hand dominant 74 y.o. female who presents today with bilateral shoulder and bilateral knee pain.  Last office visit was on 8/21/2024 where she received bilateral Synvisc One injections to knees.  She can't recall how helpful injections were as she was dealing with cancer issues and having the cancer removed.  Believes that her knee pain started again 2 months ago.  Was seen in clinic on 6/26/2024 and received a left knee and left shoulder cortisone injection.  Was seen on 6/12/2024 for right knee cortisone and right shoulder cortisone injections.  Reports today that she is having both shoulder and knee pain.  Rates her pain today at a 9/10 and interested in repeating CSI.      7/18/2024 Interval History of Present Illness: Cassandra Campos is a right-hand dominant 73 y.o. female who presents today with bilateral knee pain, right greater than left.  Reports that she bumped her right knee recently and has increased her pain. " Last seen in clinic on 6/26/2024 and received CSI to the    Left knee.  Received cSI to the right knee on 6/12/2024.  Currently rates pain at a 10/10 with constant throbbing pain that keeps her up at night.  Patient reports pain when walking and sitting.  Completed PT last week and currently taking Mobic and Tylenol.     6/26/2024 Interval History of Present Illness: Cassandra Campos is a right-hand dominant 73 y.o. female who presents today with left knee and left shoulder pain.   Patient notes pain is at worst a 10/10 at times affecting her activity of daily living and thus her quality of life.  She has not using any devices to assist with ambulation nor is she wearing any knee braces.  She states she takes Tylenol as needed to help with the pain .  She states she has having a hard time walking long distances, going from a sitting to standing or standing to seated position, unlevel terrain or stairs.  She also complains of left shoulder pain and dysfunction that had started 3-4 weeks . Her symptoms included pain and lack of range of motion. Her pain was aggravated by overhead movement, reaching movements. She had tried rest, activity modification, Muscle relaxer, ibuprofen, heat and ice.     6/12/2024 Interval History of Present Illness: Cassandra Campos is a right-hand dominant 73 y.o. female who presents today with bilateral shoulder pain and bilateral knee pain.  73-year-old female with history of HTN, HLD, and DM  with c/o left shoulder pain.  States pain to left shoulder with touch and movement. States difficulty lifting shoulder above head and mostly keeping her up at night.. States taking OTC NSAIDs with minimal relief. She was seen by  POLO Alan and received left knee CSI on 3/22/24. Received SA steroid injection by Dr. Palma on 2/07/2024.  Pt states she was concerned she is still having pain. MRI left shoulder on file. Patient is currently doing Home health physical therapy.          The patient  is active in none.  Occupation: Retired      Past Medical History:   Past Medical History:   Diagnosis Date    Arthritis     hands    Bilateral bunions     Borderline glaucoma     De Quervain's disease (radial styloid tenosynovitis)     Gastritis     upper GI 2017    Hydradenitis     Hyperlipidemia     Hypertension     Insomnia     Migraines 2000    Nasal septum perforation     Obesity     Pneumonia     Restrictive airway disease     Sleep apnea     SVT (supraventricular tachycardia) 2013    Trigger finger     Type 2 diabetes mellitus 2012     am 2024     Past Surgical History:   Procedure Laterality Date    AXILLARY HIDRADENITIS EXCISION Bilateral     BONE EXOSTOSIS EXCISION Right 2018    Procedure: EXCISION, EXOSTOSIS;  Surgeon: Jayro Pedraza Sr., MD;  Location: [facility];  Service: Orthopedics;  Laterality: Right;    BREAST BIOPSY Bilateral     both benign    BREAST SURGERY  1998    BRONCHOSCOPY Bilateral 1/15/2025    Procedure: Bronchoscopy - insert lighted tube into airway to take a biopsy of lung;  Surgeon: Adrian Robin MD;  Location: [facility];  Service: Pulmonary;  Laterality: Bilateral;    CARPAL TUNNEL RELEASE      bilateral    CARPAL TUNNEL RELEASE Right 2024    Procedure: RELEASE, CARPAL TUNNEL;  Surgeon: Jaime Oswald MD;  Location: [facility];  Service: Orthopedics;  Laterality: Right;    CARPAL TUNNEL RELEASE Left 2024    Procedure: RELEASE, CARPAL TUNNEL;  Surgeon: Jaime Oswald MD;  Location: [facility];  Service: Orthopedics;  Laterality: Left;    CATARACT EXTRACTION Bilateral     OU     SECTION  1979    CHOLECYSTECTOMY  2014    COLONOSCOPY N/A 10/02/2020    Procedure: COLONOSCOPY;  Surgeon: Tushar Edwards MD;  Location: [facility];  Service: Endoscopy;  Laterality: N/A;    COLONOSCOPY W/ POLYPECTOMY  10/02/2020    Polyps x3, repeat 5 years; Tushar Edwards MD     CYST REMOVAL  2015    sebaceous cyst removed from face     DE QUERVAIN'S RELEASE Left 01/16/2020    Procedure: RELEASE, HAND, FOR DEQUERVAIN'S TENOSYNOVITIS;  Surgeon: Jaime Oswald MD;  Location: [facility];  Service: Orthopedics;  Laterality: Left;    DE QUERVAIN'S RELEASE Right 11/20/2020    Procedure: RELEASE, HAND, FOR DEQUERVAIN'S TENOSYNOVITIS;  Surgeon: Jaime Oswald MD;  Location: [facility];  Service: Orthopedics;  Laterality: Right;    ENDOBRONCHIAL ULTRASOUND Bilateral 04/10/2024    Procedure: ENDOBRONCHIAL ULTRASOUND (EBUS);  Surgeon: Adrian Robin MD;  Location: [facility];  Service: Pulmonary;  Laterality: Bilateral;    ENDOBRONCHIAL ULTRASOUND Bilateral 1/15/2025    Procedure: ENDOBRONCHIAL ULTRASOUND (EBUS);  Surgeon: Adrian Robin MD;  Location: [facility];  Service: Pulmonary;  Laterality: Bilateral;    EYE SURGERY      gastric sleeve  02/13/2017    Dr. Watson    INJECTION OF ANESTHETIC AGENT AROUND MULTIPLE INTERCOSTAL NERVES  5/10/2024    Procedure: BLOCK, NERVE, INTERCOSTAL, 2 OR MORE;  Surgeon: Mike Nuñez MD;  Location: [facility];  Service: Cardiothoracic;;    KNEE SURGERY Right     OLECRANON BURSECTOMY Right 07/25/2018    Procedure: BURSECTOMY, OLECRANON;  Surgeon: Jayro Pedraza Sr., MD;  Location: [facility];  Service: Orthopedics;  Laterality: Right;    SURGICAL REMOVAL OF BUNION WITH OSTEOTOMY OF METATARSAL BONE Left 05/10/2019    Procedure: BUNIONECTOMY, WITH METATARSAL OSTEOTOMY;  Surgeon: Srinivasan Villanueva DPM;  Location: [facility];  Service: Podiatry;  Laterality: Left;    SURGICAL REMOVAL OF BUNION WITH OSTEOTOMY OF METATARSAL BONE Right 06/28/2019    Procedure: BUNIONECTOMY, WITH METATARSAL OSTEOTOMY;  Surgeon: Srinivasan Villanueva DPM;  Location: [facility];  Service: Podiatry;  Laterality: Right;    SURGICAL REMOVAL OF LYMPH NODE Left 5/10/2024    Procedure: EXCISION, LYMPH NODE;  Surgeon: Mike Nuñez MD;  Location: [facility];  Service: Cardiothoracic;  Laterality: Left;    TONSILLECTOMY, ADENOIDECTOMY  1980s     TRANSESOPHAGEAL ECHOCARDIOGRAM WITH POSSIBLE CARDIOVERSION (LAKESHIA W/ POSS CARDIOVERSION) N/A 5/15/2024    Procedure: Transesophageal echo (LAKESHIA) intra-procedure log documentation;  Surgeon: Twan Pelaez MD;  Location: [facility] CATH LAB;  Service: Cardiology;  Laterality: N/A;    TRIGGER FINGER RELEASE Right 04/01/2015    Dr. Milo HALL ROBOTIC RATS,WITH LOBECTOMY,LUNG Left 5/10/2024    Procedure: XI ROBOTIC RATS,WITH LOBECTOMY,LUNG;  Surgeon: Mike Nuñez MD;  Location: [facility] OR;  Service: Cardiothoracic;  Laterality: Left;  Lingulectomy     Family History   Problem Relation Name Age of Onset    Prostate cancer Brother      Diabetes Maternal Aunt Alnodra Campos     Diabetes Cousin      Hypertension Maternal Grandmother Portia Orosco      Social History[1]  Medication List with Changes/Refills   Current Medications    ALBUTEROL (PROVENTIL/VENTOLIN HFA) 90 MCG/ACTUATION INHALER    INHALE 2 PUFFS INTO THE LUNGS EVERY 4 HOURS AS NEEDED FOR WHEEZING OR SHORTNESS OF BREATH.    AMITRIPTYLINE (ELAVIL) 50 MG TABLET    Take 1 tablet (50 mg total) by mouth every evening.    ASPIRIN (ECOTRIN) 81 MG EC TABLET    Take 1 tablet (81 mg total) by mouth once daily.    BLOOD SUGAR DIAGNOSTIC STRP    use to test blood sugar 1-2 times a day    BLOOD-GLUCOSE METER (ACCU-CHEK GUIDE ME GLUCOSE MTR) MISC    use to check blood glucose 2 times a day    BLOOD-GLUCOSE METER (ACCU-CHEK GUIDE ME GLUCOSE MTR) MISC    To check blood glucose 1-2 times daily, to use with insurance preferred meter    ESZOPICLONE (LUNESTA) 3 MG TAB    Take 1 tablet (3 mg total) by mouth every evening.    LANCETS MISC    Use to test blood glucose 1-2 times a day, discard lancet after each use    LORAZEPAM (ATIVAN) 1 MG TABLET    Take 1 tablet (1 mg total) by mouth 2 (two) times daily.    LOSARTAN (COZAAR) 25 MG TABLET    Take 1 tablet (25 mg total) by mouth once daily.    METOPROLOL SUCCINATE (TOPROL-XL) 50 MG 24 HR TABLET    Take 1 tablet (50 mg total) by mouth once daily.     OLANZAPINE (ZYPREXA) 5 MG TABLET    Take 1 tablet (5 mg total) by mouth every evening. On days 1-3 of each chemotherapy cycle.    OMEPRAZOLE (PRILOSEC) 20 MG CAPSULE    Take 1 capsule (20 mg total) by mouth once daily.    PROCHLORPERAZINE (COMPAZINE) 5 MG TABLET    Take 1 tablet (5 mg total) by mouth every 6 (six) hours as needed for Nausea.    SEMAGLUTIDE (OZEMPIC) 2 MG/DOSE (8 MG/3 ML) PNIJ    Inject 2 mg into the skin every 7 days.    TRAMADOL (ULTRAM) 50 MG TABLET    Take 1 tablet (50 mg total) by mouth 2 (two) times a day.    TRAMADOL (ULTRAM) 50 MG TABLET    Take 1 tablet (50 mg total) by mouth every 12 (twelve) hours as needed for Pain. Greater than 7 day supply medically necessary.    TRIAMCINOLONE ACETONIDE 0.025 % LOTN    Apply small amount to affective areas twice a day  take cool showers or baths     Review of patient's allergies indicates:  No Known Allergies    Physical Exam:   Body mass index is 26.04 kg/m².    GENERAL: Well appearing, in no acute distress.  HEAD: Normocephalic and atraumatic.  ENT: External ears and nose grossly normal.  EYES: EOMI bilaterally  PULMONARY: Respirations are grossly even and non-labored.  NEURO: Awake, alert, and oriented x 3.  SKIN: No obvious rashes appreciated.  PSYCH: Mood & affect are appropriate.    Detailed MSK exam:     Left shoulder exam:   -ROM: abduction 100, forward flexion 100, external rotation 70, internal rotation 60  -empty can test pain but no weakness, resisted ER negative, belly press pain but no weakness  -landaverde test positive, neers test positive, whipple test positive  -biceps load test negative, yerguson test negative, Ashe's test negative  -sensation intact, pulses 2+  -TTP: lateral cuff insertion     Right shoulder exam:   -ROM: abduction 100, forward flexion 100, external rotation 70, internal rotation 60  -empty can test pain but no weakness, resisted ER negative, belly press pain but no weakness  -landaverde test positive, neers test  positive, whipple test positive  -biceps load test negative, yerguson test negative, [place]-Hyder's test negative  -sensation intact, pulses 2+  -TTP: lateral cuff insertion    Imaging:  Sports Medicine US - Guidance for Needle Placement  Naman Barton MD     2/12/2025  1:56 PM  Sports Medicine US - Guidance for Needle Placement    Date/Time: 2/12/2025 2:00 PM    Performed by: Naman Barton MD  Authorized by: Naman Barton MD  Preparation: Patient was prepped and   draped in the usual sterile fashion.  Local anesthesia used: no    Anesthesia:  Local anesthesia used: no    Sedation:  Patient sedated: no    Patient tolerance: patient tolerated the procedure well with no immediate   complications  Comments: Ultrasound guidance was used for needle localization. Images   were saved and stored for documentation. The appropriate structures were   visualized. Dynamic visualization of the needle was continuous throughout   the procedures and maintained good position.         Relevant imaging results were reviewed and interpreted by me and per my read shows mild AC arthritic changes.  This was discussed with the patient and / or family today.     Assessment:  Cassandra Campos is a 74 y.o. female following up for bilateral shoulder pain. Interested in repeating steroid injections today. SA injections lasted longer than AC injections.   Plan: Steroid injection given today (see separate procedure note for details). We discussed the proper protocols after the injection such as no submerging pools, baths tubs, or hot tubs for 24 hr.  Showering is okay today.  We also discussed that blood sugars can be elevated after an injection and asked patient to properly checked her sugars over the next few days and contact their PCP if there are any concerns.  We discussed red flags such as fevers, chills, red, warm, tender joint at the area of injection to please seek medical care immediately.   Continue conservative management  for pain.   Follow up as needed. All questions answered.     Chronic pain of both shoulders  -     Sports Medicine US - Guidance for Needle Placement    Osteoarthritis of AC (acromioclavicular) joints, bilateral  -     Large Joint Aspiration/Injection: bilateral subacromial bursa    Tendinosis of left rotator cuff  -     Large Joint Aspiration/Injection: bilateral subacromial bursa    Tendinosis of right rotator cuff  -     Large Joint Aspiration/Injection: bilateral subacromial bursa         Ultrasound guidance was used for needle localization. Images were saved and stored for documentation. The appropriate structures were visualized. Dynamic visualization of the needle was continuous throughout the procedures and maintained good position.      Electronically signed:  Naman Barton MD, MPH  2025  1:26 PM         [1]   Social History  Socioeconomic History    Marital status:     Number of children: 1   Occupational History    Occupation:  aid   Tobacco Use    Smoking status: Former     Current packs/day: 0.00     Average packs/day: 0.5 packs/day for 42.0 years (21.0 ttl pk-yrs)     Types: Cigarettes     Start date: 1970     Quit date: 2012     Years since quittin.1     Passive exposure: Past    Smokeless tobacco: Never   Substance and Sexual Activity    Alcohol use: Not Currently     Alcohol/week: 1.0 standard drink of alcohol     Types: 1 Glasses of wine per week     Comment: Glass red wine once a every 2 weeks    Drug use: Never    Sexual activity: Not Currently     Partners: Female     Birth control/protection: Abstinence, None   Social History Narrative    Single, part-time teacher. Masters degree biology.      Social Drivers of Health     Financial Resource Strain: Patient Declined (2024)    Overall Financial Resource Strain (CARDIA)     Difficulty of Paying Living Expenses: Patient declined   Food Insecurity: Patient Declined (2024)    Hunger Vital  Sign     Worried About Running Out of Food in the Last Year: Patient declined     Ran Out of Food in the Last Year: Patient declined   Transportation Needs: No Transportation Needs (12/19/2023)    PRAPARE - Transportation     Lack of Transportation (Medical): No     Lack of Transportation (Non-Medical): No   Physical Activity: Insufficiently Active (12/19/2024)    Exercise Vital Sign     Days of Exercise per Week: 3 days     Minutes of Exercise per Session: 20 min   Stress: Stress Concern Present (12/19/2024)    [employer] [place] [employer] - Occupational Stress Questionnaire     Feeling of Stress : Very much   Housing Stability: High Risk (12/19/2024)    Housing Stability Vital Sign     Unable to Pay for Housing in the Last Year: Yes

## 2025-02-26 NOTE — PROGRESS NOTES
SW and SW intern met with pt in Radiation clinic room to introduce self and assess for any needs/concerns that pt may have r/t start of new treatment. SW provided a new pt folder and explained contents. SW provided Vanilla Glucerna samples. SW encouraged pt to call if transportation is needed to Rad treatments as this is the only [employer] account with funds. Pt has 1 son that lives in  and other family in [place], [place]. Pt has some short term memory loss as evidenced by pt asking the same questions repetitively re: her appt's and instructions that were given by Rad nurse to address her mouth issues. SW provided a printout of pt's appt's and had Rad nurse provide another handout re: oral care/mouth rinsing, as pt did not recall their previous conversation. SW will submit referral to [facility] for them to contact pt re: screening for the one-time isael for $150. No other needs expressed. Pt will call SW at number provided if future needs arise. SW will remain available.

## 2025-02-26 NOTE — PATIENT INSTRUCTIONS
Assessment:  Cassandra Campos is a 74 y.o. female   Chief Complaint   Patient presents with    Left Shoulder - Pain    Right Shoulder - Pain       No diagnosis found.     Plan:  Ultrasound guided subacromial cortisone injections to bilateral shoulders  We discussed the proper protocols after the injection such as no submerging pools, baths tubs, or hot tubs for 24 hr.  Showering is okay today.  We also discussed that blood sugars can be elevated after an injection and asked patient to properly checked her sugars over the next few days and contact their PCP if there are any concerns.  We discussed red flags such as fevers, chills, red, warm, tender joint at the area of injection to please seek medical care immediately.    Follow up as needed    Follow-up: as needed.    Thank you for choosing [facility] and Dr. Naman Barton for your orthopedic & sports medicine care. It is our goal to provide you with exceptional care that will help keep you healthy, active, and get you back in the game.    Please do not hesitate to reach out to us via email, phone, or MyChart with any questions, concerns, or feedback.    If you felt that you received exemplary care today, please consider leaving us feedback on [employer] at:  [email or web address]    If you are experiencing pain/discomfort ,or have questions after 5pm and would like to be connected to the [facility] on-call team, please call this number and specify which Sports Medicine provider is treating you: [phone]

## 2025-02-26 NOTE — PROCEDURES
Large Joint Aspiration/Injection: bilateral subacromial bursa    Date/Time: 2/26/2025 1:40 PM    Performed by: Naman Barton MD  Authorized by: Naman Barton MD    Consent Done?:  Yes (Verbal)  Indications:  Pain  Site marked: the procedure site was marked    Timeout: prior to procedure the correct patient, procedure, and site was verified    Prep: patient was prepped and draped in usual sterile fashion    Local anesthetic:  Bupivacaine 0.5% without epinephrine and lidocaine 1% without epinephrine    Details:  Needle Size:  21 G  Ultrasonic Guidance for needle placement?: Yes    Images are saved and documented.  Approach:  Lateral  Location:  Shoulder  Laterality:  Bilateral  Site:  Bilateral subacromial bursa  Medications (Right):  40 mg triamcinolone acetonide 40 mg/mL  Medications (Left):  40 mg triamcinolone acetonide 40 mg/mL  Patient tolerance:  Patient tolerated the procedure well with no immediate complications     Ultrasound guidance was used for needle localization. Images were saved and stored for documentation. The appropriate structures were visualized. Dynamic visualization of the needle was continuous throughout the procedures and maintained good position.

## 2025-02-26 NOTE — PROGRESS NOTES
Established Patient Chronic Pain Note (Follow up Visit)    Referring Physician: No ref. provider found    PCP: Emil Zhang MD    Chief Complaint:   4-6 week follow up       SUBJECTIVE:      Interval History (2/26/2025):  Cassandra Campos presents today for follow-up visit.  Patient was last seen on 1/27/2025. At that visit, the plan was to consider injections for shoulder/knee pain. Patient states she is currently seeing Dr. Barton for injections. She saw him earlier today and had both shoulders injected.   She continues to take Tramadol 50 mg BID as needed. Her only new issue is difficulty urinating. She reports that she tries to stay hydrated throughout the day and even bought a nugget ice machine to make sure she drinks plenty of water. She denies burning with urination or hematuria . Patient reports pain as 8/10 today.    Initial HPI (1/27/2025):  Cassandra Campos is a 74 y.o. female who presents to the clinic for the evaluation of bilateral knee and shoulder pain.  She was referred by Orthopedics for further evaluation and management of this pain.  She has past medical history of carpal tunnel syndrome, panic attacks, anxiety, depression, asthma, chronic bronchitis, hyperlipidemia, hypertension, atrial flutter, lung cancer history, DM2, obesity, GERD, polyarthritis, osteopenia, multiple other medical comorbidities as listed in her chart.  The pain started several years ago and symptoms have been off and on.The pain is located in the bilateral shoulders and bilateral knees.  She reports that the knee pain is relatively controlled at this time and mostly has pain isolated to the left shoulder currently.  The pain is described as  sharp, stabbing, aching  and is rated as  8/10 . The pain is rated with a score of  5/10 on the BEST day and a score of 9/10 on the WORST day.  Symptoms interfere with daily activity. The pain is exacerbated by use of the left arm and shoulder.  The pain is mitigated by  medications and rest.  She has found tremendous benefit with tramadol 50 mg b.i.d. p.r.n. which was recently prescribed by her primary care provider.    Patient denies night fever/night sweats, urinary incontinence, bowel incontinence, significant weight loss, significant motor weakness, and loss of sensations.    Pain Disability Index Review:         2/26/2025     2:12 PM 1/27/2025    10:34 AM   Last 3 PDI Scores   Pain Disability Index (PDI) 48 56       Non-Pharmacologic Treatments:  Physical Therapy/Home Exercise: yes  Ice/Heat:yes  TENS: no  Acupuncture: no  Massage: yes  Chiropractic: no    Other: no      Pain Medications:  - Opioids:  Tramadol , Norco  - Adjuvant Medications:  Lorazepam, eszopiclone, Elavil, diclofenac, diclofenac gel, lidocaine patch  - Anti-Coagulants: Aspirin     report:  Reviewed and consistent with medication use as prescribed.    Pain Procedures:   -previous subacromial bursal injections   -previous AC joint injections   -previous knee joint injections      Imaging:   CT abdomen pelvis 12/11/2024:  LUNG BASES: Since the comparison PET-CT scan, the lingular mass has been resected.  Dependent atelectasis in the lung bases again seen.  Interval development of a 1.2 cm posterior left lower lobe pleural base nodule as measured on axial image 35.  Negative for pleural or pericardial effusions. The distal esophagus is normal.  Interval increase in size of a left hilar mass measuring 2.1 cm on axial image 10.  Extensive coronary artery calcifications again seen.  Small hiatal hernia again seen.     ABDOMEN: Cholecystectomy clips.  Mildly progressive prominence of the biliary tree with the common bile duct measuring 1.4 cm in diameter on axial image 66.  Subtle low-attenuation focus in the right hepatic lobe near the gallbladder fossa measuring 1.8 cm on axial image 82. The spleen appears normal.  The pancreas is unremarkable.  Kidneys and adrenal glands are normal.     Stable mesenteric and  retroperitoneal prominent lymph nodes, the largest of which is adjacent to the pancreatic head measuring 2.0 x 1.1 cm on axial image 69.  There also some enlarged portacaval space lymph nodes.     Negative for scratch ascites or inflammatory change noted within the abdomen or pelvis.  Vascular calcifications are present without aneurysmal changes. Portal vein is patent.     Gastric sleeve postoperative changes noted.  Normal appendix.  Appropriate stool.  Fluid-filled loops of small bowel noted.  There are 1 or 2 borderline dilated small bowel loops within the left upper quadrant, similar to the comparison study.  Negative for dilated loops of colon.  Negative for free air.     PELVIS: The urinary bladder is unremarkable.    The female pelvic organs are normal. There are pelvic phleboliths.     Mild laxity of the linea alba with a small fat filled umbilical hernia again seen.  The abdominal wall is otherwise intact.     No significant osseous abnormality is identified.  Negative for significant spinal canal stenosis. Multilevel marginal spondylosis especially involving the thoracic region noted.  Degenerative facet arthropathy.  Age-appropriate degenerative changes of the pelvis and hips.  Vacuum phenomenon of the sacroiliac joints.     Negative for groin adenopathy.    X-ray right knee 07/18/2024:  The joint spaces of all 3 compartments of the right knee appear to be relatively well maintained. No joint effusion. No acute fracture or dislocation.     X-ray left knee 04/25/2024:  Mild medial joint space narrowing.  No fracture or dislocation.  No joint effusion.  Minimal spurring of patella at the patellofemoral joint space.         Past Medical History:   Diagnosis Date    Arthritis     hands    Bilateral bunions     Borderline glaucoma     De Quervain's disease (radial styloid tenosynovitis)     Gastritis     upper GI 2/2017    Hydradenitis     Hyperlipidemia     Hypertension     Insomnia     Migraines 02/01/2000     Nasal septum perforation     Obesity     Pneumonia     Restrictive airway disease     Sleep apnea     SVT (supraventricular tachycardia) 2013    Trigger finger     Type 2 diabetes mellitus 2012     am 2024     Past Surgical History:   Procedure Laterality Date    AXILLARY HIDRADENITIS EXCISION Bilateral     BONE EXOSTOSIS EXCISION Right 2018    Procedure: EXCISION, EXOSTOSIS;  Surgeon: Jayro Pedraza Sr., MD;  Location: [facility];  Service: Orthopedics;  Laterality: Right;    BREAST BIOPSY Bilateral     both benign    BREAST SURGERY  1998    BRONCHOSCOPY Bilateral 1/15/2025    Procedure: Bronchoscopy - insert lighted tube into airway to take a biopsy of lung;  Surgeon: Adrian Robin MD;  Location: [facility];  Service: Pulmonary;  Laterality: Bilateral;    CARPAL TUNNEL RELEASE      bilateral    CARPAL TUNNEL RELEASE Right 2024    Procedure: RELEASE, CARPAL TUNNEL;  Surgeon: Jaime Oswald MD;  Location: [facility];  Service: Orthopedics;  Laterality: Right;    CARPAL TUNNEL RELEASE Left 2024    Procedure: RELEASE, CARPAL TUNNEL;  Surgeon: Jaime Oswald MD;  Location: [facility];  Service: Orthopedics;  Laterality: Left;    CATARACT EXTRACTION Bilateral     OU     SECTION  1979    CHOLECYSTECTOMY  2014    COLONOSCOPY N/A 10/02/2020    Procedure: COLONOSCOPY;  Surgeon: Tushar Edwards MD;  Location: [facility];  Service: Endoscopy;  Laterality: N/A;    COLONOSCOPY W/ POLYPECTOMY  10/02/2020    Polyps x3, repeat 5 years; Tushar Edwards MD     CYST REMOVAL  2015    sebaceous cyst removed from face    DE QUERVAIN'S RELEASE Left 2020    Procedure: RELEASE, HAND, FOR DEQUERVAIN'S TENOSYNOVITIS;  Surgeon: Jaime Oswald MD;  Location: [facility];  Service: Orthopedics;  Laterality: Left;    DE QUERVAIN'S RELEASE Right 2020    Procedure: RELEASE, HAND, FOR DEQUERVAIN'S TENOSYNOVITIS;  Surgeon: Jaime Oswald MD;  Location: [facility];  Service: Orthopedics;  Laterality: Right;    ENDOBRONCHIAL ULTRASOUND Bilateral 04/10/2024    Procedure: ENDOBRONCHIAL ULTRASOUND (EBUS);  Surgeon: Adrian Robin MD;  Location: [facility] ENDO;  Service: Pulmonary;  Laterality: Bilateral;    ENDOBRONCHIAL ULTRASOUND Bilateral 1/15/2025    Procedure: ENDOBRONCHIAL ULTRASOUND (EBUS);  Surgeon: Adrian Robin MD;  Location: [facility] ENDO;  Service: Pulmonary;  Laterality: Bilateral;    EYE SURGERY      gastric sleeve  02/13/2017    Dr. Watson    INJECTION OF ANESTHETIC AGENT AROUND MULTIPLE INTERCOSTAL NERVES  5/10/2024    Procedure: BLOCK, NERVE, INTERCOSTAL, 2 OR MORE;  Surgeon: Mike Nuñez MD;  Location: [facility] OR;  Service: Cardiothoracic;;    KNEE SURGERY Right     OLECRANON BURSECTOMY Right 07/25/2018    Procedure: BURSECTOMY, OLECRANON;  Surgeon: Jayro Pedraza Sr., MD;  Location: [facility] OR;  Service: Orthopedics;  Laterality: Right;    SURGICAL REMOVAL OF BUNION WITH OSTEOTOMY OF METATARSAL BONE Left 05/10/2019    Procedure: BUNIONECTOMY, WITH METATARSAL OSTEOTOMY;  Surgeon: Srinivasan Villanueva DPM;  Location: [facility] OR;  Service: Podiatry;  Laterality: Left;    SURGICAL REMOVAL OF BUNION WITH OSTEOTOMY OF METATARSAL BONE Right 06/28/2019    Procedure: BUNIONECTOMY, WITH METATARSAL OSTEOTOMY;  Surgeon: Srinivasan Villanueva DPM;  Location: [facility] OR;  Service: Podiatry;  Laterality: Right;    SURGICAL REMOVAL OF LYMPH NODE Left 5/10/2024    Procedure: EXCISION, LYMPH NODE;  Surgeon: Mike Nuñez MD;  Location: [facility] OR;  Service: Cardiothoracic;  Laterality: Left;    TONSILLECTOMY, ADENOIDECTOMY  1980s    TRANSESOPHAGEAL ECHOCARDIOGRAM WITH POSSIBLE CARDIOVERSION (LAKESHIA W/ POSS CARDIOVERSION) N/A 5/15/2024    Procedure: Transesophageal echo (LAKESHIA) intra-procedure log documentation;  Surgeon: Twan Pelaez MD;  Location: [facility] CATH LAB;  Service: Cardiology;  Laterality: N/A;    TRIGGER FINGER RELEASE Right 04/01/2015    Dr. Milo HALL ROBOTIC RATS,WITH  LOBECTOMY,LUNG Left 5/10/2024    Procedure: XI ROBOTIC RATS,WITH LOBECTOMY,LUNG;  Surgeon: Mike Nuñez MD;  Location: [facility];  Service: Cardiothoracic;  Laterality: Left;  Lingulectomy     Social History     Socioeconomic History    Marital status:     Number of children: 1   Occupational History    Occupation:  aid   Tobacco Use    Smoking status: Former     Current packs/day: 0.00     Average packs/day: 0.5 packs/day for 42.0 years (21.0 ttl pk-yrs)     Types: Cigarettes     Start date: 1970     Quit date: 2012     Years since quittin.1     Passive exposure: Past    Smokeless tobacco: Never   Substance and Sexual Activity    Alcohol use: Not Currently     Alcohol/week: 1.0 standard drink of alcohol     Types: 1 Glasses of wine per week     Comment: Glass red wine once a every 2 weeks    Drug use: Never    Sexual activity: Not Currently     Partners: Female     Birth control/protection: Abstinence, None   Social History Narrative    Single, part-time teacher. Masters degree biology.      Social Drivers of Health     Financial Resource Strain: Patient Declined (2024)    Overall Financial Resource Strain (CARDIA)     Difficulty of Paying Living Expenses: Patient declined   Food Insecurity: Patient Declined (2024)    Hunger Vital Sign     Worried About Running Out of Food in the Last Year: Patient declined     Ran Out of Food in the Last Year: Patient declined   Transportation Needs: No Transportation Needs (2023)    PRAPARE - Transportation     Lack of Transportation (Medical): No     Lack of Transportation (Non-Medical): No   Physical Activity: Insufficiently Active (2024)    Exercise Vital Sign     Days of Exercise per Week: 3 days     Minutes of Exercise per Session: 20 min   Stress: Stress Concern Present (2024)    Citizen of [place] of Occupational Health - Occupational Stress Questionnaire     Feeling of Stress : Very much   Housing  Stability: High Risk (12/19/2024)    Housing Stability Vital Sign     Unable to Pay for Housing in the Last Year: Yes     Family History   Problem Relation Name Age of Onset    Prostate cancer Brother      Diabetes Maternal Aunt Alondra Campos     Diabetes Cousin      Hypertension Maternal Grandmother Portia Orosco        Review of patient's allergies indicates:  No Known Allergies    Current Outpatient Medications   Medication Sig    albuterol (PROVENTIL/VENTOLIN HFA) 90 mcg/actuation inhaler INHALE 2 PUFFS INTO THE LUNGS EVERY 4 HOURS AS NEEDED FOR WHEEZING OR SHORTNESS OF BREATH.    amitriptyline (ELAVIL) 50 MG tablet Take 1 tablet (50 mg total) by mouth every evening.    aspirin (ECOTRIN) 81 MG EC tablet Take 1 tablet (81 mg total) by mouth once daily.    blood sugar diagnostic Strp use to test blood sugar 1-2 times a day    blood-glucose meter (ACCU-CHEK GUIDE ME GLUCOSE MTR) Misc use to check blood glucose 2 times a day    blood-glucose meter (ACCU-CHEK GUIDE ME GLUCOSE MTR) Misc To check blood glucose 1-2 times daily, to use with insurance preferred meter    eszopiclone (LUNESTA) 3 mg Tab Take 1 tablet (3 mg total) by mouth every evening.    lancets Misc Use to test blood glucose 1-2 times a day, discard lancet after each use    LORazepam (ATIVAN) 1 MG tablet Take 1 tablet (1 mg total) by mouth 2 (two) times daily.    losartan (COZAAR) 25 MG tablet Take 1 tablet (25 mg total) by mouth once daily.    metoprolol succinate (TOPROL-XL) 50 MG 24 hr tablet Take 1 tablet (50 mg total) by mouth once daily.    OLANZapine (ZYPREXA) 5 MG tablet Take 1 tablet (5 mg total) by mouth every evening. On days 1-3 of each chemotherapy cycle.    omeprazole (PRILOSEC) 20 MG capsule Take 1 capsule (20 mg total) by mouth once daily.    prochlorperazine (COMPAZINE) 5 MG tablet Take 1 tablet (5 mg total) by mouth every 6 (six) hours as needed for Nausea.    semaglutide (OZEMPIC) 2 mg/dose (8 mg/3 mL) PnIj Inject 2 mg into the skin every 7  "days.    traMADoL (ULTRAM) 50 mg tablet Take 1 tablet (50 mg total) by mouth 2 (two) times a day.    traMADoL (ULTRAM) 50 mg tablet Take 1 tablet (50 mg total) by mouth every 12 (twelve) hours as needed for Pain. Greater than 7 day supply medically necessary.    triamcinolone acetonide 0.025 % Lotn Apply small amount to affective areas twice a day  take cool showers or baths     Current Facility-Administered Medications   Medication    sodium chloride 0.9% flush 10 mL    sodium chloride 0.9% flush 10 mL       Review of Systems     GENERAL:  No weight loss, malaise or fevers.  HEENT:   No recent changes in vision or hearing  NECK:  Negative for lumps, no difficulty with swallowing.  RESPIRATORY:  Negative for cough, wheezing or shortness of breath, patient denies any recent URI.  CARDIOVASCULAR:  Negative for chest pain, leg swelling or palpitations.  GI:  Negative for abdominal discomfort, blood in stools or black stools or change in bowel habits.  MUSCULOSKELETAL:  See HPI.  SKIN:  Negative for lesions, rash, and itching.  PSYCH:  No mood disorder or recent psychosocial stressors.  Patients sleep is not disturbed secondary to pain.  HEMATOLOGY/LYMPHOLOGY:  Negative for prolonged bleeding, bruising easily or swollen nodes.  Patient is currently taking anti-coagulants  NEURO:   No history of headaches, syncope, paralysis, seizures or tremors.  All other reviewed and negative other than HPI.    OBJECTIVE:    /77   Pulse (!) (P) 114   Resp 17   Ht 5' 3" (1.6 m)   Wt 70 kg (154 lb 5.2 oz)   BMI 27.34 kg/m²   Physical Exam    GENERAL: Well appearing, in no acute distress, alert and oriented x3.  PSYCH:  Mood and affect appropriate.  SKIN: Skin color, texture, turgor normal, no rashes or lesions.  HEAD/FACE:  Normocephalic, atraumatic. Cranial nerves grossly intact.  CV: RRR with palpation of the radial artery.  PULM: No evidence of respiratory difficulty, symmetric chest rise.  GI:  Soft and non-tender.  BACK: "  Normal range of motion without pain reproduction.  EXTREMITIES: Peripheral joint ROM is full and pain free without obvious instability or laxity in all four extremities. No deformities, edema, or skin discoloration. Good capillary refill.  MUSCULOSKELETAL:  Tenderness over the medial joint lines of each knee.  Positive impingement signs of the left shoulder with Whalen, Neer's, and empty can.  Point tenderness over the left  and right AC joint.  Bilateral upper and lower extremity strength is normal and symmetric.  No atrophy or tone abnormalities are noted.  NEURO: Bilateral upper and lower extremity coordination and muscle stretch reflexes are physiologic and symmetric.  Plantar response are downgoing. No clonus.  No loss of sensation is noted.  GAIT:  Slow, antalgic.      LABS:  Lab Results   Component Value Date    WBC 7.19 02/19/2025    HGB 13.1 02/19/2025    HCT 39.4 02/19/2025    MCV 75 (L) 02/19/2025     02/19/2025       CMP  Sodium   Date Value Ref Range Status   02/19/2025 140 136 - 145 mmol/L Final     Potassium   Date Value Ref Range Status   02/19/2025 3.5 3.5 - 5.1 mmol/L Final     Chloride   Date Value Ref Range Status   02/19/2025 104 95 - 110 mmol/L Final     CO2   Date Value Ref Range Status   02/19/2025 30 (H) 23 - 29 mmol/L Final     Glucose   Date Value Ref Range Status   02/19/2025 77 70 - 110 mg/dL Final     BUN   Date Value Ref Range Status   02/19/2025 14 8 - 23 mg/dL Final     Creatinine   Date Value Ref Range Status   02/19/2025 0.7 0.5 - 1.4 mg/dL Final     Calcium   Date Value Ref Range Status   02/19/2025 9.2 8.7 - 10.5 mg/dL Final     Total Protein   Date Value Ref Range Status   02/19/2025 7.2 6.0 - 8.4 g/dL Final     Albumin   Date Value Ref Range Status   02/19/2025 3.0 (L) 3.5 - 5.2 g/dL Final     Total Bilirubin   Date Value Ref Range Status   02/19/2025 0.3 0.1 - 1.0 mg/dL Final     Comment:     For infants and newborns, interpretation of results should be based  on  gestational age, weight and in agreement with clinical  observations.    Premature Infant recommended reference ranges:  Up to 24 hours.............<8.0 mg/dL  Up to 48 hours............<12.0 mg/dL  3-5 days..................<15.0 mg/dL  6-29 days.................<15.0 mg/dL       Alkaline Phosphatase   Date Value Ref Range Status   02/19/2025 80 40 - 150 U/L Final     AST   Date Value Ref Range Status   02/19/2025 44 (H) 10 - 40 U/L Final     ALT   Date Value Ref Range Status   02/19/2025 134 (H) 10 - 44 U/L Final     Anion Gap   Date Value Ref Range Status   02/19/2025 6 (L) 8 - 16 mmol/L Final     eGFR if    Date Value Ref Range Status   06/21/2022 >60 >60 mL/min/1.73 m^2 Final     eGFR if non    Date Value Ref Range Status   06/21/2022 >60 >60 mL/min/1.73 m^2 Final     Comment:     Calculation used to obtain the estimated glomerular filtration  rate (eGFR) is the CKD-EPI equation.          Lab Results   Component Value Date    HGBA1C 5.7 (H) 08/28/2024             ASSESSMENT: 74 y.o. year old female with chronic shoulder and knee pain, consistent with     1. Primary osteoarthritis of both knees        2. Tendinosis of left rotator cuff        3. Tendinosis of right rotator cuff        4. AC joint arthropathy        5. Urinary retention  Ambulatory referral/consult to Urology        PLAN:   - Interventions:  None at this time. Can consider shoulder, knee block in the future if US guided injections are not beneficial.       - Anticoagulation use: Patient is taking ASA as 1° prevention    - Medications: I have stressed the importance of physical activity and a home exercise plan to help with pain and improve health. and Patient can continue with medications for now since they are providing benefits, using them appropriately, and without side effects.     An opioid pain contract was completed today after having an extensive conversation about chronic opioid use for pain management. We  discussed the risks and benefits of opioids.  I discouraged the patient from escalation of opioid use and try to minimize its use to decrease chance of dependence, tolerance, and opioid-based hyperalgesia.  I reviewed the  in great detail and there are no inconsistencies and it is appropriate with patient's history.  There are no signs of aberrant drug behavior.  The opioid risk tool was also completed, low risk.  Pt counseled about the side effects of long term use of opioids including dependence, tolerance, addiction, respiratory depression, somnelence , immune and endocrine dysfunction.  The patient expressed understanding.    --Continue tramadol 50 mg b.i.d. p.r.n., 60 tablets, last filled on 2/21/2025.     Patient does not report any unwanted side effects while taking Tramadol.     report:  Reviewed and consistent with medication use as prescribed.            - Therapy:  Advised patient continue with activities as tolerated    - Psychological:  Discussed coping mechanisms to help address chronic pain issues    - Labs:  Reviewed    - Imaging: Reviewed available imaging with patient and answered any questions they had regarding study.    - Consults/Referrals:  Continue follow up with Ortho/Sports Medicine:      - Records:  Reviewed/Obtain old records from outside physicians and imaging    - Follow up visit: return to clinic  2 months  or as needed, virtual visit is appropriate.    - Counseled patient regarding the importance of activity modification and physical therapy    - This condition does not require this patient to take time off of work, and the primary goal of our Pain Management services is to improve the patient's functional capacity.    - Patient Questions: Answered all of the patient's questions regarding diagnosis, therapy, and treatment        The above plan and management options were discussed at length with patient. Patient is in agreement with the above and verbalized understanding.    I  discussed the goals of interventional chronic pain management with the patient on today's visit.  I explained the utility of injections for diagnostic and therapeutic purposes.  We discussed a multimodal approach to pain including treating the patient's given worst pain at any given time.  We will use a systematic approach to addressing pain.  We will also adopt a multimodal approach that includes injections, adjuvant medications, physical therapy, at times psychiatry.  There may be a limited role for opioid use intermittently in the treatment of pain, more particularly for acute pain although no one approach can be used as a sole treatment modality.    I emphasized the importance of regular exercise, core strengthening and stretching, diet and weight loss as a cornerstone of long-term pain management.    Visit today included increased complexity associated with the care of the episodic problem of chronic pain which was addressed and continue to manage the longitudinal care of the patient due to the serious and/or complex managed problem(s) listed above.        Jessica Ball PA-C  Interventional Pain Management  [facility]

## 2025-02-27 ENCOUNTER — DOCUMENTATION ONLY (OUTPATIENT)
Dept: HEMATOLOGY/ONCOLOGY | Facility: CLINIC | Age: 75
End: 2025-02-27
Payer: MEDICARE

## 2025-02-27 VITALS
DIASTOLIC BLOOD PRESSURE: 69 MMHG | RESPIRATION RATE: 18 BRPM | TEMPERATURE: 99 F | OXYGEN SATURATION: 97 % | HEIGHT: 63 IN | WEIGHT: 155.44 LBS | SYSTOLIC BLOOD PRESSURE: 145 MMHG | BODY MASS INDEX: 27.54 KG/M2 | HEART RATE: 84 BPM

## 2025-02-27 PROBLEM — R53.83 FATIGUE: Status: ACTIVE | Noted: 2024-07-27

## 2025-02-27 LAB
ALBUMIN SERPL BCP-MCNC: 2.8 G/DL (ref 3.5–5.2)
ALP SERPL-CCNC: 78 U/L (ref 40–150)
ALT SERPL W/O P-5'-P-CCNC: 60 U/L (ref 10–44)
ANION GAP SERPL CALC-SCNC: 8 MMOL/L (ref 8–16)
AST SERPL-CCNC: 45 U/L (ref 10–40)
BASOPHILS # BLD AUTO: 0.02 K/UL (ref 0–0.2)
BASOPHILS NFR BLD: 0.5 % (ref 0–1.9)
BILIRUB SERPL-MCNC: 0.2 MG/DL (ref 0.1–1)
BILIRUB UR QL STRIP: NEGATIVE
BNP SERPL-MCNC: 27 PG/ML (ref 0–99)
BUN SERPL-MCNC: 10 MG/DL (ref 8–23)
CALCIUM SERPL-MCNC: 9.3 MG/DL (ref 8.7–10.5)
CHLORIDE SERPL-SCNC: 108 MMOL/L (ref 95–110)
CLARITY UR: CLEAR
CO2 SERPL-SCNC: 23 MMOL/L (ref 23–29)
COLOR UR: COLORLESS
CREAT SERPL-MCNC: 0.6 MG/DL (ref 0.5–1.4)
DIFFERENTIAL METHOD BLD: ABNORMAL
EOSINOPHIL # BLD AUTO: 0.1 K/UL (ref 0–0.5)
EOSINOPHIL NFR BLD: 1.5 % (ref 0–8)
ERYTHROCYTE [DISTWIDTH] IN BLOOD BY AUTOMATED COUNT: 16.5 % (ref 11.5–14.5)
EST. GFR  (NO RACE VARIABLE): >60 ML/MIN/1.73 M^2
GLUCOSE SERPL-MCNC: 101 MG/DL (ref 70–110)
GLUCOSE UR QL STRIP: NEGATIVE
HCT VFR BLD AUTO: 29.6 % (ref 37–48.5)
HGB BLD-MCNC: 10 G/DL (ref 12–16)
HGB UR QL STRIP: NEGATIVE
IMM GRANULOCYTES # BLD AUTO: 0.03 K/UL (ref 0–0.04)
IMM GRANULOCYTES NFR BLD AUTO: 0.8 % (ref 0–0.5)
KETONES UR QL STRIP: NEGATIVE
LEUKOCYTE ESTERASE UR QL STRIP: NEGATIVE
LYMPHOCYTES # BLD AUTO: 0.7 K/UL (ref 1–4.8)
LYMPHOCYTES NFR BLD: 18 % (ref 18–48)
MCH RBC QN AUTO: 25.4 PG (ref 27–31)
MCHC RBC AUTO-ENTMCNC: 33.8 G/DL (ref 32–36)
MCV RBC AUTO: 75 FL (ref 82–98)
MONOCYTES # BLD AUTO: 0.3 K/UL (ref 0.3–1)
MONOCYTES NFR BLD: 8.2 % (ref 4–15)
NEUTROPHILS # BLD AUTO: 2.8 K/UL (ref 1.8–7.7)
NEUTROPHILS NFR BLD: 71 % (ref 38–73)
NITRITE UR QL STRIP: NEGATIVE
NRBC BLD-RTO: 0 /100 WBC
OHS QRS DURATION: 84 MS
OHS QTC CALCULATION: 442 MS
PH UR STRIP: 8 [PH] (ref 5–8)
PLATELET # BLD AUTO: 256 K/UL (ref 150–450)
PMV BLD AUTO: 9.2 FL (ref 9.2–12.9)
POTASSIUM SERPL-SCNC: 4.5 MMOL/L (ref 3.5–5.1)
PROT SERPL-MCNC: 7.1 G/DL (ref 6–8.4)
PROT UR QL STRIP: NEGATIVE
RBC # BLD AUTO: 3.93 M/UL (ref 4–5.4)
SODIUM SERPL-SCNC: 139 MMOL/L (ref 136–145)
SP GR UR STRIP: 1.01 (ref 1–1.03)
TROPONIN I SERPL DL<=0.01 NG/ML-MCNC: <0.006 NG/ML (ref 0–0.03)
URN SPEC COLLECT METH UR: ABNORMAL
UROBILINOGEN UR STRIP-ACNC: NEGATIVE EU/DL
WBC # BLD AUTO: 3.89 K/UL (ref 3.9–12.7)

## 2025-02-27 PROCEDURE — 93005 ELECTROCARDIOGRAM TRACING: CPT

## 2025-02-27 PROCEDURE — 85025 COMPLETE CBC W/AUTO DIFF WBC: CPT | Performed by: EMERGENCY MEDICINE

## 2025-02-27 PROCEDURE — 81003 URINALYSIS AUTO W/O SCOPE: CPT | Performed by: EMERGENCY MEDICINE

## 2025-02-27 PROCEDURE — 83880 ASSAY OF NATRIURETIC PEPTIDE: CPT | Performed by: EMERGENCY MEDICINE

## 2025-02-27 PROCEDURE — 93010 ELECTROCARDIOGRAM REPORT: CPT | Mod: ,,, | Performed by: INTERNAL MEDICINE

## 2025-02-27 PROCEDURE — 84484 ASSAY OF TROPONIN QUANT: CPT | Performed by: EMERGENCY MEDICINE

## 2025-02-27 PROCEDURE — 80053 COMPREHEN METABOLIC PANEL: CPT | Performed by: EMERGENCY MEDICINE

## 2025-02-27 NOTE — PROGRESS NOTES
[facility] emailed the [facility] referral form to [email or web address]. [facility] will reach out to pt re: referral. SW will remain available.

## 2025-02-27 NOTE — ED PROVIDER NOTES
SCRIBE #1 NOTE: I, Kam Martinez, am scribing for, and in the presence of, Estevan Fernandez Jr., MD. I have scribed the entire note.       History     Chief Complaint   Patient presents with    Altered Mental Status     Patient found trying to get into the cancer center, believes it is 9am and time for her infusion. poor historian on whether she came from [place] or [place], claims to live alone, states does not have anyone to pick her up, asking for lunch and coffee shop. Denies any other complaints.      Review of patient's allergies indicates:  No Known Allergies      History of Present Illness     HPI    2/27/2025, 12:10 AM  History obtained from the patient and medical records      History of Present Illness: Cassandra Campos is a 74 y.o. female patient with a PMHx of gastritis, arthritis, HTN, HLD, obesity, insomnia, migraines, SVT, DMII, pneumonia, De Quervain's disease, and sleep apnea who presents to the Emergency Department for evaluation of AMS noted PTA. Pt has driven from [place], [place] starting at an unknown time for her chemo appointment at [facility] (pt has an hx of lung cancer) which was scheduled for 8:00 AM. Pt sees Dr Moran for Hem/Onc. Pt's last round of chemo was 1 week ago. Symptoms are intermittent and moderate in severity. No mitigating or exacerbating factors reported. Associated sxs include cough. Patient denies any chills, congestion, SOB, or CP. Pt denies any falls or trauma. No prior Tx specified.  No further complaints or concerns at this time.       Arrival mode: Personal Transportation    PCP: Emil Zhang MD        Past Medical History:  Past Medical History:   Diagnosis Date    Arthritis     hands    Bilateral bunions     Borderline glaucoma     De Quervain's disease (radial styloid tenosynovitis)     Gastritis     upper GI 2/2017    Hydradenitis     Hyperlipidemia     Hypertension     Insomnia     Lung cancer     Migraines 02/01/2000    Nasal septum  perforation     Obesity     Pneumonia     Restrictive airway disease     Sleep apnea     SVT (supraventricular tachycardia) 2013    Trigger finger     Type 2 diabetes mellitus      am 2024       Past Surgical History:  Past Surgical History:   Procedure Laterality Date    AXILLARY HIDRADENITIS EXCISION Bilateral     BONE EXOSTOSIS EXCISION Right 2018    Procedure: EXCISION, EXOSTOSIS;  Surgeon: Jayro Pedraza Sr., MD;  Location: [facility];  Service: Orthopedics;  Laterality: Right;    BREAST BIOPSY Bilateral     both benign    BREAST SURGERY  1998    BRONCHOSCOPY Bilateral 1/15/2025    Procedure: Bronchoscopy - insert lighted tube into airway to take a biopsy of lung;  Surgeon: Adrian Robin MD;  Location: [facility];  Service: Pulmonary;  Laterality: Bilateral;    CARPAL TUNNEL RELEASE      bilateral    CARPAL TUNNEL RELEASE Right 2024    Procedure: RELEASE, CARPAL TUNNEL;  Surgeon: Jaime Oswald MD;  Location: [facility];  Service: Orthopedics;  Laterality: Right;    CARPAL TUNNEL RELEASE Left 2024    Procedure: RELEASE, CARPAL TUNNEL;  Surgeon: Jaime Oswald MD;  Location: [facility];  Service: Orthopedics;  Laterality: Left;    CATARACT EXTRACTION Bilateral     OU     SECTION  1979    CHOLECYSTECTOMY  2014    COLONOSCOPY N/A 10/02/2020    Procedure: COLONOSCOPY;  Surgeon: Tushar Edwards MD;  Location: [facility];  Service: Endoscopy;  Laterality: N/A;    COLONOSCOPY W/ POLYPECTOMY  10/02/2020    Polyps x3, repeat 5 years; Tushar Edwards MD     CYST REMOVAL  2015    sebaceous cyst removed from face    DE QUERVAIN'S RELEASE Left 2020    Procedure: RELEASE, HAND, FOR DEQUERVAIN'S TENOSYNOVITIS;  Surgeon: Jaime Oswald MD;  Location: [facility];  Service: Orthopedics;  Laterality: Left;    DE QUERVAIN'S RELEASE Right 2020    Procedure: RELEASE, HAND, FOR DEQUERVAIN'S TENOSYNOVITIS;  Surgeon: Jaime Oswald MD;   Location: [facility];  Service: Orthopedics;  Laterality: Right;    ENDOBRONCHIAL ULTRASOUND Bilateral 04/10/2024    Procedure: ENDOBRONCHIAL ULTRASOUND (EBUS);  Surgeon: Adrian Robin MD;  Location: [facility];  Service: Pulmonary;  Laterality: Bilateral;    ENDOBRONCHIAL ULTRASOUND Bilateral 1/15/2025    Procedure: ENDOBRONCHIAL ULTRASOUND (EBUS);  Surgeon: Adrian Robin MD;  Location: [facility];  Service: Pulmonary;  Laterality: Bilateral;    EYE SURGERY      gastric sleeve  02/13/2017    Dr. Watson    INJECTION OF ANESTHETIC AGENT AROUND MULTIPLE INTERCOSTAL NERVES  5/10/2024    Procedure: BLOCK, NERVE, INTERCOSTAL, 2 OR MORE;  Surgeon: Mike Nuñez MD;  Location: [facility] OR;  Service: Cardiothoracic;;    KNEE SURGERY Right     OLECRANON BURSECTOMY Right 07/25/2018    Procedure: BURSECTOMY, OLECRANON;  Surgeon: Jayro Pedraza Sr., MD;  Location: [facility] OR;  Service: Orthopedics;  Laterality: Right;    SURGICAL REMOVAL OF BUNION WITH OSTEOTOMY OF METATARSAL BONE Left 05/10/2019    Procedure: BUNIONECTOMY, WITH METATARSAL OSTEOTOMY;  Surgeon: Srinivasan Villanueva DPM;  Location: [facility] OR;  Service: Podiatry;  Laterality: Left;    SURGICAL REMOVAL OF BUNION WITH OSTEOTOMY OF METATARSAL BONE Right 06/28/2019    Procedure: BUNIONECTOMY, WITH METATARSAL OSTEOTOMY;  Surgeon: Srinivasan Villanueva DPM;  Location: [facility] OR;  Service: Podiatry;  Laterality: Right;    SURGICAL REMOVAL OF LYMPH NODE Left 5/10/2024    Procedure: EXCISION, LYMPH NODE;  Surgeon: Mike Nuñez MD;  Location: [facility] OR;  Service: Cardiothoracic;  Laterality: Left;    TONSILLECTOMY, ADENOIDECTOMY  1980s    TRANSESOPHAGEAL ECHOCARDIOGRAM WITH POSSIBLE CARDIOVERSION (LAKESHIA W/ POSS CARDIOVERSION) N/A 5/15/2024    Procedure: Transesophageal echo (LAKESHIA) intra-procedure log documentation;  Surgeon: Twan Pelaez MD;  Location: [facility] CATH LAB;  Service: Cardiology;  Laterality: N/A;    TRIGGER FINGER RELEASE Right 04/01/2015    Dr. Milo MADSEN  RATS,WITH LOBECTOMY,LUNG Left 5/10/2024    Procedure: XI ROBOTIC RATS,WITH LOBECTOMY,LUNG;  Surgeon: Mike Nuñez MD;  Location: [facility];  Service: Cardiothoracic;  Laterality: Left;  Lingulectomy         Family History:  Family History   Problem Relation Name Age of Onset    Prostate cancer Brother      Diabetes Maternal Aunt Alondra Campos     Diabetes Cousin      Hypertension Maternal Grandmother Portia Orosco        Social History:  Social History     Tobacco Use    Smoking status: Former     Current packs/day: 0.00     Average packs/day: 0.5 packs/day for 42.0 years (21.0 ttl pk-yrs)     Types: Cigarettes     Start date: 1970     Quit date: 2012     Years since quittin.2     Passive exposure: Past    Smokeless tobacco: Never   Substance and Sexual Activity    Alcohol use: Not Currently     Alcohol/week: 1.0 standard drink of alcohol     Types: 1 Glasses of wine per week     Comment: Glass red wine once a every 2 weeks    Drug use: Never    Sexual activity: Not Currently     Partners: Female     Birth control/protection: Abstinence, None        Review of Systems     Review of Systems   Constitutional:  Negative for chills and fever.   HENT:  Negative for congestion and sore throat.    Respiratory:  Positive for cough. Negative for shortness of breath.    Cardiovascular:  Negative for chest pain.   Gastrointestinal:  Negative for nausea.   Genitourinary:  Negative for dysuria.   Musculoskeletal:  Negative for back pain.   Skin:  Negative for rash.   Neurological:  Negative for weakness.   Hematological:  Does not bruise/bleed easily.   All other systems reviewed and are negative.       Physical Exam     Initial Vitals   BP Pulse Resp Temp SpO2   25 2346 25 2346 25 2346 25 2332 25 2346   113/71 88 16 98.5 °F (36.9 °C) 97 %      MAP       --                 Physical Exam  Nursing Notes and Vital Signs Reviewed.  Constitutional: Patient is in no acute distress.  "Well-developed and well-nourished.  Head: Atraumatic. Normocephalic.  Eyes: PERRL. EOM intact. Conjunctivae are not pale. No scleral icterus.  ENT: Mucous membranes are moist. Oropharynx is clear and symmetric.    Neck: Supple. Full ROM. No lymphadenopathy.  Cardiovascular: Regular rate. Regular rhythm. No murmurs, rubs, or gallops. Distal pulses are 2+ and symmetric.  Pulmonary/Chest: No respiratory distress. Clear to auscultation bilaterally. No wheezing or rales.  Abdominal: Soft and non-distended.  There is no tenderness.  No rebound, guarding, or rigidity. Good bowel sounds.  Genitourinary: No CVA tenderness.  Musculoskeletal: Moves all extremities. No obvious deformities. No edema. No calf tenderness.  Skin: Warm and dry.  Neurological:  Alert, awake, and appropriate.  Normal speech.  No acute focal neurological deficits are appreciated.  Psychiatric: Normal affect. Good eye contact. Appropriate in content.     ED Course   Procedures  ED Vital Signs:  Vitals:    02/26/25 2332 02/26/25 2346 02/27/25 0005 02/27/25 0100   BP:  113/71 109/66 125/71   Pulse:  88 84 82   Resp:  16 17 18   Temp: 98.5 °F (36.9 °C)      TempSrc: Oral      SpO2:  97% 95% 97%   Weight:  70.5 kg (155 lb 6.8 oz)     Height:  5' 3" (1.6 m)      02/27/25 0212   BP: (!) 145/69   Pulse: 84   Resp: 18   Temp:    TempSrc:    SpO2: 97%   Weight:    Height:        Abnormal Lab Results:  Labs Reviewed   URINALYSIS, REFLEX TO URINE CULTURE - Abnormal       Result Value    Specimen UA Urine, Clean Catch      Color, UA Colorless (*)     Appearance, UA Clear      pH, UA 8.0      Specific Gravity, UA 1.010      Protein, UA Negative      Glucose, UA Negative      Ketones, UA Negative      Bilirubin (UA) Negative      Occult Blood UA Negative      Nitrite, UA Negative      Urobilinogen, UA Negative      Leukocytes, UA Negative      Narrative:     Specimen Source->Urine   CBC W/ AUTO DIFFERENTIAL - Abnormal    WBC 3.89 (*)     RBC 3.93 (*)     Hemoglobin " 10.0 (*)     Hematocrit 29.6 (*)     MCV 75 (*)     MCH 25.4 (*)     MCHC 33.8      RDW 16.5 (*)     Platelets 256      MPV 9.2      Immature Granulocytes 0.8 (*)     Gran # (ANC) 2.8      Immature Grans (Abs) 0.03      Lymph # 0.7 (*)     Mono # 0.3      Eos # 0.1      Baso # 0.02      nRBC 0      Gran % 71.0      Lymph % 18.0      Mono % 8.2      Eosinophil % 1.5      Basophil % 0.5      Differential Method Automated     COMPREHENSIVE METABOLIC PANEL - Abnormal    Sodium 139      Potassium 4.5      Chloride 108      CO2 23      Glucose 101      BUN 10      Creatinine 0.6      Calcium 9.3      Total Protein 7.1      Albumin 2.8 (*)     Total Bilirubin 0.2      Alkaline Phosphatase 78      AST 45 (*)     ALT 60 (*)     eGFR >60      Anion Gap 8     TROPONIN I    Troponin I <0.006     B-TYPE NATRIURETIC PEPTIDE    BNP 27          All Lab Results:  Results for orders placed or performed during the hospital encounter of 02/26/25   CBC auto differential    Collection Time: 02/27/25 12:36 AM   Result Value Ref Range    WBC 3.89 (L) 3.90 - 12.70 K/uL    RBC 3.93 (L) 4.00 - 5.40 M/uL    Hemoglobin 10.0 (L) 12.0 - 16.0 g/dL    Hematocrit 29.6 (L) 37.0 - 48.5 %    MCV 75 (L) 82 - 98 fL    MCH 25.4 (L) 27.0 - 31.0 pg    MCHC 33.8 32.0 - 36.0 g/dL    RDW 16.5 (H) 11.5 - 14.5 %    Platelets 256 150 - 450 K/uL    MPV 9.2 9.2 - 12.9 fL    Immature Granulocytes 0.8 (H) 0.0 - 0.5 %    Gran # (ANC) 2.8 1.8 - 7.7 K/uL    Immature Grans (Abs) 0.03 0.00 - 0.04 K/uL    Lymph # 0.7 (L) 1.0 - 4.8 K/uL    Mono # 0.3 0.3 - 1.0 K/uL    Eos # 0.1 0.0 - 0.5 K/uL    Baso # 0.02 0.00 - 0.20 K/uL    nRBC 0 0 /100 WBC    Gran % 71.0 38.0 - 73.0 %    Lymph % 18.0 18.0 - 48.0 %    Mono % 8.2 4.0 - 15.0 %    Eosinophil % 1.5 0.0 - 8.0 %    Basophil % 0.5 0.0 - 1.9 %    Differential Method Automated    Comprehensive metabolic panel    Collection Time: 02/27/25 12:36 AM   Result Value Ref Range    Sodium 139 136 - 145 mmol/L    Potassium 4.5 3.5 - 5.1  mmol/L    Chloride 108 95 - 110 mmol/L    CO2 23 23 - 29 mmol/L    Glucose 101 70 - 110 mg/dL    BUN 10 8 - 23 mg/dL    Creatinine 0.6 0.5 - 1.4 mg/dL    Calcium 9.3 8.7 - 10.5 mg/dL    Total Protein 7.1 6.0 - 8.4 g/dL    Albumin 2.8 (L) 3.5 - 5.2 g/dL    Total Bilirubin 0.2 0.1 - 1.0 mg/dL    Alkaline Phosphatase 78 40 - 150 U/L    AST 45 (H) 10 - 40 U/L    ALT 60 (H) 10 - 44 U/L    eGFR >60 >60 mL/min/1.73 m^2    Anion Gap 8 8 - 16 mmol/L   Troponin I #1    Collection Time: 02/27/25 12:36 AM   Result Value Ref Range    Troponin I <0.006 0.000 - 0.026 ng/mL   BNP    Collection Time: 02/27/25 12:36 AM   Result Value Ref Range    BNP 27 0 - 99 pg/mL   Urinalysis, Reflex to Urine Culture Urine, Clean Catch    Collection Time: 02/27/25 12:37 AM    Specimen: Urine   Result Value Ref Range    Specimen UA Urine, Clean Catch     Color, UA Colorless (A) Yellow, Straw, Yoon    Appearance, UA Clear Clear    pH, UA 8.0 5.0 - 8.0    Specific Gravity, UA 1.010 1.005 - 1.030    Protein, UA Negative Negative    Glucose, UA Negative Negative    Ketones, UA Negative Negative    Bilirubin (UA) Negative Negative    Occult Blood UA Negative Negative    Nitrite, UA Negative Negative    Urobilinogen, UA Negative <2.0 EU/dL    Leukocytes, UA Negative Negative   EKG 12-lead    Collection Time: 02/27/25  1:03 AM   Result Value Ref Range    QRS Duration 84 ms    [facility] QTC Calculation 442 ms     *Note: Due to a large number of results and/or encounters for the requested time period, some results have not been displayed. A complete set of results can be found in Results Review.       Imaging Results:  Imaging Results              CT Head Without Contrast (Final result)  Result time 02/27/25 06:42:34      Final result by Sergei Mcclure MD (02/27/25 06:42:34)                   Impression:      Chronic microvascular ischemic changes.      Electronically signed by: Sergei Mcclure MD  Date:    02/27/2025  Time:    06:42               Narrative:     EXAMINATION:  CT HEAD WITHOUT CONTRAST    CLINICAL HISTORY:  Dizziness, persistent/recurrent, cardiac or vascular cause suspected; Other fatigue    TECHNIQUE:  Low dose axial CT images obtained throughout the head without intravenous contrast. Sagittal and coronal reconstructions were performed.  All CT scans at this facility use dose modulation, iterative reconstruction and/or weight based dosing when appropriate to reduce radiation dose to as low as reasonably achievable.    COMPARISON:  07/27/2020    FINDINGS:  Intracranial compartment:    The brain parenchyma demonstrates areas of decreased attenuation with mild to moderate periventricular white matter consistent with chronic microvascular ischemic changes..  No parenchymal mass, hemorrhage, edema or major vascular distribution infarct.  Vascular calcifications are noted.    Mild prominence of the sulci and ventricles are consistent with age-related involutional changes.    No extra-axial blood or fluid collections.    Skull/extracranial contents (limited evaluation): No fracture.  Left maxillary sinus mucosal thickening.  Small right mastoid effusion stable from prior.  Left mastoid air cells and paranasal sinuses are essentially clear.                                       X-Ray Chest AP Portable (Final result)  Result time 02/27/25 06:52:23      Final result by Sergei Mcclure MD (02/27/25 06:52:23)                   Impression:      No acute process seen.      Electronically signed by: Sergei Mcclure MD  Date:    02/27/2025  Time:    06:52               Narrative:    EXAMINATION:  XR CHEST AP PORTABLE    CLINICAL HISTORY:  fatigue;    FINDINGS:  Single view of the chest.  Comparison 09/22/2024    Cardiac silhouette is normal.  The lungs demonstrate no evidence of active disease.  Mild atelectasis lung bases.  Low lung volumes limits evaluation.  No evidence of pleural effusion or pneumothorax.  Bones appear intact.  Moderate degenerative changes and  moderate atherosclerotic disease.                                     Type of Interpretation: Outside Written Report.  Radiology Procedure Done: Head CT without contrast.  Interpretation: Radiologist Dasha Brown MD    No acute hemorrhage, hydrocephalus, or mass affect  Mild right mastoid effusion          The EKG was ordered, reviewed, and independently interpreted by the ED provider.  Interpretation time: 01:03 AM  Rate: 94 BPM  Rhythm: normal sinus rhythm  Interpretation: Left axis deviation. Cannot rule out anterior infarct, age undetermined. No STEMI.             The Emergency Provider reviewed the vital signs and test results, which are outlined above.     ED Discussion     1:52 AM: Reassessed pt at this time. Discussed with patient and/or family/caretaker all pertinent ED information and results. Discussed pt dx and plan of tx. Gave the patient all f/u and return to the ED instructions. All questions and concerns were addressed at this time. Patient and/or family/caretaker expresses understanding of information and instructions, and is comfortable with plan to discharge. Pt is stable for discharge.     I discussed with patient and/or family/caretaker that evaluation in the ED does not suggest any emergent or life threatening medical conditions requiring immediate intervention beyond what was provided in the ED, and I believe patient is safe for discharge.  Regardless, an unremarkable evaluation in the ED does not preclude the development or presence of a serious of life threatening condition. As such, I instructed that the patient is to return immediately for any worsening or change in current symptoms.        ED Course as of 03/11/25 1938   Thu Feb 27, 2025   0116 Rhythm strip reviewed. Nsr, no stemi. 94 bpm. [LV]      ED Course User Index  [LV] Estevan Fernandez Jr., MD     Medical Decision Making  Amount and/or Complexity of Data Reviewed  Labs: ordered. Decision-making details documented in ED  Course.  Radiology: ordered. Decision-making details documented in ED Course.  ECG/medicine tests: ordered and independent interpretation performed. Decision-making details documented in ED Course.    Risk  OTC drugs.  Prescription drug management.  Parenteral controlled substances.  Risk Details: OTC drugs, prescription drugs and controlled substances considered.  Due to patient's symptoms improving and pain controlled pain medications ordered appropriately.  Differential diagnosis: Dehydration, electrolyte abnormality, arrhythmia, infection, STEMI, NSTEMI, UTI                  ED Medication(s):  Medications - No data to display    Discharge Medication List as of 2/27/2025  1:52 AM           Follow-up Information       Emil Zhang MD. Schedule an appointment as soon as possible for a visit in 1 week.    Specialty: Family Medicine  Contact information:  [address] [facility] [id number] [phone]               [facility] - Emergency Dept..    Specialty: Emergency Medicine  Why: As needed, If symptoms worsen  Contact information:  [address] [facility] [id number] [phone]                               Scribe Attestation:   Scribe #1: I performed the above scribed service and the documentation accurately describes the services I performed. I attest to the accuracy of the note.     Attending:   Physician Attestation Statement for Scribe #1: I, Estevan Fernandez Jr., MD, personally performed the services described in this documentation, as scribed by Kam Martinez, in my presence, and it is both accurate and complete.           Clinical Impression       ICD-10-CM ICD-9-CM   1. Fatigue  R53.83 780.79       Disposition:   Disposition: Discharged  Condition: Stable        Estevan Fernandez Jr., MD  03/11/25 1938

## 2025-02-28 ENCOUNTER — RESULTS FOLLOW-UP (OUTPATIENT)
Dept: HEMATOLOGY/ONCOLOGY | Facility: CLINIC | Age: 75
End: 2025-02-28

## 2025-02-28 ENCOUNTER — OFFICE VISIT (OUTPATIENT)
Dept: HEMATOLOGY/ONCOLOGY | Facility: CLINIC | Age: 75
End: 2025-02-28
Payer: MEDICARE

## 2025-02-28 ENCOUNTER — RESULTS FOLLOW-UP (OUTPATIENT)
Dept: SLEEP MEDICINE | Facility: CLINIC | Age: 75
End: 2025-02-28

## 2025-02-28 ENCOUNTER — INFUSION (OUTPATIENT)
Dept: INFUSION THERAPY | Facility: HOSPITAL | Age: 75
End: 2025-02-28
Attending: INTERNAL MEDICINE
Payer: MEDICARE

## 2025-02-28 ENCOUNTER — LAB VISIT (OUTPATIENT)
Dept: LAB | Facility: HOSPITAL | Age: 75
End: 2025-02-28
Attending: INTERNAL MEDICINE
Payer: MEDICARE

## 2025-02-28 VITALS
BODY MASS INDEX: 27.11 KG/M2 | WEIGHT: 153 LBS | HEIGHT: 63 IN | SYSTOLIC BLOOD PRESSURE: 139 MMHG | RESPIRATION RATE: 16 BRPM | OXYGEN SATURATION: 95 % | DIASTOLIC BLOOD PRESSURE: 74 MMHG | HEART RATE: 86 BPM | TEMPERATURE: 98 F

## 2025-02-28 VITALS
SYSTOLIC BLOOD PRESSURE: 109 MMHG | OXYGEN SATURATION: 97 % | HEIGHT: 63 IN | DIASTOLIC BLOOD PRESSURE: 70 MMHG | TEMPERATURE: 98 F | WEIGHT: 153 LBS | BODY MASS INDEX: 27.11 KG/M2
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
## 2025-02-28 DIAGNOSIS — C34.92 ADENOCARCINOMA OF LEFT LUNG: ICD-10-CM

## 2025-02-28 DIAGNOSIS — C34.32 MALIGNANT NEOPLASM OF LOWER LOBE OF LEFT LUNG: Primary | ICD-10-CM

## 2025-02-28 DIAGNOSIS — D84.821 IMMUNODEFICIENCY DUE TO CHEMOTHERAPY: Primary | ICD-10-CM

## 2025-02-28 DIAGNOSIS — C34.32 MALIGNANT NEOPLASM OF LOWER LOBE OF LEFT LUNG: ICD-10-CM

## 2025-02-28 DIAGNOSIS — D84.822 IMMUNODEFICIENCY SECONDARY TO RADIATION THERAPY: ICD-10-CM

## 2025-02-28 DIAGNOSIS — Z79.899 IMMUNODEFICIENCY DUE TO CHEMOTHERAPY: Primary | ICD-10-CM

## 2025-02-28 DIAGNOSIS — T45.1X5A IMMUNODEFICIENCY DUE TO CHEMOTHERAPY: Primary | ICD-10-CM

## 2025-02-28 LAB
ALBUMIN SERPL BCP-MCNC: 3 G/DL (ref 3.5–5.2)
ALP SERPL-CCNC: 79 U/L (ref 40–150)
ALT SERPL W/O P-5'-P-CCNC: 64 U/L (ref 10–44)
ANION GAP SERPL CALC-SCNC: 7 MMOL/L (ref 8–16)
AST SERPL-CCNC: 46 U/L (ref 10–40)
BASOPHILS # BLD AUTO: 0.02 K/UL (ref 0–0.2)
BASOPHILS NFR BLD: 0.5 % (ref 0–1.9)
BILIRUB SERPL-MCNC: 0.2 MG/DL (ref 0.1–1)
BUN SERPL-MCNC: 15 MG/DL (ref 8–23)
CALCIUM SERPL-MCNC: 9.5 MG/DL (ref 8.7–10.5)
CHLORIDE SERPL-SCNC: 106 MMOL/L (ref 95–110)
CO2 SERPL-SCNC: 25 MMOL/L (ref 23–29)
CREAT SERPL-MCNC: 0.6 MG/DL (ref 0.5–1.4)
DIFFERENTIAL METHOD BLD: ABNORMAL
EOSINOPHIL # BLD AUTO: 0.1 K/UL (ref 0–0.5)
EOSINOPHIL NFR BLD: 1.7 % (ref 0–8)
ERYTHROCYTE [DISTWIDTH] IN BLOOD BY AUTOMATED COUNT: 16.7 % (ref 11.5–14.5)
EST. GFR  (NO RACE VARIABLE): >60 ML/MIN/1.73 M^2
GLUCOSE SERPL-MCNC: 96 MG/DL (ref 70–110)
HCT VFR BLD AUTO: 31.3 % (ref 37–48.5)
HGB BLD-MCNC: 10.5 G/DL (ref 12–16)
IMM GRANULOCYTES # BLD AUTO: 0.03 K/UL (ref 0–0.04)
IMM GRANULOCYTES NFR BLD AUTO: 0.7 % (ref 0–0.5)
LYMPHOCYTES # BLD AUTO: 1 K/UL (ref 1–4.8)
LYMPHOCYTES NFR BLD: 24.3 % (ref 18–48)
MAGNESIUM SERPL-MCNC: 1.8 MG/DL (ref 1.6–2.6)
MCH RBC QN AUTO: 25.5 PG (ref 27–31)
MCHC RBC AUTO-ENTMCNC: 33.5 G/DL (ref 32–36)
MCV RBC AUTO: 76 FL (ref 82–98)
MONOCYTES # BLD AUTO: 0.4 K/UL (ref 0.3–1)
MONOCYTES NFR BLD: 8.5 % (ref 4–15)
NEUTROPHILS # BLD AUTO: 2.7 K/UL (ref 1.8–7.7)
NEUTROPHILS NFR BLD: 64.3 % (ref 38–73)
NRBC BLD-RTO: 0 /100 WBC
PLATELET # BLD AUTO: 257 K/UL (ref 150–450)
PMV BLD AUTO: 8.6 FL (ref 9.2–12.9)
POTASSIUM SERPL-SCNC: 3.9 MMOL/L (ref 3.5–5.1)
PROT SERPL-MCNC: 7.3 G/DL (ref 6–8.4)
RBC # BLD AUTO: 4.12 M/UL (ref 4–5.4)
SODIUM SERPL-SCNC: 138 MMOL/L (ref 136–145)
WBC # BLD AUTO: 4.12 K/UL (ref 3.9–12.7)

## 2025-02-28 PROCEDURE — 80053 COMPREHEN METABOLIC PANEL: CPT | Performed by: INTERNAL MEDICINE

## 2025-02-28 PROCEDURE — 96367 TX/PROPH/DG ADDL SEQ IV INF: CPT

## 2025-02-28 PROCEDURE — 96375 TX/PRO/DX INJ NEW DRUG ADDON: CPT

## 2025-02-28 PROCEDURE — 85025 COMPLETE CBC W/AUTO DIFF WBC: CPT | Performed by: INTERNAL MEDICINE

## 2025-02-28 PROCEDURE — 99999 PR PBB SHADOW E&M-EST. PATIENT-LVL IV: CPT | Mod: PBBFAC,,, | Performed by: NURSE PRACTITIONER

## 2025-02-28 PROCEDURE — 63600175 PHARM REV CODE 636 W HCPCS: Performed by: NURSE PRACTITIONER

## 2025-02-28 PROCEDURE — 36415 COLL VENOUS BLD VENIPUNCTURE: CPT | Performed by: INTERNAL MEDICINE

## 2025-02-28 PROCEDURE — 96417 CHEMO IV INFUS EACH ADDL SEQ: CPT

## 2025-02-28 PROCEDURE — 96413 CHEMO IV INFUSION 1 HR: CPT

## 2025-02-28 PROCEDURE — 83735 ASSAY OF MAGNESIUM: CPT | Performed by: INTERNAL MEDICINE

## 2025-02-28 PROCEDURE — 25000003 PHARM REV CODE 250: Performed by: NURSE PRACTITIONER

## 2025-02-28 RX ORDER — HEPARIN 100 UNIT/ML
500 SYRINGE INTRAVENOUS
Status: CANCELLED | OUTPATIENT
Start: 2025-02-28

## 2025-02-28 RX ORDER — PROCHLORPERAZINE EDISYLATE 5 MG/ML
5 INJECTION INTRAMUSCULAR; INTRAVENOUS ONCE AS NEEDED
Status: CANCELLED | OUTPATIENT
Start: 2025-02-28

## 2025-02-28 RX ORDER — DIPHENHYDRAMINE HYDROCHLORIDE 50 MG/ML
50 INJECTION INTRAMUSCULAR; INTRAVENOUS ONCE AS NEEDED
Status: CANCELLED | OUTPATIENT
Start: 2025-02-28

## 2025-02-28 RX ORDER — FAMOTIDINE 10 MG/ML
20 INJECTION INTRAVENOUS
Status: CANCELLED | OUTPATIENT
Start: 2025-02-28

## 2025-02-28 RX ORDER — SODIUM CHLORIDE 0.9 % (FLUSH) 0.9 %
10 SYRINGE (ML) INJECTION
Status: CANCELLED | OUTPATIENT
Start: 2025-02-28

## 2025-02-28 RX ORDER — FAMOTIDINE 10 MG/ML
20 INJECTION INTRAVENOUS
Status: COMPLETED | OUTPATIENT
Start: 2025-02-28 | End: 2025-02-28

## 2025-02-28 RX ORDER — EPINEPHRINE 0.3 MG/.3ML
0.3 INJECTION SUBCUTANEOUS ONCE AS NEEDED
Status: CANCELLED | OUTPATIENT
Start: 2025-02-28

## 2025-02-28 RX ADMIN — SODIUM CHLORIDE: 9 INJECTION, SOLUTION INTRAVENOUS at 12:02

## 2025-02-28 RX ADMIN — CARBOPLATIN 215 MG: 10 INJECTION, SOLUTION INTRAVENOUS at 02:02

## 2025-02-28 RX ADMIN — SODIUM CHLORIDE 50 MG: 9 INJECTION, SOLUTION INTRAVENOUS at 12:02

## 2025-02-28 RX ADMIN — FAMOTIDINE 20 MG: 10 INJECTION, SOLUTION INTRAVENOUS at 12:02

## 2025-02-28 RX ADMIN — PACLITAXEL 78 MG: 6 INJECTION, SOLUTION INTRAVENOUS at 01:02

## 2025-02-28 RX ADMIN — SODIUM CHLORIDE 0.25 MG: 9 INJECTION, SOLUTION INTRAVENOUS at 12:02

## 2025-02-28 NOTE — DISCHARGE INSTRUCTIONS
.[facility]  [address] [facility]  [address] [address]  [phone] phone     [phone] fax  Hours of Operation: Monday- Friday 8:00am- 5:00pm  After hours phone  [phone]  Hematology / Oncology Physicians on call    Dr. Dean Lopes           Nurse Practitioners:     Camelia Turner, NAKIA Farrell, POLO Sterling, NAKIA Mehta, NAKIA Ceballos, NP    Please don't hesitate to call if you have any concerns.      FALL PREVENTION   Falls often occur due to slipping, tripping or losing your balance. Here are ways to reduce your risk of falling again.   Was there anything that caused your fall that can be fixed, removed or replaced?   Make your home safe by keeping walkways clear of objects you may trip over.   Use non-slip pads under rugs.   Do not walk in poorly lit areas.   Do not stand on chairs or wobbly ladders.   Use caution when reaching overhead or looking upward. This position can cause a loss of balance.   Be sure your shoes fit properly, have non-slip bottoms and are in good condition.   Be cautious when going up and down stairs, curbs, and when walking on uneven sidewalks.   If your balance is poor, consider using a cane or walker.   If your fall was related to alcohol use, stop or limit alcohol intake.   If your fall was related to use of sleeping medicines, talk to your doctor about this. You may need to reduce your dosage at bedtime if you awaken during the night to go to the bathroom.   To reduce the need for nighttime bathroom trips:   Avoid drinking fluids for several hours before going to bed   Empty your bladder before going to bed   Men can keep a urinal at the bedside   © 2639-0169 [employer] Grier, [address] [place] [address], [place] [address]. All rights reserved. This information is not intended as a substitute for professional medical care. Always follow your healthcare  professional's instructions.    WAYS TO HELP PREVENT INFECTION        WASH YOUR HANDS OFTEN DURING THE DAY, ESPECIALLY BEFORE YOU EAT, AFTER USING THE BATHROOM, AND AFTER TOUCHING ANIMALS    STAY AWAY FROM PEOPLE WHO HAVE ILLNESSES YOU CAN CATCH; SUCH AS COLDS, FLU, CHICKEN POX    TRY TO AVOID CROWDS    STAY AWAY FROM CHILDREN WHO RECENTLY HAVE RECEIVED LIVE VIRUS VACCINES    MAINTAIN GOOD MOUTH CARE    DO NOT SQUEEZE OR SCRATCH PIMPLES    CLEAN CUTS & SCRAPES RIGHT AWAY AND DAILY UNTIL HEALED WITH WARM WATER, SOAP & AN ANTISEPTIC    AVOID CONTACT WITH LITTER BOXES, BIRD CAGES, & FISH TANKS    AVOID STANDING WATER, IE., BIRD BATHS, FLOWER POTS/VASES, OR HUMIDIFIERS    WEAR GLOVES WHEN GARDENING OR CLEANING UP AFTER OTHERS, ESPECIALLY BABIES & SMALL CHILDREN    DO NOT EAT RAW FISH, SEAFOOD, MEAT, OR EGGS

## 2025-02-28 NOTE — ASSESSMENT & PLAN NOTE
Cancer Staging   Malignant neoplasm of lower lobe of left lung  Staging form: Lung, AJCC 8th Edition  - Clinical stage from 6/28/2024: Stage IA2 (cT1b, cN0, cM0) - Signed by Jaime Gallagher MD on 6/28/2024    Labs reviewed stable. Patient denies clinical concerns    Proceed with C2D1 Carboplatin/Taxol. Radiation per Rad/Onc. F/u 1 week with repeat labs for cycle 3 chemo

## 2025-02-28 NOTE — PROGRESS NOTES
Subjective:       Patient ID: Cassandra Campos is a 74 y.o. female.    Chief Complaint: Review labs. Chemo      Cancer Staging   Malignant neoplasm of lower lobe of left lung  Staging form: Lung, AJCC 8th Edition  - Clinical stage from 6/28/2024: Stage IA2 (cT1b, cN0, cM0) - Signed by Jaime Gallagher MD on 6/28/2024      HPI: 74 ylo female prsenting today for follow up of her lung cancer currently being treated with weekly Carboplatin/Taxol + radiation. She notes tolerating treatments well without any significant side effects. She notes feeling well. Denies clinical concerns.     Social History[1]    Past Medical History:   Diagnosis Date    Arthritis     hands    Bilateral bunions     Borderline glaucoma     De Quervain's disease (radial styloid tenosynovitis)     Gastritis     upper GI 2/2017    Hydradenitis     Hyperlipidemia     Hypertension     Insomnia     Lung cancer     Migraines 02/01/2000    Nasal septum perforation     Obesity     Pneumonia     Restrictive airway disease     Sleep apnea     SVT (supraventricular tachycardia) 09/2013    Trigger finger     Type 2 diabetes mellitus 2012     am 02/01/2024       Family History   Problem Relation Name Age of Onset    Prostate cancer Brother      Diabetes Maternal Aunt Alondra Campos     Diabetes Cousin      Hypertension Maternal Grandmother Portia Orosco        Past Surgical History:   Procedure Laterality Date    AXILLARY HIDRADENITIS EXCISION Bilateral     BONE EXOSTOSIS EXCISION Right 07/25/2018    Procedure: EXCISION, EXOSTOSIS;  Surgeon: Jayro Pedraza Sr., MD;  Location: [facility];  Service: Orthopedics;  Laterality: Right;    BREAST BIOPSY Bilateral     both benign    BREAST SURGERY  07/1998    BRONCHOSCOPY Bilateral 1/15/2025    Procedure: Bronchoscopy - insert lighted tube into airway to take a biopsy of lung;  Surgeon: Adrian Robin MD;  Location: [facility];  Service: Pulmonary;  Laterality: Bilateral;     CARPAL TUNNEL RELEASE      bilateral    CARPAL TUNNEL RELEASE Right 2024    Procedure: RELEASE, CARPAL TUNNEL;  Surgeon: Jaime Oswald MD;  Location: [facility];  Service: Orthopedics;  Laterality: Right;    CARPAL TUNNEL RELEASE Left 2024    Procedure: RELEASE, CARPAL TUNNEL;  Surgeon: Jaime Oswald MD;  Location: [facility];  Service: Orthopedics;  Laterality: Left;    CATARACT EXTRACTION Bilateral     OU     SECTION  1979    CHOLECYSTECTOMY  2014    COLONOSCOPY N/A 10/02/2020    Procedure: COLONOSCOPY;  Surgeon: Tushar Edwards MD;  Location: [facility];  Service: Endoscopy;  Laterality: N/A;    COLONOSCOPY W/ POLYPECTOMY  10/02/2020    Polyps x3, repeat 5 years; Tushar Edwards MD     CYST REMOVAL  2015    sebaceous cyst removed from face    DE QUERVAIN'S RELEASE Left 2020    Procedure: RELEASE, HAND, FOR DEQUERVAIN'S TENOSYNOVITIS;  Surgeon: Jaime Oswald MD;  Location: [facility];  Service: Orthopedics;  Laterality: Left;    DE QUERVAIN'S RELEASE Right 2020    Procedure: RELEASE, HAND, FOR DEQUERVAIN'S TENOSYNOVITIS;  Surgeon: Jaime Oswald MD;  Location: [facility];  Service: Orthopedics;  Laterality: Right;    ENDOBRONCHIAL ULTRASOUND Bilateral 04/10/2024    Procedure: ENDOBRONCHIAL ULTRASOUND (EBUS);  Surgeon: Adrian Robin MD;  Location: [facility];  Service: Pulmonary;  Laterality: Bilateral;    ENDOBRONCHIAL ULTRASOUND Bilateral 1/15/2025    Procedure: ENDOBRONCHIAL ULTRASOUND (EBUS);  Surgeon: Adrian Robin MD;  Location: [facility];  Service: Pulmonary;  Laterality: Bilateral;    EYE SURGERY      gastric sleeve  2017    Dr. Watson    INJECTION OF ANESTHETIC AGENT AROUND MULTIPLE INTERCOSTAL NERVES  5/10/2024    Procedure: BLOCK, NERVE, INTERCOSTAL, 2 OR MORE;  Surgeon: Mike Nuñez MD;  Location: [facility];  Service: Cardiothoracic;;    KNEE SURGERY Right     OLECRANON BURSECTOMY Right 2018     Procedure: BURSECTOMY, OLECRANON;  Surgeon: Jayro Pedraza Sr., MD;  Location: [facility];  Service: Orthopedics;  Laterality: Right;    SURGICAL REMOVAL OF BUNION WITH OSTEOTOMY OF METATARSAL BONE Left 05/10/2019    Procedure: BUNIONECTOMY, WITH METATARSAL OSTEOTOMY;  Surgeon: Srinivasan Villanueva DPM;  Location: [facility];  Service: Podiatry;  Laterality: Left;    SURGICAL REMOVAL OF BUNION WITH OSTEOTOMY OF METATARSAL BONE Right 06/28/2019    Procedure: BUNIONECTOMY, WITH METATARSAL OSTEOTOMY;  Surgeon: Srinivasan Villanueva DPM;  Location: [facility] OR;  Service: Podiatry;  Laterality: Right;    SURGICAL REMOVAL OF LYMPH NODE Left 5/10/2024    Procedure: EXCISION, LYMPH NODE;  Surgeon: Mike Nuñez MD;  Location: [facility] OR;  Service: Cardiothoracic;  Laterality: Left;    TONSILLECTOMY, ADENOIDECTOMY  1980s    TRANSESOPHAGEAL ECHOCARDIOGRAM WITH POSSIBLE CARDIOVERSION (LAKESHIA W/ POSS CARDIOVERSION) N/A 5/15/2024    Procedure: Transesophageal echo (LAKESHIA) intra-procedure log documentation;  Surgeon: Twan Pelaez MD;  Location: [facility] CATH LAB;  Service: Cardiology;  Laterality: N/A;    TRIGGER FINGER RELEASE Right 04/01/2015    Dr. Milo HALL ROBOTIC RATS,WITH LOBECTOMY,LUNG Left 5/10/2024    Procedure: XI ROBOTIC RATS,WITH LOBECTOMY,LUNG;  Surgeon: Mike Nuñez MD;  Location: [facility] OR;  Service: Cardiothoracic;  Laterality: Left;  Lingulectomy       Review of Systems   Constitutional:  Negative for activity change, appetite change, chills, fatigue, fever and unexpected weight change.   HENT:  Negative for congestion, mouth sores, nosebleeds, sore throat, trouble swallowing and voice change.    Respiratory:  Negative for cough, chest tightness, shortness of breath and wheezing.    Cardiovascular:  Negative for chest pain and leg swelling.   Gastrointestinal:  Negative for abdominal distention, abdominal pain, blood in stool, constipation, diarrhea, nausea and vomiting.   Genitourinary:  Negative for difficulty urinating,  dysuria and hematuria.   Musculoskeletal:  Negative for arthralgias, back pain and myalgias.   Skin:  Negative for pallor, rash and wound.   Neurological:  Negative for dizziness, syncope, weakness and headaches.   Hematological:  Negative for adenopathy. Does not bruise/bleed easily.   Psychiatric/Behavioral:  The patient is not nervous/anxious.          Medication List with Changes/Refills   Current Medications    ALBUTEROL (PROVENTIL/VENTOLIN HFA) 90 MCG/ACTUATION INHALER    INHALE 2 PUFFS INTO THE LUNGS EVERY 4 HOURS AS NEEDED FOR WHEEZING OR SHORTNESS OF BREATH.    AMITRIPTYLINE (ELAVIL) 50 MG TABLET    Take 1 tablet (50 mg total) by mouth every evening.    ASPIRIN (ECOTRIN) 81 MG EC TABLET    Take 1 tablet (81 mg total) by mouth once daily.    BLOOD SUGAR DIAGNOSTIC STRP    use to test blood sugar 1-2 times a day    BLOOD-GLUCOSE METER (ACCU-CHEK GUIDE ME GLUCOSE MTR) MISC    use to check blood glucose 2 times a day    BLOOD-GLUCOSE METER (ACCU-CHEK GUIDE ME GLUCOSE MTR) MISC    To check blood glucose 1-2 times daily, to use with insurance preferred meter    ESZOPICLONE (LUNESTA) 3 MG TAB    Take 1 tablet (3 mg total) by mouth every evening.    LANCETS MISC    Use to test blood glucose 1-2 times a day, discard lancet after each use    LORAZEPAM (ATIVAN) 1 MG TABLET    Take 1 tablet (1 mg total) by mouth 2 (two) times daily.    LOSARTAN (COZAAR) 25 MG TABLET    Take 1 tablet (25 mg total) by mouth once daily.    METOPROLOL SUCCINATE (TOPROL-XL) 50 MG 24 HR TABLET    Take 1 tablet (50 mg total) by mouth once daily.    OLANZAPINE (ZYPREXA) 5 MG TABLET    Take 1 tablet (5 mg total) by mouth every evening. On days 1-3 of each chemotherapy cycle.    OMEPRAZOLE (PRILOSEC) 20 MG CAPSULE    Take 1 capsule (20 mg total) by mouth once daily.    PROCHLORPERAZINE (COMPAZINE) 5 MG TABLET    Take 1 tablet (5 mg total) by mouth every 6 (six) hours as needed for Nausea.    SEMAGLUTIDE (OZEMPIC) 2 MG/DOSE (8 MG/3 ML) PNIJ     Inject 2 mg into the skin every 7 days.    TRAMADOL (ULTRAM) 50 MG TABLET    Take 1 tablet (50 mg total) by mouth 2 (two) times a day.    TRAMADOL (ULTRAM) 50 MG TABLET    Take 1 tablet (50 mg total) by mouth every 12 (twelve) hours as needed for Pain. Greater than 7 day supply medically necessary.    TRIAMCINOLONE ACETONIDE 0.025 % LOTN    Apply small amount to affective areas twice a day  take cool showers or baths     Objective:     Vitals:    02/28/25 1107   BP: 109/70   Temp: 97.7 °F (36.5 °C)     Lab Results   Component Value Date    WBC 4.12 02/28/2025    HGB 10.5 (L) 02/28/2025    HCT 31.3 (L) 02/28/2025    MCV 76 (L) 02/28/2025     02/28/2025       BMP  Lab Results   Component Value Date     02/28/2025    K 3.9 02/28/2025     02/28/2025    CO2 25 02/28/2025    BUN 15 02/28/2025    CREATININE 0.6 02/28/2025    CALCIUM 9.5 02/28/2025    ANIONGAP 7 (L) 02/28/2025    EGFRNORACEVR >60 02/28/2025     Lab Results   Component Value Date    ALT 64 (H) 02/28/2025    AST 46 (H) 02/28/2025    ALKPHOS 79 02/28/2025    BILITOT 0.2 02/28/2025       Physical Exam  Vitals reviewed.   Constitutional:       Appearance: She is well-developed.   HENT:      Head: Normocephalic.      Right Ear: External ear normal.      Left Ear: External ear normal.      Nose: Nose normal.   Eyes:      General: Lids are normal. No scleral icterus.        Right eye: No discharge.         Left eye: No discharge.      Conjunctiva/sclera: Conjunctivae normal.   Neck:      Thyroid: No thyroid mass.   Cardiovascular:      Rate and Rhythm: Normal rate and regular rhythm.      Heart sounds: Normal heart sounds.   Pulmonary:      Effort: Pulmonary effort is normal. No respiratory distress.   Abdominal:      General: Bowel sounds are normal. There is no distension.   Genitourinary:     Comments: deferred  Musculoskeletal:         General: Normal range of motion.      Cervical back: Normal range of motion.   Skin:     General: Skin is  warm and dry.   Neurological:      Mental Status: She is alert and oriented to person, place, and time.   Psychiatric:         Speech: Speech normal.         Behavior: Behavior normal. Behavior is cooperative.         Thought Content: Thought content normal.        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 Patient denies clinical concerns    Proceed with C2D1 Carboplatin/Taxol. Radiation per Rad/Onc. F/u 1 week with repeat labs for cycle 3 chemo              Plan:     Immunodeficiency due to chemotherapy    Immunodeficiency secondary to radiation therapy    Malignant neoplasm of lower lobe of left lung    Other orders  -     palonosetron (ALOXI) 0.25 mg with Dexamethasone (DECADRON) 12 mg in NS 50 mL IVPB  -     diphenhydrAMINE (BENADRYL) 50 mg in 0.9% NaCl 50 mL IVPB  -     famotidine (PF) injection 20 mg  -     PACLitaxeL (TAXOL) 45 mg/m2 = 78 mg in 0.9% NaCl 250 mL chemo infusion  -     CARBOplatin (PARAPLATIN) 215 mg in 0.9% NaCl 271.5 mL chemo infusion  -     prochlorperazine injection Soln 5 mg  -     EPINEPHrine (EPIPEN) 0.3 mg/0.3 mL pen injection 0.3 mg  -     diphenhydrAMINE injection 50 mg  -     hydrocortisone sodium succinate injection 100 mg  -     0.9% NaCl 250 mL flush bag  -     sodium chloride 0.9% flush 10 mL  -     heparin, porcine (PF) 100 unit/mL injection flush 500 Units  -     alteplase injection 2 mg            Med Onc Chart Routing      Follow up with physician 1 week. Dr. Moran   Follow  up with [facility]    Infusion scheduling note   taxol/carboplatin   Injection scheduling note    Labs CBC, CMP and magnesium   Scheduling:  Preferred lab:  Lab interval:     Imaging None      Pharmacy appointment No pharmacy appointment needed      Other referrals       No additional referrals needed             ANNIE Mcnulty           [1]  Social History  Socioeconomic History    Marital status:     Number of children: 1   Occupational History    Occupation:  aid   Tobacco Use    Smoking status: Former     Current packs/day: 0.00     Average packs/day: 0.5 packs/day for 42.0 years (21.0 ttl pk-yrs)     Types: Cigarettes     Start date: 1970     Quit date: 2012     Years since quittin.1     Passive exposure: Past    Smokeless tobacco: Never   Substance and Sexual Activity    Alcohol use: Not Currently     Alcohol/week: 1.0 standard drink of alcohol     Types: 1 Glasses of wine per week     Comment: Glass red wine once a every 2 weeks    Drug use: Never    Sexual activity: Not Currently     Partners: Female     Birth control/protection: Abstinence, None   Social History Narrative    Single, part-time teacher. Masters degree biology.      Social Drivers of Health     Financial Resource Strain: Patient Declined (2024)    Overall Financial Resource Strain (CARDIA)     Difficulty of Paying Living Expenses: Patient declined   Food Insecurity: Patient Declined (2024)    Hunger Vital Sign     Worried About Running Out of Food in the Last Year: Patient declined     Ran Out of Food in the Last Year: Patient declined   Transportation Needs: No Transportation Needs (2023)    PRAPARE - Transportation     Lack of Transportation (Medical): No     Lack of Transportation (Non-Medical): No   Physical Activity: Insufficiently Active (2024)    Exercise Vital Sign     Days of Exercise per Week: 3 days     Minutes of Exercise per Session: 20 min   Stress:  Stress Concern Present (12/19/2024)    [employer] [place] [employer] - Occupational Stress Questionnaire     Feeling of Stress : Very much   Housing Stability: High Risk (12/19/2024)    Housing Stability Vital Sign     Unable to Pay for Housing in the Last Year: Yes

## 2025-02-28 NOTE — PLAN OF CARE
Problem: Adult Inpatient Plan of Care  Goal: Plan of Care Review  Outcome: Progressing  Flowsheets (Taken 2/28/2025 4824)  Plan of Care Reviewed With: patient  Goal: Patient-Specific Goal (Individualized)  Outcome: Progressing  Goal: Absence of Hospital-Acquired Illness or Injury  Outcome: Progressing  Goal: Optimal Comfort and Wellbeing  Outcome: Progressing  Goal: Readiness for Transition of Care  Outcome: Progressing     Problem: Chemotherapy Effects  Goal: Safety Maintained  Outcome: Progressing  Goal: Absence of Infection  Outcome: Progressing

## 2025-03-03 ENCOUNTER — HOSPITAL ENCOUNTER (OUTPATIENT)
Dept: RADIATION THERAPY | Facility: HOSPITAL | Age: 75
Discharge: HOME | End: 2025-03-03
Attending: SPECIALIST
Payer: MEDICARE

## 2025-03-03 DIAGNOSIS — C34.32 MALIGNANT NEOPLASM OF LOWER LOBE OF LEFT LUNG: ICD-10-CM

## 2025-03-03 RX ORDER — OLANZAPINE 5 MG/1
5 TABLET ORAL NIGHTLY
Qty: 12 TABLET | Refills: 1 | Status: SHIPPED | OUTPATIENT
Start: 2025-03-03

## 2025-03-04 ENCOUNTER — TELEPHONE (OUTPATIENT)
Dept: HEMATOLOGY/ONCOLOGY | Facility: CLINIC | Age: 75
End: 2025-03-04
Payer: MEDICARE

## 2025-03-04 ENCOUNTER — DOCUMENTATION ONLY (OUTPATIENT)
Dept: RADIATION ONCOLOGY | Facility: CLINIC | Age: 75
End: 2025-03-04
Payer: MEDICARE

## 2025-03-04 NOTE — TELEPHONE ENCOUNTER
Office Visit  2/28/2025  [facility] 2nd Fl  Default Flowsheet Data (all recorded)    Distress Management      Row Name 02/28/25 1105 02/21/25 1101      Distress Management Score   Distress Score 7 0 - No Distress (P)       Physical Concerns   Physical Concerns Sleep;Fatigue Sleep;Fatigue;Changes in eating (P)       Emotional Concerns   Emotional Concerns Worry or anxiety Worry or anxiety;Grief or loss;Feelings of worthlessness or being a burden (P)       Social Concerns   Social Concerns -- Relationship with children;Relationship with family members (P)       Practical Concerns   Practical Concerns -- Taking care of myself;Transportation (P)       9:28 am-SW attempted to reach pt to discuss recent distress score. No answer. Left vm. SW will remain available.     10:20 am-Pt returned call to . Pt stated that she was eating too much chocolate and has had to cut back. Pt stated her insurance gave her # to call for meals on wheels but they are closed today for the holiday so she will try again tomorrow.  will provide pt with a food box today after Rad treatment. Pt stated that she was doing ok otherwise and did not have any other needs at this time. SW will remain available.

## 2025-03-06 DIAGNOSIS — T66.XXXA RADIATION ESOPHAGITIS: Primary | ICD-10-CM

## 2025-03-06 DIAGNOSIS — K20.80 RADIATION ESOPHAGITIS: Primary | ICD-10-CM

## 2025-03-06 RX ORDER — OXYCODONE AND ACETAMINOPHEN 10; 325 MG/1; MG/1
1 TABLET ORAL EVERY 4 HOURS PRN
Qty: 40 TABLET | Refills: 0 | Status: SHIPPED | OUTPATIENT
Start: 2025-03-06

## 2025-03-06 NOTE — ASSESSMENT & PLAN NOTE
Stage IA2 (cT1b, cN0, cM0) s/p robotic RATS with lobectomy in 5/2024 currently on Carbo/Taxol + XRT.

## 2025-03-06 NOTE — PROGRESS NOTES
Subjective:       Patient ID: Cassandra Campos is a 74 y.o. female.    Chief Complaint:   1. Malignant neoplasm of lower lobe of left lung  Stage IA2 (cT1b, cN0, cM0)         Current Treatment:  OP NSCLC PACLitaxel CARBOplatin QW + radiation     Treatment History:  S/p robotic RATS with lobectomy in 5/2024    HPI: This is a 74 year old  woman with obesity, type 2 diabetes, migraines, HTN, gastritis, sleep apnea, DVT, hydradenitis, insomnia, borderline glaucoma, trigger finger, restrictive airway disease, and nasal septum perforation who is seen in Hem/Onc for Stage I left lower lobe adenocarcinoma. She was seen initially in 12/2024 after undergoing robotic RATS with lobectomy in 5/2024.     In 3/2024, she saw Pulmonology after CT chest done in 2/2024 noted a 2.3 x 1.9 cm lobular mass involving the lingula abutting the pleural surface in the left lung base and an additional 13 mm subpleural nodule left lower lobe. Biopsy of the left lung mass was positive for invasive lung adenocarcinoma, TTF-1 +, p40 -.     In 12/2024, she presented to the ER with complaints of diarrhea since 11/2024. CT A/P demonstrated interval development of a 1.2 cm left lower lobe nodule, interval increase in size of a left hilar lymph node measuring 2.1 cm, and interval development of a subtle 1.8 cm right hepatic lobe lesion near the gallbladder fossa; PET/CT was recommended. PET showed interval resection of the previously noted lingular mass with no evidence to suggest recurrent or residual disease at the postsurgical site and FDG avid left hilar adenopathy/mass, increased in size and FDG uptake when compared to the prior exam most consistent with disease progression.      Her primary Hematologist/Oncologist is Dr. Moran.    Interval History: Patient presents for follow up on Carbo/Taxol + XRT; She is scheduled to receive C3D1 today. She presents alone and reports a good appetite with no taste changes and mostly normal  Bms. She denies neuropathy but reports a sore throat from XRT. WBC 2.74, ANC 1700, adequate for treatment. Anemia mild, improved. Plts WNL. CMP with normal kidney function and improved LFTs. Will proceed with treatment.     Reviewed labs with patient:   CBC:   Recent Labs   Lab 03/07/25  1257   WBC 2.74 L   RBC 4.39   Hemoglobin 11.1 L   Hematocrit 34.2 L   Platelets 280   MCV 78 L   MCH 25.3 L   MCHC 32.5     CMP:  Recent Labs   Lab 03/07/25  1257   Glucose 74   Calcium 9.4   Albumin 3.3 L   Total Protein 7.2   Sodium 139   Potassium 4.3   CO2 26   Chloride 106   BUN 19   Creatinine 0.7   Alkaline Phosphatase 74   ALT 69 H   AST 38   Total Bilirubin 0.2     Social History[1]  Past Medical History:   Diagnosis Date    Arthritis     hands    Bilateral bunions     Borderline glaucoma     De Quervain's disease (radial styloid tenosynovitis)     Gastritis     upper GI 2/2017    Hydradenitis     Hyperlipidemia     Hypertension     Insomnia     Lung cancer     Migraines 02/01/2000    Nasal septum perforation     Obesity     Pneumonia     Restrictive airway disease     Sleep apnea     SVT (supraventricular tachycardia) 09/2013    Trigger finger     Type 2 diabetes mellitus 2012     am 02/01/2024     Family History   Problem Relation Name Age of Onset    Prostate cancer Brother      Diabetes Maternal Aunt Alondra Campos     Diabetes Cousin      Hypertension Maternal Grandmother Portia Orosco      Past Surgical History:   Procedure Laterality Date    AXILLARY HIDRADENITIS EXCISION Bilateral     BONE EXOSTOSIS EXCISION Right 07/25/2018    Procedure: EXCISION, EXOSTOSIS;  Surgeon: Jayro Pedraza Sr., MD;  Location: [facility] OR;  Service: Orthopedics;  Laterality: Right;    BREAST BIOPSY Bilateral     both benign    BREAST SURGERY  07/1998    BRONCHOSCOPY Bilateral 1/15/2025    Procedure: Bronchoscopy - insert lighted tube into airway to take a biopsy of lung;  Surgeon: Adrian Robin MD;  Location: [facility] ENDO;  Service:  Pulmonary;  Laterality: Bilateral;    CARPAL TUNNEL RELEASE      bilateral    CARPAL TUNNEL RELEASE Right 2024    Procedure: RELEASE, CARPAL TUNNEL;  Surgeon: Jaime Oswald MD;  Location: [facility];  Service: Orthopedics;  Laterality: Right;    CARPAL TUNNEL RELEASE Left 2024    Procedure: RELEASE, CARPAL TUNNEL;  Surgeon: Jaime Oswald MD;  Location: [facility];  Service: Orthopedics;  Laterality: Left;    CATARACT EXTRACTION Bilateral     OU     SECTION  1979    CHOLECYSTECTOMY  2014    COLONOSCOPY N/A 10/02/2020    Procedure: COLONOSCOPY;  Surgeon: Tushar Edwards MD;  Location: [facility];  Service: Endoscopy;  Laterality: N/A;    COLONOSCOPY W/ POLYPECTOMY  10/02/2020    Polyps x3, repeat 5 years; Tushar Edwards MD     CYST REMOVAL  2015    sebaceous cyst removed from face    DE QUERVAIN'S RELEASE Left 2020    Procedure: RELEASE, HAND, FOR DEQUERVAIN'S TENOSYNOVITIS;  Surgeon: Jaime Oswald MD;  Location: [facility];  Service: Orthopedics;  Laterality: Left;    DE QUERVAIN'S RELEASE Right 2020    Procedure: RELEASE, HAND, FOR DEQUERVAIN'S TENOSYNOVITIS;  Surgeon: Jaime Oswald MD;  Location: [facility];  Service: Orthopedics;  Laterality: Right;    ENDOBRONCHIAL ULTRASOUND Bilateral 04/10/2024    Procedure: ENDOBRONCHIAL ULTRASOUND (EBUS);  Surgeon: Adrian Robin MD;  Location: [facility];  Service: Pulmonary;  Laterality: Bilateral;    ENDOBRONCHIAL ULTRASOUND Bilateral 1/15/2025    Procedure: ENDOBRONCHIAL ULTRASOUND (EBUS);  Surgeon: Adrian Robin MD;  Location: [facility];  Service: Pulmonary;  Laterality: Bilateral;    EYE SURGERY      gastric sleeve  2017    Dr. Watson    INJECTION OF ANESTHETIC AGENT AROUND MULTIPLE INTERCOSTAL NERVES  5/10/2024    Procedure: BLOCK, NERVE, INTERCOSTAL, 2 OR MORE;  Surgeon: Mike Nuñez MD;  Location: [facility];  Service: Cardiothoracic;;    KNEE SURGERY Right     OLECRANON BURSECTOMY  Right 07/25/2018    Procedure: BURSECTOMY, OLECRANON;  Surgeon: Jayro Pedraza Sr., MD;  Location: [facility] OR;  Service: Orthopedics;  Laterality: Right;    SURGICAL REMOVAL OF BUNION WITH OSTEOTOMY OF METATARSAL BONE Left 05/10/2019    Procedure: BUNIONECTOMY, WITH METATARSAL OSTEOTOMY;  Surgeon: Srinivasan Villanueva DPM;  Location: [facility] OR;  Service: Podiatry;  Laterality: Left;    SURGICAL REMOVAL OF BUNION WITH OSTEOTOMY OF METATARSAL BONE Right 06/28/2019    Procedure: BUNIONECTOMY, WITH METATARSAL OSTEOTOMY;  Surgeon: Srinivasan Villanueva DPM;  Location: [facility] OR;  Service: Podiatry;  Laterality: Right;    SURGICAL REMOVAL OF LYMPH NODE Left 5/10/2024    Procedure: EXCISION, LYMPH NODE;  Surgeon: Mike Nuñez MD;  Location: [facility] OR;  Service: Cardiothoracic;  Laterality: Left;    TONSILLECTOMY, ADENOIDECTOMY  1980s    TRANSESOPHAGEAL ECHOCARDIOGRAM WITH POSSIBLE CARDIOVERSION (LAKESHIA W/ POSS CARDIOVERSION) N/A 5/15/2024    Procedure: Transesophageal echo (LAKESHIA) intra-procedure log documentation;  Surgeon: Twan Pelaez MD;  Location: [facility] CATH LAB;  Service: Cardiology;  Laterality: N/A;    TRIGGER FINGER RELEASE Right 04/01/2015    Dr. Milo HALL ROBOTIC RATS,WITH LOBECTOMY,LUNG Left 5/10/2024    Procedure: XI ROBOTIC RATS,WITH LOBECTOMY,LUNG;  Surgeon: Mike Nuñez MD;  Location: [facility] OR;  Service: Cardiothoracic;  Laterality: Left;  Lingulectomy     Review of Systems      Medication List with Changes/Refills   Current Medications    ALBUTEROL (PROVENTIL/VENTOLIN HFA) 90 MCG/ACTUATION INHALER    INHALE 2 PUFFS INTO THE LUNGS EVERY 4 HOURS AS NEEDED FOR WHEEZING OR SHORTNESS OF BREATH.    AMITRIPTYLINE (ELAVIL) 50 MG TABLET    Take 1 tablet (50 mg total) by mouth every evening.    ASPIRIN (ECOTRIN) 81 MG EC TABLET    Take 1 tablet (81 mg total) by mouth once daily.    BLOOD SUGAR DIAGNOSTIC STRP    use to test blood sugar 1-2 times a day    BLOOD-GLUCOSE METER (ACCU-CHEK GUIDE ME GLUCOSE MTR) MISC    use to check  blood glucose 2 times a day    BLOOD-GLUCOSE METER (ACCU-CHEK GUIDE ME GLUCOSE MTR) MISC    To check blood glucose 1-2 times daily, to use with insurance preferred meter    DIPHENHYDRAMINE-ALUMINUM-MAGNESIUM HYDROXIDE-SIMETHICONE-LIDOCAINE VISCOUS HCL 2%    Swish and spit 15 mLs every 4 (four) hours as needed.    ESZOPICLONE (LUNESTA) 3 MG TAB    Take 1 tablet (3 mg total) by mouth every evening.    LANCETS MISC    Use to test blood glucose 1-2 times a day, discard lancet after each use    LORAZEPAM (ATIVAN) 1 MG TABLET    Take 1 tablet (1 mg total) by mouth 2 (two) times daily.    LOSARTAN (COZAAR) 25 MG TABLET    Take 1 tablet (25 mg total) by mouth once daily.    MAGIC MOUTHWASH DIPHEN/ANTAC/LIDOC    swish and spit 15ml every 4 hours as needed    METOPROLOL SUCCINATE (TOPROL-XL) 50 MG 24 HR TABLET    Take 1 tablet (50 mg total) by mouth once daily.    OLANZAPINE (ZYPREXA) 5 MG TABLET    Take 1 tablet (5 mg total) by mouth every evening. On days 1-3 of each chemotherapy cycle.    OMEPRAZOLE (PRILOSEC) 20 MG CAPSULE    Take 1 capsule (20 mg total) by mouth once daily.    OXYCODONE-ACETAMINOPHEN (PERCOCET)  MG PER TABLET    Take 1 tablet by mouth every 4 (four) hours as needed for Pain.    PROCHLORPERAZINE (COMPAZINE) 5 MG TABLET    Take 1 tablet (5 mg total) by mouth every 6 (six) hours as needed for Nausea.    SEMAGLUTIDE (OZEMPIC) 2 MG/DOSE (8 MG/3 ML) PNIJ    Inject 2 mg into the skin every 7 days.    TRAMADOL (ULTRAM) 50 MG TABLET    Take 1 tablet (50 mg total) by mouth 2 (two) times a day.    TRAMADOL (ULTRAM) 50 MG TABLET    Take 1 tablet (50 mg total) by mouth every 12 (twelve) hours as needed for Pain. Greater than 7 day supply medically necessary.    TRIAMCINOLONE ACETONIDE 0.025 % LOTN    Apply small amount to affective areas twice a day  take cool showers or baths     Objective:     Vitals:    03/07/25 1326   BP: 120/70   Pulse: 78   Resp: 18   Temp: 97.2 °F (36.2 °C)     Physical Exam  Vitals  reviewed.   Constitutional:       Appearance: Normal appearance.   HENT:      Head: Normocephalic.      Mouth/Throat:      Comments:     Eyes:      Extraocular Movements: Extraocular movements intact.      Pupils: Pupils are equal, round, and reactive to light.      Comments: Glasses       Cardiovascular:      Rate and Rhythm: Normal rate and regular rhythm.      Heart sounds: Normal heart sounds.   Pulmonary:      Effort: Pulmonary effort is normal.      Breath sounds: Normal breath sounds.   Abdominal:      General: Bowel sounds are normal.      Palpations: Abdomen is soft.      Comments: rounded     Genitourinary:     Comments: deferred    Musculoskeletal:         General: Normal range of motion.      Cervical back: Normal range of motion and neck supple.   Skin:     General: Skin is warm and dry.   Neurological:      Mental Status: She is alert and oriented to person, place, and time.   Psychiatric:         Behavior: Behavior normal.         Thought Content: Thought content normal.          (2) Ambulatory and capable of self care, unable to carry out work activity, up and about > 50% or waking hours  Assessment:     Problem List Items Addressed This Visit          Oncology    Malignant neoplasm of lower lobe of left lung - Primary    Stage IA2 (cT1b, cN0, cM0) s/p robotic RATS with lobectomy in 5/2024 currently on Carbo/Taxol + XRT.          Relevant Orders    [facility] Oncology    Comprehensive Metabolic Panel    Magnesium     Plan:     Malignant neoplasm of lower lobe of left lung  -     [facility] Oncology; Future; Expected date: 03/13/2025  -     Comprehensive Metabolic Panel; Future; Expected date: 03/13/2025  -     Magnesium; Future; Expected date: 03/13/2025    Other orders  -     Cancel: palonosetron (ALOXI) 0.25 mg with Dexamethasone (DECADRON) 12 mg in NS 50 mL IVPB  -     Cancel: diphenhydrAMINE (BENADRYL) 50 mg in 0.9% NaCl 50 mL IVPB  -     Cancel: famotidine (PF) injection 20 mg  -     Cancel: PACLitaxeL  (TAXOL) 45 mg/m2 = 78 mg in 0.9% NaCl 250 mL chemo infusion  -     Cancel: CARBOplatin (PARAPLATIN) 195 mg in 0.9% NaCl 269.5 mL chemo infusion  -     prochlorperazine injection Soln 5 mg  -     EPINEPHrine (EPIPEN) 0.3 mg/0.3 mL pen injection 0.3 mg  -     diphenhydrAMINE injection 50 mg  -     hydrocortisone sodium succinate injection 100 mg  -     Cancel: 0.9% NaCl 250 mL flush bag  -     sodium chloride 0.9% flush 10 mL  -     heparin, porcine (PF) 100 unit/mL injection flush 500 Units  -     alteplase injection 2 mg    Labs reviewed.   Ok to proceed with C3D1 of Carbo/Taxol + XRT today.  Follow up in 1 week with  Mg, CBC, and Comprehensive Metabolic Panel prior to C4D1.    Route Chart for Scheduling    Med Onc Chart Routing      Follow up with physician    Follow up with [facility] 1 week.   Infusion scheduling note   in 1 week for C4D1 Carbo/Taxol   Injection scheduling note    Labs CBC, CMP and magnesium   Scheduling:  Preferred lab:  Lab interval:  in 1 week   Imaging None      Pharmacy appointment No pharmacy appointment needed      Other referrals       No additional referrals needed             I will review assessment/plan with collaborating physician.      BURT Hart         [1]   Social History  Socioeconomic History    Marital status:     Number of children: 1   Occupational History    Occupation:  aid   Tobacco Use    Smoking status: Former     Current packs/day: 0.00     Average packs/day: 0.5 packs/day for 42.0 years (21.0 ttl pk-yrs)     Types: Cigarettes     Start date: 1970     Quit date: 2012     Years since quittin.1     Passive exposure: Past    Smokeless tobacco: Never   Substance and Sexual Activity    Alcohol use: Not Currently     Alcohol/week: 1.0 standard drink of alcohol     Types: 1 Glasses of wine per week     Comment: Glass red wine once a every 2 weeks    Drug use: Never    Sexual activity: Not Currently     Partners: Female     Birth  control/protection: Abstinence, None   Social History Narrative    Single, part-time teacher. Masters degree biology.      Social Drivers of Health     Financial Resource Strain: Patient Declined (12/19/2024)    Overall Financial Resource Strain (CARDIA)     Difficulty of Paying Living Expenses: Patient declined   Food Insecurity: Patient Declined (12/19/2024)    Hunger Vital Sign     Worried About Running Out of Food in the Last Year: Patient declined     Ran Out of Food in the Last Year: Patient declined   Transportation Needs: No Transportation Needs (12/19/2023)    PRAPARE - Transportation     Lack of Transportation (Medical): No     Lack of Transportation (Non-Medical): No   Physical Activity: Insufficiently Active (12/19/2024)    Exercise Vital Sign     Days of Exercise per Week: 3 days     Minutes of Exercise per Session: 20 min   Stress: Stress Concern Present (12/19/2024)    [employer] [place] [employer] - Occupational Stress Questionnaire     Feeling of Stress : Very much   Housing Stability: High Risk (12/19/2024)    Housing Stability Vital Sign     Unable to Pay for Housing in the Last Year: Yes

## 2025-03-07 ENCOUNTER — INFUSION (OUTPATIENT)
Dept: INFUSION THERAPY | Facility: HOSPITAL | Age: 75
End: 2025-03-07
Attending: INTERNAL MEDICINE
Payer: MEDICARE

## 2025-03-07 ENCOUNTER — OFFICE VISIT (OUTPATIENT)
Dept: HEMATOLOGY/ONCOLOGY | Facility: CLINIC | Age: 75
End: 2025-03-07
Payer: MEDICARE

## 2025-03-07 ENCOUNTER — LAB VISIT (OUTPATIENT)
Dept: LAB | Facility: HOSPITAL | Age: 75
End: 2025-03-07
Attending: INTERNAL MEDICINE
Payer: MEDICARE

## 2025-03-07 VITALS
BODY MASS INDEX: 27.27 KG/M2 | DIASTOLIC BLOOD PRESSURE: 70 MMHG | HEART RATE: 78 BPM | OXYGEN SATURATION: 98 % | WEIGHT: 153.88 LBS | SYSTOLIC BLOOD PRESSURE: 120 MMHG | TEMPERATURE: 97 F | RESPIRATION RATE: 18 BRPM | HEIGHT: 63 IN

## 2025-03-07 VITALS
OXYGEN SATURATION: 95 % | RESPIRATION RATE: 18 BRPM | HEART RATE: 90 BPM | DIASTOLIC BLOOD PRESSURE: 78 MMHG | TEMPERATURE: 97 F | SYSTOLIC BLOOD PRESSURE: 159 MMHG

## 2025-03-07 DIAGNOSIS — R94.6 ABNORMAL RESULTS OF THYROID FUNCTION STUDIES: ICD-10-CM

## 2025-03-07 DIAGNOSIS — C34.32 MALIGNANT NEOPLASM OF LOWER LOBE OF LEFT LUNG: Primary | ICD-10-CM

## 2025-03-07 DIAGNOSIS — C34.92 ADENOCARCINOMA OF LEFT LUNG: ICD-10-CM

## 2025-03-07 LAB
ALBUMIN SERPL BCP-MCNC: 3.3 G/DL (ref 3.5–5.2)
ALP SERPL-CCNC: 74 U/L (ref 40–150)
ALT SERPL W/O P-5'-P-CCNC: 69 U/L (ref 10–44)
ANION GAP SERPL CALC-SCNC: 7 MMOL/L (ref 8–16)
AST SERPL-CCNC: 38 U/L (ref 10–40)
BASOPHILS # BLD AUTO: 0.02 K/UL (ref 0–0.2)
BASOPHILS NFR BLD: 0.7 % (ref 0–1.9)
BILIRUB SERPL-MCNC: 0.2 MG/DL (ref 0.1–1)
BUN SERPL-MCNC: 19 MG/DL (ref 8–23)
CALCIUM SERPL-MCNC: 9.4 MG/DL (ref 8.7–10.5)
CHLORIDE SERPL-SCNC: 106 MMOL/L (ref 95–110)
CO2 SERPL-SCNC: 26 MMOL/L (ref 23–29)
CREAT SERPL-MCNC: 0.7 MG/DL (ref 0.5–1.4)
DIFFERENTIAL METHOD BLD: ABNORMAL
EOSINOPHIL # BLD AUTO: 0 K/UL (ref 0–0.5)
EOSINOPHIL NFR BLD: 1.1 % (ref 0–8)
ERYTHROCYTE [DISTWIDTH] IN BLOOD BY AUTOMATED COUNT: 18.6 % (ref 11.5–14.5)
EST. GFR  (NO RACE VARIABLE): >60 ML/MIN/1.73 M^2
GLUCOSE SERPL-MCNC: 74 MG/DL (ref 70–110)
HCT VFR BLD AUTO: 34.2 % (ref 37–48.5)
HGB BLD-MCNC: 11.1 G/DL (ref 12–16)
IMM GRANULOCYTES # BLD AUTO: 0.02 K/UL (ref 0–0.04)
IMM GRANULOCYTES NFR BLD AUTO: 0.7 % (ref 0–0.5)
LYMPHOCYTES # BLD AUTO: 0.7 K/UL (ref 1–4.8)
LYMPHOCYTES NFR BLD: 24.1 % (ref 18–48)
MAGNESIUM SERPL-MCNC: 2 MG/DL (ref 1.6–2.6)
MCH RBC QN AUTO: 25.3 PG (ref 27–31)
MCHC RBC AUTO-ENTMCNC: 32.5 G/DL (ref 32–36)
MCV RBC AUTO: 78 FL (ref 82–98)
MONOCYTES # BLD AUTO: 0.3 K/UL (ref 0.3–1)
MONOCYTES NFR BLD: 10.9 % (ref 4–15)
NEUTROPHILS # BLD AUTO: 1.7 K/UL (ref 1.8–7.7)
NEUTROPHILS NFR BLD: 62.5 % (ref 38–73)
NRBC BLD-RTO: 0 /100 WBC
PLATELET # BLD AUTO: 280 K/UL (ref 150–450)
PMV BLD AUTO: 8.7 FL (ref 9.2–12.9)
POTASSIUM SERPL-SCNC: 4.3 MMOL/L (ref 3.5–5.1)
PROT SERPL-MCNC: 7.2 G/DL (ref 6–8.4)
RBC # BLD AUTO: 4.39 M/UL (ref 4–5.4)
SODIUM SERPL-SCNC: 139 MMOL/L (ref 136–145)
TSH SERPL DL<=0.005 MIU/L-ACNC: 2.31 UIU/ML (ref 0.4–4)
WBC # BLD AUTO: 2.74 K/UL (ref 3.9–12.7)

## 2025-03-07 PROCEDURE — 96413 CHEMO IV INFUSION 1 HR: CPT

## 2025-03-07 PROCEDURE — 99999 PR PBB SHADOW E&M-EST. PATIENT-LVL V: CPT | Mod: PBBFAC,,, | Performed by: NURSE PRACTITIONER

## 2025-03-07 PROCEDURE — 36415 COLL VENOUS BLD VENIPUNCTURE: CPT | Performed by: INTERNAL MEDICINE

## 2025-03-07 PROCEDURE — 85025 COMPLETE CBC W/AUTO DIFF WBC: CPT | Performed by: INTERNAL MEDICINE

## 2025-03-07 PROCEDURE — 80053 COMPREHEN METABOLIC PANEL: CPT | Performed by: INTERNAL MEDICINE

## 2025-03-07 PROCEDURE — 25000003 PHARM REV CODE 250: Performed by: NURSE PRACTITIONER

## 2025-03-07 PROCEDURE — 96375 TX/PRO/DX INJ NEW DRUG ADDON: CPT

## 2025-03-07 PROCEDURE — 96367 TX/PROPH/DG ADDL SEQ IV INF: CPT

## 2025-03-07 PROCEDURE — 96415 CHEMO IV INFUSION ADDL HR: CPT

## 2025-03-07 PROCEDURE — 84443 ASSAY THYROID STIM HORMONE: CPT | Performed by: INTERNAL MEDICINE

## 2025-03-07 PROCEDURE — 83735 ASSAY OF MAGNESIUM: CPT | Performed by: INTERNAL MEDICINE

## 2025-03-07 PROCEDURE — 63600175 PHARM REV CODE 636 W HCPCS: Performed by: NURSE PRACTITIONER

## 2025-03-07 RX ORDER — SODIUM CHLORIDE 0.9 % (FLUSH) 0.9 %
10 SYRINGE (ML) INJECTION
OUTPATIENT
Start: 2025-03-07

## 2025-03-07 RX ORDER — FAMOTIDINE 10 MG/ML
20 INJECTION INTRAVENOUS
Status: CANCELLED | OUTPATIENT
Start: 2025-03-07

## 2025-03-07 RX ORDER — HEPARIN 100 UNIT/ML
500 SYRINGE INTRAVENOUS
OUTPATIENT
Start: 2025-03-07

## 2025-03-07 RX ORDER — DIPHENHYDRAMINE HYDROCHLORIDE 50 MG/ML
50 INJECTION INTRAMUSCULAR; INTRAVENOUS ONCE AS NEEDED
Status: DISCONTINUED | OUTPATIENT
Start: 2025-03-07 | End: 2025-03-07 | Stop reason: HOSPADM

## 2025-03-07 RX ORDER — PROCHLORPERAZINE EDISYLATE 5 MG/ML
5 INJECTION INTRAMUSCULAR; INTRAVENOUS ONCE AS NEEDED
OUTPATIENT
Start: 2025-03-07

## 2025-03-07 RX ORDER — FAMOTIDINE 10 MG/ML
20 INJECTION INTRAVENOUS
Status: COMPLETED | OUTPATIENT
Start: 2025-03-07 | End: 2025-03-07

## 2025-03-07 RX ORDER — EPINEPHRINE 0.3 MG/.3ML
0.3 INJECTION SUBCUTANEOUS ONCE AS NEEDED
Status: CANCELLED | OUTPATIENT
Start: 2025-03-07

## 2025-03-07 RX ORDER — EPINEPHRINE 0.3 MG/.3ML
0.3 INJECTION SUBCUTANEOUS ONCE AS NEEDED
Status: DISCONTINUED | OUTPATIENT
Start: 2025-03-07 | End: 2025-03-07 | Stop reason: HOSPADM

## 2025-03-07 RX ORDER — DIPHENHYDRAMINE HYDROCHLORIDE 50 MG/ML
50 INJECTION INTRAMUSCULAR; INTRAVENOUS ONCE AS NEEDED
Status: CANCELLED | OUTPATIENT
Start: 2025-03-07

## 2025-03-07 RX ADMIN — SODIUM CHLORIDE 50 MG: 9 INJECTION, SOLUTION INTRAVENOUS at 03:03

## 2025-03-07 RX ADMIN — SODIUM CHLORIDE 0.25 MG: 9 INJECTION, SOLUTION INTRAVENOUS at 02:03

## 2025-03-07 RX ADMIN — CARBOPLATIN 195 MG: 10 INJECTION, SOLUTION INTRAVENOUS at 04:03

## 2025-03-07 RX ADMIN — PACLITAXEL 78 MG: 6 INJECTION, SOLUTION INTRAVENOUS at 03:03

## 2025-03-07 RX ADMIN — SODIUM CHLORIDE: 9 INJECTION, SOLUTION INTRAVENOUS at 02:03

## 2025-03-07 RX ADMIN — FAMOTIDINE 20 MG: 10 INJECTION, SOLUTION INTRAVENOUS at 02:03

## 2025-03-07 NOTE — PLAN OF CARE
Problem: Adult Inpatient Plan of Care  Goal: Plan of Care Review  Outcome: Progressing  Flowsheets (Taken 3/7/2025 1446)  Plan of Care Reviewed With: patient  Goal: Patient-Specific Goal (Individualized)  Outcome: Progressing  Flowsheets (Taken 3/7/2025 1446)  Individualized Care Needs: warm blanket, reclining position, apple juice/cheezits  Anxieties, Fears or Concerns: none verbalized     Problem: Infection  Goal: Absence of Infection Signs and Symptoms  Outcome: Progressing  Intervention: Prevent or Manage Infection  Flowsheets (Taken 3/7/2025 1446)  Infection Management: aseptic technique maintained

## 2025-03-10 ENCOUNTER — DOCUMENTATION ONLY (OUTPATIENT)
Dept: RADIATION ONCOLOGY | Facility: CLINIC | Age: 75
End: 2025-03-10
Payer: MEDICARE

## 2025-03-10 RX ORDER — TRIAMCINOLONE ACETONIDE 0.25 MG/ML
1 LOTION TOPICAL 2 TIMES DAILY
Qty: 60 ML | Refills: 0 | Status: SHIPPED | OUTPATIENT
Start: 2025-03-10

## 2025-03-10 NOTE — TELEPHONE ENCOUNTER
No care due was identified.  [facility] Embedded Care Due Messages. Reference number: [id number].   3/10/2025 8:42:39 AM CDT

## 2025-03-14 ENCOUNTER — OFFICE VISIT (OUTPATIENT)
Dept: HEMATOLOGY/ONCOLOGY | Facility: CLINIC | Age: 75
End: 2025-03-14
Payer: MEDICARE

## 2025-03-14 ENCOUNTER — LAB VISIT (OUTPATIENT)
Dept: LAB | Facility: HOSPITAL | Age: 75
End: 2025-03-14
Attending: INTERNAL MEDICINE
Payer: MEDICARE

## 2025-03-14 ENCOUNTER — INFUSION (OUTPATIENT)
Dept: INFUSION THERAPY | Facility: HOSPITAL | Age: 75
End: 2025-03-14
Attending: INTERNAL MEDICINE
Payer: MEDICARE

## 2025-03-14 VITALS
HEART RATE: 92 BPM | DIASTOLIC BLOOD PRESSURE: 73 MMHG | WEIGHT: 161.25 LBS | OXYGEN SATURATION: 97 % | BODY MASS INDEX: 28.57 KG/M2 | HEIGHT: 63 IN | TEMPERATURE: 97 F | SYSTOLIC BLOOD PRESSURE: 117 MMHG

## 2025-03-14 VITALS
DIASTOLIC BLOOD PRESSURE: 82 MMHG | HEART RATE: 94 BPM | SYSTOLIC BLOOD PRESSURE: 141 MMHG | TEMPERATURE: 98 F | RESPIRATION RATE: 18 BRPM | OXYGEN SATURATION: 98 %

## 2025-03-14 DIAGNOSIS — D84.821 IMMUNODEFICIENCY DUE TO CHEMOTHERAPY: Primary | ICD-10-CM

## 2025-03-14 DIAGNOSIS — Z79.899 IMMUNODEFICIENCY DUE TO CHEMOTHERAPY: Primary | ICD-10-CM

## 2025-03-14 DIAGNOSIS — C34.32 MALIGNANT NEOPLASM OF LOWER LOBE OF LEFT LUNG: ICD-10-CM

## 2025-03-14 DIAGNOSIS — T45.1X5A IMMUNODEFICIENCY DUE TO CHEMOTHERAPY: Primary | ICD-10-CM

## 2025-03-14 DIAGNOSIS — C34.32 MALIGNANT NEOPLASM OF LOWER LOBE OF LEFT LUNG: Primary | ICD-10-CM

## 2025-03-14 LAB
ALBUMIN SERPL BCP-MCNC: 3.3 G/DL (ref 3.5–5.2)
ALP SERPL-CCNC: 67 U/L (ref 40–150)
ALT SERPL W/O P-5'-P-CCNC: 54 U/L (ref 10–44)
ANION GAP SERPL CALC-SCNC: 7 MMOL/L (ref 8–16)
AST SERPL-CCNC: 20 U/L (ref 10–40)
BILIRUB SERPL-MCNC: 0.2 MG/DL (ref 0.1–1)
BUN SERPL-MCNC: 16 MG/DL (ref 8–23)
CALCIUM SERPL-MCNC: 9.5 MG/DL (ref 8.7–10.5)
CHLORIDE SERPL-SCNC: 107 MMOL/L (ref 95–110)
CO2 SERPL-SCNC: 26 MMOL/L (ref 23–29)
CREAT SERPL-MCNC: 0.7 MG/DL (ref 0.5–1.4)
ERYTHROCYTE [DISTWIDTH] IN BLOOD BY AUTOMATED COUNT: 19.4 % (ref 11.5–14.5)
EST. GFR  (NO RACE VARIABLE): >60 ML/MIN/1.73 M^2
GLUCOSE SERPL-MCNC: 85 MG/DL (ref 70–110)
HCT VFR BLD AUTO: 32.4 % (ref 37–48.5)
HGB BLD-MCNC: 10.5 G/DL (ref 12–16)
IMM GRANULOCYTES # BLD AUTO: 0.02 K/UL (ref 0–0.04)
MAGNESIUM SERPL-MCNC: 1.9 MG/DL (ref 1.6–2.6)
MCH RBC QN AUTO: 25.9 PG (ref 27–31)
MCHC RBC AUTO-ENTMCNC: 32.4 G/DL (ref 32–36)
MCV RBC AUTO: 80 FL (ref 82–98)
NEUTROPHILS # BLD AUTO: 1.6 K/UL (ref 1.8–7.7)
PLATELET # BLD AUTO: 216 K/UL (ref 150–450)
PMV BLD AUTO: 9.1 FL (ref 9.2–12.9)
POTASSIUM SERPL-SCNC: 4.1 MMOL/L (ref 3.5–5.1)
PROT SERPL-MCNC: 6.8 G/DL (ref 6–8.4)
RBC # BLD AUTO: 4.06 M/UL (ref 4–5.4)
SODIUM SERPL-SCNC: 140 MMOL/L (ref 136–145)
WBC # BLD AUTO: 2.67 K/UL (ref 3.9–12.7)

## 2025-03-14 PROCEDURE — 96417 CHEMO IV INFUS EACH ADDL SEQ: CPT

## 2025-03-14 PROCEDURE — 25000003 PHARM REV CODE 250: Performed by: NURSE PRACTITIONER

## 2025-03-14 PROCEDURE — 36415 COLL VENOUS BLD VENIPUNCTURE: CPT | Performed by: NURSE PRACTITIONER

## 2025-03-14 PROCEDURE — 99999 PR PBB SHADOW E&M-EST. PATIENT-LVL V: CPT | Mod: PBBFAC,,, | Performed by: NURSE PRACTITIONER

## 2025-03-14 PROCEDURE — 96367 TX/PROPH/DG ADDL SEQ IV INF: CPT

## 2025-03-14 PROCEDURE — 85027 COMPLETE CBC AUTOMATED: CPT | Performed by: NURSE PRACTITIONER

## 2025-03-14 PROCEDURE — 80053 COMPREHEN METABOLIC PANEL: CPT | Performed by: NURSE PRACTITIONER

## 2025-03-14 PROCEDURE — 96413 CHEMO IV INFUSION 1 HR: CPT

## 2025-03-14 PROCEDURE — 83735 ASSAY OF MAGNESIUM: CPT | Performed by: NURSE PRACTITIONER

## 2025-03-14 PROCEDURE — 96375 TX/PRO/DX INJ NEW DRUG ADDON: CPT

## 2025-03-14 PROCEDURE — 63600175 PHARM REV CODE 636 W HCPCS: Performed by: NURSE PRACTITIONER

## 2025-03-14 RX ORDER — EPINEPHRINE 0.3 MG/.3ML
0.3 INJECTION SUBCUTANEOUS ONCE AS NEEDED
OUTPATIENT
Start: 2025-03-14

## 2025-03-14 RX ORDER — DIPHENHYDRAMINE HYDROCHLORIDE 50 MG/ML
50 INJECTION, SOLUTION INTRAMUSCULAR; INTRAVENOUS ONCE AS NEEDED
OUTPATIENT
Start: 2025-03-14

## 2025-03-14 RX ORDER — PROCHLORPERAZINE EDISYLATE 5 MG/ML
5 INJECTION INTRAMUSCULAR; INTRAVENOUS ONCE AS NEEDED
OUTPATIENT
Start: 2025-03-14

## 2025-03-14 RX ORDER — FAMOTIDINE 10 MG/ML
20 INJECTION, SOLUTION INTRAVENOUS
Status: COMPLETED | OUTPATIENT
Start: 2025-03-14 | End: 2025-03-14

## 2025-03-14 RX ORDER — HEPARIN 100 UNIT/ML
500 SYRINGE INTRAVENOUS
OUTPATIENT
Start: 2025-03-14

## 2025-03-14 RX ORDER — SODIUM CHLORIDE 0.9 % (FLUSH) 0.9 %
10 SYRINGE (ML) INJECTION
OUTPATIENT
Start: 2025-03-14

## 2025-03-14 RX ORDER — PROCHLORPERAZINE EDISYLATE 5 MG/ML
5 INJECTION INTRAMUSCULAR; INTRAVENOUS ONCE AS NEEDED
Status: CANCELLED | OUTPATIENT
Start: 2025-03-14

## 2025-03-14 RX ORDER — FAMOTIDINE 10 MG/ML
20 INJECTION, SOLUTION INTRAVENOUS
Status: CANCELLED | OUTPATIENT
Start: 2025-03-14

## 2025-03-14 RX ADMIN — SODIUM CHLORIDE 0.25 MG: 9 INJECTION, SOLUTION INTRAVENOUS at 10:03

## 2025-03-14 RX ADMIN — PACLITAXEL 84 MG: 6 INJECTION, SOLUTION INTRAVENOUS at 10:03

## 2025-03-14 RX ADMIN — FAMOTIDINE 20 MG: 10 INJECTION, SOLUTION INTRAVENOUS at 10:03

## 2025-03-14 RX ADMIN — SODIUM CHLORIDE 50 MG: 9 INJECTION, SOLUTION INTRAVENOUS at 10:03

## 2025-03-14 RX ADMIN — CARBOPLATIN 210 MG: 10 INJECTION, SOLUTION INTRAVENOUS at 11:03

## 2025-03-14 NOTE — PLAN OF CARE
Problem: Adult Inpatient Plan of Care  Goal: Plan of Care Review  Outcome: Progressing  Flowsheets (Taken 3/14/2025 1027)  Plan of Care Reviewed With: patient  Goal: Patient-Specific Goal (Individualized)  Outcome: Progressing  Flowsheets (Taken 3/14/2025 1027)  Individualized Care Needs: warm blanket, reclining position  Anxieties, Fears or Concerns: none verbalized     Problem: Infection  Goal: Absence of Infection Signs and Symptoms  Outcome: Progressing

## 2025-03-14 NOTE — ASSESSMENT & PLAN NOTE
Cancer Staging   Malignant neoplasm of lower lobe of left lung  Staging form: Lung, AJCC 8th Edition  - Clinical stage from 6/28/2024: Stage IA2 (cT1b, cN0, cM0) - Signed by Jaime Gallagher MD on 6/28/2024    Labs reviewed stable. Patient denies clinical concerns    Proceed with C4D1 Carboplatin/Taxol (dose changed due to weight increase). Radiation per Rad/Onc. F/u 1 week with repeat labs for cycle 5 chemo

## 2025-03-14 NOTE — PROGRESS NOTES
Subjective:       Patient ID: Cassandra Campos is a 74 y.o. female.    Chief Complaint: Review labs. Chemo      Cancer Staging   Malignant neoplasm of lower lobe of left lung  Staging form: Lung, AJCC 8th Edition  - Clinical stage from 6/28/2024: Stage IA2 (cT1b, cN0, cM0) - Signed by Jaime Gallagher MD on 6/28/2024      HPI: 74 ylo female prsenting today for follow up of her lung cancer currently being treated with weekly Carboplatin/Taxol + radiation. She notes tolerating treatments well without any significant side effects. She notes feeling well. Denies clinical concerns. She says she moved to [place] with her children and has been eating more and has gained weight.     Social History[1]    Past Medical History:   Diagnosis Date    Arthritis     hands    Bilateral bunions     Borderline glaucoma     De Quervain's disease (radial styloid tenosynovitis)     Gastritis     upper GI 2/2017    Hydradenitis     Hyperlipidemia     Hypertension     Insomnia     Lung cancer     Migraines 02/01/2000    Nasal septum perforation     Obesity     Pneumonia     Restrictive airway disease     Sleep apnea     SVT (supraventricular tachycardia) 09/2013    Trigger finger     Type 2 diabetes mellitus 2012     am 02/01/2024       Family History   Problem Relation Name Age of Onset    Prostate cancer Brother      Diabetes Maternal Aunt Alondra Campos     Diabetes Cousin      Hypertension Maternal Grandmother Portia Orosco        Past Surgical History:   Procedure Laterality Date    AXILLARY HIDRADENITIS EXCISION Bilateral     BONE EXOSTOSIS EXCISION Right 07/25/2018    Procedure: EXCISION, EXOSTOSIS;  Surgeon: Jayro Pedraza Sr., MD;  Location: [facility];  Service: Orthopedics;  Laterality: Right;    BREAST BIOPSY Bilateral     both benign    BREAST SURGERY  07/1998    BRONCHOSCOPY Bilateral 1/15/2025    Procedure: Bronchoscopy - insert lighted tube into airway to take a biopsy of lung;  Surgeon: Adrian Robin MD;   Location: [facility];  Service: Pulmonary;  Laterality: Bilateral;    CARPAL TUNNEL RELEASE      bilateral    CARPAL TUNNEL RELEASE Right 2024    Procedure: RELEASE, CARPAL TUNNEL;  Surgeon: Jaime Oswald MD;  Location: [facility];  Service: Orthopedics;  Laterality: Right;    CARPAL TUNNEL RELEASE Left 2024    Procedure: RELEASE, CARPAL TUNNEL;  Surgeon: Jaime Oswald MD;  Location: [facility];  Service: Orthopedics;  Laterality: Left;    CATARACT EXTRACTION Bilateral     OU     SECTION  1979    CHOLECYSTECTOMY  2014    COLONOSCOPY N/A 10/02/2020    Procedure: COLONOSCOPY;  Surgeon: Tushar Edwards MD;  Location: [facility];  Service: Endoscopy;  Laterality: N/A;    COLONOSCOPY W/ POLYPECTOMY  10/02/2020    Polyps x3, repeat 5 years; Tushar Edwards MD     CYST REMOVAL  2015    sebaceous cyst removed from face    DE QUERVAIN'S RELEASE Left 2020    Procedure: RELEASE, HAND, FOR DEQUERVAIN'S TENOSYNOVITIS;  Surgeon: Jaime Oswald MD;  Location: [facility];  Service: Orthopedics;  Laterality: Left;    DE QUERVAIN'S RELEASE Right 2020    Procedure: RELEASE, HAND, FOR DEQUERVAIN'S TENOSYNOVITIS;  Surgeon: Jaime Oswald MD;  Location: [facility];  Service: Orthopedics;  Laterality: Right;    ENDOBRONCHIAL ULTRASOUND Bilateral 04/10/2024    Procedure: ENDOBRONCHIAL ULTRASOUND (EBUS);  Surgeon: Adrian Robin MD;  Location: [facility];  Service: Pulmonary;  Laterality: Bilateral;    ENDOBRONCHIAL ULTRASOUND Bilateral 1/15/2025    Procedure: ENDOBRONCHIAL ULTRASOUND (EBUS);  Surgeon: Adrian Robin MD;  Location: [facility];  Service: Pulmonary;  Laterality: Bilateral;    EYE SURGERY      gastric sleeve  2017    Dr. Watson    INJECTION OF ANESTHETIC AGENT AROUND MULTIPLE INTERCOSTAL NERVES  5/10/2024    Procedure: BLOCK, NERVE, INTERCOSTAL, 2 OR MORE;  Surgeon: Mike Nuñez MD;  Location: [facility];  Service: Cardiothoracic;;    KNEE SURGERY  Right     OLECRANON BURSECTOMY Right 07/25/2018    Procedure: BURSECTOMY, OLECRANON;  Surgeon: Jayro Pedraza Sr., MD;  Location: [facility] OR;  Service: Orthopedics;  Laterality: Right;    SURGICAL REMOVAL OF BUNION WITH OSTEOTOMY OF METATARSAL BONE Left 05/10/2019    Procedure: BUNIONECTOMY, WITH METATARSAL OSTEOTOMY;  Surgeon: Srinivasan Villanueva DPM;  Location: [facility] OR;  Service: Podiatry;  Laterality: Left;    SURGICAL REMOVAL OF BUNION WITH OSTEOTOMY OF METATARSAL BONE Right 06/28/2019    Procedure: BUNIONECTOMY, WITH METATARSAL OSTEOTOMY;  Surgeon: Srinivasan Villanueva DPM;  Location: [facility] OR;  Service: Podiatry;  Laterality: Right;    SURGICAL REMOVAL OF LYMPH NODE Left 5/10/2024    Procedure: EXCISION, LYMPH NODE;  Surgeon: Mike Nuñez MD;  Location: [facility] OR;  Service: Cardiothoracic;  Laterality: Left;    TONSILLECTOMY, ADENOIDECTOMY  1980s    TRANSESOPHAGEAL ECHOCARDIOGRAM WITH POSSIBLE CARDIOVERSION (LAKESHIA W/ POSS CARDIOVERSION) N/A 5/15/2024    Procedure: Transesophageal echo (LAKESHIA) intra-procedure log documentation;  Surgeon: Twan Pelaez MD;  Location: [facility] CATH LAB;  Service: Cardiology;  Laterality: N/A;    TRIGGER FINGER RELEASE Right 04/01/2015    Dr. Milo HALL ROBOTIC RATS,WITH LOBECTOMY,LUNG Left 5/10/2024    Procedure: XI ROBOTIC RATS,WITH LOBECTOMY,LUNG;  Surgeon: Mike Nuñez MD;  Location: [facility] OR;  Service: Cardiothoracic;  Laterality: Left;  Lingulectomy       Review of Systems   Constitutional:  Negative for activity change, appetite change, chills, fatigue, fever and unexpected weight change.   HENT:  Negative for congestion, mouth sores, nosebleeds, sore throat, trouble swallowing and voice change.    Respiratory:  Negative for cough, chest tightness, shortness of breath and wheezing.    Cardiovascular:  Negative for chest pain and leg swelling.   Gastrointestinal:  Negative for abdominal distention, abdominal pain, blood in stool, constipation, diarrhea, nausea and vomiting.    Genitourinary:  Negative for difficulty urinating, dysuria and hematuria.   Musculoskeletal:  Negative for arthralgias, back pain and myalgias.   Skin:  Negative for pallor, rash and wound.   Neurological:  Negative for dizziness, syncope, weakness and headaches.   Hematological:  Negative for adenopathy. Does not bruise/bleed easily.   Psychiatric/Behavioral:  The patient is not nervous/anxious.          Medication List with Changes/Refills   Current Medications    ALBUTEROL (PROVENTIL/VENTOLIN HFA) 90 MCG/ACTUATION INHALER    INHALE 2 PUFFS INTO THE LUNGS EVERY 4 HOURS AS NEEDED FOR WHEEZING OR SHORTNESS OF BREATH.    AMITRIPTYLINE (ELAVIL) 50 MG TABLET    Take 1 tablet (50 mg total) by mouth every evening.    ASPIRIN (ECOTRIN) 81 MG EC TABLET    Take 1 tablet (81 mg total) by mouth once daily.    BLOOD SUGAR DIAGNOSTIC STRP    use to test blood sugar 1-2 times a day    BLOOD-GLUCOSE METER (ACCU-CHEK GUIDE ME GLUCOSE MTR) MISC    use to check blood glucose 2 times a day    BLOOD-GLUCOSE METER (ACCU-CHEK GUIDE ME GLUCOSE MTR) MISC    To check blood glucose 1-2 times daily, to use with insurance preferred meter    DIPHENHYDRAMINE-ALUMINUM-MAGNESIUM HYDROXIDE-SIMETHICONE-LIDOCAINE VISCOUS HCL 2%    Swish and spit 15 mLs every 4 (four) hours as needed.    ESZOPICLONE (LUNESTA) 3 MG TAB    Take 1 tablet (3 mg total) by mouth every evening.    LANCETS MISC    Use to test blood glucose 1-2 times a day, discard lancet after each use    LORAZEPAM (ATIVAN) 1 MG TABLET    Take 1 tablet (1 mg total) by mouth 2 (two) times daily.    LOSARTAN (COZAAR) 25 MG TABLET    Take 1 tablet (25 mg total) by mouth once daily.    MAGIC MOUTHWASH DIPHEN/ANTAC/LIDOC    swish and spit 15ml every 4 hours as needed    METOPROLOL SUCCINATE (TOPROL-XL) 50 MG 24 HR TABLET    Take 1 tablet (50 mg total) by mouth once daily.    OLANZAPINE (ZYPREXA) 5 MG TABLET    Take 1 tablet (5 mg total) by mouth every evening. On days 1-3 of each chemotherapy  cycle.    OMEPRAZOLE (PRILOSEC) 20 MG CAPSULE    Take 1 capsule (20 mg total) by mouth once daily.    OXYCODONE-ACETAMINOPHEN (PERCOCET)  MG PER TABLET    Take 1 tablet by mouth every 4 (four) hours as needed for Pain.    PROCHLORPERAZINE (COMPAZINE) 5 MG TABLET    Take 1 tablet (5 mg total) by mouth every 6 (six) hours as needed for Nausea.    SEMAGLUTIDE (OZEMPIC) 2 MG/DOSE (8 MG/3 ML) PNIJ    Inject 2 mg into the skin every 7 days.    TRAMADOL (ULTRAM) 50 MG TABLET    Take 1 tablet (50 mg total) by mouth 2 (two) times a day.    TRAMADOL (ULTRAM) 50 MG TABLET    Take 1 tablet (50 mg total) by mouth every 12 (twelve) hours as needed for Pain. Greater than 7 day supply medically necessary.    TRIAMCINOLONE ACETONIDE 0.025 % LOTN    Apply small amount to affected areas twice a day.  Take cool showers or baths     Objective:     Vitals:    03/14/25 0855   BP: 117/73   Pulse: 92   Temp: 97.3 °F (36.3 °C)     Lab Results   Component Value Date    WBC 2.67 (L) 03/14/2025    HGB 10.5 (L) 03/14/2025    HCT 32.4 (L) 03/14/2025    MCV 80 (L) 03/14/2025     03/14/2025       BMP  Lab Results   Component Value Date     03/14/2025    K 4.1 03/14/2025     03/14/2025    CO2 26 03/14/2025    BUN 16 03/14/2025    CREATININE 0.7 03/14/2025    CALCIUM 9.5 03/14/2025    ANIONGAP 7 (L) 03/14/2025    EGFRNORACEVR >60 03/14/2025     Lab Results   Component Value Date    ALT 54 (H) 03/14/2025    AST 20 03/14/2025    ALKPHOS 67 03/14/2025    BILITOT 0.2 03/14/2025       Physical Exam  Vitals reviewed.   Constitutional:       Appearance: She is well-developed.   HENT:      Head: Normocephalic.      Right Ear: External ear normal.      Left Ear: External ear normal.      Nose: Nose normal.   Eyes:      General: Lids are normal. No scleral icterus.        Right eye: No discharge.         Left eye: No discharge.      Conjunctiva/sclera: Conjunctivae normal.   Neck:      Thyroid: No thyroid mass.   Cardiovascular:       Rate and Rhythm: Normal rate and regular rhythm.      Heart sounds: Normal heart sounds.   Pulmonary:      Effort: Pulmonary effort is normal. No respiratory distress.   Abdominal:      General: Bowel sounds are normal. There is no distension.   Genitourinary:     Comments: deferred  Musculoskeletal:         General: Normal range of motion.      Cervical back: Normal range of motion.   Skin:     General: Skin is warm and dry.   Neurological:      Mental Status: She is alert and oriented to person, place, and time.   Psychiatric:         Speech: Speech normal.         Behavior: Behavior normal. Behavior is cooperative.         Thought Content: Thought content normal.        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 Patient denies clinical concerns    Proceed with C4D1 Carboplatin/Taxol (dose changed due to weight increase). Radiation per Rad/Onc. F/u 1 week with repeat labs for cycle 5 chemo              Plan:     Immunodeficiency due to chemotherapy    Malignant neoplasm of lower lobe of left lung    Other orders  -     Cancel: palonosetron (ALOXI) 0.25 mg with Dexamethasone (DECADRON) 12 mg in NS 50 mL IVPB  -     Cancel: diphenhydrAMINE (BENADRYL) 50 mg in 0.9% NaCl 50 mL IVPB  -     Cancel: famotidine (PF) injection 20 mg  -     Cancel: PACLitaxeL (TAXOL) 45 mg/m2 = 78 mg in 0.9% NaCl 250 mL chemo infusion  -     Cancel: CARBOplatin (PARAPLATIN) 195 mg in 0.9% NaCl 269.5 mL chemo infusion  -     Cancel: prochlorperazine injection Soln 5 mg  -     EPINEPHrine (EPIPEN) 0.3 mg/0.3 mL pen  injection 0.3 mg  -     diphenhydrAMINE injection 50 mg  -     hydrocortisone sodium succinate injection 100 mg  -     Cancel: 0.9% NaCl 250 mL flush bag  -     sodium chloride 0.9% flush 10 mL  -     heparin, porcine (PF) 100 unit/mL injection flush 500 Units  -     alteplase injection 2 mg  -     Cancel: CARBOplatin (PARAPLATIN) 210 mg in 0.9% NaCl 271 mL chemo infusion  -     prochlorperazine injection Soln 5 mg            Med Onc Chart Routing      Follow up with physician 1 week. Dr. Moran   Follow up with [facility]    Infusion scheduling note   taxol/carboplatin   Injection scheduling note    Labs CBC, CMP and magnesium   Scheduling:  Preferred lab:  Lab interval:     Imaging None      Pharmacy appointment No pharmacy appointment needed      Other referrals       No additional referrals needed           ANNIE Mcnulty           [1]   Social History  Socioeconomic History    Marital status:     Number of children: 1   Occupational History    Occupation:  aid   Tobacco Use    Smoking status: Former     Current packs/day: 0.00     Average packs/day: 0.5 packs/day for 42.0 years (21.0 ttl pk-yrs)     Types: Cigarettes     Start date: 1970     Quit date: 2012     Years since quittin.2     Passive exposure: Past    Smokeless tobacco: Never   Substance and Sexual Activity    Alcohol use: Not Currently     Alcohol/week: 1.0 standard drink of alcohol     Types: 1 Glasses of wine per week     Comment: Glass red wine once a every 2 weeks    Drug use: Never    Sexual activity: Not Currently     Partners: Female     Birth control/protection: Abstinence, None   Social History Narrative    Single, part-time teacher. Masters degree biology.      Social Drivers of Health     Financial Resource Strain: Patient Declined (2024)    Overall Financial Resource Strain (CARDIA)     Difficulty of Paying Living Expenses: Patient declined   Food Insecurity: Patient Declined (2024)     Hunger Vital Sign     Worried About Running Out of Food in the Last Year: Patient declined     Ran Out of Food in the Last Year: Patient declined   Transportation Needs: No Transportation Needs (12/19/2023)    PRAPARE - Transportation     Lack of Transportation (Medical): No     Lack of Transportation (Non-Medical): No   Physical Activity: Insufficiently Active (12/19/2024)    Exercise Vital Sign     Days of Exercise per Week: 3 days     Minutes of Exercise per Session: 20 min   Stress: Stress Concern Present (12/19/2024)    [employer] [place] [employer] - Occupational Stress Questionnaire     Feeling of Stress : Very much   Housing Stability: High Risk (12/19/2024)    Housing Stability Vital Sign     Unable to Pay for Housing in the Last Year: Yes

## 2025-03-17 ENCOUNTER — PATIENT MESSAGE (OUTPATIENT)
Dept: HEMATOLOGY/ONCOLOGY | Facility: CLINIC | Age: 75
End: 2025-03-17
Payer: MEDICARE

## 2025-03-17 DIAGNOSIS — T66.XXXA RADIATION ESOPHAGITIS: ICD-10-CM

## 2025-03-17 DIAGNOSIS — K20.80 RADIATION ESOPHAGITIS: ICD-10-CM

## 2025-03-17 RX ORDER — OXYCODONE AND ACETAMINOPHEN 10; 325 MG/1; MG/1
1 TABLET ORAL EVERY 4 HOURS PRN
Qty: 40 TABLET | Refills: 0 | Status: CANCELLED | OUTPATIENT
Start: 2025-03-17

## 2025-03-18 ENCOUNTER — DOCUMENTATION ONLY (OUTPATIENT)
Dept: RADIATION ONCOLOGY | Facility: CLINIC | Age: 75
End: 2025-03-18
Payer: MEDICARE

## 2025-03-18 NOTE — PLAN OF CARE
Day 18 of outpatient xrt to the lung. She is tolerating therapy without change or complaint. Will continue to monitor.

## 2025-03-19 DIAGNOSIS — F41.0 PANIC ATTACK AS REACTION TO STRESS: ICD-10-CM

## 2025-03-19 DIAGNOSIS — G47.00 INSOMNIA, UNSPECIFIED TYPE: ICD-10-CM

## 2025-03-19 DIAGNOSIS — F43.0 PANIC ATTACK AS REACTION TO STRESS: ICD-10-CM

## 2025-03-19 DIAGNOSIS — T66.XXXA RADIATION ESOPHAGITIS: ICD-10-CM

## 2025-03-19 DIAGNOSIS — K20.80 RADIATION ESOPHAGITIS: ICD-10-CM

## 2025-03-19 RX ORDER — OXYCODONE AND ACETAMINOPHEN 10; 325 MG/1; MG/1
1 TABLET ORAL EVERY 4 HOURS PRN
Qty: 40 TABLET | Refills: 0 | Status: CANCELLED | OUTPATIENT
Start: 2025-03-19

## 2025-03-19 RX ORDER — LORAZEPAM 1 MG/1
1 TABLET ORAL 2 TIMES DAILY
Qty: 60 TABLET | Refills: 1 | Status: SHIPPED | OUTPATIENT
Start: 2025-03-19

## 2025-03-19 RX ORDER — ESZOPICLONE 3 MG/1
3 TABLET, FILM COATED ORAL NIGHTLY
Qty: 30 TABLET | Refills: 1 | Status: SHIPPED | OUTPATIENT
Start: 2025-03-19

## 2025-03-19 NOTE — TELEPHONE ENCOUNTER
No care due was identified.  [facility] Embedded Care Due Messages. Reference number: [id number].   3/19/2025 9:50:53 AM CDT

## 2025-03-21 ENCOUNTER — INFUSION (OUTPATIENT)
Dept: INFUSION THERAPY | Facility: HOSPITAL | Age: 75
End: 2025-03-21
Attending: INTERNAL MEDICINE
Payer: MEDICARE

## 2025-03-21 ENCOUNTER — DOCUMENTATION ONLY (OUTPATIENT)
Dept: HEMATOLOGY/ONCOLOGY | Facility: CLINIC | Age: 75
End: 2025-03-21

## 2025-03-21 ENCOUNTER — OFFICE VISIT (OUTPATIENT)
Dept: HEMATOLOGY/ONCOLOGY | Facility: CLINIC | Age: 75
End: 2025-03-21
Payer: MEDICARE

## 2025-03-21 ENCOUNTER — LAB VISIT (OUTPATIENT)
Dept: LAB | Facility: HOSPITAL | Age: 75
End: 2025-03-21
Attending: INTERNAL MEDICINE
Payer: MEDICARE

## 2025-03-21 VITALS
HEIGHT: 63 IN | TEMPERATURE: 98 F | SYSTOLIC BLOOD PRESSURE: 139 MMHG | WEIGHT: 150.81 LBS | HEART RATE: 92 BPM | BODY MASS INDEX: 26.72 KG/M2 | RESPIRATION RATE: 18 BRPM | DIASTOLIC BLOOD PRESSURE: 88 MMHG | OXYGEN SATURATION: 96 %

## 2025-03-21 VITALS
OXYGEN SATURATION: 98 % | WEIGHT: 150.81 LBS | SYSTOLIC BLOOD PRESSURE: 121 MMHG | DIASTOLIC BLOOD PRESSURE: 78 MMHG | HEART RATE: 92 BPM | BODY MASS INDEX: 26.72 KG/M2 | TEMPERATURE: 98 F | HEIGHT: 63 IN

## 2025-03-21 DIAGNOSIS — C34.32 MALIGNANT NEOPLASM OF LOWER LOBE OF LEFT LUNG: Primary | ICD-10-CM

## 2025-03-21 DIAGNOSIS — D84.821 IMMUNODEFICIENCY DUE TO CHEMOTHERAPY: ICD-10-CM
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
## 2025-03-21 DIAGNOSIS — E61.1 IRON DEFICIENCY: ICD-10-CM

## 2025-03-21 DIAGNOSIS — Z79.899 IMMUNODEFICIENCY DUE TO CHEMOTHERAPY: ICD-10-CM

## 2025-03-21 DIAGNOSIS — D84.822 IMMUNODEFICIENCY SECONDARY TO RADIATION THERAPY: ICD-10-CM

## 2025-03-21 DIAGNOSIS — C34.92 ADENOCARCINOMA OF LEFT LUNG: ICD-10-CM

## 2025-03-21 DIAGNOSIS — T45.1X5A IMMUNODEFICIENCY DUE TO CHEMOTHERAPY: ICD-10-CM

## 2025-03-21 LAB
ALBUMIN SERPL BCP-MCNC: 3.3 G/DL (ref 3.5–5.2)
ALP SERPL-CCNC: 73 U/L (ref 40–150)
ALT SERPL W/O P-5'-P-CCNC: 39 U/L (ref 10–44)
ANION GAP SERPL CALC-SCNC: 5 MMOL/L (ref 8–16)
AST SERPL-CCNC: 22 U/L (ref 10–40)
BASOPHILS # BLD AUTO: 0.02 K/UL (ref 0–0.2)
BASOPHILS NFR BLD: 0.9 % (ref 0–1.9)
BILIRUB SERPL-MCNC: 0.2 MG/DL (ref 0.1–1)
BUN SERPL-MCNC: 11 MG/DL (ref 8–23)
CALCIUM SERPL-MCNC: 9.1 MG/DL (ref 8.7–10.5)
CHLORIDE SERPL-SCNC: 109 MMOL/L (ref 95–110)
CO2 SERPL-SCNC: 27 MMOL/L (ref 23–29)
CREAT SERPL-MCNC: 0.6 MG/DL (ref 0.5–1.4)
DIFFERENTIAL METHOD BLD: ABNORMAL
EOSINOPHIL # BLD AUTO: 0 K/UL (ref 0–0.5)
EOSINOPHIL NFR BLD: 0.4 % (ref 0–8)
ERYTHROCYTE [DISTWIDTH] IN BLOOD BY AUTOMATED COUNT: 19.3 % (ref 11.5–14.5)
EST. GFR  (NO RACE VARIABLE): >60 ML/MIN/1.73 M^2
GLUCOSE SERPL-MCNC: 88 MG/DL (ref 70–110)
HCT VFR BLD AUTO: 34.9 % (ref 37–48.5)
HGB BLD-MCNC: 11.4 G/DL (ref 12–16)
IMM GRANULOCYTES # BLD AUTO: 0.01 K/UL (ref 0–0.04)
IMM GRANULOCYTES NFR BLD AUTO: 0.4 % (ref 0–0.5)
LYMPHOCYTES # BLD AUTO: 0.5 K/UL (ref 1–4.8)
LYMPHOCYTES NFR BLD: 21.5 % (ref 18–48)
MAGNESIUM SERPL-MCNC: 1.8 MG/DL (ref 1.6–2.6)
MCH RBC QN AUTO: 25.7 PG (ref 27–31)
MCHC RBC AUTO-ENTMCNC: 32.7 G/DL (ref 32–36)
MCV RBC AUTO: 79 FL (ref 82–98)
MONOCYTES # BLD AUTO: 0.3 K/UL (ref 0.3–1)
MONOCYTES NFR BLD: 13 % (ref 4–15)
NEUTROPHILS # BLD AUTO: 1.4 K/UL (ref 1.8–7.7)
NEUTROPHILS NFR BLD: 63.8 % (ref 38–73)
NRBC BLD-RTO: 0 /100 WBC
PLATELET # BLD AUTO: 160 K/UL (ref 150–450)
PMV BLD AUTO: 8.8 FL (ref 9.2–12.9)
POTASSIUM SERPL-SCNC: 3.7 MMOL/L (ref 3.5–5.1)
PROT SERPL-MCNC: 7 G/DL (ref 6–8.4)
RBC # BLD AUTO: 4.43 M/UL (ref 4–5.4)
SODIUM SERPL-SCNC: 141 MMOL/L (ref 136–145)
WBC # BLD AUTO: 2.23 K/UL (ref 3.9–12.7)

## 2025-03-21 PROCEDURE — 25000003 PHARM REV CODE 250: Performed by: INTERNAL MEDICINE

## 2025-03-21 PROCEDURE — 99999 PR PBB SHADOW E&M-EST. PATIENT-LVL V: CPT | Mod: PBBFAC,,, | Performed by: INTERNAL MEDICINE

## 2025-03-21 PROCEDURE — 85025 COMPLETE CBC W/AUTO DIFF WBC: CPT | Performed by: INTERNAL MEDICINE

## 2025-03-21 PROCEDURE — 83735 ASSAY OF MAGNESIUM: CPT | Performed by: INTERNAL MEDICINE

## 2025-03-21 PROCEDURE — 96417 CHEMO IV INFUS EACH ADDL SEQ: CPT

## 2025-03-21 PROCEDURE — 96367 TX/PROPH/DG ADDL SEQ IV INF: CPT

## 2025-03-21 PROCEDURE — 80053 COMPREHEN METABOLIC PANEL: CPT | Performed by: INTERNAL MEDICINE

## 2025-03-21 PROCEDURE — 96375 TX/PRO/DX INJ NEW DRUG ADDON: CPT

## 2025-03-21 PROCEDURE — 63600175 PHARM REV CODE 636 W HCPCS: Performed by: INTERNAL MEDICINE

## 2025-03-21 PROCEDURE — 36415 COLL VENOUS BLD VENIPUNCTURE: CPT | Performed by: INTERNAL MEDICINE

## 2025-03-21 PROCEDURE — 96413 CHEMO IV INFUSION 1 HR: CPT

## 2025-03-21 RX ORDER — SODIUM CHLORIDE 0.9 % (FLUSH) 0.9 %
10 SYRINGE (ML) INJECTION
Status: CANCELLED | OUTPATIENT
Start: 2025-03-22

## 2025-03-21 RX ORDER — HEPARIN 100 UNIT/ML
500 SYRINGE INTRAVENOUS
Status: CANCELLED | OUTPATIENT
Start: 2025-03-22

## 2025-03-21 RX ORDER — PROCHLORPERAZINE EDISYLATE 5 MG/ML
5 INJECTION INTRAMUSCULAR; INTRAVENOUS ONCE AS NEEDED
Status: CANCELLED | OUTPATIENT
Start: 2025-03-22

## 2025-03-21 RX ORDER — FAMOTIDINE 10 MG/ML
20 INJECTION, SOLUTION INTRAVENOUS
Status: CANCELLED | OUTPATIENT
Start: 2025-03-22

## 2025-03-21 RX ORDER — FAMOTIDINE 10 MG/ML
20 INJECTION, SOLUTION INTRAVENOUS
Status: COMPLETED | OUTPATIENT
Start: 2025-03-21 | End: 2025-03-21

## 2025-03-21 RX ORDER — DIPHENHYDRAMINE HYDROCHLORIDE 50 MG/ML
50 INJECTION, SOLUTION INTRAMUSCULAR; INTRAVENOUS ONCE AS NEEDED
Status: CANCELLED | OUTPATIENT
Start: 2025-03-22

## 2025-03-21 RX ORDER — EPINEPHRINE 0.3 MG/.3ML
0.3 INJECTION SUBCUTANEOUS ONCE AS NEEDED
Status: CANCELLED | OUTPATIENT
Start: 2025-03-22

## 2025-03-21 RX ADMIN — SODIUM CHLORIDE 0.25 MG: 9 INJECTION, SOLUTION INTRAVENOUS at 02:03

## 2025-03-21 RX ADMIN — FAMOTIDINE 20 MG: 10 INJECTION, SOLUTION INTRAVENOUS at 02:03

## 2025-03-21 RX ADMIN — SODIUM CHLORIDE 50 MG: 9 INJECTION, SOLUTION INTRAVENOUS at 02:03

## 2025-03-21 RX ADMIN — CARBOPLATIN 210 MG: 10 INJECTION, SOLUTION INTRAVENOUS at 04:03

## 2025-03-21 RX ADMIN — PACLITAXEL 84 MG: 6 INJECTION, SOLUTION INTRAVENOUS at 03:03

## 2025-03-21 NOTE — PROGRESS NOTES
Subjective:       Patient ID: Cassandra Campos is a 74 y.o. female.    Chief Complaint: Results, Chemotherapy, and Lung Cancer    HPI:  74-year-old female history of stage III non-small cell lung carcinoma presents for weekly carboplatin Taxol patient denies nausea vomiting chills night sweats tolerating therapy well concurrent chemoradiation ECOG status 1    Past Medical History:   Diagnosis Date    Arthritis     hands    Bilateral bunions     Borderline glaucoma     De Quervain's disease (radial styloid tenosynovitis)     Gastritis     upper GI 2/2017    Hydradenitis     Hyperlipidemia     Hypertension     Insomnia     Lung cancer     Migraines 02/01/2000    Nasal septum perforation     Obesity     Pneumonia     Restrictive airway disease     Sleep apnea     SVT (supraventricular tachycardia) 09/2013    Trigger finger     Type 2 diabetes mellitus 2012     am 02/01/2024     Family History   Problem Relation Name Age of Onset    Prostate cancer Brother      Diabetes Maternal Aunt Alondra Campos     Diabetes Cousin      Hypertension Maternal Grandmother Portia Orosco      Social History[1]  Past Surgical History:   Procedure Laterality Date    AXILLARY HIDRADENITIS EXCISION Bilateral     BONE EXOSTOSIS EXCISION Right 07/25/2018    Procedure: EXCISION, EXOSTOSIS;  Surgeon: Jayro Pedraza Sr., MD;  Location: [facility];  Service: Orthopedics;  Laterality: Right;    BREAST BIOPSY Bilateral     both benign    BREAST SURGERY  07/1998    BRONCHOSCOPY Bilateral 1/15/2025    Procedure: Bronchoscopy - insert lighted tube into airway to take a biopsy of lung;  Surgeon: Adrian Robin MD;  Location: [facility];  Service: Pulmonary;  Laterality: Bilateral;    CARPAL TUNNEL RELEASE      bilateral    CARPAL TUNNEL RELEASE Right 02/09/2024    Procedure: RELEASE, CARPAL TUNNEL;  Surgeon: Jaime Oswald MD;  Location: [facility] OR;  Service: Orthopedics;  Laterality: Right;    CARPAL TUNNEL RELEASE Left 03/08/2024     Procedure: RELEASE, CARPAL TUNNEL;  Surgeon: Jaime Oswald MD;  Location: [facility];  Service: Orthopedics;  Laterality: Left;    CATARACT EXTRACTION Bilateral     OU     SECTION  1979    CHOLECYSTECTOMY  2014    COLONOSCOPY N/A 10/02/2020    Procedure: COLONOSCOPY;  Surgeon: Tushar Edwards MD;  Location: [facility];  Service: Endoscopy;  Laterality: N/A;    COLONOSCOPY W/ POLYPECTOMY  10/02/2020    Polyps x3, repeat 5 years; Tushar Edwards MD     CYST REMOVAL  2015    sebaceous cyst removed from face    DE QUERVAIN'S RELEASE Left 2020    Procedure: RELEASE, HAND, FOR DEQUERVAIN'S TENOSYNOVITIS;  Surgeon: Jaime Oswald MD;  Location: [facility] OR;  Service: Orthopedics;  Laterality: Left;    DE QUERVAIN'S RELEASE Right 2020    Procedure: RELEASE, HAND, FOR DEQUERVAIN'S TENOSYNOVITIS;  Surgeon: Jaime Oswald MD;  Location: [facility];  Service: Orthopedics;  Laterality: Right;    ENDOBRONCHIAL ULTRASOUND Bilateral 04/10/2024    Procedure: ENDOBRONCHIAL ULTRASOUND (EBUS);  Surgeon: Adrian Robin MD;  Location: [facility];  Service: Pulmonary;  Laterality: Bilateral;    ENDOBRONCHIAL ULTRASOUND Bilateral 1/15/2025    Procedure: ENDOBRONCHIAL ULTRASOUND (EBUS);  Surgeon: Adrian Robin MD;  Location: [facility];  Service: Pulmonary;  Laterality: Bilateral;    EYE SURGERY      gastric sleeve  2017    Dr. Watson    INJECTION OF ANESTHETIC AGENT AROUND MULTIPLE INTERCOSTAL NERVES  5/10/2024    Procedure: BLOCK, NERVE, INTERCOSTAL, 2 OR MORE;  Surgeon: Mike Nuñez MD;  Location: [facility];  Service: Cardiothoracic;;    KNEE SURGERY Right     OLECRANON BURSECTOMY Right 2018    Procedure: BURSECTOMY, OLECRANON;  Surgeon: Jayro Pedraza Sr., MD;  Location: [facility];  Service: Orthopedics;  Laterality: Right;    SURGICAL REMOVAL OF BUNION WITH OSTEOTOMY OF METATARSAL BONE Left 05/10/2019    Procedure: BUNIONECTOMY, WITH METATARSAL OSTEOTOMY;  Surgeon: Srinivasan  CYNTHIA Villanueva DPM;  Location: [facility] OR;  Service: Podiatry;  Laterality: Left;    SURGICAL REMOVAL OF BUNION WITH OSTEOTOMY OF METATARSAL BONE Right 06/28/2019    Procedure: BUNIONECTOMY, WITH METATARSAL OSTEOTOMY;  Surgeon: Srinivasan Villanueva DPM;  Location: [facility] OR;  Service: Podiatry;  Laterality: Right;    SURGICAL REMOVAL OF LYMPH NODE Left 5/10/2024    Procedure: EXCISION, LYMPH NODE;  Surgeon: Mike Nuñez MD;  Location: [facility] OR;  Service: Cardiothoracic;  Laterality: Left;    TONSILLECTOMY, ADENOIDECTOMY  1980s    TRANSESOPHAGEAL ECHOCARDIOGRAM WITH POSSIBLE CARDIOVERSION (LAKESHIA W/ POSS CARDIOVERSION) N/A 5/15/2024    Procedure: Transesophageal echo (LAKESHIA) intra-procedure log documentation;  Surgeon: Twan Pelaez MD;  Location: [facility] CATH LAB;  Service: Cardiology;  Laterality: N/A;    TRIGGER FINGER RELEASE Right 04/01/2015    Dr. Milo HALL ROBOTIC RATS,WITH LOBECTOMY,LUNG Left 5/10/2024    Procedure: XI ROBOTIC RATS,WITH LOBECTOMY,LUNG;  Surgeon: Mike Nuñez MD;  Location: [facility] OR;  Service: Cardiothoracic;  Laterality: Left;  Lingulectomy       Labs:  Lab Results   Component Value Date    WBC 2.23 (L) 03/21/2025    HGB 11.4 (L) 03/21/2025    HCT 34.9 (L) 03/21/2025    MCV 79 (L) 03/21/2025     03/21/2025     BMP  Lab Results   Component Value Date     03/21/2025    K 3.7 03/21/2025     03/21/2025    CO2 27 03/21/2025    BUN 11 03/21/2025    CREATININE 0.6 03/21/2025    CALCIUM 9.1 03/21/2025    ANIONGAP 5 (L) 03/21/2025    ESTGFRAFRICA >60 06/21/2022    EGFRNONAA >60 06/21/2022     Lab Results   Component Value Date    ALT 39 03/21/2025    AST 22 03/21/2025    ALKPHOS 73 03/21/2025    BILITOT 0.2 03/21/2025       Lab Results   Component Value Date    IRON 112 09/12/2022    TIBC 448 11/28/2016    FERRITIN 35 09/12/2022     Lab Results   Component Value Date    DYSHPPMQ41 1440 (H) 08/14/2018     Lab Results   Component Value Date    FOLATE 15.5 11/28/2016     Lab Results   Component  Value Date    TSH 2.314 03/07/2025         Review of Systems   Constitutional:  Negative for activity change, appetite change, chills, diaphoresis, fatigue, fever and unexpected weight change.   HENT:  Negative for congestion, dental problem, drooling, ear discharge, ear pain, facial swelling, hearing loss, mouth sores, nosebleeds, postnasal drip, rhinorrhea, sinus pressure, sneezing, sore throat, tinnitus, trouble swallowing and voice change.    Eyes:  Negative for photophobia, pain, discharge, redness, itching and visual disturbance.   Respiratory:  Negative for cough, choking, chest tightness, shortness of breath, wheezing and stridor.    Cardiovascular:  Negative for chest pain, palpitations and leg swelling.   Gastrointestinal:  Negative for abdominal distention, abdominal pain, anal bleeding, blood in stool, constipation, diarrhea, nausea, rectal pain and vomiting.   Endocrine: Negative for cold intolerance, heat intolerance, polydipsia, polyphagia and polyuria.   Genitourinary:  Negative for decreased urine volume, difficulty urinating, dyspareunia, dysuria, enuresis, flank pain, frequency, genital sores, hematuria, menstrual problem, pelvic pain, urgency, vaginal bleeding, vaginal discharge and vaginal pain.   Musculoskeletal:  Negative for arthralgias, back pain, gait problem, joint swelling, myalgias, neck pain and neck stiffness.   Skin:  Negative for color change, pallor and rash.   Allergic/Immunologic: Negative for environmental allergies, food allergies and immunocompromised state.   Neurological:  Negative for dizziness, tremors, seizures, syncope, facial asymmetry, speech difficulty, weakness, light-headedness, numbness and headaches.   Hematological:  Negative for adenopathy. Does not bruise/bleed easily.   Psychiatric/Behavioral:  Negative for agitation, behavioral problems, confusion, decreased concentration, dysphoric mood, hallucinations, self-injury, sleep disturbance and suicidal ideas. The  patient is not nervous/anxious and is not hyperactive.        Objective:      Physical Exam  Vitals reviewed.   Constitutional:       General: She is not in acute distress.     Appearance: She is well-developed. She is not diaphoretic.   HENT:      Head: Normocephalic and atraumatic.      Right Ear: External ear normal.      Left Ear: External ear normal.      Nose: Nose normal.      Right Sinus: No maxillary sinus tenderness or frontal sinus tenderness.      Left Sinus: No maxillary sinus tenderness or frontal sinus tenderness.      Mouth/Throat:      Pharynx: No oropharyngeal exudate.   Eyes:      General: Lids are normal. No scleral icterus.        Right eye: No discharge.         Left eye: No discharge.      Conjunctiva/sclera: Conjunctivae normal.      Right eye: Right conjunctiva is not injected. No hemorrhage.     Left eye: Left conjunctiva is not injected. No hemorrhage.     Pupils: Pupils are equal, round, and reactive to light.   Neck:      Thyroid: No thyromegaly.      Vascular: No JVD.      Trachea: No tracheal deviation.   Cardiovascular:      Rate and Rhythm: Normal rate.   Pulmonary:      Effort: Pulmonary effort is normal. No respiratory distress.      Breath sounds: No stridor.   Chest:      Chest wall: No tenderness.   Abdominal:      General: Bowel sounds are normal. There is no distension.      Palpations: Abdomen is soft. There is no hepatomegaly, splenomegaly or mass.      Tenderness: There is no abdominal tenderness. There is no rebound.   Musculoskeletal:         General: No tenderness. Normal range of motion.      Cervical back: Normal range of motion and neck supple.   Lymphadenopathy:      Cervical: No cervical adenopathy.      Upper Body:      Right upper body: No supraclavicular adenopathy.      Left upper body: No supraclavicular adenopathy.   Skin:     General: Skin is dry.      Findings: No erythema or rash.   Neurological:      Mental Status: She is alert and oriented to person,  place, and time.      Cranial Nerves: No cranial nerve deficit.      Coordination: Coordination normal.   Psychiatric:         Behavior: Behavior normal.         Thought Content: Thought content normal.         Judgment: Judgment normal.             Assessment:      1. Malignant neoplasm of lower lobe of left lung    2. Immunodeficiency secondary to radiation therapy    3. Immunodeficiency due to chemotherapy    4. Iron deficiency           Med Onc Chart Routing      Follow up with physician . Return to clinic in one-week for weekly carboplatin Taxol CBC CMP can be seen APAP MD alternating   Follow up with [facility]    Infusion scheduling note    Injection scheduling note Weekly carboplatin Taxol   Labs    Imaging    Pharmacy appointment    Other referrals                   Plan:     Proceed with weekly carboplatin and Taxol on weekly basis alternating APAP MD.  Till completion.  Discussed implications tolerating therapy well denies nausea vomiting fevers chills night sweats        Emerson Moran Jr, MD FACP         [1]   Social History  Socioeconomic History    Marital status:     Number of children: 1   Occupational History    Occupation:  aid   Tobacco Use    Smoking status: Former     Current packs/day: 0.00     Average packs/day: 0.5 packs/day for 42.0 years (21.0 ttl pk-yrs)     Types: Cigarettes     Start date: 1970     Quit date: 2012     Years since quittin.2     Passive exposure: Past    Smokeless tobacco: Never   Substance and Sexual Activity    Alcohol use: Not Currently     Alcohol/week: 1.0 standard drink of alcohol     Types: 1 Glasses of wine per week     Comment: Glass red wine once a every 2 weeks    Drug use: Never    Sexual activity: Not Currently     Partners: Female     Birth control/protection: Abstinence, None   Social History Narrative    Single, part-time teacher. Masters degree biology.      Social Drivers of Health     Financial Resource Strain: Patient  Declined (12/19/2024)    Overall Financial Resource Strain (CARDIA)     Difficulty of Paying Living Expenses: Patient declined   Food Insecurity: Patient Declined (12/19/2024)    Hunger Vital Sign     Worried About Running Out of Food in the Last Year: Patient declined     Ran Out of Food in the Last Year: Patient declined   Transportation Needs: No Transportation Needs (12/19/2023)    PRAPARE - Transportation     Lack of Transportation (Medical): No     Lack of Transportation (Non-Medical): No   Physical Activity: Insufficiently Active (12/19/2024)    Exercise Vital Sign     Days of Exercise per Week: 3 days     Minutes of Exercise per Session: 20 min   Stress: Stress Concern Present (12/19/2024)    [employer] [place] [employer] - Occupational Stress Questionnaire     Feeling of Stress : Very much   Housing Stability: High Risk (12/19/2024)    Housing Stability Vital Sign     Unable to Pay for Housing in the Last Year: Yes

## 2025-03-21 NOTE — DISCHARGE INSTRUCTIONS
.[facility]  [address]  [address]  [phone] phone     [phone] fax  Hours of Operation: Monday- Friday 8:00am- 5:00pm  After hours phone  [phone]  Hematology / Oncology Physicians on call    Dr. Dean Smith        Nurse Practitioners:    Joanie Ceballos, NAKIA Farrell, NAKIA Chatterjee, NAKIA Mehta, NAKIA Bennett, PA      Please don't hesitate to call if you have any concerns.

## 2025-03-21 NOTE — NURSING
Pt tolerated chemo well. No adverse reaction noted. Pt education reinforced on chemo regimen, side effects, what to expect, and when to call . Pt verbalized understanding. I reviewed pt calendar w/ pt and understanding verbalized.

## 2025-03-24 ENCOUNTER — DOCUMENTATION ONLY (OUTPATIENT)
Dept: RADIATION ONCOLOGY | Facility: CLINIC | Age: 75
End: 2025-03-24
Payer: MEDICARE

## 2025-03-24 NOTE — PLAN OF CARE
Day 23 of outpatient xrt to the lung.  Pain continues with some edge esophagitis but is doing fairly well. She receives her last cycle of chemotherapy this Friday. Will continue to monitor.

## 2025-03-25 NOTE — PROGRESS NOTES
Office Visit  3/21/2025  [facility] - Hematol Oncol 2nd Fl  Default Flowsheet Data (all recorded)    Distress Management    Row Name 03/21/25 1316       Distress Management Score   Distress Score 7       Physical Concerns   Physical Concerns Sleep;Memory or concentration           SW briefly spoke with pt while waiting in lobby. Pt did not have any needs/concerns at this time. SW will remain available.

## 2025-03-26 DIAGNOSIS — T66.XXXA RADIATION ESOPHAGITIS: ICD-10-CM

## 2025-03-26 DIAGNOSIS — K20.80 RADIATION ESOPHAGITIS: ICD-10-CM

## 2025-03-26 RX ORDER — OXYCODONE AND ACETAMINOPHEN 10; 325 MG/1; MG/1
1 TABLET ORAL EVERY 4 HOURS PRN
Qty: 40 TABLET | Refills: 0 | Status: SHIPPED | OUTPATIENT
Start: 2025-03-26

## 2025-03-28 ENCOUNTER — LAB VISIT (OUTPATIENT)
Dept: LAB | Facility: HOSPITAL | Age: 75
End: 2025-03-28
Attending: INTERNAL MEDICINE
Payer: MEDICARE

## 2025-03-28 ENCOUNTER — OFFICE VISIT (OUTPATIENT)
Dept: HEMATOLOGY/ONCOLOGY | Facility: CLINIC | Age: 75
End: 2025-03-28
Payer: MEDICARE

## 2025-03-28 ENCOUNTER — INFUSION (OUTPATIENT)
Dept: INFUSION THERAPY | Facility: HOSPITAL | Age: 75
End: 2025-03-28
Attending: INTERNAL MEDICINE
Payer: MEDICARE

## 2025-03-28 VITALS
OXYGEN SATURATION: 97 % | RESPIRATION RATE: 16 BRPM | HEART RATE: 103 BPM | HEIGHT: 63 IN | BODY MASS INDEX: 26.25 KG/M2 | TEMPERATURE: 97 F | SYSTOLIC BLOOD PRESSURE: 133 MMHG | WEIGHT: 148.13 LBS | DIASTOLIC BLOOD PRESSURE: 85 MMHG

## 2025-03-28 VITALS
HEART RATE: 109 BPM | TEMPERATURE: 98 F | HEIGHT: 63 IN | OXYGEN SATURATION: 97 % | SYSTOLIC BLOOD PRESSURE: 107 MMHG | DIASTOLIC BLOOD PRESSURE: 74 MMHG | BODY MASS INDEX: 26.25 KG/M2 | WEIGHT: 148.13 LBS

## 2025-03-28 DIAGNOSIS — Z79.899 IMMUNODEFICIENCY DUE TO CHEMOTHERAPY: ICD-10-CM

## 2025-03-28 DIAGNOSIS — T45.1X5A IMMUNODEFICIENCY DUE TO CHEMOTHERAPY: ICD-10-CM

## 2025-03-28 DIAGNOSIS — D84.821 IMMUNODEFICIENCY DUE TO CHEMOTHERAPY: ICD-10-CM

## 2025-03-28 DIAGNOSIS — R94.6 ABNORMAL RESULTS OF THYROID FUNCTION STUDIES: ICD-10-CM
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
## 2025-03-28 DIAGNOSIS — C34.32 MALIGNANT NEOPLASM OF LOWER LOBE OF LEFT LUNG: Primary | ICD-10-CM

## 2025-03-28 DIAGNOSIS — D84.822 IMMUNODEFICIENCY SECONDARY TO RADIATION THERAPY: ICD-10-CM

## 2025-03-28 DIAGNOSIS — C34.92 ADENOCARCINOMA OF LEFT LUNG: ICD-10-CM

## 2025-03-28 LAB
ABSOLUTE EOSINOPHIL (OHS): 0.01 K/UL
ABSOLUTE MONOCYTE (OHS): 0.26 K/UL (ref 0.3–1)
ABSOLUTE NEUTROPHIL COUNT (OHS): 1.98 K/UL (ref 1.8–7.7)
ALBUMIN SERPL BCP-MCNC: 3.3 G/DL (ref 3.5–5.2)
ALP SERPL-CCNC: 84 UNIT/L (ref 40–150)
ALT SERPL W/O P-5'-P-CCNC: 29 UNIT/L (ref 10–44)
ANION GAP (OHS): 8 MMOL/L (ref 8–16)
AST SERPL-CCNC: 20 UNIT/L (ref 11–45)
BASOPHILS # BLD AUTO: 0.02 K/UL
BASOPHILS NFR BLD AUTO: 0.7 %
BILIRUB SERPL-MCNC: 0.4 MG/DL (ref 0.1–1)
BUN SERPL-MCNC: 6 MG/DL (ref 8–23)
CALCIUM SERPL-MCNC: 9.4 MG/DL (ref 8.7–10.5)
CHLORIDE SERPL-SCNC: 106 MMOL/L (ref 95–110)
CO2 SERPL-SCNC: 25 MMOL/L (ref 23–29)
CREAT SERPL-MCNC: 0.6 MG/DL (ref 0.5–1.4)
ERYTHROCYTE [DISTWIDTH] IN BLOOD BY AUTOMATED COUNT: 19.9 % (ref 11.5–14.5)
GFR SERPLBLD CREATININE-BSD FMLA CKD-EPI: >60 ML/MIN/1.73/M2
GLUCOSE SERPL-MCNC: 94 MG/DL (ref 70–110)
HCT VFR BLD AUTO: 36.2 % (ref 37–48.5)
HGB BLD-MCNC: 12.2 GM/DL (ref 12–16)
IMM GRANULOCYTES # BLD AUTO: 0.01 K/UL (ref 0–0.04)
IMM GRANULOCYTES NFR BLD AUTO: 0.4 % (ref 0–0.5)
LYMPHOCYTES # BLD AUTO: 0.56 K/UL (ref 1–4.8)
MAGNESIUM SERPL-MCNC: 1.9 MG/DL (ref 1.6–2.6)
MCH RBC QN AUTO: 26.4 PG (ref 27–50)
MCHC RBC AUTO-ENTMCNC: 33.7 G/DL (ref 32–36)
MCV RBC AUTO: 78 FL (ref 82–98)
NUCLEATED RBC (/100WBC) (OHS): 0 /100 WBC
PLATELET # BLD AUTO: 86 K/UL (ref 150–450)
PMV BLD AUTO: 8.6 FL (ref 9.2–12.9)
POTASSIUM SERPL-SCNC: 3.9 MMOL/L (ref 3.5–5.1)
PROT SERPL-MCNC: 7.6 GM/DL (ref 6–8.4)
RBC # BLD AUTO: 4.62 M/UL (ref 4–5.4)
RELATIVE EOSINOPHIL (OHS): 0.4 %
RELATIVE LYMPHOCYTE (OHS): 19.7 % (ref 18–48)
RELATIVE MONOCYTE (OHS): 9.2 % (ref 4–15)
RELATIVE NEUTROPHIL (OHS): 69.6 % (ref 38–73)
SODIUM SERPL-SCNC: 139 MMOL/L (ref 136–145)
WBC # BLD AUTO: 2.84 K/UL (ref 3.9–12.7)

## 2025-03-28 PROCEDURE — 85025 COMPLETE CBC W/AUTO DIFF WBC: CPT

## 2025-03-28 PROCEDURE — 84460 ALANINE AMINO (ALT) (SGPT): CPT

## 2025-03-28 PROCEDURE — 96417 CHEMO IV INFUS EACH ADDL SEQ: CPT

## 2025-03-28 PROCEDURE — 96367 TX/PROPH/DG ADDL SEQ IV INF: CPT

## 2025-03-28 PROCEDURE — 96413 CHEMO IV INFUSION 1 HR: CPT

## 2025-03-28 PROCEDURE — 25000003 PHARM REV CODE 250: Performed by: INTERNAL MEDICINE

## 2025-03-28 PROCEDURE — 96375 TX/PRO/DX INJ NEW DRUG ADDON: CPT

## 2025-03-28 PROCEDURE — 36415 COLL VENOUS BLD VENIPUNCTURE: CPT

## 2025-03-28 PROCEDURE — 63600175 PHARM REV CODE 636 W HCPCS: Performed by: INTERNAL MEDICINE

## 2025-03-28 PROCEDURE — 99999 PR PBB SHADOW E&M-EST. PATIENT-LVL IV: CPT | Mod: PBBFAC,,, | Performed by: INTERNAL MEDICINE

## 2025-03-28 PROCEDURE — 83735 ASSAY OF MAGNESIUM: CPT

## 2025-03-28 PROCEDURE — 84443 ASSAY THYROID STIM HORMONE: CPT

## 2025-03-28 RX ORDER — EPINEPHRINE 0.3 MG/.3ML
0.3 INJECTION SUBCUTANEOUS ONCE AS NEEDED
Status: DISCONTINUED | OUTPATIENT
Start: 2025-03-28 | End: 2025-03-28 | Stop reason: HOSPADM

## 2025-03-28 RX ORDER — HEPARIN 100 UNIT/ML
500 SYRINGE INTRAVENOUS
Status: CANCELLED | OUTPATIENT
Start: 2025-03-28

## 2025-03-28 RX ORDER — PROCHLORPERAZINE EDISYLATE 5 MG/ML
5 INJECTION INTRAMUSCULAR; INTRAVENOUS ONCE AS NEEDED
Status: CANCELLED | OUTPATIENT
Start: 2025-03-28

## 2025-03-28 RX ORDER — SODIUM CHLORIDE 0.9 % (FLUSH) 0.9 %
10 SYRINGE (ML) INJECTION
Status: CANCELLED | OUTPATIENT
Start: 2025-03-28

## 2025-03-28 RX ORDER — DIPHENHYDRAMINE HYDROCHLORIDE 50 MG/ML
50 INJECTION, SOLUTION INTRAMUSCULAR; INTRAVENOUS ONCE AS NEEDED
Status: DISCONTINUED | OUTPATIENT
Start: 2025-03-28 | End: 2025-03-28 | Stop reason: HOSPADM

## 2025-03-28 RX ORDER — DIPHENHYDRAMINE HYDROCHLORIDE 50 MG/ML
50 INJECTION, SOLUTION INTRAMUSCULAR; INTRAVENOUS ONCE AS NEEDED
Status: CANCELLED | OUTPATIENT
Start: 2025-03-28

## 2025-03-28 RX ORDER — FAMOTIDINE 10 MG/ML
20 INJECTION, SOLUTION INTRAVENOUS
Status: COMPLETED | OUTPATIENT
Start: 2025-03-28 | End: 2025-03-28

## 2025-03-28 RX ORDER — EPINEPHRINE 0.3 MG/.3ML
0.3 INJECTION SUBCUTANEOUS ONCE AS NEEDED
Status: CANCELLED | OUTPATIENT
Start: 2025-03-28

## 2025-03-28 RX ORDER — FAMOTIDINE 10 MG/ML
20 INJECTION, SOLUTION INTRAVENOUS
Status: CANCELLED | OUTPATIENT
Start: 2025-03-28

## 2025-03-28 RX ADMIN — CARBOPLATIN 235 MG: 10 INJECTION, SOLUTION INTRAVENOUS at 02:03

## 2025-03-28 RX ADMIN — SODIUM CHLORIDE 50 MG: 9 INJECTION, SOLUTION INTRAVENOUS at 01:03

## 2025-03-28 RX ADMIN — SODIUM CHLORIDE 0.25 MG: 9 INJECTION, SOLUTION INTRAVENOUS at 01:03

## 2025-03-28 RX ADMIN — SODIUM CHLORIDE: 9 INJECTION, SOLUTION INTRAVENOUS at 01:03

## 2025-03-28 RX ADMIN — FAMOTIDINE 20 MG: 10 INJECTION INTRAVENOUS at 12:03

## 2025-03-28 RX ADMIN — PACLITAXEL 84 MG: 6 INJECTION, SOLUTION INTRAVENOUS at 01:03

## 2025-03-28 NOTE — DISCHARGE INSTRUCTIONS
[facility]  [address] [address]  [address] [address]  [phone] phone     [phone] fax  Hours of Operation: Monday- Friday 8:00am- 5:00pm  After hours phone  [phone]  Hematology / Oncology Physicians on call      VAUGHN Alonso Dr., NP Phaon Dunbar, NP Khelsea Conley, FNP    Please call with any concerns regarding your appointment today.

## 2025-03-28 NOTE — PLAN OF CARE
Problem: Adult Inpatient Plan of Care  Goal: Plan of Care Review  Outcome: Progressing  Flowsheets (Taken 3/28/2025 1340)  Plan of Care Reviewed With: patient  Goal: Patient-Specific Goal (Individualized)  Outcome: Progressing  Flowsheets (Taken 3/28/2025 1340)  Individualized Care Needs: pt likes legs up and blanket  Anxieties, Fears or Concerns: pt denies  Patient/Family-Specific Goals (Include Timeframe): pt to tolerate chemo infusion without reaction     Problem: Chemotherapy Effects  Goal: Anemia Symptom Improvement  Outcome: Progressing  Goal: Safety Maintained  Outcome: Progressing  Goal: Absence of Hematuria  Outcome: Progressing  Goal: Nausea and Vomiting Relief  Outcome: Progressing  Goal: Neurotoxicity Symptom Control  Outcome: Progressing  Goal: Absence of Infection  Outcome: Progressing  Goal: Absence of Bleeding  Outcome: Progressing

## 2025-03-28 NOTE — PROGRESS NOTES
Subjective:       Patient ID: Cassandra Campos is a 74 y.o. female.    Chief Complaint: Results and Lung Cancer    HPI:  74-year-old female returns for final dose of concurrent carboplatin Taxol external beam radiation therapy patient is tolerating therapy well denies nausea chills sweats status    Past Medical History:   Diagnosis Date    Arthritis     hands    Bilateral bunions     Borderline glaucoma     De Quervain's disease (radial styloid tenosynovitis)     Gastritis     upper GI 2/2017    Hydradenitis     Hyperlipidemia     Hypertension     Insomnia     Lung cancer     Migraines 02/01/2000    Nasal septum perforation     Obesity     Pneumonia     Restrictive airway disease     Sleep apnea     SVT (supraventricular tachycardia) 09/2013    Trigger finger     Type 2 diabetes mellitus 2012     am 02/01/2024     Family History   Problem Relation Name Age of Onset    Prostate cancer Brother      Diabetes Maternal Aunt Alondra Campos     Diabetes Cousin      Hypertension Maternal Grandmother Portia Orosco      Social History[1]  Past Surgical History:   Procedure Laterality Date    AXILLARY HIDRADENITIS EXCISION Bilateral     BONE EXOSTOSIS EXCISION Right 07/25/2018    Procedure: EXCISION, EXOSTOSIS;  Surgeon: Jayro Pedraza Sr., MD;  Location: [facility] OR;  Service: Orthopedics;  Laterality: Right;    BREAST BIOPSY Bilateral     both benign    BREAST SURGERY  07/1998    BRONCHOSCOPY Bilateral 1/15/2025    Procedure: Bronchoscopy - insert lighted tube into airway to take a biopsy of lung;  Surgeon: Adrian Robin MD;  Location: [facility];  Service: Pulmonary;  Laterality: Bilateral;    CARPAL TUNNEL RELEASE      bilateral    CARPAL TUNNEL RELEASE Right 02/09/2024    Procedure: RELEASE, CARPAL TUNNEL;  Surgeon: Jaime Oswald MD;  Location: [facility] OR;  Service: Orthopedics;  Laterality: Right;    CARPAL TUNNEL RELEASE Left 03/08/2024    Procedure: RELEASE, CARPAL TUNNEL;  Surgeon: Jaime Oswald,  MD;  Location: [facility];  Service: Orthopedics;  Laterality: Left;    CATARACT EXTRACTION Bilateral     OU     SECTION  1979    CHOLECYSTECTOMY  2014    COLONOSCOPY N/A 10/02/2020    Procedure: COLONOSCOPY;  Surgeon: Tushar Edwards MD;  Location: [facility];  Service: Endoscopy;  Laterality: N/A;    COLONOSCOPY W/ POLYPECTOMY  10/02/2020    Polyps x3, repeat 5 years; Tushar Edwards MD     CYST REMOVAL  2015    sebaceous cyst removed from face    DE QUERVAIN'S RELEASE Left 2020    Procedure: RELEASE, HAND, FOR DEQUERVAIN'S TENOSYNOVITIS;  Surgeon: Jaime Oswald MD;  Location: [facility] OR;  Service: Orthopedics;  Laterality: Left;    DE QUERVAIN'S RELEASE Right 2020    Procedure: RELEASE, HAND, FOR DEQUERVAIN'S TENOSYNOVITIS;  Surgeon: Jaime Oswald MD;  Location: [facility];  Service: Orthopedics;  Laterality: Right;    ENDOBRONCHIAL ULTRASOUND Bilateral 04/10/2024    Procedure: ENDOBRONCHIAL ULTRASOUND (EBUS);  Surgeon: Adrian Robin MD;  Location: [facility];  Service: Pulmonary;  Laterality: Bilateral;    ENDOBRONCHIAL ULTRASOUND Bilateral 1/15/2025    Procedure: ENDOBRONCHIAL ULTRASOUND (EBUS);  Surgeon: Adrian Robin MD;  Location: [facility];  Service: Pulmonary;  Laterality: Bilateral;    EYE SURGERY      gastric sleeve  2017    Dr. Watson    INJECTION OF ANESTHETIC AGENT AROUND MULTIPLE INTERCOSTAL NERVES  5/10/2024    Procedure: BLOCK, NERVE, INTERCOSTAL, 2 OR MORE;  Surgeon: Mike Nuñez MD;  Location: [facility];  Service: Cardiothoracic;;    KNEE SURGERY Right     OLECRANON BURSECTOMY Right 2018    Procedure: BURSECTOMY, OLECRANON;  Surgeon: Jayro Pedraza Sr., MD;  Location: [facility];  Service: Orthopedics;  Laterality: Right;    SURGICAL REMOVAL OF BUNION WITH OSTEOTOMY OF METATARSAL BONE Left 05/10/2019    Procedure: BUNIONECTOMY, WITH METATARSAL OSTEOTOMY;  Surgeon: Srinivasan Villanueva DPM;  Location: [facility];  Service: Podiatry;  Laterality:  Left;    SURGICAL REMOVAL OF BUNION WITH OSTEOTOMY OF METATARSAL BONE Right 06/28/2019    Procedure: BUNIONECTOMY, WITH METATARSAL OSTEOTOMY;  Surgeon: Srinivasan Villanueva DPM;  Location: [facility] OR;  Service: Podiatry;  Laterality: Right;    SURGICAL REMOVAL OF LYMPH NODE Left 5/10/2024    Procedure: EXCISION, LYMPH NODE;  Surgeon: Mike Nuñez MD;  Location: [facility] OR;  Service: Cardiothoracic;  Laterality: Left;    TONSILLECTOMY, ADENOIDECTOMY  1980s    TRANSESOPHAGEAL ECHOCARDIOGRAM WITH POSSIBLE CARDIOVERSION (LAKESHIA W/ POSS CARDIOVERSION) N/A 5/15/2024    Procedure: Transesophageal echo (LAKESHIA) intra-procedure log documentation;  Surgeon: Twan Pelaez MD;  Location: [facility] CATH LAB;  Service: Cardiology;  Laterality: N/A;    TRIGGER FINGER RELEASE Right 04/01/2015    Dr. Milo HALL ROBOTIC RATS,WITH LOBECTOMY,LUNG Left 5/10/2024    Procedure: XI ROBOTIC RATS,WITH LOBECTOMY,LUNG;  Surgeon: Mike Nuñez MD;  Location: [facility] OR;  Service: Cardiothoracic;  Laterality: Left;  Lingulectomy       Labs:  Lab Results   Component Value Date    WBC 2.84 (L) 03/28/2025    HGB 12.2 03/28/2025    HCT 36.2 (L) 03/28/2025    MCV 78 (L) 03/28/2025    PLT 86 (L) 03/28/2025     BMP  Lab Results   Component Value Date     03/28/2025    K 3.9 03/28/2025     03/28/2025    CO2 25 03/28/2025    BUN 6 (L) 03/28/2025    CREATININE 0.6 03/28/2025    CALCIUM 9.4 03/28/2025    ANIONGAP 8 03/28/2025    ESTGFRAFRICA >60 06/21/2022    EGFRNONAA >60 06/21/2022     Lab Results   Component Value Date    ALT 29 03/28/2025    AST 20 03/28/2025    ALKPHOS 84 03/28/2025    BILITOT 0.4 03/28/2025       Lab Results   Component Value Date    IRON 112 09/12/2022    TIBC 448 11/28/2016    FERRITIN 35 09/12/2022     Lab Results   Component Value Date    RSRWJSUF97 1440 (H) 08/14/2018     Lab Results   Component Value Date    FOLATE 15.5 11/28/2016     Lab Results   Component Value Date    TSH 2.314 03/07/2025         Review of Systems    Constitutional:  Negative for activity change, appetite change, chills, diaphoresis, fatigue, fever and unexpected weight change.   HENT:  Negative for congestion, dental problem, drooling, ear discharge, ear pain, facial swelling, hearing loss, mouth sores, nosebleeds, postnasal drip, rhinorrhea, sinus pressure, sneezing, sore throat, tinnitus, trouble swallowing and voice change.    Eyes:  Negative for photophobia, pain, discharge, redness, itching and visual disturbance.   Respiratory:  Negative for cough, choking, chest tightness, shortness of breath, wheezing and stridor.    Cardiovascular:  Negative for chest pain, palpitations and leg swelling.   Gastrointestinal:  Negative for abdominal distention, abdominal pain, anal bleeding, blood in stool, constipation, diarrhea, nausea, rectal pain and vomiting.   Endocrine: Negative for cold intolerance, heat intolerance, polydipsia, polyphagia and polyuria.   Genitourinary:  Negative for decreased urine volume, difficulty urinating, dyspareunia, dysuria, enuresis, flank pain, frequency, genital sores, hematuria, menstrual problem, pelvic pain, urgency, vaginal bleeding, vaginal discharge and vaginal pain.   Musculoskeletal:  Negative for arthralgias, back pain, gait problem, joint swelling, myalgias, neck pain and neck stiffness.   Skin:  Negative for color change, pallor and rash.   Allergic/Immunologic: Negative for environmental allergies, food allergies and immunocompromised state.   Neurological:  Negative for dizziness, tremors, seizures, syncope, facial asymmetry, speech difficulty, weakness, light-headedness, numbness and headaches.   Hematological:  Negative for adenopathy. Does not bruise/bleed easily.   Psychiatric/Behavioral:  Negative for agitation, behavioral problems, confusion, decreased concentration, dysphoric mood, hallucinations, self-injury, sleep disturbance and suicidal ideas. The patient is not nervous/anxious and is not hyperactive.         Objective:      Physical Exam  Vitals reviewed.   Constitutional:       General: She is not in acute distress.     Appearance: She is well-developed. She is not diaphoretic.   HENT:      Head: Normocephalic and atraumatic.      Right Ear: External ear normal.      Left Ear: External ear normal.      Nose: Nose normal.      Right Sinus: No maxillary sinus tenderness or frontal sinus tenderness.      Left Sinus: No maxillary sinus tenderness or frontal sinus tenderness.      Mouth/Throat:      Pharynx: No oropharyngeal exudate.   Eyes:      General: Lids are normal. No scleral icterus.        Right eye: No discharge.         Left eye: No discharge.      Conjunctiva/sclera: Conjunctivae normal.      Right eye: Right conjunctiva is not injected. No hemorrhage.     Left eye: Left conjunctiva is not injected. No hemorrhage.     Pupils: Pupils are equal, round, and reactive to light.   Neck:      Thyroid: No thyromegaly.      Vascular: No JVD.      Trachea: No tracheal deviation.   Cardiovascular:      Rate and Rhythm: Normal rate.   Pulmonary:      Effort: Pulmonary effort is normal. No respiratory distress.      Breath sounds: No stridor.   Chest:      Chest wall: No tenderness.   Abdominal:      General: Bowel sounds are normal. There is no distension.      Palpations: Abdomen is soft. There is no hepatomegaly, splenomegaly or mass.      Tenderness: There is no abdominal tenderness. There is no rebound.   Musculoskeletal:         General: No tenderness. Normal range of motion.      Cervical back: Normal range of motion and neck supple.   Lymphadenopathy:      Cervical: No cervical adenopathy.      Upper Body:      Right upper body: No supraclavicular adenopathy.      Left upper body: No supraclavicular adenopathy.   Skin:     General: Skin is dry.      Findings: No erythema or rash.   Neurological:      Mental Status: She is alert and oriented to person, place, and time.      Cranial Nerves: No cranial nerve deficit.       Coordination: Coordination normal.   Psychiatric:         Behavior: Behavior normal.         Thought Content: Thought content normal.         Judgment: Judgment normal.             Assessment:      1. Malignant neoplasm of lower lobe of left lung    2. Immunodeficiency secondary to radiation therapy    3. Immunodeficiency due to chemotherapy           Med Onc Chart Routing      Follow up with physician . Return to clinic in 1 month CBC CMP TSH will discuss use of maintenance immunotherapy at that point   Follow up with [facility]    Infusion scheduling note    Injection scheduling note    Labs    Imaging    Pharmacy appointment    Other referrals                   Plan:     Counts acceptable ANC greater than a 1000 proceed with cycle 6 day 1 of carboplatin Taxol.  Patient is tolerating therapy well return in 1 month CBC CMP TSH will initiate maintenance immunotherapy at that point discussed implications answered questions        Emerson Moran Jr, MD FACP         [1]   Social History  Socioeconomic History    Marital status:     Number of children: 1   Occupational History    Occupation:  aid   Tobacco Use    Smoking status: Former     Current packs/day: 0.00     Average packs/day: 0.5 packs/day for 42.0 years (21.0 ttl pk-yrs)     Types: Cigarettes     Start date: 1970     Quit date: 2012     Years since quittin.2     Passive exposure: Past    Smokeless tobacco: Never   Substance and Sexual Activity    Alcohol use: Not Currently     Alcohol/week: 1.0 standard drink of alcohol     Types: 1 Glasses of wine per week     Comment: Glass red wine once a every 2 weeks    Drug use: Never    Sexual activity: Not Currently     Partners: Female     Birth control/protection: Abstinence, None   Social History Narrative    Single, part-time teacher. Masters degree biology.      Social Drivers of Health     Financial Resource Strain: Patient Declined (2024)    Overall Financial Resource  Strain (CARDIA)     Difficulty of Paying Living Expenses: Patient declined   Food Insecurity: Patient Declined (12/19/2024)    Hunger Vital Sign     Worried About Running Out of Food in the Last Year: Patient declined     Ran Out of Food in the Last Year: Patient declined   Transportation Needs: No Transportation Needs (12/19/2023)    PRAPARE - Transportation     Lack of Transportation (Medical): No     Lack of Transportation (Non-Medical): No   Physical Activity: Insufficiently Active (12/19/2024)    Exercise Vital Sign     Days of Exercise per Week: 3 days     Minutes of Exercise per Session: 20 min   Stress: Stress Concern Present (12/19/2024)    [employer] - Occupational Stress Questionnaire     Feeling of Stress : Very much   Housing Stability: High Risk (12/19/2024)    Housing Stability Vital Sign     Unable to Pay for Housing in the Last Year: Yes

## 2025-03-29 LAB — TSH SERPL-ACNC: 1.44 UIU/ML (ref 0.4–4)

## 2025-04-01 ENCOUNTER — HOSPITAL ENCOUNTER (OUTPATIENT)
Dept: RADIATION THERAPY | Facility: HOSPITAL | Age: 75
Discharge: HOME | End: 2025-04-01
Attending: SPECIALIST
Payer: MEDICARE

## 2025-04-01 ENCOUNTER — OFFICE VISIT (OUTPATIENT)
Dept: INTERNAL MEDICINE | Facility: CLINIC | Age: 75
End: 2025-04-01
Payer: MEDICARE

## 2025-04-01 ENCOUNTER — DOCUMENTATION ONLY (OUTPATIENT)
Dept: RADIATION ONCOLOGY | Facility: CLINIC | Age: 75
End: 2025-04-01
Payer: MEDICARE

## 2025-04-01 ENCOUNTER — HOSPITAL ENCOUNTER (INPATIENT)
Facility: HOSPITAL | Age: 75
LOS: 2 days | Discharge: HOME | DRG: 871 | End: 2025-04-03
Attending: EMERGENCY MEDICINE | Admitting: HOSPITALIST
Payer: MEDICARE

## 2025-04-01 VITALS
HEART RATE: 125 BPM | BODY MASS INDEX: 34.13 KG/M2 | DIASTOLIC BLOOD PRESSURE: 60 MMHG | HEIGHT: 55 IN | SYSTOLIC BLOOD PRESSURE: 100 MMHG | WEIGHT: 147.5 LBS | OXYGEN SATURATION: 99 % | TEMPERATURE: 99 F

## 2025-04-01 DIAGNOSIS — R50.81 NEUTROPENIC FEVER: ICD-10-CM

## 2025-04-01 DIAGNOSIS — R79.89 TROPONIN LEVEL ELEVATED: ICD-10-CM

## 2025-04-01 DIAGNOSIS — E11.9 CONTROLLED TYPE 2 DIABETES MELLITUS WITHOUT COMPLICATION, WITHOUT LONG-TERM CURRENT USE OF INSULIN: ICD-10-CM

## 2025-04-01 DIAGNOSIS — R65.20 SEVERE SEPSIS: ICD-10-CM

## 2025-04-01 DIAGNOSIS — I10 PRIMARY HYPERTENSION: ICD-10-CM

## 2025-04-01 DIAGNOSIS — J18.9 MULTIFOCAL PNEUMONIA: Primary | ICD-10-CM

## 2025-04-01 DIAGNOSIS — Z13.6 SCREENING FOR CARDIOVASCULAR CONDITION: ICD-10-CM

## 2025-04-01 DIAGNOSIS — A41.9 SEVERE SEPSIS: ICD-10-CM

## 2025-04-01 DIAGNOSIS — F41.0 PANIC ATTACK AS REACTION TO STRESS: ICD-10-CM

## 2025-04-01 DIAGNOSIS — C34.32 MALIGNANT NEOPLASM OF LOWER LOBE OF LEFT LUNG: Primary | ICD-10-CM

## 2025-04-01 DIAGNOSIS — D70.9 NEUTROPENIC FEVER: ICD-10-CM

## 2025-04-01 DIAGNOSIS — F43.0 PANIC ATTACK AS REACTION TO STRESS: ICD-10-CM

## 2025-04-01 DIAGNOSIS — R07.9 CHEST PAIN: ICD-10-CM

## 2025-04-01 DIAGNOSIS — E87.20 LACTIC ACIDOSIS: ICD-10-CM

## 2025-04-01 LAB
ABSOLUTE NEUTROPHIL MANUAL (OHS): 1.2 K/UL
ALBUMIN SERPL BCP-MCNC: 3.1 G/DL (ref 3.5–5.2)
ALP SERPL-CCNC: 115 UNIT/L (ref 40–150)
ALT SERPL W/O P-5'-P-CCNC: 163 UNIT/L (ref 10–44)
ANION GAP (OHS): 12 MMOL/L (ref 8–16)
ANISOCYTOSIS BLD QL SMEAR: SLIGHT
AST SERPL-CCNC: 177 UNIT/L (ref 11–45)
BILIRUB SERPL-MCNC: 0.9 MG/DL (ref 0.1–1)
BILIRUB UR QL STRIP.AUTO: NEGATIVE
BUN SERPL-MCNC: 13 MG/DL (ref 8–23)
CALCIUM SERPL-MCNC: 9.1 MG/DL (ref 8.7–10.5)
CHLORIDE SERPL-SCNC: 100 MMOL/L (ref 95–110)
CLARITY UR: CLEAR
CO2 SERPL-SCNC: 21 MMOL/L (ref 23–29)
COLOR UR AUTO: YELLOW
CREAT SERPL-MCNC: 1 MG/DL (ref 0.5–1.4)
ERYTHROCYTE [DISTWIDTH] IN BLOOD BY AUTOMATED COUNT: 19.5 % (ref 11.5–14.5)
GFR SERPLBLD CREATININE-BSD FMLA CKD-EPI: 59 ML/MIN/1.73/M2
GLUCOSE SERPL-MCNC: 154 MG/DL (ref 70–110)
GLUCOSE UR QL STRIP: NEGATIVE
HCT VFR BLD AUTO: 36.6 % (ref 37–48.5)
HGB BLD-MCNC: 12.3 GM/DL (ref 12–16)
HGB UR QL STRIP: ABNORMAL
HYPOCHROMIA BLD QL SMEAR: ABNORMAL
INFLUENZA A MOLECULAR (OHS): NEGATIVE
INFLUENZA B MOLECULAR (OHS): NEGATIVE
KETONES UR QL STRIP: NEGATIVE
LACTATE SERPL-SCNC: 2.3 MMOL/L (ref 0.5–2.2)
LACTATE SERPL-SCNC: 5.1 MMOL/L (ref 0.5–2.2)
LARGE/GIANT PLATELETS (OHS): PRESENT
LEUKOCYTE ESTERASE UR QL STRIP: NEGATIVE
LYMPHOCYTES NFR BLD MANUAL: 25 % (ref 18–48)
MAGNESIUM SERPL-MCNC: 1.6 MG/DL (ref 1.6–2.6)
MCH RBC QN AUTO: 26.2 PG (ref 27–31)
MCHC RBC AUTO-ENTMCNC: 33.6 G/DL (ref 32–36)
MCV RBC AUTO: 78 FL (ref 82–98)
METAMYELOCYTES NFR BLD MANUAL: 12 %
MONOCYTES NFR BLD MANUAL: 7 % (ref 4–15)
NEUTROPHILS NFR BLD MANUAL: 46 % (ref 38–73)
NEUTS BAND NFR BLD MANUAL: 10 %
NITRITE UR QL STRIP: NEGATIVE
NUCLEATED RBC (/100WBC) (OHS): 0 /100 WBC
OHS QRS DURATION: 74 MS
OHS QTC CALCULATION: 462 MS
PH UR STRIP: 6 [PH]
PLATELET # BLD AUTO: 111 K/UL (ref 150–450)
PLATELET BLD QL SMEAR: ABNORMAL
PMV BLD AUTO: 9 FL (ref 9.2–12.9)
POCT GLUCOSE: 127 MG/DL (ref 70–110)
POTASSIUM SERPL-SCNC: 4.2 MMOL/L (ref 3.5–5.1)
PROCALCITONIN SERPL-MCNC: 6.41 NG/ML
PROT SERPL-MCNC: 7.9 GM/DL (ref 6–8.4)
PROT UR QL STRIP: ABNORMAL
RBC # BLD AUTO: 4.69 M/UL (ref 4–5.4)
SARS-COV-2 RDRP RESP QL NAA+PROBE: NEGATIVE
SODIUM SERPL-SCNC: 133 MMOL/L (ref 136–145)
SP GR UR STRIP: 1.01
STOMATOCYTES BLD QL SMEAR: PRESENT
TARGETS BLD QL SMEAR: ABNORMAL
TROPONIN I SERPL DL<=0.01 NG/ML-MCNC: 0.28 NG/ML
UROBILINOGEN UR STRIP-ACNC: ABNORMAL EU/DL
WBC # BLD AUTO: 2.1 K/UL (ref 3.9–12.7)

## 2025-04-01 PROCEDURE — 63600175 PHARM REV CODE 636 W HCPCS: Performed by: NURSE PRACTITIONER

## 2025-04-01 PROCEDURE — 84145 PROCALCITONIN (PCT): CPT | Performed by: EMERGENCY MEDICINE

## 2025-04-01 PROCEDURE — 3288F FALL RISK ASSESSMENT DOCD: CPT | Mod: CPTII,S$GLB,, | Performed by: FAMILY MEDICINE

## 2025-04-01 PROCEDURE — 25500020 PHARM REV CODE 255: Performed by: EMERGENCY MEDICINE

## 2025-04-01 PROCEDURE — 93005 ELECTROCARDIOGRAM TRACING: CPT

## 2025-04-01 PROCEDURE — 25000003 PHARM REV CODE 250: Performed by: NURSE PRACTITIONER

## 2025-04-01 PROCEDURE — 99214 OFFICE O/P EST MOD 30 MIN: CPT | Mod: S$GLB,,, | Performed by: FAMILY MEDICINE

## 2025-04-01 PROCEDURE — 84484 ASSAY OF TROPONIN QUANT: CPT | Performed by: EMERGENCY MEDICINE

## 2025-04-01 PROCEDURE — 11000001 HC ACUTE MED/SURG PRIVATE ROOM

## 2025-04-01 PROCEDURE — 87502 INFLUENZA DNA AMP PROBE: CPT | Performed by: EMERGENCY MEDICINE

## 2025-04-01 PROCEDURE — 85027 COMPLETE CBC AUTOMATED: CPT | Performed by: EMERGENCY MEDICINE

## 2025-04-01 PROCEDURE — U0002 COVID-19 LAB TEST NON-CDC: HCPCS | Performed by: EMERGENCY MEDICINE

## 2025-04-01 PROCEDURE — 96361 HYDRATE IV INFUSION ADD-ON: CPT

## 2025-04-01 PROCEDURE — 93010 ELECTROCARDIOGRAM REPORT: CPT | Mod: ,,, | Performed by: INTERNAL MEDICINE

## 2025-04-01 PROCEDURE — 3074F SYST BP LT 130 MM HG: CPT | Mod: CPTII,S$GLB,, | Performed by: FAMILY MEDICINE

## 2025-04-01 PROCEDURE — 83735 ASSAY OF MAGNESIUM: CPT | Performed by: EMERGENCY MEDICINE

## 2025-04-01 PROCEDURE — 3078F DIAST BP <80 MM HG: CPT | Mod: CPTII,S$GLB,, | Performed by: FAMILY MEDICINE

## 2025-04-01 PROCEDURE — 96374 THER/PROPH/DIAG INJ IV PUSH: CPT

## 2025-04-01 PROCEDURE — 63600175 PHARM REV CODE 636 W HCPCS: Performed by: EMERGENCY MEDICINE

## 2025-04-01 PROCEDURE — 99285 EMERGENCY DEPT VISIT HI MDM: CPT | Mod: 25

## 2025-04-01 PROCEDURE — 83605 ASSAY OF LACTIC ACID: CPT | Performed by: EMERGENCY MEDICINE

## 2025-04-01 PROCEDURE — 87040 BLOOD CULTURE FOR BACTERIA: CPT | Performed by: EMERGENCY MEDICINE

## 2025-04-01 PROCEDURE — 81003 URINALYSIS AUTO W/O SCOPE: CPT | Performed by: EMERGENCY MEDICINE

## 2025-04-01 PROCEDURE — 85060 BLOOD SMEAR INTERPRETATION: CPT | Mod: ,,, | Performed by: STUDENT IN AN ORGANIZED HEALTH CARE EDUCATION/TRAINING PROGRAM

## 2025-04-01 PROCEDURE — 1159F MED LIST DOCD IN RCRD: CPT | Mod: CPTII,S$GLB,, | Performed by: FAMILY MEDICINE

## 2025-04-01 PROCEDURE — 1101F PT FALLS ASSESS-DOCD LE1/YR: CPT | Mod: CPTII,S$GLB,, | Performed by: FAMILY MEDICINE

## 2025-04-01 PROCEDURE — 99999 PR PBB SHADOW E&M-EST. PATIENT-LVL IV: CPT | Mod: PBBFAC,,, | Performed by: FAMILY MEDICINE

## 2025-04-01 PROCEDURE — 80053 COMPREHEN METABOLIC PANEL: CPT | Performed by: EMERGENCY MEDICINE

## 2025-04-01 PROCEDURE — 25000003 PHARM REV CODE 250: Performed by: EMERGENCY MEDICINE

## 2025-04-01 PROCEDURE — 1126F AMNT PAIN NOTED NONE PRSNT: CPT | Mod: CPTII,S$GLB,, | Performed by: FAMILY MEDICINE

## 2025-04-01 RX ORDER — INSULIN ASPART 100 [IU]/ML
0-5 INJECTION, SOLUTION INTRAVENOUS; SUBCUTANEOUS
Status: DISCONTINUED | OUTPATIENT
Start: 2025-04-01 | End: 2025-04-03 | Stop reason: HOSPADM

## 2025-04-01 RX ORDER — PROMETHAZINE HYDROCHLORIDE 25 MG/1
25 TABLET ORAL EVERY 6 HOURS PRN
Status: DISCONTINUED | OUTPATIENT
Start: 2025-04-01 | End: 2025-04-03 | Stop reason: HOSPADM

## 2025-04-01 RX ORDER — NALOXONE HCL 0.4 MG/ML
0.02 VIAL (ML) INJECTION
Status: DISCONTINUED | OUTPATIENT
Start: 2025-04-01 | End: 2025-04-03 | Stop reason: HOSPADM

## 2025-04-01 RX ORDER — IBUPROFEN 200 MG
16 TABLET ORAL
Status: DISCONTINUED | OUTPATIENT
Start: 2025-04-01 | End: 2025-04-03 | Stop reason: HOSPADM

## 2025-04-01 RX ORDER — ONDANSETRON HYDROCHLORIDE 2 MG/ML
4 INJECTION, SOLUTION INTRAVENOUS EVERY 8 HOURS PRN
Status: DISCONTINUED | OUTPATIENT
Start: 2025-04-01 | End: 2025-04-03 | Stop reason: HOSPADM

## 2025-04-01 RX ORDER — CEFEPIME HYDROCHLORIDE 2 G/1
2 INJECTION, POWDER, FOR SOLUTION INTRAVENOUS
Status: COMPLETED | OUTPATIENT
Start: 2025-04-01 | End: 2025-04-01

## 2025-04-01 RX ORDER — POLYETHYLENE GLYCOL 3350 17 G/17G
17 POWDER, FOR SOLUTION ORAL DAILY PRN
Status: DISCONTINUED | OUTPATIENT
Start: 2025-04-01 | End: 2025-04-03 | Stop reason: HOSPADM

## 2025-04-01 RX ORDER — IBUPROFEN 200 MG
24 TABLET ORAL
Status: DISCONTINUED | OUTPATIENT
Start: 2025-04-01 | End: 2025-04-03 | Stop reason: HOSPADM

## 2025-04-01 RX ORDER — TALC
6 POWDER (GRAM) TOPICAL NIGHTLY PRN
Status: DISCONTINUED | OUTPATIENT
Start: 2025-04-01 | End: 2025-04-02

## 2025-04-01 RX ORDER — ACETAMINOPHEN 650 MG/1
650 SUPPOSITORY RECTAL EVERY 6 HOURS PRN
Status: DISCONTINUED | OUTPATIENT
Start: 2025-04-01 | End: 2025-04-01

## 2025-04-01 RX ORDER — ALUMINUM HYDROXIDE, MAGNESIUM HYDROXIDE, AND SIMETHICONE 1200; 120; 1200 MG/30ML; MG/30ML; MG/30ML
30 SUSPENSION ORAL 4 TIMES DAILY PRN
Status: DISCONTINUED | OUTPATIENT
Start: 2025-04-01 | End: 2025-04-03 | Stop reason: HOSPADM

## 2025-04-01 RX ORDER — ENOXAPARIN SODIUM 100 MG/ML
40 INJECTION SUBCUTANEOUS EVERY 24 HOURS
Status: DISCONTINUED | OUTPATIENT
Start: 2025-04-01 | End: 2025-04-03 | Stop reason: HOSPADM

## 2025-04-01 RX ORDER — CEFEPIME HYDROCHLORIDE 2 G/1
2 INJECTION, POWDER, FOR SOLUTION INTRAVENOUS
Status: DISCONTINUED | OUTPATIENT
Start: 2025-04-01 | End: 2025-04-02

## 2025-04-01 RX ORDER — ACETAMINOPHEN 325 MG/1
650 TABLET ORAL EVERY 6 HOURS PRN
Status: DISCONTINUED | OUTPATIENT
Start: 2025-04-01 | End: 2025-04-01

## 2025-04-01 RX ORDER — GLUCAGON 1 MG
1 KIT INJECTION
Status: DISCONTINUED | OUTPATIENT
Start: 2025-04-01 | End: 2025-04-03 | Stop reason: HOSPADM

## 2025-04-01 RX ORDER — SODIUM CHLORIDE 0.9 % (FLUSH) 0.9 %
3 SYRINGE (ML) INJECTION EVERY 12 HOURS PRN
Status: DISCONTINUED | OUTPATIENT
Start: 2025-04-01 | End: 2025-04-03 | Stop reason: HOSPADM

## 2025-04-01 RX ORDER — SIMETHICONE 80 MG
1 TABLET,CHEWABLE ORAL 4 TIMES DAILY PRN
Status: DISCONTINUED | OUTPATIENT
Start: 2025-04-01 | End: 2025-04-03 | Stop reason: HOSPADM

## 2025-04-01 RX ADMIN — DOXYCYCLINE 100 MG: 100 INJECTION, POWDER, LYOPHILIZED, FOR SOLUTION INTRAVENOUS at 08:04

## 2025-04-01 RX ADMIN — ENOXAPARIN SODIUM 40 MG: 40 INJECTION SUBCUTANEOUS at 08:04

## 2025-04-01 RX ADMIN — SODIUM CHLORIDE 1182 ML: 9 INJECTION, SOLUTION INTRAVENOUS at 12:04

## 2025-04-01 RX ADMIN — CEFEPIME 2 G: 2 INJECTION, POWDER, FOR SOLUTION INTRAVENOUS at 04:04

## 2025-04-01 RX ADMIN — IOHEXOL 100 ML: 350 INJECTION, SOLUTION INTRAVENOUS at 05:04

## 2025-04-01 NOTE — ED PROVIDER NOTES
SCRIBE #1 NOTE: ITato, am scribing for, and in the presence of, Tiny Tai DO. I have scribed the HPI, ROS, and PE.    SCRIBE #2 NOTE: I, Estelle Pavon, am scribing for, and in the presence of,  Rob Grubbs Jr., MD. I have scribed the remaining portions of the note not scribed by Scribe #1.      History     Chief Complaint   Patient presents with    Hypotension     Pt. Reports that her BP was low at the [facility] so she was sent over here.      Review of patient's allergies indicates:  No Known Allergies      History of Present Illness     HPI    4/1/2025, 12:28 PM  History obtained from the patient and medical records      History of Present Illness: Cassandra Campos is a 74 y.o. female patient with a PMHx of HLD, DM type II, pneumonia, hydradenitis, HTN, SVT, and lung cancer who presents to the Emergency Department for evaluation of low BP. Pt was supposed to receive radiation tx with Dr. Zhang today at the [facility] but instead was sent to the ED for further evaluation. Symptoms are constant and moderate in severity. No mitigating or exacerbating factors reported. Associated sxs include CP, SOB, cough, and lightheadedness. Patient denies any N/V/D and all other sxs at this time. No further complaints or concerns at this time.       Arrival mode: Personal Transportation    PCP: Emil Zhang MD        Past Medical History:  Past Medical History:   Diagnosis Date    Arthritis     hands    Bilateral bunions     Borderline glaucoma     De Quervain's disease (radial styloid tenosynovitis)     Gastritis     upper GI 2/2017    Hydradenitis     Hyperlipidemia     Hypertension     Insomnia     Lung cancer     Migraines 02/01/2000    Nasal septum perforation     Obesity     Pneumonia     Restrictive airway disease     Sleep apnea     SVT (supraventricular tachycardia) 09/2013    Trigger finger     Type 2 diabetes mellitus 2012     am 02/01/2024       Past Surgical History:  Past  Surgical History:   Procedure Laterality Date    AXILLARY HIDRADENITIS EXCISION Bilateral     BONE EXOSTOSIS EXCISION Right 2018    Procedure: EXCISION, EXOSTOSIS;  Surgeon: Jayro Pedraza Sr., MD;  Location: [facility];  Service: Orthopedics;  Laterality: Right;    BREAST BIOPSY Bilateral     both benign    BREAST SURGERY  1998    BRONCHOSCOPY Bilateral 1/15/2025    Procedure: Bronchoscopy - insert lighted tube into airway to take a biopsy of lung;  Surgeon: Adrian Robin MD;  Location: [facility];  Service: Pulmonary;  Laterality: Bilateral;    CARPAL TUNNEL RELEASE      bilateral    CARPAL TUNNEL RELEASE Right 2024    Procedure: RELEASE, CARPAL TUNNEL;  Surgeon: Jaime Oswald MD;  Location: [facility];  Service: Orthopedics;  Laterality: Right;    CARPAL TUNNEL RELEASE Left 2024    Procedure: RELEASE, CARPAL TUNNEL;  Surgeon: Jaime Oswald MD;  Location: [facility];  Service: Orthopedics;  Laterality: Left;    CATARACT EXTRACTION Bilateral     OU     SECTION  1979    CHOLECYSTECTOMY  2014    COLONOSCOPY N/A 10/02/2020    Procedure: COLONOSCOPY;  Surgeon: Tushar Edwards MD;  Location: [facility];  Service: Endoscopy;  Laterality: N/A;    COLONOSCOPY W/ POLYPECTOMY  10/02/2020    Polyps x3, repeat 5 years; Tushar Edwards MD     CYST REMOVAL  2015    sebaceous cyst removed from face    DE QUERVAIN'S RELEASE Left 2020    Procedure: RELEASE, HAND, FOR DEQUERVAIN'S TENOSYNOVITIS;  Surgeon: Jaime Oswald MD;  Location: [facility];  Service: Orthopedics;  Laterality: Left;    DE QUERVAIN'S RELEASE Right 2020    Procedure: RELEASE, HAND, FOR DEQUERVAIN'S TENOSYNOVITIS;  Surgeon: Jaime Oswald MD;  Location: [facility];  Service: Orthopedics;  Laterality: Right;    ENDOBRONCHIAL ULTRASOUND Bilateral 04/10/2024    Procedure: ENDOBRONCHIAL ULTRASOUND (EBUS);  Surgeon: Adrian Robin MD;  Location: [facility];  Service: Pulmonary;  Laterality:  Bilateral;    ENDOBRONCHIAL ULTRASOUND Bilateral 1/15/2025    Procedure: ENDOBRONCHIAL ULTRASOUND (EBUS);  Surgeon: Adrian Robin MD;  Location: [facility] ENDO;  Service: Pulmonary;  Laterality: Bilateral;    EYE SURGERY      gastric sleeve  02/13/2017    Dr. Watson    INJECTION OF ANESTHETIC AGENT AROUND MULTIPLE INTERCOSTAL NERVES  5/10/2024    Procedure: BLOCK, NERVE, INTERCOSTAL, 2 OR MORE;  Surgeon: Mike Nuñez MD;  Location: [facility] OR;  Service: Cardiothoracic;;    KNEE SURGERY Right     OLECRANON BURSECTOMY Right 07/25/2018    Procedure: BURSECTOMY, OLECRANON;  Surgeon: Jayro Pedraza Sr., MD;  Location: [facility] OR;  Service: Orthopedics;  Laterality: Right;    SURGICAL REMOVAL OF BUNION WITH OSTEOTOMY OF METATARSAL BONE Left 05/10/2019    Procedure: BUNIONECTOMY, WITH METATARSAL OSTEOTOMY;  Surgeon: Srinivasan Villanueva DPM;  Location: [facility] OR;  Service: Podiatry;  Laterality: Left;    SURGICAL REMOVAL OF BUNION WITH OSTEOTOMY OF METATARSAL BONE Right 06/28/2019    Procedure: BUNIONECTOMY, WITH METATARSAL OSTEOTOMY;  Surgeon: Srinivasan Villanueva DPM;  Location: [facility] OR;  Service: Podiatry;  Laterality: Right;    SURGICAL REMOVAL OF LYMPH NODE Left 5/10/2024    Procedure: EXCISION, LYMPH NODE;  Surgeon: Mike Nuñez MD;  Location: [facility] OR;  Service: Cardiothoracic;  Laterality: Left;    TONSILLECTOMY, ADENOIDECTOMY  1980s    TRANSESOPHAGEAL ECHOCARDIOGRAM WITH POSSIBLE CARDIOVERSION (LAKESHIA W/ POSS CARDIOVERSION) N/A 5/15/2024    Procedure: Transesophageal echo (LAKESHIA) intra-procedure log documentation;  Surgeon: Twan Pelaez MD;  Location: [facility] CATH LAB;  Service: Cardiology;  Laterality: N/A;    TRIGGER FINGER RELEASE Right 04/01/2015    Dr. Pedraza    XI ROBOTIC RATS,WITH LOBECTOMY,LUNG Left 5/10/2024    Procedure: XI ROBOTIC RATS,WITH LOBECTOMY,LUNG;  Surgeon: Mike Nuñez MD;  Location: [facility] OR;  Service: Cardiothoracic;  Laterality: Left;  Lingulectomy         Family History:  Family History   Problem  Relation Name Age of Onset    Prostate cancer Brother      Diabetes Maternal Aunt Alondra Campos     Diabetes Cousin      Hypertension Maternal Grandmother Portia Orosco        Social History:  Social History     Tobacco Use    Smoking status: Former     Current packs/day: 0.00     Average packs/day: 0.5 packs/day for 42.0 years (21.0 ttl pk-yrs)     Types: Cigarettes     Start date: 1970     Quit date: 2012     Years since quittin.2     Passive exposure: Past    Smokeless tobacco: Never   Substance and Sexual Activity    Alcohol use: Not Currently     Alcohol/week: 1.0 standard drink of alcohol     Types: 1 Glasses of wine per week     Comment: Glass red wine once a every 2 weeks    Drug use: Never    Sexual activity: Not Currently     Partners: Female     Birth control/protection: Abstinence, None        Review of Systems     Review of Systems   Respiratory:  Positive for cough and shortness of breath.    Cardiovascular:  Positive for chest pain.        (+) low BP     Gastrointestinal:  Negative for abdominal pain, diarrhea, nausea and vomiting.   Neurological:  Positive for light-headedness. Negative for dizziness.        Physical Exam     Initial Vitals [25 1202]   BP Pulse Resp Temp SpO2   (!) 73/50 (!) 116 20 99.5 °F (37.5 °C) 96 %      MAP       --          Physical Exam  Nursing Notes and Vital Signs Reviewed.  Constitutional: Patient is in no acute distress. Well-developed and well-nourished.  Head: Atraumatic. Normocephalic.  Eyes: PERRL. EOM intact. Conjunctivae are not pale. No scleral icterus.  ENT: Mucous membranes are moist.    Neck: Supple. Full ROM.   Cardiovascular: Tachycardic. Regular rhythm. No murmurs, rubs, or gallops.   Pulmonary/Chest: No respiratory distress. Clear to auscultation bilaterally. No wheezing or rales.  Abdominal: Soft and non-distended.  There is no tenderness.  No rebound, guarding, or rigidity.   Musculoskeletal: Moves all extremities. No obvious deformities.  No edema.  Skin: Warm and dry.  Neurological:  Alert, awake, and appropriate.  Normal speech.  No acute focal neurological deficits are appreciated.  Psychiatric: Normal affect. Good eye contact. Appropriate in content.       ED Course   Critical Care    Date/Time: 4/1/2025 3:18 PM    Performed by: Tiny Tai DO  Authorized by: Tiny Tai DO  Direct patient critical care time: 15 minutes  Additional history critical care time: 8 minutes  Ordering / reviewing critical care time: 5 minutes  Documentation critical care time: 6 minutes  Consulting other physicians critical care time: 6 minutes  Total critical care time (exclusive of procedural time) : 40 minutes  Critical care time was exclusive of separately billable procedures and treating other patients and teaching time.  Critical care was necessary to treat or prevent imminent or life-threatening deterioration of the following conditions: sepsis.  Critical care was time spent personally by me on the following activities: development of treatment plan with patient or surrogate, evaluation of patient's response to treatment, obtaining history from patient or surrogate, examination of patient, ordering and performing treatments and interventions, ordering and review of laboratory studies, ordering and review of radiographic studies, pulse oximetry and re-evaluation of patient's condition.      Critical Care    Date/Time: 4/1/2025 4:40 PM    Performed by: Rob Grubbs Jr., MD  Authorized by: Rob Grubbs Jr., MD  Direct patient critical care time: 12 minutes  Additional history critical care time: 8 minutes  Ordering / reviewing critical care time: 8 minutes  Documentation critical care time: 9 minutes  Consulting other physicians critical care time: 7 minutes  Consult with family critical care time: 5 minutes  Total critical care time (exclusive of procedural time) : 49 minutes  Critical care time was exclusive of separately billable procedures  and treating other patients and teaching time.  Critical care was necessary to treat or prevent imminent or life-threatening deterioration of the following conditions: sepsis and circulatory failure.  Critical care was time spent personally by me on the following activities: development of treatment plan with patient or surrogate, discussions with consultants, interpretation of cardiac output measurements, evaluation of patient's response to treatment, examination of patient, obtaining history from patient or surrogate, ordering and review of laboratory studies, ordering and performing treatments and interventions, ordering and review of radiographic studies, pulse oximetry, review of old charts and re-evaluation of patient's condition.  Subsequent provider of critical care: I assumed direction of critical care for this patient from another provider of my specialty.        ED Vital Signs:  Vitals:    04/01/25 1202 04/01/25 1315 04/01/25 1340 04/01/25 1400   BP: (!) 73/50 (!) 98/54  (!) 116/56   Pulse: (!) 116 106  103   Resp: 20 20  20   Temp: 99.5 °F (37.5 °C)      TempSrc: Oral      SpO2: 96% 95%  95%   Weight:   70.5 kg (155 lb 6.8 oz)     04/01/25 1850   BP: 123/69   Pulse: 102   Resp: 20   Temp:    TempSrc:    SpO2: (!) 91%   Weight:        Abnormal Lab Results:  Labs Reviewed   COMPREHENSIVE METABOLIC PANEL - Abnormal       Result Value    Sodium 133 (*)     Potassium 4.2      Chloride 100      CO2 21 (*)     Glucose 154 (*)     BUN 13      Creatinine 1.0      Calcium 9.1      Protein Total 7.9      Albumin 3.1 (*)     Bilirubin Total 0.9             (*)      (*)     Anion Gap 12      eGFR 59 (*)    LACTIC ACID, PLASMA - Abnormal    Lactic Acid Level 5.1 (*)     Narrative:     Falsely low lactic acid results can be found in samples containing >=13.0 mg/dL total bilirubin and/or >=3.5 mg/dL direct bilirubin.    URINALYSIS, REFLEX TO URINE CULTURE - Abnormal    Color, UA Yellow       Appearance, UA Clear      pH, UA 6.0      Spec Grav UA 1.015      Protein, UA Trace (*)     Glucose, UA Negative      Ketones, UA Negative      Bilirubin, UA Negative      Blood, UA Trace (*)     Nitrites, UA Negative      Urobilinogen, UA 2.0-3.0 (*)     Leukocyte Esterase, UA Negative     TROPONIN I - Abnormal    Troponin-I 0.276 (*)    PROCALCITONIN - Abnormal    Procalcitonin 6.41 (*)    CBC WITH DIFFERENTIAL - Abnormal    WBC 2.10 (*)     RBC 4.69      HGB 12.3      HCT 36.6 (*)     MCV 78 (*)     MCH 26.2 (*)     MCHC 33.6      RDW 19.5 (*)     Platelet Count 111 (*)     MPV 9.0 (*)     Nucleated RBC 0     MANUAL DIFFERENTIAL - Abnormal    Gran # (ANC) 1.2      Segmented Neutrophil % 46.0      Bands % 10.0      Lymphocyte % 25.0      Monocyte % 7.0      Metamyelocyte % 12.0      Platelet Estimate Decreased (*)     Anisocytosis Slight      Hypochromia Occasional      Target Cell Occasional      Stomatocytes Present      Large/Giant Platelets Present      Narrative:     Path Review reflexed: Metamyelocyte > 10   LACTIC ACID, PLASMA - Abnormal    Lactic Acid Level 2.3 (*)     Narrative:     Falsely low lactic acid results can be found in samples containing >=13.0 mg/dL total bilirubin and/or >=3.5 mg/dL direct bilirubin.    INFLUENZA A & B BY MOLECULAR - Normal    INFLUENZA A MOLECULAR Negative      INFLUENZA B MOLECULAR  Negative     SARS-COV-2 RNA AMPLIFICATION, QUAL - Normal    SARS COV-2 Molecular Negative     MAGNESIUM - Normal    Magnesium  1.6     CULTURE, BLOOD   CULTURE, BLOOD   CBC W/ AUTO DIFFERENTIAL    Narrative:     The following orders were created for panel order CBC auto differential.  Procedure                               Abnormality         Status                     ---------                               -----------         ------                     CBC with Differential[[id number]]       Abnormal            Final result               Manual Differential[[id number]]         Abnormal             Final result                 Please view results for these tests on the individual orders.   PATHOLOGIST REVIEW        All Lab Results:  Results for orders placed or performed during the hospital encounter of 04/01/25   EKG 12-lead    Collection Time: 04/01/25 12:25 PM   Result Value Ref Range    QRS Duration 74 ms    [facility] QTC Calculation 462 ms   Influenza A & B by Molecular    Collection Time: 04/01/25 12:29 PM    Specimen: Nasopharyngeal Swab   Result Value Ref Range    INFLUENZA A MOLECULAR Negative Negative    INFLUENZA B MOLECULAR  Negative Negative   COVID-19 Rapid Screening    Collection Time: 04/01/25 12:29 PM   Result Value Ref Range    SARS COV-2 Molecular Negative Negative   Comprehensive metabolic panel    Collection Time: 04/01/25 12:49 PM   Result Value Ref Range    Sodium 133 (L) 136 - 145 mmol/L    Potassium 4.2 3.5 - 5.1 mmol/L    Chloride 100 95 - 110 mmol/L    CO2 21 (L) 23 - 29 mmol/L    Glucose 154 (H) 70 - 110 mg/dL    BUN 13 8 - 23 mg/dL    Creatinine 1.0 0.5 - 1.4 mg/dL    Calcium 9.1 8.7 - 10.5 mg/dL    Protein Total 7.9 6.0 - 8.4 gm/dL    Albumin 3.1 (L) 3.5 - 5.2 g/dL    Bilirubin Total 0.9 0.1 - 1.0 mg/dL     40 - 150 unit/L     (H) 11 - 45 unit/L     (H) 10 - 44 unit/L    Anion Gap 12 8 - 16 mmol/L    eGFR 59 (L) >60 mL/min/1.73/m2   Lactic acid, plasma #1    Collection Time: 04/01/25 12:49 PM   Result Value Ref Range    Lactic Acid Level 5.1 (HH) 0.5 - 2.2 mmol/L   Magnesium    Collection Time: 04/01/25 12:49 PM   Result Value Ref Range    Magnesium  1.6 1.6 - 2.6 mg/dL   Troponin I    Collection Time: 04/01/25 12:49 PM   Result Value Ref Range    Troponin-I 0.276 (H) <=0.026 ng/mL   Procalcitonin    Collection Time: 04/01/25 12:49 PM   Result Value Ref Range    Procalcitonin 6.41 (H) <0.25 ng/mL   CBC with Differential    Collection Time: 04/01/25 12:49 PM   Result Value Ref Range    WBC 2.10 (L) 3.90 - 12.70 K/uL    RBC 4.69 4.00 - 5.40 M/uL    HGB 12.3 12.0 -  16.0 gm/dL    HCT 36.6 (L) 37.0 - 48.5 %    MCV 78 (L) 82 - 98 fL    MCH 26.2 (L) 27.0 - 31.0 pg    MCHC 33.6 32.0 - 36.0 g/dL    RDW 19.5 (H) 11.5 - 14.5 %    Platelet Count 111 (L) 150 - 450 K/uL    MPV 9.0 (L) 9.2 - 12.9 fL    Nucleated RBC 0 <=0 /100 WBC   Manual Differential    Collection Time: 04/01/25 12:49 PM   Result Value Ref Range    Gran # (ANC) 1.2 K/uL    Segmented Neutrophil % 46.0 38.0 - 73.0 %    Bands % 10.0 %    Lymphocyte % 25.0 18.0 - 48.0 %    Monocyte % 7.0 4.0 - 15.0 %    Metamyelocyte % 12.0 %    Platelet Estimate Decreased (A)      Anisocytosis Slight     Hypochromia Occasional     Target Cell Occasional     Stomatocytes Present     Large/Giant Platelets Present    Urinalysis, Reflex to Urine Culture    Collection Time: 04/01/25  3:39 PM    Specimen: Urine   Result Value Ref Range    Color, UA Yellow Straw, Yoon, Yellow, Light-Orange    Appearance, UA Clear Clear    pH, UA 6.0 5.0 - 8.0    Spec Grav UA 1.015 1.005 - 1.030    Protein, UA Trace (A) Negative    Glucose, UA Negative Negative    Ketones, UA Negative Negative    Bilirubin, UA Negative Negative    Blood, UA Trace (A) Negative    Nitrites, UA Negative Negative    Urobilinogen, UA 2.0-3.0 (A) <2.0 EU/dL    Leukocyte Esterase, UA Negative Negative   Lactic acid, plasma #2    Collection Time: 04/01/25  6:04 PM   Result Value Ref Range    Lactic Acid Level 2.3 (H) 0.5 - 2.2 mmol/L     *Note: Due to a large number of results and/or encounters for the requested time period, some results have not been displayed. A complete set of results can be found in Results Review.       Imaging Results:  Imaging Results              CT Chest Abdomen Pelvis With IV Contrast (XPD) Routine Oral Contrast (Final result)  Result time 04/01/25 18:16:29      Final result by Raymond Boggs DO (04/01/25 18:16:29)                   Impression:      1.  Multifocal groundglass opacities throughout the left lung most pronounced in the lower lobe, infectious or  inflammatory.  2.  Otherwise, no additional acute findings.    All CT scans at [this location] are performed using dose modulation techniques as appropriate to a performed exam including the following:  Automated exposure control; adjustment of the mA and/or kV according to patient size (this includes techniques or standardized protocols for targeted exams where dose is matched to indication / reason for exam; i.e. extremities or head); use of iterative reconstruction technique.    Finalized on: 4/1/2025 6:16 PM By:  Raymond Boggs  [facility]# [id number]      2025-04-01 18:18:33.030     [facility]               Narrative:    EXAM: CT CHEST ABDOMEN PELVIS WITH IV CONTRAST (XPD)    CLINICAL HISTORY: Sepsis    COMPARISON: 12/11/2024    TECHNIQUE: Standard thin-section axial images, with reformatted sagittal and coronal images.    FINDINGS: Chest: There is no mediastinal, hilar or axillary lymphadenopathy.  No pericardial effusion.  Moderate severe coronary artery calcification.    Multifocal groundglass opacities throughout the left lung most pronounced in the lower lobe.  Additional linear bands of atelectasis in the left upper lobe posteriorly extending to the major fissure.    Minimal atelectasis at the right lung base.  No pleural effusions.  No pulmonary nodules.    ABDOMEN: Cholecystectomy.  Liver, spleen, pancreas, adrenals, kidneys and ureters are unremarkable.    Vascular structures are unremarkable with the exception of atherosclerotic calcification.    No abdominal or retroperitoneal lymphadenopathy.  No ascites or fat stranding within the abdomen.  No dilatation of the large or small bowel.  No evidence for appendicitis.  Postsurgical changes at the stomach.    Pelvis: Small amount of air within the bladder.  No pelvic free fluid, iliac chain or inguinal lymphadenopathy.  Atrophic uterus and adnexal regions are unremarkable.    Osseous structures: Small fat-containing anterior abdominal wall defect just below the  umbilicus without evidence for incarceration.  No concerning lytic or sclerotic bony lesions.                                         X-Ray Chest AP Portable (Final result)  Result time 04/01/25 12:44:59      Final result by Juliette Prakash MD (Timothy) (04/01/25 12:44:59)                   Impression:      No acute infiltrates.      Electronically signed by: Juliette Prakash MD  Date:    04/01/2025  Time:    12:44               Narrative:    EXAMINATION:  XR CHEST AP PORTABLE    CLINICAL HISTORY:  , Sepsis;    COMPARISON:  Chest, 02/27/2025    FINDINGS:  One view.  Stable heart size.  Discoid atelectasis mid left lung.  No definite infiltrates.                                       The EKG was ordered, reviewed, and independently interpreted by the ED provider.  Interpretation time: 12:25  Rate: 110 BPM  Rhythm: sinus tachycardia  Interpretation: No acute ST changes. No STEMI.           The Emergency Provider reviewed the vital signs and test results, which are outlined above.     ED Discussion       3:10 PM: A focused exam (Vital Signs Reviewed, Cardiopulmonary Exam, Capillary Refill Evaluation, Peripheral Pulse Evaluation and Skin Examination) performed.    4:00 PM: Dr. Tai transfers care of patient to Dr. Grubbs pending lab and imaging results.  6:40 PM: Discussed case with Elise Lorenzo NP (Hospital Medicine). Dr. Osei agrees with current care and management of pt and accepts admission.   Admitting Service: Hospital Medicine  Admitting Physician: Dr. Osei  Admit to: Inpatient. Med/tele.    6:40 PM: Re-evaluated pt. I have discussed test results, shared treatment plan, and the need for admission with patient/family/caretaker at bedside. Pt and/or family/caretaker express understanding at this time and agree with all information. All questions answered. Pt/caretaker/family member(s) have no further questions or concerns at this time. Pt is ready for admit.        ED Course as of 04/01/25 1903 Tue Apr  01, 2025   1214 final dose of concurrent carboplatin Taxol external beam radiation therapy on 3/28/25 [NF]   1630 4:32 PM:  Sepsis focused re-evaluation   30mL/kg IVF have been administered within 3 hours of identification of SIRS criteria. Vital signs were reviewed and a focal sepsis perfusion assessment was performed.  Cap refill less than 2 seconds with a normal peripheral pulses.  Skin is warm to the touch and non mottled.  Cardiopulmonary exam unchanged.  Vital signs reviewed     [RT]      ED Course User Index  [NF] Tiny Tai, DO  [RT] Rob Grubbs Jr., MD     Medical Decision Making  Differential diagnosis: Sepsis, severe sepsis, septic shock, pneumonia, UTI, neutropenic fever    Patient has a cancer patient receiving chemo and radiation.  She is neutropenic with a low-grade temp here and hypotension.  She had met SIRS criteria was dosed with CT sepsis fluids and antibiotics.  Source appears to be the left lower lobe on a pneumonia found on CT imaging.  The patient is stable.  He has been admitted to Hospital Medicine further evaluation and treatment    Amount and/or Complexity of Data Reviewed  External Data Reviewed: labs and notes.  Labs: ordered. Decision-making details documented in ED Course.     Details: UA is negative lactate initially is 5.4 however it came down to 2.3.  She has a 2.1 white count showing neutropenia.  Hemoglobin is 12.3.  With a count is 111.  Procalcitonin elevated at 6.41 with the elevated troponin 0.276 Mag of 1.6.  CMP shows mild elevation in AST and ALT that has otherwise benign.  Flu and COVID are negative  Radiology: ordered. Decision-making details documented in ED Course.     Details: Chest x-ray is clear.  CT chest abdomen and pelvis shows leftward lobe multifocal infiltrate  ECG/medicine tests: ordered and independent interpretation performed. Decision-making details documented in ED Course.     Details: No STEMI  Discussion of management or test interpretation  with external provider(s): Discussed the case with hospital medicine graciously accepts for admission    Risk  OTC drugs.  Prescription drug management.  Parenteral controlled substances.  Decision regarding hospitalization.  Diagnosis or treatment significantly limited by social determinants of health.  Risk Details: Total critical care time is divided between 2 physicians.    Social determinants: Neutropenic fever secondary to chemo and radiation for underlying cancer    Critical Care  Total time providing critical care: 89 minutes                ED Medication(s):  Medications   sodium chloride 0.9% bolus 1,182 mL 1,182 mL (0 mLs Intravenous Stopped 4/1/25 1413)   ceFEPIme injection 2 g (2 g Intravenous Given 4/1/25 1637)   iohexoL (OMNIPAQUE 350) injection 100 mL (100 mLs Intravenous Given 4/1/25 1739)       New Prescriptions    No medications on file               Scribe Attestation:   Scribe #1: I performed the above scribed service and the documentation accurately describes the services I performed. I attest to the accuracy of the note.     Attending:   Physician Attestation Statement for Scribe #1: I, Tiny Tai DO, personally performed the services described in this documentation, as scribed by Tato Mobley, in my presence, and it is both accurate and complete.       Scribe Attestation:   Scribe #2: I performed the above scribed service and the documentation accurately describes the services I performed. I attest to the accuracy of the note.    Attending Attestation:           Physician Attestation for Scribe:    Physician Attestation Statement for Scribe #2: I, Rob Grubbs Jr., MD, reviewed documentation, as scribed by Estelle Pavon in my presence, and it is both accurate and complete. I also acknowledge and confirm the content of the note done by Scribe #1.           Clinical Impression       ICD-10-CM ICD-9-CM   1. Severe sepsis  A41.9 038.9    R65.20 995.92   2. Screening for cardiovascular  condition  Z13.6 V81.2   3. Neutropenic fever  D70.9 288.00    R50.81 780.61   4. Troponin level elevated  R79.89 790.6   5. Lactic acidosis  E87.20 276.2       Disposition:   Disposition: Admitted  Condition: Rob Concepcion Jr., MD  04/01/25 1901

## 2025-04-01 NOTE — PROGRESS NOTES
Patient ID: Cassandra Campos is a 74 y.o. female.    Chief Complaint: Follow-up    History of Present Illness    Ms. Campos presents today with chest congestion and cough.    She reports productive cough with chest congestion. She denies shortness of breath.    She is currently undergoing radiation therapy and recently completed chemotherapy.    She reports eating well with some mild bowel problems.    She is compliant with medications which are being managed by her pharmacist.      ROS:  General: -fever, -chills, -fatigue, -weight gain, -weight loss  Eyes: -vision changes, -redness, -discharge  ENT: -ear pain, -nasal congestion, -sore throat  Cardiovascular: -chest pain, -palpitations, -lower extremity edema  Respiratory: +cough, -shortness of breath, +chest congestion, +productive cough  Gastrointestinal: -abdominal pain, -nausea, -vomiting, -diarrhea, -constipation, -blood in stool, +change in bowel habits  Genitourinary: -dysuria, -hematuria, -frequency  Musculoskeletal: -joint pain, -muscle pain  Skin: -rash, -lesion  Neurological: -headache, -dizziness, -numbness, -tingling         Physical Exam    General: In no acute distress. Not ill-appearing or diaphoretic.  Neck: Normal thyroid. No cervical lymphadenopathy. 2+ carotid pulses.  Cardiovascular: Normal rate and regular  rhythm. No murmur heard. No gallop.  Pulmonary: Pulmonary effort is normal. No respiratory distress. No wheezing. No rhonchi. No rales.  Abdominal: Soft. Nontender. Nondistended. No mass.  Musculoskeletal: No swelling. No tenderness. No deformity.  Neurological: Alert.  Psychiatric: Mood normal. Behavior normal.  Vitals: Weight: 144 lbs.         Assessment & Plan    ACUTE COUGH AND CHEST CONGESTION:  - Ms. Campos reports coughing frequently and experiencing chest congestion.  - Auscultated the patient's chest and found it to be clear.  - Explained that the cough is likely related to the patient's lung cancer  - Reviewed recent chest XR  results to evaluate current condition.  - Informed the patient that the radiation therapy is aimed at addressing the chest congestion and is expected to help clear up the cough.  - Observed that the patient was able to produce sputum when requested.    INTESTINAL DISORDER:  - Ms. Campos reports experiencing no bowel problems.  - Inquired about the nature and severity of the bowel problems.  - Planned to refill medications as needed to manage the intestinal disorder.        Recent lab was reviewed medications reviewed health maintenance reviewed      Follow up in about 4 months (around 8/1/2025).    This note was generated with the assistance of ambient listening technology. Verbal consent was obtained by the patient and accompanying visitor(s) for the recording of patient appointment to facilitate this note. I attest to having reviewed and edited the generated note for accuracy, though some syntax or spelling errors may persist. Please contact the author of this note for any clarification.

## 2025-04-01 NOTE — PLAN OF CARE
Pt unable to receive xrt today due to hypotension & lethargy. She was escorted to the ER with her sister. Will continue to monitor.

## 2025-04-02 PROBLEM — J18.9 MULTIFOCAL PNEUMONIA: Status: ACTIVE | Noted: 2025-04-02

## 2025-04-02 PROBLEM — I95.9 HYPOTENSION: Status: ACTIVE | Noted: 2025-04-02

## 2025-04-02 PROBLEM — A41.9 SEPSIS: Status: ACTIVE | Noted: 2025-04-02

## 2025-04-02 LAB
ABSOLUTE EOSINOPHIL (OHS): 0 K/UL
ABSOLUTE MONOCYTE (OHS): 0.09 K/UL (ref 0.3–1)
ABSOLUTE NEUTROPHIL COUNT (OHS): 2.21 K/UL (ref 1.8–7.7)
ALBUMIN SERPL BCP-MCNC: 2.7 G/DL (ref 3.5–5.2)
ALP SERPL-CCNC: 101 UNIT/L (ref 40–150)
ALT SERPL W/O P-5'-P-CCNC: 103 UNIT/L (ref 10–44)
ANION GAP (OHS): 8 MMOL/L (ref 8–16)
AST SERPL-CCNC: 59 UNIT/L (ref 11–45)
BASOPHILS # BLD AUTO: 0.02 K/UL
BASOPHILS NFR BLD AUTO: 0.7 %
BILIRUB SERPL-MCNC: 0.7 MG/DL (ref 0.1–1)
BUN SERPL-MCNC: 7 MG/DL (ref 8–23)
CALCIUM SERPL-MCNC: 9 MG/DL (ref 8.7–10.5)
CHLORIDE SERPL-SCNC: 105 MMOL/L (ref 95–110)
CO2 SERPL-SCNC: 21 MMOL/L (ref 23–29)
CREAT SERPL-MCNC: 0.5 MG/DL (ref 0.5–1.4)
ERYTHROCYTE [DISTWIDTH] IN BLOOD BY AUTOMATED COUNT: 19.2 % (ref 11.5–14.5)
GFR SERPLBLD CREATININE-BSD FMLA CKD-EPI: >60 ML/MIN/1.73/M2
GLUCOSE SERPL-MCNC: 106 MG/DL (ref 70–110)
HCT VFR BLD AUTO: 34.9 % (ref 37–48.5)
HGB BLD-MCNC: 11.9 GM/DL (ref 12–16)
IMM GRANULOCYTES # BLD AUTO: 0.03 K/UL (ref 0–0.04)
IMM GRANULOCYTES NFR BLD AUTO: 1.1 % (ref 0–0.5)
LYMPHOCYTES # BLD AUTO: 0.35 K/UL (ref 1–4.8)
MCH RBC QN AUTO: 26.1 PG (ref 27–31)
MCHC RBC AUTO-ENTMCNC: 34.1 G/DL (ref 32–36)
MCV RBC AUTO: 77 FL (ref 82–98)
NUCLEATED RBC (/100WBC) (OHS): 0 /100 WBC
PATHOLOGIST REVIEW - CBC/DIFF (OHS): NORMAL
PLATELET # BLD AUTO: 83 K/UL (ref 150–450)
PLATELET BLD QL SMEAR: ABNORMAL
PMV BLD AUTO: 8.7 FL (ref 9.2–12.9)
POCT GLUCOSE: 104 MG/DL (ref 70–110)
POCT GLUCOSE: 153 MG/DL (ref 70–110)
POCT GLUCOSE: 168 MG/DL (ref 70–110)
POTASSIUM SERPL-SCNC: 3.7 MMOL/L (ref 3.5–5.1)
PROT SERPL-MCNC: 7.2 GM/DL (ref 6–8.4)
RBC # BLD AUTO: 4.56 M/UL (ref 4–5.4)
RELATIVE EOSINOPHIL (OHS): 0 %
RELATIVE LYMPHOCYTE (OHS): 13 % (ref 18–48)
RELATIVE MONOCYTE (OHS): 3.3 % (ref 4–15)
RELATIVE NEUTROPHIL (OHS): 81.9 % (ref 38–73)
SODIUM SERPL-SCNC: 134 MMOL/L (ref 136–145)
TROPONIN I SERPL DL<=0.01 NG/ML-MCNC: 0.04 NG/ML
WBC # BLD AUTO: 2.7 K/UL (ref 3.9–12.7)

## 2025-04-02 PROCEDURE — 99223 1ST HOSP IP/OBS HIGH 75: CPT | Mod: ,,, | Performed by: INTERNAL MEDICINE

## 2025-04-02 PROCEDURE — 80053 COMPREHEN METABOLIC PANEL: CPT | Performed by: NURSE PRACTITIONER

## 2025-04-02 PROCEDURE — 25000003 PHARM REV CODE 250: Performed by: NURSE PRACTITIONER

## 2025-04-02 PROCEDURE — 63600175 PHARM REV CODE 636 W HCPCS: Performed by: NURSE PRACTITIONER

## 2025-04-02 PROCEDURE — 36415 COLL VENOUS BLD VENIPUNCTURE: CPT | Performed by: INTERNAL MEDICINE

## 2025-04-02 PROCEDURE — 84484 ASSAY OF TROPONIN QUANT: CPT | Performed by: INTERNAL MEDICINE

## 2025-04-02 PROCEDURE — 63600175 PHARM REV CODE 636 W HCPCS: Performed by: INTERNAL MEDICINE

## 2025-04-02 PROCEDURE — 21400001 HC TELEMETRY ROOM

## 2025-04-02 PROCEDURE — 85025 COMPLETE CBC W/AUTO DIFF WBC: CPT | Performed by: NURSE PRACTITIONER

## 2025-04-02 PROCEDURE — 27000207 HC ISOLATION

## 2025-04-02 RX ORDER — AMITRIPTYLINE HYDROCHLORIDE 50 MG/1
50 TABLET, FILM COATED ORAL NIGHTLY
Status: DISCONTINUED | OUTPATIENT
Start: 2025-04-02 | End: 2025-04-03 | Stop reason: HOSPADM

## 2025-04-02 RX ORDER — LORAZEPAM 1 MG/1
1 TABLET ORAL 2 TIMES DAILY
Status: DISCONTINUED | OUTPATIENT
Start: 2025-04-02 | End: 2025-04-03 | Stop reason: HOSPADM

## 2025-04-02 RX ORDER — CEFEPIME HYDROCHLORIDE 2 G/1
2 INJECTION, POWDER, FOR SOLUTION INTRAVENOUS
Status: DISCONTINUED | OUTPATIENT
Start: 2025-04-02 | End: 2025-04-03 | Stop reason: HOSPADM

## 2025-04-02 RX ORDER — ZOLPIDEM TARTRATE 5 MG/1
5 TABLET ORAL NIGHTLY PRN
Status: DISCONTINUED | OUTPATIENT
Start: 2025-04-02 | End: 2025-04-03 | Stop reason: HOSPADM

## 2025-04-02 RX ORDER — METOPROLOL SUCCINATE 50 MG/1
50 TABLET, EXTENDED RELEASE ORAL DAILY
Status: DISCONTINUED | OUTPATIENT
Start: 2025-04-02 | End: 2025-04-03 | Stop reason: HOSPADM

## 2025-04-02 RX ORDER — OXYCODONE AND ACETAMINOPHEN 10; 325 MG/1; MG/1
1 TABLET ORAL EVERY 4 HOURS PRN
Refills: 0 | Status: DISCONTINUED | OUTPATIENT
Start: 2025-04-02 | End: 2025-04-03 | Stop reason: HOSPADM

## 2025-04-02 RX ADMIN — DOXYCYCLINE 100 MG: 100 INJECTION, POWDER, LYOPHILIZED, FOR SOLUTION INTRAVENOUS at 10:04

## 2025-04-02 RX ADMIN — LORAZEPAM 1 MG: 1 TABLET ORAL at 09:04

## 2025-04-02 RX ADMIN — DOXYCYCLINE 100 MG: 100 INJECTION, POWDER, LYOPHILIZED, FOR SOLUTION INTRAVENOUS at 09:04

## 2025-04-02 RX ADMIN — METOPROLOL SUCCINATE 50 MG: 50 TABLET, EXTENDED RELEASE ORAL at 09:04

## 2025-04-02 RX ADMIN — CEFEPIME 2 G: 2 INJECTION, POWDER, FOR SOLUTION INTRAVENOUS at 05:04

## 2025-04-02 RX ADMIN — CEFEPIME 2 G: 2 INJECTION, POWDER, FOR SOLUTION INTRAVENOUS at 01:04

## 2025-04-02 RX ADMIN — ENOXAPARIN SODIUM 40 MG: 40 INJECTION SUBCUTANEOUS at 05:04

## 2025-04-02 RX ADMIN — CEFEPIME 2 G: 2 INJECTION, POWDER, FOR SOLUTION INTRAVENOUS at 09:04

## 2025-04-02 RX ADMIN — AMITRIPTYLINE HYDROCHLORIDE 50 MG: 50 TABLET ORAL at 09:04

## 2025-04-02 NOTE — H&P
[facility] - Emergency Dept.  [facility]  History & Physical    Patient Name: Cassandra Campos  MRN: [id number]  Patient Class: IP- Inpatient  Admission Date: 4/1/2025  Attending Physician: Paresh Osei MD  Primary Care Provider: Emil Zhang MD         Patient information was obtained from patient, past medical records, and ER records.     Subjective:     Principal Problem:Sepsis    Chief Complaint:   Chief Complaint   Patient presents with    Hypotension     Pt. Reports that her BP was low at the cancer center so she was sent over here.         HPI: Cassandra Campos is a 74 y.o. female with a PMH  has a past medical history of Arthritis, Bilateral bunions, Borderline glaucoma, De Quervain's disease (radial styloid tenosynovitis), Gastritis, Hydradenitis, Hyperlipidemia, Hypertension, Insomnia, Lung cancer, Migraines (02/01/2000), Nasal septum perforation, Obesity, Pneumonia, Restrictive airway disease, Sleep apnea, SVT (supraventricular tachycardia) (09/2013), Trigger finger, and Type 2 diabetes mellitus (2012).  Presented to the ER for evaluation for low blood pressure.  Patient was sent over from Dr. Zhang's office where she was supposed to receive her radiation therapy at the [facility], but her session was held due to her low BP reading in the office.  Patient reports she felt more fatigued with shortness of breath mild chest pain, productive cough, and lightheadedness/dizziness which started yesterday.  Denied any specific alleviating or aggravating factors.  Denies any fevers, aches, chills, sweats, nausea, vomiting, diarrhea, abdominal pain, or any other symptoms at this time.    ER workup revealed CBC to be at baseline.  CMP revealed CBG of 154 mg/dL AST/ALT of 177/163.  Troponin 0.276.  Procalcitonin level 6.41.  Lactic acid level 5.1 with repeat 2.3.  Flu/COVID negative.  UA unremarkable.  Blood cultures pending.  Chest x-ray showed no acute infiltrates.  CT chest abdomen and pelvis  with IV contrast revealed multifocal ground-glass opacities to left lung most prominent in the lower lobe infectious versus inflammatory process.  EKG revealed sinus tachycardia with a ventricular rate of 110 beats per minute QT/QTC of 342/462.  Patient received 2 g of cefepime and 1,182 cc normal saline in the ER.  Hospital Medicine consulted to admit patient for sepsis likely secondary to multifocal pneumonia.  Patient in agreement with treatment plan.  Patient admitted under inpatient status.    PCP: Emil Zhang    Past Medical History:   Diagnosis Date    Arthritis     hands    Bilateral bunions     Borderline glaucoma     De Quervain's disease (radial styloid tenosynovitis)     Gastritis     upper GI 2/2017    Hydradenitis     Hyperlipidemia     Hypertension     Insomnia     Lung cancer     Migraines 02/01/2000    Nasal septum perforation     Obesity     Pneumonia     Restrictive airway disease     Sleep apnea     SVT (supraventricular tachycardia) 09/2013    Trigger finger     Type 2 diabetes mellitus 2012     am 02/01/2024       Past Surgical History:   Procedure Laterality Date    AXILLARY HIDRADENITIS EXCISION Bilateral     BONE EXOSTOSIS EXCISION Right 07/25/2018    Procedure: EXCISION, EXOSTOSIS;  Surgeon: Jayro Pedraza Sr., MD;  Location: [facility] OR;  Service: Orthopedics;  Laterality: Right;    BREAST BIOPSY Bilateral     both benign    BREAST SURGERY  07/1998    BRONCHOSCOPY Bilateral 1/15/2025    Procedure: Bronchoscopy - insert lighted tube into airway to take a biopsy of lung;  Surgeon: Adrian Robin MD;  Location: [facility];  Service: Pulmonary;  Laterality: Bilateral;    CARPAL TUNNEL RELEASE      bilateral    CARPAL TUNNEL RELEASE Right 02/09/2024    Procedure: RELEASE, CARPAL TUNNEL;  Surgeon: Jaime Oswald MD;  Location: [facility] OR;  Service: Orthopedics;  Laterality: Right;    CARPAL TUNNEL RELEASE Left 03/08/2024    Procedure: RELEASE, CARPAL TUNNEL;  Surgeon: Margret  Jaime Rizvi MD;  [facility];  Service: Orthopedics;  Laterality: Left;    CATARACT EXTRACTION Bilateral     OU     SECTION  1979    CHOLECYSTECTOMY  2014    COLONOSCOPY N/A 10/02/2020    Procedure: COLONOSCOPY;  Surgeon: Tushar Edwards MD;  Location: [facility];  Service: Endoscopy;  Laterality: N/A;    COLONOSCOPY W/ POLYPECTOMY  10/02/2020    Polyps x3, repeat 5 years; Tushar Edwards MD     CYST REMOVAL  2015    sebaceous cyst removed from face    DE QUERVAIN'S RELEASE Left 2020    Procedure: RELEASE, HAND, FOR DEQUERVAIN'S TENOSYNOVITIS;  Surgeon: Jaime Oswald MD;  Location: [facility] OR;  Service: Orthopedics;  Laterality: Left;    DE QUERVAIN'S RELEASE Right 2020    Procedure: RELEASE, HAND, FOR DEQUERVAIN'S TENOSYNOVITIS;  Surgeon: Jaime Oswald MD;  [facility];  Service: Orthopedics;  Laterality: Right;    ENDOBRONCHIAL ULTRASOUND Bilateral 04/10/2024    Procedure: ENDOBRONCHIAL ULTRASOUND (EBUS);  Surgeon: Adrian Robin MD;  Location: [facility];  Service: Pulmonary;  Laterality: Bilateral;    ENDOBRONCHIAL ULTRASOUND Bilateral 1/15/2025    Procedure: ENDOBRONCHIAL ULTRASOUND (EBUS);  Surgeon: Adrian Robin MD;  Location: [facility];  Service: Pulmonary;  Laterality: Bilateral;    EYE SURGERY      gastric sleeve  2017    Dr. Watson    INJECTION OF ANESTHETIC AGENT AROUND MULTIPLE INTERCOSTAL NERVES  5/10/2024    Procedure: BLOCK, NERVE, INTERCOSTAL, 2 OR MORE;  Surgeon: Mike Nuñez MD;  [facility];  Service: Cardiothoracic;;    KNEE SURGERY Right     OLECRANON BURSECTOMY Right 2018    Procedure: BURSECTOMY, OLECRANON;  Surgeon: Jayro Pedraza Sr., MD;  Location[facility];  Service: Orthopedics;  Laterality: Right;    SURGICAL REMOVAL OF BUNION WITH OSTEOTOMY OF METATARSAL BONE Left 05/10/2019    Procedure: BUNIONECTOMY, WITH METATARSAL OSTEOTOMY;  Surgeon: Srinivasan Villanueva DPM;  [facility];  Service: Podiatry;   Laterality: Left;    SURGICAL REMOVAL OF BUNION WITH OSTEOTOMY OF METATARSAL BONE Right 06/28/2019    Procedure: BUNIONECTOMY, WITH METATARSAL OSTEOTOMY;  Surgeon: Srinivasan Villanueva DPM;  Location: [facility] OR;  Service: Podiatry;  Laterality: Right;    SURGICAL REMOVAL OF LYMPH NODE Left 5/10/2024    Procedure: EXCISION, LYMPH NODE;  Surgeon: Mike Nuñez MD;  Location: [facility] OR;  Service: Cardiothoracic;  Laterality: Left;    TONSILLECTOMY, ADENOIDECTOMY  1980s    TRANSESOPHAGEAL ECHOCARDIOGRAM WITH POSSIBLE CARDIOVERSION (LAKESHIA W/ POSS CARDIOVERSION) N/A 5/15/2024    Procedure: Transesophageal echo (LAKESHIA) intra-procedure log documentation;  Surgeon: Twan Pelaez MD;  Location: [facility] CATH LAB;  Service: Cardiology;  Laterality: N/A;    TRIGGER FINGER RELEASE Right 04/01/2015    Dr. Milo HALL ROBOTIC RATS,WITH LOBECTOMY,LUNG Left 5/10/2024    Procedure: XI ROBOTIC RATS,WITH LOBECTOMY,LUNG;  Surgeon: Mike Nuñez MD;  Location: [facility] OR;  Service: Cardiothoracic;  Laterality: Left;  Lingulectomy       Review of patient's allergies indicates:  No Known Allergies    Current Facility-Administered Medications on File Prior to Encounter   Medication    sodium chloride 0.9% flush 10 mL    sodium chloride 0.9% flush 10 mL     Current Outpatient Medications on File Prior to Encounter   Medication Sig    albuterol (PROVENTIL/VENTOLIN HFA) 90 mcg/actuation inhaler INHALE 2 PUFFS INTO THE LUNGS EVERY 4 HOURS AS NEEDED FOR WHEEZING OR SHORTNESS OF BREATH.    amitriptyline (ELAVIL) 50 MG tablet Take 1 tablet (50 mg total) by mouth every evening.    aspirin (ECOTRIN) 81 MG EC tablet Take 1 tablet (81 mg total) by mouth once daily.    blood sugar diagnostic Strp use to test blood sugar 1-2 times a day    blood-glucose meter (ACCU-CHEK GUIDE ME GLUCOSE MTR) Misc use to check blood glucose 2 times a day    blood-glucose meter (ACCU-CHEK GUIDE ME GLUCOSE MTR) Misc To check blood glucose 1-2 times daily, to use with insurance  preferred meter    diphenhydrAMINE-aluminum-magnesium hydroxide-simethicone-LIDOcaine viscous HCl 2% Swish and spit 15 mLs every 4 (four) hours as needed.    eszopiclone (LUNESTA) 3 mg Tab Take 1 tablet (3 mg total) by mouth every evening.    lancets Misc Use to test blood glucose 1-2 times a day, discard lancet after each use    LORazepam (ATIVAN) 1 MG tablet Take 1 tablet (1 mg total) by mouth 2 (two) times daily.    losartan (COZAAR) 25 MG tablet Take 1 tablet (25 mg total) by mouth once daily.    magic mouthwash diphen/antac/lidoc swish and spit 15ml every 4 hours as needed    metoprolol succinate (TOPROL-XL) 50 MG 24 hr tablet Take 1 tablet (50 mg total) by mouth once daily.    OLANZapine (ZYPREXA) 5 MG tablet Take 1 tablet (5 mg total) by mouth every evening. On days 1-3 of each chemotherapy cycle.    omeprazole (PRILOSEC) 20 MG capsule Take 1 capsule (20 mg total) by mouth once daily.    oxyCODONE-acetaminophen (PERCOCET)  mg per tablet Take 1 tablet by mouth every 4 (four) hours as needed for Pain.    prochlorperazine (COMPAZINE) 5 MG tablet Take 1 tablet (5 mg total) by mouth every 6 (six) hours as needed for Nausea.    semaglutide (OZEMPIC) 2 mg/dose (8 mg/3 mL) PnIj Inject 2 mg into the skin every 7 days.    traMADoL (ULTRAM) 50 mg tablet Take 1 tablet (50 mg total) by mouth 2 (two) times a day.    traMADoL (ULTRAM) 50 mg tablet Take 1 tablet (50 mg total) by mouth every 12 (twelve) hours as needed for Pain. Greater than 7 day supply medically necessary.    triamcinolone acetonide 0.025 % Lotn Apply small amount to affected areas twice a day.  Take cool showers or baths     Family History       Problem Relation (Age of Onset)    Diabetes Maternal Aunt, Cousin    Hypertension Maternal Grandmother    Prostate cancer Brother          Tobacco Use    Smoking status: Former     Current packs/day: 0.00     Average packs/day: 0.5 packs/day for 42.0 years (21.0 ttl pk-yrs)     Types: Cigarettes     Start  date: 1970     Quit date: 2012     Years since quittin.2     Passive exposure: Past    Smokeless tobacco: Never   Substance and Sexual Activity    Alcohol use: Not Currently     Alcohol/week: 1.0 standard drink of alcohol     Types: 1 Glasses of wine per week     Comment: Glass red wine once a every 2 weeks    Drug use: Never    Sexual activity: Not Currently     Partners: Female     Birth control/protection: Abstinence, None     Review of Systems   Constitutional:  Positive for fatigue. Negative for chills, diaphoresis and fever.   Respiratory:  Positive for cough and shortness of breath.    Cardiovascular:  Positive for chest pain. Negative for palpitations and leg swelling.   Gastrointestinal:  Negative for abdominal pain, diarrhea, nausea and vomiting.   Neurological:  Positive for dizziness, weakness (Generalized) and light-headedness.   All other systems reviewed and are negative.    Objective:     Vital Signs (Most Recent):  Temp: 99.5 °F (37.5 °C) (25 1202)  Pulse: 103 (25 0600)  Resp: 20 (25 0600)  BP: 123/66 (25 0600)  SpO2: (!) 93 % (25 06) Vital Signs (24h Range):  Temp:  [98.5 °F (36.9 °C)-99.5 °F (37.5 °C)] 99.5 °F (37.5 °C)  Pulse:  [] 103  Resp:  [16-20] 20  SpO2:  [91 %-99 %] 93 %  BP: ()/(50-75) 123/66     Weight: 70.5 kg (155 lb 6.8 oz)  Body mass index is 38.9 kg/m².     Physical Exam  Vitals and nursing note reviewed.   Constitutional:       General: She is awake. She is not in acute distress.     Appearance: Normal appearance. She is well-developed and well-groomed. She is ill-appearing. She is not toxic-appearing or diaphoretic.   HENT:      Head: Normocephalic and atraumatic.      Mouth/Throat:      Lips: Pink.      Mouth: Mucous membranes are moist.      Pharynx: Oropharynx is clear. Uvula midline.   Eyes:      Extraocular Movements: Extraocular movements intact.      Conjunctiva/sclera: Conjunctivae normal.      Pupils: Pupils are  equal, round, and reactive to light.   Cardiovascular:      Rate and Rhythm: Normal rate and regular rhythm.      Heart sounds: Normal heart sounds. No murmur heard.  Pulmonary:      Effort: Pulmonary effort is normal.      Breath sounds: Decreased breath sounds present. No wheezing, rhonchi or rales.   Abdominal:      General: Bowel sounds are normal.      Palpations: Abdomen is soft.      Tenderness: There is no abdominal tenderness. There is no right CVA tenderness, left CVA tenderness, guarding or rebound.   Musculoskeletal:      Cervical back: Normal range of motion and neck supple.      Right lower leg: No edema.      Left lower leg: No edema.      Comments: 5/5 strength throughout   Skin:     General: Skin is warm and dry.      Capillary Refill: Capillary refill takes less than 2 seconds.   Neurological:      General: No focal deficit present.      Mental Status: She is alert and oriented to person, place, and time. Mental status is at baseline.      GCS: GCS eye subscore is 4. GCS verbal subscore is 5. GCS motor subscore is 6.      Cranial Nerves: Cranial nerves 2-12 are intact.      Sensory: Sensation is intact.      Motor: Motor function is intact.   Psychiatric:         Mood and Affect: Mood normal.         Speech: Speech normal.         Behavior: Behavior normal. Behavior is cooperative.              CRANIAL NERVES     CN III, IV, VI   Pupils are equal, round, and reactive to light.     LABS:  Recent Results (from the past 24 hours)   EKG 12-lead    Collection Time: 04/01/25 12:25 PM   Result Value Ref Range    QRS Duration 74 ms    [facility] QTC Calculation 462 ms   COVID-19 Rapid Screening    Collection Time: 04/01/25 12:29 PM   Result Value Ref Range    SARS COV-2 Molecular Negative Negative   Influenza A & B by Molecular    Collection Time: 04/01/25 12:29 PM    Specimen: Nasopharyngeal Swab   Result Value Ref Range    INFLUENZA A MOLECULAR Negative Negative    INFLUENZA B MOLECULAR  Negative Negative    Comprehensive metabolic panel    Collection Time: 04/01/25 12:49 PM   Result Value Ref Range    Sodium 133 (L) 136 - 145 mmol/L    Potassium 4.2 3.5 - 5.1 mmol/L    Chloride 100 95 - 110 mmol/L    CO2 21 (L) 23 - 29 mmol/L    Glucose 154 (H) 70 - 110 mg/dL    BUN 13 8 - 23 mg/dL    Creatinine 1.0 0.5 - 1.4 mg/dL    Calcium 9.1 8.7 - 10.5 mg/dL    Protein Total 7.9 6.0 - 8.4 gm/dL    Albumin 3.1 (L) 3.5 - 5.2 g/dL    Bilirubin Total 0.9 0.1 - 1.0 mg/dL     40 - 150 unit/L     (H) 11 - 45 unit/L     (H) 10 - 44 unit/L    Anion Gap 12 8 - 16 mmol/L    eGFR 59 (L) >60 mL/min/1.73/m2   Lactic acid, plasma #1    Collection Time: 04/01/25 12:49 PM   Result Value Ref Range    Lactic Acid Level 5.1 (HH) 0.5 - 2.2 mmol/L   Magnesium    Collection Time: 04/01/25 12:49 PM   Result Value Ref Range    Magnesium  1.6 1.6 - 2.6 mg/dL   Troponin I    Collection Time: 04/01/25 12:49 PM   Result Value Ref Range    Troponin-I 0.276 (H) <=0.026 ng/mL   Procalcitonin    Collection Time: 04/01/25 12:49 PM   Result Value Ref Range    Procalcitonin 6.41 (H) <0.25 ng/mL   CBC with Differential    Collection Time: 04/01/25 12:49 PM   Result Value Ref Range    WBC 2.10 (L) 3.90 - 12.70 K/uL    RBC 4.69 4.00 - 5.40 M/uL    HGB 12.3 12.0 - 16.0 gm/dL    HCT 36.6 (L) 37.0 - 48.5 %    MCV 78 (L) 82 - 98 fL    MCH 26.2 (L) 27.0 - 31.0 pg    MCHC 33.6 32.0 - 36.0 g/dL    RDW 19.5 (H) 11.5 - 14.5 %    Platelet Count 111 (L) 150 - 450 K/uL    MPV 9.0 (L) 9.2 - 12.9 fL    Nucleated RBC 0 <=0 /100 WBC   Manual Differential    Collection Time: 04/01/25 12:49 PM   Result Value Ref Range    Gran # (ANC) 1.2 K/uL    Segmented Neutrophil % 46.0 38.0 - 73.0 %    Bands % 10.0 %    Lymphocyte % 25.0 18.0 - 48.0 %    Monocyte % 7.0 4.0 - 15.0 %    Metamyelocyte % 12.0 %    Platelet Estimate Decreased (A)      Anisocytosis Slight     Hypochromia Occasional     Target Cell Occasional     Stomatocytes Present     Large/Giant Platelets Present     Urinalysis, Reflex to Urine Culture    Collection Time: 04/01/25  3:39 PM    Specimen: Urine   Result Value Ref Range    Color, UA Yellow Straw, Yoon, Yellow, Light-Orange    Appearance, UA Clear Clear    pH, UA 6.0 5.0 - 8.0    Spec Grav UA 1.015 1.005 - 1.030    Protein, UA Trace (A) Negative    Glucose, UA Negative Negative    Ketones, UA Negative Negative    Bilirubin, UA Negative Negative    Blood, UA Trace (A) Negative    Nitrites, UA Negative Negative    Urobilinogen, UA 2.0-3.0 (A) <2.0 EU/dL    Leukocyte Esterase, UA Negative Negative   Lactic acid, plasma #2    Collection Time: 04/01/25  6:04 PM   Result Value Ref Range    Lactic Acid Level 2.3 (H) 0.5 - 2.2 mmol/L   POCT glucose    Collection Time: 04/01/25  8:16 PM   Result Value Ref Range    POCT Glucose 127 (H) 70 - 110 mg/dL   Comprehensive metabolic panel    Collection Time: 04/02/25  4:35 AM   Result Value Ref Range    Sodium 134 (L) 136 - 145 mmol/L    Potassium 3.7 3.5 - 5.1 mmol/L    Chloride 105 95 - 110 mmol/L    CO2 21 (L) 23 - 29 mmol/L    Glucose 106 70 - 110 mg/dL    BUN 7 (L) 8 - 23 mg/dL    Creatinine 0.5 0.5 - 1.4 mg/dL    Calcium 9.0 8.7 - 10.5 mg/dL    Protein Total 7.2 6.0 - 8.4 gm/dL    Albumin 2.7 (L) 3.5 - 5.2 g/dL    Bilirubin Total 0.7 0.1 - 1.0 mg/dL     40 - 150 unit/L    AST 59 (H) 11 - 45 unit/L     (H) 10 - 44 unit/L    Anion Gap 8 8 - 16 mmol/L    eGFR >60 >60 mL/min/1.73/m2   CBC with Differential    Collection Time: 04/02/25  4:35 AM   Result Value Ref Range    WBC 2.70 (L) 3.90 - 12.70 K/uL    RBC 4.56 4.00 - 5.40 M/uL    HGB 11.9 (L) 12.0 - 16.0 gm/dL    HCT 34.9 (L) 37.0 - 48.5 %    MCV 77 (L) 82 - 98 fL    MCH 26.1 (L) 27.0 - 31.0 pg    MCHC 34.1 32.0 - 36.0 g/dL    RDW 19.2 (H) 11.5 - 14.5 %    Platelet Count 83 (L) 150 - 450 K/uL    MPV 8.7 (L) 9.2 - 12.9 fL    Nucleated RBC 0 <=0 /100 WBC    Neut % 81.9 (H) 38 - 73 %    Lymph % 13.0 (L) 18 - 48 %    Mono % 3.3 (L) 4 - 15 %    Eos % 0.0 <=8 %     Basophil % 0.7 <=1.9 %    Imm Grans % 1.1 (H) 0.0 - 0.5 %    Neut # 2.21 1.8 - 7.7 K/uL    Lymph # 0.35 (L) 1 - 4.8 K/uL    Mono # 0.09 (L) 0.3 - 1 K/uL    Eos # 0.00 <=0.5 K/uL    Baso # 0.02 <=0.2 K/uL    Imm Grans # 0.03 0.00 - 0.04 K/uL   Platelet Review    Collection Time: 04/02/25  4:35 AM   Result Value Ref Range    Platelet Estimate Decreased (A)         RADIOLOGY  CT Chest Abdomen Pelvis With IV Contrast (XPD) Routine Oral Contrast  Result Date: 4/1/2025  EXAM: CT CHEST ABDOMEN PELVIS WITH IV CONTRAST (XPD) CLINICAL HISTORY: Sepsis COMPARISON: 12/11/2024 TECHNIQUE: Standard thin-section axial images, with reformatted sagittal and coronal images. FINDINGS: Chest: There is no mediastinal, hilar or axillary lymphadenopathy.  No pericardial effusion.  Moderate severe coronary artery calcification. Multifocal groundglass opacities throughout the left lung most pronounced in the lower lobe.  Additional linear bands of atelectasis in the left upper lobe posteriorly extending to the major fissure. Minimal atelectasis at the right lung base.  No pleural effusions.  No pulmonary nodules. ABDOMEN: Cholecystectomy.  Liver, spleen, pancreas, adrenals, kidneys and ureters are unremarkable. Vascular structures are unremarkable with the exception of atherosclerotic calcification. No abdominal or retroperitoneal lymphadenopathy.  No ascites or fat stranding within the abdomen.  No dilatation of the large or small bowel.  No evidence for appendicitis.  Postsurgical changes at the stomach. Pelvis: Small amount of air within the bladder.  No pelvic free fluid, iliac chain or inguinal lymphadenopathy.  Atrophic uterus and adnexal regions are unremarkable. Osseous structures: Small fat-containing anterior abdominal wall defect just below the umbilicus without evidence for incarceration.  No concerning lytic or sclerotic bony lesions.     1.  Multifocal groundglass opacities throughout the left lung most pronounced in the lower  lobe, infectious or inflammatory. 2.  Otherwise, no additional acute findings. All CT scans at [this location] are performed using dose modulation techniques as appropriate to a performed exam including the following:  Automated exposure control; adjustment of the mA and/or kV according to patient size (this includes techniques or standardized protocols for targeted exams where dose is matched to indication / reason for exam; i.e. extremities or head); use of iterative reconstruction technique. Finalized on: 4/1/2025 6:16 PM By:  Raymond Boggs [facility]# [id number]      2025-04-01 18:18:33.030     [facility]    X-Ray Chest AP Portable  Result Date: 4/1/2025  EXAMINATION: XR CHEST AP PORTABLE CLINICAL HISTORY: , Sepsis; COMPARISON: Chest, 02/27/2025 FINDINGS: One view.  Stable heart size.  Discoid atelectasis mid left lung.  No definite infiltrates.     No acute infiltrates. Electronically signed by: Juliette Prakash MD Date:    04/01/2025 Time:    12:44      EKG    MICROBIOLOGY    MDM     Amount and/or Complexity of Data Reviewed  Clinical lab tests: reviewed  Tests in the radiology section of CPT®: reviewed  Tests in the medicine section of CPT®: reviewed  Discussion of test results with the performing providers: yes  Decide to obtain previous medical records or to obtain history from someone other than the patient: yes  Obtain history from someone other than the patient: yes  Review and summarize past medical records: yes  Discuss the patient with other providers: yes  Independent visualization of images, tracings, or specimens: yes        Assessment/Plan:     Assessment & Plan  Sepsis  This patient does have evidence of infective focus  My overall impression is sepsis.  Source: Respiratory  Antibiotics given-   Antibiotics (72h ago, onward)      Start     Stop Route Frequency Ordered    04/01/25 2000  doxycycline 100 mg in D5W 100 mL IVPB (MB+)         -- IV Every 12 hours (non-standard times) 04/01/25 1958 04/01/25 0500   ceFEPIme injection 2 g         -- IV Every 12 hours (non-standard times) 04/01/25 1958          Latest lactate reviewed-  Recent Labs   Lab 04/01/25  1804   LACTATE 2.3*       Fluid challenge Ideal Body Weight- The patient's ideal body weight is Ideal body weight: 39.4 kg (86 lb 12.3 oz) which will be used to calculate fluid bolus of 30 ml/kg for treatment of septic shock.      Post- resuscitation assessment No - Post resuscitation assessment not needed       Will Not start Pressors- Levophed for MAP of 65  Source control achieved by: IVFs, Abx,    Multifocal pneumonia  Patient has a diagnosis of pneumonia. The cause of the pneumonia is unknown at this time. The pneumonia is stable. The patient has the following signs/symptoms of pneumonia: cough, shortness of breath, and chest pain. The patient does not have a current oxygen requirement and the patient does not have a home oxygen requirement. I have reviewed the pertinent imaging. The following cultures have been collected: Blood cultures and Sputum culture The culture results are listed below.     Current antimicrobial regimen consists of the antibiotics listed below. Will monitor patient closely and continue current treatment plan unchanged.    Antibiotics (From admission, onward)      Start     Stop Route Frequency Ordered    04/01/25 2000  doxycycline 100 mg in D5W 100 mL IVPB (MB+)         -- IV Every 12 hours (non-standard times) 04/01/25 1958 04/01/25 0500  ceFEPIme injection 2 g         -- IV Every 12 hours (non-standard times) 04/01/25 1958            Microbiology Results (last 7 days)       Procedure Component Value Units Date/Time    Blood culture x two cultures. Draw prior to antibiotics. [[id number]] Collected: 04/01/25 1249    Order Status: Resulted Specimen: Blood from Peripheral, Antecubital, Right Updated: 04/01/25 1541    Influenza A & B by Molecular [[id number]]  (Normal) Collected: 04/01/25 1229    Order Status: Completed Specimen:  Nasopharyngeal Swab Updated: 04/01/25 1301     INFLUENZA A MOLECULAR Negative     INFLUENZA B MOLECULAR  Negative    Blood culture x two cultures. Draw prior to antibiotics. [[id number]] Collected: 04/01/25 1252    Order Status: Resulted Specimen: Blood from Peripheral, Forearm, Right Updated: 04/01/25 1254          Hypotension  Improved with IVFs.  Plan:  -continue IVFs  -hold bp meds until bp stabilizes    Controlled type 2 diabetes mellitus without complication, without long-term current use of insulin  Patient's FSGs are controlled on current medication regimen.  Last A1c reviewed-   Lab Results   Component Value Date    HGBA1C 5.7 (H) 08/28/2024     Most recent fingerstick glucose reviewed-   Recent Labs   Lab 04/01/25  2016   POCTGLUCOSE 127*     Current correctional scale  Low  Maintain anti-hyperglycemic dose as follows-   Antihyperglycemics (From admission, onward)      Start     Stop Route Frequency Ordered    04/01/25 2100  insulin aspart U-100 pen 0-5 Units         -- SubQ Before meals & nightly PRN 04/01/25 2001          Plan:  -SSI  -A1c  -Accu-checks  -Hold oral hypoglycemics while patient is in the hospital  -Continue home long-acting insulin at 25-50% decrease, titrate up as needed  -Hypoglycemic protocol      Primary hypertension  Chronic, controlled.  Latest blood pressure and vitals reviewed-   Temp:  [98.5 °F (36.9 °C)-99.5 °F (37.5 °C)]   Pulse:  []   Resp:  [16-20]   BP: ()/(50-75)   SpO2:  [91 %-99 %] .   Home meds for hypertension were reviewed and noted below.   Hypertension Medications              losartan (COZAAR) 25 MG tablet Take 1 tablet (25 mg total) by mouth once daily.    metoprolol succinate (TOPROL-XL) 50 MG 24 hr tablet Take 1 tablet (50 mg total) by mouth once daily.     While in the hospital, will manage blood pressure as follows; Adjust home antihypertensive regimen as follows- hold bp meds due to hypotension, resume once bp stable    Will utilize p.r.n. blood  pressure medication only if patient's blood pressure greater than  160/90 and she develops symptoms such as worsening chest pain or shortness of breath.    Malignant neoplasm of lower lobe of left lung  Followed by Dr. Moran. Last evaluated by on 3/28/25.  Per clinic noted from visit:  - Returns for final dose of concurrent carboplatin Taxol external beam radiation therapy patient is tolerating therapy well denies nausea chills sweats status.   - Return to clinic in 1 month CBC CMP TSH will discuss use of maintenance immunotherapy at that point.   Plan:  - Hem/Onc consult    Paroxysmal SVT (supraventricular tachycardia)  -tele monitoring  -continue metoprolol    GERD (gastroesophageal reflux disease)  Chronic. Stable. Currently asymptomatic. Home medications include PPI/Antacids as needed.  Plan:  -Continue PPI/Antacids as needed     Hyperlipidemia associated with type 2 diabetes mellitus  Patient is not chronically on statin.will not continue for now. Last Lipid Panel:   Lab Results   Component Value Date    CHOL 184 12/26/2023    HDL 48 12/26/2023    LDLCALC 110.4 12/26/2023    TRIG 128 12/26/2023    CHOLHDL 26.1 12/26/2023     Plan:  -low fat/low calorie diet    Insomnia  -continue amitriptyline and Lunesta    Major depressive disorder, single episode, mild  Chronic. Stable. Not in acute exacerbation and currently denies endorsing any suicidal/homicidal ideations.   Plan:  -Continue home medications (amitriptyline)    Anxiety  Chronic. Stable. Not in acute exacerbation and currently denies endorsing any suicidal/homicidal ideations.   Plan:  -Continue home medications (ativan)      VTE Risk Mitigation (From admission, onward)           Ordered     enoxaparin injection 40 mg  Daily         04/01/25 2001     IP VTE HIGH RISK PATIENT  Once         04/01/25 2001     Place sequential compression device  Until discontinued         04/01/25 2001                     //Core Measures   -DVT proph: SCDs, lovenox  -Code  status Full    -Surrogate:none present    Components of this note were documented using a voice recognition system and are subject to errors not corrected at the time the document was proof read. Please contact the author for any clarifications.       Archie Osei NP  Department of Hospital Medicine  [facility] - Emergency Dept.

## 2025-04-02 NOTE — ASSESSMENT & PLAN NOTE
Patient is not chronically on statin.will not continue for now. Last Lipid Panel:   Lab Results   Component Value Date    CHOL 184 12/26/2023    HDL 48 12/26/2023    LDLCALC 110.4 12/26/2023    TRIG 128 12/26/2023    CHOLHDL 26.1 12/26/2023     Plan:  -low fat/low calorie diet

## 2025-04-02 NOTE — HPI
Cassandra Campos is a 74 y.o. female with a PMH  has a past medical history of Arthritis, Bilateral bunions, Borderline glaucoma, De Quervain's disease (radial styloid tenosynovitis), Gastritis, Hydradenitis, Hyperlipidemia, Hypertension, Insomnia, Lung cancer, Migraines (02/01/2000), Nasal septum perforation, Obesity, Pneumonia, Restrictive airway disease, Sleep apnea, SVT (supraventricular tachycardia) (09/2013), Trigger finger, and Type 2 diabetes mellitus (2012).  Presented to the ER for evaluation for low blood pressure.  Patient was sent over from Dr. Zhang's office where [facility], but her session was held due to her low BP reading in the office.  Patient reports she felt more fatigued with shortness of breath mild chest pain, productive cough, and lightheadedness/dizziness which started yesterday.  Denied any specific alleviating or aggravating factors.  Denies any fevers, aches, chills, sweats, nausea, vomiting, diarrhea, abdominal pain, or any other symptoms at this time.    ER workup revealed CBC to be at baseline.  CMP revealed CBG of 154 mg/dL AST/ALT of 177/163.  Troponin 0.276.  Procalcitonin level 6.41.  Lactic acid level 5.1 with repeat 2.3.  Flu/COVID negative.  UA unremarkable.  Blood cultures pending.  Chest x-ray showed no acute infiltrates.  CT chest abdomen and pelvis with IV contrast revealed multifocal ground-glass opacities to left lung most prominent in the lower lobe infectious versus inflammatory process.  EKG revealed sinus tachycardia with a ventricular rate of 110 beats per minute QT/QTC of 342/462.  Patient received 2 g of cefepime and 1,182 cc normal saline in the ER.  Hospital Medicine consulted to admit patient for sepsis likely secondary to multifocal pneumonia.  Patient in agreement with treatment plan.  Patient admitted under inpatient status.    PCP: Emil Zhang

## 2025-04-02 NOTE — PLAN OF CARE
[facility]  Initial Discharge Assessment       Primary Care Provider: Emil Zhang MD    Admission Diagnosis: Lactic acidosis [E87.20]  Screening for cardiovascular condition [Z13.6]  Troponin level elevated [R79.89]  Chest pain [R07.9]  Neutropenic fever [D70.9, R50.81]  Severe sepsis [A41.9, R65.20]    Admission Date: 4/1/2025  Expected Discharge Date: per attending         Payor: [facility] MCARE / Plan: [employer] MEDICARE ADVANTAGE / Product Type: Medicare Advantage /     Extended Emergency Contact Information  Primary Emergency Contact: MonroyClaude   [place]  Mobile Phone: [phone]  Relation: Son  Secondary Emergency Contact: Joseph Diez  Mobile Phone: [phone]  Relation: Relative    Discharge Plan A: Home         [facility]  [address] [place] [address]  Phone: [phone] Fax: [phone]      Initial Assessment (most recent)       Adult Discharge Assessment - 04/02/25 0930          Discharge Assessment    Assessment Type Discharge Planning Assessment     Confirmed/corrected address, phone number and insurance Yes     Confirmed Demographics Correct on Facesheet     Source of Information patient     Communicated OLMAN with patient/caregiver Date not available/Unable to determine     Reason For Admission sepsis     People in Home alone     Do you expect to return to your current living situation? Yes     Do you have help at home or someone to help you manage your care at home? No     Prior to hospitilization cognitive status: Alert/Oriented     Current cognitive status: Alert/Oriented     Walking or Climbing Stairs Difficulty no     Dressing/Bathing Difficulty no     Equipment Currently Used at Home none     Readmission within 30 days? No     Patient currently being followed by outpatient case management? No     Do you currently have service(s) that help you manage your care at home? No     Do you take  prescription medications? Yes     Do you have prescription coverage? Yes     Coverage [facility]     Do you have any problems affording any of your prescribed medications? No     Is the patient taking medications as prescribed? yes     Who is going to help you get home at discharge? son     How do you get to doctors appointments? family or friend will provide;car, drives self     Are you on dialysis? No     Do you take coumadin? No     Discharge Plan A Home                      SW completed an assessment with pt at bedside. SW confirmed all needed information. Pt has no needs at this time. SW to f/u if anything is recommended for pt.

## 2025-04-02 NOTE — ASSESSMENT & PLAN NOTE
Patient's FSGs are controlled on current medication regimen.  Last A1c reviewed-   Lab Results   Component Value Date    HGBA1C 5.7 (H) 08/28/2024     Most recent fingerstick glucose reviewed-   Recent Labs   Lab 04/01/25 2016 04/02/25  1230   POCTGLUCOSE 127* 168*     Current correctional scale  Low  Maintain anti-hyperglycemic dose as follows-   Antihyperglycemics (From admission, onward)      Start     Stop Route Frequency Ordered    04/01/25 2100  insulin aspart U-100 pen 0-5 Units         -- SubQ Before meals & nightly PRN 04/01/25 2001          Not on oral medication

## 2025-04-02 NOTE — ASSESSMENT & PLAN NOTE
This patient does have evidence of infective focus  My overall impression is sepsis.  Source: Respiratory  Antibiotics given-   Antibiotics (72h ago, onward)      Start     Stop Route Frequency Ordered    04/02/25 1300  ceFEPIme injection 2 g         -- IV Every 8 hours (non-standard times) 04/02/25 1212    04/01/25 2000  doxycycline 100 mg in D5W 100 mL IVPB (MB+)         -- IV Every 12 hours (non-standard times) 04/01/25 1958          Latest lactate reviewed-  Recent Labs   Lab 04/01/25  1804   LACTATE 2.3*       Fluid challenge Ideal Body Weight- The patient's ideal body weight is Ideal body weight: 39.4 kg (86 lb 12.3 oz) which will be used to calculate fluid bolus of 30 ml/kg for treatment of septic shock.      Post- resuscitation assessment No - Post resuscitation assessment not needed       Will Not start Pressors- Levophed for MAP of 65  Source control achieved by: IVFs, Abx,

## 2025-04-02 NOTE — PROGRESS NOTES
Pharmacist Renal Dose Adjustment Note    Cassandra Campos is a 74 y.o. female being treated with the medication cefepime.    Patient Data:    Vital Signs (Most Recent):  Temp: 99.3 °F (37.4 °C) (04/02/25 0822)  Pulse: 108 (04/02/25 1125)  Resp: 19 (04/02/25 0822)  BP: 133/77 (04/02/25 0822)  SpO2: (!) 94 % (04/02/25 0822) Vital Signs (72h Range):  Temp:  [98.5 °F (36.9 °C)-99.5 °F (37.5 °C)]   Pulse:  []   Resp:  [16-20]   BP: ()/(50-77)   SpO2:  [91 %-99 %]      Recent Labs   Lab 03/28/25  1146 04/01/25  1249 04/02/25  0435   CREATININE 0.6 1.0 0.5     Serum creatinine: 0.5 mg/dL 04/02/25 0435  Estimated creatinine clearance: 79.5 mL/min    Cefepime 2 g every 12 hours was adjusted to every 8 hours according to [facility] renal dose adjustment policy.    Pharmacist's Name: Kristen Quijano, PharmNABEEL 4/2/2025 12:12 PM    Pharmacist's Extension: [phone]

## 2025-04-02 NOTE — SUBJECTIVE & OBJECTIVE
Oncology Treatment Plan:   OP NSCLC PACLitaxel CARBOplatin QW + radiation    Medications:  Continuous Infusions:  Scheduled Meds:   amitriptyline  50 mg Oral QHS    ceFEPime IV (PEDS and ADULTS)  2 g Intravenous Q12H    doxycycline IV (PEDS and ADULTS)  100 mg Intravenous Q12H    enoxparin  40 mg Subcutaneous Daily    LORazepam  1 mg Oral BID    metoprolol succinate  50 mg Oral Daily     PRN Meds:  Current Facility-Administered Medications:     aluminum-magnesium hydroxide-simethicone, 30 mL, Oral, QID PRN    dextrose 50%, 12.5 g, Intravenous, PRN    dextrose 50%, 25 g, Intravenous, PRN    glucagon (human recombinant), 1 mg, Intramuscular, PRN    glucose, 16 g, Oral, PRN    glucose, 24 g, Oral, PRN    insulin aspart U-100, 0-5 Units, Subcutaneous, QID (AC + HS) PRN    naloxone, 0.02 mg, Intravenous, PRN    ondansetron, 4 mg, Intravenous, Q8H PRN    oxyCODONE-acetaminophen, 1 tablet, Oral, Q4H PRN    polyethylene glycol, 17 g, Oral, Daily PRN    promethazine, 25 mg, Oral, Q6H PRN    simethicone, 1 tablet, Oral, QID PRN    sodium chloride 0.9%, 10 mL, Intravenous, PRN    sodium chloride 0.9%, 10 mL, Intravenous, PRN    sodium chloride 0.9%, 3 mL, Intravenous, Q12H PRN    zolpidem, 5 mg, Oral, Nightly PRN     Review of patient's allergies indicates:  No Known Allergies     Past Medical History:   Diagnosis Date    Arthritis     hands    Bilateral bunions     Borderline glaucoma     De Quervain's disease (radial styloid tenosynovitis)     Gastritis     upper GI 2/2017    Hydradenitis     Hyperlipidemia     Hypertension     Insomnia     Lung cancer     Migraines 02/01/2000    Nasal septum perforation     Obesity     Pneumonia     Restrictive airway disease     Sleep apnea     SVT (supraventricular tachycardia) 09/2013    Trigger finger     Type 2 diabetes mellitus 2012     am 02/01/2024     Past Surgical History:   Procedure Laterality Date    AXILLARY HIDRADENITIS EXCISION Bilateral     BONE EXOSTOSIS EXCISION  Right 2018    Procedure: EXCISION, EXOSTOSIS;  Surgeon: Jayro Pedraza Sr., MD;  Location: [facility];  Service: Orthopedics;  Laterality: Right;    BREAST BIOPSY Bilateral     both benign    BREAST SURGERY  1998    BRONCHOSCOPY Bilateral 1/15/2025    Procedure: Bronchoscopy - insert lighted tube into airway to take a biopsy of lung;  Surgeon: Adrian Robin MD;  Location: [facility];  Service: Pulmonary;  Laterality: Bilateral;    CARPAL TUNNEL RELEASE      bilateral    CARPAL TUNNEL RELEASE Right 2024    Procedure: RELEASE, CARPAL TUNNEL;  Surgeon: Jaime Oswald MD;  Location: [facility];  Service: Orthopedics;  Laterality: Right;    CARPAL TUNNEL RELEASE Left 2024    Procedure: RELEASE, CARPAL TUNNEL;  Surgeon: Jaime Oswald MD;  Location: [facility];  Service: Orthopedics;  Laterality: Left;    CATARACT EXTRACTION Bilateral     OU     SECTION  1979    CHOLECYSTECTOMY  2014    COLONOSCOPY N/A 10/02/2020    Procedure: COLONOSCOPY;  Surgeon: Tushar Edwards MD;  Location: [facility];  Service: Endoscopy;  Laterality: N/A;    COLONOSCOPY W/ POLYPECTOMY  10/02/2020    Polyps x3, repeat 5 years; Tushar Edwards MD     CYST REMOVAL  2015    sebaceous cyst removed from face    DE QUERVAIN'S RELEASE Left 2020    Procedure: RELEASE, HAND, FOR DEQUERVAIN'S TENOSYNOVITIS;  Surgeon: Jaime Oswald MD;  Location: [facility];  Service: Orthopedics;  Laterality: Left;    DE QUERVAIN'S RELEASE Right 2020    Procedure: RELEASE, HAND, FOR DEQUERVAIN'S TENOSYNOVITIS;  Surgeon: Jaime Oswald MD;  Location: [facility];  Service: Orthopedics;  Laterality: Right;    ENDOBRONCHIAL ULTRASOUND Bilateral 04/10/2024    Procedure: ENDOBRONCHIAL ULTRASOUND (EBUS);  Surgeon: Adrian Robin MD;  Location: [facility];  Service: Pulmonary;  Laterality: Bilateral;    ENDOBRONCHIAL ULTRASOUND Bilateral 1/15/2025    Procedure: ENDOBRONCHIAL ULTRASOUND (EBUS);  Surgeon:  Adrian Robin MD;  Location: [facility];  Service: Pulmonary;  Laterality: Bilateral;    EYE SURGERY      gastric sleeve  02/13/2017    Dr. Watson    INJECTION OF ANESTHETIC AGENT AROUND MULTIPLE INTERCOSTAL NERVES  5/10/2024    Procedure: BLOCK, NERVE, INTERCOSTAL, 2 OR MORE;  Surgeon: Mike Nuñez MD;  Location: [facility] OR;  Service: Cardiothoracic;;    KNEE SURGERY Right     OLECRANON BURSECTOMY Right 07/25/2018    Procedure: BURSECTOMY, OLECRANON;  Surgeon: Jayro Pedraza Sr., MD;  Location: [facility] OR;  Service: Orthopedics;  Laterality: Right;    SURGICAL REMOVAL OF BUNION WITH OSTEOTOMY OF METATARSAL BONE Left 05/10/2019    Procedure: BUNIONECTOMY, WITH METATARSAL OSTEOTOMY;  Surgeon: Srinivasan Villanueva DPM;  Location: [facility] OR;  Service: Podiatry;  Laterality: Left;    SURGICAL REMOVAL OF BUNION WITH OSTEOTOMY OF METATARSAL BONE Right 06/28/2019    Procedure: BUNIONECTOMY, WITH METATARSAL OSTEOTOMY;  Surgeon: Srinivasan Villanueva DPM;  Location: [facility] OR;  Service: Podiatry;  Laterality: Right;    SURGICAL REMOVAL OF LYMPH NODE Left 5/10/2024    Procedure: EXCISION, LYMPH NODE;  Surgeon: Mike Nuñez MD;  Location: [facility] OR;  Service: Cardiothoracic;  Laterality: Left;    TONSILLECTOMY, ADENOIDECTOMY  1980s    TRANSESOPHAGEAL ECHOCARDIOGRAM WITH POSSIBLE CARDIOVERSION (LAKESHIA W/ POSS CARDIOVERSION) N/A 5/15/2024    Procedure: Transesophageal echo (LAKESHIA) intra-procedure log documentation;  Surgeon: Twan Pelaez MD;  Location: [facility] CATH LAB;  Service: Cardiology;  Laterality: N/A;    TRIGGER FINGER RELEASE Right 04/01/2015    Dr. Pedraza    XI ROBOTIC RATS,WITH LOBECTOMY,LUNG Left 5/10/2024    Procedure: XI ROBOTIC RATS,WITH LOBECTOMY,LUNG;  Surgeon: Mike Nuñez MD;  Location: [facility] OR;  Service: Cardiothoracic;  Laterality: Left;  Lingulectomy     Family History       Problem Relation (Age of Onset)    Diabetes Maternal Aunt, Cousin    Hypertension Maternal Grandmother    Prostate cancer Brother           Tobacco Use    Smoking status: Former     Current packs/day: 0.00     Average packs/day: 0.5 packs/day for 42.0 years (21.0 ttl pk-yrs)     Types: Cigarettes     Start date: 1970     Quit date: 2012     Years since quittin.2     Passive exposure: Past    Smokeless tobacco: Never   Substance and Sexual Activity    Alcohol use: Not Currently     Alcohol/week: 1.0 standard drink of alcohol     Types: 1 Glasses of wine per week     Comment: Glass red wine once a every 2 weeks    Drug use: Never    Sexual activity: Not Currently     Partners: Female     Birth control/protection: Abstinence, None       Review of Systems   Constitutional:  Positive for activity change, fatigue and fever. Negative for appetite change, chills, diaphoresis and unexpected weight change.   HENT:  Negative for congestion, dental problem, drooling, ear discharge, ear pain, facial swelling, hearing loss, mouth sores, nosebleeds, postnasal drip, rhinorrhea, sinus pressure, sneezing, sore throat, tinnitus, trouble swallowing and voice change.    Eyes:  Negative for photophobia, pain, discharge, redness, itching and visual disturbance.   Respiratory:  Positive for cough and shortness of breath. Negative for choking, chest tightness, wheezing and stridor.    Cardiovascular:  Positive for chest pain. Negative for palpitations and leg swelling.   Gastrointestinal:  Negative for abdominal distention, abdominal pain, anal bleeding, blood in stool, constipation, diarrhea, nausea, rectal pain and vomiting.   Endocrine: Negative for cold intolerance, heat intolerance, polydipsia, polyphagia and polyuria.   Genitourinary:  Negative for decreased urine volume, difficulty urinating, dyspareunia, dysuria, enuresis, flank pain, frequency, genital sores, hematuria, menstrual problem, pelvic pain, urgency, vaginal bleeding, vaginal discharge and vaginal pain.   Musculoskeletal:  Positive for gait problem. Negative for arthralgias, back pain, joint  swelling, myalgias, neck pain and neck stiffness.   Skin:  Negative for color change, pallor and rash.   Allergic/Immunologic: Negative for environmental allergies, food allergies and immunocompromised state.   Neurological:  Positive for weakness. Negative for dizziness, tremors, seizures, syncope, facial asymmetry, speech difficulty, light-headedness, numbness and headaches.   Hematological:  Negative for adenopathy. Does not bruise/bleed easily.   Psychiatric/Behavioral:  Positive for dysphoric mood. Negative for agitation, behavioral problems, confusion, decreased concentration, hallucinations, self-injury, sleep disturbance and suicidal ideas. The patient is nervous/anxious. The patient is not hyperactive.      Objective:     Vital Signs (Most Recent):  Temp: 99.5 °F (37.5 °C) (04/01/25 1202)  Pulse: 103 (04/02/25 0600)  Resp: 20 (04/02/25 0600)  BP: 123/66 (04/02/25 0600)  SpO2: (!) 93 % (04/02/25 0600) Vital Signs (24h Range):  Temp:  [98.5 °F (36.9 °C)-99.5 °F (37.5 °C)] 99.5 °F (37.5 °C)  Pulse:  [] 103  Resp:  [16-20] 20  SpO2:  [91 %-99 %] 93 %  BP: ()/(50-75) 123/66     Weight: 70.5 kg (155 lb 6.8 oz)  Body mass index is 38.9 kg/m².  Body surface area is 1.62 meters squared.      Intake/Output Summary (Last 24 hours) at 4/2/2025 0638  Last data filed at 4/1/2025 2114  Gross per 24 hour   Intake 99.52 ml   Output --   Net 99.52 ml        Physical Exam  Vitals reviewed.   Constitutional:       General: She is not in acute distress.     Appearance: She is well-developed. She is ill-appearing. She is not diaphoretic.   HENT:      Head: Normocephalic and atraumatic.      Right Ear: External ear normal.      Left Ear: External ear normal.      Nose: Nose normal.      Right Sinus: No maxillary sinus tenderness or frontal sinus tenderness.      Left Sinus: No maxillary sinus tenderness or frontal sinus tenderness.      Mouth/Throat:      Pharynx: No oropharyngeal exudate.   Eyes:      General: Lids  are normal. No scleral icterus.        Right eye: No discharge.         Left eye: No discharge.      Conjunctiva/sclera: Conjunctivae normal.      Right eye: Right conjunctiva is not injected. No hemorrhage.     Left eye: Left conjunctiva is not injected. No hemorrhage.     Pupils: Pupils are equal, round, and reactive to light.   Neck:      Thyroid: No thyromegaly.      Vascular: No JVD.      Trachea: No tracheal deviation.   Cardiovascular:      Rate and Rhythm: Normal rate.   Pulmonary:      Effort: Pulmonary effort is normal. No respiratory distress.      Breath sounds: No stridor.   Chest:      Chest wall: No tenderness.   Abdominal:      General: Bowel sounds are normal. There is no distension.      Palpations: Abdomen is soft. There is no hepatomegaly, splenomegaly or mass.      Tenderness: There is no abdominal tenderness. There is no rebound.   Musculoskeletal:         General: No tenderness. Normal range of motion.      Cervical back: Normal range of motion and neck supple.   Lymphadenopathy:      Cervical: No cervical adenopathy.      Upper Body:      Right upper body: No supraclavicular adenopathy.      Left upper body: No supraclavicular adenopathy.   Skin:     General: Skin is dry.      Findings: No erythema or rash.   Neurological:      Mental Status: She is alert and oriented to person, place, and time.      Cranial Nerves: No cranial nerve deficit.      Coordination: Coordination normal.   Psychiatric:         Behavior: Behavior normal.         Thought Content: Thought content normal.         Judgment: Judgment normal.          Significant Labs:   BMP:   Recent Labs   Lab 04/01/25  1249 04/02/25  0435   * 134*   K 4.2 3.7    105   CO2 21* 21*   BUN 13 7*   CREATININE 1.0 0.5   CALCIUM 9.1 9.0   MG 1.6  --    , CBC:   Recent Labs   Lab 04/01/25  1249 04/02/25  0435   WBC 2.10* 2.70*   HGB 12.3 11.9*   HCT 36.6* 34.9*   * 83*   , CMP:   Recent Labs   Lab 04/01/25  1249  "04/02/25  0435   * 134*   K 4.2 3.7    105   CO2 21* 21*   BUN 13 7*   CREATININE 1.0 0.5   CALCIUM 9.1 9.0   ALBUMIN 3.1* 2.7*   BILITOT 0.9 0.7   ALKPHOS 115 101   * 59*   * 103*   ANIONGAP 12 8   , Coagulation: No results for input(s): "PT", "INR", "APTT" in the last 48 hours., Haptoglobin: No results for input(s): "HAPTOGLOBIN" in the last 48 hours., Immunology: No results for input(s): "SPEP", "LORNA", "DEBORAH", "FREELAMBDALI" in the last 48 hours., LDH: No results for input(s): "LDHCSF", "BFSOURCE" in the last 48 hours., LFTs:   Recent Labs   Lab 04/01/25  1249 04/02/25  0435   * 103*   * 59*   ALKPHOS 115 101   BILITOT 0.9 0.7   ALBUMIN 3.1* 2.7*   , Reticulocytes: No results for input(s): "RETIC" in the last 48 hours., Tumor Markers: No results for input(s): "PSA", "CEA", "", "AFPTM", "[id number]", "" in the last 48 hours.    Invalid input(s): "ALGTM", and Uric Acid No results for input(s): "URICACID" in the last 48 hours.    Diagnostic Results:  I have reviewed all pertinent imaging results/findings within the past 24 hours.  "

## 2025-04-02 NOTE — ASSESSMENT & PLAN NOTE
Chronic, controlled.  Latest blood pressure and vitals reviewed-   Temp:  [99.3 °F (37.4 °C)]   Pulse:  []   Resp:  [16-20]   BP: ()/(55-77)   SpO2:  [91 %-95 %] .   Home meds for hypertension were reviewed and noted below.   Hypertension Medications              losartan (COZAAR) 25 MG tablet Take 1 tablet (25 mg total) by mouth once daily.    metoprolol succinate (TOPROL-XL) 50 MG 24 hr tablet Take 1 tablet (50 mg total) by mouth once daily.            While in the hospital, will manage blood pressure as follows; Adjust home antihypertensive regimen as follows- hold bp meds due to hypotension, resume once bp stable    Will utilize p.r.n. blood pressure medication only if patient's blood pressure greater than  160/90 and she develops symptoms such as worsening chest pain or shortness of breath.

## 2025-04-02 NOTE — SUBJECTIVE & OBJECTIVE
Oncology Treatment Plan:   OP NSCLC PACLitaxel CARBOplatin QW + radiation    Medications:  Continuous Infusions:  Scheduled Meds:   amitriptyline  50 mg Oral QHS    ceFEPime IV (PEDS and ADULTS)  2 g Intravenous Q12H    doxycycline IV (PEDS and ADULTS)  100 mg Intravenous Q12H    enoxparin  40 mg Subcutaneous Daily    LORazepam  1 mg Oral BID    metoprolol succinate  50 mg Oral Daily     PRN Meds:  Current Facility-Administered Medications:     aluminum-magnesium hydroxide-simethicone, 30 mL, Oral, QID PRN    dextrose 50%, 12.5 g, Intravenous, PRN    dextrose 50%, 25 g, Intravenous, PRN    glucagon (human recombinant), 1 mg, Intramuscular, PRN    glucose, 16 g, Oral, PRN    glucose, 24 g, Oral, PRN    insulin aspart U-100, 0-5 Units, Subcutaneous, QID (AC + HS) PRN    naloxone, 0.02 mg, Intravenous, PRN    ondansetron, 4 mg, Intravenous, Q8H PRN    oxyCODONE-acetaminophen, 1 tablet, Oral, Q4H PRN    polyethylene glycol, 17 g, Oral, Daily PRN    promethazine, 25 mg, Oral, Q6H PRN    simethicone, 1 tablet, Oral, QID PRN    sodium chloride 0.9%, 10 mL, Intravenous, PRN    sodium chloride 0.9%, 10 mL, Intravenous, PRN    sodium chloride 0.9%, 3 mL, Intravenous, Q12H PRN    zolpidem, 5 mg, Oral, Nightly PRN     Review of patient's allergies indicates:  No Known Allergies     Past Medical History:   Diagnosis Date    Arthritis     hands    Bilateral bunions     Borderline glaucoma     De Quervain's disease (radial styloid tenosynovitis)     Gastritis     upper GI 2/2017    Hydradenitis     Hyperlipidemia     Hypertension     Insomnia     Lung cancer     Migraines 02/01/2000    Nasal septum perforation     Obesity     Pneumonia     Restrictive airway disease     Sleep apnea     SVT (supraventricular tachycardia) 09/2013    Trigger finger     Type 2 diabetes mellitus 2012     am 02/01/2024     Past Surgical History:   Procedure Laterality Date    AXILLARY HIDRADENITIS EXCISION Bilateral     BONE EXOSTOSIS EXCISION  Right 2018    Procedure: EXCISION, EXOSTOSIS;  Surgeon: Jayro Pedraza Sr., MD;  Location: [facility];  Service: Orthopedics;  Laterality: Right;    BREAST BIOPSY Bilateral     both benign    BREAST SURGERY  1998    BRONCHOSCOPY Bilateral 1/15/2025    Procedure: Bronchoscopy - insert lighted tube into airway to take a biopsy of lung;  Surgeon: Adrian Robin MD;  Location: [facility];  Service: Pulmonary;  Laterality: Bilateral;    CARPAL TUNNEL RELEASE      bilateral    CARPAL TUNNEL RELEASE Right 2024    Procedure: RELEASE, CARPAL TUNNEL;  Surgeon: Jaime Oswald MD;  Location: [facility];  Service: Orthopedics;  Laterality: Right;    CARPAL TUNNEL RELEASE Left 2024    Procedure: RELEASE, CARPAL TUNNEL;  Surgeon: Jaime Oswald MD;  Location: [facility];  Service: Orthopedics;  Laterality: Left;    CATARACT EXTRACTION Bilateral     OU     SECTION  1979    CHOLECYSTECTOMY  2014    COLONOSCOPY N/A 10/02/2020    Procedure: COLONOSCOPY;  Surgeon: Tushar Edwards MD;  Location: [facility];  Service: Endoscopy;  Laterality: N/A;    COLONOSCOPY W/ POLYPECTOMY  10/02/2020    Polyps x3, repeat 5 years; Tushar Edwards MD     CYST REMOVAL  2015    sebaceous cyst removed from face    DE QUERVAIN'S RELEASE Left 2020    Procedure: RELEASE, HAND, FOR DEQUERVAIN'S TENOSYNOVITIS;  Surgeon: Jaime Oswald MD;  Location: [facility];  Service: Orthopedics;  Laterality: Left;    DE QUERVAIN'S RELEASE Right 2020    Procedure: RELEASE, HAND, FOR DEQUERVAIN'S TENOSYNOVITIS;  Surgeon: Jaime Oswald MD;  Location: [facility];  Service: Orthopedics;  Laterality: Right;    ENDOBRONCHIAL ULTRASOUND Bilateral 04/10/2024    Procedure: ENDOBRONCHIAL ULTRASOUND (EBUS);  Surgeon: Adrian Robin MD;  Location: [facility];  Service: Pulmonary;  Laterality: Bilateral;    ENDOBRONCHIAL ULTRASOUND Bilateral 1/15/2025    Procedure: ENDOBRONCHIAL ULTRASOUND (EBUS);  Surgeon:  Adrian Robin MD;  Location: [facility] END[facility];  Service: Pulmonary;  Laterality: Bilateral;    EYE SURGERY      gastric sleeve  02/13/2017    Dr. Watson    INJECTION OF ANESTHETIC AGENT AROUND MULTIPLE INTERCOSTAL NERVES  5/10/2024    Procedure: BLOCK, NERVE, INTERCOSTAL, 2 OR MORE;  Surgeon: Mike Nuñez MD;  Location: [facility] OR;  Service: Cardiothoracic;;    KNEE SURGERY Right     OLECRANON BURSECTOMY Right 07/25/2018    Procedure: BURSECTOMY, OLECRANON;  Surgeon: Jayro Pedraza Sr., MD;  Location: [facility] OR;  Service: Orthopedics;  Laterality: Right;    SURGICAL REMOVAL OF BUNION WITH OSTEOTOMY OF METATARSAL BONE Left 05/10/2019    Procedure: BUNIONECTOMY, WITH METATARSAL OSTEOTOMY;  Surgeon: Srinivasan Villanueva DPM;  Location: [facility] OR;  Service: Podiatry;  Laterality: Left;    SURGICAL REMOVAL OF BUNION WITH OSTEOTOMY OF METATARSAL BONE Right 06/28/2019    Procedure: BUNIONECTOMY, WITH METATARSAL OSTEOTOMY;  Surgeon: Srinivasan Villanueva DPM;  Location: [facility] OR;  Service: Podiatry;  Laterality: Right;    SURGICAL REMOVAL OF LYMPH NODE Left 5/10/2024    Procedure: EXCISION, LYMPH NODE;  Surgeon: Mike Nuñez MD;  Location: [facility] OR;  Service: Cardiothoracic;  Laterality: Left;    TONSILLECTOMY, ADENOIDECTOMY  1980s    TRANSESOPHAGEAL ECHOCARDIOGRAM WITH POSSIBLE CARDIOVERSION (LAKESHIA W/ POSS CARDIOVERSION) N/A 5/15/2024    Procedure: Transesophageal echo (LAKESHIA) intra-procedure log documentation;  Surgeon: Twan Pelaez MD;  Location: [facility] CATH LAB;  Service: Cardiology;  Laterality: N/A;    TRIGGER FINGER RELEASE Right 04/01/2015    Dr. Pedraza    XI ROBOTIC RATS,WITH LOBECTOMY,LUNG Left 5/10/2024    Procedure: XI ROBOTIC RATS,WITH LOBECTOMY,LUNG;  Surgeon: Mike Nuñez MD;  Location: [facility] OR;  Service: Cardiothoracic;  Laterality: Left;  Lingulectomy     Family History       Problem Relation (Age of Onset)    Diabetes Maternal Aunt, Cousin    Hypertension Maternal Grandmother    Prostate cancer Brother           Tobacco Use    Smoking status: Former     Current packs/day: 0.00     Average packs/day: 0.5 packs/day for 42.0 years (21.0 ttl pk-yrs)     Types: Cigarettes     Start date: 1970     Quit date: 2012     Years since quittin.2     Passive exposure: Past    Smokeless tobacco: Never   Substance and Sexual Activity    Alcohol use: Not Currently     Alcohol/week: 1.0 standard drink of alcohol     Types: 1 Glasses of wine per week     Comment: Glass red wine once a every 2 weeks    Drug use: Never    Sexual activity: Not Currently     Partners: Female     Birth control/protection: Abstinence, None       Review of Systems   Constitutional:  Positive for fatigue. Negative for activity change, appetite change, chills, diaphoresis, fever and unexpected weight change.   HENT:  Negative for congestion, dental problem, drooling, ear discharge, ear pain, facial swelling, hearing loss, mouth sores, nosebleeds, postnasal drip, rhinorrhea, sinus pressure, sneezing, sore throat, tinnitus, trouble swallowing and voice change.    Eyes:  Negative for photophobia, pain, discharge, redness, itching and visual disturbance.   Respiratory:  Negative for cough, choking, chest tightness, shortness of breath, wheezing and stridor.    Cardiovascular:  Negative for chest pain, palpitations and leg swelling.   Gastrointestinal:  Negative for abdominal distention, abdominal pain, anal bleeding, blood in stool, constipation, diarrhea, nausea, rectal pain and vomiting.   Endocrine: Negative for cold intolerance, heat intolerance, polydipsia, polyphagia and polyuria.   Genitourinary:  Negative for decreased urine volume, difficulty urinating, dyspareunia, dysuria, enuresis, flank pain, frequency, genital sores, hematuria, menstrual problem, pelvic pain, urgency, vaginal bleeding, vaginal discharge and vaginal pain.   Musculoskeletal:  Negative for arthralgias, back pain, gait problem, joint swelling, myalgias, neck pain and neck  stiffness.   Skin:  Negative for color change, pallor and rash.   Allergic/Immunologic: Negative for environmental allergies, food allergies and immunocompromised state.   Neurological:  Positive for weakness. Negative for dizziness, tremors, seizures, syncope, facial asymmetry, speech difficulty, light-headedness, numbness and headaches.   Hematological:  Negative for adenopathy. Does not bruise/bleed easily.   Psychiatric/Behavioral:  Negative for agitation, behavioral problems, confusion, decreased concentration, dysphoric mood, hallucinations, self-injury, sleep disturbance and suicidal ideas. The patient is not nervous/anxious and is not hyperactive.      Objective:     Vital Signs (Most Recent):  Temp: 99.5 °F (37.5 °C) (04/01/25 1202)  Pulse: 103 (04/02/25 0600)  Resp: 20 (04/02/25 0600)  BP: 123/66 (04/02/25 0600)  SpO2: (!) 93 % (04/02/25 0600) Vital Signs (24h Range):  Temp:  [98.5 °F (36.9 °C)-99.5 °F (37.5 °C)] 99.5 °F (37.5 °C)  Pulse:  [] 103  Resp:  [16-20] 20  SpO2:  [91 %-99 %] 93 %  BP: ()/(50-75) 123/66     Weight: 70.5 kg (155 lb 6.8 oz)  Body mass index is 38.9 kg/m².  Body surface area is 1.62 meters squared.      Intake/Output Summary (Last 24 hours) at 4/2/2025 0643  Last data filed at 4/1/2025 2114  Gross per 24 hour   Intake 99.52 ml   Output --   Net 99.52 ml        Physical Exam  Vitals reviewed.   Constitutional:       General: She is not in acute distress.     Appearance: She is well-developed. She is not diaphoretic.   HENT:      Head: Normocephalic and atraumatic.      Right Ear: External ear normal.      Left Ear: External ear normal.      Nose: Nose normal.      Right Sinus: No maxillary sinus tenderness or frontal sinus tenderness.      Left Sinus: No maxillary sinus tenderness or frontal sinus tenderness.      Mouth/Throat:      Pharynx: No oropharyngeal exudate.   Eyes:      General: Lids are normal. No scleral icterus.        Right eye: No discharge.         Left  eye: No discharge.      Conjunctiva/sclera: Conjunctivae normal.      Right eye: Right conjunctiva is not injected. No hemorrhage.     Left eye: Left conjunctiva is not injected. No hemorrhage.     Pupils: Pupils are equal, round, and reactive to light.   Neck:      Thyroid: No thyromegaly.      Vascular: No JVD.      Trachea: No tracheal deviation.   Cardiovascular:      Rate and Rhythm: Normal rate.   Pulmonary:      Effort: Pulmonary effort is normal. No respiratory distress.      Breath sounds: No stridor.   Chest:      Chest wall: No tenderness.   Abdominal:      General: Bowel sounds are normal. There is no distension.      Palpations: Abdomen is soft. There is no hepatomegaly, splenomegaly or mass.      Tenderness: There is no abdominal tenderness. There is no rebound.   Musculoskeletal:         General: No tenderness. Normal range of motion.      Cervical back: Normal range of motion and neck supple.   Lymphadenopathy:      Cervical: No cervical adenopathy.      Upper Body:      Right upper body: No supraclavicular adenopathy.      Left upper body: No supraclavicular adenopathy.   Skin:     General: Skin is dry.      Findings: No erythema or rash.   Neurological:      Mental Status: She is alert and oriented to person, place, and time.      Cranial Nerves: No cranial nerve deficit.      Coordination: Coordination normal.   Psychiatric:         Behavior: Behavior normal.         Thought Content: Thought content normal.         Judgment: Judgment normal.          Significant Labs:   BMP:   Recent Labs   Lab 04/01/25 1249 04/02/25  0435   * 134*   K 4.2 3.7    105   CO2 21* 21*   BUN 13 7*   CREATININE 1.0 0.5   CALCIUM 9.1 9.0   MG 1.6  --    , CBC:   Recent Labs   Lab 04/01/25 1249 04/02/25  0435   WBC 2.10* 2.70*   HGB 12.3 11.9*   HCT 36.6* 34.9*   * 83*   , CMP:   Recent Labs   Lab 04/01/25 1249 04/02/25  0435   * 134*   K 4.2 3.7    105   CO2 21* 21*   BUN 13 7*  "  CREATININE 1.0 0.5   CALCIUM 9.1 9.0   ALBUMIN 3.1* 2.7*   BILITOT 0.9 0.7   ALKPHOS 115 101   * 59*   * 103*   ANIONGAP 12 8   , Coagulation: No results for input(s): "PT", "INR", "APTT" in the last 48 hours., Haptoglobin: No results for input(s): "HAPTOGLOBIN" in the last 48 hours., Immunology: No results for input(s): "SPEP", "LORNA", "DEBORAH", "FREELAMBDALI" in the last 48 hours., LDH: No results for input(s): "LDHCSF", "BFSOURCE" in the last 48 hours., LFTs:   Recent Labs   Lab 04/01/25  1249 04/02/25  0435   * 103*   * 59*   ALKPHOS 115 101   BILITOT 0.9 0.7   ALBUMIN 3.1* 2.7*   , Reticulocytes: No results for input(s): "RETIC" in the last 48 hours., Tumor Markers: No results for input(s): "PSA", "CEA", "", "AFPTM", "[id number]", "" in the last 48 hours.    Invalid input(s): "ALGTM", Uric Acid No results for input(s): "URICACID" in the last 48 hours., and Urine Studies:   Recent Labs   Lab 04/01/25  1539   COLORU Yellow   APPEARANCEUA Clear   PHUR 6.0   SPECGRAV 1.015   PROTEINUA Trace*   GLUCUA Negative   BILIRUBINUA Negative   OCCULTUA Trace*   NITRITE Negative   UROBILINOGEN 2.0-3.0*   LEUKOCYTESUR Negative       Diagnostic Results:  I have reviewed all pertinent imaging results/findings within the past 24 hours.  "

## 2025-04-02 NOTE — ASSESSMENT & PLAN NOTE
Patient is admitted after being seen by radiation oncology and found to be hypotensive.  Agree with evaluation in emergency room for potential sepsis

## 2025-04-02 NOTE — PROGRESS NOTES
[facility] Medicine  Progress Note    Patient Name: Cassandra Campos  MRN: [id number]  Patient Class: IP- Inpatient   Admission Date: 4/1/2025  Length of Stay: 1 days  Attending Physician: Win Pace MD  Primary Care Provider: Emil Zhang MD        Subjective     Principal Problem:Sepsis        HPI:  Cassandra Campos is a 74 y.o. female with a PMH  has a past medical history of Arthritis, Bilateral bunions, Borderline glaucoma, De Quervain's disease (radial styloid tenosynovitis), Gastritis, Hydradenitis, Hyperlipidemia, Hypertension, Insomnia, Lung cancer, Migraines (02/01/2000), Nasal septum perforation, Obesity, Pneumonia, Restrictive airway disease, Sleep apnea, SVT (supraventricular tachycardia) (09/2013), Trigger finger, and Type 2 diabetes mellitus (2012).  Presented to the ER for evaluation for low blood pressure.  Patient was sent over from Dr. Zhang's office where [facility], but her session was held due to her low BP reading in the office.  Patient reports she felt more fatigued with shortness of breath mild chest pain, productive cough, and lightheadedness/dizziness which started yesterday.  Denied any specific alleviating or aggravating factors.  Denies any fevers, aches, chills, sweats, nausea, vomiting, diarrhea, abdominal pain, or any other symptoms at this time.    ER workup revealed CBC to be at baseline.  CMP revealed CBG of 154 mg/dL AST/ALT of 177/163.  Troponin 0.276.  Procalcitonin level 6.41.  Lactic acid level 5.1 with repeat 2.3.  Flu/COVID negative.  UA unremarkable.  Blood cultures pending.  Chest x-ray showed no acute infiltrates.  CT chest abdomen and pelvis with IV contrast revealed multifocal ground-glass opacities to left lung most prominent in the lower lobe infectious versus inflammatory process.  EKG revealed sinus tachycardia with a ventricular rate of 110 beats per minute QT/QTC of  342/462.  Patient received 2 g of cefepime and 1,182 cc normal saline in the ER.  Hospital Medicine consulted to admit patient for sepsis likely secondary to multifocal pneumonia.  Patient in agreement with treatment plan.  Patient admitted under inpatient status.    PCP: Emil Zhang    Overview/Hospital Course:  No notes on file    Interval History: f/u hypotension, PNA patient reports she is feeling better, hopeful to go home tomorrow.     Review of Systems  Objective:     Vital Signs (Most Recent):  Temp: 99.3 °F (37.4 °C) (04/02/25 1227)  Pulse: 100 (04/02/25 1518)  Resp: 18 (04/02/25 1227)  BP: 98/61 (04/02/25 1227)  SpO2: 95 % (04/02/25 1227) Vital Signs (24h Range):  Temp:  [99.3 °F (37.4 °C)] 99.3 °F (37.4 °C)  Pulse:  [] 100  Resp:  [16-20] 18  SpO2:  [91 %-95 %] 95 %  BP: ()/(55-77) 98/61     Weight: 70.5 kg (155 lb 6.8 oz)  Body mass index is 38.9 kg/m².    Intake/Output Summary (Last 24 hours) at 4/2/2025 1640  Last data filed at 4/2/2025 1018  Gross per 24 hour   Intake 349.52 ml   Output 2 ml   Net 347.52 ml         Physical Exam  HENT:      Head: Normocephalic and atraumatic.   Cardiovascular:      Rate and Rhythm: Normal rate and regular rhythm.      Heart sounds: No murmur heard.  Pulmonary:      Effort: Pulmonary effort is normal. No respiratory distress.      Breath sounds: Normal breath sounds. No wheezing.   Abdominal:      General: Bowel sounds are normal. There is no distension.      Palpations: Abdomen is soft.      Tenderness: There is no abdominal tenderness.   Musculoskeletal:         General: No swelling.   Skin:     General: Skin is warm and dry.   Neurological:      Mental Status: She is alert and oriented to person, place, and time. Mental status is at baseline.               Significant Labs: All pertinent labs within the past 24 hours have been reviewed.  Recent Lab Results         04/02/25  1230   04/02/25  0435   04/01/25  2016   04/01/25  1804        Albumin    2.7           ALP   101           ALT   103           Anion Gap   8           AST   59           Baso #   0.02           Basophil %   0.7           BILIRUBIN TOTAL   0.7  Comment: For infants and newborns, interpretation of results should be based   on gestational age, weight and in agreement with clinical   observations.    Premature Infant recommended reference ranges:   0-24 hours:  <8.0 mg/dL   24-48 hours: <12.0 mg/dL   3-5 days:    <15.0 mg/dL   6-29 days:   <15.0 mg/dL           BUN   7           Calcium   9.0           Chloride   105           CO2   21           Creatinine   0.5           eGFR   >60  Comment: Estimated GFR calculated using the CKD-EPI creatinine (2021) equation.           Eos #   0.00           Eos %   0.0           Glucose   106           Gran # (ANC)   2.21           Hematocrit   34.9           Hemoglobin   11.9           Immature Grans (Abs)   0.03  Comment: Mild elevation in immature granulocytes is non specific and can be seen in a variety of conditions including stress response, acute inflammation, trauma and pregnancy. Correlation with other laboratory and clinical findings is essential.           Immature Granulocytes   1.1           Lactic Acid Level       2.3       Lymph #   0.35           Lymph %   13.0           MCH   26.1           MCHC   34.1           MCV   77           Mono #   0.09           Mono %   3.3           MPV   8.7           Neut %   81.9           nRBC   0           Platelet Estimate   Decreased           Platelet Count   83           POCT Glucose 168     127         Potassium   3.7           PROTEIN TOTAL   7.2           RBC   4.56           RDW   19.2           Sodium   134           WBC   2.70                   Significant Imaging: I have reviewed all pertinent imaging results/findings within the past 24 hours.      Assessment & Plan  Sepsis  This patient does have evidence of infective focus  My overall impression is sepsis.  Source: Respiratory  Antibiotics  given-   Antibiotics (72h ago, onward)      Start     Stop Route Frequency Ordered    04/02/25 1300  ceFEPIme injection 2 g         -- IV Every 8 hours (non-standard times) 04/02/25 1212    04/01/25 2000  doxycycline 100 mg in D5W 100 mL IVPB (MB+)         -- IV Every 12 hours (non-standard times) 04/01/25 1958          Latest lactate reviewed-  Recent Labs   Lab 04/01/25  1804   LACTATE 2.3*       Fluid challenge Ideal Body Weight- The patient's ideal body weight is Ideal body weight: 39.4 kg (86 lb 12.3 oz) which will be used to calculate fluid bolus of 30 ml/kg for treatment of septic shock.      Post- resuscitation assessment No - Post resuscitation assessment not needed       Will Not start Pressors- Levophed for MAP of 65  Source control achieved by: IVFs, Abx,  Multifocal pneumonia  Patient has a diagnosis of pneumonia. The cause of the pneumonia is unknown at this time. The pneumonia is stable. The patient has the following signs/symptoms of pneumonia: cough, shortness of breath, and chest pain. The patient does not have a current oxygen requirement and the patient does not have a home oxygen requirement. I have reviewed the pertinent imaging. The following cultures have been collected: Blood cultures and Sputum culture The culture results are listed below.     Current antimicrobial regimen consists of the antibiotics listed below. Will monitor patient closely and continue current treatment plan unchanged.    Antibiotics (From admission, onward)      Start     Stop Route Frequency Ordered    04/02/25 1300  ceFEPIme injection 2 g         -- IV Every 8 hours (non-standard times) 04/02/25 1212    04/01/25 2000  doxycycline 100 mg in D5W 100 mL IVPB (MB+)         -- IV Every 12 hours (non-standard times) 04/01/25 1958            Microbiology Results (last 7 days)       Procedure Component Value Units Date/Time    Blood culture x two cultures. Draw prior to antibiotics. [[id number]]  (Normal) Collected: 04/01/25  1249    Order Status: Completed Specimen: Blood from Peripheral, Antecubital, Right Updated: 04/02/25 1001     Blood Culture No Growth After 6 Hours    Blood culture x two cultures. Draw prior to antibiotics. [[id number]]  (Normal) Collected: 04/01/25 1252    Order Status: Completed Specimen: Blood from Peripheral, Forearm, Right Updated: 04/02/25 1001     Blood Culture No Growth After 6 Hours    Influenza A & B by Molecular [[id number]]  (Normal) Collected: 04/01/25 1229    Order Status: Completed Specimen: Nasopharyngeal Swab Updated: 04/01/25 1301     INFLUENZA A MOLECULAR Negative     INFLUENZA B MOLECULAR  Negative          Hypotension    -continue IVFs  -hold bp meds until bp stabilizes      Controlled type 2 diabetes mellitus without complication, without long-term current use of insulin  Patient's FSGs are controlled on current medication regimen.  Last A1c reviewed-   Lab Results   Component Value Date    HGBA1C 5.7 (H) 08/28/2024     Most recent fingerstick glucose reviewed-   Recent Labs   Lab 04/01/25 2016 04/02/25  1230   POCTGLUCOSE 127* 168*     Current correctional scale  Low  Maintain anti-hyperglycemic dose as follows-   Antihyperglycemics (From admission, onward)      Start     Stop Route Frequency Ordered    04/01/25 2100  insulin aspart U-100 pen 0-5 Units         -- SubQ Before meals & nightly PRN 04/01/25 2001          Not on oral medication        Primary hypertension  Chronic, controlled.  Latest blood pressure and vitals reviewed-   Temp:  [99.3 °F (37.4 °C)]   Pulse:  []   Resp:  [16-20]   BP: ()/(55-77)   SpO2:  [91 %-95 %] .   Home meds for hypertension were reviewed and noted below.   Hypertension Medications              losartan (COZAAR) 25 MG tablet Take 1 tablet (25 mg total) by mouth once daily.    metoprolol succinate (TOPROL-XL) 50 MG 24 hr tablet Take 1 tablet (50 mg total) by mouth once daily.            While in the hospital, will manage blood pressure as follows;  Adjust home antihypertensive regimen as follows- hold bp meds due to hypotension, resume once bp stable    Will utilize p.r.n. blood pressure medication only if patient's blood pressure greater than  160/90 and she develops symptoms such as worsening chest pain or shortness of breath.    Malignant neoplasm of lower lobe of left lung  Followed by Dr. Moran.   Paroxysmal SVT (supraventricular tachycardia)  -tele monitoring  -continue metoprolol    GERD (gastroesophageal reflux disease)  -Continue PPI/Antacids as needed       Hyperlipidemia associated with type 2 diabetes mellitus  Patient is not chronically on statin.will not continue for now. Last Lipid Panel:   Lab Results   Component Value Date    CHOL 184 12/26/2023    HDL 48 12/26/2023    LDLCALC 110.4 12/26/2023    TRIG 128 12/26/2023    CHOLHDL 26.1 12/26/2023     Plan:  -low fat/low calorie diet      Insomnia  -continue amitriptyline and Lunesta    Major depressive disorder, single episode, mild  Chronic. Stable. Not in acute exacerbation and currently denies endorsing any suicidal/homicidal ideations.   Plan:  -Continue home medications (amitriptyline)    Anxiety    -Continue home medications (ativan)      VTE Risk Mitigation (From admission, onward)           Ordered     enoxaparin injection 40 mg  Daily         04/01/25 2001     IP VTE HIGH RISK PATIENT  Once         04/01/25 2001     Place sequential compression device  Until discontinued         04/01/25 2001                    Discharge Planning   OLMAN:      Code Status: Full Code   Medical Readiness for Discharge Date:   Discharge Plan A: Home                        Win Pace MD  Department of Hospital Medicine   [facility]

## 2025-04-02 NOTE — ASSESSMENT & PLAN NOTE
Followed by Dr. Moran. Last evaluated by on 3/28/25.  Per clinic noted from visit:  - Returns for final dose of concurrent carboplatin Taxol external beam radiation therapy patient is tolerating therapy well denies nausea chills sweats status.   - Return to clinic in 1 month CBC CMP TSH will discuss use of maintenance immunotherapy at that point.   Plan:  - Hem/Onc consult

## 2025-04-02 NOTE — PLAN OF CARE
Discussed poc with pt, pt verbalized understanding    Purposeful rounding every 2hours    VS wnl  Cardiac monitoring in use, pt is NSR, tele monitor # [id number]  Blood glucose monitoring   Fall precautions in place, remains injury free  Pt denies c/o pain    Accurate I&Os  Abx given as prescribed  Bed locked at lowest position  Call light within reach    Chart check complete  Will cont with POC

## 2025-04-02 NOTE — SUBJECTIVE & OBJECTIVE
Oncology Treatment Plan:   OP NSCLC PACLitaxel CARBOplatin QW + radiation    Medications:  Continuous Infusions:  Scheduled Meds:   amitriptyline  50 mg Oral QHS    ceFEPime IV (PEDS and ADULTS)  2 g Intravenous Q12H    doxycycline IV (PEDS and ADULTS)  100 mg Intravenous Q12H    enoxparin  40 mg Subcutaneous Daily    LORazepam  1 mg Oral BID    metoprolol succinate  50 mg Oral Daily     PRN Meds:  Current Facility-Administered Medications:     aluminum-magnesium hydroxide-simethicone, 30 mL, Oral, QID PRN    dextrose 50%, 12.5 g, Intravenous, PRN    dextrose 50%, 25 g, Intravenous, PRN    glucagon (human recombinant), 1 mg, Intramuscular, PRN    glucose, 16 g, Oral, PRN    glucose, 24 g, Oral, PRN    insulin aspart U-100, 0-5 Units, Subcutaneous, QID (AC + HS) PRN    naloxone, 0.02 mg, Intravenous, PRN    ondansetron, 4 mg, Intravenous, Q8H PRN    oxyCODONE-acetaminophen, 1 tablet, Oral, Q4H PRN    polyethylene glycol, 17 g, Oral, Daily PRN    promethazine, 25 mg, Oral, Q6H PRN    simethicone, 1 tablet, Oral, QID PRN    sodium chloride 0.9%, 10 mL, Intravenous, PRN    sodium chloride 0.9%, 10 mL, Intravenous, PRN    sodium chloride 0.9%, 3 mL, Intravenous, Q12H PRN    zolpidem, 5 mg, Oral, Nightly PRN     Review of patient's allergies indicates:  No Known Allergies     Past Medical History:   Diagnosis Date    Arthritis     hands    Bilateral bunions     Borderline glaucoma     De Quervain's disease (radial styloid tenosynovitis)     Gastritis     upper GI 2/2017    Hydradenitis     Hyperlipidemia     Hypertension     Insomnia     Lung cancer     Migraines 02/01/2000    Nasal septum perforation     Obesity     Pneumonia     Restrictive airway disease     Sleep apnea     SVT (supraventricular tachycardia) 09/2013    Trigger finger     Type 2 diabetes mellitus 2012     am 02/01/2024     Past Surgical History:   Procedure Laterality Date    AXILLARY HIDRADENITIS EXCISION Bilateral     BONE EXOSTOSIS EXCISION  Right 2018    Procedure: EXCISION, EXOSTOSIS;  Surgeon: Jayro Pedraza Sr., MD;  Location: [facility];  Service: Orthopedics;  Laterality: Right;    BREAST BIOPSY Bilateral     both benign    BREAST SURGERY  1998    BRONCHOSCOPY Bilateral 1/15/2025    Procedure: Bronchoscopy - insert lighted tube into airway to take a biopsy of lung;  Surgeon: Adrian Robin MD;  Location: [facility];  Service: Pulmonary;  Laterality: Bilateral;    CARPAL TUNNEL RELEASE      bilateral    CARPAL TUNNEL RELEASE Right 2024    Procedure: RELEASE, CARPAL TUNNEL;  Surgeon: Jaime Oswald MD;  Location: [facility];  Service: Orthopedics;  Laterality: Right;    CARPAL TUNNEL RELEASE Left 2024    Procedure: RELEASE, CARPAL TUNNEL;  Surgeon: Jaime Oswald MD;  Location: [facility];  Service: Orthopedics;  Laterality: Left;    CATARACT EXTRACTION Bilateral     OU     SECTION  1979    CHOLECYSTECTOMY  2014    COLONOSCOPY N/A 10/02/2020    Procedure: COLONOSCOPY;  Surgeon: Tushar Edwards MD;  Location: [facility];  Service: Endoscopy;  Laterality: N/A;    COLONOSCOPY W/ POLYPECTOMY  10/02/2020    Polyps x3, repeat 5 years; Tushar Edwards MD     CYST REMOVAL  2015    sebaceous cyst removed from face    DE QUERVAIN'S RELEASE Left 2020    Procedure: RELEASE, HAND, FOR DEQUERVAIN'S TENOSYNOVITIS;  Surgeon: Jaime Oswald MD;  Location: [facility];  Service: Orthopedics;  Laterality: Left;    DE QUERVAIN'S RELEASE Right 2020    Procedure: RELEASE, HAND, FOR DEQUERVAIN'S TENOSYNOVITIS;  Surgeon: Jaime Oswald MD;  Location: [facility];  Service: Orthopedics;  Laterality: Right;    ENDOBRONCHIAL ULTRASOUND Bilateral 04/10/2024    Procedure: ENDOBRONCHIAL ULTRASOUND (EBUS);  Surgeon: Adrian Robin MD;  Location: [facility];  Service: Pulmonary;  Laterality: Bilateral;    ENDOBRONCHIAL ULTRASOUND Bilateral 1/15/2025    Procedure: ENDOBRONCHIAL ULTRASOUND (EBUS);  Surgeon:  Adrian Robin MD;  Location: [facility];  Service: Pulmonary;  Laterality: Bilateral;    EYE SURGERY      gastric sleeve  02/13/2017    Dr. Watson    INJECTION OF ANESTHETIC AGENT AROUND MULTIPLE INTERCOSTAL NERVES  5/10/2024    Procedure: BLOCK, NERVE, INTERCOSTAL, 2 OR MORE;  Surgeon: Mike Nuñez MD;  Location: [facility] OR;  Service: Cardiothoracic;;    KNEE SURGERY Right     OLECRANON BURSECTOMY Right 07/25/2018    Procedure: BURSECTOMY, OLECRANON;  Surgeon: Jayro Pedraza Sr., MD;  Location: [facility] OR;  Service: Orthopedics;  Laterality: Right;    SURGICAL REMOVAL OF BUNION WITH OSTEOTOMY OF METATARSAL BONE Left 05/10/2019    Procedure: BUNIONECTOMY, WITH METATARSAL OSTEOTOMY;  Surgeon: Srinivasan Villanueva DPM;  Location: [facility] OR;  Service: Podiatry;  Laterality: Left;    SURGICAL REMOVAL OF BUNION WITH OSTEOTOMY OF METATARSAL BONE Right 06/28/2019    Procedure: BUNIONECTOMY, WITH METATARSAL OSTEOTOMY;  Surgeon: Srinivasan Villanueva DPM;  Location: [facility] OR;  Service: Podiatry;  Laterality: Right;    SURGICAL REMOVAL OF LYMPH NODE Left 5/10/2024    Procedure: EXCISION, LYMPH NODE;  Surgeon: iMke Nuñez MD;  Location: [facility] OR;  Service: Cardiothoracic;  Laterality: Left;    TONSILLECTOMY, ADENOIDECTOMY  1980s    TRANSESOPHAGEAL ECHOCARDIOGRAM WITH POSSIBLE CARDIOVERSION (LAKESHIA W/ POSS CARDIOVERSION) N/A 5/15/2024    Procedure: Transesophageal echo (LAKESHIA) intra-procedure log documentation;  Surgeon: Twan Pelaez MD;  Location: [facility] CATH LAB;  Service: Cardiology;  Laterality: N/A;    TRIGGER FINGER RELEASE Right 04/01/2015    Dr. Pedraza    XI ROBOTIC RATS,WITH LOBECTOMY,LUNG Left 5/10/2024    Procedure: XI ROBOTIC RATS,WITH LOBECTOMY,LUNG;  Surgeon: Mike Nuñez MD;  Location: [facility] OR;  Service: Cardiothoracic;  Laterality: Left;  Lingulectomy     Family History       Problem Relation (Age of Onset)    Diabetes Maternal Aunt, Cousin    Hypertension Maternal Grandmother    Prostate cancer Brother           Tobacco Use    Smoking status: Former     Current packs/day: 0.00     Average packs/day: 0.5 packs/day for 42.0 years (21.0 ttl pk-yrs)     Types: Cigarettes     Start date: 1970     Quit date: 2012     Years since quittin.2     Passive exposure: Past    Smokeless tobacco: Never   Substance and Sexual Activity    Alcohol use: Not Currently     Alcohol/week: 1.0 standard drink of alcohol     Types: 1 Glasses of wine per week     Comment: Glass red wine once a every 2 weeks    Drug use: Never    Sexual activity: Not Currently     Partners: Female     Birth control/protection: Abstinence, None       Review of Systems   Constitutional:  Positive for fatigue. Negative for activity change, appetite change, chills, diaphoresis, fever and unexpected weight change.   HENT:  Negative for congestion, dental problem, drooling, ear discharge, ear pain, facial swelling, hearing loss, mouth sores, nosebleeds, postnasal drip, rhinorrhea, sinus pressure, sneezing, sore throat, tinnitus, trouble swallowing and voice change.    Eyes:  Negative for photophobia, pain, discharge, redness, itching and visual disturbance.   Respiratory:  Positive for cough and shortness of breath. Negative for choking, chest tightness, wheezing and stridor.    Cardiovascular:  Negative for chest pain, palpitations and leg swelling.   Gastrointestinal:  Negative for abdominal distention, abdominal pain, anal bleeding, blood in stool, constipation, diarrhea, nausea, rectal pain and vomiting.   Endocrine: Negative for cold intolerance, heat intolerance, polydipsia, polyphagia and polyuria.   Genitourinary:  Negative for decreased urine volume, difficulty urinating, dyspareunia, dysuria, enuresis, flank pain, frequency, genital sores, hematuria, menstrual problem, pelvic pain, urgency, vaginal bleeding, vaginal discharge and vaginal pain.   Musculoskeletal:  Positive for gait problem. Negative for arthralgias, back pain, joint swelling,  myalgias, neck pain and neck stiffness.   Skin:  Negative for color change, pallor and rash.   Allergic/Immunologic: Negative for environmental allergies, food allergies and immunocompromised state.   Neurological:  Positive for weakness. Negative for dizziness, tremors, seizures, syncope, facial asymmetry, speech difficulty, light-headedness, numbness and headaches.   Hematological:  Negative for adenopathy. Does not bruise/bleed easily.   Psychiatric/Behavioral:  Negative for agitation, behavioral problems, confusion, decreased concentration, dysphoric mood, hallucinations, self-injury, sleep disturbance and suicidal ideas. The patient is not nervous/anxious and is not hyperactive.      Objective:     Vital Signs (Most Recent):  Temp: 99.5 °F (37.5 °C) (04/01/25 1202)  Pulse: 103 (04/02/25 0600)  Resp: 20 (04/02/25 0600)  BP: 123/66 (04/02/25 0600)  SpO2: (!) 93 % (04/02/25 0600) Vital Signs (24h Range):  Temp:  [98.5 °F (36.9 °C)-99.5 °F (37.5 °C)] 99.5 °F (37.5 °C)  Pulse:  [] 103  Resp:  [16-20] 20  SpO2:  [91 %-99 %] 93 %  BP: ()/(50-75) 123/66     Weight: 70.5 kg (155 lb 6.8 oz)  Body mass index is 38.9 kg/m².  Body surface area is 1.62 meters squared.      Intake/Output Summary (Last 24 hours) at 4/2/2025 0641  Last data filed at 4/1/2025 2114  Gross per 24 hour   Intake 99.52 ml   Output --   Net 99.52 ml        Physical Exam  Vitals reviewed.   Constitutional:       General: She is in acute distress.      Appearance: She is well-developed. She is ill-appearing. She is not diaphoretic.   HENT:      Head: Normocephalic and atraumatic.      Right Ear: External ear normal.      Left Ear: External ear normal.      Nose: Nose normal.      Right Sinus: No maxillary sinus tenderness or frontal sinus tenderness.      Left Sinus: No maxillary sinus tenderness or frontal sinus tenderness.      Mouth/Throat:      Pharynx: No oropharyngeal exudate.   Eyes:      General: Lids are normal. No scleral icterus.         Right eye: No discharge.         Left eye: No discharge.      Conjunctiva/sclera: Conjunctivae normal.      Right eye: Right conjunctiva is not injected. No hemorrhage.     Left eye: Left conjunctiva is not injected. No hemorrhage.     Pupils: Pupils are equal, round, and reactive to light.   Neck:      Thyroid: No thyromegaly.      Vascular: No JVD.      Trachea: No tracheal deviation.   Cardiovascular:      Rate and Rhythm: Normal rate.   Pulmonary:      Effort: Pulmonary effort is normal. No respiratory distress.      Breath sounds: No stridor.   Chest:      Chest wall: No tenderness.   Abdominal:      General: Bowel sounds are normal. There is no distension.      Palpations: Abdomen is soft. There is no hepatomegaly, splenomegaly or mass.      Tenderness: There is no abdominal tenderness. There is no rebound.   Musculoskeletal:         General: No tenderness. Normal range of motion.      Cervical back: Normal range of motion and neck supple.   Lymphadenopathy:      Cervical: No cervical adenopathy.      Upper Body:      Right upper body: No supraclavicular adenopathy.      Left upper body: No supraclavicular adenopathy.   Skin:     General: Skin is dry.      Findings: No erythema or rash.   Neurological:      Mental Status: She is alert and oriented to person, place, and time.      Cranial Nerves: No cranial nerve deficit.      Motor: Weakness present.      Coordination: Coordination abnormal.      Gait: Gait abnormal.   Psychiatric:         Behavior: Behavior normal.         Thought Content: Thought content normal.         Judgment: Judgment normal.          Significant Labs:   CBC:   Recent Labs   Lab 04/01/25  1249 04/02/25  0435   WBC 2.10* 2.70*   HGB 12.3 11.9*   HCT 36.6* 34.9*   * 83*   , CMP:   Recent Labs   Lab 04/01/25  1249 04/02/25  0435   * 134*   K 4.2 3.7    105   CO2 21* 21*   BUN 13 7*   CREATININE 1.0 0.5   CALCIUM 9.1 9.0   ALBUMIN 3.1* 2.7*   BILITOT 0.9 0.7  "  ALKPHOS 115 101   * 59*   * 103*   ANIONGAP 12 8   , Coagulation: No results for input(s): "PT", "INR", "APTT" in the last 48 hours., Haptoglobin: No results for input(s): "HAPTOGLOBIN" in the last 48 hours., Immunology: No results for input(s): "SPEP", "LORNA", "DEBORAH", "FREELAMBDALI" in the last 48 hours., LDH: No results for input(s): "LDHCSF", "BFSOURCE" in the last 48 hours., LFTs:   Recent Labs   Lab 04/01/25  1249 04/02/25  0435   * 103*   * 59*   ALKPHOS 115 101   BILITOT 0.9 0.7   ALBUMIN 3.1* 2.7*   , Reticulocytes: No results for input(s): "RETIC" in the last 48 hours., Tumor Markers: No results for input(s): "PSA", "CEA", "", "AFPTM", "[id number]", "" in the last 48 hours.    Invalid input(s): "ALGTM", Uric Acid No results for input(s): "URICACID" in the last 48 hours., and Urine Studies:   Recent Labs   Lab 04/01/25  1539   COLORU Yellow   APPEARANCEUA Clear   PHUR 6.0   SPECGRAV 1.015   PROTEINUA Trace*   GLUCUA Negative   BILIRUBINUA Negative   OCCULTUA Trace*   NITRITE Negative   UROBILINOGEN 2.0-3.0*   LEUKOCYTESUR Negative       Diagnostic Results:  I have reviewed all pertinent imaging results/findings within the past 24 hours.  "

## 2025-04-02 NOTE — ASSESSMENT & PLAN NOTE
Chronic. Stable. Not in acute exacerbation and currently denies endorsing any suicidal/homicidal ideations.   Plan:  -Continue home medications (amitriptyline)

## 2025-04-02 NOTE — SUBJECTIVE & OBJECTIVE
Interval History: f/u hypotension, PNA patient reports she is feeling better, hopeful to go home tomorrow.     Review of Systems  Objective:     Vital Signs (Most Recent):  Temp: 99.3 °F (37.4 °C) (04/02/25 1227)  Pulse: 100 (04/02/25 1518)  Resp: 18 (04/02/25 1227)  BP: 98/61 (04/02/25 1227)  SpO2: 95 % (04/02/25 1227) Vital Signs (24h Range):  Temp:  [99.3 °F (37.4 °C)] 99.3 °F (37.4 °C)  Pulse:  [] 100  Resp:  [16-20] 18  SpO2:  [91 %-95 %] 95 %  BP: ()/(55-77) 98/61     Weight: 70.5 kg (155 lb 6.8 oz)  Body mass index is 38.9 kg/m².    Intake/Output Summary (Last 24 hours) at 4/2/2025 1640  Last data filed at 4/2/2025 1018  Gross per 24 hour   Intake 349.52 ml   Output 2 ml   Net 347.52 ml         Physical Exam  HENT:      Head: Normocephalic and atraumatic.   Cardiovascular:      Rate and Rhythm: Normal rate and regular rhythm.      Heart sounds: No murmur heard.  Pulmonary:      Effort: Pulmonary effort is normal. No respiratory distress.      Breath sounds: Normal breath sounds. No wheezing.   Abdominal:      General: Bowel sounds are normal. There is no distension.      Palpations: Abdomen is soft.      Tenderness: There is no abdominal tenderness.   Musculoskeletal:         General: No swelling.   Skin:     General: Skin is warm and dry.   Neurological:      Mental Status: She is alert and oriented to person, place, and time. Mental status is at baseline.               Significant Labs: All pertinent labs within the past 24 hours have been reviewed.  Recent Lab Results         04/02/25  1230   04/02/25  0435   04/01/25  2016   04/01/25  1804        Albumin   2.7           ALP   101           ALT   103           Anion Gap   8           AST   59           Baso #   0.02           Basophil %   0.7           BILIRUBIN TOTAL   0.7  Comment: For infants and newborns, interpretation of results should be based   on gestational age, weight and in agreement with clinical   observations.    Premature Infant  recommended reference ranges:   0-24 hours:  <8.0 mg/dL   24-48 hours: <12.0 mg/dL   3-5 days:    <15.0 mg/dL   6-29 days:   <15.0 mg/dL           BUN   7           Calcium   9.0           Chloride   105           CO2   21           Creatinine   0.5           eGFR   >60  Comment: Estimated GFR calculated using the CKD-EPI creatinine (2021) equation.           Eos #   0.00           Eos %   0.0           Glucose   106           Gran # (ANC)   2.21           Hematocrit   34.9           Hemoglobin   11.9           Immature Grans (Abs)   0.03  Comment: Mild elevation in immature granulocytes is non specific and can be seen in a variety of conditions including stress response, acute inflammation, trauma and pregnancy. Correlation with other laboratory and clinical findings is essential.           Immature Granulocytes   1.1           Lactic Acid Level       2.3       Lymph #   0.35           Lymph %   13.0           MCH   26.1           MCHC   34.1           MCV   77           Mono #   0.09           Mono %   3.3           MPV   8.7           Neut %   81.9           nRBC   0           Platelet Estimate   Decreased           Platelet Count   83           POCT Glucose 168     127         Potassium   3.7           PROTEIN TOTAL   7.2           RBC   4.56           RDW   19.2           Sodium   134           WBC   2.70                   Significant Imaging: I have reviewed all pertinent imaging results/findings within the past 24 hours.

## 2025-04-02 NOTE — ASSESSMENT & PLAN NOTE
Patient's FSGs are controlled on current medication regimen.  Last A1c reviewed-   Lab Results   Component Value Date    HGBA1C 5.7 (H) 08/28/2024     Most recent fingerstick glucose reviewed-   Recent Labs   Lab 04/01/25  2016   POCTGLUCOSE 127*     Current correctional scale  Low  Maintain anti-hyperglycemic dose as follows-   Antihyperglycemics (From admission, onward)      Start     Stop Route Frequency Ordered    04/01/25 2100  insulin aspart U-100 pen 0-5 Units         -- SubQ Before meals & nightly PRN 04/01/25 2001          Plan:  -SSI  -A1c  -Accu-checks  -Hold oral hypoglycemics while patient is in the hospital  -Continue home long-acting insulin at 25-50% decrease, titrate up as needed  -Hypoglycemic protocol

## 2025-04-02 NOTE — CONSULTS
[facility] - Emergency Dept.  Hematology/Oncology  Consult Note    Patient Name: Cassandra Campos  MRN: [id number]  Admission Date: 4/1/2025  Hospital Length of Stay: 1 days  Code Status: Full Code   Attending Provider: No att. providers found  Consulting Provider: Emerson Moran MD  Primary Care Physician: Emil Zhang MD  Principal Problem:Sepsis    Consults  Subjective:     HPI:  74-year-old female history of stage III non-small cell lung carcinoma receiving concurrent carboplatin and Taxol.  Patient completed her last dose of Carbo platinum and Taxol last week.  Presented for final 2 doses of radiation therapy.  The patient had extreme weakness hypotension was seen by radiation oncology in recommended evaluation in the emergency room.  Was asked to see the patient for evaluation in treatment recommendations    Oncology Treatment Plan:   OP NSCLC PACLitaxel CARBOplatin QW + radiation    Medications:  Continuous Infusions:  Scheduled Meds:   amitriptyline  50 mg Oral QHS    ceFEPime IV (PEDS and ADULTS)  2 g Intravenous Q12H    doxycycline IV (PEDS and ADULTS)  100 mg Intravenous Q12H    enoxparin  40 mg Subcutaneous Daily    LORazepam  1 mg Oral BID    metoprolol succinate  50 mg Oral Daily     PRN Meds:  Current Facility-Administered Medications:     aluminum-magnesium hydroxide-simethicone, 30 mL, Oral, QID PRN    dextrose 50%, 12.5 g, Intravenous, PRN    dextrose 50%, 25 g, Intravenous, PRN    glucagon (human recombinant), 1 mg, Intramuscular, PRN    glucose, 16 g, Oral, PRN    glucose, 24 g, Oral, PRN    insulin aspart U-100, 0-5 Units, Subcutaneous, QID (AC + HS) PRN    naloxone, 0.02 mg, Intravenous, PRN    ondansetron, 4 mg, Intravenous, Q8H PRN    oxyCODONE-acetaminophen, 1 tablet, Oral, Q4H PRN    polyethylene glycol, 17 g, Oral, Daily PRN    promethazine, 25 mg, Oral, Q6H PRN    simethicone, 1 tablet, Oral, QID PRN    sodium chloride 0.9%, 10 mL, Intravenous, PRN    sodium chloride 0.9%, 10 mL,  Intravenous, PRN    sodium chloride 0.9%, 3 mL, Intravenous, Q12H PRN    zolpidem, 5 mg, Oral, Nightly PRN     Review of patient's allergies indicates:  No Known Allergies     Past Medical History:   Diagnosis Date    Arthritis     hands    Bilateral bunions     Borderline glaucoma     De Quervain's disease (radial styloid tenosynovitis)     Gastritis     upper GI 2017    Hydradenitis     Hyperlipidemia     Hypertension     Insomnia     Lung cancer     Migraines 2000    Nasal septum perforation     Obesity     Pneumonia     Restrictive airway disease     Sleep apnea     SVT (supraventricular tachycardia) 2013    Trigger finger     Type 2 diabetes mellitus      am 2024     Past Surgical History:   Procedure Laterality Date    AXILLARY HIDRADENITIS EXCISION Bilateral     BONE EXOSTOSIS EXCISION Right 2018    Procedure: EXCISION, EXOSTOSIS;  Surgeon: Jayro Pedraza Sr., MD;  Location: [facility];  Service: Orthopedics;  Laterality: Right;    BREAST BIOPSY Bilateral     both benign    BREAST SURGERY  1998    BRONCHOSCOPY Bilateral 1/15/2025    Procedure: Bronchoscopy - insert lighted tube into airway to take a biopsy of lung;  Surgeon: Adrian Robin MD;  Location: [facility];  Service: Pulmonary;  Laterality: Bilateral;    CARPAL TUNNEL RELEASE      bilateral    CARPAL TUNNEL RELEASE Right 2024    Procedure: RELEASE, CARPAL TUNNEL;  Surgeon: Jaime Oswald MD;  Location: [facility];  Service: Orthopedics;  Laterality: Right;    CARPAL TUNNEL RELEASE Left 2024    Procedure: RELEASE, CARPAL TUNNEL;  Surgeon: Jaime Oswald MD;  Location: [facility] OR;  Service: Orthopedics;  Laterality: Left;    CATARACT EXTRACTION Bilateral     OU     SECTION  1979    CHOLECYSTECTOMY  2014    COLONOSCOPY N/A 10/02/2020    Procedure: COLONOSCOPY;  Surgeon: Tushar Edwards MD;  Location: [facility];  Service: Endoscopy;  Laterality: N/A;    COLONOSCOPY W/ POLYPECTOMY   10/02/2020    Polyps x3, repeat 5 years; Tushar Edwards MD     CYST REMOVAL  07/2015    sebaceous cyst removed from face    DE QUERVAIN'S RELEASE Left 01/16/2020    Procedure: RELEASE, HAND, FOR DEQUERVAIN'S TENOSYNOVITIS;  Surgeon: Jaime Oswald MD;  Location: [facility];  Service: Orthopedics;  Laterality: Left;    DE QUERVAIN'S RELEASE Right 11/20/2020    Procedure: RELEASE, HAND, FOR DEQUERVAIN'S TENOSYNOVITIS;  Surgeon: Jaime Oswald MD;  Location: [facility];  Service: Orthopedics;  Laterality: Right;    ENDOBRONCHIAL ULTRASOUND Bilateral 04/10/2024    Procedure: ENDOBRONCHIAL ULTRASOUND (EBUS);  Surgeon: Adrian Robin MD;  Location: [facility];  Service: Pulmonary;  Laterality: Bilateral;    ENDOBRONCHIAL ULTRASOUND Bilateral 1/15/2025    Procedure: ENDOBRONCHIAL ULTRASOUND (EBUS);  Surgeon: Adrian Robin MD;  Location: [facility];  Service: Pulmonary;  Laterality: Bilateral;    EYE SURGERY      gastric sleeve  02/13/2017    Dr. Watson    INJECTION OF ANESTHETIC AGENT AROUND MULTIPLE INTERCOSTAL NERVES  5/10/2024    Procedure: BLOCK, NERVE, INTERCOSTAL, 2 OR MORE;  Surgeon: Mike Nuñez MD;  Location: [facility];  Service: Cardiothoracic;;    KNEE SURGERY Right     OLECRANON BURSECTOMY Right 07/25/2018    Procedure: BURSECTOMY, OLECRANON;  Surgeon: Jayro Pedraza Sr., MD;  Location: [facility];  Service: Orthopedics;  Laterality: Right;    SURGICAL REMOVAL OF BUNION WITH OSTEOTOMY OF METATARSAL BONE Left 05/10/2019    Procedure: BUNIONECTOMY, WITH METATARSAL OSTEOTOMY;  Surgeon: Srinivasan Villanueva DPM;  Location: [facility] OR;  Service: Podiatry;  Laterality: Left;    SURGICAL REMOVAL OF BUNION WITH OSTEOTOMY OF METATARSAL BONE Right 06/28/2019    Procedure: BUNIONECTOMY, WITH METATARSAL OSTEOTOMY;  Surgeon: Srinivasan Villanueva DPM;  Location: [facility];  Service: Podiatry;  Laterality: Right;    SURGICAL REMOVAL OF LYMPH NODE Left 5/10/2024    Procedure: EXCISION, LYMPH NODE;  Surgeon: Savannah  Mike AGUILERA MD;  Location: [facility] OR;  Service: Cardiothoracic;  Laterality: Left;    TONSILLECTOMY, ADENOIDECTOMY  1980s    TRANSESOPHAGEAL ECHOCARDIOGRAM WITH POSSIBLE CARDIOVERSION (LAKESHIA W/ POSS CARDIOVERSION) N/A 5/15/2024    Procedure: Transesophageal echo (LAKESHIA) intra-procedure log documentation;  Surgeon: Twan Pelaez MD;  Location: [facility] CATH LAB;  Service: Cardiology;  Laterality: N/A;    TRIGGER FINGER RELEASE Right 2015    Dr. Milo HALL ROBOTIC RATS,WITH LOBECTOMY,LUNG Left 5/10/2024    Procedure: XI ROBOTIC RATS,WITH LOBECTOMY,LUNG;  Surgeon: Mike Nuñez MD;  Location: [facility] OR;  Service: Cardiothoracic;  Laterality: Left;  Lingulectomy     Family History       Problem Relation (Age of Onset)    Diabetes Maternal Aunt, Cousin    Hypertension Maternal Grandmother    Prostate cancer Brother          Tobacco Use    Smoking status: Former     Current packs/day: 0.00     Average packs/day: 0.5 packs/day for 42.0 years (21.0 ttl pk-yrs)     Types: Cigarettes     Start date: 1970     Quit date: 2012     Years since quittin.2     Passive exposure: Past    Smokeless tobacco: Never   Substance and Sexual Activity    Alcohol use: Not Currently     Alcohol/week: 1.0 standard drink of alcohol     Types: 1 Glasses of wine per week     Comment: Glass red wine once a every 2 weeks    Drug use: Never    Sexual activity: Not Currently     Partners: Female     Birth control/protection: Abstinence, None       Review of Systems   Constitutional:  Positive for fatigue. Negative for activity change, appetite change, chills, diaphoresis, fever and unexpected weight change.   HENT:  Negative for congestion, dental problem, drooling, ear discharge, ear pain, facial swelling, hearing loss, mouth sores, nosebleeds, postnasal drip, rhinorrhea, sinus pressure, sneezing, sore throat, tinnitus, trouble swallowing and voice change.    Eyes:  Negative for photophobia, pain, discharge, redness, itching and visual  disturbance.   Respiratory:  Positive for cough and shortness of breath. Negative for choking, chest tightness, wheezing and stridor.    Cardiovascular:  Negative for chest pain, palpitations and leg swelling.   Gastrointestinal:  Negative for abdominal distention, abdominal pain, anal bleeding, blood in stool, constipation, diarrhea, nausea, rectal pain and vomiting.   Endocrine: Negative for cold intolerance, heat intolerance, polydipsia, polyphagia and polyuria.   Genitourinary:  Negative for decreased urine volume, difficulty urinating, dyspareunia, dysuria, enuresis, flank pain, frequency, genital sores, hematuria, menstrual problem, pelvic pain, urgency, vaginal bleeding, vaginal discharge and vaginal pain.   Musculoskeletal:  Positive for gait problem. Negative for arthralgias, back pain, joint swelling, myalgias, neck pain and neck stiffness.   Skin:  Negative for color change, pallor and rash.   Allergic/Immunologic: Negative for environmental allergies, food allergies and immunocompromised state.   Neurological:  Positive for weakness. Negative for dizziness, tremors, seizures, syncope, facial asymmetry, speech difficulty, light-headedness, numbness and headaches.   Hematological:  Negative for adenopathy. Does not bruise/bleed easily.   Psychiatric/Behavioral:  Negative for agitation, behavioral problems, confusion, decreased concentration, dysphoric mood, hallucinations, self-injury, sleep disturbance and suicidal ideas. The patient is not nervous/anxious and is not hyperactive.      Objective:     Vital Signs (Most Recent):  Temp: 99.5 °F (37.5 °C) (04/01/25 1202)  Pulse: 103 (04/02/25 0600)  Resp: 20 (04/02/25 0600)  BP: 123/66 (04/02/25 0600)  SpO2: (!) 93 % (04/02/25 0600) Vital Signs (24h Range):  Temp:  [98.5 °F (36.9 °C)-99.5 °F (37.5 °C)] 99.5 °F (37.5 °C)  Pulse:  [] 103  Resp:  [16-20] 20  SpO2:  [91 %-99 %] 93 %  BP: ()/(50-75) 123/66     Weight: 70.5 kg (155 lb 6.8 oz)  Body mass  index is 38.9 kg/m².  Body surface area is 1.62 meters squared.      Intake/Output Summary (Last 24 hours) at 4/2/2025 0641  Last data filed at 4/1/2025 2114  Gross per 24 hour   Intake 99.52 ml   Output --   Net 99.52 ml        Physical Exam  Vitals reviewed.   Constitutional:       General: She is in acute distress.      Appearance: She is well-developed. She is ill-appearing. She is not diaphoretic.   HENT:      Head: Normocephalic and atraumatic.      Right Ear: External ear normal.      Left Ear: External ear normal.      Nose: Nose normal.      Right Sinus: No maxillary sinus tenderness or frontal sinus tenderness.      Left Sinus: No maxillary sinus tenderness or frontal sinus tenderness.      Mouth/Throat:      Pharynx: No oropharyngeal exudate.   Eyes:      General: Lids are normal. No scleral icterus.        Right eye: No discharge.         Left eye: No discharge.      Conjunctiva/sclera: Conjunctivae normal.      Right eye: Right conjunctiva is not injected. No hemorrhage.     Left eye: Left conjunctiva is not injected. No hemorrhage.     Pupils: Pupils are equal, round, and reactive to light.   Neck:      Thyroid: No thyromegaly.      Vascular: No JVD.      Trachea: No tracheal deviation.   Cardiovascular:      Rate and Rhythm: Normal rate.   Pulmonary:      Effort: Pulmonary effort is normal. No respiratory distress.      Breath sounds: No stridor.   Chest:      Chest wall: No tenderness.   Abdominal:      General: Bowel sounds are normal. There is no distension.      Palpations: Abdomen is soft. There is no hepatomegaly, splenomegaly or mass.      Tenderness: There is no abdominal tenderness. There is no rebound.   Musculoskeletal:         General: No tenderness. Normal range of motion.      Cervical back: Normal range of motion and neck supple.   Lymphadenopathy:      Cervical: No cervical adenopathy.      Upper Body:      Right upper body: No supraclavicular adenopathy.      Left upper body: No  "supraclavicular adenopathy.   Skin:     General: Skin is dry.      Findings: No erythema or rash.   Neurological:      Mental Status: She is alert and oriented to person, place, and time.      Cranial Nerves: No cranial nerve deficit.      Motor: Weakness present.      Coordination: Coordination abnormal.      Gait: Gait abnormal.   Psychiatric:         Behavior: Behavior normal.         Thought Content: Thought content normal.         Judgment: Judgment normal.          Significant Labs:   CBC:   Recent Labs   Lab 04/01/25 1249 04/02/25 0435   WBC 2.10* 2.70*   HGB 12.3 11.9*   HCT 36.6* 34.9*   * 83*   , CMP:   Recent Labs   Lab 04/01/25 1249 04/02/25 0435   * 134*   K 4.2 3.7    105   CO2 21* 21*   BUN 13 7*   CREATININE 1.0 0.5   CALCIUM 9.1 9.0   ALBUMIN 3.1* 2.7*   BILITOT 0.9 0.7   ALKPHOS 115 101   * 59*   * 103*   ANIONGAP 12 8   , Coagulation: No results for input(s): "PT", "INR", "APTT" in the last 48 hours., Haptoglobin: No results for input(s): "HAPTOGLOBIN" in the last 48 hours., Immunology: No results for input(s): "SPEP", "LORNA", "DEBORAH", "FREELAMBDALI" in the last 48 hours., LDH: No results for input(s): "LDHCSF", "BFSOURCE" in the last 48 hours., LFTs:   Recent Labs   Lab 04/01/25 1249 04/02/25 0435   * 103*   * 59*   ALKPHOS 115 101   BILITOT 0.9 0.7   ALBUMIN 3.1* 2.7*   , Reticulocytes: No results for input(s): "RETIC" in the last 48 hours., Tumor Markers: No results for input(s): "PSA", "CEA", "", "AFPTM", "CH5675", "" in the last 48 hours.    Invalid input(s): "ALGTM", Uric Acid No results for input(s): "URICACID" in the last 48 hours., and Urine Studies:   Recent Labs   Lab 04/01/25  1539   COLORU Yellow   APPEARANCEUA Clear   PHUR 6.0   SPECGRAV 1.015   PROTEINUA Trace*   GLUCUA Negative   BILIRUBINUA Negative   OCCULTUA Trace*   NITRITE Negative   UROBILINOGEN 2.0-3.0*   LEUKOCYTESUR Negative       Diagnostic Results:  I have " reviewed all pertinent imaging results/findings within the past 24 hours.  Assessment/Plan:     Hypotension  Patient is admitted after being seen by radiation oncology and found to be hypotensive.  Agree with evaluation in emergency room for potential sepsis    Multifocal pneumonia  Multifocal pneumonia.  Agree with current plan course of treatment non neutropenic    Malignant neoplasm of lower lobe of left lung  Patient with locally advanced non-small cell lung carcinoma completed carboplatin Taxol.  Patient is now admitted to the hospital with multifocal pneumonia non neutropenic.  Agree with current plan course of action for treatment once patient is medically stabilized will reassess in the outpatient setting for continuation of treatment with maintenance immunotherapy.  She has NOT received immunotherapy at the present time        Thank you for your consult. I will follow-up with patient. Please contact us if you have any additional questions.    Emerson Moran MD  Hematology/Oncology  [facility] - Emergency Dept.

## 2025-04-02 NOTE — ASSESSMENT & PLAN NOTE
Patient with locally advanced non-small cell lung carcinoma completed carboplatin Taxol.  Patient is now admitted to the hospital with multifocal pneumonia non neutropenic.  Agree with current plan course of action for treatment once patient is medically stabilized will reassess in the outpatient setting for continuation of treatment with maintenance immunotherapy.  She has NOT received immunotherapy at the present time  04/03/2025.  Patient appears to be improving overall sense of well-being less cough shortness of breath feels much stronger variant if patient is discharged will continue follow-up in outpatient setting as previously scheduled

## 2025-04-02 NOTE — SUBJECTIVE & OBJECTIVE
Past Medical History:   Diagnosis Date    Arthritis     hands    Bilateral bunions     Borderline glaucoma     De Quervain's disease (radial styloid tenosynovitis)     Gastritis     upper GI 2017    Hydradenitis     Hyperlipidemia     Hypertension     Insomnia     Lung cancer     Migraines 2000    Nasal septum perforation     Obesity     Pneumonia     Restrictive airway disease     Sleep apnea     SVT (supraventricular tachycardia) 2013    Trigger finger     Type 2 diabetes mellitus      am 2024       Past Surgical History:   Procedure Laterality Date    AXILLARY HIDRADENITIS EXCISION Bilateral     BONE EXOSTOSIS EXCISION Right 2018    Procedure: EXCISION, EXOSTOSIS;  Surgeon: Jayro Pedraza Sr., MD;  Location: [facility] OR;  Service: Orthopedics;  Laterality: Right;    BREAST BIOPSY Bilateral     both benign    BREAST SURGERY  1998    BRONCHOSCOPY Bilateral 1/15/2025    Procedure: Bronchoscopy - insert lighted tube into airway to take a biopsy of lung;  Surgeon: Adrian Robin MD;  Location: [facility] ENDO;  Service: Pulmonary;  Laterality: Bilateral;    CARPAL TUNNEL RELEASE      bilateral    CARPAL TUNNEL RELEASE Right 2024    Procedure: RELEASE, CARPAL TUNNEL;  Surgeon: Jaime Oswald MD;  Location: [facility] OR;  Service: Orthopedics;  Laterality: Right;    CARPAL TUNNEL RELEASE Left 2024    Procedure: RELEASE, CARPAL TUNNEL;  Surgeon: Jaime Oswald MD;  Location: [facility] OR;  Service: Orthopedics;  Laterality: Left;    CATARACT EXTRACTION Bilateral     OU     SECTION  1979    CHOLECYSTECTOMY  2014    COLONOSCOPY N/A 10/02/2020    Procedure: COLONOSCOPY;  Surgeon: Tushar Edwards MD;  Location: [facility] ENDO;  Service: Endoscopy;  Laterality: N/A;    COLONOSCOPY W/ POLYPECTOMY  10/02/2020    Polyps x3, repeat 5 years; Tushar Edwards MD     CYST REMOVAL  2015    sebaceous cyst removed from face    DE QUERVAIN'S RELEASE Left 2020     Procedure: RELEASE, HAND, FOR DEQUERVAIN'S TENOSYNOVITIS;  Surgeon: Jaime Oswald MD;  Location: [facility] OR;  Service: Orthopedics;  Laterality: Left;    DE QUERVAIN'S RELEASE Right 11/20/2020    Procedure: RELEASE, HAND, FOR DEQUERVAIN'S TENOSYNOVITIS;  Surgeon: Jaime Oswald MD;  Location: [facility];  Service: Orthopedics;  Laterality: Right;    ENDOBRONCHIAL ULTRASOUND Bilateral 04/10/2024    Procedure: ENDOBRONCHIAL ULTRASOUND (EBUS);  Surgeon: Adrian Robin MD;  Location: [facility];  Service: Pulmonary;  Laterality: Bilateral;    ENDOBRONCHIAL ULTRASOUND Bilateral 1/15/2025    Procedure: ENDOBRONCHIAL ULTRASOUND (EBUS);  Surgeon: Adrian Robin MD;  Location: [facility];  Service: Pulmonary;  Laterality: Bilateral;    EYE SURGERY      gastric sleeve  02/13/2017    Dr. Watson    INJECTION OF ANESTHETIC AGENT AROUND MULTIPLE INTERCOSTAL NERVES  5/10/2024    Procedure: BLOCK, NERVE, INTERCOSTAL, 2 OR MORE;  Surgeon: Mike Nuñez MD;  Location: [facility];  Service: Cardiothoracic;;    KNEE SURGERY Right     OLECRANON BURSECTOMY Right 07/25/2018    Procedure: BURSECTOMY, OLECRANON;  Surgeon: Jayor Pedraza Sr., MD;  Location: [facility];  Service: Orthopedics;  Laterality: Right;    SURGICAL REMOVAL OF BUNION WITH OSTEOTOMY OF METATARSAL BONE Left 05/10/2019    Procedure: BUNIONECTOMY, WITH METATARSAL OSTEOTOMY;  Surgeon: Srinivasan Villanueva DPM;  Location: [facility];  Service: Podiatry;  Laterality: Left;    SURGICAL REMOVAL OF BUNION WITH OSTEOTOMY OF METATARSAL BONE Right 06/28/2019    Procedure: BUNIONECTOMY, WITH METATARSAL OSTEOTOMY;  Surgeon: Srinivaasn Villanueva DPM;  Location: [facility] OR;  Service: Podiatry;  Laterality: Right;    SURGICAL REMOVAL OF LYMPH NODE Left 5/10/2024    Procedure: EXCISION, LYMPH NODE;  Surgeon: Mike Nuñez MD;  Location: [facility];  Service: Cardiothoracic;  Laterality: Left;    TONSILLECTOMY, ADENOIDECTOMY  1980s    TRANSESOPHAGEAL ECHOCARDIOGRAM WITH POSSIBLE  CARDIOVERSION (LAKESHIA W/ POSS CARDIOVERSION) N/A 5/15/2024    Procedure: Transesophageal echo (LAKESHIA) intra-procedure log documentation;  Surgeon: Twan Pelaez MD;  Location: [facility] CATH LAB;  Service: Cardiology;  Laterality: N/A;    TRIGGER FINGER RELEASE Right 04/01/2015    Dr. Milo HALL ROBOTIC RATS,WITH LOBECTOMY,LUNG Left 5/10/2024    Procedure: XI ROBOTIC RATS,WITH LOBECTOMY,LUNG;  Surgeon: Mike Nuñez MD;  Location: [facility] OR;  Service: Cardiothoracic;  Laterality: Left;  Lingulectomy       Review of patient's allergies indicates:  No Known Allergies    Current Facility-Administered Medications on File Prior to Encounter   Medication    sodium chloride 0.9% flush 10 mL    sodium chloride 0.9% flush 10 mL     Current Outpatient Medications on File Prior to Encounter   Medication Sig    albuterol (PROVENTIL/VENTOLIN HFA) 90 mcg/actuation inhaler INHALE 2 PUFFS INTO THE LUNGS EVERY 4 HOURS AS NEEDED FOR WHEEZING OR SHORTNESS OF BREATH.    amitriptyline (ELAVIL) 50 MG tablet Take 1 tablet (50 mg total) by mouth every evening.    aspirin (ECOTRIN) 81 MG EC tablet Take 1 tablet (81 mg total) by mouth once daily.    blood sugar diagnostic Strp use to test blood sugar 1-2 times a day    blood-glucose meter (ACCU-CHEK GUIDE ME GLUCOSE MTR) Misc use to check blood glucose 2 times a day    blood-glucose meter (ACCU-CHEK GUIDE ME GLUCOSE MTR) Misc To check blood glucose 1-2 times daily, to use with insurance preferred meter    diphenhydrAMINE-aluminum-magnesium hydroxide-simethicone-LIDOcaine viscous HCl 2% Swish and spit 15 mLs every 4 (four) hours as needed.    eszopiclone (LUNESTA) 3 mg Tab Take 1 tablet (3 mg total) by mouth every evening.    lancets Misc Use to test blood glucose 1-2 times a day, discard lancet after each use    LORazepam (ATIVAN) 1 MG tablet Take 1 tablet (1 mg total) by mouth 2 (two) times daily.    losartan (COZAAR) 25 MG tablet Take 1 tablet (25 mg total) by mouth once daily.    magic mouthwash  diphen/antac/lidoc swish and spit 15ml every 4 hours as needed    metoprolol succinate (TOPROL-XL) 50 MG 24 hr tablet Take 1 tablet (50 mg total) by mouth once daily.    OLANZapine (ZYPREXA) 5 MG tablet Take 1 tablet (5 mg total) by mouth every evening. On days 1-3 of each chemotherapy cycle.    omeprazole (PRILOSEC) 20 MG capsule Take 1 capsule (20 mg total) by mouth once daily.    oxyCODONE-acetaminophen (PERCOCET)  mg per tablet Take 1 tablet by mouth every 4 (four) hours as needed for Pain.    prochlorperazine (COMPAZINE) 5 MG tablet Take 1 tablet (5 mg total) by mouth every 6 (six) hours as needed for Nausea.    semaglutide (OZEMPIC) 2 mg/dose (8 mg/3 mL) PnIj Inject 2 mg into the skin every 7 days.    traMADoL (ULTRAM) 50 mg tablet Take 1 tablet (50 mg total) by mouth 2 (two) times a day.    traMADoL (ULTRAM) 50 mg tablet Take 1 tablet (50 mg total) by mouth every 12 (twelve) hours as needed for Pain. Greater than 7 day supply medically necessary.    triamcinolone acetonide 0.025 % Lotn Apply small amount to affected areas twice a day.  Take cool showers or baths     Family History       Problem Relation (Age of Onset)    Diabetes Maternal Aunt, Cousin    Hypertension Maternal Grandmother    Prostate cancer Brother          Tobacco Use    Smoking status: Former     Current packs/day: 0.00     Average packs/day: 0.5 packs/day for 42.0 years (21.0 ttl pk-yrs)     Types: Cigarettes     Start date: 1970     Quit date: 2012     Years since quittin.2     Passive exposure: Past    Smokeless tobacco: Never   Substance and Sexual Activity    Alcohol use: Not Currently     Alcohol/week: 1.0 standard drink of alcohol     Types: 1 Glasses of wine per week     Comment: Glass red wine once a every 2 weeks    Drug use: Never    Sexual activity: Not Currently     Partners: Female     Birth control/protection: Abstinence, None     Review of Systems   Constitutional:  Positive for fatigue. Negative for  chills, diaphoresis and fever.   Respiratory:  Positive for cough and shortness of breath.    Cardiovascular:  Positive for chest pain. Negative for palpitations and leg swelling.   Gastrointestinal:  Negative for abdominal pain, diarrhea, nausea and vomiting.   Neurological:  Positive for dizziness, weakness (Generalized) and light-headedness.   All other systems reviewed and are negative.    Objective:     Vital Signs (Most Recent):  Temp: 99.5 °F (37.5 °C) (04/01/25 1202)  Pulse: 103 (04/02/25 0600)  Resp: 20 (04/02/25 0600)  BP: 123/66 (04/02/25 0600)  SpO2: (!) 93 % (04/02/25 0600) Vital Signs (24h Range):  Temp:  [98.5 °F (36.9 °C)-99.5 °F (37.5 °C)] 99.5 °F (37.5 °C)  Pulse:  [] 103  Resp:  [16-20] 20  SpO2:  [91 %-99 %] 93 %  BP: ()/(50-75) 123/66     Weight: 70.5 kg (155 lb 6.8 oz)  Body mass index is 38.9 kg/m².     Physical Exam  Vitals and nursing note reviewed.   Constitutional:       General: She is awake. She is not in acute distress.     Appearance: Normal appearance. She is well-developed and well-groomed. She is ill-appearing. She is not toxic-appearing or diaphoretic.   HENT:      Head: Normocephalic and atraumatic.      Mouth/Throat:      Lips: Pink.      Mouth: Mucous membranes are moist.      Pharynx: Oropharynx is clear. Uvula midline.   Eyes:      Extraocular Movements: Extraocular movements intact.      Conjunctiva/sclera: Conjunctivae normal.      Pupils: Pupils are equal, round, and reactive to light.   Cardiovascular:      Rate and Rhythm: Normal rate and regular rhythm.      Heart sounds: Normal heart sounds. No murmur heard.  Pulmonary:      Effort: Pulmonary effort is normal.      Breath sounds: Decreased breath sounds present. No wheezing, rhonchi or rales.   Abdominal:      General: Bowel sounds are normal.      Palpations: Abdomen is soft.      Tenderness: There is no abdominal tenderness. There is no right CVA tenderness, left CVA tenderness, guarding or rebound.    Musculoskeletal:      Cervical back: Normal range of motion and neck supple.      Right lower leg: No edema.      Left lower leg: No edema.      Comments: 5/5 strength throughout   Skin:     General: Skin is warm and dry.      Capillary Refill: Capillary refill takes less than 2 seconds.   Neurological:      General: No focal deficit present.      Mental Status: She is alert and oriented to person, place, and time. Mental status is at baseline.      GCS: GCS eye subscore is 4. GCS verbal subscore is 5. GCS motor subscore is 6.      Cranial Nerves: Cranial nerves 2-12 are intact.      Sensory: Sensation is intact.      Motor: Motor function is intact.   Psychiatric:         Mood and Affect: Mood normal.         Speech: Speech normal.         Behavior: Behavior normal. Behavior is cooperative.              CRANIAL NERVES     CN III, IV, VI   Pupils are equal, round, and reactive to light.     LABS:  Recent Results (from the past 24 hours)   EKG 12-lead    Collection Time: 04/01/25 12:25 PM   Result Value Ref Range    QRS Duration 74 ms    [facility] QTC Calculation 462 ms   COVID-19 Rapid Screening    Collection Time: 04/01/25 12:29 PM   Result Value Ref Range    SARS COV-2 Molecular Negative Negative   Influenza A & B by Molecular    Collection Time: 04/01/25 12:29 PM    Specimen: Nasopharyngeal Swab   Result Value Ref Range    INFLUENZA A MOLECULAR Negative Negative    INFLUENZA B MOLECULAR  Negative Negative   Comprehensive metabolic panel    Collection Time: 04/01/25 12:49 PM   Result Value Ref Range    Sodium 133 (L) 136 - 145 mmol/L    Potassium 4.2 3.5 - 5.1 mmol/L    Chloride 100 95 - 110 mmol/L    CO2 21 (L) 23 - 29 mmol/L    Glucose 154 (H) 70 - 110 mg/dL    BUN 13 8 - 23 mg/dL    Creatinine 1.0 0.5 - 1.4 mg/dL    Calcium 9.1 8.7 - 10.5 mg/dL    Protein Total 7.9 6.0 - 8.4 gm/dL    Albumin 3.1 (L) 3.5 - 5.2 g/dL    Bilirubin Total 0.9 0.1 - 1.0 mg/dL     40 - 150 unit/L     (H) 11 - 45 unit/L    ALT  163 (H) 10 - 44 unit/L    Anion Gap 12 8 - 16 mmol/L    eGFR 59 (L) >60 mL/min/1.73/m2   Lactic acid, plasma #1    Collection Time: 04/01/25 12:49 PM   Result Value Ref Range    Lactic Acid Level 5.1 (HH) 0.5 - 2.2 mmol/L   Magnesium    Collection Time: 04/01/25 12:49 PM   Result Value Ref Range    Magnesium  1.6 1.6 - 2.6 mg/dL   Troponin I    Collection Time: 04/01/25 12:49 PM   Result Value Ref Range    Troponin-I 0.276 (H) <=0.026 ng/mL   Procalcitonin    Collection Time: 04/01/25 12:49 PM   Result Value Ref Range    Procalcitonin 6.41 (H) <0.25 ng/mL   CBC with Differential    Collection Time: 04/01/25 12:49 PM   Result Value Ref Range    WBC 2.10 (L) 3.90 - 12.70 K/uL    RBC 4.69 4.00 - 5.40 M/uL    HGB 12.3 12.0 - 16.0 gm/dL    HCT 36.6 (L) 37.0 - 48.5 %    MCV 78 (L) 82 - 98 fL    MCH 26.2 (L) 27.0 - 31.0 pg    MCHC 33.6 32.0 - 36.0 g/dL    RDW 19.5 (H) 11.5 - 14.5 %    Platelet Count 111 (L) 150 - 450 K/uL    MPV 9.0 (L) 9.2 - 12.9 fL    Nucleated RBC 0 <=0 /100 WBC   Manual Differential    Collection Time: 04/01/25 12:49 PM   Result Value Ref Range    Gran # (ANC) 1.2 K/uL    Segmented Neutrophil % 46.0 38.0 - 73.0 %    Bands % 10.0 %    Lymphocyte % 25.0 18.0 - 48.0 %    Monocyte % 7.0 4.0 - 15.0 %    Metamyelocyte % 12.0 %    Platelet Estimate Decreased (A)      Anisocytosis Slight     Hypochromia Occasional     Target Cell Occasional     Stomatocytes Present     Large/Giant Platelets Present    Urinalysis, Reflex to Urine Culture    Collection Time: 04/01/25  3:39 PM    Specimen: Urine   Result Value Ref Range    Color, UA Yellow Straw, Yoon, Yellow, Light-Orange    Appearance, UA Clear Clear    pH, UA 6.0 5.0 - 8.0    Spec Grav UA 1.015 1.005 - 1.030    Protein, UA Trace (A) Negative    Glucose, UA Negative Negative    Ketones, UA Negative Negative    Bilirubin, UA Negative Negative    Blood, UA Trace (A) Negative    Nitrites, UA Negative Negative    Urobilinogen, UA 2.0-3.0 (A) <2.0 EU/dL     Leukocyte Esterase, UA Negative Negative   Lactic acid, plasma #2    Collection Time: 04/01/25  6:04 PM   Result Value Ref Range    Lactic Acid Level 2.3 (H) 0.5 - 2.2 mmol/L   POCT glucose    Collection Time: 04/01/25  8:16 PM   Result Value Ref Range    POCT Glucose 127 (H) 70 - 110 mg/dL   Comprehensive metabolic panel    Collection Time: 04/02/25  4:35 AM   Result Value Ref Range    Sodium 134 (L) 136 - 145 mmol/L    Potassium 3.7 3.5 - 5.1 mmol/L    Chloride 105 95 - 110 mmol/L    CO2 21 (L) 23 - 29 mmol/L    Glucose 106 70 - 110 mg/dL    BUN 7 (L) 8 - 23 mg/dL    Creatinine 0.5 0.5 - 1.4 mg/dL    Calcium 9.0 8.7 - 10.5 mg/dL    Protein Total 7.2 6.0 - 8.4 gm/dL    Albumin 2.7 (L) 3.5 - 5.2 g/dL    Bilirubin Total 0.7 0.1 - 1.0 mg/dL     40 - 150 unit/L    AST 59 (H) 11 - 45 unit/L     (H) 10 - 44 unit/L    Anion Gap 8 8 - 16 mmol/L    eGFR >60 >60 mL/min/1.73/m2   CBC with Differential    Collection Time: 04/02/25  4:35 AM   Result Value Ref Range    WBC 2.70 (L) 3.90 - 12.70 K/uL    RBC 4.56 4.00 - 5.40 M/uL    HGB 11.9 (L) 12.0 - 16.0 gm/dL    HCT 34.9 (L) 37.0 - 48.5 %    MCV 77 (L) 82 - 98 fL    MCH 26.1 (L) 27.0 - 31.0 pg    MCHC 34.1 32.0 - 36.0 g/dL    RDW 19.2 (H) 11.5 - 14.5 %    Platelet Count 83 (L) 150 - 450 K/uL    MPV 8.7 (L) 9.2 - 12.9 fL    Nucleated RBC 0 <=0 /100 WBC    Neut % 81.9 (H) 38 - 73 %    Lymph % 13.0 (L) 18 - 48 %    Mono % 3.3 (L) 4 - 15 %    Eos % 0.0 <=8 %    Basophil % 0.7 <=1.9 %    Imm Grans % 1.1 (H) 0.0 - 0.5 %    Neut # 2.21 1.8 - 7.7 K/uL    Lymph # 0.35 (L) 1 - 4.8 K/uL    Mono # 0.09 (L) 0.3 - 1 K/uL    Eos # 0.00 <=0.5 K/uL    Baso # 0.02 <=0.2 K/uL    Imm Grans # 0.03 0.00 - 0.04 K/uL   Platelet Review    Collection Time: 04/02/25  4:35 AM   Result Value Ref Range    Platelet Estimate Decreased (A)         RADIOLOGY  CT Chest Abdomen Pelvis With IV Contrast (XPD) Routine Oral Contrast  Result Date: 4/1/2025  EXAM: CT CHEST ABDOMEN PELVIS WITH IV CONTRAST  (XPD) CLINICAL HISTORY: Sepsis COMPARISON: 12/11/2024 TECHNIQUE: Standard thin-section axial images, with reformatted sagittal and coronal images. FINDINGS: Chest: There is no mediastinal, hilar or axillary lymphadenopathy.  No pericardial effusion.  Moderate severe coronary artery calcification. Multifocal groundglass opacities throughout the left lung most pronounced in the lower lobe.  Additional linear bands of atelectasis in the left upper lobe posteriorly extending to the major fissure. Minimal atelectasis at the right lung base.  No pleural effusions.  No pulmonary nodules. ABDOMEN: Cholecystectomy.  Liver, spleen, pancreas, adrenals, kidneys and ureters are unremarkable. Vascular structures are unremarkable with the exception of atherosclerotic calcification. No abdominal or retroperitoneal lymphadenopathy.  No ascites or fat stranding within the abdomen.  No dilatation of the large or small bowel.  No evidence for appendicitis.  Postsurgical changes at the stomach. Pelvis: Small amount of air within the bladder.  No pelvic free fluid, iliac chain or inguinal lymphadenopathy.  Atrophic uterus and adnexal regions are unremarkable. Osseous structures: Small fat-containing anterior abdominal wall defect just below the umbilicus without evidence for incarceration.  No concerning lytic or sclerotic bony lesions.     1.  Multifocal groundglass opacities throughout the left lung most pronounced in the lower lobe, infectious or inflammatory. 2.  Otherwise, no additional acute findings. All CT scans at [this location] are performed using dose modulation techniques as appropriate to a performed exam including the following:  Automated exposure control; adjustment of the mA and/or kV according to patient size (this includes techniques or standardized protocols for targeted exams where dose is matched to indication / reason for exam; i.e. extremities or head); use of iterative reconstruction technique. Finalized on:  4/1/2025 6:16 PM By:  Raymond Boggs [facility]# [id number]      2025-04-01 18:18:[id number]     [facility]    X-Ray Chest AP Portable  Result Date: 4/1/2025  EXAMINATION: XR CHEST AP PORTABLE CLINICAL HISTORY: , Sepsis; COMPARISON: Chest, 02/27/2025 FINDINGS: One view.  Stable heart size.  Discoid atelectasis mid left lung.  No definite infiltrates.     No acute infiltrates. Electronically signed by: Juliette Prakash MD Date:    04/01/2025 Time:    12:44      EKG    MICROBIOLOGY    MDM     Amount and/or Complexity of Data Reviewed  Clinical lab tests: reviewed  Tests in the radiology section of CPT®: reviewed  Tests in the medicine section of CPT®: reviewed  Discussion of test results with the performing providers: yes  Decide to obtain previous medical records or to obtain history from someone other than the patient: yes  Obtain history from someone other than the patient: yes  Review and summarize past medical records: yes  Discuss the patient with other providers: yes  Independent visualization of images, tracings, or specimens: yes

## 2025-04-02 NOTE — CONSULTS
[facility] - Emergency Dept.  Hematology/Oncology  Consult Note    Patient Name: Cassandra Campos  MRN: [id number]  Admission Date: 4/1/2025  Hospital Length of Stay: 1 days  Code Status: Full Code   Attending Provider: No att. providers found  Consulting Provider: Emerson Moran MD  Primary Care Physician: Emil Zhang MD  Principal Problem:Sepsis    Consults  Subjective:     HPI:  74-year-old female history of stage III non-small cell lung carcinoma receiving concurrent carboplatin and Taxol.  Patient completed her last dose of Carbo platinum and Taxol last week.  Presented for final 2 doses of radiation therapy.  The patient had extreme weakness hypotension was seen by radiation oncology in recommended evaluation in the emergency room.  Was asked to see the patient for evaluation in treatment recommendations    Oncology Treatment Plan:   OP NSCLC PACLitaxel CARBOplatin QW + radiation    Medications:  Continuous Infusions:  Scheduled Meds:   amitriptyline  50 mg Oral QHS    ceFEPime IV (PEDS and ADULTS)  2 g Intravenous Q12H    doxycycline IV (PEDS and ADULTS)  100 mg Intravenous Q12H    enoxparin  40 mg Subcutaneous Daily    LORazepam  1 mg Oral BID    metoprolol succinate  50 mg Oral Daily     PRN Meds:  Current Facility-Administered Medications:     aluminum-magnesium hydroxide-simethicone, 30 mL, Oral, QID PRN    dextrose 50%, 12.5 g, Intravenous, PRN    dextrose 50%, 25 g, Intravenous, PRN    glucagon (human recombinant), 1 mg, Intramuscular, PRN    glucose, 16 g, Oral, PRN    glucose, 24 g, Oral, PRN    insulin aspart U-100, 0-5 Units, Subcutaneous, QID (AC + HS) PRN    naloxone, 0.02 mg, Intravenous, PRN    ondansetron, 4 mg, Intravenous, Q8H PRN    oxyCODONE-acetaminophen, 1 tablet, Oral, Q4H PRN    polyethylene glycol, 17 g, Oral, Daily PRN    promethazine, 25 mg, Oral, Q6H PRN    simethicone, 1 tablet, Oral, QID PRN    sodium chloride 0.9%, 10 mL, Intravenous, PRN    sodium chloride 0.9%, 10 mL,  Intravenous, PRN    sodium chloride 0.9%, 3 mL, Intravenous, Q12H PRN    zolpidem, 5 mg, Oral, Nightly PRN     Review of patient's allergies indicates:  No Known Allergies     Past Medical History:   Diagnosis Date    Arthritis     hands    Bilateral bunions     Borderline glaucoma     De Quervain's disease (radial styloid tenosynovitis)     Gastritis     upper GI 2017    Hydradenitis     Hyperlipidemia     Hypertension     Insomnia     Lung cancer     Migraines 2000    Nasal septum perforation     Obesity     Pneumonia     Restrictive airway disease     Sleep apnea     SVT (supraventricular tachycardia) 2013    Trigger finger     Type 2 diabetes mellitus      am 2024     Past Surgical History:   Procedure Laterality Date    AXILLARY HIDRADENITIS EXCISION Bilateral     BONE EXOSTOSIS EXCISION Right 2018    Procedure: EXCISION, EXOSTOSIS;  Surgeon: Jayro Pedraza Sr., MD;  Location: [facility];  Service: Orthopedics;  Laterality: Right;    BREAST BIOPSY Bilateral     both benign    BREAST SURGERY  1998    BRONCHOSCOPY Bilateral 1/15/2025    Procedure: Bronchoscopy - insert lighted tube into airway to take a biopsy of lung;  Surgeon: Adrian Robin MD;  Location: [facility];  Service: Pulmonary;  Laterality: Bilateral;    CARPAL TUNNEL RELEASE      bilateral    CARPAL TUNNEL RELEASE Right 2024    Procedure: RELEASE, CARPAL TUNNEL;  Surgeon: Jaime Oswald MD;  Location: [facility];  Service: Orthopedics;  Laterality: Right;    CARPAL TUNNEL RELEASE Left 2024    Procedure: RELEASE, CARPAL TUNNEL;  Surgeon: Jaime Oswald MD;  Location: [facility] OR;  Service: Orthopedics;  Laterality: Left;    CATARACT EXTRACTION Bilateral     OU     SECTION  1979    CHOLECYSTECTOMY  2014    COLONOSCOPY N/A 10/02/2020    Procedure: COLONOSCOPY;  Surgeon: Tushar Edwards MD;  Location: [facility];  Service: Endoscopy;  Laterality: N/A;    COLONOSCOPY W/ POLYPECTOMY   10/02/2020    Polyps x3, repeat 5 years; Tushar Edwards MD     CYST REMOVAL  07/2015    sebaceous cyst removed from face    DE QUERVAIN'S RELEASE Left 01/16/2020    Procedure: RELEASE, HAND, FOR DEQUERVAIN'S TENOSYNOVITIS;  Surgeon: Jaime Oswald MD;  Location: [facility];  Service: Orthopedics;  Laterality: Left;    DE QUERVAIN'S RELEASE Right 11/20/2020    Procedure: RELEASE, HAND, FOR DEQUERVAIN'S TENOSYNOVITIS;  Surgeon: Jaime Oswald MD;  Location: [facility];  Service: Orthopedics;  Laterality: Right;    ENDOBRONCHIAL ULTRASOUND Bilateral 04/10/2024    Procedure: ENDOBRONCHIAL ULTRASOUND (EBUS);  Surgeon: Adrian Robin MD;  Location: [facility];  Service: Pulmonary;  Laterality: Bilateral;    ENDOBRONCHIAL ULTRASOUND Bilateral 1/15/2025    Procedure: ENDOBRONCHIAL ULTRASOUND (EBUS);  Surgeon: Adrian Robin MD;  Location: [facility];  Service: Pulmonary;  Laterality: Bilateral;    EYE SURGERY      gastric sleeve  02/13/2017    Dr. Watson    INJECTION OF ANESTHETIC AGENT AROUND MULTIPLE INTERCOSTAL NERVES  5/10/2024    Procedure: BLOCK, NERVE, INTERCOSTAL, 2 OR MORE;  Surgeon: Mike Nuñez MD;  Location: [facility];  Service: Cardiothoracic;;    KNEE SURGERY Right     OLECRANON BURSECTOMY Right 07/25/2018    Procedure: BURSECTOMY, OLECRANON;  Surgeon: Jayro Pedraza Sr., MD;  Location: [facility];  Service: Orthopedics;  Laterality: Right;    SURGICAL REMOVAL OF BUNION WITH OSTEOTOMY OF METATARSAL BONE Left 05/10/2019    Procedure: BUNIONECTOMY, WITH METATARSAL OSTEOTOMY;  Surgeon: Srinivasan Villanueva DPM;  Location: [facility] OR;  Service: Podiatry;  Laterality: Left;    SURGICAL REMOVAL OF BUNION WITH OSTEOTOMY OF METATARSAL BONE Right 06/28/2019    Procedure: BUNIONECTOMY, WITH METATARSAL OSTEOTOMY;  Surgeon: Srinivasan Villanueva DPM;  Location: [facility];  Service: Podiatry;  Laterality: Right;    SURGICAL REMOVAL OF LYMPH NODE Left 5/10/2024    Procedure: EXCISION, LYMPH NODE;  Surgeon: Savannah  Mike AGUILERA MD;  Location: [facility] OR;  Service: Cardiothoracic;  Laterality: Left;    TONSILLECTOMY, ADENOIDECTOMY  1980s    TRANSESOPHAGEAL ECHOCARDIOGRAM WITH POSSIBLE CARDIOVERSION (LAKESHIA W/ POSS CARDIOVERSION) N/A 5/15/2024    Procedure: Transesophageal echo (LAKESHIA) intra-procedure log documentation;  Surgeon: Twan Pelaez MD;  Location: [facility] CATH LAB;  Service: Cardiology;  Laterality: N/A;    TRIGGER FINGER RELEASE Right 2015    Dr. Milo HALL ROBOTIC RATS,WITH LOBECTOMY,LUNG Left 5/10/2024    Procedure: XI ROBOTIC RATS,WITH LOBECTOMY,LUNG;  Surgeon: Mike Nuñez MD;  Location: [facility] OR;  Service: Cardiothoracic;  Laterality: Left;  Lingulectomy     Family History       Problem Relation (Age of Onset)    Diabetes Maternal Aunt, Cousin    Hypertension Maternal Grandmother    Prostate cancer Brother          Tobacco Use    Smoking status: Former     Current packs/day: 0.00     Average packs/day: 0.5 packs/day for 42.0 years (21.0 ttl pk-yrs)     Types: Cigarettes     Start date: 1970     Quit date: 2012     Years since quittin.2     Passive exposure: Past    Smokeless tobacco: Never   Substance and Sexual Activity    Alcohol use: Not Currently     Alcohol/week: 1.0 standard drink of alcohol     Types: 1 Glasses of wine per week     Comment: Glass red wine once a every 2 weeks    Drug use: Never    Sexual activity: Not Currently     Partners: Female     Birth control/protection: Abstinence, None       Review of Systems   Constitutional:  Positive for activity change, fatigue and fever. Negative for appetite change, chills, diaphoresis and unexpected weight change.   HENT:  Negative for congestion, dental problem, drooling, ear discharge, ear pain, facial swelling, hearing loss, mouth sores, nosebleeds, postnasal drip, rhinorrhea, sinus pressure, sneezing, sore throat, tinnitus, trouble swallowing and voice change.    Eyes:  Negative for photophobia, pain, discharge, redness, itching and visual  disturbance.   Respiratory:  Positive for cough and shortness of breath. Negative for choking, chest tightness, wheezing and stridor.    Cardiovascular:  Positive for chest pain. Negative for palpitations and leg swelling.   Gastrointestinal:  Negative for abdominal distention, abdominal pain, anal bleeding, blood in stool, constipation, diarrhea, nausea, rectal pain and vomiting.   Endocrine: Negative for cold intolerance, heat intolerance, polydipsia, polyphagia and polyuria.   Genitourinary:  Negative for decreased urine volume, difficulty urinating, dyspareunia, dysuria, enuresis, flank pain, frequency, genital sores, hematuria, menstrual problem, pelvic pain, urgency, vaginal bleeding, vaginal discharge and vaginal pain.   Musculoskeletal:  Positive for gait problem. Negative for arthralgias, back pain, joint swelling, myalgias, neck pain and neck stiffness.   Skin:  Negative for color change, pallor and rash.   Allergic/Immunologic: Negative for environmental allergies, food allergies and immunocompromised state.   Neurological:  Positive for weakness. Negative for dizziness, tremors, seizures, syncope, facial asymmetry, speech difficulty, light-headedness, numbness and headaches.   Hematological:  Negative for adenopathy. Does not bruise/bleed easily.   Psychiatric/Behavioral:  Positive for dysphoric mood. Negative for agitation, behavioral problems, confusion, decreased concentration, hallucinations, self-injury, sleep disturbance and suicidal ideas. The patient is nervous/anxious. The patient is not hyperactive.      Objective:     Vital Signs (Most Recent):  Temp: 99.5 °F (37.5 °C) (04/01/25 1202)  Pulse: 103 (04/02/25 0600)  Resp: 20 (04/02/25 0600)  BP: 123/66 (04/02/25 0600)  SpO2: (!) 93 % (04/02/25 0600) Vital Signs (24h Range):  Temp:  [98.5 °F (36.9 °C)-99.5 °F (37.5 °C)] 99.5 °F (37.5 °C)  Pulse:  [] 103  Resp:  [16-20] 20  SpO2:  [91 %-99 %] 93 %  BP: ()/(50-75) 123/66     Weight: 70.5  kg (155 lb 6.8 oz)  Body mass index is 38.9 kg/m².  Body surface area is 1.62 meters squared.      Intake/Output Summary (Last 24 hours) at 4/2/2025 0638  Last data filed at 4/1/2025 2114  Gross per 24 hour   Intake 99.52 ml   Output --   Net 99.52 ml        Physical Exam  Vitals reviewed.   Constitutional:       General: She is not in acute distress.     Appearance: She is well-developed. She is ill-appearing. She is not diaphoretic.   HENT:      Head: Normocephalic and atraumatic.      Right Ear: External ear normal.      Left Ear: External ear normal.      Nose: Nose normal.      Right Sinus: No maxillary sinus tenderness or frontal sinus tenderness.      Left Sinus: No maxillary sinus tenderness or frontal sinus tenderness.      Mouth/Throat:      Pharynx: No oropharyngeal exudate.   Eyes:      General: Lids are normal. No scleral icterus.        Right eye: No discharge.         Left eye: No discharge.      Conjunctiva/sclera: Conjunctivae normal.      Right eye: Right conjunctiva is not injected. No hemorrhage.     Left eye: Left conjunctiva is not injected. No hemorrhage.     Pupils: Pupils are equal, round, and reactive to light.   Neck:      Thyroid: No thyromegaly.      Vascular: No JVD.      Trachea: No tracheal deviation.   Cardiovascular:      Rate and Rhythm: Normal rate.   Pulmonary:      Effort: Pulmonary effort is normal. No respiratory distress.      Breath sounds: No stridor.   Chest:      Chest wall: No tenderness.   Abdominal:      General: Bowel sounds are normal. There is no distension.      Palpations: Abdomen is soft. There is no hepatomegaly, splenomegaly or mass.      Tenderness: There is no abdominal tenderness. There is no rebound.   Musculoskeletal:         General: No tenderness. Normal range of motion.      Cervical back: Normal range of motion and neck supple.   Lymphadenopathy:      Cervical: No cervical adenopathy.      Upper Body:      Right upper body: No supraclavicular  "adenopathy.      Left upper body: No supraclavicular adenopathy.   Skin:     General: Skin is dry.      Findings: No erythema or rash.   Neurological:      Mental Status: She is alert and oriented to person, place, and time.      Cranial Nerves: No cranial nerve deficit.      Coordination: Coordination normal.   Psychiatric:         Behavior: Behavior normal.         Thought Content: Thought content normal.         Judgment: Judgment normal.          Significant Labs:   BMP:   Recent Labs   Lab 04/01/25 1249 04/02/25  0435   * 134*   K 4.2 3.7    105   CO2 21* 21*   BUN 13 7*   CREATININE 1.0 0.5   CALCIUM 9.1 9.0   MG 1.6  --    , CBC:   Recent Labs   Lab 04/01/25 1249 04/02/25  0435   WBC 2.10* 2.70*   HGB 12.3 11.9*   HCT 36.6* 34.9*   * 83*   , CMP:   Recent Labs   Lab 04/01/25 1249 04/02/25  0435   * 134*   K 4.2 3.7    105   CO2 21* 21*   BUN 13 7*   CREATININE 1.0 0.5   CALCIUM 9.1 9.0   ALBUMIN 3.1* 2.7*   BILITOT 0.9 0.7   ALKPHOS 115 101   * 59*   * 103*   ANIONGAP 12 8   , Coagulation: No results for input(s): "PT", "INR", "APTT" in the last 48 hours., Haptoglobin: No results for input(s): "HAPTOGLOBIN" in the last 48 hours., Immunology: No results for input(s): "SPEP", "LORNA", "DEBORAH", "FREELAMBDALI" in the last 48 hours., LDH: No results for input(s): "LDHCSF", "BFSOURCE" in the last 48 hours., LFTs:   Recent Labs   Lab 04/01/25 1249 04/02/25  0435   * 103*   * 59*   ALKPHOS 115 101   BILITOT 0.9 0.7   ALBUMIN 3.1* 2.7*   , Reticulocytes: No results for input(s): "RETIC" in the last 48 hours., Tumor Markers: No results for input(s): "PSA", "CEA", "", "AFPTM", "EK1060", "" in the last 48 hours.    Invalid input(s): "ALGTM", and Uric Acid No results for input(s): "URICACID" in the last 48 hours.    Diagnostic Results:  I have reviewed all pertinent imaging results/findings within the past 24 hours.  Assessment/Plan: "     Hypotension  Patient is admitted after being seen by radiation oncology and found to be hypotensive.  Agree with evaluation in emergency room for potential sepsis    Multifocal pneumonia  Multifocal pneumonia.  Agree with current plan course of treatment non neutropenic    Malignant neoplasm of lower lobe of left lung  Patient with locally advanced non-small cell lung carcinoma completed carboplatin Taxol.  Patient is now admitted to the hospital with multifocal pneumonia non neutropenic.  Agree with current plan course of action for treatment once patient is medically stabilized will reassess in the outpatient setting for continuation of treatment with maintenance immunotherapy.  She has NOT received immunotherapy at the present time        Thank you for your consult. I will follow-up with patient. Please contact us if you have any additional questions.    Emerson Moran MD  Hematology/Oncology  [facility] - Emergency Dept.

## 2025-04-02 NOTE — ASSESSMENT & PLAN NOTE
Patient has a diagnosis of pneumonia. The cause of the pneumonia is unknown at this time. The pneumonia is stable. The patient has the following signs/symptoms of pneumonia: cough, shortness of breath, and chest pain. The patient does not have a current oxygen requirement and the patient does not have a home oxygen requirement. I have reviewed the pertinent imaging. The following cultures have been collected: Blood cultures and Sputum culture The culture results are listed below.     Current antimicrobial regimen consists of the antibiotics listed below. Will monitor patient closely and continue current treatment plan unchanged.    Antibiotics (From admission, onward)      Start     Stop Route Frequency Ordered    04/02/25 1300  ceFEPIme injection 2 g         -- IV Every 8 hours (non-standard times) 04/02/25 1212    04/01/25 2000  doxycycline 100 mg in D5W 100 mL IVPB (MB+)         -- IV Every 12 hours (non-standard times) 04/01/25 1958            Microbiology Results (last 7 days)       Procedure Component Value Units Date/Time    Blood culture x two cultures. Draw prior to antibiotics. [[id number]]  (Normal) Collected: 04/01/25 1249    Order Status: Completed Specimen: Blood from Peripheral, Antecubital, Right Updated: 04/02/25 1001     Blood Culture No Growth After 6 Hours    Blood culture x two cultures. Draw prior to antibiotics. [[id number]]  (Normal) Collected: 04/01/25 1252    Order Status: Completed Specimen: Blood from Peripheral, Forearm, Right Updated: 04/02/25 1001     Blood Culture No Growth After 6 Hours    Influenza A & B by Molecular [[id number]]  (Normal) Collected: 04/01/25 1229    Order Status: Completed Specimen: Nasopharyngeal Swab Updated: 04/01/25 1301     INFLUENZA A MOLECULAR Negative     INFLUENZA B MOLECULAR  Negative

## 2025-04-02 NOTE — ASSESSMENT & PLAN NOTE
Chronic, controlled.  Latest blood pressure and vitals reviewed-   Temp:  [98.5 °F (36.9 °C)-99.5 °F (37.5 °C)]   Pulse:  []   Resp:  [16-20]   BP: ()/(50-75)   SpO2:  [91 %-99 %] .   Home meds for hypertension were reviewed and noted below.   Hypertension Medications              losartan (COZAAR) 25 MG tablet Take 1 tablet (25 mg total) by mouth once daily.    metoprolol succinate (TOPROL-XL) 50 MG 24 hr tablet Take 1 tablet (50 mg total) by mouth once daily.     While in the hospital, will manage blood pressure as follows; Adjust home antihypertensive regimen as follows- hold bp meds due to hypotension, resume once bp stable    Will utilize p.r.n. blood pressure medication only if patient's blood pressure greater than  160/90 and she develops symptoms such as worsening chest pain or shortness of breath.

## 2025-04-02 NOTE — ASSESSMENT & PLAN NOTE
This patient does have evidence of infective focus  My overall impression is sepsis.  Source: Respiratory  Antibiotics given-   Antibiotics (72h ago, onward)      Start     Stop Route Frequency Ordered    04/01/25 2000  doxycycline 100 mg in D5W 100 mL IVPB (MB+)         -- IV Every 12 hours (non-standard times) 04/01/25 1958 04/01/25 0500  ceFEPIme injection 2 g         -- IV Every 12 hours (non-standard times) 04/01/25 1958          Latest lactate reviewed-  Recent Labs   Lab 04/01/25  1804   LACTATE 2.3*       Fluid challenge Ideal Body Weight- The patient's ideal body weight is Ideal body weight: 39.4 kg (86 lb 12.3 oz) which will be used to calculate fluid bolus of 30 ml/kg for treatment of septic shock.      Post- resuscitation assessment No - Post resuscitation assessment not needed       Will Not start Pressors- Levophed for MAP of 65  Source control achieved by: IVFs, Abx,

## 2025-04-02 NOTE — ASSESSMENT & PLAN NOTE
Chronic. Stable. Not in acute exacerbation and currently denies endorsing any suicidal/homicidal ideations.   Plan:  -Continue home medications (ativan)

## 2025-04-02 NOTE — ASSESSMENT & PLAN NOTE
Patient has a diagnosis of pneumonia. The cause of the pneumonia is unknown at this time. The pneumonia is stable. The patient has the following signs/symptoms of pneumonia: cough, shortness of breath, and chest pain. The patient does not have a current oxygen requirement and the patient does not have a home oxygen requirement. I have reviewed the pertinent imaging. The following cultures have been collected: Blood cultures and Sputum culture The culture results are listed below.     Current antimicrobial regimen consists of the antibiotics listed below. Will monitor patient closely and continue current treatment plan unchanged.    Antibiotics (From admission, onward)      Start     Stop Route Frequency Ordered    04/01/25 2000  doxycycline 100 mg in D5W 100 mL IVPB (MB+)         -- IV Every 12 hours (non-standard times) 04/01/25 1958 04/01/25 0500  ceFEPIme injection 2 g         -- IV Every 12 hours (non-standard times) 04/01/25 1958            Microbiology Results (last 7 days)       Procedure Component Value Units Date/Time    Blood culture x two cultures. Draw prior to antibiotics. [[id number]] Collected: 04/01/25 1249    Order Status: Resulted Specimen: Blood from Peripheral, Antecubital, Right Updated: 04/01/25 1541    Influenza A & B by Molecular [[id number]]  (Normal) Collected: 04/01/25 1229    Order Status: Completed Specimen: Nasopharyngeal Swab Updated: 04/01/25 1301     INFLUENZA A MOLECULAR Negative     INFLUENZA B MOLECULAR  Negative    Blood culture x two cultures. Draw prior to antibiotics. [[id number]] Collected: 04/01/25 1252    Order Status: Resulted Specimen: Blood from Peripheral, Forearm, Right Updated: 04/01/25 1254

## 2025-04-03 VITALS
HEART RATE: 102 BPM | WEIGHT: 155.44 LBS | RESPIRATION RATE: 18 BRPM | TEMPERATURE: 99 F | BODY MASS INDEX: 38.9 KG/M2 | OXYGEN SATURATION: 95 % | SYSTOLIC BLOOD PRESSURE: 116 MMHG | DIASTOLIC BLOOD PRESSURE: 70 MMHG

## 2025-04-03 LAB
POCT GLUCOSE: 120 MG/DL (ref 70–110)
POCT GLUCOSE: 121 MG/DL (ref 70–110)

## 2025-04-03 PROCEDURE — 63600175 PHARM REV CODE 636 W HCPCS: Performed by: NURSE PRACTITIONER

## 2025-04-03 PROCEDURE — 25000003 PHARM REV CODE 250: Performed by: NURSE PRACTITIONER

## 2025-04-03 PROCEDURE — 63600175 PHARM REV CODE 636 W HCPCS: Performed by: INTERNAL MEDICINE

## 2025-04-03 PROCEDURE — 99232 SBSQ HOSP IP/OBS MODERATE 35: CPT | Mod: ,,, | Performed by: INTERNAL MEDICINE

## 2025-04-03 RX ORDER — CEFDINIR 300 MG/1
300 CAPSULE ORAL 2 TIMES DAILY
Qty: 20 CAPSULE | Refills: 0 | Status: SHIPPED | OUTPATIENT
Start: 2025-04-03 | End: 2025-04-13

## 2025-04-03 RX ORDER — DOXYCYCLINE HYCLATE 100 MG
100 TABLET ORAL 2 TIMES DAILY
Qty: 20 TABLET | Refills: 0 | Status: SHIPPED | OUTPATIENT
Start: 2025-04-03 | End: 2025-04-16

## 2025-04-03 RX ADMIN — CEFEPIME 2 G: 2 INJECTION, POWDER, FOR SOLUTION INTRAVENOUS at 12:04

## 2025-04-03 RX ADMIN — CEFEPIME 2 G: 2 INJECTION, POWDER, FOR SOLUTION INTRAVENOUS at 06:04

## 2025-04-03 RX ADMIN — LORAZEPAM 1 MG: 1 TABLET ORAL at 08:04

## 2025-04-03 RX ADMIN — DOXYCYCLINE 100 MG: 100 INJECTION, POWDER, LYOPHILIZED, FOR SOLUTION INTRAVENOUS at 09:04

## 2025-04-03 RX ADMIN — METOPROLOL SUCCINATE 50 MG: 50 TABLET, EXTENDED RELEASE ORAL at 08:04

## 2025-04-03 NOTE — PLAN OF CARE
Discharge education given. Pt verbalized an understanding. IV removed and catheter intact. Pt being discharged with bedside rx and personal belongings. Pt being transported by wheelchair to the front lobby where her sister is waiting for her at this time.

## 2025-04-03 NOTE — PLAN OF CARE
[facility] - Med Surg  Discharge Final Note    Primary Care Provider: Emil Zhang MD    Expected Discharge Date: 4/3/2025    Final Discharge Note (most recent)       Final Note - 04/03/25 1423          Final Note    Assessment Type Final Discharge Note     Anticipated Discharge Disposition Home or Self Care                     Important Message from Medicare             Contact Info       Emil Zhang MD   Specialty: Family Medicine   Relationship: PCP - General    [address] [place] [id number]   Phone: [phone]       Next Steps: Follow up          DC Dispo: home  PCP f/u: CM unable to schedule within 1 week, escalated to clinic staff.

## 2025-04-03 NOTE — PROGRESS NOTES
[facility]  H[facility]ology/Oncology  Progress Note    Patient Name: Cassandra Campos  Admission Date: 4/1/2025  Hospital Length of Stay: 2 days  Code Status: Full Code     Subjective:     HPI:  74-year-old female history of stage III non-small cell lung carcinoma receiving concurrent carboplatin and Taxol.  Patient completed her last dose of Carbo platinum and Taxol last week.  Presented for final 2 doses of radiation therapy.  The patient had extreme weakness hypotension was seen by radiation oncology in recommended evaluation in the emergency room.  Was asked to see the patient for evaluation in treatment recommendations    Oncology Treatment Plan:   OP NSCLC PACLitaxel CARBOplatin QW + radiation    Medications:  Continuous Infusions:  Scheduled Meds:   amitriptyline  50 mg Oral QHS    ceFEPime IV (PEDS and ADULTS)  2 g Intravenous Q12H    doxycycline IV (PEDS and ADULTS)  100 mg Intravenous Q12H    enoxparin  40 mg Subcutaneous Daily    LORazepam  1 mg Oral BID    metoprolol succinate  50 mg Oral Daily     PRN Meds:  Current Facility-Administered Medications:     aluminum-magnesium hydroxide-simethicone, 30 mL, Oral, QID PRN    dextrose 50%, 12.5 g, Intravenous, PRN    dextrose 50%, 25 g, Intravenous, PRN    glucagon (human recombinant), 1 mg, Intramuscular, PRN    glucose, 16 g, Oral, PRN    glucose, 24 g, Oral, PRN    insulin aspart U-100, 0-5 Units, Subcutaneous, QID (AC + HS) PRN    naloxone, 0.02 mg, Intravenous, PRN    ondansetron, 4 mg, Intravenous, Q8H PRN    oxyCODONE-acetaminophen, 1 tablet, Oral, Q4H PRN    polyethylene glycol, 17 g, Oral, Daily PRN    promethazine, 25 mg, Oral, Q6H PRN    simethicone, 1 tablet, Oral, QID PRN    sodium chloride 0.9%, 10 mL, Intravenous, PRN    sodium chloride 0.9%, 10 mL, Intravenous, PRN    sodium chloride 0.9%, 3 mL, Intravenous, Q12H PRN    zolpidem, 5 mg, Oral, Nightly PRN     Review of patient's allergies indicates:  No Known Allergies     Past Medical  History:   Diagnosis Date    Arthritis     hands    Bilateral bunions     Borderline glaucoma     De Quervain's disease (radial styloid tenosynovitis)     Gastritis     upper GI 2017    Hydradenitis     Hyperlipidemia     Hypertension     Insomnia     Lung cancer     Migraines 2000    Nasal septum perforation     Obesity     Pneumonia     Restrictive airway disease     Sleep apnea     SVT (supraventricular tachycardia) 2013    Trigger finger     Type 2 diabetes mellitus      am 2024     Past Surgical History:   Procedure Laterality Date    AXILLARY HIDRADENITIS EXCISION Bilateral     BONE EXOSTOSIS EXCISION Right 2018    Procedure: EXCISION, EXOSTOSIS;  Surgeon: Jayro Pedraza Sr., MD;  Location: [facility] OR;  Service: Orthopedics;  Laterality: Right;    BREAST BIOPSY Bilateral     both benign    BREAST SURGERY  1998    BRONCHOSCOPY Bilateral 1/15/2025    Procedure: Bronchoscopy - insert lighted tube into airway to take a biopsy of lung;  Surgeon: Adrian Robin MD;  Location: [facility] ENDO;  Service: Pulmonary;  Laterality: Bilateral;    CARPAL TUNNEL RELEASE      bilateral    CARPAL TUNNEL RELEASE Right 2024    Procedure: RELEASE, CARPAL TUNNEL;  Surgeon: Jaime Oswald MD;  Location: [facility] OR;  Service: Orthopedics;  Laterality: Right;    CARPAL TUNNEL RELEASE Left 2024    Procedure: RELEASE, CARPAL TUNNEL;  Surgeon: Jaime Oswald MD;  Location: [facility] OR;  Service: Orthopedics;  Laterality: Left;    CATARACT EXTRACTION Bilateral     OU     SECTION  1979    CHOLECYSTECTOMY  2014    COLONOSCOPY N/A 10/02/2020    Procedure: COLONOSCOPY;  Surgeon: Tushar Edwards MD;  Location: [facility] ENDO;  Service: Endoscopy;  Laterality: N/A;    COLONOSCOPY W/ POLYPECTOMY  10/02/2020    Polyps x3, repeat 5 years; Tushar Edwards MD     CYST REMOVAL  2015    sebaceous cyst removed from face    DE QUERVAIN'S RELEASE Left 2020    Procedure: RELEASE,  HAND, FOR DEQUERVAIN'S TENOSYNOVITIS;  Surgeon: Jaime Oswald MD;  Location: [facility] OR;  Service: Orthopedics;  Laterality: Left;    DE QUERVAIN'S RELEASE Right 11/20/2020    Procedure: RELEASE, HAND, FOR DEQUERVAIN'S TENOSYNOVITIS;  Surgeon: Jaime Oswald MD;  Location: [facility];  Service: Orthopedics;  Laterality: Right;    ENDOBRONCHIAL ULTRASOUND Bilateral 04/10/2024    Procedure: ENDOBRONCHIAL ULTRASOUND (EBUS);  Surgeon: Adrian Robin MD;  Location: [facility];  Service: Pulmonary;  Laterality: Bilateral;    ENDOBRONCHIAL ULTRASOUND Bilateral 1/15/2025    Procedure: ENDOBRONCHIAL ULTRASOUND (EBUS);  Surgeon: Adrian Robin MD;  Location: [facility];  Service: Pulmonary;  Laterality: Bilateral;    EYE SURGERY      gastric sleeve  02/13/2017    Dr. Watson    INJECTION OF ANESTHETIC AGENT AROUND MULTIPLE INTERCOSTAL NERVES  5/10/2024    Procedure: BLOCK, NERVE, INTERCOSTAL, 2 OR MORE;  Surgeon: Mike Nuñez MD;  Location: [facility];  Service: Cardiothoracic;;    KNEE SURGERY Right     OLECRANON BURSECTOMY Right 07/25/2018    Procedure: BURSECTOMY, OLECRANON;  Surgeon: Jayro Pedraza Sr., MD;  Location: [facility];  Service: Orthopedics;  Laterality: Right;    SURGICAL REMOVAL OF BUNION WITH OSTEOTOMY OF METATARSAL BONE Left 05/10/2019    Procedure: BUNIONECTOMY, WITH METATARSAL OSTEOTOMY;  Surgeon: Srinivasan Villanueva DPM;  Location: [facility];  Service: Podiatry;  Laterality: Left;    SURGICAL REMOVAL OF BUNION WITH OSTEOTOMY OF METATARSAL BONE Right 06/28/2019    Procedure: BUNIONECTOMY, WITH METATARSAL OSTEOTOMY;  Surgeon: Srinivasan Villanueva DPM;  Location: [facility] OR;  Service: Podiatry;  Laterality: Right;    SURGICAL REMOVAL OF LYMPH NODE Left 5/10/2024    Procedure: EXCISION, LYMPH NODE;  Surgeon: Mike Nuñez MD;  Location: [facility];  Service: Cardiothoracic;  Laterality: Left;    TONSILLECTOMY, ADENOIDECTOMY  1980s    TRANSESOPHAGEAL ECHOCARDIOGRAM WITH POSSIBLE CARDIOVERSION (LAKESHIA W/  POSS CARDIOVERSION) N/A 5/15/2024    Procedure: Transesophageal echo (LAKESHIA) intra-procedure log documentation;  Surgeon: Twan Pelaez MD;  Location: [facility] CATH LAB;  Service: Cardiology;  Laterality: N/A;    TRIGGER FINGER RELEASE Right 2015    Dr. Milo HALL ROBOTIC RATS,WITH LOBECTOMY,LUNG Left 5/10/2024    Procedure: XI ROBOTIC RATS,WITH LOBECTOMY,LUNG;  Surgeon: Mike Nuñez MD;  Location: [facility] OR;  Service: Cardiothoracic;  Laterality: Left;  Lingulectomy     Family History       Problem Relation (Age of Onset)    Diabetes Maternal Aunt, Cousin    Hypertension Maternal Grandmother    Prostate cancer Brother          Tobacco Use    Smoking status: Former     Current packs/day: 0.00     Average packs/day: 0.5 packs/day for 42.0 years (21.0 ttl pk-yrs)     Types: Cigarettes     Start date: 1970     Quit date: 2012     Years since quittin.2     Passive exposure: Past    Smokeless tobacco: Never   Substance and Sexual Activity    Alcohol use: Not Currently     Alcohol/week: 1.0 standard drink of alcohol     Types: 1 Glasses of wine per week     Comment: Glass red wine once a every 2 weeks    Drug use: Never    Sexual activity: Not Currently     Partners: Female     Birth control/protection: Abstinence, None       Review of Systems   Constitutional:  Positive for fatigue. Negative for activity change, appetite change, chills, diaphoresis, fever and unexpected weight change.   HENT:  Negative for congestion, dental problem, drooling, ear discharge, ear pain, facial swelling, hearing loss, mouth sores, nosebleeds, postnasal drip, rhinorrhea, sinus pressure, sneezing, sore throat, tinnitus, trouble swallowing and voice change.    Eyes:  Negative for photophobia, pain, discharge, redness, itching and visual disturbance.   Respiratory:  Negative for cough, choking, chest tightness, shortness of breath, wheezing and stridor.    Cardiovascular:  Negative for chest pain, palpitations and leg swelling.    Gastrointestinal:  Negative for abdominal distention, abdominal pain, anal bleeding, blood in stool, constipation, diarrhea, nausea, rectal pain and vomiting.   Endocrine: Negative for cold intolerance, heat intolerance, polydipsia, polyphagia and polyuria.   Genitourinary:  Negative for decreased urine volume, difficulty urinating, dyspareunia, dysuria, enuresis, flank pain, frequency, genital sores, hematuria, menstrual problem, pelvic pain, urgency, vaginal bleeding, vaginal discharge and vaginal pain.   Musculoskeletal:  Negative for arthralgias, back pain, gait problem, joint swelling, myalgias, neck pain and neck stiffness.   Skin:  Negative for color change, pallor and rash.   Allergic/Immunologic: Negative for environmental allergies, food allergies and immunocompromised state.   Neurological:  Positive for weakness. Negative for dizziness, tremors, seizures, syncope, facial asymmetry, speech difficulty, light-headedness, numbness and headaches.   Hematological:  Negative for adenopathy. Does not bruise/bleed easily.   Psychiatric/Behavioral:  Negative for agitation, behavioral problems, confusion, decreased concentration, dysphoric mood, hallucinations, self-injury, sleep disturbance and suicidal ideas. The patient is not nervous/anxious and is not hyperactive.      Objective:     Vital Signs (Most Recent):  Temp: 99.5 °F (37.5 °C) (04/01/25 1202)  Pulse: 103 (04/02/25 0600)  Resp: 20 (04/02/25 0600)  BP: 123/66 (04/02/25 0600)  SpO2: (!) 93 % (04/02/25 0600) Vital Signs (24h Range):  Temp:  [98.5 °F (36.9 °C)-99.5 °F (37.5 °C)] 99.5 °F (37.5 °C)  Pulse:  [] 103  Resp:  [16-20] 20  SpO2:  [91 %-99 %] 93 %  BP: ()/(50-75) 123/66     Weight: 70.5 kg (155 lb 6.8 oz)  Body mass index is 38.9 kg/m².  Body surface area is 1.62 meters squared.      Intake/Output Summary (Last 24 hours) at 4/2/2025 0678  Last data filed at 4/1/2025 2114  Gross per 24 hour   Intake 99.52 ml   Output --   Net 99.52 ml         Physical Exam  Vitals reviewed.   Constitutional:       General: She is not in acute distress.     Appearance: She is well-developed. She is not diaphoretic.   HENT:      Head: Normocephalic and atraumatic.      Right Ear: External ear normal.      Left Ear: External ear normal.      Nose: Nose normal.      Right Sinus: No maxillary sinus tenderness or frontal sinus tenderness.      Left Sinus: No maxillary sinus tenderness or frontal sinus tenderness.      Mouth/Throat:      Pharynx: No oropharyngeal exudate.   Eyes:      General: Lids are normal. No scleral icterus.        Right eye: No discharge.         Left eye: No discharge.      Conjunctiva/sclera: Conjunctivae normal.      Right eye: Right conjunctiva is not injected. No hemorrhage.     Left eye: Left conjunctiva is not injected. No hemorrhage.     Pupils: Pupils are equal, round, and reactive to light.   Neck:      Thyroid: No thyromegaly.      Vascular: No JVD.      Trachea: No tracheal deviation.   Cardiovascular:      Rate and Rhythm: Normal rate.   Pulmonary:      Effort: Pulmonary effort is normal. No respiratory distress.      Breath sounds: No stridor.   Chest:      Chest wall: No tenderness.   Abdominal:      General: Bowel sounds are normal. There is no distension.      Palpations: Abdomen is soft. There is no hepatomegaly, splenomegaly or mass.      Tenderness: There is no abdominal tenderness. There is no rebound.   Musculoskeletal:         General: No tenderness. Normal range of motion.      Cervical back: Normal range of motion and neck supple.   Lymphadenopathy:      Cervical: No cervical adenopathy.      Upper Body:      Right upper body: No supraclavicular adenopathy.      Left upper body: No supraclavicular adenopathy.   Skin:     General: Skin is dry.      Findings: No erythema or rash.   Neurological:      Mental Status: She is alert and oriented to person, place, and time.      Cranial Nerves: No cranial nerve deficit.       "Coordination: Coordination normal.   Psychiatric:         Behavior: Behavior normal.         Thought Content: Thought content normal.         Judgment: Judgment normal.          Significant Labs:   BMP:   Recent Labs   Lab 04/01/25 1249 04/02/25 0435   * 134*   K 4.2 3.7    105   CO2 21* 21*   BUN 13 7*   CREATININE 1.0 0.5   CALCIUM 9.1 9.0   MG 1.6  --    , CBC:   Recent Labs   Lab 04/01/25 1249 04/02/25 0435   WBC 2.10* 2.70*   HGB 12.3 11.9*   HCT 36.6* 34.9*   * 83*   , CMP:   Recent Labs   Lab 04/01/25 1249 04/02/25 0435   * 134*   K 4.2 3.7    105   CO2 21* 21*   BUN 13 7*   CREATININE 1.0 0.5   CALCIUM 9.1 9.0   ALBUMIN 3.1* 2.7*   BILITOT 0.9 0.7   ALKPHOS 115 101   * 59*   * 103*   ANIONGAP 12 8   , Coagulation: No results for input(s): "PT", "INR", "APTT" in the last 48 hours., Haptoglobin: No results for input(s): "HAPTOGLOBIN" in the last 48 hours., Immunology: No results for input(s): "SPEP", "LORNA", "DEBORAH", "FREELAMBDALI" in the last 48 hours., LDH: No results for input(s): "LDHCSF", "BFSOURCE" in the last 48 hours., LFTs:   Recent Labs   Lab 04/01/25 1249 04/02/25 0435   * 103*   * 59*   ALKPHOS 115 101   BILITOT 0.9 0.7   ALBUMIN 3.1* 2.7*   , Reticulocytes: No results for input(s): "RETIC" in the last 48 hours., Tumor Markers: No results for input(s): "PSA", "CEA", "", "AFPTM", "[id number]", "" in the last 48 hours.    Invalid input(s): "ALGTM", Uric Acid No results for input(s): "URICACID" in the last 48 hours., and Urine Studies:   Recent Labs   Lab 04/01/25  1539   COLORU Yellow   APPEARANCEUA Clear   PHUR 6.0   SPECGRAV 1.015   PROTEINUA Trace*   GLUCUA Negative   BILIRUBINUA Negative   OCCULTUA Trace*   NITRITE Negative   UROBILINOGEN 2.0-3.0*   LEUKOCYTESUR Negative       Diagnostic Results:  I have reviewed all pertinent imaging results/findings within the past 24 hours.  Assessment/Plan:     Hypotension  Patient is " admitted after being seen by radiation oncology and found to be hypotensive.  Agree with evaluation in emergency room for potential sepsis    Multifocal pneumonia  Multifocal pneumonia.  Agree with current plan course of treatment non neutropenic    Malignant neoplasm of lower lobe of left lung  Patient with locally advanced non-small cell lung carcinoma completed carboplatin Taxol.  Patient is now admitted to the hospital with multifocal pneumonia non neutropenic.  Agree with current plan course of action for treatment once patient is medically stabilized will reassess in the outpatient setting for continuation of treatment with maintenance immunotherapy.  She has NOT received immunotherapy at the present time  04/03/2025.  Patient appears to be improving overall sense of well-being less cough shortness of breath feels much stronger variant if patient is discharged will continue follow-up in outpatient setting as previously scheduled        Thank you for your consult. I will follow-up with patient. Please contact us if you have any additional questions.     Emerson Moran MD  Hematology/Oncology  [facility] - Med Surg

## 2025-04-03 NOTE — PLAN OF CARE
Plan of care reviewed with with patient with verbal understanding. Chart and orders reviewed. Patient remains free from falls this shift, safety precautions in place. Patient resting comfortably in bed. Pain controlled with as needed pain medication. Purposeful rounding with bed in lowest position, side rails up, call light and personal items within reach. Heart monitor in place. Blood glucose monitoring. Will continue to monitor until the end of shift. No needs at this time. Pt refused midnight vitals.    Problem: Adult Inpatient Plan of Care  Goal: Plan of Care Review  Outcome: Progressing  Goal: Patient-Specific Goal (Individualized)  Outcome: Progressing  Goal: Absence of Hospital-Acquired Illness or Injury  Outcome: Progressing  Goal: Optimal Comfort and Wellbeing  Outcome: Progressing  Goal: Readiness for Transition of Care  Outcome: Progressing     Problem: Diabetes Comorbidity  Goal: Blood Glucose Level Within Targeted Range  Outcome: Progressing     Problem: Sepsis/Septic Shock  Goal: Optimal Coping  Outcome: Progressing  Goal: Absence of Bleeding  Outcome: Progressing  Goal: Blood Glucose Level Within Targeted Range  Outcome: Progressing  Goal: Absence of Infection Signs and Symptoms  Outcome: Progressing  Goal: Optimal Nutrition Intake  Outcome: Progressing     Problem: Pneumonia  Goal: Fluid Balance  Outcome: Progressing  Goal: Resolution of Infection Signs and Symptoms  Outcome: Progressing  Goal: Effective Oxygenation and Ventilation  Outcome: Progressing     Problem: Wound  Goal: Optimal Coping  Outcome: Progressing  Goal: Optimal Functional Ability  Outcome: Progressing  Goal: Absence of Infection Signs and Symptoms  Outcome: Progressing  Goal: Improved Oral Intake  Outcome: Progressing  Goal: Optimal Pain Control and Function  Outcome: Progressing  Goal: Skin Health and Integrity  Outcome: Progressing  Goal: Optimal Wound Healing  Outcome: Progressing     Problem: Neutropenia  Goal: Absence of  Infection  Outcome: Progressing

## 2025-04-04 ENCOUNTER — DOCUMENTATION ONLY (OUTPATIENT)
Dept: RADIATION ONCOLOGY | Facility: CLINIC | Age: 75
End: 2025-04-04
Payer: MEDICARE

## 2025-04-04 ENCOUNTER — TELEPHONE (OUTPATIENT)
Dept: INTERNAL MEDICINE | Facility: CLINIC | Age: 75
End: 2025-04-04
Payer: MEDICARE

## 2025-04-04 NOTE — TELEPHONE ENCOUNTER
----- Message from [employer] sent at 4/4/2025  1:50 PM CDT -----  .Type: Patient Call Back Who called: Ness with [facility] What is the request in detail:  calling concerning records stating patient is diabetic and recommend a statin medication therapy  Can the clinic reply by [facility]?   Would the patient rather a call back or a response via [facility]? call Best call back number:

## 2025-04-06 NOTE — DISCHARGE SUMMARY
[facility] Medicine  Discharge Summary      Patient Name: Cassandra Campos  MRN: [id number]  DANIEL: [id number]  Patient Class: IP- Inpatient  Admission Date: 4/1/2025  Hospital Length of Stay: 2 days  Discharge Date and Time: 04/06/2025 4:30 PM  Attending Physician: No att. providers found   Discharging Provider: Win Pace MD  Primary Care Provider: Emil Zhang MD    Primary Care Team: Networked reference to record PCT     HPI:   Cassandra Campos is a 74 y.o. female with a PMH  has a past medical history of Arthritis, Bilateral bunions, Borderline glaucoma, De Quervain's disease (radial styloid tenosynovitis), Gastritis, Hydradenitis, Hyperlipidemia, Hypertension, Insomnia, Lung cancer, Migraines (02/01/2000), Nasal septum perforation, Obesity, Pneumonia, Restrictive airway disease, Sleep apnea, SVT (supraventricular tachycardia) (09/2013), Trigger finger, and Type 2 diabetes mellitus (2012).  Presented to the ER for evaluation for low blood pressure.  Patient was sent over from Dr. Zhang's office where [facility], but her session was held due to her low BP reading in the office.  Patient reports she felt more fatigued with shortness of breath mild chest pain, productive cough, and lightheadedness/dizziness which started yesterday.  Denied any specific alleviating or aggravating factors.  Denies any fevers, aches, chills, sweats, nausea, vomiting, diarrhea, abdominal pain, or any other symptoms at this time.    ER workup revealed CBC to be at baseline.  CMP revealed CBG of 154 mg/dL AST/ALT of 177/163.  Troponin 0.276.  Procalcitonin level 6.41.  Lactic acid level 5.1 with repeat 2.3.  Flu/COVID negative.  UA unremarkable.  Blood cultures pending.  Chest x-ray showed no acute infiltrates.  CT chest abdomen and pelvis with IV contrast revealed multifocal ground-glass opacities to left lung most prominent in the lower lobe  infectious versus inflammatory process.  EKG revealed sinus tachycardia with a ventricular rate of 110 beats per minute QT/QTC of 342/462.  Patient received 2 g of cefepime and 1,182 cc normal saline in the ER.  Hospital Medicine consulted to admit patient for sepsis likely secondary to multifocal pneumonia.  Patient in agreement with treatment plan.  Patient admitted under inpatient status.    PCP: Emil Zhang    * No surgery found *      Hospital Course:   The patient presented with weakness and hypotension. She was placed on fluids and empiric antibiotics. Oncology was consulted as she recently completed round of chemotherapy. Her symptoms improved. She continued radiation while inpatient. She was transitioned to oral antibiotics upon discharge. OP follow-up with oncology and rad-onc as previously scheduled. Patient seen and examined, stable for discharge.      Goals of Care Treatment Preferences:  Code Status: Full Code          What is most important right now is to focus on remaining as independent as possible, curative/life-prolongation (regardless of treatment burdens).  Accordingly, we have decided that the best plan to meet the patient's goals includes continuing with treatment.      Consults:     Final Active Diagnoses:    Diagnosis Date Noted POA    PRINCIPAL PROBLEM:  Sepsis [A41.9] 04/02/2025 Yes    Multifocal pneumonia [J18.9] 04/02/2025 Yes    Hypotension [I95.9] 04/02/2025 Yes    Malignant neoplasm of lower lobe of left lung [C34.32] 04/10/2024 Yes    Major depressive disorder, single episode, mild [F32.0] 02/13/2024 Yes    Anxiety [F41.9] 03/13/2023 Yes    Controlled type 2 diabetes mellitus without complication, without long-term current use of insulin [E11.9] 03/06/2019 Yes    Primary hypertension [I10] 05/30/2017 Yes    GERD (gastroesophageal reflux disease) [K21.9] 02/08/2017 Yes     Chronic    Paroxysmal SVT (supraventricular tachycardia) [I47.10] 01/18/2017 Yes    Hyperlipidemia  associated with type 2 diabetes mellitus [E11.69, E78.5]  Yes     Chronic    Insomnia [G47.00]  Yes     Chronic      Problems Resolved During this Admission:       Discharged Condition: stable    Disposition: Home or Self Care    Follow Up:   Follow-up Information       Emil Zhang MD Follow up.    Specialty: Family Medicine  Contact information:  [address] [facility] [address] [phone]                           Patient Instructions:      Diet diabetic     Activity as tolerated       Significant Diagnostic Studies: Radiology:   Imaging Results              CT Chest Abdomen Pelvis With IV Contrast (XPD) Routine Oral Contrast (Final result)  Result time 04/01/25 18:16:29      Final result by Raymond Boggs DO (04/01/25 18:16:29)                   Impression:      1.  Multifocal groundglass opacities throughout the left lung most pronounced in the lower lobe, infectious or inflammatory.  2.  Otherwise, no additional acute findings.    All CT scans at [this location] are performed using dose modulation techniques as appropriate to a performed exam including the following:  Automated exposure control; adjustment of the mA and/or kV according to patient size (this includes techniques or standardized protocols for targeted exams where dose is matched to indication / reason for exam; i.e. extremities or head); use of iterative reconstruction technique.    Finalized on: 4/1/2025 6:16 PM By:  Raymond Boggs  [facility]# [id number]      2025-04-01 18:18:33.030     [facility]               Narrative:    EXAM: CT CHEST ABDOMEN PELVIS WITH IV CONTRAST (XPD)    CLINICAL HISTORY: Sepsis    COMPARISON: 12/11/2024    TECHNIQUE: Standard thin-section axial images, with reformatted sagittal and coronal images.    FINDINGS: Chest: There is no mediastinal, hilar or axillary lymphadenopathy.  No pericardial effusion.  Moderate severe coronary artery calcification.    Multifocal groundglass opacities throughout the left  lung most pronounced in the lower lobe.  Additional linear bands of atelectasis in the left upper lobe posteriorly extending to the major fissure.    Minimal atelectasis at the right lung base.  No pleural effusions.  No pulmonary nodules.    ABDOMEN: Cholecystectomy.  Liver, spleen, pancreas, adrenals, kidneys and ureters are unremarkable.    Vascular structures are unremarkable with the exception of atherosclerotic calcification.    No abdominal or retroperitoneal lymphadenopathy.  No ascites or fat stranding within the abdomen.  No dilatation of the large or small bowel.  No evidence for appendicitis.  Postsurgical changes at the stomach.    Pelvis: Small amount of air within the bladder.  No pelvic free fluid, iliac chain or inguinal lymphadenopathy.  Atrophic uterus and adnexal regions are unremarkable.    Osseous structures: Small fat-containing anterior abdominal wall defect just below the umbilicus without evidence for incarceration.  No concerning lytic or sclerotic bony lesions.                                         X-Ray Chest AP Portable (Final result)  Result time 04/01/25 12:44:59      Final result by [facility] MD (04/01/25 12:44:59)                   Impression:      No acute infiltrates.      Electronically signed by: Juliette Prakash MD  Date:    04/01/2025  Time:    12:44               Narrative:    EXAMINATION:  XR CHEST AP PORTABLE    CLINICAL HISTORY:  , Sepsis;    COMPARISON:  Chest, 02/27/2025    FINDINGS:  One view.  Stable heart size.  Discoid atelectasis mid left lung.  No definite infiltrates.                                        Pending Diagnostic Studies:       None           Medications:  Reconciled Home Medications:      Medication List        START taking these medications      cefdinir 300 MG capsule  Commonly known as: OMNICEF  Take 1 capsule (300 mg total) by mouth 2 (two) times daily. for 10 days     doxycycline 100 MG tablet  Commonly known as:  VIBRA-TABS  Take 1 tablet (100 mg total) by mouth 2 (two) times daily.            CONTINUE taking these medications      * ACCU-CHEK GUIDE ME GLUCOSE MTR Misc  Generic drug: blood-glucose meter  use to check blood glucose 2 times a day     * blood-glucose meter Misc  Commonly known as: ACCU-CHEK GUIDE ME GLUCOSE MTR  To check blood glucose 1-2 times daily, to use with insurance preferred meter     ACCU-CHEK GUIDE TEST STRIPS Strp  Generic drug: blood sugar diagnostic  use to test blood sugar 1-2 times a day     ACCU-CHEK SOFTCLIX LANCETS Misc  Generic drug: lancets  Use to test blood glucose 1-2 times a day, discard lancet after each use     albuterol 90 mcg/actuation inhaler  Commonly known as: PROVENTIL/VENTOLIN HFA  INHALE 2 PUFFS INTO THE LUNGS EVERY 4 HOURS AS NEEDED FOR WHEEZING OR SHORTNESS OF BREATH.     amitriptyline 50 MG tablet  Commonly known as: ELAVIL  Take 1 tablet (50 mg total) by mouth every evening.     aspirin 81 MG EC tablet  Commonly known as: ECOTRIN  Take 1 tablet (81 mg total) by mouth once daily.     * diphenhydrAMINE-aluminum-magnesium hydroxide-simethicone-LIDOcaine viscous HCl 2%  Swish and spit 15 mLs every 4 (four) hours as needed.     * magic mouthwash diphen/antac/lidoc  swish and spit 15ml every 4 hours as needed     eszopiclone 3 mg Tab  Commonly known as: LUNESTA  Take 1 tablet (3 mg total) by mouth every evening.     LORazepam 1 MG tablet  Commonly known as: ATIVAN  Take 1 tablet (1 mg total) by mouth 2 (two) times daily.     losartan 25 MG tablet  Commonly known as: COZAAR  Take 1 tablet (25 mg total) by mouth once daily.     metoprolol succinate 50 MG 24 hr tablet  Commonly known as: TOPROL-XL  Take 1 tablet (50 mg total) by mouth once daily.     OLANZapine 5 MG tablet  Commonly known as: ZyPREXA  Take 1 tablet (5 mg total) by mouth every evening. On days 1-3 of each chemotherapy cycle.     omeprazole 20 MG capsule  Commonly known as: PRILOSEC  Take 1 capsule (20 mg total) by  mouth once daily.     oxyCODONE-acetaminophen  mg per tablet  Commonly known as: PERCOCET  Take 1 tablet by mouth every 4 (four) hours as needed for Pain.     OZEMPIC 2 mg/dose (8 mg/3 mL) Pnij  Generic drug: semaglutide  Inject 2 mg into the skin every 7 days.     prochlorperazine 5 MG tablet  Commonly known as: COMPAZINE  Take 1 tablet (5 mg total) by mouth every 6 (six) hours as needed for Nausea.     * traMADoL 50 mg tablet  Commonly known as: ULTRAM  Take 1 tablet (50 mg total) by mouth 2 (two) times a day.     * traMADoL 50 mg tablet  Commonly known as: ULTRAM  Take 1 tablet (50 mg total) by mouth every 12 (twelve) hours as needed for Pain. Greater than 7 day supply medically necessary.     triamcinolone acetonide 0.025 % Lotn  Apply small amount to affected areas twice a day.  Take cool showers or baths           * This list has 6 medication(s) that are the same as other medications prescribed for you. Read the directions carefully, and ask your doctor or other care provider to review them with you.                  Indwelling Lines/Drains at time of discharge:   Lines/Drains/Airways       None                   Time spent on the discharge of patient: 31 minutes         Win Pace MD  Department of Hospital Medicine  [facility] Med [facility]

## 2025-04-06 NOTE — HOSPITAL COURSE
The patient presented with weakness and hypotension. She was placed on fluids and empiric antibiotics. Oncology was consulted as she recently completed round of chemotherapy. Her symptoms improved. She continued radiation while inpatient. She was transitioned to oral antibiotics upon discharge. OP follow-up with oncology and rad-onc as previously scheduled. Patient seen and examined, stable for discharge.

## 2025-04-07 LAB
BACTERIA BLD CULT: NORMAL
BACTERIA BLD CULT: NORMAL

## 2025-04-08 ENCOUNTER — TELEPHONE (OUTPATIENT)
Dept: INTERNAL MEDICINE | Facility: CLINIC | Age: 75
End: 2025-04-08

## 2025-04-08 ENCOUNTER — OFFICE VISIT (OUTPATIENT)
Dept: INTERNAL MEDICINE | Facility: CLINIC | Age: 75
End: 2025-04-08
Payer: MEDICARE

## 2025-04-08 VITALS
SYSTOLIC BLOOD PRESSURE: 118 MMHG | WEIGHT: 140 LBS | DIASTOLIC BLOOD PRESSURE: 74 MMHG | BODY MASS INDEX: 25.76 KG/M2 | HEART RATE: 117 BPM | RESPIRATION RATE: 16 BRPM | OXYGEN SATURATION: 98 % | HEIGHT: 62 IN

## 2025-04-08 DIAGNOSIS — E11.65 TYPE 2 DIABETES MELLITUS WITH HYPERGLYCEMIA, WITHOUT LONG-TERM CURRENT USE OF INSULIN: ICD-10-CM

## 2025-04-08 DIAGNOSIS — I47.10 PAROXYSMAL SVT (SUPRAVENTRICULAR TACHYCARDIA): ICD-10-CM

## 2025-04-08 DIAGNOSIS — J18.9 MULTIFOCAL PNEUMONIA: ICD-10-CM

## 2025-04-08 DIAGNOSIS — C34.32 MALIGNANT NEOPLASM OF LOWER LOBE OF LEFT LUNG: ICD-10-CM

## 2025-04-08 DIAGNOSIS — R40.0 SOMNOLENCE: ICD-10-CM

## 2025-04-08 DIAGNOSIS — F32.0 MAJOR DEPRESSIVE DISORDER, SINGLE EPISODE, MILD: ICD-10-CM

## 2025-04-08 DIAGNOSIS — I10 PRIMARY HYPERTENSION: Primary | ICD-10-CM

## 2025-04-08 DIAGNOSIS — R74.8 ELEVATED LIVER ENZYMES: ICD-10-CM

## 2025-04-08 DIAGNOSIS — F41.9 ANXIETY: ICD-10-CM

## 2025-04-08 PROCEDURE — 99999 PR PBB SHADOW E&M-EST. PATIENT-LVL V: CPT | Mod: PBBFAC,,, | Performed by: PHYSICIAN ASSISTANT

## 2025-04-08 RX ORDER — AMITRIPTYLINE HYDROCHLORIDE 25 MG/1
25 TABLET, FILM COATED ORAL NIGHTLY
Qty: 90 TABLET | Refills: 1 | Status: SHIPPED | OUTPATIENT
Start: 2025-04-08

## 2025-04-08 NOTE — Clinical Note
"Please add hospital f/u/chest pain to upcoming Card visit note   Please schedule f/u w Dr. Zhang, soonest available with visit notes "polypharmacy/excessive sleeping/somnolence" "

## 2025-04-08 NOTE — PROGRESS NOTES
"Subjective:      Patient ID: Cassandra Campos is a 74 y.o. female.    Chief Complaint: Follow-up (She is here for a hospital follow up. Went to the ED for evaluation on 4/1 and was admitted until 4/3. Diagnosed with pneumonia and states still having chest pains (feels like indigestion). Chest pain is affecting her eating and drinking. )    Patient is known to me, being seen today for hospital follow up.     Admitted to [facility] on 4/1 for low BP, SOB and chest pain w productive cough and dizziness.    "CBC to be at baseline. CMP revealed CBG of 154 mg/dL AST/ALT of 177/163. Troponin 0.276. Procalcitonin level 6.41. Lactic acid level 5.1 with repeat 2.3. Flu/COVID negative. UA unremarkable. Blood cultures pending. Chest x-ray showed no acute infiltrates. CT chest abdomen and pelvis with IV contrast revealed multifocal ground-glass opacities to left lung most prominent in the lower lobe infectious versus inflammatory process. EKG revealed sinus tachycardia with a ventricular rate of 110 beats per minute QT/QTC of 342/462. Patient received 2 g of cefepime and 1,182 cc normal saline in the ER. Hospital Medicine consulted to admit patient for sepsis likely secondary to multifocal pneumonia. Patient in agreement with treatment plan. Patient admitted under inpatient status."  Discharged 4/3 with omnicef 300mg bid x10 days and doxy 100mg bid x36vfes   On discharged liver labs remain elevated but trending down, stable pancytopenia, repeat troponin trending down   Final blood cultures negative     HTN- losartan 25mg, toprol 50mg   DM- A1c 5.7%, ozempic 2mg     Compliant w antibiotics   Since discharge reports ongoing chest pain   Describes as indigestion, burning in chest any time she eats/drinks   Reports discomfort even drinking water   She takes omeprazole 20mg daily     Does not currently have HH    Sister reports patient sleeps the majority of the day (22hrs/day)   Multiple sedative meds: ativan, amitriptyline, " "lunesta  Takes percocet daily, tramadol prn   H/o anxiety, but denies recent panic attacks, denies depression currently     LV April 2025 w PCP.      Review of Systems   Constitutional:  Negative for chills, diaphoresis and fever.   HENT:  Negative for congestion, rhinorrhea and sore throat.    Respiratory:  Positive for cough (productive). Negative for shortness of breath and wheezing.    Cardiovascular:  Positive for chest pain (assc w eating). Negative for palpitations and leg swelling.   Gastrointestinal:  Positive for nausea and vomiting (spitting up). Negative for abdominal pain, constipation and diarrhea.   Skin:  Negative for rash.   Neurological:  Negative for dizziness, light-headedness and headaches.       Objective:   /74 (BP Location: Left arm, Patient Position: Sitting)   Pulse (!) 117   Resp 16   Ht 5' 2" (1.575 m)   Wt 63.5 kg (139 lb 15.9 oz)   SpO2 98%   BMI 25.60 kg/m²   Physical Exam  Constitutional:       General: She is not in acute distress.     Appearance: Normal appearance. She is well-developed. She is not ill-appearing.   HENT:      Head: Normocephalic and atraumatic.   Cardiovascular:      Rate and Rhythm: Regular rhythm. Tachycardia present.      Heart sounds: Normal heart sounds. No murmur heard.  Pulmonary:      Effort: Pulmonary effort is normal. No respiratory distress.      Breath sounds: Normal breath sounds. No decreased breath sounds.   Musculoskeletal:      Right lower leg: No edema.      Left lower leg: No edema.   Skin:     General: Skin is warm and dry.      Findings: No rash.   Psychiatric:         Speech: Speech normal.         Behavior: Behavior normal.         Thought Content: Thought content normal.       Assessment:      1. Primary hypertension    2. Multifocal pneumonia    3. Malignant neoplasm of lower lobe of left lung    4. Type 2 diabetes mellitus with hyperglycemia, without long-term current use of insulin    5. Elevated liver enzymes    6. Paroxysmal " SVT (supraventricular tachycardia)    7. Anxiety    8. Somnolence    9. Major depressive disorder, single episode, mild       Plan:   Primary hypertension  -     Lipid Panel; Future; Expected date: 10/08/2025    Multifocal pneumonia    Malignant neoplasm of lower lobe of left lung    Type 2 diabetes mellitus with hyperglycemia, without long-term current use of insulin  -     Hemoglobin A1C; Future; Expected date: 07/08/2025    Elevated liver enzymes  -     Comprehensive Metabolic Panel; Future; Expected date: 04/08/2025    Paroxysmal SVT (supraventricular tachycardia)    Anxiety    Somnolence    Major depressive disorder, single episode, mild  -     amitriptyline (ELAVIL) 25 MG tablet; Take 1 tablet (25 mg total) by mouth every evening.  Dispense: 90 tablet; Refill: 1      Multiple sedative medications likely contributing to her somnolence and excessive sleeping  Will reduce amitriptyline to 25mg for time being with close f/u w PCP for further med review and adjustment      Increase omeprazole to 2 tablets (40mg) daily x1-2wks     Complete antibiotics as prescribed   Take antibiotics for entire course.  Consider yogurt or probiotic while on antibiotic to prevent GI upset.      Fasting labs 4/25 w Hem labs   Keep Hem appt later this month and Pulm appt early next month     Will see about sooner Card appt, pulse higher than it has been despite BB, also needs eval of elevated troponin and chest pain (though suspect GI in carola, EKG acceptable inpatient)    Discussed worsening signs/symptoms and when to return to clinic or go to ED.   Patient expresses understanding and agrees with treatment plan.     Transitional Care Note    Family and/or Caretaker present at visit?  Yes, sister.  Diagnostic tests reviewed/disposition: I have reviewed all completed as well as pending diagnostic tests at the time of discharge.  Disease/illness education: Discussed  Home health/community services discussion/referrals: Patient does not  have home health established from hospital visit.  They do not need home health.  If needed, we will set up home health for the patient.   Establishment or re-establishment of referral orders for community resources: No other necessary community resources.   Discussion with other health care providers: No discussion with other health care providers necessary.

## 2025-04-08 NOTE — TELEPHONE ENCOUNTER
Please advise patient we will reduce amitriptyline from 50 to 25mg for time being with close f/u w PCP for further med review and adjustment    A new prescription was sent to pharmacy or she can take 1/2 tablet of current Rx

## 2025-04-08 NOTE — PATIENT INSTRUCTIONS
Take antibiotics for entire course.  Consider yogurt or probiotic while on antibiotic to prevent GI upset.      Increase omeprazole (prilosec) to 2 tablets a day x1-2wks

## 2025-04-09 RX ORDER — TRIAMCINOLONE ACETONIDE 0.25 MG/ML
1 LOTION TOPICAL 2 TIMES DAILY
Qty: 60 ML | Refills: 1 | Status: SHIPPED | OUTPATIENT
Start: 2025-04-09

## 2025-04-09 NOTE — TELEPHONE ENCOUNTER
Refill Decision Note   Cassandra Campos  is requesting a refill authorization.    Brief Assessment and Rationale for Refill:   Approve       Medication Therapy Plan:  FLOS; Acute care/admission documentation reviewed. No change in therapy. Ok to approve.      Extended chart review required: Yes   Comments:     Note composed:12:20 PM 04/09/2025

## 2025-04-09 NOTE — TELEPHONE ENCOUNTER
Care Due:                  Date            Visit Type   Department     Provider  --------------------------------------------------------------------------------                                EP -                              PRIMARY      [facility] INTERNAL  Last Visit: 04-      CARE ([facility])   ANA Zhang                              EP -                              PRIMARY      [facility] INTERNAL  Next Visit: 07-      CARE ([facility])   ANA Zhang                                                            Last  Test          Frequency    Reason                     Performed    Due Date  --------------------------------------------------------------------------------    HBA1C.......  6 months...  semaglutide..............  08- 02-    [facility] Embedded Care Due Messages. Reference number: [id number].   4/09/2025 10:08:29 AM CDT

## 2025-04-11 ENCOUNTER — OFFICE VISIT (OUTPATIENT)
Dept: CARDIOLOGY | Facility: CLINIC | Age: 75
End: 2025-04-11
Payer: MEDICARE

## 2025-04-11 ENCOUNTER — HOSPITAL ENCOUNTER (OUTPATIENT)
Dept: CARDIOLOGY | Facility: HOSPITAL | Age: 75
Discharge: HOME | End: 2025-04-11
Payer: MEDICARE

## 2025-04-11 ENCOUNTER — PATIENT MESSAGE (OUTPATIENT)
Dept: RADIATION ONCOLOGY | Facility: CLINIC | Age: 75
End: 2025-04-11
Payer: MEDICARE

## 2025-04-11 VITALS
SYSTOLIC BLOOD PRESSURE: 98 MMHG | HEIGHT: 62 IN | HEART RATE: 110 BPM | BODY MASS INDEX: 25.66 KG/M2 | DIASTOLIC BLOOD PRESSURE: 64 MMHG | WEIGHT: 139.44 LBS

## 2025-04-11 DIAGNOSIS — I48.3 TYPICAL ATRIAL FLUTTER: ICD-10-CM

## 2025-04-11 DIAGNOSIS — R07.9 CHEST PAIN, UNSPECIFIED TYPE: ICD-10-CM

## 2025-04-11 DIAGNOSIS — R07.9 CHEST PAIN, UNSPECIFIED TYPE: Primary | ICD-10-CM

## 2025-04-11 DIAGNOSIS — E11.65 TYPE 2 DIABETES MELLITUS WITH HYPERGLYCEMIA, WITHOUT LONG-TERM CURRENT USE OF INSULIN: ICD-10-CM

## 2025-04-11 DIAGNOSIS — I47.10 PAROXYSMAL SVT (SUPRAVENTRICULAR TACHYCARDIA): ICD-10-CM

## 2025-04-11 DIAGNOSIS — I70.0 CALCIFICATION OF AORTA: ICD-10-CM

## 2025-04-11 DIAGNOSIS — I15.9 SECONDARY HYPERTENSION: ICD-10-CM

## 2025-04-11 DIAGNOSIS — T66.XXXA RADIATION ESOPHAGITIS: ICD-10-CM

## 2025-04-11 DIAGNOSIS — I10 PRIMARY HYPERTENSION: ICD-10-CM

## 2025-04-11 DIAGNOSIS — E78.5 HYPERLIPIDEMIA ASSOCIATED WITH TYPE 2 DIABETES MELLITUS: Primary | Chronic | ICD-10-CM

## 2025-04-11 DIAGNOSIS — E66.9 OBESITY, UNSPECIFIED CLASS, UNSPECIFIED OBESITY TYPE, UNSPECIFIED WHETHER SERIOUS COMORBIDITY PRESENT: Chronic | ICD-10-CM

## 2025-04-11 DIAGNOSIS — E11.69 HYPERLIPIDEMIA ASSOCIATED WITH TYPE 2 DIABETES MELLITUS: Primary | Chronic | ICD-10-CM

## 2025-04-11 DIAGNOSIS — E11.9 CONTROLLED TYPE 2 DIABETES MELLITUS WITHOUT COMPLICATION, WITHOUT LONG-TERM CURRENT USE OF INSULIN: ICD-10-CM

## 2025-04-11 DIAGNOSIS — K20.80 RADIATION ESOPHAGITIS: ICD-10-CM

## 2025-04-11 LAB
OHS QRS DURATION: 82 MS
OHS QTC CALCULATION: 440 MS

## 2025-04-11 PROCEDURE — 99999 PR PBB SHADOW E&M-EST. PATIENT-LVL IV: CPT | Mod: PBBFAC,,,

## 2025-04-11 PROCEDURE — 93010 ELECTROCARDIOGRAM REPORT: CPT | Mod: ,,, | Performed by: INTERNAL MEDICINE

## 2025-04-11 PROCEDURE — 93005 ELECTROCARDIOGRAM TRACING: CPT

## 2025-04-11 RX ORDER — METOPROLOL SUCCINATE 50 MG/1
75 TABLET, EXTENDED RELEASE ORAL DAILY
Qty: 135 TABLET | Refills: 2 | Status: SHIPPED | OUTPATIENT
Start: 2025-04-11

## 2025-04-11 RX ORDER — OXYCODONE AND ACETAMINOPHEN 10; 325 MG/1; MG/1
1 TABLET ORAL EVERY 4 HOURS PRN
Qty: 40 TABLET | Refills: 0 | Status: SHIPPED | OUTPATIENT
Start: 2025-04-11

## 2025-04-11 NOTE — TELEPHONE ENCOUNTER
Refill Routing Note   Medication(s) are not appropriate for processing by [facility] for the following reason(s):        Required labs outdated    [facility] action(s):  Defer               Appointments  past 12m or future 3m with PCP    Date Provider   Last Visit   4/1/2025 Emil Zhang MD   Next Visit   4/16/2025 Emil Zhang MD   ED visits in past 90 days: 1        Note composed:12:45 PM 04/11/2025

## 2025-04-11 NOTE — PROGRESS NOTES
"Subjective:   Patient ID:  Cassandra Campos is a 74 y.o. female who presents for evaluation of No chief complaint on file.      HPI 73y/o F whose current medical conditions include PSVT, pAFL post op, HTN, HLP, NIDDM 10 yrs, obesity, ex smoker, DANIEL, not on CPAP. H/o lung Ca s/p left robotic lingual lobectomy and mediastinal lymph node dissection for a high-grade adenocarcinoma in 05/24. Followed by Dr. Pelaez in cardiology clinic here today for CV follow up recent seen at [facility] ED for hypotension (undergoing radiation), c/o fatigue and SOB. Here today c/o "knot in her chest" pain. Oncology prescribed pain medications. At ED visit troponin 0.276->0.039 in setting of double PNA, EKG ST. Here today c/o chest pain musculoskeletal/reproducible has pain medication at pharamcy downstairs      LAKESHIA 5/24' nml EF  Past Medical History:   Diagnosis Date    Arthritis     hands    Bilateral bunions     Borderline glaucoma     De Quervain's disease (radial styloid tenosynovitis)     Gastritis     upper GI 2/2017    Hydradenitis     Hyperlipidemia     Hypertension     Insomnia     Lung cancer     Migraines 02/01/2000    Nasal septum perforation     Obesity     Pneumonia     Restrictive airway disease     Sleep apnea     SVT (supraventricular tachycardia) 09/2013    Trigger finger     Type 2 diabetes mellitus 2012     am 02/01/2024       Past Surgical History:   Procedure Laterality Date    AXILLARY HIDRADENITIS EXCISION Bilateral     BONE EXOSTOSIS EXCISION Right 07/25/2018    Procedure: EXCISION, EXOSTOSIS;  Surgeon: Jayro Pedraza Sr., MD;  Location: [facility] [place] OR;  Service: Orthopedics;  Laterality: Right;    BREAST BIOPSY Bilateral     both benign    BREAST LUMPECTOMY  07/1998    BREAST SURGERY  07/1998    BRONCHOSCOPY Bilateral 01/15/2025    Procedure: Bronchoscopy - insert lighted tube into airway to take a biopsy of lung;  Surgeon: Adrian Robin MD;  Location: [facility] [place] ENDO;  Service: Pulmonary;  Laterality: Bilateral; "    CARPAL TUNNEL RELEASE      bilateral    CARPAL TUNNEL RELEASE Right 2024    Procedure: RELEASE, CARPAL TUNNEL;  Surgeon: Jaime Oswald MD;  Location: [facility];  Service: Orthopedics;  Laterality: Right;    CARPAL TUNNEL RELEASE Left 2024    Procedure: RELEASE, CARPAL TUNNEL;  Surgeon: Jaime Oswald MD;  Location: [facility];  Service: Orthopedics;  Laterality: Left;    CATARACT EXTRACTION Bilateral     OU     SECTION  1979    CHOLECYSTECTOMY  2014    COLONOSCOPY N/A 10/02/2020    Procedure: COLONOSCOPY;  Surgeon: Tushar Edwards MD;  Location: [facility];  Service: Endoscopy;  Laterality: N/A;    COLONOSCOPY W/ POLYPECTOMY  10/02/2020    Polyps x3, repeat 5 years; Tushar Edwards MD     CYST REMOVAL  2015    sebaceous cyst removed from face    DE QUERVAIN'S RELEASE Left 2020    Procedure: RELEASE, HAND, FOR DEQUERVAIN'S TENOSYNOVITIS;  Surgeon: Jaime Oswald MD;  Location: [facility];  Service: Orthopedics;  Laterality: Left;    DE QUERVAIN'S RELEASE Right 2020    Procedure: RELEASE, HAND, FOR DEQUERVAIN'S TENOSYNOVITIS;  Surgeon: Jaime Oswald MD;  Location: [facility];  Service: Orthopedics;  Laterality: Right;    ENDOBRONCHIAL ULTRASOUND Bilateral 04/10/2024    Procedure: ENDOBRONCHIAL ULTRASOUND (EBUS);  Surgeon: Adrian Robin MD;  Location: [facility];  Service: Pulmonary;  Laterality: Bilateral;    ENDOBRONCHIAL ULTRASOUND Bilateral 01/15/2025    Procedure: ENDOBRONCHIAL ULTRASOUND (EBUS);  Surgeon: Adrian Robin MD;  Location: [facility];  Service: Pulmonary;  Laterality: Bilateral;    EYE SURGERY      gastric sleeve  2017    Dr. Watson    INJECTION OF ANESTHETIC AGENT AROUND MULTIPLE INTERCOSTAL NERVES  05/10/2024    Procedure: BLOCK, NERVE, INTERCOSTAL, 2 OR MORE;  Surgeon: Mike Nuñez MD;  Location: [facility];  Service: Cardiothoracic;;    KNEE SURGERY Right     OLECRANON BURSECTOMY Right 2018    Procedure:  BURSECTOMY, OLECRANON;  Surgeon: Jayro Pedraza Sr., MD;  Location: [facility] OR;  Service: Orthopedics;  Laterality: Right;    SURGICAL REMOVAL OF BUNION WITH OSTEOTOMY OF METATARSAL BONE Left 05/10/2019    Procedure: BUNIONECTOMY, WITH METATARSAL OSTEOTOMY;  Surgeon: Srinivasan Villanueva DPM;  Location: [facility] OR;  Service: Podiatry;  Laterality: Left;    SURGICAL REMOVAL OF BUNION WITH OSTEOTOMY OF METATARSAL BONE Right 06/28/2019    Procedure: BUNIONECTOMY, WITH METATARSAL OSTEOTOMY;  Surgeon: Srinivasan Villanueva DPM;  Location: [facility] OR;  Service: Podiatry;  Laterality: Right;    SURGICAL REMOVAL OF LYMPH NODE Left 05/10/2024    Procedure: EXCISION, LYMPH NODE;  Surgeon: Mike Nuñez MD;  Location: [facility] OR;  Service: Cardiothoracic;  Laterality: Left;    TONSILLECTOMY, ADENOIDECTOMY  1980s    TRANSESOPHAGEAL ECHOCARDIOGRAM WITH POSSIBLE CARDIOVERSION (LAKESHIA W/ POSS CARDIOVERSION) N/A 05/15/2024    Procedure: Transesophageal echo (LAKESHIA) intra-procedure log documentation;  Surgeon: Twan Pelaez MD;  Location: [facility] CATH LAB;  Service: Cardiology;  Laterality: N/A;    TRIGGER FINGER RELEASE Right 04/01/2015    Dr. Milo HALL ROBOTIC RATS,WITH LOBECTOMY,LUNG Left 05/10/2024    Procedure: XI ROBOTIC RATS,WITH LOBECTOMY,LUNG;  Surgeon: Mike Nuñez MD;  Location: [facility] OR;  Service: Cardiothoracic;  Laterality: Left;  Lingulectomy       Social History[1]    Family History   Problem Relation Name Age of Onset    Prostate cancer Brother      Diabetes Maternal Aunt Alondra Campos     Diabetes Cousin      Hypertension Maternal Grandmother Portia Orosco        Medications Ordered Prior to Encounter[2]   Wt Readings from Last 3 Encounters:   04/08/25 63.5 kg (139 lb 15.9 oz)   04/01/25 70.5 kg (155 lb 6.8 oz)   04/01/25 66.9 kg (147 lb 7.8 oz)     Temp Readings from Last 3 Encounters:   04/03/25 98.7 °F (37.1 °C) (Oral)   04/01/25 98.5 °F (36.9 °C) (Tympanic)   03/28/25 97.4 °F (36.3 °C)     BP Readings from Last 3 Encounters:    04/08/25 118/74   04/03/25 116/70   04/01/25 100/60     Pulse Readings from Last 3 Encounters:   04/08/25 (!) 117   04/03/25 102   04/01/25 (!) 125        Review of Systems   Constitutional: Negative.   HENT: Negative.     Eyes: Negative.    Cardiovascular: Negative.    Respiratory: Negative.     Skin: Negative.    Musculoskeletal:  Positive for arthritis, joint pain and myalgias.   Gastrointestinal: Negative.    Genitourinary: Negative.    Neurological: Negative.    Psychiatric/Behavioral: Negative.         Objective:   Physical Exam  Vitals and nursing note reviewed.   Constitutional:       Appearance: Normal appearance.   HENT:      Head: Normocephalic.   Eyes:      Pupils: Pupils are equal, round, and reactive to light.   Cardiovascular:      Rate and Rhythm: Normal rate and regular rhythm.      Heart sounds: Normal heart sounds, S1 normal and S2 normal. No murmur heard.     No S3 or S4 sounds.   Pulmonary:      Effort: Pulmonary effort is normal.      Breath sounds: Normal breath sounds.   Abdominal:      General: Bowel sounds are normal.      Palpations: Abdomen is soft.   Musculoskeletal:         General: Tenderness present. Normal range of motion.      Cervical back: Normal range of motion.   Skin:     Capillary Refill: Capillary refill takes less than 2 seconds.   Neurological:      General: No focal deficit present.      Mental Status: She is alert and oriented to person, place, and time.   Psychiatric:         Mood and Affect: Mood normal.         Behavior: Behavior normal.         Thought Content: Thought content normal.         Lab Results   Component Value Date    CHOL 184 12/26/2023    CHOL 190 09/12/2022    CHOL 208 (H) 04/13/2022     Lab Results   Component Value Date    HDL 48 12/26/2023    HDL 39 (L) 09/12/2022    HDL 37 (L) 04/13/2022     Lab Results   Component Value Date    LDLCALC 110.4 12/26/2023    LDLCALC 114.6 09/12/2022    LDLCALC 129.0 04/13/2022     Lab Results   Component Value Date     TRIG 128 12/26/2023    TRIG 182 (H) 09/12/2022    TRIG 210 (H) 04/13/2022     Lab Results   Component Value Date    CHOLHDL 26.1 12/26/2023    CHOLHDL 20.5 09/12/2022    CHOLHDL 17.8 (L) 04/13/2022       Chemistry        Component Value Date/Time     (L) 04/02/2025 0435     03/21/2025 1259    K 3.7 04/02/2025 0435    K 3.7 03/21/2025 1259     04/02/2025 0435     03/21/2025 1259    CO2 21 (L) 04/02/2025 0435    CO2 27 03/21/2025 1259    BUN 7 (L) 04/02/2025 0435    CREATININE 0.5 04/02/2025 0435    GLU 88 03/21/2025 1259        Component Value Date/Time    CALCIUM 9.0 04/02/2025 0435    CALCIUM 9.1 03/21/2025 1259    ALKPHOS 101 04/02/2025 0435    ALKPHOS 73 03/21/2025 1259    AST 59 (H) 04/02/2025 0435    AST 22 03/21/2025 1259     (H) 04/02/2025 0435    ALT 39 03/21/2025 1259    BILITOT 0.7 04/02/2025 0435    BILITOT 0.2 03/21/2025 1259    ESTGFRAFRICA >60 06/21/2022 0935    EGFRNONAA >60 06/21/2022 0935          Lab Results   Component Value Date    TSH 1.436 03/28/2025     Lab Results   Component Value Date    INR 1.0 12/11/2024    INR 1.1 05/14/2024    INR 0.9 04/04/2024     @RESUFAST(WBC,HGB,HCT,MCV,PLT)  @LABRCNTIP(BNP,BNPTRIAGEBLO)@  CrCl cannot be calculated (Patient's most recent lab result is older than the maximum 7 days allowed.).     Results for orders placed during the hospital encounter of 05/01/24    Echo    Interpretation Summary    Left Ventricle: The left ventricle is normal in size. Normal wall thickness. There is concentric remodeling. Normal wall motion. Septal motion is consistent with bundle branch block. There is normal systolic function. Ejection fraction by visual approximation is 55%. There is normal diastolic function.    Right Ventricle: Normal right ventricular cavity size. Wall thickness is normal. Systolic function is normal.    Tricuspid Valve: There is moderate regurgitation with a centrally directed jet.    Pulmonary Artery: The estimated  pulmonary artery systolic pressure is 29 mmHg.    IVC/SVC: Normal venous pressure at 3 mmHg.     No results found for this or any previous visit.     Assessment:      1. Hyperlipidemia associated with type 2 diabetes mellitus    2. Paroxysmal SVT (supraventricular tachycardia)    3. Primary hypertension    4. Calcification of aorta    5. Typical atrial flutter    6. Obesity, unspecified class, unspecified obesity type, unspecified whether serious comorbidity present        Plan:   Hyperlipidemia associated with type 2 diabetes mellitus    Paroxysmal SVT (supraventricular tachycardia)    Primary hypertension    Calcification of aorta    Typical atrial flutter    Obesity, unspecified class, unspecified obesity type, unspecified whether serious comorbidity present        Elevated troponin consider nuc stress once recovered from PNA    Losartan, metoprolol, low na diet, profile BP, hold BP med if systolic <100- HTN  Metoprolol- Tachycardia  RF modifications  Low na low fat diet  Daily exercise as tolerated    RTC in May, consider nuc stress      Courtney Guillot, FNP-C [facility], Cardiology         [1]   Social History  Tobacco Use    Smoking status: Former     Current packs/day: 0.00     Average packs/day: 0.5 packs/day for 42.0 years (21.0 ttl pk-yrs)     Types: Cigarettes     Start date: 1970     Quit date: 2012     Years since quittin.2     Passive exposure: Past    Smokeless tobacco: Never   Substance Use Topics    Alcohol use: Not Currently     Alcohol/week: 1.0 standard drink of alcohol     Types: 1 Glasses of wine per week     Comment: Glass red wine once a every 2 weeks    Drug use: Never   [2]   Current Outpatient Medications on File Prior to Visit   Medication Sig Dispense Refill    albuterol (PROVENTIL/VENTOLIN HFA) 90 mcg/actuation inhaler INHALE 2 PUFFS INTO THE LUNGS EVERY 4 HOURS AS NEEDED FOR WHEEZING OR SHORTNESS OF BREATH. 18 g 1    amitriptyline (ELAVIL) 25 MG tablet Take 1 tablet (25 mg  total) by mouth every evening. 90 tablet 1    aspirin (ECOTRIN) 81 MG EC tablet Take 1 tablet (81 mg total) by mouth once daily.      blood sugar diagnostic Strp use to test blood sugar 1-2 times a day 200 strip 3    blood-glucose meter (ACCU-CHEK GUIDE ME GLUCOSE MTR) Misc use to check blood glucose 2 times a day 1 each 0    blood-glucose meter (ACCU-CHEK GUIDE ME GLUCOSE MTR) Misc To check blood glucose 1-2 times daily, to use with insurance preferred meter 1 each 0    cefdinir (OMNICEF) 300 MG capsule Take 1 capsule (300 mg total) by mouth 2 (two) times daily. for 10 days 20 capsule 0    diphenhydrAMINE-aluminum-magnesium hydroxide-simethicone-LIDOcaine viscous HCl 2% Swish and spit 15 mLs every 4 (four) hours as needed. 500 each 3    doxycycline (VIBRA-TABS) 100 MG tablet Take 1 tablet (100 mg total) by mouth 2 (two) times daily. 20 tablet 0    eszopiclone (LUNESTA) 3 mg Tab Take 1 tablet (3 mg total) by mouth every evening. 30 tablet 1    lancets Misc Use to test blood glucose 1-2 times a day, discard lancet after each use 200 each 3    LORazepam (ATIVAN) 1 MG tablet Take 1 tablet (1 mg total) by mouth 2 (two) times daily. 60 tablet 1    losartan (COZAAR) 25 MG tablet Take 1 tablet (25 mg total) by mouth once daily. 90 tablet 2    magic mouthwash diphen/antac/lidoc swish and spit 15ml every 4 hours as needed 450 mL 3    metoprolol succinate (TOPROL-XL) 50 MG 24 hr tablet Take 1 tablet (50 mg total) by mouth once daily. 90 tablet 2    OLANZapine (ZYPREXA) 5 MG tablet Take 1 tablet (5 mg total) by mouth every evening. On days 1-3 of each chemotherapy cycle. 12 tablet 1    omeprazole (PRILOSEC) 20 MG capsule Take 1 capsule (20 mg total) by mouth once daily. 90 capsule 2    oxyCODONE-acetaminophen (PERCOCET)  mg per tablet Take 1 tablet by mouth every 4 (four) hours as needed for Pain. 40 tablet 0    prochlorperazine (COMPAZINE) 5 MG tablet Take 1 tablet (5 mg total) by mouth every 6 (six) hours as needed for  Nausea. 20 tablet 5    semaglutide (OZEMPIC) 2 mg/dose (8 mg/3 mL) PnIj Inject 2 mg into the skin every 7 days. 9 mL 0    traMADoL (ULTRAM) 50 mg tablet Take 1 tablet (50 mg total) by mouth 2 (two) times a day. 60 each 0    traMADoL (ULTRAM) 50 mg tablet Take 1 tablet (50 mg total) by mouth every 12 (twelve) hours as needed for Pain. Greater than 7 day supply medically necessary. 60 tablet 2    triamcinolone acetonide 0.025 % Lotn Apply small amount to affected areas twice a day.  Take cool showers or baths 60 mL 1     No current facility-administered medications on file prior to visit.

## 2025-04-11 NOTE — TELEPHONE ENCOUNTER
No care due was identified.  [facility] Embedded Care Due Messages. Reference number: [id number].   4/11/2025 9:48:21 AM CDT

## 2025-04-13 RX ORDER — SEMAGLUTIDE 2.68 MG/ML
2 INJECTION, SOLUTION SUBCUTANEOUS
Qty: 9 ML | Refills: 0 | Status: SHIPPED | OUTPATIENT
Start: 2025-04-13

## 2025-04-13 NOTE — TELEPHONE ENCOUNTER
No care due was identified.  [facility] Embedded Care Due Messages. Reference number: [id number].   4/13/2025 10:47:39 AM CDT

## 2025-04-14 RX ORDER — OMEPRAZOLE 20 MG/1
20 CAPSULE, DELAYED RELEASE ORAL DAILY
Qty: 90 CAPSULE | Refills: 3 | Status: SHIPPED | OUTPATIENT
Start: 2025-04-14

## 2025-04-14 NOTE — TELEPHONE ENCOUNTER
Refill Routing Note   Medication(s) are not appropriate for processing by [facility] for the following reason(s):        ED/Hospital Visit since last OV with provider    [facility] action(s):  Defer      Medication Therapy Plan: 4/1/25 ED VISIT - Multifocal pneumonia / CHEST PAIN / SEVERE SEPSIS    Extended chart review required: Yes     Appointments  past 12m or future 3m with PCP    Date Provider   Last Visit   4/1/2025 Emil Zhang MD   Next Visit   4/16/2025 Emil Zhang MD   ED visits in past 90 days: 1        Note composed:7:15 AM 04/14/2025

## 2025-04-16 ENCOUNTER — OFFICE VISIT (OUTPATIENT)
Dept: INTERNAL MEDICINE | Facility: CLINIC | Age: 75
End: 2025-04-16
Payer: MEDICARE

## 2025-04-16 ENCOUNTER — LAB VISIT (OUTPATIENT)
Dept: LAB | Facility: HOSPITAL | Age: 75
End: 2025-04-16
Attending: FAMILY MEDICINE
Payer: MEDICARE

## 2025-04-16 VITALS
HEIGHT: 62 IN | BODY MASS INDEX: 25.84 KG/M2 | WEIGHT: 140.44 LBS | DIASTOLIC BLOOD PRESSURE: 60 MMHG | TEMPERATURE: 98 F | OXYGEN SATURATION: 96 % | HEART RATE: 96 BPM | SYSTOLIC BLOOD PRESSURE: 100 MMHG

## 2025-04-16 DIAGNOSIS — E78.5 HYPERLIPIDEMIA ASSOCIATED WITH TYPE 2 DIABETES MELLITUS: Primary | Chronic | ICD-10-CM

## 2025-04-16 DIAGNOSIS — I47.10 PAROXYSMAL SVT (SUPRAVENTRICULAR TACHYCARDIA): ICD-10-CM

## 2025-04-16 DIAGNOSIS — E78.5 HYPERLIPIDEMIA ASSOCIATED WITH TYPE 2 DIABETES MELLITUS: Chronic | ICD-10-CM

## 2025-04-16 DIAGNOSIS — E11.69 HYPERLIPIDEMIA ASSOCIATED WITH TYPE 2 DIABETES MELLITUS: Primary | Chronic | ICD-10-CM

## 2025-04-16 DIAGNOSIS — I10 PRIMARY HYPERTENSION: ICD-10-CM

## 2025-04-16 DIAGNOSIS — J18.9 MULTIFOCAL PNEUMONIA: ICD-10-CM

## 2025-04-16 DIAGNOSIS — E11.69 HYPERLIPIDEMIA ASSOCIATED WITH TYPE 2 DIABETES MELLITUS: Chronic | ICD-10-CM

## 2025-04-16 LAB
CHOLEST SERPL-MCNC: 165 MG/DL (ref 120–199)
CHOLEST/HDLC SERPL: 5.5 {RATIO} (ref 2–5)
EAG (OHS): 105 MG/DL (ref 68–131)
HBA1C MFR BLD: 5.3 % (ref 4–5.6)
HDLC SERPL-MCNC: 30 MG/DL (ref 40–75)
HDLC SERPL: 18.2 % (ref 20–50)
LDLC SERPL CALC-MCNC: 109.8 MG/DL (ref 63–159)
NONHDLC SERPL-MCNC: 135 MG/DL
TRIGL SERPL-MCNC: 126 MG/DL (ref 30–150)

## 2025-04-16 PROCEDURE — 1126F AMNT PAIN NOTED NONE PRSNT: CPT | Mod: CPTII,S$GLB,, | Performed by: FAMILY MEDICINE

## 2025-04-16 PROCEDURE — 99999 PR PBB SHADOW E&M-EST. PATIENT-LVL IV: CPT | Mod: PBBFAC,,, | Performed by: FAMILY MEDICINE

## 2025-04-16 PROCEDURE — 1111F DSCHRG MED/CURRENT MED MERGE: CPT | Mod: CPTII,S$GLB,, | Performed by: FAMILY MEDICINE

## 2025-04-16 PROCEDURE — 82465 ASSAY BLD/SERUM CHOLESTEROL: CPT

## 2025-04-16 PROCEDURE — 83036 HEMOGLOBIN GLYCOSYLATED A1C: CPT

## 2025-04-16 PROCEDURE — 3078F DIAST BP <80 MM HG: CPT | Mod: CPTII,S$GLB,, | Performed by: FAMILY MEDICINE

## 2025-04-16 PROCEDURE — 99214 OFFICE O/P EST MOD 30 MIN: CPT | Mod: S$GLB,,, | Performed by: FAMILY MEDICINE

## 2025-04-16 PROCEDURE — 1159F MED LIST DOCD IN RCRD: CPT | Mod: CPTII,S$GLB,, | Performed by: FAMILY MEDICINE

## 2025-04-16 PROCEDURE — 3074F SYST BP LT 130 MM HG: CPT | Mod: CPTII,S$GLB,, | Performed by: FAMILY MEDICINE

## 2025-04-16 PROCEDURE — 36415 COLL VENOUS BLD VENIPUNCTURE: CPT

## 2025-04-16 PROCEDURE — 1101F PT FALLS ASSESS-DOCD LE1/YR: CPT | Mod: CPTII,S$GLB,, | Performed by: FAMILY MEDICINE

## 2025-04-16 PROCEDURE — 3288F FALL RISK ASSESSMENT DOCD: CPT | Mod: CPTII,S$GLB,, | Performed by: FAMILY MEDICINE

## 2025-04-16 NOTE — PROGRESS NOTES
Patient ID: Cassandra Campos is a 74 y.o. female.    Chief Complaint: Follow-up    History of Present Illness    Ms. Campos presents today for medication adjustment of metoprolol per cardiology metoprolol 50 mg daily was increased to 75 mg daily due to increased pulse rate.    She reports feeling better overall but continues to experience weakness. She acknowledges experiencing side effects from radiation therapy.    She reports resolution of recent pneumonia with no ongoing symptoms.  Chest x-ray was benign but chest CT revealed area of infiltrates.    She has a history of cancer.      ROS:  General: -fever, -chills, -fatigue, -weight gain, -weight loss, +weakness  Eyes: -vision changes, -redness, -discharge  ENT: -ear pain, -nasal congestion, -sore throat  Cardiovascular: -chest pain, -palpitations, -lower extremity edema  Respiratory: + stable cough, -shortness of breath  Gastrointestinal: -abdominal pain, -nausea, -vomiting, -diarrhea, -constipation, -blood in stool  Genitourinary: -dysuria, -hematuria, -frequency  Musculoskeletal: -joint pain, -muscle pain  Skin: -rash, -lesion  Neurological: -headache, -dizziness, -numbness, -tingling         Physical Exam    General: In no acute distress. Not ill-appearing or diaphoretic.  Neck: Normal thyroid. No cervical lymphadenopathy. 2+ carotid pulses.  Cardiovascular: Normal rate and regular  rhythm. No murmur heard. No gallop.  Pulmonary: Pulmonary effort is normal. No respiratory distress. No wheezing. No rhonchi. No rales.  Abdominal: Soft. Nontender. Nondistended. No mass.  Musculoskeletal: No swelling. No tenderness. No deformity.  Neurological: Alert.  Psychiatric: Mood normal. Behavior normal.         Assessment & Plan      - Evaluated the patient's reported weakness through physical exam, including chest auscultation.  - Acknowledged the condition's impact on overall health.  - Will review cardiology recommendations for comprehensive management.    ACUTE  ISCHEMIC HEART DISEASE:  - Reviewed cardiology recommendations regarding metoprolol adjustment.  - Inquired about chest pain, coughing, or dyspnea.  - Planned to implement potential changes in metoprolol medication based on cardiology's recommendations.    HISTORY OF CANCER:  - Acknowledged the patient's history of cancer as a predisposing factor for complications such as pneumonia.  - Evaluated ongoing respiratory symptoms related to radiation therapy side effects.  - Noted the patient's history of radiation therapy for cancer treatment.    HISTORY OF PNEUMONIA:  - Assessed the patient's recovery from a recent episode of bilateral pneumonia, with the patient reporting feeling better.  - Evaluated ongoing respiratory symptoms related to residual pneumonia effects.  - Planned to continue monitoring for any recurrence of pneumonia symptoms.          Overall she feels better.  No fever chills no significant cough.  She does have underlying lung cancer.  She is followed by Oncology.  Medications reviewed.  Lab was ordered.  Pulse of 96 with a occasional irregularity.  Follow-up in 3 months    Follow up in about 3 months (around 7/16/2025).    This note was generated with the assistance of ambient listening technology. Verbal consent was obtained by the patient and accompanying visitor(s) for the recording of patient appointment to facilitate this note. I attest to having reviewed and edited the generated note for accuracy, though some syntax or spelling errors may persist. Please contact the author of this note for any clarification.

## 2025-04-17 ENCOUNTER — RESULTS FOLLOW-UP (OUTPATIENT)
Dept: INTERNAL MEDICINE | Facility: CLINIC | Age: 75
End: 2025-04-17

## 2025-04-24 ENCOUNTER — TELEPHONE (OUTPATIENT)
Dept: INTERNAL MEDICINE | Facility: CLINIC | Age: 75
End: 2025-04-24
Payer: MEDICARE

## 2025-04-24 NOTE — TELEPHONE ENCOUNTER
Ede with [facility] want to know if you want to put pt on a statin med        ----- Message from Chanelle sent at 4/24/2025  9:06 AM CDT -----  Contact: Ede with [facility]  Type:  Patient Requesting a call back Who Called:Ede with [facility] What is the call back request regarding?:pt has diabetes and caller states that Statin will help prevent pt from having heart disease Would the patient rather a call back or a response via [facility] Call Back Number:[phone] Information:

## 2025-04-25 ENCOUNTER — RESULTS FOLLOW-UP (OUTPATIENT)
Dept: INTERNAL MEDICINE | Facility: CLINIC | Age: 75
End: 2025-04-25

## 2025-04-25 ENCOUNTER — OFFICE VISIT (OUTPATIENT)
Dept: HEMATOLOGY/ONCOLOGY | Facility: CLINIC | Age: 75
End: 2025-04-25
Payer: MEDICARE

## 2025-04-25 ENCOUNTER — LAB VISIT (OUTPATIENT)
Dept: LAB | Facility: HOSPITAL | Age: 75
End: 2025-04-25
Attending: INTERNAL MEDICINE
Payer: MEDICARE

## 2025-04-25 ENCOUNTER — RESULTS FOLLOW-UP (OUTPATIENT)
Dept: HEMATOLOGY/ONCOLOGY | Facility: CLINIC | Age: 75
End: 2025-04-25

## 2025-04-25 VITALS
HEART RATE: 110 BPM | WEIGHT: 137.88 LBS | HEIGHT: 62 IN | SYSTOLIC BLOOD PRESSURE: 121 MMHG | OXYGEN SATURATION: 99 % | TEMPERATURE: 97 F | BODY MASS INDEX: 25.37 KG/M2 | DIASTOLIC BLOOD PRESSURE: 87 MMHG

## 2025-04-25 DIAGNOSIS — Z79.69 IMMUNODEFICIENCY DUE TO CHEMOTHERAPY: ICD-10-CM

## 2025-04-25 DIAGNOSIS — R74.8 ELEVATED LIVER ENZYMES: ICD-10-CM

## 2025-04-25 DIAGNOSIS — I10 PRIMARY HYPERTENSION: ICD-10-CM

## 2025-04-25 DIAGNOSIS — D84.822 IMMUNODEFICIENCY SECONDARY TO RADIATION THERAPY: ICD-10-CM

## 2025-04-25 DIAGNOSIS — K20.80 RADIATION ESOPHAGITIS: ICD-10-CM

## 2025-04-25 DIAGNOSIS — R94.6 ABNORMAL RESULTS OF THYROID FUNCTION STUDIES: ICD-10-CM

## 2025-04-25 DIAGNOSIS — T66.XXXA RADIATION ESOPHAGITIS: ICD-10-CM

## 2025-04-25 DIAGNOSIS — T45.1X5A IMMUNODEFICIENCY DUE TO CHEMOTHERAPY: ICD-10-CM

## 2025-04-25 DIAGNOSIS — C34.32 MALIGNANT NEOPLASM OF LOWER LOBE OF LEFT LUNG: Primary | ICD-10-CM
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
## 2025-04-25 DIAGNOSIS — D84.821 IMMUNODEFICIENCY DUE TO CHEMOTHERAPY: ICD-10-CM

## 2025-04-25 DIAGNOSIS — E11.65 TYPE 2 DIABETES MELLITUS WITH HYPERGLYCEMIA, WITHOUT LONG-TERM CURRENT USE OF INSULIN: ICD-10-CM

## 2025-04-25 DIAGNOSIS — C34.92 ADENOCARCINOMA OF LEFT LUNG: ICD-10-CM

## 2025-04-25 LAB
ABSOLUTE EOSINOPHIL (OHS): 0.01 K/UL
ABSOLUTE MONOCYTE (OHS): 0.75 K/UL (ref 0.3–1)
ABSOLUTE NEUTROPHIL COUNT (OHS): 2.85 K/UL (ref 1.8–7.7)
ALBUMIN SERPL BCP-MCNC: 3 G/DL (ref 3.5–5.2)
ALP SERPL-CCNC: 129 UNIT/L (ref 40–150)
ALT SERPL W/O P-5'-P-CCNC: 20 UNIT/L (ref 10–44)
ANION GAP (OHS): 9 MMOL/L (ref 8–16)
AST SERPL-CCNC: 29 UNIT/L (ref 11–45)
BASOPHILS # BLD AUTO: 0.05 K/UL
BASOPHILS NFR BLD AUTO: 0.9 %
BILIRUB SERPL-MCNC: <0.5 MG/DL (ref 0.1–1)
BUN SERPL-MCNC: 6 MG/DL (ref 8–23)
CALCIUM SERPL-MCNC: 9.4 MG/DL (ref 8.7–10.5)
CHLORIDE SERPL-SCNC: 109 MMOL/L (ref 95–110)
CO2 SERPL-SCNC: 22 MMOL/L (ref 23–29)
CREAT SERPL-MCNC: 0.6 MG/DL (ref 0.5–1.4)
ERYTHROCYTE [DISTWIDTH] IN BLOOD BY AUTOMATED COUNT: 19.4 % (ref 11.5–14.5)
GFR SERPLBLD CREATININE-BSD FMLA CKD-EPI: >60 ML/MIN/1.73/M2
GLUCOSE SERPL-MCNC: 91 MG/DL (ref 70–110)
HCT VFR BLD AUTO: 38.4 % (ref 37–48.5)
HGB BLD-MCNC: 13.2 GM/DL (ref 12–16)
IMM GRANULOCYTES # BLD AUTO: 0.02 K/UL (ref 0–0.04)
IMM GRANULOCYTES NFR BLD AUTO: 0.4 % (ref 0–0.5)
LYMPHOCYTES # BLD AUTO: 1.65 K/UL (ref 1–4.8)
MCH RBC QN AUTO: 27.1 PG (ref 27–31)
MCHC RBC AUTO-ENTMCNC: 34.4 G/DL (ref 32–36)
MCV RBC AUTO: 79 FL (ref 82–98)
NUCLEATED RBC (/100WBC) (OHS): 0 /100 WBC
PLATELET # BLD AUTO: 219 K/UL (ref 150–450)
PMV BLD AUTO: 8.3 FL (ref 9.2–12.9)
POTASSIUM SERPL-SCNC: 3.3 MMOL/L (ref 3.5–5.1)
PROT SERPL-MCNC: 8 GM/DL (ref 6–8.4)
RBC # BLD AUTO: 4.87 M/UL (ref 4–5.4)
RELATIVE EOSINOPHIL (OHS): 0.2 %
RELATIVE LYMPHOCYTE (OHS): 31 % (ref 18–48)
RELATIVE MONOCYTE (OHS): 14.1 % (ref 4–15)
RELATIVE NEUTROPHIL (OHS): 53.4 % (ref 38–73)
SODIUM SERPL-SCNC: 140 MMOL/L (ref 136–145)
WBC # BLD AUTO: 5.33 K/UL (ref 3.9–12.7)

## 2025-04-25 PROCEDURE — 83036 HEMOGLOBIN GLYCOSYLATED A1C: CPT

## 2025-04-25 PROCEDURE — 80053 COMPREHEN METABOLIC PANEL: CPT

## 2025-04-25 PROCEDURE — 99999 PR PBB SHADOW E&M-EST. PATIENT-LVL V: CPT | Mod: PBBFAC,,, | Performed by: INTERNAL MEDICINE

## 2025-04-25 PROCEDURE — 80061 LIPID PANEL: CPT

## 2025-04-25 PROCEDURE — 36415 COLL VENOUS BLD VENIPUNCTURE: CPT

## 2025-04-25 PROCEDURE — 84443 ASSAY THYROID STIM HORMONE: CPT

## 2025-04-25 PROCEDURE — 85025 COMPLETE CBC W/AUTO DIFF WBC: CPT

## 2025-04-25 RX ORDER — SODIUM CHLORIDE 0.9 % (FLUSH) 0.9 %
10 SYRINGE (ML) INJECTION
Status: CANCELLED | OUTPATIENT
Start: 2025-04-25

## 2025-04-25 RX ORDER — OXYCODONE AND ACETAMINOPHEN 10; 325 MG/1; MG/1
1 TABLET ORAL EVERY 4 HOURS PRN
Qty: 40 TABLET | Refills: 0 | Status: SHIPPED | OUTPATIENT
Start: 2025-04-25

## 2025-04-25 RX ORDER — DIPHENHYDRAMINE HYDROCHLORIDE 50 MG/ML
50 INJECTION, SOLUTION INTRAMUSCULAR; INTRAVENOUS ONCE AS NEEDED
Status: CANCELLED | OUTPATIENT
Start: 2025-04-25

## 2025-04-25 RX ORDER — EPINEPHRINE 0.3 MG/.3ML
0.3 INJECTION SUBCUTANEOUS ONCE AS NEEDED
Status: CANCELLED | OUTPATIENT
Start: 2025-04-25

## 2025-04-25 RX ORDER — HEPARIN 100 UNIT/ML
500 SYRINGE INTRAVENOUS
Status: CANCELLED | OUTPATIENT
Start: 2025-04-25

## 2025-04-25 NOTE — PLAN OF CARE
DISCONTINUE ON PATHWAY REGIMEN - Non-Small Cell Lung    [id number]        Paclitaxel       Carboplatin           Additional Orders: Chemotherapy is given concurrently with radiation.    **Always confirm dose/schedule in your pharmacy ordering system**    REASON: Continuation Of Treatment  PRIOR TREATMENT: [id number]  TREATMENT RESPONSE: Complete Response (CR)    START ON PATHWAY REGIMEN - Non-Small Cell Lung    [id number]        Durvalumab (Imfinzi)           Additional Orders: For patients weighing less than 30 kg, only   weight-based dosing is recommended for durvalumab. For patients weighing greater   than or equal to 30 kg, weight-based or flat dosing can be used based on   indication. See PI for details. Serious immune-mediated adverse events can occur   with durvalumab. Please monitor your patient and refer to the linked   immune-mediated adverse reaction management materials for more information.    **Always confirm dose/schedule in your pharmacy ordering system**    Patient Characteristics:  Preoperative or Nonsurgical Candidate (Clinical Staging), Stage IIB (N2a only)   or Stage III - Nonsurgical Candidate, PS = 0,1  Therapeutic Status: Preoperative or Nonsurgical Candidate (Clinical Staging)  AJCC T Category: cT3  AJCC N Category: cN1  AJCC M Category: cM0  AJCC 9 Stage Grouping: IIIA  Check here if patient was staged using an edition other than AJCC Staging 9th   Edition: true  ECOG Performance Status: 1  Intent of Therapy:  Curative Intent, Not Discussed with Patient

## 2025-04-25 NOTE — PROGRESS NOTES
Subjective:       Patient ID: Cassandra Campos is a 74 y.o. female.    Chief Complaint: Results, Chemotherapy, and Lung Cancer    HPI:  74-year-old female with stage III non-small cell lung carcinoma completion of carboplatin Taxol external beam radiation therapy.  Patient returns for review in initiation of maintenance immunotherapy.  ECOG status 1    Past Medical History:   Diagnosis Date    Arthritis     hands    Bilateral bunions     Borderline glaucoma     De Quervain's disease (radial styloid tenosynovitis)     Gastritis     upper GI 2/2017    Hydradenitis     Hyperlipidemia     Hypertension     Insomnia     Lung cancer     Migraines 02/01/2000    Nasal septum perforation     Obesity     Pneumonia     Restrictive airway disease     Sleep apnea     SVT (supraventricular tachycardia) 09/2013    Trigger finger     Type 2 diabetes mellitus 2012     am 02/01/2024     Family History   Problem Relation Name Age of Onset    Prostate cancer Brother      Diabetes Maternal Aunt Alondra Campos     Diabetes Cousin      Hypertension Maternal Grandmother Portia Orosco      Social History[1]  Past Surgical History:   Procedure Laterality Date    AXILLARY HIDRADENITIS EXCISION Bilateral     BONE EXOSTOSIS EXCISION Right 07/25/2018    Procedure: EXCISION, EXOSTOSIS;  Surgeon: Jayro Pedraza Sr., MD;  Location: [facility] OR;  Service: Orthopedics;  Laterality: Right;    BREAST BIOPSY Bilateral     both benign    BREAST LUMPECTOMY  07/1998    BREAST SURGERY  07/1998    BRONCHOSCOPY Bilateral 01/15/2025    Procedure: Bronchoscopy - insert lighted tube into airway to take a biopsy of lung;  Surgeon: Adrian Robin MD;  Location: [facility];  Service: Pulmonary;  Laterality: Bilateral;    CARPAL TUNNEL RELEASE      bilateral    CARPAL TUNNEL RELEASE Right 02/09/2024    Procedure: RELEASE, CARPAL TUNNEL;  Surgeon: Jaime Oswald MD;  Location: [facility] OR;  Service: Orthopedics;  Laterality: Right;    CARPAL TUNNEL RELEASE  Left 2024    Procedure: RELEASE, CARPAL TUNNEL;  Surgeon: Jaime Oswald MD;  Location: [facility];  Service: Orthopedics;  Laterality: Left;    CATARACT EXTRACTION Bilateral     OU     SECTION  1979    CHOLECYSTECTOMY  2014    COLONOSCOPY N/A 10/02/2020    Procedure: COLONOSCOPY;  Surgeon: Tushar Edwards MD;  Location: [facility];  Service: Endoscopy;  Laterality: N/A;    COLONOSCOPY W/ POLYPECTOMY  10/02/2020    Polyps x3, repeat 5 years; Tushar Edwards MD     CYST REMOVAL  2015    sebaceous cyst removed from face    DE QUERVAIN'S RELEASE Left 2020    Procedure: RELEASE, HAND, FOR DEQUERVAIN'S TENOSYNOVITIS;  Surgeon: Jaime Oswald MD;  Location: [facility] OR;  Service: Orthopedics;  Laterality: Left;    DE QUERVAIN'S RELEASE Right 2020    Procedure: RELEASE, HAND, FOR DEQUERVAIN'S TENOSYNOVITIS;  Surgeon: Jaime Oswald MD;  Location: [facility];  Service: Orthopedics;  Laterality: Right;    ENDOBRONCHIAL ULTRASOUND Bilateral 04/10/2024    Procedure: ENDOBRONCHIAL ULTRASOUND (EBUS);  Surgeon: Adrian Robin MD;  Location: [facility];  Service: Pulmonary;  Laterality: Bilateral;    ENDOBRONCHIAL ULTRASOUND Bilateral 01/15/2025    Procedure: ENDOBRONCHIAL ULTRASOUND (EBUS);  Surgeon: Adrian Robin MD;  Location: [facility];  Service: Pulmonary;  Laterality: Bilateral;    EYE SURGERY      gastric sleeve  2017    Dr. Watson    INJECTION OF ANESTHETIC AGENT AROUND MULTIPLE INTERCOSTAL NERVES  05/10/2024    Procedure: BLOCK, NERVE, INTERCOSTAL, 2 OR MORE;  Surgeon: Mike Nuñez MD;  Location: [facility];  Service: Cardiothoracic;;    KNEE SURGERY Right     OLECRANON BURSECTOMY Right 2018    Procedure: BURSECTOMY, OLECRANON;  Surgeon: Jayro Pedraza Sr., MD;  Location: [facility];  Service: Orthopedics;  Laterality: Right;    SURGICAL REMOVAL OF BUNION WITH OSTEOTOMY OF METATARSAL BONE Left 05/10/2019    Procedure: BUNIONECTOMY, WITH METATARSAL  OSTEOTOMY;  Surgeon: Srinivasan Villanueva DPM;  Location: [facility] OR;  Service: Podiatry;  Laterality: Left;    SURGICAL REMOVAL OF BUNION WITH OSTEOTOMY OF METATARSAL BONE Right 06/28/2019    Procedure: BUNIONECTOMY, WITH METATARSAL OSTEOTOMY;  Surgeon: Srinivasan Villanueva DPM;  Location: [facility] OR;  Service: Podiatry;  Laterality: Right;    SURGICAL REMOVAL OF LYMPH NODE Left 05/10/2024    Procedure: EXCISION, LYMPH NODE;  Surgeon: Mike Nuñez MD;  Location: [facility] OR;  Service: Cardiothoracic;  Laterality: Left;    TONSILLECTOMY, ADENOIDECTOMY  1980s    TRANSESOPHAGEAL ECHOCARDIOGRAM WITH POSSIBLE CARDIOVERSION (LAKESHIA W/ POSS CARDIOVERSION) N/A 05/15/2024    Procedure: Transesophageal echo (LAKESHIA) intra-procedure log documentation;  Surgeon: wTan Pelaez MD;  Location: [facility] CATH LAB;  Service: Cardiology;  Laterality: N/A;    TRIGGER FINGER RELEASE Right 04/01/2015    Dr. Milo HALL ROBOTIC RATS,WITH LOBECTOMY,LUNG Left 05/10/2024    Procedure: XI ROBOTIC RATS,WITH LOBECTOMY,LUNG;  Surgeon: Mike Nuñez MD;  Location: [facility] OR;  Service: Cardiothoracic;  Laterality: Left;  Lingulectomy       Labs:  Lab Results   Component Value Date    WBC 5.33 04/25/2025    HGB 13.2 04/25/2025    HCT 38.4 04/25/2025    MCV 79 (L) 04/25/2025     04/25/2025     BMP  Lab Results   Component Value Date     04/25/2025    K 3.3 (L) 04/25/2025     04/25/2025    CO2 22 (L) 04/25/2025    BUN 6 (L) 04/25/2025    CREATININE 0.6 04/25/2025    CALCIUM 9.4 04/25/2025    ANIONGAP 9 04/25/2025    ESTGFRAFRICA >60 06/21/2022    EGFRNONAA >60 06/21/2022     Lab Results   Component Value Date    ALT 20 04/25/2025    AST 29 04/25/2025    ALKPHOS 129 04/25/2025    BILITOT <0.5 04/25/2025       Lab Results   Component Value Date    IRON 112 09/12/2022    TIBC 448 11/28/2016    FERRITIN 35 09/12/2022     Lab Results   Component Value Date    YMQPBOHQ85 1440 (H) 08/14/2018     Lab Results   Component Value Date    FOLATE 15.5 11/28/2016      Lab Results   Component Value Date    TSH 1.436 03/28/2025         Review of Systems   Constitutional:  Negative for activity change, appetite change, chills, diaphoresis, fatigue, fever and unexpected weight change.   HENT:  Negative for congestion, dental problem, drooling, ear discharge, ear pain, facial swelling, hearing loss, mouth sores, nosebleeds, postnasal drip, rhinorrhea, sinus pressure, sneezing, sore throat, tinnitus, trouble swallowing and voice change.    Eyes:  Negative for photophobia, pain, discharge, redness, itching and visual disturbance.   Respiratory:  Negative for cough, choking, chest tightness, shortness of breath, wheezing and stridor.    Cardiovascular:  Negative for chest pain, palpitations and leg swelling.   Gastrointestinal:  Negative for abdominal distention, abdominal pain, anal bleeding, blood in stool, constipation, diarrhea, nausea, rectal pain and vomiting.   Endocrine: Negative for cold intolerance, heat intolerance, polydipsia, polyphagia and polyuria.   Genitourinary:  Negative for decreased urine volume, difficulty urinating, dyspareunia, dysuria, enuresis, flank pain, frequency, genital sores, hematuria, menstrual problem, pelvic pain, urgency, vaginal bleeding, vaginal discharge and vaginal pain.   Musculoskeletal:  Negative for arthralgias, back pain, gait problem, joint swelling, myalgias, neck pain and neck stiffness.   Skin:  Negative for color change, pallor and rash.   Allergic/Immunologic: Negative for environmental allergies, food allergies and immunocompromised state.   Neurological:  Negative for dizziness, tremors, seizures, syncope, facial asymmetry, speech difficulty, weakness, light-headedness, numbness and headaches.   Hematological:  Negative for adenopathy. Does not bruise/bleed easily.   Psychiatric/Behavioral:  Negative for agitation, behavioral problems, confusion, decreased concentration, dysphoric mood, hallucinations, self-injury, sleep  disturbance and suicidal ideas. The patient is not nervous/anxious and is not hyperactive.        Objective:      Physical Exam  Vitals reviewed.   Constitutional:       General: She is not in acute distress.     Appearance: She is well-developed. She is not diaphoretic.   HENT:      Head: Normocephalic and atraumatic.      Right Ear: External ear normal.      Left Ear: External ear normal.      Nose: Nose normal.      Right Sinus: No maxillary sinus tenderness or frontal sinus tenderness.      Left Sinus: No maxillary sinus tenderness or frontal sinus tenderness.      Mouth/Throat:      Pharynx: No oropharyngeal exudate.   Eyes:      General: Lids are normal. No scleral icterus.        Right eye: No discharge.         Left eye: No discharge.      Conjunctiva/sclera: Conjunctivae normal.      Right eye: Right conjunctiva is not injected. No hemorrhage.     Left eye: Left conjunctiva is not injected. No hemorrhage.     Pupils: Pupils are equal, round, and reactive to light.   Neck:      Thyroid: No thyromegaly.      Vascular: No JVD.      Trachea: No tracheal deviation.   Cardiovascular:      Rate and Rhythm: Normal rate.   Pulmonary:      Effort: Pulmonary effort is normal. No respiratory distress.      Breath sounds: No stridor.   Chest:      Chest wall: No tenderness.   Abdominal:      General: Bowel sounds are normal. There is no distension.      Palpations: Abdomen is soft. There is no hepatomegaly, splenomegaly or mass.      Tenderness: There is no abdominal tenderness. There is no rebound.   Musculoskeletal:         General: No tenderness. Normal range of motion.      Cervical back: Normal range of motion and neck supple.   Lymphadenopathy:      Cervical: No cervical adenopathy.      Upper Body:      Right upper body: No supraclavicular adenopathy.      Left upper body: No supraclavicular adenopathy.   Skin:     General: Skin is dry.      Findings: No erythema or rash.   Neurological:      Mental Status: She  is alert and oriented to person, place, and time.      Cranial Nerves: No cranial nerve deficit.      Coordination: Coordination normal.   Psychiatric:         Behavior: Behavior normal.         Thought Content: Thought content normal.         Judgment: Judgment normal.             Assessment:      1. Malignant neoplasm of lower lobe of left lung    2. Immunodeficiency due to chemotherapy    3. Immunodeficiency secondary to radiation therapy    4. Abnormal results of thyroid function studies           Med Onc Chart Routing      Follow up with physician . Return to clinic in proximally three-week with CBC CMP C-reactive protein LDH and TSH along with CT chest abdomen pelvis prior.   Follow up with [facility]    Infusion scheduling note    Injection scheduling note Start Durvalumab monthly on day that is sees me after labs and CT chest abdomen pelvis done prior   Labs    Imaging    Pharmacy appointment    Other referrals                   Plan:     Extensive conversation with patient.  I have seen and evaluated the patient the patient will have repeat imaging studies to make sure no evidence of metastatic disease in proceed with maintenance immunotherapy for 1 year consent for immunotherapy has been signedConsented the patient to the treatment plan and the patient was educated on the planned duration of the treatment and schedule of the treatment administration.  Referral made if not already for advanced care planningAdvance Care Planning orders written reviewed in explain rationale to patient total time 40 minutes             Emerson Moran Jr, MD FACP         [1]   Social History  Socioeconomic History    Marital status:     Number of children: 1   Occupational History    Occupation:  aid   Tobacco Use    Smoking status: Former     Current packs/day: 0.00     Average packs/day: 0.5 packs/day for 42.0 years (21.0 ttl pk-yrs)     Types: Cigarettes     Start date: 1/1/1970     Quit date: 1/1/2012      Years since quittin.3     Passive exposure: Past    Smokeless tobacco: Never   Substance and Sexual Activity    Alcohol use: Not Currently     Alcohol/week: 1.0 standard drink of alcohol     Types: 1 Glasses of wine per week     Comment: Glass red wine once a every 2 weeks    Drug use: Never    Sexual activity: Not Currently     Partners: Female     Birth control/protection: Abstinence, None   Social History Narrative    Single, part-time teacher. Masters degree biology.      Social Drivers of Health     Financial Resource Strain: Patient Declined (4/3/2025)    Overall Financial Resource Strain (CARDIA)     Difficulty of Paying Living Expenses: Patient declined   Food Insecurity: Patient Declined (4/3/2025)    Hunger Vital Sign     Worried About Running Out of Food in the Last Year: Patient declined     Ran Out of Food in the Last Year: Patient declined   Transportation Needs: No Transportation Needs (2025)    PRAPARE - Transportation     Lack of Transportation (Medical): No     Lack of Transportation (Non-Medical): No   Physical Activity: Insufficiently Active (2024)    Exercise Vital Sign     Days of Exercise per Week: 3 days     Minutes of Exercise per Session: 20 min   Stress: Stress Concern Present (4/3/2025)    [employer] - Occupational Stress Questionnaire     Feeling of Stress : Very much   Housing Stability: High Risk (4/3/2025)    Housing Stability Vital Sign     Unable to Pay for Housing in the Last Year: Yes     Homeless in the Last Year: No

## 2025-04-26 LAB
CHOLEST SERPL-MCNC: 157 MG/DL (ref 120–199)
CHOLEST/HDLC SERPL: 3.9 {RATIO} (ref 2–5)
EAG (OHS): 108 MG/DL (ref 68–131)
HBA1C MFR BLD: 5.4 % (ref 4–5.6)
HDLC SERPL-MCNC: 40 MG/DL (ref 40–75)
HDLC SERPL: 25.5 % (ref 20–50)
LDLC SERPL CALC-MCNC: 97 MG/DL (ref 63–159)
NONHDLC SERPL-MCNC: 117 MG/DL
TRIGL SERPL-MCNC: 100 MG/DL (ref 30–150)
TSH SERPL-ACNC: 1.82 UIU/ML (ref 0.4–4)

## 2025-05-01 ENCOUNTER — OFFICE VISIT (OUTPATIENT)
Dept: UROLOGY | Facility: CLINIC | Age: 75
End: 2025-05-01
Payer: MEDICARE

## 2025-05-01 VITALS
SYSTOLIC BLOOD PRESSURE: 110 MMHG | WEIGHT: 137.88 LBS | BODY MASS INDEX: 25.37 KG/M2 | DIASTOLIC BLOOD PRESSURE: 79 MMHG | HEART RATE: 93 BPM | HEIGHT: 62 IN

## 2025-05-01 DIAGNOSIS — R39.12 WEAK URINARY STREAM: ICD-10-CM

## 2025-05-01 DIAGNOSIS — R33.9 URINARY RETENTION: ICD-10-CM

## 2025-05-01 PROCEDURE — 3074F SYST BP LT 130 MM HG: CPT | Mod: CPTII,S$GLB,, | Performed by: UROLOGY

## 2025-05-01 PROCEDURE — 1101F PT FALLS ASSESS-DOCD LE1/YR: CPT | Mod: CPTII,S$GLB,, | Performed by: UROLOGY

## 2025-05-01 PROCEDURE — 1159F MED LIST DOCD IN RCRD: CPT | Mod: CPTII,S$GLB,, | Performed by: UROLOGY

## 2025-05-01 PROCEDURE — 3288F FALL RISK ASSESSMENT DOCD: CPT | Mod: CPTII,S$GLB,, | Performed by: UROLOGY

## 2025-05-01 PROCEDURE — 3044F HG A1C LEVEL LT 7.0%: CPT | Mod: CPTII,S$GLB,, | Performed by: UROLOGY

## 2025-05-01 PROCEDURE — 3078F DIAST BP <80 MM HG: CPT | Mod: CPTII,S$GLB,, | Performed by: UROLOGY

## 2025-05-01 PROCEDURE — 1126F AMNT PAIN NOTED NONE PRSNT: CPT | Mod: CPTII,S$GLB,, | Performed by: UROLOGY

## 2025-05-01 PROCEDURE — 1160F RVW MEDS BY RX/DR IN RCRD: CPT | Mod: CPTII,S$GLB,, | Performed by: UROLOGY

## 2025-05-01 PROCEDURE — 99999 PR PBB SHADOW E&M-EST. PATIENT-LVL IV: CPT | Mod: PBBFAC,,, | Performed by: UROLOGY

## 2025-05-01 PROCEDURE — 1111F DSCHRG MED/CURRENT MED MERGE: CPT | Mod: CPTII,S$GLB,, | Performed by: UROLOGY

## 2025-05-01 PROCEDURE — 99204 OFFICE O/P NEW MOD 45 MIN: CPT | Mod: S$GLB,,, | Performed by: UROLOGY

## 2025-05-01 RX ORDER — TAMSULOSIN HYDROCHLORIDE 0.4 MG/1
0.4 CAPSULE ORAL DAILY
Qty: 30 CAPSULE | Refills: 11 | Status: SHIPPED | OUTPATIENT
Start: 2025-05-01 | End: 2026-05-01

## 2025-05-01 NOTE — PROGRESS NOTES
Chief Complaint: weak stream    HPI:   Cassandra Campos is a 74 y.o. female that presents today as a referral from [facility] for weak urinary stream.  Patient notes that this has been going on for quite some time.  States that she often times will have to strain in order to get started and will sometimes need to strain in order to empty.  She notes that her stream varies depending on how full her bladder is.  Denies any episodes of retention.  Denies UTIs.  Denies gross hematuria or dysuria.  Denies kidney stones or family history of  cancers.    PMH:  Past Medical History:   Diagnosis Date    Arthritis     hands    Bilateral bunions     Borderline glaucoma     De Quervain's disease (radial styloid tenosynovitis)     Gastritis     upper GI 2/2017    Hydradenitis     Hyperlipidemia     Hypertension     Insomnia     Lung cancer     Migraines 02/01/2000    Nasal septum perforation     Obesity     Pneumonia     Restrictive airway disease     Sleep apnea     SVT (supraventricular tachycardia) 09/2013    Trigger finger     Type 2 diabetes mellitus 2012     am 02/01/2024       PSH:  Past Surgical History:   Procedure Laterality Date    AXILLARY HIDRADENITIS EXCISION Bilateral     BONE EXOSTOSIS EXCISION Right 07/25/2018    Procedure: EXCISION, EXOSTOSIS;  Surgeon: Jayro Pedraza Sr., MD;  Location: [facility] OR;  Service: Orthopedics;  Laterality: Right;    BREAST BIOPSY Bilateral     both benign    BREAST LUMPECTOMY  07/1998    BREAST SURGERY  07/1998    BRONCHOSCOPY Bilateral 01/15/2025    Procedure: Bronchoscopy - insert lighted tube into airway to take a biopsy of lung;  Surgeon: Adrian Robin MD;  Location: [facility];  Service: Pulmonary;  Laterality: Bilateral;    CARPAL TUNNEL RELEASE      bilateral    CARPAL TUNNEL RELEASE Right 02/09/2024    Procedure: RELEASE, CARPAL TUNNEL;  Surgeon: Jaime Oswald MD;  Location: [facility] OR;  Service: Orthopedics;  Laterality: Right;    CARPAL TUNNEL RELEASE Left  2024    Procedure: RELEASE, CARPAL TUNNEL;  Surgeon: Jaime Oswald MD;  Location: [facility];  Service: Orthopedics;  Laterality: Left;    CATARACT EXTRACTION Bilateral     OU     SECTION  1979    CHOLECYSTECTOMY  2014    COLONOSCOPY N/A 10/02/2020    Procedure: COLONOSCOPY;  Surgeon: Tushar Edwards MD;  Location: [facility];  Service: Endoscopy;  Laterality: N/A;    COLONOSCOPY W/ POLYPECTOMY  10/02/2020    Polyps x3, repeat 5 years; Tushar Edwards MD     CYST REMOVAL  2015    sebaceous cyst removed from face    DE QUERVAIN'S RELEASE Left 2020    Procedure: RELEASE, HAND, FOR DEQUERVAIN'S TENOSYNOVITIS;  Surgeon: Jaime Oswald MD;  Location: [facility] OR;  Service: Orthopedics;  Laterality: Left;    DE QUERVAIN'S RELEASE Right 2020    Procedure: RELEASE, HAND, FOR DEQUERVAIN'S TENOSYNOVITIS;  Surgeon: Jaime Oswald MD;  Location: [facility];  Service: Orthopedics;  Laterality: Right;    ENDOBRONCHIAL ULTRASOUND Bilateral 04/10/2024    Procedure: ENDOBRONCHIAL ULTRASOUND (EBUS);  Surgeon: Adrian Robin MD;  Location: [facility];  Service: Pulmonary;  Laterality: Bilateral;    ENDOBRONCHIAL ULTRASOUND Bilateral 01/15/2025    Procedure: ENDOBRONCHIAL ULTRASOUND (EBUS);  Surgeon: Adrian Robin MD;  Location: [facility];  Service: Pulmonary;  Laterality: Bilateral;    EYE SURGERY      gastric sleeve  2017    Dr. Watson    INJECTION OF ANESTHETIC AGENT AROUND MULTIPLE INTERCOSTAL NERVES  05/10/2024    Procedure: BLOCK, NERVE, INTERCOSTAL, 2 OR MORE;  Surgeon: Mike Nuñez MD;  Location: [facility];  Service: Cardiothoracic;;    KNEE SURGERY Right     OLECRANON BURSECTOMY Right 2018    Procedure: BURSECTOMY, OLECRANON;  Surgeon: Jayro Pedraza Sr., MD;  Location: [facility];  Service: Orthopedics;  Laterality: Right;    SURGICAL REMOVAL OF BUNION WITH OSTEOTOMY OF METATARSAL BONE Left 05/10/2019    Procedure: BUNIONECTOMY, WITH METATARSAL OSTEOTOMY;   Surgeon: Srinivasan Villanueva DPM;  Location: [facility] OR;  Service: Podiatry;  Laterality: Left;    SURGICAL REMOVAL OF BUNION WITH OSTEOTOMY OF METATARSAL BONE Right 06/28/2019    Procedure: BUNIONECTOMY, WITH METATARSAL OSTEOTOMY;  Surgeon: Srinivasan Villanueva DPM;  Location: [facility] OR;  Service: Podiatry;  Laterality: Right;    SURGICAL REMOVAL OF LYMPH NODE Left 05/10/2024    Procedure: EXCISION, LYMPH NODE;  Surgeon: Mike Nuñez MD;  Location: [facility] OR;  Service: Cardiothoracic;  Laterality: Left;    TONSILLECTOMY, ADENOIDECTOMY  1980s    TRANSESOPHAGEAL ECHOCARDIOGRAM WITH POSSIBLE CARDIOVERSION (LAKESHIA W/ POSS CARDIOVERSION) N/A 05/15/2024    Procedure: Transesophageal echo (LAKESHIA) intra-procedure log documentation;  Surgeon: Twan Pelaez MD;  Location: [facility] CATH LAB;  Service: Cardiology;  Laterality: N/A;    TRIGGER FINGER RELEASE Right 04/01/2015    Dr. Pedraza    XI ROBOTIC RATS,WITH LOBECTOMY,LUNG Left 05/10/2024    Procedure: XI ROBOTIC RATS,WITH LOBECTOMY,LUNG;  Surgeon: Mike Nuñez MD;  Location: [facility] OR;  Service: Cardiothoracic;  Laterality: Left;  Lingulectomy       Family History:  Family History   Problem Relation Name Age of Onset    Prostate cancer Brother      Diabetes Maternal Aunt Alnodra Campos     Diabetes Cousin      Hypertension Maternal Grandmother Portia Orosco        Social History:  Social History[1]     Review of Systems:  General: No fever, chills  Skin: No rashes  Chest:  Denies cough and sputum production  Heart: Denies chest pain  Resp: Denies dyspnea  Abdomen: Denies diarrhea, abdominal pain, hematemesis, or blood in stool.  Musculoskeletal: No joint stiffness or swelling. Denies back pain.  : see HPI  Neuro: no dizziness or weakness    Allergies:  Patient has no known allergies.    Medications:  Current Medications[2]    Physical Exam:  Vitals:    05/01/25 1419   BP: 110/79   Pulse: 93     Body mass index is 25.22 kg/m².  General: awake, alert, cooperative  Head: NC/AT  Ears: external  ears normal  Eyes: sclera normal  Lungs: normal inspiration, NAD  Heart: well-perfused  Skin: The skin is warm and dry  Ext: No c/c/e.  Neuro: grossly intact, AOx3    RADIOLOGY:  No recent relevant imaging available for review.    LABS:  I personally reviewed the following lab values:  Lab Results   Component Value Date    WBC 5.33 2025    HGB 13.2 2025    HCT 38.4 2025     2025     2025    K 3.3 (L) 2025     2025    CREATININE 0.6 2025    BUN 6 (L) 2025    CO2 22 (L) 2025    TSH 1.821 2025    INR 1.0 2024    GLUF 121 (H) 12/15/2022    HGBA1C 5.4 2025    MICROALBUR 12 2020    CHOL 157 2025    TRIG 100 2025    HDL 40 2025    ALT 20 2025    AST 29 2025       URINALYSIS:  Urinalysis obtained in clinic today specific gravity 1.020 pH six trace ketones small bili trace protein trace intact blood    PVR = 15 mL    Assessment/Plan:   Cassandra Campos is a 74 y.o. female with:    Weak urinary stream - bothersome enough that she wants to try medication, start Flomax, side effects reviewed, follow-up 6-8 weeks for symptom check    Thank you for allowing me the opportunity to participate in this patient's care.     Jorge A Ferreira MD  Urology         [1]   Social History  Tobacco Use    Smoking status: Former     Current packs/day: 0.00     Average packs/day: 0.5 packs/day for 42.0 years (21.0 ttl pk-yrs)     Types: Cigarettes     Start date: 1970     Quit date: 2012     Years since quittin.3     Passive exposure: Past    Smokeless tobacco: Never   Substance Use Topics    Alcohol use: Not Currently     Alcohol/week: 1.0 standard drink of alcohol     Types: 1 Glasses of wine per week     Comment: Glass red wine once a every 2 weeks    Drug use: Never   [2]   Current Outpatient Medications:     albuterol (PROVENTIL/VENTOLIN HFA) 90 mcg/actuation inhaler, INHALE 2 PUFFS INTO THE  LUNGS EVERY 4 HOURS AS NEEDED FOR WHEEZING OR SHORTNESS OF BREATH., Disp: 18 g, Rfl: 1    amitriptyline (ELAVIL) 25 MG tablet, Take 1 tablet (25 mg total) by mouth every evening., Disp: 90 tablet, Rfl: 1    aspirin (ECOTRIN) 81 MG EC tablet, Take 1 tablet (81 mg total) by mouth once daily., Disp: , Rfl:     blood sugar diagnostic Strp, use to test blood sugar 1-2 times a day, Disp: 200 strip, Rfl: 3    blood-glucose meter (ACCU-CHEK GUIDE ME GLUCOSE MTR) Misc, use to check blood glucose 2 times a day, Disp: 1 each, Rfl: 0    blood-glucose meter (ACCU-CHEK GUIDE ME GLUCOSE MTR) Misc, To check blood glucose 1-2 times daily, to use with insurance preferred meter, Disp: 1 each, Rfl: 0    diphenhydrAMINE-aluminum-magnesium hydroxide-simethicone-LIDOcaine viscous HCl 2%, Swish and spit 15 mLs every 4 (four) hours as needed., Disp: 500 each, Rfl: 3    eszopiclone (LUNESTA) 3 mg Tab, Take 1 tablet (3 mg total) by mouth every evening., Disp: 30 tablet, Rfl: 1    lancets Misc, Use to test blood glucose 1-2 times a day, discard lancet after each use, Disp: 200 each, Rfl: 3    LORazepam (ATIVAN) 1 MG tablet, Take 1 tablet (1 mg total) by mouth 2 (two) times daily., Disp: 60 tablet, Rfl: 1    losartan (COZAAR) 25 MG tablet, Take 1 tablet (25 mg total) by mouth once daily., Disp: 90 tablet, Rfl: 2    magic mouthwash diphen/antac/lidoc, swish and spit 15ml every 4 hours as needed, Disp: 450 mL, Rfl: 3    metoprolol succinate (TOPROL-XL) 50 MG 24 hr tablet, Take 1½ tablets (75 mg total) by mouth once daily., Disp: 135 tablet, Rfl: 2    OLANZapine (ZYPREXA) 5 MG tablet, Take 1 tablet (5 mg total) by mouth every evening. On days 1-3 of each chemotherapy cycle., Disp: 12 tablet, Rfl: 1    omeprazole (PRILOSEC) 20 MG capsule, Take 1 capsule (20 mg total) by mouth once daily., Disp: 90 capsule, Rfl: 3    oxyCODONE-acetaminophen (PERCOCET)  mg per tablet, Take 1 tablet by mouth every 4 (four) hours as needed for Pain., Disp: 40  tablet, Rfl: 0    prochlorperazine (COMPAZINE) 5 MG tablet, Take 1 tablet (5 mg total) by mouth every 6 (six) hours as needed for Nausea., Disp: 20 tablet, Rfl: 5    semaglutide (OZEMPIC) 2 mg/dose (8 mg/3 mL) PnIj, Inject 2 mg into the skin every 7 days., Disp: 9 mL, Rfl: 0    tamsulosin (FLOMAX) 0.4 mg Cap, Take 1 capsule (0.4 mg total) by mouth once daily., Disp: 30 capsule, Rfl: 11    traMADoL (ULTRAM) 50 mg tablet, Take 1 tablet (50 mg total) by mouth 2 (two) times a day., Disp: 60 each, Rfl: 0

## 2025-05-06 NOTE — PROCEDURES
----- Message from NAMRATA Lackey sent at 5/6/2025 12:50 PM CDT -----  Normal sodium and potassium.  Normal blood sugar.  Normal kidney function.  Normal liver function. Normal blood count with no anemia.  Lipid panel looks good, you can review the numbers with him.   Sports Medicine US - Guidance for Needle Placement    Date/Time: 2/26/2025 1:40 PM    Performed by: Naman Barton MD  Authorized by: Naman Barton MD  Preparation: Patient was prepped and draped in the usual sterile fashion.  Local anesthesia used: no    Anesthesia:  Local anesthesia used: no    Sedation:  Patient sedated: no    Patient tolerance: patient tolerated the procedure well with no immediate complications  Comments: Ultrasound guidance was used for needle localization. Images were saved and stored for documentation. The appropriate structures were visualized. Dynamic visualization of the needle was continuous throughout the procedures and maintained good position.

## 2025-05-07 ENCOUNTER — OFFICE VISIT (OUTPATIENT)
Dept: RADIATION ONCOLOGY | Facility: CLINIC | Age: 75
End: 2025-05-07
Payer: MEDICARE

## 2025-05-07 VITALS
OXYGEN SATURATION: 95 % | SYSTOLIC BLOOD PRESSURE: 122 MMHG | BODY MASS INDEX: 24.91 KG/M2 | DIASTOLIC BLOOD PRESSURE: 81 MMHG | WEIGHT: 135.38 LBS | RESPIRATION RATE: 18 BRPM | HEART RATE: 100 BPM | TEMPERATURE: 97 F | HEIGHT: 62 IN

## 2025-05-07 DIAGNOSIS — C34.92 ADENOCARCINOMA OF LEFT LUNG: Primary | ICD-10-CM

## 2025-05-07 PROCEDURE — 3079F DIAST BP 80-89 MM HG: CPT | Mod: CPTII,S$GLB,, | Performed by: SPECIALIST

## 2025-05-07 PROCEDURE — 3074F SYST BP LT 130 MM HG: CPT | Mod: CPTII,S$GLB,, | Performed by: SPECIALIST

## 2025-05-07 PROCEDURE — 1159F MED LIST DOCD IN RCRD: CPT | Mod: CPTII,S$GLB,, | Performed by: SPECIALIST

## 2025-05-07 PROCEDURE — 1125F AMNT PAIN NOTED PAIN PRSNT: CPT | Mod: CPTII,S$GLB,, | Performed by: SPECIALIST

## 2025-05-07 PROCEDURE — 99024 POSTOP FOLLOW-UP VISIT: CPT | Mod: S$GLB,,, | Performed by: SPECIALIST

## 2025-05-07 PROCEDURE — 3288F FALL RISK ASSESSMENT DOCD: CPT | Mod: CPTII,S$GLB,, | Performed by: SPECIALIST

## 2025-05-07 PROCEDURE — 1101F PT FALLS ASSESS-DOCD LE1/YR: CPT | Mod: CPTII,S$GLB,, | Performed by: SPECIALIST

## 2025-05-07 PROCEDURE — 99999 PR PBB SHADOW E&M-EST. PATIENT-LVL IV: CPT | Mod: PBBFAC,,, | Performed by: SPECIALIST

## 2025-05-07 PROCEDURE — 3044F HG A1C LEVEL LT 7.0%: CPT | Mod: CPTII,S$GLB,, | Performed by: SPECIALIST

## 2025-05-07 RX ORDER — OXYCODONE AND ACETAMINOPHEN 10; 325 MG/1; MG/1
1 TABLET ORAL EVERY 4 HOURS PRN
Qty: 40 TABLET | Refills: 0 | Status: SHIPPED | OUTPATIENT
Start: 2025-05-07

## 2025-05-07 NOTE — PROGRESS NOTES
Mrs. Canela is recovering well from her chemoradiotherapy.  She continues to have esophageal discomfort for which she takes 1 Percocet at night.  Otherwise she is without complaints.  Physical exam: She has no supraclavicular adenopathy.  Her lungs are clear.  Impression: She is recovering well from treatment.  I understand she has a year of infusional therapy per Dr. Moran.  Plan: I have refilled her Percocet.  I will see her back in 6 months.  I appreciate participating in this delightful woman's care.

## 2025-05-12 ENCOUNTER — OFFICE VISIT (OUTPATIENT)
Dept: PULMONOLOGY | Facility: CLINIC | Age: 75
End: 2025-05-12
Payer: MEDICARE

## 2025-05-12 ENCOUNTER — HOSPITAL ENCOUNTER (OUTPATIENT)
Dept: RADIOLOGY | Facility: HOSPITAL | Age: 75
Discharge: HOME | End: 2025-05-12
Attending: INTERNAL MEDICINE
Payer: MEDICARE

## 2025-05-12 VITALS
BODY MASS INDEX: 25.13 KG/M2 | OXYGEN SATURATION: 97 % | SYSTOLIC BLOOD PRESSURE: 108 MMHG | WEIGHT: 136.56 LBS | HEART RATE: 107 BPM | RESPIRATION RATE: 20 BRPM | DIASTOLIC BLOOD PRESSURE: 72 MMHG | HEIGHT: 62 IN

## 2025-05-12 DIAGNOSIS — R94.6 ABNORMAL RESULTS OF THYROID FUNCTION STUDIES: ICD-10-CM

## 2025-05-12 DIAGNOSIS — D84.822 IMMUNODEFICIENCY SECONDARY TO RADIATION THERAPY: ICD-10-CM
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
## 2025-05-12 DIAGNOSIS — J42 CHRONIC BRONCHITIS, UNSPECIFIED CHRONIC BRONCHITIS TYPE: ICD-10-CM

## 2025-05-12 DIAGNOSIS — E78.5 HYPERLIPIDEMIA ASSOCIATED WITH TYPE 2 DIABETES MELLITUS: Chronic | ICD-10-CM

## 2025-05-12 DIAGNOSIS — J45.20 MILD INTERMITTENT ASTHMA WITHOUT COMPLICATION: ICD-10-CM

## 2025-05-12 DIAGNOSIS — E11.69 HYPERLIPIDEMIA ASSOCIATED WITH TYPE 2 DIABETES MELLITUS: Chronic | ICD-10-CM

## 2025-05-12 DIAGNOSIS — I10 PRIMARY HYPERTENSION: ICD-10-CM

## 2025-05-12 DIAGNOSIS — T45.1X5A IMMUNODEFICIENCY DUE TO CHEMOTHERAPY: ICD-10-CM

## 2025-05-12 DIAGNOSIS — E11.9 CONTROLLED TYPE 2 DIABETES MELLITUS WITHOUT COMPLICATION, WITH LONG-TERM CURRENT USE OF INSULIN: ICD-10-CM

## 2025-05-12 DIAGNOSIS — J18.9 MULTIFOCAL PNEUMONIA: Primary | ICD-10-CM

## 2025-05-12 DIAGNOSIS — C34.32 MALIGNANT NEOPLASM OF LOWER LOBE OF LEFT LUNG: ICD-10-CM

## 2025-05-12 DIAGNOSIS — D84.821 IMMUNODEFICIENCY DUE TO CHEMOTHERAPY: ICD-10-CM

## 2025-05-12 DIAGNOSIS — Z79.69 IMMUNODEFICIENCY DUE TO CHEMOTHERAPY: ICD-10-CM

## 2025-05-12 DIAGNOSIS — J98.4 CALCIFIED GRANULOMA OF LUNG: ICD-10-CM

## 2025-05-12 DIAGNOSIS — Z79.4 CONTROLLED TYPE 2 DIABETES MELLITUS WITHOUT COMPLICATION, WITH LONG-TERM CURRENT USE OF INSULIN: ICD-10-CM

## 2025-05-12 PROCEDURE — 99214 OFFICE O/P EST MOD 30 MIN: CPT | Mod: S$GLB,,, | Performed by: INTERNAL MEDICINE

## 2025-05-12 PROCEDURE — A9698 NON-RAD CONTRAST MATERIALNOC: HCPCS | Performed by: INTERNAL MEDICINE

## 2025-05-12 PROCEDURE — 71260 CT THORAX DX C+: CPT | Mod: 26,,, | Performed by: RADIOLOGY

## 2025-05-12 PROCEDURE — 3074F SYST BP LT 130 MM HG: CPT | Mod: CPTII,S$GLB,, | Performed by: INTERNAL MEDICINE

## 2025-05-12 PROCEDURE — 1160F RVW MEDS BY RX/DR IN RCRD: CPT | Mod: CPTII,S$GLB,, | Performed by: INTERNAL MEDICINE

## 2025-05-12 PROCEDURE — 74177 CT ABD & PELVIS W/CONTRAST: CPT | Mod: 26,,, | Performed by: RADIOLOGY

## 2025-05-12 PROCEDURE — 1126F AMNT PAIN NOTED NONE PRSNT: CPT | Mod: CPTII,S$GLB,, | Performed by: INTERNAL MEDICINE

## 2025-05-12 PROCEDURE — 25500020 PHARM REV CODE 255: Performed by: INTERNAL MEDICINE

## 2025-05-12 PROCEDURE — 3288F FALL RISK ASSESSMENT DOCD: CPT | Mod: CPTII,S$GLB,, | Performed by: INTERNAL MEDICINE

## 2025-05-12 PROCEDURE — 3044F HG A1C LEVEL LT 7.0%: CPT | Mod: CPTII,S$GLB,, | Performed by: INTERNAL MEDICINE

## 2025-05-12 PROCEDURE — 99999 PR PBB SHADOW E&M-EST. PATIENT-LVL V: CPT | Mod: PBBFAC,,, | Performed by: INTERNAL MEDICINE

## 2025-05-12 PROCEDURE — 1159F MED LIST DOCD IN RCRD: CPT | Mod: CPTII,S$GLB,, | Performed by: INTERNAL MEDICINE

## 2025-05-12 PROCEDURE — 71260 CT THORAX DX C+: CPT | Mod: TC

## 2025-05-12 PROCEDURE — 3078F DIAST BP <80 MM HG: CPT | Mod: CPTII,S$GLB,, | Performed by: INTERNAL MEDICINE

## 2025-05-12 PROCEDURE — 1101F PT FALLS ASSESS-DOCD LE1/YR: CPT | Mod: CPTII,S$GLB,, | Performed by: INTERNAL MEDICINE

## 2025-05-12 RX ORDER — IPRATROPIUM BROMIDE AND ALBUTEROL SULFATE 2.5; .5 MG/3ML; MG/3ML
3 SOLUTION RESPIRATORY (INHALATION) 2 TIMES DAILY
Qty: 180 ML | Refills: 11 | Status: SHIPPED | OUTPATIENT
Start: 2025-05-12 | End: 2026-05-12

## 2025-05-12 RX ORDER — LEVOFLOXACIN 500 MG/1
500 TABLET, FILM COATED ORAL DAILY
Qty: 10 TABLET | Refills: 0 | Status: SHIPPED | OUTPATIENT
Start: 2025-05-12 | End: 2025-05-22

## 2025-05-12 RX ORDER — METHYLPREDNISOLONE 4 MG/1
TABLET ORAL
Qty: 21 TABLET | Refills: 0 | Status: SHIPPED | OUTPATIENT
Start: 2025-05-12

## 2025-05-12 RX ADMIN — IOHEXOL 1000 ML: 12 SOLUTION ORAL at 01:05

## 2025-05-12 RX ADMIN — IOHEXOL 100 ML: 350 INJECTION, SOLUTION INTRAVENOUS at 01:05

## 2025-05-12 NOTE — PROGRESS NOTES
Pulmonary Outpatient  Visit     Subjective:       Patient ID: Cassandra Campos is a 74 y.o. female.    Social History     Tobacco Use   Smoking Status Former    Current packs/day: 0.00    Average packs/day: 0.5 packs/day for 42.0 years (21.0 ttl pk-yrs)    Types: Cigarettes    Start date: 1970    Quit date: 2012    Years since quittin.3    Passive exposure: Past   Smokeless Tobacco Never            Chief Complaint: hospital followup          Cassandra Campos is 73 y.o.  Asked to see by Dr Ball  Abn imaging CT abdomen   No cough no wheezing no shortness of breath   Last seen in this department in to any to any   History of asthma on inhaler   Former smoker quit 20 years ago   Retired teacher   No past no history of cancer   No family history of cancer   Sinus congestion - hole in nose from snorting cocaine  Smoked 1ppd for many years - not smoking now, 15 pack years  Denies weight loss hemoptysis no TB exposure       Imaging reviewed with patient          2024  Followup  Revewed PFT  ABG  Walk  CT biopsy for   Will do EBUS on 04/10  Will need cardiology clearance      2024  Followup  Recent PET CT  Left Hilar increased avidity  FDG avid left hilar adenopathy/mass now demonstrating an SUV max of 9.4 as compared to 4.7 on the prior exam.  Images reveiwed with patient    Path  Regional LN were -ve  REGIONAL LYMPH NODES   Regional Lymph Node Status:  Regional lymph nodes present and negative for tumor      Number of Lymph Nodes with Tumor:  0      Number of Lymph Nodes Examined:  5      Xavi Sites Examined:  Level 10, level 9, level 6, level 5, level 4L     02/10/2025   Follow-up visit   Hilar biopsies were positive for local recurrence of resected lung cancer   +ve 10 L  Planned XRT  No respiratory issues  Currently taking lorazepam, Lunesta and Elavil for sleep.    2025   Follow-up visit   Recent hospitalization  Multifocal  pneumonia  Abx Cefdnir and doxy  Cough, congestioon, wheezing at night  Earlier this year she completed radiation therapy   No fever currently   Wheezing at night   Yellow sputum   Chest CT reviewed with patient             [facility] Medicine  Discharge Summary        Patient Name: Cassandra Campos  MRN: [id number]  DANIEL: [id number]  Patient Class: IP- Inpatient  Admission Date: 4/1/2025  Hospital Length of Stay: 2 days  Discharge Date and Time: 04/06/2025 4:30 PM  Attending Physician: No att. providers found   Discharging Provider: Win Pace MD  Primary Care Provider: Emil Zhang MD     Primary Care Team: Networked reference to record PCT      HPI:   Cassandra Campos is a 74 y.o. female with a PMH  has a past medical history of Arthritis, Bilateral bunions, Borderline glaucoma, De Quervain's disease (radial styloid tenosynovitis), Gastritis, Hydradenitis, Hyperlipidemia, Hypertension, Insomnia, Lung cancer, Migraines (02/01/2000), Nasal septum perforation, Obesity, Pneumonia, Restrictive airway disease, Sleep apnea, SVT (supraventricular tachycardia) (09/2013), Trigger finger, and Type 2 diabetes mellitus (2012).  Presented to the ER for evaluation for low blood pressure.  Patient was sent over from Dr. Zhang's office where she [facility], but her session was held due to her low BP reading in the office.  Patient reports she felt more fatigued with shortness of breath mild chest pain, productive cough, and lightheadedness/dizziness which started yesterday.  Denied any specific alleviating or aggravating factors.  Denies any fevers, aches, chills, sweats, nausea, vomiting, diarrhea, abdominal pain, or any other symptoms at this time.     ER workup revealed CBC to be at baseline.  CMP revealed CBG of 154 mg/dL AST/ALT of 177/163.  Troponin 0.276.  Procalcitonin level 6.41.  Lactic acid level 5.1 with repeat 2.3.  Flu/COVID  negative.  UA unremarkable.  Blood cultures pending.  Chest x-ray showed no acute infiltrates.  CT chest abdomen and pelvis with IV contrast revealed multifocal ground-glass opacities to left lung most prominent in the lower lobe infectious versus inflammatory process.  EKG revealed sinus tachycardia with a ventricular rate of 110 beats per minute QT/QTC of 342/462.  Patient received 2 g of cefepime and 1,182 cc normal saline in the ER.  Hospital Medicine consulted to admit patient for sepsis likely secondary to multifocal pneumonia.  Patient in agreement with treatment plan.  Patient admitted under inpatient status.     PCP: Emil Zhang     * No surgery found *       Hospital Course:   The patient presented with weakness and hypotension. She was placed on fluids and empiric antibiotics. Oncology was consulted as she recently completed round of chemotherapy. Her symptoms improved. She continued radiation while inpatient. She was transitioned to oral antibiotics upon discharge. OP follow-up with oncology and rad-onc as previously scheduled. Patient seen and examined, stable for discharge.       Goals of Care Treatment Preferences:  Code Status: Full Code     What is most important right now is to focus on remaining as independent as possible, curative/life-prolongation (regardless of treatment burdens).  Accordingly, we have decided that the best plan to meet the patient's goals includes continuing with treatment.           Review of Systems   Constitutional: Negative.    Eyes: Negative.    Respiratory:  Positive for cough, sputum production and wheezing. Negative for snoring, shortness of breath, use of rescue inhaler and somnolence.    Cardiovascular: Negative.    Genitourinary: Negative.    Endocrine: endocrine negative    Musculoskeletal: Negative.    Skin: Negative.    Gastrointestinal: Negative.    Psychiatric/Behavioral: Negative.     All other systems reviewed and are negative.      Outpatient Encounter  Medications as of 5/12/2025   Medication Sig Dispense Refill    albuterol (PROVENTIL/VENTOLIN HFA) 90 mcg/actuation inhaler INHALE 2 PUFFS INTO THE LUNGS EVERY 4 HOURS AS NEEDED FOR WHEEZING OR SHORTNESS OF BREATH. 18 g 1    amitriptyline (ELAVIL) 25 MG tablet Take 1 tablet (25 mg total) by mouth every evening. 90 tablet 1    aspirin (ECOTRIN) 81 MG EC tablet Take 1 tablet (81 mg total) by mouth once daily.      blood sugar diagnostic Strp use to test blood sugar 1-2 times a day 200 strip 3    blood-glucose meter (ACCU-CHEK GUIDE ME GLUCOSE MTR) Misc use to check blood glucose 2 times a day 1 each 0    blood-glucose meter (ACCU-CHEK GUIDE ME GLUCOSE MTR) Misc To check blood glucose 1-2 times daily, to use with insurance preferred meter 1 each 0    diphenhydrAMINE-aluminum-magnesium hydroxide-simethicone-LIDOcaine viscous HCl 2% Swish and spit 15 mLs every 4 (four) hours as needed. 500 each 3    eszopiclone (LUNESTA) 3 mg Tab Take 1 tablet (3 mg total) by mouth every evening. 30 tablet 1    lancets Misc Use to test blood glucose 1-2 times a day, discard lancet after each use 200 each 3    LORazepam (ATIVAN) 1 MG tablet Take 1 tablet (1 mg total) by mouth 2 (two) times daily. 60 tablet 1    losartan (COZAAR) 25 MG tablet Take 1 tablet (25 mg total) by mouth once daily. 90 tablet 2    magic mouthwash diphen/antac/lidoc swish and spit 15ml every 4 hours as needed 450 mL 3    metoprolol succinate (TOPROL-XL) 50 MG 24 hr tablet Take 1½ tablets (75 mg total) by mouth once daily. 135 tablet 2    OLANZapine (ZYPREXA) 5 MG tablet Take 1 tablet (5 mg total) by mouth every evening. On days 1-3 of each chemotherapy cycle. 12 tablet 1    omeprazole (PRILOSEC) 20 MG capsule Take 1 capsule (20 mg total) by mouth once daily. 90 capsule 3    oxyCODONE-acetaminophen (PERCOCET)  mg per tablet Take 1 tablet by mouth every 4 (four) hours as needed for Pain. 40 tablet 0    prochlorperazine (COMPAZINE) 5 MG tablet Take 1 tablet (5 mg  total) by mouth every 6 (six) hours as needed for Nausea. 20 tablet 5    semaglutide (OZEMPIC) 2 mg/dose (8 mg/3 mL) PnIj Inject 2 mg into the skin every 7 days. 9 mL 0    tamsulosin (FLOMAX) 0.4 mg Cap Take 1 capsule (0.4 mg total) by mouth once daily. 30 capsule 11    traMADoL (ULTRAM) 50 mg tablet Take 1 tablet (50 mg total) by mouth 2 (two) times a day. 60 each 0    albuterol-ipratropium (DUO-NEB) 2.5 mg-0.5 mg/3 mL nebulizer solution Take 3 mLs by nebulization 2 (two) times a day. Rescue 180 mL 11    [] cefdinir (OMNICEF) 300 MG capsule Take 1 capsule (300 mg total) by mouth 2 (two) times daily. for 10 days 20 capsule 0    levoFLOXacin (LEVAQUIN) 500 MG tablet Take 1 tablet (500 mg total) by mouth once daily. for 10 days 10 tablet 0    methylPREDNISolone (MEDROL DOSEPACK) 4 mg tablet use as directed 21 tablet 0    [] traMADoL (ULTRAM) 50 mg tablet Take 1 tablet (50 mg total) by mouth every 12 (twelve) hours as needed for Pain. Greater than 7 day supply medically necessary. 60 tablet 2    [DISCONTINUED] doxycycline (VIBRA-TABS) 100 MG tablet Take 1 tablet (100 mg total) by mouth 2 (two) times daily. (Patient not taking: Reported on 2025) 20 tablet 0    [DISCONTINUED] omeprazole (PRILOSEC) 20 MG capsule Take 1 capsule (20 mg total) by mouth once daily. 90 capsule 2    [DISCONTINUED] oxyCODONE-acetaminophen (PERCOCET)  mg per tablet Take 1 tablet by mouth every 4 (four) hours as needed for Pain. 40 tablet 0    [DISCONTINUED] oxyCODONE-acetaminophen (PERCOCET)  mg per tablet Take 1 tablet by mouth every 4 (four) hours as needed for Pain. 40 tablet 0    [DISCONTINUED] triamcinolone acetonide 0.025 % Lotn Apply small amount to affected areas twice a day.  Take cool showers or baths (Patient not taking: Reported on 2025) 60 mL 1     Facility-Administered Encounter Medications as of 2025   Medication Dose Route Frequency Provider Last Rate Last Admin    [COMPLETED] iohexoL  (OMNIPAQUE 12) oral solution 1,000 mL  1,000 mL Oral ONCE PRN Emerson Moran MD   1,000 mL at 25 1305    [COMPLETED] iohexoL (OMNIPAQUE 350) injection 100 mL  100 mL Intravenous ONCE PRN Emerson Moran MD   100 mL at 25 1305       The following portions of the patient's history were reviewed and updated as appropriate: She  has a past medical history of Arthritis, Bilateral bunions, Borderline glaucoma, De Quervain's disease (radial styloid tenosynovitis), Gastritis, Hydradenitis, Hyperlipidemia, Hypertension, Insomnia, Lung cancer, Migraines (2000), Nasal septum perforation, Obesity, Pneumonia, Restrictive airway disease, Sleep apnea, SVT (supraventricular tachycardia) (2013), Trigger finger, and Type 2 diabetes mellitus ().  She does not have any pertinent problems on file.  She  has a past surgical history that includes Cholecystectomy (2014); Carpal tunnel release; Axillary hidradenitis excision (Bilateral); Trigger finger release (Right, 2015); Cyst Removal (2015); TONSILLECTOMY, ADENOIDECTOMY ();  section (); gastric sleeve (2017); Knee surgery (Right); Cataract extraction (Bilateral); Breast biopsy (Bilateral); Olecranon bursectomy (Right, 2018); Bone exostosis excision (Right, 2018); Surgical removal of bunion with osteotomy of metatarsal bone (Left, 05/10/2019); Surgical removal of bunion with osteotomy of metatarsal bone (Right, 2019); De Quervain's release (Left, 2020); Colonoscopy w/ polypectomy (10/02/2020); Colonoscopy (N/A, 10/02/2020); De Quervain's release (Right, 2020); Eye surgery; Breast surgery (1998); Carpal tunnel release (Right, 2024); Carpal tunnel release (Left, 2024); Endobronchial ultrasound (Bilateral, 04/10/2024); transesophageal echocardiogram with possible cardioversion (romero w/ poss cardioversion) (N/A, 05/15/2024); xi robotic rats,with lobectomy,lung (Left, 05/10/2024);  Surgical removal of lymph node (Left, 05/10/2024); Injection of anesthetic agent around multiple intercostal nerves (05/10/2024); Bronchoscopy (Bilateral, 01/15/2025); Endobronchial ultrasound (Bilateral, 01/15/2025); and Breast lumpectomy (07/1998).  Her family history includes Diabetes in her cousin and maternal aunt; Hypertension in her maternal grandmother; Prostate cancer in her brother.  She  reports that she quit smoking about 13 years ago. Her smoking use included cigarettes. She started smoking about 55 years ago. She has a 21 pack-year smoking history. She has been exposed to tobacco smoke. She has never used smokeless tobacco. She reports that she does not currently use alcohol after a past usage of about 1.0 standard drink of alcohol per week. She reports that she does not use drugs.  She has a current medication list which includes the following prescription(s): albuterol, amitriptyline, aspirin, blood sugar diagnostic, blood-glucose meter, blood-glucose meter, diphenhydrAMINE-aluminum-magnesium hydroxide-simethicone-LIDOcaine viscous HCl 2%, eszopiclone, lancets, lorazepam, losartan, magic mouthwash diphen/antac/lidoc, metoprolol succinate, olanzapine, omeprazole, oxycodone-acetaminophen, prochlorperazine, ozempic, tamsulosin, tramadol, albuterol-ipratropium, levofloxacin, and methylprednisolone.  Current Outpatient Medications on File Prior to Visit   Medication Sig Dispense Refill    albuterol (PROVENTIL/VENTOLIN HFA) 90 mcg/actuation inhaler INHALE 2 PUFFS INTO THE LUNGS EVERY 4 HOURS AS NEEDED FOR WHEEZING OR SHORTNESS OF BREATH. 18 g 1    amitriptyline (ELAVIL) 25 MG tablet Take 1 tablet (25 mg total) by mouth every evening. 90 tablet 1    aspirin (ECOTRIN) 81 MG EC tablet Take 1 tablet (81 mg total) by mouth once daily.      blood sugar diagnostic Strp use to test blood sugar 1-2 times a day 200 strip 3    blood-glucose meter (ACCU-CHEK GUIDE ME GLUCOSE MTR) Misc use to check blood glucose 2  times a day 1 each 0    blood-glucose meter (ACCU-CHEK GUIDE ME GLUCOSE MTR) Misc To check blood glucose 1-2 times daily, to use with insurance preferred meter 1 each 0    diphenhydrAMINE-aluminum-magnesium hydroxide-simethicone-LIDOcaine viscous HCl 2% Swish and spit 15 mLs every 4 (four) hours as needed. 500 each 3    eszopiclone (LUNESTA) 3 mg Tab Take 1 tablet (3 mg total) by mouth every evening. 30 tablet 1    lancets Misc Use to test blood glucose 1-2 times a day, discard lancet after each use 200 each 3    LORazepam (ATIVAN) 1 MG tablet Take 1 tablet (1 mg total) by mouth 2 (two) times daily. 60 tablet 1    losartan (COZAAR) 25 MG tablet Take 1 tablet (25 mg total) by mouth once daily. 90 tablet 2    magic mouthwash diphen/antac/lidoc swish and spit 15ml every 4 hours as needed 450 mL 3    metoprolol succinate (TOPROL-XL) 50 MG 24 hr tablet Take 1½ tablets (75 mg total) by mouth once daily. 135 tablet 2    OLANZapine (ZYPREXA) 5 MG tablet Take 1 tablet (5 mg total) by mouth every evening. On days 1-3 of each chemotherapy cycle. 12 tablet 1    omeprazole (PRILOSEC) 20 MG capsule Take 1 capsule (20 mg total) by mouth once daily. 90 capsule 3    oxyCODONE-acetaminophen (PERCOCET)  mg per tablet Take 1 tablet by mouth every 4 (four) hours as needed for Pain. 40 tablet 0    prochlorperazine (COMPAZINE) 5 MG tablet Take 1 tablet (5 mg total) by mouth every 6 (six) hours as needed for Nausea. 20 tablet 5    semaglutide (OZEMPIC) 2 mg/dose (8 mg/3 mL) PnIj Inject 2 mg into the skin every 7 days. 9 mL 0    tamsulosin (FLOMAX) 0.4 mg Cap Take 1 capsule (0.4 mg total) by mouth once daily. 30 capsule 11    traMADoL (ULTRAM) 50 mg tablet Take 1 tablet (50 mg total) by mouth 2 (two) times a day. 60 each 0     No current facility-administered medications on file prior to visit.     She has no known allergies..      BP Readings from Last 3 Encounters:   05/12/25 108/72   05/07/25 122/81   05/01/25 110/79     Snoring /  "Sleep:            MMRC Dyspnea Scale (4 is worst)     [x] MMRC 0: Dyspneic on strenuous excercise (0 points)    [] MMRC 1: Dyspneic on walking a slight hill (0 points)    [] MMRC 2: Dyspneic on walking level ground; must stop occasionally due to breathlessness (1 point)    [] MMRC 3: Must stop for breathlessness after walking 100 yards or after a few minutes (2 points)    [] MMRC 4: Cannot leave house; breathless on dressing/undressing (3 points)       Asthma Control Test  In the past 4  weeks, how much of the time did your asthma keep you from getting as much done at work, school or at home?: None of the time  During the past 4 weeks, how often have you had shortness of breath?: Not at all  During the past 4 weeks, how often did your asthma symptoms (wheezing, couging, shortness of breath, chest tightness or pain) wake you up at night or earlier than usual in the morning?: 2 or 3 nights a week  During the past 4 weeks, how often have you used your rescue inhaler or nebulizer medication (such as albuterol)?: Once a week or less  How would you rate your asthma control during the past 4 weeks?: Well controlled  If your score is 19 or less, your asthma may not be under control: 20                  No data to display                          Objective:     Vital Signs (Most Recent)  Vital Signs  Pulse: 107  Resp: 20  SpO2: 97 %  BP: 108/72  Patient Position: Sitting  Pain Score: 0-No pain  Height and Weight  Height: 5' 2" (157.5 cm)  Weight: 62 kg (136 lb 9.2 oz)  BSA (Calculated - sq m): 1.65 sq meters  BMI (Calculated): 25  Weight in (lb) to have BMI = 25: 136.4]  Wt Readings from Last 2 Encounters:   05/12/25 62 kg (136 lb 9.2 oz)   05/07/25 61.4 kg (135 lb 5.8 oz)       Physical Exam  Vitals and nursing note reviewed.   Constitutional:       Appearance: She is normal weight.   HENT:      Head: Normocephalic and atraumatic.      Nose: Nose normal.   Eyes:      Pupils: Pupils are equal, round, and reactive to light. " "  Cardiovascular:      Rate and Rhythm: Normal rate and regular rhythm.      Pulses: Normal pulses.      Heart sounds: Normal heart sounds.   Pulmonary:      Effort: Pulmonary effort is normal.      Breath sounds: Wheezing and rhonchi present.   Abdominal:      General: Bowel sounds are normal.      Palpations: Abdomen is soft.   Musculoskeletal:      Cervical back: Normal range of motion.   Skin:     General: Skin is warm.   Neurological:      General: No focal deficit present.      Mental Status: She is alert and oriented to person, place, and time.   Psychiatric:         Mood and Affect: Mood normal.          Laboratory  Lab Results   Component Value Date    WBC 5.33 04/25/2025    RBC 4.87 04/25/2025    HGB 13.2 04/25/2025    HCT 38.4 04/25/2025    MCV 79 (L) 04/25/2025    MCH 27.1 04/25/2025    MCHC 34.4 04/25/2025    RDW 19.4 (H) 04/25/2025     04/25/2025    MPV 8.3 (L) 04/25/2025    GRAN 1.2 04/01/2025    LYMPH 31.0 04/25/2025    LYMPH 1.65 04/25/2025    MONO 14.1 04/25/2025    MONO 0.75 04/25/2025    EOS 0.2 04/25/2025    EOS 0.01 04/25/2025    BASO 0.02 03/21/2025    EOSINOPHIL 0.4 03/21/2025    BASOPHIL 0.9 04/25/2025    BASOPHIL 0.05 04/25/2025       BMP  Lab Results   Component Value Date     04/25/2025    K 3.3 (L) 04/25/2025     04/25/2025    CO2 22 (L) 04/25/2025    BUN 6 (L) 04/25/2025    CREATININE 0.6 04/25/2025    CALCIUM 9.4 04/25/2025    ANIONGAP 9 04/25/2025    ESTGFRAFRICA >60 06/21/2022    EGFRNONAA >60 06/21/2022    AST 29 04/25/2025    ALT 20 04/25/2025    PROT 8.0 04/25/2025          No results found for: "IGE"     No results found for: "ASPERGILLUS"  No results found for: "AFUMIGATUSCL"     No results found for: "ACE"     Diagnostic Results:  I have personally reviewed today the following studies:    CT Chest Abdomen Pelvis With IV Contrast (XPD) Routine Oral Contrast  Narrative: EXAM: CT CHEST ABDOMEN PELVIS WITH IV CONTRAST (XPD)    CLINICAL HISTORY: " Sepsis    COMPARISON: 12/11/2024    TECHNIQUE: Standard thin-section axial images, with reformatted sagittal and coronal images.    FINDINGS: Chest: There is no mediastinal, hilar or axillary lymphadenopathy.  No pericardial effusion.  Moderate severe coronary artery calcification.    Multifocal groundglass opacities throughout the left lung most pronounced in the lower lobe.  Additional linear bands of atelectasis in the left upper lobe posteriorly extending to the major fissure.    Minimal atelectasis at the right lung base.  No pleural effusions.  No pulmonary nodules.    ABDOMEN: Cholecystectomy.  Liver, spleen, pancreas, adrenals, kidneys and ureters are unremarkable.    Vascular structures are unremarkable with the exception of atherosclerotic calcification.    No abdominal or retroperitoneal lymphadenopathy.  No ascites or fat stranding within the abdomen.  No dilatation of the large or small bowel.  No evidence for appendicitis.  Postsurgical changes at the stomach.    Pelvis: Small amount of air within the bladder.  No pelvic free fluid, iliac chain or inguinal lymphadenopathy.  Atrophic uterus and adnexal regions are unremarkable.    Osseous structures: Small fat-containing anterior abdominal wall defect just below the umbilicus without evidence for incarceration.  No concerning lytic or sclerotic bony lesions.  Impression: 1.  Multifocal groundglass opacities throughout the left lung most pronounced in the lower lobe, infectious or inflammatory.  2.  Otherwise, no additional acute findings.    All CT scans at [this location] are performed using dose modulation techniques as appropriate to a performed exam including the following:  Automated exposure control; adjustment of the mA and/or kV according to patient size (this includes techniques or standardized protocols for targeted exams where dose is matched to indication / reason for exam; i.e. extremities or head); use of iterative reconstruction  "technique.    Finalized on: 4/1/2025 6:16 PM By:  Raymond Boggs  [facility]# [id number]      2025-04-01 18:18:33.030     [facility]  X-Ray Chest AP Portable  Narrative: EXAMINATION:  XR CHEST AP PORTABLE    CLINICAL HISTORY:  , Sepsis;    COMPARISON:  Chest, 02/27/2025    FINDINGS:  One view.  Stable heart size.  Discoid atelectasis mid left lung.  No definite infiltrates.  Impression: No acute infiltrates.    Electronically signed by: Juliette Prakash MD  Date:    04/01/2025  Time:    12:44         No results for input(s): "PH", "PCO2", "PO2", "HCO3", "POCSATURATED", "BE" in the last 72 hours.                        Assessment/Plan:     Problem List Items Addressed This Visit       Hyperlipidemia associated with type 2 diabetes mellitus (Chronic)    Calcified granuloma of lung    Mild intermittent asthma    Relevant Orders    NEBULIZER KIT (SUPPLIES) FOR HOME USE    NEBULIZER FOR HOME USE    Chronic bronchitis, unspecified chronic bronchitis type    Relevant Medications    albuterol-ipratropium (DUO-NEB) 2.5 mg-0.5 mg/3 mL nebulizer solution    Controlled type 2 diabetes mellitus without complication, with long-term current use of insulin    Primary hypertension    Malignant neoplasm of lower lobe of left lung    Multifocal pneumonia - Primary    Relevant Medications    levoFLOXacin (LEVAQUIN) 500 MG tablet    methylPREDNISolone (MEDROL DOSEPACK) 4 mg tablet    albuterol-ipratropium (DUO-NEB) 2.5 mg-0.5 mg/3 mL nebulizer solution    Other Relevant Orders    Culture, Respiratory with Gram Stain    Culture, Respiratory with Gram Stain    Culture, Respiratory with Gram Stain    AFB Culture & Smear    AFB Culture & Smear    AFB Culture & Smear        Chest CT still shows multifocal opacities.    Presumptively infectious   Additional course of antibiotics given   Sputum sampling  May need flexible bronchoscopy if infection and resolving and cultures and yielding.    Nebulizer ordered for patient       Follow up in about 4 weeks " (around 6/9/2025), or nebulizer, sputum, abx,.    This note was prepared using voice recognition system and is likely to have sound alike errors that may have been overlooked even after proof reading.  Please call me with any questions    Discussed diagnosis, its evaluation, treatment and usual course. All questions answered.    Thank you for the courtesy of participating in the care of this patient    Adrian Robin MD      Personal Diagnostic Review  []  CXR    []  ECHO    []  ONSAT    []  6MWD    []  LABS    []  CHEST CT    []  PET CT    []  Biopsy results

## 2025-05-13 ENCOUNTER — RESULTS FOLLOW-UP (OUTPATIENT)
Dept: HEMATOLOGY/ONCOLOGY | Facility: CLINIC | Age: 75
End: 2025-05-13

## 2025-05-15 ENCOUNTER — RESULTS FOLLOW-UP (OUTPATIENT)
Dept: PULMONOLOGY | Facility: CLINIC | Age: 75
End: 2025-05-15
Payer: MEDICARE

## 2025-05-15 NOTE — LETTER
Sue 10, 2025    Cassandra Campos  [address]  [place] [address]             [facility] Pulmonary [facility]  [address]  [place] [address]  Phone: [phone]  Fax: [phone] Dear Mrs. Cassandra Campos:    Below are the results from your recent visit:    Resulted Orders   Culture, Respiratory with Gram Stain   Result Value Ref Range    Respiratory Culture Many Haemophilus influenzae (A)       Comment:      with normal respiratory iris  Beta-Lactamase Negative    GRAM STAIN <10 Epithelial Cells/LPF     GRAM STAIN Many WBC seen     GRAM STAIN Many Gram positive cocci     GRAM STAIN Many Gram Negative Rods    AFB Culture & Smear   Result Value Ref Range    CULTURE, AFB  Culture Negative For Mycobacteria At 4 Weeks    Afb Culture Stain   Result Value Ref Range    ACID FAST STAIN  No acid fast bacilli seen      Your results are within an acceptable range.  Please don't hesitate to call our office if you have any questions or concerns.      Sincerely,        Adrian Robin MD

## 2025-05-16 ENCOUNTER — LAB VISIT (OUTPATIENT)
Dept: LAB | Facility: HOSPITAL | Age: 75
End: 2025-05-16
Attending: INTERNAL MEDICINE
Payer: MEDICARE

## 2025-05-16 ENCOUNTER — RESULTS FOLLOW-UP (OUTPATIENT)
Dept: HEMATOLOGY/ONCOLOGY | Facility: CLINIC | Age: 75
End: 2025-05-16

## 2025-05-16 ENCOUNTER — OFFICE VISIT (OUTPATIENT)
Dept: HEMATOLOGY/ONCOLOGY | Facility: CLINIC | Age: 75
End: 2025-05-16
Attending: INTERNAL MEDICINE
Payer: MEDICARE

## 2025-05-16 VITALS
HEART RATE: 87 BPM | DIASTOLIC BLOOD PRESSURE: 77 MMHG | TEMPERATURE: 98 F | WEIGHT: 140.19 LBS | BODY MASS INDEX: 25.8 KG/M2 | SYSTOLIC BLOOD PRESSURE: 122 MMHG | HEIGHT: 62 IN | OXYGEN SATURATION: 96 %

## 2025-05-16 DIAGNOSIS — C34.32 MALIGNANT NEOPLASM OF LOWER LOBE OF LEFT LUNG: Primary | ICD-10-CM

## 2025-05-16 DIAGNOSIS — C34.32 MALIGNANT NEOPLASM OF LOWER LOBE OF LEFT LUNG: ICD-10-CM

## 2025-05-16 DIAGNOSIS — D84.822 IMMUNODEFICIENCY SECONDARY TO RADIATION THERAPY: ICD-10-CM

## 2025-05-16 DIAGNOSIS — Z79.69 IMMUNODEFICIENCY DUE TO CHEMOTHERAPY: ICD-10-CM

## 2025-05-16 DIAGNOSIS — T45.1X5A IMMUNODEFICIENCY DUE TO CHEMOTHERAPY: ICD-10-CM

## 2025-05-16 DIAGNOSIS — G47.00 INSOMNIA, UNSPECIFIED TYPE: ICD-10-CM

## 2025-05-16 DIAGNOSIS — F41.0 PANIC ATTACK AS REACTION TO STRESS: ICD-10-CM
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
## 2025-05-16 DIAGNOSIS — D84.821 IMMUNODEFICIENCY DUE TO CHEMOTHERAPY: ICD-10-CM

## 2025-05-16 DIAGNOSIS — F43.0 PANIC ATTACK AS REACTION TO STRESS: ICD-10-CM

## 2025-05-16 DIAGNOSIS — R94.6 ABNORMAL RESULTS OF THYROID FUNCTION STUDIES: ICD-10-CM

## 2025-05-16 LAB
ABSOLUTE EOSINOPHIL (OHS): 0.02 K/UL
ABSOLUTE MONOCYTE (OHS): 0.64 K/UL (ref 0.3–1)
ABSOLUTE NEUTROPHIL COUNT (OHS): 2.93 K/UL (ref 1.8–7.7)
ALBUMIN SERPL BCP-MCNC: 2.5 G/DL (ref 3.5–5.2)
ALP SERPL-CCNC: 82 UNIT/L (ref 40–150)
ALT SERPL W/O P-5'-P-CCNC: 77 UNIT/L (ref 10–44)
ANION GAP (OHS): 7 MMOL/L (ref 8–16)
AST SERPL-CCNC: 90 UNIT/L (ref 11–45)
BASOPHILS # BLD AUTO: 0.01 K/UL
BASOPHILS NFR BLD AUTO: 0.2 %
BILIRUB SERPL-MCNC: 0.2 MG/DL (ref 0.1–1)
BUN SERPL-MCNC: 12 MG/DL (ref 8–23)
CALCIUM SERPL-MCNC: 9 MG/DL (ref 8.7–10.5)
CHLORIDE SERPL-SCNC: 107 MMOL/L (ref 95–110)
CO2 SERPL-SCNC: 26 MMOL/L (ref 23–29)
CREAT SERPL-MCNC: 0.6 MG/DL (ref 0.5–1.4)
CRP SERPL-MCNC: 5.2 MG/L
ERYTHROCYTE [DISTWIDTH] IN BLOOD BY AUTOMATED COUNT: 16.1 % (ref 11.5–14.5)
GFR SERPLBLD CREATININE-BSD FMLA CKD-EPI: >60 ML/MIN/1.73/M2
GLUCOSE SERPL-MCNC: 81 MG/DL (ref 70–110)
HCT VFR BLD AUTO: 35.1 % (ref 37–48.5)
HGB BLD-MCNC: 11.5 GM/DL (ref 12–16)
IMM GRANULOCYTES # BLD AUTO: 0.02 K/UL (ref 0–0.04)
IMM GRANULOCYTES NFR BLD AUTO: 0.4 % (ref 0–0.5)
LDH SERPL-CCNC: 211 U/L (ref 110–260)
LYMPHOCYTES # BLD AUTO: 1.08 K/UL (ref 1–4.8)
MCH RBC QN AUTO: 26.3 PG (ref 27–31)
MCHC RBC AUTO-ENTMCNC: 32.8 G/DL (ref 32–36)
MCV RBC AUTO: 80 FL (ref 82–98)
NUCLEATED RBC (/100WBC) (OHS): 0 /100 WBC
PLATELET # BLD AUTO: 254 K/UL (ref 150–450)
PMV BLD AUTO: 8 FL (ref 9.2–12.9)
POTASSIUM SERPL-SCNC: 3.4 MMOL/L (ref 3.5–5.1)
PROT SERPL-MCNC: 7.5 GM/DL (ref 6–8.4)
RBC # BLD AUTO: 4.38 M/UL (ref 4–5.4)
RELATIVE EOSINOPHIL (OHS): 0.4 %
RELATIVE LYMPHOCYTE (OHS): 23 % (ref 18–48)
RELATIVE MONOCYTE (OHS): 13.6 % (ref 4–15)
RELATIVE NEUTROPHIL (OHS): 62.4 % (ref 38–73)
SODIUM SERPL-SCNC: 140 MMOL/L (ref 136–145)
WBC # BLD AUTO: 4.7 K/UL (ref 3.9–12.7)

## 2025-05-16 PROCEDURE — 36415 COLL VENOUS BLD VENIPUNCTURE: CPT

## 2025-05-16 PROCEDURE — 82040 ASSAY OF SERUM ALBUMIN: CPT

## 2025-05-16 PROCEDURE — 86140 C-REACTIVE PROTEIN: CPT

## 2025-05-16 PROCEDURE — 85025 COMPLETE CBC W/AUTO DIFF WBC: CPT

## 2025-05-16 PROCEDURE — 99999 PR PBB SHADOW E&M-EST. PATIENT-LVL IV: CPT | Mod: PBBFAC,,, | Performed by: INTERNAL MEDICINE

## 2025-05-16 PROCEDURE — 83615 LACTATE (LD) (LDH) ENZYME: CPT

## 2025-05-16 RX ORDER — HEPARIN 100 UNIT/ML
500 SYRINGE INTRAVENOUS
OUTPATIENT
Start: 2025-05-19

## 2025-05-16 RX ORDER — SODIUM CHLORIDE 0.9 % (FLUSH) 0.9 %
10 SYRINGE (ML) INJECTION
OUTPATIENT
Start: 2025-05-19

## 2025-05-16 RX ORDER — EPINEPHRINE 0.3 MG/.3ML
0.3 INJECTION SUBCUTANEOUS ONCE AS NEEDED
OUTPATIENT
Start: 2025-05-19

## 2025-05-16 RX ORDER — DIPHENHYDRAMINE HYDROCHLORIDE 50 MG/ML
50 INJECTION, SOLUTION INTRAMUSCULAR; INTRAVENOUS ONCE AS NEEDED
OUTPATIENT
Start: 2025-05-19

## 2025-05-16 NOTE — PROGRESS NOTES
Subjective:       Patient ID: Cassandra Campos is a 74 y.o. female.    Chief Complaint: Results, Chemotherapy, and Lung Cancer    HPI:  74-year-old female history of stage III non-small cell lung carcinoma completed combined chemoradiation patient had CT chest abdomen pelvis demonstrates reduction in size no evidence of new disease.  Initiation of plan systemic therapy to start 05/19/2025 with adjuvant immunotherapy x1 year ECOG status 1    Past Medical History:   Diagnosis Date    Arthritis     hands    Bilateral bunions     Borderline glaucoma     De Quervain's disease (radial styloid tenosynovitis)     Gastritis     upper GI 2/2017    Hydradenitis     Hyperlipidemia     Hypertension     Insomnia     Lung cancer     Migraines 02/01/2000    Nasal septum perforation     Obesity     Pneumonia     Restrictive airway disease     Sleep apnea     SVT (supraventricular tachycardia) 09/2013    Trigger finger     Type 2 diabetes mellitus 2012     am 02/01/2024     Family History   Problem Relation Name Age of Onset    Prostate cancer Brother      Diabetes Maternal Aunt Alondra Campos     Diabetes Cousin      Hypertension Maternal Grandmother Portia Orosco      Social History[1]  Past Surgical History:   Procedure Laterality Date    AXILLARY HIDRADENITIS EXCISION Bilateral     BONE EXOSTOSIS EXCISION Right 07/25/2018    Procedure: EXCISION, EXOSTOSIS;  Surgeon: Jayro Pedraza Sr., MD;  Location: [facility] OR;  Service: Orthopedics;  Laterality: Right;    BREAST BIOPSY Bilateral     both benign    BREAST LUMPECTOMY  07/1998    BREAST SURGERY  07/1998    BRONCHOSCOPY Bilateral 01/15/2025    Procedure: Bronchoscopy - insert lighted tube into airway to take a biopsy of lung;  Surgeon: Adrian Robin MD;  Location: [facility] ENDO;  Service: Pulmonary;  Laterality: Bilateral;    CARPAL TUNNEL RELEASE      bilateral    CARPAL TUNNEL RELEASE Right 02/09/2024    Procedure: RELEASE, CARPAL TUNNEL;  Surgeon: Jaime Oswald MD;   Location[facility] OR;  Service: Orthopedics;  Laterality: Right;    CARPAL TUNNEL RELEASE Left 2024    Procedure: RELEASE, CARPAL TUNNEL;  Surgeon: Jaime Oswald MD;  Location: [facility];  Service: Orthopedics;  Laterality: Left;    CATARACT EXTRACTION Bilateral     OU     SECTION  1979    CHOLECYSTECTOMY  2014    COLONOSCOPY N/A 10/02/2020    Procedure: COLONOSCOPY;  Surgeon: Tushar Edwards MD;  Location: [facility];  Service: Endoscopy;  Laterality: N/A;    COLONOSCOPY W/ POLYPECTOMY  10/02/2020    Polyps x3, repeat 5 years; Tushar Edwards MD     CYST REMOVAL  2015    sebaceous cyst removed from face    DE QUERVAIN'S RELEASE Left 2020    Procedure: RELEASE, HAND, FOR DEQUERVAIN'S TENOSYNOVITIS;  Surgeon: Jaime Oswald MD;  Location: [facility] OR;  Service: Orthopedics;  Laterality: Left;    DE QUERVAIN'S RELEASE Right 2020    Procedure: RELEASE, HAND, FOR DEQUERVAIN'S TENOSYNOVITIS;  Surgeon: Jaime Oswald MD;  Location: [facility];  Service: Orthopedics;  Laterality: Right;    ENDOBRONCHIAL ULTRASOUND Bilateral 04/10/2024    Procedure: ENDOBRONCHIAL ULTRASOUND (EBUS);  Surgeon: Adrian Robin MD;  Location: [facility];  Service: Pulmonary;  Laterality: Bilateral;    ENDOBRONCHIAL ULTRASOUND Bilateral 01/15/2025    Procedure: ENDOBRONCHIAL ULTRASOUND (EBUS);  Surgeon: Adrian Robin MD;  Location: [facility];  Service: Pulmonary;  Laterality: Bilateral;    EYE SURGERY      gastric sleeve  2017    Dr. Watson    INJECTION OF ANESTHETIC AGENT AROUND MULTIPLE INTERCOSTAL NERVES  05/10/2024    Procedure: BLOCK, NERVE, INTERCOSTAL, 2 OR MORE;  Surgeon: Mike Nuñez MD;  Location: [facility];  Service: Cardiothoracic;;    KNEE SURGERY Right     OLECRANON BURSECTOMY Right 2018    Procedure: BURSECTOMY, OLECRANON;  Surgeon: Jayro Pedraza Sr., MD;  Location: [facility];  Service: Orthopedics;  Laterality: Right;    SURGICAL REMOVAL OF BUNION WITH OSTEOTOMY  OF METATARSAL BONE Left 05/10/2019    Procedure: BUNIONECTOMY, WITH METATARSAL OSTEOTOMY;  Surgeon: Srinivasna Villanueva DPM;  Location: [facility] OR;  Service: Podiatry;  Laterality: Left;    SURGICAL REMOVAL OF BUNION WITH OSTEOTOMY OF METATARSAL BONE Right 06/28/2019    Procedure: BUNIONECTOMY, WITH METATARSAL OSTEOTOMY;  Surgeon: Srinivasan Villanueva DPM;  Location: [facility] OR;  Service: Podiatry;  Laterality: Right;    SURGICAL REMOVAL OF LYMPH NODE Left 05/10/2024    Procedure: EXCISION, LYMPH NODE;  Surgeon: Mike Nuñez MD;  Location: [facility] OR;  Service: Cardiothoracic;  Laterality: Left;    TONSILLECTOMY, ADENOIDECTOMY  1980s    TRANSESOPHAGEAL ECHOCARDIOGRAM WITH POSSIBLE CARDIOVERSION (LAKESHIA W/ POSS CARDIOVERSION) N/A 05/15/2024    Procedure: Transesophageal echo (LAKESHIA) intra-procedure log documentation;  Surgeon: Twan Pelaez MD;  Location: [facility] CATH LAB;  Service: Cardiology;  Laterality: N/A;    TRIGGER FINGER RELEASE Right 04/01/2015    Dr. Milo HALL ROBOTIC RATS,WITH LOBECTOMY,LUNG Left 05/10/2024    Procedure: XI ROBOTIC RATS,WITH LOBECTOMY,LUNG;  Surgeon: Mike Nuñez MD;  Location: [facility] OR;  Service: Cardiothoracic;  Laterality: Left;  Lingulectomy       Labs:  Lab Results   Component Value Date    WBC 4.70 05/16/2025    HGB 11.5 (L) 05/16/2025    HCT 35.1 (L) 05/16/2025    MCV 80 (L) 05/16/2025     05/16/2025     BMP  Lab Results   Component Value Date     05/16/2025    K 3.4 (L) 05/16/2025     05/16/2025    CO2 26 05/16/2025    BUN 12 05/16/2025    CREATININE 0.6 05/16/2025    CALCIUM 9.0 05/16/2025    ANIONGAP 7 (L) 05/16/2025    ESTGFRAFRICA >60 06/21/2022    EGFRNONAA >60 06/21/2022     Lab Results   Component Value Date    ALT 77 (H) 05/16/2025    AST 90 (H) 05/16/2025    ALKPHOS 82 05/16/2025    BILITOT 0.2 05/16/2025       Lab Results   Component Value Date    IRON 112 09/12/2022    TIBC 448 11/28/2016    FERRITIN 35 09/12/2022     Lab Results   Component Value Date     ZDLKQNLM59 1440 (H) 08/14/2018     Lab Results   Component Value Date    FOLATE 15.5 11/28/2016     Lab Results   Component Value Date    TSH 1.821 04/25/2025         Review of Systems   Constitutional:  Negative for activity change, appetite change, chills, diaphoresis, fatigue, fever and unexpected weight change.   HENT:  Negative for congestion, dental problem, drooling, ear discharge, ear pain, facial swelling, hearing loss, mouth sores, nosebleeds, postnasal drip, rhinorrhea, sinus pressure, sneezing, sore throat, tinnitus, trouble swallowing and voice change.    Eyes:  Negative for photophobia, pain, discharge, redness, itching and visual disturbance.   Respiratory:  Negative for cough, choking, chest tightness, shortness of breath, wheezing and stridor.    Cardiovascular:  Negative for chest pain, palpitations and leg swelling.   Gastrointestinal:  Negative for abdominal distention, abdominal pain, anal bleeding, blood in stool, constipation, diarrhea, nausea, rectal pain and vomiting.   Endocrine: Negative for cold intolerance, heat intolerance, polydipsia, polyphagia and polyuria.   Genitourinary:  Negative for decreased urine volume, difficulty urinating, dyspareunia, dysuria, enuresis, flank pain, frequency, genital sores, hematuria, menstrual problem, pelvic pain, urgency, vaginal bleeding, vaginal discharge and vaginal pain.   Musculoskeletal:  Negative for arthralgias, back pain, gait problem, joint swelling, myalgias, neck pain and neck stiffness.   Skin:  Negative for color change, pallor and rash.   Allergic/Immunologic: Negative for environmental allergies, food allergies and immunocompromised state.   Neurological:  Negative for dizziness, tremors, seizures, syncope, facial asymmetry, speech difficulty, weakness, light-headedness, numbness and headaches.   Hematological:  Negative for adenopathy. Does not bruise/bleed easily.   Psychiatric/Behavioral:  Negative for agitation, behavioral problems,  confusion, decreased concentration, dysphoric mood, hallucinations, self-injury, sleep disturbance and suicidal ideas. The patient is not nervous/anxious and is not hyperactive.        Objective:      Physical Exam  Vitals reviewed.   Constitutional:       General: She is not in acute distress.     Appearance: She is well-developed. She is not diaphoretic.   HENT:      Head: Normocephalic and atraumatic.      Right Ear: External ear normal.      Left Ear: External ear normal.      Nose: Nose normal.      Right Sinus: No maxillary sinus tenderness or frontal sinus tenderness.      Left Sinus: No maxillary sinus tenderness or frontal sinus tenderness.      Mouth/Throat:      Pharynx: No oropharyngeal exudate.   Eyes:      General: Lids are normal. No scleral icterus.        Right eye: No discharge.         Left eye: No discharge.      Conjunctiva/sclera: Conjunctivae normal.      Right eye: Right conjunctiva is not injected. No hemorrhage.     Left eye: Left conjunctiva is not injected. No hemorrhage.     Pupils: Pupils are equal, round, and reactive to light.   Neck:      Thyroid: No thyromegaly.      Vascular: No JVD.      Trachea: No tracheal deviation.   Cardiovascular:      Rate and Rhythm: Normal rate.   Pulmonary:      Effort: Pulmonary effort is normal. No respiratory distress.      Breath sounds: No stridor.   Chest:      Chest wall: No tenderness.   Abdominal:      General: Bowel sounds are normal. There is no distension.      Palpations: Abdomen is soft. There is no hepatomegaly, splenomegaly or mass.      Tenderness: There is no abdominal tenderness. There is no rebound.   Musculoskeletal:         General: No tenderness. Normal range of motion.      Cervical back: Normal range of motion and neck supple.   Lymphadenopathy:      Cervical: No cervical adenopathy.      Upper Body:      Right upper body: No supraclavicular adenopathy.      Left upper body: No supraclavicular adenopathy.   Skin:     General:  Skin is dry.      Findings: No erythema or rash.   Neurological:      Mental Status: She is alert and oriented to person, place, and time.      Cranial Nerves: No cranial nerve deficit.      Coordination: Coordination normal.   Psychiatric:         Behavior: Behavior normal.         Thought Content: Thought content normal.         Judgment: Judgment normal.             Assessment:      1. Malignant neoplasm of lower lobe of left lung    2. Immunodeficiency due to chemotherapy    3. Immunodeficiency secondary to radiation therapy           Med Onc Chart Routing      Follow up with physician . Return in 1 month can be alternate APAP and MD CBC CMP TSH prior   Follow up with [facility]    Infusion scheduling note    Injection scheduling note Durvalumab monthly   Labs    Imaging    Pharmacy appointment    Other referrals                 Plan:     Reviewed information will start patient on 2025 Durvalumab monthly x1 year orders written reviewed.  Consent for treatment signedConsented the patient to the treatment plan and the patient was educated on the planned duration of the treatment and schedule of the treatment administration.  Referral made for advanced care planning if not already doneAdvance Care Planning reviewed risk benefit ratio orders signed and can be alternate MD BELL    /      Emerson Moran Jr, MD FACP         [1]   Social History  Socioeconomic History    Marital status:     Number of children: 1   Occupational History    Occupation:  aid   Tobacco Use    Smoking status: Former     Current packs/day: 0.00     Average packs/day: 0.5 packs/day for 42.0 years (21.0 ttl pk-yrs)     Types: Cigarettes     Start date: 1970     Quit date: 2012     Years since quittin.3     Passive exposure: Past    Smokeless tobacco: Never   Substance and Sexual Activity    Alcohol use: Not Currently     Alcohol/week: 1.0 standard drink of alcohol     Types: 1 Glasses of wine per week      Comment: Glass red wine once a every 2 weeks    Drug use: Never    Sexual activity: Not Currently     Partners: Female     Birth control/protection: Abstinence, None   Social History Narrative    Single, part-time teacher. Masters degree biology.      Social Drivers of Health     Financial Resource Strain: Patient Declined (4/3/2025)    Overall Financial Resource Strain (CARDIA)     Difficulty of Paying Living Expenses: Patient declined   Food Insecurity: Patient Declined (4/3/2025)    Hunger Vital Sign     Worried About Running Out of Food in the Last Year: Patient declined     Ran Out of Food in the Last Year: Patient declined   Transportation Needs: No Transportation Needs (4/2/2025)    PRAPARE - Transportation     Lack of Transportation (Medical): No     Lack of Transportation (Non-Medical): No   Physical Activity: Insufficiently Active (12/19/2024)    Exercise Vital Sign     Days of Exercise per Week: 3 days     Minutes of Exercise per Session: 20 min   Stress: Stress Concern Present (4/3/2025)    [employer] - Occupational Stress Questionnaire     Feeling of Stress : Very much   Housing Stability: High Risk (4/3/2025)    Housing Stability Vital Sign     Unable to Pay for Housing in the Last Year: Yes     Homeless in the Last Year: No

## 2025-05-16 NOTE — TELEPHONE ENCOUNTER
No care due was identified.  [facility] Embedded Care Due Messages. Reference number: [id number].   5/16/2025 9:44:29 AM CDT

## 2025-05-18 RX ORDER — LORAZEPAM 1 MG/1
1 TABLET ORAL 2 TIMES DAILY
Qty: 60 TABLET | Refills: 1 | Status: SHIPPED | OUTPATIENT
Start: 2025-05-18

## 2025-05-18 RX ORDER — ESZOPICLONE 3 MG/1
3 TABLET, FILM COATED ORAL NIGHTLY
Qty: 30 TABLET | Refills: 1 | Status: SHIPPED | OUTPATIENT
Start: 2025-05-18

## 2025-05-19 ENCOUNTER — DOCUMENTATION ONLY (OUTPATIENT)
Dept: HEMATOLOGY/ONCOLOGY | Facility: CLINIC | Age: 75
End: 2025-05-19
Payer: MEDICARE

## 2025-05-19 ENCOUNTER — INFUSION (OUTPATIENT)
Dept: INFUSION THERAPY | Facility: HOSPITAL | Age: 75
End: 2025-05-19
Attending: INTERNAL MEDICINE
Payer: MEDICARE

## 2025-05-19 VITALS
OXYGEN SATURATION: 95 % | WEIGHT: 140.19 LBS | RESPIRATION RATE: 16 BRPM | HEART RATE: 96 BPM | TEMPERATURE: 98 F | SYSTOLIC BLOOD PRESSURE: 105 MMHG | BODY MASS INDEX: 25.8 KG/M2 | HEIGHT: 62 IN | DIASTOLIC BLOOD PRESSURE: 65 MMHG

## 2025-05-19 DIAGNOSIS — C34.32 MALIGNANT NEOPLASM OF LOWER LOBE OF LEFT LUNG: Primary | ICD-10-CM

## 2025-05-19 PROCEDURE — 25000003 PHARM REV CODE 250: Performed by: INTERNAL MEDICINE

## 2025-05-19 PROCEDURE — 63600175 PHARM REV CODE 636 W HCPCS: Mod: JZ,TB | Performed by: INTERNAL MEDICINE

## 2025-05-19 PROCEDURE — 96413 CHEMO IV INFUSION 1 HR: CPT

## 2025-05-19 RX ORDER — DIPHENHYDRAMINE HYDROCHLORIDE 50 MG/ML
50 INJECTION, SOLUTION INTRAMUSCULAR; INTRAVENOUS ONCE AS NEEDED
Status: DISCONTINUED | OUTPATIENT
Start: 2025-05-19 | End: 2025-05-19 | Stop reason: HOSPADM

## 2025-05-19 RX ORDER — SODIUM CHLORIDE 0.9 % (FLUSH) 0.9 %
10 SYRINGE (ML) INJECTION
Status: DISCONTINUED | OUTPATIENT
Start: 2025-05-19 | End: 2025-05-19 | Stop reason: HOSPADM

## 2025-05-19 RX ORDER — EPINEPHRINE 0.3 MG/.3ML
0.3 INJECTION SUBCUTANEOUS ONCE AS NEEDED
Status: DISCONTINUED | OUTPATIENT
Start: 2025-05-19 | End: 2025-05-19 | Stop reason: HOSPADM

## 2025-05-19 RX ADMIN — SODIUM CHLORIDE 1500 MG: 9 INJECTION, SOLUTION INTRAVENOUS at 02:05

## 2025-05-19 NOTE — PROGRESS NOTES
KARRIE was consulted to draft letter for patient stating she completed treatment. KARRIE drafted letter and provided to MD for signature. KARRIE will remain available.

## 2025-05-19 NOTE — PLAN OF CARE
Problem: Adult Inpatient Plan of Care  Goal: Plan of Care Review  Outcome: Progressing  Flowsheets (Taken 5/19/2025 1417)  Plan of Care Reviewed With: patient  Goal: Patient-Specific Goal (Individualized)  Outcome: Progressing  Flowsheets (Taken 5/19/2025 1417)  Individualized Care Needs: pt likes feet up, blanket and snacks provided  Anxieties, Fears or Concerns: pt denies  Patient/Family-Specific Goals (Include Timeframe): pt to tolerate infusion without reaction     Problem: Chemotherapy Effects  Goal: Anemia Symptom Improvement  Outcome: Progressing  Goal: Safety Maintained  Outcome: Progressing  Goal: Absence of Hematuria  Outcome: Progressing  Goal: Nausea and Vomiting Relief  Outcome: Progressing  Goal: Neurotoxicity Symptom Control  Outcome: Progressing  Goal: Absence of Infection  Outcome: Progressing  Goal: Absence of Bleeding  Outcome: Progressing

## 2025-05-19 NOTE — DISCHARGE INSTRUCTIONS
[facility]  [address] [address]  [address]  [phone] phone     [phone] fax  Hours of Operation: Monday- Friday 8:00am- 5:00pm  After hours phone  [phone]  Hematology / Oncology Physicians on call      VAUGHN Alonso Dr., NP Phaon Dunbar, NP Khelsea Conley, FNP    Please call with any concerns regarding your appointment today.

## 2025-05-22 ENCOUNTER — OFFICE VISIT (OUTPATIENT)
Dept: SPORTS MEDICINE | Facility: CLINIC | Age: 75
End: 2025-05-22
Payer: MEDICARE

## 2025-05-22 VITALS — BODY MASS INDEX: 25.76 KG/M2 | HEIGHT: 62 IN | WEIGHT: 140 LBS

## 2025-05-22 DIAGNOSIS — M17.0 PRIMARY OSTEOARTHRITIS OF BOTH KNEES: Primary | ICD-10-CM

## 2025-05-22 DIAGNOSIS — M25.561 ACUTE PAIN OF RIGHT KNEE: ICD-10-CM

## 2025-05-22 PROCEDURE — 99999 PR PBB SHADOW E&M-EST. PATIENT-LVL IV: CPT | Mod: PBBFAC,,, | Performed by: STUDENT IN AN ORGANIZED HEALTH CARE EDUCATION/TRAINING PROGRAM

## 2025-05-22 RX ORDER — TRIAMCINOLONE ACETONIDE 40 MG/ML
40 INJECTION, SUSPENSION INTRA-ARTICULAR; INTRAMUSCULAR
Status: DISCONTINUED | OUTPATIENT
Start: 2025-05-22 | End: 2025-05-22 | Stop reason: HOSPADM

## 2025-05-22 RX ADMIN — TRIAMCINOLONE ACETONIDE 40 MG: 40 INJECTION, SUSPENSION INTRA-ARTICULAR; INTRAMUSCULAR at 01:05

## 2025-05-22 NOTE — PROGRESS NOTES
Patient ID: Cassandra Campos  YOB: 1950  MRN: [id number]    Chief Complaint: Pain of the Right Knee      History of Present Illness: Cassandra Campos is a right-hand dominant 74 y.o. female who presents today with right knee pain.  Last seen in clinic on 2/12/2025 and received bilateral cortisone injections to knees.  Reports that right knee started bothering her approximately 1 week ago.  Left knee is not giving her issues today.  Describes the pain as an intermittent throbbing pain, mainly present first thing in morning and at night.  Occasionally will awake her from sleep. Rates pain at an 8/10.  Interested in repeating CSI to the right knee today.    2/12/2025 Interval History of Present Illness: Cassandra Campos is a 74 y.o. female who presents today with bilateral knee pain, left greater than right.  Patient last seen in clinic on 11/07/2024 and received steroid injections.   Currently rates her pain at a 7/10 and would like to repeat injections today.      11/07/2024 Interval History of Present Illness: Cassandra Camops is a 74 y.o. female who presents today with bilateral knee pain, left greater than right.  Patient last seen in clinic on 8/21/2024 and received Synvisc One injections.  Received a CSI to the right knee on 6/12/2024 and left knee on 6/26/2024.  Received a Toradol injection on 7/18/2024.  Currently rates her pain at a 7/10 and would like to repeat injections today.      7/18/2024 Interval History of Present Illness: Cassandra Campos is a right-hand dominant 73 y.o. female who presents today with bilateral knee pain, right greater than left.  Reports that she bumped her right knee recently and has increased her pain. Last seen in clinic on 6/26/2024 and received CSI to the    Left knee.  Received cSI to the right knee on 6/12/2024.  Currently rates pain at a 10/10 with constant throbbing pain that keeps her up at night.  Patient reports pain when walking and  sitting.  Completed PT last week and currently taking Mobic and Tylenol.     6/26/2024 Interval History of Present Illness: Cassandra Campos is a right-hand dominant 73 y.o. female who presents today with left knee and left shoulder pain.   Patient notes pain is at worst a 10/10 at times affecting her activity of daily living and thus her quality of life.  She has not using any devices to assist with ambulation nor is she wearing any knee braces.  She states she takes Tylenol as needed to help with the pain .  She states she has having a hard time walking long distances, going from a sitting to standing or standing to seated position, unlevel terrain or stairs.  She also complains of left shoulder pain and dysfunction that had started 3-4 weeks . Her symptoms included pain and lack of range of motion. Her pain was aggravated by overhead movement, reaching movements. She had tried rest, activity modification, Muscle relaxer, ibuprofen, heat and ice.     6/12/2024 Interval History of Present Illness: Cassandra Campos is a right-hand dominant 73 y.o. female who presents today with bilateral shoulder pain and bilateral knee pain.  73-year-old female with history of HTN, HLD, and DM  with c/o left shoulder pain.  States pain to left shoulder with touch and movement. States difficulty lifting shoulder above head and mostly keeping her up at night.. States taking OTC NSAIDs with minimal relief. She was seen by  POLO Alan and received left knee CSI on 3/22/24. Received SA steroid injection by Dr. Palma on 2/07/2024.  Pt states she was concerned she is still having pain. MRI left shoulder on file. Patient is currently doing Home health physical therapy.             Past Medical History:   Past Medical History:   Diagnosis Date    Arthritis     hands    Bilateral bunions     Borderline glaucoma     De Quervain's disease (radial styloid tenosynovitis)     Gastritis     upper GI 2/2017    Hydradenitis     Hyperlipidemia      Hypertension     Insomnia     Lung cancer     Migraines 2000    Nasal septum perforation     Obesity     Pneumonia     Restrictive airway disease     Sleep apnea     SVT (supraventricular tachycardia) 2013    Trigger finger     Type 2 diabetes mellitus 2012     am 2024     Past Surgical History:   Procedure Laterality Date    AXILLARY HIDRADENITIS EXCISION Bilateral     BONE EXOSTOSIS EXCISION Right 2018    Procedure: EXCISION, EXOSTOSIS;  Surgeon: Jayro Pedraza Sr., MD;  Location: [facility];  Service: Orthopedics;  Laterality: Right;    BREAST BIOPSY Bilateral     both benign    BREAST LUMPECTOMY  1998    BREAST SURGERY  1998    BRONCHOSCOPY Bilateral 01/15/2025    Procedure: Bronchoscopy - insert lighted tube into airway to take a biopsy of lung;  Surgeon: Adrian Robin MD;  Location: [facility];  Service: Pulmonary;  Laterality: Bilateral;    CARPAL TUNNEL RELEASE      bilateral    CARPAL TUNNEL RELEASE Right 2024    Procedure: RELEASE, CARPAL TUNNEL;  Surgeon: Jaime Oswald MD;  Location: [facility];  Service: Orthopedics;  Laterality: Right;    CARPAL TUNNEL RELEASE Left 2024    Procedure: RELEASE, CARPAL TUNNEL;  Surgeon: Jaime Oswald MD;  Location: [facility] OR;  Service: Orthopedics;  Laterality: Left;    CATARACT EXTRACTION Bilateral     OU     SECTION  1979    CHOLECYSTECTOMY  2014    COLONOSCOPY N/A 10/02/2020    Procedure: COLONOSCOPY;  Surgeon: Tushar Edwards MD;  Location: [facility];  Service: Endoscopy;  Laterality: N/A;    COLONOSCOPY W/ POLYPECTOMY  10/02/2020    Polyps x3, repeat 5 years; Tushar Edwards MD     CYST REMOVAL  2015    sebaceous cyst removed from face    DE QUERVAIN'S RELEASE Left 2020    Procedure: RELEASE, HAND, FOR DEQUERVAIN'S TENOSYNOVITIS;  Surgeon: Jaime Oswald MD;  Location: [facility] OR;  Service: Orthopedics;  Laterality: Left;    DE QUERVAIN'S RELEASE Right 2020    Procedure:  RELEASE, HAND, FOR DEQUERVAIN'S TENOSYNOVITIS;  Surgeon: Jaime Oswald MD;  Location: [facility] OR;  Service: Orthopedics;  Laterality: Right;    ENDOBRONCHIAL ULTRASOUND Bilateral 04/10/2024    Procedure: ENDOBRONCHIAL ULTRASOUND (EBUS);  Surgeon: Adrian Robin MD;  Location: [facility] ENDO;  Service: Pulmonary;  Laterality: Bilateral;    ENDOBRONCHIAL ULTRASOUND Bilateral 01/15/2025    Procedure: ENDOBRONCHIAL ULTRASOUND (EBUS);  Surgeon: Adrian Robin MD;  Location: [facility] ENDO;  Service: Pulmonary;  Laterality: Bilateral;    EYE SURGERY      gastric sleeve  02/13/2017    Dr. Watson    INJECTION OF ANESTHETIC AGENT AROUND MULTIPLE INTERCOSTAL NERVES  05/10/2024    Procedure: BLOCK, NERVE, INTERCOSTAL, 2 OR MORE;  Surgeon: Mike Nuñez MD;  Location: [facility] OR;  Service: Cardiothoracic;;    KNEE SURGERY Right     OLECRANON BURSECTOMY Right 07/25/2018    Procedure: BURSECTOMY, OLECRANON;  Surgeon: Jaryo Pedraza Sr., MD;  Location: [facility];  Service: Orthopedics;  Laterality: Right;    SURGICAL REMOVAL OF BUNION WITH OSTEOTOMY OF METATARSAL BONE Left 05/10/2019    Procedure: BUNIONECTOMY, WITH METATARSAL OSTEOTOMY;  Surgeon: Srinivasan Villanueva DPM;  Location: [facility] OR;  Service: Podiatry;  Laterality: Left;    SURGICAL REMOVAL OF BUNION WITH OSTEOTOMY OF METATARSAL BONE Right 06/28/2019    Procedure: BUNIONECTOMY, WITH METATARSAL OSTEOTOMY;  Surgeon: Srinivasan Villanueva DPM;  Location: [facility] OR;  Service: Podiatry;  Laterality: Right;    SURGICAL REMOVAL OF LYMPH NODE Left 05/10/2024    Procedure: EXCISION, LYMPH NODE;  Surgeon: Mike Nuñez MD;  Location: [facility] OR;  Service: Cardiothoracic;  Laterality: Left;    TONSILLECTOMY, ADENOIDECTOMY  1980s    TRANSESOPHAGEAL ECHOCARDIOGRAM WITH POSSIBLE CARDIOVERSION (LAKESHIA W/ POSS CARDIOVERSION) N/A 05/15/2024    Procedure: Transesophageal echo (LAKESHIA) intra-procedure log documentation;  Surgeon: Twan Pelaez MD;  Location: [facility] CATH LAB;  Service: Cardiology;   Laterality: N/A;    TRIGGER FINGER RELEASE Right 04/01/2015    Dr. Milo HALL ROBOTIC RATS,WITH LOBECTOMY,LUNG Left 05/10/2024    Procedure: XI ROBOTIC RATS,WITH LOBECTOMY,LUNG;  Surgeon: Mike Nuñez MD;  Location: [facility];  Service: Cardiothoracic;  Laterality: Left;  Lingulectomy     Family History   Problem Relation Name Age of Onset    Prostate cancer Brother      Diabetes Maternal Aunt Alondra Campos     Diabetes Cousin      Hypertension Maternal Grandmother Portia Orosco      Social History[1]  Medication List with Changes/Refills   Current Medications    ALBUTEROL (PROVENTIL/VENTOLIN HFA) 90 MCG/ACTUATION INHALER    INHALE 2 PUFFS INTO THE LUNGS EVERY 4 HOURS AS NEEDED FOR WHEEZING OR SHORTNESS OF BREATH.    ALBUTEROL-IPRATROPIUM (DUO-NEB) 2.5 MG-0.5 MG/3 ML NEBULIZER SOLUTION    Take 3 mLs by nebulization 2 (two) times a day. Rescue    AMITRIPTYLINE (ELAVIL) 25 MG TABLET    Take 1 tablet (25 mg total) by mouth every evening.    ASPIRIN (ECOTRIN) 81 MG EC TABLET    Take 1 tablet (81 mg total) by mouth once daily.    BLOOD SUGAR DIAGNOSTIC STRP    use to test blood sugar 1-2 times a day    BLOOD-GLUCOSE METER (ACCU-CHEK GUIDE ME GLUCOSE MTR) MISC    use to check blood glucose 2 times a day    BLOOD-GLUCOSE METER (ACCU-CHEK GUIDE ME GLUCOSE MTR) MISC    To check blood glucose 1-2 times daily, to use with insurance preferred meter    ESZOPICLONE (LUNESTA) 3 MG TAB    Take 1 tablet (3 mg total) by mouth every evening.    LANCETS MISC    Use to test blood glucose 1-2 times a day, discard lancet after each use    LEVOFLOXACIN (LEVAQUIN) 500 MG TABLET    Take 1 tablet (500 mg total) by mouth once daily. for 10 days    LORAZEPAM (ATIVAN) 1 MG TABLET    Take 1 tablet (1 mg total) by mouth 2 (two) times daily.    LOSARTAN (COZAAR) 25 MG TABLET    Take 1 tablet (25 mg total) by mouth once daily.    METHYLPREDNISOLONE (MEDROL DOSEPACK) 4 MG TABLET    use as directed    METOPROLOL SUCCINATE (TOPROL-XL) 50 MG 24 HR  TABLET    Take 1½ tablets (75 mg total) by mouth once daily.    OLANZAPINE (ZYPREXA) 5 MG TABLET    Take 1 tablet (5 mg total) by mouth every evening. On days 1-3 of each chemotherapy cycle.    OMEPRAZOLE (PRILOSEC) 20 MG CAPSULE    Take 1 capsule (20 mg total) by mouth once daily.    OXYCODONE-ACETAMINOPHEN (PERCOCET)  MG PER TABLET    Take 1 tablet by mouth every 4 (four) hours as needed for Pain.    PROCHLORPERAZINE (COMPAZINE) 5 MG TABLET    Take 1 tablet (5 mg total) by mouth every 6 (six) hours as needed for Nausea.    SEMAGLUTIDE (OZEMPIC) 2 MG/DOSE (8 MG/3 ML) PNIJ    Inject 2 mg into the skin every 7 days.    TAMSULOSIN (FLOMAX) 0.4 MG CAP    Take 1 capsule (0.4 mg total) by mouth once daily.    TRAMADOL (ULTRAM) 50 MG TABLET    Take 1 tablet (50 mg total) by mouth 2 (two) times a day.     Review of patient's allergies indicates:  No Known Allergies    Physical Exam:   Body mass index is 25.61 kg/m².    GENERAL: Well appearing, in no acute distress.  HEAD: Normocephalic and atraumatic.  ENT: External ears and nose grossly normal.  EYES: EOMI bilaterally  PULMONARY: Respirations are grossly even and non-labored.  NEURO: Awake, alert, and oriented x 3.  SKIN: No obvious rashes appreciated.  PSYCH: Mood & affect are appropriate.    Detailed MSK exam:     Right knee exam:   -ROM: extension 0, flexion 120  -TTP: Medial joint line and Lateral joint line  -effusion: none  -Patellar apprehension negative  -Thor test negative  -stable to varus and valgus stress tests  -Lachman test negative, anterior drawer test negative, posterior drawer test negative    Imaging:  CT Chest Abdomen Pelvis With IV Contrast (XPD) Routine Oral Contrast  Narrative: EXAM: CT CHEST ABDOMEN PELVIS WITH IV CONTRAST (XPD)    CLINICAL HISTORY: Metastatic disease evaluation; [C34.32]-Malignant neoplasm of lower lobe, left bronchus or lung./[D84.821]-Immunodeficiency due to drugs./[T45.1X5A]-Adverse effect of antineoplastic and  immunosuppressive drugs, initial encounter./[Z79.6 TRUNCATED ...    COMPARISON: 04/01/2025    TECHNIQUE: Contrast-enhanced CT protocol of the chest abdomen and pelvis was performed after the intravenous administration of  100 mL of Isovue 370. All CT scans at [this location] are performed using dose modulation techniques as appropriate to a performed exam including the following: automated exposure control; adjustment of the mA and/or kV according to patient size (this includes techniques or standardized protocols for targeted exams where dose is matched to indication / reason for exam; i.e. extremities or head); use of iterative reconstruction technique    FINDINGS:  Chest:  Lower neck: Unremarkable.  Mediastinum: Unremarkable.  Esophagus: Unremarkable.  Heart: Unremarkable. . Moderate  coronary artery calcification.  Pulmonary arteries:  Unremarkable.  Body wall/axilla: Unremarkable.  Lungs/pleura: Compared to 04/01/2025, interval 30-40% reduction in patchy predominantly left-sided consolidation and groundglass with unchanged left posterior lower lobe 13 x 13 mm nodule abutting the pleura series 2 image 76.  No new areas of opacification or nodules.  Thickening of the airways with secretions indicating airway inflammation.  Stable postoperative changes left lingula resection.  Airways: Unremarkable.    Abdomen Pelvis:  Liver: Unremarkable.    Gallbladder, biliary: Cholecystectomy clips    Pancreas: Unremarkable.    Spleen: Unremarkable.    Stomach, duodenum: Prior gastric surgery intact    Adrenals: Unremarkable.    Kidneys, ureters, bladder: Unremarkable.    Retroperitoneum, lymph nodes:Unremarkable.    Vascular, aorta: Unremarkable.    Small bowel: Unremarkable.    Appendix: Unremarkable.    Colon, rectum:Ileocecal valve lipoma noted.    Reproductive organs:Unremarkable.    Body wall:Unremarkable.    Skeleton: Unremarkable.  Impression: Compared to 04/01/2025, interval 30-40% reduction in patchy predominantly  left-sided consolidation and groundglass with unchanged left posterior lower lobe 13 x 13 mm nodule abutting the pleura series 2 image 76.  No new areas of opacification or nodules.  Thickening of the airways with secretions indicating airway inflammation.  Stable postoperative changes left lingula resection.  No evidence of metastatic disease in the abdomen or pelvis.    All CT scans at [this location] are performed using dose modulation techniques as appropriate to a performed exam including the following: automated exposure control; adjustment of the mA and/or kV according to patient size (this includes techniques or standardized protocols for targeted exams where dose is matched to indication / reason for exam; i.e. extremities or head); use of iterative reconstruction technique.    Finalized on: 5/12/2025 7:33 PM By:  Kyler Horton MD  [facility]# [id number]      2025-05-12 19:35:[id number]     [facility]        Relevant imaging results were reviewed and interpreted by me and per my read shows mild arthritic changes bilaterally.  This was discussed with the patient and / or family today.     Assessment:  Cassandra Campos is a 74 y.o. female following up for chronic right knee pain. Interested in repeating steroid injection today.   Plan: Steroid injection given today (see separate procedure note for details). We discussed the proper protocols after the injection such as no submerging pools, baths tubs, or hot tubs for 24 hr.  Showering is okay today.  We also discussed that blood sugars can be elevated after an injection and asked patient to properly checked her sugars over the next few days and contact their PCP if there are any concerns.  We discussed red flags such as fevers, chills, red, warm, tender joint at the area of injection to please seek medical care immediately.   Continue conservative management for pain.   Follow up as needed. All questions answered.     Primary osteoarthritis of both knees  -     Large Joint  Aspiration/Injection: R knee    Acute pain of right knee  -     Sports Medicine US - Guidance for Needle Placement         Ultrasound guidance was used for needle localization. Images were saved and stored for documentation. The appropriate structures were visualized. Dynamic visualization of the needle was continuous throughout the procedures and maintained good position.      I spent a total of 30 minutes on the day of the visit.  This includes face to face time and non-face to face time preparing to see the patient (eg, review of tests), obtaining and/or reviewing separately obtained history, documenting clinical information in the electronic or other health record, independently interpreting results and communicating results to the patient/family/caregiver, or care coordinator.      Electronically signed:  Naman Barton MD, MPH  2025  1:14 PM         [1]   Social History  Socioeconomic History    Marital status:     Number of children: 1   Occupational History    Occupation:  aid   Tobacco Use    Smoking status: Former     Current packs/day: 0.00     Average packs/day: 0.5 packs/day for 42.0 years (21.0 ttl pk-yrs)     Types: Cigarettes     Start date: 1970     Quit date: 2012     Years since quittin.3     Passive exposure: Past    Smokeless tobacco: Never   Substance and Sexual Activity    Alcohol use: Not Currently     Alcohol/week: 1.0 standard drink of alcohol     Types: 1 Glasses of wine per week     Comment: Glass red wine once a every 2 weeks    Drug use: Never    Sexual activity: Not Currently     Partners: Female     Birth control/protection: Abstinence, None   Social History Narrative    Single, part-time teacher. Masters degree biology.      Social Drivers of Health     Financial Resource Strain: Patient Declined (4/3/2025)    Overall Financial Resource Strain (CARDIA)     Difficulty of Paying Living Expenses: Patient declined   Food Insecurity: Patient  Declined (4/3/2025)    Hunger Vital Sign     Worried About Running Out of Food in the Last Year: Patient declined     Ran Out of Food in the Last Year: Patient declined   Transportation Needs: No Transportation Needs (4/2/2025)    PRAPARE - Transportation     Lack of Transportation (Medical): No     Lack of Transportation (Non-Medical): No   Physical Activity: Insufficiently Active (12/19/2024)    Exercise Vital Sign     Days of Exercise per Week: 3 days     Minutes of Exercise per Session: 20 min   Stress: Stress Concern Present (4/3/2025)    [employer] - Occupational Stress Questionnaire     Feeling of Stress : Very much   Housing Stability: High Risk (4/3/2025)    Housing Stability Vital Sign     Unable to Pay for Housing in the Last Year: Yes     Homeless in the Last Year: No

## 2025-05-22 NOTE — PROCEDURES
Sports Medicine US - Guidance for Needle Placement    Date/Time: 5/22/2025 1:00 PM    Performed by: Naman Barton MD  Authorized by: Naman Barton MD  Preparation: Patient was prepped and draped in the usual sterile fashion.  Local anesthesia used: no    Anesthesia:  Local anesthesia used: no    Sedation:  Patient sedated: no    Patient tolerance: patient tolerated the procedure well with no immediate complications  Comments: Ultrasound guidance was used for needle localization. Images were saved and stored for documentation. The appropriate structures were visualized. Dynamic visualization of the needle was continuous throughout the procedures and maintained good position.

## 2025-05-22 NOTE — PROCEDURES
Large Joint Aspiration/Injection: R knee    Date/Time: 5/22/2025 1:00 PM    Performed by: Naman Barton MD  Authorized by: Naman Barton MD    Consent Done?:  Yes (Verbal)  Indications:  Arthritis and pain  Site marked: the procedure site was marked    Timeout: prior to procedure the correct patient, procedure, and site was verified    Prep: patient was prepped and draped in usual sterile fashion    Local anesthetic:  Bupivacaine 0.5% without epinephrine and lidocaine 1% without epinephrine    Details:  Needle Size:  21 G  Ultrasonic Guidance for needle placement?: Yes    Images are saved and documented.  Approach:  Lateral (superior)  Location:  Knee  Site:  R knee  Medications:  40 mg triamcinolone acetonide 40 mg/mL  Patient tolerance:  Patient tolerated the procedure well with no immediate complications     Ultrasound guidance was used for needle localization. Images were saved and stored for documentation. The appropriate structures were visualized. Dynamic visualization of the needle was continuous throughout the procedures and maintained good position.

## 2025-05-22 NOTE — PATIENT INSTRUCTIONS
Assessment:  Cassandra Campos is a 74 y.o. female   Chief Complaint   Patient presents with    Right Knee - Pain       No diagnosis found.     Plan:  Ultrasound guided cortisone injection to right knee  We discussed the proper protocols after the injection such as no submerging pools, baths tubs, or hot tubs for 24 hr.  Showering is okay today.  We also discussed that blood sugars can be elevated after an injection and asked patient to properly checked her sugars over the next few days and contact their PCP if there are any concerns.  We discussed red flags such as fevers, chills, red, warm, tender joint at the area of injection to please seek medical care immediately.    Follow up as needed    Follow-up: as needed.    Thank you for choosing [facility] and Dr. Naman Barton for your orthopedic & sports medicine care. It is our goal to provide you with exceptional care that will help keep you healthy, active, and get you back in the game.    Please do not hesitate to reach out to us via email, phone, or MyChart with any questions, concerns, or feedback.    If you felt that you received exemplary care today, please consider leaving us feedback on [employer] at:  [email or web address]    If you are experiencing pain/discomfort ,or have questions after 5pm and would like to be connected to the [facility] on-call team, please call this number and specify which Sports Medicine provider is treating you: [phone]

## 2025-05-23 ENCOUNTER — PATIENT MESSAGE (OUTPATIENT)
Dept: HEMATOLOGY/ONCOLOGY | Facility: CLINIC | Age: 75
End: 2025-05-23
Payer: MEDICARE

## 2025-06-03 ENCOUNTER — DOCUMENTATION ONLY (OUTPATIENT)
Dept: PALLIATIVE MEDICINE | Facility: CLINIC | Age: 75
End: 2025-06-03
Payer: MEDICARE

## 2025-06-06 ENCOUNTER — OFFICE VISIT (OUTPATIENT)
Dept: CARDIOLOGY | Facility: CLINIC | Age: 75
End: 2025-06-06
Payer: MEDICARE

## 2025-06-06 ENCOUNTER — PATIENT MESSAGE (OUTPATIENT)
Dept: CARDIOLOGY | Facility: HOSPITAL | Age: 75
End: 2025-06-06
Payer: MEDICARE

## 2025-06-06 VITALS
OXYGEN SATURATION: 97 % | DIASTOLIC BLOOD PRESSURE: 74 MMHG | RESPIRATION RATE: 16 BRPM | BODY MASS INDEX: 25.28 KG/M2 | HEIGHT: 62 IN | WEIGHT: 137.38 LBS | HEART RATE: 99 BPM | SYSTOLIC BLOOD PRESSURE: 105 MMHG

## 2025-06-06 DIAGNOSIS — I10 PRIMARY HYPERTENSION: Primary | ICD-10-CM

## 2025-06-06 DIAGNOSIS — E11.69 HYPERLIPIDEMIA ASSOCIATED WITH TYPE 2 DIABETES MELLITUS: Chronic | ICD-10-CM

## 2025-06-06 DIAGNOSIS — E78.5 HYPERLIPIDEMIA ASSOCIATED WITH TYPE 2 DIABETES MELLITUS: Chronic | ICD-10-CM

## 2025-06-06 DIAGNOSIS — I47.10 PAROXYSMAL SVT (SUPRAVENTRICULAR TACHYCARDIA): ICD-10-CM

## 2025-06-06 DIAGNOSIS — C34.32 MALIGNANT NEOPLASM OF LOWER LOBE OF LEFT LUNG: ICD-10-CM

## 2025-06-06 DIAGNOSIS — I70.0 CALCIFICATION OF AORTA: ICD-10-CM

## 2025-06-06 DIAGNOSIS — I95.9 HYPOTENSION, UNSPECIFIED HYPOTENSION TYPE: ICD-10-CM

## 2025-06-06 DIAGNOSIS — I70.0 AORTIC ATHEROSCLEROSIS: ICD-10-CM

## 2025-06-06 DIAGNOSIS — R79.89 TROPONIN LEVEL ELEVATED: ICD-10-CM

## 2025-06-06 PROCEDURE — 1101F PT FALLS ASSESS-DOCD LE1/YR: CPT | Mod: CPTII,S$GLB,, | Performed by: INTERNAL MEDICINE

## 2025-06-06 PROCEDURE — 99999 PR PBB SHADOW E&M-EST. PATIENT-LVL V: CPT | Mod: PBBFAC,,, | Performed by: INTERNAL MEDICINE

## 2025-06-06 PROCEDURE — 3078F DIAST BP <80 MM HG: CPT | Mod: CPTII,S$GLB,, | Performed by: INTERNAL MEDICINE

## 2025-06-06 PROCEDURE — 3044F HG A1C LEVEL LT 7.0%: CPT | Mod: CPTII,S$GLB,, | Performed by: INTERNAL MEDICINE

## 2025-06-06 PROCEDURE — 1159F MED LIST DOCD IN RCRD: CPT | Mod: CPTII,S$GLB,, | Performed by: INTERNAL MEDICINE

## 2025-06-06 PROCEDURE — 99215 OFFICE O/P EST HI 40 MIN: CPT | Mod: S$GLB,,, | Performed by: INTERNAL MEDICINE

## 2025-06-06 PROCEDURE — 3074F SYST BP LT 130 MM HG: CPT | Mod: CPTII,S$GLB,, | Performed by: INTERNAL MEDICINE

## 2025-06-06 PROCEDURE — 3288F FALL RISK ASSESSMENT DOCD: CPT | Mod: CPTII,S$GLB,, | Performed by: INTERNAL MEDICINE

## 2025-06-06 PROCEDURE — 1160F RVW MEDS BY RX/DR IN RCRD: CPT | Mod: CPTII,S$GLB,, | Performed by: INTERNAL MEDICINE

## 2025-06-06 NOTE — PROGRESS NOTES
Subjective:   Patient ID:  Cassandra Campos is a 74 y.o. female who presents for follow up of No chief complaint on file.      74 yo, female, came in for 6 m f/u  PMH PSVT, pAFL post op, HTN, HLP, NIDDM 10 yrs, obesity, ex smoker, DANIEL, not on CPAP. H/o lung Ca s/p left robotic lingual lobectomy and mediastinal lymph node dissection for a high-grade adenocarcinoma in 05/24.     No h/o MI CVA  Had bariatric surgery done in . Uncomplicated and last 30 pounds.  Walks 2 miles daily.   No smoking and occasional drink  Lives alone and no limitation of exercise  Patient feels OK, no chest pain, no sob, no palpitation, no dizziness, no syncope, no CNS symptoms.    04/24 visit  Plan robotic lobectomy for lung ca. Quit smoking 20 yrs ago.  Walks at home. No chest pain dizziness orthopnea . On inhaler as needed  EKG reviewed by myself today reveals NSR nonspecific STT change sinus tachy and LVH.  No change compared to prior one in 2020.    06/24 visit  S/p Left robotic lingual lobectomy and mediastinal lymph node dissection for a high-grade adenocarcinoma on 05/10/24  Post OP developed afl. And had LAKESHIA and DCCV converted to SR  TODAY EKG NSR  Oncology eval pending  No palpitation dizziness and chest pain   No orthopnea   and BP C    12/24 visit  Repeat vitals in 08/24 and no AFL. d/c eliquis.  Occasional dizziness. No faint chest pain palpitation  No coffee and energy drinking   and BP C    01/25 visit telenote  Plan bronch by Dr. Robin r/o ca  No chest pain dizziness orthopnea dyspnea    Interval history  Chest pain with cough   Wheezing f/u Dr. Laughlin                                         History of Present Illness    CHIEF COMPLAINT:  - Cassandra presents for follow-up after completing chemotherapy and radiation therapy, with concerns about wheezing and recent pneumonia.    HPI:  - completed radiation and chemotherapy  - first infusion administered, second infusion scheduled for Monday  - recent  ED visit due to slightly elevated troponin levels (0.0276, decreasing to 0.039) in the context of pneumonia  - reports chest pain with coughing, localized to the right side under the breast  - wheezing noted, particularly bothersome at night  - consulted Dr. Reyes for wheezing and was prescribed nebulizer treatment  - unable to use nebulizer due to blistering of lips from the liquid, even with mask use  - follow-up visit with Dr. Reyes scheduled for Tuesday  - denies chest pain at rest, dizziness, syncope, and current smoking    CARDIAC HISTORY:  - EKG: unremarkable  - Palpitations described as racing    MEDICATIONS:  - Aspirin 81 mg daily  - Losartan 25 mg daily  - Metoprolol 50 mg daily  - Ozempic    ALLERGIES:  - Nebulizer liquid: blistered lips    TEST RESULTS:  - Troponin: Recently, elevated to 0.0276, then decreased to 0.039, in the setting of pneumonia    IMAGING:  - Echo: Repeat needed due to chemotherapy    MEDICAL HISTORY:  - HTN  - HLD  - Diabetes  - Cancer: Treated with chemotherapy and radiation    SOCIAL HISTORY:  - Smoking: She denies current use          Past Medical History:   Diagnosis Date    Arthritis     hands    Bilateral bunions     Borderline glaucoma     De Quervain's disease (radial styloid tenosynovitis)     Gastritis     upper GI 2/2017    Hydradenitis     Hyperlipidemia     Hypertension     Insomnia     Lung cancer     Migraines 02/01/2000    Nasal septum perforation     Obesity     Pneumonia     Restrictive airway disease     Sleep apnea     SVT (supraventricular tachycardia) 09/2013    Trigger finger     Type 2 diabetes mellitus 2012     am 02/01/2024       Past Surgical History:   Procedure Laterality Date    AXILLARY HIDRADENITIS EXCISION Bilateral     BONE EXOSTOSIS EXCISION Right 07/25/2018    Procedure: EXCISION, EXOSTOSIS;  Surgeon: Jayro Pedraza Sr., MD;  Location: [facility];  Service: Orthopedics;  Laterality: Right;    BREAST BIOPSY Bilateral     both benign    BREAST  LUMPECTOMY  1998    BREAST SURGERY  1998    BRONCHOSCOPY Bilateral 01/15/2025    Procedure: Bronchoscopy - insert lighted tube into airway to take a biopsy of lung;  Surgeon: Adrian Robin MD;  Location: [facility];  Service: Pulmonary;  Laterality: Bilateral;    CARPAL TUNNEL RELEASE      bilateral    CARPAL TUNNEL RELEASE Right 2024    Procedure: RELEASE, CARPAL TUNNEL;  Surgeon: Jaime Oswald MD;  Location: [facility] OR;  Service: Orthopedics;  Laterality: Right;    CARPAL TUNNEL RELEASE Left 2024    Procedure: RELEASE, CARPAL TUNNEL;  Surgeon: Jaime Oswald MD;  Location: [facility] OR;  Service: Orthopedics;  Laterality: Left;    CATARACT EXTRACTION Bilateral     OU     SECTION  1979    CHOLECYSTECTOMY  2014    COLONOSCOPY N/A 10/02/2020    Procedure: COLONOSCOPY;  Surgeon: Tushar Edwards MD;  Location: [facility];  Service: Endoscopy;  Laterality: N/A;    COLONOSCOPY W/ POLYPECTOMY  10/02/2020    Polyps x3, repeat 5 years; Tushar Edwards MD     CYST REMOVAL  2015    sebaceous cyst removed from face    DE QUERVAIN'S RELEASE Left 2020    Procedure: RELEASE, HAND, FOR DEQUERVAIN'S TENOSYNOVITIS;  Surgeon: Jaime Oswald MD;  Location: [facility] OR;  Service: Orthopedics;  Laterality: Left;    DE QUERVAIN'S RELEASE Right 2020    Procedure: RELEASE, HAND, FOR DEQUERVAIN'S TENOSYNOVITIS;  Surgeon: Jaime Oswald MD;  Location: [facility];  Service: Orthopedics;  Laterality: Right;    ENDOBRONCHIAL ULTRASOUND Bilateral 04/10/2024    Procedure: ENDOBRONCHIAL ULTRASOUND (EBUS);  Surgeon: Adrian Robin MD;  Location: [facility];  Service: Pulmonary;  Laterality: Bilateral;    ENDOBRONCHIAL ULTRASOUND Bilateral 01/15/2025    Procedure: ENDOBRONCHIAL ULTRASOUND (EBUS);  Surgeon: Adrian Robin MD;  Location: [facility];  Service: Pulmonary;  Laterality: Bilateral;    EYE SURGERY      gastric sleeve  2017    Dr. Shirley SAM OF  ANESTHETIC AGENT AROUND MULTIPLE INTERCOSTAL NERVES  05/10/2024    Procedure: BLOCK, NERVE, INTERCOSTAL, 2 OR MORE;  Surgeon: Mike Nuñez MD;  Location: [facility] OR;  Service: Cardiothoracic;;    KNEE SURGERY Right     OLECRANON BURSECTOMY Right 07/25/2018    Procedure: BURSECTOMY, OLECRANON;  Surgeon: Jayro Pedraza Sr., MD;  Location: [facility] OR;  Service: Orthopedics;  Laterality: Right;    SURGICAL REMOVAL OF BUNION WITH OSTEOTOMY OF METATARSAL BONE Left 05/10/2019    Procedure: BUNIONECTOMY, WITH METATARSAL OSTEOTOMY;  Surgeon: Srinivasan Villanueva DPM;  Location: [facility] OR;  Service: Podiatry;  Laterality: Left;    SURGICAL REMOVAL OF BUNION WITH OSTEOTOMY OF METATARSAL BONE Right 06/28/2019    Procedure: BUNIONECTOMY, WITH METATARSAL OSTEOTOMY;  Surgeon: Srinivasan Villanueva DPM;  Location: [facility] OR;  Service: Podiatry;  Laterality: Right;    SURGICAL REMOVAL OF LYMPH NODE Left 05/10/2024    Procedure: EXCISION, LYMPH NODE;  Surgeon: Mike Nuñez MD;  Location: [facility] OR;  Service: Cardiothoracic;  Laterality: Left;    TONSILLECTOMY, ADENOIDECTOMY  1980s    TRANSESOPHAGEAL ECHOCARDIOGRAM WITH POSSIBLE CARDIOVERSION (LAKESHIA W/ POSS CARDIOVERSION) N/A 05/15/2024    Procedure: Transesophageal echo (LAKESHIA) intra-procedure log documentation;  Surgeon: Twan Pelaez MD;  Location: [facility] CATH LAB;  Service: Cardiology;  Laterality: N/A;    TRIGGER FINGER RELEASE Right 04/01/2015    Dr. Milo HALL ROBOTIC RATS,WITH LOBECTOMY,LUNG Left 05/10/2024    Procedure: XI ROBOTIC RATS,WITH LOBECTOMY,LUNG;  Surgeon: Mike Nuñez MD;  Location: [facility] OR;  Service: Cardiothoracic;  Laterality: Left;  Lingulectomy       Social History[1]    Family History   Problem Relation Name Age of Onset    Prostate cancer Brother      Diabetes Maternal Aunt Alondra Campos     Diabetes Cousin      Hypertension Maternal Grandmother Portia TSAI    Objective:   Physical Exam  HENT:      Head: Normocephalic.   Eyes:      Pupils: Pupils are equal,  round, and reactive to light.   Neck:      Thyroid: No thyromegaly.      Vascular: Normal carotid pulses. No carotid bruit or JVD.   Cardiovascular:      Rate and Rhythm: Normal rate and regular rhythm. No extrasystoles are present.     Chest Wall: PMI is not displaced.      Pulses: Normal pulses.      Heart sounds: Normal heart sounds. No murmur heard.     No gallop. No S3 sounds.   Pulmonary:      Effort: No respiratory distress.      Breath sounds: Normal breath sounds. No stridor.   Abdominal:      General: Bowel sounds are normal.      Palpations: Abdomen is soft.      Tenderness: There is no abdominal tenderness. There is no rebound.   Skin:     Findings: No rash.   Neurological:      Mental Status: She is alert and oriented to person, place, and time.   Psychiatric:         Behavior: Behavior normal.         Lab Results   Component Value Date    CHOL 157 04/25/2025    CHOL 165 04/16/2025    CHOL 184 12/26/2023     Lab Results   Component Value Date    HDL 40 04/25/2025    HDL 30 (L) 04/16/2025    HDL 48 12/26/2023     Lab Results   Component Value Date    LDLCALC 97.0 04/25/2025    LDLCALC 109.8 04/16/2025    LDLCALC 110.4 12/26/2023     Lab Results   Component Value Date    TRIG 100 04/25/2025    TRIG 126 04/16/2025    TRIG 128 12/26/2023     Lab Results   Component Value Date    CHOLHDL 25.5 04/25/2025    CHOLHDL 18.2 (L) 04/16/2025    CHOLHDL 26.1 12/26/2023       Chemistry        Component Value Date/Time     05/16/2025 1204     03/21/2025 1259    K 3.4 (L) 05/16/2025 1204    K 3.7 03/21/2025 1259     05/16/2025 1204     03/21/2025 1259    CO2 26 05/16/2025 1204    CO2 27 03/21/2025 1259    BUN 12 05/16/2025 1204    CREATININE 0.6 05/16/2025 1204    GLU 81 05/16/2025 1204    GLU 88 03/21/2025 1259        Component Value Date/Time    CALCIUM 9.0 05/16/2025 1204    CALCIUM 9.1 03/21/2025 1259    ALKPHOS 82 05/16/2025 1204    ALKPHOS 73 03/21/2025 1259    AST 90 (H) 05/16/2025 1204     AST 22 03/21/2025 1259    ALT 77 (H) 05/16/2025 1204    ALT 39 03/21/2025 1259    BILITOT 0.2 05/16/2025 1204    BILITOT 0.2 03/21/2025 1259    ESTGFRAFRICA >60 06/21/2022 0935    EGFRNONAA >60 06/21/2022 0935          Lab Results   Component Value Date    HGBA1C 5.4 04/25/2025     Lab Results   Component Value Date    TSH 1.821 04/25/2025     Lab Results   Component Value Date    INR 1.0 12/11/2024    INR 1.1 05/14/2024    INR 0.9 04/04/2024     Lab Results   Component Value Date    WBC 4.70 05/16/2025    HGB 11.5 (L) 05/16/2025    HCT 35.1 (L) 05/16/2025    MCV 80 (L) 05/16/2025     05/16/2025     BMP  Sodium   Date Value Ref Range Status   05/16/2025 140 136 - 145 mmol/L Final   03/21/2025 141 136 - 145 mmol/L Final     Potassium   Date Value Ref Range Status   05/16/2025 3.4 (L) 3.5 - 5.1 mmol/L Final   03/21/2025 3.7 3.5 - 5.1 mmol/L Final     Chloride   Date Value Ref Range Status   05/16/2025 107 95 - 110 mmol/L Final   03/21/2025 109 95 - 110 mmol/L Final     CO2   Date Value Ref Range Status   05/16/2025 26 23 - 29 mmol/L Final   03/21/2025 27 23 - 29 mmol/L Final     BUN   Date Value Ref Range Status   05/16/2025 12 8 - 23 mg/dL Final     Creatinine   Date Value Ref Range Status   05/16/2025 0.6 0.5 - 1.4 mg/dL Final     Calcium   Date Value Ref Range Status   05/16/2025 9.0 8.7 - 10.5 mg/dL Final   03/21/2025 9.1 8.7 - 10.5 mg/dL Final     Anion Gap   Date Value Ref Range Status   05/16/2025 7 (L) 8 - 16 mmol/L Final     eGFR if    Date Value Ref Range Status   06/21/2022 >60 >60 mL/min/1.73 m^2 Final     eGFR if non    Date Value Ref Range Status   06/21/2022 >60 >60 mL/min/1.73 m^2 Final     Comment:     Calculation used to obtain the estimated glomerular filtration  rate (eGFR) is the CKD-EPI equation.        BNP  @LABRCNTIP(BNP,BNPTRIAGEBLO)@  @LABRCNTIP(troponini)@  CrCl cannot be calculated (Patient's most recent lab result is older than the maximum 7 days  allowed.).  No results found in the last 24 hours.  No results found in the last 24 hours.  No results found in the last 24 hours.    Assessment:      1. Primary hypertension    2. Paroxysmal SVT (supraventricular tachycardia)    3. Hypotension, unspecified hypotension type    4. Hyperlipidemia associated with type 2 diabetes mellitus    5. Calcification of aorta    6. Aortic atherosclerosis    7. Malignant neoplasm of lower lobe of left lung    8. Troponin level elevated        Plan:     Assessment & Plan    IMPRESSION:  - Assessed post-chemotherapy and radiation status.  - Evaluated recent ER visit for elevated troponin in setting of pneumonia.  - Evaluated current medication regimen.    HYPERTENSION:  - Continued Losartan 25 mg daily.  - Continued Metoprolol 50 mg daily.    CARDIOVASCULAR:  - Continued Aspirin 81 mg daily.  - Ordered echocardiogram.  - Ordered nuclear stress test due to recent chemotherapy, troponin elevation, and possible demand ischemia from pneumonia.    TYPE 2 DIABETES MELLITUS:  - Continued Ozempic for diabetes.    PNEUMONIA:  - Ordered nuclear stress test due to recent chemotherapy, troponin elevation, and possible demand ischemia from pneumonia.    CANCER:  - Ordered nuclear stress test due to recent chemotherapy, troponin elevation, and possible demand ischemia from pneumonia.    SMOKING CESSATION:  - Cassandra to continue smoking cessation.          Echo and Phar MPI for recent chemo Rx and elevated troponin  Rtc in 6m      This note was generated with the assistance of ambient listening technology. Verbal consent was obtained by the patient and accompanying visitor(s) for the recording of patient appointment to facilitate this note. I attest to having reviewed and edited the generated note for accuracy, though some syntax or spelling errors may persist. Please contact the author of this note for any clarification.            [1]   Social History  Tobacco Use    Smoking status: Former      Current packs/day: 0.00     Average packs/day: 0.5 packs/day for 42.0 years (21.0 ttl pk-yrs)     Types: Cigarettes     Start date: 1970     Quit date: 2012     Years since quittin.4     Passive exposure: Past    Smokeless tobacco: Never   Substance Use Topics    Alcohol use: Not Currently     Alcohol/week: 1.0 standard drink of alcohol     Types: 1 Glasses of wine per week     Comment: Glass red wine once a every 2 weeks    Drug use: Never

## 2025-06-09 NOTE — PROGRESS NOTES
Pulmonary Outpatient  Visit     Subjective:       Patient ID: Cassandra Campos is a 74 y.o. female.    Social History     Tobacco Use   Smoking Status Former    Current packs/day: 0.00    Average packs/day: 0.5 packs/day for 42.0 years (21.0 ttl pk-yrs)    Types: Cigarettes    Start date: 1970    Quit date: 2012    Years since quittin.4    Passive exposure: Past   Smokeless Tobacco Never            Chief Complaint: Follow-up          Cassandra Campos is 73 y.o.  Asked to see by Dr Ball  Abn imaging CT abdomen   No cough no wheezing no shortness of breath   Last seen in this department in to any to any   History of asthma on inhaler   Former smoker quit 20 years ago   Retired teacher   No past no history of cancer   No family history of cancer   Sinus congestion - hole in nose from snorting cocaine  Smoked 1ppd for many years - not smoking now, 15 pack years  Denies weight loss hemoptysis no TB exposure       Imaging reviewed with patient          2024  Followup  Revewed PFT  ABG  Walk  CT biopsy for   Will do EBUS on 04/10  Will need cardiology clearance      2024  Followup  Recent PET CT  Left Hilar increased avidity  FDG avid left hilar adenopathy/mass now demonstrating an SUV max of 9.4 as compared to 4.7 on the prior exam.  Images reveiwed with patient    Path  Regional LN were -ve  REGIONAL LYMPH NODES   Regional Lymph Node Status:  Regional lymph nodes present and negative for tumor      Number of Lymph Nodes with Tumor:  0      Number of Lymph Nodes Examined:  5      Xavi Sites Examined:  Level 10, level 9, level 6, level 5, level 4L     02/10/2025   Follow-up visit   Hilar biopsies were positive for local recurrence of resected lung cancer   +ve 10 L  Planned XRT  No respiratory issues  Currently taking lorazepam, Lunesta and Elavil for sleep.    2025   Follow-up visit   Recent hospitalization  Multifocal pneumonia  Abx  Addended by: SHILA KEARNEY on: 8/27/2019 03:37 PM     Modules accepted: Orders     Cefdnir and doxy  Cough, congestioon, wheezing at night  Earlier this year she completed radiation therapy   No fever currently   Wheezing at night   Yellow sputum   Chest CT reviewed with patient    06/10/2025   Follow-up visit   Completed additional Abx course  FEELING MUCH BETTER  LAST CHEMO mAY 16TH AND INFUSION 3 DAYS LATER  HAD SOME BLISTERING ON LIPS  THINKS THIS WAS FROM ALBUTEROL                          Review of Systems   Constitutional: Negative.    Eyes: Negative.    Respiratory:  Negative for snoring, cough, sputum production, shortness of breath, wheezing, use of rescue inhaler and somnolence.    Cardiovascular: Negative.    Genitourinary: Negative.    Endocrine: endocrine negative    Musculoskeletal: Negative.    Skin: Negative.    Gastrointestinal: Negative.    Psychiatric/Behavioral: Negative.     All other systems reviewed and are negative.      Outpatient Encounter Medications as of 6/10/2025   Medication Sig Dispense Refill    albuterol (PROVENTIL/VENTOLIN HFA) 90 mcg/actuation inhaler Inhale 2 puffs into the lungs every 6 (six) hours as needed for Wheezing. Rescue 18 g 1    albuterol-ipratropium (DUO-NEB) 2.5 mg-0.5 mg/3 mL nebulizer solution Take 3 mLs by nebulization 2 (two) times a day. Rescue 180 mL 11    amitriptyline (ELAVIL) 25 MG tablet Take 1 tablet (25 mg total) by mouth every evening. 90 tablet 1    aspirin (ECOTRIN) 81 MG EC tablet Take 1 tablet (81 mg total) by mouth once daily.      blood sugar diagnostic Strp use to test blood sugar 1-2 times a day 200 strip 3    blood-glucose meter (ACCU-CHEK GUIDE ME GLUCOSE MTR) Misc use to check blood glucose 2 times a day 1 each 0    blood-glucose meter (ACCU-CHEK GUIDE ME GLUCOSE MTR) Misc To check blood glucose 1-2 times daily, to use with insurance preferred meter 1 each 0    eszopiclone (LUNESTA) 3 mg Tab Take 1 tablet (3 mg total) by mouth every evening. 30 tablet 1    lancets Misc Use to test blood glucose 1-2 times a day, discard  lancet after each use 200 each 3    [] levoFLOXacin (LEVAQUIN) 500 MG tablet Take 1 tablet (500 mg total) by mouth once daily. for 10 days 10 tablet 0    LORazepam (ATIVAN) 1 MG tablet Take 1 tablet (1 mg total) by mouth 2 (two) times daily. 60 tablet 1    losartan (COZAAR) 25 MG tablet Take 1 tablet (25 mg total) by mouth once daily. 90 tablet 2    methylPREDNISolone (MEDROL DOSEPACK) 4 mg tablet use as directed 21 tablet 0    metoprolol succinate (TOPROL-XL) 50 MG 24 hr tablet Take 1½ tablets (75 mg total) by mouth once daily. 135 tablet 2    OLANZapine (ZYPREXA) 5 MG tablet Take 1 tablet (5 mg total) by mouth every evening. On days 1-3 of each chemotherapy cycle. 12 tablet 1    omeprazole (PRILOSEC) 20 MG capsule Take 1 capsule (20 mg total) by mouth once daily. 90 capsule 3    oxyCODONE-acetaminophen (PERCOCET)  mg per tablet Take 1 tablet by mouth every 4 (four) hours as needed for Pain. 40 tablet 0    prochlorperazine (COMPAZINE) 5 MG tablet Take 1 tablet (5 mg total) by mouth every 6 (six) hours as needed for Nausea. 20 tablet 5    semaglutide (OZEMPIC) 2 mg/dose (8 mg/3 mL) PnIj Inject 2 mg into the skin every 7 days. 9 mL 0    tamsulosin (FLOMAX) 0.4 mg Cap Take 1 capsule (0.4 mg total) by mouth once daily. 30 capsule 11    traMADoL (ULTRAM) 50 mg tablet Take 1 tablet (50 mg total) by mouth 2 (two) times a day. 60 each 0    [DISCONTINUED] albuterol (PROVENTIL/VENTOLIN HFA) 90 mcg/actuation inhaler INHALE 2 PUFFS INTO THE LUNGS EVERY 4 HOURS AS NEEDED FOR WHEEZING OR SHORTNESS OF BREATH. 18 g 1    [DISCONTINUED] diphenhydrAMINE-aluminum-magnesium hydroxide-simethicone-LIDOcaine viscous HCl 2% Swish and spit 15 mLs every 4 (four) hours as needed. 500 each 3    [DISCONTINUED] eszopiclone (LUNESTA) 3 mg Tab Take 1 tablet (3 mg total) by mouth every evening. 30 tablet 1    [DISCONTINUED] LORazepam (ATIVAN) 1 MG tablet Take 1 tablet (1 mg total) by mouth 2 (two) times daily. 60 tablet 1     [DISCONTINUED] magic mouthwash diphen/antac/lidoc swish and spit 15ml every 4 hours as needed 450 mL 3     No facility-administered encounter medications on file as of 6/10/2025.       The following portions of the patient's history were reviewed and updated as appropriate: She  has a past medical history of Arthritis, Bilateral bunions, Borderline glaucoma, De Quervain's disease (radial styloid tenosynovitis), Gastritis, Hydradenitis, Hyperlipidemia, Hypertension, Insomnia, Lung cancer, Migraines (2000), Nasal septum perforation, Obesity, Pneumonia, Restrictive airway disease, Sleep apnea, SVT (supraventricular tachycardia) (2013), Trigger finger, and Type 2 diabetes mellitus ().  She does not have any pertinent problems on file.  She  has a past surgical history that includes Cholecystectomy (2014); Carpal tunnel release; Axillary hidradenitis excision (Bilateral); Trigger finger release (Right, 2015); Cyst Removal (2015); TONSILLECTOMY, ADENOIDECTOMY ();  section (); gastric sleeve (2017); Knee surgery (Right); Cataract extraction (Bilateral); Breast biopsy (Bilateral); Olecranon bursectomy (Right, 2018); Bone exostosis excision (Right, 2018); Surgical removal of bunion with osteotomy of metatarsal bone (Left, 05/10/2019); Surgical removal of bunion with osteotomy of metatarsal bone (Right, 2019); De Quervain's release (Left, 2020); Colonoscopy w/ polypectomy (10/02/2020); Colonoscopy (N/A, 10/02/2020); De Quervain's release (Right, 2020); Eye surgery; Breast surgery (1998); Carpal tunnel release (Right, 2024); Carpal tunnel release (Left, 2024); Endobronchial ultrasound (Bilateral, 04/10/2024); transesophageal echocardiogram with possible cardioversion (romero w/ poss cardioversion) (N/A, 05/15/2024); xi robotic rats,with lobectomy,lung (Left, 05/10/2024); Surgical removal of lymph node (Left, 05/10/2024); Injection of  anesthetic agent around multiple intercostal nerves (05/10/2024); Bronchoscopy (Bilateral, 01/15/2025); Endobronchial ultrasound (Bilateral, 01/15/2025); and Breast lumpectomy (07/1998).  Her family history includes Diabetes in her cousin and maternal aunt; Hypertension in her maternal grandmother; Prostate cancer in her brother.  She  reports that she quit smoking about 13 years ago. Her smoking use included cigarettes. She started smoking about 55 years ago. She has a 21 pack-year smoking history. She has been exposed to tobacco smoke. She has never used smokeless tobacco. She reports that she does not currently use alcohol after a past usage of about 1.0 standard drink of alcohol per week. She reports that she does not use drugs.  She has a current medication list which includes the following prescription(s): albuterol, albuterol-ipratropium, amitriptyline, aspirin, blood sugar diagnostic, blood-glucose meter, eszopiclone, lancets, lorazepam, losartan, methylprednisolone, metoprolol succinate, olanzapine, omeprazole, oxycodone-acetaminophen, prochlorperazine, ozempic, tamsulosin, and tramadol.  Current Outpatient Medications on File Prior to Visit   Medication Sig Dispense Refill    albuterol-ipratropium (DUO-NEB) 2.5 mg-0.5 mg/3 mL nebulizer solution Take 3 mLs by nebulization 2 (two) times a day. Rescue 180 mL 11    amitriptyline (ELAVIL) 25 MG tablet Take 1 tablet (25 mg total) by mouth every evening. 90 tablet 1    aspirin (ECOTRIN) 81 MG EC tablet Take 1 tablet (81 mg total) by mouth once daily.      blood sugar diagnostic Strp use to test blood sugar 1-2 times a day 200 strip 3    blood-glucose meter (ACCU-CHEK GUIDE ME GLUCOSE MTR) Misc To check blood glucose 1-2 times daily, to use with insurance preferred meter 1 each 0    eszopiclone (LUNESTA) 3 mg Tab Take 1 tablet (3 mg total) by mouth every evening. 30 tablet 1    lancets Misc Use to test blood glucose 1-2 times a day, discard lancet after each use  200 each 3    LORazepam (ATIVAN) 1 MG tablet Take 1 tablet (1 mg total) by mouth 2 (two) times daily. 60 tablet 1    losartan (COZAAR) 25 MG tablet Take 1 tablet (25 mg total) by mouth once daily. 90 tablet 2    methylPREDNISolone (MEDROL DOSEPACK) 4 mg tablet use as directed 21 tablet 0    metoprolol succinate (TOPROL-XL) 50 MG 24 hr tablet Take 1½ tablets (75 mg total) by mouth once daily. 135 tablet 2    OLANZapine (ZYPREXA) 5 MG tablet Take 1 tablet (5 mg total) by mouth every evening. On days 1-3 of each chemotherapy cycle. 12 tablet 1    omeprazole (PRILOSEC) 20 MG capsule Take 1 capsule (20 mg total) by mouth once daily. 90 capsule 3    oxyCODONE-acetaminophen (PERCOCET)  mg per tablet Take 1 tablet by mouth every 4 (four) hours as needed for Pain. 40 tablet 0    prochlorperazine (COMPAZINE) 5 MG tablet Take 1 tablet (5 mg total) by mouth every 6 (six) hours as needed for Nausea. 20 tablet 5    semaglutide (OZEMPIC) 2 mg/dose (8 mg/3 mL) PnIj Inject 2 mg into the skin every 7 days. 9 mL 0    tamsulosin (FLOMAX) 0.4 mg Cap Take 1 capsule (0.4 mg total) by mouth once daily. 30 capsule 11    traMADoL (ULTRAM) 50 mg tablet Take 1 tablet (50 mg total) by mouth 2 (two) times a day. 60 each 0    [DISCONTINUED] albuterol (PROVENTIL/VENTOLIN HFA) 90 mcg/actuation inhaler INHALE 2 PUFFS INTO THE LUNGS EVERY 4 HOURS AS NEEDED FOR WHEEZING OR SHORTNESS OF BREATH. 18 g 1     No current facility-administered medications on file prior to visit.     She has no known allergies..      BP Readings from Last 3 Encounters:   06/10/25 100/70   06/06/25 105/74   05/19/25 105/65     Snoring / Sleep:            MMRC Dyspnea Scale (4 is worst)     [x] MMRC 0: Dyspneic on strenuous excercise (0 points)    [] MMRC 1: Dyspneic on walking a slight hill (0 points)    [] MMRC 2: Dyspneic on walking level ground; must stop occasionally due to breathlessness (1 point)    [] MMRC 3: Must stop for breathlessness after walking 100 yards or  "after a few minutes (2 points)    [] MMRC 4: Cannot leave house; breathless on dressing/undressing (3 points)       Asthma Control Test  In the past 4  weeks, how much of the time did your asthma keep you from getting as much done at work, school or at home?: Some of the time  During the past 4 weeks, how often have you had shortness of breath?: Not at all  During the past 4 weeks, how often did your asthma symptoms (wheezing, couging, shortness of breath, chest tightness or pain) wake you up at night or earlier than usual in the morning?: 2 or 3 nights a week  During the past 4 weeks, how often have you used your rescue inhaler or nebulizer medication (such as albuterol)?: 2 or 3 times a week  How would you rate your asthma control during the past 4 weeks?: Somewhat controlled  If your score is 19 or less, your asthma may not be under control: 16                  No data to display                          Objective:     Vital Signs (Most Recent)  Vital Signs  Pulse: 100  Resp: 18  SpO2: 98 %  BP: 100/70  Height and Weight  Height: 5' 2" (157.5 cm)  Weight: 62.6 kg (138 lb 1.9 oz)  BSA (Calculated - sq m): 1.66 sq meters  BMI (Calculated): 25.3  Weight in (lb) to have BMI = 25: 136.4]  Wt Readings from Last 2 Encounters:   06/10/25 62.6 kg (138 lb 1.9 oz)   06/06/25 62.3 kg (137 lb 5.6 oz)       Physical Exam  Vitals and nursing note reviewed.   Constitutional:       Appearance: She is normal weight.   HENT:      Head: Normocephalic and atraumatic.      Nose: Nose normal.   Eyes:      Pupils: Pupils are equal, round, and reactive to light.   Cardiovascular:      Rate and Rhythm: Normal rate and regular rhythm.      Pulses: Normal pulses.      Heart sounds: Normal heart sounds.   Pulmonary:      Effort: Pulmonary effort is normal.      Breath sounds: No wheezing or rhonchi.   Abdominal:      General: Bowel sounds are normal.      Palpations: Abdomen is soft.   Musculoskeletal:      Cervical back: Normal range of " "motion.   Skin:     General: Skin is warm.   Neurological:      General: No focal deficit present.      Mental Status: She is alert and oriented to person, place, and time.   Psychiatric:         Mood and Affect: Mood normal.          Laboratory  Lab Results   Component Value Date    WBC 4.70 05/16/2025    RBC 4.38 05/16/2025    HGB 11.5 (L) 05/16/2025    HCT 35.1 (L) 05/16/2025    MCV 80 (L) 05/16/2025    MCH 26.3 (L) 05/16/2025    MCHC 32.8 05/16/2025    RDW 16.1 (H) 05/16/2025     05/16/2025    MPV 8.0 (L) 05/16/2025    GRAN 1.2 04/01/2025    LYMPH 23.0 05/16/2025    LYMPH 1.08 05/16/2025    MONO 13.6 05/16/2025    MONO 0.64 05/16/2025    EOS 0.4 05/16/2025    EOS 0.02 05/16/2025    BASO 0.02 03/21/2025    EOSINOPHIL 0.4 03/21/2025    BASOPHIL 0.2 05/16/2025    BASOPHIL 0.01 05/16/2025       BMP  Lab Results   Component Value Date     05/16/2025    K 3.4 (L) 05/16/2025     05/16/2025    CO2 26 05/16/2025    BUN 12 05/16/2025    CREATININE 0.6 05/16/2025    CALCIUM 9.0 05/16/2025    ANIONGAP 7 (L) 05/16/2025    ESTGFRAFRICA >60 06/21/2022    EGFRNONAA >60 06/21/2022    AST 90 (H) 05/16/2025    ALT 77 (H) 05/16/2025    PROT 7.5 05/16/2025          No results found for: "IGE"     No results found for: "ASPERGILLUS"  No results found for: "AFUMIGATUSCL"     No results found for: "ACE"     Diagnostic Results:  I have personally reviewed today the following studies:    Sports Medicine US - Guidance for Needle Placement  Naman Barton MD     5/22/2025  1:35 PM  Sports Medicine US - Guidance for Needle Placement    Date/Time: 5/22/2025 1:00 PM    Performed by: Naman Barton MD  Authorized by: Naman Barton MD  Preparation: Patient was prepped and   draped in the usual sterile fashion.  Local anesthesia used: no    Anesthesia:  Local anesthesia used: no    Sedation:  Patient sedated: no    Patient tolerance: patient tolerated the procedure well with no immediate   complications  Comments: " "Ultrasound guidance was used for needle localization. Images   were saved and stored for documentation. The appropriate structures were   visualized. Dynamic visualization of the needle was continuous throughout   the procedures and maintained good position.          No results for input(s): "PH", "PCO2", "PO2", "HCO3", "POCSATURATED", "BE" in the last 72 hours.                          Assessment/Plan:     Problem List Items Addressed This Visit       Hyperlipidemia associated with type 2 diabetes mellitus (Chronic)    Solitary pulmonary nodule    Chronic bronchitis, unspecified chronic bronchitis type - Primary    Relevant Orders    Spirometry with/without bronchodilator    Type 2 diabetes mellitus with hyperglycemia, without long-term current use of insulin    Primary hypertension    Malignant neoplasm of lower lobe of left lung     Other Visit Diagnoses         Acute bronchitis, unspecified organism        Relevant Medications    albuterol (PROVENTIL/VENTOLIN HFA) 90 mcg/actuation inhaler              Reassurance  Improved  Chest ct 3 months       Follow up in about 3 months (around 9/10/2025), or refill meds, ami.    This note was prepared using voice recognition system and is likely to have sound alike errors that may have been overlooked even after proof reading.  Please call me with any questions    Discussed diagnosis, its evaluation, treatment and usual course. All questions answered.    Thank you for the courtesy of participating in the care of this patient    Adrian Robin MD      Personal Diagnostic Review  []  CXR    []  ECHO    []  ONSAT    []  6MWD    []  LABS    []  CHEST CT    []  PET CT    []  Biopsy results              "

## 2025-06-10 ENCOUNTER — OFFICE VISIT (OUTPATIENT)
Dept: PULMONOLOGY | Facility: CLINIC | Age: 75
End: 2025-06-10
Payer: MEDICARE

## 2025-06-10 VITALS
SYSTOLIC BLOOD PRESSURE: 100 MMHG | WEIGHT: 138.13 LBS | HEIGHT: 62 IN | RESPIRATION RATE: 18 BRPM | HEART RATE: 100 BPM | BODY MASS INDEX: 25.42 KG/M2 | DIASTOLIC BLOOD PRESSURE: 70 MMHG | OXYGEN SATURATION: 98 %

## 2025-06-10 DIAGNOSIS — C34.32 MALIGNANT NEOPLASM OF LOWER LOBE OF LEFT LUNG: ICD-10-CM

## 2025-06-10 DIAGNOSIS — I10 PRIMARY HYPERTENSION: ICD-10-CM

## 2025-06-10 DIAGNOSIS — J20.9 ACUTE BRONCHITIS, UNSPECIFIED ORGANISM: ICD-10-CM

## 2025-06-10 DIAGNOSIS — E78.5 HYPERLIPIDEMIA ASSOCIATED WITH TYPE 2 DIABETES MELLITUS: Chronic | ICD-10-CM

## 2025-06-10 DIAGNOSIS — E11.69 HYPERLIPIDEMIA ASSOCIATED WITH TYPE 2 DIABETES MELLITUS: Chronic | ICD-10-CM

## 2025-06-10 DIAGNOSIS — E11.65 TYPE 2 DIABETES MELLITUS WITH HYPERGLYCEMIA, WITHOUT LONG-TERM CURRENT USE OF INSULIN: ICD-10-CM

## 2025-06-10 DIAGNOSIS — R91.1 SOLITARY PULMONARY NODULE: ICD-10-CM

## 2025-06-10 DIAGNOSIS — J42 CHRONIC BRONCHITIS, UNSPECIFIED CHRONIC BRONCHITIS TYPE: Primary | ICD-10-CM

## 2025-06-10 PROCEDURE — 3044F HG A1C LEVEL LT 7.0%: CPT | Mod: CPTII,S$GLB,, | Performed by: INTERNAL MEDICINE

## 2025-06-10 PROCEDURE — 99999 PR PBB SHADOW E&M-EST. PATIENT-LVL IV: CPT | Mod: PBBFAC,,, | Performed by: INTERNAL MEDICINE

## 2025-06-10 PROCEDURE — 99214 OFFICE O/P EST MOD 30 MIN: CPT | Mod: S$GLB,,, | Performed by: INTERNAL MEDICINE

## 2025-06-10 PROCEDURE — 1160F RVW MEDS BY RX/DR IN RCRD: CPT | Mod: CPTII,S$GLB,, | Performed by: INTERNAL MEDICINE

## 2025-06-10 PROCEDURE — 3288F FALL RISK ASSESSMENT DOCD: CPT | Mod: CPTII,S$GLB,, | Performed by: INTERNAL MEDICINE

## 2025-06-10 PROCEDURE — 1101F PT FALLS ASSESS-DOCD LE1/YR: CPT | Mod: CPTII,S$GLB,, | Performed by: INTERNAL MEDICINE

## 2025-06-10 PROCEDURE — 1159F MED LIST DOCD IN RCRD: CPT | Mod: CPTII,S$GLB,, | Performed by: INTERNAL MEDICINE

## 2025-06-10 PROCEDURE — 3078F DIAST BP <80 MM HG: CPT | Mod: CPTII,S$GLB,, | Performed by: INTERNAL MEDICINE

## 2025-06-10 PROCEDURE — 3074F SYST BP LT 130 MM HG: CPT | Mod: CPTII,S$GLB,, | Performed by: INTERNAL MEDICINE

## 2025-06-10 RX ORDER — ALBUTEROL SULFATE 90 UG/1
2 INHALANT RESPIRATORY (INHALATION) EVERY 6 HOURS PRN
Qty: 18 G | Refills: 1 | Status: SHIPPED | OUTPATIENT
Start: 2025-06-10

## 2025-06-13 ENCOUNTER — RESULTS FOLLOW-UP (OUTPATIENT)
Dept: HEMATOLOGY/ONCOLOGY | Facility: CLINIC | Age: 75
End: 2025-06-13

## 2025-06-13 ENCOUNTER — HOSPITAL ENCOUNTER (OUTPATIENT)
Dept: RADIOLOGY | Facility: HOSPITAL | Age: 75
Discharge: HOME | End: 2025-06-13
Attending: INTERNAL MEDICINE
Payer: MEDICARE

## 2025-06-13 ENCOUNTER — OFFICE VISIT (OUTPATIENT)
Dept: HEMATOLOGY/ONCOLOGY | Facility: CLINIC | Age: 75
End: 2025-06-13
Payer: MEDICARE

## 2025-06-13 VITALS
WEIGHT: 140.56 LBS | SYSTOLIC BLOOD PRESSURE: 122 MMHG | HEART RATE: 95 BPM | OXYGEN SATURATION: 95 % | TEMPERATURE: 98 F | BODY MASS INDEX: 25.87 KG/M2 | HEIGHT: 62 IN | DIASTOLIC BLOOD PRESSURE: 85 MMHG

## 2025-06-13 DIAGNOSIS — D84.822 IMMUNODEFICIENCY SECONDARY TO RADIATION THERAPY: ICD-10-CM
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
## 2025-06-13 DIAGNOSIS — Z79.69 IMMUNODEFICIENCY DUE TO CHEMOTHERAPY: ICD-10-CM

## 2025-06-13 DIAGNOSIS — R05.1 ACUTE COUGH: Primary | ICD-10-CM

## 2025-06-13 DIAGNOSIS — T45.1X5A IMMUNODEFICIENCY DUE TO CHEMOTHERAPY: ICD-10-CM

## 2025-06-13 DIAGNOSIS — C34.32 MALIGNANT NEOPLASM OF LOWER LOBE OF LEFT LUNG: ICD-10-CM

## 2025-06-13 DIAGNOSIS — D84.821 IMMUNODEFICIENCY DUE TO CHEMOTHERAPY: ICD-10-CM

## 2025-06-13 PROCEDURE — 99999 PR PBB SHADOW E&M-EST. PATIENT-LVL V: CPT | Mod: PBBFAC,,, | Performed by: INTERNAL MEDICINE

## 2025-06-13 PROCEDURE — 71250 CT THORAX DX C-: CPT | Mod: 26,,, | Performed by: RADIOLOGY

## 2025-06-13 PROCEDURE — 71250 CT THORAX DX C-: CPT | Mod: TC

## 2025-06-13 RX ORDER — METHYLPREDNISOLONE 4 MG/1
4 TABLET ORAL DAILY
Qty: 21 EACH | Refills: 0 | Status: SHIPPED | OUTPATIENT
Start: 2025-06-13 | End: 2026-06-13

## 2025-06-13 NOTE — PROGRESS NOTES
Subjective:       Patient ID: Cassandra Campos is a 74 y.o. female.    Chief Complaint: Results, Lung Cancer, and Chemotherapy    HPI:  74-year-old female stage III non-small cell lung carcinoma presents for cycle 2 day 1 of durvalumab maintenance immunotherapy.  Patient reports cough over the last several days has been seen by Pulmonary Medicine possibility of radiation was discussed.  ECOG status 1    Past Medical History:   Diagnosis Date    Arthritis     hands    Bilateral bunions     Borderline glaucoma     De Quervain's disease (radial styloid tenosynovitis)     Gastritis     upper GI 2/2017    Hydradenitis     Hyperlipidemia     Hypertension     Insomnia     Lung cancer     Migraines 02/01/2000    Nasal septum perforation     Obesity     Pneumonia     Restrictive airway disease     Sleep apnea     SVT (supraventricular tachycardia) 09/2013    Trigger finger     Type 2 diabetes mellitus 2012     am 02/01/2024     Family History   Problem Relation Name Age of Onset    Prostate cancer Brother      Diabetes Maternal Aunt Alondra Campos     Diabetes Cousin      Hypertension Maternal Grandmother Portia Orosco      Social History[1]  Past Surgical History:   Procedure Laterality Date    AXILLARY HIDRADENITIS EXCISION Bilateral     BONE EXOSTOSIS EXCISION Right 07/25/2018    Procedure: EXCISION, EXOSTOSIS;  Surgeon: Jayro Pedraza Sr., MD;  Location: [facility] OR;  Service: Orthopedics;  Laterality: Right;    BREAST BIOPSY Bilateral     both benign    BREAST LUMPECTOMY  07/1998    BREAST SURGERY  07/1998    BRONCHOSCOPY Bilateral 01/15/2025    Procedure: Bronchoscopy - insert lighted tube into airway to take a biopsy of lung;  Surgeon: Adrian Robin MD;  Location: [facility] ENDO;  Service: Pulmonary;  Laterality: Bilateral;    CARPAL TUNNEL RELEASE      bilateral    CARPAL TUNNEL RELEASE Right 02/09/2024    Procedure: RELEASE, CARPAL TUNNEL;  Surgeon: Jaime Oswald MD;  Location: [facility] OR;  Service:  Orthopedics;  Laterality: Right;    CARPAL TUNNEL RELEASE Left 2024    Procedure: RELEASE, CARPAL TUNNEL;  Surgeon: Jaime Oswald MD;  Location: [facility];  Service: Orthopedics;  Laterality: Left;    CATARACT EXTRACTION Bilateral     OU     SECTION  1979    CHOLECYSTECTOMY  2014    COLONOSCOPY N/A 10/02/2020    Procedure: COLONOSCOPY;  Surgeon: Tushar Edwards MD;  Location: [facility];  Service: Endoscopy;  Laterality: N/A;    COLONOSCOPY W/ POLYPECTOMY  10/02/2020    Polyps x3, repeat 5 years; Tushar Edwards MD     CYST REMOVAL  2015    sebaceous cyst removed from face    DE QUERVAIN'S RELEASE Left 2020    Procedure: RELEASE, HAND, FOR DEQUERVAIN'S TENOSYNOVITIS;  Surgeon: Jaime Oswald MD;  Location: [facility] OR;  Service: Orthopedics;  Laterality: Left;    DE QUERVAIN'S RELEASE Right 2020    Procedure: RELEASE, HAND, FOR DEQUERVAIN'S TENOSYNOVITIS;  Surgeon: Jaime Oswald MD;  Location: [facility];  Service: Orthopedics;  Laterality: Right;    ENDOBRONCHIAL ULTRASOUND Bilateral 04/10/2024    Procedure: ENDOBRONCHIAL ULTRASOUND (EBUS);  Surgeon: Adrian Robin MD;  Location: [facility];  Service: Pulmonary;  Laterality: Bilateral;    ENDOBRONCHIAL ULTRASOUND Bilateral 01/15/2025    Procedure: ENDOBRONCHIAL ULTRASOUND (EBUS);  Surgeon: Adrian Robin MD;  Location: [facility];  Service: Pulmonary;  Laterality: Bilateral;    EYE SURGERY      gastric sleeve  2017    Dr. Watson    INJECTION OF ANESTHETIC AGENT AROUND MULTIPLE INTERCOSTAL NERVES  05/10/2024    Procedure: BLOCK, NERVE, INTERCOSTAL, 2 OR MORE;  Surgeon: Mike Nuñez MD;  Location: [facility];  Service: Cardiothoracic;;    KNEE SURGERY Right     OLECRANON BURSECTOMY Right 2018    Procedure: BURSECTOMY, OLECRANON;  Surgeon: Jayro Pedraza Sr., MD;  Location: [facility];  Service: Orthopedics;  Laterality: Right;    SURGICAL REMOVAL OF BUNION WITH OSTEOTOMY OF METATARSAL BONE Left  05/10/2019    Procedure: BUNIONECTOMY, WITH METATARSAL OSTEOTOMY;  Surgeon: Srinivasan Villanueva DPM;  Location: [facility] OR;  Service: Podiatry;  Laterality: Left;    SURGICAL REMOVAL OF BUNION WITH OSTEOTOMY OF METATARSAL BONE Right 06/28/2019    Procedure: BUNIONECTOMY, WITH METATARSAL OSTEOTOMY;  Surgeon: Srinivasan Villanueva DPM;  Location: [facility] OR;  Service: Podiatry;  Laterality: Right;    SURGICAL REMOVAL OF LYMPH NODE Left 05/10/2024    Procedure: EXCISION, LYMPH NODE;  Surgeon: Mike Nuñez MD;  Location: [facility] OR;  Service: Cardiothoracic;  Laterality: Left;    TONSILLECTOMY, ADENOIDECTOMY  1980s    TRANSESOPHAGEAL ECHOCARDIOGRAM WITH POSSIBLE CARDIOVERSION (LAKESHIA W/ POSS CARDIOVERSION) N/A 05/15/2024    Procedure: Transesophageal echo (LAKESHIA) intra-procedure log documentation;  Surgeon: Twan Pelaez MD;  Location: [facility] CATH LAB;  Service: Cardiology;  Laterality: N/A;    TRIGGER FINGER RELEASE Right 04/01/2015    Dr. Milo HALL ROBOTIC RATS,WITH LOBECTOMY,LUNG Left 05/10/2024    Procedure: XI ROBOTIC RATS,WITH LOBECTOMY,LUNG;  Surgeon: Mike Nuñez MD;  Location: [facility] OR;  Service: Cardiothoracic;  Laterality: Left;  Lingulectomy       Labs:  Lab Results   Component Value Date    WBC 4.94 06/13/2025    HGB 12.3 06/13/2025    HCT 37.4 06/13/2025    MCV 80 (L) 06/13/2025     06/13/2025     BMP  Lab Results   Component Value Date     06/13/2025    K 3.8 06/13/2025     06/13/2025    CO2 23 06/13/2025    BUN 5 (L) 06/13/2025    CREATININE 0.6 06/13/2025    CALCIUM 9.1 06/13/2025    ANIONGAP 11 06/13/2025    ESTGFRAFRICA >60 06/21/2022    EGFRNONAA >60 06/21/2022     Lab Results   Component Value Date    ALT 26 06/13/2025    AST 30 06/13/2025    ALKPHOS 107 06/13/2025    BILITOT 0.4 06/13/2025       Lab Results   Component Value Date    IRON 112 09/12/2022    TIBC 448 11/28/2016    FERRITIN 35 09/12/2022     Lab Results   Component Value Date    NYIKSDZJ44 1440 (H) 08/14/2018     Lab Results    Component Value Date    FOLATE 15.5 11/28/2016     Lab Results   Component Value Date    TSH 1.821 04/25/2025         Review of Systems   Constitutional:  Negative for activity change, appetite change, chills, diaphoresis, fatigue, fever and unexpected weight change.   HENT:  Negative for congestion, dental problem, drooling, ear discharge, ear pain, facial swelling, hearing loss, mouth sores, nosebleeds, postnasal drip, rhinorrhea, sinus pressure, sneezing, sore throat, tinnitus, trouble swallowing and voice change.    Eyes:  Negative for photophobia, pain, discharge, redness, itching and visual disturbance.   Respiratory:  Positive for cough. Negative for choking, chest tightness, shortness of breath, wheezing and stridor.    Cardiovascular:  Negative for chest pain, palpitations and leg swelling.   Gastrointestinal:  Negative for abdominal distention, abdominal pain, anal bleeding, blood in stool, constipation, diarrhea, nausea, rectal pain and vomiting.   Endocrine: Negative for cold intolerance, heat intolerance, polydipsia, polyphagia and polyuria.   Genitourinary:  Negative for decreased urine volume, difficulty urinating, dyspareunia, dysuria, enuresis, flank pain, frequency, genital sores, hematuria, menstrual problem, pelvic pain, urgency, vaginal bleeding, vaginal discharge and vaginal pain.   Musculoskeletal:  Negative for arthralgias, back pain, gait problem, joint swelling, myalgias, neck pain and neck stiffness.   Skin:  Negative for color change, pallor and rash.   Allergic/Immunologic: Negative for environmental allergies, food allergies and immunocompromised state.   Neurological:  Negative for dizziness, tremors, seizures, syncope, facial asymmetry, speech difficulty, weakness, light-headedness, numbness and headaches.   Hematological:  Negative for adenopathy. Does not bruise/bleed easily.   Psychiatric/Behavioral:  Negative for agitation, behavioral problems, confusion, decreased  concentration, dysphoric mood, hallucinations, self-injury, sleep disturbance and suicidal ideas. The patient is not nervous/anxious and is not hyperactive.        Objective:      Physical Exam  Vitals reviewed.   Constitutional:       General: She is not in acute distress.     Appearance: She is well-developed. She is not diaphoretic.   HENT:      Head: Normocephalic and atraumatic.      Right Ear: External ear normal.      Left Ear: External ear normal.      Nose: Nose normal.      Right Sinus: No maxillary sinus tenderness or frontal sinus tenderness.      Left Sinus: No maxillary sinus tenderness or frontal sinus tenderness.      Mouth/Throat:      Pharynx: No oropharyngeal exudate.   Eyes:      General: Lids are normal. No scleral icterus.        Right eye: No discharge.         Left eye: No discharge.      Conjunctiva/sclera: Conjunctivae normal.      Right eye: Right conjunctiva is not injected. No hemorrhage.     Left eye: Left conjunctiva is not injected. No hemorrhage.     Pupils: Pupils are equal, round, and reactive to light.   Neck:      Thyroid: No thyromegaly.      Vascular: No JVD.      Trachea: No tracheal deviation.   Cardiovascular:      Rate and Rhythm: Normal rate and regular rhythm.      Heart sounds: Normal heart sounds.   Pulmonary:      Effort: Pulmonary effort is normal. No respiratory distress.      Breath sounds: Normal breath sounds. No stridor.   Chest:      Chest wall: No tenderness.   Abdominal:      General: Bowel sounds are normal. There is no distension.      Palpations: Abdomen is soft. There is no hepatomegaly, splenomegaly or mass.      Tenderness: There is no abdominal tenderness. There is no rebound.   Musculoskeletal:         General: No tenderness. Normal range of motion.      Cervical back: Normal range of motion and neck supple.   Lymphadenopathy:      Cervical: No cervical adenopathy.      Upper Body:      Right upper body: No supraclavicular adenopathy.      Left upper  body: No supraclavicular adenopathy.   Skin:     General: Skin is dry.      Findings: No erythema or rash.   Neurological:      Mental Status: She is alert and oriented to person, place, and time.      Cranial Nerves: No cranial nerve deficit.      Coordination: Coordination normal.   Psychiatric:         Behavior: Behavior normal.         Thought Content: Thought content normal.         Judgment: Judgment normal.             Assessment:      1. Acute cough    2. Malignant neoplasm of lower lobe of left lung    3. Immunodeficiency secondary to radiation therapy    4. Immunodeficiency due to chemotherapy           Med Onc Chart Routing      Follow up with physician . Return in 1 month CBC CMP TSH   Follow up with [facility]    Infusion scheduling note    Injection scheduling note Durvalumab on Monday in follow-up in 1 month   Labs    Imaging   Stat CT of chest   Pharmacy appointment    Other referrals                 Plan:     Patient will return after CT chest to see whether not radiation pneumonitis.  If so will treat with prednisone hold immunotherapy will see back afterwards for review; x-ray no active pneumonia perhaps some findings secondary to radiation will start on Medrol Dosepak to see how well patient response continue to treat with immunotherapy on Monday return in 1 month        Emerson Moran Jr, MD FACP         [1]   Social History  Socioeconomic History    Marital status:     Number of children: 1   Occupational History    Occupation:  aid   Tobacco Use    Smoking status: Former     Current packs/day: 0.00     Average packs/day: 0.5 packs/day for 42.0 years (21.0 ttl pk-yrs)     Types: Cigarettes     Start date: 1970     Quit date: 2012     Years since quittin.4     Passive exposure: Past    Smokeless tobacco: Never   Substance and Sexual Activity    Alcohol use: Not Currently     Alcohol/week: 1.0 standard drink of alcohol     Types: 1 Glasses of wine per week      Comment: Glass red wine once a every 2 weeks    Drug use: Never    Sexual activity: Not Currently     Partners: Female     Birth control/protection: Abstinence, None   Social History Narrative    Single, part-time teacher. Masters degree biology.      Social Drivers of Health     Financial Resource Strain: Patient Declined (4/3/2025)    Overall Financial Resource Strain (CARDIA)     Difficulty of Paying Living Expenses: Patient declined   Food Insecurity: Patient Declined (4/3/2025)    Hunger Vital Sign     Worried About Running Out of Food in the Last Year: Patient declined     Ran Out of Food in the Last Year: Patient declined   Transportation Needs: No Transportation Needs (4/2/2025)    PRAPARE - Transportation     Lack of Transportation (Medical): No     Lack of Transportation (Non-Medical): No   Physical Activity: Insufficiently Active (12/19/2024)    Exercise Vital Sign     Days of Exercise per Week: 3 days     Minutes of Exercise per Session: 20 min   Stress: Stress Concern Present (4/3/2025)    [employer] - Occupational Stress Questionnaire     Feeling of Stress : Very much   Housing Stability: High Risk (4/3/2025)    Housing Stability Vital Sign     Unable to Pay for Housing in the Last Year: Yes     Homeless in the Last Year: No

## 2025-06-16 ENCOUNTER — INFUSION (OUTPATIENT)
Dept: INFUSION THERAPY | Facility: HOSPITAL | Age: 75
End: 2025-06-16
Attending: INTERNAL MEDICINE
Payer: MEDICARE

## 2025-06-16 VITALS
WEIGHT: 140.44 LBS | BODY MASS INDEX: 25.84 KG/M2 | SYSTOLIC BLOOD PRESSURE: 117 MMHG | TEMPERATURE: 97 F | DIASTOLIC BLOOD PRESSURE: 83 MMHG | HEART RATE: 100 BPM | HEIGHT: 62 IN | OXYGEN SATURATION: 96 % | RESPIRATION RATE: 16 BRPM

## 2025-06-16 DIAGNOSIS — C34.32 MALIGNANT NEOPLASM OF LOWER LOBE OF LEFT LUNG: Primary | ICD-10-CM

## 2025-06-16 PROCEDURE — 96413 CHEMO IV INFUSION 1 HR: CPT

## 2025-06-16 PROCEDURE — 63600175 PHARM REV CODE 636 W HCPCS: Mod: JZ,TB | Performed by: INTERNAL MEDICINE

## 2025-06-16 PROCEDURE — 25000003 PHARM REV CODE 250: Performed by: INTERNAL MEDICINE

## 2025-06-16 RX ORDER — EPINEPHRINE 0.3 MG/.3ML
0.3 INJECTION SUBCUTANEOUS ONCE AS NEEDED
Status: CANCELLED | OUTPATIENT
Start: 2025-06-16

## 2025-06-16 RX ORDER — DIPHENHYDRAMINE HYDROCHLORIDE 50 MG/ML
50 INJECTION, SOLUTION INTRAMUSCULAR; INTRAVENOUS ONCE AS NEEDED
Status: CANCELLED | OUTPATIENT
Start: 2025-06-16

## 2025-06-16 RX ORDER — HEPARIN 100 UNIT/ML
500 SYRINGE INTRAVENOUS
Status: CANCELLED | OUTPATIENT
Start: 2025-06-16

## 2025-06-16 RX ORDER — SODIUM CHLORIDE 0.9 % (FLUSH) 0.9 %
10 SYRINGE (ML) INJECTION
Status: CANCELLED | OUTPATIENT
Start: 2025-06-16

## 2025-06-16 RX ADMIN — SODIUM CHLORIDE 1500 MG: 9 INJECTION, SOLUTION INTRAVENOUS at 02:06

## 2025-06-16 NOTE — DISCHARGE INSTRUCTIONS
.[facility]  [address] [address]  [address]  [phone] phone     [phone] fax  Hours of Operation: Monday- Friday 8:00am- 5:00pm  After hours phone  [phone]  Hematology / Oncology Physicians on call    Dr. Dean Lopes           Nurse Practitioners:     Camelia Turner, NAKIA Farrell, POLO Sterling, NAKIA Mehta, NAKIA Ceballos, NP    Please don't hesitate to call if you have any concerns.      FALL PREVENTION   Falls often occur due to slipping, tripping or losing your balance. Here are ways to reduce your risk of falling again.   Was there anything that caused your fall that can be fixed, removed or replaced?   Make your home safe by keeping walkways clear of objects you may trip over.   Use non-slip pads under rugs.   Do not walk in poorly lit areas.   Do not stand on chairs or wobbly ladders.   Use caution when reaching overhead or looking upward. This position can cause a loss of balance.   Be sure your shoes fit properly, have non-slip bottoms and are in good condition.   Be cautious when going up and down stairs, curbs, and when walking on uneven sidewalks.   If your balance is poor, consider using a cane or walker.   If your fall was related to alcohol use, stop or limit alcohol intake.   If your fall was related to use of sleeping medicines, talk to your doctor about this. You may need to reduce your dosage at bedtime if you awaken during the night to go to the bathroom.   To reduce the need for nighttime bathroom trips:   Avoid drinking fluids for several hours before going to bed   Empty your bladder before going to bed   Men can keep a urinal at the bedside   © 9333-6193 Lenora Grier, [address] [place] [address], [place] [address]. All rights reserved. This information is not intended as a substitute for professional medical care. Always follow your healthcare  professional's instructions.    WAYS TO HELP PREVENT INFECTION        WASH YOUR HANDS OFTEN DURING THE DAY, ESPECIALLY BEFORE YOU EAT, AFTER USING THE BATHROOM, AND AFTER TOUCHING ANIMALS    STAY AWAY FROM PEOPLE WHO HAVE ILLNESSES YOU CAN CATCH; SUCH AS COLDS, FLU, CHICKEN POX    TRY TO AVOID CROWDS    STAY AWAY FROM CHILDREN WHO RECENTLY HAVE RECEIVED LIVE VIRUS VACCINES    MAINTAIN GOOD MOUTH CARE    DO NOT SQUEEZE OR SCRATCH PIMPLES    CLEAN CUTS & SCRAPES RIGHT AWAY AND DAILY UNTIL HEALED WITH WARM WATER, SOAP & AN ANTISEPTIC    AVOID CONTACT WITH LITTER BOXES, BIRD CAGES, & FISH TANKS    AVOID STANDING WATER, IE., BIRD BATHS, FLOWER POTS/VASES, OR HUMIDIFIERS    WEAR GLOVES WHEN GARDENING OR CLEANING UP AFTER OTHERS, ESPECIALLY BABIES & SMALL CHILDREN    DO NOT EAT RAW FISH, SEAFOOD, MEAT, OR EGGS

## 2025-06-16 NOTE — PLAN OF CARE
Problem: Adult Inpatient Plan of Care  Goal: Plan of Care Review  Outcome: Progressing  Flowsheets (Taken 6/16/2025 1510)  Plan of Care Reviewed With: patient  Goal: Patient-Specific Goal (Individualized)  Outcome: Progressing  Flowsheets (Taken 6/16/2025 1510)  Individualized Care Needs: Reclined, warm blanket, pillow provided. Cheese-its and apple juice given per her request  Anxieties, Fears or Concerns: No concerns expressed  Patient/Family-Specific Goals (Include Timeframe): To tolerate infusion today     Problem: Fall Injury Risk  Goal: Absence of Fall and Fall-Related Injury  Outcome: Progressing  Intervention: Identify and Manage Contributors  Flowsheets (Taken 6/16/2025 1510)  Self-Care Promotion: BADL personal objects within reach  Medication Review/Management:   medications reviewed   infusion initiated  Intervention: Promote Injury-Free Environment  Flowsheets (Taken 6/16/2025 1510)  Safety Promotion/Fall Prevention:   high risk medications identified   in recliner, wheels locked   instructed to call staff for mobility   lighting adjusted   medications reviewed     Problem: Chemotherapy Effects  Goal: Nausea and Vomiting Relief  Outcome: Progressing  Intervention: Prevent or Manage Nausea and Vomiting  Flowsheets (Taken 6/16/2025 1510)  Environmental Support: calm environment promoted

## 2025-06-21 ENCOUNTER — PATIENT MESSAGE (OUTPATIENT)
Dept: INTERNAL MEDICINE | Facility: CLINIC | Age: 75
End: 2025-06-21
Payer: MEDICARE

## 2025-06-21 ENCOUNTER — PATIENT MESSAGE (OUTPATIENT)
Dept: HEMATOLOGY/ONCOLOGY | Facility: CLINIC | Age: 75
End: 2025-06-21
Payer: MEDICARE

## 2025-06-22 ENCOUNTER — PATIENT MESSAGE (OUTPATIENT)
Dept: ADMINISTRATIVE | Facility: OTHER | Age: 75
End: 2025-06-22
Payer: MEDICARE

## 2025-06-24 ENCOUNTER — OFFICE VISIT (OUTPATIENT)
Dept: INTERNAL MEDICINE | Facility: CLINIC | Age: 75
End: 2025-06-24
Payer: MEDICARE

## 2025-06-24 VITALS
BODY MASS INDEX: 25.68 KG/M2 | HEIGHT: 62 IN | OXYGEN SATURATION: 94 % | HEART RATE: 108 BPM | DIASTOLIC BLOOD PRESSURE: 60 MMHG | SYSTOLIC BLOOD PRESSURE: 108 MMHG | TEMPERATURE: 97 F | WEIGHT: 139.56 LBS

## 2025-06-24 DIAGNOSIS — F43.0 PANIC ATTACK AS REACTION TO STRESS: ICD-10-CM

## 2025-06-24 DIAGNOSIS — R05.3 PERSISTENT COUGH: ICD-10-CM

## 2025-06-24 DIAGNOSIS — F41.0 PANIC ATTACK AS REACTION TO STRESS: ICD-10-CM

## 2025-06-24 DIAGNOSIS — I10 PRIMARY HYPERTENSION: ICD-10-CM

## 2025-06-24 DIAGNOSIS — R05.1 ACUTE COUGH: Primary | ICD-10-CM

## 2025-06-24 DIAGNOSIS — C34.90 MALIGNANT NEOPLASM OF LUNG, UNSPECIFIED LATERALITY, UNSPECIFIED PART OF LUNG: Primary | ICD-10-CM

## 2025-06-24 DIAGNOSIS — G47.00 INSOMNIA, UNSPECIFIED TYPE: ICD-10-CM

## 2025-06-24 PROCEDURE — 3288F FALL RISK ASSESSMENT DOCD: CPT | Mod: CPTII,S$GLB,, | Performed by: FAMILY MEDICINE

## 2025-06-24 PROCEDURE — 99999 PR PBB SHADOW E&M-EST. PATIENT-LVL IV: CPT | Mod: PBBFAC,,, | Performed by: FAMILY MEDICINE

## 2025-06-24 PROCEDURE — 3078F DIAST BP <80 MM HG: CPT | Mod: CPTII,S$GLB,, | Performed by: FAMILY MEDICINE

## 2025-06-24 PROCEDURE — 99214 OFFICE O/P EST MOD 30 MIN: CPT | Mod: S$GLB,,, | Performed by: FAMILY MEDICINE

## 2025-06-24 PROCEDURE — 3074F SYST BP LT 130 MM HG: CPT | Mod: CPTII,S$GLB,, | Performed by: FAMILY MEDICINE

## 2025-06-24 PROCEDURE — 3044F HG A1C LEVEL LT 7.0%: CPT | Mod: CPTII,S$GLB,, | Performed by: FAMILY MEDICINE

## 2025-06-24 PROCEDURE — 1101F PT FALLS ASSESS-DOCD LE1/YR: CPT | Mod: CPTII,S$GLB,, | Performed by: FAMILY MEDICINE

## 2025-06-24 PROCEDURE — 1126F AMNT PAIN NOTED NONE PRSNT: CPT | Mod: CPTII,S$GLB,, | Performed by: FAMILY MEDICINE

## 2025-06-24 PROCEDURE — 1159F MED LIST DOCD IN RCRD: CPT | Mod: CPTII,S$GLB,, | Performed by: FAMILY MEDICINE

## 2025-06-24 RX ORDER — PROMETHAZINE HYDROCHLORIDE AND CODEINE PHOSPHATE 6.25; 1 MG/5ML; MG/5ML
5 SOLUTION ORAL EVERY 8 HOURS PRN
Qty: 473 ML | Refills: 0 | Status: SHIPPED | OUTPATIENT
Start: 2025-06-24

## 2025-06-24 RX ORDER — CODEINE PHOSPHATE AND GUAIFENESIN 10; 100 MG/5ML; MG/5ML
5 SOLUTION ORAL 3 TIMES DAILY PRN
Qty: 237 ML | Refills: 0 | Status: SHIPPED | OUTPATIENT
Start: 2025-06-24

## 2025-06-24 NOTE — PROGRESS NOTES
Patient ID: Cassandra Campos is a 74 y.o. female.    Chief Complaint: Follow-up    History of Present Illness    Ms. Campos presents today for persistent cough.    She reports severe nighttime cough causing significant distress with episodes so intense that they make her want to cry. The cough disrupts her sleep, causing her to wake up repeatedly during the night. She has had previous positive response to promethazine with codeine for cough, which does not cause significant sedation for her.    She has a known lung mass associated with cancer. CT chest on 6/13 demonstrated stable appearance of lung mass with no interval changes noted. She completed radiation treatment approximately 1-2 weeks prior to her last chemotherapy session on May 16th. She describes chemotherapy as having significant and unpredictable effects on her body, expressing difficulty with the treatment's impact.    She has pending cardiac and neurological tests ordered by her cardiologist to evaluate potential cellular damage from prior chemotherapy treatment. She anticipates undergoing these tests multiple times per week with Dr. Leong to assess the extent of potential chemotherapy-induced cellular destruction in brain and heart tissues.    She is currently taking only as needed amitriptyline, Ativan, and Lunesta. She reports not taking Lunesta nightly. She currently denies being on any pain medication.      ROS:  General: -fever, -chills, +fatigue, -weight gain, -weight loss, +waking at night coughing  Eyes: -vision changes, -redness, -discharge  ENT: -ear pain, -nasal congestion, -sore throat, +hearing loss  Cardiovascular: -chest pain, -palpitations, -lower extremity edema  Respiratory: +cough, -shortness of breath  Gastrointestinal: -abdominal pain, -nausea, -vomiting, -diarrhea, -constipation, -blood in stool  Genitourinary: -dysuria, -hematuria, -frequency  Musculoskeletal: -joint pain, -muscle pain  Skin: -rash, -lesion  Neurological:  -headache, -dizziness, -numbness, -tingling         Physical Exam    General: In no acute distress. Not ill-appearing or diaphoretic.  Neck: Normal thyroid. No cervical lymphadenopathy. 2+ carotid pulses.  Cardiovascular: Normal rate and regular  rhythm. No murmur heard. No gallop.  Pulmonary:  Frequent slight productive cough during exam.  She has some broncho sounds in the left lower lung fields.  Recent CT lung was reviewed  Abdominal: Soft. Nontender. Nondistended. No mass.  Musculoskeletal: No swelling. No tenderness. No deformity.  Neurological: Alert.  Psychiatric: Mood normal. Behavior normal.  Vitals: Blood pressure looks good.         Assessment & Plan    MALIGNANT NEOPLASM OF LUNG:  - CT of the chest on 6/13 shows a stable lung mass with no changes.  - Ms. Campos completed radiation therapy around May 15th, followed by chemotherapy on May 16th.  - The stable mass from previous cancer is likely contributing to the patient's persistent cough.  - Cardiologist has ordered tests to evaluate the impact of chemotherapy on brain and heart cells.    CHRONIC COUGH:  - Ms. Campos experiences severe coughing at night, causing significant sleep disturbances.  - Prescribed promethazine with codeine for cough management, providing a larger quantity compared to previous prescription given its prior efficacy and the patient's tolerance.  - Medication choice considered potential for sedation and current medication regimen.    FOLLOW-UP:  - Scheduled follow up in 3 months.              Follow up in about 3 months (around 9/24/2025).    This note was generated with the assistance of ambient listening technology. Verbal consent was obtained by the patient and accompanying visitor(s) for the recording of patient appointment to facilitate this note. I attest to having reviewed and edited the generated note for accuracy, though some syntax or spelling errors may persist. Please contact the author of this note for any  clarification.

## 2025-06-29 ENCOUNTER — PATIENT MESSAGE (OUTPATIENT)
Dept: INTERNAL MEDICINE | Facility: CLINIC | Age: 75
End: 2025-06-29
Payer: MEDICARE

## 2025-06-30 ENCOUNTER — PATIENT MESSAGE (OUTPATIENT)
Dept: ORTHOPEDICS | Facility: CLINIC | Age: 75
End: 2025-06-30
Payer: MEDICARE

## 2025-06-30 ENCOUNTER — PATIENT MESSAGE (OUTPATIENT)
Dept: SPORTS MEDICINE | Facility: CLINIC | Age: 75
End: 2025-06-30
Payer: MEDICARE

## 2025-06-30 ENCOUNTER — PATIENT MESSAGE (OUTPATIENT)
Dept: PAIN MEDICINE | Facility: CLINIC | Age: 75
End: 2025-06-30
Payer: MEDICARE

## 2025-07-01 ENCOUNTER — TELEPHONE (OUTPATIENT)
Dept: INTERNAL MEDICINE | Facility: CLINIC | Age: 75
End: 2025-07-01
Payer: MEDICARE

## 2025-07-01 DIAGNOSIS — C34.90 MALIGNANT NEOPLASM OF LUNG, UNSPECIFIED LATERALITY, UNSPECIFIED PART OF LUNG: Primary | ICD-10-CM

## 2025-07-01 DIAGNOSIS — R05.3 CHRONIC COUGH: ICD-10-CM

## 2025-07-01 RX ORDER — HYDROMORPHONE HYDROCHLORIDE 2 MG/1
2 TABLET ORAL 3 TIMES DAILY
Qty: 42 TABLET | Refills: 0 | Status: SHIPPED | OUTPATIENT
Start: 2025-07-01

## 2025-07-01 NOTE — TELEPHONE ENCOUNTER
Copied from CRM #[id number]. Topic: General Inquiry - Return Call  >> Jul 1, 2025  8:43 AM Loree wrote:  .Type:  Patient Returning Call    Who Called:Cassandra   Who Left Message for Patient:  Does the patient know what this is regarding?:missed call she received this morning  Would the patient rather a call back or a response via [facility]? Call back  Best Call Back Number:.[phone]   Additional Information:

## 2025-07-02 ENCOUNTER — OFFICE VISIT (OUTPATIENT)
Dept: ORTHOPEDICS | Facility: CLINIC | Age: 75
End: 2025-07-02
Payer: MEDICARE

## 2025-07-02 VITALS — HEIGHT: 62 IN | BODY MASS INDEX: 25.68 KG/M2 | WEIGHT: 139.56 LBS

## 2025-07-02 DIAGNOSIS — M65.30 TRIGGER FINGER, ACQUIRED: ICD-10-CM

## 2025-07-02 DIAGNOSIS — M77.11 LATERAL EPICONDYLITIS, RIGHT ELBOW: Primary | ICD-10-CM

## 2025-07-02 PROCEDURE — 99999 PR PBB SHADOW E&M-EST. PATIENT-LVL III: CPT | Mod: PBBFAC,,, | Performed by: ORTHOPAEDIC SURGERY

## 2025-07-02 RX ORDER — TRIAMCINOLONE ACETONIDE 40 MG/ML
40 INJECTION, SUSPENSION INTRA-ARTICULAR; INTRAMUSCULAR
Status: DISCONTINUED | OUTPATIENT
Start: 2025-07-02 | End: 2025-07-02 | Stop reason: HOSPADM

## 2025-07-02 RX ADMIN — TRIAMCINOLONE ACETONIDE 40 MG: 40 INJECTION, SUSPENSION INTRA-ARTICULAR; INTRAMUSCULAR at 01:07

## 2025-07-02 NOTE — PROGRESS NOTES
Subjective:     Patient ID: Cassandra Campos is a 74 y.o. female.    Chief Complaint: No chief complaint on file.      HPI:    Past Medical History:   Diagnosis Date    Arthritis     hands    Bilateral bunions     Borderline glaucoma     De Quervain's disease (radial styloid tenosynovitis)     Gastritis     upper GI 2017    Hydradenitis     Hyperlipidemia     Hypertension     Insomnia     Lung cancer     Migraines 2000    Nasal septum perforation     Obesity     Pneumonia     Restrictive airway disease     Sleep apnea     SVT (supraventricular tachycardia) 2013    Trigger finger     Type 2 diabetes mellitus      am 2024     Past Surgical History:   Procedure Laterality Date    AXILLARY HIDRADENITIS EXCISION Bilateral     BONE EXOSTOSIS EXCISION Right 2018    Procedure: EXCISION, EXOSTOSIS;  Surgeon: Jayro Pedraza Sr., MD;  Location: [facility];  Service: Orthopedics;  Laterality: Right;    BREAST BIOPSY Bilateral     both benign    BREAST LUMPECTOMY  1998    BREAST SURGERY  1998    BRONCHOSCOPY Bilateral 01/15/2025    Procedure: Bronchoscopy - insert lighted tube into airway to take a biopsy of lung;  Surgeon: Adrian Robin MD;  Location: [facility];  Service: Pulmonary;  Laterality: Bilateral;    CARPAL TUNNEL RELEASE      bilateral    CARPAL TUNNEL RELEASE Right 2024    Procedure: RELEASE, CARPAL TUNNEL;  Surgeon: Jaime Oswald MD;  Location: [facility] OR;  Service: Orthopedics;  Laterality: Right;    CARPAL TUNNEL RELEASE Left 2024    Procedure: RELEASE, CARPAL TUNNEL;  Surgeon: Jaime Oswald MD;  Location: [facility] OR;  Service: Orthopedics;  Laterality: Left;    CATARACT EXTRACTION Bilateral     OU     SECTION  1979    CHOLECYSTECTOMY  2014    COLONOSCOPY N/A 10/02/2020    Procedure: COLONOSCOPY;  Surgeon: Tushar Edwards MD;  Location: [facility];  Service: Endoscopy;  Laterality: N/A;    COLONOSCOPY W/ POLYPECTOMY  10/02/2020     Polyps x3, repeat 5 years; Tushar Edwards MD     CYST REMOVAL  07/2015    sebaceous cyst removed from face    DE QUERVAIN'S RELEASE Left 01/16/2020    Procedure: RELEASE, HAND, FOR DEQUERVAIN'S TENOSYNOVITIS;  Surgeon: Jaime Oswald MD;  Location: [facility];  Service: Orthopedics;  Laterality: Left;    DE QUERVAIN'S RELEASE Right 11/20/2020    Procedure: RELEASE, HAND, FOR DEQUERVAIN'S TENOSYNOVITIS;  Surgeon: Jaime Oswald MD;  Location: [facility] OR;  Service: Orthopedics;  Laterality: Right;    ENDOBRONCHIAL ULTRASOUND Bilateral 04/10/2024    Procedure: ENDOBRONCHIAL ULTRASOUND (EBUS);  Surgeon: Adrian Robin MD;  Location: [facility];  Service: Pulmonary;  Laterality: Bilateral;    ENDOBRONCHIAL ULTRASOUND Bilateral 01/15/2025    Procedure: ENDOBRONCHIAL ULTRASOUND (EBUS);  Surgeon: Adrian Robin MD;  Location: [facility];  Service: Pulmonary;  Laterality: Bilateral;    EYE SURGERY      gastric sleeve  02/13/2017    Dr. Watson    INJECTION OF ANESTHETIC AGENT AROUND MULTIPLE INTERCOSTAL NERVES  05/10/2024    Procedure: BLOCK, NERVE, INTERCOSTAL, 2 OR MORE;  Surgeon: Mike Nuñez MD;  Location: [facility];  Service: Cardiothoracic;;    KNEE SURGERY Right     OLECRANON BURSECTOMY Right 07/25/2018    Procedure: BURSECTOMY, OLECRANON;  Surgeon: Jayro Pedraza Sr., MD;  Location: [facility];  Service: Orthopedics;  Laterality: Right;    SURGICAL REMOVAL OF BUNION WITH OSTEOTOMY OF METATARSAL BONE Left 05/10/2019    Procedure: BUNIONECTOMY, WITH METATARSAL OSTEOTOMY;  Surgeon: Srinivasan Villanueva DPM;  Location: [facility] OR;  Service: Podiatry;  Laterality: Left;    SURGICAL REMOVAL OF BUNION WITH OSTEOTOMY OF METATARSAL BONE Right 06/28/2019    Procedure: BUNIONECTOMY, WITH METATARSAL OSTEOTOMY;  Surgeon: Srinivasan Villanueva DPM;  Location: [facility];  Service: Podiatry;  Laterality: Right;    SURGICAL REMOVAL OF LYMPH NODE Left 05/10/2024    Procedure: EXCISION, LYMPH NODE;  Surgeon: Mike Nuñez  MD;  Location: [facility] OR;  Service: Cardiothoracic;  Laterality: Left;    TONSILLECTOMY, ADENOIDECTOMY  1980s    TRANSESOPHAGEAL ECHOCARDIOGRAM WITH POSSIBLE CARDIOVERSION (LAKESHIA W/ POSS CARDIOVERSION) N/A 05/15/2024    Procedure: Transesophageal echo (LAKESHIA) intra-procedure log documentation;  Surgeon: Twan Pelaez MD;  Location: [facility] CATH LAB;  Service: Cardiology;  Laterality: N/A;    TRIGGER FINGER RELEASE Right 04/01/2015    Dr. Milo HALL ROBOTIC RATS,WITH LOBECTOMY,LUNG Left 05/10/2024    Procedure: XI ROBOTIC RATS,WITH LOBECTOMY,LUNG;  Surgeon: Mike Nuñez MD;  Location: [facility] OR;  Service: Cardiothoracic;  Laterality: Left;  Lingulectomy     Family History   Problem Relation Name Age of Onset    Prostate cancer Brother      Diabetes Maternal Aunt Alondra Campos     Diabetes Cousin      Hypertension Maternal Grandmother Portia Orosco      Social History[1]  Medication List with Changes/Refills   Current Medications    ALBUTEROL (PROVENTIL/VENTOLIN HFA) 90 MCG/ACTUATION INHALER    Inhale 2 puffs into the lungs every 6 (six) hours as needed for Wheezing. Rescue    ALBUTEROL-IPRATROPIUM (DUO-NEB) 2.5 MG-0.5 MG/3 ML NEBULIZER SOLUTION    Take 3 mLs by nebulization 2 (two) times a day. Rescue    AMITRIPTYLINE (ELAVIL) 25 MG TABLET    Take 1 tablet (25 mg total) by mouth every evening.    ASPIRIN (ECOTRIN) 81 MG EC TABLET    Take 1 tablet (81 mg total) by mouth once daily.    BLOOD SUGAR DIAGNOSTIC STRP    use to test blood sugar 1-2 times a day    BLOOD-GLUCOSE METER (ACCU-CHEK GUIDE ME GLUCOSE MTR) MISC    To check blood glucose 1-2 times daily, to use with insurance preferred meter    HYDROMORPHONE (DILAUDID) 2 MG TABLET    Take 1 tablet (2 mg total) by mouth 3 (three) times daily. Do not use Lunesta or lorazepam with this medication    LANCETS MISC    Use to test blood glucose 1-2 times a day, discard lancet after each use    LOSARTAN (COZAAR) 25 MG TABLET    Take 1 tablet (25 mg total) by mouth once daily.     METHYLPREDNISOLONE (MEDROL DOSEPACK) 4 MG TABLET    Take by mouth once daily. use as directed per package instructions    OLANZAPINE (ZYPREXA) 5 MG TABLET    Take 1 tablet (5 mg total) by mouth every evening. On days 1-3 of each chemotherapy cycle.    OMEPRAZOLE (PRILOSEC) 20 MG CAPSULE    Take 1 capsule (20 mg total) by mouth once daily.    SEMAGLUTIDE (OZEMPIC) 2 MG/DOSE (8 MG/3 ML) PNIJ    Inject 2 mg into the skin every 7 days.    TAMSULOSIN (FLOMAX) 0.4 MG CAP    Take 1 capsule (0.4 mg total) by mouth once daily.     Review of patient's allergies indicates:  No Known Allergies  ROS    Objective:   There is no height or weight on file to calculate BMI.  There were no vitals filed for this visit.             Ortho/SPM Exam    Relevant imaging results reviewed and interpreted by me, discussed with the patient and / or family today ***  Assessment:     No diagnosis found.     Plan:                     Disclaimer: This note was prepared using a voice recognition system and is likely to have sound alike errors within the text.          [1]   Social History  Socioeconomic History    Marital status:     Number of children: 1   Occupational History    Occupation:  aid   Tobacco Use    Smoking status: Former     Current packs/day: 0.00     Average packs/day: 0.5 packs/day for 42.0 years (21.0 ttl pk-yrs)     Types: Cigarettes     Start date: 1970     Quit date: 2012     Years since quittin.5     Passive exposure: Past    Smokeless tobacco: Never   Substance and Sexual Activity    Alcohol use: Not Currently     Alcohol/week: 1.0 standard drink of alcohol     Types: 1 Glasses of wine per week     Comment: Glass red wine once a every 2 weeks    Drug use: Never    Sexual activity: Not Currently     Partners: Female     Birth control/protection: Abstinence, None   Social History Narrative    Single, part-time teacher. Masters degree biology.      Social Drivers of Health     Financial  Resource Strain: Patient Declined (4/3/2025)    Overall Financial Resource Strain (CARDIA)     Difficulty of Paying Living Expenses: Patient declined   Food Insecurity: Patient Declined (4/3/2025)    Hunger Vital Sign     Worried About Running Out of Food in the Last Year: Patient declined     Ran Out of Food in the Last Year: Patient declined   Transportation Needs: No Transportation Needs (4/2/2025)    PRAPARE - Transportation     Lack of Transportation (Medical): No     Lack of Transportation (Non-Medical): No   Physical Activity: Insufficiently Active (12/19/2024)    Exercise Vital Sign     Days of Exercise per Week: 3 days     Minutes of Exercise per Session: 20 min   Stress: Stress Concern Present (4/3/2025)    [employer] - Occupational Stress Questionnaire     Feeling of Stress : Very much   Housing Stability: High Risk (4/3/2025)    Housing Stability Vital Sign     Unable to Pay for Housing in the Last Year: Yes     Homeless in the Last Year: No

## 2025-07-02 NOTE — PROCEDURES
Tendon Sheath    Date/Time: 7/2/2025 1:00 PM    Performed by: Jaime Oswald MD  Authorized by: Jaime Oswald MD    Consent Done?:  Yes (Verbal)  Indications:  Pain  Timeout: prior to procedure the correct patient, procedure, and site was verified    Prep: patient was prepped and draped in usual sterile fashion      Local anesthesia used?: Yes    Local anesthetic:  Lidocaine 2% without epinephrine  Anesthetic total (ml):  0.5    Location:  Ring finger  Site:  R ring flexor tendon sheath  Ultrasonic guidance for needle placement?: No    Needle size:  25 G  Approach:  Volar  Medications:  40 mg triamcinolone acetonide 40 mg/mL

## 2025-07-02 NOTE — PROGRESS NOTES
Subjective:     Patient ID: Cassandra Campos is a 74 y.o. female.    Chief Complaint: Pain of the Right Hand      HPI:  The patient is a 74-year-old female with right elbow lateral epicondylitis and right ring trigger finger.  She requests injection today.    Past Medical History:   Diagnosis Date    Arthritis     hands    Bilateral bunions     Borderline glaucoma     De Quervain's disease (radial styloid tenosynovitis)     Gastritis     upper GI 2017    Hydradenitis     Hyperlipidemia     Hypertension     Insomnia     Lung cancer     Migraines 2000    Nasal septum perforation     Obesity     Pneumonia     Restrictive airway disease     Sleep apnea     SVT (supraventricular tachycardia) 2013    Trigger finger     Type 2 diabetes mellitus      am 2024     Past Surgical History:   Procedure Laterality Date    AXILLARY HIDRADENITIS EXCISION Bilateral     BONE EXOSTOSIS EXCISION Right 2018    Procedure: EXCISION, EXOSTOSIS;  Surgeon: Jayro Pedraza Sr., MD;  Location: [facility] OR;  Service: Orthopedics;  Laterality: Right;    BREAST BIOPSY Bilateral     both benign    BREAST LUMPECTOMY  1998    BREAST SURGERY  1998    BRONCHOSCOPY Bilateral 01/15/2025    Procedure: Bronchoscopy - insert lighted tube into airway to take a biopsy of lung;  Surgeon: Adrian Robin MD;  Location: [facility] ENDO;  Service: Pulmonary;  Laterality: Bilateral;    CARPAL TUNNEL RELEASE      bilateral    CARPAL TUNNEL RELEASE Right 2024    Procedure: RELEASE, CARPAL TUNNEL;  Surgeon: Jaime Oswald MD;  Location: [facility] OR;  Service: Orthopedics;  Laterality: Right;    CARPAL TUNNEL RELEASE Left 2024    Procedure: RELEASE, CARPAL TUNNEL;  Surgeon: Jaime Oswald MD;  Location: [facility] OR;  Service: Orthopedics;  Laterality: Left;    CATARACT EXTRACTION Bilateral     OU     SECTION  1979    CHOLECYSTECTOMY  2014    COLONOSCOPY N/A 10/02/2020     Procedure: COLONOSCOPY;  Surgeon: Tushar Edwards MD;  Location: [facility];  Service: Endoscopy;  Laterality: N/A;    COLONOSCOPY W/ POLYPECTOMY  10/02/2020    Polyps x3, repeat 5 years; Tushar Edwards MD     CYST REMOVAL  07/2015    sebaceous cyst removed from face    DE QUERVAIN'S RELEASE Left 01/16/2020    Procedure: RELEASE, HAND, FOR DEQUERVAIN'S TENOSYNOVITIS;  Surgeon: Jaime Oswald MD;  Location: [facility];  Service: Orthopedics;  Laterality: Left;    DE QUERVAIN'S RELEASE Right 11/20/2020    Procedure: RELEASE, HAND, FOR DEQUERVAIN'S TENOSYNOVITIS;  Surgeon: Jaime Oswald MD;  Location: [facility];  Service: Orthopedics;  Laterality: Right;    ENDOBRONCHIAL ULTRASOUND Bilateral 04/10/2024    Procedure: ENDOBRONCHIAL ULTRASOUND (EBUS);  Surgeon: Adrian Robin MD;  Location: [facility];  Service: Pulmonary;  Laterality: Bilateral;    ENDOBRONCHIAL ULTRASOUND Bilateral 01/15/2025    Procedure: ENDOBRONCHIAL ULTRASOUND (EBUS);  Surgeon: Adrian Robin MD;  Location: [facility];  Service: Pulmonary;  Laterality: Bilateral;    EYE SURGERY      gastric sleeve  02/13/2017    Dr. Watson    INJECTION OF ANESTHETIC AGENT AROUND MULTIPLE INTERCOSTAL NERVES  05/10/2024    Procedure: BLOCK, NERVE, INTERCOSTAL, 2 OR MORE;  Surgeon: Mike Nuñez MD;  Location: [facility];  Service: Cardiothoracic;;    KNEE SURGERY Right     OLECRANON BURSECTOMY Right 07/25/2018    Procedure: BURSECTOMY, OLECRANON;  Surgeon: Jayro Pedraza Sr., MD;  Location: [facility];  Service: Orthopedics;  Laterality: Right;    SURGICAL REMOVAL OF BUNION WITH OSTEOTOMY OF METATARSAL BONE Left 05/10/2019    Procedure: BUNIONECTOMY, WITH METATARSAL OSTEOTOMY;  Surgeon: Srinivasan Villanueva DPM;  Location: [facility];  Service: Podiatry;  Laterality: Left;    SURGICAL REMOVAL OF BUNION WITH OSTEOTOMY OF METATARSAL BONE Right 06/28/2019    Procedure: BUNIONECTOMY, WITH METATARSAL OSTEOTOMY;  Surgeon: Srinivasan Villanueva DPM;   Location: [facility] OR;  Service: Podiatry;  Laterality: Right;    SURGICAL REMOVAL OF LYMPH NODE Left 05/10/2024    Procedure: EXCISION, LYMPH NODE;  Surgeon: Mike Nuñez MD;  Location: [facility] OR;  Service: Cardiothoracic;  Laterality: Left;    TONSILLECTOMY, ADENOIDECTOMY  1980s    TRANSESOPHAGEAL ECHOCARDIOGRAM WITH POSSIBLE CARDIOVERSION (LAKESHIA W/ POSS CARDIOVERSION) N/A 05/15/2024    Procedure: Transesophageal echo (LAKESHIA) intra-procedure log documentation;  Surgeon: Twan Pelaez MD;  Location: [facility] CATH LAB;  Service: Cardiology;  Laterality: N/A;    TRIGGER FINGER RELEASE Right 04/01/2015    Dr. Milo HALL ROBOTIC RATS,WITH LOBECTOMY,LUNG Left 05/10/2024    Procedure: XI ROBOTIC RATS,WITH LOBECTOMY,LUNG;  Surgeon: Mike Nuñez MD;  Location: [facility] OR;  Service: Cardiothoracic;  Laterality: Left;  Lingulectomy     Family History   Problem Relation Name Age of Onset    Prostate cancer Brother      Diabetes Maternal Aunt Alondra Campos     Diabetes Cousin      Hypertension Maternal Grandmother Portia Orosco      Social History[1]  Medication List with Changes/Refills   Current Medications    ALBUTEROL (PROVENTIL/VENTOLIN HFA) 90 MCG/ACTUATION INHALER    Inhale 2 puffs into the lungs every 6 (six) hours as needed for Wheezing. Rescue    ALBUTEROL-IPRATROPIUM (DUO-NEB) 2.5 MG-0.5 MG/3 ML NEBULIZER SOLUTION    Take 3 mLs by nebulization 2 (two) times a day. Rescue    AMITRIPTYLINE (ELAVIL) 25 MG TABLET    Take 1 tablet (25 mg total) by mouth every evening.    ASPIRIN (ECOTRIN) 81 MG EC TABLET    Take 1 tablet (81 mg total) by mouth once daily.    BLOOD SUGAR DIAGNOSTIC STRP    use to test blood sugar 1-2 times a day    BLOOD-GLUCOSE METER (ACCU-CHEK GUIDE ME GLUCOSE MTR) MISC    To check blood glucose 1-2 times daily, to use with insurance preferred meter    HYDROMORPHONE (DILAUDID) 2 MG TABLET    Take 1 tablet (2 mg total) by mouth 3 (three) times daily. Do not use Lunesta or lorazepam with this medication     LANCETS MISC    Use to test blood glucose 1-2 times a day, discard lancet after each use    LOSARTAN (COZAAR) 25 MG TABLET    Take 1 tablet (25 mg total) by mouth once daily.    METHYLPREDNISOLONE (MEDROL DOSEPACK) 4 MG TABLET    Take by mouth once daily. use as directed per package instructions    OLANZAPINE (ZYPREXA) 5 MG TABLET    Take 1 tablet (5 mg total) by mouth every evening. On days 1-3 of each chemotherapy cycle.    OMEPRAZOLE (PRILOSEC) 20 MG CAPSULE    Take 1 capsule (20 mg total) by mouth once daily.    SEMAGLUTIDE (OZEMPIC) 2 MG/DOSE (8 MG/3 ML) PNIJ    Inject 2 mg into the skin every 7 days.    TAMSULOSIN (FLOMAX) 0.4 MG CAP    Take 1 capsule (0.4 mg total) by mouth once daily.     Review of patient's allergies indicates:  No Known Allergies  Review of Systems   Constitutional: Negative for malaise/fatigue.   HENT:  Negative for hearing loss.    Eyes:  Negative for double vision and visual disturbance.   Cardiovascular:  Positive for irregular heartbeat. Negative for chest pain.   Respiratory:  Negative for shortness of breath.    Endocrine: Negative for cold intolerance.   Hematologic/Lymphatic: Does not bruise/bleed easily.   Skin:  Negative for poor wound healing and suspicious lesions.   Musculoskeletal:  Positive for arthritis, falls, joint pain and joint swelling. Negative for gout.   Gastrointestinal:  Positive for heartburn. Negative for nausea and vomiting.   Genitourinary:  Positive for pelvic pain. Negative for dysuria.   Neurological:  Positive for numbness, paresthesias and sensory change.   Psychiatric/Behavioral:  Positive for depression. Negative for memory loss and substance abuse. The patient has insomnia and is nervous/anxious.    Allergic/Immunologic: Negative for persistent infections.       Objective:   Body mass index is 25.52 kg/m².  There were no vitals filed for this visit.             General    Constitutional: She is oriented to person, place, and time. She appears  well-developed and well-nourished. No distress.   HENT:   Head: Normocephalic.   Eyes: EOM are normal.   Pulmonary/Chest: Effort normal.   Neurological: She is oriented to person, place, and time.   Psychiatric: She has a normal mood and affect.             Right Hand/Wrist Exam     Inspection   Scars: Wrist - present Hand -  present  Effusion: Wrist - absent Hand -  absent    Pain   Hand - The patient exhibits pain of the ring MCP.  Wrist - The patient exhibits pain of the lateral epicondyle.    Other     Neuorologic Exam    Median Distribution: normal  Ulnar Distribution: normal  Radial Distribution: normal    Comments:  There is active triggering right ring finger with a palpable nodule that moves with tendon excursion.  There are well-healed carpal tunnel and de Quervain scars and right long trigger finger.  There is tenderness right elbow lateral epicondyle with pain on resisted wrist and finger extension.          Vascular Exam       Capillary Refill  Right Hand: normal capillary refill        Relevant imaging results reviewed and interpreted by me, discussed with the patient and / or family today radiographs right elbow were reviewed which showed a olecranon osteophyte.  Assessment:     Encounter Diagnoses   Name Primary?    Lateral epicondylitis, right elbow Yes    Trigger finger, acquired         Plan:     The patient is injected right elbow lateral epicondyle with 1 cc Kenalog and 1 cc 2% plain lidocaine under sterile technique and right ring trigger finger with 0.5 cc Kenalog and 0.5 cc 2% plain lidocaine under sterile technique.  She will wait at least 3 months between injections.                Disclaimer: This note was prepared using a voice recognition system and is likely to have sound alike errors within the text.            [1]  Social History  Socioeconomic History    Marital status:     Number of children: 1   Occupational History    Occupation:  aid   Tobacco Use     Smoking status: Former     Current packs/day: 0.00     Average packs/day: 0.5 packs/day for 42.0 years (21.0 ttl pk-yrs)     Types: Cigarettes     Start date: 1970     Quit date: 2012     Years since quittin.5     Passive exposure: Past    Smokeless tobacco: Never   Substance and Sexual Activity    Alcohol use: Not Currently     Alcohol/week: 1.0 standard drink of alcohol     Types: 1 Glasses of wine per week     Comment: Glass red wine once a every 2 weeks    Drug use: Never    Sexual activity: Not Currently     Partners: Female     Birth control/protection: Abstinence, None   Social History Narrative    Single, part-time teacher. Masters degree biology.      Social Drivers of Health     Financial Resource Strain: Patient Declined (4/3/2025)    Overall Financial Resource Strain (CARDIA)     Difficulty of Paying Living Expenses: Patient declined   Food Insecurity: Patient Declined (4/3/2025)    Hunger Vital Sign     Worried About Running Out of Food in the Last Year: Patient declined     Ran Out of Food in the Last Year: Patient declined   Transportation Needs: No Transportation Needs (2025)    PRAPARE - Transportation     Lack of Transportation (Medical): No     Lack of Transportation (Non-Medical): No   Physical Activity: Insufficiently Active (2024)    Exercise Vital Sign     Days of Exercise per Week: 3 days     Minutes of Exercise per Session: 20 min   Stress: Stress Concern Present (4/3/2025)    [employer] - Occupational Stress Questionnaire     Feeling of Stress : Very much   Housing Stability: High Risk (4/3/2025)    Housing Stability Vital Sign     Unable to Pay for Housing in the Last Year: Yes     Homeless in the Last Year: No

## 2025-07-02 NOTE — PROCEDURES
Tendon Origin: R elbow    Date/Time: 7/2/2025 1:00 PM    Performed by: Jaime Oswald MD  Authorized by: Jaime Oswald MD    Consent Done?:  Yes (Verbal)  Timeout: prior to procedure the correct patient, procedure, and site was verified    Timeout: prior to procedure the correct patient, procedure, and site was verified    Location:  Elbow  Site:  R elbow  Prep: patient was prepped and draped in usual sterile fashion    Needle size:  25 G  Approach:  Anterolateral  Medications:  40 mg triamcinolone acetonide 40 mg/mL

## 2025-07-03 NOTE — PROGRESS NOTES
Patient ID: Cassandra Campos  YOB: 1950  MRN: [id number]    Chief Complaint: No chief complaint on file.      Referred By: No ref. provider found     History of Present Illness: Cassandra Campos is a  74 y.o. female   Data Unavailable with a chief complaint of No chief complaint on file.    Presents today with bilateral knee pain. Rates pain at a 7/10. Pain has been present for bilateral knee pain and left shoulder pain .  Patient is a former patient of Dr. Naman Barton and was last seen by Dr. Barton on 5/22/2025. At this appointment, patient received a cortisone injection to the right knee. Patient has had bilateral cortisone injections on 2/12/2025.  Her worse pain today is along the medial side of the right knee and in her left shoulder.  She denies any injury or trauma        HPI    Past Medical History:   Past Medical History:   Diagnosis Date    Arthritis     hands    Bilateral bunions     Borderline glaucoma     De Quervain's disease (radial styloid tenosynovitis)     Gastritis     upper GI 2/2017    Hydradenitis     Hyperlipidemia     Hypertension     Insomnia     Lung cancer     Migraines 02/01/2000    Nasal septum perforation     Obesity     Pneumonia     Restrictive airway disease     Sleep apnea     SVT (supraventricular tachycardia) 09/2013    Trigger finger     Type 2 diabetes mellitus 2012     am 02/01/2024     Past Surgical History:   Procedure Laterality Date    AXILLARY HIDRADENITIS EXCISION Bilateral     BONE EXOSTOSIS EXCISION Right 07/25/2018    Procedure: EXCISION, EXOSTOSIS;  Surgeon: Jayro Pedraza Sr., MD;  Location: [facility] OR;  Service: Orthopedics;  Laterality: Right;    BREAST BIOPSY Bilateral     both benign    BREAST LUMPECTOMY  07/1998    BREAST SURGERY  07/1998    BRONCHOSCOPY Bilateral 01/15/2025    Procedure: Bronchoscopy - insert lighted tube into airway to take a biopsy of lung;  Surgeon: Adrian Robin MD;  Location: [facility] ENDO;  Service:  Pulmonary;  Laterality: Bilateral;    CARPAL TUNNEL RELEASE      bilateral    CARPAL TUNNEL RELEASE Right 2024    Procedure: RELEASE, CARPAL TUNNEL;  Surgeon: Jaime Oswald MD;  Location: [facility];  Service: Orthopedics;  Laterality: Right;    CARPAL TUNNEL RELEASE Left 2024    Procedure: RELEASE, CARPAL TUNNEL;  Surgeon: Jaime Oswald MD;  Location: [facility];  Service: Orthopedics;  Laterality: Left;    CATARACT EXTRACTION Bilateral     OU     SECTION  1979    CHOLECYSTECTOMY  2014    COLONOSCOPY N/A 10/02/2020    Procedure: COLONOSCOPY;  Surgeon: Tushar Edwards MD;  Location: [facility];  Service: Endoscopy;  Laterality: N/A;    COLONOSCOPY W/ POLYPECTOMY  10/02/2020    Polyps x3, repeat 5 years; Tushar Edwards MD     CYST REMOVAL  2015    sebaceous cyst removed from face    DE QUERVAIN'S RELEASE Left 2020    Procedure: RELEASE, HAND, FOR DEQUERVAIN'S TENOSYNOVITIS;  Surgeon: Jaime Oswald MD;  Location: [facility];  Service: Orthopedics;  Laterality: Left;    DE QUERVAIN'S RELEASE Right 2020    Procedure: RELEASE, HAND, FOR DEQUERVAIN'S TENOSYNOVITIS;  Surgeon: Jaime Oswald MD;  Location: [facility];  Service: Orthopedics;  Laterality: Right;    ENDOBRONCHIAL ULTRASOUND Bilateral 04/10/2024    Procedure: ENDOBRONCHIAL ULTRASOUND (EBUS);  Surgeon: Adrian Robin MD;  Location: [facility];  Service: Pulmonary;  Laterality: Bilateral;    ENDOBRONCHIAL ULTRASOUND Bilateral 01/15/2025    Procedure: ENDOBRONCHIAL ULTRASOUND (EBUS);  Surgeon: Adrian Robin MD;  Location: [facility];  Service: Pulmonary;  Laterality: Bilateral;    EYE SURGERY      gastric sleeve  2017    Dr. Watson    INJECTION OF ANESTHETIC AGENT AROUND MULTIPLE INTERCOSTAL NERVES  05/10/2024    Procedure: BLOCK, NERVE, INTERCOSTAL, 2 OR MORE;  Surgeon: Mike Nuñze MD;  Location: [facility];  Service: Cardiothoracic;;    KNEE SURGERY Right     OLECRANON BURSECTOMY  EMS Right 07/25/2018    Procedure: BURSECTOMY, OLECRANON;  Surgeon: Jayro Pedraza Sr., MD;  Location: [facility] OR;  Service: Orthopedics;  Laterality: Right;    SURGICAL REMOVAL OF BUNION WITH OSTEOTOMY OF METATARSAL BONE Left 05/10/2019    Procedure: BUNIONECTOMY, WITH METATARSAL OSTEOTOMY;  Surgeon: Srinivasan Villanueva DPM;  Location: [facility] OR;  Service: Podiatry;  Laterality: Left;    SURGICAL REMOVAL OF BUNION WITH OSTEOTOMY OF METATARSAL BONE Right 06/28/2019    Procedure: BUNIONECTOMY, WITH METATARSAL OSTEOTOMY;  Surgeon: Srinivasan Villanueva DPM;  Location: [facility] OR;  Service: Podiatry;  Laterality: Right;    SURGICAL REMOVAL OF LYMPH NODE Left 05/10/2024    Procedure: EXCISION, LYMPH NODE;  Surgeon: Mike Nuñez MD;  Location: [facility] OR;  Service: Cardiothoracic;  Laterality: Left;    TONSILLECTOMY, ADENOIDECTOMY  1980s    TRANSESOPHAGEAL ECHOCARDIOGRAM WITH POSSIBLE CARDIOVERSION (LAKESHIA W/ POSS CARDIOVERSION) N/A 05/15/2024    Procedure: Transesophageal echo (LAKESHIA) intra-procedure log documentation;  Surgeon: Twan Pelaez MD;  Location: [facility] CATH LAB;  Service: Cardiology;  Laterality: N/A;    TRIGGER FINGER RELEASE Right 04/01/2015    Dr. Milo HALL ROBOTIC RATS,WITH LOBECTOMY,LUNG Left 05/10/2024    Procedure: XI ROBOTIC RATS,WITH LOBECTOMY,LUNG;  Surgeon: Mike Nuñez MD;  Location: [facility] OR;  Service: Cardiothoracic;  Laterality: Left;  Lingulectomy     Family History   Problem Relation Name Age of Onset    Prostate cancer Brother      Diabetes Maternal Aunt Alondra Campos     Diabetes Cousin      Hypertension Maternal Grandmother Portia Orosco      Social History[1]  Medication List with Changes/Refills   Current Medications    ALBUTEROL (PROVENTIL/VENTOLIN HFA) 90 MCG/ACTUATION INHALER    Inhale 2 puffs into the lungs every 6 (six) hours as needed for Wheezing. Rescue    ALBUTEROL-IPRATROPIUM (DUO-NEB) 2.5 MG-0.5 MG/3 ML NEBULIZER SOLUTION    Take 3 mLs by nebulization 2 (two) times a day. Rescue    AMITRIPTYLINE  (ELAVIL) 25 MG TABLET    Take 1 tablet (25 mg total) by mouth every evening.    ASPIRIN (ECOTRIN) 81 MG EC TABLET    Take 1 tablet (81 mg total) by mouth once daily.    BLOOD SUGAR DIAGNOSTIC STRP    use to test blood sugar 1-2 times a day    BLOOD-GLUCOSE METER (ACCU-CHEK GUIDE ME GLUCOSE MTR) MISC    To check blood glucose 1-2 times daily, to use with insurance preferred meter    HYDROMORPHONE (DILAUDID) 2 MG TABLET    Take 1 tablet (2 mg total) by mouth 3 (three) times daily. Do not use Lunesta or lorazepam with this medication    LANCETS MISC    Use to test blood glucose 1-2 times a day, discard lancet after each use    LOSARTAN (COZAAR) 25 MG TABLET    Take 1 tablet (25 mg total) by mouth once daily.    METHYLPREDNISOLONE (MEDROL DOSEPACK) 4 MG TABLET    Take by mouth once daily. use as directed per package instructions    OLANZAPINE (ZYPREXA) 5 MG TABLET    Take 1 tablet (5 mg total) by mouth every evening. On days 1-3 of each chemotherapy cycle.    OMEPRAZOLE (PRILOSEC) 20 MG CAPSULE    Take 1 capsule (20 mg total) by mouth once daily.    SEMAGLUTIDE (OZEMPIC) 2 MG/DOSE (8 MG/3 ML) PNIJ    Inject 2 mg into the skin every 7 days.    TAMSULOSIN (FLOMAX) 0.4 MG CAP    Take 1 capsule (0.4 mg total) by mouth once daily.     Review of patient's allergies indicates:  No Known Allergies  Review of Systems   Constitutional: Negative for chills and decreased appetite.   HENT:  Negative for ear pain.    Eyes:  Negative for blurred vision.   Cardiovascular:  Negative for chest pain and claudication.   Respiratory:  Negative for hemoptysis.    Endocrine: Negative for cold intolerance.   Hematologic/Lymphatic: Does not bruise/bleed easily.   Skin:  Negative for dry skin.   Musculoskeletal:  Positive for joint pain.   Gastrointestinal:  Negative for abdominal pain.   Genitourinary:  Negative for flank pain.   Neurological:  Negative for brief paralysis.   Psychiatric/Behavioral:  Negative for depression.     Allergic/Immunologic: Negative for hives.       Physical Exam:   There is no height or weight on file to calculate BMI.  There were no vitals filed for this visit.   GENERAL: Well appearing, appropriate for stated age, no acute distress.  CARDIOVASCULAR: Pulses regular by peripheral palpation.  PULMONARY: Respirations are even and non-labored.  NEURO: Awake, alert, and oriented x 3.  PSYCH: Mood & affect are appropriate.  HEENT: Head is normocephalic and atraumatic.  Ortho/SPM Exam  Left shoulder  Skin is intact with no signs of erythema or infection  Range of motion is well-maintained  She has weakness with the external rotators in a painful impingement maneuver  Neurovascular exam of the extremities no  Right knee  No significant effusion  Motion as well maintained  She has tenderness along the medial joint line but no meniscal signs  Ligament exam is benign    Imaging:    CT Chest Without Contrast  Narrative: EXAM: CT CHEST WITHOUT CONTRAST    CLINICAL HISTORY: Cough    COMPARISON: 05/12/2025    TECHNIQUE: Standard thin-section axial images, with reformatted sagittal and coronal images.    FINDINGS: There is a stable appearance of focal lung consolidation or a pleural-based mass identified posteriorly in the left lower lobe measuring 14 x 11 mm (series 4, image 318).  Small patchy areas of lung consolidation in the superior segment of the left lower lobe and changes secondary to prior partial lung resection in the left upper lobe are also unchanged.  The right lung is clear.  No pleural effusions are appreciated. No pathologic lymph node enlargement is visible in the mediastinum or axilla bilaterally.  Heart size is normal. There is advanced vascular calcification is visible in the major coronary arteries. No chest wall abnormalities are identified.  Images of the upper abdomen demonstrate postsurgical changes including gastric sleeve procedure and cholecystectomy.  Impression: 1.  Stable appearance of a  pleural-based mass or focus of lung consolidation posteriorly in the left lower lobe compared to 05/12/2025.  2.  Stable patchy lung consolidation in the superior segment of the left lower lobe.  3.  Area of partial lung resection in the left upper lobe in the midlung zone.  4.  No adenopathy is identified in the chest.    All CT scans at this location are performed using dose modulation techniques as appropriate to a performed exam including the following: Automated exposure control; adjustment of the mA and/or kV according to patient size (this includes techniques or standardized protocols for targeted exams where dose is matched to indication / reason for exam; i.e. extremities or head); use of iterative reconstruction technique.    Finalized on: 6/13/2025 3:15 PM By:  Wade Wilkinson  [facility]# [id number]      2025-06-13 15:17:[id number]     [facility]      Relevant imaging results reviewed and interpreted by me, discussed with the patient and / or family today.  Both left shoulder and knee x-rays are fairly benign with no obvious fracture or severe degenerative changes    Other Tests:     None    There are no Patient Instructions on file for this visit.  Provider Note/Medical Decision Making:     Assessment: Cassandra Cmapos is a 74 y.o. female presenting with bilateral knee pain.  History, physical and radiographs are consistent with a likely diagnosis of right knee pain and left shoulder impingement.  Plan:  We will see how she does with corticosteroid injections in both these joints.  We gave her some exercises for the shoulder that she can do at home.  I answered all of her questions at length.   Follow up:  As needed. All questions answered  I discussed worrisome and red flag signs and symptoms with the patient. The patient expressed understanding and agreed to alert me immediately or to go to the emergency room if they experience any of these.   Treatment plan was developed with input from the patient/family, and  they expressed understanding and agreement with the plan. All questions were answered today.         Jarad Lorenz MD  Orthopaedic Surgery       Disclaimer: This note was prepared using a voice recognition system and is likely to have sound alike errors within the text.          [1]   Social History  Socioeconomic History    Marital status:     Number of children: 1   Occupational History    Occupation:  aid   Tobacco Use    Smoking status: Former     Current packs/day: 0.00     Average packs/day: 0.5 packs/day for 42.0 years (21.0 ttl pk-yrs)     Types: Cigarettes     Start date: 1970     Quit date: 2012     Years since quittin.5     Passive exposure: Past    Smokeless tobacco: Never   Substance and Sexual Activity    Alcohol use: Not Currently     Alcohol/week: 1.0 standard drink of alcohol     Types: 1 Glasses of wine per week     Comment: Glass red wine once a every 2 weeks    Drug use: Never    Sexual activity: Not Currently     Partners: Female     Birth control/protection: Abstinence, None   Social History Narrative    Single, part-time teacher. Masters degree biology.      Social Drivers of Health     Financial Resource Strain: Patient Declined (4/3/2025)    Overall Financial Resource Strain (CARDIA)     Difficulty of Paying Living Expenses: Patient declined   Food Insecurity: Patient Declined (4/3/2025)    Hunger Vital Sign     Worried About Running Out of Food in the Last Year: Patient declined     Ran Out of Food in the Last Year: Patient declined   Transportation Needs: No Transportation Needs (2025)    PRAPARE - Transportation     Lack of Transportation (Medical): No     Lack of Transportation (Non-Medical): No   Physical Activity: Insufficiently Active (2024)    Exercise Vital Sign     Days of Exercise per Week: 3 days     Minutes of Exercise per Session: 20 min   Stress: Stress Concern Present (4/3/2025)    [employer] [place] [employer]  - Occupational Stress Questionnaire     Feeling of Stress : Very much   Housing Stability: High Risk (4/3/2025)    Housing Stability Vital Sign     Unable to Pay for Housing in the Last Year: Yes     Homeless in the Last Year: No      Ambulance

## 2025-07-06 ENCOUNTER — PATIENT MESSAGE (OUTPATIENT)
Dept: INTERNAL MEDICINE | Facility: CLINIC | Age: 75
End: 2025-07-06
Payer: MEDICARE

## 2025-07-09 ENCOUNTER — HOSPITAL ENCOUNTER (OUTPATIENT)
Dept: RADIOLOGY | Facility: HOSPITAL | Age: 75
Discharge: HOME | End: 2025-07-09
Attending: FAMILY MEDICINE
Payer: MEDICARE

## 2025-07-09 ENCOUNTER — OFFICE VISIT (OUTPATIENT)
Dept: INTERNAL MEDICINE | Facility: CLINIC | Age: 75
End: 2025-07-09
Payer: MEDICARE

## 2025-07-09 ENCOUNTER — OFFICE VISIT (OUTPATIENT)
Dept: ORTHOPEDICS | Facility: CLINIC | Age: 75
End: 2025-07-09
Payer: MEDICARE

## 2025-07-09 VITALS
HEART RATE: 102 BPM | HEIGHT: 62 IN | SYSTOLIC BLOOD PRESSURE: 121 MMHG | HEIGHT: 62 IN | TEMPERATURE: 98 F | WEIGHT: 138.44 LBS | OXYGEN SATURATION: 96 % | BODY MASS INDEX: 25.4 KG/M2 | HEART RATE: 101 BPM | BODY MASS INDEX: 25.48 KG/M2 | WEIGHT: 138 LBS | DIASTOLIC BLOOD PRESSURE: 76 MMHG | SYSTOLIC BLOOD PRESSURE: 110 MMHG | DIASTOLIC BLOOD PRESSURE: 70 MMHG

## 2025-07-09 DIAGNOSIS — M17.0 PRIMARY OSTEOARTHRITIS OF BOTH KNEES: Primary | ICD-10-CM

## 2025-07-09 DIAGNOSIS — M25.512 CHRONIC LEFT SHOULDER PAIN: ICD-10-CM

## 2025-07-09 DIAGNOSIS — C34.90 MALIGNANT NEOPLASM OF LUNG, UNSPECIFIED LATERALITY, UNSPECIFIED PART OF LUNG: ICD-10-CM

## 2025-07-09 DIAGNOSIS — R05.2 SUBACUTE COUGH: Primary | ICD-10-CM

## 2025-07-09 DIAGNOSIS — G89.29 CHRONIC LEFT SHOULDER PAIN: ICD-10-CM

## 2025-07-09 DIAGNOSIS — R05.2 SUBACUTE COUGH: ICD-10-CM

## 2025-07-09 PROCEDURE — 1101F PT FALLS ASSESS-DOCD LE1/YR: CPT | Mod: CPTII,S$GLB,, | Performed by: FAMILY MEDICINE

## 2025-07-09 PROCEDURE — 3288F FALL RISK ASSESSMENT DOCD: CPT | Mod: CPTII,S$GLB,, | Performed by: FAMILY MEDICINE

## 2025-07-09 PROCEDURE — 3078F DIAST BP <80 MM HG: CPT | Mod: CPTII,S$GLB,, | Performed by: FAMILY MEDICINE

## 2025-07-09 PROCEDURE — 99999 PR PBB SHADOW E&M-EST. PATIENT-LVL IV: CPT | Mod: PBBFAC,,, | Performed by: FAMILY MEDICINE

## 2025-07-09 PROCEDURE — 3044F HG A1C LEVEL LT 7.0%: CPT | Mod: CPTII,S$GLB,, | Performed by: FAMILY MEDICINE

## 2025-07-09 PROCEDURE — 1126F AMNT PAIN NOTED NONE PRSNT: CPT | Mod: CPTII,S$GLB,, | Performed by: FAMILY MEDICINE

## 2025-07-09 PROCEDURE — 1159F MED LIST DOCD IN RCRD: CPT | Mod: CPTII,S$GLB,, | Performed by: FAMILY MEDICINE

## 2025-07-09 PROCEDURE — 3074F SYST BP LT 130 MM HG: CPT | Mod: CPTII,S$GLB,, | Performed by: FAMILY MEDICINE

## 2025-07-09 PROCEDURE — 71046 X-RAY EXAM CHEST 2 VIEWS: CPT | Mod: 26,,, | Performed by: RADIOLOGY

## 2025-07-09 PROCEDURE — 71046 X-RAY EXAM CHEST 2 VIEWS: CPT | Mod: TC

## 2025-07-09 PROCEDURE — 99999 PR PBB SHADOW E&M-EST. PATIENT-LVL IV: CPT | Mod: PBBFAC,,, | Performed by: ORTHOPAEDIC SURGERY

## 2025-07-09 PROCEDURE — 99214 OFFICE O/P EST MOD 30 MIN: CPT | Mod: S$GLB,,, | Performed by: FAMILY MEDICINE

## 2025-07-09 RX ORDER — TRIAMCINOLONE ACETONIDE 40 MG/ML
60 INJECTION, SUSPENSION INTRA-ARTICULAR; INTRAMUSCULAR
Status: DISCONTINUED | OUTPATIENT
Start: 2025-07-09 | End: 2025-07-09 | Stop reason: HOSPADM

## 2025-07-09 RX ORDER — LIDOCAINE HYDROCHLORIDE 10 MG/ML
3.5 INJECTION, SOLUTION INFILTRATION; PERINEURAL
Status: DISCONTINUED | OUTPATIENT
Start: 2025-07-09 | End: 2025-07-09 | Stop reason: HOSPADM

## 2025-07-09 RX ORDER — METOPROLOL SUCCINATE 50 MG/1
50 TABLET, EXTENDED RELEASE ORAL
COMMUNITY
Start: 2025-06-30

## 2025-07-09 RX ADMIN — TRIAMCINOLONE ACETONIDE 60 MG: 40 INJECTION, SUSPENSION INTRA-ARTICULAR; INTRAMUSCULAR at 10:07

## 2025-07-09 RX ADMIN — LIDOCAINE HYDROCHLORIDE 3.5 ML: 10 INJECTION, SOLUTION INFILTRATION; PERINEURAL at 10:07

## 2025-07-09 NOTE — PROGRESS NOTES
Patient ID: Cassandra Campos is a 74 y.o. female.    Chief Complaint: Follow-up    History of Present Illness    Ms. Campos presents today for persistent cough with mucus production.    She reports persistent cough with yellow sputum production and clear nasal discharge. She describes a sore throat and nocturnal coughing that is significantly impacting her sleep. She expresses desire for relief from nighttime cough symptoms.    She has lung cancer.  Recent CT from approximately three weeks ago demonstrated a stable pleural mass in the pleural lobe with no significant changes.  She was prescribed guaifenesin with codeine and Tessalon with no improvement of cough.  Recently prescribed Decadron 2 mg 3 times a day for cough.  She reports no improvement in cough.      ROS:  General: -fever, -chills, -fatigue, -weight gain, -weight loss  Eyes: -vision changes, -redness, -discharge  ENT: -ear pain, -nasal congestion, +sore throat, +nasal discharge, +runny nose  Cardiovascular: -chest pain, -palpitations, -lower extremity edema  Respiratory: +cough, -shortness of breath, +productive cough, +waking at night coughing  Gastrointestinal: -abdominal pain, -nausea, -vomiting, -diarrhea, -constipation, -blood in stool  Genitourinary: -dysuria, -hematuria, -frequency  Musculoskeletal: -joint pain, -muscle pain  Skin: -rash, -lesion  Neurological: -headache, -dizziness, -numbness, -tingling         Physical Exam    General: In no acute distress. Not ill-appearing or diaphoretic.  Neck: Normal thyroid. No cervical lymphadenopathy. 2+ carotid pulses.  Cardiovascular: Normal rate and regular  rhythm. No murmur heard. No gallop.  Pulmonary: Pulmonary effort is normal. No respiratory distress. No wheezing. No rhonchi. No rales.  Decreased left-sided breath sounds.  Abdominal: Soft. Nontender. Nondistended. No mass.  Musculoskeletal: No swelling. No tenderness. No deformity.  Neurological: Alert.  Psychiatric: Mood normal. Behavior  normal.         Assessment & Plan    CHRONIC COUGH:  - Evaluated patient's persistent cough with yellow mucus production, nighttime symptoms, sore throat, and clear nasal discharge despite previous treatments.  -  - Ordered chest XR to further evaluate the cause and referred patient to a pulmonologist for specialized assessment.      HISTORY OF LUNG CANCER:    ABNORMAL IMAGING FINDINGS:  - Reviewed CT from June 13th showing stable appearance of pleural mass with no significant changes.  - Ordered chest XR for further evaluation of the persistent cough.         Not sure if her cough is cancer related possibly secondary infection.  Will need pulmonary follow-up      No follow-ups on file.    This note was generated with the assistance of ambient listening technology. Verbal consent was obtained by the patient and accompanying visitor(s) for the recording of patient appointment to facilitate this note. I attest to having reviewed and edited the generated note for accuracy, though some syntax or spelling errors may persist. Please contact the author of this note for any clarification.

## 2025-07-09 NOTE — PROCEDURES
Large Joint Aspiration/Injection: R knee    Date/Time: 7/9/2025 10:00 AM    Performed by: Jarad Lorenz Jr., MD  Authorized by: Jarad Lorenz Jr., MD    Consent Done?:  Yes (Verbal)  Indications:  Pain  Timeout: prior to procedure the correct patient, procedure, and site was verified    Local anesthesia used?: No      Details:  Needle Size:  21 G  Ultrasonic Guidance for needle placement?: No    Approach:  Anterolateral  Location:  Knee  Site:  R knee  Medications:  3.5 mL LIDOcaine HCL 10 mg/ml (1%) 10 mg/mL (1 %); 60 mg triamcinolone acetonide 40 mg/mL  Patient tolerance:  Patient tolerated the procedure well with no immediate complications

## 2025-07-09 NOTE — PATIENT INSTRUCTIONS
Assessment:  Cassandra Campos is a 74 y.o. female   Chief Complaint   Patient presents with    Right Knee - Pain    Left Knee - Pain       No diagnosis found.     Plan:  Cortisone injections to left shoulder and right knee  Follow up as needed    Follow-up: as needed or sooner if there are any problems between now and then.    Thank you for choosing [facility] and Dr. Jarad Lorenz for your orthopedic care. It is our goal to provide you with exceptional care that will help keep you healthy, active, and get you back in the game.    Please do not hesitate to reach out to us via email, phone, or MyChart with any questions, concerns, or feedback.     If you are experiencing pain/discomfort ,or have questions and would like to be connected to the [facility] [place] on-call team, please call this number and specify which Orthopedic / Sports Medicine provider is treating you: [phone]

## 2025-07-09 NOTE — PROCEDURES
Large Joint Aspiration/Injection: L subacromial bursa    Date/Time: 7/9/2025 10:00 AM    Performed by: Jarad Lorenz Jr., MD  Authorized by: Jarad Lorenz Jr., MD    Consent Done?:  Yes (Verbal)  Indications:  Diagnostic evaluation and pain  Timeout: prior to procedure the correct patient, procedure, and site was verified      Details:  Needle Size:  21 G  Ultrasonic Guidance for needle placement?: No    Approach:  Posterior  Location:  Shoulder  Site:  L subacromial bursa  Medications:  3.5 mL LIDOcaine HCL 10 mg/ml (1%) 10 mg/mL (1 %); 60 mg triamcinolone acetonide 40 mg/mL  Patient tolerance:  Patient tolerated the procedure well with no immediate complications

## 2025-07-14 ENCOUNTER — LAB VISIT (OUTPATIENT)
Dept: LAB | Facility: HOSPITAL | Age: 75
End: 2025-07-14
Attending: INTERNAL MEDICINE
Payer: MEDICARE

## 2025-07-14 ENCOUNTER — INFUSION (OUTPATIENT)
Dept: INFUSION THERAPY | Facility: HOSPITAL | Age: 75
End: 2025-07-14
Attending: INTERNAL MEDICINE
Payer: MEDICARE

## 2025-07-14 ENCOUNTER — OFFICE VISIT (OUTPATIENT)
Dept: HEMATOLOGY/ONCOLOGY | Facility: CLINIC | Age: 75
End: 2025-07-14
Payer: MEDICARE

## 2025-07-14 VITALS
RESPIRATION RATE: 18 BRPM | BODY MASS INDEX: 25.68 KG/M2 | OXYGEN SATURATION: 95 % | TEMPERATURE: 97 F | SYSTOLIC BLOOD PRESSURE: 115 MMHG | HEIGHT: 62 IN | WEIGHT: 139.56 LBS | HEART RATE: 84 BPM | DIASTOLIC BLOOD PRESSURE: 66 MMHG

## 2025-07-14 VITALS
OXYGEN SATURATION: 95 % | BODY MASS INDEX: 25.68 KG/M2 | TEMPERATURE: 98 F | DIASTOLIC BLOOD PRESSURE: 69 MMHG | HEART RATE: 78 BPM | SYSTOLIC BLOOD PRESSURE: 115 MMHG | WEIGHT: 139.56 LBS | HEIGHT: 62 IN

## 2025-07-14 DIAGNOSIS — D84.821 IMMUNODEFICIENCY DUE TO CHEMOTHERAPY: ICD-10-CM
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
## 2025-07-14 DIAGNOSIS — D84.822 IMMUNODEFICIENCY SECONDARY TO RADIATION THERAPY: ICD-10-CM

## 2025-07-14 DIAGNOSIS — C34.32 MALIGNANT NEOPLASM OF LOWER LOBE OF LEFT LUNG: Primary | ICD-10-CM

## 2025-07-14 DIAGNOSIS — Z79.69 IMMUNODEFICIENCY DUE TO CHEMOTHERAPY: ICD-10-CM

## 2025-07-14 DIAGNOSIS — R94.6 ABNORMAL RESULTS OF THYROID FUNCTION STUDIES: ICD-10-CM

## 2025-07-14 DIAGNOSIS — C34.32 MALIGNANT NEOPLASM OF LOWER LOBE OF LEFT LUNG: ICD-10-CM

## 2025-07-14 DIAGNOSIS — E66.9 OBESITY, UNSPECIFIED CLASS, UNSPECIFIED OBESITY TYPE, UNSPECIFIED WHETHER SERIOUS COMORBIDITY PRESENT: Chronic | ICD-10-CM

## 2025-07-14 DIAGNOSIS — T45.1X5A IMMUNODEFICIENCY DUE TO CHEMOTHERAPY: ICD-10-CM

## 2025-07-14 DIAGNOSIS — R05.1 ACUTE COUGH: Primary | ICD-10-CM

## 2025-07-14 LAB
ABSOLUTE EOSINOPHIL (OHS): 0.02 K/UL
ABSOLUTE MONOCYTE (OHS): 0.59 K/UL (ref 0.3–1)
ABSOLUTE NEUTROPHIL COUNT (OHS): 3.69 K/UL (ref 1.8–7.7)
ALBUMIN SERPL BCP-MCNC: 3 G/DL (ref 3.5–5.2)
ALP SERPL-CCNC: 93 UNIT/L (ref 40–150)
ALT SERPL W/O P-5'-P-CCNC: 60 UNIT/L (ref 10–44)
ANION GAP (OHS): 5 MMOL/L (ref 8–16)
AST SERPL-CCNC: 38 UNIT/L (ref 11–45)
BASOPHILS # BLD AUTO: 0.01 K/UL
BASOPHILS NFR BLD AUTO: 0.2 %
BILIRUB SERPL-MCNC: 0.2 MG/DL (ref 0.1–1)
BUN SERPL-MCNC: 12 MG/DL (ref 8–23)
CALCIUM SERPL-MCNC: 9.6 MG/DL (ref 8.7–10.5)
CHLORIDE SERPL-SCNC: 105 MMOL/L (ref 95–110)
CO2 SERPL-SCNC: 28 MMOL/L (ref 23–29)
CREAT SERPL-MCNC: 0.6 MG/DL (ref 0.5–1.4)
ERYTHROCYTE [DISTWIDTH] IN BLOOD BY AUTOMATED COUNT: 13.7 % (ref 11.5–14.5)
GFR SERPLBLD CREATININE-BSD FMLA CKD-EPI: >60 ML/MIN/1.73/M2
GLUCOSE SERPL-MCNC: 98 MG/DL (ref 70–110)
HCT VFR BLD AUTO: 38.1 % (ref 37–48.5)
HGB BLD-MCNC: 12.6 GM/DL (ref 12–16)
IMM GRANULOCYTES # BLD AUTO: 0.02 K/UL (ref 0–0.04)
IMM GRANULOCYTES NFR BLD AUTO: 0.3 % (ref 0–0.5)
LYMPHOCYTES # BLD AUTO: 1.48 K/UL (ref 1–4.8)
MCH RBC QN AUTO: 25.9 PG (ref 27–31)
MCHC RBC AUTO-ENTMCNC: 33.1 G/DL (ref 32–36)
MCV RBC AUTO: 78 FL (ref 82–98)
NUCLEATED RBC (/100WBC) (OHS): 0 /100 WBC
PLATELET # BLD AUTO: 290 K/UL (ref 150–450)
PMV BLD AUTO: 8.9 FL (ref 9.2–12.9)
POTASSIUM SERPL-SCNC: 4 MMOL/L (ref 3.5–5.1)
PROT SERPL-MCNC: 8.1 GM/DL (ref 6–8.4)
RBC # BLD AUTO: 4.87 M/UL (ref 4–5.4)
RELATIVE EOSINOPHIL (OHS): 0.3 %
RELATIVE LYMPHOCYTE (OHS): 25.5 % (ref 18–48)
RELATIVE MONOCYTE (OHS): 10.2 % (ref 4–15)
RELATIVE NEUTROPHIL (OHS): 63.5 % (ref 38–73)
SODIUM SERPL-SCNC: 138 MMOL/L (ref 136–145)
WBC # BLD AUTO: 5.81 K/UL (ref 3.9–12.7)

## 2025-07-14 PROCEDURE — 99999 PR PBB SHADOW E&M-EST. PATIENT-LVL IV: CPT | Mod: PBBFAC,,, | Performed by: NURSE PRACTITIONER

## 2025-07-14 PROCEDURE — 1101F PT FALLS ASSESS-DOCD LE1/YR: CPT | Mod: CPTII,S$GLB,, | Performed by: NURSE PRACTITIONER

## 2025-07-14 PROCEDURE — 82247 BILIRUBIN TOTAL: CPT

## 2025-07-14 PROCEDURE — 99214 OFFICE O/P EST MOD 30 MIN: CPT | Mod: 25,S$GLB,, | Performed by: NURSE PRACTITIONER

## 2025-07-14 PROCEDURE — 96413 CHEMO IV INFUSION 1 HR: CPT

## 2025-07-14 PROCEDURE — 3078F DIAST BP <80 MM HG: CPT | Mod: CPTII,S$GLB,, | Performed by: NURSE PRACTITIONER

## 2025-07-14 PROCEDURE — 3074F SYST BP LT 130 MM HG: CPT | Mod: CPTII,S$GLB,, | Performed by: NURSE PRACTITIONER

## 2025-07-14 PROCEDURE — 84443 ASSAY THYROID STIM HORMONE: CPT

## 2025-07-14 PROCEDURE — 3044F HG A1C LEVEL LT 7.0%: CPT | Mod: CPTII,S$GLB,, | Performed by: NURSE PRACTITIONER

## 2025-07-14 PROCEDURE — 25000003 PHARM REV CODE 250: Performed by: NURSE PRACTITIONER

## 2025-07-14 PROCEDURE — 3288F FALL RISK ASSESSMENT DOCD: CPT | Mod: CPTII,S$GLB,, | Performed by: NURSE PRACTITIONER

## 2025-07-14 PROCEDURE — 36415 COLL VENOUS BLD VENIPUNCTURE: CPT

## 2025-07-14 PROCEDURE — 85025 COMPLETE CBC W/AUTO DIFF WBC: CPT

## 2025-07-14 PROCEDURE — 63600175 PHARM REV CODE 636 W HCPCS: Mod: JZ,TB | Performed by: NURSE PRACTITIONER

## 2025-07-14 PROCEDURE — 1126F AMNT PAIN NOTED NONE PRSNT: CPT | Mod: CPTII,S$GLB,, | Performed by: NURSE PRACTITIONER

## 2025-07-14 RX ORDER — GUAIFENESIN AND DEXTROMETHORPHAN HYDROBROMIDE 20; 400 MG/1; MG/1
1 TABLET ORAL EVERY 6 HOURS PRN
Qty: 30 EACH | Refills: 0 | Status: SHIPPED | OUTPATIENT
Start: 2025-07-14 | End: 2025-07-16

## 2025-07-14 RX ORDER — EPINEPHRINE 0.3 MG/.3ML
0.3 INJECTION SUBCUTANEOUS ONCE AS NEEDED
Status: CANCELLED | OUTPATIENT
Start: 2025-07-14

## 2025-07-14 RX ORDER — DIPHENHYDRAMINE HYDROCHLORIDE 50 MG/ML
50 INJECTION, SOLUTION INTRAMUSCULAR; INTRAVENOUS ONCE AS NEEDED
Status: CANCELLED | OUTPATIENT
Start: 2025-07-14

## 2025-07-14 RX ORDER — SODIUM CHLORIDE 0.9 % (FLUSH) 0.9 %
10 SYRINGE (ML) INJECTION
Status: CANCELLED | OUTPATIENT
Start: 2025-07-14

## 2025-07-14 RX ORDER — SODIUM CHLORIDE 0.9 % (FLUSH) 0.9 %
10 SYRINGE (ML) INJECTION
Status: DISCONTINUED | OUTPATIENT
Start: 2025-07-14 | End: 2025-07-14 | Stop reason: HOSPADM

## 2025-07-14 RX ORDER — HEPARIN 100 UNIT/ML
500 SYRINGE INTRAVENOUS
Status: CANCELLED | OUTPATIENT
Start: 2025-07-14

## 2025-07-14 RX ADMIN — SODIUM CHLORIDE 1500 MG: 9 INJECTION, SOLUTION INTRAVENOUS at 02:07

## 2025-07-14 NOTE — ASSESSMENT & PLAN NOTE
Cancer Staging   Malignant neoplasm of lower lobe of left lung  Staging form: Lung, AJCC 8th Edition  - Clinical stage from 6/28/2024: Stage IIB (cT1b, cN1, cM0) - Signed by Emerson Moran MD on 4/25/2025    Labs reviewed stable. She notes ongoing cough. Worse at night. Workup has been unremarkable. Requesting cough medicine with codeine. Will trial dextromethorphan.    Proceed with maintenance Imfinzi. F/u 4 weeks with repeat labs and Imfinzi planned

## 2025-07-14 NOTE — PLAN OF CARE
Problem: Adult Inpatient Plan of Care  Goal: Plan of Care Review  Outcome: Progressing  Flowsheets (Taken 7/14/2025 1410)  Plan of Care Reviewed With: patient  Goal: Patient-Specific Goal (Individualized)  Outcome: Progressing  Flowsheets (Taken 7/14/2025 1410)  Individualized Care Needs: Reclined, warm blanket, pillow under arm with IV. Cheese its and apple juice  Anxieties, Fears or Concerns: No concerns  Patient/Family-Specific Goals (Include Timeframe): Will tolerate infusion without event  Goal: Absence of Hospital-Acquired Illness or Injury  Outcome: Progressing  Goal: Optimal Comfort and Wellbeing  Outcome: Progressing  Intervention: Provide Person-Centered Care  Flowsheets (Taken 7/14/2025 1410)  Trust Relationship/Rapport:   care explained   choices provided   emotional support provided   empathic listening provided   questions answered   thoughts/feelings acknowledged   reassurance provided   questions encouraged  Goal: Readiness for Transition of Care  Outcome: Progressing     Problem: Infection  Goal: Absence of Infection Signs and Symptoms  Outcome: Progressing  Intervention: Prevent or Manage Infection  Flowsheets (Taken 7/14/2025 1410)  Infection Management: aseptic technique maintained

## 2025-07-14 NOTE — ASSESSMENT & PLAN NOTE
Imaging unremarkable. Ok to continue Immunotherapy. Trial Dextromethorphan. Recommend Pulmonology follow up

## 2025-07-14 NOTE — PROGRESS NOTES
Subjective:       Patient ID: Cassandra Campos is a 74 y.o. female.    Chief Complaint: Review labs. Immunotherapy      Cancer Staging   Malignant neoplasm of lower lobe of left lung  Staging form: Lung, AJCC 8th Edition  - Clinical stage from 6/28/2024: Stage IIB (cT1b, cN1, cM0) - Signed by Emerson Moran MD on 4/25/2025      HPI: 74 ylo female prsenting today for follow up of her lung cancer completed concurrent chemoradiation Carboplatin/Taxol + radiation. Now on maintenance Imfinzi Q 4 weeks.     She c/o ongoing cough. Workup has been unremarkable. She did note some improvement while taking steroids. Ongoing follow up with Pulmonology. Ok to continue immunotherapy per Primary Oncologist.  Social History[1]    Past Medical History:   Diagnosis Date    Arthritis     hands    Bilateral bunions     Borderline glaucoma     De Quervain's disease (radial styloid tenosynovitis)     Gastritis     upper GI 2/2017    Hydradenitis     Hyperlipidemia     Hypertension     Insomnia     Lung cancer     Migraines 02/01/2000    Nasal septum perforation     Obesity     Pneumonia     Restrictive airway disease     Sleep apnea     SVT (supraventricular tachycardia) 09/2013    Trigger finger     Type 2 diabetes mellitus 2012     am 02/01/2024       Family History   Problem Relation Name Age of Onset    Prostate cancer Brother      Diabetes Maternal Aunt Alondra Campos     Diabetes Cousin      Hypertension Maternal Grandmother Portia Orosco        Past Surgical History:   Procedure Laterality Date    AXILLARY HIDRADENITIS EXCISION Bilateral     BONE EXOSTOSIS EXCISION Right 07/25/2018    Procedure: EXCISION, EXOSTOSIS;  Surgeon: Jayro Pedraza Sr., MD;  Location: [facility];  Service: Orthopedics;  Laterality: Right;    BREAST BIOPSY Bilateral     both benign    BREAST LUMPECTOMY  07/1998    BREAST SURGERY  07/1998    BRONCHOSCOPY Bilateral 01/15/2025    Procedure: Bronchoscopy - insert lighted tube into airway to take a biopsy of  lung;  Surgeon: Adrian Robin MD;  Location: [facility];  Service: Pulmonary;  Laterality: Bilateral;    CARPAL TUNNEL RELEASE      bilateral    CARPAL TUNNEL RELEASE Right 2024    Procedure: RELEASE, CARPAL TUNNEL;  Surgeon: Jaime Oswald MD;  Location: [facility];  Service: Orthopedics;  Laterality: Right;    CARPAL TUNNEL RELEASE Left 2024    Procedure: RELEASE, CARPAL TUNNEL;  Surgeon: Jaime Oswald MD;  Location: [facility];  Service: Orthopedics;  Laterality: Left;    CATARACT EXTRACTION Bilateral     OU     SECTION  1979    CHOLECYSTECTOMY  2014    COLONOSCOPY N/A 10/02/2020    Procedure: COLONOSCOPY;  Surgeon: Tushar Edwards MD;  Location: [facility];  Service: Endoscopy;  Laterality: N/A;    COLONOSCOPY W/ POLYPECTOMY  10/02/2020    Polyps x3, repeat 5 years; Tushar Edwards MD     CYST REMOVAL  2015    sebaceous cyst removed from face    DE QUERVAIN'S RELEASE Left 2020    Procedure: RELEASE, HAND, FOR DEQUERVAIN'S TENOSYNOVITIS;  Surgeon: Jaime Oswald MD;  Location: [facility];  Service: Orthopedics;  Laterality: Left;    DE QUERVAIN'S RELEASE Right 2020    Procedure: RELEASE, HAND, FOR DEQUERVAIN'S TENOSYNOVITIS;  Surgeon: Jaime Oswald MD;  Location: [facility];  Service: Orthopedics;  Laterality: Right;    ENDOBRONCHIAL ULTRASOUND Bilateral 04/10/2024    Procedure: ENDOBRONCHIAL ULTRASOUND (EBUS);  Surgeon: Adrian Robin MD;  Location: [facility];  Service: Pulmonary;  Laterality: Bilateral;    ENDOBRONCHIAL ULTRASOUND Bilateral 01/15/2025    Procedure: ENDOBRONCHIAL ULTRASOUND (EBUS);  Surgeon: Adrian Robin MD;  Location: [facility];  Service: Pulmonary;  Laterality: Bilateral;    EYE SURGERY      gastric sleeve  2017    Dr. Watson    INJECTION OF ANESTHETIC AGENT AROUND MULTIPLE INTERCOSTAL NERVES  05/10/2024    Procedure: BLOCK, NERVE, INTERCOSTAL, 2 OR MORE;  Surgeon: Mike Nuñez MD;  Location: [facility];   Service: Cardiothoracic;;    KNEE SURGERY Right     OLECRANON BURSECTOMY Right 07/25/2018    Procedure: BURSECTOMY, OLECRANON;  Surgeon: Jayro Pedraza Sr., MD;  Location: [facility] OR;  Service: Orthopedics;  Laterality: Right;    SURGICAL REMOVAL OF BUNION WITH OSTEOTOMY OF METATARSAL BONE Left 05/10/2019    Procedure: BUNIONECTOMY, WITH METATARSAL OSTEOTOMY;  Surgeon: Srinivasan Villanueva DPM;  Location: [facility] OR;  Service: Podiatry;  Laterality: Left;    SURGICAL REMOVAL OF BUNION WITH OSTEOTOMY OF METATARSAL BONE Right 06/28/2019    Procedure: BUNIONECTOMY, WITH METATARSAL OSTEOTOMY;  Surgeon: Srinivasan Villanueva DPM;  Location: [facility] OR;  Service: Podiatry;  Laterality: Right;    SURGICAL REMOVAL OF LYMPH NODE Left 05/10/2024    Procedure: EXCISION, LYMPH NODE;  Surgeon: Mike Nuñez MD;  Location: [facility] OR;  Service: Cardiothoracic;  Laterality: Left;    TONSILLECTOMY, ADENOIDECTOMY  1980s    TRANSESOPHAGEAL ECHOCARDIOGRAM WITH POSSIBLE CARDIOVERSION (LAKESHIA W/ POSS CARDIOVERSION) N/A 05/15/2024    Procedure: Transesophageal echo (LAKESHIA) intra-procedure log documentation;  Surgeon: Twan Pelaez MD;  Location: [facility] CATH LAB;  Service: Cardiology;  Laterality: N/A;    TRIGGER FINGER RELEASE Right 04/01/2015    Dr. Pedraza    XI ROBOTIC RATS,WITH LOBECTOMY,LUNG Left 05/10/2024    Procedure: XI ROBOTIC RATS,WITH LOBECTOMY,LUNG;  Surgeon: Mike Nuñez MD;  Location: [facility] OR;  Service: Cardiothoracic;  Laterality: Left;  Lingulectomy       Review of Systems   Constitutional:  Negative for activity change, appetite change, chills, fatigue, fever and unexpected weight change.   HENT:  Negative for congestion, mouth sores, nosebleeds, sore throat, trouble swallowing and voice change.    Respiratory:  Positive for cough. Negative for chest tightness, shortness of breath and wheezing.    Cardiovascular:  Negative for chest pain and leg swelling.   Gastrointestinal:  Negative for abdominal distention, abdominal pain, blood in  stool, constipation, diarrhea, nausea and vomiting.   Genitourinary:  Negative for difficulty urinating, dysuria and hematuria.   Musculoskeletal:  Negative for arthralgias, back pain and myalgias.   Skin:  Negative for pallor, rash and wound.   Neurological:  Negative for dizziness, syncope, weakness and headaches.   Hematological:  Negative for adenopathy. Does not bruise/bleed easily.   Psychiatric/Behavioral:  The patient is not nervous/anxious.          Medication List with Changes/Refills   New Medications    DEXTROMETHORPHAN-GUAIFENESIN  MG TAB    Take 1 tablet by mouth every 6 (six) hours as needed (cough).   Current Medications    ALBUTEROL (PROVENTIL/VENTOLIN HFA) 90 MCG/ACTUATION INHALER    Inhale 2 puffs into the lungs every 6 (six) hours as needed for Wheezing. Rescue    ALBUTEROL-IPRATROPIUM (DUO-NEB) 2.5 MG-0.5 MG/3 ML NEBULIZER SOLUTION    Take 3 mLs by nebulization 2 (two) times a day. Rescue    AMITRIPTYLINE (ELAVIL) 25 MG TABLET    Take 1 tablet (25 mg total) by mouth every evening.    ASPIRIN (ECOTRIN) 81 MG EC TABLET    Take 1 tablet (81 mg total) by mouth once daily.    BLOOD SUGAR DIAGNOSTIC STRP    use to test blood sugar 1-2 times a day    BLOOD-GLUCOSE METER (ACCU-CHEK GUIDE ME GLUCOSE MTR) MISC    To check blood glucose 1-2 times daily, to use with insurance preferred meter    HYDROMORPHONE (DILAUDID) 2 MG TABLET    Take 1 tablet (2 mg total) by mouth 3 (three) times daily. Do not use Lunesta or lorazepam with this medication    LANCETS MISC    Use to test blood glucose 1-2 times a day, discard lancet after each use    LOSARTAN (COZAAR) 25 MG TABLET    Take 1 tablet (25 mg total) by mouth once daily.    METHYLPREDNISOLONE (MEDROL DOSEPACK) 4 MG TABLET    Take by mouth once daily. use as directed per package instructions    METOPROLOL SUCCINATE (TOPROL-XL) 50 MG 24 HR TABLET    Take 50 mg by mouth.    OLANZAPINE (ZYPREXA) 5 MG TABLET    Take 1 tablet (5 mg total) by mouth every  evening. On days 1-3 of each chemotherapy cycle.    OMEPRAZOLE (PRILOSEC) 20 MG CAPSULE    Take 1 capsule (20 mg total) by mouth once daily.    SEMAGLUTIDE (OZEMPIC) 2 MG/DOSE (8 MG/3 ML) PNIJ    Inject 2 mg into the skin every 7 days.    TAMSULOSIN (FLOMAX) 0.4 MG CAP    Take 1 capsule (0.4 mg total) by mouth once daily.     Objective:     Vitals:    07/14/25 1251   BP: 115/69   Pulse: 78   Temp: 97.6 °F (36.4 °C)     Lab Results   Component Value Date    WBC 5.81 07/14/2025    HGB 12.6 07/14/2025    HCT 38.1 07/14/2025    MCV 78 (L) 07/14/2025     07/14/2025       BMP  Lab Results   Component Value Date     07/14/2025    K 4.0 07/14/2025     07/14/2025    CO2 28 07/14/2025    BUN 12 07/14/2025    CREATININE 0.6 07/14/2025    CALCIUM 9.6 07/14/2025    ANIONGAP 5 (L) 07/14/2025    EGFRNORACEVR >60 07/14/2025     Lab Results   Component Value Date    ALT 60 (H) 07/14/2025    AST 38 07/14/2025    ALKPHOS 93 07/14/2025    BILITOT 0.2 07/14/2025       Physical Exam  Vitals reviewed.   Constitutional:       Appearance: She is well-developed.   HENT:      Head: Normocephalic.      Right Ear: External ear normal.      Left Ear: External ear normal.      Nose: Nose normal.   Eyes:      General: Lids are normal. No scleral icterus.        Right eye: No discharge.         Left eye: No discharge.      Conjunctiva/sclera: Conjunctivae normal.   Neck:      Thyroid: No thyroid mass.   Cardiovascular:      Rate and Rhythm: Normal rate and regular rhythm.      Heart sounds: Normal heart sounds.   Pulmonary:      Effort: Pulmonary effort is normal. No respiratory distress.      Breath sounds: Normal breath sounds. No stridor. No wheezing, rhonchi or rales.   Abdominal:      General: Bowel sounds are normal. There is no distension.   Genitourinary:     Comments: deferred  Musculoskeletal:         General: Normal range of motion.      Cervical back: Normal range of motion.   Skin:     General: Skin is warm and dry.    Neurological:      Mental Status: She is alert and oriented to person, place, and time.   Psychiatric:         Speech: Speech normal.         Behavior: Behavior normal. Behavior is cooperative.         Thought Content: Thought content normal.        Assessment:     Problem List Items Addressed This Visit          Pulmonary    Cough - Primary    Imaging unremarkable. Ok to continue Immunotherapy. Trial Dextromethorphan. Recommend Pulmonology follow up         Relevant Medications    dextromethorphan-guaiFENesin  mg Tab       Oncology    Malignant neoplasm of lower lobe of left lung     Cancer Staging   Malignant neoplasm of lower lobe of left lung  Staging form: Lung, AJCC 8th Edition  - Clinical stage from 6/28/2024: Stage IIB (cT1b, cN1, cM0) - Signed by Emerson Moran MD on 4/25/2025    Labs reviewed stable. She notes ongoing cough. Worse at night. Workup has been unremarkable. Requesting cough medicine with codeine. Will trial dextromethorphan.    Proceed with maintenance Imfinzi. F/u 4 weeks with repeat labs and Imfinzi planned            Endocrine    Obesity (Chronic)    Encourage healthy nutrition along with portion control. Encourage daily exercise              Plan:     Acute cough  -     dextromethorphan-guaiFENesin  mg Tab; Take 1 tablet by mouth every 6 (six) hours as needed (cough).  Dispense: 30 each; Refill: 0    Malignant neoplasm of lower lobe of left lung    Obesity, unspecified class, unspecified obesity type, unspecified whether serious comorbidity present    Other orders  -     Cancel: durvalumab (IMFINZI) 1,500 mg in 0.9% NaCl SolP 280 mL chemo infusion  -     EPINEPHrine (EPIPEN) 0.3 mg/0.3 mL pen injection 0.3 mg  -     diphenhydrAMINE injection 50 mg  -     hydrocortisone sodium succinate injection 100 mg  -     0.9% NaCl 100 mL flush bag  -     Cancel: sodium chloride 0.9% flush 10 mL  -     heparin, porcine (PF) 100 unit/mL injection flush 500 Units  -     alteplase  injection 2 mg            Med Onc Chart Routing      Follow up with physician 4 weeks. Dr. Moran   Follow up with [facility]    Infusion scheduling note   Imfinzi   Injection scheduling note    Labs CBC, CMP and TSH   Scheduling:  Preferred lab:  Lab interval:     Imaging None      Pharmacy appointment No pharmacy appointment needed      Other referrals No nutrition appointment needed -        No additional referrals needed         ANNIE Mcnulty             [1]   Social History  Socioeconomic History    Marital status:     Number of children: 1   Occupational History    Occupation:  aid   Tobacco Use    Smoking status: Former     Current packs/day: 0.00     Average packs/day: 0.5 packs/day for 42.0 years (21.0 ttl pk-yrs)     Types: Cigarettes     Start date: 1970     Quit date: 2012     Years since quittin.5     Passive exposure: Past    Smokeless tobacco: Never   Substance and Sexual Activity    Alcohol use: Not Currently     Alcohol/week: 1.0 standard drink of alcohol     Types: 1 Glasses of wine per week     Comment: Glass red wine once a every 2 weeks    Drug use: Never    Sexual activity: Not Currently     Partners: Female     Birth control/protection: Abstinence, None   Social History Narrative    Single, part-time teacher. Masters degree biology.      Social Drivers of Health     Financial Resource Strain: Patient Declined (4/3/2025)    Overall Financial Resource Strain (CARDIA)     Difficulty of Paying Living Expenses: Patient declined   Food Insecurity: Patient Declined (4/3/2025)    Hunger Vital Sign     Worried About Running Out of Food in the Last Year: Patient declined     Ran Out of Food in the Last Year: Patient declined   Transportation Needs: No Transportation Needs (2025)    PRAPARE - Transportation     Lack of Transportation (Medical): No     Lack of Transportation (Non-Medical): No   Physical Activity: Insufficiently Active (2024)    Exercise  Vital Sign     Days of Exercise per Week: 3 days     Minutes of Exercise per Session: 20 min   Stress: Stress Concern Present (4/3/2025)    [employer] - Occupational Stress Questionnaire     Feeling of Stress : Very much   Housing Stability: High Risk (4/3/2025)    Housing Stability Vital Sign     Unable to Pay for Housing in the Last Year: Yes     Homeless in the Last Year: No

## 2025-07-14 NOTE — DISCHARGE INSTRUCTIONS
[facility]  [address] [facility]  [address]  [phone] phone     [phone] fax  Hours of Operation: Monday- Friday 8:00am- 5:00pm  After hours phone  [phone]  Hematology / Oncology Physicians on call      VAUGHN Alonso Dr., NP Phaon Dunbar, NP Khelsea Conley, FNP    Please call with any concerns regarding your appointment today.

## 2025-07-15 LAB — TSH SERPL-ACNC: 3.54 UIU/ML (ref 0.4–4)

## 2025-07-16 ENCOUNTER — PATIENT MESSAGE (OUTPATIENT)
Dept: INTERNAL MEDICINE | Facility: CLINIC | Age: 75
End: 2025-07-16
Payer: MEDICARE

## 2025-07-16 DIAGNOSIS — R07.89 CHEST WALL PAIN FOLLOWING SURGERY: ICD-10-CM

## 2025-07-16 DIAGNOSIS — E11.9 CONTROLLED TYPE 2 DIABETES MELLITUS WITHOUT COMPLICATION, WITHOUT LONG-TERM CURRENT USE OF INSULIN: ICD-10-CM

## 2025-07-16 DIAGNOSIS — R05.2 SUBACUTE COUGH: Primary | ICD-10-CM

## 2025-07-16 DIAGNOSIS — E11.65 TYPE 2 DIABETES MELLITUS WITH HYPERGLYCEMIA, WITHOUT LONG-TERM CURRENT USE OF INSULIN: ICD-10-CM

## 2025-07-16 DIAGNOSIS — F32.0 MAJOR DEPRESSIVE DISORDER, SINGLE EPISODE, MILD: ICD-10-CM

## 2025-07-16 DIAGNOSIS — C34.32 MALIGNANT NEOPLASM OF LOWER LOBE OF LEFT LUNG: ICD-10-CM

## 2025-07-16 DIAGNOSIS — G89.18 CHEST WALL PAIN FOLLOWING SURGERY: ICD-10-CM

## 2025-07-16 RX ORDER — CODEINE PHOSPHATE AND GUAIFENESIN 10; 100 MG/5ML; MG/5ML
5 SOLUTION ORAL 3 TIMES DAILY
Qty: 473 ML | Refills: 0 | OUTPATIENT
Start: 2025-07-16

## 2025-07-16 RX ORDER — CODEINE PHOSPHATE AND GUAIFENESIN 10; 100 MG/5ML; MG/5ML
5 SOLUTION ORAL 3 TIMES DAILY PRN
Qty: 473 ML | Refills: 0 | Status: SHIPPED | OUTPATIENT
Start: 2025-07-16

## 2025-07-16 RX ORDER — SEMAGLUTIDE 2.68 MG/ML
2 INJECTION, SOLUTION SUBCUTANEOUS
Qty: 9 ML | Refills: 0 | Status: CANCELLED | OUTPATIENT
Start: 2025-07-16

## 2025-07-16 RX ORDER — AMITRIPTYLINE HYDROCHLORIDE 25 MG/1
25 TABLET, FILM COATED ORAL NIGHTLY
Qty: 90 TABLET | Refills: 1 | Status: CANCELLED | OUTPATIENT
Start: 2025-07-16

## 2025-07-16 RX ORDER — SEMAGLUTIDE 2.68 MG/ML
2 INJECTION, SOLUTION SUBCUTANEOUS
Qty: 9 ML | Refills: 0 | Status: SHIPPED | OUTPATIENT
Start: 2025-07-16

## 2025-07-16 NOTE — TELEPHONE ENCOUNTER
Refill Routing Note   Medication(s) are not appropriate for processing by [facility] for the following reason(s):        No active prescription written by provider    [facility] action(s):  Defer               Appointments  past 12m or future 3m with PCP    Date Provider   Last Visit   7/9/2025 Emil Zhang MD   Next Visit   9/30/2025 Emil Zhang MD   ED visits in past 90 days: 0        Note composed:1:00 PM 07/16/2025

## 2025-07-16 NOTE — TELEPHONE ENCOUNTER
No care due was identified.  Health [facility] Embedded Care Due Messages. Reference number: [id number].   7/16/2025 10:28:30 AM CDT

## 2025-07-16 NOTE — TELEPHONE ENCOUNTER
No care due was identified.  [facility] Embedded Care Due Messages. Reference number: [id number].   7/16/2025 10:50:53 AM CDT

## 2025-07-17 RX ORDER — AMITRIPTYLINE HYDROCHLORIDE 25 MG/1
25 TABLET, FILM COATED ORAL NIGHTLY
Qty: 90 TABLET | Refills: 3 | Status: SHIPPED | OUTPATIENT
Start: 2025-07-17

## 2025-07-22 ENCOUNTER — HOSPITAL ENCOUNTER (OUTPATIENT)
Dept: RADIOLOGY | Facility: HOSPITAL | Age: 75
Discharge: HOME | End: 2025-07-22
Attending: INTERNAL MEDICINE
Payer: MEDICARE

## 2025-07-22 ENCOUNTER — HOSPITAL ENCOUNTER (OUTPATIENT)
Dept: CARDIOLOGY | Facility: HOSPITAL | Age: 75
Discharge: HOME | End: 2025-07-22
Attending: INTERNAL MEDICINE
Payer: MEDICARE

## 2025-07-22 ENCOUNTER — HOSPITAL ENCOUNTER (OUTPATIENT)
Dept: PULMONOLOGY | Facility: HOSPITAL | Age: 75
Discharge: HOME | End: 2025-07-22
Attending: INTERNAL MEDICINE
Payer: MEDICARE

## 2025-07-22 VITALS
HEIGHT: 62 IN | HEART RATE: 80 BPM | WEIGHT: 139 LBS | DIASTOLIC BLOOD PRESSURE: 85 MMHG | BODY MASS INDEX: 25.58 KG/M2 | WEIGHT: 139 LBS | BODY MASS INDEX: 25.58 KG/M2 | SYSTOLIC BLOOD PRESSURE: 121 MMHG | DIASTOLIC BLOOD PRESSURE: 85 MMHG | HEART RATE: 82 BPM | SYSTOLIC BLOOD PRESSURE: 121 MMHG | HEIGHT: 62 IN

## 2025-07-22 DIAGNOSIS — I10 PRIMARY HYPERTENSION: ICD-10-CM

## 2025-07-22 DIAGNOSIS — C34.32 MALIGNANT NEOPLASM OF LOWER LOBE OF LEFT LUNG: ICD-10-CM

## 2025-07-22 DIAGNOSIS — R79.89 TROPONIN LEVEL ELEVATED: ICD-10-CM

## 2025-07-22 LAB
AORTIC ROOT ANNULUS: 2.8 CM
AORTIC SIZE INDEX: 1.7 CM/M2
ASCENDING AORTA: 2.8 CM
AV INDEX (PROSTH): 0.78
AV MEAN GRADIENT: 6 MMHG
AV PEAK GRADIENT: 9 MMHG
AV VALVE AREA BY VELOCITY RATIO: 2.5 CM²
AV VALVE AREA: 2.2 CM²
AV VELOCITY RATIO: 0.87
BSA FOR ECHO PROCEDURE: 1.66 M2
CV ECHO LV RWT: 0.58 CM
CV STRESS BASE HR: 80 BPM
DIASTOLIC BLOOD PRESSURE: 85 MMHG
DOP CALC AO PEAK VEL: 1.5 M/S
DOP CALC AO VTI: 27.5 CM
DOP CALC LVOT AREA: 2.8 CM2
DOP CALC LVOT DIAMETER: 1.9 CM
DOP CALC LVOT PEAK VEL: 1.3 M/S
DOP CALC LVOT STROKE VOLUME: 60.9 CM3
DOP CALC RVOT PEAK VEL: 0.82 M/S
DOP CALC RVOT VTI: 13.5 CM
DOP CALCLVOT PEAK VEL VTI: 21.5 CM
E WAVE DECELERATION TIME: 241 MSEC
E/A RATIO: 0.58
E/E' RATIO: 7 M/S
ECHO LV POSTERIOR WALL: 1.1 CM (ref 0.6–1.1)
EJECTION FRACTION- HIGH: 73 %
END DIASTOLIC INDEX-HIGH: 165 ML/M2
END DIASTOLIC INDEX-LOW: 101 ML/M2
END SYSTOLIC INDEX-HIGH: 64 ML/M2
END SYSTOLIC INDEX-LOW: 28 ML/M2
FRACTIONAL SHORTENING: 36.8 % (ref 28–44)
INTERVENTRICULAR SEPTUM: 1.2 CM (ref 0.6–1.1)
IVC DIAMETER: 1.29 CM
IVRT: 72 MSEC
LA MAJOR: 4.7 CM
LA MINOR: 5 CM
LA WIDTH: 2.9 CM
LEFT ATRIUM SIZE: 3.1 CM
LEFT ATRIUM VOLUME INDEX: 23 ML/M2
LEFT ATRIUM VOLUME: 37 CM3
LEFT INTERNAL DIMENSION IN SYSTOLE: 2.4 CM (ref 2.1–4)
LEFT VENTRICLE DIASTOLIC VOLUME INDEX: 37.8 ML/M2
LEFT VENTRICLE DIASTOLIC VOLUME: 62 ML
LEFT VENTRICLE MASS INDEX: 87.7 G/M2
LEFT VENTRICLE SYSTOLIC VOLUME INDEX: 12.2 ML/M2
LEFT VENTRICLE SYSTOLIC VOLUME: 20 ML
LEFT VENTRICULAR INTERNAL DIMENSION IN DIASTOLE: 3.8 CM (ref 3.5–6)
LEFT VENTRICULAR MASS: 143.8 G
LV LATERAL E/E' RATIO: 5.9 M/S
LV SEPTAL E/E' RATIO: 7.8 M/S
LVED V (TEICH): 61.71 ML
LVES V (TEICH): 20.45 ML
LVOT MG: 4.85 MMHG
LVOT MV: 1.09 CM/S
Lab: 1.8 CM/M
MV PEAK A VEL: 0.81 M/S
MV PEAK E VEL: 0.47 M/S
MV STENOSIS PRESSURE HALF TIME: 69.85 MS
MV VALVE AREA P 1/2 METHOD: 3.15 CM2
NUC REST EJECTION FRACTION: 80
NUC STRESS EJECTION FRACTION: 79 %
OHS CV CPX 85 PERCENT MAX PREDICTED HEART RATE MALE: 122
OHS CV CPX MAX PREDICTED HEART RATE: 144
OHS CV CPX PATIENT HEIGHT IN: 62
OHS CV CPX PATIENT HEIGHT IN: 62
OHS CV CPX PATIENT IS FEMALE: 1
OHS CV CPX PATIENT IS MALE: 0
OHS CV CPX PEAK DIASTOLIC BLOOD PRESSURE: 71 MMHG
OHS CV CPX PEAK HEAR RATE: 107 BPM
OHS CV CPX PEAK RATE PRESSURE PRODUCT: NORMAL
OHS CV CPX PEAK SYSTOLIC BLOOD PRESSURE: 134 MMHG
OHS CV CPX PERCENT MAX PREDICTED HEART RATE ACHIEVED: 77
OHS CV CPX RATE PRESSURE PRODUCT PRESENTING: 9680
OHS CV INITIAL DOSE: 9.7 MCG/KG/MIN
OHS CV PEAK DOSE: 30 MCG/KG/MIN
PISA TR MAX VEL: 2.5 M/S
PV MEAN GRADIENT: 2 MMHG
RA MAJOR: 3.86 CM
RA PRESSURE ESTIMATED: 3 MMHG
RA WIDTH: 2.7 CM
RETIRED EF AND QEF - SEE NOTES: 59 %
RV TB RVSP: 6 MMHG
STJ: 2.8 CM
SYSTOLIC BLOOD PRESSURE: 121 MMHG
TDI LATERAL: 0.08 M/S
TDI SEPTAL: 0.06 M/S
TDI: 0.07 M/S
TR MAX PG: 25 MMHG
TR MEAN GRADIENT: 20 MMHG
TRICUSPID ANNULAR PLANE SYSTOLIC EXCURSION: 2.2 CM
TV REST PULMONARY ARTERY PRESSURE: 28 MMHG
Z-SCORE OF LEFT VENTRICULAR DIMENSION IN END DIASTOLE: -1.94
Z-SCORE OF LEFT VENTRICULAR DIMENSION IN END SYSTOLE: -1.39

## 2025-07-22 PROCEDURE — 93306 TTE W/DOPPLER COMPLETE: CPT

## 2025-07-22 PROCEDURE — 63600175 PHARM REV CODE 636 W HCPCS: Performed by: INTERNAL MEDICINE

## 2025-07-22 PROCEDURE — 93018 CV STRESS TEST I&R ONLY: CPT | Mod: ,,, | Performed by: INTERNAL MEDICINE

## 2025-07-22 PROCEDURE — 78452 HT MUSCLE IMAGE SPECT MULT: CPT | Mod: 26,,, | Performed by: INTERNAL MEDICINE

## 2025-07-22 PROCEDURE — 93017 CV STRESS TEST TRACING ONLY: CPT

## 2025-07-22 PROCEDURE — 93306 TTE W/DOPPLER COMPLETE: CPT | Mod: 26,,, | Performed by: INTERNAL MEDICINE

## 2025-07-22 PROCEDURE — 93016 CV STRESS TEST SUPVJ ONLY: CPT | Mod: ,,, | Performed by: INTERNAL MEDICINE

## 2025-07-22 PROCEDURE — A9502 TC99M TETROFOSMIN: HCPCS | Performed by: INTERNAL MEDICINE

## 2025-07-22 RX ORDER — REGADENOSON 0.08 MG/ML
0.4 INJECTION, SOLUTION INTRAVENOUS ONCE
Status: COMPLETED | OUTPATIENT
Start: 2025-07-22 | End: 2025-07-22

## 2025-07-22 RX ORDER — REGADENOSON 0.08 MG/ML
0.4 INJECTION, SOLUTION INTRAVENOUS ONCE
Status: DISCONTINUED | OUTPATIENT
Start: 2025-07-22 | End: 2025-07-23 | Stop reason: HOSPADM

## 2025-07-22 RX ADMIN — TETROFOSMIN 9.7 MILLICURIE: 1.38 INJECTION, POWDER, LYOPHILIZED, FOR SOLUTION INTRAVENOUS at 09:07

## 2025-07-22 RX ADMIN — TETROFOSMIN 30 MILLICURIE: 1.38 INJECTION, POWDER, LYOPHILIZED, FOR SOLUTION INTRAVENOUS at 11:07

## 2025-07-22 RX ADMIN — REGADENOSON 0.4 MG: 0.08 INJECTION, SOLUTION INTRAVENOUS at 11:07

## 2025-07-23 ENCOUNTER — PATIENT MESSAGE (OUTPATIENT)
Dept: INTERNAL MEDICINE | Facility: CLINIC | Age: 75
End: 2025-07-23
Payer: MEDICARE

## 2025-07-23 DIAGNOSIS — F43.0 PANIC ATTACK AS REACTION TO STRESS: ICD-10-CM

## 2025-07-23 DIAGNOSIS — G47.00 INSOMNIA, UNSPECIFIED TYPE: ICD-10-CM

## 2025-07-23 DIAGNOSIS — F41.0 PANIC ATTACK AS REACTION TO STRESS: ICD-10-CM

## 2025-07-23 RX ORDER — ESZOPICLONE 3 MG/1
3 TABLET, FILM COATED ORAL NIGHTLY
Qty: 30 TABLET | Refills: 1 | Status: SHIPPED | OUTPATIENT
Start: 2025-07-23

## 2025-07-23 RX ORDER — LORAZEPAM 1 MG/1
1 TABLET ORAL 2 TIMES DAILY
Qty: 60 TABLET | Refills: 1 | Status: SHIPPED | OUTPATIENT
Start: 2025-07-23

## 2025-07-23 NOTE — TELEPHONE ENCOUNTER
No care due was identified.  [facility] Embedded Care Due Messages. Reference number: [id number].   7/23/2025 9:51:43 AM CDT

## 2025-07-24 ENCOUNTER — TELEPHONE (OUTPATIENT)
Dept: CARDIOLOGY | Facility: CLINIC | Age: 75
End: 2025-07-24
Payer: MEDICARE

## 2025-07-24 NOTE — TELEPHONE ENCOUNTER
The patient has been notified of he results and instructions were given              The Echo showed normal function and mild LVH and valvular regurgitation.  The nuclear stress test is normal. No ischemia  Continue current Rx.   F/U as scheduled

## 2025-07-24 NOTE — TELEPHONE ENCOUNTER
Spoke with pt in regards to test results. Pt verbalized understanding information without any concerns.                 Copied from CRM #[id number]. Topic: General Inquiry - Return Call  >> Jul 24, 2025  3:44 PM Benjamin wrote:  Type:  Patient Returning Call    Who Called:Cassandra Campos  Who Left Message for Patient:Rita  Does the patient know what this is regarding?:returning missed   Would the patient rather a call back or a response via [facility]? call  Best Call Back [phone]    Additional Information:

## 2025-07-24 NOTE — TELEPHONE ENCOUNTER
Attempted to reach the patient with results, lvm for the patient to call back          The Echo showed normal function and mild LVH and valvular regurgitation.  The nuclear stress test is normal. No ischemia  Continue current Rx.   F/U as scheduled

## 2025-07-25 ENCOUNTER — OFFICE VISIT (OUTPATIENT)
Dept: CARDIOLOGY | Facility: CLINIC | Age: 75
End: 2025-07-25
Payer: MEDICARE

## 2025-07-25 VITALS
HEIGHT: 62 IN | HEART RATE: 93 BPM | DIASTOLIC BLOOD PRESSURE: 78 MMHG | OXYGEN SATURATION: 95 % | SYSTOLIC BLOOD PRESSURE: 124 MMHG | BODY MASS INDEX: 26.09 KG/M2 | WEIGHT: 141.75 LBS

## 2025-07-25 DIAGNOSIS — I70.0 AORTIC ATHEROSCLEROSIS: ICD-10-CM

## 2025-07-25 DIAGNOSIS — R00.0 TACHYCARDIA: Primary | ICD-10-CM

## 2025-07-25 DIAGNOSIS — I48.3 TYPICAL ATRIAL FLUTTER: ICD-10-CM

## 2025-07-25 DIAGNOSIS — E78.5 HYPERLIPIDEMIA ASSOCIATED WITH TYPE 2 DIABETES MELLITUS: Chronic | ICD-10-CM

## 2025-07-25 DIAGNOSIS — E11.69 HYPERLIPIDEMIA ASSOCIATED WITH TYPE 2 DIABETES MELLITUS: Chronic | ICD-10-CM

## 2025-07-25 DIAGNOSIS — I70.0 CALCIFICATION OF AORTA: ICD-10-CM

## 2025-07-25 DIAGNOSIS — I10 PRIMARY HYPERTENSION: ICD-10-CM

## 2025-07-25 PROCEDURE — 3078F DIAST BP <80 MM HG: CPT | Mod: CPTII,S$GLB,, | Performed by: INTERNAL MEDICINE

## 2025-07-25 PROCEDURE — 99999 PR PBB SHADOW E&M-EST. PATIENT-LVL IV: CPT | Mod: PBBFAC,,, | Performed by: INTERNAL MEDICINE

## 2025-07-25 PROCEDURE — 3288F FALL RISK ASSESSMENT DOCD: CPT | Mod: CPTII,S$GLB,, | Performed by: INTERNAL MEDICINE

## 2025-07-25 PROCEDURE — 1126F AMNT PAIN NOTED NONE PRSNT: CPT | Mod: CPTII,S$GLB,, | Performed by: INTERNAL MEDICINE

## 2025-07-25 PROCEDURE — 1160F RVW MEDS BY RX/DR IN RCRD: CPT | Mod: CPTII,S$GLB,, | Performed by: INTERNAL MEDICINE

## 2025-07-25 PROCEDURE — 99214 OFFICE O/P EST MOD 30 MIN: CPT | Mod: S$GLB,,, | Performed by: INTERNAL MEDICINE

## 2025-07-25 PROCEDURE — 1159F MED LIST DOCD IN RCRD: CPT | Mod: CPTII,S$GLB,, | Performed by: INTERNAL MEDICINE

## 2025-07-25 PROCEDURE — 3074F SYST BP LT 130 MM HG: CPT | Mod: CPTII,S$GLB,, | Performed by: INTERNAL MEDICINE

## 2025-07-25 PROCEDURE — 1101F PT FALLS ASSESS-DOCD LE1/YR: CPT | Mod: CPTII,S$GLB,, | Performed by: INTERNAL MEDICINE

## 2025-07-25 RX ORDER — PANTOPRAZOLE SODIUM 40 MG/1
40 TABLET, DELAYED RELEASE ORAL DAILY
Qty: 90 TABLET | Refills: 3 | Status: SHIPPED | OUTPATIENT
Start: 2025-07-25 | End: 2026-07-25

## 2025-07-25 RX ORDER — LOSARTAN POTASSIUM 25 MG/1
25 TABLET ORAL DAILY
Qty: 90 TABLET | Refills: 2 | Status: SHIPPED | OUTPATIENT
Start: 2025-07-25

## 2025-07-25 NOTE — PROGRESS NOTES
Subjective:   Patient ID:  Cassandra Campos is a 75 y.o. female who presents for follow up of No chief complaint on file.      74 yo, female, came in for 6 weeks f/u  PMH PSVT, pAFL post op, HTN, HLP, NIDDM 10 yrs, obesity, ex smoker, DANIEL, not on CPAP. H/o lung Ca s/p left robotic lingual lobectomy and mediastinal lymph node dissection for a high-grade adenocarcinoma in 05/24.     No h/o MI CVA  Had bariatric surgery done in . Uncomplicated and last 30 pounds.  Walks 2 miles daily.   No smoking and occasional drink  Lives alone and no limitation of exercise  Patient feels OK, no chest pain, no sob, no palpitation, no dizziness, no syncope, no CNS symptoms.    04/24 visit  Plan robotic lobectomy for lung ca. Quit smoking 20 yrs ago.  Walks at home. No chest pain dizziness orthopnea . On inhaler as needed  EKG reviewed by myself today reveals NSR nonspecific STT change sinus tachy and LVH.  No change compared to prior one in 2020.    06/24 visit  S/p Left robotic lingual lobectomy and mediastinal lymph node dissection for a high-grade adenocarcinoma on 05/10/24  Post OP developed afl. And had LAKESHIA and DCCV converted to SR  TODAY EKG NSR  Oncology eval pending  No palpitation dizziness and chest pain   No orthopnea   and BP C    12/24 visit  Repeat vitals in 08/24 and no AFL. d/c eliquis.  Occasional dizziness. No faint chest pain palpitation  No coffee and energy drinking   and BP C    01/25 visit telenote  Plan bronch by Dr. Robin r/o ca  No chest pain dizziness orthopnea dyspnea    06/25 visit  Chest pain with cough   Wheezing f/u Dr. Robin    Interval history  NUKE stress showed no ischemia and TID 1.53  Echo EF nml  On SR  Remains dry cough started Ca and chemo.  Will switch to protonix from Prilosec  Chest ct showed diffuse 3 vessels coronary calcification, pt denied chest pain and SOB OE  Hold losartan due to dry cough                                             History of Present  Illness    CHIEF COMPLAINT:  - Cassandra presents for follow-up to discuss ongoing cough symptoms and recent cancer treatment.    HPI:  - Reports persistent cough for 1 year, described as both dry and occasionally productive  - Prescribed guaifenesin and weak codeine medication to manage cough; codeine provides some relief  - Cold-like symptoms, including cough, began with cancer diagnosis and subsequent treatment  - Declared cancer-free on May 16th  - Recently completed radiation therapy and chemotherapy  - Scheduled to see Dr. Daniels for follow-up care  - Reports mild wheezing  - Denies chest pain, dyspnea, and dyspnea with exertion  - Denies having had a stroke    CARDIAC HISTORY:  - US and echo: heart function in normal range, mild valve calcification, mild enlargement of the heart  - Nuclear scan: normal study    MEDICATIONS:  - Aspirin 81 mg daily  - Losartan 25 mg daily  - Metoprolol 50 mg daily  - Ozempic  - Guaifenesin for cough  - Weak codeine for cough    TEST RESULTS:  - Cholesterol: LDL 97  - Glucose: Controlled    MEDICAL HISTORY:  - Cancer: Diagnosed (now cancer-free)          Past Medical History:   Diagnosis Date    Arthritis     hands    Bilateral bunions     Borderline glaucoma     De Quervain's disease (radial styloid tenosynovitis)     Gastritis     upper GI 2/2017    Hydradenitis     Hyperlipidemia     Hypertension     Insomnia     Lung cancer     Migraines 02/01/2000    Nasal septum perforation     Obesity     Pneumonia     Restrictive airway disease     Sleep apnea     SVT (supraventricular tachycardia) 09/2013    Trigger finger     Type 2 diabetes mellitus 2012     am 02/01/2024       Past Surgical History:   Procedure Laterality Date    AXILLARY HIDRADENITIS EXCISION Bilateral     BONE EXOSTOSIS EXCISION Right 07/25/2018    Procedure: EXCISION, EXOSTOSIS;  Surgeon: Jayro Pedraza Sr., MD;  Location: [facility];  Service: Orthopedics;  Laterality: Right;    BREAST BIOPSY Bilateral      both benign    BREAST LUMPECTOMY  1998    BREAST SURGERY  1998    BRONCHOSCOPY Bilateral 01/15/2025    Procedure: Bronchoscopy - insert lighted tube into airway to take a biopsy of lung;  Surgeon: Adrian Robin MD;  Location: [facility];  Service: Pulmonary;  Laterality: Bilateral;    CARPAL TUNNEL RELEASE      bilateral    CARPAL TUNNEL RELEASE Right 2024    Procedure: RELEASE, CARPAL TUNNEL;  Surgeon: Jaime Oswald MD;  Location: [facility];  Service: Orthopedics;  Laterality: Right;    CARPAL TUNNEL RELEASE Left 2024    Procedure: RELEASE, CARPAL TUNNEL;  Surgeon: Jaime Oswald MD;  Location: [facility] OR;  Service: Orthopedics;  Laterality: Left;    CATARACT EXTRACTION Bilateral     OU     SECTION  1979    CHOLECYSTECTOMY  2014    COLONOSCOPY N/A 10/02/2020    Procedure: COLONOSCOPY;  Surgeon: Tushar Edwards MD;  Location: [facility];  Service: Endoscopy;  Laterality: N/A;    COLONOSCOPY W/ POLYPECTOMY  10/02/2020    Polyps x3, repeat 5 years; Tushar Edwards MD     CYST REMOVAL  2015    sebaceous cyst removed from face    DE QUERVAIN'S RELEASE Left 2020    Procedure: RELEASE, HAND, FOR DEQUERVAIN'S TENOSYNOVITIS;  Surgeon: Jaime Oswald MD;  Location: [facility] OR;  Service: Orthopedics;  Laterality: Left;    DE QUERVAIN'S RELEASE Right 2020    Procedure: RELEASE, HAND, FOR DEQUERVAIN'S TENOSYNOVITIS;  Surgeon: Jaime Oswald MD;  Location: [facility];  Service: Orthopedics;  Laterality: Right;    ENDOBRONCHIAL ULTRASOUND Bilateral 04/10/2024    Procedure: ENDOBRONCHIAL ULTRASOUND (EBUS);  Surgeon: Adrian Robin MD;  Location: [facility];  Service: Pulmonary;  Laterality: Bilateral;    ENDOBRONCHIAL ULTRASOUND Bilateral 01/15/2025    Procedure: ENDOBRONCHIAL ULTRASOUND (EBUS);  Surgeon: Adrian Robin MD;  Location: [facility];  Service: Pulmonary;  Laterality: Bilateral;    EYE SURGERY      gastric sleeve  2017    Dr. Watson     INJECTION OF ANESTHETIC AGENT AROUND MULTIPLE INTERCOSTAL NERVES  05/10/2024    Procedure: BLOCK, NERVE, INTERCOSTAL, 2 OR MORE;  Surgeon: Mike Nuñez MD;  Location: [facility] OR;  Service: Cardiothoracic;;    KNEE SURGERY Right     OLECRANON BURSECTOMY Right 07/25/2018    Procedure: BURSECTOMY, OLECRANON;  Surgeon: Jayro Pedraza Sr., MD;  Location: [facility] OR;  Service: Orthopedics;  Laterality: Right;    SURGICAL REMOVAL OF BUNION WITH OSTEOTOMY OF METATARSAL BONE Left 05/10/2019    Procedure: BUNIONECTOMY, WITH METATARSAL OSTEOTOMY;  Surgeon: Srinivasan Villanueva DPM;  Location: [facility] OR;  Service: Podiatry;  Laterality: Left;    SURGICAL REMOVAL OF BUNION WITH OSTEOTOMY OF METATARSAL BONE Right 06/28/2019    Procedure: BUNIONECTOMY, WITH METATARSAL OSTEOTOMY;  Surgeon: Srinivasan Villanueva DPM;  Location: [facility] OR;  Service: Podiatry;  Laterality: Right;    SURGICAL REMOVAL OF LYMPH NODE Left 05/10/2024    Procedure: EXCISION, LYMPH NODE;  Surgeon: Mike Nuñez MD;  Location: [facility] OR;  Service: Cardiothoracic;  Laterality: Left;    TONSILLECTOMY, ADENOIDECTOMY  1980s    TRANSESOPHAGEAL ECHOCARDIOGRAM WITH POSSIBLE CARDIOVERSION (LAKESHIA W/ POSS CARDIOVERSION) N/A 05/15/2024    Procedure: Transesophageal echo (LAKESHIA) intra-procedure log documentation;  Surgeon: Twan Pelaez MD;  Location: [facility] CATH LAB;  Service: Cardiology;  Laterality: N/A;    TRIGGER FINGER RELEASE Right 04/01/2015    Dr. Milo HALL ROBOTIC RATS,WITH LOBECTOMY,LUNG Left 05/10/2024    Procedure: XI ROBOTIC RATS,WITH LOBECTOMY,LUNG;  Surgeon: Mike Nuñez MD;  Location: [facility] OR;  Service: Cardiothoracic;  Laterality: Left;  Lingulectomy       Social History[1]    Family History   Problem Relation Name Age of Onset    Prostate cancer Brother      Diabetes Maternal Aunt Alondra Campos     Diabetes Cousin      Hypertension Maternal Grandmother Portia TSAI    Objective:   Physical Exam  HENT:      Head: Normocephalic.   Eyes:      Pupils:  Pupils are equal, round, and reactive to light.   Neck:      Thyroid: No thyromegaly.      Vascular: Normal carotid pulses. No carotid bruit or JVD.   Cardiovascular:      Rate and Rhythm: Normal rate and regular rhythm. No extrasystoles are present.     Chest Wall: PMI is not displaced.      Pulses: Normal pulses.      Heart sounds: Normal heart sounds. No murmur heard.     No gallop. No S3 sounds.   Pulmonary:      Effort: No respiratory distress.      Breath sounds: Normal breath sounds. No stridor.   Abdominal:      General: Bowel sounds are normal.      Palpations: Abdomen is soft.      Tenderness: There is no abdominal tenderness. There is no rebound.   Skin:     Findings: No rash.   Neurological:      Mental Status: She is alert and oriented to person, place, and time.   Psychiatric:         Behavior: Behavior normal.         Lab Results   Component Value Date    CHOL 157 04/25/2025    CHOL 165 04/16/2025    CHOL 184 12/26/2023     Lab Results   Component Value Date    HDL 40 04/25/2025    HDL 30 (L) 04/16/2025    HDL 48 12/26/2023     Lab Results   Component Value Date    LDLCALC 97.0 04/25/2025    LDLCALC 109.8 04/16/2025    LDLCALC 110.4 12/26/2023     Lab Results   Component Value Date    TRIG 100 04/25/2025    TRIG 126 04/16/2025    TRIG 128 12/26/2023     Lab Results   Component Value Date    CHOLHDL 25.5 04/25/2025    CHOLHDL 18.2 (L) 04/16/2025    CHOLHDL 26.1 12/26/2023       Chemistry        Component Value Date/Time     07/14/2025 1246     03/21/2025 1259    K 4.0 07/14/2025 1246    K 3.7 03/21/2025 1259     07/14/2025 1246     03/21/2025 1259    CO2 28 07/14/2025 1246    CO2 27 03/21/2025 1259    BUN 12 07/14/2025 1246    CREATININE 0.6 07/14/2025 1246    GLU 98 07/14/2025 1246    GLU 88 03/21/2025 1259        Component Value Date/Time    CALCIUM 9.6 07/14/2025 1246    CALCIUM 9.1 03/21/2025 1259    ALKPHOS 93 07/14/2025 1246    ALKPHOS 73 03/21/2025 1259    AST 38  07/14/2025 1246    AST 22 03/21/2025 1259    ALT 60 (H) 07/14/2025 1246    ALT 39 03/21/2025 1259    BILITOT 0.2 07/14/2025 1246    BILITOT 0.2 03/21/2025 1259    ESTGFRAFRICA >60 06/21/2022 0935    EGFRNONAA >60 06/21/2022 0935          Lab Results   Component Value Date    HGBA1C 5.4 04/25/2025     Lab Results   Component Value Date    TSH 3.537 07/14/2025     Lab Results   Component Value Date    INR 1.0 12/11/2024    INR 1.1 05/14/2024    INR 0.9 04/04/2024     Lab Results   Component Value Date    WBC 5.81 07/14/2025    HGB 12.6 07/14/2025    HCT 38.1 07/14/2025    MCV 78 (L) 07/14/2025     07/14/2025     BMP  Sodium   Date Value Ref Range Status   07/14/2025 138 136 - 145 mmol/L Final   03/21/2025 141 136 - 145 mmol/L Final     Potassium   Date Value Ref Range Status   07/14/2025 4.0 3.5 - 5.1 mmol/L Final   03/21/2025 3.7 3.5 - 5.1 mmol/L Final     Chloride   Date Value Ref Range Status   07/14/2025 105 95 - 110 mmol/L Final   03/21/2025 109 95 - 110 mmol/L Final     CO2   Date Value Ref Range Status   07/14/2025 28 23 - 29 mmol/L Final   03/21/2025 27 23 - 29 mmol/L Final     BUN   Date Value Ref Range Status   07/14/2025 12 8 - 23 mg/dL Final     Creatinine   Date Value Ref Range Status   07/14/2025 0.6 0.5 - 1.4 mg/dL Final     Calcium   Date Value Ref Range Status   07/14/2025 9.6 8.7 - 10.5 mg/dL Final   03/21/2025 9.1 8.7 - 10.5 mg/dL Final     Anion Gap   Date Value Ref Range Status   07/14/2025 5 (L) 8 - 16 mmol/L Final     eGFR if    Date Value Ref Range Status   06/21/2022 >60 >60 mL/min/1.73 m^2 Final     eGFR if non    Date Value Ref Range Status   06/21/2022 >60 >60 mL/min/1.73 m^2 Final     Comment:     Calculation used to obtain the estimated glomerular filtration  rate (eGFR) is the CKD-EPI equation.        BNP  @LABRCNTIP(BNP,BNPTRIAGEBLO)@  @LABRCNTIP(troponini)@  CrCl cannot be calculated (Patient's most recent lab result is older than the maximum 7  days allowed.).  No results found in the last 24 hours.  No results found in the last 24 hours.  No results found in the last 24 hours.    Assessment:      1. Tachycardia    2. Primary hypertension    3. Hyperlipidemia associated with type 2 diabetes mellitus    4. Aortic atherosclerosis    5. Calcification of aorta    6. Typical atrial flutter        Plan:     Assessment & Plan    IMPRESSION:  - Considered Losartan as potential cause of persistent cough, but decided against medication change due to cough's onset coinciding with cancer treatment.  - Overall cardiac status is stable.    AORTIC VALVE DISORDER AND CARDIOMEGALY:  - Continued Losartan 25 mg.  - Continued Metoprolol 50 mg.  - Continued Aspirin 81 mg.    CHRONIC COUGH:  - Started pertonic (likely meant Percocet or similar medication) for dry cough.    DIABETES MANAGEMENT:  - Continued Ozempic.    FOLLOW-UP:  - Follow up in 6 months.            This note was generated with the assistance of ambient listening technology. Verbal consent was obtained by the patient and accompanying visitor(s) for the recording of patient appointment to facilitate this note. I attest to having reviewed and edited the generated note for accuracy, though some syntax or spelling errors may persist. Please contact the author of this note for any clarification.            [1]   Social History  Tobacco Use    Smoking status: Former     Current packs/day: 0.00     Average packs/day: 0.5 packs/day for 42.0 years (21.0 ttl pk-yrs)     Types: Cigarettes     Start date: 1970     Quit date: 2012     Years since quittin.5     Passive exposure: Past    Smokeless tobacco: Never   Substance Use Topics    Alcohol use: Not Currently     Alcohol/week: 1.0 standard drink of alcohol     Types: 1 Glasses of wine per week     Comment: Glass red wine once a every 2 weeks    Drug use: Never

## 2025-08-03 ENCOUNTER — PATIENT MESSAGE (OUTPATIENT)
Dept: INTERNAL MEDICINE | Facility: CLINIC | Age: 75
End: 2025-08-03
Payer: MEDICARE

## 2025-08-03 DIAGNOSIS — R05.2 SUBACUTE COUGH: ICD-10-CM

## 2025-08-04 RX ORDER — CODEINE PHOSPHATE AND GUAIFENESIN 10; 100 MG/5ML; MG/5ML
5 SOLUTION ORAL 3 TIMES DAILY PRN
Qty: 473 ML | Refills: 0 | Status: SHIPPED | OUTPATIENT
Start: 2025-08-04

## 2025-08-05 ENCOUNTER — OFFICE VISIT (OUTPATIENT)
Dept: OBSTETRICS AND GYNECOLOGY | Facility: CLINIC | Age: 75
End: 2025-08-05
Payer: MEDICARE

## 2025-08-05 VITALS
BODY MASS INDEX: 25.97 KG/M2 | DIASTOLIC BLOOD PRESSURE: 66 MMHG | HEIGHT: 62 IN | WEIGHT: 141.13 LBS | SYSTOLIC BLOOD PRESSURE: 102 MMHG

## 2025-08-05 DIAGNOSIS — R10.2 PELVIC PAIN: Primary | ICD-10-CM

## 2025-08-05 PROCEDURE — 1126F AMNT PAIN NOTED NONE PRSNT: CPT | Mod: CPTII,S$GLB,,

## 2025-08-05 PROCEDURE — 1101F PT FALLS ASSESS-DOCD LE1/YR: CPT | Mod: CPTII,S$GLB,,

## 2025-08-05 PROCEDURE — 3074F SYST BP LT 130 MM HG: CPT | Mod: CPTII,S$GLB,,

## 2025-08-05 PROCEDURE — 99212 OFFICE O/P EST SF 10 MIN: CPT | Mod: S$GLB,,,

## 2025-08-05 PROCEDURE — 3078F DIAST BP <80 MM HG: CPT | Mod: CPTII,S$GLB,,

## 2025-08-05 PROCEDURE — 3288F FALL RISK ASSESSMENT DOCD: CPT | Mod: CPTII,S$GLB,,

## 2025-08-05 PROCEDURE — 1159F MED LIST DOCD IN RCRD: CPT | Mod: CPTII,S$GLB,,

## 2025-08-05 PROCEDURE — 99999 PR PBB SHADOW E&M-EST. PATIENT-LVL III: CPT | Mod: PBBFAC,,,

## 2025-08-05 NOTE — PROGRESS NOTES
Subjective:       Patient ID: Cassandra Campos is a 75 y.o. female.    Chief Complaint:  Pelvic Pain      History of Present Illness  HPI      Patient presents with complaints of lower abdominal cramping that lasted 2-3 days and then resolved on its on   Reports she has not had the pain in about 2 months  Does not recall any type of injury             GYN & OB History  No LMP recorded. Patient is postmenopausal.   Date of Last Pap: 2014    OB History    Para Term  AB Living   1 1 1      SAB IAB Ectopic Multiple Live Births             # Outcome Date GA Lbr Rex/2nd Weight Sex Type Anes PTL Lv   1 Term                Review of Systems  Review of Systems   Constitutional: Negative.    HENT: Negative.     Eyes: Negative.    Respiratory: Negative.     Cardiovascular: Negative.    Gastrointestinal: Negative.    Genitourinary:  Positive for pelvic pain (resolved).   Musculoskeletal: Negative.    Integumentary:  Negative.   Neurological: Negative.    Hematological: Negative.    Psychiatric/Behavioral: Negative.     All other systems reviewed and are negative.  Breast: negative.            Objective:      Physical Exam:   Constitutional: She is oriented to person, place, and time. She appears well-developed and well-nourished.    HENT:   Head: Normocephalic and atraumatic.    Eyes: Pupils are equal, round, and reactive to light. Conjunctivae and EOM are normal.     Cardiovascular:  Normal rate, regular rhythm and normal heart sounds.             Pulmonary/Chest: Effort normal.        Abdominal: Soft. There is no abdominal tenderness.     Genitourinary:    Genitourinary Comments: deferred             Musculoskeletal: Normal range of motion and moves all extremeties.       Neurological: She is alert and oriented to person, place, and time.    Skin: Skin is warm and dry. She is not diaphoretic.    Psychiatric: She has a normal mood and affect. Her behavior is normal. Judgment and thought content normal.              Assessment:        1. Pelvic pain               Plan:   Patient will continue to monitor for now, if pain returns will order pelvic US    Diagnosis and orders this visit:  Pelvic pain         Pilar Pool, NP

## 2025-08-11 ENCOUNTER — OFFICE VISIT (OUTPATIENT)
Dept: HEMATOLOGY/ONCOLOGY | Facility: CLINIC | Age: 75
End: 2025-08-11
Payer: MEDICARE

## 2025-08-11 ENCOUNTER — LAB VISIT (OUTPATIENT)
Dept: LAB | Facility: HOSPITAL | Age: 75
End: 2025-08-11
Attending: INTERNAL MEDICINE
Payer: MEDICARE

## 2025-08-11 ENCOUNTER — INFUSION (OUTPATIENT)
Dept: INFUSION THERAPY | Facility: HOSPITAL | Age: 75
End: 2025-08-11
Attending: INTERNAL MEDICINE
Payer: MEDICARE

## 2025-08-11 VITALS
BODY MASS INDEX: 26.05 KG/M2 | SYSTOLIC BLOOD PRESSURE: 125 MMHG | DIASTOLIC BLOOD PRESSURE: 67 MMHG | HEIGHT: 62 IN | HEART RATE: 90 BPM | RESPIRATION RATE: 16 BRPM | WEIGHT: 141.56 LBS | TEMPERATURE: 97 F | OXYGEN SATURATION: 98 %

## 2025-08-11 DIAGNOSIS — Z79.69 IMMUNODEFICIENCY DUE TO CHEMOTHERAPY: ICD-10-CM
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
## 2025-08-11 DIAGNOSIS — R94.6 ABNORMAL RESULTS OF THYROID FUNCTION STUDIES: ICD-10-CM

## 2025-08-11 DIAGNOSIS — T45.1X5A IMMUNODEFICIENCY DUE TO CHEMOTHERAPY: ICD-10-CM

## 2025-08-11 DIAGNOSIS — C34.32 MALIGNANT NEOPLASM OF LOWER LOBE OF LEFT LUNG: Primary | ICD-10-CM

## 2025-08-11 DIAGNOSIS — C34.32 MALIGNANT NEOPLASM OF LOWER LOBE OF LEFT LUNG: ICD-10-CM

## 2025-08-11 DIAGNOSIS — D84.821 IMMUNODEFICIENCY DUE TO CHEMOTHERAPY: ICD-10-CM

## 2025-08-11 DIAGNOSIS — D84.822 IMMUNODEFICIENCY SECONDARY TO RADIATION THERAPY: ICD-10-CM

## 2025-08-11 PROBLEM — C34.90 MALIGNANT NEOPLASM OF LUNG: Status: RESOLVED | Noted: 2025-06-24 | Resolved: 2025-08-11

## 2025-08-11 LAB
ABSOLUTE EOSINOPHIL (OHS): 0.02 K/UL
ABSOLUTE MONOCYTE (OHS): 0.46 K/UL (ref 0.3–1)
ABSOLUTE NEUTROPHIL COUNT (OHS): 2.12 K/UL (ref 1.8–7.7)
ALBUMIN SERPL BCP-MCNC: 3.2 G/DL (ref 3.5–5.2)
ALP SERPL-CCNC: 98 UNIT/L (ref 40–150)
ALT SERPL W/O P-5'-P-CCNC: 123 UNIT/L (ref 10–44)
ANION GAP (OHS): 7 MMOL/L (ref 8–16)
AST SERPL-CCNC: 65 UNIT/L (ref 11–45)
BASOPHILS # BLD AUTO: 0.02 K/UL
BASOPHILS NFR BLD AUTO: 0.5 %
BILIRUB SERPL-MCNC: 0.3 MG/DL (ref 0.1–1)
BUN SERPL-MCNC: 9 MG/DL (ref 8–23)
CALCIUM SERPL-MCNC: 10 MG/DL (ref 8.7–10.5)
CHLORIDE SERPL-SCNC: 106 MMOL/L (ref 95–110)
CO2 SERPL-SCNC: 28 MMOL/L (ref 23–29)
CREAT SERPL-MCNC: 0.6 MG/DL (ref 0.5–1.4)
ERYTHROCYTE [DISTWIDTH] IN BLOOD BY AUTOMATED COUNT: 14.7 % (ref 11.5–14.5)
GFR SERPLBLD CREATININE-BSD FMLA CKD-EPI: >60 ML/MIN/1.73/M2
GLUCOSE SERPL-MCNC: 82 MG/DL (ref 70–110)
HCT VFR BLD AUTO: 38.9 % (ref 37–48.5)
HGB BLD-MCNC: 13.1 GM/DL (ref 12–16)
IMM GRANULOCYTES # BLD AUTO: 0.01 K/UL (ref 0–0.04)
IMM GRANULOCYTES NFR BLD AUTO: 0.3 % (ref 0–0.5)
LYMPHOCYTES # BLD AUTO: 1.37 K/UL (ref 1–4.8)
MCH RBC QN AUTO: 25.4 PG (ref 27–31)
MCHC RBC AUTO-ENTMCNC: 33.7 G/DL (ref 32–36)
MCV RBC AUTO: 75 FL (ref 82–98)
NUCLEATED RBC (/100WBC) (OHS): 0 /100 WBC
PLATELET # BLD AUTO: 229 K/UL (ref 150–450)
PMV BLD AUTO: 8.5 FL (ref 9.2–12.9)
POTASSIUM SERPL-SCNC: 4.3 MMOL/L (ref 3.5–5.1)
PROT SERPL-MCNC: 7.9 GM/DL (ref 6–8.4)
RBC # BLD AUTO: 5.16 M/UL (ref 4–5.4)
RELATIVE EOSINOPHIL (OHS): 0.5 %
RELATIVE LYMPHOCYTE (OHS): 34.3 % (ref 18–48)
RELATIVE MONOCYTE (OHS): 11.5 % (ref 4–15)
RELATIVE NEUTROPHIL (OHS): 52.9 % (ref 38–73)
SODIUM SERPL-SCNC: 141 MMOL/L (ref 136–145)
TSH SERPL-ACNC: 3.97 UIU/ML (ref 0.4–4)
WBC # BLD AUTO: 4 K/UL (ref 3.9–12.7)

## 2025-08-11 PROCEDURE — 1159F MED LIST DOCD IN RCRD: CPT | Mod: CPTII,95,, | Performed by: INTERNAL MEDICINE

## 2025-08-11 PROCEDURE — 84443 ASSAY THYROID STIM HORMONE: CPT

## 2025-08-11 PROCEDURE — 98006 SYNCH AUDIO-VIDEO EST MOD 30: CPT | Mod: 25,95,, | Performed by: INTERNAL MEDICINE

## 2025-08-11 PROCEDURE — 96413 CHEMO IV INFUSION 1 HR: CPT

## 2025-08-11 PROCEDURE — 85025 COMPLETE CBC W/AUTO DIFF WBC: CPT

## 2025-08-11 PROCEDURE — 25000003 PHARM REV CODE 250: Performed by: INTERNAL MEDICINE

## 2025-08-11 PROCEDURE — 80053 COMPREHEN METABOLIC PANEL: CPT

## 2025-08-11 PROCEDURE — 1160F RVW MEDS BY RX/DR IN RCRD: CPT | Mod: CPTII,95,, | Performed by: INTERNAL MEDICINE

## 2025-08-11 PROCEDURE — 36415 COLL VENOUS BLD VENIPUNCTURE: CPT

## 2025-08-11 PROCEDURE — 63600175 PHARM REV CODE 636 W HCPCS: Mod: JZ,TB | Performed by: INTERNAL MEDICINE

## 2025-08-11 RX ORDER — SODIUM CHLORIDE 0.9 % (FLUSH) 0.9 %
10 SYRINGE (ML) INJECTION
Status: CANCELLED | OUTPATIENT
Start: 2025-08-11

## 2025-08-11 RX ORDER — HEPARIN 100 UNIT/ML
500 SYRINGE INTRAVENOUS
Status: CANCELLED | OUTPATIENT
Start: 2025-08-11

## 2025-08-11 RX ORDER — DIPHENHYDRAMINE HYDROCHLORIDE 50 MG/ML
50 INJECTION, SOLUTION INTRAMUSCULAR; INTRAVENOUS ONCE AS NEEDED
Status: CANCELLED | OUTPATIENT
Start: 2025-08-11

## 2025-08-11 RX ORDER — EPINEPHRINE 0.3 MG/.3ML
0.3 INJECTION SUBCUTANEOUS ONCE AS NEEDED
Status: CANCELLED | OUTPATIENT
Start: 2025-08-11

## 2025-08-11 RX ADMIN — SODIUM CHLORIDE 1500 MG: 9 INJECTION, SOLUTION INTRAVENOUS at 09:08

## 2025-08-16 ENCOUNTER — PATIENT MESSAGE (OUTPATIENT)
Dept: ADMINISTRATIVE | Facility: OTHER | Age: 75
End: 2025-08-16
Payer: MEDICARE

## 2025-08-17 ENCOUNTER — PATIENT MESSAGE (OUTPATIENT)
Dept: ADMINISTRATIVE | Facility: OTHER | Age: 75
End: 2025-08-17
Payer: MEDICARE

## 2025-08-18 ENCOUNTER — PATIENT MESSAGE (OUTPATIENT)
Dept: HEMATOLOGY/ONCOLOGY | Facility: CLINIC | Age: 75
End: 2025-08-18
Payer: MEDICARE

## 2025-08-18 DIAGNOSIS — R05.2 SUBACUTE COUGH: ICD-10-CM

## 2025-08-18 RX ORDER — CODEINE PHOSPHATE AND GUAIFENESIN 10; 100 MG/5ML; MG/5ML
5 SOLUTION ORAL 3 TIMES DAILY PRN
Qty: 473 ML | Refills: 0 | Status: SHIPPED | OUTPATIENT
Start: 2025-08-18

## 2025-08-19 ENCOUNTER — PATIENT MESSAGE (OUTPATIENT)
Dept: ADMINISTRATIVE | Facility: OTHER | Age: 75
End: 2025-08-19
Payer: MEDICARE

## 2025-08-20 ENCOUNTER — TELEPHONE (OUTPATIENT)
Dept: HEMATOLOGY/ONCOLOGY | Facility: CLINIC | Age: 75
End: 2025-08-20
Payer: MEDICARE

## 2025-08-21 ENCOUNTER — PATIENT MESSAGE (OUTPATIENT)
Dept: HEMATOLOGY/ONCOLOGY | Facility: CLINIC | Age: 75
End: 2025-08-21
Payer: MEDICARE

## 2025-08-21 DIAGNOSIS — D84.821 IMMUNODEFICIENCY DUE TO CHEMOTHERAPY: ICD-10-CM

## 2025-08-21 DIAGNOSIS — C34.32 MALIGNANT NEOPLASM OF LOWER LOBE OF LEFT LUNG: Primary | ICD-10-CM

## 2025-08-21 DIAGNOSIS — Z79.69 IMMUNODEFICIENCY DUE TO CHEMOTHERAPY: ICD-10-CM

## 2025-08-21 DIAGNOSIS — T45.1X5A IMMUNODEFICIENCY DUE TO CHEMOTHERAPY: ICD-10-CM

## 2025-08-21 RX ORDER — DIPHENOXYLATE HYDROCHLORIDE AND ATROPINE SULFATE 2.5; .025 MG/1; MG/1
1 TABLET ORAL 4 TIMES DAILY PRN
Qty: 30 TABLET | Refills: 1 | Status: SHIPPED | OUTPATIENT
Start: 2025-08-21 | End: 2026-08-21

## 2025-08-22 ENCOUNTER — TELEPHONE (OUTPATIENT)
Dept: UROLOGY | Facility: CLINIC | Age: 75
End: 2025-08-22
Payer: MEDICARE

## 2025-08-24 ENCOUNTER — PATIENT MESSAGE (OUTPATIENT)
Dept: HEMATOLOGY/ONCOLOGY | Facility: CLINIC | Age: 75
End: 2025-08-24
Payer: MEDICARE

## 2025-08-28 ENCOUNTER — TELEPHONE (OUTPATIENT)
Dept: UROLOGY | Facility: CLINIC | Age: 75
End: 2025-08-28

## (undated) DEVICE — HEMOSTAT SURGICEL 4X8IN

## (undated) DEVICE — BANDAGE ELASTIC 3X5 VELCRO ST

## (undated) DEVICE — APPLICATOR CHLORAPREP ORN 26ML

## (undated) DEVICE — SEE MEDLINE ITEM 157131

## (undated) DEVICE — UNDERGLOVES BIOGEL PI SIZE 7.5

## (undated) DEVICE — SET PNEUMOCLEAR HEAT HUM SE HF

## (undated) DEVICE — PACK BASIC SETUP SC BR

## (undated) DEVICE — SUT MONOCRYL 4-0 PS-1 UND

## (undated) DEVICE — TOURNIQUET 2 PORT RED 18X4IN

## (undated) DEVICE — SOL NACL IRR 1000ML BTL

## (undated) DEVICE — BLADE SURG #15 CARBON STEEL

## (undated) DEVICE — SYR 10CC LUER LOCK

## (undated) DEVICE — CONTAINER SPECIMEN STRL 4OZ

## (undated) DEVICE — GAUZE SPONGE 4X4 12PLY

## (undated) DEVICE — DRAPE PLASTIC U 60X72

## (undated) DEVICE — DRESSING N ADH OIL EMUL 3X3

## (undated) DEVICE — DRAPE MOBILE C-ARM

## (undated) DEVICE — DECANTER VIAL ASEPTIC TRANSFER

## (undated) DEVICE — SEE MEDLINE ITEM 152522

## (undated) DEVICE — ELECTRODE REM PLYHSV RETURN 9

## (undated) DEVICE — ALCOHOL 70% ISOP RUBBING 4OZ

## (undated) DEVICE — BANDAGE ESMARK ELASTIC ST 4X9

## (undated) DEVICE — GLOVE 8 PROTEXIS PI BLUE

## (undated) DEVICE — SEE MEDLINE ITEM 157173

## (undated) DEVICE — SUT VICRYL 2-0 36 CT-1

## (undated) DEVICE — CANNULA SEAL 12MM

## (undated) DEVICE — SEE MEDLINE ITEM 157216

## (undated) DEVICE — STAPLER ENDOWRIST 45 GREEN XI

## (undated) DEVICE — POSITIONER HEAD DONUT 9IN FOAM

## (undated) DEVICE — SUT ETHILON 3-0 PS2 18 BLK

## (undated) DEVICE — GOWN SURG 2XL DISP TIE BACK

## (undated) DEVICE — DRAPE INCISE IOBAN 2 23X33IN

## (undated) DEVICE — PAD CAST SPECIALIST STRL 4

## (undated) DEVICE — HEADREST ROUND DISP FOAM 9IN

## (undated) DEVICE — SYR LUER LOCK STERILE 10ML

## (undated) DEVICE — SEE MEDLINE ITEM 157027

## (undated) DEVICE — CLOSURE SKIN STERI STRIP 1/2X4

## (undated) DEVICE — SOL 9P NACL IRR PIC IL

## (undated) DEVICE — DRESSING SPONGE 16PLY 4X4 NS

## (undated) DEVICE — SEE MEDLINE ITEM 146308

## (undated) DEVICE — COVER LIGHT HANDLE 80/CA

## (undated) DEVICE — DRAPE ARM DAVINCI XI

## (undated) DEVICE — SET TRI-LUMEN FILTERED TUBE

## (undated) DEVICE — DRAPE THREE-QTR REINF 53X77IN

## (undated) DEVICE — SEAL UNIVERSAL 5MM-8MM XI

## (undated) DEVICE — SUT VICRYL PLUS 4-0 P3 18IN

## (undated) DEVICE — GLOVE SURG PLYSPHRN ORTH SZ7.5

## (undated) DEVICE — CONNECTOR TUBING STR 5 IN 1

## (undated) DEVICE — UNDERGLOVES BIOGEL PI SIZE 8.5

## (undated) DEVICE — SEE MEDLINE ITEM 157117

## (undated) DEVICE — CATH THORACIC 24FR ST

## (undated) DEVICE — PAD ABD 8X10 STERILE

## (undated) DEVICE — DRESSING XEROFORM NONADH 1X8IN

## (undated) DEVICE — BIT DRILL 2.0MM CMP FT CALIB

## (undated) DEVICE — SUT VICRYL BR 1 GEN 27 CT-1

## (undated) DEVICE — SUTURE STRATAFIX PGAPCL 2 UNI

## (undated) DEVICE — GLOVE PROTEXIS HYDROGEL SZ7.5

## (undated) DEVICE — TOURNIQUET SB QC DP 18X4IN

## (undated) DEVICE — PAD UNDERPAD 30X30

## (undated) DEVICE — GLOVE SURGICAL LATEX SZ 7

## (undated) DEVICE — SPONGE DERMACEA GAUZE 4X4

## (undated) DEVICE — KIT ANTIFOG

## (undated) DEVICE — IRRIGATOR ENDOSCOPY DISP.

## (undated) DEVICE — SOL IRR NACL .9% 3000ML

## (undated) DEVICE — BLADE MEDIUM LONG 9MM X 31MM

## (undated) DEVICE — GOWN POLY REINF BRTH SLV XL

## (undated) DEVICE — STAPLER ENDOWRIST 45 BLUE XI

## (undated) DEVICE — SUT VICRYL 2-0 CT-2 VCP269H

## (undated) DEVICE — ADHESIVE DERMABOND ADVANCED

## (undated) DEVICE — RELOAD SUREFORM 45 4.3 GRN 6R

## (undated) DEVICE — SEALER VESSEL DAVINCI XI

## (undated) DEVICE — DRESSING MEPORE ISLAND 31/2X4

## (undated) DEVICE — SEE MEDLINE ITEM 146231

## (undated) DEVICE — GLOVE BIOGEL SZ 8 1/2

## (undated) DEVICE — TOWEL OR DISP STRL BLUE 4/PK

## (undated) DEVICE — SUT VICRYL PLUS 3-0 PS2 18

## (undated) DEVICE — SPONGE COTTON TRAY 4X4IN

## (undated) DEVICE — SUT 4-0 ETHILON 18 PS-2

## (undated) DEVICE — SOL NS 1000CC

## (undated) DEVICE — SUPPORT ULNA NERVE PROTECTOR

## (undated) DEVICE — CAUTERY TIP POLISHER

## (undated) DEVICE — BANDAGE SOFFORM STER 2IN

## (undated) DEVICE — NDL SAFETY 25G X 1.5 ECLIPSE

## (undated) DEVICE — LINER GLOVE POWDERFREE 8

## (undated) DEVICE — SEE MEDLINE ITEM 146298

## (undated) DEVICE — SCRUB HIBICLENS 4% CHG 4OZ

## (undated) DEVICE — DRAPE EMERALD 87X114.75X113

## (undated) DEVICE — SOL CLEARIFY VISUALIZATION LAP

## (undated) DEVICE — SEE MEDLINE ITEM 152523

## (undated) DEVICE — SCRUB DYNA-HEX LIQ 4% CHG 4OZ

## (undated) DEVICE — SEE MEDLINE ITEM 152622

## (undated) DEVICE — SYR 3CC LUER LOC

## (undated) DEVICE — TUBING FLOSTEADY ARTHROSCOPY

## (undated) DEVICE — COVER OVERHEAD SURG LT BLUE

## (undated) DEVICE — STAPLER SUREFORM CRVD TIP 45

## (undated) DEVICE — BIT DRILL 2.2MM CMP FT CALIB

## (undated) DEVICE — NDL ECLIPSE SAFETY 18GX1-1/2IN

## (undated) DEVICE — TUBE SMOKE EVAC DUAL LUMEN

## (undated) DEVICE — SOL ELECTROLUBE ANTI-STIC

## (undated) DEVICE — GLOVE SURG BIOGEL LATEX SZ 7.5

## (undated) DEVICE — SEE MEDLINE ITEM 152528

## (undated) DEVICE — TROCAR ENDOPATH XCEL 5X100MM

## (undated) DEVICE — DRAPE SURG W/TWL 17 5/8X23

## (undated) DEVICE — MANIFOLD 4 PORT

## (undated) DEVICE — DRESSING XEROFORM FOIL PK 1X8

## (undated) DEVICE — KWIRE FIXATION NTHRD 3.0MM
Type: IMPLANTABLE DEVICE | Site: FOOT | Status: NON-FUNCTIONAL
Removed: 2019-06-28

## (undated) DEVICE — PORT ACCESS 8MM W/120MM LOW

## (undated) DEVICE — COVER CAMERA OPERATING ROOM

## (undated) DEVICE — DRAPE HAND STERILE

## (undated) DEVICE — CANNULA REDUCER 12-8MM

## (undated) DEVICE — GUIDEWIRE .86MM TROCAR TIP
Type: IMPLANTABLE DEVICE | Site: FOOT | Status: NON-FUNCTIONAL
Removed: 2019-05-10

## (undated) DEVICE — SUT ETHILON 3/0 18IN PS-1

## (undated) DEVICE — SOL NORMAL USPCA 0.9%

## (undated) DEVICE — Device

## (undated) DEVICE — DRAPE LAPSCP CHOLE 122X102X78

## (undated) DEVICE — PAD CURAD NONADH W/ TAB 3X4IN

## (undated) DEVICE — CATH MULTLMN BLU FLEXTIP 7F 30

## (undated) DEVICE — SUT X425H ETHIBOND 1-0

## (undated) DEVICE — CATH URETHRAL RED RUBBER 12FR

## (undated) DEVICE — SUT PERMA HAND SILK BLK 0-0

## (undated) DEVICE — SHEATH ENDOWRIST 45MM

## (undated) DEVICE — SYR ONLY LUER LOCK 20CC

## (undated) DEVICE — COVER VAC PAC POSITIONER SZ31

## (undated) DEVICE — DRAPE ABDOMINAL TIBURON 14X11

## (undated) DEVICE — CONNECTOR Y 3/8X3/8X1/2 STRL

## (undated) DEVICE — GLOVE SURGICAL LATEX SZ 6

## (undated) DEVICE — SHAVER RESECTOR 4.0

## (undated) DEVICE — OBTURATOR BLADELESS 8MM XI

## (undated) DEVICE — SCISSOR TIPS METAENBAUM LAP

## (undated) DEVICE — KITTNER ENDOSCOPIC SINGLE TIP

## (undated) DEVICE — SCRUB 10% POVIDONE IODINE 4OZ

## (undated) DEVICE — DRAPE STERI LONG

## (undated) DEVICE — ADHESIVE MASTISOL VIAL 48/BX

## (undated) DEVICE — PAD ADULT ELECTRODE GROUNDING

## (undated) DEVICE — BOOT WALKER ROCKER MEDIUM

## (undated) DEVICE — NDL PNEUMO INSUFFLATI 120MM

## (undated) DEVICE — SEE MEDLINE ITEM 146420

## (undated) DEVICE — GOWN NONREINF SET-IN SLV 2XL

## (undated) DEVICE — PAD ABDOMINAL STERILE 8X10IN

## (undated) DEVICE — SEE MEDLINE ITEM 154981

## (undated) DEVICE — GLOVE SURGICAL LATEX SZ 8

## (undated) DEVICE — DRESSING TELFA N ADH 3X8

## (undated) DEVICE — NDL ECLIPSE SAF REG 25GX1.5IN

## (undated) DEVICE — NDL SPINAL 18GX3.5 SPINOCAN

## (undated) DEVICE — RELOAD SUREFORM 45 3.5 BLU 6R

## (undated) DEVICE — CORD BIPOLAR ELECTROSURGICAL

## (undated) DEVICE — DRAPE COLUMN DAVINCI XI

## (undated) DEVICE — PAD CAST SPECIALIST STRL 6

## (undated) DEVICE — SEE MEDLINE ITEM 152529

## (undated) DEVICE — GUIDEWIRE TROCAR TIP .045
Type: IMPLANTABLE DEVICE | Site: FOOT | Status: NON-FUNCTIONAL
Removed: 2019-05-10

## (undated) DEVICE — DRESSING XEROFORM 1X8IN

## (undated) DEVICE — PAD CAST SPECIALIST STRL 3

## (undated) DEVICE — DRAPE U SPLIT SHEET 54X76IN

## (undated) DEVICE — CONTAINER SPECIMEN OR STER 4OZ

## (undated) DEVICE — DRAPE INCISE IOBAN 2 23X17IN

## (undated) DEVICE — NDL HYPO SAFETY 22 X 1 1/2

## (undated) DEVICE — SUT VICRYL PLUS 0 CT1 18IN

## (undated) DEVICE — COVER TIP CURVED SCISSORS XI

## (undated) DEVICE — PAD RADIOLUCENT STAT ADULT

## (undated) DEVICE — BLADE LONG 31.0MM X 9.0MM

## (undated) DEVICE — ALCOHOL 70% ANTISEPTIC ISO 4OZ

## (undated) DEVICE — SUT VICRYL 4-0 18 P-3

## (undated) DEVICE — SUT PROLENE 3-0 PS-2 BL 18IN

## (undated) DEVICE — SUT ETHILON 4-0 PS2 18 BLK

## (undated) DEVICE — SUT 1 18IN COATED VICRYL UN

## (undated) DEVICE — GLOVE SURGICAL LATEX SZ 6.5

## (undated) DEVICE — KIT ANTIFOG W/SPONG & FLUID

## (undated) DEVICE — OBTURATOR BLADELESS 8MM XI CLR

## (undated) DEVICE — BAG INZII TISS RETRV 12/15MM

## (undated) DEVICE — TISSUE RETRIEVAL SYS SZ19015MM

## (undated) DEVICE — SEE MEDLINE ITEM 152680

## (undated) DEVICE — TUBING HEATED INSUFFLATOR

## (undated) DEVICE — SEE MEDLINE ITEM 146292

## (undated) DEVICE — DRESSING CHG FOAM 4MM 1IN